# Patient Record
Sex: MALE | Race: WHITE | NOT HISPANIC OR LATINO | Employment: OTHER | URBAN - METROPOLITAN AREA
[De-identification: names, ages, dates, MRNs, and addresses within clinical notes are randomized per-mention and may not be internally consistent; named-entity substitution may affect disease eponyms.]

---

## 2017-01-04 ENCOUNTER — ALLSCRIPTS OFFICE VISIT (OUTPATIENT)
Dept: OTHER | Facility: OTHER | Age: 72
End: 2017-01-04

## 2017-01-10 ENCOUNTER — HOSPITAL ENCOUNTER (OUTPATIENT)
Dept: RADIOLOGY | Facility: MEDICAL CENTER | Age: 72
Discharge: HOME | End: 2017-01-10
Payer: MEDICARE

## 2017-01-10 DIAGNOSIS — I70.299 OTHER ATHEROSCLEROSIS OF NATIVE ARTERIES OF EXTREMITIES, UNSPECIFIED EXTREMITY (HCC): ICD-10-CM

## 2017-01-10 DIAGNOSIS — I70.0 ATHEROSCLEROSIS OF AORTA (HCC): ICD-10-CM

## 2017-01-10 PROCEDURE — 93925 LOWER EXTREMITY STUDY: CPT

## 2017-01-10 PROCEDURE — 93923 UPR/LXTR ART STDY 3+ LVLS: CPT

## 2017-01-11 ENCOUNTER — ALLSCRIPTS OFFICE VISIT (OUTPATIENT)
Dept: OTHER | Facility: OTHER | Age: 72
End: 2017-01-11

## 2017-04-11 DIAGNOSIS — I70.209 ATHEROSCLEROSIS OF NATIVE ARTERY OF EXTREMITY (HCC): ICD-10-CM

## 2017-04-13 ENCOUNTER — HOSPITAL ENCOUNTER (OUTPATIENT)
Dept: RADIOLOGY | Facility: MEDICAL CENTER | Age: 72
Discharge: HOME | End: 2017-04-13
Payer: MEDICARE

## 2017-04-13 DIAGNOSIS — I70.209 ATHEROSCLEROSIS OF NATIVE ARTERY OF EXTREMITY (HCC): ICD-10-CM

## 2017-04-13 PROCEDURE — 93923 UPR/LXTR ART STDY 3+ LVLS: CPT

## 2017-04-13 PROCEDURE — 93925 LOWER EXTREMITY STUDY: CPT

## 2017-06-08 ENCOUNTER — ALLSCRIPTS OFFICE VISIT (OUTPATIENT)
Dept: OTHER | Facility: OTHER | Age: 72
End: 2017-06-08

## 2017-07-11 DIAGNOSIS — I70.209 ATHEROSCLEROSIS OF NATIVE ARTERY OF EXTREMITY (HCC): ICD-10-CM

## 2017-07-11 DIAGNOSIS — I10 ESSENTIAL (PRIMARY) HYPERTENSION: ICD-10-CM

## 2017-07-14 ENCOUNTER — HOSPITAL ENCOUNTER (OUTPATIENT)
Dept: RADIOLOGY | Facility: MEDICAL CENTER | Age: 72
Discharge: HOME | End: 2017-07-14
Payer: MEDICARE

## 2017-07-14 ENCOUNTER — GENERIC CONVERSION - ENCOUNTER (OUTPATIENT)
Dept: OTHER | Facility: OTHER | Age: 72
End: 2017-07-14

## 2017-07-14 DIAGNOSIS — I70.209 ATHEROSCLEROSIS OF NATIVE ARTERY OF EXTREMITY (HCC): ICD-10-CM

## 2017-07-14 PROCEDURE — 93925 LOWER EXTREMITY STUDY: CPT

## 2017-07-14 PROCEDURE — 93923 UPR/LXTR ART STDY 3+ LVLS: CPT

## 2017-07-17 ENCOUNTER — ALLSCRIPTS OFFICE VISIT (OUTPATIENT)
Dept: OTHER | Facility: OTHER | Age: 72
End: 2017-07-17

## 2017-08-10 ENCOUNTER — HOSPITAL ENCOUNTER (OUTPATIENT)
Dept: RADIOLOGY | Facility: MEDICAL CENTER | Age: 72
Discharge: HOME | End: 2017-08-10
Payer: MEDICARE

## 2017-08-10 ENCOUNTER — HOSPITAL ENCOUNTER (OUTPATIENT)
Dept: NON INVASIVE DIAGNOSTICS | Facility: MEDICAL CENTER | Age: 72
Discharge: HOME | End: 2017-08-10
Payer: MEDICARE

## 2017-08-10 DIAGNOSIS — I10 ESSENTIAL (PRIMARY) HYPERTENSION: ICD-10-CM

## 2017-08-10 PROCEDURE — 93880 EXTRACRANIAL BILAT STUDY: CPT

## 2017-08-10 PROCEDURE — 93306 TTE W/DOPPLER COMPLETE: CPT

## 2017-08-17 DIAGNOSIS — I65.29 OCCLUSION AND STENOSIS OF UNSPECIFIED CAROTID ARTERY: ICD-10-CM

## 2017-08-21 ENCOUNTER — APPOINTMENT (OUTPATIENT)
Dept: LAB | Facility: MEDICAL CENTER | Age: 72
End: 2017-08-21
Payer: MEDICARE

## 2017-08-21 DIAGNOSIS — I65.29 OCCLUSION AND STENOSIS OF UNSPECIFIED CAROTID ARTERY: ICD-10-CM

## 2017-08-21 LAB
BUN SERPL-MCNC: 17 MG/DL (ref 5–25)
CREAT SERPL-MCNC: 1.15 MG/DL (ref 0.6–1.3)
GFR SERPL CREATININE-BSD FRML MDRD: 62 ML/MIN/1.73SQ M

## 2017-08-21 PROCEDURE — 36415 COLL VENOUS BLD VENIPUNCTURE: CPT

## 2017-08-21 PROCEDURE — 82565 ASSAY OF CREATININE: CPT

## 2017-08-21 PROCEDURE — 84520 ASSAY OF UREA NITROGEN: CPT

## 2017-08-26 ENCOUNTER — HOSPITAL ENCOUNTER (OUTPATIENT)
Dept: CT IMAGING | Facility: HOSPITAL | Age: 72
Discharge: HOME | End: 2017-08-26
Payer: MEDICARE

## 2017-08-26 DIAGNOSIS — I65.29 OCCLUSION AND STENOSIS OF UNSPECIFIED CAROTID ARTERY: ICD-10-CM

## 2017-08-26 PROCEDURE — 70498 CT ANGIOGRAPHY NECK: CPT

## 2017-08-26 RX ADMIN — IOHEXOL 85 ML: 350 INJECTION, SOLUTION INTRAVENOUS at 09:42

## 2017-09-12 ENCOUNTER — ALLSCRIPTS OFFICE VISIT (OUTPATIENT)
Dept: OTHER | Facility: OTHER | Age: 72
End: 2017-09-12

## 2017-10-26 NOTE — PROGRESS NOTES
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)  3  Carotid artery stenosis (433 10) (I65 29)  4  Current every day smoker (305 1) (F17 200)  5  Hyperlipidemia (272 4) (E78 5)1      1 Amended By: Huy Champion; Sep 12 2017 5:03 PM EST    Plan  Aortic stenosis, CAD (coronary artery disease), SocHx: Current every day smoker    · VAS CAROTID COMPLETE STUDY; SIDE:Bilateral; Status:Active; Requested  for:[id number]; Perform:[facility]; IGD:[id number]; Last Updated By:Rene Blanca;   9/12/2017 3:52:07 PM;Ordered; For:Aortic stenosis, CAD (coronary artery disease),   SocHx: Current every day smoker; Ordered By:Eagle Higuera;  Carotid artery stenosis    · Renew: Atorvastatin Calcium 80 MG Oral Tablet; TAKE 1 TABLET AT BEDTIME  Rx By: Huy Champion; Dispense: 90 Days ; #:90 Tablet; Refill: 2;For: Carotid artery   stenosis; TONY = N; Verified Transmission to [facility]; Last   Updated By: System, [employer]; 9/12/2017 1:42:54 PM  SocHx: Current every day smoker    · Call 911 if: You have any symptoms of a stroke ; Status:Complete;   Done: [id number]  Ordered; For:SocHx: Current every day smoker; Ordered By:Eagle Higuera;   · VAS LOWER LIMB ARTERIAL DUPLEX, COMPLETE BILATERAL/GRAFTS; SIDE:Bilateral;  Status:Active; Requested for:18Qdj0233; Perform:[facility]; OZM:[id number]; Last Updated By:Rene Blanca;   9/12/2017 3:52:07 PM;Ordered; For:SocHx: Current every day smoker; Ordered By:Eagle Higuera;    Discussion/Summary  Discussion Summary:   #1Asymptomatic 1  high-grade bilateral carotid artery stenosis? I reviewed the CT scan from 2014 and the one done recently, there is minimal change between the 2 CAT scans  However there is a progression of the velocities on the carotid Dopplers  I will discuss with this case with Dr Lyndsey Smith as well since he has known him for a long time  I went over with him the risks and benefits of treatment options which include best medical management, carotid endarterectomy versus carotid stent  Risks of the procedure the chest stroke were discussed with the patient  I also recommended that we should increase the dose of statin to 80 mg per day which she agrees to do  I also recommended Plavix but the patient does not want to take it at this point he will continue with his aspirin  The symptoms of stroke were explained to patient if that happens he should go to the nearest emergency room with 911  #2Atherosclerosis 1  of the bypass graft of the left lower extremity?there is stenosis of the distal anastomosis the bypass graft  It has not changed over the last 6 months with a preserved SAMEER and no symptoms of a continue to watch it with repeat duplex in 6 months  hyperlipidemia?increase statin dose to 80 mg a day  6 at smoking with the continuous disorder?smoking cessation strictly enforced, counseling done today  1        1 Amended By: Rafi Tesfaye; Sep 12 2017 5:02 PM EST    Chief Complaint  Chief Complaint Free Text Note Form:  I am here for my test results  History of Present Illness  HPI: Pt is here to review CTA of neck 8/26/2017  He denies slurred speech, facial weakness  Denies hx of stroke  He offers no complaints  Review of Systems  Complete Male - Vasc:   Constitutional: No fever or chills, feels well, no tiredness, no recent weight gain or weight loss  Eyes: No sudden vision loss, no blurred vision, no double vision  ENT: no loss of hearing, no nosebleeds, no hoarseness  Cardiovascular: intermittent leg claudication,-- no painful veins,-- no leg pain with walking-- and-- no bleeding veins, but-- regular heart rate,-- no chest pain-- and-- no palpitations  Respiratory: no wheezing,-- no cough,-- no shortness of breath during exertion,-- no orthopnea-- and-- no complaints of PND, but-- shortness of breath     Gastrointestinal: diarrhea, but-- no nausea,-- no vomiting,-- no constipation-- and-- no blood in stools  no hemorrhoids   Genitourinary: no dysuria, no hematuria, No urinary incontinence, no erectile dysfunction  Musculoskeletal: no limb pain, no limb swelling  Integumentary: dry skin-- and-- no ulcers, but-- no rashes,-- no itching,-- no skin lesions-- and-- no skin wound  Neurological: headache,-- numbness-- and-- no dementia, but-- no confusion,-- no dizziness,-- no limb weakness,-- no convulsions,-- no fainting-- and-- no difficulty walking  Psychiatric: no depression, no mood disorders, no anxiety  Hematologic/Lymphatic: no bleeding disorder, no easy bruising  ROS Reviewed:   ROS reviewed  Active Problems  1  Aortic stenosis (424 1) (I35 0)  2  Aorto-iliac atherosclerosis (440 0,440 20) (I70 0,I70 299)  3  Atherosclerosis of arteries of extremities (440 20) (I70 209)  4  History of Atherosclerosis of left lower extremity with rest pain (440 22) (I70 222)  5  Atherosclerosis of lower extremity with claudication (440 21) (I70 219)  6  Bilateral lower extremity edema (782 3) (R60 0)  7  Bulging lumbar disc (722 10) (M51 26)  8  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)  9  CAD (coronary artery disease) (414 00) (I25 10)  10  Carotid artery stenosis (433 10) (I65 29)  11  Chest pain (786 50) (R07 9)  12  Current every day smoker (305 1) (F17 200)  13  DMII (diabetes mellitus, type 2) (250 00) (E11 9)  14  Edema of left lower extremity (782 3) (R60 0)  15  Headache (784 0) (R51)  16  Hyperlipidemia (272 4) (E78 5)  17  Hypertension (401 9) (I10)  18  Left leg swelling (729 81) (M79 89)  19  Low back pain radiating to right leg (724 2) (M54 5)  20  Peripheral arterial disease (443 9) (I73 9)  21  Postoperative state (V45 89) (Z98 890)  22  Renal artery stenosis (440 1) (I70 1)  23  Right lumbar radiculopathy (724 4) (M54 16)  24  Shingles rash (053 9) (B02 9)    Past Medical History  1   History of Acute alcoholic pancreatitis (449 9) (K85 20)  2  History of Atherosclerosis of left lower extremity with rest pain (440 22) (I70 222)  3  History of Cervical spinal stenosis (723 0) (M48 02)  4  History of Dyslipidemia (272 4) (E78 5)  5  History of gastroesophageal reflux (GERD) (V12 79) (Z87 19)  Active Problems And Past Medical History Reviewed: The active problems and past medical history were reviewed and updated today  Surgical History  1  History of Appendectomy For Ruptured Appendix  2  History of Bypass Graft Using Vein: Femoral-tibial  3  CABG  4  History of Common Femoral Thromboendarterectomy  5  History of Right Hemicolectomy  6  History of Tonsillectomy With Adenoidectomy  Surgical History Reviewed: The surgical history was reviewed and updated today  Family History  Father   1  Family history of Heart Disease (V17 49)  2  Family history of Prior Myocardial Infarction  Sister   3  Family history of CABG  Uncle   4  Family history of cerebrovascular accident (V17 1) (Z82 3)  Family History   5  Family history of Heart Disease (V17 49)  6  Family history of Prior Myocardial Infarction  Family History Reviewed: The family history was reviewed and updated today  Social History   · Active smoker (305 1) (Z72 0)   · Alcohol Use (History)   · Denied: History of Being A Social Drinker   · Caffeine Use   · Current every day smoker (305 1) (F17 200)   · Denied: History of Drug Use   · Denied: History of Former Smoker   · Job Hazardous   · Retired From Work  Social History Reviewed: The social history was reviewed and updated today  Current Meds  1  AmLODIPine Besylate 10 MG Oral Tablet; TAKE 1 TABLET DAILY  Requested for:   27LNO0126; Last Rx:45Vzm9250 Ordered  2  Aspirin 81 MG TABS; TAKE 1 TABLET DAILY; Therapy: 49Uyx9096 to (Evaluate:35Nmo4541); Last Rx:56Pdg4402 Ordered  3  Atorvastatin Calcium 40 MG Oral Tablet Recorded  4  Benazepril HCl - 40 MG Oral Tablet; Therapy: (Katarzyna Frye) to Recorded  5  Coenzyme Q10 200 MG Oral Capsule; Take as directed Recorded  6  Daily Multiple Vitamins TABS; Take as directed Recorded  7  Magnesium CAPS; Take 1 cap every other day; Therapy: (Ector Oshea) to Recorded  8  MetFORMIN HCl - 500 MG Oral Tablet; TAKE 1 TABLET TWICE DAILY,  WITH MORNING   AND EVENING MEAL; Therapy: (Recorded:15Oct2015) to Recorded  9  Metoprolol Tartrate 25 MG Oral Tablet; Take 1 tablet twice daily; Therapy: 61Zqj1735 to (Evaluate:22Sep2014)  Requested for: 92FIZ3466; Last   Rx:27Sep2013 Ordered  10  Omeprazole 40 MG Oral Capsule Delayed Release; TAKE 1 CAPSULE DAILY; Therapy: (Elsy Sigala) to Recorded  11  OneTouch Delica Lancets MISC; Use once a day; Therapy: 41JGF4875 to ([phone])  Requested for: 29Oct2013; Last    ML:74YAN7248 Ordered  12  OneTouch Ultra Blue In Vitro Strip; TEST ONCE DAILY; Therapy: 73YTI4486 to ([phone])  Requested for: 29Oct2013; Last    Rx:29Oct2013 Ordered  13  OneTouch Ultra Mini w/Device Kit; USE AS DIRECTED; Therapy: 48ASP9589 to (Last JE:78WMC8589)  Requested for: 29Oct2013 Ordered  14  SM Vitamin B-12 TABS; Therapy: (Recorded:27Sep2013) to Recorded  Medication List Reviewed: The medication list was reviewed and updated today  Allergies  1  No Known Drug Allergies    Vitals  Vital Signs    Recorded: 12Sep2017 12:54PM   Heart Rate 56, R Radial   Pulse Quality Normal, R Radial   Respiration 14   Systolic 087, RUE, Sitting   Diastolic 60, RUE, Sitting   Height 5 ft 11 in   Weight 188 lb    BMI Calculated 26 22   BSA Calculated 2 05     Physical Exam    Posterior tibialis: right 0-- and-- left 0  Dorsalis pedis: right 0-- and-- left 0  Distal Pulse Exam: This patient had dopplerable pulses in these locations: right ,-- left Aphasic anterior tibial    There was a palpable graft pulse at Left femoral to anterior tibial bypass  Normal Capillary Refill  Extremities: bilateral ankle pitting edema     LE Varicose Veins: no right leg truncal veins  The heart rate was normal  The rhythm was regular  Heart sounds: normal S1-- and-- normal S2    Murmurs: systolic ejection murmur   Pulmonary   Respiratory effort: No increased work of breathing or signs of respiratory distress  Auscultation of lungs: Clear to auscultation  No wheezing, no rales, no rhonchi  Psychiatric   Orientation to person, place and time: Normal    Mood and affect: Normal    Eyes   Conjunctiva and lids: No swelling, erythema, or discharge  Pupils and irises: Equal, round and reactive to light  Ears, Nose, Mouth, and Throat   Hearing: Normal    Neck   Neck: No jugular venous distention, normal ROM and extension  No cervical adenopathy, trachea midline, neck supple  Musculoskeletal   Gait and station: Normal    Skin   Skin and subcutaneous tissue: Normal without rashes or lesions  Results/Data  Diagnostic Studies Reviewed Vasc: I personally reviewed the films/images/results in the office today  My interpretation follows  Vascular Study Review Dear duplex of the lower extremity reviewed, there is persistent stenosis of the distal anastomosis of the femoral to anterior tibial artery bypass  This is unchanged from last time  The ABIs preserved  CT Scan Review CT angiogram of the head and neck reviewed, there is bilateral high-grade carotid stenosis with calcified irregular plaque  Future Appointments    Date/Time Provider Specialty Site   11/01/2017 01:40 PM PATY Garcia  Cardiology [facility] CARDIOLOGY [place]     Signatures   Electronically signed by : Froylan Fitzgerald MD; Sep 12 2017  5:01PM EST                       (Author)    Electronically signed by :  Froylan Fitzgerald MD; Sep 12 2017  5:03PM EST                       (Author)

## 2017-11-27 ENCOUNTER — ALLSCRIPTS OFFICE VISIT (OUTPATIENT)
Dept: OTHER | Facility: OTHER | Age: 72
End: 2017-11-27

## 2018-01-09 NOTE — MISCELLANEOUS
Message  pt had called 3/10/16 w/ concerns re: doppler results from 2/23/16, he did not receive doppler letter  his LLE/foot cont to be swollen no signif improvement s/p L fem tib bypass 11/19/15  He notes his r foot also has some swelling  He notes his R great toe also has some pain in it the first thing in the am when he gets up but this resolves once he is active  No discoloration  He does have a hx of gout  he req apt w/ Dr Afshan Woods which was sched for 3/21/16  Dr Afshan Woods was emailed and notified of same on 3/16  per Dr Afshan Woods "His swelling is understandable following his surgery his multiple medical problem history and his medications (Amlodipine)  His Doppler fell thru the cracks in our Follow up workflow so a letter was not generated  Re-assure him that his study looks great and his bypass is working perfectly " pt was notified of same, he will keep f/u apt as sched for 3/21/16 to address his concerns re: cont swelling  Active Problems    1  Aortic stenosis (747 22) (Q25 3)   2  Aorto-iliac atherosclerosis (440 0,440 20) (I70 0,I70 299)   3  Atheroscler native arteries the extremities w/intermit claudication (440 21) (I70 219)   4  Atherosclerosis of arteries of extremities (440 20) (I70 209)   5  Atherosclerosis of left lower extremity with rest pain (440 22) (I70 222)   6  Atherosclerotic heart disease of native coronary artery without angina pectoris (414 01)   (I25 10)   7  Bilateral lower extremity edema (782 3) (R60 0)   8  Carotid artery stenosis (433 10) (I65 29)   9  Chest pain (786 50) (R07 9)   10  DMII (diabetes mellitus, type 2) (250 00) (E11 9)   11  Edema of left lower extremity (782 3) (R60 0)   12  Headache (784 0) (R51)   13  Hyperlipidemia (272 4) (E78 5)   14  Hypertension (401 9) (I10)   15  Left leg swelling (729 81) (M79 89)   16  Peripheral arterial disease (443 9) (I73 9)   17  Postoperative state (V45 89) (Z98 89)   18  Renal artery stenosis (440 1) (I70 1)   19   Shingles rash (053  9) (B02 9)    Current Meds   1  AmLODIPine Besylate 5 MG Oral Tablet; TAKE 1 TABLET DAILY; Last Rx:09Apr2015   Ordered   2  Aspirin 81 MG Oral Tablet; TAKE 1 TABLET DAILY; Therapy: 75Hki0307 to (Evaluate:18Jun2016); Last Rx:76Cdy5086 Ordered   3  Atorvastatin Calcium 40 MG Oral Tablet Recorded   4  Benazepril HCl - 40 MG Oral Tablet; Therapy: (Heydi Manrique) to Recorded   5  Coenzyme Q10 200 MG Oral Capsule; Take as directed Recorded   6  Daily Multiple Vitamins TABS; Take as directed Recorded   7  Magnesium CAPS; Take 1 cap every other day; Therapy: (Matthew Delgado) to Recorded   8  MetFORMIN HCl - 500 MG Oral Tablet; TAKE 1 TABLET TWICE DAILY,  WITH MORNING   AND EVENING MEAL; Therapy: (Recorded:15Oct2015) to Recorded   9  MetFORMIN HCl - 500 MG Oral Tablet; Take 1 tablet twice daily; Therapy: 83CJE0687 to Recorded   10  Metoprolol Tartrate 25 MG Oral Tablet; Take 1 tablet twice daily; Therapy: 68Ydg2360 to (Evaluate:22Sep2014)  Requested for: 21UPF7149; Last    Rx:25Wpg5766 Ordered   11  Omeprazole 40 MG Oral Capsule Delayed Release; TAKE 1 CAPSULE DAILY; Therapy: (Yaritza Bright) to Recorded   12  OneTouch Delica Lancets MISC; Use once a day; Therapy: 50MAL1366 to ([phone])  Requested for: 29Oct2013; Last    Rx:29Oct2013 Ordered   13  OneTouch Ultra Blue In Vitro Strip; TEST ONCE DAILY; Therapy: 35XTD9944 to ([phone])  Requested for: 29Oct2013; Last    OM:01YJX9231 Ordered   14  OneTouch Ultra Mini w/Device Kit; USE AS DIRECTED; Therapy: 51DOC8688 to (Last ZZ:72ASZ2416)  Requested for: 29Oct2013 Ordered   15  SM Vitamin B-12 TABS; Therapy: (Recorded:16Lsn6181) to Recorded    Allergies    1   No Known Drug Allergies    Signatures   Electronically signed by : Benjamín Simmons, ; Mar 15 2016 10:35AM EST                       (Author)

## 2018-01-11 NOTE — CONSULTS
I had the pleasure of evaluating your patient, Marie Rubio  My full evaluation follows:      Chief Complaint  Patient is here today for 3 mo f/u  Patient c/o occ Dizziness x 1 5 weeks ago  Patient is here today for follow up of chronic conditions described in HPI  History of Present Illness  Mr Tania Chen is a 74year old man with coronary artery disease s/p CABG and LLE revascularization who returns for follow up  No chest pain, shortness of breath, or palpitations  Occasionally feels dizzy when he stands up  Was prescribed plavix, but hasn't started  Review of Systems      Cardiac: No complaints of chest pain, no palpitations, no fainiting  Skin: No complaints of nonhealing sores or skin rash  Genitourinary: No complaints of recurrent urinary tract infections, frequent urination at night, difficult urination, blood in urine, kidney stones, loss of bladder control, no kidney or prostate problems, no erectile dysfunction  Psychological: No complaints of feeling depressed, anxiety, panic attacks, or difficulty concentrating  General: No complaints of trouble sleeping, lack of energy, fatigue, appetite changes, weight changes, fever, frequent infections, or night sweats  Respiratory: No complaints of shortness of breath, cough with sputum, or wheezing  HEENT: No complaints of serious problems, hearing problems, nose problems, throat problems, or snoring  Gastrointestinal: No complaints of liver problems, nausea, vomiting, heartburn, constipation, bloody stools, diarrhea, problems swallowing, adbominal pain, or rectal bleeding  Hematologic: No complaints of bleeding disorders, anemia, blood clots, or excessive brusing  Neurological: dizziness   Musculoskeletal: No complaints of arthritis, back pain, or painfull swelling  Active Problems    1  Aortic stenosis (424 1) (I35 0)   2  Aorto-iliac atherosclerosis (440 0,440 20) (I70 0,I70 299)   3   Atherosclerosis of arteries of extremities (440 20) (I70 209)   4  History of Atherosclerosis of left lower extremity with rest pain (440 22) (I70 222)   5  Atherosclerosis of lower extremity with claudication (440 21) (I70 219)   6  Bilateral lower extremity edema (782 3) (R60 0)   7  Bulging lumbar disc (722 10) (M51 26)   8  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)   9  CAD (coronary artery disease) (414 00) (I25 10)   10  Carotid artery stenosis (433 10) (I65 29)   11  Chest pain (786 50) (R07 9)   12  Current every day smoker (305 1) (F17 200)   13  DMII (diabetes mellitus, type 2) (250 00) (E11 9)   14  Edema of left lower extremity (782 3) (R60 0)   15  Headache (784 0) (R51)   16  Hyperlipidemia (272 4) (E78 5)   17  Hypertension (401 9) (I10)   18  Left leg swelling (729 81) (M79 89)   19  Low back pain radiating to right leg (724 2) (M54 5)   20  Peripheral arterial disease (443 9) (I73 9)   21  Postoperative state (V45 89) (Z98 890)   22  Renal artery stenosis (440 1) (I70 1)   23  Right lumbar radiculopathy (724 4) (M54 16)   24  Shingles rash (053 9) (B02 9)    Past Medical History    · History of Acute alcoholic pancreatitis (509 5) (K85 20)   · History of Atherosclerosis of left lower extremity with rest pain (440 22) (I70 222)   · History of Cervical spinal stenosis (723 0) (M48 02)   · History of Dyslipidemia (272 4) (E78 5)   · History of gastroesophageal reflux (GERD) (V12 79) (Z87 19)    The active problems and past medical history were reviewed and updated today  Surgical History    · History of Appendectomy For Ruptured Appendix   · History of Bypass Graft Using Vein: Femoral-tibial   · CABG   · History of Common Femoral Thromboendarterectomy   · History of Right Hemicolectomy   · History of Tonsillectomy With Adenoidectomy    The surgical history was reviewed and updated today         Family History    · Family history of Heart Disease (V17 49)   · Family history of Prior Myocardial Infarction    · Family history of CABG    · Family history of cerebrovascular accident (V17 1) (Z82 3)    · Family history of Heart Disease (V17 49)   · Family history of Prior Myocardial Infarction    The family history was reviewed and updated today  Social History    · Active smoker (305 1) (Z72 0)   · Alcohol Use (History)   · Denied: History of Being A Social Drinker   · Caffeine Use   · Current every day smoker (305 1) (F17 200)   · Denied: History of Drug Use   · Denied: History of Former Smoker   · Job Hazardous   · Retired From Work  The social history was reviewed and updated today  The social history was reviewed and is unchanged  Current Meds   1  AmLODIPine Besylate 10 MG Oral Tablet; TAKE 1 TABLET DAILY  Requested for:   64XQV1505; Last Rx:55Awb3048 Ordered   2  Aspirin 81 MG TABS; TAKE 1 TABLET DAILY; Therapy: 45Lyp7483 to (Evaluate:18Jun2016); Last Rx:89Wgq2046 Ordered   3  Atorvastatin Calcium 80 MG Oral Tablet; TAKE 1 TABLET AT BEDTIME  Requested for:   48Vqk2184; Last Rx:21Mrh6845 Ordered   4  Benazepril HCl - 40 MG Oral Tablet; Therapy: (Jaclyn Kim) to Recorded   5  Coenzyme Q10 200 MG Oral Capsule; Take as directed Recorded   6  Daily Multiple Vitamins TABS; Take as directed Recorded   7  Magnesium CAPS; Take 1 cap every other day; Therapy: [phone]) to Recorded   8  MetFORMIN HCl - 500 MG Oral Tablet; TAKE 1 TABLET TWICE DAILY,  WITH MORNING   AND EVENING MEAL; Therapy: (Recorded:15Oct2015) to Recorded   9  Metoprolol Tartrate 25 MG Oral Tablet; Take 1 tablet twice daily; Therapy: 84Jud0257 to (Evaluate:46Bsm3013)  Requested for: 82QNG7364; Last   Rx:94Mdj4724 Ordered   10  Omeprazole 40 MG Oral Capsule Delayed Release; TAKE 1 CAPSULE DAILY; Therapy: (Sussy Grant) to Recorded   11  OneTouch Delica Lancets MISC; Use once a day; Therapy: 59RMX3370 to (06 [phone])  Requested for: 29Oct2013; Last    PH:05DVG8235 Ordered   12   OneTouch Ultra Blue In [employer]; TEST ONCE DAILY; Therapy: 36VQT8187 to (21 )  Requested for: 29Oct2013; Last    Rx:29Oct2013 Ordered   13  OneTouch Ultra Mini w/Device Kit; USE AS DIRECTED; Therapy: 86UNH3019 to (Last IS:84RTR7677)  Requested for: 29Oct2013 Ordered   14  SM Vitamin B-12 TABS; Therapy: (Recorded:27Sep2013) to Recorded    The medication list was reviewed and updated today  Allergies    1  No Known Drug Allergies    Vitals   Recorded: 27Nov2017 11:29AM   Heart Rate 58, L Radial   Systolic 396, LUE, Sitting   Diastolic 60, LUE, Sitting   Height 5 ft 11 in   Weight 193 lb 4 oz   BMI Calculated 26 95   BSA Calculated 2 08     Physical Exam    Constitutional   General appearance: No acute distress, well appearing and well nourished  Eyes   Conjunctiva and Sclera examination: Conjunctiva pink, sclera anicteric  Ears, Nose, Mouth, and Throat - External inspection of ears and nose: Normal without deformities or discharge  Neck   Neck and thyroid: Normal, supple, trachea midline, no thyromegaly  Pulmonary   Respiratory effort: No increased work of breathing or signs of respiratory distress  Cardiovascular   Auscultation of heart: Abnormal   A grade 2 systolic murmur was heard at the RUSB  Carotid pulses: Abnormal   right carotid bruit heard  Examination of extremities for edema and/or varicosities: Abnormal   left pretibial 1+ pretibial pitting edema  LLE erythematous  Chest - Chest: Normal    Abdomen   Abdomen: Non-tender and no distention  Musculoskeletal Gait and station: Normal gait  Skin - Skin and subcutaneous tissue: Normal without rashes or lesions  Skin is warm and well perfused, normal turgor  Neurologic - Speech: Normal     Psychiatric - Orientation to person, place, and time: Normal  Mood and affect: Normal       Assessment    1  CAD (coronary artery disease) (414 00) (I25 10)   2  Carotid artery stenosis (433 10) (I65 29)   3   Atherosclerosis of arteries of extremities (440 20) (I70 209)   4  Aortic stenosis (424 1) (I35 0)   5  Aorto-iliac atherosclerosis (440 0,440 20) (I70 0,I70 299)   6  Hyperlipidemia (272 4) (E78 5)   7  Hypertension (401 9) (I10)    Plan  Hypertension    · Clopidogrel Bisulfate 75 MG Oral Tablet; Take 1 tablet daily   Rx By: Ten Garcia; Dispense: 90 Days ; #:90 Tablet; Refill: 3; For: Hypertension; TONY = N; Sent To: [facility]   · Follow-up visit in 6 months Evaluation and Treatment  Follow-up  Status: Hold For -  Scheduling  Requested for: 92FIF6204   Ordered; For: Hypertension; Ordered By: Ten Garcia Performed:  Due: 94WDI5686    Discussion/Summary    74year old man with coronary artery disease s/p CABG and LLE revascularization who returns for follow up  No chest pain, shortness of breath, or palpitations  Occasionally feels dizzy when he stands up  Was prescribed plavix, but hasn't started  Impression:  1  Coronary artery disease s/p CABG - doing well  2  Hypertension - good control  3  Severe bilateral carotid disease  4  Dyslipidemia - doing well  5  Peripheral arterial disease - s/p LLE revascularization  Doing well  6  Aortic stenosis - moderate by echo 10/17  Recommendations:  1  Start Clopidogrel 75mg daily  2  Continue remainder of medications  3  Patient to follow up with Vascular Surgery to evaluate for carotid artery disease  4  Fasting lipid panel checked by PCP  5  Follow up in 6 months  Thank you very much for allowing me to participate in the care of this patient  If you have any questions, please do not hesitate to contact me        Signatures   Electronically signed by : PATY Eason ; Nov 27 2017 11:46AM EST                       (Author)

## 2018-01-12 VITALS
RESPIRATION RATE: 14 BRPM | HEART RATE: 66 BPM | WEIGHT: 190 LBS | SYSTOLIC BLOOD PRESSURE: 150 MMHG | BODY MASS INDEX: 26.6 KG/M2 | HEIGHT: 71 IN | DIASTOLIC BLOOD PRESSURE: 80 MMHG

## 2018-01-12 NOTE — MISCELLANEOUS
Message  Called patient for review after reviewing results of his recent FREDO  FREDO with new finding of multiple areas of high grade graft stenosis  Patient continues to experience LLE edema  He states it is improving very slowly  Swelling is more confined to the ankle and foot, as opposed to throughout the calf as it had been for many months after surgery last Nov 2015  He says that since surgery his left foot has been numb  He had ulcers to his left foot and calf claudication prior to his bypass last year  Currently he denies any claudication symptoms  He reports he is able to work out in his yard with no difficulty  I informed him that his edema and continued numbness are not signs of new graft stenosis  Since he is asymptomatic and ABIs are near normal we can repeat FREDO in 6 months  He is anxious about the graft completely thrombosing  I will discuss this case with Dr Leah Gloria and contact patient to let him know if prophylactic arteriogram is indicated due to his new graft stenosis        Signatures   Electronically signed by : Whit Lyon; Jul 8 2016  3:38PM EST                       (Author)

## 2018-01-13 VITALS
WEIGHT: 12 LBS | BODY MASS INDEX: 1.68 KG/M2 | SYSTOLIC BLOOD PRESSURE: 170 MMHG | HEIGHT: 71 IN | HEART RATE: 56 BPM | DIASTOLIC BLOOD PRESSURE: 60 MMHG

## 2018-01-13 VITALS
HEART RATE: 57 BPM | HEIGHT: 71 IN | SYSTOLIC BLOOD PRESSURE: 148 MMHG | DIASTOLIC BLOOD PRESSURE: 64 MMHG | WEIGHT: 191 LBS | BODY MASS INDEX: 26.74 KG/M2

## 2018-01-13 VITALS
WEIGHT: 188 LBS | DIASTOLIC BLOOD PRESSURE: 60 MMHG | BODY MASS INDEX: 26.32 KG/M2 | SYSTOLIC BLOOD PRESSURE: 138 MMHG | HEIGHT: 71 IN | HEART RATE: 56 BPM | RESPIRATION RATE: 14 BRPM

## 2018-01-13 VITALS
BODY MASS INDEX: 27.06 KG/M2 | DIASTOLIC BLOOD PRESSURE: 60 MMHG | HEART RATE: 58 BPM | SYSTOLIC BLOOD PRESSURE: 120 MMHG | HEIGHT: 71 IN | WEIGHT: 193.25 LBS

## 2018-01-13 NOTE — MISCELLANEOUS
Message  Spoke with patient today about treatment of LLE bypass graft stenosis  We discussed the risk vs benefits of arteriogram  He would like to proceed with arteriogram with Dr Sylvia Holliday only  I explained to him that angioplasty of his graft stenosis would have no impact on his LLE edema  Plan  Atherosclerosis of arteries of extremities, Bypass graft stenosis    · (1) PT WITH INR; Status:Active; Requested for:63Xiv5968;   Bypass graft stenosis    · (1) BASIC METABOLIC PROFILE; Status:Active; Requested for:21Jul2016;    · (1) CBC/ PLT (NO DIFF); Status:Active;  Requested for:21Jul2016;    · Schedule Surgery Treatment  Procedure:  [id number] left lower extremity possible  angioplasty of bypass graft stenosis ([facility] only)  Status: Hold For - Scheduling   Requested for: 33Njb0934    Signatures   Electronically signed by : Christiano Grant; Jul 21 2016  4:52PM EST                       (Author)

## 2018-01-14 VITALS
HEIGHT: 71 IN | RESPIRATION RATE: 16 BRPM | SYSTOLIC BLOOD PRESSURE: 130 MMHG | HEART RATE: 60 BPM | DIASTOLIC BLOOD PRESSURE: 62 MMHG | BODY MASS INDEX: 27.61 KG/M2 | WEIGHT: 197.25 LBS

## 2018-01-15 DIAGNOSIS — I35.0 NONRHEUMATIC AORTIC VALVE STENOSIS: ICD-10-CM

## 2018-01-15 DIAGNOSIS — F17.200 NICOTINE DEPENDENCE, UNCOMPLICATED: ICD-10-CM

## 2018-01-15 DIAGNOSIS — I25.10 ATHEROSCLEROTIC HEART DISEASE OF NATIVE CORONARY ARTERY WITHOUT ANGINA PECTORIS: ICD-10-CM

## 2018-01-15 DIAGNOSIS — T82.858A STENOSIS OF OTHER VASCULAR PROSTHETIC DEVICES, IMPLANTS AND GRAFTS, INITIAL ENCOUNTER (HCC): ICD-10-CM

## 2018-01-15 DIAGNOSIS — I70.209 ATHEROSCLEROSIS OF NATIVE ARTERY OF EXTREMITY (HCC): ICD-10-CM

## 2018-01-15 NOTE — MISCELLANEOUS
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)   9  CAD (coronary artery disease) (414 00) (I25 10)   10  Carotid artery stenosis (433 10) (I65 29)   11  Chest pain (786 50) (R07 9)   12  DMII (diabetes mellitus, type 2) (250 00) (E11 9)   13  Edema of left lower extremity (782 3) (R60 0)   14  Headache (784 0) (R51)   15  Hyperlipidemia (272 4) (E78 5)   16  Hypertension (401 9) (I10)   17  Left leg swelling (729 81) (M79 89)   18  Low back pain radiating to right leg (724 2) (M54 5)   19  Peripheral arterial disease (443 9) (I73 9)   20  Postoperative state (V45 89) (Z98 89)   21  Renal artery stenosis (440 1) (I70 1)   22  Right lumbar radiculopathy (724 4) (M54 16)   23  Shingles rash (053 9) (B02 9)    Current Meds   1  AmLODIPine Besylate 5 MG Oral Tablet; TAKE 1 TABLET DAILY; Last Rx:09Apr2015   Ordered   2  Aspirin 81 MG TABS; TAKE 1 TABLET DAILY; Therapy: 40Acw0700 to (Evaluate:18Jun2016); Last Rx:21Eei8040 Ordered   3  Atorvastatin Calcium 40 MG Oral Tablet Recorded   4  Benazepril HCl - 40 MG Oral Tablet; Therapy: (Kaitlyn Fernandez to Recorded   5  Coenzyme Q10 200 MG Oral Capsule; Take as directed Recorded   6  Daily Multiple Vitamins TABS; Take as directed Recorded   7  Magnesium CAPS; Take 1 cap every other day; Therapy: [phone] 184-756) to Recorded   8   MetFORMIN HCl - 500 MG Oral Tablet; TAKE 1 TABLET TWICE DAILY,  WITH MORNING AND EVENING MEAL; Therapy: (Recorded:15Oct2015) to Recorded   9  Metoprolol Tartrate 25 MG Oral Tablet; Take 1 tablet twice daily; Therapy: 60Wsz9646 to (Evaluate:39Yas0859)  Requested for: 94ZVA2339; Last   Rx:02Tra4305 Ordered   10  Omeprazole 40 MG Oral Capsule Delayed Release; TAKE 1 CAPSULE DAILY; Therapy: (Tala Akutan) to Recorded   11  OneTouch Delica Lancets MISC; Use once a day; Therapy: 84EXH3093 to (77 [phone])  Requested for: 29Oct2013; Last    ZJ:37SDY9048 Ordered   12  OneTouch Ultra Blue In Vitro Strip; TEST ONCE DAILY; Therapy: 31XLP6540 to (77 873 135)  Requested for: 29Oct2013; Last    Rx:29Oct2013 Ordered   13  OneTouch Ultra Mini w/Device Kit; USE AS DIRECTED; Therapy: 31ARY8309 to (Last QC:38PSC2818)  Requested for: 29Oct2013 Ordered   14   Vitamin B-12 TABS; Therapy: (Recorded:02Ntk7704) to Recorded    Allergies    1  No Known Drug Allergies    Signatures   Electronically signed by :  Romeo Barrios, ; Aug 22 2016  2:17PM EST                       (Author)

## 2018-01-17 NOTE — MISCELLANEOUS
Message  Did not hear back from patient after leaving message on machine yesterday  Called again today  Spoke with his wife, who said patient was unavailable because he was gardening  Made her aware that the patient's case was discussed with Dr Louie Packer and that the patient would benefit from an arteriogram as I discussed with him in the office  I asked that patient call back the office and let us know whether or not he would like to proceed   Once we hear from him we will move forward with scheduling Agram       Signatures   Electronically signed by : Abi Rodriguez; Jul 20 2016  2:06PM EST                       (Author)

## 2018-01-17 NOTE — MISCELLANEOUS
Message  Reviewed results of recent FREDO- continues with stenosis to distal bypass graft and anastomosis  We discussed this finding at his last office visit in June 2017  He had no desire to proceed with Agram as he was asymptomatic  Short interval f/u with FREDO with no significant change  LMOM with results  Asked that he notify office if there has been any change in symptoms  Planning for f/u in 6 months        Plan  Bypass graft stenosis    · VAS LOWER LIMB ARTERIAL DUPLEX, COMPLETE BILATERAL/GRAFTS;  SIDE:Bilateral; Status:Hold For - Scheduling; Requested SVV:50MAW6390;     Signatures   Electronically signed by : Deandra Fair; Jul 19 2017  3:38PM EST                       (Author)

## 2018-01-17 NOTE — PROGRESS NOTES
Assessment    1  Postoperative state (V45 89) (Z98 89)   2  Edema of left lower extremity (782 3) (R60 0)   3  History of Bypass Graft Using Vein: Femoral-tibial    Plan     1  Keep your leg elevated whenever you can to decrease swelling and increase circulation ;   Status:Complete;   Done: 81PLU3227   2  Restrict the salt in your diet by avoiding highly salted foods ; Status:Complete;   Done:   31CKG6304    Follow-up visit in 2 weeks Evaluation and Treatment  Follow-up: wound recheck  Status: Hold For - Scheduling  Requested for: 21Jan2016  Ordered; For: PMH: Bypass Graft Using Vein: Femoral-tibial, Edema of left lower extremity, Postoperative state;  Ordered By: Shaka Kebede  Performed:   Due: 35LPT9029     Discussion/Summary    Mr Gumaro Sahni is status post left femoral to tibial bypass on 11/19/15, his postoperative edema continues to improve  He should continue to elevate 4-5 times per day for 15-20 minutes  I have given him a double layer of Tubigrip F, which he should wear daily during waking hours  He should limit his sodium intake (less than 2gm/day)  Small scab removed from right lower extremity with fibrinous exudate, slight erythema present, no malodor  If this area begins draining silver alginate should be applied every other day  I will see him in 2 weeks for recheck of this area and to check his edema  If area expands or becomes more reddened, painful or malodorous he should contact the office  Chief Complaint  " I am here for my post-op check-up "      Active Problems    1  Aortic stenosis (424 1) (I35 0)   2  Aorto-iliac atherosclerosis (440 0,440 20) (I70 0,I70 299)   3  Atheroscler native arteries the extremities w/intermit claudication (440 21) (I70 219)   4  Atherosclerosis of arteries of extremities (440 20) (I70 209)   5  Atherosclerosis of left lower extremity with rest pain (440 22) (I70 222)   6   Atherosclerotic heart disease of native coronary artery without angina pectoris (414 01)   (I25 10)   7  Bilateral lower extremity edema (782 3) (R60 0)   8  Carotid artery stenosis (433 10) (I65 29)   9  Chest pain (786 50) (R07 9)   10  DMII (diabetes mellitus, type 2) (250 00) (E11 9)   11  Headache (784 0) (R51)   12  Hyperlipidemia (272 4) (E78 5)   13  Hypertension (401 9) (I10)   14  Left leg swelling (729 81) (M79 89)   15  Peripheral arterial disease (443 9) (I73 9)   16  Postoperative state (V45 89) (Z98 89)   17  Renal artery stenosis (440 1) (I70 1)   18  Shingles rash (053 9) (B02 9)    Current Meds   1  AmLODIPine Besylate 5 MG Oral Tablet; TAKE 1 TABLET DAILY; Last Rx:09Apr2015   Ordered   2  Aspirin 81 MG Oral Tablet; TAKE 1 TABLET DAILY; Therapy: 29Bqs0439 to (Evaluate:18Jun2016); Last Rx:35Aat5147 Ordered   3  Atorvastatin Calcium 40 MG Oral Tablet Recorded   4  Benazepril HCl - 40 MG Oral Tablet; Therapy: (Leah Gordon) to Recorded   5  Coenzyme Q10 200 MG Oral Capsule; Take as directed Recorded   6  Daily Multiple Vitamins TABS; Take as directed Recorded   7  Gabapentin 300 MG Oral Capsule; TAKE 1 CAPSULE 3 TIMES DAILY; Therapy: 81GPG3835 to (Evaluate:75Dyc5399)  Requested for: 24AQL3554; Last   Rx:12Nov2015 Ordered   8  Magnesium CAPS; Take 1 cap every other day; Therapy: (75 196 772) to Recorded   9  MetFORMIN HCl - 500 MG Oral Tablet; TAKE 1 TABLET TWICE DAILY,  WITH MORNING   AND EVENING MEAL; Therapy: (Recorded:15Oct2015) to Recorded   10  MetFORMIN HCl - 500 MG Oral Tablet; Take 1 tablet twice daily; Therapy: 18TYE0293 to Recorded   11  Metoprolol Tartrate 25 MG Oral Tablet; Take 1 tablet twice daily; Therapy: 55Oua1028 to (Evaluate:11Mjh6612)  Requested for: 19XVX9948; Last    Rx:21Lbh2768 Ordered   12  Omeprazole 40 MG Oral Capsule Delayed Release; TAKE 1 CAPSULE DAILY; Therapy: (Recorded:82Wuh8438) to Recorded   13  OneTouch Delica Lancets MISC; Use once a day;     Therapy: 81YAD0334 to (65770 [phone] 32)  Requested for: 94553 61 83 15; Last    Rx:29Oct2013 Ordered   14  OneTouch Ultra Blue In Vitro Strip; TEST ONCE DAILY; Therapy: 32BKJ1065 to ()  Requested for: 29Oct2013; Last    YJ:41GSE3699 Ordered   15  OneTouch Ultra Mini w/Device Kit; USE AS DIRECTED; Therapy: 94KOG5348 to (Last FR:03YSH5185)  Requested for: 29Oct2013 Ordered   16  SM Vitamin B-12 TABS; Therapy: (Recorded:18Cbe4884) to Recorded    Allergies    1  No Known Drug Allergies    Vitals  Vital Signs [Data Includes: Current Encounter]    Recorded: 21Jan2016 01:52PM   Heart Rate 62, L Radial   Pulse Quality Normal, L Radial   Respiration 18   Respiration Quality Normal   Systolic 336, LUE, Sitting   Diastolic 64, LUE, Sitting     Post Op  Mr Roach is here today for his follow-up s/p Left Superficial Femoral Artery to Anterior Tibial Bypass with Non Reversed Contralateral Saphenous Vein Graft done on 11/19/2015 by Dr Spike Mathew  He denies pain in the legs or feet  He states he does have numbness and tingling in the left toes  He states the podiatrist said his right foot was colder than the left yesterday 1/20/2016  He denies pain while walking  He reports he is walking often  He is compliant with his compression stockings  He states the swelling in his left calf has decreased but the swelling in his feet more the left than the right  He does report some redness and irritation behind the left knee  He offers no further concerns at this time  The patient is status post left femoral-anterior tibial artery bypass  This surgery was performed by Dr Spike Mathew on 11/19/2015  Extra-Anatomic Reconstruction:     HPI: Pre-operatively the patient had experienced symptoms of leg claudication   the patient's pre-operative symptoms have resolved  Post-operatively the patient has experienced leg edema  The patient's post operative symptoms continue  PE:   Graft Assessment: There was a palpable graft pulse at      Distal Pulse Exam:     This patient had palpable pulses in these locations: left DP  Incision: Incisions are healing as expected     Wound:      Future Appointments    Date/Time Provider Specialty Site   02/04/2016 02:15 PM Bessy Jaeger, [facility] THE [address]      Signatures   Electronically signed by : Fuad Portillo; Jan 21 2016  5:30PM EST                       (Author)    Electronically signed by : Elenita Pham DO; Jan 25 2016 10:49AM EST                       (Author)

## 2018-02-12 ENCOUNTER — HOSPITAL ENCOUNTER (OUTPATIENT)
Dept: NON INVASIVE DIAGNOSTICS | Facility: CLINIC | Age: 73
Discharge: HOME | End: 2018-02-12
Payer: MEDICARE

## 2018-02-12 DIAGNOSIS — F17.200 NICOTINE DEPENDENCE, UNCOMPLICATED: ICD-10-CM

## 2018-02-12 DIAGNOSIS — I25.10 ATHEROSCLEROTIC HEART DISEASE OF NATIVE CORONARY ARTERY WITHOUT ANGINA PECTORIS: ICD-10-CM

## 2018-02-12 DIAGNOSIS — I35.0 NONRHEUMATIC AORTIC VALVE STENOSIS: ICD-10-CM

## 2018-02-12 PROCEDURE — 93925 LOWER EXTREMITY STUDY: CPT

## 2018-02-12 PROCEDURE — 93880 EXTRACRANIAL BILAT STUDY: CPT

## 2018-02-12 PROCEDURE — 93923 UPR/LXTR ART STDY 3+ LVLS: CPT

## 2018-02-13 PROCEDURE — 93880 EXTRACRANIAL BILAT STUDY: CPT | Performed by: SURGERY

## 2018-02-13 PROCEDURE — 93925 LOWER EXTREMITY STUDY: CPT | Performed by: SURGERY

## 2018-02-13 PROCEDURE — 93922 UPR/L XTREMITY ART 2 LEVELS: CPT | Performed by: SURGERY

## 2018-03-09 ENCOUNTER — DOCUMENTATION (OUTPATIENT)
Dept: CARDIOLOGY CLINIC | Facility: CLINIC | Age: 73
End: 2018-03-09

## 2018-05-17 DIAGNOSIS — E78.5 HYPERLIPIDEMIA, UNSPECIFIED HYPERLIPIDEMIA TYPE: Primary | ICD-10-CM

## 2018-05-21 RX ORDER — ATORVASTATIN CALCIUM 80 MG/1
TABLET, FILM COATED ORAL
Qty: 90 TABLET | Refills: 2 | Status: SHIPPED | OUTPATIENT
Start: 2018-05-21 | End: 2019-05-31 | Stop reason: SDUPTHER

## 2018-05-29 ENCOUNTER — TELEPHONE (OUTPATIENT)
Dept: CARDIOLOGY CLINIC | Facility: CLINIC | Age: 73
End: 2018-05-29

## 2018-05-29 NOTE — TELEPHONE ENCOUNTER
Jerrilyn Cabot from [facility] called for the cardiac clearance and Plavix hold      Faxed to [phone]

## 2018-06-13 ENCOUNTER — OFFICE VISIT (OUTPATIENT)
Dept: CARDIOLOGY CLINIC | Facility: CLINIC | Age: 73
End: 2018-06-13
Payer: MEDICARE

## 2018-06-13 VITALS
HEIGHT: 70 IN | DIASTOLIC BLOOD PRESSURE: 70 MMHG | BODY MASS INDEX: 26.86 KG/M2 | WEIGHT: 187.6 LBS | SYSTOLIC BLOOD PRESSURE: 140 MMHG | HEART RATE: 58 BPM

## 2018-06-13 DIAGNOSIS — I65.23 ASYMPTOMATIC BILATERAL CAROTID ARTERY STENOSIS: Chronic | ICD-10-CM

## 2018-06-13 DIAGNOSIS — I70.209 ARTERIOSCLEROSIS OF ARTERIES OF EXTREMITIES (HCC): ICD-10-CM

## 2018-06-13 DIAGNOSIS — I10 ESSENTIAL HYPERTENSION: Chronic | ICD-10-CM

## 2018-06-13 DIAGNOSIS — I25.10 CORONARY ARTERY DISEASE INVOLVING NATIVE CORONARY ARTERY OF NATIVE HEART WITHOUT ANGINA PECTORIS: Primary | ICD-10-CM

## 2018-06-13 DIAGNOSIS — Z95.1 S/P CABG (CORONARY ARTERY BYPASS GRAFT): Chronic | ICD-10-CM

## 2018-06-13 DIAGNOSIS — I77.9 AORTO-ILIAC DISEASE (HCC): Chronic | ICD-10-CM

## 2018-06-13 DIAGNOSIS — I25.10 MULTIPLE VESSEL CORONARY ARTERY DISEASE: ICD-10-CM

## 2018-06-13 DIAGNOSIS — E78.5 DYSLIPIDEMIA: Chronic | ICD-10-CM

## 2018-06-13 PROCEDURE — 93000 ELECTROCARDIOGRAM COMPLETE: CPT | Performed by: INTERNAL MEDICINE

## 2018-06-13 PROCEDURE — 99214 OFFICE O/P EST MOD 30 MIN: CPT | Performed by: INTERNAL MEDICINE

## 2018-06-13 RX ORDER — PANTOPRAZOLE SODIUM 20 MG/1
20 TABLET, DELAYED RELEASE ORAL DAILY
COMMUNITY
End: 2018-11-19 | Stop reason: ALTCHOICE

## 2018-06-13 RX ORDER — HYDROCHLOROTHIAZIDE 12.5 MG/1
12.5 CAPSULE, GELATIN COATED ORAL DAILY
COMMUNITY
End: 2018-06-13 | Stop reason: SDUPTHER

## 2018-06-13 RX ORDER — HYDROCHLOROTHIAZIDE 12.5 MG/1
12.5 CAPSULE, GELATIN COATED ORAL DAILY
Qty: 90 CAPSULE | Refills: 3 | Status: SHIPPED | OUTPATIENT
Start: 2018-06-13 | End: 2019-04-02 | Stop reason: SDUPTHER

## 2018-06-13 RX ORDER — CLOPIDOGREL BISULFATE 75 MG/1
75 TABLET ORAL DAILY
COMMUNITY
End: 2018-09-25 | Stop reason: SDUPTHER

## 2018-06-13 NOTE — PATIENT INSTRUCTIONS
Recommendations:  1  Increase HCTZ to 12 5mg daily  2  Continue remainder of medications  3  Patient to follow up with Vascular Surgery to evaluate for carotid artery disease  4  Check basic metabolic profile in 2 week  5  Follow up in 3 months

## 2018-06-13 NOTE — PROGRESS NOTES
Cardiology   Dolores Garcia  74 y o  male MRN: [id number]        Impression:  1  Coronary artery disease s/p CABG - doing well  2  Hypertension - good control  3  Severe bilateral carotid disease  4  Dyslipidemia - doing well  5  Peripheral arterial disease - s/p LLE revascularization  Doing well  6  Aortic stenosis - moderate by echo 10/17      Recommendations:  1  Increase HCTZ to 12 5mg daily  2  Continue remainder of medications  3  Patient to follow up with Vascular Surgery to evaluate for carotid artery disease  4  Check basic metabolic profile in 2 week  5  Follow up in 3 months  HPI: Dolores Garcia  is a 74y o  year old male with coronary artery disease s/p CABG and LLE revascularization who returns for follow up  No chest pain, shortness of breath, or palpitations  Blood pressure has been elevated - 160 in PCP office  Started on HCTZ 12 5 every other day  Also recently had possible food poisoning from ice cream         Review of Systems   Constitutional: Negative  HENT: Negative  Eyes: Negative  Respiratory: Negative for chest tightness and shortness of breath  Cardiovascular: Negative for chest pain, palpitations and leg swelling  Gastrointestinal: Positive for diarrhea  Endocrine: Negative  Genitourinary: Negative  Musculoskeletal: Negative  Skin: Negative  Allergic/Immunologic: Negative  Neurological: Positive for headaches  Hematological: Negative  Psychiatric/Behavioral: Negative  All other systems reviewed and are negative          Past Medical History:   Diagnosis Date    [facility]     CAD (coronary artery disease)     Diabetes [facility]     Dyslipidemia     GERD (gastroesophageal reflux disease)     Hypertension     [facility]      Past Surgical History:   Procedure Laterality Date    APPENDECTOMY      COLECTOMY      CORONARY ARTERY BYPASS GRAFT  2013    X 2    FEMORAL ARTERY - POPLITEAL ARTERY BYPASS GRAFT      [place] SLCTV CATHJ 3RD+ ORD SLCTV ABDL PEL/LXTR [facility] Left 8/12/2016    Procedure: LEFT FEMORAL ARTERIOGRAM; BALLOON ANGIOPLASTY; SFA  AND FEMORAL AT VEIN GRAFT;  Surgeon: Cayden Campos MD;  [facility];  Service: Vascular    TONSILLECTOMY AND ADENOIDECTOMY       History   Alcohol Use    1 2 - 1 8 oz/week    2 - 3 Glasses of wine per week     Comment: daily  occasional beer     History   Drug Use No     History   Smoking Status    Current Every Day Smoker    Packs/day: 0 25   Smokeless Tobacco    Never Used     Family History   Problem Relation Age of Onset    Heart attack Father        Allergies:  No Known Allergies    Medications:     Current Outpatient Prescriptions:     amLODIPine (NORVASC) 5 mg tablet, Take 10 mg by mouth daily  , Disp: , Rfl:     atorvastatin (LIPITOR) 80 mg tablet, TAKE 1 TABLET AT BEDTIME, Disp: 90 tablet, Rfl: 2    benazepril (LOTENSIN) 40 MG tablet, Take 40 mg by mouth daily  , Disp: , Rfl:     clopidogrel (PLAVIX) 75 mg tablet, Take 75 mg by mouth daily, Disp: , Rfl:     hydrochlorothiazide (MICROZIDE) 12 5 mg capsule, Take 12 5 mg by mouth daily, Disp: , Rfl:     metFORMIN (GLUCOPHAGE) 1000 MG tablet, Take 1,000 mg by mouth 2 (two) times a day with meals  , Disp: , Rfl:     metoprolol tartrate (LOPRESSOR) 25 mg tablet, Take 25 mg by mouth 2 (two) times a day , Disp: , Rfl:     Multiple Vitamin (MULTIVITAMIN) tablet, Take 1 tablet by mouth daily  , Disp: , Rfl:     pantoprazole (PROTONIX) 20 mg tablet, Take 20 mg by mouth daily, Disp: , Rfl:     omeprazole (PriLOSEC) 40 MG capsule, Take 40 mg by mouth daily  , Disp: , Rfl:       Wt Readings from Last 3 Encounters:   06/13/18 85 1 kg (187 lb 9 6 oz)   11/27/17 87 7 kg (193 lb 4 oz)   09/12/17 85 3 kg (188 lb)     Temp Readings from Last 3 Encounters:   08/12/16 99 1 °F (37 3 °C) (Tympanic Core)   08/03/16 98 9 °F (37 2 °C) (Tympanic Core)     BP Readings from Last 3 Encounters:   18 140/70   17 120/60   17 138/60     Pulse Readings from Last 3 Encounters:   18 58   17 58   17 56         Physical Exam   Constitutional: He is oriented to person, place, and time  He appears well-developed  HENT:   Head: Normocephalic  Eyes: EOM are normal    Neck: Normal range of motion  Cardiovascular: Normal rate, regular rhythm and normal heart sounds  Exam reveals no gallop and no friction rub  No murmur heard  Pulmonary/Chest: Effort normal and breath sounds normal  No respiratory distress  He has no wheezes  He has no rales  Abdominal: Soft  Bowel sounds are normal  He exhibits no distension  There is no tenderness  There is no guarding  Musculoskeletal: Normal range of motion  Neurological: He is alert and oriented to person, place, and time  Skin: Skin is warm and dry  Psychiatric: He has a normal mood and affect           Laboratory Studies:  CMP:  Lab Results   Component Value Date     2015    K 4 1 2015     2015    CO2 28 2015    ANIONGAP 7 2015    BUN 17 2017    CREATININE 1 15 2017    GLUCOSE 125 2015    EGFR 62 2017     Cardiac testing:   EKG reviewed personally: KIERA GERARDO  Results for orders placed during the hospital encounter of 08/10/17   Echo complete with contrast if indicated    Narrative [facility], [address] [facility]  [phone]    Transthoracic Echocardiogram  2D, M-mode, Doppler, and Color Doppler    Study date:  10-Aug-2017    Patient: Richa Potter  MR number: [de-identified]  Account number: [de-identified]  : 1945  Age: 74 years  Gender: Male  Status: Outpatient  Location: [place] [address]   Height: 68 in  Weight: 192 lb  BP: 170/ 60 mmHg    Indications: Essential (primary) Hypertension    Diagnoses: I10  - Essential (primary) hypertension    Sonographer:  BOLIVAR Villanueva,RDCS  Primary Physician: Joslyn Treadwell MD  Referring Physician:  Placido Milligan MD  Group:  [facility]  Interpreting Physician:  Musa Monet MD    SUMMARY    LEFT VENTRICLE:  Systolic function was normal  Ejection fraction was estimated to be 68 %  There were no regional wall motion abnormalities  Wall thickness was mildly increased  There was mild concentric hypertrophy  Doppler parameters were consistent with abnormal left ventricular relaxation (grade 1 diastolic dysfunction)  LEFT ATRIUM:  The atrium was mildly dilated  AORTIC VALVE:  The valve was trileaflet  Leaflets exhibited moderately increased thickness and moderate calcification  There was moderate stenosis  HISTORY: PRIOR HISTORY: Hyperlipidemia associated with Type 2 Diabetes Mellitus, Multiple vessel Coronary Artery Disease, Progressive Angina, Stenosis of Noncoronary Bypass Graft, Hypertension, Peripheral Artery Disease, Aortic Stenosis    PROCEDURE: The study was performed in the [address]  This was a routine study  The transthoracic approach was used  The study included complete 2D imaging, M-mode, complete spectral Doppler, and color Doppler  The heart rate was  47 bpm, at the start of the study  Images were obtained from the parasternal, apical, subcostal, and suprasternal notch acoustic windows  Echocardiographic views were limited due to poor acoustic window availability, decreased penetration,  and lung interference  This was a technically difficult study  LEFT VENTRICLE: Size was normal  Systolic function was normal  Ejection fraction was estimated to be 68 %  There were no regional wall motion abnormalities  Wall thickness was mildly increased  There was mild concentric hypertrophy  DOPPLER: Doppler parameters were consistent with abnormal left ventricular relaxation (grade 1 diastolic dysfunction)      RIGHT VENTRICLE: The size was normal  Systolic function was normal  Wall thickness was normal     LEFT ATRIUM: The atrium was mildly dilated  RIGHT ATRIUM: Size was normal     MITRAL VALVE: Valve structure was normal  There was normal leaflet separation  DOPPLER: The transmitral velocity was within the normal range  There was no evidence for stenosis  There was no regurgitation  AORTIC VALVE: The valve was trileaflet  Leaflets exhibited moderately increased thickness and moderate calcification  DOPPLER: Transaortic velocity was within the normal range  There was moderate stenosis  There was no regurgitation  TRICUSPID VALVE: The valve structure was normal  There was normal leaflet separation  DOPPLER: The transtricuspid velocity was within the normal range  There was no evidence for stenosis  There was no regurgitation  PULMONIC VALVE: Leaflets exhibited normal thickness, no calcification, and normal cuspal separation  DOPPLER: The transpulmonic velocity was within the normal range  There was no regurgitation  PERICARDIUM: There was no pericardial effusion  AORTA: The root exhibited normal size  SYSTEM MEASUREMENT TABLES    2D  %FS: 34 34 %  AV Diam: 3 23 cm  EDV(Teich): 75 98 ml  EF(Teich): 63 83 %  ESV(Teich): 27 48 ml  IVSd: 1 42 cm  LA Area: 23 27 cm2  LA Diam: 4 22 cm  LVEDV MOD A4C: 105 37 ml  LVEF MOD A4C: 49 99 %  LVESV MOD A4C: 52 7 ml  LVIDd: 4 14 cm  LVIDs: 2 72 cm  LVLd A4C: 9 61 cm  LVLs A4C: 8 25 cm  LVOT Diam: 2 06 cm  LVPWd: 1 32 cm  RA Area: 18 68 cm2  RV Diam : 2 82 cm  SV MOD A4C: 52 68 ml  SV(Cube): 50 89 ml  SV(Teich): 48 49 ml    CW  AV Env  Ti: 428 84 ms  AV MaxP 13 mmHg  AV VTI: 71 52 cm  AV Vmax: 2 46 m/s  AV Vmean: 1 67 m/s  AV meanP 16 mmHg  TR MaxP 27 mmHg  TR Vmax: 1 52 m/s    MM  TAPSE: 2 62 cm    PW  LUIS (VTI): 1 65 cm2  LUIS Vmax: 1 32 cm2  LVOT Env  Ti: 554 53 ms  LVOT VTI: 35 31 cm  LVOT Vmax: 0 98 m/s  LVOT Vmean: 0 64 m/s  LVOT maxPG: 3 8 mmHg  LVOT meanP 89 mmHg    [employer] Accredited Echocardiography Laboratory    Prepared and electronically signed by    Mara Irwin MD  Signed 10-Aug-2017 15:03:44

## 2018-07-09 DIAGNOSIS — I65.23 BILATERAL CAROTID ARTERY STENOSIS: Primary | ICD-10-CM

## 2018-08-14 ENCOUNTER — HOSPITAL ENCOUNTER (OUTPATIENT)
Dept: RADIOLOGY | Facility: MEDICAL CENTER | Age: 73
Discharge: HOME | End: 2018-08-14
Payer: MEDICARE

## 2018-08-14 DIAGNOSIS — I65.23 BILATERAL CAROTID ARTERY STENOSIS: ICD-10-CM

## 2018-08-14 PROCEDURE — 93880 EXTRACRANIAL BILAT STUDY: CPT

## 2018-08-16 PROCEDURE — 93880 EXTRACRANIAL BILAT STUDY: CPT | Performed by: SURGERY

## 2018-09-14 DIAGNOSIS — I65.23 BILATERAL CAROTID ARTERY STENOSIS: Primary | ICD-10-CM

## 2018-09-20 ENCOUNTER — TELEPHONE (OUTPATIENT)
Dept: VASCULAR SURGERY | Facility: CLINIC | Age: 73
End: 2018-09-20

## 2018-09-25 DIAGNOSIS — I25.10 CORONARY ARTERY DISEASE INVOLVING NATIVE HEART WITHOUT ANGINA PECTORIS, UNSPECIFIED VESSEL OR LESION TYPE: Primary | ICD-10-CM

## 2018-09-26 RX ORDER — CLOPIDOGREL BISULFATE 75 MG/1
TABLET ORAL
Qty: 90 TABLET | Refills: 3 | Status: SHIPPED | OUTPATIENT
Start: 2018-09-26 | End: 2019-10-30 | Stop reason: SDUPTHER

## 2018-10-03 ENCOUNTER — OFFICE VISIT (OUTPATIENT)
Dept: CARDIOLOGY CLINIC | Facility: MEDICAL CENTER | Age: 73
End: 2018-10-03
Payer: MEDICARE

## 2018-10-03 VITALS
BODY MASS INDEX: 24.68 KG/M2 | WEIGHT: 176.3 LBS | DIASTOLIC BLOOD PRESSURE: 60 MMHG | SYSTOLIC BLOOD PRESSURE: 136 MMHG | OXYGEN SATURATION: 97 % | HEIGHT: 71 IN | HEART RATE: 66 BPM

## 2018-10-03 DIAGNOSIS — E78.5 DYSLIPIDEMIA: Chronic | ICD-10-CM

## 2018-10-03 DIAGNOSIS — I73.9 PAD (PERIPHERAL ARTERY DISEASE) (HCC): ICD-10-CM

## 2018-10-03 DIAGNOSIS — Z95.1 S/P CABG (CORONARY ARTERY BYPASS GRAFT): Chronic | ICD-10-CM

## 2018-10-03 DIAGNOSIS — I10 ESSENTIAL HYPERTENSION: Chronic | ICD-10-CM

## 2018-10-03 DIAGNOSIS — I25.10 MULTIPLE VESSEL CORONARY ARTERY DISEASE: ICD-10-CM

## 2018-10-03 DIAGNOSIS — I65.23 ASYMPTOMATIC BILATERAL CAROTID ARTERY STENOSIS: Primary | Chronic | ICD-10-CM

## 2018-10-03 PROCEDURE — 99214 OFFICE O/P EST MOD 30 MIN: CPT | Performed by: INTERNAL MEDICINE

## 2018-10-03 NOTE — PROGRESS NOTES
Cardiology   Lovely Odonnell  74 y o  male MRN: [id number]        Impression:  1  Coronary artery disease s/p CABG - doing well  2  Hypertension - good control  3  Severe bilateral carotid disease  4  Dyslipidemia - doing well  5  Peripheral arterial disease - s/p LLE revascularization  Doing well  6  Aortic stenosis - moderate by echo 10/17      Recommendations:  1  Continue current medications  2  Follow up in 6 months  HPI: Lovely Odonnell  is a 74y o  year old male with coronary artery disease s/p CABG and LLE revascularization who returns for follow up  No chest pain, shortness of breath, or palpitations  Recently diagnosed with pancreatic insufficiency  Also had some colonic surgery  No chest pain, shortness of breath, or palpitations  Followed by Vascular Surgery for carotid and peripheral vascular disease  Review of Systems   Constitutional: Negative  HENT: Negative  Eyes: Negative  Respiratory: Negative for chest tightness and shortness of breath  Cardiovascular: Negative for chest pain, palpitations and leg swelling  Gastrointestinal: Positive for constipation and diarrhea  Endocrine: Negative  Genitourinary: Negative  Musculoskeletal: Negative  Skin: Negative  Allergic/Immunologic: Negative  Neurological: Negative  Hematological: Negative  Psychiatric/Behavioral: Negative  All other systems reviewed and are negative          Past Medical History:   Diagnosis Date    [facility]     CAD (coronary artery disease)     Diabetes (HCC)     Dyslipidemia     GERD (gastroesophageal reflux disease)     Hypertension     [facility]      Past Surgical History:   Procedure Laterality Date    APPENDECTOMY      COLECTOMY      COLONOSCOPY      CORONARY ARTERY BYPASS GRAFT  2013    X 2    FEMORAL ARTERY - POPLITEAL ARTERY BYPASS GRAFT      HEMORRHOID SURGERY      [place] SLCTV CATHJ 3RD+ ORD SLCTV ABDL PEL/LXTR [facility] Left 8/12/2016    Procedure: LEFT FEMORAL ARTERIOGRAM; BALLOON ANGIOPLASTY; SFA  AND FEMORAL AT VEIN GRAFT;  Surgeon: Jean-Paul Cota MD;  [facility];  Service: Vascular    TONSILLECTOMY AND ADENOIDECTOMY       History   Alcohol Use    1 2 - 1 8 oz/week    2 - 3 Glasses of wine per week     Comment: daily  occasional beer     History   Drug Use No     History   Smoking Status    Current Every Day Smoker    Packs/day: 0 25   Smokeless Tobacco    Never Used     Family History   Problem Relation Age of Onset    Heart attack Father     Other Sister         bypass and vlave replacement    Stroke Paternal Uncle     Arrhythmia Neg Hx     Asthma Neg Hx     Clotting disorder Neg Hx     Fainting Neg Hx     Anuerysm Neg Hx     Hypertension Neg Hx         unsure     Hyperlipidemia Neg Hx     Heart failure Neg Hx        Allergies:  No Known Allergies    Medications:     Current Outpatient Prescriptions:     amLODIPine (NORVASC) 5 mg tablet, Take 10 mg by mouth daily  , Disp: , Rfl:     atorvastatin (LIPITOR) 80 mg tablet, TAKE 1 TABLET AT BEDTIME, Disp: 90 tablet, Rfl: 2    benazepril (LOTENSIN) 40 MG tablet, Take 40 mg by mouth daily  , Disp: , Rfl:     clopidogrel (PLAVIX) 75 mg tablet, TAKE 1 TABLET EVERY DAY, Disp: 90 tablet, Rfl: 3    hydrochlorothiazide (MICROZIDE) 12 5 mg capsule, Take 1 capsule (12 5 mg total) by mouth daily, Disp: 90 capsule, Rfl: 3    metFORMIN (GLUCOPHAGE) 1000 MG tablet, Take 1,000 mg by mouth 2 (two) times a day with meals  , Disp: , Rfl:     metoprolol tartrate (LOPRESSOR) 25 mg tablet, Take 25 mg by mouth 2 (two) times a day , Disp: , Rfl:     Multiple Vitamin (MULTIVITAMIN) tablet, Take 1 tablet by mouth daily  , Disp: , Rfl:     Pancrelipase, Lip-Prot-Amyl, (ZENPEP PO), Take by mouth, Disp: , Rfl:     omeprazole (PriLOSEC) 40 MG capsule, Take 40 mg by mouth daily  , Disp: , Rfl:     pantoprazole (PROTONIX) 20 mg tablet, Take 20 mg by mouth daily, Disp: , Rfl:       Wt Readings from Last 3 Encounters:   10/03/18 80 kg (176 lb 4 8 oz)   18 85 1 kg (187 lb 9 6 oz)   17 87 7 kg (193 lb 4 oz)     Temp Readings from Last 3 Encounters:   16 99 1 °F (37 3 °C) (Tympanic Core)   16 98 9 °F (37 2 °C) (Tympanic Core)     BP Readings from Last 3 Encounters:   10/03/18 136/60   18 140/70   17 120/60     Pulse Readings from Last 3 Encounters:   10/03/18 66   18 58   17 58         Physical Exam   Constitutional: He is oriented to person, place, and time  He appears well-developed  HENT:   Head: Atraumatic  Eyes: EOM are normal    Neck: Normal range of motion  Cardiovascular: Normal rate, regular rhythm and normal heart sounds  Exam reveals no gallop and no friction rub  No murmur heard  Pulmonary/Chest: Effort normal and breath sounds normal  No respiratory distress  He has no wheezes  He has no rales  Abdominal: Soft  Musculoskeletal: Normal range of motion  Neurological: He is alert and oriented to person, place, and time  Skin: Skin is warm and dry  Psychiatric: He has a normal mood and affect           Laboratory Studies:  CMP:  Lab Results   Component Value Date     2015    K 4 1 2015     2015    CO2 28 2015    ANIONGAP 7 2015    BUN 17 2017    CREATININE 1 15 2017    GLUCOSE 125 2015    EGFR 62 2017     Cardiac testing:     Results for orders placed during the hospital encounter of 08/10/17   Echo complete with contrast if indicated    Narrative [facility], [address] [facility]  [phone]    Transthoracic Echocardiogram  2D, M-mode, Doppler, and Color Doppler    Study date:  10-Aug-2017    Patient: Jonathan Saldaña  MR number: [de-identified]  Account number: [de-identified]  : 1945  Age: 74 years  Gender: Male  Status: Outpatient  Location: [place] [address]   Height: 68 in  Weight: 192 lb  BP: 170/ 60 mmHg    Indications: Essential (primary) Hypertension    Diagnoses: I10  - Essential (primary) hypertension    Sonographer:  BOLIVAR Pollack,RDCS  Primary Physician:  Ko Fry MD  Referring Physician:  Arjun Manley MD  Group:  [facility]  Interpreting Physician:  María Tesfaye MD    SUMMARY    LEFT VENTRICLE:  Systolic function was normal  Ejection fraction was estimated to be 68 %  There were no regional wall motion abnormalities  Wall thickness was mildly increased  There was mild concentric hypertrophy  Doppler parameters were consistent with abnormal left ventricular relaxation (grade 1 diastolic dysfunction)  LEFT ATRIUM:  The atrium was mildly dilated  AORTIC VALVE:  The valve was trileaflet  Leaflets exhibited moderately increased thickness and moderate calcification  There was moderate stenosis  HISTORY: PRIOR HISTORY: Hyperlipidemia associated with Type 2 Diabetes Mellitus, Multiple vessel Coronary Artery Disease, Progressive Angina, Stenosis of Noncoronary Bypass Graft, Hypertension, Peripheral Artery Disease, Aortic Stenosis    PROCEDURE: The study was performed in the [address]  This was a routine study  The transthoracic approach was used  The study included complete 2D imaging, M-mode, complete spectral Doppler, and color Doppler  The heart rate was  47 bpm, at the start of the study  Images were obtained from the parasternal, apical, subcostal, and suprasternal notch acoustic windows  Echocardiographic views were limited due to poor acoustic window availability, decreased penetration,  and lung interference  This was a technically difficult study  LEFT VENTRICLE: Size was normal  Systolic function was normal  Ejection fraction was estimated to be 68 %  There were no regional wall motion abnormalities  Wall thickness was mildly increased  There was mild concentric hypertrophy    DOPPLER: Doppler parameters were consistent with abnormal left ventricular relaxation (grade 1 diastolic dysfunction)  RIGHT VENTRICLE: The size was normal  Systolic function was normal  Wall thickness was normal     LEFT ATRIUM: The atrium was mildly dilated  RIGHT ATRIUM: Size was normal     MITRAL VALVE: Valve structure was normal  There was normal leaflet separation  DOPPLER: The transmitral velocity was within the normal range  There was no evidence for stenosis  There was no regurgitation  AORTIC VALVE: The valve was trileaflet  Leaflets exhibited moderately increased thickness and moderate calcification  DOPPLER: Transaortic velocity was within the normal range  There was moderate stenosis  There was no regurgitation  TRICUSPID VALVE: The valve structure was normal  There was normal leaflet separation  DOPPLER: The transtricuspid velocity was within the normal range  There was no evidence for stenosis  There was no regurgitation  PULMONIC VALVE: Leaflets exhibited normal thickness, no calcification, and normal cuspal separation  DOPPLER: The transpulmonic velocity was within the normal range  There was no regurgitation  PERICARDIUM: There was no pericardial effusion  AORTA: The root exhibited normal size  SYSTEM MEASUREMENT TABLES    2D  %FS: 34 34 %  AV Diam: 3 23 cm  EDV(Teich): 75 98 ml  EF(Teich): 63 83 %  ESV(Teich): 27 48 ml  IVSd: 1 42 cm  LA Area: 23 27 cm2  LA Diam: 4 22 cm  LVEDV MOD A4C: 105 37 ml  LVEF MOD A4C: 49 99 %  LVESV MOD A4C: 52 7 ml  LVIDd: 4 14 cm  LVIDs: 2 72 cm  LVLd A4C: 9 61 cm  LVLs A4C: 8 25 cm  LVOT Diam: 2 06 cm  LVPWd: 1 32 cm  RA Area: 18 68 cm2  RV Diam : 2 82 cm  SV MOD A4C: 52 68 ml  SV(Cube): 50 89 ml  SV(Teich): 48 49 ml    CW  AV Env  Ti: 428 84 ms  AV MaxP 13 mmHg  AV VTI: 71 52 cm  AV Vmax: 2 46 m/s  AV Vmean: 1 67 m/s  AV meanP 16 mmHg  TR MaxP 27 mmHg  TR Vmax: 1 52 m/s    MM  TAPSE: 2 62 cm    PW  LUIS (VTI): 1 65 cm2  LUIS Vmax: 1 32 cm2  LVOT Env  Ti: 554 53 ms  LVOT VTI: 35 31 cm  LVOT Vmax: 0 98 m/s  LVOT Vmean: 0 64 m/s  LVOT maxPG: 3 8 mmHg  LVOT meanP 89 mmHg    [facility] Accredited Echocardiography Laboratory    Prepared and electronically signed by    Teodora Dubin, MD  Signed 10-Aug-2017 15:03:44

## 2018-10-11 ENCOUNTER — HOSPITAL ENCOUNTER (OUTPATIENT)
Dept: RADIOLOGY | Facility: MEDICAL CENTER | Age: 73
Discharge: HOME | End: 2018-10-11
Payer: MEDICARE

## 2018-10-11 DIAGNOSIS — T82.858A STENOSIS OF OTHER VASCULAR PROSTHETIC DEVICES, IMPLANTS AND GRAFTS, INITIAL ENCOUNTER (HCC): ICD-10-CM

## 2018-10-11 PROCEDURE — 93922 UPR/L XTREMITY ART 2 LEVELS: CPT | Performed by: SURGERY

## 2018-10-11 PROCEDURE — 93923 UPR/LXTR ART STDY 3+ LVLS: CPT

## 2018-10-11 PROCEDURE — 93925 LOWER EXTREMITY STUDY: CPT

## 2018-10-11 PROCEDURE — 93925 LOWER EXTREMITY STUDY: CPT | Performed by: SURGERY

## 2018-10-19 RX ORDER — DIPHENOXYLATE HYDROCHLORIDE AND ATROPINE SULFATE 2.5; .025 MG/1; MG/1
TABLET ORAL
Refills: 0 | COMMUNITY
Start: 2018-09-05 | End: 2018-11-19 | Stop reason: ALTCHOICE

## 2018-10-22 ENCOUNTER — OFFICE VISIT (OUTPATIENT)
Dept: VASCULAR SURGERY | Facility: CLINIC | Age: 73
End: 2018-10-22
Payer: MEDICARE

## 2018-10-22 VITALS
WEIGHT: 176 LBS | HEART RATE: 55 BPM | TEMPERATURE: 98.4 F | BODY MASS INDEX: 25.2 KG/M2 | SYSTOLIC BLOOD PRESSURE: 130 MMHG | HEIGHT: 70 IN | DIASTOLIC BLOOD PRESSURE: 60 MMHG

## 2018-10-22 DIAGNOSIS — I70.213 ATHEROSCLEROSIS OF NATIVE ARTERIES OF EXTREMITIES WITH INTERMITTENT CLAUDICATION, BILATERAL LEGS (HCC): Primary | ICD-10-CM

## 2018-10-22 DIAGNOSIS — E78.5 DYSLIPIDEMIA: Chronic | ICD-10-CM

## 2018-10-22 DIAGNOSIS — F17.219 CIGARETTE NICOTINE DEPENDENCE WITH NICOTINE-INDUCED DISORDER: ICD-10-CM

## 2018-10-22 PROCEDURE — 99214 OFFICE O/P EST MOD 30 MIN: CPT | Performed by: NURSE PRACTITIONER

## 2018-10-22 NOTE — ASSESSMENT & PLAN NOTE
-LEAD (10/11/18):  R popliteal occlusion, distal PT occlusion, R SAMEER 0 35/43/21; L fem-AT bypass patent with worsening distal graft stenosis >75% and 50-75% distal anastomosis stenosis, L SAMEER 0 80/72/43  -Experiencing intermittent calf claudication    Denies rest pain  -Remains with preserved SAMEER on the L and easily palpable graft pulse  -Due to significant decrease in L SAMEER 0 35 and new claudication symptoms will plan to proceed with angiogram  -We discussed the risks vs benefits of angiogram and he would like to proceed  -Will plan for bilateral lower extremity angiogram with possible treatment of the RLE and diagnostic imaging of bypass graft on left  -Continue aspirin and Plavix, do not hold for Agram  Continue atorvastatin

## 2018-10-22 NOTE — LETTER
October 22, 2018     Gianni Sharpe MD  [address] [facility] Male [address]    Patient: Joselito Ibarra  YOB: 1945   Date of Visit: 10/22/2018       Dear Dr Jd Ang:    Thank you for referring Tatum Perez to me for evaluation  Below are my notes for this consultation  If you have questions, please do not hesitate to call me  I look forward to following your patient along with you           Sincerely,        ZHANNA Champion        CC: No Recipients

## 2018-10-22 NOTE — ASSESSMENT & PLAN NOTE
-asymptomatic bilateral carotid stenosis  -CV duplex (8/14/18): Bilateral >70% ICA stenosis with unchanged velocities, as compared to prior duplex and CTA neck done in August 2017  -continue medical management with aspirin and statin therapy  -will continue routine monitoring with CV duplex every 6 months  -reviewed signs/symptoms of stroke    Instructed to go to nearest ER with any stroke symptoms

## 2018-10-22 NOTE — ASSESSMENT & PLAN NOTE
-recent increase in smoking to 1 ppd from 1/4 ppd  -we discussed the benefits of smoking cessation in relation to progression of arterial disease  -he has no desire to attempt cessation at this time

## 2018-10-22 NOTE — PATIENT INSTRUCTIONS
Peripheral arterial disease with intermittent claudication to bilateral calves  History of left femoral tibial bypass in 2015 status post angioplasty ofbypass graft stenosis in 2016  - we will proceed with scheduling an angiogram with runoff and treatment of your right lower extremity  We will also image your left lower extremity at that time  - no need to hold aspirin or Plavix    Please continue both  - get your lab work done wherever you normally get your lab work done  - our surgery schedulers will be reaching out to you via telephone to confirm a date when Dr Aliya Elaine will be able to perform your angiogram     Bilateral carotid artery stenosis  - this will continue to be monitored with a carotid duplex twice per year/every 6 months  - you will scheduled for your February appointment prior to leaving today's visit  - continue aspirin and atorvastatin

## 2018-10-22 NOTE — PROGRESS NOTES
Assessment/Plan:    73 y/o M with HTN, HLD, DM II, CAD s/p CABG, carotid stenosis, and PAD s/p L fem-AT bypass 11/2015 (Dr Nasim Gonzáles), s/p angioplasty to multiple areas bypass graft stenosis 8/2016, seen for review after recent arterial duplex  Atherosclerosis of native arteries of extremities with intermittent claudication, bilateral legs (HCC)  -LEAD (10/11/18):  R popliteal occlusion, distal PT occlusion, R SAMEER 0 35/43/21; L fem-AT bypass patent with worsening distal graft stenosis >75% and 50-75% distal anastomosis stenosis, L SAMEER 0 80/72/43  -Experiencing intermittent calf claudication  Denies rest pain  -Remains with preserved SAMEER on the L and easily palpable graft pulse  -Due to significant decrease in L SAMEER 0 35 and new claudication symptoms will plan to proceed with angiogram  -We discussed the risks vs benefits of angiogram and he would like to proceed  -Will plan for bilateral lower extremity angiogram with possible treatment of the RLE and diagnostic imaging of bypass graft on left  -Continue aspirin and Plavix, do not hold for Agram  Continue atorvastatin      Asymptomatic bilateral carotid artery stenosis  -asymptomatic bilateral carotid stenosis  -CV duplex (8/14/18): Bilateral >70% ICA stenosis with unchanged velocities, as compared to prior duplex and CTA neck done in August 2017  -continue medical management with aspirin and statin therapy  -will continue routine monitoring with CV duplex every 6 months  -reviewed signs/symptoms of stroke    Instructed to go to nearest ER with any stroke symptoms    Dyslipidemia  -continue Atorvastatin    Cigarette nicotine dependence with nicotine-induced disorder  -recent increase in smoking to 1 ppd from 1/4 ppd  -we discussed the benefits of smoking cessation in relation to progression of arterial disease  -he has no desire to attempt cessation at this time    Operative Scheduling Information:    Hospital:  [facility]    Physician:  Ese Wayne    Surgery: Abdominal aortogram with bilateral lower extremity runoff possible RLE intervention/PTA/stenting, diagnostic imaging of LLE bypass     Urgency:  Standard    Case Length:  Normal    Post-op Bed:  Outpatient    OR Table:  IR    Equipment Needs:  None    Medication Instructions:  Aspirin:   Continue (do not hold)  Plavix:  Continue (do not hold)    Hydration: To be determined based off results of BMP     Diagnoses and all orders for this visit:    Atherosclerosis of native arteries of extremities with intermittent claudication, bilateral legs ([address] )  -     Basic metabolic panel; Future  -     CBC and Platelet; Future  -     Protime-INR; Future    Dyslipidemia    Cigarette nicotine dependence with nicotine-induced disorder    Other orders  -     diphenoxylate-atropine (LOMOTIL) 2 5-0 025 mg per tablet; TAKE 2 TABLETS BY MOUTH 4 TIMES DAILY  -     Notify Physician in PT/INR greater than 1 8 and/or Creatine Clearance (gFR)  is less than 60  ml/clarita/1 73sq m; Standing  -     Height and Weight Upon Arrival; Standing  -     Nursing communcation Apply snapless gown prior to procedure; Standing  -     Have Patient Void On Call to Procedure Room; Standing  -     Insert and Maintain IV; Standing  -     IR lower extremity / intervention; Standing  -     Diet NPO; Sips with meds; Standing  -     Nursing communcation If patient on diabetic medications, nurse to notify physician to ensure the medications are adjusted preprocedure ; Standing  -     IR lower extremity / intervention          Subjective: " I am here to review by test results  Patient ID: Kaleb Maxwell  is a 74 y o  male  Patient FREDO done 10/11/2018 and he also had a CV done on 8/14/2018  Pt is having fatigue while walking in both legs  He states that he can walk at least 2 blocks before he starts with tightness in his both legs  He states that rest helps relieve discomfort  The tightness is in both of his calves  Pt is having no TIA or stroke symptoms   His face has no droopiness  He is currently taking Plavix and Atorvastatin 80 mg  He currently smokes 1 pack of cigarettes per day  Patient seen for review after recent arterial duplex  FREDO done 10/11/2018  He has history of left fem-AT bypass (2015), status post bypass graft stenosis angioplasty (2016)  Since left leg bypass he has experienced ongoing edema and numbness to the left foot  He states that this is unchanged  However, he is now experiencing bilateral calf claudication  He is unable to specify a specific distance, but states that the legs feel tight when he is walking distances or doing yard work  This tightness/pain is relieved within a few minutes with rest   Denies any rest pain symptoms  No tissue loss  He is asymptomatic from a carotid standpoint  No TIA/CVA symptoms, denies any amaurosis fugax, lateralizing weakness or speech disturbances  He has increased his smoking to 1 pack per day, prior had been down to 1/4 of a pack per day  He is taking daily aspirin, Plavix and statin therapy  The following portions of the patient's history were reviewed and updated as appropriate: allergies, current medications, past family history, past medical history, past social history, past surgical history and problem list     Review of Systems   Constitutional: Positive for activity change, appetite change and fatigue  HENT: Negative  Eyes: Negative for visual disturbance  Respiratory: Negative for cough and shortness of breath  Cardiovascular: Negative for chest pain and leg swelling  Gastrointestinal: Positive for abdominal pain, constipation and diarrhea  Endocrine:        EPI   Genitourinary: Negative  Musculoskeletal: Positive for back pain and neck pain  Skin: Negative for rash and wound  Allergic/Immunologic: Negative  Neurological: Negative for dizziness, facial asymmetry, weakness, light-headedness, numbness and headaches  Hematological: Does not bruise/bleed easily  Psychiatric/Behavioral: Negative  Objective:       Physical Exam   Constitutional: He is oriented to person, place, and time  He appears well-nourished  No distress  HENT:   Head: Normocephalic and atraumatic  Eyes: No scleral icterus  Neck: Neck supple  No JVD present  Bilateral carotid bruits   Cardiovascular: Normal rate  Pulses:       Femoral pulses are 2+ on the right side, and 2+ on the left side  Dorsalis pedis pulses are 0 on the right side, and 0 on the left side  Posterior tibial pulses are 0 on the right side, and 0 on the left side  Easily palpable bypass pulse across L thigh and lateral calf  Dopplerable R AT monophasic signal   Pulmonary/Chest: Effort normal and breath sounds normal    Abdominal: Soft  He exhibits no distension  There is no tenderness  Musculoskeletal:   Trace edema LLE   Neurological: He is alert and oriented to person, place, and time  Skin: Skin is warm and dry  Well healed bypass incisions   Psychiatric: He has a normal mood and affect           Vitals:    10/22/18 1301 10/22/18 1304   BP: 144/58 130/60   BP Location: Right arm Left arm   Patient Position: Sitting Sitting   Pulse: 55    Temp: 98 4 °F (36 9 °C)    TempSrc: Tympanic    Weight: 79 8 kg (176 lb)    Height: 5' 10" (1 778 m)        Patient Active Problem List   Diagnosis    Atherosclerosis of native arteries of extremities with intermittent claudication, bilateral legs ([facility])    PAD (peripheral artery disease) ([facility])    Stenosis of noncoronary bypass graft ([facility])    DM (diabetes mellitus) with peripheral vascular complication ([facility])    Asymptomatic bilateral carotid artery stenosis    S/P CABG (coronary artery bypass graft)    Essential hypertension    Aorto-iliac disease ([facility])    Renal artery stenosis, native, bilateral ([address] )    Dyslipidemia    Multiple vessel coronary artery disease    Progressive angina ([facility])    Tension headache    Cigarette nicotine dependence with nicotine-induced disorder       Past Surgical History:   Procedure Laterality Date    APPENDECTOMY      COLECTOMY      COLONOSCOPY      CORONARY ARTERY BYPASS GRAFT  2013    X 2    FEMORAL ARTERY - POPLITEAL ARTERY BYPASS GRAFT      HEMORRHOID SURGERY      MO Ever Ivone 3RD+ ORD SLCTV ABDL PEL/LXTR [facility] Left 8/12/2016    Procedure: LEFT FEMORAL ARTERIOGRAM; BALLOON ANGIOPLASTY; SFA  AND FEMORAL AT VEIN GRAFT;  Surgeon: Gavin Miller MD;  [facility];  Service: Vascular    TONSILLECTOMY AND ADENOIDECTOMY         Family History   Problem Relation Age of Onset    Heart attack Father     Other Sister         bypass and vlave replacement    Stroke Paternal Uncle     Arrhythmia Neg Hx     Asthma Neg Hx     Clotting disorder Neg Hx     Fainting Neg Hx     Anuerysm Neg Hx     Hypertension Neg Hx         unsure     Hyperlipidemia Neg Hx     Heart failure Neg Hx        Social History     Social History    Marital status: Single     Spouse name: N/A    Number of children: N/A    Years of education: N/A     Occupational History    Not on file  Social History Main Topics    Smoking status: Current Every Day Smoker     Packs/day: 1 00    Smokeless tobacco: Never Used    Alcohol use 1 2 - 1 8 oz/week     2 - 3 Glasses of wine per week      Comment: daily  occasional beer    Drug use: No    Sexual activity: Not on file     Other Topics Concern    Not on file     Social History Narrative    No narrative on file       No Known Allergies      Current Outpatient Prescriptions:     amLODIPine (NORVASC) 5 mg tablet, Take 10 mg by mouth daily  , Disp: , Rfl:     atorvastatin (LIPITOR) 80 mg tablet, TAKE 1 TABLET AT BEDTIME, Disp: 90 tablet, Rfl: 2    benazepril (LOTENSIN) 40 MG tablet, Take 40 mg by mouth daily  , Disp: , Rfl:     clopidogrel (PLAVIX) 75 mg tablet, TAKE 1 TABLET EVERY DAY, Disp: 90 tablet, Rfl: 3    hydrochlorothiazide (MICROZIDE) 12 5 mg capsule, Take 1 capsule (12 5 mg total) by mouth daily, Disp: 90 capsule, Rfl: 3    metFORMIN (GLUCOPHAGE) 1000 MG tablet, Take 1,000 mg by mouth 2 (two) times a day with meals  , Disp: , Rfl:     metoprolol tartrate (LOPRESSOR) 25 mg tablet, Take 25 mg by mouth 2 (two) times a day , Disp: , Rfl:     Multiple Vitamin (MULTIVITAMIN) tablet, Take 1 tablet by mouth daily  , Disp: , Rfl:     Pancrelipase, Lip-Prot-Amyl, (ZENPEP PO), Take by mouth, Disp: , Rfl:     diphenoxylate-atropine (LOMOTIL) 2 5-0 025 mg per tablet, TAKE 2 TABLETS BY MOUTH 4 TIMES DAILY, Disp: , Rfl: 0    omeprazole (PriLOSEC) 40 MG capsule, Take 40 mg by mouth daily  , Disp: , Rfl:     pantoprazole (PROTONIX) 20 mg tablet, Take 20 mg by mouth daily, Disp: , Rfl:

## 2018-10-31 ENCOUNTER — TELEPHONE (OUTPATIENT)
Dept: VASCULAR SURGERY | Facility: CLINIC | Age: 73
End: 2018-10-31

## 2018-11-01 DIAGNOSIS — I70.213 ATHEROSCLEROSIS OF NATIVE ARTERY OF BOTH LOWER EXTREMITIES WITH INTERMITTENT CLAUDICATION (HCC): Primary | ICD-10-CM

## 2018-11-01 NOTE — TELEPHONE ENCOUNTER
I spoke with pt  His Agram was originally scheduled incorrectly for 11/27/18  We had to cancel this appt and schedule correctly for Dr Shireen Melchor [facility] is now scheduled for 11/19/18 at [facility] with Dr Shireen Melchor  Pt is willing to go to this site, as it is the soonest available for Dr Shireen Melchor to do  He will go for blood work ordered  No hold on ASA or Plavix  Instructions reviewed and understood

## 2018-11-08 ENCOUNTER — APPOINTMENT (OUTPATIENT)
Dept: LAB | Facility: MEDICAL CENTER | Age: 73
End: 2018-11-08
Payer: MEDICARE

## 2018-11-08 DIAGNOSIS — I70.213 ATHEROSCLEROSIS OF NATIVE ARTERIES OF EXTREMITIES WITH INTERMITTENT CLAUDICATION, BILATERAL LEGS (HCC): ICD-10-CM

## 2018-11-08 LAB
ANION GAP SERPL CALCULATED.3IONS-SCNC: 8 MMOL/L (ref 4–13)
BUN SERPL-MCNC: 22 MG/DL (ref 5–25)
CALCIUM SERPL-MCNC: 9.3 MG/DL (ref 8.3–10.1)
CHLORIDE SERPL-SCNC: 104 MMOL/L (ref 100–108)
CO2 SERPL-SCNC: 23 MMOL/L (ref 21–32)
CREAT SERPL-MCNC: 1.3 MG/DL (ref 0.6–1.3)
ERYTHROCYTE [DISTWIDTH] IN BLOOD BY AUTOMATED COUNT: 12.5 % (ref 11.6–15.1)
GFR SERPL CREATININE-BSD FRML MDRD: 53 ML/MIN/1.73SQ M
GLUCOSE P FAST SERPL-MCNC: 101 MG/DL (ref 65–99)
HCT VFR BLD AUTO: 37.2 % (ref 36.5–49.3)
HGB BLD-MCNC: 12.3 G/DL (ref 12–17)
INR PPP: 1 (ref 0.86–1.17)
MCH RBC QN AUTO: 31.4 PG (ref 26.8–34.3)
MCHC RBC AUTO-ENTMCNC: 33.1 G/DL (ref 31.4–37.4)
MCV RBC AUTO: 95 FL (ref 82–98)
PLATELET # BLD AUTO: 224 THOUSANDS/UL (ref 149–390)
PMV BLD AUTO: 11.1 FL (ref 8.9–12.7)
POTASSIUM SERPL-SCNC: 4.7 MMOL/L (ref 3.5–5.3)
PROTHROMBIN TIME: 13.3 SECONDS (ref 11.8–14.2)
RBC # BLD AUTO: 3.92 MILLION/UL (ref 3.88–5.62)
SODIUM SERPL-SCNC: 135 MMOL/L (ref 136–145)
WBC # BLD AUTO: 8.02 THOUSAND/UL (ref 4.31–10.16)

## 2018-11-08 PROCEDURE — 36415 COLL VENOUS BLD VENIPUNCTURE: CPT | Performed by: INTERNAL MEDICINE

## 2018-11-08 PROCEDURE — 85027 COMPLETE CBC AUTOMATED: CPT

## 2018-11-08 PROCEDURE — 80048 BASIC METABOLIC PNL TOTAL CA: CPT | Performed by: INTERNAL MEDICINE

## 2018-11-08 PROCEDURE — 85610 PROTHROMBIN TIME: CPT

## 2018-11-16 ENCOUNTER — TELEPHONE (OUTPATIENT)
Dept: SURGERY | Facility: HOSPITAL | Age: 73
End: 2018-11-16

## 2018-11-16 ENCOUNTER — TELEPHONE (OUTPATIENT)
Dept: INTERVENTIONAL RADIOLOGY/VASCULAR | Facility: HOSPITAL | Age: 73
End: 2018-11-16

## 2018-11-19 ENCOUNTER — HOSPITAL ENCOUNTER (OUTPATIENT)
Dept: INTERVENTIONAL RADIOLOGY/VASCULAR | Facility: HOSPITAL | Age: 73
Discharge: HOME | End: 2018-11-19
Attending: SURGERY
Payer: MEDICARE

## 2018-11-19 VITALS
TEMPERATURE: 97.8 F | WEIGHT: 174.6 LBS | OXYGEN SATURATION: 96 % | RESPIRATION RATE: 18 BRPM | DIASTOLIC BLOOD PRESSURE: 75 MMHG | HEIGHT: 71 IN | HEART RATE: 52 BPM | SYSTOLIC BLOOD PRESSURE: 175 MMHG | BODY MASS INDEX: 24.44 KG/M2

## 2018-11-19 DIAGNOSIS — I70.213 ATHEROSCLEROSIS OF NATIVE ARTERY OF BOTH LOWER EXTREMITIES WITH INTERMITTENT CLAUDICATION (HCC): ICD-10-CM

## 2018-11-19 LAB — GLUCOSE SERPL-MCNC: 108 MG/DL (ref 65–140)

## 2018-11-19 PROCEDURE — C1725 CATH, TRANSLUMIN NON-LASER: HCPCS

## 2018-11-19 PROCEDURE — 37228 PR REVASCULARIZE TIBIAL/PERON ARTERY,ANGIOPLASTY INITIAL: CPT | Performed by: SURGERY

## 2018-11-19 PROCEDURE — C1769 GUIDE WIRE: HCPCS

## 2018-11-19 PROCEDURE — C1760 CLOSURE DEV, VASC: HCPCS

## 2018-11-19 PROCEDURE — 99152 MOD SED SAME PHYS/QHP 5/>YRS: CPT

## 2018-11-19 PROCEDURE — C2623 CATH, TRANSLUMIN, DRUG-COAT: HCPCS

## 2018-11-19 PROCEDURE — 37224 PR REVSC OPN/PRG FEM/POP W/ANGIOPLASTY UNI: CPT | Performed by: SURGERY

## 2018-11-19 PROCEDURE — 99153 MOD SED SAME PHYS/QHP EA: CPT

## 2018-11-19 PROCEDURE — 76937 US GUIDE VASCULAR ACCESS: CPT

## 2018-11-19 PROCEDURE — 75716 ARTERY X-RAYS ARMS/LEGS: CPT

## 2018-11-19 PROCEDURE — C1887 CATHETER, GUIDING: HCPCS

## 2018-11-19 PROCEDURE — 75716 ARTERY X-RAYS ARMS/LEGS: CPT | Performed by: SURGERY

## 2018-11-19 PROCEDURE — 37228 HB TIB/PER REVASC W/TLA: CPT

## 2018-11-19 PROCEDURE — 82948 REAGENT STRIP/BLOOD GLUCOSE: CPT

## 2018-11-19 PROCEDURE — 99152 MOD SED SAME PHYS/QHP 5/>YRS: CPT | Performed by: SURGERY

## 2018-11-19 PROCEDURE — 37224 HB FEM/POPL REVAS W/TLA: CPT

## 2018-11-19 PROCEDURE — C1894 INTRO/SHEATH, NON-LASER: HCPCS

## 2018-11-19 RX ORDER — ASPIRIN 81 MG/1
81 TABLET ORAL DAILY
COMMUNITY

## 2018-11-19 RX ORDER — SODIUM CHLORIDE 9 MG/ML
125 INJECTION, SOLUTION INTRAVENOUS CONTINUOUS
Status: DISPENSED | OUTPATIENT
Start: 2018-11-19 | End: 2018-11-19

## 2018-11-19 RX ORDER — MIDAZOLAM HYDROCHLORIDE 1 MG/ML
INJECTION INTRAMUSCULAR; INTRAVENOUS CODE/TRAUMA/SEDATION MEDICATION
Status: DISCONTINUED | OUTPATIENT
Start: 2018-11-19 | End: 2018-11-23 | Stop reason: HOSPADM

## 2018-11-19 RX ORDER — LIDOCAINE HYDROCHLORIDE 10 MG/ML
INJECTION, SOLUTION INFILTRATION; PERINEURAL CODE/TRAUMA/SEDATION MEDICATION
Status: DISCONTINUED | OUTPATIENT
Start: 2018-11-19 | End: 2018-11-23 | Stop reason: HOSPADM

## 2018-11-19 RX ORDER — FENTANYL CITRATE 50 UG/ML
INJECTION, SOLUTION INTRAMUSCULAR; INTRAVENOUS CODE/TRAUMA/SEDATION MEDICATION
Status: DISCONTINUED | OUTPATIENT
Start: 2018-11-19 | End: 2018-11-23 | Stop reason: HOSPADM

## 2018-11-19 RX ORDER — HEPARIN SODIUM 1000 [USP'U]/ML
INJECTION, SOLUTION INTRAVENOUS; SUBCUTANEOUS CODE/TRAUMA/SEDATION MEDICATION
Status: DISCONTINUED | OUTPATIENT
Start: 2018-11-19 | End: 2018-11-23 | Stop reason: HOSPADM

## 2018-11-19 RX ADMIN — MIDAZOLAM HYDROCHLORIDE 1 MG: 1 INJECTION, SOLUTION INTRAMUSCULAR; INTRAVENOUS at 14:23

## 2018-11-19 RX ADMIN — MIDAZOLAM HYDROCHLORIDE 1 MG: 1 INJECTION, SOLUTION INTRAMUSCULAR; INTRAVENOUS at 15:06

## 2018-11-19 RX ADMIN — MIDAZOLAM HYDROCHLORIDE 1 MG: 1 INJECTION, SOLUTION INTRAMUSCULAR; INTRAVENOUS at 13:45

## 2018-11-19 RX ADMIN — HEPARIN SODIUM 5000 UNITS: 1000 INJECTION INTRAVENOUS; SUBCUTANEOUS at 14:23

## 2018-11-19 RX ADMIN — FENTANYL CITRATE 50 MCG: 50 INJECTION, SOLUTION INTRAMUSCULAR; INTRAVENOUS at 15:06

## 2018-11-19 RX ADMIN — FENTANYL CITRATE 50 MCG: 50 INJECTION, SOLUTION INTRAMUSCULAR; INTRAVENOUS at 13:45

## 2018-11-19 RX ADMIN — LIDOCAINE HYDROCHLORIDE 5 ML: 10 INJECTION, SOLUTION INFILTRATION; PERINEURAL at 13:44

## 2018-11-19 RX ADMIN — FENTANYL CITRATE 50 MCG: 50 INJECTION, SOLUTION INTRAMUSCULAR; INTRAVENOUS at 13:31

## 2018-11-19 RX ADMIN — IODIXANOL 60 ML: 320 INJECTION, SOLUTION INTRAVASCULAR at 16:48

## 2018-11-19 RX ADMIN — FENTANYL CITRATE 50 MCG: 50 INJECTION, SOLUTION INTRAMUSCULAR; INTRAVENOUS at 14:23

## 2018-11-19 RX ADMIN — MIDAZOLAM HYDROCHLORIDE 1 MG: 1 INJECTION, SOLUTION INTRAMUSCULAR; INTRAVENOUS at 13:31

## 2018-11-19 RX ADMIN — HEPARIN SODIUM 2000 UNITS: 1000 INJECTION INTRAVENOUS; SUBCUTANEOUS at 14:44

## 2018-11-19 NOTE — DISCHARGE INSTRUCTIONS
DISCHARGE INSTRUCTIONS  ARTERIOGRAM/ANGIOPLASTY/STENT    Following discharge from the hospital, you may have some questions about your procedure, your activities or your general condition  These instructions may answer some of your questions and help you adjust during the first few weeks following your operation  ACTIVITY: The evening following the procedure you should be sure someone remains with you until the next morning  Rest as much as possible, sitting, lying or reclining  Use the stairs as little as possible  The following day, limit your activity to walking  Avoid stooping or heavy lifting (no more than 30 lbs) for the first three days  Walking up steps and normal activities may be resumed as you feel ready  You should not drive a car for at least two days following discharge from the hospital  You may ride in a car  If you have any questions regarding a particular activity, please discuss with your doctor or nurse before you are discharged  DIET:  Resume your normal diet  Try to eat low fat and low cholesterol foods  Drink more liquids than usual for the next 24 hours  INCISION: Your doctor may have chosen to use a type of adhesive glue, to close your incision  The glue is used to cover the incision, assist in closure, and prevent contamination  This adhesive will darken and peel away on its own within one to two weeks  You may shower after the procedure, but do not scrub the incision  Sitting in a tub is not recommended for the two days following the procedure or if you have any open wounds  It is normal to have some bruising, swelling or mild discoloration around the incision  IF increasing redness or pain develops, call our office immediately  If present, you may remove the band-aid or steri-strips over your wound after two days  If you notice any active bleeding at the site, apply pressure to the site and call our office ([phone])      FOLLOW UP STUDIES:  Your doctor will discuss whether further treatments or follow-up studies are necessary at your first post procedure visit  MEDICATIONS:  Do Not Take Metformin for 48 hrs after the angiogram procedure  PLEASE CALL THE OFFICE IF YOU HAVE ANY QUESTIONS  [phone] [facility] FREE [phone]  [address] [facility] , [address], [place], [address]  [address], [facility], [address]  [address], [facility], [address]  [address] [facility] [address], [address] [facility], [facility], [address]    [address], [address]  [address] [facility], [facility] [address]   [address] [facility], [address] [facility], [address] [facility]  [address] [facility], [address] [facility], Kimberly Levy

## 2018-11-19 NOTE — BRIEF OP NOTE (RAD/CATH)
BRIEF PROCEDURE REPORT  PATIENT NAME: Haley Bains      :  1945  MRN: [id number]  Pt Location:  [facility]    Procedure date - 18    Surgeon - Harriet Henley MD, [facility]    Preop Diagnosis:  Critical right lower extremity ischemia with short distance claudication and numbness    Stenosis of left femoral to anterior tibial artery bypass    Procedure -  US guided left common femoral artery access  Bilateral lower extremity angiogram  Right popliteal artery angioplasty using 3mm, 4mm and 5mm PTA balloons  Right Anterior tibial artery angioplasty using 2mm balloon    Estimated Blood Loss:   Minimal    Anesthesia Type:   conscious sedation    Complications:   None    Patient Disposition:  APU    SIGNATURE: Harriet Henley MD  DATE: 2018  TIME: 4:19 PM

## 2018-11-19 NOTE — H&P (VIEW-ONLY)
Assessment/Plan:    75 y/o M with HTN, HLD, DM II, CAD s/p CABG, carotid stenosis, and PAD s/p L fem-AT bypass 11/2015 (Dr Nasim Gonzáles), s/p angioplasty to multiple areas bypass graft stenosis 8/2016, seen for review after recent arterial duplex  Atherosclerosis of native arteries of extremities with intermittent claudication, bilateral legs (HCC)  -LEAD (10/11/18):  R popliteal occlusion, distal PT occlusion, R SAMEER 0 35/43/21; L fem-AT bypass patent with worsening distal graft stenosis >75% and 50-75% distal anastomosis stenosis, L SAMEER 0 80/72/43  -Experiencing intermittent calf claudication  Denies rest pain  -Remains with preserved SAMEER on the L and easily palpable graft pulse  -Due to significant decrease in L SAMEER 0 35 and new claudication symptoms will plan to proceed with angiogram  -We discussed the risks vs benefits of angiogram and he would like to proceed  -Will plan for bilateral lower extremity angiogram with possible treatment of the RLE and diagnostic imaging of bypass graft on left  -Continue aspirin and Plavix, do not hold for Agram  Continue atorvastatin      Asymptomatic bilateral carotid artery stenosis  -asymptomatic bilateral carotid stenosis  -CV duplex (8/14/18): Bilateral >70% ICA stenosis with unchanged velocities, as compared to prior duplex and CTA neck done in August 2017  -continue medical management with aspirin and statin therapy  -will continue routine monitoring with CV duplex every 6 months  -reviewed signs/symptoms of stroke    Instructed to go to nearest ER with any stroke symptoms    Dyslipidemia  -continue Atorvastatin    Cigarette nicotine dependence with nicotine-induced disorder  -recent increase in smoking to 1 ppd from 1/4 ppd  -we discussed the benefits of smoking cessation in relation to progression of arterial disease  -he has no desire to attempt cessation at this time    Operative Scheduling Information:    Hospital:  [facility]    Physician:  Ese Wayne    Surgery: Abdominal aortogram with bilateral lower extremity runoff possible RLE intervention/PTA/stenting, diagnostic imaging of LLE bypass     Urgency:  Standard    Case Length:  Normal    Post-op Bed:  Outpatient    OR Table:  IR    Equipment Needs:  None    Medication Instructions:  Aspirin:   Continue (do not hold)  Plavix:  Continue (do not hold)    Hydration: To be determined based off results of BMP     Diagnoses and all orders for this visit:    Atherosclerosis of native arteries of extremities with intermittent claudication, bilateral legs ([address] )  -     Basic metabolic panel; Future  -     CBC and Platelet; Future  -     Protime-INR; Future    Dyslipidemia    Cigarette nicotine dependence with nicotine-induced disorder    Other orders  -     diphenoxylate-atropine (LOMOTIL) 2 5-0 025 mg per tablet; TAKE 2 TABLETS BY MOUTH 4 TIMES DAILY  -     Notify Physician in PT/INR greater than 1 8 and/or Creatine Clearance (gFR)  is less than 60  ml/clarita/1 73sq m; Standing  -     Height and Weight Upon Arrival; Standing  -     Nursing communcation Apply snapless gown prior to procedure; Standing  -     Have Patient Void On Call to Procedure Room; Standing  -     Insert and Maintain IV; Standing  -     IR lower extremity / intervention; Standing  -     Diet NPO; Sips with meds; Standing  -     Nursing communcation If patient on diabetic medications, nurse to notify physician to ensure the medications are adjusted preprocedure ; Standing  -     IR lower extremity / intervention          Subjective: " I am here to review by test results  Patient ID: Emiliano Donaldson  is a 74 y o  male  Patient FREDO done 10/11/2018 and he also had a CV done on 8/14/2018  Pt is having fatigue while walking in both legs  He states that he can walk at least 2 blocks before he starts with tightness in his both legs  He states that rest helps relieve discomfort  The tightness is in both of his calves  Pt is having no TIA or stroke symptoms   His face has no droopiness  He is currently taking Plavix and Atorvastatin 80 mg  He currently smokes 1 pack of cigarettes per day  Patient seen for review after recent arterial duplex  FREDO done 10/11/2018  He has history of left fem-AT bypass (2015), status post bypass graft stenosis angioplasty (2016)  Since left leg bypass he has experienced ongoing edema and numbness to the left foot  He states that this is unchanged  However, he is now experiencing bilateral calf claudication  He is unable to specify a specific distance, but states that the legs feel tight when he is walking distances or doing yard work  This tightness/pain is relieved within a few minutes with rest   Denies any rest pain symptoms  No tissue loss  He is asymptomatic from a carotid standpoint  No TIA/CVA symptoms, denies any amaurosis fugax, lateralizing weakness or speech disturbances  He has increased his smoking to 1 pack per day, prior had been down to 1/4 of a pack per day  He is taking daily aspirin, Plavix and statin therapy  The following portions of the patient's history were reviewed and updated as appropriate: allergies, current medications, past family history, past medical history, past social history, past surgical history and problem list     Review of Systems   Constitutional: Positive for activity change, appetite change and fatigue  HENT: Negative  Eyes: Negative for visual disturbance  Respiratory: Negative for cough and shortness of breath  Cardiovascular: Negative for chest pain and leg swelling  Gastrointestinal: Positive for abdominal pain, constipation and diarrhea  Endocrine:        EPI   Genitourinary: Negative  Musculoskeletal: Positive for back pain and neck pain  Skin: Negative for rash and wound  Allergic/Immunologic: Negative  Neurological: Negative for dizziness, facial asymmetry, weakness, light-headedness, numbness and headaches  Hematological: Does not bruise/bleed easily  Psychiatric/Behavioral: Negative  Objective:       Physical Exam   Constitutional: He is oriented to person, place, and time  He appears well-nourished  No distress  HENT:   Head: Normocephalic and atraumatic  Eyes: No scleral icterus  Neck: Neck supple  No JVD present  Bilateral carotid bruits   Cardiovascular: Normal rate  Pulses:       Femoral pulses are 2+ on the right side, and 2+ on the left side  Dorsalis pedis pulses are 0 on the right side, and 0 on the left side  Posterior tibial pulses are 0 on the right side, and 0 on the left side  Easily palpable bypass pulse across L thigh and lateral calf  Dopplerable R AT monophasic signal   Pulmonary/Chest: Effort normal and breath sounds normal    Abdominal: Soft  He exhibits no distension  There is no tenderness  Musculoskeletal:   Trace edema LLE   Neurological: He is alert and oriented to person, place, and time  Skin: Skin is warm and dry  Well healed bypass incisions   Psychiatric: He has a normal mood and affect           Vitals:    10/22/18 1301 10/22/18 1304   BP: 144/58 130/60   BP Location: Right arm Left arm   Patient Position: Sitting Sitting   Pulse: 55    Temp: 98 4 °F (36 9 °C)    TempSrc: Tympanic    Weight: 79 8 kg (176 lb)    Height: 5' 10" (1 778 m)        Patient Active Problem List   Diagnosis    Atherosclerosis of native arteries of extremities with intermittent claudication, bilateral legs [facility]    PAD (peripheral artery disease) ([facility]    Stenosis of noncoronary bypass graft ([facility]    DM (diabetes mellitus) with peripheral vascular complication ([facility])    Asymptomatic bilateral carotid artery stenosis    S/P CABG (coronary artery bypass graft)    Essential hypertension    Aorto-iliac disease ([facility])    Renal artery stenosis, native, bilateral ([address] )    Dyslipidemia    Multiple vessel coronary artery disease    Progressive angina ([facility])    Tension headache    Cigarette nicotine dependence with nicotine-induced disorder       Past Surgical History:   Procedure Laterality Date    APPENDECTOMY      COLECTOMY      COLONOSCOPY      CORONARY ARTERY BYPASS GRAFT  2013    X 2    FEMORAL ARTERY - POPLITEAL ARTERY BYPASS GRAFT      HEMORRHOID SURGERY      [place] Dates 3RD+ ORD SLCTV ABDL PEL/LXTR [facility] Left 8/12/2016    Procedure: LEFT FEMORAL ARTERIOGRAM; BALLOON ANGIOPLASTY; SFA  AND FEMORAL AT VEIN GRAFT;  Surgeon: Ximena Aleman MD;  Location: [facility];  Service: Vascular    TONSILLECTOMY AND ADENOIDECTOMY         Family History   Problem Relation Age of Onset    Heart attack Father     Other Sister         bypass and vlave replacement    Stroke Paternal Uncle     Arrhythmia Neg Hx     Asthma Neg Hx     Clotting disorder Neg Hx     Fainting Neg Hx     Anuerysm Neg Hx     Hypertension Neg Hx         unsure     Hyperlipidemia Neg Hx     Heart failure Neg Hx        Social History     Social History    Marital status: Single     Spouse name: N/A    Number of children: N/A    Years of education: N/A     Occupational History    Not on file  Social History Main Topics    Smoking status: Current Every Day Smoker     Packs/day: 1 00    Smokeless tobacco: Never Used    Alcohol use 1 2 - 1 8 oz/week     2 - 3 Glasses of wine per week      Comment: daily  occasional beer    Drug use: No    Sexual activity: Not on file     Other Topics Concern    Not on file     Social History Narrative    No narrative on file       No Known Allergies      Current Outpatient Prescriptions:     amLODIPine (NORVASC) 5 mg tablet, Take 10 mg by mouth daily  , Disp: , Rfl:     atorvastatin (LIPITOR) 80 mg tablet, TAKE 1 TABLET AT BEDTIME, Disp: 90 tablet, Rfl: 2    benazepril (LOTENSIN) 40 MG tablet, Take 40 mg by mouth daily  , Disp: , Rfl:     clopidogrel (PLAVIX) 75 mg tablet, TAKE 1 TABLET EVERY DAY, Disp: 90 tablet, Rfl: 3    hydrochlorothiazide (MICROZIDE) 12 5 mg capsule, Take 1 capsule (12 5 mg total) by mouth daily, Disp: 90 capsule, Rfl: 3    metFORMIN (GLUCOPHAGE) 1000 MG tablet, Take 1,000 mg by mouth 2 (two) times a day with meals  , Disp: , Rfl:     metoprolol tartrate (LOPRESSOR) 25 mg tablet, Take 25 mg by mouth 2 (two) times a day , Disp: , Rfl:     Multiple Vitamin (MULTIVITAMIN) tablet, Take 1 tablet by mouth daily  , Disp: , Rfl:     Pancrelipase, Lip-Prot-Amyl, (ZENPEP PO), Take by mouth, Disp: , Rfl:     diphenoxylate-atropine (LOMOTIL) 2 5-0 025 mg per tablet, TAKE 2 TABLETS BY MOUTH 4 TIMES DAILY, Disp: , Rfl: 0    omeprazole (PriLOSEC) 40 MG capsule, Take 40 mg by mouth daily  , Disp: , Rfl:     pantoprazole (PROTONIX) 20 mg tablet, Take 20 mg by mouth daily, Disp: , Rfl:

## 2018-12-14 ENCOUNTER — TRANSCRIBE ORDERS (OUTPATIENT)
Dept: ADMINISTRATIVE | Facility: HOSPITAL | Age: 73
End: 2018-12-14

## 2018-12-14 ENCOUNTER — LAB (OUTPATIENT)
Dept: LAB | Facility: MEDICAL CENTER | Age: 73
End: 2018-12-14
Payer: MEDICARE

## 2018-12-14 DIAGNOSIS — Z12.5 ENCOUNTER FOR SCREENING FOR MALIGNANT NEOPLASM OF PROSTATE: ICD-10-CM

## 2018-12-14 DIAGNOSIS — E11.9 TYPE 2 DIABETES MELLITUS WITHOUT COMPLICATION, WITH LONG-TERM CURRENT USE OF INSULIN (HCC): Primary | ICD-10-CM

## 2018-12-14 DIAGNOSIS — Z79.4 TYPE 2 DIABETES MELLITUS WITHOUT COMPLICATION, WITH LONG-TERM CURRENT USE OF INSULIN (HCC): Primary | ICD-10-CM

## 2018-12-14 PROCEDURE — 36415 COLL VENOUS BLD VENIPUNCTURE: CPT | Performed by: FAMILY MEDICINE

## 2018-12-14 PROCEDURE — G0103 PSA SCREENING: HCPCS | Performed by: FAMILY MEDICINE

## 2018-12-14 PROCEDURE — 83036 HEMOGLOBIN GLYCOSYLATED A1C: CPT | Performed by: FAMILY MEDICINE

## 2018-12-15 LAB
EST. AVERAGE GLUCOSE BLD GHB EST-MCNC: 154 MG/DL
HBA1C MFR BLD: 7 % (ref 4.2–6.3)
PSA SERPL-MCNC: 1.7 NG/ML (ref 0–4)

## 2018-12-19 ENCOUNTER — OFFICE VISIT (OUTPATIENT)
Dept: VASCULAR SURGERY | Facility: CLINIC | Age: 73
End: 2018-12-19
Payer: MEDICARE

## 2018-12-19 VITALS
BODY MASS INDEX: 25.48 KG/M2 | WEIGHT: 182 LBS | SYSTOLIC BLOOD PRESSURE: 128 MMHG | HEIGHT: 71 IN | RESPIRATION RATE: 18 BRPM | DIASTOLIC BLOOD PRESSURE: 68 MMHG | TEMPERATURE: 97 F | HEART RATE: 58 BPM

## 2018-12-19 DIAGNOSIS — I65.23 ASYMPTOMATIC BILATERAL CAROTID ARTERY STENOSIS: Chronic | ICD-10-CM

## 2018-12-19 DIAGNOSIS — I70.213 ATHEROSCLEROSIS OF NATIVE ARTERIES OF EXTREMITIES WITH INTERMITTENT CLAUDICATION, BILATERAL LEGS (HCC): Primary | ICD-10-CM

## 2018-12-19 PROCEDURE — 99213 OFFICE O/P EST LOW 20 MIN: CPT | Performed by: SURGERY

## 2018-12-19 NOTE — ASSESSMENT & PLAN NOTE
After right leg intervention his claudication has resolved  Long segment popliteal occlusion, may recurr especially if he is smoking  If recurrence of stenosis may need repeat intervention with atherectomy  3 month duplex  Continue ASA and Plavix

## 2018-12-19 NOTE — ASSESSMENT & PLAN NOTE
Continue with 6 monthly duplex surveillance  Due to severe calcific atherosclerosis that is diffuse he is high risk candidate for any intervention at this time

## 2018-12-19 NOTE — PROGRESS NOTES
Assessment/Plan:    Asymptomatic bilateral carotid artery stenosis  Continue with 6 monthly duplex surveillance  Due to severe calcific atherosclerosis that is diffuse he is high risk candidate for any intervention at this time  Atherosclerosis of native arteries of extremities with intermittent claudication, bilateral legs (HCC)  After right leg intervention his claudication has resolved  Long segment popliteal occlusion, may recurr especially if he is smoking  If recurrence of stenosis may need repeat intervention with atherectomy  3 month duplex  Continue ASA and Plavix  Diagnoses and all orders for this visit:    Atherosclerosis of native arteries of extremities with intermittent claudication, bilateral legs (HCC)  -     VAS lower limb arterial duplex, complete bilateral; Future    Asymptomatic bilateral carotid artery stenosis          Subjective:      Patient ID: Moriah Cabello  is a 74 y o  male  Patient underwent angiogram with bilateral lower extremities on 1119 2018 as he was having right leg claudication  His right leg claudication has since resolved  His hematocrit he has also resolved  He complains of some numbness and tingling in the foot since his leg bypass surgery  Patient takes aspirin Plavix and Lipitor daily basis  He smokes one pack of cigarettes a day  Complains of a small lump in his left groin and the access site that is improved over time  Denies any  symptoms of stroke or TIA  The following portions of the patient's history were reviewed and updated as appropriate: allergies, current medications, past family history, past medical history, past social history, past surgical history and problem list     Review of Systems   Constitutional: Negative  HENT: Negative  Eyes: Negative  Respiratory: Negative  Cardiovascular: Positive for leg swelling (groin)  Gastrointestinal: Negative  Endocrine: Negative  Genitourinary: Negative  Musculoskeletal: Negative  Allergic/Immunologic: Negative  Neurological: Positive for numbness  Hematological: Negative  Psychiatric/Behavioral: Negative  I have reviewed the ROS as entered  Objective:      /68 (BP Location: Right arm)   Pulse 58   Temp (!) 97 °F (36 1 °C)   Resp 18   Ht 5' 11" (1 803 m)   Wt 82 6 kg (182 lb)   BMI 25 38 kg/m²          Physical Exam   Constitutional: He is oriented to person, place, and time  He appears well-developed and well-nourished  No distress  HENT:   Head: Normocephalic and atraumatic  Cardiovascular: Normal rate  Exam reveals no gallop and no friction rub  No murmur heard  Triphasic AT bilaterally  Pulmonary/Chest: Effort normal    Musculoskeletal: Normal range of motion  He exhibits no edema  Neurological: He is alert and oriented to person, place, and time  Skin: Skin is warm and dry  He is not diaphoretic  No erythema  Psychiatric: He has a normal mood and affect  His behavior is normal    Nursing note and vitals reviewed

## 2019-02-12 ENCOUNTER — TRANSCRIBE ORDERS (OUTPATIENT)
Dept: ADMINISTRATIVE | Facility: HOSPITAL | Age: 74
End: 2019-02-12

## 2019-02-12 DIAGNOSIS — R59.0 SUBMANDIBULAR LYMPHADENOPATHY: ICD-10-CM

## 2019-02-12 DIAGNOSIS — D37.032: Primary | ICD-10-CM

## 2019-02-14 ENCOUNTER — HOSPITAL ENCOUNTER (OUTPATIENT)
Dept: RADIOLOGY | Facility: MEDICAL CENTER | Age: 74
Discharge: HOME | End: 2019-02-14
Payer: MEDICARE

## 2019-02-14 DIAGNOSIS — D37.032: ICD-10-CM

## 2019-02-14 PROCEDURE — 76536 US EXAM OF HEAD AND NECK: CPT

## 2019-03-01 ENCOUNTER — TRANSCRIBE ORDERS (OUTPATIENT)
Dept: ADMINISTRATIVE | Facility: HOSPITAL | Age: 74
End: 2019-03-01

## 2019-03-01 ENCOUNTER — APPOINTMENT (OUTPATIENT)
Dept: LAB | Facility: MEDICAL CENTER | Age: 74
End: 2019-03-01
Payer: MEDICARE

## 2019-03-01 DIAGNOSIS — K22.70 BARRETT'S ESOPHAGUS WITHOUT DYSPLASIA: Primary | ICD-10-CM

## 2019-03-01 DIAGNOSIS — E16.9: ICD-10-CM

## 2019-03-01 DIAGNOSIS — K22.70 BARRETT'S ESOPHAGUS WITHOUT DYSPLASIA: ICD-10-CM

## 2019-03-01 DIAGNOSIS — R19.7 DIARRHEA, UNSPECIFIED TYPE: ICD-10-CM

## 2019-03-01 LAB
AMYLASE SERPL-CCNC: 33 IU/L (ref 25–115)
BUN SERPL-MCNC: 21 MG/DL (ref 5–25)
CREAT SERPL-MCNC: 1.44 MG/DL (ref 0.6–1.3)
GFR SERPL CREATININE-BSD FRML MDRD: 47 ML/MIN/1.73SQ M
LIPASE SERPL-CCNC: 52 U/L (ref 73–393)

## 2019-03-01 PROCEDURE — 82656 EL-1 FECAL QUAL/SEMIQ: CPT

## 2019-03-01 PROCEDURE — 84520 ASSAY OF UREA NITROGEN: CPT | Performed by: INTERNAL MEDICINE

## 2019-03-01 PROCEDURE — 36415 COLL VENOUS BLD VENIPUNCTURE: CPT

## 2019-03-01 PROCEDURE — 82150 ASSAY OF AMYLASE: CPT

## 2019-03-01 PROCEDURE — 82565 ASSAY OF CREATININE: CPT | Performed by: INTERNAL MEDICINE

## 2019-03-01 PROCEDURE — 83690 ASSAY OF LIPASE: CPT

## 2019-03-04 ENCOUNTER — HOSPITAL ENCOUNTER (OUTPATIENT)
Dept: RADIOLOGY | Facility: MEDICAL CENTER | Age: 74
Discharge: HOME | End: 2019-03-04
Payer: MEDICARE

## 2019-03-04 DIAGNOSIS — I70.213 ATHEROSCLEROSIS OF NATIVE ARTERIES OF EXTREMITIES WITH INTERMITTENT CLAUDICATION, BILATERAL LEGS (HCC): ICD-10-CM

## 2019-03-04 DIAGNOSIS — I65.23 BILATERAL CAROTID ARTERY STENOSIS: ICD-10-CM

## 2019-03-04 DIAGNOSIS — R59.0 SUBMANDIBULAR LYMPHADENOPATHY: ICD-10-CM

## 2019-03-04 PROCEDURE — 70491 CT SOFT TISSUE NECK W/DYE: CPT

## 2019-03-04 PROCEDURE — 93880 EXTRACRANIAL BILAT STUDY: CPT

## 2019-03-04 PROCEDURE — 93925 LOWER EXTREMITY STUDY: CPT

## 2019-03-04 PROCEDURE — 93923 UPR/LXTR ART STDY 3+ LVLS: CPT

## 2019-03-04 RX ADMIN — IODIXANOL 85 ML: 320 INJECTION, SOLUTION INTRAVASCULAR at 13:53

## 2019-03-05 LAB — ELASTASE PANC STL-MCNT: 141 UG ELAST./G

## 2019-03-05 PROCEDURE — 93880 EXTRACRANIAL BILAT STUDY: CPT | Performed by: SURGERY

## 2019-03-06 PROCEDURE — 93925 LOWER EXTREMITY STUDY: CPT | Performed by: SURGERY

## 2019-03-06 PROCEDURE — 93922 UPR/L XTREMITY ART 2 LEVELS: CPT | Performed by: SURGERY

## 2019-04-02 DIAGNOSIS — I25.10 CORONARY ARTERY DISEASE INVOLVING NATIVE CORONARY ARTERY OF NATIVE HEART WITHOUT ANGINA PECTORIS: ICD-10-CM

## 2019-04-03 RX ORDER — HYDROCHLOROTHIAZIDE 12.5 MG/1
12.5 CAPSULE, GELATIN COATED ORAL DAILY
Qty: 90 CAPSULE | Refills: 3 | Status: SHIPPED | OUTPATIENT
Start: 2019-04-03 | End: 2020-01-20 | Stop reason: SDUPTHER

## 2019-04-04 ENCOUNTER — OFFICE VISIT (OUTPATIENT)
Dept: CARDIOLOGY CLINIC | Facility: MEDICAL CENTER | Age: 74
End: 2019-04-04
Payer: MEDICARE

## 2019-04-04 VITALS
DIASTOLIC BLOOD PRESSURE: 50 MMHG | HEART RATE: 47 BPM | SYSTOLIC BLOOD PRESSURE: 140 MMHG | WEIGHT: 182.3 LBS | OXYGEN SATURATION: 98 % | BODY MASS INDEX: 25.43 KG/M2

## 2019-04-04 DIAGNOSIS — I25.10 MULTIPLE VESSEL CORONARY ARTERY DISEASE: ICD-10-CM

## 2019-04-04 DIAGNOSIS — Z95.1 S/P CABG (CORONARY ARTERY BYPASS GRAFT): Chronic | ICD-10-CM

## 2019-04-04 DIAGNOSIS — I73.9 PAD (PERIPHERAL ARTERY DISEASE) (HCC): ICD-10-CM

## 2019-04-04 DIAGNOSIS — I25.10 CORONARY ARTERY DISEASE INVOLVING NATIVE CORONARY ARTERY OF NATIVE HEART WITHOUT ANGINA PECTORIS: Primary | ICD-10-CM

## 2019-04-04 DIAGNOSIS — I65.23 ASYMPTOMATIC BILATERAL CAROTID ARTERY STENOSIS: Chronic | ICD-10-CM

## 2019-04-04 DIAGNOSIS — I10 ESSENTIAL HYPERTENSION: ICD-10-CM

## 2019-04-04 PROCEDURE — 93000 ELECTROCARDIOGRAM COMPLETE: CPT | Performed by: INTERNAL MEDICINE

## 2019-04-04 PROCEDURE — 99214 OFFICE O/P EST MOD 30 MIN: CPT | Performed by: INTERNAL MEDICINE

## 2019-05-09 ENCOUNTER — HOSPITAL ENCOUNTER (OUTPATIENT)
Dept: NON INVASIVE DIAGNOSTICS | Facility: MEDICAL CENTER | Age: 74
Discharge: HOME | End: 2019-05-09
Payer: MEDICARE

## 2019-05-09 DIAGNOSIS — I25.10 CORONARY ARTERY DISEASE INVOLVING NATIVE CORONARY ARTERY OF NATIVE HEART WITHOUT ANGINA PECTORIS: ICD-10-CM

## 2019-05-09 PROCEDURE — 93306 TTE W/DOPPLER COMPLETE: CPT | Performed by: INTERNAL MEDICINE

## 2019-05-09 PROCEDURE — 93306 TTE W/DOPPLER COMPLETE: CPT

## 2019-05-24 ENCOUNTER — TELEPHONE (OUTPATIENT)
Dept: CARDIOLOGY CLINIC | Facility: CLINIC | Age: 74
End: 2019-05-24

## 2019-05-28 ENCOUNTER — LAB (OUTPATIENT)
Dept: LAB | Facility: MEDICAL CENTER | Age: 74
End: 2019-05-28
Payer: MEDICARE

## 2019-05-28 ENCOUNTER — TELEPHONE (OUTPATIENT)
Dept: CARDIOLOGY CLINIC | Facility: CLINIC | Age: 74
End: 2019-05-28

## 2019-05-28 DIAGNOSIS — I25.10 CORONARY ARTERY DISEASE INVOLVING NATIVE CORONARY ARTERY OF NATIVE HEART WITHOUT ANGINA PECTORIS: ICD-10-CM

## 2019-05-28 LAB
ALBUMIN SERPL BCP-MCNC: 3.7 G/DL (ref 3.5–5)
ALP SERPL-CCNC: 44 U/L (ref 46–116)
ALT SERPL W P-5'-P-CCNC: 18 U/L (ref 12–78)
ANION GAP SERPL CALCULATED.3IONS-SCNC: 9 MMOL/L (ref 4–13)
AST SERPL W P-5'-P-CCNC: 14 U/L (ref 5–45)
BILIRUB SERPL-MCNC: 0.48 MG/DL (ref 0.2–1)
BUN SERPL-MCNC: 29 MG/DL (ref 5–25)
CALCIUM SERPL-MCNC: 8.4 MG/DL (ref 8.3–10.1)
CHLORIDE SERPL-SCNC: 109 MMOL/L (ref 100–108)
CHOLEST SERPL-MCNC: 81 MG/DL (ref 50–200)
CO2 SERPL-SCNC: 24 MMOL/L (ref 21–32)
CREAT SERPL-MCNC: 1.57 MG/DL (ref 0.6–1.3)
GFR SERPL CREATININE-BSD FRML MDRD: 42 ML/MIN/1.73SQ M
GLUCOSE P FAST SERPL-MCNC: 126 MG/DL (ref 65–99)
HDLC SERPL-MCNC: 34 MG/DL (ref 40–60)
LDLC SERPL CALC-MCNC: 29 MG/DL (ref 0–100)
POTASSIUM SERPL-SCNC: 4.2 MMOL/L (ref 3.5–5.3)
PROT SERPL-MCNC: 6.7 G/DL (ref 6.4–8.2)
SODIUM SERPL-SCNC: 142 MMOL/L (ref 136–145)
TRIGL SERPL-MCNC: 91 MG/DL

## 2019-05-28 PROCEDURE — 80061 LIPID PANEL: CPT

## 2019-05-28 PROCEDURE — 80053 COMPREHEN METABOLIC PANEL: CPT | Performed by: INTERNAL MEDICINE

## 2019-05-28 PROCEDURE — 36415 COLL VENOUS BLD VENIPUNCTURE: CPT | Performed by: INTERNAL MEDICINE

## 2019-05-31 DIAGNOSIS — E78.5 HYPERLIPIDEMIA, UNSPECIFIED HYPERLIPIDEMIA TYPE: ICD-10-CM

## 2019-06-03 RX ORDER — ATORVASTATIN CALCIUM 80 MG/1
TABLET, FILM COATED ORAL
Qty: 90 TABLET | Refills: 2 | Status: SHIPPED | OUTPATIENT
Start: 2019-06-03 | End: 2020-01-20 | Stop reason: SDUPTHER

## 2019-06-12 ENCOUNTER — APPOINTMENT (OUTPATIENT)
Dept: LAB | Facility: MEDICAL CENTER | Age: 74
End: 2019-06-12
Payer: MEDICARE

## 2019-06-12 ENCOUNTER — TRANSCRIBE ORDERS (OUTPATIENT)
Dept: ADMINISTRATIVE | Facility: HOSPITAL | Age: 74
End: 2019-06-12

## 2019-06-12 DIAGNOSIS — E11.9 DIABETES MELLITUS WITHOUT COMPLICATION (HCC): Primary | ICD-10-CM

## 2019-06-12 LAB
EST. AVERAGE GLUCOSE BLD GHB EST-MCNC: 148 MG/DL
HBA1C MFR BLD: 6.8 % (ref 4.2–6.3)

## 2019-06-12 PROCEDURE — 83036 HEMOGLOBIN GLYCOSYLATED A1C: CPT | Performed by: FAMILY MEDICINE

## 2019-06-12 PROCEDURE — 36415 COLL VENOUS BLD VENIPUNCTURE: CPT | Performed by: FAMILY MEDICINE

## 2019-06-17 ENCOUNTER — OFFICE VISIT (OUTPATIENT)
Dept: OBGYN CLINIC | Facility: MEDICAL CENTER | Age: 74
End: 2019-06-17
Payer: MEDICARE

## 2019-06-17 ENCOUNTER — APPOINTMENT (OUTPATIENT)
Dept: RADIOLOGY | Facility: MEDICAL CENTER | Age: 74
End: 2019-06-17
Payer: MEDICARE

## 2019-06-17 VITALS
SYSTOLIC BLOOD PRESSURE: 153 MMHG | WEIGHT: 181.4 LBS | BODY MASS INDEX: 25.4 KG/M2 | HEART RATE: 54 BPM | HEIGHT: 71 IN | DIASTOLIC BLOOD PRESSURE: 67 MMHG

## 2019-06-17 DIAGNOSIS — M25.511 RIGHT SHOULDER PAIN, UNSPECIFIED CHRONICITY: ICD-10-CM

## 2019-06-17 DIAGNOSIS — M25.511 RIGHT SHOULDER PAIN, UNSPECIFIED CHRONICITY: Primary | ICD-10-CM

## 2019-06-17 DIAGNOSIS — M75.31 CALCIFIC TENDINITIS OF RIGHT SHOULDER REGION: ICD-10-CM

## 2019-06-17 PROCEDURE — 99203 OFFICE O/P NEW LOW 30 MIN: CPT | Performed by: FAMILY MEDICINE

## 2019-06-17 PROCEDURE — 73030 X-RAY EXAM OF SHOULDER: CPT

## 2019-06-17 RX ORDER — PANTOPRAZOLE SODIUM 40 MG/1
40 TABLET, DELAYED RELEASE ORAL DAILY
COMMUNITY
Start: 2019-04-20 | End: 2021-04-01 | Stop reason: SDUPTHER

## 2019-06-17 RX ORDER — FAMOTIDINE 40 MG/1
40 TABLET, FILM COATED ORAL
Refills: 2 | COMMUNITY
Start: 2019-06-05 | End: 2020-06-24 | Stop reason: ALTCHOICE

## 2019-06-17 RX ORDER — AMLODIPINE BESYLATE 10 MG/1
TABLET ORAL
COMMUNITY
Start: 2019-04-16 | End: 2020-01-20 | Stop reason: SDUPTHER

## 2019-08-19 DIAGNOSIS — I70.213 ATHEROSCLEROSIS OF NATIVE ARTERY OF BOTH LOWER EXTREMITIES WITH INTERMITTENT CLAUDICATION (HCC): ICD-10-CM

## 2019-08-19 DIAGNOSIS — I65.23 CAROTID STENOSIS, BILATERAL: Primary | ICD-10-CM

## 2019-09-06 ENCOUNTER — APPOINTMENT (OUTPATIENT)
Dept: LAB | Facility: MEDICAL CENTER | Age: 74
End: 2019-09-06
Payer: MEDICARE

## 2019-09-06 ENCOUNTER — TRANSCRIBE ORDERS (OUTPATIENT)
Dept: ADMINISTRATIVE | Facility: HOSPITAL | Age: 74
End: 2019-09-06

## 2019-09-06 DIAGNOSIS — R19.7 DIARRHEA, UNSPECIFIED TYPE: ICD-10-CM

## 2019-09-06 DIAGNOSIS — K86.81 EXOCRINE PANCREATIC INSUFFICIENCY: ICD-10-CM

## 2019-09-06 DIAGNOSIS — R19.7 DIARRHEA, UNSPECIFIED TYPE: Primary | ICD-10-CM

## 2019-09-06 LAB
AMYLASE SERPL-CCNC: 35 IU/L (ref 25–115)
LIPASE SERPL-CCNC: 51 U/L (ref 73–393)

## 2019-09-06 PROCEDURE — 84311 SPECTROPHOTOMETRY: CPT

## 2019-09-06 PROCEDURE — 83690 ASSAY OF LIPASE: CPT

## 2019-09-06 PROCEDURE — 82150 ASSAY OF AMYLASE: CPT

## 2019-09-06 PROCEDURE — 36415 COLL VENOUS BLD VENIPUNCTURE: CPT

## 2019-09-06 PROCEDURE — 82656 EL-1 FECAL QUAL/SEMIQ: CPT

## 2019-09-12 ENCOUNTER — HOSPITAL ENCOUNTER (OUTPATIENT)
Dept: RADIOLOGY | Facility: MEDICAL CENTER | Age: 74
Discharge: HOME | End: 2019-09-12
Payer: MEDICARE

## 2019-09-12 DIAGNOSIS — I70.213 ATHEROSCLEROSIS OF NATIVE ARTERY OF BOTH LOWER EXTREMITIES WITH INTERMITTENT CLAUDICATION (HCC): ICD-10-CM

## 2019-09-12 DIAGNOSIS — I65.23 CAROTID STENOSIS, BILATERAL: ICD-10-CM

## 2019-09-12 LAB
ELASTASE PANC STL-MCNT: <50 UG ELAST./G
MISCELLANEOUS LAB TEST RESULT: NORMAL

## 2019-09-12 PROCEDURE — 93925 LOWER EXTREMITY STUDY: CPT

## 2019-09-12 PROCEDURE — 93880 EXTRACRANIAL BILAT STUDY: CPT

## 2019-09-12 PROCEDURE — 93923 UPR/LXTR ART STDY 3+ LVLS: CPT

## 2019-09-13 PROCEDURE — 93880 EXTRACRANIAL BILAT STUDY: CPT | Performed by: SURGERY

## 2019-09-13 PROCEDURE — 93922 UPR/L XTREMITY ART 2 LEVELS: CPT | Performed by: SURGERY

## 2019-09-13 PROCEDURE — 93925 LOWER EXTREMITY STUDY: CPT | Performed by: SURGERY

## 2019-10-30 DIAGNOSIS — I25.10 CORONARY ARTERY DISEASE INVOLVING NATIVE HEART WITHOUT ANGINA PECTORIS, UNSPECIFIED VESSEL OR LESION TYPE: ICD-10-CM

## 2019-10-30 RX ORDER — CLOPIDOGREL BISULFATE 75 MG/1
TABLET ORAL
Qty: 90 TABLET | Refills: 3 | Status: SHIPPED | OUTPATIENT
Start: 2019-10-30 | End: 2020-01-20 | Stop reason: SDUPTHER

## 2019-11-27 ENCOUNTER — OFFICE VISIT (OUTPATIENT)
Dept: CARDIOLOGY CLINIC | Facility: MEDICAL CENTER | Age: 74
End: 2019-11-27
Payer: MEDICARE

## 2019-11-27 ENCOUNTER — OFFICE VISIT (OUTPATIENT)
Dept: OBGYN CLINIC | Facility: CLINIC | Age: 74
End: 2019-11-27
Payer: MEDICARE

## 2019-11-27 ENCOUNTER — APPOINTMENT (OUTPATIENT)
Dept: RADIOLOGY | Facility: AMBULARY SURGERY CENTER | Age: 74
End: 2019-11-27
Payer: MEDICARE

## 2019-11-27 VITALS
SYSTOLIC BLOOD PRESSURE: 122 MMHG | WEIGHT: 183.8 LBS | HEART RATE: 54 BPM | OXYGEN SATURATION: 98 % | BODY MASS INDEX: 25.73 KG/M2 | DIASTOLIC BLOOD PRESSURE: 64 MMHG | HEIGHT: 71 IN

## 2019-11-27 VITALS
BODY MASS INDEX: 26.2 KG/M2 | WEIGHT: 183 LBS | HEART RATE: 54 BPM | SYSTOLIC BLOOD PRESSURE: 126 MMHG | HEIGHT: 70 IN | DIASTOLIC BLOOD PRESSURE: 60 MMHG

## 2019-11-27 DIAGNOSIS — M79.604 RIGHT LEG PAIN: Primary | ICD-10-CM

## 2019-11-27 DIAGNOSIS — E78.5 DYSLIPIDEMIA: Chronic | ICD-10-CM

## 2019-11-27 DIAGNOSIS — Z95.1 S/P CABG (CORONARY ARTERY BYPASS GRAFT): Chronic | ICD-10-CM

## 2019-11-27 DIAGNOSIS — I65.23 ASYMPTOMATIC BILATERAL CAROTID ARTERY STENOSIS: Chronic | ICD-10-CM

## 2019-11-27 DIAGNOSIS — I35.0 NONRHEUMATIC AORTIC VALVE STENOSIS: ICD-10-CM

## 2019-11-27 DIAGNOSIS — I10 ESSENTIAL HYPERTENSION: Chronic | ICD-10-CM

## 2019-11-27 DIAGNOSIS — I25.10 MULTIPLE VESSEL CORONARY ARTERY DISEASE: ICD-10-CM

## 2019-11-27 DIAGNOSIS — I73.9 PAD (PERIPHERAL ARTERY DISEASE) (HCC): Primary | ICD-10-CM

## 2019-11-27 DIAGNOSIS — M79.604 RIGHT LEG PAIN: ICD-10-CM

## 2019-11-27 PROCEDURE — 99214 OFFICE O/P EST MOD 30 MIN: CPT | Performed by: INTERNAL MEDICINE

## 2019-11-27 PROCEDURE — 99213 OFFICE O/P EST LOW 20 MIN: CPT | Performed by: PHYSICAL MEDICINE & REHABILITATION

## 2019-11-27 PROCEDURE — 73590 X-RAY EXAM OF LOWER LEG: CPT

## 2019-11-27 NOTE — PROGRESS NOTES
Cardiology   Edd Kayser  66 y o  male MRN: [id number]        Impression:  1  Coronary artery disease s/p CABG - doing well  2  Hypertension - borderline control  3  Severe bilateral carotid disease - followed by vascular surgery  4  Dyslipidemia - doing well  5  Peripheral arterial disease - s/p LLE revascularization  Doing well  6  Aortic stenosis - moderate to severe 5/19     Recommendations:  1  Continue current medications  2  Follow up in 6 months  HPI: Edd Kayser  is a 66y o  year old male with coronary artery disease s/p CABG and LLE revascularization who returns for follow up  No chest pain, shortness of breath, or palpitations  Recently diagnosed with pancreatic insufficiency  Also had some colonic surgery   No chest pain, shortness of breath, or palpitations   Followed by Vascular Surgery for carotid and peripheral vascular disease  No chest pain, shortness of breath, or palpitations  Review of Systems   Constitutional: Negative  HENT: Negative  Eyes: Negative  Respiratory: Negative for chest tightness and shortness of breath  Cardiovascular: Negative for chest pain, palpitations and leg swelling  Gastrointestinal: Negative  Endocrine: Negative  Genitourinary: Negative  Musculoskeletal: Negative  Skin: Negative  Allergic/Immunologic: Negative  Neurological: Negative  Hematological: Negative  Psychiatric/Behavioral: Negative  All other systems reviewed and are negative          Past Medical History:   Diagnosis Date    Arteriosclerosis of arteries of extremities ([address] )     CAD (coronary artery disease)     Diabetes (HCC)     Dyslipidemia     GERD (gastroesophageal reflux disease)     Hyperlipidemia     Hypertension     [facility]peripheral [facility])      Past Surgical History:   Procedure Laterality Date    APPENDECTOMY      COLECTOMY      COLONOSCOPY      CORONARY ARTERY BYPASS GRAFT  2013    X 2    FEMORAL ARTERY - POPLITEAL ARTERY BYPASS GRAFT      HEMORRHOID SURGERY      IR ABDOMINAL ANGIOGRAPHY / INTERVENTION  11/19/2018    ID SLCTV CATHJ 3RD+ ORD SLCTV ABDL PEL/LXTR [address] Left 8/12/2016    Procedure: LEFT FEMORAL ARTERIOGRAM; BALLOON ANGIOPLASTY; SFA  AND FEMORAL AT VEIN GRAFT;  Surgeon: Joceline Garcia MD;  [facility];  Service: Vascular    TONSILLECTOMY AND ADENOIDECTOMY       Social History     Substance and Sexual Activity   Alcohol Use Yes    Comment: occational     Social History     Substance and Sexual Activity   Drug Use No     Social History     Tobacco Use   Smoking Status Current Every Day Smoker    Packs/day: 1 00   Smokeless Tobacco Never Used     Family History   Problem Relation Age of Onset    Heart attack Father     Other Sister         bypass and vlave replacement    Stroke Paternal Uncle     Arrhythmia Neg Hx     Asthma Neg Hx     Clotting disorder Neg Hx     Fainting Neg Hx     Anuerysm Neg Hx     Hypertension Neg Hx         unsure     Hyperlipidemia Neg Hx     Heart failure Neg Hx        Allergies:  No Known Allergies    Medications:     Current Outpatient Medications:     amLODIPine (NORVASC) 10 mg tablet, , Disp: , Rfl:     AMYLASE-LIPASE-PROTEASE PO, Take by mouth, Disp: , Rfl:     aspirin (ECOTRIN LOW STRENGTH) 81 mg EC tablet, Take 81 mg by mouth daily, Disp: , Rfl:     atorvastatin (LIPITOR) 80 mg tablet, TAKE 1 TABLET AT BEDTIME, Disp: 90 tablet, Rfl: 2    benazepril (LOTENSIN) 40 MG tablet, Take 40 mg by mouth daily  , Disp: , Rfl:     clopidogrel (PLAVIX) 75 mg tablet, TAKE 1 TABLET EVERY DAY, Disp: 90 tablet, Rfl: 3    metFORMIN (GLUCOPHAGE) 500 mg tablet, , Disp: , Rfl:     metoprolol tartrate (LOPRESSOR) 25 mg tablet, Take 25 mg by mouth 2 (two) times a day , Disp: , Rfl:     Multiple Vitamin (MULTIVITAMIN) tablet, Take 1 tablet by mouth daily  , Disp: , Rfl:     pantoprazole (PROTONIX) 40 mg tablet, , Disp: , Rfl:     famotidine (PEPCID) 40 MG tablet, Take 40 mg by mouth daily at bedtime, Disp: , Rfl: 2    hydrochlorothiazide (MICROZIDE) 12 5 mg capsule, TAKE 1 CAPSULE (12 5 MG TOTAL) BY MOUTH DAILY, Disp: 90 capsule, Rfl: 3      Wt Readings from Last 3 Encounters:   11/27/19 83 4 kg (183 lb 12 8 oz)   06/17/19 82 3 kg (181 lb 6 4 oz)   04/04/19 82 7 kg (182 lb 4 8 oz)     Temp Readings from Last 3 Encounters:   12/19/18 (!) 97 °F (36 1 °C)   11/19/18 97 8 °F (36 6 °C) (Temporal)   10/22/18 98 4 °F (36 9 °C) (Tympanic)     BP Readings from Last 3 Encounters:   11/27/19 122/64   06/17/19 153/67   04/04/19 140/50     Pulse Readings from Last 3 Encounters:   11/27/19 (!) 54   06/17/19 (!) 54   04/04/19 (!) 47         Physical Exam   Constitutional: He is oriented to person, place, and time  He appears well-developed  HENT:   Head: Atraumatic  Eyes: EOM are normal    Neck: Normal range of motion  Cardiovascular: Normal rate, regular rhythm and normal heart sounds  Pulmonary/Chest: Effort normal and breath sounds normal    Abdominal: Soft  Musculoskeletal: Normal range of motion  Neurological: He is alert and oriented to person, place, and time  Skin: Skin is warm and dry  Psychiatric: He has a normal mood and affect           Laboratory Studies:  CMP:  Lab Results   Component Value Date     11/22/2015    K 4 2 05/28/2019     (H) 05/28/2019    CO2 24 05/28/2019    ANIONGAP 7 11/22/2015    BUN 29 (H) 05/28/2019    CREATININE 1 57 (H) 05/28/2019    GLUCOSE 125 11/22/2015    AST 14 05/28/2019    ALT 18 05/28/2019    EGFR 42 05/28/2019       Lipid Profile:   No results found for: CHOL  Lab Results   Component Value Date    HDL 34 (L) 05/28/2019     Lab Results   Component Value Date    LDLCALC 29 05/28/2019     Lab Results   Component Value Date    TRIG 91 05/28/2019       Cardiac testing:     Results for orders placed during the hospital encounter of 05/09/19   Echo complete with contrast if indicated    Christin Agrawal [address]  [phone]    Transthoracic Echocardiogram  2D, M-mode, Doppler, and Color Doppler    Study date:  09-May-2019    Patient: Bre Leon  MR number: [de-identified]  Account number: [de-identified]  : 1945  Age: 66 years  Gender: Male  Status: Outpatient  Location: [place] [address]   Height: 71 in  Weight: 181 7 lb  BP: 140/ 50 mmHg    Indications: Assess left ventricular function  Follow up aortic stenosis  Diagnoses: I25 10 - Atherosclerotic heart disease of native coronary artery without angina pectoris, I35 0 - Nonrheumatic aortic (valve) stenosis    Sonographer:  Sara Higuera RDCS  Primary Physician:  Arianne Mcdonald MD  Referring Physician:  Nela Lou MD  Group:  [facility]  Interpreting Physician:  Nela Lou MD    SUMMARY    LEFT VENTRICLE:  Size was normal   Systolic function was normal  Ejection fraction was estimated to be 70 %  There was mild concentric hypertrophy  Doppler parameters were consistent with abnormal left ventricular relaxation (grade 1 diastolic dysfunction)  RIGHT VENTRICLE:  The size was normal   Systolic function was normal     LEFT ATRIUM:  The atrium was mildly dilated  AORTIC VALVE:  There was moderate to severe stenosis  COMPARISONS:  Comparison was made with the previous study of 10-Aug-2017  Aortic stenosis has worsened  HISTORY: PRIOR HISTORY: hypertension, smoker, carotid disease, dyslipidemia, PA, moderate aortic stenosis, CABG    PROCEDURE: The study was performed in the [address]  This was a routine study  The transthoracic approach was used  The study included complete 2D imaging, M-mode, complete spectral Doppler, and color Doppler  The heart rate was  53 bpm, at the start of the study  Images were obtained from the parasternal, apical, subcostal, and suprasternal notch acoustic windows   Echocardiographic views were limited due to restricted patient mobility  This was a technically  difficult study  LEFT VENTRICLE: Size was normal  Systolic function was normal  Ejection fraction was estimated to be 70 %  There were no regional wall motion abnormalities  Wall thickness was mildly increased  There was mild concentric hypertrophy  DOPPLER: Doppler parameters were consistent with abnormal left ventricular relaxation (grade 1 diastolic dysfunction)  RIGHT VENTRICLE: The size was normal  Systolic function was normal  Wall thickness was normal     LEFT ATRIUM: The atrium was mildly dilated  RIGHT ATRIUM: Size was normal     MITRAL VALVE: Valve structure was normal  There was normal leaflet separation  DOPPLER: The transmitral velocity was within the normal range  There was no evidence for stenosis  There was no regurgitation  AORTIC VALVE: The valve was trileaflet  Leaflets exhibited moderately increased thickness, mild calcification, moderately reduced cuspal separation, and reduced mobility  DOPPLER: Transaortic velocity was increased due to valvular  stenosis  There was moderate to severe stenosis  There was no regurgitation  TRICUSPID VALVE: The valve structure was normal  There was normal leaflet separation  DOPPLER: The transtricuspid velocity was within the normal range  There was no evidence for stenosis  There was no regurgitation  PULMONIC VALVE: Not well visualized  PERICARDIUM: There was no pericardial effusion  The pericardium was normal in appearance  AORTA: The root exhibited normal size  SYSTEMIC VEINS: IVC: The inferior vena cava was normal in size and course   Respirophasic changes were normal     MEASUREMENT TABLES    DOPPLER MEASUREMENTS  Aortic valve   (Reference normals)  Peak gradient   46 mmHg   (--)  Mean gradient   24 mmHg   (--)  Valve area, cont   1 cmï¾²   (--)    SYSTEM MEASUREMENT TABLES    2D  %FS: 40 07 %  AV Diam: 3 17 cm  EDV(Teich): 117 88 ml  EF(Teich): 70 49 %  ESV(Teich): 34 78 ml  IVSd: 0 98 cm  LA Area: 21 82 cm2  LA Diam: 3 59 cm  LVEDV MOD A4C: 128 73 ml  LVEF MOD A4C: 70 71 %  LVESV MOD A4C: 37 71 ml  LVIDd: 4 99 cm  LVIDs: 2 99 cm  LVLd A4C: 10 16 cm  LVLs A4C: 8 2 cm  LVOT Diam: 2 05 cm  LVPWd: 1 17 cm  RA Area: 16 23 cm2  RV Diam : 3 66 cm  SI(Teich): 40 93 ml/m2  SV MOD A4C: 91 02 ml  SV(Cube): 97 71 ml  SV(Teich): 83 1 ml    CW  AV Env  Ti: 395 56 ms  AV MaxP 16 mmHg  AV VTI: 88 34 cm  AV Vmax: 3 36 m/s  AV Vmean: 2 23 m/s  AV meanP 56 mmHg    MM  TAPSE: 2 34 cm    PW  LUIS (VTI): 1 01 cm2  LUIS Vmax: 1 01 cm2  E': 0 05 m/s  E/E': 16 51  LVOT Env  Ti: 425 14 ms  LVOT VTI: 27 09 cm  LVOT Vmax: 1 04 m/s  LVOT Vmean: 0 64 m/s  LVOT maxP 3 mmHg  LVOT meanP 96 mmHg  MV A Pop: 1 19 m/s  MV Dec [place]: 2 19 m/s2  MV DecT: 347 79 ms  MV E Pop: 0 76 m/s  MV E/A Ratio: 0 64    [employer] Accredited Echocardiography Laboratory    Prepared and electronically signed by    Joe Gregory MD  Signed [id number] 14:16:59

## 2019-11-27 NOTE — PROGRESS NOTES
1  Right leg pain  XR tibia fibula 2 vw right     Orders Placed This Encounter   Procedures    XR tibia fibula 2 vw right        Imaging Studies (I personally reviewed images in PACS and report):  Right tibia/fibula x-rays dated 11/27/2019  These images show surgical clips in the posterior leg compartment  No bony abnormalities noted  Impression:  Right distal tibia soft tissue swelling likely secondary to soft tissue injury in the form of myofascial herniation as seen in medial tibial stress syndrome versus a lipoma  Regardless, if the patient's pain worsens or the mass increases any, we will obtain advanced imaging of it  As below, the patient denies fatigue, drastic in unintentional weight changes, nighttime pain her history of cancer  I will see him back if this happens  sReturn if symptoms worsen or fail to improve  HPI:  Jerod Leomnpool  is a 66 y o  male  who presents for evaluation of   Chief Complaint   Patient presents with    Right Leg - Pain       Onset/Mechanism:  He noticed a mass over the distal right tibia  Initially it was painful with walking but this resolved and he has had no pain there  He denies pain that is worse at night  He denies fatigue, unintentional/drastic weight changes for history of cancer  Location:  Right distal tibia  Radiation:  Denies  Quality:  Aching  Provocative:  As above  Severity:  Not severe  Associated Symptoms:  None  Treatment so far:  None  Review of Systems   Constitutional: Negative for activity change and fever  HENT: Negative for sore throat  Eyes: Negative for visual disturbance  Respiratory: Negative for shortness of breath  Cardiovascular: Negative for chest pain  Gastrointestinal: Negative for abdominal pain  Endocrine: Negative for polydipsia  Genitourinary: Negative for difficulty urinating  Musculoskeletal: Negative for arthralgias  Right distal tibia mass   Skin: Negative for rash  Allergic/Immunologic: Negative for immunocompromised state  Neurological: Negative for numbness  Hematological: Does not bruise/bleed easily  Psychiatric/Behavioral: Negative for confusion         Following history reviewed and updated:  Past Medical History:   Diagnosis Date    Arteriosclerosis of arteries of extremities (HCC)     CAD (coronary artery disease)     Diabetes ([address] )     Dyslipidemia     GERD (gastroesophageal reflux disease)     Hyperlipidemia     Hypertension     [facility]      Past Surgical History:   Procedure Laterality Date    APPENDECTOMY      COLECTOMY      COLONOSCOPY      CORONARY ARTERY BYPASS GRAFT  2013    X 2    FEMORAL ARTERY - POPLITEAL ARTERY BYPASS GRAFT      HEMORRHOID SURGERY      IR ABDOMINAL ANGIOGRAPHY / INTERVENTION  11/19/2018    IN SLCTV CATHJ 3RD+ ORD SLCTV ABDL PEL/LXTR [address] [facility] Left 8/12/2016    Procedure: LEFT FEMORAL ARTERIOGRAM; BALLOON ANGIOPLASTY; SFA  AND FEMORAL AT VEIN GRAFT;  Surgeon: Edgar Au MD;  [facility];  Service: Vascular    TONSILLECTOMY AND ADENOIDECTOMY       Social History   Social History     Substance and Sexual Activity   Alcohol Use Yes    Comment: occational     Social History     Substance and Sexual Activity   Drug Use No     Social History     Tobacco Use   Smoking Status Current Every Day Smoker    Packs/day: 1 00   Smokeless Tobacco Never Used     Family History   Problem Relation Age of Onset    Heart attack Father     Other Sister         bypass and vlave replacement    Stroke Paternal Uncle     Arrhythmia Neg Hx     Asthma Neg Hx     Clotting disorder Neg Hx     Fainting Neg Hx     Anuerysm Neg Hx     Hypertension Neg Hx         unsure     Hyperlipidemia Neg Hx     Heart failure Neg Hx      No Known Allergies     Constitutional:  /60 (BP Location: Right arm, Patient Position: Sitting, Cuff Size: Standard)   Pulse (!) 54   Ht 5' 10" (1 778 m)   Wt 83 kg (183 lb)   BMI 26 26 kg/m²    General: NAD  Eyes: Anicteric sclerae  Neck: Supple  Lungs: Unlabored breathing  Cardiovascular: No lower extremity edema  Skin: Intact without erythema  Neurologic: Sensation intact to light touch  Psychiatric: Mood and affect are appropriate  Right Ankle Exam   Right ankle exam is normal     Range of Motion   The patient has normal right ankle ROM  Muscle Strength   The patient has normal right ankle strength  Tests   Anterior drawer: negative  Varus tilt: negative    Other   Erythema: absent  Scars: absent  Sensation: normal  Pulse: present     Comments:  Distal tibia with a small soft tissue prominence that is mobile  Procedures - none for this visit  This document was recorded using voice recognition software and errors may be noted

## 2020-01-20 DIAGNOSIS — I10 ESSENTIAL HYPERTENSION: Primary | ICD-10-CM

## 2020-01-20 DIAGNOSIS — E78.5 HYPERLIPIDEMIA, UNSPECIFIED HYPERLIPIDEMIA TYPE: ICD-10-CM

## 2020-01-20 DIAGNOSIS — I25.10 CORONARY ARTERY DISEASE INVOLVING NATIVE CORONARY ARTERY OF NATIVE HEART WITHOUT ANGINA PECTORIS: ICD-10-CM

## 2020-01-20 DIAGNOSIS — I25.10 CORONARY ARTERY DISEASE INVOLVING NATIVE HEART WITHOUT ANGINA PECTORIS, UNSPECIFIED VESSEL OR LESION TYPE: ICD-10-CM

## 2020-01-20 RX ORDER — CLOPIDOGREL BISULFATE 75 MG/1
75 TABLET ORAL DAILY
Qty: 90 TABLET | Refills: 3 | Status: SHIPPED | OUTPATIENT
Start: 2020-01-20 | End: 2020-08-28 | Stop reason: SDUPTHER

## 2020-01-20 RX ORDER — HYDROCHLOROTHIAZIDE 12.5 MG/1
12.5 CAPSULE, GELATIN COATED ORAL DAILY
Qty: 90 CAPSULE | Refills: 3 | Status: SHIPPED | OUTPATIENT
Start: 2020-01-20 | End: 2020-05-04

## 2020-01-20 RX ORDER — ATORVASTATIN CALCIUM 80 MG/1
80 TABLET, FILM COATED ORAL
Qty: 90 TABLET | Refills: 3 | Status: SHIPPED | OUTPATIENT
Start: 2020-01-20 | End: 2020-09-25

## 2020-01-20 RX ORDER — BENAZEPRIL HYDROCHLORIDE 40 MG/1
40 TABLET, FILM COATED ORAL DAILY
Qty: 90 TABLET | Refills: 3 | Status: SHIPPED | OUTPATIENT
Start: 2020-01-20 | End: 2021-01-01

## 2020-01-20 RX ORDER — AMLODIPINE BESYLATE 10 MG/1
10 TABLET ORAL DAILY
Qty: 90 TABLET | Refills: 3 | Status: SHIPPED | OUTPATIENT
Start: 2020-01-20 | End: 2021-01-01

## 2020-01-23 ENCOUNTER — LAB (OUTPATIENT)
Dept: LAB | Facility: MEDICAL CENTER | Age: 75
End: 2020-01-23
Payer: MEDICARE

## 2020-01-23 ENCOUNTER — TRANSCRIBE ORDERS (OUTPATIENT)
Dept: ADMINISTRATIVE | Facility: HOSPITAL | Age: 75
End: 2020-01-23

## 2020-01-23 DIAGNOSIS — I10 PRIMARY HYPERTENSION: Primary | ICD-10-CM

## 2020-01-23 DIAGNOSIS — I10 PRIMARY HYPERTENSION: ICD-10-CM

## 2020-01-23 DIAGNOSIS — E11.9 DIABETES MELLITUS WITHOUT COMPLICATION (HCC): ICD-10-CM

## 2020-01-23 DIAGNOSIS — E55.9 VITAMIN D DEFICIENCY: ICD-10-CM

## 2020-01-23 DIAGNOSIS — K86.81 EXOCRINE PANCREATIC INSUFFICIENCY: ICD-10-CM

## 2020-01-23 DIAGNOSIS — K86.81 EXOCRINE PANCREATIC INSUFFICIENCY: Primary | ICD-10-CM

## 2020-01-23 LAB
25(OH)D3 SERPL-MCNC: 29.9 NG/ML (ref 30–100)
ALBUMIN SERPL BCP-MCNC: 3.6 G/DL (ref 3.5–5)
ALP SERPL-CCNC: 54 U/L (ref 46–116)
ALT SERPL W P-5'-P-CCNC: 18 U/L (ref 12–78)
AMYLASE SERPL-CCNC: 35 IU/L (ref 25–115)
ANION GAP SERPL CALCULATED.3IONS-SCNC: 3 MMOL/L (ref 4–13)
AST SERPL W P-5'-P-CCNC: 9 U/L (ref 5–45)
BASOPHILS # BLD AUTO: 0.03 THOUSANDS/ΜL (ref 0–0.1)
BASOPHILS NFR BLD AUTO: 0 % (ref 0–1)
BILIRUB SERPL-MCNC: 0.49 MG/DL (ref 0.2–1)
BUN SERPL-MCNC: 18 MG/DL (ref 5–25)
CALCIUM SERPL-MCNC: 9 MG/DL (ref 8.3–10.1)
CHLORIDE SERPL-SCNC: 107 MMOL/L (ref 100–108)
CHOLEST SERPL-MCNC: 104 MG/DL (ref 50–200)
CO2 SERPL-SCNC: 31 MMOL/L (ref 21–32)
CREAT SERPL-MCNC: 1.74 MG/DL (ref 0.6–1.3)
EOSINOPHIL # BLD AUTO: 0.38 THOUSAND/ΜL (ref 0–0.61)
EOSINOPHIL NFR BLD AUTO: 5 % (ref 0–6)
ERYTHROCYTE [DISTWIDTH] IN BLOOD BY AUTOMATED COUNT: 12.7 % (ref 11.6–15.1)
EST. AVERAGE GLUCOSE BLD GHB EST-MCNC: 151 MG/DL
GFR SERPL CREATININE-BSD FRML MDRD: 37 ML/MIN/1.73SQ M
GLUCOSE P FAST SERPL-MCNC: 128 MG/DL (ref 65–99)
HBA1C MFR BLD: 6.9 % (ref 4.2–6.3)
HCT VFR BLD AUTO: 38.7 % (ref 36.5–49.3)
HDLC SERPL-MCNC: 36 MG/DL
HGB BLD-MCNC: 12.6 G/DL (ref 12–17)
IMM GRANULOCYTES # BLD AUTO: 0.04 THOUSAND/UL (ref 0–0.2)
IMM GRANULOCYTES NFR BLD AUTO: 1 % (ref 0–2)
LDLC SERPL CALC-MCNC: 52 MG/DL (ref 0–100)
LIPASE SERPL-CCNC: 44 U/L (ref 73–393)
LYMPHOCYTES # BLD AUTO: 1.76 THOUSANDS/ΜL (ref 0.6–4.47)
LYMPHOCYTES NFR BLD AUTO: 24 % (ref 14–44)
MCH RBC QN AUTO: 30.1 PG (ref 26.8–34.3)
MCHC RBC AUTO-ENTMCNC: 32.6 G/DL (ref 31.4–37.4)
MCV RBC AUTO: 92 FL (ref 82–98)
MONOCYTES # BLD AUTO: 0.61 THOUSAND/ΜL (ref 0.17–1.22)
MONOCYTES NFR BLD AUTO: 8 % (ref 4–12)
NEUTROPHILS # BLD AUTO: 4.64 THOUSANDS/ΜL (ref 1.85–7.62)
NEUTS SEG NFR BLD AUTO: 62 % (ref 43–75)
NONHDLC SERPL-MCNC: 68 MG/DL
NRBC BLD AUTO-RTO: 0 /100 WBCS
PLATELET # BLD AUTO: 216 THOUSANDS/UL (ref 149–390)
PMV BLD AUTO: 10.4 FL (ref 8.9–12.7)
POTASSIUM SERPL-SCNC: 4.2 MMOL/L (ref 3.5–5.3)
PROT SERPL-MCNC: 7.2 G/DL (ref 6.4–8.2)
RBC # BLD AUTO: 4.19 MILLION/UL (ref 3.88–5.62)
SODIUM SERPL-SCNC: 141 MMOL/L (ref 136–145)
T4 FREE SERPL-MCNC: 1.09 NG/DL (ref 0.76–1.46)
TRIGL SERPL-MCNC: 81 MG/DL
TSH SERPL DL<=0.05 MIU/L-ACNC: 2.54 UIU/ML (ref 0.36–3.74)
URATE SERPL-MCNC: 8.5 MG/DL (ref 4.2–8)
WBC # BLD AUTO: 7.46 THOUSAND/UL (ref 4.31–10.16)

## 2020-01-23 PROCEDURE — 84443 ASSAY THYROID STIM HORMONE: CPT

## 2020-01-23 PROCEDURE — 36415 COLL VENOUS BLD VENIPUNCTURE: CPT

## 2020-01-23 PROCEDURE — 85025 COMPLETE CBC W/AUTO DIFF WBC: CPT

## 2020-01-23 PROCEDURE — 83690 ASSAY OF LIPASE: CPT

## 2020-01-23 PROCEDURE — 80061 LIPID PANEL: CPT

## 2020-01-23 PROCEDURE — 80053 COMPREHEN METABOLIC PANEL: CPT

## 2020-01-23 PROCEDURE — 84439 ASSAY OF FREE THYROXINE: CPT

## 2020-01-23 PROCEDURE — 82656 EL-1 FECAL QUAL/SEMIQ: CPT

## 2020-01-23 PROCEDURE — 84550 ASSAY OF BLOOD/URIC ACID: CPT

## 2020-01-23 PROCEDURE — 82306 VITAMIN D 25 HYDROXY: CPT

## 2020-01-23 PROCEDURE — 83036 HEMOGLOBIN GLYCOSYLATED A1C: CPT

## 2020-01-23 PROCEDURE — 82150 ASSAY OF AMYLASE: CPT

## 2020-01-29 LAB — ELASTASE PANC STL-MCNT: 64 UG ELAST./G

## 2020-02-18 DIAGNOSIS — I10 ESSENTIAL HYPERTENSION: ICD-10-CM

## 2020-03-02 DIAGNOSIS — I65.23 CAROTID STENOSIS, BILATERAL: Primary | ICD-10-CM

## 2020-03-02 DIAGNOSIS — I70.213 ATHEROSCLEROSIS OF NATIVE ARTERIES OF EXTREMITIES WITH INTERMITTENT CLAUDICATION, BILATERAL LEGS (HCC): ICD-10-CM

## 2020-03-03 ENCOUNTER — TELEPHONE (OUTPATIENT)
Dept: ADMINISTRATIVE | Facility: HOSPITAL | Age: 75
End: 2020-03-03

## 2020-04-20 ENCOUNTER — TELEPHONE (OUTPATIENT)
Dept: FAMILY MEDICINE CLINIC | Facility: CLINIC | Age: 75
End: 2020-04-20

## 2020-05-04 ENCOUNTER — OFFICE VISIT (OUTPATIENT)
Dept: CARDIOLOGY CLINIC | Facility: CLINIC | Age: 75
End: 2020-05-04
Payer: MEDICARE

## 2020-05-04 VITALS
OXYGEN SATURATION: 96 % | BODY MASS INDEX: 26.9 KG/M2 | SYSTOLIC BLOOD PRESSURE: 134 MMHG | DIASTOLIC BLOOD PRESSURE: 62 MMHG | WEIGHT: 187.5 LBS

## 2020-05-04 DIAGNOSIS — I10 ESSENTIAL HYPERTENSION: Chronic | ICD-10-CM

## 2020-05-04 DIAGNOSIS — E78.5 DYSLIPIDEMIA: Chronic | ICD-10-CM

## 2020-05-04 DIAGNOSIS — Z95.1 S/P CABG (CORONARY ARTERY BYPASS GRAFT): Chronic | ICD-10-CM

## 2020-05-04 DIAGNOSIS — I73.9 PAD (PERIPHERAL ARTERY DISEASE) (HCC): ICD-10-CM

## 2020-05-04 DIAGNOSIS — I70.213 ATHEROSCLEROSIS OF NATIVE ARTERIES OF EXTREMITIES WITH INTERMITTENT CLAUDICATION, BILATERAL LEGS (HCC): ICD-10-CM

## 2020-05-04 DIAGNOSIS — I77.9 AORTO-ILIAC DISEASE (HCC): Primary | Chronic | ICD-10-CM

## 2020-05-04 DIAGNOSIS — I35.0 NONRHEUMATIC AORTIC VALVE STENOSIS: ICD-10-CM

## 2020-05-04 PROCEDURE — 99214 OFFICE O/P EST MOD 30 MIN: CPT | Performed by: INTERNAL MEDICINE

## 2020-05-22 ENCOUNTER — HOSPITAL ENCOUNTER (OUTPATIENT)
Dept: RADIOLOGY | Facility: MEDICAL CENTER | Age: 75
Discharge: HOME | End: 2020-05-22
Payer: MEDICARE

## 2020-05-22 DIAGNOSIS — I70.213 ATHEROSCLEROSIS OF NATIVE ARTERIES OF EXTREMITIES WITH INTERMITTENT CLAUDICATION, BILATERAL LEGS (HCC): ICD-10-CM

## 2020-05-22 DIAGNOSIS — I65.23 CAROTID STENOSIS, BILATERAL: ICD-10-CM

## 2020-05-22 PROCEDURE — 93923 UPR/LXTR ART STDY 3+ LVLS: CPT

## 2020-05-22 PROCEDURE — 93880 EXTRACRANIAL BILAT STUDY: CPT

## 2020-05-22 PROCEDURE — 93925 LOWER EXTREMITY STUDY: CPT

## 2020-05-25 PROCEDURE — 93922 UPR/L XTREMITY ART 2 LEVELS: CPT | Performed by: SURGERY

## 2020-05-25 PROCEDURE — 93880 EXTRACRANIAL BILAT STUDY: CPT | Performed by: SURGERY

## 2020-05-25 PROCEDURE — 93925 LOWER EXTREMITY STUDY: CPT | Performed by: SURGERY

## 2020-05-26 ENCOUNTER — TELEPHONE (OUTPATIENT)
Dept: VASCULAR SURGERY | Facility: CLINIC | Age: 75
End: 2020-05-26

## 2020-05-28 ENCOUNTER — HOSPITAL ENCOUNTER (OUTPATIENT)
Dept: NON INVASIVE DIAGNOSTICS | Facility: MEDICAL CENTER | Age: 75
Discharge: HOME | End: 2020-05-28
Payer: MEDICARE

## 2020-05-28 DIAGNOSIS — I35.0 NONRHEUMATIC AORTIC VALVE STENOSIS: ICD-10-CM

## 2020-05-28 PROCEDURE — 93306 TTE W/DOPPLER COMPLETE: CPT | Performed by: INTERNAL MEDICINE

## 2020-05-28 PROCEDURE — 93306 TTE W/DOPPLER COMPLETE: CPT

## 2020-06-03 ENCOUNTER — TELEPHONE (OUTPATIENT)
Dept: CARDIOLOGY CLINIC | Facility: CLINIC | Age: 75
End: 2020-06-03

## 2020-06-23 ENCOUNTER — TELEPHONE (OUTPATIENT)
Dept: VASCULAR SURGERY | Facility: CLINIC | Age: 75
End: 2020-06-23

## 2020-06-24 ENCOUNTER — OFFICE VISIT (OUTPATIENT)
Dept: VASCULAR SURGERY | Facility: CLINIC | Age: 75
End: 2020-06-24
Payer: MEDICARE

## 2020-06-24 VITALS
BODY MASS INDEX: 26.48 KG/M2 | SYSTOLIC BLOOD PRESSURE: 170 MMHG | HEIGHT: 70 IN | WEIGHT: 185 LBS | TEMPERATURE: 97.2 F | HEART RATE: 54 BPM | DIASTOLIC BLOOD PRESSURE: 60 MMHG

## 2020-06-24 DIAGNOSIS — I65.23 ASYMPTOMATIC BILATERAL CAROTID ARTERY STENOSIS: Chronic | ICD-10-CM

## 2020-06-24 DIAGNOSIS — I65.23 CAROTID STENOSIS, BILATERAL: Primary | ICD-10-CM

## 2020-06-24 DIAGNOSIS — I70.213 ATHEROSCLEROSIS OF NATIVE ARTERIES OF EXTREMITIES WITH INTERMITTENT CLAUDICATION, BILATERAL LEGS (HCC): ICD-10-CM

## 2020-06-24 DIAGNOSIS — T82.858S STENOSIS OF NONCORONARY BYPASS GRAFT, SEQUELA: ICD-10-CM

## 2020-06-24 DIAGNOSIS — I77.9 AORTO-ILIAC DISEASE (HCC): Chronic | ICD-10-CM

## 2020-06-24 PROCEDURE — 99214 OFFICE O/P EST MOD 30 MIN: CPT | Performed by: SURGERY

## 2020-07-08 DIAGNOSIS — Z79.4 TYPE 2 DIABETES MELLITUS WITHOUT COMPLICATION, WITH LONG-TERM CURRENT USE OF INSULIN (HCC): Primary | ICD-10-CM

## 2020-07-08 DIAGNOSIS — E11.9 TYPE 2 DIABETES MELLITUS WITHOUT COMPLICATION, WITH LONG-TERM CURRENT USE OF INSULIN (HCC): Primary | ICD-10-CM

## 2020-08-20 LAB
LEFT EYE DIABETIC RETINOPATHY: NORMAL
RIGHT EYE DIABETIC RETINOPATHY: NORMAL

## 2020-08-25 ENCOUNTER — OFFICE VISIT (OUTPATIENT)
Dept: FAMILY MEDICINE CLINIC | Facility: CLINIC | Age: 75
End: 2020-08-25
Payer: MEDICARE

## 2020-08-25 VITALS
WEIGHT: 180 LBS | SYSTOLIC BLOOD PRESSURE: 140 MMHG | OXYGEN SATURATION: 97 % | DIASTOLIC BLOOD PRESSURE: 70 MMHG | HEIGHT: 70 IN | BODY MASS INDEX: 25.77 KG/M2 | TEMPERATURE: 97.3 F | RESPIRATION RATE: 18 BRPM | HEART RATE: 50 BPM

## 2020-08-25 DIAGNOSIS — R42 DIZZINESS: Primary | ICD-10-CM

## 2020-08-25 DIAGNOSIS — R00.1 BRADYCARDIA BY ELECTROCARDIOGRAM: ICD-10-CM

## 2020-08-25 PROCEDURE — 3078F DIAST BP <80 MM HG: CPT | Performed by: NURSE PRACTITIONER

## 2020-08-25 PROCEDURE — 93000 ELECTROCARDIOGRAM COMPLETE: CPT | Performed by: NURSE PRACTITIONER

## 2020-08-25 PROCEDURE — 1160F RVW MEDS BY RX/DR IN RCRD: CPT | Performed by: NURSE PRACTITIONER

## 2020-08-25 PROCEDURE — 3044F HG A1C LEVEL LT 7.0%: CPT | Performed by: NURSE PRACTITIONER

## 2020-08-25 PROCEDURE — 3077F SYST BP >= 140 MM HG: CPT | Performed by: NURSE PRACTITIONER

## 2020-08-25 PROCEDURE — 2022F DILAT RTA XM EVC RTNOPTHY: CPT | Performed by: NURSE PRACTITIONER

## 2020-08-25 PROCEDURE — 99213 OFFICE O/P EST LOW 20 MIN: CPT | Performed by: NURSE PRACTITIONER

## 2020-08-25 PROCEDURE — 3008F BODY MASS INDEX DOCD: CPT | Performed by: NURSE PRACTITIONER

## 2020-08-25 PROCEDURE — 4040F PNEUMOC VAC/ADMIN/RCVD: CPT | Performed by: NURSE PRACTITIONER

## 2020-08-25 NOTE — PROGRESS NOTES
Assessment/Plan:  Possible worsening AS with rate complication  Discussion with pt stable enough to avoid ED  Was able to get appt with his cardiology group for tomorrow  Advised pt if symptoms significantly worsen seek emergency care  Pt verbalized agreement and understanding of the plan of care as outlined during his visit  No problem-specific Assessment & Plan notes found for this encounter  Problem List Items Addressed This Visit     None      Visit Diagnoses     Dizziness    -  Primary    Relevant Orders    POCT ECG    Ambulatory referral to Cardiology    Bradycardia by electrocardiogram                Subjective:      Patient ID: Rizwana Johnson  is a 66 y o  male  Patient has been feeling dizzy for a couple of month  Lightlessness not related to head position  Has been also having HA off and on for couple weeks  No change LOC but having foggy thought speed  Denies any F,N,V,D or SOB  But significant reduction in stamina  The following portions of the patient's history were reviewed and updated as appropriate: allergies, current medications, past family history, past medical history, past social history, past surgical history and problem list     Review of Systems   Constitutional: Negative for appetite change and fever  HENT: Negative for congestion and trouble swallowing  Respiratory: Negative for shortness of breath  Cardiovascular: Negative for chest pain  Gastrointestinal: Negative for abdominal pain  Genitourinary: Negative for difficulty urinating  Musculoskeletal: Negative for myalgias  Neurological: Positive for dizziness, weakness, light-headedness and headaches  Psychiatric/Behavioral: Positive for decreased concentration  The patient is not nervous/anxious            Objective:      /70 (BP Location: Left arm, Patient Position: Sitting, Cuff Size: Standard)   Pulse (!) 50   Temp (!) 97 3 °F (36 3 °C) (Temporal)   Resp 18   Ht 5' 10" (1 778 m) Wt 81 6 kg (180 lb)   SpO2 97%   BMI 25 83 kg/m²          Physical Exam  HENT:      Head: Normocephalic and atraumatic  Eyes:      Extraocular Movements: Extraocular movements intact  Pupils: Pupils are equal, round, and reactive to light  Cardiovascular:      Rate and Rhythm: Bradycardia present  Rhythm irregular  Pulmonary:      Breath sounds: Normal breath sounds  Neurological:      Mental Status: Mental status is at baseline        Comments: Gait a little unsteady

## 2020-08-26 ENCOUNTER — OFFICE VISIT (OUTPATIENT)
Dept: CARDIOLOGY CLINIC | Facility: CLINIC | Age: 75
End: 2020-08-26
Payer: MEDICARE

## 2020-08-26 VITALS
OXYGEN SATURATION: 96 % | SYSTOLIC BLOOD PRESSURE: 170 MMHG | HEIGHT: 70 IN | HEART RATE: 56 BPM | TEMPERATURE: 96.9 F | DIASTOLIC BLOOD PRESSURE: 48 MMHG | WEIGHT: 181.3 LBS | BODY MASS INDEX: 25.96 KG/M2

## 2020-08-26 DIAGNOSIS — R00.1 BRADYCARDIA: Primary | ICD-10-CM

## 2020-08-26 DIAGNOSIS — I35.0 SEVERE AORTIC STENOSIS: ICD-10-CM

## 2020-08-26 DIAGNOSIS — I65.23 BILATERAL CAROTID ARTERY STENOSIS: ICD-10-CM

## 2020-08-26 DIAGNOSIS — I10 HYPERTENSION, UNSPECIFIED TYPE: ICD-10-CM

## 2020-08-26 DIAGNOSIS — E78.5 DYSLIPIDEMIA: ICD-10-CM

## 2020-08-26 PROCEDURE — 3044F HG A1C LEVEL LT 7.0%: CPT | Performed by: NURSE PRACTITIONER

## 2020-08-26 PROCEDURE — 3077F SYST BP >= 140 MM HG: CPT | Performed by: NURSE PRACTITIONER

## 2020-08-26 PROCEDURE — 99214 OFFICE O/P EST MOD 30 MIN: CPT | Performed by: NURSE PRACTITIONER

## 2020-08-26 PROCEDURE — 1160F RVW MEDS BY RX/DR IN RCRD: CPT | Performed by: NURSE PRACTITIONER

## 2020-08-26 PROCEDURE — 4040F PNEUMOC VAC/ADMIN/RCVD: CPT | Performed by: NURSE PRACTITIONER

## 2020-08-26 PROCEDURE — 3078F DIAST BP <80 MM HG: CPT | Performed by: NURSE PRACTITIONER

## 2020-08-26 PROCEDURE — 2022F DILAT RTA XM EVC RTNOPTHY: CPT | Performed by: NURSE PRACTITIONER

## 2020-08-26 PROCEDURE — 93000 ELECTROCARDIOGRAM COMPLETE: CPT | Performed by: NURSE PRACTITIONER

## 2020-08-26 PROCEDURE — 3008F BODY MASS INDEX DOCD: CPT | Performed by: NURSE PRACTITIONER

## 2020-08-26 RX ORDER — HYDRALAZINE HYDROCHLORIDE 50 MG/1
50 TABLET, FILM COATED ORAL 2 TIMES DAILY
Qty: 60 TABLET | Refills: 3 | Status: SHIPPED | OUTPATIENT
Start: 2020-08-26 | End: 2020-08-28 | Stop reason: SDUPTHER

## 2020-08-26 NOTE — PROGRESS NOTES
Cardiology Follow Up    Jesus Oliva   1945  [de-identified]  [facility]  [address] [facility] [address]  [address] [id number] [phone]    1  Dizziness  Ambulatory referral to Cardiology       Interval History:   Mr Tawny Baugh was seen by his PCP yesterday  EKG Showed SB rate 40 BPM   He was instructed to follow up with Cardiology  Mr Tawny Baugh presents to our office for an acute visit due to low HR  He is accompanied by his wife  Mr Tomas Edwards admits to lightheadedness, dizziness and feeling foggy for the past couple of months  He has been experiencing worsening headaches for the pst week  His BP has been elevated  He does not monitor his BP at home  He denies CP, palpitations, dyspnea with minimal or moderate exertion        HPI:  CAD  CABG  HTN  Severe pamela carotid disease  Dyslipidemia 1/23/20 , TG 81, HDL 36, LDL 52  PAD sp LLE revascularization  5/28/20 TTE VLEF 60%, Grade 2 DD, mild MR, mild Aortic regurgitation, severe AS LUIS 0 79cm2  Patient Active Problem List   Diagnosis    Atherosclerosis of native arteries of extremities with intermittent claudication, bilateral legs (HCC)    PAD (peripheral artery disease) (HCC)    Stenosis of noncoronary bypass graft ([facility] )    DM (diabetes mellitus) with peripheral vascular complication ([facility] )    Asymptomatic bilateral carotid artery stenosis    S/P CABG (coronary artery bypass graft)    Essential hypertension    Aorto-iliac disease ([address] )    Renal artery stenosis, native, bilateral ([address] )    Dyslipidemia    Multiple vessel coronary artery disease    Progressive angina (HCC)    Tension headache    Cigarette nicotine dependence with nicotine-induced disorder    Calcific tendinitis of right shoulder region    Right shoulder pain    Nonrheumatic aortic valve stenosis     Past Medical History:   Diagnosis Date    Arteriosclerosis of arteries of extremities ([facility] )     CAD (coronary artery disease)     Diabetes ([facility] )     Dyslipidemia     Endocrine pancreas disorder     GERD (gastroesophageal reflux disease)     Hyperlipidemia     Hypertension     Kidney stones     [facility]peripheral [facility] disease) [facility])      Social History     Socioeconomic History    Marital status:      Spouse name: Not on file    Number of children: Not on file    Years of education: Not on file    Highest education level: Not on file   Occupational History    Not on file   Social Needs    Financial resource strain: Not on file    Food insecurity     Worry: Not on file     Inability: Not on file    Transportation needs     Medical: Not on file     Non-medical: Not on file   Tobacco Use    Smoking status: Current Every Day Smoker     Packs/day: 1 00     Years: 50 00     Pack years: 50 00     Types: Cigarettes    Smokeless tobacco: Never Used   Substance and Sexual Activity    Alcohol use: Yes     Comment: occasional    Drug use: No    Sexual activity: Not on file   Lifestyle    Physical activity     Days per week: Not on file     Minutes per session: Not on file    Stress: Not on file   Relationships    Social connections     Talks on phone: Not on file     Gets together: Not on file     Attends Rastafari service: Not on file     Active member of club or organization: Not on file     Attends meetings of clubs or organizations: Not on file     Relationship status: Not on file    Intimate partner violence     Fear of current or ex partner: Not on file     Emotionally abused: Not on file     Physically abused: Not on file     Forced sexual activity: Not on file   Other Topics Concern    Not on file   Social History Narrative    · Most recent tobacco use screenin2018      · Do you currently or have you served in [employer] [facility]:    Yes      · If Yes, What branch of service:   [employer]      · Occupation:   Dentists      · Exercise level:   Occasional · Caffeine intake:   Heavy      · Marital status:         · Diet:   Regular  low fat     · Seat belts used routinely:   Yes      · Smoke alarm in home: Yes      · Advance directive:    Yes      · General stress level:   Low       Family History   Problem Relation Age of Onset    Heart attack Father     Other Sister         bypass and vlave replacement    Stroke Paternal Uncle     Arrhythmia Neg Hx     Asthma Neg Hx     Clotting disorder Neg Hx     Fainting Neg Hx     Anuerysm Neg Hx     Hypertension Neg Hx         unsure     Hyperlipidemia Neg Hx     Heart failure Neg Hx      Past Surgical History:   Procedure Laterality Date    APPENDECTOMY      COLECTOMY      COLONOSCOPY  2013    CORONARY ARTERY BYPASS GRAFT  2013    X 2    FEMORAL ARTERY - POPLITEAL ARTERY BYPASS GRAFT      HEMORRHOID SURGERY      IR AORTAGRAM WITH RUN-OFF  11/19/2018    OK SLCTV CATHJ 3RD+ ORD SLCTV ABDL PEL/LXTR [facility] Left 8/12/2016    Procedure: LEFT FEMORAL ARTERIOGRAM; BALLOON ANGIOPLASTY; SFA  AND FEMORAL AT VEIN GRAFT;  Surgeon: Shanthi Lopez MD;  [facility];  Service: Vascular    TONSILLECTOMY AND ADENOIDECTOMY         Current Outpatient Medications:     amLODIPine (NORVASC) 10 mg tablet, Take 1 tablet (10 mg total) by mouth daily, Disp: 90 tablet, Rfl: 3    AMYLASE-LIPASE-PROTEASE PO, Take by mouth , Disp: , Rfl:     aspirin (ECOTRIN LOW STRENGTH) 81 mg EC tablet, Take 81 mg by mouth daily, Disp: , Rfl:     atorvastatin (LIPITOR) 80 mg tablet, Take 1 tablet (80 mg total) by mouth daily at bedtime, Disp: 90 tablet, Rfl: 3    benazepril (LOTENSIN) 40 MG tablet, Take 1 tablet (40 mg total) by mouth daily, Disp: 90 tablet, Rfl: 3    clopidogrel (PLAVIX) 75 mg tablet, Take 1 tablet (75 mg total) by mouth daily, Disp: 90 tablet, Rfl: 3    Cyanocobalamin (VITAMIN B12 PO), Take by mouth, Disp: , Rfl:     Magnesium Oxide (MAG-200 PO), Take by mouth, Disp: , Rfl:     metFORMIN (GLUCOPHAGE) 500 mg tablet, TAKE 2 TABLETS TWICE A DAY, Disp: 360 tablet, Rfl: 1    metoprolol tartrate (LOPRESSOR) 25 mg tablet, Take 1 tablet (25 mg total) by mouth 2 (two) times a day, Disp: 180 tablet, Rfl: 3    Multiple Vitamin (MULTIVITAMIN) tablet, Take 1 tablet by mouth daily  , Disp: , Rfl:     pantoprazole (PROTONIX) 40 mg tablet, 40 mg daily , Disp: , Rfl:   No Known Allergies    Labs:  Orders Only on 08/20/2020   Component Date Value    Right Eye Diabetic Retin* 08/20/2020 None     Left Eye Diabetic Retino* 08/20/2020 None      Imaging: No results found  Review of Systems:  Review of Systems   Constitutional: Positive for fatigue  Musculoskeletal: Positive for gait problem  Neurological: Positive for dizziness, light-headedness and headaches  All other systems reviewed and are negative  Physical Exam:  Physical Exam  Constitutional:       Appearance: Normal appearance  HENT:      Head: Normocephalic  Neck:      Musculoskeletal: Normal range of motion  Cardiovascular:      Rate and Rhythm: Bradycardia present  Heart sounds: Murmur present  Comments: 5/6 AKOSUA  Pulmonary:      Effort: Pulmonary effort is normal       Breath sounds: Normal breath sounds  Abdominal:      General: Abdomen is flat  Bowel sounds are normal    Musculoskeletal: Normal range of motion  General: No swelling  Skin:     Capillary Refill: Capillary refill takes less than 2 seconds  Neurological:      General: No focal deficit present  Mental Status: He is alert and oriented to person, place, and time  Psychiatric:         Mood and Affect: Mood normal          Discussion/Summary:  1  Bradycardia- EKG shows HR 45 BPM, possible Wenckebach type I, stop Metoprolol tartrate, 48 Hour Holter monitor evaluate rate and rhythm  2  Severe AS LUIS 0 79cm2- ambulatory referral to Cardiothoracic surgery   3  Hypertension RUE sitting 160/90, stop BB due to bradycardia    Start Hydralazine 50mg BID, continue on Benazepril 40mg daily and Amlodipine 10mg daily  Instructed on home BP monitoring and to keep a log    4  Severe pamela carotid disease followed by vascular surgery continue on ASA 81mg daily and Lipitor 80mg daily   5   Dyslipidemia 1/23/20 , TG 81, HDL 36, LDL 52- continues on Lipitor 80mg daily

## 2020-08-26 NOTE — PATIENT INSTRUCTIONS
2gm sodium low fat low cholesterol diet, eating fresh is best  Stop Metoprolol tartrate tonight   Start Hydralazine 50mg BID, start Hydralazine tonight   Home BP monitoring on hour after taking Am medications, [employer] BP monitor, keep a log    48 hour Holter monitor  Follow up with Cardiothoracic surgery for severe AS

## 2020-08-28 DIAGNOSIS — I10 HYPERTENSION, UNSPECIFIED TYPE: ICD-10-CM

## 2020-08-28 DIAGNOSIS — R00.1 BRADYCARDIA: ICD-10-CM

## 2020-08-28 DIAGNOSIS — I25.10 CORONARY ARTERY DISEASE INVOLVING NATIVE HEART WITHOUT ANGINA PECTORIS, UNSPECIFIED VESSEL OR LESION TYPE: ICD-10-CM

## 2020-08-28 RX ORDER — CLOPIDOGREL BISULFATE 75 MG/1
75 TABLET ORAL DAILY
Qty: 90 TABLET | Refills: 3 | Status: SHIPPED | OUTPATIENT
Start: 2020-08-28 | End: 2021-11-16

## 2020-08-28 RX ORDER — HYDRALAZINE HYDROCHLORIDE 50 MG/1
50 TABLET, FILM COATED ORAL 2 TIMES DAILY
Qty: 180 TABLET | Refills: 3 | Status: SHIPPED | OUTPATIENT
Start: 2020-08-28 | End: 2020-09-04 | Stop reason: SDUPTHER

## 2020-08-31 ENCOUNTER — PROCEDURE VISIT (OUTPATIENT)
Dept: CARDIOLOGY CLINIC | Facility: MEDICAL CENTER | Age: 75
End: 2020-08-31
Payer: MEDICARE

## 2020-08-31 DIAGNOSIS — R00.1 BRADYCARDIA: Primary | ICD-10-CM

## 2020-08-31 PROCEDURE — 99211 OFF/OP EST MAY X REQ PHY/QHP: CPT

## 2020-09-03 ENCOUNTER — HOSPITAL ENCOUNTER (OUTPATIENT)
Dept: NON INVASIVE DIAGNOSTICS | Facility: CLINIC | Age: 75
Discharge: HOME | End: 2020-09-03
Payer: MEDICARE

## 2020-09-03 DIAGNOSIS — R00.1 BRADYCARDIA: ICD-10-CM

## 2020-09-03 PROCEDURE — 93225 XTRNL ECG REC<48 HRS REC: CPT

## 2020-09-03 PROCEDURE — 93226 XTRNL ECG REC<48 HR SCAN A/R: CPT

## 2020-09-04 ENCOUNTER — OFFICE VISIT (OUTPATIENT)
Dept: CARDIAC SURGERY | Facility: CLINIC | Age: 75
End: 2020-09-04
Payer: MEDICARE

## 2020-09-04 VITALS
TEMPERATURE: 97.4 F | HEIGHT: 70 IN | SYSTOLIC BLOOD PRESSURE: 162 MMHG | DIASTOLIC BLOOD PRESSURE: 54 MMHG | BODY MASS INDEX: 26.27 KG/M2 | RESPIRATION RATE: 18 BRPM | OXYGEN SATURATION: 98 % | HEART RATE: 56 BPM | WEIGHT: 183.5 LBS

## 2020-09-04 DIAGNOSIS — I65.23 ASYMPTOMATIC BILATERAL CAROTID ARTERY STENOSIS: Chronic | ICD-10-CM

## 2020-09-04 DIAGNOSIS — I35.0 NONRHEUMATIC AORTIC VALVE STENOSIS: Primary | ICD-10-CM

## 2020-09-04 DIAGNOSIS — R00.1 BRADYCARDIA: ICD-10-CM

## 2020-09-04 DIAGNOSIS — Z95.1 S/P CABG (CORONARY ARTERY BYPASS GRAFT): Chronic | ICD-10-CM

## 2020-09-04 DIAGNOSIS — I10 HYPERTENSION, UNSPECIFIED TYPE: ICD-10-CM

## 2020-09-04 PROCEDURE — 99204 OFFICE O/P NEW MOD 45 MIN: CPT | Performed by: PHYSICIAN ASSISTANT

## 2020-09-04 RX ORDER — HYDRALAZINE HYDROCHLORIDE 50 MG/1
50 TABLET, FILM COATED ORAL 2 TIMES DAILY
Qty: 180 TABLET | Refills: 3 | Status: SHIPPED | OUTPATIENT
Start: 2020-09-04 | End: 2021-11-16

## 2020-09-04 NOTE — PROGRESS NOTES
Consultation - Cardiothoracic Surgery   Jesus Oliva  66 y o  male MRN: [id number]    Physician Requesting Consult: Bobo Morrow    Reason for Consult / Principal Problem: Aortic stenosis, Non-Rheumatic    History of Present Illness: Jesus Oliva  is a 66y o  year old male with a past medical history notable for s/p CABG x 2 9/12/2013, HTN, HL, bilateral carotid artery disease, PAD, aorto-iliac disease with claudication, multiple LE revascularization procedures, CKD III and DM who presents to the office today after referral from cardiology for worsening aortic stenosis  The patient has had complaints of dizziness without syncope for the past few months  He denies any chest pain, SOB, DEMARCO or palpitations  He was recently placed on a holter monitor by cardiology for bradycardia  The patient unfortunately despite his cardiovascular disease continues to smoke about 1 ppd  The patient lives alone    He is a retired dentist      Past Medical History:  Past Medical History:   Diagnosis Date    Arteriosclerosis of arteries of extremities ([address] )     CAD (coronary artery disease)     Diabetes ([facility] )     Dyslipidemia     Endocrine pancreas disorder     GERD (gastroesophageal reflux disease)     Hyperlipidemia     Hypertension     Kidney stones     PAD (peripheral artery disease) ([facility] [facility] )      Past Surgical History:   Past Surgical History:   Procedure Laterality Date    APPENDECTOMY      COLECTOMY      COLONOSCOPY  2013    CORONARY ARTERY BYPASS GRAFT  2013    X 2    FEMORAL ARTERY - POPLITEAL ARTERY BYPASS GRAFT      HEMORRHOID SURGERY      IR AORTAGRAM WITH RUN-OFF  11/19/2018    [place] SLCTV CATHJ 3RD+ ORD SLCTV ABDL PEL/LXTR [facility] Left 8/12/2016    Procedure: LEFT FEMORAL ARTERIOGRAM; BALLOON ANGIOPLASTY; SFA  AND FEMORAL AT VEIN GRAFT;  Surgeon: Darryle Hammersmith, MD;  [facility];  Service: Vascular    TONSILLECTOMY AND ADENOIDECTOMY       Family History:  Family History   Problem Relation Age of Onset    Heart attack Father     Other Sister         bypass and vlave replacement    Stroke Paternal Uncle     Arrhythmia Neg Hx     Asthma Neg Hx     Clotting disorder Neg Hx     Fainting Neg Hx     Anuerysm Neg Hx     Hypertension Neg Hx         unsure     Hyperlipidemia Neg Hx     Heart failure Neg Hx      Social History:    Social History     Substance and Sexual Activity   Alcohol Use Yes    Comment: occasional     Social History     Substance and Sexual Activity   Drug Use No     Social History     Tobacco Use   Smoking Status Current Every Day Smoker    Packs/day: 1 00    Years: 50 00    Pack years: 50 00    Types: Cigarettes   Smokeless Tobacco Never Used       Home Medications:   Prior to Admission medications    Medication Sig Start Date End Date Taking? Authorizing Provider   amLODIPine (NORVASC) 10 mg tablet Take 1 tablet (10 mg total) by mouth daily 1/20/20  Yes Kylie Gunter MD   AMYLASE-LIPASE-PROTEASE PO Take by mouth    Yes Historical Provider, MD   aspirin (ECOTRIN LOW STRENGTH) 81 mg EC tablet Take 81 mg by mouth daily   Yes Historical Provider, MD   atorvastatin (LIPITOR) 80 mg tablet Take 1 tablet (80 mg total) by mouth daily at bedtime 1/20/20  Yes Kylie Gunter MD   benazepril (LOTENSIN) 40 MG tablet Take 1 tablet (40 mg total) by mouth daily 1/20/20  Yes Kylie Gunter MD   clopidogrel (PLAVIX) 75 mg tablet Take 1 tablet (75 mg total) by mouth daily 8/28/20  Yes Musa Cannon MD   Cyanocobalamin (VITAMIN B12 PO) Take by mouth   Yes Historical Provider, MD   hydrALAZINE (APRESOLINE) 50 mg tablet Take 1 tablet (50 mg total) by mouth 2 (two) times a day 8/28/20  Yes Musa Cannon MD   Magnesium Oxide (MAG-200 PO) Take by mouth   Yes Historical Provider, MD   metFORMIN (GLUCOPHAGE) 500 mg tablet TAKE 2 TABLETS TWICE A DAY 7/8/20  Yes Charito Campbell MD   Multiple Vitamin (MULTIVITAMIN) tablet Take 1 tablet by mouth daily     Yes Historical Provider, MD pantoprazole (PROTONIX) 40 mg tablet 40 mg daily  4/20/19  Yes Historical Provider, MD       Allergies:  No Known Allergies    Review of Systems:  Review of Systems   Constitutional: Positive for activity change  HENT: Negative  Eyes: Negative  Respiratory: Negative  Cardiovascular: Negative  Gastrointestinal: Negative  Endocrine: Negative  Genitourinary: Negative  Musculoskeletal: Negative  Skin: Negative  Allergic/Immunologic: Negative  Neurological: Positive for dizziness, weakness, light-headedness and numbness  Hematological: Negative  Psychiatric/Behavioral: Negative  Vital Signs:     Vitals:    09/04/20 1315 09/04/20 1320   BP: 158/56 162/54   BP Location: Left leg Right arm   Patient Position: Sitting Sitting   Cuff Size: Standard Standard   Pulse: 56    Resp: 18    Temp: (!) 97 4 °F (36 3 °C)    TempSrc: Tympanic    SpO2: 98%    Weight: 83 2 kg (183 lb 8 oz)    Height: 5' 10" (1 778 m)        Physical Exam:  Physical Exam  Constitutional:       General: He is not in acute distress  Appearance: Normal appearance  He is normal weight  HENT:      Head: Normocephalic and atraumatic  Right Ear: External ear normal       Left Ear: External ear normal       Nose: Nose normal       Mouth/Throat:      Mouth: Mucous membranes are moist       Pharynx: Oropharynx is clear  Eyes:      Conjunctiva/sclera: Conjunctivae normal       Pupils: Pupils are equal, round, and reactive to light  Neck:      Musculoskeletal: Normal range of motion and neck supple  Cardiovascular:      Rate and Rhythm: Normal rate  Heart sounds: Murmur present  Comments: III/VI systolic murmur, rate is regular, positive carotid bruits bilaterally  Pulmonary:      Effort: Pulmonary effort is normal       Breath sounds: Normal breath sounds  Abdominal:      General: Bowel sounds are normal       Palpations: Abdomen is soft  Musculoskeletal: Normal range of motion  General: No swelling, tenderness, deformity or signs of injury  Skin:     General: Skin is warm and dry  Neurological:      General: No focal deficit present  Mental Status: He is alert and oriented to person, place, and time  Psychiatric:         Mood and Affect: Mood normal          Behavior: Behavior normal          Thought Content: Thought content normal          Judgment: Judgment normal        Imaging Studies:     Echocardiogram:   LEFT VENTRICLE: Size was normal  Systolic function was normal by visual assessment  Ejection fraction was estimated to be 60 %  There were no regional wall motion abnormalities  Wall thickness was mildly to moderately increased  DOPPLER:  The ratio of early ventricular filling to atrial contraction velocities was within the normal range  Features were consistent with a pseudonormal left ventricular filling pattern, with concomitant abnormal relaxation and increased filling  pressure (grade 2 diastolic dysfunction)      RIGHT VENTRICLE: The size was normal  Systolic function was normal  DOPPLER: Systolic pressure was not estimated      LEFT ATRIUM: The atrium was mildly dilated      RIGHT ATRIUM: The atrium was mildly dilated      MITRAL VALVE: There was mild annular calcification  Valve structure was normal  There was mild calcification  There was normal leaflet separation  DOPPLER: The transmitral velocity was within the normal range  There was no evidence for  stenosis  There was mild regurgitation      AORTIC VALVE: The valve was trileaflet  Leaflets exhibited normal thickness, marked calcification, and markedly reduced cuspal separation  DOPPLER: Transaortic velocity was increased due to valvular stenosis  There was severe stenosis  There was mild regurgitation      TRICUSPID VALVE: The valve structure was normal  There was normal leaflet separation  DOPPLER: The transtricuspid velocity was within the normal range  There was no evidence for stenosis   There was mild regurgitation      PULMONIC VALVE: Leaflets exhibited normal thickness, no calcification, and normal cuspal separation  DOPPLER: The transpulmonic velocity was within the normal range  There was mild regurgitation      PERICARDIUM: There was no pericardial effusion      AORTA: The root exhibited normal size      SYSTEMIC VEINS: IVC: The inferior vena cava was dilated  I have personally reviewed pertinent reports  Assessment:  Patient Active Problem List    Diagnosis Date Noted    Nonrheumatic aortic valve stenosis 11/27/2019    Calcific tendinitis of right shoulder region 06/17/2019    Right shoulder pain 06/17/2019    Cigarette nicotine dependence with nicotine-induced disorder 10/22/2018    Stenosis of noncoronary bypass graft ([address] ) 08/12/2016    DM (diabetes mellitus) with peripheral vascular complication ([address] ) 40/63/0246    Asymptomatic bilateral carotid artery stenosis 08/12/2016    S/P CABG (coronary artery bypass graft) 08/12/2016    Essential hypertension 08/12/2016    Aorto-iliac disease ([address] ) 08/12/2016    Renal artery stenosis, native, bilateral ([address] ) 08/12/2016    Dyslipidemia 08/12/2016    Multiple vessel coronary artery disease 08/12/2016    Progressive angina ([address] ) 08/12/2016    Atherosclerosis of native arteries of extremities with intermittent claudication, bilateral legs ([address] )     PAD (peripheral artery disease) ([address] )     Tension headache 10/24/2013     Severe aortic stenosis; Ongoing TAVR workup    Plan:  TAVR testing to be determined per the patient decisions  Cecelia Alvarado  was comfortable with our recommendations, and their questions were answered to their satisfaction  Thank you for allowing us to participate in the care of this patient       SIGNATURE: Erica Frank  DATE: September 4, 2020  TIME: 1:51 PM

## 2020-09-04 NOTE — LETTER
September 4, 2020     Clementina Almanza, [address]  [address] [address]    Patient: Haley Bains  YOB: 1945   Date of Visit: 9/4/2020       Dear Dr Lynnwood Koyanagi:    Thank you for referring Kelli Ruby to me for evaluation  Below are my notes for this consultation  If you have questions, please do not hesitate to call me  I look forward to following your patient along with you  Sincerely,        Andrew Guillen MD        CC: MD Harriet Mckeon MD Ferdie Skinner, MD Wayland Rosenthal, PA-C  9/4/2020  2:26 PM  Attested  Consultation - Cardiothoracic Surgery   Haley Bains  66 y o  male MRN: [id number]    Physician Requesting Consult: Debo Montilla    Reason for Consult / Principal Problem: Aortic stenosis, Non-Rheumatic    History of Present Illness: Haley Bains  is a 66y o  year old male with a past medical history notable for s/p CABG x 2 9/12/2013, HTN, HL, bilateral carotid artery disease, PAD, aorto-iliac disease with claudication, multiple LE revascularization procedures, CKD III and DM who presents to the office today after referral from cardiology for worsening aortic stenosis  The patient has had complaints of dizziness without syncope for the past few months  He denies any chest pain, SOB, DEMARCO or palpitations  He was recently placed on a holter monitor by cardiology for bradycardia  The patient unfortunately despite his cardiovascular disease continues to smoke about 1 ppd  The patient lives alone    He is a retired dentist      Past Medical History:  Past Medical History:   Diagnosis Date    Arteriosclerosis of arteries of extremities ([address] )     CAD (coronary artery disease)     Diabetes ([address] )     Dyslipidemia     Endocrine pancreas disorder     GERD (gastroesophageal reflux disease)     Hyperlipidemia     Hypertension     Kidney stones     [facility]AD [facility]peripheral artery [facility]) [facility])      Past Surgical History:   Past Surgical History:   Procedure Laterality Date    APPENDECTOMY      COLECTOMY      COLONOSCOPY  2013    CORONARY ARTERY BYPASS GRAFT  2013    X 2    FEMORAL ARTERY - POPLITEAL ARTERY BYPASS GRAFT      HEMORRHOID SURGERY      IR AORTAGRAM WITH RUN-OFF  11/19/2018    NC SLCTV CATHJ 3RD+ ORD SLCTV ABDL PEL/LXTR [facility] Left 8/12/2016    Procedure: LEFT FEMORAL ARTERIOGRAM; BALLOON ANGIOPLASTY; SFA  AND FEMORAL AT VEIN GRAFT;  Surgeon: Jhonatan Bazan MD;  Location: [facility];  Service: Vascular    TONSILLECTOMY AND ADENOIDECTOMY       Family History:  Family History   Problem Relation Age of Onset    Heart attack Father     Other Sister         bypass and vlave replacement    Stroke Paternal Uncle     Arrhythmia Neg Hx     Asthma Neg Hx     Clotting disorder Neg Hx     Fainting Neg Hx     Anuerysm Neg Hx     Hypertension Neg Hx         unsure     Hyperlipidemia Neg Hx     Heart failure Neg Hx      Social History:    Social History     Substance and Sexual Activity   Alcohol Use Yes    Comment: occasional     Social History     Substance and Sexual Activity   Drug Use No     Social History     Tobacco Use   Smoking Status Current Every Day Smoker    Packs/day: 1 00    Years: 50 00    Pack years: 50 00    Types: Cigarettes   Smokeless Tobacco Never Used       Home Medications:   Prior to Admission medications    Medication Sig Start Date End Date Taking?  Authorizing Provider   amLODIPine (NORVASC) 10 mg tablet Take 1 tablet (10 mg total) by mouth daily 1/20/20  Yes Fidelina Chavarria MD   AMYLASE-LIPASE-PROTEASE PO Take by mouth    Yes Historical Provider, MD   aspirin (ECOTRIN LOW STRENGTH) 81 mg EC tablet Take 81 mg by mouth daily   Yes Historical Provider, MD   atorvastatin (LIPITOR) 80 mg tablet Take 1 tablet (80 mg total) by mouth daily at bedtime 1/20/20  Yes Fidelina Chavarria MD   benazepril (LOTENSIN) 40 MG tablet Take 1 tablet (40 mg total) by mouth daily 1/20/20  Yes [employer] MD Laura   clopidogrel (PLAVIX) 75 mg tablet Take 1 tablet (75 mg total) by mouth daily 8/28/20  Yes Lester Lafleur MD   Cyanocobalamin (VITAMIN B12 PO) Take by mouth   Yes Historical Provider, MD   hydrALAZINE (APRESOLINE) 50 mg tablet Take 1 tablet (50 mg total) by mouth 2 (two) times a day 8/28/20  Yes Lester Lafleur MD   Magnesium Oxide (MAG-200 PO) Take by mouth   Yes Historical Provider, MD   metFORMIN (GLUCOPHAGE) 500 mg tablet TAKE 2 TABLETS TWICE A DAY 7/8/20  Yes Amado Phipps MD   Multiple Vitamin (MULTIVITAMIN) tablet Take 1 tablet by mouth daily  Yes Historical Provider, MD   pantoprazole (PROTONIX) 40 mg tablet 40 mg daily  4/20/19  Yes Historical Provider, MD       Allergies:  No Known Allergies    Review of Systems:  Review of Systems   Constitutional: Positive for activity change  HENT: Negative  Eyes: Negative  Respiratory: Negative  Cardiovascular: Negative  Gastrointestinal: Negative  Endocrine: Negative  Genitourinary: Negative  Musculoskeletal: Negative  Skin: Negative  Allergic/Immunologic: Negative  Neurological: Positive for dizziness, weakness, light-headedness and numbness  Hematological: Negative  Psychiatric/Behavioral: Negative  Vital Signs:     Vitals:    09/04/20 1315 09/04/20 1320   BP: 158/56 162/54   BP Location: Left leg Right arm   Patient Position: Sitting Sitting   Cuff Size: Standard Standard   Pulse: 56    Resp: 18    Temp: (!) 97 4 °F (36 3 °C)    TempSrc: Tympanic    SpO2: 98%    Weight: 83 2 kg (183 lb 8 oz)    Height: 5' 10" (1 778 m)        Physical Exam:  Physical Exam  Constitutional:       General: He is not in acute distress  Appearance: Normal appearance  He is normal weight  HENT:      Head: Normocephalic and atraumatic        Right Ear: External ear normal       Left Ear: External ear normal       Nose: Nose normal       Mouth/Throat:      Mouth: Mucous membranes are moist       Pharynx: Oropharynx is clear  Eyes:      Conjunctiva/sclera: Conjunctivae normal       Pupils: Pupils are equal, round, and reactive to light  Neck:      Musculoskeletal: Normal range of motion and neck supple  Cardiovascular:      Rate and Rhythm: Normal rate  Heart sounds: Murmur present  Comments: III/VI systolic murmur, rate is regular, positive carotid bruits bilaterally  Pulmonary:      Effort: Pulmonary effort is normal       Breath sounds: Normal breath sounds  Abdominal:      General: Bowel sounds are normal       Palpations: Abdomen is soft  Musculoskeletal: Normal range of motion  General: No swelling, tenderness, deformity or signs of injury  Skin:     General: Skin is warm and dry  Neurological:      General: No focal deficit present  Mental Status: He is alert and oriented to person, place, and time  Psychiatric:         Mood and Affect: Mood normal          Behavior: Behavior normal          Thought Content: Thought content normal          Judgment: Judgment normal        Imaging Studies:     Echocardiogram:   LEFT VENTRICLE: Size was normal  Systolic function was normal by visual assessment  Ejection fraction was estimated to be 60 %  There were no regional wall motion abnormalities  Wall thickness was mildly to moderately increased  DOPPLER:  The ratio of early ventricular filling to atrial contraction velocities was within the normal range  Features were consistent with a pseudonormal left ventricular filling pattern, with concomitant abnormal relaxation and increased filling  pressure (grade 2 diastolic dysfunction)      RIGHT VENTRICLE: The size was normal  Systolic function was normal  DOPPLER: Systolic pressure was not estimated      LEFT ATRIUM: The atrium was mildly dilated      RIGHT ATRIUM: The atrium was mildly dilated      MITRAL VALVE: There was mild annular calcification  Valve structure was normal  There was mild calcification   There was normal leaflet separation  DOPPLER: The transmitral velocity was within the normal range  There was no evidence for  stenosis  There was mild regurgitation      AORTIC VALVE: The valve was trileaflet  Leaflets exhibited normal thickness, marked calcification, and markedly reduced cuspal separation  DOPPLER: Transaortic velocity was increased due to valvular stenosis  There was severe stenosis  There was mild regurgitation      TRICUSPID VALVE: The valve structure was normal  There was normal leaflet separation  DOPPLER: The transtricuspid velocity was within the normal range  There was no evidence for stenosis  There was mild regurgitation      PULMONIC VALVE: Leaflets exhibited normal thickness, no calcification, and normal cuspal separation  DOPPLER: The transpulmonic velocity was within the normal range  There was mild regurgitation      PERICARDIUM: There was no pericardial effusion      AORTA: The root exhibited normal size      SYSTEMIC VEINS: IVC: The inferior vena cava was dilated  I have personally reviewed pertinent reports        Assessment:  Patient Active Problem List    Diagnosis Date Noted    Nonrheumatic aortic valve stenosis 11/27/2019    Calcific tendinitis of right shoulder region 06/17/2019    Right shoulder pain 06/17/2019    Cigarette nicotine dependence with nicotine-induced disorder 10/22/2018    Stenosis of noncoronary bypass graft ([address] ) 08/12/2016    DM (diabetes mellitus) with peripheral vascular complication ([facility] [address] ) 69/89/7133    Asymptomatic bilateral carotid artery stenosis 08/12/2016    S/P CABG (coronary artery bypass graft) 08/12/2016    Essential hypertension 08/12/2016    Aorto-iliac disease ([address] ) 08/12/2016    Renal artery stenosis, native, bilateral ([address] ) 08/12/2016    Dyslipidemia 08/12/2016    Multiple vessel coronary artery disease 08/12/2016    Progressive angina ([address] ) 08/12/2016    Atherosclerosis of native arteries of extremities with intermittent claudication, bilateral legs ([address] )     PAD (peripheral artery disease) (HCC)     Tension headache 10/24/2013     Severe aortic stenosis; Ongoing TAVR workup    Plan:  TAVR testing to be determined per the patient decisions  Jim Mcdermott  was comfortable with our recommendations, and their questions were answered to their satisfaction  Thank you for allowing us to participate in the care of this patient  SIGNATURE: Erica Tavera  DATE: September 4, 2020  TIME: 1:51 PM  Attestation signed by Ricky Nelson MD at 9/4/2020  2:33 PM:  Pt seen and examined with PA  I agree with the above assessment and plan  It was my pleasure to evaluate Jim Mcdermott  today for Severe Aortic Stenosis  He is referred for consideration of transcatheter aortic valve replacement  I reviewed all of the available testing and I had a lengthy discussion with the patient  He is s/p CABG x2 in 9/2013 performed by me  Unfortunately he has continued to smoke all this time and has had marked progression of bilateral carotid stenosis and peripheral vascular disease requiring multiple interventions  I did explain that his progressive vascular issues are related to smoking, but he denied that and expressed no desire or plan to quit or decrease smoking  He has multiple neurologic complaints including neck pain, headaches, problems with balance/walking, and dizziness  It is unclear how much of these could be attributed to aortic stenosis, and they certainly could be due to cerebrovascular disease  He is heavily preoccupied with these symptoms and really just wants them fixed  Unfortunately, it is unclear if TAVR will create the immediate relief he is looking for, therefore he has chosen to pursue evaluation of those symptoms with his PCP and Vascular surgeon prior to considering TAVR  I did advise him that we could begin TAVR evaluation concurrently but he does not wish to do so at this time         SIGNATURE: REFUGIO Chepe Levy MD  DATE: September 4, 2020  TIME: 2:27 PM

## 2020-09-08 PROCEDURE — 93227 XTRNL ECG REC<48 HR R&I: CPT | Performed by: INTERNAL MEDICINE

## 2020-09-09 ENCOUNTER — OFFICE VISIT (OUTPATIENT)
Dept: FAMILY MEDICINE CLINIC | Facility: CLINIC | Age: 75
End: 2020-09-09
Payer: MEDICARE

## 2020-09-09 VITALS
DIASTOLIC BLOOD PRESSURE: 60 MMHG | HEIGHT: 70 IN | RESPIRATION RATE: 16 BRPM | WEIGHT: 181 LBS | HEART RATE: 56 BPM | OXYGEN SATURATION: 96 % | TEMPERATURE: 97.6 F | SYSTOLIC BLOOD PRESSURE: 134 MMHG | BODY MASS INDEX: 25.91 KG/M2

## 2020-09-09 DIAGNOSIS — I70.45: ICD-10-CM

## 2020-09-09 DIAGNOSIS — I70.213 ATHEROSCLEROSIS OF NATIVE ARTERIES OF EXTREMITIES WITH INTERMITTENT CLAUDICATION, BILATERAL LEGS (HCC): ICD-10-CM

## 2020-09-09 DIAGNOSIS — I73.9 PAD (PERIPHERAL ARTERY DISEASE) (HCC): ICD-10-CM

## 2020-09-09 DIAGNOSIS — E78.5 DYSLIPIDEMIA: Chronic | ICD-10-CM

## 2020-09-09 DIAGNOSIS — I25.10 MULTIPLE VESSEL CORONARY ARTERY DISEASE: ICD-10-CM

## 2020-09-09 DIAGNOSIS — I10 ESSENTIAL HYPERTENSION: Chronic | ICD-10-CM

## 2020-09-09 DIAGNOSIS — F17.219 CIGARETTE NICOTINE DEPENDENCE WITH NICOTINE-INDUCED DISORDER: ICD-10-CM

## 2020-09-09 DIAGNOSIS — E11.51 DM (DIABETES MELLITUS) WITH PERIPHERAL VASCULAR COMPLICATION (HCC): Primary | Chronic | ICD-10-CM

## 2020-09-09 PROCEDURE — 99214 OFFICE O/P EST MOD 30 MIN: CPT | Performed by: FAMILY MEDICINE

## 2020-09-09 NOTE — PROGRESS NOTES
BMI Counseling: Body mass index is 25 97 kg/m²  The BMI is above normal  Nutrition recommendations include decreasing portion sizes, encouraging healthy choices of fruits and vegetables, decreasing fast food intake, limiting drinks that contain sugar, moderation in carbohydrate intake, increasing intake of lean protein, reducing intake of saturated and trans fat and reducing intake of cholesterol  Exercise recommendations include exercising 3-5 times per week  No pharmacotherapy was ordered  Patient referred to PCP due to patient being overweight  Assessment/Plan:      Diagnoses and all orders for this visit:    DM (diabetes mellitus) with peripheral vascular complication ([facility] )    PAD (peripheral artery disease) ([facility] )    Multiple vessel coronary artery disease    Essential hypertension    Atherosclerosis of native arteries of extremities with intermittent claudication, bilateral legs (HCC)    Cigarette nicotine dependence with nicotine-induced disorder    Dyslipidemia          Subjective:     Patient ID: Desahwn Manning  is a 66 y o  male  Pt here for cekup on neck pain CAD, lipids, NIDDM, PVD  Pt also  With increased  PVD and  Aortic  Stenosis  States the cardiolology  Doctor  Would like to have his  TAVR done  First    Pt would also  Like to have  His  Neck  also  Evaluated for his having  Neck pain  Pt  Also  Has  PVD issues  And  Needs to have them addressed by vascular  Pt   Will be seeing his cardiologist and I have spoke with him about  Addressing his  Valve and  Heart issues  Before his  Neck problems  Pt  May benefit from pain management until his neck pain issues  Can be repaired with surgery? Pt also has urinary incontinence and he is  Not able to  Use a condom cath for it  Does  Not  Stay on and he  Leaks  Pt  Will need to  Use  protectvie undergarments        Review of Systems   Constitutional: Negative for activity change, appetite change, chills, fatigue, fever and unexpected weight change  HENT: Negative for congestion, ear pain, hearing loss, mouth sores, postnasal drip, sinus pressure, sinus pain, sneezing and sore throat  Respiratory: Negative for apnea, cough, shortness of breath and wheezing  Cardiovascular: Negative for chest pain, palpitations and leg swelling  Gastrointestinal: Negative for abdominal pain, constipation, diarrhea, nausea and vomiting  Endocrine: Negative for cold intolerance and heat intolerance  Genitourinary: Negative for dysuria, frequency and hematuria  Musculoskeletal: Positive for neck pain  Negative for arthralgias, back pain, gait problem and joint swelling  Skin: Negative for rash  Neurological: Negative for dizziness, weakness and numbness  Hematological: Does not bruise/bleed easily  Psychiatric/Behavioral: Negative for agitation, behavioral problems, confusion, hallucinations and sleep disturbance  The patient is not nervous/anxious  Objective:     Physical Exam  Vitals signs and nursing note reviewed  Constitutional:       Appearance: He is well-developed  HENT:      Head: Normocephalic and atraumatic  Nose: Nose normal    Eyes:      General: No scleral icterus  Conjunctiva/sclera: Conjunctivae normal       Pupils: Pupils are equal, round, and reactive to light  Neck:      Musculoskeletal: Normal range of motion and neck supple  Thyroid: No thyromegaly  Cardiovascular:      Rate and Rhythm: Normal rate and regular rhythm  Pulses: no weak pulses          Dorsalis pedis pulses are 2+ on the right side and 2+ on the left side  Posterior tibial pulses are 2+ on the right side and 2+ on the left side  Heart sounds: Murmur present  Pulmonary:      Effort: Pulmonary effort is normal  No respiratory distress  Breath sounds: Normal breath sounds  No wheezing  Abdominal:      General: Bowel sounds are normal       Palpations: Abdomen is soft  Tenderness:  There is no abdominal tenderness  There is no guarding or rebound  Musculoskeletal: Normal range of motion  Feet:      Right foot:      Skin integrity: Warmth and dry skin present  No ulcer, skin breakdown, erythema or callus  Left foot:      Skin integrity: Warmth and dry skin present  No skin breakdown or erythema  Skin:     General: Skin is warm and dry  Findings: No rash  Neurological:      Mental Status: He is alert and oriented to person, place, and time  Psychiatric:         Behavior: Behavior normal        Patient's shoes and socks removed  Right Foot/Ankle   Right Foot Inspection  Skin Exam: skin normal, skin intact, dry skin and warmth no callus, no erythema, no maceration, no abnormal color, no pre-ulcer, no ulcer and no callus                          Toe Exam: ROM and strength within normal limitsno tenderness  Sensory   Vibration: intact  Proprioception: intact   Monofilament testing: intact  Vascular  Capillary refills: < 3 seconds  The right DP pulse is 2+  The right PT pulse is 2+  Left Foot/Ankle  Left Foot Inspection  Skin Exam: skin intact, dry skin and warmthno erythema, no maceration and normal color                         Toe Exam: no swelling and no tenderness                   Sensory   Vibration: intact  Proprioception: intact  Monofilament: intact  Vascular  Capillary refills: < 3 seconds  The left DP pulse is 2+  The left PT pulse is 2+  Assign Risk Category:  No deformity present; Loss of protective sensation;  No weak pulses       Risk: 1

## 2020-09-15 ENCOUNTER — LAB (OUTPATIENT)
Dept: LAB | Facility: MEDICAL CENTER | Age: 75
End: 2020-09-15
Payer: MEDICARE

## 2020-09-15 DIAGNOSIS — F17.219 CIGARETTE NICOTINE DEPENDENCE WITH NICOTINE-INDUCED DISORDER: ICD-10-CM

## 2020-09-15 DIAGNOSIS — E11.51 DM (DIABETES MELLITUS) WITH PERIPHERAL VASCULAR COMPLICATION (HCC): Chronic | ICD-10-CM

## 2020-09-15 DIAGNOSIS — I65.23 ASYMPTOMATIC BILATERAL CAROTID ARTERY STENOSIS: Chronic | ICD-10-CM

## 2020-09-15 DIAGNOSIS — Z95.1 S/P CABG (CORONARY ARTERY BYPASS GRAFT): Chronic | ICD-10-CM

## 2020-09-15 DIAGNOSIS — I35.0 NONRHEUMATIC AORTIC VALVE STENOSIS: ICD-10-CM

## 2020-09-15 DIAGNOSIS — I70.45: ICD-10-CM

## 2020-09-15 DIAGNOSIS — I10 ESSENTIAL HYPERTENSION: Chronic | ICD-10-CM

## 2020-09-15 LAB
ALBUMIN SERPL BCP-MCNC: 3.9 G/DL (ref 3.5–5)
ALP SERPL-CCNC: 49 U/L (ref 46–116)
ALT SERPL W P-5'-P-CCNC: 15 U/L (ref 12–78)
ANION GAP SERPL CALCULATED.3IONS-SCNC: 6 MMOL/L (ref 4–13)
AST SERPL W P-5'-P-CCNC: 10 U/L (ref 5–45)
BACTERIA UR QL AUTO: ABNORMAL /HPF
BASOPHILS # BLD AUTO: 0.04 THOUSANDS/ΜL (ref 0–0.1)
BASOPHILS NFR BLD AUTO: 1 % (ref 0–1)
BILIRUB SERPL-MCNC: 0.56 MG/DL (ref 0.2–1)
BILIRUB UR QL STRIP: ABNORMAL
BUN SERPL-MCNC: 21 MG/DL (ref 5–25)
CALCIUM SERPL-MCNC: 9.1 MG/DL (ref 8.3–10.1)
CHLORIDE SERPL-SCNC: 106 MMOL/L (ref 100–108)
CHOLEST SERPL-MCNC: 92 MG/DL (ref 50–200)
CLARITY UR: CLEAR
CO2 SERPL-SCNC: 27 MMOL/L (ref 21–32)
COLOR UR: ABNORMAL
CREAT SERPL-MCNC: 1.67 MG/DL (ref 0.6–1.3)
EOSINOPHIL # BLD AUTO: 0.36 THOUSAND/ΜL (ref 0–0.61)
EOSINOPHIL NFR BLD AUTO: 6 % (ref 0–6)
ERYTHROCYTE [DISTWIDTH] IN BLOOD BY AUTOMATED COUNT: 13.3 % (ref 11.6–15.1)
EST. AVERAGE GLUCOSE BLD GHB EST-MCNC: 137 MG/DL
GFR SERPL CREATININE-BSD FRML MDRD: 39 ML/MIN/1.73SQ M
GLUCOSE P FAST SERPL-MCNC: 116 MG/DL (ref 65–99)
GLUCOSE UR STRIP-MCNC: NEGATIVE MG/DL
HBA1C MFR BLD: 6.4 %
HCT VFR BLD AUTO: 36.5 % (ref 36.5–49.3)
HDLC SERPL-MCNC: 39 MG/DL
HGB BLD-MCNC: 12 G/DL (ref 12–17)
HGB UR QL STRIP.AUTO: NEGATIVE
HYALINE CASTS #/AREA URNS LPF: ABNORMAL /LPF
IMM GRANULOCYTES # BLD AUTO: 0.02 THOUSAND/UL (ref 0–0.2)
IMM GRANULOCYTES NFR BLD AUTO: 0 % (ref 0–2)
KETONES UR STRIP-MCNC: ABNORMAL MG/DL
LDLC SERPL CALC-MCNC: 36 MG/DL (ref 0–100)
LEUKOCYTE ESTERASE UR QL STRIP: ABNORMAL
LYMPHOCYTES # BLD AUTO: 1.52 THOUSANDS/ΜL (ref 0.6–4.47)
LYMPHOCYTES NFR BLD AUTO: 25 % (ref 14–44)
MAGNESIUM SERPL-MCNC: 1.7 MG/DL (ref 1.6–2.6)
MCH RBC QN AUTO: 30.8 PG (ref 26.8–34.3)
MCHC RBC AUTO-ENTMCNC: 32.9 G/DL (ref 31.4–37.4)
MCV RBC AUTO: 94 FL (ref 82–98)
MONOCYTES # BLD AUTO: 0.58 THOUSAND/ΜL (ref 0.17–1.22)
MONOCYTES NFR BLD AUTO: 10 % (ref 4–12)
NEUTROPHILS # BLD AUTO: 3.54 THOUSANDS/ΜL (ref 1.85–7.62)
NEUTS SEG NFR BLD AUTO: 58 % (ref 43–75)
NITRITE UR QL STRIP: NEGATIVE
NON-SQ EPI CELLS URNS QL MICRO: ABNORMAL /HPF
NRBC BLD AUTO-RTO: 0 /100 WBCS
PH UR STRIP.AUTO: 6 [PH]
PLATELET # BLD AUTO: 219 THOUSANDS/UL (ref 149–390)
PMV BLD AUTO: 10.6 FL (ref 8.9–12.7)
POTASSIUM SERPL-SCNC: 4.2 MMOL/L (ref 3.5–5.3)
PROT SERPL-MCNC: 6.9 G/DL (ref 6.4–8.2)
PROT UR STRIP-MCNC: ABNORMAL MG/DL
RBC # BLD AUTO: 3.9 MILLION/UL (ref 3.88–5.62)
RBC #/AREA URNS AUTO: ABNORMAL /HPF
SODIUM SERPL-SCNC: 139 MMOL/L (ref 136–145)
SP GR UR STRIP.AUTO: 1.02 (ref 1–1.03)
T4 FREE SERPL-MCNC: 1.21 NG/DL (ref 0.76–1.46)
TRIGL SERPL-MCNC: 85 MG/DL
TSH SERPL DL<=0.05 MIU/L-ACNC: 1.7 UIU/ML (ref 0.36–3.74)
URATE SERPL-MCNC: 8.2 MG/DL (ref 4.2–8)
UROBILINOGEN UR QL STRIP.AUTO: 1 E.U./DL
WBC # BLD AUTO: 6.06 THOUSAND/UL (ref 4.31–10.16)
WBC #/AREA URNS AUTO: ABNORMAL /HPF

## 2020-09-15 PROCEDURE — 84439 ASSAY OF FREE THYROXINE: CPT

## 2020-09-15 PROCEDURE — 83036 HEMOGLOBIN GLYCOSYLATED A1C: CPT

## 2020-09-15 PROCEDURE — 80061 LIPID PANEL: CPT

## 2020-09-15 PROCEDURE — 85025 COMPLETE CBC W/AUTO DIFF WBC: CPT

## 2020-09-15 PROCEDURE — 80053 COMPREHEN METABOLIC PANEL: CPT

## 2020-09-15 PROCEDURE — 83735 ASSAY OF MAGNESIUM: CPT

## 2020-09-15 PROCEDURE — 84443 ASSAY THYROID STIM HORMONE: CPT

## 2020-09-15 PROCEDURE — 84550 ASSAY OF BLOOD/URIC ACID: CPT

## 2020-09-15 PROCEDURE — 36415 COLL VENOUS BLD VENIPUNCTURE: CPT

## 2020-09-15 PROCEDURE — 81001 URINALYSIS AUTO W/SCOPE: CPT | Performed by: FAMILY MEDICINE

## 2020-09-25 DIAGNOSIS — E78.5 HYPERLIPIDEMIA, UNSPECIFIED HYPERLIPIDEMIA TYPE: ICD-10-CM

## 2020-09-25 RX ORDER — ATORVASTATIN CALCIUM 80 MG/1
TABLET, FILM COATED ORAL
Qty: 90 TABLET | Refills: 1 | Status: SHIPPED | OUTPATIENT
Start: 2020-09-25 | End: 2021-04-01

## 2020-10-08 ENCOUNTER — OFFICE VISIT (OUTPATIENT)
Dept: CARDIOLOGY CLINIC | Facility: MEDICAL CENTER | Age: 75
End: 2020-10-08
Payer: MEDICARE

## 2020-10-08 VITALS
SYSTOLIC BLOOD PRESSURE: 146 MMHG | HEIGHT: 70 IN | HEART RATE: 77 BPM | OXYGEN SATURATION: 97 % | BODY MASS INDEX: 26.27 KG/M2 | WEIGHT: 183.5 LBS | DIASTOLIC BLOOD PRESSURE: 56 MMHG

## 2020-10-08 DIAGNOSIS — I35.0 NONRHEUMATIC AORTIC VALVE STENOSIS: ICD-10-CM

## 2020-10-08 DIAGNOSIS — I10 ESSENTIAL HYPERTENSION: Chronic | ICD-10-CM

## 2020-10-08 DIAGNOSIS — Z95.1 S/P CABG (CORONARY ARTERY BYPASS GRAFT): Chronic | ICD-10-CM

## 2020-10-08 DIAGNOSIS — R00.1 SINUS BRADYCARDIA: ICD-10-CM

## 2020-10-08 DIAGNOSIS — I25.10 MULTIPLE VESSEL CORONARY ARTERY DISEASE: ICD-10-CM

## 2020-10-08 DIAGNOSIS — I77.9 AORTO-ILIAC DISEASE (HCC): Primary | Chronic | ICD-10-CM

## 2020-10-08 DIAGNOSIS — E78.5 DYSLIPIDEMIA: Chronic | ICD-10-CM

## 2020-10-08 PROCEDURE — 99214 OFFICE O/P EST MOD 30 MIN: CPT | Performed by: INTERNAL MEDICINE

## 2020-12-09 ENCOUNTER — OFFICE VISIT (OUTPATIENT)
Dept: FAMILY MEDICINE CLINIC | Facility: CLINIC | Age: 75
End: 2020-12-09
Payer: MEDICARE

## 2020-12-09 VITALS
HEIGHT: 70 IN | SYSTOLIC BLOOD PRESSURE: 148 MMHG | BODY MASS INDEX: 25.48 KG/M2 | HEART RATE: 69 BPM | RESPIRATION RATE: 16 BRPM | WEIGHT: 178 LBS | TEMPERATURE: 98.2 F | DIASTOLIC BLOOD PRESSURE: 64 MMHG | OXYGEN SATURATION: 98 %

## 2020-12-09 DIAGNOSIS — I10 ESSENTIAL HYPERTENSION: Primary | ICD-10-CM

## 2020-12-09 DIAGNOSIS — I35.0 NONRHEUMATIC AORTIC VALVE STENOSIS: ICD-10-CM

## 2020-12-09 DIAGNOSIS — I70.1 RENAL ARTERY STENOSIS, NATIVE, BILATERAL (HCC): ICD-10-CM

## 2020-12-09 DIAGNOSIS — Z00.00 MEDICARE ANNUAL WELLNESS VISIT, SUBSEQUENT: ICD-10-CM

## 2020-12-09 DIAGNOSIS — E78.5 DYSLIPIDEMIA: ICD-10-CM

## 2020-12-09 DIAGNOSIS — E11.51 DM (DIABETES MELLITUS) WITH PERIPHERAL VASCULAR COMPLICATION (HCC): ICD-10-CM

## 2020-12-09 DIAGNOSIS — F17.219 CIGARETTE NICOTINE DEPENDENCE WITH NICOTINE-INDUCED DISORDER: ICD-10-CM

## 2020-12-09 PROCEDURE — G0439 PPPS, SUBSEQ VISIT: HCPCS | Performed by: NURSE PRACTITIONER

## 2020-12-09 RX ORDER — A/SINGAPORE/GP1908/2015 IVR-180 (AN A/MICHIGAN/45/2015 (H1N1)PDM09-LIKE VIRUS, A/HONG KONG/4801/2014, NYMC X-263B (H3N2) (AN A/HONG KONG/4801/2014-LIKE VIRUS), AND B/BRISBANE/60/2008, WILD TYPE (A B/BRISBANE/60/2008-LIKE VIRUS) 15; 15; 15 UG/.5ML; UG/.5ML; UG/.5ML
INJECTION, SUSPENSION INTRAMUSCULAR
COMMUNITY
Start: 2020-10-06 | End: 2022-04-01 | Stop reason: CLARIF

## 2021-01-01 DIAGNOSIS — I10 ESSENTIAL HYPERTENSION: ICD-10-CM

## 2021-01-01 DIAGNOSIS — E11.9 TYPE 2 DIABETES MELLITUS WITHOUT COMPLICATION, WITH LONG-TERM CURRENT USE OF INSULIN (HCC): ICD-10-CM

## 2021-01-01 DIAGNOSIS — Z79.4 TYPE 2 DIABETES MELLITUS WITHOUT COMPLICATION, WITH LONG-TERM CURRENT USE OF INSULIN (HCC): ICD-10-CM

## 2021-01-01 RX ORDER — AMLODIPINE BESYLATE 10 MG/1
TABLET ORAL
Qty: 90 TABLET | Refills: 3 | Status: SHIPPED | OUTPATIENT
Start: 2021-01-01 | End: 2021-12-13

## 2021-01-01 RX ORDER — BENAZEPRIL HYDROCHLORIDE 40 MG/1
TABLET, FILM COATED ORAL
Qty: 90 TABLET | Refills: 3 | Status: SHIPPED | OUTPATIENT
Start: 2021-01-01 | End: 2021-12-13

## 2021-01-05 ENCOUNTER — HOSPITAL ENCOUNTER (OUTPATIENT)
Dept: RADIOLOGY | Facility: MEDICAL CENTER | Age: 76
Discharge: HOME | End: 2021-01-05
Payer: MEDICARE

## 2021-01-05 DIAGNOSIS — I65.23 CAROTID STENOSIS, BILATERAL: ICD-10-CM

## 2021-01-05 DIAGNOSIS — I70.213 ATHEROSCLEROSIS OF NATIVE ARTERIES OF EXTREMITIES WITH INTERMITTENT CLAUDICATION, BILATERAL LEGS (HCC): ICD-10-CM

## 2021-01-05 PROCEDURE — 93880 EXTRACRANIAL BILAT STUDY: CPT

## 2021-01-05 PROCEDURE — 93925 LOWER EXTREMITY STUDY: CPT

## 2021-01-05 PROCEDURE — 93925 LOWER EXTREMITY STUDY: CPT | Performed by: SURGERY

## 2021-01-05 PROCEDURE — 93922 UPR/L XTREMITY ART 2 LEVELS: CPT | Performed by: SURGERY

## 2021-01-05 PROCEDURE — 93923 UPR/LXTR ART STDY 3+ LVLS: CPT

## 2021-01-05 PROCEDURE — 93880 EXTRACRANIAL BILAT STUDY: CPT | Performed by: SURGERY

## 2021-03-03 ENCOUNTER — IMMUNIZATIONS (OUTPATIENT)
Dept: FAMILY MEDICINE CLINIC | Facility: HOSPITAL | Age: 76
End: 2021-03-03

## 2021-03-03 DIAGNOSIS — Z23 ENCOUNTER FOR IMMUNIZATION: Primary | ICD-10-CM

## 2021-03-03 PROCEDURE — 91300 SARS-COV-2 / COVID-19 MRNA VACCINE (PFIZER-BIONTECH) 30 MCG: CPT

## 2021-03-03 PROCEDURE — 0001A SARS-COV-2 / COVID-19 MRNA VACCINE (PFIZER-BIONTECH) 30 MCG: CPT

## 2021-03-15 ENCOUNTER — OFFICE VISIT (OUTPATIENT)
Dept: CARDIOLOGY CLINIC | Facility: MEDICAL CENTER | Age: 76
End: 2021-03-15
Payer: MEDICARE

## 2021-03-15 VITALS
WEIGHT: 181.9 LBS | HEIGHT: 70 IN | TEMPERATURE: 97 F | SYSTOLIC BLOOD PRESSURE: 150 MMHG | BODY MASS INDEX: 26.04 KG/M2 | OXYGEN SATURATION: 97 % | DIASTOLIC BLOOD PRESSURE: 58 MMHG | HEART RATE: 65 BPM

## 2021-03-15 DIAGNOSIS — I35.0 NONRHEUMATIC AORTIC VALVE STENOSIS: ICD-10-CM

## 2021-03-15 DIAGNOSIS — I10 ESSENTIAL HYPERTENSION: Chronic | ICD-10-CM

## 2021-03-15 DIAGNOSIS — I77.9 AORTO-ILIAC DISEASE (HCC): Primary | Chronic | ICD-10-CM

## 2021-03-15 DIAGNOSIS — I73.9 PAD (PERIPHERAL ARTERY DISEASE) (HCC): ICD-10-CM

## 2021-03-15 DIAGNOSIS — Z95.1 S/P CABG (CORONARY ARTERY BYPASS GRAFT): Chronic | ICD-10-CM

## 2021-03-15 DIAGNOSIS — I25.10 MULTIPLE VESSEL CORONARY ARTERY DISEASE: ICD-10-CM

## 2021-03-15 DIAGNOSIS — I70.213 ATHEROSCLEROSIS OF NATIVE ARTERIES OF EXTREMITIES WITH INTERMITTENT CLAUDICATION, BILATERAL LEGS (HCC): ICD-10-CM

## 2021-03-15 DIAGNOSIS — E78.5 DYSLIPIDEMIA: Chronic | ICD-10-CM

## 2021-03-15 DIAGNOSIS — R00.1 SINUS BRADYCARDIA: ICD-10-CM

## 2021-03-15 PROCEDURE — 99214 OFFICE O/P EST MOD 30 MIN: CPT | Performed by: INTERNAL MEDICINE

## 2021-03-15 NOTE — PROGRESS NOTES
Cardiology   Blair Solano  66 y o  male MRN: [id number]        Impression:  1  Coronary artery disease s/p CABG - doing well  2  Hypertension - borderline control  On Hydralazine  Not optimal treatment regimen, but was getting headaches with metoprolol  3  Severe bilateral carotid disease - followed by vascular surgery  4  Dyslipidemia - doing well  5  Peripheral arterial disease - s/p LLE revascularization  Doing well  6  Aortic stenosis - severe as of echo 5/20   7  Bradycardia - improved off b-blockers          Recommendations:  1  Continue current medications  2  Follow up with Vascular Surgery  3  Follow up with CT surgery for TAVR  4  Echo to evaluate progression of aortic stenosis  5  Follow up in 4 months  HPI: Blair Solano  is a 66y o  year old male with coronary artery disease s/p CABG and LLE revascularization, severe aortic stenosis,  who returns for follow up  No chest pain, shortness of breath, or palpitations  No lightheaded/dizziness  Recent echo 5/20 demonstrated normal LV systolic function with severe aortic stenosis  Holter monitor demonstrated occasional bradycardia with maximum of 2 7 second pauses  Patient thinking about getting TAVR  Review of Systems   Constitutional: Negative  HENT: Negative  Eyes: Negative  Respiratory: Negative for chest tightness and shortness of breath  Cardiovascular: Negative for chest pain, palpitations and leg swelling  Gastrointestinal: Negative  Endocrine: Negative  Genitourinary: Negative  Musculoskeletal: Negative  Skin: Negative  Allergic/Immunologic: Negative  Neurological: Negative  Hematological: Negative  Psychiatric/Behavioral: Negative  All other systems reviewed and are negative          Past Medical History:   Diagnosis Date    Arteriosclerosis [facility] arteries [facility] extremities [facility] [address] [facility]     CAD (coronary artery disease)     [facility]     Dyslipidemia     Endocrine pancreas disorder     GERD (gastroesophageal reflux disease)     Hyperlipidemia     Hypertension     Kidney stones     PAD (peripheral artery disease) (HCC)      Past Surgical History:   Procedure Laterality Date    APPENDECTOMY      COLECTOMY      COLONOSCOPY  2013    CORONARY ARTERY BYPASS GRAFT  2013    X 2    FEMORAL ARTERY - POPLITEAL ARTERY BYPASS GRAFT      HEMORRHOID SURGERY      IR AORTAGRAM WITH RUN-OFF  11/19/2018    [place] SLCTV CATHJ 3RD+ ORD SLCTV ABDL PEL/LXTR [facility] Left 8/12/2016    Procedure: LEFT FEMORAL ARTERIOGRAM; BALLOON ANGIOPLASTY; SFA  AND FEMORAL AT VEIN GRAFT;  Surgeon: Bud Tom MD;  [facility];  Service: Vascular    TONSILLECTOMY AND ADENOIDECTOMY       Social History     Substance and Sexual Activity   Alcohol Use Yes    Frequency: Monthly or less    Drinks per session: 1 or 2    Binge frequency: Never    Comment: occasional     Social History     Substance and Sexual Activity   Drug Use No     Social History     Tobacco Use   Smoking Status Current Every Day Smoker    Packs/day: 1 00    Years: 50 00    Pack years: 50 00    Types: Cigarettes   Smokeless Tobacco Never Used     Family History   Problem Relation Age of Onset    Heart attack Father     Other Sister         bypass and vlave replacement    Stroke Paternal Uncle     Arrhythmia Neg Hx     Asthma Neg Hx     Clotting disorder Neg Hx     Fainting Neg Hx     Anuerysm Neg Hx     Hypertension Neg Hx         unsure     Hyperlipidemia Neg Hx     Heart failure Neg Hx        Allergies:  No Known Allergies    Medications:     Current Outpatient Medications:     amLODIPine (NORVASC) 10 mg tablet, TAKE 1 TABLET DAILY, Disp: 90 tablet, Rfl: 3    AMYLASE-LIPASE-PROTEASE PO, Take by mouth , Disp: , Rfl:     aspirin (ECOTRIN LOW STRENGTH) 81 mg EC tablet, Take 81 mg by mouth daily, Disp: , Rfl:     atorvastatin (LIPITOR) 80 mg tablet, TAKE 1 TABLET DAILY, Disp: 90 tablet, Rfl: 1    benazepril (LOTENSIN) 40 MG tablet, TAKE 1 TABLET DAILY, Disp: 90 tablet, Rfl: 3    clopidogrel (PLAVIX) 75 mg tablet, Take 1 tablet (75 mg total) by mouth daily, Disp: 90 tablet, Rfl: 3    Cyanocobalamin (VITAMIN B12 PO), Take by mouth once a week , Disp: , Rfl:     hydrALAZINE (APRESOLINE) 50 mg tablet, Take 1 tablet (50 mg total) by mouth 2 (two) times a day, Disp: 180 tablet, Rfl: 3    Magnesium Oxide (MAG-200 PO), Take by mouth once a week , Disp: , Rfl:     metFORMIN (GLUCOPHAGE) 500 mg tablet, TAKE 2 TABLETS TWICE A DAY, Disp: 360 tablet, Rfl: 1    Multiple Vitamin (MULTIVITAMIN) tablet, Take 1 tablet by mouth daily  , Disp: , Rfl:     pantoprazole (PROTONIX) 40 mg tablet, 40 mg daily , Disp: , Rfl:     Fluad Quadrivalent 0 5 ML PRSY, PHARMACY ADMINISTERED, Disp: , Rfl:       Wt Readings from Last 3 Encounters:   03/15/21 82 5 kg (181 lb 14 4 oz)   02/23/21 80 7 kg (178 lb)   12/09/20 80 7 kg (178 lb)     Temp Readings from Last 3 Encounters:   03/15/21 (!) 97 °F (36 1 °C) (Tympanic)   12/09/20 98 2 °F (36 8 °C) (Temporal)   09/09/20 97 6 °F (36 4 °C)     BP Readings from Last 3 Encounters:   03/15/21 150/58   12/09/20 148/64   10/08/20 146/56     Pulse Readings from Last 3 Encounters:   03/15/21 65   12/09/20 69   10/08/20 77         Physical Exam  HENT:      Head: Atraumatic  Mouth/Throat:      Mouth: Mucous membranes are moist    Eyes:      Extraocular Movements: Extraocular movements intact  Neck:      Musculoskeletal: Normal range of motion  Cardiovascular:      Rate and Rhythm: Normal rate and regular rhythm  Heart sounds: Murmur present  Systolic murmur present with a grade of 3/6  Pulmonary:      Effort: Pulmonary effort is normal       Breath sounds: Normal breath sounds  Abdominal:      General: Abdomen is flat  Musculoskeletal: Normal range of motion  Skin:     General: Skin is warm  Neurological:      General: No focal deficit present        Mental Status: He is alert and oriented to person, place, and time  Psychiatric:         Mood and Affect: Mood normal          Behavior: Behavior normal            Laboratory Studies:  CMP:  Lab Results   Component Value Date     2015    K 4 2 09/15/2020     09/15/2020    CO2 27 09/15/2020    ANIONGAP 7 2015    BUN 21 09/15/2020    CREATININE 1 67 (H) 09/15/2020    GLUCOSE 125 2015    AST 10 09/15/2020    ALT 15 09/15/2020    EGFR 39 09/15/2020       Lipid Profile:   No results found for: CHOL  Lab Results   Component Value Date    HDL 39 (L) 09/15/2020     Lab Results   Component Value Date    LDLCALC 36 09/15/2020     Lab Results   Component Value Date    TRIG 85 09/15/2020       Cardiac testing:     Results for orders placed during the hospital encounter of 20   Echo complete with contrast if indicated    Narrative [address] [facility]  [phone]    Transthoracic Echocardiogram  2D, M-mode, Doppler, and Color Doppler    Study date:  28-May-2020    Patient: Glynn Houston  MR number: [de-identified]  Account number: [de-identified]  : 1945  Age: 66 years  Gender: Male  Status: Outpatient  Location: [place] [address]   Height: 70 in  Weight: 186 6 lb  BP: 134/ 62 mmHg    Indications: Aortic Stenosis    Diagnoses: I35 0 - Nonrheumatic aortic (valve) stenosis    Sonographer:  Parviz Murray RDCS  Primary Physician:  Suman Cannon MD  Referring Physician:  Noelle Mcdowell MD  Group:  [facility]  Interpreting Physician:  Dao Rae MD    SUMMARY    LEFT VENTRICLE:  Systolic function was normal by visual assessment  Ejection fraction was estimated to be 60 %  There were no regional wall motion abnormalities  Wall thickness was mildly to moderately increased    Features were consistent with a pseudonormal left ventricular filling pattern, with concomitant abnormal relaxation and increased filling pressure (grade 2 diastolic dysfunction)  LEFT ATRIUM:  The atrium was mildly dilated  RIGHT ATRIUM:  The atrium was mildly dilated  MITRAL VALVE:  There was mild annular calcification  There was mild regurgitation  AORTIC VALVE:  The valve was trileaflet  Leaflets exhibited normal thickness, marked calcification, and markedly reduced cuspal separation  Transaortic velocity was increased due to valvular stenosis  There was severe stenosis  There was mild regurgitation  TRICUSPID VALVE:  There was mild regurgitation  PULMONIC VALVE:  There was mild regurgitation  IVC, HEPATIC VEINS:  The inferior vena cava was dilated  HISTORY: PRIOR HISTORY: CAD, DM, GERD, Dyslipidemia, Hypertension, CABG    PROCEDURE: The study was performed in the [address]  This was a routine study  The transthoracic approach was used  The study included complete 2D imaging, M-mode, complete spectral Doppler, and color Doppler  The heart rate was  52 bpm, at the start of the study  Images were obtained from the parasternal, apical, subcostal, and suprasternal notch acoustic windows  Image quality was adequate  LEFT VENTRICLE: Size was normal  Systolic function was normal by visual assessment  Ejection fraction was estimated to be 60 %  There were no regional wall motion abnormalities  Wall thickness was mildly to moderately increased  DOPPLER:  The ratio of early ventricular filling to atrial contraction velocities was within the normal range  Features were consistent with a pseudonormal left ventricular filling pattern, with concomitant abnormal relaxation and increased filling  pressure (grade 2 diastolic dysfunction)  RIGHT VENTRICLE: The size was normal  Systolic function was normal  DOPPLER: Systolic pressure was not estimated  LEFT ATRIUM: The atrium was mildly dilated  RIGHT ATRIUM: The atrium was mildly dilated  MITRAL VALVE: There was mild annular calcification   Valve structure was normal  There was mild calcification  There was normal leaflet separation  DOPPLER: The transmitral velocity was within the normal range  There was no evidence for  stenosis  There was mild regurgitation  AORTIC VALVE: The valve was trileaflet  Leaflets exhibited normal thickness, marked calcification, and markedly reduced cuspal separation  DOPPLER: Transaortic velocity was increased due to valvular stenosis  There was severe stenosis  There was mild regurgitation  TRICUSPID VALVE: The valve structure was normal  There was normal leaflet separation  DOPPLER: The transtricuspid velocity was within the normal range  There was no evidence for stenosis  There was mild regurgitation  PULMONIC VALVE: Leaflets exhibited normal thickness, no calcification, and normal cuspal separation  DOPPLER: The transpulmonic velocity was within the normal range  There was mild regurgitation  PERICARDIUM: There was no pericardial effusion  AORTA: The root exhibited normal size  SYSTEMIC VEINS: IVC: The inferior vena cava was dilated  SYSTEM MEASUREMENT TABLES    2D  %FS: 32 27 %  AV Diam: 2 57 cm  EDV(Teich): 111 7 ml  EF(Teich): 60 35 %  ESV(Teich): 44 29 ml  IVSd: 1 12 cm  LA Area: 22 27 cm2  LA Diam: 3 84 cm  LVEDV MOD A4C: 163 61 ml  LVEF MOD A4C: 56 44 %  LVESV MOD A4C: 71 27 ml  LVIDd: 4 88 cm  LVIDs: 3 3 cm  LVLd A4C: 10 3 cm  LVLs A4C: 9 45 cm  LVOT Diam: 2 03 cm  LVPWd: 1 13 cm  RA Area: 19 57 cm2  RV Diam : 3 82 cm  SI(Teich): 33 21 ml/m2  SV MOD A4C: 92 34 ml  SV(Cube): 80 07 ml  SV(Teich): 67 42 ml    CW  AV Env  Ti: 428 84 ms  AV MaxP 8 mmHg  AV VTI: 115 68 cm  AV Vmax: 3 77 m/s  AV Vmean: 2 69 m/s  AV meanP 22 mmHg  TR MaxP 39 mmHg  TR Vmax: 1 76 m/s    MM  TAPSE: 2 22 cm    PW  LUIS (VTI): 0 79 cm2  LUIS Vmax: 0 72 cm2  E': 0 04 m/s  E/E': 24 18  LVOT Env  Ti: 471 35 ms  LVOT VTI: 28 36 cm  LVOT Vmax: 0 84 m/s  LVOT Vmean: 0 6 m/s  LVOT maxP 79 mmHg  LVOT meanP 69 mmHg  MV A Pop: 0 83 m/s  MV Dec [place]: 3 48 m/s2  MV DecT: 303 91 ms  MV E Pop: 1 06 m/s  MV E/A Ratio: 1 28    [employer] Accredited Echocardiography [facility]    Prepared and electronically signed by    Kathy Raza MD  Signed [id number] 15:15:22       No results found for this or any previous visit  No results found for this or any previous visit  No results found for this or any previous visit

## 2021-03-15 NOTE — PATIENT INSTRUCTIONS
Recommendations:  1  Continue current medications  2  Follow up with Vascular Surgery  3  Follow up with CT surgery for TAVR  4  Echo to evaluate progression of aortic stenosis  5  Follow up in 4 months

## 2021-03-24 ENCOUNTER — IMMUNIZATIONS (OUTPATIENT)
Dept: FAMILY MEDICINE CLINIC | Facility: HOSPITAL | Age: 76
End: 2021-03-24

## 2021-03-24 DIAGNOSIS — Z23 ENCOUNTER FOR IMMUNIZATION: Primary | ICD-10-CM

## 2021-03-24 PROCEDURE — 0002A SARS-COV-2 / COVID-19 MRNA VACCINE (PFIZER-BIONTECH) 30 MCG: CPT

## 2021-03-24 PROCEDURE — 91300 SARS-COV-2 / COVID-19 MRNA VACCINE (PFIZER-BIONTECH) 30 MCG: CPT

## 2021-03-26 ENCOUNTER — TELEPHONE (OUTPATIENT)
Dept: FAMILY MEDICINE CLINIC | Facility: CLINIC | Age: 76
End: 2021-03-26

## 2021-03-26 NOTE — TELEPHONE ENCOUNTER
Patient called to let you know you will be getting an order from [facility] for Urinary incontinence supplies and he did request them  So if you could please sign and send back for him

## 2021-04-01 ENCOUNTER — TELEPHONE (OUTPATIENT)
Dept: GASTROENTEROLOGY | Facility: CLINIC | Age: 76
End: 2021-04-01

## 2021-04-01 DIAGNOSIS — E78.5 HYPERLIPIDEMIA, UNSPECIFIED HYPERLIPIDEMIA TYPE: ICD-10-CM

## 2021-04-01 DIAGNOSIS — K21.9 GASTROESOPHAGEAL REFLUX DISEASE, UNSPECIFIED WHETHER ESOPHAGITIS PRESENT: Primary | ICD-10-CM

## 2021-04-01 RX ORDER — ATORVASTATIN CALCIUM 80 MG/1
TABLET, FILM COATED ORAL
Qty: 90 TABLET | Refills: 3 | Status: SHIPPED | OUTPATIENT
Start: 2021-04-01 | End: 2022-02-23

## 2021-04-01 RX ORDER — PANTOPRAZOLE SODIUM 40 MG/1
40 TABLET, DELAYED RELEASE ORAL DAILY
Qty: 30 TABLET | Refills: 1 | Status: SHIPPED | OUTPATIENT
Start: 2021-04-01 | End: 2021-05-14 | Stop reason: SDUPTHER

## 2021-04-01 NOTE — TELEPHONE ENCOUNTER
Patient had left message asking for a call to schedule an appt and needing a refill of his medication  Patient is due for EGD 6/5/21 for villalba's and colon recall due 7/6/21 for hx of polyps with Dr Jes Kenny  I returned his call and had to leave message for him to call back

## 2021-04-01 NOTE — TELEPHONE ENCOUNTER
Patient left message asking for a refill of his pantoprazole and to make an appt  I returned his call however received his voicemail to call back to schedule  Can you please send his refill in  Thank you!

## 2021-04-02 ENCOUNTER — TELEPHONE (OUTPATIENT)
Dept: FAMILY MEDICINE CLINIC | Facility: CLINIC | Age: 76
End: 2021-04-02

## 2021-04-02 NOTE — TELEPHONE ENCOUNTER
Received a call from [facility] in reference to his incontinence supplies  She said that everything was good but missing chart notes explaining why he can not use a condom catheter  What she needs now is a letter from you stating why he can not use a condom catheter and I can fax back to them

## 2021-04-07 ENCOUNTER — TELEPHONE (OUTPATIENT)
Dept: GASTROENTEROLOGY | Facility: CLINIC | Age: 76
End: 2021-04-07

## 2021-04-07 DIAGNOSIS — K21.9 GASTROESOPHAGEAL REFLUX DISEASE, UNSPECIFIED WHETHER ESOPHAGITIS PRESENT: Primary | ICD-10-CM

## 2021-04-07 RX ORDER — PANTOPRAZOLE SODIUM 40 MG/1
40 TABLET, DELAYED RELEASE ORAL DAILY
Qty: 90 TABLET | Refills: 3 | OUTPATIENT
Start: 2021-04-07

## 2021-04-07 NOTE — TELEPHONE ENCOUNTER
LMOM for patient to call and schedule a FU for medication renewal   If I don't hear from him I will call in 1 week

## 2021-04-07 NOTE — TELEPHONE ENCOUNTER
Called pt and LMOM for him to call and schedule appt  For FU and med renewal  Will call him in 1 week if I don't hear from him

## 2021-04-07 NOTE — TELEPHONE ENCOUNTER
Patient had renew of medication sent to [facility] 6 days ago for 30 days  He needs to make follow up before any further medication renewal  Patient not seen since 2019  Thank you

## 2021-04-07 NOTE — TELEPHONE ENCOUNTER
Patient had renew of medication by Narciso Veloz 6 days ago for 30 days sent to local [facility]  He needs to make follow up before any further medication renewal, not seen since 2019   Thank you

## 2021-04-08 NOTE — TELEPHONE ENCOUNTER
Patient received your message and left message this morning stating he already has an appointment on 5/14  Thanks!

## 2021-05-06 ENCOUNTER — HOSPITAL ENCOUNTER (OUTPATIENT)
Dept: NON INVASIVE DIAGNOSTICS | Facility: MEDICAL CENTER | Age: 76
Discharge: HOME | End: 2021-05-06
Payer: MEDICARE

## 2021-05-06 DIAGNOSIS — I35.0 NONRHEUMATIC AORTIC VALVE STENOSIS: ICD-10-CM

## 2021-05-06 PROCEDURE — 93306 TTE W/DOPPLER COMPLETE: CPT

## 2021-05-06 PROCEDURE — 93306 TTE W/DOPPLER COMPLETE: CPT | Performed by: INTERNAL MEDICINE

## 2021-05-14 ENCOUNTER — OFFICE VISIT (OUTPATIENT)
Dept: GASTROENTEROLOGY | Facility: CLINIC | Age: 76
End: 2021-05-14
Payer: MEDICARE

## 2021-05-14 VITALS
HEART RATE: 69 BPM | DIASTOLIC BLOOD PRESSURE: 64 MMHG | WEIGHT: 180 LBS | BODY MASS INDEX: 25.77 KG/M2 | SYSTOLIC BLOOD PRESSURE: 195 MMHG | HEIGHT: 70 IN

## 2021-05-14 DIAGNOSIS — I10 UNCONTROLLED HYPERTENSION: ICD-10-CM

## 2021-05-14 DIAGNOSIS — K21.9 GASTROESOPHAGEAL REFLUX DISEASE, UNSPECIFIED WHETHER ESOPHAGITIS PRESENT: ICD-10-CM

## 2021-05-14 DIAGNOSIS — K86.81 EXOCRINE PANCREATIC INSUFFICIENCY: Primary | ICD-10-CM

## 2021-05-14 DIAGNOSIS — I35.0 NONRHEUMATIC AORTIC VALVE STENOSIS: ICD-10-CM

## 2021-05-14 PROCEDURE — 99214 OFFICE O/P EST MOD 30 MIN: CPT | Performed by: INTERNAL MEDICINE

## 2021-05-14 RX ORDER — PANTOPRAZOLE SODIUM 40 MG/1
40 TABLET, DELAYED RELEASE ORAL DAILY
Qty: 90 TABLET | Refills: 1 | Status: SHIPPED | OUTPATIENT
Start: 2021-05-14 | End: 2021-12-13

## 2021-05-14 NOTE — PROGRESS NOTES
[facility] - Outpatient Follow-up Note  Basil Mancera  64 y o  male MRN: [id number]  Encounter: [id number]          ASSESSMENT AND PLAN:      1  Gastroesophageal reflux disease, unspecified whether esophagitis present   patient is taking pantoprazole every day, he is asking refill her medication, was on omeprazole and then it was switched to pantoprazole, pantoprazole work better for, he currently denies any dysphagia, no odynophagia, no abdominal pain, no or reflux symptoms  He is due for EGD which will hold it until he go for TAVR  For aortic stenosis  - pantoprazole (PROTONIX) 40 mg tablet; Take 1 tablet (40 mg total) by mouth daily  Dispense: 90 tablet; Refill: 1    2  Exocrine pancreatic insufficiency   continue with Creon [address]25 International Units 1 tablet before each meal   Patient is getting pancreatic enzyme from [address] because it is cheaper for him  Will do stool lose for elastase level in 1 year    3  Uncontrolled hypertension   advised to see cardiologist, he is already on 3 antihypertensive medication and still today's blood pressure is 192 /64    4  Nonrheumatic aortic valve stenosis   patient was seen by cardiologist and recommended CT surgery consultation for TAVR    ______________________________________________________________________    SUBJECTIVE:   Patient seen and examined  He currently denying on heartburn, no reflux symptoms, no abdominal pain, no diarrhea, his stool is solid, he has a history of exocrine pancreatic insufficiency, his stool for elastase level was very low and also blood test also shows low amylase and lipase  Pancreatic enzyme was started initially [facility] and now he is on Creon, he is getting medication from [address] because it is cheaper   He   Is due for EGD and colonoscopy which we will hold it until TAVR is  done    REVIEW OF SYSTEMS IS OTHERWISE NEGATIVE        Historical Information   Past Medical History: Diagnosis Date    Arteriosclerosis of arteries of extremities ([facility] [address] [facility]     CAD (coronary artery disease)     Diabetes ([facility] [address] [facility]     Dyslipidemia     Endocrine pancreas disorder     GERD (gastroesophageal reflux disease)     Hyperlipidemia     Hypertension     Kidney stones     [facility]      Past Surgical History:   Procedure Laterality Date    APPENDECTOMY      COLECTOMY      COLONOSCOPY  2013    CORONARY ARTERY BYPASS GRAFT  2013    X 2    FEMORAL ARTERY - POPLITEAL ARTERY BYPASS GRAFT      HEMORRHOID SURGERY      IR AORTAGRAM WITH RUN-OFF  11/19/2018    [place] SLCTV CATHJ 3RD+ ORD SLCTV ABDL PEL/LXTR [facility] Left 8/12/2016    Procedure: LEFT FEMORAL ARTERIOGRAM; BALLOON ANGIOPLASTY; SFA  AND FEMORAL AT VEIN GRAFT;  Surgeon: Gavin Miller MD;  [facility];  Service: Vascular    TONSILLECTOMY AND ADENOIDECTOMY       Social History   Social History     Substance and Sexual Activity   Alcohol Use Yes    Frequency: Monthly or less    Drinks per session: 1 or 2    Binge frequency: Never    Comment: occasional     Social History     Substance and Sexual Activity   Drug Use No     Social History     Tobacco Use   Smoking Status Current Every Day Smoker    Packs/day: 1 00    Years: 50 00    Pack years: 50 00    Types: Cigarettes   Smokeless Tobacco Never Used     Family History   Problem Relation Age of Onset    Heart attack Father     Other Sister         bypass and vlave replacement    Stroke Paternal Uncle     Arrhythmia Neg Hx     Asthma Neg Hx     Clotting disorder Neg Hx     Fainting Neg Hx     Anuerysm Neg Hx     Hypertension Neg Hx         unsure     Hyperlipidemia Neg Hx     Heart failure Neg Hx        Meds/Allergies       Current Outpatient Medications:     amLODIPine (NORVASC) 10 mg tablet    AMYLASE-LIPASE-PROTEASE PO    aspirin (ECOTRIN LOW STRENGTH) 81 mg EC tablet    atorvastatin (LIPITOR) 80 mg tablet    benazepril (LOTENSIN) 40 MG tablet    clopidogrel (PLAVIX) 75 mg tablet    Cyanocobalamin (VITAMIN B12 PO)    Fluad Quadrivalent 0 5 ML PRSY    hydrALAZINE (APRESOLINE) 50 mg tablet    Magnesium Oxide (MAG-200 PO)    metFORMIN (GLUCOPHAGE) 500 mg tablet    Multiple Vitamin (MULTIVITAMIN) tablet    pantoprazole (PROTONIX) 40 mg tablet    No Known Allergies        Objective     Blood pressure (!) 195/64, pulse 69, height 5' 10" (1 778 m), weight 81 6 kg (180 lb)  Body mass index is 25 83 kg/m²  PHYSICAL EXAM:      General Appearance:   Alert, cooperative, no distress   HEENT:   Normocephalic, atraumatic, anicteric      Neck:  Supple, symmetrical, trachea midline   Lungs:   Clear to auscultation bilaterally; no rales, rhonchi or wheezing; respirations unlabored    Heart[de-identified]   Regular rate and rhythm; no murmur, rub, or gallop  Abdomen:   Soft, non-tender, non-distended; normal bowel sounds; no masses, no organomegaly    Genitalia:   Deferred    Rectal:   Deferred    Extremities:  No cyanosis, clubbing or edema    Pulses:  2+ and symmetric    Skin:  No jaundice, rashes, or lesions    Lymph nodes:  No palpable cervical lymphadenopathy        Lab Results:   No visits with results within 1 Day(s) from this visit     Latest known visit with results is:   Lab on 09/15/2020   Component Date Value    Sodium 09/15/2020 139     Potassium 09/15/2020 4 2     Chloride 09/15/2020 106     CO2 09/15/2020 27     ANION GAP 09/15/2020 6     BUN 09/15/2020 21     Creatinine 09/15/2020 1 67*    Glucose, Fasting 09/15/2020 116*    Calcium 09/15/2020 9 1     AST 09/15/2020 10     ALT 09/15/2020 15     Alkaline Phosphatase 09/15/2020 49     Total Protein 09/15/2020 6 9     Albumin 09/15/2020 3 9     Total Bilirubin 09/15/2020 0 56     eGFR 09/15/2020 39     WBC 09/15/2020 6 06     RBC 09/15/2020 3 90     Hemoglobin 09/15/2020 12 0     Hematocrit 09/15/2020 36 5     MCV 09/15/2020 94     MCH 09/15/2020 30 8     MCHC 09/15/2020 32 9     RDW 09/15/2020 13 3     MPV 09/15/2020 10 6     Platelets 82/65/4289 219     nRBC 09/15/2020 0     Neutrophils Relative 09/15/2020 58     Immat GRANS % 09/15/2020 0     Lymphocytes Relative 09/15/2020 25     Monocytes Relative 09/15/2020 10     Eosinophils Relative 09/15/2020 6     Basophils Relative 09/15/2020 1     Neutrophils Absolute 09/15/2020 3 54     Immature Grans Absolute 09/15/2020 0 02     Lymphocytes Absolute 09/15/2020 1 52     Monocytes Absolute 09/15/2020 0 58     Eosinophils Absolute 09/15/2020 0 36     Basophils Absolute 09/15/2020 0 04     Magnesium 09/15/2020 1 7     Uric Acid 09/15/2020 8 2*    Cholesterol 09/15/2020 92     Triglycerides 09/15/2020 85     HDL, Direct 09/15/2020 39*    LDL Calculated 09/15/2020 36     TSH 3RD GENERATON 09/15/2020 1 700     Free T4 09/15/2020 1 21     Hemoglobin A1C 09/15/2020 6 4*    EAG 09/15/2020 137          Radiology Results:   No results found

## 2021-05-19 ENCOUNTER — OFFICE VISIT (OUTPATIENT)
Dept: FAMILY MEDICINE CLINIC | Facility: CLINIC | Age: 76
End: 2021-05-19
Payer: MEDICARE

## 2021-05-19 VITALS
WEIGHT: 180 LBS | SYSTOLIC BLOOD PRESSURE: 140 MMHG | TEMPERATURE: 98.3 F | HEIGHT: 70 IN | HEART RATE: 56 BPM | RESPIRATION RATE: 18 BRPM | DIASTOLIC BLOOD PRESSURE: 62 MMHG | OXYGEN SATURATION: 98 % | BODY MASS INDEX: 25.77 KG/M2

## 2021-05-19 DIAGNOSIS — I10 ESSENTIAL HYPERTENSION: Primary | Chronic | ICD-10-CM

## 2021-05-19 DIAGNOSIS — Z12.2 ENCOUNTER FOR SCREENING FOR LUNG CANCER: ICD-10-CM

## 2021-05-19 DIAGNOSIS — Z87.891 PERSONAL HISTORY OF NICOTINE DEPENDENCE: ICD-10-CM

## 2021-05-19 PROCEDURE — 99213 OFFICE O/P EST LOW 20 MIN: CPT | Performed by: FAMILY MEDICINE

## 2021-05-19 NOTE — ASSESSMENT & PLAN NOTE
Patient here for checkup on his blood pressure at this point we will hold off and recheck his blood pressure in 2 weeks he will trying get blood pressures taking care of lice home with his own machine and will take it to his specialist tomorrow to calibrate

## 2021-05-19 NOTE — PROGRESS NOTES
BMI Counseling: Body mass index is 25 83 kg/m²  The BMI is above normal  Nutrition recommendations include decreasing portion sizes, encouraging healthy choices of fruits and vegetables, decreasing fast food intake, consuming healthier snacks, limiting drinks that contain sugar, moderation in carbohydrate intake, increasing intake of lean protein, reducing intake of saturated and trans fat and reducing intake of cholesterol  Exercise recommendations include exercising 3-5 times per week  No pharmacotherapy was ordered  Patient referred to PCP due to patient being overweight  Falls Plan of Care: balance, strength, and gait training instructions were provided  Home safety education provided  Assessment/Plan:      Diagnoses and all orders for this visit:    Encounter for screening for lung cancer  -     CT lung screening program; Future    Personal history of nicotine dependence  -     CT lung screening program; Future    Other orders  -     Hemoglobin A1C ([facility]); Future          Subjective:     Patient ID: Una Watson  is a 64 y o  male  This 58-year-old white male here today for a checkup on his blood pressure and coronary artery disease  Patient had some recent blood pressures which were very high at his consult is office and today he is better but he still on the high side with a systolic at 3:56 a m  Kana De La Vega Discussed with him about is giving it some more time recheck him in 2 weeks or to make a change right now to something like Diovan HCT in place of his benazepril  Patient decided to wait with his current regimen and will recheck him back in 2 weeks  Review of Systems   Constitutional: Negative for activity change, appetite change, chills, fatigue, fever and unexpected weight change  HENT: Negative for congestion, ear pain, hearing loss, mouth sores, postnasal drip, sinus pressure, sinus pain, sneezing and sore throat      Respiratory: Negative for apnea, cough, shortness of breath and wheezing  Cardiovascular: Negative for chest pain, palpitations and leg swelling  Gastrointestinal: Negative for abdominal pain, constipation, diarrhea, nausea and vomiting  Endocrine: Negative for cold intolerance and heat intolerance  Genitourinary: Negative for dysuria, frequency and hematuria  Musculoskeletal: Negative for arthralgias, back pain, gait problem, joint swelling and neck pain  Skin: Negative for rash  Neurological: Negative for dizziness, weakness and numbness  Hematological: Does not bruise/bleed easily  Psychiatric/Behavioral: Negative for agitation, behavioral problems, confusion, hallucinations and sleep disturbance  The patient is not nervous/anxious  Objective:     Physical Exam  Vitals signs and nursing note reviewed  Constitutional:       Appearance: He is well-developed  HENT:      Head: Normocephalic and atraumatic  Nose: Nose normal       Mouth/Throat:      Mouth: Mucous membranes are moist    Eyes:      General: No scleral icterus  Conjunctiva/sclera: Conjunctivae normal       Pupils: Pupils are equal, round, and reactive to light  Neck:      Musculoskeletal: Normal range of motion and neck supple  Thyroid: No thyromegaly  Cardiovascular:      Rate and Rhythm: Normal rate and regular rhythm  Heart sounds: Normal heart sounds  Pulmonary:      Effort: Pulmonary effort is normal  No respiratory distress  Breath sounds: Normal breath sounds  No wheezing  Abdominal:      General: Bowel sounds are normal       Palpations: Abdomen is soft  Tenderness: There is no abdominal tenderness  There is no guarding or rebound  Musculoskeletal: Normal range of motion  Skin:     General: Skin is warm and dry  Findings: No rash  Neurological:      Mental Status: He is alert and oriented to person, place, and time     Psychiatric:         Mood and Affect: Mood normal          Behavior: Behavior normal          Thought Content:  Thought content normal          Judgment: Judgment normal

## 2021-05-20 ENCOUNTER — OFFICE VISIT (OUTPATIENT)
Dept: VASCULAR SURGERY | Facility: CLINIC | Age: 76
End: 2021-05-20
Payer: MEDICARE

## 2021-05-20 VITALS
TEMPERATURE: 96.7 F | WEIGHT: 180 LBS | DIASTOLIC BLOOD PRESSURE: 64 MMHG | SYSTOLIC BLOOD PRESSURE: 160 MMHG | HEART RATE: 62 BPM | BODY MASS INDEX: 25.77 KG/M2 | HEIGHT: 70 IN

## 2021-05-20 DIAGNOSIS — I73.9 PAD (PERIPHERAL ARTERY DISEASE) (HCC): ICD-10-CM

## 2021-05-20 DIAGNOSIS — T82.858D STENOSIS OF NONCORONARY BYPASS GRAFT, SUBSEQUENT ENCOUNTER: ICD-10-CM

## 2021-05-20 DIAGNOSIS — I65.23 ASYMPTOMATIC BILATERAL CAROTID ARTERY STENOSIS: Primary | Chronic | ICD-10-CM

## 2021-05-20 PROCEDURE — 99213 OFFICE O/P EST LOW 20 MIN: CPT | Performed by: NURSE PRACTITIONER

## 2021-05-20 NOTE — PATIENT INSTRUCTIONS
Will evaluate your carotid and lower extremities with duplex in July and see you in the office in 1 year  Remain active  Continue ASA/Plavix and statin therapy  Follow up with your PCP for BP check  If you have worsening leg symptoms notify the office

## 2021-05-20 NOTE — PROGRESS NOTES
Assessment/Plan:    Stenosis of noncoronary bypass graft [facility])   68-year-old male with HTN, HLD, DM, CAD, CABG, bilateral asymptomatic carotid stenosis, PAD status post left fem to anterior tibial bypass by Dr Tere Munoz '15, s/p  Angioplasty to bypass graft stenoses '16, recurrent bypass graft stenosis who returns to the office for yearly visit to review LEAD/CV duplex 1/5/21  -LEAD showed moderate to severe diffuse disease, R prox PFA/mid SFA/prox popliteal 50-75% stenosis, >75% distal SFA stenosis vs near occlusion, PT occluded, R SAMEER 0 44/84/52 (prior 0 74) and   Left femoral ectasia 1 8 cm, <50% L CFA , L prox PFA  >75% stenosis,  SFA to 80 bypass graft is patent with >75% stenosis distal portion of the graft and 50-75% stenosis at the distal anastomosis, R SAMEER  0 71/166/78   -No change in lower extremity symptoms  Intermittent claudication  No ischemic rest pain, no ischemic tissue loss   -Palpable left lower extremity bypass pulse   -Continue aspirin/Plavix and Lipitor  -Repeat arterial duplex in July  -Follow up in the office in 1 year    Asymptomatic bilateral carotid artery stenosis  -Bilateral carotid stenosis   -CV duplex 1/5/21 showed  Bilateral ICA greater than 70% stenosis  Right ICA velocities 246/48, ratio 3 09 and left /62, ratio 2 3  -asymptomatic from a carotid standpoint   -Repeat CV duplex in July   -continue antiplatelet and statin therapy  -Reviewed signs symptoms of TIA / CVA and that he call 911 immediately should experience these symptoms       Diagnoses and all orders for this visit:    Asymptomatic bilateral carotid artery stenosis  -     VAS carotid complete study; Future    PAD (peripheral artery disease) ([facility])  -     VAS lower limb arterial duplex, complete bilateral; Future    Stenosis of noncoronary bypass graft, subsequent encounter  -     VAS lower limb arterial duplex, complete bilateral; Future          Subjective:      Patient ID: Nena Bradford  is a 64 y o  male     Patient is here to rev CV and FREDO done on 1/05/21  Patient c/o L foot swells and is worse at night  Patient reports cramping in the L ankle when he is walking  Patient reports he has been having headaches recently but denies TIA/ CVA symptoms  Patient is taking atorvastatin, Plavix, and ASA 81mg  HPI  66-year-old male with HTN, HLD, DM, CAD, CABG, bilateral asymptomatic carotid stenosis, PAD status post left fem to anterior tibial bypass by Dr Tere Munoz '15, s/p  Angioplasty to bypass graft stenoses '16, recurrent bypass graft stenosis who returns to the office for yearly visit to review LEAD/CV duplex 1/5/21   lower extremity arterial duplex showed decreased right SAMEER to 0 4  He is complaining of left leg swelling and intermittent claudication symptoms  No change in symptoms  No ischemic rest pain or ischemic tissue loss  No focal neurological events consistent with TIA / CVA  The following portions of the patient's history were reviewed and updated as appropriate: allergies, current medications, past family history, past medical history, past social history, past surgical history and problem list   ROS reviewed     Review of Systems   Constitutional: Negative  HENT: Negative  Eyes: Negative  Respiratory: Negative  Cardiovascular: Positive for leg swelling (L foot)  Gastrointestinal: Negative  Endocrine: Negative  Genitourinary: Negative  Musculoskeletal: Negative  Skin: Negative  Allergic/Immunologic: Negative  Neurological: Negative  Hematological: Negative  Psychiatric/Behavioral: Negative  Objective:    I have reviewed and made appropriate changes to the review of systems input by the medical assistant      Vitals:    05/20/21 0830   BP: 160/64   BP Location: Left arm   Patient Position: Sitting   Cuff Size: Standard   Pulse: 62   Temp: (!) 96 7 °F (35 9 °C)   TempSrc: Tympanic   Weight: 81 6 kg (180 lb)   Height: 5' 10" (1 778 m)       Patient Active Problem List   Diagnosis    Atherosclerosis of native arteries of extremities with intermittent claudication, bilateral legs (HCC)    [facility]) [facility])    Stenosis of noncoronary bypass graft ([facility] [address] )    DM (diabetes mellitus) with peripheral vascular complication ([facility])    Asymptomatic bilateral carotid artery stenosis    S/P CABG (coronary artery bypass graft)    Essential hypertension    Aorto-iliac disease ([facility] [address] )    Renal artery stenosis, native, bilateral ([facility] [address] )    Dyslipidemia    CAD (coronary artery disease)    Progressive angina ([facility])    Tension headache    Cigarette nicotine dependence with nicotine-induced disorder    Calcific tendinitis of right shoulder region    Right shoulder pain    Nonrheumatic aortic valve stenosis    Sinus bradycardia       Past Surgical History:   Procedure Laterality Date    APPENDECTOMY      COLECTOMY      COLONOSCOPY  2013    CORONARY ARTERY BYPASS GRAFT  2013    X 2    FEMORAL ARTERY - POPLITEAL ARTERY BYPASS GRAFT      HEMORRHOID SURGERY      IR AORTAGRAM WITH RUN-OFF  11/19/2018    RI SLCTV CATHJ 3RD+ ORD SLCTV ABDL PEL/LXTR [facility] Left 8/12/2016    Procedure: LEFT FEMORAL ARTERIOGRAM; BALLOON ANGIOPLASTY; SFA  AND FEMORAL AT VEIN GRAFT;  Surgeon: Richa Paredes MD;  [facility]: [facility];  Service: Vascular    TONSILLECTOMY AND ADENOIDECTOMY         Family History   Problem Relation Age of Onset    Heart attack Father     Other Sister         bypass and vlave replacement    Stroke Paternal Uncle     Arrhythmia Neg Hx     Asthma Neg Hx     Clotting disorder Neg Hx     Fainting Neg Hx     Anuerysm Neg Hx     Hypertension Neg Hx         unsure     Hyperlipidemia Neg Hx     Heart failure Neg Hx        Social History     Socioeconomic History    Marital status:      Spouse name: Not on file    Number of children: Not on file    Years of education: Not on file    Highest education level: Not on file Occupational History    Not on file   Social Needs    Financial resource strain: Not on file    Food insecurity     Worry: Not on file     Inability: Not on file    Transportation needs     Medical: Not on file     Non-medical: Not on file   Tobacco Use    Smoking status: Current Every Day Smoker     Packs/day: 1 00     Years: 50 00     Pack years: 50 00     Types: Cigarettes    Smokeless tobacco: Never Used   Substance and Sexual Activity    Alcohol use: Yes     Frequency: Monthly or less     Drinks per session: 1 or 2     Binge frequency: Never     Comment: occasional    Drug use: No    Sexual activity: Not on file   Lifestyle    Physical activity     Days per week: Not on file     Minutes per session: Not on file    Stress: Not on file   Relationships    Social connections     Talks on phone: Not on file     Gets together: Not on file     Attends Judaism service: Not on file     Active member of club or organization: Not on file     Attends meetings of clubs or organizations: Not on file     Relationship status: Not on file    Intimate partner violence     Fear of current or ex partner: Not on file     Emotionally abused: Not on file     Physically abused: Not on file     Forced sexual activity: Not on file   Other Topics Concern    Not on file   Social History Narrative    · Most recent tobacco use screenin2018      · Do you currently or have you served in [employer]: Yes      · If Yes, What branch of service:   [employer]      · Occupation:   Dentists      · Exercise level:   Occasional        · Caffeine intake:   Heavy      · Marital status:         · Diet:   Regular  low fat     · Seat belts used routinely:   Yes      · Smoke alarm in home: Yes      · Advance directive:    Yes      · General stress level:   Low        No Known Allergies      Current Outpatient Medications:     amLODIPine (NORVASC) 10 mg tablet, TAKE 1 TABLET DAILY, Disp: 90 tablet, Rfl: 3   AMYLASE-LIPASE-PROTEASE PO, Take by mouth , Disp: , Rfl:     aspirin (ECOTRIN LOW STRENGTH) 81 mg EC tablet, Take 81 mg by mouth daily, Disp: , Rfl:     atorvastatin (LIPITOR) 80 mg tablet, TAKE 1 TABLET DAILY AT     BEDTIME, Disp: 90 tablet, Rfl: 3    benazepril (LOTENSIN) 40 MG tablet, TAKE 1 TABLET DAILY, Disp: 90 tablet, Rfl: 3    clopidogrel (PLAVIX) 75 mg tablet, Take 1 tablet (75 mg total) by mouth daily, Disp: 90 tablet, Rfl: 3    Cyanocobalamin (VITAMIN B12 PO), Take by mouth once a week , Disp: , Rfl:     Fluad Quadrivalent 0 5 ML PRSY, PHARMACY ADMINISTERED, Disp: , Rfl:     hydrALAZINE (APRESOLINE) 50 mg tablet, Take 1 tablet (50 mg total) by mouth 2 (two) times a day, Disp: 180 tablet, Rfl: 3    Magnesium Oxide (MAG-200 PO), Take by mouth once a week , Disp: , Rfl:     metFORMIN (GLUCOPHAGE) 500 mg tablet, TAKE 2 TABLETS TWICE A DAY, Disp: 360 tablet, Rfl: 1    Multiple Vitamin (MULTIVITAMIN) tablet, Take 1 tablet by mouth daily  , Disp: , Rfl:     pantoprazole (PROTONIX) 40 mg tablet, Take 1 tablet (40 mg total) by mouth daily, Disp: 90 tablet, Rfl: 1    /64 (BP Location: Left arm, Patient Position: Sitting, Cuff Size: Standard)   Pulse 62   Temp (!) 96 7 °F (35 9 °C) (Tympanic)   Ht 5' 10" (1 778 m)   Wt 81 6 kg (180 lb)   BMI 25 83 kg/m²          Physical Exam  Vitals signs and nursing note reviewed  Constitutional:       Appearance: He is well-developed  HENT:      Head: Normocephalic and atraumatic  Eyes:      Extraocular Movements: Extraocular movements intact  Neck:      Musculoskeletal: Neck supple  Vascular: Carotid bruit present  Cardiovascular:      Pulses:           Femoral pulses are 2+ on the right side and 2+ on the left side  Dorsalis pedis pulses are 0 on the right side and 0 on the left side  Posterior tibial pulses are 0 on the right side and 0 on the left side  Heart sounds: Normal heart sounds        Comments:  left lateral knee/calf palpable bypass pulse  Pulmonary:      Effort: Pulmonary effort is normal    Abdominal:      Palpations: Abdomen is soft  Musculoskeletal: Normal range of motion  General: Swelling present  Skin:     General: Skin is warm  Neurological:      Mental Status: He is alert and oriented to person, place, and time     Psychiatric:         Behavior: Behavior normal

## 2021-05-27 NOTE — ASSESSMENT & PLAN NOTE
-Bilateral carotid stenosis   -CV duplex 1/5/21 showed  Bilateral ICA greater than 70% stenosis    Right ICA velocities 246/48, ratio 3 09 and left /62, ratio 2 3  -asymptomatic from a carotid standpoint   -Repeat CV duplex in July   -continue antiplatelet and statin therapy  -Reviewed signs symptoms of TIA / CVA and that he call 911 immediately should experience these symptoms

## 2021-05-27 NOTE — ASSESSMENT & PLAN NOTE
66-year-old male with HTN, HLD, DM, CAD, CABG, bilateral asymptomatic carotid stenosis, PAD status post left fem to anterior tibial bypass by Dr Radha Clemons '15, s/p  Angioplasty to bypass graft stenoses '16, recurrent bypass graft stenosis who returns to the office for yearly visit to review LEAD/CV duplex 1/5/21  -LEAD showed moderate to severe diffuse disease, R prox PFA/mid SFA/prox popliteal 50-75% stenosis, >75% distal SFA stenosis vs near occlusion, PT occluded, R SAMEER 0 44/84/52 (prior 0 74) and   Left femoral ectasia 1 8 cm, <50% L CFA , L prox PFA  >75% stenosis,  SFA to 80 bypass graft is patent with >75% stenosis distal portion of the graft and 50-75% stenosis at the distal anastomosis, R SAMEER  0 71/166/78   -No change in lower extremity symptoms  Intermittent claudication   No ischemic rest pain, no ischemic tissue loss   -Palpable left lower extremity bypass pulse   -Continue aspirin/Plavix and Lipitor  -Repeat arterial duplex in July  -Follow up in the office in 1 year

## 2021-06-01 PROBLEM — E11.22 TYPE 2 DIABETES MELLITUS WITH STAGE 3 CHRONIC KIDNEY DISEASE (HCC): Status: ACTIVE | Noted: 2021-06-01

## 2021-06-01 PROBLEM — N18.30 TYPE 2 DIABETES MELLITUS WITH STAGE 3 CHRONIC KIDNEY DISEASE (HCC): Status: ACTIVE | Noted: 2021-06-01

## 2021-06-01 PROBLEM — N18.30 CKD (CHRONIC KIDNEY DISEASE), STAGE III (HCC): Status: ACTIVE | Noted: 2021-06-01

## 2021-06-03 NOTE — ASSESSMENT & PLAN NOTE
Needs A1C to assess diabetes control  Lab Results   Component Value Date    HGBA1C 6 4 (H) 09/15/2020

## 2021-06-03 NOTE — ASSESSMENT & PLAN NOTE
Patient  is stable with current medication and we discussed a low fat low cholesterol diet  Weight loss also discussed for this will help lower cholesterol also  Recheck lipids in 6 months   Needs Lipid panel

## 2021-06-03 NOTE — PROGRESS NOTES
Falls Plan of Care: balance, strength, and gait training instructions were provided  Home safety education provided  Assessment/Plan:         Problem List Items Addressed This Visit        Endocrine    Type 2 diabetes mellitus with stage 3 chronic kidney disease (HCC)     Needs A1C to assess diabetes control  Lab Results   Component Value Date    HGBA1C 6 4 (H) 09/15/2020               Cardiovascular and Mediastinum    Essential hypertension - Primary (Chronic)    CAD (coronary artery disease)     Patient is stable  and will continue present plan of care and reassess at next routine visit  All questions about this problem from patient were answered today  Nervous and Auditory    Cigarette nicotine dependence with nicotine-induced disorder     Patient encouraged to quit using tobacco for that increases their risk for COPD, lung cancer,stroke, oral cancer and heart disease  If patient does not want to quit they should let me know  when they are interested in quitting  There are numerous options to use to quit and we can discuss them  Other Visit Diagnoses     Encounter for screening for lung cancer        Personal history of nicotine dependence                Subjective:      Patient ID: Babs Nieves  is a 64 y o  male  This is a 66-year-old white male here for checkup on his hypertension diabetes hyperlipidemia and aortic stenosis  Patient is on his normal blood pressure medicine his blood pressure is better today  Patient is overdue for his lab work all order his labs for him for his next visit we also discussed about getting the screening CT scan for his chest due to his history of smoking as well as asbestos exposure        The following portions of the patient's history were reviewed and updated as appropriate:   Past Medical History:  He has a past medical history of Arteriosclerosis of arteries of extremities ([address] ), CAD (coronary artery disease), Diabetes ([facility] [address] ), Dyslipidemia, Endocrine pancreas disorder, GERD (gastroesophageal reflux disease), Hyperlipidemia, Hypertension, Kidney stones, and PAD (peripheral artery disease) ([address] )  ,  _______________________________________________________________________  Medical Problems:  does not have any pertinent problems on file ,  _______________________________________________________________________  Past Surgical History:   has a past surgical history that includes Coronary artery bypass graft (2013); Femoral artery - popliteal artery bypass graft; Appendectomy; Colectomy; Tonsillectomy and adenoidectomy; pr slctv cathj 3rd+ ord slctv abdl pel/lxtr brnch (Left, 8/12/2016); Colonoscopy (2013); Hemorrhoid surgery; and IR aortagram with run-off (11/19/2018)  ,  _______________________________________________________________________  Family History:  family history includes Heart attack in his father; Other in his sister; Stroke in his paternal uncle ,  _______________________________________________________________________  Social History:   reports that he has been smoking cigarettes  He has a 50 00 pack-year smoking history  He has never used smokeless tobacco  He reports current alcohol use  He reports that he does not use drugs  ,  _______________________________________________________________________  Allergies:  has No Known Allergies     _______________________________________________________________________  Current Outpatient Medications   Medication Sig Dispense Refill    amLODIPine (NORVASC) 10 mg tablet TAKE 1 TABLET DAILY 90 tablet 3    AMYLASE-LIPASE-PROTEASE PO Take by mouth       aspirin (ECOTRIN LOW STRENGTH) 81 mg EC tablet Take 81 mg by mouth daily      atorvastatin (LIPITOR) 80 mg tablet TAKE 1 TABLET DAILY AT     BEDTIME 90 tablet 3    benazepril (LOTENSIN) 40 MG tablet TAKE 1 TABLET DAILY 90 tablet 3    clopidogrel (PLAVIX) 75 mg tablet Take 1 tablet (75 mg total) by mouth daily 90 tablet 3    Cyanocobalamin (VITAMIN B12 PO) Take by mouth once a week       Fluad Quadrivalent 0 5 ML PRSY PHARMACY ADMINISTERED      hydrALAZINE (APRESOLINE) 50 mg tablet Take 1 tablet (50 mg total) by mouth 2 (two) times a day 180 tablet 3    Magnesium Oxide (MAG-200 PO) Take by mouth once a week       metFORMIN (GLUCOPHAGE) 500 mg tablet TAKE 2 TABLETS TWICE A  tablet 1    Multiple Vitamin (MULTIVITAMIN) tablet Take 1 tablet by mouth daily   pantoprazole (PROTONIX) 40 mg tablet Take 1 tablet (40 mg total) by mouth daily 90 tablet 1     No current facility-administered medications for this visit       _______________________________________________________________________  Review of Systems   Constitutional: Negative for activity change, appetite change, chills, fatigue, fever and unexpected weight change  HENT: Negative for congestion, ear pain, hearing loss, mouth sores, postnasal drip, sinus pressure, sinus pain, sneezing and sore throat  Respiratory: Negative for apnea, cough, shortness of breath and wheezing  Cardiovascular: Negative for chest pain, palpitations and leg swelling  Gastrointestinal: Negative for abdominal pain, constipation, diarrhea, nausea and vomiting  Endocrine: Negative for cold intolerance and heat intolerance  Genitourinary: Negative for dysuria, frequency and hematuria  Musculoskeletal: Negative for arthralgias, back pain, gait problem, joint swelling and neck pain  Skin: Negative for rash  Neurological: Negative for dizziness, weakness and numbness  Hematological: Does not bruise/bleed easily  Psychiatric/Behavioral: Negative for agitation, behavioral problems, confusion, hallucinations and sleep disturbance  The patient is not nervous/anxious  Objective: There were no vitals filed for this visit  There is no height or weight on file to calculate BMI  Physical Exam  Vitals signs and nursing note reviewed     Constitutional:       Appearance: He is well-developed  He is obese  HENT:      Head: Normocephalic and atraumatic  Nose: Nose normal       Mouth/Throat:      Mouth: Mucous membranes are moist    Eyes:      General: No scleral icterus  Conjunctiva/sclera: Conjunctivae normal       Pupils: Pupils are equal, round, and reactive to light  Neck:      Musculoskeletal: Normal range of motion and neck supple  Thyroid: No thyromegaly  Cardiovascular:      Rate and Rhythm: Normal rate and regular rhythm  Heart sounds: Normal heart sounds  Pulmonary:      Effort: Pulmonary effort is normal  No respiratory distress  Breath sounds: Normal breath sounds  No wheezing  Abdominal:      General: Bowel sounds are normal       Palpations: Abdomen is soft  Tenderness: There is no abdominal tenderness  There is no guarding or rebound  Musculoskeletal: Normal range of motion  Skin:     General: Skin is warm and dry  Findings: No rash  Neurological:      Mental Status: He is alert and oriented to person, place, and time     Psychiatric:         Behavior: Behavior normal

## 2021-06-04 ENCOUNTER — OFFICE VISIT (OUTPATIENT)
Dept: FAMILY MEDICINE CLINIC | Facility: CLINIC | Age: 76
End: 2021-06-04
Payer: MEDICARE

## 2021-06-04 VITALS
SYSTOLIC BLOOD PRESSURE: 140 MMHG | HEIGHT: 70 IN | BODY MASS INDEX: 25.34 KG/M2 | HEART RATE: 58 BPM | TEMPERATURE: 98.2 F | RESPIRATION RATE: 18 BRPM | DIASTOLIC BLOOD PRESSURE: 58 MMHG | OXYGEN SATURATION: 97 % | WEIGHT: 177 LBS

## 2021-06-04 DIAGNOSIS — E11.22 TYPE 2 DIABETES MELLITUS WITH STAGE 3B CHRONIC KIDNEY DISEASE, WITHOUT LONG-TERM CURRENT USE OF INSULIN (HCC): ICD-10-CM

## 2021-06-04 DIAGNOSIS — N18.32 TYPE 2 DIABETES MELLITUS WITH STAGE 3B CHRONIC KIDNEY DISEASE, WITHOUT LONG-TERM CURRENT USE OF INSULIN (HCC): ICD-10-CM

## 2021-06-04 DIAGNOSIS — I10 ESSENTIAL HYPERTENSION: Primary | Chronic | ICD-10-CM

## 2021-06-04 DIAGNOSIS — E78.5 DYSLIPIDEMIA: ICD-10-CM

## 2021-06-04 DIAGNOSIS — Z12.2 ENCOUNTER FOR SCREENING FOR LUNG CANCER: ICD-10-CM

## 2021-06-04 DIAGNOSIS — I25.10 CORONARY ARTERY DISEASE INVOLVING NATIVE HEART WITHOUT ANGINA PECTORIS, UNSPECIFIED VESSEL OR LESION TYPE: ICD-10-CM

## 2021-06-04 DIAGNOSIS — Z87.891 PERSONAL HISTORY OF NICOTINE DEPENDENCE: ICD-10-CM

## 2021-06-04 DIAGNOSIS — F17.219 CIGARETTE NICOTINE DEPENDENCE WITH NICOTINE-INDUCED DISORDER: ICD-10-CM

## 2021-06-04 PROCEDURE — 99214 OFFICE O/P EST MOD 30 MIN: CPT | Performed by: FAMILY MEDICINE

## 2021-06-11 ENCOUNTER — TELEPHONE (OUTPATIENT)
Dept: CARDIOLOGY CLINIC | Facility: CLINIC | Age: 76
End: 2021-06-11

## 2021-06-11 NOTE — TELEPHONE ENCOUNTER
Called patient and gave results  ----- Message from Navi Mckeon MD sent at 6/2/2021 10:25 AM EDT -----  Please call patient and let him know that his echo looked essentially unchanged from previously  Normal cardiac function with severe aortic stenosis  Thanks

## 2021-06-23 DIAGNOSIS — E11.9 TYPE 2 DIABETES MELLITUS WITHOUT COMPLICATION, WITH LONG-TERM CURRENT USE OF INSULIN (HCC): ICD-10-CM

## 2021-06-23 DIAGNOSIS — Z79.4 TYPE 2 DIABETES MELLITUS WITHOUT COMPLICATION, WITH LONG-TERM CURRENT USE OF INSULIN (HCC): ICD-10-CM

## 2021-06-30 ENCOUNTER — HOSPITAL ENCOUNTER (OUTPATIENT)
Dept: RADIOLOGY | Facility: MEDICAL CENTER | Age: 76
Discharge: HOME | End: 2021-06-30
Payer: MEDICARE

## 2021-06-30 ENCOUNTER — TELEPHONE (OUTPATIENT)
Dept: GASTROENTEROLOGY | Facility: CLINIC | Age: 76
End: 2021-06-30

## 2021-06-30 ENCOUNTER — APPOINTMENT (OUTPATIENT)
Dept: LAB | Facility: MEDICAL CENTER | Age: 76
End: 2021-06-30
Payer: MEDICARE

## 2021-06-30 DIAGNOSIS — Z12.2 ENCOUNTER FOR SCREENING FOR LUNG CANCER: ICD-10-CM

## 2021-06-30 DIAGNOSIS — E11.22 TYPE 2 DIABETES MELLITUS WITH STAGE 3B CHRONIC KIDNEY DISEASE, WITHOUT LONG-TERM CURRENT USE OF INSULIN (HCC): ICD-10-CM

## 2021-06-30 DIAGNOSIS — Z87.891 PERSONAL HISTORY OF NICOTINE DEPENDENCE: ICD-10-CM

## 2021-06-30 DIAGNOSIS — N18.32 TYPE 2 DIABETES MELLITUS WITH STAGE 3B CHRONIC KIDNEY DISEASE, WITHOUT LONG-TERM CURRENT USE OF INSULIN (HCC): ICD-10-CM

## 2021-06-30 DIAGNOSIS — E78.5 DYSLIPIDEMIA: ICD-10-CM

## 2021-06-30 LAB
ALBUMIN SERPL BCP-MCNC: 3.2 G/DL (ref 3.5–5)
ALP SERPL-CCNC: 112 U/L (ref 46–116)
ALT SERPL W P-5'-P-CCNC: 26 U/L (ref 12–78)
ANION GAP SERPL CALCULATED.3IONS-SCNC: 8 MMOL/L (ref 4–13)
AST SERPL W P-5'-P-CCNC: 16 U/L (ref 5–45)
BILIRUB SERPL-MCNC: 0.61 MG/DL (ref 0.2–1)
BUN SERPL-MCNC: 31 MG/DL (ref 5–25)
CALCIUM ALBUM COR SERPL-MCNC: 9.5 MG/DL (ref 8.3–10.1)
CALCIUM SERPL-MCNC: 8.9 MG/DL (ref 8.3–10.1)
CHLORIDE SERPL-SCNC: 107 MMOL/L (ref 100–108)
CHOLEST SERPL-MCNC: 85 MG/DL (ref 50–200)
CO2 SERPL-SCNC: 22 MMOL/L (ref 21–32)
CREAT SERPL-MCNC: 1.84 MG/DL (ref 0.6–1.3)
EST. AVERAGE GLUCOSE BLD GHB EST-MCNC: 123 MG/DL
GFR SERPL CREATININE-BSD FRML MDRD: 34 ML/MIN/1.73SQ M
GLUCOSE P FAST SERPL-MCNC: 116 MG/DL (ref 65–99)
HBA1C MFR BLD: 5.9 %
HDLC SERPL-MCNC: 31 MG/DL
LDLC SERPL CALC-MCNC: 36 MG/DL (ref 0–100)
POTASSIUM SERPL-SCNC: 4.6 MMOL/L (ref 3.5–5.3)
PROT SERPL-MCNC: 7.4 G/DL (ref 6.4–8.2)
SODIUM SERPL-SCNC: 137 MMOL/L (ref 136–145)
TRIGL SERPL-MCNC: 90 MG/DL

## 2021-06-30 PROCEDURE — 83036 HEMOGLOBIN GLYCOSYLATED A1C: CPT

## 2021-06-30 PROCEDURE — 71271 CT THORAX LUNG CANCER SCR C-: CPT

## 2021-06-30 PROCEDURE — 80053 COMPREHEN METABOLIC PANEL: CPT

## 2021-06-30 PROCEDURE — 36415 COLL VENOUS BLD VENIPUNCTURE: CPT

## 2021-06-30 PROCEDURE — 80061 LIPID PANEL: CPT

## 2021-06-30 NOTE — TELEPHONE ENCOUNTER
Patient had left message that he got a call to schedule his EGD and thinks he is due for colonoscopy as well  Although he is going to be having a procedure done, something with the aortic valve and doesn't know if Dr Bettye Sims would do it until that is fixed  I returned his call and had to leave message that he is due for both, EGD for hx of villalba's and colon for hx of polyps and tubular adenoma  I told him that it would be up to the cardiologist if he wants his EGD and colon done prior to having his aortic valve fixed  I told him to call back

## 2021-07-07 ENCOUNTER — HOSPITAL ENCOUNTER (OUTPATIENT)
Dept: RADIOLOGY | Facility: MEDICAL CENTER | Age: 76
Discharge: HOME | End: 2021-07-07
Payer: MEDICARE

## 2021-07-07 DIAGNOSIS — I65.23 ASYMPTOMATIC BILATERAL CAROTID ARTERY STENOSIS: Chronic | ICD-10-CM

## 2021-07-07 DIAGNOSIS — I73.9 PAD (PERIPHERAL ARTERY DISEASE) (HCC): ICD-10-CM

## 2021-07-07 DIAGNOSIS — T82.858D STENOSIS OF NONCORONARY BYPASS GRAFT, SUBSEQUENT ENCOUNTER: ICD-10-CM

## 2021-07-07 PROCEDURE — 93923 UPR/LXTR ART STDY 3+ LVLS: CPT

## 2021-07-07 PROCEDURE — 93880 EXTRACRANIAL BILAT STUDY: CPT

## 2021-07-07 PROCEDURE — 93925 LOWER EXTREMITY STUDY: CPT

## 2021-07-08 PROCEDURE — 93922 UPR/L XTREMITY ART 2 LEVELS: CPT | Performed by: SURGERY

## 2021-07-08 PROCEDURE — 93925 LOWER EXTREMITY STUDY: CPT | Performed by: SURGERY

## 2021-07-08 PROCEDURE — 93880 EXTRACRANIAL BILAT STUDY: CPT | Performed by: SURGERY

## 2021-07-27 ENCOUNTER — OFFICE VISIT (OUTPATIENT)
Dept: VASCULAR SURGERY | Facility: CLINIC | Age: 76
End: 2021-07-27
Payer: MEDICARE

## 2021-07-27 VITALS
BODY MASS INDEX: 25 KG/M2 | HEART RATE: 57 BPM | WEIGHT: 174.6 LBS | DIASTOLIC BLOOD PRESSURE: 52 MMHG | TEMPERATURE: 97.8 F | HEIGHT: 70 IN | SYSTOLIC BLOOD PRESSURE: 126 MMHG

## 2021-07-27 DIAGNOSIS — I65.23 ASYMPTOMATIC BILATERAL CAROTID ARTERY STENOSIS: Primary | ICD-10-CM

## 2021-07-27 DIAGNOSIS — T82.858D STENOSIS OF NONCORONARY BYPASS GRAFT, SUBSEQUENT ENCOUNTER: ICD-10-CM

## 2021-07-27 PROCEDURE — 99213 OFFICE O/P EST LOW 20 MIN: CPT | Performed by: NURSE PRACTITIONER

## 2021-07-27 NOTE — PROGRESS NOTES
Assessment/Plan:    Stenosis of noncoronary bypass graft [facility])   78-year-old male with HTN, HLD, DM, CAD, CABG, AS, bilateral asymptomatic carotid stenosis, PAD status post left fem to anterior tibial bypass by Dr Narayan Medina '15, s/p  Angioplasty to bypass graft stenoses '16, recurrent bypass graft stenosis who returns to the office to review LEAD and CV duplex 7/7/21   -LEAD showed severe diffuse RLE disease, R proximal PFA/mid SFA/popliteal 50-75% stenosis, distal SFA >75% stenosis, PT occluded,  R SAMEER 0 32/84/52 and L CFA 1 8cm, L CFA <50% stenosis, L PFA >75% stenosis, SL SFA to AT bypass with a >75% distal graft stenosis and 50-75% distal anastomosis stenosis, L SAMEER 0 64/166/78   -Short distance claudication symptoms  No ischemic rest pain or ischemic tissue loss   -Repeat duplex in 6 months   -continue ASA, Plavix and statin therapy   -Counseled on the importance of smoking cessation  He states it is too late to quit now  Needs additional counseling   -Counseled on the importance of proper foot care and avoiding injury to foot   -Follow up after duplex with Dr Danial Chavarria to review studies       Asymptomatic bilateral carotid artery stenosis  -CV duplex 7/7 showed bilateral ICA stenosis 70+% stenosis, R JESIKA velocities 318/74, ratio 4 14 and left ICA velocities 294/47, ratio 2 69   -Reviewed with Dr Kelly Ochoa with medical management  -Repeat duplex in 6 months  -Reviewed s/s of stroke/TIA and that he call 911 immediately should he experience symptoms        Diagnoses and all orders for this visit:    Asymptomatic bilateral carotid artery stenosis  -     VAS carotid complete study; Future    Stenosis of noncoronary bypass graft, subsequent encounter  -     VAS lower limb arterial duplex, complete bilateral; Future          Subjective:      Patient ID: Hermelinda Marshall  is a 64 y o  male  Patient presents today to review the results of the CV and FREDO that were done on 7/7/21   Patient reports dizziness but denies all other TIA/CVA symptoms  Patient is c/o swelling in the left ankle that is worse by the end of day  Patient reports short distance claudication in the calves  Patient denies ulcers/wounds  Patient is taking ASA 81, Atorvastatin, and Plavix  Patient is smoking 1 PPD  HPI   75-year-old male with HTN, HLD, DM, CAD, CABG, AS, bilateral asymptomatic carotid stenosis, PAD status post left fem to anterior tibial bypass by Dr Felix Nevarez '15, s/p  Angioplasty to bypass graft stenoses '16, recurrent bypass graft stenosis who returns to the office to review LEAD and CV duplex 7/7/21   He is experiencing bilateral short distance calf claudication symptoms when tending to his garden  No ischemic rest pain or ischemic tissue loss  His main complaint is of left ankle swelling  This is very mild today  He also would like to discuss TAVR  He is reconsidering this  He denies any focal neurological events consistent with TIA/CVA  He does have intermittent dizziness with quick change of positions  LEAD showed severe diffuse RLE disease, R proximal PFA/mid SFA/popliteal 50-75% stenosis, distal SFA >75% stenosis, PT occluded,  R SAMEER 0 32/84/52 and L CFA 1 8cm, L CFA <50% stenosis, L PFA >75% stenosis, SL SFA to AT bypass with a >75% distal graft stenosis and 50-75% distal anastomosis stenosis, L SAMEER 0 64/166/78   CV duplex showed bilateral ICA 70% stenosis  The following portions of the patient's history were reviewed and updated as appropriate: allergies, current medications, past family history, past medical history, past social history, past surgical history and problem list   ROS reviewed    Review of Systems   Constitutional: Negative  HENT: Negative  Eyes: Negative  Respiratory: Negative  Cardiovascular: Positive for leg swelling  Gastrointestinal: Negative  Endocrine: Negative  Genitourinary: Negative  Musculoskeletal: Positive for back pain  Skin: Negative      Allergic/Immunologic: Negative  Neurological: Positive for dizziness  Hematological: Bruises/bleeds easily  Psychiatric/Behavioral: Negative  Objective:  I have reviewed and made appropriate changes to the review of systems input by the medical assistant      Vitals:    07/27/21 1345   BP: 126/52   BP Location: Left arm   Patient Position: Sitting   Cuff Size: Adult   Pulse: 57   Temp: 97 8 °F (36 6 °C)   TempSrc: Tympanic   Weight: 79 2 kg (174 lb 9 6 oz)   Height: 5' 10" (1 778 m)       Patient Active Problem List   Diagnosis    Atherosclerosis of native arteries of extremities with intermittent claudication, bilateral legs ([facility])    PAD (peripheral artery disease) ([facility])    Stenosis of noncoronary bypass graft ([facility])    DM (diabetes mellitus) with peripheral vascular complication ([facility])    Asymptomatic bilateral carotid artery stenosis    S/P CABG (coronary artery bypass graft)    Essential hypertension    Aorto-iliac disease ([facility])    Renal artery stenosis, native, bilateral ([address] )    Dyslipidemia    CAD (coronary artery disease)    Progressive angina ([facility])    Tension headache    Cigarette nicotine dependence with nicotine-induced disorder    Calcific tendinitis of right shoulder region    Right shoulder pain    Nonrheumatic aortic valve stenosis    Sinus bradycardia    CKD (chronic kidney disease), stage III ([facility])    Type 2 diabetes mellitus with stage 3 chronic kidney disease (HCC)    GERD (gastroesophageal reflux disease)    Hyperlipidemia       Past Surgical History:   Procedure Laterality Date    APPENDECTOMY      COLECTOMY      COLONOSCOPY  2013    CORONARY ARTERY BYPASS GRAFT  2013    X 2    FEMORAL ARTERY - POPLITEAL ARTERY BYPASS GRAFT      HEMORRHOID SURGERY      IR AORTAGRAM WITH RUN-OFF  11/19/2018    IA SLCTV CATHJ 3RD+ ORD SLCTV ABDL PEL/LXTR [facility] Left 8/12/2016    Procedure: LEFT FEMORAL ARTERIOGRAM; BALLOON ANGIOPLASTY; SFA  AND FEMORAL AT VEIN GRAFT;  Surgeon: Toro Perrin MD; Location: [facility];  Service: Vascular    TONSILLECTOMY AND ADENOIDECTOMY         Family History   Problem Relation Age of Onset    Heart attack Father     Other Sister         bypass and vlave replacement    Stroke Paternal Uncle     Arrhythmia Neg Hx     Asthma Neg Hx     Clotting disorder Neg Hx     Fainting Neg Hx     Anuerysm Neg Hx     Hypertension Neg Hx         unsure     Hyperlipidemia Neg Hx     Heart failure Neg Hx        Social History     Socioeconomic History    Marital status:      Spouse name: Not on file    Number of children: Not on file    Years of education: Not on file    Highest education level: Not on file   Occupational History    Not on file   Tobacco Use    Smoking status: Current Every Day Smoker     Packs/day: 1 00     Years: 50 00     Pack years: 50 00     Types: Cigarettes    Smokeless tobacco: Never Used   Vaping Use    Vaping Use: Never used   Substance and Sexual Activity    Alcohol use: Yes     Comment: occasional    Drug use: No    Sexual activity: Not on file   Other Topics Concern    Not on file   Social History Narrative    · Most recent tobacco use screenin2018      · Do you currently or have you served in [employer]: Yes      · If Yes, What branch of service:   [employer]      · Occupation:   Dentists      · Exercise level:   Occasional        · Caffeine intake:   Heavy      · Marital status:         · Diet:   Regular  low fat     · Seat belts used routinely:   Yes      · Smoke alarm in home: Yes      · Advance directive: Yes      · General stress level:   Low      Social Determinants of Health     Financial Resource Strain:     Difficulty of Paying Living Expenses:    Food Insecurity:     Worried About Running Out of Food in the Last Year:     [address] in the Last Year:    Transportation Needs:     Lack of Transportation (Medical):      Lack of Transportation (Non-Medical):    Physical Activity:     Days of Exercise per Week:     Minutes of Exercise per Session:    Stress:     Feeling of Stress :    Social Connections:     Frequency of Communication with Friends and Family:     Frequency of Social Gatherings with Friends and Family:     Attends [employer] Services:     Active Member of Clubs or Organizations:     Attends Club or Organization Meetings:     Marital Status:    Intimate Partner Violence:     Fear of Current or Ex-Partner:     Emotionally Abused:     Physically Abused:     Sexually Abused:        No Known Allergies      Current Outpatient Medications:     amLODIPine (NORVASC) 10 mg tablet, TAKE 1 TABLET DAILY, Disp: 90 tablet, Rfl: 3    AMYLASE-LIPASE-PROTEASE PO, Take by mouth , Disp: , Rfl:     aspirin (ECOTRIN LOW STRENGTH) 81 mg EC tablet, Take 81 mg by mouth daily, Disp: , Rfl:     atorvastatin (LIPITOR) 80 mg tablet, TAKE 1 TABLET DAILY AT     BEDTIME, Disp: 90 tablet, Rfl: 3    benazepril (LOTENSIN) 40 MG tablet, TAKE 1 TABLET DAILY, Disp: 90 tablet, Rfl: 3    clopidogrel (PLAVIX) 75 mg tablet, Take 1 tablet (75 mg total) by mouth daily, Disp: 90 tablet, Rfl: 3    Cyanocobalamin (VITAMIN B12 PO), Take by mouth once a week , Disp: , Rfl:     hydrALAZINE (APRESOLINE) 50 mg tablet, Take 1 tablet (50 mg total) by mouth 2 (two) times a day, Disp: 180 tablet, Rfl: 3    Magnesium Oxide (MAG-200 PO), Take by mouth once a week , Disp: , Rfl:     metFORMIN (GLUCOPHAGE) 500 mg tablet, TAKE 2 TABLETS TWICE A DAY, Disp: 360 tablet, Rfl: 1    Multiple Vitamin (MULTIVITAMIN) tablet, Take 1 tablet by mouth daily  , Disp: , Rfl:     pantoprazole (PROTONIX) 40 mg tablet, Take 1 tablet (40 mg total) by mouth daily, Disp: 90 tablet, Rfl: 1    Fluad Quadrivalent 0 5 ML PRSY, PHARMACY ADMINISTERED, Disp: , Rfl:       /52 (BP Location: Left arm, Patient Position: Sitting, Cuff Size: Adult)   Pulse 57   Temp 97 8 °F (36 6 °C) (Tympanic)   Ht 5' 10" (1 778 m)   Wt 79 2 kg (174 lb 9 6 oz)   BMI 25 05 kg/m²          Physical Exam  Vitals and nursing note reviewed  Constitutional:       Appearance: He is well-developed  HENT:      Head: Normocephalic and atraumatic  Cardiovascular:      Pulses:           Femoral pulses are 2+ on the right side and 2+ on the left side  Dorsalis pedis pulses are 0 on the right side and 0 on the left side  Posterior tibial pulses are 0 on the right side and 0 on the left side  Heart sounds: Normal heart sounds  Comments: Palpable L bypass pulse   Pulmonary:      Effort: Pulmonary effort is normal       Breath sounds: Normal breath sounds  Abdominal:      Palpations: Abdomen is soft  Musculoskeletal:         General: Normal range of motion  Cervical back: Neck supple  Comments: Trace left ankle swelling    Skin:     General: Skin is warm  Neurological:      Mental Status: He is alert and oriented to person, place, and time     Psychiatric:         Behavior: Behavior normal

## 2021-07-27 NOTE — ASSESSMENT & PLAN NOTE
-CV duplex 7/7 showed bilateral ICA stenosis 70+% stenosis, R JESIKA velocities 318/74, ratio 4 14 and left ICA velocities 294/47, ratio 2 69   -Reviewed with Dr Carole Borges with medical management  -Repeat duplex in 6 months  -Reviewed s/s of stroke/TIA and that he call 911 immediately should he experience symptoms

## 2021-07-27 NOTE — ASSESSMENT & PLAN NOTE
78-year-old male with HTN, HLD, DM, CAD, CABG, AS, bilateral asymptomatic carotid stenosis, PAD status post left fem to anterior tibial bypass by Dr Jenaro Simmons '15, s/p  Angioplasty to bypass graft stenoses '16, recurrent bypass graft stenosis who returns to the office to review LEAD and CV duplex 7/7/21   -LEAD showed severe diffuse RLE disease, R proximal PFA/mid SFA/popliteal 50-75% stenosis, distal SFA >75% stenosis, PT occluded,  R SAMEER 0 32/84/52 and L CFA 1 8cm, L CFA <50% stenosis, L PFA >75% stenosis, SL SFA to AT bypass with a >75% distal graft stenosis and 50-75% distal anastomosis stenosis, L SAMEER 0 64/166/78   -Short distance claudication symptoms  No ischemic rest pain or ischemic tissue loss   -Repeat duplex in 6 months   -continue ASA, Plavix and statin therapy   -Counseled on the importance of smoking cessation  He states it is too late to quit now    Needs additional counseling   -Counseled on the importance of proper foot care and avoiding injury to foot   -Follow up after duplex with Dr Ruth Villegas to review studies

## 2021-07-27 NOTE — PATIENT INSTRUCTIONS
Repeat duplex in 6 months and follow up in the office in 6 months with Dr Harriet Padilla to review study

## 2021-08-31 ENCOUNTER — OFFICE VISIT (OUTPATIENT)
Dept: CARDIOLOGY CLINIC | Facility: MEDICAL CENTER | Age: 76
End: 2021-08-31
Payer: MEDICARE

## 2021-08-31 VITALS
HEIGHT: 70 IN | BODY MASS INDEX: 24.9 KG/M2 | WEIGHT: 173.9 LBS | SYSTOLIC BLOOD PRESSURE: 142 MMHG | HEART RATE: 59 BPM | OXYGEN SATURATION: 97 % | DIASTOLIC BLOOD PRESSURE: 60 MMHG

## 2021-08-31 DIAGNOSIS — I10 ESSENTIAL HYPERTENSION: Primary | Chronic | ICD-10-CM

## 2021-08-31 DIAGNOSIS — Z95.1 S/P CABG (CORONARY ARTERY BYPASS GRAFT): Chronic | ICD-10-CM

## 2021-08-31 DIAGNOSIS — I35.0 NONRHEUMATIC AORTIC VALVE STENOSIS: ICD-10-CM

## 2021-08-31 DIAGNOSIS — I70.213 ATHEROSCLEROSIS OF NATIVE ARTERIES OF EXTREMITIES WITH INTERMITTENT CLAUDICATION, BILATERAL LEGS (HCC): ICD-10-CM

## 2021-08-31 DIAGNOSIS — E78.5 DYSLIPIDEMIA: Chronic | ICD-10-CM

## 2021-08-31 DIAGNOSIS — I73.9 PAD (PERIPHERAL ARTERY DISEASE) (HCC): ICD-10-CM

## 2021-08-31 DIAGNOSIS — I25.10 CORONARY ARTERY DISEASE INVOLVING NATIVE HEART WITHOUT ANGINA PECTORIS, UNSPECIFIED VESSEL OR LESION TYPE: ICD-10-CM

## 2021-08-31 PROCEDURE — 99214 OFFICE O/P EST MOD 30 MIN: CPT | Performed by: INTERNAL MEDICINE

## 2021-08-31 NOTE — PROGRESS NOTES
Cardiology   Romel Cardenas  64 y o  male MRN: [id number]        Impression:  1  Coronary artery disease s/p CABG - doing well  2  Hypertension - borderline control  On Hydralazine   Not optimal treatment regimen, but was getting headaches with metoprolol  3  Severe bilateral carotid disease - followed by vascular surgery  4  Dyslipidemia - doing well  5  Peripheral arterial disease - s/p LLE revascularization  Doing well  6  Aortic stenosis - severe as of echo 5/21  Thinking about TAVR  7  Bradycardia - improved off b-blockers          Recommendations:  1  Continue current medications  2  Follow up with Vascular Surgery  3  Follow up with CT surgery for TAVR  4   Follow up in 6 months        HPI: Romel Cardenas  is a 64y o  year old male with coronary artery disease s/p CABG and LLE revascularization, severe aortic stenosis,  who returns for follow up  No chest pain, shortness of breath, or palpitations  Rare dizziness    Recent echo 5/21 demonstrated normal LV systolic function with severe aortic stenosis   Holter monitor demonstrated occasional bradycardia with maximum of 2 7 second pauses   Major complaint is cramping in the leg  Patient thinking about getting TAVR          Review of Systems   Constitutional: Negative  HENT: Negative  Eyes: Negative  Respiratory: Negative for chest tightness and shortness of breath  Cardiovascular: Negative for chest pain, palpitations and leg swelling  Claudication   Gastrointestinal: Negative  Endocrine: Negative  Genitourinary: Negative  Musculoskeletal: Negative  Skin: Negative  Allergic/Immunologic: Negative  Neurological: Negative  Hematological: Negative  Psychiatric/Behavioral: Negative  All other systems reviewed and are negative          Past Medical History:   Diagnosis Date    Arteriosclerosis of arteries of extremities ([facility] [address]     CAD (coronary artery disease)     [facility]     Dyslipidemia     Endocrine pancreas disorder     GERD (gastroesophageal reflux disease)     Hyperlipidemia     Hypertension     Kidney stones     PAD (peripheral artery disease) (HCC)      Past Surgical History:   Procedure Laterality Date    APPENDECTOMY      COLECTOMY      COLONOSCOPY  2013    CORONARY ARTERY BYPASS GRAFT  2013    X 2    FEMORAL ARTERY - POPLITEAL ARTERY BYPASS GRAFT      HEMORRHOID SURGERY      IR AORTAGRAM WITH RUN-OFF  11/19/2018    GA SLCTV CATHJ 3RD+ ORD SLCTV ABDL PEL/LXTR [facility] Left 8/12/2016    Procedure: LEFT FEMORAL ARTERIOGRAM; BALLOON ANGIOPLASTY; SFA  AND FEMORAL AT VEIN GRAFT;  Surgeon: Sebas Riley MD;  Location: [facility];  Service: Vascular    TONSILLECTOMY AND ADENOIDECTOMY       Social History     Substance and Sexual Activity   Alcohol Use Yes    Comment: occasional     Social History     Substance and Sexual Activity   Drug Use No     Social History     Tobacco Use   Smoking Status Current Every Day Smoker    Packs/day: 1 00    Years: 50 00    Pack years: 50 00    Types: Cigarettes   Smokeless Tobacco Never Used     Family History   Problem Relation Age of Onset    Heart attack Father     Other Sister         bypass and vlave replacement    Stroke Paternal Uncle     Arrhythmia Neg Hx     Asthma Neg Hx     Clotting disorder Neg Hx     Fainting Neg Hx     Anuerysm Neg Hx     Hypertension Neg Hx         unsure     Hyperlipidemia Neg Hx     Heart failure Neg Hx        Allergies:  No Known Allergies    Medications:     Current Outpatient Medications:     amLODIPine (NORVASC) 10 mg tablet, TAKE 1 TABLET DAILY, Disp: 90 tablet, Rfl: 3    AMYLASE-LIPASE-PROTEASE PO, Take by mouth , Disp: , Rfl:     aspirin (ECOTRIN LOW STRENGTH) 81 mg EC tablet, Take 81 mg by mouth daily, Disp: , Rfl:     atorvastatin (LIPITOR) 80 mg tablet, TAKE 1 TABLET DAILY AT     BEDTIME, Disp: 90 tablet, Rfl: 3    benazepril (LOTENSIN) 40 MG tablet, TAKE 1 TABLET DAILY, Disp: 90 tablet, Rfl: 3    clopidogrel (PLAVIX) 75 mg tablet, Take 1 tablet (75 mg total) by mouth daily, Disp: 90 tablet, Rfl: 3    Cyanocobalamin (VITAMIN B12 PO), Take by mouth once a week , Disp: , Rfl:     hydrALAZINE (APRESOLINE) 50 mg tablet, Take 1 tablet (50 mg total) by mouth 2 (two) times a day, Disp: 180 tablet, Rfl: 3    Magnesium Oxide (MAG-200 PO), Take by mouth once a week , Disp: , Rfl:     metFORMIN (GLUCOPHAGE) 500 mg tablet, TAKE 2 TABLETS TWICE A DAY, Disp: 360 tablet, Rfl: 1    Multiple Vitamin (MULTIVITAMIN) tablet, Take 1 tablet by mouth daily  , Disp: , Rfl:     pantoprazole (PROTONIX) 40 mg tablet, Take 1 tablet (40 mg total) by mouth daily, Disp: 90 tablet, Rfl: 1    Fluad Quadrivalent 0 5 ML PRSY, PHARMACY ADMINISTERED, Disp: , Rfl:       Wt Readings from Last 3 Encounters:   08/31/21 78 9 kg (173 lb 14 4 oz)   08/31/21 78 9 kg (174 lb)   07/27/21 79 2 kg (174 lb 9 6 oz)     Temp Readings from Last 3 Encounters:   07/27/21 97 8 °F (36 6 °C) (Tympanic)   06/04/21 98 2 °F (36 8 °C) (Temporal)   05/20/21 (!) 96 7 °F (35 9 °C) (Tympanic)     BP Readings from Last 3 Encounters:   08/31/21 142/60   07/27/21 126/52   06/04/21 140/58     Pulse Readings from Last 3 Encounters:   08/31/21 59   07/27/21 57   06/04/21 58         Physical Exam  HENT:      Head: Atraumatic  Mouth/Throat:      Mouth: Mucous membranes are moist    Eyes:      Extraocular Movements: Extraocular movements intact  Cardiovascular:      Rate and Rhythm: Normal rate and regular rhythm  Pulses:           Carotid pulses are 2+ on the right side and 2+ on the left side  Heart sounds: Murmur heard  Systolic murmur is present with a grade of 2/6  Pulmonary:      Effort: Pulmonary effort is normal       Breath sounds: Normal breath sounds  Abdominal:      General: Abdomen is flat  Musculoskeletal:         General: Normal range of motion  Cervical back: Normal range of motion     Skin:     General: Skin is warm  Neurological:      General: No focal deficit present  Mental Status: He is alert and oriented to person, place, and time  Psychiatric:         Mood and Affect: Mood normal          Behavior: Behavior normal            Laboratory Studies:  CMP:  Lab Results   Component Value Date     2015    K 4 6 2021     2021    CO2 22 2021    ANIONGAP 7 2015    BUN 31 (H) 2021    CREATININE 1 84 (H) 2021    GLUCOSE 125 2015    AST 16 2021    ALT 26 2021    EGFR 34 2021       Lipid Profile:   No results found for: CHOL  Lab Results   Component Value Date    HDL 31 (L) 2021     Lab Results   Component Value Date    LDLCALC 36 2021     Lab Results   Component Value Date    TRIG 90 2021       Cardiac testing:     Results for orders placed during the hospital encounter of 21    Echo complete with contrast if indicated    Narrative  [place] [address], [address] [place] [address]  [phone]    Transthoracic Echocardiogram  2D, M-mode, Doppler, and Color Doppler    Study date:  06-May-2021    Patient: León Rodgers  MR number: [de-identified]  Account number: [de-identified]  : 1945  Age: 64 years  Gender: Male  Status: Outpatient  Location: [place] [address]   Height: 70 in  Weight: 181 lb  BP: 150/ 58 mmHg    Indications: Assess the aortic valve  Diagnoses: I35 0 - Nonrheumatic aortic (valve) stenosis    Sonographer:  Estrella Morrell RDCS  Primary Physician:  Juliana Siddiqi MD  Referring Physician:  Kailey Zavala MD  Group:  [facility]  Interpreting Physician:  Vero Guillory MD    SUMMARY    LEFT VENTRICLE:  Systolic function was normal  Ejection fraction was estimated to be 60 %  There were no regional wall motion abnormalities  Wall thickness was mildly increased    Doppler parameters were consistent with abnormal left ventricular relaxation (grade 1 diastolic dysfunction)  LEFT ATRIUM:  The atrium was mildly dilated  MITRAL VALVE:  There was mild regurgitation  AORTIC VALVE:  The valve was probably trileaflet  Leaflets exhibited moderately increased thickness and moderate calcification  There was severe stenosis  There was trace regurgitation  Valve peak gradient was 63 mmHg  Valve mean gradient was 32 mmHg  Aortic valve area was 1 cmï¾² by the continuity equation  Estimated aortic valve area (by VTI) was 0 91 cmï¾²  TRICUSPID VALVE:  There was trace regurgitation  HISTORY: PRIOR HISTORY: CAD, bradycardia, DM, dyslipidemia, PAD, CABG,    PROCEDURE: The study was performed in the [address]  This was a routine study  The transthoracic approach was used  The study included complete 2D imaging, M-mode, complete spectral Doppler, and color Doppler  The heart rate was  66 bpm, at the start of the study  Images were obtained from the parasternal, apical, subcostal, and suprasternal notch acoustic windows  Image quality was adequate  LEFT VENTRICLE: Size was normal  Systolic function was normal  Ejection fraction was estimated to be 60 %  There were no regional wall motion abnormalities  Wall thickness was mildly increased  DOPPLER: Doppler parameters were consistent  with abnormal left ventricular relaxation (grade 1 diastolic dysfunction)  RIGHT VENTRICLE: The size was normal  Systolic function was normal  Wall thickness was normal     LEFT ATRIUM: The atrium was mildly dilated  RIGHT ATRIUM: Size was normal     MITRAL VALVE: Valve structure was normal  There was normal leaflet separation  DOPPLER: The transmitral velocity was within the normal range  There was no evidence for stenosis  There was mild regurgitation  AORTIC VALVE: The valve was probably trileaflet  Leaflets exhibited moderately increased thickness and moderate calcification  DOPPLER: There was severe stenosis  There was trace regurgitation      TRICUSPID VALVE: The valve structure was normal  There was normal leaflet separation  DOPPLER: The transtricuspid velocity was within the normal range  There was no evidence for stenosis  There was trace regurgitation  PULMONIC VALVE: Leaflets exhibited normal thickness, no calcification, and normal cuspal separation  DOPPLER: The transpulmonic velocity was within the normal range  There was no significant regurgitation  PERICARDIUM: There was no pericardial effusion  The pericardium was normal in appearance  AORTA: The root exhibited normal size  SYSTEMIC VEINS: IVC: The inferior vena cava was normal in size  MEASUREMENT TABLES    2D MEASUREMENTS  LVOT   (Reference normals)  Diam   23 mm   (--)    DOPPLER MEASUREMENTS  LVOT   (Reference normals)  VTI   24 cm   (--)  R-R interval   1017 ms   (--)  HR   59 bpm   (--)  Stroke vol   99 71 ml   (--)  Cardiac output   5 88 L/min   (--)  Cardiac index   2 94 L/min/mï¾²   (--)  Aortic valve   (Reference normals)  VTI   108 cm   (--)  Peak gradient   63 mmHg   (--)  Mean gradient   32 mmHg   (--)  Valve area, cont   1 cmï¾²   (--)  Obstr index, VTI   0 22    (--)  Valve area, VTI   0 91 cmï¾²   (--)  Area index, VTI   0 46 cmï¾²/mï¾²   (--)    SYSTEM MEASUREMENT TABLES    2D  %FS: 23 52 %  Ao Diam: 3 06 cm  EDV(Teich): 137 67 ml  EF(Teich): 46 6 %  ESV(Teich): 73 51 ml  IVSd: 1 26 cm  LA Area: 24 18 cm2  LA Diam: 4 19 cm  LVEDV MOD A4C: 198 32 ml  LVEF MOD A4C: 54 08 %  LVESV MOD A4C: 91 07 ml  LVIDd: 5 34 cm  LVIDs: 4 08 cm  LVLd A4C: 10 15 cm  LVLs A4C: 8 98 cm  LVOT Diam: 2 43 cm  LVPWd: 1 18 cm  RA Area: 14 06 cm2  RVIDd: 3 75 cm  SV MOD A4C: 107 24 ml  SV(Teich): 64 16 ml    CW  AV Env  Ti: 414 84 ms  AV MaxP 54 mmHg  AV VTI: 103 91 cm  AV Vmax: 3 82 m/s  AV Vmean: 2 5 m/s  AV meanP 08 mmHg    MM  TAPSE: 2 13 cm    PW  LUIS (VTI): 1 06 cm2  LUIS Vmax: 1 04 cm2  E' Sept: 0 04 m/s  E/E' Sept: 22 83  LVOT Env  Ti: 482 08 ms  LVOT VTI: 23 7 cm  LVOT Vmax: 0 86 m/s  LVOT Vmean: 0 5 m/s  LVOT maxP 94 mmHg  LVOT meanP 25 mmHg  LVSV Dopp: 110 25 ml  MV A Pop: 1 23 m/s  MV Dec [place]: 5 05 m/s2  MV DecT: 180 67 ms  MV E Pop: 0 91 m/s  MV E/A Ratio: 0 74  MV PHT: 52 39 ms  MVA By PHT: 4 2 cm2    [address] [facility]    Prepared and electronically signed by    Ron Szymanski MD  Signed 06-May-2021 16:40:31    No results found for this or any previous visit  No results found for this or any previous visit  No results found for this or any previous visit

## 2021-08-31 NOTE — PATIENT INSTRUCTIONS
Recommendations:  1  Continue current medications  2  Follow up with Vascular Surgery  3  Follow up with CT surgery for TAVR    4  Follow up in 6 months

## 2021-09-09 NOTE — ASSESSMENT & PLAN NOTE
Patient is stable with current meds and discussed a low carb diet  Pt  recommended to see eye doctor and foot doctor for routine screening as per protocol  Recheck A1C  and Cr in 3 months  Patient questions answered today about this condtion    Lab Results   Component Value Date    HGBA1C 5 9 (H) 06/30/2021

## 2021-09-09 NOTE — ASSESSMENT & PLAN NOTE
Patient to continue  with current cardiac meds to decrease risk of re stenosis  Patient to follow up with cardiology for scheduled appointments to decrease risk for further cardiac problems with appropriate diagnostic testing to reassess cardiac status  Patient had alll questions about this problem answered today

## 2021-09-09 NOTE — PROGRESS NOTES
Falls Plan of Care: balance, strength, and gait training instructions were provided  Home safety education provided  Assessment/Plan:         Problem List Items Addressed This Visit        Digestive    GERD (gastroesophageal reflux disease)     Patient to continue with present therapy and decrease caffeine, avoid ETOH and smoking to decrease acid production  Pt should also cease eating prior to bedtime and avoid excessive fluid intake prior to sleep  May use antacids as needed for breakthrough GERD  All pateint questions answered today about this condition  Endocrine    Type 2 diabetes mellitus with stage 3 chronic kidney disease ([facility] [address] )     Patient is stable with current meds and discussed a low carb diet  Pt  recommended to see eye doctor and foot doctor for routine screening as per protocol  Recheck A1C  and Cr in 3 months  Patient questions answered today about this condtion  Lab Results   Component Value Date    HGBA1C 5 9 (H) 06/30/2021               Cardiovascular and Mediastinum    Essential hypertension (Chronic)     Patient is stable with current anti-hypertensive medicine and continue to follow a low sodium diet and take current medication  All questions about this condition were answered today  Atherosclerosis of native arteries of extremities with intermittent claudication, bilateral legs (HCC)     Patient is stable  and will continue present plan of care and reassess at next routine visit  All questions about this problem from patient were answered today  CAD (coronary artery disease)     Patient to continue  with current cardiac meds to decrease risk of re stenosis  Patient to follow up with cardiology for scheduled appointments to decrease risk for further cardiac problems with appropriate diagnostic testing to reassess cardiac status  Patient had alll questions about this problem answered today              Other    Hyperlipidemia     Patient  is stable with current medication and we discussed a low fat low cholesterol diet  Weight loss also discussed for this will help lower cholesterol also  Recheck lipids in 6 months  Other Visit Diagnoses     Need for hepatitis C screening test    -  Primary            Subjective:      Patient ID: Dolores Garcia  is a 64 y o  male  This 53-year-old white male here today for checkup on coronary artery disease hypertension hyperlipidemia aortic stenosis as well as a peripheral vascular disease with multiple corrective surgeries to fix circulation  Patient is doing well with his medicines not need refills today we discussed a leg his use of medications with his kidney function which is stage IIIB  Patient was to hold off any Nephrology input to decide if he is going to get his TAVR or an open procedure for his visit aortic valve replacement  The following portions of the patient's history were reviewed and updated as appropriate:   Past Medical History:  He has a past medical history of Arteriosclerosis of arteries of extremities ([facility] [address] CAD (coronary artery disease), Diabetes ([facility] [address] ), Dyslipidemia, Endocrine pancreas disorder, GERD (gastroesophageal reflux disease), Hyperlipidemia, Hypertension, Kidney stones, and PAD (peripheral artery disease) ([facility] [address] )  ,  _______________________________________________________________________  Medical Problems:  does not have any pertinent problems on file ,  _______________________________________________________________________  Past Surgical History:   has a past surgical history that includes Coronary artery bypass graft (2013); Femoral artery - popliteal artery bypass graft; Appendectomy; Colectomy; Tonsillectomy and adenoidectomy; pr slctv cathj 3rd+ ord slctv abdl pel/lxtr brnch (Left, 8/12/2016); Colonoscopy (2013); Hemorrhoid surgery; and IR aortagram with run-off (11/19/2018)  ,  _______________________________________________________________________  Family History:  family history includes Heart attack in his father; Other in his sister; Stroke in his paternal uncle ,  _______________________________________________________________________  Social History:   reports that he has been smoking cigarettes  He has a 50 00 pack-year smoking history  He has never used smokeless tobacco  He reports current alcohol use  He reports that he does not use drugs  ,  _______________________________________________________________________  Allergies:  has No Known Allergies     _______________________________________________________________________  Current Outpatient Medications   Medication Sig Dispense Refill    amLODIPine (NORVASC) 10 mg tablet TAKE 1 TABLET DAILY 90 tablet 3    AMYLASE-LIPASE-PROTEASE PO Take by mouth       aspirin (ECOTRIN LOW STRENGTH) 81 mg EC tablet Take 81 mg by mouth daily      atorvastatin (LIPITOR) 80 mg tablet TAKE 1 TABLET DAILY AT     BEDTIME 90 tablet 3    benazepril (LOTENSIN) 40 MG tablet TAKE 1 TABLET DAILY 90 tablet 3    clopidogrel (PLAVIX) 75 mg tablet Take 1 tablet (75 mg total) by mouth daily 90 tablet 3    Cyanocobalamin (VITAMIN B12 PO) Take by mouth once a week       Fluad Quadrivalent 0 5 ML PRSY PHARMACY ADMINISTERED      hydrALAZINE (APRESOLINE) 50 mg tablet Take 1 tablet (50 mg total) by mouth 2 (two) times a day 180 tablet 3    Magnesium Oxide (MAG-200 PO) Take by mouth once a week       metFORMIN (GLUCOPHAGE) 500 mg tablet TAKE 2 TABLETS TWICE A  tablet 1    Multiple Vitamin (MULTIVITAMIN) tablet Take 1 tablet by mouth daily   pantoprazole (PROTONIX) 40 mg tablet Take 1 tablet (40 mg total) by mouth daily 90 tablet 1     No current facility-administered medications for this visit      _______________________________________________________________________  Review of Systems   Constitutional: Negative for activity change, appetite change, chills, fatigue, fever and unexpected weight change     HENT: Negative for congestion, ear pain, hearing loss, mouth sores, postnasal drip, sinus pressure, sinus pain, sneezing and sore throat  Respiratory: Negative for apnea, cough, shortness of breath and wheezing  Cardiovascular: Negative for chest pain, palpitations and leg swelling  Gastrointestinal: Negative for abdominal pain, constipation, diarrhea, nausea and vomiting  Endocrine: Negative for cold intolerance and heat intolerance  Genitourinary: Negative for dysuria, frequency and hematuria  Musculoskeletal: Negative for arthralgias, back pain, gait problem, joint swelling and neck pain  Skin: Negative for rash  Neurological: Negative for dizziness, weakness and numbness  Hematological: Does not bruise/bleed easily  Psychiatric/Behavioral: Negative for agitation, behavioral problems, confusion, hallucinations and sleep disturbance  The patient is not nervous/anxious  Objective: There were no vitals filed for this visit  There is no height or weight on file to calculate BMI  Physical Exam  Vitals and nursing note reviewed  Constitutional:       Appearance: He is well-developed  HENT:      Head: Normocephalic and atraumatic  Nose: Nose normal       Mouth/Throat:      Mouth: Mucous membranes are moist    Eyes:      General: No scleral icterus  Conjunctiva/sclera: Conjunctivae normal       Pupils: Pupils are equal, round, and reactive to light  Neck:      Thyroid: No thyromegaly  Cardiovascular:      Rate and Rhythm: Normal rate and regular rhythm  Heart sounds: Normal heart sounds  Pulmonary:      Effort: Pulmonary effort is normal  No respiratory distress  Breath sounds: Normal breath sounds  No wheezing  Abdominal:      General: Bowel sounds are normal       Palpations: Abdomen is soft  Tenderness: There is no abdominal tenderness  There is no guarding or rebound  Musculoskeletal:         General: Normal range of motion        Cervical back: Normal range of motion and neck supple  Skin:     General: Skin is warm and dry  Findings: No rash  Neurological:      Mental Status: He is alert and oriented to person, place, and time  Psychiatric:         Mood and Affect: Mood normal          Behavior: Behavior normal          Thought Content:  Thought content normal          Judgment: Judgment normal

## 2021-09-10 ENCOUNTER — OFFICE VISIT (OUTPATIENT)
Dept: FAMILY MEDICINE CLINIC | Facility: CLINIC | Age: 76
End: 2021-09-10
Payer: MEDICARE

## 2021-09-10 VITALS
HEART RATE: 54 BPM | OXYGEN SATURATION: 97 % | TEMPERATURE: 97.9 F | HEIGHT: 70 IN | BODY MASS INDEX: 24.77 KG/M2 | SYSTOLIC BLOOD PRESSURE: 140 MMHG | WEIGHT: 173 LBS | RESPIRATION RATE: 16 BRPM | DIASTOLIC BLOOD PRESSURE: 48 MMHG

## 2021-09-10 DIAGNOSIS — N18.32 TYPE 2 DIABETES MELLITUS WITH STAGE 3B CHRONIC KIDNEY DISEASE, WITHOUT LONG-TERM CURRENT USE OF INSULIN (HCC): ICD-10-CM

## 2021-09-10 DIAGNOSIS — E11.22 TYPE 2 DIABETES MELLITUS WITH STAGE 3B CHRONIC KIDNEY DISEASE, WITHOUT LONG-TERM CURRENT USE OF INSULIN (HCC): ICD-10-CM

## 2021-09-10 DIAGNOSIS — E78.2 MIXED HYPERLIPIDEMIA: ICD-10-CM

## 2021-09-10 DIAGNOSIS — I70.45: ICD-10-CM

## 2021-09-10 DIAGNOSIS — Z11.59 NEED FOR HEPATITIS C SCREENING TEST: Primary | ICD-10-CM

## 2021-09-10 DIAGNOSIS — I10 ESSENTIAL HYPERTENSION: Chronic | ICD-10-CM

## 2021-09-10 DIAGNOSIS — I70.213 ATHEROSCLEROSIS OF NATIVE ARTERIES OF EXTREMITIES WITH INTERMITTENT CLAUDICATION, BILATERAL LEGS (HCC): ICD-10-CM

## 2021-09-10 DIAGNOSIS — I25.10 CORONARY ARTERY DISEASE INVOLVING NATIVE HEART WITHOUT ANGINA PECTORIS, UNSPECIFIED VESSEL OR LESION TYPE: ICD-10-CM

## 2021-09-10 DIAGNOSIS — K21.9 GASTROESOPHAGEAL REFLUX DISEASE WITHOUT ESOPHAGITIS: ICD-10-CM

## 2021-09-10 PROCEDURE — 99214 OFFICE O/P EST MOD 30 MIN: CPT | Performed by: FAMILY MEDICINE

## 2021-09-24 ENCOUNTER — APPOINTMENT (OUTPATIENT)
Dept: LAB | Facility: MEDICAL CENTER | Age: 76
End: 2021-09-24
Payer: MEDICARE

## 2021-09-24 DIAGNOSIS — Z11.59 NEED FOR HEPATITIS C SCREENING TEST: ICD-10-CM

## 2021-09-24 DIAGNOSIS — N18.32 TYPE 2 DIABETES MELLITUS WITH STAGE 3B CHRONIC KIDNEY DISEASE, WITHOUT LONG-TERM CURRENT USE OF INSULIN (HCC): ICD-10-CM

## 2021-09-24 DIAGNOSIS — E11.22 TYPE 2 DIABETES MELLITUS WITH STAGE 3B CHRONIC KIDNEY DISEASE, WITHOUT LONG-TERM CURRENT USE OF INSULIN (HCC): ICD-10-CM

## 2021-09-24 LAB
ALBUMIN SERPL BCP-MCNC: 3.3 G/DL (ref 3.5–5)
ALP SERPL-CCNC: 69 U/L (ref 46–116)
ALT SERPL W P-5'-P-CCNC: 17 U/L (ref 12–78)
ANION GAP SERPL CALCULATED.3IONS-SCNC: 6 MMOL/L (ref 4–13)
AST SERPL W P-5'-P-CCNC: 10 U/L (ref 5–45)
BILIRUB SERPL-MCNC: 0.4 MG/DL (ref 0.2–1)
BUN SERPL-MCNC: 20 MG/DL (ref 5–25)
CALCIUM ALBUM COR SERPL-MCNC: 9.4 MG/DL (ref 8.3–10.1)
CALCIUM SERPL-MCNC: 8.8 MG/DL (ref 8.3–10.1)
CHLORIDE SERPL-SCNC: 106 MMOL/L (ref 100–108)
CO2 SERPL-SCNC: 27 MMOL/L (ref 21–32)
CREAT SERPL-MCNC: 1.58 MG/DL (ref 0.6–1.3)
EST. AVERAGE GLUCOSE BLD GHB EST-MCNC: 126 MG/DL
GFR SERPL CREATININE-BSD FRML MDRD: 41 ML/MIN/1.73SQ M
GLUCOSE SERPL-MCNC: 158 MG/DL (ref 65–140)
HBA1C MFR BLD: 6 %
HCV AB SER QL: NORMAL
POTASSIUM SERPL-SCNC: 4.2 MMOL/L (ref 3.5–5.3)
PROT SERPL-MCNC: 7.4 G/DL (ref 6.4–8.2)
SODIUM SERPL-SCNC: 139 MMOL/L (ref 136–145)

## 2021-09-24 PROCEDURE — 86803 HEPATITIS C AB TEST: CPT

## 2021-09-24 PROCEDURE — 36415 COLL VENOUS BLD VENIPUNCTURE: CPT

## 2021-09-24 PROCEDURE — 80053 COMPREHEN METABOLIC PANEL: CPT

## 2021-09-24 PROCEDURE — 83036 HEMOGLOBIN GLYCOSYLATED A1C: CPT

## 2021-10-15 ENCOUNTER — OFFICE VISIT (OUTPATIENT)
Dept: URGENT CARE | Facility: MEDICAL CENTER | Age: 76
End: 2021-10-15
Payer: MEDICARE

## 2021-10-15 VITALS
HEIGHT: 70 IN | OXYGEN SATURATION: 97 % | WEIGHT: 173 LBS | HEART RATE: 63 BPM | DIASTOLIC BLOOD PRESSURE: 74 MMHG | RESPIRATION RATE: 18 BRPM | SYSTOLIC BLOOD PRESSURE: 181 MMHG | TEMPERATURE: 97.6 F | BODY MASS INDEX: 24.77 KG/M2
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
## 2021-10-15 DIAGNOSIS — S50.819A CAT SCRATCH OF FOREARM, UNSPECIFIED LATERALITY, INITIAL ENCOUNTER: Primary | ICD-10-CM

## 2021-10-15 PROCEDURE — 99213 OFFICE O/P EST LOW 20 MIN: CPT | Performed by: PHYSICIAN ASSISTANT

## 2021-10-15 PROCEDURE — G0463 HOSPITAL OUTPT CLINIC VISIT: HCPCS | Performed by: PHYSICIAN ASSISTANT

## 2021-10-15 RX ORDER — AZITHROMYCIN 250 MG/1
TABLET, FILM COATED ORAL
Qty: 6 TABLET | Refills: 0 | Status: SHIPPED | OUTPATIENT
Start: 2021-10-15 | End: 2021-10-19

## 2021-11-16 DIAGNOSIS — I10 HYPERTENSION, UNSPECIFIED TYPE: ICD-10-CM

## 2021-11-16 DIAGNOSIS — R00.1 BRADYCARDIA: ICD-10-CM

## 2021-11-16 DIAGNOSIS — I25.10 CORONARY ARTERY DISEASE INVOLVING NATIVE HEART WITHOUT ANGINA PECTORIS, UNSPECIFIED VESSEL OR LESION TYPE: ICD-10-CM

## 2021-11-16 RX ORDER — CLOPIDOGREL BISULFATE 75 MG/1
TABLET ORAL
Qty: 90 TABLET | Refills: 3 | Status: ON HOLD | OUTPATIENT
Start: 2021-11-16 | End: 2022-05-25 | Stop reason: SDUPTHER

## 2021-11-16 RX ORDER — HYDRALAZINE HYDROCHLORIDE 50 MG/1
TABLET, FILM COATED ORAL
Qty: 180 TABLET | Refills: 3 | Status: SHIPPED | OUTPATIENT
Start: 2021-11-16 | End: 2022-01-10 | Stop reason: SDUPTHER

## 2021-12-07 ENCOUNTER — APPOINTMENT (EMERGENCY)
Dept: CT IMAGING | Facility: HOSPITAL | Age: 76
End: 2021-12-07
Payer: MEDICARE

## 2021-12-07 ENCOUNTER — HOSPITAL ENCOUNTER (EMERGENCY)
Facility: HOSPITAL | Age: 76
Discharge: HOME | End: 2021-12-07
Attending: EMERGENCY MEDICINE
Payer: MEDICARE

## 2021-12-07 VITALS
SYSTOLIC BLOOD PRESSURE: 158 MMHG | OXYGEN SATURATION: 99 % | HEART RATE: 63 BPM | RESPIRATION RATE: 18 BRPM | DIASTOLIC BLOOD PRESSURE: 70 MMHG | TEMPERATURE: 98 F

## 2021-12-07 DIAGNOSIS — N17.9 AKI (ACUTE KIDNEY INJURY) (HCC): ICD-10-CM

## 2021-12-07 DIAGNOSIS — R19.7 ACUTE DIARRHEA: Primary | ICD-10-CM

## 2021-12-07 DIAGNOSIS — K52.9 ACUTE COLITIS: ICD-10-CM

## 2021-12-07 LAB
ALBUMIN SERPL BCP-MCNC: 3.4 G/DL (ref 3.5–5)
ALP SERPL-CCNC: 55 U/L (ref 46–116)
ALT SERPL W P-5'-P-CCNC: 9 U/L (ref 12–78)
ANION GAP SERPL CALCULATED.3IONS-SCNC: 12 MMOL/L (ref 4–13)
AST SERPL W P-5'-P-CCNC: 13 U/L (ref 5–45)
BASOPHILS # BLD AUTO: 0.03 THOUSANDS/ΜL (ref 0–0.1)
BASOPHILS NFR BLD AUTO: 0 % (ref 0–1)
BILIRUB SERPL-MCNC: 0.38 MG/DL (ref 0.2–1)
BUN SERPL-MCNC: 31 MG/DL (ref 5–25)
CALCIUM ALBUM COR SERPL-MCNC: 9.7 MG/DL (ref 8.3–10.1)
CALCIUM SERPL-MCNC: 9.2 MG/DL (ref 8.3–10.1)
CHLORIDE SERPL-SCNC: 105 MMOL/L (ref 100–108)
CO2 SERPL-SCNC: 21 MMOL/L (ref 21–32)
CREAT SERPL-MCNC: 2.1 MG/DL (ref 0.6–1.3)
EOSINOPHIL # BLD AUTO: 0.11 THOUSAND/ΜL (ref 0–0.61)
EOSINOPHIL NFR BLD AUTO: 2 % (ref 0–6)
ERYTHROCYTE [DISTWIDTH] IN BLOOD BY AUTOMATED COUNT: 14.1 % (ref 11.6–15.1)
GFR SERPL CREATININE-BSD FRML MDRD: 29 ML/MIN/1.73SQ M
GLUCOSE SERPL-MCNC: 113 MG/DL (ref 65–140)
HCT VFR BLD AUTO: 33.7 % (ref 36.5–49.3)
HGB BLD-MCNC: 10.6 G/DL (ref 12–17)
HOLD SPECIMEN: NORMAL
IMM GRANULOCYTES # BLD AUTO: 0.04 THOUSAND/UL (ref 0–0.2)
IMM GRANULOCYTES NFR BLD AUTO: 0 % (ref 0–2)
LIPASE SERPL-CCNC: 17 U/L (ref 73–393)
LYMPHOCYTES # BLD AUTO: 1.68 THOUSANDS/ΜL (ref 0.6–4.47)
LYMPHOCYTES NFR BLD AUTO: 16 % (ref 14–44)
MCH RBC QN AUTO: 27.9 PG (ref 26.8–34.3)
MCHC RBC AUTO-ENTMCNC: 31.5 G/DL (ref 31.4–37.4)
MCV RBC AUTO: 89 FL (ref 82–98)
MONOCYTES # BLD AUTO: 0.85 THOUSAND/ΜL (ref 0.17–1.22)
MONOCYTES NFR BLD AUTO: 8 % (ref 4–12)
NEUTROPHILS # BLD AUTO: 7.74 THOUSANDS/ΜL (ref 1.85–7.62)
NEUTS SEG NFR BLD AUTO: 74 % (ref 43–75)
NRBC BLD AUTO-RTO: 0 /100 WBCS
PLATELET # BLD AUTO: 235 THOUSANDS/UL (ref 149–390)
PMV BLD AUTO: 10.4 FL (ref 8.9–12.7)
POTASSIUM SERPL-SCNC: 4.4 MMOL/L (ref 3.5–5.3)
PROT SERPL-MCNC: 7 G/DL (ref 6.4–8.2)
RBC # BLD AUTO: 3.8 MILLION/UL (ref 3.88–5.62)
SODIUM SERPL-SCNC: 138 MMOL/L (ref 136–145)
WBC # BLD AUTO: 10.45 THOUSAND/UL (ref 4.31–10.16)

## 2021-12-07 PROCEDURE — 80053 COMPREHEN METABOLIC PANEL: CPT | Performed by: EMERGENCY MEDICINE

## 2021-12-07 PROCEDURE — 99285 EMERGENCY DEPT VISIT HI MDM: CPT | Performed by: EMERGENCY MEDICINE

## 2021-12-07 PROCEDURE — 85025 COMPLETE CBC W/AUTO DIFF WBC: CPT | Performed by: EMERGENCY MEDICINE

## 2021-12-07 PROCEDURE — 87505 NFCT AGENT DETECTION GI: CPT | Performed by: EMERGENCY MEDICINE

## 2021-12-07 PROCEDURE — 96366 THER/PROPH/DIAG IV INF ADDON: CPT

## 2021-12-07 PROCEDURE — 74176 CT ABD & PELVIS W/O CONTRAST: CPT

## 2021-12-07 PROCEDURE — 96365 THER/PROPH/DIAG IV INF INIT: CPT

## 2021-12-07 PROCEDURE — 36415 COLL VENOUS BLD VENIPUNCTURE: CPT

## 2021-12-07 PROCEDURE — 99284 EMERGENCY DEPT VISIT MOD MDM: CPT

## 2021-12-07 PROCEDURE — 83690 ASSAY OF LIPASE: CPT | Performed by: EMERGENCY MEDICINE

## 2021-12-07 PROCEDURE — 96375 TX/PRO/DX INJ NEW DRUG ADDON: CPT

## 2021-12-07 PROCEDURE — G1004 CDSM NDSC: HCPCS

## 2021-12-07 RX ORDER — DICYCLOMINE HCL 20 MG
20 TABLET ORAL 2 TIMES DAILY PRN
Qty: 10 TABLET | Refills: 0 | Status: SHIPPED | OUTPATIENT
Start: 2021-12-07 | End: 2022-02-21

## 2021-12-07 RX ORDER — AMOXICILLIN AND CLAVULANATE POTASSIUM 500; 125 MG/1; MG/1
1 TABLET, FILM COATED ORAL 2 TIMES DAILY
Qty: 10 TABLET | Refills: 0 | Status: SHIPPED | OUTPATIENT
Start: 2021-12-07 | End: 2021-12-13

## 2021-12-07 RX ORDER — SODIUM CHLORIDE, SODIUM GLUCONATE, SODIUM ACETATE, POTASSIUM CHLORIDE, MAGNESIUM CHLORIDE, SODIUM PHOSPHATE, DIBASIC, AND POTASSIUM PHOSPHATE .53; .5; .37; .037; .03; .012; .00082 G/100ML; G/100ML; G/100ML; G/100ML; G/100ML; G/100ML; G/100ML
1000 INJECTION, SOLUTION INTRAVENOUS ONCE
Status: COMPLETED | OUTPATIENT
Start: 2021-12-07 | End: 2021-12-07

## 2021-12-07 RX ORDER — DICYCLOMINE HCL 20 MG
20 TABLET ORAL ONCE
Status: COMPLETED | OUTPATIENT
Start: 2021-12-07 | End: 2021-12-07

## 2021-12-07 RX ORDER — AMOXICILLIN AND CLAVULANATE POTASSIUM 500; 125 MG/1; MG/1
1 TABLET, FILM COATED ORAL ONCE
Status: COMPLETED | OUTPATIENT
Start: 2021-12-07 | End: 2021-12-07

## 2021-12-07 RX ORDER — ONDANSETRON 2 MG/ML
4 INJECTION INTRAMUSCULAR; INTRAVENOUS ONCE
Status: COMPLETED | OUTPATIENT
Start: 2021-12-07 | End: 2021-12-07

## 2021-12-07 RX ADMIN — AMOXICILLIN AND CLAVULANATE POTASSIUM 1 TABLET: 500; 125 TABLET, FILM COATED ORAL at 19:44

## 2021-12-07 RX ADMIN — ONDANSETRON 4 MG: 2 INJECTION INTRAMUSCULAR; INTRAVENOUS at 18:29

## 2021-12-07 RX ADMIN — DICYCLOMINE HYDROCHLORIDE 20 MG: 20 TABLET ORAL at 18:28

## 2021-12-07 RX ADMIN — SODIUM CHLORIDE, SODIUM GLUCONATE, SODIUM ACETATE, POTASSIUM CHLORIDE, MAGNESIUM CHLORIDE, SODIUM PHOSPHATE, DIBASIC, AND POTASSIUM PHOSPHATE 1000 ML: .53; .5; .37; .037; .03; .012; .00082 INJECTION, SOLUTION INTRAVENOUS at 19:44

## 2021-12-08 LAB
C DIFF TOX GENS STL QL NAA+PROBE: NEGATIVE
CAMPYLOBACTER DNA SPEC NAA+PROBE: NORMAL
SALMONELLA DNA SPEC QL NAA+PROBE: NORMAL
SHIGA TOXIN STX GENE SPEC NAA+PROBE: NORMAL
SHIGELLA DNA SPEC QL NAA+PROBE: NORMAL

## 2021-12-09 ENCOUNTER — TELEPHONE (OUTPATIENT)
Dept: GASTROENTEROLOGY | Facility: CLINIC | Age: 76
End: 2021-12-09

## 2021-12-10 ENCOUNTER — TELEPHONE (OUTPATIENT)
Dept: GASTROENTEROLOGY | Facility: CLINIC | Age: 76
End: 2021-12-10

## 2021-12-11 DIAGNOSIS — I10 ESSENTIAL HYPERTENSION: ICD-10-CM

## 2021-12-11 DIAGNOSIS — K21.9 GASTROESOPHAGEAL REFLUX DISEASE, UNSPECIFIED WHETHER ESOPHAGITIS PRESENT: ICD-10-CM

## 2021-12-11 DIAGNOSIS — Z79.4 TYPE 2 DIABETES MELLITUS WITHOUT COMPLICATION, WITH LONG-TERM CURRENT USE OF INSULIN (HCC): ICD-10-CM

## 2021-12-11 DIAGNOSIS — E11.9 TYPE 2 DIABETES MELLITUS WITHOUT COMPLICATION, WITH LONG-TERM CURRENT USE OF INSULIN (HCC): ICD-10-CM

## 2021-12-13 ENCOUNTER — TELEPHONE (OUTPATIENT)
Dept: CARDIOLOGY CLINIC | Facility: CLINIC | Age: 76
End: 2021-12-13

## 2021-12-13 ENCOUNTER — TELEPHONE (OUTPATIENT)
Dept: GASTROENTEROLOGY | Facility: CLINIC | Age: 76
End: 2021-12-13

## 2021-12-13 ENCOUNTER — OFFICE VISIT (OUTPATIENT)
Dept: GASTROENTEROLOGY | Facility: CLINIC | Age: 76
End: 2021-12-13
Payer: MEDICARE

## 2021-12-13 VITALS
BODY MASS INDEX: 23.48 KG/M2 | HEART RATE: 75 BPM | DIASTOLIC BLOOD PRESSURE: 50 MMHG | SYSTOLIC BLOOD PRESSURE: 138 MMHG | HEIGHT: 70 IN | WEIGHT: 164 LBS

## 2021-12-13 DIAGNOSIS — K52.9 COLITIS: ICD-10-CM

## 2021-12-13 DIAGNOSIS — D63.1 ANEMIA DUE TO STAGE 4 CHRONIC KIDNEY DISEASE (HCC): Primary | ICD-10-CM

## 2021-12-13 DIAGNOSIS — K21.9 GASTROESOPHAGEAL REFLUX DISEASE, UNSPECIFIED WHETHER ESOPHAGITIS PRESENT: ICD-10-CM

## 2021-12-13 DIAGNOSIS — R11.0 NAUSEA: ICD-10-CM

## 2021-12-13 DIAGNOSIS — N18.4 ANEMIA DUE TO STAGE 4 CHRONIC KIDNEY DISEASE (HCC): Primary | ICD-10-CM

## 2021-12-13 PROCEDURE — 99214 OFFICE O/P EST MOD 30 MIN: CPT | Performed by: INTERNAL MEDICINE

## 2021-12-13 RX ORDER — PANTOPRAZOLE SODIUM 40 MG/1
TABLET, DELAYED RELEASE ORAL
Qty: 90 TABLET | Refills: 1 | Status: SHIPPED | OUTPATIENT
Start: 2021-12-13 | End: 2021-12-13 | Stop reason: SDUPTHER

## 2021-12-13 RX ORDER — AMLODIPINE BESYLATE 10 MG/1
TABLET ORAL
Qty: 90 TABLET | Refills: 3 | Status: SHIPPED | OUTPATIENT
Start: 2021-12-13

## 2021-12-13 RX ORDER — BENAZEPRIL HYDROCHLORIDE 40 MG/1
TABLET, FILM COATED ORAL
Qty: 90 TABLET | Refills: 3 | Status: SHIPPED | OUTPATIENT
Start: 2021-12-13 | End: 2022-03-25 | Stop reason: HOSPADM

## 2021-12-13 RX ORDER — ONDANSETRON 4 MG/1
4 TABLET, FILM COATED ORAL EVERY 8 HOURS PRN
Qty: 20 TABLET | Refills: 0 | Status: SHIPPED | OUTPATIENT
Start: 2021-12-13 | End: 2022-03-22 | Stop reason: CLARIF

## 2021-12-13 RX ORDER — PANTOPRAZOLE SODIUM 40 MG/1
40 TABLET, DELAYED RELEASE ORAL DAILY
Qty: 30 TABLET | Refills: 1 | Status: SHIPPED | OUTPATIENT
Start: 2021-12-13 | End: 2021-12-23 | Stop reason: SDUPTHER

## 2021-12-14 ENCOUNTER — TELEPHONE (OUTPATIENT)
Dept: GASTROENTEROLOGY | Facility: CLINIC | Age: 76
End: 2021-12-14

## 2021-12-14 ENCOUNTER — APPOINTMENT (OUTPATIENT)
Dept: LAB | Facility: MEDICAL CENTER | Age: 76
End: 2021-12-14
Payer: MEDICARE

## 2021-12-14 DIAGNOSIS — N18.4 ANEMIA DUE TO STAGE 4 CHRONIC KIDNEY DISEASE (HCC): ICD-10-CM

## 2021-12-14 DIAGNOSIS — D63.1 ANEMIA DUE TO STAGE 4 CHRONIC KIDNEY DISEASE (HCC): ICD-10-CM

## 2021-12-14 LAB
ALBUMIN SERPL BCP-MCNC: 3.5 G/DL (ref 3.5–5)
ALP SERPL-CCNC: 55 U/L (ref 46–116)
ALT SERPL W P-5'-P-CCNC: 17 U/L (ref 12–78)
ANION GAP SERPL CALCULATED.3IONS-SCNC: 7 MMOL/L (ref 4–13)
AST SERPL W P-5'-P-CCNC: 12 U/L (ref 5–45)
BACTERIA UR QL AUTO: ABNORMAL /HPF
BASOPHILS # BLD AUTO: 0.02 THOUSANDS/ΜL (ref 0–0.1)
BASOPHILS NFR BLD AUTO: 0 % (ref 0–1)
BILIRUB SERPL-MCNC: 0.47 MG/DL (ref 0.2–1)
BILIRUB UR QL STRIP: ABNORMAL
BUN SERPL-MCNC: 39 MG/DL (ref 5–25)
CALCIUM SERPL-MCNC: 8.9 MG/DL (ref 8.3–10.1)
CHLORIDE SERPL-SCNC: 105 MMOL/L (ref 100–108)
CLARITY UR: CLEAR
CO2 SERPL-SCNC: 23 MMOL/L (ref 21–32)
COLOR UR: YELLOW
CREAT SERPL-MCNC: 2.49 MG/DL (ref 0.6–1.3)
CREAT UR-MCNC: 262 MG/DL
EOSINOPHIL # BLD AUTO: 0.26 THOUSAND/ΜL (ref 0–0.61)
EOSINOPHIL NFR BLD AUTO: 3 % (ref 0–6)
ERYTHROCYTE [DISTWIDTH] IN BLOOD BY AUTOMATED COUNT: 14.4 % (ref 11.6–15.1)
GFR SERPL CREATININE-BSD FRML MDRD: 23 ML/MIN/1.73SQ M
GLUCOSE P FAST SERPL-MCNC: 102 MG/DL (ref 65–99)
GLUCOSE UR STRIP-MCNC: NEGATIVE MG/DL
HCT VFR BLD AUTO: 32 % (ref 36.5–49.3)
HGB BLD-MCNC: 10.1 G/DL (ref 12–17)
HGB UR QL STRIP.AUTO: NEGATIVE
HYALINE CASTS #/AREA URNS LPF: ABNORMAL /LPF
IMM GRANULOCYTES # BLD AUTO: 0.03 THOUSAND/UL (ref 0–0.2)
IMM GRANULOCYTES NFR BLD AUTO: 0 % (ref 0–2)
KETONES UR STRIP-MCNC: NEGATIVE MG/DL
LEUKOCYTE ESTERASE UR QL STRIP: NEGATIVE
LYMPHOCYTES # BLD AUTO: 1.71 THOUSANDS/ΜL (ref 0.6–4.47)
LYMPHOCYTES NFR BLD AUTO: 21 % (ref 14–44)
MCH RBC QN AUTO: 28.8 PG (ref 26.8–34.3)
MCHC RBC AUTO-ENTMCNC: 31.6 G/DL (ref 31.4–37.4)
MCV RBC AUTO: 91 FL (ref 82–98)
MICROALBUMIN UR-MCNC: 511 MG/L (ref 0–20)
MICROALBUMIN/CREAT 24H UR: 195 MG/G CREATININE (ref 0–30)
MONOCYTES # BLD AUTO: 0.66 THOUSAND/ΜL (ref 0.17–1.22)
MONOCYTES NFR BLD AUTO: 8 % (ref 4–12)
NEUTROPHILS # BLD AUTO: 5.51 THOUSANDS/ΜL (ref 1.85–7.62)
NEUTS SEG NFR BLD AUTO: 68 % (ref 43–75)
NITRITE UR QL STRIP: NEGATIVE
NON-SQ EPI CELLS URNS QL MICRO: ABNORMAL /HPF
NRBC BLD AUTO-RTO: 0 /100 WBCS
PH UR STRIP.AUTO: 5.5 [PH]
PLATELET # BLD AUTO: 284 THOUSANDS/UL (ref 149–390)
PMV BLD AUTO: 10.6 FL (ref 8.9–12.7)
POTASSIUM SERPL-SCNC: 4.3 MMOL/L (ref 3.5–5.3)
PROT SERPL-MCNC: 7.1 G/DL (ref 6.4–8.2)
PROT UR STRIP-MCNC: ABNORMAL MG/DL
RBC # BLD AUTO: 3.51 MILLION/UL (ref 3.88–5.62)
RBC #/AREA URNS AUTO: ABNORMAL /HPF
SODIUM SERPL-SCNC: 135 MMOL/L (ref 136–145)
SP GR UR STRIP.AUTO: 1.02 (ref 1–1.03)
UROBILINOGEN UR QL STRIP.AUTO: 0.2 E.U./DL
WBC # BLD AUTO: 8.19 THOUSAND/UL (ref 4.31–10.16)
WBC #/AREA URNS AUTO: ABNORMAL /HPF

## 2021-12-14 PROCEDURE — 82043 UR ALBUMIN QUANTITATIVE: CPT | Performed by: NURSE PRACTITIONER

## 2021-12-14 PROCEDURE — 82570 ASSAY OF URINE CREATININE: CPT | Performed by: NURSE PRACTITIONER

## 2021-12-14 PROCEDURE — 80053 COMPREHEN METABOLIC PANEL: CPT

## 2021-12-14 PROCEDURE — 85025 COMPLETE CBC W/AUTO DIFF WBC: CPT

## 2021-12-14 PROCEDURE — 81001 URINALYSIS AUTO W/SCOPE: CPT | Performed by: NURSE PRACTITIONER

## 2021-12-15 ENCOUNTER — TELEPHONE (OUTPATIENT)
Dept: GASTROENTEROLOGY | Facility: HOSPITAL | Age: 76
End: 2021-12-15

## 2021-12-15 DIAGNOSIS — D63.1 ANEMIA DUE TO STAGE 4 CHRONIC KIDNEY DISEASE (HCC): Primary | ICD-10-CM

## 2021-12-15 DIAGNOSIS — N18.4 ANEMIA DUE TO STAGE 4 CHRONIC KIDNEY DISEASE (HCC): Primary | ICD-10-CM

## 2021-12-17 ENCOUNTER — TELEPHONE (OUTPATIENT)
Dept: GASTROENTEROLOGY | Facility: HOSPITAL | Age: 76
End: 2021-12-17

## 2021-12-20 ENCOUNTER — TELEPHONE (OUTPATIENT)
Dept: GASTROENTEROLOGY | Facility: HOSPITAL | Age: 76
End: 2021-12-20

## 2021-12-21 ENCOUNTER — ANESTHESIA (OUTPATIENT)
Dept: GASTROENTEROLOGY | Facility: HOSPITAL | Age: 76
End: 2021-12-21

## 2021-12-21 ENCOUNTER — HOSPITAL ENCOUNTER (OUTPATIENT)
Dept: GASTROENTEROLOGY | Facility: HOSPITAL | Age: 76
Setting detail: OUTPATIENT SURGERY
Discharge: HOME | End: 2021-12-21
Attending: INTERNAL MEDICINE | Admitting: INTERNAL MEDICINE
Payer: MEDICARE

## 2021-12-21 ENCOUNTER — ANESTHESIA EVENT (OUTPATIENT)
Dept: GASTROENTEROLOGY | Facility: HOSPITAL | Age: 76
End: 2021-12-21

## 2021-12-21 VITALS
HEIGHT: 70 IN | RESPIRATION RATE: 16 BRPM | WEIGHT: 165.6 LBS | SYSTOLIC BLOOD PRESSURE: 130 MMHG | BODY MASS INDEX: 23.71 KG/M2 | TEMPERATURE: 97.8 F | HEART RATE: 56 BPM | DIASTOLIC BLOOD PRESSURE: 58 MMHG | OXYGEN SATURATION: 99 %

## 2021-12-21 DIAGNOSIS — K52.9 COLITIS: ICD-10-CM

## 2021-12-21 LAB — GLUCOSE SERPL-MCNC: 128 MG/DL (ref 65–140)

## 2021-12-21 PROCEDURE — 88342 IMHCHEM/IMCYTCHM 1ST ANTB: CPT | Performed by: PATHOLOGY

## 2021-12-21 PROCEDURE — 88305 TISSUE EXAM BY PATHOLOGIST: CPT | Performed by: PATHOLOGY

## 2021-12-21 PROCEDURE — 45380 COLONOSCOPY AND BIOPSY: CPT | Performed by: INTERNAL MEDICINE

## 2021-12-21 PROCEDURE — 82948 REAGENT STRIP/BLOOD GLUCOSE: CPT

## 2021-12-21 PROCEDURE — 88341 IMHCHEM/IMCYTCHM EA ADD ANTB: CPT | Performed by: PATHOLOGY

## 2021-12-21 RX ORDER — SODIUM CHLORIDE, SODIUM LACTATE, POTASSIUM CHLORIDE, CALCIUM CHLORIDE 600; 310; 30; 20 MG/100ML; MG/100ML; MG/100ML; MG/100ML
INJECTION, SOLUTION INTRAVENOUS CONTINUOUS PRN
Status: DISCONTINUED | OUTPATIENT
Start: 2021-12-21 | End: 2021-12-21

## 2021-12-21 RX ORDER — PROPOFOL 10 MG/ML
INJECTION, EMULSION INTRAVENOUS AS NEEDED
Status: DISCONTINUED | OUTPATIENT
Start: 2021-12-21 | End: 2021-12-21

## 2021-12-21 RX ORDER — LIDOCAINE HYDROCHLORIDE 10 MG/ML
INJECTION, SOLUTION EPIDURAL; INFILTRATION; INTRACAUDAL; PERINEURAL AS NEEDED
Status: DISCONTINUED | OUTPATIENT
Start: 2021-12-21 | End: 2021-12-21

## 2021-12-21 RX ORDER — ONDANSETRON 2 MG/ML
4 INJECTION INTRAMUSCULAR; INTRAVENOUS ONCE
Status: DISCONTINUED | OUTPATIENT
Start: 2021-12-21 | End: 2021-12-21

## 2021-12-21 RX ADMIN — SODIUM CHLORIDE, SODIUM LACTATE, POTASSIUM CHLORIDE, AND CALCIUM CHLORIDE: .6; .31; .03; .02 INJECTION, SOLUTION INTRAVENOUS at 15:06

## 2021-12-21 RX ADMIN — PROPOFOL 50 MG: 10 INJECTION, EMULSION INTRAVENOUS at 15:15

## 2021-12-21 RX ADMIN — LIDOCAINE HYDROCHLORIDE 50 MG: 10 INJECTION, SOLUTION EPIDURAL; INFILTRATION; INTRACAUDAL; PERINEURAL at 15:12

## 2021-12-21 RX ADMIN — PROPOFOL 40 MG: 10 INJECTION, EMULSION INTRAVENOUS at 15:18

## 2021-12-21 RX ADMIN — PROPOFOL 30 MG: 10 INJECTION, EMULSION INTRAVENOUS at 15:22

## 2021-12-21 RX ADMIN — PROPOFOL 80 MG: 10 INJECTION, EMULSION INTRAVENOUS at 15:12

## 2021-12-23 DIAGNOSIS — K21.9 GASTROESOPHAGEAL REFLUX DISEASE, UNSPECIFIED WHETHER ESOPHAGITIS PRESENT: ICD-10-CM

## 2021-12-27 RX ORDER — PANTOPRAZOLE SODIUM 40 MG/1
40 TABLET, DELAYED RELEASE ORAL DAILY
Qty: 90 TABLET | Refills: 1 | Status: SHIPPED | OUTPATIENT
Start: 2021-12-27 | End: 2022-06-11

## 2021-12-29 ENCOUNTER — TELEMEDICINE (OUTPATIENT)
Dept: FAMILY MEDICINE CLINIC | Facility: CLINIC | Age: 76
End: 2021-12-29
Payer: MEDICARE

## 2021-12-29 DIAGNOSIS — N18.4 ANEMIA DUE TO STAGE 4 CHRONIC KIDNEY DISEASE (HCC): ICD-10-CM

## 2021-12-29 DIAGNOSIS — N18.4 CHRONIC KIDNEY DISEASE (CKD) STAGE G4/A2, SEVERELY DECREASED GLOMERULAR FILTRATION RATE (GFR) BETWEEN 15-29 ML/MIN/1.73 SQUARE METER AND ALBUMINURIA CREATININE RATIO BETWEEN 30-299 MG/G (HCC): Primary | ICD-10-CM

## 2021-12-29 DIAGNOSIS — D63.1 ANEMIA DUE TO STAGE 4 CHRONIC KIDNEY DISEASE (HCC): ICD-10-CM

## 2021-12-29 PROCEDURE — 1124F ACP DISCUSS-NO DSCNMKR DOCD: CPT | Performed by: NURSE PRACTITIONER

## 2021-12-29 PROCEDURE — 99441 PR PHYS/QHP TELEPHONE EVALUATION 5-10 MIN: CPT | Performed by: NURSE PRACTITIONER

## 2021-12-30 ENCOUNTER — APPOINTMENT (OUTPATIENT)
Dept: LAB | Facility: MEDICAL CENTER | Age: 76
End: 2021-12-30
Payer: MEDICARE

## 2021-12-30 DIAGNOSIS — N18.4 ANEMIA DUE TO STAGE 4 CHRONIC KIDNEY DISEASE (HCC): ICD-10-CM

## 2021-12-30 DIAGNOSIS — N18.4 CHRONIC KIDNEY DISEASE (CKD) STAGE G4/A2, SEVERELY DECREASED GLOMERULAR FILTRATION RATE (GFR) BETWEEN 15-29 ML/MIN/1.73 SQUARE METER AND ALBUMINURIA CREATININE RATIO BETWEEN 30-299 MG/G (HCC): ICD-10-CM

## 2021-12-30 DIAGNOSIS — D63.1 ANEMIA DUE TO STAGE 4 CHRONIC KIDNEY DISEASE (HCC): ICD-10-CM

## 2021-12-30 LAB
ANION GAP SERPL CALCULATED.3IONS-SCNC: 5 MMOL/L (ref 4–13)
BASOPHILS # BLD AUTO: 0.02 THOUSANDS/ΜL (ref 0–0.1)
BASOPHILS NFR BLD AUTO: 1 % (ref 0–1)
BUN SERPL-MCNC: 31 MG/DL (ref 5–25)
CALCIUM SERPL-MCNC: 8.8 MG/DL (ref 8.3–10.1)
CHLORIDE SERPL-SCNC: 109 MMOL/L (ref 100–108)
CO2 SERPL-SCNC: 25 MMOL/L (ref 21–32)
CREAT SERPL-MCNC: 1.83 MG/DL (ref 0.6–1.3)
EOSINOPHIL # BLD AUTO: 0.18 THOUSAND/ΜL (ref 0–0.61)
EOSINOPHIL NFR BLD AUTO: 4 % (ref 0–6)
ERYTHROCYTE [DISTWIDTH] IN BLOOD BY AUTOMATED COUNT: 14.3 % (ref 11.6–15.1)
GFR SERPL CREATININE-BSD FRML MDRD: 34 ML/MIN/1.73SQ M
GLUCOSE SERPL-MCNC: 136 MG/DL (ref 65–140)
HCT VFR BLD AUTO: 28.3 % (ref 36.5–49.3)
HGB BLD-MCNC: 9.1 G/DL (ref 12–17)
IMM GRANULOCYTES # BLD AUTO: 0.01 THOUSAND/UL (ref 0–0.2)
IMM GRANULOCYTES NFR BLD AUTO: 0 % (ref 0–2)
LYMPHOCYTES # BLD AUTO: 1.16 THOUSANDS/ΜL (ref 0.6–4.47)
LYMPHOCYTES NFR BLD AUTO: 28 % (ref 14–44)
MCH RBC QN AUTO: 28.6 PG (ref 26.8–34.3)
MCHC RBC AUTO-ENTMCNC: 32.2 G/DL (ref 31.4–37.4)
MCV RBC AUTO: 89 FL (ref 82–98)
MONOCYTES # BLD AUTO: 0.32 THOUSAND/ΜL (ref 0.17–1.22)
MONOCYTES NFR BLD AUTO: 8 % (ref 4–12)
NEUTROPHILS # BLD AUTO: 2.44 THOUSANDS/ΜL (ref 1.85–7.62)
NEUTS SEG NFR BLD AUTO: 59 % (ref 43–75)
NRBC BLD AUTO-RTO: 0 /100 WBCS
PLATELET # BLD AUTO: 187 THOUSANDS/UL (ref 149–390)
PMV BLD AUTO: 10.9 FL (ref 8.9–12.7)
POTASSIUM SERPL-SCNC: 4.2 MMOL/L (ref 3.5–5.3)
RBC # BLD AUTO: 3.18 MILLION/UL (ref 3.88–5.62)
SODIUM SERPL-SCNC: 139 MMOL/L (ref 136–145)
WBC # BLD AUTO: 4.13 THOUSAND/UL (ref 4.31–10.16)

## 2021-12-30 PROCEDURE — 80048 BASIC METABOLIC PNL TOTAL CA: CPT

## 2021-12-30 PROCEDURE — 36415 COLL VENOUS BLD VENIPUNCTURE: CPT

## 2021-12-30 PROCEDURE — 85025 COMPLETE CBC W/AUTO DIFF WBC: CPT

## 2022-01-10 ENCOUNTER — CONSULT (OUTPATIENT)
Dept: NEPHROLOGY | Facility: CLINIC | Age: 77
End: 2022-01-10
Payer: MEDICARE

## 2022-01-10 VITALS
BODY MASS INDEX: 24.6 KG/M2 | HEART RATE: 61 BPM | WEIGHT: 171.8 LBS | DIASTOLIC BLOOD PRESSURE: 48 MMHG | HEIGHT: 70 IN | SYSTOLIC BLOOD PRESSURE: 152 MMHG

## 2022-01-10 DIAGNOSIS — N18.30 CONTROLLED TYPE 2 DIABETES MELLITUS WITH STAGE 3 CHRONIC KIDNEY DISEASE, WITHOUT LONG-TERM CURRENT USE OF INSULIN (HCC): ICD-10-CM

## 2022-01-10 DIAGNOSIS — I12.9 BENIGN HYPERTENSION WITH CHRONIC KIDNEY DISEASE, STAGE III (HCC): ICD-10-CM

## 2022-01-10 DIAGNOSIS — E83.42 HYPOMAGNESEMIA: ICD-10-CM

## 2022-01-10 DIAGNOSIS — D72.819 LEUKOPENIA, UNSPECIFIED TYPE: ICD-10-CM

## 2022-01-10 DIAGNOSIS — N18.4 ANEMIA DUE TO STAGE 4 CHRONIC KIDNEY DISEASE (HCC): ICD-10-CM

## 2022-01-10 DIAGNOSIS — R80.1 PERSISTENT PROTEINURIA, UNSPECIFIED: ICD-10-CM

## 2022-01-10 DIAGNOSIS — N28.1 RENAL CYST, LEFT: ICD-10-CM

## 2022-01-10 DIAGNOSIS — N18.30 BENIGN HYPERTENSION WITH CHRONIC KIDNEY DISEASE, STAGE III (HCC): ICD-10-CM

## 2022-01-10 DIAGNOSIS — E11.22 CONTROLLED TYPE 2 DIABETES MELLITUS WITH STAGE 3 CHRONIC KIDNEY DISEASE, WITHOUT LONG-TERM CURRENT USE OF INSULIN (HCC): ICD-10-CM

## 2022-01-10 DIAGNOSIS — N18.32 STAGE 3B CHRONIC KIDNEY DISEASE (HCC): Primary | ICD-10-CM

## 2022-01-10 DIAGNOSIS — D63.1 ANEMIA DUE TO STAGE 4 CHRONIC KIDNEY DISEASE (HCC): ICD-10-CM

## 2022-01-10 LAB
SL AMB  POCT GLUCOSE, UA: ABNORMAL
SL AMB LEUKOCYTE ESTERASE,UA: ABNORMAL
SL AMB POCT BILIRUBIN,UA: ABNORMAL
SL AMB POCT BLOOD,UA: ABNORMAL
SL AMB POCT CLARITY,UA: CLEAR
SL AMB POCT COLOR,UA: YELLOW
SL AMB POCT KETONES,UA: ABNORMAL
SL AMB POCT NITRITE,UA: ABNORMAL
SL AMB POCT PH,UA: 5
SL AMB POCT SPECIFIC GRAVITY,UA: 1.02
SL AMB POCT URINE PROTEIN: ABNORMAL
SL AMB POCT UROBILINOGEN: ABNORMAL

## 2022-01-10 PROCEDURE — 81002 URINALYSIS NONAUTO W/O SCOPE: CPT | Performed by: INTERNAL MEDICINE

## 2022-01-10 PROCEDURE — 99204 OFFICE O/P NEW MOD 45 MIN: CPT | Performed by: INTERNAL MEDICINE

## 2022-01-10 RX ORDER — HYDRALAZINE HYDROCHLORIDE 50 MG/1
50 TABLET, FILM COATED ORAL 3 TIMES DAILY
Qty: 270 TABLET | Refills: 3 | Status: SHIPPED | OUTPATIENT
Start: 2022-01-10 | End: 2022-03-25 | Stop reason: HOSPADM

## 2022-01-10 NOTE — PROGRESS NOTES
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 age-related nephron loss plus episode of acute kidney injury  -Plan:    Continue to monitor renal function  Check BMP in 2 weeks and again follow-up in 4 months with repeat blood work before the office visit   Avoid Nephrotoxins like NSAIDs and IV contrast     CKD Education: not interested  He mentions has been watching YouTube videos    Primary Hypertension with chronic kidney disease stage 3:   -Current medication: Benazepril 40 mg daily, Amlodipine 10 mg daily, Hydralazine 50 mg bid  -Current blood pressure: BP elevated  -Plan:    ·  Increased Hydralazine to 50 mg tid  New prescription provided  -Recommend 2 g sodium diet  -Recommend daily exercise and weight loss  -Discussed home monitoring of BP and maintaining a log of blood pressure   -Recommend goal BP less than 130/80  Proteinuria, persistent  -195 microalbumin/cr ratio in dec 2021  -office UA  trace protein, microscopic exam did not show any RBC or WBC trace protein  -likely due to hypertensive nephrosclerosis  -continue benazepril    Hypomagnesemia  On mag oxide 200 mg daily  Had very low magnesium level back in 2015  -last Mag level from sept 2020 was 1 7   -likely due to chronic diarrhea and use of PPI  -will recheck    Continue current dose     Anemia due to CKD stage 3b/ Leukopenia  -hb 9 1, iron stores was not checked previously, MCV normal  -takes once a weekly ferrous sulfate once a week  -will repeat CBC in 2 weeks with the next blood work and also check iron panel  -refer to hematology oncology specially in the setting of leukopenia    Left nephrolithiasis/left renal cyst  -currently asymptomatic and patient not aware  -reviewed CT abdomen report from December 2021:  Nonobstructing intrarenal calculi the largest in the left lower pole measures 9 mm  No hydronephrosis  Simple parapelvic cyst on the left  -stressed on oral hydration for nephrolithiasis, no monitoring needed for renal cyst as it was a simple parapelvic cyst   Discussed with patient and verbalized understanding      Hyperlipidemia  -on Lipitor, continue monitoring lipid panel per PCP, recommend goal LDL less than 100  -last lipid panel from June 2021 showed LDL at goal at 39    DM-2 controlled, with chronic kidney disease stage 3  - metformin , okay to continue for now as long as EGFR more than 30  May need to stop once EGFR drops to less than 30  -last A1c 6 0  -continue management per PCP    Smoking/tobacco use, stressed on quitting smoking but patient not interested at this time    Colitis  -recent colonoscopy Colonoscopy: Superficial ulceration in the right colon around ileo colonic anastomotic site  Noted plan for repeat in 3 yrs   Biopsy negative    -continue to follow-up with GI    Diagnoses and all orders for this visit:    Stage 3b chronic kidney disease ([address] )  -     Basic metabolic panel; Future  -     CBC; Future  -     Iron Panel (Includes Ferritin, Iron Sat%, Iron, and TIBC); Future  -     Magnesium; Future  -     Basic metabolic panel; Future  -     Protein / creatinine ratio, urine; Future  -     Phosphorus; Future  -     PTH, intact; Future  -     Urinalysis with microscopic; Future  -     Magnesium; Future  -     CBC; Future    Benign hypertension with chronic kidney disease, stage III (HCC)  -     Basic metabolic panel; Future  -     hydrALAZINE (APRESOLINE) 50 mg tablet; Take 1 tablet (50 mg total) by mouth 3 (three) times a day  -     Basic metabolic panel;  Future    Persistent proteinuria, unspecified  -     Protein / creatinine ratio, urine; Future    Anemia due to stage 4 chronic kidney disease ([facility] )  -     Ambulatory referral to Nephrology  -     POCT urine dip  -     CBC; Future  -     Iron Panel (Includes Ferritin, Iron Sat%, Iron, and TIBC); Future  -     Ambulatory referral to Hematology / Oncology; Future  -     CBC; Future    Renal cyst, left    Leukopenia, unspecified type  -     Ambulatory referral to Hematology / Oncology; Future  -     CBC; Future    Hypomagnesemia  -     Magnesium; Future    Controlled type 2 diabetes mellitus with stage 3 chronic kidney disease, without long-term current use of insulin (HCC)  -     Basic metabolic panel; Future         HISTORY OF PRESENT ILLNESS:  Jose Alfredo Hartman  is a 64y o  year old male with medical issues of chronic kidney disease, HTN x 15 yrs,  DM-2 anemia, colitis  , CAD s/p CABG who presents for initial consultation for chronic kidney disease  Review of records, patient has elevated creatinine dating back to 2018 November when the creatinine was 1 3  It was stable at 1 4 to 1 7 in 2020  Since June 2021 creatinine has been around 1 6-1 8  It had worsened to creatinine 2 49 on 12/14, likely prerenal in the setting of abdominal pain and diarrhea  Last blood work from 12/30, reviewed creatinine improving to creatinine 1 8 with stable electrolytes  Hemoglobin was low at 9 1  Currently diarrhea has improved, denies any intake of NSAIDs, denies any urinary symptoms or lower extremity edema  Patient concerned about anemia and WBC count trending down, requesting referral to Hematology Oncology  No shortness of breath    REVIEW OF SYSTEMS:    Review of Systems   Constitutional: Negative for activity change, appetite change, chills, diaphoresis, fatigue and fever  HENT: Negative for congestion, facial swelling and nosebleeds  Eyes: Negative for pain and visual disturbance  Respiratory: Negative for cough, chest tightness and shortness of breath      Cardiovascular: Negative for chest pain and palpitations  Gastrointestinal: Negative for abdominal distention, abdominal pain, diarrhea, nausea and vomiting  Genitourinary: Negative for difficulty urinating, dysuria, flank pain, frequency and hematuria  Musculoskeletal: Negative for arthralgias, back pain and joint swelling  Skin: Negative for rash  Neurological: Negative for dizziness, seizures, syncope, weakness and headaches  Psychiatric/Behavioral: Negative for agitation and confusion  The patient is not nervous/anxious  More than 10 point review of systems were obtained and discussed in length with the patient       PAST MEDICAL HISTORY:  Past Medical History:   Diagnosis Date    Arteriosclerosis of arteries of extremities (HCC)     CAD (coronary artery disease)     Chronic kidney disease     Diabetes ([facility] [address] )     Dyslipidemia     Endocrine pancreas disorder     GERD (gastroesophageal reflux disease)     Hyperlipidemia     Hypertension     Kidney stones     [facility]peripheral [facility]        PAST SURGICAL HISTORY:  Past Surgical History:   Procedure Laterality Date    APPENDECTOMY      COLECTOMY      COLONOSCOPY  2013    CORONARY ARTERY BYPASS GRAFT  2013    X 2    FEMORAL ARTERY - POPLITEAL ARTERY BYPASS GRAFT      HEMORRHOID SURGERY      IR AORTAGRAM WITH RUN-OFF  11/19/2018    RI SLCTV CATHJ 3RD+ ORD SLCTV ABDL PEL/LXTR [facility] Left 8/12/2016    Procedure: LEFT FEMORAL ARTERIOGRAM; BALLOON ANGIOPLASTY; SFA  AND FEMORAL AT VEIN GRAFT;  Surgeon: Elizabeth Glasgow MD;  Location: [facility];  Service: Vascular    TONSILLECTOMY AND ADENOIDECTOMY         ALLERGIES:  No Known Allergies    SOCIAL HISTORY:  Social History     Substance and Sexual Activity   Alcohol Use Yes    Comment: occasional     Social History     Substance and Sexual Activity   Drug Use No     Social History     Tobacco Use   Smoking Status Current Every Day Smoker    Packs/day: 1 00    Years: 50 00    Pack years: 50 00    Types: Cigarettes   Smokeless Tobacco Never Used       FAMILY HISTORY:  Family History   Problem Relation Age of Onset    Heart attack Father     Other Sister         bypass and vlave replacement    Stroke Paternal Uncle     Arrhythmia Neg Hx     Asthma Neg Hx     Clotting disorder Neg Hx     Fainting Neg Hx     Anuerysm Neg Hx     Hypertension Neg Hx         unsure     Hyperlipidemia Neg Hx     Heart failure Neg Hx        MEDICATIONS:    Current Outpatient Medications:     amLODIPine (NORVASC) 10 mg tablet, TAKE 1 TABLET DAILY, Disp: 90 tablet, Rfl: 3    AMYLASE-LIPASE-PROTEASE PO, Take by mouth , Disp: , Rfl:     aspirin (ECOTRIN LOW STRENGTH) 81 mg EC tablet, Take 81 mg by mouth daily, Disp: , Rfl:     atorvastatin (LIPITOR) 80 mg tablet, TAKE 1 TABLET DAILY AT     BEDTIME, Disp: 90 tablet, Rfl: 3    benazepril (LOTENSIN) 40 MG tablet, TAKE 1 TABLET DAILY, Disp: 90 tablet, Rfl: 3    clopidogrel (PLAVIX) 75 mg tablet, TAKE 1 TABLET DAILY, Disp: 90 tablet, Rfl: 3    Cyanocobalamin (VITAMIN B12 PO), Take by mouth once a week , Disp: , Rfl:     Fluad Quadrivalent 0 5 ML PRSY, PHARMACY ADMINISTERED, Disp: , Rfl:     hydrALAZINE (APRESOLINE) 50 mg tablet, Take 1 tablet (50 mg total) by mouth 3 (three) times a day, Disp: 270 tablet, Rfl: 3    Magnesium Oxide (MAG-200 PO), Take by mouth once a week , Disp: , Rfl:     metFORMIN (GLUCOPHAGE) 500 mg tablet, TAKE 2 TABLETS TWICE A DAY, Disp: 360 tablet, Rfl: 1    Multiple Vitamin (MULTIVITAMIN) tablet, Take 1 tablet by mouth daily  , Disp: , Rfl:     ondansetron (ZOFRAN) 4 mg tablet, Take 1 tablet (4 mg total) by mouth every 8 (eight) hours as needed for nausea or vomiting, Disp: 20 tablet, Rfl: 0    pantoprazole (PROTONIX) 40 mg tablet, Take 1 tablet (40 mg total) by mouth daily, Disp: 90 tablet, Rfl: 1    dicyclomine (BENTYL) 20 mg tablet, Take 1 tablet (20 mg total) by mouth 2 (two) times a day as needed (diarrhea, abdominal cramping) (Patient not taking: Reported on 12/13/2021 ), Disp: 10 tablet, Rfl: 0      PHYSICAL EXAM:  Vitals:    01/10/22 1055 01/10/22 1120   BP:  (!) 152/48   Pulse:  61   Weight: 77 9 kg (171 lb 12 8 oz)    Height: 5' 10" (1 778 m)      Body mass index is 24 65 kg/m²  Wt Readings from Last 3 Encounters:   01/10/22 77 9 kg (171 lb 12 8 oz)   12/21/21 75 1 kg (165 lb 9 6 oz)   12/13/21 74 4 kg (164 lb)     Physical Exam  Constitutional:       General: He is not in acute distress  Appearance: He is well-developed  He is not diaphoretic  HENT:      Head: Normocephalic and atraumatic  Mouth/Throat:      Mouth: Mucous membranes are moist    Eyes:      General: No scleral icterus  Conjunctiva/sclera: Conjunctivae normal       Pupils: Pupils are equal, round, and reactive to light  Neck:      Thyroid: No thyromegaly  Cardiovascular:      Rate and Rhythm: Normal rate and regular rhythm  Heart sounds: Normal heart sounds  No murmur heard  No friction rub  Pulmonary:      Effort: Pulmonary effort is normal  No respiratory distress  Breath sounds: Normal breath sounds  No wheezing or rales  Abdominal:      General: Bowel sounds are normal  There is no distension  Palpations: Abdomen is soft  Tenderness: There is no abdominal tenderness  Musculoskeletal:         General: No deformity  Cervical back: Neck supple  Right lower leg: No edema  Left lower leg: No edema  Lymphadenopathy:      Cervical: No cervical adenopathy  Skin:     Coloration: Skin is not pale  Nails: There is no clubbing  Neurological:      Mental Status: He is alert and oriented to person, place, and time  He is not disoriented  Psychiatric:         Mood and Affect: Mood normal  Mood is not anxious  Affect is not inappropriate  Behavior: Behavior normal          Thought Content:  Thought content normal            Lab Results:   Results for orders placed or performed in visit on 01/10/22   POCT urine dip   Result Value Ref Range    LEUKOCYTE ESTERASE,UA neg     NITRITE,UA neg     SL AMB POCT UROBILINOGEN neg     POCT URINE PROTEIN pos (30)      PH,UA 5     BLOOD,UA neg     SPECIFIC GRAVITY,UA 1 020     KETONES,UA neg     BILIRUBIN,UA neg     GLUCOSE, UA neg      COLOR,UA yellow     CLARITY,UA clear              Invalid input(s): ALBUMIN    Portions of the record may have been created with voice recognition software  Occasional wrong word or "sound a like" substitutions may have occurred due to the inherent limitations of voice recognition software  Read the chart carefully and recognize, using context, where substitutions have occurred

## 2022-01-10 NOTE — PATIENT INSTRUCTIONS
Blood pressure is elevated, increase hydralazine to 50 mg 3 times a day   Continue other antihypertensive medication  -Recommend 2 g sodium diet  -Recommend daily exercise and weight loss  -Discussed home monitoring of BP and maintaining a log of blood pressure   -Recommend goal BP less than 130/80  Please inform me if SBP below 110 or above 140's persistently  -Renal Function is stable  -You have Chronic Kidney Disease Stage 3  Check blood work in 2 weeks, follow-up in 4 months with repeat blood work and urine test  -Avoid NSAIDs like Ibuprofen/Motrin, Naproxen/Aleve, Celebrex, meloxicam/Mobic, Diclofenac and other NSAIDs   -Okay to take Acetaminophen/Tylenol if you do not have any liver problems  -Avoid IV contrast used for CT scan and cardiac catheterization     -If plan for any study with IV contrast, please let me know so we could hydrate with fluids before and after IV contrast  -Dosage  of certain medications may need to be adjusted depending on Kidney function      Refer to hematology oncology    Blood work in 2 weeks  Follow-up in 4 months with repeat blood work before the office visit

## 2022-01-11 ENCOUNTER — TELEPHONE (OUTPATIENT)
Dept: HEMATOLOGY ONCOLOGY | Facility: CLINIC | Age: 77
End: 2022-01-11

## 2022-01-11 NOTE — TELEPHONE ENCOUNTER
New Patient Intake Form   Patient Details:    Chuyita Clark  1945  [de-identified]    Appointment Information   Who is calling to schedule? Patient    If not self, what is the caller's name? Please put name of RBC nurse as well  Referring provider Dr Jennyfer Quintana    What is the diagnosis? Anemia   Is there a confirmed tissue diagnosis? no   Is patient aware of diagnosis? yes   Have you had any imaging or labs done? If yes, where? (If imaging done outside of [facility], please remind patient to bring a disk ) yes   Have you been seen by another Oncologist/Hematologist?  If so, who and where? no   Are the records in [facility] or Care Everywhere? yes   Are records needed from an outside facility? no   If yes, Name of facility, city and state where facility is located  N/a   Preferred [facility]   Is the patient willing to be seen by another provider?   (This is for breast patients only)    Miscellaneous Information:

## 2022-01-21 ENCOUNTER — APPOINTMENT (OUTPATIENT)
Dept: LAB | Facility: MEDICAL CENTER | Age: 77
End: 2022-01-21
Payer: MEDICARE

## 2022-01-21 ENCOUNTER — TELEPHONE (OUTPATIENT)
Dept: OTHER | Facility: HOSPITAL | Age: 77
End: 2022-01-21

## 2022-01-21 DIAGNOSIS — D63.1 ANEMIA DUE TO STAGE 4 CHRONIC KIDNEY DISEASE (HCC): ICD-10-CM

## 2022-01-21 DIAGNOSIS — N18.30 BENIGN HYPERTENSION WITH CHRONIC KIDNEY DISEASE, STAGE III (HCC): ICD-10-CM

## 2022-01-21 DIAGNOSIS — N18.4 ANEMIA DUE TO STAGE 4 CHRONIC KIDNEY DISEASE (HCC): ICD-10-CM

## 2022-01-21 DIAGNOSIS — I12.9 BENIGN HYPERTENSION WITH CHRONIC KIDNEY DISEASE, STAGE III (HCC): ICD-10-CM

## 2022-01-21 DIAGNOSIS — N18.32 STAGE 3B CHRONIC KIDNEY DISEASE (HCC): ICD-10-CM

## 2022-01-21 LAB
ANION GAP SERPL CALCULATED.3IONS-SCNC: 5 MMOL/L (ref 4–13)
BUN SERPL-MCNC: 26 MG/DL (ref 5–25)
CALCIUM SERPL-MCNC: 9.8 MG/DL (ref 8.3–10.1)
CHLORIDE SERPL-SCNC: 107 MMOL/L (ref 100–108)
CO2 SERPL-SCNC: 26 MMOL/L (ref 21–32)
CREAT SERPL-MCNC: 1.77 MG/DL (ref 0.6–1.3)
ERYTHROCYTE [DISTWIDTH] IN BLOOD BY AUTOMATED COUNT: 14.2 % (ref 11.6–15.1)
FERRITIN SERPL-MCNC: 9 NG/ML (ref 8–388)
GFR SERPL CREATININE-BSD FRML MDRD: 35 ML/MIN/1.73SQ M
GLUCOSE P FAST SERPL-MCNC: 95 MG/DL (ref 65–99)
HCT VFR BLD AUTO: 30.2 % (ref 36.5–49.3)
HGB BLD-MCNC: 9.7 G/DL (ref 12–17)
IRON SATN MFR SERPL: 7 % (ref 20–50)
IRON SERPL-MCNC: 25 UG/DL (ref 65–175)
MAGNESIUM SERPL-MCNC: 2 MG/DL (ref 1.6–2.6)
MCH RBC QN AUTO: 28 PG (ref 26.8–34.3)
MCHC RBC AUTO-ENTMCNC: 32.1 G/DL (ref 31.4–37.4)
MCV RBC AUTO: 87 FL (ref 82–98)
PLATELET # BLD AUTO: 233 THOUSANDS/UL (ref 149–390)
PMV BLD AUTO: 10.4 FL (ref 8.9–12.7)
POTASSIUM SERPL-SCNC: 4.1 MMOL/L (ref 3.5–5.3)
RBC # BLD AUTO: 3.46 MILLION/UL (ref 3.88–5.62)
SODIUM SERPL-SCNC: 138 MMOL/L (ref 136–145)
TIBC SERPL-MCNC: 358 UG/DL (ref 250–450)
WBC # BLD AUTO: 5.22 THOUSAND/UL (ref 4.31–10.16)

## 2022-01-21 PROCEDURE — 85027 COMPLETE CBC AUTOMATED: CPT

## 2022-01-21 PROCEDURE — 83540 ASSAY OF IRON: CPT

## 2022-01-21 PROCEDURE — 80048 BASIC METABOLIC PNL TOTAL CA: CPT

## 2022-01-21 PROCEDURE — 36415 COLL VENOUS BLD VENIPUNCTURE: CPT

## 2022-01-21 PROCEDURE — 83735 ASSAY OF MAGNESIUM: CPT

## 2022-01-21 PROCEDURE — 83550 IRON BINDING TEST: CPT

## 2022-01-21 PROCEDURE — 82728 ASSAY OF FERRITIN: CPT

## 2022-01-21 NOTE — TELEPHONE ENCOUNTER
Discussed with patient  Renal function stable at cr 1 7     Hb stable at 9 7 iron sat 7 %    Plan:  Stressed on oral iron tablets to take daily instead of weekly   Gets it OTC  Wbc better, will be seeing hem/onc in feb

## 2022-01-27 ENCOUNTER — HOSPITAL ENCOUNTER (OUTPATIENT)
Dept: RADIOLOGY | Facility: MEDICAL CENTER | Age: 77
Discharge: HOME | End: 2022-01-27
Payer: MEDICARE

## 2022-01-27 DIAGNOSIS — I65.23 ASYMPTOMATIC BILATERAL CAROTID ARTERY STENOSIS: ICD-10-CM

## 2022-01-27 DIAGNOSIS — T82.858D STENOSIS OF NONCORONARY BYPASS GRAFT, SUBSEQUENT ENCOUNTER: ICD-10-CM

## 2022-01-27 PROCEDURE — 93880 EXTRACRANIAL BILAT STUDY: CPT

## 2022-01-27 PROCEDURE — 93880 EXTRACRANIAL BILAT STUDY: CPT | Performed by: SURGERY

## 2022-01-27 PROCEDURE — 93925 LOWER EXTREMITY STUDY: CPT | Performed by: SURGERY

## 2022-01-27 PROCEDURE — 93923 UPR/LXTR ART STDY 3+ LVLS: CPT

## 2022-01-27 PROCEDURE — 93922 UPR/L XTREMITY ART 2 LEVELS: CPT | Performed by: SURGERY

## 2022-01-27 PROCEDURE — 93925 LOWER EXTREMITY STUDY: CPT

## 2022-02-01 ENCOUNTER — TELEPHONE (OUTPATIENT)
Dept: VASCULAR SURGERY | Facility: CLINIC | Age: 77
End: 2022-02-01

## 2022-02-01 NOTE — TELEPHONE ENCOUNTER
Attempted to contact patient to schedule appointment(s) listed below  Requested patient call [phone] option 3 to schedule appointment(s)  Patient's appointment(s) are due on or after 01/28/22      Dopplers  [] Abdominal Aorta Iliac (AOIL)  [x] Carotid (CV) done 01/27/22  [] Celiac and/or Mesenteric  [] Endovascular Aortic Repair (EVAR)   [] Hemodialysis Access (HD)   [x] Lower Limb Arterial (FREDO) done 01/27/22  [] Lower Limb Venous Duplex with Reflux (LEVDR)  [] Renal Artery  [] Upper Limb (UEA)    Advanced Imaging   [] CTA abdomen    [] CTA abdomen & pelvis    [] CT abdomen with/ without contrast  [] CT abdomen with contrast  [] CT abdomen without contrast    [] CT abdomen & pelvis with/ without contrast  [] CT abdomen & pelvis with contrast  [] CT abdomen & pelvis without contrast    Office Visit   [] New patient, patient last seen over 3 years ago  [] Risk factor modification (RFM)   [x] Follow up due now

## 2022-02-08 ENCOUNTER — CONSULT (OUTPATIENT)
Dept: HEMATOLOGY ONCOLOGY | Facility: CLINIC | Age: 77
End: 2022-02-08
Payer: MEDICARE

## 2022-02-08 VITALS
HEART RATE: 18 BPM | WEIGHT: 170.5 LBS | OXYGEN SATURATION: 97 % | SYSTOLIC BLOOD PRESSURE: 178 MMHG | BODY MASS INDEX: 24.41 KG/M2 | DIASTOLIC BLOOD PRESSURE: 48 MMHG | HEIGHT: 70 IN | TEMPERATURE: 97.3 F | RESPIRATION RATE: 62 BRPM

## 2022-02-08 DIAGNOSIS — D72.819 LEUKOPENIA, UNSPECIFIED TYPE: ICD-10-CM

## 2022-02-08 DIAGNOSIS — D63.1 ANEMIA DUE TO STAGE 4 CHRONIC KIDNEY DISEASE (HCC): ICD-10-CM

## 2022-02-08 DIAGNOSIS — N18.4 ANEMIA DUE TO STAGE 4 CHRONIC KIDNEY DISEASE (HCC): ICD-10-CM

## 2022-02-08 DIAGNOSIS — D50.8 OTHER IRON DEFICIENCY ANEMIA: Primary | ICD-10-CM

## 2022-02-08 PROCEDURE — 99203 OFFICE O/P NEW LOW 30 MIN: CPT

## 2022-02-08 NOTE — PATIENT INSTRUCTIONS
Iron Rich Diet   AMBULATORY CARE:   An iron-rich diet  includes foods that are good sources of iron  People need extra iron during childhood, adolescence (teenage years), and pregnancy  Iron is a mineral that your body needs to make hemoglobin  Hemoglobin is part of your blood and helps carry oxygen from your lungs to the rest of your body  Eat iron-rich foods and vitamin C every day to prevent iron deficiency anemia  Iron deficiency anemia can lead to other health problems in adults and growth or development problems in children  Daily iron needs:   · Males:      ? 3to 1years old: 7 mg    ? 3to 6years old: 10 mg    ? 5to 15years old: 8 mg    ? 15to 25years old: 11 mg    ? 19 years and older: 8 mg    · Females:      ? 3to 1years old: 7 mg    ? 3to 6years old: 10 mg    ? 5to 15years old: 8 mg    ? 15to 25years old: 15 mg    ? 19 to 50 years: 18 mg    ? Over 46years old: 8 mg    ? Pregnant women:  27 mg    Foods that contain iron:   · Meat, fish, and poultry are good sources of iron  They contain heme iron, a form of iron that your body absorbs very well  Fruit, vegetables, eggs, and grains such as pasta, rice, and cereal also contain iron  They contain nonheme iron, a form of iron that is not absorbed as well as heme iron  You can absorb more iron from these foods by eating a food that is high in vitamin C at the same time  You can also absorb more nonheme iron by eating a food from the meat, fish, and poultry group at the same time  · Fish and shellfish contain some mercury, a metal that can be harmful to the body  Children and unborn babies are at higher risk for harm caused by mercury  Children and pregnant women should avoid eating fish high in mercury, such as shark and swordfish  They should also eat only fish that are lower in mercury, such as salmon, canned light tuna, and catfish   Limit the amount of low-mercury fish and shellfish you eat to less than 12 ounces per week     Iron-rich foods:   · Foods that contain 2 mg or more per serving:      ? 3 ounces of cooked beef (ivy, eye of round) or cooked turkey (dark meat)    ? ½ cup of beans (black, kidney, or lentil, or soybeans)    ? ½ cup of tofu    ? 1 medium baked potato    ? 1 cup of cooked artichoke or cooked spinach    ? ¾ cup of instant oatmeal    ? 1 cup of corn flakes    · Foods that contain 1 to 2 mg per serving:      ? 3 ounces of chicken    ? 3 ounces of pork    ? 3 ounces of turkey (light meat)    ? 3 ounces of light tuna    ? ½ cup of seedless, packed raisins    ? 1 slice of whole-wheat or white bread       Good sources of vitamin C:  Eat a serving of vitamin C with any iron-rich food to help your body absorb more iron  The following fruits and vegetables are good sources of vitamin C:  · 1 cup of fresh orange juice (124 mg) or pink grapefruit juice (83 mg)    · 1 cup of strawberries (106 mg)    · 1 cup of diced cantaloupe (68 mg)    · 1 cup of sweet yellow pepper (283 mg)    · 1 cup of fresh, boiled broccoli (116 mg) or cooked brussels sprouts (97 mg)    · 1 cup of kale (53 mg)    · 1 cup of tomato juice (45 mg)       Other guidelines to follow:   · Tea and coffee can decrease the amount of iron that your body absorbs from iron-rich foods  Drink coffee and tea separately from meals that contain iron-rich foods  · Have foods and liquids high in calcium separately from iron-rich foods  Calcium prevents iron from being absorbed  Cow's milk and products made from it, such as cheese and yogurt, are high in calcium  Children older than 1 year only need about 24 ounces of cow's milk each day  Other foods high in calcium include leafy greens, green vegetables, almonds, and canned sardines  © Copyright [employer] 2021 Information is for End User's use only and may not be sold, redistributed or otherwise used for commercial purposes   All illustrations and images included in CareNotes® are the copyrighted property of A D A M , Inc  or [facility] Jame Villarreal   The above information is an  only  It is not intended as medical advice for individual conditions or treatments  Talk to your doctor, nurse or pharmacist before following any medical regimen to see if it is safe and effective for you

## 2022-02-08 NOTE — PROGRESS NOTES
[address] [facility]  [facility] [place] [id number]  Hematology Ambulatory Consult  Haley Bains , 1945, [de-identified]  2/8/2022    Assessment/Plan:  1  Anemia due to stage 4 chronic kidney disease (HCC)  2  Other iron deficiency anemia  Mr Roach is a 64year-old anemia related to stage IV CKD as well as a new diagnosis of iron deficiency likely secondary to a colitis flare and poor oral intake  He has started taken oral iron supplement for 2 weeks but is suffering from constipation  I suggested he try "slow FE" and take MiraLax or fiber supplement  I did recommend IV iron replacement but the patient wishes to stay on the oral iron and recheck his blood work in 2 months  We will discuss interventions needed at that time regarding his iron supplementation  I also provided him a list of iron rich foods and he will also take vitamin-C along with his iron supplement  Instructed him to call the office if he becomes symptomatic with significant fatigue, shortness of breath, leg swelling or any other new symptoms that he may exhibit and we can recheck his lab work sooner  - Ambulatory referral to Hematology / Oncology  - CBC and differential; Future  - Iron Panel (Includes Ferritin, Iron Sat%, Iron, and TIBC); Future    3  Leukopenia, unspecified type  This is resolved on his last blood work  He did have an episode of leukocytosis during his colitis flare and 1 incident of leukopenia with a white blood cell count of 4 13      - Ambulatory referral to Hematology / Oncology      Patient does not wish to schedule follow-up at this time and will call with blood work results in 2 months and decide on follow-up schedule at that time  Patient voiced agreement and understanding to the above  Patient knows to call the Hematology/Oncology office with any questions and concerns regarding the above  Barrier(s) to care: None     The patient is able to self care   -------------------------------------------------------------------------------------------------------    Chief Complaint   Patient presents with    Consult     Anemia       Referring provider:  Falguni Mtz MD  [address] [place] [facility],  [address]    History of present illness:  Sara Baker is a 73-year-old male seen in consultation at the request of his nephrologist for new diagnosis of iron deficiency anemia  The patient says that he did not have a problem with iron deficiency prior to a severe case of colitis flare  He was unable to tolerate much oral intake and his appetite was terrible  He at that time he also became dehydrated due to the excessive amounts of diarrhea  He denies ever being told that she or a family member is diagnosed with thalassemia  He denies history of gastric bypass  He last colonoscopy was 12/21/22 and found multiple small superficial linear benign-appearing ulcers in the cecum  Colitis and mild localized diverticula in the descending and sigmoid colon also to internal grade 2 hemorrhoids  He denies pagophagia, pica, restless leg syndrome, brittle nails, thinning hair, or pallor  He denies coffee ground stools, tarry stools, bright red blood per rectum, hematuria  He denies fatigue, lightheadedness, dizziness, shortness of breath, DEMARCO, palpitations, or chest pain  He denies fevers, chills, unintentional weight loss, abdominal pain/distension, nausea, vomiting, constipation, diarrhea, petechiae/purpura, unexplained ecchymosis, or LE swelling  Feosol oral iron was suggested to him and he has been taking for about 2 weeks  Since starting this he has had significant constipation  Review of Systems   Constitutional: Negative for activity change, appetite change, fatigue and unexpected weight change  HENT: Negative for trouble swallowing and voice change  Eyes: Negative for visual disturbance     Respiratory: Negative for cough, chest tightness and shortness of breath  Cardiovascular: Negative for chest pain, palpitations and leg swelling  Gastrointestinal: Negative for abdominal pain, blood in stool, constipation, diarrhea, nausea and vomiting  Endocrine: Negative for cold intolerance and heat intolerance  Genitourinary: Negative for difficulty urinating, dysuria, frequency, hematuria and urgency  Musculoskeletal: Negative for arthralgias, joint swelling and myalgias  Skin: Negative for rash  Neurological: Negative for dizziness, syncope, weakness, light-headedness, numbness and headaches  Hematological: Negative for adenopathy  Does not bruise/bleed easily  Psychiatric/Behavioral: Negative for confusion and sleep disturbance  All other systems reviewed and are negative        Patient Active Problem List   Diagnosis    Atherosclerosis of native arteries of extremities with intermittent claudication, bilateral legs ([facility])    PAD (peripheral artery disease) ([facility])    Stenosis of noncoronary bypass graft ([facility] )    DM (diabetes mellitus) with peripheral vascular complication ([facility])    Asymptomatic bilateral carotid artery stenosis    S/P CABG (coronary artery bypass graft)    Essential hypertension    [facility]o-iliac disease [facility]    Renal artery stenosis, native, bilateral ([facility] )    Dyslipidemia    CAD (coronary artery disease)    Progressive angina ([facility])    Tension headache    Cigarette nicotine dependence with nicotine-induced disorder    Calcific tendinitis of right shoulder region    Right shoulder pain    Nonrheumatic aortic valve stenosis    Sinus bradycardia    Benign hypertension with chronic kidney disease, stage III ([facility])    Controlled type 2 diabetes mellitus with stage 3 chronic kidney disease, without long-term current use of insulin ([facility])    GERD (gastroesophageal reflux disease)    Hyperlipidemia    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)    Persistent proteinuria, unspecified    Anemia due to stage 4 chronic kidney disease (HCC)    Leukopenia    Hypomagnesemia    Renal cyst, left       Past Medical History:   Diagnosis Date    Arteriosclerosis of arteries of extremities (HCC)     CAD (coronary artery disease)     Chronic kidney disease     Diabetes [facility]     Dyslipidemia     Endocrine pancreas disorder     GERD (gastroesophageal reflux disease)     Hyperlipidemia     Hypertension     Kidney stones     PAD (peripheral artery disease) ([facility] )        Past Surgical History:   Procedure Laterality Date    APPENDECTOMY      COLECTOMY      COLONOSCOPY  2013    CORONARY ARTERY BYPASS GRAFT  2013    X 2    FEMORAL ARTERY - POPLITEAL ARTERY BYPASS GRAFT      HEMORRHOID SURGERY      IR AORTAGRAM WITH RUN-OFF  11/19/2018    [place] SLCTV CATHJ 3RD+ ORD SLCTV ABDL PEL/LXTR [facility] Left 8/12/2016    Procedure: LEFT FEMORAL ARTERIOGRAM; BALLOON ANGIOPLASTY; SFA  AND FEMORAL AT VEIN GRAFT;  Surgeon: Gavin Miller MD;  [facility];  Service: Vascular    TONSILLECTOMY AND ADENOIDECTOMY         Family History   Problem Relation Age of Onset    Heart attack Father     Other Sister         bypass and vlave replacement    Stroke Paternal Uncle     Arrhythmia Neg Hx     Asthma Neg Hx     Clotting disorder Neg Hx     Fainting Neg Hx     Anuerysm Neg Hx     Hypertension Neg Hx         unsure     Hyperlipidemia Neg Hx     Heart failure Neg Hx        Social History     Socioeconomic History    Marital status:      Spouse name: Not on file    Number of children: Not on file    Years of education: Not on file    Highest education level: Not on file   Occupational History    Not on file   Tobacco Use    Smoking status: Current Every Day Smoker     Packs/day: 1 00     Years: 50 00     Pack years: 50 00     Types: Cigarettes    Smokeless tobacco: Never Used   Vaping Use    Vaping Use: Never used   Substance and Sexual Activity    Alcohol use: Yes     Comment: occasional    Drug use: No    Sexual activity: Not on file   Other Topics Concern    Not on file   Social History Narrative    · Most recent tobacco use screenin2018      · Do you currently or have you served in [employer]: Yes      · If Yes, What branch of service:   [employer]      · Occupation:   Dentists      · Exercise level:   Occasional        · Caffeine intake:   Heavy      · Marital status:         · Diet:   Regular  low fat     · Seat belts used routinely:   Yes      · Smoke alarm in home: Yes      · Advance directive:    Yes      · General stress level:   Low      Social Determinants of Health     Financial Resource Strain: Not on file   Food Insecurity: Not on file   Transportation Needs: Not on file   Physical Activity: Not on file   Stress: Not on file   Social Connections: Not on file   Intimate Partner Violence: Not on file   Housing Stability: Not on file         Current Outpatient Medications:     amLODIPine (NORVASC) 10 mg tablet, TAKE 1 TABLET DAILY, Disp: 90 tablet, Rfl: 3    AMYLASE-LIPASE-PROTEASE PO, Take by mouth , Disp: , Rfl:     aspirin (ECOTRIN LOW STRENGTH) 81 mg EC tablet, Take 81 mg by mouth daily, Disp: , Rfl:     atorvastatin (LIPITOR) 80 mg tablet, TAKE 1 TABLET DAILY AT     BEDTIME, Disp: 90 tablet, Rfl: 3    benazepril (LOTENSIN) 40 MG tablet, TAKE 1 TABLET DAILY, Disp: 90 tablet, Rfl: 3    clopidogrel (PLAVIX) 75 mg tablet, TAKE 1 TABLET DAILY, Disp: 90 tablet, Rfl: 3    Cyanocobalamin (VITAMIN B12 PO), Take by mouth once a week , Disp: , Rfl:     dicyclomine (BENTYL) 20 mg tablet, Take 1 tablet (20 mg total) by mouth 2 (two) times a day as needed (diarrhea, abdominal cramping), Disp: 10 tablet, Rfl: 0    Fluad Quadrivalent 0 5 ML PRSY, PHARMACY ADMINISTERED, Disp: , Rfl:     hydrALAZINE (APRESOLINE) 50 mg tablet, Take 1 tablet (50 mg total) by mouth 3 (three) times a day, Disp: 270 tablet, Rfl: 3    Magnesium Oxide (MAG-200 PO), Take by mouth once a week , Disp: , Rfl:     metFORMIN (GLUCOPHAGE) 500 mg tablet, TAKE 2 TABLETS TWICE A DAY, Disp: 360 tablet, Rfl: 1    Multiple Vitamin (MULTIVITAMIN) tablet, Take 1 tablet by mouth daily  , Disp: , Rfl:     ondansetron (ZOFRAN) 4 mg tablet, Take 1 tablet (4 mg total) by mouth every 8 (eight) hours as needed for nausea or vomiting, Disp: 20 tablet, Rfl: 0    pantoprazole (PROTONIX) 40 mg tablet, Take 1 tablet (40 mg total) by mouth daily, Disp: 90 tablet, Rfl: 1    No Known Allergies    Objective:  BP (!) 178/48 (BP Location: Left arm, Cuff Size: Standard)   Pulse (!) 18   Temp (!) 97 3 °F (36 3 °C)   Resp (!) 62   Ht 5' 10" (1 778 m)   Wt 77 3 kg (170 lb 8 oz)   SpO2 97%   BMI 24 46 kg/m²   Physical Exam  Constitutional:       General: He is not in acute distress  Appearance: Normal appearance  He is normal weight  He is not ill-appearing  HENT:      Head: Normocephalic  Mouth/Throat:      Mouth: Mucous membranes are moist       Pharynx: Oropharynx is clear  Eyes:      Extraocular Movements: Extraocular movements intact  Conjunctiva/sclera: Conjunctivae normal    Cardiovascular:      Rate and Rhythm: Normal rate and regular rhythm  Pulses: Normal pulses  Heart sounds: Murmur heard  Pulmonary:      Effort: Pulmonary effort is normal  No respiratory distress  Breath sounds: Normal breath sounds  No wheezing or rhonchi  Chest:   Breasts:      Right: No axillary adenopathy or supraclavicular adenopathy  Left: No axillary adenopathy or supraclavicular adenopathy  Abdominal:      General: Abdomen is flat  Bowel sounds are normal  There is no distension  Palpations: Abdomen is soft  Tenderness: There is no abdominal tenderness  Musculoskeletal:      Cervical back: No tenderness  Right lower leg: No edema  Left lower leg: No edema     Lymphadenopathy:      Head:      Right side of head: No submandibular adenopathy  Left side of head: No submandibular adenopathy  Cervical: No cervical adenopathy  Right cervical: No superficial cervical adenopathy  Left cervical: No superficial cervical adenopathy  Upper Body:      Right upper body: No supraclavicular or axillary adenopathy  Left upper body: No supraclavicular or axillary adenopathy  Skin:     General: Skin is warm and dry  Findings: No bruising, erythema or rash  Neurological:      General: No focal deficit present  Mental Status: He is alert and oriented to person, place, and time  Mental status is at baseline  Gait: Gait normal    Psychiatric:         Mood and Affect: Mood normal          Behavior: Behavior normal          Thought Content: Thought content normal          Judgment: Judgment normal          Result Review  Labs:   Appointment on 01/21/2022   Component Date Value Ref Range Status    Sodium 01/21/2022 138  136 - 145 mmol/L Final    Potassium 01/21/2022 4 1  3 5 - 5 3 mmol/L Final    Chloride 01/21/2022 107  100 - 108 mmol/L Final    CO2 01/21/2022 26  21 - 32 mmol/L Final    ANION GAP 01/21/2022 5  4 - 13 mmol/L Final    BUN 01/21/2022 26* 5 - 25 mg/dL Final    Creatinine 01/21/2022 1 77* 0 60 - 1 30 mg/dL Final    Standardized to IDMS reference method    Glucose, Fasting 01/21/2022 95  65 - 99 mg/dL Final    Specimen collection should occur prior to Sulfasalazine administration due to the potential for falsely depressed results  Specimen collection should occur prior to Sulfapyridine administration due to the potential for falsely elevated results      Calcium 01/21/2022 9 8  8 3 - 10 1 mg/dL Final    eGFR 01/21/2022 35  ml/min/1 73sq m Final    WBC 01/21/2022 5 22  4 31 - 10 16 Thousand/uL Final    RBC 01/21/2022 3 46* 3 88 - 5 62 Million/uL Final    Hemoglobin 01/21/2022 9 7* 12 0 - 17 0 g/dL Final    Hematocrit 01/21/2022 30 2* 36 5 - 49 3 % Final    MCV 01/21/2022 87  82 - 98 fL Final    MCH 2022 28 0  26 8 - 34 3 pg Final    MCHC 2022 32 1  31 4 - 37 4 g/dL Final    RDW 2022 14 2  11 6 - 15 1 % Final    Platelets  233  149 - 390 Thousands/uL Final    MPV 2022 10 4  8 9 - 12 7 fL Final    Magnesium 2022 2 0  1 6 - 2 6 mg/dL Final    Iron Saturation 2022 7* 20 - 50 % Final    TIBC 2022 358  250 - 450 ug/dL Final    Iron 2022 25* 65 - 175 ug/dL Final    Patients treated with metal-binding drugs (ie  Deferoxamine) may have depressed iron values   Ferritin 2022 9  8 - 388 ng/mL Final   Consult on 01/10/2022   Component Date Value Ref Range Status    LEUKOCYTE ESTERASE,UA 01/10/2022 neg   Final    NITRITE,UA 01/10/2022 neg   Final    SL AMB POCT UROBILINOGEN 01/10/2022 neg   Final    POCT URINE PROTEIN 01/10/2022 pos (30)   Final     PH,UA 01/10/2022 5   Final    BLOOD,UA 01/10/2022 neg   Final    SPECIFIC GRAVITY,UA 01/10/2022 1 020   Final    KETONES,UA 01/10/2022 neg   Final    BILIRUBIN,UA 01/10/2022 neg   Final    GLUCOSE, UA 01/10/2022 neg   Final     COLOR,UA 01/10/2022 yellow   Final    CLARITY,UA 01/10/2022 clear   Final         Imagin/21/22: Colonoscopy   · Multiple small, superficial, linear, benign-appearing ulcers in the cecum; performed 3 cold forceps biopsies  Status post partial right colectomy, ileo colonic anastomotic site multiple small ulcer were seen, biopsies were done  · Mild, localized erythematous mucosa in the hepatic flexure; performed cold forceps biopsy to rule out colitis  · Few small mild localized diverticula in the descending colon and sigmoid colon  · Two internal (grade 2) hemorrhoids with no bleeding, located internally at four o'clock and eight o'clock       Please note: This report has been generated by a voice recognition software system  Therefore there may be syntax, spelling, and/or grammatical errors  Please call if you have any questions

## 2022-02-21 ENCOUNTER — OFFICE VISIT (OUTPATIENT)
Dept: GASTROENTEROLOGY | Facility: CLINIC | Age: 77
End: 2022-02-21
Payer: MEDICARE

## 2022-02-21 VITALS
HEART RATE: 64 BPM | BODY MASS INDEX: 24.34 KG/M2 | SYSTOLIC BLOOD PRESSURE: 177 MMHG | DIASTOLIC BLOOD PRESSURE: 50 MMHG | HEIGHT: 70 IN | WEIGHT: 170 LBS

## 2022-02-21 DIAGNOSIS — D50.8 IRON DEFICIENCY ANEMIA SECONDARY TO INADEQUATE DIETARY IRON INTAKE: ICD-10-CM

## 2022-02-21 DIAGNOSIS — K21.9 GASTROESOPHAGEAL REFLUX DISEASE WITHOUT ESOPHAGITIS: ICD-10-CM

## 2022-02-21 DIAGNOSIS — K55.9 ISCHEMIC COLITIS (HCC): Primary | ICD-10-CM

## 2022-02-21 PROCEDURE — 99214 OFFICE O/P EST MOD 30 MIN: CPT | Performed by: INTERNAL MEDICINE

## 2022-02-21 RX ORDER — ACETAMINOPHEN AND CODEINE PHOSPHATE 300; 30 MG/1; MG/1
1 TABLET ORAL
COMMUNITY
Start: 2022-01-25 | End: 2022-02-21

## 2022-02-21 NOTE — PROGRESS NOTES
[facility] - Outpatient Follow-up Note  Daya Foote  64 y o  male MRN: [id number]  Encounter: [id number]          ASSESSMENT AND PLAN:      1  Ischemic colitis ([address] )  Patient came back for follow-up after colonoscopy, he was complaining abdominal pain and bloody diarrhea, colonoscopy was done which shows ulceration along with colitis in the right colon, biopsy confirmed evidence of acute colitis, no evidence of CMV infection  It could be ischemic colitis because of underlying history of severe aortic stenosis, chronic kidney disease and dehydration  Patient was managed conservatively and he is now feeling better  Stool study was negative for any infectious etiology  Currently he denies any diarrhea or abdominal pain, he is following with nephrologist and he is scheduled for TAVR with cardiothoracic surgeon    2  Gastroesophageal reflux disease without esophagitis  Continue with pantoprazole 40 mg p o  q day, he is concerned about long-term PPI use and iron deficiency anemia, will lower the dose of pantoprazole, he will try to wean off from PPI, he will take every alternate day    3  Iron deficiency anemia secondary to inadequate dietary iron intake  Continue with iron supplement    4  PPI, exocrine pancreatic insufficiency- continue with pancreatic enzyme supplement, stool for elastase was checked recently    ______________________________________________________________________    SUBJECTIVE:  Patient was seen and examined, he come for follow-up after colonoscopy, he was complaining abdominal pain and diarrhea, colonoscopy confirmed evidence of right-sided ischemic colitis which was treated conservatively, stool study was negative for any infectious etiology, patient was given antibiotic from ER Augmentin which he finish  I discussed colonoscopy and biopsy    He is also following with hepatology, his kidney function improved, he has aortic  stenosis and cardiothoracic surgeon recommend TAVR       REVIEW OF SYSTEMS IS OTHERWISE NEGATIVE        Historical Information   Past Medical History:   Diagnosis Date    Arteriosclerosis of arteries of extremities (HCC)     CAD (coronary artery disease)     Chronic kidney disease     Diabetes ([address] )     Dyslipidemia     Endocrine pancreas disorder     GERD (gastroesophageal reflux disease)     Hyperlipidemia     Hypertension     Kidney stones     PAD (peripheral artery disease) (HCC)      Past Surgical History:   Procedure Laterality Date    APPENDECTOMY      COLECTOMY      COLONOSCOPY  2013    CORONARY ARTERY BYPASS GRAFT  2013    X 2    FEMORAL ARTERY - POPLITEAL ARTERY BYPASS GRAFT      HEMORRHOID SURGERY      IR AORTAGRAM WITH RUN-OFF  11/19/2018    [place] SLCTV CATHJ 3RD+ ORD SLCTV ABDL PEL/LXTR [facility] Left 8/12/2016    Procedure: LEFT FEMORAL ARTERIOGRAM; BALLOON ANGIOPLASTY; SFA  AND FEMORAL AT VEIN GRAFT;  Surgeon: Elizabeth Glasgow MD;  Location: [facility];  Service: Vascular    TONSILLECTOMY AND ADENOIDECTOMY       Social History   Social History     Substance and Sexual Activity   Alcohol Use Yes    Comment: occasional     Social History     Substance and Sexual Activity   Drug Use No     Social History     Tobacco Use   Smoking Status Current Every Day Smoker    Packs/day: 1 00    Years: 50 00    Pack years: 50 00    Types: Cigarettes   Smokeless Tobacco Never Used     Family History   Problem Relation Age of Onset    Heart attack Father     Other Sister         bypass and vlave replacement    Stroke Paternal Uncle     Arrhythmia Neg Hx     Asthma Neg Hx     Clotting disorder Neg Hx     Fainting Neg Hx     Anuerysm Neg Hx     Hypertension Neg Hx         unsure     Hyperlipidemia Neg Hx     Heart failure Neg Hx        Meds/Allergies       Current Outpatient Medications:     amLODIPine (NORVASC) 10 mg tablet    AMYLASE-LIPASE-PROTEASE PO    aspirin (ECOTRIN LOW STRENGTH) 81 mg EC tablet    atorvastatin (LIPITOR) 80 mg tablet    benazepril (LOTENSIN) 40 MG tablet    clopidogrel (PLAVIX) 75 mg tablet    Cyanocobalamin (VITAMIN B12 PO)    Fluad Quadrivalent 0 5 ML PRSY    hydrALAZINE (APRESOLINE) 50 mg tablet    Magnesium Oxide (MAG-200 PO)    metFORMIN (GLUCOPHAGE) 500 mg tablet    Multiple Vitamin (MULTIVITAMIN) tablet    ondansetron (ZOFRAN) 4 mg tablet    pantoprazole (PROTONIX) 40 mg tablet    No Known Allergies        Objective     Blood pressure (!) 177/50, pulse 64, height 5' 10" (1 778 m), weight 77 1 kg (170 lb)  Body mass index is 24 39 kg/m²  PHYSICAL EXAM:      General Appearance:   Alert, cooperative, no distress   HEENT:   Normocephalic, atraumatic, anicteric      Neck:  Supple, symmetrical, trachea midline   Lungs:   Clear to auscultation bilaterally; no rales, rhonchi or wheezing; respirations unlabored    Heart[de-identified]   Regular rate and rhythm; no murmur, rub, or gallop  Abdomen:   Soft, non-tender, non-distended; normal bowel sounds; no masses, no organomegaly    Genitalia:   Deferred    Rectal:   Deferred    Extremities:  No cyanosis, clubbing or edema    Pulses:  2+ and symmetric    Skin:  No jaundice, rashes, or lesions    Lymph nodes:  No palpable cervical lymphadenopathy        Lab Results:   No visits with results within 1 Day(s) from this visit     Latest known visit with results is:   Appointment on 01/21/2022   Component Date Value    Sodium 01/21/2022 138     Potassium 01/21/2022 4 1     Chloride 01/21/2022 107     CO2 01/21/2022 26     ANION GAP 01/21/2022 5     BUN 01/21/2022 26*    Creatinine 01/21/2022 1 77*    Glucose, Fasting 01/21/2022 95     Calcium 01/21/2022 9 8     eGFR 01/21/2022 35     WBC 01/21/2022 5 22     RBC 01/21/2022 3 46*    Hemoglobin 01/21/2022 9 7*    Hematocrit 01/21/2022 30 2*    MCV 01/21/2022 87     MCH 01/21/2022 28 0     MCHC 01/21/2022 32 1     RDW 01/21/2022 14 2     Platelets 95/26/6972 233     MPV 01/21/2022 10 4     Magnesium 01/21/2022 2 0     Iron Saturation 01/21/2022 7*    TIBC 01/21/2022 358     Iron 01/21/2022 25*    Ferritin 01/21/2022 9          Radiology Results:   VAS carotid complete study    Result Date: 1/27/2022  Narrative:  [facility] REPORT CLINICAL: Indications: 6 month surveillance of carotid artery disease  Patient is asymptomatic at this time  Operative History: 2018-11-19 Right anterior tibial artery angioplasty 2018-11-19 Right popliteal artery angioplasty 2018-11-19 Right superficial femoral artery angioplasty 2016-08-12 LEFT FEMORAL ARTERIOGRAM; BALLOON ANGIOPLASTY; SFA  AND FEMORAL AT VEIN GRAFT 2015-11-19 Left Femoro_anterior tibial bypass Greater saphenous CABG x 2 with lt gsv harvest Right Hemicolectomy Left CFA endarterectomy Risk Factors The patient has history of HTN, Hyperlipidemia, PAD, CAD and smoking (current) 0 25 ppd  He has no history of Diabetes  Clinical Right Pressure:  178/ mm Hg, Left Pressure:  163/ mm Hg  FINDINGS:  Right        Impression  PSV  EDV (cm/s)  Direction of Flow  Ratio  Dist  ICA                116          23                      1 89  Mid  ICA                 180          23                      2 92  Prox  ICA    70%+        398          53                      6 45  Dist CCA                  77          26                            Mid CCA                   62          16                      1 00  Prox CCA                  62          11                            Ext Carotid              389          19                      6 32  Prox Vert                 29           9  Antegrade                 Subclavian               188          12                             Left         Impression  PSV  EDV (cm/s)  Direction of Flow  Ratio  Dist  ICA                166          14                      1 51  Mid  ICA                 201          30                      1 84  Prox   ICA    70%+        347          49                      3 17  Dist CCA 135          16                            Mid CCA                  109          16                      0 74  Prox CCA                 147          13                            Ext Carotid              357          18                      3 26  Prox Vert                105          28  Antegrade                 Subclavian               287          17                               CONCLUSION:  Impression RIGHT: There is >70% stenosis noted in the internal carotid artery  Plaque is heterogenous and irregular  There is a severe stenosis in the external carotid artery  Vertebral artery flow is antegrade  There is no significant subclavian artery disease  LEFT: There is >70% stenosis noted in the internal carotid artery  Plaque is heterogenous and irregular  There is a severe stenosis in the external carotid artery  Vertebral artery flow is antegrade  Monophasic flow in the subclavian artery is suggestive of more proximal disease  Compared to previous study on 7/7/2021, there is no significant change in the disease process  Recommend repeat testing in 6month as per protocol unless otherwise indicated  SIGNATURE: Electronically Signed by: Gricelda Patel MD on 2022-01-27 10:02:09 PM    VAS lower limb arterial duplex, complete bilateral    Result Date: 1/27/2022  Narrative:  [facility] REPORT CLINICAL: Indications: 6 month surveillance for progression of disease  The patient complains of LLE edema  Operative History: 2018-11-19 Right anterior tibial artery angioplasty 2018-11-19 Right popliteal artery angioplasty 2018-11-19 Right superficial femoral artery angioplasty 2016-08-12 LEFT FEMORAL ARTERIOGRAM; BALLOON ANGIOPLASTY; SFA  AND FEMORAL AT VEIN GRAFT 2015-11-19 Left Femoro_anterior tibial bypass Greater saphenous CABG x 2 with lt gsv harvest Right Hemicolectomy Left CFA endarterectomy Risk Factors The patient has history of HTN, Hyperlipidemia, PAD, CAD and smoking (current) 0 25 ppd    He has no history of Diabetes  Clinical Right Pressure:  178/ mm Hg, Left Pressure:  163/ mm Hg  FINDINGS:  Right                  Impression  Waveform        PSV (cm/s)  EDV  Post  Tibial                       Not Identified                   Ant  Tibial                        Monophasic                       Common Femoral Artery                                      86   10  Prox Profunda          50-75%                             377   28  Prox SFA                                                  179   13  Mid SFA                50-75%                             334   28  Dist SFA               >75%                               412   27  Proximal Pop           <50%                               242   16  Distal Pop                                                 82   15  Prox Post Tibial       Occluded                             0    0  Dist Post Tibial       Occluded                             0    0  Prox  Ant  Tibial                                          47    0  Dist  Ant  Tibial                                          39    0   Left                   Impression  Waveform        PSV (cm/s)  EDV  AP (cm)  Post  Tibial                       Not Identified                            Ant   Tibial                        Biphasic                                  Inflow Anastomosis                                         95    9           Mid Thigh (Graft)                                          86    8           Low Thigh (Graft)                                          93   11           Near Knee (Graft)                                         109   11           High Calf (Graft)      >75%                               567   61           Outflow Anastomosis    <50%                                                  Common Femoral Artery  <50%                               152    9      1 7  Prox Profunda          >75%                               399   27           Prox SFA 82   10           Prox Post Tibial       Occluded                             0    0           Dist Post Tibial       Occluded                            10    5           Prox  Ant  Tibial                                          85   15           Dist  Ant  Tibial                                          80    3              CONCLUSION:  Impression: RIGHT LOWER LIMB: This resting evaluation shows moderate to severe diffuse lower extremity arterial atherosclerotic disease  There is a 50-75% stenosis in the proximal profunda, mid superficial femoral and proximal popliteal arteries  There is a >75% stenosis in the distal superficial femoral artery, which appears to be near occlusion  The posterior tibial artery is occluded  Ankle/Brachial index: 0 69, moderate range  Prior 0 32  PVR/ PPG tracings are dampened  Metatarsal pressure of 74 mmHg Great toe pressure of 75 mmHg, within the healing range  LEFT LOWER LIMB: The common femoral artery is ectatic in caliber, measuring 1 8 cm previously 1 8 cm  There is a <50% stenosis in the common femoral artery  There is a >75% stenosis in the proximal profunda artery and a <50% stenosis in the mid superficial femoral artery  The superficial femoral- anterior tibial artery bypass graft is patent, with a >75% stenosis in the distal portion of the graft and a 50-75% stenosis at the distal anastomosis  Ankle/Brachial index: 0 78, severe claudication range  Prior 0 64  PVR/ PPG tracings are slightly dampened  Metatarsal pressure 206 of mmHg Great toe pressure of 85 mmHg, within the healing range  Compared to previous study on 7/7/2021, there no significant change  Recommend repeat testing in 6 months as per protocol unless otherwise indicated    SIGNATURE: Electronically Signed by: Agapito Butts MD on 2022-01-27 10:02:49 PM

## 2022-02-23 DIAGNOSIS — E78.5 HYPERLIPIDEMIA, UNSPECIFIED HYPERLIPIDEMIA TYPE: ICD-10-CM

## 2022-02-23 RX ORDER — ATORVASTATIN CALCIUM 80 MG/1
TABLET, FILM COATED ORAL
Qty: 90 TABLET | Refills: 3 | Status: SHIPPED | OUTPATIENT
Start: 2022-02-23

## 2022-03-02 ENCOUNTER — OFFICE VISIT (OUTPATIENT)
Dept: CARDIOLOGY CLINIC | Facility: MEDICAL CENTER | Age: 77
End: 2022-03-02
Payer: MEDICARE

## 2022-03-02 VITALS
SYSTOLIC BLOOD PRESSURE: 158 MMHG | BODY MASS INDEX: 24.48 KG/M2 | DIASTOLIC BLOOD PRESSURE: 52 MMHG | WEIGHT: 171 LBS | HEART RATE: 63 BPM | OXYGEN SATURATION: 98 % | HEIGHT: 70 IN

## 2022-03-02 DIAGNOSIS — I35.0 NONRHEUMATIC AORTIC VALVE STENOSIS: ICD-10-CM

## 2022-03-02 DIAGNOSIS — I73.9 PAD (PERIPHERAL ARTERY DISEASE) (HCC): ICD-10-CM

## 2022-03-02 DIAGNOSIS — Z95.1 S/P CABG (CORONARY ARTERY BYPASS GRAFT): Chronic | ICD-10-CM

## 2022-03-02 DIAGNOSIS — I25.10 CORONARY ARTERY DISEASE INVOLVING NATIVE HEART WITHOUT ANGINA PECTORIS, UNSPECIFIED VESSEL OR LESION TYPE: ICD-10-CM

## 2022-03-02 DIAGNOSIS — I10 ESSENTIAL HYPERTENSION: Chronic | ICD-10-CM

## 2022-03-02 DIAGNOSIS — R00.1 SINUS BRADYCARDIA: Primary | ICD-10-CM

## 2022-03-02 DIAGNOSIS — I70.1 RENAL ARTERY STENOSIS, NATIVE, BILATERAL (HCC): Chronic | ICD-10-CM

## 2022-03-02 DIAGNOSIS — I65.23 ASYMPTOMATIC BILATERAL CAROTID ARTERY STENOSIS: Chronic | ICD-10-CM

## 2022-03-02 PROCEDURE — 99214 OFFICE O/P EST MOD 30 MIN: CPT | Performed by: INTERNAL MEDICINE

## 2022-03-02 NOTE — PROGRESS NOTES
Cardiology   Joselito Ibarra  64 y o  male MRN: [id number]         Impression:  1  Coronary artery disease s/p CABG - doing well  2  Hypertension - not adequate control  3  Severe bilateral carotid disease - followed by vascular surgery  4  Dyslipidemia - doing well  5  Peripheral arterial disease - s/p LLE revascularization  Doing well  6  Aortic stenosis - severe as of echo 5/21  Thinking about TAVR  7  Bradycardia - improved off b-blockers    8  CKD - GFR 35      Recommendations:  1  Continue current medications  2  Follow up with Vascular Surgery  3  Follow up with CT surgery for TAVR  4  Recheck echo to evaluate for valvular heart disease  5  Follow up in 6 months        HPI: Joselito Ibarra  is a 64y o  year old male  with coronary artery disease s/p CABG and LLE revascularization, severe aortic stenosis,  who returns for follow up  No chest pain, shortness of breath, or palpitations  Rare dizziness    Recent echo 5/21 demonstrated normal LV systolic function with severe aortic stenosis   Holter monitor demonstrated occasional bradycardia with maximum of 2 7 second pauses   Patient has been having issues with ischemic colitis  Is feeling slightly better - starting iron therapy  Does have some dyspnea on exertion, but improving  Is having increased LE edema  Review of Systems   Constitutional: Negative  HENT: Negative  Eyes: Negative  Respiratory: Positive for shortness of breath  Negative for chest tightness  Cardiovascular: Positive for leg swelling  Negative for chest pain and palpitations  Gastrointestinal: Negative  Endocrine: Negative  Genitourinary: Negative  Musculoskeletal: Negative  Skin: Negative  Allergic/Immunologic: Negative  Neurological: Negative  Hematological: Negative  Psychiatric/Behavioral: Negative  All other systems reviewed and are negative          Past Medical History:   Diagnosis Date    Arteriosclerosis of arteries of extremities ([facility] [address] )     CAD (coronary artery disease)     Chronic kidney disease     Diabetes ([facility] [address] )     Dyslipidemia     Endocrine pancreas disorder     GERD (gastroesophageal reflux disease)     Hyperlipidemia     Hypertension     Kidney stones     [facility]AD [facility]peripheral [facility])      Past Surgical History:   Procedure Laterality Date    APPENDECTOMY      COLECTOMY      COLONOSCOPY  2013    CORONARY ARTERY BYPASS GRAFT  2013    X 2    FEMORAL ARTERY - POPLITEAL ARTERY BYPASS GRAFT      HEMORRHOID SURGERY      IR AORTAGRAM WITH RUN-OFF  11/19/2018    ND SLCTV CATHJ 3RD+ ORD SLCTV ABDL PEL/LXTR [facility] Left 8/12/2016    Procedure: LEFT FEMORAL ARTERIOGRAM; BALLOON ANGIOPLASTY; SFA  AND FEMORAL AT VEIN GRAFT;  Surgeon: Meryle Cruel, MD;  [facility]: [facility];  Service: Vascular    TONSILLECTOMY AND ADENOIDECTOMY       Social History     Substance and Sexual Activity   Alcohol Use Yes    Comment: occasional     Social History     Substance and Sexual Activity   Drug Use No     Social History     Tobacco Use   Smoking Status Current Every Day Smoker    Packs/day: 1 00    Years: 50 00    Pack years: 50 00    Types: Cigarettes   Smokeless Tobacco Never Used     Family History   Problem Relation Age of Onset    Heart attack Father     Other Sister         bypass and vlave replacement    Stroke Paternal Uncle     Arrhythmia Neg Hx     Asthma Neg Hx     Clotting disorder Neg Hx     Fainting Neg Hx     Anuerysm Neg Hx     Hypertension Neg Hx         unsure     Hyperlipidemia Neg Hx     Heart failure Neg Hx        Allergies:  No Known Allergies    Medications:     Current Outpatient Medications:     amLODIPine (NORVASC) 10 mg tablet, TAKE 1 TABLET DAILY, Disp: 90 tablet, Rfl: 3    AMYLASE-LIPASE-PROTEASE PO, Take by mouth , Disp: , Rfl:     aspirin (ECOTRIN LOW STRENGTH) 81 mg EC tablet, Take 81 mg by mouth daily, Disp: , Rfl:     atorvastatin (LIPITOR) 80 mg tablet, TAKE 1 TABLET DAILY AT     BEDTIME, Disp: 90 tablet, Rfl: 3    benazepril (LOTENSIN) 40 MG tablet, TAKE 1 TABLET DAILY, Disp: 90 tablet, Rfl: 3    clopidogrel (PLAVIX) 75 mg tablet, TAKE 1 TABLET DAILY, Disp: 90 tablet, Rfl: 3    Cyanocobalamin (VITAMIN B12 PO), Take by mouth once a week , Disp: , Rfl:     Fluad Quadrivalent 0 5 ML PRSY, PHARMACY ADMINISTERED, Disp: , Rfl:     hydrALAZINE (APRESOLINE) 50 mg tablet, Take 1 tablet (50 mg total) by mouth 3 (three) times a day, Disp: 270 tablet, Rfl: 3    Magnesium Oxide (MAG-200 PO), Take by mouth once a week , Disp: , Rfl:     metFORMIN (GLUCOPHAGE) 500 mg tablet, TAKE 2 TABLETS TWICE A DAY, Disp: 360 tablet, Rfl: 1    Multiple Vitamin (MULTIVITAMIN) tablet, Take 1 tablet by mouth daily  , Disp: , Rfl:     ondansetron (ZOFRAN) 4 mg tablet, Take 1 tablet (4 mg total) by mouth every 8 (eight) hours as needed for nausea or vomiting, Disp: 20 tablet, Rfl: 0    pantoprazole (PROTONIX) 40 mg tablet, Take 1 tablet (40 mg total) by mouth daily, Disp: 90 tablet, Rfl: 1      Wt Readings from Last 3 Encounters:   03/02/22 77 6 kg (171 lb)   02/21/22 77 1 kg (170 lb)   02/08/22 77 3 kg (170 lb 8 oz)     Temp Readings from Last 3 Encounters:   02/08/22 (!) 97 3 °F (36 3 °C)   12/21/21 97 8 °F (36 6 °C) (Temporal)   12/07/21 98 °F (36 7 °C) (Oral)     BP Readings from Last 3 Encounters:   03/02/22 158/52   02/21/22 (!) 177/50   02/08/22 (!) 178/48     Pulse Readings from Last 3 Encounters:   03/02/22 63   02/21/22 64   02/08/22 (!) 18         Physical Exam  HENT:      Head: Atraumatic  Mouth/Throat:      Mouth: Mucous membranes are moist    Eyes:      Extraocular Movements: Extraocular movements intact  Cardiovascular:      Rate and Rhythm: Normal rate and regular rhythm  Heart sounds: Murmur heard  Systolic murmur is present with a grade of 2/6  Pulmonary:      Effort: Pulmonary effort is normal       Breath sounds: Normal breath sounds     Abdominal: General: Abdomen is flat  Musculoskeletal:         General: Normal range of motion  Cervical back: Normal range of motion  Skin:     General: Skin is warm  Neurological:      General: No focal deficit present  Mental Status: He is alert and oriented to person, place, and time  Psychiatric:         Mood and Affect: Mood normal          Behavior: Behavior normal            Laboratory Studies:  CMP:  Lab Results   Component Value Date     2015    K 4 1 2022     2022    CO2 26 2022    ANIONGAP 7 2015    BUN 26 (H) 2022    CREATININE 1 77 (H) 2022    GLUCOSE 125 2015    AST 12 2021    ALT 17 2021    EGFR 35 2022       Lipid Profile:   No results found for: CHOL  Lab Results   Component Value Date    HDL 31 (L) 2021     Lab Results   Component Value Date    LDLCALC 36 2021     Lab Results   Component Value Date    TRIG 90 2021       Cardiac testing:     Results for orders placed during the hospital encounter of 21    Echo complete with contrast if indicated    Narrative  [place] [id number] [address] [place] [address]  [phone]    Transthoracic Echocardiogram  2D, M-mode, Doppler, and Color Doppler    Study date:  06-May-2021    Patient: Susan Bruce  MR number: [de-identified]  Account number: [de-identified]  : 1945  Age: 64 years  Gender: Male  Status: Outpatient  Location: [place] [address]   Height: 70 in  Weight: 181 lb  BP: 150/ 58 mmHg    Indications: Assess the aortic valve  Diagnoses: I35 0 - Nonrheumatic aortic (valve) stenosis    Sonographer:  Samanta Crespo RDCS  Primary Physician:  Sanchez Espinoza MD  Referring Physician:  Arjun Manley MD  Group:  [facility]  Interpreting Physician:  María Tesfaye MD    SUMMARY    LEFT VENTRICLE:  Systolic function was normal  Ejection fraction was estimated to be 60 %    There were no regional wall motion abnormalities  Wall thickness was mildly increased  Doppler parameters were consistent with abnormal left ventricular relaxation (grade 1 diastolic dysfunction)  LEFT ATRIUM:  The atrium was mildly dilated  MITRAL VALVE:  There was mild regurgitation  AORTIC VALVE:  The valve was probably trileaflet  Leaflets exhibited moderately increased thickness and moderate calcification  There was severe stenosis  There was trace regurgitation  Valve peak gradient was 63 mmHg  Valve mean gradient was 32 mmHg  Aortic valve area was 1 cmï¾² by the continuity equation  Estimated aortic valve area (by VTI) was 0 91 cmï¾²  TRICUSPID VALVE:  There was trace regurgitation  HISTORY: PRIOR HISTORY: CAD, bradycardia, DM, dyslipidemia, PAD, CABG,    PROCEDURE: The study was performed in the [address]  This was a routine study  The transthoracic approach was used  The study included complete 2D imaging, M-mode, complete spectral Doppler, and color Doppler  The heart rate was  66 bpm, at the start of the study  Images were obtained from the parasternal, apical, subcostal, and suprasternal notch acoustic windows  Image quality was adequate  LEFT VENTRICLE: Size was normal  Systolic function was normal  Ejection fraction was estimated to be 60 %  There were no regional wall motion abnormalities  Wall thickness was mildly increased  DOPPLER: Doppler parameters were consistent  with abnormal left ventricular relaxation (grade 1 diastolic dysfunction)  RIGHT VENTRICLE: The size was normal  Systolic function was normal  Wall thickness was normal     LEFT ATRIUM: The atrium was mildly dilated  RIGHT ATRIUM: Size was normal     MITRAL VALVE: Valve structure was normal  There was normal leaflet separation  DOPPLER: The transmitral velocity was within the normal range  There was no evidence for stenosis  There was mild regurgitation  AORTIC VALVE: The valve was probably trileaflet   Leaflets exhibited moderately increased thickness and moderate calcification  DOPPLER: There was severe stenosis  There was trace regurgitation  TRICUSPID VALVE: The valve structure was normal  There was normal leaflet separation  DOPPLER: The transtricuspid velocity was within the normal range  There was no evidence for stenosis  There was trace regurgitation  PULMONIC VALVE: Leaflets exhibited normal thickness, no calcification, and normal cuspal separation  DOPPLER: The transpulmonic velocity was within the normal range  There was no significant regurgitation  PERICARDIUM: There was no pericardial effusion  The pericardium was normal in appearance  AORTA: The root exhibited normal size  SYSTEMIC VEINS: IVC: The inferior vena cava was normal in size  MEASUREMENT TABLES    2D MEASUREMENTS  LVOT   (Reference normals)  Diam   23 mm   (--)    DOPPLER MEASUREMENTS  LVOT   (Reference normals)  VTI   24 cm   (--)  R-R interval   1017 ms   (--)  HR   59 bpm   (--)  Stroke vol   99 71 ml   (--)  Cardiac output   5 88 L/min   (--)  Cardiac index   2 94 L/min/mï¾²   (--)  Aortic valve   (Reference normals)  VTI   108 cm   (--)  Peak gradient   63 mmHg   (--)  Mean gradient   32 mmHg   (--)  Valve area, cont   1 cmï¾²   (--)  Obstr index, VTI   0 22    (--)  Valve area, VTI   0 91 cmï¾²   (--)  Area index, VTI   0 46 cmï¾²/mï¾²   (--)    SYSTEM MEASUREMENT TABLES    2D  %FS: 23 52 %  Ao Diam: 3 06 cm  EDV(Teich): 137 67 ml  EF(Teich): 46 6 %  ESV(Teich): 73 51 ml  IVSd: 1 26 cm  LA Area: 24 18 cm2  LA Diam: 4 19 cm  LVEDV MOD A4C: 198 32 ml  LVEF MOD A4C: 54 08 %  LVESV MOD A4C: 91 07 ml  LVIDd: 5 34 cm  LVIDs: 4 08 cm  LVLd A4C: 10 15 cm  LVLs A4C: 8 98 cm  LVOT Diam: 2 43 cm  LVPWd: 1 18 cm  RA Area: 14 06 cm2  RVIDd: 3 75 cm  SV MOD A4C: 107 24 ml  SV(Teich): 64 16 ml    CW  AV Env  Ti: 414 84 ms  AV MaxP 54 mmHg  AV VTI: 103 91 cm  AV Vmax: 3 82 m/s  AV Vmean: 2 5 m/s  AV meanP 08 mmHg    MM  TAPSE: 2 13 cm    PW  LUIS (VTI): 1 06 cm2  LUIS Vmax: 1 04 cm2  E' Sept: 0 04 m/s  E/E' Sept: 22 83  LVOT Env  Ti: 482 08 ms  LVOT VTI: 23 7 cm  LVOT Vmax: 0 86 m/s  LVOT Vmean: 0 5 m/s  LVOT maxP 94 mmHg  LVOT meanP 25 mmHg  LVSV Dopp: 110 25 ml  MV A Pop: 1 23 m/s  MV Dec Oliver: 5 05 m/s2  MV DecT: 180 67 ms  MV E Pop: 0 91 m/s  MV E/A Ratio: 0 74  MV PHT: 52 39 ms  MVA By PHT: 4 2 cm2    [employer] Accredited [facility]    Prepared and electronically signed by    Bernice Child MD  Signed 06-May-2021 16:40:31    No results found for this or any previous visit  No results found for this or any previous visit  No results found for this or any previous visit

## 2022-03-02 NOTE — PATIENT INSTRUCTIONS
Recommendations:  1  Continue current medications  2  Follow up with Vascular Surgery  3  Follow up with CT surgery for TAVR  4  Recheck echo to evaluate for valvular heart disease     5  Follow up in 6 months

## 2022-03-11 ENCOUNTER — OFFICE VISIT (OUTPATIENT)
Dept: VASCULAR SURGERY | Facility: CLINIC | Age: 77
End: 2022-03-11
Payer: MEDICARE

## 2022-03-11 VITALS
DIASTOLIC BLOOD PRESSURE: 80 MMHG | SYSTOLIC BLOOD PRESSURE: 142 MMHG | BODY MASS INDEX: 24.35 KG/M2 | HEART RATE: 83 BPM | HEIGHT: 70 IN | WEIGHT: 170.1 LBS

## 2022-03-11 DIAGNOSIS — I65.23 ASYMPTOMATIC BILATERAL CAROTID ARTERY STENOSIS: ICD-10-CM

## 2022-03-11 DIAGNOSIS — I70.213 ATHEROSCLEROSIS OF NATIVE ARTERIES OF EXTREMITIES WITH INTERMITTENT CLAUDICATION, BILATERAL LEGS (HCC): Primary | ICD-10-CM

## 2022-03-11 PROCEDURE — 99213 OFFICE O/P EST LOW 20 MIN: CPT | Performed by: NURSE PRACTITIONER

## 2022-03-11 NOTE — PROGRESS NOTES
Assessment/Plan:    PAD (peripheral artery disease) ([facility] [address] )  26-year-old male with HTN, HLD, DM, CAD, CABG, AS, bilateral asymptomatic carotid stenosis, PAD status post left fem to anterior tibial bypass by Dr Celeste Morris '15, s/p  Angioplasty to bypass graft stenoses '16, recurrent bypass graft stenosis who returns to the office to review LEAD and CV duplex 1/27/22  -LEAD showed severe diffuse RLE disease, R proximal PFA/mid SFA/popliteal 50-75% stenosis, distal SFA >75% stenosis, PT occluded,  R SAMEER 0 69/74/75  and L CFA 1 8cm, L CFA <50% stenosis, L PFA >75% stenosis, SL SFA to AT bypass with a >75% distal graft stenosis and 50-75% distal anastomosis stenosis, L SAMEER 0 78/206/85   -Short distance claudication symptoms  No ischemic rest pain or ischemic tissue loss   -Mainly complaining of leg swelling, moderate BLE edema  -Repeat duplex in 6 months   -continue ASA, Plavix and statin therapy   -smoking cessation     -Counseled on the importance of proper foot care and avoiding injury to foot   -Follow up in 1 year with Dr Livia Smith to review studies     Asymptomatic bilateral carotid artery stenosis  -CV duplex showed bilateral >70+% stenosis   -Stable in comparison to prior study   -Asymptomatic from a carotid standpoint   -Continue maximum medical management with ASA/Plavix and high intensity statin  -Repeat duplex q 6 months        Diagnoses and all orders for this visit:    Atherosclerosis of native arteries of extremities with intermittent claudication, bilateral legs (HCC)  -     VAS lower limb arterial duplex, complete bilateral; Future    Asymptomatic bilateral carotid artery stenosis  -     VAS carotid complete study; Future          Subjective:      Patient ID: Daya Foote  is a 64 y o  male  Patient present to review CV done on 1/27/22  Patient denies any TIA CVA symptoms  Patient reports swelling on both feet L>R  Patient is currently ASA, Atorvastatin and Plavix    Patient is smoking about 4-5 cigarettes a day  HPI  Patient returns to the office to review carotid and lower extremity studies  He denies any significant new complaints  He is concerned about lower extremity swelling  Claudication symptoms stable  No ischemic rest pain or tissue loss  He denies focal neurological symptoms consistent with TIA/CVA  He denies amaurosis fugax  He continues to smoke and is cutting back   The following portions of the patient's history were reviewed and updated as appropriate: allergies, current medications, past family history, past medical history, past social history, past surgical history and problem list   ROS reviewed     Review of Systems   Constitutional: Negative  HENT: Negative  Eyes: Negative  Respiratory: Negative  Cardiovascular: Positive for leg swelling (BLE)  Gastrointestinal: Negative  Endocrine: Negative  Genitourinary: Negative  Musculoskeletal: Negative  Skin: Negative  Allergic/Immunologic: Negative  Neurological: Negative  Hematological: Negative  Psychiatric/Behavioral: Negative  Objective:    I have reviewed and made appropriate changes to the review of systems input by the medical assistant      Vitals:    03/11/22 1114   BP: 142/80   BP Location: Right arm   Patient Position: Sitting   Cuff Size: Adult   Pulse: 83   Weight: 77 2 kg (170 lb 1 6 oz)   Height: 5' 10" (1 778 m)       Patient Active Problem List   Diagnosis    Atherosclerosis of native arteries of extremities with intermittent claudication, bilateral legs ([facility])    PAD (peripheral artery disease) ([facility])    Stenosis of noncoronary bypass graft ([facility])    DM (diabetes mellitus) with peripheral vascular complication ([facility])    Asymptomatic bilateral carotid artery stenosis    S/P CABG (coronary artery bypass graft)    Essential hypertension    Aorto-iliac disease ([facility])    Renal artery stenosis, native, bilateral ([address] )    Dyslipidemia    CAD (coronary artery disease)    Progressive angina (HCC)    Tension headache    Cigarette nicotine dependence with nicotine-induced disorder    Calcific tendinitis of right shoulder region    Right shoulder pain    Nonrheumatic aortic valve stenosis    Sinus bradycardia    Benign hypertension with chronic kidney disease, stage III (HCC)    Controlled type 2 diabetes mellitus with stage 3 chronic kidney disease, without long-term current use of insulin (HCC)    GERD (gastroesophageal reflux disease)    Hyperlipidemia    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)    Persistent proteinuria, unspecified    Anemia due to stage 4 chronic kidney disease (HCC)    Leukopenia    Hypomagnesemia    Renal cyst, left       Past Surgical History:   Procedure Laterality Date    APPENDECTOMY      COLECTOMY      COLONOSCOPY  2013    CORONARY ARTERY BYPASS GRAFT  2013    X 2    FEMORAL ARTERY - POPLITEAL ARTERY BYPASS GRAFT      HEMORRHOID SURGERY      IR AORTAGRAM WITH RUN-OFF  11/19/2018    [place] SLCTV CATHJ 3RD+ ORD SLCTV ABDL PEL/LXTR [facility] Left 8/12/2016    Procedure: LEFT FEMORAL ARTERIOGRAM; BALLOON ANGIOPLASTY; SFA  AND FEMORAL AT VEIN GRAFT;  Surgeon: Abiodun Alexis MD;  Location[facility];  Service: Vascular    TONSILLECTOMY AND ADENOIDECTOMY         Family History   Problem Relation Age of Onset    Heart attack Father     Other Sister         bypass and vlave replacement    Stroke Paternal Uncle     Arrhythmia Neg Hx     Asthma Neg Hx     Clotting disorder Neg Hx     Fainting Neg Hx     Anuerysm Neg Hx     Hypertension Neg Hx         unsure     Hyperlipidemia Neg Hx     Heart failure Neg Hx        Social History     Socioeconomic History    Marital status:      Spouse name: Not on file    Number of children: Not on file    Years of education: Not on file    Highest education level: Not on file   Occupational History    Not on file   Tobacco Use    Smoking status: Current Every Day Smoker     Packs/day: 1 00     Years: 50 00     Pack years: 50 00     Types: Cigarettes    Smokeless tobacco: Never Used   Vaping Use    Vaping Use: Never used   Substance and Sexual Activity    Alcohol use: Yes     Comment: occasional    Drug use: No    Sexual activity: Not on file   Other Topics Concern    Not on file   Social History Narrative    · Most recent tobacco use screenin2018      · Do you currently or have you served in [employer]: Yes      · If Yes, What branch of service:   [employer]      · Occupation:   Dentists      · Exercise level:   Occasional        · Caffeine intake:   Heavy      · Marital status:         · Diet:   Regular  low fat     · Seat belts used routinely:   Yes      · Smoke alarm in home: Yes      · Advance directive:    Yes      · General stress level:   Low      Social Determinants of Health     Financial Resource Strain: Not on file   Food Insecurity: Not on file   Transportation Needs: Not on file   Physical Activity: Not on file   Stress: Not on file   Social Connections: Not on file   Intimate Partner Violence: Not on file   Housing Stability: Not on file       No Known Allergies      Current Outpatient Medications:     amLODIPine (NORVASC) 10 mg tablet, TAKE 1 TABLET DAILY, Disp: 90 tablet, Rfl: 3    AMYLASE-LIPASE-PROTEASE PO, Take by mouth , Disp: , Rfl:     aspirin (ECOTRIN LOW STRENGTH) 81 mg EC tablet, Take 81 mg by mouth daily, Disp: , Rfl:     atorvastatin (LIPITOR) 80 mg tablet, TAKE 1 TABLET DAILY AT     BEDTIME, Disp: 90 tablet, Rfl: 3    benazepril (LOTENSIN) 40 MG tablet, TAKE 1 TABLET DAILY, Disp: 90 tablet, Rfl: 3    clopidogrel (PLAVIX) 75 mg tablet, TAKE 1 TABLET DAILY, Disp: 90 tablet, Rfl: 3    Cyanocobalamin (VITAMIN B12 PO), Take by mouth once a week , Disp: , Rfl:     Fluad Quadrivalent 0 5 ML PRSY, PHARMACY ADMINISTERED, Disp: , Rfl:     hydrALAZINE (APRESOLINE) 50 mg tablet, Take 1 tablet (50 mg total) by mouth 3 (three) times a day, Disp: 270 tablet, Rfl: 3    Magnesium Oxide (MAG-200 PO), Take by mouth once a week , Disp: , Rfl:     metFORMIN (GLUCOPHAGE) 500 mg tablet, TAKE 2 TABLETS TWICE A DAY, Disp: 360 tablet, Rfl: 1    Multiple Vitamin (MULTIVITAMIN) tablet, Take 1 tablet by mouth daily  , Disp: , Rfl:     ondansetron (ZOFRAN) 4 mg tablet, Take 1 tablet (4 mg total) by mouth every 8 (eight) hours as needed for nausea or vomiting, Disp: 20 tablet, Rfl: 0    pantoprazole (PROTONIX) 40 mg tablet, Take 1 tablet (40 mg total) by mouth daily, Disp: 90 tablet, Rfl: 1    /80 (BP Location: Right arm, Patient Position: Sitting, Cuff Size: Adult)   Pulse 83   Ht 5' 10" (1 778 m)   Wt 77 2 kg (170 lb 1 6 oz)   BMI 24 41 kg/m²          Physical Exam  Vitals and nursing note reviewed  Constitutional:       Appearance: He is well-developed  HENT:      Head: Normocephalic and atraumatic  Eyes:      Extraocular Movements: Extraocular movements intact  Neck:      Vascular: Carotid bruit present  Cardiovascular:      Pulses:           Dorsalis pedis pulses are 0 on the right side and 0 on the left side  Posterior tibial pulses are 0 on the right side and 0 on the left side  Heart sounds: Murmur heard  Musculoskeletal:         General: Swelling present  Cervical back: Neck supple  Skin:     General: Skin is warm  Neurological:      Mental Status: He is alert and oriented to person, place, and time     Psychiatric:         Behavior: Behavior normal

## 2022-03-11 NOTE — PATIENT INSTRUCTIONS
Repeat carotid and lower extremity arterial studies in 6 months  Keep cutting back on the cigarettes to quit smoking completely  Walk daily for exercise  See a podiatrist for nail trimmings and foot care  Follow-up in the office in 1 year or sooner if change in symptoms  For leg swelling - calf pump exercises, frequent elevation and a limited amount of sodium

## 2022-03-13 NOTE — ASSESSMENT & PLAN NOTE
-CV duplex showed bilateral >70+% stenosis   -Stable in comparison to prior study   -Asymptomatic from a carotid standpoint   -Continue maximum medical management with ASA/Plavix and high intensity statin  -Repeat duplex q 6 months

## 2022-03-13 NOTE — ASSESSMENT & PLAN NOTE
67-year-old male with HTN, HLD, DM, CAD, CABG, AS, bilateral asymptomatic carotid stenosis, PAD status post left fem to anterior tibial bypass by Dr Car Medina '15, s/p  Angioplasty to bypass graft stenoses '16, recurrent bypass graft stenosis who returns to the office to review LEAD and CV duplex 1/27/22  -LEAD showed severe diffuse RLE disease, R proximal PFA/mid SFA/popliteal 50-75% stenosis, distal SFA >75% stenosis, PT occluded,  R SAMEER 0 69/74/75  and L CFA 1 8cm, L CFA <50% stenosis, L PFA >75% stenosis, SL SFA to AT bypass with a >75% distal graft stenosis and 50-75% distal anastomosis stenosis, L SAMEER 0 78/206/85   -Short distance claudication symptoms   No ischemic rest pain or ischemic tissue loss   -Mainly complaining of leg swelling, moderate BLE edema  -Repeat duplex in 6 months   -continue ASA, Plavix and statin therapy   -smoking cessation     -Counseled on the importance of proper foot care and avoiding injury to foot   -Follow up in 1 year with Dr Darío Coley to review studies

## 2022-03-22 ENCOUNTER — APPOINTMENT (EMERGENCY)
Dept: RADIOLOGY | Facility: HOSPITAL | Age: 77
DRG: 291 | End: 2022-03-22
Payer: MEDICARE

## 2022-03-22 ENCOUNTER — HOSPITAL ENCOUNTER (INPATIENT)
Facility: HOSPITAL | Age: 77
LOS: 3 days | Discharge: HOME | DRG: 291 | End: 2022-03-25
Attending: EMERGENCY MEDICINE | Admitting: INTERNAL MEDICINE
Payer: MEDICARE

## 2022-03-22 DIAGNOSIS — I20.0 UNSTABLE ANGINA (HCC): Primary | ICD-10-CM

## 2022-03-22 DIAGNOSIS — I35.0 SEVERE AORTIC STENOSIS: ICD-10-CM

## 2022-03-22 DIAGNOSIS — N17.9 ACUTE KIDNEY INJURY SUPERIMPOSED ON CHRONIC KIDNEY DISEASE (HCC): ICD-10-CM

## 2022-03-22 DIAGNOSIS — I50.9 ACUTE CHF (CONGESTIVE HEART FAILURE) (HCC): ICD-10-CM

## 2022-03-22 DIAGNOSIS — N18.9 ACUTE KIDNEY INJURY SUPERIMPOSED ON CHRONIC KIDNEY DISEASE (HCC): ICD-10-CM

## 2022-03-22 DIAGNOSIS — J90 BILATERAL PLEURAL EFFUSION: ICD-10-CM

## 2022-03-22 PROBLEM — F17.200 SMOKER: Status: ACTIVE | Noted: 2022-03-22

## 2022-03-22 PROBLEM — D50.9 IRON DEFICIENCY ANEMIA: Status: ACTIVE | Noted: 2022-03-22

## 2022-03-22 PROBLEM — E11.9 TYPE 2 DIABETES MELLITUS, WITHOUT LONG-TERM CURRENT USE OF INSULIN (HCC): Status: ACTIVE | Noted: 2021-06-01

## 2022-03-22 LAB
2HR DELTA HS TROPONIN: -86 NG/L
4HR DELTA HS TROPONIN: 53 NG/L
ALBUMIN SERPL BCP-MCNC: 3.4 G/DL (ref 3.5–5)
ALP SERPL-CCNC: 68 U/L (ref 46–116)
ALT SERPL W P-5'-P-CCNC: 23 U/L (ref 12–78)
ANION GAP SERPL CALCULATED.3IONS-SCNC: 12 MMOL/L (ref 4–13)
APTT PPP: 30 SECONDS (ref 23–37)
AST SERPL W P-5'-P-CCNC: 18 U/L (ref 5–45)
ATRIAL RATE: 73 BPM
ATRIAL RATE: 85 BPM
BASOPHILS # BLD AUTO: 0.02 THOUSANDS/ΜL (ref 0–0.1)
BASOPHILS NFR BLD AUTO: 0 % (ref 0–1)
BILIRUB SERPL-MCNC: 0.36 MG/DL (ref 0.2–1)
BUN SERPL-MCNC: 46 MG/DL (ref 5–25)
CALCIUM ALBUM COR SERPL-MCNC: 9.4 MG/DL (ref 8.3–10.1)
CALCIUM SERPL-MCNC: 8.9 MG/DL (ref 8.3–10.1)
CARDIAC TROPONIN I PNL SERPL HS: 588 NG/L
CARDIAC TROPONIN I PNL SERPL HS: 674 NG/L
CARDIAC TROPONIN I PNL SERPL HS: 727 NG/L
CHLORIDE SERPL-SCNC: 106 MMOL/L (ref 100–108)
CO2 SERPL-SCNC: 23 MMOL/L (ref 21–32)
CREAT SERPL-MCNC: 2.67 MG/DL (ref 0.6–1.3)
EOSINOPHIL # BLD AUTO: 0.02 THOUSAND/ΜL (ref 0–0.61)
EOSINOPHIL NFR BLD AUTO: 0 % (ref 0–6)
ERYTHROCYTE [DISTWIDTH] IN BLOOD BY AUTOMATED COUNT: 15.5 % (ref 11.6–15.1)
GFR SERPL CREATININE-BSD FRML MDRD: 21 ML/MIN/1.73SQ M
GLUCOSE SERPL-MCNC: 101 MG/DL (ref 65–140)
GLUCOSE SERPL-MCNC: 111 MG/DL (ref 65–140)
GLUCOSE SERPL-MCNC: 161 MG/DL (ref 65–140)
HCT VFR BLD AUTO: 26.8 % (ref 36.5–49.3)
HGB BLD-MCNC: 8.5 G/DL (ref 12–17)
IMM GRANULOCYTES # BLD AUTO: 0.03 THOUSAND/UL (ref 0–0.2)
IMM GRANULOCYTES NFR BLD AUTO: 1 % (ref 0–2)
INR PPP: 1.11 (ref 0.84–1.19)
LYMPHOCYTES # BLD AUTO: 0.9 THOUSANDS/ΜL (ref 0.6–4.47)
LYMPHOCYTES NFR BLD AUTO: 14 % (ref 14–44)
MCH RBC QN AUTO: 28.3 PG (ref 26.8–34.3)
MCHC RBC AUTO-ENTMCNC: 31.7 G/DL (ref 31.4–37.4)
MCV RBC AUTO: 89 FL (ref 82–98)
MONOCYTES # BLD AUTO: 0.57 THOUSAND/ΜL (ref 0.17–1.22)
MONOCYTES NFR BLD AUTO: 9 % (ref 4–12)
NEUTROPHILS # BLD AUTO: 4.78 THOUSANDS/ΜL (ref 1.85–7.62)
NEUTS SEG NFR BLD AUTO: 76 % (ref 43–75)
NRBC BLD AUTO-RTO: 0 /100 WBCS
NT-PROBNP SERPL-MCNC: ABNORMAL PG/ML
P AXIS: 75 DEGREES
P AXIS: 84 DEGREES
PLATELET # BLD AUTO: 256 THOUSANDS/UL (ref 149–390)
PMV BLD AUTO: 10.7 FL (ref 8.9–12.7)
POTASSIUM SERPL-SCNC: 5.2 MMOL/L (ref 3.5–5.3)
PR INTERVAL: 162 MS
PR INTERVAL: 176 MS
PROT SERPL-MCNC: 7.2 G/DL (ref 6.4–8.2)
PROTHROMBIN TIME: 14.3 SECONDS (ref 11.6–14.5)
QRS AXIS: 73 DEGREES
QRS AXIS: 74 DEGREES
QRSD INTERVAL: 108 MS
QRSD INTERVAL: 110 MS
QT INTERVAL: 352 MS
QT INTERVAL: 366 MS
QTC INTERVAL: 403 MS
QTC INTERVAL: 404 MS
RBC # BLD AUTO: 3 MILLION/UL (ref 3.88–5.62)
SODIUM SERPL-SCNC: 141 MMOL/L (ref 136–145)
T WAVE AXIS: 243 DEGREES
T WAVE AXIS: 260 DEGREES
VENTRICULAR RATE: 73 BPM
VENTRICULAR RATE: 79 BPM
WBC # BLD AUTO: 6.32 THOUSAND/UL (ref 4.31–10.16)

## 2022-03-22 PROCEDURE — 99291 CRITICAL CARE FIRST HOUR: CPT | Performed by: EMERGENCY MEDICINE

## 2022-03-22 PROCEDURE — 93010 ELECTROCARDIOGRAM REPORT: CPT | Performed by: INTERNAL MEDICINE

## 2022-03-22 PROCEDURE — 85025 COMPLETE CBC W/AUTO DIFF WBC: CPT | Performed by: EMERGENCY MEDICINE

## 2022-03-22 PROCEDURE — 99222 1ST HOSP IP/OBS MODERATE 55: CPT | Performed by: INTERNAL MEDICINE

## 2022-03-22 PROCEDURE — 99285 EMERGENCY DEPT VISIT HI MDM: CPT

## 2022-03-22 PROCEDURE — 84484 ASSAY OF TROPONIN QUANT: CPT | Performed by: EMERGENCY MEDICINE

## 2022-03-22 PROCEDURE — 99223 1ST HOSP IP/OBS HIGH 75: CPT | Performed by: INTERNAL MEDICINE

## 2022-03-22 PROCEDURE — 36415 COLL VENOUS BLD VENIPUNCTURE: CPT | Performed by: EMERGENCY MEDICINE

## 2022-03-22 PROCEDURE — 85610 PROTHROMBIN TIME: CPT | Performed by: EMERGENCY MEDICINE

## 2022-03-22 PROCEDURE — 93005 ELECTROCARDIOGRAM TRACING: CPT

## 2022-03-22 PROCEDURE — 82948 REAGENT STRIP/BLOOD GLUCOSE: CPT

## 2022-03-22 PROCEDURE — 84484 ASSAY OF TROPONIN QUANT: CPT

## 2022-03-22 PROCEDURE — 85730 THROMBOPLASTIN TIME PARTIAL: CPT | Performed by: EMERGENCY MEDICINE

## 2022-03-22 PROCEDURE — 71046 X-RAY EXAM CHEST 2 VIEWS: CPT

## 2022-03-22 PROCEDURE — 80053 COMPREHEN METABOLIC PANEL: CPT | Performed by: EMERGENCY MEDICINE

## 2022-03-22 PROCEDURE — 83880 ASSAY OF NATRIURETIC PEPTIDE: CPT | Performed by: EMERGENCY MEDICINE

## 2022-03-22 RX ORDER — ASPIRIN 81 MG/1
324 TABLET, CHEWABLE ORAL ONCE
Status: COMPLETED | OUTPATIENT
Start: 2022-03-22 | End: 2022-03-22

## 2022-03-22 RX ORDER — POLYETHYLENE GLYCOL 3350 17 G/17G
17 POWDER, FOR SOLUTION ORAL DAILY
Status: DISCONTINUED | OUTPATIENT
Start: 2022-03-23 | End: 2022-03-25 | Stop reason: HOSPADM

## 2022-03-22 RX ORDER — PANTOPRAZOLE SODIUM 40 MG/1
40 TABLET, DELAYED RELEASE ORAL DAILY
Status: DISCONTINUED | OUTPATIENT
Start: 2022-03-23 | End: 2022-03-25 | Stop reason: HOSPADM

## 2022-03-22 RX ORDER — HEPARIN SODIUM 1000 [USP'U]/ML
4000 INJECTION, SOLUTION INTRAVENOUS; SUBCUTANEOUS
Status: DISCONTINUED | OUTPATIENT
Start: 2022-03-22 | End: 2022-03-22

## 2022-03-22 RX ORDER — ONDANSETRON 2 MG/ML
4 INJECTION INTRAMUSCULAR; INTRAVENOUS EVERY 6 HOURS PRN
Status: DISCONTINUED | OUTPATIENT
Start: 2022-03-22 | End: 2022-03-25 | Stop reason: HOSPADM

## 2022-03-22 RX ORDER — HEPARIN SODIUM 1000 [USP'U]/ML
4000 INJECTION, SOLUTION INTRAVENOUS; SUBCUTANEOUS ONCE
Status: COMPLETED | OUTPATIENT
Start: 2022-03-22 | End: 2022-03-22

## 2022-03-22 RX ORDER — AMLODIPINE BESYLATE 10 MG/1
10 TABLET ORAL DAILY
Status: DISCONTINUED | OUTPATIENT
Start: 2022-03-23 | End: 2022-03-25 | Stop reason: HOSPADM

## 2022-03-22 RX ORDER — HEPARIN SODIUM 5000 [USP'U]/ML
5000 INJECTION, SOLUTION INTRAVENOUS; SUBCUTANEOUS EVERY 8 HOURS SCHEDULED
Status: DISCONTINUED | OUTPATIENT
Start: 2022-03-22 | End: 2022-03-25 | Stop reason: HOSPADM

## 2022-03-22 RX ORDER — ACETAMINOPHEN 325 MG/1
650 TABLET ORAL EVERY 6 HOURS PRN
Status: DISCONTINUED | OUTPATIENT
Start: 2022-03-22 | End: 2022-03-25 | Stop reason: HOSPADM

## 2022-03-22 RX ORDER — HEPARIN SODIUM 1000 [USP'U]/ML
2000 INJECTION, SOLUTION INTRAVENOUS; SUBCUTANEOUS
Status: DISCONTINUED | OUTPATIENT
Start: 2022-03-22 | End: 2022-03-22

## 2022-03-22 RX ORDER — ASPIRIN 81 MG/1
81 TABLET ORAL DAILY
Status: DISCONTINUED | OUTPATIENT
Start: 2022-03-23 | End: 2022-03-25 | Stop reason: HOSPADM

## 2022-03-22 RX ORDER — DOCUSATE SODIUM 100 MG/1
100 CAPSULE, LIQUID FILLED ORAL 2 TIMES DAILY
Status: DISCONTINUED | OUTPATIENT
Start: 2022-03-22 | End: 2022-03-25 | Stop reason: HOSPADM

## 2022-03-22 RX ORDER — FERROUS SULFATE 325(65) MG
200 TABLET ORAL EVERY OTHER DAY
COMMUNITY
End: 2022-04-01 | Stop reason: CLARIF

## 2022-03-22 RX ORDER — ATORVASTATIN CALCIUM 40 MG/1
80 TABLET, FILM COATED ORAL
Status: DISCONTINUED | OUTPATIENT
Start: 2022-03-23 | End: 2022-03-25 | Stop reason: HOSPADM

## 2022-03-22 RX ORDER — FUROSEMIDE 10 MG/ML
80 INJECTION INTRAMUSCULAR; INTRAVENOUS ONCE
Status: COMPLETED | OUTPATIENT
Start: 2022-03-22 | End: 2022-03-22

## 2022-03-22 RX ADMIN — DOCUSATE SODIUM 100 MG: 100 CAPSULE ORAL at 21:19

## 2022-03-22 RX ADMIN — HEPARIN SODIUM 4000 UNITS: 1000 INJECTION INTRAVENOUS; SUBCUTANEOUS at 16:11

## 2022-03-22 RX ADMIN — ASPIRIN 324 MG: 81 TABLET, CHEWABLE ORAL at 15:58

## 2022-03-22 RX ADMIN — IRON SUCROSE 300 MG: 20 INJECTION, SOLUTION INTRAVENOUS at 21:12

## 2022-03-22 RX ADMIN — PANCRELIPASE 24000 UNITS: 24000; 76000; 120000 CAPSULE, DELAYED RELEASE PELLETS ORAL at 18:35

## 2022-03-22 RX ADMIN — HEPARIN SODIUM 5000 UNITS: 5000 INJECTION INTRAVENOUS; SUBCUTANEOUS at 21:19

## 2022-03-22 RX ADMIN — INSULIN LISPRO 1 UNITS: 100 INJECTION, SOLUTION INTRAVENOUS; SUBCUTANEOUS at 21:19

## 2022-03-22 RX ADMIN — FUROSEMIDE 80 MG: 10 INJECTION, SOLUTION INTRAMUSCULAR; INTRAVENOUS at 16:53

## 2022-03-22 RX ADMIN — HEPARIN SODIUM 12 UNITS/KG/HR: 10000 INJECTION, SOLUTION INTRAVENOUS; SUBCUTANEOUS at 16:09

## 2022-03-22 NOTE — H&P
[facility]  H&P- [facility]  1945, 76 y o  male MRN: [id number]  Unit/Bed#: S -01 Encounter: [id number]  Primary Care Provider: Semaj Zazueta MD   Date and time admitted to hospital: 3/22/2022  1:22 PM    * Acute CHF (congestive heart failure) ([address] )  Assessment & Plan  Wt Readings from Last 3 Encounters:   03/11/22 77 2 kg (170 lb 1 6 oz)   03/02/22 77 6 kg (171 lb)   02/21/22 77 1 kg (170 lb)     On presentation - Chief complaint shortness of breath, over a week and half, worse with exertion  With associated orthopnea, cough productive of clear sputum, palpitations, and lower extremity swelling  Patient denied chest pain  Patient with extensive cardiac history including severe AS, CAD s/p CABG, severe B/L carotid disease, PAD s/p bypass, beta-blocker induced bradycardia    EKG - normal sinus rhythm, with ST depression in inferolateral leads with T-wave inversion  CXR - vascular congestion with bilateral pleural effusions  Troponin at [facility] 674, at [facility] 588 with delta -86, at [facility] with delta 53  NT proBNP 34,910  Patient's last echocardiogram was on 05/2021 which showed an EF of 60%, and a severe aortic stenosis  Etiology of patient's symptoms likely from acute heart failure secondary to severe AS disease, VS ACS/NSTEMI    While in ED, assessed Cardiology team and initially started on heparin drip due to concerns of ACS  Heparin drip discontinued, as presentation is more suggestive of acute heart failure      Plan  Telemetry monitoring  Echocardiogram  Cardiology consulted, appreciate recommendations  Receiving x1 Lasix 80 mg IV  Reassess in morning, consider Lasix 40 mg b i d   Weights daily and I/O's  Diet cardiovascular 2 g sodium  Continue home ASA and statin  Holding home benazepril and hydralazine in the setting of SHOAIB    Nonrheumatic aortic valve stenosis  Assessment & Plan  Per chart review, documentation suggests 1st noted in 2015 on echocardiogram  Aortic valve stenosis noted as severe on 05/2021  Patient follows up with outpatient Cardiology Dr Adelina Arnold  At last follow-up with Cardiology, patient considering TAVR and was to follow-up with CT surgery  Plan  As above in "acute CHF"  Continue outpatient follow-up with Cardiology    Acute kidney injury superimposed on chronic kidney disease [facility])  Assessment & Plan  Lab Results   Component Value Date    EGFR 21 03/22/2022    EGFR 35 01/21/2022    EGFR 34 12/30/2021    CREATININE 2 67 (H) 03/22/2022    CREATININE 1 77 (H) 01/21/2022    CREATININE 1 83 (H) 12/30/2021   POA, 2 67  Baseline is 1 6-1 8  Likely in the setting of volume overload/CHF most likely, use of nephrotoxic meds, less likely poor oral intake of fluids  Patient follows up with outpatient Nephrology Dr Blane Romero      Plan  Diuresis - received Lasix 80 mg IV once  Consider further tomorrow diuresis with Lasix 40 mg b i d     Avoid nephrotoxins - holding home benazepril, hydralazine  Avoid hypotension  Monitor I/O's, daily weights    Smoker  Assessment & Plan  Patient reports smoking 45 cigarettes per day  Has been attempting to cut down on smoking  Plan  Counseled on smoking cessation    Iron deficiency anemia  Assessment & Plan  Iron panel from 01/2022 showed low iron and low iron saturation  POA, hemoglobin 8 5  Prior to admission most recent hemoglobin was 9 7  No signs of bleeding  Plan  IV Venofer x3 doses  A m  CBC    Type 2 diabetes mellitus, without long-term current use of insulin (HCC)  Assessment & Plan  Lab Results   Component Value Date    HGBA1C 6 0 (H) 09/24/2021     No results for input(s): POCGLU in the last 72 hours  Blood Sugar Average: Last 72 hrs:    Home meds include metformin    Plan  Hemoglobin A1c  Holding home oral hypoglycemic med  SSI and POCT a c   And HS  Hypoglycemia protocol  Diabetic diet    Hypertension  Assessment & Plan  Home meds include amlodipine 10 mg daily, benazepril 40 mg daily, and hydralazine 50 mg t i d   Patient not on diuretics at home  Plan  Hold home benazepril, hydralazine in the setting of SHOAIB  Continue amlodipine 10 mg daily  Monitor BP closely    VTE Prophylaxis: Heparin  / sequential compression device   Code Status:  Level 1  POLST: POLST form is not discussed and not completed at this time  Anticipated Length of Stay:  Patient will be admitted on an Inpatient basis with an anticipated length of stay of  > 2 midnights  Justification for Hospital Stay:  Acute CHF    Chief Complaint:   Shortness of breath    History of Present Illness:    Emiliano Donaldson  is a 76 y o  male with PMH of severe AS, CAD s/p CABG, severe B/L carotid disease, PAD s/p bypass, beta-blocker induced bradycardia, type 2 DM, HTN, and CKD 3B, who presents with shortness of breath  Patient reports that has been happening for over a week and half, and worse with exertion  Patient requires to stop and rest to catch breath  Patient also reports associated orthopnea, cough productive of clear sputum, palpitations, and lower extremity swelling  Patient denied chest pain, lightheadedness, and numbness  Patient follows up with outpatient Cardiology    While in ED, patient assessed by Cardiology team and initially started on heparin drip due to concerns of ACS  Patient's EKG showed normal sinus rhythm, with ST depression in inferolateral leads with T-wave inversion  Patient with elevated troponin which then trended down slightly and a NT proBNP 34,910  Patient's CXR showed vascular congestion with bilateral pleural effusions  Patient's last echocardiogram was on 05/2021 which showed an EF of 60%, and a severe aortic stenosis  On evaluation, patient found on 2 L NC O2 and his baseline is room air  On exam, + JVD, bibasilar crackles, systolic murmur, and 2+ pitting edema bilaterally at lower extremities    Due to etiology of patient's symptoms most likely from acute congestive heart failure secondary to severe AS disease, patient was discontinued from heparin drip  Patient will be admitted to [place] [id number] for management and further assessment of acute CHF  Review of Systems:    Review of Systems   Constitutional: Positive for activity change  Negative for chills, diaphoresis, fatigue and fever  HENT: Negative for ear pain and sore throat  Eyes: Negative for pain and visual disturbance  Respiratory: Positive for cough (Productive of clear sputum) and shortness of breath ( worse with exertion)  Negative for chest tightness, wheezing and stridor  Orthopnea   Cardiovascular: Positive for palpitations and leg swelling  Negative for chest pain  Gastrointestinal: Negative for abdominal pain and vomiting  Genitourinary: Negative for difficulty urinating, dysuria, frequency and hematuria  Musculoskeletal: Negative for arthralgias and back pain  Skin: Negative for color change and rash  Neurological: Negative for seizures, syncope, weakness, light-headedness, numbness and headaches  Psychiatric/Behavioral: Negative for confusion  All other systems reviewed and are negative        Past Medical and Surgical History:     Past Medical History:   Diagnosis Date    Arteriosclerosis of arteries of extremities [facility] )     CAD (coronary artery disease)     Chronic kidney disease     Diabetes ([facility] )     Dyslipidemia     Endocrine pancreas disorder     GERD (gastroesophageal reflux disease)     Hyperlipidemia     Hypertension     Kidney stones     PAD (peripheral artery disease) ([facility] )        Past Surgical History:   Procedure Laterality Date    APPENDECTOMY      COLECTOMY      COLONOSCOPY  2013    CORONARY ARTERY BYPASS GRAFT  2013    X 2    FEMORAL ARTERY - POPLITEAL ARTERY BYPASS GRAFT      HEMORRHOID SURGERY      IR AORTAGRAM WITH RUN-OFF  11/19/2018    ND SLCTV CATHJ 3RD+ ORD SLCTV ABDL PEL/LXTR [facility] Left 8/12/2016    Procedure: LEFT FEMORAL ARTERIOGRAM; BALLOON ANGIOPLASTY; SFA  AND FEMORAL AT VEIN GRAFT;  Surgeon: Yousif Morales MD;  Location: [facility];  Service: Vascular    TONSILLECTOMY AND ADENOIDECTOMY         Meds/Allergies:    Prior to Admission medications    Medication Sig Start Date End Date Taking? Authorizing Provider   amLODIPine (NORVASC) 10 mg tablet TAKE 1 TABLET DAILY 12/13/21  Yes Beltran Moseley MD   AMYLASE-LIPASE-PROTEASE PO Take by mouth    Yes Historical Provider, MD   aspirin (ECOTRIN LOW STRENGTH) 81 mg EC tablet Take 81 mg by mouth daily   Yes Historical Provider, MD   atorvastatin (LIPITOR) 80 mg tablet TAKE 1 TABLET DAILY AT     BEDTIME 2/23/22  Yes Beltran Moseley MD   benazepril (LOTENSIN) 40 MG tablet TAKE 1 TABLET DAILY 12/13/21  Yes Beltran Moseley MD   clopidogrel (PLAVIX) 75 mg tablet TAKE 1 TABLET DAILY 11/16/21  Yes Beltran Moseley MD   Cyanocobalamin (VITAMIN B12 PO) Take by mouth once a week    Yes Historical Provider, MD   Ferrous Sulfate Dried (Feosol) 200 (65 Fe) MG TABS Take 200 mg by mouth every other day   Yes Historical Provider, MD   hydrALAZINE (APRESOLINE) 50 mg tablet Take 1 tablet (50 mg total) by mouth 3 (three) times a day 1/10/22  Yes Dia Mckeon MD   Magnesium Oxide (MAG-200 PO) Take by mouth once a week    Yes Historical Provider, MD   metFORMIN (GLUCOPHAGE) 500 mg tablet TAKE 2 TABLETS TWICE A DAY 12/13/21  Yes Jessika Szymanski MD   Multiple Vitamin (MULTIVITAMIN) tablet Take 1 tablet by mouth daily     Yes Historical Provider, MD   pantoprazole (PROTONIX) 40 mg tablet Take 1 tablet (40 mg total) by mouth daily 12/27/21  Yes Jessica Matt PA-C   Fluad Quadrivalent 0 5 ML [facility] ADMINISTERED 10/6/20   Historical Provider, MD   ondansetron (ZOFRAN) 4 mg tablet Take 1 tablet (4 mg total) by mouth every 8 (eight) hours as needed for nausea or vomiting  Patient not taking: Reported on 3/22/2022  12/13/21 3/22/22  Jacqui Lunsford MD     I have reviewed home medications with patient personally  Allergies: No Known Allergies    Social History:     Marital Status:    Occupation:  Retired  Patient Pre-hospital Living Situation:  Home  Patient Pre-hospital Level of Mobility:  Complete  Patient Pre-hospital Diet Restrictions:  None  Substance Use History:   Social History     Substance and Sexual Activity   Alcohol Use Yes    Comment: occasional     Social History     Tobacco Use   Smoking Status Current Every Day Smoker    Packs/day: 1 00    Years: 50 00    Pack years: 50 00    Types: Cigarettes   Smokeless Tobacco Never Used     Social History     Substance and Sexual Activity   Drug Use No       Family History:    Family History   Problem Relation Age of Onset    Heart attack Father     Other Sister         bypass and vlave replacement    Stroke Paternal Uncle     Arrhythmia Neg Hx     Asthma Neg Hx     Clotting disorder Neg Hx     Fainting Neg Hx     Anuerysm Neg Hx     Hypertension Neg Hx         unsure     Hyperlipidemia Neg Hx     Heart failure Neg Hx        Physical Exam:     Vitals:   Blood Pressure: 141/70 (03/22/22 1940)  Pulse: 78 (03/22/22 1940)  Temperature: 97 8 °F (36 6 °C) (03/22/22 1940)  Temp Source: Oral (03/22/22 1940)  Respirations: 19 (03/22/22 1940)  Weight - Scale: 79 9 kg (176 lb 3 2 oz) (03/22/22 1940)  SpO2: 90 % (03/22/22 1940)    Physical Exam  Vitals and nursing note reviewed  Constitutional:       General: He is not in acute distress  Appearance: Normal appearance  He is not ill-appearing or diaphoretic  HENT:      Head: Normocephalic and atraumatic  Mouth/Throat:      Mouth: Mucous membranes are moist       Pharynx: Oropharynx is clear  Eyes:      General: No scleral icterus  Conjunctiva/sclera: Conjunctivae normal    Neck:      Vascular: JVD present  Cardiovascular:      Rate and Rhythm: Normal rate and regular rhythm  Pulses: Normal pulses  Heart sounds: Murmur heard  Systolic murmur is present    No gallop  Pulmonary:      Effort: Pulmonary effort is normal  No respiratory distress  Breath sounds: Rales (Bibasilar) present  No wheezing  Abdominal:      General: Bowel sounds are normal  There is no distension  Palpations: Abdomen is soft  Tenderness: There is no abdominal tenderness  Musculoskeletal:         General: No tenderness  Normal range of motion  Cervical back: Normal range of motion and neck supple  No tenderness  Right lower le+ Pitting Edema present  Left lower le+ Pitting Edema present  Lymphadenopathy:      Cervical: No cervical adenopathy  Skin:     General: Skin is warm and dry  Capillary Refill: Capillary refill takes less than 2 seconds  Coloration: Skin is not jaundiced or pale  Neurological:      General: No focal deficit present  Mental Status: He is alert and oriented to person, place, and time  Mental status is at baseline  Motor: No weakness  Psychiatric:         Mood and Affect: Mood normal          Behavior: Behavior normal          Thought Content: Thought content normal          Judgment: Judgment normal          Additional Data:     Lab Results: I have personally reviewed pertinent reports  Results from last 7 days   Lab Units 22  1347   WBC Thousand/uL 6 32   HEMOGLOBIN g/dL 8 5*   HEMATOCRIT % 26 8*   PLATELETS Thousands/uL 256   NEUTROS PCT % 76*   LYMPHS PCT % 14   MONOS PCT % 9   EOS PCT % 0     Results from last 7 days   Lab Units 22  1347   POTASSIUM mmol/L 5 2   CHLORIDE mmol/L 106   CO2 mmol/L 23   BUN mg/dL 46*   CREATININE mg/dL 2 67*   CALCIUM mg/dL 8 9   ALK PHOS U/L 68   ALT U/L 23   AST U/L 18     Results from last 7 days   Lab Units 22  1347   INR  1 11       Imaging: I have personally reviewed pertinent reports  No results found      EKG, Pathology, and Other Studies Reviewed on Admission:   · EKG:  NSR, ST depression at inferolateral leads with T-wave inversion    [facility] / Care Everywhere Records Reviewed: Yes     ** Please Note: This note has been constructed using a voice recognition system   **

## 2022-03-22 NOTE — ED PROVIDER NOTES
History  Chief Complaint   Patient presents with    Shortness of Breath     pt c/o SOB (especially DEMARCO), pt needs a taver for aortic stenosis but never got it done       History provided by:  Patient  Shortness of Breath  Severity:  Severe  Onset quality:  Gradual  Duration:  3 days  Timing:  Constant  Progression:  Worsening  Chronicity:  New  Context comment:  History of severe aortic stenosis, worsening shortness of breath over the past 3 days, now with even minimal exertion  Relieved by:  None tried  Worsened by: Activity  Ineffective treatments:  None tried  Associated symptoms: no abdominal pain, no chest pain, no cough, no diaphoresis, no fever, no headaches, no neck pain, no rash, no sore throat, no vomiting and no wheezing        Prior to Admission Medications   Prescriptions Last Dose Informant Patient Reported? Taking? AMYLASE-LIPASE-PROTEASE PO 3/22/2022 at Unknown time Self Yes Yes   Sig: Take by mouth    Cyanocobalamin (VITAMIN B12 PO) 3/21/2022 at Unknown time Self Yes Yes   Sig: Take by mouth once a week    Ferrous Sulfate Dried (Feosol) 200 (65 Fe) MG TABS   Yes Yes   Sig: Take 200 mg by mouth every other day   Fluad Quadrivalent 0 5 ML PRSY  Self Yes No   Sig: PHARMACY ADMINISTERED   Magnesium Oxide (MAG-200 PO) 3/21/2022 at Unknown time Self Yes Yes   Sig: Take by mouth once a week    Multiple Vitamin (MULTIVITAMIN) tablet 3/22/2022 at Unknown time Self Yes Yes   Sig: Take 1 tablet by mouth daily     amLODIPine (NORVASC) 10 mg tablet 3/22/2022 at Unknown time Self No Yes   Sig: TAKE 1 TABLET DAILY   aspirin (ECOTRIN LOW STRENGTH) 81 mg EC tablet 3/22/2022 at Unknown time Self Yes Yes   Sig: Take 81 mg by mouth daily   atorvastatin (LIPITOR) 80 mg tablet 3/22/2022 at Unknown time Self No Yes   Sig: TAKE 1 TABLET DAILY AT     BEDTIME   benazepril (LOTENSIN) 40 MG tablet 3/22/2022 at Unknown time Self No Yes   Sig: TAKE 1 TABLET DAILY   clopidogrel (PLAVIX) 75 mg tablet 3/22/2022 at Unknown time Self No Yes   Sig: TAKE 1 TABLET DAILY   hydrALAZINE (APRESOLINE) 50 mg tablet 3/22/2022 at Unknown time Self No Yes   Sig: Take 1 tablet (50 mg total) by mouth 3 (three) times a day   metFORMIN (GLUCOPHAGE) 500 mg tablet 3/22/2022 at Unknown time Self No Yes   Sig: TAKE 2 TABLETS TWICE A DAY   pantoprazole (PROTONIX) 40 mg tablet 3/22/2022 at Unknown time Self No Yes   Sig: Take 1 tablet (40 mg total) by mouth daily      Facility-Administered Medications: None       Past Medical History:   Diagnosis Date    Arteriosclerosis of arteries of extremities (HCC)     CAD (coronary artery disease)     Chronic kidney disease     Diabetes ([facility] [address] )     Dyslipidemia     Endocrine pancreas disorder     GERD (gastroesophageal reflux disease)     Hyperlipidemia     Hypertension     Kidney stones     [facility]        Past Surgical History:   Procedure Laterality Date    APPENDECTOMY      COLECTOMY      COLONOSCOPY  2013    CORONARY ARTERY BYPASS GRAFT  2013    X 2    FEMORAL ARTERY - POPLITEAL ARTERY BYPASS GRAFT      HEMORRHOID SURGERY      IR AORTAGRAM WITH RUN-OFF  11/19/2018    [place] SLCTV CATHJ 3RD+ ORD SLCTV ABDL PEL/LXTR [facility] Left 8/12/2016    Procedure: LEFT FEMORAL ARTERIOGRAM; BALLOON ANGIOPLASTY; SFA  AND FEMORAL AT VEIN GRAFT;  Surgeon: Dayna Pierce MD;  [facility]: [facility];  Service: Vascular    TONSILLECTOMY AND ADENOIDECTOMY         Family History   Problem Relation Age of Onset    Heart attack Father     Other Sister         bypass and vlave replacement    Stroke Paternal Uncle     Arrhythmia Neg Hx     Asthma Neg Hx     Clotting disorder Neg Hx     Fainting Neg Hx     Anuerysm Neg Hx     Hypertension Neg Hx         unsure     Hyperlipidemia Neg Hx     Heart failure Neg Hx      I have reviewed and agree with the history as documented      E-Cigarette/Vaping    E-Cigarette Use Never User      E-Cigarette/Vaping Substances    Nicotine No     THC No     CBD No  Flavoring No     Other No     Unknown No      Social History     Tobacco Use    Smoking status: Current Every Day Smoker     Packs/day: 1 00     Years: 50 00     Pack years: 50 00     Types: Cigarettes    Smokeless tobacco: Never Used   Vaping Use    Vaping Use: Never used   Substance Use Topics    Alcohol use: Yes     Comment: occasional    Drug use: No       Review of Systems   Constitutional: Negative for activity change, chills, diaphoresis and fever  HENT: Negative for congestion, sinus pressure and sore throat  Eyes: Negative for pain and visual disturbance  Respiratory: Positive for shortness of breath  Negative for cough, chest tightness, wheezing and stridor  Cardiovascular: Negative for chest pain and palpitations  Gastrointestinal: Negative for abdominal distention, abdominal pain, constipation, diarrhea, nausea and vomiting  Genitourinary: Negative for dysuria and frequency  Musculoskeletal: Negative for neck pain and neck stiffness  Skin: Negative for rash  Neurological: Negative for dizziness, speech difficulty, light-headedness, numbness and headaches  Physical Exam  Physical Exam  Vitals reviewed  Constitutional:       General: He is not in acute distress  Appearance: He is well-developed  He is not diaphoretic  HENT:      Head: Normocephalic and atraumatic  Right Ear: External ear normal       Left Ear: External ear normal       Nose: Nose normal    Eyes:      General:         Right eye: No discharge  Left eye: No discharge  Pupils: Pupils are equal, round, and reactive to light  Neck:      Trachea: No tracheal deviation  Cardiovascular:      Rate and Rhythm: Normal rate and regular rhythm  Heart sounds: Murmur heard  Pulmonary:      Effort: Pulmonary effort is normal  No respiratory distress  Breath sounds: Normal breath sounds  No stridor  Abdominal:      General: There is no distension        Palpations: Abdomen is soft  Tenderness: There is no abdominal tenderness  There is no guarding or rebound  Musculoskeletal:         General: Normal range of motion  Cervical back: Normal range of motion and neck supple  Skin:     General: Skin is warm and dry  Coloration: Skin is not pale  Findings: No erythema  Neurological:      General: No focal deficit present  Mental Status: He is alert and oriented to person, place, and time           Vital Signs  ED Triage Vitals [03/22/22 1207]   Temperature Pulse Respirations Blood Pressure SpO2   97 6 °F (36 4 °C) 86 20 115/51 93 %      Temp Source Heart Rate Source Patient Position - Orthostatic VS BP Location FiO2 (%)   Oral Monitor Sitting Left arm --      Pain Score       --           Vitals:    03/22/22 1207 03/22/22 1530 03/22/22 1600 03/22/22 1700   BP: 115/51  139/63 154/70   Pulse: 86 78 78 76   Patient Position - Orthostatic VS: Sitting            Visual Acuity      ED Medications  Medications   heparin (porcine) 25,000 units in 0 45% in sodium chloride 250 ml infusion (CMPD) (12 Units/kg/hr × 75 kg (Order-Specific) Intravenous New Bag 3/22/22 1609)   heparin (porcine) injection 4,000 Units (has no administration in time range)   heparin (porcine) injection 2,000 Units (has no administration in time range)   pancrelipase (Lip-Prot-Amyl) (CREON) delayed release capsule 24,000 Units (has no administration in time range)   aspirin chewable tablet 324 mg (324 mg Oral Given 3/22/22 1558)   heparin (porcine) injection 4,000 Units (4,000 Units Intravenous Given 3/22/22 1611)   furosemide (LASIX) injection 80 mg (80 mg Intravenous Given 3/22/22 1653)       Diagnostic Studies  Results Reviewed     Procedure Component Value Units Date/Time    HS Troponin I 2hr [[id number]]  (Abnormal) Collected: 03/22/22 1605    Lab Status: Final result Specimen: Blood from Arm, Left Updated: 03/22/22 1642     hs TnI 2hr 588 ng/L      Delta 2hr hsTnI -86 ng/L     HS Troponin I 4hr [[id number]]     Lab Status: No result Specimen: Blood     NT-BNP PRO [[id number]]  (Abnormal) Collected: 03/22/22 1347    Lab Status: Final result Specimen: Blood from Arm, Left Updated: 03/22/22 1527     NT-proBNP 34,910 pg/mL     HS Troponin 0hr (reflex protocol) [[id number]]  (Abnormal) Collected: 03/22/22 1347    Lab Status: Final result Specimen: Blood from Arm, Left Updated: 03/22/22 1425     hs TnI 0hr 674 ng/L     Comprehensive metabolic panel [[id number]]  (Abnormal) Collected: 03/22/22 1347    Lab Status: Final result Specimen: Blood from Arm, Left Updated: 03/22/22 1419     Sodium 141 mmol/L      Potassium 5 2 mmol/L      Chloride 106 mmol/L      CO2 23 mmol/L      ANION GAP 12 mmol/L      BUN 46 mg/dL      Creatinine 2 67 mg/dL      Glucose 101 mg/dL      Calcium 8 9 mg/dL      Corrected Calcium 9 4 mg/dL      AST 18 U/L      ALT 23 U/L      Alkaline Phosphatase 68 U/L      Total Protein 7 2 g/dL      Albumin 3 4 g/dL      Total Bilirubin 0 36 mg/dL      eGFR 21 ml/min/1 73sq m     Narrative:      [place] guidelines for Chronic Kidney Disease (CKD):     Stage 1 with normal or high GFR (GFR > 90 mL/min/1 73 square meters)    Stage 2 Mild CKD (GFR = 60-89 mL/min/1 73 square meters)    Stage 3A Moderate CKD (GFR = 45-59 mL/min/1 73 square meters)    Stage 3B Moderate CKD (GFR = 30-44 mL/min/1 73 square meters)    Stage 4 Severe CKD (GFR = 15-29 mL/min/1 73 square meters)    Stage 5 End Stage CKD (GFR <15 mL/min/1 73 square meters)  Note: GFR calculation is accurate only with a steady state creatinine    Protime-INR [[id number]]  (Normal) Collected: 03/22/22 1347    Lab Status: Final result Specimen: Blood from Arm, Left Updated: 03/22/22 1414     Protime 14 3 seconds      INR 1 11    APTT [[id number]]  (Normal) Collected: 03/22/22 1347    Lab Status: Final result Specimen: Blood from Arm, Left Updated: 03/22/22 1414     PTT 30 seconds     CBC and differential [[id number]] (Abnormal) Collected: 03/22/22 1347    Lab Status: Final result Specimen: Blood from Arm, Left Updated: 03/22/22 1410     WBC 6 32 Thousand/uL      RBC 3 00 Million/uL      Hemoglobin 8 5 g/dL      Hematocrit 26 8 %      MCV 89 fL      MCH 28 3 pg      MCHC 31 7 g/dL      RDW 15 5 %      MPV 10 7 fL      Platelets 072 Thousands/uL      nRBC 0 /100 WBCs      Neutrophils Relative 76 %      Immat GRANS % 1 %      Lymphocytes Relative 14 %      Monocytes Relative 9 %      Eosinophils Relative 0 %      Basophils Relative 0 %      Neutrophils Absolute 4 78 Thousands/µL      Immature Grans Absolute 0 03 Thousand/uL      Lymphocytes Absolute 0 90 Thousands/µL      Monocytes Absolute 0 57 Thousand/µL      Eosinophils Absolute 0 02 Thousand/µL      Basophils Absolute 0 02 Thousands/µL                  XR chest 2 views   ED Interpretation by Denny Rm DO (03/22 1511)   Bilateral pleural effusions with bibasilar atelectasis                 Procedures  ECG 12 Lead Documentation Only    Date/Time: 3/22/2022 1:51 PM  Performed by: Denny Rm DO  Authorized by: Denny Rm DO     ECG reviewed by me, the ED Provider: yes    Patient location:  ED  Interpretation:     Interpretation: non-specific    Rhythm:     Rhythm: sinus rhythm    Ectopy:     Ectopy: none    QRS:     QRS axis:  Normal    QRS intervals:  Normal  Conduction:     Conduction: normal    ST segments:     ST segments:  Non-specific  T waves:     T waves: non-specific, flattening and inverted    Comments:      Inferolateral ST depression and T-wave inversion, new from previous EKG from October 27th, 2015    CriticalCare Time  Performed by: Denny Rm DO  Authorized by: Denny Rm DO     Critical care provider statement:     Critical care time (minutes):  40    Critical care time was exclusive of:  Separately billable procedures and treating other patients    Critical care was necessary to treat or prevent imminent or life-threatening deterioration of the following conditions:  Cardiac failure    Critical care was time spent personally by me on the following activities:  Obtaining history from patient or surrogate, development of treatment plan with patient or surrogate, discussions with consultants, evaluation of patient's response to treatment, examination of patient, ordering and performing treatments and interventions, ordering and review of laboratory studies, ordering and review of radiographic studies, re-evaluation of patient's condition and review of old charts             ED Course                                             MDM  Number of Diagnoses or Management Options  Bilateral pleural effusion: new and requires workup  Severe aortic stenosis: new and requires workup  Unstable angina [facility]): new and requires workup  Diagnosis management comments:       Initial ED assessment:    75-year-old male presents with dyspnea on exertion, history of severe aortic stenosis    Initial DDx includes but is not limited to:   Now critical care aortic stenosis, ACS/unstable angina, Congestive heart failure, COPD, electrolyte abnormality, anemia    Initial ED plan:   Blood work, chest x-ray, disposition pending ED workup        Final ED summary/disposition:   After evaluation and workup in the emergency department, admitted to hospital further workup evaluation of unstable angina versus severe/critical aortic stenosis        Amount and/or Complexity of Data Reviewed  Clinical lab tests: ordered and reviewed  Tests in the radiology section of CPT®: ordered and reviewed  Decide to obtain previous medical records or to obtain history from someone other than the patient: yes  Obtain history from someone other than the patient: yes  Review and summarize past medical records: yes  Discuss the patient with other providers: yes  Independent visualization of images, tracings, or specimens: yes        Disposition  Final diagnoses:   Unstable angina ([address] ) Severe aortic stenosis   Bilateral pleural effusion     Time reflects when diagnosis was documented in both MDM as applicable and the Disposition within this note     Time User Action Codes Description Comment    3/22/2022  3:09 PM Ardia Reus Add [I20 0] Unstable angina ([address] )     3/22/2022  3:09 PM Ardia Reus Add [I35 0] Severe aortic stenosis     3/22/2022  3:11 PM Ardia Reus Add [J90] Bilateral pleural effusion       ED Disposition     ED Disposition Condition Date/Time Comment    Admit Stable Tue Mar 22, 2022  3:08 PM Case was discussed with Dr Ellen Perez and the patient's admission status was agreed to be Admission Status: inpatient status to the service of Dr Ellen Perez   Follow-up Information    None         Patient's Medications   Discharge Prescriptions    No medications on file       No discharge procedures on file      PDMP Review     None          ED Provider  Electronically Signed by           Noni Mosher DO  03/22/22 3063

## 2022-03-22 NOTE — ASSESSMENT & PLAN NOTE
Per chart review, documentation suggests 1st noted in 2015 on echocardiogram   Aortic valve stenosis noted as severe on 05/2021  Patient follows up with outpatient Cardiology Dr Pierce Mendoza  At last follow-up with Cardiology, patient considering TAVR and was to follow-up with CT surgery      Plan  As above in "acute CHF"  Continue outpatient follow-up with Cardiology

## 2022-03-22 NOTE — ASSESSMENT & PLAN NOTE
Iron panel from 01/2022 showed low iron and low iron saturation  POA, hemoglobin 8 5  Prior to admission most recent hemoglobin was 9 7  No signs of bleeding  Plan  IV Venofer x3 doses  A m   CBC

## 2022-03-22 NOTE — ASSESSMENT & PLAN NOTE
Lab Results   Component Value Date    EGFR 21 03/22/2022    EGFR 35 01/21/2022    EGFR 34 12/30/2021    CREATININE 2 67 (H) 03/22/2022    CREATININE 1 77 (H) 01/21/2022    CREATININE 1 83 (H) 12/30/2021   POA, 2 67  Baseline is 1 6-1 8  Likely in the setting of volume overload/CHF most likely, use of nephrotoxic meds, less likely poor oral intake of fluids  Patient follows up with outpatient Nephrology Dr Leta Oscar      Plan  Diuresis - received Lasix 80 mg IV once  Consider further tomorrow diuresis with Lasix 40 mg b i d     Avoid nephrotoxins - holding home benazepril, hydralazine  Avoid hypotension  Monitor I/O's, daily weights

## 2022-03-22 NOTE — LETTER
Thank you for allowing us to participate in the care of your patient, Bozena Michael , who was hospitalized from 03/22/2022 through 3/26/2022 with the admitting diagnosis of acute CHF  Echocardiogram showing LVEF 50% and severe aortic stenosis as noted in the past, which is most likely cause of acute CHF  Patient monitored by Cardiology  Patient managed with intravenous Lasix 80 mg twice a day  Initially requiring 2 L of oxygen and then wean down to room air  Patient discharged on torsemide 60 mg daily  Patient will follow-up with outpatient Cardiology  If you have any additional questions or would like to discuss further, please feel free to contact me      Tee Martinze MD  [facility], Hospitalist  [phone]

## 2022-03-22 NOTE — ASSESSMENT & PLAN NOTE
Lab Results   Component Value Date    HGBA1C 6 0 (H) 09/24/2021     No results for input(s): POCGLU in the last 72 hours  Blood Sugar Average: Last 72 hrs:    Home meds include metformin    Plan  Hemoglobin A1c  Holding home oral hypoglycemic med  SSI and POCT a c   And HS  Hypoglycemia protocol  Diabetic diet

## 2022-03-22 NOTE — ASSESSMENT & PLAN NOTE
Home meds include amlodipine 10 mg daily, benazepril 40 mg daily, and hydralazine 50 mg t i d   Patient not on diuretics at home      Plan  Hold home benazepril, hydralazine in the setting of SHOAIB  Continue amlodipine 10 mg daily  Monitor BP closely

## 2022-03-22 NOTE — CONSULTS
Consultation - Cardiology Team One  Aniya Terry  76 y o  male MRN: [id number]  Unit/Bed#: ED 24 Encounter: [id number]    Consult to cardiology  Consult performed by: ZHANNA Alonzo  Consult ordered by: Darnell Sanchez DO      Physician Requesting Consult: Darnell Sanchez DO  Reason for Consult / Principal Problem: aortic stenosis, CHF, elevated troponin    Assessment  1  Elevated troponin-suspect nonischemic myocardial injury in the setting of acute heart failure and aortic valve stenosis- 1st troponin 674  ECG shows NSR with PACs and nonspecific ST/T-wave abnormality  No complaints of chest pain but does note dyspnea on exertion likely in setting of AS  2  Acute diastolic CHF- CXR with vascular congestion and small right effusion, on 2 L nasal cannula, NT proBNP 70496  2+ lower extremity edema and rales on exam   3  Severe AS- TTE May 2021-LVEF 60% with severe AS with LUIS 0 91 cm2, peak gradient 63 mmHg and mean gradient 32 mmHg  4  Acute hypoxic respiratory failure- does not wear home O2, currently on 2 L nasal cannula  5  SHOAIB on CKD- creatinine 2 67 likely in setting of acute CHF/volume overload  Baseline 1 6-1 8; recently established with Dr Cuong Clark   6  CAD s/p CABG x2 September 2013  7  Hypertension- /51  8  Dyslipidemia- lipid panel from June 2021-cholesterol 85, triglycerides 90, HDL 31, LDL 36  On atorvastatin 80 mg daily  9  Bilateral carotid stenosis, PAD s/p LLE revascularization  10  Chronic anemia- hgb 8 5  11  Tobacco abuse-smoking 4-5 cigarettes per day, trying to cut back  Plan  2 hour troponin pending, if unchanged from 1st troponin then recommend stopping IV heparin as this is likely elevated in the setting of his heart failure and known severe AS  Appears significantly overloaded on exam-Give 80 mg IV Lasix x1 and monitor response    Check updated echocardiogram, suspect he may have drop in his LV function secondary to his aortic stenosis  Close monitoring of I's and O's, daily standing weights  A m  BMP with low threshold to consult Nephrology if creatinine does not improve with IV diuretics  BP well controlled, hold benazepril and hydralazine  Continue amlodipine  Has history of bradycardia on beta-blockers so would avoid those at this time  Continue aspirin and statin  Encouraged smoking cessation    History of Present Illness   HPI: Misha Queen  is a 76y o  year old male with the s/p CABG x2 September 2013, hypertension, with severe aortic stenosis, dyslipidemia, PAD s/p left lower extremity revascularization, severe bilateral carotid disease followed by vascular surgery, sinus bradycardia improved off beta blockers, tobacco abuse, chronic anemia and CKD  He follows with cardiologist Dr Jina Melchor and was last seen in the office on 03/02/2022  No medications were changed at this appointment but he was encouraged to follow-up with Cardiothoracic surgery for TAVR evaluation  He did schedule an appointment with Dr Bryson Kessler on 04/22/2022  He was initially evaluated for TAVR back in September 2020 by Dr Bryson Kessler  At that time, he had decided to pursue evaluation of other neurologic complaints prior to considering TAVR  He has not been seen by Cardiothoracic surgery since then  He presented to the ED today with complaints of shortness of breath, most especially on exertion for the past 1-2 weeks  He also notes lower extremity swelling for several weeks, orthopnea, cough, and dizziness especially when standing  He denies any chest pain and actually admits to recent weight loss  He is eating and drinking normally but does note that he did not make much urine yesterday despite what he feels was adequate hydration  ECG showed NSR with nonspecific ST/T-wave abnormality and a troponin of 674  He was given 325 mg aspirin and started on IV heparin  /51    Cardiology consulted for further evaluation of his chest pain and shortness of breath with exertion  Other labs reveal SHOAIB with creatinine 2 67 from baseline of 1 7-1 8, hemoglobin 8 5  EKG reviewed personally: NSR with PACs, nonspecific ST/T wave abnormality    Telemetry reviewed personally: NSR    Review of Systems   Constitutional: Positive for weight loss  Negative for chills, diaphoresis and fever  Cardiovascular: Positive for dyspnea on exertion, leg swelling and orthopnea  Negative for chest pain, palpitations and syncope  Respiratory: Positive for cough, shortness of breath and sputum production  Gastrointestinal: Negative for bloating, abdominal pain, nausea and vomiting  Neurological: Positive for dizziness  Negative for light-headedness and weakness  Psychiatric/Behavioral: Negative for altered mental status  All other systems reviewed and are negative      Historical Information   Past Medical History:   Diagnosis Date    [facility]     CAD (coronary artery disease)     Chronic kidney disease     Diabetes [facility]     Dyslipidemia     Endocrine pancreas disorder     GERD (gastroesophageal reflux disease)     Hyperlipidemia     Hypertension     Kidney stones     PAD (peripheral artery disease) ([facility] )      Past Surgical History:   Procedure Laterality Date    APPENDECTOMY      COLECTOMY      COLONOSCOPY  2013    CORONARY ARTERY BYPASS GRAFT  2013    X 2    FEMORAL ARTERY - POPLITEAL ARTERY BYPASS GRAFT      HEMORRHOID SURGERY      IR AORTAGRAM WITH RUN-OFF  11/19/2018    [place] SLCTV CATHJ 3RD+ ORD SLCTV ABDL PEL/LXTR [facility] Left 8/12/2016    Procedure: LEFT FEMORAL ARTERIOGRAM; BALLOON ANGIOPLASTY; SFA  AND FEMORAL AT VEIN GRAFT;  Surgeon: Cassy Pradhan MD;  [facility];  Service: Vascular    TONSILLECTOMY AND ADENOIDECTOMY       Social History     Substance and Sexual Activity   Alcohol Use Yes    Comment: occasional     Social History     Substance and Sexual Activity   Drug Use No     Social History     Tobacco Use Smoking Status Current Every Day Smoker    Packs/day: 1 00    Years: 50 00    Pack years: 50 00    Types: Cigarettes   Smokeless Tobacco Never Used     Family History:   Family History   Problem Relation Age of Onset    Heart attack Father     Other Sister         bypass and vlave replacement    Stroke Paternal Uncle     Arrhythmia Neg Hx     Asthma Neg Hx     Clotting disorder Neg Hx     Fainting Neg Hx     Anuerysm Neg Hx     Hypertension Neg Hx         unsure     Hyperlipidemia Neg Hx     Heart failure Neg Hx        Meds/Allergies   all current active meds have been reviewed and current meds:   Current Facility-Administered Medications   Medication Dose Route Frequency    aspirin chewable tablet 324 mg  324 mg Oral Once    heparin (porcine) 25,000 units in 0 45% in sodium chloride 250 ml infusion (CMPD)  3-20 Units/kg/hr (Order-Specific) Intravenous Titrated    heparin (porcine) injection 2,000 Units  2,000 Units Intravenous Q1H PRN    heparin (porcine) injection 4,000 Units  4,000 Units Intravenous Once    heparin (porcine) injection 4,000 Units  4,000 Units Intravenous Q1H PRN     heparin (porcine), 3-20 Units/kg/hr (Order-Specific)      No Known Allergies    Objective   Vitals: Blood pressure 115/51, pulse 86, temperature 97 6 °F (36 4 °C), temperature source Oral, resp  rate 20, SpO2 93 %  , There is no height or weight on file to calculate BMI ,     Systolic (00UHJ), QNP:363 , Min:115 , XD     Diastolic (92GDS), TC, Min:51, Max:51    No intake or output data in the 24 hours ending 22 1517  Wt Readings from Last 3 Encounters:   22 77 2 kg (170 lb 1 6 oz)   22 77 6 kg (171 lb)   22 77 1 kg (170 lb)     Invasive Devices  Report    Peripheral Intravenous Line            Peripheral IV 22 Left Antecubital <1 day              Physical Exam  Vitals reviewed  Constitutional:       General: He is not in acute distress  Appearance: He is ill-appearing  Neck:      Vascular: No hepatojugular reflux or JVD  Cardiovascular:      Rate and Rhythm: Normal rate and regular rhythm  Heart sounds: Murmur heard  Systolic murmur is present  No friction rub  No gallop  Pulmonary:      Effort: No respiratory distress  Breath sounds: Rales: bilateral       Comments: 2LNC  Abdominal:      General: Bowel sounds are normal  There is no distension  Palpations: Abdomen is soft  Tenderness: There is no abdominal tenderness  Musculoskeletal:         General: No tenderness  Normal range of motion  Cervical back: Neck supple  Right lower le+ Pitting Edema present  Left lower le+ Pitting Edema present  Skin:     General: Skin is warm and dry  Capillary Refill: Capillary refill takes 2 to 3 seconds  Findings: No erythema  Neurological:      Mental Status: He is alert and oriented to person, place, and time     Psychiatric:         Mood and Affect: Mood normal      LABORATORY RESULTS:      CBC with diff:   Results from last 7 days   Lab Units 22  1347   WBC Thousand/uL 6 32   HEMOGLOBIN g/dL 8 5*   HEMATOCRIT % 26 8*   MCV fL 89   PLATELETS Thousands/uL 256   MCH pg 28 3   MCHC g/dL 31 7   RDW % 15 5*   MPV fL 10 7   NRBC AUTO /100 WBCs 0     CMP:  Results from last 7 days   Lab Units 22  1347   POTASSIUM mmol/L 5 2   CHLORIDE mmol/L 106   CO2 mmol/L 23   BUN mg/dL 46*   CREATININE mg/dL 2 67*   CALCIUM mg/dL 8 9   AST U/L 18   ALT U/L 23   ALK PHOS U/L 68   EGFR ml/min/1 73sq m 21     BMP:  Results from last 7 days   Lab Units 22  1347   POTASSIUM mmol/L 5 2   CHLORIDE mmol/L 106   CO2 mmol/L 23   BUN mg/dL 46*   CREATININE mg/dL 2 67*   CALCIUM mg/dL 8 9     Lab Results   Component Value Date    CREATININE 2 67 (H) 2022    CREATININE 1 77 (H) 2022    CREATININE 1 83 (H) 2021     Results from last 7 days   Lab Units 22  1347   INR  1 11     Lipid Profile:   No results found for: CHOL  Lab Results   Component Value Date    HDL 31 (L) 2021    HDL 39 (L) 09/15/2020    HDL 36 (L) 2020     Lab Results   Component Value Date    LDLCALC 36 2021    LDLCALC 36 09/15/2020    LDLCALC 52 2020     Lab Results   Component Value Date    TRIG 90 2021    TRIG 85 09/15/2020    TRIG 81 2020     Cardiac testing:   Results for orders placed during the hospital encounter of 21    Echo complete with contrast if indicated    Narrative  [facility], [address] [place] [address]  [phone]    Transthoracic Echocardiogram  2D, M-mode, Doppler, and Color Doppler    Study date:  06-May-2021    Patient: Yolanda Zafar  MR number: [de-identified]  Account number: [de-identified]  : 1945  Age: 64 years  Gender: Male  Status: Outpatient  Location: [place] [address]   Height: 70 in  Weight: 181 lb  BP: 150/ 58 mmHg    Indications: Assess the aortic valve  Diagnoses: I35 0 - Nonrheumatic aortic (valve) stenosis    Sonographer:  Christin Massey RDCS  Primary Physician:  Nirmal Hartley MD  Referring Physician:  Raven Carbajal MD  Group:  [facility]  Interpreting Physician:  Mendoza Vazquez MD    SUMMARY    LEFT VENTRICLE:  Systolic function was normal  Ejection fraction was estimated to be 60 %  There were no regional wall motion abnormalities  Wall thickness was mildly increased  Doppler parameters were consistent with abnormal left ventricular relaxation (grade 1 diastolic dysfunction)  LEFT ATRIUM:  The atrium was mildly dilated  MITRAL VALVE:  There was mild regurgitation  AORTIC VALVE:  The valve was probably trileaflet  Leaflets exhibited moderately increased thickness and moderate calcification  There was severe stenosis  There was trace regurgitation  Valve peak gradient was 63 mmHg  Valve mean gradient was 32 mmHg  Aortic valve area was 1 cmï¾² by the continuity equation    Estimated aortic valve area (by VTI) was 0 91 cmï¾²  TRICUSPID VALVE:  There was trace regurgitation  HISTORY: PRIOR HISTORY: CAD, bradycardia, DM, dyslipidemia, PAD, CABG,    PROCEDURE: The study was performed in the [place]  This was a routine study  The transthoracic approach was used  The study included complete 2D imaging, M-mode, complete spectral Doppler, and color Doppler  The heart rate was  66 bpm, at the start of the study  Images were obtained from the parasternal, apical, subcostal, and suprasternal notch acoustic windows  Image quality was adequate  LEFT VENTRICLE: Size was normal  Systolic function was normal  Ejection fraction was estimated to be 60 %  There were no regional wall motion abnormalities  Wall thickness was mildly increased  DOPPLER: Doppler parameters were consistent  with abnormal left ventricular relaxation (grade 1 diastolic dysfunction)  RIGHT VENTRICLE: The size was normal  Systolic function was normal  Wall thickness was normal     LEFT ATRIUM: The atrium was mildly dilated  RIGHT ATRIUM: Size was normal     MITRAL VALVE: Valve structure was normal  There was normal leaflet separation  DOPPLER: The transmitral velocity was within the normal range  There was no evidence for stenosis  There was mild regurgitation  AORTIC VALVE: The valve was probably trileaflet  Leaflets exhibited moderately increased thickness and moderate calcification  DOPPLER: There was severe stenosis  There was trace regurgitation  TRICUSPID VALVE: The valve structure was normal  There was normal leaflet separation  DOPPLER: The transtricuspid velocity was within the normal range  There was no evidence for stenosis  There was trace regurgitation  PULMONIC VALVE: Leaflets exhibited normal thickness, no calcification, and normal cuspal separation  DOPPLER: The transpulmonic velocity was within the normal range  There was no significant regurgitation  PERICARDIUM: There was no pericardial effusion   The pericardium was normal in appearance  AORTA: The root exhibited normal size  SYSTEMIC VEINS: IVC: The inferior vena cava was normal in size  MEASUREMENT TABLES    2D MEASUREMENTS  LVOT   (Reference normals)  Diam   23 mm   (--)    DOPPLER MEASUREMENTS  LVOT   (Reference normals)  VTI   24 cm   (--)  R-R interval   1017 ms   (--)  HR   59 bpm   (--)  Stroke vol   99 71 ml   (--)  Cardiac output   5 88 L/min   (--)  Cardiac index   2 94 L/min/mï¾²   (--)  Aortic valve   (Reference normals)  VTI   108 cm   (--)  Peak gradient   63 mmHg   (--)  Mean gradient   32 mmHg   (--)  Valve area, cont   1 cmï¾²   (--)  Obstr index, VTI   0 22    (--)  Valve area, VTI   0 91 cmï¾²   (--)  Area index, VTI   0 46 cmï¾²/mï¾²   (--)    SYSTEM MEASUREMENT TABLES    2D  %FS: 23 52 %  Ao Diam: 3 06 cm  EDV(Teich): 137 67 ml  EF(Teich): 46 6 %  ESV(Teich): 73 51 ml  IVSd: 1 26 cm  LA Area: 24 18 cm2  LA Diam: 4 19 cm  LVEDV MOD A4C: 198 32 ml  LVEF MOD A4C: 54 08 %  LVESV MOD A4C: 91 07 ml  LVIDd: 5 34 cm  LVIDs: 4 08 cm  LVLd A4C: 10 15 cm  LVLs A4C: 8 98 cm  LVOT Diam: 2 43 cm  LVPWd: 1 18 cm  RA Area: 14 06 cm2  RVIDd: 3 75 cm  SV MOD A4C: 107 24 ml  SV(Teich): 64 16 ml    CW  AV Env  Ti: 414 84 ms  AV MaxP 54 mmHg  AV VTI: 103 91 cm  AV Vmax: 3 82 m/s  AV Vmean: 2 5 m/s  AV meanP 08 mmHg    MM  TAPSE: 2 13 cm    PW  LUIS (VTI): 1 06 cm2  LUIS Vmax: 1 04 cm2  E' Sept: 0 04 m/s  E/E' Sept: 22 83  LVOT Env  Ti: 482 08 ms  LVOT VTI: 23 7 cm  LVOT Vmax: 0 86 m/s  LVOT Vmean: 0 5 m/s  LVOT maxP 94 mmHg  LVOT meanP 25 mmHg  LVSV Dopp: 110 25 ml  MV A Pop: 1 23 m/s  MV Dec Nash: 5 05 m/s2  MV DecT: 180 67 ms  MV E Pop: 0 91 m/s  MV E/A Ratio: 0 74  MV PHT: 52 39 ms  MVA By PHT: 4 2 cm2    [facility]    Prepared and electronically signed by    Teodora Dubin, MD  Signed 06-May-2021 16:40:31    Thank you for allowing us to participate in this patient's care   This pt will follow up with Dr Maliha Jimenez once discharged  Counseling / Coordination of Care  Total floor / unit time spent today 45 minutes  Greater than 50% of total time was spent with the patient and / or family counseling and / or coordination of care  A description of the counseling / coordination of care: Review of history, current assessment, development of a plan  Code Status: No Order    ** Please Note: Dragon 360 Dictation voice to text software may have been used in the creation of this document   **

## 2022-03-22 NOTE — ASSESSMENT & PLAN NOTE
Wt Readings from Last 3 Encounters:   03/11/22 77 2 kg (170 lb 1 6 oz)   03/02/22 77 6 kg (171 lb)   02/21/22 77 1 kg (170 lb)     On presentation - Chief complaint shortness of breath, over a week and half, worse with exertion  With associated orthopnea, cough productive of clear sputum, palpitations, and lower extremity swelling  Patient denied chest pain  Patient with extensive cardiac history including severe AS, CAD s/p CABG, severe B/L carotid disease, PAD s/p bypass, beta-blocker induced bradycardia    EKG - normal sinus rhythm, with ST depression in inferolateral leads with T-wave inversion  CXR - vascular congestion with bilateral pleural effusions  Troponin at [facility] 674, at [facility] 588 with delta -86, at [facility] with delta 53  NT proBNP 34,910  Patient's last echocardiogram was on 05/2021 which showed an EF of 60%, and a severe aortic stenosis  Etiology of patient's symptoms likely from acute heart failure secondary to severe AS disease, VS ACS/NSTEMI    While in ED, assessed Cardiology team and initially started on heparin drip due to concerns of ACS  Heparin drip discontinued, as presentation is more suggestive of acute heart failure      Plan  Telemetry monitoring  Echocardiogram  Cardiology consulted, appreciate recommendations  Receiving x1 Lasix 80 mg IV  Reassess in morning, consider Lasix 40 mg b i d   Weights daily and I/O's  Diet cardiovascular 2 g sodium  Continue home ASA and statin  Holding home benazepril and hydralazine in the setting of SHOAIB

## 2022-03-22 NOTE — ASSESSMENT & PLAN NOTE
Patient reports smoking 45 cigarettes per day  Has been attempting to cut down on smoking      Plan  Counseled on smoking cessation

## 2022-03-23 ENCOUNTER — EPISODE CHANGES (OUTPATIENT)
Dept: CASE MANAGEMENT | Facility: OTHER | Age: 77
End: 2022-03-23

## 2022-03-23 PROBLEM — J96.01 ACUTE RESPIRATORY FAILURE WITH HYPOXIA (HCC): Status: ACTIVE | Noted: 2022-03-23

## 2022-03-23 LAB
ANION GAP SERPL CALCULATED.3IONS-SCNC: 10 MMOL/L (ref 4–13)
APTT PPP: 35 SECONDS (ref 23–37)
ATRIAL RATE: 65 BPM
BUN SERPL-MCNC: 51 MG/DL (ref 5–25)
CALCIUM SERPL-MCNC: 8.7 MG/DL (ref 8.3–10.1)
CHLORIDE SERPL-SCNC: 105 MMOL/L (ref 100–108)
CO2 SERPL-SCNC: 24 MMOL/L (ref 21–32)
CREAT SERPL-MCNC: 2.42 MG/DL (ref 0.6–1.3)
ERYTHROCYTE [DISTWIDTH] IN BLOOD BY AUTOMATED COUNT: 15.4 % (ref 11.6–15.1)
EST. AVERAGE GLUCOSE BLD GHB EST-MCNC: 114 MG/DL
GFR SERPL CREATININE-BSD FRML MDRD: 24 ML/MIN/1.73SQ M
GLUCOSE SERPL-MCNC: 118 MG/DL (ref 65–140)
GLUCOSE SERPL-MCNC: 142 MG/DL (ref 65–140)
GLUCOSE SERPL-MCNC: 146 MG/DL (ref 65–140)
GLUCOSE SERPL-MCNC: 149 MG/DL (ref 65–140)
GLUCOSE SERPL-MCNC: 166 MG/DL (ref 65–140)
HBA1C MFR BLD: 5.6 %
HCT VFR BLD AUTO: 25.3 % (ref 36.5–49.3)
HGB BLD-MCNC: 8.3 G/DL (ref 12–17)
MAGNESIUM SERPL-MCNC: 2.2 MG/DL (ref 1.6–2.6)
MCH RBC QN AUTO: 27.8 PG (ref 26.8–34.3)
MCHC RBC AUTO-ENTMCNC: 32.8 G/DL (ref 31.4–37.4)
MCV RBC AUTO: 85 FL (ref 82–98)
P AXIS: 88 DEGREES
PLATELET # BLD AUTO: 230 THOUSANDS/UL (ref 149–390)
PMV BLD AUTO: 10.5 FL (ref 8.9–12.7)
POTASSIUM SERPL-SCNC: 4.4 MMOL/L (ref 3.5–5.3)
PR INTERVAL: 162 MS
QRS AXIS: 103 DEGREES
QRSD INTERVAL: 104 MS
QT INTERVAL: 404 MS
QTC INTERVAL: 420 MS
RBC # BLD AUTO: 2.99 MILLION/UL (ref 3.88–5.62)
SODIUM SERPL-SCNC: 139 MMOL/L (ref 136–145)
T WAVE AXIS: 239 DEGREES
VENTRICULAR RATE: 65 BPM
WBC # BLD AUTO: 6 THOUSAND/UL (ref 4.31–10.16)

## 2022-03-23 PROCEDURE — 85730 THROMBOPLASTIN TIME PARTIAL: CPT

## 2022-03-23 PROCEDURE — 80048 BASIC METABOLIC PNL TOTAL CA: CPT

## 2022-03-23 PROCEDURE — 83036 HEMOGLOBIN GLYCOSYLATED A1C: CPT

## 2022-03-23 PROCEDURE — 93010 ELECTROCARDIOGRAM REPORT: CPT | Performed by: INTERNAL MEDICINE

## 2022-03-23 PROCEDURE — 85027 COMPLETE CBC AUTOMATED: CPT

## 2022-03-23 PROCEDURE — 99232 SBSQ HOSP IP/OBS MODERATE 35: CPT | Performed by: INTERNAL MEDICINE

## 2022-03-23 PROCEDURE — 82948 REAGENT STRIP/BLOOD GLUCOSE: CPT

## 2022-03-23 PROCEDURE — 83735 ASSAY OF MAGNESIUM: CPT

## 2022-03-23 RX ORDER — FUROSEMIDE 10 MG/ML
80 INJECTION INTRAMUSCULAR; INTRAVENOUS
Status: DISCONTINUED | OUTPATIENT
Start: 2022-03-23 | End: 2022-03-24

## 2022-03-23 RX ORDER — FUROSEMIDE 10 MG/ML
40 INJECTION INTRAMUSCULAR; INTRAVENOUS ONCE
Status: COMPLETED | OUTPATIENT
Start: 2022-03-23 | End: 2022-03-23

## 2022-03-23 RX ORDER — FUROSEMIDE 10 MG/ML
40 INJECTION INTRAMUSCULAR; INTRAVENOUS
Status: DISCONTINUED | OUTPATIENT
Start: 2022-03-23 | End: 2022-03-23

## 2022-03-23 RX ADMIN — PANTOPRAZOLE SODIUM 40 MG: 40 TABLET, DELAYED RELEASE ORAL at 08:56

## 2022-03-23 RX ADMIN — FUROSEMIDE 80 MG: 10 INJECTION, SOLUTION INTRAMUSCULAR; INTRAVENOUS at 17:48

## 2022-03-23 RX ADMIN — PANCRELIPASE 24000 UNITS: 24000; 76000; 120000 CAPSULE, DELAYED RELEASE PELLETS ORAL at 08:57

## 2022-03-23 RX ADMIN — FUROSEMIDE 40 MG: 10 INJECTION, SOLUTION INTRAMUSCULAR; INTRAVENOUS at 11:22

## 2022-03-23 RX ADMIN — FUROSEMIDE 40 MG: 10 INJECTION, SOLUTION INTRAMUSCULAR; INTRAVENOUS at 08:57

## 2022-03-23 RX ADMIN — AMLODIPINE BESYLATE 10 MG: 10 TABLET ORAL at 08:56

## 2022-03-23 RX ADMIN — HEPARIN SODIUM 5000 UNITS: 5000 INJECTION INTRAVENOUS; SUBCUTANEOUS at 17:48

## 2022-03-23 RX ADMIN — PANCRELIPASE 24000 UNITS: 24000; 76000; 120000 CAPSULE, DELAYED RELEASE PELLETS ORAL at 17:49

## 2022-03-23 RX ADMIN — INSULIN LISPRO 1 UNITS: 100 INJECTION, SOLUTION INTRAVENOUS; SUBCUTANEOUS at 11:29

## 2022-03-23 RX ADMIN — IRON SUCROSE 300 MG: 20 INJECTION, SOLUTION INTRAVENOUS at 08:57

## 2022-03-23 RX ADMIN — HEPARIN SODIUM 5000 UNITS: 5000 INJECTION INTRAVENOUS; SUBCUTANEOUS at 05:21

## 2022-03-23 RX ADMIN — DOCUSATE SODIUM 100 MG: 100 CAPSULE ORAL at 17:48

## 2022-03-23 RX ADMIN — DOCUSATE SODIUM 100 MG: 100 CAPSULE ORAL at 08:55

## 2022-03-23 RX ADMIN — ASPIRIN 81 MG: 81 TABLET, COATED ORAL at 08:56

## 2022-03-23 RX ADMIN — PANCRELIPASE 24000 UNITS: 24000; 76000; 120000 CAPSULE, DELAYED RELEASE PELLETS ORAL at 12:56

## 2022-03-23 RX ADMIN — ATORVASTATIN CALCIUM 80 MG: 40 TABLET, FILM COATED ORAL at 21:36

## 2022-03-23 RX ADMIN — NICOTINE 1 PATCH: 7 PATCH, EXTENDED RELEASE TRANSDERMAL at 08:56

## 2022-03-23 NOTE — ASSESSMENT & PLAN NOTE
Home meds include amlodipine 10 mg daily, benazepril 40 mg daily, and hydralazine 50 mg t i d   Patient not on diuretics at home    BP acceptable    Plan  Hold home benazepril, hydralazine in the setting of SHOAIB  Continue amlodipine 10 mg daily  Monitor BP closely

## 2022-03-23 NOTE — PROGRESS NOTES
[facility]  Progress Note - Basil Mancera  1945, 76 y o  male MRN: [id number]  Unit/Bed#: S -01 Encounter: [id number]  Primary Care Provider: Semaj Zazueta MD   Date and time admitted to hospital: 3/22/2022  1:22 PM    * Acute CHF (congestive heart failure) ([address] )  Assessment & Plan  Wt Readings from Last 3 Encounters:   03/23/22 78 4 kg (172 lb 12 8 oz)   03/11/22 77 2 kg (170 lb 1 6 oz)   03/02/22 77 6 kg (171 lb)     On presentation - Chief complaint shortness of breath, over a week and half, worse with exertion  With associated orthopnea, cough productive of clear sputum, palpitations, and lower extremity swelling  Patient denied chest pain  Patient with extensive cardiac history including severe AS, CAD s/p CABG, severe B/L carotid disease, PAD s/p bypass, beta-blocker induced bradycardia    EKG - normal sinus rhythm, with ST depression in inferolateral leads with T-wave inversion  CXR - vascular congestion with bilateral pleural effusions  Troponin at [facility] 674, at [facility] 588 with delta -86, at [facility] with delta 53  NT proBNP 34,910  Patient's last echocardiogram was on 05/2021 which showed an EF of 60%, and a severe aortic stenosis  Etiology of patient's symptoms likely from acute heart failure secondary to severe AS disease, VS ACS/NSTEMI    While in ED, assessed Cardiology team and initially started on heparin drip due to concerns of ACS  Heparin drip discontinued, as presentation is more suggestive of acute heart failure  On evaluation, diuresing appropriately  Bibasilar crackles improved, lower extremity pitting edema now 1+, and remaining on 2 L O2 NC  Negative JVD      Plan  Telemetry monitoring  Echocardiogram to be performed today  Cardiology consulted, appreciate recommendations  As per Cardiology, Lasix 80 mg b i d   Weights daily and I/O's  Diet cardiovascular 2 g sodium  Continue home ASA and statin  Holding home benazepril and hydralazine in the setting of SHOAIB    Nonrheumatic aortic valve stenosis  Assessment & Plan  Per chart review, documentation suggests 1st noted in 2015 on echocardiogram   Aortic valve stenosis noted as severe on 05/2021  Patient follows up with outpatient Cardiology Dr Barb Lambert  At last follow-up with Cardiology, patient considering TAVR and was to follow-up with CT surgery  Plan  As above in "acute CHF"  Continue outpatient follow-up with Cardiology    Acute respiratory failure with hypoxia [facility])  Assessment & Plan  POA, patient requiring 2 L O2 NC  Baseline is room air  Most likely etiology secondary to acute CHF  Patient in no respiratory distress  Patient found on 2 L O2 NC  Patient reports dyspnea worse with exertion    Plan  IV diuresis  Wean O2 as tolerated, keeping saturations > 88%  Monitor respiratory status    Acute kidney injury superimposed on chronic kidney disease [facility])  Assessment & Plan  Lab Results   Component Value Date    EGFR 24 03/23/2022    EGFR 21 03/22/2022    EGFR 35 01/21/2022    CREATININE 2 42 (H) 03/23/2022    CREATININE 2 67 (H) 03/22/2022    CREATININE 1 77 (H) 01/21/2022   POA, 2 67  Baseline is 1 6-1 8  Likely in the setting of volume overload/CHF most likely, use of nephrotoxic meds, less likely poor oral intake of fluids  Patient follows up with outpatient Nephrology Dr Marcos Campoverde    Improving on diuresis  Plan  Diuresis - Lasix 80 mg IV b i d  Avoid nephrotoxins - holding home benazepril, hydralazine  Avoid hypotension  Monitor I/O's, daily weights    Smoker  Assessment & Plan  Patient reports smoking 45 cigarettes per day  Has been attempting to cut down on smoking  Plan  Counseled on smoking cessation    Iron deficiency anemia  Assessment & Plan  Iron panel from 01/2022 showed low iron and low iron saturation  POA, hemoglobin 8 5  Prior to admission most recent hemoglobin was 9 7  No signs of bleeding      Plan  IV Venofer x3 doses  A m  CBC    Type 2 diabetes mellitus, without long-term current use of insulin [facility])  Assessment & Plan  Lab Results   Component Value Date    HGBA1C 5 6 2022     Recent Labs     22  1853 22  2118 22  0709 22  1056   POCGLU 111 161* 142* 166*       Blood Sugar Average: Last 72 hrs:    Home meds include metformin  Diabetes mellitus well controlled  Plan  Holding home oral hypoglycemic med  SSI and POCT a c  And HS  Hypoglycemia protocol  Diabetic diet    Hypertension  Assessment & Plan  Home meds include amlodipine 10 mg daily, benazepril 40 mg daily, and hydralazine 50 mg t i d   Patient not on diuretics at home  BP acceptable    Plan  Hold home benazepril, hydralazine in the setting of SHOAIB  Continue amlodipine 10 mg daily  Monitor BP closely      VTE Pharmacologic Prophylaxis:   VTE Score: 5 High Risk (Score >/= 5) - Pharmacological DVT Prophylaxis Ordered: Heparin  Sequential Compression Devices Ordered  Mechanical VTE Prophylaxis in Place: Yes    Patient Centered Rounds: I have performed bedside rounds with nursing staff today  Discussions with Specialists or Other Care Team Provider:  Cardiology    Education and Discussions with Family / Patient: Updated  (son) via phone  Current Length of Stay: 1 day(s)    Current Patient Status: Inpatient     Discharge Plan / Estimated Discharge Date: Anticipate discharge in 48 hrs to home  Code Status: Level 1 - Full Code      Subjective:   Nursing team reported no overnight events  Patient reported continued shortness of breath worse with exertion  Patient did deny chest pain, cough, palpitations, abdominal pain nausea, vomiting  Patient found sitting eating breakfast in so far, in no distress on 2 L O2 NC      Objective:     Vitals:   Temp (24hrs), Av 9 °F (36 6 °C), Min:97 8 °F (36 6 °C), Max:98 °F (36 7 °C)    Temp:  [97 8 °F (36 6 °C)-98 °F (36 7 °C)] 98 °F (36 7 °C)  HR:  [62-78] 72  Resp:  [18-19] 18  BP: (138-154)/(60-87) 138/60  SpO2: [90 %-96 %] 93 %  Body mass index is 24 79 kg/m²  Input and Output Summary (last 24 hours): Intake/Output Summary (Last 24 hours) at 3/23/2022 1216  Last data filed at 3/23/2022 1126  Gross per 24 hour   Intake 258 ml   Output 950 ml   Net -692 ml       Physical Exam:     Physical Exam  Vitals and nursing note reviewed  Constitutional:       General: He is not in acute distress  Appearance: Normal appearance  He is not ill-appearing  HENT:      Head: Normocephalic and atraumatic  Mouth/Throat:      Mouth: Mucous membranes are moist       Pharynx: Oropharynx is clear  Eyes:      General: No scleral icterus  Conjunctiva/sclera: Conjunctivae normal    Neck:      Vascular: No JVD  Cardiovascular:      Rate and Rhythm: Normal rate and regular rhythm  Pulses: Normal pulses  Heart sounds: No murmur heard  No gallop  Pulmonary:      Effort: Pulmonary effort is normal  No respiratory distress  Breath sounds: Rales (Bibasilar) present  No wheezing  Abdominal:      General: Bowel sounds are normal  There is no distension  Palpations: Abdomen is soft  There is no mass  Tenderness: There is no abdominal tenderness  Musculoskeletal:         General: No tenderness  Normal range of motion  Cervical back: Normal range of motion and neck supple  No tenderness  Right lower le+ Pitting Edema present  Left lower le+ Pitting Edema present  Skin:     General: Skin is warm and dry  Capillary Refill: Capillary refill takes less than 2 seconds  Neurological:      General: No focal deficit present  Mental Status: He is alert and oriented to person, place, and time  Mental status is at baseline  Sensory: No sensory deficit  Psychiatric:         Mood and Affect: Mood normal          Behavior: Behavior normal          Thought Content:  Thought content normal          Judgment: Judgment normal           Additional Data:     Labs:  Results from last 7 days   Lab Units 03/23/22  0510 03/22/22  1347 03/22/22  1347   WBC Thousand/uL 6 00   < > 6 32   HEMOGLOBIN g/dL 8 3*   < > 8 5*   HEMATOCRIT % 25 3*   < > 26 8*   PLATELETS Thousands/uL 230   < > 256   NEUTROS PCT %  --   --  76*   LYMPHS PCT %  --   --  14   MONOS PCT %  --   --  9   EOS PCT %  --   --  0    < > = values in this interval not displayed  Results from last 7 days   Lab Units 03/23/22  0510 03/22/22  1347 03/22/22  1347   SODIUM mmol/L 139   < > 141   POTASSIUM mmol/L 4 4   < > 5 2   CHLORIDE mmol/L 105   < > 106   CO2 mmol/L 24   < > 23   BUN mg/dL 51*   < > 46*   CREATININE mg/dL 2 42*   < > 2 67*   ANION GAP mmol/L 10   < > 12   CALCIUM mg/dL 8 7   < > 8 9   ALBUMIN g/dL  --   --  3 4*   TOTAL BILIRUBIN mg/dL  --   --  0 36   ALK PHOS U/L  --   --  68   ALT U/L  --   --  23   AST U/L  --   --  18   GLUCOSE RANDOM mg/dL 118   < > 101    < > = values in this interval not displayed       Results from last 7 days   Lab Units 03/22/22  1347   INR  1 11     Results from last 7 days   Lab Units 03/23/22  1056 03/23/22  0709 03/22/22  2118 03/22/22  1853   POC GLUCOSE mg/dl 166* 142* 161* 111     Results from last 7 days   Lab Units 03/23/22  0510   HEMOGLOBIN A1C % 5 6           Imaging: Reviewed radiology reports from this admission including: chest xray    Recent Cultures (last 7 days):           Lines/Drains:  Invasive Devices  Report    Peripheral Intravenous Line            Peripheral IV 03/22/22 Left Antecubital <1 day    Peripheral IV 03/22/22 Right Antecubital <1 day                Telemetry:   Telemetry Orders (From admission, onward)             48 Hour Telemetry Monitoring  (ED Bridging Orders Panel)  Continuous x 48 hours        References:    Telemetry Guidelines   Question:  Reason for 48 Hour Telemetry  Answer:  Acute MI, chest pain - R/O MI, or unstable angina                    Last 24 Hours Medication List:   Current Facility-Administered Medications   Medication Dose Route Frequency Provider Last Rate    acetaminophen  650 mg Oral Q6H PRN Baldo Simon MD      amLODIPine  10 mg Oral Daily Baldo Simon MD      aspirin  81 mg Oral Daily Baldo Simon MD      atorvastatin  80 mg Oral HS Baldo Simon MD      docusate sodium  100 mg Oral BID Baldo Simon MD      furosemide  80 mg Intravenous BID (diuretic) Ricki Mohs, MD      heparin (porcine)  5,000 Units Subcutaneous [facility] Baldo Simon MD      insulin lispro  1-6 Units Subcutaneous 4x Daily [facility] SCR[facility] & HS) Baldo Simon MD      iron sucrose  300 mg Intravenous Daily Baldo Simon  mg (03/22/22 2112)    nicotine  1 patch Transdermal Daily Baldo Simon MD      ondansetron  4 mg Intravenous Q6H PRN Baldo Simon MD      pancrelipase (Lip-Prot-Amyl)  24,000 Units Oral TID With Meals Baldo Simon MD      pantoprazole  40 mg Oral Daily Baldo Simon MD      polyethylene glycol  17 g Oral Daily Baldo Simon MD          Today, Patient Was Seen By: Baldo Simon MD    ** Please Note: This note has been constructed using a voice recognition system   **

## 2022-03-23 NOTE — ASSESSMENT & PLAN NOTE
Lab Results   Component Value Date    EGFR 24 03/23/2022    EGFR 21 03/22/2022    EGFR 35 01/21/2022    CREATININE 2 42 (H) 03/23/2022    CREATININE 2 67 (H) 03/22/2022    CREATININE 1 77 (H) 01/21/2022   POA, 2 67  Baseline is 1 6-1 8  Likely in the setting of volume overload/CHF most likely, use of nephrotoxic meds, less likely poor oral intake of fluids  Patient follows up with outpatient Nephrology Dr Shaw Loza    Improving on diuresis  Plan  Diuresis - Lasix 80 mg IV b i d    Avoid nephrotoxins - holding home benazepril, hydralazine  Avoid hypotension  Monitor I/O's, daily weights

## 2022-03-23 NOTE — ASSESSMENT & PLAN NOTE
Per chart review, documentation suggests 1st noted in 2015 on echocardiogram   Aortic valve stenosis noted as severe on 05/2021  Patient follows up with outpatient Cardiology Dr Mirta De Los Santos  At last follow-up with Cardiology, patient considering TAVR and was to follow-up with CT surgery      Plan  As above in "acute CHF"  Continue outpatient follow-up with Cardiology

## 2022-03-23 NOTE — PROGRESS NOTES
General Cardiology   Progress Note -  [facility]   Salbador Seip  76 y o  male MRN: [id number]  Unit/Bed#: S -01 Encounter: [id number]      Assessment  1  Non MI troponin elevation- 1st troponin 674, 2 hr troponin 588 (negative delta)  ECG shows NSR with LVH and repolarization abnormalities  No complaints of chest pain but does note dyspnea on exertion likely in setting of AS and CHF  2  Acute diastolic CHF- CXR with vascular congestion and small right effusion, on 2 L nasal cannula, NT proBNP 05019  Remains overloaded on exam today  3  Severe AS- TTE May 2021-LVEF 60% with severe AS with LUIS 0 91 cm2, peak gradient 63 mmHg and mean gradient 32 mmHg  4  Acute hypoxic respiratory failure- does not wear home O2, currently on 2 L nasal cannula  5  SHOAIB on CKD- creatinine 2 67-->2 42 with diuresis  SHOAIB in setting of acute CHF/volume overload  Baseline 1 6-1 8; recently established with Dr Raf Silva   6  CAD s/p CABG x2 September 2013  7  Hypertension- average /67  8  Dyslipidemia- lipid panel from June 2021-cholesterol 85, triglycerides 90, HDL 31, LDL 36  On atorvastatin 80 mg daily  9  Bilateral carotid stenosis, PAD s/p LLE revascularization  10  Chronic anemia- hgb 8 3  11  Tobacco abuse-smoking 4-5 cigarettes per day, trying to cut back      Plan  Start Lasix 80 mg IV BID  Weight is down 3 lb to 172 lb  Renal function improving with diuresis, check am BMP  Updated echo to be completed today  Continue close monitoring of I's and O's, daily standing weights  BP stable on amlodipine and IV Lasix  Continue aspirin and statin  Encouraged smoking cessation    Subjective  Review of Systems   Constitutional: Negative for chills and malaise/fatigue  Cardiovascular: Positive for dyspnea on exertion, leg swelling and orthopnea  Negative for chest pain, palpitations and syncope  Respiratory: Positive for cough and sputum production  Negative for sleep disturbances due to breathing  Gastrointestinal: Negative for bloating, nausea and vomiting  Neurological: Negative for dizziness, light-headedness and weakness  All other systems reviewed and are negative  Objective:   Vitals: Blood pressure 138/60, pulse 72, temperature 98 °F (36 7 °C), temperature source Oral, resp  rate 18, height 5' 10" (1 778 m), weight 78 4 kg (172 lb 12 8 oz), SpO2 93 %  , Body mass index is 24 79 kg/m²  ,     Systolic (90OBJ), SJD:547 , Min:115 , DHY:609     Diastolic (04BWW), QOV:91, Min:51, Max:87      Intake/Output Summary (Last 24 hours) at 3/23/2022 0924  Last data filed at 3/23/2022 0527  Gross per 24 hour   Intake 258 ml   Output 600 ml   Net -342 ml     Wt Readings from Last 3 Encounters:   22 78 4 kg (172 lb 12 8 oz)   22 77 2 kg (170 lb 1 6 oz)   22 77 6 kg (171 lb)     Telemetry Review: NSR, rare PVCs    Physical Exam  Vitals reviewed  Constitutional:       General: He is not in acute distress  Appearance: He is ill-appearing  Neck:      Vascular: No hepatojugular reflux or JVD  Cardiovascular:      Rate and Rhythm: Normal rate and regular rhythm  Heart sounds: Murmur (systolic) heard  No friction rub  No gallop  Pulmonary:      Effort: No respiratory distress  Breath sounds: Rales (R>L) present  Comments: 2LNC  Abdominal:      General: Bowel sounds are normal  There is no distension  Palpations: Abdomen is soft  Tenderness: There is no abdominal tenderness  Musculoskeletal:         General: No tenderness  Normal range of motion  Cervical back: Neck supple  Right lower le+ Pitting Edema present  Left lower le+ Pitting Edema present  Skin:     General: Skin is warm and dry  Findings: No erythema  Neurological:      Mental Status: He is alert and oriented to person, place, and time     Psychiatric:         Mood and Affect: Mood normal      LABORATORY RESULTS      CBC with diff:   Results from last 7 days   Lab Units 03/23/22  0510 03/22/22  1347   WBC Thousand/uL 6 00 6 32   HEMOGLOBIN g/dL 8 3* 8 5*   HEMATOCRIT % 25 3* 26 8*   MCV fL 85 89   PLATELETS Thousands/uL 230 256   MCH pg 27 8 28 3   MCHC g/dL 32 8 31 7   RDW % 15 4* 15 5*   MPV fL 10 5 10 7   NRBC AUTO /100 WBCs  --  0     CMP:  Results from last 7 days   Lab Units 03/23/22  0510 03/22/22  1347   POTASSIUM mmol/L 4 4 5 2   CHLORIDE mmol/L 105 106   CO2 mmol/L 24 23   BUN mg/dL 51* 46*   CREATININE mg/dL 2 42* 2 67*   CALCIUM mg/dL 8 7 8 9   AST U/L  --  18   ALT U/L  --  23   ALK PHOS U/L  --  68   EGFR ml/min/1 73sq m 24 21       BMP:  Results from last 7 days   Lab Units 03/23/22  0510 03/22/22  1347   POTASSIUM mmol/L 4 4 5 2   CHLORIDE mmol/L 105 106   CO2 mmol/L 24 23   BUN mg/dL 51* 46*   CREATININE mg/dL 2 42* 2 67*   CALCIUM mg/dL 8 7 8 9       Lab Results   Component Value Date    CREATININE 2 42 (H) 03/23/2022    CREATININE 2 67 (H) 03/22/2022    CREATININE 1 77 (H) 01/21/2022       Lab Results   Component Value Date    NTBNP 34,910 (H) 03/22/2022           Results from last 7 days   Lab Units 03/23/22  0510   MAGNESIUM mg/dL 2 2                     Results from last 7 days   Lab Units 03/22/22  1347   INR  1 11       Lipid Profile:   No results found for: CHOL  Lab Results   Component Value Date    HDL 31 (L) 06/30/2021    HDL 39 (L) 09/15/2020    HDL 36 (L) 01/23/2020     Lab Results   Component Value Date    LDLCALC 36 06/30/2021    LDLCALC 36 09/15/2020    LDLCALC 52 01/23/2020     Lab Results   Component Value Date    TRIG 90 06/30/2021    TRIG 85 09/15/2020    TRIG 81 01/23/2020       Cardiac testing:   Results for orders placed during the hospital encounter of 05/06/21    Echo complete with contrast if indicated    Narrative  [facility], [address] [facility]  [phone]    Transthoracic Echocardiogram  2D, M-mode, Doppler, and Color Doppler    Study date:  06-May-2021    Patient: Rosanna Couch  MR number: [id number]  Account number: [de-identified]  : 1945  Age: 64 years  Gender: Male  Status: Outpatient  Location: [facility]  Height: 70 in  Weight: 181 lb  BP: 150/ 58 mmHg    Indications: Assess the aortic valve  Diagnoses: I35 0 - Nonrheumatic aortic (valve) stenosis    Sonographer:  Jacob Velásquez RDCS  Primary Physician:  Toshia Brown MD  Referring Physician:  Adi Basilio MD  Group:  [facility]  Interpreting Physician:  Michelle Rodgers MD    SUMMARY    LEFT VENTRICLE:  Systolic function was normal  Ejection fraction was estimated to be 60 %  There were no regional wall motion abnormalities  Wall thickness was mildly increased  Doppler parameters were consistent with abnormal left ventricular relaxation (grade 1 diastolic dysfunction)  LEFT ATRIUM:  The atrium was mildly dilated  MITRAL VALVE:  There was mild regurgitation  AORTIC VALVE:  The valve was probably trileaflet  Leaflets exhibited moderately increased thickness and moderate calcification  There was severe stenosis  There was trace regurgitation  Valve peak gradient was 63 mmHg  Valve mean gradient was 32 mmHg  Aortic valve area was 1 cmï¾² by the continuity equation  Estimated aortic valve area (by VTI) was 0 91 cmï¾²  TRICUSPID VALVE:  There was trace regurgitation  HISTORY: PRIOR HISTORY: CAD, bradycardia, DM, dyslipidemia, PAD, CABG,    PROCEDURE: The study was performed in the [place] [address]  This was a routine study  The transthoracic approach was used  The study included complete 2D imaging, M-mode, complete spectral Doppler, and color Doppler  The heart rate was  66 bpm, at the start of the study  Images were obtained from the parasternal, apical, subcostal, and suprasternal notch acoustic windows  Image quality was adequate  LEFT VENTRICLE: Size was normal  Systolic function was normal  Ejection fraction was estimated to be 60 %   There were no regional wall motion abnormalities  Wall thickness was mildly increased  DOPPLER: Doppler parameters were consistent  with abnormal left ventricular relaxation (grade 1 diastolic dysfunction)  RIGHT VENTRICLE: The size was normal  Systolic function was normal  Wall thickness was normal     LEFT ATRIUM: The atrium was mildly dilated  RIGHT ATRIUM: Size was normal     MITRAL VALVE: Valve structure was normal  There was normal leaflet separation  DOPPLER: The transmitral velocity was within the normal range  There was no evidence for stenosis  There was mild regurgitation  AORTIC VALVE: The valve was probably trileaflet  Leaflets exhibited moderately increased thickness and moderate calcification  DOPPLER: There was severe stenosis  There was trace regurgitation  TRICUSPID VALVE: The valve structure was normal  There was normal leaflet separation  DOPPLER: The transtricuspid velocity was within the normal range  There was no evidence for stenosis  There was trace regurgitation  PULMONIC VALVE: Leaflets exhibited normal thickness, no calcification, and normal cuspal separation  DOPPLER: The transpulmonic velocity was within the normal range  There was no significant regurgitation  PERICARDIUM: There was no pericardial effusion  The pericardium was normal in appearance  AORTA: The root exhibited normal size  SYSTEMIC VEINS: IVC: The inferior vena cava was normal in size      MEASUREMENT TABLES    2D MEASUREMENTS  LVOT   (Reference normals)  Diam   23 mm   (--)    DOPPLER MEASUREMENTS  LVOT   (Reference normals)  VTI   24 cm   (--)  R-R interval   1017 ms   (--)  HR   59 bpm   (--)  Stroke vol   99 71 ml   (--)  Cardiac output   5 88 L/min   (--)  Cardiac index   2 94 L/min/mï¾²   (--)  Aortic valve   (Reference normals)  VTI   108 cm   (--)  Peak gradient   63 mmHg   (--)  Mean gradient   32 mmHg   (--)  Valve area, cont   1 cmï¾²   (--)  Obstr index, VTI   0 22    (--)  Valve area, VTI   0 91 cmï¾²   (--)  Area index, VTI   0 46 cmï¾²/mï¾²   (--)    SYSTEM MEASUREMENT TABLES    2D  %FS: 23 52 %  Ao Diam: 3 06 cm  EDV(Teich): 137 67 ml  EF(Teich): 46 6 %  ESV(Teich): 73 51 ml  IVSd: 1 26 cm  LA Area: 24 18 cm2  LA Diam: 4 19 cm  LVEDV MOD A4C: 198 32 ml  LVEF MOD A4C: 54 08 %  LVESV MOD A4C: 91 07 ml  LVIDd: 5 34 cm  LVIDs: 4 08 cm  LVLd A4C: 10 15 cm  LVLs A4C: 8 98 cm  LVOT Diam: 2 43 cm  LVPWd: 1 18 cm  RA Area: 14 06 cm2  RVIDd: 3 75 cm  SV MOD A4C: 107 24 ml  SV(Teich): 64 16 ml    CW  AV Env  Ti: 414 84 ms  AV MaxP 54 mmHg  AV VTI: 103 91 cm  AV Vmax: 3 82 m/s  AV Vmean: 2 5 m/s  AV meanP 08 mmHg    MM  TAPSE: 2 13 cm    PW  LUIS (VTI): 1 06 cm2  LUIS Vmax: 1 04 cm2  E' Sept: 0 04 m/s  E/E' Sept: 22 83  LVOT Env  Ti: 482 08 ms  LVOT VTI: 23 7 cm  LVOT Vmax: 0 86 m/s  LVOT Vmean: 0 5 m/s  LVOT maxP 94 mmHg  LVOT meanP 25 mmHg  LVSV Dopp: 110 25 ml  MV A Pop: 1 23 m/s  MV Dec Bland: 5 05 m/s2  MV DecT: 180 67 ms  MV E Pop: 0 91 m/s  MV E/A Ratio: 0 74  MV PHT: 52 39 ms  MVA By PHT: 4 2 cm2    [facility] Accredited Echocardiography Laboratory    Prepared and electronically signed by    Barrington Ziegler MD  Signed 06-May-2021 16:40:31    Meds/Allergies   all current active meds have been reviewed and current meds:   Current Facility-Administered Medications   Medication Dose Route Frequency    acetaminophen (TYLENOL) tablet 650 mg  650 mg Oral Q6H PRN    amLODIPine (NORVASC) tablet 10 mg  10 mg Oral Daily    aspirin (ECOTRIN LOW STRENGTH) EC tablet 81 mg  81 mg Oral Daily    atorvastatin (LIPITOR) tablet 80 mg  80 mg Oral HS    docusate sodium (COLACE) capsule 100 mg  100 mg Oral BID    furosemide (LASIX) injection 40 mg  40 mg Intravenous Once    furosemide (LASIX) injection 80 mg  80 mg Intravenous BID (diuretic)    heparin (porcine) subcutaneous injection 5,000 Units  5,000 Units Subcutaneous Q8H [facility]    insulin lispro (HumaLOG) 100 units/mL subcutaneous injection 1-6 Units  1-6 Units Subcutaneous 4x Daily (AC & HS)    iron sucrose (VENOFER) 300 mg in sodium chloride 0 9 % 250 mL IVPB  300 mg Intravenous Daily    nicotine (NICODERM CQ) 7 mg/24hr TD 24 hr patch 1 patch  1 patch Transdermal Daily    ondansetron (ZOFRAN) injection 4 mg  4 mg Intravenous Q6H PRN    pancrelipase (Lip-Prot-Amyl) (CREON) delayed release capsule 24,000 Units  24,000 Units Oral TID With Meals    pantoprazole (PROTONIX) EC tablet 40 mg  40 mg Oral Daily    polyethylene glycol (MIRALAX) packet 17 g  17 g Oral Daily     Medications Prior to Admission   Medication    amLODIPine (NORVASC) 10 mg tablet    AMYLASE-LIPASE-PROTEASE PO    aspirin (ECOTRIN LOW STRENGTH) 81 mg EC tablet    atorvastatin (LIPITOR) 80 mg tablet    benazepril (LOTENSIN) 40 MG tablet    clopidogrel (PLAVIX) 75 mg tablet    Cyanocobalamin (VITAMIN B12 PO)    hydrALAZINE (APRESOLINE) 50 mg tablet    Magnesium Oxide (MAG-200 PO)    metFORMIN (GLUCOPHAGE) 500 mg tablet    Multiple Vitamin (MULTIVITAMIN) tablet    pantoprazole (PROTONIX) 40 mg tablet    Ferrous Sulfate Dried (Feosol) 200 (65 Fe) MG TABS    Fluad Quadrivalent 0 5 ML PRSY     Counseling / Coordination of Care  Total floor / unit time spent today 20 minutes  Greater than 50% of total time was spent with the patient and / or family counseling and / or coordination of care  ** Please Note: Dragon 360 Dictation voice to text software may have been used in the creation of this document   **

## 2022-03-23 NOTE — ASSESSMENT & PLAN NOTE
POA, patient requiring 2 L O2 NC  Baseline is room air  Most likely etiology secondary to acute CHF  Patient in no respiratory distress  Patient found on 2 L O2 NC    Patient reports dyspnea worse with exertion    Plan  IV diuresis  Wean O2 as tolerated, keeping saturations > 88%  Monitor respiratory status

## 2022-03-23 NOTE — ASSESSMENT & PLAN NOTE
Wt Readings from Last 3 Encounters:   03/23/22 78 4 kg (172 lb 12 8 oz)   03/11/22 77 2 kg (170 lb 1 6 oz)   03/02/22 77 6 kg (171 lb)     On presentation - Chief complaint shortness of breath, over a week and half, worse with exertion  With associated orthopnea, cough productive of clear sputum, palpitations, and lower extremity swelling  Patient denied chest pain  Patient with extensive cardiac history including severe AS, CAD s/p CABG, severe B/L carotid disease, PAD s/p bypass, beta-blocker induced bradycardia    EKG - normal sinus rhythm, with ST depression in inferolateral leads with T-wave inversion  CXR - vascular congestion with bilateral pleural effusions  Troponin at [facility] 674, at [facility] 588 with delta -86, at [facility] with delta 53  NT proBNP 34,910  Patient's last echocardiogram was on 05/2021 which showed an EF of 60%, and a severe aortic stenosis  Etiology of patient's symptoms likely from acute heart failure secondary to severe AS disease, VS ACS/NSTEMI    While in ED, assessed Cardiology team and initially started on heparin drip due to concerns of ACS  Heparin drip discontinued, as presentation is more suggestive of acute heart failure  On evaluation, diuresing appropriately  Bibasilar crackles improved, lower extremity pitting edema now 1+, and remaining on 2 L O2 NC  Negative JVD      Plan  Telemetry monitoring  Echocardiogram to be performed today  Cardiology consulted, appreciate recommendations  As per Cardiology, Lasix 80 mg b i d   Weights daily and I/O's  Diet cardiovascular 2 g sodium  Continue home ASA and statin  Holding home benazepril and hydralazine in the setting of SHOAIB

## 2022-03-23 NOTE — ASSESSMENT & PLAN NOTE
Lab Results   Component Value Date    HGBA1C 5 6 03/23/2022     Recent Labs     03/22/22  1853 03/22/22  2118 03/23/22  0709 03/23/22  1056   POCGLU 111 161* 142* 166*       Blood Sugar Average: Last 72 hrs:    Home meds include metformin  Diabetes mellitus well controlled  Plan  Holding home oral hypoglycemic med  SSI and POCT a c   And HS  Hypoglycemia protocol  Diabetic diet

## 2022-03-23 NOTE — PLAN OF CARE
Problem: CARDIOVASCULAR - ADULT  Goal: Maintains optimal cardiac output and hemodynamic stability  Description: INTERVENTIONS:  - Monitor I/O, vital signs and rhythm  - Monitor for S/S and trends of decreased cardiac output  - Administer and titrate ordered vasoactive medications to optimize hemodynamic stability  - Assess quality of pulses, skin color and temperature  - Assess for signs of decreased coronary artery perfusion  - Instruct patient to report change in severity of symptoms  Outcome: Progressing  Goal: Absence of cardiac dysrhythmias or at baseline rhythm  Description: INTERVENTIONS:  - Continuous cardiac monitoring, vital signs, obtain 12 lead EKG if ordered  - Administer antiarrhythmic and heart rate control medications as ordered  - Monitor electrolytes and administer replacement therapy as ordered  Outcome: Progressing     Problem: RESPIRATORY - ADULT  Goal: Achieves optimal ventilation and oxygenation  Description: INTERVENTIONS:  - Assess for changes in respiratory status  - Assess for changes in mentation and behavior  - Position to facilitate oxygenation and minimize respiratory effort  - Oxygen administered by appropriate delivery if ordered  - Initiate smoking cessation education as indicated  - Encourage broncho-pulmonary hygiene including cough, deep breathe, Incentive Spirometry  - Assess the need for suctioning and aspirate as needed  - Assess and instruct to report SOB or any respiratory difficulty  - Respiratory Therapy support as indicated  Outcome: Progressing     Problem: METABOLIC, FLUID AND ELECTROLYTES - ADULT  Goal: Electrolytes maintained within normal limits  Description: INTERVENTIONS:  - Monitor labs and assess patient for signs and symptoms of electrolyte imbalances  - Administer electrolyte replacement as ordered  - Monitor response to electrolyte replacements, including repeat lab results as appropriate  - Instruct patient on fluid and nutrition as appropriate  Outcome: Progressing  Goal: Fluid balance maintained  Description: INTERVENTIONS:  - Monitor labs   - Monitor I/O and WT  - Instruct patient on fluid and nutrition as appropriate  - Assess for signs & symptoms of volume excess or deficit  Outcome: Progressing     Problem: SAFETY ADULT  Goal: Patient will remain free of falls  Description: INTERVENTIONS:  - Educate patient/family on patient safety including physical limitations  - Instruct patient to call for assistance with activity   - Consult OT/PT to assist with strengthening/mobility   - Keep Call bell within reach  - Keep bed low and locked with side rails adjusted as appropriate  - Keep care items and personal belongings within reach  - Initiate and maintain comfort rounds  - Make Fall Risk Sign visible to staff  - Offer Toileting every 2 Hours, in advance of need  - Initiate/Maintain bed alarm  - Obtain necessary fall risk management equipment  - Apply yellow socks and bracelet for high fall risk patients  - Consider moving patient to room near nurses station  Outcome: Progressing

## 2022-03-24 ENCOUNTER — APPOINTMENT (INPATIENT)
Dept: NON INVASIVE DIAGNOSTICS | Facility: HOSPITAL | Age: 77
DRG: 291 | End: 2022-03-24
Payer: MEDICARE

## 2022-03-24 LAB
ANION GAP SERPL CALCULATED.3IONS-SCNC: 10 MMOL/L (ref 4–13)
AORTIC ROOT: 3.5 CM
AORTIC VALVE MEAN VELOCITY: 28 M/S
APICAL FOUR CHAMBER EJECTION FRACTION: 44 %
ASCENDING AORTA: 2.9 CM (ref 2–3)
AV AREA BY CONTINUOUS VTI: 0.8 CM2
AV AREA PEAK VELOCITY: 0.9 CM2
AV LVOT MEAN GRADIENT: 1 MMHG
AV LVOT PEAK GRADIENT: 2 MMHG
AV MEAN GRADIENT: 34 MMHG
AV PEAK GRADIENT: 54 MMHG
AV REGURGITATION PRESSURE HALF TIME: 279 MS
AV VALVE AREA: 0.83 CM2
AV VELOCITY RATIO: 0.21
BUN SERPL-MCNC: 55 MG/DL (ref 5–25)
CALCIUM SERPL-MCNC: 8.3 MG/DL (ref 8.3–10.1)
CHLORIDE SERPL-SCNC: 104 MMOL/L (ref 100–108)
CO2 SERPL-SCNC: 25 MMOL/L (ref 21–32)
CREAT SERPL-MCNC: 2.55 MG/DL (ref 0.6–1.3)
DOP CALC AO PEAK VEL: 3.66 M/S
DOP CALC AO VTI: 97.64 CM
DOP CALC LVOT AREA: 4.15 CM2
DOP CALC LVOT DIAMETER: 2.3 CM
DOP CALC LVOT PEAK VEL VTI: 19.62 CM
DOP CALC LVOT PEAK VEL: 0.77 M/S
DOP CALC LVOT STROKE INDEX: 40.7 ML/M2
DOP CALC LVOT STROKE VOLUME: 81.47 CM3
E WAVE DECELERATION TIME: 136 MS
ERYTHROCYTE [DISTWIDTH] IN BLOOD BY AUTOMATED COUNT: 15.6 % (ref 11.6–15.1)
FRACTIONAL SHORTENING: 17 % (ref 28–44)
GFR SERPL CREATININE-BSD FRML MDRD: 22 ML/MIN/1.73SQ M
GLUCOSE SERPL-MCNC: 107 MG/DL (ref 65–140)
GLUCOSE SERPL-MCNC: 126 MG/DL (ref 65–140)
GLUCOSE SERPL-MCNC: 142 MG/DL (ref 65–140)
GLUCOSE SERPL-MCNC: 198 MG/DL (ref 65–140)
GLUCOSE SERPL-MCNC: 202 MG/DL (ref 65–140)
HCT VFR BLD AUTO: 24.3 % (ref 36.5–49.3)
HGB BLD-MCNC: 7.7 G/DL (ref 12–17)
INTERVENTRICULAR SEPTUM IN DIASTOLE (PARASTERNAL SHORT AXIS VIEW): 1.3 CM
INTERVENTRICULAR SEPTUM: 1.3 CM (ref 0.53–0.99)
LAAS-AP2: 28.1 CM2
LAAS-AP4: 25.1 CM2
LEFT ATRIUM SIZE: 4.9 CM
LEFT INTERNAL DIMENSION IN SYSTOLE: 4.5 CM (ref 2.78–4.21)
LEFT VENTRICLE DIASTOLIC VOLUME (MOD BIPLANE): 206 ML (ref 90.88–204.66)
LEFT VENTRICLE SYSTOLIC VOLUME (MOD BIPLANE): 120 ML
LEFT VENTRICULAR INTERNAL DIMENSION IN DIASTOLE: 5.4 CM (ref 4.56–6.79)
LEFT VENTRICULAR POSTERIOR WALL IN END DIASTOLE: 1.2 CM (ref 0.51–0.97)
LEFT VENTRICULAR STROKE VOLUME: 52 ML
LV EF: 42 %
LVSV (TEICH): 52 ML
MCH RBC QN AUTO: 27.9 PG (ref 26.8–34.3)
MCHC RBC AUTO-ENTMCNC: 31.7 G/DL (ref 31.4–37.4)
MCV RBC AUTO: 88 FL (ref 82–98)
MV E'TISSUE VEL-SEP: 5 CM/S
MV PEAK A VEL: 0.84 M/S
MV PEAK E VEL: 118 CM/S
MV STENOSIS PRESSURE HALF TIME: 40 MS
MV VALVE AREA P 1/2 METHOD: 5.5 CM2
PLATELET # BLD AUTO: 236 THOUSANDS/UL (ref 149–390)
PMV BLD AUTO: 10.7 FL (ref 8.9–12.7)
POTASSIUM SERPL-SCNC: 3.6 MMOL/L (ref 3.5–5.3)
RBC # BLD AUTO: 2.76 MILLION/UL (ref 3.88–5.62)
RIGHT ATRIUM AREA SYSTOLE A4C: 19.5 CM2
RIGHT VENTRICLE ID DIMENSION: 4.2 CM
SL CV AV DECELERATION TIME RETROGRADE: 961 MS
SL CV AV PEAK GRADIENT RETROGRADE: 46 MMHG
SL CV LEFT ATRIUM LENGTH A2C: 6.6 CM
SL CV LV DIAS VOL ENDO Z SCORE: 2.05
SL CV LV EF: 50
SL CV PED ECHO LEFT VENTRICLE DIASTOLIC VOLUME (MOD BIPLANE) 2D: 143 ML
SL CV PED ECHO LEFT VENTRICLE SYSTOLIC VOLUME (MOD BIPLANE) 2D: 91 ML
SODIUM SERPL-SCNC: 139 MMOL/L (ref 136–145)
WBC # BLD AUTO: 5.92 THOUSAND/UL (ref 4.31–10.16)
Z-SCORE OF ASCENDING AORTA: 1.59
Z-SCORE OF INTERVENTRICULAR SEPTUM IN END DIASTOLE: 4.61
Z-SCORE OF LEFT VENTRICULAR DIMENSION IN END DIASTOLE: -0.29
Z-SCORE OF LEFT VENTRICULAR DIMENSION IN END SYSTOLE: 2.17
Z-SCORE OF LEFT VENTRICULAR POSTERIOR WALL IN END DIASTOLE: 3.87

## 2022-03-24 PROCEDURE — 80048 BASIC METABOLIC PNL TOTAL CA: CPT | Performed by: NURSE PRACTITIONER

## 2022-03-24 PROCEDURE — 82948 REAGENT STRIP/BLOOD GLUCOSE: CPT

## 2022-03-24 PROCEDURE — 93306 TTE W/DOPPLER COMPLETE: CPT

## 2022-03-24 PROCEDURE — 99232 SBSQ HOSP IP/OBS MODERATE 35: CPT | Performed by: INTERNAL MEDICINE

## 2022-03-24 PROCEDURE — 93306 TTE W/DOPPLER COMPLETE: CPT | Performed by: INTERNAL MEDICINE

## 2022-03-24 PROCEDURE — 85027 COMPLETE CBC AUTOMATED: CPT

## 2022-03-24 RX ORDER — FUROSEMIDE 10 MG/ML
80 INJECTION INTRAMUSCULAR; INTRAVENOUS
Status: DISCONTINUED | OUTPATIENT
Start: 2022-03-24 | End: 2022-03-24

## 2022-03-24 RX ORDER — POTASSIUM CHLORIDE 20 MEQ/1
40 TABLET, EXTENDED RELEASE ORAL ONCE
Status: COMPLETED | OUTPATIENT
Start: 2022-03-24 | End: 2022-03-24

## 2022-03-24 RX ADMIN — ATORVASTATIN CALCIUM 80 MG: 40 TABLET, FILM COATED ORAL at 22:44

## 2022-03-24 RX ADMIN — PANCRELIPASE 24000 UNITS: 24000; 76000; 120000 CAPSULE, DELAYED RELEASE PELLETS ORAL at 09:30

## 2022-03-24 RX ADMIN — DOCUSATE SODIUM 100 MG: 100 CAPSULE ORAL at 17:46

## 2022-03-24 RX ADMIN — INSULIN LISPRO 2 UNITS: 100 INJECTION, SOLUTION INTRAVENOUS; SUBCUTANEOUS at 22:44

## 2022-03-24 RX ADMIN — POTASSIUM CHLORIDE 40 MEQ: 1500 TABLET, EXTENDED RELEASE ORAL at 11:37

## 2022-03-24 RX ADMIN — HEPARIN SODIUM 5000 UNITS: 5000 INJECTION INTRAVENOUS; SUBCUTANEOUS at 14:23

## 2022-03-24 RX ADMIN — PANCRELIPASE 24000 UNITS: 24000; 76000; 120000 CAPSULE, DELAYED RELEASE PELLETS ORAL at 11:37

## 2022-03-24 RX ADMIN — POLYETHYLENE GLYCOL 3350 17 G: 17 POWDER, FOR SOLUTION ORAL at 09:30

## 2022-03-24 RX ADMIN — NICOTINE 1 PATCH: 7 PATCH, EXTENDED RELEASE TRANSDERMAL at 09:28

## 2022-03-24 RX ADMIN — ASPIRIN 81 MG: 81 TABLET, COATED ORAL at 09:28

## 2022-03-24 RX ADMIN — PANTOPRAZOLE SODIUM 40 MG: 40 TABLET, DELAYED RELEASE ORAL at 09:28

## 2022-03-24 RX ADMIN — HEPARIN SODIUM 5000 UNITS: 5000 INJECTION INTRAVENOUS; SUBCUTANEOUS at 05:16

## 2022-03-24 RX ADMIN — DOCUSATE SODIUM 100 MG: 100 CAPSULE ORAL at 09:28

## 2022-03-24 RX ADMIN — IRON SUCROSE 300 MG: 20 INJECTION, SOLUTION INTRAVENOUS at 09:30

## 2022-03-24 RX ADMIN — PANCRELIPASE 24000 UNITS: 24000; 76000; 120000 CAPSULE, DELAYED RELEASE PELLETS ORAL at 17:46

## 2022-03-24 RX ADMIN — HEPARIN SODIUM 5000 UNITS: 5000 INJECTION INTRAVENOUS; SUBCUTANEOUS at 22:44

## 2022-03-24 RX ADMIN — FUROSEMIDE 80 MG: 10 INJECTION, SOLUTION INTRAMUSCULAR; INTRAVENOUS at 09:29

## 2022-03-24 RX ADMIN — INSULIN LISPRO 2 UNITS: 100 INJECTION, SOLUTION INTRAVENOUS; SUBCUTANEOUS at 11:38

## 2022-03-24 NOTE — ASSESSMENT & PLAN NOTE
Lab Results   Component Value Date    HGBA1C 5 6 03/23/2022     Recent Labs     03/24/22  1648 03/24/22 2028 03/25/22  0825 03/25/22  1105   POCGLU 107 198* 121 221*     Blood Sugar Average: Last 72 hrs:    Home meds include metformin  Diabetes mellitus well controlled      Plan  Continue home oral hypoglycemic med  Carb controlled diet at discharge

## 2022-03-24 NOTE — ASSESSMENT & PLAN NOTE
On presentation - Chief complaint shortness of breath, over a week and half, worse with exertion  With associated orthopnea, cough productive of clear sputum, palpitations, and lower extremity swelling  Patient denied chest pain  Patient with extensive cardiac history including severe AS, CAD s/p CABG, severe B/L carotid disease, PAD s/p bypass, beta-blocker induced bradycardia    EKG - normal sinus rhythm, with ST depression in inferolateral leads with T-wave inversion  CXR - vascular congestion with bilateral pleural effusions  Troponin at [facility] 674, at [facility] 588 with delta -86, at [facility] with delta 53  NT proBNP 34,910  Etiology of patient's symptoms likely from acute heart failure secondary to severe AS disease, VS ACS/NSTEMI  Echo - LVEF 47%, systolic function low normal   Grade 2 DD  Mild concentric hypertrophy  Moderate hypokinesis  Moderate MR  AR severe stenosis redemonstrated  Dilation of IVC  On evaluation, diuresing appropriately  Bibasilar crackles improved, lower extremity pitting edema now trace, and remaining on 2 L O2 NC  Negative JVD  Ambulatory pulse ox on room air satting 100%  Plan  Cardiology consulted, appreciate recommendations  At discharge torsemide 60 mg daily  Weights daily, Diet cardiovascular 2 g sodium at home  Continue home ASA and statin  Continuing home amlodipine  Discontinue home benazepril and hydralazine at discharge, as per Cardiology  To reassess continuation at outpatient follow-up  Outpatient follow-up with Cardiology

## 2022-03-24 NOTE — PROGRESS NOTES
General Cardiology   Progress Note -  [facility] One   Anay Yin  76 y o  male MRN: [id number]  Unit/Bed#: S -01 Encounter: [id number]      Assessment  1  Non MI troponin elevation- 1st troponin 674, 2 hr troponin 588 (negative delta)   ECG shows NSR with LVH and repolarization abnormalities   No complaints of chest pain but does note dyspnea on exertion likely in setting of AS and CHF  2  Acute diastolic CHF- CXR with vascular congestion and small right effusion, on 2 L nasal cannula, NT proBNP 48991  Remains overloaded on exam today  Updated echo shows LVEF 50% with grade II DD, moderate hypokinesis of basal-mid inferior wall; severe AS and moderate MR, dilated IVC  3  Severe AS- Echo today shows severe AS with LUIS 0 83 cm2, MG 34 mmHg  4  Acute hypoxic respiratory failure- does not wear home O2, but currently requiring 2 L nasal cannula due to acute CHF  Wean O2 as able with diuresis  5  SHOAIB on CKD- creatinine stable with diuresis at 2 55 but above previous baseline  SHOAIB in setting of acute CHF/volume overload   Previous baseline 1 6-1 8; recently established with Dr Tate Morris  May need to accept higher creatinine to maintain euvolemia  6  CAD s/p CABG x2 September 2013- no c/o chest pain  7  Hypertension- average /58  8  Dyslipidemia- lipid panel from June 2021-cholesterol 85, triglycerides 90, HDL 31, LDL 36  On atorvastatin 80 mg daily  9  Bilateral carotid stenosis, PAD s/p LLE revascularization  10  Chronic anemia- hgb 7 7 today, getting IV iron per primary team   11  Tobacco abuse-smoking 4-5 cigarettes per day, trying to cut back      Plan  Continue Lasix 80 mg IV BID  Wean O2 as able  AM BMP  Continue strict I/Os, daily standing weights  BP stable on amlodipine and IV Lasix  Continue aspirin and statin  Encouraged smoking cessation  Will arrange sooner follow up with CT surgery    Subjective  He is no longer orthopneic and his swelling is better    Does still note dyspnea on exertion but dizziness is improved  Review of Systems   Constitutional: Negative for chills and malaise/fatigue  Cardiovascular: Positive for dyspnea on exertion  Negative for chest pain, leg swelling, orthopnea, palpitations and syncope  Respiratory: Positive for cough  Negative for sleep disturbances due to breathing and sputum production  Gastrointestinal: Negative for bloating, nausea and vomiting  Neurological: Negative for dizziness, light-headedness and weakness  Psychiatric/Behavioral: Negative for altered mental status  All other systems reviewed and are negative  Objective:   Vitals: Blood pressure 106/60, pulse 75, temperature 97 6 °F (36 4 °C), temperature source Oral, resp  rate 16, height 5' 10" (1 778 m), weight 76 2 kg (168 lb), SpO2 92 %  , Body mass index is 24 11 kg/m²  ,     Systolic (08QOC), DCU:603 , Min:106 , FRX:195     Diastolic (17BQF), WRE:50, Min:53, Max:60    Intake/Output Summary (Last 24 hours) at 3/24/2022 1104  Last data filed at 3/24/2022 0942  Gross per 24 hour   Intake 540 ml   Output 2400 ml   Net -1860 ml     Wt Readings from Last 3 Encounters:   03/24/22 76 2 kg (168 lb)   03/11/22 77 2 kg (170 lb 1 6 oz)   03/02/22 77 6 kg (171 lb)     Telemetry Review: NSR with PACs, sinus bradycardia with PACs this morning  Rates in the 40s-50s  Physical Exam  Vitals reviewed  Constitutional:       General: He is not in acute distress  Comments: Chronically ill-appearing   Neck:      Vascular: No hepatojugular reflux or JVD  Cardiovascular:      Rate and Rhythm: Regular rhythm  Bradycardia present  Heart sounds: Murmur heard  Systolic murmur is present  No friction rub  No gallop  Pulmonary:      Effort: No respiratory distress  Breath sounds: Rales (RLL) present  Abdominal:      General: Bowel sounds are normal  There is no distension  Palpations: Abdomen is soft  Tenderness: There is no abdominal tenderness     Musculoskeletal:         General: No tenderness  Normal range of motion  Cervical back: Neck supple  Right lower le+ Pitting Edema present  Left lower le+ Pitting Edema present  Skin:     General: Skin is warm and dry  Findings: No erythema  Neurological:      Mental Status: He is alert and oriented to person, place, and time     Psychiatric:         Mood and Affect: Mood normal      LABORATORY RESULTS      CBC with diff:   Results from last 7 days   Lab Units 22  0550 22  0510 22  1347   WBC Thousand/uL 5 92 6 00 6 32   HEMOGLOBIN g/dL 7 7* 8 3* 8 5*   HEMATOCRIT % 24 3* 25 3* 26 8*   MCV fL 88 85 89   PLATELETS Thousands/uL 236 230 256   MCH pg 27 9 27 8 28 3   MCHC g/dL 31 7 32 8 31 7   RDW % 15 6* 15 4* 15 5*   MPV fL 10 7 10 5 10 7   NRBC AUTO /100 WBCs  --   --  0     CMP:  Results from last 7 days   Lab Units 22  0550 22  0510 22  1347   POTASSIUM mmol/L 3 6 4 4 5 2   CHLORIDE mmol/L 104 105 106   CO2 mmol/L 25 24 23   BUN mg/dL 55* 51* 46*   CREATININE mg/dL 2 55* 2 42* 2 67*   CALCIUM mg/dL 8 3 8 7 8 9   AST U/L  --   --  18   ALT U/L  --   --  23   ALK PHOS U/L  --   --  68   EGFR ml/min/1 73sq m      BMP:  Results from last 7 days   Lab Units 22  0550 22  0510 22  1347   POTASSIUM mmol/L 3 6 4 4 5 2   CHLORIDE mmol/L 104 105 106   CO2 mmol/L 25 24 23   BUN mg/dL 55* 51* 46*   CREATININE mg/dL 2 55* 2 42* 2 67*   CALCIUM mg/dL 8 3 8 7 8 9     Lab Results   Component Value Date    CREATININE 2 55 (H) 2022    CREATININE 2 42 (H) 2022    CREATININE 2 67 (H) 2022     Lab Results   Component Value Date    NTBNP 34,910 (H) 2022       Results from last 7 days   Lab Units 22  0510   MAGNESIUM mg/dL 2 2     Results from last 7 days   Lab Units 22  0510   HEMOGLOBIN A1C % 5 6     Results from last 7 days   Lab Units 22  1347   INR  1 11     Lipid Profile:   No results found for: CHOL  Lab Results   Component Value Date    HDL 31 (L) 2021    HDL 39 (L) 09/15/2020    HDL 36 (L) 2020     Lab Results   Component Value Date    LDLCALC 36 2021    LDLCALC 36 09/15/2020    LDLCALC 52 2020     Lab Results   Component Value Date    TRIG 90 2021    TRIG 85 09/15/2020    TRIG 81 2020     Cardiac testing:   Results for orders placed during the hospital encounter of 21    Echo complete with contrast if indicated    Narrative  [facility], [address] [facility]  [phone]    Transthoracic Echocardiogram  2D, M-mode, Doppler, and Color Doppler    Study date:  06-May-2021    Patient: Hillary Ramsey  MR number: [de-identified]  Account number: [de-identified]  : 1945  Age: 64 years  Gender: Male  Status: Outpatient  Location: [place] [address]   Height: 70 in  Weight: 181 lb  BP: 150/ 58 mmHg    Indications: Assess the aortic valve  Diagnoses: I35 0 - Nonrheumatic aortic (valve) stenosis    Sonographer:  Juanjo Moody RDCS  Primary Physician:  Klever Rao MD  Referring Physician:  Rona Driver MD  Group:  [facility]  Interpreting Physician:  Lyndsey Torres MD    SUMMARY    LEFT VENTRICLE:  Systolic function was normal  Ejection fraction was estimated to be 60 %  There were no regional wall motion abnormalities  Wall thickness was mildly increased  Doppler parameters were consistent with abnormal left ventricular relaxation (grade 1 diastolic dysfunction)  LEFT ATRIUM:  The atrium was mildly dilated  MITRAL VALVE:  There was mild regurgitation  AORTIC VALVE:  The valve was probably trileaflet  Leaflets exhibited moderately increased thickness and moderate calcification  There was severe stenosis  There was trace regurgitation  Valve peak gradient was 63 mmHg  Valve mean gradient was 32 mmHg  Aortic valve area was 1 cmï¾² by the continuity equation  Estimated aortic valve area (by VTI) was 0 91 cmï¾²      TRICUSPID VALVE:  There was trace regurgitation  HISTORY: PRIOR HISTORY: CAD, bradycardia, DM, dyslipidemia, PAD, CABG,    PROCEDURE: The study was performed in the [place] [address]  This was a routine study  The transthoracic approach was used  The study included complete 2D imaging, M-mode, complete spectral Doppler, and color Doppler  The heart rate was  66 bpm, at the start of the study  Images were obtained from the parasternal, apical, subcostal, and suprasternal notch acoustic windows  Image quality was adequate  LEFT VENTRICLE: Size was normal  Systolic function was normal  Ejection fraction was estimated to be 60 %  There were no regional wall motion abnormalities  Wall thickness was mildly increased  DOPPLER: Doppler parameters were consistent  with abnormal left ventricular relaxation (grade 1 diastolic dysfunction)  RIGHT VENTRICLE: The size was normal  Systolic function was normal  Wall thickness was normal     LEFT ATRIUM: The atrium was mildly dilated  RIGHT ATRIUM: Size was normal     MITRAL VALVE: Valve structure was normal  There was normal leaflet separation  DOPPLER: The transmitral velocity was within the normal range  There was no evidence for stenosis  There was mild regurgitation  AORTIC VALVE: The valve was probably trileaflet  Leaflets exhibited moderately increased thickness and moderate calcification  DOPPLER: There was severe stenosis  There was trace regurgitation  TRICUSPID VALVE: The valve structure was normal  There was normal leaflet separation  DOPPLER: The transtricuspid velocity was within the normal range  There was no evidence for stenosis  There was trace regurgitation  PULMONIC VALVE: Leaflets exhibited normal thickness, no calcification, and normal cuspal separation  DOPPLER: The transpulmonic velocity was within the normal range  There was no significant regurgitation  PERICARDIUM: There was no pericardial effusion   The pericardium was normal in appearance  AORTA: The root exhibited normal size  SYSTEMIC VEINS: IVC: The inferior vena cava was normal in size  MEASUREMENT TABLES    2D MEASUREMENTS  LVOT   (Reference normals)  Diam   23 mm   (--)    DOPPLER MEASUREMENTS  LVOT   (Reference normals)  VTI   24 cm   (--)  R-R interval   1017 ms   (--)  HR   59 bpm   (--)  Stroke vol   99 71 ml   (--)  Cardiac output   5 88 L/min   (--)  Cardiac index   2 94 L/min/mï¾²   (--)  Aortic valve   (Reference normals)  VTI   108 cm   (--)  Peak gradient   63 mmHg   (--)  Mean gradient   32 mmHg   (--)  Valve area, cont   1 cmï¾²   (--)  Obstr index, VTI   0 22    (--)  Valve area, VTI   0 91 cmï¾²   (--)  Area index, VTI   0 46 cmï¾²/mï¾²   (--)    SYSTEM MEASUREMENT TABLES    2D  %FS: 23 52 %  Ao Diam: 3 06 cm  EDV(Teich): 137 67 ml  EF(Teich): 46 6 %  ESV(Teich): 73 51 ml  IVSd: 1 26 cm  LA Area: 24 18 cm2  LA Diam: 4 19 cm  LVEDV MOD A4C: 198 32 ml  LVEF MOD A4C: 54 08 %  LVESV MOD A4C: 91 07 ml  LVIDd: 5 34 cm  LVIDs: 4 08 cm  LVLd A4C: 10 15 cm  LVLs A4C: 8 98 cm  LVOT Diam: 2 43 cm  LVPWd: 1 18 cm  RA Area: 14 06 cm2  RVIDd: 3 75 cm  SV MOD A4C: 107 24 ml  SV(Teich): 64 16 ml    CW  AV Env  Ti: 414 84 ms  AV MaxP 54 mmHg  AV VTI: 103 91 cm  AV Vmax: 3 82 m/s  AV Vmean: 2 5 m/s  AV meanP 08 mmHg    MM  TAPSE: 2 13 cm    PW  LUIS (VTI): 1 06 cm2  LUIS Vmax: 1 04 cm2  E' Sept: 0 04 m/s  E/E' Sept: 22 83  LVOT Env  Ti: 482 08 ms  LVOT VTI: 23 7 cm  LVOT Vmax: 0 86 m/s  LVOT Vmean: 0 5 m/s  LVOT maxP 94 mmHg  LVOT meanP 25 mmHg  LVSV Dopp: 110 25 ml  MV A Pop: 1 23 m/s  MV Dec [place]: 5 05 m/s2  MV DecT: 180 67 ms  MV E Pop: 0 91 m/s  MV E/A Ratio: 0 74  MV PHT: 52 39 ms  MVA By PHT: 4 2 cm2    [facility] Accredited Echocardiography Laboratory    Prepared and electronically signed by    Ovidio Carrillo MD  Signed 06-May-2021 16:40:31    Meds/Allergies   all current active meds have been reviewed and current meds:   Current Facility-Administered Medications   Medication Dose Route Frequency    acetaminophen (TYLENOL) tablet 650 mg  650 mg Oral Q6H PRN    amLODIPine (NORVASC) tablet 10 mg  10 mg Oral Daily    aspirin (ECOTRIN LOW STRENGTH) EC tablet 81 mg  81 mg Oral Daily    atorvastatin (LIPITOR) tablet 80 mg  80 mg Oral HS    docusate sodium (COLACE) capsule 100 mg  100 mg Oral BID    furosemide (LASIX) injection 80 mg  80 mg Intravenous BID (diuretic)    heparin (porcine) subcutaneous injection 5,000 Units  5,000 Units Subcutaneous Q8H [facility]    insulin lispro (HumaLOG) 100 units/mL subcutaneous injection 1-6 Units  1-6 Units Subcutaneous 4x Daily (AC & HS)    nicotine (NICODERM CQ) 7 mg/24hr TD 24 hr patch 1 patch  1 patch Transdermal Daily    ondansetron (ZOFRAN) injection 4 mg  4 mg Intravenous Q6H PRN    pancrelipase (Lip-Prot-Amyl) (CREON) delayed release capsule 24,000 Units  24,000 Units Oral TID With Meals    pantoprazole (PROTONIX) EC tablet 40 mg  40 mg Oral Daily    polyethylene glycol (MIRALAX) packet 17 g  17 g Oral Daily    potassium chloride (K-DUR,KLOR-CON) CR tablet 40 mEq  40 mEq Oral Once     Medications Prior to Admission   Medication    amLODIPine (NORVASC) 10 mg tablet    AMYLASE-LIPASE-PROTEASE PO    aspirin (ECOTRIN LOW STRENGTH) 81 mg EC tablet    atorvastatin (LIPITOR) 80 mg tablet    benazepril (LOTENSIN) 40 MG tablet    clopidogrel (PLAVIX) 75 mg tablet    Cyanocobalamin (VITAMIN B12 PO)    hydrALAZINE (APRESOLINE) 50 mg tablet    Magnesium Oxide (MAG-200 PO)    metFORMIN (GLUCOPHAGE) 500 mg tablet    Multiple Vitamin (MULTIVITAMIN) tablet    pantoprazole (PROTONIX) 40 mg tablet    Ferrous Sulfate Dried (Feosol) 200 (65 Fe) MG TABS    Fluad Quadrivalent 0 5 ML PRSY     Counseling / Coordination of Care  Total floor / unit time spent today 20 minutes  Greater than 50% of total time was spent with the patient and / or family counseling and / or coordination of care        ** Please Note: Dragon 360 Dictation voice to text software may have been used in the creation of this document   **

## 2022-03-24 NOTE — PLAN OF CARE
Problem: CARDIOVASCULAR - ADULT  Goal: Maintains optimal cardiac output and hemodynamic stability  Description: INTERVENTIONS:  - Monitor I/O, vital signs and rhythm  - Monitor for S/S and trends of decreased cardiac output  - Administer and titrate ordered vasoactive medications to optimize hemodynamic stability  - Assess quality of pulses, skin color and temperature  - Assess for signs of decreased coronary artery perfusion  - Instruct patient to report change in severity of symptoms  Outcome: Progressing  Goal: Absence of cardiac dysrhythmias or at baseline rhythm  Description: INTERVENTIONS:  - Continuous cardiac monitoring, vital signs, obtain 12 lead EKG if ordered  - Administer antiarrhythmic and heart rate control medications as ordered  - Monitor electrolytes and administer replacement therapy as ordered  Outcome: Progressing     Problem: RESPIRATORY - ADULT  Goal: Achieves optimal ventilation and oxygenation  Description: INTERVENTIONS:  - Assess for changes in respiratory status  - Assess for changes in mentation and behavior  - Position to facilitate oxygenation and minimize respiratory effort  - Oxygen administered by appropriate delivery if ordered  - Initiate smoking cessation education as indicated  - Encourage broncho-pulmonary hygiene including cough, deep breathe, Incentive Spirometry  - Assess the need for suctioning and aspirate as needed  - Assess and instruct to report SOB or any respiratory difficulty  - Respiratory Therapy support as indicated  Outcome: Progressing     Problem: METABOLIC, FLUID AND ELECTROLYTES - ADULT  Goal: Electrolytes maintained within normal limits  Description: INTERVENTIONS:  - Monitor labs and assess patient for signs and symptoms of electrolyte imbalances  - Administer electrolyte replacement as ordered  - Monitor response to electrolyte replacements, including repeat lab results as appropriate  - Instruct patient on fluid and nutrition as appropriate  Outcome: Progressing  Goal: Fluid balance maintained  Description: INTERVENTIONS:  - Monitor labs   - Monitor I/O and WT  - Instruct patient on fluid and nutrition as appropriate  - Assess for signs & symptoms of volume excess or deficit  Outcome: Progressing     Problem: Potential for Falls  Goal: Patient will remain free of falls  Description: INTERVENTIONS:  - Educate patient/family on patient safety including physical limitations  - Instruct patient to call for assistance with activity   - Consult OT/PT to assist with strengthening/mobility   - Keep Call bell within reach  - Keep bed low and locked with side rails adjusted as appropriate  - Keep care items and personal belongings within reach  - Initiate and maintain comfort rounds  - Make Fall Risk Sign visible to staff  - Offer Toileting every 2 Hours, in advance of need  - Initiate/Maintain bed alarm  - Obtain necessary fall risk management equipment  - Apply yellow socks and bracelet for high fall risk patients  - Consider moving patient to room near nurses station  Outcome: Progressing

## 2022-03-24 NOTE — ASSESSMENT & PLAN NOTE
Per chart review, documentation suggests 1st noted in 2015 on echocardiogram   Aortic valve stenosis noted as severe on 05/2021  Patient follows up with outpatient Cardiology Dr Russo Found  At last follow-up with Cardiology, patient considering TAVR and was to follow-up with CT surgery  Plan  As above in "acute CHF"  Cm arranging sooner follow-up with CT surgery for TAVR    Continue outpatient follow-up with Cardiology

## 2022-03-24 NOTE — ASSESSMENT & PLAN NOTE
POA, patient requiring 2 L O2 NC  Baseline is room air  Most likely etiology secondary to acute CHF/volume overload  Patient in no respiratory distress  Patient on 2 L O2 NC  Patient reports dyspnea worse with exertion  On exam, volume overloaded with crackles bibasilar  Ambulatory pulse ox satting 100% on room air    Plan  P o   Diuretics

## 2022-03-24 NOTE — PROGRESS NOTES
[facility]  Progress Note - Haley Bains  1945, 76 y o  male MRN: [id number]  Unit/Bed#: S -01 Encounter: [id number]  Primary Care Provider: Jabari Yap MD   Date and time admitted to hospital: 3/22/2022  1:22 PM    * Acute CHF (congestive heart failure) ([facility] [address] )  Assessment & Plan  Wt Readings from Last 3 Encounters:   03/24/22 76 2 kg (168 lb)   03/11/22 77 2 kg (170 lb 1 6 oz)   03/02/22 77 6 kg (171 lb)     On presentation - Chief complaint shortness of breath, over a week and half, worse with exertion  With associated orthopnea, cough productive of clear sputum, palpitations, and lower extremity swelling  Patient denied chest pain  Patient with extensive cardiac history including severe AS, CAD s/p CABG, severe B/L carotid disease, PAD s/p bypass, beta-blocker induced bradycardia    EKG - normal sinus rhythm, with ST depression in inferolateral leads with T-wave inversion  CXR - vascular congestion with bilateral pleural effusions  Troponin at [facility] 674, at [facility] 588 with delta -86, at [facility] with delta 53  NT proBNP 34,910  Patient's last echocardiogram was on 05/2021 which showed an EF of 60%, and a severe aortic stenosis  Etiology of patient's symptoms likely from acute heart failure secondary to severe AS disease, VS ACS/NSTEMI  Echo - LVEF 86%, systolic function low normal   Grade 2 DD  Mild concentric hypertrophy  Moderate hypokinesis  Moderate MR  AR severe stenosis redemonstrated  Dilation of IVC  On evaluation, diuresing appropriately  Bibasilar crackles improved, lower extremity pitting edema now trace, and remaining on 2 L O2 NC  Negative JVD      Plan  Telemetry monitoring  Cardiology consulted, appreciate recommendations  As per Cardiology, Lasix 80 mg b i d   X1 40 mEq potassium  Weights daily and I/O's  Diet cardiovascular 2 g sodium  Continue home ASA and statin  Holding home benazepril and hydralazine in the setting of SHOAIB    Nonrheumatic aortic valve stenosis  Assessment & Plan  Per chart review, documentation suggests 1st noted in 2015 on echocardiogram   Aortic valve stenosis noted as severe on 05/2021  Patient follows up with outpatient Cardiology Dr Shikha Gurrola  At last follow-up with Cardiology, patient considering TAVR and was to follow-up with CT surgery  Plan  As above in "acute CHF"  Cm arranging sooner follow-up with CT surgery for TAVR  Continue outpatient follow-up with Cardiology    Acute respiratory failure with hypoxia [facility])  Assessment & Plan  POA, patient requiring 2 L O2 NC  Baseline is room air  Most likely etiology secondary to acute CHF  Patient in no respiratory distress  Patient found on 2 L O2 NC  Patient reports dyspnea worse with exertion    Plan  IV diuresis  Wean O2 as tolerated, keeping saturations > 88%  Monitor respiratory status    Acute kidney injury superimposed on chronic kidney disease [facility])  Assessment & Plan  Lab Results   Component Value Date    EGFR 22 03/24/2022    EGFR 24 03/23/2022    EGFR 21 03/22/2022    CREATININE 2 55 (H) 03/24/2022    CREATININE 2 42 (H) 03/23/2022    CREATININE 2 67 (H) 03/22/2022   POA, 2 67  Baseline is 1 6-1 8  Likely in the setting of volume overload/CHF most likely, use of nephrotoxic meds, less likely poor oral intake of fluids  Patient follows up with outpatient Nephrology Dr Staci Monroy    Improved on diuresis from POA  Plan  Diuresis - Lasix 80 mg IV b i d  Avoid nephrotoxins - holding home benazepril, hydralazine  Avoid hypotension  Monitor I/O's, daily weights    Smoker  Assessment & Plan  Patient reports smoking 45 cigarettes per day  Has been attempting to cut down on smoking  Plan  Counseled on smoking cessation    Iron deficiency anemia  Assessment & Plan  Iron panel from 01/2022 showed low iron and low iron saturation  POA, hemoglobin 8 5  Prior to admission most recent hemoglobin was 9 7  No signs of bleeding      Plan  IV Venofer x3 doses  A m  CBC    Type 2 diabetes mellitus, without long-term current use of insulin [facility])  Assessment & Plan  Lab Results   Component Value Date    HGBA1C 5 6 2022     Recent Labs     22  1535 22  2131 22  0710 22  1056   POCGLU 149* 146* 142* 202*     Blood Sugar Average: Last 72 hrs:    Home meds include metformin  Diabetes mellitus well controlled  Plan  Holding home oral hypoglycemic med  SSI and POCT a c  And HS  Hypoglycemia protocol  Diabetic diet    Hypertension  Assessment & Plan  Home meds include amlodipine 10 mg daily, benazepril 40 mg daily, and hydralazine 50 mg t i d   Patient not on diuretics at home  BP acceptable    Plan  Hold home benazepril, hydralazine in the setting of SHOAIB  Continue amlodipine 10 mg daily  Monitor BP closely      VTE Pharmacologic Prophylaxis:   VTE Score: 5 High Risk (Score >/= 5) - Pharmacological DVT Prophylaxis Ordered: Heparin  Sequential Compression Devices Ordered  Mechanical VTE Prophylaxis in Place: Yes    Patient Centered Rounds: I have performed bedside rounds with nursing staff today  Discussions with Specialists or Other Care Team Provider:  Cardiology    Education and Discussions with Family / Patient: Attempted to update  (son) via phone  Unable to contact  Unable to leave a voice message  Voice message mailbox full    Current Length of Stay: 2 day(s)    Current Patient Status: Inpatient     Discharge Plan / Estimated Discharge Date: Anticipate discharge in 24-48 hours to home  Code Status: Level 1 - Full Code      Subjective:   Nursing team reported no overnight events  Patient improvement in dyspnea and still notices worsening with exertion  Patient did deny chest pain, cough, palpitations, abdominal pain nausea, vomiting  Patient found sitting watching TV on sofa, in no distress on 2 L O2 NC      Objective:     Vitals:   Temp (24hrs), Av 7 °F (36 5 °C), Min:97 6 °F (36 4 °C), Max:97 9 °F (36 6 °C)    Temp:  [97 6 °F (36 4 °C)-97 9 °F (36 6 °C)] 97 6 °F (36 4 °C)  HR:  [61-75] 75  Resp:  [16-20] 16  BP: (106-124)/(53-60) 106/60  SpO2:  [92 %-93 %] 92 %  Body mass index is 24 11 kg/m²  Input and Output Summary (last 24 hours): Intake/Output Summary (Last 24 hours) at 3/24/2022 1344  Last data filed at 3/24/2022 1330  Gross per 24 hour   Intake 540 ml   Output 1975 ml   Net -1435 ml       Physical Exam:     Physical Exam  Vitals and nursing note reviewed  Constitutional:       General: He is not in acute distress  Appearance: Normal appearance  He is not ill-appearing  HENT:      Head: Normocephalic and atraumatic  Mouth/Throat:      Mouth: Mucous membranes are moist       Pharynx: Oropharynx is clear  Eyes:      General: No scleral icterus  Conjunctiva/sclera: Conjunctivae normal    Neck:      Vascular: No JVD  Cardiovascular:      Rate and Rhythm: Normal rate and regular rhythm  Pulses: Normal pulses  Heart sounds: No murmur heard  No gallop  Pulmonary:      Effort: Pulmonary effort is normal  No respiratory distress  Breath sounds: Rales (Bibasilar) present  No wheezing  Abdominal:      General: Bowel sounds are normal  There is no distension  Palpations: Abdomen is soft  There is no mass  Tenderness: There is no abdominal tenderness  Musculoskeletal:         General: No tenderness  Normal range of motion  Cervical back: Normal range of motion and neck supple  No tenderness  Right lower leg: Pitting Edema (Trace B/L) present  Left lower leg: Pitting Edema present  Skin:     General: Skin is warm and dry  Capillary Refill: Capillary refill takes less than 2 seconds  Neurological:      General: No focal deficit present  Mental Status: He is alert and oriented to person, place, and time  Mental status is at baseline  Sensory: No sensory deficit     Psychiatric:         Mood and Affect: Mood normal  Behavior: Behavior normal          Thought Content: Thought content normal          Judgment: Judgment normal           Additional Data:     Labs:  Results from last 7 days   Lab Units 03/24/22  0550 03/23/22  0510 03/22/22  1347   WBC Thousand/uL 5 92   < > 6 32   HEMOGLOBIN g/dL 7 7*   < > 8 5*   HEMATOCRIT % 24 3*   < > 26 8*   PLATELETS Thousands/uL 236   < > 256   NEUTROS PCT %  --   --  76*   LYMPHS PCT %  --   --  14   MONOS PCT %  --   --  9   EOS PCT %  --   --  0    < > = values in this interval not displayed  Results from last 7 days   Lab Units 03/24/22  0550 03/23/22  0510 03/22/22  1347   SODIUM mmol/L 139   < > 141   POTASSIUM mmol/L 3 6   < > 5 2   CHLORIDE mmol/L 104   < > 106   CO2 mmol/L 25   < > 23   BUN mg/dL 55*   < > 46*   CREATININE mg/dL 2 55*   < > 2 67*   ANION GAP mmol/L 10   < > 12   CALCIUM mg/dL 8 3   < > 8 9   ALBUMIN g/dL  --   --  3 4*   TOTAL BILIRUBIN mg/dL  --   --  0 36   ALK PHOS U/L  --   --  68   ALT U/L  --   --  23   AST U/L  --   --  18   GLUCOSE RANDOM mg/dL 126   < > 101    < > = values in this interval not displayed       Results from last 7 days   Lab Units 03/22/22  1347   INR  1 11     Results from last 7 days   Lab Units 03/24/22  1056 03/24/22  0710 03/23/22  2131 03/23/22  1535 03/23/22  1056 03/23/22  0709 03/22/22  2118 03/22/22  1853   POC GLUCOSE mg/dl 202* 142* 146* 149* 166* 142* 161* 111     Results from last 7 days   Lab Units 03/23/22  0510   HEMOGLOBIN A1C % 5 6           Imaging: Reviewed radiology reports from this admission including: chest xray    Recent Cultures (last 7 days):           Lines/Drains:  Invasive Devices  Report    Peripheral Intravenous Line            Peripheral IV 03/22/22 Left Antecubital 1 day                Telemetry:   Telemetry Orders (From admission, onward)             48 Hour Telemetry Monitoring  (ED Bridging Orders Panel)  Continuous x 48 hours        References:    Telemetry Guidelines   Question:  Reason for 48 Hour Telemetry  Answer:  Arrhythmias Requiring Medical Therapy (eg  SVT, Vtach/fib, Bradycardia, Uncontrolled A-fib)                    Last 24 Hours Medication List:   Current Facility-Administered Medications   Medication Dose Route Frequency Provider Last Rate    acetaminophen  650 mg Oral Q6H PRN Geraldine Sanabria MD      amLODIPine  10 mg Oral Daily Geraldine Sanabria MD      aspirin  81 mg Oral Daily Geraldine Sanabria MD      atorvastatin  80 mg Oral HS Geraldine Sanabria MD      docusate sodium  100 mg Oral BID Geraldine Sanabria MD      furosemide  80 mg Intravenous BID (diuretic) ZHANNA Michael      heparin (porcine)  5,000 Units Subcutaneous [facility] Geraldine Sanabria MD      insulin lispro  1-6 Units Subcutaneous 4x Daily (AC & HS) Geraldine Sanabria MD      nicotine  1 patch Transdermal Daily Geraldine Sanabria MD      ondansetron  4 mg Intravenous Q6H PRN Geraldine Sanabria MD      pancrelipase (Lip-Prot-Amyl)  24,000 Units Oral TID With Meals Geraldine Sanabria MD      pantoprazole  40 mg Oral Daily Geraldine Sanabria MD      polyethylene glycol  17 g Oral Daily Geraldine Sanabria MD          Today, Patient Was Seen By: Geraldine Sanabria MD    ** Please Note: This note has been constructed using a voice recognition system   **

## 2022-03-24 NOTE — CASE MANAGEMENT
Case Management Assessment & Discharge Planning Note    Patient name Una Watson    Location S /S -01 MRN [id number]  : 1945 Date 3/24/2022       Current Admission Date: 3/22/2022  Current Admission Diagnosis:Acute CHF (congestive heart failure) [facility])   Patient Active Problem List    Diagnosis Date Noted    Acute respiratory failure with hypoxia ([facility] ) 2022    Acute CHF (congestive heart failure) ([facility] ) 2022    Acute kidney injury superimposed on chronic kidney disease ([facility] ) 2022    Iron deficiency anemia 2022    Smoker 2022    Persistent proteinuria, unspecified 01/10/2022    Anemia due to stage 4 chronic kidney disease ([facility] ) 01/10/2022    Leukopenia 01/10/2022    Hypomagnesemia 01/10/2022    Renal cyst, left 01/10/2022    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 ([facility] ) 09/10/2021    GERD (gastroesophageal reflux disease)     Hyperlipidemia     Benign hypertension with chronic kidney disease, stage III ([facility] ) 2021    Type 2 diabetes mellitus, without long-term current use of insulin ([facility] ) 2021    Sinus bradycardia 10/08/2020    Nonrheumatic aortic valve stenosis 2019    Calcific tendinitis of right shoulder region 2019    Right shoulder pain 2019    Cigarette nicotine dependence with nicotine-induced disorder 10/22/2018    Stenosis of noncoronary bypass graft ([facility] ) 2016    DM (diabetes mellitus) with peripheral vascular complication ([facility] )     Asymptomatic bilateral carotid artery stenosis 2016    S/P CABG (coronary artery bypass graft) 2016    Hypertension 2016    Aorto-iliac disease ([facility] ) 2016    Renal artery stenosis, native, bilateral ([facility] ) 2016    Dyslipidemia 2016    CAD (coronary artery disease) 2016    Progressive angina ([facility] ) 2016    Atherosclerosis of native arteries of extremities with intermittent claudication, bilateral legs (HCC)     PAD (peripheral artery disease) (HCC)     Tension headache 10/24/2013      LOS (days): 2  Geometric Mean LOS (GMLOS) (days): 3 80  Days to GMLOS:2     OBJECTIVE:    Risk of Unplanned Readmission Score: 17         Current admission status: Inpatient       Preferred Pharmacy:   [facility]  [address]  Phone: [phone] Fax: [phone]    Primary Care Provider: Jessika Szymanski MD    Primary Insurance: MEDICARE  Secondary Insurance: COMMERCIAL MISCELLANEOUS    ASSESSMENT:  [address] Proxies    There are no active Health Care Proxies on file         Advance Directives  Does patient have a Health Care POA?: Yes  Does patient have Advance Directives?: Yes  Advance Directives: Power of  for health care  Primary Contact: youngest son, Norah Culp              Patient Information  Admitted from[de-identified] Home  Mental Status: Alert  During Assessment patient was accompanied by: Not accompanied during assessment  Assessment information provided by[de-identified] Patient  Primary Caregiver: Self  Support Systems: Self  Type of Current Residence: [address] [place] home (lives on 2nd floor)  Upon entering residence, is there a bedroom on the main floor (no further steps)?: No  A bedroom is located on the following floor levels of residence (select all that apply):: 2nd Floor  Upon entering residence, is there a bathroom on the main floor (no further steps)?: No  Number of steps to 2nd floor from main floor: One Flight  In the last 12 months, how many places have you lived?: 1  In the last 12 months, was there a time when you did not have a steady place to sleep or slept in a shelter (including now)?: No  Homeless/housing insecurity resource given?: N/A  Living Arrangements: Lives Alone (niece lives on 1st floor; brother lives next door)    Activities of Daily Living Prior to Admission  Functional Status: Independent  Completes ADLs independently?: Yes  Ambulates independently?: Yes  Does patient use assisted devices?: No  Does patient currently own DME?: Yes  What DME does the patient currently own?: Other (Comment) (BP cuff, glucometer)  Does patient have a history of Outpatient Therapy (PT/OT)?: Yes (many years ago)  Does the patient have a history of Short-Term Rehab?: No  Does patient have a history of HHC?: No  Does patient currently have [place] [id number]?: No         Patient Information Continued  Income Source: Pension/group home  Does patient have prescription coverage?: Yes  Within the past 12 months, you worried that your food would run out before you got the money to buy more : Never true  Within the past 12 months, the food you bought just didnt last and you didnt have money to get more : Never true  Food insecurity resource given?: N/A  Does patient receive dialysis treatments?: No         Means of Transportation  Means of Transport to Appts[de-identified] Drives Self  In the past 12 months, has lack of transportation kept you from medical appointments or from getting medications?: No  In the past 12 months, has lack of transportation kept you from meetings, work, or from getting things needed for daily living?: No  Was application for public transport provided?: N/A        DISCHARGE DETAILS:    Discharge planning discussed with[de-identified] patient at bedside  Freedom of Choice: No (no d/c needs identified)     CM contacted family/caregiver?: No- see comments  Were Treatment Team discharge recommendations reviewed with patient/caregiver?: No  Did patient/caregiver verbalize understanding of patient care needs?: No  Were patient/caregiver advised of the risks associated with not following Treatment Team discharge recommendations?: No- see comments    Contacts  Patient Contacts: Leonarda Mccarty, son/POA  Relationship to Patient[de-identified] Family  Reason/Outcome: Emergency Contact    Requested [address] [facility]         Is the patient interested in [address] at discharge?: No    DME Referral Provided  Referral made for DME?: No    Other Referral/Resources/Interventions Provided:  Interventions: None Indicated  Referral Comments: Patient admitted due to acute CHF  ECHO done this morning  Patient currently on 2L o2 via NC; getting IV diuretics  Met with patient at bedside to complete assessment  Patient reports that he lives alone on 2nd floor of a 2-story home  Patient's niece lives on the 1st floor and brother lives next door  Patient reports that he's usually independent with ADLs  Denies needing any assistance device for ambulation  Has history of out-patient PT from many years ago for his neck, but denies any other services  Denies any history of home care services or past rehab need  Patient's PCP is Dr Freeman [place]  Reports that he was scheduled for a work-up for a TAVR in [place] in late April; inquired about possibly moving appointment up  Reports that he had a consult for same about a year ago, but didn't follow through  Relays interest in pursuing TAVR at this time if he's a candidate  Patient states his youngest son, Fabrice Mac, has POA  Patient states that he drove himself to the hospital and plans to drive himself home  No d/c needs identified at this time      Would you like to participate in our [address] service program?  : No - Declined    Treatment Team Recommendation: Home     Transport at Discharge : Automobile                             IMM Given (Date):: 03/22/22

## 2022-03-24 NOTE — ASSESSMENT & PLAN NOTE
Home meds include amlodipine 10 mg daily, benazepril 40 mg daily, and hydralazine 50 mg t i d   Patient not on diuretics at home    BP acceptable    Plan  Discontinue home benazepril, hydralazine -to reassess continuation at follow-up with Cardiology in outpatient setting  Continue amlodipine 10 mg daily

## 2022-03-24 NOTE — ASSESSMENT & PLAN NOTE
Lab Results   Component Value Date    EGFR 22 03/24/2022    EGFR 24 03/23/2022    EGFR 21 03/22/2022    CREATININE 2 55 (H) 03/24/2022    CREATININE 2 42 (H) 03/23/2022    CREATININE 2 67 (H) 03/22/2022   POA, 2 67  Previous baseline is 1 6-1 8  Likely in the setting of volume overload/CHF most likely, use of nephrotoxic meds, less likely poor oral intake of fluids  Patient follows up with outpatient Nephrology Dr Nicki Goodrich    Improved on diuresis from POA  Patient likely to have new baseline      Plan  Diuresis -torsemide 60 mg daily  Outpatient BMP to be performed on Monday 03/28/2022, results cc to PCP  Continue Outpatient follow-up with Nephrology

## 2022-03-24 NOTE — ASSESSMENT & PLAN NOTE
Patient reports smoking 45 cigarettes per day  Has been attempting to cut down on smoking      Plan  Counseled on smoking cessation  Smoking cessation information provided at discharge

## 2022-03-24 NOTE — ASSESSMENT & PLAN NOTE
Iron panel from 01/2022 showed low iron and low iron saturation  POA, hemoglobin 8 5  Prior to admission most recent hemoglobin was 9 7  No signs of bleeding    S/p IV Venofer x3 doses    Plan  Outpatient follow-up per PCP

## 2022-03-25 VITALS
HEIGHT: 70 IN | BODY MASS INDEX: 23.94 KG/M2 | SYSTOLIC BLOOD PRESSURE: 105 MMHG | WEIGHT: 167.2 LBS | HEART RATE: 69 BPM | DIASTOLIC BLOOD PRESSURE: 52 MMHG | RESPIRATION RATE: 20 BRPM | OXYGEN SATURATION: 91 % | TEMPERATURE: 97.7 F

## 2022-03-25 PROBLEM — J96.01 ACUTE RESPIRATORY FAILURE WITH HYPOXIA (HCC): Status: RESOLVED | Noted: 2022-03-23 | Resolved: 2022-03-25

## 2022-03-25 LAB
ANION GAP SERPL CALCULATED.3IONS-SCNC: 9 MMOL/L (ref 4–13)
BUN SERPL-MCNC: 56 MG/DL (ref 5–25)
CALCIUM SERPL-MCNC: 8.1 MG/DL (ref 8.3–10.1)
CHLORIDE SERPL-SCNC: 106 MMOL/L (ref 100–108)
CO2 SERPL-SCNC: 25 MMOL/L (ref 21–32)
CREAT SERPL-MCNC: 2.52 MG/DL (ref 0.6–1.3)
ERYTHROCYTE [DISTWIDTH] IN BLOOD BY AUTOMATED COUNT: 15.6 % (ref 11.6–15.1)
GFR SERPL CREATININE-BSD FRML MDRD: 23 ML/MIN/1.73SQ M
GLUCOSE SERPL-MCNC: 121 MG/DL (ref 65–140)
GLUCOSE SERPL-MCNC: 221 MG/DL (ref 65–140)
GLUCOSE SERPL-MCNC: 98 MG/DL (ref 65–140)
HCT VFR BLD AUTO: 22.9 % (ref 36.5–49.3)
HGB BLD-MCNC: 7.1 G/DL (ref 12–17)
MCH RBC QN AUTO: 27.7 PG (ref 26.8–34.3)
MCHC RBC AUTO-ENTMCNC: 31 G/DL (ref 31.4–37.4)
MCV RBC AUTO: 90 FL (ref 82–98)
PLATELET # BLD AUTO: 209 THOUSANDS/UL (ref 149–390)
PMV BLD AUTO: 10.3 FL (ref 8.9–12.7)
POTASSIUM SERPL-SCNC: 4 MMOL/L (ref 3.5–5.3)
RBC # BLD AUTO: 2.56 MILLION/UL (ref 3.88–5.62)
SODIUM SERPL-SCNC: 140 MMOL/L (ref 136–145)
WBC # BLD AUTO: 5.34 THOUSAND/UL (ref 4.31–10.16)

## 2022-03-25 PROCEDURE — 80048 BASIC METABOLIC PNL TOTAL CA: CPT | Performed by: NURSE PRACTITIONER

## 2022-03-25 PROCEDURE — 99232 SBSQ HOSP IP/OBS MODERATE 35: CPT | Performed by: INTERNAL MEDICINE

## 2022-03-25 PROCEDURE — 82948 REAGENT STRIP/BLOOD GLUCOSE: CPT

## 2022-03-25 PROCEDURE — 85027 COMPLETE CBC AUTOMATED: CPT

## 2022-03-25 PROCEDURE — 99239 HOSP IP/OBS DSCHRG MGMT >30: CPT | Performed by: INTERNAL MEDICINE

## 2022-03-25 RX ORDER — FUROSEMIDE 80 MG
80 TABLET ORAL DAILY
Status: DISCONTINUED | OUTPATIENT
Start: 2022-03-25 | End: 2022-03-25

## 2022-03-25 RX ORDER — TORSEMIDE 20 MG/1
60 TABLET ORAL DAILY
Status: DISCONTINUED | OUTPATIENT
Start: 2022-03-25 | End: 2022-03-25 | Stop reason: HOSPADM

## 2022-03-25 RX ADMIN — TORSEMIDE 60 MG: 20 TABLET ORAL at 11:21

## 2022-03-25 RX ADMIN — POLYETHYLENE GLYCOL 3350 17 G: 17 POWDER, FOR SOLUTION ORAL at 08:28

## 2022-03-25 RX ADMIN — PANTOPRAZOLE SODIUM 40 MG: 40 TABLET, DELAYED RELEASE ORAL at 08:28

## 2022-03-25 RX ADMIN — PANCRELIPASE 24000 UNITS: 24000; 76000; 120000 CAPSULE, DELAYED RELEASE PELLETS ORAL at 08:29

## 2022-03-25 RX ADMIN — NICOTINE 1 PATCH: 7 PATCH, EXTENDED RELEASE TRANSDERMAL at 08:28

## 2022-03-25 RX ADMIN — HEPARIN SODIUM 5000 UNITS: 5000 INJECTION INTRAVENOUS; SUBCUTANEOUS at 05:04

## 2022-03-25 RX ADMIN — DOCUSATE SODIUM 100 MG: 100 CAPSULE ORAL at 08:28

## 2022-03-25 RX ADMIN — AMLODIPINE BESYLATE 10 MG: 10 TABLET ORAL at 08:28

## 2022-03-25 RX ADMIN — ASPIRIN 81 MG: 81 TABLET, COATED ORAL at 08:28

## 2022-03-25 NOTE — DISCHARGE INSTR - AVS FIRST PAGE
Dear Tara Marcelino ,     It was our pleasure to care for you here at [facility]  It is our hope that we were always able to exceed the expected standards for your care during your stay  You were hospitalized due to ***  You were cared for on the *** floor by Raymon Dickey MD under the service of Karin Willis MD with the [address] [place] [address] [facility] who covers for your primary care physician (PCP), Severa Bos, MD, while you were hospitalized  If you have any questions or concerns related to this hospitalization, you may contact us at [phone]  For follow up as well as any medication refills, we recommend that you follow up with your primary care physician  A registered nurse will reach out to you by phone within a few days after your discharge to answer any additional questions that you may have after going home  However, at this time we provide for you here, the most important instructions / recommendations at discharge:     Notable Medication Adjustments -   New medication: Torsemide 60 mg daily - this is a diuretic which were you will be taking for management of your congestive heart failure  This will avoid you from getting volume overloaded and developing shortness of breath and lower extremity swelling  Continue taking amlodipine 10 mg daily - for blood pressure control  At this time, hold your home hydralazine and benazepril until you follow-up with your cardiologists in clinic  Testing Required after Discharge -   You have a basic metabolic panel lab order be performed on Monday 03/28/2022 - for monitoring of your renal function  These results will be followed up by your PCP    Important follow up information -   Call to schedule an appointment for follow-up with Cardiology in the outpatient setting  Call to schedule an appointment for follow-up with your primary care physician (PCP)  Other Instructions -   Monitor your weight at home - if you increase 3 lb or more in 1 day, contact your PCP or your cardiologist as soon as possible  Abstain from smoking tobacco, as this increases your risk of cardiovascular disease progression and decreases life expectancy  Please review this entire after visit summary as additional general instructions including medication list, appointments, activity, diet, any pertinent wound care, and other additional recommendations from your care team that may be provided for you        Sincerely,     Armida Linn MD

## 2022-03-25 NOTE — PLAN OF CARE
Problem: Neurological Deficit  Goal: Neurological status is stable or improving  Description: Interventions:  - Monitor and assess patient's level of consciousness, motor function, sensory function, and level of assistance needed for ADLs  - Monitor and report changes from baseline  Collaborate with interdisciplinary team to initiate plan and implement interventions as ordered  - Provide and maintain a safe environment  - Consider seizure precautions  - Consider fall precautions  - Consider aspiration precautions  - Consider bleeding precautions  Outcome: Progressing     Problem: Activity Intolerance/Impaired Mobility  Goal: Mobility/activity is maintained at optimum level for patient  Description: Interventions:  - Assess and monitor patient  barriers to mobility and need for assistive/adaptive devices  - Assess patient's emotional response to limitations  - Collaborate with interdisciplinary team and initiate plans and interventions as ordered  - Encourage independent activity per ability   - Maintain proper body alignment  - Perform active/passive rom as tolerated/ordered  - Plan activities to conserve energy   - Turn patient as appropriate  Outcome: Progressing     Problem: Communication Impairment  Goal: Ability to express needs and understand communication  Description: Assess patient's communication skills and ability to understand information  Patient will demonstrate use of effective communication techniques, alternative methods of communication and understanding even if not able to speak  - Encourage communication and provide alternate methods of communication as needed  - Collaborate with case management/ for discharge needs  - Include patient/family/caregiver in decisions related to communication    Outcome: Progressing     Problem: Potential for Aspiration  Goal: Non-ventilated patient's risk of aspiration is minimized  Description: Assess and monitor vital signs, respiratory status, and labs (WBC)  Monitor for signs of aspiration (tachypnea, cough, rales, wheezing, cyanosis, fever)  - Assess and monitor patient's ability to swallow  - Place patient up in chair to eat if possible  - HOB up at 90 degrees to eat if unable to get patient up into chair   - Supervise patient during oral intake  - Instruct patient/ family to take small bites  - Instruct patient/ family to take small single sips when taking liquids  - Follow patient-specific strategies generated by speech pathologist   Outcome: Progressing  Goal: Ventilated patient's risk of aspiration is minimized  Description: Assess and monitor vital signs, respiratory status, airway cuff pressure, and labs (WBC)  Monitor for signs of aspiration (tachypnea, cough, rales, wheezing, cyanosis, fever)  - Elevate head of bed 30 degrees if patient has tube feeding   - Monitor tube feeding  Outcome: Progressing     Problem: Nutrition  Goal: Nutrition/Hydration status is improving  Description: Monitor and assess patient's nutrition/hydration status for malnutrition (ex- brittle hair, bruises, dry skin, pale skin and conjunctiva, muscle wasting, smooth red tongue, and disorientation)  Collaborate with interdisciplinary team and initiate plan and interventions as ordered  Monitor patient's weight and dietary intake as ordered or per policy  Utilize nutrition screening tool and intervene per policy  Determine patient's food preferences and provide high-protein, high-caloric foods as appropriate  - Assist patient with eating   - Allow adequate time for meals   - Encourage patient to take dietary supplement as ordered  - Collaborate with clinical nutritionist   - Include patient/family/caregiver in decisions related to nutrition    Outcome: Progressing     Problem: CARDIOVASCULAR - ADULT  Goal: Maintains optimal cardiac output and hemodynamic stability  Description: INTERVENTIONS:  - Monitor I/O, vital signs and rhythm  - Monitor for S/S and trends of decreased cardiac output  - Administer and titrate ordered vasoactive medications to optimize hemodynamic stability  - Assess quality of pulses, skin color and temperature  - Assess for signs of decreased coronary artery perfusion  - Instruct patient to report change in severity of symptoms  Outcome: Progressing  Goal: Absence of cardiac dysrhythmias or at baseline rhythm  Description: INTERVENTIONS:  - Continuous cardiac monitoring, vital signs, obtain 12 lead EKG if ordered  - Administer antiarrhythmic and heart rate control medications as ordered  - Monitor electrolytes and administer replacement therapy as ordered  Outcome: Progressing     Problem: RESPIRATORY - ADULT  Goal: Achieves optimal ventilation and oxygenation  Description: INTERVENTIONS:  - Assess for changes in respiratory status  - Assess for changes in mentation and behavior  - Position to facilitate oxygenation and minimize respiratory effort  - Oxygen administered by appropriate delivery if ordered  - Initiate smoking cessation education as indicated  - Encourage broncho-pulmonary hygiene including cough, deep breathe, Incentive Spirometry  - Assess the need for suctioning and aspirate as needed  - Assess and instruct to report SOB or any respiratory difficulty  - Respiratory Therapy support as indicated  Outcome: Progressing     Problem: METABOLIC, FLUID AND ELECTROLYTES - ADULT  Goal: Electrolytes maintained within normal limits  Description: INTERVENTIONS:  - Monitor labs and assess patient for signs and symptoms of electrolyte imbalances  - Administer electrolyte replacement as ordered  - Monitor response to electrolyte replacements, including repeat lab results as appropriate  - Instruct patient on fluid and nutrition as appropriate  Outcome: Progressing  Goal: Fluid balance maintained  Description: INTERVENTIONS:  - Monitor labs   - Monitor I/O and WT  - Instruct patient on fluid and nutrition as appropriate  - Assess for signs & symptoms of volume excess or deficit  Outcome: Progressing     Problem: SAFETY ADULT  Goal: Patient will remain free of falls  Description: INTERVENTIONS:  - Educate patient/family on patient safety including physical limitations  - Instruct patient to call for assistance with activity   - Consult OT/PT to assist with strengthening/mobility   - Keep Call bell within reach  - Keep bed low and locked with side rails adjusted as appropriate  - Keep care items and personal belongings within reach  - Initiate and maintain comfort rounds  - Make Fall Risk Sign visible to staff  - Offer Toileting every 2 Hours, in advance of need  - Apply yellow socks and bracelet for high fall risk patients  - Consider moving patient to room near nurses station  Outcome: Progressing     Problem: Potential for Falls  Goal: Patient will remain free of falls  Description: INTERVENTIONS:  - Educate patient/family on patient safety including physical limitations  - Instruct patient to call for assistance with activity   - Consult OT/PT to assist with strengthening/mobility   - Keep Call bell within reach  - Keep bed low and locked with side rails adjusted as appropriate  - Keep care items and personal belongings within reach  - Initiate and maintain comfort rounds  - Make Fall Risk Sign visible to staff  - Offer Toileting every 2 Hours, in advance of need  - Apply yellow socks and bracelet for high fall risk patients  - Consider moving patient to room near nurses station  Outcome: Progressing

## 2022-03-25 NOTE — CASE MANAGEMENT
Case Management Discharge Planning Note    Patient name Rizwana Johnson    Location S /S -01 MRN [id number]  : 1945 Date 3/25/2022       Current Admission Date: 3/22/2022  Current Admission Diagnosis:Acute CHF (congestive heart failure) [facility])   Patient Active Problem List    Diagnosis Date Noted    [facility] with [facility] 2022    Acute CHF (congestive heart failure) ([facility] ) 2022    Acute kidney injury superimposed on chronic kidney disease ([facility] ) 2022    Iron deficiency anemia 2022    Smoker 2022    Persistent proteinuria, unspecified 01/10/2022    Anemia due to stage 4 chronic kidney disease ([facility] ) 01/10/2022    Leukopenia 01/10/2022    Hypomagnesemia 01/10/2022    Renal cyst, left 01/10/2022    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 ([facility] ) 09/10/2021    GERD (gastroesophageal reflux disease)     Hyperlipidemia     Benign hypertension with chronic kidney disease, stage III ([facility] ) 2021    Type 2 diabetes mellitus, without long-term current use of insulin ([facility] ) 2021    Sinus bradycardia 10/08/2020    Nonrheumatic aortic valve stenosis 2019    Calcific tendinitis of right shoulder region 2019    Right shoulder pain 2019    Cigarette nicotine dependence with nicotine-induced disorder 10/22/2018    Stenosis of noncoronary bypass graft ([facility] ) 2016    DM (diabetes mellitus) with peripheral vascular complication ([facility] )     Asymptomatic bilateral carotid artery stenosis 2016    S/P CABG (coronary artery bypass graft) 2016    Hypertension 2016    Aorto-iliac disease [facility] ) 2016    Renal artery stenosis, native, bilateral ([facility] ) 2016    Dyslipidemia 2016    CAD (coronary artery disease) 2016    Progressive angina ([facility] ) 2016    Atherosclerosis of native arteries of extremities with intermittent claudication, bilateral legs (HCC)     PAD (peripheral artery disease) (HCC)     Tension headache 10/24/2013      LOS (days): 3  Geometric Mean LOS (GMLOS) (days): 3 80  Days to GMLOS:0 9     OBJECTIVE:  Risk of Unplanned Readmission Score: 19         Current admission status: Inpatient   Preferred Pharmacy:   [facility] [phone]  [place] [address]  Phone: [phone] Fax: [phone]    Primary Care Provider: Jennifer Bethea MD    Primary Insurance: MEDICARE  Secondary Insurance: COMMERCIAL MISCELLANEOUS    DISCHARGE DETAILS:    Discharge planning discussed with[de-identified] patient at bedside  Freedom of Choice: No     CM contacted family/caregiver?: No- see comments (declined)                            Other Referral/Resources/Interventions Provided:  Interventions: None Indicated  Referral Comments: Patient to be d/c'd today - o2 stable on room air  Appointment with Dr Wilfrido Arango at [place] re-scheduled for April 1st; patient aware of same  Patient states he plans to drive himself home  No needs identified at this time  IMM reviewed with him which he signed; copy provided for his records  Patient has no concerns about discharge home today, and reports that he's ready to get out of the hospital  Plans to drive himself home after AVS reviewed  Would you like to participate in our [facility] service program?  : No - Declined    Treatment Team Recommendation: Home  Discharge Destination Plan[de-identified] Home  Transport at Discharge : Automobile                             IMM Given (Date):: 03/25/22  IMM Given to[de-identified] Patient (patient signed; copy provided   Patient denies having concerns about d/c today )

## 2022-03-25 NOTE — DISCHARGE INSTRUCTIONS
Take your medications as directed, and keep your follow up appointments  Adhere to a heart healthy lifestyle, maintaining a low sodium diet  Daily weight and record  If your weight increases 2-3 lbs in one day, or 5 lbs in 2 days, you are short of breath or have lower extremity swelling, please call the heart failure team at  [facility] at [phone]  Aortic Stenosis   WHAT YOU NEED TO KNOW:   Aortic stenosis is a condition that makes your aortic valve become narrow and stiff  The narrow, stiff valve causes your heart to work harder to pump blood into the aorta  DISCHARGE INSTRUCTIONS:   Call your local emergency number (911 in the [address] [facility]) or have someone call if:   · You have any of the following signs of a heart attack:      ? Squeezing, pressure, or pain in your chest    ? You may  also have any of the following:     § Discomfort or pain in your back, neck, jaw, stomach, or arm    § Shortness of breath    § Nausea or vomiting    § Lightheadedness or a sudden cold sweat    · You have any of the following signs of a stroke:      ? Numbness or drooping on one side of your face     ? Weakness in an arm or leg    ? Confusion or difficulty speaking    ? Dizziness, a severe headache, or vision loss    Seek care immediately if:   · You have chest pain when you move around  It goes away when you are still  · You have increasing shortness of breath  · You faint  Call your doctor or cardiologist if:   · The veins in your neck look swollen or are bulging  · You have increased swelling in your legs or ankles  · Your heart beats faster than usual     · You feel your heart flutter often  · You have questions or concerns about your condition or care  Medicines: You may need any of the following:  · Medicines  may help decrease your cholesterol levels and your blood pressure  You may also be given medicine to help lower swelling  · Take your medicine as directed    Contact your healthcare provider if you think your medicine is not helping or if you have side effects  Tell him or her if you are allergic to any medicine  Keep a list of the medicines, vitamins, and herbs you take  Include the amounts, and when and why you take them  Bring the list or the pill bottles to follow-up visits  Carry your medicine list with you in case of an emergency  Manage aortic stenosis:   · Limit activities  Your healthcare provider may have you limit strenuous activity  Strenuous activity will make your heart work too hard  Ask your healthcare provider what activities are safe for you to do  · Eat heart-healthy foods  Heart-healthy foods include salmon, tuna, walnuts, whole-grain breads, low-fat dairy products, beans, and oils such as olive or canola oil  A dietitian or your provider can give you more information on meal plans such as the DASH (Dietary Approaches to Stop Hypertension) eating plan  The DASH plan is low in sodium, processed sugar, unhealthy fats, and total fat  It is high in potassium, calcium, and fiber  These can be found in vegetables, fruit, and whole-grain foods  · Limit sodium (salt) as directed  Too much sodium can affect your fluid balance  Check labels to find low-sodium or no-salt-added foods  You can also make small changes to get less salt  For example, if you add salt while you cook, do not add more salt at the table  Ask your healthcare provider or dietitian for more ways to cut down on salt  · Limit or do not drink alcohol  Ask your healthcare provider if it is okay for you to drink alcohol  Alcohol can increase your risk for high blood pressure and coronary artery disease  Your provider can tell you how many drinks are okay to have within 24 hours or within 1 week  A drink of alcohol is 12 ounces of beer, 5 ounces of wine, or 1½ ounces of liquor  · Maintain a healthy weight    Being overweight can increase your risk for high blood pressure and coronary artery disease  These conditions can make your symptoms worse  Ask your healthcare provider what a healthy weight is for you  Ask him or her to help you create a weight loss plan if you are overweight  · Talk to your healthcare provider about pregnancy  If you are a woman and want to get pregnant, talk to your healthcare provider  You and your baby may need to be monitored by specialists during your pregnancy  · Ask about vaccines you may need  Certain diseases are dangerous for a person who has aortic stenosis  Vaccines help prevent infections that can cause some diseases  Get a flu vaccine as soon as recommended each year, usually in September or October  Your healthcare provider can tell you if you also need other vaccines, and when to get them  Prevent aortic stenosis:   · Manage other health conditions  High blood pressure and high cholesterol levels increase your risk for aortic stenosis  Ask your healthcare provider for more information on managing these conditions  · Get treatment for strep throat  If strep throat is not treated, it can cause rheumatic fever  · Take care of your teeth and gums  Gingivitis, a gum disease, increases your risk for aortic stenosis  See your dental provider regularly to treat problems early  Follow up with your doctor or cardiologist as directed: You may need to return for more tests to check your heart over time  Write down your questions so you remember to ask them during your visits  © Copyright [employer] 2022 Information is for End User's use only and may not be sold, redistributed or otherwise used for commercial purposes  All illustrations and images included in CareNotes® are the copyrighted property of [employer] A M , Inc  or [facility] Jame Villarreal   The above information is an  only  It is not intended as medical advice for individual conditions or treatments   Talk to your doctor, nurse or pharmacist before following any medical regimen to see if it is safe and effective for you  Heart Failure   WHAT YOU NEED TO KNOW:   Heart failure is a condition that does not allow your heart to fill or pump properly  Not enough oxygen in your blood gets to your organs and tissues  Fluid may not move through your body properly  Fluid builds up and causes swelling and trouble breathing  This is known as congestive heart failure  Heart failure may start in the left or right ventricle  Heart failure is often caused by damage or injury to your heart  The damage may be caused by other heart problems, diabetes, or high blood pressure  The damage may have also been caused by an infection  Heart failure is a long-term condition that tends to get worse over time  It is important to manage your health to improve your quality of life  DISCHARGE INSTRUCTIONS:   Call your local emergency number (911 in the [address] [facility]) if:   · You have any of the following signs of a heart attack:      ? Squeezing, pressure, or pain in your chest    ? You may  also have any of the following:     § Discomfort or pain in your back, neck, jaw, stomach, or arm    § Shortness of breath    § Nausea or vomiting    § Lightheadedness or a sudden cold sweat      Call your doctor if:   · Your heartbeat is fast, slow, or uneven all the time  · You have symptoms of worsening heart failure:      ? Shortness of breath at rest, at night, or that is getting worse in any way    ? Weight gain of 3 or more pounds (1 4 kg) in a day, or more than your healthcare provider says is okay    ? More swelling in your legs or ankles    ? Abdominal pain or swelling    ? More coughing    ? Loss of appetite    ? Feeling tired all the time    · You feel hopeless or depressed, or you have lost interest in things you used to enjoy  · You often feel worried or afraid  · You have questions or concerns about your condition or care      Medicines:   · Medicines  may be given to help regulate your heart rhythm and lower your blood pressure  You may also need medicines to help decrease extra fluids  Medicines, such as NSAIDs, may be stopped if they are causing your heart failure to become worse  Do not stop any of your medicines on your own  · Take your medicine as directed  Contact your healthcare provider if you think your medicine is not helping or if you have side effects  Tell him or her if you are allergic to any medicine  Keep a list of the medicines, vitamins, and herbs you take  Include the amounts, and when and why you take them  Bring the list or the pill bottles to follow-up visits  Carry your medicine list with you in case of an emergency  Go to cardiac rehab if directed:  Cardiac rehab is a program run by specialists who will help you safely strengthen your heart  In the program you will learn about exercise, relaxation, stress management, and heart-healthy nutrition  Manage swelling from extra fluid:   · Elevate (raise) your legs above the level of your heart  This will help with fluid that builds up in your legs or ankles  Elevate your legs as often as possible during the day  Prop your legs on pillows or blankets to keep them elevated comfortably  Try not to stand for long periods of time during the day  Move around to keep your blood circulating  · Limit sodium (salt)  Ask how much sodium you can have each day  Your healthcare provider may give you a limit, such as 2,300 milligrams (mg) a day  Your provider or a dietitian can teach you how to read food labels for the number of mg in a food  He or she can also help you find ways to have less salt  For example, if you add salt to food as you cook, do not add more at the table  · Drink liquids as directed  You may need to limit the amount of liquid you drink within 24 hours  Your healthcare provider will tell you how much liquid to have and which liquids are best for you   He or she may tell you to limit liquid to 1 5 to 2 liters in a day  He or she will also tell you how often to drink liquid throughout the day  · Weigh yourself every morning  Use the same scale, in the same spot  Do this after you use the bathroom, but before you eat or drink  Wear the same type of clothing each time  Write down your weight and call your healthcare provider if you have a sudden weight gain  Swelling and weight gain are signs of fluid buildup  Manage heart failure: Your quality of life may improve with treatment and the following:  · Do not smoke  Nicotine and other chemicals in cigarettes and cigars can cause lung and heart damage  Ask your healthcare provider for information if you currently smoke and need help to quit  E-cigarettes or smokeless tobacco still contain nicotine  Talk to your healthcare provider before you use these products  · Do not drink alcohol or use illegal drugs  Alcohol and drugs can increase your risk for high blood pressure, diabetes, and coronary artery disease  · Eat heart-healthy foods  Heart-healthy foods include fruits, vegetables, lean meat (such as beef, chicken, or pork), and low-fat dairy products  Fatty fish such as salmon and tuna are also heart healthy  Other heart-healthy foods include walnuts, whole-grain breads, beans, and cooked beans  Replace butter and margarine with heart-healthy oils such as olive oil or canola oil  Your provider or a dietitian can help you create heart-healthy meal plans  · Manage any chronic health conditions you have  These include high blood pressure, diabetes, obesity, high cholesterol, metabolic syndrome, and COPD  You will have fewer symptoms if you manage these health conditions  Follow your healthcare provider's recommendations and follow up with him or her regularly  · Maintain a healthy weight  Being overweight can increase your risk for high blood pressure, diabetes, and coronary artery disease  These conditions can make your symptoms worse  Ask your healthcare provider how much you should weigh  Ask him or her to help you create a weight loss plan if you are overweight  · Stay active  Activity can help keep your symptoms from getting worse  Walking is a type of physical activity that helps maintain your strength and improve your mood  Physical activity also helps you manage your weight  Work with your healthcare provider to create an exercise plan that is right for you  · Get vaccines as directed  The flu and pneumonia can be severe for a person who has heart failure  Vaccines protect you from these infections  Get a flu shot every year as soon as it is recommended, usually in September or October  You may also need the pneumonia vaccine  Your healthcare provider can tell you if you need other vaccines, and when to get them  Follow up with your doctor within 7 days and as directed: You may need to return for other tests  You may need home health care  A healthcare provider will monitor your vital signs, weight, and make sure your medicines are working  Write down your questions so you remember to ask them during your visits  Join a support group:  Heart failure can be difficult to manage  It may be helpful to talk with others who have heart failure  You may learn how to better manage your condition or get emotional support  For more information:  · [address]  [place]   Phone: 9- 863 - [phone]  Web Address: https://www [employer]/  [address] [facility] 2022 Information is for End User's use only and may not be sold, redistributed or otherwise used for commercial purposes  All illustrations and images included in CareNotes® are the copyrighted property of [employer] , Inc  or [facility] Jame Villarreal   The above information is an  only  It is not intended as medical advice for individual conditions or treatments   Talk to your doctor, nurse or pharmacist before following any medical regimen to see if it is safe and effective for you

## 2022-03-25 NOTE — DISCHARGE SUMMARY
[facility]  Discharge- Una Watson  1945, 76 y o  male MRN: [id number]  Unit/Bed#: S -01 Encounter: [id number]  Primary Care Provider: Sanchez Espinoza MD   Date and time admitted to hospital: 3/22/2022  1:22 PM    * Acute CHF (congestive heart failure) ([facility] [address] )  Assessment & Plan  On presentation - Chief complaint shortness of breath, over a week and half, worse with exertion  With associated orthopnea, cough productive of clear sputum, palpitations, and lower extremity swelling  Patient denied chest pain  Patient with extensive cardiac history including severe AS, CAD s/p CABG, severe B/L carotid disease, PAD s/p bypass, beta-blocker induced bradycardia    EKG - normal sinus rhythm, with ST depression in inferolateral leads with T-wave inversion  CXR - vascular congestion with bilateral pleural effusions  Troponin at [facility] 674, at [facility] 588 with delta -86, at [facility] with delta 53  NT proBNP [phone]  Etiology of patient's symptoms likely from acute heart failure secondary to severe AS disease, VS ACS/NSTEMI  Echo - LVEF 45%, systolic function low normal   Grade 2 DD  Mild concentric hypertrophy  Moderate hypokinesis  Moderate MR  AR severe stenosis redemonstrated  Dilation of IVC  On evaluation, diuresing appropriately  Bibasilar crackles improved, lower extremity pitting edema now trace, and remaining on 2 L O2 NC  Negative JVD  Ambulatory pulse ox on room air satting 100%  Plan  Cardiology consulted, appreciate recommendations  At discharge torsemide 60 mg daily  Weights daily, Diet cardiovascular 2 g sodium at home  Continue home ASA and statin  Continuing home amlodipine  Discontinue home benazepril and hydralazine at discharge, as per Cardiology  To reassess continuation at outpatient follow-up  Outpatient follow-up with Cardiology      Nonrheumatic aortic valve stenosis  Assessment & Plan  Per chart review, documentation suggests 1st noted in 2015 on echocardiogram   Aortic valve stenosis noted as severe on 05/2021  Patient follows up with outpatient Cardiology Dr Funmi Whalen  At last follow-up with Cardiology, patient considering TAVR and was to follow-up with CT surgery  Plan  As above in "acute CHF"  Cm arranging sooner follow-up with CT surgery for TAVR  Continue outpatient follow-up with Cardiology    Acute respiratory failure with hypoxia (HCC)-resolved as of 3/25/2022  Assessment & Plan  POA, patient requiring 2 L O2 NC  Baseline is room air  Most likely etiology secondary to acute CHF/volume overload  Patient in no respiratory distress  Patient on 2 L O2 NC  Patient reports dyspnea worse with exertion  On exam, volume overloaded with crackles bibasilar  Ambulatory pulse ox satting 100% on room air    Plan  P o  Diuretics    Acute kidney injury superimposed on chronic kidney disease [facility])  Assessment & Plan  Lab Results   Component Value Date    EGFR 22 03/24/2022    EGFR 24 03/23/2022    EGFR 21 03/22/2022    CREATININE 2 55 (H) 03/24/2022    CREATININE 2 42 (H) 03/23/2022    CREATININE 2 67 (H) 03/22/2022   POA, 2 67  Previous baseline is 1 6-1 8  Likely in the setting of volume overload/CHF most likely, use of nephrotoxic meds, less likely poor oral intake of fluids  Patient follows up with outpatient Nephrology Dr Shaw Loza    Improved on diuresis from POA  Patient likely to have new baseline  Plan  Diuresis -torsemide 60 mg daily  Outpatient BMP to be performed on Monday 03/28/2022, results cc to PCP  Continue Outpatient follow-up with Nephrology    Smoker  Assessment & Plan  Patient reports smoking 45 cigarettes per day  Has been attempting to cut down on smoking  Plan  Counseled on smoking cessation  Smoking cessation information provided at discharge    Iron deficiency anemia  Assessment & Plan  Iron panel from 01/2022 showed low iron and low iron saturation  POA, hemoglobin 8 5    Prior to admission most recent hemoglobin was 9 7   No signs of bleeding  S/p IV Venofer x3 doses    Plan  Outpatient follow-up per PCP    Type 2 diabetes mellitus, without long-term current use of insulin [facility])  Assessment & Plan  Lab Results   Component Value Date    HGBA1C 5 6 03/23/2022     Recent Labs     03/24/22  1648 03/24/22  2028 03/25/22  0825 03/25/22  1105   POCGLU 107 198* 121 221*     Blood Sugar Average: Last 72 hrs:    Home meds include metformin  Diabetes mellitus well controlled  Plan  Continue home oral hypoglycemic med  Carb controlled diet at discharge    Hypertension  Assessment & Plan  Home meds include amlodipine 10 mg daily, benazepril 40 mg daily, and hydralazine 50 mg t i d   Patient not on diuretics at home  BP acceptable    Plan  Discontinue home benazepril, hydralazine -to reassess continuation at follow-up with Cardiology in outpatient setting  Continue amlodipine 10 mg daily  Discharging Resident Physician: Tash Rivers MD  Attending: Dr Kamila Wu  PCP: Nirmal Hartley MD  Admission Date: 3/22/2022  Discharge Date: 03/25/22    Disposition:     Home    Reason for Admission:  Shortness of breath    Consultations During Hospital Stay:  · Cardiology    Procedures Performed:     · None    Significant Findings / Test Results:     XR chest 2 views    Result Date: 3/23/2022  Impression: Small bilateral pleural effusions, right greater than left with minimal adjacent multilobar consolidation likely subsegmental atelectasis  Workstation performed: [id number]       · No Chest XR results available for this patient  ·   · Echocardiogram showed LVEF 24%, grade 2 diastolic dysfunction, and severe aortic stenosis as noticed before  Incidental Findings:   · None     Test Results Pending at Discharge (will require follow up):    · None     Outpatient Tests Requested:  · Outpatient BMP to be performed on Monday 03/20/2022, results cc'd too PCP for follow-up    Complications:  None    Hospital Course:     Jane Horn [place] Almendarez  is a 76 y o  male patient who originally presented to the hospital on 3/22/2022 due to shortness of breath  Patient also reported cough productive of clear sputum  On exam with 2+ pitting edema and requiring oxygen supplementation up to 2 L  Patient was significantly volume overloaded and was given x1 80 mg Lasix in ED  Patient reassessed in ED and noted improvement in shortness of breath but still remaining on 2 L O2  Patient's presentation initially concerning for ACS and started on heparin drip, however, elevated cardiac markers like coli more related to acute heart failure and therefore was discontinued from drip  Patient arrived with elevated creatinine suggesting SHOAIB on on CKD  Chest x-ray showed vascular congestion with bilateral pleural effusions  Cardiology team assisted in management  Patient was started on Lasix 80 mg b i d  Echocardiogram showed LVEF 17%, grade 2 diastolic dysfunction and severe aortic stenosis as noted in the past   Most likely cause of patient's acute CHF is associated with his severe nonrheumatic aortic stenosis  Patient's volume status improved on diuretic and creatinine remained elevated  It is probable that patient has a new baseline of creatinine  Patient was weaned off of oxygen and on ambulation was saturating at 100% on room air  At discharge patient to continue torsemide 60 mg daily and follow-up with outpatient Cardiology  Patient to continue amlodipine at home for blood pressure control, and to discontinue for now hydralazine and benazepril until followed up with Cardiology  Evaluation, patient is stable and cleared for discharge  Condition at Discharge: stable     Discharge Day Visit / Exam:     Subjective:  Nursing team reported no overnight events  Patient denied shortness of breath including on exertion, and reported continued lower extremity swelling but improved from admission    Patient denied chest pain, cough, palpitations, lightheadedness, abdominal pain  Patient found sitting at chair and having breakfast in no distress  Vitals: Blood Pressure: 105/52 (03/25/22 1115)  Pulse: 69 (03/25/22 0818)  Temperature: 97 7 °F (36 5 °C) (03/25/22 0818)  Temp Source: Oral (03/25/22 0818)  Respirations: 20 (03/25/22 0818)  Height: 5' 10" (177 8 cm) (03/24/22 0830)  Weight - Scale: 75 8 kg (167 lb 3 2 oz) (03/25/22 0556)  SpO2: 91 % (03/25/22 0818)  Exam:   Physical Exam  Vitals and nursing note reviewed  Constitutional:       General: He is not in acute distress  Appearance: Normal appearance  He is not ill-appearing or diaphoretic  HENT:      Head: Normocephalic and atraumatic  Mouth/Throat:      Mouth: Mucous membranes are moist       Pharynx: Oropharynx is clear  Eyes:      General: No scleral icterus  Conjunctiva/sclera: Conjunctivae normal    Neck:      Vascular: No JVD  Cardiovascular:      Rate and Rhythm: Normal rate and regular rhythm  Pulses: Normal pulses  Heart sounds: Normal heart sounds  No murmur heard  No gallop  Pulmonary:      Effort: Pulmonary effort is normal  No respiratory distress  Breath sounds: Rales ( mild bibasilar) present  No wheezing  Abdominal:      General: Bowel sounds are normal  There is no distension  Palpations: Abdomen is soft  Tenderness: There is no abdominal tenderness  Musculoskeletal:         General: No tenderness  Normal range of motion  Cervical back: Normal range of motion and neck supple  No tenderness  Right lower leg: Edema (Trace at shin, pitting at foot) present  Left lower leg: Edema ( trace at shin, pitting at foot) present  Lymphadenopathy:      Cervical: No cervical adenopathy  Skin:     General: Skin is warm and dry  Capillary Refill: Capillary refill takes less than 2 seconds  Coloration: Skin is not jaundiced or pale  Neurological:      General: No focal deficit present        Mental Status: He is alert and oriented to person, place, and time  Mental status is at baseline  Motor: No weakness  Psychiatric:         Mood and Affect: Mood normal          Behavior: Behavior normal          Thought Content: Thought content normal          Judgment: Judgment normal        Discussion with Family:  Patient declined update to primary contact    Discharge instructions/Information to patient and family:   See after visit summary for information provided to patient and family  Provisions for Follow-Up Care:  See after visit summary for information related to follow-up care and any pertinent home health orders  Planned Readmission:  None     Discharge Medications:  See after visit summary for reconciled discharge medications provided to patient and family        ** Please Note: This note has been constructed using a voice recognition system **

## 2022-03-25 NOTE — PROGRESS NOTES
General Cardiology   Progress Note -  [facility]   Dudley Vivas  76 y o  male MRN: [id number]  Unit/Bed#: S -01 Encounter: [id number]    Assessment  1  Non MI troponin elevation- 1st troponin 674, 2 hr troponin 588 (negative delta)   ECG shows NSR with LVH and repolarization abnormalities   No complaints of chest pain but does note dyspnea on exertion likely in setting of AS and CHF  2  Acute diastolic CHF- CXR with vascular congestion and small right effusion, on 2 L nasal cannula, NT proBNP 45004    Updated echo shows LVEF 50% with grade II DD, moderate hypokinesis of basal-mid inferior wall; severe AS and moderate MR, dilated IVC  3  Severe AS- Echo shows severe AS with LUIS 0 83 cm2, MG 34 mmHg  4  Acute hypoxic respiratory failure, resolved- 95-97% on room air  5  SHOAIB on CKD- creatinine stable with diuresis at 2 5 but above previous baseline  May be new baseline, will need follow up with nephrology  6  CAD s/p CABG x2 September 2013- no c/o chest pain  7  Hypertension-125/58  8  Dyslipidemia- lipid panel from June 2021-cholesterol 85, triglycerides 90, HDL 31, LDL 36  On atorvastatin 80 mg daily  9  Bilateral carotid stenosis, PAD s/p LLE revascularization  10  Chronic anemia- hgb 7 1 today, s/p IV iron per primary team   11  Tobacco abuse-smoking 4-5 cigarettes per day, trying to cut back  12  Sinus bradycardia- yesterday noted to have episodes of sinus bradycardia into the 40s-50s with PACs and at times competing junctional beats  HR improved today, NSR with rare sinus bradycardia      Plan  Personally ambulated in haji with patient- O2 sats 95-97% on room air  Minimal dyspnea noted only after completion of walk (approx 150 ft)   Weight down to 167 lb (below previous dry weight)  Start oral diuretics- torsemide 60 mg daily  AM BMP, will need BMP early next week to assess renal function  BP stable on amlodipine and Lasix  Continue to hold lisinopril and hydralazine at d/c    Continue aspirin and statin  Encouraged smoking cessation  Follow up with Dr Destiny Sargent arranged for 4/1/22 to discuss TAVR    Subjective  Breathing feels better, can lay flat to sleep at night  Legs still swollen though  Review of Systems   Constitutional: Negative for chills and malaise/fatigue  Cardiovascular: Positive for dyspnea on exertion (significantly improved) and leg swelling  Negative for chest pain, orthopnea, palpitations and syncope  Respiratory: Negative for cough, shortness of breath, sleep disturbances due to breathing and sputum production  Gastrointestinal: Negative for bloating, nausea and vomiting  Neurological: Negative for dizziness, light-headedness and weakness  All other systems reviewed and are negative  Objective:   Vitals: Blood pressure 120/63, pulse 69, temperature 97 7 °F (36 5 °C), temperature source Oral, resp  rate 20, height 5' 10" (1 778 m), weight 75 8 kg (167 lb 3 2 oz), SpO2 91 % , Body mass index is 23 99 kg/m²  ,     Systolic (64RZJ), FHP:034 , Min:110 , PJL:012     Diastolic (76LSN), WPU:10, Min:54, Max:63    Intake/Output Summary (Last 24 hours) at 3/25/2022 0943  Last data filed at 3/25/2022 0207  Gross per 24 hour   Intake 0 ml   Output 775 ml   Net -775 ml     Wt Readings from Last 3 Encounters:   03/25/22 75 8 kg (167 lb 3 2 oz)   03/11/22 77 2 kg (170 lb 1 6 oz)   03/02/22 77 6 kg (171 lb)     Telemetry Review: NSR, rare sinus bradycardia    Physical Exam  Vitals reviewed  Constitutional:       General: He is not in acute distress  Neck:      Vascular: No hepatojugular reflux or JVD  Cardiovascular:      Rate and Rhythm: Normal rate and regular rhythm  Heart sounds: Murmur (systolic) heard  No friction rub  No gallop  Pulmonary:      Effort: Pulmonary effort is normal  No respiratory distress  Breath sounds: Rales (RLL) present  Comments: 95-97% on room air  Abdominal:      General: Bowel sounds are normal  There is no distension  Palpations: Abdomen is soft  Tenderness: There is no abdominal tenderness  Musculoskeletal:         General: No tenderness  Normal range of motion  Cervical back: Neck supple  Right lower le+ Pitting Edema present  Left lower le+ Pitting Edema present  Skin:     General: Skin is warm and dry  Findings: No erythema  Neurological:      Mental Status: He is alert and oriented to person, place, and time     Psychiatric:         Mood and Affect: Mood normal      LABORATORY RESULTS      CBC with diff:   Results from last 7 days   Lab Units 22  0554 22  0550 22  0510 22  1347   WBC Thousand/uL 5 34 5 92 6 00 6 32   HEMOGLOBIN g/dL 7 1* 7 7* 8 3* 8 5*   HEMATOCRIT % 22 9* 24 3* 25 3* 26 8*   MCV fL 90 88 85 89   PLATELETS Thousands/uL 209 236 230 256   MCH pg 27 7 27 9 27 8 28 3   MCHC g/dL 31 0* 31 7 32 8 31 7   RDW % 15 6* 15 6* 15 4* 15 5*   MPV fL 10 3 10 7 10 5 10 7   NRBC AUTO /100 WBCs  --   --   --  0     CMP:  Results from last 7 days   Lab Units 22  0554 22  0550 22  0510 22  1347   POTASSIUM mmol/L 4 0 3 6 4 4 5 2   CHLORIDE mmol/L 106 104 105 106   CO2 mmol/L  25 24 23   BUN mg/dL 56* 55* 51* 46*   CREATININE mg/dL 2 52* 2 55* 2 42* 2 67*   CALCIUM mg/dL 8 1* 8 3 8 7 8 9   AST U/L  --   --   --  18   ALT U/L  --   --   --  23   ALK PHOS U/L  --   --   --  68   EGFR ml/min/1 73sq m      BMP:  Results from last 7 days   Lab Units 22  0554 22  0550 22  0510 22  1347   POTASSIUM mmol/L 4 0 3 6 4 4 5 2   CHLORIDE mmol/L 106 104 105 106   CO2 mmol/L 25 25 24 23   BUN mg/dL 56* 55* 51* 46*   CREATININE mg/dL 2 52* 2 55* 2 42* 2 67*   CALCIUM mg/dL 8 1* 8 3 8 7 8 9     Lab Results   Component Value Date    CREATININE 2 52 (H) 2022    CREATININE 2 55 (H) 2022    CREATININE 2 42 (H) 2022     Lab Results   Component Value Date    NTBNP 34,910 (H) 2022      Results from last 7 days   Lab Units 22  0510   MAGNESIUM mg/dL 2 2      Results from last 7 days   Lab Units 22  0510   HEMOGLOBIN A1C % 5 6     Results from last 7 days   Lab Units 22  1347   INR  1 11     Lipid Profile:   No results found for: CHOL  Lab Results   Component Value Date    HDL 31 (L) 2021    HDL 39 (L) 09/15/2020    HDL 36 (L) 2020     Lab Results   Component Value Date    LDLCALC 36 2021    LDLCALC 36 09/15/2020    LDLCALC 52 2020     Lab Results   Component Value Date    TRIG 90 2021    TRIG 85 09/15/2020    TRIG 81 2020     Cardiac testing:   Results for orders placed during the hospital encounter of 21    Echo complete with contrast if indicated    Narrative  [place] [address], [address] [facility]  [phone]    Transthoracic Echocardiogram  2D, M-mode, Doppler, and Color Doppler    Study date:  06-May-2021    Patient: Anastasia Mario  MR number: [de-identified]  Account number: [de-identified]  : 1945  Age: 64 years  Gender: Male  Status: Outpatient  Location: [place] [address]   Height: 70 in  Weight: 181 lb  BP: 150/ 58 mmHg    Indications: Assess the aortic valve  Diagnoses: I35 0 - Nonrheumatic aortic (valve) stenosis    Sonographer:  Regis Littlejohn RDCS  Primary Physician:  Billy Baird MD  Referring Physician:  Cathleen Manrique MD  Group:  [facility]  Interpreting Physician:  Yamini Rosado MD    SUMMARY    LEFT VENTRICLE:  Systolic function was normal  Ejection fraction was estimated to be 60 %  There were no regional wall motion abnormalities  Wall thickness was mildly increased  Doppler parameters were consistent with abnormal left ventricular relaxation (grade 1 diastolic dysfunction)  LEFT ATRIUM:  The atrium was mildly dilated  MITRAL VALVE:  There was mild regurgitation  AORTIC VALVE:  The valve was probably trileaflet   Leaflets exhibited moderately increased thickness and moderate calcification  There was severe stenosis  There was trace regurgitation  Valve peak gradient was 63 mmHg  Valve mean gradient was 32 mmHg  Aortic valve area was 1 cmï¾² by the continuity equation  Estimated aortic valve area (by VTI) was 0 91 cmï¾²  TRICUSPID VALVE:  There was trace regurgitation  HISTORY: PRIOR HISTORY: CAD, bradycardia, DM, dyslipidemia, PAD, CABG,    PROCEDURE: The study was performed in the [address]  This was a routine study  The transthoracic approach was used  The study included complete 2D imaging, M-mode, complete spectral Doppler, and color Doppler  The heart rate was  66 bpm, at the start of the study  Images were obtained from the parasternal, apical, subcostal, and suprasternal notch acoustic windows  Image quality was adequate  LEFT VENTRICLE: Size was normal  Systolic function was normal  Ejection fraction was estimated to be 60 %  There were no regional wall motion abnormalities  Wall thickness was mildly increased  DOPPLER: Doppler parameters were consistent  with abnormal left ventricular relaxation (grade 1 diastolic dysfunction)  RIGHT VENTRICLE: The size was normal  Systolic function was normal  Wall thickness was normal     LEFT ATRIUM: The atrium was mildly dilated  RIGHT ATRIUM: Size was normal     MITRAL VALVE: Valve structure was normal  There was normal leaflet separation  DOPPLER: The transmitral velocity was within the normal range  There was no evidence for stenosis  There was mild regurgitation  AORTIC VALVE: The valve was probably trileaflet  Leaflets exhibited moderately increased thickness and moderate calcification  DOPPLER: There was severe stenosis  There was trace regurgitation  TRICUSPID VALVE: The valve structure was normal  There was normal leaflet separation  DOPPLER: The transtricuspid velocity was within the normal range  There was no evidence for stenosis  There was trace regurgitation      PULMONIC VALVE: Leaflets exhibited normal thickness, no calcification, and normal cuspal separation  DOPPLER: The transpulmonic velocity was within the normal range  There was no significant regurgitation  PERICARDIUM: There was no pericardial effusion  The pericardium was normal in appearance  AORTA: The root exhibited normal size  SYSTEMIC VEINS: IVC: The inferior vena cava was normal in size  MEASUREMENT TABLES    2D MEASUREMENTS  LVOT   (Reference normals)  Diam   23 mm   (--)    DOPPLER MEASUREMENTS  LVOT   (Reference normals)  VTI   24 cm   (--)  R-R interval   1017 ms   (--)  HR   59 bpm   (--)  Stroke vol   99 71 ml   (--)  Cardiac output   5 88 L/min   (--)  Cardiac index   2 94 L/min/mï¾²   (--)  Aortic valve   (Reference normals)  VTI   108 cm   (--)  Peak gradient   63 mmHg   (--)  Mean gradient   32 mmHg   (--)  Valve area, cont   1 cmï¾²   (--)  Obstr index, VTI   0 22    (--)  Valve area, VTI   0 91 cmï¾²   (--)  Area index, VTI   0 46 cmï¾²/mï¾²   (--)    SYSTEM MEASUREMENT TABLES    2D  %FS: 23 52 %  Ao Diam: 3 06 cm  EDV(Teich): 137 67 ml  EF(Teich): 46 6 %  ESV(Teich): 73 51 ml  IVSd: 1 26 cm  LA Area: 24 18 cm2  LA Diam: 4 19 cm  LVEDV MOD A4C: 198 32 ml  LVEF MOD A4C: 54 08 %  LVESV MOD A4C: 91 07 ml  LVIDd: 5 34 cm  LVIDs: 4 08 cm  LVLd A4C: 10 15 cm  LVLs A4C: 8 98 cm  LVOT Diam: 2 43 cm  LVPWd: 1 18 cm  RA Area: 14 06 cm2  RVIDd: 3 75 cm  SV MOD A4C: 107 24 ml  SV(Teich): 64 16 ml    CW  AV Env  Ti: 414 84 ms  AV MaxP 54 mmHg  AV VTI: 103 91 cm  AV Vmax: 3 82 m/s  AV Vmean: 2 5 m/s  AV meanP 08 mmHg    MM  TAPSE: 2 13 cm    PW  LUIS (VTI): 1 06 cm2  LUIS Vmax: 1 04 cm2  E' Sept: 0 04 m/s  E/E' Sept: 22 83  LVOT Env  Ti: 482 08 ms  LVOT VTI: 23 7 cm  LVOT Vmax: 0 86 m/s  LVOT Vmean: 0 5 m/s  LVOT maxP 94 mmHg  LVOT meanP 25 mmHg  LVSV Dopp: 110 25 ml  MV A Pop: 1 23 m/s  MV Dec [place]: 5 05 m/s2  MV DecT: 180 67 ms  MV E Pop: 0 91 m/s  MV E/A Ratio: 0 74  MV PHT: 52 39 ms  MVA By PHT: 4 2 cm2    [facility]    Prepared and electronically signed by    Teodora Dubin, MD  Signed 06-May-2021 16:40:31    Meds/Allergies   all current active meds have been reviewed and current meds:   Current Facility-Administered Medications   Medication Dose Route Frequency    acetaminophen (TYLENOL) tablet 650 mg  650 mg Oral Q6H PRN    amLODIPine (NORVASC) tablet 10 mg  10 mg Oral Daily    aspirin (ECOTRIN LOW STRENGTH) EC tablet 81 mg  81 mg Oral Daily    atorvastatin (LIPITOR) tablet 80 mg  80 mg Oral HS    docusate sodium (COLACE) capsule 100 mg  100 mg Oral BID    heparin (porcine) subcutaneous injection 5,000 Units  5,000 Units Subcutaneous Q8H [address]    insulin lispro (HumaLOG) 100 units/mL subcutaneous injection 1-6 Units  1-6 Units Subcutaneous 4x Daily (AC & HS)    nicotine (NICODERM CQ) 7 mg/24hr TD 24 hr patch 1 patch  1 patch Transdermal Daily    ondansetron (ZOFRAN) injection 4 mg  4 mg Intravenous Q6H PRN    pancrelipase (Lip-Prot-Amyl) (CREON) delayed release capsule 24,000 Units  24,000 Units Oral TID With Meals    pantoprazole (PROTONIX) EC tablet 40 mg  40 mg Oral Daily    polyethylene glycol (MIRALAX) packet 17 g  17 g Oral Daily     Medications Prior to Admission   Medication    amLODIPine (NORVASC) 10 mg tablet    AMYLASE-LIPASE-PROTEASE PO    aspirin (ECOTRIN LOW STRENGTH) 81 mg EC tablet    atorvastatin (LIPITOR) 80 mg tablet    benazepril (LOTENSIN) 40 MG tablet    clopidogrel (PLAVIX) 75 mg tablet    Cyanocobalamin (VITAMIN B12 PO)    hydrALAZINE (APRESOLINE) 50 mg tablet    Magnesium Oxide (MAG-200 PO)    metFORMIN (GLUCOPHAGE) 500 mg tablet    Multiple Vitamin (MULTIVITAMIN) tablet    pantoprazole (PROTONIX) 40 mg tablet    Ferrous Sulfate Dried (Feosol) 200 (65 Fe) MG TABS    Fluad Quadrivalent 0 5 ML PRSY     Counseling / Coordination of Care  Total floor / unit time spent today 20 minutes    Greater than 50% of total time was spent with the patient and / or family counseling and / or coordination of care  ** Please Note: Dragon 360 Dictation voice to text software may have been used in the creation of this document   **

## 2022-03-28 ENCOUNTER — TRANSITIONAL CARE MANAGEMENT (OUTPATIENT)
Dept: FAMILY MEDICINE CLINIC | Facility: CLINIC | Age: 77
End: 2022-03-28

## 2022-03-28 ENCOUNTER — APPOINTMENT (OUTPATIENT)
Dept: LAB | Facility: MEDICAL CENTER | Age: 77
End: 2022-03-28
Payer: MEDICARE

## 2022-03-28 DIAGNOSIS — I50.9 ACUTE CHF (CONGESTIVE HEART FAILURE) (HCC): ICD-10-CM

## 2022-03-28 DIAGNOSIS — D63.1 ANEMIA DUE TO STAGE 4 CHRONIC KIDNEY DISEASE (HCC): ICD-10-CM

## 2022-03-28 DIAGNOSIS — N18.4 ANEMIA DUE TO STAGE 4 CHRONIC KIDNEY DISEASE (HCC): ICD-10-CM

## 2022-03-28 DIAGNOSIS — N18.9 ACUTE KIDNEY INJURY SUPERIMPOSED ON CHRONIC KIDNEY DISEASE (HCC): ICD-10-CM

## 2022-03-28 DIAGNOSIS — N17.9 ACUTE KIDNEY INJURY SUPERIMPOSED ON CHRONIC KIDNEY DISEASE (HCC): ICD-10-CM

## 2022-03-28 DIAGNOSIS — D50.8 OTHER IRON DEFICIENCY ANEMIA: ICD-10-CM

## 2022-03-28 LAB
ANION GAP SERPL CALCULATED.3IONS-SCNC: 5 MMOL/L (ref 4–13)
BASOPHILS # BLD AUTO: 0.03 THOUSANDS/ΜL (ref 0–0.1)
BASOPHILS NFR BLD AUTO: 1 % (ref 0–1)
BUN SERPL-MCNC: 58 MG/DL (ref 5–25)
CALCIUM SERPL-MCNC: 9.6 MG/DL (ref 8.3–10.1)
CHLORIDE SERPL-SCNC: 106 MMOL/L (ref 100–108)
CO2 SERPL-SCNC: 29 MMOL/L (ref 21–32)
CREAT SERPL-MCNC: 2.85 MG/DL (ref 0.6–1.3)
EOSINOPHIL # BLD AUTO: 0.12 THOUSAND/ΜL (ref 0–0.61)
EOSINOPHIL NFR BLD AUTO: 2 % (ref 0–6)
ERYTHROCYTE [DISTWIDTH] IN BLOOD BY AUTOMATED COUNT: 17.2 % (ref 11.6–15.1)
FERRITIN SERPL-MCNC: 340 NG/ML (ref 8–388)
GFR SERPL CREATININE-BSD FRML MDRD: 20 ML/MIN/1.73SQ M
GLUCOSE P FAST SERPL-MCNC: 142 MG/DL (ref 65–99)
HCT VFR BLD AUTO: 27.4 % (ref 36.5–49.3)
HGB BLD-MCNC: 8.1 G/DL (ref 12–17)
IMM GRANULOCYTES # BLD AUTO: 0.03 THOUSAND/UL (ref 0–0.2)
IMM GRANULOCYTES NFR BLD AUTO: 1 % (ref 0–2)
IRON SATN MFR SERPL: 12 % (ref 20–50)
IRON SERPL-MCNC: 43 UG/DL (ref 65–175)
LYMPHOCYTES # BLD AUTO: 1.19 THOUSANDS/ΜL (ref 0.6–4.47)
LYMPHOCYTES NFR BLD AUTO: 23 % (ref 14–44)
MCH RBC QN AUTO: 27.4 PG (ref 26.8–34.3)
MCHC RBC AUTO-ENTMCNC: 29.6 G/DL (ref 31.4–37.4)
MCV RBC AUTO: 93 FL (ref 82–98)
MONOCYTES # BLD AUTO: 0.51 THOUSAND/ΜL (ref 0.17–1.22)
MONOCYTES NFR BLD AUTO: 10 % (ref 4–12)
NEUTROPHILS # BLD AUTO: 3.31 THOUSANDS/ΜL (ref 1.85–7.62)
NEUTS SEG NFR BLD AUTO: 63 % (ref 43–75)
NRBC BLD AUTO-RTO: 0 /100 WBCS
PLATELET # BLD AUTO: 285 THOUSANDS/UL (ref 149–390)
PMV BLD AUTO: 10.7 FL (ref 8.9–12.7)
POTASSIUM SERPL-SCNC: 4.3 MMOL/L (ref 3.5–5.3)
RBC # BLD AUTO: 2.96 MILLION/UL (ref 3.88–5.62)
SODIUM SERPL-SCNC: 140 MMOL/L (ref 136–145)
TIBC SERPL-MCNC: 364 UG/DL (ref 250–450)
WBC # BLD AUTO: 5.19 THOUSAND/UL (ref 4.31–10.16)

## 2022-03-28 PROCEDURE — 85025 COMPLETE CBC W/AUTO DIFF WBC: CPT

## 2022-03-28 PROCEDURE — 36415 COLL VENOUS BLD VENIPUNCTURE: CPT

## 2022-03-28 PROCEDURE — 80048 BASIC METABOLIC PNL TOTAL CA: CPT

## 2022-03-28 PROCEDURE — 83540 ASSAY OF IRON: CPT

## 2022-03-28 PROCEDURE — 83550 IRON BINDING TEST: CPT

## 2022-03-28 PROCEDURE — 82728 ASSAY OF FERRITIN: CPT

## 2022-03-29 ENCOUNTER — TELEPHONE (OUTPATIENT)
Dept: NEPHROLOGY | Facility: CLINIC | Age: 77
End: 2022-03-29

## 2022-03-29 ENCOUNTER — PATIENT OUTREACH (OUTPATIENT)
Dept: FAMILY MEDICINE CLINIC | Facility: CLINIC | Age: 77
End: 2022-03-29

## 2022-03-29 DIAGNOSIS — I50.9 ACUTE CHF (CONGESTIVE HEART FAILURE) (HCC): ICD-10-CM

## 2022-03-29 NOTE — PROGRESS NOTES
Introduced self to patient  Reviewed Bundled program  He agrees to outreach calls for complex care management  Provided my name and contact number  Reviewed hospital AVS  Verified he has all medications in the home  Able to teach back the medications on hold until he sees Cardiology, Benazepril and Hydralazine  He is taking Torsemide 20mg, three tablets to equal 60mg daily  Has a cardiology appointment 3/31/22 and CT surgery appointment 4/1/22  He is able to drive himself  Lives in 2nd floor of two story home  Niece lives on first level, his brother lives next door  He is following a low sodium diet  He reports he is restricted to 2liters of fluid a day  Has a scale in the home  Baseline weight now 167lbs  Using teach back reviewed with patient to weigh self daily and record  If weight gain 3lbs/day or 5 lbs/week to report to Cardiology  Had bloodwork BMP drawn 3/28/22  Reminded patient to call and schedule Nephrology appointment to occur within the next two weeks  He will call today to schedule  Reviewed avoidance of NSAIDs  Patient was asking questions about needing help with personal care "down the road"  We discussed in home personal care givers, assisted living facilities and [employer] for Aid and Attendance  At this time patient is independent with his personal care  All questions answered at this time, will follow up in one week

## 2022-03-29 NOTE — TELEPHONE ENCOUNTER
Called him back stating we would be calling him for his May f/u and that in his discharge papers it stated just to f/u with us not a HFU as he stated

## 2022-03-29 NOTE — TELEPHONE ENCOUNTER
Called for a HFU, only needs a f/u as we did not see him in the hospital  He is on recall for May  Waiting on Andrade Pace to see if we can fit him in anywhere as there are no openings

## 2022-03-30 RX ORDER — TORSEMIDE 60 MG/1
TABLET, FILM COATED ORAL
Qty: 90 TABLET | Refills: 1 | Status: SHIPPED | OUTPATIENT
Start: 2022-03-30 | End: 2022-03-31 | Stop reason: ALTCHOICE

## 2022-03-30 NOTE — PROGRESS NOTES
Cardiology  Heart Failure   Follow Up Office Visit Note -     Jesus Oliva    76 y o    male   MRN: [id number]  [address]  [address] [place] [address] [place]  [id number] [phone]    PCP: Richelle Larios MD  Cardiologist : Dr Salas Ring: Dr Eben Moe              Summary of Recommendations  -Low-sodium diet, Heart failure education as below  Last night he was eating some Mell [employer]  I discussed how to read food labels to facilitate adherence    -Of note, he has CKD 4 and he is on metformin  Will have him stop it and reach out to his PCP to determine if he needs alternative Rx  Last hgA1c 5 6  -decrease torsemide to 20 mg b i d , given his renal function  I suspect his diet will improved as well, with left sodium  -Follow-up CT surgery tomorrow- evaluation for TAVR  -24 hr Holter monitor  Of note at rest, he has sinus bradycardia on no AV dustin agents     -Follow up will be scheduled with Dr Broussard [place] 6 weeks [place]  -recommend he see Nephrology given his worsening kidney disease, last creatinine 2 8  -Colon Ca screenin2021 , up-to-date      Impression/plan  Chronic diastolic heart failure  Recent adm 3/22-3/25/22 for decompensation secondary to severe aortic stenosis  Wt Readings from Last 3 Encounters:   22 75 3 kg (166 lb)   22 75 8 kg (167 lb 3 2 oz)   22 77 2 kg (170 lb 1 6 oz)     --beta-blocker:   none given history of bradycardia  --Diuretic:   torsemide 60 mg daily, non previously  today, will decrease this to 20 mg b i d   No potassium supplement  He has been taking magnesium on his own once a week  Will check a BMP and Mag level  --ACE/ARB/ARNI:   benazepril was stopped given worsening CKD  --MRA:   --2 g sodium diet, 1800 cc fluid restriction  Daily weights, call weight gain 2-3 lb in 1 day or 5 lb in 5 days  Aortic stenosis, severe    He has appointment with CT surgery tomorrow evaluation TAVR  Moderate MR  EF 50%  CAD status post CABG x2  [id number] aspirin, Plavix, statin  Sinus bradycardia- while hospitalized on telemetry:  Episodes of  sinus bradycardia into the 40s-50s with PACs and at times competing junctional beats  Will check a 24 Holter monitor  Hypertension, essential  /58   Now off hydralazine and benazepril  Hyperlipidemia  On atorvastatin 80 mg/d  June 2021:  LDL 36  CKD  Baseline creatinine 1 76-1 8  Worsening in the setting of acute diastolic heart failure now requiring diuretics  Last cr 2 85  Follows with Nephrology, Swathi Issa- last seen 1/2022  PVD  · Bilateral carotid stenosis  · Status post left lower extremity revascularization  Iron deficiency anemia, chronic  hemoglobin  8 1, was 7 1 3/25/22/  On iron  History ischemic colitis  Tobacco abuse ongoing  cessation imperative  He has cut down to about 4-5 cigarettes a day  Encouraged to cut back further with the aim of quitting completely  Cardiac testing  · TTE 5/6/21 EF 60%  No are WMA  Grade 1 DD  Mild left atrial enlargement  Mild MR  Severe aortic stenosis  Peak gradient 63 mean gradient 32  LUIS 1 cm2 by the continuity equation   TTE 3/24/22 EF 50%  Grade 2 DD  Mild LVH  Moderate hypokinesis of the basal to mid inferior wall  RV size and function normal   Mild left atrial enlargement  Mild AI, Severe aortic stenosis  Moderate MR                     HPI  Hao Mora  is a 76y o  year old male with CAD, AS, PVD, HTN, HL, CKD, ongoing tobacco abuse and chronic anemia  He underwent CABG x2 September 2013  His ejection fraction at that time was preserved  He has had left lower extremity revascularization and carotid disease, followed by vascular surgery  He has also had sinus bradycardia which has improved, with the discontinuation of beta-blockers  Due to progressive severe symptomatic AS he was referred to CT surgery for evaluation for TAVR    He was seen in the office September 2020 by   Bryson Kessler  At that time the patient decided to pursue evaluation of neurological complaints prior to consideration for TAVR  He has followed with Dr Jina Melchor  He was last seen in the office 3/2/22  Encouraged follow-up with CT surgery  Home meds include amlodipine 10 mg daily, benazepril 40 mg daily, and hydralazine 50 mg t i d   Patient not on diuretics at home    Adm 3/22-3/25/22  CC:  Shortness of breath, DEMARCO times 1-2 weeks, lower extremity edema, orthopnea  HsTn : 674 --> 588 --> 727  ECG - NSR, LVh with repolarization abnormalities  CXR -vascular congestion and small right effusion, NT proBNP 34 000  Creatinine was 2 67, from baseline of 1 7-1 8  Diuresed with Lasix 80 IV b i d   Echo: EF 50, grade 2 DD  Severe AS  Discharge creatinine :  2 5 to  Discharge weight 167 lb   Discharge diuretic:  Torsemide 60 mg daily  Hydralazine and benazepril were discontinued      3/28/22   labs: Cr 2 85, BUN 58  K 4 3  Hemoglobin 8 1 hematocrit 27 4  H&H March 25th were 7 1/ 22 9      3/31/22  Hospital follow-up: acute diastolic heart failure secondary to severe aortic stenosis, CKD 4  EF 50  CTS eval 4/22  ROS:  Overall he tells me his breathing has improved  No chest pain  He is able to lie flat, no orthopnea  DEMARCO with steps  No CP  No lightheadedness or dizziness  He is quite aware of his labs, and concerned about his renal function   His weight is down  Wt Readings from Last 3 Encounters:   03/31/22 75 3 kg (166 lb)   03/25/22 75 8 kg (167 lb 3 2 oz)   03/11/22 77 2 kg (170 lb 1 6 oz)   Blood pressure 110/58  Heart rate today 59 and regular by exam  Social history:  I suspect some sodium dietary indiscretion in the past   We discussed the importance of a salt restricted diet today  We reviewed his most recent labs, concern is his renal function, and anemia, bradycardia while hospitalized  He is not taking the iron supplement because it is constipating     No melena or hematochezia  Today will decrease his torsemide to 20 mg b i d , reassess his labs in 1 week   In review his meds he is also still on metformin given his CKD, recommend he stop it and follow-up with his PCP as well as his nephrologist      I have spent 40minutes with Patient  today in which greater than 50% of this time was spent in counseling/coordination of care regarding Intructions for management, Patient and family education, Importance of tx compliance and Risk factor reductions  Assessment  Diagnoses and all orders for this visit:    Hospital discharge follow-up    Chronic heart failure with preserved ejection fraction (HCC)  -     torsemide (DEMADEX) 20 mg tablet; Take 1 tablet (20 mg total) by mouth 2 (two) times a day  -     Basic metabolic panel; Future  -     Magnesium; Future    Coronary artery disease involving native heart without angina pectoris, unspecified vessel or lesion type    Nonrheumatic aortic valve stenosis    Primary hypertension    Asymptomatic bilateral carotid artery stenosis    Cigarette nicotine dependence with nicotine-induced disorder    Mixed hyperlipidemia    Stage 4 chronic kidney disease ([facility])    Bradycardia  -     Holter monitor; Future        Past Medical History:   Diagnosis Date    Arteriosclerosis of arteries of extremities ([facility] )     CAD (coronary artery disease)     Chronic kidney disease     [facility]     Dyslipidemia     Endocrine pancreas disorder     GERD (gastroesophageal reflux disease)     Hyperlipidemia     Hypertension     Kidney stones     PAD (peripheral artery disease) ([facility])        Review of Systems   Constitutional: Negative for chills  Cardiovascular: Positive for dyspnea on exertion  Negative for chest pain, claudication, cyanosis, irregular heartbeat, leg swelling, near-syncope, orthopnea, palpitations, paroxysmal nocturnal dyspnea and syncope  Respiratory: Negative for cough and shortness of breath  Gastrointestinal: Negative for heartburn and nausea  Neurological: Negative for dizziness, focal weakness, headaches, light-headedness and weakness  All other systems reviewed and are negative  No Known Allergies        Current Outpatient Medications:     amLODIPine (NORVASC) 10 mg tablet, TAKE 1 TABLET DAILY, Disp: 90 tablet, Rfl: 3    AMYLASE-LIPASE-PROTEASE PO, Take by mouth , Disp: , Rfl:     aspirin (ECOTRIN LOW STRENGTH) 81 mg EC tablet, Take 81 mg by mouth daily, Disp: , Rfl:     atorvastatin (LIPITOR) 80 mg tablet, TAKE 1 TABLET DAILY AT     BEDTIME, Disp: 90 tablet, Rfl: 3    clopidogrel (PLAVIX) 75 mg tablet, TAKE 1 TABLET DAILY, Disp: 90 tablet, Rfl: 3    Cyanocobalamin (VITAMIN B12 PO), Take by mouth once a week , Disp: , Rfl:     Magnesium Oxide (MAG-200 PO), Take by mouth once a week , Disp: , Rfl:     Multiple Vitamin (MULTIVITAMIN) tablet, Take 1 tablet by mouth daily 1-2 times a week , Disp: , Rfl:     pantoprazole (PROTONIX) 40 mg tablet, Take 1 tablet (40 mg total) by mouth daily, Disp: 90 tablet, Rfl: 1    Ferrous Sulfate Dried (Feosol) 200 (65 Fe) MG TABS, Take 200 mg by mouth every other day (Patient not taking: Reported on 3/31/2022 ), Disp: , Rfl:     Fluad Quadrivalent 0 5 ML PRSY, PHARMACY ADMINISTERED (Patient not taking: Reported on 3/31/2022), Disp: , Rfl:     torsemide (DEMADEX) 20 mg tablet, Take 1 tablet (20 mg total) by mouth 2 (two) times a day, Disp: 180 tablet, Rfl: 3    Social History     Socioeconomic History    Marital status:      Spouse name: Not on file    Number of children: Not on file    Years of education: Not on file    Highest education level: Not on file   Occupational History    Not on file   Tobacco Use    Smoking status: Current Every Day Smoker     Packs/day: 1 00     Years: 50 00     Pack years: 50 00     Types: Cigarettes    Smokeless tobacco: Never Used    Tobacco comment: 4-5 cigarettes daily    Vaping Use    Vaping Use: Never used   Substance and Sexual Activity    Alcohol use: Not Currently     Comment: occasional    Drug use: No    Sexual activity: Not on file   Other Topics Concern    Not on file   Social History Narrative    · Most recent tobacco use screenin2018      · Do you currently or have you served in the [employer]: Yes      · If Yes, What branch of service:   [employer]      · Occupation:   Dentists      · Exercise level:   Occasional        · Caffeine intake:   Heavy      · Marital status:         · Diet:   Regular  low fat     · Seat belts used routinely:   Yes      · Smoke alarm in home: Yes      · Advance directive: Yes      · General stress level:   Low      Social Determinants of Health     Financial Resource Strain: Not on file   Food Insecurity: No Food Insecurity    Worried About Running Out of Food in the Last Year: Never true    Noris of Food in the Last Year: Never true   Transportation Needs: No Transportation Needs    Lack of Transportation (Medical): No    Lack of Transportation (Non-Medical): No   Physical Activity: Not on file   Stress: Not on file   Social Connections: Not on file   Intimate Partner Violence: Not on file   Housing Stability: Unknown    Unable to Pay for Housing in the Last Year: Not on file    Number of Places Lived in the Last Year: 1    Unstable Housing in the Last Year: No       Family History   Problem Relation Age of Onset    Heart attack Father     Other Sister         bypass and vlave replacement    Stroke Paternal Uncle     Arrhythmia Neg Hx     Asthma Neg Hx     Clotting disorder Neg Hx     Fainting Neg Hx     Anuerysm Neg Hx     Hypertension Neg Hx         unsure     Hyperlipidemia Neg Hx     Heart failure Neg Hx        Physical Exam  Vitals and nursing note reviewed  Constitutional:       General: He is not in acute distress  HENT:      Head: Normocephalic and atraumatic     Eyes:      Conjunctiva/sclera: Conjunctivae normal    Cardiovascular:      Rate and Rhythm: Regular rhythm  Bradycardia present  Pulses: Intact distal pulses  Heart sounds: Murmur heard  Harsh midsystolic murmur is present with a grade of 3/6 at the upper right sternal border radiating to the neck  Pulmonary:      Effort: Pulmonary effort is normal       Breath sounds: Normal breath sounds  Abdominal:      General: Bowel sounds are normal       Palpations: Abdomen is soft  Musculoskeletal:         General: Normal range of motion  Cervical back: Normal range of motion and neck supple  Skin:     General: Skin is warm and dry  Neurological:      Mental Status: He is alert and oriented to person, place, and time  Vitals: Blood pressure 110/58, pulse 59, height 5' 10" (1 778 m), weight 75 3 kg (166 lb), SpO2 97 %  Wt Readings from Last 3 Encounters:   22 75 3 kg (166 lb)   22 75 8 kg (167 lb 3 2 oz)   22 77 2 kg (170 lb 1 6 oz)         Labs & Results:  Lab Results   Component Value Date    WBC 5 19 2022    HGB 8 1 (L) 2022    HCT 27 4 (L) 2022    MCV 93 2022     2022     No results found for: BNP  No components found for: CHEM    Results for orders placed during the hospital encounter of 21    Echo complete with contrast if indicated    Narrative  [place] [address] [address] [place] [address]  [phone]    Transthoracic Echocardiogram  2D, M-mode, Doppler, and Color Doppler    Study date:  06-May-2021    Patient: Elizabeth Galicia  MR number: [de-identified]  Account number: [de-identified]  : 1945  Age: 64 years  Gender: Male  Status: Outpatient  Location: [place] [address]   Height: 70 in  Weight: 181 lb  BP: 150/ 58 mmHg    Indications: Assess the aortic valve      Diagnoses: I35 0 - Nonrheumatic aortic (valve) stenosis    Sonographer:  Luis Mason RDCS  Primary Physician:  Fawn Gonzalez MD  Referring Physician:  Fidelina Chavarria MD  Group:  [facility]  Interpreting Physician:  Amisha Bonilla MD    SUMMARY    LEFT VENTRICLE:  Systolic function was normal  Ejection fraction was estimated to be 60 %  There were no regional wall motion abnormalities  Wall thickness was mildly increased  Doppler parameters were consistent with abnormal left ventricular relaxation (grade 1 diastolic dysfunction)  LEFT ATRIUM:  The atrium was mildly dilated  MITRAL VALVE:  There was mild regurgitation  AORTIC VALVE:  The valve was probably trileaflet  Leaflets exhibited moderately increased thickness and moderate calcification  There was severe stenosis  There was trace regurgitation  Valve peak gradient was 63 mmHg  Valve mean gradient was 32 mmHg  Aortic valve area was 1 cmï¾² by the continuity equation  Estimated aortic valve area (by VTI) was 0 91 cmï¾²  TRICUSPID VALVE:  There was trace regurgitation  HISTORY: PRIOR HISTORY: CAD, bradycardia, DM, dyslipidemia, PAD, CABG,    PROCEDURE: The study was performed in the [place] [address]  This was a routine study  The transthoracic approach was used  The study included complete 2D imaging, M-mode, complete spectral Doppler, and color Doppler  The heart rate was  66 bpm, at the start of the study  Images were obtained from the parasternal, apical, subcostal, and suprasternal notch acoustic windows  Image quality was adequate  LEFT VENTRICLE: Size was normal  Systolic function was normal  Ejection fraction was estimated to be 60 %  There were no regional wall motion abnormalities  Wall thickness was mildly increased  DOPPLER: Doppler parameters were consistent  with abnormal left ventricular relaxation (grade 1 diastolic dysfunction)  RIGHT VENTRICLE: The size was normal  Systolic function was normal  Wall thickness was normal     LEFT ATRIUM: The atrium was mildly dilated  RIGHT ATRIUM: Size was normal     MITRAL VALVE: Valve structure was normal  There was normal leaflet separation   DOPPLER: The transmitral velocity was within the normal range  There was no evidence for stenosis  There was mild regurgitation  AORTIC VALVE: The valve was probably trileaflet  Leaflets exhibited moderately increased thickness and moderate calcification  DOPPLER: There was severe stenosis  There was trace regurgitation  TRICUSPID VALVE: The valve structure was normal  There was normal leaflet separation  DOPPLER: The transtricuspid velocity was within the normal range  There was no evidence for stenosis  There was trace regurgitation  PULMONIC VALVE: Leaflets exhibited normal thickness, no calcification, and normal cuspal separation  DOPPLER: The transpulmonic velocity was within the normal range  There was no significant regurgitation  PERICARDIUM: There was no pericardial effusion  The pericardium was normal in appearance  AORTA: The root exhibited normal size  SYSTEMIC VEINS: IVC: The inferior vena cava was normal in size      MEASUREMENT TABLES    2D MEASUREMENTS  LVOT   (Reference normals)  Diam   23 mm   (--)    DOPPLER MEASUREMENTS  LVOT   (Reference normals)  VTI   24 cm   (--)  R-R interval   1017 ms   (--)  HR   59 bpm   (--)  Stroke vol   99 71 ml   (--)  Cardiac output   5 88 L/min   (--)  Cardiac index   2 94 L/min/mï¾²   (--)  Aortic valve   (Reference normals)  VTI   108 cm   (--)  Peak gradient   63 mmHg   (--)  Mean gradient   32 mmHg   (--)  Valve area, cont   1 cmï¾²   (--)  Obstr index, VTI   0 22    (--)  Valve area, VTI   0 91 cmï¾²   (--)  Area index, VTI   0 46 cmï¾²/mï¾²   (--)    SYSTEM MEASUREMENT TABLES    2D  %FS: 23 52 %  Ao Diam: 3 06 cm  EDV(Teich): 137 67 ml  EF(Teich): 46 6 %  ESV(Teich): 73 51 ml  IVSd: 1 26 cm  LA Area: 24 18 cm2  LA Diam: 4 19 cm  LVEDV MOD A4C: 198 32 ml  LVEF MOD A4C: 54 08 %  LVESV MOD A4C: 91 07 ml  LVIDd: 5 34 cm  LVIDs: 4 08 cm  LVLd A4C: 10 15 cm  LVLs A4C: 8 98 cm  LVOT Diam: 2 43 cm  LVPWd: 1 18 cm  RA Area: 14 06 cm2  RVIDd: 3 75 cm  SV MOD A4C: 107 24 ml  SV(Teich): 64 16 ml    CW  AV Env  Ti: 414 84 ms  AV MaxP 54 mmHg  AV VTI: 103 91 cm  AV Vmax: 3 82 m/s  AV Vmean: 2 5 m/s  AV meanP 08 mmHg    MM  TAPSE: 2 13 cm    PW  LUIS (VTI): 1 06 cm2  LUIS Vmax: 1 04 cm2  E' Sept: 0 04 m/s  E/E' Sept: 22 83  LVOT Env  Ti: 482 08 ms  LVOT VTI: 23 7 cm  LVOT Vmax: 0 86 m/s  LVOT Vmean: 0 5 m/s  LVOT maxP 94 mmHg  LVOT meanP 25 mmHg  LVSV Dopp: 110 25 ml  MV A Pop: 1 23 m/s  MV Dec [place]: 5 05 m/s2  MV DecT: 180 67 ms  MV E Pop: 0 91 m/s  MV E/A Ratio: 0 74  MV PHT: 52 39 ms  MVA By PHT: 4 2 cm2    [facility]    Prepared and electronically signed by    Prashanth Simmons MD  Signed 06-May-2021 16:40:31    No results found for this or any previous visit  This note was completed in part utilizing m-modal fluency direct voice recognition software  Grammatical errors, random word insertion, spelling mistakes, and incomplete sentences may be an occasional consequence of the system secondary to software limitations, ambient noise and hardware issues  At the time of dictation, efforts were made to edit, clarify and /or correct errors  Please read the chart carefully and recognize, using context, where substitutions have occurred    If you have any questions or concerns about the context, text or information contained within the body of this dictation, please contact myself, the provider, for further clarification

## 2022-03-31 ENCOUNTER — TELEPHONE (OUTPATIENT)
Dept: NEPHROLOGY | Facility: CLINIC | Age: 77
End: 2022-03-31

## 2022-03-31 ENCOUNTER — OFFICE VISIT (OUTPATIENT)
Dept: CARDIOLOGY CLINIC | Facility: CLINIC | Age: 77
End: 2022-03-31
Payer: MEDICARE

## 2022-03-31 VITALS
BODY MASS INDEX: 23.77 KG/M2 | HEIGHT: 70 IN | DIASTOLIC BLOOD PRESSURE: 58 MMHG | WEIGHT: 166 LBS | OXYGEN SATURATION: 97 % | HEART RATE: 59 BPM | SYSTOLIC BLOOD PRESSURE: 110 MMHG

## 2022-03-31 DIAGNOSIS — I10 PRIMARY HYPERTENSION: ICD-10-CM

## 2022-03-31 DIAGNOSIS — Z09 HOSPITAL DISCHARGE FOLLOW-UP: Primary | ICD-10-CM

## 2022-03-31 DIAGNOSIS — N18.4 STAGE 4 CHRONIC KIDNEY DISEASE (HCC): ICD-10-CM

## 2022-03-31 DIAGNOSIS — I65.23 ASYMPTOMATIC BILATERAL CAROTID ARTERY STENOSIS: Chronic | ICD-10-CM

## 2022-03-31 DIAGNOSIS — F17.219 CIGARETTE NICOTINE DEPENDENCE WITH NICOTINE-INDUCED DISORDER: ICD-10-CM

## 2022-03-31 DIAGNOSIS — N18.32 STAGE 3B CHRONIC KIDNEY DISEASE (HCC): Primary | ICD-10-CM

## 2022-03-31 DIAGNOSIS — I25.10 CORONARY ARTERY DISEASE INVOLVING NATIVE HEART WITHOUT ANGINA PECTORIS, UNSPECIFIED VESSEL OR LESION TYPE: ICD-10-CM

## 2022-03-31 DIAGNOSIS — I50.32 CHRONIC HEART FAILURE WITH PRESERVED EJECTION FRACTION (HCC): ICD-10-CM

## 2022-03-31 DIAGNOSIS — I35.0 NONRHEUMATIC AORTIC VALVE STENOSIS: ICD-10-CM

## 2022-03-31 DIAGNOSIS — E78.2 MIXED HYPERLIPIDEMIA: ICD-10-CM

## 2022-03-31 DIAGNOSIS — R00.1 BRADYCARDIA: ICD-10-CM

## 2022-03-31 PROBLEM — I50.9 ACUTE CHF (CONGESTIVE HEART FAILURE) (HCC): Status: RESOLVED | Noted: 2022-03-22 | Resolved: 2022-03-31

## 2022-03-31 PROCEDURE — 99215 OFFICE O/P EST HI 40 MIN: CPT | Performed by: NURSE PRACTITIONER

## 2022-03-31 RX ORDER — TORSEMIDE 20 MG/1
20 TABLET ORAL 2 TIMES DAILY
Qty: 180 TABLET | Refills: 3 | Status: SHIPPED | OUTPATIENT
Start: 2022-03-31 | End: 2022-07-07 | Stop reason: SDUPTHER

## 2022-03-31 NOTE — LETTER
2022     Sanchez Espinoza MD  [id number] Formerly named [facility] [address] [address]    Patient: Una Watson  YOB: 1945   Date of Visit: 3/31/2022       Dear Dr Zach Munoz: Thank you for referring Carlos Alberto Ramirez to me for evaluation  Below are my notes for this consultation  If you have questions, please do not hesitate to call me  I look forward to following your patient along with you  Sincerely,        ZHANNA Johnson        CC: MD Adan Keating MD Allena Levo, [place]  3/31/2022 11:10 AM  Sign when Signing Visit  Cardiology  Heart Failure   Follow Up Office Visit Note -     Una Watson    76 y o    male   MRN: [id number]  [address] [facility] [address] [id number] [phone]    PCP: Sanchez Espinoza MD  Cardiologist : Dr Vane Hernandez: Dr Tomás Stubbs              Summary of Recommendations  -Low-sodium diet, Heart failure education as below  Last night he was eating some [employer]  I discussed how to read food labels to facilitate adherence    -Of note, he has CKD 4 and he is on metformin  Will have him stop it and reach out to his PCP to determine if he needs alternative Rx  Last hgA1c 5 6  -decrease torsemide to 20 mg b i d , given his renal function  I suspect his diet will improved as well, with left sodium  -Follow-up CT surgery tomorrow- evaluation for TAVR  -24 hr Holter monitor  Of note at rest, he has sinus bradycardia on no AV dustin agents     -Follow up will be scheduled with Dr Charleen Canchola 6 weeks [place]  -recommend he see Nephrology given his worsening kidney disease, last creatinine 2 8  -Colon Ca screenin2021 , up-to-date      Impression/plan  Chronic diastolic heart failure    Recent adm 3/22-3/25/22 for decompensation secondary to severe aortic stenosis  Wt Readings from Last 3 Encounters:   22 75 3 kg (166 lb)   22 75 8 kg (167 lb 3 2 oz)   22 77 2 kg (170 lb 1 6 oz)     --beta-blocker:   none given history of bradycardia  --Diuretic:   torsemide 60 mg daily, non previously  today, will decrease this to 20 mg b i d   No potassium supplement  He has been taking magnesium on his own once a week  Will check a BMP and Mag level  --ACE/ARB/ARNI:   benazepril was stopped given worsening CKD  --MRA:   --2 g sodium diet, 1800 cc fluid restriction  Daily weights, call weight gain 2-3 lb in 1 day or 5 lb in 5 days  Aortic stenosis, severe  He has appointment with CT surgery tomorrow evaluation TAVR  Moderate MR  EF 50%  CAD status post CABG x2  9/20131On aspirin, Plavix, statin  Sinus bradycardia- while hospitalized on telemetry:  Episodes of  sinus bradycardia into the 40s-50s with PACs and at times competing junctional beats  Will check a 24 Holter monitor  Hypertension, essential  /58   Now off hydralazine and benazepril  Hyperlipidemia  On atorvastatin 80 mg/d  June 2021:  LDL 36  CKD  Baseline creatinine 1 76-1 8  Worsening in the setting of acute diastolic heart failure now requiring diuretics  Last cr 2 85  Follows with Nephrology, Dedra Sommers- last seen 1/2022  PVD  · Bilateral carotid stenosis  · Status post left lower extremity revascularization  Iron deficiency anemia, chronic  hemoglobin  8 1, was 7 1 3/25/22/  On iron  History ischemic colitis  Tobacco abuse ongoing  cessation imperative  He has cut down to about 4-5 cigarettes a day  Encouraged to cut back further with the aim of quitting completely  Cardiac testing  · TTE 5/6/21 EF 60%  No are WMA  Grade 1 DD  Mild left atrial enlargement  Mild MR  Severe aortic stenosis  Peak gradient 63 mean gradient 32  LUIS 1 cm2 by the continuity equation   TTE 3/24/22 EF 50%  Grade 2 DD  Mild LVH  Moderate hypokinesis of the basal to mid inferior wall  RV size and function normal   Mild left atrial enlargement  Mild AI, Severe aortic stenosis    Moderate MR ANNA Hartman  is a 76y o  year old male with CAD, AS, PVD, HTN, HL, CKD, ongoing tobacco abuse and chronic anemia  He underwent CABG x2 September 2013  His ejection fraction at that time was preserved  He has had left lower extremity revascularization and carotid disease, followed by vascular surgery  He has also had sinus bradycardia which has improved, with the discontinuation of beta-blockers  Due to progressive severe symptomatic AS he was referred to CT surgery for evaluation for TAVR  He was seen in the office September 2020 by Dr Josee Schmid  At that time the patient decided to pursue evaluation of neurological complaints prior to consideration for TAVR  He has followed with Dr Maliha Jimenez  He was last seen in the office 3/2/22  Encouraged follow-up with CT surgery  Home meds include amlodipine 10 mg daily, benazepril 40 mg daily, and hydralazine 50 mg t i d   Patient not on diuretics at home    Adm 3/22-3/25/22  CC:  Shortness of breath, DEMARCO times 1-2 weeks, lower extremity edema, orthopnea  HsTn : 674 --> 588 --> 727  ECG - NSR, LVh with repolarization abnormalities  CXR -vascular congestion and small right effusion, NT proBNP 34 000  Creatinine was 2 67, from baseline of 1 7-1 8  Diuresed with Lasix 80 IV b i d   Echo: EF 50, grade 2 DD  Severe AS  Discharge creatinine :  2 5 to  Discharge weight 167 lb   Discharge diuretic:  Torsemide 60 mg daily  Hydralazine and benazepril were discontinued      3/28/22   labs: Cr 2 85, BUN 58  K 4 3  Hemoglobin 8 1 hematocrit 27 4  H&H March 25th were 7 1/ 22 9      3/31/22  Hospital follow-up: acute diastolic heart failure secondary to severe aortic stenosis, CKD 4  EF 50  CTS eval 4/22  ROS:  Overall he tells me his breathing has improved  No chest pain  He is able to lie flat, no orthopnea  DEMARCO with steps  No CP  No lightheadedness or dizziness    He is quite aware of his labs, and concerned about his renal function   His weight is down  Wt Readings from Last 3 Encounters:   03/31/22 75 3 kg (166 lb)   03/25/22 75 8 kg (167 lb 3 2 oz)   03/11/22 77 2 kg (170 lb 1 6 oz)   Blood pressure 110/58  Heart rate today 59 and regular by exam  Social history:  I suspect some sodium dietary indiscretion in the past   We discussed the importance of a salt restricted diet today  We reviewed his most recent labs, concern is his renal function, and anemia, bradycardia while hospitalized  He is not taking the iron supplement because it is constipating   No melena or hematochezia  Today will decrease his torsemide to 20 mg b i d , reassess his labs in 1 week   In review his meds he is also still on metformin given his CKD, recommend he stop it and follow-up with his PCP as well as his nephrologist      I have spent 40minutes with Patient  today in which greater than 50% of this time was spent in counseling/coordination of care regarding Intructions for management, Patient and family education, Importance of tx compliance and Risk factor reductions  Assessment  Diagnoses and all orders for this visit:    Hospital discharge follow-up    Chronic heart failure with preserved ejection fraction (HCC)  -     torsemide (DEMADEX) 20 mg tablet; Take 1 tablet (20 mg total) by mouth 2 (two) times a day  -     Basic metabolic panel; Future  -     Magnesium; Future    Coronary artery disease involving native heart without angina pectoris, unspecified vessel or lesion type    Nonrheumatic aortic valve stenosis    Primary hypertension    Asymptomatic bilateral carotid artery stenosis    Cigarette nicotine dependence with nicotine-induced disorder    Mixed hyperlipidemia    Stage 4 chronic kidney disease (HCC)    Bradycardia  -     Holter monitor;  Future        Past Medical History:   Diagnosis Date    Arteriosclerosis of arteries of extremities ([facility] )     CAD (coronary artery disease)     Chronic kidney disease     Diabetes ([facility] )     Dyslipidemia     Endocrine pancreas disorder     GERD (gastroesophageal reflux disease)     Hyperlipidemia     Hypertension     Kidney stones     PAD (peripheral artery disease) ([facility])        Review of Systems   Constitutional: Negative for chills  Cardiovascular: Positive for dyspnea on exertion  Negative for chest pain, claudication, cyanosis, irregular heartbeat, leg swelling, near-syncope, orthopnea, palpitations, paroxysmal nocturnal dyspnea and syncope  Respiratory: Negative for cough and shortness of breath  Gastrointestinal: Negative for heartburn and nausea  Neurological: Negative for dizziness, focal weakness, headaches, light-headedness and weakness  All other systems reviewed and are negative  No Known Allergies        Current Outpatient Medications:     amLODIPine (NORVASC) 10 mg tablet, TAKE 1 TABLET DAILY, Disp: 90 tablet, Rfl: 3    AMYLASE-LIPASE-PROTEASE PO, Take by mouth , Disp: , Rfl:     aspirin (ECOTRIN LOW STRENGTH) 81 mg EC tablet, Take 81 mg by mouth daily, Disp: , Rfl:     atorvastatin (LIPITOR) 80 mg tablet, TAKE 1 TABLET DAILY AT     BEDTIME, Disp: 90 tablet, Rfl: 3    clopidogrel (PLAVIX) 75 mg tablet, TAKE 1 TABLET DAILY, Disp: 90 tablet, Rfl: 3    Cyanocobalamin (VITAMIN B12 PO), Take by mouth once a week , Disp: , Rfl:     Magnesium Oxide (MAG-200 PO), Take by mouth once a week , Disp: , Rfl:     Multiple Vitamin (MULTIVITAMIN) tablet, Take 1 tablet by mouth daily 1-2 times a week , Disp: , Rfl:     pantoprazole (PROTONIX) 40 mg tablet, Take 1 tablet (40 mg total) by mouth daily, Disp: 90 tablet, Rfl: 1    Ferrous Sulfate Dried (Feosol) 200 (65 Fe) MG TABS, Take 200 mg by mouth every other day (Patient not taking: Reported on 3/31/2022 ), Disp: , Rfl:     Fluad Quadrivalent 0 5 ML PRSY, PHARMACY ADMINISTERED (Patient not taking: Reported on 3/31/2022), Disp: , Rfl:     torsemide (DEMADEX) 20 mg tablet, Take 1 tablet (20 mg total) by mouth 2 (two) times a day, Disp: 180 tablet, Rfl: 3    Social History     Socioeconomic History    Marital status:      Spouse name: Not on file    Number of children: Not on file    Years of education: Not on file    Highest education level: Not on file   Occupational History    Not on file   Tobacco Use    Smoking status: Current Every Day Smoker     Packs/day: 1 00     Years: 50 00     Pack years: 50 00     Types: Cigarettes    Smokeless tobacco: Never Used    Tobacco comment: 4-5 cigarettes daily    Vaping Use    Vaping Use: Never used   Substance and Sexual Activity    Alcohol use: Not Currently     Comment: occasional    Drug use: No    Sexual activity: Not on file   Other Topics Concern    Not on file   Social History Narrative    · Most recent tobacco use screenin2018      · Do you currently or have you served in [employer]: Yes      · If Yes, What branch of service:   [employer]      · Occupation:   Dentists      · Exercise level:   Occasional        · Caffeine intake:   Heavy      · Marital status:         · Diet:   Regular  low fat     · Seat belts used routinely:   Yes      · Smoke alarm in home: Yes      · Advance directive: Yes      · General stress level:   Low      Social Determinants of Health     Financial Resource Strain: Not on file   Food Insecurity: No Food Insecurity    Worried About Running Out of Food in the Last Year: Never true    Noris of Food in the Last Year: Never true   Transportation Needs: No Transportation Needs    Lack of Transportation (Medical): No    Lack of Transportation (Non-Medical):  No   Physical Activity: Not on file   Stress: Not on file   Social Connections: Not on file   Intimate Partner Violence: Not on file   Housing Stability: Unknown    Unable to Pay for Housing in the Last Year: Not on file    Number of Places Lived in the Last Year: 1    Unstable Housing in the Last Year: No       Family History   Problem Relation Age of Onset  Heart attack Father     Other Sister         bypass and vlave replacement    Stroke Paternal Uncle     Arrhythmia Neg Hx     Asthma Neg Hx     Clotting disorder Neg Hx     Fainting Neg Hx     Anuerysm Neg Hx     Hypertension Neg Hx         unsure     Hyperlipidemia Neg Hx     Heart failure Neg Hx        Physical Exam  Vitals and nursing note reviewed  Constitutional:       General: He is not in acute distress  HENT:      Head: Normocephalic and atraumatic  Eyes:      Conjunctiva/sclera: Conjunctivae normal    Cardiovascular:      Rate and Rhythm: Regular rhythm  Bradycardia present  Pulses: Intact distal pulses  Heart sounds: Murmur heard  Harsh midsystolic murmur is present with a grade of 3/6 at the upper right sternal border radiating to the neck  Pulmonary:      Effort: Pulmonary effort is normal       Breath sounds: Normal breath sounds  Abdominal:      General: Bowel sounds are normal       Palpations: Abdomen is soft  Musculoskeletal:         General: Normal range of motion  Cervical back: Normal range of motion and neck supple  Skin:     General: Skin is warm and dry  Neurological:      Mental Status: He is alert and oriented to person, place, and time  Vitals: Blood pressure 110/58, pulse 59, height 5' 10" (1 778 m), weight 75 3 kg (166 lb), SpO2 97 %     Wt Readings from Last 3 Encounters:   03/31/22 75 3 kg (166 lb)   03/25/22 75 8 kg (167 lb 3 2 oz)   03/11/22 77 2 kg (170 lb 1 6 oz)         Labs & Results:  Lab Results   Component Value Date    WBC 5 19 03/28/2022    HGB 8 1 (L) 03/28/2022    HCT 27 4 (L) 03/28/2022    MCV 93 03/28/2022     03/28/2022     No results found for: BNP  No components found for: CHEM    Results for orders placed during the hospital encounter of 05/06/21    Echo complete with contrast if indicated    Narrative  [address] [place], [address] [place] [address]  [phone]    Transthoracic Echocardiogram  2D, M-mode, Doppler, and Color Doppler    Study date:  06-May-2021    Patient: Angelique Graff  MR number: [de-identified]  Account number: [de-identified]  : 1945  Age: 64 years  Gender: Male  Status: Outpatient  Location: [place] [address]   Height: 70 in  Weight: 181 lb  BP: 150/ 58 mmHg    Indications: Assess the aortic valve  Diagnoses: I35 0 - Nonrheumatic aortic (valve) stenosis    Sonographer:  Jacinta Murguia RDCS  Primary Physician:  Doreen Robert MD  Referring Physician:  Christie Baker MD  Group:  [id number]  Interpreting Physician:  Bernice Child MD    SUMMARY    LEFT VENTRICLE:  Systolic function was normal  Ejection fraction was estimated to be 60 %  There were no regional wall motion abnormalities  Wall thickness was mildly increased  Doppler parameters were consistent with abnormal left ventricular relaxation (grade 1 diastolic dysfunction)  LEFT ATRIUM:  The atrium was mildly dilated  MITRAL VALVE:  There was mild regurgitation  AORTIC VALVE:  The valve was probably trileaflet  Leaflets exhibited moderately increased thickness and moderate calcification  There was severe stenosis  There was trace regurgitation  Valve peak gradient was 63 mmHg  Valve mean gradient was 32 mmHg  Aortic valve area was 1 cmï¾² by the continuity equation  Estimated aortic valve area (by VTI) was 0 91 cmï¾²  TRICUSPID VALVE:  There was trace regurgitation  HISTORY: PRIOR HISTORY: CAD, bradycardia, DM, dyslipidemia, PAD, CABG,    PROCEDURE: The study was performed in the [address]  This was a routine study  The transthoracic approach was used  The study included complete 2D imaging, M-mode, complete spectral Doppler, and color Doppler  The heart rate was  66 bpm, at the start of the study  Images were obtained from the parasternal, apical, subcostal, and suprasternal notch acoustic windows  Image quality was adequate      LEFT VENTRICLE: Size was normal  Systolic function was normal  Ejection fraction was estimated to be 60 %  There were no regional wall motion abnormalities  Wall thickness was mildly increased  DOPPLER: Doppler parameters were consistent  with abnormal left ventricular relaxation (grade 1 diastolic dysfunction)  RIGHT VENTRICLE: The size was normal  Systolic function was normal  Wall thickness was normal     LEFT ATRIUM: The atrium was mildly dilated  RIGHT ATRIUM: Size was normal     MITRAL VALVE: Valve structure was normal  There was normal leaflet separation  DOPPLER: The transmitral velocity was within the normal range  There was no evidence for stenosis  There was mild regurgitation  AORTIC VALVE: The valve was probably trileaflet  Leaflets exhibited moderately increased thickness and moderate calcification  DOPPLER: There was severe stenosis  There was trace regurgitation  TRICUSPID VALVE: The valve structure was normal  There was normal leaflet separation  DOPPLER: The transtricuspid velocity was within the normal range  There was no evidence for stenosis  There was trace regurgitation  PULMONIC VALVE: Leaflets exhibited normal thickness, no calcification, and normal cuspal separation  DOPPLER: The transpulmonic velocity was within the normal range  There was no significant regurgitation  PERICARDIUM: There was no pericardial effusion  The pericardium was normal in appearance  AORTA: The root exhibited normal size  SYSTEMIC VEINS: IVC: The inferior vena cava was normal in size      MEASUREMENT TABLES    2D MEASUREMENTS  LVOT   (Reference normals)  Diam   23 mm   (--)    DOPPLER MEASUREMENTS  LVOT   (Reference normals)  VTI   24 cm   (--)  R-R interval   1017 ms   (--)  HR   59 bpm   (--)  Stroke vol   99 71 ml   (--)  Cardiac output   5 88 L/min   (--)  Cardiac index   2 94 L/min/mï¾²   (--)  Aortic valve   (Reference normals)  VTI   108 cm   (--)  Peak gradient   63 mmHg   (--)  Mean gradient   32 mmHg (--)  Valve area, cont   1 cmï¾²   (--)  Obstr index, VTI   0 22    (--)  Valve area, VTI   0 91 cmï¾²   (--)  Area index, VTI   0 46 cmï¾²/mï¾²   (--)    SYSTEM MEASUREMENT TABLES    2D  %FS: 23 52 %  Ao Diam: 3 06 cm  EDV(Teich): 137 67 ml  EF(Teich): 46 6 %  ESV(Teich): 73 51 ml  IVSd: 1 26 cm  LA Area: 24 18 cm2  LA Diam: 4 19 cm  LVEDV MOD A4C: 198 32 ml  LVEF MOD A4C: 54 08 %  LVESV MOD A4C: 91 07 ml  LVIDd: 5 34 cm  LVIDs: 4 08 cm  LVLd A4C: 10 15 cm  LVLs A4C: 8 98 cm  LVOT Diam: 2 43 cm  LVPWd: 1 18 cm  RA Area: 14 06 cm2  RVIDd: 3 75 cm  SV MOD A4C: 107 24 ml  SV(Teich): 64 16 ml    CW  AV Env  Ti: 414 84 ms  AV MaxP 54 mmHg  AV VTI: 103 91 cm  AV Vmax: 3 82 m/s  AV Vmean: 2 5 m/s  AV meanP 08 mmHg    MM  TAPSE: 2 13 cm    PW  LUIS (VTI): 1 06 cm2  LUIS Vmax: 1 04 cm2  E' Sept: 0 04 m/s  E/E' Sept: 22 83  LVOT Env  Ti: 482 08 ms  LVOT VTI: 23 7 cm  LVOT Vmax: 0 86 m/s  LVOT Vmean: 0 5 m/s  LVOT maxP 94 mmHg  LVOT meanP 25 mmHg  LVSV Dopp: 110 25 ml  MV A Pop: 1 23 m/s  MV Dec [place]: 5 05 m/s2  MV DecT: 180 67 ms  MV E Pop: 0 91 m/s  MV E/A Ratio: 0 74  MV PHT: 52 39 ms  MVA By PHT: 4 2 cm2    [employer] Accredited Echocardiography Laboratory    Prepared and electronically signed by    Apollo Peres MD  Signed 06-May-2021 16:40:31    No results found for this or any previous visit  This note was completed in part utilizing [employer] direct voice recognition software  Grammatical errors, random word insertion, spelling mistakes, and incomplete sentences may be an occasional consequence of the system secondary to software limitations, ambient noise and hardware issues  At the time of dictation, efforts were made to edit, clarify and /or correct errors  Please read the chart carefully and recognize, using context, where substitutions have occurred    If you have any questions or concerns about the context, text or information contained within the body of this dictation, please contact myself, the provider, for further clarification

## 2022-03-31 NOTE — TELEPHONE ENCOUNTER
----- Message from Noni Bernstein MD sent at 3/31/2022 12:18 PM EDT -----  It appears patient was recently at hospital and had acute kidney injury, was seen by Cardiology today and dose of torsemide was decreased  Please order for repeat BMP to be done in 2 weeks for acute kidney injury  Please arrange for follow-up with me or an advanced practitioner

## 2022-03-31 NOTE — PATIENT INSTRUCTIONS
DASH Eating Plan   WHAT YOU NEED TO KNOW:   The DASH (Dietary Approaches to Stop Hypertension) Eating Plan is designed to help prevent or lower high blood pressure  It can also help to lower LDL (bad) cholesterol and decrease your risk for heart disease  The plan is low in sodium, sugar, unhealthy fats, and total fat  It is high in potassium, calcium, magnesium, and fiber  These nutrients are added when you eat more fruits, vegetables, and whole grains  With the DASH eating plan, you need to eat a certain number of servings from each food group  This will help you get enough of certain nutrients and limit others  The amount of servings you should eat depends on how many calories you need  Your dietitian can      DISCHARGE INSTRUCTIONS:   What you need to know about sodium:  Your dietitian will tell you how much sodium is safe for you to have each day  People with high blood pressure should have no more than 1,500 to 2,300 mg of sodium in a day  A teaspoon (tsp) of salt has 2,300 mg of sodium  This may seem like a difficult goal, but small changes to the foods you eat can make a big difference  Your healthcare provider or dietitian can help you create a meal plan that follows your sodium limit  · Read food labels  Food labels can help you choose foods that are low in sodium  The amount of sodium is listed in milligrams (mg)  The % Daily Value (DV) column tells you how much of your daily needs are met by 1 serving of the food for each nutrient listed  Choose foods that have less than 5% of the DV of sodium  These foods are considered low in sodium  Foods that have 20% or more of the DV of sodium are considered high in sodium  Avoid foods that have more than 300 mg of sodium in each serving  Choose foods that say low-sodium, reduced-sodium, or no salt added on the food label  · Limit added salt  Do not salt food at the table if you add salt when you cook   Use herbs and spices, such as onions, garlic, and salt-free seasonings to add flavor  Try lemon or lime juice or vinegar to add a tart flavor  Use hot peppers or a small amount of hot pepper sauce to add a spicy flavor  Limit foods high in added salt, such as the following:    ? Seasonings made with salt, such as garlic salt, celery salt, onion salt, seasoned salt, meat tenderizers, and monosodium glutamate (MSG)    ? Miso soup and canned or dried soup mixes    ? Regular soy sauce, barbecue sauce, teriyaki sauce, steak sauce, Worcestershire sauce, and most flavored vinegars    ? Snack foods, such as salted chips, popcorn, pretzels, pork rinds, salted crackers, and salted nuts    ? Frozen foods, such as dinners, entrees, vegetables with sauces, and breaded meats    · Ask about salt substitutes  Ask your healthcare provider if you may use salt substitutes  Some salt substitutes have ingredients that can be harmful if you have certain health conditions  · Choose foods carefully at restaurants  Meals from restaurants, especially fast food restaurants, are often high in sodium  Some restaurants have nutrition information that tells you the amount of sodium in their foods  Ask to have your food prepared with less, or no salt  What you need to know about fats:  Healthy fats include unsaturated fats and omega-3 fatty acids  Unhealthy fats include saturated fats and trans fats  · Include healthy fats, such as the following:      ? Cooking oils, such as soybean, canola, olive, or sunflower    ? Fatty fish, such as salmon, tuna, mackerel, or sardines    ? Flaxseed oil or ground flaxseed    ? ½ cup of cooked beans, such as black beans, kidney beans, or mondragon beans    ? 1½ ounces of low-sodium nuts, such as almonds or walnuts    ? Low-sugar, low-sodium peanut butter    ? Seeds such as nasrin seeds or sunflower seeds       · Limit or do not have unhealthy fats, such as the following:      ?  Foods that contain fat from animals, such as fatty meats, whole milk, butter, and cream    ? Shortening, stick margarine, palm oil, and coconut oil    ? Full-fat or creamy salad dressing    ? Creamy soup    ? Crackers, chips, and baked goods made with margarine or shortening    ? Foods that are fried in unhealthy fats    ? Gravy and sauces, such as Fernando or cheese sauces    What you need to know about carbohydrates (carbs): All carbs break down into sugar  Complex carbs contain more fiber than simple carbs  This means complex carbs go into the bloodstream more slowly and cause less of a blood sugar spike  Try to include more complex carbs and fewer simple carbs  · Include complex carbs, such as the following:      ? 1 slice of whole-grain bread    ? 1 ounce of dry cereal that does not contain added sugar    ? ½ cup of cooked oatmeal    ? 2 ounces of cooked whole-grain pasta    ? ½ cup of cooked brown rice    · Limit or do not have simple carbs, such as the following:      ? Baked goods, such as doughnuts, pastries, and cookies    ? Mixes for cornbread and biscuits    ? White rice and pasta mixes, such as boxed macaroni and cheese    ? Instant and cold cereals that contain sugar    ? Jelly, jam, and ice cream that contain sugar    ? Condiments such as ketchup    ? Drinks high in sugar, such as soft drinks, lemonade, and fruit juice    What you need to know about vegetables and fruits:  Vegetables and fruits can be fresh, frozen, or canned  If possible, try to choose low-sodium canned options  · Include a variety of vegetables and fruits, such as the following:      ? 1 medium apple, pear, or peach (about ½ cup chopped)    ? ½ small banana    ? ½ cup berries, such as blueberries, strawberries, or blackberries    ? 1 cup of raw leafy greens, such as lettuce, spinach, kale, or jeremi greens    ? ½ cup of frozen or canned (no added salt) vegetables, such as green beans    ? ½ cup of fresh, frozen, or canned fruit (canned in light syrup or fruit juice)    ?  ½ cup of vegetable or fruit juice    · Limit or do not have vegetables and fruits made in the following ways:      ? Frozen fruit such as cherries that have added sugar    ? Fruit in cream or butter sauce    ? Canned vegetables that are high in sodium    ? Sauerkraut, pickled vegetables, and other foods prepared in brine    ? Fried vegetables or vegetables in butter or high-fat sauces    What you need to know about protein foods:   · Include lean or low-fat protein foods, such as the following:      ? Poultry (chicken, turkey) with no skin    ? Fish (especially fatty fish, such as salmon, fresh tuna, or mackerel)    ? Lean beef and pork (loin, round, extra lean hamburger)    ? Egg whites and egg substitutes    ? 1 cup of nonfat (skim) or 1% milk    ? 1½ ounces of fat-free or low-fat cheese    ? 6 ounces of nonfat or low-fat yogurt    · Limit or do not have high-fat protein foods, such as the following:      ? Smoked or cured meat, such as corned beef, marie, ham, hot dogs, and sausage    ? Canned beans and canned meats or spreads, such as potted meats, sardines, anchovies, and imitation seafood    ? Deli or lunch meats, such as bologna, ham, turkey, and roast beef    ? High-fat meat (T-bone steak, regular hamburger, and ribs)    ? Whole eggs and egg yolks    ? Whole milk, 2% milk, and cream    ? Regular cheese and processed cheese    Other guidelines to follow:   · Maintain a healthy weight  Your risk for heart disease is higher if you are overweight  Your healthcare provider may suggest that you lose weight if you are overweight  You can lose weight by eating fewer calories and foods that have added sugars and fat  The DASH meal plan can help you do this  Decrease calories by eating smaller portions at each meal and fewer snacks  Ask your healthcare provider for more information about how to lose weight  · Exercise regularly  Regular exercise can help you reach or maintain a healthy weight   Regular exercise can also help decrease your blood pressure and improve your cholesterol levels  Get 30 minutes or more of moderate exercise each day of the week  To lose weight, get at least 60 minutes of exercise  Talk to your healthcare provider about the best exercise program for you  · Limit alcohol  Women should limit alcohol to 1 drink a day  Men should limit alcohol to 2 drinks a day  A drink of alcohol is 12 ounces of beer, 5 ounces of wine, or 1½ ounces of liquor  For more information:   · [facility] and [address]  [address] [address]  [place] [address]  Phone: 5- [phone]  Web Address: [facility] no    © [facility] 2022 Information is for End User's use only and may not be sold, redistributed or otherwise used for commercial purposes  All illustrations and images included in CareNotes® are the copyrighted property of [employer]  or [address] [place], [place] [address]je[address]   The above information is an  only  It is not intended as medical advice for individual conditions or treatments  Talk to your doctor, nurse or pharmacist before following any medical regimen to see if it is safe and effective for you

## 2022-04-01 ENCOUNTER — OFFICE VISIT (OUTPATIENT)
Dept: CARDIAC SURGERY | Facility: CLINIC | Age: 77
End: 2022-04-01
Payer: MEDICARE

## 2022-04-01 VITALS
HEIGHT: 70 IN | HEART RATE: 60 BPM | SYSTOLIC BLOOD PRESSURE: 111 MMHG | BODY MASS INDEX: 23.65 KG/M2 | DIASTOLIC BLOOD PRESSURE: 53 MMHG | RESPIRATION RATE: 18 BRPM | WEIGHT: 165.2 LBS | OXYGEN SATURATION: 99 %

## 2022-04-01 DIAGNOSIS — N18.4 STAGE 4 CHRONIC KIDNEY DISEASE (HCC): ICD-10-CM

## 2022-04-01 DIAGNOSIS — Z01.818 PRE-OP TESTING: ICD-10-CM

## 2022-04-01 DIAGNOSIS — I70.1 RENAL ARTERY STENOSIS, NATIVE, BILATERAL (HCC): Chronic | ICD-10-CM

## 2022-04-01 DIAGNOSIS — I35.0 NONRHEUMATIC AORTIC VALVE STENOSIS: Primary | ICD-10-CM

## 2022-04-01 DIAGNOSIS — F17.200 SMOKER: ICD-10-CM

## 2022-04-01 DIAGNOSIS — Z13.6 ENCOUNTER FOR SPECIAL SCREENING EXAMINATION FOR CARDIOVASCULAR DISORDER: ICD-10-CM

## 2022-04-01 PROCEDURE — 99215 OFFICE O/P EST HI 40 MIN: CPT | Performed by: NURSE PRACTITIONER

## 2022-04-01 NOTE — PROGRESS NOTES
Consultation - Cardiothoracic Surgery   Kingston Hemphill  76 y o  male MRN: [id number]    Physician Requesting Consult: Katina De La Cruz    Reason for Consult / Principal Problem: Aortic stenosis, Non-Rheumatic    History of Present Illness: Kingston Hemphill  is a 76y o  year old male who returns to the office to re-discuss candidacy for TAVR for treatment of his aortic stenosis  Patient was initially seen in September 2020 as a referral by his Cardiologist  Patient made the decision to hold off on pre op testing and pursue treatment for other issues  Patient's PMHx is notable for CAD s/p CABG x 2 (9/2013), HTN, HLD, B/L Carotid stenosis (70%+), PAD/aorto-iliac disease w/claudication s/p multiple LE revascularization procedures, CKD4 (GFR 20), DM2, anemia, and colitis (h/o colectomy)  Patient was admitted to the hospital earlier this month with worsening LE edema, progressive SOB and orthopnea  HS troponin 727; BNP 34,910 c/w aute CHF  Echo demonstrated EF 50%, severe AS, LUIS 0 82 cm2, MG 34 & PG 54 mm Hg  Upon interview patient reports weight loss since hospitalization with resolution of his edema  He states his breathing is improved as well  He denies chest pain, palpitations or lightheadedness  He reports leg fatigue with minimal ambulation  He admits to continued tobacco abuse, but has reduced from 1 5 PPD to a few cigarettes per day  He denies appetite issues or GI disturbance       Patient lives alone in a second floor apt and is independent with ADL's    Covid vaccinations 3/3/21 & 3/24/21      Past Medical History:  Past Medical History:   Diagnosis Date    CAD (coronary artery disease)     Carotid stenosis, asymptomatic, bilateral     Chronic kidney disease     Diabetes ([facility] [address] [address] )     type 2, non-insulin dependent    GERD (gastroesophageal reflux disease)     History of nephrolithiasis     Hyperlipidemia     Hypertension     [facility]     Severe aortic stenosis Past Surgical History:   Past Surgical History:   Procedure Laterality Date    APPENDECTOMY      COLECTOMY      COLONOSCOPY  2013    CORONARY ARTERY BYPASS GRAFT  2013    X 2    FEMORAL ARTERY - POPLITEAL ARTERY BYPASS GRAFT      HEMORRHOID SURGERY      IR AORTAGRAM WITH RUN-OFF  11/19/2018    AL SLCTV CATHJ 3RD+ ORD SLCTV ABDL PEL/LXTR [facility] Left 8/12/2016    Procedure: LEFT FEMORAL ARTERIOGRAM; BALLOON ANGIOPLASTY; SFA  AND FEMORAL AT VEIN GRAFT;  Surgeon: Ezekiel Oh MD;  Location: [facility];  Service: Vascular    TONSILLECTOMY AND ADENOIDECTOMY           Family History:  Family History   Problem Relation Age of Onset    Heart attack Father     Other Sister         bypass and vlave replacement    Stroke Paternal Uncle     Arrhythmia Neg Hx     Asthma Neg Hx     Clotting disorder Neg Hx     Fainting Neg Hx     Anuerysm Neg Hx     Hypertension Neg Hx         unsure     Hyperlipidemia Neg Hx     Heart failure Neg Hx          Social History:    Social History     Substance and Sexual Activity   Alcohol Use Not Currently    Comment: occasional     Social History     Substance and Sexual Activity   Drug Use No     Social History     Tobacco Use   Smoking Status Current Every Day Smoker    Packs/day: 0 25    Years: 50 00    Pack years: 12 50    Types: Cigarettes   Smokeless Tobacco Never Used   Tobacco Comment    4-5 cigarettes daily          Home Medications:   Prior to Admission medications    Medication Sig Start Date End Date Taking?  Authorizing Provider   amLODIPine (NORVASC) 10 mg tablet TAKE 1 TABLET DAILY 12/13/21  Yes Douglas Morin MD   AMYLASE-LIPASE-PROTEASE PO Take by mouth    Yes Historical Provider, MD   aspirin (ECOTRIN LOW STRENGTH) 81 mg EC tablet Take 81 mg by mouth daily   Yes Historical Provider, MD   atorvastatin (LIPITOR) 80 mg tablet TAKE 1 TABLET DAILY AT     BEDTIME 2/23/22  Yes Douglas Morin MD   clopidogrel (PLAVIX) 75 mg tablet TAKE 1 TABLET DAILY 11/16/21  Yes Idris Mcneill MD   Cyanocobalamin (VITAMIN B12 PO) Take by mouth once a week    Yes Historical Provider, MD   Magnesium Oxide (MAG-200 PO) Take by mouth once a week    Yes Historical Provider, MD   Multiple Vitamin (MULTIVITAMIN) tablet Take 1 tablet by mouth daily 1-2 times a week    Yes Historical Provider, MD   pantoprazole (PROTONIX) 40 mg tablet Take 1 tablet (40 mg total) by mouth daily 12/27/21  Yes Ihsan Guzman PA-C   torsemide (DEMADEX) 20 mg tablet Take 1 tablet (20 mg total) by mouth 2 (two) times a day 3/31/22  Yes ZHANNA Miles   Ferrous Sulfate Dried (Feosol) 200 (65 Fe) MG TABS Take 200 mg by mouth every other day  Patient not taking: Reported on 3/31/2022   4/1/22  Historical Provider, MD   Fluad Quadrivalent 0 5 ML R [address]  Patient not taking: Reported on 3/31/2022 10/6/20 4/1/22  Historical Provider, MD       Allergies:  No Known Allergies    Review of Systems:  Review of Systems - History obtained from chart review and the patient  General ROS: negative  Psychological ROS: negative  Ophthalmic ROS: positive for - uses glasses  ENT ROS: negative  Allergy and Immunology ROS: negative  Hematological and Lymphatic ROS: negative for - bleeding problems, blood clots, bruising or jaundice  Endocrine ROS: negative  Breast ROS: negative  Respiratory ROS: positive for - orthopnea and shortness of breath  negative for - cough, hemoptysis or pleuritic pain  Cardiovascular ROS: positive for - dyspnea on exertion, edema, orthopnea and shortness of breath  negative for - chest pain, irregular heartbeat, loss of consciousness, palpitations, paroxysmal nocturnal dyspnea or rapid heart rate  Gastrointestinal ROS: no abdominal pain, change in bowel habits, or black or bloody stools  Genito-Urinary ROS: no dysuria, trouble voiding, or hematuria  Musculoskeletal ROS: positive for - muscle pain  Neurological ROS: no TIA or stroke symptoms  Dermatological ROS: negative    Vital Signs:     Vitals:    04/01/22 1352 04/01/22 1353   BP: 118/57 111/53   BP Location: Left arm Right arm   Patient Position: Sitting Sitting   Cuff Size: Standard Standard   Pulse: 60    Resp: 18    SpO2: 99%    Weight: 74 9 kg (165 lb 3 2 oz)    Height: 5' 10" (1 778 m)        Physical Exam:    General: Alert,orienetd, underweight, muscle wasting, no acute distress  HEENT/NECK:  PERRLA  No jugular venous distention  Cardiac:Regular rate and rhythm, III/VIharsh systolic murmur RUSB   Carotid arteries: 1+ pulses with b/l bruits  Pulmonary:  Breath sounds clear bilaterally  Abdomen:  Non-tender, Non-distended  Positive bowel sounds  Upper extremities: 2+ radial pulses; brisk capillary refill  Lower extremities: Extremities warm/dry  PT/DP pulses 0 bilaterally  Trace edema B/L  Neuro: Alert and oriented X 3  Sensation is grossly intact  No focal deficits  Musculoskeletal: MAEE, stable gait  Skin: Warm/Dry, without rashes or lesions  Lab Results:     Results from last 7 days   Lab Units 03/28/22  1141   WBC Thousand/uL 5 19   HEMOGLOBIN g/dL 8 1*   HEMATOCRIT % 27 4*   PLATELETS Thousands/uL 285     Results from last 7 days   Lab Units 03/28/22  1141   POTASSIUM mmol/L 4 3   CHLORIDE mmol/L 106   CO2 mmol/L 29   BUN mg/dL 58*   CREATININE mg/dL 2 85*   CALCIUM mg/dL 9 6         Lab Results   Component Value Date    HGBA1C 5 6 03/23/2022       Imaging Studies:     Echocardiogram: 3/24/22      Left Ventricle: Left ventricular cavity size is normal  The left ventricular ejection fraction is 50%  Systolic function is low normal  Diastolic function is moderately abnormal, consistent with grade II (pseudonormal) relaxation  There is mild concentric hypertrophy  There is moderate hypokinesis of the basal to mid inferior wall    Right Ventricle: Right ventricular cavity size is normal  Systolic function is normal     Left Atrium: The atrium is mildly dilated    Aortic Valve:  There is mild regurgitation  There is severe stenosis    Mitral Valve: There is moderate regurgitation      ECG: 3/22/22  Sinus rhythm with Premature supraventricular complexes  Rightward axis  Cannot rule out Inferior infarct , age undetermined  ST & T wave abnormality, consider anterolateral ischemia v repolarization changes    Carotid duplex: 1/27/22  RIGHT:  There is >70% stenosis noted in the internal carotid artery  Plaque is  heterogenous and irregular  There is a severe stenosis in the external carotid artery  Vertebral artery flow is antegrade  There is no significant subclavian artery  disease  LEFT:  There is >70% stenosis noted in the internal carotid artery  Plaque is  heterogenous and irregular  There is a severe stenosis in the external carotid artery  Vertebral artery flow is antegrade  Monophasic flow in the subclavian artery is  suggestive of more proximal disease  Lower limb arterial duplex: 1/27/22  RIGHT LOWER LIMB:  This resting evaluation shows moderate to severe diffuse lower extremity  arterial atherosclerotic disease  There is a 50-75% stenosis in the proximal profunda, mid superficial femoral  and proximal popliteal arteries  There is a >75% stenosis in the distal superficial femoral artery, which  appears to be near occlusion  The posterior tibial artery is occluded  Ankle/Brachial index: 0 69, moderate range  Prior 0 32  PVR/ PPG tracings are dampened  Metatarsal pressure of 74 mmHg  Great toe pressure of 75 mmHg, within the healing range     LEFT LOWER LIMB:  The common femoral artery is ectatic in caliber, measuring 1 8 cm previously  1 8 cm  There is a <50% stenosis in the common femoral artery  There is a >75% stenosis in the proximal profunda artery and a <50% stenosis in  the mid superficial femoral artery    The superficial femoral- anterior tibial artery bypass graft is patent, with a  >75% stenosis in the distal portion of the graft and a 50-75% stenosis at the  distal anastomosis  Ankle/Brachial index: 0 78, severe claudication range  Prior 0 64  PVR/ PPG tracings are slightly dampened  Metatarsal pressure 206 of mmHg  Great toe pressure of 85 mmHg, within the healing range     CT Abd/pelvis 12/7/21  IMPRESSION:     Focal wall thickening involving a short segment of the transverse colon that appears somewhat masslike  This most likely representing focal colitis given the patient's symptoms, however, colonoscopy is recommended after symptoms resolve to ensure this   does not represent a mass      Emphysema      Chronic pancreatitis with atrophy of the gland      Bilateral nonobstructing intrarenal calculi the largest in the left lower lobe measures 9 mm      Mild diffuse bladder wall thickening  This could simply represent underdistention, however, cystitis is not excluded    Correlate clinically         I have personally reviewed pertinent films in PACS     PCP, Cardiology, Nephrology, Hem/Onc, Vascular and GI notes reviewed      TAVR evaluation Assessment:     Jose Dumont: III    5 Meter Walk: 6 sec, 7 sec, 7 sec    STS risk score (preliminary): 4 5%    KCCQ-12 completed    Assessment:  Patient Active Problem List    Diagnosis Date Noted    Chronic heart failure with preserved ejection fraction ([address] ) 03/31/2022    Acute kidney injury superimposed on chronic kidney disease ([address] ) 03/22/2022    Iron deficiency anemia 03/22/2022    Smoker 03/22/2022    Persistent proteinuria, unspecified 01/10/2022    Stage 4 chronic kidney disease ([address] ) 01/10/2022    Leukopenia 01/10/2022    Hypomagnesemia 01/10/2022    Renal cyst, left 01/10/2022    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 ([address] ) 09/10/2021    GERD (gastroesophageal reflux disease)     Hyperlipidemia     Benign hypertension with chronic kidney disease, stage III ([address] ) 06/01/2021    Type 2 diabetes mellitus, without long-term current use of insulin ([address] ) 06/01/2021    Sinus bradycardia 10/08/2020    Nonrheumatic aortic valve stenosis 11/27/2019    Calcific tendinitis of right shoulder region 06/17/2019    Right shoulder pain 06/17/2019    Cigarette nicotine dependence with nicotine-induced disorder 10/22/2018    [facility] 08/12/2016    Asymptomatic bilateral carotid artery stenosis 08/12/2016    S/P CABG (coronary artery bypass graft) 08/12/2016    Hypertension 08/12/2016    Aort[facility]iliac disease [facility] ) 08/12/2016    Renal artery stenosis, native, bilateral ([facility] ) 08/12/2016    CAD (coronary artery disease) 08/12/2016    Progressive angina ([facility] ) 08/12/2016    Atherosclerosis of native arteries of extremities with intermittent claudication, bilateral legs (HCC)     [facility]peripheral vascular disease) ([facility])     Tension headache 10/24/2013     Severe aortic stenosis; Ongoing TAVR workup    Plan:    True Joshua  has symptomatic severe aortic stenosis  They will undergo the following testing for transcatheter aortic valve replacement: Gated CTA of the chest/abdomen/pelvis, cardiac catheterization, dental clearance, pulmonary function tests with ABG, and carotid artery ultrasound  Patient will be admitted for an overnight hospital stay for IV hydration to facilitate his CTA and cath due to [address] [facility] [address]  Once these studies have been completed, Micky Lewis  will follow up in our office to review the results and to be evaluated to confirm the suitability of proceeding with transcatheter aortic valve replacment  Micky Lewis  was comfortable with our recommendations, and their questions were answered to their satisfaction  The patient recently had a screening colonoscopy in 12/21/21  Therefore GI referral is not indicated at this time       ZHANNA Sánchez  DATE: April 1, 2022  TIME: 2:17 PM

## 2022-04-04 ENCOUNTER — OFFICE VISIT (OUTPATIENT)
Dept: FAMILY MEDICINE CLINIC | Facility: CLINIC | Age: 77
End: 2022-04-04
Payer: MEDICARE

## 2022-04-04 VITALS
SYSTOLIC BLOOD PRESSURE: 120 MMHG | TEMPERATURE: 97.3 F | OXYGEN SATURATION: 98 % | HEIGHT: 70 IN | HEART RATE: 60 BPM | DIASTOLIC BLOOD PRESSURE: 50 MMHG | BODY MASS INDEX: 23.62 KG/M2 | WEIGHT: 165 LBS | RESPIRATION RATE: 18 BRPM

## 2022-04-04 DIAGNOSIS — D50.8 OTHER IRON DEFICIENCY ANEMIA: ICD-10-CM

## 2022-04-04 DIAGNOSIS — Z76.89 ENCOUNTER FOR SUPPORT AND COORDINATION OF TRANSITION OF CARE: Primary | ICD-10-CM

## 2022-04-04 PROCEDURE — 99495 TRANSJ CARE MGMT MOD F2F 14D: CPT | Performed by: NURSE PRACTITIONER

## 2022-04-04 NOTE — PROGRESS NOTES
Assessment/Plan:  Transition of care  Physical assessment was unremarkable  Most recent labs were reviewed patient is aware of those findings  Blood pressure well controlled today patient has no lower extremity edema heart sounds are within normal considering his current diagnosis in the upcoming procedure  Patient was given an iron infusion the anemia seems to be improving did advised to complete a CBC differential when he does his other follow-up labs in 1 week patient verbalized understanding of this patient did have a question about the metformin did advise that his A1c is 6 this metformin holding it could be temporary but diet management will be sufficient until after surgical procedure  All questions were answered patient verbalizes very happy with the outcome of today's visit he has a better degree of clarity considering the upcoming procedure and any home health care that might be necessary after the procedure  Patient denies any need for home health services at this time  Patient verbalized agreement and understanding of the plan of care as outlined during the office visit today return to office as indicated or sooner if a problem arises  Problem List Items Addressed This Visit        Other    Iron deficiency anemia    Relevant Orders    CBC and differential      Other Visit Diagnoses     Encounter for support and coordination of transition of care    -  Primary            Subjective:      Patient ID: Luisana Rodriguez  is a 76 y o  male  Patient is here for a transition of care management  Patient was recently hospitalized for congestive heart failure and unstable angina  Patient is also scheduled for upcoming TAVR placement to be done 04/14/2022  Patient continues to have decreased renal function anemia bilateral pleural effusions and activity intolerance    Patient denies any changes in bowel or bladder habits and does have routine labs ordered to be completed prior to procedure  The following portions of the patient's history were reviewed and updated as appropriate: allergies, current medications, past family history, past medical history, past social history, past surgical history and problem list     Review of Systems   Constitutional: Positive for fatigue  Negative for appetite change and fever  HENT: Negative for congestion and trouble swallowing  Respiratory: Positive for shortness of breath  Cardiovascular: Positive for leg swelling  Negative for chest pain  Gastrointestinal: Negative for abdominal pain  Genitourinary: Negative for difficulty urinating  Musculoskeletal: Negative for myalgias  Neurological: Positive for weakness  Negative for dizziness  Psychiatric/Behavioral: The patient is not nervous/anxious  Objective:    TCM Call (since 3/4/2022)     Date and time call was made  3/28/2022  2:31 PM    Hospital care reviewed  Records reviewed        Patient was hospitialized at  [address] [place] [address]        Date of Admission  03/22/22    Date of discharge  03/25/22    Diagnosis  CHF    Disposition  Home    Were the patients medications reviewed and updated  Yes    Current Symptoms  Weakness    Weakness severity  Mild      TCM Call (since 3/4/2022)     Post hospital issues  None    Scheduled for follow up?   No    Did you obtain your prescribed medications  Yes    Do you need help managing your prescriptions or medications  No    Is transportation to your appointment needed  No    I have advised the patient to call PCP with any new or worsening symptoms  Naye Espinoza  Family    The type of support provided  Emotional; Physical    Do you have social support  Yes, as much as I need    Are you recieving any outpatient services  No    Are you recieving home care services  No    Are you using any community resources  No    Current waiver services  No    Have you fallen in the last 12 months  No Interperter language line needed  No          /50 (BP Location: Left arm, Patient Position: Sitting, Cuff Size: Standard)   Pulse 60   Temp (!) 97 3 °F (36 3 °C) (Temporal)   Resp 18   Ht 5' 10" (1 778 m)   Wt 74 8 kg (165 lb)   SpO2 98%   BMI 23 68 kg/m²          Physical Exam  Vitals and nursing note reviewed  Constitutional:       General: He is not in acute distress  Appearance: He is well-developed  HENT:      Head: Normocephalic  Right Ear: External ear normal       Left Ear: External ear normal       Mouth/Throat:      Pharynx: Oropharynx is clear  Eyes:      Pupils: Pupils are equal, round, and reactive to light  Cardiovascular:      Rate and Rhythm: Bradycardia present  Rhythm irregular  Heart sounds: Murmur heard  Pulmonary:      Effort: Pulmonary effort is normal       Breath sounds: Rales present  Abdominal:      General: Bowel sounds are normal       Palpations: Abdomen is soft  Skin:     General: Skin is warm and dry  Coloration: Skin is pale  Neurological:      General: No focal deficit present  Mental Status: He is alert and oriented to person, place, and time  Psychiatric:         Behavior: Behavior normal          Thought Content:  Thought content normal          Judgment: Judgment normal

## 2022-04-05 ENCOUNTER — HOSPITAL ENCOUNTER (OUTPATIENT)
Dept: NON INVASIVE DIAGNOSTICS | Facility: HOSPITAL | Age: 77
Discharge: HOME | End: 2022-04-05
Payer: MEDICARE

## 2022-04-05 ENCOUNTER — PATIENT OUTREACH (OUTPATIENT)
Dept: FAMILY MEDICINE CLINIC | Facility: CLINIC | Age: 77
End: 2022-04-05

## 2022-04-05 DIAGNOSIS — R00.1 BRADYCARDIA: ICD-10-CM

## 2022-04-05 PROCEDURE — 93226 XTRNL ECG REC<48 HR SCAN A/R: CPT

## 2022-04-05 PROCEDURE — 93225 XTRNL ECG REC<48 HRS REC: CPT

## 2022-04-06 ENCOUNTER — TELEPHONE (OUTPATIENT)
Dept: CARDIOLOGY CLINIC | Facility: CLINIC | Age: 77
End: 2022-04-06

## 2022-04-06 NOTE — TELEPHONE ENCOUNTER
P/c left message on nurse line that has questions about torsemide      Returned pt called, left message to call office

## 2022-04-08 ENCOUNTER — APPOINTMENT (OUTPATIENT)
Dept: LAB | Facility: MEDICAL CENTER | Age: 77
End: 2022-04-08
Payer: MEDICARE

## 2022-04-08 DIAGNOSIS — N18.32 STAGE 3B CHRONIC KIDNEY DISEASE (HCC): ICD-10-CM

## 2022-04-08 DIAGNOSIS — D50.8 OTHER IRON DEFICIENCY ANEMIA: ICD-10-CM

## 2022-04-08 DIAGNOSIS — I50.32 CHRONIC HEART FAILURE WITH PRESERVED EJECTION FRACTION (HCC): ICD-10-CM

## 2022-04-08 LAB
ANION GAP SERPL CALCULATED.3IONS-SCNC: 6 MMOL/L (ref 4–13)
BASOPHILS # BLD AUTO: 0.02 THOUSANDS/ΜL (ref 0–0.1)
BASOPHILS NFR BLD AUTO: 1 % (ref 0–1)
BUN SERPL-MCNC: 42 MG/DL (ref 5–25)
CALCIUM SERPL-MCNC: 9.2 MG/DL (ref 8.3–10.1)
CHLORIDE SERPL-SCNC: 106 MMOL/L (ref 100–108)
CO2 SERPL-SCNC: 28 MMOL/L (ref 21–32)
CREAT SERPL-MCNC: 2.56 MG/DL (ref 0.6–1.3)
EOSINOPHIL # BLD AUTO: 0.17 THOUSAND/ΜL (ref 0–0.61)
EOSINOPHIL NFR BLD AUTO: 4 % (ref 0–6)
ERYTHROCYTE [DISTWIDTH] IN BLOOD BY AUTOMATED COUNT: 18 % (ref 11.6–15.1)
GFR SERPL CREATININE-BSD FRML MDRD: 22 ML/MIN/1.73SQ M
GLUCOSE P FAST SERPL-MCNC: 131 MG/DL (ref 65–99)
HCT VFR BLD AUTO: 32.4 % (ref 36.5–49.3)
HGB BLD-MCNC: 9.6 G/DL (ref 12–17)
IMM GRANULOCYTES # BLD AUTO: 0.03 THOUSAND/UL (ref 0–0.2)
IMM GRANULOCYTES NFR BLD AUTO: 1 % (ref 0–2)
LYMPHOCYTES # BLD AUTO: 1.08 THOUSANDS/ΜL (ref 0.6–4.47)
LYMPHOCYTES NFR BLD AUTO: 25 % (ref 14–44)
MAGNESIUM SERPL-MCNC: 2.5 MG/DL (ref 1.6–2.6)
MCH RBC QN AUTO: 28.1 PG (ref 26.8–34.3)
MCHC RBC AUTO-ENTMCNC: 29.6 G/DL (ref 31.4–37.4)
MCV RBC AUTO: 95 FL (ref 82–98)
MONOCYTES # BLD AUTO: 0.36 THOUSAND/ΜL (ref 0.17–1.22)
MONOCYTES NFR BLD AUTO: 8 % (ref 4–12)
NEUTROPHILS # BLD AUTO: 2.74 THOUSANDS/ΜL (ref 1.85–7.62)
NEUTS SEG NFR BLD AUTO: 61 % (ref 43–75)
NRBC BLD AUTO-RTO: 0 /100 WBCS
PLATELET # BLD AUTO: 201 THOUSANDS/UL (ref 149–390)
PMV BLD AUTO: 11.2 FL (ref 8.9–12.7)
POTASSIUM SERPL-SCNC: 4.2 MMOL/L (ref 3.5–5.3)
RBC # BLD AUTO: 3.42 MILLION/UL (ref 3.88–5.62)
SODIUM SERPL-SCNC: 140 MMOL/L (ref 136–145)
WBC # BLD AUTO: 4.4 THOUSAND/UL (ref 4.31–10.16)

## 2022-04-08 PROCEDURE — 85025 COMPLETE CBC W/AUTO DIFF WBC: CPT

## 2022-04-08 PROCEDURE — 80048 BASIC METABOLIC PNL TOTAL CA: CPT

## 2022-04-08 PROCEDURE — 36415 COLL VENOUS BLD VENIPUNCTURE: CPT

## 2022-04-08 PROCEDURE — 83735 ASSAY OF MAGNESIUM: CPT

## 2022-04-10 PROCEDURE — 93227 XTRNL ECG REC<48 HR R&I: CPT | Performed by: INTERNAL MEDICINE

## 2022-04-12 ENCOUNTER — OFFICE VISIT (OUTPATIENT)
Dept: NEPHROLOGY | Facility: CLINIC | Age: 77
End: 2022-04-12
Payer: MEDICARE

## 2022-04-12 VITALS
DIASTOLIC BLOOD PRESSURE: 58 MMHG | HEIGHT: 70 IN | WEIGHT: 171 LBS | SYSTOLIC BLOOD PRESSURE: 130 MMHG | HEART RATE: 72 BPM | BODY MASS INDEX: 24.48 KG/M2

## 2022-04-12 DIAGNOSIS — N18.32 STAGE 3B CHRONIC KIDNEY DISEASE (HCC): ICD-10-CM

## 2022-04-12 DIAGNOSIS — N18.4 ANEMIA DUE TO STAGE 4 CHRONIC KIDNEY DISEASE (HCC): ICD-10-CM

## 2022-04-12 DIAGNOSIS — N18.30 BENIGN HYPERTENSION WITH CHRONIC KIDNEY DISEASE, STAGE III (HCC): ICD-10-CM

## 2022-04-12 DIAGNOSIS — E83.42 HYPOMAGNESEMIA: ICD-10-CM

## 2022-04-12 DIAGNOSIS — R79.89 ELEVATED SERUM CREATININE: Primary | ICD-10-CM

## 2022-04-12 DIAGNOSIS — D63.1 ANEMIA DUE TO STAGE 4 CHRONIC KIDNEY DISEASE (HCC): ICD-10-CM

## 2022-04-12 DIAGNOSIS — I50.32 CHRONIC DIASTOLIC CHF (CONGESTIVE HEART FAILURE) (HCC): ICD-10-CM

## 2022-04-12 DIAGNOSIS — R80.1 PERSISTENT PROTEINURIA, UNSPECIFIED: ICD-10-CM

## 2022-04-12 DIAGNOSIS — I12.9 BENIGN HYPERTENSION WITH CHRONIC KIDNEY DISEASE, STAGE III (HCC): ICD-10-CM

## 2022-04-12 PROCEDURE — 99214 OFFICE O/P EST MOD 30 MIN: CPT | Performed by: INTERNAL MEDICINE

## 2022-04-12 NOTE — PROGRESS NOTES
NEPHROLOGY OUTPATIENT PROGRESS NOTE   Jerod Lemonpool  76 y o  male MRN: [id number]  DATE: 4/12/2022  Reason for visit:   Chief Complaint   Patient presents with    Follow-up     CKD 3B       ASSESSMENT and PLAN:  Elevated creatinine on  Chronic Kidney Disease Stage 3b  -Baseline Creatinine: 1 6-1 8 mg/dl, recent cr from 4/8 was 2 5 mg/dl   Currently on torsemide 20 mg bid  -recent hospital admission for fluid overload and was discharged on torsemide 60 mg daily  Cr was 2 6 during hospital stay on admission - likely cardiorenal plus low blood pressure during hospital stay causing elevated creatinine    -Etiology: 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 and age-related nephron loss plus episode of acute kidney injury  -Plan:   · Continue to monitor renal function  · Continue to holding benazepril and hydralazine, which was stopped in the hospital    · Avoid Nephrotoxins like NSAIDs and IV contrast    · Discussed about risk of contrast nephropathy  · CKD Education: not interested  He mentions has been watching [employer] videos    Severe aortic stenosis, plan for TAVR workup with CTA plus cardiac catheterization  I have personally discussed the risks and benefits of the surgery from a renal standpoint with the patient in depth, and advised the patient about the risk of SHOAIB and the course of SHOAIB if it occurs with the small probability of the need for renal replacement therapy in the worse case scenario  Patient voiced understanding       From a renal standpoint the patient is renally optimized for CT study with contrast with the following recommendations:   Fluids to administer: Other - Normal saline 75 mL/hour for 1 hours pre contrast followed by 75 mL/hour for 4 hours post contrast    Medication Recommendations:   Minimize any IV contrast use (If IV contrast is used, please check BMP POD # 1)   Hold NSAIDs for at least 10 days prior to surgery   Currently not on ACE inhibitor   Hold torsemide starting on the day of surgery/procedure/IV contrast   Hemodynamic Recommendations:   Ideally, target MAPs greater than 65 mmHg in the perioperative period, and minimize operative time with MAPs < 65 mmHg   Avoid intraop hemodynamic instability to decrease risk for SHOAIB occurrence   Other Recommendations:   Please consult the Nephrology team when the patient is admitted       Primary Hypertension with chronic kidney disease stage 3:   -Current medication:  Amlodipine 10 mg daily   -Current blood pressure: BP stable and at goal     -Plan:    ? Continue amlodipine daily  As blood pressure stable and creatinine still elevated continue to hold lisinopril and would also continue to hold hydralazine, continue home monitoring of blood pressure  -Recommend 2 g sodium diet  -Recommend daily exercise and weight loss  -Discussed home monitoring of BP and maintaining a log of blood pressure   -Recommend goal BP less than 130/80  Fluid overload/chronic diastolic CHF/severe aortic stenosis  -on torsemide 20 mg twice daily  Dose of torsemide was decreased to 20 mg twice daily on March 31st by Cardiology  -has some crackles both lungs, recommend taking extra 20 mg today and tomorrow and then to go back to 20 mg twice daily  Recommend fluid restriction 1 8 L/min  -says weight same -continue weight monitoring, recommend taking extra torsemide in future if any weight gain more than 3 lb in a day or 5 lb in a week and to follow low-sodium diet     Proteinuria, persistent  -195 microalbumin/cr ratio in dec 2021  -Previous office UA  trace protein, microscopic exam did not show any RBC or WBC trace protein  -likely due to hypertensive nephrosclerosis  -ACEI on hold due to recent elevated creatinine during hospital stay       Hypomagnesemia  On mag oxide 200 mg daily    Had very low magnesium level back in 2015  -last Mag level wnl  -likely due to use of PPI     Anemia due to CKD stage 3b and due to iron deficiency  -hb 9 6, iron stores during hospital admission showed iron saturation 12% and received IV Venofer  Hb improving   -not taking oral iron tablets   -was seen by Hematology Oncology on 02/08/2022     Left nephrolithiasis/left renal cyst  -currently asymptomatic and patient not aware  -reviewed CT abdomen report from December 2021:  Nonobstructing intrarenal calculi the largest in the left lower pole measures 9 mm   No hydronephrosis   Simple parapelvic cyst on the left  - no monitoring needed for renal cyst as it was a simple parapelvic cyst   Discussed with patient and verbalized understanding      Hyperlipidemia  -on Lipitor, continue monitoring lipid panel per PCP, recommend goal LDL less than 100  -last lipid panel from June 2021 showed LDL at goal at 39     DM-2 controlled, with chronic kidney disease stage 3  -now off metformin-continue to hold metformin as GFR less than 30  -last A1c was 5 6  -continue management per PCP     Smoking/tobacco use, stressed  on quitting smoking but patient not interested at this time     Colitis  -recent colonoscopy Colonoscopy: Superficial ulceration in the right colon around ileo colonic anastomotic site  Noted plan for repeat in 3 yrs   Biopsy negative    -continue to follow-up with GI    Patient Instructions   Renal function recently worsening due to cardiorenal syndrome  Discussed about risk of contrast nephropathy with IV contrast  Recommend taking extra torsemide today and tomorrow, then go back to torsemide 20 mg twice daily  Fluid restriction 1 8 L per day and low-sodium diet    Will repeat BMP as outpatient in 2 weeks  Follow-up in 2 months with repeat blood work and urine test         Diagnoses and all orders for this visit:    Elevated serum creatinine  -     Basic metabolic panel; Future  -     Basic metabolic panel; Future    Stage 3b chronic kidney disease ([facility] [address] )  -     Basic metabolic panel; Future  -     CBC; Future  -     Phosphorus;  Future  -     Protein / creatinine ratio, urine; Future  -     PTH, intact; Future  -     Urinalysis with microscopic; Future  -     Basic metabolic panel; Future    Benign hypertension with chronic kidney disease, stage III (HCC)  -     Basic metabolic panel; Future  -     CBC; Future  -     Phosphorus; Future  -     Protein / creatinine ratio, urine; Future  -     PTH, intact; Future  -     Urinalysis with microscopic; Future    Persistent proteinuria, unspecified  -     Protein / creatinine ratio, urine; Future    Chronic diastolic CHF (congestive heart failure) (HCC)  -     Basic metabolic panel; Future    Hypomagnesemia  -     Magnesium; Future    Anemia due to stage 4 chronic kidney disease (HCC)  -     CBC; Future  -     Iron Panel (Includes Ferritin, Iron Sat%, Iron, and TIBC); Future            SUBJECTIVE / HPI:  Saji Albright  is a 76 y o   male with medical issues of chronic kidney disease, HTN x 15 yrs,  DM-2 anemia, colitis  , CAD s/p CABG who presents for follow-up of chronic kidney disease stage 3  Review of records, patient has elevated creatinine dating back to 2018 November when the creatinine was 1 3  It was stable at 1 4 to 1 7 in 2020  Since June 2021 creatinine has been around 1 6-1 8  It had worsened to creatinine 2 49 on 12/14/21, likely prerenal in the setting of abdominal pain and diarrhea and renal function improved to creatinine 1 7-1 8 in January 2022  She was admitted to hospital in March 2022 with admission creatinine of 2 6  She was diagnosed as having acute CHF/fluid overload, was diuresed  A echo showed ejection fraction 50% with grade 2 diastolic dysfunction  Was discharged on torsemide 60 mg daily  Discharge creatinine was 2 5    Outpatient blood work from 03/28 showed creatinine 2 8    Repeat blood work on 04/08 showed creatinine improving to 2 56, magnesium 2 5, iron saturation 12%    C/o on SOB on laying flat  Occasional with feet edema         REVIEW OF SYSTEMS:    Review of Systems   Constitutional: Negative for chills and fever  HENT: Negative for ear pain and sore throat  Eyes: Negative for pain and visual disturbance  Respiratory: Positive for shortness of breath  Negative for cough  Cardiovascular: Negative for chest pain and palpitations  Gastrointestinal: Negative for abdominal pain and vomiting  Genitourinary: Negative for dysuria and hematuria  Musculoskeletal: Negative for arthralgias and back pain  Skin: Negative for color change and rash  Neurological: Negative for seizures and syncope  All other systems reviewed and are negative  More than 10 point review of systems were obtained and discussed in length with the patient  Complete review of systems were negative / unremarkable except mentioned above         PAST MEDICAL HISTORY:  Past Medical History:   Diagnosis Date    CAD (coronary artery disease)     Carotid stenosis, asymptomatic, bilateral     Chronic kidney disease     Diabetes ([facility] [address] )     type 2, non-insulin dependent    GERD (gastroesophageal reflux disease)     History of nephrolithiasis     Hyperlipidemia     Hypertension     PAD [facility]peripheral artery disease) [facility])     Severe aortic stenosis        PAST SURGICAL HISTORY:  Past Surgical History:   Procedure Laterality Date    APPENDECTOMY      COLECTOMY      COLONOSCOPY  2013    CORONARY ARTERY BYPASS GRAFT  2013    X 2    FEMORAL ARTERY - POPLITEAL ARTERY BYPASS GRAFT      HEMORRHOID SURGERY      IR AORTAGRAM WITH RUN-OFF  11/19/2018    VT SLCTV CATHJ 3RD+ ORD SLCTV ABDL PEL/LXTR [facility] Left 8/12/2016    Procedure: LEFT FEMORAL ARTERIOGRAM; BALLOON ANGIOPLASTY; SFA  AND FEMORAL AT VEIN GRAFT;  Surgeon: Juan Junior MD;  Location: [facility];  Service: Vascular    TONSILLECTOMY AND ADENOIDECTOMY         SOCIAL HISTORY:  Social History     Substance and Sexual Activity   Alcohol Use Not Currently    Comment: occasional     Social History     Substance and Sexual Activity Drug Use No     Social History     Tobacco Use   Smoking Status Current Every Day Smoker    Packs/day: 0 25    Years: 50 00    Pack years: 12 50    Types: Cigarettes   Smokeless Tobacco Never Used   Tobacco Comment    4-5 cigarettes daily        FAMILY HISTORY:  Family History   Problem Relation Age of Onset    Heart attack Father     Other Sister         bypass and vlave replacement    Stroke Paternal Uncle     Arrhythmia Neg Hx     Asthma Neg Hx     Clotting disorder Neg Hx     Fainting Neg Hx     Anuerysm Neg Hx     Hypertension Neg Hx         unsure     Hyperlipidemia Neg Hx     Heart failure Neg Hx        MEDICATIONS:    Current Outpatient Medications:     amLODIPine (NORVASC) 10 mg tablet, TAKE 1 TABLET DAILY, Disp: 90 tablet, Rfl: 3    AMYLASE-LIPASE-PROTEASE PO, Take by mouth , Disp: , Rfl:     aspirin (ECOTRIN LOW STRENGTH) 81 mg EC tablet, Take 81 mg by mouth daily, Disp: , Rfl:     atorvastatin (LIPITOR) 80 mg tablet, TAKE 1 TABLET DAILY AT     BEDTIME, Disp: 90 tablet, Rfl: 3    clopidogrel (PLAVIX) 75 mg tablet, TAKE 1 TABLET DAILY, Disp: 90 tablet, Rfl: 3    Cyanocobalamin (VITAMIN B12 PO), Take by mouth once a week , Disp: , Rfl:     Magnesium Oxide (MAG-200 PO), Take by mouth once a week , Disp: , Rfl:     Multiple Vitamin (MULTIVITAMIN) tablet, Take 1 tablet by mouth daily 1-2 times a week , Disp: , Rfl:     pantoprazole (PROTONIX) 40 mg tablet, Take 1 tablet (40 mg total) by mouth daily, Disp: 90 tablet, Rfl: 1    torsemide (DEMADEX) 20 mg tablet, Take 1 tablet (20 mg total) by mouth 2 (two) times a day, Disp: 180 tablet, Rfl: 3      PHYSICAL EXAM:  Vitals:    04/12/22 0951   BP: 130/58   BP Location: Left arm   Patient Position: Sitting   Cuff Size: Adult   Pulse: 72   Weight: 77 6 kg (171 lb)   Height: 5' 10" (1 778 m)     Body mass index is 24 54 kg/m²  Physical Exam  Constitutional:       General: He is not in acute distress  Appearance: He is well-developed  He is not diaphoretic  HENT:      Head: Normocephalic and atraumatic  Mouth/Throat:      Mouth: Mucous membranes are moist    Eyes:      General: No scleral icterus  Conjunctiva/sclera: Conjunctivae normal       Pupils: Pupils are equal, round, and reactive to light  Neck:      Thyroid: No thyromegaly  Cardiovascular:      Rate and Rhythm: Normal rate and regular rhythm  Heart sounds: Murmur (ESM+ ) heard  No friction rub  Pulmonary:      Effort: Pulmonary effort is normal  No respiratory distress  Breath sounds: Rales present  No wheezing  Abdominal:      General: Bowel sounds are normal  There is no distension  Palpations: Abdomen is soft  Tenderness: There is no abdominal tenderness  Musculoskeletal:         General: No deformity  Cervical back: Neck supple  Right lower leg: No edema  Left lower leg: No edema  Lymphadenopathy:      Cervical: No cervical adenopathy  Skin:     Coloration: Skin is not pale  Nails: There is no clubbing  Neurological:      Mental Status: He is alert and oriented to person, place, and time  He is not disoriented  Psychiatric:         Mood and Affect: Mood normal  Mood is not anxious  Affect is not inappropriate  Behavior: Behavior normal          Thought Content:  Thought content normal          Lab Results:   Results for orders placed or performed in visit on 69/00/81   Basic metabolic panel   Result Value Ref Range    Sodium 140 136 - 145 mmol/L    Potassium 4 2 3 5 - 5 3 mmol/L    Chloride 106 100 - 108 mmol/L    CO2 28 21 - 32 mmol/L    ANION GAP 6 4 - 13 mmol/L    BUN 42 (H) 5 - 25 mg/dL    Creatinine 2 56 (H) 0 60 - 1 30 mg/dL    Glucose, Fasting 131 (H) 65 - 99 mg/dL    Calcium 9 2 8 3 - 10 1 mg/dL    eGFR 22 ml/min/1 73sq m   Magnesium   Result Value Ref Range    Magnesium 2 5 1 6 - 2 6 mg/dL   CBC and differential   Result Value Ref Range    WBC 4 40 4 31 - 10 16 Thousand/uL    RBC 3 42 (L) 3 88 - 5 62 Million/uL    Hemoglobin 9 6 (L) 12 0 - 17 0 g/dL    Hematocrit 32 4 (L) 36 5 - 49 3 %    MCV 95 82 - 98 fL    MCH 28 1 26 8 - 34 3 pg    MCHC 29 6 (L) 31 4 - 37 4 g/dL    RDW 18 0 (H) 11 6 - 15 1 %    MPV 11 2 8 9 - 12 7 fL    Platelets 720 452 - 544 Thousands/uL    nRBC 0 /100 WBCs    Neutrophils Relative 61 43 - 75 %    Immat GRANS % 1 0 - 2 %    Lymphocytes Relative 25 14 - 44 %    Monocytes Relative 8 4 - 12 %    Eosinophils Relative 4 0 - 6 %    Basophils Relative 1 0 - 1 %    Neutrophils Absolute 2 74 1 85 - 7 62 Thousands/µL    Immature Grans Absolute 0 03 0 00 - 0 20 Thousand/uL    Lymphocytes Absolute 1 08 0 60 - 4 47 Thousands/µL    Monocytes Absolute 0 36 0 17 - 1 22 Thousand/µL    Eosinophils Absolute 0 17 0 00 - 0 61 Thousand/µL    Basophils Absolute 0 02 0 00 - 0 10 Thousands/µL

## 2022-04-12 NOTE — PATIENT INSTRUCTIONS
Renal function recently worsening due to cardiorenal syndrome  Discussed about risk of contrast nephropathy with IV contrast  Recommend taking extra torsemide today and tomorrow, then go back to torsemide 20 mg twice daily    Fluid restriction 1 8 L per day and low-sodium diet    Will repeat BMP as outpatient in 2 weeks  Follow-up in 2 months with repeat blood work and urine test

## 2022-04-14 ENCOUNTER — HOSPITAL ENCOUNTER (OUTPATIENT)
Facility: HOSPITAL | Age: 77
Discharge: HOME | End: 2022-04-15
Attending: INTERNAL MEDICINE | Admitting: INTERNAL MEDICINE
Payer: MEDICARE

## 2022-04-14 ENCOUNTER — HOSPITAL ENCOUNTER (OUTPATIENT)
Dept: PULMONOLOGY | Facility: HOSPITAL | Age: 77
Discharge: HOME | End: 2022-04-14
Payer: MEDICARE

## 2022-04-14 DIAGNOSIS — Z01.818 PRE-OP TESTING: ICD-10-CM

## 2022-04-14 DIAGNOSIS — N18.4 STAGE 4 CHRONIC KIDNEY DISEASE (HCC): ICD-10-CM

## 2022-04-14 DIAGNOSIS — N18.32 STAGE 3B CHRONIC KIDNEY DISEASE (HCC): ICD-10-CM

## 2022-04-14 DIAGNOSIS — I35.0 NONRHEUMATIC AORTIC VALVE STENOSIS: ICD-10-CM

## 2022-04-14 DIAGNOSIS — K86.9 PANCREATIC DISEASE: Primary | ICD-10-CM

## 2022-04-14 DIAGNOSIS — F17.200 SMOKER: ICD-10-CM

## 2022-04-14 LAB
ANION GAP SERPL CALCULATED.3IONS-SCNC: 5 MMOL/L (ref 4–13)
BUN SERPL-MCNC: 38 MG/DL (ref 5–25)
CALCIUM SERPL-MCNC: 9 MG/DL (ref 8.3–10.1)
CHLORIDE SERPL-SCNC: 104 MMOL/L (ref 100–108)
CO2 SERPL-SCNC: 29 MMOL/L (ref 21–32)
CREAT SERPL-MCNC: 2.66 MG/DL (ref 0.6–1.3)
GFR SERPL CREATININE-BSD FRML MDRD: 21 ML/MIN/1.73SQ M
GLUCOSE SERPL-MCNC: 269 MG/DL (ref 65–140)
POTASSIUM SERPL-SCNC: 3.8 MMOL/L (ref 3.5–5.3)
SODIUM SERPL-SCNC: 138 MMOL/L (ref 136–145)

## 2022-04-14 PROCEDURE — 94729 DIFFUSING CAPACITY: CPT | Performed by: INTERNAL MEDICINE

## 2022-04-14 PROCEDURE — 94760 N-INVAS EAR/PLS OXIMETRY 1: CPT

## 2022-04-14 PROCEDURE — 99215 OFFICE O/P EST HI 40 MIN: CPT | Performed by: INTERNAL MEDICINE

## 2022-04-14 PROCEDURE — 94729 DIFFUSING CAPACITY: CPT

## 2022-04-14 PROCEDURE — 94726 PLETHYSMOGRAPHY LUNG VOLUMES: CPT | Performed by: INTERNAL MEDICINE

## 2022-04-14 PROCEDURE — 80048 BASIC METABOLIC PNL TOTAL CA: CPT | Performed by: INTERNAL MEDICINE

## 2022-04-14 PROCEDURE — 94726 PLETHYSMOGRAPHY LUNG VOLUMES: CPT

## 2022-04-14 PROCEDURE — 94060 EVALUATION OF WHEEZING: CPT

## 2022-04-14 PROCEDURE — NC001 PR NO CHARGE: Performed by: INTERNAL MEDICINE

## 2022-04-14 PROCEDURE — 94060 EVALUATION OF WHEEZING: CPT | Performed by: INTERNAL MEDICINE

## 2022-04-14 RX ORDER — ACETYLCYSTEINE 200 MG/ML
1200 SOLUTION ORAL; RESPIRATORY (INHALATION) 2 TIMES DAILY
Status: DISCONTINUED | OUTPATIENT
Start: 2022-04-14 | End: 2022-04-15 | Stop reason: HOSPADM

## 2022-04-14 RX ORDER — CLOPIDOGREL BISULFATE 75 MG/1
75 TABLET ORAL DAILY
Status: DISCONTINUED | OUTPATIENT
Start: 2022-04-15 | End: 2022-04-15 | Stop reason: HOSPADM

## 2022-04-14 RX ORDER — AMLODIPINE BESYLATE 10 MG/1
10 TABLET ORAL DAILY
Status: DISCONTINUED | OUTPATIENT
Start: 2022-04-15 | End: 2022-04-15 | Stop reason: HOSPADM

## 2022-04-14 RX ORDER — ATORVASTATIN CALCIUM 80 MG/1
80 TABLET, FILM COATED ORAL
Status: DISCONTINUED | OUTPATIENT
Start: 2022-04-14 | End: 2022-04-15 | Stop reason: HOSPADM

## 2022-04-14 RX ORDER — ASPIRIN 81 MG/1
81 TABLET ORAL DAILY
Status: DISCONTINUED | OUTPATIENT
Start: 2022-04-15 | End: 2022-04-15 | Stop reason: HOSPADM

## 2022-04-14 RX ORDER — PANTOPRAZOLE SODIUM 40 MG/1
40 TABLET, DELAYED RELEASE ORAL
Status: DISCONTINUED | OUTPATIENT
Start: 2022-04-15 | End: 2022-04-15 | Stop reason: HOSPADM

## 2022-04-14 RX ORDER — SODIUM CHLORIDE 9 MG/ML
50 INJECTION, SOLUTION INTRAVENOUS CONTINUOUS
Status: DISPENSED | OUTPATIENT
Start: 2022-04-15 | End: 2022-04-15

## 2022-04-14 RX ORDER — ALBUTEROL SULFATE 2.5 MG/3ML
2.5 SOLUTION RESPIRATORY (INHALATION) ONCE
Status: COMPLETED | OUTPATIENT
Start: 2022-04-14 | End: 2022-04-14

## 2022-04-14 RX ADMIN — ACETYLCYSTEINE 1200 MG: 200 SOLUTION ORAL; RESPIRATORY (INHALATION) at 11:13

## 2022-04-14 RX ADMIN — ALBUTEROL SULFATE 2.5 MG: 2.5 SOLUTION RESPIRATORY (INHALATION) at 09:16

## 2022-04-14 RX ADMIN — PANCRELIPASE 24000 UNITS: 24000; 76000; 120000 CAPSULE, DELAYED RELEASE PELLETS ORAL at 16:51

## 2022-04-14 RX ADMIN — ACETYLCYSTEINE 1200 MG: 200 SOLUTION ORAL; RESPIRATORY (INHALATION) at 20:34

## 2022-04-14 RX ADMIN — ATORVASTATIN CALCIUM 80 MG: 80 TABLET, FILM COATED ORAL at 21:07

## 2022-04-14 RX ADMIN — PANCRELIPASE 24000 UNITS: 24000; 76000; 120000 CAPSULE, DELAYED RELEASE PELLETS ORAL at 11:13

## 2022-04-14 NOTE — H&P
H&P Exam - Cardiology   Jose Lopes  76 y o  male MRN: [id number]  Unit/Bed#: -01 Encounter: [id number]     Office cardiologist: Dr Thuan Corona MD        Assessment/Plan     Severe symptomatic Aortic stenosis  CKD 4      BP (!) 111/33   Wt 77 1 kg (170 lb)   BMI 24 39 kg/m²       History of Present Illness   HPI:  Jose Lopes  is a 76 y o  male with symptomatic severe Aortic stenosis, CAD s/p CABG, HTN ,HDL and CKD stage 4   Patient is electively admitted for CTA of chest , Abdomen and pelvis TAVR protocol  Patient's GRF baseline is 20-22 and CR 2 56 -2 85  Nephrology was consulted for perioperative optimization to reduce incidence for acute kidney injury  CTA is scheduled for 0830 tomorrow  Torsemide has been held today       Historical Information   Past Medical History:   Diagnosis Date    CAD (coronary artery disease)     Carotid stenosis, asymptomatic, bilateral     Chronic kidney disease     Diabetes ([facility] [address] )     type 2, non-insulin dependent    GERD (gastroesophageal reflux disease)     History of nephrolithiasis     Hyperlipidemia     Hypertension     PAD (peripheral artery disease) ([facility])     Severe aortic stenosis      Past Surgical History:   Procedure Laterality Date    APPENDECTOMY      COLECTOMY      COLONOSCOPY  2013    CORONARY ARTERY BYPASS GRAFT  2013    X 2    FEMORAL ARTERY - POPLITEAL ARTERY BYPASS GRAFT      HEMORRHOID SURGERY      IR AORTAGRAM WITH RUN-OFF  11/19/2018    AL SLCTV CATHJ 3RD+ ORD SLCTV ABDL PEL/LXTR [facility] Left 8/12/2016    Procedure: LEFT FEMORAL ARTERIOGRAM; BALLOON ANGIOPLASTY; SFA  AND FEMORAL AT VEIN GRAFT;  Surgeon: Franchesca Adams MD;  [facility];  Service: Vascular    TONSILLECTOMY AND ADENOIDECTOMY       Social History   Social History     Substance and Sexual Activity   Alcohol Use Not Currently    Comment: occasional     Social History     Substance and Sexual Activity   Drug Use No     Social History     Tobacco Use   Smoking Status Current Every Day Smoker    Packs/day: 0 25    Years: 50 00    Pack years: 12 50    Types: Cigarettes   Smokeless Tobacco Never Used   Tobacco Comment    4-5 cigarettes daily      Family History:   Family History   Problem Relation Age of Onset    Heart attack Father     Other Sister         bypass and vlave replacement    Stroke Paternal Uncle     Arrhythmia Neg Hx     Asthma Neg Hx     Clotting disorder Neg Hx     Fainting Neg Hx     Anuerysm Neg Hx     Hypertension Neg Hx         unsure     Hyperlipidemia Neg Hx     Heart failure Neg Hx        Meds/Allergies   PTA meds:   Prior to Admission Medications   Prescriptions Last Dose Informant Patient Reported? Taking? AMYLASE-LIPASE-PROTEASE PO  Self Yes No   Sig: Take by mouth    Cyanocobalamin (VITAMIN B12 PO)  Self Yes No   Sig: Take by mouth once a week    Magnesium Oxide (MAG-200 PO)  Self Yes No   Sig: Take by mouth once a week    Multiple Vitamin (MULTIVITAMIN) tablet  Self Yes No   Sig: Take 1 tablet by mouth daily 1-2 times a week    amLODIPine (NORVASC) 10 mg tablet  Self No No   Sig: TAKE 1 TABLET DAILY   aspirin (ECOTRIN LOW STRENGTH) 81 mg EC tablet  Self Yes No   Sig: Take 81 mg by mouth daily   atorvastatin (LIPITOR) 80 mg tablet  Self No No   Sig: TAKE 1 TABLET DAILY AT     BEDTIME   clopidogrel (PLAVIX) 75 mg tablet  Self No No   Sig: TAKE 1 TABLET DAILY   pantoprazole (PROTONIX) 40 mg tablet  Self No No   Sig: Take 1 tablet (40 mg total) by mouth daily   torsemide (DEMADEX) 20 mg tablet  Self No No   Sig: Take 1 tablet (20 mg total) by mouth 2 (two) times a day      Facility-Administered Medications: None     No Known Allergies    Review of Systems   All other systems reviewed and are negative  Physical Exam  Constitutional:       Appearance: Normal appearance  HENT:      Head: Normocephalic and atraumatic        Mouth/Throat:      Mouth: Mucous membranes are moist    Cardiovascular: Rate and Rhythm: Normal rate and regular rhythm  Pulses: Normal pulses  Heart sounds: Murmur heard  Pulmonary:      Effort: Pulmonary effort is normal       Breath sounds: Normal breath sounds  Abdominal:      General: Abdomen is flat  Bowel sounds are normal       Palpations: Abdomen is soft  Musculoskeletal:         General: Swelling present  Cervical back: Neck supple  Comments: B/l foot edema   Skin:     General: Skin is warm and dry  Capillary Refill: Capillary refill takes 2 to 3 seconds  Neurological:      Mental Status: He is alert and oriented to person, place, and time  Psychiatric:         Behavior: Behavior normal          EKG: NSR with PVCs  ECHO:EF 50%  Moderate moderate hypokensis basal to mid inferior wall

## 2022-04-14 NOTE — PLAN OF CARE
Problem: Nutrition/Hydration-ADULT  Goal: Nutrient/Hydration intake appropriate for improving, restoring or maintaining nutritional needs  Description: Monitor and assess patient's nutrition/hydration status for malnutrition  Collaborate with interdisciplinary team and initiate plan and interventions as ordered  Monitor patient's weight and dietary intake as ordered or per policy  Utilize nutrition screening tool and intervene as necessary  Determine patient's food preferences and provide high-protein, high-caloric foods as appropriate       INTERVENTIONS:  - Monitor oral intake, urinary output, labs, and treatment plans  - Assess nutrition and hydration status and recommend course of action  - Evaluate amount of meals eaten  - Assist patient with eating if necessary   - Allow adequate time for meals  - Recommend/ encourage appropriate diets, oral nutritional supplements, and vitamin/mineral supplements  - Order, calculate, and assess calorie counts as needed  - Recommend, monitor, and adjust tube feedings and TPN/PPN based on assessed needs  - Assess need for intravenous fluids  - Provide specific nutrition/hydration education as appropriate  - Include patient/family/caregiver in decisions related to nutrition  Outcome: Progressing     Problem: Potential for Falls  Goal: Patient will remain free of falls  Description: INTERVENTIONS:  - Educate patient/family on patient safety including physical limitations  - Instruct patient to call for assistance with activity   - Consult OT/PT to assist with strengthening/mobility   - Keep Call bell within reach  - Keep bed low and locked with side rails adjusted as appropriate  - Keep care items and personal belongings within reach  - Initiate and maintain comfort rounds  - Consider moving patient to room near nurses station  Outcome: Progressing

## 2022-04-14 NOTE — CONSULTS
[address]  76 y o  male MRN: [id number]  Unit/Bed#: -47 Encounter: [id number]    ASSESSMENT and PLAN:    75-year-old male with a history of chronic kidney disease stage IV, congestive heart failure, hypertension with severe aortic stenosis was planned for TAVR, requiring a CTA tomorrow  Nephrology consult for reduction of contrast associated nephropathy  Chronic Kidney Disease Stage IV  -- outpatient nephrologist: Dr Ángel German  -- baseline creatinine:  Prior baseline creatinine had been 1 6-1 8 mg/dL, but after recent hospitalization for volume overload and congestive heart failure his creatinine has more recently stabilized in mid 2's, likely representing a new baseline creatinine due to poor renal reserve and to keep euvolemic  -- Etiology:  Cardiorenal syndrome, hypertensive nephrosclerosis, arteriolar sclerosis, episodes of acute kidney injury  -- blood work from April 8th showed creatinine to be stable at 2 56 mg/dL  -- diuretic currently on hold  -- risks of contrast associated nephropathy were discussed at length with the patient and he understands them, which include dialysis dependent renal failure  -- given his underlying congestive heart failure and severe aortic stenosis and recent issues with volume overload cannot give a high degree of intravenous fluids as this could compromise his volume status  The goal should be euvolemia to help reduce the risk of contrast associated nephropathy    -- his volume status appears to have improved will plan for normal saline at 50 mL/hour 1 hour pre CTA and to continue 3 hours post CTA for total of 4 hours (ordered)  -- check a BMP in the morning  -- Mucomyst 1200 mg twice a day x4 doses  --patient's approximate risk of contrast associated nephropathy is 57% with a risk of dialysis of 12 6%    Blood pressure/volume status  --history of hypertension  --recently had volume overload secondary to cardiorenal syndrome with a component of advanced chronic kidney disease  --volume status has improved  --takes torsemide at home which is currently on hold  --plan for gentle normal saline as detailed above  --plan to restart diuretic upon discharge    Anemia of chronic disease  --most recent hemoglobin stable at 9 6 and has improved    Severe aortic stenosis  --currently under process for TAVR  --planning for CTA tomorrow morning  --still requires a cardiac catheterization      SUMMARY OF RECOMMENDATIONS:    Please see above    HISTORY OF PRESENT ILLNESS:  Requesting Physician: Brody Sanders MD  Reason for Consult:  Chronic kidney disease with need of a contrast study    Renny Henson  is a 76 y o  male who was admitted to [facility] after presenting with TAVR workup  A renal consultation is requested today for assistance in the management of chronic kidney disease with need of a contrast study  Patient was recently seen by my colleague Dr Sonya Dunne 2 days ago for evaluation of TAVR and acute kidney injury risk reduction  His baseline more recently has been in the mid 2s to maintain his volume status and due to recent exacerbation of congestive heart failure  His creatinine more recently has been 2 5 mg/dL  His volume status has improved  Risks and benefits were discussed at length including dialysis dependent renal failure  No chest pain at this time  Swelling has improved to some degree  Reports no urinary symptoms      PAST MEDICAL HISTORY:  Past Medical History:   Diagnosis Date    CAD (coronary artery disease)     Carotid stenosis, asymptomatic, bilateral     Chronic kidney disease     Diabetes ([facility] [address] )     type 2, non-insulin dependent    GERD (gastroesophageal reflux disease)     History of nephrolithiasis     Hyperlipidemia     Hypertension     PAD (peripheral artery disease) (HCC)     Severe aortic stenosis        PAST SURGICAL HISTORY:  Past Surgical History:   Procedure Laterality Date    APPENDECTOMY      COLECTOMY      COLONOSCOPY  2013    CORONARY ARTERY BYPASS GRAFT  2013    X 2    FEMORAL ARTERY - POPLITEAL ARTERY BYPASS GRAFT      HEMORRHOID SURGERY      IR AORTAGRAM WITH RUN-OFF  11/19/2018    NH SLCTV CATHJ 3RD+ ORD SLCTV ABDL PEL/LXTR [facility] Left 8/12/2016    Procedure: LEFT FEMORAL ARTERIOGRAM; BALLOON ANGIOPLASTY; SFA  AND FEMORAL AT VEIN GRAFT;  Surgeon: Sonya Pleitez MD;  Location: [facility];  Service: Vascular    TONSILLECTOMY AND ADENOIDECTOMY         ALLERGIES:  No Known Allergies    SOCIAL HISTORY:  Social History     Substance and Sexual Activity   Alcohol Use Not Currently    Comment: occasional     Social History     Substance and Sexual Activity   Drug Use No     Social History     Tobacco Use   Smoking Status Current Every Day Smoker    Packs/day: 0 25    Years: 50 00    Pack years: 12 50    Types: Cigarettes   Smokeless Tobacco Never Used   Tobacco Comment    4-5 cigarettes daily        FAMILY HISTORY:  Family History   Problem Relation Age of Onset    Heart attack Father     Other Sister         bypass and vlave replacement    Stroke Paternal Uncle     Arrhythmia Neg Hx     Asthma Neg Hx     Clotting disorder Neg Hx     Fainting Neg Hx     Anuerysm Neg Hx     Hypertension Neg Hx         unsure     Hyperlipidemia Neg Hx     Heart failure Neg Hx        MEDICATIONS:    Current Facility-Administered Medications:     acetylcysteine (MUCOMYST) 200 mg/mL oral solution 1,200 mg, 1,200 mg, Oral, BID, Zeferino Saldana MD    [START ON 4/15/2022] amLODIPine (NORVASC) tablet 10 mg, 10 mg, Oral, Daily, ZHANNA Paul    [START ON 4/15/2022] aspirin (ECOTRIN LOW STRENGTH) EC tablet 81 mg, 81 mg, Oral, Daily, ZHANNA Paul    atorvastatin (LIPITOR) tablet 80 mg, 80 mg, Oral, HS, ZHANNA Paul    [START ON 4/15/2022] clopidogrel (PLAVIX) tablet 75 mg, 75 mg, Oral, Daily, ZHANNA Paul    pancrelipase (Lip-Prot-Amyl) (CREON) delayed release capsule 24,000 Units, 24,000 Units, Oral, TID With Meals, ZHANNA Troncoso    [START ON 4/15/2022] pantoprazole (PROTONIX) EC tablet 40 mg, 40 mg, Oral, Early Morning, ZHANNA Troncoso    [START ON 4/15/2022] sodium chloride 0 9 % infusion, 50 mL/hr, Intravenous, Continuous, Didier Shane MD    REVIEW OF SYSTEMS:  Constitutional: Negative for fatigue, anorexia, fever, chills, diaphoresis  HENT: Negative for postnasal drip  Eyes: Negative for visual disturbance  Respiratory: Negative for cough, shortness of breath and wheezing  Cardiovascular: Negative for chest pain, palpitations and leg swelling  Gastrointestinal: Negative for abdominal pain, constipation, diarrhea, nausea and vomiting  Genitourinary: No dysuria, hematuria  Endocrine: Negative for polyuria  Musculoskeletal: Negative for arthralgias, back pain and joint swelling  Skin: Negative for rash  Neurological: Negative for focal weakness, headaches, dizziness  Hematological: Negative for easy bruising or bleeding  Psychiatric/Behavioral: Negative for confusion and sleep disturbance  All the systems were reviewed and were negative except as documented on the HPI  PHYSICAL EXAM:  Current Weight:    First Weight:    There were no vitals filed for this visit  No intake or output data in the 24 hours ending 04/14/22 1107  Physical Exam  Vitals and nursing note reviewed  Exam conducted with a chaperone present  Constitutional:       General: He is not in acute distress  Appearance: He is well-developed  HENT:      Head: Normocephalic and atraumatic  Eyes:      General: No scleral icterus  Conjunctiva/sclera: Conjunctivae normal       Pupils: Pupils are equal, round, and reactive to light  Cardiovascular:      Rate and Rhythm: Normal rate and regular rhythm  Heart sounds: S1 normal and S2 normal  Murmur heard  No friction rub  No gallop      Pulmonary:      Effort: Pulmonary effort is normal  No respiratory distress  Breath sounds: Normal breath sounds  No wheezing or rales  Abdominal:      General: Bowel sounds are normal       Palpations: Abdomen is soft  Tenderness: There is no abdominal tenderness  There is no rebound  Musculoskeletal:         General: Normal range of motion  Cervical back: Normal range of motion and neck supple  Right lower leg: Edema present  Left lower leg: Edema present  Skin:     Findings: No rash  Neurological:      Mental Status: He is alert and oriented to person, place, and time     Psychiatric:         Behavior: Behavior normal            Invasive Devices:      Lab Results:   Results from last 7 days   Lab Units 04/08/22  1142   WBC Thousand/uL 4 40   HEMOGLOBIN g/dL 9 6*   HEMATOCRIT % 32 4*   PLATELETS Thousands/uL 201   POTASSIUM mmol/L 4 2   CHLORIDE mmol/L 106   CO2 mmol/L 28   BUN mg/dL 42*   CREATININE mg/dL 2 56*   CALCIUM mg/dL 9 2   MAGNESIUM mg/dL 2 5

## 2022-04-14 NOTE — DISCHARGE SUMMARY
Discharge Summary - Clover Flower  76 y o  male MRN: [id number]    Unit/Bed#: -55 Encounter: [id number]    Admission Date: 4/14/2022   Discharge Date:  04/15/2022    Disposition: Home    Condition at Discharge: thanh Montez MD      OP Cardiologist:  Dr Elle Oneal        Admitting Diagnosis:  Severe symptomatic aortic stenosis  Chronic kidney disease stage 4    Secondary Diagnoses: *  CAD status post CABG x2  Hypertension  Hyperlipidemia      Discharge Diagnosis:  Severe symptomatic aortic stenosis      Consultants:  Nephrology, Dr Colleen Lanes    Procedures:  CTA of chest abdomen and pelvis TAVR protocol          BP (!) 111/33   Wt 77 1 kg (170 lb)   BMI 24 39 kg/m²       Review of Systems   All other systems reviewed and are negative  Physical Exam  Constitutional:       Appearance: Normal appearance  HENT:      Head: Normocephalic  Mouth/Throat:      Mouth: Mucous membranes are moist    Cardiovascular:      Rate and Rhythm: Normal rate and regular rhythm  Pulses: Normal pulses  Heart sounds: Murmur heard  Pulmonary:      Breath sounds: Normal breath sounds  Abdominal:      General: Abdomen is flat  Bowel sounds are normal       Palpations: Abdomen is soft  Musculoskeletal:      Cervical back: Neck supple  Right lower leg: Edema present  Left lower leg: Edema present  Skin:     General: Skin is warm and dry  Capillary Refill: Capillary refill takes 2 to 3 seconds  Neurological:      Mental Status: He is alert and oriented to person, place, and time  Psychiatric:         Behavior: Behavior normal              HPI and Hospital Course:   58-year-old male with severe symptomatic aortic stenosis, CAD status post CABG x2 chronic kidney disease stage 4, hypertension, hyperlipidemia  Patient was electively admitted for CTA of the chest abdomen and pelvis TAVR protocol  His admission GFR was 21 and his creatinine was 2 66    Yes his discharge GFR is 27and his discharge creatinine is 2 19  He will follow-up with a BMP on Tuesday  Dr Riana Morrell office will call him for follow-up to complete his pre TAVR evaluation        Current Facility-Administered Medications   Medication Dose Route Frequency    acetylcysteine (MUCOMYST) 200 mg/mL oral solution 1,200 mg  1,200 mg Oral BID    [START ON 4/15/2022] amLODIPine (NORVASC) tablet 10 mg  10 mg Oral Daily    [START ON 4/15/2022] aspirin (ECOTRIN LOW STRENGTH) EC tablet 81 mg  81 mg Oral Daily    atorvastatin (LIPITOR) tablet 80 mg  80 mg Oral HS    [START ON 4/15/2022] clopidogrel (PLAVIX) tablet 75 mg  75 mg Oral Daily    pancrelipase (Lip-Prot-Amyl) (CREON) delayed release capsule 24,000 Units  24,000 Units Oral TID With Meals    [START ON 4/15/2022] pantoprazole (PROTONIX) EC tablet 40 mg  40 mg Oral Early Morning    [START ON 4/15/2022] sodium chloride 0 9 % infusion  50 mL/hr Intravenous Continuous       Pertinent Labs/diagnostics:        Lab Ressults:  Recent Results (from the past 24 hour(s))   Basic metabolic panel    Collection Time: 04/14/22 11:09 AM   Result Value Ref Range    Sodium 138 136 - 145 mmol/L    Potassium 3 8 3 5 - 5 3 mmol/L    Chloride 104 100 - 108 mmol/L    CO2 29 21 - 32 mmol/L    ANION GAP 5 4 - 13 mmol/L    BUN 38 (H) 5 - 25 mg/dL    Creatinine 2 66 (H) 0 60 - 1 30 mg/dL    Glucose 269 (H) 65 - 140 mg/dL    Calcium 9 0 8 3 - 10 1 mg/dL    eGFR 21 ml/min/1 73sq m           Lipid Profile:   No results found for: CHOL  Lab Results   Component Value Date    HDL 31 (L) 06/30/2021    HDL 39 (L) 09/15/2020    HDL 36 (L) 01/23/2020     Lab Results   Component Value Date    LDLCALC 36 06/30/2021    LDLCALC 36 09/15/2020    LDLCALC 52 01/23/2020     Lab Results   Component Value Date    TRIG 90 06/30/2021    TRIG 85 09/15/2020    TRIG 81 01/23/2020       Discharge instructions/Information to patient and family:   See after visit summary for information provided to patient and family  Provisions for Follow-Up Care:  See after visit summary for information related to follow-up care and any pertinent home health orders  Planned Readmission: Yes cardiac catheterization and then TAVR procedure    Discharge Statement:  I spent 30 minutes minutes discharging the patient  This time was spent on the day of discharge  I had direct contact with the patient on the day of discharge  Additional documentation is required if more than 30 minutes were spent on discharge  ** Please Note: Fluency Direct Dictation voice to text software may have been used in the creation of this document   **

## 2022-04-14 NOTE — DISCHARGE INSTRUCTIONS
BMP on Monday     Aortic Stenosis   WHAT YOU NEED TO KNOW:   Aortic stenosis is a condition that makes your aortic valve become narrow and stiff  The narrow, stiff valve causes your heart to work harder to pump blood into the aorta  DISCHARGE INSTRUCTIONS:   Call your local emergency number (911 in the [address] [facility]) or have someone call if:   · You have any of the following signs of a heart attack:      ? Squeezing, pressure, or pain in your chest    ? You may  also have any of the following:     § Discomfort or pain in your back, neck, jaw, stomach, or arm    § Shortness of breath    § Nausea or vomiting    § Lightheadedness or a sudden cold sweat    · You have any of the following signs of a stroke:      ? Numbness or drooping on one side of your face     ? Weakness in an arm or leg    ? Confusion or difficulty speaking    ? Dizziness, a severe headache, or vision loss    Seek care immediately if:   · You have chest pain when you move around  It goes away when you are still  · You have increasing shortness of breath  · You faint  Call your doctor or cardiologist if:   · The veins in your neck look swollen or are bulging  · You have increased swelling in your legs or ankles  · Your heart beats faster than usual     · You feel your heart flutter often  · You have questions or concerns about your condition or care  Medicines: You may need any of the following:  · Medicines  may help decrease your cholesterol levels and your blood pressure  You may also be given medicine to help lower swelling  · Take your medicine as directed  Contact your healthcare provider if you think your medicine is not helping or if you have side effects  Tell him or her if you are allergic to any medicine  Keep a list of the medicines, vitamins, and herbs you take  Include the amounts, and when and why you take them  Bring the list or the pill bottles to follow-up visits   Carry your medicine list with you in case of an emergency  Manage aortic stenosis:   · Limit activities  Your healthcare provider may have you limit strenuous activity  Strenuous activity will make your heart work too hard  Ask your healthcare provider what activities are safe for you to do  · Eat heart-healthy foods  Heart-healthy foods include salmon, tuna, walnuts, whole-grain breads, low-fat dairy products, beans, and oils such as olive or canola oil  A dietitian or your provider can give you more information on meal plans such as the DASH (Dietary Approaches to Stop Hypertension) eating plan  The DASH plan is low in sodium, processed sugar, unhealthy fats, and total fat  It is high in potassium, calcium, and fiber  These can be found in vegetables, fruit, and whole-grain foods  · Limit sodium (salt) as directed  Too much sodium can affect your fluid balance  Check labels to find low-sodium or no-salt-added foods  You can also make small changes to get less salt  For example, if you add salt while you cook, do not add more salt at the table  Ask your healthcare provider or dietitian for more ways to cut down on salt  · Limit or do not drink alcohol  Ask your healthcare provider if it is okay for you to drink alcohol  Alcohol can increase your risk for high blood pressure and coronary artery disease  Your provider can tell you how many drinks are okay to have within 24 hours or within 1 week  A drink of alcohol is 12 ounces of beer, 5 ounces of wine, or 1½ ounces of liquor  · Maintain a healthy weight  Being overweight can increase your risk for high blood pressure and coronary artery disease  These conditions can make your symptoms worse  Ask your healthcare provider what a healthy weight is for you  Ask him or her to help you create a weight loss plan if you are overweight  · Talk to your healthcare provider about pregnancy  If you are a woman and want to get pregnant, talk to your healthcare provider   You and your baby may need to be monitored by specialists during your pregnancy  · Ask about vaccines you may need  Certain diseases are dangerous for a person who has aortic stenosis  Vaccines help prevent infections that can cause some diseases  Get a flu vaccine as soon as recommended each year, usually in September or October  Your healthcare provider can tell you if you also need other vaccines, and when to get them  Prevent aortic stenosis:   · Manage other health conditions  High blood pressure and high cholesterol levels increase your risk for aortic stenosis  Ask your healthcare provider for more information on managing these conditions  · Get treatment for strep throat  If strep throat is not treated, it can cause rheumatic fever  · Take care of your teeth and gums  Gingivitis, a gum disease, increases your risk for aortic stenosis  See your dental provider regularly to treat problems early  Follow up with your doctor or cardiologist as directed: You may need to return for more tests to check your heart over time  Write down your questions so you remember to ask them during your visits  © [employer] 2022 Information is for End User's use only and may not be sold, redistributed or otherwise used for commercial purposes  All illustrations and images included in CareNotes® are the copyrighted property of A D A M , Inc  or [address] [place] [address]  The above information is an  only  It is not intended as medical advice for individual conditions or treatments  Talk to your doctor, nurse or pharmacist before following any medical regimen to see if it is safe and effective for you  Chronic Kidney Disease   WHAT YOU NEED TO KNOW:   Chronic kidney disease (CKD) is the gradual and permanent loss of kidney function  It is also called chronic kidney failure, or chronic renal insufficiency  Normally, the kidneys remove fluid, chemicals, and waste from your blood   These wastes are turned into urine by your kidneys  CKD may worsen over time and lead to kidney failure  Your CKD team will help you and your family plan for your care at home  The team will help you create goals and find ways to meet your goals  Your care plan may change over time as your needs change  DISCHARGE INSTRUCTIONS:   Call your local emergency number (911 in the [address]) if:   · You have a seizure  · You have shortness of breath  Seek care immediately if:   · You are confused and very drowsy  Call your doctor or nephrologist if:   · You suddenly gain or lose more weight than your healthcare provider has told you is okay  · You have itchy skin or a rash  · You urinate more or less than you normally do  · You have blood in your urine  · You have nausea and are vomiting  · You have fatigue or muscle weakness  · You have hiccups that will not stop  · You have questions or concerns about your condition or care  Medicines:   · Medicines  may be given to decrease blood pressure and get rid of extra fluid  You may also receive medicine to manage health conditions that may occur with CKD, such as anemia, diabetes, and heart disease  · Take your medicine as directed  Contact your healthcare provider if you think your medicine is not helping or if you have side effects  Tell him or her if you are allergic to any medicine  Keep a list of the medicines, vitamins, and herbs you take  Include the amounts, and when and why you take them  Bring the list or the pill bottles to follow-up visits  Carry your medicine list with you in case of an emergency  What you can do to manage CKD: Management may include making some lifestyle changes  Tell your healthcare provider if you have any concerns about being able to make changes  He or she can help you find solutions, including working with specialists  Ask for help creating a plan to break large goals into smaller steps   Your plan may include any of the following:  · Manage other health conditions  Your healthcare provider will work with you to make a care plan that meets your needs  You will be checked regularly for heart disease or other conditions that can make CKD worse, such as diabetes  Your blood pressure will be closely monitored  You will also get a target blood pressure and help making a plan to reach your target  This may include taking your blood pressure at home  · Maintain a healthy weight  Your weight and body mass index (BMI) will be checked regularly  BMI helps find if your weight is healthy for your height  Your healthcare provider will use other tests to check your muscle and protein levels  Extra weight can strain your kidneys  A low weight or low muscle mass can make you feel more tired  You may have trouble doing your daily activities  Ask your provider what a healthy weight is for you  He or she can help you create a plan to lose or gain weight safely, if needed  The plan may include keeping a food diary  This is a list of foods and liquids you have each day  Your provider will use the diary to help you make changes, if needed  Changes are based on your health and any other conditions you have, such as diabetes  · Create an exercise plan  Regular exercise can help you manage CKD, high blood pressure, and diabetes  Exercise also helps control weight  Your provider can help you create exercise goals and a plan to reach those goals  For example, your goal may be to exercise for 30 minutes in a day  Your plan can include breaking exercise into 10 minute sessions, 3 times during the day  · Create a healthy eating plan  Your provider may tell you to eat food low in potassium, phosphorus, or protein  Your provider may also recommend vitamin or mineral supplements  Do not take any supplements without talking to your provider  A dietitian can help you plan meals if needed   Ask how much liquid to drink each day and which liquids are best for you  · Limit sodium (salt) as directed  You may need to limit sodium to less than 2,300 milligrams (mg) each day  Ask your dietitian or healthcare provider how much sodium you can have each day  The amount depends on your stage of kidney disease  Table salt, canned foods, soups, salted snacks, and processed meats, like deli meats and sausage, are high in sodium  Your provider or a dietitian can show you how to read food labels for sodium  · Limit alcohol as directed  Alcohol can cause fluid retention and can affect your kidneys  Ask how much alcohol is safe for you  A drink of alcohol is 12 ounces of beer, 5 ounces of wine, or 1½ ounces of liquor  · Do not smoke  Nicotine and other chemicals in cigarettes and cigars can cause kidney damage  Ask your provider for information if you currently smoke and need help to quit  E-cigarettes or smokeless tobacco still contain nicotine  Talk to your provider before you use these products  · Ask about over-the-counter medicines  Medicines such as NSAIDs and laxatives may harm your kidneys  Some cough and cold medicines can raise your blood pressure  Always ask if a medicine is safe before you take it  · Ask about vaccines you may need  CKD can increase your risk for infections such as pneumonia, influenza, and hepatitis  Vaccines lower your risk for infection  Your healthcare provider will tell you which vaccines you need and when to get them  Follow up with your doctor or nephrologist as directed: You will need to return for tests to monitor your kidney and nerve function, and your parathyroid hormone level  Your medicines may be changed, based on certain test results  Write down your questions so you remember to ask them during your visits  © Copyright [employer] 2022 Information is for End User's use only and may not be sold, redistributed or otherwise used for commercial purposes   All illustrations and images included in CareNotes® are the copyrighted property of [employer]  or Kerry Villarreal   The above information is an  only  It is not intended as medical advice for individual conditions or treatments  Talk to your doctor, nurse or pharmacist before following any medical regimen to see if it is safe and effective for you

## 2022-04-15 ENCOUNTER — TELEPHONE (OUTPATIENT)
Dept: NEPHROLOGY | Facility: CLINIC | Age: 77
End: 2022-04-15

## 2022-04-15 ENCOUNTER — APPOINTMENT (OUTPATIENT)
Dept: RADIOLOGY | Facility: HOSPITAL | Age: 77
End: 2022-04-15
Payer: MEDICARE

## 2022-04-15 VITALS
TEMPERATURE: 97.8 F | DIASTOLIC BLOOD PRESSURE: 52 MMHG | SYSTOLIC BLOOD PRESSURE: 135 MMHG | HEART RATE: 63 BPM | BODY MASS INDEX: 23.7 KG/M2 | RESPIRATION RATE: 18 BRPM | HEIGHT: 70 IN | OXYGEN SATURATION: 93 % | WEIGHT: 165.57 LBS

## 2022-04-15 DIAGNOSIS — N18.4 STAGE 4 CHRONIC KIDNEY DISEASE (HCC): Primary | ICD-10-CM

## 2022-04-15 LAB
ANION GAP SERPL CALCULATED.3IONS-SCNC: 4 MMOL/L (ref 4–13)
BUN SERPL-MCNC: 37 MG/DL (ref 5–25)
CALCIUM SERPL-MCNC: 8.8 MG/DL (ref 8.3–10.1)
CHLORIDE SERPL-SCNC: 108 MMOL/L (ref 100–108)
CO2 SERPL-SCNC: 30 MMOL/L (ref 21–32)
CREAT SERPL-MCNC: 2.19 MG/DL (ref 0.6–1.3)
GFR SERPL CREATININE-BSD FRML MDRD: 27 ML/MIN/1.73SQ M
GLUCOSE P FAST SERPL-MCNC: 108 MG/DL (ref 65–99)
GLUCOSE SERPL-MCNC: 108 MG/DL (ref 65–140)
POTASSIUM SERPL-SCNC: 3.6 MMOL/L (ref 3.5–5.3)
SODIUM SERPL-SCNC: 142 MMOL/L (ref 136–145)

## 2022-04-15 PROCEDURE — 80048 BASIC METABOLIC PNL TOTAL CA: CPT | Performed by: INTERNAL MEDICINE

## 2022-04-15 PROCEDURE — 99214 OFFICE O/P EST MOD 30 MIN: CPT | Performed by: INTERNAL MEDICINE

## 2022-04-15 PROCEDURE — NC001 PR NO CHARGE: Performed by: INTERNAL MEDICINE

## 2022-04-15 PROCEDURE — G1004 CDSM NDSC: HCPCS

## 2022-04-15 PROCEDURE — 75572 CT HRT W/3D IMAGE: CPT

## 2022-04-15 PROCEDURE — 74174 CTA ABD&PLVS W/CONTRAST: CPT

## 2022-04-15 RX ADMIN — ACETYLCYSTEINE 1200 MG: 200 SOLUTION ORAL; RESPIRATORY (INHALATION) at 09:22

## 2022-04-15 RX ADMIN — PANCRELIPASE 24000 UNITS: 24000; 76000; 120000 CAPSULE, DELAYED RELEASE PELLETS ORAL at 09:21

## 2022-04-15 RX ADMIN — CLOPIDOGREL BISULFATE 75 MG: 75 TABLET ORAL at 09:21

## 2022-04-15 RX ADMIN — ASPIRIN 81 MG: 81 TABLET, COATED ORAL at 09:21

## 2022-04-15 RX ADMIN — IODIXANOL 120 ML: 320 INJECTION, SOLUTION INTRAVASCULAR at 09:21

## 2022-04-15 RX ADMIN — PANTOPRAZOLE SODIUM 40 MG: 40 TABLET, DELAYED RELEASE ORAL at 06:18

## 2022-04-15 RX ADMIN — PANCRELIPASE 24000 UNITS: 24000; 76000; 120000 CAPSULE, DELAYED RELEASE PELLETS ORAL at 11:08

## 2022-04-15 RX ADMIN — SODIUM CHLORIDE 50 ML/HR: 0.9 INJECTION, SOLUTION INTRAVENOUS at 06:18

## 2022-04-15 RX ADMIN — AMLODIPINE BESYLATE 10 MG: 10 TABLET ORAL at 09:22

## 2022-04-15 NOTE — TELEPHONE ENCOUNTER
----- Message from Venus Mckeon MD sent at 4/15/2022 11:36 AM EDT -----  Patient to be dicahrged from B for CTA for TAVR, creat was 2 19, stable  Please repeat BMP next week      Thanks

## 2022-04-15 NOTE — PROGRESS NOTES
NEPHROLOGY PROGRESS NOTE   Clover Flower  76 y o  male MRN: [id number]  Unit/Bed#: -01 Encounter: [id number]  Reason for Consult: CKD, s/p contrast      SUMMARY:    75-year-old male with a history of chronic kidney disease stage IV, congestive heart failure, hypertension with severe aortic stenosis was planned for TAVR, requiring a CTA  Nephrology consult for reduction of contrast associated nephropathy  ASSESSMENT and PLAN:    Chronic Kidney Disease Stage IV  -- outpatient nephrologist: Dr Caio Otoole  -- baseline creatinine:  Prior baseline creatinine had been 1 6-1 8 mg/dL, but after recent hospitalization for volume overload and congestive heart failure his creatinine has more recently stabilized in mid 2's, likely representing a new baseline creatinine due to poor renal reserve and to keep euvolemic  -- Etiology:  Cardiorenal syndrome, hypertensive nephrosclerosis, arteriolar sclerosis, episodes of acute kidney injury  -- diuretic currently on hold  -- risks of contrast associated nephropathy were discussed at length with the patient and he understands them, which include dialysis dependent renal failure  -- given his underlying congestive heart failure and severe aortic stenosis and recent issues with volume overload cannot give a high degree of intravenous fluids as this could compromise his volume status  The goal should be euvolemia to help reduce the risk of contrast associated nephropathy    -- normal saline at 50 mL/hour 1 hour pre CTA and to continue 3 hours post CTA for total of 4 hours  --renal function stable with creatinine 2 1 mg/dL  -- Mucomyst 1200 mg twice a day x4 doses  --patient's approximate risk of contrast associated nephropathy is 57% with a risk of dialysis of 12 6%  --stable for in standpoint for discharge will message his primary nephrologist and plan for repeat BMP next week     Blood pressure/volume status  --history of hypertension  --recently had volume overload secondary to cardiorenal syndrome with a component of advanced chronic kidney disease  --volume status has improved  --takes torsemide at home which is currently on hold  --plan for gentle normal saline as detailed above  --can restart diuretic upon discharge       Anemia of chronic disease  --most recent hemoglobin stable at 9 6 and has improved     Severe aortic stenosis  --currently under process for TAVR  --status post CTA today April 15  --still requires a cardiac catheterization      SUBJECTIVE / INTERVAL HISTORY:    No chest pain or shortness of breath    OBJECTIVE:  Current Weight: Weight - Scale: 75 1 kg (165 lb 9 1 oz)  Vitals:    04/14/22 1540 04/15/22 0600 04/15/22 0747 04/15/22 0922   BP: (!) 116/36   135/52   Pulse: 55  63    Resp: 18      Temp: 97 5 °F (36 4 °C)  97 8 °F (36 6 °C)    TempSrc: Oral      SpO2: 97%  93%    Weight: 77 1 kg (170 lb) 75 1 kg (165 lb 9 1 oz)     Height: 5' 10" (1 778 m)          Intake/Output Summary (Last 24 hours) at 4/15/2022 1146  Last data filed at 4/14/2022 1246  Gross per 24 hour   Intake 234 ml   Output --   Net 234 ml       Review of Systems:    12 point ROS has been reviewed  Physical Exam  Vitals and nursing note reviewed  Constitutional:       General: He is not in acute distress  Appearance: He is well-developed  HENT:      Head: Normocephalic and atraumatic  Eyes:      General: No scleral icterus  Conjunctiva/sclera: Conjunctivae normal       Pupils: Pupils are equal, round, and reactive to light  Cardiovascular:      Rate and Rhythm: Normal rate and regular rhythm  Heart sounds: S1 normal and S2 normal  Murmur heard  No friction rub  No gallop  Pulmonary:      Effort: Pulmonary effort is normal  No respiratory distress  Breath sounds: Normal breath sounds  No wheezing or rales  Abdominal:      General: Bowel sounds are normal       Palpations: Abdomen is soft  Tenderness: There is no abdominal tenderness   There is no rebound  Musculoskeletal:         General: Normal range of motion  Cervical back: Normal range of motion and neck supple  Right lower leg: Edema present  Left lower leg: Edema present  Skin:     Findings: No rash  Neurological:      Mental Status: He is alert and oriented to person, place, and time  Psychiatric:         Behavior: Behavior normal          Medications:    Current Facility-Administered Medications:     acetylcysteine (MUCOMYST) 200 mg/mL oral solution 1,200 mg, 1,200 mg, Oral, BID, Drew Castro MD, 1,200 mg at 04/15/22 0922    amLODIPine (NORVASC) tablet 10 mg, 10 mg, Oral, Daily, KAVYA DukesNP, 10 mg at 04/15/22 8483    aspirin (ECOTRIN LOW STRENGTH) EC tablet 81 mg, 81 mg, Oral, Daily, KAVYA DukesNP, 81 mg at 04/15/22 4389    atorvastatin (LIPITOR) tablet 80 mg, 80 mg, Oral, HS, KAVYA DukesNP, 80 mg at 04/14/22 2107    clopidogrel (PLAVIX) tablet 75 mg, 75 mg, Oral, Daily, KAVYA DukesNP, 75 mg at 04/15/22 3436    pancrelipase (Lip-Prot-Amyl) (CREON) delayed release capsule 24,000 Units, 24,000 Units, Oral, TID With Meals, ZHANNA Dukes, 24,000 Units at 04/15/22 1108    pantoprazole (PROTONIX) EC tablet 40 mg, 40 mg, Oral, Early Morning, KAVYA DukesNP, 40 mg at 04/15/22 7875    Laboratory Results:  Results from last 7 days   Lab Units 04/15/22  0516 04/14/22  1109   POTASSIUM mmol/L 3 6 3 8   CHLORIDE mmol/L 108 104   CO2 mmol/L 30 29   BUN mg/dL 37* 38*   CREATININE mg/dL 2 19* 2 66*   CALCIUM mg/dL 8 8 9 0       PLEASE NOTE:  This encounter was completed utilizing the [employer] Direct Speech Voice Recognition Software  Grammatical errors, random word insertions, pronoun errors and incomplete sentences are occasional consequences of the system due to software limitations, ambient noise and hardware issues  These may be missed by proof reading prior to affixing electronic signature   Any questions or concerns about the content, text or information contained within the body of this dictation should be directly addressed to the physician for clarification  Please do not hesitate to call me directly if you have any any questions or concerns

## 2022-04-18 ENCOUNTER — LAB (OUTPATIENT)
Dept: LAB | Facility: MEDICAL CENTER | Age: 77
End: 2022-04-18
Payer: MEDICARE

## 2022-04-18 DIAGNOSIS — N18.32 STAGE 3B CHRONIC KIDNEY DISEASE (HCC): ICD-10-CM

## 2022-04-18 LAB
ANION GAP SERPL CALCULATED.3IONS-SCNC: 3 MMOL/L (ref 4–13)
BUN SERPL-MCNC: 36 MG/DL (ref 5–25)
CALCIUM SERPL-MCNC: 9 MG/DL (ref 8.3–10.1)
CHLORIDE SERPL-SCNC: 108 MMOL/L (ref 100–108)
CO2 SERPL-SCNC: 29 MMOL/L (ref 21–32)
CREAT SERPL-MCNC: 2.38 MG/DL (ref 0.6–1.3)
GFR SERPL CREATININE-BSD FRML MDRD: 24 ML/MIN/1.73SQ M
GLUCOSE P FAST SERPL-MCNC: 123 MG/DL (ref 65–99)
POTASSIUM SERPL-SCNC: 4 MMOL/L (ref 3.5–5.3)
SODIUM SERPL-SCNC: 140 MMOL/L (ref 136–145)

## 2022-04-18 PROCEDURE — 36415 COLL VENOUS BLD VENIPUNCTURE: CPT

## 2022-04-18 PROCEDURE — 80048 BASIC METABOLIC PNL TOTAL CA: CPT

## 2022-04-18 NOTE — RESULT ENCOUNTER NOTE
Please inform patient that renal function remains stable at creatinine 2 38 mg/dL with stable electrolytes  No evidence of contrast induced acute kidney injury, he received CTA chest abdomen pelvis during hospital stay on 04/15  Continue current treatment

## 2022-04-19 ENCOUNTER — TELEPHONE (OUTPATIENT)
Dept: NEPHROLOGY | Facility: CLINIC | Age: 77
End: 2022-04-19

## 2022-04-19 NOTE — TELEPHONE ENCOUNTER
----- Message from Anali Francisco MD sent at 4/18/2022  3:57 PM EDT -----  Please inform patient that renal function remains stable at creatinine 2 38 mg/dL with stable electrolytes  No evidence of contrast induced acute kidney injury, he received CTA chest abdomen pelvis during hospital stay on 04/15  Continue current treatment

## 2022-04-20 ENCOUNTER — PREP FOR PROCEDURE (OUTPATIENT)
Dept: CARDIOLOGY CLINIC | Facility: CLINIC | Age: 77
End: 2022-04-20

## 2022-04-20 ENCOUNTER — PATIENT OUTREACH (OUTPATIENT)
Dept: FAMILY MEDICINE CLINIC | Facility: CLINIC | Age: 77
End: 2022-04-20

## 2022-04-20 DIAGNOSIS — I35.0 NONRHEUMATIC AORTIC VALVE STENOSIS: Primary | ICD-10-CM

## 2022-04-20 NOTE — PROGRESS NOTES
Patient scheduled for TAVR 4/22/22  Left message on patients voicemail with my name, number and request for return call  Will follow via chart review until patient returns home post operatively

## 2022-04-21 ENCOUNTER — PREP FOR PROCEDURE (OUTPATIENT)
Dept: CARDIOLOGY CLINIC | Facility: CLINIC | Age: 77
End: 2022-04-21

## 2022-04-21 ENCOUNTER — TELEPHONE (OUTPATIENT)
Dept: CARDIOLOGY CLINIC | Facility: CLINIC | Age: 77
End: 2022-04-21

## 2022-04-21 DIAGNOSIS — I35.0 NONRHEUMATIC AORTIC VALVE STENOSIS: Primary | ICD-10-CM

## 2022-04-21 NOTE — TELEPHONE ENCOUNTER
Patient scheduled for [facility] on 5/5/22 in \[facility] [place] [facility]\"" with Dr Mook Molina  Mailing patient instructions  Medication hold Torsemide  Per patient, Medicare as primary insurance

## 2022-04-28 ENCOUNTER — TELEPHONE (OUTPATIENT)
Dept: NEPHROLOGY | Facility: CLINIC | Age: 77
End: 2022-04-28

## 2022-04-28 ENCOUNTER — PATIENT OUTREACH (OUTPATIENT)
Dept: FAMILY MEDICINE CLINIC | Facility: CLINIC | Age: 77
End: 2022-04-28

## 2022-04-28 ENCOUNTER — OFFICE VISIT (OUTPATIENT)
Dept: URGENT CARE | Facility: MEDICAL CENTER | Age: 77
End: 2022-04-28
Payer: MEDICARE

## 2022-04-28 ENCOUNTER — TELEPHONE (OUTPATIENT)
Dept: FAMILY MEDICINE CLINIC | Facility: CLINIC | Age: 77
End: 2022-04-28

## 2022-04-28 VITALS
OXYGEN SATURATION: 97 % | RESPIRATION RATE: 18 BRPM | HEART RATE: 70 BPM | BODY MASS INDEX: 23.94 KG/M2 | WEIGHT: 171 LBS | HEIGHT: 71 IN | SYSTOLIC BLOOD PRESSURE: 130 MMHG | DIASTOLIC BLOOD PRESSURE: 56 MMHG | TEMPERATURE: 97.2 F

## 2022-04-28 DIAGNOSIS — M10.9 ACUTE GOUT INVOLVING TOE OF RIGHT FOOT, UNSPECIFIED CAUSE: Primary | ICD-10-CM

## 2022-04-28 DIAGNOSIS — N18.4 STAGE 4 CHRONIC KIDNEY DISEASE (HCC): ICD-10-CM

## 2022-04-28 DIAGNOSIS — M10.071 ACUTE IDIOPATHIC GOUT INVOLVING TOE OF RIGHT FOOT: Primary | ICD-10-CM

## 2022-04-28 PROCEDURE — G0463 HOSPITAL OUTPT CLINIC VISIT: HCPCS | Performed by: PHYSICIAN ASSISTANT

## 2022-04-28 PROCEDURE — 99212 OFFICE O/P EST SF 10 MIN: CPT | Performed by: PHYSICIAN ASSISTANT

## 2022-04-28 RX ORDER — PREDNISONE 20 MG/1
20 TABLET ORAL 2 TIMES DAILY WITH MEALS
Qty: 10 TABLET | Refills: 0 | Status: SHIPPED | OUTPATIENT
Start: 2022-04-28 | End: 2022-05-03

## 2022-04-28 NOTE — TELEPHONE ENCOUNTER
Discussed with patient, patient went to urgent care and was started on prednisone  He mentions he is going for blood work next week, will also check uric acid level and if elevated consider allopurinol  Also discussed about risk of contrast nephropathy with patient being plan for cardiac catheterization on 05/05  Discussed about holding diuretics on the day of cardiac catheterization and also patient will need IV hydration as per my office note

## 2022-04-28 NOTE — PATIENT INSTRUCTIONS
1  Tylenol as needed for pain  2  Take prednisone 20mg  Twice daily x 5 days  3  Recommend follow-up with Podiatry as scheduled   4   ER if symptoms worsen

## 2022-04-28 NOTE — TELEPHONE ENCOUNTER
Patient called he believes he is having a gout flair  He was looking for an apt and we have nothing available he is seeing his podiatrist tomorrow, I did direct him to seek urgent care from [facility] now for today, and he agreed

## 2022-04-28 NOTE — PROGRESS NOTES
[facility]        NAME: Gopi Angela  is a 76 y o  male  : 1945    MRN: [id number]  DATE: 2022  TIME: 11:37 AM    Assessment and Plan   Acute gout involving toe of right foot, unspecified cause [M10 9]  1  Acute gout involving toe of right foot, unspecified cause  predniSONE 20 mg tablet         Patient Instructions     1  Tylenol as needed for pain  2  Take prednisone 20mg  Twice daily x 5 days  3  Recommend follow-up with Podiatry as scheduled   4  ER if symptoms worsen    Chief Complaint     Chief Complaint   Patient presents with    Gout     Right big toe         History of Present Illness       Jayda Lopez is a 17-year-old male presents with pain and swelling of his right great toe that started last evening  Patient reports no fall or trauma  He does have a prior history of gout generally in the same toe  Patient reports he does have a consult scheduled with podiatry on 2022 but his pain is worsened over the past 24 hours  Patient currently rates his pain is 8/10  Review of Systems   Review of Systems   Constitutional: Negative  Respiratory: Negative  Cardiovascular: Negative  Gastrointestinal: Negative  Musculoskeletal: Positive for joint swelling           Current Medications       Current Outpatient Medications:     amLODIPine (NORVASC) 10 mg tablet, TAKE 1 TABLET DAILY, Disp: 90 tablet, Rfl: 3    AMYLASE-LIPASE-PROTEASE PO, Take by mouth , Disp: , Rfl:     aspirin (ECOTRIN LOW STRENGTH) 81 mg EC tablet, Take 81 mg by mouth daily, Disp: , Rfl:     atorvastatin (LIPITOR) 80 mg tablet, TAKE 1 TABLET DAILY AT     BEDTIME, Disp: 90 tablet, Rfl: 3    clopidogrel (PLAVIX) 75 mg tablet, TAKE 1 TABLET DAILY, Disp: 90 tablet, Rfl: 3    Magnesium Oxide (MAG-200 PO), Take by mouth once a week , Disp: , Rfl:     Multiple Vitamin (MULTIVITAMIN) tablet, Take 1 tablet by mouth daily 1-2 times a week , Disp: , Rfl:     pantoprazole (PROTONIX) 40 mg tablet, Take 1 tablet (40 mg total) by mouth daily, Disp: 90 tablet, Rfl: 1    torsemide (DEMADEX) 20 mg tablet, Take 1 tablet (20 mg total) by mouth 2 (two) times a day, Disp: 180 tablet, Rfl: 3    Cyanocobalamin (VITAMIN B12 PO), Take by mouth once a week  (Patient not taking: Reported on 4/28/2022 ), Disp: , Rfl:     predniSONE 20 mg tablet, Take 1 tablet (20 mg total) by mouth 2 (two) times a day with meals for 5 days, Disp: 10 tablet, Rfl: 0    Current Allergies     Allergies as of 04/28/2022    (No Known Allergies)            The following portions of the patient's history were reviewed and updated as appropriate: allergies, current medications, past family history, past medical history, past social history, past surgical history and problem list      Past Medical History:   Diagnosis Date    CAD (coronary artery disease)     Carotid stenosis, asymptomatic, bilateral     Chronic kidney disease     Diabetes ([facility] [address] )     type 2, non-insulin dependent    GERD (gastroesophageal reflux disease)     History of nephrolithiasis     Hyperlipidemia     Hypertension     PAD (peripheral artery disease) ([facility] [address] )     Severe aortic stenosis        Past Surgical History:   Procedure Laterality Date    APPENDECTOMY      COLECTOMY      COLONOSCOPY  2013    CORONARY ARTERY BYPASS GRAFT  2013    X 2    FEMORAL ARTERY - POPLITEAL ARTERY BYPASS GRAFT      HEMORRHOID SURGERY      IR AORTAGRAM WITH RUN-OFF  11/19/2018    OR SLCTV CATHJ 3RD+ ORD SLCTV ABDL PEL/LXTR [facility] Left 8/12/2016    Procedure: LEFT FEMORAL ARTERIOGRAM; BALLOON ANGIOPLASTY; SFA  AND FEMORAL AT VEIN GRAFT;  Surgeon: Jose Mc MD;  Location: [facility] MAIN OR;  Service: Vascular    TONSILLECTOMY AND ADENOIDECTOMY         Family History   Problem Relation Age of Onset    Heart attack Father     Other Sister         bypass and vlave replacement    Stroke Paternal Uncle     Arrhythmia Neg Hx     Asthma Neg Hx     Clotting disorder Neg Hx     Fainting Neg Hx     Anuerysm Neg Hx     Hypertension Neg Hx         unsure     Hyperlipidemia Neg Hx     Heart failure Neg Hx          Medications have been verified  Objective   /56   Pulse 70   Temp (!) 97 2 °F (36 2 °C)   Resp 18   Ht 5' 10 5" (1 791 m)   Wt 77 6 kg (171 lb)   SpO2 97%   BMI 24 19 kg/m²   No LMP for male patient  Physical Exam     Physical Exam  Constitutional:       General: He is not in acute distress  Appearance: Normal appearance  He is not ill-appearing  Cardiovascular:      Rate and Rhythm: Normal rate and regular rhythm  Heart sounds: Normal heart sounds  No murmur heard  Pulmonary:      Effort: Pulmonary effort is normal       Breath sounds: Normal breath sounds  Feet:      Comments: Diffuse tenderness to palpation could, marked erythema and swelling over the MP joint of the right great toe  No warmth to touch  Neurological:      Mental Status: He is alert

## 2022-04-28 NOTE — TELEPHONE ENCOUNTER
Patient c/o gout flare, big toe  Very painful  Podiatrist can't see him until tomorrow for injection  Asking if anything he can do in the interim?

## 2022-04-28 NOTE — PROGRESS NOTES
Pt was seen in Urgent Care today at [facility] for complaints of pain in right toe  As per patient he was advised to take Tylenol as needed and start Prednisone  He has picked up prescriptions and started both today  Pt is scheduled to see Podiatry tomorrow  He is scheduled for a cardiac cath 5/5/22  Weight is 171lbs  Following low sodium diet  Denies shortness of breath at this time  Will continue to follow  Denies further questions or concerns at this time

## 2022-05-02 ENCOUNTER — LAB (OUTPATIENT)
Dept: LAB | Facility: MEDICAL CENTER | Age: 77
End: 2022-05-02
Payer: MEDICARE

## 2022-05-02 DIAGNOSIS — E79.0 HYPERURICEMIA: ICD-10-CM

## 2022-05-02 DIAGNOSIS — R79.89 ELEVATED SERUM CREATININE: ICD-10-CM

## 2022-05-02 DIAGNOSIS — N18.4 ANEMIA DUE TO STAGE 4 CHRONIC KIDNEY DISEASE (HCC): ICD-10-CM

## 2022-05-02 DIAGNOSIS — E83.42 HYPOMAGNESEMIA: ICD-10-CM

## 2022-05-02 DIAGNOSIS — N18.4 STAGE 4 CHRONIC KIDNEY DISEASE (HCC): Primary | ICD-10-CM

## 2022-05-02 DIAGNOSIS — N18.32 STAGE 3B CHRONIC KIDNEY DISEASE (HCC): ICD-10-CM

## 2022-05-02 DIAGNOSIS — D72.819 LEUKOPENIA, UNSPECIFIED TYPE: ICD-10-CM

## 2022-05-02 DIAGNOSIS — R80.1 PERSISTENT PROTEINURIA, UNSPECIFIED: ICD-10-CM

## 2022-05-02 DIAGNOSIS — I35.0 NONRHEUMATIC AORTIC VALVE STENOSIS: ICD-10-CM

## 2022-05-02 DIAGNOSIS — N18.4 STAGE 4 CHRONIC KIDNEY DISEASE (HCC): ICD-10-CM

## 2022-05-02 DIAGNOSIS — E11.22 CONTROLLED TYPE 2 DIABETES MELLITUS WITH STAGE 3 CHRONIC KIDNEY DISEASE, WITHOUT LONG-TERM CURRENT USE OF INSULIN (HCC): ICD-10-CM

## 2022-05-02 DIAGNOSIS — I12.9 BENIGN HYPERTENSION WITH CHRONIC KIDNEY DISEASE, STAGE III (HCC): ICD-10-CM

## 2022-05-02 DIAGNOSIS — M10.071 ACUTE IDIOPATHIC GOUT INVOLVING TOE OF RIGHT FOOT: ICD-10-CM

## 2022-05-02 DIAGNOSIS — N18.30 BENIGN HYPERTENSION WITH CHRONIC KIDNEY DISEASE, STAGE III (HCC): ICD-10-CM

## 2022-05-02 DIAGNOSIS — N18.30 CONTROLLED TYPE 2 DIABETES MELLITUS WITH STAGE 3 CHRONIC KIDNEY DISEASE, WITHOUT LONG-TERM CURRENT USE OF INSULIN (HCC): ICD-10-CM

## 2022-05-02 DIAGNOSIS — D63.1 ANEMIA DUE TO STAGE 4 CHRONIC KIDNEY DISEASE (HCC): ICD-10-CM

## 2022-05-02 LAB
ALBUMIN SERPL BCP-MCNC: 3.7 G/DL (ref 3.5–5)
ALP SERPL-CCNC: 58 U/L (ref 46–116)
ALT SERPL W P-5'-P-CCNC: 30 U/L (ref 12–78)
ANION GAP SERPL CALCULATED.3IONS-SCNC: 3 MMOL/L (ref 4–13)
AST SERPL W P-5'-P-CCNC: 16 U/L (ref 5–45)
BACTERIA UR QL AUTO: ABNORMAL /HPF
BASOPHILS # BLD AUTO: 0 THOUSANDS/ΜL (ref 0–0.1)
BASOPHILS NFR BLD AUTO: 0 % (ref 0–1)
BILIRUB SERPL-MCNC: 0.43 MG/DL (ref 0.2–1)
BILIRUB UR QL STRIP: NEGATIVE
BUN SERPL-MCNC: 53 MG/DL (ref 5–25)
CALCIUM SERPL-MCNC: 9.6 MG/DL (ref 8.3–10.1)
CHLORIDE SERPL-SCNC: 105 MMOL/L (ref 100–108)
CLARITY UR: CLEAR
CO2 SERPL-SCNC: 31 MMOL/L (ref 21–32)
COLOR UR: ABNORMAL
CREAT SERPL-MCNC: 2.3 MG/DL (ref 0.6–1.3)
CREAT UR-MCNC: 74.4 MG/DL
EOSINOPHIL # BLD AUTO: 0 THOUSAND/ΜL (ref 0–0.61)
EOSINOPHIL NFR BLD AUTO: 0 % (ref 0–6)
ERYTHROCYTE [DISTWIDTH] IN BLOOD BY AUTOMATED COUNT: 15.8 % (ref 11.6–15.1)
GFR SERPL CREATININE-BSD FRML MDRD: 26 ML/MIN/1.73SQ M
GLUCOSE P FAST SERPL-MCNC: 122 MG/DL (ref 65–99)
GLUCOSE UR STRIP-MCNC: NEGATIVE MG/DL
HCT VFR BLD AUTO: 35.1 % (ref 36.5–49.3)
HGB BLD-MCNC: 11.2 G/DL (ref 12–17)
HGB UR QL STRIP.AUTO: NEGATIVE
HYALINE CASTS #/AREA URNS LPF: ABNORMAL /LPF
IMM GRANULOCYTES # BLD AUTO: 0.02 THOUSAND/UL (ref 0–0.2)
IMM GRANULOCYTES NFR BLD AUTO: 0 % (ref 0–2)
KETONES UR STRIP-MCNC: NEGATIVE MG/DL
LEUKOCYTE ESTERASE UR QL STRIP: NEGATIVE
LYMPHOCYTES # BLD AUTO: 1.06 THOUSANDS/ΜL (ref 0.6–4.47)
LYMPHOCYTES NFR BLD AUTO: 20 % (ref 14–44)
MAGNESIUM SERPL-MCNC: 2.2 MG/DL (ref 1.6–2.6)
MCH RBC QN AUTO: 28.3 PG (ref 26.8–34.3)
MCHC RBC AUTO-ENTMCNC: 31.9 G/DL (ref 31.4–37.4)
MCV RBC AUTO: 89 FL (ref 82–98)
MONOCYTES # BLD AUTO: 0.52 THOUSAND/ΜL (ref 0.17–1.22)
MONOCYTES NFR BLD AUTO: 10 % (ref 4–12)
NEUTROPHILS # BLD AUTO: 3.74 THOUSANDS/ΜL (ref 1.85–7.62)
NEUTS SEG NFR BLD AUTO: 70 % (ref 43–75)
NITRITE UR QL STRIP: NEGATIVE
NON-SQ EPI CELLS URNS QL MICRO: ABNORMAL /HPF
NRBC BLD AUTO-RTO: 0 /100 WBCS
PH UR STRIP.AUTO: 6 [PH]
PHOSPHATE SERPL-MCNC: 3.6 MG/DL (ref 2.3–4.1)
PLATELET # BLD AUTO: 246 THOUSANDS/UL (ref 149–390)
PMV BLD AUTO: 10.8 FL (ref 8.9–12.7)
POTASSIUM SERPL-SCNC: 3.8 MMOL/L (ref 3.5–5.3)
PROT SERPL-MCNC: 7 G/DL (ref 6.4–8.2)
PROT UR STRIP-MCNC: ABNORMAL MG/DL
PROT UR-MCNC: 34 MG/DL
PROT/CREAT UR: 0.46 MG/G{CREAT} (ref 0–0.1)
PTH-INTACT SERPL-MCNC: 112.1 PG/ML (ref 18.4–80.1)
RBC # BLD AUTO: 3.96 MILLION/UL (ref 3.88–5.62)
RBC #/AREA URNS AUTO: ABNORMAL /HPF
SODIUM SERPL-SCNC: 139 MMOL/L (ref 136–145)
SP GR UR STRIP.AUTO: 1.01 (ref 1–1.03)
URATE SERPL-MCNC: 10.4 MG/DL (ref 4.2–8)
UROBILINOGEN UR STRIP-ACNC: <2 MG/DL
WBC # BLD AUTO: 5.34 THOUSAND/UL (ref 4.31–10.16)
WBC #/AREA URNS AUTO: ABNORMAL /HPF

## 2022-05-02 PROCEDURE — 84100 ASSAY OF PHOSPHORUS: CPT

## 2022-05-02 PROCEDURE — 84550 ASSAY OF BLOOD/URIC ACID: CPT

## 2022-05-02 PROCEDURE — 80053 COMPREHEN METABOLIC PANEL: CPT

## 2022-05-02 PROCEDURE — 83970 ASSAY OF PARATHORMONE: CPT

## 2022-05-02 PROCEDURE — 85025 COMPLETE CBC W/AUTO DIFF WBC: CPT

## 2022-05-02 PROCEDURE — 82570 ASSAY OF URINE CREATININE: CPT

## 2022-05-02 PROCEDURE — 83735 ASSAY OF MAGNESIUM: CPT

## 2022-05-02 PROCEDURE — 36415 COLL VENOUS BLD VENIPUNCTURE: CPT

## 2022-05-02 PROCEDURE — 81001 URINALYSIS AUTO W/SCOPE: CPT

## 2022-05-02 PROCEDURE — 84156 ASSAY OF PROTEIN URINE: CPT

## 2022-05-02 RX ORDER — ALLOPURINOL 100 MG/1
100 TABLET ORAL DAILY
Qty: 90 TABLET | Refills: 3 | Status: SHIPPED | OUTPATIENT
Start: 2022-05-02 | End: 2022-05-27 | Stop reason: SDUPTHER

## 2022-05-02 NOTE — RESULT ENCOUNTER NOTE
Please inform patient that renal function is stable at creatinine 2 3, most likely his new baseline creatinine is around 2 3    Uric acid level is elevated at 10 4  Starting on allopurinol 100 mg daily as he had recent gout attack  Will check BMP in 10 days as patient plan for cardiac catheterization and need to monitor renal function post cardiac catheterization  Will check CMP and CBC before the office visit in June to monitor liver function along with the renal function   All orders placed

## 2022-05-03 ENCOUNTER — TELEPHONE (OUTPATIENT)
Dept: NEPHROLOGY | Facility: CLINIC | Age: 77
End: 2022-05-03

## 2022-05-03 NOTE — TELEPHONE ENCOUNTER
----- Message from Rosa Peña MD sent at 5/2/2022  4:54 PM EDT -----  Please inform patient that renal function is stable at creatinine 2 3, most likely his new baseline creatinine is around 2 3    Uric acid level is elevated at 10 4  Starting on allopurinol 100 mg daily as he had recent gout attack  Will check BMP in 10 days as patient plan for cardiac catheterization and need to monitor renal function post cardiac catheterization  Will check CMP and CBC before the office visit in June to monitor liver function along with the renal function   All orders placed

## 2022-05-03 NOTE — TELEPHONE ENCOUNTER
Spoke with patient and advised:  Please inform patient that renal function is stable at creatinine 2 3, most likely his new baseline creatinine is around 2 3     Uric acid level is elevated at 10  4   Starting on allopurinol 100 mg daily as he had recent gout attack   Will check BMP in 10 days as patient plan for cardiac catheterization and need to monitor renal function post cardiac catheterization   Will check CMP and CBC before the office visit in June to monitor liver function along with the renal function  All orders placed   Patient stated he will start the allopurinol after his cardiac catheter placement  Patient will discuss with Dr Camron Stone about his medication at his next visit  Patient would like to know if he will be on allopurinol for life

## 2022-05-04 NOTE — PROGRESS NOTES
Pt called Short stay center r/t questions about torsemide and tomorrow's procedure  Pt instructed to take this afternoon's dose of torsemide and hold tomorrow a m 's dose  Location, npo status, and Covid symptom free status verified  Arrival time of 0700 verified

## 2022-05-05 ENCOUNTER — HOSPITAL ENCOUNTER (OUTPATIENT)
Facility: HOSPITAL | Age: 77
Setting detail: OUTPATIENT SURGERY
Discharge: HOME | End: 2022-05-05
Attending: INTERNAL MEDICINE | Admitting: INTERNAL MEDICINE
Payer: MEDICARE

## 2022-05-05 VITALS
OXYGEN SATURATION: 97 % | DIASTOLIC BLOOD PRESSURE: 52 MMHG | HEART RATE: 59 BPM | TEMPERATURE: 97.4 F | HEIGHT: 70 IN | RESPIRATION RATE: 17 BRPM | BODY MASS INDEX: 23.13 KG/M2 | SYSTOLIC BLOOD PRESSURE: 150 MMHG | WEIGHT: 161.6 LBS

## 2022-05-05 DIAGNOSIS — I35.0 NONRHEUMATIC AORTIC VALVE STENOSIS: ICD-10-CM

## 2022-05-05 DIAGNOSIS — N18.4 STAGE 4 CHRONIC KIDNEY DISEASE (HCC): Primary | ICD-10-CM

## 2022-05-05 DIAGNOSIS — N18.4 CKD (CHRONIC KIDNEY DISEASE) STAGE 4, GFR 15-29 ML/MIN (HCC): ICD-10-CM

## 2022-05-05 LAB
ANION GAP SERPL CALCULATED.3IONS-SCNC: 5 MMOL/L (ref 4–13)
ATRIAL RATE: 59 BPM
BUN SERPL-MCNC: 50 MG/DL (ref 5–25)
CALCIUM SERPL-MCNC: 8.7 MG/DL (ref 8.3–10.1)
CHLORIDE SERPL-SCNC: 104 MMOL/L (ref 100–108)
CHOLEST SERPL-MCNC: 95 MG/DL
CO2 SERPL-SCNC: 32 MMOL/L (ref 21–32)
CREAT SERPL-MCNC: 2.42 MG/DL (ref 0.6–1.3)
GFR SERPL CREATININE-BSD FRML MDRD: 24 ML/MIN/1.73SQ M
GLUCOSE P FAST SERPL-MCNC: 132 MG/DL (ref 65–99)
GLUCOSE SERPL-MCNC: 132 MG/DL (ref 65–140)
HDLC SERPL-MCNC: 52 MG/DL
LDLC SERPL CALC-MCNC: 33 MG/DL (ref 0–100)
LDLC SERPL DIRECT ASSAY-MCNC: 40 MG/DL (ref 0–100)
NONHDLC SERPL-MCNC: 43 MG/DL
P AXIS: 75 DEGREES
POTASSIUM SERPL-SCNC: 3.8 MMOL/L (ref 3.5–5.3)
PR INTERVAL: 218 MS
QRS AXIS: 63 DEGREES
QRSD INTERVAL: 118 MS
QT INTERVAL: 450 MS
QTC INTERVAL: 445 MS
SODIUM SERPL-SCNC: 141 MMOL/L (ref 136–145)
T WAVE AXIS: 215 DEGREES
TRIGL SERPL-MCNC: 50 MG/DL
VENTRICULAR RATE: 59 BPM

## 2022-05-05 PROCEDURE — 80048 BASIC METABOLIC PNL TOTAL CA: CPT | Performed by: INTERNAL MEDICINE

## 2022-05-05 PROCEDURE — 99152 MOD SED SAME PHYS/QHP 5/>YRS: CPT | Performed by: INTERNAL MEDICINE

## 2022-05-05 PROCEDURE — C1894 INTRO/SHEATH, NON-LASER: HCPCS | Performed by: INTERNAL MEDICINE

## 2022-05-05 PROCEDURE — 93005 ELECTROCARDIOGRAM TRACING: CPT

## 2022-05-05 PROCEDURE — C1769 GUIDE WIRE: HCPCS | Performed by: INTERNAL MEDICINE

## 2022-05-05 PROCEDURE — C1760 CLOSURE DEV, VASC: HCPCS | Performed by: INTERNAL MEDICINE

## 2022-05-05 PROCEDURE — 93010 ELECTROCARDIOGRAM REPORT: CPT | Performed by: INTERNAL MEDICINE

## 2022-05-05 PROCEDURE — NC001 PR NO CHARGE: Performed by: INTERNAL MEDICINE

## 2022-05-05 PROCEDURE — 93455 CORONARY ART/GRFT ANGIO S&I: CPT | Performed by: INTERNAL MEDICINE

## 2022-05-05 PROCEDURE — 80061 LIPID PANEL: CPT | Performed by: STUDENT IN AN ORGANIZED HEALTH CARE EDUCATION/TRAINING PROGRAM

## 2022-05-05 PROCEDURE — 83721 ASSAY OF BLOOD LIPOPROTEIN: CPT | Performed by: STUDENT IN AN ORGANIZED HEALTH CARE EDUCATION/TRAINING PROGRAM

## 2022-05-05 PROCEDURE — 99153 MOD SED SAME PHYS/QHP EA: CPT | Performed by: INTERNAL MEDICINE

## 2022-05-05 PROCEDURE — 99214 OFFICE O/P EST MOD 30 MIN: CPT | Performed by: INTERNAL MEDICINE

## 2022-05-05 DEVICE — PERCLOSE™ PROSTYLE™ SUTURE-MEDIATED CLOSURE AND REPAIR SYSTEM
Type: IMPLANTABLE DEVICE | Site: GROIN | Status: FUNCTIONAL
Brand: PERCLOSE™ PROSTYLE™

## 2022-05-05 RX ORDER — SODIUM CHLORIDE 9 MG/ML
50 INJECTION, SOLUTION INTRAVENOUS CONTINUOUS
Status: DISPENSED | OUTPATIENT
Start: 2022-05-05 | End: 2022-05-05

## 2022-05-05 RX ORDER — METOPROLOL TARTRATE 5 MG/5ML
INJECTION INTRAVENOUS AS NEEDED
Status: DISCONTINUED | OUTPATIENT
Start: 2022-05-05 | End: 2022-05-05 | Stop reason: HOSPADM

## 2022-05-05 RX ORDER — ONDANSETRON 2 MG/ML
4 INJECTION INTRAMUSCULAR; INTRAVENOUS EVERY 6 HOURS PRN
Status: DISCONTINUED | OUTPATIENT
Start: 2022-05-05 | End: 2022-05-05 | Stop reason: HOSPADM

## 2022-05-05 RX ORDER — ACETAMINOPHEN 325 MG/1
650 TABLET ORAL EVERY 4 HOURS PRN
Status: DISCONTINUED | OUTPATIENT
Start: 2022-05-05 | End: 2022-05-05 | Stop reason: HOSPADM

## 2022-05-05 RX ORDER — LIDOCAINE HYDROCHLORIDE 10 MG/ML
INJECTION, SOLUTION EPIDURAL; INFILTRATION; INTRACAUDAL; PERINEURAL AS NEEDED
Status: DISCONTINUED | OUTPATIENT
Start: 2022-05-05 | End: 2022-05-05 | Stop reason: HOSPADM

## 2022-05-05 RX ORDER — ASPIRIN 81 MG/1
324 TABLET, CHEWABLE ORAL ONCE
Status: COMPLETED | OUTPATIENT
Start: 2022-05-05 | End: 2022-05-05

## 2022-05-05 RX ORDER — NITROGLYCERIN 0.4 MG/1
0.4 TABLET SUBLINGUAL
Status: DISCONTINUED | OUTPATIENT
Start: 2022-05-05 | End: 2022-05-05 | Stop reason: HOSPADM

## 2022-05-05 RX ORDER — FENTANYL CITRATE 50 UG/ML
INJECTION, SOLUTION INTRAMUSCULAR; INTRAVENOUS AS NEEDED
Status: DISCONTINUED | OUTPATIENT
Start: 2022-05-05 | End: 2022-05-05 | Stop reason: HOSPADM

## 2022-05-05 RX ORDER — SODIUM CHLORIDE 9 MG/ML
75 INJECTION, SOLUTION INTRAVENOUS CONTINUOUS
Status: DISCONTINUED | OUTPATIENT
Start: 2022-05-05 | End: 2022-05-05 | Stop reason: HOSPADM

## 2022-05-05 RX ORDER — MIDAZOLAM HYDROCHLORIDE 2 MG/2ML
INJECTION, SOLUTION INTRAMUSCULAR; INTRAVENOUS AS NEEDED
Status: DISCONTINUED | OUTPATIENT
Start: 2022-05-05 | End: 2022-05-05 | Stop reason: HOSPADM

## 2022-05-05 RX ORDER — LABETALOL HYDROCHLORIDE 5 MG/ML
10 INJECTION, SOLUTION INTRAVENOUS
Status: DISCONTINUED | OUTPATIENT
Start: 2022-05-05 | End: 2022-05-05 | Stop reason: HOSPADM

## 2022-05-05 RX ORDER — AMLODIPINE BESYLATE 5 MG/1
10 TABLET ORAL DAILY
Status: DISCONTINUED | OUTPATIENT
Start: 2022-05-05 | End: 2022-05-05

## 2022-05-05 RX ADMIN — PANCRELIPASE 24000 UNITS: 24000; 76000; 120000 CAPSULE, DELAYED RELEASE PELLETS ORAL at 14:22

## 2022-05-05 RX ADMIN — ASPIRIN 81 MG CHEWABLE TABLET 243 MG: 81 TABLET CHEWABLE at 07:34

## 2022-05-05 RX ADMIN — SODIUM CHLORIDE 232.8 ML: 0.9 INJECTION, SOLUTION INTRAVENOUS at 07:30

## 2022-05-05 NOTE — CONSULTS
Consultation - Nephrology   Terrie Seaman  76 y o  male MRN: [id number]  Unit/Bed#: [facility] CATH LAB ROOM Encounter: [id number]    ASSESSMENT/PLAN:   1  CKD stage 4:  Baseline creatinine mid 2's since March and follows with Dr Allison Mao  Previously baseline creatinine 1 6-1 8 but was hospitalized in March with CHF/volume overload and since then creatinine has been higher to keep euvloemic   · Creatinine today 2 4 which seems to be his new baseline  · Discussed with patient the risk of contrast associated nephropathy  · Hold torsemide  · Currently on normal saline at 50 cc/hour and would continue this for about 4 hours post cath  · If discharged would repeat BMP in about 48 hours, if admitted repeat BMP tomorrow morning  2  Severe aortic stenosis:  For cardiac catheterization today as part of TAVR workup  3  Hypertension: on amlodipine and torsemide at home   4  Chronic diastolic CHF: torsemide on hold  Monitor on gentle IVF   5  Gout:  Recently on steroids but when they stopped his gout flared again  Will be starting allopurinol at discharge    HISTORY OF PRESENT ILLNESS:  Requesting Physician: Delisa Hewitt DO  Reason for Consult: CKD pre cardiac cath     Terrie Seaman  is a 76y o  year old male who was admitted to [facility] for elective cardiac catheterization  Patient has history of severe aortic stenosis and is being worked up for TAVR so he presents today for cardiac catheterization as part of the workup  He has a history of CKD so Nephrology was consulted        PAST MEDICAL HISTORY:  Past Medical History:   Diagnosis Date    CAD (coronary artery disease)     Carotid stenosis, asymptomatic, bilateral     Chronic kidney disease     Diabetes ([facility] [address] )     type 2, non-insulin dependent    GERD (gastroesophageal reflux disease)     History of nephrolithiasis     Hyperlipidemia     Hypertension     [facility]) [facility])     Severe aortic stenosis        PAST SURGICAL HISTORY:  Past Surgical History:   Procedure Laterality Date    APPENDECTOMY      COLECTOMY      COLONOSCOPY  2013    CORONARY ARTERY BYPASS GRAFT  2013    X 2    FEMORAL ARTERY - POPLITEAL ARTERY BYPASS GRAFT      HEMORRHOID SURGERY      IR AORTAGRAM WITH RUN-OFF  11/19/2018    [place] SLCTV CATHJ 3RD+ ORD SLCTV ABDL PEL/LXTR [facility] Left 8/12/2016    Procedure: LEFT FEMORAL ARTERIOGRAM; BALLOON ANGIOPLASTY; SFA  AND FEMORAL AT VEIN GRAFT;  Surgeon: Harrison Frausto MD;  Location: [facility] MAIN OR;  Service: Vascular    TONSILLECTOMY AND ADENOIDECTOMY         ALLERGIES:  No Known Allergies    SOCIAL HISTORY:  Social History     Substance and Sexual Activity   Alcohol Use Not Currently    Comment: occasional     Social History     Substance and Sexual Activity   Drug Use No     Social History     Tobacco Use   Smoking Status Current Every Day Smoker    Packs/day: 0 25    Years: 50 00    Pack years: 12 50    Types: Cigarettes   Smokeless Tobacco Never Used   Tobacco Comment    4-5 cigarettes daily        FAMILY HISTORY:  Family History   Problem Relation Age of Onset    Heart attack Father     Other Sister         bypass and vlave replacement    Stroke Paternal Uncle     Arrhythmia Neg Hx     Asthma Neg Hx     Clotting disorder Neg Hx     Fainting Neg Hx     Anuerysm Neg Hx     Hypertension Neg Hx         unsure     Hyperlipidemia Neg Hx     Heart failure Neg Hx        MEDICATIONS:  Scheduled Meds:  Current Facility-Administered Medications   Medication Dose Route Frequency Provider Last Rate    sodium chloride  50 mL/hr Intravenous Continuous ZHANNA Alonso         REVIEW OF SYSTEMS:  A complete review of systems was done  Pertinent positives and negatives noted in the HPI but otherwise the review of systems is negative      PHYSICAL EXAM:  Current Weight: Weight - Scale: 73 3 kg (161 lb 9 6 oz)  First Weight: Weight - Scale: 73 3 kg (161 lb 9 6 oz)  Vitals:    05/05/22 0725   BP: 160/62 Pulse: (!) 52   Resp: 17   Temp: 97 8 °F (36 6 °C)   SpO2: 100%     General:  appears comfortable and in no acute distress   Skin:  No rash, warm, good skin turgor   Eyes:  Sclerae anicteric, no periorbital edema   ENT:  Moist mucous membranes  Neck:  Trachea midline, symmetric   Chest:  Clear to auscultation bilaterally with no wheezes, rales or rhonchi  CVS:  Regular rate and rhythm, + murmur  Abdomen:  Soft, nontender, nondistended  Neuro:  Awake and alert  Psych:  Appropriate affect  Extremities: no lower extremity edema     Lab Results:   Results from last 7 days   Lab Units 05/05/22  0731 05/02/22  0910   WBC Thousand/uL  --  5 34   HEMOGLOBIN g/dL  --  11 2*   HEMATOCRIT %  --  35 1*   PLATELETS Thousands/uL  --  246   SODIUM mmol/L 141 139   POTASSIUM mmol/L 3 8 3 8   CHLORIDE mmol/L 104 105   CO2 mmol/L 32 31   BUN mg/dL 50* 53*   CREATININE mg/dL 2 42* 2 30*   CALCIUM mg/dL 8 7 9 6   MAGNESIUM mg/dL  --  2 2   PHOSPHORUS mg/dL  --  3 6       Radiology Results:   No orders to display

## 2022-05-05 NOTE — H&P
Left Heart Catheterization -  Outpatient H&P  Clover Flower  76 y o  male MRN: [id number]  Unit/Bed#: [facility] CATH LAB ROOM Encounter: [id number]       PCP: Nikolas James MD  Outpatient Cardiologist: María Elena Andrew MD    Risk Factors:  -known CAD, HTN, HLD, DM2, previous tobaccom    History of Present Illness   HPI:  Clover Flower  is a 76 y o  male who presents with severe aortic stenosis, here for pre-operative cath  He has a history of CAD with CABG x2 2013 (LIMA-LAD, SVG-OM), AS with HFpEF, CAD, metabolic syndrome (HTN, HLD, DM2), active tobacco use, bilateral carotid disease, PVD + aorta-iliac disease with multiple revascularizations, CKD stage IV, colitis s/p colectomy        Historical Information   Past Medical History:   Diagnosis Date    CAD (coronary artery disease)     Carotid stenosis, asymptomatic, bilateral     Chronic kidney disease     Diabetes ([facility] [address] )     type 2, non-insulin dependent    GERD (gastroesophageal reflux disease)     History of nephrolithiasis     Hyperlipidemia     Hypertension     [facility])     Severe aortic stenosis      Past Surgical History:   Procedure Laterality Date    APPENDECTOMY      COLECTOMY      COLONOSCOPY  2013    CORONARY ARTERY BYPASS GRAFT  2013    X 2    FEMORAL ARTERY - POPLITEAL ARTERY BYPASS GRAFT      HEMORRHOID SURGERY      IR AORTAGRAM WITH RUN-OFF  11/19/2018    [place] SLCTV CATHJ 3RD+ ORD SLCTV ABDL PEL/LXTR [facility] Left 8/12/2016    Procedure: LEFT FEMORAL ARTERIOGRAM; BALLOON ANGIOPLASTY; SFA  AND FEMORAL AT VEIN GRAFT;  Surgeon: Silvia Rodriguez MD;  Location:  MAIN OR;  Service: Vascular    TONSILLECTOMY AND ADENOIDECTOMY       Social History   Social History     Substance and Sexual Activity   Alcohol Use Not Currently    Comment: occasional     Social History     Substance and Sexual Activity   Drug Use No     Social History     Tobacco Use   Smoking Status Current Every Day Smoker    Packs/day: 0 25    Years: 50 00    Pack years: 12 50    Types: Cigarettes   Smokeless Tobacco Never Used   Tobacco Comment    4-5 cigarettes daily      Family History:   Family History   Problem Relation Age of Onset    Heart attack Father     Other Sister         bypass and vlave replacement    Stroke Paternal Uncle     Arrhythmia Neg Hx     Asthma Neg Hx     Clotting disorder Neg Hx     Fainting Neg Hx     Anuerysm Neg Hx     Hypertension Neg Hx         unsure     Hyperlipidemia Neg Hx     Heart failure Neg Hx        Meds/Allergies   all medications and allergies reviewed  No Known Allergies    Physical Exam  /62   Pulse (!) 52   Temp 97 8 °F (36 6 °C) (Oral)   Resp 17   Ht 5' 10" (1 778 m)   Wt 73 3 kg (161 lb 9 6 oz)   SpO2 100%   BMI 23 19 kg/m²       GEN: Brigitte Peer  appears well, alert and oriented x 3, pleasant and cooperative   HEENT:  Normocephalic, atraumatic, anicteric, moist mucous membranes  NECK: No JVD; +carotid bruits   HEART: Regular rhythm, normal rate, normal S1 and S2, + murmur in RUSB  LUNGS: Clear to auscultation bilaterally; no wheezes, rales, or rhonchi; respiration nonlabored   ABDOMEN:  Normoactive bowel sounds, soft, no tenderness, no distention  EXTREMITIES: + edema  NEURO: no gross focal findings; cranial nerves grossly intact   SKIN:  Dry, intact, warm to touch  ACCESS: Palpable right femoral pulse      Previous Cath/PCI:  9/2013 [facility]      Previous STRESS TEST:  No results found for this or any previous visit  ECHO:  Results for orders placed during the hospital encounter of 03/22/22    Echo complete w/ contrast if indicated    Interpretation Summary    Left Ventricle: Left ventricular cavity size is normal  The left ventricular ejection fraction is 50%  Systolic function is low normal  Diastolic function is moderately abnormal, consistent with grade II (pseudonormal) relaxation  There is mild concentric hypertrophy   There is moderate hypokinesis of the basal to mid inferior wall    Right Ventricle: Right ventricular cavity size is normal  Systolic function is normal     Left Atrium: The atrium is mildly dilated    Aortic Valve: There is mild regurgitation  There is severe stenosis    Mitral Valve: There is moderate regurgitation  ELANA:  No results found for this or any previous visit  CMR:  No results found for this or any previous visit  Lab Results: I have personally reviewed pertinent lab results  Results from last 7 days   Lab Units 05/05/22  0731 05/02/22  0910 05/02/22  0910   POTASSIUM mmol/L 3 8   < > 3 8   CO2 mmol/L 32   < > 31   CHLORIDE mmol/L 104   < > 105   BUN mg/dL 50*   < > 53*   CREATININE mg/dL 2 42*   < > 2 30*   ALT U/L  --   --  30   AST U/L  --   --  16    < > = values in this interval not displayed  Results from last 7 days   Lab Units 05/02/22  0910   WBC Thousand/uL 5 34   HEMOGLOBIN g/dL 11 2*   HEMATOCRIT % 35 1*   MCV fL 89   PLATELETS Thousands/uL 246             Assessment/Plan     Assessment:    1  Pre-TAVR work-up  -9/2013 LHC (Taylori, RRA): 90% distal LMCA, 80% mid LAD + D1, 95% prox LCX, 60% prox RCA  -CAD with CABG x2 2013 (LIMA-LAD, SVG-OM)  -3/24 TTE: LVEF 50%, mild LVH, basal-mid inferior WMA, normal RV, severe AS (LUIS 0 8 cm2, mean 34 mmHg, 3 7 m/s, DI 0 2)  -LDL 36 (2021), A1c 5 6%, Cr 2 3, trop 727 in March 2022  -clopidogrel, atorvastatin, torsemide, amlodipine      Plan:  1  Proceed with left heart cath        Case discussed and reviewed with Dr Danuta Aviles who agrees with my assessment and plan  Thank you for involving us in the care of your patient  Symone Rivas MD  Cardiology Fellow PGY-6      ==========================================================================================    Epic/ Allscripts/Care Everywhere records reviewed: yes    ** Please Note: Fluency DirectDictation voice to text software may have been used in the creation of this document   **

## 2022-05-05 NOTE — DISCHARGE INSTRUCTIONS
1  Please see the post cardiac catheterization dishcarge instructions  No heavy lifting, greater than 10 lbs  or strenuous  activity for 48 hrs  2 Remove band aid tomorrow  Shower and wash area- groin gently with soap and water- beginning tomorrow  Rinse and pat dry  Apply new water seal band aid  Repeat this process for 5 days  No powders, creams lotions or antibiotic ointments  for 5 days  No tub baths, hot tubs or swimming for 5 days  3  Please call our office ([phone]) if you have any fever, redness, swelling, discharge from your groin access site  4 No driving for 1 day    5  Perclose Booklet     Left Heart Catheterization   WHAT YOU NEED TO KNOW:   A left heart catheterization is a procedure to look at your heart and its arteries  You may need this procedure if you have chest pain, heart disease, or your heart is not working as it should  DISCHARGE INSTRUCTIONS:   Call 911 for any of the following:   · You have any of the following signs of a heart attack:      ? Squeezing, pressure, or pain in your chest     ? and  any of the following:     ? Discomfort or pain in your back, neck, jaw, stomach, or arm     ? Shortness of breath    ? Nausea or vomiting    ? Lightheadedness or a sudden cold sweat    · You have any of the following signs of a stroke:      ? Numbness or drooping on one side of your face     ? Weakness in an arm or leg    ? Confusion or difficulty speaking    ? Dizziness, a severe headache, or vision loss    Seek care immediately if:   · Your arm or leg feels warm, tender, and painful  It may look swollen and red  · The leg or arm used for your angiogram is numb, painful, or changes color  · The bruise at your catheter site gets bigger or becomes swollen  · Your wound does not stop bleeding even after you apply firm pressure for 15 minutes  · You have weakness in an arm or leg  · You become confused or have difficulty speaking      · You have dizziness, a severe headache, or vision loss  Contact your healthcare provider if:   · You have a fever  · Your catheter site is red, leaks pus, or smells bad  · You have increasing pain at your catheter site  · You have questions or concerns about your condition or care  Limit activity as directed:   · Avoid unnecessary stair climbing for 48 hours, if a catheter was put in your groin  · Do not place pressure on your arm, hand, or wrist, if the catheter was placed in your wrist  Avoid pushing, pulling, or heavy lifting with that arm  · If you need to cough, support the area where the catheter was inserted with your hand  · Ask your healthcare provider how long you need to limit movement and avoid certain activities  · You may feel like resting more after your procedure  Slowly start to do more each day  Rest when you feel it is needed  Keep your bandage clean and dry:  Ask your healthcare provider when you can bathe and shower  Do not take baths or go in pools or hot tubs  Check your site every day for signs of infection such as swelling, redness, or pus  Drink liquids as directed:  Liquids help flush the dye used for your procedure out of your body  Ask your healthcare provider how much liquid to drink each day, and which liquids to drink  Some foods, such as soup and fruit, also provide liquid  Limit alcohol:  Do not drink alcohol for 24 hours after your procedure  Then limit alcohol  Women should limit alcohol to 1 drink a day  Men should limit alcohol to 2 drinks a day  A drink of alcohol is 12 ounces of beer, 5 ounces of wine, or 1½ ounces of liquor  Do not smoke:  Nicotine and other chemicals in cigarettes and cigars can damage your blood vessels  Ask your healthcare provider for information if you currently smoke and need help to quit  E-cigarettes or smokeless tobacco still contain nicotine  Talk to your healthcare provider before you use these products     Follow up with your healthcare provider as directed:  Write down your questions so you remember to ask them during your visits  © Copyright [employer] 2018 Information is for End User's use only and may not be sold, redistributed or otherwise used for commercial purposes  All illustrations and images included in CareNotes® are the copyrighted property of A D A M [employer] Inc  or Kerry Granado  The above information is an  only  It is not intended as medical advice for individual conditions or treatments  Talk to your doctor, nurse or pharmacist before following any medical regimen to see if it is safe and effective for you

## 2022-05-08 ENCOUNTER — OFFICE VISIT (OUTPATIENT)
Dept: URGENT CARE | Facility: MEDICAL CENTER | Age: 77
End: 2022-05-08
Payer: MEDICARE

## 2022-05-08 VITALS
HEART RATE: 62 BPM | OXYGEN SATURATION: 96 % | TEMPERATURE: 97.3 F | SYSTOLIC BLOOD PRESSURE: 129 MMHG | HEIGHT: 70 IN | BODY MASS INDEX: 23.05 KG/M2 | WEIGHT: 161 LBS | RESPIRATION RATE: 18 BRPM | DIASTOLIC BLOOD PRESSURE: 51 MMHG

## 2022-05-08 DIAGNOSIS — M10.9 ACUTE GOUT INVOLVING TOE OF RIGHT FOOT, UNSPECIFIED CAUSE: Primary | ICD-10-CM

## 2022-05-08 PROCEDURE — G0463 HOSPITAL OUTPT CLINIC VISIT: HCPCS | Performed by: PHYSICIAN ASSISTANT

## 2022-05-08 PROCEDURE — 99213 OFFICE O/P EST LOW 20 MIN: CPT | Performed by: PHYSICIAN ASSISTANT

## 2022-05-08 RX ORDER — PREDNISONE 20 MG/1
20 TABLET ORAL DAILY
Qty: 5 TABLET | Refills: 0 | Status: SHIPPED | OUTPATIENT
Start: 2022-05-08 | End: 2022-05-13

## 2022-05-08 NOTE — PROGRESS NOTES
[facility]        NAME: True Joshua  is a 76 y o  male  : 1945    MRN: [id number]  DATE: May 8, 2022  TIME: 11:59 AM    Assessment and Plan   Acute gout involving toe of right foot, unspecified cause [M10 9]  1  Acute gout involving toe of right foot, unspecified cause  predniSONE 20 mg tablet         Patient Instructions     Gout   Prednisone once daily x5 days  Follow up with PCP in 3-5 days  Proceed to  ER if symptoms worsen  Chief Complaint     Chief Complaint   Patient presents with    Gout     right great toe for the past 4 days         History of Present Illness       69-year-old male who presents complaining with a history of gout complains of pain and swelling to right great toe  Patient states he has had similar episodes in the past which have been successfully treated with prednisone  Denies trauma, fevers      Review of Systems   Review of Systems   Constitutional: Negative  HENT: Negative  Eyes: Negative  Respiratory: Negative  Negative for apnea, cough, choking, chest tightness, shortness of breath, wheezing and stridor  Cardiovascular: Negative  Negative for chest pain  Musculoskeletal: Positive for arthralgias           Current Medications       Current Outpatient Medications:     allopurinol (ZYLOPRIM) 100 mg tablet, Take 1 tablet (100 mg total) by mouth daily, Disp: 90 tablet, Rfl: 3    amLODIPine (NORVASC) 10 mg tablet, TAKE 1 TABLET DAILY, Disp: 90 tablet, Rfl: 3    AMYLASE-LIPASE-PROTEASE PO, Take by mouth , Disp: , Rfl:     aspirin (ECOTRIN LOW STRENGTH) 81 mg EC tablet, Take 81 mg by mouth daily, Disp: , Rfl:     atorvastatin (LIPITOR) 80 mg tablet, TAKE 1 TABLET DAILY AT     BEDTIME, Disp: 90 tablet, Rfl: 3    clopidogrel (PLAVIX) 75 mg tablet, TAKE 1 TABLET DAILY, Disp: 90 tablet, Rfl: 3    Cyanocobalamin (VITAMIN B12 PO), Take by mouth once a week  , Disp: , Rfl:     Magnesium Oxide (MAG-200 PO), Take by mouth once a week , Disp: , Rfl:     Multiple Vitamin (MULTIVITAMIN) tablet, Take 1 tablet by mouth daily 1-2 times a week , Disp: , Rfl:     pantoprazole (PROTONIX) 40 mg tablet, Take 1 tablet (40 mg total) by mouth daily, Disp: 90 tablet, Rfl: 1    torsemide (DEMADEX) 20 mg tablet, Take 1 tablet (20 mg total) by mouth 2 (two) times a day, Disp: 180 tablet, Rfl: 3    predniSONE 20 mg tablet, Take 1 tablet (20 mg total) by mouth daily for 5 days, Disp: 5 tablet, Rfl: 0    Current Allergies     Allergies as of 05/08/2022    (No Known Allergies)            The following portions of the patient's history were reviewed and updated as appropriate: allergies, current medications, past family history, past medical history, past social history, past surgical history and problem list      Past Medical History:   Diagnosis Date    CAD (coronary artery disease)     Carotid stenosis, asymptomatic, bilateral     Chronic kidney disease     Diabetes ([facility] [address] )     type 2, non-insulin dependent    GERD (gastroesophageal reflux disease)     History of nephrolithiasis     Hyperlipidemia     Hypertension     [facility]     Severe aortic stenosis        Past Surgical History:   Procedure Laterality Date    APPENDECTOMY      CARDIAC CATHETERIZATION N/A 5/5/2022    Procedure: CARDIAC RHC/LHC; Surgeon: Timmy Rincon DO;  [facility]; Service: Cardiology    CARDIAC CATHETERIZATION N/A 5/5/2022    Procedure: Cardiac Coronary Angiogram;  Surgeon: Timmy Rincon DO;  [facility];   Service: Cardiology    COLECTOMY      COLONOSCOPY  2013    CORONARY ARTERY BYPASS GRAFT  2013    X 2    FEMORAL ARTERY - POPLITEAL ARTERY BYPASS GRAFT      HEMORRHOID SURGERY      IR AORTAGRAM WITH RUN-OFF  11/19/2018    [place] SLCTV CATHJ 3RD+ ORD SLCTV ABDL PEL/LXTR [facility] Left 8/12/2016    Procedure: LEFT FEMORAL ARTERIOGRAM; BALLOON ANGIOPLASTY; SFA  AND FEMORAL AT VEIN GRAFT;  Surgeon: Lennie Abad Beatrice Amezquita MD;  Location: [facility] MAIN [facility];  Service: Vascular    TONSILLECTOMY AND ADENOIDECTOMY         Family History   Problem Relation Age of Onset    Heart attack Father     Other Sister         bypass and vlave replacement    Stroke Paternal Uncle     Arrhythmia Neg Hx     Asthma Neg Hx     Clotting disorder Neg Hx     Fainting Neg Hx     Anuerysm Neg Hx     Hypertension Neg Hx         unsure     Hyperlipidemia Neg Hx     Heart failure Neg Hx          Medications have been verified  Objective   /51   Pulse 62   Temp (!) 97 3 °F (36 3 °C)   Resp 18   Ht 5' 10" (1 778 m)   Wt 73 kg (161 lb)   SpO2 96%   BMI 23 10 kg/m²        Physical Exam     Physical Exam  Constitutional:       General: He is not in acute distress  Appearance: He is well-developed  He is not diaphoretic  Cardiovascular:      Rate and Rhythm: Normal rate and regular rhythm  Heart sounds: Normal heart sounds  Pulmonary:      Effort: Pulmonary effort is normal  No respiratory distress  Breath sounds: Normal breath sounds  No wheezing or rales  Chest:      Chest wall: No tenderness  Musculoskeletal:      Cervical back: Normal range of motion and neck supple  Legs:    Lymphadenopathy:      Cervical: No cervical adenopathy

## 2022-05-08 NOTE — PATIENT INSTRUCTIONS
Gout   Prednisone once daily x5 days  Follow up with PCP in 3-5 days  Proceed to  ER if symptoms worsen  Gout   WHAT YOU NEED TO KNOW:   Gout is a form of arthritis that causes severe joint pain, redness, swelling, and stiffness  Acute gout pain starts suddenly, gets worse quickly, and stops on its own  Acute gout can become chronic and cause permanent damage to the joints  DISCHARGE INSTRUCTIONS:   Return to the emergency department if:   · You have severe pain in one or more of your joints that you cannot tolerate  · You have a fever or redness that spreads beyond the joint area  Call your doctor if:   · You have new symptoms, such as a rash, after you start gout treatment  · Your joint pain and swelling do not go away, even after treatment  · You are not urinating as much or as often as you usually do  · You have trouble taking your gout medicines  · You have questions or concerns about your condition or care  Medicines: You may need any of the following:  · Prescription pain medicine  may be given  Ask your healthcare provider how to take this medicine safely  Some prescription pain medicines contain acetaminophen  Do not take other medicines that contain acetaminophen without talking to your healthcare provider  Too much acetaminophen may cause liver damage  Prescription pain medicine may cause constipation  Ask your healthcare provider how to prevent or treat constipation  · NSAIDs , such as ibuprofen, help decrease swelling, pain, and fever  This medicine is available with or without a doctor's order  NSAIDs can cause stomach bleeding or kidney problems in certain people  If you take blood thinner medicine, always ask your healthcare provider if NSAIDs are safe for you  Always read the medicine label and follow directions  · Gout medicine  decreases joint pain and swelling  It may also be given to prevent new gout attacks      · Steroids  reduce inflammation and can help your joint stiffness and pain during gout attacks  · Uric acid medicine  may be given to reduce the amount of uric acid your body makes  Some medicines may help you pass more uric acid when you urinate  · Take your medicine as directed  Contact your healthcare provider if you think your medicine is not helping or if you have side effects  Tell him or her if you are allergic to any medicine  Keep a list of the medicines, vitamins, and herbs you take  Include the amounts, and when and why you take them  Bring the list or the pill bottles to follow-up visits  Carry your medicine list with you in case of an emergency  Manage your symptoms:       · Rest your painful joint so it can heal   Your healthcare provider may recommend crutches or a walker if the affected joint is in a leg  · Apply ice to your joint  Ice decreases pain and swelling  Use an ice pack, or put crushed ice in a plastic bag  Cover the ice pack or bag with a towel before you apply it to your painful joint  Apply ice for 15 to 20 minutes every hour, or as directed  · Elevate your joint  Elevation helps reduce swelling and pain  Raise your joint above the level of your heart as often as you can  Prop your painful joint on pillows to keep it above your heart comfortably  · Go to physical therapy if directed  A physical therapist can teach you exercises to improve flexibility and range of motion  Help prevent gout attacks:   · Do not eat high-purine foods  These foods include meats, seafood, asparagus, spinach, cauliflower, and some types of beans  Healthcare providers may tell you to eat more low-fat milk products, such as yogurt  Milk products may decrease your risk for gout attacks  Vitamin C and coffee may also help  Your healthcare provider or dietitian can help you create a meal plan  · Drink liquids as directed  Liquids such as water help remove uric acid from your body   Ask how much liquid to drink each day and which liquids are best for you  · Maintain a healthy weight  Weight loss may decrease the amount of uric acid in your body  Ask your healthcare provider what a healthy weight is for you  Ask him or her to help you create a weight loss plan if you are overweight  · Control your blood sugar level if you have diabetes  Keep your blood sugar level in a normal range  This can help prevent gout attacks  · Limit or do not drink alcohol as directed  Alcohol can trigger a gout attack  Alcohol also increases your risk for dehydration  Ask your healthcare provider if alcohol is safe for you  Follow up with your doctor as directed: You may be referred to a rheumatologist or podiatrist  Write down your questions so you remember to ask them during your visits  © [employer] 2022 Information is for End User's use only and may not be sold, redistributed or otherwise used for commercial purposes  All illustrations and images included in CareNotes® are the copyrighted property of A D A M , Inc  or [facility] Jame Villarreal   The above information is an  only  It is not intended as medical advice for individual conditions or treatments  Talk to your doctor, nurse or pharmacist before following any medical regimen to see if it is safe and effective for you

## 2022-05-10 ENCOUNTER — PATIENT OUTREACH (OUTPATIENT)
Dept: FAMILY MEDICINE CLINIC | Facility: CLINIC | Age: 77
End: 2022-05-10

## 2022-05-11 ENCOUNTER — OFFICE VISIT (OUTPATIENT)
Dept: CARDIOLOGY CLINIC | Facility: CLINIC | Age: 77
End: 2022-05-11
Payer: MEDICARE

## 2022-05-11 ENCOUNTER — TELEPHONE (OUTPATIENT)
Dept: CARDIOLOGY CLINIC | Facility: CLINIC | Age: 77
End: 2022-05-11

## 2022-05-11 VITALS
BODY MASS INDEX: 23.46 KG/M2 | DIASTOLIC BLOOD PRESSURE: 40 MMHG | SYSTOLIC BLOOD PRESSURE: 142 MMHG | HEART RATE: 73 BPM | WEIGHT: 163.9 LBS | HEIGHT: 70 IN

## 2022-05-11 DIAGNOSIS — I73.9 PVD (PERIPHERAL VASCULAR DISEASE) (HCC): ICD-10-CM

## 2022-05-11 DIAGNOSIS — I25.10 CORONARY ARTERY DISEASE INVOLVING NATIVE HEART WITHOUT ANGINA PECTORIS, UNSPECIFIED VESSEL OR LESION TYPE: Primary | ICD-10-CM

## 2022-05-11 DIAGNOSIS — E78.2 MIXED HYPERLIPIDEMIA: ICD-10-CM

## 2022-05-11 DIAGNOSIS — Z95.1 S/P CABG (CORONARY ARTERY BYPASS GRAFT): Chronic | ICD-10-CM

## 2022-05-11 DIAGNOSIS — I70.213 ATHEROSCLEROSIS OF NATIVE ARTERIES OF EXTREMITIES WITH INTERMITTENT CLAUDICATION, BILATERAL LEGS (HCC): ICD-10-CM

## 2022-05-11 DIAGNOSIS — R00.1 SINUS BRADYCARDIA: ICD-10-CM

## 2022-05-11 DIAGNOSIS — I77.9 AORTO-ILIAC DISEASE (HCC): Chronic | ICD-10-CM

## 2022-05-11 DIAGNOSIS — I35.0 AORTIC STENOSIS, SEVERE: ICD-10-CM

## 2022-05-11 DIAGNOSIS — I10 PRIMARY HYPERTENSION: ICD-10-CM

## 2022-05-11 PROCEDURE — 99214 OFFICE O/P EST MOD 30 MIN: CPT | Performed by: INTERNAL MEDICINE

## 2022-05-11 NOTE — PROGRESS NOTES
Cardiology   Rasheeda Euceda  76 y o  male MRN: [id number]        Impression:  1  Coronary artery disease s/p CABG - has RCA 80% lesion may need to be stented  2  Hypertension - borderline control  3  Severe bilateral carotid disease - followed by vascular surgery  4  Dyslipidemia - doing well  5  Peripheral arterial disease - s/p LLE revascularization  Doing well  6  Aortic stenosis - severe as of echo 5/21   Thinking about TAVR  7  Bradycardia - improved off b-blockers    8  CKD - GFR 35      Recommendations:  1  Continue current medications  2  Await decision re: TAVR  Will message CTS  3  Follow up in 3 months  HPI: Rasheeda Euceda  is a 76y o  year old male with coronary artery disease s/p CABG and LLE revascularization, severe aortic stenosis,  who returns for follow up  No chest pain, shortness of breath, or palpitations  Rare dizziness    Recent echo 4/29 [id number] normal LV systolic function with severe aortic stenosis   Holter monitor demonstrated occasional bradycardia with maximum of 2 7 second pauses   Patient has been having issues with ischemic colitis  Is feeling slightly better - starting iron therapy  Does have some dyspnea on exertion, but improving  Is having increased LE edema  Had coronary angiography as part of TAVR workup - awaiting cardiac surgical plan  May need PCI of RCA              Review of Systems   Constitutional: Negative  HENT: Negative  Eyes: Negative  Respiratory: Negative for chest tightness and shortness of breath  Cardiovascular: Positive for leg swelling  Negative for chest pain and palpitations  Gastrointestinal: Negative  Endocrine: Negative  Genitourinary: Negative  Musculoskeletal: Negative  Skin: Negative  Allergic/Immunologic: Negative  Neurological: Negative  Hematological: Negative  Psychiatric/Behavioral: Negative  All other systems reviewed and are negative          Past Medical History: Diagnosis Date    CAD (coronary artery disease)     Carotid stenosis, asymptomatic, bilateral     Chronic kidney disease     Diabetes ([facility] [address] )     type 2, non-insulin dependent    GERD (gastroesophageal reflux disease)     History of nephrolithiasis     Hyperlipidemia     Hypertension     [facility]peripheral artery disease) ([facility])     Severe aortic stenosis      Past Surgical History:   Procedure Laterality Date    APPENDECTOMY      CARDIAC CATHETERIZATION N/A 5/5/2022    Procedure: CARDIAC RHC/LHC; Surgeon: Carolyn Sousa DO;  [facility]; Service: Cardiology    CARDIAC CATHETERIZATION N/A 5/5/2022    Procedure: Cardiac Coronary Angiogram;  Surgeon: Carolyn Sousa DO;  [facility];   Service: Cardiology    COLECTOMY      COLONOSCOPY  2013    CORONARY ARTERY BYPASS GRAFT  2013    X 2    FEMORAL ARTERY - POPLITEAL ARTERY BYPASS GRAFT      HEMORRHOID SURGERY      IR AORTAGRAM WITH RUN-OFF  11/19/2018    [place] SLCTV CATHJ 3RD+ ORD SLCTV ABDL PEL/LXTR [facility] Left 8/12/2016    Procedure: LEFT FEMORAL ARTERIOGRAM; BALLOON ANGIOPLASTY; SFA  AND FEMORAL AT VEIN GRAFT;  Surgeon: Normand Gosselin, MD;  Location: [facility] MAIN [facility];  Service: Vascular    TONSILLECTOMY AND ADENOIDECTOMY       Social History     Substance and Sexual Activity   Alcohol Use Not Currently    Comment: occasional     Social History     Substance and Sexual Activity   Drug Use No     Social History     Tobacco Use   Smoking Status Current Every Day Smoker    Packs/day: 0 25    Years: 50 00    Pack years: 12 50    Types: Cigarettes   Smokeless Tobacco Never Used   Tobacco Comment    4-5 cigarettes daily      Family History   Problem Relation Age of Onset    Heart attack Father     Other Sister         bypass and vlave replacement    Stroke Paternal Uncle     Arrhythmia Neg Hx     Asthma Neg Hx     Clotting disorder Neg Hx     Fainting Neg Hx     Anuerysm Neg Hx     Hypertension Neg Hx unsure     Hyperlipidemia Neg Hx     Heart failure Neg Hx        Allergies:  No Known Allergies    Medications:     Current Outpatient Medications:     allopurinol (ZYLOPRIM) 100 mg tablet, Take 1 tablet (100 mg total) by mouth daily, Disp: 90 tablet, Rfl: 3    amLODIPine (NORVASC) 10 mg tablet, TAKE 1 TABLET DAILY, Disp: 90 tablet, Rfl: 3    AMYLASE-LIPASE-PROTEASE PO, Take by mouth , Disp: , Rfl:     aspirin (ECOTRIN LOW STRENGTH) 81 mg EC tablet, Take 81 mg by mouth daily, Disp: , Rfl:     atorvastatin (LIPITOR) 80 mg tablet, TAKE 1 TABLET DAILY AT     BEDTIME, Disp: 90 tablet, Rfl: 3    clopidogrel (PLAVIX) 75 mg tablet, TAKE 1 TABLET DAILY, Disp: 90 tablet, Rfl: 3    Cyanocobalamin (VITAMIN B12 PO), Take by mouth once a week  , Disp: , Rfl:     Magnesium Oxide (MAG-200 PO), Take by mouth once a week , Disp: , Rfl:     Multiple Vitamin (MULTIVITAMIN) tablet, Take 1 tablet by mouth daily 1-2 times a week , Disp: , Rfl:     pantoprazole (PROTONIX) 40 mg tablet, Take 1 tablet (40 mg total) by mouth daily, Disp: 90 tablet, Rfl: 1    predniSONE 20 mg tablet, Take 1 tablet (20 mg total) by mouth daily for 5 days, Disp: 5 tablet, Rfl: 0    torsemide (DEMADEX) 20 mg tablet, Take 1 tablet (20 mg total) by mouth 2 (two) times a day, Disp: 180 tablet, Rfl: 3      Wt Readings from Last 3 Encounters:   05/11/22 74 3 kg (163 lb 14 4 oz)   05/08/22 73 kg (161 lb)   05/05/22 73 3 kg (161 lb 9 6 oz)     Temp Readings from Last 3 Encounters:   05/08/22 (!) 97 3 °F (36 3 °C)   05/05/22 (!) 97 4 °F (36 3 °C) (Oral)   04/28/22 (!) 97 2 °F (36 2 °C)     BP Readings from Last 3 Encounters:   05/11/22 (!) 142/40   05/08/22 129/51   05/05/22 150/52     Pulse Readings from Last 3 Encounters:   05/11/22 73   05/08/22 62   05/05/22 59         Physical Exam  HENT:      Head: Atraumatic  Mouth/Throat:      Mouth: Mucous membranes are moist    Eyes:      Extraocular Movements: Extraocular movements intact  Cardiovascular:      Rate and Rhythm: Normal rate and regular rhythm  Heart sounds: Murmur heard  Systolic murmur is present with a grade of 2/6  Abdominal:      General: Abdomen is flat  Musculoskeletal:         General: Normal range of motion  Cervical back: Normal range of motion  Skin:     General: Skin is warm  Neurological:      General: No focal deficit present  Mental Status: He is alert and oriented to person, place, and time  Psychiatric:         Mood and Affect: Mood normal          Behavior: Behavior normal            Laboratory Studies:  CMP:  Lab Results   Component Value Date     2015    K 3 8 2022     2022    CO2 32 2022    ANIONGAP 7 2015    BUN 50 (H) 2022    CREATININE 2 42 (H) 2022    GLUCOSE 125 2015    AST 16 2022    ALT 30 2022    EGFR 24 2022       Lipid Profile:   No results found for: CHOL  Lab Results   Component Value Date    HDL 52 2022     Lab Results   Component Value Date    LDLCALC 33 2022     Lab Results   Component Value Date    TRIG 50 2022        Cardiac testing:   EKG reviewed personally:   Results for orders placed during the hospital encounter of 21    Echo complete with contrast if indicated    Narrative  [place] [address], [address] [facility]  [phone]    Transthoracic Echocardiogram  2D, M-mode, Doppler, and Color Doppler    Study date:  06-May-2021    Patient: Dior INTERIANO number: [de-identified]  Account number: [de-identified]  : 1945  Age: 64 years  Gender: Male  Status: Outpatient  Location: [place] [address]   Height: 70 in  Weight: 181 lb  BP: 150/ 58 mmHg    Indications: Assess the aortic valve      Diagnoses: I35 0 - Nonrheumatic aortic (valve) stenosis    Sonographer:  Laila Reynolds RDCS  Primary Physician:  Chris Wilson MD  Referring Physician:  Iesha Bustamante MD  Group:  [facility]  Interpreting Physician:  Ramón Koenig MD    SUMMARY    LEFT VENTRICLE:  Systolic function was normal  Ejection fraction was estimated to be 60 %  There were no regional wall motion abnormalities  Wall thickness was mildly increased  Doppler parameters were consistent with abnormal left ventricular relaxation (grade 1 diastolic dysfunction)  LEFT ATRIUM:  The atrium was mildly dilated  MITRAL VALVE:  There was mild regurgitation  AORTIC VALVE:  The valve was probably trileaflet  Leaflets exhibited moderately increased thickness and moderate calcification  There was severe stenosis  There was trace regurgitation  Valve peak gradient was 63 mmHg  Valve mean gradient was 32 mmHg  Aortic valve area was 1 cmï¾² by the continuity equation  Estimated aortic valve area (by VTI) was 0 91 cmï¾²  TRICUSPID VALVE:  There was trace regurgitation  HISTORY: PRIOR HISTORY: CAD, bradycardia, DM, dyslipidemia, PAD, CABG,    PROCEDURE: The study was performed in the [address]  This was a routine study  The transthoracic approach was used  The study included complete 2D imaging, M-mode, complete spectral Doppler, and color Doppler  The heart rate was  66 bpm, at the start of the study  Images were obtained from the parasternal, apical, subcostal, and suprasternal notch acoustic windows  Image quality was adequate  LEFT VENTRICLE: Size was normal  Systolic function was normal  Ejection fraction was estimated to be 60 %  There were no regional wall motion abnormalities  Wall thickness was mildly increased  DOPPLER: Doppler parameters were consistent  with abnormal left ventricular relaxation (grade 1 diastolic dysfunction)  RIGHT VENTRICLE: The size was normal  Systolic function was normal  Wall thickness was normal     LEFT ATRIUM: The atrium was mildly dilated  RIGHT ATRIUM: Size was normal     MITRAL VALVE: Valve structure was normal  There was normal leaflet separation  DOPPLER: The transmitral velocity was within the normal range  There was no evidence for stenosis  There was mild regurgitation  AORTIC VALVE: The valve was probably trileaflet  Leaflets exhibited moderately increased thickness and moderate calcification  DOPPLER: There was severe stenosis  There was trace regurgitation  TRICUSPID VALVE: The valve structure was normal  There was normal leaflet separation  DOPPLER: The transtricuspid velocity was within the normal range  There was no evidence for stenosis  There was trace regurgitation  PULMONIC VALVE: Leaflets exhibited normal thickness, no calcification, and normal cuspal separation  DOPPLER: The transpulmonic velocity was within the normal range  There was no significant regurgitation  PERICARDIUM: There was no pericardial effusion  The pericardium was normal in appearance  AORTA: The root exhibited normal size  SYSTEMIC VEINS: IVC: The inferior vena cava was normal in size      MEASUREMENT TABLES    2D MEASUREMENTS  LVOT   (Reference normals)  Diam   23 mm   (--)    DOPPLER MEASUREMENTS  LVOT   (Reference normals)  VTI   24 cm   (--)  R-R interval   1017 ms   (--)  HR   59 bpm   (--)  Stroke vol   99 71 ml   (--)  Cardiac output   5 88 L/min   (--)  Cardiac index   2 94 L/min/mï¾²   (--)  Aortic valve   (Reference normals)  VTI   108 cm   (--)  Peak gradient   63 mmHg   (--)  Mean gradient   32 mmHg   (--)  Valve area, cont   1 cmï¾²   (--)  Obstr index, VTI   0 22    (--)  Valve area, VTI   0 91 cmï¾²   (--)  Area index, VTI   0 46 cmï¾²/mï¾²   (--)    SYSTEM MEASUREMENT TABLES    2D  %FS: 23 52 %  Ao Diam: 3 06 cm  EDV(Teich): 137 67 ml  EF(Teich): 46 6 %  ESV(Teich): 73 51 ml  IVSd: 1 26 cm  LA Area: 24 18 cm2  LA Diam: 4 19 cm  LVEDV MOD A4C: 198 32 ml  LVEF MOD A4C: 54 08 %  LVESV MOD A4C: 91 07 ml  LVIDd: 5 34 cm  LVIDs: 4 08 cm  LVLd A4C: 10 15 cm  LVLs A4C: 8 98 cm  LVOT Diam: 2 43 cm  LVPWd: 1 18 cm  RA Area: 14 06 cm2  RVIDd: 3 75 cm  SV MOD A4C: 107 24 ml  SV(Teich): 64 16 ml    CW  AV Env  Ti: 414 84 ms  AV MaxP 54 mmHg  AV VTI: 103 91 cm  AV Vmax: 3 82 m/s  AV Vmean: 2 5 m/s  AV meanP 08 mmHg    MM  TAPSE: 2 13 cm    PW  LUIS (VTI): 1 06 cm2  LUIS Vmax: 1 04 cm2  E' Sept: 0 04 m/s  E/E' Sept: 22 83  LVOT Env  Ti: 482 08 ms  LVOT VTI: 23 7 cm  LVOT Vmax: 0 86 m/s  LVOT Vmean: 0 5 m/s  LVOT maxP 94 mmHg  LVOT meanP 25 mmHg  LVSV Dopp: 110 25 ml  MV A Pop: 1 23 m/s  MV Dec Stanton: 5 05 m/s2  MV DecT: 180 67 ms  MV E Pop: 0 91 m/s  MV E/A Ratio: 0 74  MV PHT: 52 39 ms  MVA By PHT: 4 2 cm2    [facility] Accredited Echocardiography Laboratory    Prepared and electronically signed by    Anali De Leon MD  Signed 06-May-2021 16:40:31    No results found for this or any previous visit  No results found for this or any previous visit  No results found for this or any previous visit

## 2022-05-11 NOTE — PATIENT INSTRUCTIONS
Recommendations:  1  Continue current medications  2  Await decision re: TAVR  Will message CTS  3  Follow up in 3 months

## 2022-05-11 NOTE — H&P (VIEW-ONLY)
Cardiology   Nora Tanner  76 y o  male MRN: [id number]        Impression:  1  Coronary artery disease s/p CABG - has RCA 80% lesion may need to be stented  2  Hypertension - borderline control  3  Severe bilateral carotid disease - followed by vascular surgery  4  Dyslipidemia - doing well  5  Peripheral arterial disease - s/p LLE revascularization  Doing well  6  Aortic stenosis - severe as of echo 5/21   Thinking about TAVR  7  Bradycardia - improved off b-blockers    8  CKD - GFR 35      Recommendations:  1  Continue current medications  2  Await decision re: TAVR  Will message CTS  3  Follow up in 3 months  HPI: Nora Tanner  is a 76y o  year old male with coronary artery disease s/p CABG and LLE revascularization, severe aortic stenosis,  who returns for follow up  No chest pain, shortness of breath, or palpitations  Rare dizziness    Recent echo 7/25 [id number] normal LV systolic function with severe aortic stenosis   Holter monitor demonstrated occasional bradycardia with maximum of 2 7 second pauses   Patient has been having issues with ischemic colitis  Is feeling slightly better - starting iron therapy  Does have some dyspnea on exertion, but improving  Is having increased LE edema  Had coronary angiography as part of TAVR workup - awaiting cardiac surgical plan  May need PCI of RCA              Review of Systems   Constitutional: Negative  HENT: Negative  Eyes: Negative  Respiratory: Negative for chest tightness and shortness of breath  Cardiovascular: Positive for leg swelling  Negative for chest pain and palpitations  Gastrointestinal: Negative  Endocrine: Negative  Genitourinary: Negative  Musculoskeletal: Negative  Skin: Negative  Allergic/Immunologic: Negative  Neurological: Negative  Hematological: Negative  Psychiatric/Behavioral: Negative  All other systems reviewed and are negative          Past Medical History: Diagnosis Date    CAD (coronary artery disease)     Carotid stenosis, asymptomatic, bilateral     Chronic kidney disease     Diabetes ([facility] [address] )     type 2, non-insulin dependent    GERD (gastroesophageal reflux disease)     History of nephrolithiasis     Hyperlipidemia     Hypertension     [facility]     Severe aortic stenosis      Past Surgical History:   Procedure Laterality Date    APPENDECTOMY      CARDIAC CATHETERIZATION N/A 5/5/2022    Procedure: CARDIAC RHC/LHC; Surgeon: Carolyn Sousa DO;  [facility]; Service: Cardiology    CARDIAC CATHETERIZATION N/A 5/5/2022    Procedure: Cardiac Coronary Angiogram;  Surgeon: Carolyn Sousa DO;  [facility];   Service: Cardiology    COLECTOMY      COLONOSCOPY  2013    CORONARY ARTERY BYPASS GRAFT  2013    X 2    FEMORAL ARTERY - POPLITEAL ARTERY BYPASS GRAFT      HEMORRHOID SURGERY      IR AORTAGRAM WITH RUN-OFF  11/19/2018    AK SLCTV CATHJ 3RD+ ORD SLCTV ABDL PEL/LXTR [facility] Left 8/12/2016    Procedure: LEFT FEMORAL ARTERIOGRAM; BALLOON ANGIOPLASTY; SFA  AND FEMORAL AT VEIN GRAFT;  Surgeon: Normand Gosselin, MD;  Location: [facility] MAIN OR;  Service: Vascular    TONSILLECTOMY AND ADENOIDECTOMY       Social History     Substance and Sexual Activity   Alcohol Use Not Currently    Comment: occasional     Social History     Substance and Sexual Activity   Drug Use No     Social History     Tobacco Use   Smoking Status Current Every Day Smoker    Packs/day: 0 25    Years: 50 00    Pack years: 12 50    Types: Cigarettes   Smokeless Tobacco Never Used   Tobacco Comment    4-5 cigarettes daily      Family History   Problem Relation Age of Onset    Heart attack Father     Other Sister         bypass and vlave replacement    Stroke Paternal Uncle     Arrhythmia Neg Hx     Asthma Neg Hx     Clotting disorder Neg Hx     Fainting Neg Hx     Anuerysm Neg Hx     Hypertension Neg Hx unsure     Hyperlipidemia Neg Hx     Heart failure Neg Hx        Allergies:  No Known Allergies    Medications:     Current Outpatient Medications:     allopurinol (ZYLOPRIM) 100 mg tablet, Take 1 tablet (100 mg total) by mouth daily, Disp: 90 tablet, Rfl: 3    amLODIPine (NORVASC) 10 mg tablet, TAKE 1 TABLET DAILY, Disp: 90 tablet, Rfl: 3    AMYLASE-LIPASE-PROTEASE PO, Take by mouth , Disp: , Rfl:     aspirin (ECOTRIN LOW STRENGTH) 81 mg EC tablet, Take 81 mg by mouth daily, Disp: , Rfl:     atorvastatin (LIPITOR) 80 mg tablet, TAKE 1 TABLET DAILY AT     BEDTIME, Disp: 90 tablet, Rfl: 3    clopidogrel (PLAVIX) 75 mg tablet, TAKE 1 TABLET DAILY, Disp: 90 tablet, Rfl: 3    Cyanocobalamin (VITAMIN B12 PO), Take by mouth once a week  , Disp: , Rfl:     Magnesium Oxide (MAG-200 PO), Take by mouth once a week , Disp: , Rfl:     Multiple Vitamin (MULTIVITAMIN) tablet, Take 1 tablet by mouth daily 1-2 times a week , Disp: , Rfl:     pantoprazole (PROTONIX) 40 mg tablet, Take 1 tablet (40 mg total) by mouth daily, Disp: 90 tablet, Rfl: 1    predniSONE 20 mg tablet, Take 1 tablet (20 mg total) by mouth daily for 5 days, Disp: 5 tablet, Rfl: 0    torsemide (DEMADEX) 20 mg tablet, Take 1 tablet (20 mg total) by mouth 2 (two) times a day, Disp: 180 tablet, Rfl: 3      Wt Readings from Last 3 Encounters:   05/11/22 74 3 kg (163 lb 14 4 oz)   05/08/22 73 kg (161 lb)   05/05/22 73 3 kg (161 lb 9 6 oz)     Temp Readings from Last 3 Encounters:   05/08/22 (!) 97 3 °F (36 3 °C)   05/05/22 (!) 97 4 °F (36 3 °C) (Oral)   04/28/22 (!) 97 2 °F (36 2 °C)     BP Readings from Last 3 Encounters:   05/11/22 (!) 142/40   05/08/22 129/51   05/05/22 150/52     Pulse Readings from Last 3 Encounters:   05/11/22 73   05/08/22 62   05/05/22 59         Physical Exam  HENT:      Head: Atraumatic  Mouth/Throat:      Mouth: Mucous membranes are moist    Eyes:      Extraocular Movements: Extraocular movements intact  Cardiovascular:      Rate and Rhythm: Normal rate and regular rhythm  Heart sounds: Murmur heard  Systolic murmur is present with a grade of 2/6  Abdominal:      General: Abdomen is flat  Musculoskeletal:         General: Normal range of motion  Cervical back: Normal range of motion  Skin:     General: Skin is warm  Neurological:      General: No focal deficit present  Mental Status: He is alert and oriented to person, place, and time  Psychiatric:         Mood and Affect: Mood normal          Behavior: Behavior normal            Laboratory Studies:  CMP:  Lab Results   Component Value Date     2015    K 3 8 2022     2022    CO2 32 2022    ANIONGAP 7 2015    BUN 50 (H) 2022    CREATININE 2 42 (H) 2022    GLUCOSE 125 2015    AST 16 2022    ALT 30 2022    EGFR 24 2022       Lipid Profile:   No results found for: CHOL  Lab Results   Component Value Date    HDL 52 2022     Lab Results   Component Value Date    LDLCALC 33 2022     Lab Results   Component Value Date    TRIG 50 2022        Cardiac testing:   EKG reviewed personally:   Results for orders placed during the hospital encounter of 21    Echo complete with contrast if indicated    Narrative  [place] [address], [address] [facility]  [phone]    Transthoracic Echocardiogram  2D, M-mode, Doppler, and Color Doppler    Study date:  06-May-2021    Patient: Eliana Mcqueen  MR number: [de-identified]  Account number: [de-identified]  : 1945  Age: 64 years  Gender: Male  Status: Outpatient  Location: [place] [address]   Height: 70 in  Weight: 181 lb  BP: 150/ 58 mmHg    Indications: Assess the aortic valve      Diagnoses: I35 0 - Nonrheumatic aortic (valve) stenosis    Sonographer:  Forest Marshall RDCS  Primary Physician:  Shilrey Pierce MD  Referring Physician:  Gordon Napier MD  Group:  [facility]  Interpreting Physician:  Catrachita Tristan MD    SUMMARY    LEFT VENTRICLE:  Systolic function was normal  Ejection fraction was estimated to be 60 %  There were no regional wall motion abnormalities  Wall thickness was mildly increased  Doppler parameters were consistent with abnormal left ventricular relaxation (grade 1 diastolic dysfunction)  LEFT ATRIUM:  The atrium was mildly dilated  MITRAL VALVE:  There was mild regurgitation  AORTIC VALVE:  The valve was probably trileaflet  Leaflets exhibited moderately increased thickness and moderate calcification  There was severe stenosis  There was trace regurgitation  Valve peak gradient was 63 mmHg  Valve mean gradient was 32 mmHg  Aortic valve area was 1 cmï¾² by the continuity equation  Estimated aortic valve area (by VTI) was 0 91 cmï¾²  TRICUSPID VALVE:  There was trace regurgitation  HISTORY: PRIOR HISTORY: CAD, bradycardia, DM, dyslipidemia, PAD, CABG,    PROCEDURE: The study was performed in the [address]  This was a routine study  The transthoracic approach was used  The study included complete 2D imaging, M-mode, complete spectral Doppler, and color Doppler  The heart rate was  66 bpm, at the start of the study  Images were obtained from the parasternal, apical, subcostal, and suprasternal notch acoustic windows  Image quality was adequate  LEFT VENTRICLE: Size was normal  Systolic function was normal  Ejection fraction was estimated to be 60 %  There were no regional wall motion abnormalities  Wall thickness was mildly increased  DOPPLER: Doppler parameters were consistent  with abnormal left ventricular relaxation (grade 1 diastolic dysfunction)  RIGHT VENTRICLE: The size was normal  Systolic function was normal  Wall thickness was normal     LEFT ATRIUM: The atrium was mildly dilated  RIGHT ATRIUM: Size was normal     MITRAL VALVE: Valve structure was normal  There was normal leaflet separation  DOPPLER: The transmitral velocity was within the normal range  There was no evidence for stenosis  There was mild regurgitation  AORTIC VALVE: The valve was probably trileaflet  Leaflets exhibited moderately increased thickness and moderate calcification  DOPPLER: There was severe stenosis  There was trace regurgitation  TRICUSPID VALVE: The valve structure was normal  There was normal leaflet separation  DOPPLER: The transtricuspid velocity was within the normal range  There was no evidence for stenosis  There was trace regurgitation  PULMONIC VALVE: Leaflets exhibited normal thickness, no calcification, and normal cuspal separation  DOPPLER: The transpulmonic velocity was within the normal range  There was no significant regurgitation  PERICARDIUM: There was no pericardial effusion  The pericardium was normal in appearance  AORTA: The root exhibited normal size  SYSTEMIC VEINS: IVC: The inferior vena cava was normal in size      MEASUREMENT TABLES    2D MEASUREMENTS  LVOT   (Reference normals)  Diam   23 mm   (--)    DOPPLER MEASUREMENTS  LVOT   (Reference normals)  VTI   24 cm   (--)  R-R interval   1017 ms   (--)  HR   59 bpm   (--)  Stroke vol   99 71 ml   (--)  Cardiac output   5 88 L/min   (--)  Cardiac index   2 94 L/min/mï¾²   (--)  Aortic valve   (Reference normals)  VTI   108 cm   (--)  Peak gradient   63 mmHg   (--)  Mean gradient   32 mmHg   (--)  Valve area, cont   1 cmï¾²   (--)  Obstr index, VTI   0 22    (--)  Valve area, VTI   0 91 cmï¾²   (--)  Area index, VTI   0 46 cmï¾²/mï¾²   (--)    SYSTEM MEASUREMENT TABLES    2D  %FS: 23 52 %  Ao Diam: 3 06 cm  EDV(Teich): 137 67 ml  EF(Teich): 46 6 %  ESV(Teich): 73 51 ml  IVSd: 1 26 cm  LA Area: 24 18 cm2  LA Diam: 4 19 cm  LVEDV MOD A4C: 198 32 ml  LVEF MOD A4C: 54 08 %  LVESV MOD A4C: 91 07 ml  LVIDd: 5 34 cm  LVIDs: 4 08 cm  LVLd A4C: 10 15 cm  LVLs A4C: 8 98 cm  LVOT Diam: 2 43 cm  LVPWd: 1 18 cm  RA Area: 14 06 cm2  RVIDd: 3 75 cm  SV MOD A4C: 107 24 ml  SV(Teich): 64 16 ml    CW  AV Env  Ti: 414 84 ms  AV MaxP 54 mmHg  AV VTI: 103 91 cm  AV Vmax: 3 82 m/s  AV Vmean: 2 5 m/s  AV meanP 08 mmHg    MM  TAPSE: 2 13 cm    PW  LUIS (VTI): 1 06 cm2  LUIS Vmax: 1 04 cm2  E' Sept: 0 04 m/s  E/E' Sept: 22 83  LVOT Env  Ti: 482 08 ms  LVOT VTI: 23 7 cm  LVOT Vmax: 0 86 m/s  LVOT Vmean: 0 5 m/s  LVOT maxP 94 mmHg  LVOT meanP 25 mmHg  LVSV Dopp: 110 25 ml  MV A Pop: 1 23 m/s  MV Dec [place]: 5 05 m/s2  MV DecT: 180 67 ms  MV E Pop: 0 91 m/s  MV E/A Ratio: 0 74  MV PHT: 52 39 ms  MVA By PHT: 4 2 cm2    [employer] Accredited Echocardiography Laboratory    Prepared and electronically signed by    Marisela Roblero MD  Signed 06-May-2021 16:40:31    No results found for this or any previous visit  No results found for this or any previous visit  No results found for this or any previous visit

## 2022-05-13 ENCOUNTER — TELEPHONE (OUTPATIENT)
Dept: CARDIOLOGY CLINIC | Facility: CLINIC | Age: 77
End: 2022-05-13

## 2022-05-13 ENCOUNTER — PREP FOR PROCEDURE (OUTPATIENT)
Dept: CARDIOLOGY CLINIC | Facility: CLINIC | Age: 77
End: 2022-05-13

## 2022-05-13 DIAGNOSIS — I35.0 AORTIC STENOSIS, SEVERE: Primary | ICD-10-CM

## 2022-05-13 DIAGNOSIS — I35.0 NONRHEUMATIC AORTIC VALVE STENOSIS: Primary | ICD-10-CM

## 2022-05-13 NOTE — TELEPHONE ENCOUNTER
Patient scheduled for PCI on 5/24/22 in [facility] with Dr Adam Marcus  Mailing patient instructions  Medication hold Torsemide  Patient aware he will be admitted the night prior for pre-hydration  Labs will be drawn 5/18/22  Per patient, Medicare as primary insurance

## 2022-05-18 ENCOUNTER — LAB (OUTPATIENT)
Dept: LAB | Facility: MEDICAL CENTER | Age: 77
End: 2022-05-18
Payer: MEDICARE

## 2022-05-18 DIAGNOSIS — I35.0 AORTIC STENOSIS, SEVERE: ICD-10-CM

## 2022-05-18 DIAGNOSIS — N18.4 STAGE 4 CHRONIC KIDNEY DISEASE (HCC): ICD-10-CM

## 2022-05-18 LAB
ANION GAP SERPL CALCULATED.3IONS-SCNC: 5 MMOL/L (ref 4–13)
BASOPHILS # BLD AUTO: 0.04 THOUSANDS/ΜL (ref 0–0.1)
BASOPHILS NFR BLD AUTO: 1 % (ref 0–1)
BUN SERPL-MCNC: 40 MG/DL (ref 5–25)
CALCIUM SERPL-MCNC: 9.2 MG/DL (ref 8.3–10.1)
CHLORIDE SERPL-SCNC: 107 MMOL/L (ref 100–108)
CO2 SERPL-SCNC: 28 MMOL/L (ref 21–32)
CREAT SERPL-MCNC: 2.4 MG/DL (ref 0.6–1.3)
EOSINOPHIL # BLD AUTO: 0.28 THOUSAND/ΜL (ref 0–0.61)
EOSINOPHIL NFR BLD AUTO: 6 % (ref 0–6)
ERYTHROCYTE [DISTWIDTH] IN BLOOD BY AUTOMATED COUNT: 15.8 % (ref 11.6–15.1)
GFR SERPL CREATININE-BSD FRML MDRD: 24 ML/MIN/1.73SQ M
GLUCOSE P FAST SERPL-MCNC: 135 MG/DL (ref 65–99)
HCT VFR BLD AUTO: 34.4 % (ref 36.5–49.3)
HGB BLD-MCNC: 10.9 G/DL (ref 12–17)
IMM GRANULOCYTES # BLD AUTO: 0.01 THOUSAND/UL (ref 0–0.2)
IMM GRANULOCYTES NFR BLD AUTO: 0 % (ref 0–2)
LYMPHOCYTES # BLD AUTO: 1.07 THOUSANDS/ΜL (ref 0.6–4.47)
LYMPHOCYTES NFR BLD AUTO: 24 % (ref 14–44)
MCH RBC QN AUTO: 27.9 PG (ref 26.8–34.3)
MCHC RBC AUTO-ENTMCNC: 31.7 G/DL (ref 31.4–37.4)
MCV RBC AUTO: 88 FL (ref 82–98)
MONOCYTES # BLD AUTO: 0.43 THOUSAND/ΜL (ref 0.17–1.22)
MONOCYTES NFR BLD AUTO: 9 % (ref 4–12)
NEUTROPHILS # BLD AUTO: 2.73 THOUSANDS/ΜL (ref 1.85–7.62)
NEUTS SEG NFR BLD AUTO: 60 % (ref 43–75)
NRBC BLD AUTO-RTO: 0 /100 WBCS
PLATELET # BLD AUTO: 171 THOUSANDS/UL (ref 149–390)
PMV BLD AUTO: 10.8 FL (ref 8.9–12.7)
POTASSIUM SERPL-SCNC: 4.2 MMOL/L (ref 3.5–5.3)
RBC # BLD AUTO: 3.91 MILLION/UL (ref 3.88–5.62)
SODIUM SERPL-SCNC: 140 MMOL/L (ref 136–145)
WBC # BLD AUTO: 4.56 THOUSAND/UL (ref 4.31–10.16)

## 2022-05-18 PROCEDURE — 80048 BASIC METABOLIC PNL TOTAL CA: CPT

## 2022-05-18 PROCEDURE — 85025 COMPLETE CBC W/AUTO DIFF WBC: CPT

## 2022-05-18 PROCEDURE — 36415 COLL VENOUS BLD VENIPUNCTURE: CPT

## 2022-05-18 NOTE — RESULT ENCOUNTER NOTE
Please inform patient that renal function is stable at creatinine 2 4 mg/dl  This is likely his baseline

## 2022-05-19 ENCOUNTER — TELEPHONE (OUTPATIENT)
Dept: NEPHROLOGY | Facility: CLINIC | Age: 77
End: 2022-05-19

## 2022-05-19 NOTE — TELEPHONE ENCOUNTER
----- Message from cAosta Haddad MD sent at 5/18/2022  3:55 PM EDT -----  Please inform patient that renal function is stable at creatinine 2 4 mg/dl  This is likely his baseline

## 2022-05-19 NOTE — TELEPHONE ENCOUNTER
Left message with patient advising: Please inform patient that renal function is stable at creatinine 2 4 mg/dl   This is likely his baseline

## 2022-05-23 ENCOUNTER — HOSPITAL ENCOUNTER (INPATIENT)
Facility: HOSPITAL | Age: 77
LOS: 2 days | Discharge: HOME | DRG: 247 | End: 2022-05-25
Attending: INTERNAL MEDICINE | Admitting: INTERNAL MEDICINE
Payer: MEDICARE

## 2022-05-23 DIAGNOSIS — N18.4 STAGE 4 CHRONIC KIDNEY DISEASE (HCC): Primary | ICD-10-CM

## 2022-05-23 DIAGNOSIS — Z95.5 S/P CORONARY ARTERY STENT PLACEMENT: ICD-10-CM

## 2022-05-23 DIAGNOSIS — I35.0 NONRHEUMATIC AORTIC VALVE STENOSIS: ICD-10-CM

## 2022-05-23 DIAGNOSIS — N18.4 CKD (CHRONIC KIDNEY DISEASE) STAGE 4, GFR 15-29 ML/MIN (HCC): ICD-10-CM

## 2022-05-23 DIAGNOSIS — I25.10 CORONARY ARTERY DISEASE INVOLVING NATIVE HEART WITHOUT ANGINA PECTORIS, UNSPECIFIED VESSEL OR LESION TYPE: ICD-10-CM

## 2022-05-23 LAB
ANION GAP SERPL CALCULATED.3IONS-SCNC: 8 MMOL/L (ref 4–13)
BUN SERPL-MCNC: 31 MG/DL (ref 5–25)
CALCIUM SERPL-MCNC: 9 MG/DL (ref 8.3–10.1)
CHLORIDE SERPL-SCNC: 105 MMOL/L (ref 100–108)
CO2 SERPL-SCNC: 26 MMOL/L (ref 21–32)
CREAT SERPL-MCNC: 2.43 MG/DL (ref 0.6–1.3)
GFR SERPL CREATININE-BSD FRML MDRD: 24 ML/MIN/1.73SQ M
GLUCOSE SERPL-MCNC: 136 MG/DL (ref 65–140)
POTASSIUM SERPL-SCNC: 3.7 MMOL/L (ref 3.5–5.3)
SODIUM SERPL-SCNC: 139 MMOL/L (ref 136–145)

## 2022-05-23 PROCEDURE — 80048 BASIC METABOLIC PNL TOTAL CA: CPT | Performed by: INTERNAL MEDICINE

## 2022-05-23 RX ORDER — ASPIRIN 81 MG/1
324 TABLET, CHEWABLE ORAL ONCE
Status: DISCONTINUED | OUTPATIENT
Start: 2022-05-23 | End: 2022-05-24

## 2022-05-23 RX ORDER — ALLOPURINOL 100 MG/1
100 TABLET ORAL DAILY
Status: DISCONTINUED | OUTPATIENT
Start: 2022-05-24 | End: 2022-05-25 | Stop reason: HOSPADM

## 2022-05-23 RX ORDER — AMLODIPINE BESYLATE 10 MG/1
10 TABLET ORAL DAILY
Status: DISCONTINUED | OUTPATIENT
Start: 2022-05-24 | End: 2022-05-24

## 2022-05-23 RX ORDER — ASPIRIN 81 MG/1
81 TABLET ORAL DAILY
Status: DISCONTINUED | OUTPATIENT
Start: 2022-05-24 | End: 2022-05-25 | Stop reason: HOSPADM

## 2022-05-23 RX ORDER — SODIUM CHLORIDE 9 MG/ML
50 INJECTION, SOLUTION INTRAVENOUS CONTINUOUS
Status: DISCONTINUED | OUTPATIENT
Start: 2022-05-24 | End: 2022-05-24

## 2022-05-23 RX ORDER — PANTOPRAZOLE SODIUM 40 MG/1
40 TABLET, DELAYED RELEASE ORAL DAILY
Status: DISCONTINUED | OUTPATIENT
Start: 2022-05-24 | End: 2022-05-25 | Stop reason: HOSPADM

## 2022-05-23 RX ORDER — SODIUM CHLORIDE 9 MG/ML
50 INJECTION, SOLUTION INTRAVENOUS CONTINUOUS
Status: DISCONTINUED | OUTPATIENT
Start: 2022-05-23 | End: 2022-05-23

## 2022-05-23 RX ORDER — ATORVASTATIN CALCIUM 80 MG/1
80 TABLET, FILM COATED ORAL
Status: DISCONTINUED | OUTPATIENT
Start: 2022-05-23 | End: 2022-05-25 | Stop reason: HOSPADM

## 2022-05-23 RX ORDER — SODIUM CHLORIDE 9 MG/ML
50 INJECTION, SOLUTION INTRAVENOUS CONTINUOUS
Status: DISCONTINUED | OUTPATIENT
Start: 2022-05-24 | End: 2022-05-23 | Stop reason: SDUPTHER

## 2022-05-23 RX ORDER — CLOPIDOGREL BISULFATE 75 MG/1
75 TABLET ORAL DAILY
Status: DISCONTINUED | OUTPATIENT
Start: 2022-05-24 | End: 2022-05-25 | Stop reason: HOSPADM

## 2022-05-23 NOTE — INTERVAL H&P NOTE
Prior H&P reviewed  Patient seen and examined  BP (!) 141/49   Pulse 65   SpO2 100%      After examining the patient, I find no changes to the H&P since it has been written       Accept for today's history and physical

## 2022-05-23 NOTE — Clinical Note
Prepped: right groin and right radial  Prepped with: chlorhexidine  The site was clipped  The patient was draped

## 2022-05-24 LAB
ANION GAP SERPL CALCULATED.3IONS-SCNC: 4 MMOL/L (ref 4–13)
ATRIAL RATE: 49 BPM
BASOPHILS # BLD AUTO: 0.03 THOUSANDS/ΜL (ref 0–0.1)
BASOPHILS NFR BLD AUTO: 1 % (ref 0–1)
BUN SERPL-MCNC: 39 MG/DL (ref 5–25)
CALCIUM SERPL-MCNC: 9.3 MG/DL (ref 8.3–10.1)
CHLORIDE SERPL-SCNC: 109 MMOL/L (ref 100–108)
CO2 SERPL-SCNC: 29 MMOL/L (ref 21–32)
CREAT SERPL-MCNC: 2.33 MG/DL (ref 0.6–1.3)
EOSINOPHIL # BLD AUTO: 0.32 THOUSAND/ΜL (ref 0–0.61)
EOSINOPHIL NFR BLD AUTO: 9 % (ref 0–6)
ERYTHROCYTE [DISTWIDTH] IN BLOOD BY AUTOMATED COUNT: 15.5 % (ref 11.6–15.1)
GFR SERPL CREATININE-BSD FRML MDRD: 25 ML/MIN/1.73SQ M
GLUCOSE SERPL-MCNC: 108 MG/DL (ref 65–140)
HCT VFR BLD AUTO: 33.4 % (ref 36.5–49.3)
HGB BLD-MCNC: 10.5 G/DL (ref 12–17)
IMM GRANULOCYTES # BLD AUTO: 0.01 THOUSAND/UL (ref 0–0.2)
IMM GRANULOCYTES NFR BLD AUTO: 0 % (ref 0–2)
KCT BLD-ACNC: 348 SEC (ref 89–137)
LYMPHOCYTES # BLD AUTO: 1.12 THOUSANDS/ΜL (ref 0.6–4.47)
LYMPHOCYTES NFR BLD AUTO: 30 % (ref 14–44)
MCH RBC QN AUTO: 28 PG (ref 26.8–34.3)
MCHC RBC AUTO-ENTMCNC: 31.4 G/DL (ref 31.4–37.4)
MCV RBC AUTO: 89 FL (ref 82–98)
MONOCYTES # BLD AUTO: 0.41 THOUSAND/ΜL (ref 0.17–1.22)
MONOCYTES NFR BLD AUTO: 11 % (ref 4–12)
NEUTROPHILS # BLD AUTO: 1.86 THOUSANDS/ΜL (ref 1.85–7.62)
NEUTS SEG NFR BLD AUTO: 49 % (ref 43–75)
NRBC BLD AUTO-RTO: 0 /100 WBCS
P AXIS: 73 DEGREES
PLATELET # BLD AUTO: 179 THOUSANDS/UL (ref 149–390)
PMV BLD AUTO: 10.1 FL (ref 8.9–12.7)
POTASSIUM SERPL-SCNC: 3.8 MMOL/L (ref 3.5–5.3)
PR INTERVAL: 226 MS
QRS AXIS: 63 DEGREES
QRSD INTERVAL: 122 MS
QT INTERVAL: 506 MS
QTC INTERVAL: 457 MS
RBC # BLD AUTO: 3.75 MILLION/UL (ref 3.88–5.62)
SODIUM SERPL-SCNC: 142 MMOL/L (ref 136–145)
SPECIMEN SOURCE: ABNORMAL
T WAVE AXIS: 172 DEGREES
VENTRICULAR RATE: 49 BPM
WBC # BLD AUTO: 3.75 THOUSAND/UL (ref 4.31–10.16)

## 2022-05-24 PROCEDURE — 80048 BASIC METABOLIC PNL TOTAL CA: CPT | Performed by: INTERNAL MEDICINE

## 2022-05-24 PROCEDURE — C1725 CATH, TRANSLUMIN NON-LASER: HCPCS | Performed by: INTERNAL MEDICINE

## 2022-05-24 PROCEDURE — 93010 ELECTROCARDIOGRAM REPORT: CPT | Performed by: INTERNAL MEDICINE

## 2022-05-24 PROCEDURE — 99222 1ST HOSP IP/OBS MODERATE 55: CPT | Performed by: INTERNAL MEDICINE

## 2022-05-24 PROCEDURE — 93458 L HRT ARTERY/VENTRICLE ANGIO: CPT | Performed by: INTERNAL MEDICINE

## 2022-05-24 PROCEDURE — C1887 CATHETER, GUIDING: HCPCS | Performed by: INTERNAL MEDICINE

## 2022-05-24 PROCEDURE — 99153 MOD SED SAME PHYS/QHP EA: CPT | Performed by: INTERNAL MEDICINE

## 2022-05-24 PROCEDURE — C1894 INTRO/SHEATH, NON-LASER: HCPCS | Performed by: INTERNAL MEDICINE

## 2022-05-24 PROCEDURE — 99152 MOD SED SAME PHYS/QHP 5/>YRS: CPT | Performed by: INTERNAL MEDICINE

## 2022-05-24 PROCEDURE — C1769 GUIDE WIRE: HCPCS | Performed by: INTERNAL MEDICINE

## 2022-05-24 PROCEDURE — 85025 COMPLETE CBC W/AUTO DIFF WBC: CPT | Performed by: INTERNAL MEDICINE

## 2022-05-24 PROCEDURE — B210YZZ FLUOROSCOPY OF SINGLE CORONARY ARTERY USING OTHER CONTRAST: ICD-10-PCS | Performed by: INTERNAL MEDICINE

## 2022-05-24 PROCEDURE — C1874 STENT, COATED/COV W/DEL SYS: HCPCS | Performed by: INTERNAL MEDICINE

## 2022-05-24 PROCEDURE — 92928 PRQ TCAT PLMT NTRAC ST 1 LES: CPT | Performed by: INTERNAL MEDICINE

## 2022-05-24 PROCEDURE — C9600 PERC DRUG-EL COR STENT SING: HCPCS | Performed by: INTERNAL MEDICINE

## 2022-05-24 PROCEDURE — 027034Z DILATION OF CORONARY ARTERY, ONE ARTERY WITH DRUG-ELUTING INTRALUMINAL DEVICE, PERCUTANEOUS APPROACH: ICD-10-PCS | Performed by: INTERNAL MEDICINE

## 2022-05-24 PROCEDURE — 93005 ELECTROCARDIOGRAM TRACING: CPT

## 2022-05-24 PROCEDURE — 85347 COAGULATION TIME ACTIVATED: CPT

## 2022-05-24 DEVICE — XIENCE SKYPOINT™ EVEROLIMUS ELUTING CORONARY STENT SYSTEM 2.50 MM X 08 MM / RAPID-EXCHANGE
Type: IMPLANTABLE DEVICE | Status: FUNCTIONAL
Brand: XIENCE SKYPOINT™

## 2022-05-24 RX ORDER — AMLODIPINE BESYLATE 10 MG/1
10 TABLET ORAL DAILY
Status: DISCONTINUED | OUTPATIENT
Start: 2022-05-25 | End: 2022-05-25 | Stop reason: HOSPADM

## 2022-05-24 RX ORDER — ONDANSETRON 2 MG/ML
4 INJECTION INTRAMUSCULAR; INTRAVENOUS EVERY 6 HOURS PRN
Status: DISCONTINUED | OUTPATIENT
Start: 2022-05-24 | End: 2022-05-25 | Stop reason: HOSPADM

## 2022-05-24 RX ORDER — HEPARIN SODIUM 1000 [USP'U]/ML
INJECTION, SOLUTION INTRAVENOUS; SUBCUTANEOUS AS NEEDED
Status: DISCONTINUED | OUTPATIENT
Start: 2022-05-24 | End: 2022-05-24 | Stop reason: HOSPADM

## 2022-05-24 RX ORDER — FENTANYL CITRATE 50 UG/ML
INJECTION, SOLUTION INTRAMUSCULAR; INTRAVENOUS AS NEEDED
Status: DISCONTINUED | OUTPATIENT
Start: 2022-05-24 | End: 2022-05-24 | Stop reason: HOSPADM

## 2022-05-24 RX ORDER — MIDAZOLAM HYDROCHLORIDE 2 MG/2ML
INJECTION, SOLUTION INTRAMUSCULAR; INTRAVENOUS AS NEEDED
Status: DISCONTINUED | OUTPATIENT
Start: 2022-05-24 | End: 2022-05-24 | Stop reason: HOSPADM

## 2022-05-24 RX ORDER — ACETAMINOPHEN 325 MG/1
650 TABLET ORAL EVERY 4 HOURS PRN
Status: DISCONTINUED | OUTPATIENT
Start: 2022-05-24 | End: 2022-05-25 | Stop reason: HOSPADM

## 2022-05-24 RX ORDER — SODIUM CHLORIDE 9 MG/ML
50 INJECTION, SOLUTION INTRAVENOUS CONTINUOUS
Status: DISPENSED | OUTPATIENT
Start: 2022-05-24 | End: 2022-05-24

## 2022-05-24 RX ORDER — VERAPAMIL HCL 2.5 MG/ML
AMPUL (ML) INTRAVENOUS AS NEEDED
Status: DISCONTINUED | OUTPATIENT
Start: 2022-05-24 | End: 2022-05-24 | Stop reason: HOSPADM

## 2022-05-24 RX ADMIN — AMLODIPINE BESYLATE 10 MG: 10 TABLET ORAL at 08:10

## 2022-05-24 RX ADMIN — PANCRELIPASE 24000 UNITS: 30000; 6000; 19000 CAPSULE, DELAYED RELEASE PELLETS ORAL at 17:09

## 2022-05-24 RX ADMIN — ASPIRIN 81 MG: 81 TABLET, COATED ORAL at 08:09

## 2022-05-24 RX ADMIN — ATORVASTATIN CALCIUM 80 MG: 80 TABLET, FILM COATED ORAL at 16:16

## 2022-05-24 RX ADMIN — SODIUM CHLORIDE 222.9 ML: 0.9 INJECTION, SOLUTION INTRAVENOUS at 05:28

## 2022-05-24 RX ADMIN — PANCRELIPASE 24000 UNITS: 30000; 6000; 19000 CAPSULE, DELAYED RELEASE PELLETS ORAL at 13:01

## 2022-05-24 RX ADMIN — PANTOPRAZOLE SODIUM 40 MG: 40 TABLET, DELAYED RELEASE ORAL at 08:10

## 2022-05-24 RX ADMIN — CLOPIDOGREL BISULFATE 75 MG: 75 TABLET ORAL at 08:09

## 2022-05-24 RX ADMIN — ALLOPURINOL 100 MG: 100 TABLET ORAL at 08:10

## 2022-05-24 RX ADMIN — SODIUM CHLORIDE 50 ML/HR: 0.9 INJECTION, SOLUTION INTRAVENOUS at 07:36

## 2022-05-24 RX ADMIN — SODIUM CHLORIDE 50 ML/HR: 0.9 INJECTION, SOLUTION INTRAVENOUS at 11:47

## 2022-05-24 NOTE — DISCHARGE INSTRUCTIONS
1  Please see the post angioplasty discharge instructions  No heavy lifting, greater than 10 lbs  or strenuous activity for 1 week  Follow angioplasty discharge instructions  2 Remove band aid tomorrow  Shower and wash area- wrist gently with soap and water- beginning tomorrow  Rinse and pat dry  Apply new water seal band aid  Repeat this process for 5 days  No powders, creams lotions or antibiotic ointments  for 5 days  No tub baths, hot tubs or swimming for 5 days  3  Call [place] [address] [facility] Office ([phone]) if you develop a fever, redness or drainage at your wrist access site  4  No driving for 2 days    5  Do not stop aspirin or Plavix (clopiogrel) any reason without a cardiologists consent, or the stent could block up and cause a heart attack  6  Stent card and book

## 2022-05-24 NOTE — PROGRESS NOTES
This RN contacted Tammie Warren with Cardiology due to status of patient  Per prior cardiology notes, patient has known history of bradycardia  Upon assessment of telemetry, patient consistently becomes bradycardic in the 30-40s range  Upon review, patient HR as low as 35 bpm  Upon awakening from sleep, patient asymptomatic, comfortable in room -- offering no complaints  Patient continues to be monitored on telemetry monitoring  No new orders at this time

## 2022-05-24 NOTE — DISCHARGE SUMMARY
Discharge Summary - Yeison Sigala  76 y o  male MRN: [id number]    [facility] Encounter: [id number]    Admission Date: 5/23/2022   Discharge Date: 5/25/2022    Disposition: Home      Discharge Diagnosis:   1  CAD s/p PCI / BURAK x 1 to pRCA, hx of prior CABG   ----Patent LIMA to LAD, and patent SVG to OM    Secondary Diagnoses:   1  Symptomatic severe aortic stenosis  2  Chronic diastolic CHF  3  Essential hypertension  4  Dyslipidemia  5  DM type 2  6  PAD s/p LLE revascularization  7  CKD stage IV     Condition at Discharge: good   Consultants:  Nephrology    HPI and Hospital Course:   Mr Rafael Roberts is a 77-year-old with a medical history of CAD s/p prior CABG, symptomatic severe aortic stenosis, chronic diastolic CHF, sinus bradycardia, essential hypertension, dyslipidemia, DM type 2, PAD LLE revascularization, and CKD stage 4  He follows with Dr Rolando Pitts w/[facility] last seen as an outpatient back in mid May 2022  He is currently undergoing TAVR workup as an outpatient  He had previously undergone a LHC procedure in early May which demonstrated patent LIMA to LAD graft and patent SVG to OM graft but did have evidence severe stenosis in the proximal RCA with nonobstructive disease noted in the mid RCA  He presented to the [facility] for an elective PCI procedure performed by Dr Rafy Abraham  He underwent PCI/BURAK placement x1 to the proximal RCA with residual 10% stenosis  He was continued on dual anti-platelet therapy with aspirin and clopidogrel in addition to high-intensity statin  He was not previously on nor started on a BB due to baseline sinus bradycardia  He was [facility]/telemetry unit postprocedure and was monitored overnight  Given his history of CKD he was formally evaluated by the [facility]) Nephrology Service home assisted in providing IV hydration recommendations pre and post LHC procedure  He had no significant issues overnight     On day of discharge pertinent laboratory data, hemodynamics, and telemetry were reviewed and deemed to be stable for discharge  His right radial catheterization site was assessed, surrounding area soft without hematoma and neurovascular exam of the R UE was within normal limits  His discharge creatinine was 2 00  He will follow-up with both the CTSurgery service and the General Cardiology NP on discharge  A referral for cardiac rehab has been placed and a BMP will be requested to be obtained in 3 days following discharge  Discharge instructions/medication recommendations were provided to the patient  All questions/concerns were answered and addressed to his satisfaction prior to discharge  Review of Systems   Constitutional: Negative for chills, fever and malaise/fatigue  Eyes: Negative for visual disturbance  Cardiovascular: Negative for chest pain, dyspnea on exertion, leg swelling, orthopnea and palpitations  Respiratory: Negative for cough and shortness of breath  Gastrointestinal: Negative for abdominal pain  Neurological: Negative for dizziness, headaches and light-headedness  All other systems reviewed and are negative  Physical Exam  Vitals and nursing note reviewed  Constitutional:       General: He is not in acute distress  Appearance: He is not diaphoretic  HENT:      Head: Normocephalic and atraumatic  Mouth/Throat:      Mouth: Mucous membranes are moist    Eyes:      General: No scleral icterus  Cardiovascular:      Rate and Rhythm: Normal rate and regular rhythm  Pulses: Normal pulses  Heart sounds: Normal heart sounds  No murmur heard  Pulmonary:      Effort: Pulmonary effort is normal       Breath sounds: Normal breath sounds  No wheezing or rales  Abdominal:      Palpations: Abdomen is soft  Musculoskeletal:      Cervical back: Neck supple  Right lower leg: No edema  Left lower leg: No edema  Skin:     General: Skin is warm and dry        Capillary Refill: Capillary refill takes less than 2 seconds  Comments: Right radial cath site assessed surrounding area is soft without hematoma  NVE of the RUE is WNL   Neurological:      General: No focal deficit present  Mental Status: He is alert and oriented to person, place, and time     Psychiatric:         Mood and Affect: Mood normal          Pertinent Labs/diagnostics:  Admission on 05/23/2022   Component Date Value    Sodium 05/23/2022 139     Potassium 05/23/2022 3 7     Chloride 05/23/2022 105     CO2 05/23/2022 26     ANION GAP 05/23/2022 8     BUN 05/23/2022 31 (A)    Creatinine 05/23/2022 2 43 (A)    Glucose 05/23/2022 136     Calcium 05/23/2022 9 0     eGFR 05/23/2022 24     Sodium 05/24/2022 142     Potassium 05/24/2022 3 8     Chloride 05/24/2022 109 (A)    CO2 05/24/2022 29     ANION GAP 05/24/2022 4     BUN 05/24/2022 39 (A)    Creatinine 05/24/2022 2 33 (A)    Glucose 05/24/2022 108     Calcium 05/24/2022 9 3     eGFR 05/24/2022 25     WBC 05/24/2022 3 75 (A)    RBC 05/24/2022 3 75 (A)    Hemoglobin 05/24/2022 10 5 (A)    Hematocrit 05/24/2022 33 4 (A)    MCV 05/24/2022 89     MCH 05/24/2022 28 0     MCHC 05/24/2022 31 4     RDW 05/24/2022 15 5 (A)    MPV 05/24/2022 10 1     Platelets 24/60/8151 179     nRBC 05/24/2022 0     Neutrophils Relative 05/24/2022 49     Immat GRANS % 05/24/2022 0     Lymphocytes Relative 05/24/2022 30     Monocytes Relative 05/24/2022 11     Eosinophils Relative 05/24/2022 9 (A)    Basophils Relative 05/24/2022 1     Neutrophils Absolute 05/24/2022 1 86     Immature Grans Absolute 05/24/2022 0 01     Lymphocytes Absolute 05/24/2022 1 12     Monocytes Absolute 05/24/2022 0 41     Eosinophils Absolute 05/24/2022 0 32     Basophils Absolute 05/24/2022 0 03     Activated Clotting Time,* 05/24/2022 348 (A)    Specimen Type 05/24/2022 ARTERIAL     Ventricular Rate 05/24/2022 49     Atrial Rate 05/24/2022 49     NV Interval 05/24/2022 226     QRSD Interval 05/24/2022 122     QT Interval 05/24/2022 506     QTC Interval 05/24/2022 457     P Axis 05/24/2022 73     QRS Axis 05/24/2022 63     T Wave Axis 05/24/2022 172     Sodium 05/25/2022 143     Potassium 05/25/2022 3 8     Chloride 05/25/2022 110 (A)    CO2 05/25/2022 29     ANION GAP 05/25/2022 4     BUN 05/25/2022 34 (A)    Creatinine 05/25/2022 2 00 (A)    Glucose 05/25/2022 112     Calcium 05/25/2022 9 1     eGFR 05/25/2022 30     WBC 05/25/2022 3 74 (A)    RBC 05/25/2022 3 62 (A)    Hemoglobin 05/25/2022 9 9 (A)    Hematocrit 05/25/2022 31 9 (A)    MCV 05/25/2022 88     MCH 05/25/2022 27 3     MCHC 05/25/2022 31 0 (A)    RDW 05/25/2022 15 1     Platelets 10/26/3648 178     MPV 05/25/2022 10 5     Magnesium 05/25/2022 2 3        Vitals:    05/24/22 2214 05/25/22 0522 05/25/22 0729 05/25/22 0834   BP: 146/86 143/51 142/55 (!) 130/46   BP Location:       Pulse: 64 59 65 65   Resp:  16     Temp: (!) 97 4 °F (36 3 °C) 97 6 °F (36 4 °C)     TempSrc:  Oral     SpO2: 96% 97% 96% 98%   Weight:       Height:             Vitals:    05/24/22 0528 05/24/22 0839   Weight: 71 3 kg (157 lb 3 oz) 72 6 kg (160 lb)        Condition at Discharge: good     Discharge Medications:  See after visit summary for reconciled discharge medications provided to patient and family  Discharge instructions/Information to patient and family:   See after visit summary for information provided to patient and family  Provisions for Follow-Up Care:  See after visit summary for information related to follow-up care and any pertinent home health orders  Planned Readmission: No    Discharge Statement   I spent 30 minutes minutes discharging the patient  This time was spent on the day of discharge  I had direct contact with the patient on the day of discharge  Additional documentation is required if more than 30 minutes were spent on discharge

## 2022-05-24 NOTE — CONSULTS
Consultation - Nephrology   Lori Alaniz  76 y o  male MRN: [id number]  Unit/Bed#: [facility] CATH LAB ROOM Encounter: [id number]    ASSESSMENT and PLAN:   perioperative optimization to reduce the incidence of acute kidney injury in the setting ofChronic kidney disease IV:    Etiology: Suspect secondary to hypertension nephrosclerosis,  Diabetic kidney disease,  Age-related nephron loss and prior AK I episodes  Assessment:   After review medical records through [facility] everywhere  Most recent baseline creatinine  Mid two range ( prior previous baseline 1 6-1 8)   Patient follows with Dr Maira Munguia  current creatinine 2 33 today with stable electrolytes and acid-base balance    last dose torsemide 5/23/2022 am    currently on IV fluids per Cardiology protocol    torsemide remains on hold  Plan:  · Using QX calculate- patient has a 57% risk of contrast induced nephropathy on 12 6% risk of dialysis using approximately 100 cc of contrast-discussed risks and benefits with patient and aware  · SHOAIB risk reductions:   ·  continue to hold torsemide while admitted  ·  continue IV fluids for 6 hours postprocedure  ·  avoid nephrotoxins, NSAIDs and limit IV contrast if possible  ·  avoid hypotension, perturbations of blood pressure to prevent decreased renal perfusion    check BMP in a m     recommend checking BMP 48 hours postprocedure    Has follow up scheduled on 6/28/2022 with Dr Marcos Alex in [place] office    Blood pressure/hypertension:  Assessment and Plan:   Current BP acceptable   Home medications include: amlodipine 10 mg daily, torsemide 20 mg 2 times daily   Current medications include: amlodipine 10 mg daily   Will place hold parameters for SBP less than 130 on amlodipine   Torsemide currently on hold for SHOAIB risk reduction   Maximize hemodynamics to maintain MAP >65   Avoid hypotension or fluctuations in blood pressure   Will continue to trend    Severe aortic stenosis:  Assessment and plan:  ·  undergoing TAVR evaluation  ·  status post prior cardiac catheterization 5/5/2022  ·  CT surgery following as outpatient    CAD:  Assessment and plan:  ·  recently found to have ungrafted proximal RCA calcific lesion and now presents for staged complex PCI  Today  ·  cardiology following  ·  SHOAIB risk reduction as above    Chronic diastolic congestive heart failure:  Assessment and plan:  ·  on torsemide 20 mg 2 times daily at home  ·  examining euvolemic on exam  ·  last dose torsemide 5/23/2022  A m   ·  will continue to monitor with IV fluids    H&H/anemia: Likely of CKD  Assessment and Plan:   Current hemoglobin  10 5- appears stable   Per primary team   Transfuse if hemoglobin less than 7 0    Gout:  Assessment and Plan:    now on allopurinol    symptoms have relieved    no further episodes          HISTORY OF PRESENT ILLNESS:  Requesting Physician: Elena Black MD  Reason for Consult: Perioperative optimization to reduce the incidence of acute kidney injury in the setting of Chronic Kidney Disease IV    Yeison Sigala  is a 76 y o  male who has past medical history of CKD IV,  Hypertension, hyperlipidemia, diabetes, carotid stenosis, PAD s/p fem pop bypass, CAD s/p CABG 2013, and AS currently undergoing TAVR work up who was recently found to have ungrafted proximal RCA calcific lesion and now presents for staged complex PCI  A renal consultation is requested today  For perioperative optimization to reduce the incidence of acute kidney injury in the setting of Chronic Kidney Disease IV  Patient follows with Dr Shakira Crump as outpatient and baseline creatinine most recently mid 2 range since prior admission March for CHF  Patient reports last dose torsemide yesterday morning and did not take evening dose  Currently seen in the cath lab staging area  He reports having an episode with gout and treated with two rounds of steroids and now on allopurinol  No further issues at this time  Medication list reviewed  PAST MEDICAL HISTORY:  Past Medical History:   Diagnosis Date    CAD (coronary artery disease)     Carotid stenosis, asymptomatic, bilateral     Chronic kidney disease     Diabetes ([facility] [address] )     type 2, non-insulin dependent    GERD (gastroesophageal reflux disease)     History of nephrolithiasis     Hyperlipidemia     Hypertension     [facility]ipheral artery [facility]) [facility]     Severe aortic stenosis        PAST SURGICAL HISTORY:  Past Surgical History:   Procedure Laterality Date    APPENDECTOMY      CARDIAC CATHETERIZATION N/A 5/5/2022    Procedure: CARDIAC RHC/LHC; Surgeon: Madan Bray DO;  Location: [facility]; Service: Cardiology    CARDIAC CATHETERIZATION N/A 5/5/2022    Procedure: Cardiac Coronary Angiogram;  Surgeon: Madan Bray DO;  Location: [facility];   Service: Cardiology    COLECTOMY      COLONOSCOPY  2013    CORONARY ARTERY BYPASS GRAFT  2013    X 2    FEMORAL ARTERY - POPLITEAL ARTERY BYPASS GRAFT      HEMORRHOID SURGERY      IR AORTAGRAM WITH RUN-OFF  11/19/2018    [place] SLCTV CATHJ 3RD+ ORD SLCTV ABDL PEL/LXTR [facility] Left 8/12/2016    Procedure: LEFT FEMORAL ARTERIOGRAM; BALLOON ANGIOPLASTY; SFA  AND FEMORAL AT VEIN GRAFT;  Surgeon: Evelyn Pickett MD;  Location: [facility] MAIN OR;  Service: Vascular    TONSILLECTOMY AND ADENOIDECTOMY         ALLERGIES:  No Known Allergies    SOCIAL HISTORY:  Social History     Substance and Sexual Activity   Alcohol Use Not Currently    Comment: occasional     Social History     Substance and Sexual Activity   Drug Use No     Social History     Tobacco Use   Smoking Status Current Every Day Smoker    Packs/day: 0 25    Years: 50 00    Pack years: 12 50    Types: Cigarettes   Smokeless Tobacco Never Used   Tobacco Comment    4-5 cigarettes daily        FAMILY HISTORY:  Family History   Problem Relation Age of Onset    Heart attack Father     Other Sister         bypass and vlave replacement    Stroke Paternal Uncle     Arrhythmia Neg Hx     Asthma Neg Hx     Clotting disorder Neg Hx     Fainting Neg Hx     Anuerysm Neg Hx     Hypertension Neg Hx         unsure     Hyperlipidemia Neg Hx     Heart failure Neg Hx        MEDICATIONS:    Current Facility-Administered Medications:     allopurinol (ZYLOPRIM) tablet 100 mg, 100 mg, Oral, Daily, Ame Duke, DO, 100 mg at 05/24/22 0810    amLODIPine (NORVASC) tablet 10 mg, 10 mg, Oral, Daily, Ame Duke, DO, 10 mg at 05/24/22 0810    aspirin (ECOTRIN LOW STRENGTH) EC tablet 81 mg, 81 mg, Oral, Daily, Ame Duke, DO, 81 mg at 05/24/22 0809    atorvastatin (LIPITOR) tablet 80 mg, 80 mg, Oral, Daily With Dinner, Ame TomDO cristi    clopidogrel (PLAVIX) tablet 75 mg, 75 mg, Oral, Daily, Ame Duke, DO, 75 mg at 05/24/22 0809    pancrelipase (Lip-Prot-Amyl) (CREON) delayed release capsule 24,000 Units, 24,000 Units, Oral, TID With Meals, Ame TomDO cristi    pantoprazole (PROTONIX) EC tablet 40 mg, 40 mg, Oral, Daily, Ame Duke, DO, 40 mg at 05/24/22 0810    sodium chloride 0 9 % infusion, 50 mL/hr, Intravenous, Continuous, ZHANNA Roa, Last Rate: 50 mL/hr at 05/24/22 0736, 50 mL/hr at 05/24/22 0736      REVIEW OF SYSTEMS:  Patient seen and examined at bedside  Review of Systems   Constitutional: Negative  HENT: Negative  Cardiovascular: Negative  Gastrointestinal: Negative  Endocrine: Negative  Genitourinary: Negative  Musculoskeletal:        Had an episode of gout right great toe status post treatment with steroids now on allopurinol now resolved   Skin: Negative  Neurological: Negative  Psychiatric/Behavioral: Negative            PHYSICAL EXAM:  Current weight: Weight - Scale: 72 6 kg (160 lb)  Vitals:    05/24/22 0528 05/24/22 0723 05/24/22 0811 05/24/22 0839   BP:  (!) 156/46 (!) 143/40    BP Location:  Left arm     Pulse:  (!) 54 (!) 48 62   Resp:    14   Temp:  (!) 97 2 °F (36 2 °C)  (!) 97 4 °F (36 3 °C)   TempSrc:  Oral  Temporal   SpO2:  99% 99% 96%   Weight: 71 3 kg (157 lb 3 oz)   72 6 kg (160 lb)   Height:    5' 10" (1 778 m)       Physical Exam  Constitutional:       Appearance: Normal appearance  HENT:      Head: Normocephalic and atraumatic  Nose: Nose normal       Mouth/Throat:      Mouth: Mucous membranes are moist    Eyes:      Extraocular Movements: Extraocular movements intact  Conjunctiva/sclera: Conjunctivae normal    Cardiovascular:      Rate and Rhythm: Regular rhythm  Bradycardia present  Pulses: Normal pulses  Heart sounds: Murmur heard  Pulmonary:      Effort: Pulmonary effort is normal       Breath sounds: Normal breath sounds  Abdominal:      General: Bowel sounds are normal       Palpations: Abdomen is soft  Musculoskeletal:         General: Normal range of motion  Cervical back: Normal range of motion and neck supple  Skin:     General: Skin is warm and dry  Neurological:      General: No focal deficit present  Mental Status: He is alert and oriented to person, place, and time  Psychiatric:         Mood and Affect: Mood normal          Behavior: Behavior normal          Thought Content:  Thought content normal          Judgment: Judgment normal            Invasive Devices:        Lab Results:   Results from last 7 days   Lab Units 05/24/22  0522 05/23/22  1647 05/18/22  0936   WBC Thousand/uL 3 75*  --  4 56   HEMOGLOBIN g/dL 10 5*  --  10 9*   HEMATOCRIT % 33 4*  --  34 4*   PLATELETS Thousands/uL 179  --  171   SODIUM mmol/L 142 139 140   POTASSIUM mmol/L 3 8 3 7 4 2   CHLORIDE mmol/L 109* 105 107   CO2 mmol/L 29 26 28   BUN mg/dL 39* 31* 40*   CREATININE mg/dL 2 33* 2 43* 2 40*   CALCIUM mg/dL 9 3 9 0 9 2       Other Studies:

## 2022-05-24 NOTE — QUICK NOTE
Paged by nursing staff regarding episodes of bradycardia noted on telemetry during sleep  Telemetry strips sent by nursing staff reviewed appears patient has sinus bradycardia with possible intermittent wandering atrial pacemaker  Patient is completely asymptomatic and resolves upon wakening

## 2022-05-25 VITALS
BODY MASS INDEX: 22.9 KG/M2 | HEART RATE: 65 BPM | WEIGHT: 160 LBS | OXYGEN SATURATION: 98 % | TEMPERATURE: 97.6 F | HEIGHT: 70 IN | DIASTOLIC BLOOD PRESSURE: 46 MMHG | SYSTOLIC BLOOD PRESSURE: 130 MMHG | RESPIRATION RATE: 16 BRPM

## 2022-05-25 LAB
ANION GAP SERPL CALCULATED.3IONS-SCNC: 4 MMOL/L (ref 4–13)
BUN SERPL-MCNC: 34 MG/DL (ref 5–25)
CALCIUM SERPL-MCNC: 9.1 MG/DL (ref 8.3–10.1)
CHLORIDE SERPL-SCNC: 110 MMOL/L (ref 100–108)
CO2 SERPL-SCNC: 29 MMOL/L (ref 21–32)
CREAT SERPL-MCNC: 2 MG/DL (ref 0.6–1.3)
ERYTHROCYTE [DISTWIDTH] IN BLOOD BY AUTOMATED COUNT: 15.1 % (ref 11.6–15.1)
GFR SERPL CREATININE-BSD FRML MDRD: 30 ML/MIN/1.73SQ M
GLUCOSE SERPL-MCNC: 112 MG/DL (ref 65–140)
HCT VFR BLD AUTO: 31.9 % (ref 36.5–49.3)
HGB BLD-MCNC: 9.9 G/DL (ref 12–17)
MAGNESIUM SERPL-MCNC: 2.3 MG/DL (ref 1.6–2.6)
MCH RBC QN AUTO: 27.3 PG (ref 26.8–34.3)
MCHC RBC AUTO-ENTMCNC: 31 G/DL (ref 31.4–37.4)
MCV RBC AUTO: 88 FL (ref 82–98)
PLATELET # BLD AUTO: 178 THOUSANDS/UL (ref 149–390)
PMV BLD AUTO: 10.5 FL (ref 8.9–12.7)
POTASSIUM SERPL-SCNC: 3.8 MMOL/L (ref 3.5–5.3)
RBC # BLD AUTO: 3.62 MILLION/UL (ref 3.88–5.62)
SODIUM SERPL-SCNC: 143 MMOL/L (ref 136–145)
WBC # BLD AUTO: 3.74 THOUSAND/UL (ref 4.31–10.16)

## 2022-05-25 PROCEDURE — 85027 COMPLETE CBC AUTOMATED: CPT | Performed by: STUDENT IN AN ORGANIZED HEALTH CARE EDUCATION/TRAINING PROGRAM

## 2022-05-25 PROCEDURE — 80048 BASIC METABOLIC PNL TOTAL CA: CPT | Performed by: NURSE PRACTITIONER

## 2022-05-25 PROCEDURE — 83735 ASSAY OF MAGNESIUM: CPT | Performed by: STUDENT IN AN ORGANIZED HEALTH CARE EDUCATION/TRAINING PROGRAM

## 2022-05-25 PROCEDURE — 99238 HOSP IP/OBS DSCHRG MGMT 30/<: CPT | Performed by: NURSE PRACTITIONER

## 2022-05-25 PROCEDURE — 99232 SBSQ HOSP IP/OBS MODERATE 35: CPT | Performed by: NURSE PRACTITIONER

## 2022-05-25 RX ORDER — CLOPIDOGREL BISULFATE 75 MG/1
75 TABLET ORAL DAILY
Qty: 90 TABLET | Refills: 3 | Status: SHIPPED | OUTPATIENT
Start: 2022-05-25

## 2022-05-25 RX ADMIN — PANCRELIPASE 24000 UNITS: 30000; 6000; 19000 CAPSULE, DELAYED RELEASE PELLETS ORAL at 08:33

## 2022-05-25 RX ADMIN — AMLODIPINE BESYLATE 10 MG: 10 TABLET ORAL at 08:33

## 2022-05-25 RX ADMIN — PANTOPRAZOLE SODIUM 40 MG: 40 TABLET, DELAYED RELEASE ORAL at 08:33

## 2022-05-25 RX ADMIN — ALLOPURINOL 100 MG: 100 TABLET ORAL at 08:33

## 2022-05-25 RX ADMIN — ASPIRIN 81 MG: 81 TABLET, COATED ORAL at 08:33

## 2022-05-25 RX ADMIN — CLOPIDOGREL BISULFATE 75 MG: 75 TABLET ORAL at 08:34

## 2022-05-25 NOTE — PROGRESS NOTES
Progress note- Nephrology   Yeison Sigala  76 y o  male MRN: [id number]  Unit/Bed#: [facility] 512-01 Encounter: [id number]    ASSESSMENT and PLAN:  Perioperative optimization to reduce the incidence of acute kidney injury in the setting of Chronic kidney disease IV:    Etiology: Suspect secondary to hypertension nephrosclerosis, diabetic kidney disease,  Age-related nephron loss and prior SHOAIB episodes  Assessment:  · After review medical records through [facility] and Care everywhere  Most recent baseline creatinine  Mid two range ( prior previous baseline 1 6-1 8)  · Patient follows with Dr Baylee Malin  ·  current creatinine 2 0 off diuretics and s/p IVF-now off  ·  last dose torsemide 5/23/2022 am  ·  torsemide remains on hold currently  Plan:  · Renal function stable post PCI/stent with 70% contrast on 5/4/2022  · For discharge to day per cardiology  · Okay to resume torsemide 20 mg BID tomorrow  · Examining euvolemic  · Likely higher creatinine with diuretic use   · Continue to avoid nephrotoxins, NSAIDs and limit IV contrast if possible  · Continue to avoid hypotension, perturbations of blood pressure to prevent decreased renal perfusion  · Check BMP Friday to ensure renal stability  · Has follow up scheduled on 6/28/2022 with Dr Shakira Crump in [place] office  · Instructed patient to hold evening and AM dose of Torsemide the day prior to TAVR date-pt agreeed     Blood pressure/hypertension:  Assessment and Plan:  · BP remains acceptable  · Home medications include: amlodipine 10 mg daily, torsemide 20 mg 2 times daily  · Current medications include: amlodipine 10 mg daily  · Okay to resume Torsemide in am   · Torsemide currently on hold for SHOAIB risk reduction  · Maximize hemodynamics to maintain MAP >65  · Avoid hypotension or fluctuations in blood pressure  · Will continue to trend     Severe aortic stenosis:  Assessment and plan:  ·  undergoing TAVR evaluation-has follow up appt on 6/1/2022 with CT surgery  · status post prior cardiac catheterization 5/5/2022     CAD:  Assessment and plan:  ·  S/P PCI/stent placement proximal RCA 5/24/2022  ·  cardiology following  · As above will check BMP on friday     Chronic diastolic congestive heart failure:  Assessment and plan:  ·  on torsemide 20 mg 2 times daily at home-can resume in am  ·  examining euvolemic on exam  ·  last dose torsemide 5/23/2022  A m      H&H/anemia: Likely of CKD  Assessment and Plan:  · Current hemoglobin 9 9 appears stable  · Per primary team  · Transfuse if hemoglobin less than 7 0     Gout:  Assessment and Plan:  ·  now on allopurinol  · No further issues  · No change in treatment        Disposition:  Renal function stable post PCI/Stent  Okay for discharge from renal standpoint when medical stable per primary team   Has follow up with Dr Carroll Lowery   Check BMP Friday  Discussed with cardiology this am    Review of Systems  Patient seen and examined at bedside  Review of Systems   Constitutional: Negative  HENT: Negative  Eyes: Negative  Respiratory: Negative  Cardiovascular: Negative  Gastrointestinal: Negative  Endocrine: Negative  Genitourinary: Negative  Musculoskeletal: Negative  Skin: Negative  Neurological: Negative  Psychiatric/Behavioral: Negative  Physical Exam:  Current Weight: Weight - Scale: 72 6 kg (160 lb)  Vitals:    05/24/22 2214 05/25/22 0522 05/25/22 0729 05/25/22 0834   BP: 146/86 143/51 142/55 (!) 130/46   BP Location:       Pulse: 64 59 65 65   Resp:  16     Temp: (!) 97 4 °F (36 3 °C) 97 6 °F (36 4 °C)     TempSrc:  Oral     SpO2: 96% 97% 96% 98%   Weight:       Height:           Physical Exam  Constitutional:       Appearance: Normal appearance  Comments: OOB NAD   HENT:      Head: Normocephalic and atraumatic  Nose: Nose normal       Mouth/Throat:      Mouth: Mucous membranes are moist    Eyes:      Extraocular Movements: Extraocular movements intact        Conjunctiva/sclera: Conjunctivae normal    Cardiovascular:      Rate and Rhythm: Normal rate and regular rhythm  Pulses: Normal pulses  Heart sounds: Murmur heard  Pulmonary:      Effort: Pulmonary effort is normal       Breath sounds: Normal breath sounds  Abdominal:      General: Bowel sounds are normal       Palpations: Abdomen is soft  Musculoskeletal:         General: Normal range of motion  Cervical back: Normal range of motion and neck supple  Skin:     General: Skin is warm and dry  Neurological:      General: No focal deficit present  Mental Status: He is alert and oriented to person, place, and time  Psychiatric:         Mood and Affect: Mood normal          Behavior: Behavior normal          Thought Content:  Thought content normal          Judgment: Judgment normal             Medications:    Current Facility-Administered Medications:     acetaminophen (TYLENOL) tablet 650 mg, 650 mg, Oral, Q4H PRN, Esteban Malcolm MD    [facility]) tablet 100 mg, 100 mg, Oral, Daily, Malia Locus, DO, 100 mg at 05/25/22 1785    amLODIPine (NORVASC) tablet 10 mg, 10 mg, Oral, Daily, Grant Lemon, CRNP, 10 mg at 05/25/22 8068    aspirin (ECOTRIN LOW STRENGTH) EC tablet 81 mg, 81 mg, Oral, Daily, Malia Locus, DO, 81 mg at 05/25/22 3493    atorvastatin (LIPITOR) tablet 80 mg, 80 mg, Oral, Daily With Harrah Mazon, DO, 80 mg at 05/24/22 1616    clopidogrel (PLAVIX) tablet 75 mg, 75 mg, Oral, Daily, Malia Locus, DO, 75 mg at 05/25/22 0834    ondansetron (ZOFRAN) injection 4 mg, 4 mg, Intravenous, Q6H PRN, Esteban Malcolm MD    pancrelipase (Lip-Prot-Amyl) (CREON) delayed release capsule 24,000 Units, 24,000 Units, Oral, TID With Meals, Malia Locus, DO, 24,000 Units at 05/25/22 6765    pantoprazole (PROTONIX) EC tablet 40 mg, 40 mg, Oral, Daily, Malia Locus, DO, 40 mg at 05/25/22 6081    Laboratory Results:  Results from last 7 days   Lab Units 05/25/22  0520 05/24/22  0522 05/23/22  1647 05/18/22  0936   WBC Thousand/uL 3 74* 3 75*  --  4 56   HEMOGLOBIN g/dL 9 9* 10 5*  --  10 9*   HEMATOCRIT % 31 9* 33 4*  --  34 4*   PLATELETS Thousands/uL 178 179  --  171   SODIUM mmol/L 143 142 139 140   POTASSIUM mmol/L 3 8 3 8 3 7 4 2   CHLORIDE mmol/L 110* 109* 105 107   CO2 mmol/L 29 29 26 28   BUN mg/dL 34* 39* 31* 40*   CREATININE mg/dL 2 00* 2 33* 2 43* 2 40*   CALCIUM mg/dL 9 1 9 3 9 0 9 2   MAGNESIUM mg/dL 2 3  --   --   --

## 2022-05-25 NOTE — PLAN OF CARE
Problem: CARDIOVASCULAR - ADULT  Goal: Maintains optimal cardiac output and hemodynamic stability  Description: INTERVENTIONS:  - Monitor I/O, vital signs and rhythm  - Monitor for S/S and trends of decreased cardiac output  - Administer and titrate ordered vasoactive medications to optimize hemodynamic stability  - Assess quality of pulses, skin color and temperature  - Assess for signs of decreased coronary artery perfusion  - Instruct patient to report change in severity of symptoms  Outcome: Progressing  Goal: Absence of cardiac dysrhythmias or at baseline rhythm  Description: INTERVENTIONS:  - Continuous cardiac monitoring, vital signs, obtain 12 lead EKG if ordered  - Administer antiarrhythmic and heart rate control medications as ordered  - Monitor electrolytes and administer replacement therapy as ordered  Outcome: Progressing     Problem: SKIN/TISSUE INTEGRITY - ADULT  Goal: Skin Integrity remains intact(Skin Breakdown Prevention)  Description: Assess:  -Perform [place] assessment every   -Clean and moisturize skin every   -Inspect skin when repositioning, toileting, and assisting with ADLS  -Assess under medical devices such as  every   -Assess extremities for adequate circulation and sensation     Bed Management:  -Have minimal linens on bed & keep smooth, unwrinkled  -Change linens as needed when moist or perspiring  -Avoid sitting or lying in one position for more than  hours while in bed  -Keep HOB at degrees     Toileting:  -Offer bedside commode  -Assess for incontinence every   -Use incontinent care products after each incontinent episode such as     Activity:  -Mobilize patient  times a day  -Encourage activity and walks on unit  -Encourage or provide ROM exercises   -Turn and reposition patient every  Hours  -Use appropriate equipment to lift or move patient in bed  -Instruct/ Assist with weight shifting every  when out of bed in chair  -Consider limitation of chair time  hour intervals    Skin Care:  -Avoid use of baby powder, tape, friction and shearing, hot water or constrictive clothing  -Relieve pressure over bony prominences using   -Do not massage red bony areas    Next Steps:  -Teach patient strategies to minimize risks such as    -Consider consults to  interdisciplinary teams such as   Outcome: Progressing  Goal: Incision(s), wounds(s) or drain site(s) healing without S/S of infection  Description: INTERVENTIONS  - Assess and document dressing, incision, wound bed, drain sites and surrounding tissue  - Provide patient and family education  - Perform skin care/dressing changes every  Outcome: Progressing     Problem: INFECTION - ADULT  Goal: Absence or prevention of progression during hospitalization  Description: INTERVENTIONS:  - Assess and monitor for signs and symptoms of infection  - Monitor lab/diagnostic results  - Monitor all insertion sites, i e  indwelling lines, tubes, and drains  - Monitor endotracheal if appropriate and nasal secretions for changes in amount and color  - [place] appropriate cooling/warming therapies per order  - Administer medications as ordered  - Instruct and encourage patient and family to use good hand hygiene technique  - Identify and instruct in appropriate isolation precautions for identified infection/condition  Outcome: Progressing  Goal: Absence of fever/infection during neutropenic period  Description: INTERVENTIONS:  - Monitor WBC    Outcome: Progressing

## 2022-05-25 NOTE — NURSING NOTE
Patient given all discharge instructions  Patient states that he has no questions at this time  Patient left with all belongings

## 2022-05-26 ENCOUNTER — TRANSITIONAL CARE MANAGEMENT (OUTPATIENT)
Dept: FAMILY MEDICINE CLINIC | Facility: CLINIC | Age: 77
End: 2022-05-26

## 2022-05-26 ENCOUNTER — PATIENT OUTREACH (OUTPATIENT)
Dept: FAMILY MEDICINE CLINIC | Facility: CLINIC | Age: 77
End: 2022-05-26

## 2022-05-27 DIAGNOSIS — E79.0 HYPERURICEMIA: ICD-10-CM

## 2022-05-27 RX ORDER — ALLOPURINOL 100 MG/1
100 TABLET ORAL DAILY
Qty: 90 TABLET | Refills: 3 | Status: SHIPPED | OUTPATIENT
Start: 2022-05-27

## 2022-05-28 ENCOUNTER — APPOINTMENT (OUTPATIENT)
Dept: LAB | Facility: MEDICAL CENTER | Age: 77
End: 2022-05-28
Payer: MEDICARE

## 2022-05-28 DIAGNOSIS — N18.4 STAGE 4 CHRONIC KIDNEY DISEASE (HCC): ICD-10-CM

## 2022-05-28 LAB
ANION GAP SERPL CALCULATED.3IONS-SCNC: 2 MMOL/L (ref 4–13)
BUN SERPL-MCNC: 30 MG/DL (ref 5–25)
CALCIUM SERPL-MCNC: 9.6 MG/DL (ref 8.3–10.1)
CHLORIDE SERPL-SCNC: 106 MMOL/L (ref 100–108)
CO2 SERPL-SCNC: 33 MMOL/L (ref 21–32)
CREAT SERPL-MCNC: 2.19 MG/DL (ref 0.6–1.3)
GFR SERPL CREATININE-BSD FRML MDRD: 27 ML/MIN/1.73SQ M
GLUCOSE P FAST SERPL-MCNC: 137 MG/DL (ref 65–99)
POTASSIUM SERPL-SCNC: 4.7 MMOL/L (ref 3.5–5.3)
SODIUM SERPL-SCNC: 141 MMOL/L (ref 136–145)

## 2022-05-28 PROCEDURE — 36415 COLL VENOUS BLD VENIPUNCTURE: CPT

## 2022-05-28 PROCEDURE — 80048 BASIC METABOLIC PNL TOTAL CA: CPT

## 2022-06-01 ENCOUNTER — OFFICE VISIT (OUTPATIENT)
Dept: CARDIAC SURGERY | Facility: CLINIC | Age: 77
End: 2022-06-01
Payer: MEDICARE

## 2022-06-01 VITALS
DIASTOLIC BLOOD PRESSURE: 56 MMHG | SYSTOLIC BLOOD PRESSURE: 126 MMHG | RESPIRATION RATE: 18 BRPM | BODY MASS INDEX: 23.34 KG/M2 | HEIGHT: 70 IN | WEIGHT: 163 LBS | HEART RATE: 60 BPM | OXYGEN SATURATION: 99 %

## 2022-06-01 DIAGNOSIS — I65.23 ASYMPTOMATIC BILATERAL CAROTID ARTERY STENOSIS: ICD-10-CM

## 2022-06-01 DIAGNOSIS — I35.0 AORTIC STENOSIS, SEVERE: Primary | ICD-10-CM

## 2022-06-01 DIAGNOSIS — I77.9 AORTO-ILIAC DISEASE (HCC): ICD-10-CM

## 2022-06-01 PROCEDURE — 99215 OFFICE O/P EST HI 40 MIN: CPT | Performed by: PHYSICIAN ASSISTANT

## 2022-06-01 RX ORDER — CHLORHEXIDINE GLUCONATE 0.12 MG/ML
15 RINSE ORAL ONCE
Status: CANCELLED | OUTPATIENT
Start: 2022-06-01 | End: 2022-06-01

## 2022-06-01 RX ORDER — CEFAZOLIN SODIUM 2 G/50ML
2000 SOLUTION INTRAVENOUS ONCE
Status: CANCELLED | OUTPATIENT
Start: 2022-06-01 | End: 2022-06-01

## 2022-06-01 NOTE — H&P
Consultation - Cardiothoracic Surgery   Kevon Meckel  76 y o  male MRN: [id number]      Reason for Consult / Principal Problem: Aortic stenosis, Non-Rheumatic    History of Present Illness: Kevon Meckel  is a 76y o  year old male who was previously evaluated in our office by PATY Kaufman  for transcatheter aortic valve replacement  During this initial consultation, arrangements were made for the following studies to be completed: gated CTA of the chest, abdomen, and pelvis and cardiac catheterization  Kevon Meckel  now presents to review the results of these tests and confirm the suitability of proceeding with transcatheter aortic valve replacment  The patient of note has worsening aortic stenosis  He did have recommendations to have an TAVR evaluation previously but did decline  He has an admission around April 1 when he was seen in the hospital but our service  His PMH is notable for previous CABG x 2 9/2013 by Dr Chiquis Higuera, >70% carotid stenosis bilaterally, severe aorto-iliac disease, previous left lower extremity vascular procedures, CKD 4 and DM2  He returns to the office today after testing for his TAVR preoperative evaluation including catheterization necessitating a BURAK/PCI and CTA TAVR  He is here on examination today alone  He does live alone and tolerate his ADLs independently  His symptoms are noted to be lower extremity edema, SOB and dizziness with exertion       Past Medical History:  Past Medical History:   Diagnosis Date    CAD (coronary artery disease)     Carotid stenosis, asymptomatic, bilateral     Chronic kidney disease     Diabetes ([facility] [address] )     type 2, non-insulin dependent    GERD (gastroesophageal reflux disease)     History of nephrolithiasis     Hyperlipidemia     Hypertension     [facility])     Severe aortic stenosis      Past Surgical History:   Past Surgical History:   Procedure Laterality Date  APPENDECTOMY      CARDIAC CATHETERIZATION N/A 5/5/2022    Procedure: CARDIAC RHC/LHC; Surgeon: Moon Benton DO;  [facility]; Service: Cardiology    CARDIAC CATHETERIZATION N/A 5/5/2022    Procedure: Cardiac Coronary Angiogram;  Surgeon: Moon Benton DO;  [facility]; Service: Cardiology    CARDIAC CATHETERIZATION N/A 5/24/2022    Procedure: Cardiac pci;  Surgeon: Ermias Roberts MD;  [facility]; Service: Cardiology    COLECTOMY      COLONOSCOPY  2013    CORONARY ARTERY BYPASS GRAFT  2013    X 2    FEMORAL ARTERY - POPLITEAL ARTERY BYPASS GRAFT      HEMORRHOID SURGERY      IR AORTAGRAM WITH RUN-OFF  11/19/2018    [facility] SL[facility] CATHJ 3RD+ ORD SLCTV ABDL PEL/LXTR [facility] Left 8/12/2016    Procedure: LEFT FEMORAL ARTERIOGRAM; BALLOON ANGIOPLASTY; SFA  AND FEMORAL AT VEIN GRAFT;  Surgeon: Yana August MD;  Location: [facility]IN [facility];  Service: Vascular    TONSILLECTOMY AND ADENOIDECTOMY       Family History:  Family History   Problem Relation Age of Onset    Heart attack Father     Other Sister         bypass and vlave replacement    Stroke Paternal Uncle     Arrhythmia Neg Hx     Asthma Neg Hx     Clotting disorder Neg Hx     Fainting Neg Hx     Anuerysm Neg Hx     Hypertension Neg Hx         unsure     Hyperlipidemia Neg Hx     Heart failure Neg Hx      Social History:    Social History     Substance and Sexual Activity   Alcohol Use Not Currently    Comment: occasional     Social History     Substance and Sexual Activity   Drug Use No     Social History     Tobacco Use   Smoking Status Current Every Day Smoker    Packs/day: 0 25    Years: 50 00    Pack years: 12 50    Types: Cigarettes   Smokeless Tobacco Never Used   Tobacco Comment    4-5 cigarettes daily        Home Medications:   Prior to Admission medications    Medication Sig Start Date End Date Taking?  Authorizing Provider   allopurinol (ZYLOPRIM) 100 mg tablet Take 1 tablet (100 mg total) by mouth daily 5/27/22   Derik Pruitt MD   amLODIPine (NORVASC) 10 mg tablet TAKE 1 TABLET DAILY 12/13/21   Veronica Lockett MD   AMYLASE-LIPASE-PROTEASE PO Take by mouth     Historical Provider, MD   aspirin (ECOTRIN LOW STRENGTH) 81 mg EC tablet Take 81 mg by mouth daily    Historical Provider, MD   atorvastatin (LIPITOR) 80 mg tablet TAKE 1 TABLET DAILY AT     BEDTIME 2/23/22   Veronica Lockett MD   clopidogrel (PLAVIX) 75 mg tablet Take 1 tablet (75 mg total) by mouth in the morning  5/25/22   Miguel Boas, CRNP   Cyanocobalamin (VITAMIN B12 PO) Take by mouth once a week      Historical Provider, MD   Magnesium Oxide (MAG-200 PO) Take by mouth once a week     Historical Provider, MD   Multiple Vitamin (MULTIVITAMIN) tablet Take 1 tablet by mouth daily 1-2 times a week     Historical Provider, MD   pantoprazole (PROTONIX) 40 mg tablet Take 1 tablet (40 mg total) by mouth daily 12/27/21   Radha Olguin PA-C   torsemide (DEMADEX) 20 mg tablet Take 1 tablet (20 mg total) by mouth 2 (two) times a day 3/31/22   ZHANNA Whitmore       Allergies:  No Known Allergies    Review of Systems:     Review of Systems   Constitutional: Positive for activity change and fatigue  HENT: Negative  Eyes: Negative  Respiratory: Positive for shortness of breath  Cardiovascular: Positive for leg swelling  Gastrointestinal: Negative  Endocrine: Negative  Genitourinary: Negative  Musculoskeletal: Negative  Skin: Negative  Allergic/Immunologic: Negative  Neurological: Positive for dizziness and light-headedness  Hematological: Negative  Psychiatric/Behavioral: Negative          Vital Signs:   6/1/22 10:26 AM 6/1/22 10:30 AM BP   135/62   126/56 BP Location   Left arm   Right arm Patient Position   Sitting   Sitting Cuff Size   Standard Pulse   60 Resp   18 SpO2   99% Weight   73 9 kg (163 lb) Height   5' 10" (1 778 m) Pain Score   0-No pain     Physical Exam:     Physical Exam  Constitutional:       General: He is not in acute distress  Appearance: He is normal weight  He is not toxic-appearing  HENT:      Head: Normocephalic and atraumatic  Right Ear: External ear normal       Left Ear: External ear normal       Nose: Nose normal       Mouth/Throat:      Mouth: Mucous membranes are moist       Pharynx: Oropharynx is clear  No oropharyngeal exudate  Eyes:      General: No scleral icterus  Right eye: No discharge  Left eye: No discharge  Extraocular Movements: Extraocular movements intact  Conjunctiva/sclera: Conjunctivae normal       Pupils: Pupils are equal, round, and reactive to light  Neck:      Vascular: Carotid bruit present  Cardiovascular:      Rate and Rhythm: Normal rate  Pulses: Normal pulses  Heart sounds: Murmur heard  Comments: III/VI systolic murmur radiating to carotids   Pulmonary:      Effort: Pulmonary effort is normal  No respiratory distress  Breath sounds: Normal breath sounds  No wheezing or rales  Abdominal:      General: Abdomen is flat  Bowel sounds are normal  There is no distension  Palpations: Abdomen is soft  Tenderness: There is no abdominal tenderness  There is no guarding  Musculoskeletal:      Cervical back: Normal range of motion  Right lower leg: Edema present  Left lower leg: Edema present  Skin:     General: Skin is warm and dry  Coloration: Skin is not pale  Findings: No erythema or rash  Neurological:      General: No focal deficit present  Mental Status: He is alert and oriented to person, place, and time  Cranial Nerves: No cranial nerve deficit  Sensory: No sensory deficit  Motor: No weakness  Psychiatric:         Mood and Affect: Mood normal          Behavior: Behavior normal          Thought Content:  Thought content normal          Judgment: Judgment normal          Lab Results:         Results from last 7 days   Lab Units 05/28/22  0846   POTASSIUM mmol/L 4 7   CHLORIDE mmol/L 106   CO2 mmol/L 33*   BUN mg/dL 30*   CREATININE mg/dL 2 19*   CALCIUM mg/dL 9 6         Lab Results   Component Value Date    HGBA1C 5 6 03/23/2022     No results found for: CKTOTAL, CKMB, CKMBINDEX, TROPONINI    Imaging Studies:     Echocardiogram:    Findings    Left Ventricle Left ventricular cavity size is normal  Wall thickness is mildly increased  There is mild concentric hypertrophy  The left ventricular ejection fraction is 50%  Systolic function is low normal   There is moderate hypokinesis of the basal to mid inferior wall  Diastolic function is moderately abnormal, consistent with grade II (pseudonormal) relaxation  Right Ventricle Right ventricular cavity size is normal  Systolic function is normal  Wall thickness is normal    Left Atrium The atrium is mildly dilated  Right Atrium The atrium is normal in size  Aortic Valve The aortic valve is trileaflet  The leaflets are severely thickened  The leaflets are severely calcified  There is severely reduced mobility  There is mild regurgitation  There is severe stenosis  Mitral Valve The mitral valve has normal structure and function  There is moderate regurgitation  There is no evidence of stenosis  Tricuspid Valve Tricuspid valve structure is normal  There is trace regurgitation  There is no evidence of stenosis  There is no indirect evidence of pulmonary hypertension  Pulmonic Valve Pulmonic valve structure is normal  There is no evidence of regurgitation  There is no evidence of stenosis  Ascending Aorta The aortic root is normal in size  IVC/SVC The inferior vena cava is dilated  Respirophasic changes were blunted (less than 50% variation)  Pericardium There is no pericardial effusion   The pericardium is normal in appearance         Gated CTA of the chest/abdomen/pelvis:   FINDINGS:     VASCULAR STRUCTURES:       Annulus: diameter 31 0 x 24 6 mm      area: 628 4 sq mm Annulus to LCA: 20 9 mm    Annulus to RCA: 23 2 mm    Minimal diameter right iliofemoral segment: 3 9 mm    Minimal diameter left iliofemoral segment: 2 7 mm     The ascending aorta is nonaneurysmal measuring 36 mm with mild atherosclerosis  There is a type II aortic arch with classic branching anatomy  Mild stenosis at the origin of left subclavian artery  The aortic arch is nonaneurysmal with mild   atherosclerosis  The descending thoracic aorta is nonaneurysmal with mild atherosclerosis      The abdominal aorta is nonaneurysmal with moderate atherosclerotic calcifications  High-grade stenosis at the origin of celiac artery  Mild stenoses at the origins of superior mesenteric and inferior mesenteric arteries  Moderate stenoses at the   origins of bilateral renal arteries  Bilateral iliac arteries are nonaneurysmal with moderate to severe luminal stenoses due to atherosclerosis  Right common femoral artery is nonaneurysmal with diffuse atherosclerotic disease  Aneurysmal dilatation   of distal left common femoral artery measuring 1 7 cm      Cardiac findings: There is moderate calcification of the aortic valve  The left ventricle is normal   The ventricular septum is normal   No prior valvular surgery  Prior bypass surgery  No pericardial effusion  No cardiac mass or thrombus  Coronary artery   calcifications      OTHER FINDINGS:      CHEST     LUNGS:  Emphysematous changes  Bibasilar atelectasis  7 mm left lower lobe lung nodule (series 6 image 59)  There is no tracheal or endobronchial lesion      PLEURA:  Bilateral mild pleural effusions      MEDIASTINUM AND MICHAEL:  Mildly prominent mediastinal lymph nodes, likely reactive      CHEST WALL AND LOWER NECK:   Sternal wires      ABDOMEN     LIVER/BILIARY TREE:  Unremarkable      GALLBLADDER:  Small calcified gallstones   No pericholecystic inflammatory change      SPLEEN:  Unremarkable      PANCREAS:  Atrophic with calcifications      ADRENAL GLANDS: Unremarkable      KIDNEYS/URETERS:  Nonobstructing bilateral renal stones, largest measuring 8 mm in the inferior pole of left kidney  No hydronephrosis      STOMACH AND BOWEL:  Postsurgical changes of prior ascending colon resection      APPENDIX:  Surgically absent      ABDOMINOPELVIC CAVITY:  No ascites or free intraperitoneal air  No lymphadenopathy      PELVIS     REPRODUCTIVE ORGANS:  Unremarkable for patient's age      URINARY BLADDER:  Unremarkable      ABDOMINAL WALL/INGUINAL REGIONS:  Subcentimeter nodules in the lower abdominal subcutaneous tissues may be related to medication injections  Postsurgical changes in the left inguinal region      OSSEOUS STRUCTURES:  No acute fracture or destructive osseous lesion      IMPRESSION:  1  Diffuse atherosclerosis of the vasculature with moderate to severe narrowing of bilateral iliac arteries  2   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  3   7 mm left lower lobe lung nodule  Based on current Fleischner Society 2017 Guidelines on incidental pulmonary nodule, followup non-contrast CT is recommended at 6-12 months from the initial examination and, if stable at that time, an additional   followup is recommended for 18-24 months from the initial examination  4   Mild bilateral pleural effusions with bibasilar atelectasis   5  Status post CABG  6   Nonobstructing bilateral subcentimeter renal stones  7   TAVR measurements as above  cardiac catheterization:   Impression    · Prox RCA lesion is 95% stenosed  · Mid RCA lesion is 50% stenosed  PCI to RCA     Carotid artery ultrasound:   CONCLUSION:     Impression  RIGHT:  There is >70% stenosis noted in the internal carotid artery  Plaque is  heterogenous and irregular  There is a severe stenosis in the external carotid artery  Vertebral artery flow is antegrade  There is no significant subclavian artery  disease       LEFT:  There is >70% stenosis noted in the internal carotid artery  Plaque is  heterogenous and irregular  There is a severe stenosis in the external carotid artery  Vertebral artery flow is antegrade  Monophasic flow in the subclavian artery is  suggestive of more proximal disease  I have personally reviewed pertinent reports  TAVR evaluation Assessment:     5 Meter Walk Test:      Attempt 1: 6   Attempt 2: 7   Attempt 3: 7    STS Risk Score: 4 71 %, mortality risk    NYHC: III    KCCQ-12 was completed    Assessment:    Severe aortic stenosis; Ongoing TAVR workup    Plan:    Brendon Ramos  has severe symptomatic aortic stenosis  Based on their STS risk assessment, they have undergone testing for transcatheter aortic valve replacement  The results of these studies have been interpreted by PATY Finch  who has determined the patient to be an appropriate candidate for transcatheter aortic valve replacement  The risks, benefits, and alternatives to TAVR were discussed in detail with the patient today  They understand and wish to proceed with transfemoral transcatheter aortic valve replacement  Informed consent was obtained and a date for the intervention has been set  Brendon Ramos  was comfortable with our recommendations, and their questions were answered to their satisfaction  Shared decision-making encounter occurred during this visit  Additionally, heart team evaluation of suitability for surgical replacement has been completed  The following preoperative instructions were provided at the conclusion of their consultation:     1  You will receive a phone call from the hospital between 2:00 PM and 8:00 PM the day prior to surgery to confirm arrival time and location  For surgery on Mondays, you will receive a call on Friday  2  Do not drink or eat anything after midnight the night before surgery   That includes no water, candy, gum, lozenges, Lifesavers, etc  We recommend you not eat any "junk" food, consume alcohol or smoke the night before surgery  3  Continue taking aspirin but only 81 mg daily  4  If you take Coumadin and/or Plavix, discontinue it 5 days before surgery  5  If you are diabetic, do not take any of your diabetic pills the morning of surgery  If you take Lantus insulin, you may take it at your regularly scheduled time the day before surgery  Do not take any other insulins the morning of surgery  6  The 2 nights before surgery, take a shower each night using the special antiseptic soap or soap sponges you received from the office or [facility] Snowball your hair with regular shampoo and rinse completely before using the antiseptic sponges  Use the sponge to wash from your neck down, with special attention to the armpits and groin area  Do not use any other soap or cleanser on your skin  Do not use lotions, powder, deodorant or perfume of any kind on your skin after you shower  Use clean bed linens and wear clean pajamas after your shower  7  You will be prescribed Mupirocin nasal ointment  Apply to both nostrils twice a day for 5 days prior to surgery  8  Do not take a shower the morning of her surgery; you'll be given a special" bath" at the hospital   9  Notify the CT surgery office if you develop a cold, sore throat, cough, fever or other health issues before your surgery  10  Other medication changes included the following: continue Plavix  Stop MVI now     SIGNATURE: Josiah Schmid PA-C  DATE: June 1, 2022  TIME: 10:03 AM    * This note was completed in part utilizing m-[id number] direct voice recognition software  Grammatical errors, random word insertion, spelling mistakes, and incomplete sentences may be an occasional consequence of the system secondary to software limitations, ambient noise and hardware issues  At the time of dictation, efforts were made to edit, clarify and /or correct errors  Please read the chart carefully and recognize, using context, where substitutions have occurred  If you have any questions or concerns about the context, text or information contained within the body of this dictation, please contact myself, the provider, for further clarification

## 2022-06-01 NOTE — LETTER
June 1, 2022     Terry Apodaca, [address]  [address] [address]    Patient: Danika Ko  YOB: 1945   Date of Visit: 6/1/2022       Dear Dr Erline Kussmaul:    Thank you for referring Kami Cardenas to me for evaluation  Below are my notes for this consultation  If you have questions, please do not hesitate to call me  I look forward to following your patient along with you  Sincerely,        Nav Giordano,         CC: Evelyn Navarro MD  [address] [facility] LYN Castillo  6/1/2022 11:12 AM  Attested  Consultation - Cardiothoracic Surgery   Danika Ko  76 y o  male MRN: [id number]      Reason for Consult / Principal Problem: Aortic stenosis, Non-Rheumatic    History of Present Illness: Danika Ko  is a 76y o  year old male who was previously evaluated in our office by Bartlett Seip, M D  for transcatheter aortic valve replacement  During this initial consultation, arrangements were made for the following studies to be completed: gated CTA of the chest, abdomen, and pelvis and cardiac catheterization  Danika Ko  now presents to review the results of these tests and confirm the suitability of proceeding with transcatheter aortic valve replacment  The patient of note has worsening aortic stenosis  He did have recommendations to have an TAVR evaluation previously but did decline  He has an admission around April 1 when he was seen in the hospital but our service  His PMH is notable for previous CABG x 2 9/2013 by Dr Kimmie Mckeon, >70% carotid stenosis bilaterally, severe aorto-iliac disease, previous left lower extremity vascular procedures, CKD 4 and DM2  He returns to the office today after testing for his TAVR preoperative evaluation including catheterization necessitating a BURAK/PCI and CTA TAVR  He is here on examination today alone  He does live alone and tolerate his ADLs independently    His symptoms are noted to be lower extremity edema, SOB and dizziness with exertion  Past Medical History:  Past Medical History:   Diagnosis Date    CAD (coronary artery disease)     Carotid stenosis, asymptomatic, bilateral     Chronic kidney disease     Diabetes ([facility] [address] )     type 2, non-insulin dependent    GERD (gastroesophageal reflux disease)     History of nephrolithiasis     Hyperlipidemia     Hypertension     [facility]AD [facility]peripheral artery disease) ([facility])     Severe aortic stenosis      Past Surgical History:   Past Surgical History:   Procedure Laterality Date    APPENDECTOMY      CARDIAC CATHETERIZATION N/A 5/5/2022    Procedure: CARDIAC RHC/LHC; Surgeon: Carolyn Sousa DO;  [facility]; Service: Cardiology    CARDIAC CATHETERIZATION N/A 5/5/2022    Procedure: Cardiac Coronary Angiogram;  Surgeon: Carolyn Sousa DO;  [facility]; Service: Cardiology    CARDIAC CATHETERIZATION N/A 5/24/2022    Procedure: Cardiac pci;  Surgeon: Saqib Flowers MD;  [facility];   Service: Cardiology    COLECTOMY      COLONOSCOPY  2013    CORONARY ARTERY BYPASS GRAFT  2013    X 2    FEMORAL ARTERY - POPLITEAL ARTERY BYPASS GRAFT      HEMORRHOID SURGERY      IR AORTAGRAM WITH RUN-OFF  11/19/2018    ID SLCTV CATHJ 3RD+ ORD SLCTV ABDL PEL/LXTR [facility] Left 8/12/2016    Procedure: LEFT FEMORAL ARTERIOGRAM; BALLOON ANGIOPLASTY; SFA  AND FEMORAL AT VEIN GRAFT;  Surgeon: Normand Gosselin, MD;  Location: [facility] MAIN OR;  Service: Vascular    TONSILLECTOMY AND ADENOIDECTOMY       Family History:  Family History   Problem Relation Age of Onset    Heart attack Father     Other Sister         bypass and vlave replacement    Stroke Paternal Uncle     Arrhythmia Neg Hx     Asthma Neg Hx     Clotting disorder Neg Hx     Fainting Neg Hx     Anuerysm Neg Hx     Hypertension Neg Hx         unsure     Hyperlipidemia Neg Hx     Heart failure Neg Hx      Social History:    Social History     Substance and Sexual Activity   Alcohol Use Not Currently    Comment: occasional     Social History     Substance and Sexual Activity   Drug Use No     Social History     Tobacco Use   Smoking Status Current Every Day Smoker    Packs/day: 0 25    Years: 50 00    Pack years: 12 50    Types: Cigarettes   Smokeless Tobacco Never Used   Tobacco Comment    4-5 cigarettes daily        Home Medications:   Prior to Admission medications    Medication Sig Start Date End Date Taking? Authorizing Provider   allopurinol (ZYLOPRIM) 100 mg tablet Take 1 tablet (100 mg total) by mouth daily 5/27/22   Danelle Mtz MD   amLODIPine (NORVASC) 10 mg tablet TAKE 1 TABLET DAILY 12/13/21   Iesha Bustamante MD   AMYLASE-LIPASE-PROTEASE PO Take by mouth     Historical Provider, MD   aspirin (ECOTRIN LOW STRENGTH) 81 mg EC tablet Take 81 mg by mouth daily    Historical Provider, MD   atorvastatin (LIPITOR) 80 mg tablet TAKE 1 TABLET DAILY AT     BEDTIME 2/23/22   Iesha Bustamante MD   clopidogrel (PLAVIX) 75 mg tablet Take 1 tablet (75 mg total) by mouth in the morning  5/25/22   ZHANNA Szymanski   Cyanocobalamin (VITAMIN B12 PO) Take by mouth once a week      Historical Provider, MD   Magnesium Oxide (MAG-200 PO) Take by mouth once a week     Historical Provider, MD   Multiple Vitamin (MULTIVITAMIN) tablet Take 1 tablet by mouth daily 1-2 times a week     Historical Provider, MD   pantoprazole (PROTONIX) 40 mg tablet Take 1 tablet (40 mg total) by mouth daily 12/27/21   Agnieszka Antonio PA-C   torsemide (DEMADEX) 20 mg tablet Take 1 tablet (20 mg total) by mouth 2 (two) times a day 3/31/22   ZHANNA Mccann       Allergies:  No Known Allergies    Review of Systems:     Review of Systems   Constitutional: Positive for activity change and fatigue  HENT: Negative  Eyes: Negative  Respiratory: Positive for shortness of breath  Cardiovascular: Positive for leg swelling  Gastrointestinal: Negative  Endocrine: Negative  Genitourinary: Negative  Musculoskeletal: Negative  Skin: Negative  Allergic/Immunologic: Negative  Neurological: Positive for dizziness and light-headedness  Hematological: Negative  Psychiatric/Behavioral: Negative  Vital Signs:   6/1/22 10:26 AM 6/1/22 10:30 AM BP   135/62   126/56 BP Location   Left arm   Right arm Patient Position   Sitting   Sitting Cuff Size   Standard Pulse   60 Resp   18 SpO2   99% Weight   73 9 kg (163 lb) Height   5' 10" (1 778 m) Pain Score   0-No pain     Physical Exam:     Physical Exam  Constitutional:       General: He is not in acute distress  Appearance: He is normal weight  He is not toxic-appearing  HENT:      Head: Normocephalic and atraumatic  Right Ear: External ear normal       Left Ear: External ear normal       Nose: Nose normal       Mouth/Throat:      Mouth: Mucous membranes are moist       Pharynx: Oropharynx is clear  No oropharyngeal exudate  Eyes:      General: No scleral icterus  Right eye: No discharge  Left eye: No discharge  Extraocular Movements: Extraocular movements intact  Conjunctiva/sclera: Conjunctivae normal       Pupils: Pupils are equal, round, and reactive to light  Neck:      Vascular: Carotid bruit present  Cardiovascular:      Rate and Rhythm: Normal rate  Pulses: Normal pulses  Heart sounds: Murmur heard  Comments: III/VI systolic murmur radiating to carotids   Pulmonary:      Effort: Pulmonary effort is normal  No respiratory distress  Breath sounds: Normal breath sounds  No wheezing or rales  Abdominal:      General: Abdomen is flat  Bowel sounds are normal  There is no distension  Palpations: Abdomen is soft  Tenderness: There is no abdominal tenderness  There is no guarding  Musculoskeletal:      Cervical back: Normal range of motion  Right lower leg: Edema present        Left lower leg: Edema present  Skin:     General: Skin is warm and dry  Coloration: Skin is not pale  Findings: No erythema or rash  Neurological:      General: No focal deficit present  Mental Status: He is alert and oriented to person, place, and time  Cranial Nerves: No cranial nerve deficit  Sensory: No sensory deficit  Motor: No weakness  Psychiatric:         Mood and Affect: Mood normal          Behavior: Behavior normal          Thought Content: Thought content normal          Judgment: Judgment normal          Lab Results:         Results from last 7 days   Lab Units 05/28/22  0846   POTASSIUM mmol/L 4 7   CHLORIDE mmol/L 106   CO2 mmol/L 33*   BUN mg/dL 30*   CREATININE mg/dL 2 19*   CALCIUM mg/dL 9 6         Lab Results   Component Value Date    HGBA1C 5 6 03/23/2022     No results found for: CKTOTAL, CKMB, CKMBINDEX, TROPONINI    Imaging Studies:     Echocardiogram:    Findings    Left Ventricle Left ventricular cavity size is normal  Wall thickness is mildly increased  There is mild concentric hypertrophy  The left ventricular ejection fraction is 50%  Systolic function is low normal   There is moderate hypokinesis of the basal to mid inferior wall  Diastolic function is moderately abnormal, consistent with grade II (pseudonormal) relaxation  Right Ventricle Right ventricular cavity size is normal  Systolic function is normal  Wall thickness is normal    Left Atrium The atrium is mildly dilated  Right Atrium The atrium is normal in size  Aortic Valve The aortic valve is trileaflet  The leaflets are severely thickened  The leaflets are severely calcified  There is severely reduced mobility  There is mild regurgitation  There is severe stenosis  Mitral Valve The mitral valve has normal structure and function  There is moderate regurgitation  There is no evidence of stenosis  Tricuspid Valve Tricuspid valve structure is normal  There is trace regurgitation   There is no evidence of stenosis  There is no indirect evidence of pulmonary hypertension  Pulmonic Valve Pulmonic valve structure is normal  There is no evidence of regurgitation  There is no evidence of stenosis  Ascending Aorta The aortic root is normal in size  IVC/SVC The inferior vena cava is dilated  Respirophasic changes were blunted (less than 50% variation)  Pericardium There is no pericardial effusion  The pericardium is normal in appearance         Gated CTA of the chest/abdomen/pelvis:   FINDINGS:     VASCULAR STRUCTURES:       Annulus: diameter 31 0 x 24 6 mm      area: 628 4 sq mm    Annulus to LCA: 20 9 mm    Annulus to RCA: 23 2 mm    Minimal diameter right iliofemoral segment: 3 9 mm    Minimal diameter left iliofemoral segment: 2 7 mm     The ascending aorta is nonaneurysmal measuring 36 mm with mild atherosclerosis  There is a type II aortic arch with classic branching anatomy  Mild stenosis at the origin of left subclavian artery  The aortic arch is nonaneurysmal with mild   atherosclerosis  The descending thoracic aorta is nonaneurysmal with mild atherosclerosis      The abdominal aorta is nonaneurysmal with moderate atherosclerotic calcifications  High-grade stenosis at the origin of celiac artery  Mild stenoses at the origins of superior mesenteric and inferior mesenteric arteries  Moderate stenoses at the   origins of bilateral renal arteries  Bilateral iliac arteries are nonaneurysmal with moderate to severe luminal stenoses due to atherosclerosis  Right common femoral artery is nonaneurysmal with diffuse atherosclerotic disease  Aneurysmal dilatation   of distal left common femoral artery measuring 1 7 cm      Cardiac findings: There is moderate calcification of the aortic valve  The left ventricle is normal   The ventricular septum is normal   No prior valvular surgery  Prior bypass surgery  No pericardial effusion  No cardiac mass or thrombus   Coronary artery   calcifications      OTHER FINDINGS:      CHEST     LUNGS:  Emphysematous changes  Bibasilar atelectasis  7 mm left lower lobe lung nodule (series 6 image 59)  There is no tracheal or endobronchial lesion      PLEURA:  Bilateral mild pleural effusions      MEDIASTINUM AND MICHAEL:  Mildly prominent mediastinal lymph nodes, likely reactive      CHEST WALL AND LOWER NECK:   Sternal wires      ABDOMEN     LIVER/BILIARY TREE:  Unremarkable      GALLBLADDER:  Small calcified gallstones  No pericholecystic inflammatory change      SPLEEN:  Unremarkable      PANCREAS:  Atrophic with calcifications      ADRENAL GLANDS:  Unremarkable      KIDNEYS/URETERS:  Nonobstructing bilateral renal stones, largest measuring 8 mm in the inferior pole of left kidney  No hydronephrosis      STOMACH AND BOWEL:  Postsurgical changes of prior ascending colon resection      APPENDIX:  Surgically absent      ABDOMINOPELVIC CAVITY:  No ascites or free intraperitoneal air  No lymphadenopathy      PELVIS     REPRODUCTIVE ORGANS:  Unremarkable for patient's age      URINARY BLADDER:  Unremarkable      ABDOMINAL WALL/INGUINAL REGIONS:  Subcentimeter nodules in the lower abdominal subcutaneous tissues may be related to medication injections  Postsurgical changes in the left inguinal region      OSSEOUS STRUCTURES:  No acute fracture or destructive osseous lesion      IMPRESSION:  1  Diffuse atherosclerosis of the vasculature with moderate to severe narrowing of bilateral iliac arteries  2   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  3   7 mm left lower lobe lung nodule  Based on current Fleischner Society 2017 Guidelines on incidental pulmonary nodule, followup non-contrast CT is recommended at 6-12 months from the initial examination and, if stable at that time, an additional   followup is recommended for 18-24 months from the initial examination  4   Mild bilateral pleural effusions with bibasilar atelectasis   5  Status post CABG    6  Nonobstructing bilateral subcentimeter renal stones  7   TAVR measurements as above  cardiac catheterization:   Impression    · Prox RCA lesion is 95% stenosed  · Mid RCA lesion is 50% stenosed  PCI to RCA     Carotid artery ultrasound:   CONCLUSION:     Impression  RIGHT:  There is >70% stenosis noted in the internal carotid artery  Plaque is  heterogenous and irregular  There is a severe stenosis in the external carotid artery  Vertebral artery flow is antegrade  There is no significant subclavian artery  disease  LEFT:  There is >70% stenosis noted in the internal carotid artery  Plaque is  heterogenous and irregular  There is a severe stenosis in the external carotid artery  Vertebral artery flow is antegrade  Monophasic flow in the subclavian artery is  suggestive of more proximal disease  I have personally reviewed pertinent reports  TAVR evaluation Assessment:     5 Meter Walk Test:      Attempt 1: 6   Attempt 2: 7   Attempt 3: 7    STS Risk Score: 4 71 %, mortality risk    NYHC: III    KCCQ-12 was completed    Assessment:    Severe aortic stenosis; Ongoing TAVR workup    Plan:    Jean Carlos Campoverde  has severe symptomatic aortic stenosis  Based on their STS risk assessment, they have undergone testing for transcatheter aortic valve replacement  The results of these studies have been interpreted by PATY Andersen  who has determined the patient to be an appropriate candidate for transcatheter aortic valve replacement  The risks, benefits, and alternatives to TAVR were discussed in detail with the patient today  They understand and wish to proceed with transfemoral transcatheter aortic valve replacement  Informed consent was obtained and a date for the intervention has been set  Jean Carlos Campoverde  was comfortable with our recommendations, and their questions were answered to their satisfaction  Shared decision-making encounter occurred during this visit  Additionally, heart team evaluation of suitability for surgical replacement has been completed  The following preoperative instructions were provided at the conclusion of their consultation:     1  You will receive a phone call from the hospital between 2:00 PM and 8:00 PM the day prior to surgery to confirm arrival time and location  For surgery on Mondays, you will receive a call on Friday  2  Do not drink or eat anything after midnight the night before surgery  That includes no water, candy, gum, lozenges, Lifesavers, etc  We recommend you not eat any "junk" food, consume alcohol or smoke the night before surgery  3  Continue taking aspirin but only 81 mg daily  4  If you take Coumadin and/or Plavix, discontinue it 5 days before surgery  5  If you are diabetic, do not take any of your diabetic pills the morning of surgery  If you take Lantus insulin, you may take it at your regularly scheduled time the day before surgery  Do not take any other insulins the morning of surgery  6  The 2 nights before surgery, take a shower each night using the special antiseptic soap or soap sponges you received from the office or hospital  Jessica Grant your hair with regular shampoo and rinse completely before using the antiseptic sponges  Use the sponge to wash from your neck down, with special attention to the armpits and groin area  Do not use any other soap or cleanser on your skin  Do not use lotions, powder, deodorant or perfume of any kind on your skin after you shower  Use clean bed linens and wear clean pajamas after your shower  7  You will be prescribed Mupirocin nasal ointment  Apply to both nostrils twice a day for 5 days prior to surgery  8  Do not take a shower the morning of her surgery; you'll be given a special" bath" at the hospital   9  Notify the CT surgery office if you develop a cold, sore throat, cough, fever or other health issues before your surgery    10  Other medication changes included the following: continue Plavix  Stop MVI now     SIGNATURE: Loy Wood PA-C  DATE: June 1, 2022  TIME: 10:03 AM    * This note was completed in part utilizing [employer] fluency direct voice recognition software  Grammatical errors, random word insertion, spelling mistakes, and incomplete sentences may be an occasional consequence of the system secondary to software limitations, ambient noise and hardware issues  At the time of dictation, efforts were made to edit, clarify and /or correct errors  Please read the chart carefully and recognize, using context, where substitutions have occurred  If you have any questions or concerns about the context, text or information contained within the body of this dictation, please contact myself, the provider, for further clarification  Attestation signed by Jesu Villareal DO at 6/1/2022 11:22 AM:  The patient was seen and examined, and I agree with the midlevel's history, physical exam, assessment and plan with the following additions:    Mr Sylvia Carrel seen in the office today after completing his preoperative TAVR testing  His CT scan, cardiac catheterization and echocardiogram reviewed by myself personally and findings shared with him  He has significant peripheral arterial disease and has previously undergone a femoral distal bypass in the left leg  He has significant if iliofemoral bilateral calcification  We discussed treatment options for TAVR  Have recommended transapical approach for TAVR  The risks, benefits, and alternatives to TAVR been discussed  He consents to TA-TAVR

## 2022-06-01 NOTE — PROGRESS NOTES
Consultation - Cardiothoracic Surgery   Michell Jacobo  76 y o  male MRN: [id number]      Reason for Consult / Principal Problem: Aortic stenosis, Non-Rheumatic    History of Present Illness: Michell Jacobo  is a 76y o  year old male who was previously evaluated in our office by PATY Cooney  for transcatheter aortic valve replacement  During this initial consultation, arrangements were made for the following studies to be completed: gated CTA of the chest, abdomen, and pelvis and cardiac catheterization  Michell Jacobo  now presents to review the results of these tests and confirm the suitability of proceeding with transcatheter aortic valve replacment  The patient of note has worsening aortic stenosis  He did have recommendations to have an TAVR evaluation previously but did decline  He has an admission around April 1 when he was seen in the hospital but our service  His PMH is notable for previous CABG x 2 9/2013 by Dr Katina Vora, >70% carotid stenosis bilaterally, severe aorto-iliac disease, previous left lower extremity vascular procedures, CKD 4 and DM2  He returns to the office today after testing for his TAVR preoperative evaluation including catheterization necessitating a BURAK/PCI and CTA TAVR  He is here on examination today alone  He does live alone and tolerate his ADLs independently  His symptoms are noted to be lower extremity edema, SOB and dizziness with exertion       Past Medical History:  Past Medical History:   Diagnosis Date    CAD (coronary artery disease)     Carotid stenosis, asymptomatic, bilateral     Chronic kidney disease     Diabetes ([facility] [address] )     type 2, non-insulin dependent    GERD (gastroesophageal reflux disease)     History of nephrolithiasis     Hyperlipidemia     Hypertension     PAD [facility]peripheral artery disease) ([facility])     Severe aortic stenosis      Past Surgical History:   Past Surgical History:   Procedure Laterality Date  APPENDECTOMY      CARDIAC CATHETERIZATION N/A 5/5/2022    Procedure: CARDIAC RHC/LHC; Surgeon: Juan Martinez DO;  [facility]; Service: Cardiology    CARDIAC CATHETERIZATION N/A 5/5/2022    Procedure: Cardiac Coronary Angiogram;  Surgeon: Juan Martinez DO;  [facility]; Service: Cardiology    CARDIAC CATHETERIZATION N/A 5/24/2022    Procedure: Cardiac pci;  Surgeon: Jigna Lopez MD;  [facility]; Service: Cardiology    COLECTOMY      COLONOSCOPY  2013    CORONARY ARTERY BYPASS GRAFT  2013    X 2    FEMORAL ARTERY - POPLITEAL ARTERY BYPASS GRAFT      HEMORRHOID SURGERY      IR AORTAGRAM WITH RUN-OFF  11/19/2018    ID SLCTV CATHJ 3RD+ ORD SLCTV ABDL PEL/LXTR [facility] Left 8/12/2016    Procedure: LEFT FEMORAL ARTERIOGRAM; BALLOON ANGIOPLASTY; SFA  AND FEMORAL AT VEIN GRAFT;  Surgeon: Silvia Rodriguez MD;  Location: [facility]IN [facility];  Service: Vascular    TONSILLECTOMY AND ADENOIDECTOMY       Family History:  Family History   Problem Relation Age of Onset    Heart attack Father     Other Sister         bypass and vlave replacement    Stroke Paternal Uncle     Arrhythmia Neg Hx     Asthma Neg Hx     Clotting disorder Neg Hx     Fainting Neg Hx     Anuerysm Neg Hx     Hypertension Neg Hx         unsure     Hyperlipidemia Neg Hx     Heart failure Neg Hx      Social History:    Social History     Substance and Sexual Activity   Alcohol Use Not Currently    Comment: occasional     Social History     Substance and Sexual Activity   Drug Use No     Social History     Tobacco Use   Smoking Status Current Every Day Smoker    Packs/day: 0 25    Years: 50 00    Pack years: 12 50    Types: Cigarettes   Smokeless Tobacco Never Used   Tobacco Comment    4-5 cigarettes daily        Home Medications:   Prior to Admission medications    Medication Sig Start Date End Date Taking?  Authorizing Provider   allopurinol (ZYLOPRIM) 100 mg tablet Take 1 tablet (100 mg total) by mouth daily 5/27/22   Reynold Collado MD   amLODIPine (NORVASC) 10 mg tablet TAKE 1 TABLET DAILY 12/13/21   Lex Gill MD   AMYLASE-LIPASE-PROTEASE PO Take by mouth     Historical Provider, MD   aspirin (ECOTRIN LOW STRENGTH) 81 mg EC tablet Take 81 mg by mouth daily    Historical Provider, MD   atorvastatin (LIPITOR) 80 mg tablet TAKE 1 TABLET DAILY AT     BEDTIME 2/23/22   Lex Gill MD   clopidogrel (PLAVIX) 75 mg tablet Take 1 tablet (75 mg total) by mouth in the morning  5/25/22   Deborah AmiraZHANNA singh   Cyanocobalamin (VITAMIN B12 PO) Take by mouth once a week      Historical Provider, MD   Magnesium Oxide (MAG-200 PO) Take by mouth once a week     Historical Provider, MD   Multiple Vitamin (MULTIVITAMIN) tablet Take 1 tablet by mouth daily 1-2 times a week     Historical Provider, MD   pantoprazole (PROTONIX) 40 mg tablet Take 1 tablet (40 mg total) by mouth daily 12/27/21   Selene Verde PA-C   torsemide (DEMADEX) 20 mg tablet Take 1 tablet (20 mg total) by mouth 2 (two) times a day 3/31/22   ZHANNA Esposito       Allergies:  No Known Allergies    Review of Systems:     Review of Systems   Constitutional: Positive for activity change and fatigue  HENT: Negative  Eyes: Negative  Respiratory: Positive for shortness of breath  Cardiovascular: Positive for leg swelling  Gastrointestinal: Negative  Endocrine: Negative  Genitourinary: Negative  Musculoskeletal: Negative  Skin: Negative  Allergic/Immunologic: Negative  Neurological: Positive for dizziness and light-headedness  Hematological: Negative  Psychiatric/Behavioral: Negative          Vital Signs:   6/1/22 10:26 AM 6/1/22 10:30 AM BP   135/62   126/56 BP Location   Left arm   Right arm Patient Position   Sitting   Sitting Cuff Size   Standard Pulse   60 Resp   18 SpO2   99% Weight   73 9 kg (163 lb) Height   5' 10" (1 778 m) Pain Score   0-No pain     Physical Exam:     Physical Exam  Constitutional:       General: He is not in acute distress  Appearance: He is normal weight  He is not toxic-appearing  HENT:      Head: Normocephalic and atraumatic  Right Ear: External ear normal       Left Ear: External ear normal       Nose: Nose normal       Mouth/Throat:      Mouth: Mucous membranes are moist       Pharynx: Oropharynx is clear  No oropharyngeal exudate  Eyes:      General: No scleral icterus  Right eye: No discharge  Left eye: No discharge  Extraocular Movements: Extraocular movements intact  Conjunctiva/sclera: Conjunctivae normal       Pupils: Pupils are equal, round, and reactive to light  Neck:      Vascular: Carotid bruit present  Cardiovascular:      Rate and Rhythm: Normal rate  Pulses: Normal pulses  Heart sounds: Murmur heard  Comments: III/VI systolic murmur radiating to carotids   Pulmonary:      Effort: Pulmonary effort is normal  No respiratory distress  Breath sounds: Normal breath sounds  No wheezing or rales  Abdominal:      General: Abdomen is flat  Bowel sounds are normal  There is no distension  Palpations: Abdomen is soft  Tenderness: There is no abdominal tenderness  There is no guarding  Musculoskeletal:      Cervical back: Normal range of motion  Right lower leg: Edema present  Left lower leg: Edema present  Skin:     General: Skin is warm and dry  Coloration: Skin is not pale  Findings: No erythema or rash  Neurological:      General: No focal deficit present  Mental Status: He is alert and oriented to person, place, and time  Cranial Nerves: No cranial nerve deficit  Sensory: No sensory deficit  Motor: No weakness  Psychiatric:         Mood and Affect: Mood normal          Behavior: Behavior normal          Thought Content:  Thought content normal          Judgment: Judgment normal          Lab Results:         Results from last 7 days   Lab Units 05/28/22  0846   POTASSIUM mmol/L 4 7   CHLORIDE mmol/L 106   CO2 mmol/L 33*   BUN mg/dL 30*   CREATININE mg/dL 2 19*   CALCIUM mg/dL 9 6         Lab Results   Component Value Date    HGBA1C 5 6 03/23/2022     No results found for: CKTOTAL, CKMB, CKMBINDEX, TROPONINI    Imaging Studies:     Echocardiogram:    Findings    Left Ventricle Left ventricular cavity size is normal  Wall thickness is mildly increased  There is mild concentric hypertrophy  The left ventricular ejection fraction is 50%  Systolic function is low normal   There is moderate hypokinesis of the basal to mid inferior wall  Diastolic function is moderately abnormal, consistent with grade II (pseudonormal) relaxation  Right Ventricle Right ventricular cavity size is normal  Systolic function is normal  Wall thickness is normal    Left Atrium The atrium is mildly dilated  Right Atrium The atrium is normal in size  Aortic Valve The aortic valve is trileaflet  The leaflets are severely thickened  The leaflets are severely calcified  There is severely reduced mobility  There is mild regurgitation  There is severe stenosis  Mitral Valve The mitral valve has normal structure and function  There is moderate regurgitation  There is no evidence of stenosis  Tricuspid Valve Tricuspid valve structure is normal  There is trace regurgitation  There is no evidence of stenosis  There is no indirect evidence of pulmonary hypertension  Pulmonic Valve Pulmonic valve structure is normal  There is no evidence of regurgitation  There is no evidence of stenosis  Ascending Aorta The aortic root is normal in size  IVC/SVC The inferior vena cava is dilated  Respirophasic changes were blunted (less than 50% variation)  Pericardium There is no pericardial effusion   The pericardium is normal in appearance         Gated CTA of the chest/abdomen/pelvis:   FINDINGS:     VASCULAR STRUCTURES:       Annulus: diameter 31 0 x 24 6 mm      area: 628 4 sq mm Annulus to LCA: 20 9 mm    Annulus to RCA: 23 2 mm    Minimal diameter right iliofemoral segment: 3 9 mm    Minimal diameter left iliofemoral segment: 2 7 mm     The ascending aorta is nonaneurysmal measuring 36 mm with mild atherosclerosis  There is a type II aortic arch with classic branching anatomy  Mild stenosis at the origin of left subclavian artery  The aortic arch is nonaneurysmal with mild   atherosclerosis  The descending thoracic aorta is nonaneurysmal with mild atherosclerosis      The abdominal aorta is nonaneurysmal with moderate atherosclerotic calcifications  High-grade stenosis at the origin of celiac artery  Mild stenoses at the origins of superior mesenteric and inferior mesenteric arteries  Moderate stenoses at the   origins of bilateral renal arteries  Bilateral iliac arteries are nonaneurysmal with moderate to severe luminal stenoses due to atherosclerosis  Right common femoral artery is nonaneurysmal with diffuse atherosclerotic disease  Aneurysmal dilatation   of distal left common femoral artery measuring 1 7 cm      Cardiac findings: There is moderate calcification of the aortic valve  The left ventricle is normal   The ventricular septum is normal   No prior valvular surgery  Prior bypass surgery  No pericardial effusion  No cardiac mass or thrombus  Coronary artery   calcifications      OTHER FINDINGS:      CHEST     LUNGS:  Emphysematous changes  Bibasilar atelectasis  7 mm left lower lobe lung nodule (series 6 image 59)  There is no tracheal or endobronchial lesion      PLEURA:  Bilateral mild pleural effusions      MEDIASTINUM AND MICHAEL:  Mildly prominent mediastinal lymph nodes, likely reactive      CHEST WALL AND LOWER NECK:   Sternal wires      ABDOMEN     LIVER/BILIARY TREE:  Unremarkable      GALLBLADDER:  Small calcified gallstones   No pericholecystic inflammatory change      SPLEEN:  Unremarkable      PANCREAS:  Atrophic with calcifications      ADRENAL GLANDS: Unremarkable      KIDNEYS/URETERS:  Nonobstructing bilateral renal stones, largest measuring 8 mm in the inferior pole of left kidney  No hydronephrosis      STOMACH AND BOWEL:  Postsurgical changes of prior ascending colon resection      APPENDIX:  Surgically absent      ABDOMINOPELVIC CAVITY:  No ascites or free intraperitoneal air  No lymphadenopathy      PELVIS     REPRODUCTIVE ORGANS:  Unremarkable for patient's age      URINARY BLADDER:  Unremarkable      ABDOMINAL WALL/INGUINAL REGIONS:  Subcentimeter nodules in the lower abdominal subcutaneous tissues may be related to medication injections  Postsurgical changes in the left inguinal region      OSSEOUS STRUCTURES:  No acute fracture or destructive osseous lesion      IMPRESSION:  1  Diffuse atherosclerosis of the vasculature with moderate to severe narrowing of bilateral iliac arteries  2   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  3   7 mm left lower lobe lung nodule  Based on current Fleischner Society 2017 Guidelines on incidental pulmonary nodule, followup non-contrast CT is recommended at 6-12 months from the initial examination and, if stable at that time, an additional   followup is recommended for 18-24 months from the initial examination  4   Mild bilateral pleural effusions with bibasilar atelectasis   5  Status post CABG  6   Nonobstructing bilateral subcentimeter renal stones  7   TAVR measurements as above  cardiac catheterization:   Impression    · Prox RCA lesion is 95% stenosed  · Mid RCA lesion is 50% stenosed  PCI to RCA     Carotid artery ultrasound:   CONCLUSION:     Impression  RIGHT:  There is >70% stenosis noted in the internal carotid artery  Plaque is  heterogenous and irregular  There is a severe stenosis in the external carotid artery  Vertebral artery flow is antegrade  There is no significant subclavian artery  disease       LEFT:  There is >70% stenosis noted in the internal carotid artery  Plaque is  heterogenous and irregular  There is a severe stenosis in the external carotid artery  Vertebral artery flow is antegrade  Monophasic flow in the subclavian artery is  suggestive of more proximal disease  I have personally reviewed pertinent reports  TAVR evaluation Assessment:     5 Meter Walk Test:      Attempt 1: 6   Attempt 2: 7   Attempt 3: 7    STS Risk Score: 4 71 %, mortality risk    NYHC: III    KCCQ-12 was completed    Assessment:    Severe aortic stenosis; Ongoing TAVR workup    Plan:    Liss Ryder  has severe symptomatic aortic stenosis  Based on their STS risk assessment, they have undergone testing for transcatheter aortic valve replacement  The results of these studies have been interpreted by PATY Roberto  who has determined the patient to be an appropriate candidate for transcatheter aortic valve replacement  The risks, benefits, and alternatives to TAVR were discussed in detail with the patient today  They understand and wish to proceed with transfemoral transcatheter aortic valve replacement  Informed consent was obtained and a date for the intervention has been set  Liss Ryder  was comfortable with our recommendations, and their questions were answered to their satisfaction  Shared decision-making encounter occurred during this visit  Additionally, heart team evaluation of suitability for surgical replacement has been completed  The following preoperative instructions were provided at the conclusion of their consultation:     1  You will receive a phone call from the hospital between 2:00 PM and 8:00 PM the day prior to surgery to confirm arrival time and location  For surgery on Mondays, you will receive a call on Friday  2  Do not drink or eat anything after midnight the night before surgery   That includes no water, candy, gum, lozenges, Lifesavers, etc  We recommend you not eat any "junk" food, consume alcohol or smoke the night before surgery  3  Continue taking aspirin but only 81 mg daily  4  If you take Coumadin and/or Plavix, discontinue it 5 days before surgery  5  If you are diabetic, do not take any of your diabetic pills the morning of surgery  If you take Lantus insulin, you may take it at your regularly scheduled time the day before surgery  Do not take any other insulins the morning of surgery  6  The 2 nights before surgery, take a shower each night using the special antiseptic soap or soap sponges you received from the office or hospital  Simón Recioorman your hair with regular shampoo and rinse completely before using the antiseptic sponges  Use the sponge to wash from your neck down, with special attention to the armpits and groin area  Do not use any other soap or cleanser on your skin  Do not use lotions, powder, deodorant or perfume of any kind on your skin after you shower  Use clean bed linens and wear clean pajamas after your shower  7  You will be prescribed Mupirocin nasal ointment  Apply to both nostrils twice a day for 5 days prior to surgery  8  Do not take a shower the morning of her surgery; you'll be given a special" bath" at the hospital   9  Notify the CT surgery office if you develop a cold, sore throat, cough, fever or other health issues before your surgery  10  Other medication changes included the following: continue Plavix  Stop MVI now     SIGNATURE: Lewis Clark PA-C  DATE: June 1, 2022  TIME: 10:03 AM    * This note was completed in part utilizing m-[email or web address]! fluency direct voice recognition software  Grammatical errors, random word insertion, spelling mistakes, and incomplete sentences may be an occasional consequence of the system secondary to software limitations, ambient noise and hardware issues  At the time of dictation, efforts were made to edit, clarify and /or correct errors  Please read the chart carefully and recognize, using context, where substitutions have occurred  If you have any questions or concerns about the context, text or information contained within the body of this dictation, please contact myself, the provider, for further clarification

## 2022-06-01 NOTE — H&P (VIEW-ONLY)
Consultation - Cardiothoracic Surgery   Glenn Maloney  76 y o  male MRN: [id number]      Reason for Consult / Principal Problem: Aortic stenosis, Non-Rheumatic    History of Present Illness: Glenn Maloney  is a 76y o  year old male who was previously evaluated in our office by PATY Peraza  for transcatheter aortic valve replacement  During this initial consultation, arrangements were made for the following studies to be completed: gated CTA of the chest, abdomen, and pelvis and cardiac catheterization  Glenn Maloney  now presents to review the results of these tests and confirm the suitability of proceeding with transcatheter aortic valve replacment  The patient of note has worsening aortic stenosis  He did have recommendations to have an TAVR evaluation previously but did decline  He has an admission around April 1 when he was seen in the hospital but our service  His PMH is notable for previous CABG x 2 9/2013 by Dr Chelsy Burr, >70% carotid stenosis bilaterally, severe aorto-iliac disease, previous left lower extremity vascular procedures, CKD 4 and DM2  He returns to the office today after testing for his TAVR preoperative evaluation including catheterization necessitating a BURAK/PCI and CTA TAVR  He is here on examination today alone  He does live alone and tolerate his ADLs independently  His symptoms are noted to be lower extremity edema, SOB and dizziness with exertion       Past Medical History:  Past Medical History:   Diagnosis Date    CAD (coronary artery disease)     Carotid stenosis, asymptomatic, bilateral     Chronic kidney disease     Diabetes ([facility] [address] )     type 2, non-insulin dependent    GERD (gastroesophageal reflux disease)     History of nephrolithiasis     Hyperlipidemia     Hypertension     PAD (peripheral artery disease) ([facility])     Severe aortic stenosis      Past Surgical History:   Past Surgical History:   Procedure Laterality Date  APPENDECTOMY      CARDIAC CATHETERIZATION N/A 5/5/2022    Procedure: CARDIAC RHC/LHC; Surgeon: Magalie Whaley DO;  [facility]; Service: Cardiology    CARDIAC CATHETERIZATION N/A 5/5/2022    Procedure: Cardiac Coronary Angiogram;  Surgeon: Magalie Whaley DO;  [facility]; Service: Cardiology    CARDIAC CATHETERIZATION N/A 5/24/2022    Procedure: Cardiac pci;  Surgeon: Ang May MD;  [facility]; Service: Cardiology    COLECTOMY      COLONOSCOPY  2013    CORONARY ARTERY BYPASS GRAFT  2013    X 2    FEMORAL ARTERY - POPLITEAL ARTERY BYPASS GRAFT      HEMORRHOID SURGERY      IR AORTAGRAM WITH RUN-OFF  11/19/2018    ID SLCTV CATHJ 3RD+ ORD SLCTV ABDL PEL/LXTR [facility] Left 8/12/2016    Procedure: LEFT FEMORAL ARTERIOGRAM; BALLOON ANGIOPLASTY; SFA  AND FEMORAL AT VEIN GRAFT;  Surgeon: Joceline Garcia MD;  Location: [facility] MAIN [facility];  Service: Vascular    TONSILLECTOMY AND ADENOIDECTOMY       Family History:  Family History   Problem Relation Age of Onset    Heart attack Father     Other Sister         bypass and vlave replacement    Stroke Paternal Uncle     Arrhythmia Neg Hx     Asthma Neg Hx     Clotting disorder Neg Hx     Fainting Neg Hx     Anuerysm Neg Hx     Hypertension Neg Hx         unsure     Hyperlipidemia Neg Hx     Heart failure Neg Hx      Social History:    Social History     Substance and Sexual Activity   Alcohol Use Not Currently    Comment: occasional     Social History     Substance and Sexual Activity   Drug Use No     Social History     Tobacco Use   Smoking Status Current Every Day Smoker    Packs/day: 0 25    Years: 50 00    Pack years: 12 50    Types: Cigarettes   Smokeless Tobacco Never Used   Tobacco Comment    4-5 cigarettes daily        Home Medications:   Prior to Admission medications    Medication Sig Start Date End Date Taking?  Authorizing Provider   allopurinol (ZYLOPRIM) 100 mg tablet Take 1 tablet (100 mg total) by mouth daily 5/27/22   Moise Sorensen MD   amLODIPine (NORVASC) 10 mg tablet TAKE 1 TABLET DAILY 12/13/21   Amadeo Flores MD   AMYLASE-LIPASE-PROTEASE PO Take by mouth     Historical Provider, MD   aspirin (ECOTRIN LOW STRENGTH) 81 mg EC tablet Take 81 mg by mouth daily    Historical Provider, MD   atorvastatin (LIPITOR) 80 mg tablet TAKE 1 TABLET DAILY AT     BEDTIME 2/23/22   Amadeo Flores MD   clopidogrel (PLAVIX) 75 mg tablet Take 1 tablet (75 mg total) by mouth in the morning  5/25/22   ZHANNA Quiroz   Cyanocobalamin (VITAMIN B12 PO) Take by mouth once a week      Historical Provider, MD   Magnesium Oxide (MAG-200 PO) Take by mouth once a week     Historical Provider, MD   Multiple Vitamin (MULTIVITAMIN) tablet Take 1 tablet by mouth daily 1-2 times a week     Historical Provider, MD   pantoprazole (PROTONIX) 40 mg tablet Take 1 tablet (40 mg total) by mouth daily 12/27/21   Sukhi Rodgers PA-C   torsemide (DEMADEX) 20 mg tablet Take 1 tablet (20 mg total) by mouth 2 (two) times a day 3/31/22   ZHANNA Olivas       Allergies:  No Known Allergies    Review of Systems:     Review of Systems   Constitutional: Positive for activity change and fatigue  HENT: Negative  Eyes: Negative  Respiratory: Positive for shortness of breath  Cardiovascular: Positive for leg swelling  Gastrointestinal: Negative  Endocrine: Negative  Genitourinary: Negative  Musculoskeletal: Negative  Skin: Negative  Allergic/Immunologic: Negative  Neurological: Positive for dizziness and light-headedness  Hematological: Negative  Psychiatric/Behavioral: Negative          Vital Signs:   6/1/22 10:26 AM 6/1/22 10:30 AM BP   135/62   126/56 BP Location   Left arm   Right arm Patient Position   Sitting   Sitting Cuff Size   Standard Pulse   60 Resp   18 SpO2   99% Weight   73 9 kg (163 lb) Height   5' 10" (1 778 m) Pain Score   0-No pain     Physical Exam:     Physical Exam  Constitutional:       General: He is not in acute distress  Appearance: He is normal weight  He is not toxic-appearing  HENT:      Head: Normocephalic and atraumatic  Right Ear: External ear normal       Left Ear: External ear normal       Nose: Nose normal       Mouth/Throat:      Mouth: Mucous membranes are moist       Pharynx: Oropharynx is clear  No oropharyngeal exudate  Eyes:      General: No scleral icterus  Right eye: No discharge  Left eye: No discharge  Extraocular Movements: Extraocular movements intact  Conjunctiva/sclera: Conjunctivae normal       Pupils: Pupils are equal, round, and reactive to light  Neck:      Vascular: Carotid bruit present  Cardiovascular:      Rate and Rhythm: Normal rate  Pulses: Normal pulses  Heart sounds: Murmur heard  Comments: III/VI systolic murmur radiating to carotids   Pulmonary:      Effort: Pulmonary effort is normal  No respiratory distress  Breath sounds: Normal breath sounds  No wheezing or rales  Abdominal:      General: Abdomen is flat  Bowel sounds are normal  There is no distension  Palpations: Abdomen is soft  Tenderness: There is no abdominal tenderness  There is no guarding  Musculoskeletal:      Cervical back: Normal range of motion  Right lower leg: Edema present  Left lower leg: Edema present  Skin:     General: Skin is warm and dry  Coloration: Skin is not pale  Findings: No erythema or rash  Neurological:      General: No focal deficit present  Mental Status: He is alert and oriented to person, place, and time  Cranial Nerves: No cranial nerve deficit  Sensory: No sensory deficit  Motor: No weakness  Psychiatric:         Mood and Affect: Mood normal          Behavior: Behavior normal          Thought Content:  Thought content normal          Judgment: Judgment normal          Lab Results:         Results from last 7 days   Lab Units 05/28/22  0846   POTASSIUM mmol/L 4 7   CHLORIDE mmol/L 106   CO2 mmol/L 33*   BUN mg/dL 30*   CREATININE mg/dL 2 19*   CALCIUM mg/dL 9 6         Lab Results   Component Value Date    HGBA1C 5 6 03/23/2022     No results found for: CKTOTAL, CKMB, CKMBINDEX, TROPONINI    Imaging Studies:     Echocardiogram:    Findings    Left Ventricle Left ventricular cavity size is normal  Wall thickness is mildly increased  There is mild concentric hypertrophy  The left ventricular ejection fraction is 50%  Systolic function is low normal   There is moderate hypokinesis of the basal to mid inferior wall  Diastolic function is moderately abnormal, consistent with grade II (pseudonormal) relaxation  Right Ventricle Right ventricular cavity size is normal  Systolic function is normal  Wall thickness is normal    Left Atrium The atrium is mildly dilated  Right Atrium The atrium is normal in size  Aortic Valve The aortic valve is trileaflet  The leaflets are severely thickened  The leaflets are severely calcified  There is severely reduced mobility  There is mild regurgitation  There is severe stenosis  Mitral Valve The mitral valve has normal structure and function  There is moderate regurgitation  There is no evidence of stenosis  Tricuspid Valve Tricuspid valve structure is normal  There is trace regurgitation  There is no evidence of stenosis  There is no indirect evidence of pulmonary hypertension  Pulmonic Valve Pulmonic valve structure is normal  There is no evidence of regurgitation  There is no evidence of stenosis  Ascending Aorta The aortic root is normal in size  IVC/SVC The inferior vena cava is dilated  Respirophasic changes were blunted (less than 50% variation)  Pericardium There is no pericardial effusion   The pericardium is normal in appearance         Gated CTA of the chest/abdomen/pelvis:   FINDINGS:     VASCULAR STRUCTURES:       Annulus: diameter 31 0 x 24 6 mm      area: 628 4 sq mm Annulus to LCA: 20 9 mm    Annulus to RCA: 23 2 mm    Minimal diameter right iliofemoral segment: 3 9 mm    Minimal diameter left iliofemoral segment: 2 7 mm     The ascending aorta is nonaneurysmal measuring 36 mm with mild atherosclerosis  There is a type II aortic arch with classic branching anatomy  Mild stenosis at the origin of left subclavian artery  The aortic arch is nonaneurysmal with mild   atherosclerosis  The descending thoracic aorta is nonaneurysmal with mild atherosclerosis      The abdominal aorta is nonaneurysmal with moderate atherosclerotic calcifications  High-grade stenosis at the origin of celiac artery  Mild stenoses at the origins of superior mesenteric and inferior mesenteric arteries  Moderate stenoses at the   origins of bilateral renal arteries  Bilateral iliac arteries are nonaneurysmal with moderate to severe luminal stenoses due to atherosclerosis  Right common femoral artery is nonaneurysmal with diffuse atherosclerotic disease  Aneurysmal dilatation   of distal left common femoral artery measuring 1 7 cm      Cardiac findings: There is moderate calcification of the aortic valve  The left ventricle is normal   The ventricular septum is normal   No prior valvular surgery  Prior bypass surgery  No pericardial effusion  No cardiac mass or thrombus  Coronary artery   calcifications      OTHER FINDINGS:      CHEST     LUNGS:  Emphysematous changes  Bibasilar atelectasis  7 mm left lower lobe lung nodule (series 6 image 59)  There is no tracheal or endobronchial lesion      PLEURA:  Bilateral mild pleural effusions      MEDIASTINUM AND MICHAEL:  Mildly prominent mediastinal lymph nodes, likely reactive      CHEST WALL AND LOWER NECK:   Sternal wires      ABDOMEN     LIVER/BILIARY TREE:  Unremarkable      GALLBLADDER:  Small calcified gallstones   No pericholecystic inflammatory change      SPLEEN:  Unremarkable      PANCREAS:  Atrophic with calcifications      ADRENAL GLANDS: Unremarkable      KIDNEYS/URETERS:  Nonobstructing bilateral renal stones, largest measuring 8 mm in the inferior pole of left kidney  No hydronephrosis      STOMACH AND BOWEL:  Postsurgical changes of prior ascending colon resection      APPENDIX:  Surgically absent      ABDOMINOPELVIC CAVITY:  No ascites or free intraperitoneal air  No lymphadenopathy      PELVIS     REPRODUCTIVE ORGANS:  Unremarkable for patient's age      URINARY BLADDER:  Unremarkable      ABDOMINAL WALL/INGUINAL REGIONS:  Subcentimeter nodules in the lower abdominal subcutaneous tissues may be related to medication injections  Postsurgical changes in the left inguinal region      OSSEOUS STRUCTURES:  No acute fracture or destructive osseous lesion      IMPRESSION:  1  Diffuse atherosclerosis of the vasculature with moderate to severe narrowing of bilateral iliac arteries  2   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  3   7 mm left lower lobe lung nodule  Based on current Fleischner Society 2017 Guidelines on incidental pulmonary nodule, followup non-contrast CT is recommended at 6-12 months from the initial examination and, if stable at that time, an additional   followup is recommended for 18-24 months from the initial examination  4   Mild bilateral pleural effusions with bibasilar atelectasis   5  Status post CABG  6   Nonobstructing bilateral subcentimeter renal stones  7   TAVR measurements as above  cardiac catheterization:   Impression    · Prox RCA lesion is 95% stenosed  · Mid RCA lesion is 50% stenosed  PCI to RCA     Carotid artery ultrasound:   CONCLUSION:     Impression  RIGHT:  There is >70% stenosis noted in the internal carotid artery  Plaque is  heterogenous and irregular  There is a severe stenosis in the external carotid artery  Vertebral artery flow is antegrade  There is no significant subclavian artery  disease       LEFT:  There is >70% stenosis noted in the internal carotid artery  Plaque is  heterogenous and irregular  There is a severe stenosis in the external carotid artery  Vertebral artery flow is antegrade  Monophasic flow in the subclavian artery is  suggestive of more proximal disease  I have personally reviewed pertinent reports  TAVR evaluation Assessment:     5 Meter Walk Test:      Attempt 1: 6   Attempt 2: 7   Attempt 3: 7    STS Risk Score: 4 71 %, mortality risk    NYHC: III    KCCQ-12 was completed    Assessment:    Severe aortic stenosis; Ongoing TAVR workup    Plan:    Guy Guy  has severe symptomatic aortic stenosis  Based on their STS risk assessment, they have undergone testing for transcatheter aortic valve replacement  The results of these studies have been interpreted by PATY Rivas  who has determined the patient to be an appropriate candidate for transcatheter aortic valve replacement  The risks, benefits, and alternatives to TAVR were discussed in detail with the patient today  They understand and wish to proceed with transfemoral transcatheter aortic valve replacement  Informed consent was obtained and a date for the intervention has been set  Guy Guy  was comfortable with our recommendations, and their questions were answered to their satisfaction  Shared decision-making encounter occurred during this visit  Additionally, heart team evaluation of suitability for surgical replacement has been completed  The following preoperative instructions were provided at the conclusion of their consultation:     1  You will receive a phone call from the hospital between 2:00 PM and 8:00 PM the day prior to surgery to confirm arrival time and location  For surgery on Mondays, you will receive a call on Friday  2  Do not drink or eat anything after midnight the night before surgery   That includes no water, candy, gum, lozenges, Lifesavers, etc  We recommend you not eat any "junk" food, consume alcohol or smoke the night before surgery  3  Continue taking aspirin but only 81 mg daily  4  If you take Coumadin and/or Plavix, discontinue it 5 days before surgery  5  If you are diabetic, do not take any of your diabetic pills the morning of surgery  If you take Lantus insulin, you may take it at your regularly scheduled time the day before surgery  Do not take any other insulins the morning of surgery  6  The 2 nights before surgery, take a shower each night using the special antiseptic soap or soap sponges you received from the office or hospital  [place] your hair with regular shampoo and rinse completely before using the antiseptic sponges  Use the sponge to wash from your neck down, with special attention to the armpits and groin area  Do not use any other soap or cleanser on your skin  Do not use lotions, powder, deodorant or perfume of any kind on your skin after you shower  Use clean bed linens and wear clean pajamas after your shower  7  You will be prescribed Mupirocin nasal ointment  Apply to both nostrils twice a day for 5 days prior to surgery  8  Do not take a shower the morning of her surgery; you'll be given a special" bath" at the hospital   9  Notify the CT surgery office if you develop a cold, sore throat, cough, fever or other health issues before your surgery  10  Other medication changes included the following: continue Plavix  Stop MVI now     SIGNATURE: Kenna Sierra PA-C  DATE: June 1, 2022  TIME: 10:03 AM    * This note was completed in part utilizing [employer] direct voice recognition software  Grammatical errors, random word insertion, spelling mistakes, and incomplete sentences may be an occasional consequence of the system secondary to software limitations, ambient noise and hardware issues  At the time of dictation, efforts were made to edit, clarify and /or correct errors  Please read the chart carefully and recognize, using context, where substitutions have occurred  If you have any questions or concerns about the context, text or information contained within the body of this dictation, please contact myself, the provider, for further clarification

## 2022-06-05 NOTE — ASSESSMENT & PLAN NOTE
Patient to continue  with current cardiac meds to decrease risk of re stenosis  Patient to follow up with cardiology for scheduled appointments to decrease risk for further cardiac problems with appropriate diagnostic testing to reassess cardiac status  Patient had alll questions about this problem answered today   Pt is S/P PCI

## 2022-06-05 NOTE — ASSESSMENT & PLAN NOTE
Patient is stable with current meds and discussed a low carb diet  Pt  recommended to see eye doctor and foot doctor for routine screening as per protocol  Recheck A1C  and Cr in 3 months  Patient questions answered today about this condtion    Lab Results   Component Value Date    HGBA1C 5 6 03/23/2022

## 2022-06-06 ENCOUNTER — TELEMEDICINE (OUTPATIENT)
Dept: FAMILY MEDICINE CLINIC | Facility: CLINIC | Age: 77
End: 2022-06-06
Payer: MEDICARE

## 2022-06-06 DIAGNOSIS — I73.9 PVD (PERIPHERAL VASCULAR DISEASE) (HCC): ICD-10-CM

## 2022-06-06 DIAGNOSIS — I25.10 CORONARY ARTERY DISEASE INVOLVING NATIVE HEART WITHOUT ANGINA PECTORIS, UNSPECIFIED VESSEL OR LESION TYPE: ICD-10-CM

## 2022-06-06 DIAGNOSIS — I10 PRIMARY HYPERTENSION: ICD-10-CM

## 2022-06-06 DIAGNOSIS — E11.22 TYPE 2 DIABETES MELLITUS WITH STAGE 3B CHRONIC KIDNEY DISEASE, WITHOUT LONG-TERM CURRENT USE OF INSULIN (HCC): ICD-10-CM

## 2022-06-06 DIAGNOSIS — F17.219 CIGARETTE NICOTINE DEPENDENCE WITH NICOTINE-INDUCED DISORDER: ICD-10-CM

## 2022-06-06 DIAGNOSIS — N18.32 TYPE 2 DIABETES MELLITUS WITH STAGE 3B CHRONIC KIDNEY DISEASE, WITHOUT LONG-TERM CURRENT USE OF INSULIN (HCC): ICD-10-CM

## 2022-06-06 DIAGNOSIS — I35.0 AORTIC STENOSIS, SEVERE: ICD-10-CM

## 2022-06-06 DIAGNOSIS — K21.9 GASTROESOPHAGEAL REFLUX DISEASE WITHOUT ESOPHAGITIS: Primary | ICD-10-CM

## 2022-06-06 DIAGNOSIS — E78.2 MIXED HYPERLIPIDEMIA: ICD-10-CM

## 2022-06-06 PROCEDURE — 99495 TRANSJ CARE MGMT MOD F2F 14D: CPT | Performed by: FAMILY MEDICINE

## 2022-06-08 ENCOUNTER — TELEPHONE (OUTPATIENT)
Dept: NEPHROLOGY | Facility: CLINIC | Age: 77
End: 2022-06-08

## 2022-06-08 ENCOUNTER — LAB REQUISITION (OUTPATIENT)
Dept: LAB | Facility: HOSPITAL | Age: 77
End: 2022-06-08
Payer: MEDICARE

## 2022-06-08 ENCOUNTER — APPOINTMENT (OUTPATIENT)
Dept: LAB | Facility: MEDICAL CENTER | Age: 77
End: 2022-06-08
Payer: MEDICARE

## 2022-06-08 DIAGNOSIS — I77.0: ICD-10-CM

## 2022-06-08 DIAGNOSIS — I65.23 ASYMPTOMATIC BILATERAL CAROTID ARTERY STENOSIS: ICD-10-CM

## 2022-06-08 DIAGNOSIS — I77.9 AORTO-ILIAC DISEASE (HCC): ICD-10-CM

## 2022-06-08 DIAGNOSIS — I35.0 AORTIC STENOSIS, SEVERE: ICD-10-CM

## 2022-06-08 DIAGNOSIS — I35.0 NODULAR CALCIFIC AORTIC VALVE STENOSIS: ICD-10-CM

## 2022-06-08 DIAGNOSIS — I65.23 BILATERAL CAROTID ARTERY OCCLUSION: ICD-10-CM

## 2022-06-08 DIAGNOSIS — I35.0 NONRHEUMATIC AORTIC (VALVE) STENOSIS: ICD-10-CM

## 2022-06-08 LAB
ABO GROUP BLD: NORMAL
ALBUMIN SERPL BCP-MCNC: 3.4 G/DL (ref 3.5–5)
ALP SERPL-CCNC: 75 U/L (ref 46–116)
ALT SERPL W P-5'-P-CCNC: 18 U/L (ref 12–78)
ANION GAP SERPL CALCULATED.3IONS-SCNC: 6 MMOL/L (ref 4–13)
AST SERPL W P-5'-P-CCNC: 13 U/L (ref 5–45)
BILIRUB SERPL-MCNC: 0.27 MG/DL (ref 0.2–1)
BLD GP AB SCN SERPL QL: NEGATIVE
BUN SERPL-MCNC: 39 MG/DL (ref 5–25)
CALCIUM ALBUM COR SERPL-MCNC: 9.5 MG/DL (ref 8.3–10.1)
CALCIUM SERPL-MCNC: 9 MG/DL (ref 8.3–10.1)
CHLORIDE SERPL-SCNC: 105 MMOL/L (ref 100–108)
CO2 SERPL-SCNC: 29 MMOL/L (ref 21–32)
CREAT SERPL-MCNC: 2.39 MG/DL (ref 0.6–1.3)
GFR SERPL CREATININE-BSD FRML MDRD: 24 ML/MIN/1.73SQ M
GLUCOSE P FAST SERPL-MCNC: 92 MG/DL (ref 65–99)
INR PPP: 1.04 (ref 0.84–1.19)
POTASSIUM SERPL-SCNC: 4.1 MMOL/L (ref 3.5–5.3)
PROT SERPL-MCNC: 6.9 G/DL (ref 6.4–8.2)
PROTHROMBIN TIME: 13.2 SECONDS (ref 11.6–14.5)
RH BLD: POSITIVE
SODIUM SERPL-SCNC: 140 MMOL/L (ref 136–145)
SPECIMEN EXPIRATION DATE: NORMAL

## 2022-06-08 PROCEDURE — 80053 COMPREHEN METABOLIC PANEL: CPT

## 2022-06-08 PROCEDURE — 87081 CULTURE SCREEN ONLY: CPT

## 2022-06-08 PROCEDURE — 86900 BLOOD TYPING SEROLOGIC ABO: CPT | Performed by: PHYSICIAN ASSISTANT

## 2022-06-08 PROCEDURE — 85610 PROTHROMBIN TIME: CPT

## 2022-06-08 PROCEDURE — 36415 COLL VENOUS BLD VENIPUNCTURE: CPT

## 2022-06-08 PROCEDURE — 86901 BLOOD TYPING SEROLOGIC RH(D): CPT | Performed by: PHYSICIAN ASSISTANT

## 2022-06-08 PROCEDURE — 86850 RBC ANTIBODY SCREEN: CPT | Performed by: PHYSICIAN ASSISTANT

## 2022-06-08 NOTE — TELEPHONE ENCOUNTER
Patient with baseline creatinine 1 6-1 8 he was at [address] [place] [address] and underwent cardiac PCI on 05/24  Creatinine since May has been around 2 3-2 4, improved to 2 0 on 05/25 likely due to hydration during PCI  Last creatinine on 06/08 was 2 39  Overall renal function is stable, okay for TAVR from Nephrology side, risk of worsening renal function was discussed with the patient    Plan TAVR on 6/14  Discussed about risk of worsening renal function with any major procedure specially involving contrast   Recommend IV hydration 1 hour prior to TAVR followed by 4-6 hours post procedure with normal saline  Discussed with patient about holding afternoon dose of torsemide the day before the procedure and to hold torsemide on the day of the procedure

## 2022-06-09 LAB — MRSA NOSE QL CULT: NORMAL

## 2022-06-10 NOTE — PRE-PROCEDURE INSTRUCTIONS
Pre-Surgery Instructions:   Medication Instructions    allopurinol (ZYLOPRIM) 100 mg tablet Instructions provided by MD    amLODIPine (NORVASC) 10 mg tablet Instructions provided by MD Casper Smith AMYLASE-LIPASE-PROTEASE PO Instructions provided by MD Casper Smith aspirin (ECOTRIN LOW STRENGTH) 81 mg EC tablet Instructions provided by MD Casper Smith atorvastatin (LIPITOR) 80 mg tablet Instructions provided by MD    clopidogrel (PLAVIX) 75 mg tablet Instructions provided by MD Casper Smith Cyanocobalamin (VITAMIN B12 PO) Instructions provided by MD    Magnesium Oxide (MAG-200 PO) Instructions provided by MD Casper Smith Multiple Vitamin (MULTIVITAMIN) tablet Instructions provided by MD Casper Smith mupirocin (BACTROBAN) 2 % nasal ointment Instructions provided by MD Casper Smith pantoprazole (PROTONIX) 40 mg tablet Instructions provided by MD Casper Smith torsemide (DEMADEX) 20 mg tablet Instructions provided by MD    Pre op,medications and showering instructions reviewed-Patient has hibiclens and instructions from office  Instructed pt to follow Med instructions given from Surgeons office and to call them with any questions or concerns  Colonoscopy and Dental clearance completed  Pt  Verbalized an understanding of all instructions reviewed and offers no concerns at this time

## 2022-06-11 DIAGNOSIS — K21.9 GASTROESOPHAGEAL REFLUX DISEASE, UNSPECIFIED WHETHER ESOPHAGITIS PRESENT: ICD-10-CM

## 2022-06-11 RX ORDER — PANTOPRAZOLE SODIUM 40 MG/1
TABLET, DELAYED RELEASE ORAL
Qty: 90 TABLET | Refills: 1 | Status: SHIPPED | OUTPATIENT
Start: 2022-06-11

## 2022-06-13 ENCOUNTER — ANESTHESIA EVENT (OUTPATIENT)
Dept: PERIOP | Facility: HOSPITAL | Age: 77
DRG: 266 | End: 2022-06-13
Payer: MEDICARE

## 2022-06-13 RX ORDER — HEPARIN SODIUM 1000 [USP'U]/ML
400 INJECTION, SOLUTION INTRAVENOUS; SUBCUTANEOUS ONCE
Status: CANCELLED | OUTPATIENT
Start: 2022-06-14

## 2022-06-14 ENCOUNTER — APPOINTMENT (INPATIENT)
Dept: NON INVASIVE DIAGNOSTICS | Facility: HOSPITAL | Age: 77
DRG: 266 | End: 2022-06-14
Attending: THORACIC SURGERY (CARDIOTHORACIC VASCULAR SURGERY)
Payer: MEDICARE

## 2022-06-14 ENCOUNTER — HOSPITAL ENCOUNTER (INPATIENT)
Facility: HOSPITAL | Age: 77
LOS: 3 days | Discharge: HOME HEALTH | DRG: 266 | End: 2022-06-17
Attending: THORACIC SURGERY (CARDIOTHORACIC VASCULAR SURGERY) | Admitting: THORACIC SURGERY (CARDIOTHORACIC VASCULAR SURGERY)
Payer: MEDICARE

## 2022-06-14 ENCOUNTER — ANESTHESIA (OUTPATIENT)
Dept: PERIOP | Facility: HOSPITAL | Age: 77
DRG: 266 | End: 2022-06-14
Payer: MEDICARE

## 2022-06-14 ENCOUNTER — TRANSITIONAL CARE MANAGEMENT (OUTPATIENT)
Dept: FAMILY MEDICINE CLINIC | Facility: CLINIC | Age: 77
End: 2022-06-14

## 2022-06-14 ENCOUNTER — APPOINTMENT (INPATIENT)
Dept: RADIOLOGY | Facility: HOSPITAL | Age: 77
DRG: 266 | End: 2022-06-14
Payer: MEDICARE

## 2022-06-14 ENCOUNTER — TELEPHONE (OUTPATIENT)
Dept: FAMILY MEDICINE CLINIC | Facility: CLINIC | Age: 77
End: 2022-06-14

## 2022-06-14 DIAGNOSIS — I35.9 NONRHEUMATIC AORTIC VALVE DISORDER: ICD-10-CM

## 2022-06-14 DIAGNOSIS — Z95.2 S/P TAVR (TRANSCATHETER AORTIC VALVE REPLACEMENT): Primary | ICD-10-CM

## 2022-06-14 DIAGNOSIS — Z95.2 S/P TAVR (TRANSCATHETER AORTIC VALVE REPLACEMENT): ICD-10-CM

## 2022-06-14 DIAGNOSIS — I35.0 AORTIC STENOSIS, SEVERE: ICD-10-CM

## 2022-06-14 DIAGNOSIS — I35.0 AORTIC STENOSIS, SEVERE: Primary | ICD-10-CM

## 2022-06-14 DIAGNOSIS — I77.9 AORTO-ILIAC DISEASE (HCC): ICD-10-CM

## 2022-06-14 PROBLEM — N18.32 STAGE 3B CHRONIC KIDNEY DISEASE (HCC): Status: RESOLVED | Noted: 2021-06-01 | Resolved: 2022-06-14

## 2022-06-14 LAB
ANION GAP SERPL CALCULATED.3IONS-SCNC: 3 MMOL/L (ref 4–13)
APTT PPP: 33 SECONDS (ref 23–37)
BASE EXCESS BLDA CALC-SCNC: 1 MMOL/L (ref -2–3)
BASE EXCESS BLDA CALC-SCNC: 3 MMOL/L (ref -2–3)
BUN SERPL-MCNC: 42 MG/DL (ref 5–25)
CA-I BLD-SCNC: 1.13 MMOL/L (ref 1.12–1.32)
CA-I BLD-SCNC: 1.16 MMOL/L (ref 1.12–1.32)
CA-I BLD-SCNC: 1.19 MMOL/L (ref 1.12–1.32)
CA-I BLD-SCNC: 1.24 MMOL/L (ref 1.12–1.32)
CALCIUM SERPL-MCNC: 8.6 MG/DL (ref 8.3–10.1)
CHLORIDE SERPL-SCNC: 108 MMOL/L (ref 100–108)
CO2 SERPL-SCNC: 30 MMOL/L (ref 21–32)
CREAT SERPL-MCNC: 2.49 MG/DL (ref 0.6–1.3)
FIBRINOGEN PPP-MCNC: 431 MG/DL (ref 227–495)
FIO2 GAS DIL.REBREATH: 44 L
GFR SERPL CREATININE-BSD FRML MDRD: 23 ML/MIN/1.73SQ M
GLUCOSE SERPL-MCNC: 126 MG/DL (ref 65–140)
GLUCOSE SERPL-MCNC: 127 MG/DL (ref 65–140)
GLUCOSE SERPL-MCNC: 128 MG/DL (ref 65–140)
GLUCOSE SERPL-MCNC: 138 MG/DL (ref 65–140)
GLUCOSE SERPL-MCNC: 143 MG/DL (ref 65–140)
GLUCOSE SERPL-MCNC: 144 MG/DL (ref 65–140)
GLUCOSE SERPL-MCNC: 145 MG/DL (ref 65–140)
GLUCOSE SERPL-MCNC: 159 MG/DL (ref 65–140)
GLUCOSE SERPL-MCNC: 164 MG/DL (ref 65–140)
GLUCOSE SERPL-MCNC: 188 MG/DL (ref 65–140)
HCO3 BLDA-SCNC: 27.9 MMOL/L (ref 24–30)
HCO3 BLDA-SCNC: 29.3 MMOL/L (ref 24–30)
HCO3 BLDA-SCNC: 29.9 MMOL/L (ref 24–30)
HCO3 BLDA-SCNC: 30.1 MMOL/L (ref 22–28)
HCT VFR BLD AUTO: 33.5 % (ref 36.5–49.3)
HCT VFR BLD CALC: 27 % (ref 36.5–49.3)
HCT VFR BLD CALC: 28 % (ref 36.5–49.3)
HCT VFR BLD CALC: 30 % (ref 36.5–49.3)
HCT VFR BLD CALC: 32 % (ref 36.5–49.3)
HGB BLD-MCNC: 10.6 G/DL (ref 12–17)
HGB BLDA-MCNC: 10.2 G/DL (ref 12–17)
HGB BLDA-MCNC: 10.9 G/DL (ref 12–17)
HGB BLDA-MCNC: 9.2 G/DL (ref 12–17)
HGB BLDA-MCNC: 9.5 G/DL (ref 12–17)
INR PPP: 1.1 (ref 0.84–1.19)
KCT BLD-ACNC: 128 SEC (ref 89–137)
KCT BLD-ACNC: 140 SEC (ref 89–137)
KCT BLD-ACNC: 296 SEC (ref 89–137)
PCO2 BLD: 30 MMOL/L (ref 21–32)
PCO2 BLD: 31 MMOL/L (ref 21–32)
PCO2 BLD: 31 MMOL/L (ref 21–32)
PCO2 BLD: 32 MMOL/L (ref 21–32)
PCO2 BLD: 54.5 MM HG (ref 42–50)
PCO2 BLD: 56.3 MM HG (ref 42–50)
PCO2 BLD: 56.4 MM HG (ref 42–50)
PCO2 BLD: 58.5 MM HG (ref 36–44)
PH BLD: 7.3 [PH] (ref 7.3–7.4)
PH BLD: 7.32 [PH] (ref 7.35–7.45)
PH BLD: 7.32 [PH] (ref 7.3–7.4)
PH BLD: 7.35 [PH] (ref 7.3–7.4)
PLATELET # BLD AUTO: 189 THOUSANDS/UL (ref 149–390)
PMV BLD AUTO: 9.9 FL (ref 8.9–12.7)
PO2 BLD: 206 MM HG (ref 75–129)
PO2 BLD: 58 MM HG (ref 35–45)
PO2 BLD: 63 MM HG (ref 35–45)
PO2 BLD: 63 MM HG (ref 35–45)
POTASSIUM BLD-SCNC: 3.6 MMOL/L (ref 3.5–5.3)
POTASSIUM BLD-SCNC: 3.7 MMOL/L (ref 3.5–5.3)
POTASSIUM BLD-SCNC: 3.7 MMOL/L (ref 3.5–5.3)
POTASSIUM BLD-SCNC: 3.9 MMOL/L (ref 3.5–5.3)
POTASSIUM SERPL-SCNC: 3.8 MMOL/L (ref 3.5–5.3)
POTASSIUM SERPL-SCNC: 3.9 MMOL/L (ref 3.5–5.3)
POTASSIUM SERPL-SCNC: 4.7 MMOL/L (ref 3.5–5.3)
PROTHROMBIN TIME: 13.8 SECONDS (ref 11.6–14.5)
SAO2 % BLD FROM PO2: 100 % (ref 60–85)
SAO2 % BLD FROM PO2: 87 % (ref 60–85)
SAO2 % BLD FROM PO2: 89 % (ref 60–85)
SAO2 % BLD FROM PO2: 90 % (ref 60–85)
SODIUM BLD-SCNC: 140 MMOL/L (ref 136–145)
SODIUM BLD-SCNC: 142 MMOL/L (ref 136–145)
SODIUM BLD-SCNC: 142 MMOL/L (ref 136–145)
SODIUM BLD-SCNC: 143 MMOL/L (ref 136–145)
SODIUM SERPL-SCNC: 141 MMOL/L (ref 136–145)
SPECIMEN SOURCE: ABNORMAL
SPECIMEN SOURCE: NORMAL

## 2022-06-14 PROCEDURE — 33366 TRCATH REPLACE AORTIC VALVE: CPT | Performed by: INTERNAL MEDICINE

## 2022-06-14 PROCEDURE — C1894 INTRO/SHEATH, NON-LASER: HCPCS | Performed by: THORACIC SURGERY (CARDIOTHORACIC VASCULAR SURGERY)

## 2022-06-14 PROCEDURE — 02RF38H REPLACEMENT OF AORTIC VALVE WITH ZOOPLASTIC TISSUE, TRANSAPICAL, PERCUTANEOUS APPROACH: ICD-10-PCS | Performed by: THORACIC SURGERY (CARDIOTHORACIC VASCULAR SURGERY)

## 2022-06-14 PROCEDURE — 85347 COAGULATION TIME ACTIVATED: CPT

## 2022-06-14 PROCEDURE — 99223 1ST HOSP IP/OBS HIGH 75: CPT | Performed by: INTERNAL MEDICINE

## 2022-06-14 PROCEDURE — 80048 BASIC METABOLIC PNL TOTAL CA: CPT | Performed by: PHYSICIAN ASSISTANT

## 2022-06-14 PROCEDURE — 85049 AUTOMATED PLATELET COUNT: CPT | Performed by: PHYSICIAN ASSISTANT

## 2022-06-14 PROCEDURE — 33366 TRCATH REPLACE AORTIC VALVE: CPT | Performed by: THORACIC SURGERY (CARDIOTHORACIC VASCULAR SURGERY)

## 2022-06-14 PROCEDURE — 76377 3D RENDER W/INTRP POSTPROCES: CPT

## 2022-06-14 PROCEDURE — 85018 HEMOGLOBIN: CPT | Performed by: PHYSICIAN ASSISTANT

## 2022-06-14 PROCEDURE — NC001 PR NO CHARGE: Performed by: PHYSICIAN ASSISTANT

## 2022-06-14 PROCEDURE — 93355 ECHO TRANSESOPHAGEAL (TEE): CPT

## 2022-06-14 PROCEDURE — 85730 THROMBOPLASTIN TIME PARTIAL: CPT | Performed by: PHYSICIAN ASSISTANT

## 2022-06-14 PROCEDURE — 84132 ASSAY OF SERUM POTASSIUM: CPT

## 2022-06-14 PROCEDURE — 82330 ASSAY OF CALCIUM: CPT

## 2022-06-14 PROCEDURE — 71045 X-RAY EXAM CHEST 1 VIEW: CPT

## 2022-06-14 PROCEDURE — 86923 COMPATIBILITY TEST ELECTRIC: CPT

## 2022-06-14 PROCEDURE — 84295 ASSAY OF SERUM SODIUM: CPT

## 2022-06-14 PROCEDURE — C1751 CATH, INF, PER/CENT/MIDLINE: HCPCS | Performed by: THORACIC SURGERY (CARDIOTHORACIC VASCULAR SURGERY)

## 2022-06-14 PROCEDURE — 85014 HEMATOCRIT: CPT | Performed by: PHYSICIAN ASSISTANT

## 2022-06-14 PROCEDURE — C1769 GUIDE WIRE: HCPCS | Performed by: THORACIC SURGERY (CARDIOTHORACIC VASCULAR SURGERY)

## 2022-06-14 PROCEDURE — 85384 FIBRINOGEN ACTIVITY: CPT | Performed by: PHYSICIAN ASSISTANT

## 2022-06-14 PROCEDURE — 82803 BLOOD GASES ANY COMBINATION: CPT

## 2022-06-14 PROCEDURE — 82947 ASSAY GLUCOSE BLOOD QUANT: CPT

## 2022-06-14 PROCEDURE — 85014 HEMATOCRIT: CPT

## 2022-06-14 PROCEDURE — 94762 N-INVAS EAR/PLS OXIMTRY CONT: CPT

## 2022-06-14 PROCEDURE — 84132 ASSAY OF SERUM POTASSIUM: CPT | Performed by: PHYSICIAN ASSISTANT

## 2022-06-14 PROCEDURE — 93005 ELECTROCARDIOGRAM TRACING: CPT

## 2022-06-14 PROCEDURE — 82948 REAGENT STRIP/BLOOD GLUCOSE: CPT

## 2022-06-14 PROCEDURE — 85610 PROTHROMBIN TIME: CPT | Performed by: PHYSICIAN ASSISTANT

## 2022-06-14 PROCEDURE — C1781 MESH (IMPLANTABLE): HCPCS | Performed by: THORACIC SURGERY (CARDIOTHORACIC VASCULAR SURGERY)

## 2022-06-14 DEVICE — BARD® PTFE FELT, 2.5 CM X 15.2 CM
Type: IMPLANTABLE DEVICE | Site: HEART | Status: FUNCTIONAL
Brand: BARD® PTFE FELT

## 2022-06-14 DEVICE — VALVE TAVR  SAPIEN 3 CERTITUDE DLV SYS 29MM: Type: IMPLANTABLE DEVICE | Site: HEART | Status: FUNCTIONAL

## 2022-06-14 RX ORDER — LIDOCAINE HYDROCHLORIDE 10 MG/ML
INJECTION, SOLUTION EPIDURAL; INFILTRATION; INTRACAUDAL; PERINEURAL AS NEEDED
Status: DISCONTINUED | OUTPATIENT
Start: 2022-06-14 | End: 2022-06-14

## 2022-06-14 RX ORDER — SODIUM CHLORIDE 9 MG/ML
INJECTION, SOLUTION INTRAVENOUS CONTINUOUS PRN
Status: DISCONTINUED | OUTPATIENT
Start: 2022-06-14 | End: 2022-06-14

## 2022-06-14 RX ORDER — OXYCODONE HYDROCHLORIDE 5 MG/1
2.5 TABLET ORAL EVERY 4 HOURS PRN
Status: DISCONTINUED | OUTPATIENT
Start: 2022-06-14 | End: 2022-06-17 | Stop reason: HOSPADM

## 2022-06-14 RX ORDER — NICARDIPINE HYDROCHLORIDE 2.5 MG/ML
INJECTION INTRAVENOUS
Status: DISPENSED
Start: 2022-06-14 | End: 2022-06-15

## 2022-06-14 RX ORDER — METOPROLOL TARTRATE 5 MG/5ML
2.5 INJECTION INTRAVENOUS EVERY 6 HOURS SCHEDULED
Status: DISCONTINUED | OUTPATIENT
Start: 2022-06-14 | End: 2022-06-15

## 2022-06-14 RX ORDER — FENTANYL CITRATE 50 UG/ML
50 INJECTION, SOLUTION INTRAMUSCULAR; INTRAVENOUS ONCE
Status: DISCONTINUED | OUTPATIENT
Start: 2022-06-14 | End: 2022-06-14

## 2022-06-14 RX ORDER — MAGNESIUM HYDROXIDE 1200 MG/15ML
LIQUID ORAL AS NEEDED
Status: DISCONTINUED | OUTPATIENT
Start: 2022-06-14 | End: 2022-06-14 | Stop reason: HOSPADM

## 2022-06-14 RX ORDER — POTASSIUM CHLORIDE 14.9 MG/ML
20 INJECTION INTRAVENOUS ONCE AS NEEDED
Status: DISCONTINUED | OUTPATIENT
Start: 2022-06-14 | End: 2022-06-16

## 2022-06-14 RX ORDER — POTASSIUM CHLORIDE 14.9 MG/ML
20 INJECTION INTRAVENOUS
Status: DISCONTINUED | OUTPATIENT
Start: 2022-06-14 | End: 2022-06-16

## 2022-06-14 RX ORDER — ATORVASTATIN CALCIUM 80 MG/1
80 TABLET, FILM COATED ORAL
Status: DISCONTINUED | OUTPATIENT
Start: 2022-06-14 | End: 2022-06-17 | Stop reason: HOSPADM

## 2022-06-14 RX ORDER — ONDANSETRON 2 MG/ML
INJECTION INTRAMUSCULAR; INTRAVENOUS AS NEEDED
Status: DISCONTINUED | OUTPATIENT
Start: 2022-06-14 | End: 2022-06-14

## 2022-06-14 RX ORDER — POLYETHYLENE GLYCOL 3350 17 G/17G
17 POWDER, FOR SOLUTION ORAL DAILY
Status: DISCONTINUED | OUTPATIENT
Start: 2022-06-14 | End: 2022-06-17 | Stop reason: HOSPADM

## 2022-06-14 RX ORDER — SODIUM CHLORIDE, SODIUM LACTATE, POTASSIUM CHLORIDE, CALCIUM CHLORIDE 600; 310; 30; 20 MG/100ML; MG/100ML; MG/100ML; MG/100ML
INJECTION, SOLUTION INTRAVENOUS CONTINUOUS PRN
Status: DISCONTINUED | OUTPATIENT
Start: 2022-06-14 | End: 2022-06-14

## 2022-06-14 RX ORDER — HEPARIN SODIUM 1000 [USP'U]/ML
INJECTION, SOLUTION INTRAVENOUS; SUBCUTANEOUS AS NEEDED
Status: DISCONTINUED | OUTPATIENT
Start: 2022-06-14 | End: 2022-06-14

## 2022-06-14 RX ORDER — OXYCODONE HYDROCHLORIDE 5 MG/1
5 TABLET ORAL EVERY 6 HOURS PRN
Status: DISCONTINUED | OUTPATIENT
Start: 2022-06-14 | End: 2022-06-17 | Stop reason: HOSPADM

## 2022-06-14 RX ORDER — AMLODIPINE BESYLATE 5 MG/1
5 TABLET ORAL DAILY
Status: DISCONTINUED | OUTPATIENT
Start: 2022-06-14 | End: 2022-06-14

## 2022-06-14 RX ORDER — INSULIN LISPRO 100 [IU]/ML
1-5 INJECTION, SOLUTION INTRAVENOUS; SUBCUTANEOUS
Status: DISCONTINUED | OUTPATIENT
Start: 2022-06-14 | End: 2022-06-17 | Stop reason: HOSPADM

## 2022-06-14 RX ORDER — AMLODIPINE BESYLATE 10 MG/1
10 TABLET ORAL DAILY
Status: DISCONTINUED | OUTPATIENT
Start: 2022-06-15 | End: 2022-06-17 | Stop reason: HOSPADM

## 2022-06-14 RX ORDER — CLOPIDOGREL BISULFATE 75 MG/1
75 TABLET ORAL DAILY
Status: DISCONTINUED | OUTPATIENT
Start: 2022-06-14 | End: 2022-06-14

## 2022-06-14 RX ORDER — ALLOPURINOL 100 MG/1
100 TABLET ORAL DAILY
Status: DISCONTINUED | OUTPATIENT
Start: 2022-06-14 | End: 2022-06-17 | Stop reason: HOSPADM

## 2022-06-14 RX ORDER — PROPOFOL 10 MG/ML
INJECTION, EMULSION INTRAVENOUS AS NEEDED
Status: DISCONTINUED | OUTPATIENT
Start: 2022-06-14 | End: 2022-06-14

## 2022-06-14 RX ORDER — CHLORHEXIDINE GLUCONATE 0.12 MG/ML
15 RINSE ORAL ONCE
Status: COMPLETED | OUTPATIENT
Start: 2022-06-14 | End: 2022-06-14

## 2022-06-14 RX ORDER — ACETAMINOPHEN 325 MG/1
975 TABLET ORAL EVERY 8 HOURS
Status: DISCONTINUED | OUTPATIENT
Start: 2022-06-14 | End: 2022-06-17 | Stop reason: HOSPADM

## 2022-06-14 RX ORDER — ONDANSETRON 2 MG/ML
4 INJECTION INTRAMUSCULAR; INTRAVENOUS EVERY 6 HOURS PRN
Status: DISCONTINUED | OUTPATIENT
Start: 2022-06-14 | End: 2022-06-17 | Stop reason: HOSPADM

## 2022-06-14 RX ORDER — SODIUM CHLORIDE, SODIUM GLUCONATE, SODIUM ACETATE, POTASSIUM CHLORIDE, MAGNESIUM CHLORIDE, SODIUM PHOSPHATE, DIBASIC, AND POTASSIUM PHOSPHATE .53; .5; .37; .037; .03; .012; .00082 G/100ML; G/100ML; G/100ML; G/100ML; G/100ML; G/100ML; G/100ML
50 INJECTION, SOLUTION INTRAVENOUS CONTINUOUS
Status: DISCONTINUED | OUTPATIENT
Start: 2022-06-14 | End: 2022-06-14

## 2022-06-14 RX ORDER — HEPARIN SODIUM 5000 [USP'U]/ML
5000 INJECTION, SOLUTION INTRAVENOUS; SUBCUTANEOUS EVERY 8 HOURS SCHEDULED
Status: DISCONTINUED | OUTPATIENT
Start: 2022-06-15 | End: 2022-06-17 | Stop reason: HOSPADM

## 2022-06-14 RX ORDER — FENTANYL CITRATE 50 UG/ML
50 INJECTION, SOLUTION INTRAMUSCULAR; INTRAVENOUS
Status: DISCONTINUED | OUTPATIENT
Start: 2022-06-14 | End: 2022-06-14

## 2022-06-14 RX ORDER — FENTANYL CITRATE 50 UG/ML
INJECTION, SOLUTION INTRAMUSCULAR; INTRAVENOUS AS NEEDED
Status: DISCONTINUED | OUTPATIENT
Start: 2022-06-14 | End: 2022-06-14

## 2022-06-14 RX ORDER — CHLORHEXIDINE GLUCONATE 0.12 MG/ML
15 RINSE ORAL 2 TIMES DAILY
Status: DISCONTINUED | OUTPATIENT
Start: 2022-06-14 | End: 2022-06-14

## 2022-06-14 RX ORDER — CLOPIDOGREL BISULFATE 75 MG/1
75 TABLET ORAL DAILY
Status: DISCONTINUED | OUTPATIENT
Start: 2022-06-14 | End: 2022-06-17 | Stop reason: HOSPADM

## 2022-06-14 RX ORDER — AMLODIPINE BESYLATE 10 MG/1
10 TABLET ORAL DAILY
Status: DISCONTINUED | OUTPATIENT
Start: 2022-06-15 | End: 2022-06-14

## 2022-06-14 RX ORDER — ASPIRIN 81 MG/1
81 TABLET, CHEWABLE ORAL DAILY
Status: DISCONTINUED | OUTPATIENT
Start: 2022-06-14 | End: 2022-06-17 | Stop reason: HOSPADM

## 2022-06-14 RX ORDER — HYDROMORPHONE HCL/PF 1 MG/ML
SYRINGE (ML) INJECTION AS NEEDED
Status: DISCONTINUED | OUTPATIENT
Start: 2022-06-14 | End: 2022-06-14

## 2022-06-14 RX ORDER — HYDROMORPHONE HCL/PF 1 MG/ML
0.5 SYRINGE (ML) INJECTION EVERY 2 HOUR PRN
Status: DISCONTINUED | OUTPATIENT
Start: 2022-06-14 | End: 2022-06-17

## 2022-06-14 RX ORDER — CEFAZOLIN SODIUM 2 G/50ML
2000 SOLUTION INTRAVENOUS ONCE
Status: COMPLETED | OUTPATIENT
Start: 2022-06-14 | End: 2022-06-14

## 2022-06-14 RX ORDER — PANTOPRAZOLE SODIUM 40 MG/1
40 TABLET, DELAYED RELEASE ORAL DAILY
Status: DISCONTINUED | OUTPATIENT
Start: 2022-06-14 | End: 2022-06-17 | Stop reason: HOSPADM

## 2022-06-14 RX ORDER — CEFAZOLIN SODIUM 2 G/50ML
2000 SOLUTION INTRAVENOUS EVERY 8 HOURS
Status: COMPLETED | OUTPATIENT
Start: 2022-06-14 | End: 2022-06-15

## 2022-06-14 RX ORDER — PROTAMINE SULFATE 10 MG/ML
INJECTION, SOLUTION INTRAVENOUS AS NEEDED
Status: DISCONTINUED | OUTPATIENT
Start: 2022-06-14 | End: 2022-06-14

## 2022-06-14 RX ORDER — ROCURONIUM BROMIDE 10 MG/ML
INJECTION, SOLUTION INTRAVENOUS AS NEEDED
Status: DISCONTINUED | OUTPATIENT
Start: 2022-06-14 | End: 2022-06-14

## 2022-06-14 RX ORDER — ACETAMINOPHEN 650 MG/1
650 SUPPOSITORY RECTAL EVERY 4 HOURS PRN
Status: DISCONTINUED | OUTPATIENT
Start: 2022-06-14 | End: 2022-06-16

## 2022-06-14 RX ORDER — CALCIUM CHLORIDE 100 MG/ML
1 INJECTION INTRAVENOUS; INTRAVENTRICULAR ONCE
Status: DISCONTINUED | OUTPATIENT
Start: 2022-06-14 | End: 2022-06-14

## 2022-06-14 RX ORDER — BISACODYL 10 MG
10 SUPPOSITORY, RECTAL RECTAL DAILY PRN
Status: DISCONTINUED | OUTPATIENT
Start: 2022-06-14 | End: 2022-06-17 | Stop reason: HOSPADM

## 2022-06-14 RX ADMIN — HYDROMORPHONE HYDROCHLORIDE 0.5 MG: 1 INJECTION, SOLUTION INTRAMUSCULAR; INTRAVENOUS; SUBCUTANEOUS at 17:17

## 2022-06-14 RX ADMIN — ATORVASTATIN CALCIUM 80 MG: 80 TABLET, FILM COATED ORAL at 21:47

## 2022-06-14 RX ADMIN — MUPIROCIN 1 APPLICATION: 20 OINTMENT TOPICAL at 12:13

## 2022-06-14 RX ADMIN — ACETAMINOPHEN 975 MG: 325 TABLET, FILM COATED ORAL at 21:47

## 2022-06-14 RX ADMIN — PROPOFOL 80 MG: 10 INJECTION, EMULSION INTRAVENOUS at 12:49

## 2022-06-14 RX ADMIN — ASPIRIN 81 MG CHEWABLE TABLET 81 MG: 81 TABLET CHEWABLE at 16:07

## 2022-06-14 RX ADMIN — HYDROMORPHONE HYDROCHLORIDE 0.5 MG: 1 INJECTION, SOLUTION INTRAMUSCULAR; INTRAVENOUS; SUBCUTANEOUS at 19:49

## 2022-06-14 RX ADMIN — AMLODIPINE BESYLATE 5 MG: 5 TABLET ORAL at 19:07

## 2022-06-14 RX ADMIN — CEFAZOLIN SODIUM 2000 MG: 2 SOLUTION INTRAVENOUS at 12:50

## 2022-06-14 RX ADMIN — FENTANYL CITRATE 50 MCG: 50 INJECTION INTRAMUSCULAR; INTRAVENOUS at 13:29

## 2022-06-14 RX ADMIN — CEFAZOLIN SODIUM 2000 MG: 2 SOLUTION INTRAVENOUS at 20:53

## 2022-06-14 RX ADMIN — METOROPROLOL TARTRATE 2.5 MG: 5 INJECTION, SOLUTION INTRAVENOUS at 23:28

## 2022-06-14 RX ADMIN — SODIUM CHLORIDE, SODIUM GLUCONATE, SODIUM ACETATE, POTASSIUM CHLORIDE, MAGNESIUM CHLORIDE, SODIUM PHOSPHATE, DIBASIC, AND POTASSIUM PHOSPHATE 50 ML/HR: .53; .5; .37; .037; .03; .012; .00082 INJECTION, SOLUTION INTRAVENOUS at 14:45

## 2022-06-14 RX ADMIN — HYDROMORPHONE HYDROCHLORIDE 0.5 MG: 1 INJECTION, SOLUTION INTRAMUSCULAR; INTRAVENOUS; SUBCUTANEOUS at 14:28

## 2022-06-14 RX ADMIN — FENTANYL CITRATE 50 MCG: 50 INJECTION INTRAMUSCULAR; INTRAVENOUS at 13:32

## 2022-06-14 RX ADMIN — CHLORHEXIDINE GLUCONATE 15 ML: 1.2 SOLUTION ORAL at 12:13

## 2022-06-14 RX ADMIN — INSULIN LISPRO 1 UNITS: 100 INJECTION, SOLUTION INTRAVENOUS; SUBCUTANEOUS at 16:24

## 2022-06-14 RX ADMIN — LIDOCAINE HYDROCHLORIDE 50 MG: 10 INJECTION, SOLUTION EPIDURAL; INFILTRATION; INTRACAUDAL; PERINEURAL at 12:49

## 2022-06-14 RX ADMIN — ROCURONIUM BROMIDE 50 MG: 50 INJECTION INTRAVENOUS at 12:49

## 2022-06-14 RX ADMIN — NICARDIPINE HYDROCHLORIDE 5 MG/HR: 2.5 INJECTION, SOLUTION INTRAVENOUS at 13:37

## 2022-06-14 RX ADMIN — MUPIROCIN 1 APPLICATION: 20 OINTMENT TOPICAL at 20:52

## 2022-06-14 RX ADMIN — CHLORHEXIDINE GLUCONATE 15 ML: 1.2 SOLUTION ORAL at 17:17

## 2022-06-14 RX ADMIN — OXYCODONE HYDROCHLORIDE 5 MG: 5 TABLET ORAL at 16:09

## 2022-06-14 RX ADMIN — HYDROMORPHONE HYDROCHLORIDE 0.5 MG: 1 INJECTION, SOLUTION INTRAMUSCULAR; INTRAVENOUS; SUBCUTANEOUS at 14:58

## 2022-06-14 RX ADMIN — ACETAMINOPHEN 975 MG: 325 TABLET, FILM COATED ORAL at 16:07

## 2022-06-14 RX ADMIN — PROTAMINE SULFATE 20 MG: 10 INJECTION, SOLUTION INTRAVENOUS at 13:58

## 2022-06-14 RX ADMIN — POTASSIUM CHLORIDE 20 MEQ: 14.9 INJECTION, SOLUTION INTRAVENOUS at 20:36

## 2022-06-14 RX ADMIN — SODIUM CHLORIDE, SODIUM LACTATE, POTASSIUM CHLORIDE, AND CALCIUM CHLORIDE: .6; .31; .03; .02 INJECTION, SOLUTION INTRAVENOUS at 12:27

## 2022-06-14 RX ADMIN — METOROPROLOL TARTRATE 2.5 MG: 5 INJECTION, SOLUTION INTRAVENOUS at 17:16

## 2022-06-14 RX ADMIN — NICARDIPINE HYDROCHLORIDE 7 MG/HR: 2.5 INJECTION, SOLUTION INTRAVENOUS at 22:50

## 2022-06-14 RX ADMIN — FENTANYL CITRATE 100 MCG: 50 INJECTION INTRAMUSCULAR; INTRAVENOUS at 12:49

## 2022-06-14 RX ADMIN — HEPARIN SODIUM 10000 UNITS: 1000 INJECTION INTRAVENOUS; SUBCUTANEOUS at 13:45

## 2022-06-14 RX ADMIN — PROTAMINE SULFATE 10 MG: 10 INJECTION, SOLUTION INTRAVENOUS at 13:55

## 2022-06-14 RX ADMIN — SUGAMMADEX 200 MG: 100 INJECTION, SOLUTION INTRAVENOUS at 14:16

## 2022-06-14 RX ADMIN — ONDANSETRON 4 MG: 2 INJECTION INTRAMUSCULAR; INTRAVENOUS at 14:00

## 2022-06-14 RX ADMIN — CLOPIDOGREL BISULFATE 75 MG: 75 TABLET ORAL at 16:07

## 2022-06-14 RX ADMIN — MUPIROCIN 1 APPLICATION: 20 OINTMENT TOPICAL at 16:06

## 2022-06-14 RX ADMIN — SODIUM CHLORIDE: 0.9 INJECTION, SOLUTION INTRAVENOUS at 12:43

## 2022-06-14 RX ADMIN — NICARDIPINE HYDROCHLORIDE 5 MG/HR: 2.5 INJECTION, SOLUTION INTRAVENOUS at 17:49

## 2022-06-14 RX ADMIN — INSULIN LISPRO 1 UNITS: 100 INJECTION, SOLUTION INTRAVENOUS; SUBCUTANEOUS at 21:00

## 2022-06-14 RX ADMIN — SODIUM CHLORIDE: 9 INJECTION, SOLUTION INTRAVENOUS at 13:00

## 2022-06-14 NOTE — OP NOTE
OPERATIVE REPORT  PATIENT NAME: Kingston Hemphill  :  1945  MRN: [id number]  Pt Location: [facility] OR ROOM 02    SURGERY DATE: 2022    SURGEON: Chandra Ornelas DO    CO-SURGEON: Brice Brand MD    ASSISTANT: Luis Enrique Mccormick PA-C    ADDITIONAL ASSISTANT: N/A    PREOPERATIVE DIAGNOSIS: Symptomatic severe aortic stenosis  POSTOPERATIVE DIAGNOSIS: Symptomatic severe aortic stenosis  NYHA Class: 3  CCS Class: 3    PROCEDURE:   Transcatheter aortic valve replacement with a 29 mm Smith KYLER 3 bioprosthetic valve via transapical approach  CARDIOPULMONARY BYPASS TIME: 0 minutes  ANESTHESIA Dr Radha Cooper, general endotracheal anesthesia with transesophageal echocardiogram guidance  INDICATIONS:  The patient is a 76y o  year-old male with clinical and echocardiographic findings consistent with severe aortic stenosis  The patient was evaluated in our heart valve center and was deemed high risk for a conventional aortic valve replacement therefore we recommended the procedure described previously  FINDINGS:  1  Intraoperative transesophageal echocardiogram revealed severe aortic stenosis  2  Final transesophageal echocardiogram demonstrated prosthetic valve with normal function no perivalvular leak  OPERATIVE TECHNIQUE:    The patient was taken to the operating room and placed supine on the operating table  Following the satisfactory induction of general anesthesia and placement of monitoring lines, the patient was prepped and draped in the usual sterile fashion  A time-out procedure was performed  The right common femoral artery and right common femoral vein were accessed percutaneously using Seldinger technique and fluoroscopy  A pigtail catheter was inserted and advanced to the right coronary cusp   Through the right common femoral vein sheath, a balloon-tip temporary pacing catheter was inserted and advanced into the right ventricle and its capture was confirmed  A small left anterior thoracotomy was perform, pericardium was opened, the cardiac apex was located, a pursestring was placed in the apex with 2 crossing sutures of pledgeted 4-0 Prolene  The patient was systemically heparinized  A needle was placed through the apex, a Amplatz extra stiff wire was advanced through the needle into the descending thoracic aorta  The sheath for the delivery system was inserted through the apex and advanced into the left ventricle  A 29 mm KYLER 3 valve delivery system was advanced through the delivery sheath into the left ventricle and configured for deployment  The valve on the delivery system was then advanced and the aortic valve was crossed  The valve was positioned appropriately using a combination of fluoroscopy and transesophageal echocardiogram guidance  During an episode of rapid pacing, balloon deployment of the valve was performed  A second balloon dilation was performed to assure the full expansion of the valve in the LVOT  Following deployment, the position of the valve was confirmed by fluoroscopy and echocardiography and its position appeared appropriate with no perivalvular leak  The valve delivery system was subsequently removed followed by removal of the sheath while the Prolene pursestrings sutures were secured and direct pressure was held  Protamine was administered with normalization of the ACT  Pressure was released, without evidence of active bleeding  The right common femoral artery sheath was removed followed by deployment of a closure device  The right common femoral vein sheath was removed and pressure was held  [facility] Directly assisted in apical exposure, stabilizing the delivery system during deployment, and wound closure  COMPLICATIONS: None    PACKS/TUBES/DRAINS: None  EBL: Minimal     TRANSFUSION: None       SPECIMENS: None      As the attending surgeon, I was present and scrubbed for all critical portions of this procedure  There was no qualified surgical resident available  Sponge, needle and instrument counts were reported as correct by the nursing staff   A cardiology co-surgeon was required as is a CMS requirement    SIGNATURE: Komal Tamez DO  DATE: June 14, 2022  TIME: 2:32 PM

## 2022-06-14 NOTE — ANESTHESIA PROCEDURE NOTES
Procedure Performed: ELANA Anesthesia  Start Time:         Preanesthesia Checklist    Patient identified, IV assessed, risks and benefits discussed, monitors and equipment assessed, procedure being performed at surgeon's request and anesthesia consent obtained  Procedure     Type of ELANA: complete ELANA with interpretation  Images Saved: ultrasound permanent image saved  Physician Requesting Echo: Rao Patterson DO  Location performed: OR  Intubated  Bite block not placed  Heart visualized  Insertion of ELANA Probe:  Atraumatic  Probe Type:  Multiplane  Modalities:  2D only, 3D, color flow mapping, continuous wave Doppler and pulse wave Doppler  Echocardiographic and Doppler Measurements    PREPROCEDURE    LEFT VENTRICLE:  Systolic Function: normal  Ejection Fraction: 55%  RIGHT VENTRICLE:  Systolic Function: normal   Cavity size normal  Hypertrophy present  AORTIC VALVE:  Leaflets: trileaflet  Leaflet motions restricted  Stenosis: severe  Peak Gradient: 30 mmHg  Regurgitation: mild  MITRAL VALVE:  Leaflets: normal  Leaflet Motions: normal  Regurgitation: moderate  Stenosis: none  TRICUSPID VALVE:  Leaflets: normal  Leaflet Motions: normal   Regurgitation: mild  PULMONIC VALVE:  Leaflets: normal           ASCENDING AORTA:  Size:  normal   Dissection not present  AORTIC ARCH:  Size:  normal   dissection not present  Grade 3: atheroma protruding < 0 5 cm into lumen  DESCENDING AORTA:  Size: normal   Dissection not present  Grade 3: atheroma protruding < 0 5 cm into lumen          RIGHT ATRIUM:  Size:  normal  No spontaneous echo contrast     LEFT ATRIUM:  Size: normal  No spontaneous echo contrast     LEFT ATRIAL APPENDAGE:  Size: normal  No spontaneous echo contrast         ATRIAL SEPTUM:  Intra-atrial septal morphology: normal           VENTRICULAR SEPTUM:  Intra-ventricular septum morphology: normal              OTHER FINDINGS:  Pericardium:  normal  Pleural Effusion:  none  POSTPROCEDURE    LEFT VENTRICLE: Unchanged   Ejection Fraction: 55 %  RIGHT VENTRICLE: Unchanged   AORTIC VALVE:   Leaflets: bioprosthetic  Mean Gradient: 3 mmHg  Regurgitation: none  Valve Size: 29 mm  MITRAL VALVE:     Stenosis: mild  TRICUSPID VALVE: Unchanged   PULMONIC VALVE: Unchanged             ATRIA: Unchanged   AORTA: Unchanged   Dissection: Dissection not present  REMOVAL:  Probe Removal: atraumatic  ECHOCARDIOGRAM COMMENTS:  Post-TAVR: LUIS 2 71 cm^2 (VTI), mean gradient 3 mmHg, no PVL  Palmer Jose

## 2022-06-14 NOTE — ANESTHESIA POSTPROCEDURE EVALUATION
Post-Op Assessment Note    CV Status:  Stable  Pain Score: 2    Pain management: adequate     Mental Status:  Alert   Hydration Status:  Stable   PONV Controlled:  None   Airway Patency:  Patent   Two or more mitigation strategies used for obstructive sleep apnea   Post Op Vitals Reviewed: Yes      Staff: Anesthesiologist         No complications documented      BP   145/44   Temp     Pulse 79   Resp 12   SpO2 100

## 2022-06-14 NOTE — ANESTHESIA PROCEDURE NOTES
Introducer/Rhame-Mario  Performed by: Neeta Wheeler MD  Authorized by: Neeta Wheeler MD     Date/Time: 6/14/2022 2:08 PM  Consent: Verbal consent obtained  Written consent obtained  Risks and benefits: risks, benefits and alternatives were discussed  Consent given by: patient  Patient understanding: patient states understanding of the procedure being performed  Patient consent: the patient's understanding of the procedure matches consent given  Procedure consent: procedure consent matches procedure scheduled  Required items: required blood products, implants, devices, and special equipment available  Patient identity confirmed: verbally with patient, arm band and provided demographic data  Indications: vascular access and central pressure monitoring  Location details: right internal jugular  Catheter size: 9 Fr  Patient position: Trendelenburg  Assessment: blood return through all ports and free fluid flow  Preparation: skin prepped with 2% chlorhexidine  Skin prep agent dried: skin prep agent completely dried prior to procedure  Sterile barriers: all five maximum sterile barriers used - cap, mask, sterile gown, sterile gloves, and large sterile sheet  Hand hygiene: hand hygiene performed prior to central venous catheter insertion  Ultrasound guidance: yes  ultrasound permanent image saved  Pre-procedure: landmarks identified  Number of attempts: 1  Successful placement: yes  Post-procedure: line sutured and dressing applied  Patient tolerance: Patient tolerated the procedure well with no immediate complications  Comments: Central line placed under sterile conditions with anatomic and ultrasound guidance  Venous access confirmed both with ultrasound and transduced column of blood  Column of blood was non-pulsatile and continuously drop to level of CVP  MAC line placed with SLIC through introducer

## 2022-06-14 NOTE — ANESTHESIA PROCEDURE NOTES
Arterial Line Insertion  Performed by: Domingo Bean CRNA  Authorized by: Mariana Herron MD   Consent: Verbal consent obtained  Written consent obtained  Risks and benefits: risks, benefits and alternatives were discussed  Consent given by: patient  Patient understanding: patient states understanding of the procedure being performed  Patient consent: the patient's understanding of the procedure matches consent given  Procedure consent: procedure consent matches procedure scheduled  Relevant documents: relevant documents present and verified  Test results: test results available and properly labeled  Site marked: the operative site was marked  Patient identity confirmed: verbally with patient and arm band  Time out: Immediately prior to procedure a "time out" was called to verify the correct patient, procedure, equipment, support staff and site/side marked as required  Preparation: Patient was prepped and draped in the usual sterile fashion    Indications: hemodynamic monitoring  Orientation:  Left  Location: radial artery  Sedation:  Patient sedated: no    Procedure Details:  Jose Alejandro's test normal: yes  Needle gauge: 20  Number of attempts: 1    Post-procedure:  Post-procedure: dressing applied  Waveform: good waveform  Post-procedure CNS: normal and unchanged  Patient tolerance: Patient tolerated the procedure well with no immediate complications and patient tolerated the procedure well with no immediate complications

## 2022-06-14 NOTE — RESPIRATORY THERAPY NOTE
RT Protocol Note  Nic Kingsley  76 y o  male MRN: [id number]  Unit/Bed#: [facility] 413-01 Encounter: [id number]    Assessment    Principal Problem: Aortic stenosis, severe  Active Problems:    PVD (peripheral vascular disease) ([facility])    Hypertension    Type 2 diabetes mellitus, without long-term current use of insulin ([facility])    GERD (gastroesophageal reflux disease)    Hyperlipidemia    Stage 4 chronic kidney disease ([facility])    S/P TAVR (transcatheter aortic valve replacement)      Home Pulmonary Medications:  na       Past Medical History:   Diagnosis Date    CAD (coronary artery disease)     Carotid stenosis, asymptomatic, bilateral     Chronic kidney disease     Diabetes ([facility])     type 2, non-insulin dependent    Diabetes mellitus ([facility])     GERD (gastroesophageal reflux disease)     History of nephrolithiasis     Hyperlipidemia     Hypertension     PAD (peripheral artery disease) ([facility])     Severe aortic stenosis      Social History     Socioeconomic History    Marital status:      Spouse name: None    Number of children: None    Years of education: None    Highest education level: None   Occupational History    None   Tobacco Use    Smoking status: Current Every Day Smoker     Packs/day: 0 25     Years: 50 00     Pack years: 12 50     Types: Cigarettes    Smokeless tobacco: Never Used    Tobacco comment: 3-4 cigarettes daily   Vaping Use    Vaping Use: Never used   Substance and Sexual Activity    Alcohol use: Yes     Comment: rare    Drug use: No    Sexual activity: None   Other Topics Concern    None   Social History Narrative    · Most recent tobacco use screenin2018      · Do you currently or have you served in [employer]:    Yes      · If Yes, What branch of service:   [employer]      · Occupation:   Dentists      · Exercise level:   Occasional        · Caffeine intake:   Heavy      · Marital status:         · Diet:   Regular  low fat     · Seat belts used routinely:   Yes · Smoke alarm in home: Yes      · Advance directive: Yes      · General stress level:   Low      Social Determinants of Health     Financial Resource Strain: Not on file   Food Insecurity: No Food Insecurity    Worried About Running Out of Food in the Last Year: Never true    Ran Out of Food in the Last Year: Never true   Transportation Needs: No Transportation Needs    Lack of Transportation (Medical): No    Lack of Transportation (Non-Medical): No   Physical Activity: Not on file   Stress: Not on file   Social Connections: Not on file   Intimate Partner Violence: Not on file   Housing Stability: Unknown    Unable to Pay for Housing in the Last Year: Not on file    Number of Places Lived in the Last Year: 1    Unstable Housing in the Last Year: No       Subjective         Objective    Physical Exam:   Assessment Type: (P) Assess only  General Appearance: (P) Drowsy  Respiratory Pattern: (P) Normal  Chest Assessment: (P) Chest expansion symmetrical  Bilateral Breath Sounds: (P) Diminished, Clear    Vitals:  Blood pressure 144/65, pulse 73, temperature (!) 96 °F (35 6 °C), temperature source Tympanic, resp  rate 18, height 5' 10" (1 778 m), weight 72 6 kg (160 lb), SpO2 100 %  Imaging and other studies: I have personally reviewed pertinent reports  Plan    Respiratory Plan: (P) Discontinue Protocol  Airway Clearance Plan: Incentive Spirometer     Resp Comments: (P) pt arrived post TAVR  will instruct on IS  no pulmonary hx  no home bronchodilator use  BS are clear  no distress noted  will dc respiratory protocol and continue to follow airway clearance

## 2022-06-14 NOTE — TELEPHONE ENCOUNTER
Called and l/m for Aminah Morin that he will need to see Dr Beverly Blanco for his follow up care  I told him appointment with Cristian Barron for Friday is cancelled  (spoke with Cristian Barron regarding this)

## 2022-06-14 NOTE — INTERVAL H&P NOTE
H&P reviewed  After examining the patient I find no changes in the patients condition since the H&P had been written  Vitals:    06/14/22 1159   BP: 144/65   Pulse: 73   Resp: 18   Temp: (!) 96 °F (35 6 °C)   SpO2: 99%     Anticipated Length of Stay:  Patient will be admitted on an Inpatient basis with an anticipated length of stay of  greater than 2 midnights  Justification for Hospital Stay:  Post surgical recovery following open heart surgery

## 2022-06-14 NOTE — ANESTHESIA PREPROCEDURE EVALUATION
Procedure:  REPLACEMENT AORTIC VALVE TRANSCATHETER (TAVR) TRANSAPICAL 29MM IRVIN KYLER S3 ULTRA VALVE(ACCESS ON LEFT) ELANA (N/A Chest)  CARDIAC TAVR (N/A Chest)    Relevant Problems   CARDIO   (+) Aortic stenosis, severe   (+) CAD (coronary artery disease)   (+) Hyperlipidemia   (+) Hypertension   (+) Progressive angina (HCC)   (+) S/P CABG (coronary artery bypass graft)   (+) S/P TAVR (transcatheter aortic valve replacement)   (+) Sinus bradycardia      ENDO   (+) Type 2 diabetes mellitus, without long-term current use of insulin (HCC)      GI/HEPATIC   (+) GERD (gastroesophageal reflux disease)      /RENAL   (+) Acute kidney injury superimposed on chronic kidney disease (HCC)   (+) Renal cyst, left   (+) Stage 4 chronic kidney disease (HCC)      HEMATOLOGY   (+) Iron deficiency anemia      NEURO/PSYCH   (+) Atherosclerosis of native arteries of extremities with intermittent claudication, bilateral legs (HCC)   (+) Progressive angina (HCC)   (+) Tension headache      PULMONARY   (+) Smoker        Physical Exam    Airway    Mallampati score: III  TM Distance: >3 FB  Neck ROM: full     Dental       Cardiovascular      Pulmonary      Other Findings        Anesthesia Plan  ASA Score- 4     Anesthesia Type- general with ASA Monitors  Additional Monitors: arterial line and central venous line  Airway Plan: ETT  Comment: ELANA  Transapical approach - plan for MAC line with SLIC - t/c floating [place] at end if needed  Post-op ICU  Nicole Paulino Plan Factors-Exercise tolerance (METS): >4 METS  Chart reviewed  EKG reviewed  Imaging results reviewed  Existing labs reviewed  Patient summary reviewed  Patient is not a current smoker  Patient did not smoke on day of surgery  Induction- intravenous  Postoperative Plan- Plan for postoperative opioid use  Planned trial extubation    Informed Consent- Anesthetic plan and risks discussed with patient  I personally reviewed this patient with the CRNA   Discussed and agreed on the Anesthesia Plan with the CRNA  Sukumar Chou Risks/benefits and alternatives discussed with Xiomara Godoy including possible PONV, sore throat, damage to teeth/lips/gums, and possibility of rare anesthetic and surgical emergencies including but not limited to heart attack, stroke, and/or death  Patient has no history of dysphagia, diverticula, esophageal dysmotility, strictures, stents, or varices  Additional risks associated with ELANA probe placement discussed including but not limited to soft tissue injury to oropharynx, dental injury, thermal injury to esophagus, and/or perforation of pharynx or esophagus  Additional risks of central line placement discussed including but not limited to infection, bleeding, inadvertent arterial cannulation, and/or pneumothorax  Preinduction ABP; post-induction TLC and PAC; ELANA and ICU post-operatively

## 2022-06-14 NOTE — CONSULTS
Consult - Malick 59  76 y o  male MRN: [id number]  Unit/Bed#: [facility] 413-01 Encounter: [id number]    Inpatient consult to Galilea Garcia 51 performed by: Golden Collier PA-C  Consult ordered by: Km Lockhart PA-C          Physician Requesting Consult: Lauren Georges DO    Reason for Consult / Principal Problem: Aortic stenosis, severe    HPI:Jay Gonzalez  is a 76 y o  male with PMH significant for CABG x 2 (2013), >70% bilateral carotid stenosis, severe aorto-iliac disease, s/p L: gem to anterior tib bypass (2015), s/p angioplasty bypass graft (2016), with recurrent bypass graft stenosis, CKD 4, DM  who initially presented to outpatient  CTS for evaluation of severe aortic stenosis and was recommended for transapical TAVR  Now presents to ICU s/p transapical TAVR  Post-procedure ELANA revealed LVEF 55%  Received no blood products intra-operatively  Arrives on Cardene, 5 mg/hour  PMH: CABG x 2 (2013), >70% bilateral carotid stenosis, severe aorto-iliac disease, s/p L femto anterior tib bypass (2015), s/p angioplasty bypass graft (2016), with recurrent bypass graft stenosis CKD 4, DM    History obtained from chart review due to patient being intubated and sedated  ---------------------------------------------------------------------------------------------------------------------------------------------------------------------  Impressions:  1  Severe AS s/p Transapical TAVR  2  CAD s/p CABG x 2 (2013)  3  Bilateral carotid stenosis  4  Severe aorto-iliac dx  5  S/p L fem anterior tub bypas (2016)  6  S/P angioplasty bypass graft (2016)  7  Recurrent bypass graft stenosis  8  CKD  9  CM II    Plan:    Neuro:   Tylenol ATC, prn Oxycodone for pain  Trend neuro exam   Delirium precautions  CV: MAP goal >65  SBP goal <130  CI>2 2  Post-op medications: Cardene, 5 mg/hour  Wean  as able  Volume resuscitation as needed  Monitor rhythm on telemetr   Intra-op ELANA LVEF 55%  Lung: Check STAT post-op ABG and CXR  Wean vent with spontaneous breathing trial with goal to extubate   GI: GI prophylaxis with PPI  Bowel regimen  Zofran PRN for nausea  FEN: NPO  Replenish K >4 0, mag >2 0 and calcium >7 0  : Check STAT post-op BMP  Archuleta in place  Monitor UOP with goal >0 5cc/kg/hour  Lasix versus volume resuscitate as needed depending on hemodynamics and volume status  ID: Prophylactic post-op abx  Maintain normothermia  Trend temps  Heme: Check STAT post-op H/H and platelets  Monitor incision site, invasive lines, and chest tube outputs for bleeding  Send coag panel if needed  Endo: Insulin gtt for blood sugar control  Results from last 6 Months   Lab Units 03/23/22  0510   HEMOGLOBIN A1C % 5 6     Disposition: ICU Care   ---------------------------------------------------------------------------------------------------------------------------------------------------------------------  Historical Information   Past Medical History:   Diagnosis Date    CAD (coronary artery disease)     Carotid stenosis, asymptomatic, bilateral     Chronic kidney disease     Diabetes ([facility] )     type 2, non-insulin dependent    Diabetes mellitus ([facility] )     GERD (gastroesophageal reflux disease)     History of nephrolithiasis     Hyperlipidemia     Hypertension     PAD (peripheral artery disease) (HCC)     Severe aortic stenosis      Past Surgical History:   Procedure Laterality Date    APPENDECTOMY      CARDIAC CATHETERIZATION N/A 5/5/2022    Procedure: CARDIAC RHC/LHC; Surgeon: Delisa Hewitt DO;  [facility]: [facility] CARD[facility]; Service: Cardiology    CARDIAC CATHETERIZATION N/A 5/5/2022    Procedure: Cardiac Coronary Angiogram;  Surgeon: Delisa Hewitt DO;  Location: [facility];   Service: Cardiology    CARDIAC CATHETERIZATION N/A 5/24/2022    Procedure: Cardiac pci;  Surgeon: Emmy Pathak MD;  Location: [facility] CARD[facility]; Service: Cardiology    COLECTOMY      COLONOSCOPY  2013    CORONARY ARTERY BYPASS GRAFT  2013    X 2    FEMORAL ARTERY - POPLITEAL ARTERY BYPASS GRAFT      HEMORRHOID SURGERY      IR AORTAGRAM WITH RUN-OFF  11/19/2018    DC SLCTV CATHJ 3RD+ ORD SLCTV ABDL PEL/LXTR [facility] Left 8/12/2016    Procedure: LEFT FEMORAL ARTERIOGRAM; BALLOON ANGIOPLASTY; SFA  AND FEMORAL AT VEIN GRAFT;  Surgeon: Juan Junior MD;  Location: [facility] MAIN OR;  Service: Vascular    TONSILLECTOMY AND ADENOIDECTOMY       Social History   Social History     Substance and Sexual Activity   Alcohol Use Yes    Comment: rare     Social History     Substance and Sexual Activity   Drug Use No     Social History     Tobacco Use   Smoking Status Current Every Day Smoker    Packs/day: 0 25    Years: 50 00    Pack years: 12 50    Types: Cigarettes   Smokeless Tobacco Never Used   Tobacco Comment    3-4 cigarettes daily     Family History   Problem Relation Age of Onset    Heart attack Father     Other Sister         bypass and vlave replacement    Stroke Paternal Uncle     Arrhythmia Neg Hx     Asthma Neg Hx     Clotting disorder Neg Hx     Fainting Neg Hx     Anuerysm Neg Hx     Hypertension Neg Hx         unsure     Hyperlipidemia Neg Hx     Heart failure Neg Hx      I have reviewed this patient's family history and commented on sigificant items within the HPI    ROS: ROS unable to be obtained due to patient being intubated and sedated  Allergies: No Known Allergies    Home Medications:   Prior to Admission medications    Medication Sig Start Date End Date Taking?  Authorizing Provider   allopurinol (ZYLOPRIM) 100 mg tablet Take 1 tablet (100 mg total) by mouth daily 5/27/22  Yes Alisia Favre, MD   amLODIPine (NORVASC) 10 mg tablet TAKE 1 TABLET DAILY 12/13/21  Yes Gordon Napier MD   AMYLASE-LIPASE-PROTEASE PO Take by mouth    Yes Historical Provider, MD   aspirin (ECOTRIN LOW STRENGTH) 81 mg EC tablet Take 81 mg by mouth daily Yes Historical Provider, MD   atorvastatin (LIPITOR) 80 mg tablet TAKE 1 TABLET DAILY AT     BEDTIME 2/23/22  Yes Oral Montaño MD   clopidogrel (PLAVIX) 75 mg tablet Take 1 tablet (75 mg total) by mouth in the morning  5/25/22  Yes ZHANNA Boyd   Cyanocobalamin (VITAMIN B12 PO) Take by mouth once a week     Yes Historical Provider, MD   Magnesium Oxide (MAG-200 PO) Take by mouth once a week    Yes Historical Provider, MD   Multiple Vitamin (MULTIVITAMIN) tablet Take 1 tablet by mouth daily 1-2 times a week    Yes Historical Provider, MD   mupirocin (BACTROBAN) 2 % nasal ointment into each nostril 2 (two) times a day 6/1/22  Yes Shanelle Zamora PA-C   pantoprazole (PROTONIX) 40 mg tablet TAKE 1 TABLET DAILY 6/11/22  Yes Elpidio Tamez PA-C   torsemide (DEMADEX) 20 mg tablet Take 1 tablet (20 mg total) by mouth 2 (two) times a day 3/31/22  Yes ZHANNA Kerr     Inpatient Medications:  Scheduled Meds:  Current Facility-Administered Medications   Medication Dose Route Frequency Provider Last Rate    acetaminophen  650 mg Rectal Q4H PRN Leo Mcfadden PA-C      acetaminophen  975 mg [facility]      allopurinol  100 mg Oral Daily Peter DelilahperLYN      aspirin  81 mg Oral Daily [place], [place]      atorvastatin  80 mg Oral HS [facility] LYN [place]      bisacodyl  10 mg Rectal Daily PRN Leo Mcfadden PA-C      calcium chloride  1 g Intravenous Once Leo Mcfadden PA-C      cefazolin  2,000 mg Intravenous [address] [place], [place]      chlorhexidine  15 mL Mouth/Throat BID Leo Mcfadden PA-C      clopidogrel  75 mg Oral Daily [place], [place]      fentanyl citrate (PF)  50 mcg Intravenous Once Leo Mcfadden PA-C      fentanyl citrate (PF)  50 mcg Intravenous Q1H PRN Leo Mcfadden PA-C      [START ON 6/15/2022] heparin (porcine)  5,000 Units Subcutaneous [facility] LYN [place]les      HYDROmorphone  0 5 mg Intravenous Q2H PRN [place], [place]      insulin lispro  1-5 Units Subcutaneous TID AC [facility], LYN      insulin lispro  1-5 Units Subcutaneous HS [facility] [place], LYN      metoprolol  2 5 mg Intravenous Q6H [address] [place], LYN      multi-electrolyte  50 mL/hr Intravenous Continuous [place], LYN      mupirocin  1 application Nasal P82X [address] [facility], LYN      niCARdipine  2 5-15 mg/hr Intravenous Titrated [place], LYN      ondansetron  4 mg Intravenous Q6H PRN [place], PA-LIN      oxyCODONE  2 5 mg Oral Q4H PRN [place], PA-LIN      oxyCODONE  5 mg Oral Q6H PRN [place], PA-LIN      pantoprazole  40 mg Oral Daily [place], PA-LIN      polyethylene glycol  17 g Oral Daily [place], LYN      potassium chloride  20 mEq Intravenous Once PRN [place], PA-LIN      potassium chloride  20 mEq Intravenous Q1H PRN [place], LYN      potassium chloride  20 mEq Intravenous Q30 Min PRN [place], LYN       Continuous Infusions:multi-electrolyte, 50 mL/hr  niCARdipine, 2 5-15 mg/hr      PRN Meds:  acetaminophen, 650 mg, Q4H PRN  bisacodyl, 10 mg, Daily PRN  fentanyl citrate (PF), 50 mcg, Q1H PRN  HYDROmorphone, 0 5 mg, Q2H PRN  ondansetron, 4 mg, Q6H PRN  oxyCODONE, 2 5 mg, Q4H PRN  oxyCODONE, 5 mg, Q6H PRN  potassium chloride, 20 mEq, Once PRN  potassium chloride, 20 mEq, Q1H PRN  potassium chloride, 20 mEq, Q30 Min PRN      ---------------------------------------------------------------------------------------------------------------------------------------------------------------------  Vitals:   Vitals:    06/10/22 1438 22 1159   BP:  144/65   Pulse:  73   Resp:  18   Temp:  (!) 96 °F (35 6 °C)   TempSrc:  Tympanic   SpO2:  99%   Weight: 73 9 kg (163 lb) 72 6 kg (160 lb)   Height: 5' 10" (1 778 m) 5' 10" (1 778 m)     Arterial Line:          Temperature: Temp (24hrs), Av °F (35 6 °C), Min:96 °F (35 6 °C), Max:96 °F (35 6 °C)  Current: Temperature: (!) 96 °F (35 6 °C)    Weights: IBW (Ideal Body Weight): 73 kg  Body mass index is 22 96 kg/m²  Hemodynamic Monitoring:  PAP:  , CVP:  , CO:  , CI:  , SVR:      Ventilator Settings:  Respiratory  Report   Lab Data (Last 4 hours)    None         O2/Vent Data (Last 4 hours)    None              Labs:   Results from last 7 days   Lab Units 06/14/22  1446 06/14/22  1412 06/14/22  1351   HEMOGLOBIN g/dL 10 6*  --   --    I STAT HEMOGLOBIN g/dl  --  9 5* 9 2*   HEMATOCRIT % 33 5*  --   --    HEMATOCRIT, ISTAT %  --  28* 27*   PLATELETS Thousands/uL 189  --   --      Results from last 7 days   Lab Units 06/14/22  1412 06/14/22  1351 06/14/22  1301 06/08/22  0805   SODIUM mmol/L  --   --   --  140   POTASSIUM mmol/L  --   --   --  4 1   CHLORIDE mmol/L  --   --   --  105   CO2 mmol/L  --   --   --  29   CO2, I-STAT mmol/L 30 31 31  --    BUN mg/dL  --   --   --  39*   CREATININE mg/dL  --   --   --  2 39*   CALCIUM mg/dL  --   --   --  9 0   ALK PHOS U/L  --   --   --  75   ALT U/L  --   --   --  18   AST U/L  --   --   --  13   GLUCOSE, ISTAT mg/dl 126 128 127  --        Post-op CXR: Tubes lines and drains in place no PTX I have personally reviewed pertinent films in PACS  Post-op EKG: Sinys rhythm with LBBB 1st degree AV block This was personally reviewed by myself  Physical Exam  Constitutional:       General: He is not in acute distress  Appearance: He is not ill-appearing  HENT:      Head: Normocephalic  Mouth/Throat:      Mouth: Mucous membranes are moist    Eyes:      Pupils: Pupils are equal, round, and reactive to light  Cardiovascular:      Rate and Rhythm: Regular rhythm  Tachycardia present  Pulses: Normal pulses  Heart sounds: Murmur heard  No friction rub  Pulmonary:      Effort: Pulmonary effort is normal  No respiratory distress  Breath sounds: No wheezing or rhonchi     Abdominal:      General: Bowel sounds are normal       Palpations: Abdomen is soft  Musculoskeletal:         General: No swelling  Normal range of motion  Skin:     General: Skin is warm  Neurological:      General: No focal deficit present  Mental Status: He is alert  Invasive lines and devices: Invasive Devices  Report    Central Venous Catheter Line  Duration           Introducer 06/14/22 <1 day          Peripheral Intravenous Line  Duration           Peripheral IV 06/14/22 Right;Dorsal (posterior) Hand <1 day    Peripheral IV 06/14/22 Right;Lateral Arm <1 day          Arterial Line  Duration           Arterial Line 06/14/22 Left Radial <1 day          Drain  Duration           Chest Tube 1 Left Posterior;Mediastinal 28 Fr  <1 day    Urethral Catheter Non-latex; Temperature probe 16 Fr  <1 day              ---------------------------------------------------------------------------------------------------------------------------------------------------------------------  Care Time Delivered:   No Critical Care time spent     SIGNATURE: Kandis Jamil PA-C  DATE: June 14, 2022  TIME: 2:57 PM

## 2022-06-15 ENCOUNTER — APPOINTMENT (INPATIENT)
Dept: NON INVASIVE DIAGNOSTICS | Facility: HOSPITAL | Age: 77
DRG: 266 | End: 2022-06-15
Payer: MEDICARE

## 2022-06-15 ENCOUNTER — APPOINTMENT (INPATIENT)
Dept: RADIOLOGY | Facility: HOSPITAL | Age: 77
DRG: 266 | End: 2022-06-15
Payer: MEDICARE

## 2022-06-15 LAB
ABO GROUP BLD BPU: NORMAL
ANION GAP SERPL CALCULATED.3IONS-SCNC: 5 MMOL/L (ref 4–13)
AORTIC ROOT: 2.7 CM
AORTIC VALVE MEAN VELOCITY: 13.2 M/S
APICAL FOUR CHAMBER EJECTION FRACTION: 60 %
ATRIAL RATE: 62 BPM
ATRIAL RATE: 79 BPM
ATRIAL RATE: 80 BPM
AV AREA BY CONTINUOUS VTI: 2 CM2
AV AREA PEAK VELOCITY: 2 CM2
AV LVOT MEAN GRADIENT: 2 MMHG
AV LVOT PEAK GRADIENT: 3 MMHG
AV MEAN GRADIENT: 8 MMHG
AV PEAK GRADIENT: 14 MMHG
AV VALVE AREA: 2.04 CM2
AV VELOCITY RATIO: 0.49
BPU ID: NORMAL
BUN SERPL-MCNC: 48 MG/DL (ref 5–25)
CALCIUM SERPL-MCNC: 8.6 MG/DL (ref 8.3–10.1)
CHLORIDE SERPL-SCNC: 106 MMOL/L (ref 100–108)
CO2 SERPL-SCNC: 29 MMOL/L (ref 21–32)
CREAT SERPL-MCNC: 2.11 MG/DL (ref 0.6–1.3)
CROSSMATCH: NORMAL
DOP CALC AO PEAK VEL: 1.84 M/S
DOP CALC AO VTI: 51 CM
DOP CALC LVOT AREA: 4.15 CM2
DOP CALC LVOT DIAMETER: 2.3 CM
DOP CALC LVOT PEAK VEL VTI: 25 CM
DOP CALC LVOT PEAK VEL: 0.9 M/S
DOP CALC LVOT STROKE INDEX: 53.6 ML/M2
DOP CALC LVOT STROKE VOLUME: 103.82 CM3
E WAVE DECELERATION TIME: 391 MS
ERYTHROCYTE [DISTWIDTH] IN BLOOD BY AUTOMATED COUNT: 15.3 % (ref 11.6–15.1)
FRACTIONAL SHORTENING: 26 (ref 28–44)
GFR SERPL CREATININE-BSD FRML MDRD: 28 ML/MIN/1.73SQ M
GLUCOSE SERPL-MCNC: 156 MG/DL (ref 65–140)
GLUCOSE SERPL-MCNC: 166 MG/DL (ref 65–140)
GLUCOSE SERPL-MCNC: 170 MG/DL (ref 65–140)
GLUCOSE SERPL-MCNC: 194 MG/DL (ref 65–140)
GLUCOSE SERPL-MCNC: 197 MG/DL (ref 65–140)
HCT VFR BLD AUTO: 28.8 % (ref 36.5–49.3)
HGB BLD-MCNC: 9.4 G/DL (ref 12–17)
INTERVENTRICULAR SEPTUM IN DIASTOLE (PARASTERNAL SHORT AXIS VIEW): 1.4 CM
INTERVENTRICULAR SEPTUM: 1.4 CM (ref 0.6–1.1)
LAAS-AP4: 18.8 CM2
LEFT ATRIUM SIZE: 4.8 CM
LEFT INTERNAL DIMENSION IN SYSTOLE: 4.2 CM (ref 2.1–4)
LEFT VENTRICULAR INTERNAL DIMENSION IN DIASTOLE: 5.7 CM (ref 3.5–6)
LEFT VENTRICULAR POSTERIOR WALL IN END DIASTOLE: 1 CM
LEFT VENTRICULAR STROKE VOLUME: 81 ML
LVSV (TEICH): 81 ML
MAGNESIUM SERPL-MCNC: 1.9 MG/DL (ref 1.6–2.6)
MCH RBC QN AUTO: 27.7 PG (ref 26.8–34.3)
MCHC RBC AUTO-ENTMCNC: 32.6 G/DL (ref 31.4–37.4)
MCV RBC AUTO: 85 FL (ref 82–98)
MV E'TISSUE VEL-SEP: 6 CM/S
MV PEAK A VEL: 1.07 M/S
MV PEAK E VEL: 101 CM/S
MV STENOSIS PRESSURE HALF TIME: 113 MS
MV VALVE AREA P 1/2 METHOD: 1.95
P AXIS: 68 DEGREES
P AXIS: 80 DEGREES
P AXIS: 80 DEGREES
PLATELET # BLD AUTO: 203 THOUSANDS/UL (ref 149–390)
PMV BLD AUTO: 10.5 FL (ref 8.9–12.7)
POTASSIUM SERPL-SCNC: 4.4 MMOL/L (ref 3.5–5.3)
PR INTERVAL: 224 MS
PR INTERVAL: 230 MS
PR INTERVAL: 236 MS
QRS AXIS: 66 DEGREES
QRS AXIS: 67 DEGREES
QRS AXIS: 73 DEGREES
QRSD INTERVAL: 112 MS
QRSD INTERVAL: 168 MS
QRSD INTERVAL: 170 MS
QT INTERVAL: 428 MS
QT INTERVAL: 482 MS
QT INTERVAL: 486 MS
QTC INTERVAL: 434 MS
QTC INTERVAL: 552 MS
QTC INTERVAL: 560 MS
RBC # BLD AUTO: 3.39 MILLION/UL (ref 3.88–5.62)
SL CV LV EF: 60
SL CV PED ECHO LEFT VENTRICLE DIASTOLIC VOLUME (MOD BIPLANE) 2D: 159 ML
SL CV PED ECHO LEFT VENTRICLE SYSTOLIC VOLUME (MOD BIPLANE) 2D: 79 ML
SODIUM SERPL-SCNC: 140 MMOL/L (ref 136–145)
T WAVE AXIS: 211 DEGREES
T WAVE AXIS: 222 DEGREES
T WAVE AXIS: 240 DEGREES
UNIT DISPENSE STATUS: NORMAL
UNIT PRODUCT CODE: NORMAL
UNIT PRODUCT VOLUME: 300 ML
UNIT PRODUCT VOLUME: 350 ML
UNIT RH: NORMAL
VENTRICULAR RATE: 62 BPM
VENTRICULAR RATE: 79 BPM
VENTRICULAR RATE: 80 BPM
WBC # BLD AUTO: 10.24 THOUSAND/UL (ref 4.31–10.16)

## 2022-06-15 PROCEDURE — 99222 1ST HOSP IP/OBS MODERATE 55: CPT | Performed by: INTERNAL MEDICINE

## 2022-06-15 PROCEDURE — 97167 OT EVAL HIGH COMPLEX 60 MIN: CPT

## 2022-06-15 PROCEDURE — 82948 REAGENT STRIP/BLOOD GLUCOSE: CPT

## 2022-06-15 PROCEDURE — 71045 X-RAY EXAM CHEST 1 VIEW: CPT

## 2022-06-15 PROCEDURE — 93010 ELECTROCARDIOGRAM REPORT: CPT | Performed by: INTERNAL MEDICINE

## 2022-06-15 PROCEDURE — 99222 1ST HOSP IP/OBS MODERATE 55: CPT | Performed by: NURSE PRACTITIONER

## 2022-06-15 PROCEDURE — 93306 TTE W/DOPPLER COMPLETE: CPT

## 2022-06-15 PROCEDURE — 93005 ELECTROCARDIOGRAM TRACING: CPT

## 2022-06-15 PROCEDURE — 99231 SBSQ HOSP IP/OBS SF/LOW 25: CPT | Performed by: THORACIC SURGERY (CARDIOTHORACIC VASCULAR SURGERY)

## 2022-06-15 PROCEDURE — 83735 ASSAY OF MAGNESIUM: CPT | Performed by: THORACIC SURGERY (CARDIOTHORACIC VASCULAR SURGERY)

## 2022-06-15 PROCEDURE — 80048 BASIC METABOLIC PNL TOTAL CA: CPT | Performed by: THORACIC SURGERY (CARDIOTHORACIC VASCULAR SURGERY)

## 2022-06-15 PROCEDURE — 85027 COMPLETE CBC AUTOMATED: CPT | Performed by: THORACIC SURGERY (CARDIOTHORACIC VASCULAR SURGERY)

## 2022-06-15 PROCEDURE — 99233 SBSQ HOSP IP/OBS HIGH 50: CPT | Performed by: INTERNAL MEDICINE

## 2022-06-15 PROCEDURE — 94762 N-INVAS EAR/PLS OXIMTRY CONT: CPT

## 2022-06-15 PROCEDURE — 97163 PT EVAL HIGH COMPLEX 45 MIN: CPT

## 2022-06-15 RX ORDER — DOCUSATE SODIUM 100 MG/1
100 CAPSULE, LIQUID FILLED ORAL 2 TIMES DAILY
Status: DISCONTINUED | OUTPATIENT
Start: 2022-06-15 | End: 2022-06-17 | Stop reason: HOSPADM

## 2022-06-15 RX ORDER — TORSEMIDE 20 MG/1
20 TABLET ORAL 2 TIMES DAILY
Status: DISCONTINUED | OUTPATIENT
Start: 2022-06-15 | End: 2022-06-17

## 2022-06-15 RX ADMIN — HEPARIN SODIUM 5000 UNITS: 5000 INJECTION INTRAVENOUS; SUBCUTANEOUS at 21:27

## 2022-06-15 RX ADMIN — METOPROLOL TARTRATE 25 MG: 25 TABLET, FILM COATED ORAL at 21:27

## 2022-06-15 RX ADMIN — NICARDIPINE HYDROCHLORIDE 8 MG/HR: 2.5 INJECTION, SOLUTION INTRAVENOUS at 07:33

## 2022-06-15 RX ADMIN — ATORVASTATIN CALCIUM 80 MG: 80 TABLET, FILM COATED ORAL at 21:27

## 2022-06-15 RX ADMIN — TORSEMIDE 20 MG: 20 TABLET ORAL at 08:47

## 2022-06-15 RX ADMIN — TORSEMIDE 20 MG: 20 TABLET ORAL at 17:31

## 2022-06-15 RX ADMIN — NICARDIPINE HYDROCHLORIDE 7 MG/HR: 2.5 INJECTION, SOLUTION INTRAVENOUS at 17:19

## 2022-06-15 RX ADMIN — PANTOPRAZOLE SODIUM 40 MG: 40 TABLET, DELAYED RELEASE ORAL at 08:47

## 2022-06-15 RX ADMIN — ALLOPURINOL 100 MG: 100 TABLET ORAL at 08:47

## 2022-06-15 RX ADMIN — ACETAMINOPHEN 975 MG: 325 TABLET, FILM COATED ORAL at 21:45

## 2022-06-15 RX ADMIN — MUPIROCIN 1 APPLICATION: 20 OINTMENT TOPICAL at 21:27

## 2022-06-15 RX ADMIN — INSULIN LISPRO 1 UNITS: 100 INJECTION, SOLUTION INTRAVENOUS; SUBCUTANEOUS at 16:29

## 2022-06-15 RX ADMIN — AMLODIPINE BESYLATE 10 MG: 10 TABLET ORAL at 08:47

## 2022-06-15 RX ADMIN — INSULIN LISPRO 1 UNITS: 100 INJECTION, SOLUTION INTRAVENOUS; SUBCUTANEOUS at 11:46

## 2022-06-15 RX ADMIN — NICARDIPINE HYDROCHLORIDE 9 MG/HR: 2.5 INJECTION, SOLUTION INTRAVENOUS at 04:47

## 2022-06-15 RX ADMIN — HEPARIN SODIUM 5000 UNITS: 5000 INJECTION INTRAVENOUS; SUBCUTANEOUS at 06:35

## 2022-06-15 RX ADMIN — CEFAZOLIN SODIUM 2000 MG: 2 SOLUTION INTRAVENOUS at 05:37

## 2022-06-15 RX ADMIN — POLYETHYLENE GLYCOL 3350 17 G: 17 POWDER, FOR SOLUTION ORAL at 08:47

## 2022-06-15 RX ADMIN — Medication 12.5 MG: at 08:47

## 2022-06-15 RX ADMIN — NICARDIPINE HYDROCHLORIDE 5 MG/HR: 2.5 INJECTION, SOLUTION INTRAVENOUS at 20:25

## 2022-06-15 RX ADMIN — CLOPIDOGREL BISULFATE 75 MG: 75 TABLET ORAL at 08:47

## 2022-06-15 RX ADMIN — NICARDIPINE HYDROCHLORIDE 5 MG/HR: 2.5 INJECTION, SOLUTION INTRAVENOUS at 12:15

## 2022-06-15 RX ADMIN — INSULIN LISPRO 1 UNITS: 100 INJECTION, SOLUTION INTRAVENOUS; SUBCUTANEOUS at 21:34

## 2022-06-15 RX ADMIN — DOCUSATE SODIUM 100 MG: 100 CAPSULE, LIQUID FILLED ORAL at 17:31

## 2022-06-15 RX ADMIN — DOCUSATE SODIUM 100 MG: 100 CAPSULE, LIQUID FILLED ORAL at 08:47

## 2022-06-15 RX ADMIN — INSULIN LISPRO 1 UNITS: 100 INJECTION, SOLUTION INTRAVENOUS; SUBCUTANEOUS at 06:35

## 2022-06-15 RX ADMIN — ACETAMINOPHEN 975 MG: 325 TABLET, FILM COATED ORAL at 14:24

## 2022-06-15 RX ADMIN — CEFAZOLIN SODIUM 2000 MG: 2 SOLUTION INTRAVENOUS at 13:12

## 2022-06-15 RX ADMIN — HEPARIN SODIUM 5000 UNITS: 5000 INJECTION INTRAVENOUS; SUBCUTANEOUS at 14:24

## 2022-06-15 RX ADMIN — ASPIRIN 81 MG CHEWABLE TABLET 81 MG: 81 TABLET CHEWABLE at 08:47

## 2022-06-15 RX ADMIN — MUPIROCIN 1 APPLICATION: 20 OINTMENT TOPICAL at 08:47

## 2022-06-15 NOTE — CASE MANAGEMENT
Case Management Assessment & Discharge Planning Note    Patient name Gopi Angela    Location [facility] 413/[facility] 413-01 MRN [id number]  : 1945 Date 6/15/2022       Current Admission Date: 2022  Current Admission Diagnosis:Aortic stenosis, severe   Patient Active Problem List    Diagnosis Date Noted    S/P TAVR (transcatheter aortic valve replacement) 2022    Elevated serum creatinine 2022    Chronic diastolic CHF (congestive heart failure) ([facility] ) 2022    Acute kidney injury superimposed on chronic kidney disease ([facility] ) 2022    Iron deficiency anemia 2022    Smoker 2022    Persistent proteinuria, unspecified 01/10/2022    Stage 4 chronic kidney disease ([place] [address] ) 01/10/2022    Leukopenia 01/10/2022    Hypomagnesemia 01/10/2022    Renal cyst, left 01/10/2022    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 ([facility] ) 09/10/2021    GERD (gastroesophageal reflux disease)     Hyperlipidemia     Type 2 diabetes mellitus, without long-term current use of insulin ([place] [address] ) 2021    Sinus bradycardia 10/08/2020    Aortic stenosis, severe 2019    Calcific tendinitis of right shoulder region 2019    Right shoulder pain 2019    Cigarette nicotine dependence with nicotine-induced disorder 10/22/2018    Sten[facility] of [facility]coronary [facility] graft [facility] 2016    Asymptomatic bilateral carotid artery stenosis 2016    S/P CABG (coronary artery bypass graft) 2016    Hypertension 2016    Aorto-iliac disease ([facility] ) 2016    Renal artery stenosis, native, bilateral ([place] [address] ) 2016    CAD (coronary artery disease) 2016    Progressive angina ([facility] ) 2016    Atherosclerosis of native arteries of extremities with intermittent claudication, bilateral legs (HCC)     PVD (peripheral vascular disease) (HCC)     Tension headache 10/24/2013      LOS (days): 1  Geometric Mean LOS (GMLOS) (days): 3 20  Days to GMLOS:2 1     OBJECTIVE:  PATIENT READMITTED TO HOSPITAL  Risk of Unplanned Readmission Score: 19 95         Current admission status: Inpatient       Preferred Pharmacy:   [address] [facility] [address]  [facility]   [address] [address]  Phone: [phone] Fax: [phone]     [address] [place], [address] [place] [address] [facility]  Phone: [phone] [address]ax: [address]  [place] [address] [place] [address]  Phone: [facility] [place] [address] [facility]  Phone: [phone] Fax: [phone]    Primary Care Provider: Jackie Villarreal MD    Primary Insurance: MEDICARE  Secondary Insurance: COMMERCIAL MISCELLANEOUS    ASSESSMENT:  Active Health Care Proxies    There are no active Health Care Proxies on file         Advance Directives  Does patient have a Health Care POA?: Yes  Does patient have Advance Directives?: Yes  Advance Directives: Living will, Power of  for health care  Primary Contact: catrachita Styles         Readmission Root Cause  30 Day Readmission: Yes  Who directed you to return to the hospital?: Specialist  Did you understand whom to contact if you had questions or problems?: Yes  Did you get your prescriptions before you left the hospital?: No  Reason[de-identified] Not preferred pharmacy  Were you able to get your prescriptions filled when you left the hospital?: Yes  Did you take your medications as prescribed?: Yes  Were you able to get to your follow-up appointments?: Yes  During previous admission, was a post-acute recommendation made?: No  Patient was readmitted due to: scheduled TAVR  Action Plan[facility]:  PT, RW    Patient Information  Admitted from[de-identified] Home  Mental Status: Alert  During Assessment patient was accompanied by: Not accompanied during assessment  Assessment information provided by[de-identified] Patient  Primary Caregiver: Self  Support Systems: Family members, [facility] of Residence: [address] [facility] do you live in?: [address] entry access options   Select all that apply : Stairs  Number of steps to enter home : 3  Do the steps have railings?: Yes  Type of Current Residence: [address] [place] [address], Apartment  Floor Level: 2  Upon entering residence, is there a bedroom on the main floor (no further steps)?: No  A bedroom is located on the following floor levels of residence (select all that apply):: 2nd Floor  Upon entering residence, is there a bathroom on the main floor (no further steps)?: No  Indicate which floors of current residence have a bathroom (select all the apply):: 2nd Floor  Number of steps to 2nd floor from main floor: One Flight  In the last 12 months, was there a time when you were not able to pay the mortgage or rent on time?: No  In the last 12 months, how many places have you lived?: 1  In the last 12 months, was there a time when you did not have a steady place to sleep or slept in a shelter (including now)?: No  Homeless/housing insecurity resource given?: N/A  Living Arrangements: Lives Alone, Other (Comment) (lives alone in second floor apartment, niece lives on first floor and pt's brother lives next door)  Is patient a ?: Yes  Is patient active with [address] [facility]: No    Activities of Daily Living Prior to Admission  Functional Status: Independent  Completes ADLs independently?: Yes  Ambulates independently?: Yes  Does patient use assisted devices?: No  Does patient currently own DME?: No  Does patient have a history of Outpatient Therapy (PT/OT)?: No  Does the patient have a history of Short-Term Rehab?: No  Does patient have a history of HHC?: Yes (JONATHAN many years ago)  Does patient currently have [facility]?: No         Patient Information Continued  Income Source: Pension/intermediate  Does patient have prescription coverage?: Yes  Within the past 12 months, you worried that your food would run out before you got the money to buy more : Never true  Within the past 12 months, the food you bought just didn't last and you didn't have money to get more : Never true  Food insecurity resource given?: N/A  Does patient receive dialysis treatments?: No  Does patient have a history of substance abuse?: No  Does patient have a history of Mental Health Diagnosis?: No         Means of Transportation  Means of Transport to Appts[de-identified] Drives Self        DISCHARGE DETAILS:    Discharge planning discussed with[de-identified] patient, remote hx of [facility], would use again, may need home PT or RW at discharge  Freedom of Choice: Yes     CM contacted family/caregiver?: No- see comments (pt is independent)  Were Treatment Team discharge recommendations reviewed with patient/caregiver?: Yes  Did patient/caregiver verbalize understanding of patient care needs?: Yes  Were patient/caregiver advised of the risks associated with not following Treatment Team discharge recommendations?: Yes    Contacts  Patient Contacts: son/POA Zhanna Ribera  Relationship to Patient[de-identified] Family  Contact Method: Phone  Phone Number: [phone]  Reason/Outcome: Continuity of Care, Emergency Contact, Discharge Planning              Other Referral/Resources/Interventions Provided:  Referral Comments: Pt does not want to be "homebound"    Would you like to participate in our [address] service program?  : No - Declined                                                 Additional Comments: 69yo male is POD#1 TA TAVR  On Cardene gtt  [email or web address], has one chest tube  PT recommending home PT and RW but pt does not want to be home bound  CM will follow pt's progress and readdress if needed  Lives alone in second floor apt but niece lives on first floor and his brother lives next door  Son to transport at discharge

## 2022-06-15 NOTE — PLAN OF CARE
Problem: MOBILITY - ADULT  Goal: Maintain or return to baseline ADL function  Description: INTERVENTIONS:  -  Assess patient's ability to carry out ADLs; assess patient's baseline for ADL function and identify physical deficits which impact ability to perform ADLs (bathing, care of mouth/teeth, toileting, grooming, dressing, etc )  - Assess/evaluate cause of self-care deficits   - Assess range of motion  - Assess patient's mobility; develop plan if impaired  - Assess patient's need for assistive devices and provide as appropriate  - Encourage maximum independence but intervene and supervise when necessary  - Involve family in performance of ADLs  - Assess for home care needs following discharge   - Consider OT consult to assist with ADL evaluation and planning for discharge  - Provide patient education as appropriate  Outcome: Progressing    Problem: Potential for Falls  Goal: Patient will remain free of falls  Description: INTERVENTIONS:  - Educate patient/family on patient safety including physical limitations  - Instruct patient to call for assistance with activity   - Consult OT/PT to assist with strengthening/mobility   - Keep Call bell within reach  - Keep bed low and locked with side rails adjusted as appropriate  - Keep care items and personal belongings within reach  - Initiate and maintain comfort rounds  - Make Fall Risk Sign visible to staff  - Apply yellow socks and bracelet for high fall risk patients  - Consider moving patient to room near nurses station  Outcome: Progressing     Problem: Prexisting or High Potential for Compromised Skin Integrity  Goal: Skin integrity is maintained or improved  Description: INTERVENTIONS:  - Identify patients at risk for skin breakdown  - Assess and monitor skin integrity  - Assess and monitor nutrition and hydration status  - Monitor labs   - Assess for incontinence   - Turn and reposition patient  - Assist with mobility/ambulation  - Relieve pressure over bony prominences  - Avoid friction and shearing  - Provide appropriate hygiene as needed including keeping skin clean and dry  - Evaluate need for skin moisturizer/barrier cream  - Collaborate with interdisciplinary team   - Patient/family teaching  - Consider wound care consult   Outcome: Progressing

## 2022-06-15 NOTE — CONSULTS
Consultation - Geriatrics   Liss Ryder  76 y o  male MRN: [id number]  Unit/Bed#: [facility] 413-01 Encounter: [id number]      Assessment/Plan  1  Ambulatory dysfunction   Baseline ambulation:  Uses cane   o Fall type none recently   PT OT following  Rec pending   Fall precautions  2  Deconditioning/frailty  · Baseline function independent with adl/iadls, lives alone, supportive family to assist  · Now increased risk for deconditioning/frailty d/t hospitalization, recent surgery  · Optimize diet, hydration, mobility for healing  · GFR 28 (chronic ckd)- monitor hydration, avoid nephrotoxins, renally dose medications  · Alb 3 4 - cont to monitor po intake, consider nutrition eval as needed  · Monitor ss infection, dehydration, dvt, skin breakdown  · Encourage cough and deep breathing exercises, IS   3   Forgetfulness  · Baseline mentation:  A&Ox4, no concerns for confusions or hallucinations  · MRI 2015:  Minimal chronic microangiopathic changes  · Gerilabs:  Consider b12/folate/tsh check  · Recommend OT check MOCa when medically clear and f/u with outpt geriatrics if concerns  · Engage in regular social, physical, cognitive activity  Optimize acute and chronic conditions  · Frequent reminders and close monitoring for safety  4  Delirium risk  · At risk for delirium d/t hospitalization, pain, sleep disturbance, recent surgery, ICCU stay  · [place] delirium precautions  First line treatment is reorient, redirect, reassure  Include family as able  Avoid restraints and sedating medications  · Identify and treat reversible causes of confusion including infection, dehydration, electrolyte imbalance, anemia, hypoxia, urinary retention, constipation, pain, sleep disturbance  · Avoid deliriogenic medications such as tramadol, high dose narcotics, benzodiazepines, anticholinergics  · Engage in regular activity  Monitor sleep wake cycle  Monitor depression  · Ensure adequate hydration and nutrition  Mobilize early and often according to poc  · Qtc 457ms  5  Pain  · Gout pain right great toe  Has acute left sided truncal pain (from chest tube)  · Cont allopurinol  · Cont scheduled tylenol, prn oxyIR - monitor for constipation  · Cont nonpharm methods of pain control  6   Anxiety/depression   Baseline mood:  Mild anxiety/depression    Encourage relaxation techniques, emotional support  o Pt finds comfort in his cat and garden  7  Vision/hearing impairment   Baseline vision/hearing:  Glasses, no hearing impairment   Cont supportive environment:  adequate lighting, quiet/calm space  Reminders for glasses  Speak clearly and toward patient when communicating  Avoid outside noise distractions  Assist as needed for adls  8   Elimination issues  · Bladder: none  Monitor for urinary retention  Avoid constipation  · Bowel:  None  Encourage dietary fiber, fluids, mobility as able  Cont colace/miralax  9   's safety  · Pt is current , Has no driving related concerns  · Made aware that should pt/family have concerns with his driving, he should f/u with pcp for OT 's safety eval   10   Severe AS  · Severe, aortic stenosis  Sx include leg swelling, fatigue, sob  · Now s/p TAVR 6/14  Critical care, cardiology, Cardiac surgery following  · Reviewed postop complications with patient to monitor for  11  Home medication review   PDMP review: error message from , unable to review  Home medications review:  See separate quick note by Dr Juany Raman       History of Present Illness   Physician Requesting Consult: Kenneth Velez DO  Reason for Consult / Principal Problem: s/p TAVR  Hx and PE limited by: n/a  HPI: Glenn Maloney  is a 76y o  year old male who presents with sob, increased fatigue, LE swelling d/t severe AS  He is s/p TAVR 6/14  Seen in ICU d/t still on cardene drip  Comorbidities include CAD, ckd, htn, dm2      Patient seen for geriatrics eval   He is resting in bed   Cold  He is A&Ox4  He is sore when coughing  He denies cp/sob/cough  Denies gi/gu distress  He is fatigued from poor sleep last night    Patient lives at home alone  He is independent with adl/iadls  He has supportive family, niece lives down the street and "cat shares" with him  He enjoys gardening  He is a current   Pt does use cane to ambulate, no recent falls  He has good appetite and no elimination issues  He denies memory issues, denies depression  He has mild anxiety, cats help  He does puzzles to help keep memory sharp - denies forgetfulness, confusions, hallucinations  He reports gout pain in toe  Inpatient consult to Gerontology  Consult performed by: HZANNA Castellanos  Consult ordered by: Sebastian Treviño PA-C          Review of Systems   Constitutional: Positive for fatigue  Negative for activity change, appetite change and chills  HENT: Negative for congestion, hearing loss and trouble swallowing  Eyes: Positive for visual disturbance (chronic, glasses, ok)  Respiratory: Negative for cough and shortness of breath  Cardiovascular: Negative for chest pain  Gastrointestinal: Negative for abdominal pain, constipation, diarrhea, nausea and vomiting  Genitourinary: Negative for difficulty urinating  Musculoskeletal: Positive for gait problem  Negative for arthralgias and back pain  Neurological: Negative for dizziness and light-headedness  Psychiatric/Behavioral: Positive for sleep disturbance (acute, environmental)  Negative for confusion, decreased concentration and hallucinations  The patient is nervous/anxious (mild)          Historical Information   Past Medical History:   Diagnosis Date    CAD (coronary artery disease)     Carotid stenosis, asymptomatic, bilateral     Chronic kidney disease     Diabetes ([facility] )     type 2, non-insulin dependent    [facility]     GERD (gastroesophageal reflux disease)     History of nephrolithiasis  Hyperlipidemia     Hypertension     [facility]ipheral artery [facility]) ([facility])     Severe aortic stenosis      Past Surgical History:   Procedure Laterality Date    APPENDECTOMY      CARDIAC CATHETERIZATION N/A 5/5/2022    Procedure: CARDIAC RHC/LHC; Surgeon: Hilda Barajas DO;  [facility]; Service: Cardiology    CARDIAC CATHETERIZATION N/A 5/5/2022    Procedure: Cardiac Coronary Angiogram;  Surgeon: Hilda Barajas DO;  [facility]; Service: Cardiology    CARDIAC CATHETERIZATION N/A 5/24/2022    Procedure: Cardiac pci;  Surgeon: Brendan Cruz MD;  [facility];   Service: Cardiology    CARDIAC CATHETERIZATION N/A 6/14/2022    Procedure: CARDIAC TAVR;  Surgeon: Sivakumar Wheeler MD;  Location: [facility];  Service: Cardiology    COLECTOMY      COLONOSCOPY  2013    CORONARY ARTERY BYPASS GRAFT  2013    X 2    FEMORAL ARTERY - POPLITEAL ARTERY BYPASS GRAFT      HEMORRHOID SURGERY      IR AORTAGRAM WITH RUN-OFF  11/19/2018    HI SLCTV CATHJ 3RD+ ORD SLCTV ABDL PEL/LXTR [facility] Left 8/12/2016    Procedure: LEFT FEMORAL ARTERIOGRAM; BALLOON ANGIOPLASTY; SFA  AND FEMORAL AT VEIN GRAFT;  Surgeon: Martin Lemus MD;  Location[facility];  Service: Vascular    HI TRANSCATHETER TRANSAPICAL REPLACEMT AORTIC VALVE N/A 6/14/2022    Procedure: REPLACEMENT AORTIC VALVE TRANSCATHETER (TAVR) TRANSAPICAL 29MM IRVIN KYLER S3 ULTRA VALVE(ACCESS ON LEFT) ELANA;  Surgeon: Kalina Ko DO;  [facility];  Service: Cardiac Surgery    TONSILLECTOMY AND ADENOIDECTOMY       Social History   Social History     Substance and Sexual Activity   Alcohol Use Yes    Comment: rare     Social History     Substance and Sexual Activity   Drug Use No     Social History     Tobacco Use   Smoking Status Current Every Day Smoker    Packs/day: 0 25    Years: 50 00    Pack years: 12 50    Types: Cigarettes   Smokeless Tobacco Never Used   Tobacco Comment 3-4 cigarettes daily         Family History:   Family History   Problem Relation Age of Onset    Heart attack Father     Other Sister         bypass and vlave replacement    Stroke Paternal Uncle     Arrhythmia Neg Hx     Asthma Neg Hx     Clotting disorder Neg Hx     Fainting Neg Hx     Anuerysm Neg Hx     Hypertension Neg Hx         unsure     Hyperlipidemia Neg Hx     Heart failure Neg Hx        Meds/Allergies   Current meds:   Current Facility-Administered Medications   Medication Dose Route Frequency    acetaminophen (TYLENOL) rectal suppository 650 mg  650 mg Rectal Q4H PRN    acetaminophen (TYLENOL) tablet 975 mg  975 mg Oral Q8H    allopurinol (ZYLOPRIM) tablet 100 mg  100 mg Oral Daily    amLODIPine (NORVASC) tablet 10 mg  10 mg Oral Daily    aspirin chewable tablet 81 mg  81 mg Oral Daily    atorvastatin (LIPITOR) tablet 80 mg  80 mg Oral HS    bisacodyl (DULCOLAX) rectal suppository 10 mg  10 mg Rectal Daily PRN    clopidogrel (PLAVIX) tablet 75 mg  75 mg Oral Daily    docusate sodium (COLACE) capsule 100 mg  100 mg Oral BID    heparin (porcine) subcutaneous injection 5,000 Units  5,000 Units Subcutaneous Q8H [address]    HYDROmorphone (DILAUDID) injection 0 5 mg  0 5 mg Intravenous Q2H PRN    insulin lispro (HumaLOG) 100 units/mL subcutaneous injection 1-5 Units  1-5 Units Subcutaneous TID AC    insulin lispro (HumaLOG) 100 units/mL subcutaneous injection 1-5 Units  1-5 Units Subcutaneous HS    metoprolol tartrate (LOPRESSOR) partial tablet 12 5 mg  12 5 mg Oral Q12H TRENT    mupirocin (BACTROBAN) 2 % nasal ointment 1 application  1 application Nasal J36F [address]    niCARdipine (CARDENE) 25 mg (STANDARD CONCENTRATION) in sodium chloride 0 9% 250 mL  2 5-15 mg/hr Intravenous Titrated    ondansetron (ZOFRAN) injection 4 mg  4 mg Intravenous Q6H PRN    oxyCODONE (ROXICODONE) IR tablet 2 5 mg  2 5 mg Oral Q4H PRN    oxyCODONE (ROXICODONE) IR tablet 5 mg  5 mg Oral Q6H PRN    pantoprazole (PROTONIX) EC tablet 40 mg  40 mg Oral Daily    polyethylene glycol (MIRALAX) packet 17 g  17 g Oral Daily    potassium chloride 20 mEq IVPB (premix)  20 mEq Intravenous Once PRN    potassium chloride 20 mEq IVPB (premix)  20 mEq Intravenous Q1H PRN    potassium chloride 20 mEq IVPB (premix)  20 mEq Intravenous Q30 Min PRN    torsemide (DEMADEX) tablet 20 mg  20 mg Oral BID      Current PTA meds:  Medications Prior to Admission   Medication    allopurinol (ZYLOPRIM) 100 mg tablet    amLODIPine (NORVASC) 10 mg tablet    AMYLASE-LIPASE-PROTEASE PO    aspirin (ECOTRIN LOW STRENGTH) 81 mg EC tablet    atorvastatin (LIPITOR) 80 mg tablet    clopidogrel (PLAVIX) 75 mg tablet    Cyanocobalamin (VITAMIN B12 PO)    Magnesium Oxide (MAG-200 PO)    Multiple Vitamin (MULTIVITAMIN) tablet    mupirocin (BACTROBAN) 2 % nasal ointment    pantoprazole (PROTONIX) 40 mg tablet    torsemide (DEMADEX) 20 mg tablet        No Known Allergies    Objective   Vitals: Blood pressure 150/60, pulse 61, temperature 97 5 °F (36 4 °C), temperature source Oral, resp  rate 21, height 5' 10" (1 778 m), weight 74 4 kg (164 lb), SpO2 96 %  ,Body mass index is 23 53 kg/m²  Physical Exam  Vitals and nursing note reviewed  Constitutional:       General: He is not in acute distress  Appearance: Normal appearance  He is well-developed  He is not diaphoretic  HENT:      Head: Normocephalic  Cardiovascular:      Rate and Rhythm: Normal rate  Heart sounds: No friction rub  No gallop  Pulmonary:      Effort: Pulmonary effort is normal  No respiratory distress  Breath sounds: Normal breath sounds  No wheezing or rales  Abdominal:      General: Bowel sounds are normal  There is no distension  Palpations: Abdomen is soft  Tenderness: There is no abdominal tenderness  Musculoskeletal:         General: Normal range of motion  Cervical back: Normal range of motion     Skin:     General: Skin is warm and dry    Neurological:      General: No focal deficit present  Mental Status: He is alert and oriented to person, place, and time  Mental status is at baseline  Psychiatric:         Mood and Affect: Mood normal          Behavior: Behavior normal          Lab Results:   Results from last 7 days   Lab Units 06/15/22  0352   WBC Thousand/uL 10 24*   HEMOGLOBIN g/dL 9 4*   HEMATOCRIT % 28 8*   PLATELETS Thousands/uL 203        Results from last 7 days   Lab Units 06/15/22  0352 06/14/22  1840 06/14/22  1454   POTASSIUM mmol/L 4 4   < >  --    CHLORIDE mmol/L 106  --   --    CO2 mmol/L 29  --   --    CO2, I-STAT mmol/L  --   --  32   BUN mg/dL 48*  --   --    CREATININE mg/dL 2 11*  --   --    CALCIUM mg/dL 8 6  --   --    GLUCOSE, ISTAT mg/dl  --   --  143*    < > = values in this interval not displayed  Imaging Studies: I have personally reviewed pertinent reports  EKG, Pathology, and Other Studies: I have personally reviewed pertinent reports      VTE Prophylaxis: Heparin    Code Status: Level 1 - Full Code

## 2022-06-15 NOTE — CONSULTS
Consultation - Cardiology Team One  Abiel Jean  76 y o  male MRN: [id number]  Unit/Bed#: [facility] 413-01 Encounter: [id number]    Inpatient consult to Cardiology  Consult performed by: ZHANNA Jordan  Consult ordered by: Willian Hager PA-C      Physician Requesting Consult: Annette Graff DO  Reason for Consult / Principal Problem: s/p trans-apical TAVR    Assessment/ Plan    1  Severe AS s/p trans-apical TAVR 6/14/22  POD #1  Being weaned off cardene gtt, on 3LNC  CT remains in place  Intra op ELANA showed normally functioning bio AVR without PVL, TTE pending  On ASA, Plavix, and statin  Started on beta blocker this morning, tele shows sinus bradycardia with rates around 50 bpm  Was previously taken off of beta blocker due to bradycardia  Need to monitor closely on telemetry  2  CAD s/p CABG x 2 2013, PCI of pRCA 5/24/22  No anginal symptoms  DAPT in setting of recent PCI- ASA and Plavix  Continue statin    3  Chronic diastolic CHF  Continued on home diuretic regimen: torsemide 20 mg BID (decreased at hospital follow up appt 3/31)  Wean O2 as able  Strict I/Os, daily weights, am BMP    4  HTN- weaning off cardene gtt  Continued on home amlodipine 10 mg daily and torsemide 20 mg BID    5  HLD- on statin therapy    6  Sinus bradycardia-previously taken off beta blockers with improvement in heart rates  Restarted on low dose metoprolol here and is bradycardic  Monitor closely on telemetry    7  CKD- Cr 2 11 which is improved from recent labs  Follows with nephrology as outpatient    8  PAD s/p LLE revascularization, severe bilateral carotid artery disease    9   Chronic anemia- hgb stable, 9 4    History of Present Illness   HPI: Abiel Jean  is a 76y o  year old male with CAD s/p CABG x2 September 2013, hypertension, severe aortic stenosis, dyslipidemia, PAD s/p left lower extremity revascularization, severe bilateral carotid disease followed by vascular surgery, sinus bradycardia improved off beta blockers, tobacco abuse, chronic anemia, and CKD  He follows with cardiologist Dr Maria C Perez  He was previously evaluated for TAVR back in September 2020 but declined  [facility] with volume overload in March 2022  He was diuresed and agreeable to see cardiothoracic surgery so an appointment was arranged for 4/1/22  Echocardiogram completed during his hospitalization in March showed LVEF 50% with grade 2 diastolic dysfunction, moderate hypokinesis of the basal to mid inferior wall, severe AS with mild AI, and moderate MR  Preoperative cardiac catheterization demonstrated significant proximal RCA stenosis and he underwent PCI of this lesion on 05/24/2022 with 10% residual stenosis after intervention  He presented to [facility] for elective trans-apical TAVR with a 29 mm Smith Howie bioprosthetic valve  Final intraoperative ELANA demonstrated normally functioning prosthetic valve without perivalvular leak  Postop TTE being completed at time of exam today  Hemodynamically stable and being weaned off cardene gtt  Currently requiring 3 L nasal cannula maintain adequate O2 saturations  Creatinine is stable post operatively  CT and rubi catheter remain in place  Cardiology consulted as part of routine post operative management  EKG reviewed personally: 6/15 NSR with 1st degree AV block    Telemetry reviewed personally: sinus bradycardia with 1st degree AV block     Review of Systems   Constitutional: Positive for malaise/fatigue  Negative for chills and weight gain  Cardiovascular: Positive for chest pain (incision/chest tube)  Negative for dyspnea on exertion, leg swelling, orthopnea, palpitations and syncope  Respiratory: Negative for cough, shortness of breath, sleep disturbances due to breathing, sputum production and wheezing  Gastrointestinal: Negative for bloating, nausea and vomiting  Neurological: Negative for dizziness, light-headedness and weakness     All other systems reviewed and are negative  Historical Information   Past Medical History:   Diagnosis Date    CAD (coronary artery disease)     Carotid stenosis, asymptomatic, bilateral     Chronic kidney disease     Diabetes ([facility] [address] )     type 2, non-insulin dependent    Diabetes mellitus ([facility] [address] )     GERD (gastroesophageal reflux disease)     History of nephrolithiasis     Hyperlipidemia     Hypertension     [facility]peripheral [facility] disease) ([facility])     Severe aortic stenosis      Past Surgical History:   Procedure Laterality Date    APPENDECTOMY      CARDIAC CATHETERIZATION N/A 5/5/2022    Procedure: CARDIAC RHC/LHC; Surgeon: Humza Arauz DO;  [facility]: [facility]; Service: Cardiology    CARDIAC CATHETERIZATION N/A 5/5/2022    Procedure: Cardiac Coronary Angiogram;  Surgeon: Humza Arauz DO;  [facility]; Service: Cardiology    CARDIAC CATHETERIZATION N/A 5/24/2022    Procedure: Cardiac pci;  Surgeon: Stephen Pearson MD;  [facility];   Service: Cardiology    CARDIAC CATHETERIZATION N/A 6/14/2022    Procedure: CARDIAC TAVR;  Surgeon: Fabienne Chandler MD;  Location: [facility] MA[facility];  Service: Cardiology    COLECTOMY      COLONOSCOPY  2013    CORONARY ARTERY BYPASS GRAFT  2013    X 2    FEMORAL ARTERY - POPLITEAL ARTERY BYPASS GRAFT      HEMORRHOID SURGERY      IR AORTAGRAM WITH RUN-OFF  11/19/2018    ME SLCTV CATHJ 3RD+ ORD SLCTV ABDL PEL/LXTR [facility] Left 8/12/2016    Procedure: LEFT FEMORAL ARTERIOGRAM; BALLOON ANGIOPLASTY; SFA  AND FEMORAL AT VEIN GRAFT;  Surgeon: Bonny Dance, MD;  Location: [facility] MAIN [facility];  Service: Vascular    ME TRANSCATHETER TRANSAPICAL REPLACEMT AORTIC VALVE N/A 6/14/2022    Procedure: REPLACEMENT AORTIC VALVE TRANSCATHETER (TAVR) TRANSAPICAL 29MM IRVIN KYLER S3 ULTRA VALVE(ACCESS ON LEFT) ELANA;  Surgeon: Sho Castaneda DO;  Location: [facility];  Service: Cardiac Surgery    TONSILLECTOMY AND ADENOIDECTOMY       Social History Substance and Sexual Activity   Alcohol Use Yes    Comment: rare     Social History     Substance and Sexual Activity   Drug Use No     Social History     Tobacco Use   Smoking Status Current Every Day Smoker    Packs/day: 0 25    Years: 50 00    Pack years: 12 50    Types: Cigarettes   Smokeless Tobacco Never Used   Tobacco Comment    3-4 cigarettes daily     Family History:   Family History   Problem Relation Age of Onset    Heart attack Father     Other Sister         bypass and vlave replacement    Stroke Paternal Uncle     Arrhythmia Neg Hx     Asthma Neg Hx     Clotting disorder Neg Hx     Fainting Neg Hx     Anuerysm Neg Hx     Hypertension Neg Hx         unsure     Hyperlipidemia Neg Hx     Heart failure Neg Hx      Meds/Allergies   all current active meds have been reviewed and current meds:   Current Facility-Administered Medications   Medication Dose Route Frequency    acetaminophen (TYLENOL) rectal suppository 650 mg  650 mg Rectal Q4H PRN    acetaminophen (TYLENOL) tablet 975 mg  975 mg Oral Q8H    allopurinol (ZYLOPRIM) tablet 100 mg  100 mg Oral Daily    amLODIPine (NORVASC) tablet 10 mg  10 mg Oral Daily    aspirin chewable tablet 81 mg  81 mg Oral Daily    atorvastatin (LIPITOR) tablet 80 mg  80 mg Oral HS    bisacodyl (DULCOLAX) rectal suppository 10 mg  10 mg Rectal Daily PRN    ceFAZolin (ANCEF) IVPB (premix in dextrose) 2,000 mg 50 mL  2,000 mg Intravenous Q8H    clopidogrel (PLAVIX) tablet 75 mg  75 mg Oral Daily    docusate sodium (COLACE) capsule 100 mg  100 mg Oral BID    heparin (porcine) subcutaneous injection 5,000 Units  5,000 Units Subcutaneous Q8H [facility]    HYDROmorphone (DILAUDID) injection 0 5 mg  0 5 mg Intravenous Q2H PRN    insulin lispro (HumaLOG) 100 units/mL subcutaneous injection 1-5 Units  1-5 Units Subcutaneous TID AC    insulin lispro (HumaLOG) 100 units/mL subcutaneous injection 1-5 Units  1-5 Units Subcutaneous HS    metoprolol tartrate (LOPRESSOR) partial tablet 12 5 mg  12 5 mg Oral Q12H [address]    mupirocin (BACTROBAN) 2 % nasal ointment 1 application  1 application Nasal A15Q [address]    niCARdipine (CARDENE) 25 mg (STANDARD CONCENTRATION) in sodium chloride 0 9% 250 mL  2 5-15 mg/hr Intravenous Titrated    ondansetron (ZOFRAN) injection 4 mg  4 mg Intravenous Q6H PRN    oxyCODONE (ROXICODONE) IR tablet 2 5 mg  2 5 mg Oral Q4H PRN    oxyCODONE (ROXICODONE) IR tablet 5 mg  5 mg Oral Q6H PRN    pantoprazole (PROTONIX) EC tablet 40 mg  40 mg Oral Daily    polyethylene glycol (MIRALAX) packet 17 g  17 g Oral Daily    potassium chloride 20 mEq IVPB (premix)  20 mEq Intravenous Once PRN    potassium chloride 20 mEq IVPB (premix)  20 mEq Intravenous Q1H PRN    potassium chloride 20 mEq IVPB (premix)  20 mEq Intravenous Q30 Min PRN    torsemide (DEMADEX) tablet 20 mg  20 mg Oral BID     niCARdipine, 2 5-15 mg/hr, Last Rate: 7 mg/hr (06/15/22 0938)        No Known Allergies    Objective   Vitals: Blood pressure 116/56, pulse 57, temperature 98 24 °F (36 8 °C), resp  rate (!) 26, height 5' 10" (1 778 m), weight 74 7 kg (164 lb 10 9 oz), SpO2 95 %  , Body mass index is 23 63 kg/m²  ,     Systolic (35TPT), IOH:770 , Min:93 , AOF:605     Diastolic (62JDN), JDP:46, Min:46, Max:65        Intake/Output Summary (Last 24 hours) at 6/15/2022 0954  Last data filed at 6/15/2022 0801  Gross per 24 hour   Intake 3045 09 ml   Output 1560 ml   Net 1485 09 ml     Wt Readings from Last 3 Encounters:   06/15/22 74 7 kg (164 lb 10 9 oz)   06/01/22 73 9 kg (163 lb)   05/24/22 72 6 kg (160 lb)     Invasive Devices  Report    Central Venous Catheter Line  Duration           Introducer 06/14/22 <1 day          Peripheral Intravenous Line  Duration           Peripheral IV 06/14/22 Right;Dorsal (posterior) Hand <1 day    Peripheral IV 06/14/22 Right;Lateral Arm <1 day          Arterial Line  Duration           Arterial Line 06/14/22 Left Radial <1 day          Drain  Duration Chest Tube 1 Left Posterior;Mediastinal 28 Fr  <1 day    Urethral Catheter Non-latex; Temperature probe 16 Fr  <1 day                Physical Exam  Vitals reviewed  Constitutional:       General: He is not in acute distress  Neck:      Vascular: No hepatojugular reflux or JVD  Comments: R CVC  Cardiovascular:      Rate and Rhythm: Normal rate and regular rhythm  Heart sounds: Normal heart sounds  No murmur heard  No friction rub  No gallop  Pulmonary:      Effort: Pulmonary effort is normal  No respiratory distress  Breath sounds: No rales  Comments: Diminished throughout, poor inspiratory effort 2/2 pain  96% on 3LNC  Left CT in place  Abdominal:      General: Bowel sounds are normal  There is no distension  Palpations: Abdomen is soft  Tenderness: There is no abdominal tenderness  Genitourinary:     Comments: Archuleta catheter draining clear, light yellow urine  Musculoskeletal:         General: No tenderness  Normal range of motion  Cervical back: Neck supple  Right lower leg: No edema  Left lower leg: No edema  Skin:     General: Skin is warm and dry  Capillary Refill: Capillary refill takes 2 to 3 seconds  Findings: No erythema  Comments: Left chest incision dressing C/D/I   Neurological:      Mental Status: He is alert and oriented to person, place, and time     Psychiatric:         Mood and Affect: Mood normal      LABORATORY RESULTS:      CBC with diff:   Results from last 7 days   Lab Units 06/15/22  0352 06/14/22  1454 06/14/22  1446 06/14/22  1412 06/14/22  1351 06/14/22  1301   WBC Thousand/uL 10 24*  --   --   --   --   --    HEMOGLOBIN g/dL 9 4*  --  10 6*  --   --   --    I STAT HEMOGLOBIN g/dl  --  10 9*  --  9 5* 9 2* 10 2*   HEMATOCRIT % 28 8*  --  33 5*  --   --   --    HEMATOCRIT, ISTAT %  --  32*  --  28* 27* 30*   MCV fL 85  --   --   --   --   --    PLATELETS Thousands/uL 203  --  189  --   --   --    MCH pg 27 7  -- --   --   --   --    MCHC g/dL 32 6  --   --   --   --   --    RDW % 15 3*  --   --   --   --   --    MPV fL 10 5  --  9 9  --   --   --        CMP:  Results from last 7 days   Lab Units 06/15/22  0352 06/14/22  2205 06/14/22  1840 06/14/22  1454 06/14/22  1446 06/14/22  1412 06/14/22  1351 06/14/22  1301   POTASSIUM mmol/L 4 4 4 7 3 8  --  3 9  --   --   --    CHLORIDE mmol/L 106  --   --   --  108  --   --   --    CO2 mmol/L 29  --   --   --  30  --   --   --    CO2, I-STAT mmol/L  --   --   --  32  --  30 31 31   BUN mg/dL 48*  --   --   --  42*  --   --   --    CREATININE mg/dL 2 11*  --   --   --  2 49*  --   --   --    GLUCOSE, ISTAT mg/dl  --   --   --  143*  --  126 128 127   CALCIUM mg/dL 8 6  --   --   --  8 6  --   --   --    EGFR ml/min/1 73sq m 28  --   --   --  23  --   --   --        BMP:  Results from last 7 days   Lab Units 06/15/22  0352 06/14/22  2205 06/14/22  1840 06/14/22  1454 06/14/22  1446 06/14/22  1412 06/14/22  1351 06/14/22  1301   POTASSIUM mmol/L 4 4 4 7 3 8  --  3 9  --   --   --    CHLORIDE mmol/L 106  --   --   --  108  --   --   --    CO2 mmol/L 29  --   --   --  30  --   --   --    CO2, I-STAT mmol/L  --   --   --  32  --  30 31 31   BUN mg/dL 48*  --   --   --  42*  --   --   --    CREATININE mg/dL 2 11*  --   --   --  2 49*  --   --   --    GLUCOSE, ISTAT mg/dl  --   --   --  143*  --  126 128 127   CALCIUM mg/dL 8 6  --   --   --  8 6  --   --   --        Lab Results   Component Value Date    CREATININE 2 11 (H) 06/15/2022    CREATININE 2 49 (H) 06/14/2022    CREATININE 2 39 (H) 06/08/2022       Lab Results   Component Value Date    NTBNP 34,910 (H) 03/22/2022          Results from last 7 days   Lab Units 06/15/22  0352   MAGNESIUM mg/dL 1 9                     Results from last 7 days   Lab Units 06/14/22  1446   INR  1 10     Lipid Profile:   No results found for: CHOL  Lab Results   Component Value Date    HDL 52 05/05/2022    HDL 31 (L) 06/30/2021    HDL 39 (L) 09/15/2020 Lab Results   Component Value Date    LDLCALC 33 2022    LDLCALC 36 2021    LDLCALC 36 09/15/2020     Lab Results   Component Value Date    TRIG 50 2022    TRIG 90 2021    TRIG 85 09/15/2020       Cardiac testing:   Results for orders placed during the hospital encounter of 21    Echo complete with contrast if indicated    Narrative  [address] [facility]  [phone]    Transthoracic Echocardiogram  2D, M-mode, Doppler, and Color Doppler    Study date:  06-May-2021    Patient: Ivone Yeung  MR number: [de-identified]  Account number: [de-identified]  : 1945  Age: 64 years  Gender: Male  Status: Outpatient  Location: [place] [address]   Height: 70 in  Weight: 181 lb  BP: 150/ 58 mmHg    Indications: Assess the aortic valve  Diagnoses: I35 0 - Nonrheumatic aortic (valve) stenosis    Sonographer:  Chris Cheng RDCS  Primary Physician:  Sofia Thurston MD  Referring Physician:  Juan Carlson MD  Group:  [facility]  Interpreting Physician:  Catherine Cardozo MD    SUMMARY    LEFT VENTRICLE:  Systolic function was normal  Ejection fraction was estimated to be 60 %  There were no regional wall motion abnormalities  Wall thickness was mildly increased  Doppler parameters were consistent with abnormal left ventricular relaxation (grade 1 diastolic dysfunction)  LEFT ATRIUM:  The atrium was mildly dilated  MITRAL VALVE:  There was mild regurgitation  AORTIC VALVE:  The valve was probably trileaflet  Leaflets exhibited moderately increased thickness and moderate calcification  There was severe stenosis  There was trace regurgitation  Valve peak gradient was 63 mmHg  Valve mean gradient was 32 mmHg  Aortic valve area was 1 cmï¾² by the continuity equation  Estimated aortic valve area (by VTI) was 0 91 cmï¾²  TRICUSPID VALVE:  There was trace regurgitation      HISTORY: PRIOR HISTORY: CAD, bradycardia, DM, dyslipidemia, PAD, CABG,    PROCEDURE: The study was performed in the [facility]  This was a routine study  The transthoracic approach was used  The study included complete 2D imaging, M-mode, complete spectral Doppler, and color Doppler  The heart rate was  66 bpm, at the start of the study  Images were obtained from the parasternal, apical, subcostal, and suprasternal notch acoustic windows  Image quality was adequate  LEFT VENTRICLE: Size was normal  Systolic function was normal  Ejection fraction was estimated to be 60 %  There were no regional wall motion abnormalities  Wall thickness was mildly increased  DOPPLER: Doppler parameters were consistent  with abnormal left ventricular relaxation (grade 1 diastolic dysfunction)  RIGHT VENTRICLE: The size was normal  Systolic function was normal  Wall thickness was normal     LEFT ATRIUM: The atrium was mildly dilated  RIGHT ATRIUM: Size was normal     MITRAL VALVE: Valve structure was normal  There was normal leaflet separation  DOPPLER: The transmitral velocity was within the normal range  There was no evidence for stenosis  There was mild regurgitation  AORTIC VALVE: The valve was probably trileaflet  Leaflets exhibited moderately increased thickness and moderate calcification  DOPPLER: There was severe stenosis  There was trace regurgitation  TRICUSPID VALVE: The valve structure was normal  There was normal leaflet separation  DOPPLER: The transtricuspid velocity was within the normal range  There was no evidence for stenosis  There was trace regurgitation  PULMONIC VALVE: Leaflets exhibited normal thickness, no calcification, and normal cuspal separation  DOPPLER: The transpulmonic velocity was within the normal range  There was no significant regurgitation  PERICARDIUM: There was no pericardial effusion  The pericardium was normal in appearance  AORTA: The root exhibited normal size      SYSTEMIC VEINS: IVC: The inferior vena cava was normal in size  MEASUREMENT TABLES    2D MEASUREMENTS  LVOT   (Reference normals)  Diam   23 mm   (--)    DOPPLER MEASUREMENTS  LVOT   (Reference normals)  VTI   24 cm   (--)  R-R interval   1017 ms   (--)  HR   59 bpm   (--)  Stroke vol   99 71 ml   (--)  Cardiac output   5 88 L/min   (--)  Cardiac index   2 94 L/min/mï¾²   (--)  Aortic valve   (Reference normals)  VTI   108 cm   (--)  Peak gradient   63 mmHg   (--)  Mean gradient   32 mmHg   (--)  Valve area, cont   1 cmï¾²   (--)  Obstr index, VTI   0 22    (--)  Valve area, VTI   0 91 cmï¾²   (--)  Area index, VTI   0 46 cmï¾²/mï¾²   (--)    SYSTEM MEASUREMENT TABLES    2D  %FS: 23 52 %  Ao Diam: 3 06 cm  EDV(Teich): 137 67 ml  EF(Teich): 46 6 %  ESV(Teich): 73 51 ml  IVSd: 1 26 cm  LA Area: 24 18 cm2  LA Diam: 4 19 cm  LVEDV MOD A4C: 198 32 ml  LVEF MOD A4C: 54 08 %  LVESV MOD A4C: 91 07 ml  LVIDd: 5 34 cm  LVIDs: 4 08 cm  LVLd A4C: 10 15 cm  LVLs A4C: 8 98 cm  LVOT Diam: 2 43 cm  LVPWd: 1 18 cm  RA Area: 14 06 cm2  RVIDd: 3 75 cm  SV MOD A4C: 107 24 ml  SV(Teich): 64 16 ml    CW  AV Env  Ti: 414 84 ms  AV MaxP 54 mmHg  AV VTI: 103 91 cm  AV Vmax: 3 82 m/s  AV Vmean: 2 5 m/s  AV meanP 08 mmHg    MM  TAPSE: 2 13 cm    PW  LUIS (VTI): 1 06 cm2  LUIS Vmax: 1 04 cm2  E' Sept: 0 04 m/s  E/E' Sept: 22 83  LVOT Env  Ti: 482 08 ms  LVOT VTI: 23 7 cm  LVOT Vmax: 0 86 m/s  LVOT Vmean: 0 5 m/s  LVOT maxP 94 mmHg  LVOT meanP 25 mmHg  LVSV Dopp: 110 25 ml  MV A Pop: 1 23 m/s  MV Dec [place]: 5 05 m/s2  MV DecT: 180 67 ms  MV E Pop: 0 91 m/s  MV E/A Ratio: 0 74  MV PHT: 52 39 ms  MVA By PHT: 4 2 cm2    [employer] Accredited Echocardiography Laboratory    Prepared and electronically signed by    Melodie Donaldson MD  Signed 06-May-2021 16:40:31    No results found for this or any previous visit  No valid procedures specified  No results found for this or any previous visit        Imaging: I have personally reviewed pertinent reports  and I have personally reviewed pertinent films in PACS  Cardiac catheterization    Addendum Date: 5/24/2022 Addendum:   · Prox RCA lesion is 95% stenosed  · Mid RCA lesion is 50% stenosed  Result Date: 5/24/2022  Narrative: · Prox RCA lesion is 95% stenosed  · Mid RCA lesion is 50% stenosed  ELANA Anesthesia    Result Date: 6/14/2022  Narrative: Fawn Humphrey MD     6/14/2022  2:11 PM Procedure Performed: ELANA Anesthesia Start Time: Preanesthesia Checklist Patient identified, IV assessed, risks and benefits discussed, monitors and equipment assessed, procedure being performed at surgeon's request and anesthesia consent obtained  Procedure  Type of ELANA: complete ELANA with interpretation  Images Saved: ultrasound permanent image saved  Physician Requesting Echo: Sho Castaneda DO  Location performed: OR  Intubated  Bite block not placed  Heart visualized  Insertion of ELANA Probe:  Atraumatic  Probe Type:  Multiplane  Modalities:  2D only, 3D, color flow mapping, continuous wave Doppler and pulse wave Doppler  Echocardiographic and Doppler Measurements PREPROCEDURE LEFT VENTRICLE: Systolic Function: normal  Ejection Fraction: 55%  RIGHT VENTRICLE: Systolic Function: normal   Cavity size normal  Hypertrophy present  AORTIC VALVE: Leaflets: trileaflet  Leaflet motions restricted  Stenosis: severe  Peak Gradient: 30 mmHg  Regurgitation: mild  MITRAL VALVE: Leaflets: normal  Leaflet Motions: normal  Regurgitation: moderate  Stenosis: none  TRICUSPID VALVE: Leaflets: normal  Leaflet Motions: normal   Regurgitation: mild  PULMONIC VALVE: Leaflets: normal   ASCENDING AORTA: Size:  normal   Dissection not present  AORTIC ARCH: Size:  normal   dissection not present  Grade 3: atheroma protruding < 0 5 cm into lumen  DESCENDING AORTA: Size: normal   Dissection not present  Grade 3: atheroma protruding < 0 5 cm into lumen   RIGHT ATRIUM: Size:  normal  No spontaneous echo contrast  LEFT ATRIUM: Size: normal  No spontaneous echo contrast  LEFT ATRIAL APPENDAGE: Size: normal  No spontaneous echo contrast ATRIAL SEPTUM: Intra-atrial septal morphology: normal   VENTRICULAR SEPTUM: Intra-ventricular septum morphology: normal  OTHER FINDINGS: Pericardium:  normal  Pleural Effusion:  none  POSTPROCEDURE LEFT VENTRICLE: Unchanged   Ejection Fraction: 55 %  RIGHT VENTRICLE: Unchanged   AORTIC VALVE: Leaflets: bioprosthetic  Mean Gradient: 3 mmHg  Regurgitation: none  Valve Size: 29 mm  MITRAL VALVE:   Stenosis: mild  TRICUSPID VALVE: Unchanged   PULMONIC VALVE: Unchanged   ATRIA: Unchanged   AORTA: Unchanged   Dissection: Dissection not present  REMOVAL: Probe Removal: atraumatic  ECHOCARDIOGRAM COMMENTS: Post-TAVR: LUIS 2 71 cm^2 (VTI), mean gradient 3 mmHg, no PVL  Nikole Sequin XR chest portable ICU    Result Date: 6/14/2022  Narrative: CHEST INDICATION:   s/p tavr  COMPARISON:  3/22/2022 EXAM PERFORMED/VIEWS:  XR CHEST PORTABLE ICU FINDINGS: Sternotomy wires are visualized associated with new aortic valvular prosthesis with new right-sided internal jugular double-lumen central line extending into the superior vena cava  New left-sided chest tube is visualized  Otherwise there is no acute infiltrate with no pneumothorax and no pleural effusion  Underlying emphysematous lung disease is visualized  Peribronchial cuffing is seen bilaterally  Atherosclerotic aorta with unchanged cardiac silhouette is visualized  Degenerative changes is seen within the spine  Impression: Status post aortic valvular prosthesis  Central line in correct location  No pneumothorax in a patient with left-sided chest tube  No acute infiltrate  Workstation performed: [id number]       Assessment  Principal Problem:     Aortic stenosis, severe  Active Problems:    PVD (peripheral vascular disease) ([facility])    Hypertension    Type 2 diabetes mellitus, without long-term current use of insulin ([facility])    GERD (gastroesophageal reflux disease)    Hyperlipidemia    Stage 4 chronic kidney disease (HCC)    S/P TAVR (transcatheter aortic valve replacement)      Thank you for allowing us to participate in this patient's care  This pt will follow up with          once discharged  Counseling / Coordination of Care  Total floor / unit time spent today 45 minutes  Greater than 50% of total time was spent with the patient and / or family counseling and / or coordination of care  A description of the counseling / coordination of care: Review of history, current assessment, development of a plan  Code Status: Level 1 - Full Code    ** Please Note: Dragon 360 Dictation voice to text software may have been used in the creation of this document   **

## 2022-06-15 NOTE — PLAN OF CARE
Problem: PHYSICAL THERAPY ADULT  Goal: Performs mobility at highest level of function for planned discharge setting  See evaluation for individualized goals  Description: Treatment/Interventions: LE strengthening/ROM, Functional transfer training, Elevations, Therapeutic exercise, Endurance training, Equipment eval/education, Bed mobility, Gait training, Spoke to nursing, Spoke to case management, OT  Equipment Recommended: Jack Tucker       See flowsheet documentation for full assessment, interventions and recommendations  Note: Prognosis: Good  Problem List: Decreased endurance, Decreased strength, Decreased mobility, Impaired balance, Decreased safety awareness, Pain  Assessment: Pt is a 76 y o  male seen for PT evaluation s/p admit to [facility] on 6/14/2022  Pt was admitted with a primary dx of: severe aortic stenosis  Pt is POD#1 s/p transapical TAVR  PT now consulted for assessment of mobility and d/c needs  Pt with Up with assistance, PT evaluation orders  Pts current comorbidities effecting treatment include: CAD, CKD, DM, HTN, PAD, CABG (2013), bilateral carotid stenosis  Pts current clinical presentation is Unstable/ Unpredictable (high complexity) due to Ongoing medical management for primary dx, Increased reliance on more restrictive AD compared to baseline, Decreased activity tolerance compared to baseline, Fall risk, Increased assistance needed from caregiver at current time, Ongoing telemetry monitoring, Increased O2 via NC from pts baseline, Trending lab values, s/p surgical intervention  Prior to admission, pt was independent with ADLs and ambulating without any AD  Pt lives alone in a 2nd floor apt with full flight to enter; niece lives below him  Upon evaluation, pt currently is requiring Edwardo for bed mobility; Edwardo for transfers; and Edwardo for ambulation 180 ft w/ RW   Pt presents at PT eval functioning below baseline and currently w/ overall mobility deficits 2* to: BLE weakness, impaired balance, decreased endurance, gait deviations, pain, decreased activity tolerance compared to baseline, decreased functional mobility tolerance compared to baseline, fall risk, SOB upon exertion  Pt currently at a fall risk 2* to impairments listed above  Pt will continue to benefit from skilled acute PT interventions to address stated impairments; to maximize functional mobility; for ongoing pt/ family training; and DME needs  At conclusion of PT session pt returned BTB with phone and call bell within reach  Pt denies any further questions at this time  The patient's AM-PAC Basic Mobility Inpatient Short Form Raw Score is 18  A Raw score of greater than 16 suggests the patient may benefit from discharge to home  Please also refer to the recommendation of the Physical Therapist for safe discharge planning  Recommend home with HHPT upon hospital D/C  Barriers to Discharge: Inaccessible home environment, Decreased caregiver support        PT Discharge Recommendation: Home with home health rehabilitation          See flowsheet documentation for full assessment

## 2022-06-15 NOTE — PROGRESS NOTES
Progress Note - Cardiothoracic Surgery   Jignesh Kayser  76 y o  male MRN: [id number]  Unit/Bed#: [facility] 413-01 Encounter: [id number]      POD # 1 s/p TA-TAVR    Pt seen/examined  Interval history and data reviewed with critical care team   Pt doing well  No specific complaints          Medications:   Scheduled Meds:  Current Facility-Administered Medications   Medication Dose Route Frequency Provider Last Rate    acetaminophen  650 mg Rectal Q4H PRN Darrick Harry PA-C      acetaminophen  975 mg Oral [address] [place], [place] [address]LYN      allopurinol  100 mg Oral Daily [place], [place]      amLODIPine  10 mg Oral Daily ZHANNA Ferrell      aspirin  81 mg Oral Daily [place], [place]      ator[facility]  80 [facility] HS [facility]      bisacodyl  10 mg Rectal Daily PRN Darrick Harry PA-C      cefazolin  2,000 mg Intravenous [address] [place], [place] [address]LYN 2,000 mg (06/15/22 0537)    clopidogrel  75 mg Oral Daily Darrickumm Harry PA-C      docusate sodium  100 mg Oral BID Bella Peng PA-C      heparin (porcine)  5,000 Units Subcutaneous [place], [place]      HYDROmorphone  0 5 mg Intravenous Q2H PRN Darrick Harry PA-C      insulin lispro  1-5 Units Subcutaneous TID  Martha Robles PA-C      insulin lispro  1-5 Units Subcutaneous HS Darrick Harry PA-C      metoprolol tartrate  12 5 mg Oral Q12H [facility] Bella Peng PA-C      mupirocin  1 application Nasal L86R [facility]      niCARdipine  2 5-15 mg/hr Intravenous Titrated CHRIS HuntC 8 mg/hr (06/15/22 0733)    ondansetron  4 mg Intravenous Q6H PRN Darrick Harry PA-C      oxyCODONE  2 5 mg Oral Q4H PRN Darrickumm Harry, PA-LIN      oxyCODONE  5 mg Oral Q6H PRN Darrickumm Harry, PA-LIN      pantoprazole  40 mg Oral Daily Darrick InnJEAN-PIERRE turner-C      polyethylene glycol  17 g Oral Daily Darrick Harry PA-C      potassium chloride  20 mEq Intravenous Once PRN [facility] Rafy Solitario PA-C      potassium chloride  20 mEq Intravenous Q1H PRN Darrick Harry PA-C 20 mEq (06/14/22 2036)    potassium chloride  20 mEq Intravenous Q30 Min PRN Darrick Harry PA-C      torsemide  20 mg Oral BID Yaneth Wilson PA-C       Continuous Infusions:niCARdipine, 2 5-15 mg/hr, Last Rate: 8 mg/hr (06/15/22 0733)      PRN Meds:   acetaminophen    bisacodyl    HYDROmorphone    ondansetron    oxyCODONE    oxyCODONE    potassium chloride    potassium chloride    potassium chloride    Vitals: Blood pressure 119/55, pulse 55, temperature 98 06 °F (36 7 °C), resp  rate 18, height 5' 10" (1 778 m), weight 74 7 kg (164 lb 10 9 oz), SpO2 93 %  ,Body mass index is 23 63 kg/m²  I/O last 24 hours: In: 3045 1 [P O :120; I V :2725 1; IV Piggyback:200]  Out: 1560 [Urine:1260; Chest Tube:300]  Invasive Devices  Report    Central Venous Catheter Line  Duration           Introducer 06/14/22 <1 day          Peripheral Intravenous Line  Duration           Peripheral IV 06/14/22 Right;Dorsal (posterior) Hand <1 day    Peripheral IV 06/14/22 Right;Lateral Arm <1 day          Arterial Line  Duration           Arterial Line 06/14/22 Left Radial <1 day          Drain  Duration           Chest Tube 1 Left Posterior;Mediastinal 28 Fr  <1 day    Urethral Catheter Non-latex; Temperature probe 16 Fr  <1 day                  Lab, Imaging and other studies:   Results from last 7 days   Lab Units 06/15/22  0352 06/14/22  1454 06/14/22  1446   WBC Thousand/uL 10 24*  --   --    HEMOGLOBIN g/dL 9 4*  --  10 6*   I STAT HEMOGLOBIN g/dl  --  10 9*  --    HEMATOCRIT % 28 8*  --  33 5*   HEMATOCRIT, ISTAT %  --  32*  --    PLATELETS Thousands/uL 203  --  189     Results from last 7 days   Lab Units 06/15/22  0352 06/14/22  2205 06/14/22  1840 06/14/22  1454 06/14/22  1446   POTASSIUM mmol/L 4 4 4 7 3 8  --  3 9   CHLORIDE mmol/L 106  --   --   --  108   CO2 mmol/L 29  --   --   --  30   CO2, I-STAT mmol/L  --   --   --  32  -- BUN mg/dL 48*  --   --   --  42*   CREATININE mg/dL 2 11*  --   --   --  2 49*   GLUCOSE, ISTAT mg/dl  --   --   --  143*  --    CALCIUM mg/dL 8 6  --   --   --  8 6     Results from last 7 days   Lab Units 06/14/22  1446   INR  1 10   PTT seconds 33     No results for input(s): PHART, RCA9OBO, PO2ART, SOJ8LNJ, K3SXERYF, BEART in the last 72 hours  Plan:    LUCINDA Mcmillan/Geremias  Continue chest tubes  Ambulate  Incentive spirometry  Diuresis    Restart home antihypertensives  Wean cardene as able        SIGNATURE: Dimple Joseph,   DATE: Yolanda 15, 2022  TIME: 8:39 AM

## 2022-06-15 NOTE — PHYSICAL THERAPY NOTE
Physical Therapy Evaluation    Patient's Name: Jesika Mota      Admitting Diagnosis  Aortic stenosis, severe [I35 0]  Aorto-iliac disease (HCC) [I77 9]    Problem List  Patient Active Problem List   Diagnosis    Atherosclerosis of native arteries of extremities with intermittent claudication, bilateral legs ([facility])    PVD (peripheral vascular disease) ([facility] )    Stenosis of noncoronary bypass graft ([facility])    Asymptomatic bilateral carotid artery stenosis    S/P CABG (coronary artery bypass graft)    Hypertension    Aorto-iliac disease ([facility])    Renal artery stenosis, native, bilateral ([facility])    CAD (coronary artery disease)    Progressive angina ([facility])    Tension headache    Cigarette nicotine dependence with nicotine-induced disorder    Calcific tendinitis of right shoulder region    Right shoulder pain    Aortic stenosis, severe    Sinus bradycardia    Type 2 diabetes mellitus, without long-term current use of insulin ([facility])    GERD (gastroesophageal reflux disease)    Hyperlipidemia    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 ([facility])    Persistent proteinuria, unspecified    Stage 4 chronic kidney disease ([facility])    Leukopenia    Hypomagnesemia    Renal cyst, left    Acute kidney injury superimposed on chronic kidney disease ([facility])    Iron deficiency anemia    Smoker    Chronic diastolic CHF (congestive heart failure) ([facility])    Elevated serum creatinine    S/P TAVR (transcatheter aortic valve replacement)       Past Medical History  Past Medical History:   Diagnosis Date    CAD (coronary artery disease)     Carotid stenosis, asymptomatic, bilateral     Chronic kidney disease     Diabetes ([place] [address] )     type 2, non-insulin dependent    Diabetes mellitus ([facility])     GERD (gastroesophageal reflux disease)     History of nephrolithiasis     Hyperlipidemia     Hypertension     PAD (peripheral artery disease) ([facility])     Severe aortic stenosis        Past Surgical History  Past Surgical History:   Procedure Laterality Date    APPENDECTOMY      CARDIAC CATHETERIZATION N/A 5/5/2022    Procedure: CARDIAC RHC/LHC; Surgeon: Humza Arauz DO;  [facility]; Service: Cardiology    CARDIAC CATHETERIZATION N/A 5/5/2022    Procedure: Cardiac Coronary Angiogram;  Surgeon: Humza Arauz DO;  [facility]; Service: Cardiology    CARDIAC CATHETERIZATION N/A 5/24/2022    Procedure: Cardiac pci;  Surgeon: Stephen Pearson MD;  [facility]; Service: Cardiology    CARDIAC CATHETERIZATION N/A 6/14/2022    Procedure: CARDIAC TAVR;  Surgeon: Fabienne Chandler MD;  Location: [facility];  Service: Cardiology    COLECTOMY      COLONOSCOPY  2013    CORONARY ARTERY BYPASS GRAFT  2013    X 2    FEMORAL ARTERY - POPLITEAL ARTERY BYPASS GRAFT      HEMORRHOID SURGERY      IR AORTAGRAM WITH RUN-OFF  11/19/2018    MI SLCTV CATHJ 3RD+ ORD SLCTV ABDL PEL/LXTR [facility] Left 8/12/2016    Procedure: LEFT FEMORAL ARTERIOGRAM; BALLOON ANGIOPLASTY; SFA  AND FEMORAL AT VEIN GRAFT;  Surgeon: Bonny Dance, MD;  Location: [facility];  Service: Vascular    MI TRANSCATHETER TRANSAPICAL REPLACEMT AORTIC VALVE N/A 6/14/2022    Procedure: REPLACEMENT AORTIC VALVE TRANSCATHETER (TAVR) TRANSAPICAL 29MM IRVIN KYLER S3 ULTRA VALVE(ACCESS ON LEFT) ELANA;  Surgeon: Sho Castaneda DO;  Location[facility];  Service: Cardiac Surgery    TONSILLECTOMY AND ADENOIDECTOMY          06/15/22 1019   PT Last Visit   PT Visit Date 06/15/22   Note Type   Note type Evaluation   Pain Assessment   Pain Assessment Tool 0-10   Pain Score No Pain   Restrictions/Precautions   Weight Bearing Precautions Per Order No   Other Precautions Multiple lines;Telemetry;O2;Fall Risk  (CT, a-line, 3L O2)   Home Living   Type of [address] level;Stairs to enter with rails   Home Equipment   (denies)   Additional Comments Pt lives in a 2nd floor apartment with full flight to enter   Normally ambulates without any AD   Prior Function   Level of [place] Independent with ADLs and functional mobility   Lives With Alone   Receives Help From Family   ADL Assistance Independent   IADLs Independent   Falls in the last 6 months 0   Comments Pt lives alone, but his niece lives in the apartment below him   Cognition   Arousal/Participation Alert   Orientation Level Oriented X4   Memory Decreased recall of precautions   Following Commands Follows one step commands without difficulty   RLE Assessment   RLE Assessment WFL   LLE Assessment   LLE Assessment WFL   Bed Mobility   Sit to Supine 4  Minimal assistance   Additional items Assist x 1; Increased time required;Verbal cues;LE management   Transfers   Sit to Stand 4  Minimal assistance   Additional items Assist x 1; Increased time required;Verbal cues   Stand to Sit 4  Minimal assistance   Additional items Assist x 1; Increased time required;Verbal cues   Additional Comments Transfers with RW  VCs for proper hand placement and safety   Ambulation/Elevation   Gait pattern Excessively slow; Short stride   Gait Assistance 4  Minimal assist   Additional items Assist x 1  (SBA of 2 more for lines/chair follow)   Assistive Device Rolling walker   Distance 180ft   Balance   Static Sitting Fair +   Dynamic Sitting Fair   Static Standing Fair -   Dynamic Standing Fair -   Ambulatory Poor +   Activity Tolerance   Activity Tolerance Patient limited by fatigue   Medical Staff Made Aware Seen with OT 2* pt's medical complexity and multiple comorbidities  PT focus on functional transfers, gait, and endurance   Nurse Made Aware ok to see per RN   Assessment   Prognosis Good   Problem List Decreased endurance;Decreased strength;Decreased mobility; Impaired balance;Decreased safety awareness;Pain   Assessment Pt is a 76 y o  male seen for PT evaluation s/p admit to [facility] on 6/14/2022  Pt was admitted with a primary dx of: severe aortic stenosis   Pt is POD#1 s/p transapical TAVR  PT now consulted for assessment of mobility and d/c needs  Pt with Up with assistance, PT evaluation orders  Pts current comorbidities effecting treatment include: CAD, CKD, DM, HTN, PAD, CABG (2013), bilateral carotid stenosis  Pts current clinical presentation is Unstable/ Unpredictable (high complexity) due to Ongoing medical management for primary dx, Increased reliance on more restrictive AD compared to baseline, Decreased activity tolerance compared to baseline, Fall risk, Increased assistance needed from caregiver at current time, Ongoing telemetry monitoring, Increased O2 via NC from pts baseline, Trending lab values, s/p surgical intervention  Prior to admission, pt was independent with ADLs and ambulating without any AD  Pt lives alone in a 2nd floor apt with full flight to enter; niece lives below him  Upon evaluation, pt currently is requiring Edwardo for bed mobility; Edwardo for transfers; and Edwardo for ambulation 180 ft w/ RW  Pt presents at PT eval functioning below baseline and currently w/ overall mobility deficits 2* to: BLE weakness, impaired balance, decreased endurance, gait deviations, pain, decreased activity tolerance compared to baseline, decreased functional mobility tolerance compared to baseline, fall risk, SOB upon exertion  Pt currently at a fall risk 2* to impairments listed above  Pt will continue to benefit from skilled acute PT interventions to address stated impairments; to maximize functional mobility; for ongoing pt/ family training; and DME needs  At conclusion of PT session pt returned BTB with phone and call bell within reach  Pt denies any further questions at this time  The patient's AM-PAC Basic Mobility Inpatient Short Form Raw Score is 18  A Raw score of greater than 16 suggests the patient may benefit from discharge to home  Please also refer to the recommendation of the Physical Therapist for safe discharge planning   Recommend home with HHPT upon hospital D/C    Barriers to Discharge Inaccessible home environment;Decreased caregiver support   Goals   Patient Goals to feel better   STG Expiration Date 06/29/22   Short Term Goal #1 In 10-14 days pt will be able to: 1  Demonstrate ability to perform all aspects of bed mobility with independently to increase functional independence  2  Perform functional transfers at mod (I) level to facilitate safe return to previous living environment  3   Ambulate 300 ft with LRAD at mod (I) level with stable vitals to improve safety with household distances and reduce fall risk  4  Climb 12 steps with HR at mod (I) level to simulate entrance to home  5  Improve LE strength grades by 1 to increase ease of functional mobility with transfers and gait  6  Pt will demonstrate improved balance by one grade order to decrease risk of falls  PT Treatment Day 0   Plan   Treatment/Interventions LE strengthening/ROM; Functional transfer training;Elevations; Therapeutic exercise; Endurance training;Equipment eval/education; Bed mobility;Gait training;Spoke to nursing;Spoke to case management;OT   PT Frequency 4-6x/wk   Recommendation   PT Discharge Recommendation Home with home health rehabilitation   Equipment Recommended [facility] Recommended Wheeled walker   Change/add to [employer]? No   AM-PAC Basic Mobility Inpatient   Turning in Bed Without Bedrails 3   Lying on Back to Sitting on Edge of Flat Bed 3   Moving Bed to Chair 3   Standing Up From Chair 3   Walk in Room 3   Climb 3-5 Stairs 3   Basic Mobility Inpatient Raw Score 18   Basic Mobility Standardized Score 41 05   Highest Level Of Mobility   JH-HLM Goal 6: Walk 10 steps or more   JH-HLM Achieved 7: Walk 25 feet or more   Modified Madrid Scale   Modified Madrid Scale 4   End of Consult   Patient Position at End of Consult Supine; All needs within reach  (back to bed for echo)       Ludy Mendes, PT, DPT

## 2022-06-15 NOTE — PLAN OF CARE
Problem: OCCUPATIONAL THERAPY ADULT  Goal: Performs self-care activities at highest level of function for planned discharge setting  See evaluation for individualized goals  Description: Treatment Interventions: ADL retraining, Functional transfer training, Endurance training, Patient/family training, Equipment evaluation/education, Compensatory technique education, Continued evaluation, Energy conservation, Activityengagement          See flowsheet documentation for full assessment, interventions and recommendations  Note: Limitation: Decreased ADL status, Decreased endurance, Decreased self-care trans, Decreased high-level ADLs  Prognosis: Good  Assessment: Pt is a 76 y o  male admitted to [facility] on 6/14/2022 w/ severe aortic stenosis s/p TAVR "bioprosthetic valve via transapical approach" on 6/14/2022  Pt  has a past medical history of CAD (coronary artery disease), Carotid stenosis, asymptomatic, bilateral, Chronic kidney disease, Diabetes ([facility] ), [facility] GERD (gastroesophageal reflux disease), History of nephrolithiasis, Hyperlipidemia, Hypertension, PAD (peripheral artery disease) ([facility] ), and Severe aortic stenosis  Pt with active OT orders and up with assistance  orders  Pt resides in a 2nd floor apartment with no elevator access  Pt lives alone, but reports that his niece lives in the same building and is able to assist upon d/c  Pt was I w/  ADLS and IADLS, (+) drove, & required no use of DME PTA  Currently pt is min A for functional transfers, functional mobility, and ADLS overall  Pt is limited at this time 2*: endurance, activity tolerance, functional mobility, forward functional reach, functional standing tolerance and decreased I w/ ADLS/IADLS  The following Occupational Performance Areas to address include: grooming, bathing/shower, toilet hygiene, functional mobility and clothing management   Based on the aforementioned OT evaluation, functional performance deficits, and assessments, pt has been identified as a moderate complexity evaluation  From OT standpoint, anticipate d/c home with family support and home OT  Pt to continue to benefit from acute immediate OT services to address the following goals 3-5x/week to  w/in 7-10 days:        OT Discharge Recommendation: Home with home health rehabilitation  OT - OK to Discharge: Yes (When medically appropriate)

## 2022-06-15 NOTE — DISCHARGE INSTRUCTIONS
Weight yourself daily  If you gain more than 3lbs in 24 hours or 5lbs in 5 days then call cardiologist to make appointment

## 2022-06-15 NOTE — PROGRESS NOTES
Progress Note - Critical Care   [facility]  76 y o  male MRN: [id number]  Unit/Bed#: [facility] 413-01 Encounter: [id number]      Attending Physician: Lisa Silver DO    24 Hour Events/HPI: POD # 1 s/p transapical TAVR  Remains on cardene at 8 this morning  ROS: Review of Systems  ---------------------------------------------------------------------------------------------------------------------------------------------------------------------  Impressions:  1  Severe AS s/p Transapical TAVR  2  CAD s/p CABG x 2 (2013)  3  Bilateral carotid stenosis  4  Severe aorto-iliac dx  5  S/p L fem anterior tub bypas (2016)  6  S/P angioplasty bypass graft (2016)  7  Recurrent bypass graft stenosis  8  CKD  9  DM II    Plan:    Neuro:   · Pain controlled with: Tylenol ATC, prn Oxycodone   · Regulate sleep/wake cycle  · Delirium precautions  · CAM-ICU daily  · Trend neuro exam      CV:   · Cardiac infusions: Cardene, 8 mg/hour  · Start home Norvasc 10mg QD  · Wean  as able  · MAP goal > 65 and CI >2 2  · Discontinue arterial line when off cardene  · Rhythm: Sinus Bradycardia  · Follow rhythm on telemetry  · Hold lopressor for bradyccardia    · ASA 81 mg QD  · Statin: Lipitor 80mg qHS  · Plavix 75mg     Lung:   · Acute post-op pulmonary insufficiency; Requiring 3 liters via nasal cannula, secondary to poor inspiratory effort secondary to pain  Continue incentive spirometry/coughing/deep breathing exercises    Wean supplemental oxygen as tolerated for saturation > 90%    · Wean  as tolerated  · Chlorhexidine ordered: yes  · Chest tube output remains persistently high; Continue chest tubes to suction today      GI:   · Continue PPI for stress ulcer prophylaxis  · Continue bowel regimen  · Trend abdominal exam and bowel function    FEN:   · Diuretic plan: Start home demedex 20mg BID   · Nutrition/diet plan: Cardiac  · Replenish electrolytes with goals: K >4 0, Mag >2 0, and Phos >3 0    :   · Indwelling Archuleta present: yes   · Discontinue Archuleta  · Trend UOP and BUN/creat  · Strict I and O    ID:   · Trend temps and WBC count  · Maintain normothermia        Heme:   · Trend hgb and plts    Endo:   · Glycemic control plan: Continue SSI     Results from last 6 Months   Lab Units 03/23/22  0510   HEMOGLOBIN A1C % 5 6     MSK/Skin:  · Mobility goal: OOB to chair  · PT consult: yes  · OT consult: yes  · Frequent turning and pressure off-loading  · Local wound care as needed    Disposition:  Transfer to SD level 1  ---------------------------------------------------------------------------------------------------------------------------------------------------------------------  Allergies: No Known Allergies    Medications:     VTE Pharmacologic Prophylaxis: Heparin  VTE Mechanical Prophylaxis: sequential compression device    Scheduled Meds:   acetaminophen, 975 mg, Q8H  allopurinol, 100 mg, Daily  amLODIPine, 10 mg, Daily  aspirin, 81 mg, Daily  atorvastatin, 80 mg, HS  cefazolin, 2,000 mg, Q8H  clopidogrel, 75 mg, Daily  heparin (porcine), 5,000 Units, Q8H TRENT  insulin lispro, 1-5 Units, TID AC  insulin lispro, 1-5 Units, HS  metoprolol, 2 5 mg, Q6H TRENT  mupirocin, 1 application, G15K TRENT  niCARdipine, ,   pantoprazole, 40 mg, Daily  polyethylene glycol, 17 g, Daily       Continuous Infusions:  niCARdipine, 2 5-15 mg/hr, Last Rate: 8 mg/hr (06/15/22 0635)      PRN Meds:  acetaminophen, 650 mg, Q4H PRN  bisacodyl, 10 mg, Daily PRN  HYDROmorphone, 0 5 mg, Q2H PRN  ondansetron, 4 mg, Q6H PRN  oxyCODONE, 2 5 mg, Q4H PRN  oxyCODONE, 5 mg, Q6H PRN  potassium chloride, 20 mEq, Once PRN  potassium chloride, 20 mEq, Q1H PRN  potassium chloride, 20 mEq, Q30 Min PRN      ---------------------------------------------------------------------------------------------------------------------------------------------------------------------  Vitals: A febrile, sinus claudia 55, /55, 16  94% 3L  Vitals:    06/15/22 0400 06/15/22 0500 06/15/22 0600 06/15/22 0700   BP: 130/62 118/56 119/58 119/55   BP Location: Right arm      Pulse: 63 62 62 55   Resp: 15 21 16 18   Temp: 98 06 °F (36 7 °C) 97 7 °F (36 5 °C) 98 06 °F (36 7 °C) 98 06 °F (36 7 °C)   TempSrc: Bladder      SpO2: 95% 95% 94% 97%   Weight:   74 7 kg (164 lb 10 9 oz)    Height:         Arterial Line:  Arterial Line BP: 138/26  Arterial Line MAP (mmHg): (!) 53 mmHg    Tele Rhythm: Sinus Bradycardia (This was personally reviewed by myself )    Respiratory:  SpO2: SpO2: 97 %  SpO2 Device: O2 Device: Nasal cannula  Nasal Cannula O2 Flow Rate (L/min): 3 L/min    Ventilator:  Respiratory  Report   Lab Data (Last 4 hours)    None         O2/Vent Data (Last 4 hours)    None              Temperature: Temp (24hrs), Av 1 °F (36 2 °C), Min:95 5 °F (35 3 °C), Max:98 06 °F (36 7 °C)  Current: Temperature: 98 06 °F (36 7 °C)    Weights:   Weight (last 2 days)     Date/Time Weight    06/15/22 0600 74 7 (164 68)    22 1159 72 6 (160)        Body mass index is 23 63 kg/m²  Hemodynamic Monitoring:  PAP:    CVP:    CO:    CI:    SVR:      Intake and Outputs:    Intake/Output Summary (Last 24 hours) at 6/15/2022 0710  Last data filed at 6/15/2022 0600  Gross per 24 hour   Intake 2877 92 ml   Output 1415 ml   Net 1462 92 ml     I/O last 24 hours: In: 2877 9 [P O :120; I V :2557 9;  IV Piggyback:200]  Out: 3836 [Urine:1125; Chest Tube:290]    UOP: 90/hour   BM: None in the last 24 hours    Chest tube Output:  Mediastinal tubes: 160 mL/8 hours  290 mL/24 hours          Labs:  Results from last 7 days   Lab Units 06/15/22  0352 22  1454 22  1446   WBC Thousand/uL 10 24*  --   --    HEMOGLOBIN g/dL 9 4*  --  10 6*   I STAT HEMOGLOBIN g/dl  --  10 9*  --    HEMATOCRIT % 28 8*  --  33 5*   HEMATOCRIT, ISTAT %  --  32*  --    PLATELETS Thousands/uL 203  --  189     Results from last 7 days   Lab Units 06/15/22  0352 22  2205 22  1840 22  1454 22  1446 22  1412 06/14/22  1351 06/14/22  1301 06/08/22  0805   SODIUM mmol/L 140  --   --   --  141  --   --   --  140   POTASSIUM mmol/L 4 4 4 7 3 8  --  3 9  --   --   --  4 1   CHLORIDE mmol/L 106  --   --   --  108  --   --   --  105   CO2 mmol/L 29  --   --   --  30  --   --   --  29   CO2, I-STAT mmol/L  --   --   --  32  --  30 31   < >  --    BUN mg/dL 48*  --   --   --  42*  --   --   --  39*   CREATININE mg/dL 2 11*  --   --   --  2 49*  --   --   --  2 39*   CALCIUM mg/dL 8 6  --   --   --  8 6  --   --   --  9 0   ALK PHOS U/L  --   --   --   --   --   --   --   --  75   ALT U/L  --   --   --   --   --   --   --   --  18   AST U/L  --   --   --   --   --   --   --   --  13   GLUCOSE, ISTAT mg/dl  --   --   --  143*  --  126 128   < >  --     < > = values in this interval not displayed  Baseline Creat: 2 3-2 4    Results from last 7 days   Lab Units 06/15/22  0352   MAGNESIUM mg/dL 1 9          Results from last 7 days   Lab Units 06/14/22  1446 06/08/22  0805   INR  1 10 1 04   PTT seconds 33  --                           S%    Micro:   Blood Culture: No results found for: BLOODCX  Urine Culture: No results found for: URINECX  Sputum Culture: No components found for: SPUTUMCX  Wound Culure: No results found for: WOUNDCULT    Diagnositic Studies:  06/15/22 CXR: Tubes lines and drains in place no PTX  This was personally reviewed by myself in PACS   06/15/22 EKG: NSR HR 62 1st degree AV block   This was personally reviewed by myself  Nutrition:        Diet Orders   (From admission, onward)             Start     Ordered    06/14/22 1444  Diet Cardiovascular; Cardiac; Fluid Restriction 1800 ML  Diet effective 1000        References:    Nutrtion Support Algorithm Enteral vs  Parenteral   Question Answer Comment   Diet Type Cardiovascular    Cardiac Cardiac    Other Restriction(s): Fluid Restriction 1800 ML    RD to adjust diet per protocol?  Yes        06/14/22 1443              Physical Exam  Constitutional: General: He is not in acute distress  Appearance: He is not ill-appearing  HENT:      Head: Normocephalic  Mouth/Throat:      Mouth: Mucous membranes are moist    Eyes:      Extraocular Movements: Extraocular movements intact  Pupils: Pupils are equal, round, and reactive to light  Cardiovascular:      Rate and Rhythm: Regular rhythm  Bradycardia present  Pulses: Normal pulses  Comments: Systolic murmur  Med CT in place to suction  Pulmonary:      Effort: Pulmonary effort is normal    Abdominal:      General: Bowel sounds are normal       Palpations: Abdomen is soft  Musculoskeletal:         General: No swelling  Normal range of motion  Cervical back: Neck supple  Skin:     General: Skin is warm  Neurological:      General: No focal deficit present  Mental Status: He is alert and oriented to person, place, and time  Psychiatric:         Mood and Affect: Mood normal        Invasive lines and devices: Invasive Devices  Report    Central Venous Catheter Line  Duration           Introducer 06/14/22 <1 day          Peripheral Intravenous Line  Duration           Peripheral IV 06/14/22 Right;Dorsal (posterior) Hand <1 day    Peripheral IV 06/14/22 Right;Lateral Arm <1 day          Arterial Line  Duration           Arterial Line 06/14/22 Left Radial <1 day          Drain  Duration           Chest Tube 1 Left Posterior;Mediastinal 28 Fr  <1 day    Urethral Catheter Non-latex; Temperature probe 16 Fr  <1 day              ---------------------------------------------------------------------------------------------------------------------------------------------------------------------  Code Status: Level 1 - Full Code    Care Time Delivered:   No Critical Care time spent     Collaborative bedside rounds performed with cardiac surgery attending, critical care attending and bedside RN      August Siu PA-C  DATE: Yolanda 15, 2022  TIME: 7:10 AM

## 2022-06-15 NOTE — OCCUPATIONAL THERAPY NOTE
Occupational Therapy Evaluation     Patient Name: Lori Alaniz  Today's Date: 6/15/2022  Problem List  Principal Problem: Aortic stenosis, severe  Active Problems:    PVD (peripheral vascular disease) ([facility])    Hypertension    Type 2 diabetes mellitus, without long-term current use of insulin ([facility])    GERD (gastroesophageal reflux disease)    Hyperlipidemia    Stage 4 chronic kidney disease ([facility])    S/P TAVR (transcatheter aortic valve replacement)    Past Medical History  Past Medical History:   Diagnosis Date    CAD (coronary artery disease)     Carotid stenosis, asymptomatic, bilateral     Chronic kidney disease     Diabetes ([facility] [address] )     type 2, non-insulin dependent    Diabetes mellitus ([facility])     GERD (gastroesophageal reflux disease)     History of nephrolithiasis     Hyperlipidemia     Hypertension     PAD (peripheral artery disease) ([facility])     Severe aortic stenosis      Past Surgical History  Past Surgical History:   Procedure Laterality Date    APPENDECTOMY      CARDIAC CATHETERIZATION N/A 5/5/2022    Procedure: CARDIAC RHC/LHC; Surgeon: Ce Rahman DO;  Location: [facility]; Service: Cardiology    CARDIAC CATHETERIZATION N/A 5/5/2022    Procedure: Cardiac Coronary Angiogram;  Surgeon: Ce Rahman DO;  Location: [facility]; Service: Cardiology    CARDIAC CATHETERIZATION N/A 5/24/2022    Procedure: Cardiac pci;  Surgeon: Cullen Gregory MD;  Location: [facility] CARDI[facility] C[facility];   Service: Cardiology    CARDIAC CATHETERIZATION N/A 6/14/2022    Procedure: CARDIAC TAVR;  Surgeon: Ritchie Hernandez MD;  Location: [facility] MAIN OR;  Service: Cardiology    COLECTOMY      COLONOSCOPY  2013    CORONARY ARTERY BYPASS GRAFT  2013    X 2    FEMORAL ARTERY - POPLITEAL ARTERY BYPASS GRAFT      HEMORRHOID SURGERY      IR AORTAGRAM WITH RUN-OFF  11/19/2018    [place] SLCTV CATHJ 3RD+ ORD SLCTV ABDL PEL/LXTR [facility] Left 8/12/2016    Procedure: LEFT FEMORAL ARTERIOGRAM; BALLOON ANGIOPLASTY; SFA AND FEMORAL AT VEIN GRAFT;  Surgeon: Jeferson Patel MD;  Location: [facility] MAIN [facility];  Service: Vascular    RI TRANSCATHETER TRANSAPICAL REPLACEMT AORTIC VALVE N/A 6/14/2022    Procedure: REPLACEMENT AORTIC VALVE TRANSCATHETER (TAVR) TRANSAPICAL 29MM IRVIN KYLER S3 ULTRA VALVE(ACCESS ON LEFT) ELANA;  Surgeon: Michelle Joseph DO;  Location: [facility];  Service: Cardiac Surgery    TONSILLECTOMY AND ADENOIDECTOMY               06/15/22 1020   OT Last Visit   OT Visit Date 06/15/22   Note Type   Note type Evaluation   Restrictions/Precautions   Weight Bearing Precautions Per Order No   Other Precautions Multiple lines;Telemetry; Fall Risk;Cardiac/sternal  (CT)   Pain Assessment   Pain Assessment Tool 0-10   Pain Score No Pain   Home Living   Type of [facility] One level;Stairs to enter with rails   Bathroom Shower/Tub Tub/shower unit   Bathroom Toilet Standard   Bathroom Equipment   (denies)   Bathroom Accessibility Accessible   Additional Comments Pt reports living in a 2nd floor apartment with no elevator access     Prior Function   Level of [place] Independent with ADLs and functional mobility   Lives With (S)  Alone   Receives Help From Family   ADL Assistance Independent   IADLs Independent   Falls in the last 6 months 0   Vocational Retired   Lifestyle   Autonomy Pt reports being I with ADLS, IADLS and mobility without device PTA  (+)    Reciprocal Relationships Pt lives alone but reports that his niece lives in jose apartment below him and is able to assist   Service to Others Retired   [address] Enjoys gardening and being with his cats   ADL   Where Assessed Chair   Eating Assistance 7  Independent   Grooming Assistance 5  [address] Mobility   Sit to Supine 4  Minimal assistance   Additional items Assist x 1; Increased time required;Verbal cues;LE management   Transfers   Sit to Stand 4  Minimal assistance   Additional items Assist x 1; Increased time required;Verbal cues   Stand to Sit 4  Minimal assistance   Additional items Assist x 1; Increased time required;Verbal cues   Functional Mobility   Functional Mobility 4  Minimal assistance   Additional Comments Pt demonstrated household mobility with no rest breaks  Additional items Rolling walker   Balance   Static Sitting Fair +   Dynamic Sitting Fair   Static Standing Fair -   Dynamic Standing Fair -   Ambulatory Poor +   Activity Tolerance   Activity Tolerance Patient limited by fatigue   Medical Staff Made Aware Seen with PT 2* medical complexity/ instability   Nurse Made Aware RN confirmed okay to see pt   Cognition   Overall Cognitive Status [facility]   Arousal/Participation Alert; Cooperative   Attention Attends with cues to redirect   Orientation Level Oriented X4   Memory Decreased recall of precautions   Following Commands Follows one step commands without difficulty   Comments Pt requires some VC for safety and correct technique throughout, but is overall cooperative  Assessment   Limitation Decreased ADL status; Decreased endurance;Decreased self-care trans;Decreased high-level ADLs   Prognosis Good   Assessment Pt is a 76 y o  male admitted to [facility] on 6/14/2022 w/ severe aortic stenosis s/p TAVR "bioprosthetic valve via transapical approach" on 6/14/2022  Pt  has a past medical history of CAD (coronary artery disease), Carotid stenosis, asymptomatic, bilateral, Chronic kidney disease, Diabetes ([address] ), Diabetes mellitus ([address] ), GERD (gastroesophageal reflux disease), History of nephrolithiasis, Hyperlipidemia, Hypertension, PAD (peripheral artery disease) ([address] ), and Severe aortic stenosis  Pt with active OT orders and up with assistance  orders   Pt resides in a 2nd floor apartment with no elevator access  Pt lives alone, but reports that his niece lives in the same building and is able to assist upon d/c  Pt was I w/  ADLS and IADLS, (+) drove, & required no use of DME PTA  Currently pt is min A for functional transfers, functional mobility, and ADLS overall  Pt is limited at this time 2*: endurance, activity tolerance, functional mobility, forward functional reach, functional standing tolerance and decreased I w/ ADLS/IADLS  The following Occupational Performance Areas to address include: grooming, bathing/shower, toilet hygiene, functional mobility and clothing management  Based on the aforementioned OT evaluation, functional performance deficits, and assessments, pt has been identified as a moderate complexity evaluation  From OT standpoint, anticipate d/c home with family support and home OT  Pt to continue to benefit from acute immediate OT services to address the following goals 3-5x/week to  w/in 7-10 days: Samaria Ramirez Goals   Patient Goals To feel better   LTG Time Frame 10-14   Long Term Goal #1 See goals below   Plan   Treatment Interventions ADL retraining;Functional transfer training; Endurance training;Patient/family training;Equipment evaluation/education; Compensatory technique education;Continued evaluation; Energy conservation; Activityengagement   Goal Expiration Date 22   OT Frequency 3-5x/wk   Recommendation   OT Discharge Recommendation Home with home health rehabilitation   OT - OK to Discharge Yes  (When medically appropriate)   AM-PAC Daily Activity Inpatient   Lower Body Dressing 3   Bathing 3   Toileting 3   Upper Body Dressing 3   Grooming 4   Eating 4   Daily Activity Raw Score 20   Daily Activity Standardized Score (Calc for Raw Score >=11) 42 03   AM-PAC Applied Cognition Inpatient   Following a Speech/Presentation 3   Understanding Ordinary Conversation 4   Taking Medications 3   Remembering Where Things Are Placed or Put Away 4   Remembering List of 4-5 Errands 4 Taking Care of Complicated Tasks 3   Applied Cognition Raw Score 21   Applied Cognition Standardized Score 44 3   Modified [place] Scale   Modified Rosa Scale 4         GOALS    1) Pt will increase activity tolerance to G for 30 min txment sessions    2) Pt will complete UB/LB dressing/self care w/ mod I using adaptive device and DME as needed    3) Pt will complete bathing w/ Mod I w/ use of AE and DME as needed    4) Pt will complete toileting w/ mod I w/ G hygiene/thoroughness using DME as needed    5) Pt will improve functional transfers to Mod I on/off all surfaces using DME as needed w/ G balance/safety     6) Pt will improve functional mobility during ADL/IADL/leisure tasks to Mod I using DME as needed w/ G balance/safety     7) Pt will demonstrate G carryover of pt/caregiver education and training as appropriate w/ mod I     8) Pt will engage in cardiac education using the Recovering After Cardiac Surgery packet w/ G participation and G carryover  9) Pt will demonstrate 100% carryover of precautions s/p review w/ mod I w/ G tolerance/participation t/o functional ADL/IADL/leisure tasks      10) Pt will independently identify and utilize 2-3 coping strategies to increase positive affect and promote overall well-being      11) Pt will engage in ongoing cognitive assessment w/ G participation to assist w/ safe d/c planning/recommendations (as needed)    DESTINEE Cope, OTR/L

## 2022-06-15 NOTE — QUICK NOTE
Home medication list personally confirmed with [facility] [phone]:    Allopurinol 100mg daily  Torsemide 20 mg TID (last filled in March)  Plavix 75 mg daily    Mail order last filled for 90 days in March:  Pantoprazole 40 mg daily  Amlodipine 10 mg daily

## 2022-06-16 LAB
ANION GAP SERPL CALCULATED.3IONS-SCNC: 3 MMOL/L (ref 4–13)
BUN SERPL-MCNC: 46 MG/DL (ref 5–25)
CALCIUM SERPL-MCNC: 8.5 MG/DL (ref 8.3–10.1)
CHLORIDE SERPL-SCNC: 108 MMOL/L (ref 100–108)
CO2 SERPL-SCNC: 30 MMOL/L (ref 21–32)
CREAT SERPL-MCNC: 2.16 MG/DL (ref 0.6–1.3)
ERYTHROCYTE [DISTWIDTH] IN BLOOD BY AUTOMATED COUNT: 15.2 % (ref 11.6–15.1)
GFR SERPL CREATININE-BSD FRML MDRD: 28 ML/MIN/1.73SQ M
GLUCOSE SERPL-MCNC: 143 MG/DL (ref 65–140)
GLUCOSE SERPL-MCNC: 158 MG/DL (ref 65–140)
GLUCOSE SERPL-MCNC: 176 MG/DL (ref 65–140)
GLUCOSE SERPL-MCNC: 203 MG/DL (ref 65–140)
GLUCOSE SERPL-MCNC: 237 MG/DL (ref 65–140)
HCT VFR BLD AUTO: 27.6 % (ref 36.5–49.3)
HGB BLD-MCNC: 8.8 G/DL (ref 12–17)
MCH RBC QN AUTO: 27.3 PG (ref 26.8–34.3)
MCHC RBC AUTO-ENTMCNC: 31.9 G/DL (ref 31.4–37.4)
MCV RBC AUTO: 86 FL (ref 82–98)
PLATELET # BLD AUTO: 143 THOUSANDS/UL (ref 149–390)
PMV BLD AUTO: 10.2 FL (ref 8.9–12.7)
POTASSIUM SERPL-SCNC: 3.7 MMOL/L (ref 3.5–5.3)
RBC # BLD AUTO: 3.22 MILLION/UL (ref 3.88–5.62)
SODIUM SERPL-SCNC: 141 MMOL/L (ref 136–145)
WBC # BLD AUTO: 8.51 THOUSAND/UL (ref 4.31–10.16)

## 2022-06-16 PROCEDURE — 94762 N-INVAS EAR/PLS OXIMTRY CONT: CPT

## 2022-06-16 PROCEDURE — NC001 PR NO CHARGE

## 2022-06-16 PROCEDURE — 82948 REAGENT STRIP/BLOOD GLUCOSE: CPT

## 2022-06-16 PROCEDURE — 85027 COMPLETE CBC AUTOMATED: CPT | Performed by: THORACIC SURGERY (CARDIOTHORACIC VASCULAR SURGERY)

## 2022-06-16 PROCEDURE — 99233 SBSQ HOSP IP/OBS HIGH 50: CPT | Performed by: INTERNAL MEDICINE

## 2022-06-16 PROCEDURE — 99232 SBSQ HOSP IP/OBS MODERATE 35: CPT | Performed by: NURSE PRACTITIONER

## 2022-06-16 PROCEDURE — 97530 THERAPEUTIC ACTIVITIES: CPT

## 2022-06-16 PROCEDURE — 80048 BASIC METABOLIC PNL TOTAL CA: CPT | Performed by: THORACIC SURGERY (CARDIOTHORACIC VASCULAR SURGERY)

## 2022-06-16 PROCEDURE — 99231 SBSQ HOSP IP/OBS SF/LOW 25: CPT | Performed by: THORACIC SURGERY (CARDIOTHORACIC VASCULAR SURGERY)

## 2022-06-16 RX ORDER — POTASSIUM CHLORIDE 20 MEQ/1
20 TABLET, EXTENDED RELEASE ORAL 2 TIMES DAILY
Status: DISCONTINUED | OUTPATIENT
Start: 2022-06-16 | End: 2022-06-17 | Stop reason: HOSPADM

## 2022-06-16 RX ORDER — LISINOPRIL 10 MG/1
10 TABLET ORAL DAILY
Status: DISCONTINUED | OUTPATIENT
Start: 2022-06-16 | End: 2022-06-17

## 2022-06-16 RX ADMIN — HEPARIN SODIUM 5000 UNITS: 5000 INJECTION INTRAVENOUS; SUBCUTANEOUS at 21:46

## 2022-06-16 RX ADMIN — TORSEMIDE 20 MG: 20 TABLET ORAL at 17:00

## 2022-06-16 RX ADMIN — POTASSIUM CHLORIDE 20 MEQ: 1500 TABLET, EXTENDED RELEASE ORAL at 08:30

## 2022-06-16 RX ADMIN — ACETAMINOPHEN 975 MG: 325 TABLET, FILM COATED ORAL at 15:07

## 2022-06-16 RX ADMIN — HEPARIN SODIUM 5000 UNITS: 5000 INJECTION INTRAVENOUS; SUBCUTANEOUS at 06:03

## 2022-06-16 RX ADMIN — INSULIN LISPRO 1 UNITS: 100 INJECTION, SOLUTION INTRAVENOUS; SUBCUTANEOUS at 21:47

## 2022-06-16 RX ADMIN — MUPIROCIN 1 APPLICATION: 20 OINTMENT TOPICAL at 08:30

## 2022-06-16 RX ADMIN — INSULIN LISPRO 2 UNITS: 100 INJECTION, SOLUTION INTRAVENOUS; SUBCUTANEOUS at 17:00

## 2022-06-16 RX ADMIN — AMLODIPINE BESYLATE 10 MG: 10 TABLET ORAL at 08:29

## 2022-06-16 RX ADMIN — ALLOPURINOL 100 MG: 100 TABLET ORAL at 08:30

## 2022-06-16 RX ADMIN — ATORVASTATIN CALCIUM 80 MG: 80 TABLET, FILM COATED ORAL at 21:46

## 2022-06-16 RX ADMIN — ACETAMINOPHEN 975 MG: 325 TABLET, FILM COATED ORAL at 21:45

## 2022-06-16 RX ADMIN — POLYETHYLENE GLYCOL 3350 17 G: 17 POWDER, FOR SOLUTION ORAL at 08:29

## 2022-06-16 RX ADMIN — INSULIN LISPRO 1 UNITS: 100 INJECTION, SOLUTION INTRAVENOUS; SUBCUTANEOUS at 06:08

## 2022-06-16 RX ADMIN — NICARDIPINE HYDROCHLORIDE 7 MG/HR: 2.5 INJECTION, SOLUTION INTRAVENOUS at 04:14

## 2022-06-16 RX ADMIN — INSULIN LISPRO 1 UNITS: 100 INJECTION, SOLUTION INTRAVENOUS; SUBCUTANEOUS at 10:43

## 2022-06-16 RX ADMIN — CLOPIDOGREL BISULFATE 75 MG: 75 TABLET ORAL at 08:29

## 2022-06-16 RX ADMIN — ACETAMINOPHEN 975 MG: 325 TABLET, FILM COATED ORAL at 06:03

## 2022-06-16 RX ADMIN — LISINOPRIL 10 MG: 10 TABLET ORAL at 08:30

## 2022-06-16 RX ADMIN — HEPARIN SODIUM 5000 UNITS: 5000 INJECTION INTRAVENOUS; SUBCUTANEOUS at 15:06

## 2022-06-16 RX ADMIN — POTASSIUM CHLORIDE 20 MEQ: 1500 TABLET, EXTENDED RELEASE ORAL at 17:00

## 2022-06-16 RX ADMIN — MUPIROCIN 1 APPLICATION: 20 OINTMENT TOPICAL at 21:46

## 2022-06-16 RX ADMIN — DOCUSATE SODIUM 100 MG: 100 CAPSULE, LIQUID FILLED ORAL at 08:30

## 2022-06-16 RX ADMIN — PANTOPRAZOLE SODIUM 40 MG: 40 TABLET, DELAYED RELEASE ORAL at 08:29

## 2022-06-16 RX ADMIN — ASPIRIN 81 MG CHEWABLE TABLET 81 MG: 81 TABLET CHEWABLE at 08:29

## 2022-06-16 RX ADMIN — DOCUSATE SODIUM 100 MG: 100 CAPSULE, LIQUID FILLED ORAL at 17:00

## 2022-06-16 RX ADMIN — TORSEMIDE 20 MG: 20 TABLET ORAL at 08:30

## 2022-06-16 NOTE — PLAN OF CARE
Problem: MOBILITY - ADULT  Goal: Maintain or return to baseline ADL function  Description: INTERVENTIONS:  -  Assess patient's ability to carry out ADLs; assess patient's baseline for ADL function and identify physical deficits which impact ability to perform ADLs (bathing, care of mouth/teeth, toileting, grooming, dressing, etc )  - Assess/evaluate cause of self-care deficits   - Assess range of motion  - Assess patient's mobility; develop plan if impaired  - Assess patient's need for assistive devices and provide as appropriate  - Encourage maximum independence but intervene and supervise when necessary  - Involve family in performance of ADLs  - Assess for home care needs following discharge   - Consider OT consult to assist with ADL evaluation and planning for discharge  - Provide patient education as appropriate  Outcome: Progressing  Goal: Maintains/Returns to pre admission functional level  Description: INTERVENTIONS:  - Perform BMAT or MOVE assessment daily    - Set and communicate daily mobility goal to care team and patient/family/caregiver  - Collaborate with rehabilitation services on mobility goals if consulted  - Perform Range of Motion 4 times a day  - Reposition patient every 2 hours    - Dangle patient 4 times a day  - Stand patient 4 times a day  - Ambulate patient 4 times a day  - Out of bed to chair 3 times a day   - Out of bed for meals 3 times a day  - Out of bed for toileting  - Record patient progress and toleration of activity level   Outcome: Progressing     Problem: Potential for Falls  Goal: Patient will remain free of falls  Description: INTERVENTIONS:  - Educate patient/family on patient safety including physical limitations  - Instruct patient to call for assistance with activity   - Consult OT/PT to assist with strengthening/mobility   - Keep Call bell within reach  - Keep bed low and locked with side rails adjusted as appropriate  - Keep care items and personal belongings within reach  - Initiate and maintain comfort rounds  - Make Fall Risk Sign visible to staff  - Offer Toileting every 2 Hours, in advance of need  - Initiate/Maintain bed alarm  - Obtain necessary fall risk management equipment:   - Apply yellow socks and bracelet for high fall risk patients  - Consider moving patient to room near nurses station  Outcome: Progressing     Problem: Prexisting or High Potential for Compromised Skin Integrity  Goal: Skin integrity is maintained or improved  Description: INTERVENTIONS:  - Identify patients at risk for skin breakdown  - Assess and monitor skin integrity  - Assess and monitor nutrition and hydration status  - Monitor labs   - Assess for incontinence   - Turn and reposition patient  - Assist with mobility/ambulation  - Relieve pressure over bony prominences  - Avoid friction and shearing  - Provide appropriate hygiene as needed including keeping skin clean and dry  - Evaluate need for skin moisturizer/barrier cream  - Collaborate with interdisciplinary team   - Patient/family teaching  - Consider wound care consult   Outcome: Progressing     Problem: CARDIOVASCULAR - ADULT  Goal: Absence of cardiac dysrhythmias or at baseline rhythm  Description: INTERVENTIONS:  - Continuous cardiac monitoring, vital signs, obtain 12 lead EKG if ordered  - Administer antiarrhythmic and heart rate control medications as ordered  - Monitor electrolytes and administer replacement therapy as ordered  Outcome: Progressing

## 2022-06-16 NOTE — PROGRESS NOTES
Progress Note - Cardiothoracic Surgery   Lori Alaniz  76 y o  male MRN: [id number]  Unit/Bed#: [facility] 413-01 Encounter: [id number]      POD # 2 s/p TA-TAVR    Pt seen/examined  Interval history and data reviewed with critical care team   Pt doing well  No specific complaints          Medications:   Scheduled Meds:  Current Facility-Administered Medications   Medication Dose Route Frequency Provider Last Rate    acetaminophen  650 mg Rectal Q4H PRN Darenjoseline Cano PA-C      acetaminophen  975 mg Oral [address] [place], [place]      allopurinol  100 mg Oral Daily [place], [place]      amLODIPine  10 mg Oral Daily ZHANNA George      aspirin  81 mg Oral Daily [place], [place]      [facility]  [facility]      bis[facility]odyl  10 mg Rectal [facility]      clopidogrel  75 mg Oral Daily [place], [place]      docusate sodium  100 mg Oral BID [facility]      heparin (porcine)  5,000 Units Subcutaneous [place], [place]      HYDROmorphone  0 5 mg Intravenous Q2H PRN [facility]      insulin lispro  1-5 Units Subcutaneous TID AC Martha [place] Robles      insulin lispro  1-5 Units Subcutaneous HS [facility]      metoprolol tartrate  25 mg Oral Q12H [address] [place] hernandez      mupirocin  1 application Nasal D78R [address] [place]      niCARdipine  2 5-15 mg/hr Intravenous Titrated Kobe Jailyn Henderson PA-C 7 mg/hr (06/16/22 0414)    ondansetron  4 mg Intravenous Q6H [facility]N [facility]      oxyCODONE  2 5 mg Oral Q4H PRN [facility], [place]      oxyCODONE  5 mg Oral Q6H PRN [facility]      pantoprazole  40 mg Oral Daily [facility]      polyethylene glycol  17 g Oral Daily [place], [place]      potassium chloride  20 mEq Oral BID Kevyn Atwood PA-C      potassium chloride  20 mEq Intravenous Once PRN Daren Cano PA-C      potassium chloride  20 mEq Intravenous Q1H PRN [facility], [place] 20 mEq (06/14/22 2036)    potassium chloride  20 mEq Intravenous Q30 Min PRN [facility], [place]      torsemide  20 mg Oral BID Yanteh Wilson PA-C       Continuous Infusions:niCARdipine, 2 5-15 mg/hr, Last Rate: 7 mg/hr (06/16/22 0414)      PRN Meds:   acetaminophen    bisacodyl    HYDROmorphone    ondansetron    oxyCODONE    oxyCODONE    potassium chloride    potassium chloride    potassium chloride    Vitals: Blood pressure 116/56, pulse 62, temperature 98 2 °F (36 8 °C), temperature source Oral, resp  rate (!) 26, height 5' 10" (1 778 m), weight 63 5 kg (139 lb 15 9 oz), SpO2 95 %  ,Body mass index is 20 09 kg/m²  I/O last 24 hours: In: 2216 3 [P O :840; I V :1326 3; IV Piggyback:50]  Out: 1925 [Urine:1805;  Chest Tube:120]  Invasive Devices  Report    Central Venous Catheter Line  Duration           Introducer 06/14/22 1 day          Peripheral Intravenous Line  Duration           Peripheral IV 06/14/22 Right;Dorsal (posterior) Hand 1 day    Peripheral IV 06/14/22 Right;Lateral Arm 1 day          Arterial Line  Duration           Arterial Line 06/14/22 Left Radial 1 day          Drain  Duration           Chest Tube 1 Left Posterior;Mediastinal 28 Fr  1 day                  Lab, Imaging and other studies:   Results from last 7 days   Lab Units 06/16/22  0328 06/15/22  0352 06/14/22  1454 06/14/22  1446   WBC Thousand/uL 8 51 10 24*  --   --    HEMOGLOBIN g/dL 8 8* 9 4*  --  10 6*   I STAT HEMOGLOBIN g/dl  --   --  10 9*  --    HEMATOCRIT % 27 6* 28 8*  --  33 5*   HEMATOCRIT, ISTAT %  --   --  32*  --    PLATELETS Thousands/uL 143* 203  --  189     Results from last 7 days   Lab Units 06/16/22  0328 06/15/22  0352 06/14/22  2205 06/14/22  1840 06/14/22  1454 06/14/22  1446   POTASSIUM mmol/L 3 7 4 4 4 7   < >  --  3 9   CHLORIDE mmol/L 108 106  --   --   --  108   CO2 mmol/L 30 29  --   --   --  30   CO2, I-STAT mmol/L  --   -- --   --  32  --    BUN mg/dL 46* 48*  --   --   --  42*   CREATININE mg/dL 2 16* 2 11*  --   --   --  2 49*   GLUCOSE, ISTAT mg/dl  --   --   --   --  143*  --    CALCIUM mg/dL 8 5 8 6  --   --   --  8 6    < > = values in this interval not displayed  Results from last 7 days   Lab Units 06/14/22  1446   INR  1 10   PTT seconds 33     No results for input(s): PHART, DLM0LMV, PO2ART, DBE9MDG, E2UNXTZO, BEART in the last 72 hours  Plan:    D/c chest tubes  Ambulate  Incentive spirometry  Diuresis  Add antihypertensives  Wean cardene off      Anticipate d/c in AM        SIGNATURE: Holly Lester DO  DATE: June 16, 2022  TIME: 7:16 AM

## 2022-06-16 NOTE — PLAN OF CARE
Problem: MOBILITY - ADULT  Goal: Maintain or return to baseline ADL function  Description: INTERVENTIONS:  -  Assess patient's ability to carry out ADLs; assess patient's baseline for ADL function and identify physical deficits which impact ability to perform ADLs (bathing, care of mouth/teeth, toileting, grooming, dressing, etc )  - Assess/evaluate cause of self-care deficits   - Assess range of motion  - Assess patient's mobility; develop plan if impaired  - Assess patient's need for assistive devices and provide as appropriate  - Encourage maximum independence but intervene and supervise when necessary  - Involve family in performance of ADLs  - Assess for home care needs following discharge   - Consider OT consult to assist with ADL evaluation and planning for discharge  - Provide patient education as appropriate  Outcome: Progressing  Goal: Maintains/Returns to pre admission functional level  Description: INTERVENTIONS:  - Perform BMAT or MOVE assessment daily    - Set and communicate daily mobility goal to care team and patient/family/caregiver  - Collaborate with rehabilitation services on mobility goals if consulted  - Perform Range of Motion 2 times a day  - Reposition patient every 2 hours    - Dangle patient 2 times a day  - Stand patient 2 times a day  - Ambulate patient 2 times a day  - Out of bed to chair 2 times a day   - Out of bed for meals 2 times a day  - Out of bed for toileting  - Record patient progress and toleration of activity level   Outcome: Progressing     Problem: CARDIOVASCULAR - ADULT  Goal: Absence of cardiac dysrhythmias or at baseline rhythm  Description: INTERVENTIONS:  - Continuous cardiac monitoring, vital signs, obtain 12 lead EKG if ordered  - Administer antiarrhythmic and heart rate control medications as ordered  - Monitor electrolytes and administer replacement therapy as ordered  Outcome: Progressing

## 2022-06-16 NOTE — PROGRESS NOTES
Progress Note - Critical Care   Donna Glover  76 y o  male MRN: [id number]  Unit/Bed#: [facility] 413-01 Encounter: [id number]      Attending Physician: Komal Tamez DO    24 Hour Events/HPI: POD # 2 s/p TA TAVR  Echocardiogram performed: EF 60% with grade 1 diastolic dysfunction  Valve well-seated, no PVL, no transvalvular regurgitation  Remains on cardene, currently at 7 mg/hr  ROS: Review of Systems   Respiratory: Negative for cough and shortness of breath  Cardiovascular: Positive for chest pain (Incision site)  Negative for leg swelling  Gastrointestinal: Negative for abdominal pain  Genitourinary: Negative for difficulty urinating      ---------------------------------------------------------------------------------------------------------------------------------------------------------------------  Impressions:  1  Severe AS s/p TA TAVR  2  CAD s/p CABG x 2 (2013), BURAK to RCA (5/2022)  3  CKD  4  HDN  5  Severe PAD    Plan:    Neuro:   · Pain controlled with: Tylenol ATC and PRN oxycodone/dilaudid  · Regulate sleep/wake cycle  · Delirium precautions  · CAM-ICU daily  · Trend neuro exam    CV:   · Cardiac infusions: Cardene, 7 mg/hour  · Wean cardene as able  · Amlodipine 10 mg daily   · MAP goal > 65 and SBP < 130  · Keep arterial line  · Rhythm: Sinus Bradycardia  · Follow rhythm on telemetry  · Lopressor 25 mg PO BID - consider transitioning to coreg  · ASA 81 mg QD, Plavix 75 mg QD  · Statin: Lipitor 80mg qHS    Lung:   · Acute post-op pulmonary insufficiency; Requiring 4 liters via nasal cannla, secondary to poor inspiratory effort secondary to pain  Continue incentive spirometry/coughing/deep breathing exercises  Wean supplemental oxygen as tolerated for saturation > 90%  · Chest tube drainage diminished; D/C today    GI:   · Continue PPI for stress ulcer prophylaxis  · Continue bowel regimen  · Trend abdominal exam and bowel function    FEN:   · Diuretic plan:  Torsemide 20 mg BID  · Add K-dur 20 mEQ PO q BID  · Nutrition/diet plan: Cardiac diet  · Replenish electrolytes with goals: K >4 0, Mag >2 0, and Phos >3 0    :   · Indwelling Archuleta present: no   · Trend UOP and BUN/creat  · Strict I and O    ID:   · Trend temps and WBC count  · Maintain normothermia        Heme:   · Trend hgb and plts    Endo:   · Glycemic control plan: Glucose well-controlled   Insulin sliding scale coverage    Results from last 6 Months   Lab Units 03/23/22  0510   HEMOGLOBIN A1C % 5 6     MSK/Skin:  · Mobility goal: OOB to chair, ambulate with assistance  · PT consult: yes  · OT consult: yes  · Frequent turning and pressure off-loading  · Local wound care as needed    Disposition:  SD level 1  ---------------------------------------------------------------------------------------------------------------------------------------------------------------------  Allergies: No Known Allergies    Medications:     VTE Pharmacologic Prophylaxis: Heparin  VTE Mechanical Prophylaxis: sequential compression device    Scheduled Meds:   acetaminophen, 975 mg, Q8H  allopurinol, 100 mg, Daily  amLODIPine, 10 mg, Daily  aspirin, 81 mg, Daily  atorvastatin, 80 mg, HS  clopidogrel, 75 mg, Daily  docusate sodium, 100 mg, BID  heparin (porcine), 5,000 Units, Q8H TRENT  insulin lispro, 1-5 Units, TID AC  insulin lispro, 1-5 Units, HS  metoprolol tartrate, 25 mg, Q12H TRENT  mupirocin, 1 application, Z45X TRENT  pantoprazole, 40 mg, Daily  polyethylene glycol, 17 g, Daily  torsemide, 20 mg, BID       Continuous Infusions:  niCARdipine, 2 5-15 mg/hr, Last Rate: 7 mg/hr (06/16/22 5049)      PRN Meds:  acetaminophen, 650 mg, Q4H PRN  bisacodyl, 10 mg, Daily PRN  HYDROmorphone, 0 5 mg, Q2H PRN  ondansetron, 4 mg, Q6H PRN  oxyCODONE, 2 5 mg, Q4H PRN  oxyCODONE, 5 mg, Q6H PRN  potassium chloride, 20 mEq, Once PRN  potassium chloride, 20 mEq, Q1H PRN  potassium chloride, 20 mEq, Q30 Min PRN      ---------------------------------------------------------------------------------------------------------------------------------------------------------------------  Vitals:   Vitals:    22 0400 22 0500 22 0546 22 0600   BP: 125/59   116/56   BP Location: Right arm      Pulse: 62 63  62   Resp: (!) 33 (!) 24  (!) 26   Temp: 98 2 °F (36 8 °C)      TempSrc: Oral      SpO2: 95% 90%  95%   Weight:   63 5 kg (139 lb 15 9 oz)    Height:         Arterial Line:  Arterial Line BP: 132/27  Arterial Line MAP (mmHg): (!) 58 mmHg    Tele Rhythm: Sinus Bradycardia (This was personally reviewed by myself )    Respiratory:  SpO2: SpO2: 95 %  SpO2 Device: O2 Device: Nasal cannula  Nasal Cannula O2 Flow Rate (L/min): 4 L/min    Temperature: Temp (24hrs), Av °F (36 7 °C), Min:97 5 °F (36 4 °C), Max:98 24 °F (36 8 °C)  Current: Temperature: 98 2 °F (36 8 °C)    Weights:   Weight (last 2 days)     Date/Time Weight    22 0546 63 5 (139 99)    06/15/22 1143 74 4 (164)    06/15/22 0600 74 7 (164 68)    22 1159 72 6 (160)        Body mass index is 20 09 kg/m²  Intake and Outputs:    Intake/Output Summary (Last 24 hours) at 2022 0643  Last data filed at 2022 0600  Gross per 24 hour   Intake 2216 25 ml   Output 1925 ml   Net 291 25 ml     I/O last 24 hours: In: 3293 [P O :840;  I V :2265; IV Piggyback:250]  Out: 3000 [Urine:2635; Chest Tube:365]    BM: 0 in the last 24 hours    Chest tube Output:  Apical chest tube: 90 mL/8 hours  120 mL/24 hours          Labs:  Results from last 7 days   Lab Units 06/16/22  0328 06/15/22  0352 22  1454 22  1446   WBC Thousand/uL 8 51 10 24*  --   --    HEMOGLOBIN g/dL 8 8* 9 4*  --  10 6*   I STAT HEMOGLOBIN g/dl  --   --  10 9*  --    HEMATOCRIT % 27 6* 28 8*  --  33 5*   HEMATOCRIT, ISTAT %  --   --  32*  --    PLATELETS Thousands/uL 143* 203  --  189     Results from last 7 days   Lab Units 06/16/22  0328 06/15/22  035 06/14/22  2205 06/14/22  1840 06/14/22  1454 06/14/22  1446 06/14/22  1412 06/14/22  1351   SODIUM mmol/L 141 140  --   --   --  141  --   --    POTASSIUM mmol/L 3 7 4 4 4 7   < >  --  3 9  --   --    CHLORIDE mmol/L 108 106  --   --   --  108  --   --    CO2 mmol/L 30 29  --   --   --  30  --   --    CO2, I-STAT mmol/L  --   --   --   --  32  --  30 31   BUN mg/dL 46* 48*  --   --   --  42*  --   --    CREATININE mg/dL 2 16* 2 11*  --   --   --  2 49*  --   --    CALCIUM mg/dL 8 5 8 6  --   --   --  8 6  --   --    GLUCOSE, ISTAT mg/dl  --   --   --   --  143*  --  126 128    < > = values in this interval not displayed  Baseline Creat: 2 3-2 4    Results from last 7 days   Lab Units 06/15/22  0352   MAGNESIUM mg/dL 1 9     Diagnositic Studies:  Echo as above    Nutrition:        Diet Orders   (From admission, onward)             Start     Ordered    06/15/22 0804  Diet Cardiac  Diet effective now        References:    Nutrtion Support Algorithm Enteral vs  Parenteral   Question Answer Comment   Diet Type Cardiac    RD to adjust diet per protocol? Yes        06/15/22 0804              Physical Exam  Constitutional:       General: He is not in acute distress  HENT:      Head: Atraumatic  Mouth/Throat:      Mouth: Mucous membranes are moist    Cardiovascular:      Rate and Rhythm: Regular rhythm  Bradycardia present  Pulses: Decreased pulses  Pulmonary:      Effort: No respiratory distress  Breath sounds: No wheezing, rhonchi or rales  Chest:      Comments: Left sided chest tube to suction without air leak  Abdominal:      General: Bowel sounds are normal  There is no distension  Palpations: Abdomen is soft  Tenderness: There is no abdominal tenderness  Musculoskeletal:      Cervical back: Neck supple  Right lower leg: No edema  Left lower leg: No edema  Skin:     General: Skin is warm and dry  Capillary Refill: Capillary refill takes 2 to 3 seconds     Neurological: General: No focal deficit present  Mental Status: He is alert and oriented to person, place, and time  Invasive lines and devices: Invasive Devices  Report    Central Venous Catheter Line  Duration           Introducer 06/14/22 1 day          Peripheral Intravenous Line  Duration           Peripheral IV 06/14/22 Right;Dorsal (posterior) Hand 1 day    Peripheral IV 06/14/22 Right;Lateral Arm 1 day          Arterial Line  Duration           Arterial Line 06/14/22 Left Radial 1 day          Drain  Duration           Chest Tube 1 Left Posterior;Mediastinal 28 Fr  1 day              ---------------------------------------------------------------------------------------------------------------------------------------------------------------------  Code Status: Level 1 - Full Code    Care Time Delivered:   No Critical Care time spent     Collaborative bedside rounds performed with cardiac surgery attending, critical care attending and bedside RN      SIGNATURE: Massie Ganser, PA-C  DATE: June 16, 2022  TIME: 6:43 AM

## 2022-06-16 NOTE — PLAN OF CARE
Problem: PHYSICAL THERAPY ADULT  Goal: Performs mobility at highest level of function for planned discharge setting  See evaluation for individualized goals  Description: Treatment/Interventions: LE strengthening/ROM, Functional transfer training, Elevations, Therapeutic exercise, Endurance training, Equipment eval/education, Bed mobility, Gait training, Spoke to nursing, Spoke to case management, OT  Equipment Recommended: Kia Munoz       See flowsheet documentation for full assessment, interventions and recommendations  Outcome: Progressing  Note: Prognosis: Good  Problem List: Decreased strength, Decreased endurance, Impaired balance, Decreased mobility  Assessment: Pt cont to demonstrate general weakness, deconditioning, min balance impairment and gait dysfunction and inconsistent safety awareness w/ min (A) required for transfers and amb w/ rw; will cont to follow pt in PT to max functional (I), endurance and safety; otherwise, cont to anticipate pt will return home upon D/C pending additional progress and when medically cleared; home PT follow up is recommended; will follow  Barriers to Discharge: Inaccessible home environment, Decreased caregiver support        PT Discharge Recommendation: Home with home health rehabilitation (home PT)          See flowsheet documentation for full assessment

## 2022-06-16 NOTE — PROGRESS NOTES
Procedure: Chest Tube Removal    06/16/22    Mediastinal CT x 1  d/c'd in typical fashion  Patient tolerated procedure well  No immediate complications  Site dressed with Acticoat dressing   Patient's nurse was made aware of removal      ZHANNA Taylor

## 2022-06-16 NOTE — PHYSICAL THERAPY NOTE
PHYSICAL THERAPY NOTE          Patient Name: El Thomas  Today's Date: 6/16/2022 06/16/22 1445   PT Last Visit   PT Visit Date 06/16/22   Note Type   Note Type Treatment   Pain Assessment   Pain Assessment Tool 0-10   Pain Score No Pain   Restrictions/Precautions   Other Precautions Cardiac/sternal;Multiple lines;Telemetry;O2  (a-line)   General   Chart Reviewed Yes   Additional Pertinent History cleared for Tx session (spoke to jaimie)   Response to Previous Treatment Patient with no complaints from previous session  Cognition   Overall Cognitive Status WFL   Arousal/Participation Alert; Cooperative   Attention Within functional limits   Orientation Level Oriented to person;Oriented to place;Oriented to situation   Memory Decreased recall of precautions   Following Commands Follows one step commands without difficulty   Subjective   Subjective Alert; in the chair; agreeable to mobilize   Transfers   Sit to Stand 4  Minimal assistance   Additional items Assist x 1;Verbal cues   Stand to Sit 4  Minimal assistance   Additional items Assist x 1;Verbal cues   Ambulation/Elevation   Gait pattern Excessively slow; Short stride; Inconsistent gayatri   Gait Assistance 4  Minimal assist   Additional items Assist x 1;Verbal cues; Tactile cues   Assistive Device Rolling walker   Distance 150 ft   Balance   Static Sitting Fair   Dynamic Sitting Fair -   Static Standing Fair -   Dynamic Standing Poor +   Ambulatory Poor +   Activity Tolerance   Activity Tolerance Patient limited by fatigue   Nurse Made Aware spoke to TOMASZ Conn and Larry Mercado RN   Exercises   Knee AROM Long Arc Quad Sitting;20 reps;AROM; Bilateral   Ankle Pumps Sitting;20 reps;AROM; Bilateral   Marching Sitting;10 reps;AROM; Bilateral   Assessment   Prognosis Good   Problem List Decreased strength;Decreased endurance; Impaired balance;Decreased mobility   Assessment Pt cont to demonstrate general weakness, deconditioning, min balance impairment and gait dysfunction and inconsistent safety awareness w/ min (A) required for transfers and amb w/ rw; will cont to follow pt in PT to max functional (I), endurance and safety; otherwise, cont to anticipate pt will return home upon D/C pending additional progress and when medically cleared; home PT follow up is recommended; will follow  Barriers to Discharge Inaccessible home environment;Decreased caregiver support   Goals   Patient Goals to go home   STG Expiration Date 06/29/22   PT Treatment Day 1   Plan   Treatment/Interventions Functional transfer training;LE strengthening/ROM; Elevations; Therapeutic exercise; Endurance training;Bed mobility;Gait training;Spoke to nursing;Spoke to case management;Equipment eval/education   Progress Progressing toward goals   PT Frequency 4-6x/wk   Recommendation   PT Discharge Recommendation Home with home health rehabilitation  (home PT)   Equipment Recommended [phone] Recommended Wheeled walker   Jason Bishop 435   Turning in Bed Without Bedrails 3   Lying on Back to Sitting on Edge of Flat Bed 3   Moving Bed to Chair 3   Standing Up From Chair 3   Walk in Room 3   Climb 3-5 Stairs 3   Basic Mobility Inpatient Raw Score 18   Basic Mobility Standardized Score 41 05   Highest Level Of Mobility   JH-HLM Goal 6: Walk 10 steps or more   JH-HLM Achieved 7: Walk 25 feet or more   End of Consult   Patient Position at End of Consult Bedside chair; All needs within reach     [facility], PT

## 2022-06-16 NOTE — RESPIRATORY THERAPY NOTE
Resp Care   06/16/22 0810   Respiratory Assessment   Assessment Type Assess only   General Appearance Alert; Awake   Respiratory Pattern Normal   Chest Assessment Chest expansion symmetrical   Bilateral Breath Sounds Clear;Diminished   Resp Comments performed IS with pt  needs some encouragement, pt not reaching goal  will continue to perform with pt     Additional Assessments   SpO2 94 %

## 2022-06-16 NOTE — PROGRESS NOTES
Progress Note - Chelly Stairs  76 y o  male MRN: [id number]    Unit/Bed#: [facility] [place] 413-01 Encounter: [id number]      Assessment/Plan:  1  Deconditioning/frailty  · Cont to optimize diet, hydration, mobility for healing  · GFR 28 (stable) - keep hydrated, avoid nephrotoxins  · Hgb 8 8- monitor  Consider checking iron panel as needed  · Monitor bradycardia- assist as needed for adls  · Pt with poor appetite, not interested in eating  Recommend nutrition eval for possible protein supplements until appetite returns  · Monitor ss infection, dehydration, dvt, skin breakdown  · Encourage cough and deep breathing exercises, IS  Assist pt in splinting side w/cough/movement to decrease pain level  2   Ambulatory dysfunction  · PT OT following  For [facility] when stable  Fall precautions  3  Forgetfulness/delirium risk  · Stable  Cont frequent reminders, close monitoring for safety  Cont delirium precautions as previously written  · Regulate sleep wake cycle, encourage good nutrition, provide adequate pain control  Maintain acute and chronic conditions  4   Pain   · Stable at rest, worse with movement and cough (left truncal area)  · Cont tylenol/prn oxyIR - no recent doses oxyIR given  · Cont nonpharm methods of pain control  5  Anxiety/depression/insomnia  · Day 2 of c/o poor sleep  Mood stable  · Recommend melatonin 3mg qhs as sleep deprivation can increase delirium risk  Avoid sleep agents containing benzos and anticholinergics  · Cont emotional support, relaxation techniques, good sleep hygiene techniques  6  Constipation  · No BM, not nauseated or bloated  Cont to encourage dietary fiber, fluids, mobility as able  Cont colace/miralax  7   Severe AS  · S/p TAVR 6/14  Card sx, crit care, cardiology managing  Cont supportive cares and monitoring  Subjective:   Patient seen for geriatrics follow up  He remains in [address] [facility]  He is oob in chair  He is A&Ox4  Very tired, not sleeping well    He denies pain at rest, continues with left sided chest pain w/cough or movement  He denies cp/sob/cough  He denies gi/gu distress  Poor appetite, no BM in a few days  Objective:     Vitals: Blood pressure 132/60, pulse (!) 46, temperature (!) 97 4 °F (36 3 °C), temperature source Oral, resp  rate (!) 37, height 5' 10" (1 778 m), weight 63 5 kg (139 lb 15 9 oz), SpO2 93 %  ,Body mass index is 20 09 kg/m²  Intake/Output Summary (Last 24 hours) at 6/16/2022 0932  Last data filed at 6/16/2022 0600  Gross per 24 hour   Intake 1949 08 ml   Output 1780 ml   Net 169 08 ml       Physical Exam:   General:  Alert, oriented x 3, no distress  Cards:  RRR, no murmur/gallop/rub noted  Pulm:  Norm effort/no distress, cta, no w/r/r  Abd:  Soft, nt, nd, +bs  MS:  Norm ROM, no focal weakness  Ext:  W/d, trace p/a bilat edema  Left CT draining  Invasive Devices  Report    Central Venous Catheter Line  Duration           Introducer 06/14/22 1 day          Peripheral Intravenous Line  Duration           Peripheral IV 06/14/22 Right;Dorsal (posterior) Hand 1 day    Peripheral IV 06/14/22 Right;Lateral Arm 1 day          Arterial Line  Duration           Arterial Line 06/14/22 Left Radial 1 day          Drain  Duration           Chest Tube 1 Left Posterior;Mediastinal 28 Fr  1 day                Lab, Imaging and other studies: I have personally reviewed pertinent reports      VTE Pharmacologic Prophylaxis: Heparin  VTE Mechanical Prophylaxis: sequential compression device

## 2022-06-17 ENCOUNTER — HOME HEALTH ADMISSION (OUTPATIENT)
Dept: HOME HEALTH SERVICES | Facility: HOME HEALTHCARE | Age: 77
End: 2022-06-17
Payer: MEDICARE

## 2022-06-17 VITALS
DIASTOLIC BLOOD PRESSURE: 54 MMHG | HEIGHT: 70 IN | TEMPERATURE: 98.3 F | OXYGEN SATURATION: 96 % | RESPIRATION RATE: 16 BRPM | WEIGHT: 167.33 LBS | HEART RATE: 42 BPM | BODY MASS INDEX: 23.96 KG/M2 | SYSTOLIC BLOOD PRESSURE: 137 MMHG

## 2022-06-17 LAB
ANION GAP SERPL CALCULATED.3IONS-SCNC: 4 MMOL/L (ref 4–13)
BUN SERPL-MCNC: 52 MG/DL (ref 5–25)
CALCIUM SERPL-MCNC: 8.9 MG/DL (ref 8.3–10.1)
CHLORIDE SERPL-SCNC: 108 MMOL/L (ref 100–108)
CO2 SERPL-SCNC: 29 MMOL/L (ref 21–32)
CREAT SERPL-MCNC: 2.45 MG/DL (ref 0.6–1.3)
ERYTHROCYTE [DISTWIDTH] IN BLOOD BY AUTOMATED COUNT: 15.2 % (ref 11.6–15.1)
GFR SERPL CREATININE-BSD FRML MDRD: 24 ML/MIN/1.73SQ M
GLUCOSE SERPL-MCNC: 136 MG/DL (ref 65–140)
GLUCOSE SERPL-MCNC: 155 MG/DL (ref 65–140)
GLUCOSE SERPL-MCNC: 208 MG/DL (ref 65–140)
HCT VFR BLD AUTO: 29.5 % (ref 36.5–49.3)
HGB BLD-MCNC: 9.2 G/DL (ref 12–17)
MCH RBC QN AUTO: 27.3 PG (ref 26.8–34.3)
MCHC RBC AUTO-ENTMCNC: 31.2 G/DL (ref 31.4–37.4)
MCV RBC AUTO: 88 FL (ref 82–98)
PLATELET # BLD AUTO: 149 THOUSANDS/UL (ref 149–390)
PMV BLD AUTO: 10.8 FL (ref 8.9–12.7)
POTASSIUM SERPL-SCNC: 4.3 MMOL/L (ref 3.5–5.3)
RBC # BLD AUTO: 3.37 MILLION/UL (ref 3.88–5.62)
SODIUM SERPL-SCNC: 141 MMOL/L (ref 136–145)
WBC # BLD AUTO: 7.45 THOUSAND/UL (ref 4.31–10.16)

## 2022-06-17 PROCEDURE — 99232 SBSQ HOSP IP/OBS MODERATE 35: CPT | Performed by: INTERNAL MEDICINE

## 2022-06-17 PROCEDURE — 80048 BASIC METABOLIC PNL TOTAL CA: CPT | Performed by: PHYSICIAN ASSISTANT

## 2022-06-17 PROCEDURE — 97116 GAIT TRAINING THERAPY: CPT

## 2022-06-17 PROCEDURE — 85027 COMPLETE CBC AUTOMATED: CPT | Performed by: PHYSICIAN ASSISTANT

## 2022-06-17 PROCEDURE — 99238 HOSP IP/OBS DSCHRG MGMT 30/<: CPT | Performed by: THORACIC SURGERY (CARDIOTHORACIC VASCULAR SURGERY)

## 2022-06-17 PROCEDURE — NC001 PR NO CHARGE: Performed by: PHYSICIAN ASSISTANT

## 2022-06-17 PROCEDURE — 82948 REAGENT STRIP/BLOOD GLUCOSE: CPT

## 2022-06-17 PROCEDURE — 97530 THERAPEUTIC ACTIVITIES: CPT

## 2022-06-17 RX ORDER — ACETAMINOPHEN 325 MG/1
975 TABLET ORAL EVERY 8 HOURS
Refills: 0 | COMMUNITY
Start: 2022-06-17

## 2022-06-17 RX ORDER — DOCUSATE SODIUM 100 MG/1
100 CAPSULE, LIQUID FILLED ORAL 2 TIMES DAILY
Refills: 0 | COMMUNITY
Start: 2022-06-17

## 2022-06-17 RX ORDER — METOPROLOL SUCCINATE 25 MG/1
25 TABLET, EXTENDED RELEASE ORAL DAILY
Qty: 30 TABLET | Refills: 1 | Status: SHIPPED | OUTPATIENT
Start: 2022-06-18 | End: 2022-07-07 | Stop reason: SDUPTHER

## 2022-06-17 RX ORDER — POTASSIUM CHLORIDE 20 MEQ/1
20 TABLET, EXTENDED RELEASE ORAL DAILY
Qty: 30 TABLET | Refills: 1 | Status: SHIPPED | OUTPATIENT
Start: 2022-06-17 | End: 2022-07-07 | Stop reason: SDUPTHER

## 2022-06-17 RX ORDER — POLYETHYLENE GLYCOL 3350 17 G/17G
17 POWDER, FOR SOLUTION ORAL DAILY
Refills: 0 | COMMUNITY
Start: 2022-06-18 | End: 2022-07-20 | Stop reason: ALTCHOICE

## 2022-06-17 RX ORDER — OXYCODONE HYDROCHLORIDE 5 MG/1
TABLET ORAL
Qty: 30 TABLET | Refills: 0 | Status: SHIPPED | OUTPATIENT
Start: 2022-06-17 | End: 2022-07-20

## 2022-06-17 RX ORDER — METOPROLOL SUCCINATE 25 MG/1
25 TABLET, EXTENDED RELEASE ORAL DAILY
Status: DISCONTINUED | OUTPATIENT
Start: 2022-06-18 | End: 2022-06-17 | Stop reason: HOSPADM

## 2022-06-17 RX ORDER — LISINOPRIL 10 MG/1
10 TABLET ORAL DAILY
Qty: 30 TABLET | Refills: 1 | Status: SHIPPED | OUTPATIENT
Start: 2022-06-17 | End: 2022-07-18 | Stop reason: SDUPTHER

## 2022-06-17 RX ORDER — TORSEMIDE 20 MG/1
40 TABLET ORAL DAILY
Status: DISCONTINUED | OUTPATIENT
Start: 2022-06-18 | End: 2022-06-17 | Stop reason: HOSPADM

## 2022-06-17 RX ADMIN — ASPIRIN 81 MG CHEWABLE TABLET 81 MG: 81 TABLET CHEWABLE at 08:47

## 2022-06-17 RX ADMIN — POTASSIUM CHLORIDE 20 MEQ: 1500 TABLET, EXTENDED RELEASE ORAL at 08:47

## 2022-06-17 RX ADMIN — MUPIROCIN 1 APPLICATION: 20 OINTMENT TOPICAL at 08:47

## 2022-06-17 RX ADMIN — INSULIN LISPRO 1 UNITS: 100 INJECTION, SOLUTION INTRAVENOUS; SUBCUTANEOUS at 06:35

## 2022-06-17 RX ADMIN — DOCUSATE SODIUM 100 MG: 100 CAPSULE, LIQUID FILLED ORAL at 08:48

## 2022-06-17 RX ADMIN — PANTOPRAZOLE SODIUM 40 MG: 40 TABLET, DELAYED RELEASE ORAL at 08:47

## 2022-06-17 RX ADMIN — POLYETHYLENE GLYCOL 3350 17 G: 17 POWDER, FOR SOLUTION ORAL at 08:48

## 2022-06-17 RX ADMIN — ALLOPURINOL 100 MG: 100 TABLET ORAL at 08:47

## 2022-06-17 RX ADMIN — AMLODIPINE BESYLATE 10 MG: 10 TABLET ORAL at 08:47

## 2022-06-17 RX ADMIN — ACETAMINOPHEN 975 MG: 325 TABLET, FILM COATED ORAL at 06:34

## 2022-06-17 RX ADMIN — HEPARIN SODIUM 5000 UNITS: 5000 INJECTION INTRAVENOUS; SUBCUTANEOUS at 06:34

## 2022-06-17 RX ADMIN — CLOPIDOGREL BISULFATE 75 MG: 75 TABLET ORAL at 08:47

## 2022-06-17 RX ADMIN — INSULIN LISPRO 1 UNITS: 100 INJECTION, SOLUTION INTRAVENOUS; SUBCUTANEOUS at 11:28

## 2022-06-17 RX ADMIN — METOPROLOL TARTRATE 25 MG: 25 TABLET, FILM COATED ORAL at 10:30

## 2022-06-17 RX ADMIN — TORSEMIDE 20 MG: 20 TABLET ORAL at 08:47

## 2022-06-17 NOTE — RESPIRATORY THERAPY NOTE
Resp care   06/17/22 7694   Respiratory Assessment   Resp Comments performed IS with pt and performing well, exceeding goal  instructed on independent use  will dc ACP   Additional Assessments   Pulse 67   Respirations 17   SpO2 94 %

## 2022-06-17 NOTE — PHYSICAL THERAPY NOTE
PHYSICAL THERAPY NOTE          Patient Name: Joes Lopes  Today's Date: 6/17/2022 06/17/22 0838   PT Last Visit   PT Visit Date 06/17/22   Note Type   Note Type Treatment   Pain Assessment   Pain Assessment Tool 0-10   Pain Score No Pain   Restrictions/Precautions   Other Precautions Cardiac/sternal;Telemetry   General   Chart Reviewed Yes   Additional Pertinent History cleared for Tx session (spoke to nsg)   Response to Previous Treatment Patient with no complaints from previous session  Cognition   Overall Cognitive Status WFL   Arousal/Participation Alert; Cooperative   Attention Within functional limits   Orientation Level Oriented to person;Oriented to place; Disoriented X4   Memory Decreased recall of recent events   Following Commands Follows one step commands without difficulty   Subjective   Subjective Alert; in the chair; agreeable to amb and try steps; denies the need for an AD   Transfers   Sit to Stand 6  Modified independent   Stand to Sit 6  Modified independent   Ambulation/Elevation   Gait pattern Short stride; Inconsistent gayatri  (no overt LOB or knee buckling)   Gait Assistance 6  Modified independent   Assistive Device None   Distance 70 ft + 170 ft + 100 ft w/ steps negotiation and seated rest period in between   Stair Management Assistance 5  Supervision   Additional items Verbal cues  (reviewed sequencing)   Stair Management Technique One rail R;Step to pattern;Nonreciprocal   Number of Stairs 7   Balance   Static Sitting Good   Static Standing Fair +   Ambulatory Fair   Activity Tolerance   Activity Tolerance Patient tolerated treatment well   Nurse Made Aware spoke to Fadia Bennett, [address] [place] [address]   Assessment   Assessment Pt demonstrated overall significant improvement in balance, endurance and all aspects of observed mobility progressing to mod (I) level on level surfaces (incl amb w/o AD) and (S) on the steps; no overt uncorrected LOB, gross knee buckling, or swaying observed during the session; no increased discomfort or excessive fatigue expressed; pt appeared to be comfortable at the end of session; overall, cont to anticipate pt will return home w/ available family support upon D/C from PT/mobility stand point and when medically cleared; home PT follow up is recommended; no other immediate PT needs otherwise identified while in the hospital; pt informed and concurred; pt expressed no concerns about going home from mobility stand point, when medically cleared; will therefore sign off   Barriers to Discharge None   Goals   Patient Goals to return home   PT Treatment Day 2   Plan   Treatment/Interventions Spoke to nursing;Spoke to case management;Spoke to advanced practitioner   Progress Discontinue PT   PT Frequency Other (Comment)  (D/C PT)   Recommendation   PT Discharge Recommendation Home with home health rehabilitation  (home PT)   Equipment Recommended Other (Comment)  (none)   Walker Package Recommended   (none)   AM-PAC Basic Mobility Inpatient   Turning in Bed Without Bedrails 4   Lying on Back to Sitting on Edge of Flat Bed 4   Moving Bed to Chair 4   Standing Up From Chair 4   Walk in Room 4   Climb 3-5 Stairs 3   Basic Mobility Inpatient Raw Score 23   Basic Mobility Standardized Score 50 88   Highest Level Of Mobility   JH-HLM Goal 7: Walk 25 feet or more   JH-HLM Achieved 8: Walk 250 feet ot more   End of Consult   Patient Position at End of Consult Bedside chair; All needs within reach     [facility], PT

## 2022-06-17 NOTE — CASE MANAGEMENT
Case Management Discharge Note    Patient name Terrie Seaman  Location [facility] 407-01 MRN [id number]  : 1945 Date 2022       Current Admission Date: 2022  Current Admission Diagnosis:S/P TAVR (transcatheter aortic valve replacement)      LOS (days): 3  Geometric Mean LOS (GMLOS) (days): 3 20  Days to GMLOS:0 2     OBJECTIVE:  Risk of Unplanned Readmission Score: 19 86     Current admission status: Inpatient     Primary Insurance: MEDICARE  Secondary Insurance: COMMERCIAL MISCELLANEOUS    DISCHARGE DETAILS:    Discharge planning discussed with[de-identified] Patient  Freedom of Choice: Yes  Were Treatment Team discharge recommendations reviewed with patient/caregiver?: Yes  Did patient/caregiver verbalize understanding of patient care needs?: Yes  Were patient/caregiver advised of the risks associated with not following Treatment Team discharge recommendations?: Yes    [address] [place] [address]         Is the patient interested in Alineernesto Celi at discharge?: Yes  Via Roderick Ratliff requested[de-identified] Nursing, Physical [address] Name[de-identified] [facility] Provider[de-identified] PCP  Home Health Services Needed[de-identified] Evaluate Functional Status and Safety, Gait/ADL Training, Post-Op Care and Assessment, Strengthening/Theraputic Exercises to Improve Function, Diabetes Management, Other (comment) (Lab Draw, Care-at-Home PI program)  Homebound Criteria Met[de-identified] Requires the Assistance of Another Person for Safe Ambulation or to Leave the Home  Supporting Clincal Findings[de-identified] Limited Endurance    Other Referral/Resources/Interventions Provided:  Interventions: Pedro Alatorre    Treatment Team Recommendation: Home with  [address]  Discharge Destination Plan[de-identified] Home with [place] at Discharge : Family    Additional Comments: Pt is cleared for d/c by Cardiac Surgery LYN Rodriguez was notified of pt's d/c order   Pt is accepted for services by [facility] for his aftercare plan  The pt and his family were informed of d/c  Family will transport pt home later this day, pickup time TBD  IMM signed  No chart copy required  CM to follow

## 2022-06-17 NOTE — PLAN OF CARE
Problem: MOBILITY - ADULT  Goal: Maintain or return to baseline ADL function  Description: INTERVENTIONS:  -  Assess patient's ability to carry out ADLs; assess patient's baseline for ADL function and identify physical deficits which impact ability to perform ADLs (bathing, care of mouth/teeth, toileting, grooming, dressing, etc )  - Assess/evaluate cause of self-care deficits   - Assess range of motion  - Assess patient's mobility; develop plan if impaired  - Assess patient's need for assistive devices and provide as appropriate  - Encourage maximum independence but intervene and supervise when necessary  - Involve family in performance of ADLs  - Assess for home care needs following discharge   - Consider OT consult to assist with ADL evaluation and planning for discharge  - Provide patient education as appropriate  Outcome: Progressing     Problem: CARDIOVASCULAR - ADULT  Goal: Absence of cardiac dysrhythmias or at baseline rhythm  Description: INTERVENTIONS:  - Continuous cardiac monitoring, vital signs, obtain 12 lead EKG if ordered  - Administer antiarrhythmic and heart rate control medications as ordered  - Monitor electrolytes and administer replacement therapy as ordered  Outcome: Progressing

## 2022-06-17 NOTE — PLAN OF CARE
Problem: PHYSICAL THERAPY ADULT  Goal: Performs mobility at highest level of function for planned discharge setting  See evaluation for individualized goals  Description: Treatment/Interventions: LE strengthening/ROM, Functional transfer training, Elevations, Therapeutic exercise, Endurance training, Equipment eval/education, Bed mobility, Gait training, Spoke to nursing, Spoke to case management, OT  Equipment Recommended: [place]       See flowsheet documentation for full assessment, interventions and recommendations  Outcome: Adequate for Discharge  Note: Prognosis: Good  Problem List: Decreased strength, Decreased endurance, Impaired balance, Decreased mobility  Assessment: Pt demonstrated overall significant improvement in balance, endurance and all aspects of observed mobility progressing to mod (I) level on level surfaces (incl amb w/o AD) and (S) on the steps; no overt uncorrected LOB, gross knee buckling, or swaying observed during the session; no increased discomfort or excessive fatigue expressed; pt appeared to be comfortable at the end of session; overall, cont to anticipate pt will return home w/ available family support upon D/C from PT/mobility stand point and when medically cleared; home PT follow up is recommended; no other immediate PT needs otherwise identified while in the hospital; pt informed and concurred; pt expressed no concerns about going home from mobility stand point, when medically cleared; will therefore sign off  Barriers to Discharge: None        PT Discharge Recommendation: Home with home health rehabilitation (home PT)          See flowsheet documentation for full assessment

## 2022-06-17 NOTE — DISCHARGE SUMMARY
Discharge Summary - Cardiothoracic Surgery   Renny Henson  76 y o  male MRN: [id number]  Unit/Bed#: [facility] 407-01 Encounter: [id number]    Admission Date: 6/14/2022     Discharge Date: 06/17/22    Admitting Diagnosis: Aortic stenosis, severe [I35 0]  Aorto-iliac disease ([address] ) [I77 9]    Primary Discharge Diagnosis:   Aortic stenosis, Non-Rheumatic  S/P transacpical transcatheter aortic valve replacement;    Secondary Discharge Diagnosis:   CAD (PCI/BURAK/CABG), CHF, CKD IV (Cr 2 39), HTN, Hyperlipidemia, SB 1° AVB, Anemia, GERD, colitis, PAD, Renal artery stenosis, kidney stones, shingles, spinal stenosis, Gout, pancreatitis, s/p LCF thromboendarterectomy, Appe, T & A      Attending: OSWALD Lyn  Consulting Physician(s):   Cardiology  Medical/Critical Care    Procedures Performed:   Procedure(s):  REPLACEMENT AORTIC VALVE TRANSCATHETER (TAVR) TRANSAPICAL 29MM IRVIN HOWIE S3 ULTRA VALVE(ACCESS ON LEFT) ELANA  CARDIAC TAVR     Hospital Course:   6/14: Elective admission for TA TAVR # 29mm Howie S3 Ultra  Extubated and transferred to ICU supported with Cardene 8mg  ECG shows NSR with 1st degree block and new LBBB  [facility], Gerontology and cardiology consulted  PM: Weaned off cardene, continues NSR w/ ST depressions on telem & wide complex    6/15: On 3LNC, wean as tolerated  On cardene 8, resume home Norvasc 10mg, start Lopressor 12 5mg, wean cardene as able then discontinue a line  SB imn 50s w/ 1st AVB  Cr 2 11 from 2 48, resume home Torsemide 20mg BID, discontinue rubi catheter  F/u cardiology & geriatrics consult  F/u TTE  Transfer to stepdown  Remains on Cardene 7mg  Increase Metoprolol 25mg q12    6/16: Remains on Cardene 7mg  Echo yesterday satisfactory  NSR/SB  Labs stable  On 4L NC  +290ml/24 hrs  Start Lisinopril 10mg daily, wean Cardene off  D/C CT  [facility] to [facility] when bed [facility]    6/17: Bigeminy with PVCs controlled with BB  Cr 2 45 (baseline of 2 1 - 2 6) recheck BMP on Monday  Continue lisinopril with norvasc and metoprolol   Continue torsemide daily   Stable for d/c home with [facility]        Condition at Discharge:   good     Discharge Physical Exam:    Please see the documented physical exam from this morning's progress note for details  Discharge Data:  Results from last 7 days   Lab Units 06/17/22 0427 06/16/22  0328 06/15/22  0352   WBC Thousand/uL 7 45 8 51 10 24*   HEMOGLOBIN g/dL 9 2* 8 8* 9 4*   HEMATOCRIT % 29 5* 27 6* 28 8*   PLATELETS Thousands/uL 149 143* 203     Results from last 7 days   Lab Units 06/17/22  0427 06/16/22  0328 06/15/22  0352 06/14/22  1840 06/14/22  1454   POTASSIUM mmol/L 4 3 3 7 4 4   < >  --    CHLORIDE mmol/L 108 108 106  --   --    CO2 mmol/L 29 30 29  --   --    CO2, I-STAT mmol/L  --   --   --   --  32   BUN mg/dL 52* 46* 48*  --   --    CREATININE mg/dL 2 45* 2 16* 2 11*  --   --    GLUCOSE, ISTAT mg/dl  --   --   --   --  143*   CALCIUM mg/dL 8 9 8 5 8 6  --   --     < > = values in this interval not displayed  Results from last 7 days   Lab Units 06/14/22  1446   INR  1 10   PTT seconds 33       Discharge instructions/Information to patient and family:   See after visit summary for information provided to patient and family  Guy Guy  was educated on restrictions regarding driving and lifting, and techniques of proper incisional care  They were specifically counselled on signs and symptoms of an incisional infection, and advised to contact our service immediately should they develop fevers, sweats, chill, redness or drainage at the site of any incisions  Provisions for Follow-Up Care:  See after visit summary for information related to follow-up care and any pertinent home health orders  Disposition:  Home    Planned Readmission:   No    Discharge Medications:  See after visit summary for reconciled discharge medications provided to patient and family        Guy Guy  was provided contact information and scheduled a follow up appointment with OSWALD García  Additionally, follow up appointments have been scheduled for their primary care physician and primary cardiologist   Contact information was provided  Sedona Peer  was counseled on the importance of avoiding tobacco products  As with all patients whom have undergone open heart surgery, tobacco cessation medication was contraindicated at the time of discharge  ACE/ARB was Prescribed at discharge    Beta Blocker was Prescribed at discharge    Aspirin was Prescribed at discharge    Statin was Prescribed at discharge     BMP on Monday, [facility] order placed     The patient was discharged on ongoing diuretic therapy with Torsemide 20 mg, PO QD and Potassium Chloride 10 mEq, PO QD  They were advised to continue these medications unless otherwise directed  Narcotic pain medication was prescribed in the form of oxycodone  Prior to prescribing, their prescription profile was reviewed on the [facility] prescription drug monitoring program     The patient was informed that following their postoperative surgical evaluation, they will be referred to outpatient cardiac rehabilitation  They were counseled that this program is run by specialists who will help them safely strengthen their heart and prevent more heart disease  Cardiac rehabilitation will include exercise, relaxation, stress management, and heart-healthy nutrition  Caregivers will also check to make sure their medication regimen is working  During this admission, the patient was questioned on their use of tobacco, alcohol, and illicit/non-prescription drug use in the  previous 24 months  During this time frame they admit to using tobacco  As such they have been counseled on the importance of cessation and abstinence  I spent 30 minutes discharging the patient  This time was spent on the day of discharge   I had direct contact with the patient on the day of discharge  Additional documentation is required if more than 30 minutes were spent on discharge       SIGNATURE: Erica Ramirez  DATE: June 17, 2022  TIME: 12:15 PM

## 2022-06-17 NOTE — PROGRESS NOTES
[facility]    Cardiology Progress Note  Grady Mccurdy  76 y o  male   YOB: 1945 MRN: [id number]  Unit/Bed#: [facility] 407-01 Encounter: [id number]      Subjective:   No significant events overnight  He continues to do well and has not had any major symptoms  No complains of chest pain, shortness of breath, palpitations or dizziness  Assessments  43-year-old gentleman with a history of coronary artery disease status post remote CABG 2 in 2013, hypertension, hyperlipidemia, had been having issues with shortness of breath and was found to have severe aortic stenosis outpatient  He was referred for TAVR, and during preop left heart catheterization, he was found to have severe stenosis of RCA, which was successfully stented  He underwent elective in TAVR yesterday, and is currently admitted for postoperative management  We are consulted for postoperative cardiac management  Principal Problem:    S/P TAVR (transcatheter aortic valve replacement)  Active Problems:    PVD (peripheral vascular disease) (Former[facility])    Hypertension    Aortic stenosis, severe    Type 2 diabetes mellitus, without long-term current use of insulin ([facility])    GERD (gastroesophageal reflux disease)    Hyperlipidemia    Stage 4 chronic kidney disease ([facility] [address] )        Plan  S/p #29 TAVR   · Doing well postoperatively no acute complaints   · Postop 0 day echo - LVEF normal, normally functioning bioprosthetic AVR  No significant regurgitation  Trace to mild MR  Dilated IVC     · Continue aspirin, Plavix, statin   · Home diuretic: Torsemide 20 mg b i d    · He reports excessive diuresis during the night making things difficult for him   · Will transition to torsemide 40 mg daily from tomorrow  · Can take extra 20 mg in evening PRN if weight gain > 3lbs      Hypertension   · Bp well controlled, off nicardipine  · Continue amlodipine 10, metoprolol XL 25, torsemide 40    PVCs, Sinus bradycardia  · Had PVCs yesterday and overnight, currently NSR/sinus bradycardia with HR 50-70  · On metoprolol 25 mg bid --> will change to metoprolol succinate 25 mg daily    Stable for discharge from the cardiac standpoint with outpatient Cardiology follow-up  Discussed with primary surgical service    Review of Systems   All other systems reviewed and are negative  Telemetry Review: No significant arrhythmias seen on telemetry review  PVCs, sinus bradycardia with HR in 50s  Currently NSR in 60s    Objective:   Vitals: Blood pressure 133/56, pulse 67, temperature 98 5 °F (36 9 °C), temperature source Oral, resp  rate 17, height 5' 10" (1 778 m), weight 75 9 kg (167 lb 5 3 oz), SpO2 94 %  , Body mass index is 24 01 kg/m² ,   Orthostatic Blood Pressures    Flowsheet Row Most Recent Value   Blood Pressure 133/56 filed at 06/17/2022 0739   Patient Position - Orthostatic VS Sitting filed at 06/17/2022 5657         Systolic (41PMC), BOQ:000 , Min:109 , RFI:825     Diastolic (28RNH), DHJ:04, Min:40, Max:84    Wt Readings from Last 5 Encounters:   06/17/22 75 9 kg (167 lb 5 3 oz)   06/01/22 73 9 kg (163 lb)   05/24/22 72 6 kg (160 lb)   05/11/22 74 3 kg (163 lb 14 4 oz)   05/08/22 73 kg (161 lb)     I/O       06/15 0701  06/16 0700 06/16 0701  06/17 0700 06/17 0701  06/18 0700    P  O  840      I V  (mL/kg) 1326 3 (20 9) 108 8 (1 4)     IV Piggyback 50      Total Intake(mL/kg) 2216 3 (34 9) 108 8 (1 4)     Urine (mL/kg/hr) 1805 (1 2) 1225 (0 7)     Chest Tube 120 0     Total Output 1925 1225     Net +291 3 -1116 2            Unmeasured Urine Occurrence  2 x               Physical Exam  Vitals and nursing note reviewed  Constitutional:       General: He is not in acute distress  Appearance: He is well-developed  He is not ill-appearing or diaphoretic  HENT:      Head: Normocephalic and atraumatic  Nose: No congestion  Eyes:      General: No scleral icterus       Conjunctiva/sclera: Conjunctivae normal    Neck:      Vascular: No carotid bruit or JVD  Cardiovascular:      Rate and Rhythm: Normal rate and regular rhythm  Heart sounds: Normal heart sounds  No murmur heard  No friction rub  No gallop  Pulmonary:      Effort: Pulmonary effort is normal  No respiratory distress  Breath sounds: Normal breath sounds  No wheezing or rales  Chest:      Chest wall: No tenderness  Abdominal:      General: There is no distension  Palpations: Abdomen is soft  Tenderness: There is no abdominal tenderness  Musculoskeletal:         General: No swelling, tenderness or deformity  Cervical back: Neck supple  No muscular tenderness  Right lower leg: No edema  Left lower leg: No edema  Skin:     General: Skin is warm  Neurological:      General: No focal deficit present  Mental Status: He is alert and oriented to person, place, and time  Mental status is at baseline  Psychiatric:         Mood and Affect: Mood normal          Behavior: Behavior normal          Thought Content:  Thought content normal        Laboratory Results: personally reviewed        CBC with diff:   Results from last 7 days   Lab Units 06/17/22  0427 06/16/22  0328 06/15/22  0352 06/14/22  1454 06/14/22  1446 06/14/22  1412 06/14/22  1351   WBC Thousand/uL 7 45 8 51 10 24*  --   --   --   --    HEMOGLOBIN g/dL 9 2* 8 8* 9 4*  --  10 6*  --   --    I STAT HEMOGLOBIN g/dl  --   --   --  10 9*  --  9 5* 9 2*   HEMATOCRIT % 29 5* 27 6* 28 8*  --  33 5*  --   --    HEMATOCRIT, ISTAT %  --   --   --  32*  --  28* 27*   MCV fL 88 86 85  --   --   --   --    PLATELETS Thousands/uL 149 143* 203  --  189  --   --    MCH pg 27 3 27 3 27 7  --   --   --   --    MCHC g/dL 31 2* 31 9 32 6  --   --   --   --    RDW % 15 2* 15 2* 15 3*  --   --   --   --    MPV fL 10 8 10 2 10 5  --  9 9  --   --          CMP:  Results from last 7 days   Lab Units 06/17/22  0427 06/16/22  0328 06/15/22  0352 06/14/22  2205 06/14/22  1840 06/14/22  1454 06/14/22  1446 06/14/22  1412 06/14/22  1351 06/14/22  1301   POTASSIUM mmol/L 4 3 3 7 4 4 4 7 3 8  --  3 9  --   --   --    CHLORIDE mmol/L 108 108 106  --   --   --  108  --   --   --    CO2 mmol/L 29 30 29  --   --   --  30  --   --   --    CO2, I-STAT mmol/L  --   --   --   --   --  32  --  30 31 31   BUN mg/dL 52* 46* 48*  --   --   --  42*  --   --   --    CREATININE mg/dL 2 45* 2 16* 2 11*  --   --   --  2 49*  --   --   --    GLUCOSE, ISTAT mg/dl  --   --   --   --   --  143*  --  126 128 127   CALCIUM mg/dL 8 9 8 5 8 6  --   --   --  8 6  --   --   --    EGFR ml/min/1 73sq m 24 28 28  --   --   --  23  --   --   --          BMP:  Results from last 7 days   Lab Units 06/17/22  0427 06/16/22  0328 06/15/22  0352 06/14/22  2205 06/14/22  1840 06/14/22  1454 06/14/22  1446 06/14/22  1412 06/14/22  1351   POTASSIUM mmol/L 4 3 3 7 4 4 4 7 3 8  --  3 9  --   --    CHLORIDE mmol/L 108 108 106  --   --   --  108  --   --    CO2 mmol/L 29 30 29  --   --   --  30  --   --    CO2, I-STAT mmol/L  --   --   --   --   --  32  --  30 31   BUN mg/dL 52* 46* 48*  --   --   --  42*  --   --    CREATININE mg/dL 2 45* 2 16* 2 11*  --   --   --  2 49*  --   --    GLUCOSE, ISTAT mg/dl  --   --   --   --   --  143*  --  126 128   CALCIUM mg/dL 8 9 8 5 8 6  --   --   --  8 6  --   --        BNP: No results for input(s): BNP in the last 72 hours      Magnesium:   Results from last 7 days   Lab Units 06/15/22  0352   MAGNESIUM mg/dL 1 9       Coags:   Results from last 7 days   Lab Units 06/14/22  1446   PTT seconds 33   INR  1 10       TSH:        Hemoglobin A1C       Lipid Profile:       Meds/Allergies   all current active meds have been reviewed and current meds:   Current Facility-Administered Medications   Medication Dose Route Frequency    acetaminophen (TYLENOL) tablet 975 mg  975 mg Oral Q8H    allopurinol (ZYLOPRIM) tablet 100 mg  100 mg Oral Daily    amLODIPine (NORVASC) tablet 10 mg  10 mg Oral Daily    aspirin chewable tablet 81 mg 81 mg Oral Daily    atorvastatin (LIPITOR) tablet 80 mg  80 mg Oral HS    bisacodyl (DULCOLAX) rectal suppository 10 mg  10 mg Rectal Daily PRN    clopidogrel (PLAVIX) tablet 75 mg  75 mg Oral Daily    docusate sodium (COLACE) capsule 100 mg  100 mg Oral BID    heparin (porcine) subcutaneous injection 5,000 Units  5,000 Units Subcutaneous Q8H [facility]    insulin lispro (HumaLOG) 100 units/mL subcutaneous injection 1-5 Units  1-5 Units Subcutaneous TID AC    insulin lispro (HumaLOG) 100 units/mL subcutaneous injection 1-5 Units  1-5 Units Subcutaneous HS    [START ON 6/18/2022] metoprolol succinate (TOPROL-XL) 24 hr tablet 25 mg  25 mg Oral Daily    mupirocin (BACTROBAN) 2 % nasal ointment 1 application  1 application Nasal R43H [facility]    ondansetron (ZOFRAN) injection 4 mg  4 mg Intravenous Q6H PRN    oxyCODONE (ROXICODONE) IR tablet 2 5 mg  2 5 mg Oral Q4H PRN    oxyCODONE (ROXICODONE) IR tablet 5 mg  5 mg Oral Q6H PRN    pantoprazole (PROTONIX) EC tablet 40 mg  40 mg Oral Daily    polyethylene glycol (MIRALAX) packet 17 g  17 g Oral Daily    potassium chloride (K-DUR,KLOR-CON) CR tablet 20 mEq  20 mEq Oral BID    torsemide (DEMADEX) tablet 20 mg  20 mg Oral BID     Medications Prior to Admission   Medication    allopurinol (ZYLOPRIM) 100 mg tablet    amLODIPine (NORVASC) 10 mg tablet    AMYLASE-LIPASE-PROTEASE PO    aspirin (ECOTRIN LOW STRENGTH) 81 mg EC tablet    atorvastatin (LIPITOR) 80 mg tablet    clopidogrel (PLAVIX) 75 mg tablet    Cyanocobalamin (VITAMIN B12 PO)    Magnesium Oxide (MAG-200 PO)    Multiple Vitamin (MULTIVITAMIN) tablet    mupirocin (BACTROBAN) 2 % nasal ointment    pantoprazole (PROTONIX) 40 mg tablet    torsemide (DEMADEX) 20 mg tablet            Cardiac testing: reviewed  Results for orders placed during the hospital encounter of 05/06/21    Echo complete with contrast if indicated    Narrative  [facility] [address] [place] [id number]  [phone]    Transthoracic Echocardiogram  2D, M-mode, Doppler, and Color Doppler    Study date:  06-May-2021    Patient: Bre Leon  MR number: [de-identified]  Account number: [de-identified]  : 1945  Age: 64 years  Gender: Male  Status: Outpatient  Location: [place] [address]   Height: 70 in  Weight: 181 lb  BP: 150/ 58 mmHg    Indications: Assess the aortic valve  Diagnoses: I35 0 - Nonrheumatic aortic (valve) stenosis    Sonographer:  Sara Higuera RDCS  Primary Physician:  Krzysztof Keys MD  Referring Physician:  Nela Lou MD  Group:  [facility]  Interpreting Physician:  Kelly Rivera MD    SUMMARY    LEFT VENTRICLE:  Systolic function was normal  Ejection fraction was estimated to be 60 %  There were no regional wall motion abnormalities  Wall thickness was mildly increased  Doppler parameters were consistent with abnormal left ventricular relaxation (grade 1 diastolic dysfunction)  LEFT ATRIUM:  The atrium was mildly dilated  MITRAL VALVE:  There was mild regurgitation  AORTIC VALVE:  The valve was probably trileaflet  Leaflets exhibited moderately increased thickness and moderate calcification  There was severe stenosis  There was trace regurgitation  Valve peak gradient was 63 mmHg  Valve mean gradient was 32 mmHg  Aortic valve area was 1 cmï¾² by the continuity equation  Estimated aortic valve area (by VTI) was 0 91 cmï¾²  TRICUSPID VALVE:  There was trace regurgitation  HISTORY: PRIOR HISTORY: CAD, bradycardia, DM, dyslipidemia, PAD, CABG,    PROCEDURE: The study was performed in the [address]  This was a routine study  The transthoracic approach was used  The study included complete 2D imaging, M-mode, complete spectral Doppler, and color Doppler  The heart rate was  66 bpm, at the start of the study  Images were obtained from the parasternal, apical, subcostal, and suprasternal notch acoustic windows   Image quality was adequate  LEFT VENTRICLE: Size was normal  Systolic function was normal  Ejection fraction was estimated to be 60 %  There were no regional wall motion abnormalities  Wall thickness was mildly increased  DOPPLER: Doppler parameters were consistent  with abnormal left ventricular relaxation (grade 1 diastolic dysfunction)  RIGHT VENTRICLE: The size was normal  Systolic function was normal  Wall thickness was normal     LEFT ATRIUM: The atrium was mildly dilated  RIGHT ATRIUM: Size was normal     MITRAL VALVE: Valve structure was normal  There was normal leaflet separation  DOPPLER: The transmitral velocity was within the normal range  There was no evidence for stenosis  There was mild regurgitation  AORTIC VALVE: The valve was probably trileaflet  Leaflets exhibited moderately increased thickness and moderate calcification  DOPPLER: There was severe stenosis  There was trace regurgitation  TRICUSPID VALVE: The valve structure was normal  There was normal leaflet separation  DOPPLER: The transtricuspid velocity was within the normal range  There was no evidence for stenosis  There was trace regurgitation  PULMONIC VALVE: Leaflets exhibited normal thickness, no calcification, and normal cuspal separation  DOPPLER: The transpulmonic velocity was within the normal range  There was no significant regurgitation  PERICARDIUM: There was no pericardial effusion  The pericardium was normal in appearance  AORTA: The root exhibited normal size  SYSTEMIC VEINS: IVC: The inferior vena cava was normal in size      MEASUREMENT TABLES    2D MEASUREMENTS  LVOT   (Reference normals)  Diam   23 mm   (--)    DOPPLER MEASUREMENTS  LVOT   (Reference normals)  VTI   24 cm   (--)  R-R interval   1017 ms   (--)  HR   59 bpm   (--)  Stroke vol   99 71 ml   (--)  Cardiac output   5 88 L/min   (--)  Cardiac index   2 94 L/min/mï¾²   (--)  Aortic valve   (Reference normals)  VTI   108 cm   (--)  Peak gradient   63 mmHg   (--)  Mean gradient   32 mmHg   (--)  Valve area, cont   1 cmï¾²   (--)  Obstr index, VTI   0 22    (--)  Valve area, VTI   0 91 cmï¾²   (--)  Area index, VTI   0 46 cmï¾²/mï¾²   (--)    SYSTEM MEASUREMENT TABLES    2D  %FS: 23 52 %  Ao Diam: 3 06 cm  EDV(Teich): 137 67 ml  EF(Teich): 46 6 %  ESV(Teich): 73 51 ml  IVSd: 1 26 cm  LA Area: 24 18 cm2  LA Diam: 4 19 cm  LVEDV MOD A4C: 198 32 ml  LVEF MOD A4C: 54 08 %  LVESV MOD A4C: 91 07 ml  LVIDd: 5 34 cm  LVIDs: 4 08 cm  LVLd A4C: 10 15 cm  LVLs A4C: 8 98 cm  LVOT Diam: 2 43 cm  LVPWd: 1 18 cm  RA Area: 14 06 cm2  RVIDd: 3 75 cm  SV MOD A4C: 107 24 ml  SV(Teich): 64 16 ml    CW  AV Env  Ti: 414 84 ms  AV MaxP 54 mmHg  AV VTI: 103 91 cm  AV Vmax: 3 82 m/s  AV Vmean: 2 5 m/s  AV meanP 08 mmHg    MM  TAPSE: 2 13 cm    PW  LUIS (VTI): 1 06 cm2  LUIS Vmax: 1 04 cm2  E' Sept: 0 04 m/s  E/E' Sept: 22 83  LVOT Env  Ti: 482 08 ms  LVOT VTI: 23 7 cm  LVOT Vmax: 0 86 m/s  LVOT Vmean: 0 5 m/s  LVOT maxP 94 mmHg  LVOT meanP 25 mmHg  LVSV Dopp: 110 25 ml  MV A Pop: 1 23 m/s  MV Dec [place]: 5 05 m/s2  MV DecT: 180 67 ms  MV E Pop: 0 91 m/s  MV E/A Ratio: 0 74  MV PHT: 52 39 ms  MVA By PHT: 4 2 cm2    [employer] Accredited Echocardiography Laboratory    Prepared and electronically signed by    Janna Aguilar MD  Signed 06-May-2021 16:40:31    No results found for this or any previous visit  No results found for this or any previous visit  No results found for this or any previous visit

## 2022-06-17 NOTE — PROGRESS NOTES
Progress Note - Cardiothoracic Surgery   Renny Henson  76 y o  male MRN: [id number]  Unit/Bed#: [facility] 407-01 Encounter: [id number]    Aortic stenosis, Non-Rheumatic   S/P transacpical transcatheter aortic valve replacement; POD # 3    24 Hour Events: No events overnight, No complaints this morning  Ambulating well  Denies any pain  Tolerating diet    Medications:   Scheduled Meds:  Current Facility-Administered Medications   Medication Dose Route Frequency Provider Last Rate    acetaminophen  975 mg Oral Q8H Yaneth E Sterner, PA-C      allopurinol  100 mg Oral Daily Yaneth E Sterner, PA-C      amLODIPine  10 mg Oral Daily Yaneth E Sterner, PA-C      aspirin  81 mg Oral Daily Yaneth E Sterner, PA-C      atorvastatin  80 mg Oral HS Yaneth E Sterner, PA-C      bisacodyl  10 mg Rectal Daily PRN Julia Reyes, PA-C      clopidogrel  75 mg Oral Daily Yaneth E Sterner, PA-C      docusate sodium  100 mg Oral BID Julia Reyes, PA-C      heparin (porcine)  5,000 Units Subcutaneous Q8H [facility] Yaneth E Sterner, PA-C      HYDROmorphone  0 5 mg Intravenous Q2H PRN Julia Reyes, PA-C      insulin lispro  1-5 Units Subcutaneous TID AC Yaneth E Sterner, PA-C      insulin lispro  1-5 Units Subcutaneous HS Yaneth E Sterner, PA-C      lisinopril  10 mg Oral Daily Eduardiridion Geneva, PA-C      metoprolol tartrate  25 mg Oral Q12H [facility] Yaneth E Sterner, PA-C      mupirocin  1 application Nasal P77L [facility] Yaneth E Sterner, PA-C      niCARdipine  2 5-15 mg/hr Intravenous Titrated Espiridion Ramary, LYN Stopped (06/16/22 1200)    ondansetron  4 mg Intravenous Q6H PRN Julia Reyes, PA-C      oxyCODONE  2 5 mg Oral Q4H PRN Julia Amy, PA-C      oxyCODONE  5 mg Oral Q6H PRN Julia Reyes, PA-C      pantoprazole  40 mg Oral Daily Yaneth E Sterner, PA-C      polyethylene glycol  17 g Oral Daily Yaneth Wilson PA-C      potassium chloride  20 mEq Oral BID Espiridion LYN Bean      torsemide  20 mg Oral BID Yaneth Wilson PA-C       Continuous Infusions:niCARdipine, 2 5-15 mg/hr, Last Rate: Stopped (06/16/22 1200)      PRN Meds:   bisacodyl    HYDROmorphone    ondansetron    oxyCODONE    oxyCODONE    Vitals:   Vitals:    06/16/22 2300 06/17/22 0300 06/17/22 0540 06/17/22 0739   BP: 146/59 (!) 109/40  133/56   BP Location:  Right arm  Right arm   Pulse: 60 65  67   Resp: 20 18  17   Temp: 98 °F (36 7 °C) 98 3 °F (36 8 °C)  98 5 °F (36 9 °C)   TempSrc: Oral Oral  Oral   SpO2: 95% 92%  94%   Weight:   75 9 kg (167 lb 5 3 oz)    Height:           Telemetry: NSR; Heart Rate: 56    Respiratory:   SpO2: SpO2: 94 %; Room Air    Intake/Output:     Intake/Output Summary (Last 24 hours) at 6/17/2022 0810  Last data filed at 6/17/2022 0014  Gross per 24 hour   Intake 38 79 ml   Output 1225 ml   Net -1186 21 ml      Weights:   Weight (last 2 days)     Date/Time Weight    06/17/22 0540 75 9 (167 33)    06/16/22 0546 63 5 (139 99)    06/15/22 1143 74 4 (164)    06/15/22 0600 74 7 (164 68)            Results:   Results from last 7 days   Lab Units 06/17/22  0427 06/16/22  0328 06/15/22  0352   WBC Thousand/uL 7 45 8 51 10 24*   HEMOGLOBIN g/dL 9 2* 8 8* 9 4*   HEMATOCRIT % 29 5* 27 6* 28 8*   PLATELETS Thousands/uL 149 143* 203     Results from last 7 days   Lab Units 06/17/22  0427 06/16/22  0328 06/15/22  0352 06/14/22  1840 06/14/22  1454   SODIUM mmol/L 141 141 140  --   --    POTASSIUM mmol/L 4 3 3 7 4 4   < >  --    CHLORIDE mmol/L 108 108 106  --   --    CO2 mmol/L 29 30 29  --   --    CO2, I-STAT mmol/L  --   --   --   --  32   BUN mg/dL 52* 46* 48*  --   --    CREATININE mg/dL 2 45* 2 16* 2 11*  --   --    GLUCOSE, ISTAT mg/dl  --   --   --   --  143*   CALCIUM mg/dL 8 9 8 5 8 6  --   --     < > = values in this interval not displayed       Results from last 7 days   Lab Units 06/14/22  1446   INR  1 10   PTT seconds 33       Invasive Lines/Tubes:  Invasive Devices  Report    Central Venous Catheter Line Duration           Introducer 06/14/22 2 days          Peripheral Intravenous Line  Duration           Peripheral IV 06/14/22 Right;Dorsal (posterior) Hand 2 days    Peripheral IV 06/14/22 Right;Lateral Arm 2 days                Physical Exam:    HEENT/NECK:  Normocephalic  Atraumatic  No jugular venous distention  Cardiac: Regular rate and rhythm  Pulmonary:  Breath sounds clear bilaterally  Abdomen:  Non-tender, Non-distended and Normal bowel sounds  Incisions: Thoracotomy incision  is clean, dry, and intact  Extremities: Extremities warm/dry and No edema B/L  Neuro: Alert and oriented X 3  Skin: Warm/Dry, without rashes or lesions  Assessment:  Principal Problem:    S/P TAVR (transcatheter aortic valve replacement)  Active Problems:    PVD (peripheral vascular disease) ([facility])    Hypertension    Aortic stenosis, severe    Type 2 diabetes mellitus, without long-term current use of insulin ([facility])    GERD (gastroesophageal reflux disease)    Hyperlipidemia    Stage 4 chronic kidney disease ([facility])       Aortic stenosis, Non-Rheumatic  S/P transacpical transcatheter aortic valve replacement; POD # 3    Plan:    1  Cardiac:   NSR; HR/BP well-controlled  On metoprolol 25 mg bid, amlodipine 10 mg daily  Stopped lisinopril with Cr   ASA and Plavix for TAVR   Continue  Statin therapy  Central IV access no longer required; Remove central venous catheter today  Continue DVT prophylaxis    2  Pulmonary:   Good Room air oxygen saturation; Continue incentive spirometry/Coughing/Deep breathing exercises  Chest tubes have been discontinued    3  Renal: baseline Cr around 2  0 to 2 3  Intake/Output net: -1186 mL/24 hours  Diuretic Regimen:low dose diuretic at discharge   Post op Creatinine stable; Follow up labs prn    4  Neuro:  Neurologically intact; No active issues  Incisional pain well-controlled  Continue Tylenol, 975 mg PO q 8, standing dose  Continue Oxycodone, 2 5 to 5 mg PO q 4 hours prn pain    5   GI:  Tolerating TLC 2 3 gm sodium diet  Maintain 1800 mL daily fluid restriction   Continue stool softeners and prn suppository  Continue GI prophylaxis    6  Endo:   Glucose well-controlled with sliding scale coverage    7    Hematology:    Post-operative blood count acceptable; Trend prn    8     Disposition:      Anticipate discharge to home today (lives with niece and brother is close by)     VTE Pharmacologic Prophylaxis: Heparin  VTE Mechanical Prophylaxis: sequential compression device    Collaborative rounds completed with supervising physician  Plan of care discussed with bedside nurse    SIGNATURE: Michelle Zapata PA-C  DATE: June 17, 2022  TIME: 8:10 AM

## 2022-06-19 ENCOUNTER — HOME CARE VISIT (OUTPATIENT)
Dept: HOME HEALTH SERVICES | Facility: HOME HEALTHCARE | Age: 77
End: 2022-06-19
Payer: MEDICARE

## 2022-06-19 VITALS
RESPIRATION RATE: 16 BRPM | HEART RATE: 55 BPM | SYSTOLIC BLOOD PRESSURE: 124 MMHG | TEMPERATURE: 97.5 F | DIASTOLIC BLOOD PRESSURE: 50 MMHG | OXYGEN SATURATION: 97 %

## 2022-06-19 PROCEDURE — 10330081 VN NO-PAY CLAIM PROCEDURE

## 2022-06-19 PROCEDURE — G0299 HHS/HOSPICE OF RN EA 15 MIN: HCPCS

## 2022-06-19 PROCEDURE — 400013 VN SOC

## 2022-06-19 NOTE — CASE COMMUNICATION
[facility] has admitted your patient to [address] [place] [address] service with the following disciplines:      SN and PT  This report is informational only, no responses is needed  Primary focus of home health care cardiac assessment,   Patient stated goals of care to get back outside to his garden  Anticipated visit pattern and next visit date 6/22/2022  See medication list - meds in home differ from AVS, patient does not have Docusate, Oxycodon e, Tylenol  Potential barriers to goal achievement patient may be non-compliant due to the restrictions on him going outside to his garden and picking up his cat      Thank you for allowing us to participate in the care of your patient

## 2022-06-20 ENCOUNTER — APPOINTMENT (OUTPATIENT)
Dept: LAB | Facility: MEDICAL CENTER | Age: 77
End: 2022-06-20
Payer: MEDICARE

## 2022-06-20 ENCOUNTER — PATIENT OUTREACH (OUTPATIENT)
Dept: FAMILY MEDICINE CLINIC | Facility: CLINIC | Age: 77
End: 2022-06-20

## 2022-06-20 DIAGNOSIS — N18.30 BENIGN HYPERTENSION WITH CHRONIC KIDNEY DISEASE, STAGE III (HCC): ICD-10-CM

## 2022-06-20 DIAGNOSIS — R80.1 PERSISTENT PROTEINURIA, UNSPECIFIED: ICD-10-CM

## 2022-06-20 DIAGNOSIS — N18.32 STAGE 3B CHRONIC KIDNEY DISEASE (HCC): ICD-10-CM

## 2022-06-20 DIAGNOSIS — E83.42 HYPOMAGNESEMIA: ICD-10-CM

## 2022-06-20 DIAGNOSIS — N18.4 ANEMIA DUE TO STAGE 4 CHRONIC KIDNEY DISEASE (HCC): ICD-10-CM

## 2022-06-20 DIAGNOSIS — Z95.2 S/P TAVR (TRANSCATHETER AORTIC VALVE REPLACEMENT): ICD-10-CM

## 2022-06-20 DIAGNOSIS — I12.9 BENIGN HYPERTENSION WITH CHRONIC KIDNEY DISEASE, STAGE III (HCC): ICD-10-CM

## 2022-06-20 DIAGNOSIS — D63.1 ANEMIA DUE TO STAGE 4 CHRONIC KIDNEY DISEASE (HCC): ICD-10-CM

## 2022-06-20 DIAGNOSIS — I35.0 AORTIC STENOSIS, SEVERE: ICD-10-CM

## 2022-06-20 DIAGNOSIS — N18.4 STAGE 4 CHRONIC KIDNEY DISEASE (HCC): ICD-10-CM

## 2022-06-20 DIAGNOSIS — E79.0 HYPERURICEMIA: ICD-10-CM

## 2022-06-20 LAB
ANION GAP SERPL CALCULATED.3IONS-SCNC: 7 MMOL/L (ref 4–13)
BACTERIA UR QL AUTO: ABNORMAL /HPF
BASOPHILS # BLD AUTO: 0.02 THOUSANDS/ΜL (ref 0–0.1)
BASOPHILS NFR BLD AUTO: 0 % (ref 0–1)
BILIRUB UR QL STRIP: NEGATIVE
BUN SERPL-MCNC: 53 MG/DL (ref 5–25)
CALCIUM SERPL-MCNC: 9.3 MG/DL (ref 8.3–10.1)
CHLORIDE SERPL-SCNC: 106 MMOL/L (ref 100–108)
CLARITY UR: CLEAR
CO2 SERPL-SCNC: 27 MMOL/L (ref 21–32)
COLOR UR: ABNORMAL
CREAT SERPL-MCNC: 2.26 MG/DL (ref 0.6–1.3)
CREAT UR-MCNC: 80.4 MG/DL
EOSINOPHIL # BLD AUTO: 0.46 THOUSAND/ΜL (ref 0–0.61)
EOSINOPHIL NFR BLD AUTO: 9 % (ref 0–6)
ERYTHROCYTE [DISTWIDTH] IN BLOOD BY AUTOMATED COUNT: 15.3 % (ref 11.6–15.1)
FERRITIN SERPL-MCNC: 58 NG/ML (ref 8–388)
GFR SERPL CREATININE-BSD FRML MDRD: 26 ML/MIN/1.73SQ M
GLUCOSE P FAST SERPL-MCNC: 141 MG/DL (ref 65–99)
GLUCOSE UR STRIP-MCNC: NEGATIVE MG/DL
HCT VFR BLD AUTO: 30 % (ref 36.5–49.3)
HGB BLD-MCNC: 9.3 G/DL (ref 12–17)
HGB UR QL STRIP.AUTO: NEGATIVE
HYALINE CASTS #/AREA URNS LPF: ABNORMAL /LPF
IMM GRANULOCYTES # BLD AUTO: 0.02 THOUSAND/UL (ref 0–0.2)
IMM GRANULOCYTES NFR BLD AUTO: 0 % (ref 0–2)
IRON SATN MFR SERPL: 12 % (ref 20–50)
IRON SERPL-MCNC: 35 UG/DL (ref 65–175)
KETONES UR STRIP-MCNC: NEGATIVE MG/DL
LEUKOCYTE ESTERASE UR QL STRIP: NEGATIVE
LYMPHOCYTES # BLD AUTO: 0.89 THOUSANDS/ΜL (ref 0.6–4.47)
LYMPHOCYTES NFR BLD AUTO: 18 % (ref 14–44)
MAGNESIUM SERPL-MCNC: 2.2 MG/DL (ref 1.6–2.6)
MCH RBC QN AUTO: 27.3 PG (ref 26.8–34.3)
MCHC RBC AUTO-ENTMCNC: 31 G/DL (ref 31.4–37.4)
MCV RBC AUTO: 88 FL (ref 82–98)
MONOCYTES # BLD AUTO: 0.46 THOUSAND/ΜL (ref 0.17–1.22)
MONOCYTES NFR BLD AUTO: 9 % (ref 4–12)
NEUTROPHILS # BLD AUTO: 3.25 THOUSANDS/ΜL (ref 1.85–7.62)
NEUTS SEG NFR BLD AUTO: 64 % (ref 43–75)
NITRITE UR QL STRIP: NEGATIVE
NON-SQ EPI CELLS URNS QL MICRO: ABNORMAL /HPF
NRBC BLD AUTO-RTO: 0 /100 WBCS
PH UR STRIP.AUTO: 5.5 [PH]
PHOSPHATE SERPL-MCNC: 4.3 MG/DL (ref 2.3–4.1)
PLATELET # BLD AUTO: 171 THOUSANDS/UL (ref 149–390)
PMV BLD AUTO: 11.1 FL (ref 8.9–12.7)
POTASSIUM SERPL-SCNC: 4.3 MMOL/L (ref 3.5–5.3)
PROT UR STRIP-MCNC: ABNORMAL MG/DL
PROT UR-MCNC: 18 MG/DL
PROT/CREAT UR: 0.22 MG/G{CREAT} (ref 0–0.1)
RBC # BLD AUTO: 3.41 MILLION/UL (ref 3.88–5.62)
RBC #/AREA URNS AUTO: ABNORMAL /HPF
SODIUM SERPL-SCNC: 140 MMOL/L (ref 136–145)
SP GR UR STRIP.AUTO: 1.01 (ref 1–1.03)
TIBC SERPL-MCNC: 302 UG/DL (ref 250–450)
URATE SERPL-MCNC: 7.6 MG/DL (ref 4.2–8)
UROBILINOGEN UR STRIP-ACNC: <2 MG/DL
WBC # BLD AUTO: 5.1 THOUSAND/UL (ref 4.31–10.16)
WBC #/AREA URNS AUTO: ABNORMAL /HPF

## 2022-06-20 PROCEDURE — 82570 ASSAY OF URINE CREATININE: CPT

## 2022-06-20 PROCEDURE — 80048 BASIC METABOLIC PNL TOTAL CA: CPT

## 2022-06-20 PROCEDURE — 81001 URINALYSIS AUTO W/SCOPE: CPT

## 2022-06-20 PROCEDURE — 84100 ASSAY OF PHOSPHORUS: CPT

## 2022-06-20 PROCEDURE — 83970 ASSAY OF PARATHORMONE: CPT

## 2022-06-20 PROCEDURE — 84156 ASSAY OF PROTEIN URINE: CPT

## 2022-06-20 PROCEDURE — 84550 ASSAY OF BLOOD/URIC ACID: CPT

## 2022-06-20 PROCEDURE — 85025 COMPLETE CBC W/AUTO DIFF WBC: CPT

## 2022-06-20 PROCEDURE — 83540 ASSAY OF IRON: CPT

## 2022-06-20 PROCEDURE — 83735 ASSAY OF MAGNESIUM: CPT

## 2022-06-20 PROCEDURE — 36415 COLL VENOUS BLD VENIPUNCTURE: CPT

## 2022-06-20 PROCEDURE — 83550 IRON BINDING TEST: CPT

## 2022-06-20 PROCEDURE — 82728 ASSAY OF FERRITIN: CPT

## 2022-06-20 NOTE — PROGRESS NOTES
Pt discharged to home 6/17/22  [facility]  Message left for patient on his voicemail with my name, number and request for return call

## 2022-06-21 ENCOUNTER — TELEPHONE (OUTPATIENT)
Dept: CARDIAC SURGERY | Facility: CLINIC | Age: 77
End: 2022-06-21

## 2022-06-21 LAB — PTH-INTACT SERPL-MCNC: 121.2 PG/ML (ref 18.4–80.1)

## 2022-06-21 NOTE — TELEPHONE ENCOUNTER
Spoke with [facility] this morning regarding his progress since being discharged on 6/17  At that time, his blood pressure meds were adjusted due to elevated BP's, and his creatinine was elevated  The plan was to recheck a BMP on 6/20  Repeat creat on 6/20 was 2 26 (2 45)  He was seen by [facility] on 6/19, and his blood pressure at that time was noted to be 124/50  [facility] states that he is feeling well, that his incision is not draining, denies fevers and chills  He has been slowly increasing his activity, and is doing his IS  Reviewed his upcoming appointments  He will see Dr Zac Gupta on 7/20 for his 30 day TAVR postop visit       Shannon Kumar PA-C

## 2022-06-22 ENCOUNTER — HOME CARE VISIT (OUTPATIENT)
Dept: HOME HEALTH SERVICES | Facility: HOME HEALTHCARE | Age: 77
End: 2022-06-22
Payer: MEDICARE

## 2022-06-22 VITALS
DIASTOLIC BLOOD PRESSURE: 58 MMHG | RESPIRATION RATE: 18 BRPM | HEART RATE: 60 BPM | TEMPERATURE: 98.8 F | OXYGEN SATURATION: 98 % | SYSTOLIC BLOOD PRESSURE: 126 MMHG

## 2022-06-22 PROCEDURE — G0300 HHS/HOSPICE OF LPN EA 15 MIN: HCPCS

## 2022-06-23 ENCOUNTER — PATIENT OUTREACH (OUTPATIENT)
Dept: FAMILY MEDICINE CLINIC | Facility: CLINIC | Age: 77
End: 2022-06-23

## 2022-06-23 ENCOUNTER — HOME CARE VISIT (OUTPATIENT)
Dept: HOME HEALTH SERVICES | Facility: HOME HEALTHCARE | Age: 77
End: 2022-06-23
Payer: MEDICARE

## 2022-06-23 VITALS — DIASTOLIC BLOOD PRESSURE: 60 MMHG | HEART RATE: 70 BPM | SYSTOLIC BLOOD PRESSURE: 160 MMHG | OXYGEN SATURATION: 97 %

## 2022-06-23 PROBLEM — I35.0 AORTIC STENOSIS, SEVERE: Status: RESOLVED | Noted: 2019-11-27 | Resolved: 2022-06-23

## 2022-06-23 PROCEDURE — G0151 HHCP-SERV OF PT,EA 15 MIN: HCPCS

## 2022-06-23 NOTE — PROGRESS NOTES
RN [facility] called patient and discussed Bundle closure and the option for Complex Case Management to continue to follow  Patient states understanding and would like to continue with f/u calls  Patient is s/p TAVR done on 6/14  He states he is doing very well and feels good  He says that the visiting nurse came to see him today and said he will not need any services and his case was closed  Patient has a f/u appointment with her PCP tomorrow and an appointment with her Cardiologist on 7/20  Assessment will need to be done on next follow up call  OPCM to follow

## 2022-06-23 NOTE — PROGRESS NOTES
Falls Plan of Care: balance, strength, and gait training instructions were provided  Home safety education provided  Assessment/Plan:         Problem List Items Addressed This Visit        Digestive    GERD (gastroesophageal reflux disease) - Primary     Patient to continue with present therapy and decrease caffeine, avoid ETOH and smoking to decrease acid production  Pt should also cease eating prior to bedtime and avoid excessive fluid intake prior to sleep  May use antacids as needed for breakthrough GERD  All pateint questions answered today about this condition  Endocrine    Type 2 diabetes mellitus, without long-term current use of insulin ([facility] )     Patient is stable with current meds and discussed a low carb diet  Pt  recommended to see eye doctor and foot doctor for routine screening as per protocol  Recheck A1C  and Cr in 3 months  Patient questions answered today about this condtion  Lab Results   Component Value Date    HGBA1C 5 6 03/23/2022                 Cardiovascular and Mediastinum    PVD (peripheral vascular disease) ([facility] )     Patient is stable  and will continue present plan of care and reassess at next routine visit  All questions about this problem from patient were answered today  Hypertension     Patient is stable with current anti-hypertensive medicine and continue to follow a low sodium diet and take current medication  All questions about this condition were answered today  CAD (coronary artery disease)     Patient to continue  with current cardiac meds to decrease risk of re stenosis  Patient to follow up with cardiology for scheduled appointments to decrease risk for further cardiac problems with appropriate diagnostic testing to reassess cardiac status  Patient had alll questions about this problem answered today             Chronic diastolic CHF (congestive heart failure) ([facility])     Wt Readings from Last 3 Encounters:   06/17/22 75 9 kg (167 lb 5 3 oz)   06/01/22 73 9 kg (163 lb)   05/24/22 72 6 kg (160 lb)   Patient is stable  and will continue present plan of care and reassess at next routine visit  All questions about this problem from patient were answered today  Nervous and Auditory    Cigarette nicotine dependence with nicotine-induced disorder     Patient encouraged to quit using tobacco for that increases their risk for COPD, lung cancer,stroke, oral cancer and heart disease  If patient does not want to quit they should let me know  when they are interested in quitting  There are numerous options to use to quit and we can discuss them  Other    Hyperlipidemia     Patient  is stable with current medication and we discussed a low fat low cholesterol diet  Weight loss also discussed for this will help lower cholesterol also  Recheck lipids in 6 months  S/P TAVR (transcatheter aortic valve replacement)     Pt with bradycardia and some shortness of breath  Put a call out to Cardiology regards to will we can do for him with his beta-blocker  TCM Call (since 5/24/2022)     Date and time call was made  6/14/2022  1:48 PM    Hospital care reviewed  Records reviewed    Patient was hospitialized at  [facility]    Date of Admission  06/14/22    Date of discharge  05/25/22    Diagnosis  aortic stenosois    Disposition  Home    Were the patients medications reviewed and updated  Yes    Current Symptoms  None    Dizziness severity  Mild    Quality Character  Loss of balance    Episode pattern  Rare    Cause  No known event      TCM Call (since 5/24/2022)     Post hospital issues  None    Should patient be enrolled in anticoag monitoring? No    Scheduled for follow up?   Yes    Patients specialists  Cardiologist    Cardiologist name  Dr Telma Carcamo or Radha Chao    Nephrologist name  Margurite Cabot, MD    Did you obtain your prescribed medications  Yes    Do you need help managing your prescriptions or medications  No    Is transportation to your appointment needed  No    I have advised the patient to call PCP with any new or worsening symptoms  [address]  Family    The type of support provided  Emotional    Do you have social support  Yes, as much as I need    Comment  Pt lives alone, Niece lives in apartment below, brother lives in house behind his  Are you recieving any outpatient services  No    Are you recieving home care services  Yes    Types of home care services  Home health aid    Are you using any community resources  No    Current waiver services  No    Have you fallen in the last 12 months  No    Interperter language line needed  No          Subjective:      Patient ID: Terrie Seaman  is a 76 y o  male  This 77-year-old male here today for TCM for status post TAVR surgical procedure  The patient with heart rate at at 44 in symptomatic of hypotension  Patient states that he feels a bit lightheaded and feels like he is going to fall over with his heart rate in the 40s  Put a call to Cardiology to see what advice stay we give regards to his beta-blocker usage and maybe there diet decreasing it or DC it altogether  At time this dictation there recall back to me is pending  Patient is doing otherwise well with his medication and that was reviewed for with him today  I have spoken with Dr Pancho Crespo and he informed me to notify the patient to take 1/2 of his Toprol XL and if he still has lightheadedness and dizziness to get to the ER        The following portions of the patient's history were reviewed and updated as appropriate:   Past Medical History:  He has a past medical history of CAD (coronary artery disease), Carotid stenosis, asymptomatic, bilateral, Chronic kidney disease, Diabetes ([address] ), Diabetes mellitus ([address] ), GERD (gastroesophageal reflux disease), History of nephrolithiasis, Hyperlipidemia, Hypertension, PAD (peripheral artery disease) ([address] ), and Severe aortic stenosis  ,  _______________________________________________________________________  Medical Problems:  does not have any pertinent problems on file ,  _______________________________________________________________________  Past Surgical History:   has a past surgical history that includes Coronary artery bypass graft (2013); Femoral artery - popliteal artery bypass graft; Appendectomy; Colectomy; Tonsillectomy and adenoidectomy; pr slctv cathj 3rd+ ord slctv abdl pel/lxtr brnch (Left, 8/12/2016); Colonoscopy (2013); Hemorrhoid surgery; IR aortagram with run-off (11/19/2018); Cardiac catheterization (N/A, 5/5/2022); Cardiac catheterization (N/A, 5/5/2022); Cardiac catheterization (N/A, 5/24/2022); pr transcatheter transapical replacemt aortic valve (N/A, 6/14/2022); and Cardiac catheterization (N/A, 6/14/2022)  ,  _______________________________________________________________________  Family History:  family history includes Heart attack in his father; Other in his sister; Stroke in his paternal uncle ,  _______________________________________________________________________  Social History:   reports that he has been smoking cigarettes  He has a 12 50 pack-year smoking history  He has never used smokeless tobacco  He reports current alcohol use  He reports that he does not use drugs  ,  _______________________________________________________________________  Allergies:  has No Known Allergies     _______________________________________________________________________  Current Outpatient Medications   Medication Sig Dispense Refill    acetaminophen (TYLENOL) 325 mg tablet Take 3 tablets (975 mg total) by mouth every 8 (eight) hours  0    allopurinol (ZYLOPRIM) 100 mg tablet Take 1 tablet (100 mg total) by mouth daily 90 tablet 3    amLODIPine (NORVASC) 10 mg tablet TAKE 1 TABLET DAILY 90 tablet 3    AMYLASE-LIPASE-PROTEASE PO Take by mouth       aspirin (ECOTRIN LOW STRENGTH) 81 mg EC tablet Take 81 mg by mouth daily      atorvastatin (LIPITOR) 80 mg tablet TAKE 1 TABLET DAILY AT     BEDTIME 90 tablet 3    clopidogrel (PLAVIX) 75 mg tablet Take 1 tablet (75 mg total) by mouth in the morning  90 tablet 3    Cyanocobalamin (VITAMIN B12 PO) Take by mouth once a week        docusate sodium (COLACE) 100 mg capsule Take 1 capsule (100 mg total) by mouth 2 (two) times a day  0    lisinopril (ZESTRIL) 10 mg tablet Take 1 tablet (10 mg total) by mouth daily 30 tablet 1    Magnesium Oxide (MAG-200 PO) Take by mouth once a week       metoprolol succinate (TOPROL-XL) 25 mg 24 hr tablet Take 1 tablet (25 mg total) by mouth daily 30 tablet 1    mupirocin (BACTROBAN) 2 % ointment APPLY INTO EACH NOSTRIL TWICE DAILY      oxyCODONE (ROXICODONE) 5 immediate release tablet Take 2 5 mg every 6 hours as needed for moderate pain or take 5 mg every 8 hours as needed for severe pain 30 tablet 0    pantoprazole (PROTONIX) 40 mg tablet TAKE 1 TABLET DAILY 90 tablet 1    polyethylene glycol (MIRALAX) 17 g packet Take 17 g by mouth daily  0    potassium chloride (K-DUR,KLOR-CON) 20 mEq tablet Take 1 tablet (20 mEq total) by mouth daily 30 tablet 1    torsemide (DEMADEX) 20 mg tablet Take 1 tablet (20 mg total) by mouth 2 (two) times a day 180 tablet 3     No current facility-administered medications for this visit      _______________________________________________________________________  Review of Systems   Constitutional: Negative for activity change, appetite change, chills, fatigue, fever and unexpected weight change  HENT: Negative for congestion, ear pain, hearing loss, mouth sores, postnasal drip, sinus pressure, sinus pain, sneezing and sore throat  Respiratory: Positive for shortness of breath  Negative for apnea, cough and wheezing  Cardiovascular: Negative for chest pain, palpitations and leg swelling     Gastrointestinal: Negative for abdominal pain, constipation, diarrhea, nausea and vomiting  Endocrine: Negative for cold intolerance and heat intolerance  Genitourinary: Negative for dysuria, frequency and hematuria  Musculoskeletal: Negative for arthralgias, back pain, gait problem, joint swelling and neck pain  Skin: Negative for rash  Neurological: Positive for weakness and light-headedness  Negative for dizziness and numbness  Hematological: Does not bruise/bleed easily  Psychiatric/Behavioral: Negative for agitation, behavioral problems, confusion, hallucinations and sleep disturbance  The patient is not nervous/anxious  Objective:  Vitals:    06/24/22 1240   BP: 118/52   BP Location: Left arm   Patient Position: Sitting   Cuff Size: Standard   Pulse: (!) 44   Resp: 16   Temp: 97 7 °F (36 5 °C)   TempSrc: Temporal   SpO2: 99%   Weight: 74 8 kg (165 lb)   Height: 5' 10" (1 778 m)     Body mass index is 23 68 kg/m²  Physical Exam  Vitals and nursing note reviewed  Constitutional:       Appearance: He is well-developed  HENT:      Head: Normocephalic and atraumatic  Nose: Nose normal    Eyes:      General: No scleral icterus  Conjunctiva/sclera: Conjunctivae normal       Pupils: Pupils are equal, round, and reactive to light  Neck:      Thyroid: No thyromegaly  Cardiovascular:      Rate and Rhythm: Regular rhythm  Bradycardia present  Heart sounds: Normal heart sounds  Pulmonary:      Effort: Pulmonary effort is normal  No respiratory distress  Breath sounds: Normal breath sounds  No wheezing  Abdominal:      General: Bowel sounds are normal       Palpations: Abdomen is soft  Tenderness: There is no abdominal tenderness  There is no guarding or rebound  Musculoskeletal:         General: Normal range of motion  Cervical back: Normal range of motion and neck supple  Skin:     General: Skin is warm and dry  Findings: No rash     Neurological:      Mental Status: He is alert and oriented to person, place, and time     Psychiatric:         Behavior: Behavior normal

## 2022-06-23 NOTE — ASSESSMENT & PLAN NOTE
Wt Readings from Last 3 Encounters:   06/17/22 75 9 kg (167 lb 5 3 oz)   06/01/22 73 9 kg (163 lb)   05/24/22 72 6 kg (160 lb)   Patient is stable  and will continue present plan of care and reassess at next routine visit  All questions about this problem from patient were answered today

## 2022-06-23 NOTE — CASE COMMUNICATION
PT Evaluation today 6/23/22  Patient is safe in all transfers and independently ambulating  Aware of post op restrictions  No further skilled PT need  He has returned to gardening within lifting precautions

## 2022-06-23 NOTE — ASSESSMENT & PLAN NOTE
Pt with bradycardia and some shortness of breath  Put a call out to Cardiology regards to will we can do for him with his beta-blocker

## 2022-06-24 ENCOUNTER — TELEPHONE (OUTPATIENT)
Dept: CARDIOLOGY CLINIC | Facility: CLINIC | Age: 77
End: 2022-06-24

## 2022-06-24 ENCOUNTER — OFFICE VISIT (OUTPATIENT)
Dept: FAMILY MEDICINE CLINIC | Facility: CLINIC | Age: 77
End: 2022-06-24
Payer: MEDICARE

## 2022-06-24 VITALS
HEART RATE: 44 BPM | DIASTOLIC BLOOD PRESSURE: 52 MMHG | BODY MASS INDEX: 23.62 KG/M2 | SYSTOLIC BLOOD PRESSURE: 118 MMHG | TEMPERATURE: 97.7 F | RESPIRATION RATE: 16 BRPM | OXYGEN SATURATION: 99 % | WEIGHT: 165 LBS | HEIGHT: 70 IN

## 2022-06-24 DIAGNOSIS — N18.32 TYPE 2 DIABETES MELLITUS WITH STAGE 3B CHRONIC KIDNEY DISEASE, WITHOUT LONG-TERM CURRENT USE OF INSULIN (HCC): ICD-10-CM

## 2022-06-24 DIAGNOSIS — I50.32 CHRONIC DIASTOLIC CHF (CONGESTIVE HEART FAILURE) (HCC): ICD-10-CM

## 2022-06-24 DIAGNOSIS — I10 PRIMARY HYPERTENSION: ICD-10-CM

## 2022-06-24 DIAGNOSIS — F17.219 CIGARETTE NICOTINE DEPENDENCE WITH NICOTINE-INDUCED DISORDER: ICD-10-CM

## 2022-06-24 DIAGNOSIS — E11.22 TYPE 2 DIABETES MELLITUS WITH STAGE 3B CHRONIC KIDNEY DISEASE, WITHOUT LONG-TERM CURRENT USE OF INSULIN (HCC): ICD-10-CM

## 2022-06-24 DIAGNOSIS — I25.10 CORONARY ARTERY DISEASE INVOLVING NATIVE HEART WITHOUT ANGINA PECTORIS, UNSPECIFIED VESSEL OR LESION TYPE: ICD-10-CM

## 2022-06-24 DIAGNOSIS — E78.2 MIXED HYPERLIPIDEMIA: ICD-10-CM

## 2022-06-24 DIAGNOSIS — K21.9 GASTROESOPHAGEAL REFLUX DISEASE WITHOUT ESOPHAGITIS: Primary | ICD-10-CM

## 2022-06-24 DIAGNOSIS — I73.9 PVD (PERIPHERAL VASCULAR DISEASE) (HCC): ICD-10-CM

## 2022-06-24 DIAGNOSIS — Z95.2 S/P TAVR (TRANSCATHETER AORTIC VALVE REPLACEMENT): ICD-10-CM

## 2022-06-24 PROCEDURE — 99215 OFFICE O/P EST HI 40 MIN: CPT | Performed by: FAMILY MEDICINE

## 2022-06-24 NOTE — TELEPHONE ENCOUNTER
Marleen Pack is currently off  Pt is at office, Dr Eddy Ha would like to speak to a md on condition of pt  Pt is in office currently   Pt is having HR 44 and lightheadness  Had a ELANA on 6/15/2022    Metoprolol succinate 25 mg qd      C/b [phone] Dr Eddy Ha

## 2022-06-27 ENCOUNTER — TELEPHONE (OUTPATIENT)
Dept: NEPHROLOGY | Facility: CLINIC | Age: 77
End: 2022-06-27

## 2022-06-27 NOTE — TELEPHONE ENCOUNTER
Appointment Confirmation   Person confirmed appointment with  If not patient, name of the person Answering Machine    Date and time of appointment 6/28 2    Patient acknowledged and will be at appointment? no    Did you advise the patient that they will need a urine sample if they are a new patient?  N/A    Did you advise the patient to bring their current medications for verification? (including any OTC) Yes    Additional Information

## 2022-06-28 ENCOUNTER — OFFICE VISIT (OUTPATIENT)
Dept: NEPHROLOGY | Facility: CLINIC | Age: 77
End: 2022-06-28
Payer: MEDICARE

## 2022-06-28 VITALS
HEART RATE: 54 BPM | DIASTOLIC BLOOD PRESSURE: 52 MMHG | SYSTOLIC BLOOD PRESSURE: 142 MMHG | BODY MASS INDEX: 23.77 KG/M2 | WEIGHT: 166 LBS | HEIGHT: 70 IN

## 2022-06-28 DIAGNOSIS — I12.9 BENIGN HYPERTENSION WITH CKD (CHRONIC KIDNEY DISEASE) STAGE IV (HCC): ICD-10-CM

## 2022-06-28 DIAGNOSIS — N18.4 CONTROLLED TYPE 2 DIABETES MELLITUS WITH STAGE 4 CHRONIC KIDNEY DISEASE, WITHOUT LONG-TERM CURRENT USE OF INSULIN (HCC): ICD-10-CM

## 2022-06-28 DIAGNOSIS — N25.81 SECONDARY HYPERPARATHYROIDISM OF RENAL ORIGIN (HCC): ICD-10-CM

## 2022-06-28 DIAGNOSIS — N18.4 STAGE 4 CHRONIC KIDNEY DISEASE (HCC): Primary | ICD-10-CM

## 2022-06-28 DIAGNOSIS — R80.1 PERSISTENT PROTEINURIA, UNSPECIFIED: ICD-10-CM

## 2022-06-28 DIAGNOSIS — I50.32 CHRONIC DIASTOLIC CHF (CONGESTIVE HEART FAILURE) (HCC): ICD-10-CM

## 2022-06-28 DIAGNOSIS — N18.4 BENIGN HYPERTENSION WITH CKD (CHRONIC KIDNEY DISEASE) STAGE IV (HCC): ICD-10-CM

## 2022-06-28 DIAGNOSIS — E11.22 CONTROLLED TYPE 2 DIABETES MELLITUS WITH STAGE 4 CHRONIC KIDNEY DISEASE, WITHOUT LONG-TERM CURRENT USE OF INSULIN (HCC): ICD-10-CM

## 2022-06-28 PROBLEM — N18.30 BENIGN HYPERTENSION WITH CHRONIC KIDNEY DISEASE, STAGE III (HCC): Status: ACTIVE | Noted: 2022-06-28

## 2022-06-28 PROCEDURE — 99214 OFFICE O/P EST MOD 30 MIN: CPT | Performed by: INTERNAL MEDICINE

## 2022-06-28 NOTE — PATIENT INSTRUCTIONS
-Renal Function is stable  -You have Chronic Kidney Disease Stage 4  -Avoid NSAIDs like Ibuprofen/Motrin, Naproxen/Aleve, Celebrex, meloxicam/Mobic, Diclofenac and other NSAIDs   -Okay to take Acetaminophen/Tylenol if you do not have any liver problems  -Avoid IV contrast used for CT scan and cardiac catheterization     -If plan for any study with IV contrast, please let me know so we could hydrate with fluids before and after IV contrast  -Dosage  of certain medications may need to be adjusted depending on Kidney function  Blood pressure is stable recommend discussion with Cardiology regarding metoprolol causing bradycardia/slow heart rate  -Recommend 2 g sodium diet  -Recommend daily exercise and weight loss  -Discussed home monitoring of BP and maintaining a log of blood pressure   -Recommend goal BP less than 130/80  Please inform me if SBP below 110 or above 140's persistently      Blood work in 2 months, follow-up in 4 months/October with repeat blood work and urine test

## 2022-06-28 NOTE — PROGRESS NOTES
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 BP acceptable  Continue same medications   ? Recommend discussion with cardiology regarding metoprolol as HR less than 55   -Recommend 2 g sodium diet     -Recommend daily exercise and weight loss  -Discussed home monitoring of BP and maintaining a log of blood pressure   -Recommend goal BP less than 130/80  Chronic diastolic CHF/severe aortic stenosis status post TAVR  -Echo from 6/5- was EF 60 %  Diastolic dysfunction  -on torsemide 20 mg twice daily  Dose of torsemide was decreased to 20 mg twice daily on March 31st by Cardiology    -Recommend fluid restriction 1 8 L/min  -says weight same -continue weight monitoring, recommend taking extra torsemide in future if any weight gain more than 3 lb in a day or 5 lb in a week and to follow low-sodium diet     Proteinuria, persistent  -195 microalbumin/cr ratio in dec 2021  -UPC ratio improving to UPC ratio of 0 22 from 0 46  improving due to recent use of ACEI  -likely due to hypertensive nephrosclerosis  -continue lisinopril       Hypomagnesemia  On mag oxide 200 mg weekly  Had very low magnesium level back in 2015  -last Mag level wnl  -likely due to use of PPI    Hyperphosphatemia- level 4 3   -recommend low phosphorus diet   -list provided     Secondary hyperparathyroidism of renal origin  - 2 from 112  -monitor PTH and will check vitamin D level       Anemia due to CKD stage 4 and due to iron deficiency  -hb 9 3 g/dl , iron stores- iron sat 12 %  -recommend oral iron tablets 325 mg ferrous sulfate daily  C/o constipation with iron    -says wants to discuss with hematologist  Yodit Shields to give iv iron if he agrees- did offer today but wants to wait     B/L nephrolithiasis/left renal cyst  -currently asymptomatic and patient not aware  -reviewed CT abdomen report from April 2022, finding of nonobstructing bilateral nephrolithiasis  Largest 8 mm inferior pole of left kidney    -Pt asymptomatic   - no monitoring needed for renal cyst as it was a simple parapelvic cyst  Not mentioned about  Renal cyst       Hyperlipidemia  -on Lipitor  - recommend goal LDL less than 100  -last lipid panel was at goal      DM-2 controlled, with chronic kidney disease stage 4  -now off metformin-continue to hold metformin as GFR less than 30  -last A1c was 5 6 in March , currently on diet control  -continue management per PCP     Smoking/tobacco use, stressed on quitting smoking but patient not interested at this time    Hyperuricemia  -on allopurinol   -uric acid improved to 7  6       Colitis  -recent colonoscopy Colonoscopy: Superficial ulceration in the right colon around ileo colonic anastomotic site  Noted plan for repeat in 3 yrs    Biopsy negative    -continue to follow-up with GI    Patient Instructions   -Renal Function is stable  -You have Chronic Kidney Disease Stage 4  -Avoid NSAIDs like Ibuprofen/Motrin, Naproxen/Aleve, Celebrex, meloxicam/Mobic, Diclofenac and other NSAIDs   -Okay to take Acetaminophen/Tylenol if you do not have any liver problems  -Avoid IV contrast used for CT scan and cardiac catheterization     -If plan for any study with IV contrast, please let me know so we could hydrate with fluids before and after IV contrast  -Dosage  of certain medications may need to be adjusted depending on Kidney function  Blood pressure is stable recommend discussion with Cardiology regarding metoprolol causing bradycardia/slow heart rate  -Recommend 2 g sodium diet  -Recommend daily exercise and weight loss  -Discussed home monitoring of BP and maintaining a log of blood pressure   -Recommend goal BP less than 130/80  Please inform me if SBP below 110 or above 140's persistently  Blood work in 2 months, follow-up in 4 months/October with repeat blood work and urine test       Diagnoses and all orders for this visit:    Stage 4 chronic kidney disease ([facility] )  -     Basic metabolic panel; Future  -     Basic metabolic panel; Future  -     Protein / creatinine ratio, urine; Future  -     CBC; Future  -     Magnesium; Future  -     Phosphorus; Future  -     PTH, intact; Future  -     Uric acid; Future  -     Urinalysis with microscopic; Future    Benign hypertension with CKD (chronic kidney disease) stage IV ([facility])  -     Basic metabolic panel; Future    Chronic diastolic CHF (congestive heart failure) ([facility])  -     Basic metabolic panel; Future    Persistent proteinuria, unspecified  -     Protein / creatinine ratio, urine; Future  -     Urinalysis with microscopic; Future    Secondary hyperparathyroidism of renal origin ([facility] )  -     PTH, intact; Future  -     Vitamin D 25 hydroxy;  Future            SUBJECTIVE / HPI:  Lori Alaniz  is a 76 y o   male  with medical issues of chronic kidney disease, HTN x 15 yrs,  DM-2 anemia, colitis  , CAD s/p CABG who presents for follow-up of chronic kidney disease stage 3      Review of records, patient has elevated creatinine dating back to 2018 November when the creatinine was 1 3   It was stable at 1 4 to 1 7 in 2020   Since June 2021 creatinine has been around 1 6-1 8  It had worsened to creatinine 2 49 on 12/14/21, likely prerenal in the setting of abdominal pain and diarrhea and renal function improved to creatinine 1 7-1 8 in January 2022  Patient with baseline creatinine 1 6-1 8 he was at [facility] and underwent cardiac PCI on 05/24/22  Creatinine since May has been around 2 3-2 4, improved to 2 0 on 05/25 likely due to hydration during PCI  He was again admitted from June 14 to June 17, underwent TAVR on June 14th  Renal function has been improving since then, creatinine was 2 45 on 06/17 but improved to 2 26 on 06/20 with stable electrolytes  Hemoglobin stable at 9 3 with iron saturation 12%  PTH level elevated at 121  Upc ratio 0 22    C/o dizziness and dose of metoprolol was decreased by cardiology  BP at home not done recently  No SOB or leg edema  Retired Dentist       REVIEW OF SYSTEMS:    Review of Systems   Constitutional: Negative for activity change, appetite change, chills, diaphoresis, fatigue and fever  HENT: Negative for congestion, facial swelling and nosebleeds  Eyes: Negative for pain and visual disturbance  Respiratory: Negative for cough, chest tightness and shortness of breath  Cardiovascular: Negative for chest pain and palpitations  Gastrointestinal: Negative for abdominal distention, abdominal pain, diarrhea, nausea and vomiting  Genitourinary: Negative for difficulty urinating, dysuria, flank pain, frequency and hematuria  Musculoskeletal: Negative for arthralgias, back pain and joint swelling  Skin: Negative for rash  Neurological: Negative for dizziness, seizures, syncope, weakness and headaches  Psychiatric/Behavioral: Negative for agitation and confusion  The patient is not nervous/anxious          More than 10 point review of systems were obtained and discussed in length with the patient  Complete review of systems were negative / unremarkable except mentioned above  PAST MEDICAL HISTORY:  Past Medical History:   Diagnosis Date    CAD (coronary artery disease)     Carotid stenosis, asymptomatic, bilateral     Chronic kidney disease     Diabetes ([facility] )     type 2, non-insulin dependent    Diabetes mellitus ([facility] )     GERD (gastroesophageal reflux disease)     History of nephrolithiasis     Hyperlipidemia     Hypertension     PAD (peripheral artery disease) ([facility])     Severe aortic stenosis        PAST SURGICAL HISTORY:  Past Surgical History:   Procedure Laterality Date    APPENDECTOMY      CARDIAC CATHETERIZATION N/A 5/5/2022    Procedure: CARDIAC RHC/LHC; Surgeon: Silvester Hodgkins, DO;  [facility]; Service: Cardiology    CARDIAC CATHETERIZATION N/A 5/5/2022    Procedure: Cardiac Coronary Angiogram;  Surgeon: Silvester Hodgkins, DO;  [facility]; Service: Cardiology    CARDIAC CATHETERIZATION N/A 5/24/2022    Procedure: Cardiac pci;  Surgeon: Parker Duane, MD;  [facility];   Service: Cardiology    CARDIAC CATHETERIZATION N/A 6/14/2022    Procedure: CARDIAC TAVR;  Surgeon: Israel Bartlett MD;  Location: [facility];  Service: Cardiology    COLECTOMY      COLONOSCOPY  2013    CORONARY ARTERY BYPASS GRAFT  2013    X 2    FEMORAL ARTERY - POPLITEAL ARTERY BYPASS GRAFT      HEMORRHOID SURGERY      IR AORTAGRAM WITH RUN-OFF  11/19/2018    MA SLCTV CATHJ 3RD+ ORD SLCTV ABDL PEL/LXTR [facility] Left 8/12/2016    Procedure: LEFT FEMORAL ARTERIOGRAM; BALLOON ANGIOPLASTY; SFA  AND FEMORAL AT VEIN GRAFT;  Surgeon: Jeferson Patel MD;  Location: [facility];  Service: Vascular    MA TRANSCATHETER TRANSAPICAL REPLACEMT AORTIC VALVE N/A 6/14/2022    Procedure: REPLACEMENT AORTIC VALVE TRANSCATHETER (TAVR) TRANSAPICAL 29MM IRVIN KYLER S3 ULTRA VALVE(ACCESS ON LEFT) ELANA;  Surgeon: Michelle Joseph DO;  Location[facility];  Service: Cardiac Surgery    TONSILLECTOMY AND ADENOIDECTOMY         SOCIAL HISTORY:  Social History     Substance and Sexual Activity   Alcohol Use Yes    Comment: rare     Social History     Substance and Sexual Activity   Drug Use No     Social History     Tobacco Use   Smoking Status Current Every Day Smoker    Packs/day: 0 25    Years: 50 00    Pack years: 12 50    Types: Cigarettes   Smokeless Tobacco Never Used   Tobacco Comment    3-4 cigarettes daily       FAMILY HISTORY:  Family History   Problem Relation Age of Onset    Heart attack Father     Other Sister         bypass and vlave replacement    Stroke Paternal Uncle     Arrhythmia Neg Hx     Asthma Neg Hx     Clotting disorder Neg Hx     Fainting Neg Hx     Anuerysm Neg Hx     Hypertension Neg Hx         unsure     Hyperlipidemia Neg Hx     Heart failure Neg Hx        MEDICATIONS:    Current Outpatient Medications:     acetaminophen (TYLENOL) 325 mg tablet, Take 3 tablets (975 mg total) by mouth every 8 (eight) hours, Disp: , Rfl: 0    allopurinol (ZYLOPRIM) 100 mg tablet, Take 1 tablet (100 mg total) by mouth daily, Disp: 90 tablet, Rfl: 3    amLODIPine (NORVASC) 10 mg tablet, TAKE 1 TABLET DAILY, Disp: 90 tablet, Rfl: 3    AMYLASE-LIPASE-PROTEASE PO, Take by mouth , Disp: , Rfl:     aspirin (ECOTRIN LOW STRENGTH) 81 mg EC tablet, Take 81 mg by mouth daily, Disp: , Rfl:     atorvastatin (LIPITOR) 80 mg tablet, TAKE 1 TABLET DAILY AT     BEDTIME, Disp: 90 tablet, Rfl: 3    clopidogrel (PLAVIX) 75 mg tablet, Take 1 tablet (75 mg total) by mouth in the morning , Disp: 90 tablet, Rfl: 3    Cyanocobalamin (VITAMIN B12 PO), Take by mouth once a week  , Disp: , Rfl:     docusate sodium (COLACE) 100 mg capsule, Take 1 capsule (100 mg total) by mouth 2 (two) times a day, Disp: , Rfl: 0    lisinopril (ZESTRIL) 10 mg tablet, Take 1 tablet (10 mg total) by mouth daily, Disp: 30 tablet, Rfl: 1    Magnesium Oxide (MAG-200 PO), Take by mouth once a week , Disp: , Rfl:     metoprolol succinate (TOPROL-XL) 25 mg 24 hr tablet, Take 1 tablet (25 mg total) by mouth daily (Patient taking differently: Take 12 5 mg by mouth 2 (two) times a day), Disp: 30 tablet, Rfl: 1    mupirocin (BACTROBAN) 2 % ointment, APPLY INTO EACH NOSTRIL TWICE DAILY, Disp: , Rfl:     oxyCODONE (ROXICODONE) 5 immediate release tablet, Take 2 5 mg every 6 hours as needed for moderate pain or take 5 mg every 8 hours as needed for severe pain, Disp: 30 tablet, Rfl: 0    pantoprazole (PROTONIX) 40 mg tablet, TAKE 1 TABLET DAILY, Disp: 90 tablet, Rfl: 1    polyethylene glycol (MIRALAX) 17 g packet, Take 17 g by mouth daily, Disp: , Rfl: 0    potassium chloride (K-DUR,KLOR-CON) 20 mEq tablet, Take 1 tablet (20 mEq total) by mouth daily, Disp: 30 tablet, Rfl: 1    torsemide (DEMADEX) 20 mg tablet, Take 1 tablet (20 mg total) by mouth 2 (two) times a day, Disp: 180 tablet, Rfl: 3      PHYSICAL EXAM:  Vitals:    06/28/22 1425   BP: 142/52   BP Location: Right arm   Patient Position: Sitting   Cuff Size: Standard   Pulse: (!) 54   Weight: 75 3 kg (166 lb)   Height: 5' 10" (1 778 m)     Body mass index is 23 82 kg/m²  Physical Exam  Constitutional:       General: He is not in acute distress  Appearance: He is well-developed  He is not diaphoretic  HENT:      Head: Normocephalic and atraumatic  Mouth/Throat:      Mouth: Mucous membranes are moist    Eyes:      General: No scleral icterus  Conjunctiva/sclera: Conjunctivae normal       Pupils: Pupils are equal, round, and reactive to light  Neck:      Thyroid: No thyromegaly  Cardiovascular:      Rate and Rhythm: Normal rate and regular rhythm  Heart sounds: Murmur (ESM+) heard  No friction rub  Pulmonary:      Effort: Pulmonary effort is normal  No respiratory distress  Breath sounds: Normal breath sounds  No wheezing or rales     Abdominal:      General: Bowel sounds are normal  There is no distension  Palpations: Abdomen is soft  Tenderness: There is no abdominal tenderness  Musculoskeletal:         General: No deformity  Cervical back: Neck supple  Right lower leg: No edema  Left lower leg: No edema  Lymphadenopathy:      Cervical: No cervical adenopathy  Skin:     Coloration: Skin is not pale  Nails: There is no clubbing  Neurological:      Mental Status: He is alert and oriented to person, place, and time  He is not disoriented  Psychiatric:         Mood and Affect: Mood normal  Mood is not anxious  Affect is not inappropriate  Behavior: Behavior normal          Thought Content:  Thought content normal          Lab Results:   Results for orders placed or performed in visit on 06/20/22   Magnesium   Result Value Ref Range    Magnesium 2 2 1 6 - 2 6 mg/dL   Phosphorus   Result Value Ref Range    Phosphorus 4 3 (H) 2 3 - 4 1 mg/dL   Protein / creatinine ratio, urine   Result Value Ref Range    Creatinine, Ur 80 4 mg/dL    Protein Urine Random 18 mg/dL    Prot/Creat Ratio, Ur 0 22 (H) 0 00 - 0 10   PTH, intact   Result Value Ref Range     2 (H) 18 4 - 80 1 pg/mL   Urinalysis with microscopic   Result Value Ref Range    Color, UA Light Yellow     Clarity, UA Clear     Specific [place], UA 1 012 1 003 - 1 030    pH, UA 5 5 4 5, 5 0, 5 5, 6 0, 6 5, 7 0, 7 5, 8 0    Leukocytes, UA Negative Negative    Nitrite, UA Negative Negative    Protein, UA Trace (A) Negative mg/dl    Glucose, UA Negative Negative mg/dl    Ketones, UA Negative Negative mg/dl    Urobilinogen, UA <2 0 <2 0 mg/dl mg/dl    Bilirubin, UA Negative Negative    Occult Blood, UA Negative Negative    RBC, UA None Seen None Seen, 1-2 /hpf    WBC, UA None Seen None Seen, 1-2 /hpf    Epithelial Cells None Seen None Seen, Occasional /hpf    Bacteria, UA Occasional None Seen, Occasional /hpf    Hyaline Casts, UA 0-3 (A) None Seen /lpf   Uric acid   Result Value Ref Range    Uric Acid 7 6 4 2 - 8 0 mg/dL   Basic metabolic panel   Result Value Ref Range    Sodium 140 136 - 145 mmol/L    Potassium 4 3 3 5 - 5 3 mmol/L    Chloride 106 100 - 108 mmol/L    CO2 27 21 - 32 mmol/L    ANION GAP 7 4 - 13 mmol/L    BUN 53 (H) 5 - 25 mg/dL    Creatinine 2 26 (H) 0 60 - 1 30 mg/dL    Glucose, Fasting 141 (H) 65 - 99 mg/dL    Calcium 9 3 8 3 - 10 1 mg/dL    eGFR 26 ml/min/1 73sq m   CBC and differential   Result Value Ref Range    WBC 5 10 4 31 - 10 16 Thousand/uL    RBC 3 41 (L) 3 88 - 5 62 Million/uL    Hemoglobin 9 3 (L) 12 0 - 17 0 g/dL    Hematocrit 30 0 (L) 36 5 - 49 3 %    MCV 88 82 - 98 fL    MCH 27 3 26 8 - 34 3 pg    MCHC 31 0 (L) 31 4 - 37 4 g/dL    RDW 15 3 (H) 11 6 - 15 1 %    MPV 11 1 8 9 - 12 7 fL    Platelets 762 131 - 972 Thousands/uL    nRBC 0 /100 WBCs    Neutrophils Relative 64 43 - 75 %    Immat GRANS % 0 0 - 2 %    Lymphocytes Relative 18 14 - 44 %    Monocytes Relative 9 4 - 12 %    Eosinophils Relative 9 (H) 0 - 6 %    Basophils Relative 0 0 - 1 %    Neutrophils Absolute 3 25 1 85 - 7 62 Thousands/µL    Immature Grans Absolute 0 02 0 00 - 0 20 Thousand/uL    Lymphocytes Absolute 0 89 0 60 - 4 47 Thousands/µL    Monocytes Absolute 0 46 0 17 - 1 22 Thousand/µL    Eosinophils Absolute 0 46 0 00 - 0 61 Thousand/µL    Basophils Absolute 0 02 0 00 - 0 10 Thousands/µL   Iron Saturation %   Result Value Ref Range    Iron Saturation 12 (L) 20 - 50 %    TIBC 302 250 - 450 ug/dL    Iron 35 (L) 65 - 175 ug/dL   Ferritin   Result Value Ref Range    Ferritin 58 8 - 388 ng/mL

## 2022-06-29 ENCOUNTER — HOME CARE VISIT (OUTPATIENT)
Dept: HOME HEALTH SERVICES | Facility: HOME HEALTHCARE | Age: 77
End: 2022-06-29
Payer: MEDICARE

## 2022-06-29 VITALS
RESPIRATION RATE: 17 BRPM | WEIGHT: 166 LBS | OXYGEN SATURATION: 99 % | HEART RATE: 55 BPM | TEMPERATURE: 97.4 F | SYSTOLIC BLOOD PRESSURE: 140 MMHG | BODY MASS INDEX: 23.82 KG/M2 | DIASTOLIC BLOOD PRESSURE: 50 MMHG

## 2022-06-29 PROCEDURE — G0299 HHS/HOSPICE OF RN EA 15 MIN: HCPCS

## 2022-06-30 ENCOUNTER — PATIENT OUTREACH (OUTPATIENT)
Dept: FAMILY MEDICINE CLINIC | Facility: CLINIC | Age: 77
End: 2022-06-30

## 2022-06-30 NOTE — PROGRESS NOTES
Call to patient, he is receiving home health visits  Has follow up appointments scheduled with cardiology  Denies further complex needs at this time  Provided my name and number if future assistance needed  Will close

## 2022-07-05 PROBLEM — F17.200 SMOKER: Status: RESOLVED | Noted: 2022-03-22 | Resolved: 2022-07-05

## 2022-07-05 NOTE — PROGRESS NOTES
Cardiology  Heart Failure   Follow Up Office Visit Note -     Jim Mcdermott    76 y o    male   MRN: [id number]  [address]  [address] [place] [address] Sheila Cid  [id number] [phone]    PCP: Edelmira Canales MD  Cardiologist : Dr Junior Eli: Dr Trino Tracy              Summary of Recommendations  Low-sodium diet, Heart failure education as below  Smoking cessation imperative  Patient not interested in quitting  Lifelong SBE prophylaxis  Continue Toprol 12 5 mg daily as the patient has been doing in the last few days  I recommend torsemide 20 mg b i d  He has been taking it daily  This could help his blood pressure  Pharmacological nuclear stress test given EKG changes  He has follow-up echocardiogram scheduled at the end of the month  Follow-up with Dr Cam Wilson in 6 weeks  Colon Ca screenin2021 , up-to-date      Impression/plan  Aortic stenosis, severe, S/P # 29 mm Smith Howie 3 TAVR adm -22  · EKG today:  Sinus bradycardia 55 beats per minute  T-wave inversions most pronounced anteriorly laterally  He does have some nonspecific changes inferiorly  There is no LVH noted on his last echocardiogram   Will pursue a nuclear stress test, an echocardiogram has been requested for the end the month  ·  Lifelong SBE prophylaxis  CAD   On aspirin, Plavix, statin, metoprolol succinate 12 5 mg b i d  beginning 22  The patient has been taking metoprolol 12 5 mg daily  · S/P  CABG x2 2013  · LHC 22: BURAK x 1 to pRCA, 10% residual stenosis, 50% mid RCA lesion medically managed; Patent LIMA to LAD, and patent SVG to OM(pre TAVR workup)  Chronic diastolic heart failure  Wt Readings from Last 3 Encounters:   22 75 3 kg (166 lb)   22 75 3 kg (166 lb)   22 75 3 kg (166 lb)     --beta-blocker:  historically none, given bradycardia and nocturnal pauses    He was started back on metoprolol succinate 12 5 mg b i d  after his TAVR   Reduce to daily  --Diuretic:   torsemide 20 mg b i d  potassium 20 mEq daily, magnesium oxide 200 mg daily once a week  His torsemide was reduced by me May 31st  --ACE/ARB/ARNI:   lisinopril 10 mg daily  In the past he was on benazepril which was stopped due to worsening CKD  Will check a BMP  --MRA:   --2 g sodium diet, 1800 cc fluid restriction  Daily weights, call weight gain 2-3 lb in 1 day or 5 lb in 5 days  Sinus bradycardia- while hospitalized on telemetry, in the past:  Episodes of  sinus bradycardia into the 40s-50s with PACs and at times competing junctional beats     Started back on low-dose beta-blocker after his TAVR  Is now reduced to daily   Hypertension, essential  /50  On amlodipine 10 mg daily, lisinopril 10 mg daily, loop diuretic, low-dose beta-blocker  Hyperlipidemia  On atorvastatin 80 mg/d   5/5/22 LDL 33 non HDL 43  CKD  Baseline creatinine 1 6-1 8  Creatinine since May has been around 2 3-2 4, Last creatinine 2 26  Follows with Nephrology Dr Ce Peter  PVD  Followed by vascular surgery  · Bilateral carotid stenosis  · Status post left lower extremity revascularization  Iron deficiency anemia  6/10/22: hemoglobin 9 3 hematocrit 30  He is considering going back on oral iron  History ischemic colitis  Declines oral iron given constipation  Plan for repeat colonoscopy 3 years  Tobacco abuse ongoing  cessation imperative  He has cut down to about 4-5 cigarettes a day  Encouraged to cut back further with the aim of quitting completely  Cardiac testing  · TTE 5/6/21 EF 60%  No are WMA  Grade 1 DD  Mild left atrial enlargement  Mild MR  Severe aortic stenosis  Peak gradient 63 mean gradient 32  LUIS 1 cm2 by the continuity equation   TTE 3/24/22 EF 50%  Grade 2 DD  Mild LVH  Moderate hypokinesis of the basal to mid inferior wall  RV size and function normal   Mild left atrial enlargement  Mild AI, Severe aortic stenosis    Moderate MR   Holter monitor 4/5/22 occasional bradycardia with maximum of 2 7 second pauses   [facility] 5/5/22  Patent LIMA-LAD; Patent SVG-OM; Ungrafted proximal RCA with calcific lesion requiring PCI pre TAVR consideration        Due to CKD with creatinine 2 3-2 8 range (GFR <25) will need staged complex PCI with pre admission hydration night before   [facility] 5/24/22 PCI/BURAK proximal RCA lesion  Residual 10% stenosis  50% mid RCA lesion medically managed   TTE 6/15/22  EF 60%  Grade 1 DD  Mild LAE  There is an Smith KYLER 3 29 mm TAVR bioprosthetic valve  The prosthetic valve appears well-seated and appears to be functioning normally  Prosthetic valve leaflet motion is normal  There is no evidence of paravalvular regurgitation  There is no evidence of transvalvular regurgitation  The gradient recorded across the prosthetic aortic valve is within the expected range  The aortic valve peak velocity is 1 84 m/s  The aortic valve mean gradient is 8 mmHg  The DVI is 0 49  The aortic valve area is 2 04 cm2  Mitral Valve: There is mild annular calcification  No evidence of MR                ANNA  Deshawn Manning  is a 76y o  year old male with CAD, AS, PVD, HTN, HL, CKD, ongoing tobacco abuse and chronic anemia  He underwent CABG x2 September 2013  His ejection fraction at that time was preserved  He has had left lower extremity revascularization and carotid disease, followed by vascular surgery  He has also had sinus bradycardia which has improved, with the discontinuation of beta-blockers  Due to progressive severe symptomatic AS he was referred to CT surgery for evaluation for TAVR  He was seen in the office September 2020 by Dr Candelario Mail  At that time the patient decided to pursue evaluation of neurological complaints prior to consideration for TAVR  He has followed with Dr Falcon Case  He was last seen in the office 3/2/22    Encouraged follow-up with CT surgery  Home meds include amlodipine 10 mg daily, benazepril 40 mg daily, and hydralazine 50 mg t i d   Patient not on diuretics at home    Adm 3/22-3/25/22  CC:  Shortness of breath, DEMARCO times 1-2 weeks, lower extremity edema, orthopnea  HsTn : 674 --> 588 --> 727  ECG - NSR, LVh with repolarization abnormalities  CXR -vascular congestion and small right effusion, NT proBNP 34 000  Creatinine was 2 67, from baseline of 1 7-1 8  Diuresed with Lasix 80 IV b i d   Echo: EF 50, grade 2 DD  Severe AS  Discharge creatinine :  2 5   Discharge weight 167 lb   Discharge diuretic:  Torsemide 60 mg daily  Hydralazine and benazepril were discontinued      3/28/22   labs: Cr 2 85, BUN 58  K 4 3  Hemoglobin 8 1 hematocrit 27 4  H&H March 25th were 7 1/ 22 9      3/31/22  Hospital follow-up: acute diastolic heart failure secondary to severe aortic stenosis, CKD 4  EF 50  CTS eval 4/22  ROS:  Overall he tells me his breathing has improved  No chest pain  He is able to lie flat, no orthopnea  DEMARCO with steps  No CP  No lightheadedness or dizziness  He is quite aware of his labs, and concerned about his renal function   His weight is down  Wt Readings from Last 3 Encounters:   07/07/22 75 3 kg (166 lb)   06/29/22 75 3 kg (166 lb)   06/28/22 75 3 kg (166 lb)   Blood pressure 110/58  Heart rate today 59 and regular by exam  Social history:  I suspect some sodium dietary indiscretion in the past   We discussed the importance of a salt restricted diet today  We reviewed his most recent labs, concern is his renal function, and anemia, bradycardia while hospitalized  He is not taking the iron supplement because it is constipating   No melena or hematochezia  Today will decrease his torsemide to 20 mg b i d , reassess his labs in 1 week   In review his meds he is also still on metformin given his CKD, recommend he stop it and follow-up with his PCP as well as his nephrologist      Interval history  LHC 80% RCA lesion        5/11/22 OV Dr Desiree Jay  + DEMARCO  Holter monitor demonstrated occasional bradycardia with maximum of 2 7 second pauses  Bradycardia, improved off beta-blockers  Thinking about TAVR  Issues with ischemic colitis, starting iron  Follow-up 3 months    Adm 5/23-5/25/22  [facility]: PCI / BURAK x 1 to pRCA, 10% residual stenosis; 50% mid RCA-medically managed; Patent LIMA to LAD, and patent SVG to OM  DAPT with aspirin and Plavix  Followed by Nephrology  Discharge creatinine 2 0    Adm 6/14-6/17/22  Elective TAVR  Postprocedure echo shows an EF of 60%, a well-seated aortic valve, no MR  Discharged on metoprolol succinate 12 5 mg b i d  for bigeminy with PVCs  Started on lisinopril 10 mg daily  Discharge creatinine 2 45  Discharge weight: 167 lb      6/28  Office visit Nephrology  Current meds: Amlodipine 10 mg daily, lisinopril 10 mg, Metoprolol 12 5 mg bid, Torsemide 20 mg bid   BP high in the office but acceptable at home no med changes  Recommend continuing lisinopril  Declined iron, given constipation    7/7/22  He is a 76y o  year old male with CAD, AS, PVD, HTN, HL, CKD, ongoing tobacco abuse and chronic anemia  He underwent CABG x2 September 2013  Found  To have worsening AS, referred to CT surgery  [facility] 5/24/22: BURAK x 1 to pRCA, 10% residual stenosis, 50% mid RCA lesion medically managed; Patent LIMA to LAD, and patent SVG to OM (pre TAVR workup)  TAVR 6/14/22  EF preserved  ROS: occ dizzy  He backed off on some of his meds- still symptomatic  C/O leg heaviness with walking  Denies chest pain, SOB  Mostly concerned about his dizziness  Currently on On Toprol 12 5 daily, torsemide 20/d-  he's been taking it daily the last few days given nocturia  K 20 meq/d  Mag ox 200 weekly  Last labs 6/20; cr 2 26  BUN 53  K 4 3  H/H  9 3/30  Wt stable 166 lb  BP up 162/45 by the automated machine by me  Left arm  EKG sinus bradycardia first-degree AV block 55 beats per minute deep T-wave inversions most pronounced anteriorly laterally    Nonspecific changes inferiorly  I reviewed his case in EKG briefly with Dr Apple Aden was in the office today  Recommended stress test and echocardiogram   The echo has previously been scheduled  Will pursue a nuclear stress test   The patient is in agreement  For now, will continue Toprol 12 5 mg daily, encouraged torsemide 20 mg b i d  This may help his blood pressure is well        I have spent 40minutes with Patient  today in which greater than 50% of this time was spent in counseling/coordination of care regarding Intructions for management, Patient and family education, Importance of tx compliance and Risk factor reductions  Assessment  Diagnoses and all orders for this visit:    Hospital discharge follow-up    S/P TAVR (transcatheter aortic valve replacement)  -     POCT ECG  -     Discontinue: metoprolol succinate (TOPROL-XL) 25 mg 24 hr tablet; Take 0 5 tablets (12 5 mg total) by mouth daily  -     NM myocardial perfusion spect (rx stress and/or rest); Future  -     metoprolol succinate (TOPROL-XL) 25 mg 24 hr tablet; Take 0 5 tablets (12 5 mg total) by mouth daily  -     potassium chloride (K-DUR,KLOR-CON) 20 mEq tablet; Take 1 tablet (20 mEq total) by mouth daily    Chronic diastolic CHF (congestive heart failure) ([facility] )    Coronary artery disease involving native coronary artery of native heart without angina pectoris    S/P CABG (coronary artery bypass graft)    PVD (peripheral vascular disease) ([facility])    Renal artery stenosis, native, bilateral ([facility] )    Aorto-iliac disease ([facility] )    Asymptomatic bilateral carotid artery stenosis    Benign hypertension with chronic kidney disease, stage III ([facility])    Mixed hyperlipidemia    Cigarette nicotine dependence with nicotine-induced disorder    Chronic heart failure with preserved ejection fraction ([facility])  -     torsemide (DEMADEX) 20 mg tablet; Take 1 tablet (20 mg total) by mouth 2 (two) times a day    Abnormal EKG  -     NM myocardial perfusion spect (rx stress and/or rest);  Future    Other orders  -     Multiple Vitamin (MULTIVITAMIN ADULT PO); Take by mouth  -     Cholecalciferol (VITAMIN D3 PO); Take by mouth        Past Medical History:   Diagnosis Date    CAD (coronary artery disease)     Carotid stenosis, asymptomatic, bilateral     Chronic kidney disease     Diabetes ([facility] )     type 2, non-insulin dependent    Diabetes mellitus ([facility] )     GERD (gastroesophageal reflux disease)     History of nephrolithiasis     Hyperlipidemia     Hypertension     PAD (peripheral artery disease) ([facility])     Severe aortic stenosis        Review of Systems   Constitutional: Negative for chills  Cardiovascular: Negative for chest pain, claudication, cyanosis, dyspnea on exertion, irregular heartbeat, leg swelling, near-syncope, orthopnea, palpitations, paroxysmal nocturnal dyspnea and syncope  Respiratory: Negative for cough and shortness of breath  Gastrointestinal: Negative for heartburn and nausea  Neurological: Positive for dizziness  Negative for focal weakness, headaches, light-headedness and weakness  All other systems reviewed and are negative  No Known Allergies        Current Outpatient Medications:     allopurinol (ZYLOPRIM) 100 mg tablet, Take 1 tablet (100 mg total) by mouth daily, Disp: 90 tablet, Rfl: 3    amLODIPine (NORVASC) 10 mg tablet, TAKE 1 TABLET DAILY, Disp: 90 tablet, Rfl: 3    aspirin (ECOTRIN LOW STRENGTH) 81 mg EC tablet, Take 81 mg by mouth daily, Disp: , Rfl:     atorvastatin (LIPITOR) 80 mg tablet, TAKE 1 TABLET DAILY AT     BEDTIME, Disp: 90 tablet, Rfl: 3    Cholecalciferol (VITAMIN D3 PO), Take by mouth, Disp: , Rfl:     clopidogrel (PLAVIX) 75 mg tablet, Take 1 tablet (75 mg total) by mouth in the morning , Disp: 90 tablet, Rfl: 3    Cyanocobalamin (VITAMIN B12 PO), Take by mouth once a week  , Disp: , Rfl:     lisinopril (ZESTRIL) 10 mg tablet, Take 1 tablet (10 mg total) by mouth daily, Disp: 30 tablet, Rfl: 1    Magnesium Oxide (MAG-200 PO), Take by mouth once a week , Disp: , Rfl:     metoprolol succinate (TOPROL-XL) 25 mg 24 hr tablet, Take 0 5 tablets (12 5 mg total) by mouth daily, Disp: 45 tablet, Rfl: 1    Multiple Vitamin (MULTIVITAMIN ADULT PO), Take by mouth, Disp: , Rfl:     pantoprazole (PROTONIX) 40 mg tablet, TAKE 1 TABLET DAILY, Disp: 90 tablet, Rfl: 1    potassium chloride (K-DUR,KLOR-CON) 20 mEq tablet, Take 1 tablet (20 mEq total) by mouth daily, Disp: 90 tablet, Rfl: 3    torsemide (DEMADEX) 20 mg tablet, Take 1 tablet (20 mg total) by mouth 2 (two) times a day, Disp: 180 tablet, Rfl: 3    acetaminophen (TYLENOL) 325 mg tablet, Take 3 tablets (975 mg total) by mouth every 8 (eight) hours (Patient not taking: Reported on 7/7/2022), Disp: , Rfl: 0    AMYLASE-LIPASE-PROTEASE PO, Take by mouth , Disp: , Rfl:     docusate sodium (COLACE) 100 mg capsule, Take 1 capsule (100 mg total) by mouth 2 (two) times a day (Patient not taking: Reported on 7/7/2022), Disp: , Rfl: 0    mupirocin (BACTROBAN) 2 % ointment, APPLY INTO EACH NOSTRIL TWICE DAILY (Patient not taking: Reported on 7/7/2022), Disp: , Rfl:     oxyCODONE (ROXICODONE) 5 immediate release tablet, Take 2 5 mg every 6 hours as needed for moderate pain or take 5 mg every 8 hours as needed for severe pain (Patient not taking: Reported on 7/7/2022), Disp: 30 tablet, Rfl: 0    polyethylene glycol (MIRALAX) 17 g packet, Take 17 g by mouth daily (Patient not taking: Reported on 7/7/2022), Disp: , Rfl: 0    Social History     Socioeconomic History    Marital status:      Spouse name: Not on file    Number of children: Not on file    Years of education: Not on file    Highest education level: Not on file   Occupational History    Not on file   Tobacco Use    Smoking status: Current Every Day Smoker     Packs/day: 0 25     Years: 50 00     Pack years: 12 50     Types: Cigarettes    Smokeless tobacco: Never Used    Tobacco comment: 3-4 cigarettes daily   Vaping Use    Vaping Use: Never used   Substance and Sexual Activity    Alcohol use: Yes     Comment: rare    Drug use: No    Sexual activity: Not on file   Other Topics Concern    Not on file   Social History Narrative    · Most recent tobacco use screenin2018      · Do you currently or have you served in the [employer]: Yes      · If Yes, What branch of service:   [employer]      · Occupation:   Dentists      · Exercise level:   Occasional        · Caffeine intake:   Heavy      · Marital status:         · Diet:   Regular  low fat     · Seat belts used routinely:   Yes      · Smoke alarm in home: Yes      · Advance directive: Yes      · General stress level:   Low      Social Determinants of Health     Financial Resource Strain: Not on file   Food Insecurity: No Food Insecurity    Worried About Running Out of Food in the Last Year: Never true    Noris of Food in the Last Year: Never true   Transportation Needs: No Transportation Needs    Lack of Transportation (Medical): No    Lack of Transportation (Non-Medical): No   Physical Activity: Not on file   Stress: Not on file   Social Connections: Not on file   Intimate Partner Violence: Not on file   Housing Stability: Low Risk     Unable to Pay for Housing in the Last Year: No    Number of Places Lived in the Last Year: 1    Unstable Housing in the Last Year: No       Family History   Problem Relation Age of Onset    Heart attack Father     Other Sister         bypass and vlave replacement    Stroke Paternal Uncle     Arrhythmia Neg Hx     Asthma Neg Hx     Clotting disorder Neg Hx     Fainting Neg Hx     Anuerysm Neg Hx     Hypertension Neg Hx         unsure     Hyperlipidemia Neg Hx     Heart failure Neg Hx        Physical Exam  Vitals and nursing note reviewed  Constitutional:       General: He is not in acute distress  HENT:      Head: Normocephalic and atraumatic     Eyes:      Conjunctiva/sclera: Conjunctivae normal    Cardiovascular:      Rate and Rhythm: Regular rhythm  Bradycardia present  Pulses: Intact distal pulses  Heart sounds: Murmur heard  Harsh midsystolic murmur is present with a grade of 3/6 at the upper right sternal border radiating to the neck  Pulmonary:      Effort: Pulmonary effort is normal       Breath sounds: Normal breath sounds  Abdominal:      General: Bowel sounds are normal       Palpations: Abdomen is soft  Musculoskeletal:         General: Normal range of motion  Cervical back: Normal range of motion and neck supple  Skin:     General: Skin is warm and dry  Neurological:      Mental Status: He is alert and oriented to person, place, and time  Vitals: Blood pressure (!) 162/45, pulse 55, height 5' 10" (1 778 m), weight 75 3 kg (166 lb), SpO2 98 %  Wt Readings from Last 3 Encounters:   22 75 3 kg (166 lb)   22 75 3 kg (166 lb)   22 75 3 kg (166 lb)         Labs & Results:  Lab Results   Component Value Date    WBC 5 10 2022    HGB 9 3 (L) 2022    HCT 30 0 (L) 2022    MCV 88 2022     2022     No results found for: BNP  No components found for: CHEM    Results for orders placed during the hospital encounter of 21    Echo complete with contrast if indicated    Narrative  [address]  [phone]    Transthoracic Echocardiogram  2D, M-mode, Doppler, and Color Doppler    Study date:  06-May-2021    Patient: Susan Bruce  MR number: [de-identified]  Account number: [de-identified]  : 1945  Age: 64 years  Gender: Male  Status: Outpatient  Location: [place] [address]   Height: 70 in  Weight: 181 lb  BP: 150/ 58 mmHg    Indications: Assess the aortic valve      Diagnoses: I35 0 - Nonrheumatic aortic (valve) stenosis    Sonographer:  Samanta Crespo RDCS  Primary Physician:  Sanchez Espinoza MD  Referring Physician:  Arjun Manley MD  Group:  [facility]  Interpreting Physician:  Karen Merida MD    SUMMARY    LEFT VENTRICLE:  Systolic function was normal  Ejection fraction was estimated to be 60 %  There were no regional wall motion abnormalities  Wall thickness was mildly increased  Doppler parameters were consistent with abnormal left ventricular relaxation (grade 1 diastolic dysfunction)  LEFT ATRIUM:  The atrium was mildly dilated  MITRAL VALVE:  There was mild regurgitation  AORTIC VALVE:  The valve was probably trileaflet  Leaflets exhibited moderately increased thickness and moderate calcification  There was severe stenosis  There was trace regurgitation  Valve peak gradient was 63 mmHg  Valve mean gradient was 32 mmHg  Aortic valve area was 1 cmï¾² by the continuity equation  Estimated aortic valve area (by VTI) was 0 91 cmï¾²  TRICUSPID VALVE:  There was trace regurgitation  HISTORY: PRIOR HISTORY: CAD, bradycardia, DM, dyslipidemia, PAD, CABG,    PROCEDURE: The study was performed in the [address]  This was a routine study  The transthoracic approach was used  The study included complete 2D imaging, M-mode, complete spectral Doppler, and color Doppler  The heart rate was  66 bpm, at the start of the study  Images were obtained from the parasternal, apical, subcostal, and suprasternal notch acoustic windows  Image quality was adequate  LEFT VENTRICLE: Size was normal  Systolic function was normal  Ejection fraction was estimated to be 60 %  There were no regional wall motion abnormalities  Wall thickness was mildly increased  DOPPLER: Doppler parameters were consistent  with abnormal left ventricular relaxation (grade 1 diastolic dysfunction)  RIGHT VENTRICLE: The size was normal  Systolic function was normal  Wall thickness was normal     LEFT ATRIUM: The atrium was mildly dilated  RIGHT ATRIUM: Size was normal     MITRAL VALVE: Valve structure was normal  There was normal leaflet separation   DOPPLER: The transmitral velocity was within the normal range  There was no evidence for stenosis  There was mild regurgitation  AORTIC VALVE: The valve was probably trileaflet  Leaflets exhibited moderately increased thickness and moderate calcification  DOPPLER: There was severe stenosis  There was trace regurgitation  TRICUSPID VALVE: The valve structure was normal  There was normal leaflet separation  DOPPLER: The transtricuspid velocity was within the normal range  There was no evidence for stenosis  There was trace regurgitation  PULMONIC VALVE: Leaflets exhibited normal thickness, no calcification, and normal cuspal separation  DOPPLER: The transpulmonic velocity was within the normal range  There was no significant regurgitation  PERICARDIUM: There was no pericardial effusion  The pericardium was normal in appearance  AORTA: The root exhibited normal size  SYSTEMIC VEINS: IVC: The inferior vena cava was normal in size      MEASUREMENT TABLES    2D MEASUREMENTS  LVOT   (Reference normals)  Diam   23 mm   (--)    DOPPLER MEASUREMENTS  LVOT   (Reference normals)  VTI   24 cm   (--)  R-R interval   1017 ms   (--)  HR   59 bpm   (--)  Stroke vol   99 71 ml   (--)  Cardiac output   5 88 L/min   (--)  Cardiac index   2 94 L/min/mï¾²   (--)  Aortic valve   (Reference normals)  VTI   108 cm   (--)  Peak gradient   63 mmHg   (--)  Mean gradient   32 mmHg   (--)  Valve area, cont   1 cmï¾²   (--)  Obstr index, VTI   0 22    (--)  Valve area, VTI   0 91 cmï¾²   (--)  Area index, VTI   0 46 cmï¾²/mï¾²   (--)    SYSTEM MEASUREMENT TABLES    2D  %FS: 23 52 %  Ao Diam: 3 06 cm  EDV(Teich): 137 67 ml  EF(Teich): 46 6 %  ESV(Teich): 73 51 ml  IVSd: 1 26 cm  LA Area: 24 18 cm2  LA Diam: 4 19 cm  LVEDV MOD A4C: 198 32 ml  LVEF MOD A4C: 54 08 %  LVESV MOD A4C: 91 07 ml  LVIDd: 5 34 cm  LVIDs: 4 08 cm  LVLd A4C: 10 15 cm  LVLs A4C: 8 98 cm  LVOT Diam: 2 43 cm  LVPWd: 1 18 cm  RA Area: 14 06 cm2  RVIDd: 3 75 cm  SV MOD A4C: 107 24 ml  SV(Teich): 64 16 ml    CW  AV Env  Ti: 414 84 ms  AV MaxP 54 mmHg  AV VTI: 103 91 cm  AV Vmax: 3 82 m/s  AV Vmean: 2 5 m/s  AV meanP 08 mmHg    MM  TAPSE: 2 13 cm    PW  LUIS (VTI): 1 06 cm2  LUIS Vmax: 1 04 cm2  E' Sept: 0 04 m/s  E/E' Sept: 22 83  LVOT Env  Ti: 482 08 ms  LVOT VTI: 23 7 cm  LVOT Vmax: 0 86 m/s  LVOT Vmean: 0 5 m/s  LVOT maxP 94 mmHg  LVOT meanP 25 mmHg  LVSV Dopp: 110 25 ml  MV A Pop: 1 23 m/s  MV Dec [place]: 5 05 m/s2  MV DecT: 180 67 ms  MV E Pop: 0 91 m/s  MV E/A Ratio: 0 74  MV PHT: 52 39 ms  MVA By PHT: 4 2 cm2    [facility] [facility]    Prepared and electronically signed by    Jose Rivera MD  Signed 06-May-2021 16:40:31    No results found for this or any previous visit  This note was completed in part utilizing m-modal fluency direct voice recognition software  Grammatical errors, random word insertion, spelling mistakes, and incomplete sentences may be an occasional consequence of the system secondary to software limitations, ambient noise and hardware issues  At the time of dictation, efforts were made to edit, clarify and /or correct errors  Please read the chart carefully and recognize, using context, where substitutions have occurred    If you have any questions or concerns about the context, text or information contained within the body of this dictation, please contact myself, the provider, for further clarification

## 2022-07-06 ENCOUNTER — HOME CARE VISIT (OUTPATIENT)
Dept: HOME HEALTH SERVICES | Facility: HOME HEALTHCARE | Age: 77
End: 2022-07-06
Payer: MEDICARE

## 2022-07-06 VITALS
DIASTOLIC BLOOD PRESSURE: 60 MMHG | OXYGEN SATURATION: 97 % | RESPIRATION RATE: 18 BRPM | HEART RATE: 58 BPM | SYSTOLIC BLOOD PRESSURE: 100 MMHG | TEMPERATURE: 97.2 F

## 2022-07-06 PROCEDURE — G0299 HHS/HOSPICE OF RN EA 15 MIN: HCPCS

## 2022-07-07 ENCOUNTER — OFFICE VISIT (OUTPATIENT)
Dept: CARDIOLOGY CLINIC | Facility: CLINIC | Age: 77
End: 2022-07-07
Payer: MEDICARE

## 2022-07-07 VITALS
WEIGHT: 166 LBS | HEART RATE: 55 BPM | DIASTOLIC BLOOD PRESSURE: 45 MMHG | SYSTOLIC BLOOD PRESSURE: 162 MMHG | BODY MASS INDEX: 23.77 KG/M2 | HEIGHT: 70 IN | OXYGEN SATURATION: 98 %

## 2022-07-07 DIAGNOSIS — I70.1 RENAL ARTERY STENOSIS, NATIVE, BILATERAL (HCC): Chronic | ICD-10-CM

## 2022-07-07 DIAGNOSIS — R94.31 ABNORMAL EKG: ICD-10-CM

## 2022-07-07 DIAGNOSIS — I25.10 CORONARY ARTERY DISEASE INVOLVING NATIVE CORONARY ARTERY OF NATIVE HEART WITHOUT ANGINA PECTORIS: ICD-10-CM

## 2022-07-07 DIAGNOSIS — I50.32 CHRONIC DIASTOLIC CHF (CONGESTIVE HEART FAILURE) (HCC): ICD-10-CM

## 2022-07-07 DIAGNOSIS — Z95.2 S/P TAVR (TRANSCATHETER AORTIC VALVE REPLACEMENT): ICD-10-CM

## 2022-07-07 DIAGNOSIS — Z09 HOSPITAL DISCHARGE FOLLOW-UP: Primary | ICD-10-CM

## 2022-07-07 DIAGNOSIS — Z95.1 S/P CABG (CORONARY ARTERY BYPASS GRAFT): Chronic | ICD-10-CM

## 2022-07-07 DIAGNOSIS — I65.23 ASYMPTOMATIC BILATERAL CAROTID ARTERY STENOSIS: Chronic | ICD-10-CM

## 2022-07-07 DIAGNOSIS — N18.30 BENIGN HYPERTENSION WITH CHRONIC KIDNEY DISEASE, STAGE III (HCC): ICD-10-CM

## 2022-07-07 DIAGNOSIS — I73.9 PVD (PERIPHERAL VASCULAR DISEASE) (HCC): ICD-10-CM

## 2022-07-07 DIAGNOSIS — I12.9 BENIGN HYPERTENSION WITH CHRONIC KIDNEY DISEASE, STAGE III (HCC): ICD-10-CM

## 2022-07-07 DIAGNOSIS — F17.219 CIGARETTE NICOTINE DEPENDENCE WITH NICOTINE-INDUCED DISORDER: ICD-10-CM

## 2022-07-07 DIAGNOSIS — E78.2 MIXED HYPERLIPIDEMIA: ICD-10-CM

## 2022-07-07 DIAGNOSIS — I50.32 CHRONIC HEART FAILURE WITH PRESERVED EJECTION FRACTION (HCC): ICD-10-CM

## 2022-07-07 DIAGNOSIS — I77.9 AORTO-ILIAC DISEASE (HCC): Chronic | ICD-10-CM

## 2022-07-07 PROCEDURE — 99215 OFFICE O/P EST HI 40 MIN: CPT | Performed by: NURSE PRACTITIONER

## 2022-07-07 PROCEDURE — 93000 ELECTROCARDIOGRAM COMPLETE: CPT | Performed by: NURSE PRACTITIONER

## 2022-07-07 RX ORDER — METOPROLOL SUCCINATE 25 MG/1
12.5 TABLET, EXTENDED RELEASE ORAL DAILY
Start: 2022-07-07 | End: 2022-07-07 | Stop reason: SDUPTHER

## 2022-07-07 RX ORDER — POTASSIUM CHLORIDE 20 MEQ/1
20 TABLET, EXTENDED RELEASE ORAL DAILY
Qty: 90 TABLET | Refills: 3 | Status: SHIPPED | OUTPATIENT
Start: 2022-07-07 | End: 2022-09-26 | Stop reason: SDUPTHER

## 2022-07-07 RX ORDER — TORSEMIDE 20 MG/1
20 TABLET ORAL 2 TIMES DAILY
Qty: 180 TABLET | Refills: 3
Start: 2022-07-07 | End: 2022-09-26 | Stop reason: SDUPTHER

## 2022-07-07 RX ORDER — METOPROLOL SUCCINATE 25 MG/1
12.5 TABLET, EXTENDED RELEASE ORAL DAILY
Qty: 45 TABLET | Refills: 1 | Status: SHIPPED | OUTPATIENT
Start: 2022-07-07 | End: 2022-09-26 | Stop reason: SDUPTHER

## 2022-07-07 NOTE — PATIENT INSTRUCTIONS
Cigarette Smoking and Your Health   AMBULATORY CARE:   Risks to your health if you smoke:  Nicotine and other chemicals found in tobacco and e-cigarettes can damage every cell in your body  Even if you are a light smoker, you have an increased risk for cancer, heart disease, and lung disease  If you are pregnant or have diabetes, smoking increases your risk for complications  Nicotine can affect an adolescent's developing brain  This can lead to trouble thinking, learning, or paying attention  Benefits to your health if you stop smoking: You decrease respiratory symptoms such as coughing, wheezing, and shortness of breath  You reduce your risk for cancers of the lung, mouth, throat, kidney, bladder, pancreas, stomach, and cervix  If you already have cancer, you increase the benefits of chemotherapy  You also reduce your risk for cancer returning or a second cancer from developing  You reduce your risk for heart disease, blood clots, heart attack, and stroke  You reduce your risk for lung infections, and diseases such as pneumonia, asthma, chronic bronchitis, and emphysema  Your circulation improves  More oxygen can be delivered to your body  If you have diabetes, you lower your risk for complications, such as kidney, artery, and eye diseases  You also lower your risk for nerve damage  Nerve damage can lead to amputations, poor vision, and blindness  You improve your body's ability to heal and to fight infections  An adolescent can help his or her brain and body develop in a healthy way  Talk to your adolescent about all the health risks of nicotine  If you can, start talking about nicotine when your child is younger than 12 years  This may make it easier for him or her not to start using nicotine as a teenager or adult  Explain to him or her that it is best never to start  It can be hard to try to quit later      Benefits to the health of others if you stop smoking:  Tobacco is harmful to nonsmokers who breathe in your secondhand smoke  The following are ways the health of others around you may improve when you stop smoking: You lower the risks for lung cancer and heart disease in nonsmoking adults  If you are pregnant, you lower the risk for miscarriage, early delivery, low birth weight, and stillbirth  You also lower your baby's risk for SIDS, obesity, developmental delay, and neurobehavioral problems, such as ADHD  If you have children, you lower their risk for ear infections, colds, pneumonia, bronchitis, and asthma  Follow up with your doctor as directed:  Write down your questions so you remember to ask them during your visits  For support and more information:   [employer]  [address] [facility],[address]  [address]  Phone: [facility] [address]  Phone: 3- 516 - 991-2740  Web Address: Spike El  [place]    Smokefree  gov  Phone: 3- 345 - 148-3701  Web Address: [employer] smoke[employer]  [address] [place] [address] 2022 Information is for End User's use only and may not be sold, redistributed or otherwise used for commercial purposes  All illustrations and images included in CareNotes® are the copyrighted property of A D A M , Inc  or [address] [place] [address] Phil   The above information is an  only  It is not intended as medical advice for individual conditions or treatments  Talk to your doctor, nurse or pharmacist before following any medical regimen to see if it is safe and effective for you

## 2022-07-07 NOTE — LETTER
2022     Montserrat Ramirez MD  [address] [facility] Male [address] [facility] [address]    Patient: Lovely Odonnell  YOB: 1945   Date of Visit: 2022       Dear Dr Tank Crystal: Thank you for referring Rito Khanna to me for evaluation  Below are my notes for this consultation  If you have questions, please do not hesitate to call me  I look forward to following your patient along with you  Sincerely,        ZHANNA Jordan        CC: MD Christos Robledo, [address] [place]  2022  2:22 PM  Sign when Signing Visit  Cardiology  Heart Failure   Follow Up Office Visit Note -     Lovely Odonnell    76 y o    male   MRN: [id number]  [address]  [address] [address] [phone]    PCP: Montserrat Ramirez MD  Cardiologist : Dr Artis Craft: Dr Leta Oscar              Summary of Recommendations  Low-sodium diet, Heart failure education as below  Smoking cessation imperative  Patient not interested in quitting  Lifelong SBE prophylaxis  Continue Toprol 12 5 mg daily as the patient has been doing in the last few days  I recommend torsemide 20 mg b i d  He has been taking it daily  This could help his blood pressure  Pharmacological nuclear stress test given EKG changes  He has follow-up echocardiogram scheduled at the end of the month  Follow-up with Dr Elisha Wnog in 6 weeks  Colon Ca screenin2021 , up-to-date      Impression/plan  Aortic stenosis, severe, S/P # 29 mm Smith Howie 3 TAVR adm -22  · EKG today:  Sinus bradycardia 55 beats per minute  T-wave inversions most pronounced anteriorly laterally  He does have some nonspecific changes inferiorly  There is no LVH noted on his last echocardiogram   Will pursue a nuclear stress test, an echocardiogram has been requested for the end the month  ·  Lifelong SBE prophylaxis  CAD   On aspirin, Plavix, statin, metoprolol succinate 12 5 mg b i d  beginning 6/18/22  The patient has been taking metoprolol 12 5 mg daily  · S/P  CABG x2 9/2013  · LHC 5/24/22: BURAK x 1 to pRCA, 10% residual stenosis, 50% mid RCA lesion medically managed; Patent LIMA to LAD, and patent SVG to OM(pre TAVR workup)  Chronic diastolic heart failure  Wt Readings from Last 3 Encounters:   07/07/22 75 3 kg (166 lb)   06/29/22 75 3 kg (166 lb)   06/28/22 75 3 kg (166 lb)     --beta-blocker:  historically none, given bradycardia and nocturnal pauses  He was started back on metoprolol succinate 12 5 mg b i d  after his TAVR  Reduce to daily  --Diuretic:   torsemide 20 mg b i d  potassium 20 mEq daily, magnesium oxide 200 mg daily once a week  His torsemide was reduced by me May 31st  --ACE/ARB/ARNI:   lisinopril 10 mg daily  In the past he was on benazepril which was stopped due to worsening CKD  Will check a BMP  --MRA:   --2 g sodium diet, 1800 cc fluid restriction  Daily weights, call weight gain 2-3 lb in 1 day or 5 lb in 5 days  Sinus bradycardia- while hospitalized on telemetry, in the past:  Episodes of  sinus bradycardia into the 40s-50s with PACs and at times competing junctional beats     Started back on low-dose beta-blocker after his TAVR  Is now reduced to daily   Hypertension, essential  /50  On amlodipine 10 mg daily, lisinopril 10 mg daily, loop diuretic, low-dose beta-blocker  Hyperlipidemia  On atorvastatin 80 mg/d   5/5/22 LDL 33 non HDL 43  CKD  Baseline creatinine 1 6-1 8  Creatinine since May has been around 2 3-2 4, Last creatinine 2 26  Follows with Nephrology Dr Priscila Souza  PVD  Followed by vascular surgery  · Bilateral carotid stenosis  · Status post left lower extremity revascularization  Iron deficiency anemia  6/10/22: hemoglobin 9 3 hematocrit 30  He is considering going back on oral iron  History ischemic colitis  Declines oral iron given constipation  Plan for repeat colonoscopy 3 years  Tobacco abuse ongoing  cessation imperative    He has cut down to about 4-5 cigarettes a day  Encouraged to cut back further with the aim of quitting completely  Cardiac testing  · TTE 5/6/21 EF 60%  No are WMA  Grade 1 DD  Mild left atrial enlargement  Mild MR  Severe aortic stenosis  Peak gradient 63 mean gradient 32  LUIS 1 cm2 by the continuity equation   TTE 3/24/22 EF 50%  Grade 2 DD  Mild LVH  Moderate hypokinesis of the basal to mid inferior wall  RV size and function normal   Mild left atrial enlargement  Mild AI, Severe aortic stenosis  Moderate MR   Holter monitor 4/5/22 occasional bradycardia with maximum of 2 7 second pauses   [facility] 5/5/22  Patent LIMA-LAD; Patent SVG-OM; Ungrafted proximal RCA with calcific lesion requiring PCI pre TAVR consideration        Due to CKD with creatinine 2 3-2 8 range (GFR <25) will need staged complex PCI with pre admission hydration night before   [facility] 5/24/22 PCI/BURAK proximal RCA lesion  Residual 10% stenosis  50% mid RCA lesion medically managed   TTE 6/15/22  EF 60%  Grade 1 DD  Mild LAE  There is an Smith KYLER 3 29 mm TAVR bioprosthetic valve  The prosthetic valve appears well-seated and appears to be functioning normally  Prosthetic valve leaflet motion is normal  There is no evidence of paravalvular regurgitation  There is no evidence of transvalvular regurgitation  The gradient recorded across the prosthetic aortic valve is within the expected range  The aortic valve peak velocity is 1 84 m/s  The aortic valve mean gradient is 8 mmHg  The DVI is 0 49  The aortic valve area is 2 04 cm2  Mitral Valve: There is mild annular calcification  No evidence of MR                HPI Salbador Seip  is a 76y o  year old male with CAD, AS, PVD, HTN, HL, CKD, ongoing tobacco abuse and chronic anemia  He underwent CABG x2 September 2013  His ejection fraction at that time was preserved  He has had left lower extremity revascularization and carotid disease, followed by vascular surgery    He has also had sinus bradycardia which has improved, with the discontinuation of beta-blockers  Due to progressive severe symptomatic AS he was referred to CT surgery for evaluation for TAVR  He was seen in the office September 2020 by Dr Dieter Maya  At that time the patient decided to pursue evaluation of neurological complaints prior to consideration for TAVR  He has followed with Dr Desiree Jay  He was last seen in the office 3/2/22  Encouraged follow-up with CT surgery  Home meds include amlodipine 10 mg daily, benazepril 40 mg daily, and hydralazine 50 mg t i d   Patient not on diuretics at home    Adm 3/22-3/25/22  CC:  Shortness of breath, DEMARCO times 1-2 weeks, lower extremity edema, orthopnea  HsTn : 674 --> 588 --> 727  ECG - NSR, LVh with repolarization abnormalities  CXR -vascular congestion and small right effusion, NT proBNP 34 000  Creatinine was 2 67, from baseline of 1 7-1 8  Diuresed with Lasix 80 IV b i d   Echo: EF 50, grade 2 DD  Severe AS  Discharge creatinine :  2 5   Discharge weight 167 lb   Discharge diuretic:  Torsemide 60 mg daily  Hydralazine and benazepril were discontinued      3/28/22   labs: Cr 2 85, BUN 58  K 4 3  Hemoglobin 8 1 hematocrit 27 4  H&H March 25th were 7 1/ 22 9      3/31/22  Hospital follow-up: acute diastolic heart failure secondary to severe aortic stenosis, CKD 4  EF 50  CTS eval 4/22  ROS:  Overall he tells me his breathing has improved  No chest pain  He is able to lie flat, no orthopnea  DEMARCO with steps  No CP  No lightheadedness or dizziness  He is quite aware of his labs, and concerned about his renal function   His weight is down  Wt Readings from Last 3 Encounters:   07/07/22 75 3 kg (166 lb)   06/29/22 75 3 kg (166 lb)   06/28/22 75 3 kg (166 lb)   Blood pressure 110/58    Heart rate today 59 and regular by exam  Social history:  I suspect some sodium dietary indiscretion in the past   We discussed the importance of a salt restricted diet today  We reviewed his most recent labs, concern is his renal function, and anemia, bradycardia while hospitalized  He is not taking the iron supplement because it is constipating   No melena or hematochezia  Today will decrease his torsemide to 20 mg b i d , reassess his labs in 1 week   In review his meds he is also still on metformin given his CKD, recommend he stop it and follow-up with his PCP as well as his nephrologist      Interval history  [facility] 80% RCA lesion  5/11/22 OV Dr Elisha Wong  + DEMARCO  Holter monitor demonstrated occasional bradycardia with maximum of 2 7 second pauses  Bradycardia, improved off beta-blockers  Thinking about TAVR  Issues with ischemic colitis, starting iron  Follow-up 3 months    Adm 5/23-5/25/22  [facility]: PCI / BURAK x 1 to pRCA, 10% residual stenosis; 50% mid RCA-medically managed; Patent LIMA to LAD, and patent SVG to OM  DAPT with aspirin and Plavix  Followed by Nephrology  Discharge creatinine 2 0    Adm 6/14-6/17/22  Elective TAVR  Postprocedure echo shows an EF of 60%, a well-seated aortic valve, no MR  Discharged on metoprolol succinate 12 5 mg b i d  for bigeminy with PVCs  Started on lisinopril 10 mg daily  Discharge creatinine 2 45  Discharge weight: 167 lb      6/28  Office visit Nephrology  Current meds: Amlodipine 10 mg daily, lisinopril 10 mg, Metoprolol 12 5 mg bid, Torsemide 20 mg bid   BP high in the office but acceptable at home no med changes  Recommend continuing lisinopril  Declined iron, given constipation    7/7/22  He is a 76y o  year old male with CAD, AS, PVD, HTN, HL, CKD, ongoing tobacco abuse and chronic anemia  He underwent CABG x2 September 2013  Found  To have worsening AS, referred to CT surgery  [facility] 5/24/22: BURAK x 1 to pRCA, 10% residual stenosis, 50% mid RCA lesion medically managed; Patent LIMA to LAD, and patent SVG to OM (pre TAVR workup)  TAVR 6/14/22  EF preserved  ROS: occ dizzy  He backed off on some of his meds- still symptomatic    C/O leg heaviness with walking  Denies chest pain, SOB  Mostly concerned about his dizziness  Currently on On Toprol 12 5 daily, torsemide 20/d-  he's been taking it daily the last few days given nocturia  K 20 meq/d  Mag ox 200 weekly  Last labs 6/20; cr 2 26  BUN 53  K 4 3  H/H  9 3/30  Wt stable 166 lb  BP up 162/45 by the automated machine by me  Left arm  EKG sinus bradycardia first-degree AV block 55 beats per minute deep T-wave inversions most pronounced anteriorly laterally  Nonspecific changes inferiorly  I reviewed his case in EKG briefly with Dr Garcia Orn was in the office today  Recommended stress test and echocardiogram   The echo has previously been scheduled  Will pursue a nuclear stress test   The patient is in agreement  For now, will continue Toprol 12 5 mg daily, encouraged torsemide 20 mg b i d  This may help his blood pressure is well        I have spent 40minutes with Patient  today in which greater than 50% of this time was spent in counseling/coordination of care regarding Intructions for management, Patient and family education, Importance of tx compliance and Risk factor reductions  Assessment  Diagnoses and all orders for this visit:    Hospital discharge follow-up    S/P TAVR (transcatheter aortic valve replacement)  -     POCT ECG  -     Discontinue: metoprolol succinate (TOPROL-XL) 25 mg 24 hr tablet; Take 0 5 tablets (12 5 mg total) by mouth daily  -     NM myocardial perfusion spect (rx stress and/or rest); Future  -     metoprolol succinate (TOPROL-XL) 25 mg 24 hr tablet; Take 0 5 tablets (12 5 mg total) by mouth daily  -     potassium chloride (K-DUR,KLOR-CON) 20 mEq tablet;  Take 1 tablet (20 mEq total) by mouth daily    Chronic diastolic CHF (congestive heart failure) ([facility] [address] )    Coronary artery disease involving native coronary artery of native heart without angina pectoris    S/P CABG (coronary artery bypass graft)    PVD (peripheral vascular disease) ([facility])    Renal artery stenosis, native, bilateral ([place] [address] )    Aorto-iliac disease ([place] [address] )    Asymptomatic bilateral carotid artery stenosis    Benign hypertension with chronic kidney disease, stage III ([facility])    Mixed hyperlipidemia    Cigarette nicotine dependence with nicotine-induced disorder    Chronic heart failure with preserved ejection fraction ([facility])  -     torsemide (DEMADEX) 20 mg tablet; Take 1 tablet (20 mg total) by mouth 2 (two) times a day    Abnormal EKG  -     NM myocardial perfusion spect (rx stress and/or rest); Future    Other orders  -     Multiple Vitamin (MULTIVITAMIN ADULT PO); Take by mouth  -     Cholecalciferol (VITAMIN D3 PO); Take by mouth        Past Medical History:   Diagnosis Date    CAD (coronary artery disease)     Carotid stenosis, asymptomatic, bilateral     Chronic kidney disease     Diabetes ([place] [address] )     type 2, non-insulin dependent    Diabetes mellitus ([place] [address] )     GERD (gastroesophageal reflux disease)     History of nephrolithiasis     Hyperlipidemia     Hypertension     PAD (peripheral artery disease) ([facility])     Severe aortic stenosis        Review of Systems   Constitutional: Negative for chills  Cardiovascular: Negative for chest pain, claudication, cyanosis, dyspnea on exertion, irregular heartbeat, leg swelling, near-syncope, orthopnea, palpitations, paroxysmal nocturnal dyspnea and syncope  Respiratory: Negative for cough and shortness of breath  Gastrointestinal: Negative for heartburn and nausea  Neurological: Positive for dizziness  Negative for focal weakness, headaches, light-headedness and weakness  All other systems reviewed and are negative  No Known Allergies        Current Outpatient Medications:     allopurinol (ZYLOPRIM) 100 mg tablet, Take 1 tablet (100 mg total) by mouth daily, Disp: 90 tablet, Rfl: 3    amLODIPine (NORVASC) 10 mg tablet, TAKE 1 TABLET DAILY, Disp: 90 tablet, Rfl: 3    aspirin (ECOTRIN LOW STRENGTH) 81 mg EC tablet, Take 81 mg by mouth daily, Disp: , Rfl:     atorvastatin (LIPITOR) 80 mg tablet, TAKE 1 TABLET DAILY AT     BEDTIME, Disp: 90 tablet, Rfl: 3    Cholecalciferol (VITAMIN D3 PO), Take by mouth, Disp: , Rfl:     clopidogrel (PLAVIX) 75 mg tablet, Take 1 tablet (75 mg total) by mouth in the morning , Disp: 90 tablet, Rfl: 3    Cyanocobalamin (VITAMIN B12 PO), Take by mouth once a week  , Disp: , Rfl:     lisinopril (ZESTRIL) 10 mg tablet, Take 1 tablet (10 mg total) by mouth daily, Disp: 30 tablet, Rfl: 1    Magnesium Oxide (MAG-200 PO), Take by mouth once a week , Disp: , Rfl:     metoprolol succinate (TOPROL-XL) 25 mg 24 hr tablet, Take 0 5 tablets (12 5 mg total) by mouth daily, Disp: 45 tablet, Rfl: 1    Multiple Vitamin (MULTIVITAMIN ADULT PO), Take by mouth, Disp: , Rfl:     pantoprazole (PROTONIX) 40 mg tablet, TAKE 1 TABLET DAILY, Disp: 90 tablet, Rfl: 1    potassium chloride (K-DUR,KLOR-CON) 20 mEq tablet, Take 1 tablet (20 mEq total) by mouth daily, Disp: 90 tablet, Rfl: 3    torsemide (DEMADEX) 20 mg tablet, Take 1 tablet (20 mg total) by mouth 2 (two) times a day, Disp: 180 tablet, Rfl: 3    acetaminophen (TYLENOL) 325 mg tablet, Take 3 tablets (975 mg total) by mouth every 8 (eight) hours (Patient not taking: Reported on 7/7/2022), Disp: , Rfl: 0    AMYLASE-LIPASE-PROTEASE PO, Take by mouth , Disp: , Rfl:     docusate sodium (COLACE) 100 mg capsule, Take 1 capsule (100 mg total) by mouth 2 (two) times a day (Patient not taking: Reported on 7/7/2022), Disp: , Rfl: 0    mupirocin (BACTROBAN) 2 % ointment, APPLY INTO EACH NOSTRIL TWICE DAILY (Patient not taking: Reported on 7/7/2022), Disp: , Rfl:     oxyCODONE (ROXICODONE) 5 immediate release tablet, Take 2 5 mg every 6 hours as needed for moderate pain or take 5 mg every 8 hours as needed for severe pain (Patient not taking: Reported on 7/7/2022), Disp: 30 tablet, Rfl: 0    polyethylene glycol (MIRALAX) 17 g packet, Take 17 g by mouth daily (Patient not taking: Reported on 2022), Disp: , Rfl: 0    Social History     Socioeconomic History    Marital status:      Spouse name: Not on file    Number of children: Not on file    Years of education: Not on file    Highest education level: Not on file   Occupational History    Not on file   Tobacco Use    Smoking status: Current Every Day Smoker     Packs/day: 0 25     Years: 50 00     Pack years: 12 50     Types: Cigarettes    Smokeless tobacco: Never Used    Tobacco comment: 3-4 cigarettes daily   Vaping Use    Vaping Use: Never used   Substance and Sexual Activity    Alcohol use: Yes     Comment: rare    Drug use: No    Sexual activity: Not on file   Other Topics Concern    Not on file   Social History Narrative    · Most recent tobacco use screenin2018      · Do you currently or have you served in [employer]: Yes      · If Yes, What branch of service:   [employer]      · Occupation:   Dentists      · Exercise level:   Occasional        · Caffeine intake:   Heavy      · Marital status:         · Diet:   Regular  low fat     · Seat belts used routinely:   Yes      · Smoke alarm in home: Yes      · Advance directive: Yes      · General stress level:   Low      Social Determinants of Health     Financial Resource Strain: Not on file   Food Insecurity: No Food Insecurity    Worried About Running Out of Food in the Last Year: Never true    Noris of Food in the Last Year: Never true   Transportation Needs: No Transportation Needs    Lack of Transportation (Medical): No    Lack of Transportation (Non-Medical):  No   Physical Activity: Not on file   Stress: Not on file   Social Connections: Not on file   Intimate Partner Violence: Not on file   Housing Stability: Low Risk     Unable to Pay for Housing in the Last Year: No    Number of Places Lived in the Last Year: 1    Unstable Housing in the Last Year: No       Family History   Problem Relation Age of Onset    Heart attack Father    [facility] Other Sister         bypass and vlave replacement    Stroke Paternal Uncle     Arrhythmia Neg Hx     Asthma Neg Hx     Clotting disorder Neg Hx     Fainting Neg Hx     Anuerysm Neg Hx     Hypertension Neg Hx         unsure     Hyperlipidemia Neg Hx     Heart failure Neg Hx        Physical Exam  Vitals and nursing note reviewed  Constitutional:       General: He is not in acute distress  HENT:      Head: Normocephalic and atraumatic  Eyes:      Conjunctiva/sclera: Conjunctivae normal    Cardiovascular:      Rate and Rhythm: Regular rhythm  Bradycardia present  Pulses: Intact distal pulses  Heart sounds: Murmur heard  Harsh midsystolic murmur is present with a grade of 3/6 at the upper right sternal border radiating to the neck  Pulmonary:      Effort: Pulmonary effort is normal       Breath sounds: Normal breath sounds  Abdominal:      General: Bowel sounds are normal       Palpations: Abdomen is soft  Musculoskeletal:         General: Normal range of motion  Cervical back: Normal range of motion and neck supple  Skin:     General: Skin is warm and dry  Neurological:      Mental Status: He is alert and oriented to person, place, and time  Vitals: Blood pressure (!) 162/45, pulse 55, height 5' 10" (1 778 m), weight 75 3 kg (166 lb), SpO2 98 %     Wt Readings from Last 3 Encounters:   07/07/22 75 3 kg (166 lb)   06/29/22 75 3 kg (166 lb)   06/28/22 75 3 kg (166 lb)         Labs & Results:  Lab Results   Component Value Date    WBC 5 10 06/20/2022    HGB 9 3 (L) 06/20/2022    HCT 30 0 (L) 06/20/2022    MCV 88 06/20/2022     06/20/2022     No results found for: BNP  No components found for: CHEM    Results for orders placed during the hospital encounter of 05/06/21    Echo complete with contrast if indicated    Narrative  [facility], [address] [facility]  [phone]    Transthoracic Echocardiogram  2D, M-mode, Doppler, and Color Doppler    Study date:  06-May-2021    Patient: Stacey Saini  MR number: [de-identified]  Account number: [de-identified]  : 1945  Age: 64 years  Gender: Male  Status: Outpatient  Location: [place] [address]   Height: 70 in  Weight: 181 lb  BP: 150/ 58 mmHg    Indications: Assess the aortic valve  Diagnoses: I35 0 - Nonrheumatic aortic (valve) stenosis    Sonographer:  Caty Polo RDCS  Primary Physician:  Brenda Bearden MD  Referring Physician:  Angie Perez MD  Group:  [facility]  Interpreting Physician:  Lucille French MD    SUMMARY    LEFT VENTRICLE:  Systolic function was normal  Ejection fraction was estimated to be 60 %  There were no regional wall motion abnormalities  Wall thickness was mildly increased  Doppler parameters were consistent with abnormal left ventricular relaxation (grade 1 diastolic dysfunction)  LEFT ATRIUM:  The atrium was mildly dilated  MITRAL VALVE:  There was mild regurgitation  AORTIC VALVE:  The valve was probably trileaflet  Leaflets exhibited moderately increased thickness and moderate calcification  There was severe stenosis  There was trace regurgitation  Valve peak gradient was 63 mmHg  Valve mean gradient was 32 mmHg  Aortic valve area was 1 cmï¾² by the continuity equation  Estimated aortic valve area (by VTI) was 0 91 cmï¾²  TRICUSPID VALVE:  There was trace regurgitation  HISTORY: PRIOR HISTORY: CAD, bradycardia, DM, dyslipidemia, PAD, CABG,    PROCEDURE: The study was performed in the [address]  This was a routine study  The transthoracic approach was used  The study included complete 2D imaging, M-mode, complete spectral Doppler, and color Doppler  The heart rate was  66 bpm, at the start of the study  Images were obtained from the parasternal, apical, subcostal, and suprasternal notch acoustic windows  Image quality was adequate      LEFT VENTRICLE: Size was normal  Systolic function was normal  Ejection fraction was estimated to be 60 %  There were no regional wall motion abnormalities  Wall thickness was mildly increased  DOPPLER: Doppler parameters were consistent  with abnormal left ventricular relaxation (grade 1 diastolic dysfunction)  RIGHT VENTRICLE: The size was normal  Systolic function was normal  Wall thickness was normal     LEFT ATRIUM: The atrium was mildly dilated  RIGHT ATRIUM: Size was normal     MITRAL VALVE: Valve structure was normal  There was normal leaflet separation  DOPPLER: The transmitral velocity was within the normal range  There was no evidence for stenosis  There was mild regurgitation  AORTIC VALVE: The valve was probably trileaflet  Leaflets exhibited moderately increased thickness and moderate calcification  DOPPLER: There was severe stenosis  There was trace regurgitation  TRICUSPID VALVE: The valve structure was normal  There was normal leaflet separation  DOPPLER: The transtricuspid velocity was within the normal range  There was no evidence for stenosis  There was trace regurgitation  PULMONIC VALVE: Leaflets exhibited normal thickness, no calcification, and normal cuspal separation  DOPPLER: The transpulmonic velocity was within the normal range  There was no significant regurgitation  PERICARDIUM: There was no pericardial effusion  The pericardium was normal in appearance  AORTA: The root exhibited normal size  SYSTEMIC VEINS: IVC: The inferior vena cava was normal in size      MEASUREMENT TABLES    2D MEASUREMENTS  LVOT   (Reference normals)  Diam   23 mm   (--)    DOPPLER MEASUREMENTS  LVOT   (Reference normals)  VTI   24 cm   (--)  R-R interval   1017 ms   (--)  HR   59 bpm   (--)  Stroke vol   99 71 ml   (--)  Cardiac output   5 88 L/min   (--)  Cardiac index   2 94 L/min/mï¾²   (--)  Aortic valve   (Reference normals)  VTI   108 cm   (--)  Peak gradient   63 mmHg   (--)  Mean gradient   32 mmHg   (--)  Valve area, cont   1 cmï¾² (--)  Obstr index, VTI   0 22    (--)  Valve area, VTI   0 91 cmï¾²   (--)  Area index, VTI   0 46 cmï¾²/mï¾²   (--)    SYSTEM MEASUREMENT TABLES    2D  %FS: 23 52 %  Ao Diam: 3 06 cm  EDV(Teich): 137 67 ml  EF(Teich): 46 6 %  ESV(Teich): 73 51 ml  IVSd: 1 26 cm  LA Area: 24 18 cm2  LA Diam: 4 19 cm  LVEDV MOD A4C: 198 32 ml  LVEF MOD A4C: 54 08 %  LVESV MOD A4C: 91 07 ml  LVIDd: 5 34 cm  LVIDs: 4 08 cm  LVLd A4C: 10 15 cm  LVLs A4C: 8 98 cm  LVOT Diam: 2 43 cm  LVPWd: 1 18 cm  RA Area: 14 06 cm2  RVIDd: 3 75 cm  SV MOD A4C: 107 24 ml  SV(Teich): 64 16 ml    CW  AV Env  Ti: 414 84 ms  AV MaxP 54 mmHg  AV VTI: 103 91 cm  AV Vmax: 3 82 m/s  AV Vmean: 2 5 m/s  AV meanP 08 mmHg    MM  TAPSE: 2 13 cm    PW  LUIS (VTI): 1 06 cm2  LUIS Vmax: 1 04 cm2  E' Sept: 0 04 m/s  E/E' Sept: 22 83  LVOT Env  Ti: 482 08 ms  LVOT VTI: 23 7 cm  LVOT Vmax: 0 86 m/s  LVOT Vmean: 0 5 m/s  LVOT maxP 94 mmHg  LVOT meanP 25 mmHg  LVSV Dopp: 110 25 ml  MV A Pop: 1 23 m/s  MV Dec [place]: 5 05 m/s2  MV DecT: 180 67 ms  MV E Pop: 0 91 m/s  MV E/A Ratio: 0 74  MV PHT: 52 39 ms  MVA By PHT: 4 2 cm2    [employer] Accredited Echocardiography [facility]    Prepared and electronically signed by    Dom Souza MD  Signed 06-May-2021 16:40:31    No results found for this or any previous visit  This note was completed in part utilizing Tirendo direct voice recognition software  Grammatical errors, random word insertion, spelling mistakes, and incomplete sentences may be an occasional consequence of the system secondary to software limitations, ambient noise and hardware issues  At the time of dictation, efforts were made to edit, clarify and /or correct errors  Please read the chart carefully and recognize, using context, where substitutions have occurred    If you have any questions or concerns about the context, text or information contained within the body of this dictation, please contact myself, the provider, for further clarification

## 2022-07-07 NOTE — CASE COMMUNICATION
Patient discharged from home health nursing services on 7 6  Denied any concerns and further visits refused by patient

## 2022-07-18 DIAGNOSIS — Z95.2 S/P TAVR (TRANSCATHETER AORTIC VALVE REPLACEMENT): ICD-10-CM

## 2022-07-18 RX ORDER — LISINOPRIL 10 MG/1
10 TABLET ORAL DAILY
Qty: 90 TABLET | Refills: 3 | Status: SHIPPED | OUTPATIENT
Start: 2022-07-18

## 2022-07-20 ENCOUNTER — OFFICE VISIT (OUTPATIENT)
Dept: CARDIAC SURGERY | Facility: CLINIC | Age: 77
End: 2022-07-20
Payer: MEDICARE

## 2022-07-20 ENCOUNTER — HOSPITAL ENCOUNTER (OUTPATIENT)
Dept: NON INVASIVE DIAGNOSTICS | Facility: HOSPITAL | Age: 77
Discharge: HOME | End: 2022-07-20
Payer: MEDICARE

## 2022-07-20 VITALS
RESPIRATION RATE: 18 BRPM | DIASTOLIC BLOOD PRESSURE: 59 MMHG | OXYGEN SATURATION: 100 % | HEIGHT: 70 IN | TEMPERATURE: 96.7 F | SYSTOLIC BLOOD PRESSURE: 141 MMHG | BODY MASS INDEX: 23.48 KG/M2 | WEIGHT: 164 LBS | HEART RATE: 55 BPM

## 2022-07-20 VITALS
HEIGHT: 70 IN | WEIGHT: 166 LBS | HEART RATE: 45 BPM | BODY MASS INDEX: 23.77 KG/M2 | SYSTOLIC BLOOD PRESSURE: 162 MMHG | DIASTOLIC BLOOD PRESSURE: 45 MMHG

## 2022-07-20 DIAGNOSIS — Z48.89 ENCOUNTER FOR POSTOPERATIVE CARE: ICD-10-CM

## 2022-07-20 DIAGNOSIS — Z95.2 S/P TAVR (TRANSCATHETER AORTIC VALVE REPLACEMENT): Primary | ICD-10-CM

## 2022-07-20 DIAGNOSIS — I35.0 AORTIC STENOSIS, SEVERE: ICD-10-CM

## 2022-07-20 DIAGNOSIS — Z95.2 S/P TAVR (TRANSCATHETER AORTIC VALVE REPLACEMENT): ICD-10-CM

## 2022-07-20 LAB
AORTIC VALVE MEAN VELOCITY: 13.5 M/S
APICAL FOUR CHAMBER EJECTION FRACTION: 67 %
ATRIAL RATE: 53 BPM
AV AREA BY CONTINUOUS VTI: 1.8 CM2
AV AREA PEAK VELOCITY: 1.5 CM2
AV LVOT MEAN GRADIENT: 1 MMHG
AV LVOT PEAK GRADIENT: 2 MMHG
AV MEAN GRADIENT: 8 MMHG
AV PEAK GRADIENT: 12 MMHG
AV VALVE AREA: 1.75 CM2
AV VELOCITY RATIO: 0.36
DOP CALC AO PEAK VEL: 1.76 M/S
DOP CALC AO VTI: 47.4 CM
DOP CALC LVOT AREA: 4.15 CM2
DOP CALC LVOT DIAMETER: 2.3 CM
DOP CALC LVOT PEAK VEL VTI: 19.98 CM
DOP CALC LVOT PEAK VEL: 0.63 M/S
DOP CALC LVOT STROKE INDEX: 42 ML/M2
DOP CALC LVOT STROKE VOLUME: 82.97 CM3
DOP CALC MV VTI: 42.34 CM
E WAVE DECELERATION TIME: 296 MS
FRACTIONAL SHORTENING: 32 % (ref 28–44)
INTERVENTRICULAR SEPTUM IN DIASTOLE (PARASTERNAL SHORT AXIS VIEW): 1.4 CM
INTERVENTRICULAR SEPTUM: 1.4 CM (ref 0.6–1.1)
LAAS-AP2: 22.2 CM2
LAAS-AP4: 21.6 CM2
LEFT ATRIUM AREA SYSTOLE SINGLE PLANE A4C: 22.5 CM2
LEFT INTERNAL DIMENSION IN SYSTOLE: 3.6 CM (ref 2.1–4)
LEFT VENTRICULAR INTERNAL DIMENSION IN DIASTOLE: 5.3 CM (ref 3.5–6)
LEFT VENTRICULAR POSTERIOR WALL IN END DIASTOLE: 1.3 CM
LEFT VENTRICULAR STROKE VOLUME: 80 ML
LVSV (TEICH): 80 ML
MV E'TISSUE VEL-LAT: 7 CM/S
MV E'TISSUE VEL-SEP: 5 CM/S
MV MEAN GRADIENT: 2 MMHG
MV PEAK A VEL: 0.99 M/S
MV PEAK E VEL: 69 CM/S
MV PEAK GRADIENT: 4 MMHG
MV STENOSIS PRESSURE HALF TIME: 86 MS
MV VALVE AREA BY CONTINUITY EQUATION: 1.96 CM2
MV VALVE AREA P 1/2 METHOD: 2.56 CM2
P AXIS: 67 DEGREES
PR INTERVAL: 238 MS
QRS AXIS: 52 DEGREES
QRSD INTERVAL: 110 MS
QT INTERVAL: 536 MS
QTC INTERVAL: 469 MS
RIGHT ATRIUM AREA SYSTOLE A4C: 18.9 CM2
RIGHT VENTRICLE ID DIMENSION: 5 CM
SL CV LEFT ATRIUM LENGTH A2C: 5.9 CM
SL CV LV EF: 65
SL CV PED ECHO LEFT VENTRICLE DIASTOLIC VOLUME (MOD BIPLANE) 2D: 133 ML
SL CV PED ECHO LEFT VENTRICLE SYSTOLIC VOLUME (MOD BIPLANE) 2D: 53 ML
T WAVE AXIS: 159 DEGREES
TR MAX PG: 17 MMHG
TR PEAK VELOCITY: 2.1 M/S
TRICUSPID VALVE PEAK REGURGITATION VELOCITY: 2.08 M/S
VENTRICULAR RATE: 46 BPM

## 2022-07-20 PROCEDURE — 93010 ELECTROCARDIOGRAM REPORT: CPT | Performed by: INTERNAL MEDICINE

## 2022-07-20 PROCEDURE — 93306 TTE W/DOPPLER COMPLETE: CPT | Performed by: INTERNAL MEDICINE

## 2022-07-20 PROCEDURE — 93005 ELECTROCARDIOGRAM TRACING: CPT | Performed by: THORACIC SURGERY (CARDIOTHORACIC VASCULAR SURGERY)

## 2022-07-20 PROCEDURE — 99214 OFFICE O/P EST MOD 30 MIN: CPT | Performed by: NURSE PRACTITIONER

## 2022-07-20 PROCEDURE — 93306 TTE W/DOPPLER COMPLETE: CPT

## 2022-07-20 NOTE — PROGRESS NOTES
POST OP FOLLOW UP VISIT S/P TAVR    Procedure: S/P Transcatheter aortic valve replacement with a 29 mm Smith KYLER 3 bioprosthetic valve via transapical approach, performed on 6/14/22  History: Nena Bradford  is a 76y o  year old male who presents to our office today for routine follow up care from transapical transcatheter aortic valve replacement  Patient's PMHx is notable for CAD s/p CABG x 2 (9/2013) s/p PCI/BURAK pRCA (5/24/22), HTN, HLD, B/L Carotid stenosis (70%+), PAD/aorto-iliac disease w/claudication s/p multiple LE revascularization procedures, CKD4 (GFR 20), DM2, anemia, and colitis (h/o colectomy)  Post op rhythm: NSR w/ 1st degree AVB and new RBBB, ST depression and wide complex  Patient evaluated by Cardiology, cleared to resume low dose BB  Creatinine remained stable at baseline (CKD IV)  Patient discharged to home on POD # 3  Patient has had out patient f/u with PCP, Nephrology and Cardiology  ECG at Cardiology appt w/ T-Wave inversions  NM myocardial Perf study ordered  Metoprolol adjusted due to c/o dizziness  Today patient reports some improvement the dizziness but still present  He denies chest pain, SOB, fluid retention, palpitations  He states he has been able to perform activities such as mow the lawn and work in his garden  He reports he is unable to walk long distances due chronic claudication  He admits to continued tobacco abuse, 3 cigarettes per day  He denies issues with his surgical sites  Review of System:     History obtained from chart review and the patient  General ROS: negative  Psychological ROS: negative  Ophthalmic ROS: negative  ENT ROS: negative  Allergy and Immunology ROS: negative  Hematological and Lymphatic ROS: negative  Endocrine ROS: negative  Breast ROS: negative  Respiratory ROS: no cough, shortness of breath, or wheezing  Cardiovascular ROS: chronic LE claudication    no chest pain or dyspnea on exertion  Gastrointestinal ROS: no abdominal pain, change in bowel habits, or black or bloody stools  Genito-Urinary ROS: no dysuria, trouble voiding, or hematuria  Musculoskeletal ROS: negative  Neurological ROS: positive for - dizziness  negative for - gait disturbance, impaired coordination/balance, memory loss, numbness/tingling, seizures, speech problems, tremors or visual changes  Dermatological ROS: negative    Vital Signs:     Vitals:    07/20/22 1256   BP: 141/59   BP Location: Left arm   Patient Position: Sitting   Cuff Size: Standard   Pulse: 55   Resp: 18   Temp: (!) 96 7 °F (35 9 °C)   TempSrc: Tympanic   SpO2: 100%   Weight: 74 4 kg (164 lb)   Height: 5' 10" (1 778 m)       Home Medications:     Prior to Admission medications    Medication Sig Start Date End Date Taking?  Authorizing Provider   acetaminophen (TYLENOL) 325 mg tablet Take 3 tablets (975 mg total) by mouth every 8 (eight) hours  Patient not taking: Reported on 7/7/2022 6/17/22   Francisco Javier Darden PA-C   allopurinol (ZYLOPRIM) 100 mg tablet Take 1 tablet (100 mg total) by mouth daily 5/27/22   Mariely Collins MD   amLODIPine (NORVASC) 10 mg tablet TAKE 1 TABLET DAILY 12/13/21   Makenna Miller MD   AMYLASE-LIPASE-PROTEASE PO Take by mouth     Historical Provider, MD   aspirin (ECOTRIN LOW STRENGTH) 81 mg EC tablet Take 81 mg by mouth daily    Historical Provider, MD   atorvastatin (LIPITOR) 80 mg tablet TAKE 1 TABLET DAILY AT     BEDTIME 2/23/22   Makenna Miller MD   Cholecalciferol (VITAMIN D3 PO) Take by mouth    Historical Provider, MD   clopidogrel (PLAVIX) 75 mg tablet Take 1 tablet (75 mg total) by mouth in the morning  5/25/22   ZHANNA Blanton   Cyanocobalamin (VITAMIN B12 PO) Take by mouth once a week      Historical Provider, MD   docusate sodium (COLACE) 100 mg capsule Take 1 capsule (100 mg total) by mouth 2 (two) times a day  Patient not taking: Reported on 7/7/2022 6/17/22   Francisco Javier Darden PA-C   lisinopril (ZESTRIL) 10 mg tablet Take 1 tablet (10 mg total) by mouth daily 7/18/22   Mariluz Mckeon MD   Magnesium Oxide (MAG-200 PO) Take by mouth once a week     Historical Provider, MD   metoprolol succinate (TOPROL-XL) 25 mg 24 hr tablet Take 0 5 tablets (12 5 mg total) by mouth daily 7/7/22   ZHANNA Anderson   Multiple Vitamin (MULTIVITAMIN ADULT PO) Take by mouth    Historical Provider, MD   mupirocin (BACTROBAN) 2 % ointment APPLY INTO EACH NOSTRIL TWICE DAILY  Patient not taking: Reported on 7/7/2022 6/1/22   Historical Provider, MD   oxyCODONE (ROXICODONE) 5 immediate release tablet Take 2 5 mg every 6 hours as needed for moderate pain or take 5 mg every 8 hours as needed for severe pain  Patient not taking: Reported on 7/7/2022 6/17/22   Blaze Espinosa PA-C   pantoprazole (PROTONIX) 40 mg tablet TAKE 1 TABLET DAILY 6/11/22   Temo Rodgers PA-C   polyethylene glycol (MIRALAX) 17 g packet Take 17 g by mouth daily  Patient not taking: Reported on 7/7/2022 6/18/22   Blaze Espinosa PA-C   potassium chloride (K-DUR,KLOR-CON) 20 mEq tablet Take 1 tablet (20 mEq total) by mouth daily 7/7/22   ZHANNA Anderson   torsemide (DEMADEX) 20 mg tablet Take 1 tablet (20 mg total) by mouth 2 (two) times a day 7/7/22   ZHANNA Anderson       Physical Exam:    General: Alert, oriented, no acute distress  HEENT/NECK:  PERRLA  No jugular venous distention  Cardiac:Regular rate and rhythm, no murmurs rubs or gallops  Pulmonary:Breath sounds clear bilaterally  Abdomen:  Non-tender, Non-distended  Positive bowel sounds  Upper extremities: 2+ radial pulses; brisk capillary refill  Lower extremities: Extremities warm/dry  R femoral pulse 1+, no hematoma  PT/DP pulses 0 bilaterally  Trace edema B/L  Incisions: Left anterior chest wall apical approach incision healed  CT site with scab  Musculoskeletal: MAEE, stable gait  Neuro: Alert and oriented X 3  Sensation is grossly intact  No focal deficits  Skin: Warm/Dry, without rashes or lesions      Lab Results:   Lab Results   Component Value Date    SODIUM 140 06/20/2022    K 4 3 06/20/2022     06/20/2022    CO2 27 06/20/2022    BUN 53 (H) 06/20/2022    CREATININE 2 26 (H) 06/20/2022    GLUC 136 06/17/2022    CALCIUM 9 3 06/20/2022       Imaging Studies:     Transthoracic Echocardiogram: today    Left Ventricle Left ventricular cavity size is normal  Wall thickness is moderately increased  There is severe concentric hypertrophy  The left ventricular ejection fraction is 65%  Systolic function is normal   Wall motion is normal  Diastolic function is mildly abnormal, consistent with grade I (abnormal) relaxation  Right Ventricle Right ventricular cavity size is mildly dilated  Systolic function is normal    Left Atrium The atrium is mildly dilated  Right Atrium The atrium is mildly dilated  Aortic Valve There is an Smith KYLER 3 Ultra 29 mm TAVR bioprosthetic valve  The prosthetic valve appears well-seated  There is trace paravalvular regurgitation  The gradient recorded across the prosthetic aortic valve is within the expected range  The aortic valve peak velocity is 1 76 m/s  The aortic valve mean gradient is 8 mmHg  The DVI is 0 36  The aortic valve area is 1 75 cm2  Mitral Valve There is mild diffuse calcification limited to the leaflet base  The leaflets exhibit normal mobility  There is moderate annular calcification  There is mild regurgitation  There is no evidence of stenosis  Tricuspid Valve Tricuspid valve structure is normal  There is no evidence of regurgitation  There is no evidence of stenosis  The right ventricular systolic pressure is normal    Pulmonic Valve The pulmonic valve was not well visualized  There is no evidence of regurgitation  There is no evidence of stenosis  Ascending Aorta The aorta was not well visualized  IVC/SVC The inferior vena cava is dilated  Respirophasic changes were blunted (less than 50% variation)  Pericardium There is no pericardial effusion  The pericardium is normal in appearance       Left Ventricle Measurements    Function/Volumes   A4C EF 67 %         LVOT stroke volume 82 97 cm3         LVOT SI 42 ml/m2         Dimensions   LVIDd 5 3 cm         LVIDS 3 6 cm         IVSd 1 4 cm         LVPWd 1 3 cm         LVOT area 4 15 cm2         FS 32 %         Diastolic Filling   MV E' Tissue Velocity Septal 5 cm/s         MV E' Tissue Velocity Lateral 7 cm/s         E wave deceleration time 296 ms         MV Peak E Pop 69 cm/s         MV Peak A Pop 0 99 m/s          Report Measurements   AV LVOT peak gradient 2 mmHg              Interventricular Septum Measurements    Shunt Ratio   LVOT peak VTI 19 98 cm         LVOT peak pop 0 63 m/s              Right Ventricle Measurements    Dimensions   RVID d 5 cm               Left Atrium Measurements    Dimensions   LA length (A2C) 5 9 cm         EDD A4C 22 5 cm2               Right Atrium Measurements    Dimensions   RAA A4C 18 9 cm2               Atrial Septum Measurements    Shunt Ratio   LVOT peak VTI 19 98 cm         LVOT peak pop 0 63 m/s               Aortic Valve Measurements    Stenosis   Ao peak pop retrograde 1 76 m/s         LVOT peak pop 0 63 m/s         Ao VTI 47 4 cm         LVOT peak VTI 19 98 cm         AV mean gradient 8 mmHg         LVOT mn grad 1 mmHg         AV peak gradient 12 mmHg         AV LVOT peak gradient 2 mmHg         AV Velocity Ratio 0 36          Area/Dimensions   AV valve area 1 75 cm2         AV area by cont VTI 1 8 cm2         AV area peak pop 1 5 cm2         LVOT diameter 2 3 cm         LVOT area 4 15 cm2               Mitral Valve Measurements    Stenosis   MV mean gradient antegrade 2 mmHg         MV peak gradient antegrade 4 mmHg         MV VTI 42 34 cm         MV stenosis pressure 1/2 time 86 ms         MV valve area p 1/2 method 2 56 cm2         MV valve area by continuity eq 1 96 cm2               Tricuspid Valve Measurements    RVSP Parameters   TR Peak Pop 2 1 m/s Triscuspid Valve Regurgitation Peak Gradient 17 mmHg                  EKG: today    pending    I have personally reviewed pertinent films in PACS    TAVR evaluation Assessment:     Bran [address]: I    Assessment:   Aortic stenosis, Non-Rheumatic  S/P transacpical transcatheter aortic valve replacement    Gage Dela Cruz  is making good progress in their post-op recovery  They are at NYHA functional class I  Left chest wall incision is well healed  Weight and VS are stable  Recent echocardiogram demonstrates normally functioning TAVR   ECG, BMP & CBC pending  Plan:   Medications reviewed with patient  Plavix therapy per cardiology for PCI/BURAK pRCA (5/2022); Aspirin 81 mg daily is lifelong   Remaining medication management per Cardiology/PCP/Nephrology  Benefits of participating in cardiac rehabilitation discussed with patient and they are cleared to proceed with the program     May resume driving and all normal activities  Gage Dela Cruz  will return for one year follow-up in our office with repeat echocardiogram, ECG, CBC & BMP  Our office will contact patient to schedule appointment  They have been advised to maintain routine follow up with their cardiologist and PCP for ongoing medical care  Patient was comfortable with our recommendations and their questions were answered to their satisfaction      Routine referral to gastroenterology for colonoscopy screening was not indicated, as the patient is over 74years old    Maxine Mccollum  7/20/22  12:59 PM

## 2022-07-20 NOTE — LETTER
July 20, 2022     Catarina Mustafa, [address]  [address] [address]    Patient: Tara Marcelino  YOB: 1945   Date of Visit: 7/20/2022       Dear Dr Mino Ng:    Thank you for referring Marilyn Connell to me for evaluation  Below are my notes for this consultation  If you have questions, please do not hesitate to call me  I look forward to following your patient along with you  Sincerely,        Chidi Bullock,         CC: Severa Bos, MD Lamonte Paling, CRNP  7/20/2022  1:44 PM  Attested   POST OP FOLLOW UP VISIT S/P TAVR    Procedure: S/P Transcatheter aortic valve replacement with a 29 mm Smith KYLER 3 bioprosthetic valve via transapical approach, performed on 6/14/22  History: Tara Marcelino  is a 76y o  year old male who presents to our office today for routine follow up care from transapical transcatheter aortic valve replacement  Patient's PMHx is notable for CAD s/p CABG x 2 (9/2013) s/p PCI/BURAK pRCA (5/24/22), HTN, HLD, B/L Carotid stenosis (70%+), PAD/aorto-iliac disease w/claudication s/p multiple LE revascularization procedures, CKD4 (GFR 20), DM2, anemia, and colitis (h/o colectomy)  Post op rhythm: NSR w/ 1st degree AVB and new RBBB, ST depression and wide complex  Patient evaluated by Cardiology, cleared to resume low dose BB  Creatinine remained stable at baseline (CKD IV)  Patient discharged to home on POD # 3  Patient has had out patient f/u with PCP, Nephrology and Cardiology  ECG at Cardiology appt w/ T-Wave inversions  NM myocardial Perf study ordered  Metoprolol adjusted due to c/o dizziness  Today patient reports some improvement the dizziness but still present  He denies chest pain, SOB, fluid retention, palpitations  He states he has been able to perform activities such as mow the lawn and work in his garden  He reports he is unable to walk long distances due chronic claudication   He admits to continued tobacco abuse, 3 cigarettes per day  He denies issues with his surgical sites  Review of System:     History obtained from chart review and the patient  General ROS: negative  Psychological ROS: negative  Ophthalmic ROS: negative  ENT ROS: negative  Allergy and Immunology ROS: negative  Hematological and Lymphatic ROS: negative  Endocrine ROS: negative  Breast ROS: negative  Respiratory ROS: no cough, shortness of breath, or wheezing  Cardiovascular ROS: chronic LE claudication  no chest pain or dyspnea on exertion  Gastrointestinal ROS: no abdominal pain, change in bowel habits, or black or bloody stools  Genito-Urinary ROS: no dysuria, trouble voiding, or hematuria  Musculoskeletal ROS: negative  Neurological ROS: positive for - dizziness  negative for - gait disturbance, impaired coordination/balance, memory loss, numbness/tingling, seizures, speech problems, tremors or visual changes  Dermatological ROS: negative    Vital Signs:     Vitals:    07/20/22 1256   BP: 141/59   BP Location: Left arm   Patient Position: Sitting   Cuff Size: Standard   Pulse: 55   Resp: 18   Temp: (!) 96 7 °F (35 9 °C)   TempSrc: Tympanic   SpO2: 100%   Weight: 74 4 kg (164 lb)   Height: 5' 10" (1 778 m)       Home Medications:     Prior to Admission medications    Medication Sig Start Date End Date Taking?  Authorizing Provider   acetaminophen (TYLENOL) 325 mg tablet Take 3 tablets (975 mg total) by mouth every 8 (eight) hours  Patient not taking: Reported on 7/7/2022 6/17/22   Kana Blackwood PA-C   allopurinol (ZYLOPRIM) 100 mg tablet Take 1 tablet (100 mg total) by mouth daily 5/27/22   Suresh Jewell MD   amLODIPine (NORVASC) 10 mg tablet TAKE 1 TABLET DAILY 12/13/21   Judi Chapa MD   AMYLASE-LIPASE-PROTEASE PO Take by mouth     Historical Provider, MD   aspirin (ECOTRIN LOW STRENGTH) 81 mg EC tablet Take 81 mg by mouth daily    Historical Provider, MD   atorvastatin (LIPITOR) 80 mg tablet TAKE 1 TABLET DAILY AT     BEDTIME 2/23/22   Fidelina Chavarria MD   Cholecalciferol (VITAMIN D3 PO) Take by mouth    Historical Provider, MD   clopidogrel (PLAVIX) 75 mg tablet Take 1 tablet (75 mg total) by mouth in the morning  5/25/22   ZHANNA Hudson   Cyanocobalamin (VITAMIN B12 PO) Take by mouth once a week      Historical Provider, MD   docusate sodium (COLACE) 100 mg capsule Take 1 capsule (100 mg total) by mouth 2 (two) times a day  Patient not taking: Reported on 7/7/2022 6/17/22   Cassi Long PA-C   lisinopril (ZESTRIL) 10 mg tablet Take 1 tablet (10 mg total) by mouth daily 7/18/22   Trinity Delaney MD   Magnesium Oxide (MAG-200 PO) Take by mouth once a week     Historical Provider, MD   metoprolol succinate (TOPROL-XL) 25 mg 24 hr tablet Take 0 5 tablets (12 5 mg total) by mouth daily 7/7/22   ZHANNA Sanchez   Multiple Vitamin (MULTIVITAMIN ADULT PO) Take by mouth    Historical Provider, MD   mupirocin (BACTROBAN) 2 % ointment APPLY INTO EACH NOSTRIL TWICE DAILY  Patient not taking: Reported on 7/7/2022 6/1/22   Historical Provider, MD   oxyCODONE (ROXICODONE) 5 immediate release tablet Take 2 5 mg every 6 hours as needed for moderate pain or take 5 mg every 8 hours as needed for severe pain  Patient not taking: Reported on 7/7/2022 6/17/22   Cassi Long PA-C   pantoprazole (PROTONIX) 40 mg tablet TAKE 1 TABLET DAILY 6/11/22   Chris Monroe PA-C   polyethylene glycol (MIRALAX) 17 g packet Take 17 g by mouth daily  Patient not taking: Reported on 7/7/2022 6/18/22   Cassi Long PA-C   potassium chloride (K-DUR,KLOR-CON) 20 mEq tablet Take 1 tablet (20 mEq total) by mouth daily 7/7/22   ZHANNA Sanchez   torsemide (DEMADEX) 20 mg tablet Take 1 tablet (20 mg total) by mouth 2 (two) times a day 7/7/22   ZHANNA Sanchez       Physical Exam:    General: Alert, oriented, no acute distress  HEENT/NECK:  PERRLA  No jugular venous distention      Cardiac:Regular rate and rhythm, no murmurs rubs or gallops  Pulmonary:Breath sounds clear bilaterally  Abdomen:  Non-tender, Non-distended  Positive bowel sounds  Upper extremities: 2+ radial pulses; brisk capillary refill  Lower extremities: Extremities warm/dry  R femoral pulse 1+, no hematoma  PT/DP pulses 0 bilaterally  Trace edema B/L  Incisions: Left anterior chest wall apical approach incision healed  CT site with scab  Musculoskeletal: MAEE, stable gait  Neuro: Alert and oriented X 3  Sensation is grossly intact  No focal deficits  Skin: Warm/Dry, without rashes or lesions  Lab Results:   Lab Results   Component Value Date    SODIUM 140 06/20/2022    K 4 3 06/20/2022     06/20/2022    CO2 27 06/20/2022    BUN 53 (H) 06/20/2022    CREATININE 2 26 (H) 06/20/2022    GLUC 136 06/17/2022    CALCIUM 9 3 06/20/2022       Imaging Studies:     Transthoracic Echocardiogram: today    Left Ventricle Left ventricular cavity size is normal  Wall thickness is moderately increased  There is severe concentric hypertrophy  The left ventricular ejection fraction is 65%  Systolic function is normal   Wall motion is normal  Diastolic function is mildly abnormal, consistent with grade I (abnormal) relaxation  Right Ventricle Right ventricular cavity size is mildly dilated  Systolic function is normal    Left Atrium The atrium is mildly dilated  Right Atrium The atrium is mildly dilated  Aortic Valve There is an Smith KYLER 3 Ultra 29 mm TAVR bioprosthetic valve  The prosthetic valve appears well-seated  There is trace paravalvular regurgitation  The gradient recorded across the prosthetic aortic valve is within the expected range  The aortic valve peak velocity is 1 76 m/s  The aortic valve mean gradient is 8 mmHg  The DVI is 0 36  The aortic valve area is 1 75 cm2  Mitral Valve There is mild diffuse calcification limited to the leaflet base  The leaflets exhibit normal mobility  There is moderate annular calcification   There is mild regurgitation  There is no evidence of stenosis  Tricuspid Valve Tricuspid valve structure is normal  There is no evidence of regurgitation  There is no evidence of stenosis  The right ventricular systolic pressure is normal    Pulmonic Valve The pulmonic valve was not well visualized  There is no evidence of regurgitation  There is no evidence of stenosis  Ascending Aorta The aorta was not well visualized  IVC/SVC The inferior vena cava is dilated  Respirophasic changes were blunted (less than 50% variation)  Pericardium There is no pericardial effusion  The pericardium is normal in appearance       Left Ventricle Measurements    Function/Volumes   A4C EF 67 %         LVOT stroke volume 82 97 cm3         LVOT SI 42 ml/m2         Dimensions   LVIDd 5 3 cm         LVIDS 3 6 cm         IVSd 1 4 cm         LVPWd 1 3 cm         LVOT area 4 15 cm2         FS 32 %         Diastolic Filling   MV E' Tissue Velocity Septal 5 cm/s         MV E' Tissue Velocity Lateral 7 cm/s         E wave deceleration time 296 ms         MV Peak E Pop 69 cm/s         MV Peak A Pop 0 99 m/s          Report Measurements   AV LVOT peak gradient 2 mmHg              Interventricular Septum Measurements    Shunt Ratio   LVOT peak VTI 19 98 cm         LVOT peak pop 0 63 m/s              Right Ventricle Measurements    Dimensions   RVID d 5 cm               Left Atrium Measurements    Dimensions   LA length (A2C) 5 9 cm         EDD A4C 22 5 cm2               Right Atrium Measurements    Dimensions   RAA A4C 18 9 cm2               Atrial Septum Measurements    Shunt Ratio   LVOT peak VTI 19 98 cm         LVOT peak pop 0 63 m/s               Aortic Valve Measurements    Stenosis   Ao peak pop retrograde 1 76 m/s         LVOT peak pop 0 63 m/s         Ao VTI 47 4 cm         LVOT peak VTI 19 98 cm         AV mean gradient 8 mmHg         LVOT mn grad 1 mmHg         AV peak gradient 12 mmHg         AV LVOT peak gradient 2 mmHg         AV Velocity Ratio 0 36          Area/Dimensions   AV valve area 1 75 cm2         AV area by cont VTI 1 8 cm2         AV area peak pop 1 5 cm2         LVOT diameter 2 3 cm         LVOT area 4 15 cm2               Mitral Valve Measurements    Stenosis   MV mean gradient antegrade 2 mmHg         MV peak gradient antegrade 4 mmHg         MV VTI 42 34 cm         MV stenosis pressure 1/2 time 86 ms         MV valve area p 1/2 method 2 56 cm2         MV valve area by continuity eq 1 96 cm2               Tricuspid Valve Measurements    RVSP Parameters   TR Peak Pop 2 1 m/s         Triscuspid Valve Regurgitation Peak Gradient 17 mmHg                  EKG: today    pending    I have personally reviewed pertinent films in PACS    TAVR evaluation Assessment:     Jeannie Rbuio: I    Assessment:   Aortic stenosis, Non-Rheumatic  S/P transacpical transcatheter aortic valve replacement    Babs Nieves  is making good progress in their post-op recovery  They are at NYHA functional class I  Left chest wall incision is well healed  Weight and VS are stable  Recent echocardiogram demonstrates normally functioning TAVR   ECG, BMP & CBC pending  Plan:   Medications reviewed with patient  Plavix therapy per cardiology for PCI/BURAK pRCA (5/2022); Aspirin 81 mg daily is lifelong   Remaining medication management per Cardiology/PCP/Nephrology  Benefits of participating in cardiac rehabilitation discussed with patient and they are cleared to proceed with the program     May resume driving and all normal activities  Babs Nieves  will return for one year follow-up in our office with repeat echocardiogram, ECG, CBC & BMP  Our office will contact patient to schedule appointment  They have been advised to maintain routine follow up with their cardiologist and PCP for ongoing medical care  Patient was comfortable with our recommendations and their questions were answered to their satisfaction      Routine referral to gastroenterology for colonoscopy screening was not indicated, as the patient is over 76years old    John Geiger, [place]  7/20/22  12:59 PM  Attestation signed by Roro Zheng DO at 7/20/2022  2:05 PM:  I supervised the Advanced Practitioner  ? I performed, in its entirety, the assessment/plan component of the visit  II agree with the Advanced Practitioner's note with the following additions/exceptions:      Mr Garcia Officer was seen back in the office after his transapical TAVR  He has recovered very well  His echocardiogram performed today was reviewed by myself personally and findings shared with him demonstrating a well-functioning TAVR in good ventricular function  His apex is well healed  I recommended a 1 year follow-up echocardiogram in the valve Clinic  He has also been a given a prescription for cardiac rehab, however at this point he feels he is back to his baseline and will not pursue cardiac rehab      Roro Zheng DO 07/20/22

## 2022-07-27 ENCOUNTER — OFFICE VISIT (OUTPATIENT)
Dept: FAMILY MEDICINE CLINIC | Facility: CLINIC | Age: 77
End: 2022-07-27
Payer: MEDICARE

## 2022-07-27 VITALS
SYSTOLIC BLOOD PRESSURE: 140 MMHG | RESPIRATION RATE: 18 BRPM | DIASTOLIC BLOOD PRESSURE: 62 MMHG | TEMPERATURE: 97.2 F | HEART RATE: 57 BPM | WEIGHT: 166 LBS | HEIGHT: 70 IN | BODY MASS INDEX: 23.77 KG/M2 | OXYGEN SATURATION: 97 %

## 2022-07-27 DIAGNOSIS — E78.2 MIXED HYPERLIPIDEMIA: ICD-10-CM

## 2022-07-27 DIAGNOSIS — E11.22 CONTROLLED TYPE 2 DIABETES MELLITUS WITH STAGE 4 CHRONIC KIDNEY DISEASE, WITHOUT LONG-TERM CURRENT USE OF INSULIN (HCC): ICD-10-CM

## 2022-07-27 DIAGNOSIS — T82.858S STENOSIS OF NONCORONARY BYPASS GRAFT, SEQUELA: ICD-10-CM

## 2022-07-27 DIAGNOSIS — I25.10 CORONARY ARTERY DISEASE INVOLVING NATIVE CORONARY ARTERY OF NATIVE HEART WITHOUT ANGINA PECTORIS: ICD-10-CM

## 2022-07-27 DIAGNOSIS — R91.1 LUNG NODULE: ICD-10-CM

## 2022-07-27 DIAGNOSIS — N18.4 CONTROLLED TYPE 2 DIABETES MELLITUS WITH STAGE 4 CHRONIC KIDNEY DISEASE, WITHOUT LONG-TERM CURRENT USE OF INSULIN (HCC): ICD-10-CM

## 2022-07-27 DIAGNOSIS — Z00.00 WELL ADULT EXAM: Primary | ICD-10-CM

## 2022-07-27 DIAGNOSIS — I10 PRIMARY HYPERTENSION: ICD-10-CM

## 2022-07-27 DIAGNOSIS — I70.45: ICD-10-CM

## 2022-07-27 DIAGNOSIS — K21.9 GASTROESOPHAGEAL REFLUX DISEASE WITHOUT ESOPHAGITIS: ICD-10-CM

## 2022-07-27 PROCEDURE — G0439 PPPS, SUBSEQ VISIT: HCPCS | Performed by: FAMILY MEDICINE

## 2022-07-27 PROCEDURE — 99214 OFFICE O/P EST MOD 30 MIN: CPT | Performed by: FAMILY MEDICINE

## 2022-07-27 NOTE — PATIENT INSTRUCTIONS
Medicare Preventive Visit Patient Instructions  Thank you for completing your Welcome to Medicare Visit or Medicare Annual Wellness Visit today  Your next wellness visit will be due in one year (7/28/2023)  The screening/preventive services that you may require over the next 5-10 years are detailed below  Some tests may not apply to you based off risk factors and/or age  Screening tests ordered at today's visit but not completed yet may show as past due  Also, please note that scanned in results may not display below  Preventive Screenings:  Service Recommendations Previous Testing/Comments   Colorectal Cancer Screening  · Colonoscopy    · Fecal Occult Blood Test (FOBT)/Fecal Immunochemical Test (FIT)  · Fecal DNA/Cologuard Test  · Flexible Sigmoidoscopy Age: 54-65 years old   Colonoscopy: every 10 years (May be performed more frequently if at higher risk)  OR  FOBT/FIT: every 1 year  OR  Cologuard: every 3 years  OR  Sigmoidoscopy: every 5 years  Screening may be recommended earlier than age 48 if at higher risk for colorectal cancer  Also, an individualized decision between you and your healthcare provider will decide whether screening between the ages of 74-80 would be appropriate   Colonoscopy: 12/21/2021  FOBT/FIT: Not on file  Cologuard: Not on file  Sigmoidoscopy: Not on file          Prostate Cancer Screening Individualized decision between patient and health care provider in men between ages of 53-78   Medicare will cover every 12 months beginning on the day after your 50th birthday PSA: 1 7 ng/mL     Screening Not Indicated     Hepatitis C Screening Once for adults born between 1945 and 1965  More frequently in patients at high risk for Hepatitis C Hep C Antibody: 09/24/2021    Screening Current   Diabetes Screening 1-2 times per year if you're at risk for diabetes or have pre-diabetes Fasting glucose: 141 mg/dL   A1C: 5 6 %    Screening Not Indicated  History Diabetes   Cholesterol Screening Once every 5 years if you don't have a lipid disorder  May order more often based on risk factors  Lipid panel: 05/05/2022    Screening Not Indicated  History Lipid Disorder      Other Preventive Screenings Covered by Medicare:  1  Abdominal Aortic Aneurysm (AAA) Screening: covered once if your at risk  You're considered to be at risk if you have a family history of AAA or a male between the age of 73-68 who smoking at least 100 cigarettes in your lifetime  2  Lung Cancer Screening: covers low dose CT scan once per year if you meet all of the following conditions: (1) Age 50-69; (2) No signs or symptoms of lung cancer; (3) Current smoker or have quit smoking within the last 15 years; (4) You have a tobacco smoking history of at least 30 pack years (packs per day x number of years you smoked); (5) You get a written order from a healthcare provider  3  Glaucoma Screening: covered annually if you're considered high risk: (1) You have diabetes OR (2) Family history of glaucoma OR (3)  aged 48 and older OR (3)  American aged 72 and older  3  Osteoporosis Screening: covered every 2 years if you meet one of the following conditions: (1) Have a vertebral abnormality; (2) On glucocorticoid therapy for more than 3 months; (3) Have primary hyperparathyroidism; (4) On osteoporosis medications and need to assess response to drug therapy  5  HIV Screening: covered annually if you're between the age of 12-76  Also covered annually if you are younger than 13 and older than 72 with risk factors for HIV infection  For pregnant patients, it is covered up to 3 times per pregnancy      Immunizations:  Immunization Recommendations   Influenza Vaccine Annual influenza vaccination during flu season is recommended for all persons aged >= 6 months who do not have contraindications   Pneumococcal Vaccine (Prevnar and Pneumovax)  * Prevnar = PCV13  * Pneumovax = PPSV23 Adults 25-60 years old: 1-3 doses may be recommended based on certain risk factors  Adults 72 years old: Prevnar (PCV13) vaccine recommended followed by Pneumovax (PPSV23) vaccine  If already received PPSV23 since turning 65, then PCV13 recommended at least one year after PPSV23 dose  Hepatitis B Vaccine 3 dose series if at intermediate or high risk (ex: diabetes, end stage renal disease, liver disease)   Tetanus (Td) Vaccine - COST NOT COVERED BY MEDICARE PART B Following completion of primary series, a booster dose should be given every 10 years to maintain immunity against tetanus  Td may also be given as tetanus wound prophylaxis  Tdap Vaccine - COST NOT COVERED BY MEDICARE PART B Recommended at least once for all adults  For pregnant patients, recommended with each pregnancy  Shingles Vaccine (Shingrix) - COST NOT COVERED BY MEDICARE PART B  2 shot series recommended in those aged 48 and above     Health Maintenance Due:      Topic Date Due    Hepatitis C Screening  Completed     Immunizations Due:      Topic Date Due    COVID-19 Vaccine (4 - Booster for Pfizer series) 01/30/2022    Influenza Vaccine (1) 09/01/2022     Advance Directives   What are advance directives? Advance directives are legal documents that state your wishes and plans for medical care  These plans are made ahead of time in case you lose your ability to make decisions for yourself  Advance directives can apply to any medical decision, such as the treatments you want, and if you want to donate organs  What are the types of advance directives? There are many types of advance directives, and each state has rules about how to use them  You may choose a combination of any of the following:  · Living will: This is a written record of the treatment you want  You can also choose which treatments you do not want, which to limit, and which to stop at a certain time  This includes surgery, medicine, IV fluid, and tube feedings  · Durable power of  for healthcare [facility]):   This is a written record that states who you want to make healthcare choices for you when you are unable to make them for yourself  This person, called a proxy, is usually a family member or a friend  You may choose more than 1 proxy  · Do not resuscitate (DNR) order:  A DNR order is used in case your heart stops beating or you stop breathing  It is a request not to have certain forms of treatment, such as CPR  A DNR order may be included in other types of advance directives  · Medical directive: This covers the care that you want if you are in a coma, near death, or unable to make decisions for yourself  You can list the treatments you want for each condition  Treatment may include pain medicine, surgery, blood transfusions, dialysis, IV or tube feedings, and a ventilator (breathing machine)  · Values history: This document has questions about your views, beliefs, and how you feel and think about life  This information can help others choose the care that you would choose  Why are advance directives important? An advance directive helps you control your care  Although spoken wishes may be used, it is better to have your wishes written down  Spoken wishes can be misunderstood, or not followed  Treatments may be given even if you do not want them  An advance directive may make it easier for your family to make difficult choices about your care  Cigarette Smoking and Your Health   Risks to your health if you smoke:  Nicotine and other chemicals found in tobacco damage every cell in your body  Even if you are a light smoker, you have an increased risk for cancer, heart disease, and lung disease  If you are pregnant or have diabetes, smoking increases your risk for complications  Benefits to your health if you stop smoking:   · You decrease respiratory symptoms such as coughing, wheezing, and shortness of breath     · You reduce your risk for cancers of the lung, mouth, throat, kidney, bladder, pancreas, stomach, and cervix  If you already have cancer, you increase the benefits of chemotherapy  You also reduce your risk for cancer returning or a second cancer from developing  · You reduce your risk for heart disease, blood clots, heart attack, and stroke  · You reduce your risk for lung infections, and diseases such as pneumonia, asthma, chronic bronchitis, and emphysema  · Your circulation improves  More oxygen can be delivered to your body  If you have diabetes, you lower your risk for complications, such as kidney, artery, and eye diseases  You also lower your risk for nerve damage  Nerve damage can lead to amputations, poor vision, and blindness  · You improve your body's ability to heal and to fight infections  For more information and support to stop smoking:   · Smokefree  gov  Phone: 9- 456 - 281-0171  Web Address: www [employer]  [facility] 2018 Information is for End User's use only and may not be sold, redistributed or otherwise used for commercial purposes   All illustrations and images included in CareNotes® are the copyrighted property of A D A M , Inc  or [address] [place] [address]

## 2022-07-27 NOTE — PROGRESS NOTES
Assessment and Plan:     Problem List Items Addressed This Visit        Digestive    GERD (gastroesophageal reflux disease)     Patient to continue with present therapy and decrease caffeine, avoid ETOH and smoking to decrease acid production  Pt should also cease eating prior to bedtime and avoid excessive fluid intake prior to sleep  May use antacids as needed for breakthrough GERD  All pateint questions answered today about this condition  Endocrine    Controlled type 2 diabetes mellitus with stage 4 chronic kidney disease, without long-term current use of insulin ([facility] )     Patient is stable with current meds and discussed a low carb diet  Pt  recommended to see eye doctor and foot doctor for routine screening as per protocol  Recheck A1C  and Cr in 3 months  Patient questions answered today about this condtion  Lab Results   Component Value Date    HGBA1C 5 6 03/23/2022               Cardiovascular and Mediastinum    Stenosis of noncoronary bypass graft ([facility] [address] )    Hypertension     Patient is stable with current anti-hypertensive medicine and continue to follow a low sodium diet and take current medication  All questions about this condition were answered today  CAD (coronary artery disease)     Patient to continue  with current cardiac meds to decrease risk of re stenosis  Patient to follow up with cardiology for scheduled appointments to decrease risk for further cardiac problems with appropriate diagnostic testing to reassess cardiac status  Patient had alll questions about this problem answered today  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 ([facility] )       Other    Hyperlipidemia     Patient  is stable with current medication and we discussed a low fat low cholesterol diet  Weight loss also discussed for this will help lower cholesterol also  Recheck lipids in 6 months             Other Visit Diagnoses     Well adult exam    -  Primary Lung nodule        Relevant Orders    CT lung nodule follow-up           Preventive health issues were discussed with patient, and age appropriate screening tests were ordered as noted in patient's After Visit Summary  Personalized health advice and appropriate referrals for health education or preventive services given if needed, as noted in patient's After Visit Summary       History of Present Illness:     Patient presents for a Medicare Wellness Visit    HPI   Patient Care Team:  Lady Glover MD as PCP - General (Family Medicine)  MD Asim Tapia MD Christobal Manual, MD Dudley Champagne, MD Forde Leber, Prentis Kappa, MD Rosslyn Razor, MD (Gastroenterology)     Review of Systems:     Review of Systems     Problem List:     Patient Active Problem List   Diagnosis    Atherosclerosis of native arteries of extremities with intermittent claudication, bilateral legs ([address] )    PVD (peripheral vascular disease) ([address] )    Stenosis of noncoronary bypass graft ([address] )    Asymptomatic bilateral carotid artery stenosis    S/P CABG (coronary artery bypass graft)    Hypertension    Aorto-iliac disease ([address] )    Renal artery stenosis, native, bilateral ([address] )    CAD (coronary artery disease)    Progressive angina (HCC)    Tension headache    Cigarette nicotine dependence with nicotine-induced disorder    Calcific tendinitis of right shoulder region    Right shoulder pain    Sinus bradycardia    Controlled type 2 diabetes mellitus with stage 4 chronic kidney disease, without long-term current use of insulin ([address] )    GERD (gastroesophageal reflux disease)    Hyperlipidemia    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 ([address] )    Persistent proteinuria, unspecified    Benign hypertension with CKD (chronic kidney disease) stage IV (HCC)    Leukopenia    Hypomagnesemia    Renal cyst, left    Acute kidney injury superimposed on chronic kidney disease (HCC)    Iron deficiency anemia    Chronic diastolic CHF (congestive heart failure) (HCC)    Elevated serum creatinine    S/P TAVR (transcatheter aortic valve replacement)    Benign hypertension with chronic kidney disease, stage III (HCC)    Secondary hyperparathyroidism of renal origin [facility])      Past Medical and Surgical History:     Past Medical History:   Diagnosis Date    CAD (coronary artery disease)     Carotid stenosis, asymptomatic, bilateral     Chronic kidney disease     Diabetes [facility] )     type 2, non-insulin dependent    Diabetes mellitus ([facility] )     GERD (gastroesophageal reflux disease)     History of nephrolithiasis     Hyperlipidemia     Hypertension     [facility]AD [facility]peripheral artery disease) ([facility])     Severe aortic stenosis      Past Surgical History:   Procedure Laterality Date    APPENDECTOMY      CARDIAC CATHETERIZATION N/A 5/5/2022    Procedure: CARDIAC RHC/LHC; Surgeon: Ede Bright DO;  [facility]; Service: Cardiology    CARDIAC CATHETERIZATION N/A 5/5/2022    Procedure: Cardiac Coronary Angiogram;  Surgeon: Ede Bright DO;  [facility]; Service: Cardiology    CARDIAC CATHETERIZATION N/A 5/24/2022    Procedure: Cardiac pci;  Surgeon: Savi Linn MD;  [facility];   Service: Cardiology    CARDIAC CATHETERIZATION N/A 6/14/2022    Procedure: CARDIAC TAVR;  Surgeon: Juana Carpio MD;  Location: [facility] MAIN OR;  Service: Cardiology    COLECTOMY      COLONOSCOPY  2013    CORONARY ARTERY BYPASS GRAFT  2013    X 2    FEMORAL ARTERY - POPLITEAL ARTERY BYPASS GRAFT      HEMORRHOID SURGERY      IR AORTAGRAM WITH RUN-OFF  11/19/2018    MS SLCTV CATHJ 3RD+ ORD SLCTV ABDL PEL/LXTR [facility] Left 8/12/2016    Procedure: LEFT FEMORAL ARTERIOGRAM; BALLOON ANGIOPLASTY; SFA  AND FEMORAL AT VEIN GRAFT;  Surgeon: Arley Meraz MD;  Location: [facility] MAIN [facility];  Service: Vascular    MD TRANSCATHETER TRANSAPICAL REPLACEMT AORTIC VALVE N/A 2022    Procedure: REPLACEMENT AORTIC VALVE TRANSCATHETER (TAVR) TRANSAPICAL 29MM IRVIN KYLER S3 ULTRA VALVE(ACCESS ON LEFT) ELANA;  Surgeon: Tad Allen DO;  Location[facility];  Service: Cardiac Surgery    TONSILLECTOMY AND ADENOIDECTOMY        Family History:     Family History   Problem Relation Age of Onset    Heart attack Father     Other Sister         bypass and vlave replacement    Stroke Paternal Uncle     Arrhythmia Neg Hx     Asthma Neg Hx     Clotting disorder Neg Hx     Fainting Neg Hx     Anuerysm Neg Hx     Hypertension Neg Hx         unsure     Hyperlipidemia Neg Hx     Heart failure Neg Hx       Social History:     Social History     Socioeconomic History    Marital status:      Spouse name: None    Number of children: None    Years of education: None    Highest education level: None   Occupational History    None   Tobacco Use    Smoking status: Current Every Day Smoker     Packs/day: 0 25     Years: 50 00     Pack years: 12 50     Types: Cigarettes    Smokeless tobacco: Never Used    Tobacco comment: 3-4 cigarettes daily   Vaping Use    Vaping Use: Never used   Substance and Sexual Activity    Alcohol use: Yes     Comment: rare    Drug use: No    Sexual activity: None   Other Topics Concern    None   Social History Narrative    · Most recent tobacco use screenin2018      · Do you currently or have you served in [employer]: Yes      · If Yes, What branch of service:   [employer]      · Occupation:   Dentists      · Exercise level:   Occasional        · Caffeine intake:   Heavy      · Marital status:         · Diet:   Regular  low fat     · Seat belts used routinely:   Yes      · Smoke alarm in home: Yes      · Advance directive:    Yes      · General stress level:   Low      Social Determinants of Health     Financial Resource Strain: Not on file   Food Insecurity: No Food Insecurity    Worried About Running Out of Food in the Last Year: Never true    Noris of Food in the Last Year: Never true   Transportation Needs: No Transportation Needs    Lack of Transportation (Medical): No    Lack of Transportation (Non-Medical): No   Physical Activity: Not on file   Stress: Not on file   Social Connections: Not on file   Intimate Partner Violence: Not on file   Housing Stability: Low Risk     Unable to Pay for Housing in the Last Year: No    Number of Places Lived in the Last Year: 1    Unstable Housing in the Last Year: No      Medications and Allergies:     Current Outpatient Medications   Medication Sig Dispense Refill    allopurinol (ZYLOPRIM) 100 mg tablet Take 1 tablet (100 mg total) by mouth daily 90 tablet 3    amLODIPine (NORVASC) 10 mg tablet TAKE 1 TABLET DAILY 90 tablet 3    AMYLASE-LIPASE-PROTEASE PO Take by mouth       aspirin (ECOTRIN LOW STRENGTH) 81 mg EC tablet Take 81 mg by mouth daily      atorvastatin (LIPITOR) 80 mg tablet TAKE 1 TABLET DAILY AT     BEDTIME 90 tablet 3    Cholecalciferol (VITAMIN D3 PO) Take by mouth      clopidogrel (PLAVIX) 75 mg tablet Take 1 tablet (75 mg total) by mouth in the morning   90 tablet 3    Cyanocobalamin (VITAMIN B12 PO) Take by mouth once a week        docusate sodium (COLACE) 100 mg capsule Take 1 capsule (100 mg total) by mouth 2 (two) times a day  0    lisinopril (ZESTRIL) 10 mg tablet Take 1 tablet (10 mg total) by mouth daily 90 tablet 3    Magnesium Oxide (MAG-200 PO) Take by mouth once a week       metoprolol succinate (TOPROL-XL) 25 mg 24 hr tablet Take 0 5 tablets (12 5 mg total) by mouth daily 45 tablet 1    Multiple Vitamin (MULTIVITAMIN ADULT PO) Take by mouth      pantoprazole (PROTONIX) 40 mg tablet TAKE 1 TABLET DAILY 90 tablet 1    potassium chloride (K-DUR,KLOR-CON) 20 mEq tablet Take 1 tablet (20 mEq total) by mouth daily 90 tablet 3    torsemide (DEMADEX) 20 mg tablet Take 1 tablet (20 mg total) by mouth 2 (two) times a day 180 tablet 3    acetaminophen (TYLENOL) 325 mg tablet Take 3 tablets (975 mg total) by mouth every 8 (eight) hours (Patient not taking: No sig reported)  0     No current facility-administered medications for this visit  No Known Allergies   Immunizations:     Immunization History   Administered Date(s) Administered    COVID-19 PFIZER VACCINE 0 3 ML IM 03/03/2021, 03/24/2021, 09/30/2021    H1N1 Inj 11/15/2020    H1N1, All Formulations 11/15/2020    INFLUENZA 10/04/2011, 10/19/2012, 10/16/2014, 10/13/2016, 10/06/2018, 11/11/2021    Influenza Split High Dose Preservative Free IM 10/06/2018, 11/01/2019    Pneumococcal Conjugate 13-Valent 12/04/2015    Pneumococcal Polysaccharide PPV23 06/11/2019    Tdap 10/15/2021      Health Maintenance:         Topic Date Due    Hepatitis C Screening  Completed         Topic Date Due    COVID-19 Vaccine (4 - Booster for Donato Bailey series) 01/30/2022    Influenza Vaccine (1) 09/01/2022      Medicare Screening Tests and Risk Assessments:     Marie Connelly is here for his Subsequent Wellness visit  Health Risk Assessment:   Patient rates overall health as fair  Patient feels that their physical health rating is slightly worse  Patient is very satisfied with their life  Eyesight was rated as same  Hearing was rated as same  Patient feels that their emotional and mental health rating is same  Patients states they are never, rarely angry  Patient states they are never, rarely unusually tired/fatigued  Pain experienced in the last 7 days has been some  Patient's pain rating has been 4/10  Patient states that he has experienced no weight loss or gain in last 6 months  Depression Screening:   PHQ-2 Score: 0      Fall Risk Screening: In the past year, patient has experienced: no history of falling in past year      Home Safety:  Patient does not have trouble with stairs inside or outside of their home   Patient has working smoke alarms and has working carbon monoxide detector  Home safety hazards include: none  Nutrition:   Current diet is Regular  Medications:   Patient is not currently taking any over-the-counter supplements  Patient is able to manage medications  Activities of Daily Living (ADLs)/Instrumental Activities of Daily Living (IADLs):   Walk and transfer into and out of bed and chair?: Yes  Dress and groom yourself?: Yes    Bathe or shower yourself?: Yes    Feed yourself? Yes  Do your laundry/housekeeping?: Yes  Manage your money, pay your bills and track your expenses?: Yes  Make your own meals?: Yes    Do your own shopping?: Yes    Previous Hospitalizations:   Any hospitalizations or ED visits within the last 12 months?: Yes    How many hospitalizations have you had in the last year?: 1-2    Advance Care Planning:   Living will: Yes    Durable POA for healthcare: Yes    Advanced directive: Yes    Advanced directive counseling given: No    Five wishes given: No    Patient declined ACP directive: No    End of Life Decisions reviewed with patient: Yes    Provider agrees with end of life decisions: Yes      Cognitive Screening:   Provider or family/friend/caregiver concerned regarding cognition?: No    PREVENTIVE SCREENINGS      Cardiovascular Screening:    General: Screening Not Indicated and History Lipid Disorder      Diabetes Screening:     General: Screening Not Indicated and History Diabetes      Prostate Cancer Screening:    General: Screening Not Indicated      Abdominal Aortic Aneurysm (AAA) Screening:    Risk factors include: tobacco use        Lung Cancer Screening:     General: Screening Not Indicated      Hepatitis C Screening:    General: Screening Current    Screening, Brief Intervention, and Referral to Treatment (SBIRT)    Screening  Typical number of drinks in a day: 0  Typical number of drinks in a week: 0  Interpretation: Low risk drinking behavior      Single Item Drug Screening:  How often have you used an illegal drug (including marijuana) or a prescription medication for non-medical reasons in the past year? never    Single Item Drug Screen Score: 0  Interpretation: Negative screen for possible drug use disorder    No exam data present     Physical Exam:     /62 (BP Location: Left arm, Patient Position: Sitting, Cuff Size: Standard)   Pulse 57   Temp (!) 97 2 °F (36 2 °C) (Temporal)   Resp 18   Ht 5' 10" (1 778 m)   Wt 75 3 kg (166 lb)   SpO2 97%   BMI 23 82 kg/m²     Physical Exam     Lawanda Tripp MD

## 2022-07-27 NOTE — PROGRESS NOTES
Falls Plan of Care: balance, strength, and gait training instructions were provided  Home safety education provided  Assessment/Plan:         Problem List Items Addressed This Visit        Digestive    GERD (gastroesophageal reflux disease)     Patient to continue with present therapy and decrease caffeine, avoid ETOH and smoking to decrease acid production  Pt should also cease eating prior to bedtime and avoid excessive fluid intake prior to sleep  May use antacids as needed for breakthrough GERD  All pateint questions answered today about this condition  Endocrine    Controlled type 2 diabetes mellitus with stage 4 chronic kidney disease, without long-term current use of insulin ([facility] [address] )     Patient is stable with current meds and discussed a low carb diet  Pt  recommended to see eye doctor and foot doctor for routine screening as per protocol  Recheck A1C  and Cr in 3 months  Patient questions answered today about this condtion  Lab Results   Component Value Date    HGBA1C 5 6 03/23/2022               Cardiovascular and Mediastinum    Stenosis of noncoronary bypass graft ([facility] )    Hypertension     Patient is stable with current anti-hypertensive medicine and continue to follow a low sodium diet and take current medication  All questions about this condition were answered today  CAD (coronary artery disease)     Patient to continue  with current cardiac meds to decrease risk of re stenosis  Patient to follow up with cardiology for scheduled appointments to decrease risk for further cardiac problems with appropriate diagnostic testing to reassess cardiac status  Patient had alll questions about this problem answered today  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 ([facility] )       Other    Hyperlipidemia     Patient  is stable with current medication and we discussed a low fat low cholesterol diet   Weight loss also discussed for this will help lower cholesterol also  Recheck lipids in 6 months  Other Visit Diagnoses     Well adult exam    -  Primary    Lung nodule        Relevant Orders    CT lung nodule follow-up            Subjective:      Patient ID: Misha Queen  is a 76 y o  male  75-year-old male here today for his Medicare wellness  Patient doing well with the his medical problems and he has coronary artery disease hypertension hyperlipidemia GERD peripheral artery disease recently had TAVR done and is doing very well with that  Patient was found prior to his TAVR to have a 7 mm lung nodule that he needs follow-up on which will be in October  Will order that for him today and I will see him after that is done to review the results of that  The following portions of the patient's history were reviewed and updated as appropriate:   Past Medical History:  He has a past medical history of CAD (coronary artery disease), Carotid stenosis, asymptomatic, bilateral, Chronic kidney disease, Diabetes ([facility] [facility] ), Diabetes mellitus ([facility] ), GERD (gastroesophageal reflux disease), History of nephrolithiasis, Hyperlipidemia, Hypertension, PAD (peripheral artery disease) ([facility] ), and Severe aortic stenosis  ,  _______________________________________________________________________  Medical Problems:  does not have any pertinent problems on file ,  _______________________________________________________________________  Past Surgical History:   has a past surgical history that includes Coronary artery bypass graft (2013); Femoral artery - popliteal artery bypass graft; Appendectomy; Colectomy; Tonsillectomy and adenoidectomy; pr slctv cathj 3rd+ ord slctv abdl pel/lxtr brnch (Left, 8/12/2016); Colonoscopy (2013); Hemorrhoid surgery; IR aortagram with run-off (11/19/2018); Cardiac catheterization (N/A, 5/5/2022); Cardiac catheterization (N/A, 5/5/2022);  Cardiac catheterization (N/A, 5/24/2022); pr transcatheter transapical replacemt aortic valve (N/A, 6/14/2022); and Cardiac catheterization (N/A, 6/14/2022)  ,  _______________________________________________________________________  Family History:  family history includes Heart attack in his father; Other in his sister; Stroke in his paternal uncle ,  _______________________________________________________________________  Social History:   reports that he has been smoking cigarettes  He has a 12 50 pack-year smoking history  He has never used smokeless tobacco  He reports current alcohol use  He reports that he does not use drugs  ,  _______________________________________________________________________  Allergies:  has No Known Allergies     _______________________________________________________________________  Current Outpatient Medications   Medication Sig Dispense Refill    allopurinol (ZYLOPRIM) 100 mg tablet Take 1 tablet (100 mg total) by mouth daily 90 tablet 3    amLODIPine (NORVASC) 10 mg tablet TAKE 1 TABLET DAILY 90 tablet 3    AMYLASE-LIPASE-PROTEASE PO Take by mouth       aspirin (ECOTRIN LOW STRENGTH) 81 mg EC tablet Take 81 mg by mouth daily      atorvastatin (LIPITOR) 80 mg tablet TAKE 1 TABLET DAILY AT     BEDTIME 90 tablet 3    Cholecalciferol (VITAMIN D3 PO) Take by mouth      clopidogrel (PLAVIX) 75 mg tablet Take 1 tablet (75 mg total) by mouth in the morning   90 tablet 3    Cyanocobalamin (VITAMIN B12 PO) Take by mouth once a week        docusate sodium (COLACE) 100 mg capsule Take 1 capsule (100 mg total) by mouth 2 (two) times a day  0    lisinopril (ZESTRIL) 10 mg tablet Take 1 tablet (10 mg total) by mouth daily 90 tablet 3    Magnesium Oxide (MAG-200 PO) Take by mouth once a week       metoprolol succinate (TOPROL-XL) 25 mg 24 hr tablet Take 0 5 tablets (12 5 mg total) by mouth daily 45 tablet 1    Multiple Vitamin (MULTIVITAMIN ADULT PO) Take by mouth      pantoprazole (PROTONIX) 40 mg tablet TAKE 1 TABLET DAILY 90 tablet 1    potassium chloride (K-DUR,KLOR-CON) 20 mEq tablet Take 1 tablet (20 mEq total) by mouth daily 90 tablet 3    torsemide (DEMADEX) 20 mg tablet Take 1 tablet (20 mg total) by mouth 2 (two) times a day 180 tablet 3    acetaminophen (TYLENOL) 325 mg tablet Take 3 tablets (975 mg total) by mouth every 8 (eight) hours (Patient not taking: No sig reported)  0     No current facility-administered medications for this visit      _______________________________________________________________________  Review of Systems      Objective:  Vitals:    07/27/22 1640   BP: 140/62   BP Location: Left arm   Patient Position: Sitting   Cuff Size: Standard   Pulse: 57   Resp: 18   Temp: (!) 97 2 °F (36 2 °C)   TempSrc: Temporal   SpO2: 97%   Weight: 75 3 kg (166 lb)   Height: 5' 10" (1 778 m)     Body mass index is 23 82 kg/m²       Physical Exam

## 2022-08-15 ENCOUNTER — HOSPITAL ENCOUNTER (OUTPATIENT)
Dept: NON INVASIVE DIAGNOSTICS | Facility: CLINIC | Age: 77
Discharge: HOME | End: 2022-08-15
Payer: MEDICARE

## 2022-08-15 DIAGNOSIS — R94.31 ABNORMAL EKG: ICD-10-CM

## 2022-08-15 DIAGNOSIS — Z95.2 S/P TAVR (TRANSCATHETER AORTIC VALVE REPLACEMENT): ICD-10-CM

## 2022-08-15 LAB
NUC STRESS EJECTION FRACTION: 49 %
RATE PRESSURE PRODUCT: NORMAL
SL CV REST NUCLEAR ISOTOPE DOSE: 10.9 MCI
SL CV STRESS NUCLEAR ISOTOPE DOSE: 32.5 MCI
SL CV STRESS RECOVERY BP: NORMAL MMHG
SL CV STRESS RECOVERY HR: 68 BPM
SL CV STRESS RECOVERY O2 SAT: 98 %
STRESS ANGINA INDEX: 0
STRESS BASELINE BP: NORMAL MMHG
STRESS BASELINE HR: 50 BPM
STRESS O2 SAT REST: 98 %
STRESS PEAK HR: 78 BPM
STRESS POST O2 SAT PEAK: 99 %
STRESS POST PEAK BP: 138 MMHG
STRESS/REST PERFUSION RATIO: 0.97

## 2022-08-15 PROCEDURE — 93017 CV STRESS TEST TRACING ONLY: CPT

## 2022-08-15 PROCEDURE — 78452 HT MUSCLE IMAGE SPECT MULT: CPT | Performed by: INTERNAL MEDICINE

## 2022-08-15 PROCEDURE — 93016 CV STRESS TEST SUPVJ ONLY: CPT | Performed by: INTERNAL MEDICINE

## 2022-08-15 PROCEDURE — A9502 TC99M TETROFOSMIN: HCPCS

## 2022-08-15 PROCEDURE — 93018 CV STRESS TEST I&R ONLY: CPT | Performed by: INTERNAL MEDICINE

## 2022-08-15 PROCEDURE — G1004 CDSM NDSC: HCPCS

## 2022-08-15 PROCEDURE — 78452 HT MUSCLE IMAGE SPECT MULT: CPT

## 2022-08-15 RX ADMIN — REGADENOSON 0.4 MG: 0.08 INJECTION, SOLUTION INTRAVENOUS at 13:35

## 2022-08-16 LAB
CHEST PAIN STATEMENT: NORMAL
MAX DIASTOLIC BP: 56 MMHG
MAX HEART RATE: 78 BPM
MAX PREDICTED HEART RATE: 141 BPM
MAX. SYSTOLIC BP: 166 MMHG
PROTOCOL NAME: NORMAL
REASON FOR TERMINATION: NORMAL
TARGET HR FORMULA: NORMAL
TEST INDICATION: NORMAL
TIME IN EXERCISE PHASE: NORMAL

## 2022-08-25 ENCOUNTER — LAB (OUTPATIENT)
Dept: LAB | Facility: MEDICAL CENTER | Age: 77
End: 2022-08-25
Payer: MEDICARE

## 2022-08-25 DIAGNOSIS — N18.4 STAGE 4 CHRONIC KIDNEY DISEASE (HCC): ICD-10-CM

## 2022-08-25 LAB
ANION GAP SERPL CALCULATED.3IONS-SCNC: 5 MMOL/L (ref 4–13)
BUN SERPL-MCNC: 40 MG/DL (ref 5–25)
CALCIUM SERPL-MCNC: 9.3 MG/DL (ref 8.3–10.1)
CHLORIDE SERPL-SCNC: 104 MMOL/L (ref 96–108)
CO2 SERPL-SCNC: 29 MMOL/L (ref 21–32)
CREAT SERPL-MCNC: 2.4 MG/DL (ref 0.6–1.3)
GFR SERPL CREATININE-BSD FRML MDRD: 24 ML/MIN/1.73SQ M
GLUCOSE P FAST SERPL-MCNC: 139 MG/DL (ref 65–99)
POTASSIUM SERPL-SCNC: 4.5 MMOL/L (ref 3.5–5.3)
SODIUM SERPL-SCNC: 138 MMOL/L (ref 135–147)

## 2022-08-25 PROCEDURE — 80048 BASIC METABOLIC PNL TOTAL CA: CPT

## 2022-08-25 PROCEDURE — 36415 COLL VENOUS BLD VENIPUNCTURE: CPT

## 2022-08-25 NOTE — RESULT ENCOUNTER NOTE
Please inform patient that renal function is stable at creatinine 2 4 mg/dl  This is still within baseline  Continue same treatment and repeat labs as previously ordered before office visit in October

## 2022-08-26 ENCOUNTER — TELEPHONE (OUTPATIENT)
Dept: NEPHROLOGY | Facility: CLINIC | Age: 77
End: 2022-08-26

## 2022-08-26 NOTE — TELEPHONE ENCOUNTER
Lm for the patient advising that blood work is stable, no changes are to be made at this time  - please repeat labs prior to next appointment in October

## 2022-08-26 NOTE — TELEPHONE ENCOUNTER
----- Message from Meek Rubio MD sent at 8/25/2022  7:06 PM EDT -----  Please inform patient that renal function is stable at creatinine 2 4 mg/dl  This is still within baseline  Continue same treatment and repeat labs as previously ordered before office visit in October

## 2022-09-13 ENCOUNTER — HOSPITAL ENCOUNTER (OUTPATIENT)
Dept: RADIOLOGY | Facility: MEDICAL CENTER | Age: 77
Discharge: HOME | End: 2022-09-13
Payer: MEDICARE

## 2022-09-13 DIAGNOSIS — I70.213 ATHEROSCLEROSIS OF NATIVE ARTERIES OF EXTREMITIES WITH INTERMITTENT CLAUDICATION, BILATERAL LEGS (HCC): ICD-10-CM

## 2022-09-13 DIAGNOSIS — I65.23 ASYMPTOMATIC BILATERAL CAROTID ARTERY STENOSIS: ICD-10-CM

## 2022-09-13 PROCEDURE — 93925 LOWER EXTREMITY STUDY: CPT | Performed by: SURGERY

## 2022-09-13 PROCEDURE — 93880 EXTRACRANIAL BILAT STUDY: CPT

## 2022-09-13 PROCEDURE — 93923 UPR/LXTR ART STDY 3+ LVLS: CPT

## 2022-09-13 PROCEDURE — 93880 EXTRACRANIAL BILAT STUDY: CPT | Performed by: SURGERY

## 2022-09-13 PROCEDURE — 93925 LOWER EXTREMITY STUDY: CPT

## 2022-09-13 PROCEDURE — 93922 UPR/L XTREMITY ART 2 LEVELS: CPT | Performed by: SURGERY

## 2022-09-14 ENCOUNTER — TELEPHONE (OUTPATIENT)
Dept: VASCULAR SURGERY | Facility: CLINIC | Age: 77
End: 2022-09-14

## 2022-09-14 NOTE — TELEPHONE ENCOUNTER
----- Message from Zeina Yap sent at 9/14/2022  9:38 AM EDT -----  Call patient for OV with Jael Clayton

## 2022-09-20 ENCOUNTER — OFFICE VISIT (OUTPATIENT)
Dept: CARDIOLOGY CLINIC | Facility: MEDICAL CENTER | Age: 77
End: 2022-09-20
Payer: MEDICARE

## 2022-09-20 VITALS
HEIGHT: 70 IN | OXYGEN SATURATION: 99 % | BODY MASS INDEX: 24.34 KG/M2 | HEART RATE: 54 BPM | DIASTOLIC BLOOD PRESSURE: 60 MMHG | SYSTOLIC BLOOD PRESSURE: 116 MMHG | WEIGHT: 170 LBS

## 2022-09-20 DIAGNOSIS — N18.30 BENIGN HYPERTENSION WITH CHRONIC KIDNEY DISEASE, STAGE III (HCC): ICD-10-CM

## 2022-09-20 DIAGNOSIS — Z95.2 S/P TAVR (TRANSCATHETER AORTIC VALVE REPLACEMENT): ICD-10-CM

## 2022-09-20 DIAGNOSIS — I25.10 CORONARY ARTERY DISEASE INVOLVING NATIVE CORONARY ARTERY OF NATIVE HEART WITHOUT ANGINA PECTORIS: ICD-10-CM

## 2022-09-20 DIAGNOSIS — I70.213 ATHEROSCLEROSIS OF NATIVE ARTERIES OF EXTREMITIES WITH INTERMITTENT CLAUDICATION, BILATERAL LEGS (HCC): ICD-10-CM

## 2022-09-20 DIAGNOSIS — E78.2 MIXED HYPERLIPIDEMIA: ICD-10-CM

## 2022-09-20 DIAGNOSIS — I77.9 AORTO-ILIAC DISEASE (HCC): Chronic | ICD-10-CM

## 2022-09-20 DIAGNOSIS — I65.23 ASYMPTOMATIC BILATERAL CAROTID ARTERY STENOSIS: Primary | Chronic | ICD-10-CM

## 2022-09-20 DIAGNOSIS — I73.9 PVD (PERIPHERAL VASCULAR DISEASE) (HCC): ICD-10-CM

## 2022-09-20 DIAGNOSIS — I12.9 BENIGN HYPERTENSION WITH CHRONIC KIDNEY DISEASE, STAGE III (HCC): ICD-10-CM

## 2022-09-20 DIAGNOSIS — I10 PRIMARY HYPERTENSION: ICD-10-CM

## 2022-09-20 DIAGNOSIS — Z95.1 S/P CABG (CORONARY ARTERY BYPASS GRAFT): Chronic | ICD-10-CM

## 2022-09-20 PROCEDURE — 99214 OFFICE O/P EST MOD 30 MIN: CPT | Performed by: INTERNAL MEDICINE

## 2022-09-20 NOTE — PATIENT INSTRUCTIONS
Recommendations:  1  Continue current medications  2  Await Vascular Surgery appt  3  Await Nephrology appt  4  Follow up in 6 months

## 2022-09-20 NOTE — PROGRESS NOTES
Cardiology   Bozena Michael  76 y o  male MRN: [id number]      Impression:  1  Coronary artery disease s/p CABG - s/p recent PCI of RCA  2  Hypertension - borderline control  3  Severe bilateral carotid disease - followed by vascular surgery  4  Dyslipidemia - doing well  5  Peripheral arterial disease - s/p LLE revascularization  Now with RLE 6  Aortic stenosis - s/p TAVR 6/22  7  Bradycardia - on low-dose b-blockers    8  CKD - GFR 24      Recommendations:  1  Continue current medications  2  Await Vascular Surgery appt  3  Await Nephrology appt  4  Follow up in 6 months  HPI: Bozena Michael  is a 76y o  year old male with coronary artery disease s/p CABG with PCI of RCA 5/22, AS s o TAVR 6/22, HTN, Dyslipidemia and LLE revascularization who returns for follow up  No chest pain, shortness of breath, or palpitations  Stress test 8/22 - no ischemia  Echo 7/20 - normal cardiac function with normal TAVR  Does get dizzy when standing  Does have RLE claudication - has RLE disease  Review of Systems   Constitutional: Negative  HENT: Negative  Eyes: Negative  Respiratory: Negative for chest tightness and shortness of breath  Cardiovascular: Negative for chest pain, palpitations and leg swelling  RLE claudication   Gastrointestinal: Negative  Endocrine: Negative  Genitourinary: Negative  Musculoskeletal: Negative  Skin: Negative  Allergic/Immunologic: Negative  Neurological: Positive for dizziness  Hematological: Negative  Psychiatric/Behavioral: Negative  All other systems reviewed and are negative          Past Medical History:   Diagnosis Date    CAD (coronary artery disease)     Carotid stenosis, asymptomatic, bilateral     Chronic kidney disease     Diabetes ([facility] [address] )     type 2, non-insulin dependent    Diabetes mellitus ([facility] [address] )     GERD (gastroesophageal reflux disease)     History of nephrolithiasis     Hyperlipidemia     Hypertension     [facility]     Severe aortic stenosis      Past Surgical History:   Procedure Laterality Date    APPENDECTOMY      CARDIAC CATHETERIZATION N/A 5/5/2022    Procedure: CARDIAC RHC/LHC; Surgeon: Tracy Jimenez DO;  [facility]; Service: Cardiology    CARDIAC CATHETERIZATION N/A 5/5/2022    Procedure: Cardiac Coronary Angiogram;  Surgeon: Tracy Jimenez DO;  [facility]; Service: Cardiology    CARDIAC CATHETERIZATION N/A 5/24/2022    Procedure: Cardiac pci;  Surgeon: Tate Moran MD;  [facility];   Service: Cardiology    CARDIAC CATHETERIZATION N/A 6/14/2022    Procedure: CARDIAC TAVR;  Surgeon: Ehsan Winslow MD;  [facility];  Service: Cardiology    COLECTOMY      COLONOSCOPY  2013    CORONARY ARTERY BYPASS GRAFT  2013    X 2    FEMORAL ARTERY - POPLITEAL ARTERY BYPASS GRAFT      HEMORRHOID SURGERY      IR AORTAGRAM WITH RUN-OFF  11/19/2018    DE SLCTV CATHJ 3RD+ ORD SLCTV ABDL PEL[facility] 8/12/2016    Procedure: LEFT FEMORAL ARTERIOGRAM; BALLOON ANGIOPLASTY; SFA  AND FEMORAL AT VEIN GRAFT;  Surgeon: Shelby Lombardo MD;  [facility];  Service: Vascular    DE TRANSCATHETER TRANSAPICAL REPLACEMT AORTIC VALVE N/A 6/14/2022    Procedure: REPLACEMENT AORTIC VALVE TRANSCATHETER (TAVR) TRANSAPICAL 29MM IRVIN KYLER S3 ULTRA VALVE(ACCESS ON LEFT) ELANA;  Surgeon: Cathleen Juarez DO;  [facility];  Service: Cardiac Surgery    TONSILLECTOMY AND ADENOIDECTOMY       Social History     Substance and Sexual Activity   Alcohol Use Yes    Comment: rare     Social History     Substance and Sexual Activity   Drug Use No     Social History     Tobacco Use   Smoking Status Current Every Day Smoker    Packs/day: 0 25    Years: 50 00    Pack years: 12 50    Types: Cigarettes   Smokeless Tobacco Never Used   Tobacco Comment    3-4 cigarettes daily     Family History Problem Relation Age of Onset    Heart attack Father     Other Sister         bypass and vlave replacement    Stroke Paternal Uncle     Arrhythmia Neg Hx     Asthma Neg Hx     Clotting disorder Neg Hx     Fainting Neg Hx     Anuerysm Neg Hx     Hypertension Neg Hx         unsure     Hyperlipidemia Neg Hx     Heart failure Neg Hx        Allergies:  No Known Allergies    Medications:     Current Outpatient Medications:     acetaminophen (TYLENOL) 325 mg tablet, Take 3 tablets (975 mg total) by mouth every 8 (eight) hours, Disp: , Rfl: 0    allopurinol (ZYLOPRIM) 100 mg tablet, Take 1 tablet (100 mg total) by mouth daily, Disp: 90 tablet, Rfl: 3    amLODIPine (NORVASC) 10 mg tablet, TAKE 1 TABLET DAILY, Disp: 90 tablet, Rfl: 3    AMYLASE-LIPASE-PROTEASE PO, Take by mouth , Disp: , Rfl:     aspirin (ECOTRIN LOW STRENGTH) 81 mg EC tablet, Take 81 mg by mouth daily, Disp: , Rfl:     atorvastatin (LIPITOR) 80 mg tablet, TAKE 1 TABLET DAILY AT     BEDTIME, Disp: 90 tablet, Rfl: 3    Cholecalciferol (VITAMIN D3 PO), Take by mouth, Disp: , Rfl:     clopidogrel (PLAVIX) 75 mg tablet, Take 1 tablet (75 mg total) by mouth in the morning , Disp: 90 tablet, Rfl: 3    Cyanocobalamin (VITAMIN B12 PO), Take by mouth once a week  , Disp: , Rfl:     docusate sodium (COLACE) 100 mg capsule, Take 1 capsule (100 mg total) by mouth 2 (two) times a day, Disp: , Rfl: 0    lisinopril (ZESTRIL) 10 mg tablet, Take 1 tablet (10 mg total) by mouth daily, Disp: 90 tablet, Rfl: 3    Magnesium Oxide (MAG-200 PO), Take by mouth once a week , Disp: , Rfl:     metoprolol succinate (TOPROL-XL) 25 mg 24 hr tablet, Take 0 5 tablets (12 5 mg total) by mouth daily, Disp: 45 tablet, Rfl: 1    Multiple Vitamin (MULTIVITAMIN ADULT PO), Take by mouth, Disp: , Rfl:     pantoprazole (PROTONIX) 40 mg tablet, TAKE 1 TABLET DAILY, Disp: 90 tablet, Rfl: 1    potassium chloride (K-DUR,KLOR-CON) 20 mEq tablet, Take 1 tablet (20 mEq total) by mouth daily, Disp: 90 tablet, Rfl: 3    torsemide (DEMADEX) 20 mg tablet, Take 1 tablet (20 mg total) by mouth 2 (two) times a day, Disp: 180 tablet, Rfl: 3      Wt Readings from Last 3 Encounters:   09/20/22 77 1 kg (170 lb)   08/23/22 75 3 kg (166 lb)   07/27/22 75 3 kg (166 lb)     Temp Readings from Last 3 Encounters:   07/27/22 (!) 97 2 °F (36 2 °C) (Temporal)   07/20/22 (!) 96 7 °F (35 9 °C) (Tympanic)   07/06/22 (!) 97 2 °F (36 2 °C) (Temporal)     BP Readings from Last 3 Encounters:   09/20/22 116/60   07/27/22 140/62   07/20/22 (!) 162/45     Pulse Readings from Last 3 Encounters:   09/20/22 (!) 54   07/27/22 57   07/20/22 (!) 45         Physical Exam  HENT:      Head: Atraumatic  Mouth/Throat:      Mouth: Mucous membranes are moist    Eyes:      Extraocular Movements: Extraocular movements intact  Cardiovascular:      Rate and Rhythm: Normal rate and regular rhythm  Pulses:           Carotid pulses are 1+ on the right side and 2+ on the left side  Heart sounds: Normal heart sounds  Pulmonary:      Effort: Pulmonary effort is normal       Breath sounds: Normal breath sounds  Abdominal:      General: Abdomen is flat  Musculoskeletal:         General: Normal range of motion  Cervical back: Normal range of motion  Skin:     General: Skin is warm  Neurological:      General: No focal deficit present  Mental Status: He is alert and oriented to person, place, and time     Psychiatric:         Mood and Affect: Mood normal          Behavior: Behavior normal            Laboratory Studies:  CMP:  Lab Results   Component Value Date     11/22/2015    K 4 5 08/25/2022     08/25/2022    CO2 29 08/25/2022    ANIONGAP 7 11/22/2015    BUN 40 (H) 08/25/2022    CREATININE 2 40 (H) 08/25/2022    GLUCOSE 143 (H) 06/14/2022    AST 13 06/08/2022    ALT 18 06/08/2022    EGFR 24 08/25/2022       Lipid Profile:   No results found for: CHOL  Lab Results   Component Value Date    HDL 52 2022     Lab Results   Component Value Date    LDLCALC 33 2022     Lab Results   Component Value Date    TRIG 50 2022       Cardiac testing:   EKG reviewed personally:   Results for orders placed during the hospital encounter of 21    Echo complete with contrast if indicated    Narrative  [address] [facility]  [phone]    Transthoracic Echocardiogram  2D, M-mode, Doppler, and Color Doppler    Study date:  06-May-2021    Patient: Stacey Saini  MR number: [de-identified]  Account number: [de-identified]  : 1945  Age: 64 years  Gender: Male  Status: Outpatient  Location: [place] [address]   Height: 70 in  Weight: 181 lb  BP: 150/ 58 mmHg    Indications: Assess the aortic valve  Diagnoses: I35 0 - Nonrheumatic aortic (valve) stenosis    Sonographer:  Caty Polo RDCS  Primary Physician:  Brenda Bearden MD  Referring Physician:  Angie Perez MD  Group:  [facility]  Interpreting Physician:  Lucille French MD    SUMMARY    LEFT VENTRICLE:  Systolic function was normal  Ejection fraction was estimated to be 60 %  There were no regional wall motion abnormalities  Wall thickness was mildly increased  Doppler parameters were consistent with abnormal left ventricular relaxation (grade 1 diastolic dysfunction)  LEFT ATRIUM:  The atrium was mildly dilated  MITRAL VALVE:  There was mild regurgitation  AORTIC VALVE:  The valve was probably trileaflet  Leaflets exhibited moderately increased thickness and moderate calcification  There was severe stenosis  There was trace regurgitation  Valve peak gradient was 63 mmHg  Valve mean gradient was 32 mmHg  Aortic valve area was 1 cmï¾² by the continuity equation  Estimated aortic valve area (by VTI) was 0 91 cmï¾²  TRICUSPID VALVE:  There was trace regurgitation      HISTORY: PRIOR HISTORY: CAD, bradycardia, DM, dyslipidemia, PAD, CABG,    PROCEDURE: The study was performed in the [address]  This was a routine study  The transthoracic approach was used  The study included complete 2D imaging, M-mode, complete spectral Doppler, and color Doppler  The heart rate was  66 bpm, at the start of the study  Images were obtained from the parasternal, apical, subcostal, and suprasternal notch acoustic windows  Image quality was adequate  LEFT VENTRICLE: Size was normal  Systolic function was normal  Ejection fraction was estimated to be 60 %  There were no regional wall motion abnormalities  Wall thickness was mildly increased  DOPPLER: Doppler parameters were consistent  with abnormal left ventricular relaxation (grade 1 diastolic dysfunction)  RIGHT VENTRICLE: The size was normal  Systolic function was normal  Wall thickness was normal     LEFT ATRIUM: The atrium was mildly dilated  RIGHT ATRIUM: Size was normal     MITRAL VALVE: Valve structure was normal  There was normal leaflet separation  DOPPLER: The transmitral velocity was within the normal range  There was no evidence for stenosis  There was mild regurgitation  AORTIC VALVE: The valve was probably trileaflet  Leaflets exhibited moderately increased thickness and moderate calcification  DOPPLER: There was severe stenosis  There was trace regurgitation  TRICUSPID VALVE: The valve structure was normal  There was normal leaflet separation  DOPPLER: The transtricuspid velocity was within the normal range  There was no evidence for stenosis  There was trace regurgitation  PULMONIC VALVE: Leaflets exhibited normal thickness, no calcification, and normal cuspal separation  DOPPLER: The transpulmonic velocity was within the normal range  There was no significant regurgitation  PERICARDIUM: There was no pericardial effusion  The pericardium was normal in appearance  AORTA: The root exhibited normal size  SYSTEMIC VEINS: IVC: The inferior vena cava was normal in size      MEASUREMENT TABLES    2D MEASUREMENTS  LVOT   (Reference normals)  Diam   23 mm   (--)    DOPPLER MEASUREMENTS  LVOT   (Reference normals)  VTI   24 cm   (--)  R-R interval   1017 ms   (--)  HR   59 bpm   (--)  Stroke vol   99 71 ml   (--)  Cardiac output   5 88 L/min   (--)  Cardiac index   2 94 L/min/mï¾²   (--)  Aortic valve   (Reference normals)  VTI   108 cm   (--)  Peak gradient   63 mmHg   (--)  Mean gradient   32 mmHg   (--)  Valve area, cont   1 cmï¾²   (--)  Obstr index, VTI   0 22    (--)  Valve area, VTI   0 91 cmï¾²   (--)  Area index, VTI   0 46 cmï¾²/mï¾²   (--)    SYSTEM MEASUREMENT TABLES    2D  %FS: 23 52 %  Ao Diam: 3 06 cm  EDV(Teich): 137 67 ml  EF(Teich): 46 6 %  ESV(Teich): 73 51 ml  IVSd: 1 26 cm  LA Area: 24 18 cm2  LA Diam: 4 19 cm  LVEDV MOD A4C: 198 32 ml  LVEF MOD A4C: 54 08 %  LVESV MOD A4C: 91 07 ml  LVIDd: 5 34 cm  LVIDs: 4 08 cm  LVLd A4C: 10 15 cm  LVLs A4C: 8 98 cm  LVOT Diam: 2 43 cm  LVPWd: 1 18 cm  RA Area: 14 06 cm2  RVIDd: 3 75 cm  SV MOD A4C: 107 24 ml  SV(Teich): 64 16 ml    CW  AV Env  Ti: 414 84 ms  AV MaxP 54 mmHg  AV VTI: 103 91 cm  AV Vmax: 3 82 m/s  AV Vmean: 2 5 m/s  AV meanP 08 mmHg    MM  TAPSE: 2 13 cm    PW  LUIS (VTI): 1 06 cm2  LUIS Vmax: 1 04 cm2  E' Sept: 0 04 m/s  E/E' Sept: 22 83  LVOT Env  Ti: 482 08 ms  LVOT VTI: 23 7 cm  LVOT Vmax: 0 86 m/s  LVOT Vmean: 0 5 m/s  LVOT maxP 94 mmHg  LVOT meanP 25 mmHg  LVSV Dopp: 110 25 ml  MV A Pop: 1 23 m/s  MV Dec [place]: 5 05 m/s2  MV DecT: 180 67 ms  MV E Pop: 0 91 m/s  MV E/A Ratio: 0 74  MV PHT: 52 39 ms  MVA By PHT: 4 2 cm2    [employer] Accredited [facility]    Prepared and electronically signed by    Lyndsey Torres MD  Signed 06-May-2021 16:40:31    No results found for this or any previous visit  No results found for this or any previous visit  No results found for this or any previous visit

## 2022-09-21 ENCOUNTER — OFFICE VISIT (OUTPATIENT)
Dept: GASTROENTEROLOGY | Facility: CLINIC | Age: 77
End: 2022-09-21
Payer: MEDICARE

## 2022-09-21 ENCOUNTER — TELEPHONE (OUTPATIENT)
Dept: GASTROENTEROLOGY | Facility: CLINIC | Age: 77
End: 2022-09-21

## 2022-09-21 VITALS — BODY MASS INDEX: 24.62 KG/M2 | HEIGHT: 70 IN | WEIGHT: 172 LBS

## 2022-09-21 DIAGNOSIS — K22.70 BARRETT'S ESOPHAGUS WITHOUT DYSPLASIA: ICD-10-CM

## 2022-09-21 DIAGNOSIS — D50.9 IRON DEFICIENCY ANEMIA, UNSPECIFIED IRON DEFICIENCY ANEMIA TYPE: Primary | ICD-10-CM

## 2022-09-21 DIAGNOSIS — K21.9 GASTROESOPHAGEAL REFLUX DISEASE WITHOUT ESOPHAGITIS: ICD-10-CM

## 2022-09-21 DIAGNOSIS — K86.81 EXOCRINE PANCREATIC INSUFFICIENCY: ICD-10-CM

## 2022-09-21 DIAGNOSIS — Z87.19 HISTORY OF ISCHEMIC COLITIS: ICD-10-CM

## 2022-09-21 PROCEDURE — 99214 OFFICE O/P EST MOD 30 MIN: CPT | Performed by: INTERNAL MEDICINE

## 2022-09-21 NOTE — TELEPHONE ENCOUNTER
Pt had an office visit with Dr Ngozi Jewell and needs to be scheduled for an EGD, however, having other problems and said would call us to schedule  If do not hear from pt in one month, will call him to try to schedule

## 2022-09-21 NOTE — PROGRESS NOTES
[facility] - Outpatient Follow-up Note  Dudley Vivas  76 y o  male MRN: [id number]  Encounter: [id number]          ASSESSMENT AND PLAN:      1  Iron deficiency anemia, unspecified iron deficiency anemia type  No sign of GI bleeding, patient is taking iron supplement, he had a episode of abdominal pain and rectal bleeding last year, colonoscopy was done which confirmed evidence of right-sided ischemic colitis which was managed conservatively, currently denies any bloody bowel movement or diarrhea  He is due for EGD which will schedule it, he is also on Plavix  - EGD; Future    2  Gastroesophageal reflux disease without esophagitis  Continue with pantoprazole 40 mg p o  q day  - EGD; Future    3  Batista's esophagus without dysplasia  Will continue with endoscopic surveillance  - EGD; Future    4  Exocrine pancreatic insufficiency  Continue with Creon supplement 2 tablets before each meal, will check stool for elastase level    5  History of ischemic colitis  Last year colonoscopy was done for bloody diarrhea and found to have multiple ulceration in the right colon, biopsy confirmed evidence of right-sided ischemic colitis, he was managed conservatively with mesalamine and antibiotic, his symptoms improved, currently denies any abdominal pain, no diarrhea or bright red blood per rectum    ______________________________________________________________________    SUBJECTIVE:  Patient seen and examined, he come for follow-up  He currently denies any abdominal pain, no rectal bleeding, he is taking Protonix in the morning, Creon 2 tablets before each meal, denies any diarrhea, his weight remains stable  Last colonoscopy shows multiple ulceration in the right colon near anastomotic site, biopsy confirmed evidence of ischemic colitis, he was managed conservatively  He still has anemia, he is taking iron supplement, his kidney function is slowly declining with CKD stage 4, GFR 24%  He has a history of exocrine pancreatic insufficiency, stool for elastase was very low, and thus recently started on Creon supplement which is helping with diarrhea, bloating and weight loss      REVIEW OF SYSTEMS IS OTHERWISE NEGATIVE  Historical Information   Past Medical History:   Diagnosis Date    CAD (coronary artery disease)     Carotid stenosis, asymptomatic, bilateral     Chronic kidney disease     Colon polyp     Diabetes ([facility] [address] )     type 2, non-insulin dependent    Diabetes mellitus ([facility] [address] )     GERD (gastroesophageal reflux disease)     History of nephrolithiasis     Hyperlipidemia     Hypertension     PAD (peripheral artery disease) ([facility])     Severe aortic stenosis      Past Surgical History:   Procedure Laterality Date    APPENDECTOMY      CARDIAC CATHETERIZATION N/A 5/5/2022    Procedure: CARDIAC RHC/LHC; Surgeon: Madison Dao DO;  [facility]; Service: Cardiology    CARDIAC CATHETERIZATION N/A 5/5/2022    Procedure: Cardiac Coronary Angiogram;  Surgeon: Madison Dao DO;  [facility]; Service: Cardiology    CARDIAC CATHETERIZATION N/A 5/24/2022    Procedure: Cardiac pci;  Surgeon: Yolande Foote MD;  [facility];   Service: Cardiology    CARDIAC CATHETERIZATION N/A 6/14/2022    Procedure: CARDIAC TAVR;  Surgeon: Emily Sena MD;  Location: [facility] MA[facility];  Service: Cardiology    COLECTOMY      COLONOSCOPY  2013    CORONARY ARTERY BYPASS GRAFT  2013    X 2    FEMORAL ARTERY - POPLITEAL ARTERY BYPASS GRAFT      HEMORRHOID SURGERY      IR AORTAGRAM WITH RUN-OFF  11/19/2018    [place] SLCTV CATHJ 3RD+ ORD SLCTV ABDL PEL/LXTR Located [facility] Left 8/12/2016    Procedure: LEFT FEMORAL ARTERIOGRAM; BALLOON ANGIOPLASTY; SFA  AND FEMORAL AT VEIN GRAFT;  Surgeon: Ximena Aleman MD;  Location: [facility] MAIN OR;  Service: Vascular    [place] TRANSCATHETER TRANSAPICAL REPLACEMT AORTIC VALVE N/A 6/14/2022    Procedure: REPLACEMENT AORTIC VALVE TRANSCATHETER (TAVR) TRANSAPICAL 29MM IRVIN KYLER S3 ULTRA VALVE(ACCESS ON LEFT) ELANA;  Surgeon: Doreen Gupta DO;  Location: [facility];  Service: Cardiac Surgery    TONSILLECTOMY AND ADENOIDECTOMY       Social History   Social History     Substance and Sexual Activity   Alcohol Use Yes    Comment: rare     Social History     Substance and Sexual Activity   Drug Use No     Social History     Tobacco Use   Smoking Status Current Every Day Smoker    Packs/day: 0 25    Years: 50 00    Pack years: 12 50    Types: Cigarettes   Smokeless Tobacco Never Used   Tobacco Comment    3-4 cigarettes daily     Family History   Problem Relation Age of Onset    Heart attack Father     Other Sister         bypass and vlave replacement    Stroke Paternal Uncle     Arrhythmia Neg Hx     Asthma Neg Hx     Clotting disorder Neg Hx     Fainting Neg Hx     Anuerysm Neg Hx     Hypertension Neg Hx         unsure     Hyperlipidemia Neg Hx     Heart failure Neg Hx        Meds/Allergies       Current Outpatient Medications:     acetaminophen (TYLENOL) 325 mg tablet    allopurinol (ZYLOPRIM) 100 mg tablet    amLODIPine (NORVASC) 10 mg tablet    AMYLASE-LIPASE-PROTEASE PO    aspirin (ECOTRIN LOW STRENGTH) 81 mg EC tablet    atorvastatin (LIPITOR) 80 mg tablet    Cholecalciferol (VITAMIN D3 PO)    clopidogrel (PLAVIX) 75 mg tablet    Cyanocobalamin (VITAMIN B12 PO)    lisinopril (ZESTRIL) 10 mg tablet    Magnesium Oxide (MAG-200 PO)    metoprolol succinate (TOPROL-XL) 25 mg 24 hr tablet    Multiple Vitamin (MULTIVITAMIN ADULT PO)    pantoprazole (PROTONIX) 40 mg tablet    potassium chloride (K-DUR,KLOR-CON) 20 mEq tablet    torsemide (DEMADEX) 20 mg tablet    docusate sodium (COLACE) 100 mg capsule    No Known Allergies        Objective     Height 5' 10" (1 778 m), weight 78 kg (172 lb)  Body mass index is 24 68 kg/m²        PHYSICAL EXAM:      General Appearance:   Alert, cooperative, no distress   HEENT:   Normocephalic, atraumatic, anicteric      Neck:  Supple, symmetrical, trachea midline   Lungs:   Clear to auscultation bilaterally; no rales, rhonchi or wheezing; respirations unlabored    Heart[de-identified]   Regular rate and rhythm; no murmur, rub, or gallop  Abdomen:   Soft, non-tender, non-distended; normal bowel sounds; no masses, no organomegaly    Genitalia:   Deferred    Rectal:   Deferred    Extremities:  No cyanosis, clubbing or edema    Pulses:  2+ and symmetric    Skin:  No jaundice, rashes, or lesions    Lymph nodes:  No palpable cervical lymphadenopathy        Lab Results:   No visits with results within 1 Day(s) from this visit  Latest known visit with results is:   Lab on 08/25/2022   Component Date Value    Sodium 08/25/2022 138     Potassium 08/25/2022 4 5     Chloride 08/25/2022 104     CO2 08/25/2022 29     ANION GAP 08/25/2022 5     BUN 08/25/2022 40 (A)    Creatinine 08/25/2022 2 40 (A)    Glucose, Fasting 08/25/2022 139 (A)    Calcium 08/25/2022 9 3     eGFR 08/25/2022 24          Radiology Results:   VAS carotid complete study    Result Date: 9/13/2022  Narrative:  [facility] REPORT CLINICAL: Indications:  6 month surveillance of carotid artery disease  Patient is asymptomatic at this time  Operative History: 2018-11-19 Right anterior tibial artery angioplasty 2018-11-19 Right popliteal artery angioplasty 2018-11-19 Right superficial femoral artery angioplasty 2016-08-12 LEFT FEMORAL ARTERIOGRAM; BALLOON ANGIOPLASTY; SFA  AND FEMORAL AT VEIN GRAFT 2015-11-19 Left Femoro_anterior tibial bypass Greater saphenous CABG x 2 with lt gsv harvest Right Hemicolectomy Left CFA endarterectomy Risk Factors: HTN, Hyperlipidemia, PAD, CAD and smoking (current) 0 25 ppd  Clinical Right Pressure:  136/ mm Hg, Left Pressure:  152/ mm Hg  FINDINGS:  Right        Impression  PSV  EDV (cm/s)  Direction of Flow  Ratio  Dist  ICA                132          21                      1 57  Mid   ICA 215          38                      2 57  Prox  ICA    70%+        397          60                      4 74  Dist CCA                  93           0                            Mid CCA                   84           0                      1 35  Prox CCA                  62           0                            Ext Carotid              376           0                      4 49  Prox Vert                 40          11  Antegrade                 Subclavian               158           0                             Left         Impression  PSV  EDV (cm/s)  Direction of Flow  Ratio  Dist  ICA                102          19                      0 89  Mid  ICA                 141          15                      1 24  Prox  ICA    70%+        287          37                      2 52  Dist CCA                 142          15                            Mid CCA                  114          13                      1 26  Prox CCA                  90           0                            Ext Carotid              475          22                      4 18  Prox Vert                119          22  Antegrade                 Subclavian   BE  Severe   298          12                               CONCLUSION:  Impression RIGHT: There is >70% stenosis in the internal carotid artery  Plaque is heterogenous and irregular  There is a severe stenosis in the external carotid artery  Vertebral artery flow is antegrade  There is no significant subclavian artery disease  LEFT: There is >70% stenosis in the internal carotid artery  Plaque is heterogenous and irregular  There is a severe stenosis in the external carotid artery  Vertebral artery flow is antegrade  Monophasic flow in the subclavian artery is suggestive of more proximal disease  Compared to previous study on 1/27/2022, there is no significant change  Recommend repeat testing in 6month as per protocol unless otherwise indicated    SIGNATURE: Electronically Signed by: TAHMINA DANIELS MD,RVT on 2022-09-13 03:27:42 PM    VAS lower limb arterial duplex, complete bilateral    Result Date: 9/13/2022  Narrative:  [facility] REPORT CLINICAL: Indications: Atherosclerosis of native arteries of extremities with intermittent claudication, bilateral legs [I70 213]  6 month surveillance for progression of disease  The patient complains of new onset of right calf cramping after walking short distances, states it is relieved with rest  Operative History: 2018-11-19 Right anterior tibial artery angioplasty 2018-11-19 Right popliteal artery angioplasty 2018-11-19 Right superficial femoral artery angioplasty 2016-08-12 LEFT FEMORAL ARTERIOGRAM; BALLOON ANGIOPLASTY; SFA  AND FEMORAL AT VEIN GRAFT 2015-11-19 Left Femoro_anterior tibial bypass Greater saphenous CABG x 2 with lt gsv harvest Right Hemicolectomy Left CFA endarterectomy Risk Factors: HTN, Hyperlipidemia, PAD, CAD and smoking (current) 0 25 ppd  Clinical Right Pressure:  136/ mm Hg, Left Pressure:  152/ mm Hg  FINDINGS:  Right                  Impression  Waveform    PSV (cm/s)  EDV  Ant  Tibial                        Monophasic                   Common Femoral Artery              Monophasic          78    9  Prox Profunda          50-75%                         294   12  Prox SFA                                              148   13  Mid SFA                >75%                           473   81  Dist SFA               >75%                           370   54  Proximal Pop                                           74   15  Distal Pop                                             57   12  Prox Post Tibial       Occluded                         0    0  Dist Post Tibial       Occluded                         0    0  Prox  Ant  Tibial                                      39   10  Dist  Ant  Tibial                                      28    6   Left                   Impression  Waveform  PSV (cm/s)  EDV  AP (cm)  Ant   Tibial Biphasic                            Inflow Anastomosis                                   67    9           Mid Thigh (Graft)                                    71    9           Low Thigh (Graft)                                    80   10           Near Knee (Graft)      <50%                         142    7           High Calf (Graft)      >75%                         558   24           Outflow Anastomosis                                 124    0           Common Femoral Artery                               156    0      1 8  Prox Profunda          >75%                         230   13           Prox SFA                                             80   10           Prox Post Tibial       Occluded                       0    2           Dist Post Tibial       Occluded                                        Prox  Ant  Tibial                                    87    0           Dist  Ant  Tibial                                    72    5              CONCLUSION:  Impression: RIGHT LOWER LIMB: Monophasic waveforms in the common femoral artery consistent with proximal disease  This resting evaluation shows diffuse atherosclerotic disease in the femoral-popliteal segment with a 50-75% stenosis in the proximal profunda and >75% stenosis in the mid and distal superficial femoral arteries  The posterior tibial artery is occluded  Ankle/Brachial index: 0 42, ischemic range  Prior 0 69  PVR/ PPG tracings are dampened  Metatarsal pressure of 37 mmHg Great toe pressure of 22 mmHg, below the healing range  LEFT LOWER LIMB: The common femoral artery is ectatic in caliber, 1 8 cm  Previously 1 8 cm  There is a <50% stenosis in the common femoral artery, >75% stenosis in the proximal profunda femoral artery and a <50% stenosis in the mid superficial femoral artery  The superficial femoral- anterior tibial artery bypass graft is patent with a >75% stenosis in the distal portion of the graft   Ankle/Brachial index: 0 75, severe claudication range  Prior 0 78  PVR/ PPG tracings are slightly dampened  Metatarsal pressure 206 of mmHg Great toe pressure of 85 mmHg, within the healing range  Compared to previous study on 1/27/2022, there is an increase in the disease process on the right  Recommend repeat testing in 6 months as per protocol unless otherwise indicated    SIGNATURE: Electronically Signed by: Dariel March on 2022-09-13 03:32:37 PM

## 2022-09-22 ENCOUNTER — OFFICE VISIT (OUTPATIENT)
Dept: URGENT CARE | Facility: MEDICAL CENTER | Age: 77
End: 2022-09-22
Payer: MEDICARE

## 2022-09-22 VITALS
BODY MASS INDEX: 24.62 KG/M2 | OXYGEN SATURATION: 99 % | WEIGHT: 172 LBS | HEART RATE: 80 BPM | TEMPERATURE: 98 F | HEIGHT: 70 IN | RESPIRATION RATE: 18 BRPM

## 2022-09-22 DIAGNOSIS — M10.9 ACUTE GOUT INVOLVING TOE OF RIGHT FOOT, UNSPECIFIED CAUSE: Primary | ICD-10-CM

## 2022-09-22 PROCEDURE — G0463 HOSPITAL OUTPT CLINIC VISIT: HCPCS | Performed by: PHYSICIAN ASSISTANT

## 2022-09-22 PROCEDURE — 99213 OFFICE O/P EST LOW 20 MIN: CPT | Performed by: PHYSICIAN ASSISTANT

## 2022-09-22 RX ORDER — PREDNISONE 20 MG/1
20 TABLET ORAL 2 TIMES DAILY WITH MEALS
Qty: 10 TABLET | Refills: 0 | Status: SHIPPED | OUTPATIENT
Start: 2022-09-22 | End: 2022-09-27

## 2022-09-22 NOTE — PROGRESS NOTES
[facility]        NAME: Dudley Vivas  is a 76 y o  male  : 1945    MRN: [id number]  DATE: 2022  TIME: 1:23 PM    Assessment and Plan   Acute gout involving toe of right foot, unspecified cause [M10 9]  1  Acute gout involving toe of right foot, unspecified cause  predniSONE 20 mg tablet         Patient Instructions     1  Take prednisone 20mg  Twice daily x 5 days  2  Tylenol as needed for pain  3  Follow-up with Podiatry ASAP  Chief Complaint     Chief Complaint   Patient presents with    Gout     Patient states he has right big toe pain, redness, swelling; patient states he has recurring gout and podiatry will not see patient; pain with ambulation          History of Present Illness       Juan Luis Monreal is a 40-year-old male presents with pain and swelling of his right great toe that started 3 days prior  Patient reports he has had several gout attacks recently but has been unable to follow-up with Podiatry  He reports he has been on allopurinol for several months with no improvement of symptoms  His recent symptoms started 3 days prior with pain and swelling of his right great toe  Patient reports the pain is increasing in approaching 1010  He is having difficulty with ambulation and movement  Review of Systems   Review of Systems   Constitutional: Negative  HENT: Negative  Respiratory: Negative  Musculoskeletal: Positive for joint swelling  Skin: Positive for color change           Current Medications       Current Outpatient Medications:     predniSONE 20 mg tablet, Take 1 tablet (20 mg total) by mouth 2 (two) times a day with meals for 5 days, Disp: 10 tablet, Rfl: 0    acetaminophen (TYLENOL) 325 mg tablet, Take 3 tablets (975 mg total) by mouth every 8 (eight) hours, Disp: , Rfl: 0    allopurinol (ZYLOPRIM) 100 mg tablet, Take 1 tablet (100 mg total) by mouth daily, Disp: 90 tablet, Rfl: 3    amLODIPine (NORVASC) 10 mg tablet, TAKE 1 TABLET DAILY, Disp: 90 tablet, Rfl: 3    AMYLASE-LIPASE-PROTEASE PO, Take by mouth 2 (two) times a day before meals, Disp: , Rfl:     aspirin (ECOTRIN LOW STRENGTH) 81 mg EC tablet, Take 81 mg by mouth daily, Disp: , Rfl:     atorvastatin (LIPITOR) 80 mg tablet, TAKE 1 TABLET DAILY AT     BEDTIME, Disp: 90 tablet, Rfl: 3    Cholecalciferol (VITAMIN D3 PO), Take by mouth, Disp: , Rfl:     clopidogrel (PLAVIX) 75 mg tablet, Take 1 tablet (75 mg total) by mouth in the morning , Disp: 90 tablet, Rfl: 3    Cyanocobalamin (VITAMIN B12 PO), Take by mouth once a week  , Disp: , Rfl:     docusate sodium (COLACE) 100 mg capsule, Take 1 capsule (100 mg total) by mouth 2 (two) times a day (Patient not taking: Reported on 9/21/2022), Disp: , Rfl: 0    lisinopril (ZESTRIL) 10 mg tablet, Take 1 tablet (10 mg total) by mouth daily, Disp: 90 tablet, Rfl: 3    Magnesium Oxide (MAG-200 PO), Take by mouth once a week , Disp: , Rfl:     metoprolol succinate (TOPROL-XL) 25 mg 24 hr tablet, Take 0 5 tablets (12 5 mg total) by mouth daily, Disp: 45 tablet, Rfl: 1    Multiple Vitamin (MULTIVITAMIN ADULT PO), Take by mouth, Disp: , Rfl:     pantoprazole (PROTONIX) 40 mg tablet, TAKE 1 TABLET DAILY (Patient taking differently: Take 40 mg by mouth daily before breakfast), Disp: 90 tablet, Rfl: 1    potassium chloride (K-DUR,KLOR-CON) 20 mEq tablet, Take 1 tablet (20 mEq total) by mouth daily, Disp: 90 tablet, Rfl: 3    torsemide (DEMADEX) 20 mg tablet, Take 1 tablet (20 mg total) by mouth 2 (two) times a day, Disp: 180 tablet, Rfl: 3    Current Allergies     Allergies as of 09/22/2022    (No Known Allergies)            The following portions of the patient's history were reviewed and updated as appropriate: allergies, current medications, past family history, past medical history, past social history, past surgical history and problem list      Past Medical History:   Diagnosis Date    CAD (coronary artery disease)     Carotid stenosis, asymptomatic, bilateral     Chronic kidney disease     Colon polyp     Diabetes ([facility] [address] )     type 2, non-insulin dependent    Diabetes mellitus ([facility] [address] )     GERD (gastroesophageal reflux disease)     History of nephrolithiasis     Hyperlipidemia     Hypertension     [facility]     Severe aortic stenosis        Past Surgical History:   Procedure Laterality Date    APPENDECTOMY      CARDIAC CATHETERIZATION N/A 5/5/2022    Procedure: CARDIAC RHC/LHC; Surgeon: Trudi Murcia DO;  [facility]; Service: Cardiology    CARDIAC CATHETERIZATION N/A 5/5/2022    Procedure: Cardiac Coronary Angiogram;  Surgeon: Trudi Murcia DO;  [facility]; Service: Cardiology    CARDIAC CATHETERIZATION N/A 5/24/2022    Procedure: Cardiac pci;  Surgeon: Olvin Peterson MD;  Location: [facility];   Service: Cardiology    CARDIAC CATHETERIZATION N/A 6/14/2022    Procedure: CARDIAC TAVR;  Surgeon: Rosario Lund MD;  Location: [facility];  Service: Cardiology    COLECTOMY      COLONOSCOPY  2013    CORONARY ARTERY BYPASS GRAFT  2013    X 2    FEMORAL ARTERY - POPLITEAL ARTERY BYPASS GRAFT      HEMORRHOID SURGERY      IR AORTAGRAM WITH RUN-OFF  11/19/2018    [place] SLCTV CATHJ 3RD+ ORD SLCTV ABDL PEL/LXTR [facility] Left 8/12/2016    Procedure: LEFT FEMORAL ARTERIOGRAM; BALLOON ANGIOPLASTY; SFA  AND FEMORAL AT VEIN GRAFT;  Surgeon: Gavin Miller MD;  Location: [facility];  Service: Vascular    [place] TRANSCATHETER TRANSAPICAL REPLACEMT AORTIC VALVE N/A 6/14/2022    Procedure: REPLACEMENT AORTIC VALVE TRANSCATHETER (TAVR) TRANSAPICAL 29MM IRVIN KYLER S3 ULTRA VALVE(ACCESS ON LEFT) ELANA;  Surgeon: Zoe Medina DO;  Location: [facility];  Service: Cardiac Surgery    TONSILLECTOMY AND ADENOIDECTOMY         Family History   Problem Relation Age of Onset    Heart attack Father     Other Sister         bypass and vlave replacement    Stroke Paternal Uncle     Arrhythmia Neg Hx     Asthma Neg Hx     Clotting disorder Neg Hx     Fainting Neg Hx     Anuerysm Neg Hx     Hypertension Neg Hx         unsure     Hyperlipidemia Neg Hx     Heart failure Neg Hx          Medications have been verified  Objective   Pulse 80   Temp 98 °F (36 7 °C) (Temporal)   Resp 18   Ht 5' 10" (1 778 m)   Wt 78 kg (172 lb)   SpO2 99%   BMI 24 68 kg/m²   No LMP for male patient  Physical Exam     Physical Exam  Constitutional:       General: He is not in acute distress  Appearance: Normal appearance  He is not ill-appearing  Cardiovascular:      Rate and Rhythm: Normal rate and regular rhythm  Heart sounds: Normal heart sounds  No murmur heard  Pulmonary:      Effort: Pulmonary effort is normal       Breath sounds: Normal breath sounds  Feet:      Comments: Diffuse swelling, erythema and tenderness to touch over the right great toe MP joint  No palpable crepitus or deformity  Neurological:      Mental Status: He is alert

## 2022-09-22 NOTE — PATIENT INSTRUCTIONS
1  Take prednisone 20mg  Twice daily x 5 days  2  Tylenol as needed for pain  3  Follow-up with Podiatry ASAP

## 2022-09-26 DIAGNOSIS — Z95.2 S/P TAVR (TRANSCATHETER AORTIC VALVE REPLACEMENT): ICD-10-CM

## 2022-09-26 DIAGNOSIS — I50.32 CHRONIC HEART FAILURE WITH PRESERVED EJECTION FRACTION (HCC): ICD-10-CM

## 2022-09-26 RX ORDER — POTASSIUM CHLORIDE 20 MEQ/1
20 TABLET, EXTENDED RELEASE ORAL DAILY
Qty: 90 TABLET | Refills: 3 | Status: SHIPPED | OUTPATIENT
Start: 2022-09-26 | End: 2022-10-11

## 2022-09-26 RX ORDER — METOPROLOL SUCCINATE 25 MG/1
12.5 TABLET, EXTENDED RELEASE ORAL DAILY
Qty: 45 TABLET | Refills: 1 | Status: SHIPPED | OUTPATIENT
Start: 2022-09-26

## 2022-09-26 RX ORDER — TORSEMIDE 20 MG/1
20 TABLET ORAL 2 TIMES DAILY
Qty: 180 TABLET | Refills: 3 | Status: SHIPPED | OUTPATIENT
Start: 2022-09-26

## 2022-09-29 ENCOUNTER — TELEPHONE (OUTPATIENT)
Dept: OTHER | Facility: HOSPITAL | Age: 77
End: 2022-09-29

## 2022-09-29 ENCOUNTER — APPOINTMENT (OUTPATIENT)
Dept: LAB | Facility: MEDICAL CENTER | Age: 77
End: 2022-09-29
Payer: MEDICARE

## 2022-09-29 DIAGNOSIS — R80.1 PERSISTENT PROTEINURIA, UNSPECIFIED: ICD-10-CM

## 2022-09-29 DIAGNOSIS — N17.9 ACUTE KIDNEY INJURY (HCC): ICD-10-CM

## 2022-09-29 DIAGNOSIS — N18.4 BENIGN HYPERTENSION WITH CKD (CHRONIC KIDNEY DISEASE) STAGE IV (HCC): ICD-10-CM

## 2022-09-29 DIAGNOSIS — I12.9 BENIGN HYPERTENSION WITH CKD (CHRONIC KIDNEY DISEASE) STAGE IV (HCC): ICD-10-CM

## 2022-09-29 DIAGNOSIS — N25.81 SECONDARY HYPERPARATHYROIDISM OF RENAL ORIGIN (HCC): ICD-10-CM

## 2022-09-29 DIAGNOSIS — I50.32 CHRONIC DIASTOLIC CHF (CONGESTIVE HEART FAILURE) (HCC): ICD-10-CM

## 2022-09-29 DIAGNOSIS — N18.4 STAGE 4 CHRONIC KIDNEY DISEASE (HCC): ICD-10-CM

## 2022-09-29 DIAGNOSIS — N18.4 STAGE 4 CHRONIC KIDNEY DISEASE (HCC): Primary | ICD-10-CM

## 2022-09-29 LAB
25(OH)D3 SERPL-MCNC: 34.1 NG/ML (ref 30–100)
ANION GAP SERPL CALCULATED.3IONS-SCNC: 6 MMOL/L (ref 4–13)
BACTERIA UR QL AUTO: ABNORMAL /HPF
BILIRUB UR QL STRIP: NEGATIVE
BUN SERPL-MCNC: 73 MG/DL (ref 5–25)
CALCIUM SERPL-MCNC: 8.5 MG/DL (ref 8.3–10.1)
CHLORIDE SERPL-SCNC: 108 MMOL/L (ref 96–108)
CLARITY UR: CLEAR
CO2 SERPL-SCNC: 26 MMOL/L (ref 21–32)
COLOR UR: YELLOW
CREAT SERPL-MCNC: 3.04 MG/DL (ref 0.6–1.3)
CREAT UR-MCNC: 151 MG/DL
ERYTHROCYTE [DISTWIDTH] IN BLOOD BY AUTOMATED COUNT: 16.8 % (ref 11.6–15.1)
GFR SERPL CREATININE-BSD FRML MDRD: 18 ML/MIN/1.73SQ M
GLUCOSE SERPL-MCNC: 123 MG/DL (ref 65–140)
GLUCOSE UR STRIP-MCNC: NEGATIVE MG/DL
HCT VFR BLD AUTO: 39.3 % (ref 36.5–49.3)
HGB BLD-MCNC: 12.4 G/DL (ref 12–17)
HGB UR QL STRIP.AUTO: NEGATIVE
HYALINE CASTS #/AREA URNS LPF: ABNORMAL /LPF
KETONES UR STRIP-MCNC: NEGATIVE MG/DL
LEUKOCYTE ESTERASE UR QL STRIP: NEGATIVE
MAGNESIUM SERPL-MCNC: 2.5 MG/DL (ref 1.6–2.6)
MCH RBC QN AUTO: 28.4 PG (ref 26.8–34.3)
MCHC RBC AUTO-ENTMCNC: 31.6 G/DL (ref 31.4–37.4)
MCV RBC AUTO: 90 FL (ref 82–98)
MUCOUS THREADS UR QL AUTO: ABNORMAL
NITRITE UR QL STRIP: NEGATIVE
NON-SQ EPI CELLS URNS QL MICRO: ABNORMAL /HPF
PH UR STRIP.AUTO: 5.5 [PH]
PHOSPHATE SERPL-MCNC: 3.9 MG/DL (ref 2.3–4.1)
PLATELET # BLD AUTO: 217 THOUSANDS/UL (ref 149–390)
PMV BLD AUTO: 11.3 FL (ref 8.9–12.7)
POTASSIUM SERPL-SCNC: 4.1 MMOL/L (ref 3.5–5.3)
PROT UR STRIP-MCNC: ABNORMAL MG/DL
PROT UR-MCNC: 28 MG/DL
PROT/CREAT UR: 0.19 MG/G{CREAT} (ref 0–0.1)
PTH-INTACT SERPL-MCNC: 225.3 PG/ML (ref 18.4–80.1)
RBC # BLD AUTO: 4.37 MILLION/UL (ref 3.88–5.62)
RBC #/AREA URNS AUTO: ABNORMAL /HPF
SODIUM SERPL-SCNC: 140 MMOL/L (ref 135–147)
SP GR UR STRIP.AUTO: 1.02 (ref 1–1.03)
URATE SERPL-MCNC: 7.7 MG/DL (ref 3.5–8.5)
UROBILINOGEN UR STRIP-ACNC: <2 MG/DL
WBC # BLD AUTO: 9.21 THOUSAND/UL (ref 4.31–10.16)
WBC #/AREA URNS AUTO: ABNORMAL /HPF

## 2022-09-29 PROCEDURE — 83970 ASSAY OF PARATHORMONE: CPT

## 2022-09-29 PROCEDURE — 83735 ASSAY OF MAGNESIUM: CPT

## 2022-09-29 PROCEDURE — 82306 VITAMIN D 25 HYDROXY: CPT

## 2022-09-29 PROCEDURE — 84156 ASSAY OF PROTEIN URINE: CPT

## 2022-09-29 PROCEDURE — 85027 COMPLETE CBC AUTOMATED: CPT

## 2022-09-29 PROCEDURE — 84550 ASSAY OF BLOOD/URIC ACID: CPT

## 2022-09-29 PROCEDURE — 81001 URINALYSIS AUTO W/SCOPE: CPT

## 2022-09-29 PROCEDURE — 82570 ASSAY OF URINE CREATININE: CPT

## 2022-09-29 PROCEDURE — 80048 BASIC METABOLIC PNL TOTAL CA: CPT

## 2022-09-29 PROCEDURE — 84100 ASSAY OF PHOSPHORUS: CPT

## 2022-09-29 PROCEDURE — 36415 COLL VENOUS BLD VENIPUNCTURE: CPT

## 2022-09-29 NOTE — TELEPHONE ENCOUNTER
Left message with patient advising:Please inform patient that creatinine trended up to 3 0   If he does not have any lower extremity edema recommend decreasing torsemide to 20 mg daily instead of 20 mg twice daily   Please ask him to go for BMP in 1 week, already ordered  Will Abreu discuss further during office visit on 10/07 with Margaret

## 2022-09-29 NOTE — TELEPHONE ENCOUNTER
Known ckd 4 recently cr 2 1-2 4   NOT ABLE TO REACH PATIENT ON THE PHONE ON MULTIPLE  ATTEMPTS    -renal function was acceptable at creatinine 2 4 on 08/25 but worsened to creatinine 3 0 on 09/29  Called patient to discuss if he is doing okay  Possibility of volume depletion versus postrenal cause  UA without any RBC or WBC so less likely GN  -reviewed cardiology visit note from 09/20, it appears patient did not have any edema    Due to worsening renal function will decrease torsemide to 20 mg daily and check BMP in 1 week    -ALSO NOTICED PATIENT HAD A RECENT GOUT FLARE WHICH IS BEING TREATED WITH PREDNISONE    -also PTH trending up but will discuss during office visit on 10/07

## 2022-09-30 ENCOUNTER — TELEPHONE (OUTPATIENT)
Dept: NEPHROLOGY | Facility: CLINIC | Age: 77
End: 2022-09-30

## 2022-10-04 ENCOUNTER — HOSPITAL ENCOUNTER (EMERGENCY)
Facility: HOSPITAL | Age: 77
Discharge: HOME | End: 2022-10-04
Attending: EMERGENCY MEDICINE
Payer: MEDICARE

## 2022-10-04 ENCOUNTER — HOSPITAL ENCOUNTER (OUTPATIENT)
Dept: RADIOLOGY | Facility: MEDICAL CENTER | Age: 77
Discharge: HOME | End: 2022-10-04
Payer: MEDICARE

## 2022-10-04 VITALS
RESPIRATION RATE: 16 BRPM | SYSTOLIC BLOOD PRESSURE: 164 MMHG | DIASTOLIC BLOOD PRESSURE: 48 MMHG | BODY MASS INDEX: 24.68 KG/M2 | WEIGHT: 172 LBS | OXYGEN SATURATION: 100 % | HEART RATE: 56 BPM | TEMPERATURE: 98.2 F

## 2022-10-04 DIAGNOSIS — R91.1 LUNG NODULE: ICD-10-CM

## 2022-10-04 DIAGNOSIS — I10 ASYMPTOMATIC HYPERTENSION: ICD-10-CM

## 2022-10-04 DIAGNOSIS — S00.412A ABRASION OF LEFT EAR, INITIAL ENCOUNTER: Primary | ICD-10-CM

## 2022-10-04 PROCEDURE — 99284 EMERGENCY DEPT VISIT MOD MDM: CPT | Performed by: PHYSICIAN ASSISTANT

## 2022-10-04 PROCEDURE — 99282 EMERGENCY DEPT VISIT SF MDM: CPT

## 2022-10-04 PROCEDURE — 71250 CT THORAX DX C-: CPT

## 2022-10-04 RX ADMIN — SILVER NITRATE APPLICATORS 1 APPLICATOR: 25; 75 STICK TOPICAL at 14:30

## 2022-10-04 NOTE — ED PROVIDER NOTES
History  Chief Complaint   Patient presents with    Ear Problem     Picked at "a little white thing" on left ear and it started to bleed  Had difficulty stopping bleeding at home     This is a 66-year-old male with past medical history significant for hypertension presenting to the emergency department today for bleeding from his ear  The patient notes that he picked a white scab to the helix of his left ear  The patient takes antiplatelets at home; he notes the wound has been slowly oozing for approximately 45 minutes  He denies any bleeding from the external auditory canal itself  He denies any dizziness, lightheadedness, or visual disturbances  No nausea or vomiting  No difficulty hearing  The patient denies other complaints at this time  History provided by:  Patient   used: No    Medical Problem  Location:  [facility]  Severity:  Mild  Onset quality:  Sudden  Duration:  45 minutes  Timing:  Constant  Progression:  Unchanged  Chronicity:  New  Relieved by:  Pressure  Worsened by:  Nothing  Associated symptoms: no abdominal pain, no chest pain, no congestion, no cough, no diarrhea, no ear pain, no fatigue, no fever, no headaches, no loss of consciousness, no myalgias, no nausea, no rash, no rhinorrhea, no shortness of breath, no sore throat, no vomiting and no wheezing        Prior to Admission Medications   Prescriptions Last Dose Informant Patient Reported? Taking?    AMYLASE-LIPASE-PROTEASE PO  Self Yes No   Sig: Take by mouth 2 (two) times a day before meals   Cholecalciferol (VITAMIN D3 PO)  Self Yes No   Sig: Take by mouth   Cyanocobalamin (VITAMIN B12 PO)  Self Yes No   Sig: Take by mouth once a week     Magnesium Oxide (MAG-200 PO)  Self Yes No   Sig: Take by mouth once a week    Multiple Vitamin (MULTIVITAMIN ADULT PO)  Self Yes No   Sig: Take by mouth   acetaminophen (TYLENOL) 325 mg tablet  Self No No   Sig: Take 3 tablets (975 mg total) by mouth every 8 (eight) hours   allopurinol (ZYLOPRIM) 100 mg tablet  Self No No   Sig: Take 1 tablet (100 mg total) by mouth daily   amLODIPine (NORVASC) 10 mg tablet  Self No No   Sig: TAKE 1 TABLET DAILY   aspirin (ECOTRIN LOW STRENGTH) 81 mg EC tablet  Self Yes No   Sig: Take 81 mg by mouth daily   atorvastatin (LIPITOR) 80 mg tablet  Self No No   Sig: TAKE 1 TABLET DAILY AT     BEDTIME   clopidogrel (PLAVIX) 75 mg tablet  Self No No   Sig: Take 1 tablet (75 mg total) by mouth in the morning  docusate sodium (COLACE) 100 mg capsule  Self No No   Sig: Take 1 capsule (100 mg total) by mouth 2 (two) times a day   Patient not taking: Reported on 9/21/2022   lisinopril (ZESTRIL) 10 mg tablet  Self No No   Sig: Take 1 tablet (10 mg total) by mouth daily   metoprolol succinate (TOPROL-XL) 25 mg 24 hr tablet   No No   Sig: Take 0 5 tablets (12 5 mg total) by mouth daily   pantoprazole (PROTONIX) 40 mg tablet  Self No No   Sig: TAKE 1 TABLET DAILY   Patient taking differently: Take 40 mg by mouth daily before breakfast   potassium chloride (K-DUR,KLOR-CON) 20 mEq tablet   No No   Sig: Take 1 tablet (20 mEq total) by mouth daily   torsemide (DEMADEX) 20 mg tablet   No No   Sig: Take 1 tablet (20 mg total) by mouth 2 (two) times a day      Facility-Administered Medications: None       Past Medical History:   Diagnosis Date    CAD (coronary artery disease)     Carotid stenosis, asymptomatic, bilateral     Chronic kidney disease     Colon polyp     Diabetes ([facility] [address] )     type 2, non-insulin dependent    Diabetes mellitus ([facility] [address] )     GERD (gastroesophageal reflux disease)     History of nephrolithiasis     Hyperlipidemia     Hypertension     [facility]peripheral artery [facility]     Severe aortic stenosis        Past Surgical History:   Procedure Laterality Date    APPENDECTOMY      CARDIAC CATHETERIZATION N/A 5/5/2022    Procedure: CARDIAC RHC/LHC; Surgeon: Haile Duval DO;  [facility];   Service: Cardiology    CARDIAC CATHETERIZATION N/A 5/5/2022    Procedure: Cardiac Coronary Angiogram;  Surgeon: Tracy Jimenez DO;  [facility]; Service: Cardiology    CARDIAC CATHETERIZATION N/A 5/24/2022    Procedure: Cardiac pci;  Surgeon: Tate Moran MD;  [facility]; Service: Cardiology    CARDIAC CATHETERIZATION N/A 6/14/2022    Procedure: CARDIAC TAVR;  Surgeon: Ehsan Winslow MD;  Location[facility];  Service: Cardiology    COLECTOMY      COLONOSCOPY  2013    CORONARY ARTERY BYPASS GRAFT  2013    X 2    FEMORAL ARTERY - POPLITEAL ARTERY BYPASS GRAFT      HEMORRHOID SURGERY      IR AORTAGRAM WITH RUN-OFF  11/19/2018    VT SLCTV CATHJ 3RD+ ORD SLCTV ABDL PEL/LXTR [facility] Left 8/12/2016    Procedure: LEFT FEMORAL ARTERIOGRAM; BALLOON ANGIOPLASTY; SFA  AND FEMORAL AT VEIN GRAFT;  Surgeon: Shelby Lombardo MD;  [facility];  Service: Vascular    VT TRANSCATHETER TRANSAPICAL REPLACEMT AORTIC VALVE N/A 6/14/2022    Procedure: REPLACEMENT AORTIC VALVE TRANSCATHETER (TAVR) TRANSAPICAL 29MM IRVIN KYLER S3 ULTRA VALVE(ACCESS ON LEFT) ELANA;  Surgeon: Cathleen Juarez DO;  [facility];  Service: Cardiac Surgery    TONSILLECTOMY AND ADENOIDECTOMY         Family History   Problem Relation Age of Onset    Heart attack Father     Other Sister         bypass and vlave replacement    Stroke Paternal Uncle     Arrhythmia Neg Hx     Asthma Neg Hx     Clotting disorder Neg Hx     Fainting Neg Hx     Anuerysm Neg Hx     Hypertension Neg Hx         unsure     Hyperlipidemia Neg Hx     Heart failure Neg Hx      I have reviewed and agree with the history as documented      E-Cigarette/Vaping    E-Cigarette Use Never User      E-Cigarette/Vaping Substances    Nicotine No     THC No     CBD No     Flavoring No      Social History     Tobacco Use    Smoking status: Current Every Day Smoker     Packs/day: 0 25     Years: 50 00     Pack years: 12 50 Types: Cigarettes    Smokeless tobacco: Never Used    Tobacco comment: 3-4 cigarettes daily   Vaping Use    Vaping Use: Never used   Substance Use Topics    Alcohol use: Yes     Comment: rare    Drug use: No       Review of Systems   Constitutional: Negative for appetite change, chills, diaphoresis, fatigue and fever  HENT: Negative for congestion, ear discharge, ear pain, rhinorrhea and sore throat  Eyes: Negative for visual disturbance  Respiratory: Negative for cough, chest tightness, shortness of breath and wheezing  Cardiovascular: Negative for chest pain and palpitations  Gastrointestinal: Negative for abdominal pain, constipation, diarrhea, nausea and vomiting  Musculoskeletal: Negative for back pain and myalgias  Skin: Positive for wound  Negative for rash  Neurological: Negative for dizziness, seizures, loss of consciousness, syncope, weakness, light-headedness, numbness and headaches  Psychiatric/Behavioral: Negative for confusion  All other systems reviewed and are negative  Physical Exam  Physical Exam  Vitals and nursing note reviewed  Constitutional:       General: He is not in acute distress  Appearance: Normal appearance  He is normal weight  He is not ill-appearing, toxic-appearing or diaphoretic  HENT:      Head: Normocephalic and atraumatic  Right Ear: Tympanic membrane, ear canal and external ear normal  There is no impacted cerumen  Left Ear: Tympanic membrane and ear canal normal  There is no impacted cerumen  Ears:        Comments: Punctate area of bleeding to the helix of the left ear; slowly oozing blood     Nose: Nose normal  No congestion or rhinorrhea  Mouth/Throat:      Mouth: Mucous membranes are moist       Pharynx: No oropharyngeal exudate or posterior oropharyngeal erythema  Eyes:      General: No scleral icterus  Right eye: No discharge  Left eye: No discharge        Conjunctiva/sclera: Conjunctivae normal  Cardiovascular:      Rate and Rhythm: Normal rate and regular rhythm  Pulses: Normal pulses  Heart sounds: Normal heart sounds  No murmur heard  No friction rub  No gallop  Pulmonary:      Effort: Pulmonary effort is normal  No respiratory distress  Breath sounds: Normal breath sounds  No stridor  No wheezing, rhonchi or rales  Chest:      Chest wall: No tenderness  Musculoskeletal:         General: Normal range of motion  Cervical back: Normal range of motion  No rigidity  Right lower leg: No edema  Left lower leg: No edema  Skin:     General: Skin is warm and dry  Capillary Refill: Capillary refill takes less than 2 seconds  Coloration: Skin is not jaundiced or pale  Comments: Small area of bleeding on helix of ear as noted above   Neurological:      General: No focal deficit present  Mental Status: He is alert and oriented to person, place, and time  Mental status is at baseline     Psychiatric:         Mood and Affect: Mood normal          Behavior: Behavior normal          Vital Signs  ED Triage Vitals [10/04/22 1327]   Temperature Pulse Respirations Blood Pressure SpO2   98 2 °F (36 8 °C) 58 16 (!) 170/39 100 %      Temp src Heart Rate Source Patient Position - Orthostatic VS BP Location FiO2 (%)   -- Monitor Lying Right arm --      Pain Score       No Pain           Vitals:    10/04/22 1327 10/04/22 1427   BP: (!) 170/39 (!) 164/48   Pulse: 58 56   Patient Position - Orthostatic VS: Lying Lying         Visual Acuity      ED Medications  Medications   silver nitrate-potassium nitrate (ARZOL SILVER NITRATE) 77-62 % applicator 1 applicator (1 applicator Topical Given 10/4/22 1430)       Diagnostic Studies  Results Reviewed     None                 No orders to display              Procedures  Procedures         ED Course                                             MDM  Number of Diagnoses or Management Options  Abrasion of left ear, initial encounter: new and requires workup  Asymptomatic hypertension: new and requires workup  Diagnosis management comments: 56-year-old male presenting to the emergency department today for a small area of bleeding to the helix of his left ear  He picked a white scabbed now it is oozing blood for approximately 45 minutes  Takes antiplatelets at home  Vital signs are stable  Hemostasis achieved with silver nitrate cautery  The patient was then observed here in the emergency department for half an hour with no episodes of rebleeding  The patient is stable for discharge at this time  Wound care instructions were given  Strict return precautions were given  Recommend PCP follow-up as soon as possible  The patient and/or patient's proxy verify their understanding and agree to the plan at this time  All questions answered to the patient and/or their proxy's satisfaction  All labs reviewed and utilized in the medical decision making process  All radiology studies independently viewed by me and interpreted by the radiologist  Portions of the record may have been created with voice recognition software   Occasional wrong word or "sound a like" substitutions may have occurred due to the inherent limitations of voice recognition software   Read the chart carefully and recognize, using context, where substitutions have occurred         Amount and/or Complexity of Data Reviewed  Review and summarize past medical records: yes    Patient Progress  Patient progress: stable      Disposition  Final diagnoses:   Abrasion of left ear, initial encounter   Asymptomatic hypertension     Time reflects when diagnosis was documented in both MDM as applicable and the Disposition within this note     Time User Action Codes Description Comment    10/4/2022  2:16 PM Lenin Peña Add [S00 412A] Abrasion of left ear, initial encounter     10/4/2022  2:22 PM Lenin Peña Add [I10] Asymptomatic hypertension       ED Disposition     ED Disposition   Discharge    Condition   Stable    Date/Time   Tue Oct 4, 2022  2:16 PM    Comment   Misha Queen  discharge to home/self care                 Follow-up Information     Follow up With Specialties Details Why Contact Info Additional Information    Chidi Pinto MD Family Medicine Schedule an appointment as soon as possible for a visit   [address] [facility] [address] [facility] [address] Emergency Department Emergency Medicine Go to  If symptoms worsen [address] [facility],[address] [id number]  [address] [facility] Emergency Department, [address], [address] [facility] [address]          Discharge Medication List as of 10/4/2022  2:22 PM      CONTINUE these medications which have NOT CHANGED    Details   acetaminophen (TYLENOL) 325 mg tablet Take 3 tablets (975 mg total) by mouth every 8 (eight) hours, Starting Fri 6/17/2022, OTC      allopurinol (ZYLOPRIM) 100 mg tablet Take 1 tablet (100 mg total) by mouth daily, Starting Fri 5/27/2022, Normal      amLODIPine (NORVASC) 10 mg tablet TAKE 1 TABLET DAILY, Normal      AMYLASE-LIPASE-PROTEASE PO Take by mouth 2 (two) times a day before meals, Historical Med      aspirin (ECOTRIN LOW STRENGTH) 81 mg EC tablet Take 81 mg by mouth daily, Historical Med      atorvastatin (LIPITOR) 80 mg tablet TAKE 1 TABLET DAILY AT     BEDTIME, Normal      Cholecalciferol (VITAMIN D3 PO) Take by mouth, Historical Med      clopidogrel (PLAVIX) 75 mg tablet Take 1 tablet (75 mg total) by mouth in the morning , Starting Wed 5/25/2022, Normal      Cyanocobalamin (VITAMIN B12 PO) Take by mouth once a week  , Historical Med      docusate sodium (COLACE) 100 mg capsule Take 1 capsule (100 mg total) by mouth 2 (two) times a day, Starting Fri 6/17/2022, OTC      lisinopril (ZESTRIL) 10 mg tablet Take 1 tablet (10 mg total) by mouth daily, Starting Mon 7/18/2022, Normal      Magnesium Oxide (MAG-200 PO) Take by mouth once a week , Historical Med      metoprolol succinate (TOPROL-XL) 25 mg 24 hr tablet Take 0 5 tablets (12 5 mg total) by mouth daily, Starting Mon 9/26/2022, Normal      Multiple Vitamin (MULTIVITAMIN ADULT PO) Take by mouth, Historical Med      pantoprazole (PROTONIX) 40 mg tablet TAKE 1 TABLET DAILY, Normal      potassium chloride (K-DUR,KLOR-CON) 20 mEq tablet Take 1 tablet (20 mEq total) by mouth daily, Starting Mon 9/26/2022, Normal      torsemide (DEMADEX) 20 mg tablet Take 1 tablet (20 mg total) by mouth 2 (two) times a day, Starting Mon 9/26/2022, Normal             No discharge procedures on file      PDMP Review       Value Time User    PDMP Reviewed  Yes 6/17/2022 12:05 PM Kavya Laura PA-C          ED Provider  Electronically Signed by           Saundra Jones PA-C  10/04/22 8973 Patient/Collateral...

## 2022-10-04 NOTE — DISCHARGE INSTRUCTIONS
Please return to the emergency department for worsening symptoms including chest pain, shortness of breath, dizziness, lightheadedness, fever greater than 103, severe pain, inability to walk, fainting episodes, etc  Please follow-up with your family practice provider as soon as possible  Please keep the wound clean and dry  Please do not pick at the wound  If the wound continues to bleed, please hold pressure on the wound for at least 10 minutes  This should help stop bleeding

## 2022-10-07 ENCOUNTER — OFFICE VISIT (OUTPATIENT)
Dept: NEPHROLOGY | Facility: CLINIC | Age: 77
End: 2022-10-07
Payer: MEDICARE

## 2022-10-07 VITALS
SYSTOLIC BLOOD PRESSURE: 126 MMHG | DIASTOLIC BLOOD PRESSURE: 50 MMHG | HEIGHT: 70 IN | BODY MASS INDEX: 24.62 KG/M2 | WEIGHT: 172 LBS

## 2022-10-07 DIAGNOSIS — E83.42 HYPOMAGNESEMIA: ICD-10-CM

## 2022-10-07 DIAGNOSIS — F17.219 CIGARETTE NICOTINE DEPENDENCE WITH NICOTINE-INDUCED DISORDER: ICD-10-CM

## 2022-10-07 DIAGNOSIS — R80.1 PERSISTENT PROTEINURIA, UNSPECIFIED: ICD-10-CM

## 2022-10-07 DIAGNOSIS — E11.22 CONTROLLED TYPE 2 DIABETES MELLITUS WITH STAGE 4 CHRONIC KIDNEY DISEASE, WITHOUT LONG-TERM CURRENT USE OF INSULIN (HCC): ICD-10-CM

## 2022-10-07 DIAGNOSIS — N18.30 BENIGN HYPERTENSION WITH CHRONIC KIDNEY DISEASE, STAGE III (HCC): ICD-10-CM

## 2022-10-07 DIAGNOSIS — I12.9 BENIGN HYPERTENSION WITH CHRONIC KIDNEY DISEASE, STAGE III (HCC): ICD-10-CM

## 2022-10-07 DIAGNOSIS — E78.2 MIXED HYPERLIPIDEMIA: ICD-10-CM

## 2022-10-07 DIAGNOSIS — N28.1 RENAL CYST, LEFT: ICD-10-CM

## 2022-10-07 DIAGNOSIS — N18.9 ACUTE KIDNEY INJURY SUPERIMPOSED ON CHRONIC KIDNEY DISEASE (HCC): Primary | ICD-10-CM

## 2022-10-07 DIAGNOSIS — D50.9 IRON DEFICIENCY ANEMIA, UNSPECIFIED IRON DEFICIENCY ANEMIA TYPE: ICD-10-CM

## 2022-10-07 DIAGNOSIS — N18.4 CKD (CHRONIC KIDNEY DISEASE) STAGE 4, GFR 15-29 ML/MIN (HCC): ICD-10-CM

## 2022-10-07 DIAGNOSIS — I50.32 CHRONIC DIASTOLIC CHF (CONGESTIVE HEART FAILURE) (HCC): ICD-10-CM

## 2022-10-07 DIAGNOSIS — N25.81 SECONDARY HYPERPARATHYROIDISM OF RENAL ORIGIN (HCC): ICD-10-CM

## 2022-10-07 DIAGNOSIS — N17.9 ACUTE KIDNEY INJURY SUPERIMPOSED ON CHRONIC KIDNEY DISEASE (HCC): Primary | ICD-10-CM

## 2022-10-07 DIAGNOSIS — N18.4 CONTROLLED TYPE 2 DIABETES MELLITUS WITH STAGE 4 CHRONIC KIDNEY DISEASE, WITHOUT LONG-TERM CURRENT USE OF INSULIN (HCC): ICD-10-CM

## 2022-10-07 PROCEDURE — 99214 OFFICE O/P EST MOD 30 MIN: CPT | Performed by: PHYSICIAN ASSISTANT

## 2022-10-07 RX ORDER — CALCITRIOL 0.25 UG/1
0.25 CAPSULE, LIQUID FILLED ORAL 3 TIMES WEEKLY
Qty: 15 CAPSULE | Refills: 4 | Status: SHIPPED | OUTPATIENT
Start: 2022-10-07

## 2022-10-07 NOTE — PATIENT INSTRUCTIONS
Your kidney function remains stable  Most recent creatinine 3 0  Repeat blood work on Monday to reassess kidney function  We will continue routine surveillance as outlined below  Avoid all NSAIDs to include ibuprofen, Motrin, Aleve, Advil, Naproxen, Celebrex, Indomethacin, Toradol  Stay well hydrated  Continue all prescribed medications  Decrease torsemide to 10 mg daily  Call if blood pressure consistently more than 140/90 or less than 110/50s  High and low blood pressures may affect your kidney function  Recommend low salt diet (2 gm sodium diet)  Please let us know if you are scheduled for any studies with IV contrast (ex: CT scan, arteriogram or cardiac catheterization)   Follow up in 2-3 months with Dr Any Coughlin with repeat labs prior to appointment  Kidney Smart education program recommended for general education regarding your known chronic kidney disease  Please contact the office with new symptoms or concerns  Increase vitamin-D to 2000 units daily  Start calcitriol 0 25 mcg 3 times a week

## 2022-10-07 NOTE — PROGRESS NOTES
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, age-related nephron loss plus episodes of SHOAIB  -Baseline creatinine previously 1 6-1 8 until Dec  '21 when increased to 2 1-2 4  -Follows with Dr Aniya Zamora  -recent creatinine 3 04, above baseline  Creat 2 4 on 8/25/22  -UPCr 0 19  UA with 1+ protein, 5-10 hyaline casts  No hematuria  -electrolytes and acid-base stable  Hgb and uric acid stable  -torsemide decreased to 20 mg daily last week  -avoid nephrotoxins, NSAIDs, hypotension and IV contrast if possible  -stay well hydrated  -repeat BMP now  -Kidney Smart/CKD education discussed  Previously not interested and continues to express opinion  -repeat BMP on Monday, push hydration and decrease torsemide 10 mg  -follow-up with Dr Aniya Zamora in 2-3 months with repeat blood and urine studies  Hypertension/Volume status:  -BP well controlled, at goal  -volume status euvolemic  -continue current medications: amlodipine 10 mg daily, lisinopril 10 mg daily, toprol XL 12 5 mg daily, torsemide 10 mg daily  -Avoid hypotension or fluctuations in blood pressure    -low sodium (2 gm) diet  Encourage regular exercise  -continue to monitor BP at home    Persistent Proteinuria:  -in the setting of hypertensive nephrosclerosis  -stable and continues to improve   -UA with 1+ protein  -UPCR 0 19  -will continue lisinopril  May need to hold if repeat BMP demonstrates worsening renal function in the setting of SHOAIB  -continue to monitor    Hypomagnesemia:  -possibly due to use of PPI   -resolved with Mag oxide 200 mg weekly  -Mag 2 5  -continue to monitor    Anemia of CKD:  -Hgb 12 4, at goal   Goal >10  -current meds: On oral iron  -continue to monitor    Bone Mineral Disease of CKD:  -phosphorus 3 9, at goal  -Calcium stable at 8 5  -secondary hyperparathyroidism: PTH increased to 225    Will start calcitriol 0 2 mcg 3 times a week  -Vitamin D 25 34, at goal   On cholecalciferol 1000 unit daily  Will increase to 2000 units  -renal diet  -continue to monitor  Recheck phosphorus, calcium, PTH, vitamin-D levels prior to next appointment    Chronic diastolic congestive heart failure:  -compensated  -echo:  EF 65% with grade 1 diastolic dysfunction  -volume status euvolemic  -on beta blockers, ACEi and torsemide  -fluid restriction, 2 gm low sodium diet  -daily weights  -management per Cardiology    Dyslipidemia:  -stable  -recommend goal LDL <100  -continue statin  -low-cholesterol and low-fat diet, aerobic exercise  -management per PCP    DM II:  -stable  -HgbA1C 5 6  -continue to hold metformin now that GFR less than 30  -continue to optimize glycemic control to slow progression of chronic kidney disease  -management per primary team    Nephrolithiasis/left renal cyst:  -asymptomatic, demonstrated on CT imaging   -CT abdomen April 2022 with nonobstructing bilateral nephrolithiasis, largest 8 mm in inferior pole left kidney   -no monitoring choir for renal cyst as it was a simple parapelvic cysts  -continue to monitor    Hyperuricemia:  -recent acute right foot gout flare, s/p steroids  - uric acid stable at 7 7  -continue allopurinol    Nicotine dependence:  -discussed the pathophysiology and relationship of smoking and renal disease, including CKD/nephritis and renal cell carcinoma  -encourage smoking cessation but patient not interested in quitting    Age related screening: Your primary caregiver may do yearly screening for colorectal cancer  It is recommended in all men and women over 48years old  You may have screening earlier if you have colon disease or a family history of colorectal cancer      Hilda Huerta was seen today for follow-up  Diagnoses and all orders for this visit:    Acute kidney injury superimposed on chronic kidney disease ([facility] )  -     PTH, intact;  Future  -     Protein / creatinine ratio, urine; Future  -     Phosphorus; Future  -     Magnesium; Future  -     Comprehensive metabolic panel; Future  -     CBC; Future  -     Basic metabolic panel; Future    CKD (chronic kidney disease) stage 4, GFR 15-29 ml/min ([facility])  -     PTH, intact; Future  -     Protein / creatinine ratio, urine; Future  -     Phosphorus; Future  -     Magnesium; Future  -     Comprehensive metabolic panel; Future  -     CBC; Future    Renal cyst, left    Cigarette nicotine dependence with nicotine-induced disorder    Iron deficiency anemia, unspecified iron deficiency anemia type    Hypomagnesemia  -     Magnesium; Future    Mixed hyperlipidemia    Persistent proteinuria, unspecified    Benign hypertension with chronic kidney disease, stage III ([facility])    Chronic diastolic CHF (congestive heart failure) ([facility] )    Controlled type 2 diabetes mellitus with stage 4 chronic kidney disease, without long-term current use of insulin ([facility])    Secondary hyperparathyroidism of renal origin ([facility] )  -     PTH, intact; Future  -     calcitriol (ROCALTROL) 0 25 mcg capsule; Take 1 capsule (0 25 mcg total) by mouth 3 (three) times a week        Follow up with Dr Jessi Monroe in 2-3 months with repeat blood and urine studies  Please call the office with any questions or concerns  Reason for Visit: Follow-up (CKD4)    HPI: Steven Andrew  is a 76 y o  male smoker with CKD 4, HTN, HLD, dm 2, carotid artery stenosis, PAD, s/p fem-pop bypass, CAD, s/p CABG '13, severe AS, s/p TAVR 6/14/22, s/p colectomy, GERD, history of nephrolithiasis who presents for follow up of CKD  Patient followed by Dr Jessi Monroe, last seen 6/28/22  Most recent creatinine 3 04  History of recent acute gout of the right foot treated with prednisone on 9/22/2022  Patient denies recent hospitalizations or ER visits with exception of ER visit this week due to bleeding ear     Pt denies low blood pressure , dizziness, vomiting, diarrhea, recent illness, poor appetite AAA or decreased oral intake  Patient denies NSAID use  Patient denies nausea, vomiting, diarrhea, dyspnea, orthopnea, edema, hematuria or foamy urine       ROS: A complete review of systems was performed and was negative unless otherwise noted in the history of present illness  Allergies:   Patient has no known allergies      Medications:     Current Outpatient Medications:     allopurinol (ZYLOPRIM) 100 mg tablet, Take 1 tablet (100 mg total) by mouth daily, Disp: 90 tablet, Rfl: 3    amLODIPine (NORVASC) 10 mg tablet, TAKE 1 TABLET DAILY, Disp: 90 tablet, Rfl: 3    AMYLASE-LIPASE-PROTEASE PO, Take by mouth 2 (two) times a day before meals, Disp: , Rfl:     aspirin (ECOTRIN LOW STRENGTH) 81 mg EC tablet, Take 81 mg by mouth daily, Disp: , Rfl:     atorvastatin (LIPITOR) 80 mg tablet, TAKE 1 TABLET DAILY AT     BEDTIME, Disp: 90 tablet, Rfl: 3    calcitriol (ROCALTROL) 0 25 mcg capsule, Take 1 capsule (0 25 mcg total) by mouth 3 (three) times a week, Disp: 15 capsule, Rfl: 4    Cholecalciferol (VITAMIN D3 PO), Take 2,000 Units by mouth, Disp: , Rfl:     clopidogrel (PLAVIX) 75 mg tablet, Take 1 tablet (75 mg total) by mouth in the morning , Disp: 90 tablet, Rfl: 3    Cyanocobalamin (VITAMIN B12 PO), Take by mouth once a week  , Disp: , Rfl:     lisinopril (ZESTRIL) 10 mg tablet, Take 1 tablet (10 mg total) by mouth daily, Disp: 90 tablet, Rfl: 3    Magnesium Oxide (MAG-200 PO), Take by mouth once a week , Disp: , Rfl:     metoprolol succinate (TOPROL-XL) 25 mg 24 hr tablet, Take 0 5 tablets (12 5 mg total) by mouth daily, Disp: 45 tablet, Rfl: 1    Multiple Vitamin (MULTIVITAMIN ADULT PO), Take by mouth, Disp: , Rfl:     pantoprazole (PROTONIX) 40 mg tablet, TAKE 1 TABLET DAILY (Patient taking differently: Take 40 mg by mouth daily before breakfast), Disp: 90 tablet, Rfl: 1    potassium chloride (K-DUR,KLOR-CON) 20 mEq tablet, Take 1 tablet (20 mEq total) by mouth daily, Disp: 90 tablet, Rfl: 3   torsemide (DEMADEX) 20 mg tablet, Take 1 tablet (20 mg total) by mouth 2 (two) times a day, Disp: 180 tablet, Rfl: 3    acetaminophen (TYLENOL) 325 mg tablet, Take 3 tablets (975 mg total) by mouth every 8 (eight) hours (Patient not taking: Reported on 10/7/2022), Disp: , Rfl: 0    docusate sodium (COLACE) 100 mg capsule, Take 1 capsule (100 mg total) by mouth 2 (two) times a day (Patient not taking: No sig reported), Disp: , Rfl: 0    Past Medical History:   Diagnosis Date    CAD (coronary artery disease)     Carotid stenosis, asymptomatic, bilateral     Chronic kidney disease     Colon polyp     Diabetes ([facility] [address] )     type 2, non-insulin dependent    Diabetes mellitus [facility]     GERD (gastroesophageal reflux disease)     History of nephrolithiasis     Hyperlipidemia     Hypertension     PAD (peripheral artery disease) [facility]     Severe aortic stenosis      Past Surgical History:   Procedure Laterality Date    APPENDECTOMY      CARDIAC CATHETERIZATION N/A 5/5/2022    Procedure: CARDIAC RHC/LHC; Surgeon: Faustino Quinones DO;  [facility]; Service: Cardiology    CARDIAC CATHETERIZATION N/A 5/5/2022    Procedure: Cardiac Coronary Angiogram;  Surgeon: Faustino Quinones DO;  [facility]; Service: Cardiology    CARDIAC CATHETERIZATION N/A 5/24/2022    Procedure: Cardiac pci;  Surgeon: Lottie Su MD;  [facility];   Service: Cardiology    CARDIAC CATHETERIZATION N/A 6/14/2022    Procedure: CARDIAC TAVR;  Surgeon: Toyin Lipscomb MD;  Location: [facility] MAIN [facility];  Service: Cardiology    COLECTOMY      COLONOSCOPY  2013    CORONARY ARTERY BYPASS GRAFT  2013    X 2    FEMORAL ARTERY - POPLITEAL ARTERY BYPASS GRAFT      HEMORRHOID SURGERY      IR AORTAGRAM WITH RUN-OFF  11/19/2018    OH SLCTV CATHJ 3RD+ ORD SLCTV ABDL PEL/LXTR [facility] Left 8/12/2016    Procedure: LEFT FEMORAL ARTERIOGRAM; BALLOON ANGIOPLASTY; SFA  AND FEMORAL AT VEIN GRAFT; Surgeon: Suman Mo MD;  Location: [facility] MAIN OR;  Service: Vascular    SC TRANSCATHETER TRANSAPICAL REPLACEMT AORTIC VALVE N/A 6/14/2022    Procedure: REPLACEMENT AORTIC VALVE TRANSCATHETER (TAVR) TRANSAPICAL 29MM IRVIN KYLER S3 ULTRA VALVE(ACCESS ON LEFT) ELANA;  Surgeon: Nishant Lilly DO;  Location: [facility];  Service: Cardiac Surgery    TONSILLECTOMY AND ADENOIDECTOMY       Family History   Problem Relation Age of Onset    Heart attack Father     Other Sister         bypass and vlave replacement    Stroke Paternal Uncle     Arrhythmia Neg Hx     Asthma Neg Hx     Clotting disorder Neg Hx     Fainting Neg Hx     Anuerysm Neg Hx     Hypertension Neg Hx         unsure     Hyperlipidemia Neg Hx     Heart failure Neg Hx       reports that he has been smoking cigarettes  He has a 12 50 pack-year smoking history  He has never used smokeless tobacco  He reports current alcohol use  He reports that he does not use drugs  Physical Exam:   Vitals:    10/07/22 1259   BP: 126/50   BP Location: Right arm   Patient Position: Sitting   Cuff Size: Adult   Weight: 78 kg (172 lb)   Height: 5' 10" (1 778 m)     Body mass index is 24 68 kg/m²  General:  Awake, alert, appears comfortable and in no acute distress  Nontoxic  Skin:  No rash, warm, good skin turgor   Eyes:  PERRL, EOMI, sclerae nonicteric   no periorbital edema   ENT:  Moist mucous membranes  Neck:  Trachea midline, symmetric  No JVD  No carotid bruits  Chest:  Clear to auscultation bilaterally without wheezes, crackles or rhonchi  CVS:  Regular rate and rhythm without murmur, gallop or rub  S1 and S2 identified and normal   No S3, S4    Abdomen:  Soft, nontender, nondistended without masses  Normal bowel sounds x 4 quadrants  No bruit  Extremities:  Warm, pink, motor and sensory intact and well perfused  No cyanosis, pallor  No BLE edema  Neuro:  Awake, alert, oriented x3  Grossly intact  Psych:  Appropriate affect    Mentating appropriately  Normal mental status exam      Procedure:  No results found for this or any previous visit  Lab Results   Component Value Date    GLUCOSE 143 (H) 06/14/2022    CALCIUM 8 5 09/29/2022     11/22/2015    K 4 1 09/29/2022    CO2 26 09/29/2022     09/29/2022    BUN 73 (H) 09/29/2022    CREATININE 3 04 (H) 09/29/2022             Invalid input(s): ALBUMIN    EMR, including Epic, Care Everywhere and outside scanned documents reviewed  I have personally reviewed the blood work as stated above and in my note  I have personally reviewed PCP, consultants and prior nephrology notes

## 2022-10-11 ENCOUNTER — APPOINTMENT (OUTPATIENT)
Dept: LAB | Facility: MEDICAL CENTER | Age: 77
End: 2022-10-11
Payer: MEDICARE

## 2022-10-11 ENCOUNTER — TELEPHONE (OUTPATIENT)
Dept: NEPHROLOGY | Facility: CLINIC | Age: 77
End: 2022-10-11

## 2022-10-11 DIAGNOSIS — N18.4 STAGE 4 CHRONIC KIDNEY DISEASE (HCC): ICD-10-CM

## 2022-10-11 DIAGNOSIS — Z95.2 S/P TAVR (TRANSCATHETER AORTIC VALVE REPLACEMENT): ICD-10-CM

## 2022-10-11 DIAGNOSIS — N18.4 CKD (CHRONIC KIDNEY DISEASE) STAGE 4, GFR 15-29 ML/MIN (HCC): Primary | ICD-10-CM

## 2022-10-11 DIAGNOSIS — N17.9 ACUTE KIDNEY INJURY (HCC): ICD-10-CM

## 2022-10-11 LAB
ANION GAP SERPL CALCULATED.3IONS-SCNC: 5 MMOL/L (ref 4–13)
BUN SERPL-MCNC: 43 MG/DL (ref 5–25)
CALCIUM SERPL-MCNC: 9.3 MG/DL (ref 8.3–10.1)
CHLORIDE SERPL-SCNC: 111 MMOL/L (ref 96–108)
CO2 SERPL-SCNC: 26 MMOL/L (ref 21–32)
CREAT SERPL-MCNC: 2.25 MG/DL (ref 0.6–1.3)
GFR SERPL CREATININE-BSD FRML MDRD: 26 ML/MIN/1.73SQ M
GLUCOSE P FAST SERPL-MCNC: 155 MG/DL (ref 65–99)
POTASSIUM SERPL-SCNC: 5.1 MMOL/L (ref 3.5–5.3)
SODIUM SERPL-SCNC: 142 MMOL/L (ref 135–147)

## 2022-10-11 PROCEDURE — 36415 COLL VENOUS BLD VENIPUNCTURE: CPT

## 2022-10-11 PROCEDURE — 80048 BASIC METABOLIC PNL TOTAL CA: CPT

## 2022-10-11 RX ORDER — POTASSIUM CHLORIDE 750 MG/1
10 TABLET, EXTENDED RELEASE ORAL DAILY
Qty: 90 TABLET | Refills: 3 | Status: SHIPPED | OUTPATIENT
Start: 2022-10-11

## 2022-10-11 NOTE — TELEPHONE ENCOUNTER
Not able reach patient on the phone, left message that renal function improved to creatinine 2 2 mg/dL  It appears dose of torsemide was decreased to 10 mg daily during the recent office visit, will need to confirm the dose of torsemide he has been taking as there is a different dose in the medication list   Also potassium trended up to 5 1 so decreasing the potassium chloride to 10 mEq daily, new prescription sent  Ordered for BMP to be done in 1 month to monitor renal function potassium level

## 2022-10-11 NOTE — TELEPHONE ENCOUNTER
----- Message from Mathew Land MD sent at 10/11/2022  7:52 AM EDT -----  Please remind patient to go for BMP   Thank you     ----- Message -----  From: SYSTEM  Sent: 10/11/2022   1:14 AM EDT  To: Mathew Land MD

## 2022-10-12 ENCOUNTER — OFFICE VISIT (OUTPATIENT)
Dept: VASCULAR SURGERY | Facility: CLINIC | Age: 77
End: 2022-10-12
Payer: MEDICARE

## 2022-10-12 VITALS
HEART RATE: 65 BPM | DIASTOLIC BLOOD PRESSURE: 58 MMHG | BODY MASS INDEX: 24.62 KG/M2 | HEIGHT: 70 IN | WEIGHT: 172 LBS | SYSTOLIC BLOOD PRESSURE: 162 MMHG

## 2022-10-12 DIAGNOSIS — N18.4 CKD (CHRONIC KIDNEY DISEASE) STAGE 4, GFR 15-29 ML/MIN (HCC): ICD-10-CM

## 2022-10-12 DIAGNOSIS — I70.213 ATHEROSCLEROSIS OF NATIVE ARTERIES OF EXTREMITIES WITH INTERMITTENT CLAUDICATION, BILATERAL LEGS (HCC): Primary | ICD-10-CM

## 2022-10-12 DIAGNOSIS — I65.23 ASYMPTOMATIC BILATERAL CAROTID ARTERY STENOSIS: Chronic | ICD-10-CM

## 2022-10-12 PROBLEM — I70.45: Status: RESOLVED | Noted: 2021-09-10 | Resolved: 2022-10-12

## 2022-10-12 PROCEDURE — 99213 OFFICE O/P EST LOW 20 MIN: CPT | Performed by: SURGERY

## 2022-10-12 NOTE — PROGRESS NOTES
Assessment/Plan:    Atherosclerosis of native arteries of extremities with intermittent claudication, bilateral legs [facility])  77-year-old male with HTN, HLD, DM, CAD, CABG, AS, bilateral asymptomatic carotid stenosis, PAD status post left endarterectomy 2015, left fem to anterior tibial bypass by Dr Kristopher Li '15, s/p  Angioplasty to bypass graft stenoses '16, h/o Right SFA, popliteal and AT angioplasty in 2018  Right leg worsening short distance  Left side is stable  No rest pain  Prior angiogram from 2018 reviewed,   There left FEM to AT bypass has a venous valve in bypass that is in the area of stenosis  The velocities have been stable  I dont recommend any intervention at this time  On right side the popliteal angioplasty, it remains patent with some restenosis of the SFA which is causing short distance claudication  Due to significant kidney issues including CKD 4, I recommend to continue with medical management  Continue daily aspirin and plavix and statin  Follow up doppler in 6 months  If symptoms get worse or he gets foot wounds we can consider angioplasty with CO2 and limited contrast to protect kidney  Asymptomatic bilateral carotid artery stenosis  Asymptomatic bilateral carotid stenosis, high grade >70%  Remains stable compared to last time  Daily aspirin , plavix and statin  Repeat duplex in 6 months  Diagnoses and all orders for this visit:    Atherosclerosis of native arteries of extremities with intermittent claudication, bilateral legs ([facility])  -     VAS lower limb arterial duplex, complete bilateral; Future  -     VAS carotid complete study; Future    CKD (chronic kidney disease) stage 4, GFR 15-29 ml/min ([facility])  -     VAS lower limb arterial duplex, complete bilateral; Future  -     VAS carotid complete study;  Future    Asymptomatic bilateral carotid artery stenosis  -     VAS lower limb arterial duplex, complete bilateral; Future  -     VAS carotid complete study; Future        Subjective:      Patient ID: Anay Yin  is a 76 y o  male  Pt presents today to rev FREDO/ CV done on 9/13/22  Pt denies TIA or stroke like symptoms  Pt reports cramping w/ walking Rt>Lt and can only walk 1 block  HPI  Right leg pain when he walks, it is a cramp  Goes away after rest   No open wounds in foot  Left leg is unchanged  He has chronic tingling in both feet  No neuro symptoms such as sudden upper or lower ext weakness, numbness, aphasia, dysarthria, imbalance  Recently had elevated creatinine and is now improving  The following portions of the patient's history were reviewed and updated as appropriate: allergies, current medications, past family history, past medical history, past social history, past surgical history and problem list     Review of Systems   Constitutional: Negative  HENT: Negative  Eyes: Negative  Respiratory: Negative  Cardiovascular: Negative  Gastrointestinal: Negative  Endocrine: Negative  Genitourinary: Negative  Musculoskeletal: Negative  Skin: Negative  Allergic/Immunologic: Negative  Neurological: Negative  Hematological: Negative  Psychiatric/Behavioral: Negative  I have reviewed the ROS as entered and made changes as necessary  Objective:      /58 (BP Location: Left arm, Patient Position: Sitting, Cuff Size: Standard)   Pulse 65   Ht 5' 10" (1 778 m)   Wt 78 kg (172 lb)   BMI 24 68 kg/m²          Physical Exam  Vitals and nursing note reviewed  Constitutional:       Appearance: Normal appearance  HENT:      Head: Normocephalic and atraumatic  Cardiovascular:      Rate and Rhythm: Normal rate and regular rhythm  Comments: Strong biphasic DP signal in right     trihasic DP signal on left, Pulse present in the bypass  Musculoskeletal:         General: No deformity  Right lower leg: No edema  Left lower leg: No edema     Skin:     General: Skin is warm and dry       Capillary Refill: Capillary refill takes less than 2 seconds  Neurological:      General: No focal deficit present  Mental Status: He is alert and oriented to person, place, and time     Psychiatric:         Mood and Affect: Mood normal          Behavior: Behavior normal

## 2022-10-12 NOTE — PATIENT INSTRUCTIONS
Atherosclerosis of native arteries of extremities with intermittent claudication, bilateral legs [facility])  33-year-old male with HTN, HLD, DM, CAD, CABG, AS, bilateral asymptomatic carotid stenosis, PAD status post left endarterectomy 2015, left fem to anterior tibial bypass by Dr Tere Munoz '15, s/p  Angioplasty to bypass graft stenoses '16, h/o Right SFA, popliteal and AT angioplasty in 2018  Right leg worsening short distance  Left side is stable  No rest pain  Prior angiogram from 2018 reviewed,   There left FEM to AT bypass has a venous valve in bypass that is in the area of stenosis  The velocities have been stable  I dont recommend any intervention at this time  On right side the popliteal angioplasty, it remains patent with some restenosis of the SFA which is causing short distance claudication  Due to significant kidney issues including CKD 4, I recommend to continue with medical management  Continue daily aspirin and plavix and statin  Follow up doppler in 6 months  If symptoms get worse or he gets foot wounds we can consider angioplasty with CO2 and limited contrast to protect kidney  Asymptomatic bilateral carotid artery stenosis  Asymptomatic bilateral carotid stenosis, high grade >70%  Remains stable compared to last time  Daily aspirin , plavix and statin  Repeat duplex in 6 months

## 2022-10-12 NOTE — ASSESSMENT & PLAN NOTE
Asymptomatic bilateral carotid stenosis, high grade >70%  Remains stable compared to last time  Daily aspirin , plavix and statin  Repeat duplex in 6 months

## 2022-10-12 NOTE — ASSESSMENT & PLAN NOTE
75-year-old male with HTN, HLD, DM, CAD, CABG, AS, bilateral asymptomatic carotid stenosis, PAD status post left endarterectomy 2015, left fem to anterior tibial bypass by Dr Claris Pallas '15, s/p  Angioplasty to bypass graft stenoses '16, h/o Right SFA, popliteal and AT angioplasty in 2018  Right leg worsening short distance  Left side is stable  No rest pain  Prior angiogram from 2018 reviewed,   There left FEM to AT bypass has a venous valve in bypass that is in the area of stenosis  The velocities have been stable  I dont recommend any intervention at this time  On right side the popliteal angioplasty, it remains patent with some restenosis of the SFA which is causing short distance claudication  Due to significant kidney issues including CKD 4, I recommend to continue with medical management  Continue daily aspirin and plavix and statin  Follow up doppler in 6 months  If symptoms get worse or he gets foot wounds we can consider angioplasty with CO2 and limited contrast to protect kidney

## 2022-10-12 NOTE — TELEPHONE ENCOUNTER
Left a second message with patient advising:Not able reach patient on the phone, left message that renal function improved to creatinine 2 2 mg/dL  It appears dose of torsemide was decreased to 10 mg daily during the recent office visit, will need to confirm the dose of torsemide he has been taking as there is a different dose in the medication list   Also potassium trended up to 5 1 so decreasing the potassium chloride to 10 mEq daily, new prescription sent  Ordered for BMP to be done in 1 month to monitor renal function potassium level  Asked patient to call back to confirm these changes and to confirm current dose of torsemide

## 2022-10-12 NOTE — TELEPHONE ENCOUNTER
Patient return  call stated that he is taking torsemide 10 mg  He will  potassium chloride 10 mEq daily  Patient will do BMP in 1 month  No further questions at this time

## 2022-10-21 NOTE — TELEPHONE ENCOUNTER
I lmom for pt to please call back to schedule the EGD with Dr Shanice Hwang  If do not hear back from pt in two weeks, will mail contact letter

## 2022-10-25 ENCOUNTER — APPOINTMENT (OUTPATIENT)
Dept: RADIOLOGY | Facility: MEDICAL CENTER | Age: 77
End: 2022-10-25
Payer: MEDICARE

## 2022-10-25 ENCOUNTER — OFFICE VISIT (OUTPATIENT)
Dept: URGENT CARE | Facility: MEDICAL CENTER | Age: 77
End: 2022-10-25
Payer: MEDICARE

## 2022-10-25 VITALS
SYSTOLIC BLOOD PRESSURE: 146 MMHG | OXYGEN SATURATION: 97 % | WEIGHT: 173 LBS | BODY MASS INDEX: 24.82 KG/M2 | DIASTOLIC BLOOD PRESSURE: 66 MMHG | RESPIRATION RATE: 20 BRPM | HEART RATE: 55 BPM | TEMPERATURE: 97.2 F

## 2022-10-25 DIAGNOSIS — M79.671 RIGHT FOOT PAIN: ICD-10-CM

## 2022-10-25 DIAGNOSIS — M79.672 LEFT FOOT PAIN: ICD-10-CM

## 2022-10-25 DIAGNOSIS — M10.9 ACUTE GOUT OF LEFT FOOT, UNSPECIFIED CAUSE: Primary | ICD-10-CM

## 2022-10-25 DIAGNOSIS — M10.9 ACUTE GOUT OF RIGHT FOOT, UNSPECIFIED CAUSE: ICD-10-CM

## 2022-10-25 PROCEDURE — 99213 OFFICE O/P EST LOW 20 MIN: CPT | Performed by: PHYSICIAN ASSISTANT

## 2022-10-25 PROCEDURE — 73630 X-RAY EXAM OF FOOT: CPT

## 2022-10-25 PROCEDURE — G0463 HOSPITAL OUTPT CLINIC VISIT: HCPCS | Performed by: PHYSICIAN ASSISTANT

## 2022-10-25 RX ORDER — PREDNISONE 20 MG/1
20 TABLET ORAL DAILY
Qty: 5 TABLET | Refills: 0 | Status: SHIPPED | OUTPATIENT
Start: 2022-10-25 | End: 2022-10-25 | Stop reason: SDUPTHER

## 2022-10-25 RX ORDER — PREDNISONE 20 MG/1
20 TABLET ORAL DAILY
Qty: 5 TABLET | Refills: 0 | Status: SHIPPED | OUTPATIENT
Start: 2022-10-25 | End: 2022-10-30

## 2022-10-25 NOTE — PATIENT INSTRUCTIONS
Gout left foot  Prednisone once daily x5 days  Follow up with PCP in 3-5 days  Proceed to  ER if symptoms worsen

## 2022-10-25 NOTE — PROGRESS NOTES
[facility]        NAME: Margie Falcon  is a 76 y o  male  : 1945    MRN: [id number]  DATE: 2022  TIME: 2:16 PM    Assessment and Plan   Acute gout of left foot, unspecified cause [M10 9]  1  Acute gout of left foot, unspecified cause     2  Left foot pain  XR foot 3+ vw left         Patient Instructions     Gout left foot  Prednisone once daily x5 days  Follow up with PCP in 3-5 days  Proceed to  ER if symptoms worsen  Chief Complaint     Chief Complaint   Patient presents with   • Foot Pain     Left foot pain x 2 days  Swelling          History of Present Illness       12-year-old male presents complaining of left foot pain x2 days  Patient does not recall history of trauma  States he has a history of gout but usually gets the outbreaks on the right foot  Usually uses steroids for the gout outbreaks      Review of Systems   Review of Systems   Constitutional: Negative  HENT: Negative  Eyes: Negative  Respiratory: Negative  Negative for apnea, cough, choking, chest tightness, shortness of breath, wheezing and stridor  Cardiovascular: Negative  Negative for chest pain  Musculoskeletal: Positive for arthralgias           Current Medications       Current Outpatient Medications:   •  allopurinol (ZYLOPRIM) 100 mg tablet, Take 1 tablet (100 mg total) by mouth daily, Disp: 90 tablet, Rfl: 3  •  amLODIPine (NORVASC) 10 mg tablet, TAKE 1 TABLET DAILY, Disp: 90 tablet, Rfl: 3  •  AMYLASE-LIPASE-PROTEASE PO, Take by mouth 2 (two) times a day before meals, Disp: , Rfl:   •  aspirin (ECOTRIN LOW STRENGTH) 81 mg EC tablet, Take 81 mg by mouth daily, Disp: , Rfl:   •  atorvastatin (LIPITOR) 80 mg tablet, TAKE 1 TABLET DAILY AT     BEDTIME, Disp: 90 tablet, Rfl: 3  •  calcitriol (ROCALTROL) 0 25 mcg capsule, Take 1 capsule (0 25 mcg total) by mouth 3 (three) times a week, Disp: 15 capsule, Rfl: 4  •  Cholecalciferol (VITAMIN D3 PO), Take 2,000 Units by mouth, Disp: , Rfl:   •  clopidogrel (PLAVIX) 75 mg tablet, Take 1 tablet (75 mg total) by mouth in the morning , Disp: 90 tablet, Rfl: 3  •  Cyanocobalamin (VITAMIN B12 PO), Take by mouth once a week  , Disp: , Rfl:   •  lisinopril (ZESTRIL) 10 mg tablet, Take 1 tablet (10 mg total) by mouth daily, Disp: 90 tablet, Rfl: 3  •  Magnesium Oxide (MAG-200 PO), Take by mouth once a week , Disp: , Rfl:   •  metoprolol succinate (TOPROL-XL) 25 mg 24 hr tablet, Take 0 5 tablets (12 5 mg total) by mouth daily, Disp: 45 tablet, Rfl: 1  •  Multiple Vitamin (MULTIVITAMIN ADULT PO), Take by mouth, Disp: , Rfl:   •  pantoprazole (PROTONIX) 40 mg tablet, TAKE 1 TABLET DAILY (Patient taking differently: Take 40 mg by mouth daily before breakfast), Disp: 90 tablet, Rfl: 1  •  potassium chloride (K-DUR,KLOR-CON) 10 mEq tablet, Take 1 tablet (10 mEq total) by mouth daily, Disp: 90 tablet, Rfl: 3  •  torsemide (DEMADEX) 20 mg tablet, Take 1 tablet (20 mg total) by mouth 2 (two) times a day, Disp: 180 tablet, Rfl: 3  •  acetaminophen (TYLENOL) 325 mg tablet, Take 3 tablets (975 mg total) by mouth every 8 (eight) hours (Patient not taking: No sig reported), Disp: , Rfl: 0  •  docusate sodium (COLACE) 100 mg capsule, Take 1 capsule (100 mg total) by mouth 2 (two) times a day (Patient not taking: No sig reported), Disp: , Rfl: 0    Current Allergies     Allergies as of 10/25/2022   • (No Known Allergies)            The following portions of the patient's history were reviewed and updated as appropriate: allergies, current medications, past family history, past medical history, past social history, past surgical history and problem list      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 ([facility] [address] ) 9/10/2021   • CAD (coronary artery disease)    • Carotid stenosis, asymptomatic, bilateral    • Chronic kidney disease    • Colon polyp    • Diabetes (HCC)     type 2, non-insulin dependent   • Diabetes mellitus ([facility] )    • GERD (gastroesophageal reflux disease)    • History of nephrolithiasis    • Hyperlipidemia    • Hypertension    • PAD (peripheral artery disease) ([facility])    • Severe aortic stenosis        Past Surgical History:   Procedure Laterality Date   • APPENDECTOMY     • CARDIAC CATHETERIZATION N/A 5/5/2022    Procedure: CARDIAC RHC/LHC; Surgeon: Mich Herndon DO;  Location: [facility] CARDIAC CATH LAB; Service: Cardiology   • CARDIAC CATHETERIZATION N/A 5/5/2022    Procedure: Cardiac Coronary Angiogram;  Surgeon: Mich Herndon DO;  Location: [facility] CARDIAC CATH LAB; Service: Cardiology   • CARDIAC CATHETERIZATION N/A 5/24/2022    Procedure: Cardiac pci;  Surgeon: Osmani Schroeder MD;  Location: [facility] CARDIAC CATH LAB;   Service: Cardiology   • CARDIAC CATHETERIZATION N/A 6/14/2022    Procedure: CARDIAC TAVR;  Surgeon: Lopez Sethi MD;  Location: [facility] MAIN OR;  Service: Cardiology   • COLECTOMY     • COLONOSCOPY  2013   • CORONARY ARTERY BYPASS GRAFT  2013    X 2   • FEMORAL ARTERY - POPLITEAL ARTERY BYPASS GRAFT     • HEMORRHOID SURGERY     • IR AORTAGRAM WITH RUN-OFF  11/19/2018   • LA SLCTV CATHJ 3RD+ ORD SLCTV ABDL PEL/LXTR [facility] Left 8/12/2016    Procedure: LEFT FEMORAL ARTERIOGRAM; BALLOON ANGIOPLASTY; SFA  AND FEMORAL AT VEIN GRAFT;  Surgeon: Estevan Villafana MD;  Location: [facility] MAIN OR;  Service: Vascular   • LA TRANSCATHETER TRANSAPICAL REPLACEMT AORTIC VALVE N/A 6/14/2022    Procedure: REPLACEMENT AORTIC VALVE TRANSCATHETER (TAVR) TRANSAPICAL 29MM IRVIN KYLER S3 ULTRA VALVE(ACCESS ON LEFT) ELANA;  Surgeon: Dillon Boyle DO;  Location: [facility] MAIN OR;  Service: Cardiac Surgery   • TONSILLECTOMY AND ADENOIDECTOMY         Family History   Problem Relation Age of Onset   • Heart attack Father    • Other Sister         bypass and vlave replacement   • Stroke Paternal Uncle    • Arrhythmia Neg Hx    • Asthma Neg Hx    • Clotting disorder Neg Hx    • Fainting Neg Hx    • Anuerysm Neg Hx    • Hypertension Neg Hx         unsure    • Hyperlipidemia Neg Hx    • Heart failure Neg Hx          Medications have been verified  Objective   /66   Pulse 55   Temp (!) 97 2 °F (36 2 °C)   Resp 20   Wt 78 5 kg (173 lb)   SpO2 97%   BMI 24 82 kg/m²        Physical Exam     Physical Exam  Constitutional:       General: He is not in acute distress  Appearance: Normal appearance  He is well-developed  He is not diaphoretic  HENT:      Head: Normocephalic and atraumatic  Cardiovascular:      Rate and Rhythm: Normal rate and regular rhythm  Heart sounds: Normal heart sounds  Pulmonary:      Effort: Pulmonary effort is normal  No respiratory distress  Breath sounds: Normal breath sounds  No wheezing or rales  Chest:      Chest wall: No tenderness  Musculoskeletal:      Cervical back: Normal range of motion and neck supple  Legs:    Lymphadenopathy:      Cervical: No cervical adenopathy  Neurological:      Mental Status: He is alert

## 2022-10-26 NOTE — PROGRESS NOTES
Name: Majo Murray  : 1945      MRN: [id number]  Encounter Provider: Sumit Castañeda MD  Encounter Date: 10/27/2022   Encounter department: [address] [place] [address] Place     1  Gastroesophageal reflux disease without esophagitis  Assessment & Plan:  Patient to continue with present therapy and decrease caffeine, avoid ETOH and smoking to decrease acid production  Pt should also cease eating prior to bedtime and avoid excessive fluid intake prior to sleep  May use antacids as needed for breakthrough GERD  All pateint questions answered today about this condition  2  Controlled type 2 diabetes mellitus with stage 4 chronic kidney disease, without long-term current use of insulin ([facility] )  Assessment & Plan:  Patient is stable with current meds and discussed a low carb diet  Pt  recommended to see eye doctor and foot doctor for routine screening as per protocol  Recheck A1C  and Cr in 3 months  Patient questions answered today about this condtion  Lab Results   Component Value Date    HGBA1C 5 6 2022       Orders:  -     Hemoglobin A1C; Future  -     Comprehensive metabolic panel; Future    3  Atherosclerosis of native arteries of extremities with intermittent claudication, bilateral legs ([facility] )  Assessment & Plan:  Patient is stable  and will continue present plan of care and reassess at next routine visit  All questions about this problem from patient were answered today  4  Coronary artery disease involving native coronary artery of native heart without angina pectoris  Assessment & Plan:  Patient to continue  with current cardiac meds to decrease risk of re stenosis  Patient to follow up with cardiology for scheduled appointments to decrease risk for further cardiac problems with appropriate diagnostic testing to reassess cardiac status  Patient had alll questions about this problem answered today        5  Primary hypertension  Assessment & Plan:  Patient is stable with current anti-hypertensive medicine and continue to follow a low sodium diet and take current medication  All questions about this condition were answered today  6  Cigarette nicotine dependence with nicotine-induced disorder  Assessment & Plan:  Patient encouraged to quit using tobacco for that increases their risk for COPD, lung cancer,stroke, oral cancer and heart disease  If patient does not want to quit they should let me know  when they are interested in quitting  There are numerous options to use to quit and we can discuss them  7  Gout of left foot, unspecified cause, unspecified chronicity  -     allopurinol (ZYLOPRIM) 300 mg tablet; Take 1 tablet (300 mg total) by mouth daily  -     Uric acid; Future    8  Gout of left hand, unspecified cause, unspecified chronicity        Falls Plan of Care: balance, strength, and gait training instructions were provided  Home safety education provided  Tobacco Cessation Counseling: Tobacco cessation counseling was provided  The patient is sincerely urged to quit consumption of tobacco  He is not ready to quit tobacco  Medication options and side effects of medication discussed  Patient agreed to medication  Subjective     This 68-year-old male here today for checkup on multiple medical problems patient with coronary artery disease hypertension chronic kidney disease stage 3B to 4 atherosclerosis as well as follow-up on his recent episode of gout  Patient states that he has gout in his left foot was seen to cover recent walk-in patient has some arthritis in his right big toe but the area of of contention is in his mid foot on the left side on the dorsum and the x-ray there is fine  Patient has some redness and was given some prednisone 2 minutes that was chronic kidney disease he really can not needs to avoid anti-inflammatories    Patient tells me he has been taking allopurinol 100 mg once a day and he has had about 3 year for episodes of gout in the last year  At this point to the liberty of increasing his allopurinol at 300 mg this evening his uric acid out a bit lower and decrease his frequency of gout attacks  Discussed with patient need to watch his diet need to avoid alcohol or any kind of seafood shellfish or any kind of organ meats that may aggravate his gout  Review of Systems   Constitutional: Negative for activity change, appetite change, chills, fatigue, fever and unexpected weight change  HENT: Negative for congestion, ear pain, hearing loss, mouth sores, postnasal drip, sinus pressure, sinus pain, sneezing and sore throat  Respiratory: Negative for apnea, cough, shortness of breath and wheezing  Cardiovascular: Negative for chest pain, palpitations and leg swelling  Gastrointestinal: Negative for abdominal pain, constipation, diarrhea, nausea and vomiting  Endocrine: Negative for cold intolerance and heat intolerance  Genitourinary: Negative for dysuria, frequency and hematuria  Musculoskeletal: Negative for arthralgias, back pain, gait problem, joint swelling and neck pain  Skin: Positive for color change  Negative for rash  Neurological: Negative for dizziness, weakness and numbness  Hematological: Does not bruise/bleed easily  Psychiatric/Behavioral: Negative for agitation, behavioral problems, confusion, hallucinations and sleep disturbance  The patient is not nervous/anxious          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 ([place] [address] ) 9/10/2021   • CAD (coronary artery disease)    • Carotid stenosis, asymptomatic, bilateral    • Chronic kidney disease    • Colon polyp    • Diabetes ([facility] )     type 2, non-insulin dependent   • Diabetes mellitus (HCC)    • GERD (gastroesophageal reflux disease)    • History of nephrolithiasis    • Hyperlipidemia    • Hypertension    • PAD (peripheral artery disease) ([place] [address] ) • Severe aortic stenosis      Past Surgical History:   Procedure Laterality Date   • APPENDECTOMY     • CARDIAC CATHETERIZATION N/A 5/5/2022    Procedure: CARDIAC RHC/LHC; Surgeon: Cynthia Babin DO;  Location: [facility] CARDI[facility]; Service: Cardiology   • CARDIAC CATHETERIZATION N/A 5/5/2022    Procedure: Cardiac Coronary Angiogram;  Surgeon: Cynthia Babin DO;  Location: [facility] CARDIAC CATH LAB; Service: Cardiology   • CARDIAC CATHETERIZATION N/A 5/24/2022    Procedure: Cardiac pci;  Surgeon: Dia Mckeon MD;  Location: [facility] CARDIAC CATH LAB;   Service: Cardiology   • CARDIAC CATHETERIZATION N/A 6/14/2022    Procedure: CARDIAC TAVR;  Surgeon: Davion Pathak MD;  Location: [facility] MAIN OR;  Service: Cardiology   • COLECTOMY     • COLONOSCOPY  2013   • CORONARY ARTERY BYPASS GRAFT  2013    X 2   • FEMORAL ARTERY - POPLITEAL ARTERY BYPASS GRAFT     • HEMORRHOID SURGERY     • IR AORTAGRAM WITH RUN-OFF  11/19/2018   • SC SLCTV CATHJ 3RD+ ORD SLCTV ABDL PEL/LXTR [facility] Left 8/12/2016    Procedure: LEFT FEMORAL ARTERIOGRAM; BALLOON ANGIOPLASTY; SFA  AND FEMORAL AT VEIN GRAFT;  Surgeon: Cam Milner MD;  Location: [facility] MAIN OR;  Service: Vascular   • SC TRANSCATHETER TRANSAPICAL REPLACEMT AORTIC VALVE N/A 6/14/2022    Procedure: REPLACEMENT AORTIC VALVE TRANSCATHETER (TAVR) TRANSAPICAL 29MM IRVIN KYLER S3 ULTRA VALVE(ACCESS ON LEFT) ELANA;  Surgeon: Callie Rahman DO;  Location: [facility] MAIN OR;  Service: Cardiac Surgery   • TONSILLECTOMY AND ADENOIDECTOMY       Family History   Problem Relation Age of Onset   • Heart attack Father    • Other Sister         bypass and vlave replacement   • Stroke Paternal Uncle    • Arrhythmia Neg Hx    • Asthma Neg Hx    • Clotting disorder Neg Hx    • Fainting Neg Hx    • Anuerysm Neg Hx    • Hypertension Neg Hx         unsure    • Hyperlipidemia Neg Hx    • Heart failure Neg Hx      Social History     Socioeconomic History   • Marital status:      Spouse name: None   • Number of children: None   • Years of education: None   • Highest education level: None   Occupational History   • None   Tobacco Use   • Smoking status: Current Every Day Smoker     Packs/day: 0 25     Years: 50 00     Pack years: 12 50     Types: Cigarettes   • Smokeless tobacco: Never Used   • Tobacco comment: 3-4 cigarettes daily   Vaping Use   • Vaping Use: Never used   Substance and Sexual Activity   • Alcohol use: Yes     Comment: rare   • Drug use: No   • Sexual activity: None   Other Topics Concern   • None   Social History Narrative    · Most recent tobacco use screenin2018      · Do you currently or have you served in [email or web address]: Yes      · If Yes, What branch of service:   [employer]      · Occupation:   Dentists      · Exercise level:   Occasional        · Caffeine intake:   Heavy      · Marital status:         · Diet:   Regular  low fat     · Seat belts used routinely:   Yes      · Smoke alarm in home: Yes      · Advance directive: Yes      · General stress level:   Low      Social Determinants of Health     Financial Resource Strain: Not on file   Food Insecurity: No Food Insecurity   • Worried About Running Out of Food in the Last Year: Never true   • Ran Out of Food in the Last Year: Never true   Transportation Needs: No Transportation Needs   • Lack of Transportation (Medical): No   • Lack of Transportation (Non-Medical):  No   Physical Activity: Not on file   Stress: Not on file   Social Connections: Not on file   Intimate Partner Violence: Not on file   Housing Stability: Low Risk    • Unable to Pay for Housing in the Last Year: No   • Number of Places Lived in the Last Year: 1   • Unstable Housing in the Last Year: No     Current Outpatient Medications on File Prior to Visit   Medication Sig   • amLODIPine (NORVASC) 10 mg tablet TAKE 1 TABLET DAILY   • AMYLASE-LIPASE-PROTEASE PO Take by mouth 2 (two) times a day before meals   • aspirin (ECOTRIN LOW STRENGTH) 81 mg EC tablet Take 81 mg by mouth daily   • atorvastatin (LIPITOR) 80 mg tablet TAKE 1 TABLET DAILY AT     BEDTIME   • calcitriol (ROCALTROL) 0 25 mcg capsule Take 1 capsule (0 25 mcg total) by mouth 3 (three) times a week   • Cholecalciferol (VITAMIN D3 PO) Take 2,000 Units by mouth   • clopidogrel (PLAVIX) 75 mg tablet Take 1 tablet (75 mg total) by mouth in the morning     • Cyanocobalamin (VITAMIN B12 PO) Take by mouth once a week     • lisinopril (ZESTRIL) 10 mg tablet Take 1 tablet (10 mg total) by mouth daily   • Magnesium Oxide (MAG-200 PO) Take by mouth once a week    • metoprolol succinate (TOPROL-XL) 25 mg 24 hr tablet Take 0 5 tablets (12 5 mg total) by mouth daily   • Multiple Vitamin (MULTIVITAMIN ADULT PO) Take by mouth   • pantoprazole (PROTONIX) 40 mg tablet TAKE 1 TABLET DAILY (Patient taking differently: Take 40 mg by mouth daily before breakfast)   • potassium chloride (K-DUR,KLOR-CON) 10 mEq tablet Take 1 tablet (10 mEq total) by mouth daily   • predniSONE 20 mg tablet Take 1 tablet (20 mg total) by mouth daily for 5 days   • torsemide (DEMADEX) 20 mg tablet Take 1 tablet (20 mg total) by mouth 2 (two) times a day   • [DISCONTINUED] allopurinol (ZYLOPRIM) 100 mg tablet Take 1 tablet (100 mg total) by mouth daily   • acetaminophen (TYLENOL) 325 mg tablet Take 3 tablets (975 mg total) by mouth every 8 (eight) hours (Patient not taking: No sig reported)   • docusate sodium (COLACE) 100 mg capsule Take 1 capsule (100 mg total) by mouth 2 (two) times a day (Patient not taking: No sig reported)     No Known Allergies  Immunization History   Administered Date(s) Administered   • COVID-19 PFIZER VACCINE 0 3 ML IM 03/03/2021, 03/24/2021, 09/30/2021   • COVID-19 Pfizer Vac BIVALENT Stuart-sucrose 12 Yr+ IM (BOOSTER ONLY) 09/29/2022   • H1N1 Inj 11/15/2020   • H1N1, All Formulations 11/15/2020   • INFLUENZA 10/04/2011, 10/19/2012, 10/16/2014, 10/13/2016, 10/06/2018, 11/11/2021, 10/19/2022   • Influenza Split High Dose Preservative Free IM 10/06/2018, 11/01/2019   • Pneumococcal Conjugate 13-Valent 12/04/2015   • Pneumococcal Polysaccharide PPV23 06/11/2019   • Tdap 10/15/2021   Patient's shoes and socks removed  Right Foot/Ankle   Right Foot Inspection  Skin Exam: skin normal, dry skin, callus and callus  Skin not intact, no warmth, no erythema, no maceration, no abnormal color, no pre-ulcer and no ulcer  Toe Exam: ROM and strength within normal limits  No tenderness    Sensory   Vibration: diminished  Proprioception: diminished  Monofilament testing: diminished    Vascular  Capillary refills: < 3 seconds  The right DP pulse is 2+  The right PT pulse is 2+  Left Foot/Ankle  Left Foot Inspection  Skin Exam: dry skin and erythema  Skin not intact, no warmth, no maceration, normal color, no pre-ulcer, no ulcer and no callus  Toe Exam: No swelling and no tenderness  Sensory   Vibration: diminished  Proprioception: diminished  Monofilament testing: diminished    Vascular  Capillary refills: < 3 seconds  The left DP pulse is 2+  The left PT pulse is 2+  Assign Risk Category  Deformity present  Loss of protective sensation  Weak pulses  Risk: 2      Objective     /60 (BP Location: Left arm, Patient Position: Sitting, Cuff Size: Standard)   Pulse 62   Temp 97 6 °F (36 4 °C) (Temporal)   Resp 18   Ht 5' 10" (1 778 m)   Wt 78 9 kg (174 lb)   SpO2 97%   BMI 24 97 kg/m²     Physical Exam  Vitals and nursing note reviewed  Constitutional:       Appearance: He is well-developed  HENT:      Head: Normocephalic and atraumatic  Nose: Nose normal    Eyes:      General: No scleral icterus  Conjunctiva/sclera: Conjunctivae normal       Pupils: Pupils are equal, round, and reactive to light  Neck:      Thyroid: No thyromegaly  Cardiovascular:      Rate and Rhythm: Normal rate and regular rhythm  Pulses: Pulses are weak             Dorsalis pedis pulses are 2+ on the right side and 2+ on the left side  Posterior tibial pulses are 2+ on the right side and 2+ on the left side  Heart sounds: Normal heart sounds  Pulmonary:      Effort: Pulmonary effort is normal  No respiratory distress  Breath sounds: Normal breath sounds  No wheezing  Abdominal:      General: Bowel sounds are normal       Palpations: Abdomen is soft  Tenderness: There is no abdominal tenderness  There is no guarding or rebound  Musculoskeletal:         General: Normal range of motion  Cervical back: Normal range of motion and neck supple  Feet:      Right foot:      Skin integrity: Callus and dry skin present  No ulcer, skin breakdown, erythema or warmth  Left foot:      Skin integrity: Erythema and dry skin present  No ulcer, skin breakdown, warmth or callus  Skin:     General: Skin is warm and dry  Findings: Erythema present  No rash  Neurological:      Mental Status: He is alert and oriented to person, place, and time     Psychiatric:         Behavior: Behavior normal        Malena Lopez MD

## 2022-10-26 NOTE — ASSESSMENT & PLAN NOTE
Patient to continue  with current cardiac meds to decrease risk of re stenosis  Patient to follow up with cardiology for scheduled appointments to decrease risk for further cardiac problems with appropriate diagnostic testing to reassess cardiac status  Patient had alll questions about this problem answered today  negative...

## 2022-10-27 ENCOUNTER — OFFICE VISIT (OUTPATIENT)
Dept: FAMILY MEDICINE CLINIC | Facility: CLINIC | Age: 77
End: 2022-10-27
Payer: MEDICARE

## 2022-10-27 VITALS
WEIGHT: 174 LBS | DIASTOLIC BLOOD PRESSURE: 60 MMHG | OXYGEN SATURATION: 97 % | HEIGHT: 70 IN | TEMPERATURE: 97.6 F | HEART RATE: 62 BPM | BODY MASS INDEX: 24.91 KG/M2 | SYSTOLIC BLOOD PRESSURE: 146 MMHG | RESPIRATION RATE: 18 BRPM

## 2022-10-27 DIAGNOSIS — E11.22 CONTROLLED TYPE 2 DIABETES MELLITUS WITH STAGE 4 CHRONIC KIDNEY DISEASE, WITHOUT LONG-TERM CURRENT USE OF INSULIN (HCC): ICD-10-CM

## 2022-10-27 DIAGNOSIS — M10.9 GOUT OF LEFT FOOT, UNSPECIFIED CAUSE, UNSPECIFIED CHRONICITY: ICD-10-CM

## 2022-10-27 DIAGNOSIS — I25.10 CORONARY ARTERY DISEASE INVOLVING NATIVE CORONARY ARTERY OF NATIVE HEART WITHOUT ANGINA PECTORIS: ICD-10-CM

## 2022-10-27 DIAGNOSIS — I10 PRIMARY HYPERTENSION: ICD-10-CM

## 2022-10-27 DIAGNOSIS — F17.219 CIGARETTE NICOTINE DEPENDENCE WITH NICOTINE-INDUCED DISORDER: ICD-10-CM

## 2022-10-27 DIAGNOSIS — N18.4 CONTROLLED TYPE 2 DIABETES MELLITUS WITH STAGE 4 CHRONIC KIDNEY DISEASE, WITHOUT LONG-TERM CURRENT USE OF INSULIN (HCC): ICD-10-CM

## 2022-10-27 DIAGNOSIS — I70.213 ATHEROSCLEROSIS OF NATIVE ARTERIES OF EXTREMITIES WITH INTERMITTENT CLAUDICATION, BILATERAL LEGS (HCC): ICD-10-CM

## 2022-10-27 DIAGNOSIS — K21.9 GASTROESOPHAGEAL REFLUX DISEASE WITHOUT ESOPHAGITIS: Primary | ICD-10-CM

## 2022-10-27 DIAGNOSIS — M10.9 GOUT OF LEFT HAND, UNSPECIFIED CAUSE, UNSPECIFIED CHRONICITY: ICD-10-CM

## 2022-10-27 PROCEDURE — 99214 OFFICE O/P EST MOD 30 MIN: CPT | Performed by: FAMILY MEDICINE

## 2022-10-27 RX ORDER — ALLOPURINOL 300 MG/1
300 TABLET ORAL DAILY
Qty: 90 TABLET | Refills: 3 | Status: SHIPPED | OUTPATIENT
Start: 2022-10-27

## 2022-10-31 ENCOUNTER — TELEPHONE (OUTPATIENT)
Dept: FAMILY MEDICINE CLINIC | Facility: CLINIC | Age: 77
End: 2022-10-31

## 2022-10-31 DIAGNOSIS — Z79.2 PROPHYLACTIC ANTIBIOTIC: Primary | ICD-10-CM

## 2022-10-31 RX ORDER — AMOXICILLIN 500 MG/1
TABLET, FILM COATED ORAL
Qty: 4 TABLET | Refills: 3 | Status: SHIPPED | OUTPATIENT
Start: 2022-10-31 | End: 2022-10-31

## 2022-10-31 NOTE — TELEPHONE ENCOUNTER
Pt is having a dental procedure done beginning of December and needs an antibiotic sent to the pharmacy for this  Patient would like to have it sent to the [facility] in [place]

## 2022-11-03 ENCOUNTER — TELEPHONE (OUTPATIENT)
Dept: GASTROENTEROLOGY | Facility: AMBULARY SURGERY CENTER | Age: 77
End: 2022-11-03

## 2022-11-03 NOTE — TELEPHONE ENCOUNTER
Attn: Dr Dagmar Vela patient is scheduled for procedure: EGD procedure     On:12-29-22      With: Dr Jenny Cedeno is taking the following blood thinner: PLAVIX     Can this be stopped 5 days prior to the procedure?       Physician Approving clearance:

## 2022-11-08 ENCOUNTER — LAB (OUTPATIENT)
Dept: LAB | Facility: MEDICAL CENTER | Age: 77
End: 2022-11-08

## 2022-11-08 ENCOUNTER — TELEPHONE (OUTPATIENT)
Dept: NEPHROLOGY | Facility: CLINIC | Age: 77
End: 2022-11-08

## 2022-11-08 DIAGNOSIS — N18.4 CKD (CHRONIC KIDNEY DISEASE) STAGE 4, GFR 15-29 ML/MIN (HCC): ICD-10-CM

## 2022-11-08 DIAGNOSIS — E11.22 CONTROLLED TYPE 2 DIABETES MELLITUS WITH STAGE 4 CHRONIC KIDNEY DISEASE, WITHOUT LONG-TERM CURRENT USE OF INSULIN (HCC): ICD-10-CM

## 2022-11-08 DIAGNOSIS — M10.9 GOUT OF LEFT FOOT, UNSPECIFIED CAUSE, UNSPECIFIED CHRONICITY: ICD-10-CM

## 2022-11-08 DIAGNOSIS — N18.4 CONTROLLED TYPE 2 DIABETES MELLITUS WITH STAGE 4 CHRONIC KIDNEY DISEASE, WITHOUT LONG-TERM CURRENT USE OF INSULIN (HCC): ICD-10-CM

## 2022-11-08 LAB
ALBUMIN SERPL BCP-MCNC: 3.3 G/DL (ref 3.5–5)
ALP SERPL-CCNC: 93 U/L (ref 46–116)
ALT SERPL W P-5'-P-CCNC: 17 U/L (ref 12–78)
ANION GAP SERPL CALCULATED.3IONS-SCNC: 3 MMOL/L (ref 4–13)
AST SERPL W P-5'-P-CCNC: 13 U/L (ref 5–45)
BILIRUB SERPL-MCNC: 0.56 MG/DL (ref 0.2–1)
BUN SERPL-MCNC: 36 MG/DL (ref 5–25)
CALCIUM ALBUM COR SERPL-MCNC: 10.2 MG/DL (ref 8.3–10.1)
CALCIUM SERPL-MCNC: 9.6 MG/DL (ref 8.3–10.1)
CHLORIDE SERPL-SCNC: 108 MMOL/L (ref 96–108)
CO2 SERPL-SCNC: 29 MMOL/L (ref 21–32)
CREAT SERPL-MCNC: 2.08 MG/DL (ref 0.6–1.3)
EST. AVERAGE GLUCOSE BLD GHB EST-MCNC: 174 MG/DL
GFR SERPL CREATININE-BSD FRML MDRD: 29 ML/MIN/1.73SQ M
GLUCOSE P FAST SERPL-MCNC: 154 MG/DL (ref 65–99)
HBA1C MFR BLD: 7.7 %
POTASSIUM SERPL-SCNC: 4.4 MMOL/L (ref 3.5–5.3)
PROT SERPL-MCNC: 7.4 G/DL (ref 6.4–8.4)
SODIUM SERPL-SCNC: 140 MMOL/L (ref 135–147)
URATE SERPL-MCNC: 5.2 MG/DL (ref 3.5–8.5)

## 2022-11-08 NOTE — TELEPHONE ENCOUNTER
Lm for the patient, creatinine is stable potassium as well  Calcium is elevated please avoid any calcium or diary products at this time  repeat labs prior to next appt

## 2022-11-08 NOTE — TELEPHONE ENCOUNTER
----- Message from Marc Gonzalez MD sent at 11/8/2022  3:31 PM EST -----  Please inform patient that renal function improved to creatinine 2 0 mg/dL and potassium at normal range  Calcium slightly on higher side at 10 2, avoid any calcium supplementation    Repeat labs before the office visit in January

## 2022-11-08 NOTE — RESULT ENCOUNTER NOTE
Please inform patient that renal function improved to creatinine 2 0 mg/dL and potassium at normal range  Calcium slightly on higher side at 10 2, avoid any calcium supplementation    Repeat labs before the office visit in January

## 2022-11-08 NOTE — TELEPHONE ENCOUNTER
----- Message from Danelle Mtz MD sent at 11/8/2022  3:31 PM EST -----  Please inform patient that renal function improved to creatinine 2 0 mg/dL and potassium at normal range  Calcium slightly on higher side at 10 2, avoid any calcium supplementation    Repeat labs before the office visit in January

## 2022-11-15 ENCOUNTER — NURSE TRIAGE (OUTPATIENT)
Dept: OTHER | Facility: OTHER | Age: 77
End: 2022-11-15

## 2022-11-15 NOTE — TELEPHONE ENCOUNTER
Patient would like a call back with specific instructions on when to stop taking all of his medications and which ones he can take the day of his procedure  Reason for Disposition  • Caller has NON-URGENT medicine question about med that PCP or specialist prescribed and triager unable to answer question    Answer Assessment - Initial Assessment Questions  1  NAME of MEDICATION: "What medicine are you calling about?"      All   2  QUESTION: "What is your question?" (e g , medication refill, side effect)      When should I stop taking my medication?     Protocols used: MEDICATION QUESTION CALL-ADULT-[place]

## 2022-11-15 NOTE — TELEPHONE ENCOUNTER
Regarding: medication question   ----- Message from [facility] sent at 11/15/2022 10:11 AM EST -----  "I would like to know when I should stop taking my medication prior to EGD "

## 2022-11-22 ENCOUNTER — OFFICE VISIT (OUTPATIENT)
Dept: DERMATOLOGY | Facility: CLINIC | Age: 77
End: 2022-11-22

## 2022-11-22 VITALS — BODY MASS INDEX: 24.34 KG/M2 | HEIGHT: 70 IN | TEMPERATURE: 97.4 F | WEIGHT: 170 LBS

## 2022-11-22 DIAGNOSIS — D48.9 NEOPLASM OF UNCERTAIN BEHAVIOR: Primary | ICD-10-CM

## 2022-11-22 DIAGNOSIS — L57.0 KERATOSIS, ACTINIC: ICD-10-CM

## 2022-11-22 NOTE — PATIENT INSTRUCTIONS
I  RATIONALE FOR PROCEDURE  I understand that a skin biopsy allows the Dermatologist to test a lesion or rash under the microscope to obtain a diagnosis  It usually involves numbing the area with numbing medication and removing a small piece of skin; sometimes the area will be closed with sutures  In this specific procedure, sutures are not usually needed  If any sutures are placed, then they are usually need to be removed in 2 weeks or less  I understand that my Dermatologist recommends that a skin "shave" biopsy be performed today  A local anesthetic, similar to the kind that a dentist uses when filling a cavity, will be injected with a very small needle into the skin area to be sampled  The injected skin and tissue underneath "will go to sleep” and become numb so no pain should be felt afterwards  An instrument shaped like a tiny "razor blade" (shave biopsy instrument) will be used to cut a small piece of tissue and skin from the area so that a sample of tissue can be taken and examined more closely under the microscope  A slight amount of bleeding will occur, but it will be stopped with direct pressure and a pressure bandage and any other appropriate methods  I understands that a scar will form where the wound was created  Surgical ointment will be applied to help protect the wound  Sutures are not usually needed      II   RISKS AND POTENTIAL COMPLICATIONS   I understand the risks and potential complications of a skin biopsy include but are not limited to the following:  Bleeding  Infection  Pain  Scar/keloid  Skin discoloration  Incomplete Removal  Recurrence  Nerve Damage/Numbness/Loss of Function  Allergic Reaction to Anesthesia  Biopsies are diagnostic procedures and based on findings additional treatment or evaluation may be required  Loss or destruction of specimen resulting in no additional findings    My Dermatologist has explained to me the nature of the condition, the nature of the procedure, and the benefits to be reasonably expected compared with alternative approaches  My Dermatologist has discussed the likelihood of major risks or complications of this procedure including the specific risks listed above, such as bleeding, infection, and scarring/keloid  I understand that a scar is expected after this procedure  I understand that my physician cannot predict if the scar will form a "keloid," which extends beyond the borders of the wound that is created  A keloid is a thick, painful, and bumpy scar  A keloid can be difficult to treat, as it does not always respond well to therapy, which includes injecting cortisone directly into the keloid every few weeks  While this usually reduces the pain and size of the scar, it does not eliminate it  I understand that photographs may be taken before and after the procedure  These will be maintained as part of the medical providers confidential records and may not be made available to me  I further authorize the medical provider to use the photographs for teaching purposes or to illustrate scientific papers, books, or lectures if in his/her judgment, medical research, education, or science may benefit from its use  I have had an opportunity to fully inquire about the risks and benefits of this procedure and its alternatives  I have been given ample time and opportunity to ask questions and to seek a second opinion if I wished to do so  I acknowledge that there have specifically been no guarantees as to the cosmetic results from the procedure  I am aware that with any procedure there is always the possibility of an unexpected complication  III  POST-PROCEDURAL CARE (WHAT YOU WILL NEED TO DO "AFTER THE BIOPSY" TO OPTIMIZE HEALING)    Keep the area clean and dry  Try NOT to remove the bandage or get it wet for the first 24 hours      Gently clean the area and apply surgical ointment (such as Vaseline petrolatum ointment, which is available "over the counter" and not a prescription) to the biopsy site for up to 2 weeks straight  This acts to protect the wound from the outside world  Sutures are not usually placed in this procedure  If any sutures were placed, return for suture removal as instructed (generally 1 week for the face, 2 weeks for the body)  Take Acetaminophen (Tylenol) for discomfort, if no contraindications  Ibuprofen or aspirin could make bleeding worse  Call our office immediately for signs of infection: fever, chills, increased redness, warmth, tenderness, discomfort/pain, or pus or foul smell coming from the wound  WHAT TO DO IF THERE IS ANY BLEEDING? If a small amount of bleeding is noticed, place a clean cloth over the area and apply firm pressure for ten minutes  Check the wound after 10 minutes of direct pressure  If bleeding persists, try one more time for an additional 10 minutes of direct pressure on the area  If the bleeding becomes heavier or does not stop after the second attempt, or if you have any other questions about this procedure, then please call your [address] [place] [address]'s Dermatologist by calling [phone] ([phone])  I hereby acknowledge that I have reviewed and verified the site with my Dermatologist and have requested and authorized my Dermatologist to proceed with the procedure  Assessment and Plan:  Based on a thorough discussion of this condition and the management approach to it (including a comprehensive discussion of the known risks, side effects and potential benefits of treatment), the patient (family) agrees to implement the following specific plan:    Cryotherapy done in office today     Actinic keratoses are very common on sites repeatedly exposed to the sun, especially the backs of the hands and the face, most often affecting the ears, nose, cheeks, upper lip, vermilion of the lower lip, temples, forehead and balding scalp   In severely chronically sun-damaged individuals, they may also be found on the upper trunk, upper and lower limbs, and dorsum of feet  We discussed the theoretical premalignant (“pre-cancerous”) nature and etiology of these growths  We discussed the prevailing notion that actinic keratoses are a reflection of abnormal skin cell development due to DNA damage by short wavelength UVB  They are more likely to appear if the immune function is poor, due to aging, recent sun exposure, predisposing disease or certain drugs  We discussed that the main concern is that actinic keratoses may predispose to squamous cell carcinoma  It is rare for a solitary actinic keratosis to evolve to squamous cell carcinoma (SCC), but the risk of SCC occurring at some stage in a patient with more than 10 actinic keratoses is thought to be about 10 to 15%  A tender, thickened, ulcerated or enlarging actinic keratosis is suspicious of SCC  Actinic keratoses may be prevented by strict sun protection  If already present, keratoses may improve with a very high sun protection factor (50+) broad-spectrum sunscreen applied at least daily to affected areas, year-round  We recommend that UPF-rated clothing and hats and sunglasses be worn whenever possible and that a sunscreen-moisturizer combination product such as Neutrogena Daily Defense be applied at least three times a day  We performed a thorough discussion of treatment options and specific risk/benefits/alternatives including but not limited to medical “field” treatment with medications such as the following:    Topical “field area” medications such as 5-fluorouracil or Aldara (specifically, the trouble with long-term compliance, blistering and local skin reaction versus the convenience of at-home therapy and that field therapy “gets what is not yet seen”)      Cryotherapy (specifically, local pain, scarring, dyspigmentation, blistering, possible superinfection, and treats “only what we see” versus directed treatment today)  Photodynamic therapy (specifically, local pain, scarring, dyspigmentation, blistering, possible superinfection, need to schedule for a later date, and time spent in the office versus field therapy that “gets what is not yet seen”)

## 2022-11-22 NOTE — PROGRESS NOTES
[facility] Note     Patient Name: Amber Bee  Encounter Date: 11/22/2022    Have you been cared for by a [place] [address] Dermatologist in the last 3 years and, if so, which description applies to you? NO  I am considered a "new" patient and must complete all patient intake questions  I am MALE/not capable of bearing children  REVIEW OF SYSTEMS:  Have you recently had or currently have any of the following? · Recent fever or chills? No  · Any non-healing wound? YES, ear    PAST MEDICAL HISTORY:  Have you personally ever had or currently have any of the following? If "YES," then please provide more detail  · Skin cancer (such as Melanoma, Basal Cell Carcinoma, Squamous Cell Carcinoma? YES, basal cell   · Tuberculosis, HIV/AIDS, Hepatitis B or C: No  · Systemic Immunosuppression such as Diabetes, Biologic or Immunotherapy, Chemotherapy, Organ Transplantation, Bone Marrow Transplantation YES, diabetes   · Radiation Treatment No   FAMILY HISTORY:  Any "first degree relatives" (parent, brother, sister, or child) with the following? • Skin Cancer, Pancreatic or Other Cancer? No   PATIENT EXPERIENCE:    • Do you want the Dermatologist to perform a COMPLETE skin exam today including a clinical examination under the "bra and underwear" areas? NO  • If necessary, do we have your permission to call and leave a detailed message on your Preferred Phone number that includes your specific medical information?   Yes      No Known Allergies   Current Outpatient Medications:   •  allopurinol (ZYLOPRIM) 300 mg tablet, Take 1 tablet (300 mg total) by mouth daily, Disp: 90 tablet, Rfl: 3  •  amLODIPine (NORVASC) 10 mg tablet, TAKE 1 TABLET DAILY, Disp: 90 tablet, Rfl: 3  •  AMYLASE-LIPASE-PROTEASE PO, Take by mouth 2 (two) times a day before meals, Disp: , Rfl:   •  aspirin (ECOTRIN LOW STRENGTH) 81 mg EC tablet, Take 81 mg by mouth daily, Disp: , Rfl:   •  atorvastatin (LIPITOR) 80 mg tablet, TAKE 1 TABLET DAILY AT     BEDTIME, Disp: 90 tablet, Rfl: 3  •  calcitriol (ROCALTROL) 0 25 mcg capsule, Take 1 capsule (0 25 mcg total) by mouth 3 (three) times a week, Disp: 15 capsule, Rfl: 4  •  Cholecalciferol (VITAMIN D3 PO), Take 2,000 Units by mouth, Disp: , Rfl:   •  clopidogrel (PLAVIX) 75 mg tablet, Take 1 tablet (75 mg total) by mouth in the morning , Disp: 90 tablet, Rfl: 3  •  Cyanocobalamin (VITAMIN B12 PO), Take by mouth once a week  , Disp: , Rfl:   •  lisinopril (ZESTRIL) 10 mg tablet, Take 1 tablet (10 mg total) by mouth daily, Disp: 90 tablet, Rfl: 3  •  Magnesium Oxide (MAG-200 PO), Take by mouth once a week , Disp: , Rfl:   •  metoprolol succinate (TOPROL-XL) 25 mg 24 hr tablet, Take 0 5 tablets (12 5 mg total) by mouth daily, Disp: 45 tablet, Rfl: 1  •  Multiple Vitamin (MULTIVITAMIN ADULT PO), Take by mouth, Disp: , Rfl:   •  pantoprazole (PROTONIX) 40 mg tablet, TAKE 1 TABLET DAILY, Disp: 90 tablet, Rfl: 1  •  potassium chloride (K-DUR,KLOR-CON) 10 mEq tablet, Take 1 tablet (10 mEq total) by mouth daily, Disp: 90 tablet, Rfl: 3  •  torsemide (DEMADEX) 20 mg tablet, Take 1 tablet (20 mg total) by mouth 2 (two) times a day, Disp: 180 tablet, Rfl: 3  •  acetaminophen (TYLENOL) 325 mg tablet, Take 3 tablets (975 mg total) by mouth every 8 (eight) hours (Patient not taking: Reported on 10/7/2022), Disp: , Rfl: 0  •  docusate sodium (COLACE) 100 mg capsule, Take 1 capsule (100 mg total) by mouth 2 (two) times a day (Patient not taking: Reported on 9/21/2022), Disp: , Rfl: 0          • Whom besides the patient is providing clinical information about today's encounter?   o NO ADDITIONAL HISTORIAN (patient alone provided history)    Physical Exam and Assessment/Plan by Diagnosis:    NEOPLASM OF UNCERTAIN BEHAVIOR OF SKIN    Physical Exam:  • (Anatomic Location); (Size and Morphological Description); (Differential Diagnosis):  o Left superior helix; 0 5 cm crusted pink papule;diffdx SCC versus AK  • Pertinent Positives:  • Pertinent Negatives: Additional History of Present Condition:  Patient here for spot of concern states 3- 4 weeks ago he picked at area and lesion was bleeding and he could not gert it to stop so had to go to to ER  Assessment and Plan:  • I have discussed with the patient that a sample of skin via a "skin biopsy” would be potentially helpful to further make a specific diagnosis under the microscope  • Based on a thorough discussion of this condition and the management approach to it (including a comprehensive discussion of the known risks, side effects and potential benefits of treatment), the patient (family) agrees to implement the following specific plan:    o Procedure:  Skin Biopsy  After a thorough discussion of treatment options and risk/benefits/alternatives (including but not limited to local pain, scarring, dyspigmentation, blistering, possible superinfection, and inability to confirm a diagnosis via histopathology), verbal and written consent were obtained and portion of the rash was biopsied for tissue sample  See below for consent that was obtained from patient and subsequent Procedure Note  PROCEDURE TANGENTIAL (SHAVE) BIOPSY NOTE:    • Performing Physician: Gurpreet Gurrola   • Anatomic Location; Clinical Description with size (cm); Pre-Op Diagnosis:   o Left superior helix; 0 5 cm crusted pink papule;diffdx SCC versus AK  o   • Post-op diagnosis: Same     • Local anesthesia: 1% Lidocaine HCL     • Topical anesthesia: None    • Hemostasis: Electrocautery       After obtaining informed consent  at which time there was a discussion about the purpose of biopsy  and low risks of infection and bleeding  The area was prepped and draped in the usual fashion  Anesthesia was obtained with 1% lidocaine with epinephrine   A shave biopsy to an appropriate sampling depth was obtained by Shave (Dermablade or 15 blade) The resulting wound was covered with surgical ointment and bandaged appropriately  The patient tolerated the procedure well without complications and was without signs of functional compromise  Specimen has been sent for review by Dermatopathology  Standard post-procedure care has been explained and has been included in written form within the patient's copy of Informed Consent  INFORMED CONSENT DISCUSSION AND POST-OPERATIVE INSTRUCTIONS FOR PATIENT    I   RATIONALE FOR PROCEDURE  I understand that a skin biopsy allows the Dermatologist to test a lesion or rash under the microscope to obtain a diagnosis  It usually involves numbing the area with numbing medication and removing a small piece of skin; sometimes the area will be closed with sutures  In this specific procedure, sutures are not usually needed  If any sutures are placed, then they are usually need to be removed in 2 weeks or less  I understand that my Dermatologist recommends that a skin "shave" biopsy be performed today  A local anesthetic, similar to the kind that a dentist uses when filling a cavity, will be injected with a very small needle into the skin area to be sampled  The injected skin and tissue underneath "will go to sleep” and become numb so no pain should be felt afterwards  An instrument shaped like a tiny "razor blade" (shave biopsy instrument) will be used to cut a small piece of tissue and skin from the area so that a sample of tissue can be taken and examined more closely under the microscope  A slight amount of bleeding will occur, but it will be stopped with direct pressure and a pressure bandage and any other appropriate methods  I understands that a scar will form where the wound was created  Surgical ointment will be applied to help protect the wound  Sutures are not usually needed      II   RISKS AND POTENTIAL COMPLICATIONS   I understand the risks and potential complications of a skin biopsy include but are not limited to the following:  • Bleeding  • Infection  • Pain  • Scar/keloid  • Skin discoloration  • Incomplete Removal  • Recurrence  • Nerve Damage/Numbness/Loss of Function  • Allergic Reaction to Anesthesia  • Biopsies are diagnostic procedures and based on findings additional treatment or evaluation may be required  • Loss or destruction of specimen resulting in no additional findings    My Dermatologist has explained to me the nature of the condition, the nature of the procedure, and the benefits to be reasonably expected compared with alternative approaches  My Dermatologist has discussed the likelihood of major risks or complications of this procedure including the specific risks listed above, such as bleeding, infection, and scarring/keloid  I understand that a scar is expected after this procedure  I understand that my physician cannot predict if the scar will form a "keloid," which extends beyond the borders of the wound that is created  A keloid is a thick, painful, and bumpy scar  A keloid can be difficult to treat, as it does not always respond well to therapy, which includes injecting cortisone directly into the keloid every few weeks  While this usually reduces the pain and size of the scar, it does not eliminate it  I understand that photographs may be taken before and after the procedure  These will be maintained as part of the medical providers confidential records and may not be made available to me  I further authorize the medical provider to use the photographs for teaching purposes or to illustrate scientific papers, books, or lectures if in his/her judgment, medical research, education, or science may benefit from its use  I have had an opportunity to fully inquire about the risks and benefits of this procedure and its alternatives  I have been given ample time and opportunity to ask questions and to seek a second opinion if I wished to do so    I acknowledge that there have specifically been no guarantees as to the cosmetic results from the procedure  I am aware that with any procedure there is always the possibility of an unexpected complication  III  POST-PROCEDURAL CARE (WHAT YOU WILL NEED TO DO "AFTER THE BIOPSY" TO OPTIMIZE HEALING)    • Keep the area clean and dry  Try NOT to remove the bandage or get it wet for the first 24 hours  • Gently clean the area and apply surgical ointment (such as Vaseline petrolatum ointment, which is available "over the counter" and not a prescription) to the biopsy site for up to 2 weeks straight  This acts to protect the wound from the outside world  • Sutures are not usually placed in this procedure  If any sutures were placed, return for suture removal as instructed (generally 1 week for the face, 2 weeks for the body)  • Take Acetaminophen (Tylenol) for discomfort, if no contraindications  Ibuprofen or aspirin could make bleeding worse  • Call our office immediately for signs of infection: fever, chills, increased redness, warmth, tenderness, discomfort/pain, or pus or foul smell coming from the wound  WHAT TO DO IF THERE IS ANY BLEEDING? If a small amount of bleeding is noticed, place a clean cloth over the area and apply firm pressure for ten minutes  Check the wound after 10 minutes of direct pressure  If bleeding persists, try one more time for an additional 10 minutes of direct pressure on the area  If the bleeding becomes heavier or does not stop after the second attempt, or if you have any other questions about this procedure, then please call your [facility] Dermatologist by calling [phone] (SKIN)  I hereby acknowledge that I have reviewed and verified the site with my Dermatologist and have requested and authorized my Dermatologist to proceed with the procedure          ACTINIC KERATOSIS    Physical Exam:  • Anatomic Location Affected:  Right ear   • Morphological Description:  Scaly pink papules    Additional History of Present Condition:      Assessment and Plan:  Based on a thorough discussion of this condition and the management approach to it (including a comprehensive discussion of the known risks, side effects and potential benefits of treatment), the patient (family) agrees to implement the following specific plan:    • Cryotherapy done in office today   PROCEDURE:  DESTRUCTION OF PRE-MALIGNANT LESIONS  After a thorough discussion of treatment options and risk/benefits/alternatives (including but not limited to local pain, scarring, dyspigmentation, blistering, and possible superinfection), verbal and written consent were obtained and the aforementioned lesions were treated on with cryotherapy using liquid nitrogen x 1 cycle for 5-10 seconds  • TOTAL NUMBER of 1 pre-malignant lesions were treated today on the ANATOMIC LOCATION: right ear  The patient tolerated the procedure well, and after-care instructions were provided  Actinic keratoses are very common on sites repeatedly exposed to the sun, especially the backs of the hands and the face, most often affecting the ears, nose, cheeks, upper lip, vermilion of the lower lip, temples, forehead and balding scalp  In severely chronically sun-damaged individuals, they may also be found on the upper trunk, upper and lower limbs, and dorsum of feet  We discussed the theoretical premalignant (“pre-cancerous”) nature and etiology of these growths  We discussed the prevailing notion that actinic keratoses are a reflection of abnormal skin cell development due to DNA damage by short wavelength UVB  They are more likely to appear if the immune function is poor, due to aging, recent sun exposure, predisposing disease or certain drugs  We discussed that the main concern is that actinic keratoses may predispose to squamous cell carcinoma   It is rare for a solitary actinic keratosis to evolve to squamous cell carcinoma (SCC), but the risk of SCC occurring at some stage in a patient with more than 10 actinic keratoses is thought to be about 10 to 15%  A tender, thickened, ulcerated or enlarging actinic keratosis is suspicious of SCC  Actinic keratoses may be prevented by strict sun protection  If already present, keratoses may improve with a very high sun protection factor (50+) broad-spectrum sunscreen applied at least daily to affected areas, year-round  We recommend that UPF-rated clothing and hats and sunglasses be worn whenever possible and that a sunscreen-moisturizer combination product such as Neutrogena Daily Defense be applied at least three times a day  We performed a thorough discussion of treatment options and specific risk/benefits/alternatives including but not limited to medical “field” treatment with medications such as the following:    • Topical “field area” medications such as 5-fluorouracil or Aldara (specifically, the trouble with long-term compliance, blistering and local skin reaction versus the convenience of at-home therapy and that field therapy “gets what is not yet seen”)  • Cryotherapy (specifically, local pain, scarring, dyspigmentation, blistering, possible superinfection, and treats “only what we see” versus directed treatment today)  • Photodynamic therapy (specifically, local pain, scarring, dyspigmentation, blistering, possible superinfection, need to schedule for a later date, and time spent in the office versus field therapy that “gets what is not yet seen”)    Scribe Attestation    I,:  Maricel Méndez MA am acting as a scribe while in the presence of the attending physician :       I,:  Tommy Giles MD personally performed the services described in this documentation    as scribed in my presence :

## 2022-11-30 ENCOUNTER — OFFICE VISIT (OUTPATIENT)
Dept: OBGYN CLINIC | Facility: MEDICAL CENTER | Age: 77
End: 2022-11-30

## 2022-11-30 VITALS
HEART RATE: 61 BPM | WEIGHT: 177 LBS | SYSTOLIC BLOOD PRESSURE: 155 MMHG | HEIGHT: 70 IN | BODY MASS INDEX: 25.34 KG/M2 | DIASTOLIC BLOOD PRESSURE: 56 MMHG

## 2022-11-30 DIAGNOSIS — M79.672 LEFT FOOT PAIN: Primary | ICD-10-CM

## 2022-11-30 NOTE — PROGRESS NOTES
1  Left foot pain          No orders of the defined types were placed in this encounter  Impression:  Left foot pain likely multifactorial and secondary to bunion and vascular insufficiency  The patient is currently following with Podiatry and vascular surgery  He is scheduled to receive new footwear which would likely help  We discussed that he should always be wearing footwear and not walk barefoot outside  Patient has some fissuring on the palmar aspect of his foot  He should keep this clean  He should continue to follow-up with Podiatry and vascular surgery  He is scheduled to have vascular studies done  Imaging Studies (I personally reviewed images in PACS and report):  Left foot x-rays most recent to this encounter reviewed  These images show enthesopathic changes at Achilles insertion  Osteopenic appearing bones  Prominence of 5th MTP  No follow-ups on file  Patient is in agreement with the above plan  HPI:  Renny Henson  is a 76 y o  male  who presents for evaluation of   Chief Complaint   Patient presents with   • Left Foot - Pain     Onset/Mechanism: Chronic lateral foot pain  Location: Base of 5th metatarsal   Radiation: As above  Provocative: Weight bearing  Severity: Painful  Associated Symptoms: As above  Treatment so far: Podiatry consultations      Following history reviewed and updated:  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 ([facility] ) 09/10/2021   • Basal cell carcinoma     right cheek   • CAD (coronary artery disease)    • Carotid stenosis, asymptomatic, bilateral    • Chronic kidney disease    • Colon polyp    • Diabetes ([facility] )     type 2, non-insulin dependent   • Diabetes mellitus ([facility])    • GERD (gastroesophageal reflux disease)    • History of nephrolithiasis    • Hyperlipidemia    • Hypertension    • PAD (peripheral artery disease) ([facility])    • Severe aortic stenosis Past Surgical History:   Procedure Laterality Date   • APPENDECTOMY     • CARDIAC CATHETERIZATION N/A 05/05/2022    Procedure: CARDIAC RHC/LHC; Surgeon: Tano Young DO;  [facility]; Service: Cardiology   • CARDIAC CATHETERIZATION N/A 05/05/2022    Procedure: Cardiac Coronary Angiogram;  Surgeon: Tano Young DO;  [facility]; Service: Cardiology   • CARDIAC CATHETERIZATION N/A 05/24/2022    Procedure: Cardiac pci;  Surgeon: Afshan Dey MD;  [facility];   Service: Cardiology   • CARDIAC CATHETERIZATION N/A 06/14/2022    Procedure: CARDIAC TAVR;  Surgeon: Ruth Parish MD;  Location: [facility];  Service: Cardiology   • COLECTOMY     • COLONOSCOPY  2013   • CORONARY ARTERY BYPASS GRAFT  2013    X 2   • FEMORAL ARTERY - POPLITEAL ARTERY BYPASS GRAFT     • HEMORRHOID SURGERY     • IR AORTAGRAM WITH RUN-OFF  11/19/2018   • NH SLCTV CATHJ 3RD+ ORD SLCTV ABDL PEL/LXTR [facility] Left 08/12/2016    Procedure: LEFT FEMORAL ARTERIOGRAM; BALLOON ANGIOPLASTY; SFA  AND FEMORAL AT VEIN GRAFT;  Surgeon: Bia Aden MD;  Location[facility];  Service: Vascular   • NH TRANSCATHETER TRANSAPICAL REPLACEMT AORTIC VALVE N/A 06/14/2022    Procedure: REPLACEMENT AORTIC VALVE TRANSCATHETER (TAVR) TRANSAPICAL 29MM IRVIN KYLER S3 ULTRA VALVE(ACCESS ON LEFT) ELANA;  Surgeon: Lety Martinez DO;  Location[facility];  Service: Cardiac Surgery   • SKIN CANCER EXCISION     • TONSILLECTOMY AND ADENOIDECTOMY       Social History   Social History     Substance and Sexual Activity   Alcohol Use Yes    Comment: rare     Social History     Substance and Sexual Activity   Drug Use No     Social History     Tobacco Use   Smoking Status Every Day   • Packs/day: 0 25   • Years: 50 00   • Pack years: 12 50   • Types: Cigarettes   Smokeless Tobacco Never   Tobacco Comments    3-4 cigarettes daily     Family History   Problem Relation Age of Onset   • Heart attack Father • Other Sister         bypass and vlave replacement   • Stroke Paternal Uncle    • Arrhythmia Neg Hx    • Asthma Neg Hx    • Clotting disorder Neg Hx    • Fainting Neg Hx    • Anuerysm Neg Hx    • Hypertension Neg Hx         unsure    • Hyperlipidemia Neg Hx    • Heart failure Neg Hx      No Known Allergies     Constitutional:  /56   Pulse 61   Ht 5' 10" (1 778 m)   Wt 80 3 kg (177 lb)   BMI 25 40 kg/m²    General: NAD  Eyes: Anicteric sclerae  Neck: Supple  Lungs: Unlabored breathing  Cardiovascular: No lower extremity edema  Skin: Intact without erythema  Neurologic: Sensation intact to light touch  Psychiatric: Mood and affect are appropriate  Left Ankle Exam     Tenderness   Left ankle tenderness location: Fifth MTP joint bunion  Swelling: none    Other   Erythema: absent  Scars: absent  Sensation: decreased  Pulse: present    Comments:  Tailor's bunion               Procedures

## 2022-12-01 DIAGNOSIS — I10 ESSENTIAL HYPERTENSION: ICD-10-CM

## 2022-12-02 RX ORDER — AMLODIPINE BESYLATE 10 MG/1
TABLET ORAL
Qty: 90 TABLET | Refills: 3 | Status: SHIPPED | OUTPATIENT
Start: 2022-12-02

## 2022-12-02 NOTE — RESULT ENCOUNTER NOTE
DERMATOPATHOLOGY RESULT NOTE    Results reviewed by ordering physician  RESIDENTS: Please call patient and review results   Offer MOHS      Result & Plan by Specimen:    Specimen A: malignant  Plan: formerly [facility]

## 2022-12-06 ENCOUNTER — TELEPHONE (OUTPATIENT)
Dept: DERMATOLOGY | Facility: CLINIC | Age: 77
End: 2022-12-06

## 2022-12-07 ENCOUNTER — APPOINTMENT (OUTPATIENT)
Dept: LAB | Facility: MEDICAL CENTER | Age: 77
End: 2022-12-07

## 2022-12-07 DIAGNOSIS — N18.9 ACUTE KIDNEY INJURY SUPERIMPOSED ON CHRONIC KIDNEY DISEASE (HCC): ICD-10-CM

## 2022-12-07 DIAGNOSIS — N17.9 ACUTE KIDNEY INJURY SUPERIMPOSED ON CHRONIC KIDNEY DISEASE (HCC): ICD-10-CM

## 2022-12-07 DIAGNOSIS — N18.4 CKD (CHRONIC KIDNEY DISEASE) STAGE 4, GFR 15-29 ML/MIN (HCC): ICD-10-CM

## 2022-12-07 DIAGNOSIS — E83.42 HYPOMAGNESEMIA: ICD-10-CM

## 2022-12-07 DIAGNOSIS — N25.81 SECONDARY HYPERPARATHYROIDISM OF RENAL ORIGIN (HCC): ICD-10-CM

## 2022-12-07 DIAGNOSIS — C44.229 SQUAMOUS CELL CARCINOMA OF LEFT EAR: Primary | ICD-10-CM

## 2022-12-07 LAB
ALBUMIN SERPL BCP-MCNC: 3.6 G/DL (ref 3.5–5)
ALP SERPL-CCNC: 96 U/L (ref 46–116)
ALT SERPL W P-5'-P-CCNC: 18 U/L (ref 12–78)
ANION GAP SERPL CALCULATED.3IONS-SCNC: 6 MMOL/L (ref 4–13)
AST SERPL W P-5'-P-CCNC: 14 U/L (ref 5–45)
BILIRUB SERPL-MCNC: 0.58 MG/DL (ref 0.2–1)
BUN SERPL-MCNC: 45 MG/DL (ref 5–25)
CALCIUM SERPL-MCNC: 9.5 MG/DL (ref 8.3–10.1)
CHLORIDE SERPL-SCNC: 108 MMOL/L (ref 96–108)
CO2 SERPL-SCNC: 27 MMOL/L (ref 21–32)
CREAT SERPL-MCNC: 2.13 MG/DL (ref 0.6–1.3)
CREAT UR-MCNC: 150 MG/DL
ERYTHROCYTE [DISTWIDTH] IN BLOOD BY AUTOMATED COUNT: 15.7 % (ref 11.6–15.1)
GFR SERPL CREATININE-BSD FRML MDRD: 28 ML/MIN/1.73SQ M
GLUCOSE P FAST SERPL-MCNC: 154 MG/DL (ref 65–99)
HCT VFR BLD AUTO: 37.3 % (ref 36.5–49.3)
HGB BLD-MCNC: 11.5 G/DL (ref 12–17)
MAGNESIUM SERPL-MCNC: 2.4 MG/DL (ref 1.6–2.6)
MCH RBC QN AUTO: 29.3 PG (ref 26.8–34.3)
MCHC RBC AUTO-ENTMCNC: 30.8 G/DL (ref 31.4–37.4)
MCV RBC AUTO: 95 FL (ref 82–98)
PHOSPHATE SERPL-MCNC: 3.7 MG/DL (ref 2.3–4.1)
PLATELET # BLD AUTO: 187 THOUSANDS/UL (ref 149–390)
PMV BLD AUTO: 11 FL (ref 8.9–12.7)
POTASSIUM SERPL-SCNC: 4.2 MMOL/L (ref 3.5–5.3)
PROT SERPL-MCNC: 7.2 G/DL (ref 6.4–8.4)
PROT UR-MCNC: 27 MG/DL
PROT/CREAT UR: 0.18 MG/G{CREAT} (ref 0–0.1)
PTH-INTACT SERPL-MCNC: 141.8 PG/ML (ref 18.4–80.1)
RBC # BLD AUTO: 3.92 MILLION/UL (ref 3.88–5.62)
SODIUM SERPL-SCNC: 141 MMOL/L (ref 135–147)
WBC # BLD AUTO: 7.43 THOUSAND/UL (ref 4.31–10.16)

## 2022-12-08 ENCOUNTER — TELEPHONE (OUTPATIENT)
Dept: NEPHROLOGY | Facility: CLINIC | Age: 77
End: 2022-12-08

## 2022-12-08 NOTE — TELEPHONE ENCOUNTER
LM for patient about the following, asked him to call back if he has any questions:    ----- Message from Salome Warren PA-C sent at 12/7/2022  5:01 PM EST -----  Labs reviewed and stable  Please call patient and let him know his labs are stable    Results to be reviewed in detail at schedulef follow-up appointment with Dr Lynn Vasquez

## 2022-12-19 ENCOUNTER — TELEPHONE (OUTPATIENT)
Dept: GASTROENTEROLOGY | Facility: CLINIC | Age: 77
End: 2022-12-19

## 2022-12-19 DIAGNOSIS — K21.9 GASTROESOPHAGEAL REFLUX DISEASE, UNSPECIFIED WHETHER ESOPHAGITIS PRESENT: ICD-10-CM

## 2022-12-19 RX ORDER — PANTOPRAZOLE SODIUM 40 MG/1
TABLET, DELAYED RELEASE ORAL
Qty: 90 TABLET | Refills: 1 | Status: SHIPPED | OUTPATIENT
Start: 2022-12-19

## 2022-12-19 NOTE — TELEPHONE ENCOUNTER
Pt called back to confirm his appointment   He has some questions about what medications he can take the day of the procedure

## 2022-12-19 NOTE — TELEPHONE ENCOUNTER
I lmom returning pt's call again advising to hold Plavix 5 days prior to the procedure  I also informed he may take his other medications the morning of procedure a few hours before the arrival time  I asked pt to please call back if he has further questions

## 2022-12-19 NOTE — TELEPHONE ENCOUNTER
lmom confirming pt's egd scheduled on 12/29/22 at [facility] with Dr Pavan Coles  Informed [facility] would be calling 1-2 days prior with the arrival time  Informed would need to be npo after midnight and would need a  the day of the procedure due to being under sedation  I asked pt to please call back to let us know that he received message to hold his Plavix 5 days prior to the procedure  Will call pt again in a few days if do not hear back from him

## 2022-12-22 ENCOUNTER — TELEPHONE (OUTPATIENT)
Dept: DERMATOLOGY | Age: 77
End: 2022-12-22

## 2022-12-22 ENCOUNTER — TELEPHONE (OUTPATIENT)
Dept: DERMATOLOGY | Facility: CLINIC | Age: 77
End: 2022-12-22

## 2022-12-22 ENCOUNTER — OFFICE VISIT (OUTPATIENT)
Dept: DERMATOLOGY | Facility: CLINIC | Age: 77
End: 2022-12-22

## 2022-12-22 VITALS — WEIGHT: 177 LBS | BODY MASS INDEX: 25.34 KG/M2 | TEMPERATURE: 97.4 F | HEIGHT: 70 IN

## 2022-12-22 DIAGNOSIS — C44.229 SCC (SQUAMOUS CELL CARCINOMA), EAR, LEFT: Primary | ICD-10-CM

## 2022-12-22 DIAGNOSIS — L57.0 KERATOSIS, ACTINIC: ICD-10-CM

## 2022-12-22 DIAGNOSIS — Z79.2 PROPHYLACTIC ANTIBIOTIC: Primary | ICD-10-CM

## 2022-12-22 NOTE — PROGRESS NOTES
[facility] Note     Patient Name: Manuela Lesches  Encounter Date: 12/22/2022     Have you been cared for by a [place] [id number] Dermatologist in the last 3 years and, if so, which description applies to you? Yes  I have been here within the last 3 years, and my medical history has NOT changed since that time  I am MALE/not capable of bearing children  REVIEW OF SYSTEMS:  Have you recently had or currently have any of the following? · No changes in my recent health  PAST MEDICAL HISTORY:  Have you personally ever had or currently have any of the following? If "YES," then please provide more detail  · No changes in my medical history  FAMILY HISTORY:  Any "first degree relatives" (parent, brother, sister, or child) with the following? • No changes in my family's known health  PATIENT EXPERIENCE:    • Do you want the Dermatologist to perform a COMPLETE skin exam today including a clinical examination under the "bra and underwear" areas? NO  • If necessary, do we have your permission to call and leave a detailed message on your Preferred Phone number that includes your specific medical information?   Yes      No Known Allergies   Current Outpatient Medications:   •  acetaminophen (TYLENOL) 325 mg tablet, Take 3 tablets (975 mg total) by mouth every 8 (eight) hours, Disp: , Rfl: 0  •  allopurinol (ZYLOPRIM) 300 mg tablet, Take 1 tablet (300 mg total) by mouth daily, Disp: 90 tablet, Rfl: 3  •  amLODIPine (NORVASC) 10 mg tablet, TAKE 1 TABLET DAILY, Disp: 90 tablet, Rfl: 3  •  AMYLASE-LIPASE-PROTEASE PO, Take by mouth 2 (two) times a day before meals, Disp: , Rfl:   •  aspirin (ECOTRIN LOW STRENGTH) 81 mg EC tablet, Take 81 mg by mouth daily, Disp: , Rfl:   •  atorvastatin (LIPITOR) 80 mg tablet, TAKE 1 TABLET DAILY AT     BEDTIME, Disp: 90 tablet, Rfl: 3  •  calcitriol (ROCALTROL) 0 25 mcg capsule, Take 1 capsule (0 25 mcg total) by mouth 3 (three) times a week, Disp: 15 capsule, Rfl: 4  •  Cholecalciferol (VITAMIN D3 PO), Take 2,000 Units by mouth, Disp: , Rfl:   •  clopidogrel (PLAVIX) 75 mg tablet, Take 1 tablet (75 mg total) by mouth in the morning , Disp: 90 tablet, Rfl: 3  •  Cyanocobalamin (VITAMIN B12 PO), Take by mouth once a week  , Disp: , Rfl:   •  docusate sodium (COLACE) 100 mg capsule, Take 1 capsule (100 mg total) by mouth 2 (two) times a day, Disp: , Rfl: 0  •  lisinopril (ZESTRIL) 10 mg tablet, Take 1 tablet (10 mg total) by mouth daily, Disp: 90 tablet, Rfl: 3  •  Magnesium Oxide (MAG-200 PO), Take by mouth once a week , Disp: , Rfl:   •  metoprolol succinate (TOPROL-XL) 25 mg 24 hr tablet, Take 0 5 tablets (12 5 mg total) by mouth daily, Disp: 45 tablet, Rfl: 1  •  Multiple Vitamin (MULTIVITAMIN ADULT PO), Take by mouth, Disp: , Rfl:   •  pantoprazole (PROTONIX) 40 mg tablet, TAKE 1 TABLET DAILY, Disp: 90 tablet, Rfl: 1  •  potassium chloride (K-DUR,KLOR-CON) 10 mEq tablet, Take 1 tablet (10 mEq total) by mouth daily, Disp: 90 tablet, Rfl: 3  •  torsemide (DEMADEX) 20 mg tablet, Take 1 tablet (20 mg total) by mouth 2 (two) times a day, Disp: 180 tablet, Rfl: 3          • Whom besides the patient is providing clinical information about today's encounter?   o NO ADDITIONAL HISTORIAN (patient alone provided history)    Physical Exam and Assessment/Plan by Diagnosis:      1  SQUAMOUS CELL CARCINOMA     Physical Exam:  • Anatomic Location Affected:  Left superior helix  • Morphological Description:  Confirmed squamous cell carcinoma ([id number])  invasive, well differentiated  • Pertinent Positives:  • Pertinent Negatives: Additional History of Present Condition:  Biopsied 11/22/22  Patient requested follow up stating area that was biopsied is now healed and wanted to know if he still requires surgery       Assessment and Plan:  Based on a thorough discussion of this condition and the management approach to it (including a comprehensive discussion of the known risks, side effects and potential benefits of treatment), the patient (family) agrees to implement the following specific plan:  • Patient advised that Mohs surgery is still necessary  Patient aware referral is placed for Mohs surgery and someone from [facility] will be calling him to schedule  What is cutaneous squamous cell carcinoma? Cutaneous squamous cell carcinoma (SCC) is a common type of keratinocyte or non-melanoma skin cancer  It is derived from cells within the epidermis that make keratin -- the horny protein that makes up skin, hair and nails  Cutaneous SCC is an invasive disease, referring to cancer cells that have grown beyond the epidermis  SCC can sometimes metastasise and may prove fatal   Intraepidermal carcinoma (cutaneous SCC in situ) and mucosal SCC are considered elsewhere  Who gets cutaneous squamous cell carcinoma? Risk factors for cutaneous SCC include:  • Age and sex: SCCs are particularly prevalent in elderly males  However, they also affect females and younger adults  • Previous SCC or another form of skin cancer (basal cell carcinoma, melanoma) are a strong predictor for further skin cancers  • Actinic keratoses   • Outdoor occupation or recreation   • Smoking   • Fair skin, blue eyes and blond or red hair   • Previous cutaneous injury, thermal burn, disease (eg cutaneous lupus, epidermolysis bullosa, leg ulcer)   • Inherited syndromes: SCC is a particular problem for families with xeroderma pigmentosum and albinism   • Other risk factors include ionising radiation, exposure to arsenic, and immune suppression due to disease (eg chronic lymphocytic leukaemia) or medicines  Organ transplant recipients have a massively increased risk of developing SCC  •   What causes cutaneous squamous cell carcinoma? More than 90% of cases of SCC are associated with numerous DNA mutations in multiple somatic genes   Mutations in the p53 tumour suppressor gene are caused by exposure to ultraviolet radiation (UV), especially UVB (known as signature 7)  Other signature mutations relate to cigarette smoking, ageing and immune suppression (eg, to drugs such as azathioprine)  Mutations in signalling pathways affect the epidermal growth factor receptor, JEAN CLAUDE, Fyn, and [id number] signalling  Beta-genus human papillomaviruses (wart virus) are thought to play a role in SCC arising in immune-suppressed populations  ?-HPV and HPV subtypes 5, 8, 17, 20, 24, and 38 have also been associated with an increased risk of cutaneous SCC in immunocompetent individuals  What are the clinical features of cutaneous squamous cell carcinoma? Cutaneous SCCs present as enlarging scaly or crusted lumps  They usually arise within pre-existing actinic keratosis or intraepidermal carcinoma  • They grow over weeks to months   • They may ulcerate   • They are often tender or painful   • Located on sun-exposed sites, particularly the face, lips, ears, hands, forearms and lower legs   • Size varies from a few millimetres to several centimetres in diameter  Types of cutaneous squamous cell carcinoma  Distinct clinical types of invasive cutaneous SCC include:  • Cutaneous horn -- the horn is due to excessive production of keratin   • Keratoacanthoma (KA) -- a rapidly growing keratinising nodule that may resolve without treatment   • Carcinoma cuniculatum (‘verrucous carcinoma’), a slow-growing, warty tumour on the sole of the foot  • Multiple eruptive SCC/KA-like lesions arising in syndromes, such as multiple self-healing squamous epitheliomas of Robert-Smith and Grzybowski syndrome  The pathologist may classify a tumour as well differentiated, moderately well differentiated, poorly differentiated or anaplastic cutaneous SCC  There are other variants  Classification of squamous cell carcinoma by risk  Cutaneous SCC is classified as low-risk or high-risk, depending on the chance of tumour recurrence and metastasis   Characteristics of high-risk SCC include:  High-risk cutaneous squamous cell carcinoma has the following characteristics:  • Diameter greater than or equal to 2 cm   • Location on the ear, vermilion of the lip, central face, hands, feet, genitalia   • Arising in elderly or immune suppressed patient   • Histological thickness greater than 2 mm, poorly differentiated histology, or with the invasion of the subcutaneous tissue, nerves and blood vessels  Metastatic SCC is found in regional lymph nodes (80%), lungs, liver, brain, bones and skin  Staging cutaneous squamous cell carcinoma  In 2011, the [address] [place] [address] Ave on Cancer (AJCC) published a new staging systemic for cutaneous SCC for the 7th Edition of the AJCC manual  This evaluates the dimensions of the original primary tumour (T) and its metastases to lymph nodes (N)  Tumour staging for cutaneous SCC  TX: Th Primary tumour cannot be assessed  T0: No evidence of a primary tumour  Tis: Carcinoma in situ  T1: Tumour ? 2cm without high-risk features  T2: Tumour ? 2cm; or; Tumour ? 2 cm with high-risk features  T3: Tumour with the invasion of maxilla, mandible, orbit or temporal bone  T4: Tumour with the invasion of axial or appendicular skeleton or perineural invasion of skull base    Paul staging for cutaneous SCC  NX: Regional lymph nodes cannot be assessed  N0: No regional lymph node metastasis  N1: Metastasis in one local lymph node ? 3cm  N2: Metastasis in one local lymph node ? 3cm; or; Metastasis in >1 local lymph node ? 6cm  N3: Metastasis in lymph node ? 6cm    How is squamous cell carcinoma diagnosed? Diagnosis of cutaneous SCC is based on clinical features  The diagnosis and histological subtype are confirmed pathologically by diagnostic biopsy or following excision  See squamous cell carcinoma - pathology  Patients with high-risk SCC may also undergo staging investigations to determine whether it has spread to lymph nodes or elsewhere   These may include:  • Imaging using ultrasound scan, X-rays, CT scans, MRI scans   • Lymph node or other tissue biopsies    What is the treatment for cutaneous squamous cell carcinoma? Cutaneous SCC is nearly always treated surgically  Most cases are excised with a 3-10 mm margin of normal tissue around a visible tumour  A flap or skin graft may be needed to repair the defect  Other methods of removal include:  • Shave, curettage, and electrocautery for low-risk tumours on trunk and limbs   • Aggressive cryotherapy for very small, thin, low-risk tumours   • Mohs micrographic surgery for large facial lesions with indistinct margins or recurrent tumours   • Radiotherapy for an inoperable tumour, patients unsuitable for surgery, or as adjuvant    What is the treatment for advanced or metastatic squamous cell carcinoma? Locally advanced primary, recurrent or metastatic SCC requires multidisciplinary consultation  Often a combination of treatments is used  • Surgery   • Radiotherapy   • Cemiplimab   • Experimental targeted therapy using epidermal growth factor receptor inhibitors    How can cutaneous squamous cell carcinoma be prevented? There is a great deal of evidence to show that very careful sun protection at any time of life reduces the number of SCCs  This is particularly important in ageing, sun-damaged, fair skin; in patients that are immune suppressed; and in those who already have actinic keratoses or previous SCC  • Stay indoors or under the shade in the middle of the day   • Wear covering clothing   • Apply high protection factor SPF50+ broad-spectrum sunscreens generously to exposed skin if outdoors   • Avoid indoor tanning (sun beds, solaria)    Oral nicotinamide (vitamin B3) in a dose of 500 mg twice daily may reduce the number and severity of SCCs in people at high risk  Patients with multiple squamous cell carcinomas may be prescribed an oral retinoid (acitretin or isotretinoin)   These reduce the number of tumours but have some nuisance side effects  What is the outlook for cutaneous squamous cell carcinoma? Most SCCs are cured by treatment  A cure is most likely if treatment is undertaken when the lesion is small  The risk of recurrence or disease-associated death is greater for tumours that are > 20 mm in diameter and/or > 2 mm in thickness at the time of surgical excision  About 50% of people at high risk of SCC develop a second one within 5 years of the first  They are also at increased risk of other skin cancers, especially melanoma  Regular self-skin examinations and long-term annual skin checks by an experienced health professional are recommended  FOLLOW UP ACTINIC KERATOSIS    Physical Exam:  • Anatomic Location Affected:  Right ear  • Morphological Description:  Crusted papule    Additional History of Present Condition:  Treated with liquid nitrogen on 11/22/22    Assessment and Plan:  Based on a thorough discussion of this condition and the management approach to it (including a comprehensive discussion of the known risks, side effects and potential benefits of treatment), the patient (family) agrees to implement the following specific plan:    • We recommend that you apply plain Vaseline twice a day to right ear to help with crusting and healing  Actinic keratoses are very common on sites repeatedly exposed to the sun, especially the backs of the hands and the face, most often affecting the ears, nose, cheeks, upper lip, vermilion of the lower lip, temples, forehead and balding scalp  In severely chronically sun-damaged individuals, they may also be found on the upper trunk, upper and lower limbs, and dorsum of feet  We discussed the theoretical premalignant (“pre-cancerous”) nature and etiology of these growths  We discussed the prevailing notion that actinic keratoses are a reflection of abnormal skin cell development due to DNA damage by short wavelength UVB    They are more likely to appear if the immune function is poor, due to aging, recent sun exposure, predisposing disease or certain drugs  We discussed that the main concern is that actinic keratoses may predispose to squamous cell carcinoma  It is rare for a solitary actinic keratosis to evolve to squamous cell carcinoma (SCC), but the risk of SCC occurring at some stage in a patient with more than 10 actinic keratoses is thought to be about 10 to 15%  A tender, thickened, ulcerated or enlarging actinic keratosis is suspicious of SCC  Actinic keratoses may be prevented by strict sun protection  If already present, keratoses may improve with a very high sun protection factor (50+) broad-spectrum sunscreen applied at least daily to affected areas, year-round  We recommend that UPF-rated clothing and hats and sunglasses be worn whenever possible and that a sunscreen-moisturizer combination product such as Neutrogena Daily Defense be applied at least three times a day  We performed a thorough discussion of treatment options and specific risk/benefits/alternatives including but not limited to medical “field” treatment with medications such as the following:    • Topical “field area” medications such as 5-fluorouracil or Aldara (specifically, the trouble with long-term compliance, blistering and local skin reaction versus the convenience of at-home therapy and that field therapy “gets what is not yet seen”)  • Cryotherapy (specifically, local pain, scarring, dyspigmentation, blistering, possible superinfection, and treats “only what we see” versus directed treatment today)  • Photodynamic therapy (specifically, local pain, scarring, dyspigmentation, blistering, possible superinfection, need to schedule for a later date, and time spent in the office versus field therapy that “gets what is not yet seen”)                  Scribe Attestation    I,:  Michaelle Pulido am acting as a scribe while in the presence of the attending physician :       I,:  Chidi Hobson MD personally performed the services described in this documentation    as scribed in my presence :

## 2022-12-22 NOTE — TELEPHONE ENCOUNTER
Patient in for follow up to biopsy that was done 11/22/22  Patient confirmed SCC and a referral was placed for Mohs  Can you please reach out to patient to schedule? Thank you!

## 2022-12-22 NOTE — LETTER
Carmela Sanchez      1945    [address] [id number]    Dear Carmela Sanchez ,    You are scheduled to have the MOHS procedure on 1/11/23 at 8 am for left ear with Trevon Collierek  Our office is located in [facility] our address is [address] [place] [address]  Once you arrive please check in with our front staff in [address] and they will escort you to the [place] [address] waiting room  If you have someone bringing you to your appointment they may wait in the waiting room or accompany you in your visit  Below you will find some pre-op instructions along with some information regarding the MOHS procedure  If you have any questions please call our office at [phone]  Thank you,    [facility] MOHS Department         PRE-OPERATIVE INSTRUCTIONS - MOHS    Before your scheduled surgery, there are a number of important precautions and positive steps you should take to help prepare yourself for a successful treatment and speedy recovery  Some of the steps, which are listed below, may seem unnecessary and inconvenient, but they are important  For example, when you stop smoking, you increase your ability to heal  Occasionally, there may be valid reasons, personal or medical, why you can't comply  In such cases, please call the office so we can discuss possible ways to overcome any obstacles you may be encountering  If you have any questions about the surgery, or remember additional medical information that you forgot to mention to our staff, please contact the office prior to your surgery  GENERAL INFORMATION REGARDING MOHS MICROGRAPHIC SURGERY    Mohs surgery is a specialized technique for the removal of skin cancer developed by Dr Aurelia Sayres Mohs over 50 years ago to improve the cure rates of skin cancer   Traditionally, skin cancers are treated by destructive methods (radiation, freezing, scraping, and burning) or excision (cutting out the tissue with standards margins and sending it to an outside laboratory for testing)  These methods all yield cure rates between 65%-94%  However, for cancers located in cosmetically sensitive areas, large tumors, or tumors unsuccessfully treated by other means, Mohs surgery offers a higher cure rate  In most cases, Mohs surgery provides you with a 99% cure rate for primary (previously untreated) basal cell cancer and a 95% cure rate for primary squamous cell cancer  In Mohs surgery, tissue is removed and processed in a way that we are able to check 100% of the margins, giving the highest cure rate for any method of treating skin cancers while providing maximal preservation of normal skin  This allows the surgeon to produce an optimal cosmetic result for the patient by maximizing the amount of tissue removed yielding as small a scar as possible    On the day of surgery, you will be given local anesthesia only (similar to what was given to you during your initial biopsy)  You will remain awake  You will verify the location of the skin cancer prior to the onset of the surgery  Once the area is numb, the tissue containing the skin cancer will be removed, taking a small safety margin  This margin is usually smaller than what would be taken with a standard excision  Once the tissue is removed, it is marked and oriented  The first layer (“Stage I”) will be processed in our laboratory  The wound will be treated for bleeding and a bandage will be placed to keep you comfortable while you wait an approximate 45 minutes-1 hour (for the processing of the tissue) in your room  Your Mohs surgeon will examine the pathology in the lab, checking all the margins  If any tumor remains, you will need to take a second layer of skin (“Stage 2”)  The area will be re-anesthetized and your Mohs surgeon will remove more skin only in the area where the tumor exists   This process will continue until all the skin cancer is removed  Unfortunately, there is no method to predict how many layers or stages will be taken  Once the tumor has been removed completely, we will discuss the best ways to close the defect  Most wounds may be closed with stitches  A larger wound may require a skin graft or a flap  In rare instances, especially for cancers around the eye or for larger cancers, we may work with another surgeon (oculoplastic, ENT, plastics) with special skills to assist with reconstruction  Medications: Please take all your normal medications the morning of your surgery  If you are a diabetic, please bring your insulin or medications with you, as well as a snack to avoid having low blood sugar during your day with us  1)  Blood Thinners  - Most people should stop all aspirins, aspirin-containing medications ([place], Anacin, Ecotrin, etc), and non-steroidal anti-inflammatory medications (Motrin, Naproxen, Advil, Midol, Aleve, etc ) for 7 days prior to your scheduled surgery and 2 days after (unless instructed otherwise after surgery)  You may take Tylenol for pain  - VERY IMPORTANT: If you take aspirin because you have had a stroke, heart attack, heart disease, other condition, or your physician has prescribed you to take it, please continue your aspirin     - Ask the doctor (that prescribed the medication) if prior to surgery you should stop your prescribed blood thinners, such as Coumadin/Warfarin, Plavix, or Aggrenox  NEVER stop them without your doctor's permission or knowledge  If you have had a stroke, heart attack, or have an irregular heartbeat, your doctor may want you to continue your medication  We can still do your surgery  You may have more bruising  2) Antibiotics  - If you usually require antibiotics prior to dental work, please let the office know at least 24 hours prior to your surgery   Medical conditions that sometimes require preoperative antibiotics include artificial heart valves, heart murmurs, artificial joints, and related problems  We will give you a different medication than the dentist, so please contact us for the correct antibiotic   - If you were prescribed pre-operative antibiotics by our office, please take the medication 2 hours prior to your procedure  3) Vitamins and Supplements  - Avoid taking any supplements with Vitamin E, Fish Oil, Gingko, Ginseng, and Garlic for 2 weeks before and 2 days after your surgery  These thin your blood    Alcohol: Avoid drinking alcohol for 2 days prior to your surgery, and for 2 days afterwards (it thins the blood and causes more bruising and swelling)  Smoking: Try to STOP or reduce smoking significantly the week before your surgery, and especially the week afterwards (it greatly improves how well you heal)  Tobacco smoke deprives the blood of oxygen, which is urgently needed by the wound during the healing process  Contact Lenses: Do not wear them on the day of the surgery  Instead, wear glasses and bring your case, in case we need to remove them  Clothing: Do not wear your nicest clothing on your surgery day  We recommend wearing a button down shirt that will not disrupt your post-operative dressing when changing later that night  Bathing: On the morning of your surgery, you may bathe or shower normally  If you get your hair done on a weekly basis, remember to get your hair washed the day before surgery  You will need to keep your surgical site dry for a minimum of 48 hours  Makeup: If your surgery is on the face, please do not wear any makeup on the day of the surgery  Jewelry: Please try to avoid wearing jewelry on the day of surgery  Food: On the morning of surgery, have breakfast but limit your intake of caffeinated beverages  They are diuretic and may inconvenience you during surgery   If you are following up with another surgeon the same day as your Mohs surgery, you must receive permission to eat breakfast from that surgeon  What to bring with you on the day of your surgery:  ? Bring snacks - Since you could be at the office long, you may bring snacks and/or lunch with you  Some snacks and drinks are available at the office as well    ? Bring a sweater - Bring a sweater or jacket that buttons or zips down the front and will not disturb your wound dressing during removal   ? Bring something to do - You will be spending much of the day in our office  There will be 45-60 minute waiting periods  between layers/stages, and while there is a television with cable in every room, it is nice to have something to keep you occupied such as books, magazines, knitting, music, or work  Planning Ahead:  ? Other Appointments - It is important to realize that no matter how small the skin cancer appears to be, looks can be deceiving  Since your surgery may last the entire day, you should not schedule any other appointments that day  ? Special Occasions - Surgery often creates swelling and bruising  Also, the post-op dressing may be rather large and obvious  Keep this in mind as you arrange your social/work schedule  If an important event is already planned, please check with your referring physician or your Mohs surgeon to see if the surgery can be postponed  ? Activity Limits after Surgery - If surgery was performed on your face, we recommend that you keep your activity level to a minimum for 2-3 days (the blood pressure elevation related to exercise can lead to bleeding)  If you have stitches in an area that will be under tension or significant movement (neck, back, arms, legs), you will need to avoid heavy lifting (anything over 5 lbs) or exercise for at least 2 weeks and possibly longer  We also advise that you limit out of town travel for the first 7 days after surgery  You should also wait at least 7 days before going into a pool or the ocean  ?  Housework - Since you will need to minimize activity after surgery, plan to do your groceries, laundry, gardening, and other heavy household chores prior to your surgery  Please make arrangements for assistance during the post-op period  If surgery is around your mouth area, you may need to eat soft foods, such as soup, milkshakes, or yogurt for 48 hours  Purchasing bandage supplies: Prior to surgery, please purchase the following items to care for your surgical wound properly   - Cotton swabs (Q-tips)  - Vaseline or Aquaphor  - Telfa pads (or any non-stick dressing)  - Paper tape or Hypafix tape  - Gauze pads (3x3)      We will provide you with some bandage supplies after surgery to get you started  Transportation: It is often reassuring and comforting to have a  drive you to and from the surgery  He or she is welcome to wait in the office during the surgery  Please note that for safety reasons, only the patient is allowed in the procedure room during surgery  Thank you for your cooperation  Rescheduling: If you need to reschedule your surgery, please notify the office as soon as possible

## 2022-12-22 NOTE — TELEPHONE ENCOUNTER
CHARLOTTE recd from patient stating he has an appt today with dr Arik Moses at 5 and with the weather would like to know if the office will still be open   Also if there are any cancellations for a sooner time       Returned call and informed him as of now the office is still open and I will send send a message to [facility] team advising them to reach out if anyone cancels sooner

## 2022-12-22 NOTE — PATIENT INSTRUCTIONS
SQUAMOUS CELL CARCINOMA     Assessment and Plan:  Based on a thorough discussion of this condition and the management approach to it (including a comprehensive discussion of the known risks, side effects and potential benefits of treatment), the patient (family) agrees to implement the following specific plan:  Patient advised that Mohs surgery is still necessary  Patient aware referral is placed for Mohs surgery and someone from Mohs will be calling him to schedule  What is cutaneous squamous cell carcinoma? Cutaneous squamous cell carcinoma (SCC) is a common type of keratinocyte or non-melanoma skin cancer  It is derived from cells within the epidermis that make keratin -- the horny protein that makes up skin, hair and nails  Cutaneous SCC is an invasive disease, referring to cancer cells that have grown beyond the epidermis  SCC can sometimes metastasise and may prove fatal   Intraepidermal carcinoma (cutaneous SCC in situ) and mucosal SCC are considered elsewhere  Who gets cutaneous squamous cell carcinoma? Risk factors for cutaneous SCC include:  Age and sex: SCCs are particularly prevalent in elderly males  However, they also affect females and younger adults  Previous SCC or another form of skin cancer (basal cell carcinoma, melanoma) are a strong predictor for further skin cancers  Actinic keratoses   Outdoor occupation or recreation   Smoking   Fair skin, blue eyes and blond or red hair   Previous cutaneous injury, thermal burn, disease (eg cutaneous lupus, epidermolysis bullosa, leg ulcer)   Inherited syndromes: SCC is a particular problem for families with xeroderma pigmentosum and albinism   Other risk factors include ionising radiation, exposure to arsenic, and immune suppression due to disease (eg chronic lymphocytic leukaemia) or medicines  Organ transplant recipients have a massively increased risk of developing SCC  What causes cutaneous squamous cell carcinoma?   More than 90% of cases of SCC are associated with numerous DNA mutations in multiple somatic genes  Mutations in the p53 tumour suppressor gene are caused by exposure to ultraviolet radiation (UV), especially UVB (known as signature 7)  Other signature mutations relate to cigarette smoking, ageing and immune suppression (eg, to drugs such as azathioprine)  Mutations in signalling pathways affect the epidermal growth factor receptor, JEAN CLAUDE, Fyn, and [id number] signalling  Beta-genus human papillomaviruses (wart virus) are thought to play a role in SCC arising in immune-suppressed populations  ?-HPV and HPV subtypes 5, 8, 17, 20, 24, and 38 have also been associated with an increased risk of cutaneous SCC in immunocompetent individuals  What are the clinical features of cutaneous squamous cell carcinoma? Cutaneous SCCs present as enlarging scaly or crusted lumps  They usually arise within pre-existing actinic keratosis or intraepidermal carcinoma  They grow over weeks to months   They may ulcerate   They are often tender or painful   Located on sun-exposed sites, particularly the face, lips, ears, hands, forearms and lower legs   Size varies from a few millimetres to several centimetres in diameter  Types of cutaneous squamous cell carcinoma  Distinct clinical types of invasive cutaneous SCC include:  Cutaneous horn -- the horn is due to excessive production of keratin   Keratoacanthoma (KA) -- a rapidly growing keratinising nodule that may resolve without treatment   Carcinoma cuniculatum (‘verrucous carcinoma’), a slow-growing, warty tumour on the sole of the foot  Multiple eruptive SCC/KA-like lesions arising in syndromes, such as multiple self-healing squamous epitheliomas of Robert-Smith and Grzybowski syndrome  The pathologist may classify a tumour as well differentiated, moderately well differentiated, poorly differentiated or anaplastic cutaneous SCC  There are other variants      Classification of squamous cell carcinoma by risk  Cutaneous SCC is classified as low-risk or high-risk, depending on the chance of tumour recurrence and metastasis  Characteristics of high-risk SCC include:  High-risk cutaneous squamous cell carcinoma has the following characteristics:  Diameter greater than or equal to 2 cm   Location on the ear, vermilion of the lip, central face, hands, feet, genitalia   Arising in elderly or immune suppressed patient   Histological thickness greater than 2 mm, poorly differentiated histology, or with the invasion of the subcutaneous tissue, nerves and blood vessels  Metastatic SCC is found in regional lymph nodes (80%), lungs, liver, brain, bones and skin  Staging cutaneous squamous cell carcinoma  In 2011, the [address] [place] [address] Ave on Cancer (CC) published a new staging systemic for cutaneous SCC for the 7th Edition of the AJCC manual  This evaluates the dimensions of the original primary tumour (T) and its metastases to lymph nodes (N)  Tumour staging for cutaneous SCC  TX: Th Primary tumour cannot be assessed  T0: No evidence of a primary tumour  Tis: Carcinoma in situ  T1: Tumour ? 2cm without high-risk features  T2: Tumour ? 2cm; or; Tumour ? 2 cm with high-risk features  T3: Tumour with the invasion of maxilla, mandible, orbit or temporal bone  T4: Tumour with the invasion of axial or appendicular skeleton or perineural invasion of skull base    Paul staging for cutaneous SCC  NX: Regional lymph nodes cannot be assessed  N0: No regional lymph node metastasis  N1: Metastasis in one local lymph node ? 3cm  N2: Metastasis in one local lymph node ? 3cm; or; Metastasis in >1 local lymph node ? 6cm  N3: Metastasis in lymph node ? 6cm    How is squamous cell carcinoma diagnosed? Diagnosis of cutaneous SCC is based on clinical features  The diagnosis and histological subtype are confirmed pathologically by diagnostic biopsy or following excision  See squamous cell carcinoma - pathology    Patients with high-risk SCC may also undergo staging investigations to determine whether it has spread to lymph nodes or elsewhere  These may include:  Imaging using ultrasound scan, X-rays, CT scans, MRI scans   Lymph node or other tissue biopsies    What is the treatment for cutaneous squamous cell carcinoma? Cutaneous SCC is nearly always treated surgically  Most cases are excised with a 3-10 mm margin of normal tissue around a visible tumour  A flap or skin graft may be needed to repair the defect  Other methods of removal include:  Shave, curettage, and electrocautery for low-risk tumours on trunk and limbs   Aggressive cryotherapy for very small, thin, low-risk tumours   Mohs micrographic surgery for large facial lesions with indistinct margins or recurrent tumours   Radiotherapy for an inoperable tumour, patients unsuitable for surgery, or as adjuvant    What is the treatment for advanced or metastatic squamous cell carcinoma? Locally advanced primary, recurrent or metastatic SCC requires multidisciplinary consultation  Often a combination of treatments is used  Surgery   Radiotherapy   Cemiplimab   Experimental targeted therapy using epidermal growth factor receptor inhibitors    How can cutaneous squamous cell carcinoma be prevented? There is a great deal of evidence to show that very careful sun protection at any time of life reduces the number of SCCs  This is particularly important in ageing, sun-damaged, fair skin; in patients that are immune suppressed; and in those who already have actinic keratoses or previous SCC  Stay indoors or under the shade in the middle of the day   Wear covering clothing   Apply high protection factor SPF50+ broad-spectrum sunscreens generously to exposed skin if outdoors   Avoid indoor tanning (sun beds, solaria)    Oral nicotinamide (vitamin B3) in a dose of 500 mg twice daily may reduce the number and severity of SCCs in people at high risk    Patients with multiple squamous cell carcinomas may be prescribed an oral retinoid (acitretin or isotretinoin)  These reduce the number of tumours but have some nuisance side effects  What is the outlook for cutaneous squamous cell carcinoma? Most SCCs are cured by treatment  A cure is most likely if treatment is undertaken when the lesion is small  The risk of recurrence or disease-associated death is greater for tumours that are > 20 mm in diameter and/or > 2 mm in thickness at the time of surgical excision  About 50% of people at high risk of SCC develop a second one within 5 years of the first  They are also at increased risk of other skin cancers, especially melanoma  Regular self-skin examinations and long-term annual skin checks by an experienced health professional are recommended  FOLLOW UP ACTINIC KERATOSIS    Assessment and Plan:  Based on a thorough discussion of this condition and the management approach to it (including a comprehensive discussion of the known risks, side effects and potential benefits of treatment), the patient (family) agrees to implement the following specific plan: We recommend that you apply plain Vaseline twice a day to right ear to help with crusting and healing  Actinic keratoses are very common on sites repeatedly exposed to the sun, especially the backs of the hands and the face, most often affecting the ears, nose, cheeks, upper lip, vermilion of the lower lip, temples, forehead and balding scalp  In severely chronically sun-damaged individuals, they may also be found on the upper trunk, upper and lower limbs, and dorsum of feet  We discussed the theoretical premalignant (“pre-cancerous”) nature and etiology of these growths  We discussed the prevailing notion that actinic keratoses are a reflection of abnormal skin cell development due to DNA damage by short wavelength UVB    They are more likely to appear if the immune function is poor, due to aging, recent sun exposure, predisposing disease or certain drugs  We discussed that the main concern is that actinic keratoses may predispose to squamous cell carcinoma  It is rare for a solitary actinic keratosis to evolve to squamous cell carcinoma (SCC), but the risk of SCC occurring at some stage in a patient with more than 10 actinic keratoses is thought to be about 10 to 15%  A tender, thickened, ulcerated or enlarging actinic keratosis is suspicious of SCC  Actinic keratoses may be prevented by strict sun protection  If already present, keratoses may improve with a very high sun protection factor (50+) broad-spectrum sunscreen applied at least daily to affected areas, year-round  We recommend that UPF-rated clothing and hats and sunglasses be worn whenever possible and that a sunscreen-moisturizer combination product such as Neutrogena Daily Defense be applied at least three times a day  We performed a thorough discussion of treatment options and specific risk/benefits/alternatives including but not limited to medical “field” treatment with medications such as the following:    Topical “field area” medications such as 5-fluorouracil or Aldara (specifically, the trouble with long-term compliance, blistering and local skin reaction versus the convenience of at-home therapy and that field therapy “gets what is not yet seen”)  Cryotherapy (specifically, local pain, scarring, dyspigmentation, blistering, possible superinfection, and treats “only what we see” versus directed treatment today)  Photodynamic therapy (specifically, local pain, scarring, dyspigmentation, blistering, possible superinfection, need to schedule for a later date, and time spent in the office versus field therapy that “gets what is not yet seen”)

## 2022-12-27 ENCOUNTER — TELEPHONE (OUTPATIENT)
Dept: GASTROENTEROLOGY | Facility: CLINIC | Age: 77
End: 2022-12-27

## 2022-12-27 NOTE — TELEPHONE ENCOUNTER
Patients GI provider:  Dr Rodriguez [place]    Number to return call: [phone]     Reason for call: Pt called and stated he is getting sore throat and he is wondering if he should still have his egd?  Please reach out to patient thank you     Scheduled procedure/appointment date if applicable: procedure 38/99/8641

## 2022-12-27 NOTE — TELEPHONE ENCOUNTER
Scheduled date of EGD(as of today): 2/8/23    Physician performing EGD: Dr Moses Dodge    Location of EGD: [facility]

## 2022-12-29 RX ORDER — AMOXICILLIN 500 MG/1
CAPSULE ORAL
Qty: 4 CAPSULE | Refills: 0 | OUTPATIENT
Start: 2022-12-29 | End: 2022-12-29

## 2022-12-29 NOTE — TELEPHONE ENCOUNTER
Pre- operative Mohs Telephone Scheduling Note    Do you have a pacemaker or defibrillator? no    Do you take antibiotics before skin or dental procedures? yes: amoxicillin valve replacement, patient needs  If yes, will likely require pre-operative antibiotics  Ask  the patient why they take the antibiotics (usually because of joint replacement)  Do you have a history of a joint replacements within the past 2 years? no   If yes, will likely require pre-operative antibiotics  Ask if orthopaedic surgeon has prescribed pre-operative antibiotics to take before procedures/dental work? Do you take any OTC medications that thin your blood (Aspirin, Aleve, Ibuprofen) or supplements that thin your blood (fish oil, garlic, vitamin E, Ginko Biloba)? yes: 81 mg ASA    Do you take any prescribed medications that thin your blood (Coumadin, Plavix, Xarelto, Eliquis or another prescribed blood thinner)? yes: Plavix    Do you have an allergy to lidocaine or epinephrine? no    Do you have an allergy to shellfish? no    Do you have allergies to Iodine? no    Do you wear a lidocaine patch? no    Have you ever been diagnosed with HIV, AIDS, Hep B and Hep C? no    Do you use a cane, walker or wheelchair? no    Is the patient from a nursing home? no     Do you smoke? yes: 1 pack per day      If yes,  patient to try and stop 2 days before surgery and 7 days after the surgery  Minimizing smoking as much as possible during this time will improve healing and the cosmetic result after surgery  Date scheduled: 1/11/2023 @ 8 am with Dr Pal Rosario of Care with other provider (Janis Castañeda, ENT) required? no   IF YES, PLEASE FORWARD TO APPROPRIATE PERSONNEL TO HELP COORDINATE  Are there remaining tumors to be scheduled? no    Was Prior Authorization obtained?  No Medicare (please use  [email or web address] to document prior auth)

## 2022-12-30 ENCOUNTER — TELEPHONE (OUTPATIENT)
Dept: OTHER | Facility: OTHER | Age: 77
End: 2022-12-30

## 2022-12-30 NOTE — TELEPHONE ENCOUNTER
[facility] and fax number  Information was provided to them

## 2023-01-04 ENCOUNTER — TELEPHONE (OUTPATIENT)
Dept: NEPHROLOGY | Facility: CLINIC | Age: 78
End: 2023-01-04

## 2023-01-05 ENCOUNTER — TELEPHONE (OUTPATIENT)
Dept: DERMATOLOGY | Facility: CLINIC | Age: 78
End: 2023-01-05

## 2023-01-05 ENCOUNTER — OFFICE VISIT (OUTPATIENT)
Dept: NEPHROLOGY | Facility: CLINIC | Age: 78
End: 2023-01-05

## 2023-01-05 VITALS
HEART RATE: 57 BPM | SYSTOLIC BLOOD PRESSURE: 130 MMHG | WEIGHT: 176 LBS | BODY MASS INDEX: 25.2 KG/M2 | DIASTOLIC BLOOD PRESSURE: 50 MMHG | HEIGHT: 70 IN

## 2023-01-05 DIAGNOSIS — N18.4 CKD (CHRONIC KIDNEY DISEASE) STAGE 4, GFR 15-29 ML/MIN (HCC): Primary | ICD-10-CM

## 2023-01-05 DIAGNOSIS — N25.81 SECONDARY HYPERPARATHYROIDISM OF RENAL ORIGIN (HCC): ICD-10-CM

## 2023-01-05 DIAGNOSIS — E79.0 HYPERURICEMIA: ICD-10-CM

## 2023-01-05 DIAGNOSIS — R80.1 PERSISTENT PROTEINURIA, UNSPECIFIED: ICD-10-CM

## 2023-01-05 DIAGNOSIS — I50.32 CHRONIC DIASTOLIC CHF (CONGESTIVE HEART FAILURE) (HCC): ICD-10-CM

## 2023-01-05 DIAGNOSIS — I12.9 BENIGN HYPERTENSION WITH CHRONIC KIDNEY DISEASE, STAGE IV (HCC): ICD-10-CM

## 2023-01-05 DIAGNOSIS — N18.4 BENIGN HYPERTENSION WITH CHRONIC KIDNEY DISEASE, STAGE IV (HCC): ICD-10-CM

## 2023-01-05 DIAGNOSIS — N18.4 CONTROLLED TYPE 2 DIABETES MELLITUS WITH STAGE 4 CHRONIC KIDNEY DISEASE, WITHOUT LONG-TERM CURRENT USE OF INSULIN (HCC): ICD-10-CM

## 2023-01-05 DIAGNOSIS — D50.9 IRON DEFICIENCY ANEMIA, UNSPECIFIED IRON DEFICIENCY ANEMIA TYPE: ICD-10-CM

## 2023-01-05 DIAGNOSIS — E11.22 CONTROLLED TYPE 2 DIABETES MELLITUS WITH STAGE 4 CHRONIC KIDNEY DISEASE, WITHOUT LONG-TERM CURRENT USE OF INSULIN (HCC): ICD-10-CM

## 2023-01-05 PROBLEM — D63.1 ANEMIA DUE TO STAGE 4 CHRONIC KIDNEY DISEASE (HCC): Status: ACTIVE | Noted: 2023-01-05

## 2023-01-05 RX ORDER — CALCITRIOL 0.25 UG/1
0.25 CAPSULE, LIQUID FILLED ORAL 3 TIMES WEEKLY
Qty: 36 CAPSULE | Refills: 4 | Status: SHIPPED | OUTPATIENT
Start: 2023-01-06

## 2023-01-05 NOTE — TELEPHONE ENCOUNTER
Patient left a message on VM with questions regarding his upcoming Mohs surgery  Stated he needs to know about taking his medications and confirm the appointment as well as the abx that were to be sent to his pharmacy  Returned patient's call and left message with CB number to review information requested  Abx order was signed to the patient's pharmacy today as well

## 2023-01-05 NOTE — PROGRESS NOTES
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 and age-related nephron loss plus episodes of acute kidney injury  -Plan:   • Continue to monitor renal function   Renal function was stable, last creatinine was 2 13 in December 2022 with stable electrolytes  •  Avoid Nephrotoxins like NSAIDs and IV contrast    • CKD Education: not interested   He mentions has been watching [employer] videos  Still not interested   • Will repeat BMP in 4 months and follow-up in 4 months       Primary Hypertension with chronic kidney disease stage 4:   -Current medication:  Amlodipine 10 mg daily, lisinopril 10 mg, Metoprolol 12 5 mg bid, Torsemide 10 mg   -Current blood pressure: BP stable and is at goal  -Plan:    ? BP stable and is at goal  ? Continue same medication    -Recommend 2 g sodium diet     -Recommend daily exercise and weight loss  -Discussed home monitoring of BP and maintaining a log of blood pressure   -Recommend goal BP less than 130/80       Chronic diastolic CHF/severe aortic stenosis status post TAVR  -Echo from 6/2022- was EF 60 %  Diastolic dysfunction  -dose of torsemide not clear  As per patient he was taking torsemide 10 mg daily    euvolemic  -Recommend fluid restriction 1 8 L/min  -says weight same -continue weight monitoring, recommend taking extra torsemide in future if any weight gain more than 3 lb in a day or 5 lb in a week and to follow low-sodium diet     Proteinuria, persistent  -UPC ratio improving to UPC ratio of 0 18 from 0 22 from 0 46  improving due to recent use of ACEI  -likely due to hypertensive nephrosclerosis  -continue lisinopril       Hypomagnesemia  -On mag oxide 200 mg weekly but says not taking  Had very low magnesium level back in 2015  -last Mag level wnl- taking multivitamin  -likely due to use of PPI     Hyperphosphatemia- level improved to 3 7    -recommend low phosphorus diet   -list provided previously      Secondary hyperparathyroidism of renal origin  -PTH improving to 141 8 from 225 3    Was started on calcitriol 0 25 mcg 3 times week and will monitor    -says he stopped vitamin D 1000 units daily supplement since he started calcitriol   -monitor PTH and will check vitamin D level       Anemia due to CKD stage 4 and due to iron deficiency  -hb 11 5 g/dl    -recommend oral iron tablets 325 mg ferrous sulfate daily  C/o constipation with iron    -will recheck iron stores   -stressed on hematology follow up      B/L nephrolithiasis/left renal cyst  -currently asymptomatic and patient not aware  -reviewed CT abdomen report from April 2022, finding of nonobstructing bilateral nephrolithiasis    Largest 8 mm inferior pole of left kidney    -Pt asymptomatic   - no monitoring needed for renal cyst as it was a simple parapelvic cyst  Not mentioned about  Renal cyst       Hyperlipidemia  -on Lipitor  - recommend goal LDL less than 100  -last lipid panel was at goal      DM-2 controlled, with chronic kidney disease stage 4  -now off metformin-continue to hold metformin as GFR less than 30  -last A1c was 7 7, says was eating ice-cream, stressed  on diet control  -continue management per PCP     Smoking/tobacco use, stressed on quitting smoking but patient not interested at this time     Hyperuricemia  -on allopurinol - 300 mg daily   -uric acid improved to 5 2   -low purine diet            Patient Instructions   -Renal Function is stable  -You have Chronic Kidney Disease Stage 4  -Avoid NSAIDs like Ibuprofen/Motrin, Naproxen/Aleve, Celebrex, meloxicam/Mobic, Diclofenac and other NSAIDs   -Okay to take Acetaminophen/Tylenol if you do not have any liver problems  -Avoid IV contrast used for CT scan and cardiac catheterization     -If plan for any study with IV contrast, please let me know so we could hydrate with fluids before and after IV contrast  -Dosage  of certain medications may need to be adjusted depending on Kidney function  Blood pressure is stable  -Recommend 2 g sodium diet  -Recommend daily exercise and weight loss  -Discussed home monitoring of BP and maintaining a log of blood pressure   -Recommend goal BP less than 130/80  Please inform me if SBP below 110 or above 140's persistently  Follow up in 4 months with labs        Diagnoses and all orders for this visit:    CKD (chronic kidney disease) stage 4, GFR 15-29 ml/min ([facility])  -     Basic metabolic panel; Future  -     Protein / creatinine ratio, urine; Future  -     PTH, intact; Future  -     Uric acid; Future  -     Urinalysis with microscopic; Future  -     Phosphorus; Future  -     Magnesium; Future  -     CBC; Future  -     Iron Panel (Includes Ferritin, Iron Sat%, Iron, and TIBC); Future    Benign hypertension with chronic kidney disease, stage IV ([facility])  -     Basic metabolic panel; Future    Persistent proteinuria, unspecified  -     Protein / creatinine ratio, urine; Future    Chronic diastolic CHF (congestive heart failure) ([facility])  -     Basic metabolic panel; Future    Hyperuricemia  -     Uric acid; Future    Secondary hyperparathyroidism of renal origin ([facility] [address] )  -     PTH, intact; Future  -     Vitamin D 25 hydroxy; Future  -     calcitriol (ROCALTROL) 0 25 mcg capsule; Take 1 capsule (0 25 mcg total) by mouth 3 (three) times a week    Iron deficiency anemia, unspecified iron deficiency anemia type  -     CBC; Future  -     Iron Panel (Includes Ferritin, Iron Sat%, Iron, and TIBC); Future    Controlled type 2 diabetes mellitus with stage 4 chronic kidney disease, without long-term current use of insulin ([facility])  -     Basic metabolic panel;  Future            SUBJECTIVE / HPI:  Fernando Hwang  is a 76 y o   male with medical issues of chronic kidney disease, HTN x 15 yrs,  DM-2 anemia, colitis  , CAD s/p CABG who presents for follow-up of chronic kidney disease stage 3      Review of records, patient has elevated creatinine dating back to 2018 November when the creatinine was 1 3   It was stable at 1 4 to 1 7 in 2020   Since June 2021 creatinine has been around 1 6-1 8  It had worsened to creatinine 2 49 on 12/14/21, likely prerenal in the setting of abdominal pain and diarrhea and renal function improved to creatinine 1 7-1 8 in January 2022      Patient with baseline creatinine 1 6-1 8 he was at [facility] and underwent cardiac PCI on 05/24/22  Creatinine since May has been around 2 3-2 4, improved to 2 0 on 05/25 likely due to hydration during PCI      He was again admitted from June 14 to June 17, underwent TAVR on June 14th 2022  Recently creatinine has been around 2 0-2 25 since October 2022 this is most likely his new baseline    Labs from 12/7/2022 showed creatinine 2 13 mg/dL with stable electrolytes  Hemoglobin 11 5 g/dl  Hemoglobin A1c 7 7, PTH level trending down to 141 8 from level up to 25 3    Had gout in October and so dose of allopurinol was increased to 300 mg     Denies any new complaints    REVIEW OF SYSTEMS:    Review of Systems   Constitutional: Negative for chills and fever  HENT: Negative for ear pain and sore throat  Eyes: Negative for pain and visual disturbance  Respiratory: Negative for cough and shortness of breath  Cardiovascular: Negative for chest pain and palpitations  Gastrointestinal: Negative for abdominal pain and vomiting  Genitourinary: Negative for dysuria and hematuria  Musculoskeletal: Negative for arthralgias and back pain  Skin: Negative for color change and rash  Neurological: Negative for seizures and syncope  All other systems reviewed and are negative        More than 10 point review of systems were obtained and discussed in length with the patient  Complete review of systems were negative / unremarkable except mentioned above  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 ([place] [address] ) 09/10/2021   • Basal cell carcinoma     right cheek   • CAD (coronary artery disease)    • Carotid stenosis, asymptomatic, bilateral    • Chronic kidney disease    • Colon polyp    • Diabetes ([place] [address] )     type 2, non-insulin dependent   • Diabetes mellitus (HCC)    • GERD (gastroesophageal reflux disease)    • History of nephrolithiasis    • Hyperlipidemia    • Hypertension    • PAD (peripheral artery disease) ([place] [address] )    • Severe aortic stenosis        PAST SURGICAL HISTORY:  Past Surgical History:   Procedure Laterality Date   • APPENDECTOMY     • CARDIAC CATHETERIZATION N/A 05/05/2022    Procedure: CARDIAC RHC/LHC; Surgeon: Negra Hanna DO;  [facility]; Service: Cardiology   • CARDIAC CATHETERIZATION N/A 05/05/2022    Procedure: Cardiac Coronary Angiogram;  Surgeon: Negra Hanna DO;  [facility]; Service: Cardiology   • CARDIAC CATHETERIZATION N/A 05/24/2022    Procedure: Cardiac pci;  Surgeon: Andrea Valdes MD;  [facility];   Service: Cardiology   • CARDIAC CATHETERIZATION N/A 06/14/2022    Procedure: CARDIAC TAVR;  Surgeon: Deneen León MD;  Location: [facility];  Service: Cardiology   • COLECTOMY     • COLONOSCOPY  2013   • CORONARY ARTERY BYPASS GRAFT  2013    X 2   • FEMORAL ARTERY - POPLITEAL ARTERY BYPASS GRAFT     • HEMORRHOID SURGERY     • IR AORTAGRAM WITH RUN-OFF  11/19/2018   • [place] SLCTV CATHJ 3RD+ ORD SLCTV ABDL PEL/LXTR [facility] Left 08/12/2016    Procedure: LEFT FEMORAL ARTERIOGRAM; BALLOON ANGIOPLASTY; SFA  AND FEMORAL AT VEIN GRAFT;  Surgeon: Shanthi Lopez MD;  Location: [facility];  Service: Vascular   • [place] TRANSCATHETER TRANSAPICAL REPLACEMT AORTIC VALVE N/A 06/14/2022 Procedure: REPLACEMENT AORTIC VALVE TRANSCATHETER (TAVR) TRANSAPICAL 29MM IRVIN KYLER S3 ULTRA VALVE(ACCESS ON LEFT) ELANA;  Surgeon: Raymon Evans DO;  Location: [facility];  Service: Cardiac Surgery   • SKIN CANCER EXCISION     • TONSILLECTOMY AND ADENOIDECTOMY         SOCIAL HISTORY:  Social History     Substance and Sexual Activity   Alcohol Use Yes    Comment: rare     Social History     Substance and Sexual Activity   Drug Use No     Social History     Tobacco Use   Smoking Status Every Day   • Packs/day: 0 25   • Years: 50 00   • Pack years: 12 50   • Types: Cigarettes   Smokeless Tobacco Never   Tobacco Comments    3-4 cigarettes daily       FAMILY HISTORY:  Family History   Problem Relation Age of Onset   • Heart attack Father    • Other Sister         bypass and vlave replacement   • Stroke Paternal Uncle    • Arrhythmia Neg Hx    • Asthma Neg Hx    • Clotting disorder Neg Hx    • Fainting Neg Hx    • Anuerysm Neg Hx    • Hypertension Neg Hx         unsure    • Hyperlipidemia Neg Hx    • Heart failure Neg Hx        MEDICATIONS:    Current Outpatient Medications:   •  acetaminophen (TYLENOL) 325 mg tablet, Take 3 tablets (975 mg total) by mouth every 8 (eight) hours, Disp: , Rfl: 0  •  allopurinol (ZYLOPRIM) 300 mg tablet, Take 1 tablet (300 mg total) by mouth daily, Disp: 90 tablet, Rfl: 3  •  amLODIPine (NORVASC) 10 mg tablet, TAKE 1 TABLET DAILY, Disp: 90 tablet, Rfl: 3  •  AMYLASE-LIPASE-PROTEASE PO, Take by mouth 2 (two) times a day before meals, Disp: , Rfl:   •  aspirin (ECOTRIN LOW STRENGTH) 81 mg EC tablet, Take 81 mg by mouth daily, Disp: , Rfl:   •  atorvastatin (LIPITOR) 80 mg tablet, TAKE 1 TABLET DAILY AT     BEDTIME, Disp: 90 tablet, Rfl: 3  •  [START ON 1/6/2023] calcitriol (ROCALTROL) 0 25 mcg capsule, Take 1 capsule (0 25 mcg total) by mouth 3 (three) times a week, Disp: 36 capsule, Rfl: 4  •  Cholecalciferol (VITAMIN D3 PO), Take 2,000 Units by mouth, Disp: , Rfl:   • clopidogrel (PLAVIX) 75 mg tablet, Take 1 tablet (75 mg total) by mouth in the morning , Disp: 90 tablet, Rfl: 3  •  Cyanocobalamin (VITAMIN B12 PO), Take by mouth once a week  , Disp: , Rfl:   •  docusate sodium (COLACE) 100 mg capsule, Take 1 capsule (100 mg total) by mouth 2 (two) times a day, Disp: , Rfl: 0  •  lisinopril (ZESTRIL) 10 mg tablet, Take 1 tablet (10 mg total) by mouth daily, Disp: 90 tablet, Rfl: 3  •  metoprolol succinate (TOPROL-XL) 25 mg 24 hr tablet, Take 0 5 tablets (12 5 mg total) by mouth daily, Disp: 45 tablet, Rfl: 1  •  Multiple Vitamin (MULTIVITAMIN ADULT PO), Take by mouth, Disp: , Rfl:   •  pantoprazole (PROTONIX) 40 mg tablet, TAKE 1 TABLET DAILY, Disp: 90 tablet, Rfl: 1  •  potassium chloride (K-DUR,KLOR-CON) 10 mEq tablet, Take 1 tablet (10 mEq total) by mouth daily, Disp: 90 tablet, Rfl: 3  •  torsemide (DEMADEX) 20 mg tablet, Take 1 tablet (20 mg total) by mouth 2 (two) times a day (Patient taking differently: Take 10 mg by mouth daily), Disp: 180 tablet, Rfl: 3  •  amoxicillin (AMOXIL) 500 mg capsule, Please take all 4 tablets (2000 mg total) 1 hour prior to procedure (Patient not taking: Reported on 1/5/2023), Disp: 4 capsule, Rfl: 0  •  Magnesium Oxide (MAG-200 PO), Take by mouth once a week  (Patient not taking: Reported on 1/5/2023), Disp: , Rfl:       PHYSICAL EXAM:  Vitals:    01/05/23 1514 01/05/23 1539   BP: 158/52 130/50   BP Location: Left arm    Patient Position: Sitting    Cuff Size: Standard    Pulse: 57    Weight: 79 8 kg (176 lb)    Height: 5' 10" (1 778 m)      Body mass index is 25 25 kg/m²  Physical Exam  Constitutional:       General: He is not in acute distress  Appearance: He is well-developed  He is not diaphoretic  HENT:      Head: Normocephalic and atraumatic  Mouth/Throat:      Mouth: Mucous membranes are moist    Eyes:      General: No scleral icterus       Conjunctiva/sclera: Conjunctivae normal       Pupils: Pupils are equal, round, and reactive to light  Neck:      Thyroid: No thyromegaly  Cardiovascular:      Rate and Rhythm: Normal rate and regular rhythm  Heart sounds: Normal heart sounds  No murmur heard  No friction rub  Pulmonary:      Effort: Pulmonary effort is normal  No respiratory distress  Breath sounds: Normal breath sounds  No wheezing or rales  Abdominal:      General: Bowel sounds are normal  There is no distension  Palpations: Abdomen is soft  Tenderness: There is no abdominal tenderness  Musculoskeletal:         General: No deformity  Cervical back: Neck supple  Right lower leg: No edema  Left lower leg: No edema  Lymphadenopathy:      Cervical: No cervical adenopathy  Skin:     Coloration: Skin is not pale  Nails: There is no clubbing  Neurological:      Mental Status: He is alert and oriented to person, place, and time  He is not disoriented  Psychiatric:         Mood and Affect: Mood normal  Mood is not anxious  Affect is not inappropriate  Behavior: Behavior normal          Thought Content:  Thought content normal          Lab Results:   Results for orders placed or performed in visit on 12/07/22   PTH, intact   Result Value Ref Range     8 (H) 18 4 - 80 1 pg/mL   Protein / creatinine ratio, urine   Result Value Ref Range    Creatinine, Ur 150 0 mg/dL    Protein Urine Random 27 mg/dL    Prot/Creat Ratio, Ur 0 18 (H) 0 00 - 0 10   Phosphorus   Result Value Ref Range    Phosphorus 3 7 2 3 - 4 1 mg/dL   Magnesium   Result Value Ref Range    Magnesium 2 4 1 6 - 2 6 mg/dL   Comprehensive metabolic panel   Result Value Ref Range    Sodium 141 135 - 147 mmol/L    Potassium 4 2 3 5 - 5 3 mmol/L    Chloride 108 96 - 108 mmol/L    CO2 27 21 - 32 mmol/L    ANION GAP 6 4 - 13 mmol/L    BUN 45 (H) 5 - 25 mg/dL    Creatinine 2 13 (H) 0 60 - 1 30 mg/dL    Glucose, Fasting 154 (H) 65 - 99 mg/dL    Calcium 9 5 8 3 - 10 1 mg/dL    AST 14 5 - 45 U/L    ALT 18 12 - 78 U/L Alkaline Phosphatase 96 46 - 116 U/L    Total Protein 7 2 6 4 - 8 4 g/dL    Albumin 3 6 3 5 - 5 0 g/dL    Total Bilirubin 0 58 0 20 - 1 00 mg/dL    eGFR 28 ml/min/1 73sq m   CBC   Result Value Ref Range    WBC 7 43 4 31 - 10 16 Thousand/uL    RBC 3 92 3 88 - 5 62 Million/uL    Hemoglobin 11 5 (L) 12 0 - 17 0 g/dL    Hematocrit 37 3 36 5 - 49 3 %    MCV 95 82 - 98 fL    MCH 29 3 26 8 - 34 3 pg    MCHC 30 8 (L) 31 4 - 37 4 g/dL    RDW 15 7 (H) 11 6 - 15 1 %    Platelets 743 102 - 546 Thousands/uL    MPV 11 0 8 9 - 12 7 fL

## 2023-01-06 ENCOUNTER — TELEPHONE (OUTPATIENT)
Dept: CARDIOLOGY CLINIC | Facility: CLINIC | Age: 78
End: 2023-01-06

## 2023-01-06 NOTE — TELEPHONE ENCOUNTER
Spoke to patient  Advised that he should continue his medication as prescribed and the abx that were requested were sent to the pharmacy on 1/4 so he should contact his pharmacy regarding the abx  Also confirmed that the letter was placed in the mail on 12/29 for him  Advised that we can remail if needed  He stated that he has been looking up the information and has no further questions and does not need the letter remailed at this time  Understanding verbalized and patient has no further questions at this time

## 2023-01-06 NOTE — TELEPHONE ENCOUNTER
Attempted to reach patient again in regards to pre-op questions he had  Was unable to leave a message

## 2023-01-11 ENCOUNTER — PROCEDURE VISIT (OUTPATIENT)
Dept: DERMATOLOGY | Facility: CLINIC | Age: 78
End: 2023-01-11

## 2023-01-11 VITALS
DIASTOLIC BLOOD PRESSURE: 64 MMHG | HEART RATE: 50 BPM | BODY MASS INDEX: 25.2 KG/M2 | SYSTOLIC BLOOD PRESSURE: 140 MMHG | HEIGHT: 70 IN | WEIGHT: 176 LBS | TEMPERATURE: 96.9 F | OXYGEN SATURATION: 95 %

## 2023-01-11 DIAGNOSIS — C44.229 SQUAMOUS CELL CARCINOMA OF LEFT EAR: ICD-10-CM

## 2023-01-11 NOTE — PATIENT INSTRUCTIONS
Mohs Microscopic Surgery After Care    WOUND CARE AFTER SURGERY:    Do NOT to remove the pressure bandage for 48 hours  Keep the area clean and dry while this bandage is on  After removing the bandage for the first time, gently clean the area with soap and water  If the bandage is difficult to remove, getting the bandage wet in the shower will sometimes help soften the adhesive and allow it to be removed more easily  You will now need to cleanse this area daily in the shower with gentle soap  There is no need to scrub the area  You will need to apply plain Vaseline ointment (this is over the counter and not a prescription) to the site for up to 2 weeks followed by a clean appropriately sized bandage to area  Non stick dressing and paper tape (or Hypafix) are recommended for sensitive skin but a bandaid is fine if it covers the area well  All your stitches will dissolve over the next two weeks  You will need to keep these moist with Vaseline and covered with a bandage over the next 2 weeks for them to dissolve appropriately  RESTRICTIONS:     For two DAYS:   - You will need to take it very easy as this time is highest risk for bleeding  Being a "couch potato" during these two days is generally recommended  - For surgeries on the face/neck/scalp: Avoid leaning down to pick things up off the floor as this brings blood up to your head  Instead, squat down to pick things up  For two WEEKS:   - No heavy lifting (anything greater than 10 pounds)   - You can start to do slow, gentle activities such as slow walking but nothing to increase your heart rate and blood pressure too much (such as cardiovascular exercise)  It is important to take it easy as there is still a risk for bleeding and a high risk popping of stitches open during this time  MANAGING YOUR PAIN AFTER SURGERY     You can expect to have some pain after surgery   This is normal  The pain is typically worse the first two days after surgery, and quickly begins to get better  The best strategy for controlling your pain after surgery is around the clock pain control  You can take over the counter Acetaminophen (Tylenol) for discomfort, if no contraindications  If you are taking this at the maximum dose, you can alternate this with Motrin (ibuprofen or Advil) as well  Alternating these medications with each other allows you to maximize your pain control  In addition to Tylenol and Motrin, you can use heating pads or ice packs on your incisions to help reduce your pain  How will I alternate your regular strength over-the-counter pain medication? You will take a dose of pain medication every three hours  Start by taking 650 mg of Tylenol (2 pills of 325 mg)   3 hours later take 600 mg of Motrin (3 pills of 200 mg)   3 hours after taking the Motrin take 650 mg of Tylenol   3 hours after that take 600 mg of Motrin  See example - if your first dose of Tylenol is at 12:00 PM     12:00 PM  Tylenol 650 mg (2 pills of 325 mg)    3:00 PM  Motrin 600 mg (3 pills of 200 mg)    6:00 PM  Tylenol 650 mg (2 pills of 325 mg)    9:00 PM  Motrin 600 mg (3 pills of 200 mg)    Continue alternating every 3 hours      Important:   Do not take more than 4000mg of Tylenol or 3200mg of Motrin in a 24-hour period  What if I still have pain? If you have pain that is not controlled with the over-the-counter pain medications (Tylenol and Motrin or Advil), don't hesitate to call our staff using the number provided  We will help make sure you are managing your pain in the best way possible, and if necessary, we can provide a prescription for additional pain medication  CALL OUR OFFICE IMMEDIATELY FOR ANY SIGNS OF INFECTION:    This includes fever, chills, increased redness, warmth, tenderness, severe discomfort/pain, or pus or foul smell coming from the wound   If you are experiencing any of the above, please call   [facility] directly at [phone] (SKIN)    IF BLEEDING IS NOTICED:    Place a clean cloth over the area and apply firm pressure for thirty minutes  Check the wound ONLY after 30 minutes of direct pressure; do not cheat and sneak a peak, as that does not count  If bleeding persists after 30 minutes of legitimate direct pressure, then try one more round of direct pressure to the area  Should the bleeding become heavier or not stop after the second attempt, call [facility] directly at [phone] (SKIN)  Your call will get routed to the dermatology surgeon on call even after hours

## 2023-01-11 NOTE — PROGRESS NOTES
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 ([facility] ) 09/10/2021   • Basal cell carcinoma     right cheek   • CAD (coronary artery disease)    • Carotid stenosis, asymptomatic, bilateral    • Chronic kidney disease    • Colon polyp    • Diabetes ([facility] )     type 2, non-insulin dependent   • Diabetes mellitus (HCC)    • GERD (gastroesophageal reflux disease)    • History of nephrolithiasis    • Hyperlipidemia    • Hypertension    • PAD (peripheral artery disease) (HCC)    • Severe aortic stenosis    • Squamous cell skin cancer 2022    left superior helix       Past Surgical History:   Procedure Laterality Date   • APPENDECTOMY     • CARDIAC CATHETERIZATION N/A 2022    Procedure: CARDIAC RHC/LHC; Surgeon: Padmini Sepulveda DO;  [facility]: [facility] CARD[facility]; Service: Cardiology   • CARDIAC CATHETERIZATION N/A 2022    Procedure: Cardiac Coronary Angiogram;  Surgeon: Padmini Sepulveda DO;  Location: [facility] CARD[facility]AC C[facility]; Service: Cardiology   • CARDIAC CATHETERIZATION N/A 2022    Procedure: Cardiac pci;  Surgeon: Luke Rothman MD;  Location: [facility] CARD[facility]AC CATH LAB;   Service: Cardiology   • CARDIAC CATHETERIZATION N/A 2022    Procedure: CARDIAC TAVR;  Surgeon: Jhonatan Davis MD;  Location: [facility] MAIN OR;  Service: Cardiology   • COLECTOMY     • COLONOSCOPY     • CORONARY ARTERY BYPASS GRAFT  2013    X 2   • FEMORAL ARTERY - POPLITEAL ARTERY BYPASS GRAFT     • HEMORRHOID SURGERY     • IR AORTAGRAM WITH RUN-OFF  2018   • MOHS SURGERY Left 2023    SCC left superior helix-Dr Tovar   • CA SLCTV CATHJ 3RD+ ORD SLCTV ABDL PEL/LXTR [facility] Left 08/12/2016    Procedure: LEFT FEMORAL ARTERIOGRAM; BALLOON ANGIOPLASTY; SFA  AND FEMORAL AT VEIN GRAFT;  Surgeon: Tracy Saeed MD;  Location: [facility] MAIN OR;  Service: Vascular   • [place] TRANSCATHETER TRANSAPICAL REPLACEMT AORTIC VALVE N/A 06/14/2022    Procedure: REPLACEMENT AORTIC VALVE TRANSCATHETER (TAVR) TRANSAPICAL 29MM IRVIN KYLER S3 ULTRA VALVE(ACCESS ON LEFT) ELANA;  Surgeon: Janell Pelletier DO;  Location: [facility] MAIN OR;  Service: Cardiac Surgery   • SKIN CANCER EXCISION     • TONSILLECTOMY AND ADENOIDECTOMY           Current Outpatient Medications:   •  acetaminophen (TYLENOL) 325 mg tablet, Take 3 tablets (975 mg total) by mouth every 8 (eight) hours, Disp: , Rfl: 0  •  allopurinol (ZYLOPRIM) 300 mg tablet, Take 1 tablet (300 mg total) by mouth daily, Disp: 90 tablet, Rfl: 3  •  amLODIPine (NORVASC) 10 mg tablet, TAKE 1 TABLET DAILY, Disp: 90 tablet, Rfl: 3  •  AMYLASE-LIPASE-PROTEASE PO, Take by mouth 2 (two) times a day before meals, Disp: , Rfl:   •  aspirin (ECOTRIN LOW STRENGTH) 81 mg EC tablet, Take 81 mg by mouth daily, Disp: , Rfl:   •  atorvastatin (LIPITOR) 80 mg tablet, TAKE 1 TABLET DAILY AT     BEDTIME, Disp: 90 tablet, Rfl: 3  •  calcitriol (ROCALTROL) 0 25 mcg capsule, Take 1 capsule (0 25 mcg total) by mouth 3 (three) times a week, Disp: 36 capsule, Rfl: 4  •  Cholecalciferol (VITAMIN D3 PO), Take 2,000 Units by mouth, Disp: , Rfl:   •  clopidogrel (PLAVIX) 75 mg tablet, Take 1 tablet (75 mg total) by mouth in the morning , Disp: 90 tablet, Rfl: 3  •  Cyanocobalamin (VITAMIN B12 PO), Take by mouth once a week  , Disp: , Rfl:   •  docusate sodium (COLACE) 100 mg capsule, Take 1 capsule (100 mg total) by mouth 2 (two) times a day, Disp: , Rfl: 0  •  lisinopril (ZESTRIL) 10 mg tablet, Take 1 tablet (10 mg total) by mouth daily, Disp: 90 tablet, Rfl: 3  •  metoprolol succinate (TOPROL-XL) 25 mg 24 hr tablet, Take 0 5 tablets (12 5 mg total) by mouth daily, Disp: 45 tablet, Rfl: 1  •  Multiple Vitamin (MULTIVITAMIN ADULT PO), Take by mouth, Disp: , Rfl:   •  pantoprazole (PROTONIX) 40 mg tablet, TAKE 1 TABLET DAILY, Disp: 90 tablet, Rfl: 1  •  potassium chloride (K-DUR,KLOR-CON) 10 mEq tablet, Take 1 tablet (10 mEq total) by mouth daily, Disp: 90 tablet, Rfl: 3  •  torsemide (DEMADEX) 20 mg tablet, Take 1 tablet (20 mg total) by mouth 2 (two) times a day (Patient taking differently: Take 10 mg by mouth daily), Disp: 180 tablet, Rfl: 3  •  Magnesium Oxide (MAG-200 PO), Take by mouth once a week  (Patient not taking: Reported on 1/5/2023), Disp: , Rfl:     No Known Allergies    Physical Exam:   Vitals:    01/11/23 0745   BP: 140/64   Pulse: (!) 50   Temp: (!) 96 9 °F (36 1 °C)   SpO2: 95%     General: Awake, Alert, Oriented x 3, Mood and affect appropriate  Respiratory: Respirations even and unlabored  Cardiovascular: Peripheral pulses intact; no edema  Musculoskeletal Exam: n/a    Skin: 1 cm x 0 7 cm pink scar on the left superior helix    Assessment: Biopsy confirmed squamous cell carcinoma of the left superior helix  Plan:  Mohs    MOHS Procedure Timeout    Flowsheet Row Most Recent Value   Timeout: 5936   Patient Identity Verified: Yes   Correct Site Verified: Yes   Correct Procedure Verified: Yes          MOHS Diagnosis/Indication/Location/ID    Flowsheet Row Most Recent Value   Pathology Type Squamous cell carcinoma   Anatomic Site left superior helix   Indications for MOHS tumor location   MOHS ID [id number]          MOHS Site/Accession/Pre-Post    Flowsheet Row Most Recent Value   Original Site Identified (as submitted by referring clinician) Photo, Referral   Biopsy Accession/Specimen # (as submitted by referring clincian) [id number]   Pre-MOHS Size Length (cm) 1   Pre-MOHS Size Width (cm) 0 7   Post-MOHS Size-Length (cm) 1   Post MOHS Size-Width (cm) 0 7   Repair Type Complex layered closure   Suture Type Fast absorbing gut, Monocryl plus   Fast Absorbing Suture Size 5 Monocryl Plus Suture Size 5   Final repair length (cm): 3 5   Anesthetic Used 1% Lidocaine with epinephrine  [2cc with bicarb]          MOHS Tumor Stage 1 Information    Flowsheet Row Most Recent Value   Tissue Sections (blocks) 2   Microscopic Exam Section 1: No tumor identified in section  Microscopic Exam Section 2: No tumor identified in section  Tumor Clear After Stage I? Yes                  Patient identified, procedure verified, site identified and verified  Time out completed  Surgical removal of the lesion discussed with the patient (risks and benefits, including possibility of scarring, infection, recurrence or potential for further treatment)  I have specifically identified the site with the patient  I have discussed the fact that the patient will have a scar after the procedure regardless of granulation or repair with sutures  I have discussed that the repair options can range from granulation in some cases to linear or curvilinear closures to larger flaps or grafts  There are sometimes flaps or grafts used that require multiples stages of surgery and will not be completed today, rather be completed over a series of appointments  I have discussed that occasionally due to location, size or depth of the lesion I may recommend consultation with and transfer of care for further removal or the reconstruction to another provider such as ophthalmology surgery, plastic surgery, ENT surgery, or surgical oncology  There are cases in which other testing such as imaging with MRI or CT scan or testing of lymph nodes is recommended because of the nature/depth/location of tumor seen during the removal  There is a risk of injury to nerves causing temporary or permanent numbness or the inability to move muscles full such as the inability to lift eyebrows  Questions answered and verbal and written consent was obtained  The tumor qualifies for Mohs based on AUC criteria   Dr Evert Simpson served as the surgeon and pathologist during the procedure  With the patient in the supine position and under adequate local anesthesia with 1% lidocaine with epinephrine 1:100,000, the defect was scrubbed with iodine  Sterile drapes were placed from the sterile tray  Because of the location of the surgical defect, a complex closure was judged to give the best possible cosmetic and functional result  The edges of the defect were carefully debrided removing any dead or coagulated tissue  This was a complex closure because of the following: There was involvement of the free margin of the helical rim    Hemostasis was obtained by pinpoint electrocoagulation  Careful planning of removal of redundant tissue at either end of the defect was drawn out so that the suture lines would fall in the optimal orientation with regard to the relaxed skin tension lines  These were then removed with a #15 blade scalpel  The wound was then approximated by a deep layer of buried vertical mattress sutures and the cutaneous margins were approximated and closed by superficial sutures in a running horizontal mattress as noted above  Estimated blood loss was less than 5 mL  The patient tolerated the procedure well  The wound was dressed with petrolatum, a non-stick pad, and a compression dressing  Gem Massey MD served as the surgeon and pathologist during the procedure  Postoperative care: Wound care discussed at length  I urged the patient to call us if any problems or question should arise  Complications: none  Post-op medications: none  Patient condition after procedure: stable  Discharge plans: Plan for suture removal 10-14 days at next scheduled appointment         Scribe Attestation    I,:  Keri Lacey am acting as a scribe while in the presence of the attending physician :       I,:  Gem Massey MD personally performed the services described in this documentation    as scribed in my presence :

## 2023-01-16 ENCOUNTER — PATIENT OUTREACH (OUTPATIENT)
Dept: FAMILY MEDICINE CLINIC | Facility: CLINIC | Age: 78
End: 2023-01-16

## 2023-01-16 ENCOUNTER — EPISODE CHANGES (OUTPATIENT)
Dept: CASE MANAGEMENT | Facility: OTHER | Age: 78
End: 2023-01-16

## 2023-01-16 NOTE — PROGRESS NOTES
Received referral via [employer] message  Chart reviewed  Pt does not meet NCQA Complex criteria as no BH diagnosis  Mr. Granda 49 Year-Old Gentleman presenting with abdominal pain, fevers, nausea/emesis, found to have likely colon malignancy with metastasis and questioned perforation.     - Agree with NPO, IV antibiotics: Zosyn.   - Patient will likely benefit from colonic resection prior to administration of chemo for metastases, however needs to complete full malignancy workup:   - Please obtain tumor markers in AM: CEA, CA-19-9, .   - Please obtain dedicated CT Chest for staging (ordered).   - Appreciate GI evaluation, recommend performing colonoscopy this admission for tissue biopsy and site marking, Colorectal Surgery can be on standby in setting perforation worsens during/after procedure.  - Discussed with Attending Surgeon.     A Team Surgery #[id number]

## 2023-01-16 NOTE — PROGRESS NOTES
Chart review completed for the following sections:  • Recent Vital Signs  • Allergies/Contradictions  • Medication Review   • History   • SDOH   • Problem List  • Immunizations  • Past hospitalizations and major procedures, including surgery  • Significant past illnesses and treatment history including: History and Physical, Other provider notes, PT, OT, ST, Medications/Infusions/Transfusions, Surgery, Radiation/Chemo, Wound care and Diet/Fluid restrictions  • Relevant past medications related to the patient's condition

## 2023-01-17 ENCOUNTER — TELEPHONE (OUTPATIENT)
Dept: GASTROENTEROLOGY | Facility: CLINIC | Age: 78
End: 2023-01-17

## 2023-01-17 NOTE — TELEPHONE ENCOUNTER
Scheduled date of EGD(as of today): 3/2/23  Physician performing EGD: Dr Kimo Dejesus  Location of EGD: [facility]  Instructions reviewed with patient by: ls  Clearances: 5 day Plavix hold clearance already received and pt is aware

## 2023-01-26 NOTE — PROGRESS NOTES
Name: Andie Ayala  : 1945      MRN: [id number]  Encounter Provider: Estrella Thomson MD  Encounter Date: 2023   Encounter department: [address] [place] [address] Place     1  Gastroesophageal reflux disease without esophagitis  Assessment & Plan:  Patient to continue with present therapy and decrease caffeine, avoid ETOH and smoking to decrease acid production  Pt should also cease eating prior to bedtime and avoid excessive fluid intake prior to sleep  May use antacids as needed for breakthrough GERD  All pateint questions answered today about this condition  2  Controlled type 2 diabetes mellitus with stage 4 chronic kidney disease, without long-term current use of insulin ([facility] )  Assessment & Plan:  Patient is stable with current meds and discussed a low carb diet  Pt  recommended to see eye doctor and foot doctor for routine screening as per protocol  Recheck A1C  and Cr in 3 months  Patient questions answered today about this condtion  Lab Results   Component Value Date    HGBA1C 7 7 (H) 2022       Orders:  -     Hemoglobin A1C; Future; Expected date: 2023  -     Comprehensive metabolic panel; Future; Expected date: 2023  -     Microalbumin / creatinine urine ratio; Future; Expected date: 2023    3  Primary hypertension  Assessment & Plan:  Patient is stable with current anti-hypertensive medicine and continue to follow a low sodium diet and take current medication  All questions about this condition were answered today  4  Coronary artery disease involving native coronary artery of native heart without angina pectoris  Assessment & Plan:  Patient to continue  with current cardiac meds to decrease risk of re stenosis  Patient to follow up with cardiology for scheduled appointments to decrease risk for further cardiac problems with appropriate diagnostic testing to reassess cardiac status   Patient had alll questions about this problem answered today  5  Atherosclerosis of native arteries of extremities with intermittent claudication, bilateral legs (HCC)  Assessment & Plan:  Patient is stable  and will continue present plan of care and reassess at next routine visit  All questions about this problem from patient were answered today  6  S/P TAVR (transcatheter aortic valve replacement)  Assessment & Plan:  Patient is stable  and will continue present plan of care and reassess at next routine visit  All questions about this problem from patient were answered today  7  Mixed hyperlipidemia  Assessment & Plan:  Patient  is stable with current medication and we discussed a low fat low cholesterol diet  Weight loss also discussed for this will help lower cholesterol also  Recheck lipids in 6 months  BMI Counseling: There is no height or weight on file to calculate BMI  The BMI is above normal  Nutrition recommendations include decreasing portion sizes, encouraging healthy choices of fruits and vegetables, decreasing fast food intake, consuming healthier snacks, limiting drinks that contain sugar, moderation in carbohydrate intake, increasing intake of lean protein, reducing intake of saturated and trans fat and reducing intake of cholesterol  Exercise recommendations include exercising 3-5 times per week  No pharmacotherapy was ordered  Patient referred to PCP  Rationale for BMI follow-up plan is due to patient being overweight or obese  Subjective     HPI  Review of Systems   Constitutional: Negative for activity change, appetite change, chills, fatigue, fever and unexpected weight change  HENT: Negative for congestion, ear pain, hearing loss, mouth sores, postnasal drip, sinus pressure, sinus pain, sneezing and sore throat  Respiratory: Negative for apnea, cough, shortness of breath and wheezing  Cardiovascular: Negative for chest pain, palpitations and leg swelling     Gastrointestinal: Negative for abdominal pain, constipation, diarrhea, nausea and vomiting  Endocrine: Negative for cold intolerance and heat intolerance  Genitourinary: Negative for dysuria, frequency and hematuria  Musculoskeletal: Negative for arthralgias, back pain, gait problem, joint swelling and neck pain  Skin: Negative for rash  Neurological: Negative for dizziness, weakness and numbness  Hematological: Does not bruise/bleed easily  Psychiatric/Behavioral: Negative for agitation, behavioral problems, confusion, hallucinations and sleep disturbance  The patient is not nervous/anxious  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 ([facility] ) 09/10/2021   • Basal cell carcinoma     right cheek   • CAD (coronary artery disease)    • Carotid stenosis, asymptomatic, bilateral    • Chronic kidney disease    • Colon polyp    • Diabetes ([place] [address] )     type 2, non-insulin dependent   • Diabetes mellitus (HCC)    • GERD (gastroesophageal reflux disease)    • History of nephrolithiasis    • Hyperlipidemia    • Hypertension    • PAD (peripheral artery disease) (HCC)    • Severe aortic stenosis    • Squamous cell skin cancer 11/22/2022    left superior helix     Past Surgical History:   Procedure Laterality Date   • APPENDECTOMY     • CARDIAC CATHETERIZATION N/A 05/05/2022    Procedure: CARDIAC RHC/LHC; Surgeon: Yuki Ardon DO;  [facility]: [facility] CARD[facility]; Service: Cardiology   • CARDIAC CATHETERIZATION N/A 05/05/2022    Procedure: Cardiac Coronary Angiogram;  Surgeon: Yuki Ardon DO;  Location: [facility] CARDIAC CATH [facility]; Service: Cardiology   • CARDIAC CATHETERIZATION N/A 05/24/2022    Procedure: Cardiac pci;  Surgeon: Oswaldo Levy MD;  Location: [facility] CARDIAC CATH LAB;   Service: Cardiology   • CARDIAC CATHETERIZATION N/A 06/14/2022    Procedure: CARDIAC TAVR;  Surgeon: Tommy House MD;  Location: [facility] MAIN OR;  Service: Cardiology   • COLECTOMY     • COLONOSCOPY     • CORONARY ARTERY BYPASS GRAFT  2013    X 2   • FEMORAL ARTERY - POPLITEAL ARTERY BYPASS GRAFT     • HEMORRHOID SURGERY     • IR AORTAGRAM WITH RUN-OFF  2018   • MOHS SURGERY Left 2023    SCC left superior helix-Dr Tovar   • [place] SLCTV CATHJ 3RD+ ORD SLCTV ABDL PEL/LXTR [facility] Left 2016    Procedure: LEFT FEMORAL ARTERIOGRAM; BALLOON ANGIOPLASTY; SFA  AND FEMORAL AT VEIN GRAFT;  Surgeon: Damir Farrell MD;  Location: [facility] MAIN OR;  Service: Vascular   • [place] TRANSCATHETER TRANSAPICAL REPLACEMT AORTIC VALVE N/A 2022    Procedure: REPLACEMENT AORTIC VALVE TRANSCATHETER (TAVR) TRANSAPICAL 29MM IRVIN KYLER S3 ULTRA VALVE(ACCESS ON LEFT) ELANA;  Surgeon: Nazanin Marshall DO;  Location: [facility] MAIN OR;  Service: Cardiac Surgery   • SKIN CANCER EXCISION     • TONSILLECTOMY AND ADENOIDECTOMY       Family History   Problem Relation Age of Onset   • Heart attack Father    • Other Sister         bypass and vlave replacement   • Stroke Paternal Uncle    • Arrhythmia Neg Hx    • Asthma Neg Hx    • Clotting disorder Neg Hx    • Fainting Neg Hx    • Anuerysm Neg Hx    • Hypertension Neg Hx         unsure    • Hyperlipidemia Neg Hx    • Heart failure Neg Hx      Social History     Socioeconomic History   • Marital status:      Spouse name: None   • Number of children: None   • Years of education: None   • Highest education level: None   Occupational History   • None   Tobacco Use   • Smoking status: Every Day     Packs/day: 0 25     Years: 50 00     Pack years: 12 50     Types: Cigarettes   • Smokeless tobacco: Never   • Tobacco comments:     3-4 cigarettes daily   Vaping Use   • Vaping Use: Never used   Substance and Sexual Activity   • Alcohol use: Yes     Comment: rare   • Drug use: No   • Sexual activity: None   Other Topics Concern   • None   Social History Narrative    · Most recent tobacco use screenin2018      · Do you currently or have you served in the [employer]: Yes      · If Yes, What branch of service:   [employer]      · Occupation:   Dentists      · Exercise level:   Occasional        · Caffeine intake:   Heavy      · Marital status:         · Diet:   Regular  low fat     · Seat belts used routinely:   Yes      · Smoke alarm in home: Yes      · Advance directive: Yes      · General stress level:   Low      Social Determinants of Health     Financial Resource Strain: Not on file   Food Insecurity: No Food Insecurity   • Worried About Running Out of Food in the Last Year: Never true   • Ran Out of Food in the Last Year: Never true   Transportation Needs: No Transportation Needs   • Lack of Transportation (Medical): No   • Lack of Transportation (Non-Medical): No   Physical Activity: Not on file   Stress: Not on file   Social Connections: Not on file   Intimate Partner Violence: Not on file   Housing Stability: Low Risk    • Unable to Pay for Housing in the Last Year: No   • Number of Places Lived in the Last Year: 1   • Unstable Housing in the Last Year: No     Current Outpatient Medications on File Prior to Visit   Medication Sig   • acetaminophen (TYLENOL) 325 mg tablet Take 3 tablets (975 mg total) by mouth every 8 (eight) hours   • allopurinol (ZYLOPRIM) 300 mg tablet Take 1 tablet (300 mg total) by mouth daily   • amLODIPine (NORVASC) 10 mg tablet TAKE 1 TABLET DAILY   • AMYLASE-LIPASE-PROTEASE PO Take by mouth 2 (two) times a day before meals   • aspirin (ECOTRIN LOW STRENGTH) 81 mg EC tablet Take 81 mg by mouth daily   • atorvastatin (LIPITOR) 80 mg tablet TAKE 1 TABLET DAILY AT     BEDTIME   • calcitriol (ROCALTROL) 0 25 mcg capsule Take 1 capsule (0 25 mcg total) by mouth 3 (three) times a week   • Cholecalciferol (VITAMIN D3 PO) Take 2,000 Units by mouth   • clopidogrel (PLAVIX) 75 mg tablet Take 1 tablet (75 mg total) by mouth in the morning     • Cyanocobalamin (VITAMIN B12 PO) Take by mouth once a week     • docusate sodium (COLACE) 100 mg capsule Take 1 capsule (100 mg total) by mouth 2 (two) times a day   • lisinopril (ZESTRIL) 10 mg tablet Take 1 tablet (10 mg total) by mouth daily   • metoprolol succinate (TOPROL-XL) 25 mg 24 hr tablet Take 0 5 tablets (12 5 mg total) by mouth daily   • Multiple Vitamin (MULTIVITAMIN ADULT PO) Take by mouth   • pantoprazole (PROTONIX) 40 mg tablet TAKE 1 TABLET DAILY   • potassium chloride (K-DUR,KLOR-CON) 10 mEq tablet Take 1 tablet (10 mEq total) by mouth daily   • Magnesium Oxide (MAG-200 PO) Take by mouth once a week  (Patient not taking: Reported on 1/5/2023)   • torsemide (DEMADEX) 20 mg tablet Take 1 tablet (20 mg total) by mouth 2 (two) times a day (Patient taking differently: Take 10 mg by mouth daily)     No Known Allergies  Immunization History   Administered Date(s) Administered   • COVID-19 PFIZER VACCINE 0 3 ML IM 03/03/2021, 03/24/2021, 09/30/2021   • COVID-19 Pfizer Vac BIVALENT Stuart-sucrose 12 Yr+ IM (BOOSTER ONLY) 09/29/2022   • H1N1 Inj 11/15/2020   • H1N1, All Formulations 11/15/2020   • INFLUENZA 10/04/2011, 10/19/2012, 10/16/2014, 10/13/2016, 10/06/2018, 11/11/2021, 10/19/2022   • Influenza Split High Dose Preservative Free IM 10/06/2018, 11/01/2019   • Pneumococcal Conjugate 13-Valent 12/04/2015   • Pneumococcal Polysaccharide PPV23 06/11/2019   • Tdap 10/15/2021       Objective     /58 (BP Location: Left arm, Patient Position: Sitting, Cuff Size: Standard)   Pulse 73   Temp 97 7 °F (36 5 °C)   Resp 18   Ht 5' 10" (1 778 m)   Wt 80 3 kg (177 lb)   SpO2 97%   BMI 25 40 kg/m²     Physical Exam  Franklin Graff MD

## 2023-01-26 NOTE — ASSESSMENT & PLAN NOTE
Patient is stable with current meds and discussed a low carb diet  Pt  recommended to see eye doctor and foot doctor for routine screening as per protocol  Recheck A1C  and Cr in 3 months  Patient questions answered today about this condtion    Lab Results   Component Value Date    HGBA1C 7 7 (H) 11/08/2022

## 2023-01-27 ENCOUNTER — OFFICE VISIT (OUTPATIENT)
Dept: FAMILY MEDICINE CLINIC | Facility: CLINIC | Age: 78
End: 2023-01-27

## 2023-01-27 VITALS
RESPIRATION RATE: 18 BRPM | TEMPERATURE: 97.7 F | BODY MASS INDEX: 25.34 KG/M2 | HEART RATE: 73 BPM | WEIGHT: 177 LBS | DIASTOLIC BLOOD PRESSURE: 58 MMHG | HEIGHT: 70 IN | OXYGEN SATURATION: 97 % | SYSTOLIC BLOOD PRESSURE: 126 MMHG

## 2023-01-27 DIAGNOSIS — K21.9 GASTROESOPHAGEAL REFLUX DISEASE WITHOUT ESOPHAGITIS: Primary | ICD-10-CM

## 2023-01-27 DIAGNOSIS — E78.2 MIXED HYPERLIPIDEMIA: ICD-10-CM

## 2023-01-27 DIAGNOSIS — N18.4 CONTROLLED TYPE 2 DIABETES MELLITUS WITH STAGE 4 CHRONIC KIDNEY DISEASE, WITHOUT LONG-TERM CURRENT USE OF INSULIN (HCC): ICD-10-CM

## 2023-01-27 DIAGNOSIS — E11.22 CONTROLLED TYPE 2 DIABETES MELLITUS WITH STAGE 4 CHRONIC KIDNEY DISEASE, WITHOUT LONG-TERM CURRENT USE OF INSULIN (HCC): ICD-10-CM

## 2023-01-27 DIAGNOSIS — I25.10 CORONARY ARTERY DISEASE INVOLVING NATIVE CORONARY ARTERY OF NATIVE HEART WITHOUT ANGINA PECTORIS: ICD-10-CM

## 2023-01-27 DIAGNOSIS — I10 PRIMARY HYPERTENSION: ICD-10-CM

## 2023-01-27 DIAGNOSIS — I70.213 ATHEROSCLEROSIS OF NATIVE ARTERIES OF EXTREMITIES WITH INTERMITTENT CLAUDICATION, BILATERAL LEGS (HCC): ICD-10-CM

## 2023-01-27 DIAGNOSIS — Z95.2 S/P TAVR (TRANSCATHETER AORTIC VALVE REPLACEMENT): ICD-10-CM

## 2023-01-28 ENCOUNTER — NURSE TRIAGE (OUTPATIENT)
Dept: OTHER | Facility: OTHER | Age: 78
End: 2023-01-28

## 2023-01-28 DIAGNOSIS — I25.10 CORONARY ARTERY DISEASE INVOLVING NATIVE CORONARY ARTERY OF NATIVE HEART WITHOUT ANGINA PECTORIS: Primary | ICD-10-CM

## 2023-01-28 RX ORDER — CLOPIDOGREL BISULFATE 75 MG/1
75 TABLET ORAL DAILY
Qty: 7 TABLET | Refills: 0 | Status: SHIPPED | OUTPATIENT
Start: 2023-01-28 | End: 2023-02-04

## 2023-01-28 NOTE — TELEPHONE ENCOUNTER
Regarding: urgent med refill (plavix)  ----- Message from Saray Beck sent at 1/28/2023  3:28 PM EST -----  "Im all out of my Plavix"

## 2023-01-28 NOTE — TELEPHONE ENCOUNTER
Reason for Disposition  • [1] Caller requesting a prescription renewal (no refills left), no triage required, AND [2] triager able to renew prescription per department policy    Answer Assessment - Initial Assessment Questions  1  NAME of MEDICATION: "What medicine are you calling about?"      Plavix 75 mg PO daily    2  QUESTION: "What is your question?" (e g , medication refill, side effect)     "I am all out of and need a refill"    3  PRESCRIBING HCP: "Who prescribed it?" Reason: if prescribed by specialist, call should be referred to that group        Dr Abdi Dalton    Protocols used: UJJPUANAAJ VROQREAJ RBBP-POIRD-TF

## 2023-02-01 ENCOUNTER — TELEPHONE (OUTPATIENT)
Dept: GASTROENTEROLOGY | Facility: CLINIC | Age: 78
End: 2023-02-01

## 2023-02-01 NOTE — TELEPHONE ENCOUNTER
Patients GI provider:  Dr Maurice Stephens    Number to return call: [phone]    Reason for call: Pt calling in reference to getting a new RX for his Creon  He would like this paper RX mailed to his home as he mails to [place] ([place]      Scheduled procedure/appointment date if applicable: Apt/procedure 3/2/23

## 2023-02-02 NOTE — TELEPHONE ENCOUNTER
SPOKE WITH PT, CREON 25,000 IU, TAKES 2 PILLS WITH EACH MEAL, #300 WITH 3 REFILLS  PLEASE PRINT RX, THANK YOU

## 2023-02-02 NOTE — TELEPHONE ENCOUNTER
SPOKE WITH PT, TAKES CREON 6 PILLS DAILY, (2 PILLS WITH EACH MEAL) REQUESTING PRINTED RX FOR 90 DAY SUPPLY WITH REFILLS TO MAIL TO HIS PHARMACY, THANK YOU

## 2023-02-03 DIAGNOSIS — K86.81 EXOCRINE PANCREATIC INSUFFICIENCY: Primary | ICD-10-CM

## 2023-02-06 NOTE — TELEPHONE ENCOUNTER
Patient called and stated he requested his RX be faxed to [phone] and to have a hard copy mailed to him      Please call him to confirm  [phone]

## 2023-02-06 NOTE — TELEPHONE ENCOUNTER
Pt calling again in regards to his medication  Please reach out to pt regarding this matter  Pt can be reached at [phone]

## 2023-02-08 ENCOUNTER — TELEPHONE (OUTPATIENT)
Dept: VASCULAR SURGERY | Facility: CLINIC | Age: 78
End: 2023-02-08

## 2023-02-08 NOTE — TELEPHONE ENCOUNTER
----- Message from Kina Wilder MD sent at 2/8/2023 11:31 AM EST -----  Please setup for OV with me to discuss foot wound

## 2023-02-10 ENCOUNTER — APPOINTMENT (OUTPATIENT)
Dept: LAB | Facility: MEDICAL CENTER | Age: 78
End: 2023-02-10

## 2023-02-10 DIAGNOSIS — N18.4 CONTROLLED TYPE 2 DIABETES MELLITUS WITH STAGE 4 CHRONIC KIDNEY DISEASE, WITHOUT LONG-TERM CURRENT USE OF INSULIN (HCC): ICD-10-CM

## 2023-02-10 DIAGNOSIS — E11.22 CONTROLLED TYPE 2 DIABETES MELLITUS WITH STAGE 4 CHRONIC KIDNEY DISEASE, WITHOUT LONG-TERM CURRENT USE OF INSULIN (HCC): ICD-10-CM

## 2023-02-10 LAB
ALBUMIN SERPL BCP-MCNC: 3.6 G/DL (ref 3.5–5)
ALP SERPL-CCNC: 87 U/L (ref 46–116)
ALT SERPL W P-5'-P-CCNC: 14 U/L (ref 12–78)
ANION GAP SERPL CALCULATED.3IONS-SCNC: 5 MMOL/L (ref 4–13)
AST SERPL W P-5'-P-CCNC: 11 U/L (ref 5–45)
BILIRUB SERPL-MCNC: 0.38 MG/DL (ref 0.2–1)
BUN SERPL-MCNC: 60 MG/DL (ref 5–25)
CALCIUM SERPL-MCNC: 9.4 MG/DL (ref 8.3–10.1)
CHLORIDE SERPL-SCNC: 108 MMOL/L (ref 96–108)
CO2 SERPL-SCNC: 25 MMOL/L (ref 21–32)
CREAT SERPL-MCNC: 2.71 MG/DL (ref 0.6–1.3)
CREAT UR-MCNC: 121 MG/DL
GFR SERPL CREATININE-BSD FRML MDRD: 21 ML/MIN/1.73SQ M
GLUCOSE P FAST SERPL-MCNC: 153 MG/DL (ref 65–99)
MICROALBUMIN UR-MCNC: 94.7 MG/L (ref 0–20)
MICROALBUMIN/CREAT 24H UR: 78 MG/G CREATININE (ref 0–30)
POTASSIUM SERPL-SCNC: 4.2 MMOL/L (ref 3.5–5.3)
PROT SERPL-MCNC: 7.1 G/DL (ref 6.4–8.4)
SODIUM SERPL-SCNC: 138 MMOL/L (ref 135–147)

## 2023-02-11 LAB
EST. AVERAGE GLUCOSE BLD GHB EST-MCNC: 171 MG/DL
HBA1C MFR BLD: 7.6 %

## 2023-02-22 DIAGNOSIS — E78.5 HYPERLIPIDEMIA, UNSPECIFIED HYPERLIPIDEMIA TYPE: ICD-10-CM

## 2023-02-22 RX ORDER — ATORVASTATIN CALCIUM 80 MG/1
TABLET, FILM COATED ORAL
Qty: 90 TABLET | Refills: 3 | Status: SHIPPED | OUTPATIENT
Start: 2023-02-22

## 2023-02-23 ENCOUNTER — TELEPHONE (OUTPATIENT)
Dept: GASTROENTEROLOGY | Facility: CLINIC | Age: 78
End: 2023-02-23

## 2023-02-23 NOTE — TELEPHONE ENCOUNTER
lmom confirming pt's egd scheduled on 3/2/23 at [facility] with Dr Gregg Spencer  Informed [facility] would be calling the day prior with the arrival time  Informed pt to hold his Plavix 5 days prior to the procedure  Informed would need to be npo after midnight and would need a  the day of the procedure due to being under sedation  I asked pt to please call back if has not received instructions or if has any questions

## 2023-02-24 ENCOUNTER — TELEPHONE (OUTPATIENT)
Dept: GASTROENTEROLOGY | Facility: CLINIC | Age: 78
End: 2023-02-24

## 2023-02-24 ENCOUNTER — TELEPHONE (OUTPATIENT)
Dept: VASCULAR SURGERY | Facility: CLINIC | Age: 78
End: 2023-02-24

## 2023-02-24 ENCOUNTER — OFFICE VISIT (OUTPATIENT)
Dept: VASCULAR SURGERY | Facility: CLINIC | Age: 78
End: 2023-02-24

## 2023-02-24 VITALS
RESPIRATION RATE: 18 BRPM | HEIGHT: 70 IN | SYSTOLIC BLOOD PRESSURE: 152 MMHG | WEIGHT: 173 LBS | BODY MASS INDEX: 24.77 KG/M2 | DIASTOLIC BLOOD PRESSURE: 80 MMHG | HEART RATE: 62 BPM

## 2023-02-24 DIAGNOSIS — N18.4 BENIGN HYPERTENSION WITH CHRONIC KIDNEY DISEASE, STAGE IV (HCC): ICD-10-CM

## 2023-02-24 DIAGNOSIS — I70.234 ATHEROSCLEROSIS OF NATIVE ARTERY OF RIGHT LOWER EXTREMITY WITH ULCERATION OF MIDFOOT (HCC): Primary | ICD-10-CM

## 2023-02-24 DIAGNOSIS — I12.9 BENIGN HYPERTENSION WITH CHRONIC KIDNEY DISEASE, STAGE IV (HCC): ICD-10-CM

## 2023-02-24 PROBLEM — M79.672 LEFT FOOT PAIN: Status: RESOLVED | Noted: 2022-11-30 | Resolved: 2023-02-24

## 2023-02-24 NOTE — H&P (VIEW-ONLY)
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 (HCC)  Multiple issues CKD4, CHF EF 22,   78-year-old male with HTN, HLD, DM, CAD, CABG, AS, bilateral asymptomatic carotid stenosis, PAD status post left endarterectomy 2015, left fem to anterior tibial bypass by Dr Jose M Martin '15, s/p  Angioplasty to bypass graft stenoses '16, h/o Right SFA, popliteal and AT angioplasty in 2018  Prior a gram reviewed, extensive calcified plaque in right SFA, popliteal and tibials  Now has new right foot ischemic wounds in lateral foot and heel  He has restenosis of distal SFA popliteal   Will need to be treated to prevent limb loss  Will get clearance for hydration protocol per nephrologist     Risks of kidney failure, groin hematoma, limb loss were discussed  We will minimize contrast and use iv hydration and CO2  He is supposed to have Egd for Batista's surveillance next week and plavix is held  I will ask to cancel the EGD and restart plavix             Operative Scheduling Information:    Hospital:  [facility]    Physician:  Marlo Grier    Surgery: Right leg angiogram with intervention with angioplasty, stent, atherectomy  CO2    Urgency:  Urgent: 2 weeks    Level:  Level 2: Outpatients to be scheduled for surgery with time dependent medical necessity within 2 weeks    Case Length:  2 hr    Post-op Bed:  Outpatient    OR Table:  IR    Equipment Needs:  Rep: shockwave    Medication Instructions:  Plavix:  Continue (do not hold)  Aspirin: Continue (do not hold)    Hydration:  Per protocol    Contrast Allergy:  no      Benign hypertension with chronic kidney disease, stage IV ([facility] [address] )  Lab Results   Component Value Date    EGFR 21 02/10/2023    EGFR 28 12/07/2022    EGFR 29 11/08/2022    CREATININE 2 71 (H) 02/10/2023    CREATININE 2 13 (H) 12/07/2022    CREATININE 2 08 (H) 11/08/2022     Will need iv hydration pre and post hydration    Due to CHF will need to discuss with   Brenda Lennox and Keny Ro  Diagnoses and all orders for this visit:    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 ([address] )  -     IR aortagram with run-off; Future    Benign hypertension with chronic kidney disease, stage IV ([address] )  -     IR aortagram with run-off; Future    Other orders  -     Nursing communication Apply gown prior to procedure; Standing  -     Have Patient Void On Call to Procedure Room; Standing  -     Insert and Maintain IV; Standing        Subjective:      Patient ID: Marco Severino  is a 76 y o  male  Patient is here to have an evaluation of a non-healing Rt foot wound  Pt states that the wound started in November HPI  Right foot lateral foot wound is very painful  Heel is also painful  He is seeing Dr Mery Lucero  There are small wounds that are now appearing  He is getting electric shock pan  He takes aspirin and plavix  Smokes 1/4 PPD  The following portions of the patient's history were reviewed and updated as appropriate: allergies, current medications, past family history, past medical history, past social history, past surgical history and problem list     Review of Systems   Musculoskeletal: Positive for gait problem  Skin: Positive for color change and wound (Rt foot)  I have reviewed the ROS as entered and made changes as necessary  Objective:      /80 (BP Location: Left arm, Patient Position: Sitting)   Pulse 62   Resp 18   Ht 5' 10" (1 778 m)   Wt 78 5 kg (173 lb)   BMI 24 82 kg/m²          Physical Exam  Vitals and nursing note reviewed  Constitutional:       Appearance: Normal appearance  HENT:      Head: Normocephalic and atraumatic  Cardiovascular:      Rate and Rhythm: Normal rate and regular rhythm  Heart sounds: Murmur heard  Comments: trphasic Left Dp signal  Monophasic right DP signal    2+ left Fem AT bypass pulse  Pulmonary:      Breath sounds: Wheezing present  Abdominal:      Palpations: Abdomen is soft  Musculoskeletal:      Right lower leg: No edema  Left lower leg: No edema  Skin:     General: Skin is warm and dry  Capillary Refill: Capillary refill takes less than 2 seconds  Comments: Small ischemic ulcers in right lateral foot and heel  Dependant rubor of right foot  Neurological:      General: No focal deficit present  Mental Status: He is alert and oriented to person, place, and time     Psychiatric:         Mood and Affect: Mood normal          Behavior: Behavior normal

## 2023-02-24 NOTE — PATIENT INSTRUCTIONS
Now has new right foot ischemic wounds in lateral foot and heel  He has restenosis of distal SFA popliteal   Will need to be treated to prevent limb loss  Will get clearance for hydration protocol per nephrologist     Risks of kidney failure, groin hematoma, limb loss were discussed  We will minimize contrast and use iv hydration and CO2  He is supposed to have Egd for Batista's surveillance next week and plavix is held  I will ask to cancel the EGD and restart plavix

## 2023-02-24 NOTE — ASSESSMENT & PLAN NOTE
Multiple issues CKD4, CHF EF 22,   33-year-old male with HTN, HLD, DM, CAD, CABG, AS, bilateral asymptomatic carotid stenosis, PAD status post left endarterectomy 2015, left fem to anterior tibial bypass by Dr Nancy Liriano '15, s/p  Angioplasty to bypass graft stenoses '16, h/o Right SFA, popliteal and AT angioplasty in 2018  Prior a gram reviewed, extensive calcified plaque in right SFA, popliteal and tibials  Now has new right foot ischemic wounds in lateral foot and heel  He has restenosis of distal SFA popliteal   Will need to be treated to prevent limb loss  Will get clearance for hydration protocol per nephrologist     Risks of kidney failure, groin hematoma, limb loss were discussed  We will minimize contrast and use iv hydration and CO2  He is supposed to have Egd for Batista's surveillance next week and plavix is held    I will ask to cancel the EGD and restart plavix             Operative Scheduling Information:    Hospital:  [facility]    Physician:  Cheryl Wright    Surgery: Right leg angiogram with intervention with angioplasty, stent, atherectomy  CO2    Urgency:  Urgent: 2 weeks    Level:  Level 2: Outpatients to be scheduled for surgery with time dependent medical necessity within 2 weeks    Case Length:  2 hr    Post-op Bed:  Outpatient    OR Table:  IR    Equipment Needs:  Rep: shockwave    Medication Instructions:  Plavix:  Continue (do not hold)  Aspirin: Continue (do not hold)    Hydration:  Per protocol    Contrast Allergy:  no

## 2023-02-24 NOTE — TELEPHONE ENCOUNTER
Forwarded:  Alexandru Armenta Like saw Benita Abad, [birth year] , he is scheduled for EGD 3/2/23, his PAD right leg is getting worse  If it is not urgent I would recommend to postpone the routine egd for Batista’s surveillance  I don’t think he should stop plavix and I’ll book him for angiogram of leg    Also cancel per Dr Ricki Suarez  I called and lmom informing pt that we are canceling his procedure and once has clearance to hold his Plavix to please call back to reschedule  Will call pt again in a few days to make sure he received my message

## 2023-02-24 NOTE — PROGRESS NOTES
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 (HCC)  Multiple issues CKD4, CHF EF 22,   75-year-old male with HTN, HLD, DM, CAD, CABG, AS, bilateral asymptomatic carotid stenosis, PAD status post left endarterectomy 2015, left fem to anterior tibial bypass by Dr Nasim Gonzáles '15, s/p  Angioplasty to bypass graft stenoses '16, h/o Right SFA, popliteal and AT angioplasty in 2018  Prior a gram reviewed, extensive calcified plaque in right SFA, popliteal and tibials  Now has new right foot ischemic wounds in lateral foot and heel  He has restenosis of distal SFA popliteal   Will need to be treated to prevent limb loss  Will get clearance for hydration protocol per nephrologist     Risks of kidney failure, groin hematoma, limb loss were discussed  We will minimize contrast and use iv hydration and CO2  He is supposed to have Egd for Batista's surveillance next week and plavix is held  I will ask to cancel the EGD and restart plavix             Operative Scheduling Information:    Hospital:  [facility]    Physician:  Ese Wayne    Surgery: Right leg angiogram with intervention with angioplasty, stent, atherectomy  CO2    Urgency:  Urgent: 2 weeks    Level:  Level 2: Outpatients to be scheduled for surgery with time dependent medical necessity within 2 weeks    Case Length:  2 hr    Post-op Bed:  Outpatient    OR Table:  IR    Equipment Needs:  Rep: shockwave    Medication Instructions:  Plavix:  Continue (do not hold)  Aspirin: Continue (do not hold)    Hydration:  Per protocol    Contrast Allergy:  no      Benign hypertension with chronic kidney disease, stage IV ([facility] [address] )  Lab Results   Component Value Date    EGFR 21 02/10/2023    EGFR 28 12/07/2022    EGFR 29 11/08/2022    CREATININE 2 71 (H) 02/10/2023    CREATININE 2 13 (H) 12/07/2022    CREATININE 2 08 (H) 11/08/2022     Will need iv hydration pre and post hydration    Due to CHF will need to discuss with   Celina Ro  Diagnoses and all orders for this visit:    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 ([address] )  -     IR aortagram with run-off; Future    Benign hypertension with chronic kidney disease, stage IV ([address] )  -     IR aortagram with run-off; Future    Other orders  -     Nursing communication Apply gown prior to procedure; Standing  -     Have Patient Void On Call to Procedure Room; Standing  -     Insert and Maintain IV; Standing        Subjective:      Patient ID: Janes Massey  is a 76 y o  male  Patient is here to have an evaluation of a non-healing Rt foot wound  Pt states that the wound started in November HPI  Right foot lateral foot wound is very painful  Heel is also painful  He is seeing Dr Mary Espinosa  There are small wounds that are now appearing  He is getting electric shock pan  He takes aspirin and plavix  Smokes 1/4 PPD  The following portions of the patient's history were reviewed and updated as appropriate: allergies, current medications, past family history, past medical history, past social history, past surgical history and problem list     Review of Systems   Musculoskeletal: Positive for gait problem  Skin: Positive for color change and wound (Rt foot)  I have reviewed the ROS as entered and made changes as necessary  Objective:      /80 (BP Location: Left arm, Patient Position: Sitting)   Pulse 62   Resp 18   Ht 5' 10" (1 778 m)   Wt 78 5 kg (173 lb)   BMI 24 82 kg/m²          Physical Exam  Vitals and nursing note reviewed  Constitutional:       Appearance: Normal appearance  HENT:      Head: Normocephalic and atraumatic  Cardiovascular:      Rate and Rhythm: Normal rate and regular rhythm  Heart sounds: Murmur heard  Comments: trphasic Left Dp signal  Monophasic right DP signal    2+ left Fem AT bypass pulse  Pulmonary:      Breath sounds: Wheezing present  Abdominal:      Palpations: Abdomen is soft  Musculoskeletal:      Right lower leg: No edema  Left lower leg: No edema  Skin:     General: Skin is warm and dry  Capillary Refill: Capillary refill takes less than 2 seconds  Comments: Small ischemic ulcers in right lateral foot and heel  Dependant rubor of right foot  Neurological:      General: No focal deficit present  Mental Status: He is alert and oriented to person, place, and time     Psychiatric:         Mood and Affect: Mood normal          Behavior: Behavior normal

## 2023-02-24 NOTE — TELEPHONE ENCOUNTER
Verified patient's insurance   CONFIRMED - Patient's insurance is Medicare  Is patient requesting a call when authorization has been obtained? Patient did not request a call  Surgery Date: 3/2/23  Primary Surgeon: Eric Malik // Zeferino Swift (NPI: [id number])  Assisting Surgeon: Not Applicable (N/A)  Facility: [facility] (Tax: [id number] / NPI: [id number])  Inpatient / Outpatient: Outpatient  Level: 2    Clearance Received: No clearance ordered  Consent Received: Yes, scanned into Epic on 2/24/23  Medication Hold / Last Dose: Not Applicable (N/A)  IR Notified: 2/24/23  Rep   Notified: Kyle Hence notified 2/24/23  Equipment Needs: Not Applicable (N/A)  Vas Lab Requested: Not Applicable (N/A)  Patient Contacted: 2/24/23 Pollyann Duty s/w patient in patient hours with Dr Madhu Marin     Diagnosis: I70 [id number]  Procedure/ CPT Code(s): Angiogram // CPT: 80291, 77285 and 40070 // Procedure to take place in IR [Referral Based]    For varicose vein related procedures:   Last LEVDR: Not Applicable (N/A)  CEAP Classification: Not Applicable (N/A)  VCSS: Not Applicable (N/A)    Post Operative Date/ Time: To Be Determined (TBD)

## 2023-02-24 NOTE — ASSESSMENT & PLAN NOTE
Lab Results   Component Value Date    EGFR 21 02/10/2023    EGFR 28 12/07/2022    EGFR 29 11/08/2022    CREATININE 2 71 (H) 02/10/2023    CREATININE 2 13 (H) 12/07/2022    CREATININE 2 08 (H) 11/08/2022     Will need iv hydration pre and post hydration  Due to CHF will need to discuss with Dr Izabela Mcneil and Keo York

## 2023-02-25 DIAGNOSIS — N18.32 STAGE 3B CHRONIC KIDNEY DISEASE (HCC): Primary | ICD-10-CM

## 2023-02-25 DIAGNOSIS — N18.4 STAGE 4 CHRONIC KIDNEY DISEASE (HCC): ICD-10-CM

## 2023-02-25 NOTE — PROGRESS NOTES
Last creatinine was 2 7 on 2/10 which is higher than previous baseline  Will order repeat BMP to monitor renal function  IF renal function stable at cr 2 5 or lower, than okay to do angiogram    Has been taking torsemide 20 mg daily for a long time  Increase hydration     Discussed with patient  Hold lisinopril on the day of procedure and on the previous day  Hold torsemide on the day procedure    Discussed about the risk of contrast nephropathy and possibility of dialysis and he verbalized understanding, mentions he has persistent lower extremity pain and would like to get the procedure done    Recommend iv hydration with NS at 150 ml/hr one hour pre contrast and iv ns 100 ml/hr  Post contrast for 4 hrs    -he does have a history of CHF so would avoid excessive hydration

## 2023-02-27 ENCOUNTER — APPOINTMENT (OUTPATIENT)
Dept: LAB | Facility: MEDICAL CENTER | Age: 78
End: 2023-02-27

## 2023-02-27 ENCOUNTER — TELEPHONE (OUTPATIENT)
Dept: DERMATOLOGY | Facility: CLINIC | Age: 78
End: 2023-02-27

## 2023-02-27 DIAGNOSIS — N18.4 STAGE 4 CHRONIC KIDNEY DISEASE (HCC): ICD-10-CM

## 2023-02-27 LAB
ANION GAP SERPL CALCULATED.3IONS-SCNC: 2 MMOL/L (ref 4–13)
BUN SERPL-MCNC: 40 MG/DL (ref 5–25)
CALCIUM SERPL-MCNC: 9.2 MG/DL (ref 8.3–10.1)
CHLORIDE SERPL-SCNC: 108 MMOL/L (ref 96–108)
CO2 SERPL-SCNC: 30 MMOL/L (ref 21–32)
CREAT SERPL-MCNC: 1.93 MG/DL (ref 0.6–1.3)
GFR SERPL CREATININE-BSD FRML MDRD: 32 ML/MIN/1.73SQ M
GLUCOSE P FAST SERPL-MCNC: 159 MG/DL (ref 65–99)
POTASSIUM SERPL-SCNC: 4.5 MMOL/L (ref 3.5–5.3)
SODIUM SERPL-SCNC: 140 MMOL/L (ref 135–147)

## 2023-02-27 NOTE — TELEPHONE ENCOUNTER
Pt walked into office stating that he has a concerning spot on his ear that was previously worked on and would like to be seen sooner then his appt date  Pts main concern is whether it is safe for him to wait as long as Apri, please advise

## 2023-02-28 ENCOUNTER — TELEPHONE (OUTPATIENT)
Dept: OTHER | Facility: OTHER | Age: 78
End: 2023-02-28

## 2023-02-28 ENCOUNTER — TELEPHONE (OUTPATIENT)
Dept: NEPHROLOGY | Facility: CLINIC | Age: 78
End: 2023-02-28

## 2023-02-28 DIAGNOSIS — N18.4 CKD (CHRONIC KIDNEY DISEASE) STAGE 4, GFR 15-29 ML/MIN (HCC): Primary | ICD-10-CM

## 2023-02-28 NOTE — TELEPHONE ENCOUNTER
Called and spoke to pt whom informed that he received my message regarding cancelling his procedure and to call back once can hold his plavix

## 2023-02-28 NOTE — TELEPHONE ENCOUNTER
Renal function improved from creatinine 2 7 to creatinine 1 93 mg/dL, okay to undergo angiogram from nephrology side as renal function has improved  -Left message to hold torsemide on the day of IV contrast as well as to hold lisinopril on the day of procedure and the previous day  Recommend hydration with IV normal saline at 150 mL/h 1 hour precontrast followed by IV normal saline 100 mm/h for 4 hours postprocedure    Will order for repeat BMP to be done on March 5 which will be 3 days after the procedure / angiogram

## 2023-02-28 NOTE — TELEPHONE ENCOUNTER
P/t calling to get results be call back to discuss   Stated was waiting all day for a call back from the office

## 2023-03-01 ENCOUNTER — TELEPHONE (OUTPATIENT)
Dept: INTERVENTIONAL RADIOLOGY/VASCULAR | Facility: HOSPITAL | Age: 78
End: 2023-03-01

## 2023-03-01 NOTE — TELEPHONE ENCOUNTER
I spoke to Beto Cross this morning he was aware orf the message already said he saw his message from last night from Sejal   I did go over medications that need to be held again for completion over all patient had no questions for us and will be having his procedure tomorrow  Note is being sent to Evelyn Saul MD for review or recommendations

## 2023-03-02 ENCOUNTER — HOSPITAL ENCOUNTER (OUTPATIENT)
Dept: INTERVENTIONAL RADIOLOGY/VASCULAR | Facility: HOSPITAL | Age: 78
End: 2023-03-02
Attending: SURGERY

## 2023-03-02 VITALS
DIASTOLIC BLOOD PRESSURE: 62 MMHG | RESPIRATION RATE: 16 BRPM | HEIGHT: 70 IN | TEMPERATURE: 97.8 F | WEIGHT: 171 LBS | BODY MASS INDEX: 24.48 KG/M2 | SYSTOLIC BLOOD PRESSURE: 144 MMHG | OXYGEN SATURATION: 98 % | HEART RATE: 58 BPM

## 2023-03-02 DIAGNOSIS — N18.4 BENIGN HYPERTENSION WITH CHRONIC KIDNEY DISEASE, STAGE IV (HCC): ICD-10-CM

## 2023-03-02 DIAGNOSIS — I12.9 BENIGN HYPERTENSION WITH CHRONIC KIDNEY DISEASE, STAGE IV (HCC): ICD-10-CM

## 2023-03-02 DIAGNOSIS — I50.32 CHRONIC HEART FAILURE WITH PRESERVED EJECTION FRACTION (HCC): ICD-10-CM

## 2023-03-02 DIAGNOSIS — D63.1 ANEMIA DUE TO STAGE 4 CHRONIC KIDNEY DISEASE (HCC): Primary | ICD-10-CM

## 2023-03-02 DIAGNOSIS — N18.4 ANEMIA DUE TO STAGE 4 CHRONIC KIDNEY DISEASE (HCC): Primary | ICD-10-CM

## 2023-03-02 DIAGNOSIS — I70.234 ATHEROSCLEROSIS OF NATIVE ARTERY OF RIGHT LOWER EXTREMITY WITH ULCERATION OF MIDFOOT (HCC): ICD-10-CM

## 2023-03-02 LAB — GLUCOSE SERPL-MCNC: 122 MG/DL (ref 65–140)

## 2023-03-02 RX ORDER — CLONIDINE HYDROCHLORIDE 0.1 MG/1
0.1 TABLET ORAL ONCE
Status: COMPLETED | OUTPATIENT
Start: 2023-03-02 | End: 2023-03-02

## 2023-03-02 RX ORDER — FENTANYL CITRATE 50 UG/ML
INJECTION, SOLUTION INTRAMUSCULAR; INTRAVENOUS AS NEEDED
Status: COMPLETED | OUTPATIENT
Start: 2023-03-02 | End: 2023-03-02

## 2023-03-02 RX ORDER — HEPARIN SODIUM 1000 [USP'U]/ML
INJECTION, SOLUTION INTRAVENOUS; SUBCUTANEOUS AS NEEDED
Status: COMPLETED | OUTPATIENT
Start: 2023-03-02 | End: 2023-03-02

## 2023-03-02 RX ORDER — SODIUM CHLORIDE 9 MG/ML
1.25 INJECTION, SOLUTION INTRAVENOUS ONCE
Status: COMPLETED | OUTPATIENT
Start: 2023-03-02 | End: 2023-03-02

## 2023-03-02 RX ORDER — AMLODIPINE BESYLATE 10 MG/1
10 TABLET ORAL DAILY
Status: DISCONTINUED | OUTPATIENT
Start: 2023-03-02 | End: 2023-03-02

## 2023-03-02 RX ORDER — MIDAZOLAM HYDROCHLORIDE 2 MG/2ML
INJECTION, SOLUTION INTRAMUSCULAR; INTRAVENOUS AS NEEDED
Status: COMPLETED | OUTPATIENT
Start: 2023-03-02 | End: 2023-03-02

## 2023-03-02 RX ORDER — SODIUM CHLORIDE 9 MG/ML
125 INJECTION, SOLUTION INTRAVENOUS CONTINUOUS
Status: DISPENSED | OUTPATIENT
Start: 2023-03-02 | End: 2023-03-02

## 2023-03-02 RX ORDER — AMLODIPINE BESYLATE 10 MG/1
10 TABLET ORAL DAILY
Status: DISCONTINUED | OUTPATIENT
Start: 2023-03-03 | End: 2023-03-02

## 2023-03-02 RX ORDER — IODIXANOL 320 MG/ML
35 INJECTION, SOLUTION INTRAVASCULAR
Status: COMPLETED | OUTPATIENT
Start: 2023-03-02 | End: 2023-03-02

## 2023-03-02 RX ORDER — LIDOCAINE HYDROCHLORIDE 10 MG/ML
INJECTION, SOLUTION EPIDURAL; INFILTRATION; INTRACAUDAL; PERINEURAL AS NEEDED
Status: COMPLETED | OUTPATIENT
Start: 2023-03-02 | End: 2023-03-02

## 2023-03-02 RX ORDER — RIBOFLAVIN (VITAMIN B2) 100 MG
250 TABLET ORAL DAILY
COMMUNITY

## 2023-03-02 RX ORDER — SODIUM CHLORIDE 9 MG/ML
3 INJECTION, SOLUTION INTRAVENOUS CONTINUOUS
Status: DISPENSED | OUTPATIENT
Start: 2023-03-02 | End: 2023-03-02

## 2023-03-02 RX ADMIN — FENTANYL CITRATE 50 MCG: 50 INJECTION, SOLUTION INTRAMUSCULAR; INTRAVENOUS at 14:26

## 2023-03-02 RX ADMIN — MIDAZOLAM HYDROCHLORIDE 1 MG: 1 INJECTION, SOLUTION INTRAMUSCULAR; INTRAVENOUS at 14:08

## 2023-03-02 RX ADMIN — CLONIDINE HYDROCHLORIDE 0.1 MG: 0.1 TABLET ORAL at 18:11

## 2023-03-02 RX ADMIN — SODIUM CHLORIDE 125 ML/HR: 0.9 INJECTION, SOLUTION INTRAVENOUS at 16:19

## 2023-03-02 RX ADMIN — LIDOCAINE HYDROCHLORIDE 10 ML: 10 INJECTION, SOLUTION EPIDURAL; INFILTRATION; INTRACAUDAL at 14:09

## 2023-03-02 RX ADMIN — IODIXANOL 35 ML: 320 INJECTION, SOLUTION INTRAVASCULAR at 16:01

## 2023-03-02 RX ADMIN — HEPARIN SODIUM 5000 UNITS: 1000 INJECTION, SOLUTION INTRAVENOUS; SUBCUTANEOUS at 14:20

## 2023-03-02 RX ADMIN — MIDAZOLAM HYDROCHLORIDE 1 MG: 1 INJECTION, SOLUTION INTRAMUSCULAR; INTRAVENOUS at 14:27

## 2023-03-02 RX ADMIN — FENTANYL CITRATE 50 MCG: 50 INJECTION, SOLUTION INTRAMUSCULAR; INTRAVENOUS at 14:08

## 2023-03-02 RX ADMIN — SODIUM CHLORIDE 3 ML/KG/HR: 0.9 INJECTION, SOLUTION INTRAVENOUS at 13:30

## 2023-03-02 RX ADMIN — SODIUM CHLORIDE 1.25 ML/KG/HR: 0.9 INJECTION, SOLUTION INTRAVENOUS at 14:35

## 2023-03-02 RX ADMIN — AMLODIPINE BESYLATE 10 MG: 10 TABLET ORAL at 17:15

## 2023-03-02 NOTE — DISCHARGE INSTRUCTIONS
ARTERIOGRAM    WHAT YOU SHOULD KNOW:   An angiogram is a procedure to look at arteries in your body  Arteries are the blood vessels that carry blood from your heart to your body  AFTER YOU LEAVE:     Self-care:   Limit activity: Rest for the remainder of the day of your procedure  Have some one with you until the next morning  Keep your arm or leg straight as much as possible  Rest as much as possible, sitting lying or reclining  Walk only to go to the bathroom, to bed or to eat  If the angiogram catheter was put in your leg, use the stairs as little as possible  No driving  Keep your wound clean and dry  You may shower 24 hours after your procedure  The bandage you have on should fall off in 2-3 days  If there is any drainage from the puncture site, you should put on a clean bandage  Watch for bleeding and bruising: It is normal to have a bruise and soreness where the angiogram catheter went in  Medication: If your angiogram was performed to treat blockages in your leg arteries, it is strongly recommended that you take both an antiplatelet medication (like aspirin or Plavix) to prevent clotting AND a statin drug (like Lipitor or Crestor), even if you have normal cholesterol  If these drugs are not ordered for you please contact either your Vascular Surgery office or the Interventional Radiology Dept during normal daytime working hours  See Interventional Radiology telephone numbers below  You Should Have Follow up with the vascular surgeon   call [phone] with questions  Diet:   You may resume your regular diet, Sips of flat soda will help with mild nausea  Drink more liquids than usual for the next 24 hours        IMMEDIATELY Contact Interventional Radiology at [phone] Jimmy PATIENTS: Contact Interventional Radiology at [phone]) Joan Pierce PATIENTS: Contact Interventional Radiology at [phone]) if any of the following occur:   If your bruise gets larger or if you notice any active bleeding  APPLY DIRECT PRESSURE TO THE BLEEDING SITE  If you notice increased swelling or have increased pain at the puncture site   If you have any numbness or pain in the extremity of the puncture site   If that extremity seems cold or pale  You have fever greater than 101  Persistent nausea or vomitting    Follow up with your primary healthcare provider  as directed: Write down your questions so you remember to ask them during your visits

## 2023-03-02 NOTE — BRIEF OP NOTE (RAD/CATH)
Peripheral Vascular Intervention with VQI details -     Urgency: Urgent    Functional Status:  Ambulatory and capable of all self care but unable to carry out any work activities  Up and about more than 50% of waking hours  Ambulation: Amb = independently ambulatory    Leg Symptoms    Right: Ulcer/necrosis (gangrene): de rufina tissue loss due to peripheral arterial disease, not due to non-healing prior amputation       Tissue Loss Severity: Grade 1, Shallow = small shallow ulcer(s) on distal leg or foot, any exposed bone is only limited to distal phalanx (ie, minor tissue loss: limb salvage possible with simple digital amputation [1 or 2 digits], or skin coverage)  Infection: Grade 0, None = No symptoms or signs of infection  Left: Asymptomatic:  documented peripheral arterial disease without symptoms of claudication or ischemic pain      Treatment of Native Artery to Maintain Bypass Patency?:  No    COVID Information  no    Access   Number of Sites: 1     Access Site 1:     Side 1: Left    Site 1: Femoral Retrograde    Access Guidance 1:U/S    Largest Sheath Size 1: 5 Fr      Closure Device 1: MynxGrip      Number of Closure Devices: 1     Closure Device Outcome: Closure device successful         Procedure  Fluoro Time: 26 8 minutes  Contrast Volume: Visipaque 35 ml  DAP:  Gy cm2  CO2: yes  Anticoagulant: Heparin  Protamine: No  If Creatinine is > 1 2 or missing, FELIPE Prophylaxis IV saline     Treatment Details  Indication: Occlusive Disease,    Completion Assessment  Artery 1 treated: SFA        Left               Outflow: AT,PT,Peroneal: 2                  Was this Site previously treated?: Yes, PTA          TASC Grade: C          Total Treated Length: 0 cm          Total Occluded Length: 0 cm          Calcification: Severe (calcification on both sides of artery > half length of lesion)          Number of Treatment types (Devices):          Concomitant: None          Technical result: Technical failure, unable to cross lesion  Surgical    None     Post Procedure  Discharge status: Home  Procedure Complications: No    INDICATIONS:  Right foot ischemic wounds  Prior h/o PTA done in 2018    PROCEDURE PERFORMED:  1  Ultrasound-guided access of the left common femoral artery  2  Aortogram with pelvic runoff  3  Left lower extremity angiogram  4  Right lower extremity angiogram      SURGEON:  Maricruz Woods MD, [facility]    ANESTHESIA:  Local with sedation, total sedation time was 80 minutes, I supervised the RN in administration of medications (IV versed and fentanyl) and performed cardiopulmonary monitoring during the duration of the procedure  OPERATIVE PROCEDURE:  After patient identification and informed consent, the patient was taken to the procedure room and placed in a supine position  Adequate sedation was administered via IV route  The patient’s bilateral groins were cleaned and draped in sterile surgical fashion using Chlorhexidine  1% local Lidocaine was injected into the skin and subcutaneous tissue overlying the left  femoral pulsation  Under ultrasound guidance a 19G needle was used to access the left common femoral artery  The common femoral artery was patent with prior endarterectomy   There were posterior calcifications  After obtaining pulsatile flow, a micro guidewire was inserted through the needle and the access site was enlarged with a #11 scalpel  The needle was removed and a 4 Western Florina micro access sheath was inserted over the guidewire using Seldinger technique  The micro wire was removed and a Venuemobson wire was advanced under fluoroscopic guidance through the micro sheath and parked in the proximal abdominal aorta under fluoroscopic guidance  The micro sheath was removed and a then a  5 Nepalese sheath was inserted again using Seldinger technique over the wire        An Omni Flush catheter was next advanced over the guidewire and fluoroscopic guidance was used to park the catheter in the proximal abdominal aorta  Patient was then given 5000 units of IV heparin  Aortogram was performed with CO2  Using a Bentson and Omni flush catheter we went up and over and due to severe angulation of the aortic bifurcation we could only track 4Fr Terumo glidecath and selected the right external iliac artery  right lower extremity runoff was performed with CO2 and low dose contrast       Multiple attempts were made to track catheter in to the right SFA past the calcified lesion in the proximal SFA using different wire combinations of V14,018 glide advantage, 018  wire and 018 Navicross and 4x40 015 Yehuda balloon but were unable to do so due to stiff non compliant aortic bifurcation, heavily calcified distal common femoral and proximal SFA    Thus it appears that best approach is right common femoral endarterectomy and antegrade intervention of SFA, popliteal    Following which we did a left leg runoff via the sheath  We then successfully deployed the Mynx as per 's instructions to achieve hemostasis at the puncture site  At the end of the case patient's bilateral feet were well perfused and had good cap refill  There is 2+ Left AT bypass pulse and biphasic right AT signal     RADIOGRAPHIC SUPERVISION AND INTERPRETATION OF TEST:    1  Abdominal aortogram findings -   Abdominal aorta - patent with dense calcifications      2  Pelvic runoff findings -   Left common iliac artery-heavily calcified patent  Left internal iliac artery-heavily calcified patent  Left external iliac artery-heavily calcified patent    Right common iliac artery-heavily calcified patent  Right internal iliac artery-heavily calcified patent  Right external iliac artery-heavily calcified patent    2    Left lower extremity angiogram findings-    common femoral artery- prior endarterectomy patent  Profunda femorals artery- patent  Superficial femoral artery-heavily calcified patent  Popliteal artery-occluded  Anterior tibial artery-patent  Prior SFA to AT bypass is patent    3  Right lower extremity angiogram findings-    common femoral artery- heavily calcified patent  Profunda femorals artery- occuded  Superficial femoral artery-nearly occluded proximal calcified stenosis  Popliteal artery-heavily calcified patent  Anterior tibial artery-heavily calcified patent  Tibioperoneal trunk-heavily calcified patent  Posterior tibial artery-occluded  Peroneal artery-heavily calcified patent  Plantar arch-not visualized      Contrast Type/Amount -  35 CC VISIPAQUE 320    Fluoro Time (Mandatory) -  26 8 MIN    Devices - MYNX     SEDATION TIME: 80 MIN    PLAN:     best approach is right common femoral endarterectomy and antegrade intervention of SFA, popliteal artery with shockwave in near future  Iv hydration postop

## 2023-03-03 ENCOUNTER — TELEPHONE (OUTPATIENT)
Dept: OTHER | Facility: HOSPITAL | Age: 78
End: 2023-03-03

## 2023-03-03 ENCOUNTER — TELEPHONE (OUTPATIENT)
Dept: VASCULAR SURGERY | Facility: CLINIC | Age: 78
End: 2023-03-03

## 2023-03-03 DIAGNOSIS — I70.234 ATHEROSCLEROSIS OF NATIVE ARTERY OF RIGHT LOWER EXTREMITY WITH ULCERATION OF MIDFOOT (HCC): Primary | ICD-10-CM

## 2023-03-03 NOTE — TELEPHONE ENCOUNTER
Good morning,    Dr Kushal Bean is requesting cardiology and nephrology clearances for Brittany Poon to move forward with scheduling him for a right femoral endarterectomy, right sfa/pop shock wave angioplasty  Brittany Poon was seen by Dr Reinaldo Lazar on 9/20/22  Brittany Poon was seen by Dr Ross Gavin on 1/5/23  Please advise if patient can be cleared from these visits or if he will need to be seen in the office  If he will need to be seen in the office please contact him to schedule

## 2023-03-03 NOTE — TELEPHONE ENCOUNTER
ZHANNA Moreno MD Just now (3:20 PM)     Hi! He should also have IVF pre and post post procedure: Recommend  ml/hour prior to surgery and 100 ml 4 hour post procedure      Thanks   Bernard Cherry routed conversation to [facility] 6 minutes ago (3:13 PM)     ZHANNA Moreno MD 17 minutes ago (3:03 PM)     Patient is cleared from renal standpoint with these recommendations:  hold lisinopril and lasix the day before and day of procedure  Thank you  Bryon Conti

## 2023-03-03 NOTE — TELEPHONE ENCOUNTER
MD Jacob Mueller; [facility] Surgery Coordinator; Tammi Colby MD; Nephrology Hodan Mendoza; Cardiology [place] [facility]; Cardiology Cardiac Clearance Chidi; [facility] Clearance Pool Just now (2:22 PM)     Patient at acceptable cardiovascular risk from a cardiac standpoint   Thanks

## 2023-03-03 NOTE — TELEPHONE ENCOUNTER
Called and spoke with patient who reports had IR procedure today and is for BMP Sunday, but unable to get to an open lab on Sunday  Will check BMP on Monday  Per vascular surgery for  right femoral endarterectomy, right sfa/pop shock wave angioplasty in the future and is requesting nephrology clearence  Patient was seen with Dr Betsy Walters on 1/5/2023  From renal standpoint patient is cleared with these recommendations:  Hold torsemide and Lisinopril the day before and day of procedure  Recommend  ml/hour prior to surgery and 100 ml 4 hour post procedure

## 2023-03-03 NOTE — TELEPHONE ENCOUNTER
Called vascular center to make sure they received Angel Olsen message for cleared  Nurse said yes they received the message

## 2023-03-03 NOTE — TELEPHONE ENCOUNTER
Pt will be seeing Dr Carolyn Garcia on 3/16 for post angio appt  Please start process of cardiac clearance with Dr Douglas Winter and nephro clearance from Dr Beverly Rothman for femoral endarterectomy       best approach is right common femoral endarterectomy and antegrade intervention of SFA, popliteal artery with shockwave in near future       Operative Scheduling Information:     Hospital:   [facility]     Physician:   Davida     Surgery: Right femoral endarterectomy, right sfa/pop shock wave angioplasty     Urgency:   Standard     Level:   Level 3: Outpatients to be scheduled for elective surgery than can be delayed up to 4 weeks without reasonable expectation of detriment to patient     Case Length:   Normal     Post-op Bed:   ICU     OR Table:   Discovery     Equipment Needs:   Product/Implant: shockwave     Medication Instructions:   Aspirin:   Continue (do not hold)   Plavix:  Continue (do not hold)     Hydration:   standard     Contrast Allergy:   no

## 2023-03-06 ENCOUNTER — APPOINTMENT (OUTPATIENT)
Dept: LAB | Facility: MEDICAL CENTER | Age: 78
End: 2023-03-06

## 2023-03-06 DIAGNOSIS — D63.1 ANEMIA DUE TO STAGE 4 CHRONIC KIDNEY DISEASE (HCC): ICD-10-CM

## 2023-03-06 DIAGNOSIS — N18.4 ANEMIA DUE TO STAGE 4 CHRONIC KIDNEY DISEASE (HCC): ICD-10-CM

## 2023-03-06 DIAGNOSIS — N18.4 CKD (CHRONIC KIDNEY DISEASE) STAGE 4, GFR 15-29 ML/MIN (HCC): ICD-10-CM

## 2023-03-06 LAB
ANION GAP SERPL CALCULATED.3IONS-SCNC: 2 MMOL/L (ref 4–13)
BUN SERPL-MCNC: 40 MG/DL (ref 5–25)
CALCIUM SERPL-MCNC: 6.4 MG/DL (ref 8.3–10.1)
CHLORIDE SERPL-SCNC: 107 MMOL/L (ref 96–108)
CO2 SERPL-SCNC: 28 MMOL/L (ref 21–32)
CREAT SERPL-MCNC: 1.95 MG/DL (ref 0.6–1.3)
GFR SERPL CREATININE-BSD FRML MDRD: 31 ML/MIN/1.73SQ M
GLUCOSE P FAST SERPL-MCNC: 155 MG/DL (ref 65–99)
POTASSIUM SERPL-SCNC: 4.1 MMOL/L (ref 3.5–5.3)
SODIUM SERPL-SCNC: 137 MMOL/L (ref 135–147)

## 2023-03-09 ENCOUNTER — PREP FOR PROCEDURE (OUTPATIENT)
Dept: VASCULAR SURGERY | Facility: CLINIC | Age: 78
End: 2023-03-09

## 2023-03-09 NOTE — TELEPHONE ENCOUNTER
Katja Sharif,    Over how many hours prior should the 150 mL be delivered? I read it as over 1 hour prior but then see that there is no length of time listed for prior

## 2023-03-09 NOTE — TELEPHONE ENCOUNTER
Authorization requirements reviewed  Please refer to [address] / Pily Ours number [id number] for case updates

## 2023-03-09 NOTE — TELEPHONE ENCOUNTER
Verified patient's insurance   CONFIRMED - Patient's insurance is Medicare  Is patient requesting a call when authorization has been obtained? Patient did not request a call  Surgery Date: 3/23/23  Primary Surgeon: Minnie Ordonez // Tressa Gallardo (NPI: [id number])  Assisting Surgeon: Not Applicable (N/A)  Facility: [facility] (Tax: [id number] / [facility]: [id number])  Inpatient / Outpatient: Inpatient  Level: 3    Clearance Received: Yes, Cardio and Neph  Consent Received: Consent will be signed day of procedure  Medication Hold / Last Dose: Not Applicable (N/A)  IR Notified: 3/9/23  Rep   Notified: Shockwave Allayne Minium notified 3/9/23   Equipment Needs: Not Applicable (N/A)  Vas Lab Requested: Not Applicable (N/A)  Patient Contacted: 3/9/23    Diagnosis: [id number]  Procedure/ CPT Code(s): Common Femoral Endartectomy // CPT: 44761 and 88562   -  Common femoral endarterectomy and antegrade intervention of SFA, popliteal artery with shockwave     For varicose vein related procedures:   Last LEVDR: Not Applicable (N/A)  CEAP Classification: Not Applicable (N/A)  VCSS: Not Applicable (N/A)    Post Operative Date/ Time: 4/5/23 , 3:30pm [place] with Tressa Gallardo (NPI: [id number])     H&P to be updated on arrival   Consent will be signed on admit   Pt will have blood work and ekg done at [facility]   No medication holds requested by Dr Loreta Ferris - continue ASA and Plavix   Pt was cleared by Dr Jac Quintana 3/3/23 and Maxine Dhillon 3/3/23 (in telephone note dated 3/3/23)    Hydration: 150 mL/hour for 1 hour prior and 100 mL/hour for 4 hours post per ZHANNA Dhillon

## 2023-03-10 ENCOUNTER — PREP FOR PROCEDURE (OUTPATIENT)
Dept: VASCULAR SURGERY | Facility: CLINIC | Age: 78
End: 2023-03-10

## 2023-03-10 ENCOUNTER — ANESTHESIA EVENT (OUTPATIENT)
Dept: PERIOP | Facility: HOSPITAL | Age: 78
End: 2023-03-10

## 2023-03-10 ENCOUNTER — TELEPHONE (OUTPATIENT)
Dept: DERMATOLOGY | Facility: CLINIC | Age: 78
End: 2023-03-10

## 2023-03-10 NOTE — TELEPHONE ENCOUNTER
Cam Carrillo called and would like to know if he needs to keep his appointment with Dr Norma Michaud to review his angio results on 3/16/23 since he is already scheduled for right common femoral endarterectomy and antegrade intervention of SFA, popliteal artery with shockwave with Dr Gemma Potter on 3/23/23 per Dr Su Vivas message to us on 3/3/23  He was seeing Dr Norma Michaud for his post op angio because Dr Gemma Potter will be on vacation when he needed to be seen  Please advise

## 2023-03-10 NOTE — TELEPHONE ENCOUNTER
ZHANNA Layton  to Me      3:42 PM   Hi!     ml/hour for 2 hours before and 100 ml 4 hours post   Thanks   Nirmal Owens

## 2023-03-10 NOTE — TELEPHONE ENCOUNTER
Lvm to pt stating that 4/4 is the soonest appt date that we can get him in before (soonest 7 day slot)  his orig appt date is 4/25   Lvm to pt with our cb number and the option to schedule for 4/4, if that date is taken then any 7 day slot is fine to place in!

## 2023-03-12 DIAGNOSIS — N18.4 CKD (CHRONIC KIDNEY DISEASE) STAGE 4, GFR 15-29 ML/MIN (HCC): Primary | ICD-10-CM

## 2023-03-13 ENCOUNTER — TELEPHONE (OUTPATIENT)
Dept: NEPHROLOGY | Facility: CLINIC | Age: 78
End: 2023-03-13

## 2023-03-13 NOTE — TELEPHONE ENCOUNTER
----- Message from Andria Amado MD sent at 3/12/2023  2:22 PM EDT -----  Please inform patient to go for BMP check on 3/28 to monitor renal function post angiogram

## 2023-03-13 NOTE — TELEPHONE ENCOUNTER
I lm for Beto Cross reminding him to get him BMP done on 3/28 after his angiogram to follow up on his renal function

## 2023-03-14 ENCOUNTER — APPOINTMENT (OUTPATIENT)
Dept: LAB | Facility: MEDICAL CENTER | Age: 78
End: 2023-03-14

## 2023-03-14 ENCOUNTER — CLINICAL SUPPORT (OUTPATIENT)
Dept: URGENT CARE | Facility: MEDICAL CENTER | Age: 78
End: 2023-03-14

## 2023-03-14 ENCOUNTER — LAB REQUISITION (OUTPATIENT)
Dept: LAB | Facility: HOSPITAL | Age: 78
End: 2023-03-14

## 2023-03-14 DIAGNOSIS — I70.234 ATHEROSCLEROSIS OF NATIVE ARTERIES OF RIGHT LEG WITH ULCERATION OF HEEL AND MIDFOOT (HCC): ICD-10-CM

## 2023-03-14 DIAGNOSIS — I70.234 ATHEROSCLEROSIS OF NATIVE ARTERY OF RIGHT LOWER EXTREMITY WITH ULCERATION OF MIDFOOT (HCC): ICD-10-CM

## 2023-03-14 LAB
ABO GROUP BLD: NORMAL
BLD GP AB SCN SERPL QL: NEGATIVE
ERYTHROCYTE [DISTWIDTH] IN BLOOD BY AUTOMATED COUNT: 15.1 % (ref 11.6–15.1)
HCT VFR BLD AUTO: 35.7 % (ref 36.5–49.3)
HGB BLD-MCNC: 11.5 G/DL (ref 12–17)
INR PPP: 0.94 (ref 0.84–1.19)
MCH RBC QN AUTO: 30.2 PG (ref 26.8–34.3)
MCHC RBC AUTO-ENTMCNC: 32.2 G/DL (ref 31.4–37.4)
MCV RBC AUTO: 94 FL (ref 82–98)
PLATELET # BLD AUTO: 187 THOUSANDS/UL (ref 149–390)
PMV BLD AUTO: 11.6 FL (ref 8.9–12.7)
PROTHROMBIN TIME: 12.8 SECONDS (ref 11.6–14.5)
RBC # BLD AUTO: 3.81 MILLION/UL (ref 3.88–5.62)
RH BLD: POSITIVE
SPECIMEN EXPIRATION DATE: NORMAL
WBC # BLD AUTO: 7.19 THOUSAND/UL (ref 4.31–10.16)

## 2023-03-17 DIAGNOSIS — N17.9 AKI (ACUTE KIDNEY INJURY) (HCC): Primary | ICD-10-CM

## 2023-03-17 NOTE — PRE-PROCEDURE INSTRUCTIONS
Pre-Surgery Instructions:   Medication Instructions   • acetaminophen (TYLENOL) 325 mg tablet Uses PRN- OK to take day of surgery   • allopurinol (ZYLOPRIM) 300 mg tablet Take day of surgery  • amLODIPine (NORVASC) 10 mg tablet Take day of surgery  • Ascorbic Acid (vitamin C) 100 MG tablet Stop 3/17  Do not take morning of surgery   • aspirin (ECOTRIN LOW STRENGTH) 81 mg EC tablet Take day of surgery  • atorvastatin (LIPITOR) 80 mg tablet Take night before surgery   • calcitriol (ROCALTROL) 0 25 mcg capsule Continue regular schedule  • clopidogrel (PLAVIX) 75 mg tablet Take day of surgery  • lisinopril (ZESTRIL) 10 mg tablet Stop taking 1 day prior to surgery  • metoprolol succinate (TOPROL-XL) 25 mg 24 hr tablet Take day of surgery  • pancrelipase, Lip-Prot-Amyl, (CREON) 24,000 units Hold day of surgery  • pantoprazole (PROTONIX) 40 mg tablet Take day of surgery  • potassium chloride (K-DUR,KLOR-CON) 10 mEq tablet Hold day of surgery  • torsemide (DEMADEX) 20 mg tablet Stop taking 1 day prior to surgery  Medication instructions for day surgery reviewed  Please use only a sip of water to take your instructed medications  Avoid all over the counter vitamins, supplements and NSAIDS for one week prior to surgery per anesthesia guidelines  Tylenol is ok to take as needed  You will receive a call one business day prior to surgery with an arrival time and hospital directions  If your surgery is scheduled on a Monday, the hospital will be calling you on the Friday prior to your surgery  If you have not heard from anyone by 8pm, please call the hospital supervisor through the hospital  at [phone]  Lyndon Arriaga [phone])  Do not eat or drink anything after midnight the night before your surgery, including candy, mints, lifesavers, or chewing gum  Do not drink alcohol 24hrs before your surgery  Try not to smoke at least 24hrs before your surgery         Follow the pre surgery "showering instructions as listed in the [facility] Surgical Experience Booklet” or otherwise provided by your surgeon's office  Do not shave the surgical area 24 hours before surgery  Do not apply any lotions, creams, including makeup, cologne, deodorant, or perfumes after showering on the day of your surgery  No contact lenses, eye make-up, or artificial eyelashes  Remove nail polish, including gel polish, and any artificial, gel, or acrylic nails if possible  Remove all jewelry including rings and body piercing jewelry  Wear causal clothing that is easy to take on and off  Consider your type of surgery  Keep any valuables, jewelry, piercings at home  Please bring any specially ordered equipment (sling, braces) if indicated  Arrange for a responsible person to drive you to and from the hospital on the day of your surgery  Visitor Guidelines discussed  Call the surgeon's office with any new illnesses, exposures, or additional questions prior to surgery  Please reference your [facility] Surgical Experience Booklet” for additional information to prepare for your upcoming surgery  See Geriatric Assessment below       • Cognitive Assessment: 3  • CAM:   • TUG <15 sec:  • Falls (last 6 months): 0  • Hand  score:  -Attempt 1:  -Attempt 2:  -Attempt 3:  • Ector Total Score: 18  • PHQ- 9 Depression Scale: 3  • Nutrition Assessment Score: 12  • METS:4 74  Activity limited by foot pain  • Health goals:  -What are your overall health goals? (quit smoking, wt  loss, rest, decrease stress) \"To live pain free and as long as possible  \"    -What brings you strength? (family, friends, Muslim, health) 'Family and my pets  \"    -What activities are important to you? 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

## 2023-03-19 LAB
ATRIAL RATE: 57 BPM
ATRIAL RATE: 57 BPM
P AXIS: 76 DEGREES
P AXIS: 76 DEGREES
PR INTERVAL: 208 MS
PR INTERVAL: 208 MS
QRS AXIS: 68 DEGREES
QRS AXIS: 68 DEGREES
QRSD INTERVAL: 92 MS
QRSD INTERVAL: 92 MS
QT INTERVAL: 466 MS
QT INTERVAL: 466 MS
QTC INTERVAL: 453 MS
QTC INTERVAL: 453 MS
T WAVE AXIS: 233 DEGREES
T WAVE AXIS: 233 DEGREES
VENTRICULAR RATE: 57 BPM
VENTRICULAR RATE: 57 BPM

## 2023-03-23 ENCOUNTER — APPOINTMENT (OUTPATIENT)
Dept: RADIOLOGY | Facility: HOSPITAL | Age: 78
End: 2023-03-23

## 2023-03-23 ENCOUNTER — HOSPITAL ENCOUNTER (INPATIENT)
Facility: HOSPITAL | Age: 78
LOS: 4 days | Discharge: HOME HEALTH | End: 2023-03-27
Attending: SURGERY | Admitting: SURGERY

## 2023-03-23 ENCOUNTER — ANESTHESIA (OUTPATIENT)
Dept: PERIOP | Facility: HOSPITAL | Age: 78
End: 2023-03-23

## 2023-03-23 DIAGNOSIS — I70.209 ATHEROSCLEROSIS OF ARTERIES OF EXTREMITIES (HCC): ICD-10-CM

## 2023-03-23 DIAGNOSIS — Z01.89 ENCOUNTER FOR GERIATRIC ASSESSMENT: Primary | ICD-10-CM

## 2023-03-23 DIAGNOSIS — I70.234 ATHEROSCLEROSIS OF NATIVE ARTERY OF RIGHT LOWER EXTREMITY WITH ULCERATION OF MIDFOOT (HCC): ICD-10-CM

## 2023-03-23 LAB
ANION GAP SERPL CALCULATED.3IONS-SCNC: 6 MMOL/L (ref 4–13)
APTT PPP: 29 SECONDS (ref 23–37)
BASE EXCESS BLDA CALC-SCNC: -2 MMOL/L (ref -2–3)
BASE EXCESS BLDA CALC-SCNC: -2 MMOL/L (ref -2–3)
BASOPHILS # BLD AUTO: 0.04 THOUSANDS/ÂΜL (ref 0–0.1)
BASOPHILS NFR BLD AUTO: 0 % (ref 0–1)
BUN SERPL-MCNC: 36 MG/DL (ref 5–25)
CA-I BLD-SCNC: 1.17 MMOL/L (ref 1.12–1.32)
CA-I BLD-SCNC: 1.25 MMOL/L (ref 1.12–1.32)
CALCIUM SERPL-MCNC: 8 MG/DL (ref 8.4–10.2)
CHLORIDE SERPL-SCNC: 112 MMOL/L (ref 96–108)
CO2 SERPL-SCNC: 23 MMOL/L (ref 21–32)
CREAT SERPL-MCNC: 2.07 MG/DL (ref 0.6–1.3)
EOSINOPHIL # BLD AUTO: 0.27 THOUSAND/ÂΜL (ref 0–0.61)
EOSINOPHIL NFR BLD AUTO: 3 % (ref 0–6)
ERYTHROCYTE [DISTWIDTH] IN BLOOD BY AUTOMATED COUNT: 15.6 % (ref 11.6–15.1)
FLUAV RNA RESP QL NAA+PROBE: NEGATIVE
FLUBV RNA RESP QL NAA+PROBE: NEGATIVE
GFR SERPL CREATININE-BSD FRML MDRD: 29 ML/MIN/1.73SQ M
GLUCOSE SERPL-MCNC: 159 MG/DL (ref 65–140)
GLUCOSE SERPL-MCNC: 163 MG/DL (ref 65–140)
GLUCOSE SERPL-MCNC: 165 MG/DL (ref 65–140)
GLUCOSE SERPL-MCNC: 174 MG/DL (ref 65–140)
GLUCOSE SERPL-MCNC: 186 MG/DL (ref 65–140)
GLUCOSE SERPL-MCNC: 187 MG/DL (ref 65–140)
GLUCOSE SERPL-MCNC: 204 MG/DL (ref 65–140)
HCO3 BLDA-SCNC: 23.7 MMOL/L (ref 22–28)
HCO3 BLDA-SCNC: 23.8 MMOL/L (ref 22–28)
HCT VFR BLD AUTO: 29.1 % (ref 36.5–49.3)
HCT VFR BLD CALC: 25 % (ref 36.5–49.3)
HCT VFR BLD CALC: 27 % (ref 36.5–49.3)
HGB BLD-MCNC: 9.3 G/DL (ref 12–17)
HGB BLDA-MCNC: 8.5 G/DL (ref 12–17)
HGB BLDA-MCNC: 9.2 G/DL (ref 12–17)
IMM GRANULOCYTES # BLD AUTO: 0.06 THOUSAND/UL (ref 0–0.2)
IMM GRANULOCYTES NFR BLD AUTO: 1 % (ref 0–2)
INR PPP: 1.18 (ref 0.84–1.19)
LYMPHOCYTES # BLD AUTO: 1.11 THOUSANDS/ÂΜL (ref 0.6–4.47)
LYMPHOCYTES NFR BLD AUTO: 12 % (ref 14–44)
MCH RBC QN AUTO: 29.6 PG (ref 26.8–34.3)
MCHC RBC AUTO-ENTMCNC: 32 G/DL (ref 31.4–37.4)
MCV RBC AUTO: 93 FL (ref 82–98)
MONOCYTES # BLD AUTO: 0.66 THOUSAND/ÂΜL (ref 0.17–1.22)
MONOCYTES NFR BLD AUTO: 7 % (ref 4–12)
NEUTROPHILS # BLD AUTO: 7.41 THOUSANDS/ÂΜL (ref 1.85–7.62)
NEUTS SEG NFR BLD AUTO: 77 % (ref 43–75)
NRBC BLD AUTO-RTO: 0 /100 WBCS
PCO2 BLD: 25 MMOL/L (ref 21–32)
PCO2 BLD: 25 MMOL/L (ref 21–32)
PCO2 BLD: 42.8 MM HG (ref 36–44)
PCO2 BLD: 44.9 MM HG (ref 36–44)
PH BLD: 7.33 [PH] (ref 7.35–7.45)
PH BLD: 7.35 [PH] (ref 7.35–7.45)
PLATELET # BLD AUTO: 123 THOUSANDS/UL (ref 149–390)
PMV BLD AUTO: 10.7 FL (ref 8.9–12.7)
PO2 BLD: 264 MM HG (ref 75–129)
PO2 BLD: 270 MM HG (ref 75–129)
POTASSIUM BLD-SCNC: 4.5 MMOL/L (ref 3.5–5.3)
POTASSIUM BLD-SCNC: 4.7 MMOL/L (ref 3.5–5.3)
POTASSIUM SERPL-SCNC: 4.7 MMOL/L (ref 3.5–5.3)
PROTHROMBIN TIME: 15.1 SECONDS (ref 11.6–14.5)
RBC # BLD AUTO: 3.14 MILLION/UL (ref 3.88–5.62)
RSV RNA RESP QL NAA+PROBE: NEGATIVE
SAO2 % BLD FROM PO2: 100 % (ref 60–85)
SAO2 % BLD FROM PO2: 100 % (ref 60–85)
SARS-COV-2 RNA RESP QL NAA+PROBE: NEGATIVE
SODIUM BLD-SCNC: 143 MMOL/L (ref 136–145)
SODIUM BLD-SCNC: 143 MMOL/L (ref 136–145)
SODIUM SERPL-SCNC: 141 MMOL/L (ref 135–147)
SPECIMEN SOURCE: ABNORMAL
SPECIMEN SOURCE: ABNORMAL
WBC # BLD AUTO: 9.55 THOUSAND/UL (ref 4.31–10.16)

## 2023-03-23 PROCEDURE — 04FK3ZZ FRAGMENTATION OF RIGHT FEMORAL ARTERY, PERCUTANEOUS APPROACH: ICD-10-PCS | Performed by: SURGERY

## 2023-03-23 PROCEDURE — 047M3ZZ DILATION OF RIGHT POPLITEAL ARTERY, PERCUTANEOUS APPROACH: ICD-10-PCS | Performed by: SURGERY

## 2023-03-23 PROCEDURE — 04FM3ZZ FRAGMENTATION OF RIGHT POPLITEAL ARTERY, PERCUTANEOUS APPROACH: ICD-10-PCS | Performed by: SURGERY

## 2023-03-23 PROCEDURE — 3E05317 INTRODUCTION OF OTHER THROMBOLYTIC INTO PERIPHERAL ARTERY, PERCUTANEOUS APPROACH: ICD-10-PCS | Performed by: SURGERY

## 2023-03-23 PROCEDURE — 04CK0ZZ EXTIRPATION OF MATTER FROM RIGHT FEMORAL ARTERY, OPEN APPROACH: ICD-10-PCS | Performed by: SURGERY

## 2023-03-23 PROCEDURE — 047K3ZZ DILATION OF RIGHT FEMORAL ARTERY, PERCUTANEOUS APPROACH: ICD-10-PCS | Performed by: SURGERY

## 2023-03-23 PROCEDURE — 04UK0KZ SUPPLEMENT RIGHT FEMORAL ARTERY WITH NONAUTOLOGOUS TISSUE SUBSTITUTE, OPEN APPROACH: ICD-10-PCS | Performed by: SURGERY

## 2023-03-23 PROCEDURE — 047P3ZZ DILATION OF RIGHT ANTERIOR TIBIAL ARTERY, PERCUTANEOUS APPROACH: ICD-10-PCS | Performed by: SURGERY

## 2023-03-23 PROCEDURE — 30233N1 TRANSFUSION OF NONAUTOLOGOUS RED BLOOD CELLS INTO PERIPHERAL VEIN, PERCUTANEOUS APPROACH: ICD-10-PCS | Performed by: ANESTHESIOLOGY

## 2023-03-23 DEVICE — XENOSURE BIOLOGIC PATCH, 0.8CM X 8CM, EIFU
Type: IMPLANTABLE DEVICE | Site: GROIN | Status: FUNCTIONAL
Brand: XENOSURE BIOLOGIC PATCH

## 2023-03-23 RX ORDER — ATORVASTATIN CALCIUM 80 MG/1
80 TABLET, FILM COATED ORAL
Status: DISCONTINUED | OUTPATIENT
Start: 2023-03-23 | End: 2023-03-27 | Stop reason: HOSPADM

## 2023-03-23 RX ORDER — HEPARIN SODIUM 200 [USP'U]/100ML
INJECTION, SOLUTION INTRAVENOUS
Status: COMPLETED | OUTPATIENT
Start: 2023-03-23 | End: 2023-03-23

## 2023-03-23 RX ORDER — SODIUM CHLORIDE, SODIUM LACTATE, POTASSIUM CHLORIDE, CALCIUM CHLORIDE 600; 310; 30; 20 MG/100ML; MG/100ML; MG/100ML; MG/100ML
INJECTION, SOLUTION INTRAVENOUS CONTINUOUS PRN
Status: DISCONTINUED | OUTPATIENT
Start: 2023-03-23 | End: 2023-03-23

## 2023-03-23 RX ORDER — OXYCODONE HYDROCHLORIDE 5 MG/1
5 TABLET ORAL EVERY 4 HOURS PRN
Status: DISCONTINUED | OUTPATIENT
Start: 2023-03-23 | End: 2023-03-23 | Stop reason: SDUPTHER

## 2023-03-23 RX ORDER — SODIUM CHLORIDE 9 MG/ML
125 INJECTION, SOLUTION INTRAVENOUS CONTINUOUS
Status: DISCONTINUED | OUTPATIENT
Start: 2023-03-23 | End: 2023-03-23

## 2023-03-23 RX ORDER — PROTAMINE SULFATE 10 MG/ML
INJECTION, SOLUTION INTRAVENOUS AS NEEDED
Status: DISCONTINUED | OUTPATIENT
Start: 2023-03-23 | End: 2023-03-23

## 2023-03-23 RX ORDER — ALLOPURINOL 100 MG/1
300 TABLET ORAL DAILY
Status: DISCONTINUED | OUTPATIENT
Start: 2023-03-24 | End: 2023-03-27 | Stop reason: HOSPADM

## 2023-03-23 RX ORDER — HYDROMORPHONE HCL/PF 1 MG/ML
0.5 SYRINGE (ML) INJECTION
Status: DISCONTINUED | OUTPATIENT
Start: 2023-03-23 | End: 2023-03-23 | Stop reason: HOSPADM

## 2023-03-23 RX ORDER — SODIUM CHLORIDE 9 MG/ML
125 INJECTION, SOLUTION INTRAVENOUS CONTINUOUS
Status: DISCONTINUED | OUTPATIENT
Start: 2023-03-23 | End: 2023-03-24

## 2023-03-23 RX ORDER — INSULIN LISPRO 100 [IU]/ML
1-6 INJECTION, SOLUTION INTRAVENOUS; SUBCUTANEOUS
Status: DISCONTINUED | OUTPATIENT
Start: 2023-03-23 | End: 2023-03-27 | Stop reason: HOSPADM

## 2023-03-23 RX ORDER — CEFAZOLIN SODIUM 1 G/3ML
INJECTION, POWDER, FOR SOLUTION INTRAMUSCULAR; INTRAVENOUS AS NEEDED
Status: DISCONTINUED | OUTPATIENT
Start: 2023-03-23 | End: 2023-03-23

## 2023-03-23 RX ORDER — FENTANYL CITRATE/PF 50 MCG/ML
50 SYRINGE (ML) INJECTION
Status: DISCONTINUED | OUTPATIENT
Start: 2023-03-23 | End: 2023-03-23 | Stop reason: HOSPADM

## 2023-03-23 RX ORDER — FENTANYL CITRATE/PF 50 MCG/ML
25 SYRINGE (ML) INJECTION
Status: DISCONTINUED | OUTPATIENT
Start: 2023-03-23 | End: 2023-03-23 | Stop reason: HOSPADM

## 2023-03-23 RX ORDER — ACETAMINOPHEN 325 MG/1
650 TABLET ORAL EVERY 6 HOURS PRN
Status: DISCONTINUED | OUTPATIENT
Start: 2023-03-23 | End: 2023-03-27 | Stop reason: HOSPADM

## 2023-03-23 RX ORDER — ASPIRIN 81 MG/1
81 TABLET ORAL DAILY
Status: DISCONTINUED | OUTPATIENT
Start: 2023-03-24 | End: 2023-03-27 | Stop reason: HOSPADM

## 2023-03-23 RX ORDER — ONDANSETRON 2 MG/ML
4 INJECTION INTRAMUSCULAR; INTRAVENOUS ONCE AS NEEDED
Status: DISCONTINUED | OUTPATIENT
Start: 2023-03-23 | End: 2023-03-23 | Stop reason: HOSPADM

## 2023-03-23 RX ORDER — HEPARIN SODIUM 1000 [USP'U]/ML
INJECTION, SOLUTION INTRAVENOUS; SUBCUTANEOUS AS NEEDED
Status: DISCONTINUED | OUTPATIENT
Start: 2023-03-23 | End: 2023-03-23

## 2023-03-23 RX ORDER — ACETAMINOPHEN 325 MG/1
650 TABLET ORAL EVERY 6 HOURS PRN
Status: DISCONTINUED | OUTPATIENT
Start: 2023-03-23 | End: 2023-03-23 | Stop reason: SDUPTHER

## 2023-03-23 RX ORDER — EPHEDRINE SULFATE 50 MG/ML
INJECTION INTRAVENOUS AS NEEDED
Status: DISCONTINUED | OUTPATIENT
Start: 2023-03-23 | End: 2023-03-23

## 2023-03-23 RX ORDER — METOPROLOL SUCCINATE 25 MG/1
12.5 TABLET, EXTENDED RELEASE ORAL DAILY
Status: DISCONTINUED | OUTPATIENT
Start: 2023-03-24 | End: 2023-03-27 | Stop reason: HOSPADM

## 2023-03-23 RX ORDER — HEPARIN SODIUM 5000 [USP'U]/ML
5000 INJECTION, SOLUTION INTRAVENOUS; SUBCUTANEOUS EVERY 8 HOURS SCHEDULED
Status: DISCONTINUED | OUTPATIENT
Start: 2023-03-24 | End: 2023-03-27 | Stop reason: HOSPADM

## 2023-03-23 RX ORDER — OXYCODONE HYDROCHLORIDE 10 MG/1
10 TABLET ORAL EVERY 4 HOURS PRN
Status: DISCONTINUED | OUTPATIENT
Start: 2023-03-23 | End: 2023-03-27 | Stop reason: HOSPADM

## 2023-03-23 RX ORDER — PANTOPRAZOLE SODIUM 40 MG/1
40 TABLET, DELAYED RELEASE ORAL DAILY
Status: DISCONTINUED | OUTPATIENT
Start: 2023-03-23 | End: 2023-03-27 | Stop reason: HOSPADM

## 2023-03-23 RX ORDER — CLOPIDOGREL BISULFATE 75 MG/1
75 TABLET ORAL DAILY
Status: DISCONTINUED | OUTPATIENT
Start: 2023-03-24 | End: 2023-03-27 | Stop reason: HOSPADM

## 2023-03-23 RX ORDER — HYDROMORPHONE HCL/PF 1 MG/ML
0.2 SYRINGE (ML) INJECTION
Status: DISCONTINUED | OUTPATIENT
Start: 2023-03-23 | End: 2023-03-23

## 2023-03-23 RX ORDER — ROCURONIUM BROMIDE 10 MG/ML
INJECTION, SOLUTION INTRAVENOUS AS NEEDED
Status: DISCONTINUED | OUTPATIENT
Start: 2023-03-23 | End: 2023-03-23

## 2023-03-23 RX ORDER — HEPARIN SODIUM 5000 [USP'U]/ML
5000 INJECTION, SOLUTION INTRAVENOUS; SUBCUTANEOUS EVERY 8 HOURS SCHEDULED
Status: DISCONTINUED | OUTPATIENT
Start: 2023-03-23 | End: 2023-03-23 | Stop reason: SDUPTHER

## 2023-03-23 RX ORDER — SODIUM CHLORIDE 9 MG/ML
100 INJECTION, SOLUTION INTRAVENOUS CONTINUOUS
Status: DISCONTINUED | OUTPATIENT
Start: 2023-03-23 | End: 2023-03-24

## 2023-03-23 RX ORDER — ONDANSETRON 2 MG/ML
4 INJECTION INTRAMUSCULAR; INTRAVENOUS EVERY 6 HOURS PRN
Status: DISCONTINUED | OUTPATIENT
Start: 2023-03-23 | End: 2023-03-27 | Stop reason: HOSPADM

## 2023-03-23 RX ORDER — MEPERIDINE HYDROCHLORIDE 25 MG/ML
12.5 INJECTION INTRAMUSCULAR; INTRAVENOUS; SUBCUTANEOUS ONCE AS NEEDED
Status: DISCONTINUED | OUTPATIENT
Start: 2023-03-23 | End: 2023-03-23 | Stop reason: HOSPADM

## 2023-03-23 RX ORDER — ALBUMIN, HUMAN INJ 5% 5 %
SOLUTION INTRAVENOUS CONTINUOUS PRN
Status: DISCONTINUED | OUTPATIENT
Start: 2023-03-23 | End: 2023-03-23

## 2023-03-23 RX ORDER — OXYCODONE HYDROCHLORIDE 5 MG/1
5 TABLET ORAL EVERY 4 HOURS PRN
Status: DISCONTINUED | OUTPATIENT
Start: 2023-03-23 | End: 2023-03-27 | Stop reason: HOSPADM

## 2023-03-23 RX ORDER — ONDANSETRON 2 MG/ML
4 INJECTION INTRAMUSCULAR; INTRAVENOUS EVERY 6 HOURS PRN
Status: DISCONTINUED | OUTPATIENT
Start: 2023-03-23 | End: 2023-03-23 | Stop reason: SDUPTHER

## 2023-03-23 RX ORDER — AMLODIPINE BESYLATE 10 MG/1
10 TABLET ORAL DAILY
Status: DISCONTINUED | OUTPATIENT
Start: 2023-03-24 | End: 2023-03-27 | Stop reason: HOSPADM

## 2023-03-23 RX ORDER — ONDANSETRON 2 MG/ML
INJECTION INTRAMUSCULAR; INTRAVENOUS AS NEEDED
Status: DISCONTINUED | OUTPATIENT
Start: 2023-03-23 | End: 2023-03-23

## 2023-03-23 RX ORDER — PROMETHAZINE HYDROCHLORIDE 25 MG/ML
6.25 INJECTION, SOLUTION INTRAMUSCULAR; INTRAVENOUS ONCE AS NEEDED
Status: DISCONTINUED | OUTPATIENT
Start: 2023-03-23 | End: 2023-03-23 | Stop reason: HOSPADM

## 2023-03-23 RX ORDER — IODIXANOL 320 MG/ML
INJECTION, SOLUTION INTRAVASCULAR AS NEEDED
Status: DISCONTINUED | OUTPATIENT
Start: 2023-03-23 | End: 2023-03-23 | Stop reason: HOSPADM

## 2023-03-23 RX ORDER — FENTANYL CITRATE 50 UG/ML
INJECTION, SOLUTION INTRAMUSCULAR; INTRAVENOUS AS NEEDED
Status: DISCONTINUED | OUTPATIENT
Start: 2023-03-23 | End: 2023-03-23

## 2023-03-23 RX ORDER — PROPOFOL 10 MG/ML
INJECTION, EMULSION INTRAVENOUS AS NEEDED
Status: DISCONTINUED | OUTPATIENT
Start: 2023-03-23 | End: 2023-03-23

## 2023-03-23 RX ADMIN — ATORVASTATIN CALCIUM 80 MG: 80 TABLET, FILM COATED ORAL at 15:53

## 2023-03-23 RX ADMIN — SODIUM CHLORIDE 100 ML/HR: 0.9 INJECTION, SOLUTION INTRAVENOUS at 14:10

## 2023-03-23 RX ADMIN — EPHEDRINE SULFATE 5 MG: 50 INJECTION, SOLUTION INTRAVENOUS at 09:36

## 2023-03-23 RX ADMIN — EPHEDRINE SULFATE 10 MG: 50 INJECTION, SOLUTION INTRAVENOUS at 10:30

## 2023-03-23 RX ADMIN — EPHEDRINE SULFATE 10 MG: 50 INJECTION, SOLUTION INTRAVENOUS at 12:27

## 2023-03-23 RX ADMIN — PANCRELIPASE 24000 UNITS: 24000; 76000; 120000 CAPSULE, DELAYED RELEASE PELLETS ORAL at 15:54

## 2023-03-23 RX ADMIN — HEPARIN SODIUM 1000 UNITS: 1000 INJECTION INTRAVENOUS; SUBCUTANEOUS at 11:23

## 2023-03-23 RX ADMIN — SUGAMMADEX 200 MG: 100 INJECTION, SOLUTION INTRAVENOUS at 12:43

## 2023-03-23 RX ADMIN — SODIUM CHLORIDE 100 ML/HR: 0.9 INJECTION, SOLUTION INTRAVENOUS at 15:17

## 2023-03-23 RX ADMIN — ALBUMIN HUMAN: 0.05 INJECTION, SOLUTION INTRAVENOUS at 10:40

## 2023-03-23 RX ADMIN — HEPARIN SODIUM 5000 UNITS: 1000 INJECTION INTRAVENOUS; SUBCUTANEOUS at 09:36

## 2023-03-23 RX ADMIN — FENTANYL CITRATE 50 MCG: 50 INJECTION, SOLUTION INTRAMUSCULAR; INTRAVENOUS at 12:04

## 2023-03-23 RX ADMIN — FENTANYL CITRATE 50 MCG: 50 INJECTION INTRAMUSCULAR; INTRAVENOUS at 14:16

## 2023-03-23 RX ADMIN — FENTANYL CITRATE 50 MCG: 50 INJECTION, SOLUTION INTRAMUSCULAR; INTRAVENOUS at 09:01

## 2023-03-23 RX ADMIN — ONDANSETRON 4 MG: 2 INJECTION INTRAMUSCULAR; INTRAVENOUS at 12:14

## 2023-03-23 RX ADMIN — CEFAZOLIN 1000 MG: 1 INJECTION, POWDER, FOR SOLUTION INTRAMUSCULAR; INTRAVENOUS at 12:18

## 2023-03-23 RX ADMIN — PANTOPRAZOLE SODIUM 40 MG: 40 TABLET, DELAYED RELEASE ORAL at 15:17

## 2023-03-23 RX ADMIN — SODIUM CHLORIDE 125 ML/HR: 0.9 INJECTION, SOLUTION INTRAVENOUS at 06:25

## 2023-03-23 RX ADMIN — PROTAMINE SULFATE 40 MG: 10 INJECTION, SOLUTION INTRAVENOUS at 12:14

## 2023-03-23 RX ADMIN — SODIUM CHLORIDE: 0.9 INJECTION, SOLUTION INTRAVENOUS at 10:23

## 2023-03-23 RX ADMIN — FENTANYL CITRATE 50 MCG: 50 INJECTION, SOLUTION INTRAMUSCULAR; INTRAVENOUS at 11:16

## 2023-03-23 RX ADMIN — INSULIN LISPRO 2 UNITS: 100 INJECTION, SOLUTION INTRAVENOUS; SUBCUTANEOUS at 17:41

## 2023-03-23 RX ADMIN — HEPARIN SODIUM 2000 UNITS: 1000 INJECTION INTRAVENOUS; SUBCUTANEOUS at 09:48

## 2023-03-23 RX ADMIN — ROCURONIUM BROMIDE 50 MG: 10 INJECTION, SOLUTION INTRAVENOUS at 08:17

## 2023-03-23 RX ADMIN — PHENYLEPHRINE HYDROCHLORIDE 25 MCG/MIN: 10 INJECTION INTRAVENOUS at 08:27

## 2023-03-23 RX ADMIN — SODIUM CHLORIDE, SODIUM LACTATE, POTASSIUM CHLORIDE, AND CALCIUM CHLORIDE: .6; .31; .03; .02 INJECTION, SOLUTION INTRAVENOUS at 10:23

## 2023-03-23 RX ADMIN — FENTANYL CITRATE 50 MCG: 50 INJECTION, SOLUTION INTRAMUSCULAR; INTRAVENOUS at 08:14

## 2023-03-23 RX ADMIN — PROPOFOL 120 MG: 10 INJECTION, EMULSION INTRAVENOUS at 08:17

## 2023-03-23 RX ADMIN — CEFAZOLIN 2000 MG: 1 INJECTION, POWDER, FOR SOLUTION INTRAMUSCULAR; INTRAVENOUS at 08:29

## 2023-03-23 NOTE — PLAN OF CARE
Problem: MOBILITY - ADULT  Goal: Maintain or return to baseline ADL function  Description: INTERVENTIONS:  -  Assess patient's ability to carry out ADLs; assess patient's baseline for ADL function and identify physical deficits which impact ability to perform ADLs (bathing, care of mouth/teeth, toileting, grooming, dressing, etc )  - Assess/evaluate cause of self-care deficits   - Assess range of motion  - Assess patient's mobility; develop plan if impaired  - Assess patient's need for assistive devices and provide as appropriate  - Encourage maximum independence but intervene and supervise when necessary  - Involve family in performance of ADLs  - Assess for home care needs following discharge   - Consider OT consult to assist with ADL evaluation and planning for discharge  - Provide patient education as appropriate  Outcome: Progressing  Goal: Maintains/Returns to pre admission functional level  Description: INTERVENTIONS:  - Perform BMAT or MOVE assessment daily    - Set and communicate daily mobility goal to care team and patient/family/caregiver     - Collaborate with rehabilitation services on mobility goals if consulted  - Out of bed for toileting  - Record patient progress and toleration of activity level   Outcome: Progressing     Problem: PAIN - ADULT  Goal: Verbalizes/displays adequate comfort level or baseline comfort level  Description: Interventions:  - Encourage patient to monitor pain and request assistance  - Assess pain using appropriate pain scale  - Administer analgesics based on type and severity of pain and evaluate response  - Implement non-pharmacological measures as appropriate and evaluate response  - Consider cultural and social influences on pain and pain management  - Notify physician/advanced practitioner if interventions unsuccessful or patient reports new pain  Outcome: Progressing     Problem: INFECTION - ADULT  Goal: Absence or prevention of progression during hospitalization  Description: INTERVENTIONS:  - Assess and monitor for signs and symptoms of infection  - Monitor lab/diagnostic results  - Monitor all insertion sites, i e  indwelling lines, tubes, and drains  - Monitor endotracheal if appropriate and nasal secretions for changes in amount and color  - [place] appropriate cooling/warming therapies per order  - Administer medications as ordered  - Instruct and encourage patient and family to use good hand hygiene technique  - Identify and instruct in appropriate isolation precautions for identified infection/condition  Outcome: Progressing  Goal: Absence of fever/infection during neutropenic period  Description: INTERVENTIONS:  - Monitor WBC    Outcome: Progressing     Problem: SAFETY ADULT  Goal: Maintain or return to baseline ADL function  Description: INTERVENTIONS:  -  Assess patient's ability to carry out ADLs; assess patient's baseline for ADL function and identify physical deficits which impact ability to perform ADLs (bathing, care of mouth/teeth, toileting, grooming, dressing, etc )  - Assess/evaluate cause of self-care deficits   - Assess range of motion  - Assess patient's mobility; develop plan if impaired  - Assess patient's need for assistive devices and provide as appropriate  - Encourage maximum independence but intervene and supervise when necessary  - Involve family in performance of ADLs  - Assess for home care needs following discharge   - Consider OT consult to assist with ADL evaluation and planning for discharge  - Provide patient education as appropriate  Outcome: Progressing  Goal: Maintains/Returns to pre admission functional level  Description: INTERVENTIONS:  - Perform BMAT or MOVE assessment daily    - Set and communicate daily mobility goal to care team and patient/family/caregiver     - Collaborate with rehabilitation services on mobility goals if consulted  - Out of bed for toileting  - Record patient progress and toleration of activity level   Outcome: Progressing  Goal: Patient will remain free of falls  Description: INTERVENTIONS:  - Educate patient/family on patient safety including physical limitations  - Instruct patient to call for assistance with activity   - Consult OT/PT to assist with strengthening/mobility   - Keep Call bell within reach  - Keep bed low and locked with side rails adjusted as appropriate  - Keep care items and personal belongings within reach  - Initiate and maintain comfort rounds  - Make Fall Risk Sign visible to staff  - Offer Toileting in advance of need  - Initiate/Maintain bed alarm  - Obtain necessary fall risk management equipment  - Apply yellow socks and bracelet for high fall risk patients  - Consider moving patient to room near nurses station  Outcome: Progressing     Problem: Knowledge Deficit  Goal: Patient/family/caregiver demonstrates understanding of disease process, treatment plan, medications, and discharge instructions  Description: Complete learning assessment and assess knowledge base    Interventions:  - Provide teaching at level of understanding  - Provide teaching via preferred learning methods  Outcome: Progressing     Goal: Incision(s), wounds(s) or drain site(s) healing without S/S of infection  Description: INTERVENTIONS  - Assess and document dressing, incision, wound bed, drain sites and surrounding tissue  - Provide patient and family education  - Perform skin care/dressing changes Outcome: Progressing  Goal: Pressure injury heals and does not worsen  Description: Interventions:  - Implement low air loss mattress or specialty surface (Criteria met)  - Apply silicone foam dressing  - Consider nutrition services referral as needed  Outcome: Progressing     Problem: HEMATOLOGIC - ADULT  Goal: Maintains hematologic stability  Description: INTERVENTIONS  - Assess for signs and symptoms of bleeding or hemorrhage  - Monitor labs  - Administer supportive blood products/factors as ordered and appropriate  Outcome: Progressing

## 2023-03-23 NOTE — ASSESSMENT & PLAN NOTE
· Hx with new right foot ischemic wounds lateral foot and heel    · Today s/p  Right femoral artery endarterectomy and distal SFA popliteal  · Plan:  · Neuro exam per protocol  · Blood pressure goal -170  · Asa 81mg + Plavix 75 mg as per vascular surgery  · Continue Lipitor 80 mg once a day

## 2023-03-23 NOTE — OP NOTE
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 ([address] ) [I70 234]    Procedure(s):  Right - Common femoral endarterectomy&antegrade intervention of SFA  popliteal artery w/ shockwave intravascular lithotripsy with 5 x 60 and 6 x 60 mm balloon  Balloon angioplasty of the anterior tibial artery with 3 mm balloon  Specimen(s):  * No specimens in log *    Estimated Blood Loss:   1000 mL    Drains:  Urethral Catheter Coude 16 Fr  (Active)   Reasons to continue Urinary Catheter  Accurate I&O assessment in critically ill patients (48 hr  max); Post-operative urological requirements 23 1310   Goal for Removal Remove POD#1 23 1310   Site Assessment Clean;Skin intact 23 1310   [place] Care Done 23 1310   Collection Container Standard drainage bag 23 1310   Securement Method Securing device (Describe) 23 1310   Output (mL) 50 mL 23 1414   Number of days: 0       Anesthesia Type:   General    Operative Indications: 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 (HCC) I70 234      Operative Findings:  Heavily calcified occluded profunda femoris artery  There was some mixed density thrombus in the proximal superficial femoral artery that was removed with embolectomy      Angiographic findings and radiographic interpretation of the test:  After endarterectomy the right common femoral artery is widely patent  The profunda femoris artery is occluded chronically    There is a circumflex branch of the profunda femoris artery that has significant plaque at its origin but is patent  The superficial femoral artery has near occlusive scattered stenosis secondary to heavily calcified diffuse plaque  The popliteal artery also has near occlusive scattered stenosis due to heavily calcified plaque  There is a large geniculate artery that is arising from the P1 segment of the popliteal artery that reconstitutes the peroneal artery  The P2 and the P3 segment of the popliteal artery are small caliber due to underfilling  There is a high-grade stenosis of the proximal portion of the anterior tibial artery due to calcified focal plaque  There is a high-grade stenosis of the origin of the TP trunk due to heavily calcified plaque  The TP trunk is stenotic  The peroneal artery is patent throughout its course  The anterior tibial artery is the dominant runoff into the foot  There is significant disease in the plantar arch due to underlying risk factors of diabetes  The dorsalis pedis artery is chronically occluded with collaterals filling the remnants of the plantar arch  Following balloon angioplasty and intravascular lithotripsy there is excellent resolution of the near occlusive calcified stenosis with excellent runoff via the anterior tibial artery  Complications:   None    Procedure and Technique:  The patient was brought to the operating room and placed on the operating table in the supine position  The patient was identified by verbal confirmation and armband identification  Ancef was given of iv antibiotic  The patient was prepped and draped in usual sterile fashion with ioban and a formal timeout was called  A longitudinal incision was made in a skin fold overlying the previously marked right femoral artery in the groin  Dissection was carried through the subcutaneous tissue and fascia to reach the femoral sheath  The inguinal ligament was retracted cephalad    The femoral sheath was incised to expose the femoral artery  There was significant scar tissue due to prior access for aortic valve replacement and ProGlide use  We dissected proximal and distal and all individual side branches were controlled with vessel loops  Dissection was carried out cephalad towards the external iliac artery under the inguinal ligament  There was posterior plaque extending all the way up  However the anterior wall was soft to be clamped  The profunda femoris was dissected and encircled with loops  It was circumferentially calcified with no soft spot for clamping hence further dissection was carried out to the branches of the profunda femoris artery which were also chronically occluded due to heavily calcified plaque  The SFA was densely calcified  We extended the incision distally trying to find relatively soft SFA but it was all calcified distally as well  Patient was given 6000 units of iv heparin  An additional 3000 units were administered later on in the case  Vascular clamps were applied for control  A longitudinal arteriotomy was made over the CFA using 11 blade and extended with Pott's scissors  Distally we extended for about 5 cm on the SFA  The plaque was endarterectomized using Crowley elevators  Proximally the plaque was transected     Distally at the SFA, we performed an eversion endarterectomy to achieve a relatively clean endpoint  The profunda femoris has minimal intimal plaque that was gently teased out to smooth endpoint  There was some thrombotic plaque in the proximal superficial femoral artery that was teased out with forceps and #3 [place] balloon was also passed up to the mid SFA and pulled back  Now there was return of slow backbleeding from the SFA  4 mg of tPA was then instilled into the superficial femoral artery to lyse any possible clot  There was minimal backbleeding from the profunda  There was excellent inflow  Endarterectomy was performed    The endartectomized surface was throughly flushed with heparinized saline and all debris were meticulously removed  A bovine pericardial patch was then obtained  It was sutured using standard 4-quadrant technique using 5-0 prolene sutures  When the suture line was nearly completed the vessels were flushed vigorously in both directions  The suture line was tied down and flow was restored first in the profunda and then the SFA  Suture line bleeding was controlled with additional sutures with 6-0 prolene as necessary  Continue be considered but suture line bleeding due to lack of outflow  The patch was punctured antegrade, 7 [place] sheath was inserted into the superficial femoral artery  Right lower extremity angiogram was performed, findings of which are dictated above  Using a combination of Surfside catheter and 014 glide advantage and V14 wire we were able to cross all the lesions and enter into the peroneal artery  Balloon angioplasty was then first performed with 3 mm x 220 mm coyote balloon followed by 4 mm x 100 mm coyote balloon for predilatation  Following which shockwave intravascular lithotripsy was performed using 6 mm x 60 mm balloon for the superficial femoral artery and 5 mm x 60 mm balloon for the P1 and P2 segments of the popliteal artery with excellent results  Using Glidewire advantage we then selected the anterior tibial artery and balloon angioplasty of the proximal anterior tibial artery was performed using 3 mm balloon with excellent results  Sheath and wire was then removed  Additional sutures were performed to secure hemostasis  50 mg protamine was administered  Floseal, fibrillar and Gelfoam thrombin were used to achieve secure hemostasis  Using 2-0 Monocryl the deeper layers were closed followed by 3-0 and 4-0 Monocryl for the skin and subcutaneous tissues  Surgical glue was applied  A Mepilex bandage was applied  At end of case instrument, sponge and needle counts were found to be correct      The patient awoke and had excellent triphasic anterior tibial signals on right foot  The patient was transferred to recovery in stable condition       I was present for the entire procedure, A qualified resident physician was not available and A physician assistant was required during the procedure for retraction tissue handling,dissection and suturing of femoral patch    Patient Disposition:  PACU , Critical Care Unit and guarded condition        SIGNATURE: Chase Rider MD  DATE: March 23, 2023  TIME: 3:22 PM      Vascular Quality Initiative - Peripheral Vascular Intervention     Urgency: Urgent    Functional Status:  Ambulatory and capable of all self care but unable to carry out any work activities  Up and about more than 50% of waking hours  Ambulation: Amb = independently ambulatory    Leg Symptoms    Right: Ulcer/necrosis (gangrene): de rufina tissue loss due to peripheral arterial disease, not due to non-healing prior amputation       Tissue Loss Severity: Grade 1, Shallow = small shallow ulcer(s) on distal leg or foot, any exposed bone is only limited to distal phalanx (ie, minor tissue loss: limb salvage possible with simple digital amputation [1 or 2 digits], or skin coverage)  Infection: Grade 0, None = No symptoms or signs of infection  Left: Mild Claudication:  ischemic limb muscle pain that does not limit walking or limits walking only after > 2 blocks (>600 feet or 2 football fields)    COVID Information  COVID Symptoms Pre-Procedure: Asymptomatic    Treatment Delayed by Pandemic: None    Access   Number of Sites: 1     Access Site 1:     Side 1: Right    Site 1: Femoral Antegrade    Access Guidance 1:Concomitant Endarterectomy  Yes, Patch Closure    Largest Sheath Size 1: 7 Fr      Closure Device 1: None   None    Procedure  Fluoro Time: 18 15 minutes  Contrast Volume: Visipaque 70 ml  DAP: 10 79 Gy cm2  CO2: no  Anticoagulant: Heparin  Protamine: Yes  If Creatinine is > 1 2 or missing, FELIPE Prophylaxis IV saline     Treatment Details  Indication: Occlusive Disease,    Completion Assessment  Artery 1 treated: SFA+Pop   Right               Outflow: AT,PT,Peroneal: 2                       Segments treated: P1+P2                      Was this Site previously treated?: Yes, PTA          TASC Grade: C          Total Treated Length: 30 cm          Total Occluded Length: 0 cm          Calcification: Severe (calcification on both sides of artery > half length of lesion)          Number of Treatment types (Devices):    2           Device 1          Treatment Type: Plain Balloon         Device 2          Treatment Type: Special Balloon,  Lithoplasty Balloon                Diameter: 5,6 mm          Length: 60 mm            Concomitant: None          Technical result: Successful (stenosis <=30%)      Artery 2 treated: Ant Tibial   Right                    Was this Site previously treated?: Yes, PTA          TASC Grade: A          Total Treated Length: 3 cm           Total Occluded Length: 0 cm          Calcification: Focal (calcification on one side of artery < half length of lesion)          Number of Treatment types (Devices):   1           Device 1          Treatment Type: Plain Balloon            Concomitant: None          Technical result: Successful (stenosis <=30%)          None       Post Procedure  Patient currently taking: Statin, Yes      Antiplatelet Medication, Yes    Procedure Complications: No

## 2023-03-23 NOTE — ASSESSMENT & PLAN NOTE
· History of CAD, status post CABG (2013), bilateral carotid stenosis (>70)  · Status post right femoral artery endarterectomy and distal SFA popliteal  · Plan  · Continue aspirin plus Plavix plus statin

## 2023-03-23 NOTE — ASSESSMENT & PLAN NOTE
Lab Results   Component Value Date    EGFR 29 03/23/2023    EGFR 31 03/06/2023    EGFR 32 02/27/2023    CREATININE 2 07 (H) 03/23/2023    CREATININE 1 95 (H) 03/06/2023    CREATININE 1 93 (H) 02/27/2023   History of hypertension CKD stage IV  Home meds [id number] mg once a day, lisinopril 10 mg once a day, patient has been prescribed torsemide 20 mg twice daily however endorse takes torsemide 10 mg once a day    Continue holding lisinopril and torsemide as per protocol  Continue Norvasc 10 mg once a day and Toprol XL 12 5  Most likely resume lisinopril and torsemide tomorrow

## 2023-03-23 NOTE — H&P
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 (HCC)  Multiple issues CKD4, CHF EF 22,   20-year-old male with HTN, HLD, DM, CAD, CABG, AS, bilateral asymptomatic carotid stenosis, PAD status post left endarterectomy 2015, left fem to anterior tibial bypass by Dr Chris Hale '15, s/p  Angioplasty to bypass graft stenoses '16, h/o Right SFA, popliteal and AT angioplasty in 2018      Prior a gram reviewed, extensive calcified plaque in right SFA, popliteal and tibials      Now has new right foot ischemic wounds in lateral foot and heel  He has restenosis of distal SFA popliteal   Will need to be treated to prevent limb loss  Will get clearance for hydration protocol per nephrologist           He is supposed to have Egd for Batista's surveillance next week and plavix is held  I will ask to cancel the EGD and restart plavix           A gram did not work in crossing lesions due to dense calcium in proximal SFA  Will require RIGHT femoral endarterectomy, angioplasty with Right SFA/popliteal artery, possible shockwave, possible atherectomy, stent  Risks of kidney failure, groin hematoma, limb loss were discussed  We will minimize contrast and use iv hydration and CO2  Risks of groin infection and limb loss were discussed      Benign hypertension with chronic kidney disease, stage IV [facility])        Lab Results   Component Value Date     EGFR 21 02/10/2023     EGFR 28 12/07/2022     EGFR 29 11/08/2022     CREATININE 2 71 (H) 02/10/2023     CREATININE 2 13 (H) 12/07/2022     CREATININE 2 08 (H) 11/08/2022      Will need iv hydration pre and post hydration    Due to CHF will need to discuss with Dr Evert Wylie and Robert Escobar          Diagnoses and all orders for this visit:     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 ([address] )  -      Benign hypertension with chronic kidney disease, stage IV (HCC)          Subjective:     "Patient ID: Juan Antonio Renee  is a 76 y o  male      Patient is here to have an evaluation of a non-healing Rt foot wound  Pt states that the wound started in November HPI  Right foot lateral foot wound is very painful  Heel is also painful  He is seeing Dr Huertas Coil  There are small wounds that are now appearing  He is getting electric shock pan  He takes aspirin and plavix  Smokes 1/4 PPD  A-gram done 2 weeks ago failed to treat the right leg blockages with stent        The following portions of the patient's history were reviewed and updated as appropriate: allergies, current medications, past family history, past medical history, past social history, past surgical history and problem list      Review of Systems   Musculoskeletal: Positive for gait problem  Skin: Positive for color change and wound (Rt foot)  I have reviewed the ROS as entered and made changes as necessary         Objective:      BP (!) 184/77   Pulse 59   Temp 98 °F (36 7 °C) (Temporal)   Resp 20   Ht 5' 10\" (1 778 m)   Wt 78 1 kg (172 lb 2 9 oz)   SpO2 96%   BMI 24 71 kg/m²               Physical Exam  Vitals and nursing note reviewed  Constitutional:       Appearance: Normal appearance  HENT:      Head: Normocephalic and atraumatic  Cardiovascular:      Rate and Rhythm: Normal rate and regular rhythm  Heart sounds: Murmur heard  Comments: trphasic Left Dp signal  Monophasic right DP signal    2+ left Fem AT bypass pulse  Pulmonary:      Breath sounds: Wheezing present  Abdominal:      Palpations: Abdomen is soft  Musculoskeletal:      Right lower leg: No edema  Left lower leg: No edema  Skin:     General: Skin is warm and dry  Capillary Refill: Capillary refill takes less than 2 seconds  Comments: Small ischemic ulcers in right lateral foot and heel  Dependant rubor of right foot  Neurological:      General: No focal deficit present        Mental Status: He is alert and " oriented to person, place, and time     Psychiatric:         Mood and Affect: Mood normal          Behavior: Behavior normal

## 2023-03-23 NOTE — ANESTHESIA PREPROCEDURE EVALUATION
Procedure:  Common femoral endarterectomy&antegrade intervention of SFA, popliteal artery w/ shockwave (Right: Leg Lower)    Relevant Problems   CARDIO   (+) CAD (coronary artery disease)   (+) Hyperlipidemia   (+) Hypertension   (+) Progressive angina (HCC)   (+) S/P CABG (coronary artery bypass graft)   (+) S/P TAVR (transcatheter aortic valve replacement)   (+) Sinus bradycardia      ENDO   (+) Controlled type 2 diabetes mellitus with stage 4 chronic kidney disease, without long-term current use of insulin (HCC)   (+) Secondary hyperparathyroidism of renal origin (HCC)      GI/HEPATIC   (+) GERD (gastroesophageal reflux disease)      /RENAL   (+) Acute kidney injury superimposed on chronic kidney disease (HCC)   (+) Benign hypertension with chronic kidney disease, stage III (HCC)   (+) Benign hypertension with chronic kidney disease, stage IV (HCC)   (+) Renal cyst, left      HEMATOLOGY   (+) Anemia due to stage 4 chronic kidney disease (HCC)   (+) Iron deficiency anemia      NEURO/PSYCH   (+) Atherosclerosis of native arteries of extremities with intermittent claudication, bilateral legs (HCC)   (+) Progressive angina (HCC)   (+) Tension headache        Physical Exam    Airway    Mallampati score: I         Dental       Cardiovascular  Rhythm: regular, Rate: normal,     Pulmonary  Pulmonary exam normal     Other Findings        Anesthesia Plan  ASA Score- 4     Anesthesia Type- general with ASA Monitors  Additional Monitors: arterial line  Airway Plan: ETT  Plan Factors-Exercise tolerance (METS): >4 METS  Chart reviewed  Existing labs reviewed  Patient is not a current smoker  Induction- intravenous  Postoperative Plan-     Informed Consent- Anesthetic plan and risks discussed with patient

## 2023-03-23 NOTE — CONSULTS
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 ([address] )  Assessment & Plan  · Hx with new right foot ischemic wounds lateral foot and heel  · Today s/p  Right femoral artery endarterectomy and distal SFA popliteal  · Plan:  · Neuro exam per protocol  · Blood pressure goal -170  · Asa 81mg + Plavix 75 mg as per vascular surgery  · Continue Lipitor 80 mg once a day    CAD (coronary artery disease)  Assessment & Plan  · History of CAD, status post CABG (2013), bilateral carotid stenosis (>70)  · Status post right femoral artery endarterectomy and distal SFA popliteal  · Plan  · Continue aspirin plus Plavix plus statin      Hyperlipidemia  Assessment & Plan  · History hyperlipidemia  · Continue Lipitor 80 mg once a day    Sinus bradycardia  Assessment & Plan  · History of sinus bradycardia  · Home meds Toprol XL 12 5 mg once a day  · Asymptomatic  · Home meds restarted by Vascular  · Continue monitoring heart rate, symptoms if bradycardia and symptomatic will consider and to hold home meds  Anemia due to stage 4 chronic kidney disease (HCC)  Assessment & Plan  · History anemia due to CKD stage IV  · Baseline hemoglobin over the past year seems to be close to 10 0-11 5  · Follows Dr Ismael Granados on the outpatient setting  Last visit January 2023 recommended to follow-up with hematology in the outpatient setting as well as continue with iron supplementations  · Inguinal site no signs of hematoma, patient denies any black tarry stools or any other changes in his stools  · Plan  · CBC a m    · Patient follow-up with hematology on the outpatient setting    Benign hypertension with chronic kidney disease, stage IV [facility])  Assessment & Plan  Lab Results   Component Value Date    EGFR 29 03/23/2023    EGFR 31 03/06/2023    EGFR 32 02/27/2023    CREATININE 2 07 (H) 03/23/2023    CREATININE 1 95 (H) 03/06/2023    CREATININE 1 93 (H) 02/27/2023   History of hypertension CKD stage IV  Home meds [id number] mg once a day, lisinopril 10 mg once a day, patient has been prescribed torsemide 20 mg twice daily however endorse takes torsemide 10 mg once a day    Continue holding lisinopril and torsemide as per protocol  Continue Norvasc 10 mg once a day and Toprol XL 12 5  Most likely resume lisinopril and torsemide tomorrow    GERD (gastroesophageal reflux disease)  Assessment & Plan  · History of GERD  · Continue home meds Protonix 40 mg twice daily    Hyperuricemia  Assessment & Plan  · History hyperuricemia follows Dr Doreen Peña  · Currently on allopurinol 300 mg once a day  · Last uric acid 01/2023 5 2 stable  · No acute flair  · Continue home meds    Controlled type 2 diabetes mellitus with stage 4 chronic kidney disease, without long-term current use of insulin [facility])  Assessment & Plan  Lab Results   Component Value Date    HGBA1C 7 6 (H) 02/10/2023       Recent Labs     03/23/23  0637 03/23/23  1323 03/23/23  1528   POCGLU 165* 174* 186*       Blood Sugar Average: Last 72 hrs:  (P) 175     · Previously on metformin however has been held by PCP due to decreased creatinine clearance  · Sliding scale  · Cardiac carbohydrate controlled diet           History of Present Illness      HPI: Silvia Gupta is a [de-identified] y o  male with HFpEF (EF 65%), HTN, CKD Stage 4, Type 2 DM, Hyperuricemia, HLD, sinus bradycardia  GERD   S/p TAVR (2022), S/P CABG (2408), PAD with complication who presents after was found with right lower extremity ulcerations of midfoot currently status post right femoral endarterectomy and distal SFA popliteal  Denies any fever, chills, dyspnea, chest pain, abdominal pain as well as no urinary symptoms  Denies any pain or discomfort around incisional site  History obtained from chart review and the patient  Review of Systems   Constitutional: Negative for chills and fever  HENT: Positive for sore throat  Negative for ear pain  Eyes: Negative for pain and visual disturbance  Respiratory: Negative for cough and shortness of breath  Cardiovascular: Negative for chest pain, palpitations and leg swelling  Gastrointestinal: Negative for abdominal pain and vomiting  Genitourinary: Negative for dysuria and hematuria  Musculoskeletal: Negative for arthralgias and back pain  Skin: Negative for color change and rash  Neurological: Negative for seizures and syncope  All other systems reviewed and are negative  Historical Information   Past Medical History:  09/10/2021: Atherosclerosis of autologous vein bypass graft(s) of   other extremity with ulceration ([facility] )  2/24/2023: Atherosclerosis of native artery of right lower extremity   with ulceration of midfoot ([facility])  No date: Basal cell carcinoma      Comment:  right cheek  No date: CAD (coronary artery disease)  No date: Carotid stenosis, asymptomatic, bilateral  No date: Chronic kidney disease  No date: Colon polyp  No date: Diabetes ([facility] )      Comment:  type 2, non-insulin dependent  No date: Diabetes mellitus ([facility])  No date: GERD (gastroesophageal reflux disease)  No date: History of nephrolithiasis  No date: Hyperlipidemia  No date: Hypertension  11/30/2022: Left foot pain  No date: PAD (peripheral artery disease) ([facility])  No date: Severe aortic stenosis  11/22/2022: Squamous cell skin cancer      Comment:  left superior helix Past Surgical History:  No date: APPENDECTOMY  05/05/2022: CARDIAC CATHETERIZATION; N/A      Comment:  Procedure: CARDIAC RHC/LHC; Surgeon: Mihir Bradshaw DO;  [facility] CARDIAC C[facility];   Service:                Cardiology  05/05/2022: CARDIAC CATHETERIZATION; N/A      Comment:  Procedure: Cardiac Coronary Angiogram;  Surgeon:                Fartun Guaman DO;  Location: [facility] CARDIAC CATH LAB; Service: Cardiology  05/24/2022: CARDIAC CATHETERIZATION; N/A      Comment:  Procedure: Cardiac pci;  Surgeon: Mario Goins MD;                 Location: [facility] CARDIAC CATH LAB; Service: Cardiology  06/14/2022: CARDIAC CATHETERIZATION; N/A      Comment:  Procedure: CARDIAC TAVR;  Surgeon: Christin Hernandez MD;                 Location: [facility] MAIN OR;  Service: Cardiology  No date: COLECTOMY  2013: COLONOSCOPY  2013: CORONARY ARTERY BYPASS GRAFT      Comment:  X 2  No date: FEMORAL ARTERY - POPLITEAL ARTERY BYPASS GRAFT  No date: HEMORRHOID SURGERY  11/19/2018: IR AORTAGRAM WITH RUN-OFF  3/2/2023: IR AORTAGRAM WITH RUN-OFF  01/11/2023: MOHS SURGERY;  Left      Comment:  SCC left superior helix-Dr Tovar  08/12/2016: [place] SLCTV CATHJ 3RD+ ORD SLCTV ABDL PEL/LXTR [facility]; Left      Comment:  Procedure: LEFT FEMORAL ARTERIOGRAM; BALLOON                ANGIOPLASTY; SFA  AND FEMORAL AT VEIN GRAFT;  Surgeon:                Gertrude Mesa MD;  Location: [facility] MAIN OR;  Service:                Vascular  06/14/2022: [place] TRANSCATHETER TRANSAPICAL REPLACEMT AORTIC VALVE; N/A      Comment:  Procedure: REPLACEMENT AORTIC VALVE TRANSCATHETER (TAVR)               TRANSAPICAL 29MM IRVIN KYLER S3 ULTRA VALVE(ACCESS ON                LEFT) ELANA;  Surgeon: Miri Mack DO;  Location: [facility]               MAIN OR;  Service: Cardiac Surgery  No date: SKIN CANCER EXCISION  No date: TONSILLECTOMY AND ADENOIDECTOMY   Current Outpatient Medications   Medication Instructions   • acetaminophen (TYLENOL) 975 mg, Oral, Every 8 hours   • allopurinol (ZYLOPRIM) 300 mg, Oral, Daily   • amLODIPine (NORVASC) 10 mg tablet TAKE 1 TABLET DAILY   • AMYLASE-LIPASE-PROTEASE PO Oral, 2 times daily before meals   • aspirin (ECOTRIN LOW STRENGTH) 81 mg, Oral, Daily   • atorvastatin (LIPITOR) 80 mg tablet TAKE 1 TABLET DAILY AT     BEDTIME   • calcitriol (ROCALTROL) 0 25 mcg, Oral, 3 times weekly   • clopidogrel (PLAVIX) 75 mg, Oral, Daily   • Cyanocobalamin (VITAMIN B12 PO) Oral, Weekly   • docusate sodium (COLACE) 100 mg, Oral, 2 times daily   • lisinopril (ZESTRIL) 10 mg, Oral, Daily   • Magnesium Oxide (MAG-200 PO) Oral, Weekly   • metoprolol succinate (TOPROL-XL) 12 5 mg, Oral, Daily   • pancrelipase, Lip-Prot-Amyl, (CREON) 24,000 units 24,000 units of lipase, Oral, 3 times daily with meals   • pantoprazole (PROTONIX) 40 mg tablet TAKE 1 TABLET DAILY   • potassium chloride (K-DUR,KLOR-CON) 10 mEq tablet 10 mEq, Oral, Daily   • torsemide (DEMADEX) 20 mg, Oral, 2 times daily   • vitamin C 250 mg, Oral, Daily    No Known Allergies   Social History     Tobacco Use   • Smoking status: Former     Packs/day: 1 00     Years: 50 00     Pack years: 50 00     Types: Cigarettes     Quit date: 3/3/2023     Years since quittin 0   • Smokeless tobacco: Never   Vaping Use   • Vaping Use: Never used   Substance Use Topics   • Alcohol use: Not Currently     Comment: rare   • Drug use: No    Family History   Problem Relation Age of Onset   • Heart attack Father    • Other Sister         bypass and vlave replacement   • Stroke Paternal Uncle    • Arrhythmia Neg Hx    • Asthma Neg Hx    • Clotting disorder Neg Hx    • Fainting Neg Hx    • Anuerysm Neg Hx    • Hypertension Neg Hx         unsure    • Hyperlipidemia Neg Hx    • Heart failure Neg Hx         Objective                            Vitals I/O      Most Recent Min/Max in 24hrs   Temp 98 °F (36 7 °C) (Simultaneous filing   User may not have seen previous data ) Temp  Min: 96 7 °F (35 9 °C)  Max: 98 °F (36 7 °C)   Pulse 62 Pulse  Min: 56  Max: 64   Resp 15 Resp  Min: 13  Max: 22   BP (!) 129/46 BP  Min: 110/60  Max: 184/77   O2 Sat 99 % SpO2  Min: 96 %  Max: 100 %      Intake/Output Summary (Last 24 hours) at 3/23/2023 6382  Last data filed at 3/23/2023 1600  Gross per 24 hour   Intake 4455 01 ml   Output 1390 ml   Net 3065 01 ml         Diet Cardiovascular; Sodium 2 GM; Consistent Carbohydrate Diet Level 2 (5 carb servings/75 grams CHO/meal)     Invasive Monitoring Physical exam   Arterial Line  Deyanira /38  Arterial Line BP  Min: 134/32  Max: 170/38   MAP 74 mmHg  Arterial Line MAP (mmHg)  Min: 60 mmHg  Max: 76 mmHg    Physical Exam  Vitals and nursing note reviewed  Constitutional:       General: He is not in acute distress  Appearance: He is well-developed  He is not ill-appearing  HENT:      Head: Normocephalic and atraumatic  Right Ear: External ear normal       Left Ear: External ear normal       Nose: Nose normal       Mouth/Throat:      Mouth: Mucous membranes are moist    Eyes:      General: No scleral icterus  Extraocular Movements: Extraocular movements intact  Conjunctiva/sclera: Conjunctivae normal       Pupils: Pupils are equal, round, and reactive to light  Cardiovascular:      Rate and Rhythm: Normal rate and regular rhythm  Pulses: Normal pulses  Heart sounds: Murmur heard  Comments: Holosystolic murmur  Pulmonary:      Effort: Pulmonary effort is normal  No respiratory distress  Breath sounds: Normal breath sounds  No wheezing  Chest:      Chest wall: No tenderness  Abdominal:      General: Bowel sounds are normal  There is no distension  Palpations: Abdomen is soft  Tenderness: There is no abdominal tenderness  There is no right CVA tenderness or left CVA tenderness  Musculoskeletal:         General: No swelling  Cervical back: Normal range of motion and neck supple  Right lower leg: No edema  Left lower leg: No edema  Feet:    Skin:     General: Skin is warm and dry  Capillary Refill: Capillary refill takes less than 2 seconds  Neurological:      Mental Status: He is alert     Psychiatric:         Mood and Affect: Mood normal           Diagnostic Studies EKG: No recent EKG  Imaging:  I have personally reviewed pertinent reports         Medications:  Scheduled PRN   [START ON 3/24/2023] allopurinol, 300 mg, Daily  [START ON 3/24/2023] amLODIPine, 10 mg, Daily  [START ON 3/24/2023] aspirin, 81 mg, Daily  atorvastatin, 80 mg, Daily With Dinner  [START ON 3/24/2023] clopidogrel, 75 mg, Daily  heparin 2000 units and papaverine 60 mg in isolyte equivalent 500 mL irrigation bag, , Once  [START ON 3/24/2023] heparin (porcine), 5,000 Units, Q8H [address]  insulin lispro, 1-6 Units, TID AC  [START ON 3/24/2023] metoprolol succinate, 12 5 mg, Daily  pancrelipase (Lip-Prot-Amyl), 24,000 Units, TID With Meals  pantoprazole, 40 mg, Daily      acetaminophen, 650 mg, Q6H PRN  lactated ringers, 500 mL, Once PRN   And  lactated ringers, 500 mL, Once PRN  morphine injection, 2 mg, Q3H PRN  ondansetron, 4 mg, Q6H PRN  oxyCODONE, 10 mg, Q4H PRN  oxyCODONE, 5 mg, Q4H PRN  sodium chloride, 500 mL, Once PRN   And  sodium chloride, 500 mL, Once PRN       Continuous    sodium chloride, 125 mL/hr  sodium chloride, 100 mL/hr, Last Rate: 100 mL/hr (03/23/23 1517)         Labs:    CBC    Recent Labs     03/23/23  1052 03/23/23  1333   WBC  --  9 55   HGB 8 5* 9 3*   HCT 25* 29 1*   PLT  --  123*     BMP    Recent Labs     03/23/23  1052 03/23/23  1333   SODIUM  --  141   K  --  4 7   CL  --  112*   CO2 25 23   AGAP  --  6   BUN  --  36*   CREATININE  --  2 07*   CALCIUM  --  8 0*       Coags    Recent Labs     03/23/23  1333   INR 1 18   PTT 29        Additional Electrolytes  Recent Labs     03/23/23  0932 03/23/23  1052   CAIONIZED 1 25 1 17          Blood Gas    No recent results  No recent results LFTs  No recent results    Infectious  No recent results  Glucose  Recent Labs     03/23/23  1333   GLUC 187*             Yo Vora MD

## 2023-03-23 NOTE — ASSESSMENT & PLAN NOTE
· History anemia due to CKD stage IV  · Baseline hemoglobin over the past year seems to be close to 10 0-11 5  · Follows Dr Hermann Mora on the outpatient setting  Last visit January 2023 recommended to follow-up with hematology in the outpatient setting as well as continue with iron supplementations  · Inguinal site no signs of hematoma, patient denies any black tarry stools or any other changes in his stools  · Plan  · CBC a m    · Patient follow-up with hematology on the outpatient setting

## 2023-03-23 NOTE — QUICK NOTE
Post Op Check:    Patients pain is well controlled  Much improved R foot pain  They denied any nausea, chest pain, or shortness of breath  General: NAD  HENT: NCAT MMM  Neck: supple, no JVD  CV: nl rate  Lungs: nl wob  No resp distress  ABD: Soft, nontender, nondistended  Extrem: No CCE  R groin dressing is c/d/i  No hematoma or bruising noted  Right foot motor sensory intact  Dop DP  Neuro: AAOx3     Plan:  Diet Regular; Regular House   Cont neurovasc checks    Continue asa/plavix  Continue to monitor  Pain and nausea control PRN    Mearl Mention, DO  Surgery, PGY-3

## 2023-03-23 NOTE — ASSESSMENT & PLAN NOTE
· History hyperuricemia follows Dr Doreen Peña  · Currently on allopurinol 300 mg once a day  · Last uric acid 01/2023 5 2 stable  · No acute flair  · Continue home meds

## 2023-03-23 NOTE — ASSESSMENT & PLAN NOTE
· History of sinus bradycardia  · Home meds Toprol XL 12 5 mg once a day  · Asymptomatic  · Home meds restarted by Vascular  · Continue monitoring heart rate, symptoms if bradycardia and symptomatic will consider and to hold home meds

## 2023-03-23 NOTE — ASSESSMENT & PLAN NOTE
Lab Results   Component Value Date    HGBA1C 7 6 (H) 02/10/2023       Recent Labs     03/23/23  0637 03/23/23  1323 03/23/23  1528   POCGLU 165* 174* 186*       Blood Sugar Average: Last 72 hrs:  (P) 175     · Previously on metformin however has been held by PCP due to decreased creatinine clearance  · Sliding scale  · Cardiac carbohydrate controlled diet

## 2023-03-23 NOTE — ANESTHESIA POSTPROCEDURE EVALUATION
Post-Op Assessment Note    CV Status:  Stable  Pain Score: 0    Pain management: adequate     Mental Status:  Alert and awake   Hydration Status:  Euvolemic   PONV Controlled:  Controlled   Airway Patency:  Patent      Post Op Vitals Reviewed: Yes      Staff: Anesthesiologist         No notable events documented  BP      Temp      Pulse     Resp      SpO2      /60 (BP Location: Right arm)   Pulse 59   Temp 98 °F (36 7 °C) (Oral) Comment: Simultaneous filing  User may not have seen previous data    Resp 13   Ht 5' 10" (1 778 m)   Wt 82 1 kg (181 lb)   SpO2 98%   BMI 25 97 kg/m²

## 2023-03-23 NOTE — ANESTHESIA PROCEDURE NOTES
Arterial Line Insertion  Performed by: Chucky Wu DO  Authorized by: Chucky Wu DO   Consent: Verbal consent obtained  Written consent obtained  Risks and benefits: risks, benefits and alternatives were discussed  Consent given by: patient  Patient understanding: patient states understanding of the procedure being performed  Patient consent: the patient's understanding of the procedure matches consent given  Procedure consent: procedure consent matches procedure scheduled  Relevant documents: relevant documents present and verified  Test results: test results available and properly labeled  Site marked: the operative site was marked  Radiology Images: Radiology Images displayed and confirmed  If images not available, report reviewed  Patient identity confirmed: arm band and verbally with patient  Preparation: Patient was prepped and draped in the usual sterile fashion    Indications: multiple ABGs and hemodynamic monitoring  Orientation:  Left  Location: radial artery  Procedure Details:  Needle gauge: 20  Seldinger technique: Seldinger technique used  Number of attempts: 2    Post-procedure:  Waveform: good waveform

## 2023-03-24 ENCOUNTER — DOCUMENTATION (OUTPATIENT)
Dept: VASCULAR SURGERY | Facility: CLINIC | Age: 78
End: 2023-03-24

## 2023-03-24 LAB
ABO GROUP BLD BPU: NORMAL
ABO GROUP BLD BPU: NORMAL
ANION GAP SERPL CALCULATED.3IONS-SCNC: 6 MMOL/L (ref 4–13)
BPU ID: NORMAL
BPU ID: NORMAL
BUN SERPL-MCNC: 39 MG/DL (ref 5–25)
CA-I BLD-SCNC: 1.09 MMOL/L (ref 1.12–1.32)
CALCIUM SERPL-MCNC: 8.2 MG/DL (ref 8.4–10.2)
CHLORIDE SERPL-SCNC: 110 MMOL/L (ref 96–108)
CO2 SERPL-SCNC: 22 MMOL/L (ref 21–32)
CREAT SERPL-MCNC: 1.96 MG/DL (ref 0.6–1.3)
CROSSMATCH: NORMAL
CROSSMATCH: NORMAL
ERYTHROCYTE [DISTWIDTH] IN BLOOD BY AUTOMATED COUNT: 16 % (ref 11.6–15.1)
GFR SERPL CREATININE-BSD FRML MDRD: 31 ML/MIN/1.73SQ M
GLUCOSE SERPL-MCNC: 157 MG/DL (ref 65–140)
GLUCOSE SERPL-MCNC: 183 MG/DL (ref 65–140)
GLUCOSE SERPL-MCNC: 184 MG/DL (ref 65–140)
GLUCOSE SERPL-MCNC: 238 MG/DL (ref 65–140)
HCT VFR BLD AUTO: 25.1 % (ref 36.5–49.3)
HGB BLD-MCNC: 8.2 G/DL (ref 12–17)
MAGNESIUM SERPL-MCNC: 1.8 MG/DL (ref 1.9–2.7)
MCH RBC QN AUTO: 29.9 PG (ref 26.8–34.3)
MCHC RBC AUTO-ENTMCNC: 32.7 G/DL (ref 31.4–37.4)
MCV RBC AUTO: 92 FL (ref 82–98)
PHOSPHATE SERPL-MCNC: 3.4 MG/DL (ref 2.3–4.1)
PLATELET # BLD AUTO: 110 THOUSANDS/UL (ref 149–390)
PMV BLD AUTO: 11 FL (ref 8.9–12.7)
POTASSIUM SERPL-SCNC: 4.4 MMOL/L (ref 3.5–5.3)
RBC # BLD AUTO: 2.74 MILLION/UL (ref 3.88–5.62)
SODIUM SERPL-SCNC: 138 MMOL/L (ref 135–147)
UNIT DISPENSE STATUS: NORMAL
UNIT DISPENSE STATUS: NORMAL
UNIT PRODUCT CODE: NORMAL
UNIT PRODUCT CODE: NORMAL
UNIT PRODUCT VOLUME: 300 ML
UNIT PRODUCT VOLUME: 350 ML
UNIT RH: NORMAL
UNIT RH: NORMAL
WBC # BLD AUTO: 9.78 THOUSAND/UL (ref 4.31–10.16)

## 2023-03-24 RX ORDER — DOCUSATE SODIUM 100 MG/1
100 CAPSULE, LIQUID FILLED ORAL 2 TIMES DAILY
Status: DISCONTINUED | OUTPATIENT
Start: 2023-03-24 | End: 2023-03-27 | Stop reason: HOSPADM

## 2023-03-24 RX ORDER — POLYETHYLENE GLYCOL 3350 17 G/17G
17 POWDER, FOR SOLUTION ORAL DAILY PRN
Status: DISCONTINUED | OUTPATIENT
Start: 2023-03-24 | End: 2023-03-27 | Stop reason: HOSPADM

## 2023-03-24 RX ADMIN — INSULIN LISPRO 3 UNITS: 100 INJECTION, SOLUTION INTRAVENOUS; SUBCUTANEOUS at 10:40

## 2023-03-24 RX ADMIN — ALLOPURINOL 300 MG: 300 TABLET ORAL at 09:57

## 2023-03-24 RX ADMIN — HEPARIN SODIUM 5000 UNITS: 5000 INJECTION INTRAVENOUS; SUBCUTANEOUS at 21:26

## 2023-03-24 RX ADMIN — CLOPIDOGREL BISULFATE 75 MG: 75 TABLET, FILM COATED ORAL at 09:55

## 2023-03-24 RX ADMIN — SODIUM CHLORIDE 100 ML/HR: 0.9 INJECTION, SOLUTION INTRAVENOUS at 00:55

## 2023-03-24 RX ADMIN — DOCUSATE SODIUM 100 MG: 100 CAPSULE, LIQUID FILLED ORAL at 10:39

## 2023-03-24 RX ADMIN — ATORVASTATIN CALCIUM 80 MG: 80 TABLET, FILM COATED ORAL at 16:16

## 2023-03-24 RX ADMIN — AMLODIPINE BESYLATE 10 MG: 10 TABLET ORAL at 09:55

## 2023-03-24 RX ADMIN — METOPROLOL SUCCINATE 12.5 MG: 25 TABLET, EXTENDED RELEASE ORAL at 09:55

## 2023-03-24 RX ADMIN — PANCRELIPASE 24000 UNITS: 24000; 76000; 120000 CAPSULE, DELAYED RELEASE PELLETS ORAL at 16:18

## 2023-03-24 RX ADMIN — PANCRELIPASE 24000 UNITS: 24000; 76000; 120000 CAPSULE, DELAYED RELEASE PELLETS ORAL at 07:32

## 2023-03-24 RX ADMIN — PANCRELIPASE 24000 UNITS: 24000; 76000; 120000 CAPSULE, DELAYED RELEASE PELLETS ORAL at 12:17

## 2023-03-24 RX ADMIN — PANTOPRAZOLE SODIUM 40 MG: 40 TABLET, DELAYED RELEASE ORAL at 09:55

## 2023-03-24 RX ADMIN — INSULIN LISPRO 1 UNITS: 100 INJECTION, SOLUTION INTRAVENOUS; SUBCUTANEOUS at 16:19

## 2023-03-24 RX ADMIN — HEPARIN SODIUM 5000 UNITS: 5000 INJECTION INTRAVENOUS; SUBCUTANEOUS at 14:29

## 2023-03-24 RX ADMIN — HEPARIN SODIUM 5000 UNITS: 5000 INJECTION INTRAVENOUS; SUBCUTANEOUS at 05:01

## 2023-03-24 RX ADMIN — ASPIRIN 81 MG: 81 TABLET, COATED ORAL at 09:55

## 2023-03-24 RX ADMIN — INSULIN LISPRO 1 UNITS: 100 INJECTION, SOLUTION INTRAVENOUS; SUBCUTANEOUS at 07:32

## 2023-03-24 RX ADMIN — DOCUSATE SODIUM 100 MG: 100 CAPSULE, LIQUID FILLED ORAL at 21:26

## 2023-03-24 NOTE — ASSESSMENT & PLAN NOTE
· History anemia due to CKD stage IV  · Baseline hemoglobin over the past year seems to be close to 10 0-11 5  · Follows Dr Kylie Peace on the outpatient setting  Last visit January 2023 recommended to follow-up with hematology in the outpatient setting as well as continue with iron supplementations  · Inguinal site no signs of hematoma, patient denies any black tarry stools or any other changes in his stools  · Plan  · CBC a m    · Patient follow-up with hematology on the outpatient setting

## 2023-03-24 NOTE — PROGRESS NOTES
Vascular Nurse Navigator Post Op Education    Met with patient at bedside to introduce myself as Vascular Nurse Navigator and explained my role  Patient is appropriate and accepting to education  Patient was educated with Review of written materials provided, Teachback, Explanation, Demonstration and Question & Answer on expectations of post op care and recovery on Right - Common femoral endarterectomy&antegrade intervention of SFA, popliteal artery w/ shockwave intravascular lithotripsy, and Balloon angioplasty of the anterior tibial artery  Patient is a former smoker, quit 3 weeks ago, as such Smoking effects on the lungs, tobacco triggers and Smoking cessation was reviewed  Education provided to patient on infection prevention, activity limitations, when to call the office, importance of follow up, and incisional care  Discharge instruction handout provided to patient to review  Provided patient with a pack of disposable washcloths, a pack of guaze, and a bottle of chlorhexidine for incision care upon discharge

## 2023-03-24 NOTE — ASSESSMENT & PLAN NOTE
· History hyperuricemia follows Dr Kyrie Rhodes  · Currently on allopurinol 300 mg once a day  · Last uric acid 01/2023 5 2 stable  · No acute flair  · Continue home meds

## 2023-03-24 NOTE — PLAN OF CARE
Problem: OCCUPATIONAL THERAPY ADULT  Goal: Performs self-care activities at highest level of function for planned discharge setting  See evaluation for individualized goals  Description: Treatment Interventions: ADL retraining, Functional transfer training, UE strengthening/ROM, Cognitive reorientation, Patient/family training, Endurance training, Equipment evaluation/education, Neuromuscular reeducation, Compensatory technique education, Energy conservation, Activityengagement          See flowsheet documentation for full assessment, interventions and recommendations  Note: Limitation: Decreased ADL status, Decreased UE strength, Decreased Safe judgement during ADL, Decreased endurance, Decreased cognition, Decreased self-care trans  Prognosis: Good  Assessment: Gio Wilkerson  is a [de-identified]y o  year old male who presents with history of hypertension, CAD, aortic stenosis status post TAVR, CKD, type 2 diabetes, CHF, hyperlipidemia, GERD, PAD, and ambulatory dysfunction  He was found to have right lower extremity ulcerations of midfoot and came in for elective femoral endarterectomy and distal SFA popliteal  Pt with active orders for OT and up with assist  Prior to admission, pt lives alone in a 2nd floor apt with 1 FOS to enter  Apt has a tub shower with grab bars, standard toilets  Pt reports he was I with ADLs, IADLs and mobility with no device  Owns a cane and RW  Niece lives in the apt on the first floor - they share cats  Niece works during the day  Drives, retired  [employer]  Reports 0 falls in the past 6 months  Upon evaluation, pt presenting below functional baseline with deficits noted in strength, activity tolerance, balance, safety awareness, cognition which is limiting pts functional ability to complete ADLs/ IADLs, functional transfers and functional mobility  Vitals: 121/57  Pt current level of function: bed mobility - unable to assess; transfers- supervision; functional mobility-supervision; UB dressing / bathing - supervision; LB dressing/ bathing- supervision; toileting - supervision  Pt would benefit from skilled OT 2-3x/wk to maximize functional independence  OT DC recommendation: home with home health services  MOCA completed with pt at recommendation of Deloris Nurse  Pt scored a 27/30-  Indicating no cog deficits       OT Discharge Recommendation: Home with home health rehabilitation

## 2023-03-24 NOTE — CASE MANAGEMENT
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 ([facility] ) 2023   • Anemia due to stage 4 chronic kidney disease ([place] [address] ) 2023   • Hyperuricemia 2023   • Benign hypertension with chronic kidney disease, stage III ([facility] ) 2022   • Secondary hyperparathyroidism of renal origin ([place] [address] ) 2022   • S/P TAVR (transcatheter aortic valve replacement) 2022   • Elevated serum creatinine 2022   • Chronic diastolic CHF (congestive heart failure) ([place] [address] ) 2022   • Acute kidney injury superimposed on chronic kidney disease ([place] [address] ) 2022   • Iron deficiency anemia 2022   • Persistent proteinuria, unspecified 01/10/2022   • Benign hypertension with chronic kidney disease, stage IV ([facility]  ) 01/10/2022   • Leukopenia 01/10/2022   • Hypomagnesemia 01/10/2022   • Renal cyst, left 01/10/2022   • GERD (gastroesophageal reflux disease)    • Hyperlipidemia    • Controlled type 2 diabetes mellitus with stage 4 chronic kidney disease, without long-term current use of insulin ([place] [address] ) 2021   • Sinus bradycardia 10/08/2020   • Calcific tendinitis of right shoulder region 2019   • Right shoulder pain 2019   • Cigarette nicotine dependence with nicotine-induced disorder 10/22/2018   • Stenosis of noncoronary bypass graft ([facility] ) 2016   • Asymptomatic bilateral carotid artery stenosis 2016   • S/P CABG (coronary artery bypass graft) 2016   • Hypertension 2016   • Aorto-iliac disease ([facility] ) 2016   • Renal artery stenosis, native, bilateral ([facility] ) 2016 • CAD (coronary artery disease) 08/12/2016   • Progressive angina ([facility] ) 08/12/2016   • Atherosclerosis of native arteries of extremities with intermittent claudication, bilateral legs (HCC)    • PVD (peripheral vascular disease) ([facility] )    • Tension headache 10/24/2013      LOS (days): 1  Geometric Mean LOS (GMLOS) (days): 3 90  Days to GMLOS:2 6     OBJECTIVE:    Risk of Unplanned Readmission Score: 26 55         Current admission status: Inpatient       Preferred Pharmacy:   [facility] [address]  Phone: [phone] Fax: JE[facility]ERRE [facility] - [address]   [address] [facility] [address]  Phone: [phone] Fax: [phone]    Primary Care Provider: Bhargav Brice MD    Primary Insurance: MEDICARE  Secondary Insurance: COMMERCIAL MISCELLANEOUS    ASSESSMENT:  Active Health Care Proxies     Oswaldo Roach [address] - Son   Primary Phone: [phone] (Mobile)               Advance Directives  Does patient have a [address]?: Yes (catrachita Giles)  Does patient have Advance Directives?: Yes  Advance Directives: Living will, Power of  for health care  Primary Contact: catrachita Giles [phone]         Readmission Root Cause  30 Day Readmission: No    Patient Information  Admitted from[de-identified] Home  Mental Status: Alert  During Assessment patient was accompanied by: Not accompanied during assessment  Assessment information provided by[de-identified] Patient  Primary Caregiver: Self  Support Systems: Family members, [address] of Residence: [address] do you live in?: [address] entry access options   Select all that apply : Stairs  Number of steps to enter home : 3  Do the steps have railings?: Yes  Type of Current Residence: Apartment  Floor Level: 2  Upon entering residence, is there a bedroom on the main floor (no further steps)?: Yes  Upon entering residence, is there a bathroom on the main floor (no further steps)?: Yes  In the last 12 months, was there a time when you were not able to pay the mortgage or rent on time?: No  In the last 12 months, how many places have you lived?: 1  In the last 12 months, was there a time when you did not have a steady place to sleep or slept in a shelter (including now)?: No  Homeless/housing insecurity resource given?: N/A  Living Arrangements: Lives Alone (niece lives in apartment below pt)  Is patient a ?: Yes  Is patient active with [facility])?: No    Activities of Daily Living Prior to Admission  Functional Status: Independent  Completes ADLs independently?: Yes  Ambulates independently?: Yes  Does patient use assisted devices?: No  Does patient currently own DME?: Yes  What DME does the patient currently own?: [place] [employer] (pt believes he has a walker at home in the attic)  Does the patient have a history of Short-Term Rehab?: No  Does patient have a history of HHC?: Yes  Does patient currently have [facility]?: No         Patient Information Continued  Income Source: SSI/SSD  Does patient have prescription coverage?: Yes  Within the past 12 months, you worried that your food would run out before you got the money to buy more : Never true  Food insecurity resource given?: N/A  Does patient receive dialysis treatments?: No  Does patient have a history of substance abuse?: No  Does patient have a history of Mental Health Diagnosis?: No         Means of Transportation  Means of Transport to Appts[de-identified] Drives Self  In the past 12 months, has lack of transportation kept you from medical appointments or from getting medications?: No  In the past 12 months, has lack of transportation kept you from meetings, work, or from getting things needed for daily living?: No  Was application for public transport provided?: N/A        DISCHARGE DETAILS:    Discharge planning discussed with[de-identified] Patient  Freedom of Choice: Yes  Comments - Freedom of Choice: CM reviewed recommendation for HHC - pt would like time to think about it and requesting CM follow up tomorrow  CM contacted family/caregiver?: No- see comments (declined need at this time)                       DME Referral Provided  Referral made for DME?: No              Treatment Team Recommendation: Home with Fco Hamilton Arguelles at Discharge : Family

## 2023-03-24 NOTE — ASSESSMENT & PLAN NOTE
Lab Results   Component Value Date    EGFR 29 03/23/2023    EGFR 31 03/06/2023    EGFR 32 02/27/2023    CREATININE 2 07 (H) 03/23/2023    CREATININE 1 95 (H) 03/06/2023    CREATININE 1 93 (H) 02/27/2023   History of hypertension CKD stage IV  Home meds Evphtgg17 mg once a day, lisinopril 10 mg once a day, patient has been prescribed torsemide 20 mg twice daily however endorse takes torsemide 10 mg once a day    Continue holding lisinopril and torsemide as per protocol  Continue Norvasc 10 mg once a day and Toprol XL 12 5  Consider restarting isinopril and torsemide today

## 2023-03-24 NOTE — PLAN OF CARE
Problem: PHYSICAL THERAPY ADULT  Goal: Performs mobility at highest level of function for planned discharge setting  See evaluation for individualized goals  Description: Treatment/Interventions: Functional transfer training, LE strengthening/ROM, Elevations, Therapeutic exercise, Endurance training, Patient/family training, Equipment eval/education, Bed mobility, Gait training, Compensatory technique education, Continued evaluation, Spoke to nursing  Equipment Recommended: Vivek Broderick (pt has)       See flowsheet documentation for full assessment, interventions and recommendations  Outcome: Progressing  Note: Prognosis: Good  Problem List: Decreased strength, Decreased endurance, Impaired balance, Decreased mobility, Impaired judgement, Decreased safety awareness, Pain  Assessment: Juan C Bejarano  is a [de-identified]y o  year old male who presents with history of hypertension, CAD, aortic stenosis status post TAVR, CKD, type 2 diabetes, CHF, hyperlipidemia, GERD, PAD, and ambulatory dysfunction  He was found to have right lower extremity ulcerations of midfoot and came in for elective femoral endarterectomy and distal SFA popliteal  PT consulted post operatively  Up with assist orders  Prior to admission resides alone in apt  Independent with ADLS and ambulates without AD prior to admission  Denies falls and was driving  Currently presents with functional limitations related to post operative pain, decreased activity tolerance compared to baseline, decreased balance, functional mobility and locomotion requiring RW support  Transfers with S   Amb with RW with S   Gait mildly antalgic  Tolerated well with use of RW support  Given impairments with decline from PLOF will benefit from skilled PT in order to optimize functional outcomes  The patient's AM-PAC Basic Mobility Inpatient Short Form Raw Score is 22  A Raw score of greater than 16 suggests the patient may benefit from discharge to home   Please also refer to the recommendation of the Physical Therapist for safe discharge planning  At this time, anticipate abiltiy to return home with family support  HHPT eval recommended  Has RW  Barriers to Discharge: Inaccessible home environment, Decreased caregiver support  Barriers to Discharge Comments: lives alone, flight to apt  PT Discharge Recommendation: Home with home health rehabilitation    See flowsheet documentation for full assessment

## 2023-03-24 NOTE — ASSESSMENT & PLAN NOTE
Lab Results   Component Value Date    HGBA1C 7 6 (H) 02/10/2023       Recent Labs     03/23/23  0637 03/23/23  1323 03/23/23  1528 03/23/23  1741   POCGLU 165* 174* 186* 204*       Blood Sugar Average: Last 72 hrs:  (P) 182 25     · Previously on metformin however has been held by PCP due to decreased creatinine clearance  · Sliding scale  · Cardiac carbohydrate controlled diet

## 2023-03-24 NOTE — PROGRESS NOTES
"Progress Note - Vascular Surgery   Humberto Pal  [de-identified] y o  male MRN: [id number]  Unit/Bed#: ICU 10 Encounter: [id number]    Assessment:  78M smoker w/HTN, HLD, type II DM, CKD, CAD s/p CABG '13, aortic stenosis s/p TAVR '22, bilateral carotid stenosis and PAD, presenting with rest pain/tissue loss of the RLE  S/p R CFA endarterectomy, bovine patch angioplasty w/antegrade SFA/pop PTA/shockwave angioplasty 3/23/23 ([facility])    Hgb  8 2  WBC 9 78  SCr/eGFR 1 96/31    Plan:  - POD#1 s/p revascularization of the RLE for tissue loss/rest pain  Patient states that RLE pain has significantly improved  - Incision is clean, dry and intact  Dressing in place  - Excellent R AT doppler   - Patient with 1L blood loss intraop, s/p 2U PRBC in the OR  Hgb remains stable  - OOB, ambulate  - D/c marcus rubi  - Continue ASA, plavix, statin  - PT/OT  - Anticipate d/c tomorrow, stable to transfer out of ICU   ,   Subjective:  Patient seen and examined  Offers no complaints  States that right foot feels significantly better  Denies chest pain or SOB    Vitals:  /56   Pulse 66   Temp 98 °F (36 7 °C) (Oral)   Resp 20   Ht 5' 10\" (1 778 m)   Wt 82 1 kg (181 lb)   SpO2 98%   BMI 25 97 kg/m²     I/Os:  I/O last 3 completed shifts: In: 6095 [P O :240;  I V :4905; Blood:700; IV Piggyback:250]  Out: 2322 [Urine:1440; Blood:1000]  I/O this shift:  In: -   Out: 100 [Urine:100]    Lab Results and Cultures:   CBC with diff:   Lab Results   Component Value Date    WBC 9 78 03/24/2023    HGB 8 2 (L) 03/24/2023    HCT 25 1 (L) 03/24/2023    MCV 92 03/24/2023     (L) 03/24/2023    MCH 29 9 03/24/2023    MCHC 32 7 03/24/2023    RDW 16 0 (H) 03/24/2023    MPV 11 0 03/24/2023    NRBC 0 03/23/2023   ,   BMP/CMP:  Lab Results   Component Value Date    SODIUM 138 03/24/2023    K 4 4 03/24/2023    K 4 1 11/22/2015     (H) 03/24/2023     11/22/2015    CO2 22 03/24/2023    CO2 25 03/23/2023    ANIONGAP 7 11/22/2015    " BUN 39 (H) 03/24/2023    BUN 15 11/22/2015    CREATININE 1 96 (H) 03/24/2023    CREATININE 0 90 11/22/2015    GLUCOSE 163 (H) 03/23/2023    GLUCOSE 125 11/22/2015    CALCIUM 8 2 (L) 03/24/2023    CALCIUM 8 4 11/22/2015    AST 11 02/10/2023    ALT 14 02/10/2023    ALKPHOS 87 02/10/2023    EGFR 31 03/24/2023   ,   Lipid Panel: No results found for: CHOL,   Coags:   Lab Results   Component Value Date    PTT 29 03/23/2023    PTT 27 10/28/2015    INR 1 18 03/23/2023    INR 1 14 10/30/2015   ,     Blood Culture: No results found for: BLOODCX,   Urinalysis:   Lab Results   Component Value Date    COLORU Yellow 09/29/2022    CLARITYU Clear 09/29/2022    SPECGRAV 1 018 09/29/2022    PHUR 5 5 09/29/2022    LEUKOCYTESUR Negative 09/29/2022    NITRITE Negative 09/29/2022    GLUCOSEU Negative 09/29/2022    KETONESU Negative 09/29/2022    BILIRUBINUR Negative 09/29/2022    BLOODU Negative 09/29/2022   ,   Urine Culture: No results found for: URINECX,   Wound Culure: No results found for: WOUNDCULT    Medications:  Current Facility-Administered Medications   Medication Dose Route Frequency   • acetaminophen (TYLENOL) tablet 650 mg  650 mg Oral Q6H PRN   • allopurinol (ZYLOPRIM) tablet 300 mg  300 mg Oral Daily   • amLODIPine (NORVASC) tablet 10 mg  10 mg Oral Daily   • aspirin (ECOTRIN LOW STRENGTH) EC tablet 81 mg  81 mg Oral Daily   • atorvastatin (LIPITOR) tablet 80 mg  80 mg Oral Daily With Dinner   • clopidogrel (PLAVIX) tablet 75 mg  75 mg Oral Daily   • docusate sodium (COLACE) capsule 100 mg  100 mg Oral BID   • heparin (porcine) 2,000 Units, papaverine 60 mg in multi-electrolyte (PLASMALYTE-A/ISOLYTE-S PH 7 4) 500 mL irrigation   Irrigation Once   • heparin (porcine) subcutaneous injection 5,000 Units  5,000 Units Subcutaneous Q8H [address]   • insulin lispro (HumaLOG) 100 units/mL subcutaneous injection 1-6 Units  1-6 Units Subcutaneous TID AC   • lactated ringers bolus 500 mL  500 mL Intravenous Once PRN    And   • lactated ringers bolus 500 mL  500 mL Intravenous Once PRN   • metoprolol succinate (TOPROL-XL) 24 hr tablet 12 5 mg  12 5 mg Oral Daily   • morphine injection 2 mg  2 mg Intravenous Q3H PRN   • ondansetron (ZOFRAN) injection 4 mg  4 mg Intravenous Q6H PRN   • oxyCODONE (ROXICODONE) immediate release tablet 10 mg  10 mg Oral Q4H PRN   • oxyCODONE (ROXICODONE) IR tablet 5 mg  5 mg Oral Q4H PRN   • pancrelipase (Lip-Prot-Amyl) (CREON) delayed release capsule 24,000 Units  24,000 Units Oral TID With Meals   • pantoprazole (PROTONIX) EC tablet 40 mg  40 mg Oral Daily   • polyethylene glycol (MIRALAX) packet 17 g  17 g Oral Daily PRN   • sodium chloride 0 9 % bolus 500 mL  500 mL Intravenous Once PRN    And   • sodium chloride 0 9 % bolus 500 mL  500 mL Intravenous Once PRN       Imaging:  Reviewed     Physical Exam:    General: Alert and oriented  In no acute distress   CV: Regular rate   Respiratory: Normal effort  Abdominal: Soft, non-distended   Extremities: RLE warm, well perfused  +AT signal  Incision with dressing in place   Soft, no hematoma   Neurologic: Grossly normal      Kashmir Burt PA-C  3/24/2023

## 2023-03-24 NOTE — PROGRESS NOTES
"Daily Progress Note - Critical Care   Cruz Saba  [de-identified] y o  male MRN: [id number]  Unit/Bed#: ICU 10 Encounter: [id number]  ----------------------------------------------------------------------------------------  HPI/24hr events: \"Jay is a [de-identified] y o  male with HFpEF (EF 65%), HTN, CKD Stage 4, Type 2 DM, Hyperuricemia, HLD, sinus bradycardia  GERD  S/p TAVR (2022), S/P CABG (1923), PAD with complication who presents after was found with right lower extremity ulcerations of midfoot currently status post right femoral endarterectomy and distal SFA popliteal  Denies any fever, chills, dyspnea, chest pain, abdominal pain as well as no urinary symptoms  Denies any pain or discomfort around incisional site  \"    No acute overnight events     ---------------------------------------------------------------------------------------  SUBJECTIVE  No complaints, feels well     Review of Systems  Review of systems was reviewed and negative unless stated above in HPI/24-hour events   ---------------------------------------------------------------------------------------  Assessment and Plan:    * 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 (HCC)  Assessment & Plan  · Hx with new right foot ischemic wounds lateral foot and heel    · Today s/p  Right femoral artery endarterectomy and distal SFA popliteal  · Plan:  · Neuro exam per protocol  · Blood pressure goal -170  · Asa 81mg + Plavix 75 mg as per vascular surgery  · Continue Lipitor 80 mg once a day    CAD (coronary artery disease)  Assessment & Plan  · History of CAD, status post CABG (2013), bilateral carotid stenosis (>70)  · Status post right femoral artery endarterectomy and distal SFA popliteal  · Plan  · Continue aspirin plus Plavix plus statin      Hyperlipidemia  Assessment & Plan  · History hyperlipidemia  · Continue Lipitor 80 mg once a day    Sinus bradycardia  Assessment & Plan  · History of sinus " bradycardia  · Home meds Toprol XL 12 5 mg once a day  · Asymptomatic  · Home meds restarted by Vascular  · Continue monitoring heart rate, symptoms if bradycardia and symptomatic will consider and to hold home meds  Anemia due to stage 4 chronic kidney disease (HCC)  Assessment & Plan  · History anemia due to CKD stage IV  · Baseline hemoglobin over the past year seems to be close to 10 0-11 5  · Follows Dr Yoav Ramírez on the outpatient setting  Last visit January 2023 recommended to follow-up with hematology in the outpatient setting as well as continue with iron supplementations  · Inguinal site no signs of hematoma, patient denies any black tarry stools or any other changes in his stools  · Plan  · CBC a m    · Patient follow-up with hematology on the outpatient setting    Benign hypertension with chronic kidney disease, stage IV [facility])  Assessment & Plan  Lab Results   Component Value Date    EGFR 29 03/23/2023    EGFR 31 03/06/2023    EGFR 32 02/27/2023    CREATININE 2 07 (H) 03/23/2023    CREATININE 1 95 (H) 03/06/2023    CREATININE 1 93 (H) 02/27/2023   History of hypertension CKD stage IV  Home meds Elftnuc22 mg once a day, lisinopril 10 mg once a day, patient has been prescribed torsemide 20 mg twice daily however endorse takes torsemide 10 mg once a day    Continue holding lisinopril and torsemide as per protocol  Continue Norvasc 10 mg once a day and Toprol XL 12 5  Consider restarting isinopril and torsemide today     GERD (gastroesophageal reflux disease)  Assessment & Plan  · History of GERD  · Continue home meds Protonix 40 mg twice daily    Hyperuricemia  Assessment & Plan  · History hyperuricemia follows Dr Yoav Ramírez  · Currently on allopurinol 300 mg once a day  · Last uric acid 01/2023 5 2 stable  · No acute flair  · Continue home meds    Controlled type 2 diabetes mellitus with stage 4 chronic kidney disease, without long-term current use of insulin [facility])  Assessment & Plan  Lab Results Component Value Date    HGBA1C 7 6 (H) 02/10/2023       Recent Labs     23  0637 23  1323 23  1528 23  1741   POCGLU 165* 174* 186* 204*       Blood Sugar Average: Last 72 hrs:  (P) 182 25     · Previously on metformin however has been held by PCP due to decreased creatinine clearance  · Sliding scale  · Cardiac carbohydrate controlled diet        Patient appropriate for transfer out of the ICU today?: Patient does not meet criteria for ICU Follow-up Clinic; referral has not been made  Disposition: Home v Med Surg as per [facility]   Code Status: Level 1 - Full Code  ---------------------------------------------------------------------------------------  ICU CORE MEASURES    Prophylaxis   VTE Pharmacologic Prophylaxis: Heparin  VTE Mechanical Prophylaxis: sequential compression device  Stress Ulcer Prophylaxis: Prophylaxis Not Indicated      Invasive Devices Review  Invasive Devices     Peripheral Intravenous Line  Duration           Peripheral IV 23 Dorsal (posterior); Right Hand <1 day    Peripheral IV 23 Left Hand <1 day          Arterial Line  Duration           Arterial Line 23 Left Radial <1 day          Drain  Duration           Urethral Catheter Coude 16 Fr  <1 day              Can any invasive devices be discontinued today?  Yes  ---------------------------------------------------------------------------------------  OBJECTIVE    Vitals   Vitals:    23 2200 23 2259 23 2300 23 0000   BP: (!) 148/49  150/56 138/53   BP Location:       Pulse: 60  62 62   Resp: 19  13 20   Temp:  98 2 °F (36 8 °C)     TempSrc:  Oral     SpO2: 96%  98% 97%   Weight:       Height:         Temp (24hrs), Av 6 °F (36 4 °C), Min:96 7 °F (35 9 °C), Max:98 2 °F (36 8 °C)  Current: Temperature: 98 2 °F (36 8 °C)    Invasive/non-invasive ventilation settings   Respiratory    Lab Data (Last 4 hours)    None         O2/Vent Data (Last 4 hours)    None                Physical Exam  Vitals reviewed  Constitutional:       General: He is not in acute distress  Appearance: He is not diaphoretic  HENT:      Head: Normocephalic and atraumatic  Eyes:      General: Gaze aligned appropriately  Cardiovascular:      Rate and Rhythm: Normal rate and regular rhythm  Pulmonary:      Effort: Pulmonary effort is normal  No tachypnea or respiratory distress  Breath sounds: Normal breath sounds  Abdominal:      Palpations: Abdomen is soft  Musculoskeletal:      Cervical back: Normal range of motion  Feet:      Comments: R groin dressing   Clean dry   Neurological:      General: No focal deficit present  Mental Status: He is alert and oriented to person, place, and time  GCS: GCS eye subscore is 4  GCS verbal subscore is 5  GCS motor subscore is 6  Laboratory and Diagnostics:  Results from last 7 days   Lab Units 03/23/23  1333 03/23/23  1052 03/23/23  0932   WBC Thousand/uL 9 55  --   --    HEMOGLOBIN g/dL 9 3*  --   --    I STAT HEMOGLOBIN g/dl  --  8 5* 9 2*   HEMATOCRIT % 29 1*  --   --    HEMATOCRIT, ISTAT %  --  25* 27*   PLATELETS Thousands/uL 123*  --   --    NEUTROS PCT % 77*  --   --    MONOS PCT % 7  --   --      Results from last 7 days   Lab Units 03/23/23  1333 03/23/23  1052 03/23/23  0932   SODIUM mmol/L 141  --   --    POTASSIUM mmol/L 4 7  --   --    CHLORIDE mmol/L 112*  --   --    CO2 mmol/L 23  --   --    CO2, I-STAT mmol/L  --  25 25   ANION GAP mmol/L 6  --   --    BUN mg/dL 36*  --   --    CREATININE mg/dL 2 07*  --   --    CALCIUM mg/dL 8 0*  --   --    GLUCOSE RANDOM mg/dL 187*  --   --           Results from last 7 days   Lab Units 03/23/23  1333   INR  1 18   PTT seconds 29              ABG:    VBG:        Intake and Output  I/O       03/22 0701 03/23 0700 03/23 0701 03/24 0700    P  O   240    I V  (mL/kg)  4305 (52 4)    Blood  700    IV Piggyback  250    Total Intake(mL/kg)  5495 (66 9)    Urine (mL/kg/hr)  840 (0 4)    Blood "1000    Total Output  1840    Net  +3655                   Height and Weights   Height: 5' 10\" (177 8 cm)  IBW (Ideal Body Weight): 73 kg  Body mass index is 25 97 kg/m²  Weight (last 2 days)     Date/Time Weight    03/23/23 1501 82 1 (181)    03/23/23 0615 78 1 (172 18)            Nutrition       Diet Orders   (From admission, onward)             Start     Ordered    03/23/23 1711  Diet Cardiovascular; Sodium 2 GM; Consistent Carbohydrate Diet Level 2 (5 carb servings/75 grams CHO/meal)  Diet effective now        References:    Nutrtion Support Algorithm Enteral vs  Parenteral   Question Answer Comment   Diet Type Cardiovascular    Cardiac Sodium 2 GM    Other Restriction(s): Consistent Carbohydrate Diet Level 2 (5 carb servings/75 grams CHO/meal)    RD to adjust diet per protocol?  No        03/23/23 1710                  Active Medications  Scheduled Meds:  Current Facility-Administered Medications   Medication Dose Route Frequency Provider Last Rate   • acetaminophen  650 mg Oral Q6H PRN Emmie Calderon MD     • allopurinol  300 mg Oral Daily Emmie Calderon MD     • amLODIPine  10 mg Oral Daily Emmie Calderon MD     • aspirin  81 mg Oral Daily Emmie Calderon MD     • atorvastatin  80 mg Oral Daily With Makayla Paulino MD     • clopidogrel  75 mg Oral Daily Emmie Calderon MD     • heparin 2000 units and papaverine 60 mg in isolyte equivalent 500 mL irrigation bag   Irrigation Once Dylon Lopez MD     • heparin (porcine)  5,000 Units Subcutaneous [facility] [address], PA-C     • insulin lispro  1-6 Units Subcutaneous [address], [place]     • lactated ringers  500 mL Intravenous Once PRN Emmie Calderon MD      And   • lactated ringers  500 mL Intravenous Once PRN Emmie Calderon MD     • metoprolol succinate  12 5 mg Oral Daily Emmie Calderon MD     • morphine injection  2 mg Intravenous Q3H PRN Marques Marcus PA-C     • ondansetron  4 mg Intravenous Q6H PRN Marques Marcus" "LYN     • oxyCODONE  10 mg Oral Q4H PRN Jacqui Bo PA-C     • oxyCODONE  5 mg Oral Q4H PRN Jacqui Bo PA-C     • pancrelipase (Lip-Prot-Amyl)  24,000 Units Oral TID With Meals Edd Shen MD     • pantoprazole  40 mg Oral Daily Edd Shen MD     • sodium chloride  500 mL Intravenous Once PRN Edd Shen MD      And   • sodium chloride  500 mL Intravenous Once PRN Edd Shen MD     • sodium chloride  125 mL/hr Intravenous Continuous Lillian Cooper DO     • sodium chloride  100 mL/hr Intravenous Continuous Edd Shen  mL/hr (03/24/23 0055)     Continuous Infusions:  sodium chloride, 125 mL/hr  sodium chloride, 100 mL/hr, Last Rate: 100 mL/hr (03/24/23 0055)      PRN Meds:   acetaminophen, 650 mg, Q6H PRN  lactated ringers, 500 mL, Once PRN   And  lactated ringers, 500 mL, Once PRN  morphine injection, 2 mg, Q3H PRN  ondansetron, 4 mg, Q6H PRN  oxyCODONE, 10 mg, Q4H PRN  oxyCODONE, 5 mg, Q4H PRN  sodium chloride, 500 mL, Once PRN   And  sodium chloride, 500 mL, Once PRN        Allergies   No Known Allergies  ---------------------------------------------------------------------------------------  Advance Directive and Living Will: Yes    Power of :    POLST:    ---------------------------------------------------------------------------------------  Care Time Delivered:   No Critical Care time spent     ZHANNA Buckner      Portions of the record may have been created with voice recognition software  Occasional wrong word or \"sound a like\" substitutions may have occurred due to the inherent limitations of voice recognition software    Read the chart carefully and recognize, using context, where substitutions have occurred    "

## 2023-03-24 NOTE — CONSULTS
Consultation - Geriatric Medicine   Gio Wilkerson  [de-identified] y o  male MRN: [id number]  Unit/Bed#: ICU 10 Encounter: [id number]      Assessment/Plan   Atherosclerosis of native artery of right lower extremity with ulceration midfoot  · With new right foot ischemic wounds on lateral foot and heel  · Underwent right femoral artery enterectomy and distal SFA popliteal with vascular surgery  · Continue neuro assessments  · Blood pressure goals 1 10-1 70  · Aspirin and Plavix per vascular surgery  · Currently on oxycodone 5 mg for moderate pain, oxycodone 10 mg for severe pain, and morphine 2 mg for breakthrough pain  · Recommend geriatric pain protocol including scheduled Tylenol and decreased doses of oxycodone 2 5 mg for moderate pain and 5 mg for severe pain  · Management per vascular surgery    CAD  · Known history, status post CABG in 2013 and bilateral carotid stenosis  · Status post remote artery endarterectomy and distal SFA popliteal  · Continue aspirin, Plavix, and statin    Hyperlipidemia  · Most recent lipid panel from May 2022 showed cholesterol 95, triglycerides 50, HDL 52, LDL 33  · Continue Lipitor 80 mg daily  · Follow-up with PCP on discharge    Anemia   · History of anemia due to CKD stage IV  · Hemoglobin today 8 2  · Baseline appears to be more around 10-11 5  · Continue iron supplements  · Follows outpatient with hematology, recommend following up on discharge  · Management per primary    CKD IV  Creatinine today 1 96, BUN 39, GFR 31  Avoid nephrotoxic medication-lisinopril and torsemide on hold at this time-restarted as per primary  Renal dose medications as needed  Encourage adequate p o   Hydration  Avoid hypotension  Periodic lab work to monitor renal function  · Continue to monitor    Hypertension  · Blood pressure this morning 124/58  · Blood pressures have trended between 122//77  · Currently on amlodipine 10 mg daily and metoprolol succinate 12 5 mg daily  · Home lisinopril and torsemide on hold at this time  · Avoid hypotension  · Encourage cardiac low-sodium diet  · Management per primary and critical care    Type 2 diabetes  · Last A1c 7 6 on 2/10/2023  · Patient previously on metformin which was discontinued due to renal function  · Currently on sliding scale insulin while hospitalized  · Avoid hypoglycemia  · Management per primary    Constipation  With chronic constipation  Last BM was prior to hospitalization  Is currently not on bowel regimen  Is currently on as needed oxycodone for pain management  We will start patient on Colace 100 mg twice daily and MiraLAX prn  Encourage adequate p o   Hydration  Encourage prune juice as tolerated    Delirium precautions  Patient is alert oriented x4  At risk for delirium due to cognitive impairment  Recommend delirium precautions  Maintain sleep-wake cycle, avoid nighttime interruptions  Provide adequate pain control  Avoid urinary retention and constipation  Provide frequent and early mobilization  Provide frequent redirection and reorientation as needed  Avoid medications that may worsen or precipitate delirium such as tramadol, benzodiazepines, anticholinergics, and Benadryl  Redirect unwanted behaviors as first-line therapy, avoid physical restraints as able to  · Continue to monitor    Cognitive screening  · On exam patient is alert and oriented x4  · Patient does report some short-term memory issues  · Mini cog 3 out of 5, 2 points for clock draw and 1 out of 3 for recall  · No further cognitive testing completed in past  · May benefit from MoCA testing in future  · Support system includes his son  · No recent TSH or vitamin B12 levels checked, recommend checking with daily labs  · Delirium precautions as above    Vision impairment  Patient does have vision impairment  Wears glasses at baseline   Recommend use of corrective lenses at all appropriate times  Encourage adequate lighting and encourage use of assistance with ambulation  Keep personal belongings close to avoid reaching  Encourage appropriate footwear at all times  Recommend large font for printed materials provided to patient      Frailty  Clinical Frail Scale: 5 living with mild frailty  Total protein 7 1 and albumin 3 6  Encourage adequate hydration and nutrition, including protein in meals  OOB as tolerated  PT/OT consulted  · Encourage early and frequent mobilization      Ambulatory dysfunction  At a baseline ambulates with out assistive device  PT/OT following  Fall precautions  Out of bed as tolerated  Encourage early and frequent mobilization  Encourage adequate hydration and nutrition  Provide adequate pain management   Goal is to return home  · Continue with PT/OT for continued strength and balance training       Home medication review  calcitrol   25mg tid   pantoprazole 40mg dailly  toprolo xl 12 5mg daily  Amlodipine 10mg daily  liptor 80mg daily  Lisinopril 10mg daily   Allopurinol 300mg daily  toresmide 20mg bid  Aspirin 81 mg daily  Potassium chloride 10 mEq daily    Personally confirmed with [facility] [employer] service pharmacy [place] [phone]    History of Present Illness   Physician Requesting Consult: Karen Bryant MD  Reason for Consult / Principal Problem: Encounter for geriatric assessment  Hx and PE limited by: None  Additional history obtained from: Chart review and patient evaluation      HPI: Omid Lay  is a [de-identified]y o  year old male who presents with history of hypertension, CAD, aortic stenosis status post TAVR, CKD, type 2 diabetes, CHF, hyperlipidemia, GERD, PAD, and amatory dysfunction  He was found to have right lower extremity ulcerations of midfoot and came in for elective femoral endarterectomy and distal SFA popliteal     Patient lives at home alone in a apartment  He ambulates without any assistive devices  He denies any falls in the last year  He is independent for ADLs    He has grab bars in the bathroom, no shower chairs  He manages all the cooking, cleaning, medications, and finances  He denies any issues with insomnia, anxiety, or depression  He states his appetite has been stable, denies any appetite loss or weight loss  He still drives, denies any recent accidents or tickets  Upon examination patient was up in the chair, resting  He appeared comfortable and was in no acute distress  His pain was well controlled  He had some trouble sleeping last night due to being frequently woken up for checks  His appetite is slightly decreased  Per nursing no acute concerns or issues at this time  Inpatient consult to Gerontology  Consult performed by: ZHANNA Ortiz  Consult ordered by: ZHANNA Valencia          Review of Systems    Historical Information   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 ([facility] ) 09/10/2021   • 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 ([facility] ) 2/24/2023   • Basal cell carcinoma     right cheek   • CAD (coronary artery disease)    • Carotid stenosis, asymptomatic, bilateral    • Chronic kidney disease    • Colon polyp    • Diabetes ([facility] )     type 2, non-insulin dependent   • Diabetes mellitus (HCC)    • GERD (gastroesophageal reflux disease)    • History of nephrolithiasis    • Hyperlipidemia    • Hypertension    • Left foot pain 11/30/2022   • PAD (peripheral artery disease) ([facility])    • Severe aortic stenosis    • Squamous cell skin cancer 11/22/2022    left superior helix     Past Surgical History:   Procedure Laterality Date   • APPENDECTOMY     • CARDIAC CATHETERIZATION N/A 05/05/2022    Procedure: CARDIAC RHC/LHC; Surgeon: Re Patel DO;  Location: [facility] CARDIAC [facility];   Service: Cardiology   • CARDIAC CATHETERIZATION N/A 05/05/2022    Procedure: Cardiac Coronary Angiogram;  Surgeon: Re Patel DO;  Location: [facility] CARDIAC [facility];  Service: Cardiology   • CARDIAC CATHETERIZATION N/A 2022    Procedure: Cardiac pci;  Surgeon: Love Perez MD;  Location: [facility] CARDIAC [facility];   Service: Cardiology   • CARDIAC CATHETERIZATION N/A 2022    Procedure: CARDIAC TAVR;  Surgeon: Jaz Kc MD;  Location: [facility] MAIN OR;  Service: Cardiology   • COLECTOMY     • COLONOSCOPY     • CORONARY ARTERY BYPASS GRAFT  2013    X 2   • FEMORAL ARTERY - POPLITEAL ARTERY BYPASS GRAFT     • HEMORRHOID SURGERY     • IR AORTAGRAM WITH RUN-OFF  2018   • IR AORTAGRAM WITH RUN-OFF  3/2/2023   • MOHS SURGERY Left 2023    SCC left superior helix-Dr Tovar   • ME SLCTV CATHJ 3RD+ ORD SLCTV ABDL PEL/LXTR [facility] Left 2016    Procedure: LEFT FEMORAL ARTERIOGRAM; BALLOON ANGIOPLASTY; SFA  AND FEMORAL AT VEIN GRAFT;  Surgeon: Gui Abel MD;  Location: [facility] MAIN [facility];  Service: Vascular   • ME TRANSCATHETER TRANSAPICAL REPLACEMT AORTIC VALVE N/A 2022    Procedure: REPLACEMENT AORTIC VALVE TRANSCATHETER (TAVR) TRANSAPICAL 29MM IRVIN KYLER S3 ULTRA VALVE(ACCESS ON LEFT) ELANA;  Surgeon: Kathy Moon DO;  Location: [facility] MAIN [facility];  Service: Cardiac Surgery   • SKIN CANCER EXCISION     • TONSILLECTOMY AND ADENOIDECTOMY       Social History   Social History     Substance and Sexual Activity   Alcohol Use Not Currently    Comment: rare     Social History     Substance and Sexual Activity   Drug Use No     Social History     Tobacco Use   Smoking Status Former   • Packs/day: 1 00   • Years: 50 00   • Pack years: 50 00   • Types: Cigarettes   • Quit date: 3/3/2023   • Years since quittin 0   Smokeless Tobacco Never     Family History:   Family History   Problem Relation Age of Onset   • Heart attack Father    • Other Sister         bypass and vlave replacement   • Stroke Paternal Uncle    • Arrhythmia Neg Hx    • Asthma Neg Hx    • Clotting disorder Neg Hx    • Fainting Neg Hx    • Anuerysm Neg Hx    • Hypertension Neg Hx         unsure    • "Hyperlipidemia Neg Hx    • Heart failure Neg Hx        Meds/Allergies   all current active meds have been reviewed    No Known Allergies    Objective     Intake/Output Summary (Last 24 hours) at 3/24/2023 0923  Last data filed at 3/24/2023 0800  Gross per 24 hour   Intake 5695 01 ml   Output 2440 ml   Net 3255 01 ml     Invasive Devices     Peripheral Intravenous Line  Duration           Peripheral IV 03/23/23 Dorsal (posterior); Right Hand 1 day    Peripheral IV 03/23/23 Left Hand 1 day                Physical Exam    Lab Results:   I have personally reviewed pertinent lab results including the following:  -CBC, BMP, magnesium, phosphorus,    I have personally reviewed the following imaging study reports in PACS:  -No relevant imaging this hospitalization      Therapies:   PT: consulted  OT: consulted    VTE Prophylaxis: Sequential compression device (Venodyne)  and Heparin    Code Status: Level 1 - Full Code  Advance Directive and Living Will: Yes    Power of :  yes  POLST:  no    Family and Social Support:   Living Arrangements: Lives Alone; Other (Comment) (family lives below patient)  Support Systems: Family members; Son  Assistance Needed: none  Type of Current Residence: Private residence (apartment)  Current Home Care Services: No      Goals of Care: Return home    Please note:  Voice-recognition software may have been used in the preparation of this document  Occasional wrong word or \"sound-alike\" substitutions may have occurred due to the inherent limitations of voice recognition software  Interpretation should be guided by context      "

## 2023-03-24 NOTE — OCCUPATIONAL THERAPY NOTE
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 ([facility])  Active Problems:    CAD (coronary artery disease)    Sinus bradycardia    Controlled type 2 diabetes mellitus with stage 4 chronic kidney disease, without long-term current use of insulin ([facility])    GERD (gastroesophageal reflux disease)    Hyperlipidemia    Benign hypertension with chronic kidney disease, stage IV ([facility])    Anemia due to stage 4 chronic kidney disease ([facility] )    Hyperuricemia    Past Medical History  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 ([facility] [address] ) 09/10/2021    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 ([facility] ) 2/24/2023    Basal cell carcinoma     right cheek    CAD (coronary artery disease)     Carotid stenosis, asymptomatic, bilateral     Chronic kidney disease     Colon polyp     Diabetes ([facility] [address] )     type 2, non-insulin dependent    Diabetes mellitus ([facility] [address] )     GERD (gastroesophageal reflux disease)     History of nephrolithiasis     Hyperlipidemia     Hypertension     Left foot pain 11/30/2022    PAD (peripheral artery disease) ([facility])     Severe aortic stenosis     Squamous cell skin cancer 11/22/2022    left superior helix     Past Surgical History  Past Surgical History:   Procedure Laterality Date    APPENDECTOMY      CARDIAC CATHETERIZATION N/A 05/05/2022    Procedure: CARDIAC RHC/LHC; Surgeon: Michelle Corona DO;  [facility]; Service: Cardiology    CARDIAC CATHETERIZATION N/A 05/05/2022    Procedure: Cardiac Coronary Angiogram;  Surgeon: Michelle Corona DO;  Location: [facility];   Service: Cardiology    CARDIAC CATHETERIZATION N/A 05/24/2022    Procedure: Cardiac pci;  Surgeon: Elizabeth De La Paz MD; [facility]; Service: Cardiology    CARDIAC CATHETERIZATION N/A 06/14/2022    Procedure: CARDIAC TAVR;  Surgeon: Bo Berg MD;  Location: [facility] MAIN OR;  Service: Cardiology    COLECTOMY      COLONOSCOPY  2013    CORONARY ARTERY BYPASS GRAFT  2013    X 2    FEMORAL ARTERY - POPLITEAL ARTERY BYPASS GRAFT      HEMORRHOID SURGERY      IR AORTAGRAM WITH RUN-OFF  11/19/2018    IR AORTAGRAM WITH RUN-OFF  3/2/2023    MOHS SURGERY Left 01/11/2023    SCC left superior helix-Dr Guy    NE SLCTV CATHJ 3RD+ ORD SLCTV ABDL PEL/LXTR [facility] Left 08/12/2016    Procedure: LEFT FEMORAL ARTERIOGRAM; BALLOON ANGIOPLASTY; SFA  AND FEMORAL AT VEIN GRAFT;  Surgeon: Rosana Valenzuela MD;  Location: [facility] MAIN OR;  Service: Vascular    NE TRANSCATHETER TRANSAPICAL REPLACEMT AORTIC VALVE N/A 06/14/2022    Procedure: REPLACEMENT AORTIC VALVE TRANSCATHETER (TAVR) TRANSAPICAL 29MM IRVIN KYLER S3 ULTRA VALVE(ACCESS ON LEFT) ELANA;  Surgeon: Nevaeh Chew DO;  Location: [facility] MA[facility];  Service: Cardiac Surgery    SKIN CANCER EXCISION      TONSILLECTOMY AND ADENOIDECTOMY           03/24/23 1237   OT Last Visit   OT Visit Date 03/24/23   Note Type   Note type Evaluation and Treatment   Additional Comments Greeted seated up in chair and agreeable to skilled OT evaluation  Pain Assessment   Pain Assessment Tool 0-10   Pain Score No Pain   Pain Location/Orientation Orientation: Right;Location: Groin   Restrictions/Precautions   Weight Bearing Precautions Per Order No   Other Precautions Fall Risk;Telemetry;Pain;Multiple lines   Home Living   Type of Home Apartment  (2nd fl apt )   Home Layout One level;Stairs to enter with rails; Performs ADLs on one level   Bathroom Shower/Tub Tub/shower unit   Bathroom Toilet Standard   Bathroom Equipment Grab bars in shower   [address] Walker;Cane   Prior Function   Level of [place] Independent with ADLs; Independent with functional mobility; Independent with IADLS   Lives With Alone   Receives Help From Family   IADLs Independent with driving; Independent with meal prep; Independent with medication management   Falls in the last 6 months 0   Vocational Retired   Comments Prior to admission, pt lives alone in a 2nd floor apt with 1 FOS to enter  Apt has a tub shower with grab bars, standard toilets  Pt reports he was I with ADLs, IADLs and mobility with no device  Owns a cane and RW  Niece lives in the apt on the first floor - they share cats  Niece works during the day  Drives, retired  Gardens  Reports 0 falls in the past 6 months  Lifestyle   Autonomy I with ADL, IADLs and mobility  Reciprocal Relationships Neice  ADL   Where Assessed Chair   Eating Assistance 7  Independent   Grooming Assistance 7  Independent   UB Bathing Assistance 6  Modified Independent   LB Bathing Assistance 5  Supervision/Setup   UB Dressing Assistance 6  Modified independent   LB Dressing Assistance 5  Supervision/Setup   Toileting Assistance  5  Supervision/Setup   Bed Mobility   Supine to Sit Unable to assess   Transfers   Sit to Stand 5  Supervision   Additional items Increased time required   Stand to Sit 5  Supervision   Additional items Increased time required   Functional Mobility   Functional Mobility 5  Supervision   Additional Comments household distances, RW     Additional items Rolling walker   Balance   Static Sitting Good   Dynamic Sitting Fair +   Static Standing Fair   Dynamic Standing Fair -   Ambulatory Fair -   Activity Tolerance   Activity Tolerance Patient tolerated treatment well   Medical Staff Made Aware PT Margaret   Nurse Made Aware TOMASZ Wheeler   RULUIS Assessment   RUE Assessment WFL   LUE Assessment   LUE Assessment WFL   Hand Function   Gross Motor Coordination Functional   Vision-Basic Assessment   Current Vision Wears glasses all the time   Psychosocial   Psychosocial (WDL) WDL   Cognition   Overall Cognitive Status [facility] Arousal/Participation Cooperative   Attention Within functional limits   Orientation Level Oriented X4   Memory Decreased short term memory   Following Commands Follows one step commands without difficulty   Cognition Assessment Tools MOCA   Score 27   Assessment   Limitation Decreased ADL status; Decreased UE strength;Decreased Safe judgement during ADL;Decreased endurance;Decreased cognition;Decreased self-care trans   Prognosis Good   Assessment Juarez Reza  is a [de-identified]y o  year old male who presents with history of hypertension, CAD, aortic stenosis status post TAVR, CKD, type 2 diabetes, CHF, hyperlipidemia, GERD, PAD, and ambulatory dysfunction  He was found to have right lower extremity ulcerations of midfoot and came in for elective femoral endarterectomy and distal SFA popliteal  Pt with active orders for OT and up with assist  Prior to admission, pt lives alone in a 2nd floor apt with 1 FOS to enter  Apt has a tub shower with grab bars, standard toilets  Pt reports he was I with ADLs, IADLs and mobility with no device  Owns a cane and RW  Niece lives in the apt on the first floor - they share cats  Niece works during the day  Drives, retired  Gardens  Reports 0 falls in the past 6 months  Upon evaluation, pt presenting below functional baseline with deficits noted in strength, activity tolerance, balance, safety awareness, cognition which is limiting pts functional ability to complete ADLs/ IADLs, functional transfers and functional mobility  Vitals: 121/57  Pt current level of function: bed mobility - unable to assess; transfers- supervision; functional mobility-supervision; UB dressing / bathing - supervision; LB dressing/ bathing- supervision; toileting - supervision  Pt would benefit from skilled OT 2-3x/wk to maximize functional independence  OT DC recommendation: home with home health services  MOCA completed with pt at recommendation of Deloris Nurse   Pt scored a 27/30-  Indicating no cog deficits  Goals   Patient Goals to go home asap  STG Time Frame 3-5   Short Term Goal #1 Pt will improve activity tolerance to G for min 30 min txment sessions for increase engagement in functional tasks   Short Term Goal #2 Pt will complete LB dressing/self care w/ mod I using adaptive device and DME as needed   Short Term Goal  Pt will complete toileting w/ mod I w/ G hygiene/thoroughness using DME as needed   LTG Time Frame 10-14   Long Term Goal #1 Pt will improve functional transfers to Mod I on/off all surfaces using DME as needed w/ G balance/safety   Long Term Goal #2 Pt will improve functional mobility during ADL/IADL/leisure tasks to Mod I using DME as needed w/ G balance/safety   Long Term Goal Pt will participate in simulated IADL management task to increase independence to Mod I w/ G safety and endurance   Plan   Treatment Interventions ADL retraining;Functional transfer training;UE strengthening/ROM; Cognitive reorientation;Patient/family training; Endurance training;Equipment evaluation/education; Neuromuscular reeducation; Compensatory technique education; Energy conservation; Activityengagement   Goal Expiration Date 04/07/23   OT Treatment Day 0   OT Frequency 2-3x/wk   Recommendation   OT Discharge Recommendation Home with home health rehabilitation   Additional Comments  The patient's raw score on the AM-PAC Daily Activity Inpatient Short Form is 20  A raw score of greater than or equal to 19 suggests the patient may benefit from discharge to home  Please refer to the recommendation of the Occupational Therapist for safe discharge planning     AM-PAC Daily Activity Inpatient   Lower Body Dressing 3   Bathing 3   Toileting 3   Upper Body Dressing 3   Grooming 4   Eating 4   Daily Activity Raw Score 20   Daily Activity Standardized Score (Calc for Raw Score >=11) 42 03   AM-[facility] Applied Cognition Inpatient   Following a Speech/Presentation 4   Understanding Ordinary Conversation 4   Taking Medications 4   Remembering Where Things Are Placed or Put Away 3   Remembering List of 4-5 Errands 3   Taking Care of Complicated Tasks 3   Applied Cognition Raw Score 21   Applied Cognition Standardized Score 44 3   MOCA   Version 8 1   Visuopatial/Executive 4   Naming 3   Memory 0   Attention: Digits 2   Attention: Letters 1   Attention: Serial 3   Language: Repeat 2   Language: Fluency 1   Abstraction 2   Delayed Recall 3   Orientation 6   Does patient have less than or equal to 12 years of education? 0   MOCA Total Score 27   MOCA Comments MOCA completed with pt at recommendation of [facility]  Pt scored a 27/30-  Indicating no cog deficits  Additional Treatment Session   Start Time 8282   End Time 3348   Treatment Assessment MOCA completed with pt at recommendation of [facility]  Pt scored a 27/30-  Indicating no cog deficits     Additional Treatment Day Gricelda 1, OT

## 2023-03-24 NOTE — PHYSICAL THERAPY NOTE
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)     Severe aortic stenosis     Squamous cell skin cancer 11/22/2022    left superior helix         Past Surgical History:   Procedure Laterality Date    APPENDECTOMY      CARDIAC CATHETERIZATION N/A 05/05/2022    Procedure: CARDIAC RHC/LHC; Surgeon: Chiquita Nguyen DO;  [facility]; Service: Cardiology    CARDIAC CATHETERIZATION N/A 05/05/2022    Procedure: Cardiac Coronary Angiogram;  Surgeon: Chiquita Nguyen DO;  [facility]; Service: Cardiology    CARDIAC CATHETERIZATION N/A 05/24/2022    Procedure: Cardiac pci;  Surgeon: Mayank Lu MD;  [facility];   Service: Cardiology    CARDIAC CATHETERIZATION N/A 06/14/2022    Procedure: CARDIAC TAVR;  Surgeon: Ezequiel Marroquin MD;  [facility] MA[facility];  Service: Cardiology    COLECTOMY      COLONOSCOPY  2013    CORONARY ARTERY BYPASS GRAFT  2013    X 2    FEMORAL ARTERY - POPLITEAL ARTERY BYPASS GRAFT      HEMORRHOID SURGERY      IR AORTAGRAM WITH RUN-OFF  11/19/2018    IR AORTAGRAM WITH RUN-OFF  3/2/2023    MOHS SURGERY Left 01/11/2023    SCC left superior helix-Dr Tovar [place] SLCTV CATHJ 3RD+ ORD SLCTV ABDL PEL/LXTR [facility] Left 08/12/2016    Procedure: LEFT FEMORAL ARTERIOGRAM; BALLOON ANGIOPLASTY; SFA  AND FEMORAL AT VEIN GRAFT;  Surgeon: Jason Villasenor MD;  Location: [facility] MAIN [facility];  Service: Vascular    [place] TRANSCATHETER TRANSAPICAL REPLACEMT AORTIC VALVE N/A 06/14/2022    Procedure: REPLACEMENT AORTIC VALVE TRANSCATHETER (TAVR) TRANSAPICAL 29MM IRVIN KYLER S3 ULTRA VALVE(ACCESS ON LEFT) ELANA;  Surgeon: Anel Anderson DO;  Location: [facility];  Service: Cardiac Surgery    SKIN CANCER EXCISION      TONSILLECTOMY AND ADENOIDECTOMY          03/24/23 1120   PT Last Visit   PT Visit Date 03/24/23   Note Type   Note type Evaluation  (and treatment )   Pain Assessment   Pain Rating: FLACC (Rest) - Face 0   Pain Rating: FLACC (Rest) - Legs 0   Pain Rating: FLACC (Rest) - Activity 0   Pain Rating: FLACC (Rest) - Cry 1   Pain Rating: FLACC (Rest) - Consolability 0   Score: FLACC (Rest) 1   Restrictions/Precautions   Weight Bearing Precautions Per Order No   Other Precautions Fall Risk;Telemetry;Multiple lines;Pain   Home Living   Type of Home Apartment  (2nd floor)   Home Layout One level;Stairs to enter with rails   Bathroom Shower/Tub Tub/shower unit   Bathroom Toilet Standard   Bathroom Equipment Grab bars in shower   Bathroom Accessibility Accessible   Home Equipment Walker;Cane  (not using)   Additional Comments resides alone in house with 2nd floor apt  Niece resides on 1st floor in her own apt of same home  Enjoys gardening  Still drives  Local brother and 2 sisters  Niece does work but home Tuesdays and Wednesdays  Prior Function   Level of [place] Independent with ADLs; Independent with functional mobility; Independent with IADLS   Lives With Alone   Receives Help From Family   IADLs Independent with driving; Independent with meal prep; Independent with medication management   Falls in the last 6 months 0  (denies)   Vocational Retired   Comments Reports independence without AD use prior to admission  General   Additional Pertinent History Pt is [de-identified] y/o male admitted for RLE femoral endarterectomy and distal SFA popliteal  PT consutled post operatively  Up with assist orders  Family/Caregiver Present No   Cognition   Overall Cognitive Status WFL   Arousal/Participation Alert   Orientation Level Oriented X4   Following Commands Follows one step commands without difficulty   Comments Some cues needed for safety related to line management with mobiltiy  Subjective   Subjective Wants to go home today! RUE Assessment   RUE Assessment WFL   LUE Assessment   LUE Assessment WFL   RLE Assessment   RLE Assessment WFL   Strength RLE   RLE Overall Strength 4-/5   LLE Assessment   LLE Assessment WFL   Strength LLE   LLE Overall Strength 4-/5   Vision-Basic Assessment   Current Vision Wears glasses all the time   Bed Mobility   Additional Comments received OOB standing at bedside attempting to use urinal    Transfers   Sit to Stand 5  Supervision   Additional items Increased time required;Verbal cues   Stand to Sit 5  Supervision   Additional items Increased time required;Verbal cues   Additional Comments Cues for hand placement   Ambulation/Elevation   Gait pattern Improper Weight shift;Decreased foot clearance; Inconsistent gayatri; Short stride   Gait Assistance 5  Supervision   Additional items Verbal cues   Assistive Device Rolling walker   Distance Amb with 'x1  Balance   Static Sitting Good   Dynamic Sitting Fair +   Static Standing Fair   Dynamic Standing Fair -   Ambulatory Fair -   Endurance Deficit   Endurance Deficit Yes   Endurance Deficit Description fatigue   Activity Tolerance   Activity Tolerance Patient tolerated treatment well;Patient limited by fatigue   Medical Staff Made Aware NurseRamiro   Nurse Made Aware yes   Assessment   Prognosis Good   Problem List Decreased strength;Decreased endurance; Impaired balance;Decreased mobility; Impaired judgement;Decreased safety awareness;Pain   Assessment Kayla Callahan  is a [de-identified]y o  year old male who presents with history of hypertension, CAD, aortic stenosis status post TAVR, CKD, type 2 diabetes, CHF, hyperlipidemia, GERD, PAD, and ambulatory dysfunction  He was found to have right lower extremity ulcerations of midfoot and came in for elective femoral endarterectomy and distal SFA popliteal  PT consulted post operatively  Up with assist orders  Prior to admission resides alone in apt  Independent with ADLS and ambulates without AD prior to admission  Denies falls and was driving  Currently presents with functional limitations related to post operative pain, decreased activity tolerance compared to baseline, decreased balance, functional mobility and locomotion requiring RW support  Transfers with S   Amb with RW with S   Gait mildly antalgic  Tolerated well with use of RW support  Given impairments with decline from PLOF will benefit from skilled PT in order to optimize functional outcomes  The patient's AM-PAC Basic Mobility Inpatient Short Form Raw Score is 22  A Raw score of greater than 16 suggests the patient may benefit from discharge to home  Please also refer to the recommendation of the Physical Therapist for safe discharge planning  At this time, anticipate abiltiy to return home with family support  HHPT mauricio recommended  Has RW  Barriers to Discharge Inaccessible home environment;Decreased caregiver support   Barriers to Discharge Comments lives alone, flight to apt   Goals   Patient Goals go home today  STG Expiration Date 03/31/23   Short Term Goal #1 7 days: 1)  Pt will perform bed mobility with Nicholas demonstrating appropriate technique 100% of the time in order to improve function  2)  Perform all transfers with Nicholas demonstrating safe and appropriate technique 100% of the time in order to improve ability to negotiate safely in home environment  3) Amb with least restrictive AD > 200'x1 with mod I in order to demonstrate ability to negotiate in home environment  4)  Improve overall strength and balance 1/2 grade in order to optimize ability to perform functional tasks and reduce fall risk  5) Increase activity tolerance to 45 minutes in order to improve endurance to functional tasks  6)  Negotiate stairs using most appropriate technique and S in order to be able to negotiate safely in home environment  7) PT for ongoing patient and family/caregiver education, DME needs and d/c planning in order to promote highest level of function in least restrictive environment  PT Treatment Day 1   Plan   Treatment/Interventions Functional transfer training;LE strengthening/ROM; Elevations; Therapeutic exercise; Endurance training;Patient/family training;Equipment eval/education; Bed mobility;Gait training; Compensatory technique education;Continued evaluation;Spoke to nursing   PT Frequency 3-5x/wk   Recommendation   PT Discharge Recommendation Home with home health rehabilitation   Equipment Recommended Walker  (pt has)   Oscar Erazo walker   AM-PAC Basic Mobility Inpatient   Turning in Flat Bed Without Bedrails 4   Lying on Back to Sitting on Edge of Flat Bed Without Bedrails 3   Moving Bed to Chair 4   Standing Up From Chair Using Arms 4   Walk in Room 4   Climb 3-5 Stairs With Railing 3   Basic Mobility Inpatient Raw Score 22   Basic Mobility Standardized Score 47 4   Highest Level Of Mobility   JH-HLM Goal 7: Walk 25 feet or more   JH-HLM Achieved 7: Walk 25 feet or more   Additional Treatment Session   Start Time 1140   End Time 1154   Treatment Assessment Transfers with S   Amb with RW 60'x2 with stair navigation in between  3 steps with single rail,step to pattern and S   Stand to sit with S   Remained OOB in chair  Equipment Use RW   Additional Treatment Day 1   End of Consult   Patient Position at End of Consult Bedside chair; All needs within reach     Hx/personal factors: co-morbidities, inaccessible home, dec caregiver support, home alone, advanced age, mutliple lines, telemetry, use of AD, pain, and fall risk  Examination: dec mobility, dec balance, dec endurance, dec amb, risk for falls, pain, assessed body system, balance, endurance, amb, D/C disposition & fall risk  Clinical: unpredictable (ongoing medical status, abnormal lab values, risk for falls, imaging test/result pending, and pain mgt)  Complexity: high           Shukri Paez, PT

## 2023-03-25 LAB
ANION GAP SERPL CALCULATED.3IONS-SCNC: 5 MMOL/L (ref 4–13)
BUN SERPL-MCNC: 41 MG/DL (ref 5–25)
CALCIUM SERPL-MCNC: 8.4 MG/DL (ref 8.4–10.2)
CHLORIDE SERPL-SCNC: 110 MMOL/L (ref 96–108)
CO2 SERPL-SCNC: 24 MMOL/L (ref 21–32)
CREAT SERPL-MCNC: 2 MG/DL (ref 0.6–1.3)
DME PARACHUTE DELIVERY DATE REQUESTED: NORMAL
DME PARACHUTE ITEM DESCRIPTION: NORMAL
DME PARACHUTE ORDER STATUS: NORMAL
DME PARACHUTE SUPPLIER NAME: NORMAL
DME PARACHUTE SUPPLIER PHONE: NORMAL
ERYTHROCYTE [DISTWIDTH] IN BLOOD BY AUTOMATED COUNT: 15.6 % (ref 11.6–15.1)
GFR SERPL CREATININE-BSD FRML MDRD: 30 ML/MIN/1.73SQ M
GLUCOSE SERPL-MCNC: 136 MG/DL (ref 65–140)
GLUCOSE SERPL-MCNC: 142 MG/DL (ref 65–140)
GLUCOSE SERPL-MCNC: 157 MG/DL (ref 65–140)
GLUCOSE SERPL-MCNC: 164 MG/DL (ref 65–140)
GLUCOSE SERPL-MCNC: 178 MG/DL (ref 65–140)
HCT VFR BLD AUTO: 22.1 % (ref 36.5–49.3)
HGB BLD-MCNC: 7 G/DL (ref 12–17)
INR PPP: 1.1 (ref 0.84–1.19)
MCH RBC QN AUTO: 29.7 PG (ref 26.8–34.3)
MCHC RBC AUTO-ENTMCNC: 31.7 G/DL (ref 31.4–37.4)
MCV RBC AUTO: 94 FL (ref 82–98)
PLATELET # BLD AUTO: 98 THOUSANDS/UL (ref 149–390)
PMV BLD AUTO: 11.1 FL (ref 8.9–12.7)
POTASSIUM SERPL-SCNC: 4.2 MMOL/L (ref 3.5–5.3)
PROTHROMBIN TIME: 14.2 SECONDS (ref 11.6–14.5)
RBC # BLD AUTO: 2.36 MILLION/UL (ref 3.88–5.62)
SODIUM SERPL-SCNC: 139 MMOL/L (ref 135–147)
WBC # BLD AUTO: 7.62 THOUSAND/UL (ref 4.31–10.16)

## 2023-03-25 RX ADMIN — HEPARIN SODIUM 5000 UNITS: 5000 INJECTION INTRAVENOUS; SUBCUTANEOUS at 22:17

## 2023-03-25 RX ADMIN — HEPARIN SODIUM 5000 UNITS: 5000 INJECTION INTRAVENOUS; SUBCUTANEOUS at 15:12

## 2023-03-25 RX ADMIN — PANCRELIPASE 24000 UNITS: 24000; 76000; 120000 CAPSULE, DELAYED RELEASE PELLETS ORAL at 12:07

## 2023-03-25 RX ADMIN — DOCUSATE SODIUM 100 MG: 100 CAPSULE, LIQUID FILLED ORAL at 08:37

## 2023-03-25 RX ADMIN — CLOPIDOGREL BISULFATE 75 MG: 75 TABLET, FILM COATED ORAL at 08:37

## 2023-03-25 RX ADMIN — INSULIN LISPRO 1 UNITS: 100 INJECTION, SOLUTION INTRAVENOUS; SUBCUTANEOUS at 08:15

## 2023-03-25 RX ADMIN — DOCUSATE SODIUM 100 MG: 100 CAPSULE, LIQUID FILLED ORAL at 17:24

## 2023-03-25 RX ADMIN — ATORVASTATIN CALCIUM 80 MG: 80 TABLET, FILM COATED ORAL at 17:24

## 2023-03-25 RX ADMIN — INSULIN LISPRO 1 UNITS: 100 INJECTION, SOLUTION INTRAVENOUS; SUBCUTANEOUS at 17:24

## 2023-03-25 RX ADMIN — PANCRELIPASE 24000 UNITS: 24000; 76000; 120000 CAPSULE, DELAYED RELEASE PELLETS ORAL at 08:14

## 2023-03-25 RX ADMIN — PANTOPRAZOLE SODIUM 40 MG: 40 TABLET, DELAYED RELEASE ORAL at 08:37

## 2023-03-25 RX ADMIN — INSULIN LISPRO 1 UNITS: 100 INJECTION, SOLUTION INTRAVENOUS; SUBCUTANEOUS at 12:07

## 2023-03-25 RX ADMIN — HEPARIN SODIUM 5000 UNITS: 5000 INJECTION INTRAVENOUS; SUBCUTANEOUS at 05:19

## 2023-03-25 RX ADMIN — ALLOPURINOL 300 MG: 300 TABLET ORAL at 08:37

## 2023-03-25 RX ADMIN — AMLODIPINE BESYLATE 10 MG: 10 TABLET ORAL at 08:37

## 2023-03-25 RX ADMIN — METOPROLOL SUCCINATE 12.5 MG: 25 TABLET, EXTENDED RELEASE ORAL at 08:37

## 2023-03-25 RX ADMIN — PANCRELIPASE 24000 UNITS: 24000; 76000; 120000 CAPSULE, DELAYED RELEASE PELLETS ORAL at 17:24

## 2023-03-25 RX ADMIN — ASPIRIN 81 MG: 81 TABLET, COATED ORAL at 08:37

## 2023-03-25 NOTE — PROGRESS NOTES
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 ([facility])  Assessment & Plan  · Right lower extremity PAD status post right CFA endarterectomy with angioplasty  · Currently doing well and pain controlled  · Remains on DAPT for underlying CAD  · Medically stable for discharge if okay with primary service  Anemia due to stage 4 chronic kidney disease ([facility] [address] )  Assessment & Plan  · Did have blood loss anemia requiring 2 units PRBC  · Hemodynamically stable  · If remains in the hospital with check hemoglobin in a m  Results from last 7 days   Lab Units 03/24/23  0506 03/23/23  1333 03/23/23  1052 03/23/23  0932   HEMOGLOBIN g/dL 8 2* 9 3*  --   --    I STAT HEMOGLOBIN g/dl  --   --  8 5* 9 2*       Benign hypertension with chronic kidney disease, stage IV ([facility])  Assessment & Plan  · Primary hypertension  Vitals stable  Continue amlodipine  · CKD 4  Follows with Dr Kenneth Hennessy as an outpatient  Renal function at baseline  Results from last 7 days   Lab Units 03/24/23  0506 03/23/23  1333   BUN mg/dL 39* 36*   CREATININE mg/dL 1 96* 2 07*   EGFR ml/min/1 73sq m 31 29       Controlled type 2 diabetes mellitus with stage 4 chronic kidney disease, without long-term current use of insulin ([facility])  Assessment & Plan  Lab Results   Component Value Date    HGBA1C 7 6 (H) 02/10/2023     Recent Labs     03/24/23  1038 03/24/23  1616 03/25/23  0749 03/25/23  1124   POCGLU 238* 157* 164* 157*     · Continue sliding-scale insulin    CAD (coronary artery disease)  Assessment & Plan  · CAD with history of CABG and subsequent PCI in 2022  No chest pain    · Continue aspirin and clopidogrel           VTE Pharmacologic Prophylaxis: VTE Score: 5 High Risk (Score >/= 5) - "Pharmacological DVT Prophylaxis Ordered: heparin  Sequential Compression Devices Ordered  Patient Centered Rounds: I have performed bedside rounds with nursing staff today  Discussions with Specialists or Other Care Team Provider: Case management and surgical resident    Education and Discussions with Family / Patient:     Time Spent for Care: This time was spent on one or more of the following: performing physical exam; counseling and coordination of care; obtaining or reviewing history; documenting in the medical record; reviewing/ordering tests, medications or procedures; communicating with other healthcare professionals and discussing with patient's family/caregivers  Current Length of Stay: 2 day(s)  Current Patient Status: Inpatient   Certification Statement:   Discharge Plan: Cyn Lizama is following this patient on consult  They are medically stable for discharge when deemed appropriate by primary service  Code Status: Level 1 - Full Code      Subjective:   Patient seen and examined  No new complaints  Pain controlled  Objective:   Vitals: Blood pressure (!) 141/41, pulse 67, temperature 97 8 °F (36 6 °C), temperature source Oral, resp  rate 16, height 5' 10\" (1 778 m), weight 82 1 kg (181 lb), SpO2 98 %  Intake/Output Summary (Last 24 hours) at 3/25/2023 1121  Last data filed at 3/25/2023 0740  Gross per 24 hour   Intake 300 ml   Output 100 ml   Net 200 ml       Physical Exam  Vitals reviewed  Constitutional:       General: He is not in acute distress  HENT:      Head: Atraumatic  Eyes:      Extraocular Movements: Extraocular movements intact  Pupils: Pupils are equal, round, and reactive to light  Cardiovascular:      Rate and Rhythm: Regular rhythm  Heart sounds: Normal heart sounds  Pulmonary:      Effort: Pulmonary effort is normal       Breath sounds: Decreased breath sounds present  No wheezing     Abdominal:      General: Bowel sounds are normal       Palpations: " Abdomen is soft  Tenderness: There is no abdominal tenderness  There is no rebound  Musculoskeletal:         General: No swelling or tenderness  Skin:     General: Skin is warm and dry  Neurological:      General: No focal deficit present  Mental Status: He is alert and oriented to person, place, and time  Cranial Nerves: No cranial nerve deficit  Psychiatric:         Mood and Affect: Mood normal        Additional Data:   Labs:  Results from last 7 days   Lab Units 03/24/23  0506 03/23/23  1333 03/23/23  1052   WBC Thousand/uL 9 78 9 55  --    HEMOGLOBIN g/dL 8 2* 9 3*  --    I STAT HEMOGLOBIN g/dl  --   --  8 5*   PLATELETS Thousands/uL 110* 123*  --    MCV fL 92 93  --    INR   --  1 18  --      Results from last 7 days   Lab Units 03/24/23  0506 03/23/23  1333 03/23/23  1052   SODIUM mmol/L 138 141  --    POTASSIUM mmol/L 4 4 4 7  --    CHLORIDE mmol/L 110* 112*  --    CO2 mmol/L 22 23  --    CO2, I-STAT mmol/L  --   --  25   ANION GAP mmol/L 6 6  --    BUN mg/dL 39* 36*  --    CREATININE mg/dL 1 96* 2 07*  --    CALCIUM mg/dL 8 2* 8 0*  --    EGFR ml/min/1 73sq m 31 29  --    GLUCOSE RANDOM mg/dL 183* 187*  --      Results from last 7 days   Lab Units 03/24/23  0506   MAGNESIUM mg/dL 1 8*   PHOSPHORUS mg/dL 3 4                      Results from last 7 days   Lab Units 03/25/23  0749 03/24/23  1616 03/24/23  1038 03/24/23  0722 03/23/23  1741 03/23/23  1528 03/23/23  1323 03/23/23  0637   POC GLUCOSE mg/dl 164* 157* 238* 184* 204* 186* 174* 165*             * I Have Reviewed All Lab Data Listed Above  Cultures:   Results from last 7 days   Lab Units 03/23/23  0616   INFLUENZA A PCR  Negative       Results from last 7 days   Lab Units 03/23/23  0616   SARS-COV-2  Negative   INFLUENZA A PCR  Negative   INFLUENZA B PCR  Negative   RSV PCR  Negative           Lines/Drains:  Invasive Devices     Peripheral Intravenous Line  Duration           Peripheral IV 03/23/23 Dorsal (posterior); Right Hand 2 days              Telemetry:      Imaging:  Imaging Reports Reviewed Today Include:   No results found  Scheduled Meds:  Current Facility-Administered Medications   Medication Dose Route Frequency Provider Last Rate   • acetaminophen  650 mg Oral Q6H PRN Sissy Pare, CRNP     • allopurinol  300 mg Oral Daily Sissy Pare, CRNP     • amLODIPine  10 mg Oral Daily Sissy Pare, CRNP     • aspirin  81 mg Oral Daily Sissy Pare, CRNP     • atorvastatin  80 mg Oral Daily With Dinner Sissy Pare, CRNP     • clopidogrel  75 mg Oral Daily Sissy Pare, CRNP     • docusate sodium  100 mg Oral BID Sissy Pare, CRNP     • heparin (porcine)  5,000 Units Subcutaneous Q8H [address] Sissy Pare, CRNP     • insulin lispro  1-6 Units Subcutaneous TID AC Sissy Pare, CRNP     • metoprolol succinate  12 5 mg Oral Daily Sissy Pare, CRNP     • morphine injection  2 mg Intravenous Q3H PRN Sissy Pare, CRNP     • ondansetron  4 mg Intravenous Q6H PRN Sissy Pare, CRNP     • oxyCODONE  10 mg Oral Q4H PRN Sissy Pare, CRNP     • oxyCODONE  5 mg Oral Q4H PRN Sissy Pare, CRNP     • pancrelipase (Lip-Prot-Amyl)  24,000 Units Oral TID With Meals Sissy Pare, CRNP     • pantoprazole  40 mg Oral Daily Sissy Pare, CRNP     • polyethylene glycol  17 g Oral Daily PRN Sissy Pare, CRNP         Today, Patient Was Seen By: Clara Downey DO    ** Please Note: Dictation voice to text software may have been used in the creation of this document   **

## 2023-03-25 NOTE — ASSESSMENT & PLAN NOTE
· CAD with history of CABG and subsequent PCI in 2022  No chest pain    · Continue aspirin and clopidogrel

## 2023-03-25 NOTE — ASSESSMENT & PLAN NOTE
· Did have blood loss anemia requiring 2 units PRBC  · Hemodynamically stable  · If remains in the hospital with check hemoglobin in a m      Results from last 7 days   Lab Units 03/24/23  0506 03/23/23  1333 03/23/23  1052 03/23/23  0932   HEMOGLOBIN g/dL 8 2* 9 3*  --   --    I STAT HEMOGLOBIN g/dl  --   --  8 5* 9 2*

## 2023-03-25 NOTE — ASSESSMENT & PLAN NOTE
· Right lower extremity PAD status post right CFA endarterectomy with angioplasty  · Currently doing well and pain controlled  · Remains on DAPT for underlying CAD  · Medically stable for discharge if okay with primary service

## 2023-03-25 NOTE — ASSESSMENT & PLAN NOTE
Lab Results   Component Value Date    HGBA1C 7 6 (H) 02/10/2023     Recent Labs     03/24/23  1038 03/24/23  1616 03/25/23  0749 03/25/23  1124   POCGLU 238* 157* 164* 157*     · Continue sliding-scale insulin

## 2023-03-25 NOTE — CASE MANAGEMENT
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 ([place] [address] ) 2023   • Anemia due to stage 4 chronic kidney disease ([place] [address] ) 2023   • Hyperuricemia 2023   • Benign hypertension with chronic kidney disease, stage III ([place] [address] ) 2022   • Secondary hyperparathyroidism of renal origin ([place] [address] ) 2022   • S/P TAVR (transcatheter aortic valve replacement) 2022   • Elevated serum creatinine 2022   • Chronic diastolic CHF (congestive heart failure) ([place] [address] ) 2022   • Acute kidney injury superimposed on chronic kidney disease ([place] [address] ) 2022   • Iron deficiency anemia 2022   • Persistent proteinuria, unspecified 01/10/2022   • Benign hypertension with chronic kidney disease, stage IV ([place] [address] ) 01/10/2022   • Leukopenia 01/10/2022   • Hypomagnesemia 01/10/2022   • Renal cyst, left 01/10/2022   • GERD (gastroesophageal reflux disease)    • Hyperlipidemia    • Controlled type 2 diabetes mellitus with stage 4 chronic kidney disease, without long-term current use of insulin ([place] [address] ) 2021   • Sinus bradycardia 10/08/2020   • Calcific tendinitis of right shoulder region 2019   • Right shoulder pain 2019   • Cigarette nicotine dependence with nicotine-induced disorder 10/22/2018   • Stenosis of noncoronary bypass graft ([facility] ) 2016   • Asymptomatic bilateral carotid artery stenosis 2016   • S/P CABG (coronary artery bypass graft) 2016   • Hypertension 2016   • Aorto-iliac disease ([place] [address] ) 2016   • Renal artery stenosis, native, bilateral ([address] [facility] ) 2016 • CAD (coronary artery disease) 08/12/2016   • Progressive angina ([facility] [address] ) 08/12/2016   • Atherosclerosis of native arteries of extremities with intermittent claudication, bilateral legs ([facility])    • PVD (peripheral vascular disease) ([facility] [address] )    • Tension headache 10/24/2013      LOS (days): 2  Geometric Mean LOS (GMLOS) (days): 3 90  Days to GMLOS:1 7     OBJECTIVE:  Risk of Unplanned Readmission Score: 25 67         Current admission status: Inpatient   Preferred Pharmacy:   [facility] [address]  Phone: [phone] Fax: [facility] - [address]   [phone]   Phone: [phone] Fax: [phone]    Primary Care Provider: Reinaldo Manning MD    Primary Insurance: MEDICARE  Secondary Insurance: COMMERCIAL MISCELLANEOUS    DISCHARGE DETAILS:    Discharge planning discussed with[de-identified] Patient        CM contacted family/caregiver?: No- see comments (pt declined call to update family)                  [address] [place] [address]         Is the patient interested in [address] at discharge?: Yes  [address] requested[de-identified] Nursing, Occupational Therapy, Physical [address] Name[de-identified] [facility] Provider[de-identified] PCP  [address] Needed[de-identified] Wound/Ostomy Care, Evaluate Functional Status and Safety, Strengthening/Theraputic Exercises to Improve Function  Homebound Criteria Met[de-identified] Uses an Assist Device (i e  cane, walker, etc)  Supporting Clincal Findings[de-identified] Limited Endurance    DME Referral Provided  Referral made for DME?: Yes  DME referral completed for the following items[de-identified] Edel Arreaga  DME Supplier Name[de-identified] [employer]    Other Referral/Resources/Interventions Provided:  Interventions: DME, HHC  Referral Comments: Chucky Trotter reserved   Order for walker placed via [employer]         Treatment Team Recommendation: Home with [facility]  Discharge Destination Plan[de-identified] Home with [place] at Discharge : Family                                      Additional Comments: CM met with pt at bedside  CM reviewed choice list for [place]  Pt requesting walker  Order placed and pending  Pt confirmed he will have transport home upon d/c

## 2023-03-25 NOTE — DISCHARGE INSTR - AVS FIRST PAGE
DISCHARGE INSTRUCTIONS  LEG/BYPASS SURGERY    ACTIVITY:   Limit your physical activity to walking for the first week and then increase your activity as tolerated  If you become short of breath or tired, stop and rest   You may require help with walking or feel more secure with something to lean on  Walking up steps and normal activities may be resumed as you feel ready  Most people tire easily for the first few weeks following leg surgery  This improves as conditioning returns  Avoid strenuous activity such as vigorous exercise  Avoid heavy lifting (do not lift more than 15 pounds) for the first four weeks after surgery  You should not drive a car for at least two weeks following discharge from the hospital and you are off all narcotic pain medication  You may ride in a car  DIET:  Resume your normal diet  Good nutrition is important for healing of your incision  If you are discharged on narcotics for pain control, continue taking your stool softeners until you are having regular bowel movements  INCISION:  You should shower daily  Wash incision daily with soap and water, but do not rub or scrub the incision; rinse thoroughly and pat dry  You may have stitches or staples to close your incision and it is okay for these to get wet  Do not bathe in a tub or swim for the first 4 week following surgery or if you have any open wounds  It is normal to have swelling or discoloration around the incision  If increasing redness or pain develops, call our office immediately  Numbness in the region of the incision may occur following the surgery  This normally improves over six to twelve months  You may have surgical glue over your incisions  There are stitches present under the skin which will absorb on their own  The glue is used to cover the access, assist in closure, and prevent contamination  This adhesive will darken and peel away on its own within one to two weeks  Do not pick at it      If you have a groin wound/incision, place a clean dry piece of gauze to cover your groin incision to keep incision clean and dry and prevent your skin from sticking together  Change gauze daily  You may have staples or stitches at your incisions  These will be removed at your follow-up appointment or when they are ready to come out  If you have a dressing over your surgical site, remove this on the second day after surgery  If you have foot or leg wounds, please follow your podiatrist/wound care doctor's instructions for care  If any of your incisions are open and require dressing changes, you will be given instructions for your daily incision care  If you are not able to change the dressings, a visiting nurse will be arranged  DO NOT put any powders, creams, ointments, or lotions on your incision  LEG SWELLING: Most patients have noticeable leg swelling after leg surgery  This usually improves within a few weeks  If swelling is present, elevate the leg whenever possible  Avoid sitting with the leg hanging down for prolonged periods of time  Walking is beneficial   An ACE bandage or support stocking may be helpful, but this should be discussed with your physician prior to use if you have a bypass  FOLLOW UP STUDIES:  Doppler ultrasound studies are very important for long-term management  Your surgeon will arrange this at your first postoperative visit  Repeat studies are then scheduled every three months for the first year and periodically after this  FOLLOW UP APPOINTMENTS:  Making and keeping follow up appointments and ultrasound tests are important to your recovery  If you have difficulty making it to or keeping your follow up appointments, call the office  If you have increased pain, fever >101 5, increased drainage, redness or a bad smell at your surgery site, new coldness/numbness of your arm or leg, please call us immediately and GO directly to the ER      PLEASE CALL THE OFFICE IF YOU HAVE ANY QUESTIONS  [phone]  [phone]  [address] [facility] , [address], [place], [address]  [address], [address], [place], [address]  [address], [facility], [facility]  [address] [facility], [facility], [facility], [address]    [address], [address]st [address], [address]  [facility], [address] [facility], [address], [facility], [address] [facility]  [address] [facility], [address] [facility], [place], [address] [facility]  [facility], [address] [facility], [address] [facility], Kimberly Levy 6  [address] [facility], [place], [facility], Babita Foy, [address]

## 2023-03-25 NOTE — PROGRESS NOTES
"Progress Note - Vascular Surgery   Leena Setting  [de-identified] y o  male MRN: [id number]  Unit/Bed#: [place] 2 -02 Encounter: [id number]    Assessment:  [de-identified] w RLE PAD s/p R CFA endarterectomy, bovine patch angioplasty w/antegrade SFA/pop PTA/shockwave angioplasty on 3/23    Vitals stable, afebrile  Labs pending        Plan:  -Cardiac diet, CCD2, sodium restriction  -ASA81/Plavix/statin  -SSI  -SQH  -Pain control  -Encourage ambulation, per PT/OT home health rehab  -Dispo planning    Subjective/Objective   Subjective:   Doing well, ambulating without issues, tolerating diet, voiding without issues  Objective:     Blood pressure (!) 141/41, pulse 67, temperature 97 8 °F (36 6 °C), temperature source Oral, resp  rate 16, height 5' 10\" (1 778 m), weight 82 1 kg (181 lb), SpO2 98 %  ,Body mass index is 25 97 kg/m²  Intake/Output Summary (Last 24 hours) at 3/25/2023 1001  Last data filed at 3/25/2023 0740  Gross per 24 hour   Intake 300 ml   Output 100 ml   Net 200 ml       Invasive Devices     Peripheral Intravenous Line  Duration           Peripheral IV 03/23/23 Dorsal (posterior); Right Hand 2 days                Physical Exam:  General: NAD   Skin: Warm, dry, anicteric  HEENT: Normocephalic, atraumatic  CV: RRR, no m/r/g  Pulm: CTA b/l, no inc WOB  Abd: Soft, ND/NT  MSK: Symmetric, no edema, no tenderness, no deformity  Neuro: AOx3, GCS 15    Lab, Imaging and other studies:I have personally reviewed pertinent lab results    , CBC: No results found for: WBC, HGB, HCT, MCV, PLT, ADJUSTEDWBC, MCH, MCHC, RDW, MPV, NRBC, CMP: No results found for: SODIUM, K, CL, CO2, ANIONGAP, BUN, CREATININE, GLUCOSE, CALCIUM, AST, ALT, ALKPHOS, PROT, BILITOT, EGFR  VTE Pharmacologic Prophylaxis: Sequential compression device (Venodyne)  and Heparin  VTE Mechanical Prophylaxis: sequential compression device    "

## 2023-03-25 NOTE — PLAN OF CARE
Problem: MOBILITY - ADULT  Goal: Maintain or return to baseline ADL function  Description: INTERVENTIONS:  -  Assess patient's ability to carry out ADLs; assess patient's baseline for ADL function and identify physical deficits which impact ability to perform ADLs (bathing, care of mouth/teeth, toileting, grooming, dressing, etc )  - Assess/evaluate cause of self-care deficits   - Assess range of motion  - Assess patient's mobility; develop plan if impaired  - Assess patient's need for assistive devices and provide as appropriate  - Encourage maximum independence but intervene and supervise when necessary  - Involve family in performance of ADLs  - Assess for home care needs following discharge   - Consider OT consult to assist with ADL evaluation and planning for discharge  - Provide patient education as appropriate  Outcome: Progressing  Goal: Maintains/Returns to pre admission functional level  Description: INTERVENTIONS:  - Perform BMAT or MOVE assessment daily    - Set and communicate daily mobility goal to care team and patient/family/caregiver  - Collaborate with rehabilitation services on mobility goals if consulted  - Perform Range of Motion 3 times a day  - Reposition patient every 2 hours    - Dangle patient 3 times a day  - Stand patient 3 times a day  - Ambulate patient 3 times a day  - Out of bed to chair 3 times a day   - Out of bed for meals 3 times a day  - Out of bed for toileting  - Record patient progress and toleration of activity level   Outcome: Progressing     Problem: PAIN - ADULT  Goal: Verbalizes/displays adequate comfort level or baseline comfort level  Description: Interventions:  - Encourage patient to monitor pain and request assistance  - Assess pain using appropriate pain scale  - Administer analgesics based on type and severity of pain and evaluate response  - Implement non-pharmacological measures as appropriate and evaluate response  - Consider cultural and social influences on pain and pain management  - Notify physician/advanced practitioner if interventions unsuccessful or patient reports new pain  Outcome: Progressing     Problem: SAFETY ADULT  Goal: Maintain or return to baseline ADL function  Description: INTERVENTIONS:  -  Assess patient's ability to carry out ADLs; assess patient's baseline for ADL function and identify physical deficits which impact ability to perform ADLs (bathing, care of mouth/teeth, toileting, grooming, dressing, etc )  - Assess/evaluate cause of self-care deficits   - Assess range of motion  - Assess patient's mobility; develop plan if impaired  - Assess patient's need for assistive devices and provide as appropriate  - Encourage maximum independence but intervene and supervise when necessary  - Involve family in performance of ADLs  - Assess for home care needs following discharge   - Consider OT consult to assist with ADL evaluation and planning for discharge  - Provide patient education as appropriate  Outcome: Progressing  Goal: Maintains/Returns to pre admission functional level  Description: INTERVENTIONS:  - Perform BMAT or MOVE assessment daily    - Set and communicate daily mobility goal to care team and patient/family/caregiver  - Collaborate with rehabilitation services on mobility goals if consulted  - Perform Range of Motion 3 times a day  - Reposition patient every 2 hours    - Dangle patient 3 times a day  - Stand patient 3 times a day  - Ambulate patient 3 times a day  - Out of bed to chair 3 times a day   - Out of bed for meals 3 times a day  - Out of bed for toileting  - Record patient progress and toleration of activity level   Outcome: Progressing  Goal: Patient will remain free of falls  Description: INTERVENTIONS:  - Educate patient/family on patient safety including physical limitations  - Instruct patient to call for assistance with activity   - Consult OT/PT to assist with strengthening/mobility   - Keep Call bell within reach  - Keep bed low and locked with side rails adjusted as appropriate  - Keep care items and personal belongings within reach  - Initiate and maintain comfort rounds  - Make Fall Risk Sign visible to staff  - Offer Toileting every 2 Hours, in advance of need  - Initiate/Maintain alarm  - Obtain necessary fall risk management equipment  - Apply yellow socks and bracelet for high fall risk patients  - Consider moving patient to room near nurses station  Outcome: Progressing     Problem: DISCHARGE PLANNING  Goal: Discharge to home or other facility with appropriate resources  Description: INTERVENTIONS:  - Identify barriers to discharge w/patient and caregiver  - Arrange for needed discharge resources and transportation as appropriate  - Identify discharge learning needs (meds, wound care, etc )  - Arrange for interpretive services to assist at discharge as needed  - Refer to Case Management Department for coordinating discharge planning if the patient needs post-hospital services based on physician/advanced practitioner order or complex needs related to functional status, cognitive ability, or social support system  Outcome: Progressing     Problem: Knowledge Deficit  Goal: Patient/family/caregiver demonstrates understanding of disease process, treatment plan, medications, and discharge instructions  Description: Complete learning assessment and assess knowledge base    Interventions:  - Provide teaching at level of understanding  - Provide teaching via preferred learning methods  Outcome: Progressing     Problem: SKIN/TISSUE INTEGRITY - ADULT  Goal: Skin Integrity remains intact(Skin Breakdown Prevention)  Description: Assess:  -Perform [place] assessment  -Clean and moisturize skin  -Inspect skin when repositioning, toileting, and assisting with ADLS  -Assess under medical devices   -Assess extremities for adequate circulation and sensation     Bed Management:  -Have minimal linens on bed & keep smooth, unwrinkled  -Change linens as needed when moist or perspiring  -Avoid sitting or lying in one position for more than 2 hours while in bed  -Keep HOB at 45 degrees     Toileting:  -Offer bedside commode  -Assess for incontinence   -Use incontinent care products after each incontinent episode     Activity:  -Mobilize patient 3 times a day  -Encourage activity and walks on unit  -Encourage or provide ROM exercises   -Turn and reposition patient every 2 Hours  -Use appropriate equipment to lift or move patient in bed  -Instruct/ Assist with weight shifting every 2 when out of bed in chair  -Consider limitation of chair time 2 hour intervals    Skin Care:  -Avoid use of baby powder, tape, friction and shearing, hot water or constrictive clothing  -Relieve pressure over bony prominences  -Do not massage red bony areas    Next Steps:  -Teach patient strategies to minimize risks   -Consider consults to  interdisciplinary teams   Outcome: Progressing  Goal: Incision(s), wounds(s) or drain site(s) healing without S/S of infection  Description: INTERVENTIONS  - Assess and document dressing, incision, wound bed, drain sites and surrounding tissue  - Provide patient and family education  - Perform skin care/dressing changes   Outcome: Progressing  Goal: Pressure injury heals and does not worsen  Description: Interventions:  - Implement low air loss mattress or specialty surface (Criteria met)  - Apply silicone foam dressing  - Instruct/assist with weight shifting every 30 minutes when in chair   - Limit chair time to 2 hour intervals  - Use special pressure reducing interventions when in chair   - Apply fecal or urinary incontinence containment device   - Perform passive or active ROM   - Turn and reposition patient & offload bony prominences every 2 hours   - Utilize friction reducing device or surface for transfers   - Consider consults to  interdisciplinary teams   - Use incontinent care products after each incontinent episode  - Consider nutrition services referral as needed  Outcome: Progressing     Problem: HEMATOLOGIC - ADULT  Goal: Maintains hematologic stability  Description: INTERVENTIONS  - Assess for signs and symptoms of bleeding or hemorrhage  - Monitor labs  - Administer supportive blood products/factors as ordered and appropriate  Outcome: Progressing     Problem: NEUROSENSORY - ADULT  Goal: Achieves maximal functionality and self care  Description: INTERVENTIONS  - Monitor swallowing and airway patency with patient fatigue and changes in neurological status  - Encourage and assist patient to increase activity and self care     - Encourage visually impaired, hearing impaired and aphasic patients to use assistive/communication devices  Outcome: Progressing     Problem: METABOLIC, FLUID AND ELECTROLYTES - ADULT  Goal: Fluid balance maintained  Description: INTERVENTIONS:  - Monitor labs   - Monitor I/O and WT  - Instruct patient on fluid and nutrition as appropriate  - Assess for signs & symptoms of volume excess or deficit  Outcome: Progressing  Goal: Glucose maintained within target range  Description: INTERVENTIONS:  - Monitor Blood Glucose as ordered  - Assess for signs and symptoms of hyperglycemia and hypoglycemia  - Administer ordered medications to maintain glucose within target range  - Assess nutritional intake and initiate nutrition service referral as needed  Outcome: Progressing     Problem: MUSCULOSKELETAL - ADULT  Goal: Maintain or return mobility to safest level of function  Description: INTERVENTIONS:  - Assess patient's ability to carry out ADLs; assess patient's baseline for ADL function and identify physical deficits which impact ability to perform ADLs (bathing, care of mouth/teeth, toileting, grooming, dressing, etc )  - Assess/evaluate cause of self-care deficits   - Assess range of motion  - Assess patient's mobility  - Assess patient's need for assistive devices and provide as appropriate  - Encourage maximum independence but intervene and supervise when necessary  - Involve family in performance of ADLs  - Assess for home care needs following discharge   - Consider OT consult to assist with ADL evaluation and planning for discharge  - Provide patient education as appropriate  Outcome: Progressing     Problem: INFECTION - ADULT  Goal: Absence or prevention of progression during hospitalization  Description: INTERVENTIONS:  - Assess and monitor for signs and symptoms of infection  - Monitor lab/diagnostic results  - Monitor all insertion sites, i e  indwelling lines, tubes, and drains  - Monitor endotracheal if appropriate and nasal secretions for changes in amount and color  - [place] appropriate cooling/warming therapies per order  - Administer medications as ordered  - Instruct and encourage patient and family to use good hand hygiene technique  - Identify and instruct in appropriate isolation precautions for identified infection/condition  Outcome: Completed  Goal: Absence of fever/infection during neutropenic period  Description: INTERVENTIONS:  - Monitor WBC    Outcome: Completed

## 2023-03-25 NOTE — RESTORATIVE TECHNICIAN NOTE
Restorative Technician Note      Patient Name: Yamileth Garcia  Restorative Tech Visit Date: 03/25/23  Note Type: Mobility  Patient Position Upon Consult: Supine  Activity Performed: Ambulated  Assistive Device: Roller walker  Patient Position at End of Consult: Supine;  All needs within reach; Bed/Chair alarm activated    ns

## 2023-03-25 NOTE — ASSESSMENT & PLAN NOTE
· Primary hypertension  Vitals stable  Continue amlodipine  · CKD 4  Follows with Dr Kenneth Hennessy as an outpatient  Renal function at baseline      Results from last 7 days   Lab Units 03/24/23  0506 03/23/23  1333   BUN mg/dL 39* 36*   CREATININE mg/dL 1 96* 2 07*   EGFR ml/min/1 73sq m 31 29

## 2023-03-26 LAB
ANION GAP SERPL CALCULATED.3IONS-SCNC: 5 MMOL/L (ref 4–13)
BASOPHILS # BLD AUTO: 0.01 THOUSANDS/ÂΜL (ref 0–0.1)
BASOPHILS NFR BLD AUTO: 0 % (ref 0–1)
BUN SERPL-MCNC: 39 MG/DL (ref 5–25)
CALCIUM SERPL-MCNC: 8.2 MG/DL (ref 8.4–10.2)
CHLORIDE SERPL-SCNC: 112 MMOL/L (ref 96–108)
CO2 SERPL-SCNC: 23 MMOL/L (ref 21–32)
CREAT SERPL-MCNC: 1.84 MG/DL (ref 0.6–1.3)
EOSINOPHIL # BLD AUTO: 0.11 THOUSAND/ÂΜL (ref 0–0.61)
EOSINOPHIL NFR BLD AUTO: 2 % (ref 0–6)
ERYTHROCYTE [DISTWIDTH] IN BLOOD BY AUTOMATED COUNT: 15.2 % (ref 11.6–15.1)
GFR SERPL CREATININE-BSD FRML MDRD: 33 ML/MIN/1.73SQ M
GLUCOSE SERPL-MCNC: 119 MG/DL (ref 65–140)
GLUCOSE SERPL-MCNC: 141 MG/DL (ref 65–140)
GLUCOSE SERPL-MCNC: 143 MG/DL (ref 65–140)
GLUCOSE SERPL-MCNC: 198 MG/DL (ref 65–140)
GLUCOSE SERPL-MCNC: 201 MG/DL (ref 65–140)
HCT VFR BLD AUTO: 20.5 % (ref 36.5–49.3)
HCT VFR BLD AUTO: 26.9 % (ref 36.5–49.3)
HGB BLD-MCNC: 6.5 G/DL (ref 12–17)
HGB BLD-MCNC: 8.6 G/DL (ref 12–17)
IMM GRANULOCYTES # BLD AUTO: 0.04 THOUSAND/UL (ref 0–0.2)
IMM GRANULOCYTES NFR BLD AUTO: 1 % (ref 0–2)
LYMPHOCYTES # BLD AUTO: 1.12 THOUSANDS/ÂΜL (ref 0.6–4.47)
LYMPHOCYTES NFR BLD AUTO: 18 % (ref 14–44)
MCH RBC QN AUTO: 29.4 PG (ref 26.8–34.3)
MCHC RBC AUTO-ENTMCNC: 31.2 G/DL (ref 31.4–37.4)
MCV RBC AUTO: 94 FL (ref 82–98)
MONOCYTES # BLD AUTO: 0.57 THOUSAND/ÂΜL (ref 0.17–1.22)
MONOCYTES NFR BLD AUTO: 9 % (ref 4–12)
NEUTROPHILS # BLD AUTO: 4.31 THOUSANDS/ÂΜL (ref 1.85–7.62)
NEUTS SEG NFR BLD AUTO: 70 % (ref 43–75)
NRBC BLD AUTO-RTO: 0 /100 WBCS
PLATELET # BLD AUTO: 100 THOUSANDS/UL (ref 149–390)
PMV BLD AUTO: 11.4 FL (ref 8.9–12.7)
POTASSIUM SERPL-SCNC: 3.7 MMOL/L (ref 3.5–5.3)
RBC # BLD AUTO: 2.18 MILLION/UL (ref 3.88–5.62)
SODIUM SERPL-SCNC: 140 MMOL/L (ref 135–147)
WBC # BLD AUTO: 6.16 THOUSAND/UL (ref 4.31–10.16)

## 2023-03-26 RX ORDER — POTASSIUM CHLORIDE 20 MEQ/1
40 TABLET, EXTENDED RELEASE ORAL ONCE
Status: COMPLETED | OUTPATIENT
Start: 2023-03-26 | End: 2023-03-26

## 2023-03-26 RX ADMIN — PANTOPRAZOLE SODIUM 40 MG: 40 TABLET, DELAYED RELEASE ORAL at 08:06

## 2023-03-26 RX ADMIN — INSULIN LISPRO 2 UNITS: 100 INJECTION, SOLUTION INTRAVENOUS; SUBCUTANEOUS at 12:32

## 2023-03-26 RX ADMIN — AMLODIPINE BESYLATE 10 MG: 10 TABLET ORAL at 08:06

## 2023-03-26 RX ADMIN — PANCRELIPASE 24000 UNITS: 24000; 76000; 120000 CAPSULE, DELAYED RELEASE PELLETS ORAL at 08:06

## 2023-03-26 RX ADMIN — DOCUSATE SODIUM 100 MG: 100 CAPSULE, LIQUID FILLED ORAL at 08:06

## 2023-03-26 RX ADMIN — ALLOPURINOL 300 MG: 300 TABLET ORAL at 08:06

## 2023-03-26 RX ADMIN — PANCRELIPASE 24000 UNITS: 24000; 76000; 120000 CAPSULE, DELAYED RELEASE PELLETS ORAL at 16:57

## 2023-03-26 RX ADMIN — POTASSIUM CHLORIDE 40 MEQ: 1500 TABLET, EXTENDED RELEASE ORAL at 09:06

## 2023-03-26 RX ADMIN — METOPROLOL SUCCINATE 12.5 MG: 25 TABLET, EXTENDED RELEASE ORAL at 08:06

## 2023-03-26 RX ADMIN — CLOPIDOGREL BISULFATE 75 MG: 75 TABLET, FILM COATED ORAL at 08:06

## 2023-03-26 RX ADMIN — ATORVASTATIN CALCIUM 80 MG: 80 TABLET, FILM COATED ORAL at 16:57

## 2023-03-26 RX ADMIN — ASPIRIN 81 MG: 81 TABLET, COATED ORAL at 08:06

## 2023-03-26 RX ADMIN — HEPARIN SODIUM 5000 UNITS: 5000 INJECTION INTRAVENOUS; SUBCUTANEOUS at 05:32

## 2023-03-26 RX ADMIN — INSULIN LISPRO 1 UNITS: 100 INJECTION, SOLUTION INTRAVENOUS; SUBCUTANEOUS at 16:57

## 2023-03-26 RX ADMIN — DOCUSATE SODIUM 100 MG: 100 CAPSULE, LIQUID FILLED ORAL at 16:57

## 2023-03-26 NOTE — ASSESSMENT & PLAN NOTE
· Did have blood loss anemia requiring 2 units PRBC    · Given downtrending hemoglobin, being transfused another unit today  · No evidence of overt bleeding    Results from last 7 days   Lab Units 03/26/23  0524 03/25/23  1326 03/24/23  0506 03/23/23  1333   HEMOGLOBIN g/dL 6 5* 7 0* 8 2* 9 3*

## 2023-03-26 NOTE — ASSESSMENT & PLAN NOTE
Lab Results   Component Value Date    HGBA1C 7 6 (H) 02/10/2023     Recent Labs     03/25/23  1555 03/25/23  2133 03/26/23  0739 03/26/23  1106   POCGLU 178* 136 141* 201*     · Continue sliding-scale insulin

## 2023-03-26 NOTE — PROGRESS NOTES
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 [address] )  Assessment & Plan  · Did have blood loss anemia requiring 2 units PRBC  · Given downtrending hemoglobin, being transfused another unit today  · No evidence of overt bleeding    Results from last 7 days   Lab Units 03/26/23  0524 03/25/23  1326 03/24/23  0506 03/23/23  1333   HEMOGLOBIN g/dL 6 5* 7 0* 8 2* 9 3*       Benign hypertension with chronic kidney disease, stage IV ([facility])  Assessment & Plan  · Primary hypertension  Vitals stable  Continue amlodipine  · CKD 4  Follows with Dr Maik Santana as an outpatient  Renal function at baseline  Results from last 7 days   Lab Units 03/26/23  0524 03/25/23  1326 03/24/23  0506 03/23/23  1333   BUN mg/dL 39* 41* 39* 36*   CREATININE mg/dL 1 84* 2 00* 1 96* 2 07*   EGFR ml/min/1 73sq m 33 30 31 29       Hyperlipidemia  Assessment & Plan  · Continue atorvastatin    Controlled type 2 diabetes mellitus with stage 4 chronic kidney disease, without long-term current use of insulin [facility])  Assessment & Plan  Lab Results   Component Value Date    HGBA1C 7 6 (H) 02/10/2023     Recent Labs     03/25/23  1555 03/25/23  2133 03/26/23  0739 03/26/23  1106   POCGLU 178* 136 141* 201*     · Continue sliding-scale insulin    CAD (coronary artery disease)  Assessment & Plan  · CAD with history of CABG and subsequent PCI in 2022    No chest "pain   · Continue aspirin and clopidogrel       VTE Pharmacologic Prophylaxis: VTE Score: 5 High Risk (Score >/= 5) - Pharmacological DVT Prophylaxis Ordered: heparin  Sequential Compression Devices Ordered  Patient Centered Rounds: I have performed bedside rounds with nursing staff today  Discussions with Specialists or Other Care Team Provider:     Education and Discussions with Family / Patient:     Time Spent for Care: This time was spent on one or more of the following: performing physical exam; counseling and coordination of care; obtaining or reviewing history; documenting in the medical record; reviewing/ordering tests, medications or procedures; communicating with other healthcare professionals and discussing with patient's family/caregivers  Current Length of Stay: 3 day(s)  Current Patient Status: Inpatient   Certification Statement:   Discharge Plan: Sidneycharanjit Marino is following this patient on consult  They are medically stable for discharge when deemed appropriate by primary service and if hemoglobin on repeat is stable after transfusion    Code Status: Level 1 - Full Code      Subjective:   Patient seen and examined  Reports that there is some bleeding from heparin injection sites  No other complaints  Objective:   Vitals: Blood pressure (!) 154/46, pulse 55, temperature 97 8 °F (36 6 °C), resp  rate 18, height 5' 10\" (1 778 m), weight 82 1 kg (181 lb), SpO2 97 %  Intake/Output Summary (Last 24 hours) at 3/26/2023 1104  Last data filed at 3/26/2023 0958  Gross per 24 hour   Intake 1440 ml   Output 850 ml   Net 590 ml       Physical Exam  Vitals reviewed  Constitutional:       General: He is not in acute distress  Appearance: Normal appearance  HENT:      Head: Atraumatic  Cardiovascular:      Rate and Rhythm: Regular rhythm  Pulmonary:      Effort: Pulmonary effort is normal       Breath sounds: No wheezing     Abdominal:      General: Bowel sounds are normal       Palpations: Abdomen " is soft  Tenderness: There is no abdominal tenderness  There is no rebound  Musculoskeletal:         General: Tenderness present  No swelling  Cervical back: Normal range of motion  Skin:     General: Skin is warm and dry  Neurological:      Mental Status: He is alert  Cranial Nerves: No cranial nerve deficit  Psychiatric:         Mood and Affect: Mood normal        Additional Data:   Labs:  Results from last 7 days   Lab Units 03/26/23  0524 03/25/23  1326 03/24/23  0506 03/23/23  1333   WBC Thousand/uL 6 16 7 62 9 78 9 55   HEMOGLOBIN g/dL 6 5* 7 0* 8 2* 9 3*   PLATELETS Thousands/uL 100* 98* 110* 123*   MCV fL 94 94 92 93   INR   --  1 10  --  1 18     Results from last 7 days   Lab Units 03/26/23  0524 03/25/23  1326 03/24/23  0506   SODIUM mmol/L 140 139 138   POTASSIUM mmol/L 3 7 4 2 4 4   CHLORIDE mmol/L 112* 110* 110*   CO2 mmol/L 23 24 22   ANION GAP mmol/L 5 5 6   BUN mg/dL 39* 41* 39*   CREATININE mg/dL 1 84* 2 00* 1 96*   CALCIUM mg/dL 8 2* 8 4 8 2*   EGFR ml/min/1 73sq m 33 30 31   GLUCOSE RANDOM mg/dL 143* 142* 183*     Results from last 7 days   Lab Units 03/24/23  0506   MAGNESIUM mg/dL 1 8*   PHOSPHORUS mg/dL 3 4                      Results from last 7 days   Lab Units 03/26/23  0739 03/25/23  2133 03/25/23  1555 03/25/23  1124 03/25/23  0749 03/24/23  1616 03/24/23  1038 03/24/23  0722 03/23/23  1741 03/23/23  1528 03/23/23  1323 03/23/23  0637   POC GLUCOSE mg/dl 141* 136 178* 157* 164* 157* 238* 184* 204* 186* 174* 165*             * I Have Reviewed All Lab Data Listed Above      Cultures:   Results from last 7 days   Lab Units 03/23/23  0616   INFLUENZA A PCR  Negative       Results from last 7 days   Lab Units 03/23/23  0616   SARS-COV-2  Negative   INFLUENZA A PCR  Negative   INFLUENZA B PCR  Negative   RSV PCR  Negative           Lines/Drains:  Invasive Devices     Peripheral Intravenous Line  Duration           Peripheral IV 03/26/23 Left;Ventral (anterior) Forearm <1 day              Telemetry:      Imaging:  Imaging Reports Reviewed Today Include:   No results found  Scheduled Meds:  Current Facility-Administered Medications   Medication Dose Route Frequency Provider Last Rate   • acetaminophen  650 mg Oral Q6H PRN Pattricia Plump, CRNP     • allopurinol  300 mg Oral Daily Pattricia Plump, CRNP     • amLODIPine  10 mg Oral Daily Pattricia Plump, CRNP     • aspirin  81 mg Oral Daily Pattricia Plump, CRNP     • atorvastatin  80 mg Oral Daily With Dinner Pattricia Plump, CRNP     • clopidogrel  75 mg Oral Daily Pattricia Plump, CRNP     • docusate sodium  100 mg Oral BID Pattricia Plump, CRNP     • heparin (porcine)  5,000 Units Subcutaneous Q8H [address] Pattricia Plump, CRNP     • insulin lispro  1-6 Units Subcutaneous TID AC Pattricia Plump, CRNP     • metoprolol succinate  12 5 mg Oral Daily Pattricia Plump, CRNP     • morphine injection  2 mg Intravenous Q3H PRN Pattricia Plump, CRNP     • ondansetron  4 mg Intravenous Q6H PRN Pattricia Plump, CRNP     • oxyCODONE  10 mg Oral Q4H PRN Pattricia Plump, CRNP     • oxyCODONE  5 mg Oral Q4H PRN Pattricia Plump, CRNP     • pancrelipase (Lip-Prot-Amyl)  24,000 Units Oral TID With Meals Pattricia Plump, CRNP     • pantoprazole  40 mg Oral Daily Pattricia Plump, CRNP     • polyethylene glycol  17 g Oral Daily PRN Pattricia Plump, CRNP         Today, Patient Was Seen By: Serjio Nice DO    ** Please Note: Dictation voice to text software may have been used in the creation of this document   **

## 2023-03-26 NOTE — PLAN OF CARE
Problem: MOBILITY - ADULT  Goal: Maintain or return to baseline ADL function  Description: INTERVENTIONS:  -  Assess patient's ability to carry out ADLs; assess patient's baseline for ADL function and identify physical deficits which impact ability to perform ADLs (bathing, care of mouth/teeth, toileting, grooming, dressing, etc )  - Assess/evaluate cause of self-care deficits   - Assess range of motion  - Assess patient's mobility; develop plan if impaired  - Assess patient's need for assistive devices and provide as appropriate  - Encourage maximum independence but intervene and supervise when necessary  - Involve family in performance of ADLs  - Assess for home care needs following discharge   - Consider OT consult to assist with ADL evaluation and planning for discharge  - Provide patient education as appropriate  Outcome: Progressing  Goal: Maintains/Returns to pre admission functional level  Description: INTERVENTIONS:  - Perform BMAT or MOVE assessment daily    - Set and communicate daily mobility goal to care team and patient/family/caregiver  - Collaborate with rehabilitation services on mobility goals if consulted  - Perform Range of Motion 3 times a day  - Reposition patient every 2 hours    - Dangle patient 3 times a day  - Stand patient 3 times a day  - Ambulate patient 3 times a day  - Out of bed to chair 3 times a day   - Out of bed for meals 3 times a day  - Out of bed for toileting  - Record patient progress and toleration of activity level   Outcome: Progressing     Problem: PAIN - ADULT  Goal: Verbalizes/displays adequate comfort level or baseline comfort level  Description: Interventions:  - Encourage patient to monitor pain and request assistance  - Assess pain using appropriate pain scale  - Administer analgesics based on type and severity of pain and evaluate response  - Implement non-pharmacological measures as appropriate and evaluate response  - Consider cultural and social influences on pain and pain management  - Notify physician/advanced practitioner if interventions unsuccessful or patient reports new pain  Outcome: Progressing     Problem: SAFETY ADULT  Goal: Maintain or return to baseline ADL function  Description: INTERVENTIONS:  -  Assess patient's ability to carry out ADLs; assess patient's baseline for ADL function and identify physical deficits which impact ability to perform ADLs (bathing, care of mouth/teeth, toileting, grooming, dressing, etc )  - Assess/evaluate cause of self-care deficits   - Assess range of motion  - Assess patient's mobility; develop plan if impaired  - Assess patient's need for assistive devices and provide as appropriate  - Encourage maximum independence but intervene and supervise when necessary  - Involve family in performance of ADLs  - Assess for home care needs following discharge   - Consider OT consult to assist with ADL evaluation and planning for discharge  - Provide patient education as appropriate  Outcome: Progressing  Goal: Maintains/Returns to pre admission functional level  Description: INTERVENTIONS:  - Perform BMAT or MOVE assessment daily    - Set and communicate daily mobility goal to care team and patient/family/caregiver  - Collaborate with rehabilitation services on mobility goals if consulted  - Perform Range of Motion 3 times a day  - Reposition patient every 2 hours    - Dangle patient 3 times a day  - Stand patient 3 times a day  - Ambulate patient 3 times a day  - Out of bed to chair 3 times a day   - Out of bed for meals 3 times a day  - Out of bed for toileting  - Record patient progress and toleration of activity level   Outcome: Progressing  Goal: Patient will remain free of falls  Description: INTERVENTIONS:  - Educate patient/family on patient safety including physical limitations  - Instruct patient to call for assistance with activity   - Consult OT/PT to assist with strengthening/mobility   - Keep Call bell within reach  - Keep bed low and locked with side rails adjusted as appropriate  - Keep care items and personal belongings within reach  - Initiate and maintain comfort rounds  - Make Fall Risk Sign visible to staff  - Apply yellow socks and bracelet for high fall risk patients  - Consider moving patient to room near nurses station  Outcome: Progressing     Problem: DISCHARGE PLANNING  Goal: Discharge to home or other facility with appropriate resources  Description: INTERVENTIONS:  - Identify barriers to discharge w/patient and caregiver  - Arrange for needed discharge resources and transportation as appropriate  - Identify discharge learning needs (meds, wound care, etc )  - Arrange for interpretive services to assist at discharge as needed  - Refer to Case Management Department for coordinating discharge planning if the patient needs post-hospital services based on physician/advanced practitioner order or complex needs related to functional status, cognitive ability, or social support system  Outcome: Progressing     Problem: Knowledge Deficit  Goal: Patient/family/caregiver demonstrates understanding of disease process, treatment plan, medications, and discharge instructions  Description: Complete learning assessment and assess knowledge base  Interventions:  - Provide teaching at level of understanding  - Provide teaching via preferred learning methods  Outcome: Progressing     Problem: HEMATOLOGIC - ADULT  Goal: Maintains hematologic stability  Description: INTERVENTIONS  - Assess for signs and symptoms of bleeding or hemorrhage  - Monitor labs  - Administer supportive blood products/factors as ordered and appropriate  Outcome: Progressing     Problem: NEUROSENSORY - ADULT  Goal: Achieves maximal functionality and self care  Description: INTERVENTIONS  - Monitor swallowing and airway patency with patient fatigue and changes in neurological status  - Encourage and assist patient to increase activity and self care     - Encourage visually impaired, hearing impaired and aphasic patients to use assistive/communication devices  Outcome: Progressing     Problem: METABOLIC, FLUID AND ELECTROLYTES - ADULT  Goal: Fluid balance maintained  Description: INTERVENTIONS:  - Monitor labs   - Monitor I/O and WT  - Instruct patient on fluid and nutrition as appropriate  - Assess for signs & symptoms of volume excess or deficit  Outcome: Progressing  Goal: Glucose maintained within target range  Description: INTERVENTIONS:  - Monitor Blood Glucose as ordered  - Assess for signs and symptoms of hyperglycemia and hypoglycemia  - Administer ordered medications to maintain glucose within target range  - Assess nutritional intake and initiate nutrition service referral as needed  Outcome: Progressing     Problem: MUSCULOSKELETAL - ADULT  Goal: Maintain or return mobility to safest level of function  Description: INTERVENTIONS:  - Assess patient's ability to carry out ADLs; assess patient's baseline for ADL function and identify physical deficits which impact ability to perform ADLs (bathing, care of mouth/teeth, toileting, grooming, dressing, etc )  - Assess/evaluate cause of self-care deficits   - Assess range of motion  - Assess patient's mobility  - Assess patient's need for assistive devices and provide as appropriate  - Encourage maximum independence but intervene and supervise when necessary  - Involve family in performance of ADLs  - Assess for home care needs following discharge   - Consider OT consult to assist with ADL evaluation and planning for discharge  - Provide patient education as appropriate  Outcome: Progressing     Problem: Prexisting or High Potential for Compromised Skin Integrity  Goal: Skin integrity is maintained or improved  Description: INTERVENTIONS:  - Identify patients at risk for skin breakdown  - Assess and monitor skin integrity  - Assess and monitor nutrition and hydration status  - Monitor labs   - Assess for incontinence   - Turn and reposition patient  - Assist with mobility/ambulation  - Relieve pressure over bony prominences  - Avoid friction and shearing  - Provide appropriate hygiene as needed including keeping skin clean and dry  - Evaluate need for skin moisturizer/barrier cream  - Collaborate with interdisciplinary team   - Patient/family teaching  - Consider wound care consult   Outcome: Progressing

## 2023-03-26 NOTE — ASSESSMENT & PLAN NOTE
· Right lower extremity PAD status post right CFA endarterectomy with angioplasty  · Currently doing well and pain controlled  · Remains on DAPT for underlying CAD  · Medically stable for discharge if okay with primary service and repeat hemoglobin

## 2023-03-26 NOTE — PROGRESS NOTES
"Progress Note - Vascular Surgery   Mio Holloway  [de-identified] y o  male MRN: [id number]  Unit/Bed#: [facility] 2 -02 Encounter: [id number]    Assessment:  [de-identified] w RLE PAD s/p R CFA endarterectomy, bovine patch angioplasty w/antegrade SFA/pop PTA/shockwave angioplasty on 3/23    Vitals stable, afebrile  Hemoglobin 6 5 from 7 0  Creatinine 1 84 from 2      Plan:  -Cardiac diet, CCD2, sodium restriction  -We will transfuse 1 unit PRBC, repeat hemoglobin this afternoon  -ASA81/Plavix/statin  -SSI  -SQH  -Pain control  -Encourage ambulation, per PT/OT home health rehab  -If hemoglobin remained stable after transfusion possible discharge home today versus tomorrow  Subjective/Objective   Subjective:   No acute events overnight  Doing well  Pain is well controlled  No chest pain or shortness of breath  No fevers or chills  Tolerating diet    Objective:     Blood pressure 143/76, pulse 75, temperature 97 9 °F (36 6 °C), temperature source Oral, resp  rate 16, height 5' 10\" (1 778 m), weight 82 1 kg (181 lb), SpO2 97 %  ,Body mass index is 25 97 kg/m²  Intake/Output Summary (Last 24 hours) at 3/26/2023 0916  Last data filed at 3/26/2023 0528  Gross per 24 hour   Intake 1140 ml   Output 850 ml   Net 290 ml       Invasive Devices     Peripheral Intravenous Line  Duration           Peripheral IV 03/23/23 Dorsal (posterior); Right Hand 3 days                Physical Exam:  General: NAD  HENT: NCAT MMM  Neck: supple, no JVD  CV: nl rate  Lungs: nl wob  No resp distress  ABD: Soft, nontender, nondistended  Extrem: No CCE  Right groin incision is clean dry and intact, some ecchymosis noted around the incision but no hematoma  Motor/sensory intact  Neuro: AAOx3      Lab, Imaging and other studies:I have personally reviewed pertinent lab results    , CBC:   Lab Results   Component Value Date    WBC 6 16 03/26/2023    HGB 6 5 (LL) 03/26/2023    HCT 20 5 (L) 03/26/2023    MCV 94 03/26/2023     (L) 03/26/2023    MCH 29 4 " 03/26/2023    MCHC 31 2 (L) 03/26/2023    RDW 15 2 (H) 03/26/2023    MPV 11 4 03/26/2023    NRBC 0 03/26/2023   , CMP:   Lab Results   Component Value Date    SODIUM 140 03/26/2023    K 3 7 03/26/2023     (H) 03/26/2023    CO2 23 03/26/2023    BUN 39 (H) 03/26/2023    CREATININE 1 84 (H) 03/26/2023    CALCIUM 8 2 (L) 03/26/2023    EGFR 33 03/26/2023     VTE Pharmacologic Prophylaxis: Sequential compression device (Venodyne)  and Heparin  VTE Mechanical Prophylaxis: sequential compression device

## 2023-03-26 NOTE — ASSESSMENT & PLAN NOTE
· Primary hypertension  Vitals stable  Continue amlodipine  · CKD 4  Follows with Dr Henrietta Crespo as an outpatient  Renal function at baseline      Results from last 7 days   Lab Units 03/26/23  0524 03/25/23  1326 03/24/23  0506 03/23/23  1333   BUN mg/dL 39* 41* 39* 36*   CREATININE mg/dL 1 84* 2 00* 1 96* 2 07*   EGFR ml/min/1 73sq m 33 30 31 29

## 2023-03-27 VITALS
RESPIRATION RATE: 17 BRPM | WEIGHT: 181 LBS | BODY MASS INDEX: 25.91 KG/M2 | HEART RATE: 65 BPM | TEMPERATURE: 98 F | HEIGHT: 70 IN | OXYGEN SATURATION: 98 % | SYSTOLIC BLOOD PRESSURE: 163 MMHG | DIASTOLIC BLOOD PRESSURE: 56 MMHG

## 2023-03-27 LAB
ABO GROUP BLD BPU: NORMAL
BPU ID: NORMAL
CROSSMATCH: NORMAL
ERYTHROCYTE [DISTWIDTH] IN BLOOD BY AUTOMATED COUNT: 14.8 % (ref 11.6–15.1)
GLUCOSE SERPL-MCNC: 137 MG/DL (ref 65–140)
HCT VFR BLD AUTO: 25.6 % (ref 36.5–49.3)
HGB BLD-MCNC: 8.2 G/DL (ref 12–17)
MCH RBC QN AUTO: 29.6 PG (ref 26.8–34.3)
MCHC RBC AUTO-ENTMCNC: 32 G/DL (ref 31.4–37.4)
MCV RBC AUTO: 92 FL (ref 82–98)
PLATELET # BLD AUTO: 132 THOUSANDS/UL (ref 149–390)
PMV BLD AUTO: 10.6 FL (ref 8.9–12.7)
RBC # BLD AUTO: 2.77 MILLION/UL (ref 3.88–5.62)
UNIT DISPENSE STATUS: NORMAL
UNIT PRODUCT CODE: NORMAL
UNIT PRODUCT VOLUME: 350 ML
UNIT RH: NORMAL
WBC # BLD AUTO: 5.72 THOUSAND/UL (ref 4.31–10.16)

## 2023-03-27 RX ORDER — OXYCODONE HYDROCHLORIDE 5 MG/1
5 TABLET ORAL EVERY 6 HOURS PRN
Qty: 10 TABLET | Refills: 0 | Status: SHIPPED | OUTPATIENT
Start: 2023-03-27 | End: 2023-04-06

## 2023-03-27 RX ADMIN — METOPROLOL SUCCINATE 12.5 MG: 25 TABLET, EXTENDED RELEASE ORAL at 08:26

## 2023-03-27 RX ADMIN — ASPIRIN 81 MG: 81 TABLET, COATED ORAL at 08:26

## 2023-03-27 RX ADMIN — AMLODIPINE BESYLATE 10 MG: 10 TABLET ORAL at 08:26

## 2023-03-27 RX ADMIN — DOCUSATE SODIUM 100 MG: 100 CAPSULE, LIQUID FILLED ORAL at 08:26

## 2023-03-27 RX ADMIN — ALLOPURINOL 300 MG: 300 TABLET ORAL at 08:26

## 2023-03-27 RX ADMIN — CLOPIDOGREL BISULFATE 75 MG: 75 TABLET, FILM COATED ORAL at 08:26

## 2023-03-27 RX ADMIN — PANCRELIPASE 24000 UNITS: 24000; 76000; 120000 CAPSULE, DELAYED RELEASE PELLETS ORAL at 08:26

## 2023-03-27 RX ADMIN — PANTOPRAZOLE SODIUM 40 MG: 40 TABLET, DELAYED RELEASE ORAL at 08:26

## 2023-03-27 NOTE — RESTORATIVE TECHNICIAN NOTE
Restorative Technician Note      Patient Name: Mary Jo Clark  Restorative Tech Visit Date: 03/27/23  Note Type: Mobility  Patient Position Upon Consult: Supine  Activity Performed: Ambulated  Assistive Device: Roller walker  Patient Position at End of Consult: Bedside chair;  All needs within reach

## 2023-03-27 NOTE — NURSING NOTE
IV removed  AVS reviewed with pt who verbalized understanding  Wheelchair provided via CM delivery  Rx to community pharmacy

## 2023-03-27 NOTE — ASSESSMENT & PLAN NOTE
Lab Results   Component Value Date    HGBA1C 7 6 (H) 02/10/2023     Recent Labs     03/26/23  1106 03/26/23  1628 03/26/23 2122 03/27/23  0750   POCGLU 201* 198* 119 137     · Continue sliding-scale insulin

## 2023-03-27 NOTE — ASSESSMENT & PLAN NOTE
Primary hypertension  Vitals stable  Continue amlodipine  · CKD 4  Follows with Dr Sandra Hollingsworth as an outpatient  Renal function at baseline      Results from last 7 days   Lab Units 03/26/23  0524 03/25/23  1326 03/24/23  0506 03/23/23  1333   BUN mg/dL 39* 41* 39* 36*   CREATININE mg/dL 1 84* 2 00* 1 96* 2 07*   EGFR ml/min/1 73sq m 33 30 31 29

## 2023-03-27 NOTE — PROGRESS NOTES
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 [facility])  Assessment & Plan  Right lower extremity PAD status post right CFA endarterectomy with angioplasty  · Currently doing well and pain controlled  · Remains on DAPT for underlying CAD  · Medically stable for discharge if okay with primary service     Anemia due to stage 4 chronic kidney disease ([facility] )  Assessment & Plan  Did have blood loss anemia requiring 2 units PRBC  · No evidence of overt bleeding  · Hemoglobin now stable at 8 2    Results from last 7 days   Lab Units 03/27/23  0511 03/26/23  1408 03/26/23  0524 03/25/23  1326   HEMOGLOBIN g/dL 8 2* 8 6* 6 5* 7 0*       Benign hypertension with chronic kidney disease, stage IV ([facility] )  Assessment & Plan  Primary hypertension  Vitals stable  Continue amlodipine  · CKD 4  Follows with Dr Nina Lefort as an outpatient  Renal function at baseline  Results from last 7 days   Lab Units 03/26/23  0524 03/25/23  1326 03/24/23  0506 03/23/23  1333   BUN mg/dL 39* 41* 39* 36*   CREATININE mg/dL 1 84* 2 00* 1 96* 2 07*   EGFR ml/min/1 73sq m 33 30 31 29       Hyperlipidemia  Assessment & Plan  · Continue atorvastatin    Controlled type 2 diabetes mellitus with stage 4 chronic kidney disease, without long-term current use of insulin [facility])  Assessment & Plan  Lab Results   Component Value Date    HGBA1C 7 6 (H) 02/10/2023     Recent Labs     03/26/23  1106 03/26/23  1628 03/26/23  2122 03/27/23  0750   POCGLU 201* 198* 119 137     · Continue sliding-scale insulin    CAD (coronary artery disease)  Assessment & Plan  CAD with history of CABG and subsequent PCI in 2022  No chest pain    · Continue aspirin and clopidogrel         VTE Pharmacologic Prophylaxis: VTE Score: 5 High Risk (Score >/= 5) - Pharmacological DVT Prophylaxis Ordered: heparin  Sequential Compression Devices Ordered  Patient Centered Rounds: I performed bedside rounds with nursing staff today  Discussions with Specialists or Other Care Team Provider: None    Education and Discussions with Family / Patient: Patient declined call to   Total Time Spent on Date of Encounter in care of patient: 25 minutes This time was spent on one or more of the following: performing physical exam; counseling and coordination of care; obtaining or reviewing history; documenting in the medical record; reviewing/ordering tests, medications or procedures; communicating with other healthcare professionals and discussing with patient's family/caregivers  Current Length of Stay: 4 day(s)  Current Patient Status: Inpatient   Certification Statement: Patient to be discharged  Discharge Plan: [facility] is following this patient on consult  They are medically stable for discharge when deemed appropriate by primary service  Code Status: Level 1 - Full Code    Subjective:   Patient seen resting comfortably in bed  He is eager to go home today  He offers no complaints at this time, denies any lightheadedness or dizziness, denies any nausea or vomiting, fevers or chills, chest pain or shortness of breath  Objective:     Vitals:   Temp (24hrs), Av 2 °F (36 8 °C), Min:98 °F (36 7 °C), Max:98 4 °F (36 9 °C)    Temp:  [98 °F (36 7 °C)-98 4 °F (36 9 °C)] 98 °F (36 7 °C)  HR:  [65-71] 65  Resp:  [17-19] 17  BP: (149-163)/(50-56) 163/56  SpO2:  [94 %-98 %] 98 %  Body mass index is 25 97 kg/m²  Input and Output Summary (last 24 hours): Intake/Output Summary (Last 24 hours) at 3/27/2023 1337  Last data filed at 3/26/2023 1851  Gross per 24 hour   Intake 720 ml   Output --   Net 720 ml       Physical Exam:   Physical Exam  Vitals and nursing note reviewed     Constitutional:       General: He is not in acute distress  Appearance: Normal appearance  He is not ill-appearing, toxic-appearing or diaphoretic  HENT:      Head: Normocephalic and atraumatic  Cardiovascular:      Rate and Rhythm: Normal rate and regular rhythm  Heart sounds: No murmur heard  No friction rub  No gallop  Pulmonary:      Effort: Pulmonary effort is normal  No respiratory distress  Breath sounds: Normal breath sounds  No wheezing, rhonchi or rales  Abdominal:      General: Abdomen is flat  Bowel sounds are normal  There is no distension  Palpations: Abdomen is soft  Tenderness: There is no abdominal tenderness  Musculoskeletal:      Right lower leg: No edema  Left lower leg: No edema  Skin:     General: Skin is warm and dry  Coloration: Skin is not jaundiced or pale  Neurological:      General: No focal deficit present  Mental Status: He is alert  Mental status is at baseline  Additional Data:     Labs:  Results from last 7 days   Lab Units 03/27/23  0511 03/26/23  1408 03/26/23  0524   WBC Thousand/uL 5 72  --  6 16   HEMOGLOBIN g/dL 8 2*   < > 6 5*   HEMATOCRIT % 25 6*   < > 20 5*   PLATELETS Thousands/uL 132*  --  100*   NEUTROS PCT %  --   --  70   LYMPHS PCT %  --   --  18   MONOS PCT %  --   --  9   EOS PCT %  --   --  2    < > = values in this interval not displayed       Results from last 7 days   Lab Units 03/26/23  0524   SODIUM mmol/L 140   POTASSIUM mmol/L 3 7   CHLORIDE mmol/L 112*   CO2 mmol/L 23   BUN mg/dL 39*   CREATININE mg/dL 1 84*   ANION GAP mmol/L 5   CALCIUM mg/dL 8 2*   GLUCOSE RANDOM mg/dL 143*     Results from last 7 days   Lab Units 03/25/23  1326   INR  1 10     Results from last 7 days   Lab Units 03/27/23  0750 03/26/23  2122 03/26/23  1628 03/26/23  1106 03/26/23  0739 03/25/23  2133 03/25/23  1555 03/25/23  1124 03/25/23  0749 03/24/23  1616 03/24/23  1038 03/24/23  0722   POC GLUCOSE mg/dl 137 119 198* 201* 141* 136 178* 157* 164* 157* 238* 184* Lines/Drains:  Invasive Devices     Peripheral Intravenous Line  Duration           Peripheral IV 03/26/23 Left;Ventral (anterior) Forearm 1 day                      Imaging: No pertinent imaging reviewed  Recent Cultures (last 7 days):         Last 24 Hours Medication List:   Current Facility-Administered Medications   Medication Dose Route Frequency Provider Last Rate   • acetaminophen  650 mg Oral Q6H PRN ZHANNA Madison     • allopurinol  300 mg Oral Daily ZHANNA Madison     • amLODIPine  10 mg Oral Daily ZHANNA Madison     • aspirin  81 mg Oral Daily ZHANNA Madison     • atorvastatin  80 mg Oral Daily With Dinner ZHANNA Madison     • clopidogrel  75 mg Oral Daily ZHANNA Madison     • docusate sodium  100 mg Oral BID ZHANNA Madison     • heparin (porcine)  5,000 Units Subcutaneous [facility] ZHANNA Madison     • insulin lispro  1-6 Units Subcutaneous TID AC ZHANNA Madison     • metoprolol succinate  12 5 mg Oral Daily ZAHNNA Madison     • morphine injection  2 mg Intravenous Q3H PRN ZHANNA Madison     • ondansetron  4 mg Intravenous Q6H PRN ZHANNA Madison     • oxyCODONE  10 mg Oral Q4H PRN ZHANNA Madison     • oxyCODONE  5 mg Oral Q4H PRN ZHANNA Madison     • pancrelipase (Lip-Prot-Amyl)  24,000 Units Oral TID With Meals ZHANNA Madison     • pantoprazole  40 mg Oral Daily ZHANNA Madison     • polyethylene glycol  17 g Oral Daily PRN ZHANNA Madison          Today, Patient Was Seen By: Ady Cope PA-C    **Please Note: This note may have been constructed using a voice recognition system  **

## 2023-03-27 NOTE — PROGRESS NOTES
"Progress Note - Vascular Surgery   Miranda Juan  [de-identified] y o  male MRN: [id number]  Unit/Bed#: [place] [facility] 2 -02 Encounter: [id number]    Assessment:  78M smoker w/HTN, HLD, type II DM, CKD, CAD s/p CABG '13, aortic stenosis s/p TAVR '22, bilateral carotid stenosis and PAD, presenting with rest pain/tissue loss of the RLE       S/p R CFA endarterectomy, bovine patch angioplasty w/antegrade SFA/pop PTA/shockwave angioplasty 3/23/23 ([facility])     Hgb  8 2 -> 7 0 --> 6 5 -> 8 6 -> 8 2  WBC 5 72     Plan:  - POD#4 s/p revascularization of the RLE for tissue loss/rest pain  Patient states that RLE pain has significantly improved  - Incision is clean, dry and intact  Dressing removed  Some ecchymosis and edema    - Excellent R AT doppler signal  - Patient with 1L blood loss intraop, s/p 2U PRBC in the OR  Hgb 6 5 over the weekend, now s/p 1U PRBC  - Continue ASA, Plavix and statin  - Betadine paint to hallux fissures   - Plan for d/c today  - All questions and concerns addressed     Subjective:  Patient seen and examined  Offers no complaints  Notes complete resolution of rest pain  Denies chest pain, SOB  Vitals:  /56   Pulse 65   Temp 98 °F (36 7 °C)   Resp 17   Ht 5' 10\" (1 778 m)   Wt 82 1 kg (181 lb)   SpO2 98%   BMI 25 97 kg/m²     I/Os:  I/O last 3 completed shifts: In: 0949 [P O :1260; Blood:350]  Out: 200 [Urine:200]  No intake/output data recorded      Lab Results and Cultures:   CBC with diff:   Lab Results   Component Value Date    WBC 5 72 03/27/2023    HGB 8 2 (L) 03/27/2023    HCT 25 6 (L) 03/27/2023    MCV 92 03/27/2023     (L) 03/27/2023    MCH 29 6 03/27/2023    MCHC 32 0 03/27/2023    RDW 14 8 03/27/2023    MPV 10 6 03/27/2023    NRBC 0 03/26/2023   ,   BMP/CMP:  Lab Results   Component Value Date    SODIUM 140 03/26/2023    K 3 7 03/26/2023    K 4 1 11/22/2015     (H) 03/26/2023     11/22/2015    CO2 23 03/26/2023    CO2 25 03/23/2023    ANIONGAP 7 11/22/2015    " BUN 39 (H) 03/26/2023    BUN 15 11/22/2015    CREATININE 1 84 (H) 03/26/2023    CREATININE 0 90 11/22/2015    GLUCOSE 163 (H) 03/23/2023    GLUCOSE 125 11/22/2015    CALCIUM 8 2 (L) 03/26/2023    CALCIUM 8 4 11/22/2015    AST 11 02/10/2023    ALT 14 02/10/2023    ALKPHOS 87 02/10/2023    EGFR 33 03/26/2023   ,   Lipid Panel: No results found for: CHOL,   Coags:   Lab Results   Component Value Date    PTT 29 03/23/2023    PTT 27 10/28/2015    INR 1 10 03/25/2023    INR 1 14 10/30/2015   ,     Blood Culture: No results found for: BLOODCX,   Urinalysis:   Lab Results   Component Value Date    COLORU Yellow 09/29/2022    CLARITYU Clear 09/29/2022    SPECGRAV 1 018 09/29/2022    PHUR 5 5 09/29/2022    LEUKOCYTESUR Negative 09/29/2022    NITRITE Negative 09/29/2022    GLUCOSEU Negative 09/29/2022    KETONESU Negative 09/29/2022    BILIRUBINUR Negative 09/29/2022    BLOODU Negative 09/29/2022   ,   Urine Culture: No results found for: URINECX,   Wound Culure: No results found for: WOUNDCULT    Medications:  Current Facility-Administered Medications   Medication Dose Route Frequency   • acetaminophen (TYLENOL) tablet 650 mg  650 mg Oral Q6H PRN   • allopurinol (ZYLOPRIM) tablet 300 mg  300 mg Oral Daily   • amLODIPine (NORVASC) tablet 10 mg  10 mg Oral Daily   • aspirin (ECOTRIN LOW STRENGTH) EC tablet 81 mg  81 mg Oral Daily   • atorvastatin (LIPITOR) tablet 80 mg  80 mg Oral Daily With Dinner   • clopidogrel (PLAVIX) tablet 75 mg  75 mg Oral Daily   • docusate sodium (COLACE) capsule 100 mg  100 mg Oral BID   • heparin (porcine) subcutaneous injection 5,000 Units  5,000 Units Subcutaneous Q8H [address]   • insulin lispro (HumaLOG) 100 units/mL subcutaneous injection 1-6 Units  1-6 Units Subcutaneous TID AC   • metoprolol succinate (TOPROL-XL) 24 hr tablet 12 5 mg  12 5 mg Oral Daily   • morphine injection 2 mg  2 mg Intravenous Q3H PRN   • ondansetron (ZOFRAN) injection 4 mg  4 mg Intravenous Q6H PRN   • oxyCODONE (Nick Rodriguez) immediate release tablet 10 mg  10 mg Oral Q4H PRN   • oxyCODONE (ROXICODONE) IR tablet 5 mg  5 mg Oral Q4H PRN   • pancrelipase (Lip-Prot-Amyl) (CREON) delayed release capsule 24,000 Units  24,000 Units Oral TID With Meals   • pantoprazole (PROTONIX) EC tablet 40 mg  40 mg Oral Daily   • polyethylene glycol (MIRALAX) packet 17 g  17 g Oral Daily PRN       Imaging:  Reviewed  Physical Exam:    General: Alert and oriented  In no acute distress   CV: Regular rate   Respiratory: Normal effort   Abdominal: Soft, non-distended   Extremities: Warm, mild edema of the RLE   +AT signal  Hallux fissures   Neurologic: Grossly normal     Delvin De Los Santos PA-C  3/27/2023

## 2023-03-27 NOTE — ASSESSMENT & PLAN NOTE
Did have blood loss anemia requiring 2 units PRBC    · No evidence of overt bleeding  · Hemoglobin now stable at 8 2    Results from last 7 days   Lab Units 03/27/23  0511 03/26/23  1408 03/26/23  0524 03/25/23  1326   HEMOGLOBIN g/dL 8 2* 8 6* 6 5* 7 0*

## 2023-03-27 NOTE — PLAN OF CARE
Problem: PAIN - ADULT  Goal: Verbalizes/displays adequate comfort level or baseline comfort level  Description: Interventions:  - Encourage patient to monitor pain and request assistance  - Assess pain using appropriate pain scale  - Administer analgesics based on type and severity of pain and evaluate response  - Implement non-pharmacological measures as appropriate and evaluate response  - Consider cultural and social influences on pain and pain management  - Notify physician/advanced practitioner if interventions unsuccessful or patient reports new pain  3/27/2023 0321 by Patricia Maharaj RN  Outcome: Progressing  3/27/2023 0321 by Patricia Maharaj RN  Outcome: Progressing

## 2023-03-27 NOTE — ASSESSMENT & PLAN NOTE
Right lower extremity PAD status post right CFA endarterectomy with angioplasty  · Currently doing well and pain controlled  · Remains on DAPT for underlying CAD  · Medically stable for discharge if okay with primary service

## 2023-03-28 ENCOUNTER — TELEPHONE (OUTPATIENT)
Dept: NEPHROLOGY | Facility: CLINIC | Age: 78
End: 2023-03-28

## 2023-03-28 ENCOUNTER — TRANSITIONAL CARE MANAGEMENT (OUTPATIENT)
Dept: FAMILY MEDICINE CLINIC | Facility: CLINIC | Age: 78
End: 2023-03-28

## 2023-03-28 NOTE — TELEPHONE ENCOUNTER
Patient called about lab results and repeat labs  Spoke with him about the following, he expressed understanding and thanked us for the help:    Please inform patient that I reviewed the records from recent hospital stay, his last creatinine on 3/26 was 1 84 mg/dL and for most of the time has been around 1 9-2 0 which is within his baseline  We can just monitor with repeat labs in a month  He has blood work and urine test to be done before the office visit in May which he can do at the beginning of May

## 2023-03-28 NOTE — TELEPHONE ENCOUNTER
Patient called stating he was at  [facility] and was discharged yesterday  Patient believes his creatinine was at 1 9 and went up to 2  Patient wants to know if he needs anymore labs done  Please advise

## 2023-03-28 NOTE — TELEPHONE ENCOUNTER
Please inform patient that I reviewed the records from recent hospital stay, his last creatinine on 3/26 was 1 84 mg/dL and for most of the time has been around 1 9-2 0 which is within his baseline  We can just monitor with repeat labs in a month  He has blood work and urine test to be done before the office visit in May which he can do at the beginning of May

## 2023-03-29 ENCOUNTER — TELEPHONE (OUTPATIENT)
Dept: VASCULAR SURGERY | Facility: CLINIC | Age: 78
End: 2023-03-29

## 2023-03-29 NOTE — TELEPHONE ENCOUNTER
Vascular Nurse Navigator Post Op Call    Procedure: Right - Common femoral endarterectomy&antegrade intervention of SFA  popliteal artery w/ shockwave intravascular lithotripsy with 5 x 60 and 6 x 60 mm balloon   Balloon angioplasty of the anterior tibial artery with 3 mm balloon      Date of Procedure:  3/23/23     Surgeon: John Garner MD - Primary     * Celeste Hill PA-C - Assisting     Discharge Date:  3/27/23     Discharge Disposition: Home with [facility]    Leg Weakness?: No    Leg Swelling?: No    Leg Numbness?: No    Chest Pain?: No    Shortness of Breath?: No    Orthopnea?: No    Anticoagulation pt was discharged on post op?: Aspirin and Clopidogrel (Plavix)    Statin pt was discharged on post op?:  Lipitor (atorvastatin)    Bleeding?: No    Uncontrolled Pain?: No    Incision Concerns?: No    Fever or Chills?: No      Reviewed discharge instructions and incision care with patient  NEXT OFFICE VISIT SCHEDULED:  4/5/23 at 3:30 pm with Dr Chloe Maya at [facility] Confirmed?: Yes      Any further questions/concerns? Patient stated that he is doing good since discharge  He stated that his leg is swollen and still has some numbness in his foot, but he this is better than prior to procedure  Reviewed incision care with him - wash daily with soap and water  Reviewed discharge medications - Aspirin and Plavix  All questions answered  No concerns expressed at this time

## 2023-03-29 NOTE — TELEPHONE ENCOUNTER
Vascular Nurse Navigator Post Op Phone Call    Post-Discharge phone call attempted to assess patient recovery after vascular surgery I left a message on answering machine  Will attempt to contact at later date  Pt's chart was reviewed prior to call and leaving message  Procedure: Right - Common femoral endarterectomy&antegrade intervention of SFA  popliteal artery w/ shockwave intravascular lithotripsy with 5 x 60 and 6 x 60 mm balloon  Balloon angioplasty of the anterior tibial artery with 3 mm balloon  Date of Procedure:  3/23/23    Surgeon:    Keli Butler MD - Primary     * Delvin De Los Santos PA-C - Assisting    Discharge Date:  3/27/23    Discharge Disposition: Home with [facility]        Anticoagulation pt was discharged on post op?: Aspirin and Clopidogrel (Plavix)    Statin pt was discharged on post op?:  Lipitor (atorvastatin)    Reminded pt of the following in message:    NEXT OFFICE VISIT SCHEDULED:   4/5/23 at 3:30 pm with Dr Edward Kessler at [facility]    To contact [facility] with any concerns

## 2023-03-30 LAB
DME PARACHUTE DELIVERY DATE ACTUAL: NORMAL
DME PARACHUTE DELIVERY DATE REQUESTED: NORMAL
DME PARACHUTE ITEM DESCRIPTION: NORMAL
DME PARACHUTE ORDER STATUS: NORMAL
DME PARACHUTE SUPPLIER NAME: NORMAL
DME PARACHUTE SUPPLIER PHONE: NORMAL

## 2023-04-04 ENCOUNTER — TELEPHONE (OUTPATIENT)
Dept: FAMILY MEDICINE CLINIC | Facility: CLINIC | Age: 78
End: 2023-04-04

## 2023-04-04 NOTE — TELEPHONE ENCOUNTER
Received a call from OT with [facility] and she wanted to let you know that he declined evaluation for OT that he is doing it on his own

## 2023-04-04 NOTE — DISCHARGE SUMMARY
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 ([address] ) [[id number]]    HPI: 78M smoker w/HTN, HLD, type II DM, CKD, CAD s/p CABG '13, aortic stenosis s/p TAVR '22, bilateral carotid stenosis and PAD, presenting with rest pain/tissue loss of the RLE  Procedures Performed: No orders of the defined types were placed in this encounter  Summary of Hospital Course: Patient presented on 3/23/2023 to undergo elective right common femoral endarterectomy, bovine patch angioplasty with antegrade SFA/pop PTA/shockwave angioplasty  Patient was taken to the operating room  Underwent procedure without any complication  Patient was transfused 2 units in the operating room for acute blood loss anemia  He was taken to the ICU per protocol for postoperative management where he continued to progress and do well  He received 1 more unit of PRBC over the weekend after surgery  Physical therapy had recommended home therapy  Patient was deemed stable for discharge on 3/27/2023  Outpatient follow-up as already arranged  All questions and concerns were addressed  Significant Findings, Care, Treatment and Services Provided: As noted above    Complications: None    Discharge Diagnosis: PAD    Medical Problems     Resolved Problems  Date Reviewed: 3/27/2023   None         Condition at Discharge: stable         Discharge instructions/Information to patient and family:   See after visit summary for information provided to patient and family  Provisions for Follow-Up Care:  See after visit summary for information related to follow-up care and any pertinent home health orders  PCP: Lizbeth Lee MD    Disposition: See After Visit Summary for discharge disposition information  Planned Readmission: No      Discharge Statement   I spent 25 minutes discharging the patient  This time was spent on the day of discharge  I had direct contact with the patient on the day of discharge  Additional documentation is required if more than 30 minutes were spent on discharge  Discharge Medications:  See after visit summary for reconciled discharge medications provided to patient and family

## 2023-04-05 ENCOUNTER — OFFICE VISIT (OUTPATIENT)
Dept: VASCULAR SURGERY | Facility: CLINIC | Age: 78
End: 2023-04-05

## 2023-04-05 ENCOUNTER — OFFICE VISIT (OUTPATIENT)
Dept: DERMATOLOGY | Facility: CLINIC | Age: 78
End: 2023-04-05

## 2023-04-05 VITALS
SYSTOLIC BLOOD PRESSURE: 122 MMHG | RESPIRATION RATE: 18 BRPM | DIASTOLIC BLOOD PRESSURE: 62 MMHG | BODY MASS INDEX: 25.05 KG/M2 | WEIGHT: 175 LBS | HEIGHT: 70 IN | HEART RATE: 74 BPM

## 2023-04-05 VITALS — WEIGHT: 175 LBS | BODY MASS INDEX: 25.05 KG/M2 | HEIGHT: 70 IN | TEMPERATURE: 97.3 F

## 2023-04-05 DIAGNOSIS — I70.234 ATHEROSCLEROSIS OF NATIVE ARTERY OF RIGHT LOWER EXTREMITY WITH ULCERATION OF MIDFOOT (HCC): Primary | ICD-10-CM

## 2023-04-05 DIAGNOSIS — L57.0 KERATOSIS, ACTINIC: ICD-10-CM

## 2023-04-05 DIAGNOSIS — D48.9 NEOPLASM OF UNCERTAIN BEHAVIOR: Primary | ICD-10-CM

## 2023-04-05 NOTE — ASSESSMENT & PLAN NOTE
S/p RIGHT femoral endarterectomy, right SFA, Pop and AT angioplasty with excellent results  Triphasic AT / Dp signals  Foot wounds are healing well  Rest pain has resolved  Walking well without pain  Groin incision has healed well  Will schedule duplex in early June and cancel the one in April

## 2023-04-05 NOTE — PATIENT INSTRUCTIONS
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

## 2023-04-05 NOTE — PROGRESS NOTES
"[facility] Note     Patient Name: Srinivas Walls  Encounter Date: 04/05/2023    Have you been cared for by a [place] [id number] Dermatologist in the last 3 years and, if so, which description applies to you? Yes  I have been here within the last 3 years, and my medical history has NOT changed since that time  I am MALE/not capable of bearing children  REVIEW OF SYSTEMS:  Have you recently had or currently have any of the following? · No changes in my recent health  PAST MEDICAL HISTORY:  Have you personally ever had or currently have any of the following? If \"YES,\" then please provide more detail  · No changes in my medical history  FAMILY HISTORY:  Any \"first degree relatives\" (parent, brother, sister, or child) with the following? • No changes in my family's known health  PATIENT EXPERIENCE:    • Do you want the Dermatologist to perform a COMPLETE skin exam today including a clinical examination under the \"bra and underwear\" areas? NO  • If necessary, do we have your permission to call and leave a detailed message on your Preferred Phone number that includes your specific medical information?   Yes      No Known Allergies   Current Outpatient Medications:   •  acetaminophen (TYLENOL) 325 mg tablet, Take 3 tablets (975 mg total) by mouth every 8 (eight) hours, Disp: , Rfl: 0  •  allopurinol (ZYLOPRIM) 300 mg tablet, Take 1 tablet (300 mg total) by mouth daily, Disp: 90 tablet, Rfl: 3  •  amLODIPine (NORVASC) 10 mg tablet, TAKE 1 TABLET DAILY, Disp: 90 tablet, Rfl: 3  •  AMYLASE-LIPASE-PROTEASE PO, Take by mouth 2 (two) times a day before meals, Disp: , Rfl:   •  Ascorbic Acid (vitamin C) 100 MG tablet, Take 250 mg by mouth daily, Disp: , Rfl:   •  aspirin (ECOTRIN LOW STRENGTH) 81 mg EC tablet, Take 81 mg by mouth daily, Disp: , Rfl:   •  atorvastatin (LIPITOR) 80 mg tablet, TAKE 1 TABLET DAILY AT     BEDTIME, Disp: 90 tablet, Rfl: 3  •  calcitriol (ROCALTROL) 0 25 mcg capsule, " Take 1 capsule (0 25 mcg total) by mouth 3 (three) times a week, Disp: 36 capsule, Rfl: 4  •  clopidogrel (PLAVIX) 75 mg tablet, Take 1 tablet (75 mg total) by mouth in the morning , Disp: 90 tablet, Rfl: 3  •  Cyanocobalamin (VITAMIN B12 PO), Take by mouth once a week  , Disp: , Rfl:   •  docusate sodium (COLACE) 100 mg capsule, Take 1 capsule (100 mg total) by mouth 2 (two) times a day, Disp: , Rfl: 0  •  lisinopril (ZESTRIL) 10 mg tablet, Take 1 tablet (10 mg total) by mouth daily, Disp: 90 tablet, Rfl: 3  •  Magnesium Oxide (MAG-200 PO), Take by mouth once a week, Disp: , Rfl:   •  metoprolol succinate (TOPROL-XL) 25 mg 24 hr tablet, Take 0 5 tablets (12 5 mg total) by mouth daily, Disp: 45 tablet, Rfl: 1  •  oxyCODONE (ROXICODONE) 5 immediate release tablet, Take 1 tablet (5 mg total) by mouth every 6 (six) hours as needed for moderate pain for up to 10 days Max Daily Amount: 20 mg, Disp: 10 tablet, Rfl: 0  •  pancrelipase, Lip-Prot-Amyl, (CREON) 24,000 units, Take 24,000 units of lipase by mouth 3 (three) times a day with meals, Disp: 300 capsule, Rfl: 2  •  pantoprazole (PROTONIX) 40 mg tablet, TAKE 1 TABLET DAILY, Disp: 90 tablet, Rfl: 1  •  potassium chloride (K-DUR,KLOR-CON) 10 mEq tablet, Take 1 tablet (10 mEq total) by mouth daily, Disp: 90 tablet, Rfl: 3  •  torsemide (DEMADEX) 20 mg tablet, Take 1 tablet (20 mg total) by mouth 2 (two) times a day (Patient taking differently: Take 10 mg by mouth daily), Disp: 180 tablet, Rfl: 3          • Whom besides the patient is providing clinical information about today's encounter?   o NO ADDITIONAL HISTORIAN (patient alone provided history)    Physical Exam and Assessment/Plan by Diagnosis:    NEOPLASM OF UNCERTAIN BEHAVIOR OF SKIN    Physical Exam:  • (Anatomic Location); (Size and Morphological Description); (Differential Diagnosis):  o Right superior helix; 0 3 cm firm skin colored nodule; diffdx; chondrodermatitis nodularis helices vs SCC  • Pertinent "Positives:  • Pertinent Negatives: Additional History of Present Condition:  Patient here for spot of concern on ear that previously had cryotherapy  Patient states that spot does cause him some pain  Assessment and Plan:  • I have discussed with the patient that a sample of skin via a \"skin biopsy” would be potentially helpful to further make a specific diagnosis under the microscope  • Based on a thorough discussion of this condition and the management approach to it (including a comprehensive discussion of the known risks, side effects and potential benefits of treatment), the patient (family) agrees to implement the following specific plan:    o Procedure:  Skin Biopsy  After a thorough discussion of treatment options and risk/benefits/alternatives (including but not limited to local pain, scarring, dyspigmentation, blistering, possible superinfection, and inability to confirm a diagnosis via histopathology), verbal and written consent were obtained and portion of the rash was biopsied for tissue sample  See below for consent that was obtained from patient and subsequent Procedure Note  PROCEDURE TANGENTIAL (SHAVE) BIOPSY NOTE:    • Performing Physician: Horace Ramey   • Anatomic Location; Clinical Description with size (cm); Pre-Op Diagnosis:   o Right superior helix; 0 3 cm firm skin colored nodule; diffdx; chondrodermatitis nodularis helices vs SCC  o   • Post-op diagnosis: Same     • Local anesthesia: 1% Lidocaine HCL     • Topical anesthesia: None    • Hemostasis: Electrocautery       After obtaining informed consent  at which time there was a discussion about the purpose of biopsy  and low risks of infection and bleeding  The area was prepped and draped in the usual fashion  Anesthesia was obtained with 1% lidocaine with epinephrine   A shave biopsy to an appropriate sampling depth was obtained by Shave (Dermablade or 15 blade) The resulting wound was covered with surgical ointment and bandaged " "appropriately  The patient tolerated the procedure well without complications and was without signs of functional compromise  Specimen has been sent for review by Dermatopathology  Standard post-procedure care has been explained and has been included in written form within the patient's copy of Informed Consent  INFORMED CONSENT DISCUSSION AND POST-OPERATIVE INSTRUCTIONS FOR PATIENT    I   RATIONALE FOR PROCEDURE  I understand that a skin biopsy allows the Dermatologist to test a lesion or rash under the microscope to obtain a diagnosis  It usually involves numbing the area with numbing medication and removing a small piece of skin; sometimes the area will be closed with sutures  In this specific procedure, sutures are not usually needed  If any sutures are placed, then they are usually need to be removed in 2 weeks or less  I understand that my Dermatologist recommends that a skin \"shave\" biopsy be performed today  A local anesthetic, similar to the kind that a dentist uses when filling a cavity, will be injected with a very small needle into the skin area to be sampled  The injected skin and tissue underneath \"will go to sleep” and become numb so no pain should be felt afterwards  An instrument shaped like a tiny \"razor blade\" (shave biopsy instrument) will be used to cut a small piece of tissue and skin from the area so that a sample of tissue can be taken and examined more closely under the microscope  A slight amount of bleeding will occur, but it will be stopped with direct pressure and a pressure bandage and any other appropriate methods  I understands that a scar will form where the wound was created  Surgical ointment will be applied to help protect the wound  Sutures are not usually needed      II   RISKS AND POTENTIAL COMPLICATIONS   I understand the risks and potential complications of a skin biopsy include but are not limited to the " "following:  • Bleeding  • Infection  • Pain  • Scar/keloid  • Skin discoloration  • Incomplete Removal  • Recurrence  • Nerve Damage/Numbness/Loss of Function  • Allergic Reaction to Anesthesia  • Biopsies are diagnostic procedures and based on findings additional treatment or evaluation may be required  • Loss or destruction of specimen resulting in no additional findings    My Dermatologist has explained to me the nature of the condition, the nature of the procedure, and the benefits to be reasonably expected compared with alternative approaches  My Dermatologist has discussed the likelihood of major risks or complications of this procedure including the specific risks listed above, such as bleeding, infection, and scarring/keloid  I understand that a scar is expected after this procedure  I understand that my physician cannot predict if the scar will form a \"keloid,\" which extends beyond the borders of the wound that is created  A keloid is a thick, painful, and bumpy scar  A keloid can be difficult to treat, as it does not always respond well to therapy, which includes injecting cortisone directly into the keloid every few weeks  While this usually reduces the pain and size of the scar, it does not eliminate it  I understand that photographs may be taken before and after the procedure  These will be maintained as part of the medical providers confidential records and may not be made available to me  I further authorize the medical provider to use the photographs for teaching purposes or to illustrate scientific papers, books, or lectures if in his/her judgment, medical research, education, or science may benefit from its use  I have had an opportunity to fully inquire about the risks and benefits of this procedure and its alternatives  I have been given ample time and opportunity to ask questions and to seek a second opinion if I wished to do so    I acknowledge that there have specifically been " "no guarantees as to the cosmetic results from the procedure  I am aware that with any procedure there is always the possibility of an unexpected complication  III  POST-PROCEDURAL CARE (WHAT YOU WILL NEED TO DO \"AFTER THE BIOPSY\" TO OPTIMIZE HEALING)    • Keep the area clean and dry  Try NOT to remove the bandage or get it wet for the first 24 hours  • Gently clean the area and apply surgical ointment (such as Vaseline petrolatum ointment, which is available \"over the counter\" and not a prescription) to the biopsy site for up to 2 weeks straight  This acts to protect the wound from the outside world  • Sutures are not usually placed in this procedure  If any sutures were placed, return for suture removal as instructed (generally 1 week for the face, 2 weeks for the body)  • Take Acetaminophen (Tylenol) for discomfort, if no contraindications  Ibuprofen or aspirin could make bleeding worse  • Call our office immediately for signs of infection: fever, chills, increased redness, warmth, tenderness, discomfort/pain, or pus or foul smell coming from the wound  WHAT TO DO IF THERE IS ANY BLEEDING? If a small amount of bleeding is noticed, place a clean cloth over the area and apply firm pressure for ten minutes  Check the wound after 10 minutes of direct pressure  If bleeding persists, try one more time for an additional 10 minutes of direct pressure on the area  If the bleeding becomes heavier or does not stop after the second attempt, or if you have any other questions about this procedure, then please call your [facility] Dermatologist by calling [phone] (SKIN)  I hereby acknowledge that I have reviewed and verified the site with my Dermatologist and have requested and authorized my Dermatologist to proceed with the procedure  ACTINIC KERATOSIS    Physical Exam:  • Anatomic Location Affected:   Top of scalp   • Morphological Description:  Scaly pink papules  • Pertinent " Positives:  • Pertinent Negatives:      Assessment and Plan:  Based on a thorough discussion of this condition and the management approach to it (including a comprehensive discussion of the known risks, side effects and potential benefits of treatment), the patient (family) agrees to implement the following specific plan:  • When outside we recommend using a wide brim hat, sunglasses, long sleeve and pants, sunscreen with SPF 15+ with reapplication every 2 hours, or SPF specific clothing   liquid nitrogen to treat areas  Consent obtained  Expect area to blister, crust, and then fall off within 2 weeks  Please use vaseline  PROCEDURE:  DESTRUCTION OF PRE-MALIGNANT LESIONS  After a thorough discussion of treatment options and risk/benefits/alternatives (including but not limited to local pain, scarring, dyspigmentation, blistering, and possible superinfection), verbal and written consent were obtained and the aforementioned lesions were treated on with cryotherapy using liquid nitrogen x 1 cycle for 5-10 seconds  TOTAL NUMBER of 2 pre-malignant lesions were treated today on the ANATOMIC LOCATION: top of scalp   The patient tolerated the procedure well, and after-care instructions were provided       Scribe Attestation    I,:  Vale Garg MA am acting as a scribe while in the presence of the attending physician :       I,:  Nii Wright MD personally performed the services described in this documentation    as scribed in my presence :

## 2023-04-05 NOTE — PROGRESS NOTES
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)        Subjective:      Patient ID: Antoinette Aguiar  is a [de-identified] y o  male  Patient is s/p RT CFE on 3/23/23  Pt c/o swelling in the RLE  Pt denies RLE pain, numbness, or tingling  Pt denies fevers or chills  Pt is on Plavix, Atorvastatin, and ASA 81 mg  HPI    The following portions of the patient's history were reviewed and updated as appropriate: allergies, current medications, past family history, past medical history, past social history, past surgical history and problem list     Review of Systems   Cardiovascular: Positive for leg swelling  Musculoskeletal: Negative  Skin: Negative  I have reviewed the ROS as entered and made changes as necessary  Objective:      /62 (BP Location: Left arm, Patient Position: Sitting)   Pulse 74   Resp 18   Ht 5' 10\" (1 778 m)   Wt 79 4 kg (175 lb)   BMI 25 11 kg/m²          Physical Exam    Right groin incision incision is well healed    Right foot ulcers are healing  Triphasic AT and DP signals  Right foot reperfusion edema  "

## 2023-04-05 NOTE — PATIENT INSTRUCTIONS
"Assessment and Plan:  I have discussed with the patient that a sample of skin via a \"skin biopsy” would be potentially helpful to further make a specific diagnosis under the microscope  Based on a thorough discussion of this condition and the management approach to it (including a comprehensive discussion of the known risks, side effects and potential benefits of treatment), the patient (family) agrees to implement the following specific plan:    Procedure:  Skin Biopsy  After a thorough discussion of treatment options and risk/benefits/alternatives (including but not limited to local pain, scarring, dyspigmentation, blistering, possible superinfection, and inability to confirm a diagnosis via histopathology), verbal and written consent were obtained and portion of the rash was biopsied for tissue sample  See below for consent that was obtained from patient and subsequent Procedure Note  I   RATIONALE FOR PROCEDURE  I understand that a skin biopsy allows the Dermatologist to test a lesion or rash under the microscope to obtain a diagnosis  It usually involves numbing the area with numbing medication and removing a small piece of skin; sometimes the area will be closed with sutures  In this specific procedure, sutures are not usually needed  If any sutures are placed, then they are usually need to be removed in 2 weeks or less  I understand that my Dermatologist recommends that a skin \"shave\" biopsy be performed today  A local anesthetic, similar to the kind that a dentist uses when filling a cavity, will be injected with a very small needle into the skin area to be sampled  The injected skin and tissue underneath \"will go to sleep” and become numb so no pain should be felt afterwards    An instrument shaped like a tiny \"razor blade\" (shave biopsy instrument) will be used to cut a small piece of tissue and skin from the area so that a sample of tissue can be taken and examined more closely under the " "microscope  A slight amount of bleeding will occur, but it will be stopped with direct pressure and a pressure bandage and any other appropriate methods  I understands that a scar will form where the wound was created  Surgical ointment will be applied to help protect the wound  Sutures are not usually needed  II   RISKS AND POTENTIAL COMPLICATIONS   I understand the risks and potential complications of a skin biopsy include but are not limited to the following:  Bleeding  Infection  Pain  Scar/keloid  Skin discoloration  Incomplete Removal  Recurrence  Nerve Damage/Numbness/Loss of Function  Allergic Reaction to Anesthesia  Biopsies are diagnostic procedures and based on findings additional treatment or evaluation may be required  Loss or destruction of specimen resulting in no additional findings    My Dermatologist has explained to me the nature of the condition, the nature of the procedure, and the benefits to be reasonably expected compared with alternative approaches  My Dermatologist has discussed the likelihood of major risks or complications of this procedure including the specific risks listed above, such as bleeding, infection, and scarring/keloid  I understand that a scar is expected after this procedure  I understand that my physician cannot predict if the scar will form a \"keloid,\" which extends beyond the borders of the wound that is created  A keloid is a thick, painful, and bumpy scar  A keloid can be difficult to treat, as it does not always respond well to therapy, which includes injecting cortisone directly into the keloid every few weeks  While this usually reduces the pain and size of the scar, it does not eliminate it  I understand that photographs may be taken before and after the procedure  These will be maintained as part of the medical providers confidential records and may not be made available to me    I further authorize the medical provider to use the photographs for " "teaching purposes or to illustrate scientific papers, books, or lectures if in his/her judgment, medical research, education, or science may benefit from its use  I have had an opportunity to fully inquire about the risks and benefits of this procedure and its alternatives  I have been given ample time and opportunity to ask questions and to seek a second opinion if I wished to do so  I acknowledge that there have specifically been no guarantees as to the cosmetic results from the procedure  I am aware that with any procedure there is always the possibility of an unexpected complication  III  POST-PROCEDURAL CARE (WHAT YOU WILL NEED TO DO \"AFTER THE BIOPSY\" TO OPTIMIZE HEALING)    Keep the area clean and dry  Try NOT to remove the bandage or get it wet for the first 24 hours  Gently clean the area and apply surgical ointment (such as Vaseline petrolatum ointment, which is available \"over the counter\" and not a prescription) to the biopsy site for up to 2 weeks straight  This acts to protect the wound from the outside world  Sutures are not usually placed in this procedure  If any sutures were placed, return for suture removal as instructed (generally 1 week for the face, 2 weeks for the body)  Take Acetaminophen (Tylenol) for discomfort, if no contraindications  Ibuprofen or aspirin could make bleeding worse  Call our office immediately for signs of infection: fever, chills, increased redness, warmth, tenderness, discomfort/pain, or pus or foul smell coming from the wound  WHAT TO DO IF THERE IS ANY BLEEDING? If a small amount of bleeding is noticed, place a clean cloth over the area and apply firm pressure for ten minutes  Check the wound after 10 minutes of direct pressure  If bleeding persists, try one more time for an additional 10 minutes of direct pressure on the area    If the bleeding becomes heavier or does not stop after the second attempt, or if you have any other " questions about this procedure, then please call your [facility] [address]'s Dermatologist by calling [phone] (SKIN)  I hereby acknowledge that I have reviewed and verified the site with my Dermatologist and have requested and authorized my Dermatologist to proceed with the procedure  Assessment and Plan:  Based on a thorough discussion of this condition and the management approach to it (including a comprehensive discussion of the known risks, side effects and potential benefits of treatment), the patient (family) agrees to implement the following specific plan:  When outside we recommend using a wide brim hat, sunglasses, long sleeve and pants, sunscreen with SPF 45+ with reapplication every 2 hours, or SPF specific clothing   liquid nitrogen to treat areas  Consent obtained  Expect area to blister, crust, and then fall off within 2 weeks  Please use vaseline

## 2023-04-07 ENCOUNTER — OFFICE VISIT (OUTPATIENT)
Dept: FAMILY MEDICINE CLINIC | Facility: CLINIC | Age: 78
End: 2023-04-07

## 2023-04-07 VITALS
SYSTOLIC BLOOD PRESSURE: 120 MMHG | TEMPERATURE: 97.7 F | DIASTOLIC BLOOD PRESSURE: 52 MMHG | HEART RATE: 58 BPM | HEIGHT: 70 IN | RESPIRATION RATE: 18 BRPM | WEIGHT: 178 LBS | OXYGEN SATURATION: 98 % | BODY MASS INDEX: 25.48 KG/M2

## 2023-04-07 DIAGNOSIS — E11.22 CONTROLLED TYPE 2 DIABETES MELLITUS WITH STAGE 4 CHRONIC KIDNEY DISEASE, WITHOUT LONG-TERM CURRENT USE OF INSULIN (HCC): ICD-10-CM

## 2023-04-07 DIAGNOSIS — N18.4 CONTROLLED TYPE 2 DIABETES MELLITUS WITH STAGE 4 CHRONIC KIDNEY DISEASE, WITHOUT LONG-TERM CURRENT USE OF INSULIN (HCC): ICD-10-CM

## 2023-04-07 DIAGNOSIS — D69.6 PLATELETS DECREASED (HCC): ICD-10-CM

## 2023-04-07 DIAGNOSIS — I10 PRIMARY HYPERTENSION: ICD-10-CM

## 2023-04-07 DIAGNOSIS — I50.32 CHRONIC DIASTOLIC CHF (CONGESTIVE HEART FAILURE) (HCC): ICD-10-CM

## 2023-04-07 DIAGNOSIS — I77.9 AORTO-ILIAC DISEASE (HCC): Primary | Chronic | ICD-10-CM

## 2023-04-07 DIAGNOSIS — I25.10 CORONARY ARTERY DISEASE INVOLVING NATIVE CORONARY ARTERY OF NATIVE HEART WITHOUT ANGINA PECTORIS: ICD-10-CM

## 2023-04-07 DIAGNOSIS — K21.9 GASTROESOPHAGEAL REFLUX DISEASE WITHOUT ESOPHAGITIS: ICD-10-CM

## 2023-04-07 DIAGNOSIS — E78.2 MIXED HYPERLIPIDEMIA: ICD-10-CM

## 2023-04-07 DIAGNOSIS — Z99.11 DEPENDENCE ON RESPIRATOR (VENTILATOR) STATUS (HCC): ICD-10-CM

## 2023-04-07 DIAGNOSIS — G44.209 TENSION HEADACHE: ICD-10-CM

## 2023-04-07 NOTE — PROGRESS NOTES
Name: Christianne Galeazzi  : 1945      MRN: [id number]  Encounter Provider: Zoltan Jay MD  Encounter Date: 2023   Encounter department: [address] [place] [address] Place     1  Aorto-iliac disease [facility])  Assessment & Plan:  Patient is stable  and will continue present plan of care and reassess at next routine visit  All questions about this problem from patient were answered today  2  Primary hypertension  Assessment & Plan:  Patient is stable with current anti-hypertensive medicine and continue to follow a low sodium diet and take current medication  All questions about this condition were answered today  3  Coronary artery disease involving native coronary artery of native heart without angina pectoris  Assessment & Plan:  Patient to continue  with current cardiac meds to decrease risk of re stenosis  Patient to follow up with cardiology for scheduled appointments to decrease risk for further cardiac problems with appropriate diagnostic testing to reassess cardiac status  Patient had alll questions about this problem answered today  4  Tension headache  Assessment & Plan:  Patient is stable  and will continue present plan of care and reassess at next routine visit  All questions about this problem from patient were answered today  5  Controlled type 2 diabetes mellitus with stage 4 chronic kidney disease, without long-term current use of insulin ([facility] )  Assessment & Plan:  Patient is stable with current meds and discussed a low carb diet  Pt  recommended to see eye doctor and foot doctor for routine screening as per protocol  Recheck A1C  and Cr in 3 months  Patient questions answered today about this condtion  Lab Results   Component Value Date    HGBA1C 7 6 (H) 02/10/2023       Orders:  -     Hemoglobin A1C; Future; Expected date: 2023  -     Comprehensive metabolic panel;  Future; Expected date: 2023  -     Hemoglobin A1C; Future; Expected date: 07/07/2023  -     Comprehensive metabolic panel; Future; Expected date: 07/07/2023  -     metFORMIN (GLUCOPHAGE) 500 mg tablet; Take 1 tablet (500 mg total) by mouth 2 (two) times a day with meals    6  Gastroesophageal reflux disease without esophagitis  Assessment & Plan:  Patient to continue with present therapy and decrease caffeine, avoid ETOH and smoking to decrease acid production  Pt should also cease eating prior to bedtime and avoid excessive fluid intake prior to sleep  May use antacids as needed for breakthrough GERD  All pateint questions answered today about this condition  7  Mixed hyperlipidemia  Assessment & Plan:  Patient  is stable with current medication and we discussed a low fat low cholesterol diet  Weight loss also discussed for this will help lower cholesterol also  Recheck lipids in 6 months  8  Chronic diastolic CHF (congestive heart failure) (HCC)  Assessment & Plan:  Wt Readings from Last 3 Encounters:   04/05/23 79 4 kg (175 lb)   04/05/23 79 4 kg (175 lb)   03/23/23 82 1 kg (181 lb)   Patient is stable  and will continue present plan of care and reassess at next routine visit  All questions about this problem from patient were answered today  9  Dependence on respirator (ventilator) status ([facility] )    10  Platelets decreased ([facility] [address] )        Falls Plan of Care: balance, strength, and gait training instructions were provided  Home safety education provided         TCM Call     Date and time call was made  3/28/2023 11:14 AM    Hospital care reviewed  Records reviewed    Patient was hospitialized at  [facility] - CLOSED    Date of Admission  03/23/23    Date of discharge  03/27/23    Diagnosis  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 No    Scheduled for follow up? Yes    Patients specialists  Cardiologist    Cardiologist name  Dr Edie Gaffney or Lexii Ozuna    Nephrologist name  Abhishek Ponce MD    Did you obtain your prescribed medications  Yes    Do you need help managing your prescriptions or medications  No    Is transportation to your appointment needed  No    I have advised the patient to call PCP with any new or worsening symptoms  [address]  Family members    Support System  Family    The type of support provided  Emotional; Physical    Do you have social support  Yes, as much as I need    Comment  Pt lives alone, Niece lives in apartment below, brother lives in house behind his  Are you recieving any outpatient services  No    Are you recieving home care services  Yes    Types of home care services  Nurse visit    Are you using any community resources  No    Current waiver services  No    Have you fallen in the last 12 months  No    Interperter language line needed  No          Subjective     Is an 49-year-old male here today for TCM visit and checkup on multiple medical problems patient with coronary disease peripheral vascular disease recently had Mohs surgery for squamous cell type tissue on left ear as well as recently had surgery on his right ear  Patient is doing well since he was hospitalized although his sugars have been on the high side A1c has been slowly coming back up and he is inquiring about getting back on metformin which will put him back on 500 mg twice a day and recheck him back in approximately 3 months  Review of Systems   Constitutional: Negative for activity change, appetite change, chills, fatigue, fever and unexpected weight change     HENT: Negative for congestion, ear pain, hearing loss, mouth sores, postnasal drip, sinus pressure, sinus pain, sneezing and sore throat  Respiratory: Negative for apnea, cough, shortness of breath and wheezing  Cardiovascular: Negative for chest pain, palpitations and leg swelling  Gastrointestinal: Negative for abdominal pain, constipation, diarrhea, nausea and vomiting  Endocrine: Negative for cold intolerance and heat intolerance  Genitourinary: Negative for dysuria, frequency and hematuria  Musculoskeletal: Negative for arthralgias, back pain, gait problem, joint swelling and neck pain  Skin: Negative for rash  Neurological: Negative for dizziness, weakness and numbness  Hematological: Does not bruise/bleed easily  Psychiatric/Behavioral: Negative for agitation, behavioral problems, confusion, hallucinations and sleep disturbance  The patient is not nervous/anxious  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 ([facility] ) 09/10/2021   • 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 ([facility] ) 2/24/2023   • Basal cell carcinoma     right cheek   • CAD (coronary artery disease)    • Carotid stenosis, asymptomatic, bilateral    • Chronic kidney disease    • Colon polyp    • Diabetes ([facility] )     type 2, non-insulin dependent   • Diabetes mellitus (HCC)    • GERD (gastroesophageal reflux disease)    • History of nephrolithiasis    • Hyperlipidemia    • Hypertension    • Left foot pain 11/30/2022   • PAD (peripheral artery disease) ([facility])    • Severe aortic stenosis    • Squamous cell skin cancer 11/22/2022    left superior helix     Past Surgical History:   Procedure Laterality Date   • APPENDECTOMY     • CARDIAC CATHETERIZATION N/A 05/05/2022    Procedure: CARDIAC RHC/LHC; Surgeon: Tank Whalen DO;  Location: [facility];   Service: Cardiology   • CARDIAC CATHETERIZATION N/A 05/05/2022 Procedure: Cardiac Coronary Angiogram;  Surgeon: Rosanne Guillory DO;  Location: [facility]; Service: Cardiology   • CARDIAC CATHETERIZATION N/A 05/24/2022    Procedure: Cardiac pci;  Surgeon: Alyse Vaca MD;  Location[facility];   Service: Cardiology   • CARDIAC CATHETERIZATION N/A 06/14/2022    Procedure: CARDIAC TAVR;  Surgeon: Dorothea Chandler MD;  Location: [facility];  Service: Cardiology   • COLECTOMY     • COLONOSCOPY  2013   • CORONARY ARTERY BYPASS GRAFT  2013    X 2   • FEMORAL ARTERY - POPLITEAL ARTERY BYPASS GRAFT     • HEMORRHOID SURGERY     • IR AORTAGRAM WITH RUN-OFF  11/19/2018   • IR AORTAGRAM WITH RUN-OFF  3/2/2023   • IR LOWER EXTREMITY ANGIOGRAM  3/23/2023   • MOHS SURGERY Left 01/11/2023    SCC left superior helix-Dr Tovar   • [place] SLCTV CATHJ 3RD+ ORD SLCTV ABDL PEL/LXTR [facility] Left 08/12/2016    Procedure: LEFT FEMORAL ARTERIOGRAM; BALLOON ANGIOPLASTY; SFA  AND FEMORAL AT VEIN GRAFT;  Surgeon: Oni Warren MD;  Location: [facility];  Service: Vascular   • [place] TEAEC W/WO PATCH GRAFT COMMON FEMORAL Right 3/23/2023    Procedure: Common femoral endarterectomy&antegrade intervention of SFA, popliteal artery w/ shockwave;  Surgeon: Angeli Aguilar MD;  Location: [facility];  Service: Vascular   • [place] TRANSCATHETER TRANSAPICAL REPLACEMT AORTIC VALVE N/A 06/14/2022    Procedure: REPLACEMENT AORTIC VALVE TRANSCATHETER (TAVR) TRANSAPICAL 29MM IRVIN KYLER S3 ULTRA VALVE(ACCESS ON LEFT) ELANA;  Surgeon: Mikey Yao DO;  Location: [facility];  Service: Cardiac Surgery   • SKIN CANCER EXCISION     • TONSILLECTOMY AND ADENOIDECTOMY       Family History   Problem Relation Age of Onset   • Heart attack Father    • Other Sister         bypass and vlave replacement   • Stroke Paternal Uncle    • Arrhythmia Neg Hx    • Asthma Neg Hx    • Clotting disorder Neg Hx    • Fainting Neg Hx    • Anuerysm Neg Hx    • Hypertension Neg Hx         unsure    • Hyperlipidemia Neg Hx    • Heart failure Neg Hx      Social History     Socioeconomic History   • Marital status:      Spouse name: None   • Number of children: None   • Years of education: None   • Highest education level: None   Occupational History   • None   Tobacco Use   • Smoking status: Former     Packs/day: 1 00     Years: 50 00     Pack years: 50 00     Types: Cigarettes     Quit date: 3/3/2023     Years since quittin 0   • Smokeless tobacco: Never   Vaping Use   • Vaping Use: Never used   Substance and Sexual Activity   • Alcohol use: Not Currently     Comment: rare   • Drug use: No   • Sexual activity: None   Other Topics Concern   • None   Social History Narrative    · Most recent tobacco use screenin2018      · Do you currently or have you served in the [employer]: Yes      · If Yes, What branch of service:   [employer]      · Occupation:   Dentists      · Exercise level:   Occasional        · Caffeine intake:   Heavy      · Marital status:         · Diet:   Regular  low fat     · Seat belts used routinely:   Yes      · Smoke alarm in home: Yes      · Advance directive: Yes      · General stress level:   Low      Social Determinants of Health     Financial Resource Strain: Not on file   Food Insecurity: No Food Insecurity   • Worried About Running Out of Food in the Last Year: Never true   • Ran Out of Food in the Last Year: Never true   Transportation Needs: No Transportation Needs   • Lack of Transportation (Medical): No   • Lack of Transportation (Non-Medical):  No   Physical Activity: Not on file   Stress: Not on file   Social Connections: Not on file   Intimate Partner Violence: Not on file   Housing Stability: Low Risk    • Unable to Pay for Housing in the Last Year: No   • Number of Places Lived in the Last Year: 1   • Unstable Housing in the Last Year: No     Current Outpatient Medications on File Prior to Visit   Medication Sig   • acetaminophen (TYLENOL) 325 mg tablet Take 3 tablets (975 mg total) by mouth every 8 (eight) hours   • allopurinol (ZYLOPRIM) 300 mg tablet Take 1 tablet (300 mg total) by mouth daily   • amLODIPine (NORVASC) 10 mg tablet TAKE 1 TABLET DAILY   • AMYLASE-LIPASE-PROTEASE PO Take by mouth 2 (two) times a day before meals   • Ascorbic Acid (vitamin C) 100 MG tablet Take 250 mg by mouth daily   • aspirin (ECOTRIN LOW STRENGTH) 81 mg EC tablet Take 81 mg by mouth daily   • atorvastatin (LIPITOR) 80 mg tablet TAKE 1 TABLET DAILY AT     BEDTIME   • calcitriol (ROCALTROL) 0 25 mcg capsule Take 1 capsule (0 25 mcg total) by mouth 3 (three) times a week   • clopidogrel (PLAVIX) 75 mg tablet Take 1 tablet (75 mg total) by mouth in the morning     • Cyanocobalamin (VITAMIN B12 PO) Take by mouth once a week     • docusate sodium (COLACE) 100 mg capsule Take 1 capsule (100 mg total) by mouth 2 (two) times a day   • lisinopril (ZESTRIL) 10 mg tablet Take 1 tablet (10 mg total) by mouth daily   • Magnesium Oxide (MAG-200 PO) Take by mouth once a week   • metoprolol succinate (TOPROL-XL) 25 mg 24 hr tablet Take 0 5 tablets (12 5 mg total) by mouth daily   • pancrelipase, Lip-Prot-Amyl, (CREON) 24,000 units Take 24,000 units of lipase by mouth 3 (three) times a day with meals   • pantoprazole (PROTONIX) 40 mg tablet TAKE 1 TABLET DAILY   • potassium chloride (K-DUR,KLOR-CON) 10 mEq tablet Take 1 tablet (10 mEq total) by mouth daily   • torsemide (DEMADEX) 20 mg tablet Take 1 tablet (20 mg total) by mouth 2 (two) times a day (Patient taking differently: Take 10 mg by mouth daily)   • [] oxyCODONE (ROXICODONE) 5 immediate release tablet Take 1 tablet (5 mg total) by mouth every 6 (six) hours as needed for moderate pain for up to 10 days Max Daily Amount: 20 mg     No Known Allergies  Immunization History   Administered Date(s) Administered   • COVID-19 PFIZER VACCINE 0 3 ML IM 2021, 2021, 2021   • COVID-19 Pfizer Vac BIVALENT Stuart-sucrose 12 Yr+ IM (BOOSTER ONLY) "09/29/2022   • H1N1 Inj 11/15/2020   • H1N1, All Formulations 11/15/2020   • INFLUENZA 10/04/2011, 10/19/2012, 10/16/2014, 10/13/2016, 10/06/2018, 11/11/2021, 10/19/2022   • Influenza Split High Dose Preservative Free IM 10/06/2018, 11/01/2019   • Pneumococcal Conjugate 13-Valent 12/04/2015   • Pneumococcal Polysaccharide PPV23 06/11/2019   • Tdap 10/15/2021       Objective     /52 (BP Location: Left arm, Patient Position: Sitting, Cuff Size: Standard)   Pulse 58   Temp 97 7 °F (36 5 °C) (Temporal)   Resp 18   Ht 5' 10\" (1 778 m)   Wt 80 7 kg (178 lb)   SpO2 98%   BMI 25 54 kg/m²     Physical Exam  Vitals and nursing note reviewed  Constitutional:       Appearance: He is well-developed  HENT:      Head: Normocephalic and atraumatic  Nose: Nose normal       Mouth/Throat:      Mouth: Mucous membranes are moist    Eyes:      General: No scleral icterus  Conjunctiva/sclera: Conjunctivae normal       Pupils: Pupils are equal, round, and reactive to light  Neck:      Thyroid: No thyromegaly  Cardiovascular:      Rate and Rhythm: Normal rate and regular rhythm  Heart sounds: Normal heart sounds  Pulmonary:      Effort: Pulmonary effort is normal  No respiratory distress  Breath sounds: Normal breath sounds  No wheezing  Abdominal:      General: Bowel sounds are normal       Palpations: Abdomen is soft  Tenderness: There is no abdominal tenderness  There is no guarding or rebound  Musculoskeletal:         General: Normal range of motion  Cervical back: Normal range of motion and neck supple  Skin:     General: Skin is warm and dry  Findings: No rash  Neurological:      Mental Status: He is alert and oriented to person, place, and time  Psychiatric:         Mood and Affect: Mood normal          Behavior: Behavior normal          Thought Content:  Thought content normal          Judgment: Judgment normal        Bhargav Brice MD  "

## 2023-04-07 NOTE — ASSESSMENT & PLAN NOTE
Wt Readings from Last 3 Encounters:   04/05/23 79 4 kg (175 lb)   04/05/23 79 4 kg (175 lb)   03/23/23 82 1 kg (181 lb)   Patient is stable  and will continue present plan of care and reassess at next routine visit  All questions about this problem from patient were answered today

## 2023-04-07 NOTE — ASSESSMENT & PLAN NOTE
Patient is stable with current meds and discussed a low carb diet  Pt  recommended to see eye doctor and foot doctor for routine screening as per protocol  Recheck A1C  and Cr in 3 months  Patient questions answered today about this condtion    Lab Results   Component Value Date    HGBA1C 7 6 (H) 02/10/2023

## 2023-04-11 NOTE — RESULT ENCOUNTER NOTE
DERMATOPATHOLOGY RESULT NOTE    Results reviewed by ordering physician  Instructions for Clinical Derm Team:   (remember to route Result Note to appropriate staff):    Call patient and explain result  No sign of skin cancer  It is a benign finding from sleeping on that side, putting pressure on that ear  Should be better after biopsy      Result & Plan by Specimen:    Specimen A: benign  Plan: monitor and reassured, benign

## 2023-04-25 ENCOUNTER — OFFICE VISIT (OUTPATIENT)
Dept: DERMATOLOGY | Facility: CLINIC | Age: 78
End: 2023-04-25

## 2023-04-25 VITALS — HEIGHT: 70 IN | WEIGHT: 175.04 LBS | BODY MASS INDEX: 25.06 KG/M2

## 2023-04-25 DIAGNOSIS — D18.01 CHERRY ANGIOMA: ICD-10-CM

## 2023-04-25 DIAGNOSIS — D22.70 MULTIPLE BENIGN MELANOCYTIC NEVI OF UPPER AND LOWER EXTREMITIES AND TRUNK: Primary | ICD-10-CM

## 2023-04-25 DIAGNOSIS — L81.4 SOLAR LENTIGO: ICD-10-CM

## 2023-04-25 DIAGNOSIS — L57.0 ACTINIC KERATOSIS: ICD-10-CM

## 2023-04-25 DIAGNOSIS — L85.3 XEROSIS OF SKIN: ICD-10-CM

## 2023-04-25 DIAGNOSIS — D22.60 MULTIPLE BENIGN MELANOCYTIC NEVI OF UPPER AND LOWER EXTREMITIES AND TRUNK: Primary | ICD-10-CM

## 2023-04-25 DIAGNOSIS — D22.5 MULTIPLE BENIGN MELANOCYTIC NEVI OF UPPER AND LOWER EXTREMITIES AND TRUNK: Primary | ICD-10-CM

## 2023-04-25 DIAGNOSIS — L82.1 SEBORRHEIC KERATOSIS: ICD-10-CM

## 2023-04-25 NOTE — PROGRESS NOTES
"[facility] Note     Patient Name: Rhonda Perez  Encounter Date: 04/25/2023     Have you been cared for by a [place] [id number] Dermatologist in the last 3 years and, if so, which description applies to you? Yes  I have been here within the last 3 years, and my medical history has NOT changed since that time  I am MALE/not capable of bearing children  REVIEW OF SYSTEMS:  Have you recently had or currently have any of the following? · No changes in my recent health  PAST MEDICAL HISTORY:  Have you personally ever had or currently have any of the following? If \"YES,\" then please provide more detail  · No changes in my medical history  FAMILY HISTORY:  Any \"first degree relatives\" (parent, brother, sister, or child) with the following? • No changes in my family's known health  PATIENT EXPERIENCE:    • Do you want the Dermatologist to perform a COMPLETE skin exam today including a clinical examination under the \"bra and underwear\" areas? Yes  • If necessary, do we have your permission to call and leave a detailed message on your Preferred Phone number that includes your specific medical information?   NO      No Known Allergies   Current Outpatient Medications:   •  acetaminophen (TYLENOL) 325 mg tablet, Take 3 tablets (975 mg total) by mouth every 8 (eight) hours, Disp: , Rfl: 0  •  allopurinol (ZYLOPRIM) 300 mg tablet, Take 1 tablet (300 mg total) by mouth daily, Disp: 90 tablet, Rfl: 3  •  amLODIPine (NORVASC) 10 mg tablet, TAKE 1 TABLET DAILY, Disp: 90 tablet, Rfl: 3  •  AMYLASE-LIPASE-PROTEASE PO, Take by mouth 2 (two) times a day before meals, Disp: , Rfl:   •  Ascorbic Acid (vitamin C) 100 MG tablet, Take 250 mg by mouth daily, Disp: , Rfl:   •  aspirin (ECOTRIN LOW STRENGTH) 81 mg EC tablet, Take 81 mg by mouth daily, Disp: , Rfl:   •  atorvastatin (LIPITOR) 80 mg tablet, TAKE 1 TABLET DAILY AT     BEDTIME, Disp: 90 tablet, Rfl: 3  •  calcitriol (ROCALTROL) 0 25 mcg capsule, " "Take 1 capsule (0 25 mcg total) by mouth 3 (three) times a week, Disp: 36 capsule, Rfl: 4  •  clopidogrel (PLAVIX) 75 mg tablet, Take 1 tablet (75 mg total) by mouth in the morning , Disp: 90 tablet, Rfl: 3  •  Cyanocobalamin (VITAMIN B12 PO), Take by mouth once a week  , Disp: , Rfl:   •  docusate sodium (COLACE) 100 mg capsule, Take 1 capsule (100 mg total) by mouth 2 (two) times a day, Disp: , Rfl: 0  •  lisinopril (ZESTRIL) 10 mg tablet, Take 1 tablet (10 mg total) by mouth daily, Disp: 90 tablet, Rfl: 3  •  Magnesium Oxide (MAG-200 PO), Take by mouth once a week, Disp: , Rfl:   •  metFORMIN (GLUCOPHAGE) 500 mg tablet, Take 1 tablet (500 mg total) by mouth 2 (two) times a day with meals, Disp: 180 tablet, Rfl: 1  •  metoprolol succinate (TOPROL-XL) 25 mg 24 hr tablet, Take 0 5 tablets (12 5 mg total) by mouth daily, Disp: 45 tablet, Rfl: 1  •  pancrelipase, Lip-Prot-Amyl, (CREON) 24,000 units, Take 24,000 units of lipase by mouth 3 (three) times a day with meals, Disp: 300 capsule, Rfl: 2  •  pantoprazole (PROTONIX) 40 mg tablet, TAKE 1 TABLET DAILY, Disp: 90 tablet, Rfl: 1  •  potassium chloride (K-DUR,KLOR-CON) 10 mEq tablet, Take 1 tablet (10 mEq total) by mouth daily, Disp: 90 tablet, Rfl: 3  •  torsemide (DEMADEX) 20 mg tablet, Take 1 tablet (20 mg total) by mouth 2 (two) times a day (Patient taking differently: Take 10 mg by mouth daily), Disp: 180 tablet, Rfl: 3          • Whom besides the patient is providing clinical information about today's encounter?   o NO ADDITIONAL HISTORIAN (patient alone provided history)    Physical Exam and Assessment/Plan by Diagnosis:    1   MELANOCYTIC NEVI (\"Moles\")    Physical Exam:  • Anatomic Location Affected:   Mostly on sun-exposed areas of the trunk and extremities  • Morphological Description:  Scattered, 1-4mm round to ovoid, symmetrical-appearing, even bordered, skin colored to dark brown macules/papules, mostly in sun-exposed areas  • Pertinent " "Positives:  • Pertinent Negatives: Additional History of Present Condition: Patient present for skin exam       Assessment and Plan:  Based on a thorough discussion of this condition and the management approach to it (including a comprehensive discussion of the known risks, side effects and potential benefits of treatment), the patient (family) agrees to implement the following specific plan:  • When outside we recommend using a wide brim hat, sunglasses, long sleeve and pants, sunscreen with SPF 12+ with reapplication every 2 hours, or SPF specific clothing   • Benign, reassured  • Annual skin check     Melanocytic Nevi  Melanocytic nevi (\"moles\") are tan or brown, raised or flat areas of the skin which have an increased number of melanocytes  Melanocytes are the cells in our body which make pigment and account for skin color  Some moles are present at birth (I e , \"congenital nevi\"), while others come up later in life (i e , \"acquired nevi\")  The sun can stimulate the body to make more moles  Sunburns are not the only thing that triggers more moles  Chronic sun exposure can do it too  Clinically distinguishing a healthy mole from melanoma may be difficult, even for experienced dermatologists  The \"ABCDE's\" of moles have been suggested as a means of helping to alert a person to a suspicious mole and the possible increased risk of melanoma  The suggestions for raising alert are as follows:    Asymmetry: Healthy moles tend to be symmetric, while melanomas are often asymmetric  Asymmetry means if you draw a line through the mole, the two halves do not match in color, size, shape, or surface texture  Asymmetry can be a result of rapid enlargement of a mole, the development of a raised area on a previously flat lesion, scaling, ulceration, bleeding or scabbing within the mole    Any mole that starts to demonstrate \"asymmetry\" should be examined promptly by a board certified dermatologist      Babs Cool: " "Healthy moles tend to have discrete, even borders  The border of a melanoma often blends into the normal skin and does not sharply delineate the mole from normal skin  Any mole that starts to demonstrate \"uneven borders\" should be examined promptly by a board certified dermatologist      Color: Healthy moles tend to be one color throughout  Melanomas tend to be made up of different colors ranging from dark black, blue, white, or red  Any mole that demonstrates a color change should be examined promptly by a board certified dermatologist      Diameter: Healthy moles tend to be smaller than 0 6 cm in size; an exception are \"congenital nevi\" that can be larger  Melanomas tend to grow and can often be greater than 0 6 cm (1/4 of an inch, or the size of a pencil eraser)  This is only a guideline, and many normal moles may be larger than 0 6 cm without being unhealthy  Any mole that starts to change in size (small to bigger or bigger to smaller) should be examined promptly by a board certified dermatologist      Evolving: Healthy moles tend to \"stay the same  \"  Melanomas may often show signs of change or evolution such as a change in size, shape, color, or elevation  Any mole that starts to itch, bleed, crust, burn, hurt, or ulcerate or demonstrate a change or evolution should be examined promptly by a board certified dermatologist         2  LENTIGO    Physical Exam:  • Anatomic Location Affected:  Upper/lower extremities and back   • Morphological Description:  Light brown macules  • Pertinent Positives:  • Pertinent Negatives:     Additional History of Present Condition: Patient present for skin exam       Assessment and Plan:  Based on a thorough discussion of this condition and the management approach to it (including a comprehensive discussion of the known risks, side effects and potential benefits of treatment), the patient (family) agrees to implement the following specific plan:  • When outside we recommend " using a wide brim hat, sunglasses, long sleeve and pants, sunscreen with SPF 06+ with reapplication every 2 hours, or SPF specific clothing       What is a lentigo? A lentigo is a pigmented flat or slightly raised lesion with a clearly defined edge  Unlike an ephelis (freckle), it does not fade in the winter months  There are several kinds of lentigo  The name lentigo originally referred to its appearance resembling a small lentil  The plural of lentigo is lentigines, although “lentigos” is also in common use  Who gets lentigines? Lentigines can affect males and females of all ages and races  Solar lentigines are especially prevalent in fair skinned adults  Lentigines associated with syndromes are present at birth or arise during childhood  What causes lentigines? Common forms of lentigo are due to exposure to ultraviolet radiation:  • Sun damage including sunburn   • Indoor tanning   • Phototherapy, especially photochemotherapy (PUVA)    Ionizing radiation, eg radiation therapy, can also cause lentigines  Several familial syndromes associated with widespread lentigines originate from mutations in Aung-MAP kinase, mTOR signaling and PTEN pathways  What is the treatment for lentigines? Most lentigines are left alone  Attempts to lighten them may not be successful  The following approaches are used:  • SPF 50+ broad-spectrum sunscreen   • Hydroquinone bleaching cream   • Alpha hydroxy acids   • Vitamin C   • Retinoids   • Azelaic acid   • Chemical peels  Individual lesions can be permanently removed using:  • Cryotherapy   • Intense pulsed light   • Pigment lasers    How can lentigines be prevented? Lentigines associated with exposure ultraviolet radiation can be prevented by very careful sun protection  Clothing is more successful at preventing new lentigines than are sunscreens  What is the outlook for lentigines? Lentigines usually persist  They may increase in number with age and sun exposure  "Some in sun-protected sites may fade and disappear  3  RINALDI ANGIOMAS    Physical Exam:  • Anatomic Location Affected:  trunk  • Morphological Description:  Scattered cherry red, 1-4 mm papules  • Pertinent Positives:  • Pertinent Negatives: Additional History of Present Condition:  Patient present for skin exam      Assessment and Plan:  Based on a thorough discussion of this condition and the management approach to it (including a comprehensive discussion of the known risks, side effects and potential benefits of treatment), the patient (family) agrees to implement the following specific plan:  • Monitor for changes  • Benign, reassured  •     Assessment and Plan:    Cherry angioma, also known as Sarmad Milch spots, are benign vascular skin lesions  A \"cherry angioma\" is a firm red, blue or purple papule, 0 1-1 cm in diameter  When thrombosed, they can appear black in colour until evaluated with a dermatoscope when the red or purple colour is more easily seen  Cherry angioma may develop on any part of the body but most often appear on the scalp, face, lips and trunk  An angioma is due to proliferating endothelial cells; these are the cells that line the inside of a blood vessel  Angiomas can arise in early life or later in life; the most common type of angioma is a cherry angioma  Cherry angiomas are very common in males and females of any age or race  They are more noticeable in white skin than in skin of colour  They markedly increase in number from about the age of 36  There may be a family history of similar lesions  Eruptive cherry angiomas have been rarely reported to be associated with internal malignancy  The cause of angiomas is unknown  Genetic analysis of cherry angiomas has shown that they frequently carry specific somatic missense mutations in the GNAQ and GNA11 (Q209H) genes, which are involved in other vascular and melanocytic proliferations        4  SEBORRHEIC KERATOSIS; " "NON-INFLAMED    Physical Exam:  • Anatomic Location Affected:  trunk  • Morphological Description:  Flat and raised, waxy, smooth to warty textured, yellow to brownish-grey to dark brown to blackish, discrete, \"stuck-on\" appearing papules  • Pertinent Positives:  • Pertinent Negatives: Additional History of Present Condition:      Assessment and Plan:  Based on a thorough discussion of this condition and the management approach to it (including a comprehensive discussion of the known risks, side effects and potential benefits of treatment), the patient (family) agrees to implement the following specific plan:  • Monitor for changes  • Benign, reassured  •     Seborrheic Keratosis  A seborrheic keratosis is a harmless warty spot that appears during adult life as a common sign of skin aging  Seborrheic keratoses can arise on any area of skin, covered or uncovered, with the usual exception of the palms and soles  They do not arise from mucous membranes  Seborrheic keratoses can have highly variable appearance  Seborrheic keratoses are extremely common  It has been estimated that over 90% of adults over the age of 61 years have one or more of them  They occur in males and females of all races, typically beginning to erupt in the 35s or 45s  They are uncommon under the age of 21 years  The precise cause of seborrhoeic keratoses is not known  Seborrhoeic keratoses are considered degenerative in nature  As time goes by, seborrheic keratoses tend to become more numerous  Some people inherit a tendency to develop a very large number of them; some people may have hundreds of them  There is no easy way to remove multiple lesions on a single occasion  Unless a specific lesion is \"inflamed\" and is causing pain or stinging/burning or is bleeding, most insurance companies do not authorize treatment      5  XEROSIS (\"DRY SKIN\")    Physical Exam:  • Anatomic Location Affected:  diffuse  • Morphological Description:  " xerosis  • Pertinent Positives:  • Pertinent Negatives: Additional History of Present Condition:      Assessment and Plan:  Based on a thorough discussion of this condition and the management approach to it (including a comprehensive discussion of the known risks, side effects and potential benefits of treatment), the patient (family) agrees to implement the following specific plan:  • Use moisturizer like Eucerin,Cerave or Aveeno Cream 3 times a day for the dry skin   •   •    •     Dry skin refers to skin that feels dry to touch  Dry skin has a dull surface with a rough, scaly quality  The skin is less pliable and cracked  When dryness is severe, the skin may become inflamed and fissured  Although any body site can be dry, dry skin tends to affect the shins more than any other site  Dry skin is lacking moisture in the outer horny cell layer (stratum corneum) and this results in cracks in the skin surface  Dry skin is also called xerosis, xeroderma or asteatosis (lack of fat)  It can affect males and females of all ages  There is some racial variability in water and lipid content of the skin  • Dry skin that starts in early childhood may be one of about 20 types of ichthyosis (fish-scale skin)  There is often a family history of dry skin  • Dry skin is commonly seen in people with atopic dermatitis  • Nearly everyone > 60 years has dry skin  Dry skin that begins later may be seen in people with certain diseases and conditions  • Postmenopausal women  • Hypothyroidism  • Chronic renal disease   • Malnutrition and weight loss   • Subclinical dermatitis   • Treatment with certain drugs such as oral retinoids, diuretics and epidermal growth factor receptor inhibitors      What is the treatment for dry skin? The mainstay of treatment of dry skin and ichthyosis is moisturisers/emollients   They should be applied liberally and often enough to:  • Reduce itch   • Improve the barrier function   • Prevent entry of irritants, bacteria   • Reduce transepidermal water loss  How can dry skin be prevented? Eliminate aggravating factors:  • Reduce the frequency of bathing  • A humidifier in winter and air conditioner in summer   • Compare having a short shower with a prolonged soak in a bath  • Use lukewarm, not hot, water  • Replace standard soap with a substitute such as a synthetic detergent cleanser, water-miscible emollient, bath oil, anti-pruritic tar oil, colloidal oatmeal etc    • Apply an emollient liberally and often, particularly shortly after bathing, and when itchy  The drier the skin, the thicker this should be, especially on the hands  What is the outlook for dry skin? A tendency to dry skin may persist life-long, or it may improve once contributing factors are controlled      6  ACTINIC KERATOSIS    Physical Exam:  • Anatomic Location Affected:  Right temple; Left eyebrow and nose  • Morphological Description:  Scaly pink papules  • Pertinent Positives:  • Pertinent Negatives:      Assessment and Plan:  Based on a thorough discussion of this condition and the management approach to it (including a comprehensive discussion of the known risks, side effects and potential benefits of treatment), the patient (family) agrees to implement the following specific plan:  • When outside we recommend using a wide brim hat, sunglasses, long sleeve and pants, sunscreen with SPF 76+ with reapplication every 2 hours, or SPF specific clothing   • monitor   • Follow up in 6 months for skin exam      Scribe Attestation    I,:  Azul Jeffries MA am acting as a scribe while in the presence of the attending physician :       I,:  Ly Arroyo MD personally performed the services described in this documentation    as scribed in my presence :

## 2023-04-25 NOTE — PATIENT INSTRUCTIONS
"1  MELANOCYTIC NEVI (\"Moles\")    Assessment and Plan:  Based on a thorough discussion of this condition and the management approach to it (including a comprehensive discussion of the known risks, side effects and potential benefits of treatment), the patient (family) agrees to implement the following specific plan:  When outside we recommend using a wide brim hat, sunglasses, long sleeve and pants, sunscreen with SPF 83+ with reapplication every 2 hours, or SPF specific clothing   Benign, reassured  Annual skin check     Melanocytic Nevi  Melanocytic nevi (\"moles\") are tan or brown, raised or flat areas of the skin which have an increased number of melanocytes  Melanocytes are the cells in our body which make pigment and account for skin color  Some moles are present at birth (I e , \"congenital nevi\"), while others come up later in life (i e , \"acquired nevi\")  The sun can stimulate the body to make more moles  Sunburns are not the only thing that triggers more moles  Chronic sun exposure can do it too  Clinically distinguishing a healthy mole from melanoma may be difficult, even for experienced dermatologists  The \"ABCDE's\" of moles have been suggested as a means of helping to alert a person to a suspicious mole and the possible increased risk of melanoma  The suggestions for raising alert are as follows:    Asymmetry: Healthy moles tend to be symmetric, while melanomas are often asymmetric  Asymmetry means if you draw a line through the mole, the two halves do not match in color, size, shape, or surface texture  Asymmetry can be a result of rapid enlargement of a mole, the development of a raised area on a previously flat lesion, scaling, ulceration, bleeding or scabbing within the mole  Any mole that starts to demonstrate \"asymmetry\" should be examined promptly by a board certified dermatologist      Border: Healthy moles tend to have discrete, even borders    The border of a melanoma often blends " "into the normal skin and does not sharply delineate the mole from normal skin  Any mole that starts to demonstrate \"uneven borders\" should be examined promptly by a board certified dermatologist      Color: Healthy moles tend to be one color throughout  Melanomas tend to be made up of different colors ranging from dark black, blue, white, or red  Any mole that demonstrates a color change should be examined promptly by a board certified dermatologist      Diameter: Healthy moles tend to be smaller than 0 6 cm in size; an exception are \"congenital nevi\" that can be larger  Melanomas tend to grow and can often be greater than 0 6 cm (1/4 of an inch, or the size of a pencil eraser)  This is only a guideline, and many normal moles may be larger than 0 6 cm without being unhealthy  Any mole that starts to change in size (small to bigger or bigger to smaller) should be examined promptly by a board certified dermatologist      Evolving: Healthy moles tend to \"stay the same  \"  Melanomas may often show signs of change or evolution such as a change in size, shape, color, or elevation  Any mole that starts to itch, bleed, crust, burn, hurt, or ulcerate or demonstrate a change or evolution should be examined promptly by a board certified dermatologist       2  LENTIGO    Assessment and Plan:  Based on a thorough discussion of this condition and the management approach to it (including a comprehensive discussion of the known risks, side effects and potential benefits of treatment), the patient (family) agrees to implement the following specific plan:  When outside we recommend using a wide brim hat, sunglasses, long sleeve and pants, sunscreen with SPF 00+ with reapplication every 2 hours, or SPF specific clothing       What is a lentigo? A lentigo is a pigmented flat or slightly raised lesion with a clearly defined edge  Unlike an ephelis (freckle), it does not fade in the winter months   There are several kinds of " lentigo  The name lentigo originally referred to its appearance resembling a small lentil  The plural of lentigo is lentigines, although “lentigos” is also in common use  Who gets lentigines? Lentigines can affect males and females of all ages and races  Solar lentigines are especially prevalent in fair skinned adults  Lentigines associated with syndromes are present at birth or arise during childhood  What causes lentigines? Common forms of lentigo are due to exposure to ultraviolet radiation:  Sun damage including sunburn   Indoor tanning   Phototherapy, especially photochemotherapy (PUVA)    Ionizing radiation, eg radiation therapy, can also cause lentigines  Several familial syndromes associated with widespread lentigines originate from mutations in Aung-MAP kinase, mTOR signaling and PTEN pathways  What is the treatment for lentigines? Most lentigines are left alone  Attempts to lighten them may not be successful  The following approaches are used:  SPF 50+ broad-spectrum sunscreen   Hydroquinone bleaching cream   Alpha hydroxy acids   Vitamin C   Retinoids   Azelaic acid   Chemical peels  Individual lesions can be permanently removed using:  Cryotherapy   Intense pulsed light   Pigment lasers    How can lentigines be prevented? Lentigines associated with exposure ultraviolet radiation can be prevented by very careful sun protection  Clothing is more successful at preventing new lentigines than are sunscreens  What is the outlook for lentigines? Lentigines usually persist  They may increase in number with age and sun exposure  Some in sun-protected sites may fade and disappear        3  RINALDI ANGIOMAS  Assessment and Plan:  Based on a thorough discussion of this condition and the management approach to it (including a comprehensive discussion of the known risks, side effects and potential benefits of treatment), the patient (family) agrees to implement the following specific plan:  Monitor for "changes  Benign, reassured      Assessment and Plan:    Cherry angioma, also known as [employer] spots, are benign vascular skin lesions  A \"cherry angioma\" is a firm red, blue or purple papule, 0 1-1 cm in diameter  When thrombosed, they can appear black in colour until evaluated with a dermatoscope when the red or purple colour is more easily seen  Cherry angioma may develop on any part of the body but most often appear on the scalp, face, lips and trunk  An angioma is due to proliferating endothelial cells; these are the cells that line the inside of a blood vessel  Angiomas can arise in early life or later in life; the most common type of angioma is a cherry angioma  Cherry angiomas are very common in males and females of any age or race  They are more noticeable in white skin than in skin of colour  They markedly increase in number from about the age of 36  There may be a family history of similar lesions  Eruptive cherry angiomas have been rarely reported to be associated with internal malignancy  The cause of angiomas is unknown  Genetic analysis of cherry angiomas has shown that they frequently carry specific somatic missense mutations in the GNAQ and GNA11 (Q209H) genes, which are involved in other vascular and melanocytic proliferations  4  SEBORRHEIC KERATOSIS; NON-INFLAMED    Assessment and Plan:  Based on a thorough discussion of this condition and the management approach to it (including a comprehensive discussion of the known risks, side effects and potential benefits of treatment), the patient (family) agrees to implement the following specific plan:  Monitor for changes  Benign, reassured      Seborrheic Keratosis  A seborrheic keratosis is a harmless warty spot that appears during adult life as a common sign of skin aging  Seborrheic keratoses can arise on any area of skin, covered or uncovered, with the usual exception of the palms and soles   They do not arise from mucous " "membranes  Seborrheic keratoses can have highly variable appearance  Seborrheic keratoses are extremely common  It has been estimated that over 90% of adults over the age of 61 years have one or more of them  They occur in males and females of all races, typically beginning to erupt in the 35s or 45s  They are uncommon under the age of 21 years  The precise cause of seborrhoeic keratoses is not known  Seborrhoeic keratoses are considered degenerative in nature  As time goes by, seborrheic keratoses tend to become more numerous  Some people inherit a tendency to develop a very large number of them; some people may have hundreds of them  There is no easy way to remove multiple lesions on a single occasion  Unless a specific lesion is \"inflamed\" and is causing pain or stinging/burning or is bleeding, most insurance companies do not authorize treatment  5  XEROSIS (\"DRY SKIN\")  Assessment and Plan:  Based on a thorough discussion of this condition and the management approach to it (including a comprehensive discussion of the known risks, side effects and potential benefits of treatment), the patient (family) agrees to implement the following specific plan:  Use moisturizer like Eucerin,Cerave or Aveeno Cream 3 times a day for the dry skin            Dry skin refers to skin that feels dry to touch  Dry skin has a dull surface with a rough, scaly quality  The skin is less pliable and cracked  When dryness is severe, the skin may become inflamed and fissured  Although any body site can be dry, dry skin tends to affect the shins more than any other site  Dry skin is lacking moisture in the outer horny cell layer (stratum corneum) and this results in cracks in the skin surface  Dry skin is also called xerosis, xeroderma or asteatosis (lack of fat)  It can affect males and females of all ages  There is some racial variability in water and lipid content of the skin    Dry skin that starts in early " childhood may be one of about 20 types of ichthyosis (fish-scale skin)  There is often a family history of dry skin  Dry skin is commonly seen in people with atopic dermatitis  Nearly everyone > 60 years has dry skin  Dry skin that begins later may be seen in people with certain diseases and conditions  Postmenopausal women  Hypothyroidism  Chronic renal disease   Malnutrition and weight loss   Subclinical dermatitis   Treatment with certain drugs such as oral retinoids, diuretics and epidermal growth factor receptor inhibitors      What is the treatment for dry skin? The mainstay of treatment of dry skin and ichthyosis is moisturisers/emollients  They should be applied liberally and often enough to:  Reduce itch   Improve the barrier function   Prevent entry of irritants, bacteria   Reduce transepidermal water loss  How can dry skin be prevented? Eliminate aggravating factors:  Reduce the frequency of bathing  A humidifier in winter and air conditioner in summer   Compare having a short shower with a prolonged soak in a bath  Use lukewarm, not hot, water  Replace standard soap with a substitute such as a synthetic detergent cleanser, water-miscible emollient, bath oil, anti-pruritic tar oil, colloidal oatmeal etc    Apply an emollient liberally and often, particularly shortly after bathing, and when itchy  The drier the skin, the thicker this should be, especially on the hands  What is the outlook for dry skin? A tendency to dry skin may persist life-long, or it may improve once contributing factors are controlled      6  ACTINIC KERATOSIS  Assessment and Plan:  Based on a thorough discussion of this condition and the management approach to it (including a comprehensive discussion of the known risks, side effects and potential benefits of treatment), the patient (family) agrees to implement the following specific plan:  When outside we recommend using a wide brim hat, sunglasses, long sleeve and pants, sunscreen with SPF 00+ with reapplication every 2 hours, or SPF specific clothing   monitor   Follow up in 6 months for skin exam

## 2023-04-26 DIAGNOSIS — Z95.2 S/P TAVR (TRANSCATHETER AORTIC VALVE REPLACEMENT): Primary | ICD-10-CM

## 2023-04-26 DIAGNOSIS — I35.0 AORTIC STENOSIS, SEVERE: ICD-10-CM

## 2023-05-01 ENCOUNTER — APPOINTMENT (OUTPATIENT)
Dept: LAB | Facility: MEDICAL CENTER | Age: 78
End: 2023-05-01

## 2023-05-01 DIAGNOSIS — N18.4 CONTROLLED TYPE 2 DIABETES MELLITUS WITH STAGE 4 CHRONIC KIDNEY DISEASE, WITHOUT LONG-TERM CURRENT USE OF INSULIN (HCC): ICD-10-CM

## 2023-05-01 DIAGNOSIS — N25.81 SECONDARY HYPERPARATHYROIDISM OF RENAL ORIGIN (HCC): ICD-10-CM

## 2023-05-01 DIAGNOSIS — N18.4 CKD (CHRONIC KIDNEY DISEASE) STAGE 4, GFR 15-29 ML/MIN (HCC): ICD-10-CM

## 2023-05-01 DIAGNOSIS — R80.1 PERSISTENT PROTEINURIA, UNSPECIFIED: ICD-10-CM

## 2023-05-01 DIAGNOSIS — E11.22 CONTROLLED TYPE 2 DIABETES MELLITUS WITH STAGE 4 CHRONIC KIDNEY DISEASE, WITHOUT LONG-TERM CURRENT USE OF INSULIN (HCC): ICD-10-CM

## 2023-05-01 DIAGNOSIS — E79.0 HYPERURICEMIA: ICD-10-CM

## 2023-05-01 DIAGNOSIS — D50.9 IRON DEFICIENCY ANEMIA, UNSPECIFIED IRON DEFICIENCY ANEMIA TYPE: ICD-10-CM

## 2023-05-01 DIAGNOSIS — I12.9 BENIGN HYPERTENSION WITH CHRONIC KIDNEY DISEASE, STAGE IV (HCC): ICD-10-CM

## 2023-05-01 DIAGNOSIS — I50.32 CHRONIC DIASTOLIC CHF (CONGESTIVE HEART FAILURE) (HCC): ICD-10-CM

## 2023-05-01 DIAGNOSIS — N18.4 BENIGN HYPERTENSION WITH CHRONIC KIDNEY DISEASE, STAGE IV (HCC): ICD-10-CM

## 2023-05-01 LAB
25(OH)D3 SERPL-MCNC: 17 NG/ML (ref 30–100)
ANION GAP SERPL CALCULATED.3IONS-SCNC: 2 MMOL/L (ref 4–13)
BACTERIA UR QL AUTO: ABNORMAL /HPF
BILIRUB UR QL STRIP: NEGATIVE
BUN SERPL-MCNC: 39 MG/DL (ref 5–25)
CALCIUM SERPL-MCNC: 9.5 MG/DL (ref 8.3–10.1)
CHLORIDE SERPL-SCNC: 111 MMOL/L (ref 96–108)
CLARITY UR: CLEAR
CO2 SERPL-SCNC: 26 MMOL/L (ref 21–32)
COLOR UR: ABNORMAL
CREAT SERPL-MCNC: 2.35 MG/DL (ref 0.6–1.3)
CREAT UR-MCNC: 147 MG/DL
ERYTHROCYTE [DISTWIDTH] IN BLOOD BY AUTOMATED COUNT: 15.6 % (ref 11.6–15.1)
FERRITIN SERPL-MCNC: 33 NG/ML (ref 8–388)
GFR SERPL CREATININE-BSD FRML MDRD: 25 ML/MIN/1.73SQ M
GLUCOSE P FAST SERPL-MCNC: 142 MG/DL (ref 65–99)
GLUCOSE UR STRIP-MCNC: NEGATIVE MG/DL
HCT VFR BLD AUTO: 34.8 % (ref 36.5–49.3)
HGB BLD-MCNC: 10.9 G/DL (ref 12–17)
HGB UR QL STRIP.AUTO: NEGATIVE
HYALINE CASTS #/AREA URNS LPF: ABNORMAL /LPF
IRON SATN MFR SERPL: 17 % (ref 20–50)
IRON SERPL-MCNC: 49 UG/DL (ref 65–175)
KETONES UR STRIP-MCNC: NEGATIVE MG/DL
LEUKOCYTE ESTERASE UR QL STRIP: NEGATIVE
MAGNESIUM SERPL-MCNC: 2 MG/DL (ref 1.6–2.6)
MCH RBC QN AUTO: 30.4 PG (ref 26.8–34.3)
MCHC RBC AUTO-ENTMCNC: 31.3 G/DL (ref 31.4–37.4)
MCV RBC AUTO: 97 FL (ref 82–98)
MUCOUS THREADS UR QL AUTO: ABNORMAL
NITRITE UR QL STRIP: NEGATIVE
NON-SQ EPI CELLS URNS QL MICRO: ABNORMAL /HPF
PH UR STRIP.AUTO: 5.5 [PH]
PHOSPHATE SERPL-MCNC: 3.3 MG/DL (ref 2.3–4.1)
PLATELET # BLD AUTO: 182 THOUSANDS/UL (ref 149–390)
PMV BLD AUTO: 11 FL (ref 8.9–12.7)
POTASSIUM SERPL-SCNC: 4.4 MMOL/L (ref 3.5–5.3)
PROT UR STRIP-MCNC: ABNORMAL MG/DL
PROT UR-MCNC: 34 MG/DL
PROT/CREAT UR: 0.23 MG/G{CREAT} (ref 0–0.1)
PTH-INTACT SERPL-MCNC: 150.2 PG/ML (ref 18.4–80.1)
RBC # BLD AUTO: 3.59 MILLION/UL (ref 3.88–5.62)
RBC #/AREA URNS AUTO: ABNORMAL /HPF
SODIUM SERPL-SCNC: 139 MMOL/L (ref 135–147)
SP GR UR STRIP.AUTO: 1.01 (ref 1–1.03)
TIBC SERPL-MCNC: 288 UG/DL (ref 250–450)
URATE SERPL-MCNC: 4.5 MG/DL (ref 3.5–8.5)
UROBILINOGEN UR STRIP-ACNC: <2 MG/DL
WBC # BLD AUTO: 6.24 THOUSAND/UL (ref 4.31–10.16)
WBC #/AREA URNS AUTO: ABNORMAL /HPF

## 2023-05-02 ENCOUNTER — TELEPHONE (OUTPATIENT)
Dept: NEPHROLOGY | Facility: CLINIC | Age: 78
End: 2023-05-02

## 2023-05-02 DIAGNOSIS — N18.4 CKD (CHRONIC KIDNEY DISEASE) STAGE 4, GFR 15-29 ML/MIN (HCC): Primary | ICD-10-CM

## 2023-05-05 NOTE — TELEPHONE ENCOUNTER
Call pt  Needs OV or virtual visit to address nephrologist recommendation to change his diabetic medication

## 2023-05-09 ENCOUNTER — APPOINTMENT (OUTPATIENT)
Dept: LAB | Facility: MEDICAL CENTER | Age: 78
End: 2023-05-09

## 2023-05-09 DIAGNOSIS — N18.4 CKD (CHRONIC KIDNEY DISEASE) STAGE 4, GFR 15-29 ML/MIN (HCC): ICD-10-CM

## 2023-05-09 LAB
ANION GAP SERPL CALCULATED.3IONS-SCNC: 4 MMOL/L (ref 4–13)
BUN SERPL-MCNC: 44 MG/DL (ref 5–25)
CALCIUM SERPL-MCNC: 9.3 MG/DL (ref 8.3–10.1)
CHLORIDE SERPL-SCNC: 108 MMOL/L (ref 96–108)
CO2 SERPL-SCNC: 24 MMOL/L (ref 21–32)
CREAT SERPL-MCNC: 2.45 MG/DL (ref 0.6–1.3)
GFR SERPL CREATININE-BSD FRML MDRD: 23 ML/MIN/1.73SQ M
GLUCOSE P FAST SERPL-MCNC: 143 MG/DL (ref 65–99)
POTASSIUM SERPL-SCNC: 4.2 MMOL/L (ref 3.5–5.3)
SODIUM SERPL-SCNC: 136 MMOL/L (ref 135–147)

## 2023-05-10 ENCOUNTER — OFFICE VISIT (OUTPATIENT)
Dept: NEPHROLOGY | Facility: CLINIC | Age: 78
End: 2023-05-10

## 2023-05-10 ENCOUNTER — OFFICE VISIT (OUTPATIENT)
Dept: CARDIOLOGY CLINIC | Facility: MEDICAL CENTER | Age: 78
End: 2023-05-10

## 2023-05-10 VITALS
SYSTOLIC BLOOD PRESSURE: 152 MMHG | WEIGHT: 172 LBS | BODY MASS INDEX: 24.62 KG/M2 | DIASTOLIC BLOOD PRESSURE: 68 MMHG | OXYGEN SATURATION: 99 % | HEIGHT: 70 IN

## 2023-05-10 VITALS
WEIGHT: 170 LBS | SYSTOLIC BLOOD PRESSURE: 150 MMHG | HEART RATE: 60 BPM | BODY MASS INDEX: 24.34 KG/M2 | HEIGHT: 70 IN | DIASTOLIC BLOOD PRESSURE: 70 MMHG

## 2023-05-10 DIAGNOSIS — E55.9 VITAMIN D DEFICIENCY: ICD-10-CM

## 2023-05-10 DIAGNOSIS — I70.1 RENAL ARTERY STENOSIS, NATIVE, BILATERAL (HCC): Primary | Chronic | ICD-10-CM

## 2023-05-10 DIAGNOSIS — I70.213 ATHEROSCLEROSIS OF NATIVE ARTERIES OF EXTREMITIES WITH INTERMITTENT CLAUDICATION, BILATERAL LEGS (HCC): ICD-10-CM

## 2023-05-10 DIAGNOSIS — N18.4 CONTROLLED TYPE 2 DIABETES MELLITUS WITH STAGE 4 CHRONIC KIDNEY DISEASE, WITHOUT LONG-TERM CURRENT USE OF INSULIN (HCC): ICD-10-CM

## 2023-05-10 DIAGNOSIS — Z95.2 S/P TAVR (TRANSCATHETER AORTIC VALVE REPLACEMENT): ICD-10-CM

## 2023-05-10 DIAGNOSIS — I12.9 BENIGN HYPERTENSION WITH CHRONIC KIDNEY DISEASE, STAGE IV (HCC): ICD-10-CM

## 2023-05-10 DIAGNOSIS — Z95.1 S/P CABG (CORONARY ARTERY BYPASS GRAFT): Chronic | ICD-10-CM

## 2023-05-10 DIAGNOSIS — E11.22 CONTROLLED TYPE 2 DIABETES MELLITUS WITH STAGE 4 CHRONIC KIDNEY DISEASE, WITHOUT LONG-TERM CURRENT USE OF INSULIN (HCC): ICD-10-CM

## 2023-05-10 DIAGNOSIS — I50.32 CHRONIC DIASTOLIC CHF (CONGESTIVE HEART FAILURE) (HCC): ICD-10-CM

## 2023-05-10 DIAGNOSIS — I10 PRIMARY HYPERTENSION: ICD-10-CM

## 2023-05-10 DIAGNOSIS — R80.1 PERSISTENT PROTEINURIA, UNSPECIFIED: ICD-10-CM

## 2023-05-10 DIAGNOSIS — E78.2 MIXED HYPERLIPIDEMIA: ICD-10-CM

## 2023-05-10 DIAGNOSIS — I73.9 PVD (PERIPHERAL VASCULAR DISEASE) (HCC): ICD-10-CM

## 2023-05-10 DIAGNOSIS — N18.4 BENIGN HYPERTENSION WITH CHRONIC KIDNEY DISEASE, STAGE IV (HCC): ICD-10-CM

## 2023-05-10 DIAGNOSIS — I25.10 CORONARY ARTERY DISEASE INVOLVING NATIVE CORONARY ARTERY OF NATIVE HEART WITHOUT ANGINA PECTORIS: ICD-10-CM

## 2023-05-10 DIAGNOSIS — D50.9 IRON DEFICIENCY ANEMIA, UNSPECIFIED IRON DEFICIENCY ANEMIA TYPE: ICD-10-CM

## 2023-05-10 DIAGNOSIS — N25.81 SECONDARY HYPERPARATHYROIDISM OF RENAL ORIGIN (HCC): ICD-10-CM

## 2023-05-10 DIAGNOSIS — N18.4 CKD (CHRONIC KIDNEY DISEASE) STAGE 4, GFR 15-29 ML/MIN (HCC): Primary | ICD-10-CM

## 2023-05-10 RX ORDER — LISINOPRIL 20 MG/1
20 TABLET ORAL DAILY
Qty: 90 TABLET | Refills: 3 | Status: SHIPPED | OUTPATIENT
Start: 2023-05-10

## 2023-05-10 RX ORDER — CALCITRIOL 0.25 UG/1
0.25 CAPSULE, LIQUID FILLED ORAL 3 TIMES WEEKLY
Qty: 36 CAPSULE | Refills: 4 | Status: SHIPPED | OUTPATIENT
Start: 2023-05-10

## 2023-05-10 NOTE — PROGRESS NOTES
Name: Ivis Perez  : 1945      MRN: [id number]  Encounter Provider: Kelly Gimenez MD  Encounter Date: 2023   Encounter department: [address] [place] [address] Place     1  Secondary hyperparathyroidism of renal origin [facility])  Assessment & Plan:  Patient is stable  and will continue present plan of care and reassess at next routine visit  All questions about this problem from patient were answered today  2  S/P TAVR (transcatheter aortic valve replacement)  Assessment & Plan:  Patient is stable  and will continue present plan of care and reassess at next routine visit  All questions about this problem from patient were answered today  3  PVD (peripheral vascular disease) ([facility] [address] )  Assessment & Plan:  Patient is stable  and will continue present plan of care and reassess at next routine visit  All questions about this problem from patient were answered today  4  Primary hypertension  Assessment & Plan:  Patient is stable with current anti-hypertensive medicine and continue to follow a low sodium diet and take current medication  All questions about this condition were answered today  5  Mixed hyperlipidemia  Assessment & Plan:  Patient  is stable with current medication and we discussed a low fat low cholesterol diet  Weight loss also discussed for this will help lower cholesterol also  Recheck lipids in 6 months  6  Controlled type 2 diabetes mellitus with stage 4 chronic kidney disease, without long-term current use of insulin ([facility])  Assessment & Plan:  To d/c metfromin  Lab Results   Component Value Date    HGBA1C 7 6 (H) 02/10/2023       Orders:  -     glipiZIDE (GLUCOTROL) 5 mg tablet; Take 1 tablet (5 mg total) by mouth daily with breakfast        Falls Plan of Care: balance, strength, and gait training instructions were provided  Home safety education provided           Subjective     HPI  Review of Systems   Constitutional: Negative for activity change, appetite change, chills, fatigue, fever and unexpected weight change  HENT: Negative for congestion, ear pain, hearing loss, mouth sores, postnasal drip, sinus pressure, sinus pain, sneezing and sore throat  Respiratory: Negative for apnea, cough, shortness of breath and wheezing  Cardiovascular: Negative for chest pain, palpitations and leg swelling  Gastrointestinal: Negative for abdominal pain, constipation, diarrhea, nausea and vomiting  Endocrine: Negative for cold intolerance and heat intolerance  Genitourinary: Negative for dysuria, frequency and hematuria  Musculoskeletal: Negative for arthralgias, back pain, gait problem, joint swelling and neck pain  Skin: Negative for rash  Neurological: Negative for dizziness, weakness and numbness  Hematological: Does not bruise/bleed easily  Psychiatric/Behavioral: Negative for agitation, behavioral problems, confusion, hallucinations and sleep disturbance  The patient is not nervous/anxious  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 ([place] [id number] ) 09/10/2021   • 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 ([place] [address] ) 2/24/2023   • Basal cell carcinoma     right cheek   • CAD (coronary artery disease)    • Carotid stenosis, asymptomatic, bilateral    • Chronic kidney disease    • Colon polyp    • Diabetes ([facility] )     type 2, non-insulin dependent   • Diabetes mellitus (HCC)    • GERD (gastroesophageal reflux disease)    • History of nephrolithiasis    • Hyperlipidemia    • Hypertension    • Left foot pain 11/30/2022   • PAD (peripheral artery disease) ([facility])    • Severe aortic stenosis    • Squamous cell skin cancer 11/22/2022    left superior helix     Past Surgical History:   Procedure Laterality Date   • APPENDECTOMY     • CARDIAC CATHETERIZATION N/A 05/05/2022    Procedure: CARDIAC RHC/LHC;   Surgeon: Sharla Goldmann, DO;  [facility]; Service: Cardiology   • CARDIAC CATHETERIZATION N/A 05/05/2022    Procedure: Cardiac Coronary Angiogram;  Surgeon: Sharla Goldmann, DO;  [facility]; Service: Cardiology   • CARDIAC CATHETERIZATION N/A 05/24/2022    Procedure: Cardiac pci;  Surgeon: Germain Sapp MD;  Location: [facility];   Service: Cardiology   • CARDIAC CATHETERIZATION N/A 06/14/2022    Procedure: CARDIAC TAVR;  Surgeon: Isis Urena MD;  Location: [facility];  Service: Cardiology   • COLECTOMY     • COLONOSCOPY  2013   • CORONARY ARTERY BYPASS GRAFT  2013    X 2   • FEMORAL ARTERY - POPLITEAL ARTERY BYPASS GRAFT     • HEMORRHOID SURGERY     • IR AORTAGRAM WITH RUN-OFF  11/19/2018   • IR AORTAGRAM WITH RUN-OFF  3/2/2023   • IR LOWER EXTREMITY ANGIOGRAM  3/23/2023   • MOHS SURGERY Left 01/11/2023    SCC left superior helix-Dr Guy   • AK SLCTV CATHJ 3RD+ ORD SLCTV ABDL PEL/LXTR [facility] Left 08/12/2016    Procedure: LEFT FEMORAL ARTERIOGRAM; BALLOON ANGIOPLASTY; SFA  AND FEMORAL AT VEIN GRAFT;  Surgeon: Marleen Oliver MD;  Location: [facility] MA[facility];  Service: Vascular   • AK TEAEC W/WO PATCH GRAFT COMMON FEMORAL Right 3/23/2023    Procedure: Common femoral endarterectomy&antegrade intervention of SFA, popliteal artery w/ shockwave;  Surgeon: Edwige Willis MD;  Location: [facility];  Service: Vascular   • AK TRANSCATHETER TRANSAPICAL REPLACEMT AORTIC VALVE N/A 06/14/2022    Procedure: REPLACEMENT AORTIC VALVE TRANSCATHETER (TAVR) TRANSAPICAL 29MM IRVIN KYLER S3 ULTRA VALVE(ACCESS ON LEFT) ELANA;  Surgeon: Mike Joseph DO;  Location: [facility];  Service: Cardiac Surgery   • SKIN CANCER EXCISION     • TONSILLECTOMY AND ADENOIDECTOMY       Family History   Problem Relation Age of Onset   • Heart attack Father    • Other Sister         bypass and vlave replacement   • Stroke Paternal Uncle    • Arrhythmia Neg Hx    • Asthma Neg Hx    • Clotting disorder Neg Hx    • Fainting Neg Hx    • Anuerysm Neg Hx    • Hypertension Neg Hx         unsure    • Hyperlipidemia Neg Hx    • Heart failure Neg Hx      Social History     Socioeconomic History   • Marital status:      Spouse name: None   • Number of children: None   • Years of education: None   • Highest education level: None   Occupational History   • None   Tobacco Use   • Smoking status: Former     Packs/day: 1 00     Years: 50 00     Pack years: 50 00     Types: Cigarettes     Quit date: 3/3/2023     Years since quittin 1   • Smokeless tobacco: Never   Vaping Use   • Vaping Use: Never used   Substance and Sexual Activity   • Alcohol use: Not Currently     Comment: rare   • Drug use: No   • Sexual activity: None   Other Topics Concern   • None   Social History Narrative    · Most recent tobacco use screenin2018      · Do you currently or have you served in the [employer]: Yes      · If Yes, What branch of service:   [employer] jobs      · Occupation:   Dentists      · Exercise level:   Occasional        · Caffeine intake:   Heavy      · Marital status:         · Diet:   Regular  low fat     · Seat belts used routinely:   Yes      · Smoke alarm in home: Yes      · Advance directive: Yes      · General stress level:   Low      Social Determinants of Health     Financial Resource Strain: Not on file   Food Insecurity: No Food Insecurity   • Worried About Running Out of Food in the Last Year: Never true   • Ran Out of Food in the Last Year: Never true   Transportation Needs: No Transportation Needs   • Lack of Transportation (Medical): No   • Lack of Transportation (Non-Medical):  No   Physical Activity: Not on file   Stress: Not on file   Social Connections: Not on file   Intimate Partner Violence: Not on file   Housing Stability: Low Risk    • Unable to Pay for Housing in the Last Year: No   • Number of Places Lived in the Last Year: 1   • Unstable Housing in the Last Year: No Current Outpatient Medications on File Prior to Visit   Medication Sig   • acetaminophen (TYLENOL) 325 mg tablet Take 3 tablets (975 mg total) by mouth every 8 (eight) hours   • allopurinol (ZYLOPRIM) 300 mg tablet Take 1 tablet (300 mg total) by mouth daily   • amLODIPine (NORVASC) 10 mg tablet TAKE 1 TABLET DAILY   • AMYLASE-LIPASE-PROTEASE PO Take by mouth 2 (two) times a day before meals   • aspirin (ECOTRIN LOW STRENGTH) 81 mg EC tablet Take 81 mg by mouth daily   • atorvastatin (LIPITOR) 80 mg tablet TAKE 1 TABLET DAILY AT     BEDTIME   • calcitriol (ROCALTROL) 0 25 mcg capsule Take 1 capsule (0 25 mcg total) by mouth 3 (three) times a week   • Cholecalciferol (Vitamin D3 Ultra Potency) 1 25 MG (16563 UT) TABS Take 1 tablet (50,000 Units total) by mouth once a week   • clopidogrel (PLAVIX) 75 mg tablet Take 1 tablet (75 mg total) by mouth in the morning     • lisinopril (ZESTRIL) 20 mg tablet Take 1 tablet (20 mg total) by mouth daily   • Magnesium Oxide (MAG-200 PO) Take by mouth once a week   • metoprolol succinate (TOPROL-XL) 25 mg 24 hr tablet Take 0 5 tablets (12 5 mg total) by mouth daily   • pancrelipase, Lip-Prot-Amyl, (CREON) 24,000 units Take 24,000 units of lipase by mouth 3 (three) times a day with meals   • pantoprazole (PROTONIX) 40 mg tablet TAKE 1 TABLET DAILY   • potassium chloride (K-DUR,KLOR-CON) 10 mEq tablet Take 1 tablet (10 mEq total) by mouth daily   • torsemide (DEMADEX) 20 mg tablet Take 1 tablet (20 mg total) by mouth 2 (two) times a day (Patient taking differently: Take 10 mg by mouth daily)   • Ascorbic Acid (vitamin C) 100 MG tablet Take 250 mg by mouth daily (Patient not taking: Reported on 5/11/2023)   • Cyanocobalamin (VITAMIN B12 PO) Take by mouth once a week   (Patient not taking: Reported on 5/10/2023)   • docusate sodium (COLACE) 100 mg capsule Take 1 capsule (100 mg total) by mouth 2 (two) times a day (Patient not taking: Reported on 5/10/2023)     No Known "Allergies  Immunization History   Administered Date(s) Administered   • COVID-19 PFIZER VACCINE 0 3 ML IM 03/03/2021, 03/24/2021, 09/30/2021   • COVID-19 Pfizer Vac BIVALENT Stuart-sucrose 12 Yr+ IM (BOOSTER ONLY) 09/29/2022   • H1N1 Inj 11/15/2020   • H1N1, All Formulations 11/15/2020   • INFLUENZA 10/04/2011, 10/19/2012, 10/16/2014, 10/13/2016, 10/06/2018, 11/11/2021, 10/19/2022   • Influenza Split High Dose Preservative Free IM 10/06/2018, 11/01/2019   • Pneumococcal Conjugate 13-Valent 12/04/2015   • Pneumococcal Polysaccharide PPV23 06/11/2019   • Tdap 10/15/2021       Objective     /60 (BP Location: Left arm, Patient Position: Sitting, Cuff Size: Standard)   Pulse 57   Temp 98 °F (36 7 °C) (Temporal)   Resp 18   Ht 5' 10\" (1 778 m)   Wt 77 1 kg (170 lb)   SpO2 98%   BMI 24 39 kg/m²     Physical Exam  Vitals and nursing note reviewed  Constitutional:       Appearance: He is well-developed  HENT:      Head: Normocephalic and atraumatic  Nose: Nose normal    Eyes:      General: No scleral icterus  Conjunctiva/sclera: Conjunctivae normal       Pupils: Pupils are equal, round, and reactive to light  Neck:      Thyroid: No thyromegaly  Cardiovascular:      Rate and Rhythm: Normal rate and regular rhythm  Heart sounds: Normal heart sounds  Pulmonary:      Effort: Pulmonary effort is normal  No respiratory distress  Breath sounds: Normal breath sounds  No wheezing  Abdominal:      General: Bowel sounds are normal       Palpations: Abdomen is soft  Tenderness: There is no abdominal tenderness  There is no guarding or rebound  Musculoskeletal:         General: Normal range of motion  Cervical back: Normal range of motion and neck supple  Skin:     General: Skin is warm and dry  Findings: No rash  Neurological:      Mental Status: He is alert and oriented to person, place, and time     Psychiatric:         Mood and Affect: Mood normal          Behavior: " Behavior normal          Thought Content:  Thought content normal        Rasheeda Jefferson MD

## 2023-05-10 NOTE — PATIENT INSTRUCTIONS
-Renal Function is stable   -You have Chronic Kidney Disease Stage 4  -Avoid NSAIDs like Ibuprofen/Motrin, Naproxen/Aleve, Celebrex, meloxicam/Mobic, Diclofenac and other NSAIDs   -Okay to take Acetaminophen/Tylenol if you do not have any liver problems  -Avoid IV contrast used for CT scan and cardiac catheterization     -If plan for any study with IV contrast, please let me know so we could hydrate with fluids before and after IV contrast  -Dosage  of certain medications may need to be adjusted depending on Kidney function  Blood pressure is elevated - to take medication at home    -Recommend 2 g sodium diet  -Recommend daily exercise and weight loss  -Discussed home monitoring of BP and maintaining a log of blood pressure   -Recommend goal BP less than 130/80  Please inform me if SBP below 110 or above 140's persistently  BMP in two weeks and two months     Follow up: 4 months with repeat Lab work within a week of the scheduled office visit  Will discuss the results of the previsit Labs during the office visit

## 2023-05-10 NOTE — PROGRESS NOTES
Cardiology   Ivis Perez  [de-identified] y o  male MRN: [id number]        Impression:  1  Coronary artery disease s/p CABG - s/p recent PCI of RCA  2  Hypertension - not adequate control    3  Severe bilateral carotid disease - followed by vascular surgery  4  Dyslipidemia - doing well  5  Peripheral arterial disease - s/p LLE revascularization and RLE revascularization 3/23  6  Aortic stenosis - s/p TAVR 6/22  7  Bradycardia - on low-dose b-blockers    8  CKD - GFR 24      Recommendations:  1  Increase lisinopril to 20mg daily  2  Continue remainder of medication  3  Follow up in 6 months      HPI: Ivis Perez  is a [de-identified]y o  year old male with coronary artery disease s/p CABG with PCI of RCA 5/22, AS s o TAVR 6/22, HTN, Dyslipidemia and LLE revascularization who returns for follow up  No chest pain, shortness of breath, or palpitations  Stress test 8/22 - no ischemia  Echo 7/20 - normal cardiac function with normal TAVR  Does have headache  Underwent RLE revascularization 3/23  Review of Systems   Constitutional: Negative  HENT: Negative  Eyes: Negative  Respiratory: Negative for chest tightness and shortness of breath  Cardiovascular: Negative for chest pain, palpitations and leg swelling  Gastrointestinal: Negative  Endocrine: Negative  Genitourinary: Negative  Musculoskeletal: Negative  Skin: Negative  Allergic/Immunologic: Negative  Neurological: Positive for headaches  Hematological: Negative  Psychiatric/Behavioral: Negative  All other systems reviewed and are negative          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 ([facility] ) 09/10/2021   • 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 ([facility] [address] ) 2/24/2023   • Basal cell carcinoma     right cheek   • CAD (coronary artery disease)    • Carotid stenosis, asymptomatic, bilateral    • Chronic kidney disease    • Colon polyp    • Diabetes ([facility] [address] )     type 2, non-insulin dependent   • Diabetes mellitus ([facility] [address] )    • GERD (gastroesophageal reflux disease)    • History of nephrolithiasis    • Hyperlipidemia    • Hypertension    • Left foot pain 11/30/2022   • PAD (peripheral artery disease) ([facility])    • Severe aortic stenosis    • Squamous cell skin cancer 11/22/2022    left superior helix     Past Surgical History:   Procedure Laterality Date   • APPENDECTOMY     • CARDIAC CATHETERIZATION N/A 05/05/2022    Procedure: CARDIAC RHC/LHC; Surgeon: Michele Pérez DO;  [facility]: [facility]; Service: Cardiology   • CARDIAC CATHETERIZATION N/A 05/05/2022    Procedure: Cardiac Coronary Angiogram;  Surgeon: Michele Pérez DO;  [facility]; Service: Cardiology   • CARDIAC CATHETERIZATION N/A 05/24/2022    Procedure: Cardiac pci;  Surgeon: Rosendo Kyle MD;  Location: [facility];   Service: Cardiology   • CARDIAC CATHETERIZATION N/A 06/14/2022    Procedure: CARDIAC TAVR;  Surgeon: Anayeli Ospina MD;  Location: [facility];  Service: Cardiology   • COLECTOMY     • COLONOSCOPY  2013   • CORONARY ARTERY BYPASS GRAFT  2013    X 2   • FEMORAL ARTERY - POPLITEAL ARTERY BYPASS GRAFT     • HEMORRHOID SURGERY     • IR AORTAGRAM WITH RUN-OFF  11/19/2018   • IR AORTAGRAM WITH RUN-OFF  3/2/2023   • IR LOWER EXTREMITY ANGIOGRAM  3/23/2023   • MOHS SURGERY Left 01/11/2023    SCC left superior helix-Dr Tovar   • MI SLCTV CATHJ 3RD+ ORD SLCTV ABDL PEL/LXTR [facility] Left 08/12/2016    Procedure: LEFT FEMORAL ARTERIOGRAM; BALLOON ANGIOPLASTY; SFA  AND FEMORAL AT VEIN GRAFT;  Surgeon: Delia Peguero MD;  Location: [facility] MA[facility];  Service: Vascular   • MI TEAEC W/WO PATCH GRAFT COMMON FEMORAL Right 3/23/2023    Procedure: Common femoral endarterectomy&antegrade intervention of SFA, popliteal artery w/ shockwave;  Surgeon: Mani Pichardo MD;  Location: [facility];  Service: Vascular   • NV TRANSCATHETER TRANSAPICAL REPLACEMT AORTIC VALVE N/A 2022    Procedure: REPLACEMENT AORTIC VALVE TRANSCATHETER (TAVR) TRANSAPICAL 29MM IRVIN KYLER S3 ULTRA VALVE(ACCESS ON LEFT) ELANA;  Surgeon: Dario Vasquez DO;  Location: [facility];  Service: Cardiac Surgery   • SKIN CANCER EXCISION     • TONSILLECTOMY AND ADENOIDECTOMY       Social History     Substance and Sexual Activity   Alcohol Use Not Currently    Comment: rare     Social History     Substance and Sexual Activity   Drug Use No     Social History     Tobacco Use   Smoking Status Former   • Packs/day: 1 00   • Years: 50 00   • Pack years: 50 00   • Types: Cigarettes   • Quit date: 3/3/2023   • Years since quittin 1   Smokeless Tobacco Never     Family History   Problem Relation Age of Onset   • Heart attack Father    • Other Sister         bypass and vlave replacement   • Stroke Paternal Uncle    • Arrhythmia Neg Hx    • Asthma Neg Hx    • Clotting disorder Neg Hx    • Fainting Neg Hx    • Anuerysm Neg Hx    • Hypertension Neg Hx         unsure    • Hyperlipidemia Neg Hx    • Heart failure Neg Hx        Allergies:  No Known Allergies    Medications:     Current Outpatient Medications:   •  acetaminophen (TYLENOL) 325 mg tablet, Take 3 tablets (975 mg total) by mouth every 8 (eight) hours, Disp: , Rfl: 0  •  allopurinol (ZYLOPRIM) 300 mg tablet, Take 1 tablet (300 mg total) by mouth daily, Disp: 90 tablet, Rfl: 3  •  amLODIPine (NORVASC) 10 mg tablet, TAKE 1 TABLET DAILY, Disp: 90 tablet, Rfl: 3  •  AMYLASE-LIPASE-PROTEASE PO, Take by mouth 2 (two) times a day before meals, Disp: , Rfl:   •  aspirin (ECOTRIN LOW STRENGTH) 81 mg EC tablet, Take 81 mg by mouth daily, Disp: , Rfl:   •  atorvastatin (LIPITOR) 80 mg tablet, TAKE 1 TABLET DAILY AT     BEDTIME, Disp: 90 tablet, Rfl: 3  •  calcitriol (ROCALTROL) 0 25 mcg capsule, Take 1 capsule (0 25 mcg total) by mouth 3 (three) times a week, Disp: 36 capsule, Rfl: 4  •  Cholecalciferol (Vitamin D3 Ultra Potency) 1 25 MG (55350 UT) TABS, Take 1 tablet (50,000 Units total) by mouth once a week, Disp: 16 tablet, Rfl: 1  •  clopidogrel (PLAVIX) 75 mg tablet, Take 1 tablet (75 mg total) by mouth in the morning , Disp: 90 tablet, Rfl: 3  •  lisinopril (ZESTRIL) 10 mg tablet, Take 1 tablet (10 mg total) by mouth daily, Disp: 90 tablet, Rfl: 3  •  metoprolol succinate (TOPROL-XL) 25 mg 24 hr tablet, Take 0 5 tablets (12 5 mg total) by mouth daily, Disp: 45 tablet, Rfl: 1  •  pancrelipase, Lip-Prot-Amyl, (CREON) 24,000 units, Take 24,000 units of lipase by mouth 3 (three) times a day with meals, Disp: 300 capsule, Rfl: 2  •  pantoprazole (PROTONIX) 40 mg tablet, TAKE 1 TABLET DAILY, Disp: 90 tablet, Rfl: 1  •  potassium chloride (K-DUR,KLOR-CON) 10 mEq tablet, Take 1 tablet (10 mEq total) by mouth daily, Disp: 90 tablet, Rfl: 3  •  torsemide (DEMADEX) 20 mg tablet, Take 1 tablet (20 mg total) by mouth 2 (two) times a day (Patient taking differently: Take 10 mg by mouth daily), Disp: 180 tablet, Rfl: 3  •  Ascorbic Acid (vitamin C) 100 MG tablet, Take 250 mg by mouth daily (Patient not taking: Reported on 5/10/2023), Disp: , Rfl:   •  Cyanocobalamin (VITAMIN B12 PO), Take by mouth once a week   (Patient not taking: Reported on 5/10/2023), Disp: , Rfl:   •  docusate sodium (COLACE) 100 mg capsule, Take 1 capsule (100 mg total) by mouth 2 (two) times a day (Patient not taking: Reported on 5/10/2023), Disp: , Rfl: 0  •  Magnesium Oxide (MAG-200 PO), Take by mouth once a week, Disp: , Rfl:       Wt Readings from Last 3 Encounters:   05/10/23 78 kg (172 lb)   05/10/23 77 1 kg (170 lb)   04/25/23 79 4 kg (175 lb 0 7 oz)     Temp Readings from Last 3 Encounters:   04/15/23 98 2 °F (36 8 °C) (Temporal)   04/07/23 97 7 °F (36 5 °C) (Temporal)   04/05/23 (!) 97 3 °F (36 3 °C)     BP Readings from Last 3 Encounters:   05/10/23 152/68   05/10/23 150/70   04/15/23 147/52     Pulse Readings from Last 3 Encounters:   05/10/23 60 04/15/23 69   23 58         Physical Exam  HENT:      Head: Atraumatic  Mouth/Throat:      Mouth: Mucous membranes are moist    Eyes:      Extraocular Movements: Extraocular movements intact  Cardiovascular:      Rate and Rhythm: Normal rate and regular rhythm  Heart sounds: Normal heart sounds  Pulmonary:      Effort: Pulmonary effort is normal       Breath sounds: Normal breath sounds  Abdominal:      General: Abdomen is flat  Musculoskeletal:         General: Normal range of motion  Cervical back: Normal range of motion  Skin:     General: Skin is warm  Neurological:      General: No focal deficit present  Mental Status: He is alert and oriented to person, place, and time     Psychiatric:         Mood and Affect: Mood normal          Behavior: Behavior normal            Laboratory Studies:  CMP:  Lab Results   Component Value Date     2015    K 4 2 2023     2023    CO2 24 2023    ANIONGAP 7 2015    BUN 44 (H) 2023    CREATININE 2 45 (H) 2023    GLUCOSE 163 (H) 2023    AST 11 02/10/2023    ALT 14 02/10/2023    EGFR 23 2023       Lipid Profile:   No results found for: CHOL  Lab Results   Component Value Date    HDL 52 2022     Lab Results   Component Value Date    LDLCALC 33 2022     Lab Results   Component Value Date    TRIG 50 2022       Cardiac testing:   EKG reviewed personally:   Results for orders placed during the hospital encounter of 21    Echo complete with contrast if indicated    Narrative  [facility], [address] [facility]  [phone]    Transthoracic Echocardiogram  2D, M-mode, Doppler, and Color Doppler    Study date:  06-May-2021    Patient: Toma Clark  MR number: [de-identified]  Account number: [de-identified]  : 1945  Age: 64 years  Gender: Male  Status: Outpatient  Location: [place] [address]   Height: 70 in  Weight: 181 lb  BP: 150/ 58 mmHg    Indications: Assess the aortic valve  Diagnoses: I35 0 - Nonrheumatic aortic (valve) stenosis    Sonographer:  Richard Frank RDCS  Primary Physician:  Osman Atwood MD  Referring Physician:  Andrade Wilson MD  Group:  [facility]  Interpreting Physician:  Chaz Bray MD    SUMMARY    LEFT VENTRICLE:  Systolic function was normal  Ejection fraction was estimated to be 60 %  There were no regional wall motion abnormalities  Wall thickness was mildly increased  Doppler parameters were consistent with abnormal left ventricular relaxation (grade 1 diastolic dysfunction)  LEFT ATRIUM:  The atrium was mildly dilated  MITRAL VALVE:  There was mild regurgitation  AORTIC VALVE:  The valve was probably trileaflet  Leaflets exhibited moderately increased thickness and moderate calcification  There was severe stenosis  There was trace regurgitation  Valve peak gradient was 63 mmHg  Valve mean gradient was 32 mmHg  Aortic valve area was 1 cmï¾² by the continuity equation  Estimated aortic valve area (by VTI) was 0 91 cmï¾²  TRICUSPID VALVE:  There was trace regurgitation  HISTORY: PRIOR HISTORY: CAD, bradycardia, DM, dyslipidemia, PAD, CABG,    PROCEDURE: The study was performed in the [address]  This was a routine study  The transthoracic approach was used  The study included complete 2D imaging, M-mode, complete spectral Doppler, and color Doppler  The heart rate was  66 bpm, at the start of the study  Images were obtained from the parasternal, apical, subcostal, and suprasternal notch acoustic windows  Image quality was adequate  LEFT VENTRICLE: Size was normal  Systolic function was normal  Ejection fraction was estimated to be 60 %  There were no regional wall motion abnormalities  Wall thickness was mildly increased   DOPPLER: Doppler parameters were consistent  with abnormal left ventricular relaxation (grade 1 diastolic dysfunction)  RIGHT VENTRICLE: The size was normal  Systolic function was normal  Wall thickness was normal     LEFT ATRIUM: The atrium was mildly dilated  RIGHT ATRIUM: Size was normal     MITRAL VALVE: Valve structure was normal  There was normal leaflet separation  DOPPLER: The transmitral velocity was within the normal range  There was no evidence for stenosis  There was mild regurgitation  AORTIC VALVE: The valve was probably trileaflet  Leaflets exhibited moderately increased thickness and moderate calcification  DOPPLER: There was severe stenosis  There was trace regurgitation  TRICUSPID VALVE: The valve structure was normal  There was normal leaflet separation  DOPPLER: The transtricuspid velocity was within the normal range  There was no evidence for stenosis  There was trace regurgitation  PULMONIC VALVE: Leaflets exhibited normal thickness, no calcification, and normal cuspal separation  DOPPLER: The transpulmonic velocity was within the normal range  There was no significant regurgitation  PERICARDIUM: There was no pericardial effusion  The pericardium was normal in appearance  AORTA: The root exhibited normal size  SYSTEMIC VEINS: IVC: The inferior vena cava was normal in size      MEASUREMENT TABLES    2D MEASUREMENTS  LVOT   (Reference normals)  Diam   23 mm   (--)    DOPPLER MEASUREMENTS  LVOT   (Reference normals)  VTI   24 cm   (--)  R-R interval   1017 ms   (--)  HR   59 bpm   (--)  Stroke vol   99 71 ml   (--)  Cardiac output   5 88 L/min   (--)  Cardiac index   2 94 L/min/mï¾²   (--)  Aortic valve   (Reference normals)  VTI   108 cm   (--)  Peak gradient   63 mmHg   (--)  Mean gradient   32 mmHg   (--)  Valve area, cont   1 cmï¾²   (--)  Obstr index, VTI   0 22    (--)  Valve area, VTI   0 91 cmï¾²   (--)  Area index, VTI   0 46 cmï¾²/mï¾²   (--)    SYSTEM MEASUREMENT TABLES    2D  %FS: 23 52 %  Ao Diam: 3 06 cm  EDV(Teich): 137 67 ml  EF(Teich): 46 6 %  ESV(Teich): 73 51 ml  IVSd: 1 26 cm  LA Area: 24 18 cm2  LA Diam: 4 19 cm  LVEDV MOD A4C: 198 32 ml  LVEF MOD A4C: 54 08 %  LVESV MOD A4C: 91 07 ml  LVIDd: 5 34 cm  LVIDs: 4 08 cm  LVLd A4C: 10 15 cm  LVLs A4C: 8 98 cm  LVOT Diam: 2 43 cm  LVPWd: 1 18 cm  RA Area: 14 06 cm2  RVIDd: 3 75 cm  SV MOD A4C: 107 24 ml  SV(Teich): 64 16 ml    CW  AV Env  Ti: 414 84 ms  AV MaxP 54 mmHg  AV VTI: 103 91 cm  AV Vmax: 3 82 m/s  AV Vmean: 2 5 m/s  AV meanP 08 mmHg    MM  TAPSE: 2 13 cm    PW  LUIS (VTI): 1 06 cm2  LUIS Vmax: 1 04 cm2  E' Sept: 0 04 m/s  E/E' Sept: 22 83  LVOT Env  Ti: 482 08 ms  LVOT VTI: 23 7 cm  LVOT Vmax: 0 86 m/s  LVOT Vmean: 0 5 m/s  LVOT maxP 94 mmHg  LVOT meanP 25 mmHg  LVSV Dopp: 110 25 ml  MV A Pop: 1 23 m/s  MV Dec [place]: 5 05 m/s2  MV DecT: 180 67 ms  MV E Pop: 0 91 m/s  MV E/A Ratio: 0 74  MV PHT: 52 39 ms  MVA By PHT: 4 2 cm2    [facility] Accredited Echocardiography Laboratory    Prepared and electronically signed by    Guero Sorensen MD  Signed 06-May-2021 16:40:31    No results found for this or any previous visit  No results found for this or any previous visit  No results found for this or any previous visit

## 2023-05-10 NOTE — PROGRESS NOTES
NEPHROLOGY OUTPATIENT PROGRESS NOTE   Margaux Beckett  [de-identified] y o  male MRN: [id number]  DATE: 5/10/2023  Reason for visit:   Chief Complaint   Patient presents with   • Follow-up     CKD (chronic kidney disease) stage 4, GFR 15-29 ml/min        ASSESSMENT and PLAN:  Chronic Kidney Disease Stage 4  -Baseline Creatinine: Recently since 10/2022 has been around 2 0-2 2 and renal function was at baseline in March 2023 but worsened on last blood work from May to creatinine 2 3-2 4  Most likely has CKD progression versus possibility of cholesterol embolization as patient underwent angiogram in March 2023  -Etiology: 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 and age-related nephron loss plus episodes of acute kidney injury  -Plan:   • Renal function worsened to creatinine 2 45 on last blood work, possibility of volume depletion versus possibility of cholesterol emboli from angiogram done in March 2023  Repeat BMP in 2 week, stressed on oral hydration  Check C3-C4  Follow up in 4 months with labs   Bmp in two months   •  Avoid Nephrotoxins like NSAIDs and IV contrast    • If renal function does not improve may consider decreasing torsemide to 10 every other day  • CKD Education: not interested   He mentions has been watching [employer] videos  Still not interested   • Recommend avoiding metformin as GFR less than 30  • Discussed about Gabino Bray   • Has record of living will in the system  • Follow up in 4 months        Primary Hypertension with chronic kidney disease stage 4:   -Current medication:  Amlodipine 10 mg daily, lisinopril 10 mg, Metoprolol 12 5 mg bid, Torsemide 10 mg   -Current blood pressure: BP elevated ,could be due to not taking medication today  -Plan:    ? Monitor BP at home , continue same medications     ? IF BP elevated, would increase lisinopril   -Recommend 2 g sodium diet     -Recommend daily exercise and weight loss  -Discussed home monitoring of BP and maintaining a log of blood pressure   -Recommend goal BP less than 130/80       Chronic diastolic CHF/severe aortic stenosis status post TAVR  -Echo from 6/2022- was EF 60 %  Diastolic dysfunction  - taking torsemide 10 mg daily   Continue same medication as clinically euvolemic  -Recommend fluid restriction 1 8 L/min  -says weight same -continue weight monitoring, recommend taking extra torsemide in future if any weight gain more than 3 lb in a day or 5 lb in a week and to follow low-sodium diet     Proteinuria, persistent  -UPC ratio stable at [facility] ratio 0 23   -likely due to hypertensive nephrosclerosis  -continue lisinopril       Hypomagnesemia  -last Mag level wnl- taking multivitamin  -likely due to use of PPI     Hyperphosphatemia- level improved to 3 3  -recommend low phosphorus diet   -list provided previously      Secondary hyperparathyroidism of renal origin/ Vitamin D deficiency  -    Continue calcitriol to 0 25 mcg 3 times/week - monitor PTH   -started on vitamin D 49556 units weekly      Anemia due to CKD stage 4 and due to iron deficiency  -hb 10 9, iron sat 17 %  -recommend oral iron tablets 325 mg ferrous sulfate daily   -not sure of the dose but can increase to twice daily    -stressed on hematology follow up      B/L nephrolithiasis/left renal cyst  -currently asymptomatic and patient not aware  -reviewed CT abdomen report from April 2022, finding of nonobstructing bilateral nephrolithiasis   Largest 8 mm inferior pole of left kidney    -Pt asymptomatic   - no monitoring needed for renal cyst as it was a simple parapelvic cyst  Not mentioned about  Renal cyst       Hyperlipidemia  -on Lipitor  - recommend goal LDL less than 100  -last lipid panel was at goal , LDL 40      DM-2 controlled, with chronic kidney disease stage 4  -now off metformin- to hold metformin as GFR less than 30-to discuss with PCP regarding other has not  -last A1c was 7 7, says was eating ice-cream, stressed on diet control   -continue management per PCP     Smoking/tobacco use, quit smoking already      Hyperuricemia  -on allopurinol - 300 mg daily   -uric acid improved to 4 5   -low purine diet             Patient Instructions   -Renal Function is stable   -You have Chronic Kidney Disease Stage 4  -Avoid NSAIDs like Ibuprofen/Motrin, Naproxen/Aleve, Celebrex, meloxicam/Mobic, Diclofenac and other NSAIDs   -Okay to take Acetaminophen/Tylenol if you do not have any liver problems  -Avoid IV contrast used for CT scan and cardiac catheterization     -If plan for any study with IV contrast, please let me know so we could hydrate with fluids before and after IV contrast  -Dosage  of certain medications may need to be adjusted depending on Kidney function  Blood pressure is elevated - to take medication at home    -Recommend 2 g sodium diet  -Recommend daily exercise and weight loss  -Discussed home monitoring of BP and maintaining a log of blood pressure   -Recommend goal BP less than 130/80  Please inform me if SBP below 110 or above 140's persistently  BMP in two weeks and two months     Follow up: 4 months with repeat Lab work within a week of the scheduled office visit  Will discuss the results of the previsit Labs during the office visit  Diagnoses and all orders for this visit:    CKD (chronic kidney disease) stage 4, GFR 15-29 ml/min ([facility])  -     Basic metabolic panel; Future  -     Basic metabolic panel; Future  -     Protein / creatinine ratio, urine; Future  -     PTH, intact; Future  -     Urinalysis with microscopic; Future  -     Phosphorus; Future  -     Magnesium; Future  -     CBC; Future  -     Basic metabolic panel; Future    Benign hypertension with chronic kidney disease, stage IV (HCC)  -     Basic metabolic panel; Future    Secondary hyperparathyroidism of renal origin ([facility] [address] )  -     calcitriol (ROCALTROL) 0 25 mcg capsule; Take 1 capsule (0 25 mcg total) by mouth 3 (three) times a week  -     PTH, intact;  Future    Vitamin D deficiency  -     Cholecalciferol (Vitamin D3 Ultra Potency) 1 25 MG (05199 UT) TABS; Take 1 tablet (50,000 Units total) by mouth once a week    Chronic diastolic CHF (congestive heart failure) (HCC)  -     Basic metabolic panel; Future    Persistent proteinuria, unspecified  -     Protein / creatinine ratio, urine; Future    Iron deficiency anemia, unspecified iron deficiency anemia type  -     CBC; Future    Controlled type 2 diabetes mellitus with stage 4 chronic kidney disease, without long-term current use of insulin (HCC)  -     Basic metabolic panel; Future  -     Protein / creatinine ratio, urine; Future    Other orders  -     metFORMIN (GLUCOPHAGE) 500 mg tablet; Take 500 mg by mouth 2 (two) times a day with meals            SUBJECTIVE / HPI:  Eunice Solorzano  is a [de-identified] y o   male with medical issues of chronic kidney disease, HTN x 15 yrs,  DM-2 anemia, colitis  , CAD s/p CABG who presents for follow-up of chronic kidney disease stage 3      Review of records, patient has elevated creatinine dating back to 2018 November when the creatinine was 1 3   It was stable at 1 4 to 1 7 in 2020   Since June 2021 creatinine has been around 1 6-1 8  It had worsened to creatinine 2 49 on 12/14/21, likely prerenal in the setting of abdominal pain and diarrhea and renal function improved to creatinine 1 7-1 8 in January 2022      Patient with baseline creatinine 1 6-1 8 he was at [facility] and underwent cardiac PCI on 05/24/22   Creatinine since May has been around 2 3-2 4, improved to 2 0 on 05/25 likely due to hydration during PCI      He was again admitted from June 14 to June 17, underwent TAVR on June 14th 2022    Recently creatinine has been around 2 0-2 25 since October 2022 this is most likely his new baseline     Labs from 12/7/2022 showed creatinine 2 13 mg/dL      Patient was recently at [facility] in March 2023 underwent common femoral endarterectomy and antegrade intervention of SFA popliteal artery    Creatinine was around 1 84 on March 26, 2023  Did not have any blood work in April but on blood work from May 1 creatinine was elevated 2 35  Patient was told to increase oral hydration and repeat BMP from May 9th shows creatinine 2 45 mg/dL    Hemoglobin was 10 9 g/dL with iron saturation 17%   2, UA without any RBC, UPC ratio 0 23  Uric acid level was 4 5    No new complaints       REVIEW OF SYSTEMS:    Review of Systems   Constitutional: Negative for activity change, appetite change, chills, diaphoresis, fatigue and fever  HENT: Negative for congestion, facial swelling and nosebleeds  Eyes: Negative for pain and visual disturbance  Respiratory: Negative for cough, chest tightness and shortness of breath  Cardiovascular: Negative for chest pain and palpitations  Gastrointestinal: Negative for abdominal distention, abdominal pain, diarrhea, nausea and vomiting  Genitourinary: Negative for difficulty urinating, dysuria, flank pain, frequency and hematuria  Musculoskeletal: Negative for arthralgias, back pain and joint swelling  Skin: Negative for rash  Neurological: Negative for dizziness, seizures, syncope, weakness and headaches  Psychiatric/Behavioral: Negative for agitation and confusion  The patient is not nervous/anxious  More than 10 point review of systems were obtained and discussed in length with the patient  Complete review of systems were negative / unremarkable except mentioned above         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 ([facility] ) 09/10/2021   • 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 ([facility] [address] ) 2/24/2023   • Basal cell carcinoma     right cheek   • CAD (coronary artery disease)    • Carotid stenosis, asymptomatic, bilateral    • Chronic kidney disease    • Colon polyp    • Diabetes ([facility] [address] )     type 2, non-insulin dependent   • Diabetes mellitus ([facility] [address] )    • GERD (gastroesophageal reflux disease)    • History of nephrolithiasis    • Hyperlipidemia    • Hypertension    • Left foot pain 11/30/2022   • PAD (peripheral artery disease) ([facility])    • Severe aortic stenosis    • Squamous cell skin cancer 11/22/2022    left superior helix       PAST SURGICAL HISTORY:  Past Surgical History:   Procedure Laterality Date   • APPENDECTOMY     • CARDIAC CATHETERIZATION N/A 05/05/2022    Procedure: CARDIAC RHC/LHC; Surgeon: Ev Gonzalez DO;  Location: [facility] C[facility]; Service: Cardiology   • CARDIAC CATHETERIZATION N/A 05/05/2022    Procedure: Cardiac Coronary Angiogram;  Surgeon: Ev Gonzalze DO;  Location: [facility] CARD[facility]; Service: Cardiology   • CARDIAC CATHETERIZATION N/A 05/24/2022    Procedure: Cardiac pci;  Surgeon: Leticia Tran MD;  Location: [facility] CARDIAC C[facility];   Service: Cardiology   • CARDIAC CATHETERIZATION N/A 06/14/2022    Procedure: CARDIAC TAVR;  Surgeon: Robert Morrison MD;  Location: [facility] MAIN OR;  Service: Cardiology   • COLECTOMY     • COLONOSCOPY  2013   • CORONARY ARTERY BYPASS GRAFT  2013    X 2   • FEMORAL ARTERY - POPLITEAL ARTERY BYPASS GRAFT     • HEMORRHOID SURGERY     • IR AORTAGRAM WITH RUN-OFF  11/19/2018   • IR AORTAGRAM WITH RUN-OFF  3/2/2023   • IR LOWER EXTREMITY ANGIOGRAM  3/23/2023   • MOHS SURGERY Left 01/11/2023    SCC left superior helix-Dr Tovar   • ID SLCTV CATHJ 3RD+ ORD SLCTV ABDL PEL/LXTR [facility] Left 08/12/2016    Procedure: LEFT FEMORAL ARTERIOGRAM; BALLOON ANGIOPLASTY; SFA  AND FEMORAL AT VEIN GRAFT;  Surgeon: Faby Olivia MD;  Location: [facility] MAIN OR;  Service: Vascular   • ID TEAEC W/WO PATCH GRAFT COMMON FEMORAL Right 3/23/2023    Procedure: Common femoral endarterectomy&antegrade intervention of SFA, popliteal artery w/ shockwave;  Surgeon: Peggy Giron MD;  Location: [facility];  Service: Vascular   • ID TRANSCATHETER TRANSAPICAL REPLACEMT AORTIC VALVE N/A 2022    Procedure: REPLACEMENT AORTIC VALVE TRANSCATHETER (TAVR) TRANSAPICAL 29MM IRVIN KYLER S3 ULTRA VALVE(ACCESS ON LEFT) ELANA;  Surgeon: Katja Hopper DO;  Location: [facility] MAIN OR;  Service: Cardiac Surgery   • SKIN CANCER EXCISION     • TONSILLECTOMY AND ADENOIDECTOMY         SOCIAL HISTORY:  Social History     Substance and Sexual Activity   Alcohol Use Not Currently    Comment: rare     Social History     Substance and Sexual Activity   Drug Use No     Social History     Tobacco Use   Smoking Status Former   • Packs/day: 1 00   • Years: 50 00   • Pack years: 50 00   • Types: Cigarettes   • Quit date: 3/3/2023   • Years since quittin 1   Smokeless Tobacco Never       FAMILY HISTORY:  Family History   Problem Relation Age of Onset   • Heart attack Father    • Other Sister         bypass and vlave replacement   • Stroke Paternal Uncle    • Arrhythmia Neg Hx    • Asthma Neg Hx    • Clotting disorder Neg Hx    • Fainting Neg Hx    • Anuerysm Neg Hx    • Hypertension Neg Hx         unsure    • Hyperlipidemia Neg Hx    • Heart failure Neg Hx        MEDICATIONS:    Current Outpatient Medications:   •  acetaminophen (TYLENOL) 325 mg tablet, Take 3 tablets (975 mg total) by mouth every 8 (eight) hours, Disp: , Rfl: 0  •  allopurinol (ZYLOPRIM) 300 mg tablet, Take 1 tablet (300 mg total) by mouth daily, Disp: 90 tablet, Rfl: 3  •  amLODIPine (NORVASC) 10 mg tablet, TAKE 1 TABLET DAILY, Disp: 90 tablet, Rfl: 3  •  AMYLASE-LIPASE-PROTEASE PO, Take by mouth 2 (two) times a day before meals, Disp: , Rfl:   •  aspirin (ECOTRIN LOW STRENGTH) 81 mg EC tablet, Take 81 mg by mouth daily, Disp: , Rfl:   •  atorvastatin (LIPITOR) 80 mg tablet, TAKE 1 TABLET DAILY AT     BEDTIME, Disp: 90 tablet, Rfl: 3  •  calcitriol (ROCALTROL) 0 25 mcg capsule, Take 1 capsule (0 25 mcg total) by mouth 3 (three) times a week, Disp: 36 capsule, Rfl: 4  •  Cholecalciferol (Vitamin D3 Ultra Potency) 1 25 MG (54440 UT) TABS, "Take 1 tablet (50,000 Units total) by mouth once a week, Disp: 16 tablet, Rfl: 1  •  clopidogrel (PLAVIX) 75 mg tablet, Take 1 tablet (75 mg total) by mouth in the morning , Disp: 90 tablet, Rfl: 3  •  lisinopril (ZESTRIL) 10 mg tablet, Take 1 tablet (10 mg total) by mouth daily, Disp: 90 tablet, Rfl: 3  •  Magnesium Oxide (MAG-200 PO), Take by mouth once a week, Disp: , Rfl:   •  metFORMIN (GLUCOPHAGE) 500 mg tablet, Take 500 mg by mouth 2 (two) times a day with meals, Disp: , Rfl:   •  metoprolol succinate (TOPROL-XL) 25 mg 24 hr tablet, Take 0 5 tablets (12 5 mg total) by mouth daily, Disp: 45 tablet, Rfl: 1  •  pancrelipase, Lip-Prot-Amyl, (CREON) 24,000 units, Take 24,000 units of lipase by mouth 3 (three) times a day with meals, Disp: 300 capsule, Rfl: 2  •  pantoprazole (PROTONIX) 40 mg tablet, TAKE 1 TABLET DAILY, Disp: 90 tablet, Rfl: 1  •  potassium chloride (K-DUR,KLOR-CON) 10 mEq tablet, Take 1 tablet (10 mEq total) by mouth daily, Disp: 90 tablet, Rfl: 3  •  torsemide (DEMADEX) 20 mg tablet, Take 1 tablet (20 mg total) by mouth 2 (two) times a day (Patient taking differently: Take 10 mg by mouth daily), Disp: 180 tablet, Rfl: 3  •  Ascorbic Acid (vitamin C) 100 MG tablet, Take 250 mg by mouth daily (Patient not taking: Reported on 5/10/2023), Disp: , Rfl:   •  Cyanocobalamin (VITAMIN B12 PO), Take by mouth once a week   (Patient not taking: Reported on 5/10/2023), Disp: , Rfl:   •  docusate sodium (COLACE) 100 mg capsule, Take 1 capsule (100 mg total) by mouth 2 (two) times a day (Patient not taking: Reported on 5/10/2023), Disp: , Rfl: 0      PHYSICAL EXAM:  Vitals:    05/10/23 1040 05/10/23 1048   BP: 142/50 150/70   BP Location: Left arm    Patient Position: Sitting    Cuff Size: Large    Pulse:  60   Weight: 77 1 kg (170 lb)    Height: 5' 10\" (1 778 m)      Body mass index is 24 39 kg/m²  Physical Exam  Constitutional:       General: He is not in acute distress       Appearance: He is " well-developed  He is not diaphoretic  HENT:      Head: Normocephalic and atraumatic  Mouth/Throat:      Mouth: Mucous membranes are moist    Eyes:      General: No scleral icterus  Conjunctiva/sclera: Conjunctivae normal       Pupils: Pupils are equal, round, and reactive to light  Neck:      Thyroid: No thyromegaly  Cardiovascular:      Rate and Rhythm: Normal rate and regular rhythm  Heart sounds: Normal heart sounds  No murmur heard  No friction rub  Pulmonary:      Effort: Pulmonary effort is normal  No respiratory distress  Breath sounds: Normal breath sounds  No wheezing or rales  Abdominal:      General: Bowel sounds are normal  There is no distension  Palpations: Abdomen is soft  Tenderness: There is no abdominal tenderness  Musculoskeletal:         General: No deformity  Cervical back: Neck supple  Right lower leg: No edema  Left lower leg: No edema  Lymphadenopathy:      Cervical: No cervical adenopathy  Skin:     Coloration: Skin is not pale  Nails: There is no clubbing  Neurological:      Mental Status: He is alert and oriented to person, place, and time  He is not disoriented  Psychiatric:         Mood and Affect: Mood normal  Mood is not anxious  Affect is not inappropriate  Behavior: Behavior normal          Thought Content:  Thought content normal          Lab Results:   Results for orders placed or performed in visit on 57/48/22   Basic metabolic panel   Result Value Ref Range    Sodium 136 135 - 147 mmol/L    Potassium 4 2 3 5 - 5 3 mmol/L    Chloride 108 96 - 108 mmol/L    CO2 24 21 - 32 mmol/L    ANION GAP 4 4 - 13 mmol/L    BUN 44 (H) 5 - 25 mg/dL    Creatinine 2 45 (H) 0 60 - 1 30 mg/dL    Glucose, Fasting 143 (H) 65 - 99 mg/dL    Calcium 9 3 8 3 - 10 1 mg/dL    eGFR 23 ml/min/1 73sq m

## 2023-05-10 NOTE — PATIENT INSTRUCTIONS
Recommendations:  1  Increase lisinopril to 20mg daily  2  Continue remainder of medication  3  Follow up in 6 months

## 2023-05-11 ENCOUNTER — OFFICE VISIT (OUTPATIENT)
Dept: FAMILY MEDICINE CLINIC | Facility: CLINIC | Age: 78
End: 2023-05-11

## 2023-05-11 VITALS
HEART RATE: 57 BPM | HEIGHT: 70 IN | TEMPERATURE: 98 F | WEIGHT: 170 LBS | RESPIRATION RATE: 18 BRPM | SYSTOLIC BLOOD PRESSURE: 152 MMHG | DIASTOLIC BLOOD PRESSURE: 60 MMHG | OXYGEN SATURATION: 98 % | BODY MASS INDEX: 24.34 KG/M2

## 2023-05-11 DIAGNOSIS — E11.22 CONTROLLED TYPE 2 DIABETES MELLITUS WITH STAGE 4 CHRONIC KIDNEY DISEASE, WITHOUT LONG-TERM CURRENT USE OF INSULIN (HCC): ICD-10-CM

## 2023-05-11 DIAGNOSIS — Z95.2 S/P TAVR (TRANSCATHETER AORTIC VALVE REPLACEMENT): ICD-10-CM

## 2023-05-11 DIAGNOSIS — E78.2 MIXED HYPERLIPIDEMIA: ICD-10-CM

## 2023-05-11 DIAGNOSIS — I10 PRIMARY HYPERTENSION: ICD-10-CM

## 2023-05-11 DIAGNOSIS — I73.9 PVD (PERIPHERAL VASCULAR DISEASE) (HCC): ICD-10-CM

## 2023-05-11 DIAGNOSIS — N18.4 CONTROLLED TYPE 2 DIABETES MELLITUS WITH STAGE 4 CHRONIC KIDNEY DISEASE, WITHOUT LONG-TERM CURRENT USE OF INSULIN (HCC): ICD-10-CM

## 2023-05-11 DIAGNOSIS — N25.81 SECONDARY HYPERPARATHYROIDISM OF RENAL ORIGIN (HCC): Primary | ICD-10-CM

## 2023-05-11 RX ORDER — GLIPIZIDE 5 MG/1
5 TABLET ORAL
Qty: 90 TABLET | Refills: 1 | Status: SHIPPED | OUTPATIENT
Start: 2023-05-11

## 2023-05-17 ENCOUNTER — OFFICE VISIT (OUTPATIENT)
Dept: VASCULAR SURGERY | Facility: CLINIC | Age: 78
End: 2023-05-17

## 2023-05-17 VITALS
WEIGHT: 169 LBS | DIASTOLIC BLOOD PRESSURE: 60 MMHG | HEIGHT: 70 IN | BODY MASS INDEX: 24.2 KG/M2 | SYSTOLIC BLOOD PRESSURE: 138 MMHG | HEART RATE: 58 BPM

## 2023-05-17 DIAGNOSIS — I70.234 ATHEROSCLEROSIS OF NATIVE ARTERY OF RIGHT LOWER EXTREMITY WITH ULCERATION OF MIDFOOT (HCC): Primary | ICD-10-CM

## 2023-05-17 NOTE — ASSESSMENT & PLAN NOTE
S/p RIGHT femoral endarterectomy, right SFA, Pop and AT angioplasty with excellent results  Triphasic AT / Dp signals  Foot wounds have healed  Rest pain has resolved  Walking well without pain  Groin incision has healed well  Will schedule duplex in early June and cancel the one in April  Continue aspirin and plavix daily

## 2023-05-17 NOTE — PROGRESS NOTES
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 (HCC)        Subjective:      Patient ID: Nadine Zamora  is a [de-identified] y o  male  HPI  Patient presents today for 1 month f/u visit and wound check s/p R fem-endarterectomy, R SFA, popliteal and AT angioplasty on 3/23/23  He is walking without pain  Foot swelling has improved  Wounds have healed  The following portions of the patient's history were reviewed and updated as appropriate: allergies, current medications, past family history, past medical history, past social history, past surgical history and problem list     Review of Systems   Constitutional: Negative  HENT: Negative  Eyes: Negative  Respiratory: Negative  Cardiovascular: Negative  Gastrointestinal: Negative  Endocrine: Negative  Genitourinary: Negative  Musculoskeletal: Negative  Skin: Negative  Allergic/Immunologic: Negative  Neurological: Negative  Hematological: Negative  Psychiatric/Behavioral: Negative  I have reviewed the ROS as entered and made changes as necessary  Objective:      /60 (BP Location: Left arm, Patient Position: Sitting)   Pulse 58   Ht 5' 10\" (1 778 m)   Wt 76 7 kg (169 lb)   BMI 24 25 kg/m²          Physical Exam  Vitals and nursing note reviewed  Constitutional:       Appearance: Normal appearance  HENT:      Head: Normocephalic and atraumatic     Cardiovascular:      Pulses:    " Dorsalis pedis pulses are detected w/ Doppler on the right side and detected w/ Doppler on the left side  Comments: Triphasic AT signals bilateral  Abdominal:      Comments: Right groin incision has healed well  Musculoskeletal:      Right lower leg: No edema  Left lower leg: No edema  Skin:     General: Skin is warm and dry  Capillary Refill: Capillary refill takes less than 2 seconds  Neurological:      General: No focal deficit present  Mental Status: He is oriented to person, place, and time     Psychiatric:         Mood and Affect: Mood normal          Behavior: Behavior normal

## 2023-05-24 ENCOUNTER — APPOINTMENT (OUTPATIENT)
Dept: LAB | Facility: MEDICAL CENTER | Age: 78
End: 2023-05-24

## 2023-05-24 ENCOUNTER — TELEPHONE (OUTPATIENT)
Dept: NEPHROLOGY | Facility: CLINIC | Age: 78
End: 2023-05-24

## 2023-05-24 DIAGNOSIS — N18.4 CKD (CHRONIC KIDNEY DISEASE) STAGE 4, GFR 15-29 ML/MIN (HCC): Primary | ICD-10-CM

## 2023-05-24 DIAGNOSIS — N18.4 CKD (CHRONIC KIDNEY DISEASE) STAGE 4, GFR 15-29 ML/MIN (HCC): ICD-10-CM

## 2023-05-24 LAB
ANION GAP SERPL CALCULATED.3IONS-SCNC: 1 MMOL/L (ref 4–13)
BUN SERPL-MCNC: 54 MG/DL (ref 5–25)
C3 SERPL-MCNC: 97.3 MG/DL (ref 90–180)
C4 SERPL-MCNC: 38 MG/DL (ref 10–40)
CALCIUM SERPL-MCNC: 8.9 MG/DL (ref 8.3–10.1)
CHLORIDE SERPL-SCNC: 112 MMOL/L (ref 96–108)
CO2 SERPL-SCNC: 25 MMOL/L (ref 21–32)
CREAT SERPL-MCNC: 2.6 MG/DL (ref 0.6–1.3)
GFR SERPL CREATININE-BSD FRML MDRD: 22 ML/MIN/1.73SQ M
GLUCOSE P FAST SERPL-MCNC: 122 MG/DL (ref 65–99)
POTASSIUM SERPL-SCNC: 5.1 MMOL/L (ref 3.5–5.3)
SODIUM SERPL-SCNC: 138 MMOL/L (ref 135–147)

## 2023-05-24 NOTE — TELEPHONE ENCOUNTER
Discussed about labs with patient   Renal function worsened to cr 2 6 due to higher dose dose of lisinopril since early this month   K5 1- stopped potassium tablets   BMP in two weeks       Vitamin D 17 0 - on vitamin D 49836 weekly
A&Ox3

## 2023-05-30 DIAGNOSIS — K21.9 GASTROESOPHAGEAL REFLUX DISEASE, UNSPECIFIED WHETHER ESOPHAGITIS PRESENT: ICD-10-CM

## 2023-05-30 RX ORDER — PANTOPRAZOLE SODIUM 40 MG/1
TABLET, DELAYED RELEASE ORAL
Qty: 90 TABLET | Refills: 1 | Status: SHIPPED | OUTPATIENT
Start: 2023-05-30

## 2023-06-06 ENCOUNTER — HOSPITAL ENCOUNTER (OUTPATIENT)
Dept: RADIOLOGY | Facility: MEDICAL CENTER | Age: 78
Discharge: HOME | End: 2023-06-06
Payer: MEDICARE

## 2023-06-06 DIAGNOSIS — I65.23 ASYMPTOMATIC BILATERAL CAROTID ARTERY STENOSIS: Chronic | ICD-10-CM

## 2023-06-06 DIAGNOSIS — I70.213 ATHEROSCLEROSIS OF NATIVE ARTERIES OF EXTREMITIES WITH INTERMITTENT CLAUDICATION, BILATERAL LEGS (HCC): ICD-10-CM

## 2023-06-06 DIAGNOSIS — N18.4 CKD (CHRONIC KIDNEY DISEASE) STAGE 4, GFR 15-29 ML/MIN (HCC): ICD-10-CM

## 2023-06-06 PROCEDURE — 93880 EXTRACRANIAL BILAT STUDY: CPT

## 2023-06-06 PROCEDURE — 93923 UPR/LXTR ART STDY 3+ LVLS: CPT

## 2023-06-06 PROCEDURE — 93925 LOWER EXTREMITY STUDY: CPT

## 2023-06-07 PROCEDURE — 93922 UPR/L XTREMITY ART 2 LEVELS: CPT | Performed by: SURGERY

## 2023-06-07 PROCEDURE — 93880 EXTRACRANIAL BILAT STUDY: CPT | Performed by: SURGERY

## 2023-06-07 PROCEDURE — 93925 LOWER EXTREMITY STUDY: CPT | Performed by: SURGERY

## 2023-06-08 ENCOUNTER — TRANSCRIBE ORDERS (OUTPATIENT)
Dept: VASCULAR SURGERY | Facility: CLINIC | Age: 78
End: 2023-06-08

## 2023-06-08 ENCOUNTER — HOSPITAL ENCOUNTER (OUTPATIENT)
Dept: NON INVASIVE DIAGNOSTICS | Facility: MEDICAL CENTER | Age: 78
Discharge: HOME | End: 2023-06-08
Payer: MEDICARE

## 2023-06-08 VITALS
HEART RATE: 61 BPM | SYSTOLIC BLOOD PRESSURE: 138 MMHG | HEIGHT: 70 IN | BODY MASS INDEX: 24.2 KG/M2 | WEIGHT: 169 LBS | DIASTOLIC BLOOD PRESSURE: 60 MMHG

## 2023-06-08 DIAGNOSIS — I35.0 AORTIC STENOSIS, SEVERE: ICD-10-CM

## 2023-06-08 DIAGNOSIS — I65.23 ASYMPTOMATIC BILATERAL CAROTID ARTERY STENOSIS: Primary | ICD-10-CM

## 2023-06-08 DIAGNOSIS — Z95.2 S/P TAVR (TRANSCATHETER AORTIC VALVE REPLACEMENT): ICD-10-CM

## 2023-06-08 DIAGNOSIS — I70.213 ATHEROSCLEROSIS OF NATIVE ARTERIES OF EXTREMITIES WITH INTERMITTENT CLAUDICATION, BILATERAL LEGS (HCC): ICD-10-CM

## 2023-06-08 DIAGNOSIS — I70.234 ATHEROSCLEROSIS OF NATIVE ARTERY OF RIGHT LOWER EXTREMITY WITH ULCERATION OF MIDFOOT (HCC): Primary | ICD-10-CM

## 2023-06-08 LAB
AORTIC ROOT: 2.7 CM
AORTIC VALVE MEAN VELOCITY: 13.5 M/S
APICAL FOUR CHAMBER EJECTION FRACTION: 69 %
ASCENDING AORTA: 2.9 CM
AV MEAN GRADIENT: 8 MMHG
AV PEAK GRADIENT: 13 MMHG
AV REGURGITATION PRESSURE HALF TIME: 459 MS
DOP CALC AO PEAK VEL: 1.81 M/S
DOP CALC AO VTI: 54.13 CM
DOP CALC LVOT AREA: 4.15 CM2
DOP CALC LVOT DIAMETER: 2.3 CM
E WAVE DECELERATION TIME: 434 MS
FRACTIONAL SHORTENING: 27 (ref 28–44)
INTERVENTRICULAR SEPTUM IN DIASTOLE (PARASTERNAL SHORT AXIS VIEW): 1.4 CM
INTERVENTRICULAR SEPTUM: 1.4 CM (ref 0.6–1.1)
LAAS-AP2: 18.4 CM2
LAAS-AP4: 16.6 CM2
LEFT ATRIUM SIZE: 4 CM
LEFT INTERNAL DIMENSION IN SYSTOLE: 3.5 CM (ref 2.1–4)
LEFT VENTRICLE DIASTOLIC VOLUME (MOD BIPLANE): 84 ML
LEFT VENTRICLE SYSTOLIC VOLUME (MOD BIPLANE): 30 ML
LEFT VENTRICULAR INTERNAL DIMENSION IN DIASTOLE: 4.8 CM (ref 3.5–6)
LEFT VENTRICULAR POSTERIOR WALL IN END DIASTOLE: 1.4 CM
LEFT VENTRICULAR STROKE VOLUME: 58 ML
LV EF: 64 %
LVSV (TEICH): 58 ML
MV E'TISSUE VEL-SEP: 5 CM/S
MV PEAK A VEL: 1.25 M/S
MV PEAK E VEL: 75 CM/S
MV STENOSIS PRESSURE HALF TIME: 126 MS
MV VALVE AREA P 1/2 METHOD: 1.75
RIGHT ATRIUM AREA SYSTOLE A4C: 13 CM2
RIGHT VENTRICLE ID DIMENSION: 3.5 CM
SL CV AV DECELERATION TIME RETROGRADE: 1584 MS
SL CV AV PEAK GRADIENT RETROGRADE: 16 MMHG
SL CV LEFT ATRIUM LENGTH A2C: 5.3 CM
SL CV LV EF: 65
SL CV PED ECHO LEFT VENTRICLE DIASTOLIC VOLUME (MOD BIPLANE) 2D: 107 ML
SL CV PED ECHO LEFT VENTRICLE SYSTOLIC VOLUME (MOD BIPLANE) 2D: 50 ML
TRICUSPID ANNULAR PLANE SYSTOLIC EXCURSION: 1.8 CM

## 2023-06-08 PROCEDURE — 93306 TTE W/DOPPLER COMPLETE: CPT | Performed by: INTERNAL MEDICINE

## 2023-06-08 PROCEDURE — 93306 TTE W/DOPPLER COMPLETE: CPT

## 2023-06-13 ENCOUNTER — OFFICE VISIT (OUTPATIENT)
Dept: CARDIAC SURGERY | Facility: CLINIC | Age: 78
End: 2023-06-13
Payer: MEDICARE

## 2023-06-13 VITALS
HEIGHT: 70 IN | SYSTOLIC BLOOD PRESSURE: 162 MMHG | WEIGHT: 170 LBS | OXYGEN SATURATION: 100 % | HEART RATE: 60 BPM | DIASTOLIC BLOOD PRESSURE: 60 MMHG | BODY MASS INDEX: 24.34 KG/M2

## 2023-06-13 DIAGNOSIS — Z95.3 STATUS POST TRANSCATHETER AORTIC VALVE REPLACEMENT (TAVR) USING BIOPROSTHESIS: Primary | ICD-10-CM

## 2023-06-13 PROCEDURE — 93000 ELECTROCARDIOGRAM COMPLETE: CPT | Performed by: PHYSICIAN ASSISTANT

## 2023-06-13 PROCEDURE — 99214 OFFICE O/P EST MOD 30 MIN: CPT | Performed by: PHYSICIAN ASSISTANT

## 2023-06-13 RX ORDER — MULTIVITAMIN
1 TABLET ORAL DAILY
COMMUNITY

## 2023-06-13 NOTE — PROGRESS NOTES
1 YEAR FOLLOW UP VISIT S/P TAVR    Procedure: S/P transcatheter aortic valve replacement, performed on 6/14/23  History of Present Illness: Lore Royal  is a [de-identified]y o  year old male who presents to our office today for one year follow up care from transcatheter aortic valve replacement  He was last evaluated in our office in July of last year, and was doing well at that time  He maintains follow up with cardiology, nephrology and PCP  Most recently, in March of this year, patient underwent right common femoral endarterectomy for chronic claudication  He reports that he is able to walk without pain now  He remains active, doing gardening and other outdoor work, and denies chest pain, DEMARCO, LE edema, orthopnea, PND  He had quit smoking after his vascular surgery, however recently started smoking again, up to about 1/2 ppd  He has been compliant with his medications, and feels satisfied with his functional capabilities after TAVR       Review of Systems:  Review of Systems - History obtained from chart review and the patient  General ROS: negative  Psychological ROS: negative  Ophthalmic ROS: negative  ENT ROS: negative  Allergy and Immunology ROS: negative  Hematological and Lymphatic ROS: negative  Endocrine ROS: negative  Respiratory ROS: no cough, shortness of breath, or wheezing  Cardiovascular ROS: no chest pain or dyspnea on exertion  Gastrointestinal ROS: no abdominal pain, change in bowel habits, or black or bloody stools  Genito-Urinary ROS: no dysuria, trouble voiding, or hematuria  Musculoskeletal ROS: negative  Neurological ROS: no TIA or stroke symptoms  Dermatological ROS: negative     Past Medical History:    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 ([facility] ) 09/10/2021   • 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 ([facility] ) 2/24/2023   • Basal cell carcinoma     right cheek   • CAD (coronary artery disease)    • Carotid stenosis, asymptomatic, bilateral    • Chronic kidney disease    • Colon polyp    • Diabetes ([facility] )     type 2, non-insulin dependent   • Diabetes mellitus (HCC)    • GERD (gastroesophageal reflux disease)    • History of nephrolithiasis    • Hyperlipidemia    • Hypertension    • Left foot pain 11/30/2022   • PAD (peripheral artery disease) ([facility]    • Severe aortic stenosis    • Squamous cell skin cancer 11/22/2022    left superior helix       Past Surgical History:    Past Surgical History:   Procedure Laterality Date   • APPENDECTOMY     • CARDIAC CATHETERIZATION N/A 05/05/2022    Procedure: CARDIAC RHC/LHC; Surgeon: Vamshi Arellano DO;  [facility]: [facility]; Service: Cardiology   • CARDIAC CATHETERIZATION N/A 05/05/2022    Procedure: Cardiac Coronary Angiogram;  Surgeon: Vamshi Arellano DO;  [facility]; Service: Cardiology   • CARDIAC CATHETERIZATION N/A 05/24/2022    Procedure: Cardiac pci;  Surgeon: Shilpi Moreno MD;  Location: [facility] CARD[facility];   Service: Cardiology   • CARDIAC CATHETERIZATION N/A 06/14/2022    Procedure: CARDIAC TAVR;  Surgeon: Marleen Alas MD;  Location: [facility] MAIN OR;  Service: Cardiology   • COLECTOMY     • COLONOSCOPY  2013   • CORONARY ARTERY BYPASS GRAFT  2013    X 2   • FEMORAL ARTERY - POPLITEAL ARTERY BYPASS GRAFT     • HEMORRHOID SURGERY     • IR AORTAGRAM WITH RUN-OFF  11/19/2018   • IR AORTAGRAM WITH RUN-OFF  3/2/2023   • IR LOWER EXTREMITY ANGIOGRAM  3/23/2023   • MOHS SURGERY Left 01/11/2023    SCC left superior helix-Dr Tovar   • ND SLCTV CATHJ 3RD+ ORD SLCTV ABDL PEL/LXTR [facility] Left 08/12/2016    Procedure: LEFT FEMORAL ARTERIOGRAM; BALLOON ANGIOPLASTY; SFA  AND FEMORAL AT VEIN GRAFT;  Surgeon: Ronan Rogers MD;  Location: [facility] MAIN OR;  Service: Vascular   • ND TEAEC W/WO PATCH GRAFT COMMON FEMORAL Right 3/23/2023    Procedure: Common femoral endarterectomy&antegrade intervention "of SFA, popliteal artery w/ shockwave;  Surgeon: Buddy Oreilly MD;  Location: [facility] Main [facility];  Service: Vascular   • UT TRANSCATHETER TRANSAPICAL REPLACEMT AORTIC VALVE N/A 06/14/2022    Procedure: REPLACEMENT AORTIC VALVE TRANSCATHETER (TAVR) TRANSAPICAL 29MM IRVIN KYLER S3 ULTRA VALVE(ACCESS ON LEFT) ELANA;  Surgeon: Josseline Jefferson DO;  Location: [facility] MAIN OR;  Service: Cardiac Surgery   • SKIN CANCER EXCISION     • TONSILLECTOMY AND ADENOIDECTOMY         Vital Signs:     Vitals:    06/13/23 1310 06/13/23 1319   BP: 140/57 162/60   BP Location: Left arm Right arm   Patient Position: Sitting Sitting   Cuff Size: Standard Standard   Pulse: 60    SpO2: 100%    Weight: 77 1 kg (170 lb)    Height: 5' 10\" (1 778 m)          Home Medications:     Prior to Admission medications    Medication Sig Start Date End Date Taking?  Authorizing Provider   allopurinol (ZYLOPRIM) 300 mg tablet Take 1 tablet (300 mg total) by mouth daily 10/27/22  Yes Hector Pérez MD   amLODIPine (NORVASC) 10 mg tablet TAKE 1 TABLET DAILY 12/2/22  Yes Dorinda Lee MD   AMYLASE-LIPASE-PROTEASE PO Take by mouth 2 (two) times a day before meals   Yes Historical Provider, MD   Ascorbic Acid (vitamin C) 100 MG tablet Take 250 mg by mouth daily   Yes Historical Provider, MD   aspirin (ECOTRIN LOW STRENGTH) 81 mg EC tablet Take 81 mg by mouth daily   Yes Historical Provider, MD   atorvastatin (LIPITOR) 80 mg tablet TAKE 1 TABLET DAILY AT     BEDTIME 2/22/23  Yes Dorinda Lee MD   calcitriol (ROCALTROL) 0 25 mcg capsule Take 1 capsule (0 25 mcg total) by mouth 3 (three) times a week 5/10/23  Yes Shena Diana MD   Cholecalciferol (Vitamin D3 Ultra Potency) 1 25 MG (79439 UT) TABS Take 1 tablet (50,000 Units total) by mouth once a week 5/10/23  Yes Shena Diana MD   clopidogrel (PLAVIX) 75 mg tablet Take 1 tablet (75 mg total) by mouth in the morning  5/25/22  Yes ZHANNA Dowling   glipiZIDE (GLUCOTROL) 5 mg tablet Take 1 tablet (5 " mg total) by mouth daily with breakfast  Patient taking differently: Take 5 mg by mouth daily with breakfast Taking 2 5 mg he cuts 1 tablet in half due to shakes  5/11/23  Yes John Ramsey MD   lisinopril (ZESTRIL) 20 mg tablet Take 1 tablet (20 mg total) by mouth daily 5/10/23  Yes Jeferson Clark MD   metoprolol succinate (TOPROL-XL) 25 mg 24 hr tablet Take 0 5 tablets (12 5 mg total) by mouth daily 9/26/22  Yes Jeferson Clark MD   Multiple Vitamin (multivitamin) tablet Take 1 tablet by mouth daily Takes when he can remember  Yes Historical Provider, MD   pancrelipase, Lip-Prot-Amyl, (CREON) 24,000 units Take 24,000 units of lipase by mouth 3 (three) times a day with meals 2/3/23  Yes Kathi Prakash MD   pantoprazole (PROTONIX) 40 mg tablet TAKE 1 TABLET DAILY 5/30/23  Yes Nathan Mckeon PA-C   torsemide (DEMADEX) 20 mg tablet Take 1 tablet (20 mg total) by mouth 2 (two) times a day  Patient taking differently: Take 10 mg by mouth daily Takes 10 mg once a day  9/26/22  Yes Jeferson Clark MD   acetaminophen (TYLENOL) 325 mg tablet Take 3 tablets (975 mg total) by mouth every 8 (eight) hours  Patient not taking: Reported on 6/13/2023 6/17/22   Ryley Vasquez PA-C   Cyanocobalamin (VITAMIN B12 PO) Take by mouth once a week  Patient not taking: Reported on 6/13/2023    Historical Provider, MD   docusate sodium (COLACE) 100 mg capsule Take 1 capsule (100 mg total) by mouth 2 (two) times a day  Patient not taking: Reported on 6/13/2023 6/17/22   Ryley Vasquez PA-C   Magnesium Oxide (MAG-200 PO) Take by mouth once a week  Patient not taking: Reported on 6/13/2023    Historical Provider, MD       Allergies:    No Known Allergies    Physical Exam:    General: alert, oriented, NAD  HEENT/NECK:  PERRL  No jugular venous distention  Cardiac:Regular rate and rhythm  Pulmonary:Breath sounds clear bilaterally  Abdomen:  Non-tender, Non-distended  Positive bowel sounds    Upper extremities: 2+ radial pulses; brisk capillary refill  Lower extremities: Extremities warm/dry  PT/DP pulses 2+ bilaterally  No edema B/L  Musculoskeletal: LÓPEZ  Neuro: Alert and oriented X 3  Sensation is grossly intact  No focal deficits  Skin: Warm/Dry, without rashes or lesions  Lab Results:   Lab Results   Component Value Date    HCT 34 8 (L) 05/01/2023    HGB 10 9 (L) 05/01/2023    MCV 97 05/01/2023     05/01/2023    WBC 6 24 05/01/2023     Lab Results   Component Value Date    BUN 54 (H) 05/24/2023    CALCIUM 8 9 05/24/2023     (H) 05/24/2023    CO2 25 05/24/2023    CREATININE 2 60 (H) 05/24/2023    GLUCOSE 163 (H) 03/23/2023    K 5 1 05/24/2023     11/22/2015       Imaging Studies:     Echocardiogram: 6/8/23  Findings    Left Ventricle Left ventricular cavity size is normal  Wall thickness is moderately increased  The left ventricular ejection fraction is 65%  Systolic function is normal   Wall motion is normal  Diastolic function is mildly abnormal, consistent with grade I (abnormal) relaxation  Right Ventricle Right ventricular cavity size is normal  Systolic function is normal  Wall thickness is normal       Left Atrium The atrium is normal in size  Right Atrium The atrium is normal in size  Aortic Valve There is an Smith KYLER 3 Ultra 29 mm TAVR bioprosthetic valve  The prosthetic valve appears well-seated and appears to be functioning normally  There is trace paravalvular regurgitation  There is no evidence of transvalvular regurgitation  The gradient recorded across the prosthetic aortic valve is within the expected range  The aortic valve peak velocity is 1 81 m/s  The aortic valve peak gradient is 13 mmHg  The aortic valve mean gradient is 8 mmHg  Aortic valve area is 1 76 cm²  Mitral Valve There is no evidence of regurgitation  There is no evidence of stenosis  The mitral valve has normal structure and normal function        Tricuspid Valve Tricuspid valve structure is normal  There is no evidence of regurgitation  There is no evidence of stenosis  Pulmonic Valve Pulmonic valve structure is normal  There is no evidence of regurgitation  There is no evidence of stenosis  Ascending Aorta The aortic root is normal in size  IVC/SVC The inferior vena cava is normal in size  Pericardium There is no pericardial effusion  The pericardium is normal in appearance  [id number]  Pending     I have personally reviewed pertinent reports  TAVR evaluation Assessment:     Bran San 122: I    KCCQ-12 completed     Assessment: Aortic stenosis, Non-Rheumatic  S/P transcatheter aortic valve replacement;    Plan:     Tammi Cooney  returns to our office today for 1 year routine follow up s/p  transcatheter aortic valve replacement   Their NYHA functional status is class I  They are asymptomatic with activities  Weight and VS are stable  Recent echocardiogram demonstrates well seated valve, normal function, trace PVL  ECG, CBC and BMP are reviewed and WNL  I reviewed medications and made no changes  We recommend continued aspirin and low dose statin therapy as tolerated  Tammi Cooney  does not need to return to our office for follow up in the future but will continue to be managed by their primary care physician and cardiologist for ongoing medical care  We recommend yearly echocardiograms which can be ordered and monitored by their cardiologist   Tammi Cooney  was comfortable with our recommendations and their questions were answered to their satisfaction      Routine referral to gastroenterology for colonoscopy screening was not indicated, as the patient is over 74years old    Johnnie Evans PA-C  [unfilled]  1:29 PM

## 2023-07-07 ENCOUNTER — APPOINTMENT (OUTPATIENT)
Dept: LAB | Facility: MEDICAL CENTER | Age: 78
End: 2023-07-07
Payer: MEDICARE

## 2023-07-07 DIAGNOSIS — N18.4 CONTROLLED TYPE 2 DIABETES MELLITUS WITH STAGE 4 CHRONIC KIDNEY DISEASE, WITHOUT LONG-TERM CURRENT USE OF INSULIN (HCC): ICD-10-CM

## 2023-07-07 DIAGNOSIS — E11.22 CONTROLLED TYPE 2 DIABETES MELLITUS WITH STAGE 4 CHRONIC KIDNEY DISEASE, WITHOUT LONG-TERM CURRENT USE OF INSULIN (HCC): ICD-10-CM

## 2023-07-07 DIAGNOSIS — N18.4 CKD (CHRONIC KIDNEY DISEASE) STAGE 4, GFR 15-29 ML/MIN (HCC): ICD-10-CM

## 2023-07-07 LAB
ALBUMIN SERPL BCP-MCNC: 3.5 G/DL (ref 3.5–5)
ALP SERPL-CCNC: 98 U/L (ref 46–116)
ALT SERPL W P-5'-P-CCNC: 11 U/L (ref 12–78)
ANION GAP SERPL CALCULATED.3IONS-SCNC: 7 MMOL/L
AST SERPL W P-5'-P-CCNC: 13 U/L (ref 5–45)
BILIRUB SERPL-MCNC: 0.35 MG/DL (ref 0.2–1)
BUN SERPL-MCNC: 36 MG/DL (ref 5–25)
CALCIUM SERPL-MCNC: 8.9 MG/DL (ref 8.3–10.1)
CHLORIDE SERPL-SCNC: 113 MMOL/L (ref 96–108)
CO2 SERPL-SCNC: 26 MMOL/L (ref 21–32)
CREAT SERPL-MCNC: 2.17 MG/DL (ref 0.6–1.3)
GFR SERPL CREATININE-BSD FRML MDRD: 27 ML/MIN/1.73SQ M
GLUCOSE P FAST SERPL-MCNC: 121 MG/DL (ref 65–99)
POTASSIUM SERPL-SCNC: 4.7 MMOL/L (ref 3.5–5.3)
PROT SERPL-MCNC: 6.6 G/DL (ref 6.4–8.4)
SODIUM SERPL-SCNC: 146 MMOL/L (ref 135–147)

## 2023-07-07 PROCEDURE — 80053 COMPREHEN METABOLIC PANEL: CPT

## 2023-07-07 PROCEDURE — 36415 COLL VENOUS BLD VENIPUNCTURE: CPT

## 2023-07-07 PROCEDURE — 83036 HEMOGLOBIN GLYCOSYLATED A1C: CPT

## 2023-07-10 LAB
EST. AVERAGE GLUCOSE BLD GHB EST-MCNC: 134 MG/DL
HBA1C MFR BLD: 6.3 %

## 2023-07-13 ENCOUNTER — TELEPHONE (OUTPATIENT)
Dept: NEPHROLOGY | Facility: CLINIC | Age: 78
End: 2023-07-13

## 2023-07-13 DIAGNOSIS — K86.81 EXOCRINE PANCREATIC INSUFFICIENCY: ICD-10-CM

## 2023-07-13 NOTE — TELEPHONE ENCOUNTER
Creon    300 capsule    [facility] in [place], Dr Arnett Bence    Patient started his last refill, requesting the Creon to be faxed to [address] [facility] in [facility] [place]

## 2023-07-14 NOTE — TELEPHONE ENCOUNTER
Patient call the RX refill line again, he is very upset because the Creon was suppose to go to [facility] in [facility] and it was sent to [facility] mail order by Dr Analia Chew. Patient states [facility] mail [facility] called him an stated the order has been shipped. I advised patient to call [facility] mail [facility] to see if he can return the order to them unopened.

## 2023-07-17 ENCOUNTER — TELEPHONE (OUTPATIENT)
Age: 78
End: 2023-07-17

## 2023-07-17 ENCOUNTER — NURSE TRIAGE (OUTPATIENT)
Age: 78
End: 2023-07-17

## 2023-07-17 DIAGNOSIS — K86.81 EXOCRINE PANCREATIC INSUFFICIENCY: ICD-10-CM

## 2023-07-17 NOTE — ADDENDUM NOTE
Spoke with pt will keep scheduled appt       ----- Message from Elvia Escoto MA sent at 9/22/2020 10:06 AM CDT -----  Regarding: FW: Patient Call Back  Contact: Patient  Pt would like to know if she can come in for today for her procedure?   ----- Message -----  From: Britt Shrestha  Sent: 9/21/2020  10:42 AM CDT  To: Claire MCCLURE Staff  Subject: Patient Call Back                                Type: Patient Call Back    Who called: Patient     What is the request in detail: Pt is requesting a call back in regards to rescheduling her procedure for today.    Can the clinic reply by [facility]?    Would the patient rather a call back or a response via [facility]? Call back     Best call back number: [phone]                 Addended by: Abhi Rodriguez on: 7/17/2023 04:08 PM     Modules accepted: Orders

## 2023-07-17 NOTE — TELEPHONE ENCOUNTER
Forwarding to your attention. Refill would need to be marked to print and office would need to fax to requested pharmacy.

## 2023-07-17 NOTE — TELEPHONE ENCOUNTER
----- Message from Rosaura Menezes sent at 7/17/2023  3:25 PM EDT -----  Patient called the refill line, upset because because Dr Nilson Santana sent his refill for Creon to his local pharmacy. Patient is asking for this medication to be FAXED TO: [facility] [phone] . He states a script for 300 capules with refills is what he usually gets. Can someone please make sure this script is Faxed to [facility]. This is the 3rd time this patient has called me upset.  Thank you

## 2023-07-19 DIAGNOSIS — K86.81 EXOCRINE PANCREATIC INSUFFICIENCY: ICD-10-CM

## 2023-07-19 NOTE — TELEPHONE ENCOUNTER
Pt calling in regards to his CREON. Pt stating this medication was sent to the wrong pharmacy. Pt states this medication should be faxed to: [facility] (urs [phone] ). Please confirm with pt.

## 2023-07-19 NOTE — TELEPHONE ENCOUNTER
I printed out and faxed patient's Creon to fax number provided. I then called patient and informed him that I did so. I advised him that if he has any problems with this to please give the office a call back.

## 2023-07-23 ENCOUNTER — NURSE TRIAGE (OUTPATIENT)
Dept: OTHER | Facility: OTHER | Age: 78
End: 2023-07-23

## 2023-07-23 NOTE — TELEPHONE ENCOUNTER
Reason for Disposition  • Third attempt to contact caller AND no contact made. Phone number verified.     Protocols used: NO CONTACT OR DUPLICATE CONTACT CALL-ADULT-

## 2023-07-23 NOTE — TELEPHONE ENCOUNTER
Regarding: Medication Question and Poss Refill  ----- Message from Mare Cheng sent at 7/23/2023  9:32 AM EDT -----  " I am out of my Metoprolol succinate, I am not sure if my Doctor wants me to continue taking it. I use a mail order Pharmacy so if I need to keep taking it It needs to be called into my Local Pharmacy for some of the tablets. Medication Refill Request     Name metoprolol succinate (TOPROL-XL) 25 mg 24 hr tablet   Dose/Frequency Take 0.5 tablets (12.5 mg total) by mouth daily  Quantity 45 tablet  Verified pharmacy   [ x]  Verified ordering Provider   [ x]  Does patient have enough for the next 3 days?  Yes [ ] No [x ]

## 2023-07-24 ENCOUNTER — TELEPHONE (OUTPATIENT)
Dept: CARDIOLOGY CLINIC | Facility: CLINIC | Age: 78
End: 2023-07-24

## 2023-07-24 DIAGNOSIS — Z95.2 S/P TAVR (TRANSCATHETER AORTIC VALVE REPLACEMENT): ICD-10-CM

## 2023-07-24 RX ORDER — METOPROLOL SUCCINATE 25 MG/1
12.5 TABLET, EXTENDED RELEASE ORAL DAILY
Qty: 7 TABLET | Refills: 0 | Status: SHIPPED | OUTPATIENT
Start: 2023-07-24 | End: 2023-07-24 | Stop reason: SDUPTHER

## 2023-07-24 RX ORDER — METOPROLOL SUCCINATE 25 MG/1
12.5 TABLET, EXTENDED RELEASE ORAL DAILY
Qty: 90 TABLET | Refills: 1 | Status: SHIPPED | OUTPATIENT
Start: 2023-07-24 | End: 2023-08-02 | Stop reason: SDUPTHER

## 2023-07-24 NOTE — TELEPHONE ENCOUNTER
This is Ric Beavers March 19th, 1945. I spoke to somebody down there and they told me that the doctor El Fraser or somebody had ordered a one week supply of metoprolol succinate, 25 milligram tablets, 45 tablets in the mail order. Then that they confirmed that it's coming in about a week. But they also said they ordered a one week supply at the [facility], the local pharmacy. I went there and there was nothing there. So I'm kind of confused here. So what's going on? The girl checked both computers for the metoprolol succinate 25 milligram tablets for Ric Beavers. Birth date March 19th, 1945. So I'm kind of confused because somebody, I don't know if it was a nurse or called me and said they had ordered that. So now I went all the way down there for nothing. Please call me back or reorder metoprolol suction a 25 milligram tablets enough for one week. My number is [phone]. Thank you.

## 2023-07-24 NOTE — TELEPHONE ENCOUNTER
I took the liberty of calling the patient back and explained two prescriptions cannot be ordered back to back because it will cancel out the orders. I called a seven day supply of Metoprolol into [facility] and he will  tomorrow.

## 2023-07-24 NOTE — TELEPHONE ENCOUNTER
Once we put a refill through, it doesn't allow us to state " spoke with pt" . It was documented that it was sent to the pharmacy.

## 2023-07-24 NOTE — TELEPHONE ENCOUNTER
That's Jose Fitzpatrick, date of birth March 19th, 1945. I spoke yesterday to the [employer] people and they I gave them information. I ran out of metoprolol succinate 25 milligram tablets prescribed by Doctor Robinson Bob and I needed to know if he wanted me to continue on that and send a prescription to [facility] [place] mail order and no one. And they called me back but they only let it ring three times on my cell phone and they and they hung up. So I don't know if I should continue on this. That's 25 milligram tablets that says take 1/2 tablet daily or whether he wants me to stop that. If he orders through mail order, it'll take about 7 or 8 days to get here. Otherwise, if he wants me to stay on it, I would probably need [facility]. Otherwise it would be [facility] mail order metoprolol. Succinate 25 milligram tablets, 45 day supply because I cut them in half. Thank you. Jeanie, my cell number is [phone] and my home number is I'm calling from that [phone]. Thank you.

## 2023-07-26 ENCOUNTER — RA CDI HCC (OUTPATIENT)
Dept: OTHER | Facility: HOSPITAL | Age: 78
End: 2023-07-26

## 2023-07-31 ENCOUNTER — TELEPHONE (OUTPATIENT)
Dept: CARDIOLOGY CLINIC | Facility: CLINIC | Age: 78
End: 2023-07-31

## 2023-07-31 DIAGNOSIS — I25.10 CORONARY ARTERY DISEASE INVOLVING NATIVE HEART WITHOUT ANGINA PECTORIS, UNSPECIFIED VESSEL OR LESION TYPE: ICD-10-CM

## 2023-07-31 RX ORDER — CLOPIDOGREL BISULFATE 75 MG/1
75 TABLET ORAL DAILY
Qty: 30 TABLET | Refills: 1 | Status: SHIPPED | OUTPATIENT
Start: 2023-07-31 | End: 2023-08-01 | Stop reason: SDUPTHER

## 2023-07-31 RX ORDER — CLOPIDOGREL BISULFATE 75 MG/1
75 TABLET ORAL DAILY
Qty: 30 TABLET | Refills: 1 | Status: SHIPPED | OUTPATIENT
Start: 2023-07-31 | End: 2023-07-31 | Stop reason: SDUPTHER

## 2023-07-31 RX ORDER — CLOPIDOGREL BISULFATE 75 MG/1
75 TABLET ORAL DAILY
Qty: 90 TABLET | Refills: 3 | OUTPATIENT
Start: 2023-07-31

## 2023-07-31 NOTE — TELEPHONE ENCOUNTER
Patient called, he needs immediate refill on plavix, he is out of medication and needs it filled today to the [facility]

## 2023-07-31 NOTE — TELEPHONE ENCOUNTER
This is the third time I call Felix Gonzalez date of birth, March 19th, 1945. Regarding the medication Clomid April 75 milligram tablets, which I ran out of today and didn't realize the prescriptions were no longer good. I've called three times or twice to the RX people. And to call in today at a point an RX today for Clopidogrel because I'm completely out of it, was prescribed by Doctor Lenny Dickerson and I have and I haven't heard a thing from anybody whether the prescription was called in to [facility] because I'm completely out. I didn't take it today and I don't understand why no one has called me back to see if the prescription was sent in by somebody. So my phone number is [phone]. I don't know who else to call. Thank you.

## 2023-07-31 NOTE — TELEPHONE ENCOUNTER
Called pt and left a voice mail stating we are in the process of working on this. Dr. Karthik Harris is on vacation- reaching out to the on call provider.

## 2023-08-01 ENCOUNTER — TELEPHONE (OUTPATIENT)
Dept: CARDIOLOGY CLINIC | Facility: CLINIC | Age: 78
End: 2023-08-01

## 2023-08-01 DIAGNOSIS — I25.10 CORONARY ARTERY DISEASE INVOLVING NATIVE HEART WITHOUT ANGINA PECTORIS, UNSPECIFIED VESSEL OR LESION TYPE: ICD-10-CM

## 2023-08-01 RX ORDER — CLOPIDOGREL BISULFATE 75 MG/1
75 TABLET ORAL DAILY
Qty: 90 TABLET | Refills: 3 | Status: SHIPPED | OUTPATIENT
Start: 2023-08-01 | End: 2023-08-07 | Stop reason: SDUPTHER

## 2023-08-02 ENCOUNTER — OFFICE VISIT (OUTPATIENT)
Dept: FAMILY MEDICINE CLINIC | Facility: CLINIC | Age: 78
End: 2023-08-02
Payer: MEDICARE

## 2023-08-02 VITALS
DIASTOLIC BLOOD PRESSURE: 54 MMHG | WEIGHT: 175 LBS | BODY MASS INDEX: 25.05 KG/M2 | HEART RATE: 64 BPM | RESPIRATION RATE: 18 BRPM | SYSTOLIC BLOOD PRESSURE: 128 MMHG | HEIGHT: 70 IN | OXYGEN SATURATION: 98 % | TEMPERATURE: 97.4 F

## 2023-08-02 DIAGNOSIS — I10 PRIMARY HYPERTENSION: ICD-10-CM

## 2023-08-02 DIAGNOSIS — I77.9 AORTO-ILIAC DISEASE (HCC): Chronic | ICD-10-CM

## 2023-08-02 DIAGNOSIS — Z95.2 S/P TAVR (TRANSCATHETER AORTIC VALVE REPLACEMENT): ICD-10-CM

## 2023-08-02 DIAGNOSIS — N18.4 CONTROLLED TYPE 2 DIABETES MELLITUS WITH STAGE 4 CHRONIC KIDNEY DISEASE, WITHOUT LONG-TERM CURRENT USE OF INSULIN (HCC): ICD-10-CM

## 2023-08-02 DIAGNOSIS — I50.32 CHRONIC DIASTOLIC CHF (CONGESTIVE HEART FAILURE) (HCC): ICD-10-CM

## 2023-08-02 DIAGNOSIS — E78.2 MIXED HYPERLIPIDEMIA: ICD-10-CM

## 2023-08-02 DIAGNOSIS — E11.22 CONTROLLED TYPE 2 DIABETES MELLITUS WITH STAGE 4 CHRONIC KIDNEY DISEASE, WITHOUT LONG-TERM CURRENT USE OF INSULIN (HCC): ICD-10-CM

## 2023-08-02 DIAGNOSIS — K21.9 GASTROESOPHAGEAL REFLUX DISEASE WITHOUT ESOPHAGITIS: ICD-10-CM

## 2023-08-02 DIAGNOSIS — F17.219 CIGARETTE NICOTINE DEPENDENCE WITH NICOTINE-INDUCED DISORDER: ICD-10-CM

## 2023-08-02 DIAGNOSIS — Z00.00 WELL ADULT EXAM: Primary | ICD-10-CM

## 2023-08-02 DIAGNOSIS — I25.10 CORONARY ARTERY DISEASE INVOLVING NATIVE CORONARY ARTERY OF NATIVE HEART WITHOUT ANGINA PECTORIS: ICD-10-CM

## 2023-08-02 PROCEDURE — 99214 OFFICE O/P EST MOD 30 MIN: CPT | Performed by: FAMILY MEDICINE

## 2023-08-02 PROCEDURE — G0439 PPPS, SUBSEQ VISIT: HCPCS | Performed by: FAMILY MEDICINE

## 2023-08-02 RX ORDER — GLIMEPIRIDE 1 MG/1
1 TABLET ORAL
Qty: 90 TABLET | Refills: 1 | Status: SHIPPED | OUTPATIENT
Start: 2023-08-02 | End: 2024-01-29

## 2023-08-02 RX ORDER — METOPROLOL SUCCINATE 25 MG/1
12.5 TABLET, EXTENDED RELEASE ORAL DAILY
Qty: 30 TABLET | Refills: 1 | Status: SHIPPED | OUTPATIENT
Start: 2023-08-02 | End: 2023-08-07 | Stop reason: SDUPTHER

## 2023-08-02 NOTE — ASSESSMENT & PLAN NOTE
Wt Readings from Last 3 Encounters:   06/13/23 77.1 kg (170 lb)   06/08/23 76.7 kg (169 lb)   05/17/23 76.7 kg (169 lb)   Patient is stable  and will continue present plan of care and reassess at next routine visit. All questions about this problem from patient were answered today.

## 2023-08-02 NOTE — PROGRESS NOTES
Assessment and Plan:     Problem List Items Addressed This Visit        Digestive    GERD (gastroesophageal reflux disease)     Patient to continue with present therapy and decrease caffeine, avoid ETOH and smoking to decrease acid production. Pt should also cease eating prior to bedtime and avoid excessive fluid intake prior to sleep. May use antacids as needed for breakthrough GERD. All pateint questions answered today about this condition. Endocrine    Controlled type 2 diabetes mellitus with stage 4 chronic kidney disease, without long-term current use of insulin ([address])     Patient is stable with current meds and discussed a low carb diet. Pt  recommended to see eye doctor and foot doctor for routine screening as per protocol. Recheck A1C  and Cr in 3 months. Patient questions answered today about this condtion. Lab Results   Component Value Date    HGBA1C 6.3 (H) 07/07/2023            Relevant Medications    glimepiride (AMARYL) 1 mg tablet    Other Relevant Orders    Hemoglobin A1C    Comprehensive metabolic panel       Cardiovascular and Mediastinum    Aorto-iliac disease ([address]) (Chronic)     Patient is stable  and will continue present plan of care and reassess at next routine visit. All questions about this problem from patient were answered today. Hypertension     Patient is stable with current anti-hypertensive medicine and continue to follow a low sodium diet and take current medication. All questions about this condition were answered today. Relevant Medications    metoprolol succinate (TOPROL-XL) 25 mg 24 hr tablet    CAD (coronary artery disease)     Patient to continue  with current cardiac meds to decrease risk of re stenosis. Patient to follow up with cardiology for scheduled appointments to decrease risk for further cardiac problems with appropriate diagnostic testing to reassess cardiac status. Patient had alll questions about this problem answered today. Relevant Medications    metoprolol succinate (TOPROL-XL) 25 mg 24 hr tablet    Chronic diastolic CHF (congestive heart failure) (HCC)     Wt Readings from Last 3 Encounters:   06/13/23 77.1 kg (170 lb)   06/08/23 76.7 kg (169 lb)   05/17/23 76.7 kg (169 lb)   Patient is stable  and will continue present plan of care and reassess at next routine visit. All questions about this problem from patient were answered today. Relevant Medications    metoprolol succinate (TOPROL-XL) 25 mg 24 hr tablet       Nervous and Auditory    Cigarette nicotine dependence with nicotine-induced disorder     Patient encouraged to quit using tobacco for that increases their risk for COPD, lung cancer,stroke, oral cancer and heart disease. If patient does not want to quit they should let me know  when they are interested in quitting. There are numerous options to use to quit and we can discuss them. Other    Hyperlipidemia     Patient  is stable with current medication and we discussed a low fat low cholesterol diet. Weight loss also discussed for this will help lower cholesterol also. Recheck lipids in 6 months. S/P TAVR (transcatheter aortic valve replacement)    Relevant Medications    metoprolol succinate (TOPROL-XL) 25 mg 24 hr tablet   Other Visit Diagnoses     Well adult exam    -  Primary           Preventive health issues were discussed with patient, and age appropriate screening tests were ordered as noted in patient's After Visit Summary. Personalized health advice and appropriate referrals for health education or preventive services given if needed, as noted in patient's After Visit Summary.      History of Present Illness:     Patient presents for a Medicare Wellness Visit    HPI   Patient Care Team:  Vale Willard MD as PCP - General (Family Medicine)  MD Shana Royal MD Ardeen Maffucci, MD Jacqui Sawyer, Clayburn Nixon, MD Mevelyn Shoemaker, MD (Gastroenterology)     Review of Systems:     Review of Systems     Problem List:     Patient Active Problem List   Diagnosis   • Atherosclerosis of native arteries of extremities with intermittent claudication, bilateral legs ([facility])   • PVD (peripheral vascular disease) ([address])   • Stenosis of noncoronary bypass graft ([facility])   • Asymptomatic bilateral carotid artery stenosis   • S/P CABG (coronary artery bypass graft)   • Hypertension   • Aorto-iliac disease ([facility])   • Renal artery stenosis, native, bilateral ([facility])   • CAD (coronary artery disease)   • Progressive angina ([facility])   • Tension headache   • Cigarette nicotine dependence with nicotine-induced disorder   • Calcific tendinitis of right shoulder region   • Right shoulder pain   • Sinus bradycardia   • Controlled type 2 diabetes mellitus with stage 4 chronic kidney disease, without long-term current use of insulin ([facility])   • GERD (gastroesophageal reflux disease)   • Hyperlipidemia   • Persistent proteinuria, unspecified   • CKD (chronic kidney disease) stage 4, GFR 15-29 ml/min ([facility])   • Leukopenia   • Hypomagnesemia   • Renal cyst, left   • Acute kidney injury superimposed on chronic kidney disease ([facility])   • Iron deficiency anemia   • Chronic diastolic CHF (congestive heart failure) ([facility])   • Elevated serum creatinine   • S/P TAVR (transcatheter aortic valve replacement)   • Benign hypertension with chronic kidney disease, stage III ([facility])   • Secondary hyperparathyroidism of renal origin ([address])   • Anemia due to stage 4 chronic kidney disease ([facility])   • Hyperuricemia   • 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 ([facility])   • Dependence on respirator (ventilator) status ([address])   • Platelets decreased ([address])      Past Medical and Surgical History:     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 ([address]) 2/24/2023   • Basal cell carcinoma     right cheek   • CAD (coronary artery disease)    • Carotid stenosis, asymptomatic, bilateral    • Chronic kidney disease    • Colon polyp    • Diabetes ([address])     type 2, non-insulin dependent   • Diabetes mellitus (HCC)    • GERD (gastroesophageal reflux disease)    • History of nephrolithiasis    • Hyperlipidemia    • Hypertension    • Left foot pain 11/30/2022   • PAD (peripheral artery disease) (HCC)    • Severe aortic stenosis    • Squamous cell skin cancer 11/22/2022    left superior helix     Past Surgical History:   Procedure Laterality Date   • APPENDECTOMY     • CARDIAC CATHETERIZATION N/A 05/05/2022    Procedure: CARDIAC RHC/LHC; Surgeon: Jaz Fitzpatrick DO;  [facility]; Service: Cardiology   • CARDIAC CATHETERIZATION N/A 05/05/2022    Procedure: Cardiac Coronary Angiogram;  Surgeon: Jaz Fitzpatrick DO;  [facility]; Service: Cardiology   • CARDIAC CATHETERIZATION N/A 05/24/2022    Procedure: Cardiac pci;  Surgeon: Keely Ross MD;  [facility];   Service: Cardiology   • CARDIAC CATHETERIZATION N/A 06/14/2022    Procedure: CARDIAC TAVR;  Surgeon: Hector Ibrahim MD;  Location[facility];  Service: Cardiology   • COLECTOMY     • COLONOSCOPY  2013   • CORONARY ARTERY BYPASS GRAFT  2013    X 2   • FEMORAL ARTERY - POPLITEAL ARTERY BYPASS GRAFT     • HEMORRHOID SURGERY     • IR AORTAGRAM WITH RUN-OFF  11/19/2018   • IR AORTAGRAM WITH RUN-OFF  3/2/2023   • IR LOWER EXTREMITY ANGIOGRAM  3/23/2023   • MOHS SURGERY Left 01/11/2023    [facility] left superior helix-Dr Tovar   • MT SLCTV CATHJ 3RD+ ORD SLCTV ABDL PEL/LXTR [facility] Left 08/12/2016    Procedure: LEFT FEMORAL ARTERIOGRAM; BALLOON ANGIOPLASTY; SFA  AND FEMORAL AT VEIN GRAFT;  Surgeon: Zeny Ramirez MD;  Location: [facility];  Service: Vascular   • MT TEAEC W/WO PATCH GRAFT COMMON FEMORAL Right 3/23/2023    Procedure: Common femoral endarterectomy&antegrade intervention of SFA, popliteal artery w/ shockwave;  Surgeon: Yanelis Oconnor MD;  Location: [facility];  Service: Vascular   • RI TRANSCATHETER TRANSAPICAL REPLACEMT AORTIC VALVE N/A 2022    Procedure: REPLACEMENT AORTIC VALVE TRANSCATHETER (TAVR) TRANSAPICAL 29MM IRVIN KYLER S3 ULTRA VALVE(ACCESS ON LEFT) ELANA;  Surgeon: Charleen Brice DO;  Location: [facility];  Service: Cardiac Surgery   • SKIN CANCER EXCISION     • TONSILLECTOMY AND ADENOIDECTOMY        Family History:     Family History   Problem Relation Age of Onset   • Heart attack Father    • Other Sister         bypass and vlave replacement   • Stroke Paternal Uncle    • Arrhythmia Neg Hx    • Asthma Neg Hx    • Clotting disorder Neg Hx    • Fainting Neg Hx    • Anuerysm Neg Hx    • Hypertension Neg Hx         unsure    • Hyperlipidemia Neg Hx    • Heart failure Neg Hx       Social History:     Social History     Socioeconomic History   • Marital status:      Spouse name: None   • Number of children: None   • Years of education: None   • Highest education level: None   Occupational History   • None   Tobacco Use   • Smoking status: Some Days     Packs/day: 0.25     Years: 50.00     Total pack years: 12.50     Types: Cigarettes   • Smokeless tobacco: Never   Vaping Use   • Vaping Use: Never used   Substance and Sexual Activity   • Alcohol use: Not Currently     Comment: rare   • Drug use: No   • Sexual activity: None   Other Topics Concern   • None   Social History Narrative    · Most recent tobacco use screenin2018      · Do you currently or have you served in the [employer]:    Yes      · If Yes, What branch of service:   [employer]      · Occupation:   Dentists      · Exercise level:   Occasional        · Caffeine intake:   Heavy      · Marital status:         · Diet:   Regular  low fat     · Seat belts used routinely:   Yes      · Smoke alarm in home: Yes      · Advance directive: Yes      · General stress level:   Low      Social Determinants of Health     Financial Resource Strain: Low Risk  (8/2/2023)    Overall Financial Resource Strain (CARDIA)    • Difficulty of Paying Living Expenses: Not very hard   Food Insecurity: Unknown (3/24/2023)    Hunger Vital Sign    • Worried About Running Out of Food in the Last Year: Never true    • Ran Out of Food in the Last Year: Not on file   Transportation Needs: No Transportation Needs (8/2/2023)    PRAPARE - Transportation    • Lack of Transportation (Medical): No    • Lack of Transportation (Non-Medical):  No   Physical Activity: Not on file   Stress: Not on file   Social Connections: Not on file   Intimate Partner Violence: Not on file   Housing Stability: Low Risk  (3/24/2023)    Housing Stability Vital Sign    • Unable to Pay for Housing in the Last Year: No    • Number of Places Lived in the Last Year: 1    • Unstable Housing in the Last Year: No      Medications and Allergies:     Current Outpatient Medications   Medication Sig Dispense Refill   • allopurinol (ZYLOPRIM) 300 mg tablet Take 1 tablet (300 mg total) by mouth daily 90 tablet 3   • amLODIPine (NORVASC) 10 mg tablet TAKE 1 TABLET DAILY 90 tablet 3   • Ascorbic Acid (vitamin C) 100 MG tablet Take 250 mg by mouth daily     • aspirin (ECOTRIN LOW STRENGTH) 81 mg EC tablet Take 81 mg by mouth daily     • atorvastatin (LIPITOR) 80 mg tablet TAKE 1 TABLET DAILY AT     BEDTIME 90 tablet 3   • calcitriol (ROCALTROL) 0.25 mcg capsule Take 1 capsule (0.25 mcg total) by mouth 3 (three) times a week 36 capsule 4   • Cholecalciferol (Vitamin D3 Ultra Potency) 1.25 MG (80897 UT) TABS Take 1 tablet (50,000 Units total) by mouth once a week 16 tablet 1   • clopidogrel (PLAVIX) 75 mg tablet Take 1 tablet (75 mg total) by mouth daily 90 tablet 3   • glimepiride (AMARYL) 1 mg tablet Take 1 tablet (1 mg total) by mouth daily with breakfast 90 tablet 1   • metoprolol succinate (TOPROL-XL) 25 mg 24 hr tablet Take 0.5 tablets (12.5 mg total) by mouth daily 30 tablet 1   • Multiple Vitamin (multivitamin) tablet Take 1 tablet by mouth daily Takes when he can remember. • pancrelipase, Lip-Prot-Amyl, (CREON) 24,000 units Take 24,000 units of lipase by mouth 3 (three) times a day with meals 300 capsule 2   • pantoprazole (PROTONIX) 40 mg tablet TAKE 1 TABLET DAILY 90 tablet 1   • acetaminophen (TYLENOL) 325 mg tablet Take 3 tablets (975 mg total) by mouth every 8 (eight) hours (Patient not taking: Reported on 6/13/2023)  0   • Cyanocobalamin (VITAMIN B12 PO) Take by mouth once a week (Patient not taking: Reported on 6/13/2023)     • docusate sodium (COLACE) 100 mg capsule Take 1 capsule (100 mg total) by mouth 2 (two) times a day (Patient not taking: Reported on 6/13/2023)  0   • glipiZIDE (GLUCOTROL) 5 mg tablet Take 1 tablet (5 mg total) by mouth daily with breakfast (Patient taking differently: Take 5 mg by mouth daily with breakfast Taking 2.5 mg he cuts 1 tablet in half due to shakes.) 90 tablet 1   • lisinopril (ZESTRIL) 20 mg tablet Take 1 tablet (20 mg total) by mouth daily 90 tablet 3   • Magnesium Oxide (MAG-200 PO) Take by mouth once a week (Patient not taking: Reported on 6/13/2023)     • torsemide (DEMADEX) 20 mg tablet Take 1 tablet (20 mg total) by mouth 2 (two) times a day (Patient taking differently: Take 10 mg by mouth daily Takes 10 mg once a day.) 180 tablet 3     No current facility-administered medications for this visit.      No Known Allergies   Immunizations:     Immunization History   Administered Date(s) Administered   • COVID-19 PFIZER VACCINE 0.3 ML IM 03/03/2021, 03/24/2021, 09/30/2021   • COVID-19 Pfizer Vac BIVALENT Stuart-sucrose 12 Yr+ IM (BOOSTER ONLY) 09/29/2022   • H1N1 Inj 11/15/2020   • H1N1, All Formulations 11/15/2020   • INFLUENZA 10/04/2011, 10/19/2012, 10/16/2014, 10/13/2016, 10/06/2018, 11/11/2021, 10/19/2022   • Influenza Split High Dose Preservative Free IM 10/06/2018, 11/01/2019   • Pneumococcal Conjugate 13-Valent 12/04/2015   • Pneumococcal Polysaccharide PPV23 06/11/2019   • Tdap 10/15/2021      Health Maintenance:         Topic Date Due   • Hepatitis C Screening  Completed   • Lung Cancer Screening  Discontinued         Topic Date Due   • Hepatitis A Vaccine (1 of 2 - Risk 2-dose series) Never done   • Hepatitis B Vaccine (1 of 3 - Risk 3-dose series) Never done   • COVID-19 Vaccine (5 - Pfizer series) 01/29/2023   • Influenza Vaccine (1) 09/01/2023      Medicare Screening Tests and Risk Assessments:     Merary Garcia is here for his Subsequent Wellness visit. Last Medicare Wellness visit information reviewed, patient interviewed and updates made to the record today. Health Risk Assessment:   Patient rates overall health as fair. Patient feels that their physical health rating is slightly better. Patient is satisfied with their life. Eyesight was rated as same. Hearing was rated as same. Patient feels that their emotional and mental health rating is same. Patients states they are never, rarely angry. Patient states they are sometimes unusually tired/fatigued. Pain experienced in the last 7 days has been some. Patient's pain rating has been 3/10. Patient states that he has experienced no weight loss or gain in last 6 months. Depression Screening:   PHQ-2 Score: 0      Fall Risk Screening: In the past year, patient has experienced: no history of falling in past year      Home Safety:  Patient has trouble with stairs inside or outside of their home. Patient has working smoke alarms and has no working carbon monoxide detector. Home safety hazards include: none. Nutrition:   Current diet is Regular. Medications:   Patient is not currently taking any over-the-counter supplements. Patient is not able to manage medications.      Activities of Daily Living (ADLs)/Instrumental Activities of Daily Living (IADLs):   Walk and transfer into and out of bed and chair?: Yes  Dress and groom yourself?: Yes    Bathe or shower yourself?: Yes    Feed yourself? Yes  Do your laundry/housekeeping?: Yes  Manage your money, pay your bills and track your expenses?: Yes  Make your own meals?: Yes    Do your own shopping?: Yes    Previous Hospitalizations:   Any hospitalizations or ED visits within the last 12 months?: Yes    How many hospitalizations have you had in the last year?: 1-2    Advance Care Planning:   Living will: Yes    Advanced directive: Yes      Cognitive Screening:   Provider or family/friend/caregiver concerned regarding cognition?: No    PREVENTIVE SCREENINGS      Cardiovascular Screening:    General: Screening Not Indicated and History Lipid Disorder      Diabetes Screening:     General: Screening Not Indicated and History Diabetes      Prostate Cancer Screening:    General: Screening Not Indicated      Abdominal Aortic Aneurysm (AAA) Screening:    Risk factors include: tobacco use        Lung Cancer Screening:     General: Screening Not Indicated      Hepatitis C Screening:    General: Screening Current    Screening, Brief Intervention, and Referral to Treatment (SBIRT)    Screening  Typical number of drinks in a day: 0  Typical number of drinks in a week: 0  Interpretation: Low risk drinking behavior. No results found.      Physical Exam:     /54 (BP Location: Left arm, Patient Position: Standing, Cuff Size: Standard)   Pulse 64   Temp (!) 97.4 °F (36.3 °C)   Resp 18   Ht 5' 10" (1.778 m)   Wt 79.4 kg (175 lb)   SpO2 98%   BMI 25.11 kg/m²     Physical Exam     Gustavo Hartley MD

## 2023-08-02 NOTE — PROGRESS NOTES
Name: Shonda Montilla. : 1945      MRN: [id number]  Encounter Provider: Brandie Metzger MD  Encounter Date: 2023   Encounter department: [facility]     1. Well adult exam    2. Cigarette nicotine dependence with nicotine-induced disorder  Assessment & Plan:  Patient encouraged to quit using tobacco for that increases their risk for COPD, lung cancer,stroke, oral cancer and heart disease. If patient does not want to quit they should let me know  when they are interested in quitting. There are numerous options to use to quit and we can discuss them. 3. Controlled type 2 diabetes mellitus with stage 4 chronic kidney disease, without long-term current use of insulin ([address])  Assessment & Plan:  Patient is stable with current meds and discussed a low carb diet. Pt  recommended to see eye doctor and foot doctor for routine screening as per protocol. Recheck A1C  and Cr in 3 months. Patient questions answered today about this condtion. Lab Results   Component Value Date    HGBA1C 6.3 (H) 2023       Orders:  -     Hemoglobin A1C; Future; Expected date: 2023  -     Comprehensive metabolic panel; Future; Expected date: 2023  -     glimepiride (AMARYL) 1 mg tablet; Take 1 tablet (1 mg total) by mouth daily with breakfast    4. Gastroesophageal reflux disease without esophagitis  Assessment & Plan:  Patient to continue with present therapy and decrease caffeine, avoid ETOH and smoking to decrease acid production. Pt should also cease eating prior to bedtime and avoid excessive fluid intake prior to sleep. May use antacids as needed for breakthrough GERD. All pateint questions answered today about this condition. 5. Mixed hyperlipidemia  Assessment & Plan:  Patient  is stable with current medication and we discussed a low fat low cholesterol diet. Weight loss also discussed for this will help lower cholesterol also.  Recheck lipids in 6 months. 6. Chronic diastolic CHF (congestive heart failure) (HCC)  Assessment & Plan:  Wt Readings from Last 3 Encounters:   06/13/23 77.1 kg (170 lb)   06/08/23 76.7 kg (169 lb)   05/17/23 76.7 kg (169 lb)   Patient is stable  and will continue present plan of care and reassess at next routine visit. All questions about this problem from patient were answered today. 7. Coronary artery disease involving native coronary artery of native heart without angina pectoris  Assessment & Plan:  Patient to continue  with current cardiac meds to decrease risk of re stenosis. Patient to follow up with cardiology for scheduled appointments to decrease risk for further cardiac problems with appropriate diagnostic testing to reassess cardiac status. Patient had alll questions about this problem answered today. 8. Primary hypertension  Assessment & Plan:  Patient is stable with current anti-hypertensive medicine and continue to follow a low sodium diet and take current medication. All questions about this condition were answered today. 9. Aorto-iliac disease ([address])  Assessment & Plan:  Patient is stable  and will continue present plan of care and reassess at next routine visit. All questions about this problem from patient were answered today. 10. S/P TAVR (transcatheter aortic valve replacement)  -     metoprolol succinate (TOPROL-XL) 25 mg 24 hr tablet; Take 0.5 tablets (12.5 mg total) by mouth daily        Falls Plan of Care: balance, strength, and gait training instructions were provided. Home safety education provided. Subjective     Is a 38-year-old male here today for Medicare wellness and checkup on multiple medical problems. Patient with coronary disease history of TAVR peripheral vascular disease chronic kidney disease stage IIIb-IV.   Patient states that he has been having symptoms those of some hypoglycemia with his glipizide he has been taking the 5 mg dose that he has been breaking in half still having some episodes where he is having low sugar. At this point working to see about deseeding his glipizide altogether and were going to try the glim Epimide at just 1 mg to see if we can have a little less medicine to see if that is going to satisfy his glucose needs. Review of Systems   Constitutional: Negative for activity change, appetite change, chills, fatigue, fever and unexpected weight change. HENT: Negative for congestion, ear pain, hearing loss, mouth sores, postnasal drip, sinus pressure, sinus pain, sneezing and sore throat. Respiratory: Negative for apnea, cough, shortness of breath and wheezing. Cardiovascular: Negative for chest pain, palpitations and leg swelling. Gastrointestinal: Negative for abdominal pain, constipation, diarrhea, nausea and vomiting. Endocrine: Negative for cold intolerance and heat intolerance. Genitourinary: Negative for dysuria, frequency and hematuria. Musculoskeletal: Negative for arthralgias, back pain, gait problem, joint swelling and neck pain. Skin: Negative for rash. Neurological: Negative for dizziness, weakness and numbness. Hematological: Does not bruise/bleed easily. Psychiatric/Behavioral: Negative for agitation, behavioral problems, confusion, hallucinations and sleep disturbance. The patient is not nervous/anxious. Patient's shoes and socks removed. Right Foot/Ankle   Right Foot Inspection  Skin Exam: skin normal. Skin not intact, no dry skin, no callus, no erythema, no maceration, no abnormal color, no pre-ulcer, no ulcer and no callus. Toe Exam: ROM and strength within normal limits. No tenderness    Sensory   Vibration: intact  Proprioception: intact  Monofilament testing: intact    Vascular  Capillary refills: < 3 seconds  The right DP pulse is 2+. The right PT pulse is 2+. Left Foot/Ankle  Left Foot Inspection  Skin Exam: callus.  Skin not intact, no dry skin, no warmth, no erythema, no maceration, normal color, no pre-ulcer and no ulcer. Toe Exam: No swelling and no tenderness. Sensory   Vibration: intact  Proprioception: intact  Monofilament testing: intact    Vascular  Capillary refills: < 3 seconds  The left DP pulse is 2+. The left PT pulse is 2+. 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 ([address]) 2/24/2023   • Basal cell carcinoma     right cheek   • CAD (coronary artery disease)    • Carotid stenosis, asymptomatic, bilateral    • Chronic kidney disease    • Colon polyp    • Diabetes ([address])     type 2, non-insulin dependent   • Diabetes mellitus (HCC)    • GERD (gastroesophageal reflux disease)    • History of nephrolithiasis    • Hyperlipidemia    • Hypertension    • Left foot pain 11/30/2022   • PAD (peripheral artery disease) (HCC)    • Severe aortic stenosis    • Squamous cell skin cancer 11/22/2022    left superior helix     Past Surgical History:   Procedure Laterality Date   • APPENDECTOMY     • CARDIAC CATHETERIZATION N/A 05/05/2022    Procedure: CARDIAC RHC/LHC; Surgeon: Galen Schofield DO;  [facility]; Service: Cardiology   • CARDIAC CATHETERIZATION N/A 05/05/2022    Procedure: Cardiac Coronary Angiogram;  Surgeon: Galen Schofield DO;  [facility]; Service: Cardiology   • CARDIAC CATHETERIZATION N/A 05/24/2022    Procedure: Cardiac pci;  Surgeon: Epifanio Valdes MD;  [facility]: [facility];   Service: Cardiology   • CARDIAC CATHETERIZATION N/A 06/14/2022    Procedure: CARDIAC TAVR;  Surgeon: Miroslava Tamez MD;  Location: [facility];  Service: Cardiology   • COLECTOMY     • COLONOSCOPY  2013   • CORONARY ARTERY BYPASS GRAFT  2013    X 2   • FEMORAL ARTERY - POPLITEAL ARTERY BYPASS GRAFT     • HEMORRHOID SURGERY     • IR AORTAGRAM WITH RUN-OFF  11/19/2018   • IR AORTAGRAM WITH RUN-OFF  3/2/2023   • IR LOWER EXTREMITY ANGIOGRAM  3/23/2023   • MOHS SURGERY Left 01/11/2023    SCC left superior helix-Dr Tovar   • IA SLCTV CATHJ 3RD+ ORD SLCTV ABDL PEL/LXTR [facility] Left 08/12/2016    Procedure: LEFT FEMORAL ARTERIOGRAM; BALLOON ANGIOPLASTY; SFA  AND FEMORAL AT VEIN GRAFT;  Surgeon: Mary Galaviz MD;  Location: [facility] MAIN [facility];  Service: Vascular   • IA TEAEC W/WO PATCH GRAFT COMMON FEMORAL Right 3/23/2023    Procedure: Common femoral endarterectomy&antegrade intervention of SFA, popliteal artery w/ shockwave;  Surgeon: Marline Shook MD;  Location: [facility];  Service: Vascular   • IA TRANSCATHETER TRANSAPICAL REPLACEMT AORTIC VALVE N/A 06/14/2022    Procedure: REPLACEMENT AORTIC VALVE TRANSCATHETER (TAVR) TRANSAPICAL 29MM IRVIN KYLER S3 ULTRA VALVE(ACCESS ON LEFT) ELANA;  Surgeon: Caroline Shane DO;  Location: [facility];  Service: Cardiac Surgery   • SKIN CANCER EXCISION     • TONSILLECTOMY AND ADENOIDECTOMY       Family History   Problem Relation Age of Onset   • Heart attack Father    • Other Sister         bypass and vlave replacement   • Stroke Paternal Uncle    • Arrhythmia Neg Hx    • Asthma Neg Hx    • Clotting disorder Neg Hx    • Fainting Neg Hx    • Anuerysm Neg Hx    • Hypertension Neg Hx         unsure    • Hyperlipidemia Neg Hx    • Heart failure Neg Hx      Social History     Socioeconomic History   • Marital status:      Spouse name: None   • Number of children: None   • Years of education: None   • Highest education level: None   Occupational History   • None   Tobacco Use   • Smoking status: Some Days     Packs/day: 0.25     Years: 50.00     Total pack years: 12.50     Types: Cigarettes   • Smokeless tobacco: Never   Vaping Use   • Vaping Use: Never used   Substance and Sexual Activity   • Alcohol use: Not Currently Comment: rare   • Drug use: No   • Sexual activity: None   Other Topics Concern   • None   Social History Narrative    · Most recent tobacco use screenin2018      · Do you currently or have you served in the [address] [place] [address] [employer]: Yes      · If Yes, What branch of service:   [employer]      · Occupation:   Dentists      · Exercise level:   Occasional        · Caffeine intake:   Heavy      · Marital status:         · Diet:   Regular  low fat     · Seat belts used routinely:   Yes      · Smoke alarm in home: Yes      · Advance directive: Yes      · General stress level:   Low      Social Determinants of Health     Financial Resource Strain: Low Risk  (2023)    Overall Financial Resource Strain (CARDIA)    • Difficulty of Paying Living Expenses: Not very hard   Food Insecurity: Unknown (3/24/2023)    Hunger Vital Sign    • Worried About Running Out of Food in the Last Year: Never true    • Ran Out of Food in the Last Year: Not on file   Transportation Needs: No Transportation Needs (2023)    PRAPARE - Transportation    • Lack of Transportation (Medical): No    • Lack of Transportation (Non-Medical):  No   Physical Activity: Not on file   Stress: Not on file   Social Connections: Not on file   Intimate Partner Violence: Not on file   Housing Stability: Low Risk  (3/24/2023)    Housing Stability Vital Sign    • Unable to Pay for Housing in the Last Year: No    • Number of Places Lived in the Last Year: 1    • Unstable Housing in the Last Year: No     Current Outpatient Medications on File Prior to Visit   Medication Sig   • allopurinol (ZYLOPRIM) 300 mg tablet Take 1 tablet (300 mg total) by mouth daily   • amLODIPine (NORVASC) 10 mg tablet TAKE 1 TABLET DAILY   • Ascorbic Acid (vitamin C) 100 MG tablet Take 250 mg by mouth daily   • aspirin (ECOTRIN LOW STRENGTH) 81 mg EC tablet Take 81 mg by mouth daily   • atorvastatin (LIPITOR) 80 mg tablet TAKE 1 TABLET DAILY AT     BEDTIME   • calcitriol (ROCALTROL) 0.25 mcg capsule Take 1 capsule (0.25 mcg total) by mouth 3 (three) times a week   • Cholecalciferol (Vitamin D3 Ultra Potency) 1.25 MG (44380 UT) TABS Take 1 tablet (50,000 Units total) by mouth once a week   • clopidogrel (PLAVIX) 75 mg tablet Take 1 tablet (75 mg total) by mouth daily   • Multiple Vitamin (multivitamin) tablet Take 1 tablet by mouth daily Takes when he can remember.    • pancrelipase, Lip-Prot-Amyl, (CREON) 24,000 units Take 24,000 units of lipase by mouth 3 (three) times a day with meals   • pantoprazole (PROTONIX) 40 mg tablet TAKE 1 TABLET DAILY   • [DISCONTINUED] metoprolol succinate (TOPROL-XL) 25 mg 24 hr tablet Take 0.5 tablets (12.5 mg total) by mouth daily   • acetaminophen (TYLENOL) 325 mg tablet Take 3 tablets (975 mg total) by mouth every 8 (eight) hours (Patient not taking: Reported on 6/13/2023)   • Cyanocobalamin (VITAMIN B12 PO) Take by mouth once a week (Patient not taking: Reported on 6/13/2023)   • docusate sodium (COLACE) 100 mg capsule Take 1 capsule (100 mg total) by mouth 2 (two) times a day (Patient not taking: Reported on 6/13/2023)   • glipiZIDE (GLUCOTROL) 5 mg tablet Take 1 tablet (5 mg total) by mouth daily with breakfast (Patient taking differently: Take 5 mg by mouth daily with breakfast Taking 2.5 mg he cuts 1 tablet in half due to shakes.)   • lisinopril (ZESTRIL) 20 mg tablet Take 1 tablet (20 mg total) by mouth daily   • Magnesium Oxide (MAG-200 PO) Take by mouth once a week (Patient not taking: Reported on 6/13/2023)   • torsemide (DEMADEX) 20 mg tablet Take 1 tablet (20 mg total) by mouth 2 (two) times a day (Patient taking differently: Take 10 mg by mouth daily Takes 10 mg once a day.)     No Known Allergies  Immunization History   Administered Date(s) Administered   • COVID-19 PFIZER VACCINE 0.3 ML IM 03/03/2021, 03/24/2021, 09/30/2021   • COVID-19 Pfizer Vac BIVALENT Stuart-sucrose 12 Yr+ IM (BOOSTER ONLY) 09/29/2022   • H1N1 Inj 11/15/2020   • H1N1, All Formulations 11/15/2020   • INFLUENZA 10/04/2011, 10/19/2012, 10/16/2014, 10/13/2016, 10/06/2018, 11/11/2021, 10/19/2022   • Influenza Split High Dose Preservative Free IM 10/06/2018, 11/01/2019   • Pneumococcal Conjugate 13-Valent 12/04/2015   • Pneumococcal Polysaccharide PPV23 06/11/2019   • Tdap 10/15/2021       Objective     /54 (BP Location: Left arm, Patient Position: Standing, Cuff Size: Standard)   Pulse 64   Temp (!) 97.4 °F (36.3 °C)   Resp 18   Ht 5' 10" (1.778 m)   Wt 79.4 kg (175 lb)   SpO2 98%   BMI 25.11 kg/m²     Physical Exam  Vitals and nursing note reviewed. Constitutional:       Appearance: He is well-developed. HENT:      Head: Normocephalic and atraumatic. Nose: Nose normal.      Mouth/Throat:      Mouth: Mucous membranes are moist.   Eyes:      General: No scleral icterus. Conjunctiva/sclera: Conjunctivae normal.      Pupils: Pupils are equal, round, and reactive to light. Neck:      Thyroid: No thyromegaly. Cardiovascular:      Rate and Rhythm: Normal rate and regular rhythm. Pulses: Pulses are weak. Dorsalis pedis pulses are 2+ on the right side and 2+ on the left side. Posterior tibial pulses are 2+ on the right side and 2+ on the left side. Heart sounds: Normal heart sounds. Pulmonary:      Effort: Pulmonary effort is normal. No respiratory distress. Breath sounds: Normal breath sounds. No wheezing. Abdominal:      General: Bowel sounds are normal.      Palpations: Abdomen is soft. Tenderness: There is no abdominal tenderness. There is no guarding or rebound. Musculoskeletal:         General: Normal range of motion. Cervical back: Normal range of motion and neck supple. Feet:      Right foot:      Skin integrity: No ulcer, skin breakdown, erythema, callus or dry skin. Left foot:      Skin integrity: Callus present. No ulcer, skin breakdown, erythema, warmth or dry skin.    Skin:     General: Skin is warm and dry. Findings: No rash. Neurological:      Mental Status: He is alert and oriented to person, place, and time. Psychiatric:         Mood and Affect: Mood normal.         Behavior: Behavior normal.         Thought Content:  Thought content normal.         Judgment: Judgment normal.       Brandie Metzger MD

## 2023-08-02 NOTE — ASSESSMENT & PLAN NOTE
Patient is stable with current meds and discussed a low carb diet. Pt  recommended to see eye doctor and foot doctor for routine screening as per protocol. Recheck A1C  and Cr in 3 months. Patient questions answered today about this condtion.   Lab Results   Component Value Date    HGBA1C 6.3 (H) 07/07/2023

## 2023-08-02 NOTE — ASSESSMENT & PLAN NOTE
Patient to continue  with current cardiac meds to decrease risk of re stenosis. Patient to follow up with cardiology for scheduled appointments to decrease risk for further cardiac problems with appropriate diagnostic testing to reassess cardiac status. Patient had alll questions about this problem answered today.

## 2023-08-02 NOTE — ASSESSMENT & PLAN NOTE
Patient encouraged to quit using tobacco for that increases their risk for COPD, lung cancer,stroke, oral cancer and heart disease. If patient does not want to quit they should let me know  when they are interested in quitting. There are numerous options to use to quit and we can discuss them.

## 2023-08-07 ENCOUNTER — TELEPHONE (OUTPATIENT)
Dept: CARDIOLOGY CLINIC | Facility: CLINIC | Age: 78
End: 2023-08-07

## 2023-08-07 DIAGNOSIS — I25.10 CORONARY ARTERY DISEASE INVOLVING NATIVE HEART WITHOUT ANGINA PECTORIS, UNSPECIFIED VESSEL OR LESION TYPE: ICD-10-CM

## 2023-08-07 DIAGNOSIS — Z95.2 S/P TAVR (TRANSCATHETER AORTIC VALVE REPLACEMENT): ICD-10-CM

## 2023-08-07 RX ORDER — CLOPIDOGREL BISULFATE 75 MG/1
75 TABLET ORAL DAILY
Qty: 90 TABLET | Refills: 3 | Status: SHIPPED | OUTPATIENT
Start: 2023-08-07 | End: 2023-08-15 | Stop reason: SDUPTHER

## 2023-08-07 RX ORDER — METOPROLOL SUCCINATE 25 MG/1
12.5 TABLET, EXTENDED RELEASE ORAL DAILY
Qty: 180 TABLET | Refills: 0 | Status: SHIPPED | OUTPATIENT
Start: 2023-08-07 | End: 2023-08-08

## 2023-08-07 NOTE — TELEPHONE ENCOUNTER
Doctor Casper Orozco March 19th, 1945. I'm looking for [employer]. She left me a message to call [phone]. I had stepped out for a while. I'm back. I don't know who where [facility] is, but could you please have her call me back, please? Thank you.

## 2023-08-07 NOTE — TELEPHONE ENCOUNTER
Patient is requesting a call back regarding his prescriptions, states he is having problems getting them

## 2023-08-08 DIAGNOSIS — Z95.2 S/P TAVR (TRANSCATHETER AORTIC VALVE REPLACEMENT): ICD-10-CM

## 2023-08-08 RX ORDER — METOPROLOL SUCCINATE 25 MG/1
12.5 TABLET, EXTENDED RELEASE ORAL DAILY
Qty: 180 TABLET | Refills: 2 | Status: SHIPPED | OUTPATIENT
Start: 2023-08-08 | End: 2023-08-15 | Stop reason: SDUPTHER

## 2023-08-08 NOTE — TELEPHONE ENCOUNTER
We have sent in refills multiple times to [facility]- Pt will call mailorder to follow up

## 2023-08-15 ENCOUNTER — TELEPHONE (OUTPATIENT)
Dept: CARDIOLOGY CLINIC | Facility: CLINIC | Age: 78
End: 2023-08-15

## 2023-08-15 DIAGNOSIS — I25.10 CORONARY ARTERY DISEASE INVOLVING NATIVE HEART WITHOUT ANGINA PECTORIS, UNSPECIFIED VESSEL OR LESION TYPE: ICD-10-CM

## 2023-08-15 DIAGNOSIS — Z95.2 S/P TAVR (TRANSCATHETER AORTIC VALVE REPLACEMENT): ICD-10-CM

## 2023-08-15 RX ORDER — METOPROLOL SUCCINATE 25 MG/1
12.5 TABLET, EXTENDED RELEASE ORAL DAILY
Qty: 90 TABLET | Refills: 1 | Status: SHIPPED | OUTPATIENT
Start: 2023-08-15

## 2023-08-15 RX ORDER — CLOPIDOGREL BISULFATE 75 MG/1
75 TABLET ORAL DAILY
Qty: 90 TABLET | Refills: 3 | Status: SHIPPED | OUTPATIENT
Start: 2023-08-15

## 2023-08-15 NOTE — TELEPHONE ENCOUNTER
Spoke with pharmacist at [facility].  They will refill both Metoprolol and Plavix for #90 day supply

## 2023-08-15 NOTE — TELEPHONE ENCOUNTER
Spoke with  [facility] to straighten out  metoprolol and plavix.

## 2023-08-17 ENCOUNTER — TELEPHONE (OUTPATIENT)
Dept: CARDIOLOGY CLINIC | Facility: CLINIC | Age: 78
End: 2023-08-17

## 2023-08-17 NOTE — TELEPHONE ENCOUNTER
Doctor Mitchel Amin calling March 19th, 1945 calling for [facility]. I just called [facility] mail order. They received the prescription for metoprolol. That's not going to be processed till September the 18th. They have nothing on record there of Plavix 75 mg tablets because I had gotten an emergency prescription of 30 tablets  on July 31st. So I don't know what's going on if why they don't have a prescription for the clopidogrel. I know you said you called in there a day or two ago and they didn't receive it. All I have is a phone number. I don't have a fax number [phone]. I don't know how you do that, This electronically fax. I don't have any fax number. Could you call me back and see if you talk and talk to them again and see why you this? If you sent it in, they don't have it yet. They don't have it. You said you called it in a couple of days ago. I waited. They said 24 hours. I waited over 24 hours and there's no prescription there yet. Thank you, [phone]. You should have my number memorized by now.

## 2023-08-17 NOTE — TELEPHONE ENCOUNTER
Spoke with pt- Rx's were sent on 08/15 confirmed by [facility] at 11:34am.  He will reach out to them again and see what the hold up is. Pt states he has two wks left of his medication.

## 2023-08-31 ENCOUNTER — LAB (OUTPATIENT)
Dept: LAB | Facility: MEDICAL CENTER | Age: 78
End: 2023-08-31
Payer: MEDICARE

## 2023-08-31 DIAGNOSIS — Z95.2 S/P TAVR (TRANSCATHETER AORTIC VALVE REPLACEMENT): ICD-10-CM

## 2023-08-31 DIAGNOSIS — N18.4 CONTROLLED TYPE 2 DIABETES MELLITUS WITH STAGE 4 CHRONIC KIDNEY DISEASE, WITHOUT LONG-TERM CURRENT USE OF INSULIN (HCC): ICD-10-CM

## 2023-08-31 DIAGNOSIS — D50.9 IRON DEFICIENCY ANEMIA, UNSPECIFIED IRON DEFICIENCY ANEMIA TYPE: ICD-10-CM

## 2023-08-31 DIAGNOSIS — I35.0 AORTIC STENOSIS, SEVERE: ICD-10-CM

## 2023-08-31 DIAGNOSIS — R80.1 PERSISTENT PROTEINURIA, UNSPECIFIED: ICD-10-CM

## 2023-08-31 DIAGNOSIS — N18.4 BENIGN HYPERTENSION WITH CHRONIC KIDNEY DISEASE, STAGE IV (HCC): ICD-10-CM

## 2023-08-31 DIAGNOSIS — E11.22 CONTROLLED TYPE 2 DIABETES MELLITUS WITH STAGE 4 CHRONIC KIDNEY DISEASE, WITHOUT LONG-TERM CURRENT USE OF INSULIN (HCC): ICD-10-CM

## 2023-08-31 DIAGNOSIS — N25.81 SECONDARY HYPERPARATHYROIDISM OF RENAL ORIGIN (HCC): ICD-10-CM

## 2023-08-31 DIAGNOSIS — I50.32 CHRONIC DIASTOLIC CHF (CONGESTIVE HEART FAILURE) (HCC): ICD-10-CM

## 2023-08-31 DIAGNOSIS — I12.9 BENIGN HYPERTENSION WITH CHRONIC KIDNEY DISEASE, STAGE IV (HCC): ICD-10-CM

## 2023-08-31 DIAGNOSIS — N18.4 CKD (CHRONIC KIDNEY DISEASE) STAGE 4, GFR 15-29 ML/MIN (HCC): ICD-10-CM

## 2023-08-31 LAB
ALBUMIN SERPL BCP-MCNC: 4.1 G/DL (ref 3.5–5)
ALP SERPL-CCNC: 84 U/L (ref 34–104)
ALT SERPL W P-5'-P-CCNC: 9 U/L (ref 7–52)
ANION GAP SERPL CALCULATED.3IONS-SCNC: 11 MMOL/L
AST SERPL W P-5'-P-CCNC: 19 U/L (ref 13–39)
BACTERIA UR QL AUTO: NORMAL /HPF
BASOPHILS # BLD AUTO: 0.03 THOUSANDS/ÂΜL (ref 0–0.1)
BASOPHILS NFR BLD AUTO: 1 % (ref 0–1)
BILIRUB SERPL-MCNC: 0.38 MG/DL (ref 0.2–1)
BILIRUB UR QL STRIP: NEGATIVE
BUN SERPL-MCNC: 42 MG/DL (ref 5–25)
CALCIUM SERPL-MCNC: 9.3 MG/DL (ref 8.4–10.2)
CHLORIDE SERPL-SCNC: 103 MMOL/L (ref 96–108)
CLARITY UR: CLEAR
CO2 SERPL-SCNC: 25 MMOL/L (ref 21–32)
COLOR UR: COLORLESS
CREAT SERPL-MCNC: 2.26 MG/DL (ref 0.6–1.3)
CREAT UR-MCNC: 26.3 MG/DL
EOSINOPHIL # BLD AUTO: 0.3 THOUSAND/ÂΜL (ref 0–0.61)
EOSINOPHIL NFR BLD AUTO: 5 % (ref 0–6)
ERYTHROCYTE [DISTWIDTH] IN BLOOD BY AUTOMATED COUNT: 15.2 % (ref 11.6–15.1)
EST. AVERAGE GLUCOSE BLD GHB EST-MCNC: 146 MG/DL
GFR SERPL CREATININE-BSD FRML MDRD: 26 ML/MIN/1.73SQ M
GLUCOSE SERPL-MCNC: 52 MG/DL (ref 65–140)
GLUCOSE UR STRIP-MCNC: NEGATIVE MG/DL
HBA1C MFR BLD: 6.7 %
HCT VFR BLD AUTO: 36.9 % (ref 36.5–49.3)
HGB BLD-MCNC: 11.8 G/DL (ref 12–17)
HGB UR QL STRIP.AUTO: NEGATIVE
IMM GRANULOCYTES # BLD AUTO: 0.02 THOUSAND/UL (ref 0–0.2)
IMM GRANULOCYTES NFR BLD AUTO: 0 % (ref 0–2)
KETONES UR STRIP-MCNC: NEGATIVE MG/DL
LEUKOCYTE ESTERASE UR QL STRIP: NEGATIVE
LYMPHOCYTES # BLD AUTO: 1.29 THOUSANDS/ÂΜL (ref 0.6–4.47)
LYMPHOCYTES NFR BLD AUTO: 21 % (ref 14–44)
MAGNESIUM SERPL-MCNC: 2.2 MG/DL (ref 1.9–2.7)
MCH RBC QN AUTO: 30.3 PG (ref 26.8–34.3)
MCHC RBC AUTO-ENTMCNC: 32 G/DL (ref 31.4–37.4)
MCV RBC AUTO: 95 FL (ref 82–98)
MONOCYTES # BLD AUTO: 0.61 THOUSAND/ÂΜL (ref 0.17–1.22)
MONOCYTES NFR BLD AUTO: 10 % (ref 4–12)
NEUTROPHILS # BLD AUTO: 3.77 THOUSANDS/ÂΜL (ref 1.85–7.62)
NEUTS SEG NFR BLD AUTO: 63 % (ref 43–75)
NITRITE UR QL STRIP: NEGATIVE
NON-SQ EPI CELLS URNS QL MICRO: NORMAL /HPF
NRBC BLD AUTO-RTO: 0 /100 WBCS
PH UR STRIP.AUTO: 6 [PH]
PHOSPHATE SERPL-MCNC: 3.7 MG/DL (ref 2.3–4.1)
PLATELET # BLD AUTO: 183 THOUSANDS/UL (ref 149–390)
PMV BLD AUTO: 11.5 FL (ref 8.9–12.7)
POTASSIUM SERPL-SCNC: 4.2 MMOL/L (ref 3.5–5.3)
PROT SERPL-MCNC: 7.3 G/DL (ref 6.4–8.4)
PROT UR STRIP-MCNC: NEGATIVE MG/DL
PROT UR-MCNC: 11 MG/DL
PROT/CREAT UR: 0.42 MG/G{CREAT} (ref 0–0.1)
PTH-INTACT SERPL-MCNC: 169.9 PG/ML (ref 12–88)
RBC # BLD AUTO: 3.89 MILLION/UL (ref 3.88–5.62)
RBC #/AREA URNS AUTO: NORMAL /HPF
SODIUM SERPL-SCNC: 139 MMOL/L (ref 135–147)
SP GR UR STRIP.AUTO: 1.01 (ref 1–1.03)
UROBILINOGEN UR STRIP-ACNC: <2 MG/DL
WBC # BLD AUTO: 6.02 THOUSAND/UL (ref 4.31–10.16)
WBC #/AREA URNS AUTO: NORMAL /HPF

## 2023-08-31 PROCEDURE — 85025 COMPLETE CBC W/AUTO DIFF WBC: CPT

## 2023-08-31 PROCEDURE — 36415 COLL VENOUS BLD VENIPUNCTURE: CPT

## 2023-08-31 PROCEDURE — 82570 ASSAY OF URINE CREATININE: CPT

## 2023-08-31 PROCEDURE — 83036 HEMOGLOBIN GLYCOSYLATED A1C: CPT

## 2023-08-31 PROCEDURE — 83970 ASSAY OF PARATHORMONE: CPT

## 2023-08-31 PROCEDURE — 84100 ASSAY OF PHOSPHORUS: CPT

## 2023-08-31 PROCEDURE — 81001 URINALYSIS AUTO W/SCOPE: CPT

## 2023-08-31 PROCEDURE — 80053 COMPREHEN METABOLIC PANEL: CPT

## 2023-08-31 PROCEDURE — 84156 ASSAY OF PROTEIN URINE: CPT

## 2023-08-31 PROCEDURE — 83735 ASSAY OF MAGNESIUM: CPT

## 2023-09-01 NOTE — RESULT ENCOUNTER NOTE
Renal function is stable at creatinine 2.2, PTH slightly trending up and urine proteinuria slightly worsening, will discuss during office visit on 9/5

## 2023-09-05 ENCOUNTER — OFFICE VISIT (OUTPATIENT)
Dept: NEPHROLOGY | Facility: CLINIC | Age: 78
End: 2023-09-05
Payer: MEDICARE

## 2023-09-05 VITALS
HEIGHT: 70 IN | HEART RATE: 55 BPM | DIASTOLIC BLOOD PRESSURE: 50 MMHG | SYSTOLIC BLOOD PRESSURE: 130 MMHG | WEIGHT: 173 LBS | BODY MASS INDEX: 24.77 KG/M2

## 2023-09-05 DIAGNOSIS — N18.4 BENIGN HYPERTENSION WITH CHRONIC KIDNEY DISEASE, STAGE IV (HCC): ICD-10-CM

## 2023-09-05 DIAGNOSIS — I50.32 CHRONIC DIASTOLIC CHF (CONGESTIVE HEART FAILURE) (HCC): ICD-10-CM

## 2023-09-05 DIAGNOSIS — E55.9 VITAMIN D DEFICIENCY: ICD-10-CM

## 2023-09-05 DIAGNOSIS — E11.22 CONTROLLED TYPE 2 DIABETES MELLITUS WITH STAGE 4 CHRONIC KIDNEY DISEASE, WITHOUT LONG-TERM CURRENT USE OF INSULIN (HCC): ICD-10-CM

## 2023-09-05 DIAGNOSIS — E79.0 HYPERURICEMIA: ICD-10-CM

## 2023-09-05 DIAGNOSIS — D50.9 IRON DEFICIENCY ANEMIA, UNSPECIFIED IRON DEFICIENCY ANEMIA TYPE: ICD-10-CM

## 2023-09-05 DIAGNOSIS — N18.4 CONTROLLED TYPE 2 DIABETES MELLITUS WITH STAGE 4 CHRONIC KIDNEY DISEASE, WITHOUT LONG-TERM CURRENT USE OF INSULIN (HCC): ICD-10-CM

## 2023-09-05 DIAGNOSIS — N18.4 CKD (CHRONIC KIDNEY DISEASE) STAGE 4, GFR 15-29 ML/MIN (HCC): Primary | ICD-10-CM

## 2023-09-05 DIAGNOSIS — N25.81 SECONDARY HYPERPARATHYROIDISM OF RENAL ORIGIN (HCC): ICD-10-CM

## 2023-09-05 DIAGNOSIS — R80.1 PERSISTENT PROTEINURIA, UNSPECIFIED: ICD-10-CM

## 2023-09-05 DIAGNOSIS — I12.9 BENIGN HYPERTENSION WITH CHRONIC KIDNEY DISEASE, STAGE IV (HCC): ICD-10-CM

## 2023-09-05 PROCEDURE — 99214 OFFICE O/P EST MOD 30 MIN: CPT | Performed by: INTERNAL MEDICINE

## 2023-09-05 RX ORDER — CALCITRIOL 0.25 UG/1
0.25 CAPSULE, LIQUID FILLED ORAL DAILY
Qty: 90 CAPSULE | Refills: 4 | Status: SHIPPED | OUTPATIENT
Start: 2023-09-05 | End: 2023-09-05

## 2023-09-05 RX ORDER — CALCITRIOL 0.25 UG/1
0.25 CAPSULE, LIQUID FILLED ORAL DAILY
Qty: 90 CAPSULE | Refills: 4 | Status: SHIPPED | OUTPATIENT
Start: 2023-09-05

## 2023-09-05 NOTE — PROGRESS NOTES
NEPHROLOGY OUTPATIENT PROGRESS NOTE   Gaston Estrada. 78 y.o. male MRN: [id number]  DATE: 9/5/2023  Reason for visit:   Chief Complaint   Patient presents with   • Follow-up   • Chronic Kidney Disease       ASSESSMENT and PLAN:  Chronic Kidney Disease Stage 4  -Baseline Creatinine: Recently since 10/2022 has been around 2.0-2.2 and renal function was at baseline in March 2023 but worsened on last blood work from May 2023 to creatinine 2.3-2.4. Most likely new baseline creatinine has been around 2.3-2.4, last blood work from August 31 with creatinine 2.26 mg/dL indicating stable renal function. 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 Was checked for concern for cholesterol embolization as patient underwent angiogram in March 2023  -Plan:   • Renal function stable last creatinine 2.26 mg/dL, this is likely the new baseline, continue to monitor  • Continue to avoid NSAIDs and IV contrast, continue oral hydration. Continue current dose of torsemide  • CKD Education: not interested.  He mentions has been watching [id number] videos. Still not interested   • Recommend avoiding metformin as GFR less than 30  • Discussed about FArxiga , Started on 10 mg daily to slow ckd progression. Patient will check about the cost and decide if he is going to start the medicine, BMP in a month, stressed on oral hydration and if too expensive may not be able to afford it  • Has record of living will in the system  • Follow up in 4 months        Primary Hypertension with chronic kidney disease stage 4:   -Current medication:  Amlodipine 10 mg daily, lisinopril 20 mg, Metoprolol 12.5 mg daily, Torsemide 10 mg   -Current blood pressure:  stable and at goal   -Plan:    ?  Continue same medications   -Recommend 2 g sodium diet.    -Recommend daily exercise and weight loss  -Discussed home monitoring of BP and maintaining a log of blood pressure.  -Recommend goal BP less than 130/80.      Chronic diastolic CHF/severe aortic stenosis status post TAVR  -Echo from 6/2022- was EF 60 %. Diastolic dysfunction  -taking torsemide 10 mg daily .  Continue same medication as clinically euvolemic.  -Recommend fluid restriction 1.8 L/min  - recommend taking extra torsemide in future if any weight gain more than 3 lb in a day or 5 lb in a week and to follow low-sodium diet     Proteinuria, persistent  -UPC ratio stable at [facility] ratio  0.42, ua bland   -likely due to hypertensive nephrosclerosis  -continue lisinopril 20 mg daily  -started on farxiga  -last A1c 6.7 at goal      Hypomagnesemia  -last Mag level wnl- taking multivitamin  -likely due to use of PPI     Hyperphosphatemia- level improved to 3.7 at normal   -recommend low phosphorus diet.  -list provided previously      Secondary hyperparathyroidism of renal origin/ Vitamin D deficiency  -PTH trending up to 169.9.  -on calcitriol to 0.25 mcg 3 times/week but due to elevated pth , increase dose to daily.   -vitamin D 17.0 -> Continue on vitamin D 24554 units weekly      Anemia due to CKD stage 4 and due to iron deficiency  -hb 11.8 g/day , iron sat 17 % in may   -on oral iron tablets 325 mg ferrous sulfate daily , OTC      B/L nephrolithiasis/left renal cyst  -currently asymptomatic and patient not aware  -reviewed CT abdomen report from April 2022, finding of nonobstructing bilateral nephrolithiasis.  Largest 8 mm inferior pole of left kidney.   -Pt asymptomatic.  - no monitoring needed for renal cyst as it was a simple parapelvic cyst. Not mentioned about  Renal cyst.      Hyperlipidemia  -on Lipitor  - recommend goal LDL less than 100  -last lipid panel was at goal , LDL 40      DM-2 controlled, with chronic kidney disease stage 4  -on amaryl  -last A1c was 6.7   -continue management per PCP  -diet and exercise stressed      Smoking/tobacco use, still smoking. Went back to it after quitting for 3 months      Hyperuricemia  -on allopurinol - 300 mg daily   -uric acid improved to 4.5   -low purine diet .     Patient Instructions   -Renal Function is stable   -You have Chronic Kidney Disease Stage 4  -Avoid NSAIDs like Ibuprofen/Motrin, Naproxen/Aleve, Celebrex, meloxicam/Mobic, Diclofenac and other NSAIDs.  -Okay to take Acetaminophen/Tylenol if you do not have any liver problems  -Avoid IV contrast used for CT scan and cardiac catheterization.    -If plan for any study with IV contrast, please let me know so we could hydrate with fluids before and after IV contrast  -Dosage  of certain medications may need to be adjusted depending on Kidney function. Blood pressure is stable    -Recommend 2 g sodium diet. -Recommend daily exercise and weight loss  -Discussed home monitoring of BP and maintaining a log of blood pressure.  -Recommend goal BP less than 130/80. Please inform me if SBP below 110 or above 140's persistently. Started on [place]. Increase calcitriol to once daily. Check blood work in a month. Increase oral hydration    Follow up: 4 months with repeat Lab work within a week of the scheduled office visit. Will discuss the results of the previsit Labs during the office visit. Diagnoses and all orders for this visit:    CKD (chronic kidney disease) stage 4, GFR 15-29 ml/min ([facility])  -     Basic metabolic panel; Future  -     dapagliflozin (Farxiga) 10 MG tablet; Take 1 tablet (10 mg total) by mouth daily  -     Basic metabolic panel; Future  -     Protein / creatinine ratio, urine; Future  -     PTH, intact; Future  -     Urinalysis with microscopic; Future  -     Phosphorus; Future  -     Magnesium; Future  -     CBC; Future  -     Albumin / creatinine urine ratio; Future    Benign hypertension with chronic kidney disease, stage IV (HCC)  -     Basic metabolic panel;  Future    Secondary hyperparathyroidism of renal origin ([address])  -     PTH, intact; Future  -     Discontinue: calcitriol (ROCALTROL) 0.25 mcg capsule; Take 1 capsule (0.25 mcg total) by mouth daily  -     calcitriol (ROCALTROL) 0.25 mcg capsule; Take 1 capsule (0.25 mcg total) by mouth daily    Vitamin D deficiency  -     Vitamin D 25 hydroxy; Future    Chronic diastolic CHF (congestive heart failure) ([facility])  -     Basic metabolic panel; Future    Persistent proteinuria, unspecified  -     Protein / creatinine ratio, urine; Future    Iron deficiency anemia, unspecified iron deficiency anemia type  -     CBC; Future  -     Iron Panel (Includes Ferritin, Iron Sat%, Iron, and TIBC); Future    Controlled type 2 diabetes mellitus with stage 4 chronic kidney disease, without long-term current use of insulin ([facility])  -     dapagliflozin (Farxiga) 10 MG tablet; Take 1 tablet (10 mg total) by mouth daily  -     Basic metabolic panel; Future    Hyperuricemia  -     Uric acid;  Future            SUBJECTIVE / HPI:  Taniya Morejon is a 78 y.o.  male  with medical issues of chronic kidney disease, HTN x 15 yrs,  DM-2 anemia, colitis  , CAD s/p CABG who presents for follow-up of chronic kidney disease stage 3.     Review of records, patient has elevated creatinine dating back to 2018 November when the creatinine was 1.3.  It was stable at 1.4 to 1.7 in 2020.  Since June 2021 creatinine has been around 1.6-1.8  It had worsened to creatinine 2.49 on 12/14/21, likely prerenal in the setting of abdominal pain and diarrhea and renal function improved to creatinine 1.7-1.8 in January 2022.     Patient with baseline creatinine 1.6-1.8 he was at [facility] and underwent cardiac PCI on 05/24/22.  Creatinine since May has been around 2.3-2.4, improved to 2.0 on 05/25 likely due to hydration during PCI.     He was again admitted from June 14 to June 17,2022 underwent TAVR on June 14th 2022.  Recently creatinine has been around 2.0-2.25 since October 2022 this is most likely his new baseline    Reviewed labs from [address] [place] [address]. Renal function since May 2023 has been around creatinine 2.1-2.3. This is most likely the new baseline    Blood work from August 31, 2023 was reviewed, creatinine 2.26 mg/dL with stable electrolytes. Hemoglobin improving to 11.8 g/dL, PTH level slightly trending up to 169.9, UPC ratio slightly worsened to 0.42  -No new complaints, no urinary symptoms         REVIEW OF SYSTEMS:    Review of Systems   Constitutional: Negative for chills and fever. HENT: Negative for ear pain and sore throat. Eyes: Negative for pain and visual disturbance. Respiratory: Negative for cough and shortness of breath. Cardiovascular: Negative for chest pain and palpitations. Gastrointestinal: Negative for abdominal pain and vomiting. Genitourinary: Negative for dysuria and hematuria. Musculoskeletal: Negative for arthralgias and back pain. Skin: Negative for color change and rash. Neurological: Negative for seizures and syncope. All other systems reviewed and are negative. More than 10 point review of systems were obtained and discussed in length with the patient. Complete review of systems were negative / unremarkable except mentioned above.        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 ([address]) 2/24/2023   • Basal cell carcinoma     right cheek   • CAD (coronary artery disease)    • Carotid stenosis, asymptomatic, bilateral    • Chronic kidney disease    • Colon polyp    • Diabetes ([address])     type 2, non-insulin dependent   • Diabetes mellitus (HCC)    • GERD (gastroesophageal reflux disease)    • History of nephrolithiasis    • Hyperlipidemia    • Hypertension    • Left foot pain 11/30/2022   • PAD (peripheral artery disease) ([address] )    • Severe aortic stenosis    • Squamous cell skin cancer 11/22/2022    left superior helix       PAST SURGICAL HISTORY:  Past Surgical History:   Procedure Laterality Date   • APPENDECTOMY     • CARDIAC CATHETERIZATION N/A 05/05/2022    Procedure: CARDIAC RHC/LHC; Surgeon: Lionel Emmanuel DO;  [facility]; Service: Cardiology   • CARDIAC CATHETERIZATION N/A 05/05/2022    Procedure: Cardiac Coronary Angiogram;  Surgeon: Lionel Emmanuel DO;  [facility]; Service: Cardiology   • CARDIAC CATHETERIZATION N/A 05/24/2022    Procedure: Cardiac pci;  Surgeon: Amber Batista MD;  [facility];   Service: Cardiology   • CARDIAC CATHETERIZATION N/A 06/14/2022    Procedure: CARDIAC TAVR;  Surgeon: Debbie Parish MD;  Location[facility];  Service: Cardiology   • COLECTOMY     • COLONOSCOPY  2013   • CORONARY ARTERY BYPASS GRAFT  2013    X 2   • FEMORAL ARTERY - POPLITEAL ARTERY BYPASS GRAFT     • HEMORRHOID SURGERY     • IR AORTAGRAM WITH RUN-OFF  11/19/2018   • IR AORTAGRAM WITH RUN-OFF  3/2/2023   • IR LOWER EXTREMITY ANGIOGRAM  3/23/2023   • MOHS SURGERY Left 01/11/2023    SCC left superior helix-Dr Tovar   • CA SLCTV CATHJ 3RD+ ORD SLCTV ABDL PEL[facility] 08/12/2016    Procedure: LEFT FEMORAL ARTERIOGRAM; BALLOON ANGIOPLASTY; SFA  AND FEMORAL AT VEIN GRAFT;  Surgeon: Donna Perez MD;  Location: [facility];  Service: Vascular   • CA TEAEC W/WO PATCH GRAFT COMMON FEMORAL Right 3/23/2023    Procedure: Common femoral endarterectomy&antegrade intervention of SFA, popliteal artery w/ shockwave;  Surgeon: Yenny Saunders MD;  Location: [facility];  Service: Vascular   • CA TRANSCATHETER TRANSAPICAL REPLACEMT AORTIC VALVE N/A 06/14/2022    Procedure: REPLACEMENT AORTIC VALVE TRANSCATHETER (TAVR) TRANSAPICAL 29MM IRVIN KYLER S3 ULTRA VALVE(ACCESS ON LEFT) ELANA;  Surgeon: Nnamdi Mera DO;  Location: [facility];  Service: Cardiac Surgery   • SKIN CANCER EXCISION     • TONSILLECTOMY AND ADENOIDECTOMY         SOCIAL HISTORY:  Social History     Substance and Sexual Activity   Alcohol Use Not Currently    Comment: rare     Social History     Substance and Sexual Activity   Drug Use No     Social History     Tobacco Use   Smoking Status Some Days   • Packs/day: 0.25   • Years: 50.00   • Total pack years: 12.50   • Types: Cigarettes   Smokeless Tobacco Never       FAMILY HISTORY:  Family History   Problem Relation Age of Onset   • Heart attack Father    • Other Sister         bypass and vlave replacement   • Stroke Paternal Uncle    • Arrhythmia Neg Hx    • Asthma Neg Hx    • Clotting disorder Neg Hx    • Fainting Neg Hx    • Anuerysm Neg Hx    • Hypertension Neg Hx         unsure    • Hyperlipidemia Neg Hx    • Heart failure Neg Hx        MEDICATIONS:    Current Outpatient Medications:   •  allopurinol (ZYLOPRIM) 300 mg tablet, Take 1 tablet (300 mg total) by mouth daily, Disp: 90 tablet, Rfl: 3  •  amLODIPine (NORVASC) 10 mg tablet, TAKE 1 TABLET DAILY, Disp: 90 tablet, Rfl: 3  •  Ascorbic Acid (vitamin C) 100 MG tablet, Take 250 mg by mouth daily, Disp: , Rfl:   •  aspirin (ECOTRIN LOW STRENGTH) 81 mg EC tablet, Take 81 mg by mouth daily, Disp: , Rfl:   •  atorvastatin (LIPITOR) 80 mg tablet, TAKE 1 TABLET DAILY AT     BEDTIME, Disp: 90 tablet, Rfl: 3  •  calcitriol (ROCALTROL) 0.25 mcg capsule, Take 1 capsule (0.25 mcg total) by mouth daily, Disp: 90 capsule, Rfl: 4  •  Cholecalciferol (Vitamin D3 Ultra Potency) 1.25 MG (63698 UT) TABS, Take 1 tablet (50,000 Units total) by mouth once a week, Disp: 16 tablet, Rfl: 1  •  clopidogrel (PLAVIX) 75 mg tablet, Take 1 tablet (75 mg total) by mouth daily, Disp: 90 tablet, Rfl: 3  •  dapagliflozin (Farxiga) 10 MG tablet, Take 1 tablet (10 mg total) by mouth daily, Disp: 90 tablet, Rfl: 3  •  glimepiride (AMARYL) 1 mg tablet, Take 1 tablet (1 mg total) by mouth daily with breakfast, Disp: 90 tablet, Rfl: 1  •  lisinopril (ZESTRIL) 20 mg tablet, Take 1 tablet (20 mg total) by mouth daily, Disp: 90 tablet, Rfl: 3  •  metoprolol succinate (TOPROL-XL) 25 mg 24 hr tablet, Take 0.5 tablets (12.5 mg total) by mouth daily, Disp: 90 tablet, Rfl: 1  •  Multiple Vitamin (multivitamin) tablet, Take 1 tablet by mouth daily Takes when he can remember. , Disp: , Rfl:   •  pancrelipase, Lip-Prot-Amyl, (CREON) 24,000 units, Take 24,000 units of lipase by mouth 3 (three) times a day with meals, Disp: 300 capsule, Rfl: 2  •  pantoprazole (PROTONIX) 40 mg tablet, TAKE 1 TABLET DAILY, Disp: 90 tablet, Rfl: 1  •  torsemide (DEMADEX) 20 mg tablet, Take 1 tablet (20 mg total) by mouth 2 (two) times a day (Patient taking differently: Take 10 mg by mouth daily Takes 10 mg once a day.), Disp: 180 tablet, Rfl: 3  •  acetaminophen (TYLENOL) 325 mg tablet, Take 3 tablets (975 mg total) by mouth every 8 (eight) hours (Patient not taking: Reported on 6/13/2023), Disp: , Rfl: 0  •  Cyanocobalamin (VITAMIN B12 PO), Take by mouth once a week (Patient not taking: Reported on 6/13/2023), Disp: , Rfl:   •  docusate sodium (COLACE) 100 mg capsule, Take 1 capsule (100 mg total) by mouth 2 (two) times a day (Patient not taking: Reported on 6/13/2023), Disp: , Rfl: 0  •  glipiZIDE (GLUCOTROL) 5 mg tablet, Take 1 tablet (5 mg total) by mouth daily with breakfast (Patient not taking: Reported on 9/5/2023), Disp: 90 tablet, Rfl: 1  •  Magnesium Oxide (MAG-200 PO), Take by mouth once a week (Patient not taking: Reported on 6/13/2023), Disp: , Rfl:       PHYSICAL EXAM:  Vitals:    09/05/23 1121 09/05/23 1140   BP: 146/80 130/50   BP Location: Left arm    Patient Position: Sitting    Cuff Size: Adult    Pulse:  55   Weight: 78.5 kg (173 lb)    Height: 5' 10" (1.778 m)      Body mass index is 24.82 kg/m². Physical Exam  Constitutional:       General: He is not in acute distress. Appearance: He is well-developed. He is not diaphoretic.    HENT:      Head: Normocephalic and atraumatic. Mouth/Throat:      Mouth: Mucous membranes are moist.   Eyes:      General: No scleral icterus. Conjunctiva/sclera: Conjunctivae normal.      Pupils: Pupils are equal, round, and reactive to light. Neck:      Thyroid: No thyromegaly. Cardiovascular:      Rate and Rhythm: Normal rate and regular rhythm. Heart sounds: Murmur heard. No friction rub. Pulmonary:      Effort: Pulmonary effort is normal. No respiratory distress. Breath sounds: Normal breath sounds. No wheezing or rales. Abdominal:      General: Bowel sounds are normal. There is no distension. Palpations: Abdomen is soft. Tenderness: There is no abdominal tenderness. Musculoskeletal:         General: No deformity. Cervical back: Neck supple. Right lower leg: No edema. Left lower leg: No edema. Lymphadenopathy:      Cervical: No cervical adenopathy. Skin:     Coloration: Skin is not pale. Nails: There is no clubbing. Neurological:      Mental Status: He is alert and oriented to person, place, and time. He is not disoriented. Psychiatric:         Mood and Affect: Mood normal. Mood is not anxious. Affect is not inappropriate. Behavior: Behavior normal.         Thought Content:  Thought content normal.         Lab Results:   Results for orders placed or performed in visit on 08/31/23   Protein / creatinine ratio, urine   Result Value Ref Range    Creatinine, Ur 26.3 Reference range not established. mg/dL    Protein Urine Random 11 Reference range not established. mg/dL    Prot/Creat Ratio, Ur 0.42 (H) 0.00 - 0.10   PTH, intact   Result Value Ref Range    .9 (H) 12.0 - 88.0 pg/mL   Urinalysis with microscopic   Result Value Ref Range    Color, UA Colorless     Clarity, UA Clear     Specific Gravity, UA 1.008 1.003 - 1.030    pH, UA 6.0 4.5, 5.0, 5.5, 6.0, 6.5, 7.0, 7.5, 8.0    Leukocytes, UA Negative Negative    Nitrite, UA Negative Negative Protein, UA Negative Negative mg/dl    Glucose, UA Negative Negative mg/dl    Ketones, UA Negative Negative mg/dl    Urobilinogen, UA <2.0 <2.0 mg/dl mg/dl    Bilirubin, UA Negative Negative    Occult Blood, UA Negative Negative    RBC, UA None Seen None Seen, 1-2 /hpf    WBC, UA None Seen None Seen, 1-2 /hpf    Epithelial Cells None Seen None Seen, Occasional /hpf    Bacteria, UA None Seen None Seen, Occasional /hpf   Phosphorus   Result Value Ref Range    Phosphorus 3.7 2.3 - 4.1 mg/dL   Magnesium   Result Value Ref Range    Magnesium 2.2 1.9 - 2.7 mg/dL   Hemoglobin A1C   Result Value Ref Range    Hemoglobin A1C 6.7 (H) Normal 4.0-5.6%; PreDiabetic 5.7-6.4%;  Diabetic >=6.5%; Glycemic control for adults with diabetes <7.0% %     mg/dl   Comprehensive metabolic panel   Result Value Ref Range    Sodium 139 135 - 147 mmol/L    Potassium 4.2 3.5 - 5.3 mmol/L    Chloride 103 96 - 108 mmol/L    CO2 25 21 - 32 mmol/L    ANION GAP 11 mmol/L    BUN 42 (H) 5 - 25 mg/dL    Creatinine 2.26 (H) 0.60 - 1.30 mg/dL    Glucose 52 (L) 65 - 140 mg/dL    Calcium 9.3 8.4 - 10.2 mg/dL    AST 19 13 - 39 U/L    ALT 9 7 - 52 U/L    Alkaline Phosphatase 84 34 - 104 U/L    Total Protein 7.3 6.4 - 8.4 g/dL    Albumin 4.1 3.5 - 5.0 g/dL    Total Bilirubin 0.38 0.20 - 1.00 mg/dL    eGFR 26 ml/min/1.73sq m   CBC and differential   Result Value Ref Range    WBC 6.02 4.31 - 10.16 Thousand/uL    RBC 3.89 3.88 - 5.62 Million/uL    Hemoglobin 11.8 (L) 12.0 - 17.0 g/dL    Hematocrit 36.9 36.5 - 49.3 %    MCV 95 82 - 98 fL    MCH 30.3 26.8 - 34.3 pg    MCHC 32.0 31.4 - 37.4 g/dL    RDW 15.2 (H) 11.6 - 15.1 %    MPV 11.5 8.9 - 12.7 fL    Platelets 160 238 - 663 Thousands/uL    nRBC 0 /100 WBCs    Neutrophils Relative 63 43 - 75 %    Immat GRANS % 0 0 - 2 %    Lymphocytes Relative 21 14 - 44 %    Monocytes Relative 10 4 - 12 %    Eosinophils Relative 5 0 - 6 %    Basophils Relative 1 0 - 1 %    Neutrophils Absolute 3.77 1.85 - 7.62 Thousands/µL Immature Grans Absolute 0.02 0.00 - 0.20 Thousand/uL    Lymphocytes Absolute 1.29 0.60 - 4.47 Thousands/µL    Monocytes Absolute 0.61 0.17 - 1.22 Thousand/µL    Eosinophils Absolute 0.30 0.00 - 0.61 Thousand/µL    Basophils Absolute 0.03 0.00 - 0.10 Thousands/µL

## 2023-09-05 NOTE — PATIENT INSTRUCTIONS
-Renal Function is stable   -You have Chronic Kidney Disease Stage 4  -Avoid NSAIDs like Ibuprofen/Motrin, Naproxen/Aleve, Celebrex, meloxicam/Mobic, Diclofenac and other NSAIDs.  -Okay to take Acetaminophen/Tylenol if you do not have any liver problems  -Avoid IV contrast used for CT scan and cardiac catheterization.    -If plan for any study with IV contrast, please let me know so we could hydrate with fluids before and after IV contrast  -Dosage  of certain medications may need to be adjusted depending on Kidney function. Blood pressure is stable    -Recommend 2 g sodium diet. -Recommend daily exercise and weight loss  -Discussed home monitoring of BP and maintaining a log of blood pressure.  -Recommend goal BP less than 130/80. Please inform me if SBP below 110 or above 140's persistently. [place] [id number]. Increase calcitriol to once daily. Check blood work in a month. Increase oral hydration    Follow up: 4 months with repeat Lab work within a week of the scheduled office visit. Will discuss the results of the previsit Labs during the office visit.

## 2023-09-26 ENCOUNTER — TELEPHONE (OUTPATIENT)
Dept: NEPHROLOGY | Facility: CLINIC | Age: 78
End: 2023-09-26

## 2023-09-26 DIAGNOSIS — N18.4 CONTROLLED TYPE 2 DIABETES MELLITUS WITH STAGE 4 CHRONIC KIDNEY DISEASE, WITHOUT LONG-TERM CURRENT USE OF INSULIN (HCC): ICD-10-CM

## 2023-09-26 DIAGNOSIS — N18.4 CKD (CHRONIC KIDNEY DISEASE) STAGE 4, GFR 15-29 ML/MIN (HCC): ICD-10-CM

## 2023-09-26 DIAGNOSIS — E11.22 CONTROLLED TYPE 2 DIABETES MELLITUS WITH STAGE 4 CHRONIC KIDNEY DISEASE, WITHOUT LONG-TERM CURRENT USE OF INSULIN (HCC): ICD-10-CM

## 2023-09-26 NOTE — TELEPHONE ENCOUNTER
Patient will be calling back to verify if Janis Tee is cheaper then Maria Teresa Merida after he calls the [address] [place] [address] pharmacy in [facility] and let us know if he wants the [employer] mailed, faxed or picked up.

## 2023-10-04 NOTE — TELEPHONE ENCOUNTER
Patient called to state he does not wish to take the Jardiance anymore. It has become to expensive. Even with his insurance he states he will have a 500$ deductible starting in January. Patient states he will revisit the idea in January.

## 2023-10-12 ENCOUNTER — OFFICE VISIT (OUTPATIENT)
Dept: DERMATOLOGY | Facility: CLINIC | Age: 78
End: 2023-10-12
Payer: MEDICARE

## 2023-10-12 ENCOUNTER — TELEPHONE (OUTPATIENT)
Dept: VASCULAR SURGERY | Facility: CLINIC | Age: 78
End: 2023-10-12

## 2023-10-12 VITALS — BODY MASS INDEX: 24.62 KG/M2 | HEIGHT: 70 IN | TEMPERATURE: 97.8 F | WEIGHT: 172 LBS

## 2023-10-12 DIAGNOSIS — L82.1 SEBORRHEIC KERATOSIS: ICD-10-CM

## 2023-10-12 DIAGNOSIS — Z85.828 HISTORY OF SCC (SQUAMOUS CELL CARCINOMA) OF SKIN: Primary | ICD-10-CM

## 2023-10-12 DIAGNOSIS — D22.71 MULTIPLE BENIGN MELANOCYTIC NEVI OF UPPER AND LOWER EXTREMITIES AND TRUNK: ICD-10-CM

## 2023-10-12 DIAGNOSIS — D22.5 MULTIPLE BENIGN MELANOCYTIC NEVI OF UPPER AND LOWER EXTREMITIES AND TRUNK: ICD-10-CM

## 2023-10-12 DIAGNOSIS — D22.72 MULTIPLE BENIGN MELANOCYTIC NEVI OF UPPER AND LOWER EXTREMITIES AND TRUNK: ICD-10-CM

## 2023-10-12 DIAGNOSIS — D22.61 MULTIPLE BENIGN MELANOCYTIC NEVI OF UPPER AND LOWER EXTREMITIES AND TRUNK: ICD-10-CM

## 2023-10-12 DIAGNOSIS — D22.62 MULTIPLE BENIGN MELANOCYTIC NEVI OF UPPER AND LOWER EXTREMITIES AND TRUNK: ICD-10-CM

## 2023-10-12 DIAGNOSIS — L85.3 XEROSIS OF SKIN: ICD-10-CM

## 2023-10-12 DIAGNOSIS — D18.01 CHERRY ANGIOMA: ICD-10-CM

## 2023-10-12 DIAGNOSIS — L57.0 ACTINIC KERATOSIS: ICD-10-CM

## 2023-10-12 DIAGNOSIS — L81.4 LENTIGO: ICD-10-CM

## 2023-10-12 PROCEDURE — 17003 DESTRUCT PREMALG LES 2-14: CPT | Performed by: DERMATOLOGY

## 2023-10-12 PROCEDURE — 17000 DESTRUCT PREMALG LESION: CPT | Performed by: DERMATOLOGY

## 2023-10-12 PROCEDURE — 99214 OFFICE O/P EST MOD 30 MIN: CPT | Performed by: DERMATOLOGY

## 2023-10-12 NOTE — TELEPHONE ENCOUNTER
Attempted to contact patient to schedule appointment(s) listed below. Requested patient call [phone] option 3 to schedule appointment(s). Patient's appointment(s) are due on or after 12/12/2023 . Dopplers  [] Abdominal Aorta Iliac (AOIL)  [x] Carotid (CV) 12/12/2023  [] Celiac and/or Mesenteric  [] Endovascular Aortic Repair (EVAR)   [] Hemodialysis Access (HD)   [x] Lower Limb Arterial (FREDO) 12/12/2023  [] Lower Limb Venous (LEV)  [] Lower Limb Venous Duplex with Reflux (LEVDR)  [] Renal Artery  [] Upper Limb Arterial (UEA)    [] Upper Limb Venous (UEV)              [] SAMEER and Waveform analysis     Advanced Imaging   [] CTA head/neck    [] CTA abdomen    [] CTA abdomen & pelvis    [] CT abdomen with/ without contrast  [] CT abdomen with contrast  [] CT abdomen without contrast    [] CT abdomen & pelvis with/ without contrast  [] CT abdomen & pelvis with contrast  [] CT abdomen & pelvis without contrast    Office Visit   [] New patient, patient last seen over 3 years ago  [] Risk factor modification (RFM)   [x] Follow up 6 mos.  F/u.    [] Lost to follow up (LTFU)

## 2023-10-12 NOTE — PROGRESS NOTES
[facility] Note     Patient Name: Tarah Mejía. Encounter Date: 10/12/2023     Have you been cared for by a [facility] in the last 3 years and, if so, which description applies to you? Yes. I have been here within the last 3 years, and my medical history has NOT changed since that time. I am MALE/not capable of bearing children. REVIEW OF SYSTEMS:  Have you recently had or currently have any of the following? No changes in my recent health. PAST MEDICAL HISTORY:  Have you personally ever had or currently have any of the following? If "YES," then please provide more detail. No changes in my medical history. FAMILY HISTORY:  Any "first degree relatives" (parent, brother, sister, or child) with the following? No changes in my family's known health. PATIENT EXPERIENCE:    Do you want the Dermatologist to perform a COMPLETE skin exam today including a clinical examination under the "bra and underwear" areas? Yes  If necessary, do we have your permission to call and leave a detailed message on your Preferred Phone number that includes your specific medical information?   Yes      No Known Allergies   Current Outpatient Medications:     acetaminophen (TYLENOL) 325 mg tablet, Take 3 tablets (975 mg total) by mouth every 8 (eight) hours (Patient not taking: Reported on 6/13/2023), Disp: , Rfl: 0    allopurinol (ZYLOPRIM) 300 mg tablet, Take 1 tablet (300 mg total) by mouth daily, Disp: 90 tablet, Rfl: 3    amLODIPine (NORVASC) 10 mg tablet, TAKE 1 TABLET DAILY, Disp: 90 tablet, Rfl: 3    Ascorbic Acid (vitamin C) 100 MG tablet, Take 250 mg by mouth daily, Disp: , Rfl:     aspirin (ECOTRIN LOW STRENGTH) 81 mg EC tablet, Take 81 mg by mouth daily, Disp: , Rfl:     atorvastatin (LIPITOR) 80 mg tablet, TAKE 1 TABLET DAILY AT     BEDTIME, Disp: 90 tablet, Rfl: 3    calcitriol (ROCALTROL) 0.25 mcg capsule, Take 1 capsule (0.25 mcg total) by mouth daily, Disp: 90 capsule, Rfl: 4    Cholecalciferol (Vitamin D3 Ultra Potency) 1.25 MG (96596 UT) TABS, Take 1 tablet (50,000 Units total) by mouth once a week, Disp: 16 tablet, Rfl: 1    clopidogrel (PLAVIX) 75 mg tablet, Take 1 tablet (75 mg total) by mouth daily, Disp: 90 tablet, Rfl: 3    Cyanocobalamin (VITAMIN B12 PO), Take by mouth once a week (Patient not taking: Reported on 6/13/2023), Disp: , Rfl:     docusate sodium (COLACE) 100 mg capsule, Take 1 capsule (100 mg total) by mouth 2 (two) times a day (Patient not taking: Reported on 6/13/2023), Disp: , Rfl: 0    Empagliflozin (Jardiance) 10 MG TABS tablet, Take 1 tablet (10 mg total) by mouth in the morning, Disp: 90 tablet, Rfl: 3    glimepiride (AMARYL) 1 mg tablet, Take 1 tablet (1 mg total) by mouth daily with breakfast, Disp: 90 tablet, Rfl: 1    glipiZIDE (GLUCOTROL) 5 mg tablet, Take 1 tablet (5 mg total) by mouth daily with breakfast (Patient not taking: Reported on 9/5/2023), Disp: 90 tablet, Rfl: 1    lisinopril (ZESTRIL) 20 mg tablet, Take 1 tablet (20 mg total) by mouth daily, Disp: 90 tablet, Rfl: 3    Magnesium Oxide (MAG-200 PO), Take by mouth once a week (Patient not taking: Reported on 6/13/2023), Disp: , Rfl:     metoprolol succinate (TOPROL-XL) 25 mg 24 hr tablet, Take 0.5 tablets (12.5 mg total) by mouth daily, Disp: 90 tablet, Rfl: 1    Multiple Vitamin (multivitamin) tablet, Take 1 tablet by mouth daily Takes when he can remember. , Disp: , Rfl:     pancrelipase, Lip-Prot-Amyl, (CREON) 24,000 units, Take 24,000 units of lipase by mouth 3 (three) times a day with meals, Disp: 300 capsule, Rfl: 2    pantoprazole (PROTONIX) 40 mg tablet, TAKE 1 TABLET DAILY, Disp: 90 tablet, Rfl: 1    torsemide (DEMADEX) 20 mg tablet, Take 1 tablet (20 mg total) by mouth 2 (two) times a day (Patient taking differently: Take 10 mg by mouth daily Takes 10 mg once a day.), Disp: 180 tablet, Rfl: 3          Whom besides the patient is providing clinical information about today's encounter? NO ADDITIONAL HISTORIAN (patient alone provided history)    Physical Exam and Assessment/Plan by Diagnosis:    HISTORY OF SQUAMOUS CELL CARCINOMA     Physical Exam:  Anatomic Location Affected:  left superior helix  Morphological Description of Scar:  Well healed scar  Suspected Recurrence: no  Regional adenopathy: no    Additional History of Present Condition:  Patient had Mohs on 01/11/2023 by Dr. Damien Willis and Plan:  Based on a thorough discussion of this condition and the management approach to it (including a comprehensive discussion of the known risks, side effects and potential benefits of treatment), the patient (family) agrees to implement the following specific plan:  When outside we recommend using a wide brim hat, sunglasses, long sleeve and pants, sunscreen with SPF 49+ with reapplication every 2 hours, or SPF specific clothing    Continue to monitor site for any changes     How can SCC be prevented? The most important way to prevent SCC is to avoid sunburn. This is especially important in childhood and early life. Fair skinned individuals and those with a personal or family history of BCC should protect their skin from sun exposure daily, year-round and lifelong. Stay indoors or under the shade in the middle of the day   Wear covering clothing   Apply high protection factor SPF50+ broad-spectrum sunscreens generously to exposed skin if outdoors   Avoid indoor tanning (sun beds, solaria)      What is the outlook for SCC? Most SCC are cured by treatment. Cure is most likely if treatment is undertaken when the lesion is small. A small percent of SCC's can spread to lymph  nodes and long term monitoring is indicated. They are also at increased risk of other skin cancers, especially melanoma. Regular self-skin examinations and long-term annual skin checks by an experienced health professional are recommended.      MELANOCYTIC NEVI ("Moles")    Physical Exam:  Anatomic Location Affected:   Mostly on sun-exposed areas of the trunk and extremities  Morphological Description:  Scattered, 1-4mm round to ovoid, symmetrical-appearing, even bordered, skin colored to dark brown macules/papules, mostly in sun-exposed areas  Pertinent Positives:  Pertinent Negatives: Additional History of Present Condition:      Assessment and Plan:  Based on a thorough discussion of this condition and the management approach to it (including a comprehensive discussion of the known risks, side effects and potential benefits of treatment), the patient (family) agrees to implement the following specific plan:  When outside we recommend using a wide brim hat, sunglasses, long sleeve and pants, sunscreen with SPF 67+ with reapplication every 2 hours, or SPF specific clothing   Benign, reassured  Annual skin check     Melanocytic Nevi  Melanocytic nevi ("moles") are tan or brown, raised or flat areas of the skin which have an increased number of melanocytes. Melanocytes are the cells in our body which make pigment and account for skin color. Some moles are present at birth (I.e., "congenital nevi"), while others come up later in life (i.e., "acquired nevi"). The sun can stimulate the body to make more moles. Sunburns are not the only thing that triggers more moles. Chronic sun exposure can do it too. Clinically distinguishing a healthy mole from melanoma may be difficult, even for experienced dermatologists. The "ABCDE's" of moles have been suggested as a means of helping to alert a person to a suspicious mole and the possible increased risk of melanoma. The suggestions for raising alert are as follows:    Asymmetry: Healthy moles tend to be symmetric, while melanomas are often asymmetric. Asymmetry means if you draw a line through the mole, the two halves do not match in color, size, shape, or surface texture.  Asymmetry can be a result of rapid enlargement of a mole, the development of a raised area on a previously flat lesion, scaling, ulceration, bleeding or scabbing within the mole. Any mole that starts to demonstrate "asymmetry" should be examined promptly by a board certified dermatologist.     Border: Healthy moles tend to have discrete, even borders. The border of a melanoma often blends into the normal skin and does not sharply delineate the mole from normal skin. Any mole that starts to demonstrate "uneven borders" should be examined promptly by a board certified dermatologist.     Color: Healthy moles tend to be one color throughout. Melanomas tend to be made up of different colors ranging from dark black, blue, white, or red. Any mole that demonstrates a color change should be examined promptly by a board certified dermatologist.     Diameter: Healthy moles tend to be smaller than 0.6 cm in size; an exception are "congenital nevi" that can be larger. Melanomas tend to grow and can often be greater than 0.6 cm (1/4 of an inch, or the size of a pencil eraser). This is only a guideline, and many normal moles may be larger than 0.6 cm without being unhealthy. Any mole that starts to change in size (small to bigger or bigger to smaller) should be examined promptly by a board certified dermatologist.     Evolving: Healthy moles tend to "stay the same."  Melanomas may often show signs of change or evolution such as a change in size, shape, color, or elevation. Any mole that starts to itch, bleed, crust, burn, hurt, or ulcerate or demonstrate a change or evolution should be examined promptly by a board certified dermatologist.        LENTIGO    Physical Exam:  Anatomic Location Affected:  trunk, arms  Morphological Description:  Light brown macules  Pertinent Positives:  Pertinent Negatives:     Additional History of Present Condition:      Assessment and Plan:  Based on a thorough discussion of this condition and the management approach to it (including a comprehensive discussion of the known risks, side effects and potential benefits of treatment), the patient (family) agrees to implement the following specific plan:  When outside we recommend using a wide brim hat, sunglasses, long sleeve and pants, sunscreen with SPF 13+ with reapplication every 2 hours, or SPF specific clothing       What is a lentigo? A lentigo is a pigmented flat or slightly raised lesion with a clearly defined edge. Unlike an ephelis (freckle), it does not fade in the winter months. There are several kinds of lentigo. The name lentigo originally referred to its appearance resembling a small lentil. The plural of lentigo is lentigines, although “lentigos” is also in common use. Who gets lentigines? Lentigines can affect males and females of all ages and races. Solar lentigines are especially prevalent in fair skinned adults. Lentigines associated with syndromes are present at birth or arise during childhood. What causes lentigines? Common forms of lentigo are due to exposure to ultraviolet radiation:  Sun damage including sunburn   Indoor tanning   Phototherapy, especially photochemotherapy (PUVA)    Ionizing radiation, eg radiation therapy, can also cause lentigines. Several familial syndromes associated with widespread lentigines originate from mutations in Aung-MAP kinase, mTOR signaling and PTEN pathways. What is the treatment for lentigines? Most lentigines are left alone. Attempts to lighten them may not be successful. The following approaches are used:  SPF 50+ broad-spectrum sunscreen   Hydroquinone bleaching cream   Alpha hydroxy acids   Vitamin C   Retinoids   Azelaic acid   Chemical peels  Individual lesions can be permanently removed using:  Cryotherapy   Intense pulsed light   Pigment lasers    How can lentigines be prevented? Lentigines associated with exposure ultraviolet radiation can be prevented by very careful sun protection. Clothing is more successful at preventing new lentigines than are sunscreens.     What is the outlook for lentigines? Lentigines usually persist. They may increase in number with age and sun exposure. Some in sun-protected sites may fade and disappear. CHERRY ANGIOMAS    Physical Exam:  Anatomic Location Affected:  trunk  Morphological Description:  Scattered cherry red, 1-4 mm papules. Pertinent Positives:  Pertinent Negatives: Additional History of Present Condition:      Assessment and Plan:  Based on a thorough discussion of this condition and the management approach to it (including a comprehensive discussion of the known risks, side effects and potential benefits of treatment), the patient (family) agrees to implement the following specific plan:  Monitor for changes  Benign, reassured      Assessment and Plan:    Cherry angioma, also known as Bindu Frank spots, are benign vascular skin lesions. A "cherry angioma" is a firm red, blue or purple papule, 0.1-1 cm in diameter. When thrombosed, they can appear black in colour until evaluated with a dermatoscope when the red or purple colour is more easily seen. Cherry angioma may develop on any part of the body but most often appear on the scalp, face, lips and trunk. An angioma is due to proliferating endothelial cells; these are the cells that line the inside of a blood vessel. Angiomas can arise in early life or later in life; the most common type of angioma is a cherry angioma. Cherry angiomas are very common in males and females of any age or race. They are more noticeable in white skin than in skin of colour. They markedly increase in number from about the age of 36. There may be a family history of similar lesions. Eruptive cherry angiomas have been rarely reported to be associated with internal malignancy. The cause of angiomas is unknown.  Genetic analysis of cherry angiomas has shown that they frequently carry specific somatic missense mutations in the GNAQ and GNA11 (Q209H) genes, which are involved in other vascular and melanocytic proliferations. SEBORRHEIC KERATOSIS; NON-INFLAMED    Physical Exam:  Anatomic Location Affected:  trunk  Morphological Description:  Flat and raised, waxy, smooth to warty textured, yellow to brownish-grey to dark brown to blackish, discrete, "stuck-on" appearing papules. Pertinent Positives:  Pertinent Negatives: Additional History of Present Condition:      Assessment and Plan:  Based on a thorough discussion of this condition and the management approach to it (including a comprehensive discussion of the known risks, side effects and potential benefits of treatment), the patient (family) agrees to implement the following specific plan:  Monitor for changes  Benign, reassured      Seborrheic Keratosis  A seborrheic keratosis is a harmless warty spot that appears during adult life as a common sign of skin aging. Seborrheic keratoses can arise on any area of skin, covered or uncovered, with the usual exception of the palms and soles. They do not arise from mucous membranes. Seborrheic keratoses can have highly variable appearance. Seborrheic keratoses are extremely common. It has been estimated that over 90% of adults over the age of 61 years have one or more of them. They occur in males and females of all races, typically beginning to erupt in the 35s or 45s. They are uncommon under the age of 21 years. The precise cause of seborrhoeic keratoses is not known. Seborrhoeic keratoses are considered degenerative in nature. As time goes by, seborrheic keratoses tend to become more numerous. Some people inherit a tendency to develop a very large number of them; some people may have hundreds of them. There is no easy way to remove multiple lesions on a single occasion. Unless a specific lesion is "inflamed" and is causing pain or stinging/burning or is bleeding, most insurance companies do not authorize treatment.     XEROSIS ("DRY SKIN")    Physical Exam:  Anatomic Location Affected: diffuse  Morphological Description:  xerosis  Pertinent Positives:  Pertinent Negatives: Additional History of Present Condition:      Assessment and Plan:  Based on a thorough discussion of this condition and the management approach to it (including a comprehensive discussion of the known risks, side effects and potential benefits of treatment), the patient (family) agrees to implement the following specific plan:  Use moisturizer like Eucerin,Cerave or Aveeno Cream 3 times a day for the dry skin            Dry skin refers to skin that feels dry to touch. Dry skin has a dull surface with a rough, scaly quality. The skin is less pliable and cracked. When dryness is severe, the skin may become inflamed and fissured. Although any body site can be dry, dry skin tends to affect the shins more than any other site. Dry skin is lacking moisture in the outer horny cell layer (stratum corneum) and this results in cracks in the skin surface. Dry skin is also called xerosis, xeroderma or asteatosis (lack of fat). It can affect males and females of all ages. There is some racial variability in water and lipid content of the skin. Dry skin that starts in early childhood may be one of about 20 types of ichthyosis (fish-scale skin). There is often a family history of dry skin. Dry skin is commonly seen in people with atopic dermatitis. Nearly everyone > 60 years has dry skin. Dry skin that begins later may be seen in people with certain diseases and conditions. Postmenopausal women  Hypothyroidism  Chronic renal disease   Malnutrition and weight loss   Subclinical dermatitis   Treatment with certain drugs such as oral retinoids, diuretics and epidermal growth factor receptor inhibitors      What is the treatment for dry skin? The mainstay of treatment of dry skin and ichthyosis is moisturisers/emollients.  They should be applied liberally and often enough to:  Reduce itch   Improve the barrier function   Prevent entry of irritants, bacteria   Reduce transepidermal water loss. How can dry skin be prevented? Eliminate aggravating factors:  Reduce the frequency of bathing. A humidifier in winter and air conditioner in summer   Compare having a short shower with a prolonged soak in a bath. Use lukewarm, not hot, water. Replace standard soap with a substitute such as a synthetic detergent cleanser, water-miscible emollient, bath oil, anti-pruritic tar oil, colloidal oatmeal etc.   Apply an emollient liberally and often, particularly shortly after bathing, and when itchy. The drier the skin, the thicker this should be, especially on the hands. What is the outlook for dry skin? A tendency to dry skin may persist life-long, or it may improve once contributing factors are controlled. ACTINIC KERATOSIS    Physical Exam:  Anatomic Location Affected:  scalp, right shoulder, and left eyebrow  Morphological Description:  Scaly pink papules  Pertinent Positives:  Pertinent Negatives:      Assessment and Plan:  Based on a thorough discussion of this condition and the management approach to it (including a comprehensive discussion of the known risks, side effects and potential benefits of treatment), the patient (family) agrees to implement the following specific plan:  When outside we recommend using a wide brim hat, sunglasses, long sleeve and pants, sunscreen with SPF 22+ with reapplication every 2 hours, or SPF specific clothing   liquid nitrogen to treat areas. Consent obtained. Expect area to blister, crust, and then fall off within 2 weeks. Please use vaseline.                                      PROCEDURE:  DESTRUCTION OF PRE-MALIGNANT LESIONS  After a thorough discussion of treatment options and risk/benefits/alternatives (including but not limited to local pain, scarring, dyspigmentation, blistering, and possible superinfection), verbal and written consent were obtained and the aforementioned lesions were treated on with cryotherapy using liquid nitrogen x 1 cycle for 5-10 seconds. TOTAL NUMBER of 5 pre-malignant lesions were treated today on the ANATOMIC LOCATION: scalp, right shoulder, and left eyebrow     The patient tolerated the procedure well, and after-care instructions were provided.       Scribe Attestation      I,:  Raegan Mora am acting as a scribe while in the presence of the attending physician.:       I,:  Michelle Espitia MD personally performed the services described in this documentation    as scribed in my presence.:

## 2023-10-12 NOTE — PATIENT INSTRUCTIONS
HISTORY OF SQUAMOUS CELL CARCINOMA     Assessment and Plan:  Based on a thorough discussion of this condition and the management approach to it (including a comprehensive discussion of the known risks, side effects and potential benefits of treatment), the patient (family) agrees to implement the following specific plan:  When outside we recommend using a wide brim hat, sunglasses, long sleeve and pants, sunscreen with SPF 51+ with reapplication every 2 hours, or SPF specific clothing    Continue to monitor site for any changes     How can SCC be prevented? The most important way to prevent SCC is to avoid sunburn. This is especially important in childhood and early life. Fair skinned individuals and those with a personal or family history of BCC should protect their skin from sun exposure daily, year-round and lifelong. Stay indoors or under the shade in the middle of the day   Wear covering clothing   Apply high protection factor SPF50+ broad-spectrum sunscreens generously to exposed skin if outdoors   Avoid indoor tanning (sun beds, solaria)      What is the outlook for SCC? Most SCC are cured by treatment. Cure is most likely if treatment is undertaken when the lesion is small. A small percent of SCC's can spread to lymph  nodes and long term monitoring is indicated. They are also at increased risk of other skin cancers, especially melanoma. Regular self-skin examinations and long-term annual skin checks by an experienced health professional are recommended.      MELANOCYTIC NEVI ("Moles")    Assessment and Plan:  Based on a thorough discussion of this condition and the management approach to it (including a comprehensive discussion of the known risks, side effects and potential benefits of treatment), the patient (family) agrees to implement the following specific plan:  When outside we recommend using a wide brim hat, sunglasses, long sleeve and pants, sunscreen with SPF 40+ with reapplication every 2 hours, or SPF specific clothing   Benign, reassured  Annual skin check     Melanocytic Nevi  Melanocytic nevi ("moles") are tan or brown, raised or flat areas of the skin which have an increased number of melanocytes. Melanocytes are the cells in our body which make pigment and account for skin color. Some moles are present at birth (I.e., "congenital nevi"), while others come up later in life (i.e., "acquired nevi"). The sun can stimulate the body to make more moles. Sunburns are not the only thing that triggers more moles. Chronic sun exposure can do it too. Clinically distinguishing a healthy mole from melanoma may be difficult, even for experienced dermatologists. The "ABCDE's" of moles have been suggested as a means of helping to alert a person to a suspicious mole and the possible increased risk of melanoma. The suggestions for raising alert are as follows:    Asymmetry: Healthy moles tend to be symmetric, while melanomas are often asymmetric. Asymmetry means if you draw a line through the mole, the two halves do not match in color, size, shape, or surface texture. Asymmetry can be a result of rapid enlargement of a mole, the development of a raised area on a previously flat lesion, scaling, ulceration, bleeding or scabbing within the mole. Any mole that starts to demonstrate "asymmetry" should be examined promptly by a board certified dermatologist.     Border: Healthy moles tend to have discrete, even borders. The border of a melanoma often blends into the normal skin and does not sharply delineate the mole from normal skin. Any mole that starts to demonstrate "uneven borders" should be examined promptly by a board certified dermatologist.     Color: Healthy moles tend to be one color throughout. Melanomas tend to be made up of different colors ranging from dark black, blue, white, or red.   Any mole that demonstrates a color change should be examined promptly by a board certified dermatologist.     Diameter: Healthy moles tend to be smaller than 0.6 cm in size; an exception are "congenital nevi" that can be larger. Melanomas tend to grow and can often be greater than 0.6 cm (1/4 of an inch, or the size of a pencil eraser). This is only a guideline, and many normal moles may be larger than 0.6 cm without being unhealthy. Any mole that starts to change in size (small to bigger or bigger to smaller) should be examined promptly by a board certified dermatologist.     Evolving: Healthy moles tend to "stay the same."  Melanomas may often show signs of change or evolution such as a change in size, shape, color, or elevation. Any mole that starts to itch, bleed, crust, burn, hurt, or ulcerate or demonstrate a change or evolution should be examined promptly by a board certified dermatologist.        Lizeht Spaulding and Plan:  Based on a thorough discussion of this condition and the management approach to it (including a comprehensive discussion of the known risks, side effects and potential benefits of treatment), the patient (family) agrees to implement the following specific plan:  When outside we recommend using a wide brim hat, sunglasses, long sleeve and pants, sunscreen with SPF 04+ with reapplication every 2 hours, or SPF specific clothing       What is a lentigo? A lentigo is a pigmented flat or slightly raised lesion with a clearly defined edge. Unlike an ephelis (freckle), it does not fade in the winter months. There are several kinds of lentigo. The name lentigo originally referred to its appearance resembling a small lentil. The plural of lentigo is lentigines, although “lentigos” is also in common use. Who gets lentigines? Lentigines can affect males and females of all ages and races. Solar lentigines are especially prevalent in fair skinned adults. Lentigines associated with syndromes are present at birth or arise during childhood. What causes lentigines?   Common forms of lentigo are due to exposure to ultraviolet radiation:  Sun damage including sunburn   Indoor tanning   Phototherapy, especially photochemotherapy (PUVA)    Ionizing radiation, eg radiation therapy, can also cause lentigines. Several familial syndromes associated with widespread lentigines originate from mutations in Aung-MAP kinase, mTOR signaling and PTEN pathways. What is the treatment for lentigines? Most lentigines are left alone. Attempts to lighten them may not be successful. The following approaches are used:  SPF 50+ broad-spectrum sunscreen   Hydroquinone bleaching cream   Alpha hydroxy acids   Vitamin C   Retinoids   Azelaic acid   Chemical peels  Individual lesions can be permanently removed using:  Cryotherapy   Intense pulsed light   Pigment lasers    How can lentigines be prevented? Lentigines associated with exposure ultraviolet radiation can be prevented by very careful sun protection. Clothing is more successful at preventing new lentigines than are sunscreens. What is the outlook for lentigines? Lentigines usually persist. They may increase in number with age and sun exposure. Some in sun-protected sites may fade and disappear. RINALDI ANGIOMAS    Assessment and Plan:  Based on a thorough discussion of this condition and the management approach to it (including a comprehensive discussion of the known risks, side effects and potential benefits of treatment), the patient (family) agrees to implement the following specific plan:  Monitor for changes  Benign, reassured      Assessment and Plan:    Cherry angioma, also known as Bindu Frank spots, are benign vascular skin lesions. A "cherry angioma" is a firm red, blue or purple papule, 0.1-1 cm in diameter. When thrombosed, they can appear black in colour until evaluated with a dermatoscope when the red or purple colour is more easily seen.  Cherry angioma may develop on any part of the body but most often appear on the scalp, face, lips and trunk. An angioma is due to proliferating endothelial cells; these are the cells that line the inside of a blood vessel. Angiomas can arise in early life or later in life; the most common type of angioma is a cherry angioma. Cherry angiomas are very common in males and females of any age or race. They are more noticeable in white skin than in skin of colour. They markedly increase in number from about the age of 36. There may be a family history of similar lesions. Eruptive cherry angiomas have been rarely reported to be associated with internal malignancy. The cause of angiomas is unknown. Genetic analysis of cherry angiomas has shown that they frequently carry specific somatic missense mutations in the GNAQ and GNA11 (Q209H) genes, which are involved in other vascular and melanocytic proliferations. SEBORRHEIC KERATOSIS; NON-INFLAMED    Assessment and Plan:  Based on a thorough discussion of this condition and the management approach to it (including a comprehensive discussion of the known risks, side effects and potential benefits of treatment), the patient (family) agrees to implement the following specific plan:  Monitor for changes  Benign, reassured      Seborrheic Keratosis  A seborrheic keratosis is a harmless warty spot that appears during adult life as a common sign of skin aging. Seborrheic keratoses can arise on any area of skin, covered or uncovered, with the usual exception of the palms and soles. They do not arise from mucous membranes. Seborrheic keratoses can have highly variable appearance. Seborrheic keratoses are extremely common. It has been estimated that over 90% of adults over the age of 61 years have one or more of them. They occur in males and females of all races, typically beginning to erupt in the 35s or 45s. They are uncommon under the age of 21 years. The precise cause of seborrhoeic keratoses is not known.   Seborrhoeic keratoses are considered degenerative in nature. As time goes by, seborrheic keratoses tend to become more numerous. Some people inherit a tendency to develop a very large number of them; some people may have hundreds of them. There is no easy way to remove multiple lesions on a single occasion. Unless a specific lesion is "inflamed" and is causing pain or stinging/burning or is bleeding, most insurance companies do not authorize treatment. XEROSIS ("DRY SKIN")    Assessment and Plan:  Based on a thorough discussion of this condition and the management approach to it (including a comprehensive discussion of the known risks, side effects and potential benefits of treatment), the patient (family) agrees to implement the following specific plan:  Use moisturizer like Eucerin,Cerave or Aveeno Cream 3 times a day for the dry skin            Dry skin refers to skin that feels dry to touch. Dry skin has a dull surface with a rough, scaly quality. The skin is less pliable and cracked. When dryness is severe, the skin may become inflamed and fissured. Although any body site can be dry, dry skin tends to affect the shins more than any other site. Dry skin is lacking moisture in the outer horny cell layer (stratum corneum) and this results in cracks in the skin surface. Dry skin is also called xerosis, xeroderma or asteatosis (lack of fat). It can affect males and females of all ages. There is some racial variability in water and lipid content of the skin. Dry skin that starts in early childhood may be one of about 20 types of ichthyosis (fish-scale skin). There is often a family history of dry skin. Dry skin is commonly seen in people with atopic dermatitis. Nearly everyone > 60 years has dry skin. Dry skin that begins later may be seen in people with certain diseases and conditions.   Postmenopausal women  Hypothyroidism  Chronic renal disease   Malnutrition and weight loss   Subclinical dermatitis   Treatment with certain drugs such as oral retinoids, diuretics and epidermal growth factor receptor inhibitors      What is the treatment for dry skin? The mainstay of treatment of dry skin and ichthyosis is moisturisers/emollients. They should be applied liberally and often enough to:  Reduce itch   Improve the barrier function   Prevent entry of irritants, bacteria   Reduce transepidermal water loss. How can dry skin be prevented? Eliminate aggravating factors:  Reduce the frequency of bathing. A humidifier in winter and air conditioner in summer   Compare having a short shower with a prolonged soak in a bath. Use lukewarm, not hot, water. Replace standard soap with a substitute such as a synthetic detergent cleanser, water-miscible emollient, bath oil, anti-pruritic tar oil, colloidal oatmeal etc.   Apply an emollient liberally and often, particularly shortly after bathing, and when itchy. The drier the skin, the thicker this should be, especially on the hands. What is the outlook for dry skin? A tendency to dry skin may persist life-long, or it may improve once contributing factors are controlled. ACTINIC KERATOSIS    Assessment and Plan:  Based on a thorough discussion of this condition and the management approach to it (including a comprehensive discussion of the known risks, side effects and potential benefits of treatment), the patient (family) agrees to implement the following specific plan:  When outside we recommend using a wide brim hat, sunglasses, long sleeve and pants, sunscreen with SPF 72+ with reapplication every 2 hours, or SPF specific clothing   liquid nitrogen to treat areas. Consent obtained. Expect area to blister, crust, and then fall off within 2 weeks. Please use vaseline.                                      PROCEDURE:  DESTRUCTION OF PRE-MALIGNANT LESIONS  After a thorough discussion of treatment options and risk/benefits/alternatives (including but not limited to local pain, scarring, dyspigmentation, blistering, and possible superinfection), verbal and written consent were obtained and the aforementioned lesions were treated on with cryotherapy using liquid nitrogen x 1 cycle for 5-10 seconds.

## 2023-10-16 ENCOUNTER — TELEPHONE (OUTPATIENT)
Age: 78
End: 2023-10-16

## 2023-10-16 DIAGNOSIS — K86.81 EXOCRINE PANCREATIC INSUFFICIENCY: ICD-10-CM

## 2023-10-16 DIAGNOSIS — K86.81 EXOCRINE PANCREATIC INSUFFICIENCY: Primary | ICD-10-CM

## 2023-10-16 NOTE — TELEPHONE ENCOUNTER
Patients GI provider:  Dr. Kyung Crespo    Number to return call: [phone]    Reason for call: Pt calling Stating he's running low on his creon.  Pt states the pharmacy in [facility] and the pharmacy [facility] doesn't have Creon     Scheduled procedure/appointment date if applicable: N/A

## 2023-10-16 NOTE — TELEPHONE ENCOUNTER
Spoke with patient, regarding creon prescription. He stated he is in [facility] and all of the pharmacies do not have creon and would like an alternate medication. Please advise. Thank you.

## 2023-10-17 NOTE — TELEPHONE ENCOUNTER
I called and spoke to patient. He is requesting to have zenpep sent to his mail order pharmacy [facility] pharmacy. Please send a 90 day supply with refills.  Thank you

## 2023-10-18 DIAGNOSIS — K86.81 EXOCRINE PANCREATIC INSUFFICIENCY: ICD-10-CM

## 2023-10-18 NOTE — TELEPHONE ENCOUNTER
Patients GI provider:  Dr. Diana Jimenez     Number to return call: ( [phone]     Reason for call: Pt calling He is requesting to have zenpep sent to his mail order pharmacy [facility] pharmacy. Please send a 90 day supply with refills. The mail order has not received this medication he is asking if this can be sent over to Mail Order  please contact the pt once this is sent

## 2023-10-19 ENCOUNTER — NURSE TRIAGE (OUTPATIENT)
Age: 78
End: 2023-10-19

## 2023-10-19 DIAGNOSIS — K86.81 EXOCRINE PANCREATIC INSUFFICIENCY: ICD-10-CM

## 2023-10-19 DIAGNOSIS — K21.9 GASTROESOPHAGEAL REFLUX DISEASE, UNSPECIFIED WHETHER ESOPHAGITIS PRESENT: ICD-10-CM

## 2023-10-19 RX ORDER — PANTOPRAZOLE SODIUM 40 MG/1
40 TABLET, DELAYED RELEASE ORAL DAILY
Qty: 90 TABLET | Refills: 1 | Status: SHIPPED | OUTPATIENT
Start: 2023-10-19

## 2023-10-19 NOTE — TELEPHONE ENCOUNTER
Pt calling stating medication Creon needs to be prescribed for 90 days not 100 days per pt's pharmacy.

## 2023-10-19 NOTE — TELEPHONE ENCOUNTER
Left a voicemail for patient to callback. Was unable to leave message with detail due to no communication consent within chart. Provided phone number to callback.

## 2023-10-19 NOTE — TELEPHONE ENCOUNTER
Patient states that he always gets 3 boxes of 100 pills of creon. States that he wants the script updated to 300 pills not 270 pills. Patient has already spoke to someone regarding this because he states " they told me I can't do 100 day supply that it has to be a 90 day supply"  then he states " I want 300 pills so I am going to call back tomorrow to see if they updated it"  I explained to patient several times that the already has a 90 day supply ordered. Patient states " well this isn't the way it was when I was ordering from [employer]"   Reason for Disposition   Caller has NON-URGENT medicine question about med that PCP or specialist prescribed and triager unable to answer question    Answer Assessment - Initial Assessment Questions  1. NAME of MEDICATION: "What medicine are you calling about?"     Patient states that he always gets 3 boxes of 100 pills of creon. States that he wants the script updated to 300 pills not 270 pills. Patient has already spoke to someone regarding this because he states " they told me I can't do 100 day supply that it has to be a 90 day supply"  then he states " I want 300 pills so I am going to call back tomorrow to see if they updated it"  I explained to patient several times that the already has a 90 day supply ordered.   Patient states " well this isn't the way it was when I was ordering from [employer]"    Protocols used: Medication Question Call-ADULT-[place]

## 2023-10-24 DIAGNOSIS — K86.81 EXOCRINE PANCREATIC INSUFFICIENCY: ICD-10-CM

## 2023-10-31 DIAGNOSIS — I50.32 CHRONIC HEART FAILURE WITH PRESERVED EJECTION FRACTION (HCC): ICD-10-CM

## 2023-10-31 RX ORDER — TORSEMIDE 20 MG/1
10 TABLET ORAL DAILY
Qty: 90 TABLET | Refills: 3 | Status: SHIPPED | OUTPATIENT
Start: 2023-10-31

## 2023-11-01 DIAGNOSIS — N18.4 CONTROLLED TYPE 2 DIABETES MELLITUS WITH STAGE 4 CHRONIC KIDNEY DISEASE, WITHOUT LONG-TERM CURRENT USE OF INSULIN (HCC): ICD-10-CM

## 2023-11-01 DIAGNOSIS — E11.22 CONTROLLED TYPE 2 DIABETES MELLITUS WITH STAGE 4 CHRONIC KIDNEY DISEASE, WITHOUT LONG-TERM CURRENT USE OF INSULIN (HCC): ICD-10-CM

## 2023-11-01 RX ORDER — GLIMEPIRIDE 1 MG/1
1 TABLET ORAL
Qty: 90 TABLET | Refills: 1 | Status: SHIPPED | OUTPATIENT
Start: 2023-11-01 | End: 2024-04-29

## 2023-11-06 DIAGNOSIS — I50.32 CHRONIC HEART FAILURE WITH PRESERVED EJECTION FRACTION (HCC): ICD-10-CM

## 2023-11-07 RX ORDER — TORSEMIDE 20 MG/1
10 TABLET ORAL DAILY
Qty: 90 TABLET | Refills: 3 | Status: SHIPPED | OUTPATIENT
Start: 2023-11-07

## 2023-11-09 ENCOUNTER — OFFICE VISIT (OUTPATIENT)
Dept: URGENT CARE | Facility: MEDICAL CENTER | Age: 78
End: 2023-11-09
Payer: MEDICARE

## 2023-11-09 ENCOUNTER — APPOINTMENT (OUTPATIENT)
Dept: RADIOLOGY | Facility: MEDICAL CENTER | Age: 78
End: 2023-11-09
Attending: PHYSICIAN ASSISTANT
Payer: MEDICARE

## 2023-11-09 VITALS
HEIGHT: 70 IN | TEMPERATURE: 98.9 F | DIASTOLIC BLOOD PRESSURE: 74 MMHG | WEIGHT: 172 LBS | RESPIRATION RATE: 18 BRPM | OXYGEN SATURATION: 99 % | SYSTOLIC BLOOD PRESSURE: 122 MMHG | HEART RATE: 80 BPM | BODY MASS INDEX: 24.62 KG/M2

## 2023-11-09 DIAGNOSIS — R22.31 LOCALIZED SWELLING ON RIGHT HAND: Primary | ICD-10-CM

## 2023-11-09 DIAGNOSIS — R22.31 LOCALIZED SWELLING ON RIGHT HAND: ICD-10-CM

## 2023-11-09 PROCEDURE — 99213 OFFICE O/P EST LOW 20 MIN: CPT | Performed by: PHYSICIAN ASSISTANT

## 2023-11-09 PROCEDURE — 73130 X-RAY EXAM OF HAND: CPT

## 2023-11-09 PROCEDURE — G0463 HOSPITAL OUTPT CLINIC VISIT: HCPCS | Performed by: PHYSICIAN ASSISTANT

## 2023-11-09 RX ORDER — PREDNISONE 20 MG/1
20 TABLET ORAL DAILY
Qty: 5 TABLET | Refills: 0 | Status: SHIPPED | OUTPATIENT
Start: 2023-11-09 | End: 2023-11-14

## 2023-11-09 RX ORDER — SULFAMETHOXAZOLE AND TRIMETHOPRIM 800; 160 MG/1; MG/1
1 TABLET ORAL EVERY 12 HOURS SCHEDULED
Qty: 14 TABLET | Refills: 0 | Status: SHIPPED | OUTPATIENT
Start: 2023-11-09 | End: 2023-11-16

## 2023-11-09 RX ORDER — CEPHALEXIN 500 MG/1
500 CAPSULE ORAL EVERY 8 HOURS SCHEDULED
Qty: 21 CAPSULE | Refills: 0 | Status: SHIPPED | OUTPATIENT
Start: 2023-11-09 | End: 2023-11-09 | Stop reason: ALTCHOICE

## 2023-11-09 NOTE — PROGRESS NOTES
[facility]        NAME: Sergey Daniels is a 78 y.o. male  : 1945    MRN: [id number]  DATE: 2023  TIME: 1:51 PM    Assessment and Plan   Localized swelling on right hand [R22.31]  1. 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 Patient denies any fall, trauma or inciting events. He reports he was working in his flower beds 1 day prior to the onset of his hand pain. Patient denies any numbness, tingling or weakness into his hand but reports he has difficulty bending his fingers. Denies any systemic symptoms including fever, chills or body aches. Hand Pain   Pertinent negatives include no numbness. Review of Systems   Review of Systems   Constitutional: Negative. Musculoskeletal:  Positive for joint swelling and myalgias. Neurological:  Negative for weakness and numbness.          Current Medications       Current Outpatient Medications:     cephalexin (KEFLEX) 500 mg capsule, Take 1 capsule (500 mg total) by mouth every 8 (eight) hours for 7 days, Disp: 21 capsule, Rfl: 0    predniSONE 20 mg tablet, Take 1 tablet (20 mg total) by mouth daily for 5 days, Disp: 5 tablet, Rfl: 0    acetaminophen (TYLENOL) 325 mg tablet, Take 3 tablets (975 mg total) by mouth every 8 (eight) hours (Patient not taking: Reported on 6/13/2023), Disp: , Rfl: 0    allopurinol (ZYLOPRIM) 300 mg tablet, Take 1 tablet (300 mg total) by mouth daily, Disp: 90 tablet, Rfl: 3    amLODIPine (NORVASC) 10 mg tablet, TAKE 1 TABLET DAILY, Disp: 90 tablet, Rfl: 3    Ascorbic Acid (vitamin C) 100 MG tablet, Take 250 mg by mouth daily (Patient not taking: Reported on 10/12/2023), Disp: , Rfl:     aspirin (ECOTRIN LOW STRENGTH) 81 mg EC tablet, Take 81 mg by mouth daily, Disp: , Rfl:     atorvastatin (LIPITOR) 80 mg tablet, TAKE 1 TABLET DAILY AT     BEDTIME, Disp: 90 tablet, Rfl: 3    calcitriol (ROCALTROL) 0.25 mcg capsule, Take 1 capsule (0.25 mcg total) by mouth daily, Disp: 90 capsule, Rfl: 4    Cholecalciferol (Vitamin D3 Ultra Potency) 1.25 MG (23908 UT) TABS, Take 1 tablet (50,000 Units total) by mouth once a week (Patient not taking: Reported on 10/12/2023), Disp: 16 tablet, Rfl: 1    clopidogrel (PLAVIX) 75 mg tablet, Take 1 tablet (75 mg total) by mouth daily, Disp: 90 tablet, Rfl: 3    Cyanocobalamin (VITAMIN B12 PO), Take by mouth once a week (Patient not taking: Reported on 6/13/2023), Disp: , Rfl:     docusate sodium (COLACE) 100 mg capsule, Take 1 capsule (100 mg total) by mouth 2 (two) times a day (Patient not taking: Reported on 6/13/2023), Disp: , Rfl: 0    Empagliflozin (Jardiance) 10 MG TABS tablet, Take 1 tablet (10 mg total) by mouth in the morning (Patient not taking: Reported on 10/12/2023), Disp: 90 tablet, Rfl: 3    glimepiride (AMARYL) 1 mg tablet, Take 1 tablet (1 mg total) by mouth daily with breakfast, Disp: 90 tablet, Rfl: 1    glipiZIDE (GLUCOTROL) 5 mg tablet, Take 1 tablet (5 mg total) by mouth daily with breakfast (Patient not taking: Reported on 9/5/2023), Disp: 90 tablet, Rfl: 1    lisinopril (ZESTRIL) 20 mg tablet, Take 1 tablet (20 mg total) by mouth daily, Disp: 90 tablet, Rfl: 3    Magnesium Oxide (MAG-200 PO), Take by mouth once a week (Patient not taking: Reported on 6/13/2023), Disp: , Rfl:     metoprolol succinate (TOPROL-XL) 25 mg 24 hr tablet, Take 0.5 tablets (12.5 mg total) by mouth daily, Disp: 90 tablet, Rfl: 1    Multiple Vitamin (multivitamin) tablet, Take 1 tablet by mouth daily Takes when he can remember. (Patient not taking: Reported on 10/12/2023), Disp: , Rfl:     pancrelipase, Lip-Prot-Amyl, (CREON) 24,000 units, Take 24,000 units of lipase by mouth 3 (three) times a day with meals, Disp: 300 capsule, Rfl: 3    pantoprazole (PROTONIX) 40 mg tablet, Take 1 tablet (40 mg total) by mouth daily, Disp: 90 tablet, Rfl: 1    torsemide (DEMADEX) 20 mg tablet, TAKE 0.5 TABLETS (10 MG TOTAL) BY MOUTH DAILY TAKES 10 MG ONCE A DAY., Disp: 90 tablet, Rfl: 3    Current Allergies     Allergies as of 11/09/2023    (No Known Allergies)            The following portions of the patient's history were reviewed and updated as appropriate: allergies, current medications, past family history, past medical history, past social history, past surgical history and problem list.     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 ([address]) 2/24/2023    Basal cell carcinoma     right cheek    CAD (coronary artery disease)     Carotid stenosis, asymptomatic, bilateral     Chronic kidney disease     Colon polyp     Diabetes ([address])     type 2, non-insulin dependent    Diabetes mellitus ([address])     GERD (gastroesophageal reflux disease)     History of nephrolithiasis     Hyperlipidemia     Hypertension     Left foot pain 11/30/2022    PAD (peripheral artery disease) ([address])     Severe aortic stenosis     Squamous cell skin cancer 11/22/2022    left superior helix       Past Surgical History:   Procedure Laterality Date    APPENDECTOMY      CARDIAC CATHETERIZATION N/A 05/05/2022    Procedure: CARDIAC RHC/LHC;   Surgeon: Lionel Emmanuel DO;  Location: [facility]; Service: Cardiology    CARDIAC CATHETERIZATION N/A 05/05/2022    Procedure: Cardiac Coronary Angiogram;  Surgeon: Lionel Emmanuel DO;  Location: [facility]; Service: Cardiology    CARDIAC CATHETERIZATION N/A 05/24/2022    Procedure: Cardiac pci;  Surgeon: Amber Batista MD;  Location: [facility];   Service: Cardiology    CARDIAC CATHETERIZATION N/A 06/14/2022    Procedure: CARDIAC TAVR;  Surgeon: Debbie Parish MD;  Location: [facility];  Service: Cardiology    COLECTOMY      COLONOSCOPY  2013    CORONARY ARTERY BYPASS GRAFT  2013    X 2    FEMORAL ARTERY - POPLITEAL ARTERY BYPASS GRAFT      HEMORRHOID SURGERY      IR AORTAGRAM WITH RUN-OFF  11/19/2018    IR AORTAGRAM WITH RUN-OFF  3/2/2023    IR LOWER EXTREMITY ANGIOGRAM  3/23/2023    MOHS SURGERY Left 01/11/2023    SCC left superior helix-Dr Tovar    SC SLCTV CATHJ 3RD+ ORD SLCTV ABDL PEL[facility]/12/2016    Procedure: LEFT FEMORAL ARTERIOGRAM; BALLOON ANGIOPLASTY; SFA  AND FEMORAL AT VEIN GRAFT;  Surgeon: Donna Perez MD;  Location: [facility];  Service: Vascular    SC TEAEC W/WO PATCH GRAFT COMMON FEMORAL Right 3/23/2023    Procedure: Common femoral endarterectomy&antegrade intervention of SFA, popliteal artery w/ shockwave;  Surgeon: Yenny Saunders MD;  Location: [facility];  Service: Vascular    SC TRANSCATHETER TRANSAPICAL REPLACEMT AORTIC VALVE N/A 06/14/2022    Procedure: REPLACEMENT AORTIC VALVE TRANSCATHETER (TAVR) TRANSAPICAL 29MM IRVIN KYLER S3 ULTRA VALVE(ACCESS ON LEFT) ELANA;  Surgeon: Nnamdi Mera DO;  Location: [facility];  Service: Cardiac Surgery    SKIN CANCER EXCISION      TONSILLECTOMY AND ADENOIDECTOMY         Family History   Problem Relation Age of Onset    Heart attack Father     Other Sister         bypass and vlave replacement    Stroke Paternal Uncle     Arrhythmia Neg Hx     Asthma Neg Hx     Clotting disorder Neg Hx     Fainting Neg Hx     Anuerysm Neg Hx     Hypertension Neg Hx         unsure     Hyperlipidemia Neg Hx     Heart failure Neg Hx          Medications have been verified. Objective   /74   Pulse 80   Temp 98.9 °F (37.2 °C)   Resp 18   Ht 5' 10" (1.778 m)   Wt 78 kg (172 lb)   SpO2 99%   BMI 24.68 kg/m²   No LMP for male patient. Physical Exam     Physical Exam  Constitutional:       General: He is not in acute distress. Appearance: Normal appearance. He is not ill-appearing. Cardiovascular:      Rate and Rhythm: Normal rate and regular rhythm. Heart sounds: Normal heart sounds, S1 normal and S2 normal. No murmur heard. Pulmonary:      Effort: Pulmonary effort is normal.      Breath sounds: Normal breath sounds and air entry. Musculoskeletal:      Right hand: Swelling and tenderness present. No deformity. Decreased range of motion. Decreased strength of finger abduction, thumb/finger opposition and wrist extension. Normal sensation. There is no disruption of two-point discrimination. Normal capillary refill. Neurological:      Mental Status: He is alert. Review of the hand x-ray revealed no acute fractures or dislocation but significant arthritic changes throughout the fingers and carpal bones. Final results as per radiology review. Received phone call from [facility] due to concern about Keflex prescription and stage IV kidney disease. D/C Keflex and start Bactrim DS 1 tablet twice daily for 7 days. New prescription was sent to [facility] pharmacy in [facility].

## 2023-11-15 ENCOUNTER — OFFICE VISIT (OUTPATIENT)
Dept: CARDIOLOGY CLINIC | Facility: MEDICAL CENTER | Age: 78
End: 2023-11-15
Payer: MEDICARE

## 2023-11-15 VITALS
DIASTOLIC BLOOD PRESSURE: 60 MMHG | BODY MASS INDEX: 24.62 KG/M2 | HEART RATE: 71 BPM | HEIGHT: 70 IN | OXYGEN SATURATION: 100 % | WEIGHT: 172 LBS | SYSTOLIC BLOOD PRESSURE: 160 MMHG

## 2023-11-15 DIAGNOSIS — I12.9 BENIGN HYPERTENSION WITH CHRONIC KIDNEY DISEASE, STAGE IV (HCC): ICD-10-CM

## 2023-11-15 DIAGNOSIS — Z95.1 S/P CABG (CORONARY ARTERY BYPASS GRAFT): Chronic | ICD-10-CM

## 2023-11-15 DIAGNOSIS — I70.213 ATHEROSCLEROSIS OF NATIVE ARTERIES OF EXTREMITIES WITH INTERMITTENT CLAUDICATION, BILATERAL LEGS (HCC): Primary | ICD-10-CM

## 2023-11-15 DIAGNOSIS — R00.1 SINUS BRADYCARDIA: ICD-10-CM

## 2023-11-15 DIAGNOSIS — I25.10 CORONARY ARTERY DISEASE INVOLVING NATIVE CORONARY ARTERY OF NATIVE HEART WITHOUT ANGINA PECTORIS: ICD-10-CM

## 2023-11-15 DIAGNOSIS — E78.2 MIXED HYPERLIPIDEMIA: ICD-10-CM

## 2023-11-15 DIAGNOSIS — Z95.2 S/P TAVR (TRANSCATHETER AORTIC VALVE REPLACEMENT): ICD-10-CM

## 2023-11-15 DIAGNOSIS — I10 PRIMARY HYPERTENSION: ICD-10-CM

## 2023-11-15 DIAGNOSIS — I50.32 CHRONIC HEART FAILURE WITH PRESERVED EJECTION FRACTION (HCC): ICD-10-CM

## 2023-11-15 DIAGNOSIS — N18.4 BENIGN HYPERTENSION WITH CHRONIC KIDNEY DISEASE, STAGE IV (HCC): ICD-10-CM

## 2023-11-15 PROCEDURE — 99214 OFFICE O/P EST MOD 30 MIN: CPT | Performed by: INTERNAL MEDICINE

## 2023-11-15 NOTE — PROGRESS NOTES
Cardiology   Tarah Mejía. 78 y.o. male MRN: [id number]      Impression:  1. Coronary artery disease s/p CABG - s/p recent PCI of RCA  2. Hypertension - not adequate control. Usually better controlled. Was on prednisone for tendonitis - stopped yesterday. 3. Severe bilateral carotid disease - followed by vascular surgery. 4. Dyslipidemia - doing well. 5. Peripheral arterial disease - s/p LLE revascularization and RLE revascularization 3/23. 6. Aortic stenosis - s/p TAVR 6/22  7. Bradycardia - on low-dose b-blockers. 8. CKD - GFR 24. Recommendations:  1. Continue current medications. 2. Check BP at home 2x/week. If BP > 140, call office. 2. Continue remainder of medication. 4. Follow up in 4 months. HPI: Tarah Mejía. is a 78y.o. year old male with coronary artery disease s/p CABG with PCI of RCA 5/22, AS s.o TAVR 6/22, HTN, Dyslipidemia and LLE revascularization who returns for follow up. No chest pain, shortness of breath, or palpitations. Stress test 8/22 - no ischemia. Echo 7/20 - normal cardiac function with normal TAVR. Does have headache. Underwent RLE revascularization 3/23. Echo 6/23 - EF 65%, normal TAVR        Review of Systems   Constitutional: Negative. HENT: Negative. Eyes: Negative. Respiratory:  Negative for chest tightness and shortness of breath. Cardiovascular:  Negative for chest pain, palpitations and leg swelling. Gastrointestinal: Negative. Endocrine: Negative. Genitourinary: Negative. Musculoskeletal:  Positive for arthralgias. Skin: Negative. Allergic/Immunologic: Negative. Neurological: Negative. Hematological: Negative. Psychiatric/Behavioral: Negative. All other systems reviewed and are negative.         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 ([address]) 2/24/2023    Basal cell carcinoma     right cheek    CAD (coronary artery disease)     Carotid stenosis, asymptomatic, bilateral     Chronic kidney disease     Colon polyp     Diabetes ([address])     type 2, non-insulin dependent    Diabetes mellitus (HCC)     GERD (gastroesophageal reflux disease)     History of nephrolithiasis     Hyperlipidemia     Hypertension     Left foot pain 11/30/2022    PAD (peripheral artery disease) (HCC)     Severe aortic stenosis     Squamous cell skin cancer 11/22/2022    left superior helix     Past Surgical History:   Procedure Laterality Date    APPENDECTOMY      CARDIAC CATHETERIZATION N/A 05/05/2022    Procedure: CARDIAC RHC/LHC; Surgeon: Ryanne Padilla DO;  [facility]; Service: Cardiology    CARDIAC CATHETERIZATION N/A 05/05/2022    Procedure: Cardiac Coronary Angiogram;  Surgeon: Ryanne Padilla DO;  [facility]; Service: Cardiology    CARDIAC CATHETERIZATION N/A 05/24/2022    Procedure: Cardiac pci;  Surgeon: Gabriel Miller MD;  [facility];   Service: Cardiology    CARDIAC CATHETERIZATION N/A 06/14/2022    Procedure: CARDIAC TAVR;  Surgeon: Vaughn Pat MD;  Location: [facility];  Service: Cardiology    COLECTOMY      COLONOSCOPY  2013    CORONARY ARTERY BYPASS GRAFT  2013    X 2    FEMORAL ARTERY - POPLITEAL ARTERY BYPASS GRAFT      HEMORRHOID SURGERY      IR AORTAGRAM WITH RUN-OFF  11/19/2018    IR AORTAGRAM WITH RUN-OFF  3/2/2023    IR LOWER EXTREMITY ANGIOGRAM  3/23/2023    MOHS SURGERY Left 01/11/2023    SCC left superior helix-Dr Tovar    IN SLCTV CATHJ 3RD+ ORD SLCTV ABDL PEL/LXTR [facility] Left 08/12/2016    Procedure: LEFT FEMORAL ARTERIOGRAM; BALLOON ANGIOPLASTY; SFA  AND FEMORAL AT VEIN GRAFT;  Surgeon: Blima Lanes, MD;  Location: [facility];  Service: Vascular    IN TEAEC W/WO PATCH GRAFT COMMON FEMORAL Right 3/23/2023    Procedure: Common femoral endarterectomy&antegrade intervention of SFA, popliteal artery w/ shockwave;  Surgeon: Nain Houser MD;  Location: [facility];  Service: Vascular    NH TRANSCATHETER TRANSAPICAL REPLACEMT AORTIC VALVE N/A 06/14/2022    Procedure: REPLACEMENT AORTIC VALVE TRANSCATHETER (TAVR) TRANSAPICAL 29MM IRVIN KYLER S3 ULTRA VALVE(ACCESS ON LEFT) LEANA;  Surgeon: Raquel Williamson DO;  Location: [facility];  Service: Cardiac Surgery    SKIN CANCER EXCISION      TONSILLECTOMY AND ADENOIDECTOMY       Social History     Substance and Sexual Activity   Alcohol Use Not Currently    Comment: rare     Social History     Substance and Sexual Activity   Drug Use No     Social History     Tobacco Use   Smoking Status Some Days    Packs/day: 0.25    Years: 50.00    Total pack years: 12.50    Types: Cigarettes   Smokeless Tobacco Never     Family History   Problem Relation Age of Onset    Heart attack Father     Other Sister         bypass and vlave replacement    Stroke Paternal Uncle     Arrhythmia Neg Hx     Asthma Neg Hx     Clotting disorder Neg Hx     Fainting Neg Hx     Anuerysm Neg Hx     Hypertension Neg Hx         unsure     Hyperlipidemia Neg Hx     Heart failure Neg Hx        Allergies:  No Known Allergies    Medications:     Current Outpatient Medications:     allopurinol (ZYLOPRIM) 300 mg tablet, Take 1 tablet (300 mg total) by mouth daily, Disp: 90 tablet, Rfl: 3    amLODIPine (NORVASC) 10 mg tablet, TAKE 1 TABLET DAILY, Disp: 90 tablet, Rfl: 3    aspirin (ECOTRIN LOW STRENGTH) 81 mg EC tablet, Take 81 mg by mouth daily, Disp: , Rfl:     atorvastatin (LIPITOR) 80 mg tablet, TAKE 1 TABLET DAILY AT     BEDTIME, Disp: 90 tablet, Rfl: 3    clopidogrel (PLAVIX) 75 mg tablet, Take 1 tablet (75 mg total) by mouth daily, Disp: 90 tablet, Rfl: 3    glimepiride (AMARYL) 1 mg tablet, Take 1 tablet (1 mg total) by mouth daily with breakfast, Disp: 90 tablet, Rfl: 1    lisinopril (ZESTRIL) 20 mg tablet, Take 1 tablet (20 mg total) by mouth daily, Disp: 90 tablet, Rfl: 3    metoprolol succinate (TOPROL-XL) 25 mg 24 hr tablet, Take 0.5 tablets (12.5 mg total) by mouth daily, Disp: 90 tablet, Rfl: 1    pancrelipase, Lip-Prot-Amyl, (CREON) 24,000 units, Take 24,000 units of lipase by mouth 3 (three) times a day with meals, Disp: 300 capsule, Rfl: 3    pantoprazole (PROTONIX) 40 mg tablet, Take 1 tablet (40 mg total) by mouth daily, Disp: 90 tablet, Rfl: 1    sulfamethoxazole-trimethoprim (BACTRIM DS) 800-160 mg per tablet, Take 1 tablet by mouth every 12 (twelve) hours for 7 days, Disp: 14 tablet, Rfl: 0    torsemide (DEMADEX) 20 mg tablet, TAKE 0.5 TABLETS (10 MG TOTAL) BY MOUTH DAILY TAKES 10 MG ONCE A DAY., Disp: 90 tablet, Rfl: 3    acetaminophen (TYLENOL) 325 mg tablet, Take 3 tablets (975 mg total) by mouth every 8 (eight) hours (Patient not taking: Reported on 6/13/2023), Disp: , Rfl: 0    Ascorbic Acid (vitamin C) 100 MG tablet, Take 250 mg by mouth daily (Patient not taking: Reported on 10/12/2023), Disp: , Rfl:     calcitriol (ROCALTROL) 0.25 mcg capsule, Take 1 capsule (0.25 mcg total) by mouth daily, Disp: 90 capsule, Rfl: 4    Cholecalciferol (Vitamin D3 Ultra Potency) 1.25 MG (33990 UT) TABS, Take 1 tablet (50,000 Units total) by mouth once a week (Patient not taking: Reported on 10/12/2023), Disp: 16 tablet, Rfl: 1    Cyanocobalamin (VITAMIN B12 PO), Take by mouth once a week (Patient not taking: Reported on 6/13/2023), Disp: , Rfl:     docusate sodium (COLACE) 100 mg capsule, Take 1 capsule (100 mg total) by mouth 2 (two) times a day (Patient not taking: Reported on 6/13/2023), Disp: , Rfl: 0    Empagliflozin (Jardiance) 10 MG TABS tablet, Take 1 tablet (10 mg total) by mouth in the morning (Patient not taking: Reported on 10/12/2023), Disp: 90 tablet, Rfl: 3    glipiZIDE (GLUCOTROL) 5 mg tablet, Take 1 tablet (5 mg total) by mouth daily with breakfast (Patient not taking: Reported on 9/5/2023), Disp: 90 tablet, Rfl: 1 Magnesium Oxide (MAG-200 PO), Take by mouth once a week (Patient not taking: Reported on 6/13/2023), Disp: , Rfl:     Multiple Vitamin (multivitamin) tablet, Take 1 tablet by mouth daily Takes when he can remember. (Patient not taking: Reported on 10/12/2023), Disp: , Rfl:       Wt Readings from Last 3 Encounters:   11/15/23 78 kg (172 lb)   11/09/23 78 kg (172 lb)   10/12/23 78 kg (172 lb)     Temp Readings from Last 3 Encounters:   11/09/23 98.9 °F (37.2 °C)   10/12/23 97.8 °F (36.6 °C) (Temporal)   08/02/23 (!) 97.4 °F (36.3 °C)     BP Readings from Last 3 Encounters:   11/15/23 160/60   11/09/23 122/74   09/05/23 130/50     Pulse Readings from Last 3 Encounters:   11/15/23 71   11/09/23 80   09/05/23 55         Physical Exam  HENT:      Head: Atraumatic. Mouth/Throat:      Mouth: Mucous membranes are moist.   Eyes:      Extraocular Movements: Extraocular movements intact. Cardiovascular:      Rate and Rhythm: Normal rate and regular rhythm. Heart sounds: Normal heart sounds. Pulmonary:      Effort: Pulmonary effort is normal.      Breath sounds: Normal breath sounds. Abdominal:      General: Abdomen is flat. Musculoskeletal:         General: Normal range of motion. Cervical back: Normal range of motion. Skin:     General: Skin is warm. Neurological:      General: No focal deficit present. Mental Status: He is alert and oriented to person, place, and time.    Psychiatric:         Mood and Affect: Mood normal.         Behavior: Behavior normal.           Laboratory Studies:  CMP:  Lab Results   Component Value Date     11/22/2015    K 4.2 08/31/2023     08/31/2023    CO2 25 08/31/2023    ANIONGAP 7 11/22/2015    BUN 42 (H) 08/31/2023    CREATININE 2.26 (H) 08/31/2023    GLUCOSE 163 (H) 03/23/2023    AST 19 08/31/2023    ALT 9 08/31/2023    EGFR 26 08/31/2023       Lipid Profile:   No results found for: "CHOL"  Lab Results   Component Value Date    HDL 52 05/05/2022 Lab Results   Component Value Date    LDLCALC 33 2022     Lab Results   Component Value Date    TRIG 50 2022       Cardiac testing:   EKG reviewed personally:   Results for orders placed during the hospital encounter of 21    Echo complete with contrast if indicated    Narrative  [address], [address] [facility]  [phone]    Transthoracic Echocardiogram  2D, M-mode, Doppler, and Color Doppler    Study date:  06-May-2021    Patient: Marvin Whitehead  MR number: [de-identified]  Account number: [de-identified]  : [id number]  Age: 64 years  Gender: Male  Status: Outpatient  Location: [address] [place] [address]  Height: 70 in  Weight: 181 lb  BP: 150/ 58 mmHg    Indications: Assess the aortic valve. Diagnoses: I35.0 - Nonrheumatic aortic (valve) stenosis    Sonographer:  Miguel Castelan RDCS  Primary Physician:  Shaw Graff MD  Referring Physician:  Irina Valadez MD  Group:  [employer] [facility]  Interpreting Physician:  Maria Del Carmen Mejia MD    SUMMARY    LEFT VENTRICLE:  Systolic function was normal. Ejection fraction was estimated to be 60 %. There were no regional wall motion abnormalities. Wall thickness was mildly increased. Doppler parameters were consistent with abnormal left ventricular relaxation (grade 1 diastolic dysfunction). LEFT ATRIUM:  The atrium was mildly dilated. MITRAL VALVE:  There was mild regurgitation. AORTIC VALVE:  The valve was probably trileaflet. Leaflets exhibited moderately increased thickness and moderate calcification. There was severe stenosis. There was trace regurgitation. Valve peak gradient was 63 mmHg. Valve mean gradient was 32 mmHg. Aortic valve area was 1 cmï¾² by the continuity equation. Estimated aortic valve area (by VTI) was 0.91 cmï¾². TRICUSPID VALVE:  There was trace regurgitation.     HISTORY: PRIOR HISTORY: CAD, bradycardia, DM, dyslipidemia, PAD, CABG,    PROCEDURE: The study was performed in the [address] [place] [id number]. This was a routine study. The transthoracic approach was used. The study included complete 2D imaging, M-mode, complete spectral Doppler, and color Doppler. The heart rate was  66 bpm, at the start of the study. Images were obtained from the parasternal, apical, subcostal, and suprasternal notch acoustic windows. Image quality was adequate. LEFT VENTRICLE: Size was normal. Systolic function was normal. Ejection fraction was estimated to be 60 %. There were no regional wall motion abnormalities. Wall thickness was mildly increased. DOPPLER: Doppler parameters were consistent  with abnormal left ventricular relaxation (grade 1 diastolic dysfunction). RIGHT VENTRICLE: The size was normal. Systolic function was normal. Wall thickness was normal.    LEFT ATRIUM: The atrium was mildly dilated. RIGHT ATRIUM: Size was normal.    MITRAL VALVE: Valve structure was normal. There was normal leaflet separation. DOPPLER: The transmitral velocity was within the normal range. There was no evidence for stenosis. There was mild regurgitation. AORTIC VALVE: The valve was probably trileaflet. Leaflets exhibited moderately increased thickness and moderate calcification. DOPPLER: There was severe stenosis. There was trace regurgitation. TRICUSPID VALVE: The valve structure was normal. There was normal leaflet separation. DOPPLER: The transtricuspid velocity was within the normal range. There was no evidence for stenosis. There was trace regurgitation. PULMONIC VALVE: Leaflets exhibited normal thickness, no calcification, and normal cuspal separation. DOPPLER: The transpulmonic velocity was within the normal range. There was no significant regurgitation. PERICARDIUM: There was no pericardial effusion. The pericardium was normal in appearance. AORTA: The root exhibited normal size. SYSTEMIC VEINS: IVC: The inferior vena cava was normal in size.     MEASUREMENT TABLES    2D MEASUREMENTS  LVOT   (Reference normals)  Diam   23 mm   (--)    DOPPLER MEASUREMENTS  LVOT   (Reference normals)  VTI   24 cm   (--)  R-R interval   1017 ms   (--)  HR   59 bpm   (--)  Stroke vol   99.71 ml   (--)  Cardiac output   5.88 L/min   (--)  Cardiac index   2.94 L/min/mï¾²   (--)  Aortic valve   (Reference normals)  VTI   108 cm   (--)  Peak gradient   63 mmHg   (--)  Mean gradient   32 mmHg   (--)  Valve area, cont   1 cmï¾²   (--)  Obstr index, VTI   0.22    (--)  Valve area, VTI   0.91 cmï¾²   (--)  Area index, VTI   0.46 cmï¾²/mï¾²   (--)    SYSTEM MEASUREMENT TABLES    2D  %FS: 23.52 %  Ao Diam: 3.06 cm  EDV(Teich): 137.67 ml  EF(Teich): 46.6 %  ESV(Teich): 73.51 ml  IVSd: 1.26 cm  LA Area: 24.18 cm2  LA Diam: 4.19 cm  LVEDV MOD A4C: 198.32 ml  LVEF MOD A4C: 54.08 %  LVESV MOD A4C: 91.07 ml  LVIDd: 5.34 cm  LVIDs: 4.08 cm  LVLd A4C: 10.15 cm  LVLs A4C: 8.98 cm  LVOT Diam: 2.43 cm  LVPWd: 1.18 cm  RA Area: 14.06 cm2  RVIDd: 3.75 cm  SV MOD A4C: 107.24 ml  SV(Teich): 64.16 ml    CW  AV Env. Ti: 414.84 ms  AV MaxP.54 mmHg  AV VTI: 103.91 cm  AV Vmax: 3.82 m/s  AV Vmean: 2.5 m/s  AV meanP.08 mmHg    MM  TAPSE: 2.13 cm    PW  LUIS (VTI): 1.06 cm2  LUIS Vmax: 1.04 cm2  E' Sept: 0.04 m/s  E/E' Sept: 22.83  LVOT Env. Ti: 482.08 ms  LVOT VTI: 23.7 cm  LVOT Vmax: 0.86 m/s  LVOT Vmean: 0.5 m/s  LVOT maxP.94 mmHg  LVOT meanP.25 mmHg  LVSV Dopp: 110.25 ml  MV A Pop: 1.23 m/s  MV Dec Barber: 5.05 m/s2  MV DecT: 180.67 ms  MV E Pop: 0.91 m/s  MV E/A Ratio: 0.74  MV PHT: 52.39 ms  MVA By PHT: 4.2 cm2    [facility] Accredited Echocardi[facility]    Prepared and electronically signed by    Delgado Lu MD  Signed 06-May-2021 16:40:31    No results found for this or any previous visit. No results found for this or any previous visit. No results found for this or any previous visit.

## 2023-11-15 NOTE — PATIENT INSTRUCTIONS
Recommendations:  1. Continue current medications. 2. Check BP at home 2x/week. If BP > 140, call office. 2. Continue remainder of medication. 4. Follow up in 4 months.

## 2023-11-16 ENCOUNTER — OFFICE VISIT (OUTPATIENT)
Dept: OBGYN CLINIC | Facility: MEDICAL CENTER | Age: 78
End: 2023-11-16
Payer: MEDICARE

## 2023-11-16 VITALS
SYSTOLIC BLOOD PRESSURE: 149 MMHG | WEIGHT: 172 LBS | BODY MASS INDEX: 24.62 KG/M2 | HEIGHT: 70 IN | DIASTOLIC BLOOD PRESSURE: 50 MMHG | HEART RATE: 66 BPM

## 2023-11-16 DIAGNOSIS — M25.572 PAIN, JOINT, ANKLE AND FOOT, LEFT: ICD-10-CM

## 2023-11-16 DIAGNOSIS — M10.9 GOUT OF LEFT FOOT, UNSPECIFIED CAUSE, UNSPECIFIED CHRONICITY: ICD-10-CM

## 2023-11-16 DIAGNOSIS — R22.31 LOCALIZED SWELLING ON RIGHT HAND: ICD-10-CM

## 2023-11-16 DIAGNOSIS — M77.8 TENDINITIS OF THUMB: Primary | ICD-10-CM

## 2023-11-16 DIAGNOSIS — E55.9 VITAMIN D DEFICIENCY: ICD-10-CM

## 2023-11-16 DIAGNOSIS — E11.22 CONTROLLED TYPE 2 DIABETES MELLITUS WITH STAGE 4 CHRONIC KIDNEY DISEASE, WITHOUT LONG-TERM CURRENT USE OF INSULIN (HCC): ICD-10-CM

## 2023-11-16 DIAGNOSIS — N18.4 CONTROLLED TYPE 2 DIABETES MELLITUS WITH STAGE 4 CHRONIC KIDNEY DISEASE, WITHOUT LONG-TERM CURRENT USE OF INSULIN (HCC): ICD-10-CM

## 2023-11-16 DIAGNOSIS — M18.11 ARTHRITIS OF CARPOMETACARPAL (CMC) JOINT OF RIGHT THUMB: ICD-10-CM

## 2023-11-16 PROCEDURE — 99213 OFFICE O/P EST LOW 20 MIN: CPT | Performed by: ORTHOPAEDIC SURGERY

## 2023-11-16 RX ORDER — METHOCARBAMOL 750 MG/1
50000 TABLET ORAL WEEKLY
Qty: 16 CAPSULE | Refills: 1 | Status: SHIPPED | OUTPATIENT
Start: 2023-11-16

## 2023-11-16 RX ORDER — GLIPIZIDE 5 MG/1
5 TABLET ORAL
Qty: 90 TABLET | Refills: 1 | Status: SHIPPED | OUTPATIENT
Start: 2023-11-16

## 2023-11-16 RX ORDER — ALLOPURINOL 300 MG/1
300 TABLET ORAL DAILY
Qty: 90 TABLET | Refills: 1 | Status: SHIPPED | OUTPATIENT
Start: 2023-11-16

## 2023-11-16 NOTE — PROGRESS NOTES
The HAND & UPPER EXTREMITY OFFICE VISIT   Referred By:  Tonya Gonzalez Pa-c  [address],  [address] [facility]      Chief Complaint:     Right hand swelling     History of Present Illness:   78 y.o., right hand dominant male presents with right hand pain and swelling. He states last Tuesday he had some welling to his right hand and he was unable to move his fingers much. He did present to Urgent Care, at which time x-rays were performed and he was provided with oral ABX and Prednisone. He did not take the oral ABX. He did finish the Prednisone and states his symptoms have significantly improve. He notes mild pain to his right thumb. He states he is now able to perform all activities. He states his symptoms have improved at least 90 percent. ADLs: Community ambulator with cane   Smoke: Yes   ETOH: Not currently    Drugs:  No  Job: retired dentist        Past Medical History:  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 ([address]) 2/24/2023    Basal cell carcinoma     right cheek    CAD (coronary artery disease)     Carotid stenosis, asymptomatic, bilateral     Chronic kidney disease     Colon polyp     Diabetes ([address])     type 2, non-insulin dependent    Diabetes mellitus (HCC)     GERD (gastroesophageal reflux disease)     History of nephrolithiasis     Hyperlipidemia     Hypertension     Left foot pain 11/30/2022    PAD (peripheral artery disease) (HCC)     Severe aortic stenosis     Squamous cell skin cancer 11/22/2022    left superior helix     Past Surgical History:   Procedure Laterality Date    APPENDECTOMY      CARDIAC CATHETERIZATION N/A 05/05/2022    Procedure: CARDIAC RHC/LHC; Surgeon: Petey Guerrero DO;  [facility];   Service: Cardiology    CARDIAC CATHETERIZATION N/A 05/05/2022 Procedure: Cardiac Coronary Angiogram;  Surgeon: Dru Smith DO;  [facility]; Service: Cardiology    CARDIAC CATHETERIZATION N/A 05/24/2022    Procedure: Cardiac pci;  Surgeon: Conner Diana MD;  [facility]: [facility];   Service: Cardiology    CARDIAC CATHETERIZATION N/A 06/14/2022    Procedure: CARDIAC TAVR;  Surgeon: Kat Sanchez MD;  Location: [facility] MAIN [facility];  Service: Cardiology    COLECTOMY      COLONOSCOPY  2013    CORONARY ARTERY BYPASS GRAFT  2013    X 2    FEMORAL ARTERY - POPLITEAL ARTERY BYPASS GRAFT      HEMORRHOID SURGERY      IR AORTAGRAM WITH RUN-OFF  11/19/2018    IR AORTAGRAM WITH RUN-OFF  3/2/2023    IR LOWER EXTREMITY ANGIOGRAM  3/23/2023    MOHS SURGERY Left 01/11/2023    SCC left superior helix-Dr Guy    [place] SLCTV CATHJ 3RD+ ORD SLCTV ABDL PEL/LXTR [facility] Left 08/12/2016    Procedure: LEFT FEMORAL ARTERIOGRAM; BALLOON ANGIOPLASTY; SFA  AND FEMORAL AT VEIN GRAFT;  Surgeon: Germain Coley MD;  Location: [facility] MAIN OR;  Service: Vascular    [place] TEAEC W/WO PATCH GRAFT COMMON FEMORAL Right 3/23/2023    Procedure: Common femoral endarterectomy&antegrade intervention of SFA, popliteal artery w/ shockwave;  Surgeon: Dilip Larose MD;  Location[facility];  Service: Vascular    [place] TRANSCATHETER TRANSAPICAL REPLACEMT AORTIC VALVE N/A 06/14/2022    Procedure: REPLACEMENT AORTIC VALVE TRANSCATHETER (TAVR) TRANSAPICAL 29MM IRVIN KYLER S3 ULTRA VALVE(ACCESS ON LEFT) ELANA;  Surgeon: Do Juarez DO;  Location: [facility] MA[facility] OR;  Service: Cardiac Surgery    SKIN CANCER EXCISION      TONSILLECTOMY AND ADENOIDECTOMY       Family History   Problem Relation Age of Onset    Heart attack Father     Other Sister         bypass and vlave replacement    Stroke Paternal Uncle     Arrhythmia Neg Hx     Asthma Neg Hx     Clotting disorder Neg Hx     Fainting Neg Hx     Anuerysm Neg Hx     Hypertension Neg Hx         unsure     Hyperlipidemia Neg Hx     Heart failure Neg Hx Social History     Socioeconomic History    Marital status:      Spouse name: Not on file    Number of children: Not on file    Years of education: Not on file    Highest education level: Not on file   Occupational History    Not on file   Tobacco Use    Smoking status: Some Days     Packs/day: 0.25     Years: 50.00     Total pack years: 12.50     Types: Cigarettes    Smokeless tobacco: Never   Vaping Use    Vaping Use: Never used   Substance and Sexual Activity    Alcohol use: Not Currently     Comment: rare    Drug use: No    Sexual activity: Not on file   Other Topics Concern    Not on file   Social History Narrative    · Most recent tobacco use screenin2018      · Do you currently or have you served in the [address] [place] [address] [employer]: Yes      · If Yes, What branch of service:   [employer]      · Occupation:   Dentists      · Exercise level:   Occasional        · Caffeine intake:   Heavy      · Marital status:         · Diet:   Regular  low fat     · Seat belts used routinely:   Yes      · Smoke alarm in home: Yes      · Advance directive: Yes      · General stress level:   Low      Social Determinants of Health     Financial Resource Strain: Low Risk  (2023)    Overall Financial Resource Strain (CARDIA)     Difficulty of Paying Living Expenses: Not very hard   Food Insecurity: Unknown (3/24/2023)    Hunger Vital Sign     Worried About Running Out of Food in the Last Year: Never true     Ran Out of Food in the Last Year: Not on file   Transportation Needs: No Transportation Needs (2023)    PRAPARE - Transportation     Lack of Transportation (Medical): No     Lack of Transportation (Non-Medical):  No   Physical Activity: Not on file   Stress: Not on file   Social Connections: Not on file   Intimate Partner Violence: Not on file   Housing Stability: Low Risk  (3/24/2023)    Housing Stability Vital Sign     Unable to Pay for Housing in the Last Year: No     Number of Places Lived in the Last Year: 1     Unstable Housing in the Last Year: No     Scheduled Meds:  Continuous Infusions:No current facility-administered medications for this visit. PRN Meds:. No Known Allergies        Physical Examination:    /50   Pulse 66   Ht 5' 10" (1.778 m)   Wt 78 kg (172 lb)   BMI 24.68 kg/m²     Gen: A&Ox3, NAD  Cardiac: regular rate  Chest: non labored breathing  Abdomen: Non-distended    Right Upper Extremity:  Skin CDI  No obvious deformity of the shoulder, arm, elbow, forearm, wrist, hand  Sensation intact to light touch in the axillary median, ulnar, and radial nerve distributions  2+RP  Mild thenar swelling   Mild CMC joint tenderness  - CMC grind  Mild tenderness over the first dorsal compartment of the wrist and the FCR tendon. Studies:  Radiographs: I personally reviewed and independently interpreted the available radiographs. 11/9/2023: Radiographs of the right hand, multiple views, demonstrate significant arthritic changes at the thumb [facility] joint. No other fractures or dislocations. Mild arthritic changes throughout the remainder of the hand and wrist..       Assessment and Plan:  1. Tendinitis of thumb  Ambulatory Referral to PT/OT Hand Therapy      2. Arthritis of carpometacarpal [place]) joint of right thumb  Ambulatory Referral to PT/OT Hand Therapy      3. Localized swelling on right hand  Ambulatory Referral to Hand Surgery    Ambulatory Referral to PT/OT Hand Therapy      4. Pain, joint, ankle and foot, left  Ambulatory Referral to Podiatry          78 y.o. male presents with signs and symptoms consistent with the above diagnosis. We discussed the natural history of this condition and its pathogenesis. We discussed operative and nonoperative treatment options. Overall Braydon Slaughter is doing well. His symptoms improved approx. 90 percent with oral steroids. He will start OT for edema control and tendon glides, script was provided.  He was also provided with a referral to podiatry for left foot/ankle pain. He may gradually resume activities to his tolerance, no restrictions. I will see him back in the office as needed if symptoms worsen or fail to improve. he expressed understanding of the plan and agreed. We encouraged them to contact our office with any questions or concerns. Scribe Attestation      I,:  Quentin Davis am acting as a scribe while in the presence of the attending physician.:       I,:  Vaughn Caballero MD personally performed the services described in this documentation    as scribed in my presence.:               Neeta Lyon MD  Hand and Upper Extremity Surgery        *This note was dictated using Dragon voice recognition software. Please excuse any word substitutions or errors. *

## 2023-11-17 ENCOUNTER — OFFICE VISIT (OUTPATIENT)
Dept: FAMILY MEDICINE CLINIC | Facility: CLINIC | Age: 78
End: 2023-11-17
Payer: MEDICARE

## 2023-11-17 VITALS
RESPIRATION RATE: 18 BRPM | BODY MASS INDEX: 24.77 KG/M2 | SYSTOLIC BLOOD PRESSURE: 120 MMHG | HEART RATE: 66 BPM | TEMPERATURE: 98 F | DIASTOLIC BLOOD PRESSURE: 50 MMHG | OXYGEN SATURATION: 97 % | WEIGHT: 173 LBS | HEIGHT: 70 IN

## 2023-11-17 DIAGNOSIS — I10 PRIMARY HYPERTENSION: ICD-10-CM

## 2023-11-17 DIAGNOSIS — I25.10 CORONARY ARTERY DISEASE INVOLVING NATIVE CORONARY ARTERY OF NATIVE HEART WITHOUT ANGINA PECTORIS: ICD-10-CM

## 2023-11-17 DIAGNOSIS — N18.4 CONTROLLED TYPE 2 DIABETES MELLITUS WITH STAGE 4 CHRONIC KIDNEY DISEASE, WITHOUT LONG-TERM CURRENT USE OF INSULIN (HCC): Primary | ICD-10-CM

## 2023-11-17 DIAGNOSIS — E11.22 CONTROLLED TYPE 2 DIABETES MELLITUS WITH STAGE 4 CHRONIC KIDNEY DISEASE, WITHOUT LONG-TERM CURRENT USE OF INSULIN (HCC): Primary | ICD-10-CM

## 2023-11-17 DIAGNOSIS — I70.213 ATHEROSCLEROSIS OF NATIVE ARTERIES OF EXTREMITIES WITH INTERMITTENT CLAUDICATION, BILATERAL LEGS (HCC): ICD-10-CM

## 2023-11-17 DIAGNOSIS — N18.4 CKD (CHRONIC KIDNEY DISEASE) STAGE 4, GFR 15-29 ML/MIN (HCC): ICD-10-CM

## 2023-11-17 DIAGNOSIS — E78.2 MIXED HYPERLIPIDEMIA: ICD-10-CM

## 2023-11-17 PROCEDURE — 99214 OFFICE O/P EST MOD 30 MIN: CPT | Performed by: FAMILY MEDICINE

## 2023-11-17 NOTE — ASSESSMENT & PLAN NOTE
Patient is stable with current meds and discussed a low carb diet. Pt  recommended to see eye doctor and foot doctor for routine screening as per protocol. Recheck A1C  and Cr in 3 months. Patient questions answered today about this condtion.    Lab Results   Component Value Date    HGBA1C 6.7 (H) 08/31/2023

## 2023-11-17 NOTE — PROGRESS NOTES
Name: Jodi Pallas. : 1945      MRN: [id number]  Encounter Provider: Guy Lazo MD  Encounter Date: 2023   Encounter department: [address] [place] [address]     1. Controlled type 2 diabetes mellitus with stage 4 chronic kidney disease, without long-term current use of insulin ([address])  Assessment & Plan:  Patient is stable with current meds and discussed a low carb diet. Pt  recommended to see eye doctor and foot doctor for routine screening as per protocol. Recheck A1C  and Cr in 3 months. Patient questions answered today about this condtion. Lab Results   Component Value Date    HGBA1C 6.7 (H) 2023       Orders:  -     IRIS Diabetic eye exam    2. Atherosclerosis of native arteries of extremities with intermittent claudication, bilateral legs ([address])  Assessment & Plan:  Patient is stable  and will continue present plan of care and reassess at next routine visit. All questions about this problem from patient were answered today. 3. Coronary artery disease involving native coronary artery of native heart without angina pectoris  Assessment & Plan:  Patient to continue  with current cardiac meds to decrease risk of re stenosis. Patient to follow up with cardiology for scheduled appointments to decrease risk for further cardiac problems with appropriate diagnostic testing to reassess cardiac status. Patient had alll questions about this problem answered today. 4. CKD (chronic kidney disease) stage 4, GFR 15-29 ml/min [facility])  Assessment & Plan:  Lab Results   Component Value Date    EGFR 26 2023    EGFR 27 2023    EGFR 22 2023    CREATININE 2.26 (H) 2023    CREATININE 2.17 (H) 2023    CREATININE 2.60 (H) 2023   Pt to avoid NSAIDs and any IV dyes. Patient to follow up eoither with nephrology or  with us for  further  monitoring of  renal function.        5. Mixed hyperlipidemia  Assessment & Plan:  Patient  is stable with current medication and we discussed a low fat low cholesterol diet. Weight loss also discussed for this will help lower cholesterol also. Recheck lipids in 6 months. 6. Primary hypertension  Assessment & Plan:  Patient is stable with current anti-hypertensive medicine and continue to follow a low sodium diet and take current medication. All questions about this condition were answered today. Falls Plan of Care: balance, strength, and gait training instructions were provided. Home safety education provided. Subjective     An 78-year-old male here today for checkup on multimedical lawrence patient with peripheral vascular disease with multiple surgeries to correct that also coronary disease hypertension hyperlipidemia and chronic kidney disease stage IV. Patient also with developed some tendinitis in his right thumb and hand he recently been to a walk-in center and had been referred to orthopedics who referred him to a hand specialist.  Patient had appears to have tendinitis of his right thumb as well as it in his right hand and wrist and also he has a Achilles tendinitis in the left lower extremity. Patient had been on prednisone because anti-inflammatories are not good with his kidney disease. Discussed with patient keeping his appointment with the hand surgeon and most probable needed a steroid injection in the tendon sheath to alleviate his symptoms. Patient states his prednisone pulse did help him with that but he is not finished up with that. Patient is doing well but his blood pressure. Review of Systems   Constitutional:  Negative for activity change, appetite change, chills, fatigue, fever and unexpected weight change. HENT:  Negative for congestion, ear pain, hearing loss, mouth sores, postnasal drip, sinus pressure, sinus pain, sneezing and sore throat. Respiratory:  Negative for apnea, cough, shortness of breath and wheezing. Cardiovascular:  Negative for chest pain, palpitations and leg swelling. Gastrointestinal:  Negative for abdominal pain, constipation, diarrhea, nausea and vomiting. Endocrine: Negative for cold intolerance and heat intolerance. Genitourinary:  Negative for dysuria, frequency and hematuria. Musculoskeletal:  Negative for arthralgias, back pain, gait problem, joint swelling and neck pain. Skin:  Negative for rash. Neurological:  Negative for dizziness, weakness and numbness. Hematological:  Does not bruise/bleed easily. Psychiatric/Behavioral:  Negative for agitation, behavioral problems, confusion, hallucinations and sleep disturbance. The patient is not nervous/anxious. 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 ([address]) 2/24/2023   • Basal cell carcinoma     right cheek   • CAD (coronary artery disease)    • Carotid stenosis, asymptomatic, bilateral    • Chronic kidney disease    • Colon polyp    • Diabetes ([address])     type 2, non-insulin dependent   • Diabetes mellitus (HCC)    • GERD (gastroesophageal reflux disease)    • History of nephrolithiasis    • Hyperlipidemia    • Hypertension    • Left foot pain 11/30/2022   • PAD (peripheral artery disease) ([facility])    • Severe aortic stenosis    • Squamous cell skin cancer 11/22/2022    left superior helix     Past Surgical History:   Procedure Laterality Date   • APPENDECTOMY     • CARDIAC CATHETERIZATION N/A 05/05/2022    Procedure: CARDIAC RHC/LHC; Surgeon: Maikel Singh DO;  [facility]; Service: Cardiology   • CARDIAC CATHETERIZATION N/A 05/05/2022    Procedure: Cardiac Coronary Angiogram;  Surgeon: Maikel Singh DO;  [facility];   Service: Cardiology   • CARDIAC CATHETERIZATION N/A 05/24/2022 Procedure: Cardiac pci;  Surgeon: Harrison Mcdowell MD;  [facility]: [facility];   Service: Cardiology   • CARDIAC CATHETERIZATION N/A 06/14/2022    Procedure: CARDIAC TAVR;  Surgeon: Francisco Huggins MD;  Location: [facility];  Service: Cardiology   • COLECTOMY     • COLONOSCOPY  2013   • CORONARY ARTERY BYPASS GRAFT  2013    X 2   • FEMORAL ARTERY - POPLITEAL ARTERY BYPASS GRAFT     • HEMORRHOID SURGERY     • IR AORTAGRAM WITH RUN-OFF  11/19/2018   • IR AORTAGRAM WITH RUN-OFF  3/2/2023   • IR LOWER EXTREMITY ANGIOGRAM  3/23/2023   • MOHS SURGERY Left 01/11/2023    SCC left superior helix-Dr Tovar   • [place] SLCTV CATHJ 3RD+ ORD SLCTV ABDL PEL/LXTR [facility] Left 08/12/2016    Procedure: LEFT FEMORAL ARTERIOGRAM; BALLOON ANGIOPLASTY; SFA  AND FEMORAL AT VEIN GRAFT;  Surgeon: Yesika Stockton MD;  Location: [facility] MA[facility];  Service: Vascular   • [place] TEAEC W/WO PATCH GRAFT COMMON FEMORAL Right 3/23/2023    Procedure: Common femoral endarterectomy&antegrade intervention of SFA, popliteal artery w/ shockwave;  Surgeon: Liz Sandoval MD;  Location[facility];  Service: Vascular   • [place] TRANSCATHETER TRANSAPICAL REPLACEMT AORTIC VALVE N/A 06/14/2022    Procedure: REPLACEMENT AORTIC VALVE TRANSCATHETER (TAVR) TRANSAPICAL 29MM IRVIN KYLER S3 ULTRA VALVE(ACCESS ON LEFT) ELANA;  Surgeon: Felix Nugent DO;  Location: [facility] MA[facility] OR;  Service: Cardiac Surgery   • SKIN CANCER EXCISION     • TONSILLECTOMY AND ADENOIDECTOMY       Family History   Problem Relation Age of Onset   • Heart attack Father    • Other Sister         bypass and vlave replacement   • Stroke Paternal Uncle    • Arrhythmia Neg Hx    • Asthma Neg Hx    • Clotting disorder Neg Hx    • Fainting Neg Hx    • Anuerysm Neg Hx    • Hypertension Neg Hx         unsure    • Hyperlipidemia Neg Hx    • Heart failure Neg Hx      Social History     Socioeconomic History   • Marital status:      Spouse name: None   • Number of children: None   • Years of education: None • Highest education level: None   Occupational History   • None   Tobacco Use   • Smoking status: Some Days     Packs/day: 0.25     Years: 50.00     Total pack years: 12.50     Types: Cigarettes   • Smokeless tobacco: Never   Vaping Use   • Vaping Use: Never used   Substance and Sexual Activity   • Alcohol use: Not Currently     Comment: rare   • Drug use: No   • Sexual activity: None   Other Topics Concern   • None   Social History Narrative    · Most recent tobacco use screenin2018      · Do you currently or have you served in the [address] [place] [address] [employer]: Yes      · If Yes, What branch of service:   [employer]      · Occupation:   Dentists      · Exercise level:   Occasional        · Caffeine intake:   Heavy      · Marital status:         · Diet:   Regular  low fat     · Seat belts used routinely:   Yes      · Smoke alarm in home: Yes      · Advance directive: Yes      · General stress level:   Low      Social Determinants of Health     Financial Resource Strain: Low Risk  (2023)    Overall Financial Resource Strain (CARDIA)    • Difficulty of Paying Living Expenses: Not very hard   Food Insecurity: Unknown (3/24/2023)    Hunger Vital Sign    • Worried About Running Out of Food in the Last Year: Never true    • Ran Out of Food in the Last Year: Not on file   Transportation Needs: No Transportation Needs (2023)    PRAPARE - Transportation    • Lack of Transportation (Medical): No    • Lack of Transportation (Non-Medical):  No   Physical Activity: Not on file   Stress: Not on file   Social Connections: Not on file   Intimate Partner Violence: Not on file   Housing Stability: Low Risk  (3/24/2023)    Housing Stability Vital Sign    • Unable to Pay for Housing in the Last Year: No    • Number of Places Lived in the Last Year: 1    • Unstable Housing in the Last Year: No     Current Outpatient Medications on File Prior to Visit   Medication Sig   • acetaminophen (TYLENOL) 325 mg tablet Take 3 tablets (975 mg total) by mouth every 8 (eight) hours (Patient not taking: Reported on 2023)   • allopurinol (ZYLOPRIM) 300 mg tablet TAKE 1 TABLET DAILY   • amLODIPine (NORVASC) 10 mg tablet TAKE 1 TABLET DAILY   • Ascorbic Acid (vitamin C) 100 MG tablet Take 250 mg by mouth daily (Patient not taking: Reported on 10/12/2023)   • aspirin (ECOTRIN LOW STRENGTH) 81 mg EC tablet Take 81 mg by mouth daily   • atorvastatin (LIPITOR) 80 mg tablet TAKE 1 TABLET DAILY AT     BEDTIME   • calcitriol (ROCALTROL) 0.25 mcg capsule Take 1 capsule (0.25 mcg total) by mouth daily   • clopidogrel (PLAVIX) 75 mg tablet Take 1 tablet (75 mg total) by mouth daily   • Cyanocobalamin (VITAMIN B12 PO) Take by mouth once a week (Patient not taking: Reported on 2023)   • D3-50 1.25 MG (77067 UT) capsule TAKE 1 CAPSULE ONCE WEEKLY   • docusate sodium (COLACE) 100 mg capsule Take 1 capsule (100 mg total) by mouth 2 (two) times a day (Patient not taking: Reported on 2023)   • Empagliflozin (Jardiance) 10 MG TABS tablet Take 1 tablet (10 mg total) by mouth in the morning   • glimepiride (AMARYL) 1 mg tablet Take 1 tablet (1 mg total) by mouth daily with breakfast   • glipiZIDE (GLUCOTROL) 5 mg tablet TAKE 1 TABLET DAILY WITH   BREAKFAST   • lisinopril (ZESTRIL) 20 mg tablet Take 1 tablet (20 mg total) by mouth daily   • Magnesium Oxide (MAG-200 PO) Take by mouth once a week (Patient not taking: Reported on 2023)   • metoprolol succinate (TOPROL-XL) 25 mg 24 hr tablet Take 0.5 tablets (12.5 mg total) by mouth daily   • Multiple Vitamin (multivitamin) tablet Take 1 tablet by mouth daily Takes when he can remember.  (Patient not taking: Reported on 10/12/2023)   • pancrelipase, Lip-Prot-Amyl, (CREON) 24,000 units Take 24,000 units of lipase by mouth 3 (three) times a day with meals   • pantoprazole (PROTONIX) 40 mg tablet Take 1 tablet (40 mg total) by mouth daily   • [] sulfamethoxazole-trimethoprim (BACTRIM DS) 800-160 mg per tablet Take 1 tablet by mouth every 12 (twelve) hours for 7 days (Patient not taking: Reported on 11/16/2023)   • torsemide (DEMADEX) 20 mg tablet TAKE 0.5 TABLETS (10 MG TOTAL) BY MOUTH DAILY TAKES 10 MG ONCE A DAY. No Known Allergies  Immunization History   Administered Date(s) Administered   • COVID-19 Moderna mRNA Vaccine 12 Yr+ 50 mcg/0.5 mL (Spikevax) 10/04/2023   • COVID-19 PFIZER VACCINE 0.3 ML IM 03/03/2021, 03/24/2021, 09/30/2021   • COVID-19 Pfizer Vac BIVALENT Stuart-sucrose 12 Yr+ IM 09/29/2022   • H1N1 Inj 11/15/2020   • H1N1, All Formulations 11/15/2020   • INFLUENZA 10/04/2011, 10/19/2012, 10/16/2014, 10/13/2016, 10/06/2018, 11/11/2021, 10/19/2022, 10/19/2023   • Influenza Split High Dose Preservative Free IM 10/06/2018, 11/01/2019   • Pneumococcal Conjugate 13-Valent 12/04/2015   • Pneumococcal Polysaccharide PPV23 06/11/2019   • Respiratory Syncytial Virus Vaccine (Recombinant, Adjuvanted) 10/04/2023   • Tdap 10/15/2021       Objective     /50 (BP Location: Left arm, Patient Position: Sitting, Cuff Size: Standard)   Pulse 66   Temp 98 °F (36.7 °C) (Temporal)   Resp 18   Ht 5' 10" (1.778 m)   Wt 78.5 kg (173 lb)   SpO2 97%   BMI 24.82 kg/m²     Physical Exam  Vitals and nursing note reviewed. Constitutional:       Appearance: He is well-developed. HENT:      Head: Normocephalic and atraumatic. Nose: Nose normal.      Mouth/Throat:      Mouth: Mucous membranes are moist.   Eyes:      General: No scleral icterus. Conjunctiva/sclera: Conjunctivae normal.      Pupils: Pupils are equal, round, and reactive to light. Neck:      Thyroid: No thyromegaly. Cardiovascular:      Rate and Rhythm: Normal rate and regular rhythm. Heart sounds: Normal heart sounds. Pulmonary:      Effort: Pulmonary effort is normal. No respiratory distress. Breath sounds: Normal breath sounds. No wheezing.    Abdominal:      General: Bowel sounds are normal.      Palpations: Abdomen is soft. Tenderness: There is no abdominal tenderness. There is no guarding or rebound. Musculoskeletal:         General: Swelling and tenderness present. Normal range of motion. Cervical back: Normal range of motion and neck supple. Skin:     General: Skin is warm and dry. Findings: No rash. Neurological:      Mental Status: He is alert and oriented to person, place, and time. Psychiatric:         Mood and Affect: Mood normal.         Behavior: Behavior normal.         Thought Content:  Thought content normal.         Judgment: Judgment normal.       Luisa Brothers MD

## 2023-11-26 DIAGNOSIS — I10 ESSENTIAL HYPERTENSION: ICD-10-CM

## 2023-11-27 ENCOUNTER — EVALUATION (OUTPATIENT)
Dept: OCCUPATIONAL THERAPY | Facility: MEDICAL CENTER | Age: 78
End: 2023-11-27
Payer: MEDICARE

## 2023-11-27 DIAGNOSIS — M77.8 TENDINITIS OF THUMB: ICD-10-CM

## 2023-11-27 DIAGNOSIS — M18.12 ARTHRITIS OF CARPOMETACARPAL (CMC) JOINT OF LEFT THUMB: ICD-10-CM

## 2023-11-27 DIAGNOSIS — M18.11 ARTHRITIS OF CARPOMETACARPAL (CMC) JOINT OF RIGHT THUMB: ICD-10-CM

## 2023-11-27 DIAGNOSIS — R22.31 LOCALIZED SWELLING ON RIGHT HAND: Primary | ICD-10-CM

## 2023-11-27 PROCEDURE — 97530 THERAPEUTIC ACTIVITIES: CPT

## 2023-11-27 PROCEDURE — 97165 OT EVAL LOW COMPLEX 30 MIN: CPT

## 2023-11-27 RX ORDER — AMLODIPINE BESYLATE 10 MG/1
TABLET ORAL
Qty: 90 TABLET | Refills: 3 | Status: SHIPPED | OUTPATIENT
Start: 2023-11-27

## 2023-11-27 NOTE — PROGRESS NOTES
OT Evaluation     Today's date: 2023  Patient name: Johanny Rudd. : 1945  MRN: [id number]  Referring provider: Florencio Singleton  Dx:   Encounter Diagnosis     ICD-10-CM    1. Localized swelling on right hand  R22.31 Ambulatory Referral to PT/OT Hand Therapy      2. Tendinitis of thumb  M77.8 Ambulatory Referral to PT/OT Hand Therapy      3. Arthritis of carpometacarpal (CMC) joint of right thumb  M18.11 Ambulatory Referral to PT/OT Hand Therapy        Eval/Re-Eval POC Expires Auth #/ Referral # Total Visits Start Date Expiration Date Extension Info Visits Limitation                                                                 1 2 3 4 5 6           7 8 9 10 11 12           13 14 15 16 17 18           19 20 21 22 23 24           25 26 27 28 29 30                            Assessment  Assessment details: Pt presents to OT evaluation on 2023 secondary to [facility] arthritis and tendonitis of the R thumb. 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 Went to Urgent care and was put on Prednisone due to R thumb swelling. Hand Dominant: R. Reports that pain is beginning in the L thumb a few days ago. Does not wear any bracing. Pt was educated on comfort cool thumb spica. Tenderness with palpation of CMC joint on the L side. Pt has slightly impaired thumb AROM radial/abduction of the L versus the R. Normal  strength L/R. Pt was educated on thumb AROM exercises and tendon glides. Eduated pt on heat for use as of modality. Pt will be seen for OT services 1-2x/wk for 10 weeks. POC was reviewed with the pt, pt understands and agrees with POC.    Impairments: abnormal or restricted ROM, activity intolerance, impaired physical strength, lacks appropriate home exercise program and pain with function    Goals  STG:  - Pt will exhibit understanding and demonstrate carry over of HEP   - Pt will begin to verbally report a decrease in pain from time of initial evaluation with use of modalities    LTG:  - Pt will exhibit improvements of ROM to [facility] for increased independence during IADL tasks. - Pt will increase by  strength pain free when compared to initial evaluation for improved participation in IADL  - Pt will consistently report decreased pain when compared to initial evaluation.  - Pt will report feeling >75% back to normal for increased independence in daily routine and tasks. - Pt will express readiness for discharge with improved independence. Plan  Patient would benefit from: skilled occupational therapy  Planned modality interventions: thermotherapy: hydrocollator packs  Planned therapy interventions: IASTM, manual therapy, [place] taping, patient education, strengthening, stretching, therapeutic activities, therapeutic exercise, functional ROM exercises, graded exercise, graded activity and home exercise program  Frequency: 2x week  Duration in weeks: 10  Plan of Care beginning date: 2023  Plan of Care expiration date: 2024  Treatment plan discussed with: patient        Subjective Evaluation    History of Present Illness  Mechanism of injury: Pt presents to OT evaluation on 2023 secondary to [facility] arthritis and tendonitis of the R thumb. 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 Went to Urgent care and was put on Prednisone due to R thumb swelling. Hand Dominant: R. Reports that pain is beginning in the L thumb a few days ago. Does not wear any bracing. Retired: Dentist for 48 days.    IADL: Gardening "I think the Gardening really aggravated it."    Patient Goals  Patient goals for therapy: increased motion, decreased pain, decreased edema, increased strength, independence with ADLs/IADLs and return to work    Pain  Current pain ratin  At best pain ratin  At worst pain ratin  Relieving factors: ice and medications    Hand dominance: right          Objective     Active Range of Motion     Left Wrist   Wrist flexion: 58 degrees   Wrist extension: 68 degrees   Radial deviation: 25 degrees   Ulnar deviation: 30 degrees     Right Wrist   Wrist flexion: 60 degrees   Wrist extension: 65 degrees   Radial deviation: 25 degrees   Ulnar deviation: 30 degrees     Left Thumb   Palmar Abduction     CMC: 45 degrees  Radial abduction    CMC: 50 degrees    Opposition: Full thumb opposition    Right Thumb   Palmar Abduction    CMC: 50  Radial Abduction    CMC: 60  Opposition: Full thumb opposition    Additional Active Range of Motion Details  Full composite fist bilaterally    Strength/Myotome Testing     Left Wrist/Hand      (2nd hand position)     Trial 1: 55    Thumb Strength  Key/Lateral Pinch     Trial 1: 18    Right Wrist/Hand      (2nd hand position)     Trial 1: 68    Thumb Strength   Key/Lateral Pinch     Trial 1: 20    Swelling     Left Wrist/Hand   Circumference wrist: 17.5 cm    Right Wrist/Hand   Circumference wrist: 17.8 cm           Precautions: Universal    Manuals HEP                        Ther Ex     Education on HEP and dx x5 min     AROM tendon glides x10    AROM table top extension x10    AROM digit add/abduction x10    AROM wrist flex/ext/RD/UD x10    AROM forearm rotation x10    PROM wrist flex/ext 3 x 20 second hold                                  Modalities     Heat 10-15 min 10 min in conjunction with HEP education          Therapist supervised patient with use of modalities during today's treatment session. Skin integrity was assessed and appeared normal following use of modalities. Assessment:   Pt presents to OT evaluation on 11/27/2023 secondary to [facility] arthritis and tendonitis of the R thumb. 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 Went to Urgent care and was put on Prednisone due to R thumb swelling. Hand Dominant: R.  Reports that pain is beginning in the L thumb a few days ago. Does not wear any bracing. Pt was educated on comfort cool thumb spica. Tenderness with palpation of CMC joint on the L side. Pt has slightly impaired thumb AROM radial/abduction of the L versus the R. Normal  strength L/R. Pt was educated on thumb AROM exercises and tendon glides. Eduated pt on heat for use as of modality. Pt will be seen for OT services 1-2x/wk for 10 weeks. POC was reviewed with the pt, pt understands and agrees with POC. Plan:   Focus on pain with function to improve ability to complete daily activites with ease.   POC: 11/27-2/5/2023

## 2023-11-30 ENCOUNTER — OFFICE VISIT (OUTPATIENT)
Dept: OCCUPATIONAL THERAPY | Facility: MEDICAL CENTER | Age: 78
End: 2023-11-30
Payer: MEDICARE

## 2023-11-30 DIAGNOSIS — M18.11 ARTHRITIS OF CARPOMETACARPAL (CMC) JOINT OF RIGHT THUMB: ICD-10-CM

## 2023-11-30 DIAGNOSIS — R22.31 LOCALIZED SWELLING ON RIGHT HAND: Primary | ICD-10-CM

## 2023-11-30 DIAGNOSIS — M77.8 TENDINITIS OF THUMB: ICD-10-CM

## 2023-11-30 PROCEDURE — 97110 THERAPEUTIC EXERCISES: CPT

## 2023-11-30 PROCEDURE — 97140 MANUAL THERAPY 1/> REGIONS: CPT

## 2023-11-30 NOTE — PROGRESS NOTES
Daily Note     Today's date: 2023  Patient name: Claire Cortez. : 1945  MRN: [id number]  Referring provider: Hugh Miranda  Dx:   Encounter Diagnosis     ICD-10-CM    1. Localized swelling on right hand  R22.31       2. Tendinitis of thumb  M77.8       3. Arthritis of carpometacarpal (CMC) joint of right thumb  M18.11                      Subjective: "The exercises are fine."    Objective: See treatment diary below       Precautions: Universal    Manuals HEP    Thenar Knox on L  Time: 10 minutes                  Ther Ex     Education on HEP and dx x5 min     AROM tendon glides x10 X 10   AROM table top extension x10 X 10   AROM digit add/abduction x10 X 10   AROM wrist flex/ext/RD/UD x10 X 10   AROM forearm rotation x10 X 10   PROM wrist flex/ext 3 x 20 second hold 3 x 20 second hold   Thumb Radial/Palmar Abduction  X 10   Palm to Fingertip Translation  Time: 5 minutes   Towel Scrunches  X 20   Thumb Presses Red Band  X 20             Modalities     Heat 10-15 min 5 minutes          Therapist supervised patient with use of modalities during today's treatment session. Skin integrity was assessed and appeared normal following use of modalities. Assessment:   Pt is continuing to benefit from OT treatment services. Pt demonstrated tenderness in thenar eminence on the L. Educated pt on towel scrunches and palm to fingertip translation with marbles. Will continue to progress pt as tolerated. Plan:   Focus on pain with function to improve ability to complete daily activites with ease.   POC: -2023

## 2023-12-04 ENCOUNTER — HOSPITAL ENCOUNTER (INPATIENT)
Facility: HOSPITAL | Age: 78
LOS: 6 days | Discharge: HOME | DRG: 378 | End: 2023-12-10
Attending: EMERGENCY MEDICINE | Admitting: INTERNAL MEDICINE
Payer: MEDICARE

## 2023-12-04 DIAGNOSIS — D62 ACUTE BLOOD LOSS ANEMIA: ICD-10-CM

## 2023-12-04 DIAGNOSIS — N18.9 ACUTE-ON-CHRONIC KIDNEY INJURY: ICD-10-CM

## 2023-12-04 DIAGNOSIS — K21.9 GASTROESOPHAGEAL REFLUX DISEASE, UNSPECIFIED WHETHER ESOPHAGITIS PRESENT: ICD-10-CM

## 2023-12-04 DIAGNOSIS — K92.2 GI BLEED: Primary | ICD-10-CM

## 2023-12-04 DIAGNOSIS — N17.9 ACUTE-ON-CHRONIC KIDNEY INJURY: ICD-10-CM

## 2023-12-04 DIAGNOSIS — Z95.2 S/P TAVR (TRANSCATHETER AORTIC VALVE REPLACEMENT): ICD-10-CM

## 2023-12-04 PROBLEM — N18.4 ACUTE RENAL FAILURE SUPERIMPOSED ON STAGE 4 CHRONIC KIDNEY DISEASE (HCC): Status: ACTIVE | Noted: 2023-12-04

## 2023-12-04 LAB
2HR DELTA HS TROPONIN: 7 NG/L
4HR DELTA HS TROPONIN: 39 NG/L
ABO GROUP BLD: NORMAL
ALBUMIN SERPL BCP-MCNC: 3.4 G/DL (ref 3.5–5)
ALP SERPL-CCNC: 58 U/L (ref 34–104)
ALT SERPL W P-5'-P-CCNC: 5 U/L (ref 7–52)
ANION GAP SERPL CALCULATED.3IONS-SCNC: 9 MMOL/L
APTT PPP: 26 SECONDS (ref 23–37)
AST SERPL W P-5'-P-CCNC: 8 U/L (ref 13–39)
ATRIAL RATE: 64 BPM
BASOPHILS # BLD AUTO: 0.02 THOUSANDS/ÂΜL (ref 0–0.1)
BASOPHILS NFR BLD AUTO: 0 % (ref 0–1)
BILIRUB SERPL-MCNC: 0.35 MG/DL (ref 0.2–1)
BLD GP AB SCN SERPL QL: NEGATIVE
BUN SERPL-MCNC: 64 MG/DL (ref 5–25)
CALCIUM ALBUM COR SERPL-MCNC: 9.1 MG/DL (ref 8.3–10.1)
CALCIUM SERPL-MCNC: 8.6 MG/DL (ref 8.4–10.2)
CARDIAC TROPONIN I PNL SERPL HS: 18 NG/L
CARDIAC TROPONIN I PNL SERPL HS: 25 NG/L
CARDIAC TROPONIN I PNL SERPL HS: 57 NG/L
CHLORIDE SERPL-SCNC: 107 MMOL/L (ref 96–108)
CO2 SERPL-SCNC: 23 MMOL/L (ref 21–32)
CREAT SERPL-MCNC: 2.93 MG/DL (ref 0.6–1.3)
EOSINOPHIL # BLD AUTO: 0.1 THOUSAND/ÂΜL (ref 0–0.61)
EOSINOPHIL NFR BLD AUTO: 1 % (ref 0–6)
ERYTHROCYTE [DISTWIDTH] IN BLOOD BY AUTOMATED COUNT: 15.5 % (ref 11.6–15.1)
GFR SERPL CREATININE-BSD FRML MDRD: 19 ML/MIN/1.73SQ M
GLUCOSE SERPL-MCNC: 101 MG/DL (ref 65–140)
GLUCOSE SERPL-MCNC: 240 MG/DL (ref 65–140)
GLUCOSE SERPL-MCNC: 98 MG/DL (ref 65–140)
HCT VFR BLD AUTO: 22.6 % (ref 36.5–49.3)
HCT VFR BLD AUTO: 23.8 % (ref 36.5–49.3)
HGB BLD-MCNC: 7.4 G/DL (ref 12–17)
HGB BLD-MCNC: 7.5 G/DL (ref 12–17)
IMM GRANULOCYTES # BLD AUTO: 0.03 THOUSAND/UL (ref 0–0.2)
IMM GRANULOCYTES NFR BLD AUTO: 0 % (ref 0–2)
INR PPP: 1.03 (ref 0.84–1.19)
LIPASE SERPL-CCNC: <6 U/L (ref 11–82)
LYMPHOCYTES # BLD AUTO: 0.72 THOUSANDS/ÂΜL (ref 0.6–4.47)
LYMPHOCYTES NFR BLD AUTO: 10 % (ref 14–44)
MCH RBC QN AUTO: 30.8 PG (ref 26.8–34.3)
MCHC RBC AUTO-ENTMCNC: 31.1 G/DL (ref 31.4–37.4)
MCV RBC AUTO: 99 FL (ref 82–98)
MONOCYTES # BLD AUTO: 0.28 THOUSAND/ÂΜL (ref 0.17–1.22)
MONOCYTES NFR BLD AUTO: 4 % (ref 4–12)
NEUTROPHILS # BLD AUTO: 6 THOUSANDS/ÂΜL (ref 1.85–7.62)
NEUTS SEG NFR BLD AUTO: 85 % (ref 43–75)
NRBC BLD AUTO-RTO: 0 /100 WBCS
P AXIS: 78 DEGREES
PLATELET # BLD AUTO: 139 THOUSANDS/UL (ref 149–390)
PMV BLD AUTO: 11 FL (ref 8.9–12.7)
POTASSIUM SERPL-SCNC: 4 MMOL/L (ref 3.5–5.3)
PR INTERVAL: 224 MS
PROT SERPL-MCNC: 5.8 G/DL (ref 6.4–8.4)
PROTHROMBIN TIME: 14.1 SECONDS (ref 11.6–14.5)
QRS AXIS: 68 DEGREES
QRSD INTERVAL: 106 MS
QT INTERVAL: 450 MS
QTC INTERVAL: 464 MS
RBC # BLD AUTO: 2.4 MILLION/UL (ref 3.88–5.62)
RH BLD: POSITIVE
SODIUM SERPL-SCNC: 139 MMOL/L (ref 135–147)
SPECIMEN EXPIRATION DATE: NORMAL
T WAVE AXIS: 61 DEGREES
VENTRICULAR RATE: 64 BPM
WBC # BLD AUTO: 7.15 THOUSAND/UL (ref 4.31–10.16)

## 2023-12-04 PROCEDURE — C9113 INJ PANTOPRAZOLE SODIUM, VIA: HCPCS | Performed by: PHYSICIAN ASSISTANT

## 2023-12-04 PROCEDURE — 86901 BLOOD TYPING SEROLOGIC RH(D): CPT | Performed by: PHYSICIAN ASSISTANT

## 2023-12-04 PROCEDURE — 99223 1ST HOSP IP/OBS HIGH 75: CPT | Performed by: INTERNAL MEDICINE

## 2023-12-04 PROCEDURE — C9113 INJ PANTOPRAZOLE SODIUM, VIA: HCPCS | Performed by: INTERNAL MEDICINE

## 2023-12-04 PROCEDURE — 36430 TRANSFUSION BLD/BLD COMPNT: CPT

## 2023-12-04 PROCEDURE — P9016 RBC LEUKOCYTES REDUCED: HCPCS

## 2023-12-04 PROCEDURE — 83690 ASSAY OF LIPASE: CPT | Performed by: EMERGENCY MEDICINE

## 2023-12-04 PROCEDURE — 84484 ASSAY OF TROPONIN QUANT: CPT | Performed by: INTERNAL MEDICINE

## 2023-12-04 PROCEDURE — 99222 1ST HOSP IP/OBS MODERATE 55: CPT | Performed by: INTERNAL MEDICINE

## 2023-12-04 PROCEDURE — 99285 EMERGENCY DEPT VISIT HI MDM: CPT

## 2023-12-04 PROCEDURE — 86850 RBC ANTIBODY SCREEN: CPT | Performed by: PHYSICIAN ASSISTANT

## 2023-12-04 PROCEDURE — 85018 HEMOGLOBIN: CPT | Performed by: INTERNAL MEDICINE

## 2023-12-04 PROCEDURE — 86920 COMPATIBILITY TEST SPIN: CPT

## 2023-12-04 PROCEDURE — 84484 ASSAY OF TROPONIN QUANT: CPT | Performed by: PHYSICIAN ASSISTANT

## 2023-12-04 PROCEDURE — 85730 THROMBOPLASTIN TIME PARTIAL: CPT

## 2023-12-04 PROCEDURE — 85610 PROTHROMBIN TIME: CPT | Performed by: PHYSICIAN ASSISTANT

## 2023-12-04 PROCEDURE — 86900 BLOOD TYPING SEROLOGIC ABO: CPT | Performed by: PHYSICIAN ASSISTANT

## 2023-12-04 PROCEDURE — 93005 ELECTROCARDIOGRAM TRACING: CPT

## 2023-12-04 PROCEDURE — 99285 EMERGENCY DEPT VISIT HI MDM: CPT | Performed by: PHYSICIAN ASSISTANT

## 2023-12-04 PROCEDURE — 30233N1 TRANSFUSION OF NONAUTOLOGOUS RED BLOOD CELLS INTO PERIPHERAL VEIN, PERCUTANEOUS APPROACH: ICD-10-PCS | Performed by: EMERGENCY MEDICINE

## 2023-12-04 PROCEDURE — 36415 COLL VENOUS BLD VENIPUNCTURE: CPT | Performed by: EMERGENCY MEDICINE

## 2023-12-04 PROCEDURE — 80053 COMPREHEN METABOLIC PANEL: CPT | Performed by: EMERGENCY MEDICINE

## 2023-12-04 PROCEDURE — 99291 CRITICAL CARE FIRST HOUR: CPT | Performed by: EMERGENCY MEDICINE

## 2023-12-04 PROCEDURE — 85014 HEMATOCRIT: CPT | Performed by: INTERNAL MEDICINE

## 2023-12-04 PROCEDURE — 85025 COMPLETE CBC W/AUTO DIFF WBC: CPT | Performed by: EMERGENCY MEDICINE

## 2023-12-04 PROCEDURE — 82948 REAGENT STRIP/BLOOD GLUCOSE: CPT

## 2023-12-04 RX ORDER — ATORVASTATIN CALCIUM 40 MG/1
80 TABLET, FILM COATED ORAL
Status: DISCONTINUED | OUTPATIENT
Start: 2023-12-04 | End: 2023-12-10 | Stop reason: HOSPADM

## 2023-12-04 RX ORDER — NICOTINE 21 MG/24HR
1 PATCH, TRANSDERMAL 24 HOURS TRANSDERMAL DAILY
Status: DISCONTINUED | OUTPATIENT
Start: 2023-12-04 | End: 2023-12-10 | Stop reason: HOSPADM

## 2023-12-04 RX ORDER — CALCITRIOL 0.25 UG/1
0.25 CAPSULE, LIQUID FILLED ORAL DAILY
Status: DISCONTINUED | OUTPATIENT
Start: 2023-12-04 | End: 2023-12-10 | Stop reason: HOSPADM

## 2023-12-04 RX ORDER — PANTOPRAZOLE SODIUM 40 MG/10ML
40 INJECTION, POWDER, LYOPHILIZED, FOR SOLUTION INTRAVENOUS ONCE
Status: DISCONTINUED | OUTPATIENT
Start: 2023-12-04 | End: 2023-12-04

## 2023-12-04 RX ORDER — ALLOPURINOL 300 MG/1
300 TABLET ORAL DAILY
Status: DISCONTINUED | OUTPATIENT
Start: 2023-12-04 | End: 2023-12-10 | Stop reason: HOSPADM

## 2023-12-04 RX ORDER — INSULIN LISPRO 100 [IU]/ML
1-5 INJECTION, SOLUTION INTRAVENOUS; SUBCUTANEOUS
Status: DISCONTINUED | OUTPATIENT
Start: 2023-12-04 | End: 2023-12-10 | Stop reason: HOSPADM

## 2023-12-04 RX ORDER — AMLODIPINE BESYLATE 10 MG/1
10 TABLET ORAL DAILY
Status: DISCONTINUED | OUTPATIENT
Start: 2023-12-04 | End: 2023-12-07

## 2023-12-04 RX ADMIN — ALLOPURINOL 300 MG: 300 TABLET ORAL at 16:33

## 2023-12-04 RX ADMIN — AMLODIPINE BESYLATE 10 MG: 10 TABLET ORAL at 16:33

## 2023-12-04 RX ADMIN — CALCITRIOL 0.25 MCG: 0.25 CAPSULE, LIQUID FILLED ORAL at 16:33

## 2023-12-04 RX ADMIN — PANTOPRAZOLE SODIUM 8 MG/HR: 40 INJECTION, POWDER, FOR SOLUTION INTRAVENOUS at 22:00

## 2023-12-04 RX ADMIN — PANTOPRAZOLE SODIUM 8 MG/HR: 40 INJECTION, POWDER, FOR SOLUTION INTRAVENOUS at 13:15

## 2023-12-04 RX ADMIN — PANTOPRAZOLE SODIUM 80 MG: 40 INJECTION, POWDER, FOR SOLUTION INTRAVENOUS at 12:34

## 2023-12-04 RX ADMIN — NICOTINE 1 PATCH: 21 PATCH, EXTENDED RELEASE TRANSDERMAL at 16:33

## 2023-12-04 NOTE — H&P
[address] [facility]  H&P  Name: Tarah Al 78 y.o. male I MRN: [id number]  Unit/Bed#: ED-05 I Date of Admission: 12/4/2023   Date of Service: 12/4/2023 I Hospital Day: 0      Assessment/Plan   * GI bleed  Assessment & Plan  Patient presenting with melena and bright red bleeding per rectum intermittently for the last 2-1/2 days. No abdominal pain  On aspirin and Plavix for cardiac history/TAVR. Hold for now. No additional NSAID use. Patient hemodynamically stable. Started on Protonix drip. Continue. GI consulted. Keep n.p.o. for now. Acute blood loss anemia  Assessment & Plan  Hemoglobin 7.4 on admission. 3 months ago was 11.8. Secondary to GI bleed. We will transfuse 1 unit PRBC for now. Monitor hemoglobin. Transfuse for hemoglobin less than 7. Acute renal failure superimposed on stage 4 chronic kidney disease [facility])  Assessment & Plan  Lab Results   Component Value Date    EGFR 19 12/04/2023    EGFR 26 08/31/2023    EGFR 27 07/07/2023    CREATININE 2.93 (H) 12/04/2023    CREATININE 2.26 (H) 08/31/2023    CREATININE 2.17 (H) 07/07/2023     Creatinine 2.9 on admission. Baseline around 2.2  Suspect SHOAIB secondary to GI bleed. We will hold lisinopril and diuretic for now. Monitor hemoglobin with transfusion. S/P TAVR (transcatheter aortic valve replacement)  Assessment & Plan  Patient on aspirin and Plavix which will be held for now because of GI bleed. Chronic diastolic CHF (congestive heart failure) (HCC)  Assessment & Plan  Wt Readings from Last 3 Encounters:   11/17/23 78.5 kg (173 lb)   11/16/23 78 kg (172 lb)   11/15/23 78 kg (172 lb)       Appears euvolemic.   Hold torsemide for now because of SHOAIB        Controlled type 2 diabetes mellitus with stage 4 chronic kidney disease, without long-term current use of insulin [facility])  Assessment & Plan  Lab Results   Component Value Date    HGBA1C 6.7 (H) 08/31/2023       No results for input(s): "POCGLU" in the last 72 hours. Blood Sugar Average: Last 72 hrs:    Hold p.o. meds. Sliding scale insulin for now  Patient reports that his blood glucose was low yesterday because took his glimepiride and did not eat anything so had to take some glucose tablets. Blood glucose now elevated on admission. CAD (coronary artery disease)  Assessment & Plan  Hold aspirin Plavix for now because of GI bleed    Primary hypertension  Assessment & Plan  Blood pressure stable. Continue amlodipine, Toprol-XL. Hold lisinopril and torsemide because of SHOAIB             VTE Prophylaxis: Pharmacologic VTE Prophylaxis contraindicated due to GI bleed   / sequential compression device   Code Status: full  Discussion with family: pt    Anticipated Length of Stay:  Patient will be admitted on an Inpatient basis with an anticipated length of stay of  > 2 midnights. Justification for Hospital Stay: GI bleed    Chief Complaint:   Melena and bright red bleeding per rectum    History of Present Illness:    Johanny Velasquez is a 78 y.o. male who presents with multiple episodes of melena and bright red bleeding per rectum intermittently for the last 2 to 3 days. No abdominal pain. History of coronary artery disease and TAVR, currently on aspirin and Plavix. No other anticoagulation. No other NSAID use. Complaining of dizziness for last 2 days. He said he stopped taking his medications for last 2 days because of the GI bleed. Review of Systems:    Review of Systems   Constitutional:  Negative for chills, fatigue and fever. HENT:  Negative for congestion and sore throat. Respiratory:  Negative for cough and shortness of breath. Cardiovascular:  Negative for chest pain and palpitations. Gastrointestinal:  Positive for blood in stool. Negative for abdominal pain, diarrhea, nausea and vomiting. Genitourinary:  Negative for difficulty urinating, dysuria, flank pain, frequency and urgency.    Musculoskeletal:  Negative for arthralgias and myalgias. Skin:  Negative for color change and rash. Neurological:  Positive for dizziness and light-headedness. Psychiatric/Behavioral:  Negative for agitation, behavioral problems and confusion. 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 ([address]) 2/24/2023    Basal cell carcinoma     right cheek    CAD (coronary artery disease)     Carotid stenosis, asymptomatic, bilateral     Chronic kidney disease     Colon polyp     Diabetes ([address])     type 2, non-insulin dependent    Diabetes mellitus (HCC)     GERD (gastroesophageal reflux disease)     History of nephrolithiasis     Hyperlipidemia     Hypertension     Left foot pain 11/30/2022    PAD (peripheral artery disease) (HCC)     Severe aortic stenosis     Squamous cell skin cancer 11/22/2022    left superior helix       Past Surgical History:   Procedure Laterality Date    APPENDECTOMY      CARDIAC CATHETERIZATION N/A 05/05/2022    Procedure: CARDIAC RHC/LHC; Surgeon: Sharon Mukherjee DO;  Location: [facility]; Service: Cardiology    CARDIAC CATHETERIZATION N/A 05/05/2022    Procedure: Cardiac Coronary Angiogram;  Surgeon: Sharon Mukherjee DO;  [facility]; Service: Cardiology    CARDIAC CATHETERIZATION N/A 05/24/2022    Procedure: Cardiac pci;  Surgeon: Harrison Mcdowell MD;  Location: [facility];   Service: Cardiology    CARDIAC CATHETERIZATION N/A 06/14/2022    Procedure: CARDIAC TAVR;  Surgeon: Francisco Huggins MD;  Location: [facility];  Service: Cardiology    COLECTOMY      COLONOSCOPY  2013    CORONARY ARTERY BYPASS GRAFT  2013    X 2    FEMORAL ARTERY - POPLITEAL ARTERY BYPASS GRAFT      HEMORRHOID SURGERY      IR AORTAGRAM WITH RUN-OFF  11/19/2018    IR AORTAGRAM WITH RUN-OFF  3/2/2023    IR LOWER EXTREMITY ANGIOGRAM  3/23/2023    MOHS SURGERY Left 01/11/2023    SCC left superior helix-Dr Tovar    CT SLCTV CATHJ 3RD+ ORD SLCTV ABDL PEL/LXTR [facility] Left 08/12/2016    Procedure: LEFT FEMORAL ARTERIOGRAM; BALLOON ANGIOPLASTY; SFA  AND FEMORAL AT VEIN GRAFT;  Surgeon: Gillian Kussmaul, MD;  Location: [facility];  Service: Vascular    CT TEAEC W/WO PATCH GRAFT COMMON FEMORAL Right 3/23/2023    Procedure: Common femoral endarterectomy&antegrade intervention of SFA, popliteal artery w/ shockwave;  Surgeon: Meche Zuniga MD;  Location: [facility];  Service: Vascular    CT TRANSCATHETER TRANSAPICAL REPLACEMT AORTIC VALVE N/A 06/14/2022    Procedure: REPLACEMENT AORTIC VALVE TRANSCATHETER (TAVR) TRANSAPICAL 29MM IRVIN KYLER S3 ULTRA VALVE(ACCESS ON LEFT) ELANA;  Surgeon: Kyra Fowler DO;  Location: [facility];  Service: Cardiac Surgery    SKIN CANCER EXCISION      TONSILLECTOMY AND ADENOIDECTOMY         Meds/Allergies:    Prior to Admission medications    Medication Sig Start Date End Date Taking?  Authorizing Provider   acetaminophen (TYLENOL) 325 mg tablet Take 3 tablets (975 mg total) by mouth every 8 (eight) hours  Patient not taking: Reported on 6/13/2023 6/17/22   Carissa Hensley PA-C   allopurinol (ZYLOPRIM) 300 mg tablet TAKE 1 TABLET DAILY 11/16/23   Althea Renee MD   amLODIPine (NORVASC) 10 mg tablet TAKE 1 TABLET DAILY 11/27/23   Aaliyah Barrera MD   Ascorbic Acid (vitamin C) 100 MG tablet Take 250 mg by mouth daily  Patient not taking: Reported on 10/12/2023    Historical Provider, MD   aspirin (ECOTRIN LOW STRENGTH) 81 mg EC tablet Take 81 mg by mouth daily    Historical Provider, MD   atorvastatin (LIPITOR) 80 mg tablet TAKE 1 TABLET DAILY AT     BEDTIME 2/22/23   Aaliyah Barrera MD   calcitriol (ROCALTROL) 0.25 mcg capsule Take 1 capsule (0.25 mcg total) by mouth daily 9/5/23   Rosanna Kneyon MD   clopidogrel (PLAVIX) 75 mg tablet Take 1 tablet (75 mg total) by mouth daily 8/15/23   Bryson Arango MD   Cyanocobalamin (VITAMIN B12 PO) Take by mouth once a week  Patient not taking: Reported on 6/13/2023    Historical Provider, MD   D3-50 1.25 MG (70947 UT) capsule TAKE 1 CAPSULE ONCE WEEKLY 11/16/23   Candie Walter MD   docusate sodium (COLACE) 100 mg capsule Take 1 capsule (100 mg total) by mouth 2 (two) times a day  Patient not taking: Reported on 11/16/2023 6/17/22   Abdiel Medina PA-C   Empagliflozin (Jardiance) 10 MG TABS tablet Take 1 tablet (10 mg total) by mouth in the morning 9/26/23   Candie Walter MD   glimepiride (AMARYL) 1 mg tablet Take 1 tablet (1 mg total) by mouth daily with breakfast 11/1/23 4/29/24  Mary Sanchez MD   glipiZIDE (GLUCOTROL) 5 mg tablet TAKE 1 TABLET DAILY WITH   BREAKFAST 11/16/23   Mary Sanchez MD   lisinopril (ZESTRIL) 20 mg tablet Take 1 tablet (20 mg total) by mouth daily 5/10/23   Bryson Arango MD   Magnesium Oxide (MAG-200 PO) Take by mouth once a week  Patient not taking: Reported on 6/13/2023    Historical Provider, MD   metoprolol succinate (TOPROL-XL) 25 mg 24 hr tablet Take 0.5 tablets (12.5 mg total) by mouth daily 8/15/23   Bryson Arango MD   Multiple Vitamin (multivitamin) tablet Take 1 tablet by mouth daily Takes when he can remember. Patient not taking: Reported on 10/12/2023    Historical Provider, MD   pancrelipase, Lip-Prot-Amyl, (CREON) 24,000 units Take 24,000 units of lipase by mouth 3 (three) times a day with meals 10/24/23   Flores Gonzalez PA-C   pantoprazole (PROTONIX) 40 mg tablet Take 1 tablet (40 mg total) by mouth daily 10/19/23   Cindy Cadet MD   torsemide (DEMADEX) 20 mg tablet TAKE 0.5 TABLETS (10 MG TOTAL) BY MOUTH DAILY TAKES 10 MG ONCE A DAY.  11/7/23   Bryson Arango MD         Allergies: No Known Allergies    Social History:     Marital Status:    Occupation:   Patient Pre-hospital Living Situation:   Patient Pre-hospital Level of Mobility:   Patient Pre-hospital Diet Restrictions:   Substance Use History:   Social History     Substance and Sexual Activity   Alcohol Use Not Currently    Comment: rare     Social History     Tobacco Use   Smoking Status Some Days    Packs/day: 0.25    Years: 50.00    Total pack years: 12.50    Types: Cigarettes   Smokeless Tobacco Never     Social History     Substance and Sexual Activity   Drug Use No       Family History:    Family History   Problem Relation Age of Onset    Heart attack Father     Other Sister         bypass and vlave replacement    Stroke Paternal Uncle     Arrhythmia Neg Hx     Asthma Neg Hx     Clotting disorder Neg Hx     Fainting Neg Hx     Anuerysm Neg Hx     Hypertension Neg Hx         unsure     Hyperlipidemia Neg Hx     Heart failure Neg Hx        Physical Exam:     Vitals:   Blood Pressure: 130/60 (12/04/23 1230)  Pulse: 63 (12/04/23 1230)  Temperature: 97.8 °F (36.6 °C) (12/04/23 1047)  Temp Source: Oral (12/04/23 1047)  Respirations: 20 (12/04/23 1230)  SpO2: 100 % (12/04/23 1230)    Physical Exam    Constitutional: Pt appears comfortable. Not in any acute distress. HENT:   Head: Normocephalic and atraumatic. Eyes: EOM are normal.   Neck: Neck supple. Cardiovascular: Normal rate, regular rhythm, normal heart sounds. Systolic murmur heard. Pulmonary/Chest: Effort normal, air entry b/l equal. No respiratory distress. Pt has no wheezes or crackles. Abdominal: Soft. Non-distended, Non-tender. Bowel sounds are normal.   Musculoskeletal: Normal range of motion. Neurological: awake, alert. Moving all extremities spontaneously. Psychiatric: normal mood and affect. Additional Data:     Lab Results: I have personally reviewed pertinent reports.       Results from last 7 days   Lab Units 12/04/23  1049   WBC Thousand/uL 7.15   HEMOGLOBIN g/dL 7.4*   HEMATOCRIT % 23.8*   PLATELETS Thousands/uL 139*   NEUTROS PCT % 85*   LYMPHS PCT % 10*   MONOS PCT % 4   EOS PCT % 1     Results from last 7 days Lab Units 12/04/23  1049   SODIUM mmol/L 139   POTASSIUM mmol/L 4.0   CHLORIDE mmol/L 107   CO2 mmol/L 23   BUN mg/dL 64*   CREATININE mg/dL 2.93*   ANION GAP mmol/L 9   CALCIUM mg/dL 8.6   ALBUMIN g/dL 3.4*   TOTAL BILIRUBIN mg/dL 0.35   ALK PHOS U/L 58   ALT U/L 5*   AST U/L 8*   GLUCOSE RANDOM mg/dL 240*     Results from last 7 days   Lab Units 12/04/23  1049   INR  1.03                   Imaging: I have personally reviewed pertinent reports. No orders to display       EKG, Pathology, and Other Studies Reviewed on Admission:   EKG:     [facility] Yes     ** Please Note: This note has been constructed using a voice recognition system.  **

## 2023-12-04 NOTE — ASSESSMENT & PLAN NOTE
Lab Results   Component Value Date    EGFR 19 12/04/2023    EGFR 26 08/31/2023    EGFR 27 07/07/2023    CREATININE 2.93 (H) 12/04/2023    CREATININE 2.26 (H) 08/31/2023    CREATININE 2.17 (H) 07/07/2023     Creatinine 2.9 on admission. Baseline around 2.2  Suspect SHOAIB secondary to GI bleed. We will hold lisinopril and diuretic for now. Monitor hemoglobin with transfusion.

## 2023-12-04 NOTE — ASSESSMENT & PLAN NOTE
Patient presenting with melena and bright red bleeding per rectum intermittently for the last 2-1/2 days. No abdominal pain  On aspirin and Plavix for cardiac history/TAVR. Hold for now. No additional NSAID use. Patient hemodynamically stable. Started on Protonix drip. Continue. GI consulted. Keep n.p.o. for now.

## 2023-12-04 NOTE — PLAN OF CARE
Problem: PAIN - ADULT  Goal: Verbalizes/displays adequate comfort level or baseline comfort level  Description: Interventions:  - Encourage patient to monitor pain and request assistance  - Assess pain using appropriate pain scale  - Administer analgesics based on type and severity of pain and evaluate response  - Implement non-pharmacological measures as appropriate and evaluate response  - Consider cultural and social influences on pain and pain management  - Notify physician/advanced practitioner if interventions unsuccessful or patient reports new pain  Outcome: Progressing     Problem: INFECTION - ADULT  Goal: Absence or prevention of progression during hospitalization  Description: INTERVENTIONS:  - Assess and monitor for signs and symptoms of infection  - Monitor lab/diagnostic results  - Monitor all insertion sites, i.e. indwelling lines, tubes, and drains  - Monitor endotracheal if appropriate and nasal secretions for changes in amount and color  - [place] appropriate cooling/warming therapies per order  - Administer medications as ordered  - Instruct and encourage patient and family to use good hand hygiene technique  - Identify and instruct in appropriate isolation precautions for identified infection/condition  Outcome: Progressing  Goal: Absence of fever/infection during neutropenic period  Description: INTERVENTIONS:  - Monitor WBC    Outcome: Progressing

## 2023-12-04 NOTE — ED PROVIDER NOTES
History  Chief Complaint   Patient presents with    Black or Bloody Stool     Pt reports bloody stool for the past two days, also c/o weakness     This is an 17-year-old male patient who takes Plavix has noticed black tarry stools followed by bright red blood for the last 2 days today when he woke up he had a sense of weakness and intermittent dizziness none room spinning. When he lays flat he has no symptoms when he gets up from a laying down position he does get intermittently lightheaded. He feels rundown. He denies any fever chills headache blurred vision of incontinence or sore throat no chest pain or shortness of breath no nausea vomiting diarrhea or abdominal pain. No urinary symptoms. Parental diagnosis includes not limited to GI bleed, electrode abnormality, ACS, CVA not likely        Prior to Admission Medications   Prescriptions Last Dose Informant Patient Reported? Taking? Ascorbic Acid (vitamin C) 100 MG tablet  Self Yes No   Sig: Take 250 mg by mouth daily   Patient not taking: Reported on 10/12/2023   Cyanocobalamin (VITAMIN B12 PO)  Self Yes No   Sig: Take by mouth once a week   Patient not taking: Reported on 6/13/2023   D3-50 1.25 MG (90327 UT) capsule   No No   Sig: TAKE 1 CAPSULE ONCE WEEKLY   Empagliflozin (Jardiance) 10 MG TABS tablet  Self No No   Sig: Take 1 tablet (10 mg total) by mouth in the morning   Magnesium Oxide (MAG-200 PO)  Self Yes No   Sig: Take by mouth once a week   Patient not taking: Reported on 6/13/2023   Multiple Vitamin (multivitamin) tablet  Self Yes No   Sig: Take 1 tablet by mouth daily Takes when he can remember.    Patient not taking: Reported on 10/12/2023   acetaminophen (TYLENOL) 325 mg tablet  Self No No   Sig: Take 3 tablets (975 mg total) by mouth every 8 (eight) hours   Patient not taking: Reported on 6/13/2023   allopurinol (ZYLOPRIM) 300 mg tablet   No No   Sig: TAKE 1 TABLET DAILY   amLODIPine (NORVASC) 10 mg tablet   No No   Sig: TAKE 1 TABLET DAILY aspirin (ECOTRIN LOW STRENGTH) 81 mg EC tablet  Self Yes No   Sig: Take 81 mg by mouth daily   atorvastatin (LIPITOR) 80 mg tablet  Self No No   Sig: TAKE 1 TABLET DAILY AT     BEDTIME   calcitriol (ROCALTROL) 0.25 mcg capsule  Self No No   Sig: Take 1 capsule (0.25 mcg total) by mouth daily   clopidogrel (PLAVIX) 75 mg tablet  Self No No   Sig: Take 1 tablet (75 mg total) by mouth daily   docusate sodium (COLACE) 100 mg capsule  Self No No   Sig: Take 1 capsule (100 mg total) by mouth 2 (two) times a day   Patient not taking: Reported on 11/16/2023   glimepiride (AMARYL) 1 mg tablet  Self No No   Sig: Take 1 tablet (1 mg total) by mouth daily with breakfast   glipiZIDE (GLUCOTROL) 5 mg tablet   No No   Sig: TAKE 1 TABLET DAILY WITH   BREAKFAST   lisinopril (ZESTRIL) 20 mg tablet  Self No No   Sig: Take 1 tablet (20 mg total) by mouth daily   metoprolol succinate (TOPROL-XL) 25 mg 24 hr tablet  Self No No   Sig: Take 0.5 tablets (12.5 mg total) by mouth daily   pancrelipase, Lip-Prot-Amyl, (CREON) 24,000 units  Self No No   Sig: Take 24,000 units of lipase by mouth 3 (three) times a day with meals   pantoprazole (PROTONIX) 40 mg tablet  Self No No   Sig: Take 1 tablet (40 mg total) by mouth daily   torsemide (DEMADEX) 20 mg tablet  Self No No   Sig: TAKE 0.5 TABLETS (10 MG TOTAL) BY MOUTH DAILY TAKES 10 MG ONCE A DAY.       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 ([address]) 2/24/2023    Basal cell carcinoma     right cheek    CAD (coronary artery disease)     Carotid stenosis, asymptomatic, bilateral     Chronic kidney disease     Colon polyp     Diabetes ([address])     type 2, non-insulin dependent    Diabetes mellitus (HCC)     GERD (gastroesophageal reflux disease)     History of nephrolithiasis     Hyperlipidemia     Hypertension     Left foot pain 11/30/2022    PAD (peripheral artery disease) (HCC)     Severe aortic stenosis     Squamous cell skin cancer 11/22/2022    left superior helix       Past Surgical History:   Procedure Laterality Date    APPENDECTOMY      CARDIAC CATHETERIZATION N/A 05/05/2022    Procedure: CARDIAC RHC/LHC; Surgeon: Dilshad Hinojosa DO;  Location: [facility]; Service: Cardiology    CARDIAC CATHETERIZATION N/A 05/05/2022    Procedure: Cardiac Coronary Angiogram;  Surgeon: Dilshad Hinojosa DO;  [facility]; Service: Cardiology    CARDIAC CATHETERIZATION N/A 05/24/2022    Procedure: Cardiac pci;  Surgeon: Elenita Mendenhall MD;  Location: [facility];   Service: Cardiology    CARDIAC CATHETERIZATION N/A 06/14/2022    Procedure: CARDIAC TAVR;  Surgeon: Chery Foster MD;  Location: [facility];  Service: Cardiology    COLECTOMY      COLONOSCOPY  2013    CORONARY ARTERY BYPASS GRAFT  2013    X 2    FEMORAL ARTERY - POPLITEAL ARTERY BYPASS GRAFT      HEMORRHOID SURGERY      IR AORTAGRAM WITH RUN-OFF  11/19/2018    IR AORTAGRAM WITH RUN-OFF  3/2/2023    IR LOWER EXTREMITY ANGIOGRAM  3/23/2023    MOHS SURGERY Left 01/11/2023    SCC left superior helix-Dr Tovar    CT SLCTV CATHJ 3RD+ ORD SLCTV ABDL PEL/LXTR [facility] Left 08/12/2016    Procedure: LEFT FEMORAL ARTERIOGRAM; BALLOON ANGIOPLASTY; SFA  AND FEMORAL AT VEIN GRAFT;  Surgeon: Bettyjane Sacks, MD;  Location: [facility];  Service: Vascular    CT TEAEC W/WO PATCH GRAFT COMMON FEMORAL Right 3/23/2023    Procedure: Common femoral endarterectomy&antegrade intervention of SFA, popliteal artery w/ shockwave;  Surgeon: Pee Fu MD;  Location: [facility];  Service: Vascular    CT TRANSCATHETER TRANSAPICAL REPLACEMT AORTIC VALVE N/A 06/14/2022    Procedure: REPLACEMENT AORTIC VALVE TRANSCATHETER (TAVR) TRANSAPICAL 29MM IRVIN KYLER S3 ULTRA VALVE(ACCESS ON LEFT) ELANA;  Surgeon: Hodan Grajeda DO;  Location: [facility];  Service: Cardiac Surgery    SKIN CANCER EXCISION      TONSILLECTOMY AND ADENOIDECTOMY         Family History   Problem Relation Age of Onset    Heart attack Father     Other Sister         bypass and vlave replacement    Stroke Paternal Uncle     Arrhythmia Neg Hx     Asthma Neg Hx     Clotting disorder Neg Hx     Fainting Neg Hx     Anuerysm Neg Hx     Hypertension Neg Hx         unsure     Hyperlipidemia Neg Hx     Heart failure Neg Hx      I have reviewed and agree with the history as documented. E-Cigarette/Vaping    E-Cigarette Use Never User      E-Cigarette/Vaping Substances    Nicotine No     THC No     CBD No     Flavoring No     Other No     Unknown No      Social History     Tobacco Use    Smoking status: Some Days     Packs/day: 0.25     Years: 50.00     Total pack years: 12.50     Types: Cigarettes    Smokeless tobacco: Never   Vaping Use    Vaping Use: Never used   Substance Use Topics    Alcohol use: Not Currently     Comment: rare    Drug use: No       Review of Systems   Constitutional:  Negative for diaphoresis, fatigue and fever. HENT:  Negative for congestion, ear pain, nosebleeds and sore throat. Eyes:  Negative for photophobia, pain, discharge and visual disturbance. Respiratory:  Negative for cough, choking, chest tightness, shortness of breath and wheezing. Cardiovascular:  Negative for chest pain and palpitations. Gastrointestinal:  Negative for abdominal distention, abdominal pain, diarrhea and vomiting. Genitourinary:  Negative for dysuria, flank pain and frequency. Musculoskeletal:  Negative for back pain, gait problem and joint swelling. Skin:  Negative for color change and rash. Neurological:  Positive for dizziness, weakness and light-headedness. Negative for tremors, seizures, syncope, facial asymmetry, speech difficulty, numbness and headaches.    Psychiatric/Behavioral:  Negative for behavioral problems and confusion. The patient is not nervous/anxious. All other systems reviewed and are negative. Physical Exam  Physical Exam  Exam conducted with a chaperone present. Genitourinary:     Rectum: Guaiac result positive. No tenderness, anal fissure or external hemorrhoid. Normal anal tone.       Comments: Black stool and bright red blood        Vital Signs  ED Triage Vitals [12/04/23 1047]   Temperature Pulse Respirations Blood Pressure SpO2   97.8 °F (36.6 °C) 82 20 120/56 100 %      Temp Source Heart Rate Source Patient Position - Orthostatic VS BP Location FiO2 (%)   Oral Monitor Sitting Right arm --      Pain Score       --           Vitals:    12/04/23 1047   BP: 120/56   Pulse: 82   Patient Position - Orthostatic VS: Sitting         Visual Acuity      ED Medications  Medications   pantoprazole (PROTONIX) 80 mg in sodium chloride 0.9 % 100 mL infusion (has no administration in time range)   pantoprazole (PROTONIX) 80 mg in sodium chloride 0.9 % 100 mL IVPB (has no administration in time range)       Diagnostic Studies  Results Reviewed       Procedure Component Value Units Date/Time    Comprehensive metabolic panel [[id number]]  (Abnormal) Collected: 12/04/23 1049    Lab Status: Final result Specimen: Blood from Arm, Right Updated: 12/04/23 1211     Sodium 139 mmol/L      Potassium 4.0 mmol/L      Chloride 107 mmol/L      CO2 23 mmol/L      ANION GAP 9 mmol/L      BUN 64 mg/dL      Creatinine 2.93 mg/dL      Glucose 240 mg/dL      Calcium 8.6 mg/dL      Corrected Calcium 9.1 mg/dL      AST 8 U/L      ALT 5 U/L      Alkaline Phosphatase 58 U/L      Total Protein 5.8 g/dL      Albumin 3.4 g/dL      Total Bilirubin 0.35 mg/dL      eGFR 19 ml/min/1.73sq m     Narrative:      [place] guidelines for Chronic Kidney Disease (CKD):     Stage 1 with normal or high GFR (GFR > 90 mL/min/1.73 square meters)    Stage 2 Mild CKD (GFR = 60-89 mL/min/1.73 square meters)    Stage 3A Moderate CKD (GFR = 45-59 mL/min/1.73 square meters)    Stage 3B Moderate CKD (GFR = 30-44 mL/min/1.73 square meters)    Stage 4 Severe CKD (GFR = 15-29 mL/min/1.73 square meters)    Stage 5 End Stage CKD (GFR <15 mL/min/1.73 square meters)  Note: GFR calculation is accurate only with a steady state creatinine    Lipase [[id number]]  (Abnormal) Collected: 12/04/23 1049    Lab Status: Final result Specimen: Blood from Arm, Right Updated: 12/04/23 1211     Lipase <6 u/L     HS Troponin I 4hr [[id number]]     Lab Status: No result Specimen: Blood     HS Troponin I 2hr [[id number]]     Lab Status: No result Specimen: Blood     HS Troponin 0hr (reflex protocol) [[id number]]  (Normal) Collected: 12/04/23 1112    Lab Status: Final result Specimen: Blood from Arm, Right Updated: 12/04/23 1144     hs TnI 0hr 18 ng/L     APTT [[id number]]     Lab Status: No result Specimen: Blood     [place] draw [[id number]] Collected: 12/04/23 1049    Lab Status: In process Specimen: Blood from Arm, Right Updated: 12/04/23 1141    Narrative: The following orders were created for panel order [place] draw. Procedure                               Abnormality         Status                     ---------                               -----------         ------                     Leslie Ronde Top on AWNE[[id number]]                           In process                 Green / Black tube on KGUQ[[id number]]                                                  Lavender Top 7ml on CJLL[[id number]]                                                      Please view results for these tests on the individual orders.     Protime-INR [[id number]]     Lab Status: No result Specimen: Blood     CBC and differential [[id number]]  (Abnormal) Collected: 12/04/23 1049    Lab Status: Final result Specimen: Blood from Arm, Right Updated: 12/04/23 1123     WBC 7.15 Thousand/uL      RBC 2.40 Million/uL      Hemoglobin 7.4 g/dL      Hematocrit 23.8 %      MCV 99 fL      MCH 30.8 pg      MCHC 31.1 g/dL      RDW 15.5 %      MPV 11.0 fL      Platelets 251 Thousands/uL      nRBC 0 /100 WBCs      Neutrophils Relative 85 %      Immat GRANS % 0 %      Lymphocytes Relative 10 %      Monocytes Relative 4 %      Eosinophils Relative 1 %      Basophils Relative 0 %      Neutrophils Absolute 6.00 Thousands/µL      Immature Grans Absolute 0.03 Thousand/uL      Lymphocytes Absolute 0.72 Thousands/µL      Monocytes Absolute 0.28 Thousand/µL      Eosinophils Absolute 0.10 Thousand/µL      Basophils Absolute 0.02 Thousands/µL                    No orders to display              Procedures  Procedures         ED Course  ED Course as of 12/04/23 1224   Mon Dec 04, 2023   1211 Creatinine(!): 2.93   1211 Hemoglobin(!): 7.4                                             Medical Decision Making  This is an 64-year-old male patient started 2 days ago with black tarry stools and bright red blood and today woke up with some weakness and some lightheadedness. No chest pain or shortness of breath no blurred vision double vision or headache. Nothing makes it better or worse. States he feels that his hemoglobin is low. He is a bit pale. Differential diagnose includes not limited to GI bleed, electrolyte, ACS, CVA unlikely    Problems Addressed:  Acute-on-chronic kidney injury : acute illness or injury     Details: Patient has chronic kidney disease and his creatinine went up 5.5 today. He is being admitted  GI bleed: acute illness or injury     Details: Patient has low hemoglobin 7.4 I did have a positive urine test which was both bloody and black. I did consent him for blood work and was going to hang 2 units  but the medicine doctor upon admission Dr. Elba Donato requested 1 unit to start. I also consulted GI    Amount and/or Complexity of Data Reviewed  External Data Reviewed: labs and notes. Details: I did review previous labs and noticed to gain trending past medical history  Labs: ordered.  Decision-making details documented in ED Course. Details: I personally reviewed and interpreted blood work he had an acute kidney injury and was symptomatically anemic  ECG/medicine tests: ordered. Decision-making details documented in ED Course. Details: Initial EKG interpreted by me 6 4 bpm first-degree AV block infrequent unifocal PVCs normal axis QTc 464  Discussion of management or test interpretation with external provider(s): Using joint decision-making with the patient, medicine and GI patient will be admitted to the hospital for further treatment evaluation of his GI bleed. Risk  Decision regarding hospitalization. Disposition  Final diagnoses:   GI bleed   Acute-on-chronic kidney injury      Time reflects when diagnosis was documented in both MDM as applicable and the Disposition within this note       Time User Action Codes Description Comment    12/4/2023 11:42 AM Zackery Alonso [K92.2] GI bleed     12/4/2023 12:14 PM Zackery Alonso [N17.9,  N18.9] Acute-on-chronic kidney injury            ED Disposition       ED Disposition   Admit    Condition   Stable    Date/Time   Mon Dec 4, 2023 12:15 PM    Comment   Case was discussed with  and the patient's admission status was agreed to be Admission Status: inpatient status to the service of Dr. Jakub Mckeon . Follow-up Information    None         Patient's Medications   Discharge Prescriptions    No medications on file       No discharge procedures on file.     PDMP Review         Value Time User    PDMP Reviewed  Yes 6/17/2022 12:05 PM Li Winslow PA-C            ED Provider  Electronically Signed by             Erick Chakraborty PA-C  12/04/23 2881

## 2023-12-04 NOTE — ASSESSMENT & PLAN NOTE
Blood pressure stable. Continue amlodipine, Toprol-XL.   Hold lisinopril and torsemide because of SHOAIB

## 2023-12-04 NOTE — ED ATTENDING ATTESTATION
12/4/2023  IBrian MD, saw and evaluated the patient. I have discussed the patient with the resident/non-physician practitioner and agree with the resident's/non-physician practitioner's findings, Plan of Care, and MDM as documented in the resident's/non-physician practitioner's note, except where noted. All available labs and Radiology studies were reviewed. I was present for key portions of any procedure(s) performed by the resident/non-physician practitioner and I was immediately available to provide assistance. At this point I agree with the current assessment done in the Emergency Department. I have conducted an independent evaluation of this patient a history and physical is as follows: This is an 78-year-old male patient with a relevant past medical history of CAD, on Plavix, presenting to the ED today for complaint of bright red blood per rectum as well as tarry stools over the past 2 days. Patient states that he has had intermittent generalized weakness as well as some dizziness, and felt like he was going to lose consciousness. He states that when he lays flat he does not have any symptoms. He otherwise denies any significantly associated issues. He does have a history of diabetes, and has not taken his glimepiride this morning, his blood sugar on initial presentation was close to 300. He has not had any nausea vomiting diarrhea, and is not a drinker. He is a smoker however. His exam is for the most part unremarkable. He is nontachycardic, nontachypneic, with a normal blood pressure and without any tenderness in his abdomen. His stool guaiac is positive. His differential diagnosis includes: Upper GI bleed versus lower GI bleed versus mixed picture versus other. Patient has a CBC showing a hemoglobin of 7.4, with his previous baseline being in the 11 range. He has a slight elevation in his BUN and creatinine, not reaching an SHOAIB.   He was typed and screened, ordered 2 units of blood, and we spoke with gastroenterology who excepted to consult, will be taking him to endoscopy today. Hospitalist then accepted the patient onto their service primarily without any further orders requested. ED Course         Critical Care Time  CriticalCare Time    Date/Time: 12/4/2023 12:43 PM    Performed by: Marnie Nava MD  Authorized by:  Marnie Nava MD    Critical care provider statement:     Critical care time (minutes):  45    Critical care time was exclusive of:  Separately billable procedures and treating other patients and teaching time    Critical care was necessary to treat or prevent imminent or life-threatening deterioration of the following conditions:  Circulatory failure    Critical care was time spent personally by me on the following activities:  Ordering and performing treatments and interventions, ordering and review of laboratory studies, ordering and review of radiographic studies, re-evaluation of patient's condition, review of old charts and evaluation of patient's response to treatment

## 2023-12-04 NOTE — ASSESSMENT & PLAN NOTE
Wt Readings from Last 3 Encounters:   11/17/23 78.5 kg (173 lb)   11/16/23 78 kg (172 lb)   11/15/23 78 kg (172 lb)       Appears euvolemic.   Hold torsemide for now because of SHOAIB

## 2023-12-04 NOTE — CONSULTS
Consultation - [address]  Lana Galloway. 78 y.o. male MRN: [id number]  Unit/Bed#: ED-05 Encounter: [id number]        Inpatient consult to gastroenterology  Consult performed by: Ang Kelly PA-C  Consult ordered by: Therese Lemon PA-C        Reason for Consult / Principal Problem: GI Bleed    ASSESSMENT and PLAN:    Active Problems: There are no active Hospital Problems. Anemia  Melena  Hematochezia  GERD  Barretts Esophagus  Exocrine Pancreatic Insufficiency. Follow hemoglobin transfuse as needed  -Hold Plavix (last dose Roland 12/3/23)  -From presentation of bleeding one would think it might represent outlet bleeding or limited diverticular bleed however there is a significant pending hemoglobin.  -Patient would likely benefit from both EGD and colonoscopy at this time with regard to his mixed presentation of melena as well as hematochezia. The timing of these procedures will be further discussed with my attending due to last Plavix dose being yesterday. I did also speak to Dr. Sabrina Cronin.    -------------------------------------------------------------------------------------------------------------------    HPI: This is a 78-year-old white male new to me with past medical history of chronic kidney disease stage III-IV with an acute kidney injury, coronary artery disease, severe aortic stenosis status post TAVR on Plavix with last dose on Roland, December 3, 2023, carotid stenosis, chronic kidney disease, history of colon polyps, history of colonic ischemia, diabetes type 2, GERD, Batista's, exocrine pancreatic insufficiency, history of kidney stones, hyperlipidemia, hypertension, peripheral artery disease and right hemicolectomy secondary to perforated appendicitis who presents to the emergency room with symptomatic anemia. He had reported some dizziness as well as some dark stools as well as red blood in his stool that had started yesterday.   He is on Plavix for history of TAVR. His hemoglobin was found to be 7.4 in the emergency room and was 11.8 several months ago. Digital rectal exam in the emergency room done by the attending who I spoke with did reveal red blood. He does have acute on chronic kidney disease at this time his baseline creatinine tends to range from 2.1-2.3 and currently has a BUN of 64 and creatinine 2.93. Years of known anemia - many  Overt bleeding (eg  vomiting blood, coughing blood, urinating blood, black or bloody stools) - both dark (on oral iron) and red blood noted  History of bariatric surgery - No  History of bowel resection - Right dena-colectomy for perforated appendicitis  History of blood transfusions -none recently  History of Iron supplementation -none previously  Vegetarian - No  Menstrual Cycle - N/A    He denies abdominal pain or nausea or vomiting. Vital signs are stable. No leukocytosis. Endoscopic History:  EGD - likely around 2018  Colonoscopy -December 21, 2021 done for colitis showed some linear ulcerations around the cecum which showed active inflammation but no history of chronicity or inflammatory bowel. Likely more consistent with ischemic colitis. Right hemicolectomy (s/p perforated appendicitis) noted with left-sided diverticulosis and grade 2 internal hemorrhoids    REVIEW OF SYSTEMS:    CONSTITUTIONAL: Denies any fever, chills, or rigors. Good appetite, and no recent weight loss. HEENT: No earache or tinnitus. Denies hearing loss or visual disturbances. CARDIOVASCULAR: No chest pain or palpitations. RESPIRATORY: Denies any cough, hemoptysis, shortness of breath or dyspnea on exertion. GASTROINTESTINAL: As noted in the History of Present Illness. GENITOURINARY: No problems with urination. Denies any hematuria or dysuria. NEUROLOGIC: No dizziness or vertigo, denies headaches. MUSCULOSKELETAL: Denies any muscle or joint pain. SKIN: Denies skin rashes or itching.    ENDOCRINE: Denies excessive thirst. Denies intolerance to heat or cold. PSYCHOSOCIAL: Denies depression or anxiety. Denies any recent memory loss. 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 ([address]) 2/24/2023    Basal cell carcinoma     right cheek    CAD (coronary artery disease)     Carotid stenosis, asymptomatic, bilateral     Chronic kidney disease     Colon polyp     Diabetes ([address])     type 2, non-insulin dependent    Diabetes mellitus (HCC)     GERD (gastroesophageal reflux disease)     History of nephrolithiasis     Hyperlipidemia     Hypertension     Left foot pain 11/30/2022    PAD (peripheral artery disease) (HCC)     Severe aortic stenosis     Squamous cell skin cancer 11/22/2022    left superior helix     Past Surgical History:   Procedure Laterality Date    APPENDECTOMY      CARDIAC CATHETERIZATION N/A 05/05/2022    Procedure: CARDIAC RHC/LHC; Surgeon: Petey Guerrero DO;  Location: [facility]; Service: Cardiology    CARDIAC CATHETERIZATION N/A 05/05/2022    Procedure: Cardiac Coronary Angiogram;  Surgeon: Petey Guerrero DO;  Location: [facility]; Service: Cardiology    CARDIAC CATHETERIZATION N/A 05/24/2022    Procedure: Cardiac pci;  Surgeon: Flor Alonzo MD;  Location: [facility];   Service: Cardiology    CARDIAC CATHETERIZATION N/A 06/14/2022    Procedure: CARDIAC TAVR;  Surgeon: Live Ramirez MD;  Location: [facility];  Service: Cardiology    COLECTOMY      COLONOSCOPY  2013    CORONARY ARTERY BYPASS GRAFT  2013    X 2    FEMORAL ARTERY - POPLITEAL ARTERY BYPASS GRAFT      HEMORRHOID SURGERY      IR AORTAGRAM WITH RUN-OFF  11/19/2018    IR AORTAGRAM WITH RUN-OFF  3/2/2023    IR LOWER EXTREMITY ANGIOGRAM  3/23/2023    MOHS SURGERY Left 01/11/2023    SCC left superior Maryam Tovar    HI SLCTV CATHJ 3RD+ ORD SLCTV ABDL PEL/LXTR [facility] Left 08/12/2016    Procedure: LEFT FEMORAL ARTERIOGRAM; BALLOON ANGIOPLASTY; SFA  AND FEMORAL AT VEIN GRAFT;  Surgeon: Dipika Noriega MD;  Location: [facility];  Service: Vascular    HI TEAEC W/WO PATCH GRAFT COMMON FEMORAL Right 3/23/2023    Procedure: Common femoral endarterectomy&antegrade intervention of SFA, popliteal artery w/ shockwave;  Surgeon: Latisha Cheng MD;  Location: [facility];  Service: Vascular    HI TRANSCATHETER TRANSAPICAL REPLACEMT AORTIC VALVE N/A 06/14/2022    Procedure: REPLACEMENT AORTIC VALVE TRANSCATHETER (TAVR) TRANSAPICAL 29MM IRVIN KYLER S3 ULTRA VALVE(ACCESS ON LEFT) ELANA;  Surgeon: Lilian Carver DO;  Location: [facility];  Service: Cardiac Surgery    SKIN CANCER EXCISION      TONSILLECTOMY AND ADENOIDECTOMY       Social History   Social History     Substance and Sexual Activity   Alcohol Use Not Currently    Comment: rare     Social History     Substance and Sexual Activity   Drug Use No     Social History     Tobacco Use   Smoking Status Some Days    Packs/day: 0.25    Years: 50.00    Total pack years: 12.50    Types: Cigarettes   Smokeless Tobacco Never     Family History   Problem Relation Age of Onset    Heart attack Father     Other Sister         bypass and vlave replacement    Stroke Paternal Uncle     Arrhythmia Neg Hx     Asthma Neg Hx     Clotting disorder Neg Hx     Fainting Neg Hx     Anuerysm Neg Hx     Hypertension Neg Hx         unsure     Hyperlipidemia Neg Hx     Heart failure Neg Hx        Meds/Allergies     (Not in a hospital admission)    No current facility-administered medications for this encounter. No Known Allergies        Objective     Blood pressure 120/56, pulse 82, temperature 97.8 °F (36.6 °C), temperature source Oral, resp. rate 20, SpO2 100 %.     No intake or output data in the 24 hours ending 12/04/23 1159      PHYSICAL EXAM:      General Appearance:   Alert, cooperative, no distress, appears stated age    HEENT:   Normocephalic, atraumatic, sclera anicteric   Neck:  Supple, symmetrical   Lungs:   Clear to auscultation bilaterally; no rales, rhonchi or wheezing; respirations unlabored    Heart[de-identified]   S1 and S2 normal; regular rate and rhythm; no murmur, rub, or gallop. Abdomen:   Soft, non-tender, non-distended; normal bowel sounds; no masses, no organomegaly    Genitalia:   Deferred    Rectal:   Deferred    Extremities:  No cyanosis, clubbing or edema    Pulses:  2+ and symmetric all extremities    Skin:  Skin color, texture normal, no rashes or lesions    Lymph nodes:  Not assessed  Neuro: alert and oriented x 3  Psych: normal behavior       Lab Results:   Results from last 7 days   Lab Units 12/04/23  1049   WBC Thousand/uL 7.15   HEMOGLOBIN g/dL 7.4*   HEMATOCRIT % 23.8*   PLATELETS Thousands/uL 139*   NEUTROS PCT % 85*   LYMPHS PCT % 10*   MONOS PCT % 4   EOS PCT % 1           Invalid input(s): "LABALBU"            Imaging Studies: I have personally reviewed pertinent imaging studies. No results found. Patient was seen and examined by Dr. Jaime Matamoros. All mosley medical decisions were made by Dr. Jaime Matamoros. Thank you for allowing us to participate in the care of this present patient. We will follow-up with you closely. Harjeet Burton PA-C

## 2023-12-04 NOTE — ASSESSMENT & PLAN NOTE
Hemoglobin 7.4 on admission. 3 months ago was 11.8. Secondary to GI bleed. We will transfuse 1 unit PRBC for now. Monitor hemoglobin. Transfuse for hemoglobin less than 7.

## 2023-12-05 ENCOUNTER — APPOINTMENT (INPATIENT)
Dept: GASTROENTEROLOGY | Facility: HOSPITAL | Age: 78
DRG: 378 | End: 2023-12-05
Payer: MEDICARE

## 2023-12-05 ENCOUNTER — ANESTHESIA (INPATIENT)
Dept: GASTROENTEROLOGY | Facility: HOSPITAL | Age: 78
DRG: 378 | End: 2023-12-05
Payer: MEDICARE

## 2023-12-05 ENCOUNTER — ANESTHESIA EVENT (INPATIENT)
Dept: GASTROENTEROLOGY | Facility: HOSPITAL | Age: 78
DRG: 378 | End: 2023-12-05
Payer: MEDICARE

## 2023-12-05 LAB
ABO GROUP BLD BPU: NORMAL
ANION GAP SERPL CALCULATED.3IONS-SCNC: 5 MMOL/L
BPU ID: NORMAL
BUN SERPL-MCNC: 61 MG/DL (ref 5–25)
CALCIUM SERPL-MCNC: 8.3 MG/DL (ref 8.4–10.2)
CHLORIDE SERPL-SCNC: 112 MMOL/L (ref 96–108)
CO2 SERPL-SCNC: 24 MMOL/L (ref 21–32)
CREAT SERPL-MCNC: 2.64 MG/DL (ref 0.6–1.3)
CROSSMATCH: NORMAL
ERYTHROCYTE [DISTWIDTH] IN BLOOD BY AUTOMATED COUNT: 15.9 % (ref 11.6–15.1)
GFR SERPL CREATININE-BSD FRML MDRD: 21 ML/MIN/1.73SQ M
GLUCOSE SERPL-MCNC: 103 MG/DL (ref 65–140)
GLUCOSE SERPL-MCNC: 122 MG/DL (ref 65–140)
GLUCOSE SERPL-MCNC: 218 MG/DL (ref 65–140)
GLUCOSE SERPL-MCNC: 88 MG/DL (ref 65–140)
GLUCOSE SERPL-MCNC: 90 MG/DL (ref 65–140)
HCT VFR BLD AUTO: 22.3 % (ref 36.5–49.3)
HCT VFR BLD AUTO: 24.9 % (ref 36.5–49.3)
HGB BLD-MCNC: 7 G/DL (ref 12–17)
HGB BLD-MCNC: 8.1 G/DL (ref 12–17)
MCH RBC QN AUTO: 30.7 PG (ref 26.8–34.3)
MCHC RBC AUTO-ENTMCNC: 31.4 G/DL (ref 31.4–37.4)
MCV RBC AUTO: 98 FL (ref 82–98)
PLATELET # BLD AUTO: 116 THOUSANDS/UL (ref 149–390)
PMV BLD AUTO: 10.6 FL (ref 8.9–12.7)
POTASSIUM SERPL-SCNC: 4 MMOL/L (ref 3.5–5.3)
RBC # BLD AUTO: 2.28 MILLION/UL (ref 3.88–5.62)
SODIUM SERPL-SCNC: 141 MMOL/L (ref 135–147)
UNIT DISPENSE STATUS: NORMAL
UNIT PRODUCT CODE: NORMAL
UNIT PRODUCT VOLUME: 300 ML
UNIT RH: NORMAL
WBC # BLD AUTO: 4.15 THOUSAND/UL (ref 4.31–10.16)

## 2023-12-05 PROCEDURE — 85014 HEMATOCRIT: CPT | Performed by: PHYSICIAN ASSISTANT

## 2023-12-05 PROCEDURE — 85018 HEMOGLOBIN: CPT | Performed by: PHYSICIAN ASSISTANT

## 2023-12-05 PROCEDURE — 85027 COMPLETE CBC AUTOMATED: CPT | Performed by: INTERNAL MEDICINE

## 2023-12-05 PROCEDURE — 99233 SBSQ HOSP IP/OBS HIGH 50: CPT | Performed by: PHYSICIAN ASSISTANT

## 2023-12-05 PROCEDURE — 80048 BASIC METABOLIC PNL TOTAL CA: CPT | Performed by: INTERNAL MEDICINE

## 2023-12-05 PROCEDURE — 0W3P8ZZ CONTROL BLEEDING IN GASTROINTESTINAL TRACT, VIA NATURAL OR ARTIFICIAL OPENING ENDOSCOPIC: ICD-10-PCS | Performed by: INTERNAL MEDICINE

## 2023-12-05 PROCEDURE — 43255 EGD CONTROL BLEEDING ANY: CPT | Performed by: INTERNAL MEDICINE

## 2023-12-05 PROCEDURE — 82948 REAGENT STRIP/BLOOD GLUCOSE: CPT

## 2023-12-05 PROCEDURE — C9113 INJ PANTOPRAZOLE SODIUM, VIA: HCPCS | Performed by: INTERNAL MEDICINE

## 2023-12-05 RX ORDER — LIDOCAINE HYDROCHLORIDE 10 MG/ML
INJECTION, SOLUTION EPIDURAL; INFILTRATION; INTRACAUDAL; PERINEURAL AS NEEDED
Status: DISCONTINUED | OUTPATIENT
Start: 2023-12-05 | End: 2023-12-05

## 2023-12-05 RX ORDER — ASCORBIC ACID 500 MG
250 TABLET ORAL DAILY
Status: DISCONTINUED | OUTPATIENT
Start: 2023-12-06 | End: 2023-12-10 | Stop reason: HOSPADM

## 2023-12-05 RX ORDER — SODIUM CHLORIDE, SODIUM LACTATE, POTASSIUM CHLORIDE, CALCIUM CHLORIDE 600; 310; 30; 20 MG/100ML; MG/100ML; MG/100ML; MG/100ML
125 INJECTION, SOLUTION INTRAVENOUS CONTINUOUS
Status: DISCONTINUED | OUTPATIENT
Start: 2023-12-05 | End: 2023-12-06

## 2023-12-05 RX ORDER — METOPROLOL SUCCINATE 25 MG/1
12.5 TABLET, EXTENDED RELEASE ORAL DAILY
Status: DISCONTINUED | OUTPATIENT
Start: 2023-12-06 | End: 2023-12-10 | Stop reason: HOSPADM

## 2023-12-05 RX ORDER — ACETAMINOPHEN 325 MG/1
650 TABLET ORAL EVERY 6 HOURS PRN
Status: DISCONTINUED | OUTPATIENT
Start: 2023-12-05 | End: 2023-12-10 | Stop reason: HOSPADM

## 2023-12-05 RX ORDER — PROPOFOL 10 MG/ML
INJECTION, EMULSION INTRAVENOUS AS NEEDED
Status: DISCONTINUED | OUTPATIENT
Start: 2023-12-05 | End: 2023-12-05

## 2023-12-05 RX ORDER — LANOLIN ALCOHOL/MO/W.PET/CERES
6 CREAM (GRAM) TOPICAL
Status: DISCONTINUED | OUTPATIENT
Start: 2023-12-05 | End: 2023-12-10 | Stop reason: HOSPADM

## 2023-12-05 RX ADMIN — CALCITRIOL 0.25 MCG: 0.25 CAPSULE, LIQUID FILLED ORAL at 10:00

## 2023-12-05 RX ADMIN — SODIUM CHLORIDE, SODIUM LACTATE, POTASSIUM CHLORIDE, AND CALCIUM CHLORIDE 125 ML/HR: .6; .31; .03; .02 INJECTION, SOLUTION INTRAVENOUS at 10:00

## 2023-12-05 RX ADMIN — Medication 6 MG: at 22:24

## 2023-12-05 RX ADMIN — PROPOFOL 20 MG: 10 INJECTION, EMULSION INTRAVENOUS at 16:02

## 2023-12-05 RX ADMIN — ATORVASTATIN CALCIUM 80 MG: 40 TABLET, FILM COATED ORAL at 22:24

## 2023-12-05 RX ADMIN — PROPOFOL 20 MG: 10 INJECTION, EMULSION INTRAVENOUS at 16:05

## 2023-12-05 RX ADMIN — AMLODIPINE BESYLATE 10 MG: 10 TABLET ORAL at 10:00

## 2023-12-05 RX ADMIN — PANTOPRAZOLE SODIUM 8 MG/HR: 40 INJECTION, POWDER, FOR SOLUTION INTRAVENOUS at 18:52

## 2023-12-05 RX ADMIN — PROPOFOL 50 MG: 10 INJECTION, EMULSION INTRAVENOUS at 15:57

## 2023-12-05 RX ADMIN — NICOTINE 1 PATCH: 21 PATCH, EXTENDED RELEASE TRANSDERMAL at 10:00

## 2023-12-05 RX ADMIN — SODIUM CHLORIDE, SODIUM LACTATE, POTASSIUM CHLORIDE, AND CALCIUM CHLORIDE 125 ML/HR: .6; .31; .03; .02 INJECTION, SOLUTION INTRAVENOUS at 17:35

## 2023-12-05 RX ADMIN — PROPOFOL 20 MG: 10 INJECTION, EMULSION INTRAVENOUS at 15:59

## 2023-12-05 RX ADMIN — PANTOPRAZOLE SODIUM 8 MG/HR: 40 INJECTION, POWDER, FOR SOLUTION INTRAVENOUS at 09:45

## 2023-12-05 RX ADMIN — LIDOCAINE HYDROCHLORIDE 50 MG: 10 INJECTION, SOLUTION EPIDURAL; INFILTRATION; INTRACAUDAL; PERINEURAL at 15:52

## 2023-12-05 RX ADMIN — ALLOPURINOL 300 MG: 300 TABLET ORAL at 10:00

## 2023-12-05 RX ADMIN — PANCRELIPASE 24000 UNITS: 24000; 76000; 120000 CAPSULE, DELAYED RELEASE PELLETS ORAL at 16:56

## 2023-12-05 NOTE — ASSESSMENT & PLAN NOTE
Wt Readings from Last 3 Encounters:   12/04/23 78 kg (172 lb)   11/17/23 78.5 kg (173 lb)   11/16/23 78 kg (172 lb)       Appears euvolemic.   Hold torsemide for now because of SHOAIB

## 2023-12-05 NOTE — ASSESSMENT & PLAN NOTE
Lab Results   Component Value Date    HGBA1C 6.7 (H) 08/31/2023       Recent Labs     12/04/23  1347 12/04/23  1637 12/05/23  0748   POCGLU 98 101 90       Blood Sugar Average: Last 72 hrs:  (P) 09.6348562515116689    Hold p.o. meds.   Sliding scale insulin for now  NPO for EGD today

## 2023-12-05 NOTE — ASSESSMENT & PLAN NOTE
Hemoglobin   Date Value Ref Range Status   12/05/2023 7.0 (L) 12.0 - 17.0 g/dL Final   12/04/2023 7.5 (L) 12.0 - 17.0 g/dL Final   12/04/2023 7.4 (L) 12.0 - 17.0 g/dL Final     Hematocrit   Date Value Ref Range Status   12/05/2023 22.3 (L) 36.5 - 49.3 % Final   12/04/2023 22.6 (L) 36.5 - 49.3 % Final   12/04/2023 23.8 (L) 36.5 - 49.3 % Final     Hemoglobin 7.4 on admission. 3 months ago was 11.8. Secondary to GI bleed. Got 1 u pRBC so far  H+H q12h  Transfuse for hemoglobin less than 7.

## 2023-12-05 NOTE — ASSESSMENT & PLAN NOTE
Patient presenting with melena and bright red bleeding per rectum intermittently for the last 2-1/2 days. No abdominal pain  On aspirin and Plavix for cardiac history/TAVR. Hold for now.   Protonix gtt  GI consulted; NPO for EGD today 2:00 pm

## 2023-12-05 NOTE — ASSESSMENT & PLAN NOTE
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 We will hold lisinopril and diuretic for now.   Got aggressive IVF so far

## 2023-12-05 NOTE — ANESTHESIA PREPROCEDURE EVALUATION
Procedure:  EGD    Relevant Problems   CARDIO   (+) CAD (coronary artery disease)   (+) Hyperlipidemia   (+) Primary hypertension   (+) Progressive angina (HCC)   (+) Sinus bradycardia      ENDO   (+) Controlled type 2 diabetes mellitus with stage 4 chronic kidney disease, without long-term current use of insulin (HCC)   (+) Secondary hyperparathyroidism of renal origin (HCC)      GI/HEPATIC   (+) GERD (gastroesophageal reflux disease)   (+) GI bleed      /RENAL   (+) Acute kidney injury superimposed on chronic kidney disease    (+) Acute renal failure superimposed on stage 4 chronic kidney disease (HCC)   (+) Benign hypertension with chronic kidney disease, stage III (HCC)   (+) Benign hypertension with chronic kidney disease, stage IV (HCC)   (+) CKD (chronic kidney disease) stage 4, GFR 15-29 ml/min (HCC)   (+) Renal cyst, left      HEMATOLOGY   (+) Acute blood loss anemia   (+) Anemia due to stage 4 chronic kidney disease    (+) Iron deficiency anemia      NEURO/PSYCH   (+) Atherosclerosis of native arteries of extremities with intermittent claudication, bilateral legs (HCC)   (+) Progressive angina (HCC)   (+) Tension headache      Cardiovascular and Mediastinum   (+) Chronic diastolic CHF (congestive heart failure) (HCC)      Other   (+) S/P TAVR (transcatheter aortic valve replacement)      CABG with PCI of RCA 5/22, AS s/p TAVR 6/22   TTE 6/8/23: LVEF 65%, G1V, bioprosthetic aortic valve    Lab Results   Component Value Date    WBC 4.15 (L) 12/05/2023    HGB 7.0 (L) 12/05/2023    HCT 22.3 (L) 12/05/2023    MCV 98 12/05/2023     (L) 12/05/2023     Lab Results   Component Value Date     11/22/2015    K 4.0 12/05/2023    CO2 24 12/05/2023     (H) 12/05/2023    BUN 61 (H) 12/05/2023    CREATININE 2.64 (H) 12/05/2023     Lab Results   Component Value Date    INR 1.03 12/04/2023    INR 1.10 03/25/2023    INR 1.18 03/23/2023    PROTIME 14.1 12/04/2023    PROTIME 14.2 03/25/2023    PROTIME 15.1 (H) 03/23/2023     Lab Results   Component Value Date    PTT 26 12/04/2023       Lab Results   Component Value Date    GLUCOSE 163 (H) 03/23/2023    GLUCOSE 159 (H) 03/23/2023    GLUCOSE 143 (H) 06/14/2022       Lab Results   Component Value Date    HGBA1C 6.7 (H) 08/31/2023       Type and Screen:  A    Physical Exam    Airway    Mallampati score: II  TM Distance: >3 FB  Neck ROM: full     Dental   Comment: Numerous chipped and broken teeth, none loose per patient     Cardiovascular      Pulmonary      Other Findings        Anesthesia Plan  ASA Score- 3     Anesthesia Type- IV sedation with anesthesia with ASA Monitors. Additional Monitors:     Airway Plan:            Plan Factors-Exercise tolerance (METS): >4 METS. Chart reviewed. Existing labs reviewed. Patient summary reviewed. Induction- intravenous. Postoperative Plan-     Informed Consent- Anesthetic plan and risks discussed with patient. I personally reviewed this patient with the CRNA. Discussed and agreed on the Anesthesia Plan with the CRNA. Aaron Speaker

## 2023-12-05 NOTE — PROGRESS NOTES
[facility]  Progress Note  Name: Jodi Pallas. I  MRN: [id number]  Unit/Bed#: S -01 I Date of Admission: 12/4/2023   Date of Service: 12/5/2023 I Hospital Day: 1    Assessment/Plan   * GI bleed  Assessment & Plan  Patient presenting with melena and bright red bleeding per rectum intermittently for the last 2-1/2 days. No abdominal pain  On aspirin and Plavix for cardiac history/TAVR. Hold for now. Protonix gtt  GI consulted; NPO for EGD today 2:00 pm      Acute blood loss anemia  Assessment & Plan  Hemoglobin   Date Value Ref Range Status   12/05/2023 7.0 (L) 12.0 - 17.0 g/dL Final   12/04/2023 7.5 (L) 12.0 - 17.0 g/dL Final   12/04/2023 7.4 (L) 12.0 - 17.0 g/dL Final     Hematocrit   Date Value Ref Range Status   12/05/2023 22.3 (L) 36.5 - 49.3 % Final   12/04/2023 22.6 (L) 36.5 - 49.3 % Final   12/04/2023 23.8 (L) 36.5 - 49.3 % Final     Hemoglobin 7.4 on admission. 3 months ago was 11.8. Secondary to GI bleed. Got 1 u pRBC so far  H+H q12h  Transfuse for hemoglobin less than 7. 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 We will hold lisinopril and diuretic for now. Got aggressive IVF so far    S/P TAVR (transcatheter aortic valve replacement)  Assessment & Plan  Patient on aspirin and Plavix which will be held for now because of GI bleed. Chronic diastolic CHF (congestive heart failure) (HCC)  Assessment & Plan  Wt Readings from Last 3 Encounters:   12/04/23 78 kg (172 lb)   11/17/23 78.5 kg (173 lb)   11/16/23 78 kg (172 lb)       Appears euvolemic.   Hold torsemide for now because of SHOAIB        Controlled type 2 diabetes mellitus with stage 4 chronic kidney disease, without long-term current use of insulin [facility])  Assessment & Plan  Lab Results   Component Value Date    HGBA1C 6.7 (H) 08/31/2023       Recent Labs     12/04/23  1347 12/04/23  1637 12/05/23  0748   POCGLU 98 101 90       Blood Sugar Average: Last 72 hrs:  (P) [id number]    Hold p.o. meds. Sliding scale insulin for now  NPO for EGD today      CAD (coronary artery disease)  Assessment & Plan  Hold aspirin & Plavix for now because of GI bleed    Primary hypertension  Assessment & Plan  Blood pressure stable. Continue amlodipine, Toprol-XL with hold parameters  Hold lisinopril and torsemide because of SHOAIB             VTE Pharmacologic Prophylaxis: VTE Score: 3 Moderate Risk (Score 3-4) - Pharmacological DVT Prophylaxis Contraindicated. Sequential Compression Devices Ordered. gib    Mobility:   JH-HLM Achieved: 7: Walk 25 feet or more  HLM Goal achieved. Continue to encourage appropriate mobility. Patient Centered Rounds: I performed bedside rounds with nursing staff today. Discussions with Specialists or Other Care Team Provider: none    Education and Discussions with Family / Patient:  patient alone. Total Time Spent on Date of Encounter in care of patient: 55 mins. This time was spent on one or more of the following: performing physical exam; counseling and coordination of care; obtaining or reviewing history; documenting in the medical record; reviewing/ordering tests, medications or procedures; communicating with other healthcare professionals and discussing with patient's family/caregivers.     Current Length of Stay: 1 day(s)  Current Patient Status: Inpatient   Certification Statement: The patient will continue to require additional inpatient hospital stay due to egd today, trend h+H  Discharge Plan: Dc plan pending EGD results today versus the need for colonoscopy as inpatient later in the week    Code Status: Level 1 - Full Code    Subjective:   Minimal BRBPR on his underwear and with wiping after passing gas over the toilet    Feels urgency and pressure like he needs to make a BM but no BM yet since admission    No abd pain    thirsty    Objective:     Vitals:   Temp (24hrs), Av °F (36.7 °C), Min:97.6 °F (36.4 °C), Max:98.4 °F (36.9 °C)    Temp:  [97.6 °F (36.4 °C)-98.4 °F (36.9 °C)] 98.4 °F (36.9 °C)  HR:  [58-65] 58  Resp:  [17-18] 17  BP: (125-165)/(44-81) 127/46  SpO2:  [94 %-100 %] 97 %  Body mass index is 23.76 kg/m². Input and Output Summary (last 24 hours): Intake/Output Summary (Last 24 hours) at 2023 1303  Last data filed at 2023 0501  Gross per 24 hour   Intake 580 ml   Output 300 ml   Net 280 ml       Physical Exam:   Physical Exam  Vitals reviewed. HENT:      Head: Normocephalic and atraumatic. Nose: Nose normal.      Mouth/Throat:      Mouth: Mucous membranes are dry. Eyes:      General: No scleral icterus. Cardiovascular:      Rate and Rhythm: Normal rate and regular rhythm. Pulmonary:      Effort: Pulmonary effort is normal.   Abdominal:      General: Abdomen is flat. Musculoskeletal:         General: No swelling. Cervical back: Normal range of motion. Skin:     Findings: Bruising present. Neurological:      General: No focal deficit present. Mental Status: He is alert. Psychiatric:         Behavior: Behavior normal.          Additional Data:     Labs:  Results from last 7 days   Lab Units 23  0507 23  1750 23  1049   WBC Thousand/uL 4.15*  --  7.15   HEMOGLOBIN g/dL 7.0*   < > 7.4*   HEMATOCRIT % 22.3*   < > 23.8*   PLATELETS Thousands/uL 116*  --  139*   NEUTROS PCT %  --   --  85*   LYMPHS PCT %  --   --  10*   MONOS PCT %  --   --  4   EOS PCT %  --   --  1    < > = values in this interval not displayed.      Results from last 7 days   Lab Units 23  0507 23  1049   SODIUM mmol/L 141 139   POTASSIUM mmol/L 4.0 4.0   CHLORIDE mmol/L 112* 107   CO2 mmol/L 24 23   BUN mg/dL 61* 64*   CREATININE mg/dL 2.64* 2.93*   ANION GAP mmol/L 5 9   CALCIUM mg/dL 8.3* 8.6   ALBUMIN g/dL  --  3.4*   TOTAL BILIRUBIN mg/dL  --  0.35   ALK PHOS U/L  --  58   ALT U/L  --  5*   AST U/L  --  8*   GLUCOSE RANDOM mg/dL 88 240*     Results from last 7 days   Lab Units 12/04/23  1049   INR  1.03     Results from last 7 days   Lab Units 12/05/23  1109 12/05/23  0748 12/04/23  1637 12/04/23  1347   POC GLUCOSE mg/dl 103 90 101 98               Lines/Drains:  Invasive Devices       Peripheral Intravenous Line  Duration             Peripheral IV 12/04/23 Left Forearm 1 day    Peripheral IV 12/04/23 Right Antecubital 1 day                          Imaging: No pertinent imaging reviewed.     Recent Cultures (last 7 days): none        Last 24 Hours Medication List:   Current Facility-Administered Medications   Medication Dose Route Frequency Provider Last Rate    acetaminophen  650 mg Oral Q6H PRN Antonia Abdi PA-C      allopurinol  300 mg Oral Daily Naima Nick MD      amLODIPine  10 mg Oral Daily Naima Nick MD      [START ON 12/6/2023] ascorbic acid  250 mg Oral Daily Antonia Abdi PA-C      atorvastatin  80 mg Oral HS Naima Nick MD      calcitriol  0.25 mcg Oral Daily Naima Nick MD      insulin lispro  1-5 Units Subcutaneous TID AC Naima Nick MD      lactated ringers  125 mL/hr Intravenous Continuous Antonia Abdi PA-C 125 mL/hr (12/05/23 1000)    melatonin  6 mg Oral HS PRN Antonia Abdi PA-C      [START ON 12/6/2023] metoprolol succinate  12.5 mg Oral Daily Antonia Abdi PA-C      [START ON 12/6/2023] multivitamin-minerals  1 tablet Oral Daily Antonia Abdi PA-C      nicotine  1 patch Transdermal Daily Naima Nick MD      pancrelipase (Lip-Prot-Amyl)  24,000 Units Oral TID With Meals Antonia Abdi PA-C      pantoprazole (PROTONIX) 80 mg in sodium chloride 0.9 % 100 mL infusion  8 mg/hr Intravenous Continuous Naima Nick MD 8 mg/hr (12/05/23 0945)        Today, Patient Was Seen By: Dannielle Merino LYN Abdi    **Please Note: This note may have been constructed using a voice recognition system. **

## 2023-12-05 NOTE — ANESTHESIA POSTPROCEDURE EVALUATION
Post-Op Assessment Note    CV Status:  Stable  Pain Score: 0    Pain management: adequate       Mental Status:  Alert and awake   Hydration Status:  Euvolemic   PONV Controlled:  Controlled   Airway Patency:  Patent     Post Op Vitals Reviewed: Yes      Staff: CRNA           /53 (12/05/23 1615)    Temp 97.5 °F (36.4 °C) (12/05/23 1615)    Pulse 64 (12/05/23 1615)   Resp 18 (12/05/23 1615)    SpO2 100 % (12/05/23 1615)

## 2023-12-05 NOTE — ASSESSMENT & PLAN NOTE
Blood pressure stable.   Continue amlodipine, Toprol-XL with hold parameters  Hold lisinopril and torsemide because of SHOAIB

## 2023-12-06 LAB
ALBUMIN SERPL BCP-MCNC: 3.1 G/DL (ref 3.5–5)
ALP SERPL-CCNC: 51 U/L (ref 34–104)
ALT SERPL W P-5'-P-CCNC: 5 U/L (ref 7–52)
ANION GAP SERPL CALCULATED.3IONS-SCNC: 3 MMOL/L
AST SERPL W P-5'-P-CCNC: 11 U/L (ref 13–39)
BILIRUB SERPL-MCNC: 0.42 MG/DL (ref 0.2–1)
BUN SERPL-MCNC: 47 MG/DL (ref 5–25)
CALCIUM ALBUM COR SERPL-MCNC: 9.3 MG/DL (ref 8.3–10.1)
CALCIUM SERPL-MCNC: 8.6 MG/DL (ref 8.4–10.2)
CHLORIDE SERPL-SCNC: 112 MMOL/L (ref 96–108)
CO2 SERPL-SCNC: 26 MMOL/L (ref 21–32)
CREAT SERPL-MCNC: 2.15 MG/DL (ref 0.6–1.3)
ERYTHROCYTE [DISTWIDTH] IN BLOOD BY AUTOMATED COUNT: 15.3 % (ref 11.6–15.1)
GFR SERPL CREATININE-BSD FRML MDRD: 28 ML/MIN/1.73SQ M
GLUCOSE SERPL-MCNC: 105 MG/DL (ref 65–140)
GLUCOSE SERPL-MCNC: 106 MG/DL (ref 65–140)
GLUCOSE SERPL-MCNC: 130 MG/DL (ref 65–140)
GLUCOSE SERPL-MCNC: 198 MG/DL (ref 65–140)
GLUCOSE SERPL-MCNC: 238 MG/DL (ref 65–140)
HCT VFR BLD AUTO: 21.5 % (ref 36.5–49.3)
HGB BLD-MCNC: 7 G/DL (ref 12–17)
MCH RBC QN AUTO: 30.8 PG (ref 26.8–34.3)
MCHC RBC AUTO-ENTMCNC: 31.6 G/DL (ref 31.4–37.4)
MCV RBC AUTO: 97 FL (ref 82–98)
PLATELET # BLD AUTO: 121 THOUSANDS/UL (ref 149–390)
PMV BLD AUTO: 10.7 FL (ref 8.9–12.7)
POTASSIUM SERPL-SCNC: 4 MMOL/L (ref 3.5–5.3)
PROT SERPL-MCNC: 5.1 G/DL (ref 6.4–8.4)
RBC # BLD AUTO: 2.21 MILLION/UL (ref 3.88–5.62)
SODIUM SERPL-SCNC: 141 MMOL/L (ref 135–147)
WBC # BLD AUTO: 3.55 THOUSAND/UL (ref 4.31–10.16)

## 2023-12-06 PROCEDURE — 82948 REAGENT STRIP/BLOOD GLUCOSE: CPT

## 2023-12-06 PROCEDURE — 99233 SBSQ HOSP IP/OBS HIGH 50: CPT | Performed by: PHYSICIAN ASSISTANT

## 2023-12-06 PROCEDURE — C9113 INJ PANTOPRAZOLE SODIUM, VIA: HCPCS | Performed by: PHYSICIAN ASSISTANT

## 2023-12-06 PROCEDURE — 85027 COMPLETE CBC AUTOMATED: CPT | Performed by: PHYSICIAN ASSISTANT

## 2023-12-06 PROCEDURE — C9113 INJ PANTOPRAZOLE SODIUM, VIA: HCPCS | Performed by: INTERNAL MEDICINE

## 2023-12-06 PROCEDURE — 80053 COMPREHEN METABOLIC PANEL: CPT | Performed by: PHYSICIAN ASSISTANT

## 2023-12-06 RX ORDER — SODIUM CHLORIDE, SODIUM GLUCONATE, SODIUM ACETATE, POTASSIUM CHLORIDE, MAGNESIUM CHLORIDE, SODIUM PHOSPHATE, DIBASIC, AND POTASSIUM PHOSPHATE .53; .5; .37; .037; .03; .012; .00082 G/100ML; G/100ML; G/100ML; G/100ML; G/100ML; G/100ML; G/100ML
75 INJECTION, SOLUTION INTRAVENOUS CONTINUOUS
Status: DISCONTINUED | OUTPATIENT
Start: 2023-12-07 | End: 2023-12-07

## 2023-12-06 RX ORDER — PANTOPRAZOLE SODIUM 40 MG/10ML
40 INJECTION, POWDER, LYOPHILIZED, FOR SOLUTION INTRAVENOUS EVERY 12 HOURS SCHEDULED
Status: DISCONTINUED | OUTPATIENT
Start: 2023-12-06 | End: 2023-12-10 | Stop reason: HOSPADM

## 2023-12-06 RX ADMIN — AMLODIPINE BESYLATE 10 MG: 10 TABLET ORAL at 08:35

## 2023-12-06 RX ADMIN — PANCRELIPASE 24000 UNITS: 24000; 76000; 120000 CAPSULE, DELAYED RELEASE PELLETS ORAL at 13:02

## 2023-12-06 RX ADMIN — CALCITRIOL 0.25 MCG: 0.25 CAPSULE, LIQUID FILLED ORAL at 08:42

## 2023-12-06 RX ADMIN — PANCRELIPASE 24000 UNITS: 24000; 76000; 120000 CAPSULE, DELAYED RELEASE PELLETS ORAL at 18:05

## 2023-12-06 RX ADMIN — NICOTINE 1 PATCH: 21 PATCH, EXTENDED RELEASE TRANSDERMAL at 08:38

## 2023-12-06 RX ADMIN — PANTOPRAZOLE SODIUM 40 MG: 40 INJECTION, POWDER, FOR SOLUTION INTRAVENOUS at 21:21

## 2023-12-06 RX ADMIN — ATORVASTATIN CALCIUM 80 MG: 40 TABLET, FILM COATED ORAL at 21:21

## 2023-12-06 RX ADMIN — INSULIN LISPRO 2 UNITS: 100 INJECTION, SOLUTION INTRAVENOUS; SUBCUTANEOUS at 13:02

## 2023-12-06 RX ADMIN — OXYCODONE HYDROCHLORIDE AND ACETAMINOPHEN 250 MG: 500 TABLET ORAL at 08:35

## 2023-12-06 RX ADMIN — PANCRELIPASE 24000 UNITS: 24000; 76000; 120000 CAPSULE, DELAYED RELEASE PELLETS ORAL at 08:00

## 2023-12-06 RX ADMIN — MULTIPLE VITAMINS W/ MINERALS TAB 1 TABLET: TAB ORAL at 08:38

## 2023-12-06 RX ADMIN — SODIUM CHLORIDE, SODIUM LACTATE, POTASSIUM CHLORIDE, AND CALCIUM CHLORIDE 125 ML/HR: .6; .31; .03; .02 INJECTION, SOLUTION INTRAVENOUS at 03:04

## 2023-12-06 RX ADMIN — ALLOPURINOL 300 MG: 300 TABLET ORAL at 08:35

## 2023-12-06 RX ADMIN — METOPROLOL SUCCINATE 12.5 MG: 25 TABLET, EXTENDED RELEASE ORAL at 08:35

## 2023-12-06 RX ADMIN — PANTOPRAZOLE SODIUM 8 MG/HR: 40 INJECTION, POWDER, FOR SOLUTION INTRAVENOUS at 03:04

## 2023-12-06 NOTE — PROGRESS NOTES
[address] [facility]  Progress Note  Name: Jorge Areavlo I  MRN: [id number]  Unit/Bed#: S -01 I Date of Admission: 12/4/2023   Date of Service: 12/6/2023 I Hospital Day: 2    Assessment/Plan   * GI bleed  Assessment & Plan  Patient presenting with melena and bright red bleeding per rectum intermittently for the last 2-1/2 days. EGD 12/5/2023 shows ulcer which was clipped  On aspirin and Plavix for cardiac history/TAVR. Hold for now. IV Protonix BID  GI consulted; Plan for colonoscopy on Friday, December 8, 2023. Acute blood loss anemia  Assessment & Plan  Hemoglobin   Date Value Ref Range Status   12/06/2023 7.0 (L) 12.0 - 17.0 g/dL Final     Comment:     Results verified by repeat  This is an appended report. These results have been appended to a previously preliminary verified report. 12/05/2023 8.1 (L) 12.0 - 17.0 g/dL Final   12/05/2023 7.0 (L) 12.0 - 17.0 g/dL Final     Hematocrit   Date Value Ref Range Status   12/06/2023 21.5 (L) 36.5 - 49.3 % Final     Comment: This is an appended report. These results have been appended to a previously preliminary verified report. 12/05/2023 24.9 (L) 36.5 - 49.3 % Final   12/05/2023 22.3 (L) 36.5 - 49.3 % Final     Hemoglobin 7.4 on admission. 3 months ago was 11.8. Secondary to GI bleed. Got 1 u pRBC so far  Transfuse for hemoglobin less than 7. Acute renal failure superimposed on stage 4 chronic kidney disease [facility])  Assessment & Plan  Lab Results   Component Value Date    EGFR 28 12/06/2023    EGFR 21 12/05/2023    EGFR 19 12/04/2023    CREATININE 2.15 (H) 12/06/2023    CREATININE 2.64 (H) 12/05/2023    CREATININE 2.93 (H) 12/04/2023     Creatinine baseline around 2.2  Suspect HSOAIB secondary to GI bleed.   We will hold lisinopril and diuretic for now - his BP still soft  S/p IVF    S/P TAVR (transcatheter aortic valve replacement)  Assessment & Plan  Patient on aspirin and Plavix which will be held for now because of GI bleed. Chronic diastolic CHF (congestive heart failure) ([facility])  Assessment & Plan  Wt Readings from Last 3 Encounters:   12/06/23 75 kg (165 lb 5.5 oz)   11/17/23 78.5 kg (173 lb)   11/16/23 78 kg (172 lb)       Appears euvolemic. Hold torsemide for now        Controlled type 2 diabetes mellitus with stage 4 chronic kidney disease, without long-term current use of insulin [facility])  Assessment & Plan  Lab Results   Component Value Date    HGBA1C 6.7 (H) 08/31/2023       Recent Labs     12/05/23  0748 12/05/23  1109 12/05/23  1706 12/05/23  2044   POCGLU 90 103 122 218*         Blood Sugar Average: Last 72 hrs:  (P) 122    SSI  Diabetic diet      CAD (coronary artery disease)  Assessment & Plan  Hold aspirin & Plavix for now and monitor BM & BP & H+H today    Primary hypertension  Assessment & Plan  Blood pressure stable. Continue amlodipine, Toprol-XL with hold parameters  Hold lisinopril and torsemide because of SHOAIB           VTE Pharmacologic Prophylaxis: VTE Score: 3 Moderate Risk (Score 3-4) - Pharmacological DVT Prophylaxis Contraindicated. Sequential Compression Devices Ordered. gib ,anemia    Mobility:   Basic Mobility Inpatient Raw Score: 23  JH-HLM Goal: 7: Walk 25 feet or more  JH-HLM Achieved: 7: Walk 25 feet or more  HLM Goal achieved. Continue to encourage appropriate mobility. Patient Centered Rounds: I performed bedside rounds with nursing staff today. Discussions with Specialists or Other Care Team Provider: none    Education and Discussions with Family / Patient:  patient alone. Total Time Spent on Date of Encounter in care of patient: 55 mins. This time was spent on one or more of the following: performing physical exam; counseling and coordination of care; obtaining or reviewing history; documenting in the medical record; reviewing/ordering tests, medications or procedures; communicating with other healthcare professionals and discussing with patient's family/caregivers.     Current Length of Stay: 2 day(s)  Current Patient Status: Inpatient   Certification Statement: The patient will continue to require additional inpatient hospital stay due to trend hgb, trend creat, colonoscopy as IP on 2023 with GI . Discharge Plan: Anticipate discharge in 48-72 hrs to home. Code Status: Level 1 - Full Code    Subjective:   No acute complaints    No abd pain    No BM yet - I encouraged him to ambulate today    No BRBPR on the underwears this morning    Objective:     Vitals:   Temp (24hrs), Av.8 °F (36.6 °C), Min:97.2 °F (36.2 °C), Max:98.7 °F (37.1 °C)    Temp:  [97.2 °F (36.2 °C)-98.7 °F (37.1 °C)] 97.6 °F (36.4 °C)  HR:  [57-66] 58  Resp:  [18-20] 18  BP: (121-171)/(41-67) 130/42  SpO2:  [95 %-100 %] 98 %  Body mass index is 23.72 kg/m². Input and Output Summary (last 24 hours): Intake/Output Summary (Last 24 hours) at 2023 1400  Last data filed at 2023 0805  Gross per 24 hour   Intake 240 ml   Output 1150 ml   Net -910 ml       Physical Exam:   Physical Exam  Vitals and nursing note reviewed. Constitutional:       Comments: Chronically ill appearing    NAD   HENT:      Head: Normocephalic and atraumatic. Nose: Nose normal.      Mouth/Throat:      Mouth: Mucous membranes are moist.   Eyes:      General: No scleral icterus. Cardiovascular:      Rate and Rhythm: Normal rate and regular rhythm. Heart sounds: Murmur heard. Pulmonary:      Effort: Pulmonary effort is normal.   Abdominal:      General: Bowel sounds are normal.      Tenderness: There is no abdominal tenderness. Musculoskeletal:         General: No swelling. Cervical back: Normal range of motion. Skin:     Findings: Bruising present. Neurological:      General: No focal deficit present. Mental Status: He is alert.    Psychiatric:         Behavior: Behavior normal.          Additional Data:     Labs:  Results from last 7 days   Lab Units 23  0548 23  1750 23  1049 WBC Thousand/uL 3.55*   < > 7.15   HEMOGLOBIN g/dL 7.0*   < > 7.4*   HEMATOCRIT % 21.5*   < > 23.8*   PLATELETS Thousands/uL 121*   < > 139*   NEUTROS PCT %  --   --  85*   LYMPHS PCT %  --   --  10*   MONOS PCT %  --   --  4   EOS PCT %  --   --  1    < > = values in this interval not displayed. Results from last 7 days   Lab Units 12/06/23  0548   SODIUM mmol/L 141   POTASSIUM mmol/L 4.0   CHLORIDE mmol/L 112*   CO2 mmol/L 26   BUN mg/dL 47*   CREATININE mg/dL 2.15*   ANION GAP mmol/L 3   CALCIUM mg/dL 8.6   ALBUMIN g/dL 3.1*   TOTAL BILIRUBIN mg/dL 0.42   ALK PHOS U/L 51   ALT U/L 5*   AST U/L 11*   GLUCOSE RANDOM mg/dL 106     Results from last 7 days   Lab Units 12/04/23  1049   INR  1.03     Results from last 7 days   Lab Units 12/06/23  1121 12/06/23  0809 12/05/23  2044 12/05/23  1706 12/05/23  1109 12/05/23  0748 12/04/23  1637 12/04/23  1347   POC GLUCOSE mg/dl 238* 105 218* 122 103 90 101 98               Lines/Drains:  Invasive Devices       Peripheral Intravenous Line  Duration             Peripheral IV 12/04/23 Left Forearm 2 days                          Imaging: No pertinent imaging reviewed.     Recent Cultures (last 7 days): none        Last 24 Hours Medication List:   Current Facility-Administered Medications   Medication Dose Route Frequency Provider Last Rate    acetaminophen  650 mg Oral Q6H PRN Antonia Abdi PA-C      allopurinol  300 mg Oral Daily Anthony Soto MD      amLODIPine  10 mg Oral Daily Anthony Soto MD      ascorbic acid  250 mg Oral Daily Antonia Abdi PA-C      atorvastatin  80 mg Oral HS Anthony Soto MD      calcitriol  0.25 mcg Oral Daily Anthony Soto MD      insulin lispro  1-5 Units Subcutaneous TID AC Anthony Soto MD      melatonin  6 mg Oral HS PRN Antonia Abdi PA-C      metoprolol succinate  12.5 mg Oral Daily Antonia Abdi PA-C      [START ON 12/7/2023] multi-electrolyte  75 mL/hr Intravenous Continuous Huy Canchola PA-C      multivitamin-minerals  1 tablet Oral Daily Antonia Abdi PA-C      nicotine  1 patch Transdermal Daily Dennis Gibbons MD      pancrelipase (Lip-Prot-Amyl)  24,000 Units Oral TID With Meals Antonia Abdi PA-C      pantoprazole  40 mg Intravenous Q12H [address] [address], [place]          Today, Patient Was Seen By: Eugenie Osler, PA-C    **Please Note: This note may have been constructed using a voice recognition system. **

## 2023-12-06 NOTE — ASSESSMENT & PLAN NOTE
Lab Results   Component Value Date    EGFR 28 12/06/2023    EGFR 21 12/05/2023    EGFR 19 12/04/2023    CREATININE 2.15 (H) 12/06/2023    CREATININE 2.64 (H) 12/05/2023    CREATININE 2.93 (H) 12/04/2023     Creatinine baseline around 2.2  Suspect SHOAIB secondary to GI bleed.   We will hold lisinopril and diuretic for now - his BP still soft  S/p IVF

## 2023-12-06 NOTE — ASSESSMENT & PLAN NOTE
Lab Results   Component Value Date    HGBA1C 6.7 (H) 08/31/2023       Recent Labs     12/05/23  0748 12/05/23  1109 12/05/23  1706 12/05/23 2044   POCGLU 90 103 122 218*         Blood Sugar Average: Last 72 hrs:  (P) 122    SSI  Diabetic diet

## 2023-12-06 NOTE — PLAN OF CARE
Problem: Prexisting or High Potential for Compromised Skin Integrity  Goal: Skin integrity is maintained or improved  Description: INTERVENTIONS:  - Identify patients at risk for skin breakdown  - Assess and monitor skin integrity  - Assess and monitor nutrition and hydration status  - Monitor labs   - Assess for incontinence   - Turn and reposition patient  - Assist with mobility/ambulation  - Relieve pressure over bony prominences  - Avoid friction and shearing  - Provide appropriate hygiene as needed including keeping skin clean and dry  - Evaluate need for skin moisturizer/barrier cream  - Collaborate with interdisciplinary team   - Patient/family teaching  - Consider wound care consult   Outcome: Progressing     Problem: PAIN - ADULT  Goal: Verbalizes/displays adequate comfort level or baseline comfort level  Description: Interventions:  - Encourage patient to monitor pain and request assistance  - Assess pain using appropriate pain scale  - Administer analgesics based on type and severity of pain and evaluate response  - Implement non-pharmacological measures as appropriate and evaluate response  - Consider cultural and social influences on pain and pain management  - Notify physician/advanced practitioner if interventions unsuccessful or patient reports new pain  Outcome: Progressing     Problem: INFECTION - ADULT  Goal: Absence or prevention of progression during hospitalization  Description: INTERVENTIONS:  - Assess and monitor for signs and symptoms of infection  - Monitor lab/diagnostic results  - Monitor all insertion sites, i.e. indwelling lines, tubes, and drains  - Monitor endotracheal if appropriate and nasal secretions for changes in amount and color  - [place] appropriate cooling/warming therapies per order  - Administer medications as ordered  - Instruct and encourage patient and family to use good hand hygiene technique  - Identify and instruct in appropriate isolation precautions for identified infection/condition  Outcome: Progressing  Goal: Absence of fever/infection during neutropenic period  Description: INTERVENTIONS:  - Monitor WBC    Outcome: Progressing     Problem: SAFETY ADULT  Goal: Patient will remain free of falls  Description: INTERVENTIONS:  - Educate patient/family on patient safety including physical limitations  - Instruct patient to call for assistance with activity   - Consult OT/PT to assist with strengthening/mobility   - Keep Call bell within reach  - Keep bed low and locked with side rails adjusted as appropriate  - Keep care items and personal belongings within reach  - Initiate and maintain comfort rounds  - Make Fall Risk Sign visible to staff  - Offer Toileting every  Hours, in advance of need  - Initiate/Maintain alarm  - Obtain necessary fall risk management equipment:   - Apply yellow socks and bracelet for high fall risk patients  - Consider moving patient to room near nurses station  Outcome: Progressing  Goal: Maintain or return to baseline ADL function  Description: INTERVENTIONS:  -  Assess patient's ability to carry out ADLs; assess patient's baseline for ADL function and identify physical deficits which impact ability to perform ADLs (bathing, care of mouth/teeth, toileting, grooming, dressing, etc.)  - Assess/evaluate cause of self-care deficits   - Assess range of motion  - Assess patient's mobility; develop plan if impaired  - Assess patient's need for assistive devices and provide as appropriate  - Encourage maximum independence but intervene and supervise when necessary  - Involve family in performance of ADLs  - Assess for home care needs following discharge   - Consider OT consult to assist with ADL evaluation and planning for discharge  - Provide patient education as appropriate  Outcome: Progressing  Goal: Maintains/Returns to pre admission functional level  Description: INTERVENTIONS:  - Perform AM-PAC 6 Click Basic Mobility/ Daily Activity assessment daily.  - Set and communicate daily mobility goal to care team and patient/family/caregiver. - Collaborate with rehabilitation services on mobility goals if consulted  - Perform Range of Motion  times a day. - Reposition patient every  hours. - Dangle patient  times a day  - Stand patient  times a day  - Ambulate patient  times a day  - Out of bed to chair  times a day   - Out of bed for meals times a day  - Out of bed for toileting  - Record patient progress and toleration of activity level   Outcome: Progressing     Problem: DISCHARGE PLANNING  Goal: Discharge to home or other facility with appropriate resources  Description: INTERVENTIONS:  - Identify barriers to discharge w/patient and caregiver  - Arrange for needed discharge resources and transportation as appropriate  - Identify discharge learning needs (meds, wound care, etc.)  - Arrange for interpretive services to assist at discharge as needed  - Refer to Case Management Department for coordinating discharge planning if the patient needs post-hospital services based on physician/advanced practitioner order or complex needs related to functional status, cognitive ability, or social support system  Outcome: Progressing     Problem: Knowledge Deficit  Goal: Patient/family/caregiver demonstrates understanding of disease process, treatment plan, medications, and discharge instructions  Description: Complete learning assessment and assess knowledge base.   Interventions:  - Provide teaching at level of understanding  - Provide teaching via preferred learning methods  Outcome: Progressing

## 2023-12-06 NOTE — ASSESSMENT & PLAN NOTE
Patient presenting with melena and bright red bleeding per rectum intermittently for the last 2-1/2 days. EGD 12/5/2023 shows ulcer which was clipped  On aspirin and Plavix for cardiac history/TAVR. Hold for now. IV Protonix BID  GI consulted; Plan for colonoscopy on Friday, December 8, 2023.

## 2023-12-06 NOTE — PROGRESS NOTES
Progress Note - Claire Stage. 78 y.o. male MRN: [id number]    Unit/Bed#: S -01 Encounter: [id number]      Reason for Consult / Principal Problem: GI Bleed     ASSESSMENT and PLAN:    Active Problems: There are no active Hospital Problems. Anemia  Melena  Hematochezia  GERD  Barretts Esophagus  Exocrine Pancreatic Insufficiency. - Follow hemoglobin transfuse as needed  - EGD on Tuesday, December 5, 2023 with notation of a linear ulcer in the fundus of the stomach with a flat pigmented spot (Mikey class IIc). Tissue was ablated with 2 applications of heater probe and 4 clips successfully placed. Some erythema was noted with erosion in the prepyloric region. First and second part of the duodenum normal.  -Unfortunately hemoglobin has dropped to 8.1-7.0.  - noted some mild hematochezia this AM  -Would continue to hold Plavix.  -Continue IV pantoprazole 40 mg twice daily.  -Continue nonulcerogenic diet for today  - Would switch to clear liquid diet on Thursday, December 7 and plan colonoscopy on Friday, December 8, 2023.    ----------------------------------------------------------------------------------------------------------------    Subjective:   70-year-old white male new to me with past medical history of chronic kidney disease stage III-IV with an acute kidney injury, coronary artery disease, severe aortic stenosis status post TAVR on Plavix with last dose on [place], December 3, 2023, carotid stenosis, chronic kidney disease, history of colon polyps, history of colonic ischemia, diabetes type 2, GERD, Batista's, exocrine pancreatic insufficiency, history of kidney stones, hyperlipidemia, hypertension, peripheral artery disease and right hemicolectomy secondary to perforated appendicitis who presents to the emergency room with symptomatic anemia. He had reported some dizziness as well as some dark stools as well as red blood in his stool that had started yesterday.   He is on Plavix for history of TAVR. His hemoglobin was found to be 7.4 in the emergency room and was 11.8 several months ago. Digital rectal exam in the emergency room done by the attending who I spoke with did reveal red blood. Interval History: Patient underwent EGD on Tuesday, December 5, 2023 with notation of a linear ulcer in the fundus of the stomach with a flat pigmented spot (Mikey class IIc). Tissue was ablated with 2 applications of heater probe and 4 clips successfully placed. Some erythema was noted with erosion in the prepyloric region. First and second part of the duodenum normal.  Hemoglobin has dropped 8.1-7.0. He is tolerating a nonulcerogenic diet. He did report some red blood with stool this morning. Objective:     Vitals: Blood pressure (!) 130/42, pulse 58, temperature 97.6 °F (36.4 °C), temperature source Oral, resp. rate 18, height 5' 10" (1.778 m), weight 75 kg (165 lb 5.5 oz), SpO2 98 %. ,Body mass index is 23.72 kg/m².       Intake/Output Summary (Last 24 hours) at 12/6/2023 0825  Last data filed at 12/6/2023 0805  Gross per 24 hour   Intake 240 ml   Output 1150 ml   Net -910 ml       Physical Exam:     General Appearance: Alert, appears stated age and cooperative  HEENT: NCAT, sclera anicteric  Lungs: Clear to auscultation bilaterally, no rales or rhonchi, no labored breathing/accessory muscle use  Heart: Regular rate and rhythm, S1, S2 normal, no murmur, click, rub or gallop  Abdomen: Soft, non-tender, non-distended; bowel sounds normal; no masses or no organomegaly  Extremities: No cyanosis, clubbing, or edema  Neuro: Alert and oriented x 3  Psych: Normal behavior    Invasive Devices       Peripheral Intravenous Line  Duration             Peripheral IV 12/04/23 Left Forearm 1 day                    Lab Results:  Results from last 7 days   Lab Units 12/06/23  0548 12/04/23  1750 12/04/23  1049   WBC Thousand/uL 3.55*   < > 7.15   HEMOGLOBIN g/dL 7.0*   < > 7.4*   HEMATOCRIT % 21.5*   < > 23.8*   PLATELETS Thousands/uL 121*   < > 139*   NEUTROS PCT %  --   --  85*   LYMPHS PCT %  --   --  10*   MONOS PCT %  --   --  4   EOS PCT %  --   --  1    < > = values in this interval not displayed. Results from last 7 days   Lab Units 12/06/23  0548   POTASSIUM mmol/L 4.0   CHLORIDE mmol/L 112*   CO2 mmol/L 26   BUN mg/dL 47*   CREATININE mg/dL 2.15*   CALCIUM mg/dL 8.6   ALK PHOS U/L 51   ALT U/L 5*   AST U/L 11*     Invalid input(s): "BILI"  Results from last 7 days   Lab Units 12/04/23  1049   INR  1.03     Results from last 7 days   Lab Units 12/04/23  1049   LIPASE u/L <6*       Imaging Studies: I have personally reviewed pertinent imaging studies. EGD    Result Date: 12/5/2023  Impression: Single ulcer in the fundus of the stomach with flat pigmented spot (Mikey IIC); tissue ablated with heater probe; placed 4 clips successfully Mild erythematous mucosa with erosion in the prepyloric region The duodenal bulb, 1st part of the duodenum and 2nd part of the duodenum appeared normal. RECOMMENDATION:  There is no recommended follow-up for this procedure. Monitor H&H and transfuse as needed. Can start on full liquid diet today. If hemoglobin remains stable, can advance to nonulcerogenic tomorrow. Hold Plavix for now. If patient continues to have drop in hemoglobin/ongoing rectal bleeding, would recommend colonoscopy to rule out any colonic source that may account for degree of anemia/rectal bleeding. Continue pantoprazole 40 mg IV twice daily. Avoid all NSAIDs. MD Donn Jiang PA-C

## 2023-12-06 NOTE — ASSESSMENT & PLAN NOTE
Wt Readings from Last 3 Encounters:   12/06/23 75 kg (165 lb 5.5 oz)   11/17/23 78.5 kg (173 lb)   11/16/23 78 kg (172 lb)       Appears euvolemic.   Hold torsemide for now

## 2023-12-06 NOTE — ASSESSMENT & PLAN NOTE
Hemoglobin   Date Value Ref Range Status   12/06/2023 7.0 (L) 12.0 - 17.0 g/dL Final     Comment:     Results verified by repeat  This is an appended report. These results have been appended to a previously preliminary verified report. 12/05/2023 8.1 (L) 12.0 - 17.0 g/dL Final   12/05/2023 7.0 (L) 12.0 - 17.0 g/dL Final     Hematocrit   Date Value Ref Range Status   12/06/2023 21.5 (L) 36.5 - 49.3 % Final     Comment: This is an appended report. These results have been appended to a previously preliminary verified report. 12/05/2023 24.9 (L) 36.5 - 49.3 % Final   12/05/2023 22.3 (L) 36.5 - 49.3 % Final     Hemoglobin 7.4 on admission. 3 months ago was 11.8. Secondary to GI bleed. Got 1 u pRBC so far  Transfuse for hemoglobin less than 7.

## 2023-12-07 LAB
ANION GAP SERPL CALCULATED.3IONS-SCNC: 5 MMOL/L
BUN SERPL-MCNC: 44 MG/DL (ref 5–25)
CALCIUM SERPL-MCNC: 8.8 MG/DL (ref 8.4–10.2)
CHLORIDE SERPL-SCNC: 111 MMOL/L (ref 96–108)
CO2 SERPL-SCNC: 25 MMOL/L (ref 21–32)
CREAT SERPL-MCNC: 2.01 MG/DL (ref 0.6–1.3)
ERYTHROCYTE [DISTWIDTH] IN BLOOD BY AUTOMATED COUNT: 15.2 % (ref 11.6–15.1)
GFR SERPL CREATININE-BSD FRML MDRD: 30 ML/MIN/1.73SQ M
GLUCOSE SERPL-MCNC: 117 MG/DL (ref 65–140)
GLUCOSE SERPL-MCNC: 120 MG/DL (ref 65–140)
GLUCOSE SERPL-MCNC: 122 MG/DL (ref 65–140)
GLUCOSE SERPL-MCNC: 155 MG/DL (ref 65–140)
HCT VFR BLD AUTO: 21.1 % (ref 36.5–49.3)
HCT VFR BLD AUTO: 23.8 % (ref 36.5–49.3)
HGB BLD-MCNC: 6.7 G/DL (ref 12–17)
HGB BLD-MCNC: 7.8 G/DL (ref 12–17)
MCH RBC QN AUTO: 30.6 PG (ref 26.8–34.3)
MCHC RBC AUTO-ENTMCNC: 31.3 G/DL (ref 31.4–37.4)
MCV RBC AUTO: 98 FL (ref 82–98)
PLATELET # BLD AUTO: 123 THOUSANDS/UL (ref 149–390)
PMV BLD AUTO: 10.3 FL (ref 8.9–12.7)
POTASSIUM SERPL-SCNC: 4.1 MMOL/L (ref 3.5–5.3)
RBC # BLD AUTO: 2.16 MILLION/UL (ref 3.88–5.62)
SODIUM SERPL-SCNC: 141 MMOL/L (ref 135–147)
WBC # BLD AUTO: 4.31 THOUSAND/UL (ref 4.31–10.16)

## 2023-12-07 PROCEDURE — 85027 COMPLETE CBC AUTOMATED: CPT | Performed by: PHYSICIAN ASSISTANT

## 2023-12-07 PROCEDURE — 85018 HEMOGLOBIN: CPT | Performed by: INTERNAL MEDICINE

## 2023-12-07 PROCEDURE — 80048 BASIC METABOLIC PNL TOTAL CA: CPT | Performed by: PHYSICIAN ASSISTANT

## 2023-12-07 PROCEDURE — C9113 INJ PANTOPRAZOLE SODIUM, VIA: HCPCS | Performed by: PHYSICIAN ASSISTANT

## 2023-12-07 PROCEDURE — P9016 RBC LEUKOCYTES REDUCED: HCPCS

## 2023-12-07 PROCEDURE — 82948 REAGENT STRIP/BLOOD GLUCOSE: CPT

## 2023-12-07 PROCEDURE — 85014 HEMATOCRIT: CPT | Performed by: INTERNAL MEDICINE

## 2023-12-07 PROCEDURE — 30233N1 TRANSFUSION OF NONAUTOLOGOUS RED BLOOD CELLS INTO PERIPHERAL VEIN, PERCUTANEOUS APPROACH: ICD-10-PCS | Performed by: INTERNAL MEDICINE

## 2023-12-07 PROCEDURE — 99232 SBSQ HOSP IP/OBS MODERATE 35: CPT | Performed by: INTERNAL MEDICINE

## 2023-12-07 RX ORDER — AMLODIPINE BESYLATE 10 MG/1
10 TABLET ORAL DAILY
Status: DISCONTINUED | OUTPATIENT
Start: 2023-12-07 | End: 2023-12-10 | Stop reason: HOSPADM

## 2023-12-07 RX ORDER — HYDRALAZINE HYDROCHLORIDE 20 MG/ML
5 INJECTION INTRAMUSCULAR; INTRAVENOUS EVERY 6 HOURS PRN
Status: DISCONTINUED | OUTPATIENT
Start: 2023-12-07 | End: 2023-12-10 | Stop reason: HOSPADM

## 2023-12-07 RX ADMIN — PANTOPRAZOLE SODIUM 40 MG: 40 INJECTION, POWDER, FOR SOLUTION INTRAVENOUS at 09:40

## 2023-12-07 RX ADMIN — ALLOPURINOL 300 MG: 300 TABLET ORAL at 09:39

## 2023-12-07 RX ADMIN — AMLODIPINE BESYLATE 10 MG: 10 TABLET ORAL at 16:23

## 2023-12-07 RX ADMIN — INSULIN LISPRO 1 UNITS: 100 INJECTION, SOLUTION INTRAVENOUS; SUBCUTANEOUS at 09:32

## 2023-12-07 RX ADMIN — NICOTINE 1 PATCH: 21 PATCH, EXTENDED RELEASE TRANSDERMAL at 09:37

## 2023-12-07 RX ADMIN — PANTOPRAZOLE SODIUM 40 MG: 40 INJECTION, POWDER, FOR SOLUTION INTRAVENOUS at 21:12

## 2023-12-07 RX ADMIN — POLYETHYLENE GLYCOL 3350, SODIUM SULFATE ANHYDROUS, SODIUM BICARBONATE, SODIUM CHLORIDE, POTASSIUM CHLORIDE 4000 ML: 236; 22.74; 6.74; 5.86; 2.97 POWDER, FOR SOLUTION ORAL at 16:18

## 2023-12-07 RX ADMIN — ATORVASTATIN CALCIUM 80 MG: 40 TABLET, FILM COATED ORAL at 21:12

## 2023-12-07 RX ADMIN — OXYCODONE HYDROCHLORIDE AND ACETAMINOPHEN 250 MG: 500 TABLET ORAL at 09:39

## 2023-12-07 RX ADMIN — METOPROLOL SUCCINATE 12.5 MG: 25 TABLET, EXTENDED RELEASE ORAL at 09:37

## 2023-12-07 RX ADMIN — MULTIPLE VITAMINS W/ MINERALS TAB 1 TABLET: TAB ORAL at 09:50

## 2023-12-07 RX ADMIN — CALCITRIOL 0.25 MCG: 0.25 CAPSULE, LIQUID FILLED ORAL at 09:33

## 2023-12-07 NOTE — PROGRESS NOTES
Progress Note - Alessandra Fisher. 78 y.o. male MRN: [id number]    Unit/Bed#: S -01 Encounter: [id number]      Reason for Consult / Principal Problem: GI Bleed     ASSESSMENT and PLAN:    Active Problems: There are no active Hospital Problems. Anemia  Melena  Hematochezia  GERD  Barretts Esophagus  Exocrine Pancreatic Insufficiency. - EGD on Tuesday, December 5, 2023 with notation of a linear ulcer in the fundus of the stomach with a flat pigmented spot (Mikey class IIc). Tissue was ablated with 2 applications of heater probe and 4 clips successfully placed. Some erythema was noted with erosion in the prepyloric region. First and second part of the duodenum normal.  - Follow hemoglobin transfuse as needed  -Unfortunately hemoglobin has dropped to 8.1-7.0 and now 6.7 today. He has been ordered one unit of PRBC  - noted some mild hematochezia Weds 12/6/23 AM, stool brown today  -Would continue to hold Plavix.  -Continue IV pantoprazole 40 mg twice daily.  -Continue nonulcerogenic diet for today  - On clear liquid diet today, plan colonoscopy on Friday, December 8, 2023.    ----------------------------------------------------------------------------------------------------------------    Subjective:   27-year-old white male new to me with past medical history of chronic kidney disease stage III-IV with an acute kidney injury, coronary artery disease, severe aortic stenosis status post TAVR on Plavix with last dose on Roland, December 3, 2023, carotid stenosis, chronic kidney disease, history of colon polyps, history of colonic ischemia, diabetes type 2, GERD, Batista's, exocrine pancreatic insufficiency, history of kidney stones, hyperlipidemia, hypertension, peripheral artery disease and right hemicolectomy secondary to perforated appendicitis who presents to the emergency room with symptomatic anemia.   He had reported some dizziness as well as some dark stools as well as red blood in his stool that had started yesterday. He is on Plavix for history of TAVR. His hemoglobin was found to be 7.4 in the emergency room and was 11.8 several months ago. Digital rectal exam in the emergency room done by the attending who I spoke with did reveal red blood. Interval History: Patient underwent EGD on Tuesday, December 5, 2023 with notation of a linear ulcer in the fundus of the stomach with a flat pigmented spot (Mikey class IIc). Tissue was ablated with 2 applications of heater probe and 4 clips successfully placed. Some erythema was noted with erosion in the prepyloric region. First and second part of the duodenum normal.  Hemoglobin has dropped 8.1-7.0 to 6.7 today. He is tolerating a nonulcerogenic diet. He did report some red blood with stool on Weds morning 12/6/23. Notes stool was brown and no blood this AM.    Denies abdominal pain or N/V. Objective:     Vitals: Blood pressure (!) 177/50, pulse 57, temperature 97.8 °F (36.6 °C), temperature source Oral, resp. rate 18, height 5' 10" (1.778 m), weight 76.9 kg (169 lb 8.5 oz), SpO2 96 %. ,Body mass index is 24.33 kg/m².       Intake/Output Summary (Last 24 hours) at 12/7/2023 0903  Last data filed at 12/7/2023 5188  Gross per 24 hour   Intake 590 ml   Output --   Net 590 ml       Physical Exam:     General Appearance: Alert, appears stated age and cooperative  HEENT: NCAT, sclera anicteric  Lungs: Clear to auscultation bilaterally, no rales or rhonchi, no labored breathing/accessory muscle use  Heart: Regular rate and rhythm, S1, S2 normal, no murmur, click, rub or gallop  Abdomen: Soft, non-tender, non-distended; bowel sounds normal; no masses or no organomegaly  Extremities: No cyanosis, clubbing, or edema  Neuro: Alert and oriented x 3  Psych: Normal behavior    Invasive Devices       Peripheral Intravenous Line  Duration             Peripheral IV 12/04/23 Left Forearm 2 days                    Lab Results:  Results from last 7 days   Lab Units 12/07/23  0444 12/04/23  1750 12/04/23  1049   WBC Thousand/uL 4.31   < > 7.15   HEMOGLOBIN g/dL 6.7*   < > 7.4*   HEMATOCRIT % 21.1*   < > 23.8*   PLATELETS Thousands/uL 123*   < > 139*   NEUTROS PCT %  --   --  85*   LYMPHS PCT %  --   --  10*   MONOS PCT %  --   --  4   EOS PCT %  --   --  1    < > = values in this interval not displayed. Results from last 7 days   Lab Units 12/07/23  0444 12/06/23  0548   POTASSIUM mmol/L 4.1 4.0   CHLORIDE mmol/L 111* 112*   CO2 mmol/L 25 26   BUN mg/dL 44* 47*   CREATININE mg/dL 2.01* 2.15*   CALCIUM mg/dL 8.8 8.6   ALK PHOS U/L  --  51   ALT U/L  --  5*   AST U/L  --  11*     Invalid input(s): "BILI"  Results from last 7 days   Lab Units 12/04/23  1049   INR  1.03     Results from last 7 days   Lab Units 12/04/23  1049   LIPASE u/L <6*       Imaging Studies: I have personally reviewed pertinent imaging studies. EGD    Result Date: 12/5/2023  Impression: Single ulcer in the fundus of the stomach with flat pigmented spot (Mikey IIC); tissue ablated with heater probe; placed 4 clips successfully Mild erythematous mucosa with erosion in the prepyloric region The duodenal bulb, 1st part of the duodenum and 2nd part of the duodenum appeared normal. RECOMMENDATION:  There is no recommended follow-up for this procedure. Monitor H&H and transfuse as needed. Can start on full liquid diet today. If hemoglobin remains stable, can advance to nonulcerogenic tomorrow. Hold Plavix for now. If patient continues to have drop in hemoglobin/ongoing rectal bleeding, would recommend colonoscopy to rule out any colonic source that may account for degree of anemia/rectal bleeding. Continue pantoprazole 40 mg IV twice daily. Avoid all NSAIDs. MD Johnny Woodard PA-C  [facility]

## 2023-12-07 NOTE — ASSESSMENT & PLAN NOTE
Patient with evidence of chronic anemia who presented with rectal bleeding and drop in hgb to 6.7, prompting PRBC transfusion.  Currently improved to 8.8  Continue to trend hemoglobin every 12 hours  See plan above

## 2023-12-07 NOTE — ASSESSMENT & PLAN NOTE
Lab Results   Component Value Date    EGFR 30 12/07/2023    EGFR 28 12/06/2023    EGFR 21 12/05/2023    CREATININE 2.01 (H) 12/07/2023    CREATININE 2.15 (H) 12/06/2023    CREATININE 2.64 (H) 12/05/2023     Follows with Dr. Strong Stake creatinine of 2.2-2.3 presented with creatinine of 2.6 suspect secondary to GI bleed and volume depletion. Resolved with IV fluid. Currently lisinopril and diuretics on hold.   Creat 2.01

## 2023-12-07 NOTE — PROGRESS NOTES
[id number]  Progress Note  Name: Jodi Pallas. I  MRN: [id number]  Unit/Bed#: S -01 I Date of Admission: 12/4/2023   Date of Service: 12/7/2023 I Hospital Day: 3    Assessment/Plan   * Acute blood loss anemia  Assessment & Plan  Presented with hemoglobin of 7.4 required 1 unit on admission with repeat hemoglobin today noted to be 6.7. Hemoglobin at baseline appears to be around 12 noted 3 months ago prior to presentation. S/p EGD by GI showing Mikey 2C ulcer with pigmented spot s/p clipping. Reports having 1 episode of reddish stool today. GI following  Pending colonoscopy on 1223 after Plavix washout  Hold DAPT  IV PPI twice daily  Transfuse for hemoglobin less than 7  Monitor hemoglobin will recheck H&H at 3 PM today  Bowel prep and clear liquid diet today followed by n.p.o. midnight  Will discontinue IV fluid given patient tolerating oral diet well    Acute renal failure superimposed on stage 4 chronic kidney disease [facility])  Assessment & Plan  Lab Results   Component Value Date    EGFR 30 12/07/2023    EGFR 28 12/06/2023    EGFR 21 12/05/2023    CREATININE 2.01 (H) 12/07/2023    CREATININE 2.15 (H) 12/06/2023    CREATININE 2.64 (H) 12/05/2023     Baseline creatinine of 2.2 presented with creatinine of 2.6 suspect secondary to GI bleed and volume depletion. Resolved with IV fluid. Currently lisinopril and diuretics on hold. Will hold further IV fluid  Continue to hold lisinopril and diuretics as patient appears euvolemic    GI bleed  Assessment & Plan  Patient presenting with melena and bright red bleeding per rectum intermittently for the last 2-1/2 days. EGD 12/5/2023 shows ulcer which was clipped  On aspirin and Plavix for cardiac history/TAVR. Hold for now. IV Protonix BID  GI consulted; Plan for colonoscopy on Friday, December 8, 2023.        S/P TAVR (transcatheter aortic valve replacement)  Assessment & Plan  Patient on aspirin and Plavix which will be held for now because of GI bleed. Chronic diastolic CHF (congestive heart failure) (HCC)  Assessment & Plan  Wt Readings from Last 3 Encounters:   12/07/23 76.9 kg (169 lb 8.5 oz)   11/17/23 78.5 kg (173 lb)   11/16/23 78 kg (172 lb)       Appears euvolemic. Hold torsemide for now        Controlled type 2 diabetes mellitus with stage 4 chronic kidney disease, without long-term current use of insulin [facility])  Assessment & Plan  Lab Results   Component Value Date    HGBA1C 6.7 (H) 08/31/2023       Recent Labs     12/06/23  1635 12/06/23  2106 12/07/23  0840 12/07/23  1108   POCGLU 130 198* 155* 122         Blood Sugar Average: Last 72 hrs:  (P) 140    SSI  Diabetic diet      CAD (coronary artery disease)  Assessment & Plan  Hold aspirin & Plavix for now and monitor BM & BP & H+H today    Primary hypertension  Assessment & Plan  Blood pressure stable. Continue amlodipine, Toprol-XL with hold parameters  Hold lisinopril and torsemide because of SHOAIB           VTE Pharmacologic Prophylaxis: VTE Score: 3 Moderate Risk (Score 3-4) - Pharmacological DVT Prophylaxis Contraindicated. Sequential Compression Devices Ordered. gib ,anemia    Mobility:   Basic Mobility Inpatient Raw Score: 22  JH-HLM Goal: 7: Walk 25 feet or more  JH-HLM Achieved: 7: Walk 25 feet or more  HLM Goal achieved. Continue to encourage appropriate mobility. Patient Centered Rounds: I performed bedside rounds with nursing staff today. Discussions with Specialists or Other Care Team Provider: none    Education and Discussions with Family / Patient:  patient alone. Total Time Spent on Date of Encounter in care of patient: 55 mins.  This time was spent on one or more of the following: performing physical exam; counseling and coordination of care; obtaining or reviewing history; documenting in the medical record; reviewing/ordering tests, medications or procedures; communicating with other healthcare professionals and discussing with patient's family/caregivers. Current Length of Stay: 3 day(s)  Current Patient Status: Inpatient   Certification Statement: The patient will continue to require additional inpatient hospital stay due to trend hgb, trend creat, colonoscopy as IP on 2023 with GI . Discharge Plan: Anticipate discharge in 48-72 hrs to home. Code Status: Level 1 - Full Code    Subjective:   Patient states having 1 bowel movement which was brownish-red denies derian hematochezia. Denies any other complaints. Patient was seen and examined by me. Communicated clearly. No particular overnight event reported. Hemodynamically stable and afebrile. Patient feels better overall. Objective:     Vitals:   Temp (24hrs), Av.9 °F (36.6 °C), Min:97.7 °F (36.5 °C), Max:98 °F (36.7 °C)    Temp:  [97.7 °F (36.5 °C)-98 °F (36.7 °C)] 97.8 °F (36.6 °C)  HR:  [49-61] 57  Resp:  [16-18] 18  BP: (137-177)/(38-52) 177/50  SpO2:  [92 %-99 %] 96 %  Body mass index is 24.33 kg/m². Input and Output Summary (last 24 hours): Intake/Output Summary (Last 24 hours) at 2023 1459  Last data filed at 2023 5961  Gross per 24 hour   Intake 590 ml   Output 180 ml   Net 410 ml       Physical Exam:   Physical Exam  Vitals and nursing note reviewed. Constitutional:       Comments: Chronically ill appearing    NAD   HENT:      Head: Normocephalic and atraumatic. Nose: Nose normal.      Mouth/Throat:      Mouth: Mucous membranes are moist.   Eyes:      General: No scleral icterus. Cardiovascular:      Rate and Rhythm: Normal rate and regular rhythm. Heart sounds: Murmur heard. Pulmonary:      Effort: Pulmonary effort is normal.   Abdominal:      General: Bowel sounds are normal.      Tenderness: There is no abdominal tenderness. Musculoskeletal:         General: No swelling. Cervical back: Normal range of motion. Skin:     Findings: Bruising present. Neurological:      General: No focal deficit present.       Mental Status: He is alert. Psychiatric:         Behavior: Behavior normal.          Additional Data:     Labs:  Results from last 7 days   Lab Units 12/07/23  0444 12/04/23  1750 12/04/23  1049   WBC Thousand/uL 4.31   < > 7.15   HEMOGLOBIN g/dL 6.7*   < > 7.4*   HEMATOCRIT % 21.1*   < > 23.8*   PLATELETS Thousands/uL 123*   < > 139*   NEUTROS PCT %  --   --  85*   LYMPHS PCT %  --   --  10*   MONOS PCT %  --   --  4   EOS PCT %  --   --  1    < > = values in this interval not displayed. Results from last 7 days   Lab Units 12/07/23  0444 12/06/23  0548   SODIUM mmol/L 141 141   POTASSIUM mmol/L 4.1 4.0   CHLORIDE mmol/L 111* 112*   CO2 mmol/L 25 26   BUN mg/dL 44* 47*   CREATININE mg/dL 2.01* 2.15*   ANION GAP mmol/L 5 3   CALCIUM mg/dL 8.8 8.6   ALBUMIN g/dL  --  3.1*   TOTAL BILIRUBIN mg/dL  --  0.42   ALK PHOS U/L  --  51   ALT U/L  --  5*   AST U/L  --  11*   GLUCOSE RANDOM mg/dL 120 106     Results from last 7 days   Lab Units 12/04/23  1049   INR  1.03     Results from last 7 days   Lab Units 12/07/23  1108 12/07/23  0840 12/06/23  2106 12/06/23  1635 12/06/23  1121 12/06/23  0809 12/05/23  2044 12/05/23  1706 12/05/23  1109 12/05/23  0748 12/04/23  1637 12/04/23  1347   POC GLUCOSE mg/dl 122 155* 198* 130 238* 105 218* 122 103 90 101 98               Lines/Drains:  Invasive Devices       Peripheral Intravenous Line  Duration             Peripheral IV 12/04/23 Left Forearm 3 days                          Imaging: No pertinent imaging reviewed.     Recent Cultures (last 7 days): none        Last 24 Hours Medication List:   Current Facility-Administered Medications   Medication Dose Route Frequency Provider Last Rate    acetaminophen  650 mg Oral Q6H PRN Antonia Abdi PA-C      allopurinol  300 mg Oral Daily Linda Dodge MD      amLODIPine  10 mg Oral Daily Nicho Love MD      ascorbic acid  250 mg Oral Daily Antonia Abdi PA-C      atorvastatin  80 mg Oral HS Linda Dodge MD      calcitriol  0.25 mcg Oral Daily Sheeba Arnold MD      hydrALAZINE  5 mg Intravenous Q6H PRN Julia Porter MD      insulin lispro  1-5 Units Subcutaneous TID AC Sheeba Arnold MD      melatonin  6 mg Oral HS PRN Antonia Abdi PA-C      metoprolol succinate  12.5 mg Oral Daily Antonia Abdi PA-C      multivitamin-minerals  1 tablet Oral Daily Antonia Abdi PA-C      nicotine  1 patch Transdermal Daily Sheeba Arnold MD      pantoprazole  40 mg Intravenous Q12H [address] Antonia Abdi PA-C      polyethylene glycol  4,000 mL Oral See Admin Instructions Leni Gold PA-C          Today, Patient Was Seen By: Julia Porter MD    **Please Note: This note may have been constructed using a voice recognition system. **

## 2023-12-07 NOTE — ASSESSMENT & PLAN NOTE
Patient presented with new onset rectal bleeding, reportedly both melena and hematochezia --now resolving  EGD 12/5/2023 showed ulcer which was clipped  On aspirin and Plavix for hx PAD, resume when clear by GI  IV Protonix BID  GI consulted; Plan for colonoscopy today

## 2023-12-07 NOTE — PLAN OF CARE
Problem: Prexisting or High Potential for Compromised Skin Integrity  Goal: Skin integrity is maintained or improved  Description: INTERVENTIONS:  - Identify patients at risk for skin breakdown  - Assess and monitor skin integrity  - Assess and monitor nutrition and hydration status  - Monitor labs   - Assess for incontinence   - Turn and reposition patient  - Assist with mobility/ambulation  - Relieve pressure over bony prominences  - Avoid friction and shearing  - Provide appropriate hygiene as needed including keeping skin clean and dry  - Evaluate need for skin moisturizer/barrier cream  - Collaborate with interdisciplinary team   - Patient/family teaching  - Consider wound care consult   Outcome: Progressing     Problem: PAIN - ADULT  Goal: Verbalizes/displays adequate comfort level or baseline comfort level  Description: Interventions:  - Encourage patient to monitor pain and request assistance  - Assess pain using appropriate pain scale  - Administer analgesics based on type and severity of pain and evaluate response  - Implement non-pharmacological measures as appropriate and evaluate response  - Consider cultural and social influences on pain and pain management  - Notify physician/advanced practitioner if interventions unsuccessful or patient reports new pain  Outcome: Progressing     Problem: INFECTION - ADULT  Goal: Absence or prevention of progression during hospitalization  Description: INTERVENTIONS:  - Assess and monitor for signs and symptoms of infection  - Monitor lab/diagnostic results  - Monitor all insertion sites, i.e. indwelling lines, tubes, and drains  - Monitor endotracheal if appropriate and nasal secretions for changes in amount and color  - [place] appropriate cooling/warming therapies per order  - Administer medications as ordered  - Instruct and encourage patient and family to use good hand hygiene technique  - Identify and instruct in appropriate isolation precautions for identified infection/condition  Outcome: Progressing  Goal: Absence of fever/infection during neutropenic period  Description: INTERVENTIONS:  - Monitor WBC    Outcome: Progressing     Problem: SAFETY ADULT  Goal: Patient will remain free of falls  Description: INTERVENTIONS:  - Educate patient/family on patient safety including physical limitations  - Instruct patient to call for assistance with activity   - Consult OT/PT to assist with strengthening/mobility   - Keep Call bell within reach  - Keep bed low and locked with side rails adjusted as appropriate  - Keep care items and personal belongings within reach  - Initiate and maintain comfort rounds  - Make Fall Risk Sign visible to staff  - Offer Toileting every  Hours, in advance of need  - Initiate/Maintain alarm  - Obtain necessary fall risk management equipment:   - Apply yellow socks and bracelet for high fall risk patients  - Consider moving patient to room near nurses station  Outcome: Progressing  Goal: Maintain or return to baseline ADL function  Description: INTERVENTIONS:  -  Assess patient's ability to carry out ADLs; assess patient's baseline for ADL function and identify physical deficits which impact ability to perform ADLs (bathing, care of mouth/teeth, toileting, grooming, dressing, etc.)  - Assess/evaluate cause of self-care deficits   - Assess range of motion  - Assess patient's mobility; develop plan if impaired  - Assess patient's need for assistive devices and provide as appropriate  - Encourage maximum independence but intervene and supervise when necessary  - Involve family in performance of ADLs  - Assess for home care needs following discharge   - Consider OT consult to assist with ADL evaluation and planning for discharge  - Provide patient education as appropriate  Outcome: Progressing  Goal: Maintains/Returns to pre admission functional level  Description: INTERVENTIONS:  - Perform AM-PAC 6 Click Basic Mobility/ Daily Activity assessment daily.  - Set and communicate daily mobility goal to care team and patient/family/caregiver. - Collaborate with rehabilitation services on mobility goals if consulted  - Perform Range of Motion  times a day. - Reposition patient every  hours. - Dangle patient  times a day  - Stand patient  times a day  - Ambulate patient  times a day  - Out of bed to chair  times a day   - Out of bed for meals times a day  - Out of bed for toileting  - Record patient progress and toleration of activity level   Outcome: Progressing     Problem: DISCHARGE PLANNING  Goal: Discharge to home or other facility with appropriate resources  Description: INTERVENTIONS:  - Identify barriers to discharge w/patient and caregiver  - Arrange for needed discharge resources and transportation as appropriate  - Identify discharge learning needs (meds, wound care, etc.)  - Arrange for interpretive services to assist at discharge as needed  - Refer to Case Management Department for coordinating discharge planning if the patient needs post-hospital services based on physician/advanced practitioner order or complex needs related to functional status, cognitive ability, or social support system  Outcome: Progressing     Problem: Knowledge Deficit  Goal: Patient/family/caregiver demonstrates understanding of disease process, treatment plan, medications, and discharge instructions  Description: Complete learning assessment and assess knowledge base.   Interventions:  - Provide teaching at level of understanding  - Provide teaching via preferred learning methods  Outcome: Progressing

## 2023-12-07 NOTE — ASSESSMENT & PLAN NOTE
Blood pressure mildly elevated   Continue amlodipine, Toprol-XL with hold parameters  Holding lisinopril and torsemide because of SHOAIB--anticipate could resume tomorrow

## 2023-12-07 NOTE — QUICK NOTE
GI Chart Check    Mr. Roach is being followed for anemia with both melena and hematochezia. EGD on Wednesday, December 6 did show a gastric ulcer Mikey class IIC with a pigmented spot but no active bleeding. Unfortunately patient's hemoglobin continued to drop 8.1 to 7.0 to 6.7 today. He also describes some red blood in his stool yesterday. Patient will be on a liquid diet today and perform a bowel prep later on. The plan is for a colonoscopy with proper Plavix washout for Friday, December 8, 2023. Carina Singh PA-C  [facility]

## 2023-12-07 NOTE — ASSESSMENT & PLAN NOTE
Lab Results   Component Value Date    HGBA1C 6.7 (H) 08/31/2023     Recent Labs     12/06/23  1635 12/06/23  2106 12/07/23  0840 12/07/23  1108   POCGLU 130 198* 155* 122     Blood Sugar Average: Last 72 hrs:  (P) 140  A1c due to be rechecked, can add to am labs  SSI  BS at goal 140-180

## 2023-12-07 NOTE — PLAN OF CARE
Problem: Prexisting or High Potential for Compromised Skin Integrity  Goal: Skin integrity is maintained or improved  Description: INTERVENTIONS:  - Identify patients at risk for skin breakdown  - Assess and monitor skin integrity  - Assess and monitor nutrition and hydration status  - Monitor labs   - Assess for incontinence   - Turn and reposition patient  - Assist with mobility/ambulation  - Relieve pressure over bony prominences  - Avoid friction and shearing  - Provide appropriate hygiene as needed including keeping skin clean and dry  - Evaluate need for skin moisturizer/barrier cream  - Collaborate with interdisciplinary team   - Patient/family teaching  - Consider wound care consult   Outcome: Progressing     Problem: PAIN - ADULT  Goal: Verbalizes/displays adequate comfort level or baseline comfort level  Description: Interventions:  - Encourage patient to monitor pain and request assistance  - Assess pain using appropriate pain scale  - Administer analgesics based on type and severity of pain and evaluate response  - Implement non-pharmacological measures as appropriate and evaluate response  - Consider cultural and social influences on pain and pain management  - Notify physician/advanced practitioner if interventions unsuccessful or patient reports new pain  Outcome: Progressing     Problem: INFECTION - ADULT  Goal: Absence or prevention of progression during hospitalization  Description: INTERVENTIONS:  - Assess and monitor for signs and symptoms of infection  - Monitor lab/diagnostic results  - Monitor all insertion sites, i.e. indwelling lines, tubes, and drains  - Monitor endotracheal if appropriate and nasal secretions for changes in amount and color  - [place] appropriate cooling/warming therapies per order  - Administer medications as ordered  - Instruct and encourage patient and family to use good hand hygiene technique  - Identify and instruct in appropriate isolation precautions for identified infection/condition  Outcome: Progressing  Goal: Absence of fever/infection during neutropenic period  Description: INTERVENTIONS:  - Monitor WBC    Outcome: Progressing     Problem: SAFETY ADULT  Goal: Patient will remain free of falls  Description: INTERVENTIONS:  - Educate patient/family on patient safety including physical limitations  - Instruct patient to call for assistance with activity   - Consult OT/PT to assist with strengthening/mobility   - Keep Call bell within reach  - Keep bed low and locked with side rails adjusted as appropriate  - Keep care items and personal belongings within reach  - Initiate and maintain comfort rounds  - Make Fall Risk Sign visible to staff  - Offer Toileting every 2 Hours, in advance of need  - Initiate/Maintain bed alarm  - Obtain necessary fall risk management equipment: bed alarm  - Apply yellow socks and bracelet for high fall risk patients  - Consider moving patient to room near nurses station  Outcome: Progressing  Goal: Maintain or return to baseline ADL function  Description: INTERVENTIONS:  -  Assess patient's ability to carry out ADLs; assess patient's baseline for ADL function and identify physical deficits which impact ability to perform ADLs (bathing, care of mouth/teeth, toileting, grooming, dressing, etc.)  - Assess/evaluate cause of self-care deficits   - Assess range of motion  - Assess patient's mobility; develop plan if impaired  - Assess patient's need for assistive devices and provide as appropriate  - Encourage maximum independence but intervene and supervise when necessary  - Involve family in performance of ADLs  - Assess for home care needs following discharge   - Consider OT consult to assist with ADL evaluation and planning for discharge  - Provide patient education as appropriate  Outcome: Progressing  Goal: Maintains/Returns to pre admission functional level  Description: INTERVENTIONS:  - Perform AM-PAC 6 Click Basic Mobility/ Daily Activity assessment daily.  - Set and communicate daily mobility goal to care team and patient/family/caregiver. - Collaborate with rehabilitation services on mobility goals if consulted  - Perform Range of Motion 2 times a day. - Reposition patient every 2 hours. - Dangle patient 2 times a day  - Stand patient 2 times a day  - Ambulate patient 3 times a day  - Out of bed to chair 3 times a day   - Out of bed for meals 3 times a day  - Out of bed for toileting  - Record patient progress and toleration of activity level   Outcome: Progressing     Problem: DISCHARGE PLANNING  Goal: Discharge to home or other facility with appropriate resources  Description: INTERVENTIONS:  - Identify barriers to discharge w/patient and caregiver  - Arrange for needed discharge resources and transportation as appropriate  - Identify discharge learning needs (meds, wound care, etc.)  - Arrange for interpretive services to assist at discharge as needed  - Refer to Case Management Department for coordinating discharge planning if the patient needs post-hospital services based on physician/advanced practitioner order or complex needs related to functional status, cognitive ability, or social support system  Outcome: Progressing     Problem: Knowledge Deficit  Goal: Patient/family/caregiver demonstrates understanding of disease process, treatment plan, medications, and discharge instructions  Description: Complete learning assessment and assess knowledge base.   Interventions:  - Provide teaching at level of understanding  - Provide teaching via preferred learning methods  Outcome: Progressing

## 2023-12-08 ENCOUNTER — ANESTHESIA (INPATIENT)
Dept: GASTROENTEROLOGY | Facility: HOSPITAL | Age: 78
DRG: 378 | End: 2023-12-08
Payer: MEDICARE

## 2023-12-08 ENCOUNTER — ANESTHESIA EVENT (INPATIENT)
Dept: GASTROENTEROLOGY | Facility: HOSPITAL | Age: 78
DRG: 378 | End: 2023-12-08
Payer: MEDICARE

## 2023-12-08 ENCOUNTER — APPOINTMENT (INPATIENT)
Dept: GASTROENTEROLOGY | Facility: HOSPITAL | Age: 78
DRG: 378 | End: 2023-12-08
Payer: MEDICARE

## 2023-12-08 LAB
ABO GROUP BLD BPU: NORMAL
ABO GROUP BLD BPU: NORMAL
BPU ID: NORMAL
BPU ID: NORMAL
CROSSMATCH: NORMAL
CROSSMATCH: NORMAL
GLUCOSE SERPL-MCNC: 112 MG/DL (ref 65–140)
GLUCOSE SERPL-MCNC: 129 MG/DL (ref 65–140)
GLUCOSE SERPL-MCNC: 237 MG/DL (ref 65–140)
GLUCOSE SERPL-MCNC: 94 MG/DL (ref 65–140)
HCT VFR BLD AUTO: 25 % (ref 36.5–49.3)
HCT VFR BLD AUTO: 27 % (ref 36.5–49.3)
HGB BLD-MCNC: 8.1 G/DL (ref 12–17)
HGB BLD-MCNC: 8.8 G/DL (ref 12–17)
UNIT DISPENSE STATUS: NORMAL
UNIT DISPENSE STATUS: NORMAL
UNIT PRODUCT CODE: NORMAL
UNIT PRODUCT CODE: NORMAL
UNIT PRODUCT VOLUME: 350 ML
UNIT PRODUCT VOLUME: 350 ML
UNIT RH: NORMAL
UNIT RH: NORMAL

## 2023-12-08 PROCEDURE — 99232 SBSQ HOSP IP/OBS MODERATE 35: CPT | Performed by: PHYSICIAN ASSISTANT

## 2023-12-08 PROCEDURE — 85018 HEMOGLOBIN: CPT | Performed by: PHYSICIAN ASSISTANT

## 2023-12-08 PROCEDURE — 0DJD8ZZ INSPECTION OF LOWER INTESTINAL TRACT, VIA NATURAL OR ARTIFICIAL OPENING ENDOSCOPIC: ICD-10-PCS | Performed by: INTERNAL MEDICINE

## 2023-12-08 PROCEDURE — 82948 REAGENT STRIP/BLOOD GLUCOSE: CPT

## 2023-12-08 PROCEDURE — C9113 INJ PANTOPRAZOLE SODIUM, VIA: HCPCS | Performed by: PHYSICIAN ASSISTANT

## 2023-12-08 PROCEDURE — 85014 HEMATOCRIT: CPT | Performed by: PHYSICIAN ASSISTANT

## 2023-12-08 RX ORDER — CLOPIDOGREL BISULFATE 75 MG/1
75 TABLET ORAL DAILY
Status: DISCONTINUED | OUTPATIENT
Start: 2023-12-09 | End: 2023-12-10 | Stop reason: HOSPADM

## 2023-12-08 RX ORDER — PROPOFOL 10 MG/ML
INJECTION, EMULSION INTRAVENOUS AS NEEDED
Status: DISCONTINUED | OUTPATIENT
Start: 2023-12-08 | End: 2023-12-08

## 2023-12-08 RX ORDER — PROPOFOL 10 MG/ML
INJECTION, EMULSION INTRAVENOUS CONTINUOUS PRN
Status: DISCONTINUED | OUTPATIENT
Start: 2023-12-08 | End: 2023-12-08

## 2023-12-08 RX ORDER — SODIUM CHLORIDE, SODIUM LACTATE, POTASSIUM CHLORIDE, CALCIUM CHLORIDE 600; 310; 30; 20 MG/100ML; MG/100ML; MG/100ML; MG/100ML
INJECTION, SOLUTION INTRAVENOUS CONTINUOUS PRN
Status: DISCONTINUED | OUTPATIENT
Start: 2023-12-08 | End: 2023-12-08

## 2023-12-08 RX ADMIN — ATORVASTATIN CALCIUM 80 MG: 40 TABLET, FILM COATED ORAL at 21:06

## 2023-12-08 RX ADMIN — Medication 6 MG: at 21:35

## 2023-12-08 RX ADMIN — AMLODIPINE BESYLATE 10 MG: 10 TABLET ORAL at 08:56

## 2023-12-08 RX ADMIN — NICOTINE 1 PATCH: 21 PATCH, EXTENDED RELEASE TRANSDERMAL at 08:56

## 2023-12-08 RX ADMIN — OXYCODONE HYDROCHLORIDE AND ACETAMINOPHEN 250 MG: 500 TABLET ORAL at 08:54

## 2023-12-08 RX ADMIN — PROPOFOL 80 MCG/KG/MIN: 10 INJECTION, EMULSION INTRAVENOUS at 15:20

## 2023-12-08 RX ADMIN — PROPOFOL 30 MG: 10 INJECTION, EMULSION INTRAVENOUS at 15:24

## 2023-12-08 RX ADMIN — SODIUM CHLORIDE, SODIUM LACTATE, POTASSIUM CHLORIDE, AND CALCIUM CHLORIDE: .6; .31; .03; .02 INJECTION, SOLUTION INTRAVENOUS at 15:14

## 2023-12-08 RX ADMIN — PANCRELIPASE 24000 UNITS: 24000; 76000; 120000 CAPSULE, DELAYED RELEASE PELLETS ORAL at 21:35

## 2023-12-08 RX ADMIN — PANTOPRAZOLE SODIUM 40 MG: 40 INJECTION, POWDER, FOR SOLUTION INTRAVENOUS at 08:58

## 2023-12-08 RX ADMIN — METOPROLOL SUCCINATE 12.5 MG: 25 TABLET, EXTENDED RELEASE ORAL at 08:56

## 2023-12-08 RX ADMIN — ALLOPURINOL 300 MG: 300 TABLET ORAL at 08:56

## 2023-12-08 RX ADMIN — MULTIPLE VITAMINS W/ MINERALS TAB 1 TABLET: TAB ORAL at 08:56

## 2023-12-08 RX ADMIN — PROPOFOL 70 MG: 10 INJECTION, EMULSION INTRAVENOUS at 15:20

## 2023-12-08 RX ADMIN — CALCITRIOL 0.25 MCG: 0.25 CAPSULE, LIQUID FILLED ORAL at 08:56

## 2023-12-08 RX ADMIN — PANTOPRAZOLE SODIUM 40 MG: 40 INJECTION, POWDER, FOR SOLUTION INTRAVENOUS at 21:06

## 2023-12-08 NOTE — APP STUDENT NOTE
[facility] STUDENT  Inpatient Progress Note for TRAINING ONLY  Not Part of Legal Medical Record     Progress Note - Jorge Mae. 78 y.o. male MRN: [id number]  Unit/Bed#: S -01 Encounter: [id number]          GI bleed  -Patient presented to the ER with melena and hematochezia. Bleeding started two days prior to admission. -GI was consulted and performed EGD, which found an ulcer at the fundus of the stomach with pigmented spot (Mikey class IIC). They ablated the ulcer and placed 4 clips.   -Colonoscopy is scheduled for today, 12/8- currently NPO.  -Monitor I & Os, CBC    Acute Blood Loss anemia   -Hemoglobin was 7.4 upon admission. The patient was transfused with 1 unit leukoreduced RBC at this time.   -An addition 1 unit of leukoreduced RBCs was given on 12/7 with hemoglobin of 6.7.   -Transfuse at <7  -Hold ASA and clopidogrel for now. -Repeat H & H stat, then q 8 hrs. SHOAIB on chronic stage 4 kidney disease-resolved  -Upon admission creatinine was 2.93, with baseline around 2.0.   -Currently creatinine is back to baseline at 2.01  -Continue holding lisinopril and torsemide 20 mg.  -Continue with Calcitrol supplementation. S/P TAVR  --Hold ASA and clopidogrel for now. Primary HTN  -Continue amlodipine and Toprol-XL   -Hydralazine as needed. Diabetes Mellitus Type 2  -Continue with sliding insulin scale  -Continue with pancrelipase. 7. Arterial insufficiency  -Patient reports history of coronary artery bypass grafts and lower extremity grafts ? ?  -hold ASA and clopidogrel. Code Status: Level 1 - Full Code    Subjective: On HD # 4. Patient was started on a bowel prep last night for coloscopy today. The patient explains his stool was brownish-red in color immediately after starting prep. After a few hours the red discoloration disappeared. Denies black colored stool overnight. Has been NPO since midnight. He denies abdominal pain, nausea, and vomiting.  Complains of fatigue but denies dizziness, numbness, or weakness. Denies chest palpitations, chest pain, or shortness of breath. Objective:     Vitals:   Temp (24hrs), Av °F (36.7 °C), Min:98 °F (36.7 °C), Max:98 °F (36.7 °C)    Temp:  [98 °F (36.7 °C)] 98 °F (36.7 °C)  HR:  [50-65] 61  Resp:  [17] 17  BP: (141-157)/(40-51) 149/51  SpO2:  [95 %-98 %] 96 %  Body mass index is 24.36 kg/m². Physical Exam:   General: Patient appears to be tired. Under no acute distress. HEENT: PERRLA. CV: Systolic murmur best hear at the 2nd left intercostal space. Regular rate and rhythm. PV: No lower extremity edema. Pulm: Equal lung expansion bilaterally, no wheezing, rales, or rhonchi. GI: Abdomen is non-distended with normal bowel sounds. Non-tender to light and deep palpations in all four quadrants. Neuro: Alert and oriented to person, place, and time. No focal neurological deficits were noted. 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 ([address]) 2023    Basal cell carcinoma     right cheek    CAD (coronary artery disease)     Carotid stenosis, asymptomatic, bilateral     Chronic kidney disease     Colon polyp     Diabetes ([address])     type 2, non-insulin dependent    Diabetes mellitus (HCC)     GERD (gastroesophageal reflux disease)     History of nephrolithiasis     Hyperlipidemia     Hypertension     Left foot pain 2022    PAD (peripheral artery disease) (HCC)     Severe aortic stenosis     Squamous cell skin cancer 2022    left superior helix     Past Surgical History:   Procedure Laterality Date    APPENDECTOMY      CARDIAC CATHETERIZATION N/A 2022    Procedure: CARDIAC RHC/LHC; Surgeon: Chanel Damon DO;  [facility];   Service: Cardiology    CARDIAC CATHETERIZATION N/A 05/05/2022    Procedure: Cardiac Coronary Angiogram;  Surgeon: Dru Smith DO;  [facility]; Service: Cardiology    CARDIAC CATHETERIZATION N/A 05/24/2022    Procedure: Cardiac pci;  Surgeon: Conner Diana MD;  [facility];   Service: Cardiology    CARDIAC CATHETERIZATION N/A 06/14/2022    Procedure: CARDIAC TAVR;  Surgeon: Kat Sanchez MD;  Location: [facility];  Service: Cardiology    COLECTOMY      COLONOSCOPY  2013    CORONARY ARTERY BYPASS GRAFT  2013    X 2    FEMORAL ARTERY - POPLITEAL ARTERY BYPASS GRAFT      HEMORRHOID SURGERY      IR AORTAGRAM WITH RUN-OFF  11/19/2018    IR AORTAGRAM WITH RUN-OFF  3/2/2023    IR LOWER EXTREMITY ANGIOGRAM  3/23/2023    MOHS SURGERY Left 01/11/2023    SCC left superior helix-Dr Guy    PA SLCTV CATHJ 3RD+ ORD SLCTV ABDL PEL/LXTR [facility] Left 08/12/2016    Procedure: LEFT FEMORAL ARTERIOGRAM; BALLOON ANGIOPLASTY; SFA  AND FEMORAL AT VEIN GRAFT;  Surgeon: Germain Coley MD;  Location: [facility];  Service: Vascular    PA TEAEC W/WO PATCH GRAFT COMMON FEMORAL Right 3/23/2023    Procedure: Common femoral endarterectomy&antegrade intervention of SFA, popliteal artery w/ shockwave;  Surgeon: Dilip Larose MD;  Location: [facility];  Service: Vascular    PA TRANSCATHETER TRANSAPICAL REPLACEMT AORTIC VALVE N/A 06/14/2022    Procedure: REPLACEMENT AORTIC VALVE TRANSCATHETER (TAVR) TRANSAPICAL 29MM IRVIN KYLER S3 ULTRA VALVE(ACCESS ON LEFT) ELANA;  Surgeon: Do Juarez DO;  Location: [facility];  Service: Cardiac Surgery    SKIN CANCER EXCISION      TONSILLECTOMY AND ADENOIDECTOMY       Social History   Social History     Substance and Sexual Activity   Alcohol Use Not Currently    Comment: rare     Social History     Substance and Sexual Activity   Drug Use No     Social History     Tobacco Use   Smoking Status Every Day    Packs/day: 0.25    Years: 50.00    Total pack years: 12.50    Types: Cigarettes    Passive exposure: Current   Smokeless Tobacco Never     Family History: {[facility] FAM HISTORY SMARTLIST:[id number]}    Meds/Allergies   {[facility] H&P LHAY:[id number]}  No Known Allergies    Additional Data:     Labs:    Results from last 7 days   Lab Units 12/07/23  1624 12/07/23  0444 12/04/23  1750 12/04/23  1049   WBC Thousand/uL  --  4.31   < > 7.15   HEMOGLOBIN g/dL 7.8* 6.7*   < > 7.4*   HEMATOCRIT % 23.8* 21.1*   < > 23.8*   PLATELETS Thousands/uL  --  123*   < > 139*   NEUTROS PCT %  --   --   --  85*   LYMPHS PCT %  --   --   --  10*   MONOS PCT %  --   --   --  4   EOS PCT %  --   --   --  1    < > = values in this interval not displayed. Results from last 7 days   Lab Units 12/07/23  0444 12/06/23  0548   SODIUM mmol/L 141 141   POTASSIUM mmol/L 4.1 4.0   CHLORIDE mmol/L 111* 112*   CO2 mmol/L 25 26   BUN mg/dL 44* 47*   CREATININE mg/dL 2.01* 2.15*   ANION GAP mmol/L 5 3   CALCIUM mg/dL 8.8 8.6   ALBUMIN g/dL  --  3.1*   TOTAL BILIRUBIN mg/dL  --  0.42   ALK PHOS U/L  --  51   ALT U/L  --  5*   AST U/L  --  11*   GLUCOSE RANDOM mg/dL 120 106     Results from last 7 days   Lab Units 12/04/23  1049   INR  1.03     Results from last 7 days   Lab Units 12/08/23  0801 12/07/23  1639 12/07/23  1108 12/07/23  0840 12/06/23  2106 12/06/23  1635 12/06/23  1121 12/06/23  0809 12/05/23  2044 12/05/23  1706 12/05/23  1109 12/05/23  0748   POC GLUCOSE mg/dl 94 117 122 155* 198* 130 238* 105 218* 122 103 90                 * I Have Reviewed All Lab Data Listed Above.   * Additional Pertinent Lab Tests Reviewed: {Labreview:93460}    Imaging:    Imaging Reports Reviewed Today Include: ***  Imaging Personally Reviewed by Myself Includes:  ***    Recent Cultures (last 7 days):           Last 24 Hours Medication List:   Current Facility-Administered Medications   Medication Dose Route Frequency Provider Last Rate    acetaminophen  650 mg Oral Q6H PRN Antonia Abdi PA-C      allopurinol  300 mg Oral Daily Sheeba Arnold MD amLODIPine  10 mg Oral Daily Isidoro Tucker MD      ascorbic acid  250 mg Oral Daily Antonia Abdi PA-C      atorvastatin  80 mg Oral HS Andres Andre MD      calcitriol  0.25 mcg Oral Daily Andres Andre MD      hydrALAZINE  5 mg Intravenous Q6H PRN Isidoro Tucker MD      insulin lispro  1-5 Units Subcutaneous TID AC Andres Andre MD      melatonin  6 mg Oral HS PRN Antonia Abdi PA-C      metoprolol succinate  12.5 mg Oral Daily Antonia Abdi PA-C      multivitamin-minerals  1 tablet Oral Daily Antonia Abdi PA-C      nicotine  1 patch Transdermal Daily Andres Andre MD      pantoprazole  40 mg Intravenous Q12H [address] Antonia Abdi PA-C      polyethylene glycol  4,000 mL Oral See Admin Instructions Alyse Horner PA-C          Today, Patient Was Seen By: Sammy Mccullough    ** Please Note: Dictation voice to text software may have been used in the creation of this document.  **

## 2023-12-08 NOTE — PLAN OF CARE
Problem: Prexisting or High Potential for Compromised Skin Integrity  Goal: Skin integrity is maintained or improved  Description: INTERVENTIONS:  - Identify patients at risk for skin breakdown  - Assess and monitor skin integrity  - Assess and monitor nutrition and hydration status  - Monitor labs   - Assess for incontinence   - Turn and reposition patient  - Assist with mobility/ambulation  - Relieve pressure over bony prominences  - Avoid friction and shearing  - Provide appropriate hygiene as needed including keeping skin clean and dry  - Evaluate need for skin moisturizer/barrier cream  - Collaborate with interdisciplinary team   - Patient/family teaching  - Consider wound care consult   Outcome: Progressing     Problem: PAIN - ADULT  Goal: Verbalizes/displays adequate comfort level or baseline comfort level  Description: Interventions:  - Encourage patient to monitor pain and request assistance  - Assess pain using appropriate pain scale  - Administer analgesics based on type and severity of pain and evaluate response  - Implement non-pharmacological measures as appropriate and evaluate response  - Consider cultural and social influences on pain and pain management  - Notify physician/advanced practitioner if interventions unsuccessful or patient reports new pain  Outcome: Progressing     Problem: INFECTION - ADULT  Goal: Absence or prevention of progression during hospitalization  Description: INTERVENTIONS:  - Assess and monitor for signs and symptoms of infection  - Monitor lab/diagnostic results  - Monitor all insertion sites, i.e. indwelling lines, tubes, and drains  - Monitor endotracheal if appropriate and nasal secretions for changes in amount and color  - [place] appropriate cooling/warming therapies per order  - Administer medications as ordered  - Instruct and encourage patient and family to use good hand hygiene technique  - Identify and instruct in appropriate isolation precautions for identified infection/condition  Outcome: Progressing  Goal: Absence of fever/infection during neutropenic period  Description: INTERVENTIONS:  - Monitor WBC    Outcome: Progressing     Problem: SAFETY ADULT  Goal: Patient will remain free of falls  Description: INTERVENTIONS:  - Educate patient/family on patient safety including physical limitations  - Instruct patient to call for assistance with activity   - Consult OT/PT to assist with strengthening/mobility   - Keep Call bell within reach  - Keep bed low and locked with side rails adjusted as appropriate  - Keep care items and personal belongings within reach  - Initiate and maintain comfort rounds  - Make Fall Risk Sign visible to staff  - Offer Toileting every  Hours, in advance of need  - Initiate/Maintain alarm  - Obtain necessary fall risk management equipment:   - Apply yellow socks and bracelet for high fall risk patients  - Consider moving patient to room near nurses station  Outcome: Progressing  Goal: Maintain or return to baseline ADL function  Description: INTERVENTIONS:  -  Assess patient's ability to carry out ADLs; assess patient's baseline for ADL function and identify physical deficits which impact ability to perform ADLs (bathing, care of mouth/teeth, toileting, grooming, dressing, etc.)  - Assess/evaluate cause of self-care deficits   - Assess range of motion  - Assess patient's mobility; develop plan if impaired  - Assess patient's need for assistive devices and provide as appropriate  - Encourage maximum independence but intervene and supervise when necessary  - Involve family in performance of ADLs  - Assess for home care needs following discharge   - Consider OT consult to assist with ADL evaluation and planning for discharge  - Provide patient education as appropriate  Outcome: Progressing  Goal: Maintains/Returns to pre admission functional level  Description: INTERVENTIONS:  - Perform AM-PAC 6 Click Basic Mobility/ Daily Activity assessment daily.  - Set and communicate daily mobility goal to care team and patient/family/caregiver. - Collaborate with rehabilitation services on mobility goals if consulted  - Perform Range of Motion  times a day. - Reposition patient every  hours. - Dangle patient  times a day  - Stand patient times a day  - Ambulate patient times a day  - Out of bed to chair  times a day   - Out of bed for meals  times a day  - Out of bed for toileting  - Record patient progress and toleration of activity level   Outcome: Progressing     Problem: DISCHARGE PLANNING  Goal: Discharge to home or other facility with appropriate resources  Description: INTERVENTIONS:  - Identify barriers to discharge w/patient and caregiver  - Arrange for needed discharge resources and transportation as appropriate  - Identify discharge learning needs (meds, wound care, etc.)  - Arrange for interpretive services to assist at discharge as needed  - Refer to Case Management Department for coordinating discharge planning if the patient needs post-hospital services based on physician/advanced practitioner order or complex needs related to functional status, cognitive ability, or social support system  Outcome: Progressing     Problem: Knowledge Deficit  Goal: Patient/family/caregiver demonstrates understanding of disease process, treatment plan, medications, and discharge instructions  Description: Complete learning assessment and assess knowledge base.   Interventions:  - Provide teaching at level of understanding  - Provide teaching via preferred learning methods  Outcome: Progressing

## 2023-12-08 NOTE — ANESTHESIA PROCEDURE NOTES
Anesthesia Notable Event  No anethesia notable event occurred.     Date/Time: 12/8/2023 4:36 PM    Performed by: Mio Kelly MD  Authorized by: Mio Kelly MD

## 2023-12-08 NOTE — ANESTHESIA POSTPROCEDURE EVALUATION
Post-Op Assessment Note    CV Status:  Stable  Pain Score: 0    Pain management: adequate       Mental Status:  Sleepy   Hydration Status:  Euvolemic   PONV Controlled:  Controlled   Airway Patency:  Patent     Post Op Vitals Reviewed: Yes      Staff: CRNA   Comments: Pt able to maintain own airway, VSS, report to recovery RN              BP (!) 109/48 (12/08/23 1545)    Temp (!) 96.8 °F (36 °C) (12/08/23 1545)    Pulse (!) 48 (12/08/23 1545)   Resp 16 (12/08/23 1545)    SpO2 100 % (12/08/23 1545)

## 2023-12-08 NOTE — PROGRESS NOTES
[facility]  Progress Note  Name: Lutricia Lefort. I  MRN: [id number]  Unit/Bed#: S -01 I Date of Admission: 12/4/2023   Date of Service: 12/8/2023 I Hospital Day: 4    Assessment/Plan   * GI bleed  Assessment & Plan  Patient presented with new onset rectal bleeding, reportedly both melena and hematochezia --now resolving  EGD 12/5/2023 showed ulcer which was clipped  On aspirin and Plavix for hx PAD, resume when clear by GI  IV Protonix BID  GI consulted; Plan for colonoscopy today      Acute blood loss anemia  Assessment & Plan  Patient with evidence of chronic anemia who presented with rectal bleeding and drop in hgb to 6.7, prompting PRBC transfusion. Currently improved to 8.8  Continue to trend hemoglobin every 12 hours  See plan above    PVD (peripheral vascular disease) ([address])  Assessment & Plan  Follows with Dr. Amos Toussaint   History of R fem-endarterectomy, R SFA, popliteal and AT angioplasty on 3/23/23. DAPT on hold right now, resume when ok per GI    Acute renal failure superimposed on stage 4 chronic kidney disease [facility])  Assessment & Plan  Lab Results   Component Value Date    EGFR 30 12/07/2023    EGFR 28 12/06/2023    EGFR 21 12/05/2023    CREATININE 2.01 (H) 12/07/2023    CREATININE 2.15 (H) 12/06/2023    CREATININE 2.64 (H) 12/05/2023     Follows with Dr. Coffman Mode creatinine of 2.2-2.3 presented with creatinine of 2.9 suspect secondary to GI bleed and volume depletion. Resolved with IV fluid. Currently lisinopril and diuretics on hold.   Creat 2.01    Controlled type 2 diabetes mellitus with stage 4 chronic kidney disease, without long-term current use of insulin [facility])  Assessment & Plan  Lab Results   Component Value Date    HGBA1C 6.7 (H) 08/31/2023     Recent Labs     12/06/23  1635 12/06/23  2106 12/07/23  0840 12/07/23  1108   POCGLU 130 198* 155* 122     Blood Sugar Average: Last 72 hrs:  (P) 140  A1c due to be rechecked, can add to am labs  SSI  BS at goal 140-180      Chronic diastolic CHF (congestive heart failure) (HCC)  Assessment & Plan    Appears euvolemic. Hold torsemide for now        Primary hypertension  Assessment & Plan  Blood pressure mildly elevated   Continue amlodipine, Toprol-XL with hold parameters  Holding lisinopril and torsemide because of SHOAIB--anticipate could resume tomorrow    CAD (coronary artery disease)  Assessment & Plan  No recent history of drug-eluting stent  Holding ASA  Continue BB/statin  Follow up Dr. Ele Chaudhry           VTE Pharmacologic Prophylaxis: VTE Score: 3 held d/t GIB    Mobility:   Basic Mobility Inpatient Raw Score: 22  JH-HLM Goal: 7: Walk 25 feet or more  JH-HLM Achieved: 7: Walk 25 feet or more  HLM Goal achieved. Continue to encourage appropriate mobility. Patient Centered Rounds: I performed bedside rounds with nursing staff today. Discussions with Specialists or Other Care Team Provider: case mgmt, GI    Education and Discussions with Family / Patient: Patient declined call to . Total Time Spent on Date of Encounter in care of patient: 30 mins. This time was spent on one or more of the following: performing physical exam; counseling and coordination of care; obtaining or reviewing history; documenting in the medical record; reviewing/ordering tests, medications or procedures; communicating with other healthcare professionals and discussing with patient's family/caregivers. Current Length of Stay: 4 day(s)  Current Patient Status: Inpatient   Certification Statement: The patient will continue to require additional inpatient hospital stay due to anemia and c-scope  Discharge Plan: Anticipate discharge tomorrow to home. Code Status: Level 1 - Full Code    Subjective:   Patient reports he was having some brownish-red blood initially when he started his colon prep but now it has resolved. No reports of abdominal pain.     Objective:     Vitals:   Temp (24hrs), Av °F (36.7 °C), Min:98 °F (36.7 °C), Max:98 °F (36.7 °C)    Temp:  [98 °F (36.7 °C)] 98 °F (36.7 °C)  HR:  [50-65] 61  Resp:  [17] 17  BP: (141-157)/(40-51) 149/51  SpO2:  [95 %-98 %] 96 %  Body mass index is 24.36 kg/m². Input and Output Summary (last 24 hours):   No intake or output data in the 24 hours ending 12/08/23 1326    Physical Exam:   Physical Exam  Vitals reviewed. Constitutional:       General: He is not in acute distress. Appearance: Normal appearance. He is not ill-appearing, toxic-appearing or diaphoretic. Comments: Well-appearing gentleman seen sitting up in bedside chair   Eyes:      General: No scleral icterus. Right eye: No discharge. Left eye: No discharge. Conjunctiva/sclera: Conjunctivae normal.   Cardiovascular:      Rate and Rhythm: Normal rate and regular rhythm. Heart sounds: Murmur (soft systolic murmur) heard. Pulmonary:      Effort: No respiratory distress. Breath sounds: No stridor. No wheezing, rhonchi or rales. Abdominal:      General: There is no distension. Tenderness: There is no abdominal tenderness. There is no guarding. Musculoskeletal:         General: No swelling, tenderness, deformity or signs of injury. Right lower leg: No edema. Left lower leg: No edema. Skin:     Coloration: Skin is not jaundiced or pale. Findings: No bruising, erythema, lesion or rash. Neurological:      General: No focal deficit present. Mental Status: He is alert. Mental status is at baseline. Comments: Awake alert interactive no confusion   Psychiatric:         Mood and Affect: Mood normal.         Thought Content:  Thought content normal.          Additional Data:     Labs:  Results from last 7 days   Lab Units 12/08/23  1228 12/07/23  1624 12/07/23  0444 12/04/23  1750 12/04/23  1049   WBC Thousand/uL  --   --  4.31   < > 7.15   HEMOGLOBIN g/dL 8.8*   < > 6.7*   < > 7.4*   HEMATOCRIT % 27.0*   < > 21.1*   < > 23.8*   PLATELETS Thousands/uL  --   --  123*   < > 139*   NEUTROS PCT %  --   --   --   --  85*   LYMPHS PCT %  --   --   --   --  10*   MONOS PCT %  --   --   --   --  4   EOS PCT %  --   --   --   --  1    < > = values in this interval not displayed.      Results from last 7 days   Lab Units 12/07/23  0444 12/06/23  0548   SODIUM mmol/L 141 141   POTASSIUM mmol/L 4.1 4.0   CHLORIDE mmol/L 111* 112*   CO2 mmol/L 25 26   BUN mg/dL 44* 47*   CREATININE mg/dL 2.01* 2.15*   ANION GAP mmol/L 5 3   CALCIUM mg/dL 8.8 8.6   ALBUMIN g/dL  --  3.1*   TOTAL BILIRUBIN mg/dL  --  0.42   ALK PHOS U/L  --  51   ALT U/L  --  5*   AST U/L  --  11*   GLUCOSE RANDOM mg/dL 120 106     Results from last 7 days   Lab Units 12/04/23  1049   INR  1.03     Results from last 7 days   Lab Units 12/08/23  1128 12/08/23  0801 12/07/23  1639 12/07/23  1108 12/07/23  0840 12/06/23  2106 12/06/23  1635 12/06/23  1121 12/06/23  0809 12/05/23  2044 12/05/23  1706 12/05/23  1109   POC GLUCOSE mg/dl 112 94 117 122 155* 198* 130 238* 105 218* 122 103               Lines/Drains:  Invasive Devices       Peripheral Intravenous Line  Duration             Peripheral IV 12/04/23 Left Forearm 4 days                          Imaging:     Recent Cultures (last 7 days):         Last 24 Hours Medication List:   Current Facility-Administered Medications   Medication Dose Route Frequency Provider Last Rate    acetaminophen  650 mg Oral Q6H PRN Antonia Abdi PA-C      allopurinol  300 mg Oral Daily Kathryn Rodriguez MD      amLODIPine  10 mg Oral Daily Elise Randall MD      ascorbic acid  250 mg Oral Daily Antonia Abdi PA-C      atorvastatin  80 mg Oral HS Kathryn Rodriguez MD      calcitriol  0.25 mcg Oral Daily Kathryn Rodriguez MD      hydrALAZINE  5 mg Intravenous Q6H PRN Elise Randall MD      insulin lispro  1-5 Units Subcutaneous TID AC Kathryn Rodriguez MD      melatonin  6 mg Oral HS PRN Antonia Abdi PA-C      metoprolol succinate  12.5 mg Oral Daily Carine Ty PA-C multivitamin-minerals  1 tablet Oral Daily Antonia Abdi PA-C      nicotine  1 patch Transdermal Daily Jesu Abbott MD      pantoprazole  40 mg Intravenous Q12H [facility] Antonia Abdi PA-C      polyethylene glycol  4,000 mL Oral See Admin Instructions Sugar Portillo PA-C          Today, Patient Was Seen By: Brad Reyna PA-C    **Please Note: This note may have been constructed using a voice recognition system. **

## 2023-12-08 NOTE — PLAN OF CARE
Problem: Prexisting or High Potential for Compromised Skin Integrity  Goal: Skin integrity is maintained or improved  Description: INTERVENTIONS:  - Identify patients at risk for skin breakdown  - Assess and monitor skin integrity  - Assess and monitor nutrition and hydration status  - Monitor labs   - Assess for incontinence   - Turn and reposition patient  - Assist with mobility/ambulation  - Relieve pressure over bony prominences  - Avoid friction and shearing  - Provide appropriate hygiene as needed including keeping skin clean and dry  - Evaluate need for skin moisturizer/barrier cream  - Collaborate with interdisciplinary team   - Patient/family teaching  - Consider wound care consult   Outcome: Progressing     Problem: PAIN - ADULT  Goal: Verbalizes/displays adequate comfort level or baseline comfort level  Description: Interventions:  - Encourage patient to monitor pain and request assistance  - Assess pain using appropriate pain scale  - Administer analgesics based on type and severity of pain and evaluate response  - Implement non-pharmacological measures as appropriate and evaluate response  - Consider cultural and social influences on pain and pain management  - Notify physician/advanced practitioner if interventions unsuccessful or patient reports new pain  Outcome: Progressing     Problem: INFECTION - ADULT  Goal: Absence or prevention of progression during hospitalization  Description: INTERVENTIONS:  - Assess and monitor for signs and symptoms of infection  - Monitor lab/diagnostic results  - Monitor all insertion sites, i.e. indwelling lines, tubes, and drains  - Monitor endotracheal if appropriate and nasal secretions for changes in amount and color  - [place] appropriate cooling/warming therapies per order  - Administer medications as ordered  - Instruct and encourage patient and family to use good hand hygiene technique  - Identify and instruct in appropriate isolation precautions for identified infection/condition  Outcome: Progressing  Goal: Absence of fever/infection during neutropenic period  Description: INTERVENTIONS:  - Monitor WBC    Outcome: Progressing     Problem: SAFETY ADULT  Goal: Patient will remain free of falls  Description: INTERVENTIONS:  - Educate patient/family on patient safety including physical limitations  - Instruct patient to call for assistance with activity   - Consult OT/PT to assist with strengthening/mobility   - Keep Call bell within reach  - Keep bed low and locked with side rails adjusted as appropriate  - Keep care items and personal belongings within reach  - Initiate and maintain comfort rounds  - Make Fall Risk Sign visible to staff  - Offer Toileting every 2 Hours, in advance of need  - Apply yellow socks and bracelet for high fall risk patients  - Consider moving patient to room near nurses station  Outcome: Progressing  Goal: Maintain or return to baseline ADL function  Description: INTERVENTIONS:  -  Assess patient's ability to carry out ADLs; assess patient's baseline for ADL function and identify physical deficits which impact ability to perform ADLs (bathing, care of mouth/teeth, toileting, grooming, dressing, etc.)  - Assess/evaluate cause of self-care deficits   - Assess range of motion  - Assess patient's mobility; develop plan if impaired  - Assess patient's need for assistive devices and provide as appropriate  - Encourage maximum independence but intervene and supervise when necessary  - Involve family in performance of ADLs  - Assess for home care needs following discharge   - Consider OT consult to assist with ADL evaluation and planning for discharge  - Provide patient education as appropriate  Outcome: Progressing  Goal: Maintains/Returns to pre admission functional level  Description: INTERVENTIONS:  - Perform AM-PAC 6 Click Basic Mobility/ Daily Activity assessment daily.  - Set and communicate daily mobility goal to care team and patient/family/caregiver. - Collaborate with rehabilitation services on mobility goals if consulted  - Perform Range of Motion 3 times a day. - Reposition patient every 2 hours.   - Dangle patient 3 times a day  - Stand patient 3 times a day  - Ambulate patient 3 times a day  - Out of bed to chair 3 times a day   - Out of bed for meals 3 times a day  - Out of bed for toileting  - Record patient progress and toleration of activity level   Outcome: Progressing

## 2023-12-08 NOTE — ASSESSMENT & PLAN NOTE
Follows with Dr. Bertis Phalen   History of R fem-endarterectomy, R SFA, popliteal and AT angioplasty on 3/23/23.    DAPT on hold right now, resume when ok per GI

## 2023-12-08 NOTE — ANESTHESIA PREPROCEDURE EVALUATION
HPI   "23-year-old white male new to me with past medical history of chronic kidney disease stage III-IV with an acute kidney injury, coronary artery disease, severe aortic stenosis status post TAVR on Plavix with last dose on Roland, December 3, 2023, carotid stenosis, chronic kidney disease, history of colon polyps, history of colonic ischemia, diabetes type 2, GERD, Batista's, exocrine pancreatic insufficiency, history of kidney stones, hyperlipidemia, hypertension, peripheral artery disease and right hemicolectomy secondary to perforated appendicitis who presents to the emergency room with symptomatic anemia. He had reported some dizziness as well as some dark stools as well as red blood in his stool that had started yesterday. He is on Plavix for history of TAVR. His hemoglobin was found to be 7.4 in the emergency room and was 11.8 several months ago.   Digital rectal exam in the emergency room done by the attending who I spoke with did reveal red blood. "    Procedure:  COLONOSCOPY    Relevant Problems   CARDIO   (+) CAD (coronary artery disease)   (+) Hyperlipidemia   (+) Primary hypertension   (+) Progressive angina (HCC)   (+) Sinus bradycardia      ENDO   (+) Controlled type 2 diabetes mellitus with stage 4 chronic kidney disease, without long-term current use of insulin (HCC)   (+) Secondary hyperparathyroidism of renal origin ([address])      GI/HEPATIC   (+) GERD (gastroesophageal reflux disease)   (+) GI bleed      /RENAL   (+) Acute kidney injury superimposed on chronic kidney disease    (+) Acute renal failure superimposed on stage 4 chronic kidney disease (HCC)   (+) Benign hypertension with chronic kidney disease, stage III (HCC)   (+) Benign hypertension with chronic kidney disease, stage IV (HCC)   (+) CKD (chronic kidney disease) stage 4, GFR 15-29 ml/min (HCC)   (+) Renal cyst, left      HEMATOLOGY   (+) Acute blood loss anemia   (+) Anemia due to stage 4 chronic kidney disease    (+) Iron deficiency anemia      NEURO/PSYCH   (+) Atherosclerosis of native arteries of extremities with intermittent claudication, bilateral legs (HCC)   (+) Progressive angina (HCC)   (+) Tension headache       Physical Exam    Airway    Mallampati score: II  TM Distance: >3 FB  Neck ROM: full     Dental       Cardiovascular  No weak pulses    Pulmonary   No stridor    Other Findings        Anesthesia Plan  ASA Score- 3     Anesthesia Type- IV sedation with anesthesia with ASA Monitors. Additional Monitors:     Airway Plan:            Plan Factors-    Chart reviewed. EKG reviewed. Existing labs reviewed. Patient summary reviewed. Induction- intravenous. Postoperative Plan-     Informed Consent- Anesthetic plan and risks discussed with patient. I personally reviewed this patient with the CRNA. Discussed and agreed on the Anesthesia Plan with the CRNA. Dylon Anderson

## 2023-12-08 NOTE — ASSESSMENT & PLAN NOTE
Lab Results   Component Value Date    EGFR 30 12/07/2023    EGFR 28 12/06/2023    EGFR 21 12/05/2023    CREATININE 2.01 (H) 12/07/2023    CREATININE 2.15 (H) 12/06/2023    CREATININE 2.64 (H) 12/05/2023     Follows with Dr. Danyelle Graves creatinine of 2.2-2.3 presented with creatinine of 2.9 suspect secondary to GI bleed and volume depletion. Resolved with IV fluid. Currently lisinopril and diuretics on hold.   Creat 2.11

## 2023-12-09 LAB
ANION GAP SERPL CALCULATED.3IONS-SCNC: 5 MMOL/L
BUN SERPL-MCNC: 26 MG/DL (ref 5–25)
CALCIUM SERPL-MCNC: 8.5 MG/DL (ref 8.4–10.2)
CHLORIDE SERPL-SCNC: 108 MMOL/L (ref 96–108)
CO2 SERPL-SCNC: 26 MMOL/L (ref 21–32)
CREAT SERPL-MCNC: 2.11 MG/DL (ref 0.6–1.3)
EST. AVERAGE GLUCOSE BLD GHB EST-MCNC: 128 MG/DL
GFR SERPL CREATININE-BSD FRML MDRD: 28 ML/MIN/1.73SQ M
GLUCOSE SERPL-MCNC: 100 MG/DL (ref 65–140)
GLUCOSE SERPL-MCNC: 130 MG/DL (ref 65–140)
GLUCOSE SERPL-MCNC: 197 MG/DL (ref 65–140)
GLUCOSE SERPL-MCNC: 99 MG/DL (ref 65–140)
HBA1C MFR BLD: 6.1 %
HCT VFR BLD AUTO: 24.1 % (ref 36.5–49.3)
HCT VFR BLD AUTO: 24.9 % (ref 36.5–49.3)
HGB BLD-MCNC: 7.9 G/DL (ref 12–17)
HGB BLD-MCNC: 7.9 G/DL (ref 12–17)
POTASSIUM SERPL-SCNC: 3.9 MMOL/L (ref 3.5–5.3)
SODIUM SERPL-SCNC: 139 MMOL/L (ref 135–147)

## 2023-12-09 PROCEDURE — 82948 REAGENT STRIP/BLOOD GLUCOSE: CPT

## 2023-12-09 PROCEDURE — C9113 INJ PANTOPRAZOLE SODIUM, VIA: HCPCS | Performed by: PHYSICIAN ASSISTANT

## 2023-12-09 PROCEDURE — 99232 SBSQ HOSP IP/OBS MODERATE 35: CPT | Performed by: INTERNAL MEDICINE

## 2023-12-09 PROCEDURE — 83036 HEMOGLOBIN GLYCOSYLATED A1C: CPT | Performed by: PHYSICIAN ASSISTANT

## 2023-12-09 PROCEDURE — 80048 BASIC METABOLIC PNL TOTAL CA: CPT | Performed by: PHYSICIAN ASSISTANT

## 2023-12-09 PROCEDURE — 85018 HEMOGLOBIN: CPT | Performed by: PHYSICIAN ASSISTANT

## 2023-12-09 PROCEDURE — 85014 HEMATOCRIT: CPT | Performed by: PHYSICIAN ASSISTANT

## 2023-12-09 RX ADMIN — MULTIPLE VITAMINS W/ MINERALS TAB 1 TABLET: TAB ORAL at 08:55

## 2023-12-09 RX ADMIN — OXYCODONE HYDROCHLORIDE AND ACETAMINOPHEN 250 MG: 500 TABLET ORAL at 08:56

## 2023-12-09 RX ADMIN — ASPIRIN 81 MG: 81 TABLET ORAL at 08:56

## 2023-12-09 RX ADMIN — NICOTINE 1 PATCH: 21 PATCH, EXTENDED RELEASE TRANSDERMAL at 08:58

## 2023-12-09 RX ADMIN — AMLODIPINE BESYLATE 10 MG: 10 TABLET ORAL at 08:55

## 2023-12-09 RX ADMIN — PANCRELIPASE 24000 UNITS: 24000; 76000; 120000 CAPSULE, DELAYED RELEASE PELLETS ORAL at 17:45

## 2023-12-09 RX ADMIN — METOPROLOL SUCCINATE 12.5 MG: 25 TABLET, EXTENDED RELEASE ORAL at 08:56

## 2023-12-09 RX ADMIN — CLOPIDOGREL BISULFATE 75 MG: 75 TABLET ORAL at 08:55

## 2023-12-09 RX ADMIN — CALCITRIOL 0.25 MCG: 0.25 CAPSULE, LIQUID FILLED ORAL at 08:55

## 2023-12-09 RX ADMIN — PANCRELIPASE 24000 UNITS: 24000; 76000; 120000 CAPSULE, DELAYED RELEASE PELLETS ORAL at 08:55

## 2023-12-09 RX ADMIN — PANCRELIPASE 24000 UNITS: 24000; 76000; 120000 CAPSULE, DELAYED RELEASE PELLETS ORAL at 12:35

## 2023-12-09 RX ADMIN — ATORVASTATIN CALCIUM 80 MG: 40 TABLET, FILM COATED ORAL at 21:08

## 2023-12-09 RX ADMIN — INSULIN LISPRO 1 UNITS: 100 INJECTION, SOLUTION INTRAVENOUS; SUBCUTANEOUS at 12:35

## 2023-12-09 RX ADMIN — ALLOPURINOL 300 MG: 300 TABLET ORAL at 08:56

## 2023-12-09 RX ADMIN — PANTOPRAZOLE SODIUM 40 MG: 40 INJECTION, POWDER, FOR SOLUTION INTRAVENOUS at 08:58

## 2023-12-09 RX ADMIN — PANTOPRAZOLE SODIUM 40 MG: 40 INJECTION, POWDER, FOR SOLUTION INTRAVENOUS at 21:10

## 2023-12-09 NOTE — ASSESSMENT & PLAN NOTE
No recent history of drug-eluting stent  Holding ASA  Continue BB/statin  Follow up Dr. Princess Padilla

## 2023-12-09 NOTE — PLAN OF CARE
Problem: Prexisting or High Potential for Compromised Skin Integrity  Goal: Skin integrity is maintained or improved  Description: INTERVENTIONS:  - Identify patients at risk for skin breakdown  - Assess and monitor skin integrity  - Assess and monitor nutrition and hydration status  - Monitor labs   - Assess for incontinence   - Turn and reposition patient  - Assist with mobility/ambulation  - Relieve pressure over bony prominences  - Avoid friction and shearing  - Provide appropriate hygiene as needed including keeping skin clean and dry  - Evaluate need for skin moisturizer/barrier cream  - Collaborate with interdisciplinary team   - Patient/family teaching  - Consider wound care consult   Outcome: Progressing     Problem: PAIN - ADULT  Goal: Verbalizes/displays adequate comfort level or baseline comfort level  Description: Interventions:  - Encourage patient to monitor pain and request assistance  - Assess pain using appropriate pain scale  - Administer analgesics based on type and severity of pain and evaluate response  - Implement non-pharmacological measures as appropriate and evaluate response  - Consider cultural and social influences on pain and pain management  - Notify physician/advanced practitioner if interventions unsuccessful or patient reports new pain  Outcome: Progressing     Problem: INFECTION - ADULT  Goal: Absence or prevention of progression during hospitalization  Description: INTERVENTIONS:  - Assess and monitor for signs and symptoms of infection  - Monitor lab/diagnostic results  - Monitor all insertion sites, i.e. indwelling lines, tubes, and drains  - Monitor endotracheal if appropriate and nasal secretions for changes in amount and color  - [place] appropriate cooling/warming therapies per order  - Administer medications as ordered  - Instruct and encourage patient and family to use good hand hygiene technique  - Identify and instruct in appropriate isolation precautions for identified infection/condition  Outcome: Progressing  Goal: Absence of fever/infection during neutropenic period  Description: INTERVENTIONS:  - Monitor WBC    Outcome: Progressing     Problem: SAFETY ADULT  Goal: Patient will remain free of falls  Description: INTERVENTIONS:  - Educate patient/family on patient safety including physical limitations  - Instruct patient to call for assistance with activity   - Consult OT/PT to assist with strengthening/mobility   - Keep Call bell within reach  - Keep bed low and locked with side rails adjusted as appropriate  - Keep care items and personal belongings within reach  - Initiate and maintain comfort rounds  - Make Fall Risk Sign visible to staff  - Offer Toileting every  Hours, in advance of need  - Initiate/Maintain alarm  - Obtain necessary fall risk management equipment:   - Apply yellow socks and bracelet for high fall risk patients  - Consider moving patient to room near nurses station  Outcome: Progressing  Goal: Maintain or return to baseline ADL function  Description: INTERVENTIONS:  -  Assess patient's ability to carry out ADLs; assess patient's baseline for ADL function and identify physical deficits which impact ability to perform ADLs (bathing, care of mouth/teeth, toileting, grooming, dressing, etc.)  - Assess/evaluate cause of self-care deficits   - Assess range of motion  - Assess patient's mobility; develop plan if impaired  - Assess patient's need for assistive devices and provide as appropriate  - Encourage maximum independence but intervene and supervise when necessary  - Involve family in performance of ADLs  - Assess for home care needs following discharge   - Consider OT consult to assist with ADL evaluation and planning for discharge  - Provide patient education as appropriate  Outcome: Progressing  Goal: Maintains/Returns to pre admission functional level  Description: INTERVENTIONS:  - Perform AM-PAC 6 Click Basic Mobility/ Daily Activity assessment daily.  - Set and communicate daily mobility goal to care team and patient/family/caregiver. - Collaborate with rehabilitation services on mobility goals if consulted  - Perform Range of Motion  times a day. - Reposition patient every  hours. - Dangle patient  times a day  - Stand patient  times a day  - Ambulate patient  times a day  - Out of bed to chair  times a day   - Out of bed for meals  times a day  - Out of bed for toileting  - Record patient progress and toleration of activity level   Outcome: Progressing     Problem: DISCHARGE PLANNING  Goal: Discharge to home or other facility with appropriate resources  Description: INTERVENTIONS:  - Identify barriers to discharge w/patient and caregiver  - Arrange for needed discharge resources and transportation as appropriate  - Identify discharge learning needs (meds, wound care, etc.)  - Arrange for interpretive services to assist at discharge as needed  - Refer to Case Management Department for coordinating discharge planning if the patient needs post-hospital services based on physician/advanced practitioner order or complex needs related to functional status, cognitive ability, or social support system  Outcome: Progressing     Problem: Knowledge Deficit  Goal: Patient/family/caregiver demonstrates understanding of disease process, treatment plan, medications, and discharge instructions  Description: Complete learning assessment and assess knowledge base.   Interventions:  - Provide teaching at level of understanding  - Provide teaching via preferred learning methods  Outcome: Progressing

## 2023-12-09 NOTE — ASSESSMENT & PLAN NOTE
Blood pressure mildly elevated   Continue amlodipine, Toprol-XL with hold parameters  BP is 124/41, would not restart any other meds

## 2023-12-09 NOTE — ASSESSMENT & PLAN NOTE
Patient presented with new onset rectal bleeding, reportedly both melena and hematochezia --now resolving  EGD 12/5/2023 showed ulcer which was clipped  C scope showed diverticulosis only   Discussed with patient and likely DC tomorrow provided no bleeding after DAPT restarted.

## 2023-12-09 NOTE — ASSESSMENT & PLAN NOTE
Lab Results   Component Value Date    HGBA1C 6.1 (H) 12/09/2023     Recent Labs     12/08/23  1708 12/08/23  2035 12/09/23  0804 12/09/23  1203   POCGLU 129 237* 100 197*     Blood Sugar Average: Last 72 hrs:  (P) [id number]  A1c due to be rechecked, can add to am labs  SSI  BS at goal 140-180

## 2023-12-09 NOTE — PROGRESS NOTES
[id number]  Progress Note  Name: Gema Gibbons I  MRN: [id number]  Unit/Bed#: S -01 I Date of Admission: 12/4/2023   Date of Service: 12/9/2023 I Hospital Day: 5    Assessment/Plan   Acute renal failure superimposed on stage 4 chronic kidney disease [facility])  Assessment & Plan  Lab Results   Component Value Date    EGFR 30 12/07/2023    EGFR 28 12/06/2023    EGFR 21 12/05/2023    CREATININE 2.01 (H) 12/07/2023    CREATININE 2.15 (H) 12/06/2023    CREATININE 2.64 (H) 12/05/2023     Follows with Dr. Adeline Brantley creatinine of 2.2-2.3 presented with creatinine of 2.9 suspect secondary to GI bleed and volume depletion. Resolved with IV fluid. Currently lisinopril and diuretics on hold. Creat 2.11    Acute blood loss anemia  Assessment & Plan  Patient with evidence of chronic anemia who presented with rectal bleeding and drop in hgb to 6.7, prompting PRBC transfusion. Currently improved to 8.8  Recheck in am - if stable okay to DC     Chronic diastolic CHF (congestive heart failure) (HCC)  Assessment & Plan    Appears euvolemic.   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)  Assessment & Plan  Lab Results   Component Value Date    HGBA1C 6.1 (H) 12/09/2023     Recent Labs     12/08/23  1708 12/08/23  2035 12/09/23  0804 12/09/23  1203   POCGLU 129 237* 100 197*     Blood Sugar Average: Last 72 hrs:  (P) [id number]  A1c due to be rechecked, can add to am labs  SSI  BS at goal 140-180      CAD (coronary artery disease)  Assessment & Plan  No recent history of drug-eluting stent  Holding ASA  Continue BB/statin  Follow up Dr. Roberta Ross    Primary hypertension  Assessment & Plan  Blood pressure mildly elevated   Continue amlodipine, Toprol-XL with hold parameters  BP is 124/41, would not restart any other meds    PVD (peripheral vascular disease) ([address])  Assessment & Plan  Follows with Dr. Vidhya Rosas   History of R fem-endarterectomy, R SFA, popliteal and AT angioplasty on 3/23/23. Restarted plavix and asa today     * GI bleed  Assessment & Plan  Patient presented with new onset rectal bleeding, reportedly both melena and hematochezia --now resolving  EGD 2023 showed ulcer which was clipped  C scope showed diverticulosis only   Discussed with patient and likely DC tomorrow provided no bleeding after DAPT restarted. VTE Pharmacologic Prophylaxis: VTE Score: 3 Moderate Risk (Score 3-4) - Pharmacological DVT Prophylaxis Ordered: heparin. Patient Centered Rounds: I performed bedside rounds with nursing staff today. Discussions with Specialists or Other Care Team Provider: Discussed with GI - cleared from their standpoint. Education and Discussions with Family / Patient:  called David Huston - . Updated him     Total Time Spent on Date of Encounter in care of patient: 30 mins. This time was spent on one or more of the following: performing physical exam; counseling and coordination of care; obtaining or reviewing history; documenting in the medical record; reviewing/ordering tests, medications or procedures; communicating with other healthcare professionals and discussing with patient's family/caregivers. Current Length of Stay: 5 day(s)  Current Patient Status: Inpatient   Certification Statement: The patient will continue to require additional inpatient hospital stay due to monitor   Discharge Plan: Anticipate discharge tomorrow to home. Code Status: Level 1 - Full Code    Subjective:   Patient seen and examined  No new complaints. Objective:     Vitals:   Temp (24hrs), Av.5 °F (36.4 °C), Min:96.8 °F (36 °C), Max:97.9 °F (36.6 °C)    Temp:  [96.8 °F (36 °C)-97.9 °F (36.6 °C)] 97.9 °F (36.6 °C)  HR:  [48-57] 50  Resp:  [16-18] 18  BP: (109-186)/(40-89) 124/41  SpO2:  [97 %-100 %] 98 %  Body mass index is 24.17 kg/m².      Input and Output Summary (last 24 hours): Intake/Output Summary (Last 24 hours) at 12/9/2023 1521  Last data filed at 12/9/2023 1300  Gross per 24 hour   Intake 660 ml   Output --   Net 660 ml       Physical Exam:   Physical Exam  Constitutional:       General: He is not in acute distress. Appearance: He is not ill-appearing, toxic-appearing or diaphoretic. HENT:      Head: Normocephalic. Mouth/Throat:      Mouth: Mucous membranes are moist.   Eyes:      Pupils: Pupils are equal, round, and reactive to light. Cardiovascular:      Rate and Rhythm: Normal rate. Pulmonary:      Effort: No respiratory distress. Breath sounds: No stridor. No wheezing, rhonchi or rales. Chest:      Chest wall: No tenderness. Abdominal:      General: There is no distension. Palpations: There is no mass. Tenderness: There is no abdominal tenderness. There is no right CVA tenderness, left CVA tenderness, guarding or rebound. Hernia: No hernia is present. Musculoskeletal:         General: No swelling, tenderness, deformity or signs of injury. Right lower leg: No edema. Left lower leg: No edema. Skin:     Capillary Refill: Capillary refill takes less than 2 seconds. Coloration: Skin is pale. Skin is not jaundiced. Findings: No bruising, erythema, lesion or rash. Neurological:      General: No focal deficit present. Mental Status: He is alert. Cranial Nerves: No cranial nerve deficit. Sensory: No sensory deficit. Motor: No weakness.       Coordination: Coordination normal.      Gait: Gait normal.      Deep Tendon Reflexes: Reflexes normal.   Psychiatric:         Mood and Affect: Mood normal.          Additional Data:     Labs:  Results from last 7 days   Lab Units 12/09/23  0553 12/07/23  1624 12/07/23  0444 12/04/23  1750 12/04/23  1049   WBC Thousand/uL  --   --  4.31   < > 7.15   HEMOGLOBIN g/dL 7.9*   < > 6.7*   < > 7.4*   HEMATOCRIT % 24.9*   < > 21.1*   < > 23.8*   PLATELETS Thousands/uL  --   --  123*   < > 139*   NEUTROS PCT %  --   --   --   --  85*   LYMPHS PCT %  --   --   --   --  10*   MONOS PCT %  --   --   --   --  4   EOS PCT %  --   --   --   --  1    < > = values in this interval not displayed. Results from last 7 days   Lab Units 12/09/23  0528 12/07/23  0444 12/06/23  0548   SODIUM mmol/L 139   < > 141   POTASSIUM mmol/L 3.9   < > 4.0   CHLORIDE mmol/L 108   < > 112*   CO2 mmol/L 26   < > 26   BUN mg/dL 26*   < > 47*   CREATININE mg/dL 2.11*   < > 2.15*   ANION GAP mmol/L 5   < > 3   CALCIUM mg/dL 8.5   < > 8.6   ALBUMIN g/dL  --   --  3.1*   TOTAL BILIRUBIN mg/dL  --   --  0.42   ALK PHOS U/L  --   --  51   ALT U/L  --   --  5*   AST U/L  --   --  11*   GLUCOSE RANDOM mg/dL 99   < > 106    < > = values in this interval not displayed. Results from last 7 days   Lab Units 12/04/23  1049   INR  1.03     Results from last 7 days   Lab Units 12/09/23  1203 12/09/23  0804 12/08/23  2035 12/08/23  1708 12/08/23  1128 12/08/23  0801 12/07/23  1639 12/07/23  1108 12/07/23  0840 12/06/23  2106 12/06/23  1635 12/06/23  1121   POC GLUCOSE mg/dl 197* 100 237* 129 112 94 117 122 155* 198* 130 238*     Results from last 7 days   Lab Units 12/09/23  0528   HEMOGLOBIN A1C % 6.1*           Lines/Drains:  Invasive Devices       Peripheral Intravenous Line  Duration             Peripheral IV 12/09/23 Right;Ventral (anterior) Forearm <1 day                          Imaging: No pertinent imaging reviewed.     Recent Cultures (last 7 days):         Last 24 Hours Medication List:   Current Facility-Administered Medications   Medication Dose Route Frequency Provider Last Rate    acetaminophen  650 mg Oral Q6H PRN Antonia Abdi PA-C      allopurinol  300 mg Oral Daily Zane Cunha MD      amLODIPine  10 mg Oral Daily Noemí Rowels, MD      ascorbic acid  250 mg Oral Daily Antonia Abdi PA-C      aspirin  81 mg Oral Daily Yin Cardoso PA-C      atorvastatin  80 mg Oral  Juliocesar Perico Sena MD      calcitriol  0.25 mcg Oral Daily Ashlie Mendoza MD      clopidogrel  75 mg Oral Daily Carlota Lott PA-C      hydrALAZINE  5 mg Intravenous Q6H PRN Chele Kearney MD      insulin lispro  1-5 Units Subcutaneous TID AC Ashlie Mendoza MD      melatonin  6 mg Oral HS PRN Antonia Abdi PA-C      metoprolol succinate  12.5 mg Oral Daily Antonia Abdi PA-C      multivitamin-minerals  1 tablet Oral Daily Antonia Abdi PA-C      nicotine  1 patch Transdermal Daily Ashlie Mendoza MD      pancrelipase (Lip-Prot-Amyl)  24,000 Units Oral TID With Meals ZHANNA Dunn      pantoprazole  40 mg Intravenous Q12H [address] Antonia Abdi PA-C      polyethylene glycol  4,000 mL Oral See Admin Instructions Carlota Lott PA-C          Today, Patient Was Seen By: Ana Arce MD    **Please Note: This note may have been constructed using a voice recognition system. **

## 2023-12-09 NOTE — PLAN OF CARE
Problem: Prexisting or High Potential for Compromised Skin Integrity  Goal: Skin integrity is maintained or improved  Description: INTERVENTIONS:  - Identify patients at risk for skin breakdown  - Assess and monitor skin integrity  - Assess and monitor nutrition and hydration status  - Monitor labs   - Assess for incontinence   - Turn and reposition patient  - Assist with mobility/ambulation  - Relieve pressure over bony prominences  - Avoid friction and shearing  - Provide appropriate hygiene as needed including keeping skin clean and dry  - Evaluate need for skin moisturizer/barrier cream  - Collaborate with interdisciplinary team   - Patient/family teaching  - Consider wound care consult   Outcome: Progressing     Problem: PAIN - ADULT  Goal: Verbalizes/displays adequate comfort level or baseline comfort level  Description: Interventions:  - Encourage patient to monitor pain and request assistance  - Assess pain using appropriate pain scale  - Administer analgesics based on type and severity of pain and evaluate response  - Implement non-pharmacological measures as appropriate and evaluate response  - Consider cultural and social influences on pain and pain management  - Notify physician/advanced practitioner if interventions unsuccessful or patient reports new pain  Outcome: Progressing     Problem: INFECTION - ADULT  Goal: Absence or prevention of progression during hospitalization  Description: INTERVENTIONS:  - Assess and monitor for signs and symptoms of infection  - Monitor lab/diagnostic results  - Monitor all insertion sites, i.e. indwelling lines, tubes, and drains  - Monitor endotracheal if appropriate and nasal secretions for changes in amount and color  - [place] appropriate cooling/warming therapies per order  - Administer medications as ordered  - Instruct and encourage patient and family to use good hand hygiene technique  - Identify and instruct in appropriate isolation precautions for identified infection/condition  Outcome: Progressing  Goal: Absence of fever/infection during neutropenic period  Description: INTERVENTIONS:  - Monitor WBC    Outcome: Progressing     Problem: SAFETY ADULT  Goal: Patient will remain free of falls  Description: INTERVENTIONS:  - Educate patient/family on patient safety including physical limitations  - Instruct patient to call for assistance with activity   - Consult OT/PT to assist with strengthening/mobility   - Keep Call bell within reach  - Keep bed low and locked with side rails adjusted as appropriate  - Keep care items and personal belongings within reach  - Initiate and maintain comfort rounds  - Make Fall Risk Sign visible to staff  - Offer Toileting every 2 Hours, in advance of need  - Apply yellow socks and bracelet for high fall risk patients  - Consider moving patient to room near nurses station  Outcome: Progressing  Goal: Maintain or return to baseline ADL function  Description: INTERVENTIONS:  -  Assess patient's ability to carry out ADLs; assess patient's baseline for ADL function and identify physical deficits which impact ability to perform ADLs (bathing, care of mouth/teeth, toileting, grooming, dressing, etc.)  - Assess/evaluate cause of self-care deficits   - Assess range of motion  - Assess patient's mobility; develop plan if impaired  - Assess patient's need for assistive devices and provide as appropriate  - Encourage maximum independence but intervene and supervise when necessary  - Involve family in performance of ADLs  - Assess for home care needs following discharge   - Consider OT consult to assist with ADL evaluation and planning for discharge  - Provide patient education as appropriate  Outcome: Progressing  Goal: Maintains/Returns to pre admission functional level  Description: INTERVENTIONS:  - Perform AM-PAC 6 Click Basic Mobility/ Daily Activity assessment daily.  - Set and communicate daily mobility goal to care team and patient/family/caregiver. - Collaborate with rehabilitation services on mobility goals if consulted  - Perform Range of Motion 3 times a day. - Reposition patient every 2 hours. - Dangle patient 3 times a day  - Stand patient 3 times a day  - Ambulate patient 3 times a day  - Out of bed to chair 3 times a day   - Out of bed for meals 3 times a day  - Out of bed for toileting  - Record patient progress and toleration of activity level   Outcome: Progressing     Problem: Knowledge Deficit  Goal: Patient/family/caregiver demonstrates understanding of disease process, treatment plan, medications, and discharge instructions  Description: Complete learning assessment and assess knowledge base.   Interventions:  - Provide teaching at level of understanding  - Provide teaching via preferred learning methods  Outcome: Progressing

## 2023-12-09 NOTE — ASSESSMENT & PLAN NOTE
Patient with evidence of chronic anemia who presented with rectal bleeding and drop in hgb to 6.7, prompting PRBC transfusion.  Currently improved to 8.8  Recheck in am - if stable okay to DC

## 2023-12-09 NOTE — ASSESSMENT & PLAN NOTE
Follows with Dr. Criss Jason   History of R fem-endarterectomy, R SFA, popliteal and AT angioplasty on 3/23/23.    Restarted plavix and asa today

## 2023-12-10 VITALS
HEART RATE: 54 BPM | OXYGEN SATURATION: 95 % | RESPIRATION RATE: 18 BRPM | BODY MASS INDEX: 24.45 KG/M2 | DIASTOLIC BLOOD PRESSURE: 46 MMHG | TEMPERATURE: 98.1 F | SYSTOLIC BLOOD PRESSURE: 129 MMHG | WEIGHT: 170.8 LBS | HEIGHT: 70 IN

## 2023-12-10 LAB
ALBUMIN SERPL BCP-MCNC: 3.1 G/DL (ref 3.5–5)
ALP SERPL-CCNC: 55 U/L (ref 34–104)
ALT SERPL W P-5'-P-CCNC: 7 U/L (ref 7–52)
ANION GAP SERPL CALCULATED.3IONS-SCNC: 6 MMOL/L
AST SERPL W P-5'-P-CCNC: 13 U/L (ref 13–39)
BASOPHILS # BLD AUTO: 0.01 THOUSANDS/ÂΜL (ref 0–0.1)
BASOPHILS NFR BLD AUTO: 0 % (ref 0–1)
BILIRUB SERPL-MCNC: 0.34 MG/DL (ref 0.2–1)
BUN SERPL-MCNC: 29 MG/DL (ref 5–25)
CALCIUM ALBUM COR SERPL-MCNC: 9.3 MG/DL (ref 8.3–10.1)
CALCIUM SERPL-MCNC: 8.6 MG/DL (ref 8.4–10.2)
CHLORIDE SERPL-SCNC: 110 MMOL/L (ref 96–108)
CO2 SERPL-SCNC: 25 MMOL/L (ref 21–32)
CREAT SERPL-MCNC: 2.05 MG/DL (ref 0.6–1.3)
EOSINOPHIL # BLD AUTO: 0.41 THOUSAND/ÂΜL (ref 0–0.61)
EOSINOPHIL NFR BLD AUTO: 7 % (ref 0–6)
ERYTHROCYTE [DISTWIDTH] IN BLOOD BY AUTOMATED COUNT: 15.9 % (ref 11.6–15.1)
GFR SERPL CREATININE-BSD FRML MDRD: 29 ML/MIN/1.73SQ M
GLUCOSE SERPL-MCNC: 125 MG/DL (ref 65–140)
GLUCOSE SERPL-MCNC: 127 MG/DL (ref 65–140)
GLUCOSE SERPL-MCNC: 130 MG/DL (ref 65–140)
GLUCOSE SERPL-MCNC: 146 MG/DL (ref 65–140)
HCT VFR BLD AUTO: 25.8 % (ref 36.5–49.3)
HGB BLD-MCNC: 8.3 G/DL (ref 12–17)
IMM GRANULOCYTES # BLD AUTO: 0.02 THOUSAND/UL (ref 0–0.2)
IMM GRANULOCYTES NFR BLD AUTO: 0 % (ref 0–2)
LYMPHOCYTES # BLD AUTO: 0.54 THOUSANDS/ÂΜL (ref 0.6–4.47)
LYMPHOCYTES NFR BLD AUTO: 10 % (ref 14–44)
MCH RBC QN AUTO: 30.9 PG (ref 26.8–34.3)
MCHC RBC AUTO-ENTMCNC: 32.2 G/DL (ref 31.4–37.4)
MCV RBC AUTO: 96 FL (ref 82–98)
MONOCYTES # BLD AUTO: 0.32 THOUSAND/ÂΜL (ref 0.17–1.22)
MONOCYTES NFR BLD AUTO: 6 % (ref 4–12)
NEUTROPHILS # BLD AUTO: 4.24 THOUSANDS/ÂΜL (ref 1.85–7.62)
NEUTS SEG NFR BLD AUTO: 77 % (ref 43–75)
NRBC BLD AUTO-RTO: 0 /100 WBCS
PLATELET # BLD AUTO: 134 THOUSANDS/UL (ref 149–390)
PMV BLD AUTO: 9.6 FL (ref 8.9–12.7)
POTASSIUM SERPL-SCNC: 4.1 MMOL/L (ref 3.5–5.3)
PROT SERPL-MCNC: 5.4 G/DL (ref 6.4–8.4)
RBC # BLD AUTO: 2.69 MILLION/UL (ref 3.88–5.62)
SODIUM SERPL-SCNC: 141 MMOL/L (ref 135–147)
WBC # BLD AUTO: 5.54 THOUSAND/UL (ref 4.31–10.16)

## 2023-12-10 PROCEDURE — 85025 COMPLETE CBC W/AUTO DIFF WBC: CPT | Performed by: INTERNAL MEDICINE

## 2023-12-10 PROCEDURE — C9113 INJ PANTOPRAZOLE SODIUM, VIA: HCPCS | Performed by: PHYSICIAN ASSISTANT

## 2023-12-10 PROCEDURE — 82948 REAGENT STRIP/BLOOD GLUCOSE: CPT

## 2023-12-10 PROCEDURE — 80053 COMPREHEN METABOLIC PANEL: CPT | Performed by: INTERNAL MEDICINE

## 2023-12-10 PROCEDURE — 99239 HOSP IP/OBS DSCHRG MGMT >30: CPT | Performed by: INTERNAL MEDICINE

## 2023-12-10 RX ORDER — PANTOPRAZOLE SODIUM 40 MG/1
40 TABLET, DELAYED RELEASE ORAL 2 TIMES DAILY
Qty: 90 TABLET | Refills: 0 | Status: ON HOLD | OUTPATIENT
Start: 2023-12-10

## 2023-12-10 RX ADMIN — CALCITRIOL 0.25 MCG: 0.25 CAPSULE, LIQUID FILLED ORAL at 08:28

## 2023-12-10 RX ADMIN — ALLOPURINOL 300 MG: 300 TABLET ORAL at 08:26

## 2023-12-10 RX ADMIN — PANCRELIPASE 24000 UNITS: 24000; 76000; 120000 CAPSULE, DELAYED RELEASE PELLETS ORAL at 08:26

## 2023-12-10 RX ADMIN — PANTOPRAZOLE SODIUM 40 MG: 40 INJECTION, POWDER, FOR SOLUTION INTRAVENOUS at 08:29

## 2023-12-10 RX ADMIN — OXYCODONE HYDROCHLORIDE AND ACETAMINOPHEN 250 MG: 500 TABLET ORAL at 08:26

## 2023-12-10 RX ADMIN — METOPROLOL SUCCINATE 12.5 MG: 25 TABLET, EXTENDED RELEASE ORAL at 08:26

## 2023-12-10 RX ADMIN — MULTIPLE VITAMINS W/ MINERALS TAB 1 TABLET: TAB ORAL at 08:26

## 2023-12-10 RX ADMIN — PANCRELIPASE 24000 UNITS: 24000; 76000; 120000 CAPSULE, DELAYED RELEASE PELLETS ORAL at 13:38

## 2023-12-10 RX ADMIN — AMLODIPINE BESYLATE 10 MG: 10 TABLET ORAL at 08:26

## 2023-12-10 RX ADMIN — ASPIRIN 81 MG: 81 TABLET ORAL at 08:26

## 2023-12-10 RX ADMIN — NICOTINE 1 PATCH: 21 PATCH, EXTENDED RELEASE TRANSDERMAL at 08:29

## 2023-12-10 RX ADMIN — CLOPIDOGREL BISULFATE 75 MG: 75 TABLET ORAL at 08:26

## 2023-12-10 NOTE — PLAN OF CARE
Problem: Prexisting or High Potential for Compromised Skin Integrity  Goal: Skin integrity is maintained or improved  Description: INTERVENTIONS:  - Identify patients at risk for skin breakdown  - Assess and monitor skin integrity  - Assess and monitor nutrition and hydration status  - Monitor labs   - Assess for incontinence   - Turn and reposition patient  - Assist with mobility/ambulation  - Relieve pressure over bony prominences  - Avoid friction and shearing  - Provide appropriate hygiene as needed including keeping skin clean and dry  - Evaluate need for skin moisturizer/barrier cream  - Collaborate with interdisciplinary team   - Patient/family teaching  - Consider wound care consult   Outcome: Progressing     Problem: PAIN - ADULT  Goal: Verbalizes/displays adequate comfort level or baseline comfort level  Description: Interventions:  - Encourage patient to monitor pain and request assistance  - Assess pain using appropriate pain scale  - Administer analgesics based on type and severity of pain and evaluate response  - Implement non-pharmacological measures as appropriate and evaluate response  - Consider cultural and social influences on pain and pain management  - Notify physician/advanced practitioner if interventions unsuccessful or patient reports new pain  Outcome: Progressing     Problem: INFECTION - ADULT  Goal: Absence or prevention of progression during hospitalization  Description: INTERVENTIONS:  - Assess and monitor for signs and symptoms of infection  - Monitor lab/diagnostic results  - Monitor all insertion sites, i.e. indwelling lines, tubes, and drains  - Monitor endotracheal if appropriate and nasal secretions for changes in amount and color  - [place] appropriate cooling/warming therapies per order  - Administer medications as ordered  - Instruct and encourage patient and family to use good hand hygiene technique  - Identify and instruct in appropriate isolation precautions for identified infection/condition  Outcome: Progressing  Goal: Absence of fever/infection during neutropenic period  Description: INTERVENTIONS:  - Monitor WBC    Outcome: Progressing     Problem: SAFETY ADULT  Goal: Patient will remain free of falls  Description: INTERVENTIONS:  - Educate patient/family on patient safety including physical limitations  - Instruct patient to call for assistance with activity   - Consult OT/PT to assist with strengthening/mobility   - Keep Call bell within reach  - Keep bed low and locked with side rails adjusted as appropriate  - Keep care items and personal belongings within reach  - Initiate and maintain comfort rounds  - Make Fall Risk Sign visible to staff  - Offer Toileting every 2 Hours, in advance of need  - Apply yellow socks and bracelet for high fall risk patients  - Consider moving patient to room near nurses station  Outcome: Progressing  Goal: Maintain or return to baseline ADL function  Description: INTERVENTIONS:  -  Assess patient's ability to carry out ADLs; assess patient's baseline for ADL function and identify physical deficits which impact ability to perform ADLs (bathing, care of mouth/teeth, toileting, grooming, dressing, etc.)  - Assess/evaluate cause of self-care deficits   - Assess range of motion  - Assess patient's mobility; develop plan if impaired  - Assess patient's need for assistive devices and provide as appropriate  - Encourage maximum independence but intervene and supervise when necessary  - Involve family in performance of ADLs  - Assess for home care needs following discharge   - Consider OT consult to assist with ADL evaluation and planning for discharge  - Provide patient education as appropriate  Outcome: Progressing  Goal: Maintains/Returns to pre admission functional level  Description: INTERVENTIONS:  - Perform AM-PAC 6 Click Basic Mobility/ Daily Activity assessment daily.  - Set and communicate daily mobility goal to care team and patient/family/caregiver. - Collaborate with rehabilitation services on mobility goals if consulted  - Perform Range of Motion 3 times a day. - Reposition patient every 2 hours. - Dangle patient 3 times a day  - Stand patient 3 times a day  - Ambulate patient 3 times a day  - Out of bed to chair 3 times a day   - Out of bed for meals 3 times a day  - Out of bed for toileting  - Record patient progress and toleration of activity level   Outcome: Progressing     Problem: Knowledge Deficit  Goal: Patient/family/caregiver demonstrates understanding of disease process, treatment plan, medications, and discharge instructions  Description: Complete learning assessment and assess knowledge base.   Interventions:  - Provide teaching at level of understanding  - Provide teaching via preferred learning methods  Outcome: Progressing

## 2023-12-10 NOTE — ASSESSMENT & PLAN NOTE
Patient with evidence of chronic anemia who presented with rectal bleeding and drop in hgb to 6.7, prompting PRBC transfusion.   Hemoglobin is 8.3

## 2023-12-10 NOTE — ASSESSMENT & PLAN NOTE
Patient presented with new onset rectal bleeding, reportedly both melena and hematochezia --now resolving  EGD 12/5/2023 showed ulcer which was clipped  C scope showed diverticulosis only   Discussed with patient today and okay for DC  Discussed with GI and they cleared him yesterday for DC

## 2023-12-10 NOTE — DISCHARGE SUMMARY
[address] [facility]  Discharge- Jeanette Sherron. 1945, 78 y.o. male MRN: [id number]  Unit/Bed#: S -01 Encounter: [id number]  Primary Care Provider: Gopi Sapp MD   Date and time admitted to hospital: 12/4/2023 10:43 AM    Acute renal failure superimposed on stage 4 chronic kidney disease [facility])  Assessment & Plan  Lab Results   Component Value Date    EGFR 29 12/10/2023    EGFR 28 12/09/2023    EGFR 30 12/07/2023    CREATININE 2.05 (H) 12/10/2023    CREATININE 2.11 (H) 12/09/2023    CREATININE 2.01 (H) 12/07/2023     Follows with Dr. Tom Velez creatinine of 2.2-2.3 presented with creatinine of 2.9 suspect secondary to GI bleed and volume depletion. Resolved with IV fluid. Currently lisinopril and diuretics on hold. Creat 2.05 - at baseline    Acute blood loss anemia  Assessment & Plan  Patient with evidence of chronic anemia who presented with rectal bleeding and drop in hgb to 6.7, prompting PRBC transfusion. Hemoglobin is 8.3    Chronic diastolic CHF (congestive heart failure) (HCC)  Assessment & Plan    Appears euvolemic.   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 Jocelyn Matt    Primary hypertension  Assessment & Plan  Blood pressure mildly elevated   Continue amlodipine, Toprol-XL with hold parameters  BP is 129/46    PVD (peripheral vascular disease) ([address])  Assessment & Plan  Follows with Dr. Hillman Holding   History of R fem-endarterectomy, R SFA, popliteal and AT angioplasty on 3/23/23. Restarted plavix and asa yesterday   No bloody BMs since then    * GI bleed  Assessment & Plan  Patient presented with new onset rectal bleeding, reportedly both melena and hematochezia --now resolving  EGD 12/5/2023 showed ulcer which was clipped  C scope showed diverticulosis only   Discussed with patient today and okay for DC  Discussed with GI and they cleared him yesterday for DC           Medical Problems       Resolved Problems  Date Reviewed: 12/10/2023   None       Discharging Physician / Practitioner: Jessica Vigil MD  PCP: Gopi Llanos MD  Admission Date:   Admission Orders (From admission, onward)       Ordered        12/04/23 1215  [address]  Once                          Discharge Date: 12/10/23    Consultations During Hospital Stay:  GI      Procedures Performed:   EGD - Single ulcer in the fundus of the stomach with flat pigmented spot (Mikey IIC); tissue ablated with heater probe; placed 4 clips successfully  Mild erythematous mucosa with erosion in the prepyloric region  The duodenal bulb, 1st part of the duodenum and 2nd part of the duodenum appeared normal.  C-scope - FINDINGS:  Healthy end-to-side ileocolonic anastomosis in the cecum  Multiple small, scattered diverticula of moderate severity in the descending colon and sigmoid colon  Internal small hemorrhoids    Significant Findings / Test Results:   As above. Incidental Findings:   As above. Discussed above findings with patient. Test Results Pending at Discharge (will require follow up):   None. Outpatient Tests Requested:  Should monitor hemoglobin OP with PCP and GI    Complications:  none. Reason for Admission: GIB    Hospital Course:   Daisy Mitchell is a 78 y.o. male patient who originally presented to the hospital on 12/4/2023 due to melena and bright red blood per rectum.      He was on plavix for PVD and asa for CAD and this was held. EGD and C scope as above - cleared by GI for DC on 12/10/23 after restarting asa and plavix the day before. He had a normal BM this am light brown and formed. Please see above list of diagnoses and related plan for additional information. Condition at Discharge: stable    Discharge Day Visit / Exam:   Subjective:    Patient seen and examined   Feeling "okay"  "Would love to go home"  Vitals: Blood Pressure: (!) 129/46 (12/10/23 0724)  Pulse: (!) 54 (12/10/23 0724)  Temperature: 98.1 °F (36.7 °C) (12/09/23 2101)  Temp Source: Oral (12/09/23 2101)  Respirations: 18 (12/09/23 2101)  Height: 5' 10" (177.8 cm) (12/04/23 1320)  Weight - Scale: 77.5 kg (170 lb 12.8 oz) (12/10/23 0544)  SpO2: 95 % (12/10/23 0724)  Exam:   Physical Exam  Constitutional:       General: He is not in acute distress. Appearance: He is not ill-appearing, toxic-appearing or diaphoretic. HENT:      Head: Normocephalic. Eyes:      Pupils: Pupils are equal, round, and reactive to light. Cardiovascular:      Rate and Rhythm: Normal rate. Heart sounds: No murmur heard. No friction rub. No gallop. Pulmonary:      Effort: No respiratory distress. Breath sounds: No stridor. No wheezing, rhonchi or rales. Chest:      Chest wall: No tenderness. Abdominal:      General: There is no distension. Palpations: There is no mass. Tenderness: There is no abdominal tenderness. There is no right CVA tenderness, left CVA tenderness, guarding or rebound. Hernia: No hernia is present. Musculoskeletal:      Right lower leg: No edema. Left lower leg: No edema. Skin:     Coloration: Skin is not jaundiced or pale. Findings: No bruising, erythema, lesion or rash. Neurological:      General: No focal deficit present. Mental Status: He is alert. Cranial Nerves: No cranial nerve deficit. Sensory: No sensory deficit. Motor: No weakness. Coordination: Coordination normal.      Gait: Gait normal.      Deep Tendon Reflexes: Reflexes normal.   Psychiatric:         Mood and Affect: Mood normal.          Discussion with Family:  called son Kiki Camarillo - . updated    Discharge instructions/Information to patient and family:   See after visit summary for information provided to patient and family. Provisions for Follow-Up Care:  See after visit summary for information related to follow-up care and any pertinent home health orders. Disposition:   Home    Planned Readmission:none. Discharge Statement:  I spent 45 minutes discharging the patient. This time was spent on the day of discharge. I had direct contact with the patient on the day of discharge. Greater than 50% of the total time was spent examining patient, answering all patient questions, arranging and discussing plan of care with patient as well as directly providing post-discharge instructions. Additional time then spent on discharge activities. Discharge Medications:  See after visit summary for reconciled discharge medications provided to patient and/or family.       **Please Note: This note may have been constructed using a voice recognition system**

## 2023-12-10 NOTE — ASSESSMENT & PLAN NOTE
Lab Results   Component Value Date    HGBA1C 6.1 (H) 12/09/2023     Recent Labs     12/09/23  1609 12/10/23  0122 12/10/23  0723 12/10/23  1200   POCGLU 130 130 127 146*     Blood Sugar Average: Last 72 hrs:  (P) 513.3012558073320473  A1c due to be rechecked, can add to am labs  SSI  BS at goal 140-180

## 2023-12-10 NOTE — ASSESSMENT & PLAN NOTE
No recent history of drug-eluting stent  Continue plavix and ASA  Continue BB/statin  Follow up Dr. Farrah Chisholm

## 2023-12-10 NOTE — ASSESSMENT & PLAN NOTE
Follows with Dr. Bertis Phalen   History of R fem-endarterectomy, R SFA, popliteal and AT angioplasty on 3/23/23.    Restarted plavix and asa yesterday   No bloody BMs since then

## 2023-12-10 NOTE — DISCHARGE INSTR - AVS FIRST PAGE
Dear Reinier Mcfadden.,     It was our pleasure to care for you here at [facility]. It is our hope that we were always able to exceed the expected standards for your care during your stay. You were hospitalized due to gi bleed. You were cared for on the 3rd floor under the service of Tracy Davila MD with the [facility] who covers for your primary care physician (PCP), Rubens Gambino MD, while you were hospitalized. If you have any questions or concerns related to this hospitalization, you may contact us at [phone]. For follow up as well as medication refills, we recommend that you follow up with your primary care physician. A registered nurse will reach out to you by phone within a few days after your discharge to answer any additional questions that you may have after going home. However, at this time we provide for you here, the most important instructions / recommendations at discharge:     Notable Medication Adjustments -   We discharge you on protonix 40 mg twice a day,  but you could also consider stopping the metoprolol as you heart rate is low. Testing Required after Discharge -   You need to follow up with your primary care doctor for you low HR and also anemia. ** Please contact your PCP to request testing orders for any of the testing recommended here **  Important follow up information -   If you see dark or tarry stools or bright red blood per rectum call a doctor immediately. Other Instructions -     Please review this entire after visit summary as additional general instructions including medication list, appointments, activity, diet, any pertinent wound care, and other additional recommendations from your care team that may be provided for you.       Sincerely,     Tracy Davila MD

## 2023-12-10 NOTE — ASSESSMENT & PLAN NOTE
Lab Results   Component Value Date    EGFR 29 12/10/2023    EGFR 28 12/09/2023    EGFR 30 12/07/2023    CREATININE 2.05 (H) 12/10/2023    CREATININE 2.11 (H) 12/09/2023    CREATININE 2.01 (H) 12/07/2023     Follows with Dr. Riri Sofia creatinine of 2.2-2.3 presented with creatinine of 2.9 suspect secondary to GI bleed and volume depletion. Resolved with IV fluid. Currently lisinopril and diuretics on hold.   Creat 2.05 - at baseline

## 2023-12-11 ENCOUNTER — TRANSITIONAL CARE MANAGEMENT (OUTPATIENT)
Dept: FAMILY MEDICINE CLINIC | Facility: CLINIC | Age: 78
End: 2023-12-11

## 2023-12-11 DIAGNOSIS — Z71.89 COORDINATION OF COMPLEX CARE: Primary | ICD-10-CM

## 2023-12-12 ENCOUNTER — PATIENT OUTREACH (OUTPATIENT)
Dept: FAMILY MEDICINE CLINIC | Facility: CLINIC | Age: 78
End: 2023-12-12

## 2023-12-12 ENCOUNTER — RA CDI HCC (OUTPATIENT)
Dept: OTHER | Facility: HOSPITAL | Age: 78
End: 2023-12-12

## 2023-12-12 NOTE — PROGRESS NOTES
Received referral for complex care management via in basket message. Chart reviewed. Outreach to be scheduled.

## 2023-12-12 NOTE — PROGRESS NOTES
Telephone call to patient, introduced self and role of complex care management. Pt reports he is independent in making his appointments and managing his medications. Scheduled to see PCP on Friday. Declines further outreach at this time. Episode closed.

## 2023-12-14 NOTE — PROGRESS NOTES
Name: Praveen Manjarrez. : 1945      MRN: [id number]  Encounter Provider: Noelle Cee MD  Encounter Date: 12/15/2023   Encounter department: [address] [place] [address]     1. S/P TAVR (transcatheter aortic valve replacement)  Assessment & Plan:  Patient is stable  and will continue present plan of care and reassess at next routine visit. All questions about this problem from patient were answered today. Orders:  -     lisinopril (ZESTRIL) 20 mg tablet; Take 1 tablet (20 mg total) by mouth daily    2. PVD (peripheral vascular disease) ([address])  Assessment & Plan:  Patient is stable  and will continue present plan of care and reassess at next routine visit. All questions about this problem from patient were answered today. 3. Primary hypertension  Assessment & Plan:  Patient is stable with current anti-hypertensive medicine and continue to follow a low sodium diet and take current medication. All questions about this condition were answered today. 4. Mixed hyperlipidemia  Assessment & Plan:  Patient  is stable with current medication and we discussed a low fat low cholesterol diet. Weight loss also discussed for this will help lower cholesterol also. Recheck lipids in 6 months. 5. Gastroesophageal reflux disease without esophagitis  Assessment & Plan:  Patient to continue with present therapy and decrease caffeine, avoid ETOH and smoking to decrease acid production. Pt should also cease eating prior to bedtime and avoid excessive fluid intake prior to sleep. May use antacids as needed for breakthrough GERD. All pateint questions answered today about this condition. 6. Coronary artery disease involving native coronary artery of native heart without angina pectoris  Assessment & Plan:  Patient to continue  with current cardiac meds to decrease risk of re stenosis.  Patient to follow up with cardiology for scheduled appointments to decrease risk for further cardiac problems with appropriate diagnostic testing to reassess cardiac status. Patient had alll questions about this problem answered today. TCM Call     Date and time call was made  12/11/2023 11:34 AM    Hospital care reviewed  Records reviewed    Patient was hospitialized at  [address] [place] [address]    Date of Admission  12/04/23    Date of discharge  12/10/23    Diagnosis  GI Bleed    Disposition  Home    Were the patients medications reviewed and updated  No    Current Symptoms  --  right foot leg tingling    Weakness severity  Mild    Dizziness severity  Mild    Quality Character  Loss of balance    Episode pattern  Rare    Cause  No known event      TCM Call     Post hospital issues  Reduced activity    Should patient be enrolled in anticoag monitoring? No    Scheduled for follow up? Yes    Patients specialists  Cardiologist    Cardiologist name  Dr. Luis Enrique Cote or Matt Quarles    Nephrologist name  Ligia Dela Cruz MD    Did you obtain your prescribed medications  Yes    Do you need help managing your prescriptions or medications  No    Is transportation to your appointment needed  No    I have advised the patient to call PCP with any new or worsening symptoms  [address]  Family members    Support System  Family    The type of support provided  Emotional; Physical    Do you have social support  Yes, as much as I need    Comment  Pt lives alone, Niece lives in apartment below, brother lives in house behind his. Are you recieving any outpatient services  No    Are you recieving home care services  Yes    Types of home care services  Nurse visit    Are you using any community resources  No    Current waiver services  No    Have you fallen in the last 12 months  No    Interperter language line needed  No           Falls Plan of Care: balance, strength, and gait training instructions were provided. Home safety education provided.        Subjective     Is a 78-year-old male here for TCM visit from recent hospital stay for a lower GI bleed. Patient with multiple medical problems which include type 2 diabetes hypertension hyperlipidemia coronary artery disease peripheral vascular disease as well as history of gastritis with ulcer. Patient did well in the hospital and is here for his follow-up. Patient is heart rate is still low he is on a beta-blocker 12.5 mg daily hospital was it was advising him to stop his beta-blocker with his level of heart disease I would definitely wait to talk to his cardiologist before we discontinue any beta-blocker activity. Patient is back on his Plavix and his aspirin and no signs of derian bleeding from the rectum. Discussed with patient about rechecking his hemoglobin because he is now back on iron to make sure his hemoglobin is stable he must await till his arms healed up from all the bruising from the hospital we will see him back in about 6 weeks and discuss about getting a CBC at that point. Review of Systems   Constitutional:  Negative for activity change, appetite change, chills, fatigue, fever and unexpected weight change. HENT:  Negative for congestion, ear pain, hearing loss, mouth sores, postnasal drip, sinus pressure, sinus pain, sneezing and sore throat. Respiratory:  Negative for apnea, cough, shortness of breath and wheezing. Cardiovascular:  Negative for chest pain, palpitations and leg swelling. Gastrointestinal:  Negative for abdominal pain, constipation, diarrhea, nausea and vomiting. Endocrine: Negative for cold intolerance and heat intolerance. Genitourinary:  Negative for dysuria, frequency and hematuria. Musculoskeletal:  Negative for arthralgias, back pain, gait problem, joint swelling and neck pain. Skin:  Negative for rash. Neurological:  Negative for dizziness, weakness and numbness. Hematological:  Does not bruise/bleed easily.    Psychiatric/Behavioral:  Negative for agitation, behavioral problems, confusion, hallucinations and sleep disturbance. The patient is not nervous/anxious. 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 ([address]) 2/24/2023   • Basal cell carcinoma     right cheek   • CAD (coronary artery disease)    • Carotid stenosis, asymptomatic, bilateral    • Chronic kidney disease    • Colon polyp    • Diabetes ([address])     type 2, non-insulin dependent   • Diabetes mellitus (HCC)    • GERD (gastroesophageal reflux disease)    • History of nephrolithiasis    • Hyperlipidemia    • Hypertension    • Left foot pain 11/30/2022   • PAD (peripheral artery disease) (HCC)    • Severe aortic stenosis    • Squamous cell skin cancer 11/22/2022    left superior helix     Past Surgical History:   Procedure Laterality Date   • APPENDECTOMY     • CARDIAC CATHETERIZATION N/A 05/05/2022    Procedure: CARDIAC RHC/LHC; Surgeon: Florentino Rivera DO;  [facility]; Service: Cardiology   • CARDIAC CATHETERIZATION N/A 05/05/2022    Procedure: Cardiac Coronary Angiogram;  Surgeon: Florentino Rivera DO;  [facility]; Service: Cardiology   • CARDIAC CATHETERIZATION N/A 05/24/2022    Procedure: Cardiac pci;  Surgeon: Clyde Antonio MD;  [facility];   Service: Cardiology   • CARDIAC CATHETERIZATION N/A 06/14/2022    Procedure: CARDIAC TAVR;  Surgeon: Simeon Esparza MD;  Location[facility];  Service: Cardiology   • COLECTOMY     • COLONOSCOPY  2013   • CORONARY ARTERY BYPASS GRAFT  2013    X 2   • FEMORAL ARTERY - POPLITEAL ARTERY BYPASS GRAFT     • HEMORRHOID SURGERY     • IR AORTAGRAM WITH RUN-OFF  11/19/2018   • IR AORTAGRAM WITH RUN-OFF  3/2/2023   • IR LOWER EXTREMITY ANGIOGRAM  3/23/2023   • MOHS SURGERY Left 01/11/2023    [facility]   • FL SLCTV CATHJ 3RD+ ORD SLCTV ABDL PEL/LXTR [facility] Left 2016    Procedure: LEFT FEMORAL ARTERIOGRAM; BALLOON ANGIOPLASTY; SFA  AND FEMORAL AT VEIN GRAFT;  Surgeon: Jas Johnson MD;  Location[facility];  Service: Vascular   • FL TEAEC W/WO PATCH GRAFT COMMON FEMORAL Right 3/23/2023    Procedure: Common femoral endarterectomy&antegrade intervention of SFA, popliteal artery w/ shockwave;  Surgeon: Neha Graham MD;  Location: [facility];  Service: Vascular   • FL TRANSCATHETER TRANSAPICAL REPLACEMT AORTIC VALVE N/A 2022    Procedure: REPLACEMENT AORTIC VALVE TRANSCATHETER (TAVR) TRANSAPICAL 29MM IRVIN KYLER S3 ULTRA VALVE(ACCESS ON LEFT) ELANA;  Surgeon: Toma Perez DO;  Location[facility];  Service: Cardiac Surgery   • SKIN CANCER EXCISION     • TONSILLECTOMY AND ADENOIDECTOMY       Family History   Problem Relation Age of Onset   • Heart attack Father    • Other Sister         bypass and vlave replacement   • Stroke Paternal Uncle    • Arrhythmia Neg Hx    • Asthma Neg Hx    • Clotting disorder Neg Hx    • Fainting Neg Hx    • Anuerysm Neg Hx    • Hypertension Neg Hx         unsure    • Hyperlipidemia Neg Hx    • Heart failure Neg Hx      Social History     Socioeconomic History   • Marital status:      Spouse name: None   • Number of children: None   • Years of education: None   • Highest education level: None   Occupational History   • None   Tobacco Use   • Smoking status: Every Day     Current packs/day: 0.25     Average packs/day: 0.3 packs/day for 50.0 years (12.5 ttl pk-yrs)     Types: Cigarettes     Passive exposure: Current   • Smokeless tobacco: Never   Vaping Use   • Vaping status: Never Used   Substance and Sexual Activity   • Alcohol use: Not Currently     Comment: rare   • Drug use: No   • Sexual activity: Not Currently   Other Topics Concern   • None   Social History Narrative    · Most recent tobacco use screenin2018      · Do you currently or have you served in the [address] [place] [address]: Yes      · If Yes, What branch of service:   [employer]      · Occupation:   Dentists      · Exercise level:   Occasional        · Caffeine intake:   Heavy      · Marital status:         · Diet:   Regular  low fat     · Seat belts used routinely:   Yes      · Smoke alarm in home: Yes      · Advance directive: Yes      · General stress level:   Low      Social Determinants of Health     Financial Resource Strain: Low Risk  (8/2/2023)    Overall Financial Resource Strain (CARDIA)    • Difficulty of Paying Living Expenses: Not very hard   Food Insecurity: Unknown (3/24/2023)    Hunger Vital Sign    • Worried About Running Out of Food in the Last Year: Never true    • Ran Out of Food in the Last Year: Not on file   Transportation Needs: No Transportation Needs (8/2/2023)    PRAPARE - Transportation    • Lack of Transportation (Medical): No    • Lack of Transportation (Non-Medical):  No   Physical Activity: Not on file   Stress: Not on file   Social Connections: Not on file   Intimate Partner Violence: Not on file   Housing Stability: Low Risk  (3/24/2023)    Housing Stability Vital Sign    • Unable to Pay for Housing in the Last Year: No    • Number of Places Lived in the Last Year: 1    • Unstable Housing in the Last Year: No     Current Outpatient Medications on File Prior to Visit   Medication Sig   • allopurinol (ZYLOPRIM) 300 mg tablet TAKE 1 TABLET DAILY   • amLODIPine (NORVASC) 10 mg tablet TAKE 1 TABLET DAILY   • aspirin (ECOTRIN LOW STRENGTH) 81 mg EC tablet Take 81 mg by mouth daily   • atorvastatin (LIPITOR) 80 mg tablet TAKE 1 TABLET DAILY AT     BEDTIME   • calcitriol (ROCALTROL) 0.25 mcg capsule Take 1 capsule (0.25 mcg total) by mouth daily   • clopidogrel (PLAVIX) 75 mg tablet Take 1 tablet (75 mg total) by mouth daily   • D3-50 1.25 MG (48391 UT) capsule TAKE 1 CAPSULE ONCE WEEKLY   • Empagliflozin (Jardiance) 10 MG TABS tablet Take 1 tablet (10 mg total) by mouth in the morning   • glimepiride (AMARYL) 1 mg tablet Take 1 tablet (1 mg total) by mouth daily with breakfast   • glipiZIDE (GLUCOTROL) 5 mg tablet TAKE 1 TABLET DAILY WITH   BREAKFAST   • metoprolol succinate (TOPROL-XL) 25 mg 24 hr tablet Take 0.5 tablets (12.5 mg total) by mouth daily   • pancrelipase, Lip-Prot-Amyl, (CREON) 24,000 units Take 24,000 units of lipase by mouth 3 (three) times a day with meals   • pantoprazole (PROTONIX) 40 mg tablet Take 1 tablet (40 mg total) by mouth 2 (two) times a day   • torsemide (DEMADEX) 20 mg tablet TAKE 0.5 TABLETS (10 MG TOTAL) BY MOUTH DAILY TAKES 10 MG ONCE A DAY.    • [DISCONTINUED] lisinopril (ZESTRIL) 20 mg tablet Take 1 tablet (20 mg total) by mouth daily   • Cyanocobalamin (VITAMIN B12 PO) Take by mouth once a week (Patient not taking: Reported on 6/13/2023)   • Magnesium Oxide (MAG-200 PO) Take by mouth once a week (Patient not taking: Reported on 6/13/2023)     No Known Allergies  Immunization History   Administered Date(s) Administered   • COVID-19 Moderna mRNA Vaccine 12 Yr+ 50 mcg/0.5 mL (Spikevax) 10/04/2023   • COVID-19 PFIZER VACCINE 0.3 ML IM 03/03/2021, 03/24/2021, 09/30/2021   • COVID-19 Pfizer Vac BIVALENT Stuart-sucrose 12 Yr+ IM 09/29/2022   • H1N1 Inj 11/15/2020   • H1N1, All Formulations 11/15/2020   • INFLUENZA 10/04/2011, 10/19/2012, 10/16/2014, 10/13/2016, 10/06/2018, 11/11/2021, 10/19/2022, 10/19/2023   • Influenza Split High Dose Preservative Free IM 10/06/2018, 11/01/2019   • Pneumococcal Conjugate 13-Valent 12/04/2015   • Pneumococcal Polysaccharide PPV23 06/11/2019   • Respiratory Syncytial Virus Vaccine (Recombinant, Adjuvanted) 10/04/2023   • Tdap 10/15/2021       Objective     BP (!) 138/42 (BP Location: Left arm, Patient Position: Sitting, Cuff Size: Standard)   Pulse 58   Temp 97.5 °F (36.4 °C) (Temporal)   Resp 16   Ht 5' 10" (1.778 m)   Wt 77.6 kg (171 lb)   SpO2 98%   BMI 24.54 kg/m²     Physical Exam  Vitals and nursing note reviewed. Constitutional:       Appearance: He is well-developed. HENT:      Head: Normocephalic and atraumatic. Nose: Nose normal.   Eyes:      General: No scleral icterus. Conjunctiva/sclera: Conjunctivae normal.      Pupils: Pupils are equal, round, and reactive to light. Neck:      Thyroid: No thyromegaly. Cardiovascular:      Rate and Rhythm: Normal rate and regular rhythm. Heart sounds: Normal heart sounds. Pulmonary:      Effort: Pulmonary effort is normal. No respiratory distress. Breath sounds: Normal breath sounds. No wheezing. Abdominal:      General: Bowel sounds are normal.      Palpations: Abdomen is soft. Tenderness: There is no abdominal tenderness. There is no guarding or rebound. Musculoskeletal:         General: Normal range of motion. Cervical back: Normal range of motion and neck supple. Skin:     General: Skin is warm and dry. Findings: No rash. Neurological:      Mental Status: He is alert and oriented to person, place, and time. Psychiatric:         Mood and Affect: Mood normal.         Behavior: Behavior normal.         Thought Content:  Thought content normal.         Judgment: Judgment normal.       Althea Renee MD

## 2023-12-15 ENCOUNTER — OFFICE VISIT (OUTPATIENT)
Dept: FAMILY MEDICINE CLINIC | Facility: CLINIC | Age: 78
End: 2023-12-15
Payer: MEDICARE

## 2023-12-15 VITALS
HEART RATE: 58 BPM | TEMPERATURE: 97.5 F | BODY MASS INDEX: 24.48 KG/M2 | DIASTOLIC BLOOD PRESSURE: 42 MMHG | WEIGHT: 171 LBS | RESPIRATION RATE: 16 BRPM | SYSTOLIC BLOOD PRESSURE: 138 MMHG | HEIGHT: 70 IN | OXYGEN SATURATION: 98 %

## 2023-12-15 DIAGNOSIS — Z95.2 S/P TAVR (TRANSCATHETER AORTIC VALVE REPLACEMENT): Primary | ICD-10-CM

## 2023-12-15 DIAGNOSIS — I73.9 PVD (PERIPHERAL VASCULAR DISEASE) (HCC): ICD-10-CM

## 2023-12-15 DIAGNOSIS — K21.9 GASTROESOPHAGEAL REFLUX DISEASE WITHOUT ESOPHAGITIS: ICD-10-CM

## 2023-12-15 DIAGNOSIS — I10 PRIMARY HYPERTENSION: ICD-10-CM

## 2023-12-15 DIAGNOSIS — I25.10 CORONARY ARTERY DISEASE INVOLVING NATIVE CORONARY ARTERY OF NATIVE HEART WITHOUT ANGINA PECTORIS: ICD-10-CM

## 2023-12-15 DIAGNOSIS — E78.2 MIXED HYPERLIPIDEMIA: ICD-10-CM

## 2023-12-15 PROCEDURE — 99496 TRANSJ CARE MGMT HIGH F2F 7D: CPT | Performed by: FAMILY MEDICINE

## 2023-12-15 RX ORDER — LISINOPRIL 20 MG/1
20 TABLET ORAL DAILY
Status: ON HOLD
Start: 2023-12-15

## 2023-12-21 ENCOUNTER — HOSPITAL ENCOUNTER (OUTPATIENT)
Dept: RADIOLOGY | Facility: MEDICAL CENTER | Age: 78
Discharge: HOME | DRG: 438 | End: 2023-12-21
Payer: MEDICARE

## 2023-12-21 DIAGNOSIS — I70.234 ATHEROSCLEROSIS OF NATIVE ARTERY OF RIGHT LOWER EXTREMITY WITH ULCERATION OF MIDFOOT (HCC): ICD-10-CM

## 2023-12-21 DIAGNOSIS — I65.23 ASYMPTOMATIC BILATERAL CAROTID ARTERY STENOSIS: ICD-10-CM

## 2023-12-21 PROCEDURE — 93925 LOWER EXTREMITY STUDY: CPT | Performed by: SURGERY

## 2023-12-21 PROCEDURE — 93922 UPR/L XTREMITY ART 2 LEVELS: CPT | Performed by: SURGERY

## 2023-12-21 PROCEDURE — 93880 EXTRACRANIAL BILAT STUDY: CPT | Performed by: SURGERY

## 2023-12-21 PROCEDURE — 93923 UPR/LXTR ART STDY 3+ LVLS: CPT

## 2023-12-21 PROCEDURE — 93880 EXTRACRANIAL BILAT STUDY: CPT

## 2023-12-21 PROCEDURE — 93925 LOWER EXTREMITY STUDY: CPT

## 2023-12-22 ENCOUNTER — APPOINTMENT (EMERGENCY)
Dept: CT IMAGING | Facility: HOSPITAL | Age: 78
DRG: 438 | End: 2023-12-22
Payer: MEDICARE

## 2023-12-22 ENCOUNTER — HOSPITAL ENCOUNTER (INPATIENT)
Facility: HOSPITAL | Age: 78
LOS: 3 days | Discharge: HOME | DRG: 438 | End: 2023-12-25
Attending: EMERGENCY MEDICINE | Admitting: INTERNAL MEDICINE
Payer: MEDICARE

## 2023-12-22 DIAGNOSIS — K92.2 GASTROINTESTINAL HEMORRHAGE, UNSPECIFIED GASTROINTESTINAL HEMORRHAGE TYPE: ICD-10-CM

## 2023-12-22 DIAGNOSIS — N17.9 AKI (ACUTE KIDNEY INJURY) (HCC): ICD-10-CM

## 2023-12-22 DIAGNOSIS — K52.9 COLITIS: ICD-10-CM

## 2023-12-22 DIAGNOSIS — K92.1 MELENA: Primary | ICD-10-CM

## 2023-12-22 DIAGNOSIS — N18.9 ACUTE KIDNEY INJURY SUPERIMPOSED ON CHRONIC KIDNEY DISEASE: ICD-10-CM

## 2023-12-22 DIAGNOSIS — N17.9 ACUTE KIDNEY INJURY SUPERIMPOSED ON CHRONIC KIDNEY DISEASE: ICD-10-CM

## 2023-12-22 PROBLEM — K86.1 CHRONIC PANCREATITIS (HCC): Status: ACTIVE | Noted: 2023-12-22

## 2023-12-22 LAB
ABO GROUP BLD: NORMAL
ALBUMIN SERPL BCP-MCNC: 3.9 G/DL (ref 3.5–5)
ALP SERPL-CCNC: 62 U/L (ref 34–104)
ALT SERPL W P-5'-P-CCNC: 6 U/L (ref 7–52)
ANION GAP SERPL CALCULATED.3IONS-SCNC: 8 MMOL/L
APTT PPP: 26 SECONDS (ref 23–37)
AST SERPL W P-5'-P-CCNC: 10 U/L (ref 13–39)
BASOPHILS # BLD AUTO: 0.02 THOUSANDS/ÂΜL (ref 0–0.1)
BASOPHILS NFR BLD AUTO: 0 % (ref 0–1)
BILIRUB SERPL-MCNC: 0.35 MG/DL (ref 0.2–1)
BLD GP AB SCN SERPL QL: NEGATIVE
BUN SERPL-MCNC: 67 MG/DL (ref 5–25)
CALCIUM SERPL-MCNC: 9 MG/DL (ref 8.4–10.2)
CHLORIDE SERPL-SCNC: 109 MMOL/L (ref 96–108)
CO2 SERPL-SCNC: 24 MMOL/L (ref 21–32)
CREAT SERPL-MCNC: 3.22 MG/DL (ref 0.6–1.3)
EOSINOPHIL # BLD AUTO: 0.14 THOUSAND/ÂΜL (ref 0–0.61)
EOSINOPHIL NFR BLD AUTO: 2 % (ref 0–6)
ERYTHROCYTE [DISTWIDTH] IN BLOOD BY AUTOMATED COUNT: 15.9 % (ref 11.6–15.1)
GFR SERPL CREATININE-BSD FRML MDRD: 17 ML/MIN/1.73SQ M
GLUCOSE SERPL-MCNC: 139 MG/DL (ref 65–140)
GLUCOSE SERPL-MCNC: 93 MG/DL (ref 65–140)
HCT VFR BLD AUTO: 26.1 % (ref 36.5–49.3)
HGB BLD-MCNC: 8.2 G/DL (ref 12–17)
IMM GRANULOCYTES # BLD AUTO: 0.03 THOUSAND/UL (ref 0–0.2)
IMM GRANULOCYTES NFR BLD AUTO: 0 % (ref 0–2)
INR PPP: 0.96 (ref 0.84–1.19)
LACTATE SERPL-SCNC: 1.3 MMOL/L (ref 0.5–2)
LIPASE SERPL-CCNC: 8 U/L (ref 11–82)
LYMPHOCYTES # BLD AUTO: 0.93 THOUSANDS/ÂΜL (ref 0.6–4.47)
LYMPHOCYTES NFR BLD AUTO: 14 % (ref 14–44)
MAGNESIUM SERPL-MCNC: 1.9 MG/DL (ref 1.9–2.7)
MCH RBC QN AUTO: 31.3 PG (ref 26.8–34.3)
MCHC RBC AUTO-ENTMCNC: 31.4 G/DL (ref 31.4–37.4)
MCV RBC AUTO: 100 FL (ref 82–98)
MONOCYTES # BLD AUTO: 0.37 THOUSAND/ÂΜL (ref 0.17–1.22)
MONOCYTES NFR BLD AUTO: 6 % (ref 4–12)
NEUTROPHILS # BLD AUTO: 5.21 THOUSANDS/ÂΜL (ref 1.85–7.62)
NEUTS SEG NFR BLD AUTO: 78 % (ref 43–75)
NRBC BLD AUTO-RTO: 0 /100 WBCS
PLATELET # BLD AUTO: 180 THOUSANDS/UL (ref 149–390)
PMV BLD AUTO: 10.3 FL (ref 8.9–12.7)
POTASSIUM SERPL-SCNC: 4.2 MMOL/L (ref 3.5–5.3)
PROT SERPL-MCNC: 6.7 G/DL (ref 6.4–8.4)
PROTHROMBIN TIME: 13.4 SECONDS (ref 11.6–14.5)
RBC # BLD AUTO: 2.62 MILLION/UL (ref 3.88–5.62)
RH BLD: POSITIVE
SODIUM SERPL-SCNC: 141 MMOL/L (ref 135–147)
SPECIMEN EXPIRATION DATE: NORMAL
WBC # BLD AUTO: 6.7 THOUSAND/UL (ref 4.31–10.16)

## 2023-12-22 PROCEDURE — 86901 BLOOD TYPING SEROLOGIC RH(D): CPT

## 2023-12-22 PROCEDURE — 86923 COMPATIBILITY TEST ELECTRIC: CPT

## 2023-12-22 PROCEDURE — 99223 1ST HOSP IP/OBS HIGH 75: CPT | Performed by: INTERNAL MEDICINE

## 2023-12-22 PROCEDURE — 85025 COMPLETE CBC W/AUTO DIFF WBC: CPT

## 2023-12-22 PROCEDURE — 82948 REAGENT STRIP/BLOOD GLUCOSE: CPT

## 2023-12-22 PROCEDURE — 86850 RBC ANTIBODY SCREEN: CPT

## 2023-12-22 PROCEDURE — 85610 PROTHROMBIN TIME: CPT

## 2023-12-22 PROCEDURE — 85018 HEMOGLOBIN: CPT | Performed by: INTERNAL MEDICINE

## 2023-12-22 PROCEDURE — 0DJ08ZZ INSPECTION OF UPPER INTESTINAL TRACT, VIA NATURAL OR ARTIFICIAL OPENING ENDOSCOPIC: ICD-10-PCS | Performed by: EMERGENCY MEDICINE

## 2023-12-22 PROCEDURE — 74176 CT ABD & PELVIS W/O CONTRAST: CPT

## 2023-12-22 PROCEDURE — 83735 ASSAY OF MAGNESIUM: CPT

## 2023-12-22 PROCEDURE — 86900 BLOOD TYPING SEROLOGIC ABO: CPT

## 2023-12-22 PROCEDURE — 83605 ASSAY OF LACTIC ACID: CPT

## 2023-12-22 PROCEDURE — 99285 EMERGENCY DEPT VISIT HI MDM: CPT | Performed by: EMERGENCY MEDICINE

## 2023-12-22 PROCEDURE — 36415 COLL VENOUS BLD VENIPUNCTURE: CPT

## 2023-12-22 PROCEDURE — 99284 EMERGENCY DEPT VISIT MOD MDM: CPT | Performed by: INTERNAL MEDICINE

## 2023-12-22 PROCEDURE — 96366 THER/PROPH/DIAG IV INF ADDON: CPT

## 2023-12-22 PROCEDURE — 30243N1 TRANSFUSION OF NONAUTOLOGOUS RED BLOOD CELLS INTO CENTRAL VEIN, PERCUTANEOUS APPROACH: ICD-10-PCS | Performed by: EMERGENCY MEDICINE

## 2023-12-22 PROCEDURE — 83690 ASSAY OF LIPASE: CPT

## 2023-12-22 PROCEDURE — 96365 THER/PROPH/DIAG IV INF INIT: CPT

## 2023-12-22 PROCEDURE — 96376 TX/PRO/DX INJ SAME DRUG ADON: CPT

## 2023-12-22 PROCEDURE — 80053 COMPREHEN METABOLIC PANEL: CPT

## 2023-12-22 PROCEDURE — 99285 EMERGENCY DEPT VISIT HI MDM: CPT

## 2023-12-22 PROCEDURE — 99222 1ST HOSP IP/OBS MODERATE 55: CPT | Performed by: STUDENT IN AN ORGANIZED HEALTH CARE EDUCATION/TRAINING PROGRAM

## 2023-12-22 PROCEDURE — G1004 CDSM NDSC: HCPCS

## 2023-12-22 PROCEDURE — C9113 INJ PANTOPRAZOLE SODIUM, VIA: HCPCS

## 2023-12-22 PROCEDURE — 85014 HEMATOCRIT: CPT | Performed by: INTERNAL MEDICINE

## 2023-12-22 PROCEDURE — 85730 THROMBOPLASTIN TIME PARTIAL: CPT

## 2023-12-22 RX ORDER — ACETAMINOPHEN 325 MG/1
650 TABLET ORAL EVERY 4 HOURS PRN
Status: DISCONTINUED | OUTPATIENT
Start: 2023-12-22 | End: 2023-12-25 | Stop reason: HOSPADM

## 2023-12-22 RX ORDER — METOPROLOL SUCCINATE 25 MG/1
12.5 TABLET, EXTENDED RELEASE ORAL DAILY
Status: DISCONTINUED | OUTPATIENT
Start: 2023-12-22 | End: 2023-12-25 | Stop reason: HOSPADM

## 2023-12-22 RX ORDER — ATORVASTATIN CALCIUM 40 MG/1
80 TABLET, FILM COATED ORAL
Status: DISCONTINUED | OUTPATIENT
Start: 2023-12-22 | End: 2023-12-25 | Stop reason: HOSPADM

## 2023-12-22 RX ORDER — ALLOPURINOL 300 MG/1
300 TABLET ORAL DAILY
Status: DISCONTINUED | OUTPATIENT
Start: 2023-12-22 | End: 2023-12-25 | Stop reason: HOSPADM

## 2023-12-22 RX ORDER — ONDANSETRON 2 MG/ML
4 INJECTION INTRAMUSCULAR; INTRAVENOUS EVERY 6 HOURS PRN
Status: DISCONTINUED | OUTPATIENT
Start: 2023-12-22 | End: 2023-12-25 | Stop reason: HOSPADM

## 2023-12-22 RX ORDER — AMLODIPINE BESYLATE 10 MG/1
10 TABLET ORAL DAILY
Status: DISCONTINUED | OUTPATIENT
Start: 2023-12-22 | End: 2023-12-25 | Stop reason: HOSPADM

## 2023-12-22 RX ORDER — LABETALOL HYDROCHLORIDE 5 MG/ML
10 INJECTION, SOLUTION INTRAVENOUS EVERY 4 HOURS PRN
Status: DISCONTINUED | OUTPATIENT
Start: 2023-12-22 | End: 2023-12-25 | Stop reason: HOSPADM

## 2023-12-22 RX ORDER — SODIUM CHLORIDE, SODIUM GLUCONATE, SODIUM ACETATE, POTASSIUM CHLORIDE, MAGNESIUM CHLORIDE, SODIUM PHOSPHATE, DIBASIC, AND POTASSIUM PHOSPHATE .53; .5; .37; .037; .03; .012; .00082 G/100ML; G/100ML; G/100ML; G/100ML; G/100ML; G/100ML; G/100ML
75 INJECTION, SOLUTION INTRAVENOUS CONTINUOUS
Status: DISCONTINUED | OUTPATIENT
Start: 2023-12-22 | End: 2023-12-24

## 2023-12-22 RX ORDER — NICOTINE 21 MG/24HR
1 PATCH, TRANSDERMAL 24 HOURS TRANSDERMAL DAILY
Status: DISCONTINUED | OUTPATIENT
Start: 2023-12-22 | End: 2023-12-25 | Stop reason: HOSPADM

## 2023-12-22 RX ORDER — HEPARIN SODIUM 5000 [USP'U]/ML
5000 INJECTION, SOLUTION INTRAVENOUS; SUBCUTANEOUS EVERY 8 HOURS SCHEDULED
Status: DISCONTINUED | OUTPATIENT
Start: 2023-12-22 | End: 2023-12-22

## 2023-12-22 RX ORDER — CALCITRIOL 0.25 UG/1
0.25 CAPSULE, LIQUID FILLED ORAL DAILY
Status: DISCONTINUED | OUTPATIENT
Start: 2023-12-22 | End: 2023-12-25 | Stop reason: HOSPADM

## 2023-12-22 RX ORDER — INSULIN LISPRO 100 [IU]/ML
1-6 INJECTION, SOLUTION INTRAVENOUS; SUBCUTANEOUS
Status: DISCONTINUED | OUTPATIENT
Start: 2023-12-22 | End: 2023-12-25 | Stop reason: HOSPADM

## 2023-12-22 RX ADMIN — METOPROLOL SUCCINATE 12.5 MG: 25 TABLET, EXTENDED RELEASE ORAL at 16:46

## 2023-12-22 RX ADMIN — PANTOPRAZOLE SODIUM 8 MG/HR: 40 INJECTION, POWDER, FOR SOLUTION INTRAVENOUS at 12:23

## 2023-12-22 RX ADMIN — SODIUM CHLORIDE 1000 ML: 0.9 INJECTION, SOLUTION INTRAVENOUS at 13:06

## 2023-12-22 RX ADMIN — SODIUM CHLORIDE, SODIUM GLUCONATE, SODIUM ACETATE, POTASSIUM CHLORIDE, MAGNESIUM CHLORIDE, SODIUM PHOSPHATE, DIBASIC, AND POTASSIUM PHOSPHATE 75 ML/HR: .53; .5; .37; .037; .03; .012; .00082 INJECTION, SOLUTION INTRAVENOUS at 17:32

## 2023-12-22 RX ADMIN — PANTOPRAZOLE SODIUM 80 MG: 40 INJECTION, POWDER, FOR SOLUTION INTRAVENOUS at 12:18

## 2023-12-22 RX ADMIN — ALLOPURINOL 300 MG: 300 TABLET ORAL at 16:45

## 2023-12-22 RX ADMIN — CALCITRIOL 0.25 MCG: 0.25 CAPSULE, LIQUID FILLED ORAL at 16:48

## 2023-12-22 RX ADMIN — PANTOPRAZOLE SODIUM 8 MG/HR: 40 INJECTION, POWDER, FOR SOLUTION INTRAVENOUS at 23:33

## 2023-12-22 RX ADMIN — AMLODIPINE BESYLATE 10 MG: 10 TABLET ORAL at 16:46

## 2023-12-22 RX ADMIN — ATORVASTATIN CALCIUM 80 MG: 40 TABLET, FILM COATED ORAL at 16:44

## 2023-12-22 NOTE — ASSESSMENT & PLAN NOTE
Has been taking Creon 3 times daily with foods.  At baseline patient has 3-4 bowel movements per day, denies lactose intolerance.  Usual diet-salads, spaghetti, fiber, with consumption of 20 ounces of water/day  Denies abdominal pain, abdominal distention, bloating, weight loss    12/22/2023 CT abdomen finding: Sequela of chronic pancreatitis. A 2 cm hyperdense structure in the head of the pancreas is indeterminate but in retrospect is stable since at least 2021. This may reflect a hematoma or thrombosed pseudoaneurysm as the sequela of previous pancreatitis. Hyperdense cystic lesion is alternative possibility. Greater than 2-year stability favors benignity. A 6 to 12-month follow-up, preferably with MRI if performed, may be considered depending on comorbidities.\     Plan:  Diabetic clear liquid diet for now.  Continue Creon

## 2023-12-22 NOTE — ED PROVIDER NOTES
History  Chief Complaint   Patient presents with    Black or Bloody Stool     Patient arrived via EMS c/o bloody stool. Recently discharged with diverticulitis and had to receive 2 units of RBC total. Patient also c/o dizziness.     78-year-old male with past medical history of CAD on Plavix and aspirin, CKD, diabetes, hypertension presents for evaluation of melena that was first noticed yesterday and then had a larger episode this morning around 830.  Patient reports he started becoming lightheaded yesterday however has been ambulating without difficulty.  This morning around 830, was on his way to the bathroom when he suddenly had a large amount of melena stool in his underwear.  Denies associated symptoms of abdominal pain, N/V/D, fever/chills, cough or any other symptoms.  No recent medication changes.  No other concerns.        Prior to Admission Medications   Prescriptions Last Dose Informant Patient Reported? Taking?   D3-50 1.25 MG (48134 UT) capsule   No No   Sig: TAKE 1 CAPSULE ONCE WEEKLY   Empagliflozin (Jardiance) 10 MG TABS tablet  Self No No   Sig: Take 1 tablet (10 mg total) by mouth in the morning   allopurinol (ZYLOPRIM) 300 mg tablet   No No   Sig: TAKE 1 TABLET DAILY   amLODIPine (NORVASC) 10 mg tablet   No No   Sig: TAKE 1 TABLET DAILY   aspirin (ECOTRIN LOW STRENGTH) 81 mg EC tablet  Self Yes No   Sig: Take 81 mg by mouth daily   atorvastatin (LIPITOR) 80 mg tablet  Self No No   Sig: TAKE 1 TABLET DAILY AT     BEDTIME   calcitriol (ROCALTROL) 0.25 mcg capsule  Self No No   Sig: Take 1 capsule (0.25 mcg total) by mouth daily   clopidogrel (PLAVIX) 75 mg tablet  Self No No   Sig: Take 1 tablet (75 mg total) by mouth daily   glimepiride (AMARYL) 1 mg tablet  Self No No   Sig: Take 1 tablet (1 mg total) by mouth daily with breakfast   glipiZIDE (GLUCOTROL) 5 mg tablet   No No   Sig: TAKE 1 TABLET DAILY WITH   BREAKFAST   lisinopril (ZESTRIL) 20 mg tablet   No No   Sig: Take 1 tablet (20 mg total)  by mouth daily   metoprolol succinate (TOPROL-XL) 25 mg 24 hr tablet  Self No No   Sig: Take 0.5 tablets (12.5 mg total) by mouth daily   pancrelipase, Lip-Prot-Amyl, (CREON) 24,000 units  Self No No   Sig: Take 24,000 units of lipase by mouth 3 (three) times a day with meals   pantoprazole (PROTONIX) 40 mg tablet   No No   Sig: Take 1 tablet (40 mg total) by mouth 2 (two) times a day   torsemide (DEMADEX) 20 mg tablet  Self No No   Sig: TAKE 0.5 TABLETS (10 MG TOTAL) BY MOUTH DAILY TAKES 10 MG ONCE A DAY.      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) 2/24/2023    Basal cell carcinoma     right cheek    CAD (coronary artery disease)     Carotid stenosis, asymptomatic, bilateral     Chronic kidney disease     Colon polyp     Diabetes ([facility])     type 2, non-insulin dependent    Diabetes mellitus ([facility])     GERD (gastroesophageal reflux disease)     History of nephrolithiasis     Hyperlipidemia     Hypertension     Left foot pain 11/30/2022    PAD (peripheral artery disease) ([facility])     Severe aortic stenosis     Squamous cell skin cancer 11/22/2022    left superior helix       Past Surgical History:   Procedure Laterality Date    APPENDECTOMY      CARDIAC CATHETERIZATION N/A 05/05/2022    Procedure: CARDIAC RHC/LHC;  Surgeon: Arvin Sanchez DO;  Location: [facility];  Service: Cardiology    CARDIAC CATHETERIZATION N/A 05/05/2022    Procedure: Cardiac Coronary Angiogram;  Surgeon: Arvin Sanchez DO;  Location: [facility];  Service: Cardiology    CARDIAC CATHETERIZATION N/A 05/24/2022    Procedure: Cardiac pci;  Surgeon: Damien Jeter MD;  Location: [facility];  Service: Cardiology    CARDIAC CATHETERIZATION N/A 06/14/2022    Procedure:  CARDIAC TAVR;  Surgeon: Nahum Vaz MD;  Location: [facility];  Service: Cardiology    COLECTOMY      COLONOSCOPY  2013    CORONARY ARTERY BYPASS GRAFT  2013    X 2    FEMORAL ARTERY - POPLITEAL ARTERY BYPASS GRAFT      HEMORRHOID SURGERY      IR AORTAGRAM WITH RUN-OFF  11/19/2018    IR AORTAGRAM WITH RUN-OFF  3/2/2023    IR LOWER EXTREMITY ANGIOGRAM  3/23/2023    MOHS SURGERY Left 01/11/2023    SCC left superior helix-Dr Guy    NE SLCTV CATHJ 3RD+ ORD SLCTV ABDL PEL/LXTR BRNCH Left 08/12/2016    Procedure: LEFT FEMORAL ARTERIOGRAM; BALLOON ANGIOPLASTY; SFA  AND FEMORAL AT VEIN GRAFT;  Surgeon: Nicholas Urena MD;  Location: [facility];  Service: Vascular    NE TEAEC W/WO PATCH GRAFT COMMON FEMORAL Right 3/23/2023    Procedure: Common femoral endarterectomy&antegrade intervention of SFA, popliteal artery w/ shockwave;  Surgeon: Eagle Higuera MD;  Location: [facility];  Service: Vascular    NE TRANSCATHETER TRANSAPICAL REPLACEMT AORTIC VALVE N/A 06/14/2022    Procedure: REPLACEMENT AORTIC VALVE TRANSCATHETER (TAVR) TRANSAPICAL 29MM IRVIN KYLER S3 ULTRA VALVE(ACCESS ON LEFT) ELANA;  Surgeon: Amarjit Gordon DO;  Location: [facility];  Service: Cardiac Surgery    SKIN CANCER EXCISION      TONSILLECTOMY AND ADENOIDECTOMY         Family History   Problem Relation Age of Onset    Heart attack Father     Other Sister         bypass and vlave replacement    Stroke Paternal Uncle     Arrhythmia Neg Hx     Asthma Neg Hx     Clotting disorder Neg Hx     Fainting Neg Hx     Anuerysm Neg Hx     Hypertension Neg Hx         unsure     Hyperlipidemia Neg Hx     Heart failure Neg Hx      I have reviewed and agree with the history as documented.    E-Cigarette/Vaping    E-Cigarette Use Never User      E-Cigarette/Vaping Substances    Nicotine No     THC No     CBD No     Flavoring No     Other No     Unknown No      Social History     Tobacco Use    Smoking status: Every Day     Current packs/day: 0.25     Average  packs/day: 0.3 packs/day for 50.0 years (12.5 ttl pk-yrs)     Types: Cigarettes     Passive exposure: Current    Smokeless tobacco: Never   Vaping Use    Vaping status: Never Used   Substance Use Topics    Alcohol use: Not Currently     Comment: rare    Drug use: No        Review of Systems   Constitutional:  Negative for chills and fever.   HENT:  Negative for ear pain and sore throat.    Eyes:  Negative for pain and visual disturbance.   Respiratory:  Negative for cough and shortness of breath.    Cardiovascular:  Negative for chest pain and palpitations.   Gastrointestinal:  Positive for blood in stool. Negative for abdominal pain, diarrhea, nausea and vomiting.   Genitourinary:  Negative for dysuria and hematuria.   Musculoskeletal:  Negative for arthralgias and back pain.   Skin:  Negative for color change and rash.   Neurological:  Negative for seizures and syncope.   All other systems reviewed and are negative.      Physical Exam  ED Triage Vitals [12/22/23 1130]   Temperature Pulse Respirations Blood Pressure SpO2   97.8 °F (36.6 °C) 74 18 154/63 99 %      Temp Source Heart Rate Source Patient Position - Orthostatic VS BP Location FiO2 (%)   Oral Monitor -- -- --      Pain Score       --             Orthostatic Vital Signs  Vitals:    12/22/23 1200 12/22/23 1230 12/22/23 1415 12/22/23 1646   BP: 156/69 147/67 123/51 127/56   Pulse: 62 56 62 64       Physical Exam  Vitals and nursing note reviewed.   Constitutional:       General: He is not in acute distress.     Appearance: Normal appearance. He is well-developed.   HENT:      Head: Normocephalic and atraumatic.      Right Ear: External ear normal.      Left Ear: External ear normal.      Nose: Nose normal.      Mouth/Throat:      Mouth: Mucous membranes are moist.   Eyes:      Extraocular Movements: Extraocular movements intact.      Comments: Pallorous conjunctiva bilaterally   Cardiovascular:      Rate and Rhythm: Normal rate and regular rhythm.       Pulses: Normal pulses.      Heart sounds: Normal heart sounds. No murmur heard.  Pulmonary:      Effort: Pulmonary effort is normal. No respiratory distress.      Breath sounds: Normal breath sounds.   Abdominal:      General: Abdomen is flat.      Palpations: Abdomen is soft.      Tenderness: There is abdominal tenderness. There is no guarding or rebound.      Comments: Very mild generalized, diffuse discomfort of the abdomen to palpation.  Otherwise soft, no rebound/guarding tenderness.  No distention.   Genitourinary:     Comments: Grossly Melanous stool noted on underwear  Musculoskeletal:         General: No swelling. Normal range of motion.      Cervical back: Neck supple.      Right lower leg: No edema.      Left lower leg: No edema.   Skin:     General: Skin is warm and dry.      Capillary Refill: Capillary refill takes less than 2 seconds.   Neurological:      Mental Status: He is alert and oriented to person, place, and time. Mental status is at baseline.   Psychiatric:         Mood and Affect: Mood normal.         Behavior: Behavior normal.         ED Medications  Medications   pantoprazole (PROTONIX) 80 mg in sodium chloride 0.9 % 100 mL infusion (8 mg/hr Intravenous New Bag 12/22/23 1223)   allopurinol (ZYLOPRIM) tablet 300 mg (300 mg Oral Given 12/22/23 1645)   amLODIPine (NORVASC) tablet 10 mg (10 mg Oral Given 12/22/23 1646)   atorvastatin (LIPITOR) tablet 80 mg (80 mg Oral Given 12/22/23 1644)   calcitriol (ROCALTROL) capsule 0.25 mcg (0.25 mcg Oral Given 12/22/23 1648)   metoprolol succinate (TOPROL-XL) 24 hr tablet 12.5 mg (12.5 mg Oral Given 12/22/23 1646)   pancrelipase (Lip-Prot-Amyl) (CREON) delayed release capsule 24,000 Units (24,000 Units Oral Not Given 12/22/23 1648)   nicotine (NICODERM CQ) 14 mg/24hr TD 24 hr patch 1 patch (1 patch Transdermal Not Given 12/22/23 1650)   multi-electrolyte (PLASMALYTE-A/ISOLYTE-S PH 7.4) IV solution (has no administration in time range)   insulin lispro  (HumaLOG) 100 units/mL subcutaneous injection 1-6 Units ( Subcutaneous Not Given 12/22/23 1643)   labetalol (NORMODYNE) injection 10 mg (has no administration in time range)   pantoprazole (PROTONIX) 80 mg in sodium chloride 0.9 % 100 mL IVPB (0 mg Intravenous Stopped 12/22/23 1225)   sodium chloride 0.9 % bolus 1,000 mL (0 mL Intravenous Stopped 12/22/23 1533)       Diagnostic Studies  Results Reviewed       Procedure Component Value Units Date/Time    Fingerstick Glucose (POCT) [[id number]]  (Normal) Collected: 12/22/23 1643    Lab Status: Final result Updated: 12/22/23 1651     POC Glucose 93 mg/dl     Lactic acid, plasma (w/reflex if result > 2.0) [[id number]]  (Normal) Collected: 12/22/23 1157    Lab Status: Final result Specimen: Blood from Arm, Left Updated: 12/22/23 1226     LACTIC ACID 1.3 mmol/L     Narrative:      Result may be elevated if tourniquet was used during collection.    Comprehensive metabolic panel [[id number]]  (Abnormal) Collected: 12/22/23 1157    Lab Status: Final result Specimen: Blood from Arm, Left Updated: 12/22/23 1225     Sodium 141 mmol/L      Potassium 4.2 mmol/L      Chloride 109 mmol/L      CO2 24 mmol/L      ANION GAP 8 mmol/L      BUN 67 mg/dL      Creatinine 3.22 mg/dL      Glucose 139 mg/dL      Calcium 9.0 mg/dL      AST 10 U/L      ALT 6 U/L      Alkaline Phosphatase 62 U/L      Total Protein 6.7 g/dL      Albumin 3.9 g/dL      Total Bilirubin 0.35 mg/dL      eGFR 17 ml/min/1.73sq m     Narrative:      National Kid[employer] Foundation guidelines for Chronic Kidney Disease (CKD):     Stage 1 with normal or high GFR (GFR > 90 mL/min/1.73 square meters)    Stage 2 Mild CKD (GFR = 60-89 mL/min/1.73 square meters)    Stage 3A Moderate CKD (GFR = 45-59 mL/min/1.73 square meters)    Stage 3B Moderate CKD (GFR = 30-44 mL/min/1.73 square meters)    Stage 4 Severe CKD (GFR = 15-29 mL/min/1.73 square meters)    Stage 5 End Stage CKD (GFR <15 mL/min/1.73 square meters)  Note: GFR  calculation is accurate only with a steady state creatinine    Lipase [[id number]]  (Abnormal) Collected: 12/22/23 1157    Lab Status: Final result Specimen: Blood from Arm, Left Updated: 12/22/23 1225     Lipase 8 u/L     Magnesium [[id number]]  (Normal) Collected: 12/22/23 1157    Lab Status: Final result Specimen: Blood from Arm, Left Updated: 12/22/23 1225     Magnesium 1.9 mg/dL     Protime-INR [[id number]]  (Normal) Collected: 12/22/23 1157    Lab Status: Final result Specimen: Blood from Arm, Left Updated: 12/22/23 1221     Protime 13.4 seconds      INR 0.96    APTT [[id number]]  (Normal) Collected: 12/22/23 1157    Lab Status: Final result Specimen: Blood from Arm, Left Updated: 12/22/23 1221     PTT 26 seconds     CBC and differential [[id number]]  (Abnormal) Collected: 12/22/23 1157    Lab Status: Final result Specimen: Blood from Arm, Left Updated: 12/22/23 1210     WBC 6.70 Thousand/uL      RBC 2.62 Million/uL      Hemoglobin 8.2 g/dL      Hematocrit 26.1 %       fL      MCH 31.3 pg      MCHC 31.4 g/dL      RDW 15.9 %      MPV 10.3 fL      Platelets 180 Thousands/uL      nRBC 0 /100 WBCs      Neutrophils Relative 78 %      Immat GRANS % 0 %      Lymphocytes Relative 14 %      Monocytes Relative 6 %      Eosinophils Relative 2 %      Basophils Relative 0 %      Neutrophils Absolute 5.21 Thousands/µL      Immature Grans Absolute 0.03 Thousand/uL      Lymphocytes Absolute 0.93 Thousands/µL      Monocytes Absolute 0.37 Thousand/µL      Eosinophils Absolute 0.14 Thousand/µL      Basophils Absolute 0.02 Thousands/µL                    CT abdomen pelvis wo contrast   Final Result by Anastacio Rojas MD (12/22 1421)      1.  Mild wall thickening of the left and sigmoid colon may be due to colitis.   2.  Circumferential bladder wall thickening. Correlate for evidence of cystitis.   3.  Cholelithiasis without evidence of acute cholecystitis.   4.  Multiple bilateral nonobstructing renal calculi.   5.  Sequela of  chronic pancreatitis. A 2 cm hyperdense structure in the head of the pancreas is indeterminate but in retrospect is stable since at least 2021. This may reflect a hematoma or thrombosed pseudoaneurysm as the sequela of previous    pancreatitis. Hyperdense cystic lesion is alternative possibility. Greater than 2-year stability favors benignity. A 6 to 12-month follow-up, preferably with MRI if performed, may be considered depending on comorbidities.      The study was marked in EPIC for immediate notification.         Workstation performed: [id number]               Procedures  Procedures      ED Course  ED Course as of 12/22/23 1711   Fri Dec 22, 2023   1236 Hemoglobin(!): 8.2   1236 Creatinine(!): 3.22  Above baseline approx 2, IVF ordered                             SBIRT 20yo+      Flowsheet Row Most Recent Value   Initial Alcohol Screen: US AUDIT-C     1. How often do you have a drink containing alcohol? 0 Filed at: 12/22/2023 1129   2. How many drinks containing alcohol do you have on a typical day you are drinking?  0 Filed at: 12/22/2023 1129   3a. Male UNDER 65: How often do you have five or more drinks on one occasion? 0 Filed at: 12/22/2023 1129   3b. FEMALE Any Age, or MALE 65+: How often do you have 4 or more drinks on one occassion? 0 Filed at: 12/22/2023 1129   Audit-C Score 0 Filed at: 12/22/2023 1129   MERRILL: How many times in the past year have you...    Used an illegal drug or used a prescription medication for non-medical reasons? Never Filed at: 12/22/2023 1129                  Medical Decision Making  Remained stable throughout ED course.  Given recent admission for acute blood loss anemia secondary to melena stools with discovery of acute upper gastrointestinal ulcer which was clipped by GI during that admission, there was concern for recurrence of this today.  CT abdomen/pelvis with contrast revealed a small cystic lesion of the pancreas likely secondary to chronic pancreatitis versus  pseudoaneurysm as well as inflammatory changes of the sigmoid colon and descending colon consistent with colitis.  No other acute abnormality.  Hemoglobin stable today, 8.2 from 8.3 12 days ago.  Did not require PRBC transfusion in the ED.  However secondary to recent admission for upper GI bleed and lightheadedness with x 2 melena stools for the past 24 hours, patient admitted for further management of likely acute on chronic upper GI bleed-Protonix bolus and gtt. initiated.  Also admitted for further management of acute kidney injury in the setting of chronic kidney disease.  Patient understands and agrees with plan.  All questions answered.  No other concerns.    Amount and/or Complexity of Data Reviewed  Labs: ordered. Decision-making details documented in ED Course.  Radiology: ordered.    Risk  Decision regarding hospitalization.          Disposition  Final diagnoses:   Melena   SHOAIB (acute kidney injury) (HCC)   Colitis     Time reflects when diagnosis was documented in both MDM as applicable and the Disposition within this note       Time User Action Codes Description Comment    12/22/2023 12:36 PM Son-Dionne, Shannon Add [K92.1] Melena     12/22/2023 12:36 PM Son-Dionne, Shannon Add [N17.9] SHOAIB (acute kidney injury) (HCC)     12/22/2023  2:30 PM Son-Dionne, Shannon Add [K52.9] Colitis           ED Disposition       ED Disposition   Admit    Condition   Stable    Date/Time   Fri Dec 22, 2023 1430    Comment   Case was discussed with Dr. Sharp and the patient's admission status was agreed to be Admission Status: inpatient status to the service of Dr. hSarp .               Follow-up Information    None         Patient's Medications   Discharge Prescriptions    No medications on file     No discharge procedures on file.    PDMP Review         Value Time User    PDMP Reviewed  Yes 6/17/2022 12:05 PM Brandy Faust PA-C             ED Provider  Attending physically available and evaluated Jay Roach Jr.. I managed  the patient along with the ED Attending.    Electronically Signed by           Shannon Lewis MD  12/22/23 [id number]       Shannon Lewis MD  12/22/23 [id number]

## 2023-12-22 NOTE — H&P (VIEW-ONLY)
Consultation -  [facility]  Jay Roach Jr. 78 y.o. male MRN: [id number]  Unit/Bed#: ED-20 Encounter: [id number]    ASSESSMENT/PLAN:     #1.  Reported melena, peptic ulcer disease with small fundal ulcer with pigmented spot treated on 12/5/2023.  Appears hemodynamically stable currently, no evidence of active bleeding    -Continue n.p.o. status for now    -Continue Protonix drip    -May advance to clear liquid diet later if patient continues to show no signs of active GI bleeding; in such case would still recommend making patient n.p.o. after midnight, our team will reevaluate him in the morning and we can decide about inpatient endoscopy        Inpatient consult to gastroenterology  Consult performed by: Colleen Barajas PA-C  Consult ordered by: Debby Merritt MD          Reason for Consult / Principal Problem: GI bleed    HPI: Jay Roach Jr. is a 78 y.o. year old male with history of diabetes, peripheral artery disease, coronary artery disease, severe aortic stenosis status post TAVR, chronic kidney disease, on antiplatelet therapy with Plavix and aspirin who presented to the emergency room this morning with concerns for melena; he reported having noticed an episode yesterday and then a larger episode this morning around 830.  He reported some lightheadedness yesterday.  Denied any abdominal pain, nausea vomiting, fevers or chills.  He tells me that he has not had any bowel movements since that time, still not having any abdominal pain or nausea though feels a stomach churning but attributes that to hunger.  His last oral intake was some coffee around 9 AM.  He denies use of NSAIDs or alcohol, he says he does smoke.  He tells me his last doses of Plavix and aspirin were taken yesterday.    Notably, our service had seen him during recent hospitalization earlier this month with symptomatic anemia and maroon/melanotic stool on exam; EGD and colonoscopy were both done  during that admission with colonoscopy on 12/7 having showed no obvious GI bleeding sources, his EGD on 12/5 however did show a small ulcer in the gastric fundus with pigmented spot, Mikey 2C which was treated with cauterization and clip placement.    He is currently ordered for n.p.o. status and a Protonix drip.  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  Blood pressure 123/51, heart rate 69.        REVIEW OF SYSTEMS:    CONSTITUTIONAL: Denies any fever, chills, or rigors. Good appetite, and no recent weight loss.  HEENT: No earache or tinnitus. Denies hearing loss or visual disturbances.  CARDIOVASCULAR: No chest pain or palpitations.   RESPIRATORY: Denies any cough, hemoptysis, shortness of breath or dyspnea on exertion.  GASTROINTESTINAL: As noted in the History of Present Illness.   GENITOURINARY: No problems with urination. Denies any hematuria or dysuria.  NEUROLOGIC: No dizziness or vertigo, denies headaches.   MUSCULOSKELETAL: Denies any muscle or joint pain.   SKIN: Denies skin rashes or itching.   ENDOCRINE: Denies excessive thirst. Denies intolerance to heat or cold.  PSYCHOSOCIAL: Denies depression or anxiety. Denies any recent memory loss.       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) 2/24/2023    Basal cell carcinoma     right cheek    CAD (coronary artery disease)     Carotid stenosis, asymptomatic, bilateral     Chronic kidney disease     Colon polyp     Diabetes (HCC)     type 2, non-insulin dependent    Diabetes mellitus (HCC)     GERD (gastroesophageal reflux disease)     History of nephrolithiasis     Hyperlipidemia     Hypertension     Left foot pain 11/30/2022    PAD  (peripheral artery disease) (HCC)     Severe aortic stenosis     Squamous cell skin cancer 11/22/2022    left superior helix     Past Surgical History:   Procedure Laterality Date    APPENDECTOMY      CARDIAC CATHETERIZATION N/A 05/05/2022    Procedure: CARDIAC RHC/LHC;  Surgeon: Arvin Sanchez DO;  Location: [facility];  Service: Cardiology    CARDIAC CATHETERIZATION N/A 05/05/2022    Procedure: Cardiac Coronary Angiogram;  Surgeon: Arvin Sanchez DO;  Location: [facility];  Service: Cardiology    CARDIAC CATHETERIZATION N/A 05/24/2022    Procedure: Cardiac pci;  Surgeon: Damien Jeter MD;  Location: [facility];  Service: Cardiology    CARDIAC CATHETERIZATION N/A 06/14/2022    Procedure: CARDIAC TAVR;  Surgeon: Nahum Vaz MD;  Location: [facility];  Service: Cardiology    COLECTOMY      COLONOSCOPY  2013    CORONARY ARTERY BYPASS GRAFT  2013    X 2    FEMORAL ARTERY - POPLITEAL ARTERY BYPASS GRAFT      HEMORRHOID SURGERY      IR AORTAGRAM WITH RUN-OFF  11/19/2018    IR AORTAGRAM WITH RUN-OFF  3/2/2023    IR LOWER EXTREMITY ANGIOGRAM  3/23/2023    MOHS SURGERY Left 01/11/2023    SCC left superior helix-Dr Tovar    AR SLCTV CATHJ 3RD+ ORD SLCTV ABDL PEL/LXTR BRNCH Left 08/12/2016    Procedure: LEFT FEMORAL ARTERIOGRAM; BALLOON ANGIOPLASTY; SFA  AND FEMORAL AT VEIN GRAFT;  Surgeon: Nicholas Urena MD;  Location: [facility];  Service: Vascular    AR TEAEC W/WO PATCH GRAFT COMMON FEMORAL Right 3/23/2023    Procedure: Common femoral endarterectomy&antegrade intervention of SFA, popliteal artery w/ shockwave;  Surgeon: Eagle Higuera MD;  Location: [facility];  Service: Vascular    AR TRANSCATHETER TRANSAPICAL REPLACEMT AORTIC VALVE N/A 06/14/2022    Procedure: REPLACEMENT AORTIC VALVE TRANSCATHETER (TAVR) TRANSAPICAL 29MM IRVIN KYLER S3 ULTRA VALVE(ACCESS ON LEFT) ELANA;  Surgeon: Amarjit Gordon DO;  Location: [facility];  Service: Cardiac Surgery    SKIN CANCER  EXCISION      TONSILLECTOMY AND ADENOIDECTOMY       Social History   Social History     Substance and Sexual Activity   Alcohol Use Not Currently    Comment: rare     Social History     Substance and Sexual Activity   Drug Use No     Social History     Tobacco Use   Smoking Status Every Day    Current packs/day: 0.25    Average packs/day: 0.3 packs/day for 50.0 years (12.5 ttl pk-yrs)    Types: Cigarettes    Passive exposure: Current   Smokeless Tobacco Never     Family History   Problem Relation Age of Onset    Heart attack Father     Other Sister         bypass and vlave replacement    Stroke Paternal Uncle     Arrhythmia Neg Hx     Asthma Neg Hx     Clotting disorder Neg Hx     Fainting Neg Hx     Anuerysm Neg Hx     Hypertension Neg Hx         unsure     Hyperlipidemia Neg Hx     Heart failure Neg Hx        Meds/Allergies     (Not in a hospital admission)    Current Facility-Administered Medications   Medication Dose Route Frequency    allopurinol (ZYLOPRIM) tablet 300 mg  300 mg Oral Daily    amLODIPine (NORVASC) tablet 10 mg  10 mg Oral Daily    atorvastatin (LIPITOR) tablet 80 mg  80 mg Oral Daily With Dinner    calcitriol (ROCALTROL) capsule 0.25 mcg  0.25 mcg Oral Daily    metoprolol succinate (TOPROL-XL) 24 hr tablet 12.5 mg  12.5 mg Oral Daily    nicotine (NICODERM CQ) 14 mg/24hr TD 24 hr patch 1 patch  1 patch Transdermal Daily    pancrelipase (Lip-Prot-Amyl) (CREON) delayed release capsule 24,000 Units  24,000 Units Oral TID With Meals    pantoprazole (PROTONIX) 80 mg in sodium chloride 0.9 % 100 mL infusion  8 mg/hr Intravenous Continuous       No Known Allergies        Objective     Blood pressure 123/51, pulse 62, temperature 97.8 °F (36.6 °C), temperature source Oral, resp. rate 18, SpO2 99%.      Intake/Output Summary (Last 24 hours) at 12/22/2023 1547  Last data filed at 12/22/2023 1533  Gross per 24 hour   Intake 1000 ml   Output --   Net 1000 ml         PHYSICAL EXAM     General Appearance:    Alert, cooperative, no distress, appears stated age    HEENT:   Normocephalic, atraumatic, anicteric.     Neck:  Supple, symmetrical, trachea midline, no adenopathy;    thyroid: no enlargement/tenderness/nodules; no carotid  bruit or JVD    Lungs:   Clear to auscultation bilaterally; no rales, rhonchi or wheezing; respirations unlabored    Heart::   S1 and S2 normal; regular rate and rhythm; no murmur, rub, or gallop.   Abdomen:   Soft, non-tender, non-distended; normal bowel sounds; no masses, no organomegaly    Genitalia:   Deferred    Rectal:   Deferred    Extremities:  No cyanosis, clubbing or edema    Pulses:  2+ and symmetric all extremities    Skin:  Skin color, texture, turgor normal, no rashes or lesions    Lymph nodes:  No palpable cervical, axillary or inguinal lymphadenopathy        Lab Results:   Admission on 12/22/2023   Component Date Value    WBC 12/22/2023 6.70     RBC 12/22/2023 2.62 (L)     Hemoglobin 12/22/2023 8.2 (L)     Hematocrit 12/22/2023 26.1 (L)     MCV 12/22/2023 100 (H)     MCH 12/22/2023 31.3     MCHC 12/22/2023 31.4     RDW 12/22/2023 15.9 (H)     MPV 12/22/2023 10.3     Platelets 12/22/2023 180     nRBC 12/22/2023 0     Neutrophils Relative 12/22/2023 78 (H)     Immat GRANS % 12/22/2023 0     Lymphocytes Relative 12/22/2023 14     Monocytes Relative 12/22/2023 6     Eosinophils Relative 12/22/2023 2     Basophils Relative 12/22/2023 0     Neutrophils Absolute 12/22/2023 5.21     Immature Grans Absolute 12/22/2023 0.03     Lymphocytes Absolute 12/22/2023 0.93     Monocytes Absolute 12/22/2023 0.37     Eosinophils Absolute 12/22/2023 0.14     Basophils Absolute 12/22/2023 0.02     Sodium 12/22/2023 141     Potassium 12/22/2023 4.2     Chloride 12/22/2023 109 (H)     CO2 12/22/2023 24     ANION GAP 12/22/2023 8     BUN 12/22/2023 67 (H)     Creatinine 12/22/2023 3.22 (H)     Glucose 12/22/2023 139     Calcium 12/22/2023 9.0     AST 12/22/2023 10 (L)     ALT 12/22/2023 6 (L)     Alkaline  Phosphatase 12/22/2023 62     Total Protein 12/22/2023 6.7     Albumin 12/22/2023 3.9     Total Bilirubin 12/22/2023 0.35     eGFR 12/22/2023 17     Lipase 12/22/2023 8 (L)     LACTIC ACID 12/22/2023 1.3     Protime 12/22/2023 13.4     INR 12/22/2023 0.96     PTT 12/22/2023 26     ABO Grouping 12/22/2023 A     Rh Factor 12/22/2023 Positive     Antibody Screen 12/22/2023 Negative     Specimen Expiration Date 12/22/2023 20231225     Magnesium 12/22/2023 1.9        Imaging Studies: I have personally reviewed pertinent reports.                  The patient was seen and examined by Dr. Ratliff, all mosley medical decisions were made with Dr. Ratliff.  Thank you for allowing us to participate in the care of this pleasant patient.  We will follow up with you closely.

## 2023-12-22 NOTE — ASSESSMENT & PLAN NOTE
Blood Pressure: 123/51    Plan:  Continue amlodipine and metoprolol  Medications held: Lisinopril and torsemide  Monitor blood pressure  PRN IV Labetalol  for SBP > 180

## 2023-12-22 NOTE — PLAN OF CARE
Problem: PAIN - ADULT  Goal: Verbalizes/displays adequate comfort level or baseline comfort level  Description: Interventions:  - Encourage patient to monitor pain and request assistance  - Assess pain using appropriate pain scale  - Administer analgesics based on type and severity of pain and evaluate response  - Implement non-pharmacological measures as appropriate and evaluate response  - Consider cultural and social influences on pain and pain management  - Notify physician/advanced practitioner if interventions unsuccessful or patient reports new pain  Outcome: Progressing     Problem: INFECTION - ADULT  Goal: Absence or prevention of progression during hospitalization  Description: INTERVENTIONS:  - Assess and monitor for signs and symptoms of infection  - Monitor lab/diagnostic results  - Monitor all insertion sites, i.e. indwelling lines, tubes, and drains  - Monitor endotracheal if appropriate and nasal secretions for changes in amount and color  - [place] appropriate cooling/warming therapies per order  - Administer medications as ordered  - Instruct and encourage patient and family to use good hand hygiene technique  - Identify and instruct in appropriate isolation precautions for identified infection/condition  Outcome: Progressing  Goal: Absence of fever/infection during neutropenic period  Description: INTERVENTIONS:  - Monitor WBC    Outcome: Progressing     Problem: SAFETY ADULT  Goal: Patient will remain free of falls  Description: INTERVENTIONS:  - Educate patient/family on patient safety including physical limitations  - Instruct patient to call for assistance with activity   - Consult OT/PT to assist with strengthening/mobility   - Keep Call bell within reach  - Keep bed low and locked with side rails adjusted as appropriate  - Keep care items and personal belongings within reach  - Initiate and maintain comfort rounds  - Make Fall Risk Sign visible to staff  - Offer Toileting every  Hours,  in advance of need  - Initiate/Maintain alarm  - Obtain necessary fall risk management equipment:   - Apply yellow socks and bracelet for high fall risk patients  - Consider moving patient to room near nurses station  Outcome: Progressing  Goal: Maintain or return to baseline ADL function  Description: INTERVENTIONS:  -  Assess patient's ability to carry out ADLs; assess patient's baseline for ADL function and identify physical deficits which impact ability to perform ADLs (bathing, care of mouth/teeth, toileting, grooming, dressing, etc.)  - Assess/evaluate cause of self-care deficits   - Assess range of motion  - Assess patient's mobility; develop plan if impaired  - Assess patient's need for assistive devices and provide as appropriate  - Encourage maximum independence but intervene and supervise when necessary  - Involve family in performance of ADLs  - Assess for home care needs following discharge   - Consider OT consult to assist with ADL evaluation and planning for discharge  - Provide patient education as appropriate  Outcome: Progressing  Goal: Maintains/Returns to pre admission functional level  Description: INTERVENTIONS:  - Perform AM-PAC 6 Click Basic Mobility/ Daily Activity assessment daily.  - Set and communicate daily mobility goal to care team and patient/family/caregiver.   - Collaborate with rehabilitation services on mobility goals if consulted  - Perform Range of Motion  times a day.  - Reposition patient every  hours.  - Dangle patient  times a day  - Stand patient  times a day  - Ambulate patient  times a day  - Out of bed to chair  times a day   - Out of bed for meal times a day  - Out of bed for toileting  - Record patient progress and toleration of activity level   Outcome: Progressing     Problem: DISCHARGE PLANNING  Goal: Discharge to home or other facility with appropriate resources  Description: INTERVENTIONS:  - Identify barriers to discharge w/patient and caregiver  - Arrange for  needed discharge resources and transportation as appropriate  - Identify discharge learning needs (meds, wound care, etc.)  - Arrange for interpretive services to assist at discharge as needed  - Refer to Case Management Department for coordinating discharge planning if the patient needs post-hospital services based on physician/advanced practitioner order or complex needs related to functional status, cognitive ability, or social support system  Outcome: Progressing     Problem: Knowledge Deficit  Goal: Patient/family/caregiver demonstrates understanding of disease process, treatment plan, medications, and discharge instructions  Description: Complete learning assessment and assess knowledge base.  Interventions:  - Provide teaching at level of understanding  - Provide teaching via preferred learning methods  Outcome: Progressing

## 2023-12-22 NOTE — ASSESSMENT & PLAN NOTE
Lab Results   Component Value Date    EGFR 17 12/22/2023    EGFR 29 12/10/2023    EGFR 28 12/09/2023    CREATININE 3.22 (H) 12/22/2023    CREATININE 2.05 (H) 12/10/2023    CREATININE 2.11 (H) 12/09/2023     Patient has a history of hyper parathyroidism and has been taking calcitriol 0.25 mcg daily followed by vitamin D3 50,000 units once a week.  Etiology of elevated creatinine: History of CKD superimposed with Pre renal SHOAIB due to poor oral intake vs lisinopril vs Jardiance Vs Torsemide.    Plan:  Hold lisinopril and  Jardiance  and Torsemide.  IV Plasmalyte 75 ml/hr

## 2023-12-22 NOTE — H&P
"[facility]  H&P  Name: Jay Roach Jr. 78 y.o. male I MRN: [id number]  Unit/Bed#: ED-20 I Date of Admission: 12/22/2023   Date of Service: 12/22/2023 I Hospital Day: 0      Assessment/Plan   Type 2 diabetes mellitus (HCC)  Assessment & Plan  Lab Results   Component Value Date    HGBA1C 6.1 (H) 12/09/2023       No results for input(s): \"POCGLU\" in the last 72 hours.    Blood Sugar Average: Last 72 hrs:  Hold Jardiance, glimepiride, glipizide.    Plan:  Siding scale for now      * Melena  Assessment & Plan  Patient presented with new onset rectal bleeding, started after constipation.  He noticed splash of bright red blood, followed by dark black stools.  Previous admission on 12/5 with similar symptoms and underwent EGD  EGD 12/5/2023 showed ulcer which was clipped  C scope 12/5 showed diverticulosis only   CT abdomen 12/22/2023:    Mild wall thickening of the left and sigmoid colon may be due to colitis.  Sequela of chronic pancreatitis. A 2 cm hyperdense structure in the head of the pancreas is indeterminate but in retrospect is stable since at least 2021. This may reflect a hematoma or thrombosed pseudoaneurysm as the sequela of previous pancreatitis. Hyperdense cystic lesion is alternative possibility. Greater than 2-year stability favors benignity. A 6 to 12-month follow-up, preferably with MRI if performed, may be considered depending on comorbidities.\    Plan:  Received 1 L of NaCl ED, continue with the maintenance fluids 0.9% NaCl @75 mL/h  Received 80 mg of IV pantoprazole in the ED, consulted gastroenterology on-call.  Will start him on IV pantoprazole infusion.  Hold aspirin and Plavix and DVT prophylaxis.    Chronic pancreatitis (HCC)  Assessment & Plan  Has been taking Creon 3 times daily with foods.  At baseline patient has 3-4 bowel movements per day, denies lactose intolerance.  Usual diet-salads, spaghetti, fiber, with consumption of 20 ounces of water/day  Denies " abdominal pain, abdominal distention, bloating, weight loss    12/22/2023 CT abdomen finding: Sequela of chronic pancreatitis. A 2 cm hyperdense structure in the head of the pancreas is indeterminate but in retrospect is stable since at least 2021. This may reflect a hematoma or thrombosed pseudoaneurysm as the sequela of previous pancreatitis. Hyperdense cystic lesion is alternative possibility. Greater than 2-year stability favors benignity. A 6 to 12-month follow-up, preferably with MRI if performed, may be considered depending on comorbidities.\     Plan:  Diabetic clear liquid diet for now.  Continue Creon      Acute kidney injury superimposed on CKD   Assessment & Plan  Lab Results   Component Value Date    EGFR 17 12/22/2023    EGFR 29 12/10/2023    EGFR 28 12/09/2023    CREATININE 3.22 (H) 12/22/2023    CREATININE 2.05 (H) 12/10/2023    CREATININE 2.11 (H) 12/09/2023     Patient has a history of hyper parathyroidism and has been taking calcitriol 0.25 mcg daily followed by vitamin D3 50,000 units once a week.  Etiology of elevated creatinine: History of CKD superimposed with Pre renal SHOAIB due to poor oral intake vs lisinopril vs Jardiance Vs Torsemide.    Plan:  Hold lisinopril and  Jardiance  and Torsemide.  IV Plasmalyte 75 ml/hr    Iron deficiency anemia  Assessment & Plan  Recent Labs     12/22/23  1157   HGB 8.2*     Plan:  Consent Obtained.  Blood transfusion if HB<7    Hypertension  Assessment & Plan  Blood Pressure: 123/51    Plan:  Continue amlodipine and metoprolol  Medications held: Lisinopril and torsemide  Monitor blood pressure  PRN IV Labetalol  for SBP > 180        VTE Pharmacologic Prophylaxis:   High Risk (Score >/= 5) - Pharmacological DVT Prophylaxis Contraindicated. Sequential Compression Devices Ordered.  Code Status: Level 3 - DNAR and DNI   Discussion with family: Attempted to update  (son) via phone. Unable to contact.    Anticipated Length of Stay: Patient will be  admitted on an observation basis with an anticipated length of stay of less than 2 midnights secondary to Melena.    Chief Complaint: Melena    History of Present Illness:  Jay Roach Jr. is a 78 y.o. male with past medical history of CAD on Plavix and aspirin, CKD, diabetes, hypertension presents for evaluation of melena that was first noticed yesterday and then had a larger episode this morning around 830.  Patient reports he started becoming lightheaded yesterday however has been ambulating without difficulty.  This morning around 830, was on his way to the bathroom when he suddenly had a large amount of melena stool in his underwear.     He was admitted on 12/4/2023 and discharged on 12/10/2023 with similar symptoms, at the time he became severely anemic due to melena stools and received PRBC transfusion, on EGD he has an ulcer at fundus which was clipped.  In the ED his hemoglobin, 8.2 and creatinine 3.22 with BUN 67.  CT abdomen showed sigmoid colon diverticulitis(history of diverticulosis), as well as chronic pancreatitis reported as hypertensive STIC lesion possibly a hematoma or thrombosed pseudoaneurysm.  He received IV Protonix milligrams and 1 L of normal saline.  Consulted nephrology on-call, okay to start him on maintenance IV fluids and to hold diuretics for now.  Consulted GI will follow-up with recommendations.  Patient will be admitted for melena evaluation.    Review of Systems:  Review of Systems   Constitutional:  Positive for chills.   HENT: Negative.     Eyes: Negative.    Respiratory: Negative.     Cardiovascular: Negative.    Gastrointestinal:  Positive for blood in stool, constipation and nausea. Negative for rectal pain and vomiting.   Endocrine: Negative.    Genitourinary: Negative.    Musculoskeletal: Negative.    Skin: Negative.    Allergic/Immunologic: Negative.    Neurological: Negative.    Hematological: Negative.    Psychiatric/Behavioral: Negative.         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 (HCC) 2/24/2023    Basal cell carcinoma     right cheek    CAD (coronary artery disease)     Carotid stenosis, asymptomatic, bilateral     Chronic kidney disease     Colon polyp     Diabetes (HCC)     type 2, non-insulin dependent    Diabetes mellitus (HCC)     GERD (gastroesophageal reflux disease)     History of nephrolithiasis     Hyperlipidemia     Hypertension     Left foot pain 11/30/2022    PAD (peripheral artery disease) ([facility])     Severe aortic stenosis     Squamous cell skin cancer 11/22/2022    left superior helix       Past Surgical History:   Procedure Laterality Date    APPENDECTOMY      CARDIAC CATHETERIZATION N/A 05/05/2022    Procedure: CARDIAC RHC/LHC;  Surgeon: Arvin Sanchez DO;  Location: [facility];  Service: Cardiology    CARDIAC CATHETERIZATION N/A 05/05/2022    Procedure: Cardiac Coronary Angiogram;  Surgeon: Arvin Sanchez DO;  Location: [facility];  Service: Cardiology    CARDIAC CATHETERIZATION N/A 05/24/2022    Procedure: Cardiac pci;  Surgeon: Damien Jeter MD;  Location: [facility];  Service: Cardiology    CARDIAC CATHETERIZATION N/A 06/14/2022    Procedure: CARDIAC TAVR;  Surgeon: Nahum Vaz MD;  Location: [facility];  Service: Cardiology    COLECTOMY      COLONOSCOPY  2013    CORONARY ARTERY BYPASS GRAFT  2013    X 2    FEMORAL ARTERY - POPLITEAL ARTERY BYPASS GRAFT      HEMORRHOID SURGERY      IR AORTAGRAM WITH RUN-OFF  11/19/2018    IR AORTAGRAM WITH RUN-OFF  3/2/2023    IR LOWER EXTREMITY ANGIOGRAM  3/23/2023    MOHS SURGERY Left 01/11/2023    SCC left superior helix-Dr Tovar    [facility] SLCTV CATHJ 3RD+ ORD SLCTV ABDL PEL/LXTR BRNCH Left 08/12/2016    Procedure: LEFT FEMORAL ARTERIOGRAM;  BALLOON ANGIOPLASTY; SFA  AND FEMORAL AT VEIN GRAFT;  Surgeon: Nicholas Urena MD;  Location: [facility];  Service: Vascular    VA TEAEC W/WO PATCH GRAFT COMMON FEMORAL Right 3/23/2023    Procedure: Common femoral endarterectomy&antegrade intervention of SFA, popliteal artery w/ shockwave;  Surgeon: Eagle Higuera MD;  Location: [facility];  Service: Vascular    VA TRANSCATHETER TRANSAPICAL REPLACEMT AORTIC VALVE N/A 06/14/2022    Procedure: REPLACEMENT AORTIC VALVE TRANSCATHETER (TAVR) TRANSAPICAL 29MM IRVIN KYLER S3 ULTRA VALVE(ACCESS ON LEFT) ELANA;  Surgeon: Amarjit Gordon DO;  Location: [facility];  Service: Cardiac Surgery    SKIN CANCER EXCISION      TONSILLECTOMY AND ADENOIDECTOMY         Meds/Allergies:  Prior to Admission medications    Medication Sig Start Date End Date Taking? Authorizing Provider   allopurinol (ZYLOPRIM) 300 mg tablet TAKE 1 TABLET DAILY 11/16/23   Simón Hadley MD   amLODIPine (NORVASC) 10 mg tablet TAKE 1 TABLET DAILY 11/27/23   Israel Sanchez MD   aspirin (ECOTRIN LOW STRENGTH) 81 mg EC tablet Take 81 mg by mouth daily    Historical Provider, MD   atorvastatin (LIPITOR) 80 mg tablet TAKE 1 TABLET DAILY AT     BEDTIME 2/22/23   Israel Sanchez MD   calcitriol (ROCALTROL) 0.25 mcg capsule Take 1 capsule (0.25 mcg total) by mouth daily 9/5/23   Marcel Muñoz MD   clopidogrel (PLAVIX) 75 mg tablet Take 1 tablet (75 mg total) by mouth daily 8/15/23   Israel Sanchez MD   D3-50 1.25 MG (19792 UT) capsule TAKE 1 CAPSULE ONCE WEEKLY 11/16/23   Marcel Muñoz MD   Empagliflozin (Jardiance) 10 MG TABS tablet Take 1 tablet (10 mg total) by mouth in the morning 9/26/23   Marcel Muñoz MD   glimepiride (AMARYL) 1 mg tablet Take 1 tablet (1 mg total) by mouth daily with breakfast 11/1/23 4/29/24  Simón Hadley MD   glipiZIDE (GLUCOTROL) 5 mg tablet TAKE 1 TABLET DAILY WITH   BREAKFAST 11/16/23   Simón Hadley MD   lisinopril (ZESTRIL) 20 mg tablet Take 1 tablet (20 mg total) by  mouth daily 12/15/23   Simón Hadley MD   metoprolol succinate (TOPROL-XL) 25 mg 24 hr tablet Take 0.5 tablets (12.5 mg total) by mouth daily 8/15/23   Israel Sanchez MD   pancrelipase, Lip-Prot-Amyl, (CREON) 24,000 units Take 24,000 units of lipase by mouth 3 (three) times a day with meals 10/24/23   Flores Gonzalez PA-C   pantoprazole (PROTONIX) 40 mg tablet Take 1 tablet (40 mg total) by mouth 2 (two) times a day 12/10/23   Amarilis Tom MD   torsemide (DEMADEX) 20 mg tablet TAKE 0.5 TABLETS (10 MG TOTAL) BY MOUTH DAILY TAKES 10 MG ONCE A DAY. 11/7/23   Israel Sanchez MD   Cyanocobalamin (VITAMIN B12 PO) Take by mouth once a week  Patient not taking: Reported on 6/13/2023 12/22/23  Historical Provider, MD   Magnesium Oxide (MAG-200 PO) Take by mouth once a week  Patient not taking: Reported on 6/13/2023 12/22/23  Historical Provider, MD     I have reviewed home medications with patient personally.    Allergies: No Known Allergies    Social History:  Marital Status:    Occupation:   Patient Pre-hospital Living Situation: Home  Patient Pre-hospital Level of Mobility: walks  Patient Pre-hospital Diet Restrictions:   Substance Use History:   Social History     Substance and Sexual Activity   Alcohol Use Not Currently    Comment: rare     Social History     Tobacco Use   Smoking Status Every Day    Current packs/day: 0.25    Average packs/day: 0.3 packs/day for 50.0 years (12.5 ttl pk-yrs)    Types: Cigarettes    Passive exposure: Current   Smokeless Tobacco Never     Social History     Substance and Sexual Activity   Drug Use No       Family History:  Family History   Problem Relation Age of Onset    Heart attack Father     Other Sister         bypass and vlave replacement    Stroke Paternal Uncle     Arrhythmia Neg Hx     Asthma Neg Hx     Clotting disorder Neg Hx     Fainting Neg Hx     Anuerysm Neg Hx     Hypertension Neg Hx         unsure     Hyperlipidemia Neg Hx     Heart failure Neg Hx         Physical Exam:     Vitals:   Blood Pressure: 123/51 (12/22/23 1415)  Pulse: 62 (12/22/23 1415)  Temperature: 97.8 °F (36.6 °C) (12/22/23 1130)  Temp Source: Oral (12/22/23 1130)  Respirations: 18 (12/22/23 1415)  SpO2: 99 % (12/22/23 1415)    Physical Exam  Vitals reviewed.   HENT:      Head: Normocephalic.      Nose: Nose normal.   Eyes:      Extraocular Movements: Extraocular movements intact.      Conjunctiva/sclera: Conjunctivae normal.   Cardiovascular:      Rate and Rhythm: Normal rate.      Pulses: Normal pulses.      Heart sounds: Murmur heard.   Pulmonary:      Effort: Pulmonary effort is normal.   Abdominal:      General: Abdomen is flat. Bowel sounds are normal.      Palpations: Abdomen is soft.      Tenderness: There is no abdominal tenderness. There is no right CVA tenderness or left CVA tenderness.   Musculoskeletal:         General: No swelling. Normal range of motion.      Cervical back: Normal range of motion.   Skin:     General: Skin is warm.   Neurological:      General: No focal deficit present.          Additional Data:     Lab Results:  Results from last 7 days   Lab Units 12/22/23  1157   WBC Thousand/uL 6.70   HEMOGLOBIN g/dL 8.2*   HEMATOCRIT % 26.1*   PLATELETS Thousands/uL 180   NEUTROS PCT % 78*   LYMPHS PCT % 14   MONOS PCT % 6   EOS PCT % 2     Results from last 7 days   Lab Units 12/22/23  1157   SODIUM mmol/L 141   POTASSIUM mmol/L 4.2   CHLORIDE mmol/L 109*   CO2 mmol/L 24   BUN mg/dL 67*   CREATININE mg/dL 3.22*   ANION GAP mmol/L 8   CALCIUM mg/dL 9.0   ALBUMIN g/dL 3.9   TOTAL BILIRUBIN mg/dL 0.35   ALK PHOS U/L 62   ALT U/L 6*   AST U/L 10*   GLUCOSE RANDOM mg/dL 139     Results from last 7 days   Lab Units 12/22/23  1157   INR  0.96             Results from last 7 days   Lab Units 12/22/23  1157   LACTIC ACID mmol/L 1.3       Lines/Drains:  Invasive Devices       Peripheral Intravenous Line  Duration             Peripheral IV 12/22/23 Left Antecubital <1 day    Peripheral  IV 12/22/23 Right;Ventral (anterior) Forearm <1 day                        Imaging: Reviewed radiology reports from this admission including: abdominal/pelvic CT  CT abdomen pelvis wo contrast   Final Result by Anastacio Rojas MD (12/22 1421)      1.  Mild wall thickening of the left and sigmoid colon may be due to colitis.   2.  Circumferential bladder wall thickening. Correlate for evidence of cystitis.   3.  Cholelithiasis without evidence of acute cholecystitis.   4.  Multiple bilateral nonobstructing renal calculi.   5.  Sequela of chronic pancreatitis. A 2 cm hyperdense structure in the head of the pancreas is indeterminate but in retrospect is stable since at least 2021. This may reflect a hematoma or thrombosed pseudoaneurysm as the sequela of previous    pancreatitis. Hyperdense cystic lesion is alternative possibility. Greater than 2-year stability favors benignity. A 6 to 12-month follow-up, preferably with MRI if performed, may be considered depending on comorbidities.      The study was marked in EPIC for immediate notification.         Workstation performed: [id number]             EKG and Other Studies Reviewed on Admission:   EKG: No EKG obtained.    ** Please Note: This note has been constructed using a voice recognition system. **

## 2023-12-22 NOTE — ASSESSMENT & PLAN NOTE
"Lab Results   Component Value Date    HGBA1C 6.1 (H) 12/09/2023       No results for input(s): \"POCGLU\" in the last 72 hours.    Blood Sugar Average: Last 72 hrs:  Hold Jardiance, glimepiride, glipizide.    Plan:  Siding scale for now    "

## 2023-12-22 NOTE — CONSULTS
Consultation -  [facility]  Jay Roach Jr. 78 y.o. male MRN: [id number]  Unit/Bed#: ED-20 Encounter: [id number]    ASSESSMENT/PLAN:     #1.  Reported melena, peptic ulcer disease with small fundal ulcer with pigmented spot treated on 12/5/2023.  Appears hemodynamically stable currently, no evidence of active bleeding    -Continue n.p.o. status for now    -Continue Protonix drip    -May advance to clear liquid diet later if patient continues to show no signs of active GI bleeding; in such case would still recommend making patient n.p.o. after midnight, our team will reevaluate him in the morning and we can decide about inpatient endoscopy        Inpatient consult to gastroenterology  Consult performed by: Colleen Barajas PA-C  Consult ordered by: Debby Merritt MD          Reason for Consult / Principal Problem: GI bleed    HPI: Jay Roach Jr. is a 78 y.o. year old male with history of diabetes, peripheral artery disease, coronary artery disease, severe aortic stenosis status post TAVR, chronic kidney disease, on antiplatelet therapy with Plavix and aspirin who presented to the emergency room this morning with concerns for melena; he reported having noticed an episode yesterday and then a larger episode this morning around 830.  He reported some lightheadedness yesterday.  Denied any abdominal pain, nausea vomiting, fevers or chills.  He tells me that he has not had any bowel movements since that time, still not having any abdominal pain or nausea though feels a stomach churning but attributes that to hunger.  His last oral intake was some coffee around 9 AM.  He denies use of NSAIDs or alcohol, he says he does smoke.  He tells me his last doses of Plavix and aspirin were taken yesterday.    Notably, our service had seen him during recent hospitalization earlier this month with symptomatic anemia and maroon/melanotic stool on exam; EGD and colonoscopy were both done  during that admission with colonoscopy on 12/7 having showed no obvious GI bleeding sources, his EGD on 12/5 however did show a small ulcer in the gastric fundus with pigmented spot, Mikey 2C which was treated with cauterization and clip placement.    He is currently ordered for n.p.o. status and a Protonix drip.  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  Blood pressure 123/51, heart rate 69.        REVIEW OF SYSTEMS:    CONSTITUTIONAL: Denies any fever, chills, or rigors. Good appetite, and no recent weight loss.  HEENT: No earache or tinnitus. Denies hearing loss or visual disturbances.  CARDIOVASCULAR: No chest pain or palpitations.   RESPIRATORY: Denies any cough, hemoptysis, shortness of breath or dyspnea on exertion.  GASTROINTESTINAL: As noted in the History of Present Illness.   GENITOURINARY: No problems with urination. Denies any hematuria or dysuria.  NEUROLOGIC: No dizziness or vertigo, denies headaches.   MUSCULOSKELETAL: Denies any muscle or joint pain.   SKIN: Denies skin rashes or itching.   ENDOCRINE: Denies excessive thirst. Denies intolerance to heat or cold.  PSYCHOSOCIAL: Denies depression or anxiety. Denies any recent memory loss.       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) 2/24/2023    Basal cell carcinoma     right cheek    CAD (coronary artery disease)     Carotid stenosis, asymptomatic, bilateral     Chronic kidney disease     Colon polyp     Diabetes (HCC)     type 2, non-insulin dependent    Diabetes mellitus (HCC)     GERD (gastroesophageal reflux disease)     History of nephrolithiasis     Hyperlipidemia     Hypertension     Left foot pain 11/30/2022    PAD  (peripheral artery disease) (HCC)     Severe aortic stenosis     Squamous cell skin cancer 11/22/2022    left superior helix     Past Surgical History:   Procedure Laterality Date    APPENDECTOMY      CARDIAC CATHETERIZATION N/A 05/05/2022    Procedure: CARDIAC RHC/LHC;  Surgeon: Arvin Sanchez DO;  [facility];  Service: Cardiology    CARDIAC CATHETERIZATION N/A 05/05/2022    Procedure: Cardiac Coronary Angiogram;  Surgeon: Arvin Sanchez DO;  [facility];  Service: Cardiology    CARDIAC CATHETERIZATION N/A 05/24/2022    Procedure: Cardiac pci;  Surgeon: Damien Jeter MD;  [facility];  Service: Cardiology    CARDIAC CATHETERIZATION N/A 06/14/2022    Procedure: CARDIAC TAVR;  Surgeon: Nahum Vaz MD;  [facility];  Service: Cardiology    COLECTOMY      COLONOSCOPY  2013    CORONARY ARTERY BYPASS GRAFT  2013    X 2    FEMORAL ARTERY - POPLITEAL ARTERY BYPASS GRAFT      HEMORRHOID SURGERY      IR AORTAGRAM WITH RUN-OFF  11/19/2018    IR AORTAGRAM WITH RUN-OFF  3/2/2023    IR LOWER EXTREMITY ANGIOGRAM  3/23/2023    MOHS SURGERY Left 01/11/2023    SCC left superior helix-Dr Tovar    SD SLCTV CATHJ 3RD+ ORD SLCTV ABDL PEL/LXTR BRNCH Left 08/12/2016    Procedure: LEFT FEMORAL ARTERIOGRAM; BALLOON ANGIOPLASTY; SFA  AND FEMORAL AT VEIN GRAFT;  Surgeon: Nicholas Urena MD;  [facility];  Service: Vascular    SD TEAEC W/WO PATCH GRAFT COMMON FEMORAL Right 3/23/2023    Procedure: Common femoral endarterectomy&antegrade intervention of SFA, popliteal artery w/ shockwave;  Surgeon: Eagle Higuera MD;  [facility];  Service: Vascular    SD TRANSCATHETER TRANSAPICAL REPLACEMT AORTIC VALVE N/A 06/14/2022    Procedure: REPLACEMENT AORTIC VALVE TRANSCATHETER (TAVR) TRANSAPICAL 29MM IRVIN KYLER S3 ULTRA VALVE(ACCESS ON LEFT) ELANA;  Surgeon: Amarjit Gordon DO;  [facility];  Service: Cardiac Surgery    SKIN CANCER  EXCISION      TONSILLECTOMY AND ADENOIDECTOMY       Social History   Social History     Substance and Sexual Activity   Alcohol Use Not Currently    Comment: rare     Social History     Substance and Sexual Activity   Drug Use No     Social History     Tobacco Use   Smoking Status Every Day    Current packs/day: 0.25    Average packs/day: 0.3 packs/day for 50.0 years (12.5 ttl pk-yrs)    Types: Cigarettes    Passive exposure: Current   Smokeless Tobacco Never     Family History   Problem Relation Age of Onset    Heart attack Father     Other Sister         bypass and vlave replacement    Stroke Paternal Uncle     Arrhythmia Neg Hx     Asthma Neg Hx     Clotting disorder Neg Hx     Fainting Neg Hx     Anuerysm Neg Hx     Hypertension Neg Hx         unsure     Hyperlipidemia Neg Hx     Heart failure Neg Hx        Meds/Allergies     (Not in a hospital admission)    Current Facility-Administered Medications   Medication Dose Route Frequency    allopurinol (ZYLOPRIM) tablet 300 mg  300 mg Oral Daily    amLODIPine (NORVASC) tablet 10 mg  10 mg Oral Daily    atorvastatin (LIPITOR) tablet 80 mg  80 mg Oral Daily With Dinner    calcitriol (ROCALTROL) capsule 0.25 mcg  0.25 mcg Oral Daily    metoprolol succinate (TOPROL-XL) 24 hr tablet 12.5 mg  12.5 mg Oral Daily    nicotine (NICODERM CQ) 14 mg/24hr TD 24 hr patch 1 patch  1 patch Transdermal Daily    pancrelipase (Lip-Prot-Amyl) (CREON) delayed release capsule 24,000 Units  24,000 Units Oral TID With Meals    pantoprazole (PROTONIX) 80 mg in sodium chloride 0.9 % 100 mL infusion  8 mg/hr Intravenous Continuous       No Known Allergies        Objective     Blood pressure 123/51, pulse 62, temperature 97.8 °F (36.6 °C), temperature source Oral, resp. rate 18, SpO2 99%.      Intake/Output Summary (Last 24 hours) at 12/22/2023 1547  Last data filed at 12/22/2023 1533  Gross per 24 hour   Intake 1000 ml   Output --   Net 1000 ml         PHYSICAL EXAM     General Appearance:    Alert, cooperative, no distress, appears stated age    HEENT:   Normocephalic, atraumatic, anicteric.     Neck:  Supple, symmetrical, trachea midline, no adenopathy;    thyroid: no enlargement/tenderness/nodules; no carotid  bruit or JVD    Lungs:   Clear to auscultation bilaterally; no rales, rhonchi or wheezing; respirations unlabored    Heart::   S1 and S2 normal; regular rate and rhythm; no murmur, rub, or gallop.   Abdomen:   Soft, non-tender, non-distended; normal bowel sounds; no masses, no organomegaly    Genitalia:   Deferred    Rectal:   Deferred    Extremities:  No cyanosis, clubbing or edema    Pulses:  2+ and symmetric all extremities    Skin:  Skin color, texture, turgor normal, no rashes or lesions    Lymph nodes:  No palpable cervical, axillary or inguinal lymphadenopathy        Lab Results:   Admission on 12/22/2023   Component Date Value    WBC 12/22/2023 6.70     RBC 12/22/2023 2.62 (L)     Hemoglobin 12/22/2023 8.2 (L)     Hematocrit 12/22/2023 26.1 (L)     MCV 12/22/2023 100 (H)     MCH 12/22/2023 31.3     MCHC 12/22/2023 31.4     RDW 12/22/2023 15.9 (H)     MPV 12/22/2023 10.3     Platelets 12/22/2023 180     nRBC 12/22/2023 0     Neutrophils Relative 12/22/2023 78 (H)     Immat GRANS % 12/22/2023 0     Lymphocytes Relative 12/22/2023 14     Monocytes Relative 12/22/2023 6     Eosinophils Relative 12/22/2023 2     Basophils Relative 12/22/2023 0     Neutrophils Absolute 12/22/2023 5.21     Immature Grans Absolute 12/22/2023 0.03     Lymphocytes Absolute 12/22/2023 0.93     Monocytes Absolute 12/22/2023 0.37     Eosinophils Absolute 12/22/2023 0.14     Basophils Absolute 12/22/2023 0.02     Sodium 12/22/2023 141     Potassium 12/22/2023 4.2     Chloride 12/22/2023 109 (H)     CO2 12/22/2023 24     ANION GAP 12/22/2023 8     BUN 12/22/2023 67 (H)     Creatinine 12/22/2023 3.22 (H)     Glucose 12/22/2023 139     Calcium 12/22/2023 9.0     AST 12/22/2023 10 (L)     ALT 12/22/2023 6 (L)     Alkaline  Phosphatase 12/22/2023 62     Total Protein 12/22/2023 6.7     Albumin 12/22/2023 3.9     Total Bilirubin 12/22/2023 0.35     eGFR 12/22/2023 17     Lipase 12/22/2023 8 (L)     LACTIC ACID 12/22/2023 1.3     Protime 12/22/2023 13.4     INR 12/22/2023 0.96     PTT 12/22/2023 26     ABO Grouping 12/22/2023 A     Rh Factor 12/22/2023 Positive     Antibody Screen 12/22/2023 Negative     Specimen Expiration Date 12/22/2023 20231225     Magnesium 12/22/2023 1.9        Imaging Studies: I have personally reviewed pertinent reports.                  The patient was seen and examined by Dr. Ratliff, all mosley medical decisions were made with Dr. Ratliff.  Thank you for allowing us to participate in the care of this pleasant patient.  We will follow up with you closely.

## 2023-12-22 NOTE — ASSESSMENT & PLAN NOTE
Patient presented with new onset rectal bleeding, started after constipation.  He noticed splash of bright red blood, followed by dark black stools.  Previous admission on 12/5 with similar symptoms and underwent EGD  EGD 12/5/2023 showed ulcer which was clipped  C scope 12/5 showed diverticulosis only   CT abdomen 12/22/2023:    Mild wall thickening of the left and sigmoid colon may be due to colitis.  Sequela of chronic pancreatitis. A 2 cm hyperdense structure in the head of the pancreas is indeterminate but in retrospect is stable since at least 2021. This may reflect a hematoma or thrombosed pseudoaneurysm as the sequela of previous pancreatitis. Hyperdense cystic lesion is alternative possibility. Greater than 2-year stability favors benignity. A 6 to 12-month follow-up, preferably with MRI if performed, may be considered depending on comorbidities.\    Plan:  Received 1 L of NaCl ED, continue with the maintenance fluids 0.9% NaCl @75 mL/h  Received 80 mg of IV pantoprazole in the ED, consulted gastroenterology on-call.  Will start him on IV pantoprazole infusion.  Hold aspirin and Plavix and DVT prophylaxis.

## 2023-12-22 NOTE — CONSULTS
NEPHROLOGY HOSPITAL CONSULTATION   Jay Roach Jr. 78 y.o. male MRN: [id number]  Unit/Bed#: ED-20 Encounter: [id number]    ASSESSMENT and PLAN:  Jay Roach Jr. is a 78 y.o. male who was admitted to [facility] after presenting with melena. A renal consultation is requested today for assistance in the management of SHOAIB on CKD.    1.  Acute kidney injury.  Most likely prerenal SHOAIB in setting of GI bleed +/- hemodynamic changes in setting of ACE inhibitor and diuretic use..  Baseline creatinine 2.3-2.4.  Admission creatinine 3.22.  Kidney imaging with multiple bilateral nonobstructing calculi, no hydronephrosis.  Check urinalysis microscopy.  Hold ACE inhibitor and diuretic.  Agree with Isolyte at 75 cc/h.  No indication for renal replacement therapy.  Trend BMP.    2.  CKD stage IV.  Etiology of CKD most likely in setting of hypertensive nephrosclerosis and age-related nephron loss.  Follows with Dr. Girard from nephrology.    3.  Diastolic CHF/severe aortic stenosis status post TAVR.  No evidence of volume overload on examination today.  Hold diuretic.  Give gentle IV fluids as above.    4. History of hypertension.  Home medications include amlodipine 10 mg daily, lisinopril 20 mg daily, Toprol-XL 12.5 mg daily, torsemide 10 mg daily.  Continue amlodipine and Toprol-XL with hold parameters.  Hold lisinopril and torsemide.    5.  Anemia in CKD.  Currently being evaluated for melena.  Transfuse to keep hemoglobin more than 7.  Follow recommendations from gastroenterology.    Discussed with internal medicine team.  After discussion, we agreed that patient has developed acute kidney injury on CKD and to hold lisinopril and diuretic and give gentle hydration today.    HISTORY OF PRESENT ILLNESS:  Requesting Physician: Shalom Sharp DO  Reason for Consult: SHOAIB on CKD    Jay Roach Jr. is a 78 y.o. male who was admitted to [facility] after presenting with melena. A  renal consultation is requested today for assistance in the management of SHOAIB on CKD.  He has history of symptomatic anemia and melanotic stools.  EGD and colonoscopy were done during previous admission that did not show obvious GI bleeding sources.  EGD did show small ulcer in gastric fundus with pigmented spot.  He currently denies dyspnea.  He denies leg swelling.  He was not eating and drinking well at home.    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 ([facility]) 09/10/2021    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 ([facility]) 2/24/2023    Basal cell carcinoma     right cheek    CAD (coronary artery disease)     Carotid stenosis, asymptomatic, bilateral     Chronic kidney disease     Colon polyp     Diabetes ([facility])     type 2, non-insulin dependent    Diabetes mellitus ([facility])     GERD (gastroesophageal reflux disease)     History of nephrolithiasis     Hyperlipidemia     Hypertension     Left foot pain 11/30/2022    PAD (peripheral artery disease) ([facility])     Severe aortic stenosis     Squamous cell skin cancer 11/22/2022    left superior helix       PAST SURGICAL HISTORY:  Past Surgical History:   Procedure Laterality Date    APPENDECTOMY      CARDIAC CATHETERIZATION N/A 05/05/2022    Procedure: CARDIAC RHC/LHC;  Surgeon: Arvin Sanchez DO;  [facility];  Service: Cardiology    CARDIAC CATHETERIZATION N/A 05/05/2022    Procedure: Cardiac Coronary Angiogram;  Surgeon: Arvin Sanchez DO;  [facility];  Service: Cardiology    CARDIAC CATHETERIZATION N/A 05/24/2022    Procedure: Cardiac pci;  Surgeon: Damien Jeter MD;  [facility];  Service: Cardiology    CARDIAC CATHETERIZATION N/A 06/14/2022    Procedure: CARDIAC TAVR;  Surgeon: Nahum Vaz MD;  [facility];  Service: Cardiology    COLECTOMY       COLONOSCOPY  2013    CORONARY ARTERY BYPASS GRAFT  2013    X 2    FEMORAL ARTERY - POPLITEAL ARTERY BYPASS GRAFT      HEMORRHOID SURGERY      IR AORTAGRAM WITH RUN-OFF  11/19/2018    IR AORTAGRAM WITH RUN-OFF  3/2/2023    IR LOWER EXTREMITY ANGIOGRAM  3/23/2023    MOHS SURGERY Left 01/11/2023    SCC left superior helix-Dr Tovar    HI SLCTV CATHJ 3RD+ ORD SLCTV ABDL PEL/LXTR BRNCH Left 08/12/2016    Procedure: LEFT FEMORAL ARTERIOGRAM; BALLOON ANGIOPLASTY; SFA  AND FEMORAL AT VEIN GRAFT;  Surgeon: Nicholas Urena MD;  [facility];  Service: Vascular    HI TEAEC W/WO PATCH GRAFT COMMON FEMORAL Right 3/23/2023    Procedure: Common femoral endarterectomy&antegrade intervention of SFA, popliteal artery w/ shockwave;  Surgeon: Eagle Higuera MD;  [facility];  Service: Vascular    HI TRANSCATHETER TRANSAPICAL REPLACEMT AORTIC VALVE N/A 06/14/2022    Procedure: REPLACEMENT AORTIC VALVE TRANSCATHETER (TAVR) TRANSAPICAL 29MM IRVIN KYLER S3 ULTRA VALVE(ACCESS ON LEFT) ELANA;  Surgeon: Amarjit Gordon DO;  [facility];  Service: Cardiac Surgery    SKIN CANCER EXCISION      TONSILLECTOMY AND ADENOIDECTOMY         ALLERGIES:  No Known Allergies    SOCIAL HISTORY:  Social History     Substance and Sexual Activity   Alcohol Use Not Currently    Comment: rare     Social History     Substance and Sexual Activity   Drug Use No     Social History     Tobacco Use   Smoking Status Every Day    Current packs/day: 0.25    Average packs/day: 0.3 packs/day for 50.0 years (12.5 ttl pk-yrs)    Types: Cigarettes    Passive exposure: Current   Smokeless Tobacco Never       FAMILY HISTORY:  Family History   Problem Relation Age of Onset    Heart attack Father     Other Sister         bypass and vlave replacement    Stroke Paternal Uncle     Arrhythmia Neg Hx     Asthma Neg Hx     Clotting disorder Neg Hx     Fainting Neg Hx     Anuerysm Neg Hx     Hypertension Neg Hx         unsure     Hyperlipidemia Neg Hx      Heart failure Neg Hx        MEDICATIONS:    Current Facility-Administered Medications:     allopurinol (ZYLOPRIM) tablet 300 mg, 300 mg, Oral, Daily, Debby Merritt MD    amLODIPine (NORVASC) tablet 10 mg, 10 mg, Oral, Daily, Debby Merritt MD    atorvastatin (LIPITOR) tablet 80 mg, 80 mg, Oral, Daily With Dinner, Debby Merritt MD    calcitriol (ROCALTROL) capsule 0.25 mcg, 0.25 mcg, Oral, Daily, Debby Merritt MD    metoprolol succinate (TOPROL-XL) 24 hr tablet 12.5 mg, 12.5 mg, Oral, Daily, Debby Merritt MD    nicotine (NICODERM CQ) 14 mg/24hr TD 24 hr patch 1 patch, 1 patch, Transdermal, Daily, Debby Merritt MD    pancrelipase (Lip-Prot-Amyl) (CREON) delayed release capsule 24,000 Units, 24,000 Units, Oral, TID With Meals, Debby Merritt MD    pantoprazole (PROTONIX) 80 mg in sodium chloride 0.9 % 100 mL infusion, 8 mg/hr, Intravenous, Continuous, Shannon Lewis MD, Last Rate: 10 mL/hr at 12/22/23 1223, 8 mg/hr at 12/22/23 1223    Current Outpatient Medications:     allopurinol (ZYLOPRIM) 300 mg tablet, TAKE 1 TABLET DAILY, Disp: 90 tablet, Rfl: 1    amLODIPine (NORVASC) 10 mg tablet, TAKE 1 TABLET DAILY, Disp: 90 tablet, Rfl: 3    aspirin (ECOTRIN LOW STRENGTH) 81 mg EC tablet, Take 81 mg by mouth daily, Disp: , Rfl:     atorvastatin (LIPITOR) 80 mg tablet, TAKE 1 TABLET DAILY AT     BEDTIME, Disp: 90 tablet, Rfl: 3    calcitriol (ROCALTROL) 0.25 mcg capsule, Take 1 capsule (0.25 mcg total) by mouth daily, Disp: 90 capsule, Rfl: 4    clopidogrel (PLAVIX) 75 mg tablet, Take 1 tablet (75 mg total) by mouth daily, Disp: 90 tablet, Rfl: 3    D3-50 1.25 MG (46483 UT) capsule, TAKE 1 CAPSULE ONCE WEEKLY, Disp: 16 capsule, Rfl: 1    Empagliflozin (Jardiance) 10 MG TABS tablet, Take 1 tablet (10 mg total) by mouth in the morning, Disp: 90 tablet, Rfl: 3    glimepiride (AMARYL) 1 mg tablet, Take 1 tablet (1 mg total) by mouth  daily with breakfast, Disp: 90 tablet, Rfl: 1    glipiZIDE (GLUCOTROL) 5 mg tablet, TAKE 1 TABLET DAILY WITH   BREAKFAST, Disp: 90 tablet, Rfl: 1    lisinopril (ZESTRIL) 20 mg tablet, Take 1 tablet (20 mg total) by mouth daily, Disp: , Rfl:     metoprolol succinate (TOPROL-XL) 25 mg 24 hr tablet, Take 0.5 tablets (12.5 mg total) by mouth daily, Disp: 90 tablet, Rfl: 1    pancrelipase, Lip-Prot-Amyl, (CREON) 24,000 units, Take 24,000 units of lipase by mouth 3 (three) times a day with meals, Disp: 300 capsule, Rfl: 3    pantoprazole (PROTONIX) 40 mg tablet, Take 1 tablet (40 mg total) by mouth 2 (two) times a day, Disp: 90 tablet, Rfl: 0    torsemide (DEMADEX) 20 mg tablet, TAKE 0.5 TABLETS (10 MG TOTAL) BY MOUTH DAILY TAKES 10 MG ONCE A DAY., Disp: 90 tablet, Rfl: 3    REVIEW OF SYSTEMS:  Review of Systems   Constitutional:  Negative for chills and fever.   HENT:  Negative for ear pain and sore throat.    Eyes:  Negative for pain and visual disturbance.   Respiratory:  Negative for cough and shortness of breath.    Cardiovascular:  Negative for chest pain and palpitations.   Gastrointestinal:  Positive for blood in stool. Negative for vomiting.   Genitourinary:  Negative for dysuria and hematuria.   Musculoskeletal:  Negative for arthralgias and back pain.   Skin:  Negative for color change and rash.   Neurological:  Negative for seizures and syncope.   All other systems reviewed and are negative.      PHYSICAL EXAM:  Current Weight:    First Weight:    Vitals:    12/22/23 1130 12/22/23 1200 12/22/23 1230 12/22/23 1415   BP: 154/63 156/69 147/67 123/51   Pulse: 74 62 56 62   Resp: 18  18 18   Temp: 97.8 °F (36.6 °C)      TempSrc: Oral      SpO2: 99% 100% 100% 99%       Intake/Output Summary (Last 24 hours) at 12/22/2023 1552  Last data filed at 12/22/2023 1533  Gross per 24 hour   Intake 1000 ml   Output --   Net 1000 ml     Physical Exam  Constitutional:       Appearance: Normal appearance.   HENT:      Head:  "Normocephalic and atraumatic.      Mouth/Throat:      Mouth: Mucous membranes are moist.      Pharynx: Oropharynx is clear.   Cardiovascular:      Rate and Rhythm: Normal rate and regular rhythm.      Pulses: Normal pulses.      Heart sounds: Normal heart sounds.   Pulmonary:      Effort: Pulmonary effort is normal.      Breath sounds: Normal breath sounds.   Abdominal:      General: Bowel sounds are normal.      Palpations: Abdomen is soft.   Musculoskeletal:         General: Normal range of motion.      Right lower leg: No edema.      Left lower leg: No edema.   Skin:     General: Skin is warm and dry.   Neurological:      General: No focal deficit present.      Mental Status: He is alert and oriented to person, place, and time. Mental status is at baseline.   Psychiatric:         Mood and Affect: Mood normal.         Behavior: Behavior normal.         Thought Content: Thought content normal.         Judgment: Judgment normal.       Lab Results:   Results from last 7 days   Lab Units 12/22/23  1157   WBC Thousand/uL 6.70   HEMOGLOBIN g/dL 8.2*   HEMATOCRIT % 26.1*   PLATELETS Thousands/uL 180   POTASSIUM mmol/L 4.2   CHLORIDE mmol/L 109*   CO2 mmol/L 24   BUN mg/dL 67*   CREATININE mg/dL 3.22*   CALCIUM mg/dL 9.0   MAGNESIUM mg/dL 1.9   ALK PHOS U/L 62   ALT U/L 6*   AST U/L 10*     Portions of the record may have been created with voice recognition software. Occasional wrong word or \"sound a like\" substitutions may have occurred due to the inherent limitations of voice recognition software. Read the chart carefully and recognize, using context, where substitutions have occurred. If you have any questions, please contact the dictating provider.    "

## 2023-12-23 ENCOUNTER — ANESTHESIA (INPATIENT)
Dept: PERIOP | Facility: HOSPITAL | Age: 78
DRG: 438 | End: 2023-12-23
Payer: MEDICARE

## 2023-12-23 ENCOUNTER — APPOINTMENT (OUTPATIENT)
Dept: PERIOP | Facility: HOSPITAL | Age: 78
DRG: 438 | End: 2023-12-23
Payer: MEDICARE

## 2023-12-23 ENCOUNTER — ANESTHESIA EVENT (INPATIENT)
Dept: PERIOP | Facility: HOSPITAL | Age: 78
DRG: 438 | End: 2023-12-23
Payer: MEDICARE

## 2023-12-23 LAB
25(OH)D3 SERPL-MCNC: 58.5 NG/ML (ref 30–100)
ABO GROUP BLD BPU: NORMAL
ANION GAP SERPL CALCULATED.3IONS-SCNC: 4 MMOL/L
APTT PPP: 29 SECONDS (ref 23–37)
BASOPHILS # BLD AUTO: 0.02 THOUSANDS/ÂΜL (ref 0–0.1)
BASOPHILS NFR BLD AUTO: 0 % (ref 0–1)
BPU ID: NORMAL
BUN SERPL-MCNC: 53 MG/DL (ref 5–25)
CALCIUM SERPL-MCNC: 7.9 MG/DL (ref 8.4–10.2)
CHLORIDE SERPL-SCNC: 113 MMOL/L (ref 96–108)
CO2 SERPL-SCNC: 24 MMOL/L (ref 21–32)
CREAT SERPL-MCNC: 2.31 MG/DL (ref 0.6–1.3)
CROSSMATCH: NORMAL
EOSINOPHIL # BLD AUTO: 0.21 THOUSAND/ÂΜL (ref 0–0.61)
EOSINOPHIL NFR BLD AUTO: 5 % (ref 0–6)
ERYTHROCYTE [DISTWIDTH] IN BLOOD BY AUTOMATED COUNT: 15.2 % (ref 11.6–15.1)
GFR SERPL CREATININE-BSD FRML MDRD: 25 ML/MIN/1.73SQ M
GLUCOSE SERPL-MCNC: 90 MG/DL (ref 65–140)
GLUCOSE SERPL-MCNC: 93 MG/DL (ref 65–140)
GLUCOSE SERPL-MCNC: 96 MG/DL (ref 65–140)
GLUCOSE SERPL-MCNC: 99 MG/DL (ref 65–140)
HCT VFR BLD AUTO: 20.2 % (ref 36.5–49.3)
HCT VFR BLD AUTO: 21.6 % (ref 36.5–49.3)
HCT VFR BLD AUTO: 25.4 % (ref 36.5–49.3)
HCT VFR BLD AUTO: 25.6 % (ref 36.5–49.3)
HGB BLD-MCNC: 6.4 G/DL (ref 12–17)
HGB BLD-MCNC: 6.8 G/DL (ref 12–17)
HGB BLD-MCNC: 8 G/DL (ref 12–17)
HGB BLD-MCNC: 8.3 G/DL (ref 12–17)
IMM GRANULOCYTES # BLD AUTO: 0.02 THOUSAND/UL (ref 0–0.2)
IMM GRANULOCYTES NFR BLD AUTO: 0 % (ref 0–2)
INR PPP: 1.08 (ref 0.84–1.19)
LYMPHOCYTES # BLD AUTO: 1.12 THOUSANDS/ÂΜL (ref 0.6–4.47)
LYMPHOCYTES NFR BLD AUTO: 25 % (ref 14–44)
MAGNESIUM SERPL-MCNC: 1.7 MG/DL (ref 1.9–2.7)
MCH RBC QN AUTO: 30.6 PG (ref 26.8–34.3)
MCHC RBC AUTO-ENTMCNC: 31 G/DL (ref 31.4–37.4)
MCV RBC AUTO: 99 FL (ref 82–98)
MONOCYTES # BLD AUTO: 0.3 THOUSAND/ÂΜL (ref 0.17–1.22)
MONOCYTES NFR BLD AUTO: 7 % (ref 4–12)
NEUTROPHILS # BLD AUTO: 2.79 THOUSANDS/ÂΜL (ref 1.85–7.62)
NEUTS SEG NFR BLD AUTO: 63 % (ref 43–75)
NRBC BLD AUTO-RTO: 0 /100 WBCS
PLATELET # BLD AUTO: 131 THOUSANDS/UL (ref 149–390)
PMV BLD AUTO: 9.8 FL (ref 8.9–12.7)
POTASSIUM SERPL-SCNC: 4 MMOL/L (ref 3.5–5.3)
PROTHROMBIN TIME: 14.6 SECONDS (ref 11.6–14.5)
PTH-INTACT SERPL-MCNC: 76.4 PG/ML (ref 12–88)
RBC # BLD AUTO: 2.19 MILLION/UL (ref 3.88–5.62)
SODIUM SERPL-SCNC: 141 MMOL/L (ref 135–147)
UNIT DISPENSE STATUS: NORMAL
UNIT PRODUCT CODE: NORMAL
UNIT PRODUCT VOLUME: 350 ML
UNIT RH: NORMAL
WBC # BLD AUTO: 4.46 THOUSAND/UL (ref 4.31–10.16)

## 2023-12-23 PROCEDURE — 43255 EGD CONTROL BLEEDING ANY: CPT | Performed by: INTERNAL MEDICINE

## 2023-12-23 PROCEDURE — 83970 ASSAY OF PARATHORMONE: CPT | Performed by: INTERNAL MEDICINE

## 2023-12-23 PROCEDURE — 85025 COMPLETE CBC W/AUTO DIFF WBC: CPT

## 2023-12-23 PROCEDURE — P9016 RBC LEUKOCYTES REDUCED: HCPCS

## 2023-12-23 PROCEDURE — 85610 PROTHROMBIN TIME: CPT

## 2023-12-23 PROCEDURE — 85018 HEMOGLOBIN: CPT

## 2023-12-23 PROCEDURE — 83735 ASSAY OF MAGNESIUM: CPT

## 2023-12-23 PROCEDURE — 85730 THROMBOPLASTIN TIME PARTIAL: CPT

## 2023-12-23 PROCEDURE — C9113 INJ PANTOPRAZOLE SODIUM, VIA: HCPCS

## 2023-12-23 PROCEDURE — 80048 BASIC METABOLIC PNL TOTAL CA: CPT

## 2023-12-23 PROCEDURE — 82948 REAGENT STRIP/BLOOD GLUCOSE: CPT

## 2023-12-23 PROCEDURE — 99232 SBSQ HOSP IP/OBS MODERATE 35: CPT | Performed by: INTERNAL MEDICINE

## 2023-12-23 PROCEDURE — 82306 VITAMIN D 25 HYDROXY: CPT | Performed by: INTERNAL MEDICINE

## 2023-12-23 PROCEDURE — 85014 HEMATOCRIT: CPT

## 2023-12-23 PROCEDURE — 99232 SBSQ HOSP IP/OBS MODERATE 35: CPT | Performed by: HOSPITALIST

## 2023-12-23 RX ORDER — FENTANYL CITRATE 50 UG/ML
INJECTION, SOLUTION INTRAMUSCULAR; INTRAVENOUS AS NEEDED
Status: DISCONTINUED | OUTPATIENT
Start: 2023-12-23 | End: 2023-12-23

## 2023-12-23 RX ORDER — PROPOFOL 10 MG/ML
INJECTION, EMULSION INTRAVENOUS AS NEEDED
Status: DISCONTINUED | OUTPATIENT
Start: 2023-12-23 | End: 2023-12-23

## 2023-12-23 RX ORDER — METOCLOPRAMIDE HYDROCHLORIDE 5 MG/ML
INJECTION INTRAMUSCULAR; INTRAVENOUS AS NEEDED
Status: DISCONTINUED | OUTPATIENT
Start: 2023-12-23 | End: 2023-12-23

## 2023-12-23 RX ORDER — LANOLIN ALCOHOL/MO/W.PET/CERES
800 CREAM (GRAM) TOPICAL ONCE
Status: COMPLETED | OUTPATIENT
Start: 2023-12-23 | End: 2023-12-23

## 2023-12-23 RX ORDER — SODIUM CHLORIDE, SODIUM GLUCONATE, SODIUM ACETATE, POTASSIUM CHLORIDE, MAGNESIUM CHLORIDE, SODIUM PHOSPHATE, DIBASIC, AND POTASSIUM PHOSPHATE .53; .5; .37; .037; .03; .012; .00082 G/100ML; G/100ML; G/100ML; G/100ML; G/100ML; G/100ML; G/100ML
75 INJECTION, SOLUTION INTRAVENOUS CONTINUOUS
Status: DISCONTINUED | OUTPATIENT
Start: 2023-12-23 | End: 2023-12-24

## 2023-12-23 RX ORDER — EPINEPHRINE 1 MG/ML
INJECTION, SOLUTION, CONCENTRATE INTRAVENOUS AS NEEDED
Status: COMPLETED | OUTPATIENT
Start: 2023-12-23 | End: 2023-12-23

## 2023-12-23 RX ORDER — SODIUM CHLORIDE, SODIUM LACTATE, POTASSIUM CHLORIDE, CALCIUM CHLORIDE 600; 310; 30; 20 MG/100ML; MG/100ML; MG/100ML; MG/100ML
INJECTION, SOLUTION INTRAVENOUS CONTINUOUS PRN
Status: DISCONTINUED | OUTPATIENT
Start: 2023-12-23 | End: 2023-12-23

## 2023-12-23 RX ORDER — LIDOCAINE HYDROCHLORIDE 10 MG/ML
INJECTION, SOLUTION EPIDURAL; INFILTRATION; INTRACAUDAL; PERINEURAL AS NEEDED
Status: DISCONTINUED | OUTPATIENT
Start: 2023-12-23 | End: 2023-12-23

## 2023-12-23 RX ADMIN — FENTANYL CITRATE 25 MCG: 50 INJECTION INTRAMUSCULAR; INTRAVENOUS at 13:59

## 2023-12-23 RX ADMIN — EPINEPHRINE 1 MG: 1 INJECTION, SOLUTION INTRAMUSCULAR; SUBCUTANEOUS at 14:06

## 2023-12-23 RX ADMIN — PROPOFOL 25 MG: 10 INJECTION, EMULSION INTRAVENOUS at 14:03

## 2023-12-23 RX ADMIN — AMLODIPINE BESYLATE 10 MG: 10 TABLET ORAL at 08:52

## 2023-12-23 RX ADMIN — SODIUM CHLORIDE, SODIUM LACTATE, POTASSIUM CHLORIDE, AND CALCIUM CHLORIDE: .6; .31; .03; .02 INJECTION, SOLUTION INTRAVENOUS at 13:39

## 2023-12-23 RX ADMIN — ALLOPURINOL 300 MG: 300 TABLET ORAL at 08:51

## 2023-12-23 RX ADMIN — CALCITRIOL 0.25 MCG: 0.25 CAPSULE, LIQUID FILLED ORAL at 08:55

## 2023-12-23 RX ADMIN — PROPOFOL 25 MG: 10 INJECTION, EMULSION INTRAVENOUS at 14:13

## 2023-12-23 RX ADMIN — FENTANYL CITRATE 25 MCG: 50 INJECTION INTRAMUSCULAR; INTRAVENOUS at 14:16

## 2023-12-23 RX ADMIN — PROPOFOL 25 MG: 10 INJECTION, EMULSION INTRAVENOUS at 14:11

## 2023-12-23 RX ADMIN — LIDOCAINE HYDROCHLORIDE 40 MG: 10 INJECTION, SOLUTION EPIDURAL; INFILTRATION; INTRACAUDAL; PERINEURAL at 14:01

## 2023-12-23 RX ADMIN — PANTOPRAZOLE SODIUM 8 MG/HR: 40 INJECTION, POWDER, FOR SOLUTION INTRAVENOUS at 10:59

## 2023-12-23 RX ADMIN — PANCRELIPASE 24000 UNITS: 24000; 76000; 120000 CAPSULE, DELAYED RELEASE PELLETS ORAL at 18:43

## 2023-12-23 RX ADMIN — SODIUM CHLORIDE, SODIUM GLUCONATE, SODIUM ACETATE, POTASSIUM CHLORIDE, MAGNESIUM CHLORIDE, SODIUM PHOSPHATE, DIBASIC, AND POTASSIUM PHOSPHATE 75 ML/HR: .53; .5; .37; .037; .03; .012; .00082 INJECTION, SOLUTION INTRAVENOUS at 16:58

## 2023-12-23 RX ADMIN — PROPOFOL 25 MG: 10 INJECTION, EMULSION INTRAVENOUS at 14:06

## 2023-12-23 RX ADMIN — ATORVASTATIN CALCIUM 80 MG: 40 TABLET, FILM COATED ORAL at 17:26

## 2023-12-23 RX ADMIN — PROPOFOL 25 MG: 10 INJECTION, EMULSION INTRAVENOUS at 14:21

## 2023-12-23 RX ADMIN — METOPROLOL SUCCINATE 12.5 MG: 25 TABLET, EXTENDED RELEASE ORAL at 08:51

## 2023-12-23 RX ADMIN — Medication 800 MG: at 08:51

## 2023-12-23 RX ADMIN — METOCLOPRAMIDE 5 MG: 5 INJECTION, SOLUTION INTRAMUSCULAR; INTRAVENOUS at 13:59

## 2023-12-23 RX ADMIN — PROPOFOL 25 MG: 10 INJECTION, EMULSION INTRAVENOUS at 14:09

## 2023-12-23 RX ADMIN — PROPOFOL 70 MG: 10 INJECTION, EMULSION INTRAVENOUS at 14:01

## 2023-12-23 NOTE — ASSESSMENT & PLAN NOTE
Lab Results   Component Value Date    HGBA1C 6.1 (H) 12/09/2023       Recent Labs     12/22/23  1643 12/23/23  0729 12/23/23  1059   POCGLU 93 90 96       Blood Sugar Average: Last 72 hrs:  (P) 93Hold Jardiance, glimepiride, glipizide.    Plan:  Siding scale for now

## 2023-12-23 NOTE — ASSESSMENT & PLAN NOTE
Blood Pressure: 134/76    Plan:  Continue amlodipine and metoprolol  Medications held: Lisinopril and torsemide  Monitor blood pressure  PRN IV Labetalol  for SBP > 180

## 2023-12-23 NOTE — PLAN OF CARE
Problem: PAIN - ADULT  Goal: Verbalizes/displays adequate comfort level or baseline comfort level  Description: Interventions:  - Encourage patient to monitor pain and request assistance  - Assess pain using appropriate pain scale  - Administer analgesics based on type and severity of pain and evaluate response  - Implement non-pharmacological measures as appropriate and evaluate response  - Consider cultural and social influences on pain and pain management  - Notify physician/advanced practitioner if interventions unsuccessful or patient reports new pain  Outcome: Progressing     Problem: INFECTION - ADULT  Goal: Absence or prevention of progression during hospitalization  Description: INTERVENTIONS:  - Assess and monitor for signs and symptoms of infection  - Monitor lab/diagnostic results  - Monitor all insertion sites, i.e. indwelling lines, tubes, and drains  - Monitor endotracheal if appropriate and nasal secretions for changes in amount and color  - [place] appropriate cooling/warming therapies per order  - Administer medications as ordered  - Instruct and encourage patient and family to use good hand hygiene technique  - Identify and instruct in appropriate isolation precautions for identified infection/condition  Outcome: Progressing  Goal: Absence of fever/infection during neutropenic period  Description: INTERVENTIONS:  - Monitor WBC    Outcome: Progressing     Problem: SAFETY ADULT  Goal: Patient will remain free of falls  Description: INTERVENTIONS:  - Educate patient/family on patient safety including physical limitations  - Instruct patient to call for assistance with activity   - Consult OT/PT to assist with strengthening/mobility   - Keep Call bell within reach  - Keep bed low and locked with side rails adjusted as appropriate  - Keep care items and personal belongings within reach  - Initiate and maintain comfort rounds  - Make Fall Risk Sign visible to staff  - Offer Toileting every  Hours,  in advance of need  - Initiate/Maintain alarm  - Obtain necessary fall risk management equipment:   - Apply yellow socks and bracelet for high fall risk patients  - Consider moving patient to room near nurses station  Outcome: Progressing  Goal: Maintain or return to baseline ADL function  Description: INTERVENTIONS:  -  Assess patient's ability to carry out ADLs; assess patient's baseline for ADL function and identify physical deficits which impact ability to perform ADLs (bathing, care of mouth/teeth, toileting, grooming, dressing, etc.)  - Assess/evaluate cause of self-care deficits   - Assess range of motion  - Assess patient's mobility; develop plan if impaired  - Assess patient's need for assistive devices and provide as appropriate  - Encourage maximum independence but intervene and supervise when necessary  - Involve family in performance of ADLs  - Assess for home care needs following discharge   - Consider OT consult to assist with ADL evaluation and planning for discharge  - Provide patient education as appropriate  Outcome: Progressing  Goal: Maintains/Returns to pre admission functional level  Description: INTERVENTIONS:  - Perform AM-PAC 6 Click Basic Mobility/ Daily Activity assessment daily.  - Set and communicate daily mobility goal to care team and patient/family/caregiver.   - Collaborate with rehabilitation services on mobility goals if consulted  - Perform Range of Motion  times a day.  - Reposition patient every  hours.  - Dangle patient  times a day  - Stand patient  times a day  - Ambulate patient  times a day  - Out of bed to chair  times a day   - Out of bed for meals  times a day  - Out of bed for toileting  - Record patient progress and toleration of activity level   Outcome: Progressing     Problem: DISCHARGE PLANNING  Goal: Discharge to home or other facility with appropriate resources  Description: INTERVENTIONS:  - Identify barriers to discharge w/patient and caregiver  - Arrange for  needed discharge resources and transportation as appropriate  - Identify discharge learning needs (meds, wound care, etc.)  - Arrange for interpretive services to assist at discharge as needed  - Refer to Case Management Department for coordinating discharge planning if the patient needs post-hospital services based on physician/advanced practitioner order or complex needs related to functional status, cognitive ability, or social support system  Outcome: Progressing     Problem: Knowledge Deficit  Goal: Patient/family/caregiver demonstrates understanding of disease process, treatment plan, medications, and discharge instructions  Description: Complete learning assessment and assess knowledge base.  Interventions:  - Provide teaching at level of understanding  - Provide teaching via preferred learning methods  Outcome: Progressing

## 2023-12-23 NOTE — ASSESSMENT & PLAN NOTE
Lab Results   Component Value Date    EGFR 25 12/23/2023    EGFR 17 12/22/2023    EGFR 29 12/10/2023    CREATININE 2.31 (H) 12/23/2023    CREATININE 3.22 (H) 12/22/2023    CREATININE 2.05 (H) 12/10/2023     Patient has a history of hyper parathyroidism and has been taking calcitriol 0.25 mcg daily followed by vitamin D3 50,000 units once a week.  Etiology of elevated creatinine: History of CKD superimposed with Pre renal SHOAIB due to poor oral intake vs lisinopril vs Jardiance Vs Torsemide.    Plan:  Hold lisinopril and  Jardiance  and Torsemide.  Nephrology consulted, recs appreciated  Continue IV Plasmalyte 75 ml/hr

## 2023-12-23 NOTE — ANESTHESIA PREPROCEDURE EVALUATION
Procedure:  EGD    Relevant Problems   CARDIO   (+) CAD (coronary artery disease)   (+) Hyperlipidemia   (+) Hypertension   (+) Progressive angina (HCC)   (+) Sinus bradycardia      ENDO   (+) Secondary hyperparathyroidism of renal origin (HCC)   (+) Type 2 diabetes mellitus (HCC)      GI/HEPATIC   (+) Chronic pancreatitis (HCC)   (+) GERD (gastroesophageal reflux disease)   (+) GI bleed      /RENAL   (+) Acute kidney injury superimposed on CKD    (+) Acute kidney injury superimposed on chronic kidney disease    (+) Acute renal failure superimposed on stage 4 chronic kidney disease (HCC)   (+) Benign hypertension with chronic kidney disease, stage III (HCC)   (+) CKD (chronic kidney disease) stage 4, GFR 15-29 ml/min (HCC)   (+) Renal cyst, left      HEMATOLOGY   (+) Acute blood loss anemia   (+) Anemia due to stage 4 chronic kidney disease    (+) Iron deficiency anemia      NEURO/PSYCH   (+) Atherosclerosis of native arteries of extremities with intermittent claudication, bilateral legs (HCC)   (+) Progressive angina (HCC)   (+) Tension headache        Medical notes reviewed  S/p TAVR  Per EPIC, 2 U PRBCs transfused 12/23 early morning    TTE June 2023:    Left Ventricle: Left ventricular cavity size is normal. Wall thickness is moderately increased. The left ventricular ejection fraction is 65%. Systolic function is normal. Wall motion is normal. Diastolic function is mildly abnormal, consistent with grade I (abnormal) relaxation.    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 The prosthetic valve appears well-seated and appears to be functioning normally. There is trace paravalvular regurgitation. There is no evidence of transvalvular regurgitation. The gradient recorded across the prosthetic aortic valve is within the expected range. The aortic valve peak velocity is 1.81 m/s. The aortic valve peak gradient is 13 mmHg. The aortic valve mean gradient is 8 mmHg.  Aortic valve  area is 1.76 cm².        Physical Exam    Airway    Mallampati score: II  TM Distance: >3 FB  Neck ROM: full     Dental       Cardiovascular  Cardiovascular exam normal    Pulmonary  Pulmonary exam normal     Other Findings        Anesthesia Plan  ASA Score- 4 Emergent    Anesthesia Type- IV sedation with anesthesia with ASA Monitors.         Additional Monitors:     Airway Plan:            Plan Factors-    Chart reviewed. EKG reviewed.  Existing labs reviewed. Patient summary reviewed.                  Induction- intravenous.    Postoperative Plan-     Informed Consent- Anesthetic plan and risks discussed with patient.  I personally reviewed this patient with the CRNA. Discussed and agreed on the Anesthesia Plan with the CRNA..

## 2023-12-23 NOTE — PLAN OF CARE
Problem: PAIN - ADULT  Goal: Verbalizes/displays adequate comfort level or baseline comfort level  Description: Interventions:  - Encourage patient to monitor pain and request assistance  - Assess pain using appropriate pain scale  - Administer analgesics based on type and severity of pain and evaluate response  - Implement non-pharmacological measures as appropriate and evaluate response  - Consider cultural and social influences on pain and pain management  - Notify physician/advanced practitioner if interventions unsuccessful or patient reports new pain  Outcome: Progressing     Problem: INFECTION - ADULT  Goal: Absence or prevention of progression during hospitalization  Description: INTERVENTIONS:  - Assess and monitor for signs and symptoms of infection  - Monitor lab/diagnostic results  - Monitor all insertion sites, i.e. indwelling lines, tubes, and drains  - Monitor endotracheal if appropriate and nasal secretions for changes in amount and color  - [place] appropriate cooling/warming therapies per order  - Administer medications as ordered  - Instruct and encourage patient and family to use good hand hygiene technique  - Identify and instruct in appropriate isolation precautions for identified infection/condition  Outcome: Progressing  Goal: Absence of fever/infection during neutropenic period  Description: INTERVENTIONS:  - Monitor WBC    Outcome: Progressing     Problem: SAFETY ADULT  Goal: Patient will remain free of falls  Description: INTERVENTIONS:  - Educate patient/family on patient safety including physical limitations  - Instruct patient to call for assistance with activity   - Consult OT/PT to assist with strengthening/mobility   - Keep Call bell within reach  - Keep bed low and locked with side rails adjusted as appropriate  - Keep care items and personal belongings within reach  - Initiate and maintain comfort rounds  - Make Fall Risk Sign visible to staff  - Offer Toileting every  Hours,  in advance of need  - Initiate/Maintain alarm  - Obtain necessary fall risk management equipment:   - Apply yellow socks and bracelet for high fall risk patients  - Consider moving patient to room near nurses station  Outcome: Progressing  Goal: Maintain or return to baseline ADL function  Description: INTERVENTIONS:  -  Assess patient's ability to carry out ADLs; assess patient's baseline for ADL function and identify physical deficits which impact ability to perform ADLs (bathing, care of mouth/teeth, toileting, grooming, dressing, etc.)  - Assess/evaluate cause of self-care deficits   - Assess range of motion  - Assess patient's mobility; develop plan if impaired  - Assess patient's need for assistive devices and provide as appropriate  - Encourage maximum independence but intervene and supervise when necessary  - Involve family in performance of ADLs  - Assess for home care needs following discharge   - Consider OT consult to assist with ADL evaluation and planning for discharge  - Provide patient education as appropriate  Outcome: Progressing  Goal: Maintains/Returns to pre admission functional level  Description: INTERVENTIONS:  - Perform AM-PAC 6 Click Basic Mobility/ Daily Activity assessment daily.  - Set and communicate daily mobility goal to care team and patient/family/caregiver.   - Collaborate with rehabilitation services on mobility goals if consulted  - Perform Range of Motion  times a day.  - Reposition patient every  hours.  - Dangle patient  times a day  - Stand patient  times a day  - Ambulate patient  times a day  - Out of bed to chair  times a day   - Out of bed for meals times a day  - Out of bed for toileting  - Record patient progress and toleration of activity level   Outcome: Progressing     Problem: DISCHARGE PLANNING  Goal: Discharge to home or other facility with appropriate resources  Description: INTERVENTIONS:  - Identify barriers to discharge w/patient and caregiver  - Arrange for  needed discharge resources and transportation as appropriate  - Identify discharge learning needs (meds, wound care, etc.)  - Arrange for interpretive services to assist at discharge as needed  - Refer to Case Management Department for coordinating discharge planning if the patient needs post-hospital services based on physician/advanced practitioner order or complex needs related to functional status, cognitive ability, or social support system  Outcome: Progressing     Problem: Knowledge Deficit  Goal: Patient/family/caregiver demonstrates understanding of disease process, treatment plan, medications, and discharge instructions  Description: Complete learning assessment and assess knowledge base.  Interventions:  - Provide teaching at level of understanding  - Provide teaching via preferred learning methods  Outcome: Progressing

## 2023-12-23 NOTE — ANESTHESIA POSTPROCEDURE EVALUATION
Post-Op Assessment Note    CV Status:  Stable  Pain Score: 0    Pain management: adequate       Mental Status:  Sleepy   Hydration Status:  Euvolemic   PONV Controlled:  Controlled   Airway Patency:  Patent     Post Op Vitals Reviewed: Yes      Staff: CRNA   Comments: vss sv nonobstructed uneventful              /50 (12/23/23 1430)    Temp (!) 97.4 °F (36.3 °C) (12/23/23 1430)    Pulse 63 (12/23/23 1430)   Resp 20 (12/23/23 1430)    SpO2 95 % (12/23/23 1430)

## 2023-12-23 NOTE — ED ATTENDING ATTESTATION
12/22/2023  IAna MD, saw and evaluated the patient. I have discussed the patient with the resident/non-physician practitioner and agree with the resident's/non-physician practitioner's findings, Plan of Care, and MDM as documented in the resident's/non-physician practitioner's note, except where noted. All available labs and Radiology studies were reviewed.  I was present for key portions of any procedure(s) performed by the resident/non-physician practitioner and I was immediately available to provide assistance.       At this point I agree with the current assessment done in the Emergency Department.  I have conducted an independent evaluation of this patient a history and physical is as follows:    78-year-old presented to the ER due to rectal bleeding.  Scribes as melena.  No lightness or dizziness.  No chest pain or short of breath.  No nausea vomiting.  Generalized abdominal discomfort worse in the lower abdomen.  No fevers or chills.    Agree with workup, baseline labs, Protonix, CT, likely admission to hospital      ED Course         Critical Care Time  Procedures

## 2023-12-23 NOTE — INTERVAL H&P NOTE
H&P reviewed. After examining the patient I find no changes in the patients condition since the H&P had been written.    Vitals:    12/23/23 1340   BP: (!) 175/72   Pulse: 61   Resp: 20   Temp: 97.5 °F (36.4 °C)   SpO2: 96%

## 2023-12-23 NOTE — ASSESSMENT & PLAN NOTE
Patient presented with new onset rectal bleeding, started after constipation.  He noticed splash of bright red blood, followed by dark black stools.  Previous admission on 12/5 with similar symptoms and underwent EGD  EGD 12/5/2023 showed ulcer which was clipped  C scope 12/5 showed diverticulosis only   CT abdomen 12/22/2023:    Mild wall thickening of the left and sigmoid colon may be due to colitis.  Sequela of chronic pancreatitis. A 2 cm hyperdense structure in the head of the pancreas is indeterminate but in retrospect is stable since at least 2021. This may reflect a hematoma or thrombosed pseudoaneurysm as the sequela of previous pancreatitis. Hyperdense cystic lesion is alternative possibility. Greater than 2-year stability favors benignity. A 6 to 12-month follow-up, preferably with MRI if performed, may be considered depending on comorbidities.\  In ED, S/p 1 L NSS IVF and IV pantoprazole 80mg  S/p 2U PRBC  Plan:  GI consulted, EGD planned for 1:30 today  Continue with the maintenance fluids 0.9% NaCl @75 mL/h  Continue  IV pantoprazole infusion.  Hold aspirin and Plavix and DVT prophylaxis  F/u H&H q8h  Blood transfusion if HB<7

## 2023-12-23 NOTE — PROGRESS NOTES
NEPHROLOGY HOSPITAL PROGRESS NOTE   Jay Roach Jr. 78 y.o. male MRN: [id number]  Unit/Bed#: W -01 Encounter: [id number]  Reason for Consult: SHOAIB on CKD    ASSESSMENT and PLAN:  78-year-old male with history of CKD admitted with melena.  We are consulted for management of SHOAIB on CKD.    1.  Acute kidney injury.  Most likely prerenal SHOAIB in setting of GI bleed +/- hemodynamic changes in setting of ACE inhibitor and diuretic use..  Baseline creatinine 2.3-2.4.  Admission creatinine 3.22.  Creatinine today 2.3.  Kidney imaging with multiple nonobstructing calculi, no hydronephrosis.  Kidney function is currently back to baseline.  Continue to hold ACE inhibitor and diuretic.  Continue Isolyte at 75 cc/h especially in setting of n.p.o. status.  No indication for renal replacement therapy.  Trend BMP.      2.  CKD stage IV.  Etiology of CKD most likely in setting of hypertensive nephrosclerosis and age-related nephron loss.  Follows with Dr. Muñoz from nephrology.     3.  Diastolic CHF/severe aortic stenosis status post TAVR.  No evidence of volume overload on examination today.  Hold home diuretic.  Continue gentle IV fluids as above.     4. History of hypertension.  Home medications include amlodipine 10 mg daily, lisinopril 20 mg daily, Toprol-XL 12.5 mg daily and torsemide 10 mg daily.  Continue amlodipine and Toprol-XL with hold parameters.  Continue to hold lisinopril and torsemide as above.     5.  Anemia in CKD.  Hemoglobin today 6.8.  Currently being evaluated for melena.  He was recently admitted earlier this month for similar presentation and had an EGD/colonoscopy which showed ulcer in gastric fundus with pigmented spot status post cautery and hemostatic clip x 4.  Timing of EGD per gastroenterology.    Discussed with internal medicine team.  After discussion, we agreed that kidney function is currently improving with gentle hydration and to continue to hydrate 75 cc/h today.     SUBJECTIVE /  24H INTERVAL HISTORY:  Urine output recorded as 625 cc.  Hemoglobin has dropped this morning.  He has not had a melanotic stool since admission.  Denies dyspnea.  Denies leg swelling.    OBJECTIVE:  Current Weight: Weight - Scale: 77.2 kg (170 lb 3.1 oz)  Vitals:    12/23/23 0202 12/23/23 0345 12/23/23 0345 12/23/23 0600   BP: (!) 139/46 154/53 154/53    Pulse: 57 (!) 51 (!) 51    Resp: 18 18     Temp: 97.7 °F (36.5 °C) 97.5 °F (36.4 °C) 97.5 °F (36.4 °C)    TempSrc: Oral      SpO2: 97%  97%    Weight:    77.2 kg (170 lb 3.1 oz)       Intake/Output Summary (Last 24 hours) at 12/23/2023 0630  Last data filed at 12/23/2023 0345  Gross per 24 hour   Intake 1350 ml   Output 625 ml   Net 725 ml     Review of Systems   Constitutional:  Negative for chills and fever.   HENT:  Negative for ear pain and sore throat.    Eyes:  Negative for pain and visual disturbance.   Respiratory:  Negative for cough and shortness of breath.    Cardiovascular:  Negative for chest pain and palpitations.   Gastrointestinal:  Negative for abdominal pain and vomiting.   Genitourinary:  Negative for dysuria and hematuria.   Musculoskeletal:  Negative for arthralgias and back pain.   Skin:  Negative for color change and rash.   Neurological:  Negative for seizures and syncope.   All other systems reviewed and are negative.    Physical Exam  Vitals and nursing note reviewed.   Constitutional:       General: He is not in acute distress.     Appearance: He is well-developed.   HENT:      Head: Normocephalic and atraumatic.   Eyes:      Conjunctiva/sclera: Conjunctivae normal.   Cardiovascular:      Rate and Rhythm: Normal rate and regular rhythm.      Pulses: Normal pulses.      Heart sounds: Normal heart sounds. No murmur heard.  Pulmonary:      Effort: Pulmonary effort is normal. No respiratory distress.      Breath sounds: Normal breath sounds.   Abdominal:      Palpations: Abdomen is soft.      Tenderness: There is no abdominal tenderness.    Musculoskeletal:         General: No swelling.      Cervical back: Neck supple.      Right lower leg: No edema.      Left lower leg: No edema.   Skin:     General: Skin is warm and dry.      Capillary Refill: Capillary refill takes less than 2 seconds.   Neurological:      Mental Status: He is alert.   Psychiatric:         Mood and Affect: Mood normal.       Medications:    Current Facility-Administered Medications:     acetaminophen (TYLENOL) tablet 650 mg, 650 mg, Oral, Q4H PRN, Shalom Sharp DO    allopurinol (ZYLOPRIM) tablet 300 mg, 300 mg, Oral, Daily, Debby Merritt MD, 300 mg at 12/22/23 1645    amLODIPine (NORVASC) tablet 10 mg, 10 mg, Oral, Daily, Debby Merritt MD, 10 mg at 12/22/23 1646    atorvastatin (LIPITOR) tablet 80 mg, 80 mg, Oral, Daily With Dinner, Debby Merritt MD, 80 mg at 12/22/23 1644    calcitriol (ROCALTROL) capsule 0.25 mcg, 0.25 mcg, Oral, Daily, Debby Merritt MD, 0.25 mcg at 12/22/23 1648    insulin lispro (HumaLOG) 100 units/mL subcutaneous injection 1-6 Units, 1-6 Units, Subcutaneous, TID AC **AND** Fingerstick Glucose (POCT), , , TID AC, Debby Merritt MD    labetalol (NORMODYNE) injection 10 mg, 10 mg, Intravenous, Q4H PRN, Debby Merritt MD    metoprolol succinate (TOPROL-XL) 24 hr tablet 12.5 mg, 12.5 mg, Oral, Daily, Debby Merritt MD, 12.5 mg at 12/22/23 1646    multi-electrolyte (PLASMALYTE-A/ISOLYTE-S PH 7.4) IV solution, 75 mL/hr, Intravenous, Continuous, Debby Merritt MD, Last Rate: 75 mL/hr at 12/22/23 1732, 75 mL/hr at 12/22/23 1732    nicotine (NICODERM CQ) 14 mg/24hr TD 24 hr patch 1 patch, 1 patch, Transdermal, Daily, Debby Merritt MD    ondansetron (ZOFRAN) injection 4 mg, 4 mg, Intravenous, Q6H PRN, Shalom Sharp DO    pancrelipase (Lip-Prot-Amyl) (CREON) delayed release capsule 24,000 Units, 24,000 Units, Oral, TID With Meals, Debby  "Antonieta Merritt MD    pantoprazole (PROTONIX) 80 mg in sodium chloride 0.9 % 100 mL infusion, 8 mg/hr, Intravenous, Continuous, Shannon Lewis MD, Last Rate: 10 mL/hr at 12/22/23 2333, 8 mg/hr at 12/22/23 2333    Laboratory Results:  Results from last 7 days   Lab Units 12/23/23  0451 12/22/23  2330 12/22/23  1157   WBC Thousand/uL 4.46  --  6.70   HEMOGLOBIN g/dL 6.8* 6.4* 8.2*   HEMATOCRIT % 21.6* 20.2* 26.1*   PLATELETS Thousands/uL 131*  --  180   POTASSIUM mmol/L 4.0  --  4.2   CHLORIDE mmol/L 113*  --  109*   CO2 mmol/L 24  --  24   BUN mg/dL 53*  --  67*   CREATININE mg/dL 2.31*  --  3.22*   CALCIUM mg/dL 7.9*  --  9.0   MAGNESIUM mg/dL 1.7*  --  1.9       Portions of the record may have been created with voice recognition software. Occasional wrong word or \"sound a like\" substitutions may have occurred due to the inherent limitations of voice recognition software. Read the chart carefully and recognize, using context, where substitutions have occurred. If you have any questions, please contact the dictating provider.    "

## 2023-12-23 NOTE — ANESTHESIA POSTPROCEDURE EVALUATION
Post-Op Assessment Note        BP (!) 158/49 (12/23/23 1502)    Temp 97.5 °F (36.4 °C) (12/23/23 1502)    Pulse (!) 52 (12/23/23 1502)   Resp      SpO2 95 % (12/23/23 1502)

## 2023-12-23 NOTE — ASSESSMENT & PLAN NOTE
Recent Labs     12/22/23  2330 12/23/23  0451 12/23/23  1025   HGB 6.4* 6.8* 8.0*     Plan:  S/p 2U PRBC  F/u H&H q8h  Blood transfusion if HB<7

## 2023-12-23 NOTE — PROGRESS NOTES
[facility]  Progress Note  Name: Jay Harrison I  MRN: [id number]  Unit/Bed#: W -01 I Date of Admission: 12/22/2023   Date of Service: 12/23/2023 I Hospital Day: 1    Assessment/Plan   * Melena  Assessment & Plan  Patient presented with new onset rectal bleeding, started after constipation.  He noticed splash of bright red blood, followed by dark black stools.  Previous admission on 12/5 with similar symptoms and underwent EGD  EGD 12/5/2023 showed ulcer which was clipped  C scope 12/5 showed diverticulosis only   CT abdomen 12/22/2023:    Mild wall thickening of the left and sigmoid colon may be due to colitis.  Sequela of chronic pancreatitis. A 2 cm hyperdense structure in the head of the pancreas is indeterminate but in retrospect is stable since at least 2021. This may reflect a hematoma or thrombosed pseudoaneurysm as the sequela of previous pancreatitis. Hyperdense cystic lesion is alternative possibility. Greater than 2-year stability favors benignity. A 6 to 12-month follow-up, preferably with MRI if performed, may be considered depending on comorbidities.\  In ED, S/p 1 L NSS IVF and IV pantoprazole 80mg  S/p 2U PRBC  Plan:  GI consulted, EGD planned for 1:30 today  Continue with the maintenance fluids 0.9% NaCl @75 mL/h  Continue  IV pantoprazole infusion.  Hold aspirin and Plavix and DVT prophylaxis  F/u H&H q8h  Blood transfusion if HB<7    Iron deficiency anemia  Assessment & Plan  Recent Labs     12/22/23  2330 12/23/23  0451 12/23/23  1025   HGB 6.4* 6.8* 8.0*     Plan:  S/p 2U PRBC  F/u H&H q8h  Blood transfusion if HB<7    Acute kidney injury superimposed on CKD   Assessment & Plan  Lab Results   Component Value Date    EGFR 25 12/23/2023    EGFR 17 12/22/2023    EGFR 29 12/10/2023    CREATININE 2.31 (H) 12/23/2023    CREATININE 3.22 (H) 12/22/2023    CREATININE 2.05 (H) 12/10/2023     Patient has a history of hyper parathyroidism and has been taking  calcitriol 0.25 mcg daily followed by vitamin D3 50,000 units once a week.  Etiology of elevated creatinine: History of CKD superimposed with Pre renal SHOAIB due to poor oral intake vs lisinopril vs Jardiance Vs Torsemide.    Plan:  Hold lisinopril and  Jardiance  and Torsemide.  Nephrology consulted, recs appreciated  Continue IV Plasmalyte 75 ml/hr    Hypertension  Assessment & Plan  Blood Pressure: 134/76    Plan:  Continue amlodipine and metoprolol  Medications held: Lisinopril and torsemide  Monitor blood pressure  PRN IV Labetalol  for SBP > 180      Chronic pancreatitis (HCC)  Assessment & Plan  Has been taking Creon 3 times daily with foods.  At baseline patient has 3-4 bowel movements per day, denies lactose intolerance.  Usual diet-salads, spaghetti, fiber, with consumption of 20 ounces of water/day  Denies abdominal pain, abdominal distention, bloating, weight loss    12/22/2023 CT abdomen finding: Sequela of chronic pancreatitis. A 2 cm hyperdense structure in the head of the pancreas is indeterminate but in retrospect is stable since at least 2021. This may reflect a hematoma or thrombosed pseudoaneurysm as the sequela of previous pancreatitis. Hyperdense cystic lesion is alternative possibility. Greater than 2-year stability favors benignity. A 6 to 12-month follow-up, preferably with MRI if performed, may be considered depending on comorbidities.     Plan:  NPO for EGD today  Continue Creon  Follow up incidental finding outpatient: MRI 6-12 months follow-up      Type 2 diabetes mellitus (HCC)  Assessment & Plan  Lab Results   Component Value Date    HGBA1C 6.1 (H) 12/09/2023       Recent Labs     12/22/23  1643 12/23/23  0729 12/23/23  1059   POCGLU 93 90 96       Blood Sugar Average: Last 72 hrs:  (P) 93Hold Jardiance, glimepiride, glipizide.    Plan:  Siding scale for now             VTE Pharmacologic Prophylaxis: VTE Score: 3 Moderate Risk (Score 3-4) - Pharmacological DVT Prophylaxis  Contraindicated. Sequential Compression Devices Ordered. Due to Melena    Mobility:   Basic Mobility Inpatient Raw Score: 24  JH-HLM Goal: 8: Walk 250 feet or more  JH-HLM Achieved: 7: Walk 25 feet or more  HLM Goal NOT achieved. Continue with multidisciplinary rounding and encourage appropriate mobility to improve upon HLM goals.    Patient Centered Rounds: I performed bedside rounds with nursing staff today.  Discussions with Specialists or Other Care Team Provider: GI, Attending    Education and Discussions with Family / Patient: Updated  (son) via phone.    Current Length of Stay: 1 day(s)  Current Patient Status: Inpatient   Discharge Plan:  Discharge pending EGD, possibly 24 hours    Code Status: Level 3 - DNAR and DNI    Subjective:   Overnight, hemoglobin dropped from 8.2 to 6.4, 1 unit packed red blood cells was given.  Repeat hemoglobin in a.m. was 6.8, another 1 unit of packed red blood cells were given.    Patient evaluated at bedside.  Last episode of melena was the BM at 8:30 AM yesterday that brought him into the emergency department.  Reports diffuse abdominal pain.  Denies nausea or vomiting.  Denies any chest pain or shortness of breath.    Objective:     Vitals:   Temp (24hrs), Av.6 °F (36.4 °C), Min:97.4 °F (36.3 °C), Max:98.3 °F (36.8 °C)    Temp:  [97.4 °F (36.3 °C)-98.3 °F (36.8 °C)] 97.4 °F (36.3 °C)  HR:  [51-64] 55  Resp:  [12-18] 16  BP: (116-164)/(38-76) 134/76  SpO2:  [92 %-100 %] 98 %  Body mass index is 24.42 kg/m².     Input and Output Summary (last 24 hours):     Intake/Output Summary (Last 24 hours) at 2023 1303  Last data filed at 2023 1034  Gross per 24 hour   Intake 1350 ml   Output 625 ml   Net 725 ml       Physical Exam:   Physical Exam  Vitals and nursing note reviewed.   Constitutional:       General: He is not in acute distress.     Appearance: He is well-developed. He is ill-appearing (Frail). He is not toxic-appearing or diaphoretic.   HENT:       Head: Normocephalic and atraumatic.   Eyes:      Conjunctiva/sclera: Conjunctivae normal.   Cardiovascular:      Rate and Rhythm: Normal rate and regular rhythm.      Heart sounds: No murmur heard.  Pulmonary:      Effort: Pulmonary effort is normal. No respiratory distress.      Breath sounds: Normal breath sounds.   Abdominal:      General: There is no distension.      Palpations: Abdomen is soft.      Tenderness: There is abdominal tenderness.   Musculoskeletal:         General: No swelling.      Cervical back: Neck supple.      Right lower leg: No edema.      Left lower leg: No edema.   Skin:     General: Skin is warm and dry.      Capillary Refill: Capillary refill takes less than 2 seconds.      Coloration: Skin is pale.   Neurological:      Mental Status: He is alert.   Psychiatric:         Mood and Affect: Mood normal.       Additional Data:     Labs:  Results from last 7 days   Lab Units 12/23/23  1025 12/23/23  0451   WBC Thousand/uL  --  4.46   HEMOGLOBIN g/dL 8.0* 6.8*   HEMATOCRIT % 25.4* 21.6*   PLATELETS Thousands/uL  --  131*   NEUTROS PCT %  --  63   LYMPHS PCT %  --  25   MONOS PCT %  --  7   EOS PCT %  --  5     Results from last 7 days   Lab Units 12/23/23  0451 12/22/23  1157   SODIUM mmol/L 141 141   POTASSIUM mmol/L 4.0 4.2   CHLORIDE mmol/L 113* 109*   CO2 mmol/L 24 24   BUN mg/dL 53* 67*   CREATININE mg/dL 2.31* 3.22*   ANION GAP mmol/L 4 8   CALCIUM mg/dL 7.9* 9.0   ALBUMIN g/dL  --  3.9   TOTAL BILIRUBIN mg/dL  --  0.35   ALK PHOS U/L  --  62   ALT U/L  --  6*   AST U/L  --  10*   GLUCOSE RANDOM mg/dL 93 139     Results from last 7 days   Lab Units 12/23/23  0451   INR  1.08     Results from last 7 days   Lab Units 12/23/23  1059 12/23/23  0729 12/22/23  1643   POC GLUCOSE mg/dl 96 90 93         Results from last 7 days   Lab Units 12/22/23  1157   LACTIC ACID mmol/L 1.3       Lines/Drains:  Invasive Devices       Peripheral Intravenous Line  Duration             Peripheral IV  12/22/23 Left Antecubital 1 day    Peripheral IV 12/22/23 Right;Ventral (anterior) Forearm 1 day                    Imaging: Reviewed radiology reports from this admission including: abdominal/pelvic CT  1.  Mild wall thickening of the left and sigmoid colon may be due to colitis.  2.  Circumferential bladder wall thickening. Correlate for evidence of cystitis.  3.  Cholelithiasis without evidence of acute cholecystitis.  4.  Multiple bilateral nonobstructing renal calculi.  5.  Sequela of chronic pancreatitis. A 2 cm hyperdense structure in the head of the pancreas is indeterminate but in retrospect is stable since at least 2021. This may reflect a hematoma or thrombosed pseudoaneurysm as the sequela of previous   pancreatitis. Hyperdense cystic lesion is alternative possibility. Greater than 2-year stability favors benignity. A 6 to 12-month follow-up, preferably with MRI if performed, may be considered depending on comorbidities.    Recent Cultures (last 7 days):         Last 24 Hours Medication List:   Current Facility-Administered Medications   Medication Dose Route Frequency Provider Last Rate    acetaminophen  650 mg Oral Q4H PRN Shalom Sharp DO      allopurinol  300 mg Oral Daily Debby Merritt MD      amLODIPine  10 mg Oral Daily Debby Merritt MD      atorvastatin  80 mg Oral Daily With Dinner Debby Merritt MD      calcitriol  0.25 mcg Oral Daily Debby Merritt MD      insulin lispro  1-6 Units Subcutaneous TID AC Debby Merritt MD      labetalol  10 mg Intravenous Q4H PRN Debby Merritt MD      metoprolol succinate  12.5 mg Oral Daily Debby Merritt MD      multi-electrolyte  75 mL/hr Intravenous Continuous Debby Merritt MD 75 mL/hr (12/22/23 1732)    multi-electrolyte  75 mL/hr Intravenous Continuous Lillian Sharp DO 75 mL/hr (12/23/23 1155)    nicotine  1 patch Transdermal Daily  Debby Merritt MD      ondansetron  4 mg Intravenous Q6H PRN Shalom Sharp DO      pancrelipase (Lip-Prot-Amyl)  24,000 Units Oral TID With Meals Debyb Merritt MD      pantoprazole (PROTONIX) 80 mg in sodium chloride 0.9 % 100 mL infusion  8 mg/hr Intravenous Continuous Shannon Lewis MD 8 mg/hr (12/23/23 9829)        Today, Patient Was Seen By: Lillian Sharp DO    **Please Note: This note may have been constructed using a voice recognition system.**

## 2023-12-23 NOTE — ASSESSMENT & PLAN NOTE
Has been taking Creon 3 times daily with foods.  At baseline patient has 3-4 bowel movements per day, denies lactose intolerance.  Usual diet-salads, spaghetti, fiber, with consumption of 20 ounces of water/day  Denies abdominal pain, abdominal distention, bloating, weight loss    12/22/2023 CT abdomen finding: Sequela of chronic pancreatitis. A 2 cm hyperdense structure in the head of the pancreas is indeterminate but in retrospect is stable since at least 2021. This may reflect a hematoma or thrombosed pseudoaneurysm as the sequela of previous pancreatitis. Hyperdense cystic lesion is alternative possibility. Greater than 2-year stability favors benignity. A 6 to 12-month follow-up, preferably with MRI if performed, may be considered depending on comorbidities.     Plan:  NPO for EGD today  Continue Creon  Follow up incidental finding outpatient: MRI 6-12 months follow-up

## 2023-12-24 LAB
ABO GROUP BLD BPU: NORMAL
ANION GAP SERPL CALCULATED.3IONS-SCNC: 8 MMOL/L
BPU ID: NORMAL
BUN SERPL-MCNC: 46 MG/DL (ref 5–25)
CALCIUM SERPL-MCNC: 8.6 MG/DL (ref 8.4–10.2)
CHLORIDE SERPL-SCNC: 110 MMOL/L (ref 96–108)
CO2 SERPL-SCNC: 24 MMOL/L (ref 21–32)
CREAT SERPL-MCNC: 2.12 MG/DL (ref 0.6–1.3)
CROSSMATCH: NORMAL
ERYTHROCYTE [DISTWIDTH] IN BLOOD BY AUTOMATED COUNT: 15.9 % (ref 11.6–15.1)
GFR SERPL CREATININE-BSD FRML MDRD: 28 ML/MIN/1.73SQ M
GLUCOSE SERPL-MCNC: 106 MG/DL (ref 65–140)
GLUCOSE SERPL-MCNC: 110 MG/DL (ref 65–140)
GLUCOSE SERPL-MCNC: 110 MG/DL (ref 65–140)
GLUCOSE SERPL-MCNC: 203 MG/DL (ref 65–140)
HCT VFR BLD AUTO: 24.2 % (ref 36.5–49.3)
HCT VFR BLD AUTO: 24.3 % (ref 36.5–49.3)
HCT VFR BLD AUTO: 27.1 % (ref 36.5–49.3)
HGB BLD-MCNC: 7.9 G/DL (ref 12–17)
HGB BLD-MCNC: 7.9 G/DL (ref 12–17)
HGB BLD-MCNC: 8.9 G/DL (ref 12–17)
INR PPP: 1 (ref 0.84–1.19)
MAGNESIUM SERPL-MCNC: 2 MG/DL (ref 1.9–2.7)
MCH RBC QN AUTO: 31.8 PG (ref 26.8–34.3)
MCHC RBC AUTO-ENTMCNC: 32.8 G/DL (ref 31.4–37.4)
MCV RBC AUTO: 97 FL (ref 82–98)
PLATELET # BLD AUTO: 144 THOUSANDS/UL (ref 149–390)
PMV BLD AUTO: 10.4 FL (ref 8.9–12.7)
POTASSIUM SERPL-SCNC: 4.1 MMOL/L (ref 3.5–5.3)
PROTHROMBIN TIME: 13.8 SECONDS (ref 11.6–14.5)
RBC # BLD AUTO: 2.8 MILLION/UL (ref 3.88–5.62)
SODIUM SERPL-SCNC: 142 MMOL/L (ref 135–147)
UNIT DISPENSE STATUS: NORMAL
UNIT PRODUCT CODE: NORMAL
UNIT PRODUCT VOLUME: 350 ML
UNIT RH: NORMAL
WBC # BLD AUTO: 6.95 THOUSAND/UL (ref 4.31–10.16)

## 2023-12-24 PROCEDURE — 85610 PROTHROMBIN TIME: CPT

## 2023-12-24 PROCEDURE — 85018 HEMOGLOBIN: CPT

## 2023-12-24 PROCEDURE — 99232 SBSQ HOSP IP/OBS MODERATE 35: CPT | Performed by: INTERNAL MEDICINE

## 2023-12-24 PROCEDURE — 85014 HEMATOCRIT: CPT

## 2023-12-24 PROCEDURE — 99233 SBSQ HOSP IP/OBS HIGH 50: CPT | Performed by: INTERNAL MEDICINE

## 2023-12-24 PROCEDURE — 82948 REAGENT STRIP/BLOOD GLUCOSE: CPT

## 2023-12-24 PROCEDURE — 80048 BASIC METABOLIC PNL TOTAL CA: CPT

## 2023-12-24 PROCEDURE — 85027 COMPLETE CBC AUTOMATED: CPT

## 2023-12-24 PROCEDURE — C9113 INJ PANTOPRAZOLE SODIUM, VIA: HCPCS

## 2023-12-24 PROCEDURE — 83735 ASSAY OF MAGNESIUM: CPT

## 2023-12-24 RX ORDER — CLOPIDOGREL BISULFATE 75 MG/1
75 TABLET ORAL DAILY
Status: DISCONTINUED | OUTPATIENT
Start: 2023-12-24 | End: 2023-12-25 | Stop reason: HOSPADM

## 2023-12-24 RX ORDER — SODIUM CHLORIDE, SODIUM GLUCONATE, SODIUM ACETATE, POTASSIUM CHLORIDE, MAGNESIUM CHLORIDE, SODIUM PHOSPHATE, DIBASIC, AND POTASSIUM PHOSPHATE .53; .5; .37; .037; .03; .012; .00082 G/100ML; G/100ML; G/100ML; G/100ML; G/100ML; G/100ML; G/100ML
50 INJECTION, SOLUTION INTRAVENOUS CONTINUOUS
Status: DISPENSED | OUTPATIENT
Start: 2023-12-24 | End: 2023-12-25

## 2023-12-24 RX ADMIN — PANCRELIPASE 24000 UNITS: 24000; 76000; 120000 CAPSULE, DELAYED RELEASE PELLETS ORAL at 16:54

## 2023-12-24 RX ADMIN — CALCITRIOL 0.25 MCG: 0.25 CAPSULE, LIQUID FILLED ORAL at 10:29

## 2023-12-24 RX ADMIN — PANTOPRAZOLE SODIUM 8 MG/HR: 40 INJECTION, POWDER, FOR SOLUTION INTRAVENOUS at 04:09

## 2023-12-24 RX ADMIN — ATORVASTATIN CALCIUM 80 MG: 40 TABLET, FILM COATED ORAL at 16:18

## 2023-12-24 RX ADMIN — ASPIRIN 81 MG: 81 TABLET, COATED ORAL at 13:04

## 2023-12-24 RX ADMIN — PANCRELIPASE 24000 UNITS: 24000; 76000; 120000 CAPSULE, DELAYED RELEASE PELLETS ORAL at 07:44

## 2023-12-24 RX ADMIN — METOPROLOL SUCCINATE 12.5 MG: 25 TABLET, EXTENDED RELEASE ORAL at 10:28

## 2023-12-24 RX ADMIN — AMLODIPINE BESYLATE 10 MG: 10 TABLET ORAL at 10:27

## 2023-12-24 RX ADMIN — PANTOPRAZOLE SODIUM 8 MG/HR: 40 INJECTION, POWDER, FOR SOLUTION INTRAVENOUS at 13:05

## 2023-12-24 RX ADMIN — ALLOPURINOL 300 MG: 300 TABLET ORAL at 10:26

## 2023-12-24 RX ADMIN — INSULIN LISPRO 2 UNITS: 100 INJECTION, SOLUTION INTRAVENOUS; SUBCUTANEOUS at 16:54

## 2023-12-24 RX ADMIN — PANCRELIPASE 24000 UNITS: 24000; 76000; 120000 CAPSULE, DELAYED RELEASE PELLETS ORAL at 12:33

## 2023-12-24 RX ADMIN — SODIUM CHLORIDE, SODIUM GLUCONATE, SODIUM ACETATE, POTASSIUM CHLORIDE, MAGNESIUM CHLORIDE, SODIUM PHOSPHATE, DIBASIC, AND POTASSIUM PHOSPHATE 50 ML/HR: .53; .5; .37; .037; .03; .012; .00082 INJECTION, SOLUTION INTRAVENOUS at 12:36

## 2023-12-24 RX ADMIN — CLOPIDOGREL BISULFATE 75 MG: 75 TABLET ORAL at 13:04

## 2023-12-24 NOTE — ASSESSMENT & PLAN NOTE
Recent Labs     12/23/23  1631 12/24/23  0059 12/24/23  0456   HGB 8.3* 7.9* 8.9*       Plan:  S/p 2U PRBC 12/23  F/u H&H q8h  Blood transfusion if HB<7

## 2023-12-24 NOTE — ASSESSMENT & PLAN NOTE
Recent Labs     12/22/23  1157 12/23/23  0451 12/24/23  0456    131* 144*     Continue to monitor with CBC AM  Monitor for signs of active bleeding

## 2023-12-24 NOTE — ASSESSMENT & PLAN NOTE
Has been taking Creon 3 times daily with foods.  At baseline patient has 3-4 bowel movements per day, denies lactose intolerance.  Usual diet-salads, spaghetti, fiber, with consumption of 20 ounces of water/day  Denies abdominal pain, abdominal distention, bloating, weight loss    12/22/2023 CT abdomen finding: Sequela of chronic pancreatitis. A 2 cm hyperdense structure in the head of the pancreas is indeterminate but in retrospect is stable since at least 2021. This may reflect a hematoma or thrombosed pseudoaneurysm as the sequela of previous pancreatitis. Hyperdense cystic lesion is alternative possibility. Greater than 2-year stability favors benignity. A 6 to 12-month follow-up, preferably with MRI if performed, may be considered depending on comorbidities.     Plan:  Continue nonulcerogenic diet  Continue Creon  Follow up incidental finding outpatient: MRI 6-12 months follow-up

## 2023-12-24 NOTE — INCIDENTAL FINDINGS
The following findings require follow up:  Radiographic finding   Finding:  Sequela of chronic pancreatitis. A 2 cm hyperdense structure in the head of the pancreas is indeterminate but in retrospect is stable since at least 2021. This may reflect a hematoma or thrombosed pseudoaneurysm as the sequela of previous   pancreatitis. Hyperdense cystic lesion is alternative possibility. Greater than 2-year stability favors benignity.   Follow up required: A 6 to 12-month follow-up, preferably with MRI if performed   Follow up should be done within 6-12 month(s)    Please notify the following clinician to assist with the follow up:   Dr. Simón Hadley MD (PCP)

## 2023-12-24 NOTE — ASSESSMENT & PLAN NOTE
Patient presented with new onset rectal bleeding, started after constipation.  He noticed splash of bright red blood, followed by dark black stools.  Previous admission on 12/5 with similar symptoms and underwent EGD  EGD 12/5/2023 showed ulcer which was clipped  C scope 12/5 showed diverticulosis only   CT abdomen 12/22/2023:    Mild wall thickening of the left and sigmoid colon may be due to colitis.  Sequela of chronic pancreatitis. A 2 cm hyperdense structure in the head of the pancreas is indeterminate but in retrospect is stable since at least 2021. This may reflect a hematoma or thrombosed pseudoaneurysm as the sequela of previous pancreatitis. Hyperdense cystic lesion is alternative possibility. Greater than 2-year stability favors benignity. A 6 to 12-month follow-up, preferably with MRI if performed, may be considered depending on comorbidities.\  In ED, S/p 1 L NSS IVF and IV pantoprazole 80mg  S/p 2U PRBC  EGD (12/23): Fresh blood and blood clotting in the stomach; single ulcer found with 4 clips placed and epinephrine injected  Plan:  GI following  Continue with the maintenance fluids 0.9% NaCl @75 mL/h  Continue  IV pantoprazole infusion.  Restart DAPT w/ aspirin and Plavix  F/u H&H q8h  Blood transfusion if HB<7

## 2023-12-24 NOTE — ASSESSMENT & PLAN NOTE
Lab Results   Component Value Date    HGBA1C 6.1 (H) 12/09/2023       Recent Labs     12/23/23  1059 12/23/23  1639 12/24/23  0731 12/24/23  1120   POCGLU 96 99 110 106         Blood Sugar Average: Last 72 hrs:  (P) 99Hold Jardiance, glimepiride, glipizide.    Plan:  Siding scale for now

## 2023-12-24 NOTE — PROGRESS NOTES
[facility]  Progress Note  Name: Jay Harrison I  MRN: [id number]  Unit/Bed#: W -01 I Date of Admission: 12/22/2023   Date of Service: 12/24/2023 I Hospital Day: 2    Assessment/Plan   * Melena  Assessment & Plan  Patient presented with new onset rectal bleeding, started after constipation.  He noticed splash of bright red blood, followed by dark black stools.  Previous admission on 12/5 with similar symptoms and underwent EGD  EGD 12/5/2023 showed ulcer which was clipped  C scope 12/5 showed diverticulosis only   CT abdomen 12/22/2023:    Mild wall thickening of the left and sigmoid colon may be due to colitis.  Sequela of chronic pancreatitis. A 2 cm hyperdense structure in the head of the pancreas is indeterminate but in retrospect is stable since at least 2021. This may reflect a hematoma or thrombosed pseudoaneurysm as the sequela of previous pancreatitis. Hyperdense cystic lesion is alternative possibility. Greater than 2-year stability favors benignity. A 6 to 12-month follow-up, preferably with MRI if performed, may be considered depending on comorbidities.\  In ED, S/p 1 L NSS IVF and IV pantoprazole 80mg  S/p 2U PRBC  EGD (12/23): Fresh blood and blood clotting in the stomach; single ulcer found with 4 clips placed and epinephrine injected  Plan:  GI following  Continue with the maintenance fluids 0.9% NaCl @75 mL/h  Continue  IV pantoprazole infusion.  Restart DAPT w/ aspirin and Plavix  F/u H&H q8h  Blood transfusion if HB<7    Iron deficiency anemia  Assessment & Plan  Recent Labs     12/23/23  1631 12/24/23  0059 12/24/23  0456   HGB 8.3* 7.9* 8.9*       Plan:  S/p 2U PRBC 12/23  F/u H&H q8h  Blood transfusion if HB<7    Hypertension  Assessment & Plan  Blood Pressure: (!) 147/46    Plan:  Continue amlodipine and metoprolol  Medications held: Lisinopril and torsemide for SHOAIB  Monitor blood pressure  PRN IV Labetalol  for SBP > 180      Acute kidney injury  superimposed on CKD   Assessment & Plan  Lab Results   Component Value Date    EGFR 28 12/24/2023    EGFR 25 12/23/2023    EGFR 17 12/22/2023    CREATININE 2.12 (H) 12/24/2023    CREATININE 2.31 (H) 12/23/2023    CREATININE 3.22 (H) 12/22/2023     Patient has a history of hyper parathyroidism and has been taking calcitriol 0.25 mcg daily followed by vitamin D3 50,000 units once a week.  Etiology of elevated creatinine: History of CKD superimposed with Pre renal SHOAIB due to poor oral intake vs lisinopril vs Jardiance Vs Torsemide.    Plan:  Hold lisinopril, Jardiance and Torsemide.  Trend BMP AM  Nephrology consulted, recs appreciated  Decrease IV Plasmalyte 50 ml/hr    Platelets decreased (HCC)  Assessment & Plan  Recent Labs     12/22/23  1157 12/23/23  0451 12/24/23  0456    131* 144*     Continue to monitor with CBC AM  Monitor for signs of active bleeding     Chronic pancreatitis (HCC)  Assessment & Plan  Has been taking Creon 3 times daily with foods.  At baseline patient has 3-4 bowel movements per day, denies lactose intolerance.  Usual diet-salads, spaghetti, fiber, with consumption of 20 ounces of water/day  Denies abdominal pain, abdominal distention, bloating, weight loss    12/22/2023 CT abdomen finding: Sequela of chronic pancreatitis. A 2 cm hyperdense structure in the head of the pancreas is indeterminate but in retrospect is stable since at least 2021. This may reflect a hematoma or thrombosed pseudoaneurysm as the sequela of previous pancreatitis. Hyperdense cystic lesion is alternative possibility. Greater than 2-year stability favors benignity. A 6 to 12-month follow-up, preferably with MRI if performed, may be considered depending on comorbidities.     Plan:  Continue nonulcerogenic diet  Continue Creon  Follow up incidental finding outpatient: MRI 6-12 months follow-up      Type 2 diabetes mellitus (HCC)  Assessment & Plan  Lab Results   Component Value Date    HGBA1C 6.1 (H) 12/09/2023        Recent Labs     23  1059 23  1639 23  0731 23  1120   POCGLU 96 99 110 106         Blood Sugar Average: Last 72 hrs:  (P) 99Hold Jardiance, glimepiride, glipizide.    Plan:  Siding scale for now             VTE Pharmacologic Prophylaxis: VTE Score: 3 Moderate Risk (Score 3-4) - Pharmacological DVT Prophylaxis Contraindicated. Sequential Compression Devices Ordered.    Mobility:   Basic Mobility Inpatient Raw Score: 24  JH-HLM Goal: 8: Walk 250 feet or more  JH-HLM Achieved: 7: Walk 25 feet or more  HLM Goal achieved. Continue to encourage appropriate mobility.    Patient Centered Rounds: I performed bedside rounds with nursing staff today.  Discussions with Specialists or Other Care Team Provider: GI    Education and Discussions with Family / Patient: Patient declined call to .     Current Length of Stay: 2 day(s)  Current Patient Status: Inpatient   Discharge Plan: Anticipate discharge in 24-48 hrs to home.    Code Status: Level 3 - DNAR and DNI    Subjective:   Patient examined while sitting up and eating breakfast in bed.  He reports an episode of melenic stool overnight but not further episodes.  He is tolerating food without difficulty and denies any pain, nausea/vomiting, abdominal pain or other concerning symptoms at this time.     Objective:     Vitals:   Temp (24hrs), Av.7 °F (36.5 °C), Min:97.4 °F (36.3 °C), Max:98.5 °F (36.9 °C)    Temp:  [97.4 °F (36.3 °C)-98.5 °F (36.9 °C)] 97.6 °F (36.4 °C)  HR:  [49-68] 55  Resp:  [17-20] 17  BP: (107-175)/(46-72) 147/46  SpO2:  [95 %-98 %] 98 %  Body mass index is 24.45 kg/m².     Input and Output Summary (last 24 hours):     Intake/Output Summary (Last 24 hours) at 2023 1307  Last data filed at 2023 1220  Gross per 24 hour   Intake 3787 ml   Output 905 ml   Net 2882 ml       Physical Exam:   Physical Exam  Vitals and nursing note reviewed.   Constitutional:       General: He is not in acute distress.      Appearance: He is well-developed. He is not toxic-appearing or diaphoretic. Ill appearance: Frail.  HENT:      Head: Normocephalic and atraumatic.   Eyes:      Conjunctiva/sclera: Conjunctivae normal.   Cardiovascular:      Rate and Rhythm: Normal rate and regular rhythm.      Heart sounds: No murmur heard.  Pulmonary:      Effort: Pulmonary effort is normal. No respiratory distress.      Breath sounds: Normal breath sounds.   Abdominal:      General: There is no distension.      Palpations: Abdomen is soft.      Tenderness: There is no abdominal tenderness.   Musculoskeletal:         General: No swelling.      Cervical back: Neck supple.      Right lower leg: No edema.      Left lower leg: No edema.   Skin:     General: Skin is warm and dry.      Capillary Refill: Capillary refill takes less than 2 seconds.   Neurological:      Mental Status: He is alert.   Psychiatric:         Mood and Affect: Mood normal.         Additional Data:     Labs:  Results from last 7 days   Lab Units 12/24/23 0456 12/23/23  1025 12/23/23 0451   WBC Thousand/uL 6.95  --  4.46   HEMOGLOBIN g/dL 8.9*   < > 6.8*   HEMATOCRIT % 27.1*   < > 21.6*   PLATELETS Thousands/uL 144*  --  131*   NEUTROS PCT %  --   --  63   LYMPHS PCT %  --   --  25   MONOS PCT %  --   --  7   EOS PCT %  --   --  5    < > = values in this interval not displayed.     Results from last 7 days   Lab Units 12/24/23 0456 12/23/23  0451 12/22/23  1157   SODIUM mmol/L 142   < > 141   POTASSIUM mmol/L 4.1   < > 4.2   CHLORIDE mmol/L 110*   < > 109*   CO2 mmol/L 24   < > 24   BUN mg/dL 46*   < > 67*   CREATININE mg/dL 2.12*   < > 3.22*   ANION GAP mmol/L 8   < > 8   CALCIUM mg/dL 8.6   < > 9.0   ALBUMIN g/dL  --   --  3.9   TOTAL BILIRUBIN mg/dL  --   --  0.35   ALK PHOS U/L  --   --  62   ALT U/L  --   --  6*   AST U/L  --   --  10*   GLUCOSE RANDOM mg/dL 110   < > 139    < > = values in this interval not displayed.     Results from last 7 days   Lab Units 12/24/23 0456    INR  1.00     Results from last 7 days   Lab Units 12/24/23  1120 12/24/23  0731 12/23/23  1639 12/23/23  1059 12/23/23  0729 12/22/23  1643   POC GLUCOSE mg/dl 106 110 99 96 90 93         Results from last 7 days   Lab Units 12/22/23  1157   LACTIC ACID mmol/L 1.3       Lines/Drains:  Invasive Devices       Peripheral Intravenous Line  Duration             Peripheral IV 12/22/23 Left Antecubital 2 days    Peripheral IV 12/23/23 Distal;Dorsal (posterior);Right Forearm <1 day                    Recent Cultures (last 7 days):         Last 24 Hours Medication List:   Current Facility-Administered Medications   Medication Dose Route Frequency Provider Last Rate    acetaminophen  650 mg Oral Q4H PRN Shalom Sharp DO      allopurinol  300 mg Oral Daily Debby Merritt MD      amLODIPine  10 mg Oral Daily Debby Merritt MD      aspirin  81 mg Oral Daily Jessica Aguiar MD      atorvastatin  80 mg Oral Daily With Dinner Debby Merritt MD      calcitriol  0.25 mcg Oral Daily Debby Merritt MD      clopidogrel  75 mg Oral Daily Jessica Aguiar MD      insulin lispro  1-6 Units Subcutaneous TID AC Debby Merritt MD      labetalol  10 mg Intravenous Q4H PRN Debby Merritt MD      metoprolol succinate  12.5 mg Oral Daily Debby Merritt MD      multi-electrolyte  50 mL/hr Intravenous Continuous Sabino Brown MD 50 mL/hr (12/24/23 1236)    nicotine  1 patch Transdermal Daily Debby Merritt MD      ondansetron  4 mg Intravenous Q6H PRN Shalom Sharp DO      pancrelipase (Lip-Prot-Amyl)  24,000 Units Oral TID With Meals Debby Merritt MD      pantoprazole (PROTONIX) 80 mg in sodium chloride 0.9 % 100 mL infusion  8 mg/hr Intravenous Continuous Shannon Lewis MD 8 mg/hr (12/24/23 1305)        Today, Patient Was Seen By: Jessica Aguiar MD    **Please Note: This note may have been constructed using a voice  recognition system.**

## 2023-12-24 NOTE — ASSESSMENT & PLAN NOTE
Lab Results   Component Value Date    EGFR 28 12/24/2023    EGFR 25 12/23/2023    EGFR 17 12/22/2023    CREATININE 2.12 (H) 12/24/2023    CREATININE 2.31 (H) 12/23/2023    CREATININE 3.22 (H) 12/22/2023     Patient has a history of hyper parathyroidism and has been taking calcitriol 0.25 mcg daily followed by vitamin D3 50,000 units once a week.  Etiology of elevated creatinine: History of CKD superimposed with Pre renal SHOAIB due to poor oral intake vs lisinopril vs Jardiance Vs Torsemide.    Plan:  Hold lisinopril, Jardiance and Torsemide.  Trend BMP AM  Nephrology consulted, recs appreciated  Decrease IV Plasmalyte 50 ml/hr

## 2023-12-24 NOTE — PROGRESS NOTES
Progress Note- Jay Roach Jr. 78 y.o. male MRN: [id number]    Unit/Bed#: W -01 Encounter: [id number]      Assessment and Plan:    78 year-old male with history of DM, PAD, CAD on DAPT, AS s/p TAVR, CKD, dena colectomy for perforated appendix, and recent admission for GI bleed s/p recent EGD/Colonoscopy 12/7  & treatment of an ulcer in the fundus w/ pigmented spot with cautery &4 clip placement who presented again with melena & noted to have a drop in Hgb.    #1 UGI Bleed: EGD yesterday showed fresh blood/clotting in the body of the stomach and greater curve of the stomach. Single ulcer int he greater curve of the stomach with fresh oozing treated with 4 clips and epi injection with no further bleeding. Pt reports 1 episode of melena following procedure yesterday which was likely old blood. Hgb remains stable after 2 units RBC given on admission, 8.9 this morning.    -Maintain large bore IV access  -Monitor H&H, transfuse as needed  -Continue PPI gtt (day #2)  -Ok for non ulcerogenic diet as tolerated  -Ok to resume antiplatelet therapy & monitor closely for rebleed  -If evidence of rebleed, pt will likely need angiography    ______________________________________________________________________    Subjective:     Pt seen laying in bed. Reports melena x1 after EGD last night but no further BM today. Denies any abdominal pain, nausea/vomiting. Tolerating diet.     Medication Administration - last 24 hours from 12/23/2023 1205 to 12/24/2023 1205         Date/Time Order Dose Route Action Action by     12/24/2023 0409 EST pantoprazole (PROTONIX) 80 mg in sodium chloride 0.9 % 100 mL infusion 8 mg/hr Intravenous New Bag Sarah David RN     12/24/2023 0408 EST pantoprazole (PROTONIX) 80 mg in sodium chloride 0.9 % 100 mL infusion 0 mg/hr Intravenous Stopped Sarah David RN     12/24/2023 1026 EST allopurinol (ZYLOPRIM) tablet 300 mg 300 mg Oral Given Zoë Eastman     12/24/2023 1027 EST amLODIPine  (NORVASC) tablet 10 mg 10 [facility]     12/23/2023 1726 EST atorvastatin (LIPITOR) tablet 80 mg 80 mg Oral Given [facility]     12/24/2023 1029 EST calcitriol (ROCALTROL) capsule 0.25 mcg 0.25 mcg Oral Given [facility]     12/24/2023 1028 EST metoprolol succinate (TOPROL-XL) 24 hr tablet 12.5 mg 12.5 mg Oral Given [facility]     12/24/2023 0744 EST pancrelipase (Lip-Prot-Amyl) (CREON) delayed release capsule 24,000 Units 24,000 Units Oral Given [facility]     12/23/2023 1843 EST pancrelipase (Lip-Prot-Amyl) (CREON) delayed release capsule 24,000 Units 24,000 Units Oral Given [facility]     12/24/2023 1026 EST nicotine (NICODERM CQ) 14 mg/24hr TD 24 hr patch 1 patch 1 patch Transdermal Not Given [facility]     12/24/2023 0733 EST insulin lispro (HumaLOG) 100 units/mL subcutaneous injection 1-6 Units -- Subcutaneous Not Given [facility]     12/23/2023 1644 EST insulin lispro (HumaLOG) 100 units/mL subcutaneous injection 1-6 Units -- Subcutaneous Not Given [facility]     12/23/2023 1658 EST multi-electrolyte (PLASMALYTE-A/ISOLYTE-S PH 7.4) IV solution 75 mL/hr Intravenous New Bag [facility]     12/23/2023 1406 EST EPINEPHrine PF (ADRENALIN) 1 mg/mL injection 1 mg Intramuscular Given Amarjit Summers MD     12/24/2023 0640 EST multi-electrolyte (PLASMALYTE-A/ISOLYTE-S PH 7.4) IV solution 50 mL/hr Intravenous Rate/Dose Change Sarah David RN            Objective:     Vitals: Blood pressure (!) 147/46, pulse 55, temperature 97.6 °F (36.4 °C), resp. rate 17, weight 77.3 kg (170 lb 6.7 oz), SpO2 98%.,Body mass index is 24.45 kg/m².      Intake/Output Summary (Last 24 hours) at 12/24/2023 1205  Last data filed at 12/24/2023 1001  Gross per 24 hour   Intake 3607 ml   Output 905 ml   Net 2702 ml       Physical Exam:   General Appearance: Awake and alert, in no acute distress  Abdomen: Soft, non-tender, non-distended; bowel sounds normal; no masses or no organomegaly    Invasive  Devices       Peripheral Intravenous Line  Duration             Peripheral IV 12/22/23 Left Antecubital 2 days    Peripheral IV 12/23/23 Distal;Dorsal (posterior);Right Forearm <1 day                    Lab Results:  Admission on 12/22/2023   Component Date Value    WBC 12/22/2023 6.70     RBC 12/22/2023 2.62 (L)     Hemoglobin 12/22/2023 8.2 (L)     Hematocrit 12/22/2023 26.1 (L)     MCV 12/22/2023 100 (H)     MCH 12/22/2023 31.3     MCHC 12/22/2023 31.4     RDW 12/22/2023 15.9 (H)     MPV 12/22/2023 10.3     Platelets 12/22/2023 180     nRBC 12/22/2023 0     Neutrophils Relative 12/22/2023 78 (H)     Immat GRANS % 12/22/2023 0     Lymphocytes Relative 12/22/2023 14     Monocytes Relative 12/22/2023 6     Eosinophils Relative 12/22/2023 2     Basophils Relative 12/22/2023 0     Neutrophils Absolute 12/22/2023 5.21     Immature Grans Absolute 12/22/2023 0.03     Lymphocytes Absolute 12/22/2023 0.93     Monocytes Absolute 12/22/2023 0.37     Eosinophils Absolute 12/22/2023 0.14     Basophils Absolute 12/22/2023 0.02     Sodium 12/22/2023 141     Potassium 12/22/2023 4.2     Chloride 12/22/2023 109 (H)     CO2 12/22/2023 24     ANION GAP 12/22/2023 8     BUN 12/22/2023 67 (H)     Creatinine 12/22/2023 3.22 (H)     Glucose 12/22/2023 139     Calcium 12/22/2023 9.0     AST 12/22/2023 10 (L)     ALT 12/22/2023 6 (L)     Alkaline Phosphatase 12/22/2023 62     Total Protein 12/22/2023 6.7     Albumin 12/22/2023 3.9     Total Bilirubin 12/22/2023 0.35     eGFR 12/22/2023 17     Lipase 12/22/2023 8 (L)     LACTIC ACID 12/22/2023 1.3     Protime 12/22/2023 13.4     INR 12/22/2023 0.96     PTT 12/22/2023 26     ABO Grouping 12/22/2023 A     Rh Factor 12/22/2023 Positive     Antibody Screen 12/22/2023 Negative     Specimen Expiration Date 12/22/2023 [id number]     Magnesium 12/22/2023 1.9     POC Glucose 12/22/2023 93     Hemoglobin 12/22/2023 6.4 (LL)     Hematocrit 12/22/2023 20.2 (L)     Unit Product Code 12/23/2023 [id number]      Unit Number 12/23/2023 [id number]     Unit ABO 12/23/2023 A     Unit RH 12/23/2023 POS     Crossmatch 12/23/2023 Compatible     Unit Dispense Status 12/23/2023 Presumed Trans     Unit Product Volume 12/23/2023 350     Sodium 12/23/2023 141     Potassium 12/23/2023 4.0     Chloride 12/23/2023 113 (H)     CO2 12/23/2023 24     ANION GAP 12/23/2023 4     BUN 12/23/2023 53 (H)     Creatinine 12/23/2023 2.31 (H)     Glucose 12/23/2023 93     Calcium 12/23/2023 7.9 (L)     eGFR 12/23/2023 25     Magnesium 12/23/2023 1.7 (L)     Protime 12/23/2023 14.6 (H)     INR 12/23/2023 1.08     PTT 12/23/2023 29     WBC 12/23/2023 4.46     RBC 12/23/2023 2.19 (L)     Hemoglobin 12/23/2023 6.8 (LL)     Hematocrit 12/23/2023 21.6 (L)     MCV 12/23/2023 99 (H)     MCH 12/23/2023 30.6     MCHC 12/23/2023 31.0 (L)     RDW 12/23/2023 15.2 (H)     MPV 12/23/2023 9.8     Platelets 12/23/2023 131 (L)     nRBC 12/23/2023 0     Neutrophils Relative 12/23/2023 63     Immat GRANS % 12/23/2023 0     Lymphocytes Relative 12/23/2023 25     Monocytes Relative 12/23/2023 7     Eosinophils Relative 12/23/2023 5     Basophils Relative 12/23/2023 0     Neutrophils Absolute 12/23/2023 2.79     Immature Grans Absolute 12/23/2023 0.02     Lymphocytes Absolute 12/23/2023 1.12     Monocytes Absolute 12/23/2023 0.30     Eosinophils Absolute 12/23/2023 0.21     Basophils Absolute 12/23/2023 0.02     PTH 12/23/2023 76.4     Vit D, 25-Hydroxy 12/23/2023 58.5     Unit Product Code 12/24/2023 [id number]     Unit Number 12/24/2023 [id number]     Unit ABO 12/24/2023 A     Unit RH 12/24/2023 POS     Crossmatch 12/24/2023 Compatible     Unit Dispense Status 12/24/2023 Presumed Trans     Unit Product Volume 12/24/2023 350     POC Glucose 12/23/2023 90     Hemoglobin 12/23/2023 8.0 (L)     Hematocrit 12/23/2023 25.4 (L)     POC Glucose 12/23/2023 96     Hemoglobin 12/23/2023 8.3 (L)     Hematocrit 12/23/2023 25.6 (L)     POC Glucose 12/23/2023 99      Hemoglobin 12/24/2023 7.9 (L)     Hematocrit 12/24/2023 24.2 (L)     WBC 12/24/2023 6.95     RBC 12/24/2023 2.80 (L)     Hemoglobin 12/24/2023 8.9 (L)     Hematocrit 12/24/2023 27.1 (L)     MCV 12/24/2023 97     MCH 12/24/2023 31.8     MCHC 12/24/2023 32.8     RDW 12/24/2023 15.9 (H)     Platelets 12/24/2023 144 (L)     MPV 12/24/2023 10.4     Sodium 12/24/2023 142     Potassium 12/24/2023 4.1     Chloride 12/24/2023 110 (H)     CO2 12/24/2023 24     ANION GAP 12/24/2023 8     BUN 12/24/2023 46 (H)     Creatinine 12/24/2023 2.12 (H)     Glucose 12/24/2023 110     Calcium 12/24/2023 8.6     eGFR 12/24/2023 28     Magnesium 12/24/2023 2.0     Protime 12/24/2023 13.8     INR 12/24/2023 1.00     POC Glucose 12/24/2023 110     POC Glucose 12/24/2023 106        Imaging Studies: I have personally reviewed pertinent imaging studies.

## 2023-12-24 NOTE — PLAN OF CARE
Problem: PAIN - ADULT  Goal: Verbalizes/displays adequate comfort level or baseline comfort level  Description: Interventions:  - Encourage patient to monitor pain and request assistance  - Assess pain using appropriate pain scale  - Administer analgesics based on type and severity of pain and evaluate response  - Implement non-pharmacological measures as appropriate and evaluate response  - Consider cultural and social influences on pain and pain management  - Notify physician/advanced practitioner if interventions unsuccessful or patient reports new pain  Outcome: Progressing     Problem: INFECTION - ADULT  Goal: Absence or prevention of progression during hospitalization  Description: INTERVENTIONS:  - Assess and monitor for signs and symptoms of infection  - Monitor lab/diagnostic results  - Monitor all insertion sites, i.e. indwelling lines, tubes, and drains  - Monitor endotracheal if appropriate and nasal secretions for changes in amount and color  - Royal appropriate cooling/warming therapies per order  - Administer medications as ordered  - Instruct and encourage patient and family to use good hand hygiene technique  - Identify and instruct in appropriate isolation precautions for identified infection/condition  Outcome: Progressing  Goal: Absence of fever/infection during neutropenic period  Description: INTERVENTIONS:  - Monitor WBC    Outcome: Progressing     Problem: SAFETY ADULT  Goal: Patient will remain free of falls  Description: INTERVENTIONS:  - Educate patient/family on patient safety including physical limitations  - Instruct patient to call for assistance with activity   - Consult OT/PT to assist with strengthening/mobility   - Keep Call bell within reach  - Keep bed low and locked with side rails adjusted as appropriate  - Keep care items and personal belongings within reach  - Initiate and maintain comfort rounds  - Make Fall Risk Sign visible to staff  - Offer Toileting every  Hours,  in advance of need  - Initiate/Maintain alarm  - Obtain necessary fall risk management equipment:   - Apply yellow socks and bracelet for high fall risk patients  - Consider moving patient to room near nurses station  Outcome: Progressing  Goal: Maintain or return to baseline ADL function  Description: INTERVENTIONS:  -  Assess patient's ability to carry out ADLs; assess patient's baseline for ADL function and identify physical deficits which impact ability to perform ADLs (bathing, care of mouth/teeth, toileting, grooming, dressing, etc.)  - Assess/evaluate cause of self-care deficits   - Assess range of motion  - Assess patient's mobility; develop plan if impaired  - Assess patient's need for assistive devices and provide as appropriate  - Encourage maximum independence but intervene and supervise when necessary  - Involve family in performance of ADLs  - Assess for home care needs following discharge   - Consider OT consult to assist with ADL evaluation and planning for discharge  - Provide patient education as appropriate  Outcome: Progressing  Goal: Maintains/Returns to pre admission functional level  Description: INTERVENTIONS:  - Perform AM-PAC 6 Click Basic Mobility/ Daily Activity assessment daily.  - Set and communicate daily mobility goal to care team and patient/family/caregiver.   - Collaborate with rehabilitation services on mobility goals if consulted  - Perform Range of Motion  times a day.  - Reposition patient every  hours.  - Dangle patient  times a day  - Stand patient  times a day  - Ambulate patient  times a day  - Out of bed to chair  times a day   - Out of bed for meals  times a day  - Out of bed for toileting  - Record patient progress and toleration of activity level   Outcome: Progressing     Problem: DISCHARGE PLANNING  Goal: Discharge to home or other facility with appropriate resources  Description: INTERVENTIONS:  - Identify barriers to discharge w/patient and caregiver  - Arrange for  needed discharge resources and transportation as appropriate  - Identify discharge learning needs (meds, wound care, etc.)  - Arrange for interpretive services to assist at discharge as needed  - Refer to Case Management Department for coordinating discharge planning if the patient needs post-hospital services based on physician/advanced practitioner order or complex needs related to functional status, cognitive ability, or social support system  Outcome: Progressing     Problem: Knowledge Deficit  Goal: Patient/family/caregiver demonstrates understanding of disease process, treatment plan, medications, and discharge instructions  Description: Complete learning assessment and assess knowledge base.  Interventions:  - Provide teaching at level of understanding  - Provide teaching via preferred learning methods  Outcome: Progressing

## 2023-12-24 NOTE — ASSESSMENT & PLAN NOTE
Blood Pressure: (!) 147/46    Plan:  Continue amlodipine and metoprolol  Medications held: Lisinopril and torsemide for SHOAIB  Monitor blood pressure  PRN IV Labetalol  for SBP > 180

## 2023-12-25 VITALS
HEART RATE: 62 BPM | BODY MASS INDEX: 24.25 KG/M2 | DIASTOLIC BLOOD PRESSURE: 51 MMHG | RESPIRATION RATE: 18 BRPM | OXYGEN SATURATION: 97 % | SYSTOLIC BLOOD PRESSURE: 151 MMHG | TEMPERATURE: 97.9 F | WEIGHT: 169 LBS

## 2023-12-25 PROBLEM — N18.9 ACUTE KIDNEY INJURY SUPERIMPOSED ON CKD: Status: RESOLVED | Noted: 2023-09-05 | Resolved: 2023-12-25

## 2023-12-25 PROBLEM — N17.9 ACUTE KIDNEY INJURY SUPERIMPOSED ON CKD: Status: RESOLVED | Noted: 2023-09-05 | Resolved: 2023-12-25

## 2023-12-25 LAB
ANION GAP SERPL CALCULATED.3IONS-SCNC: 5 MMOL/L
BUN SERPL-MCNC: 37 MG/DL (ref 5–25)
CALCIUM SERPL-MCNC: 8.5 MG/DL (ref 8.4–10.2)
CHLORIDE SERPL-SCNC: 113 MMOL/L (ref 96–108)
CO2 SERPL-SCNC: 25 MMOL/L (ref 21–32)
CREAT SERPL-MCNC: 2.03 MG/DL (ref 0.6–1.3)
ERYTHROCYTE [DISTWIDTH] IN BLOOD BY AUTOMATED COUNT: 15.6 % (ref 11.6–15.1)
GFR SERPL CREATININE-BSD FRML MDRD: 30 ML/MIN/1.73SQ M
GLUCOSE SERPL-MCNC: 106 MG/DL (ref 65–140)
GLUCOSE SERPL-MCNC: 118 MG/DL (ref 65–140)
GLUCOSE SERPL-MCNC: 201 MG/DL (ref 65–140)
HCT VFR BLD AUTO: 24.2 % (ref 36.5–49.3)
HCT VFR BLD AUTO: 24.3 % (ref 36.5–49.3)
HGB BLD-MCNC: 7.8 G/DL (ref 12–17)
HGB BLD-MCNC: 7.9 G/DL (ref 12–17)
INR PPP: 1.04 (ref 0.84–1.19)
MAGNESIUM SERPL-MCNC: 1.9 MG/DL (ref 1.9–2.7)
MCH RBC QN AUTO: 31.1 PG (ref 26.8–34.3)
MCHC RBC AUTO-ENTMCNC: 32.2 G/DL (ref 31.4–37.4)
MCV RBC AUTO: 96 FL (ref 82–98)
PLATELET # BLD AUTO: 126 THOUSANDS/UL (ref 149–390)
PMV BLD AUTO: 10.1 FL (ref 8.9–12.7)
POTASSIUM SERPL-SCNC: 4.1 MMOL/L (ref 3.5–5.3)
PROTHROMBIN TIME: 14.2 SECONDS (ref 11.6–14.5)
RBC # BLD AUTO: 2.51 MILLION/UL (ref 3.88–5.62)
SODIUM SERPL-SCNC: 143 MMOL/L (ref 135–147)
WBC # BLD AUTO: 5.4 THOUSAND/UL (ref 4.31–10.16)

## 2023-12-25 PROCEDURE — 85610 PROTHROMBIN TIME: CPT

## 2023-12-25 PROCEDURE — 80048 BASIC METABOLIC PNL TOTAL CA: CPT

## 2023-12-25 PROCEDURE — 99232 SBSQ HOSP IP/OBS MODERATE 35: CPT | Performed by: FAMILY MEDICINE

## 2023-12-25 PROCEDURE — C9113 INJ PANTOPRAZOLE SODIUM, VIA: HCPCS | Performed by: NURSE PRACTITIONER

## 2023-12-25 PROCEDURE — 99239 HOSP IP/OBS DSCHRG MGMT >30: CPT | Performed by: FAMILY MEDICINE

## 2023-12-25 PROCEDURE — 85018 HEMOGLOBIN: CPT

## 2023-12-25 PROCEDURE — 85014 HEMATOCRIT: CPT

## 2023-12-25 PROCEDURE — 82948 REAGENT STRIP/BLOOD GLUCOSE: CPT

## 2023-12-25 PROCEDURE — 85027 COMPLETE CBC AUTOMATED: CPT

## 2023-12-25 PROCEDURE — C9113 INJ PANTOPRAZOLE SODIUM, VIA: HCPCS

## 2023-12-25 PROCEDURE — 83735 ASSAY OF MAGNESIUM: CPT

## 2023-12-25 PROCEDURE — 99232 SBSQ HOSP IP/OBS MODERATE 35: CPT | Performed by: STUDENT IN AN ORGANIZED HEALTH CARE EDUCATION/TRAINING PROGRAM

## 2023-12-25 RX ORDER — PANTOPRAZOLE SODIUM 40 MG/10ML
40 INJECTION, POWDER, LYOPHILIZED, FOR SOLUTION INTRAVENOUS EVERY 12 HOURS SCHEDULED
Status: DISCONTINUED | OUTPATIENT
Start: 2023-12-25 | End: 2023-12-25 | Stop reason: HOSPADM

## 2023-12-25 RX ADMIN — METOPROLOL SUCCINATE 12.5 MG: 25 TABLET, EXTENDED RELEASE ORAL at 07:51

## 2023-12-25 RX ADMIN — AMLODIPINE BESYLATE 10 MG: 10 TABLET ORAL at 07:51

## 2023-12-25 RX ADMIN — ASPIRIN 81 MG: 81 TABLET, COATED ORAL at 07:51

## 2023-12-25 RX ADMIN — PANTOPRAZOLE SODIUM 40 MG: 40 INJECTION, POWDER, FOR SOLUTION INTRAVENOUS at 14:25

## 2023-12-25 RX ADMIN — PANCRELIPASE 24000 UNITS: 24000; 76000; 120000 CAPSULE, DELAYED RELEASE PELLETS ORAL at 12:02

## 2023-12-25 RX ADMIN — INSULIN LISPRO 2 UNITS: 100 INJECTION, SOLUTION INTRAVENOUS; SUBCUTANEOUS at 12:02

## 2023-12-25 RX ADMIN — PANTOPRAZOLE SODIUM 8 MG/HR: 40 INJECTION, POWDER, FOR SOLUTION INTRAVENOUS at 00:27

## 2023-12-25 RX ADMIN — CLOPIDOGREL BISULFATE 75 MG: 75 TABLET ORAL at 07:52

## 2023-12-25 RX ADMIN — PANCRELIPASE 24000 UNITS: 24000; 76000; 120000 CAPSULE, DELAYED RELEASE PELLETS ORAL at 07:50

## 2023-12-25 RX ADMIN — CALCITRIOL 0.25 MCG: 0.25 CAPSULE, LIQUID FILLED ORAL at 07:52

## 2023-12-25 RX ADMIN — ALLOPURINOL 300 MG: 300 TABLET ORAL at 07:51

## 2023-12-25 RX ADMIN — PANTOPRAZOLE SODIUM 8 MG/HR: 40 INJECTION, POWDER, FOR SOLUTION INTRAVENOUS at 12:02

## 2023-12-25 NOTE — ASSESSMENT & PLAN NOTE
Recent Labs     12/23/23  0451 12/24/23  0456 12/25/23  0441   * 144* 126*     Continue to monitor with CBC AM  Monitor for signs of active bleeding

## 2023-12-25 NOTE — PROGRESS NOTES
NEPHROLOGY PROGRESS NOTE   Jay Roach Jr. 78 y.o. male MRN: [id number]  Unit/Bed#: W -01 Encounter: [id number]  Reason for Consult: SHOAIB    ASSESSMENT AND PLAN:  79 yo with PMH of CKD G4 baseline creatinine 2.3 to 2.4 mg/dL  p/w melena.  Nephrology is consulted for management of SHOAIB    PLAN:    #Non-Oliguric KDIGO SHOAIB stage 1 on CKD G4  Etiology: Likely secondary to hemodynamic changes in the settings of melena  Baseline creatinine 2.2 to 2.4 mg/dL  Current creatinine: 2.03 mg/dL, back to baseline  Peak creatinine: 3.2 mg/dL  UA: No hematuria  Renal imaging : Nephrolithiasis, no hydronephrosis  Treatment:  No indication of dialysis at this time.  Kidney function is stable at baseline  Maintain MAP:  Over 65 mmHg if possible/avoid hypoperfusion:  Hold parameters on blood pressure medications  Avoid nephrotoxic agents such as NSAIDs, and IV contrast if possible. Avoid opioids   Adjust medications to GFR    #CKD G4  Baseline creatinine: 2.3 to 2.4 mg/dL  Etiology: Likely secondary to nephrosclerosis     # Nephrolithiasis  No hydronephrosis  Patient will require follow-up with urology and discharged    #Acid-base Disorder  serum HCO3 25 mmol/L  At goal    #Volume status/hypertension:  Volume: Euvolemic  Blood pressure: Borderline hypotension, /51  Recommend:  Low-sodium diet  Amlodipine 10 mg daily  Metoprolol 12.5 mg  Monitor urinary output      #Anemia:  Current hemoglobin:7.8  Secondary to melena possibly CKD  Treatment:  Transfuse for hemoglobin less than 7.0 per primary service      # Melena  Hemoglobin dropped  GI following  On PPI infusion    SUBJECTIVE:  Patient seen and examined at bedside. No chest pain, shortness of breath, nausea, vomiting, abdominal pain or diarrhea.    OBJECTIVE:  Current Weight: Weight - Scale: 76.7 kg (169 lb)  Vitals:    12/25/23 0728   BP: 151/51   Pulse: 62   Resp: 18   Temp: 97.9 °F (36.6 °C)   SpO2: 97%       Intake/Output Summary (Last 24 hours) at 12/25/2023  1209  Last data filed at 12/25/2023 0900  Gross per 24 hour   Intake 1140 ml   Output 400 ml   Net 740 ml     Wt Readings from Last 3 Encounters:   12/25/23 76.7 kg (169 lb)   12/15/23 77.6 kg (171 lb)   12/10/23 77.5 kg (170 lb 12.8 oz)     Temp Readings from Last 3 Encounters:   12/25/23 97.9 °F (36.6 °C)   12/15/23 97.5 °F (36.4 °C) (Temporal)   12/09/23 98.1 °F (36.7 °C) (Oral)     BP Readings from Last 3 Encounters:   12/25/23 151/51   12/15/23 (!) 138/42   12/10/23 (!) 129/46     Pulse Readings from Last 3 Encounters:   12/25/23 62   12/15/23 58   12/10/23 (!) 54        General:  no acute distress at this time  Skin:  No acute rash  Eyes:  No scleral icterus and noninjected  ENT:  mucous membranes moist  Neck:  no carotid bruits  Chest:  Clear to auscultation percussion, good respiratory effort, no use of accessory respiratory muscles  CVS:  Regular rate and rhythm without rub   Abdomen:  soft and nontender   Extremities: no significant lower extremity edema  Neuro:  No gross focality  Psych:  Alert , cooperative       Medications:    Current Facility-Administered Medications:     acetaminophen (TYLENOL) tablet 650 mg, 650 mg, Oral, Q4H PRN, Shalom Sharp DO    allopurinol (ZYLOPRIM) tablet 300 mg, 300 mg, Oral, Daily, Debby Merritt MD, 300 mg at 12/25/23 0751    amLODIPine (NORVASC) tablet 10 mg, 10 mg, Oral, Daily, Debby Merritt MD, 10 mg at 12/25/23 0751    aspirin (ECOTRIN LOW STRENGTH) EC tablet 81 mg, 81 mg, Oral, Daily, Jessica Aguiar MD, 81 mg at 12/25/23 0751    atorvastatin (LIPITOR) tablet 80 mg, 80 mg, Oral, Daily With Dinner, Debby Merritt MD, 80 mg at 12/24/23 1618    calcitriol (ROCALTROL) capsule 0.25 mcg, 0.25 mcg, Oral, Daily, Debby Merritt MD, 0.25 mcg at 12/25/23 0752    clopidogrel (PLAVIX) tablet 75 mg, 75 mg, Oral, Daily, Jessica Aguiar MD, 75 mg at 12/25/23 0752    insulin lispro (HumaLOG) 100 units/mL subcutaneous injection  1-6 Units, 1-6 Units, Subcutaneous, TID AC, 2 Units at 12/25/23 1202 **AND** Fingerstick Glucose (POCT), , , TID AC, Debby Merritt MD    labetalol (NORMODYNE) injection 10 mg, 10 mg, Intravenous, Q4H PRN, Debby Merritt MD    metoprolol succinate (TOPROL-XL) 24 hr tablet 12.5 mg, 12.5 mg, Oral, Daily, Debby Merritt MD, 12.5 mg at 12/25/23 0751    nicotine (NICODERM CQ) 14 mg/24hr TD 24 hr patch 1 patch, 1 patch, Transdermal, Daily, Debby Merritt MD    ondansetron (ZOFRAN) injection 4 mg, 4 mg, Intravenous, Q6H PRN, Shalom Sharp DO    pancrelipase (Lip-Prot-Amyl) (CREON) delayed release capsule 24,000 Units, 24,000 Units, Oral, TID With Meals, Debby Merritt MD, 24,000 Units at 12/25/23 1202    pantoprazole (PROTONIX) 80 mg in sodium chloride 0.9 % 100 mL infusion, 8 mg/hr, Intravenous, Continuous, Shannon Lewis MD, Last Rate: 10 mL/hr at 12/25/23 1202, 8 mg/hr at 12/25/23 1202    Laboratory Results:  Results from last 7 days   Lab Units 12/25/23  0441 12/25/23  0032 12/24/23  1734 12/24/23  0456 12/24/23  0059 12/23/23  1631 12/23/23  1025 12/23/23  0451 12/22/23  2330 12/22/23  1157   WBC Thousand/uL 5.40  --   --  6.95  --   --   --  4.46  --  6.70   HEMOGLOBIN g/dL 7.8* 7.9* 7.9* 8.9* 7.9* 8.3* 8.0* 6.8*   < > 8.2*   HEMATOCRIT % 24.2* 24.3* 24.3* 27.1* 24.2* 25.6* 25.4* 21.6*   < > 26.1*   PLATELETS Thousands/uL 126*  --   --  144*  --   --   --  131*  --  180   SODIUM mmol/L 143  --   --  142  --   --   --  141  --  141   POTASSIUM mmol/L 4.1  --   --  4.1  --   --   --  4.0  --  4.2   CHLORIDE mmol/L 113*  --   --  110*  --   --   --  113*  --  109*   CO2 mmol/L 25  --   --  24  --   --   --  24  --  24   BUN mg/dL 37*  --   --  46*  --   --   --  53*  --  67*   CREATININE mg/dL 2.03*  --   --  2.12*  --   --   --  2.31*  --  3.22*   CALCIUM mg/dL 8.5  --   --  8.6  --   --   --  7.9*  --  9.0   MAGNESIUM mg/dL 1.9  --   --  2.0  --   --    "--  1.7*  --  1.9    < > = values in this interval not displayed.       CT abdomen pelvis wo contrast   Final Result by Anastacio Rojas MD (12/22 1421)      1.  Mild wall thickening of the left and sigmoid colon may be due to colitis.   2.  Circumferential bladder wall thickening. Correlate for evidence of cystitis.   3.  Cholelithiasis without evidence of acute cholecystitis.   4.  Multiple bilateral nonobstructing renal calculi.   5.  Sequela of chronic pancreatitis. A 2 cm hyperdense structure in the head of the pancreas is indeterminate but in retrospect is stable since at least 2021. This may reflect a hematoma or thrombosed pseudoaneurysm as the sequela of previous    pancreatitis. Hyperdense cystic lesion is alternative possibility. Greater than 2-year stability favors benignity. A 6 to 12-month follow-up, preferably with MRI if performed, may be considered depending on comorbidities.      The study was marked in EPIC for immediate notification.         Workstation performed: [id number]             Portions of the record may have been created with voice recognition software. Occasional wrong word or \"sound a like\" substitutions may have occurred due to the inherent limitations of voice recognition software. Read the chart carefully and recognize, using context, where substitutions have occurred.    "

## 2023-12-25 NOTE — PLAN OF CARE
Problem: PAIN - ADULT  Goal: Verbalizes/displays adequate comfort level or baseline comfort level  Description: Interventions:  - Encourage patient to monitor pain and request assistance  - Assess pain using appropriate pain scale  - Administer analgesics based on type and severity of pain and evaluate response  - Implement non-pharmacological measures as appropriate and evaluate response  - Consider cultural and social influences on pain and pain management  - Notify physician/advanced practitioner if interventions unsuccessful or patient reports new pain  Outcome: Progressing     Problem: INFECTION - ADULT  Goal: Absence or prevention of progression during hospitalization  Description: INTERVENTIONS:  - Assess and monitor for signs and symptoms of infection  - Monitor lab/diagnostic results  - Monitor all insertion sites, i.e. indwelling lines, tubes, and drains  - Monitor endotracheal if appropriate and nasal secretions for changes in amount and color  - [place] appropriate cooling/warming therapies per order  - Administer medications as ordered  - Instruct and encourage patient and family to use good hand hygiene technique  - Identify and instruct in appropriate isolation precautions for identified infection/condition  Outcome: Progressing  Goal: Absence of fever/infection during neutropenic period  Description: INTERVENTIONS:  - Monitor WBC    Outcome: Progressing     Problem: SAFETY ADULT  Goal: Patient will remain free of falls  Description: INTERVENTIONS:  - Educate patient/family on patient safety including physical limitations  - Instruct patient to call for assistance with activity   - Consult OT/PT to assist with strengthening/mobility   - Keep Call bell within reach  - Keep bed low and locked with side rails adjusted as appropriate  - Keep care items and personal belongings within reach  - Initiate and maintain comfort rounds  - Make Fall Risk Sign visible to staff  - Offer Toileting every  Hours,  in advance of need  - Initiate/Maintain alarm  - Obtain necessary fall risk management equipment:   - Apply yellow socks and bracelet for high fall risk patients  - Consider moving patient to room near nurses station  Outcome: Progressing  Goal: Maintain or return to baseline ADL function  Description: INTERVENTIONS:  -  Assess patient's ability to carry out ADLs; assess patient's baseline for ADL function and identify physical deficits which impact ability to perform ADLs (bathing, care of mouth/teeth, toileting, grooming, dressing, etc.)  - Assess/evaluate cause of self-care deficits   - Assess range of motion  - Assess patient's mobility; develop plan if impaired  - Assess patient's need for assistive devices and provide as appropriate  - Encourage maximum independence but intervene and supervise when necessary  - Involve family in performance of ADLs  - Assess for home care needs following discharge   - Consider OT consult to assist with ADL evaluation and planning for discharge  - Provide patient education as appropriate  Outcome: Progressing  Goal: Maintains/Returns to pre admission functional level  Description: INTERVENTIONS:  - Perform AM-PAC 6 Click Basic Mobility/ Daily Activity assessment daily.  - Set and communicate daily mobility goal to care team and patient/family/caregiver.   - Collaborate with rehabilitation services on mobility goals if consulted  - Perform Range of Motion  times a day.  - Reposition patient every  hours.  - Dangle patient  times a day  - Stand patient  times a day  - Ambulate patient  times a day  - Out of bed to chair  times a day   - Out of bed for meals  times a day  - Out of bed for toileting  - Record patient progress and toleration of activity level   Outcome: Progressing     Problem: DISCHARGE PLANNING  Goal: Discharge to home or other facility with appropriate resources  Description: INTERVENTIONS:  - Identify barriers to discharge w/patient and caregiver  - Arrange for  needed discharge resources and transportation as appropriate  - Identify discharge learning needs (meds, wound care, etc.)  - Arrange for interpretive services to assist at discharge as needed  - Refer to Case Management Department for coordinating discharge planning if the patient needs post-hospital services based on physician/advanced practitioner order or complex needs related to functional status, cognitive ability, or social support system  Outcome: Progressing     Problem: Knowledge Deficit  Goal: Patient/family/caregiver demonstrates understanding of disease process, treatment plan, medications, and discharge instructions  Description: Complete learning assessment and assess knowledge base.  Interventions:  - Provide teaching at level of understanding  - Provide teaching via preferred learning methods  Outcome: Progressing

## 2023-12-25 NOTE — CASE MANAGEMENT
Case Management Assessment & Discharge Planning Note    Patient name Jay Roach Jr.  Location W /W -01 MRN [id number]  : 1945 Date 2023       Current Admission Date: 2023  Current Admission Diagnosis:Melena   Patient Active Problem List    Diagnosis Date Noted    Melena 2023    Chronic pancreatitis ([facility]) 2023    GI bleed 2023    Acute blood loss anemia 2023    Acute renal failure superimposed on stage 4 chronic kidney disease ([facility]) 2023    Acute kidney injury superimposed on CKD  2023    Vitamin D deficiency 2023    Dependence on respirator (ventilator) status ([facility]) 2023    Platelets decreased ([facility]) 2023    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 ([facility]) 2023    Anemia due to stage 4 chronic kidney disease  2023    Hyperuricemia 2023    Benign hypertension with chronic kidney disease, stage III ([facility]) 2022    Secondary hyperparathyroidism of renal origin ([facility]) 2022    S/P TAVR (transcatheter aortic valve replacement) 2022    Elevated serum creatinine 2022    Chronic diastolic CHF (congestive heart failure) ([facility]) 2022    Acute kidney injury superimposed on chronic kidney disease  2022    Iron deficiency anemia 2022    Persistent proteinuria, unspecified 01/10/2022    CKD (chronic kidney disease) stage 4, GFR 15-29 ml/min ([facility]) 01/10/2022    Leukopenia 01/10/2022    Hypomagnesemia 01/10/2022    Renal cyst, left 01/10/2022    GERD (gastroesophageal reflux disease)     Hyperlipidemia     Type 2 diabetes mellitus ([facility]) 2021    Sinus bradycardia 10/08/2020    Calcific tendinitis of right shoulder region 2019    Right shoulder pain 2019    Cigarette nicotine dependence with nicotine-induced disorder 10/22/2018    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    Asymptomatic bilateral carotid artery  stenosis 08/12/2016    S/P CABG (coronary artery bypass graft) 08/12/2016    Hypertension 08/12/2016    Aorto-iliac disease ([facility]) 08/12/2016    Renal artery stenosis, native, bilateral ([facility]) 08/12/2016    CAD (coronary artery disease) 08/12/2016    Progressive angina ([facility]) 08/12/2016    Atherosclerosis of native arteries of extremities with intermittent claudication, bilateral legs ([facility])     PVD (peripheral vascular disease) ([facility])     Tension headache 10/24/2013      LOS (days): 3  Geometric Mean LOS (GMLOS) (days):   Days to GMLOS:     OBJECTIVE:  PATIENT READMITTED TO HOSPITAL  Risk of Unplanned Readmission Score: 19.21         Current admission status: Inpatient       Preferred Pharmacy:   [facility] - [place] - [address]  [place] [address]  Phone: [phone] Fax: [phone]    [id number] [place] [facility] [address]  [place] [address]  Phone: [phone] Fax: [phone]    Primary Care Provider: Simón Hadley MD    Primary Insurance: MEDICARE  Secondary Insurance: COMMERCIAL MISCELLANEOUS    ASSESSMENT:  Active Health Care Proxies       Dom Roach [facility] Care Agent - Son   Primary Phone: [phone] (Mobile)          Readmission Root Cause  30 Day Readmission: Yes    Patient Information  Admitted from:: Home  Mental Status: Alert  During Assessment patient was accompanied by: Not accompanied during assessment  Assessment information provided by:: Patient  Primary Caregiver: Self  Support Systems: Self, Son, Family members  County of Residence: [place]  What city do you live in?: [place]  Home entry access options. Select all that apply.: Stairs  Number of steps to enter home.: 3 (3 CAIT the building, full flight steps up to second floor apartment)  Do the steps have railings?: Yes  Type of Current Residence: Apartment  Floor Level: 2  Upon entering residence, is there a bedroom on the main floor (no further  steps)?: Yes  Upon entering residence, is there a bathroom on the main floor (no further steps)?: Yes  Living Arrangements: Lives Alone, Other (Comment) (lives alone, however family lives in apartment on first floor)  Is patient a ?: Yes  Is patient active with [facility]: Yes  Is patient service connected?: No    Activities of Daily Living Prior to Admission  Functional Status: Independent  Completes ADLs independently?: Yes  Ambulates independently?: Yes  Does patient use assisted devices?: No  Does patient currently own DME?: Yes  What DME does the patient currently own?: [employer]  Does patient have a history of Outpatient Therapy (PT/OT)?: No  Does the patient have a history of Short-Term Rehab?: No  Does patient have a [facility]?: [facility]  Does patient currently have HHC?: No    Patient Information Continued  Income Source: SSI/SSD  Does patient have prescription coverage?: Yes  Does patient receive dialysis treatments?: No  Does patient have a history of substance abuse?: No  Does patient have a history of Mental Health Diagnosis?: No    PHQ 2/9 Screening   Reviewed PHQ 2/9 Depression Screening Score?: No    Means of Transportation  Means of Transport to Appts:: Drives Self    Housing Stability: Low Risk  (12/25/2023)    Housing Stability Vital Sign     Unable to Pay for Housing in the Last Year: No     Number of Places Lived in the Last Year: 1     Unstable Housing in the Last Year: No   Food Insecurity: No Food Insecurity (12/25/2023)    Hunger Vital Sign     Worried About Running Out of Food in the Last Year: Never true     Ran Out of Food in the Last Year: Never true   Transportation Needs: No Transportation Needs (12/25/2023)    PRAPARE - Transportation     Lack of Transportation (Medical): No     Lack of Transportation (Non-Medical): No   Utilities: Not At Risk (12/25/2023)    [facility] Utilities     Threatened with loss of utilities: No       DISCHARGE DETAILS:    Discharge  planning discussed with:: Patient  Freedom of Choice: Yes     CM contacted family/caregiver?: No- see comments (declined call to family at this time)  Were Treatment Team discharge recommendations reviewed with patient/caregiver?: Yes  Did patient/caregiver verbalize understanding of patient care needs?: Yes  Were patient/caregiver advised of the risks associated with not following Treatment Team discharge recommendations?: Yes    Requested Home Health Care         Is the patient interested in HHC at discharge?: No    DME Referral Provided  Referral made for DME?: No    Other Referral/Resources/Interventions Provided:  Interventions: None Indicated    Would you like to participate in our [facility] service program?  : No - Declined    Treatment Team Recommendation: Home  Discharge Destination Plan:: Home  Transport at Discharge : Family

## 2023-12-25 NOTE — PROGRESS NOTES
Formerly [facility]  Progress Note  Name: Jay Harrison I  MRN: [id number]  Unit/Bed#: W -01 I Date of Admission: 12/22/2023   Date of Service: 12/25/2023 I Hospital Day: 3    Assessment/Plan   * Melena  Assessment & Plan  Patient presented with new onset rectal bleeding, started after constipation.  He noticed splash of bright red blood, followed by dark black stools.  Previous admission on 12/5 with similar symptoms and underwent EGD  EGD 12/5/2023 showed ulcer which was clipped  C scope 12/5 showed diverticulosis only   CT abdomen 12/22/2023:    Mild wall thickening of the left and sigmoid colon may be due to colitis.  Sequela of chronic pancreatitis. A 2 cm hyperdense structure in the head of the pancreas is indeterminate but in retrospect is stable since at least 2021. This may reflect a hematoma or thrombosed pseudoaneurysm as the sequela of previous pancreatitis. Hyperdense cystic lesion is alternative possibility. Greater than 2-year stability favors benignity. A 6 to 12-month follow-up, preferably with MRI if performed, may be considered depending on comorbidities.\  In ED, S/p 1 L NSS IVF and IV pantoprazole 80mg  S/p 2U PRBC  EGD (12/23): Fresh blood and blood clotting in the stomach; single ulcer found with 4 clips placed and epinephrine injected  Plan:  GI following  Transition to IV pantoprazole BID, Continue PO pantoprazole BID on discharge  Restart DAPT w/ aspirin and Plavix  F/u H&H q8h  Blood transfusion if HB<7    Iron deficiency anemia  Assessment & Plan  Recent Labs     12/24/23  1734 12/25/23  0032 12/25/23  0441   HGB 7.9* 7.9* 7.8*     Plan:  S/p 2U PRBC 12/23  F/u H&H q8h  Blood transfusion if HB<7    Acute kidney injury superimposed on CKD   Assessment & Plan  Lab Results   Component Value Date    EGFR 30 12/25/2023    EGFR 28 12/24/2023    EGFR 25 12/23/2023    CREATININE 2.03 (H) 12/25/2023    CREATININE 2.12 (H) 12/24/2023    CREATININE 2.31 (H)  12/23/2023     Patient has a history of hyper parathyroidism and has been taking calcitriol 0.25 mcg daily followed by vitamin D3 50,000 units once a week.  Etiology of elevated creatinine: History of CKD superimposed with Pre renal SHOAIB due to poor oral intake vs lisinopril vs Jardiance Vs Torsemide.    Plan:  Hold lisinopril, Jardiance and Torsemide, Consider resuming 12/26  Trend BMP AM  Nephrology consulted, recs appreciated    Hypertension  Assessment & Plan  Blood Pressure: 151/51    Plan:  Continue amlodipine and metoprolol  Hold Lisinopril and torsemide, consider resuming 12/26  Monitor blood pressure  PRN IV Labetalol  for SBP > 180      Chronic pancreatitis (HCC)  Assessment & Plan  Has been taking Creon 3 times daily with foods.  At baseline patient has 3-4 bowel movements per day, denies lactose intolerance.  Usual diet-salads, spaghetti, fiber, with consumption of 20 ounces of water/day  Denies abdominal pain, abdominal distention, bloating, weight loss    12/22/2023 CT abdomen finding: Sequela of chronic pancreatitis. A 2 cm hyperdense structure in the head of the pancreas is indeterminate but in retrospect is stable since at least 2021. This may reflect a hematoma or thrombosed pseudoaneurysm as the sequela of previous pancreatitis. Hyperdense cystic lesion is alternative possibility. Greater than 2-year stability favors benignity. A 6 to 12-month follow-up, preferably with MRI if performed, may be considered depending on comorbidities.     Plan:  Continue nonulcerogenic diet  Continue Creon  Follow up incidental finding outpatient: MRI 6-12 months follow-up    Platelets decreased (HCC)  Assessment & Plan  Recent Labs     12/23/23  0451 12/24/23  0456 12/25/23  0441   * 144* 126*     Continue to monitor with CBC AM  Monitor for signs of active bleeding     Type 2 diabetes mellitus (HCC)  Assessment & Plan  Lab Results   Component Value Date    HGBA1C 6.1 (H) 12/09/2023       Recent Labs      23  1120 23  1525 23  0729 23  1105   POCGLU 106 203* 106 201*       Blood Sugar Average: Last 72 hrs:  (P) [id number] Jardiance, glimepiride, glipizide.    Plan:  Siding scale for now             VTE Pharmacologic Prophylaxis: VTE Score: 3 Moderate Risk (Score 3-4) - Pharmacological DVT Prophylaxis Contraindicated. Sequential Compression Devices Ordered. Due to Melena    Mobility:   Basic Mobility Inpatient Raw Score: 24  JH-HLM Goal: 8: Walk 250 feet or more  JH-HLM Achieved: 6: Walk 10 steps or more  HLM Goal NOT achieved. Continue with multidisciplinary rounding and encourage appropriate mobility to improve upon HLM goals.    Patient Centered Rounds: I performed bedside rounds with nursing staff today.  Discussions with Specialists or Other Care Team Provider: GI, Attending    Education and Discussions with Family / Patient: Updated  (son) via phone.    Current Length of Stay: 3 day(s)  Current Patient Status: Inpatient   Discharge Plan:  Discharge pending EGD, possibly 24 hours    Code Status: Level 3 - DNAR and DNI    Subjective:   No overnight events.     Patient evaluated at bedside.  Reports dark tarry stools, but denies any nausea or vomiting or abdominal pain.  Denies any chest pain or shortness of breath.    Objective:     Vitals:   Temp (24hrs), Av.9 °F (36.6 °C), Min:97.8 °F (36.6 °C), Max:97.9 °F (36.6 °C)    Temp:  [97.8 °F (36.6 °C)-97.9 °F (36.6 °C)] 97.9 °F (36.6 °C)  HR:  [52-62] 62  Resp:  [16-18] 18  BP: (135-151)/(51-71) 151/51  SpO2:  [94 %-98 %] 97 %  Body mass index is 24.25 kg/m².     Input and Output Summary (last 24 hours):     Intake/Output Summary (Last 24 hours) at 2023 1311  Last data filed at 2023 0900  Gross per 24 hour   Intake 960 ml   Output 400 ml   Net 560 ml       Physical Exam:   Physical Exam  Vitals and nursing note reviewed.   Constitutional:       General: He is not in acute distress.     Appearance: He  is well-developed. He is ill-appearing (Frail). He is not toxic-appearing or diaphoretic.   HENT:      Head: Normocephalic and atraumatic.   Eyes:      Conjunctiva/sclera: Conjunctivae normal.   Cardiovascular:      Rate and Rhythm: Normal rate and regular rhythm.      Heart sounds: Murmur heard.   Pulmonary:      Effort: Pulmonary effort is normal. No respiratory distress.      Breath sounds: Normal breath sounds.   Abdominal:      General: There is no distension.      Palpations: Abdomen is soft.      Tenderness: There is no abdominal tenderness.   Musculoskeletal:         General: No swelling.      Cervical back: Neck supple.      Right lower leg: Edema present.      Left lower leg: Edema present.      Comments: Trace edema   Skin:     General: Skin is warm and dry.      Capillary Refill: Capillary refill takes less than 2 seconds.      Coloration: Skin is pale.   Neurological:      Mental Status: He is alert.   Psychiatric:         Mood and Affect: Mood normal.       Additional Data:     Labs:  Results from last 7 days   Lab Units 12/25/23  0441 12/23/23  1025 12/23/23  0451   WBC Thousand/uL 5.40   < > 4.46   HEMOGLOBIN g/dL 7.8*   < > 6.8*   HEMATOCRIT % 24.2*   < > 21.6*   PLATELETS Thousands/uL 126*   < > 131*   NEUTROS PCT %  --   --  63   LYMPHS PCT %  --   --  25   MONOS PCT %  --   --  7   EOS PCT %  --   --  5    < > = values in this interval not displayed.     Results from last 7 days   Lab Units 12/25/23  0441 12/23/23  0451 12/22/23  1157   SODIUM mmol/L 143   < > 141   POTASSIUM mmol/L 4.1   < > 4.2   CHLORIDE mmol/L 113*   < > 109*   CO2 mmol/L 25   < > 24   BUN mg/dL 37*   < > 67*   CREATININE mg/dL 2.03*   < > 3.22*   ANION GAP mmol/L 5   < > 8   CALCIUM mg/dL 8.5   < > 9.0   ALBUMIN g/dL  --   --  3.9   TOTAL BILIRUBIN mg/dL  --   --  0.35   ALK PHOS U/L  --   --  62   ALT U/L  --   --  6*   AST U/L  --   --  10*   GLUCOSE RANDOM mg/dL 118   < > 139    < > = values in this interval not  displayed.     Results from last 7 days   Lab Units 12/25/23  0441   INR  1.04     Results from last 7 days   Lab Units 12/25/23  1105 12/25/23  0729 12/24/23  1525 12/24/23  1120 12/24/23  0731 12/23/23  1639 12/23/23  1059 12/23/23  0729 12/22/23  1643   POC GLUCOSE mg/dl 201* 106 203* 106 110 99 96 90 93         Results from last 7 days   Lab Units 12/22/23  1157   LACTIC ACID mmol/L 1.3       Lines/Drains:  Invasive Devices       Peripheral Intravenous Line  Duration             Peripheral IV 12/23/23 Distal;Dorsal (posterior);Right Forearm 1 day    Peripheral IV 12/25/23 Distal;Dorsal (posterior);Left Forearm <1 day                    Imaging: Reviewed radiology reports from this admission including: abdominal/pelvic CT  1.  Mild wall thickening of the left and sigmoid colon may be due to colitis.  2.  Circumferential bladder wall thickening. Correlate for evidence of cystitis.  3.  Cholelithiasis without evidence of acute cholecystitis.  4.  Multiple bilateral nonobstructing renal calculi.  5.  Sequela of chronic pancreatitis. A 2 cm hyperdense structure in the head of the pancreas is indeterminate but in retrospect is stable since at least 2021. This may reflect a hematoma or thrombosed pseudoaneurysm as the sequela of previous   pancreatitis. Hyperdense cystic lesion is alternative possibility. Greater than 2-year stability favors benignity. A 6 to 12-month follow-up, preferably with MRI if performed, may be considered depending on comorbidities.    Recent Cultures (last 7 days):         Last 24 Hours Medication List:   Current Facility-Administered Medications   Medication Dose Route Frequency Provider Last Rate    acetaminophen  650 mg Oral Q4H PRN Shalom Sharp DO      allopurinol  300 mg Oral Daily Debby Merritt MD      amLODIPine  10 mg Oral Daily Debby Merritt MD      aspirin  81 mg Oral Daily Jessica Aguiar MD      atorvastatin  80 mg Oral Daily With Dinner Debby  Antonieta Merritt MD      calcitriol  0.25 mcg Oral Daily Debby Merritt MD      clopidogrel  75 mg Oral Daily Jessica Aguiar MD      insulin lispro  1-6 Units Subcutaneous TID AC Debby Merritt MD      labetalol  10 mg Intravenous Q4H PRN Debby Merritt MD      metoprolol succinate  12.5 mg Oral Daily Debby Merritt MD      nicotine  1 patch Transdermal Daily Debby Merritt MD      ondansetron  4 mg Intravenous Q6H PRN Shalom Sharp DO      pancrelipase (Lip-Prot-Amyl)  24,000 Units Oral TID With Meals Debby Merritt MD      pantoprazole  40 mg Intravenous Q12H [facility]          Today, Patient Was Seen By: Lillian Sharp DO    **Please Note: This note may have been constructed using a voice recognition system.**

## 2023-12-25 NOTE — ASSESSMENT & PLAN NOTE
Recent Labs     12/24/23  1734 12/25/23  0032 12/25/23  0441   HGB 7.9* 7.9* 7.8*     Plan:  S/p 2U PRBC 12/23  F/u H&H q8h  Blood transfusion if HB<7

## 2023-12-25 NOTE — ASSESSMENT & PLAN NOTE
Lab Results   Component Value Date    HGBA1C 6.1 (H) 12/09/2023       Recent Labs     12/24/23  1120 12/24/23  1525 12/25/23  0729 12/25/23  1105   POCGLU 106 203* 106 201*       Blood Sugar Average: Last 72 hrs:  (P) [id number] Jardiance, glimepiride, glipizide.    Plan:  Siding scale for now

## 2023-12-25 NOTE — ASSESSMENT & PLAN NOTE
Lab Results   Component Value Date    EGFR 30 12/25/2023    EGFR 28 12/24/2023    EGFR 25 12/23/2023    CREATININE 2.03 (H) 12/25/2023    CREATININE 2.12 (H) 12/24/2023    CREATININE 2.31 (H) 12/23/2023     Patient has a history of hyper parathyroidism and has been taking calcitriol 0.25 mcg daily followed by vitamin D3 50,000 units once a week.  Etiology of elevated creatinine: History of CKD superimposed with Pre renal SHOAIB due to poor oral intake vs lisinopril vs Jardiance Vs Torsemide.    Plan:  Hold lisinopril, Jardiance and Torsemide, Consider resuming 12/26  Trend BMP AM  Nephrology consulted, recs appreciated

## 2023-12-25 NOTE — ASSESSMENT & PLAN NOTE
Blood Pressure: 151/51    Plan:  Continue amlodipine and metoprolol  Hold Lisinopril and torsemide, consider resuming 12/26  Monitor blood pressure  PRN IV Labetalol  for SBP > 180

## 2023-12-25 NOTE — DISCHARGE INSTR - AVS FIRST PAGE
Dear Jay Roach Jr.,     It was our pleasure to care for you here at [facility].  It is our hope that we were always able to exceed the expected standards for your care during your stay.  You were hospitalized due to ***.  You were cared for on the *** floor by Lillian Sharp DO under the service of Chante Duarte MD with the [facility] who covers for your primary care physician (PCP), Simón Hadley MD, while you were hospitalized.  If you have any questions or concerns related to this hospitalization, you may contact us at .  For follow up as well as any medication refills, we recommend that you follow up with your primary care physician.  A registered nurse will reach out to you by phone within a few days after your discharge to answer any additional questions that you may have after going home.  However, at this time we provide for you here, the most important instructions / recommendations at discharge:     Notable Medication Adjustments -   Start taking pantoprazole 40 mg by mouth twice daily   Hold taking Lisinopril until you see your PCP within 1 week. PCP will restart Lisinopril base on blood work done on 12/27.   Testing Required after Discharge -   Get blood work done including BMP and CBC on Wednesday, 12/27 to check hemoglobin and kidney function level  ** Please contact your PCP to request testing orders for any of the testing recommended here **  Important follow up information -   Follow up with PCP within 1 week  Follow up with with GI within 1-2 weeks  Follow up MRI of abdomen to assess pancreatic lesion in 6-12 months   Other Instructions -   Return to the ED if you develop abdominal pain or bloody vomiting, or chest pain or difficulty breathing.   Please review this entire after visit summary as additional general instructions including medication list, appointments, activity, diet, any pertinent wound  care, and other additional recommendations from your care team that may be provided for you.      Sincerely,     Lillian Jones Brouse, DO

## 2023-12-25 NOTE — ASSESSMENT & PLAN NOTE
Patient presented with new onset rectal bleeding, started after constipation.  He noticed splash of bright red blood, followed by dark black stools.  Previous admission on 12/5 with similar symptoms and underwent EGD  EGD 12/5/2023 showed ulcer which was clipped  C scope 12/5 showed diverticulosis only   CT abdomen 12/22/2023:    Mild wall thickening of the left and sigmoid colon may be due to colitis.  Sequela of chronic pancreatitis. A 2 cm hyperdense structure in the head of the pancreas is indeterminate but in retrospect is stable since at least 2021. This may reflect a hematoma or thrombosed pseudoaneurysm as the sequela of previous pancreatitis. Hyperdense cystic lesion is alternative possibility. Greater than 2-year stability favors benignity. A 6 to 12-month follow-up, preferably with MRI if performed, may be considered depending on comorbidities.\  In ED, S/p 1 L NSS IVF and IV pantoprazole 80mg  S/p 2U PRBC  EGD (12/23): Fresh blood and blood clotting in the stomach; single ulcer found with 4 clips placed and epinephrine injected  Plan:  GI following  Transition to IV pantoprazole BID, Continue PO pantoprazole BID on discharge  Restart DAPT w/ aspirin and Plavix  F/u H&H q8h  Blood transfusion if HB<7

## 2023-12-26 ENCOUNTER — TRANSITIONAL CARE MANAGEMENT (OUTPATIENT)
Dept: FAMILY MEDICINE CLINIC | Facility: CLINIC | Age: 78
End: 2023-12-26

## 2023-12-26 NOTE — ASSESSMENT & PLAN NOTE
Plan:  Resume Glimepiride   Stop Jardiance and Glipizide as patient is not currently taking prior to admission

## 2023-12-26 NOTE — ASSESSMENT & PLAN NOTE
Patient presented with new onset rectal bleeding, started after constipation.  He noticed splash of bright red blood, followed by dark black stools.  Previous admission on 12/5 with similar symptoms and underwent EGD  EGD 12/5/2023 showed ulcer which was clipped  C scope 12/5 showed diverticulosis only   CT abdomen 12/22/2023:    Mild wall thickening of the left and sigmoid colon may be due to colitis.  Sequela of chronic pancreatitis. A 2 cm hyperdense structure in the head of the pancreas is indeterminate but in retrospect is stable since at least 2021. This may reflect a hematoma or thrombosed pseudoaneurysm as the sequela of previous pancreatitis. Hyperdense cystic lesion is alternative possibility. Greater than 2-year stability favors benignity. A 6 to 12-month follow-up, preferably with MRI if performed, may be considered depending on comorbidities.\  In ED, S/p 1 L NSS IVF and IV pantoprazole 80mg  S/p 2U PRBC  EGD (12/23): Fresh blood and blood clotting in the stomach; single ulcer found with 4 clips placed and epinephrine injected  Plan:  Continue PO pantoprazole BID on discharge  Restarted DAPT w/ aspirin and Plavix while inpatient, Hemoglobin has remain stable 24 hours since starting DAPT  F/u CBC and BMP on 12/27 outpatient  Recommend follow up with GI in 1-2 weeks  Follow up with PCP within 1 week

## 2023-12-26 NOTE — ASSESSMENT & PLAN NOTE
Has been taking Creon 3 times daily with foods.  At baseline patient has 3-4 bowel movements per day, denies lactose intolerance.  Usual diet-salads, spaghetti, fiber, with consumption of 20 ounces of water/day  Denies abdominal pain, abdominal distention, bloating, weight loss    12/22/2023 CT abdomen finding: Sequela of chronic pancreatitis. A 2 cm hyperdense structure in the head of the pancreas is indeterminate but in retrospect is stable since at least 2021. This may reflect a hematoma or thrombosed pseudoaneurysm as the sequela of previous pancreatitis. Hyperdense cystic lesion is alternative possibility. Greater than 2-year stability favors benignity. A 6 to 12-month follow-up, preferably with MRI if performed, may be considered depending on comorbidities.     Plan:  Follow up incidental finding outpatient: A 6 to 12-month follow-up, preferably with MRI if performed, may be considered depending on comorbidities.

## 2023-12-26 NOTE — ASSESSMENT & PLAN NOTE
Recent Labs     12/23/23  0451 12/24/23  0456 12/25/23  0441   * 144* 126*     Continue to monitor with outpatient CBC on 12/27

## 2023-12-26 NOTE — DISCHARGE SUMMARY
[facility]  Discharge- Jay Roach Jr. 1945, 78 y.o. male MRN: [id number]  Unit/Bed#: [facility] GAL[facility] 402-01 Encounter: [id number]  Primary Care Provider: Simón Hadley MD   Date and time admitted to hospital: 12/22/2023 11:17 AM    * Melena  Assessment & Plan  Patient presented with new onset rectal bleeding, started after constipation.  He noticed splash of bright red blood, followed by dark black stools.  Previous admission on 12/5 with similar symptoms and underwent EGD  EGD 12/5/2023 showed ulcer which was clipped  C scope 12/5 showed diverticulosis only   CT abdomen 12/22/2023:    Mild wall thickening of the left and sigmoid colon may be due to colitis.  Sequela of chronic pancreatitis. A 2 cm hyperdense structure in the head of the pancreas is indeterminate but in retrospect is stable since at least 2021. This may reflect a hematoma or thrombosed pseudoaneurysm as the sequela of previous pancreatitis. Hyperdense cystic lesion is alternative possibility. Greater than 2-year stability favors benignity. A 6 to 12-month follow-up, preferably with MRI if performed, may be considered depending on comorbidities.\  In ED, S/p 1 L NSS IVF and IV pantoprazole 80mg  S/p 2U PRBC  EGD (12/23): Fresh blood and blood clotting in the stomach; single ulcer found with 4 clips placed and epinephrine injected  Plan:  Continue PO pantoprazole BID on discharge  Restarted DAPT w/ aspirin and Plavix while inpatient, Hemoglobin has remain stable 24 hours since starting DAPT  F/u CBC and BMP on 12/27 outpatient  Recommend follow up with GI in 1-2 weeks  Follow up with PCP within 1 week    Iron deficiency anemia  Assessment & Plan  Recent Labs     12/24/23  1734 12/25/23  0032 12/25/23  0441   HGB 7.9* 7.9* 7.8*     Plan:  S/p 2U PRBC 12/23  Hemoglobin remains stable    Hypertension  Assessment & Plan  Blood Pressure: 151/51    Plan:  Continue amlodipine, metoprolol, and torsemide  Hold Lisinopril, f/u BMP  on 12/27 to assess kidney function. Resume Lisinopril per PCP.      Chronic pancreatitis (HCC)  Assessment & Plan  Has been taking Creon 3 times daily with foods.  At baseline patient has 3-4 bowel movements per day, denies lactose intolerance.  Usual diet-salads, spaghetti, fiber, with consumption of 20 ounces of water/day  Denies abdominal pain, abdominal distention, bloating, weight loss    12/22/2023 CT abdomen finding: Sequela of chronic pancreatitis. A 2 cm hyperdense structure in the head of the pancreas is indeterminate but in retrospect is stable since at least 2021. This may reflect a hematoma or thrombosed pseudoaneurysm as the sequela of previous pancreatitis. Hyperdense cystic lesion is alternative possibility. Greater than 2-year stability favors benignity. A 6 to 12-month follow-up, preferably with MRI if performed, may be considered depending on comorbidities.     Plan:  Follow up incidental finding outpatient: A 6 to 12-month follow-up, preferably with MRI if performed, may be considered depending on comorbidities.    Platelets decreased (HCC)  Assessment & Plan  Recent Labs     12/23/23  0451 12/24/23  0456 12/25/23  0441   * 144* 126*     Continue to monitor with outpatient CBC on 12/27    Type 2 diabetes mellitus (HCC)  Assessment & Plan  Plan:  Resume Glimepiride   Stop Jardiance and Glipizide as patient is not currently taking prior to admission         Medical Problems       Resolved Problems  Date Reviewed: 12/24/2023            Resolved    Acute kidney injury superimposed on CKD  12/25/2023     Resolved by  Lillian Sharp DO        Discharging Resident: Lillian Sharp DO  Discharging Attending: No att. providers found  PCP: Simón Hadley MD  Admission Date:   Admission Orders (From admission, onward)       Ordered        12/22/23 1431  INPATIENT ADMISSION  Once                          Discharge Date: 12/25/23    Consultations During Hospital  Stay:  GI    Procedures Performed:   EGD on 12/23    Significant Findings / Test Results:   EGD: Fresh blood and blood clotting in the body of the stomach and greater curve of the stomach, Single ulcer in the greater curve of the stomach; placed 4 clips successfully (clips are MRI conditional)    Incidental Findings:   CT C/A/P: Sequela of chronic pancreatitis. A 2 cm hyperdense structure in the head of the pancreas is indeterminate but in retrospect is stable since at least 2021. This may reflect a hematoma or thrombosed pseudoaneurysm as the sequela of previous pancreatitis. Hyperdense cystic lesion is alternative possibility. Greater than 2-year stability favors benignity. A 6 to 12-month follow-up, preferably with MRI if performed, may be considered depending on comorbidities.  I reviewed the above mentioned incidental findings with the patient and/or family and they expressed understanding.    Test Results Pending at Discharge (will require follow up):   None     Outpatient Tests Requested:  CBC to check Hemoglobin on 12/27  BMP to check Kidney function on 12/27    Complications:  None    Reason for Admission: Melena 2/2 Upper GI bleed.     Hospital Course:   Jay Roach Jr. is a 78 y.o. male with pmhx recent GI on 12/04 with gastric ulcer s/p EGD, CAD on Plavix and aspirin, severe AS s/p TAVR, CKD, diabetes, hypertension who originally presented to the hospital on 12/22/2023 due to melena after restarting his DAPT. He was admitted with Hbg 8.2 and SHOAIB. He was started on IV Protonix gtt, GI consulted. He received 2U PRBC while inpatient. Nephrology consulted for SHOAIB management, IVF given, held Toresemide and Lisinopril on admission.     EGD shows single ulcer in greater cureve of stomach, placed 4 clips successfully. He was transitioned off the Protonix gtt to Protonix IV BID and DAPT was restarted while inpatient. He remains hemodynamically stable with resolution of his SHOAIB.  Patient is discharged with  continuing p.o. Protonix twice daily.  Torsemide was restarted, however lisinopril was held due to SHOAIB that has since resolved.  Repeat CBC and CMP outpatient on 12/7 that should be followed up by his PCP.  He should follow-up with his PCP for recommendations of when to start his lisinopril.    Of note, CT abdomen showed incidental findings of chronic pancreatitis with a 2 cm hyperdense structure in the head of the pancreas is indeterminate but in retrospect is stable since at least 2021, recommend a 6-12 month MRI follow-up outpatient.       Please see above list of diagnoses and related plan for additional information.     Condition at Discharge: good    Discharge Day Visit / Exam:   * Please refer to separate progress note for these details *    Discussion with Family: Updated  (son) via phone.    Discharge instructions/Information to patient and family:   See after visit summary for information provided to patient and family.      Provisions for Follow-Up Care:  See after visit summary for information related to follow-up care and any pertinent home health orders.      Mobility at time of Discharge:   Basic Mobility Inpatient Raw Score: 24  JH-HLM Goal: 8: Walk 250 feet or more  JH-HLM Achieved: 6: Walk 10 steps or more  HLM Goal achieved. Continue to encourage appropriate mobility.     Disposition:   Home    Planned Readmission: No    Discharge Medications:  See after visit summary for reconciled discharge medications provided to patient and/or family.      **Please Note: This note may have been constructed using a voice recognition system**

## 2023-12-26 NOTE — ASSESSMENT & PLAN NOTE
Blood Pressure: 151/51    Plan:  Continue amlodipine, metoprolol, and torsemide  Hold Lisinopril, f/u BMP on 12/27 to assess kidney function. Resume Lisinopril per PCP.

## 2023-12-26 NOTE — ASSESSMENT & PLAN NOTE
Recent Labs     12/24/23  1734 12/25/23  0032 12/25/23  0441   HGB 7.9* 7.9* 7.8*     Plan:  S/p 2U PRBC 12/23  Hemoglobin remains stable

## 2023-12-28 ENCOUNTER — APPOINTMENT (OUTPATIENT)
Dept: LAB | Facility: MEDICAL CENTER | Age: 78
End: 2023-12-28
Payer: MEDICARE

## 2023-12-28 ENCOUNTER — OFFICE VISIT (OUTPATIENT)
Dept: FAMILY MEDICINE CLINIC | Facility: CLINIC | Age: 78
End: 2023-12-28
Payer: MEDICARE

## 2023-12-28 VITALS
HEART RATE: 61 BPM | SYSTOLIC BLOOD PRESSURE: 134 MMHG | TEMPERATURE: 97.2 F | DIASTOLIC BLOOD PRESSURE: 60 MMHG | HEIGHT: 70 IN | WEIGHT: 166 LBS | OXYGEN SATURATION: 99 % | BODY MASS INDEX: 23.77 KG/M2

## 2023-12-28 DIAGNOSIS — K92.2 GASTRIC BLEEDING: ICD-10-CM

## 2023-12-28 DIAGNOSIS — K92.1 MELENA: ICD-10-CM

## 2023-12-28 DIAGNOSIS — N17.9 ACUTE KIDNEY INJURY SUPERIMPOSED ON CHRONIC KIDNEY DISEASE: ICD-10-CM

## 2023-12-28 DIAGNOSIS — N18.9 ACUTE KIDNEY INJURY SUPERIMPOSED ON CHRONIC KIDNEY DISEASE: ICD-10-CM

## 2023-12-28 DIAGNOSIS — Z76.89 ENCOUNTER FOR SUPPORT AND COORDINATION OF TRANSITION OF CARE: Primary | ICD-10-CM

## 2023-12-28 LAB
ANION GAP SERPL CALCULATED.3IONS-SCNC: 11 MMOL/L
BUN SERPL-MCNC: 32 MG/DL (ref 5–25)
CALCIUM SERPL-MCNC: 9.8 MG/DL (ref 8.4–10.2)
CHLORIDE SERPL-SCNC: 106 MMOL/L (ref 96–108)
CO2 SERPL-SCNC: 27 MMOL/L (ref 21–32)
CREAT SERPL-MCNC: 2.41 MG/DL (ref 0.6–1.3)
ERYTHROCYTE [DISTWIDTH] IN BLOOD BY AUTOMATED COUNT: 16.2 % (ref 11.6–15.1)
GFR SERPL CREATININE-BSD FRML MDRD: 24 ML/MIN/1.73SQ M
GLUCOSE P FAST SERPL-MCNC: 91 MG/DL (ref 65–99)
HCT VFR BLD AUTO: 29.5 % (ref 36.5–49.3)
HGB BLD-MCNC: 9.3 G/DL (ref 12–17)
MCH RBC QN AUTO: 31.3 PG (ref 26.8–34.3)
MCHC RBC AUTO-ENTMCNC: 31.5 G/DL (ref 31.4–37.4)
MCV RBC AUTO: 99 FL (ref 82–98)
PLATELET # BLD AUTO: 173 THOUSANDS/UL (ref 149–390)
PMV BLD AUTO: 10.6 FL (ref 8.9–12.7)
POTASSIUM SERPL-SCNC: 4 MMOL/L (ref 3.5–5.3)
RBC # BLD AUTO: 2.97 MILLION/UL (ref 3.88–5.62)
SODIUM SERPL-SCNC: 144 MMOL/L (ref 135–147)
WBC # BLD AUTO: 6.31 THOUSAND/UL (ref 4.31–10.16)

## 2023-12-28 PROCEDURE — 85027 COMPLETE CBC AUTOMATED: CPT

## 2023-12-28 PROCEDURE — 80048 BASIC METABOLIC PNL TOTAL CA: CPT

## 2023-12-28 PROCEDURE — 36415 COLL VENOUS BLD VENIPUNCTURE: CPT

## 2023-12-28 PROCEDURE — 99215 OFFICE O/P EST HI 40 MIN: CPT | Performed by: NURSE PRACTITIONER

## 2023-12-28 NOTE — PROGRESS NOTES
Assessment/Plan:      Diagnoses and all orders for this visit:    Encounter for support and coordination of transition of care    Gastric bleeding        Transition care completed.  Vital signs reviewed found to be stable.  Most recent labs were also again reviewed patient is still remaining anemia last hemoglobin is 7.9.  Patient is advised to complete daily activities with care stay hydrated.  Patient is going to complete a CBC and BMP prior to his specialty visit follow-up.  He denies any recent falls.  He denies any recent need for home health or PT OT.  Did advise any changes in bowel or bladder habits is to contact me in the otherwise all questions were answered medications were reviewed found to be updated  Subjective:     Patient ID: Jay Roach Jr. is a 78 y.o. male.    Patient is here for transition of care management visit within 7 days from the release from the hospital for acute bleeding patient does have follow-up appointments with heart specialists and lab work that needs to be completed prior to speciality visit. States he is weak but holding his own. Notes his stool is back to somewhat normal color        Review of Systems   Constitutional:  Negative for appetite change and fever.   HENT:  Negative for congestion and trouble swallowing.    Respiratory:  Negative for shortness of breath.    Cardiovascular:  Negative for chest pain.   Gastrointestinal:  Negative for abdominal pain.   Genitourinary:  Negative for difficulty urinating.   Musculoskeletal:  Negative for myalgias.   Skin:  Positive for pallor.   Neurological:  Positive for weakness. Negative for dizziness.   Psychiatric/Behavioral:  The patient is not nervous/anxious.          Objective:     Physical Exam  Vitals and nursing note reviewed.   Constitutional:       General: He is not in acute distress.     Appearance: He is well-developed.   HENT:      Head: Normocephalic.   Eyes:      Pupils: Pupils are equal, round, and reactive to  light.   Cardiovascular:      Rate and Rhythm: Normal rate and regular rhythm.      Heart sounds: Normal heart sounds.   Pulmonary:      Effort: Pulmonary effort is normal.      Breath sounds: Normal breath sounds.   Abdominal:      General: Bowel sounds are normal.      Palpations: Abdomen is soft.   Musculoskeletal:      Cervical back: Neck supple.   Skin:     General: Skin is warm and dry.   Neurological:      Mental Status: He is alert and oriented to person, place, and time.   Psychiatric:         Behavior: Behavior normal.         Thought Content: Thought content normal.         Judgment: Judgment normal.         TCM Call       Date and time call was made  12/26/2023 11:28 AM    Hospital care reviewed  Records reviewed    Patient was hospitialized at  [facility]    Date of Admission  12/22/23    Date of discharge  12/25/23    Diagnosis  Melena    Disposition  Home    Were the patients medications reviewed and updated  Yes    Current Symptoms  None    Weakness severity  Mild    Dizziness severity  Mild    Quality Character  Loss of balance    Episode pattern  Rare    Cause  No known event          TCM Call       Post hospital issues  None    Should patient be enrolled in anticoag monitoring?  No    Scheduled for follow up?  No    Patients specialists  Cardiologist    Cardiologist name  Dr. Olmedo or Sarah Mckeon    Nephrologist name  Marcel Muñoz MD    Did you obtain your prescribed medications  Yes    Do you need help managing your prescriptions or medications  No    Is transportation to your appointment needed  No    I have advised the patient to call PCP with any new or worsening symptoms  Uzma gregory CMA    Living Arrangements  Alone    Support System  Family; Friends    The type of support provided  Emotional; Physical; Financial    Do you have social support  Yes, as much as I need    Comment  Pt lives alone, Niece lives in apartment below, brother lives in house behind his.    Are you  recieving any outpatient services  No    Are you recieving home care services  No    Types of home care services  Nurse visit    Are you using any community resources  No    Current waiver services  No    Have you fallen in the last 12 months  No    Interperter language line needed  No

## 2024-01-05 NOTE — RESULT ENCOUNTER NOTE
Renal function acceptable at cr 2.4. will discuss during office visit   Recent hospital admission - had SHOAIB in the setting of melena.

## 2024-01-08 ENCOUNTER — APPOINTMENT (OUTPATIENT)
Dept: LAB | Facility: MEDICAL CENTER | Age: 79
DRG: 378 | End: 2024-01-08
Payer: MEDICARE

## 2024-01-08 DIAGNOSIS — I12.9 BENIGN HYPERTENSION WITH CHRONIC KIDNEY DISEASE, STAGE IV (HCC): ICD-10-CM

## 2024-01-08 DIAGNOSIS — N18.4 CONTROLLED TYPE 2 DIABETES MELLITUS WITH STAGE 4 CHRONIC KIDNEY DISEASE, WITHOUT LONG-TERM CURRENT USE OF INSULIN (HCC): ICD-10-CM

## 2024-01-08 DIAGNOSIS — I50.32 CHRONIC DIASTOLIC CHF (CONGESTIVE HEART FAILURE) (HCC): ICD-10-CM

## 2024-01-08 DIAGNOSIS — N25.81 SECONDARY HYPERPARATHYROIDISM OF RENAL ORIGIN (HCC): ICD-10-CM

## 2024-01-08 DIAGNOSIS — D50.9 IRON DEFICIENCY ANEMIA, UNSPECIFIED IRON DEFICIENCY ANEMIA TYPE: ICD-10-CM

## 2024-01-08 DIAGNOSIS — N18.4 CKD (CHRONIC KIDNEY DISEASE) STAGE 4, GFR 15-29 ML/MIN (HCC): ICD-10-CM

## 2024-01-08 DIAGNOSIS — E11.22 CONTROLLED TYPE 2 DIABETES MELLITUS WITH STAGE 4 CHRONIC KIDNEY DISEASE, WITHOUT LONG-TERM CURRENT USE OF INSULIN (HCC): ICD-10-CM

## 2024-01-08 DIAGNOSIS — N18.4 BENIGN HYPERTENSION WITH CHRONIC KIDNEY DISEASE, STAGE IV (HCC): ICD-10-CM

## 2024-01-08 DIAGNOSIS — E79.0 HYPERURICEMIA: ICD-10-CM

## 2024-01-08 DIAGNOSIS — R80.1 PERSISTENT PROTEINURIA, UNSPECIFIED: ICD-10-CM

## 2024-01-08 DIAGNOSIS — E55.9 VITAMIN D DEFICIENCY: ICD-10-CM

## 2024-01-08 LAB
25(OH)D3 SERPL-MCNC: 64 NG/ML (ref 30–100)
ANION GAP SERPL CALCULATED.3IONS-SCNC: 12 MMOL/L
BACTERIA UR QL AUTO: ABNORMAL /HPF
BILIRUB UR QL STRIP: NEGATIVE
BUN SERPL-MCNC: 47 MG/DL (ref 5–25)
CALCIUM SERPL-MCNC: 9.2 MG/DL (ref 8.4–10.2)
CHLORIDE SERPL-SCNC: 104 MMOL/L (ref 96–108)
CLARITY UR: CLEAR
CO2 SERPL-SCNC: 28 MMOL/L (ref 21–32)
COLOR UR: ABNORMAL
CREAT SERPL-MCNC: 2.61 MG/DL (ref 0.6–1.3)
CREAT UR-MCNC: 92.5 MG/DL
CREAT UR-MCNC: 93.8 MG/DL
ERYTHROCYTE [DISTWIDTH] IN BLOOD BY AUTOMATED COUNT: 15.6 % (ref 11.6–15.1)
FERRITIN SERPL-MCNC: 23 NG/ML (ref 24–336)
GFR SERPL CREATININE-BSD FRML MDRD: 22 ML/MIN/1.73SQ M
GLUCOSE P FAST SERPL-MCNC: 150 MG/DL (ref 65–99)
GLUCOSE UR STRIP-MCNC: NEGATIVE MG/DL
HCT VFR BLD AUTO: 32.2 % (ref 36.5–49.3)
HGB BLD-MCNC: 10 G/DL (ref 12–17)
HGB UR QL STRIP.AUTO: NEGATIVE
IRON SATN MFR SERPL: 12 % (ref 15–50)
IRON SERPL-MCNC: 40 UG/DL (ref 50–212)
KETONES UR STRIP-MCNC: NEGATIVE MG/DL
LEUKOCYTE ESTERASE UR QL STRIP: NEGATIVE
MAGNESIUM SERPL-MCNC: 2.2 MG/DL (ref 1.9–2.7)
MCH RBC QN AUTO: 30.9 PG (ref 26.8–34.3)
MCHC RBC AUTO-ENTMCNC: 31.1 G/DL (ref 31.4–37.4)
MCV RBC AUTO: 99 FL (ref 82–98)
MICROALBUMIN UR-MCNC: 87.4 MG/L
MICROALBUMIN/CREAT 24H UR: 93 MG/G CREATININE (ref 0–30)
NITRITE UR QL STRIP: NEGATIVE
NON-SQ EPI CELLS URNS QL MICRO: ABNORMAL /HPF
PH UR STRIP.AUTO: 5.5 [PH]
PHOSPHATE SERPL-MCNC: 4 MG/DL (ref 2.3–4.1)
PLATELET # BLD AUTO: 207 THOUSANDS/UL (ref 149–390)
PMV BLD AUTO: 10.9 FL (ref 8.9–12.7)
POTASSIUM SERPL-SCNC: 4.1 MMOL/L (ref 3.5–5.3)
PROT UR STRIP-MCNC: ABNORMAL MG/DL
PROT UR-MCNC: 28 MG/DL
PROT/CREAT UR: 0.3 MG/G{CREAT} (ref 0–0.1)
PTH-INTACT SERPL-MCNC: 111.3 PG/ML (ref 12–88)
RBC # BLD AUTO: 3.24 MILLION/UL (ref 3.88–5.62)
RBC #/AREA URNS AUTO: ABNORMAL /HPF
SODIUM SERPL-SCNC: 144 MMOL/L (ref 135–147)
SP GR UR STRIP.AUTO: 1.01 (ref 1–1.03)
TIBC SERPL-MCNC: 326 UG/DL (ref 250–450)
UIBC SERPL-MCNC: 286 UG/DL (ref 155–355)
URATE SERPL-MCNC: 4.9 MG/DL (ref 3.5–8.5)
UROBILINOGEN UR STRIP-ACNC: <2 MG/DL
WBC # BLD AUTO: 6.93 THOUSAND/UL (ref 4.31–10.16)
WBC #/AREA URNS AUTO: ABNORMAL /HPF

## 2024-01-08 PROCEDURE — 83540 ASSAY OF IRON: CPT

## 2024-01-08 PROCEDURE — 80048 BASIC METABOLIC PNL TOTAL CA: CPT

## 2024-01-08 PROCEDURE — 82043 UR ALBUMIN QUANTITATIVE: CPT

## 2024-01-08 PROCEDURE — 82728 ASSAY OF FERRITIN: CPT

## 2024-01-08 PROCEDURE — 82306 VITAMIN D 25 HYDROXY: CPT

## 2024-01-08 PROCEDURE — 82570 ASSAY OF URINE CREATININE: CPT

## 2024-01-08 PROCEDURE — 84100 ASSAY OF PHOSPHORUS: CPT

## 2024-01-08 PROCEDURE — 83550 IRON BINDING TEST: CPT

## 2024-01-08 PROCEDURE — 81001 URINALYSIS AUTO W/SCOPE: CPT

## 2024-01-08 PROCEDURE — 84550 ASSAY OF BLOOD/URIC ACID: CPT

## 2024-01-08 PROCEDURE — 84156 ASSAY OF PROTEIN URINE: CPT

## 2024-01-08 PROCEDURE — 83970 ASSAY OF PARATHORMONE: CPT

## 2024-01-08 PROCEDURE — 85027 COMPLETE CBC AUTOMATED: CPT

## 2024-01-08 PROCEDURE — 83735 ASSAY OF MAGNESIUM: CPT

## 2024-01-08 PROCEDURE — 36415 COLL VENOUS BLD VENIPUNCTURE: CPT

## 2024-01-10 ENCOUNTER — OFFICE VISIT (OUTPATIENT)
Dept: GASTROENTEROLOGY | Facility: CLINIC | Age: 79
End: 2024-01-10
Payer: MEDICARE

## 2024-01-10 ENCOUNTER — TELEPHONE (OUTPATIENT)
Age: 79
End: 2024-01-10

## 2024-01-10 ENCOUNTER — HOSPITAL ENCOUNTER (INPATIENT)
Facility: HOSPITAL | Age: 79
LOS: 2 days | Discharge: HOME | DRG: 378 | End: 2024-01-13
Attending: EMERGENCY MEDICINE | Admitting: HOSPITALIST
Payer: MEDICARE

## 2024-01-10 VITALS
BODY MASS INDEX: 24.14 KG/M2 | SYSTOLIC BLOOD PRESSURE: 126 MMHG | DIASTOLIC BLOOD PRESSURE: 42 MMHG | WEIGHT: 168.6 LBS | HEIGHT: 70 IN | HEART RATE: 75 BPM

## 2024-01-10 DIAGNOSIS — K25.4 GASTROINTESTINAL HEMORRHAGE ASSOCIATED WITH GASTRIC ULCER: ICD-10-CM

## 2024-01-10 DIAGNOSIS — D62 ACUTE BLOOD LOSS ANEMIA: ICD-10-CM

## 2024-01-10 DIAGNOSIS — D69.6 THROMBOCYTOPENIA, UNSPECIFIED (HCC): ICD-10-CM

## 2024-01-10 DIAGNOSIS — K27.9 PEPTIC ULCER: ICD-10-CM

## 2024-01-10 DIAGNOSIS — K92.2 GASTROINTESTINAL HEMORRHAGE, UNSPECIFIED GASTROINTESTINAL HEMORRHAGE TYPE: ICD-10-CM

## 2024-01-10 DIAGNOSIS — Z12.11 COLON CANCER SCREENING: ICD-10-CM

## 2024-01-10 DIAGNOSIS — K21.9 GASTROESOPHAGEAL REFLUX DISEASE WITHOUT ESOPHAGITIS: Primary | ICD-10-CM

## 2024-01-10 DIAGNOSIS — K92.2 GI BLEEDING: Primary | ICD-10-CM

## 2024-01-10 DIAGNOSIS — K86.1 OTHER CHRONIC PANCREATITIS (HCC): ICD-10-CM

## 2024-01-10 PROBLEM — D64.9 ANEMIA: Status: ACTIVE | Noted: 2024-01-10

## 2024-01-10 PROBLEM — Z87.19 HISTORY OF GI BLEED: Status: ACTIVE | Noted: 2024-01-10

## 2024-01-10 LAB
ABO GROUP BLD: NORMAL
ALBUMIN SERPL BCP-MCNC: 3.8 G/DL (ref 3.5–5)
ALP SERPL-CCNC: 61 U/L (ref 34–104)
ALT SERPL W P-5'-P-CCNC: 5 U/L (ref 7–52)
ANION GAP SERPL CALCULATED.3IONS-SCNC: 8 MMOL/L
AST SERPL W P-5'-P-CCNC: 10 U/L (ref 13–39)
BASOPHILS # BLD AUTO: 0.02 THOUSANDS/ÂΜL (ref 0–0.1)
BASOPHILS NFR BLD AUTO: 0 % (ref 0–1)
BILIRUB SERPL-MCNC: 0.34 MG/DL (ref 0.2–1)
BLD GP AB SCN SERPL QL: NEGATIVE
BUN SERPL-MCNC: 55 MG/DL (ref 5–25)
CALCIUM SERPL-MCNC: 9.1 MG/DL (ref 8.4–10.2)
CHLORIDE SERPL-SCNC: 105 MMOL/L (ref 96–108)
CO2 SERPL-SCNC: 27 MMOL/L (ref 21–32)
CREAT SERPL-MCNC: 2.97 MG/DL (ref 0.6–1.3)
EOSINOPHIL # BLD AUTO: 0.15 THOUSAND/ÂΜL (ref 0–0.61)
EOSINOPHIL NFR BLD AUTO: 3 % (ref 0–6)
ERYTHROCYTE [DISTWIDTH] IN BLOOD BY AUTOMATED COUNT: 15.7 % (ref 11.6–15.1)
EXT FECAL OCCULT BLOOD SCREEN: POSITIVE
EXT. CONTROL: ABNORMAL
GFR SERPL CREATININE-BSD FRML MDRD: 18 ML/MIN/1.73SQ M
GLUCOSE SERPL-MCNC: 109 MG/DL (ref 65–140)
GLUCOSE SERPL-MCNC: 112 MG/DL (ref 65–140)
GLUCOSE SERPL-MCNC: 134 MG/DL (ref 65–140)
HCT VFR BLD AUTO: 27.3 % (ref 36.5–49.3)
HGB BLD-MCNC: 8.9 G/DL (ref 12–17)
IMM GRANULOCYTES # BLD AUTO: 0.04 THOUSAND/UL (ref 0–0.2)
IMM GRANULOCYTES NFR BLD AUTO: 1 % (ref 0–2)
LIPASE SERPL-CCNC: <6 U/L (ref 11–82)
LYMPHOCYTES # BLD AUTO: 1.15 THOUSANDS/ÂΜL (ref 0.6–4.47)
LYMPHOCYTES NFR BLD AUTO: 19 % (ref 14–44)
MCH RBC QN AUTO: 32.1 PG (ref 26.8–34.3)
MCHC RBC AUTO-ENTMCNC: 32.6 G/DL (ref 31.4–37.4)
MCV RBC AUTO: 99 FL (ref 82–98)
MONOCYTES # BLD AUTO: 0.43 THOUSAND/ÂΜL (ref 0.17–1.22)
MONOCYTES NFR BLD AUTO: 7 % (ref 4–12)
NEUTROPHILS # BLD AUTO: 4.26 THOUSANDS/ÂΜL (ref 1.85–7.62)
NEUTS SEG NFR BLD AUTO: 70 % (ref 43–75)
NRBC BLD AUTO-RTO: 0 /100 WBCS
PLATELET # BLD AUTO: 179 THOUSANDS/UL (ref 149–390)
PMV BLD AUTO: 9.5 FL (ref 8.9–12.7)
POTASSIUM SERPL-SCNC: 4.1 MMOL/L (ref 3.5–5.3)
PROT SERPL-MCNC: 6.5 G/DL (ref 6.4–8.4)
RBC # BLD AUTO: 2.77 MILLION/UL (ref 3.88–5.62)
RH BLD: POSITIVE
SODIUM SERPL-SCNC: 140 MMOL/L (ref 135–147)
SPECIMEN EXPIRATION DATE: NORMAL
WBC # BLD AUTO: 6.05 THOUSAND/UL (ref 4.31–10.16)

## 2024-01-10 PROCEDURE — 83690 ASSAY OF LIPASE: CPT | Performed by: EMERGENCY MEDICINE

## 2024-01-10 PROCEDURE — 96374 THER/PROPH/DIAG INJ IV PUSH: CPT

## 2024-01-10 PROCEDURE — 85025 COMPLETE CBC W/AUTO DIFF WBC: CPT | Performed by: EMERGENCY MEDICINE

## 2024-01-10 PROCEDURE — 86850 RBC ANTIBODY SCREEN: CPT

## 2024-01-10 PROCEDURE — 99285 EMERGENCY DEPT VISIT HI MDM: CPT

## 2024-01-10 PROCEDURE — 86900 BLOOD TYPING SEROLOGIC ABO: CPT

## 2024-01-10 PROCEDURE — C9113 INJ PANTOPRAZOLE SODIUM, VIA: HCPCS

## 2024-01-10 PROCEDURE — 99223 1ST HOSP IP/OBS HIGH 75: CPT | Performed by: INTERNAL MEDICINE

## 2024-01-10 PROCEDURE — 82948 REAGENT STRIP/BLOOD GLUCOSE: CPT

## 2024-01-10 PROCEDURE — 36415 COLL VENOUS BLD VENIPUNCTURE: CPT | Performed by: EMERGENCY MEDICINE

## 2024-01-10 PROCEDURE — 36430 TRANSFUSION BLD/BLD COMPNT: CPT

## 2024-01-10 PROCEDURE — 86901 BLOOD TYPING SEROLOGIC RH(D): CPT

## 2024-01-10 PROCEDURE — 86923 COMPATIBILITY TEST ELECTRIC: CPT

## 2024-01-10 PROCEDURE — 80053 COMPREHEN METABOLIC PANEL: CPT | Performed by: EMERGENCY MEDICINE

## 2024-01-10 PROCEDURE — 99214 OFFICE O/P EST MOD 30 MIN: CPT | Performed by: NURSE PRACTITIONER

## 2024-01-10 RX ORDER — INSULIN LISPRO 100 [IU]/ML
1-5 INJECTION, SOLUTION INTRAVENOUS; SUBCUTANEOUS
Status: DISCONTINUED | OUTPATIENT
Start: 2024-01-10 | End: 2024-01-13 | Stop reason: HOSPADM

## 2024-01-10 RX ORDER — ACETAMINOPHEN 325 MG/1
650 TABLET ORAL EVERY 6 HOURS PRN
Status: DISCONTINUED | OUTPATIENT
Start: 2024-01-10 | End: 2024-01-13 | Stop reason: HOSPADM

## 2024-01-10 RX ORDER — ALLOPURINOL 300 MG/1
300 TABLET ORAL DAILY
Status: DISCONTINUED | OUTPATIENT
Start: 2024-01-11 | End: 2024-01-13 | Stop reason: HOSPADM

## 2024-01-10 RX ORDER — PANTOPRAZOLE SODIUM 40 MG/10ML
40 INJECTION, POWDER, LYOPHILIZED, FOR SOLUTION INTRAVENOUS EVERY 12 HOURS SCHEDULED
Status: DISCONTINUED | OUTPATIENT
Start: 2024-01-10 | End: 2024-01-13 | Stop reason: HOSPADM

## 2024-01-10 RX ORDER — AMLODIPINE BESYLATE 10 MG/1
10 TABLET ORAL DAILY
Status: DISCONTINUED | OUTPATIENT
Start: 2024-01-11 | End: 2024-01-13 | Stop reason: HOSPADM

## 2024-01-10 RX ORDER — ATORVASTATIN CALCIUM 40 MG/1
80 TABLET, FILM COATED ORAL
Status: DISCONTINUED | OUTPATIENT
Start: 2024-01-10 | End: 2024-01-13 | Stop reason: HOSPADM

## 2024-01-10 RX ORDER — METOPROLOL SUCCINATE 25 MG/1
12.5 TABLET, EXTENDED RELEASE ORAL DAILY
Status: DISCONTINUED | OUTPATIENT
Start: 2024-01-11 | End: 2024-01-13 | Stop reason: HOSPADM

## 2024-01-10 RX ORDER — CALCITRIOL 0.25 UG/1
0.25 CAPSULE, LIQUID FILLED ORAL DAILY
Status: DISCONTINUED | OUTPATIENT
Start: 2024-01-11 | End: 2024-01-13 | Stop reason: HOSPADM

## 2024-01-10 RX ORDER — INSULIN LISPRO 100 [IU]/ML
1-5 INJECTION, SOLUTION INTRAVENOUS; SUBCUTANEOUS EVERY 6 HOURS SCHEDULED
Status: DISCONTINUED | OUTPATIENT
Start: 2024-01-10 | End: 2024-01-13 | Stop reason: HOSPADM

## 2024-01-10 RX ORDER — HEPARIN SODIUM 5000 [USP'U]/ML
5000 INJECTION, SOLUTION INTRAVENOUS; SUBCUTANEOUS EVERY 8 HOURS SCHEDULED
Status: DISCONTINUED | OUTPATIENT
Start: 2024-01-10 | End: 2024-01-10

## 2024-01-10 RX ADMIN — SODIUM CHLORIDE 80 MG: 9 INJECTION, SOLUTION INTRAVENOUS at 16:38

## 2024-01-10 RX ADMIN — ATORVASTATIN CALCIUM 80 MG: 40 TABLET, FILM COATED ORAL at 20:34

## 2024-01-10 RX ADMIN — SODIUM CHLORIDE 1000 ML: 0.9 INJECTION, SOLUTION INTRAVENOUS at 16:28

## 2024-01-10 RX ADMIN — PANTOPRAZOLE SODIUM 40 MG: 40 INJECTION, POWDER, FOR SOLUTION INTRAVENOUS at 20:35

## 2024-01-10 NOTE — TELEPHONE ENCOUNTER
Patients GI provider:  ZHANNA Jauregui    Number to return call: [phone]     Reason for call: Pt calling to state that he is at ED as advised by Rafia. ED is stating the wait is 2hrs and that office should have entered pt in system for direct admit. Tried calling [facility] clinical and clerical, was unable to get anyone on the other line. Please call pt back to help get him setup as a direct admit.    Scheduled procedure/appointment date if applicable: N/A

## 2024-01-10 NOTE — ED PROVIDER NOTES
History  Chief Complaint   Patient presents with    GI Bleeding     Pt states he has had 3 GI bleeds since December 4th. +Plavix. Pt states he started having black/bloody stools starting last night. GI doctor concerned for another GI bleed. Also states he has LLQ pain.      Jay Roach Jr. is a 78 y.o. male with history of multiple recent hospitalizations for GI bleeds with repeated EGDs to treat bleeding ulcer, GERD, peptic ulcer disease, anemia presenting for concern for GI bleed.    Patient reports developing dark, maroon colored stools over the last 24 hours, last this morning. Initially also appeared to have bright red though this was on the toilet paper. Reports associated left-sided abdominal discomfort, as well as reflux symptoms including burping. No nausea, vomiting. No lightheadedness, dizziness, chest pain, palpitations, shortness of breath, new extremity numbness, weakness or pain. Has been able to eat, last had a small amount of yogurt this morning but has otherwise not had significant intake. Has been making normal urine without issue.    Patient otherwise denies fevers, chills, headaches.        Prior to Admission Medications   Prescriptions Last Dose Informant Patient Reported? Taking?   D3-50 1.25 MG (02555 UT) capsule  Self No No   Sig: TAKE 1 CAPSULE ONCE WEEKLY   Patient not taking: Reported on 12/28/2023   allopurinol (ZYLOPRIM) 300 mg tablet 1/10/2024 Self No Yes   Sig: TAKE 1 TABLET DAILY   amLODIPine (NORVASC) 10 mg tablet 1/10/2024 Self No Yes   Sig: TAKE 1 TABLET DAILY   aspirin (ECOTRIN LOW STRENGTH) 81 mg EC tablet 1/10/2024 Self Yes Yes   Sig: Take 81 mg by mouth daily   atorvastatin (LIPITOR) 80 mg tablet 1/9/2024 Self No Yes   Sig: TAKE 1 TABLET DAILY AT     BEDTIME   calcitriol (ROCALTROL) 0.25 mcg capsule 1/10/2024 Self No Yes   Sig: Take 1 capsule (0.25 mcg total) by mouth daily   clopidogrel (PLAVIX) 75 mg tablet 1/10/2024 Self No Yes   Sig: Take 1 tablet (75 mg total) by  mouth daily   glimepiride (AMARYL) 1 mg tablet 1/10/2024 Self No Yes   Sig: Take 1 tablet (1 mg total) by mouth daily with breakfast   metoprolol succinate (TOPROL-XL) 25 mg 24 hr tablet 1/10/2024 Self No Yes   Sig: Take 0.5 tablets (12.5 mg total) by mouth daily   pancrelipase, Lip-Prot-Amyl, (CREON) 24,000 units  Self No No   Sig: Take 24,000 units of lipase by mouth 3 (three) times a day with meals   pantoprazole (PROTONIX) 40 mg tablet 1/10/2024 Self No Yes   Sig: Take 1 tablet (40 mg total) by mouth 2 (two) times a day   torsemide (DEMADEX) 20 mg tablet 1/10/2024 Self No Yes   Sig: TAKE 0.5 TABLETS (10 MG TOTAL) BY MOUTH DAILY TAKES 10 MG ONCE A DAY.      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 (HCC) 2/24/2023    Basal cell carcinoma     right cheek    CAD (coronary artery disease)     Carotid stenosis, asymptomatic, bilateral     Chronic kidney disease     Colon polyp     Diabetes (HCC)     type 2, non-insulin dependent    Diabetes mellitus (HCC)     GERD (gastroesophageal reflux disease)     History of nephrolithiasis     Hyperlipidemia     Hypertension     Left foot pain 11/30/2022    PAD (peripheral artery disease) ([facility])     Severe aortic stenosis     Squamous cell skin cancer 11/22/2022    left superior helix       Past Surgical History:   Procedure Laterality Date    APPENDECTOMY      CARDIAC CATHETERIZATION N/A 05/05/2022    Procedure: CARDIAC RHC/LHC;  Surgeon: Arvin Sanchez DO;  [facility];  Service: Cardiology    CARDIAC CATHETERIZATION N/A 05/05/2022    Procedure: Cardiac Coronary Angiogram;  Surgeon: Arvin Sanchez DO;  [facility];  Service: Cardiology    CARDIAC CATHETERIZATION N/A 05/24/2022    Procedure:  Cardiac pci;  Surgeon: Damien Jeter MD;  [facility]: [facility];  Service: Cardiology    CARDIAC CATHETERIZATION N/A 06/14/2022    Procedure: CARDIAC TAVR;  Surgeon: Nahum Vaz MD;  Location: [facility];  Service: Cardiology    COLECTOMY      COLONOSCOPY  2013    CORONARY ARTERY BYPASS GRAFT  2013    X 2    FEMORAL ARTERY - POPLITEAL ARTERY BYPASS GRAFT      HEMORRHOID SURGERY      IR AORTAGRAM WITH RUN-OFF  11/19/2018    IR AORTAGRAM WITH RUN-OFF  3/2/2023    IR LOWER EXTREMITY ANGIOGRAM  3/23/2023    MOHS SURGERY Left 01/11/2023    SCC left superior helix-Dr Guy    AL SLCTV CATHJ 3RD+ ORD SLCTV ABDL PEL/LXTR BRNCH Left 08/12/2016    Procedure: LEFT FEMORAL ARTERIOGRAM; BALLOON ANGIOPLASTY; SFA  AND FEMORAL AT VEIN GRAFT;  Surgeon: Nicholas Urena MD;  Location: [facility];  Service: Vascular    AL TEAEC W/WO PATCH GRAFT COMMON FEMORAL Right 3/23/2023    Procedure: Common femoral endarterectomy&antegrade intervention of SFA, popliteal artery w/ shockwave;  Surgeon: Eagle Higuera MD;  Location[facility];  Service: Vascular    AL TRANSCATHETER TRANSAPICAL REPLACEMT AORTIC VALVE N/A 06/14/2022    Procedure: REPLACEMENT AORTIC VALVE TRANSCATHETER (TAVR) TRANSAPICAL 29MM IRVIN KYLER S3 ULTRA VALVE(ACCESS ON LEFT) ELANA;  Surgeon: Amarjit Gordon DO;  Location: [facility];  Service: Cardiac Surgery    SKIN CANCER EXCISION      TONSILLECTOMY AND ADENOIDECTOMY         Family History   Problem Relation Age of Onset    Heart attack Father     Other Sister         bypass and vlave replacement    Stroke Paternal Uncle     Arrhythmia Neg Hx     Asthma Neg Hx     Clotting disorder Neg Hx     Fainting Neg Hx     Anuerysm Neg Hx     Hypertension Neg Hx         unsure     Hyperlipidemia Neg Hx     Heart failure Neg Hx      I have reviewed and agree with the history as documented.    E-Cigarette/Vaping    E-Cigarette Use Never User      E-Cigarette/Vaping Substances    Nicotine No     THC No     CBD No      Flavoring No     Other No     Unknown No      Social History     Tobacco Use    Smoking status: Every Day     Current packs/day: 0.25     Average packs/day: 0.3 packs/day for 50.0 years (12.5 ttl pk-yrs)     Types: Cigarettes     Passive exposure: Current    Smokeless tobacco: Never   Vaping Use    Vaping status: Never Used   Substance Use Topics    Alcohol use: Not Currently     Comment: rare    Drug use: No        Review of Systems   Constitutional:  Negative for chills and fever.   HENT:  Negative for congestion, rhinorrhea and sore throat.    Eyes:  Negative for pain and visual disturbance.   Respiratory:  Negative for cough, chest tightness, shortness of breath, wheezing and stridor.    Cardiovascular:  Negative for chest pain, palpitations and leg swelling.   Gastrointestinal:  Positive for abdominal pain (discomfort on left side) and blood in stool. Negative for abdominal distention, diarrhea, nausea and vomiting.   Genitourinary:  Negative for dysuria and hematuria.   Musculoskeletal:  Negative for arthralgias and back pain.   Skin:  Negative for color change, pallor and rash.   Neurological:  Negative for dizziness, syncope, light-headedness, numbness and headaches.   Psychiatric/Behavioral:  Negative for behavioral problems.    All other systems reviewed and are negative.      Physical Exam  ED Triage Vitals [01/10/24 1540]   Temperature Pulse Respirations Blood Pressure SpO2   (!) 97.1 °F (36.2 °C) 68 17 142/59 97 %      Temp Source Heart Rate Source Patient Position - Orthostatic VS BP Location FiO2 (%)   Oral Monitor Sitting Right arm --      Pain Score       --             Orthostatic Vital Signs  Vitals:    01/10/24 1540 01/10/24 1615 01/10/24 1700 01/10/24 1730   BP: 142/59 123/55 130/63 155/68   Pulse: 68 72 64 68   Patient Position - Orthostatic VS: Sitting          Physical Exam  Vitals and nursing note reviewed.   Constitutional:       General: He is not in acute distress.     Appearance: Normal  appearance. He is well-developed. He is not ill-appearing or toxic-appearing.   HENT:      Head: Normocephalic and atraumatic.      Nose: No rhinorrhea.      Mouth/Throat:      Mouth: Mucous membranes are moist.      Pharynx: Oropharynx is clear.   Eyes:      Extraocular Movements: Extraocular movements intact.      Conjunctiva/sclera: Conjunctivae normal.   Cardiovascular:      Rate and Rhythm: Normal rate and regular rhythm.      Pulses: Normal pulses.      Heart sounds: Normal heart sounds. No murmur heard.  Pulmonary:      Effort: Pulmonary effort is normal. No respiratory distress.      Breath sounds: Normal breath sounds.   Abdominal:      General: Abdomen is flat. Bowel sounds are normal.      Palpations: Abdomen is soft.      Tenderness: There is abdominal tenderness (Very mild tenderness in left quadrants). There is no right CVA tenderness, left CVA tenderness, guarding or rebound.   Genitourinary:     Rectum: Guaiac result positive.   Musculoskeletal:      Cervical back: Neck supple.      Right lower leg: No edema.      Left lower leg: No edema.   Skin:     General: Skin is warm and dry.      Capillary Refill: Capillary refill takes less than 2 seconds.   Neurological:      General: No focal deficit present.      Mental Status: He is alert and oriented to person, place, and time.   Psychiatric:         Mood and Affect: Mood normal.         Behavior: Behavior normal.         ED Medications  Medications   allopurinol (ZYLOPRIM) tablet 300 mg (has no administration in time range)   amLODIPine (NORVASC) tablet 10 mg (has no administration in time range)   atorvastatin (LIPITOR) tablet 80 mg (80 mg Oral Not Given 1/10/24 2202)   calcitriol (ROCALTROL) capsule 0.25 mcg (has no administration in time range)   metoprolol succinate (TOPROL-XL) 24 hr tablet 12.5 mg (has no administration in time range)   pantoprazole (PROTONIX) injection 40 mg (40 mg Intravenous Given 1/10/24 2035)   acetaminophen (TYLENOL) tablet  650 mg (has no administration in time range)   nicotine (NICODERM CQ) 7 mg/24hr TD 24 hr patch 1 patch (has no administration in time range)   insulin lispro (HumaLOG) 100 units/mL subcutaneous injection 1-5 Units ( Subcutaneous Not Given 1/10/24 2040)   insulin lispro (HumaLOG) 100 units/mL subcutaneous injection 1-5 Units (has no administration in time range)   pantoprazole (PROTONIX) 80 mg in sodium chloride 0.9 % 100 mL IVPB (0 mg Intravenous Stopped 1/10/24 1654)   sodium chloride 0.9 % bolus 1,000 mL (0 mL Intravenous Stopped 1/10/24 1738)       Diagnostic Studies  Results Reviewed       Procedure Component Value Units Date/Time    Fingerstick Glucose (POCT) [[id number]]  (Normal) Collected: 01/10/24 2039    Lab Status: Final result Updated: 01/10/24 2041     POC Glucose 112 mg/dl     Fingerstick Glucose (POCT) [[id number]]  (Normal) Collected: 01/10/24 1629    Lab Status: Final result Updated: 01/10/24 1630     POC Glucose 134 mg/dl     POCT occult blood stool [[id number]]  (Abnormal) Collected: 01/10/24 1628    Lab Status: In process Specimen: Stool Updated: 01/10/24 1628     EXT Fecal Occult Blood Positive     Control Valid    Comprehensive metabolic panel [[id number]]  (Abnormal) Collected: 01/10/24 1548    Lab Status: Final result Specimen: Blood from Arm, Left Updated: 01/10/24 1610     Sodium 140 mmol/L      Potassium 4.1 mmol/L      Chloride 105 mmol/L      CO2 27 mmol/L      ANION GAP 8 mmol/L      BUN 55 mg/dL      Creatinine 2.97 mg/dL      Glucose 109 mg/dL      Calcium 9.1 mg/dL      AST 10 U/L      ALT 5 U/L      Alkaline Phosphatase 61 U/L      Total Protein 6.5 g/dL      Albumin 3.8 g/dL      Total Bilirubin 0.34 mg/dL      eGFR 18 ml/min/1.73sq m     Narrative:      [employer] guidelines for Chronic Kidney Disease (CKD):     Stage 1 with normal or high GFR (GFR > 90 mL/min/1.73 square meters)    Stage 2 Mild CKD (GFR = 60-89 mL/min/1.73 square meters)    Stage 3A Moderate  CKD (GFR = 45-59 mL/min/1.73 square meters)    Stage 3B Moderate CKD (GFR = 30-44 mL/min/1.73 square meters)    Stage 4 Severe CKD (GFR = 15-29 mL/min/1.73 square meters)    Stage 5 End Stage CKD (GFR <15 mL/min/1.73 square meters)  Note: GFR calculation is accurate only with a steady state creatinine    Lipase [[id number]]  (Abnormal) Collected: 01/10/24 1548    Lab Status: Final result Specimen: Blood from Arm, Left Updated: 01/10/24 1610     Lipase <6 u/L     CBC and differential [[id number]]  (Abnormal) Collected: 01/10/24 1548    Lab Status: Final result Specimen: Blood from Arm, Left Updated: 01/10/24 1556     WBC 6.05 Thousand/uL      RBC 2.77 Million/uL      Hemoglobin 8.9 g/dL      Hematocrit 27.3 %      MCV 99 fL      MCH 32.1 pg      MCHC 32.6 g/dL      RDW 15.7 %      MPV 9.5 fL      Platelets 179 Thousands/uL      nRBC 0 /100 WBCs      Neutrophils Relative 70 %      Immat GRANS % 1 %      Lymphocytes Relative 19 %      Monocytes Relative 7 %      Eosinophils Relative 3 %      Basophils Relative 0 %      Neutrophils Absolute 4.26 Thousands/µL      Immature Grans Absolute 0.04 Thousand/uL      Lymphocytes Absolute 1.15 Thousands/µL      Monocytes Absolute 0.43 Thousand/µL      Eosinophils Absolute 0.15 Thousand/µL      Basophils Absolute 0.02 Thousands/µL                    No orders to display         Procedures  Procedures      ED Course  ED Course as of 01/10/24 2229   Wed Ha 10, 2024   1610 Type and screen ordered   1612 Blood Pressure: 142/59  142/59, HR 68, RR 17, 97.1 F, 97 % RA   1628 EXT Fecal Occult Blood (Ref: Negative)(!): Positive   1628 Lipase(!): <6   1628 First nurse CMP, CBC, Lipase, type and screen   1631 Hemoglobin(!): 8.9  10 two days ago.   1730 ABO Grouping: A   1730 Rh Factor: Positive   2100 POC Glucose: 112                                       Medical Decision Making  Jay Roach Jr. is a 78 y.o. male presenting for GI bleed. He has a history of recent admissions for GI  bleed, 3 since December requiring hospitalization. Mild left sided abdominal discomfort but no peritoneal signs on exam, rectal exam shows no active bleeding, no external hemorrhoids. He is hemodynamically stable here in the ED, otherwise feeling well.     Differential diagnosis including but not limited to: upper GI bleed, esophageal varices, gastritis, PUD, diverticulosis, AVM, hemorrhoids, anemia, coagulopathy, liver disease, tumor, anal fissure.    CBC notable for decrease in Hgb over the last 2 days, from 10 to 8.9 today, otherwise grossly normal. CMP shows Cr increase from last (2.97 from 2.61 two days ago) concerning for SHOAIB, otherwise grossly normal. Lipase normal.  FOBT positive for occult blood.  Type and screen sent, A+ blood type.    Likely recurrence of GI bleed given FOBT positive and decrease in Hgb over the last few days. Patient would benefit from admission.    Patient remained hemodynamically stable in the ED. Discussed all results with patient including lab work and evaluation. All questions answered. After evaluation and workup in the emergency department, discussed patient's case with Dr. Hill (NICOLLE) regarding admission who accepted the patient for further evaluation and management.    Amount and/or Complexity of Data Reviewed  Labs: ordered. Decision-making details documented in ED Course.    Risk  Decision regarding hospitalization.          Disposition  Final diagnoses:   GI bleeding     Time reflects when diagnosis was documented in both MDM as applicable and the Disposition within this note       Time User Action Codes Description Comment    1/10/2024  4:51 PM Patricia León Add [K92.2] GI bleeding           ED Disposition       ED Disposition   Admit    Condition   Stable    Date/Time   Wed Ha 10, 2024  4:51 PM    Comment   Case was discussed with NICOLLE and the patient's admission status was agreed to be Admission Status: inpatient status to the service of Dr. Hill.                Follow-up Information    None         Patient's Medications   Discharge Prescriptions    No medications on file     No discharge procedures on file.    PDMP Review         Value Time User    PDMP Reviewed  Yes 6/17/2022 12:05 PM Brandy Faust PA-C             ED Provider  Attending physically available and evaluated Jay Roach Jr.. I managed the patient along with the ED Attending.    Electronically Signed by           Patricia León MD  01/10/24 0123

## 2024-01-10 NOTE — TELEPHONE ENCOUNTER
TT Rafia CHAO who confirmed patient to be assessed by the ED if he is to be admitted or not.     I called and spoke with the patient and relayed this message. Pt understandable and will wait at ED for evaluation.

## 2024-01-10 NOTE — PROGRESS NOTES
[facility] - Outpatient Follow-up Note  Jay Roach Jr. 78 y.o. male MRN: [id number]  Encounter: [id number]          ASSESSMENT AND PLAN:      1. Gastroesophageal reflux disease without esophagitis  2. Peptic ulcer  3. Acute blood loss anemia  4. Gastrointestinal hemorrhage, unspecified gastrointestinal hemorrhage type  Patient has history of GERD, peptic ulcer, acute blood loss anemia and is exhibiting signs of current GI bleed.  Patient was recently in the hospital the beginning of December and end of December with GI bleed.  Patient presented to office for follow-up.  On follow-up patient reported that starting yesterday he had dark reddish-black stool associated with left upper quadrant abdominal pain and acid reflux.  Patient reports that his symptoms are similar to when he had a GI bleed in December.  Patient is on Plavix.  Colonoscopy done 12/8 showed healthy end-to-side ileocolonic anastomosis and cecum.  Scattered diverticulosis of moderate severity in descending colon and sigmoid colon.  Small hemorrhoids.  EGD 12/5 showed single ulcer in the fundus of the stomach with flat pigmented spot tissue ablated with heater probe; 4 clips successfully placed.  Mild patchy erythematous mucosa with erosion in the prepyloric region.  Repeat EGD done 12/23 showed fresh blood and blood clotting in the body of the stomach and greater curve of the stomach.  Single ulcer in the greater curve of the stomach placed 4 clips successfully injected 3 mL of epinephrine to address bleeding fresh oozing and clot at site of previous visualized gastric ulcer.  Hemoglobin was trending up.  Last hemoglobin 10.0 done 1/8. This was prior to patient having symptoms of recurrence of GI bleed.  Recommendations were that if patient rebleeds he will need angiography.  Patient was taken pantoprazole 40 mg 2 times a day as outpatient as recommended after previous EGD.    - Transfer to other facility; Patient  informed to go to [facility] Emergency Department for further evaluation of signs of active GI bleed.  Patient was reluctant to go to hospital but explained to patient that if he is actively bleeding he could bleed to death.  Canceled patient's vascular appointment for this afternoon.  Patient states he needs to stop at home prior to going to the emergency room for further evaluation.    5. Other chronic pancreatitis (HCC)  CT abdomen and pelvis done 12/22 showed sequence of chronic pancreatitis.  At 2 cm hypodense structure in the head of the pancreas is indeterminant but in retrospect is stable since at least 2021. This may reflect a hematoma or thrombosed pseudoaneurysm as the sequela of previous pancreatitis. Hyperdense cystic lesion is alternative possibility. Greater than 2-year stability favors benignity. A 6 to 12-month follow-up, preferably with MRI if performed, may be consider.  -Patient placed on recall list for MRI 12/24 for further evaluation of pancreatic lesion and chronic pancreatitis.    6. Thrombocytopenia, unspecified (HCC)  Patient has history of thrombocytopenia during recent hospitalization when he was having an active GI bleed.  Recent lab work on 12/28 and 1/8/2024 shows that thrombocytopenia has resolved and platelet count is within normal limits.    7. Colon cancer screening  Up to date. Colonoscopy done 12/8 showed healthy end-to-side ileocolonic anastomosis and cecum.  Scattered diverticulosis of moderate severity in descending colon and sigmoid colon.  Small hemorrhoids.    ______________________________________________________________________    SUBJECTIVE: This is pleasant 78-year-old male who presents to office for follow-up after hospitalization for GI bleed stomach ulcer and acute blood loss anemia.Patient has history of GERD, peptic ulcer, acute blood loss anemia and is exhibiting signs of current GI bleed.  Patient was recently in the hospital the beginning of December and  end of December with GI bleed.  Patient presented to office for follow-up.  On follow-up patient reported that starting yesterday he had dark reddish-black stool associated with left upper quadrant abdominal pain and acid reflux.  Patient reports that his symptoms are similar to when he had a GI bleed in December.  Patient denies nausea, vomiting, or lower abdominal pain.  Abdomen exam positive for tenderness in left upper quadrant.  Patient is on Plavix which puts him at high risk for re-bleed.  Colonoscopy done 12/8 showed healthy end-to-side ileocolonic anastomosis and cecum.  Scattered diverticulosis of moderate severity in descending colon and sigmoid colon.  Small hemorrhoids.  EGD 12/5 showed single ulcer in the fundus of the stomach with flat pigmented spot tissue ablated with heater probe; 4 clips successfully placed.  Mild patchy erythematous mucosa with erosion in the prepyloric region.  Repeat EGD done 12/23 showed fresh blood and blood clotting in the body of the stomach and greater curve of the stomach.  Single ulcer in the greater curve of the stomach placed 4 clips successfully injected 3 mL of epinephrine to address bleeding fresh oozing and clot at site of previous visualized gastric ulcer.  Hemoglobin was trending up. Last hemoglobin 10.0 done 1/8. This was prior to patient having symptoms of recurrence of GI bleed. Recommendations were that if patient rebleeds he will need angiography. Patient was taken pantoprazole 40 mg 2 times a day as outpatient as recommended after previous EGD.      REVIEW OF SYSTEMS IS OTHERWISE NEGATIVE.      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) 2/24/2023    Basal cell carcinoma     right cheek    CAD (coronary artery disease)     Carotid stenosis,  asymptomatic, bilateral     Chronic kidney disease     Colon polyp     Diabetes (HCC)     type 2, non-insulin dependent    Diabetes mellitus (HCC)     GERD (gastroesophageal reflux disease)     History of nephrolithiasis     Hyperlipidemia     Hypertension     Left foot pain 11/30/2022    PAD (peripheral artery disease) (HCC)     Severe aortic stenosis     Squamous cell skin cancer 11/22/2022    left superior helix     Past Surgical History:   Procedure Laterality Date    APPENDECTOMY      CARDIAC CATHETERIZATION N/A 05/05/2022    Procedure: CARDIAC RHC/LHC;  Surgeon: Arvin Sanchez DO;  Location: [facility];  Service: Cardiology    CARDIAC CATHETERIZATION N/A 05/05/2022    Procedure: Cardiac Coronary Angiogram;  Surgeon: Arvin Sanchez DO;  Location[facility];  Service: Cardiology    CARDIAC CATHETERIZATION N/A 05/24/2022    Procedure: Cardiac pci;  Surgeon: Damien Jeter MD;  Location: [facility];  Service: Cardiology    CARDIAC CATHETERIZATION N/A 06/14/2022    Procedure: CARDIAC TAVR;  Surgeon: Nahum Vaz MD;  Location: [facility];  Service: Cardiology    COLECTOMY      COLONOSCOPY  2013    CORONARY ARTERY BYPASS GRAFT  2013    X 2    FEMORAL ARTERY - POPLITEAL ARTERY BYPASS GRAFT      HEMORRHOID SURGERY      IR AORTAGRAM WITH RUN-OFF  11/19/2018    IR AORTAGRAM WITH RUN-OFF  3/2/2023    IR LOWER EXTREMITY ANGIOGRAM  3/23/2023    MOHS SURGERY Left 01/11/2023    SCC left superior helix-Dr Guy    MA SLCTV CATHJ 3RD+ ORD SLCTV ABDL PEL/LXTR BRNCH Left 08/12/2016    Procedure: LEFT FEMORAL ARTERIOGRAM; BALLOON ANGIOPLASTY; SFA  AND FEMORAL AT VEIN GRAFT;  Surgeon: Nicholas Urena MD;  Location: [facility];  Service: Vascular    MA TEAEC W/WO PATCH GRAFT COMMON FEMORAL Right 3/23/2023    Procedure: Common femoral endarterectomy&antegrade intervention of SFA, popliteal artery w/ shockwave;  Surgeon: Eagle Higuera MD;  Location: [facility];  Service: Vascular     "HI TRANSCATHETER TRANSAPICAL REPLACEMT AORTIC VALVE N/A 06/14/2022    Procedure: REPLACEMENT AORTIC VALVE TRANSCATHETER (TAVR) TRANSAPICAL 29MM IRVIN KYLER S3 ULTRA VALVE(ACCESS ON LEFT) ELANA;  Surgeon: Amarjit Gordon DO;  Location: [facility];  Service: Cardiac Surgery    SKIN CANCER EXCISION      TONSILLECTOMY AND ADENOIDECTOMY       Social History   Social History     Substance and Sexual Activity   Alcohol Use Not Currently    Comment: rare     Social History     Substance and Sexual Activity   Drug Use No     Social History     Tobacco Use   Smoking Status Every Day    Current packs/day: 0.25    Average packs/day: 0.3 packs/day for 50.0 years (12.5 ttl pk-yrs)    Types: Cigarettes    Passive exposure: Current   Smokeless Tobacco Never     Family History   Problem Relation Age of Onset    Heart attack Father     Other Sister         bypass and vlave replacement    Stroke Paternal Uncle     Arrhythmia Neg Hx     Asthma Neg Hx     Clotting disorder Neg Hx     Fainting Neg Hx     Anuerysm Neg Hx     Hypertension Neg Hx         unsure     Hyperlipidemia Neg Hx     Heart failure Neg Hx        Meds/Allergies       Current Outpatient Medications:     allopurinol (ZYLOPRIM) 300 mg tablet    amLODIPine (NORVASC) 10 mg tablet    aspirin (ECOTRIN LOW STRENGTH) 81 mg EC tablet    atorvastatin (LIPITOR) 80 mg tablet    calcitriol (ROCALTROL) 0.25 mcg capsule    clopidogrel (PLAVIX) 75 mg tablet    glimepiride (AMARYL) 1 mg tablet    metoprolol succinate (TOPROL-XL) 25 mg 24 hr tablet    pancrelipase, Lip-Prot-Amyl, (CREON) 24,000 units    pantoprazole (PROTONIX) 40 mg tablet    torsemide (DEMADEX) 20 mg tablet    D3-50 1.25 MG (56172 UT) capsule    No Known Allergies        Objective     Blood pressure (!) 126/42, pulse 75, height 5' 10\" (1.778 m), weight 76.5 kg (168 lb 9.6 oz). Body mass index is 24.19 kg/m².      PHYSICAL EXAM:      General Appearance:   Alert, cooperative, no distress   HEENT:   Normocephalic, " atraumatic, anicteric.     Neck:  Supple, symmetrical, trachea midline   Lungs:   Clear to auscultation bilaterally; no rales, rhonchi or wheezing; respirations unlabored    Heart::   Regular rate and rhythm; no murmur, rub, or gallop.   Abdomen:   Soft, tender in LUQ with palpation, non-distended; normal bowel sounds; no masses, no organomegaly    Genitalia:   Deferred    Rectal:   Deferred    Extremities:  No cyanosis, clubbing or edema    Pulses:  2+ and symmetric    Skin:  No jaundice, rashes, or lesions    Lymph nodes:  No palpable cervical lymphadenopathy        Lab Results:   No visits with results within 1 Day(s) from this visit.   Latest known visit with results is:   Appointment on 01/08/2024   Component Date Value    Sodium 01/08/2024 144     Potassium 01/08/2024 4.1     Chloride 01/08/2024 104     CO2 01/08/2024 28     ANION GAP 01/08/2024 12     BUN 01/08/2024 47 (H)     Creatinine 01/08/2024 2.61 (H)     Glucose, Fasting 01/08/2024 150 (H)     Calcium 01/08/2024 9.2     eGFR 01/08/2024 22     Creatinine, Ur 01/08/2024 92.5     Protein Urine Random 01/08/2024 28     Prot/Creat Ratio, Ur 01/08/2024 0.30 (H)     PTH 01/08/2024 111.3 (H)     Color, UA 01/08/2024 Light Yellow     Clarity, UA 01/08/2024 Clear     Specific Gravity, UA 01/08/2024 1.013     pH, UA 01/08/2024 5.5     Leukocytes, UA 01/08/2024 Negative     Nitrite, UA 01/08/2024 Negative     Protein, UA 01/08/2024 Trace (A)     Glucose, UA 01/08/2024 Negative     Ketones, UA 01/08/2024 Negative     Urobilinogen, UA 01/08/2024 <2.0     Bilirubin, UA 01/08/2024 Negative     Occult Blood, UA 01/08/2024 Negative     RBC, UA 01/08/2024 None Seen     WBC, UA 01/08/2024 None Seen     Epithelial Cells 01/08/2024 None Seen     Bacteria, UA 01/08/2024 None Seen     Vit D, 25-Hydroxy 01/08/2024 64.0     Phosphorus 01/08/2024 4.0     Magnesium 01/08/2024 2.2     WBC 01/08/2024 6.93     RBC 01/08/2024 3.24 (L)     Hemoglobin 01/08/2024 10.0 (L)     Hematocrit  01/08/2024 32.2 (L)     MCV 01/08/2024 99 (H)     MCH 01/08/2024 30.9     MCHC 01/08/2024 31.1 (L)     RDW 01/08/2024 15.6 (H)     Platelets 01/08/2024 207     MPV 01/08/2024 10.9     Creatinine, Ur 01/08/2024 93.8     Albumin,U,Random 01/08/2024 87.4 (H)     Albumin Creat Ratio 01/08/2024 93 (H)     Uric Acid 01/08/2024 4.9     Iron Saturation 01/08/2024 12 (L)     TIBC 01/08/2024 326     Iron 01/08/2024 40 (L)     UIBC 01/08/2024 286     Ferritin 01/08/2024 23 (L)          Radiology Results:   EGD    Result Date: 12/23/2023  Narrative: Table formatting from the original result was not included. Formerly [facility], later [facility] Operating Room [address] [address] [phone] DATE OF SERVICE: 12/23/23 PHYSICIAN(S): Attending: Amarjit Summers MD Fellow: No Staff Documented INDICATION: Gastrointestinal hemorrhage, unspecified gastrointestinal hemorrhage type POST-OP DIAGNOSIS: See the impression below. PREPROCEDURE: Informed consent was obtained for the procedure, including sedation.  Risks of perforation, hemorrhage, adverse drug reaction and aspiration were discussed. The patient was placed in the left lateral decubitus position. Patient was explained about the risks and benefits of the procedure. Risks including but not limited to bleeding, infection, and perforation were explained in detail. Also explained about less than 100% sensitivity with the exam and other alternatives. PROCEDURE: EGD DETAILS OF PROCEDURE: Patient was taken to the procedure room where a time out was performed to confirm correct patient and correct procedure. The patient underwent monitored anesthesia care, which was administered by an anesthesia professional. The patient's blood pressure, heart rate, level of consciousness, respirations and oxygen were monitored throughout the procedure. The scope was advanced to the second part of the duodenum. Retroflexion was performed in the fundus. The patient's estimated blood loss  was minimal (<5 mL). The procedure was not difficult. The patient tolerated the procedure well. There were no apparent adverse events. ANESTHESIA INFORMATION: ASA: IV Anesthesia Type: IV Sedation with Anesthesia MEDICATIONS: EPINEPHrine PF (ADRENALIN) 1 mg/mL injection 1 mg (Totals for administrations occurring from 1349 to 1428 on 12/23/23) FINDINGS: Fresh blood and blood clotting in the body of the stomach and greater curve of the stomach Single ulcer in the greater curve of the stomach; placed 4 clips successfully (clips are MRI conditional); injected 3 mL of epinephrine to address bleeding. Fresh oozing and clot at the site of previously visualized gastric ulcer.  Multiple clips were in place from prior endoscopy.  The area was injected with 1-10,000 epinephrine and the clot removed with suction.  There was no active bleeding after injection.  2 jumbo resolution clips and 2 standard resolution clips were placed SPECIMENS: * No specimens in log *     Impression: Fresh blood and blood clotting in the body of the stomach and greater curve of the stomach Single ulcer in the greater curve of the stomach; placed 4 clips successfully; injected epinephrine to address bleeding RECOMMENDATION:  Maintain large-bore IV access.  Follow hemoglobin and vitals Transfuse to maintain hemoglobin greater than 7 Continue pantoprazole drip If remains stable may restart antiplatelet agents tomorrow. If evidence of rebleeding, will likely need angiography    Amarjit Summers MD     CT abdomen pelvis wo contrast    Result Date: 12/22/2023  Narrative: CT ABDOMEN AND PELVIS WITHOUT IV CONTRAST INDICATION:   Abdominal pain, acute, nonlocalized recurrence of bloody stool, discomfort throughout abdomen. COMPARISON: CT abdomen pelvis 12/7/2021, CTA chest abdomen pelvis 4/15/2022. TECHNIQUE:  CT examination of the abdomen and pelvis was performed without intravenous contrast. Multiplanar 2D reformatted images were created from the source data.  This examination, like all CT scans performed in the [facility], was performed utilizing techniques to minimize radiation dose exposure, including the use of iterative reconstruction and automated exposure control. Radiation dose length product (DLP) for this visit:  620.36 mGy-cm Enteric contrast was not administered. FINDINGS: ABDOMEN LOWER CHEST: Stable interstitial changes at the lung bases. Coronary artery and aortic calcifications. LIVER/BILIARY TREE:  Unremarkable. GALLBLADDER: There are gallstone(s) within the gallbladder, without pericholecystic inflammatory changes. SPLEEN:  Unremarkable. PANCREAS: Atrophic with diffuse calcifications consistent sequela of chronic pancreatitis. In the head of the pancreas there is a 2 cm ovoid hyperdense structure (series 201 image 54). In retrospect this finding is stable since at least 12/7/2021. It did  not fill with contrast on the prior CTA. ADRENAL GLANDS:  Unremarkable. KIDNEYS/URETERS: Multiple bilateral nonobstructing calculi, largest 8 mm in the left lower pole. Hypodensity in the right upper pole is likely a cyst. No hydronephrosis. STOMACH AND BOWEL: No dilated loops of bowel. Colonic diverticulosis without evidence of acute diverticulitis. There is mild thickening throughout the left and sigmoid colon. Evidence of prior partial colonic resection with enterocolonic anastomosis in the right upper quadrant. APPENDIX: Surgically absent. ABDOMINOPELVIC CAVITY:  No ascites.  No pneumoperitoneum.  No lymphadenopathy. VESSELS: Atherosclerotic changes are present.  No evidence of aneurysm. PELVIS REPRODUCTIVE ORGANS: The prostate is enlarged. URINARY BLADDER: Circumferential bladder wall thickening. ABDOMINAL WALL/INGUINAL REGIONS:  Unremarkable. OSSEOUS STRUCTURES:  No acute fracture or destructive osseous lesion. Stable sclerotic focus in the left sacrum. Degenerative changes in the spine.     Impression: 1.  Mild wall thickening of the left and  sigmoid colon may be due to colitis. 2.  Circumferential bladder wall thickening. Correlate for evidence of cystitis. 3.  Cholelithiasis without evidence of acute cholecystitis. 4.  Multiple bilateral nonobstructing renal calculi. 5.  Sequela of chronic pancreatitis. A 2 cm hyperdense structure in the head of the pancreas is indeterminate but in retrospect is stable since at least 2021. This may reflect a hematoma or thrombosed pseudoaneurysm as the sequela of previous pancreatitis. Hyperdense cystic lesion is alternative possibility. Greater than 2-year stability favors benignity. A 6 to 12-month follow-up, preferably with MRI if performed, may be considered depending on comorbidities. The study was marked in EPIC for immediate notification. Workstation performed: [id number]     VAS carotid complete study    Result Date: 12/21/2023  Narrative:  [facility] REPORT CLINICAL: Indications: 6 month surveillance of carotid artery disease.  Patient is asymptomatic at this time. Operative History: 2023-03-23 Right right femoral endarterectomy, right SFA, pop and AT angioplasty 2018-11-19 Right anterior tibial artery angioplasty 2018-11-19 Right popliteal artery angioplasty 2018-11-19 Right superficial femoral artery angioplasty 2016-08-12 LEFT FEMORAL ARTERIOGRAM; BALLOON ANGIOPLASTY; SFA  AND FEMORAL AT VEIN GRAFT 2015-11-19 Left Femoro_anterior tibial bypass Greater saphenous CABG x 2 with lt gsv harvest Right Hemicolectomy Left CFA endarterectomy Risk Factors The patient has history of HTN, Hyperlipidemia, PAD, CAD and smoking (current) 0.25 ppd.  He has no history of Diabetes. Clinical Right Pressure:  156/ mm Hg, Left Pressure:  160/ mm Hg.  FINDINGS:  Right        Impression  PSV  EDV (cm/s)  Direction of Flow  Ratio  Dist. ICA                176          23                      1.92  Mid. ICA     70%+        231          49                      2.52  Prox. ICA    70%+        431          60                       4.69  Dist CCA                  86          14                            Mid CCA                   92           0                      0.89  Prox CCA                 103           0                            Ext Carotid              460          26                      5.00  Prox Vert                 35          12  Antegrade                 Subclavian               169           0                             Left         Impression  PSV  EDV (cm/s)  Direction of Flow  Ratio  Dist. ICA                145          29                      1.30  Mid. ICA                 145          32                      1.30  Prox. ICA    70%+        425          77                      3.80  Dist CCA                 125          17                            Mid CCA                  112          13                      0.81  Prox CCA                 138          15                            Ext Carotid              511          30                      4.58  Prox Vert                 96          26  Antegrade                 Subclavian               195           9                               CONCLUSION: Impression RIGHT: There is >70% stenosis in the internal carotid artery. Plaque is heterogenous and irregular. There is a severe stenosis in the external carotid artery. Vertebral artery flow is antegrade. There is no significant subclavian artery disease.  LEFT: There is >70% stenosis in the internal carotid artery. Plaque is heterogenous and irregular. There is a severe stenosis in the external carotid artery. Vertebral artery flow is antegrade.  Compared to previous study on 6/6/2023, there is no significant change. Recommend repeat testing in 6 month as per protocol unless otherwise indicated.  SIGNATURE: Electronically Signed by: CYN BERRIOS MD on 2023-12-21 07:03:08 PM    VAS lower limb arterial duplex, complete bilateral    Result Date: 12/21/2023  Narrative:  [facility] REPORT CLINICAL: Indications:   Atherosclerosis of native arteries of right leg with ulceration of heel and midfoot [I70.234].  6 month surveillance for progression of disease.  The patient reports no new symptoms. Operative History: 2023-03-23 Right right femoral endarterectomy, right SFA, pop and AT angioplasty 2018-11-19 Right anterior tibial artery angioplasty 2018-11-19 Right popliteal artery angioplasty 2018-11-19 Right superficial femoral artery angioplasty 2016-08-12 LEFT FEMORAL ARTERIOGRAM; BALLOON ANGIOPLASTY; SFA  AND FEMORAL AT VEIN GRAFT 2015-11-19 Left Femoro_anterior tibial bypass Greater saphenous CABG x 2 with lt gsv harvest Right Hemicolectomy Left CFA endarterectomy Risk Factors The patient has history of HTN, Hyperlipidemia, PAD, CAD and smoking (current) 0.25 ppd.  He has no history of Diabetes. Clinical Right Pressure:  156/ mm Hg, Left Pressure:  160/ mm Hg.  FINDINGS:  Segment                Right                        Left                                          Impression  PSV (cm/s)  EDV  Impression  PSV (cm/s)  EDV  Inflow Anastomosis                                                      51    0  Mid Thigh (Graft)                                                       80    5  Low Thigh (Graft)                                                      101    8  Near Knee (Graft)                                                      120    0  Mid Calf (Graft)                                                       132   10  High Calf (Graft)                                   >75%               529    0  Outflow Anastomosis                                                    132    0  Common Femoral Artery                      78    0                     153   13  Prox Profunda                             106   11                      70   13  Prox SFA               >75%               235   18                      72    0  Mid SFA                >75%               548   46                      80    0  Dist SFA               >75%                445   29                               Proximal Pop           50-75%             288   37                               Distal Pop                                 38    9                               Prox Post Tibial       Occluded            14    0                               Dist Post Tibial       Occluded            11    8                               Prox. Ant. Tibial                          23    4                      78    8  Dist. Ant. Tibial                          30    9                      77    4     CONCLUSION: Impression: RIGHT LOWER LIMB: This resting evaluation shows diffuse atherosclerotic disease in the femoral-popliteal segment with a 50-75% stenosis in the proximal profunda and >75% stenosis in the proximal, mid and distal superficial femoral arteries. There is a 50-75% stenosis in the proximal popliteal artery. The posterior tibial artery is occluded. Ankle/Brachial index: not obtained due to flat lined arteries Prior 0.60. PVR/ PPG tracings are dampened. Metatarsal and Great toe pressures cannot be obtained due to lack of perfusion  LEFT LOWER LIMB: The common femoral artery is ectatic in caliber, 1.6 cm. Previously 1.8 cm. There is a <50% stenosis in the common femoral artery, >75% stenosis in the proximal profunda femoral artery and a <50% stenosis in the mid superficial femoral artery. The superficial femoral- anterior tibial artery bypass graft is patent with a >75% stenosis in the distal portion of the graft. Ankle/Brachial index: 0.58, severe claudication range. Prior 0.70. PVR/ PPG tracings are dampened. Metatarsal pressure 58 of mmHg Great toe pressure of 59 mmHg, within the healing range  Compared to previous study on 6/6/2023, there is a decrease of th bilateral ABIs Recommend repeat testing in 6 months as per protocol unless otherwise indicated.  SIGNATURE: Electronically Signed by: CYN BERRIOS MD on 2023-12-21 07:02:17 PM

## 2024-01-11 ENCOUNTER — ANESTHESIA (OUTPATIENT)
Dept: GASTROENTEROLOGY | Facility: HOSPITAL | Age: 79
DRG: 378 | End: 2024-01-11
Payer: MEDICARE

## 2024-01-11 ENCOUNTER — APPOINTMENT (OUTPATIENT)
Dept: GASTROENTEROLOGY | Facility: HOSPITAL | Age: 79
DRG: 378 | End: 2024-01-11
Payer: MEDICARE

## 2024-01-11 ENCOUNTER — ANESTHESIA EVENT (OUTPATIENT)
Dept: GASTROENTEROLOGY | Facility: HOSPITAL | Age: 79
DRG: 378 | End: 2024-01-11
Payer: MEDICARE

## 2024-01-11 LAB
ANION GAP SERPL CALCULATED.3IONS-SCNC: 4 MMOL/L
BUN SERPL-MCNC: 50 MG/DL (ref 5–25)
CALCIUM SERPL-MCNC: 8.5 MG/DL (ref 8.4–10.2)
CHLORIDE SERPL-SCNC: 109 MMOL/L (ref 96–108)
CO2 SERPL-SCNC: 28 MMOL/L (ref 21–32)
CREAT SERPL-MCNC: 2.67 MG/DL (ref 0.6–1.3)
ERYTHROCYTE [DISTWIDTH] IN BLOOD BY AUTOMATED COUNT: 15.6 % (ref 11.6–15.1)
GFR SERPL CREATININE-BSD FRML MDRD: 21 ML/MIN/1.73SQ M
GLUCOSE SERPL-MCNC: 114 MG/DL (ref 65–140)
GLUCOSE SERPL-MCNC: 116 MG/DL (ref 65–140)
GLUCOSE SERPL-MCNC: 117 MG/DL (ref 65–140)
GLUCOSE SERPL-MCNC: 121 MG/DL (ref 65–140)
GLUCOSE SERPL-MCNC: 125 MG/DL (ref 65–140)
GLUCOSE SERPL-MCNC: 133 MG/DL (ref 65–140)
HCT VFR BLD AUTO: 21.5 % (ref 36.5–49.3)
HCT VFR BLD AUTO: 21.6 % (ref 36.5–49.3)
HGB BLD-MCNC: 6.7 G/DL (ref 12–17)
HGB BLD-MCNC: 6.8 G/DL (ref 12–17)
HGB BLD-MCNC: 8.2 G/DL (ref 12–17)
MAGNESIUM SERPL-MCNC: 1.9 MG/DL (ref 1.9–2.7)
MCH RBC QN AUTO: 31.5 PG (ref 26.8–34.3)
MCHC RBC AUTO-ENTMCNC: 31.6 G/DL (ref 31.4–37.4)
MCV RBC AUTO: 100 FL (ref 82–98)
PLATELET # BLD AUTO: 151 THOUSANDS/UL (ref 149–390)
PMV BLD AUTO: 10.2 FL (ref 8.9–12.7)
POTASSIUM SERPL-SCNC: 4 MMOL/L (ref 3.5–5.3)
RBC # BLD AUTO: 2.13 MILLION/UL (ref 3.88–5.62)
SODIUM SERPL-SCNC: 141 MMOL/L (ref 135–147)
WBC # BLD AUTO: 4.69 THOUSAND/UL (ref 4.31–10.16)

## 2024-01-11 PROCEDURE — C9113 INJ PANTOPRAZOLE SODIUM, VIA: HCPCS

## 2024-01-11 PROCEDURE — 85018 HEMOGLOBIN: CPT

## 2024-01-11 PROCEDURE — 80048 BASIC METABOLIC PNL TOTAL CA: CPT

## 2024-01-11 PROCEDURE — 83735 ASSAY OF MAGNESIUM: CPT

## 2024-01-11 PROCEDURE — P9016 RBC LEUKOCYTES REDUCED: HCPCS

## 2024-01-11 PROCEDURE — 82948 REAGENT STRIP/BLOOD GLUCOSE: CPT

## 2024-01-11 PROCEDURE — 85014 HEMATOCRIT: CPT

## 2024-01-11 PROCEDURE — 30233N1 TRANSFUSION OF NONAUTOLOGOUS RED BLOOD CELLS INTO PERIPHERAL VEIN, PERCUTANEOUS APPROACH: ICD-10-PCS | Performed by: HOSPITALIST

## 2024-01-11 PROCEDURE — 0W3P8ZZ CONTROL BLEEDING IN GASTROINTESTINAL TRACT, VIA NATURAL OR ARTIFICIAL OPENING ENDOSCOPIC: ICD-10-PCS | Performed by: INTERNAL MEDICINE

## 2024-01-11 PROCEDURE — 43270 EGD LESION ABLATION: CPT | Performed by: INTERNAL MEDICINE

## 2024-01-11 PROCEDURE — 85027 COMPLETE CBC AUTOMATED: CPT

## 2024-01-11 PROCEDURE — 36415 COLL VENOUS BLD VENIPUNCTURE: CPT

## 2024-01-11 PROCEDURE — 99232 SBSQ HOSP IP/OBS MODERATE 35: CPT | Performed by: HOSPITALIST

## 2024-01-11 RX ORDER — LIDOCAINE HYDROCHLORIDE 10 MG/ML
INJECTION, SOLUTION EPIDURAL; INFILTRATION; INTRACAUDAL; PERINEURAL AS NEEDED
Status: DISCONTINUED | OUTPATIENT
Start: 2024-01-11 | End: 2024-01-11

## 2024-01-11 RX ORDER — SODIUM CHLORIDE, SODIUM LACTATE, POTASSIUM CHLORIDE, CALCIUM CHLORIDE 600; 310; 30; 20 MG/100ML; MG/100ML; MG/100ML; MG/100ML
50 INJECTION, SOLUTION INTRAVENOUS CONTINUOUS
Status: CANCELLED | OUTPATIENT
Start: 2024-01-11

## 2024-01-11 RX ORDER — SODIUM CHLORIDE, SODIUM LACTATE, POTASSIUM CHLORIDE, CALCIUM CHLORIDE 600; 310; 30; 20 MG/100ML; MG/100ML; MG/100ML; MG/100ML
INJECTION, SOLUTION INTRAVENOUS CONTINUOUS PRN
Status: DISCONTINUED | OUTPATIENT
Start: 2024-01-11 | End: 2024-01-11

## 2024-01-11 RX ORDER — MAGNESIUM SULFATE 1 G/100ML
1 INJECTION INTRAVENOUS ONCE
Status: COMPLETED | OUTPATIENT
Start: 2024-01-11 | End: 2024-01-11

## 2024-01-11 RX ORDER — METOCLOPRAMIDE HYDROCHLORIDE 5 MG/ML
INJECTION INTRAMUSCULAR; INTRAVENOUS AS NEEDED
Status: DISCONTINUED | OUTPATIENT
Start: 2024-01-11 | End: 2024-01-11

## 2024-01-11 RX ORDER — PROPOFOL 10 MG/ML
INJECTION, EMULSION INTRAVENOUS AS NEEDED
Status: DISCONTINUED | OUTPATIENT
Start: 2024-01-11 | End: 2024-01-11

## 2024-01-11 RX ADMIN — PANTOPRAZOLE SODIUM 40 MG: 40 INJECTION, POWDER, FOR SOLUTION INTRAVENOUS at 22:17

## 2024-01-11 RX ADMIN — METOCLOPRAMIDE 10 MG: 5 INJECTION, SOLUTION INTRAMUSCULAR; INTRAVENOUS at 13:45

## 2024-01-11 RX ADMIN — METOPROLOL SUCCINATE 12.5 MG: 25 TABLET, EXTENDED RELEASE ORAL at 08:36

## 2024-01-11 RX ADMIN — AMLODIPINE BESYLATE 10 MG: 10 TABLET ORAL at 08:35

## 2024-01-11 RX ADMIN — PROPOFOL 80 MG: 10 INJECTION, EMULSION INTRAVENOUS at 14:00

## 2024-01-11 RX ADMIN — ATORVASTATIN CALCIUM 80 MG: 40 TABLET, FILM COATED ORAL at 22:18

## 2024-01-11 RX ADMIN — MAGNESIUM SULFATE HEPTAHYDRATE 1 G: 1 INJECTION, SOLUTION INTRAVENOUS at 08:40

## 2024-01-11 RX ADMIN — SODIUM CHLORIDE, SODIUM LACTATE, POTASSIUM CHLORIDE, AND CALCIUM CHLORIDE: .6; .31; .03; .02 INJECTION, SOLUTION INTRAVENOUS at 13:24

## 2024-01-11 RX ADMIN — ALLOPURINOL 300 MG: 300 TABLET ORAL at 08:37

## 2024-01-11 RX ADMIN — PROPOFOL 20 MG: 10 INJECTION, EMULSION INTRAVENOUS at 14:03

## 2024-01-11 RX ADMIN — PROPOFOL 20 MG: 10 INJECTION, EMULSION INTRAVENOUS at 14:06

## 2024-01-11 RX ADMIN — LIDOCAINE HYDROCHLORIDE 50 MG: 10 INJECTION, SOLUTION EPIDURAL; INFILTRATION; INTRACAUDAL; PERINEURAL at 14:00

## 2024-01-11 RX ADMIN — CALCITRIOL 0.25 MCG: 0.25 CAPSULE, LIQUID FILLED ORAL at 08:37

## 2024-01-11 RX ADMIN — PANTOPRAZOLE SODIUM 40 MG: 40 INJECTION, POWDER, FOR SOLUTION INTRAVENOUS at 08:37

## 2024-01-11 NOTE — ASSESSMENT & PLAN NOTE
Lab Results   Component Value Date    EGFR 21 01/11/2024    EGFR 18 01/10/2024    EGFR 22 01/08/2024    CREATININE 2.67 (H) 01/11/2024    CREATININE 2.97 (H) 01/10/2024    CREATININE 2.61 (H) 01/08/2024     Baseline appears to be 2-2.5  Etiology: prerenal in the setting of dehydration/decreased PO intake vs GI bleed    Plan  Monitor off fluids   Hold torsemide  Monitor BMP  Monitor I/Os  Urinary retention protocol

## 2024-01-11 NOTE — ASSESSMENT & PLAN NOTE
Home regimen: amlodipine 10 mg daily, Toprol XL 12.5 mg daily  Lisinopril held during previous admission due to SHOAIB. Not resumed on discharge pending repeat labs as outpatient.    Plan  Continue amlodipine, Toprol XL

## 2024-01-11 NOTE — ASSESSMENT & PLAN NOTE
On admission, Hgb 8.9, down from 10 2 days ago  Patient presented with 1 day history of bloody streaks noted on toilet paper after BM. Denies light-headedness, dizziness, nausea, vomiting, shortness of breath, weakness.  PMH of melena/GI bleed, peptic ulcer  Given history of CAD, recommended threshold for Hgb > 8    Plan  Repeat hgb this evening

## 2024-01-11 NOTE — CONSULTS
Consultation - [facility]  Jay Roach  78 y.o. male MRN: [id number]  Unit/Bed#: ED-01 Encounter: [id number]        Inpatient consult to gastroenterology  Consult performed by: Andres Mackey PA-C  Consult ordered by: Tierra Tom MD          Reason for Consult / Principal Problem: GI bleed    ASSESSMENT and PLAN:    Principal Problem:    GI bleed  Active Problems:    Hypertension    CAD (coronary artery disease)    T2DM (type 2 diabetes mellitus) (HCC)    GERD (gastroesophageal reflux disease)    Hyperlipidemia    Chronic diastolic CHF (congestive heart failure) (HCC)    Acute kidney injury superimposed on stage 4 chronic kidney disease (HCC)    Chronic pancreatitis (HCC)    Anemia  History of bleeding gastric ulcer  Exocrine Pancreatic Insufficiency  - keep NPO  - Hold aspirin and plavix  - follow Hg and transfuse PRN  - Hg 8.9 to 6.7 to 6.8 and is now getting one unit PRBC's  - IV pantoprazole 40mg BID  - Plan EGD later today    -------------------------------------------------------------------------------------------------------------------    HPI: 79 y/o white male with PMH of melena/GI bleeding in December 2023, GERD, CKD (3-4), peptic ulcer, HTN, CAD s/p PCI, AS s/p TAVR on aspirin and Plavix (last dose per patient on Weds 1/10/24), HFpEF, T2DM, right hemicolectomy s/p perforated appendicitis , exocrine pancreatic insufficiency who presents for evaluation of 1 day history of bleeding per rectum noted on toilet paper after BM and mild LLQ discomfort. Describes color of blood as dark red-black. The bowel movements themselves are nonbloody and formed.     Patient was in the hospital December 4th, 2023 for hematochezia and had EGD and Colonoscopy.  EGD did reveal benign appearing linear ulcer with pigmented spot in stomach [place] IIC s/p cautery and clip.   Colonoscopy unrevealing.  Was then re-admitted 12/23/23 for melena.   EGD 12/23/23 with fresh clot in stomach and greater  curvature with epinephrine applied and 4 clips.      Vitals stable.  Hg 10.0 on 1/8/23 and then 8.9 on admission and 6.7 to 6.8 today.  MCV elevated at 100 and MCH normal at 31.5.  He is getting one unit of PRBC's.  Patient has CKD and kidney function essentially at baseline (BUN 50, Cr 2.67).  He is to be transfused.    No bleeding since yesterday.  Patient denies any abdominal pain, nausea/vomiting.      Endoscopic History:  EGD - 12/23/23 with fresh clot in stomach and greater curvature with epinephrine applied and 4 clips   - 12/5/23 linear ulcer [place] IIC with cautery and clip x 4  Colonoscopy - 12/8/24 with healthy appearing anastomosis and scattered diverticulosis    REVIEW OF SYSTEMS:    CONSTITUTIONAL: Mild distress, decreased appetitie.  Denies any fever, chills, or rigors.  No recent weight loss.  HEENT: No earache or tinnitus. Denies hearing loss or visual disturbances.  CARDIOVASCULAR: No chest pain or palpitations.   RESPIRATORY: Denies any cough, hemoptysis, shortness of breath or dyspnea on exertion.  GASTROINTESTINAL: As noted in the History of Present Illness.   GENITOURINARY: No problems with urination. Denies any hematuria or dysuria.  NEUROLOGIC: No dizziness or vertigo, denies headaches.   MUSCULOSKELETAL: Denies any muscle or joint pain.   SKIN: Denies skin rashes or itching.   ENDOCRINE: Denies excessive thirst. Denies intolerance to heat or cold.  PSYCHOSOCIAL: Denies depression or anxiety. Denies any recent memory loss.       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 ([facility]) 09/10/2021    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 (HCC) 2/24/2023    Basal cell carcinoma     right cheek    CAD (coronary artery disease)     Carotid stenosis, asymptomatic, bilateral     Chronic kidney disease     Colon polyp     Diabetes (HCC)     type 2,  non-insulin dependent    Diabetes mellitus (HCC)     GERD (gastroesophageal reflux disease)     History of nephrolithiasis     Hyperlipidemia     Hypertension     Left foot pain 11/30/2022    PAD (peripheral artery disease) (HCC)     Severe aortic stenosis     Squamous cell skin cancer 11/22/2022    left superior helix     Past Surgical History:   Procedure Laterality Date    APPENDECTOMY      CARDIAC CATHETERIZATION N/A 05/05/2022    Procedure: CARDIAC RHC/LHC;  Surgeon: Arvin Sanchez DO;  Location: [facility];  Service: Cardiology    CARDIAC CATHETERIZATION N/A 05/05/2022    Procedure: Cardiac Coronary Angiogram;  Surgeon: Arvin Sanchez DO;  Location: [facility];  Service: Cardiology    CARDIAC CATHETERIZATION N/A 05/24/2022    Procedure: Cardiac pci;  Surgeon: Damien Jeter MD;  Location: [facility];  Service: Cardiology    CARDIAC CATHETERIZATION N/A 06/14/2022    Procedure: CARDIAC TAVR;  Surgeon: Nahum Vaz MD;  Location: [facility];  Service: Cardiology    COLECTOMY      COLONOSCOPY  2013    CORONARY ARTERY BYPASS GRAFT  2013    X 2    FEMORAL ARTERY - POPLITEAL ARTERY BYPASS GRAFT      HEMORRHOID SURGERY      IR AORTAGRAM WITH RUN-OFF  11/19/2018    IR AORTAGRAM WITH RUN-OFF  3/2/2023    IR LOWER EXTREMITY ANGIOGRAM  3/23/2023    MOHS SURGERY Left 01/11/2023    SCC left superior helix-Dr Tovar    GA SLCTV CATHJ 3RD+ ORD SLCTV ABDL PEL/LXTR BRNCH Left 08/12/2016    Procedure: LEFT FEMORAL ARTERIOGRAM; BALLOON ANGIOPLASTY; SFA  AND FEMORAL AT VEIN GRAFT;  Surgeon: Nicholas Urena MD;  Location: [facility];  Service: Vascular    GA TEAEC W/WO PATCH GRAFT COMMON FEMORAL Right 3/23/2023    Procedure: Common femoral endarterectomy&antegrade intervention of SFA, popliteal artery w/ shockwave;  Surgeon: Eagle Higuera MD;  Location: [facility];  Service: Vascular    GA TRANSCATHETER TRANSAPICAL REPLACEMT AORTIC VALVE N/A 06/14/2022    Procedure: REPLACEMENT AORTIC  VALVE TRANSCATHETER (TAVR) TRANSAPICAL 29MM IRVIN KYLER S3 ULTRA VALVE(ACCESS ON LEFT) ELANA;  Surgeon: Amarjit Gordon DO;  Location: [facility];  Service: Cardiac Surgery    SKIN CANCER EXCISION      TONSILLECTOMY AND ADENOIDECTOMY       Social History   Social History     Substance and Sexual Activity   Alcohol Use Not Currently    Comment: rare     Social History     Substance and Sexual Activity   Drug Use No     Social History     Tobacco Use   Smoking Status Every Day    Current packs/day: 0.25    Average packs/day: 0.3 packs/day for 50.0 years (12.5 ttl pk-yrs)    Types: Cigarettes    Passive exposure: Current   Smokeless Tobacco Never     Family History   Problem Relation Age of Onset    Heart attack Father     Other Sister         bypass and vlave replacement    Stroke Paternal Uncle     Arrhythmia Neg Hx     Asthma Neg Hx     Clotting disorder Neg Hx     Fainting Neg Hx     Anuerysm Neg Hx     Hypertension Neg Hx         unsure     Hyperlipidemia Neg Hx     Heart failure Neg Hx        Meds/Allergies     (Not in a hospital admission)    Current Facility-Administered Medications   Medication Dose Route Frequency    acetaminophen (TYLENOL) tablet 650 mg  650 mg Oral Q6H PRN    allopurinol (ZYLOPRIM) tablet 300 mg  300 mg Oral Daily    amLODIPine (NORVASC) tablet 10 mg  10 mg Oral Daily    atorvastatin (LIPITOR) tablet 80 mg  80 mg Oral HS    calcitriol (ROCALTROL) capsule 0.25 mcg  0.25 mcg Oral Daily    insulin lispro (HumaLOG) 100 units/mL subcutaneous injection 1-5 Units  1-5 Units Subcutaneous Q6H TRENT    insulin lispro (HumaLOG) 100 units/mL subcutaneous injection 1-5 Units  1-5 Units Subcutaneous HS    magnesium sulfate IVPB (premix) SOLN 1 g  1 g Intravenous Once    metoprolol succinate (TOPROL-XL) 24 hr tablet 12.5 mg  12.5 mg Oral Daily    nicotine (NICODERM CQ) 7 mg/24hr TD 24 hr patch 1 patch  1 patch Transdermal Daily    pantoprazole (PROTONIX) injection 40 mg  40 mg Intravenous Q12H TRENT        No Known Allergies        Objective     Blood pressure 169/71, pulse 58, temperature 97.6 °F (36.4 °C), temperature source Oral, resp. rate 16, SpO2 97%.      Intake/Output Summary (Last 24 hours) at 1/11/2024 0753  Last data filed at 1/10/2024 1738  Gross per 24 hour   Intake 1100 ml   Output --   Net 1100 ml         PHYSICAL EXAM:      General Appearance:   Alert, cooperative, mild distress, appears stated age    HEENT:   Normocephalic, atraumatic, sclera anicteric   Neck:  Supple, symmetrical   Lungs:   Clear to auscultation bilaterally; no rales, rhonchi or wheezing; respirations unlabored    Heart::   Regular rate and rhythm; positive murmur, rub, or gallop.   Abdomen:   Soft, non-tender, non-distended; normal bowel sounds; no masses, no organomegaly    Genitalia:   Deferred    Rectal:   Deferred    Extremities:  No cyanosis, clubbing or edema    Pulses:  2+ and symmetric all extremities    Skin:  Skin color, texture normal, no rashes or lesions    Lymph nodes:  Not assessed  Neuro: alert and oriented x 3  Psych: normal behavior       Lab Results:   Results from last 7 days   Lab Units 01/11/24  0459 01/11/24  0409 01/10/24  1548   WBC Thousand/uL  --  4.69 6.05   HEMOGLOBIN g/dL 6.8* 6.7* 8.9*   HEMATOCRIT % 21.5* 21.6* 27.3*   PLATELETS Thousands/uL  --  151 179   NEUTROS PCT %  --   --  70   LYMPHS PCT %  --   --  19   MONOS PCT %  --   --  7   EOS PCT %  --   --  3     Results from last 7 days   Lab Units 01/11/24  0409 01/10/24  1548   POTASSIUM mmol/L 4.0 4.1   CHLORIDE mmol/L 109* 105   CO2 mmol/L 28 27   BUN mg/dL 50* 55*   CREATININE mg/dL 2.67* 2.97*   CALCIUM mg/dL 8.5 9.1   ALK PHOS U/L  --  61   ALT U/L  --  5*   AST U/L  --  10*         Results from last 7 days   Lab Units 01/10/24  1548   LIPASE u/L <6*       Imaging Studies: I have personally reviewed pertinent imaging studies.    No results found.        Patient was seen and examined by Dr. Siddiqui. All mosley medical decisions were made by  Dr. Siddiqui. Thank you for allowing us to participate in the care of this present patient. We will follow-up with you closely.      Andres Mackey PA-C  Gastroenterology

## 2024-01-11 NOTE — ASSESSMENT & PLAN NOTE
Wt Readings from Last 3 Encounters:   01/10/24 76.5 kg (168 lb 9.6 oz)   12/28/23 75.3 kg (166 lb)   12/25/23 76.7 kg (169 lb)     Home regimen: torsemide 20 mg daily, Toprol XL  Previously on lisinopril, but held during last admission for SHOAIB. Not resumed on discharge pending outpatient labs.    Plan  Held torsemide given SHOAIB  NPO after midnight. Consider sodium and fluid restriction when diet resumed  Monitor I/Os and daily standing weights

## 2024-01-11 NOTE — ASSESSMENT & PLAN NOTE
Patient with PMH of melena/GI bleed in December 2023 and peptic ulcer presents with 1 day history of rectal bleeding noted on toilet paper (dark red to black) after BM. BM themselves are formed and nonbloody. Reports mild LLQ discomfort for past several days. Earlier on the day of admission, patient had 2 BM without blood on toilet paper. Seen by outpatient GI AP on morning of admission, and was instructed to present to the ED.  Notable hospitalization x2 in December for melena/GI bleed. EGD x2 revealed gastric ulcers, which were cauterized and clipped. However, EGD and colonoscopy during both admissions did not reveal overt sources of GI bleed. Colonoscopy revealed small hemorrhoids.  Denies hematuria, urinary symptoms.   FOBT positive in ED  Etiology: hemorrhoids vs peptic ulcer vs diverticular bleed    Plan  Pending EGD this afternoon  S/p 1u pRBC repeat Hgb of 8.2

## 2024-01-11 NOTE — PROGRESS NOTES
[facility]  Progress Note  Name: Jay Harrison I  MRN: [id number]  Unit/Bed#: ANKITA I Date of Admission: 1/10/2024   Date of Service: 1/11/2024 I Hospital Day: 0    Assessment/Plan   * GI bleed  Assessment & Plan  Patient with PMH of melena/GI bleed in December 2023 and peptic ulcer presents with 1 day history of rectal bleeding noted on toilet paper (dark red to black) after BM. BM themselves are formed and nonbloody. Reports mild LLQ discomfort for past several days. Earlier on the day of admission, patient had 2 BM without blood on toilet paper. Seen by outpatient GI AP on morning of admission, and was instructed to present to the ED.  Notable hospitalization x2 in December for melena/GI bleed. EGD x2 revealed gastric ulcers, which were cauterized and clipped. However, EGD and colonoscopy during both admissions did not reveal overt sources of GI bleed. Colonoscopy revealed small hemorrhoids.  Denies hematuria, urinary symptoms.   FOBT positive in ED  Etiology: hemorrhoids vs peptic ulcer vs diverticular bleed    Plan  Pending EGD this afternoon  S/p 1u pRBC repeat Hgb of 8.2    Anemia  Assessment & Plan  On admission, Hgb 8.9, down from 10 2 days ago  Patient presented with 1 day history of bloody streaks noted on toilet paper after BM. Denies light-headedness, dizziness, nausea, vomiting, shortness of breath, weakness.  PMH of melena/GI bleed, peptic ulcer  Given history of CAD, recommended threshold for Hgb > 8    Plan  Repeat hgb this evening    Chronic pancreatitis (HCC)  Assessment & Plan  Presents with mild LLQ discomfort. Abdominal exam unremarkable.  Lipase < 6  Takes Creon 3 times daily with meals  12/22/2023 CT abdomen (previous admission) - Sequela of chronic pancreatitis. A 2 cm hyperdense structure in the head of the pancreas is indeterminate but in retrospect is stable since at least 2021. Hematoma vs thrombosed pseudoaneurysm vs hyperdense cystic lesion      Plan  Follow-up in 6-12 months with MRI    Acute kidney injury superimposed on stage 4 chronic kidney disease ([facility])  Assessment & Plan  Lab Results   Component Value Date    EGFR 21 01/11/2024    EGFR 18 01/10/2024    EGFR 22 01/08/2024    CREATININE 2.67 (H) 01/11/2024    CREATININE 2.97 (H) 01/10/2024    CREATININE 2.61 (H) 01/08/2024     Baseline appears to be 2-2.5  Etiology: prerenal in the setting of dehydration/decreased PO intake vs GI bleed    Plan  Monitor off fluids   Hold torsemide  Monitor BMP  Monitor I/Os  Urinary retention protocol    Chronic diastolic CHF (congestive heart failure) ([facility])  Assessment & Plan  Wt Readings from Last 3 Encounters:   01/10/24 76.5 kg (168 lb 9.6 oz)   12/28/23 75.3 kg (166 lb)   12/25/23 76.7 kg (169 lb)     Home regimen: torsemide 20 mg daily, Toprol XL  Previously on lisinopril, but held during last admission for SHOAIB. Not resumed on discharge pending outpatient labs.    Plan  Held torsemide given SHOAIB  NPO after midnight. Consider sodium and fluid restriction when diet resumed  Monitor I/Os and daily standing weights        Hyperlipidemia  Assessment & Plan  Continue home atorvastatin 80 mg daily    GERD (gastroesophageal reflux disease)  Assessment & Plan  Replaced PO pantoprazole 40 mg BID with IV    T2DM (type 2 diabetes mellitus) ([facility])  Assessment & Plan  Lab Results   Component Value Date    HGBA1C 6.1 (H) 12/09/2023       Recent Labs     01/10/24  2039 01/11/24  0137 01/11/24  0745 01/11/24  1113   POCGLU 112 117 121 125         Blood Sugar Average: Last 72 hrs:  (P) 121.8    Home regimen: glimepiride  NPO after midnight  SSI while inpatient  Accuchecks q6h  Hypoglycemia protocol  Diabetic diet with fluid and sodium restriction when resuming diet      CAD (coronary artery disease)  Assessment & Plan  PMH of CAD s/p CABG with PCI of RCA 5/22, aortic stenosis s/p TAVR 6/22   Home regimen: aspirin, Plavix, Toprol XL, atorvastatin    Plan  Held aspirin, Plavix in the  setting of GI bleed  Consider if DAPT is really required, or whether patient can continue with monotherapy    Hypertension  Assessment & Plan  Home regimen: amlodipine 10 mg daily, Toprol XL 12.5 mg daily  Lisinopril held during previous admission due to SHOAIB. Not resumed on discharge pending repeat labs as outpatient.    Plan  Continue amlodipine, Toprol XL           VTE Pharmacologic Prophylaxis: VTE Score: 4 Moderate Risk (Score 3-4) - Pharmacological DVT Prophylaxis Contraindicated. Sequential Compression Devices Ordered.    Mobility:           Patient Centered Rounds: I performed bedside rounds with nursing staff today.  Discussions with Specialists or Other Care Team Provider: GI    Education and Discussions with Family / Patient: Patient declined call to .     Current Length of Stay: 0 day(s)  Current Patient Status: Observation   Discharge Plan: Anticipate discharge in 24-48 hrs to home.    Code Status: Level 3 - DNAR and DNI    Subjective:   Pt required 1 unit of RBC today. Patient denies any bloody bowel movement since admission. Pt reports mild abd pain. Pt denies nausea, vomiting, chest pain, sob.     Objective:     Vitals:   Temp (24hrs), Av.4 °F (36.3 °C), Min:97.1 °F (36.2 °C), Max:97.7 °F (36.5 °C)    Temp:  [97.1 °F (36.2 °C)-97.7 °F (36.5 °C)] 97.2 °F (36.2 °C)  HR:  [50-72] 53  Resp:  [16-18] 18  BP: (123-171)/(55-93) 171/72  SpO2:  [96 %-100 %] 100 %  There is no height or weight on file to calculate BMI.     Input and Output Summary (last 24 hours):     Intake/Output Summary (Last 24 hours) at 2024 1330  Last data filed at 1/10/2024 1738  Gross per 24 hour   Intake 1100 ml   Output --   Net 1100 ml       Physical Exam:   Physical Exam  Constitutional:       Appearance: He is not ill-appearing.   HENT:      Head: Normocephalic and atraumatic.      Mouth/Throat:      Mouth: Mucous membranes are dry.   Eyes:      Extraocular Movements: Extraocular movements intact.       Pupils: Pupils are equal, round, and reactive to light.      Comments: Pale conjunctivae   Cardiovascular:      Rate and Rhythm: Normal rate and regular rhythm.      Pulses: Normal pulses.      Heart sounds: Murmur heard.   Abdominal:      General: Abdomen is flat. Bowel sounds are normal. There is no distension.      Palpations: Abdomen is soft.      Tenderness: There is abdominal tenderness (LLQ). There is no right CVA tenderness, left CVA tenderness or guarding.   Musculoskeletal:         General: Normal range of motion.      Right lower leg: No edema.      Left lower leg: No edema.   Skin:     General: Skin is warm and dry.   Neurological:      General: No focal deficit present.      Mental Status: He is alert and oriented to person, place, and time.      Motor: No weakness.         Additional Data:     Labs:  Results from last 7 days   Lab Units 01/11/24  1115 01/11/24  0459 01/11/24  0409 01/10/24  1548   WBC Thousand/uL  --   --  4.69 6.05   HEMOGLOBIN g/dL 8.2* 6.8* 6.7* 8.9*   HEMATOCRIT %  --  21.5* 21.6* 27.3*   PLATELETS Thousands/uL  --   --  151 179   NEUTROS PCT %  --   --   --  70   LYMPHS PCT %  --   --   --  19   MONOS PCT %  --   --   --  7   EOS PCT %  --   --   --  3     Results from last 7 days   Lab Units 01/11/24  0409 01/10/24  1548   SODIUM mmol/L 141 140   POTASSIUM mmol/L 4.0 4.1   CHLORIDE mmol/L 109* 105   CO2 mmol/L 28 27   BUN mg/dL 50* 55*   CREATININE mg/dL 2.67* 2.97*   ANION GAP mmol/L 4 8   CALCIUM mg/dL 8.5 9.1   ALBUMIN g/dL  --  3.8   TOTAL BILIRUBIN mg/dL  --  0.34   ALK PHOS U/L  --  61   ALT U/L  --  5*   AST U/L  --  10*   GLUCOSE RANDOM mg/dL 114 109         Results from last 7 days   Lab Units 01/11/24  1113 01/11/24  0745 01/11/24  0137 01/10/24  2039 01/10/24  1629   POC GLUCOSE mg/dl 125 121 117 112 134               Lines/Drains:  Invasive Devices       Peripheral Intravenous Line  Duration             Peripheral IV 01/10/24 Left Antecubital <1 day    Peripheral IV  01/11/24 Distal;Dorsal (posterior);Left Forearm <1 day                          Imaging: No pertinent imaging reviewed.    Recent Cultures (last 7 days):         Last 24 Hours Medication List:   Current Facility-Administered Medications   Medication Dose Route Frequency Provider Last Rate    acetaminophen  650 mg Oral Q6H PRN Tierra Tom MD      allopurinol  300 mg Oral Daily Tierra Tom MD      amLODIPine  10 mg Oral Daily Tierra Tom MD      atorvastatin  80 mg Oral HS Tierra Tom MD      calcitriol  0.25 mcg Oral Daily Tierra Tom MD      insulin lispro  1-5 Units Subcutaneous Q6H [facility] Tierra Tom MD      insulin lispro  1-5 Units Subcutaneous HS Tierra Tom MD      metoprolol succinate  12.5 mg Oral Daily Tierra Tom MD      nicotine  1 patch Transdermal Daily Tierra Tom MD      pantoprazole  40 mg Intravenous Q12H [facility] Tierra Tom MD          Today, Patient Was Seen By: Trevor Younger MD    **Please Note: This note may have been constructed using a voice recognition system.**

## 2024-01-11 NOTE — ANESTHESIA PREPROCEDURE EVALUATION
Procedure:  EGD    Relevant Problems   CARDIO   (+) CAD (coronary artery disease)   (+) Hyperlipidemia   (+) Hypertension   (+) Progressive angina (HCC)   (+) Sinus bradycardia      ENDO   (+) Secondary hyperparathyroidism of renal origin (HCC)   (+) T2DM (type 2 diabetes mellitus) (HCC)      GI/HEPATIC   (+) Chronic pancreatitis (HCC)   (+) GERD (gastroesophageal reflux disease)   (+) GI bleed   (+) Peptic ulcer      /RENAL   (+) Acute kidney injury superimposed on chronic kidney disease    (+) Acute kidney injury superimposed on stage 4 chronic kidney disease (HCC)   (+) Benign hypertension with chronic kidney disease, stage III (HCC)   (+) CKD (chronic kidney disease) stage 4, GFR 15-29 ml/min (HCC)   (+) Renal cyst, left      HEMATOLOGY   (+) Acute blood loss anemia   (+) Anemia   (+) Anemia due to stage 4 chronic kidney disease    (+) Iron deficiency anemia      NEURO/PSYCH   (+) Atherosclerosis of native arteries of extremities with intermittent claudication, bilateral legs (HCC)   (+) Progressive angina (HCC)   (+) Tension headache        Physical Exam    Airway    Mallampati score: II  TM Distance: >3 FB  Neck ROM: full     Dental       Cardiovascular  Cardiovascular exam normal    Pulmonary  Pulmonary exam normal     Other Findings        Anesthesia Plan  ASA Score- 4 Emergent    Anesthesia Type- IV sedation with anesthesia with ASA Monitors.         Additional Monitors:     Airway Plan:     Comment: Discussed code status, wishes to maintain DNR/DNI perioperatively.  .       Plan Factors-Exercise tolerance (METS): >4 METS.    Chart reviewed. EKG reviewed. Imaging results reviewed. Existing labs reviewed. Patient summary reviewed.    Patient is not a current smoker.              Induction- intravenous.    Postoperative Plan-     Informed Consent- Anesthetic plan and risks discussed with patient.  I personally reviewed this patient with the CRNA. Discussed and agreed on the Anesthesia Plan with the  CRNA..

## 2024-01-11 NOTE — CASE MANAGEMENT
Case Management Assessment    Patient name Jay Roach Jr.  Location ED-ED MRN [id number]  : 1945 Date 2024       Current Admission Date: 1/10/2024  Current Admission Diagnosis:GI bleed   Patient Active Problem List    Diagnosis Date Noted    Peptic ulcer 01/10/2024    History of GI bleed 01/10/2024    Colon cancer screening 01/10/2024    Anemia 01/10/2024    Melena 2023    Chronic pancreatitis ([facility]) 2023    GI bleed 2023    Acute blood loss anemia 2023    Acute kidney injury superimposed on stage 4 chronic kidney disease ([facility]) 2023    Vitamin D deficiency 2023    Dependence on respirator (ventilator) status ([facility]) 2023    Platelets decreased ([facility]) 2023    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 ([facility]) 2023    Anemia due to stage 4 chronic kidney disease  2023    Hyperuricemia 2023    Benign hypertension with chronic kidney disease, stage III ([facility]) 2022    Secondary hyperparathyroidism of renal origin ([facility]) 2022    S/P TAVR (transcatheter aortic valve replacement) 2022    Elevated serum creatinine 2022    Chronic diastolic CHF (congestive heart failure) ([facility]) 2022    Acute kidney injury superimposed on chronic kidney disease  2022    Iron deficiency anemia 2022    Persistent proteinuria, unspecified 01/10/2022    CKD (chronic kidney disease) stage 4, GFR 15-29 ml/min ([facility]) 01/10/2022    Leukopenia 01/10/2022    Hypomagnesemia 01/10/2022    Renal cyst, left 01/10/2022    GERD (gastroesophageal reflux disease)     Hyperlipidemia     T2DM (type 2 diabetes mellitus) ([facility]) 2021    Sinus bradycardia 10/08/2020    Calcific tendinitis of right shoulder region 2019    Right shoulder pain 2019    Cigarette nicotine dependence with nicotine-induced disorder 10/22/2018    Stenosis of noncoronary bypass graft ([facility]) 2016     Asymptomatic bilateral carotid artery stenosis 08/12/2016    S/P CABG (coronary artery bypass graft) 08/12/2016    Hypertension 08/12/2016    Aorto-iliac disease (HCC) 08/12/2016    Renal artery stenosis, native, bilateral ([facility]) 08/12/2016    CAD (coronary artery disease) 08/12/2016    Progressive angina ([facility]) 08/12/2016    Atherosclerosis of native arteries of extremities with intermittent claudication, bilateral legs ([facility])     PVD (peripheral vascular disease) ([facility])     Tension headache 10/24/2013      LOS (days): 0  Geometric Mean LOS (GMLOS) (days):   Days to GMLOS:     OBJECTIVE:              Current admission status: Observation       Preferred Pharmacy:   [facility] [place] - [facility]  [place] [address]  Phone: [phone] Fax: [phone]    [facility] #[id number] - [place] - [address] [place] [id number]  Phone: [phone] Fax: [phone]    Primary Care Provider: Simón Hadley MD    Primary Insurance: MEDICARE  Secondary Insurance: COMMERCIAL MISCELLANEOUS    ASSESSMENT:  Active Health Care Proxies       Dom Roach [facility] Care Agent - Son   Primary Phone: [phone] (Mobile)                           Readmission Root Cause  30 Day Readmission: Yes  Who directed you to return to the hospital?: Family  Did you understand whom to contact if you had questions or problems?: Yes  Did you get your prescriptions before you left the hospital?: Yes  Were you able to get your prescriptions filled when you left the hospital?: Yes  Did you take your medications as prescribed?: Yes  Were you able to get to your follow-up appointments?: Yes  Patient was readmitted due to: GI bleed    Patient Information  Admitted from:: Home  Mental Status: Alert  During Assessment patient was accompanied by: Not accompanied during assessment  Assessment information provided by:: Patient  Primary Caregiver: Self  Support Systems: Self,  Son  What city do you live in?: [place]  Home entry access options. Select all that apply.: Stairs  Number of steps to enter home.: One Flight  Do the steps have railings?: No  Type of Current Residence: Apartment  Floor Level: 2  Upon entering residence, is there a bedroom on the main floor (no further steps)?: Yes  Upon entering residence, is there a bathroom on the main floor (no further steps)?: Yes  Living Arrangements: Lives Alone    Activities of Daily Living Prior to Admission  Functional Status: Independent  Completes ADLs independently?: Yes  Ambulates independently?: Yes  Does patient use assisted devices?: No  Does patient currently own DME?: No  Does patient have a history of Outpatient Therapy (PT/OT)?: No  Does the patient have a history of Short-Term Rehab?: No  Does patient have a history of HHC?: Yes  Does patient currently have HHC?: No         Patient Information Continued  Income Source: Pension/longterm  Does patient have prescription coverage?: Yes  Does patient receive dialysis treatments?: No  Does patient have a history of substance abuse?: No  Does patient have a history of Mental Health Diagnosis?: No         Means of Transportation  Means of Transport to Appts:: Family transport      Housing Stability: Low Risk  (1/11/2024)    Housing Stability Vital Sign     Unable to Pay for Housing in the Last Year: No     Number of Places Lived in the Last Year: 1     Unstable Housing in the Last Year: No   Food Insecurity: No Food Insecurity (1/11/2024)    Hunger Vital Sign     Worried About Running Out of Food in the Last Year: Never true     Ran Out of Food in the Last Year: Never true   Transportation Needs: No Transportation Needs (1/11/2024)    PRAPARE - Transportation     Lack of Transportation (Medical): No     Lack of Transportation (Non-Medical): No   Utilities: Not At Risk (1/11/2024)    [facility] Utilities     Threatened with loss of utilities: No       CM spoke with patient son who relates  patient is indepent at baseline. Lives alone in his apt and is able to walk the stairs.    CM team to follow up.

## 2024-01-11 NOTE — H&P
[facility]  H&P  Name: Jay Roach Jr. 78 y.o. male I MRN: [id number]  Unit/Bed#: ED-01 I Date of Admission: 1/10/2024   Date of Service: 1/10/2024 I Hospital Day: 0      Assessment/Plan   * GI bleed  Assessment & Plan  Patient with PMH of melena/GI bleed in December 2023 and peptic ulcer presents with 1 day history of rectal bleeding noted on toilet paper (dark red to black) after BM. BM themselves are formed and nonbloody. Reports mild LLQ discomfort for past several days. Earlier on the day of admission, patient had 2 BM without blood on toilet paper. Seen by outpatient GI AP on morning of admission, and was instructed to present to the ED.  Notable hospitalization x2 in December for melena/GI bleed. EGD x2 revealed gastric ulcers, which were cauterized and clipped. However, EGD and colonoscopy during both admissions did not reveal overt sources of GI bleed. Colonoscopy revealed small hemorrhoids.  Denies hematuria, urinary symptoms.   FOBT positive in ED  Etiology: hemorrhoids vs peptic ulcer vs diverticular bleed    Plan  GI consulted, appreciate recommendations  Clear liquid diet for now, NPO after midnight  Monitor bowel movements for bleeding  Hold aspirin, Plavix, DVT ppx  Repeat Hgb at 10 PM    Anemia  Assessment & Plan  On admission, Hgb 8.9, down from 10 2 days ago  Patient presented with 1 day history of bloody streaks noted on toilet paper after BM. Denies light-headedness, dizziness, nausea, vomiting, shortness of breath, weakness.  PMH of melena/GI bleed, peptic ulcer  Given history of CAD, recommended threshold for Hgb > 8    Plan  Repeat hemoglobin at 10 PM  Transfuse if < 7.5  Consent obtained and attached to chart in ED  Type and screen completed    Acute kidney injury superimposed on stage 4 chronic kidney disease (HCC)  Assessment & Plan  Lab Results   Component Value Date    EGFR 18 01/10/2024    EGFR 22 01/08/2024    EGFR 24 12/28/2023    CREATININE 2.97 (H)  01/10/2024    CREATININE 2.61 (H) 01/08/2024    CREATININE 2.41 (H) 12/28/2023     Baseline appears to be 2-2.5  Etiology: prerenal in the setting of dehydration/decreased PO intake vs GI bleed    Plan  Monitor off fluids   Hold torsemide  Monitor BMP  Monitor I/Os  Urinary retention protocol    Chronic pancreatitis ([facility])  Assessment & Plan  Presents with mild LLQ discomfort. Abdominal exam unremarkable.  Lipase < 6  Takes Creon 3 times daily with meals  12/22/2023 CT abdomen (previous admission) - Sequela of chronic pancreatitis. A 2 cm hyperdense structure in the head of the pancreas is indeterminate but in retrospect is stable since at least 2021. Hematoma vs thrombosed pseudoaneurysm vs hyperdense cystic lesion     Plan  Follow-up in 6-12 months with MRI    Chronic diastolic CHF (congestive heart failure) ([facility])  Assessment & Plan  Wt Readings from Last 3 Encounters:   01/10/24 76.5 kg (168 lb 9.6 oz)   12/28/23 75.3 kg (166 lb)   12/25/23 76.7 kg (169 lb)     Home regimen: torsemide 20 mg daily, Toprol XL  Previously on lisinopril, but held during last admission for SHOAIB. Not resumed on discharge pending outpatient labs.    Plan  Held torsemide given SHOAIB  NPO after midnight. Consider sodium and fluid restriction when diet resumed  Monitor I/Os and daily standing weights        Hyperlipidemia  Assessment & Plan  Continue home atorvastatin 80 mg daily    GERD (gastroesophageal reflux disease)  Assessment & Plan  Replaced PO pantoprazole 40 mg BID with IV    T2DM (type 2 diabetes mellitus) ([facility])  Assessment & Plan  Lab Results   Component Value Date    HGBA1C 6.1 (H) 12/09/2023       Recent Labs     01/10/24  1629 01/10/24  2039   POCGLU 134 112       Blood Sugar Average: Last 72 hrs:  (P) 123    Home regimen: glimepiride  NPO after midnight  SSI while inpatient  Accuchecks q6h  Hypoglycemia protocol  Diabetic diet with fluid and sodium restriction when resuming diet      CAD (coronary artery disease)  Assessment &  Plan  PMH of CAD s/p CABG with PCI of RCA 5/22, aortic stenosis s/p TAVR 6/22   Home regimen: aspirin, Plavix, Toprol XL, atorvastatin    Plan  Held aspirin, Plavix in the setting of GI bleed  Consider if DAPT is really required, or whether patient can continue with monotherapy    Hypertension  Assessment & Plan  Home regimen: amlodipine 10 mg daily, Toprol XL 12.5 mg daily  Lisinopril held during previous admission due to SHOAIB. Not resumed on discharge pending repeat labs as outpatient.    Plan  Continue amlodipine, Toprol XL         VTE Pharmacologic Prophylaxis: VTE Score: 4 Moderate Risk (Score 3-4) - Pharmacological DVT Prophylaxis Contraindicated. Sequential Compression Devices Ordered.  Code Status: Level 3 - DNAR and DNI   Discussion with family: Patient declined call to .     Anticipated Length of Stay: Patient will be admitted on an observation basis with an anticipated length of stay of less than 2 midnights secondary to GI bleed, SHOAIB.    Chief Complaint: rectal bleeding    History of Present Illness:  Jay Roach Jr. is a 78 y.o. male with a PMH of melena/GI bleeding in December 2023, GERD, peptic ulcer, HTN, CAD s/p PCI, AS s/p TAVR, HFpEF, T2DM who presents for evaluation of 1 day history of bleeding per rectum noted on toilet paper after BM and mild LLQ discomfort. Describes color of blood as dark red-black. The bowel movements themselves are nonbloody and formed. This appears to have resolved earlier today with normal BM x2. Seen by outpatient GI AP on morning of admission, and was instructed to present to the ED. Notably, patient was hospitalized x 2 in December 2023 for melena/GI bleed.  EGD x 2 revealed gastric ulcers, which were cauterized and clipped. However, EGD and colonoscopy during both admissions did not reveal overt sources of GI bleed. Colonoscopy revealed small hemorrhoids. Patient denies any headaches, lightheadedness, dizziness, chest pain, shortness of breath,  nausea, vomiting, weakness.     In the ED, FOBT positive. Hgb 8.9, dropped from 10 2 days ago. Creatinine elevated above baseline. Received 1L NS bolus and IV pantoprazole 40 mg in ED. Patient admitted to [facility] under observation status for further workup/management.    Review of Systems:  Review of Systems   Constitutional:  Negative for chills, fatigue and fever.   HENT:  Negative for ear pain and sore throat.    Eyes:  Negative for pain and visual disturbance.   Respiratory:  Negative for cough and shortness of breath.    Cardiovascular:  Negative for chest pain and palpitations.   Gastrointestinal:  Positive for abdominal pain (mild LLQ discomfort) and anal bleeding. Negative for vomiting.   Genitourinary:  Negative for dysuria and hematuria.   Musculoskeletal:  Negative for arthralgias and back pain.   Skin:  Negative for color change and rash.   Neurological:  Negative for dizziness, seizures, syncope, weakness, light-headedness and headaches.   All other systems reviewed and are negative.      Past Medical and Surgical History:   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 ([facility]) 09/10/2021    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 (HCC) 2/24/2023    Basal cell carcinoma     right cheek    CAD (coronary artery disease)     Carotid stenosis, asymptomatic, bilateral     Chronic kidney disease     Colon polyp     Diabetes (HCC)     type 2, non-insulin dependent    Diabetes mellitus (HCC)     GERD (gastroesophageal reflux disease)     History of nephrolithiasis     Hyperlipidemia     Hypertension     Left foot pain 11/30/2022    PAD (peripheral artery disease) (HCC)     Severe aortic stenosis     Squamous cell skin cancer 11/22/2022    left superior helix       Past Surgical History:   Procedure Laterality Date    APPENDECTOMY      CARDIAC CATHETERIZATION N/A 05/05/2022    Procedure:  CARDIAC RHC/LHC;  Surgeon: Arvin Sanchez DO;  Location: [facility];  Service: Cardiology    CARDIAC CATHETERIZATION N/A 05/05/2022    Procedure: Cardiac Coronary Angiogram;  Surgeon: Arvin Sanchez DO;  Location: [facility];  Service: Cardiology    CARDIAC CATHETERIZATION N/A 05/24/2022    Procedure: Cardiac pci;  Surgeon: Damien Jeter MD;  Location: [facility];  Service: Cardiology    CARDIAC CATHETERIZATION N/A 06/14/2022    Procedure: CARDIAC TAVR;  Surgeon: Nahum Vaz MD;  Location: [facility] MA[facility];  Service: Cardiology    COLECTOMY      COLONOSCOPY  2013    CORONARY ARTERY BYPASS GRAFT  2013    X 2    FEMORAL ARTERY - POPLITEAL ARTERY BYPASS GRAFT      HEMORRHOID SURGERY      IR AORTAGRAM WITH RUN-OFF  11/19/2018    IR AORTAGRAM WITH RUN-OFF  3/2/2023    IR LOWER EXTREMITY ANGIOGRAM  3/23/2023    MOHS SURGERY Left 01/11/2023    SCC left superior helix-Dr Guy    VA SLCTV CATHJ 3RD+ ORD SLCTV ABDL PEL/LXTR BRNCH Left 08/12/2016    Procedure: LEFT FEMORAL ARTERIOGRAM; BALLOON ANGIOPLASTY; SFA  AND FEMORAL AT VEIN GRAFT;  Surgeon: Nicholas Urena MD;  Location: [facility] MAIN [facility];  Service: Vascular    VA TEAEC W/WO PATCH GRAFT COMMON FEMORAL Right 3/23/2023    Procedure: Common femoral endarterectomy&antegrade intervention of SFA, popliteal artery w/ shockwave;  Surgeon: Eagle Higuera MD;  Location: [facility];  Service: Vascular    VA TRANSCATHETER TRANSAPICAL REPLACEMT AORTIC VALVE N/A 06/14/2022    Procedure: REPLACEMENT AORTIC VALVE TRANSCATHETER (TAVR) TRANSAPICAL 29MM IRVIN KYLER S3 ULTRA VALVE(ACCESS ON LEFT) ELANA;  Surgeon: Amarjit Gordon DO;  Location: [facility] MA[facility];  Service: Cardiac Surgery    SKIN CANCER EXCISION      TONSILLECTOMY AND ADENOIDECTOMY         Meds/Allergies:  Prior to Admission medications    Medication Sig Start Date End Date Taking? Authorizing Provider   allopurinol (ZYLOPRIM) 300 mg tablet TAKE 1 TABLET DAILY 11/16/23  Yes Simón Hadley,  MD   amLODIPine (NORVASC) 10 mg tablet TAKE 1 TABLET DAILY 11/27/23  Yes Israel Sanchez MD   aspirin (ECOTRIN LOW STRENGTH) 81 mg EC tablet Take 81 mg by mouth daily   Yes Bethel Rogers MD   atorvastatin (LIPITOR) 80 mg tablet TAKE 1 TABLET DAILY AT     BEDTIME 2/22/23  Yes Israel Sanchez MD   calcitriol (ROCALTROL) 0.25 mcg capsule Take 1 capsule (0.25 mcg total) by mouth daily 9/5/23  Yes Marcel Muñoz MD   clopidogrel (PLAVIX) 75 mg tablet Take 1 tablet (75 mg total) by mouth daily 8/15/23  Yes Israel Sanchez MD   glimepiride (AMARYL) 1 mg tablet Take 1 tablet (1 mg total) by mouth daily with breakfast 11/1/23 4/29/24 Yes Simón Hadley MD   metoprolol succinate (TOPROL-XL) 25 mg 24 hr tablet Take 0.5 tablets (12.5 mg total) by mouth daily 8/15/23  Yes Israel Sanchez MD   pantoprazole (PROTONIX) 40 mg tablet Take 1 tablet (40 mg total) by mouth 2 (two) times a day 12/10/23  Yes Amarilis Tom MD   torsemide (DEMADEX) 20 mg tablet TAKE 0.5 TABLETS (10 MG TOTAL) BY MOUTH DAILY TAKES 10 MG ONCE A DAY. 11/7/23  Yes Israel Sanchez MD   D3-50 1.25 MG (44828 UT) capsule TAKE 1 CAPSULE ONCE WEEKLY  Patient not taking: Reported on 12/28/2023 11/16/23   Marcel Muñoz MD   pancrelipase, Lip-Prot-Amyl, (CREON) 24,000 units Take 24,000 units of lipase by mouth 3 (three) times a day with meals 10/24/23   Flores Gonzalez PA-C     I have reviewed home medications with patient personally.    Allergies: No Known Allergies    Social History:  Marital Status:    Occupation: Retired  Patient Pre-hospital Living Situation: Apartment, Alone  Patient Pre-hospital Level of Mobility: walks  Patient Pre-hospital Diet Restrictions: Diabetic  Substance Use History:   Social History     Substance and Sexual Activity   Alcohol Use Not Currently    Comment: rare     Social History     Tobacco Use   Smoking Status Every Day    Current packs/day: 0.25    Average packs/day: 0.3 packs/day for 50.0 years (12.5 ttl pk-yrs)     Types: Cigarettes    Passive exposure: Current   Smokeless Tobacco Never     Social History     Substance and Sexual Activity   Drug Use No       Family History:  Family History   Problem Relation Age of Onset    Heart attack Father     Other Sister         bypass and vlave replacement    Stroke Paternal Uncle     Arrhythmia Neg Hx     Asthma Neg Hx     Clotting disorder Neg Hx     Fainting Neg Hx     Anuerysm Neg Hx     Hypertension Neg Hx         unsure     Hyperlipidemia Neg Hx     Heart failure Neg Hx        Physical Exam:     Vitals:   Blood Pressure: 155/68 (01/10/24 1730)  Pulse: 68 (01/10/24 1730)  Temperature: (!) 97.1 °F (36.2 °C) (01/10/24 1540)  Temp Source: Oral (01/10/24 1540)  Respirations: 18 (01/10/24 1730)  SpO2: 98 % (01/10/24 1730)    Physical Exam  Constitutional:       Appearance: He is not ill-appearing.   HENT:      Head: Normocephalic and atraumatic.      Mouth/Throat:      Mouth: Mucous membranes are dry.   Eyes:      Extraocular Movements: Extraocular movements intact.      Pupils: Pupils are equal, round, and reactive to light.      Comments: Pale conjunctivae   Cardiovascular:      Rate and Rhythm: Normal rate and regular rhythm.      Pulses: Normal pulses.      Heart sounds: Murmur heard.   Abdominal:      General: Abdomen is flat. Bowel sounds are normal. There is no distension.      Palpations: Abdomen is soft.      Tenderness: There is no abdominal tenderness. There is no right CVA tenderness, left CVA tenderness or guarding.   Musculoskeletal:         General: Normal range of motion.      Right lower leg: No edema.      Left lower leg: No edema.   Skin:     General: Skin is warm and dry.   Neurological:      General: No focal deficit present.      Mental Status: He is alert and oriented to person, place, and time.      Motor: No weakness.       Additional Data:     Lab Results:  Results from last 7 days   Lab Units 01/10/24  1548   WBC Thousand/uL 6.05   HEMOGLOBIN g/dL 8.9*    HEMATOCRIT % 27.3*   PLATELETS Thousands/uL 179   NEUTROS PCT % 70   LYMPHS PCT % 19   MONOS PCT % 7   EOS PCT % 3     Results from last 7 days   Lab Units 01/10/24  1548   SODIUM mmol/L 140   POTASSIUM mmol/L 4.1   CHLORIDE mmol/L 105   CO2 mmol/L 27   BUN mg/dL 55*   CREATININE mg/dL 2.97*   ANION GAP mmol/L 8   CALCIUM mg/dL 9.1   ALBUMIN g/dL 3.8   TOTAL BILIRUBIN mg/dL 0.34   ALK PHOS U/L 61   ALT U/L 5*   AST U/L 10*   GLUCOSE RANDOM mg/dL 109         Results from last 7 days   Lab Units 01/10/24  2039 01/10/24  1629   POC GLUCOSE mg/dl 112 134               Lines/Drains:  Invasive Devices       Peripheral Intravenous Line  Duration             Peripheral IV 01/10/24 Left Antecubital <1 day                        Imaging: No pertinent imaging reviewed.  No orders to display       EKG and Other Studies Reviewed on Admission:   EKG: No EKG obtained.    ** Please Note: This note has been constructed using a voice recognition system. **

## 2024-01-11 NOTE — ANESTHESIA POSTPROCEDURE EVALUATION
Post-Op Assessment Note    CV Status:  Stable  Pain Score: 0    Pain management: adequate       Mental Status:  Alert and awake   Hydration Status:  Stable   PONV Controlled:  None   Airway Patency:  Patent     Post Op Vitals Reviewed: Yes      Staff: CRNA               BP   129/53   Temp   97   Pulse  45   Resp   16   SpO2   100

## 2024-01-11 NOTE — ASSESSMENT & PLAN NOTE
Lab Results   Component Value Date    HGBA1C 6.1 (H) 12/09/2023       Recent Labs     01/10/24  1629 01/10/24  2039   POCGLU 134 112       Blood Sugar Average: Last 72 hrs:  (P) 123    Home regimen: glimepiride  NPO after midnight  SSI while inpatient  Accuchecks q6h  Hypoglycemia protocol  Diabetic diet with fluid and sodium restriction when resuming diet

## 2024-01-11 NOTE — ASSESSMENT & PLAN NOTE
Lab Results   Component Value Date    HGBA1C 6.1 (H) 12/09/2023       Recent Labs     01/10/24  2039 01/11/24  0137 01/11/24  0745 01/11/24  1113   POCGLU 112 117 121 125         Blood Sugar Average: Last 72 hrs:  (P) 121.8    Home regimen: glimepiride  NPO after midnight  SSI while inpatient  Accuchecks q6h  Hypoglycemia protocol  Diabetic diet with fluid and sodium restriction when resuming diet

## 2024-01-11 NOTE — ASSESSMENT & PLAN NOTE
Presents with mild LLQ discomfort. Abdominal exam unremarkable.  Lipase < 6  Takes Creon 3 times daily with meals  12/22/2023 CT abdomen (previous admission) - Sequela of chronic pancreatitis. A 2 cm hyperdense structure in the head of the pancreas is indeterminate but in retrospect is stable since at least 2021. Hematoma vs thrombosed pseudoaneurysm vs hyperdense cystic lesion     Plan  Follow-up in 6-12 months with MRI

## 2024-01-11 NOTE — ASSESSMENT & PLAN NOTE
Lab Results   Component Value Date    EGFR 18 01/10/2024    EGFR 22 01/08/2024    EGFR 24 12/28/2023    CREATININE 2.97 (H) 01/10/2024    CREATININE 2.61 (H) 01/08/2024    CREATININE 2.41 (H) 12/28/2023     Baseline appears to be 2-2.5  Etiology: prerenal in the setting of dehydration/decreased PO intake vs GI bleed    Plan  Monitor off fluids   Hold torsemide  Monitor BMP  Monitor I/Os  Urinary retention protocol

## 2024-01-11 NOTE — ED NOTES
Per Trevor Younger, duplicate blood order. Pt only to receive 1 unit at this time.     Shefali Hastings RN  01/11/24 1146

## 2024-01-11 NOTE — ASSESSMENT & PLAN NOTE
Patient with PMH of melena/GI bleed in December 2023 and peptic ulcer presents with 1 day history of rectal bleeding noted on toilet paper (dark red to black) after BM. BM themselves are formed and nonbloody. Reports mild LLQ discomfort for past several days. Earlier on the day of admission, patient had 2 BM without blood on toilet paper. Seen by outpatient GI AP on morning of admission, and was instructed to present to the ED.  Notable hospitalization x2 in December for melena/GI bleed. EGD x2 revealed gastric ulcers, which were cauterized and clipped. However, EGD and colonoscopy during both admissions did not reveal overt sources of GI bleed. Colonoscopy revealed small hemorrhoids.  Denies hematuria, urinary symptoms.   FOBT positive in ED  Etiology: hemorrhoids vs peptic ulcer vs diverticular bleed    Plan  GI consulted, appreciate recommendations  Clear liquid diet for now, NPO after midnight  Monitor bowel movements for bleeding  Hold aspirin, Plavix, DVT ppx  Repeat Hgb at 10 PM

## 2024-01-11 NOTE — ASSESSMENT & PLAN NOTE
PMH of CAD s/p CABG with PCI of RCA 5/22, aortic stenosis s/p TAVR 6/22   Home regimen: aspirin, Plavix, Toprol XL, atorvastatin    Plan  Held aspirin, Plavix in the setting of GI bleed  Consider if DAPT is really required, or whether patient can continue with monotherapy

## 2024-01-11 NOTE — ASSESSMENT & PLAN NOTE
On admission, Hgb 8.9, down from 10 2 days ago  Patient presented with 1 day history of bloody streaks noted on toilet paper after BM. Denies light-headedness, dizziness, nausea, vomiting, shortness of breath, weakness.  PMH of melena/GI bleed, peptic ulcer  Given history of CAD, recommended threshold for Hgb > 8    Plan  Repeat hemoglobin at 10 PM  Transfuse if < 7.5  Consent obtained and attached to chart in ED  Type and screen completed

## 2024-01-12 LAB
ABO GROUP BLD BPU: NORMAL
ANION GAP SERPL CALCULATED.3IONS-SCNC: 6 MMOL/L
BPU ID: NORMAL
BUN SERPL-MCNC: 44 MG/DL (ref 5–25)
CALCIUM SERPL-MCNC: 8.7 MG/DL (ref 8.4–10.2)
CHLORIDE SERPL-SCNC: 108 MMOL/L (ref 96–108)
CO2 SERPL-SCNC: 27 MMOL/L (ref 21–32)
CREAT SERPL-MCNC: 2.18 MG/DL (ref 0.6–1.3)
CROSSMATCH: NORMAL
ERYTHROCYTE [DISTWIDTH] IN BLOOD BY AUTOMATED COUNT: 15.5 % (ref 11.6–15.1)
GFR SERPL CREATININE-BSD FRML MDRD: 27 ML/MIN/1.73SQ M
GLUCOSE SERPL-MCNC: 127 MG/DL (ref 65–140)
GLUCOSE SERPL-MCNC: 148 MG/DL (ref 65–140)
GLUCOSE SERPL-MCNC: 165 MG/DL (ref 65–140)
GLUCOSE SERPL-MCNC: 195 MG/DL (ref 65–140)
GLUCOSE SERPL-MCNC: 90 MG/DL (ref 65–140)
GLUCOSE SERPL-MCNC: 98 MG/DL (ref 65–140)
HCT VFR BLD AUTO: 24.3 % (ref 36.5–49.3)
HCT VFR BLD AUTO: 26.1 % (ref 36.5–49.3)
HGB BLD-MCNC: 7.8 G/DL (ref 12–17)
HGB BLD-MCNC: 8.4 G/DL (ref 12–17)
MAGNESIUM SERPL-MCNC: 2.3 MG/DL (ref 1.9–2.7)
MCH RBC QN AUTO: 31.3 PG (ref 26.8–34.3)
MCHC RBC AUTO-ENTMCNC: 32.2 G/DL (ref 31.4–37.4)
MCV RBC AUTO: 97 FL (ref 82–98)
PLATELET # BLD AUTO: 147 THOUSANDS/UL (ref 149–390)
PMV BLD AUTO: 10.3 FL (ref 8.9–12.7)
POTASSIUM SERPL-SCNC: 4 MMOL/L (ref 3.5–5.3)
RBC # BLD AUTO: 2.68 MILLION/UL (ref 3.88–5.62)
SODIUM SERPL-SCNC: 141 MMOL/L (ref 135–147)
UNIT DISPENSE STATUS: NORMAL
UNIT PRODUCT CODE: NORMAL
UNIT PRODUCT VOLUME: 350 ML
UNIT RH: NORMAL
WBC # BLD AUTO: 4.7 THOUSAND/UL (ref 4.31–10.16)

## 2024-01-12 PROCEDURE — 85018 HEMOGLOBIN: CPT

## 2024-01-12 PROCEDURE — 82948 REAGENT STRIP/BLOOD GLUCOSE: CPT

## 2024-01-12 PROCEDURE — 83735 ASSAY OF MAGNESIUM: CPT

## 2024-01-12 PROCEDURE — 85027 COMPLETE CBC AUTOMATED: CPT

## 2024-01-12 PROCEDURE — 99232 SBSQ HOSP IP/OBS MODERATE 35: CPT | Performed by: HOSPITALIST

## 2024-01-12 PROCEDURE — C9113 INJ PANTOPRAZOLE SODIUM, VIA: HCPCS

## 2024-01-12 PROCEDURE — 85014 HEMATOCRIT: CPT

## 2024-01-12 PROCEDURE — 99232 SBSQ HOSP IP/OBS MODERATE 35: CPT | Performed by: INTERNAL MEDICINE

## 2024-01-12 PROCEDURE — 80048 BASIC METABOLIC PNL TOTAL CA: CPT | Performed by: HOSPITALIST

## 2024-01-12 RX ORDER — HYDRALAZINE HYDROCHLORIDE 20 MG/ML
5 INJECTION INTRAMUSCULAR; INTRAVENOUS EVERY 6 HOURS PRN
Status: DISCONTINUED | OUTPATIENT
Start: 2024-01-12 | End: 2024-01-13 | Stop reason: HOSPADM

## 2024-01-12 RX ADMIN — IRON SUCROSE 200 MG: 20 INJECTION, SOLUTION INTRAVENOUS at 10:55

## 2024-01-12 RX ADMIN — PANCRELIPASE 24000 UNITS: 24000; 76000; 120000 CAPSULE, DELAYED RELEASE PELLETS ORAL at 16:50

## 2024-01-12 RX ADMIN — METOPROLOL SUCCINATE 12.5 MG: 25 TABLET, EXTENDED RELEASE ORAL at 08:33

## 2024-01-12 RX ADMIN — PANTOPRAZOLE SODIUM 40 MG: 40 INJECTION, POWDER, FOR SOLUTION INTRAVENOUS at 21:57

## 2024-01-12 RX ADMIN — ALLOPURINOL 300 MG: 300 TABLET ORAL at 08:33

## 2024-01-12 RX ADMIN — INSULIN LISPRO 1 UNITS: 100 INJECTION, SOLUTION INTRAVENOUS; SUBCUTANEOUS at 00:51

## 2024-01-12 RX ADMIN — ATORVASTATIN CALCIUM 80 MG: 40 TABLET, FILM COATED ORAL at 21:57

## 2024-01-12 RX ADMIN — INSULIN LISPRO 1 UNITS: 100 INJECTION, SOLUTION INTRAVENOUS; SUBCUTANEOUS at 17:42

## 2024-01-12 RX ADMIN — AMLODIPINE BESYLATE 10 MG: 10 TABLET ORAL at 08:33

## 2024-01-12 RX ADMIN — CALCITRIOL 0.25 MCG: 0.25 CAPSULE, LIQUID FILLED ORAL at 08:33

## 2024-01-12 RX ADMIN — PANTOPRAZOLE SODIUM 40 MG: 40 INJECTION, POWDER, FOR SOLUTION INTRAVENOUS at 08:33

## 2024-01-12 NOTE — ASSESSMENT & PLAN NOTE
Patient with PMH of melena/GI bleed in December 2023 and peptic ulcer presents with 1 day history of rectal bleeding noted on toilet paper (dark red to black) after BM. BM themselves are formed and nonbloody. Reports mild LLQ discomfort for past several days. Earlier on the day of admission, patient had 2 BM without blood on toilet paper. Seen by outpatient GI AP on morning of admission, and was instructed to present to the ED.  Notable hospitalization x2 in December for melena/GI bleed. EGD x2 revealed gastric ulcers, which were cauterized and clipped. However, EGD and colonoscopy during both admissions did not reveal overt sources of GI bleed. Colonoscopy revealed small hemorrhoids.  Denies hematuria, urinary symptoms.   FOBT positive in ED  Etiology: hemorrhoids vs peptic ulcer vs diverticular bleed    Plan  S/p 5 clips via EGD   Diet advanced

## 2024-01-12 NOTE — ASSESSMENT & PLAN NOTE
Lab Results   Component Value Date    EGFR 27 01/12/2024    EGFR 21 01/11/2024    EGFR 18 01/10/2024    CREATININE 2.18 (H) 01/12/2024    CREATININE 2.67 (H) 01/11/2024    CREATININE 2.97 (H) 01/10/2024     Baseline appears to be 2-2.5  Etiology: prerenal in the setting of dehydration/decreased PO intake vs GI bleed    Plan  Monitor off fluids   Hold torsemide  Monitor BMP  Monitor I/Os  Urinary retention protocol

## 2024-01-12 NOTE — ASSESSMENT & PLAN NOTE
Lab Results   Component Value Date    HGBA1C 6.1 (H) 12/09/2023       Recent Labs     01/12/24  0039 01/12/24  0550 01/12/24  1042 01/12/24  1559   POCGLU 165* 98 148* 195*         Blood Sugar Average: Last 72 hrs:  (P) 133.1209462078007113    Home regimen: glimepiride  NPO after midnight  SSI while inpatient  Accuchecks q6h  Hypoglycemia protocol  Diabetic diet with fluid and sodium restriction when resuming diet

## 2024-01-12 NOTE — ASSESSMENT & PLAN NOTE
Wt Readings from Last 3 Encounters:   01/12/24 73.3 kg (161 lb 9.6 oz)   01/10/24 76.5 kg (168 lb 9.6 oz)   12/28/23 75.3 kg (166 lb)     Home regimen: torsemide 20 mg daily, Toprol XL  Previously on lisinopril, but held during last admission for SHOAIB. Not resumed on discharge pending outpatient labs.    Plan  Held torsemide given SHOAIB  NPO after midnight. Consider sodium and fluid restriction when diet resumed  Monitor I/Os and daily standing weights

## 2024-01-12 NOTE — PROGRESS NOTES
Progress Note - Jay Roach Jr. 78 y.o. male MRN: [id number]    Unit/Bed#: S -01 Encounter: [id number]    Reason for Consult / Principal Problem: GI bleed     ASSESSMENT and PLAN:    Principal Problem:    GI bleed  Active Problems:    Hypertension    CAD (coronary artery disease)    T2DM (type 2 diabetes mellitus) (HCC)    GERD (gastroesophageal reflux disease)    Hyperlipidemia    Chronic diastolic CHF (congestive heart failure) (HCC)    Acute kidney injury superimposed on stage 4 chronic kidney disease (HCC)    Chronic pancreatitis (HCC)    Anemia  History of bleeding gastric ulcer  Exocrine Pancreatic Insufficiency  - EGD on Thursday, January 11, 2024 which showed previously placed clips and place.  Multiple small superficial linear ulcers with flat pigmented spot Mikey class IIc, 5 clips placed and bipolar cautery used to achieve hemostasis.    - Hemoglobin currently stable 7.8-8.4.  - follow Hg and transfuse PRN  -Continue IV pantoprazole 40mg BID, and would discharge on pantoprazole 40 mg twice daily  -Would repeat EGD in 8 to 12 weeks  -Continue to hold aspirin and Plavix at this time.   If Dual Anti-Platelet Therapy (DAPT) is medically necessary in the future, then OK with GI, but benefit vs risk should be considered.  -Can advance to a soft diet    ----------------------------------------------------------------------------------------------------------------    Subjective:   79 y/o white male with PMH of melena/GI bleeding in December 2023, GERD, CKD (3-4), peptic ulcer, HTN, CAD s/p PCI, AS s/p TAVR on aspirin and Plavix (last dose per patient on Weds 1/10/24), HFpEF, T2DM, right hemicolectomy s/p perforated appendicitis , exocrine pancreatic insufficiency who presents for evaluation of 1 day history of bleeding per rectum noted on toilet paper after BM and mild LLQ discomfort. Describes color of blood as dark red-black. The bowel movements themselves are nonbloody and formed.      Patient  was in the hospital December 4th, 2023 for hematochezia and had EGD and Colonoscopy.  EGD did reveal benign appearing linear ulcer with pigmented spot in stomach [place] IIC s/p cautery and clip.   Colonoscopy unrevealing.  Was then re-admitted 12/23/23 for melena.   EGD 12/23/23 with fresh clot in stomach and greater curvature with epinephrine applied and 4 clips.      Interval history: Patient underwent EGD on Thursday, January 11, 2024 which showed previously placed clips and place.  Multiple small superficial linear ulcers with flat pigmented spot Mikey class IIc, 5 clips placed and bipolar cautery used to achieve hemostasis.  Hemoglobin currently stable 7.8-8.4.    Patient denies any abdominal pain, nausea/vomiting, pain or difficulty swallowing, change in bowels, black or bloody stools        Objective:     Vitals: Blood pressure 137/84, pulse 57, temperature 98.8 °F (37.1 °C), resp. rate 18, weight 73.3 kg (161 lb 9.6 oz), SpO2 97%.,Body mass index is 23.19 kg/m².      Intake/Output Summary (Last 24 hours) at 1/12/2024 0840  Last data filed at 1/12/2024 0054  Gross per 24 hour   Intake 450 ml   Output 150 ml   Net 300 ml       Physical Exam:     General Appearance: Alert, appears stated age and cooperative  HEENT: NCAT, sclera anicteric  Lungs: Clear to auscultation bilaterally, no rales or rhonchi, no labored breathing/accessory muscle use  Heart: Regular rate and rhythm, S1, S2 normal, no murmur, click, rub or gallop  Abdomen: Soft, non-tender, non-distended; bowel sounds normal; no masses or no organomegaly  Extremities: No cyanosis, clubbing, or edema  Neuro: Alert and oriented x 3  Psych: Normal behavior    Invasive Devices       Peripheral Intravenous Line  Duration             Peripheral IV 01/10/24 Left Antecubital 1 day    Peripheral IV 01/11/24 Distal;Dorsal (posterior);Left Forearm 1 day                    Lab Results:  Results from last 7 days   Lab Units 01/12/24  4978 01/11/24  5854  "01/10/24  1548   WBC Thousand/uL 4.70   < > 6.05   HEMOGLOBIN g/dL 8.4*   < > 8.9*   HEMATOCRIT % 26.1*   < > 27.3*   PLATELETS Thousands/uL 147*   < > 179   NEUTROS PCT %  --   --  70   LYMPHS PCT %  --   --  19   MONOS PCT %  --   --  7   EOS PCT %  --   --  3    < > = values in this interval not displayed.     Results from last 7 days   Lab Units 01/12/24  0545 01/11/24  0409 01/10/24  1548   POTASSIUM mmol/L 4.0   < > 4.1   CHLORIDE mmol/L 108   < > 105   CO2 mmol/L 27   < > 27   BUN mg/dL 44*   < > 55*   CREATININE mg/dL 2.18*   < > 2.97*   CALCIUM mg/dL 8.7   < > 9.1   ALK PHOS U/L  --   --  61   ALT U/L  --   --  5*   AST U/L  --   --  10*    < > = values in this interval not displayed.     Invalid input(s): \"BILI\"      Results from last 7 days   Lab Units 01/10/24  1548   LIPASE u/L <6*       Imaging Studies: I have personally reviewed pertinent imaging studies.    EGD    Addendum Date: 1/11/2024    [facility] [address] [facility] [address] [phone] DATE OF SERVICE: 1/11/24 PHYSICIAN(S): Attending: Karl Siddiqui MD Fellow: No Staff Documented INDICATION: Acute blood loss anemia, Gastrointestinal hemorrhage associated with gastric ulcer POST-OP DIAGNOSIS: See the impression below. PREPROCEDURE: Informed consent was obtained for the procedure, including sedation.  Risks of perforation, hemorrhage, adverse drug reaction and aspiration were discussed. The patient was placed in the left lateral decubitus position. Patient was explained about the risks and benefits of the procedure. Risks including but not limited to bleeding, infection, and perforation were explained in detail. Also explained about less than 100% sensitivity with the exam and other alternatives. PROCEDURE: EGD DETAILS OF PROCEDURE: Patient was taken to the procedure room where a time out was performed to confirm correct patient and correct procedure. The patient underwent monitored anesthesia care, " which was administered by an anesthesia professional. The patient's blood pressure, heart rate, level of consciousness, respirations and oxygen were monitored throughout the procedure. The scope was advanced to the second part of the duodenum. Retroflexion was performed in the fundus. The patient experienced no blood loss. The procedure was not difficult. The patient tolerated the procedure well. There were no apparent adverse events. ANESTHESIA INFORMATION: ASA: IV Anesthesia Type: Anesthesia type not filed in the log. MEDICATIONS: No administrations occurring from 1356 to 1414 on 01/11/24 FINDINGS: Regular Z-line 40 cm from the incisors Previously placed clips were seen in the fundus, there were  4 clip still in place.  No active/recent bleeding noted from prior ulcer site. Multiple small, superficial, linear ulcers in the greater curve of the stomach with flat pigmented spot (Mikey IIC); placed 5 clips successfully (clips are MRI conditional); hemostasis achieved; induced coagulation and hemostasis achieved with 3 applications of with bipolar cautery The duodenal bulb, 1st part of the duodenum and 2nd part of the duodenum appeared normal. SPECIMENS: * No specimens in log * IMPRESSION: Multiple ulcers in the greater curve of the stomach with flat pigmented spot ([place]key IIC); placed 5 clips successfully; hemostasis achieved; induced coagulation and hemostasis achieved with bipolar cautery The duodenal bulb, 1st part of the duodenum and 2nd part of the duodenum appeared normal. RECOMMENDATION:  There is no recommended follow-up for this procedure. Recommend IV Protonix 40 mg twice daily. CLD for now. Hold anticoagulation if possible. Discuss with cardiology regarding need for dual antiplatelet therapy given recurrent GI bleeds. Monitor H&H and transfuse if needed.   Karl Siddiqui MD     Result Date: 1/11/2024  Impression: Multiple ulcers in the greater curve of the stomach with flat pigmented spot (Mikey  IIC); placed 5 clips successfully; hemostasis achieved; induced coagulation and hemostasis achieved with bipolar cautery The duodenal bulb, 1st part of the duodenum and 2nd part of the duodenum appeared normal. RECOMMENDATION:  There is no recommended follow-up for this procedure. Recommend IV Protonix 40 mg twice daily. Keep patient NPO. Hold anticoagulation if possible. Discuss with cardiology regarding need for dual antiplatelet therapy given recurrent GI bleeds. Monitor H&H and transfuse if needed.   MD Andres Madera PA-C  Gastroenterology

## 2024-01-12 NOTE — PROGRESS NOTES
[facility]  Progress Note  Name: Jay Harrison I  MRN: [id number]  Unit/Bed#: S -01 I Date of Admission: 1/10/2024   Date of Service: 1/12/2024 I Hospital Day: 1    Assessment/Plan   * GI bleed  Assessment & Plan  Patient with PMH of melena/GI bleed in December 2023 and peptic ulcer presents with 1 day history of rectal bleeding noted on toilet paper (dark red to black) after BM. BM themselves are formed and nonbloody. Reports mild LLQ discomfort for past several days. Earlier on the day of admission, patient had 2 BM without blood on toilet paper. Seen by outpatient GI AP on morning of admission, and was instructed to present to the ED.  Notable hospitalization x2 in December for melena/GI bleed. EGD x2 revealed gastric ulcers, which were cauterized and clipped. However, EGD and colonoscopy during both admissions did not reveal overt sources of GI bleed. Colonoscopy revealed small hemorrhoids.  Denies hematuria, urinary symptoms.   FOBT positive in ED  Etiology: hemorrhoids vs peptic ulcer vs diverticular bleed    Plan  S/p 5 clips via EGD   Diet advanced    Anemia  Assessment & Plan  On admission, Hgb 8.9, down from 10 2 days ago  Patient presented with 1 day history of bloody streaks noted on toilet paper after BM. Denies light-headedness, dizziness, nausea, vomiting, shortness of breath, weakness.  PMH of melena/GI bleed, peptic ulcer  Given history of CAD, recommended threshold for Hgb > 8      Chronic pancreatitis (HCC)  Assessment & Plan  Presents with mild LLQ discomfort. Abdominal exam unremarkable.  Lipase < 6  Takes Creon 3 times daily with meals  12/22/2023 CT abdomen (previous admission) - Sequela of chronic pancreatitis. A 2 cm hyperdense structure in the head of the pancreas is indeterminate but in retrospect is stable since at least 2021. Hematoma vs thrombosed pseudoaneurysm vs hyperdense cystic lesion     Plan  Follow-up in 6-12 months with MRI    Acute  kidney injury superimposed on stage 4 chronic kidney disease ([facility])  Assessment & Plan  Lab Results   Component Value Date    EGFR 27 01/12/2024    EGFR 21 01/11/2024    EGFR 18 01/10/2024    CREATININE 2.18 (H) 01/12/2024    CREATININE 2.67 (H) 01/11/2024    CREATININE 2.97 (H) 01/10/2024     Baseline appears to be 2-2.5  Etiology: prerenal in the setting of dehydration/decreased PO intake vs GI bleed    Plan  Monitor off fluids   Hold torsemide  Monitor BMP  Monitor I/Os  Urinary retention protocol    Chronic diastolic CHF (congestive heart failure) ([facility])  Assessment & Plan  Wt Readings from Last 3 Encounters:   01/12/24 73.3 kg (161 lb 9.6 oz)   01/10/24 76.5 kg (168 lb 9.6 oz)   12/28/23 75.3 kg (166 lb)     Home regimen: torsemide 20 mg daily, Toprol XL  Previously on lisinopril, but held during last admission for SHOAIB. Not resumed on discharge pending outpatient labs.    Plan  Held torsemide given SHOAIB  NPO after midnight. Consider sodium and fluid restriction when diet resumed  Monitor I/Os and daily standing weights        Hyperlipidemia  Assessment & Plan  Continue home atorvastatin 80 mg daily    GERD (gastroesophageal reflux disease)  Assessment & Plan  Replaced PO pantoprazole 40 mg BID with IV    T2DM (type 2 diabetes mellitus) ([facility])  Assessment & Plan  Lab Results   Component Value Date    HGBA1C 6.1 (H) 12/09/2023       Recent Labs     01/12/24  0039 01/12/24  0550 01/12/24  1042 01/12/24  1559   POCGLU 165* 98 148* 195*         Blood Sugar Average: Last 72 hrs:  (P) 133.7350274936978451    Home regimen: glimepiride  NPO after midnight  SSI while inpatient  Accuchecks q6h  Hypoglycemia protocol  Diabetic diet with fluid and sodium restriction when resuming diet      CAD (coronary artery disease)  Assessment & Plan  PMH of CAD s/p CABG with PCI of RCA 5/22, aortic stenosis s/p TAVR 6/22   Home regimen: aspirin, Plavix, Toprol XL, atorvastatin    Plan  Held aspirin, Plavix in the setting of GI  bleed  Consider if DAPT is really required, or whether patient can continue with monotherapy    Hypertension  Assessment & Plan  Home regimen: amlodipine 10 mg daily, Toprol XL 12.5 mg daily  Lisinopril held during previous admission due to SHOAIB. Not resumed on discharge pending repeat labs as outpatient.    Plan  Continue amlodipine, Toprol XL           VTE Pharmacologic Prophylaxis: VTE Score: 4 Moderate Risk (Score 3-4) - Pharmacological DVT Prophylaxis Contraindicated. Sequential Compression Devices Ordered.    Mobility:   Basic Mobility Inpatient Raw Score: 24  JH-HLM Goal: 8: Walk 250 feet or more  JH-HLM Achieved: 8: Walk 250 feet ot more       Patient Centered Rounds: I performed bedside rounds with nursing staff today.  Discussions with Specialists or Other Care Team Provider: GI    Education and Discussions with Family / Patient: Patient declined call to .     Current Length of Stay: 1 day(s)  Current Patient Status: Inpatient   Discharge Plan: Anticipate discharge in 24-48 hrs to home.    Code Status: Level 3 - DNAR and DNI    Subjective:   Pt with melenic bm overnight. However feeling well. Denies chest pain, sob, dizziness.     Objective:     Vitals:   Temp (24hrs), Av.9 °F (36.6 °C), Min:97.4 °F (36.3 °C), Max:98.8 °F (37.1 °C)    Temp:  [97.4 °F (36.3 °C)-98.8 °F (37.1 °C)] 97.7 °F (36.5 °C)  HR:  [52-58] 52  Resp:  [17-18] 18  BP: (135-158)/(38-84) 140/44  SpO2:  [97 %-99 %] 99 %  Body mass index is 23.19 kg/m².     Input and Output Summary (last 24 hours):     Intake/Output Summary (Last 24 hours) at 2024 1736  Last data filed at 2024 0054  Gross per 24 hour   Intake --   Output 150 ml   Net -150 ml       Physical Exam:   Physical Exam  Constitutional:       Appearance: He is not ill-appearing.   HENT:      Head: Normocephalic and atraumatic.      Mouth/Throat:      Mouth: Mucous membranes are dry.   Eyes:      Extraocular Movements: Extraocular movements intact.       Pupils: Pupils are equal, round, and reactive to light.      Comments: Pale conjunctivae   Cardiovascular:      Rate and Rhythm: Normal rate and regular rhythm.      Pulses: Normal pulses.      Heart sounds: Murmur heard.   Abdominal:      General: Abdomen is flat. Bowel sounds are normal. There is no distension.      Palpations: Abdomen is soft.      Tenderness: There is no abdominal tenderness. There is no right CVA tenderness, left CVA tenderness or guarding.   Musculoskeletal:         General: Normal range of motion.      Right lower leg: No edema.      Left lower leg: No edema.   Skin:     General: Skin is warm and dry.   Neurological:      General: No focal deficit present.      Mental Status: He is alert and oriented to person, place, and time.      Motor: No weakness.         Additional Data:     Labs:  Results from last 7 days   Lab Units 01/12/24  0545 01/11/24  0409 01/10/24  1548   WBC Thousand/uL 4.70   < > 6.05   HEMOGLOBIN g/dL 8.4*   < > 8.9*   HEMATOCRIT % 26.1*   < > 27.3*   PLATELETS Thousands/uL 147*   < > 179   NEUTROS PCT %  --   --  70   LYMPHS PCT %  --   --  19   MONOS PCT %  --   --  7   EOS PCT %  --   --  3    < > = values in this interval not displayed.     Results from last 7 days   Lab Units 01/12/24  0545 01/11/24  0409 01/10/24  1548   SODIUM mmol/L 141   < > 140   POTASSIUM mmol/L 4.0   < > 4.1   CHLORIDE mmol/L 108   < > 105   CO2 mmol/L 27   < > 27   BUN mg/dL 44*   < > 55*   CREATININE mg/dL 2.18*   < > 2.97*   ANION GAP mmol/L 6   < > 8   CALCIUM mg/dL 8.7   < > 9.1   ALBUMIN g/dL  --   --  3.8   TOTAL BILIRUBIN mg/dL  --   --  0.34   ALK PHOS U/L  --   --  61   ALT U/L  --   --  5*   AST U/L  --   --  10*   GLUCOSE RANDOM mg/dL 90   < > 109    < > = values in this interval not displayed.         Results from last 7 days   Lab Units 01/12/24  1559 01/12/24  1042 01/12/24  0550 01/12/24  0039 01/11/24  2255 01/11/24  1911 01/11/24  1113 01/11/24  0745 01/11/24  0137  01/10/24  2039 01/10/24  1629   POC GLUCOSE mg/dl 195* 148* 98 165* 116 133 125 121 117 112 134               Lines/Drains:  Invasive Devices       Peripheral Intravenous Line  Duration             Peripheral IV 01/10/24 Left Antecubital 2 days    Peripheral IV 01/11/24 Distal;Dorsal (posterior);Left Forearm 1 day                          Imaging: No pertinent imaging reviewed.    Recent Cultures (last 7 days):         Last 24 Hours Medication List:   Current Facility-Administered Medications   Medication Dose Route Frequency Provider Last Rate    acetaminophen  650 mg Oral Q6H PRN Tierra Tom MD      allopurinol  300 mg Oral Daily Tierra Tom MD      amLODIPine  10 mg Oral Daily Tierra Tom MD      atorvastatin  80 mg Oral HS Tierra Tom MD      calcitriol  0.25 mcg Oral Daily Tierra Tom MD      hydrALAZINE  5 mg Intravenous Q6H PRN Debby Merritt MD      insulin lispro  1-5 Units Subcutaneous Q6H [facility] Tierra Tom MD      insulin lispro  1-5 Units Subcutaneous HS Tierra Tom MD      iron sucrose  200 mg Intravenous Daily Kathy Lisa MD      metoprolol succinate  12.5 mg Oral Daily Tierra Tom MD      nicotine  1 patch Transdermal Daily Tierra Tom MD      pancrelipase (Lip-Prot-Amyl)  24,000 Units Oral TID With Meals Trevor Younger MD      pantoprazole  40 mg Intravenous Q12H [facility] Tierra Tom MD          Today, Patient Was Seen By: Trevor Younger MD    **Please Note: This note may have been constructed using a voice recognition system.**

## 2024-01-12 NOTE — ASSESSMENT & PLAN NOTE
On admission, Hgb 8.9, down from 10 2 days ago  Patient presented with 1 day history of bloody streaks noted on toilet paper after BM. Denies light-headedness, dizziness, nausea, vomiting, shortness of breath, weakness.  PMH of melena/GI bleed, peptic ulcer  Given history of CAD, recommended threshold for Hgb > 8

## 2024-01-13 VITALS
RESPIRATION RATE: 18 BRPM | HEART RATE: 51 BPM | TEMPERATURE: 97.5 F | BODY MASS INDEX: 22.97 KG/M2 | SYSTOLIC BLOOD PRESSURE: 147 MMHG | DIASTOLIC BLOOD PRESSURE: 42 MMHG | WEIGHT: 160.05 LBS | OXYGEN SATURATION: 98 %

## 2024-01-13 LAB
ANION GAP SERPL CALCULATED.3IONS-SCNC: 5 MMOL/L
BUN SERPL-MCNC: 41 MG/DL (ref 5–25)
CALCIUM SERPL-MCNC: 8.6 MG/DL (ref 8.4–10.2)
CHLORIDE SERPL-SCNC: 109 MMOL/L (ref 96–108)
CO2 SERPL-SCNC: 26 MMOL/L (ref 21–32)
CREAT SERPL-MCNC: 2.07 MG/DL (ref 0.6–1.3)
ERYTHROCYTE [DISTWIDTH] IN BLOOD BY AUTOMATED COUNT: 15.2 % (ref 11.6–15.1)
GFR SERPL CREATININE-BSD FRML MDRD: 29 ML/MIN/1.73SQ M
GLUCOSE SERPL-MCNC: 122 MG/DL (ref 65–140)
GLUCOSE SERPL-MCNC: 138 MG/DL (ref 65–140)
GLUCOSE SERPL-MCNC: 150 MG/DL (ref 65–140)
GLUCOSE SERPL-MCNC: 91 MG/DL (ref 65–140)
HCT VFR BLD AUTO: 24.4 % (ref 36.5–49.3)
HGB BLD-MCNC: 8 G/DL (ref 12–17)
MCH RBC QN AUTO: 31.6 PG (ref 26.8–34.3)
MCHC RBC AUTO-ENTMCNC: 32.8 G/DL (ref 31.4–37.4)
MCV RBC AUTO: 96 FL (ref 82–98)
PLATELET # BLD AUTO: 140 THOUSANDS/UL (ref 149–390)
PMV BLD AUTO: 10.4 FL (ref 8.9–12.7)
POTASSIUM SERPL-SCNC: 3.9 MMOL/L (ref 3.5–5.3)
RBC # BLD AUTO: 2.53 MILLION/UL (ref 3.88–5.62)
SODIUM SERPL-SCNC: 140 MMOL/L (ref 135–147)
WBC # BLD AUTO: 4.15 THOUSAND/UL (ref 4.31–10.16)

## 2024-01-13 PROCEDURE — 80048 BASIC METABOLIC PNL TOTAL CA: CPT | Performed by: HOSPITALIST

## 2024-01-13 PROCEDURE — 82948 REAGENT STRIP/BLOOD GLUCOSE: CPT

## 2024-01-13 PROCEDURE — C9113 INJ PANTOPRAZOLE SODIUM, VIA: HCPCS

## 2024-01-13 PROCEDURE — 99239 HOSP IP/OBS DSCHRG MGMT >30: CPT | Performed by: HOSPITALIST

## 2024-01-13 PROCEDURE — 85027 COMPLETE CBC AUTOMATED: CPT

## 2024-01-13 RX ADMIN — CALCITRIOL 0.25 MCG: 0.25 CAPSULE, LIQUID FILLED ORAL at 09:24

## 2024-01-13 RX ADMIN — PANTOPRAZOLE SODIUM 40 MG: 40 INJECTION, POWDER, FOR SOLUTION INTRAVENOUS at 09:25

## 2024-01-13 RX ADMIN — INSULIN LISPRO 1 UNITS: 100 INJECTION, SOLUTION INTRAVENOUS; SUBCUTANEOUS at 07:59

## 2024-01-13 RX ADMIN — ALLOPURINOL 300 MG: 300 TABLET ORAL at 09:26

## 2024-01-13 RX ADMIN — IRON SUCROSE 200 MG: 20 INJECTION, SOLUTION INTRAVENOUS at 09:26

## 2024-01-13 RX ADMIN — PANCRELIPASE 24000 UNITS: 24000; 76000; 120000 CAPSULE, DELAYED RELEASE PELLETS ORAL at 09:25

## 2024-01-13 RX ADMIN — AMLODIPINE BESYLATE 10 MG: 10 TABLET ORAL at 09:26

## 2024-01-13 NOTE — ASSESSMENT & PLAN NOTE
Lab Results   Component Value Date    HGBA1C 6.1 (H) 12/09/2023       Recent Labs     01/12/24  1559 01/12/24  2104 01/13/24  0007 01/13/24  0627   POCGLU 195* 127 138 150*         Blood Sugar Average: Last 72 hrs:  (P) 134.9655314631229193    Home regimen: glimepiride  NPO after midnight  SSI while inpatient  Accuchecks q6h  Hypoglycemia protocol  Diabetic diet with fluid and sodium restriction when resuming diet

## 2024-01-13 NOTE — DISCHARGE SUMMARY
[facility]  Discharge- Jay Roach Jr. 1945, 78 y.o. male MRN: [id number]  Unit/Bed#: S -01 Encounter: [id number]  Primary Care Provider: Simón Hadley MD   Date and time admitted to hospital: 1/10/2024  3:54 PM    * GI bleed  Assessment & Plan  Patient with PMH of melena/GI bleed in December 2023 and peptic ulcer presents with 1 day history of rectal bleeding noted on toilet paper (dark red to black) after BM. BM themselves are formed and nonbloody. Reports mild LLQ discomfort for past several days. Earlier on the day of admission, patient had 2 BM without blood on toilet paper. Seen by outpatient GI AP on morning of admission, and was instructed to present to the ED.  Notable hospitalization x2 in December for melena/GI bleed. EGD x2 revealed gastric ulcers, which were cauterized and clipped. However, EGD and colonoscopy during both admissions did not reveal overt sources of GI bleed. Colonoscopy revealed small hemorrhoids.  Denies hematuria, urinary symptoms.   FOBT positive in ED  Etiology: hemorrhoids vs peptic ulcer vs diverticular bleed    Plan  S/p 5 clips via EGD   Diet advanced  No episodes of melena tonight.     Anemia  Assessment & Plan  On admission, Hgb 8.9, down from 10 2 days ago  Patient presented with 1 day history of bloody streaks noted on toilet paper after BM. Denies light-headedness, dizziness, nausea, vomiting, shortness of breath, weakness.  PMH of melena/GI bleed, peptic ulcer  Given history of CAD, recommended threshold for Hgb > 8      Chronic pancreatitis (HCC)  Assessment & Plan  Presents with mild LLQ discomfort. Abdominal exam unremarkable.  Lipase < 6  Takes Creon 3 times daily with meals  12/22/2023 CT abdomen (previous admission) - Sequela of chronic pancreatitis. A 2 cm hyperdense structure in the head of the pancreas is indeterminate but in retrospect is stable since at least 2021. Hematoma vs thrombosed pseudoaneurysm vs hyperdense  cystic lesion     Plan  Follow-up in 6-12 months with MRI    Acute kidney injury superimposed on stage 4 chronic kidney disease ([facility])  Assessment & Plan  Lab Results   Component Value Date    EGFR 29 01/13/2024    EGFR 27 01/12/2024    EGFR 21 01/11/2024    CREATININE 2.07 (H) 01/13/2024    CREATININE 2.18 (H) 01/12/2024    CREATININE 2.67 (H) 01/11/2024     Baseline appears to be 2-2.5  Etiology: prerenal in the setting of dehydration/decreased PO intake vs GI bleed    Plan  DC     Chronic diastolic CHF (congestive heart failure) ([facility])  Assessment & Plan  Wt Readings from Last 3 Encounters:   01/13/24 72.6 kg (160 lb 0.9 oz)   01/10/24 76.5 kg (168 lb 9.6 oz)   12/28/23 75.3 kg (166 lb)     Home regimen: torsemide 20 mg daily, Toprol XL  Previously on lisinopril, but held during last admission for SHOAIB. Not resumed on discharge pending outpatient labs.    Plan  Held torsemide given SHOAIB  NPO after midnight. Consider sodium and fluid restriction when diet resumed  Monitor I/Os and daily standing weights        Hyperlipidemia  Assessment & Plan  Continue home atorvastatin 80 mg daily    GERD (gastroesophageal reflux disease)  Assessment & Plan  Replaced PO pantoprazole 40 mg BID with IV    T2DM (type 2 diabetes mellitus) ([facility])  Assessment & Plan  Lab Results   Component Value Date    HGBA1C 6.1 (H) 12/09/2023       Recent Labs     01/12/24  1559 01/12/24  2104 01/13/24  0007 01/13/24  0627   POCGLU 195* 127 138 150*         Blood Sugar Average: Last 72 hrs:  (P) [id number]    Home regimen: glimepiride  NPO after midnight  SSI while inpatient  Accuchecks q6h  Hypoglycemia protocol  Diabetic diet with fluid and sodium restriction when resuming diet      CAD (coronary artery disease)  Assessment & Plan  PMH of CAD s/p CABG with PCI of RCA 5/22, aortic stenosis s/p TAVR 6/22   Home regimen: aspirin, Plavix, Toprol XL, atorvastatin    Plan  Held aspirin, Plavix in the setting of GI bleed  Consider if DAPT is  really required, or whether patient can continue with monotherapy    Hypertension  Assessment & Plan  Home regimen: amlodipine 10 mg daily, Toprol XL 12.5 mg daily  Lisinopril held during previous admission due to SHOAIB. Not resumed on discharge pending repeat labs as outpatient.    Plan  Continue amlodipine, Toprol XL      Medical Problems       Resolved Problems  Date Reviewed: 1/13/2024   None       Discharging Resident: Trevor Younger MD  Discharging Attending: Edison Meade MD  PCP: Simón Hadley MD  Admission Date:   Admission Orders (From admission, onward)       Ordered        01/11/24 1454  Inpatient Admission  Once            01/10/24 1652  Place in Observation  Once                          Discharge Date: 01/13/24    Consultations During Hospital Stay:  GI     Procedures Performed:   EGD     Significant Findings / Test Results:   Multiple ulcers in the greater curve of the stomach with flat pigmented spot (Mikey IIC); placed 5 clips successfully; hemostasis achieved; induced coagulation and hemostasis achieved with bipolar cautery  The duodenal bulb, 1st part of the duodenum and 2nd part of the duodenum appeared normal.    Incidental Findings:   none     Test Results Pending at Discharge (will require follow up):  none     Outpatient Tests Requested:  none    Complications:  none    Reason for Admission: Melena    Hospital Course:   Jay Roach Jr. is a 78 y.o. male patient who originally presented to the hospital on 1/10/2024 due to melena.  Upon mission patient was found to be anemic and required 1 unit PRBC.  Patient also underwent an EGD requiring 5 clips. Pt's diet was advanced.         Please see above list of diagnoses and related plan for additional information.     Condition at Discharge: stable    Discharge Day Visit / Exam:   Subjective:  NAEON. Pt reports resolved melena, improved appetite. Pt denies chest pain, abd pain, nausea.   Vitals: Blood Pressure: (!) 137/48 (01/13/24  0820)  Pulse: (!) 49 (01/13/24 0927)  Temperature: 97.7 °F (36.5 °C) (01/13/24 0820)  Temp Source: Oral (01/13/24 0820)  Respirations: 18 (01/13/24 0820)  Weight - Scale: 72.6 kg (160 lb 0.9 oz) (01/13/24 0600)  SpO2: 98 % (01/13/24 0820)  Exam:   Physical Exam  Constitutional:       Appearance: He is not ill-appearing.   HENT:      Head: Normocephalic and atraumatic.      Mouth/Throat:      Mouth: Mucous membranes are dry.   Eyes:      Extraocular Movements: Extraocular movements intact.      Pupils: Pupils are equal, round, and reactive to light.      Comments: Pale conjunctivae   Cardiovascular:      Rate and Rhythm: Normal rate and regular rhythm.      Pulses: Normal pulses.      Heart sounds: Murmur heard.   Abdominal:      General: Abdomen is flat. Bowel sounds are normal. There is no distension.      Palpations: Abdomen is soft.      Tenderness: There is no abdominal tenderness. There is no right CVA tenderness, left CVA tenderness or guarding.   Musculoskeletal:         General: Normal range of motion.      Right lower leg: No edema.      Left lower leg: No edema.   Skin:     General: Skin is warm and dry.   Neurological:      General: No focal deficit present.      Mental Status: He is alert and oriented to person, place, and time.      Motor: No weakness.          Discussion with Family: Patient declined call to .     Discharge instructions/Information to patient and family:   See after visit summary for information provided to patient and family.      Provisions for Follow-Up Care:  See after visit summary for information related to follow-up care and any pertinent home health orders.      Mobility at time of Discharge:   Basic Mobility Inpatient Raw Score: 24  JH-HLM Goal: 8: Walk 250 feet or more  JH-HLM Achieved: 8: Walk 250 feet ot more  HLM Goal achieved. Continue to encourage appropriate mobility.     Disposition:   Home    Planned Readmission: none    Discharge Medications:  See after  visit summary for reconciled discharge medications provided to patient and/or family.      **Please Note: This note may have been constructed using a voice recognition system**

## 2024-01-13 NOTE — ASSESSMENT & PLAN NOTE
Wt Readings from Last 3 Encounters:   01/13/24 72.6 kg (160 lb 0.9 oz)   01/10/24 76.5 kg (168 lb 9.6 oz)   12/28/23 75.3 kg (166 lb)     Home regimen: torsemide 20 mg daily, Toprol XL  Previously on lisinopril, but held during last admission for SHOAIB. Not resumed on discharge pending outpatient labs.    Plan  Held torsemide given SHOAIB  NPO after midnight. Consider sodium and fluid restriction when diet resumed  Monitor I/Os and daily standing weights

## 2024-01-13 NOTE — ASSESSMENT & PLAN NOTE
Lab Results   Component Value Date    EGFR 29 01/13/2024    EGFR 27 01/12/2024    EGFR 21 01/11/2024    CREATININE 2.07 (H) 01/13/2024    CREATININE 2.18 (H) 01/12/2024    CREATININE 2.67 (H) 01/11/2024     Baseline appears to be 2-2.5  Etiology: prerenal in the setting of dehydration/decreased PO intake vs GI bleed    Plan  DC

## 2024-01-13 NOTE — DISCHARGE INSTR - AVS FIRST PAGE
Dear Jay Roach Jr.,     It was our pleasure to care for you here at [facility].  It is our hope that we were always able to exceed the expected standards for your care during your stay.  You were hospitalized due to melena.  You were cared for on the 4th floor by Trevor Younger MD under the service of Edison Meade MD with the [facility] who covers for your primary care physician (PCP), Simón Hadley MD, while you were hospitalized.  If you have any questions or concerns related to this hospitalization, you may contact us at .  For follow up as well as any medication refills, we recommend that you follow up with your primary care physician.  A registered nurse will reach out to you by phone within a few days after your discharge to answer any additional questions that you may have after going home.  However, at this time we provide for you here, the most important instructions / recommendations at discharge:     Notable Medication Adjustments -   Please stop taking aspirin   Testing Required after Discharge -   none  Important follow up information -   Please follow-up with your PCP within 2 weeks of discharge  Please follow-up with your GI doctor within 2 weeks of discharge  Other Instructions -   none  Please review this entire after visit summary as additional general instructions including medication list, appointments, activity, diet, any pertinent wound care, and other additional recommendations from your care team that may be provided for you.    Here is the cbc and bmp from today per patient's request      Results from last 7 days   Lab Units 01/13/24  0445 01/11/24  0409 01/10/24  1548   SODIUM mmol/L 140   < > 140   POTASSIUM mmol/L 3.9   < > 4.1   CHLORIDE mmol/L 109*   < > 105   CO2 mmol/L 26   < > 27   ANION GAP mmol/L 5   < > 8   BUN mg/dL 41*   < > 55*   CREATININE mg/dL 2.07*   < > 2.97*   CALCIUM mg/dL 8.6   < > 9.1    ALK PHOS U/L  --   --  61   ALT U/L  --   --  5*   AST U/L  --   --  10*    < > = values in this interval not displayed.     Results from last 7 days   Lab Units 01/13/24  0445 01/12/24  0545 01/12/24  0041 01/11/24  1115 01/11/24  0459 01/11/24  0409 01/10/24  1548   WBC Thousand/uL 4.15* 4.70  --   --   --  4.69 6.05   HEMOGLOBIN g/dL 8.0* 8.4* 7.8* 8.2* 6.8* 6.7* 8.9*   HEMATOCRIT % 24.4* 26.1* 24.3*  --  21.5* 21.6* 27.3*   PLATELETS Thousands/uL 140* 147*  --   --   --  151 179   NEUTROS PCT %  --   --   --   --   --   --  70   LYMPHS PCT %  --   --   --   --   --   --  19   MONOS PCT %  --   --   --   --   --   --  7   EOS PCT %  --   --   --   --   --   --  3           Sincerely,     Trevor Younger MD

## 2024-01-13 NOTE — ASSESSMENT & PLAN NOTE
Patient with PMH of melena/GI bleed in December 2023 and peptic ulcer presents with 1 day history of rectal bleeding noted on toilet paper (dark red to black) after BM. BM themselves are formed and nonbloody. Reports mild LLQ discomfort for past several days. Earlier on the day of admission, patient had 2 BM without blood on toilet paper. Seen by outpatient GI AP on morning of admission, and was instructed to present to the ED.  Notable hospitalization x2 in December for melena/GI bleed. EGD x2 revealed gastric ulcers, which were cauterized and clipped. However, EGD and colonoscopy during both admissions did not reveal overt sources of GI bleed. Colonoscopy revealed small hemorrhoids.  Denies hematuria, urinary symptoms.   FOBT positive in ED  Etiology: hemorrhoids vs peptic ulcer vs diverticular bleed    Plan  S/p 5 clips via EGD   Diet advanced  No episodes of melena tonight.

## 2024-01-15 ENCOUNTER — TELEPHONE (OUTPATIENT)
Age: 79
End: 2024-01-15

## 2024-01-15 ENCOUNTER — TRANSITIONAL CARE MANAGEMENT (OUTPATIENT)
Dept: FAMILY MEDICINE CLINIC | Facility: CLINIC | Age: 79
End: 2024-01-15

## 2024-01-15 DIAGNOSIS — N18.4 CONTROLLED TYPE 2 DIABETES MELLITUS WITH STAGE 4 CHRONIC KIDNEY DISEASE, WITHOUT LONG-TERM CURRENT USE OF INSULIN (HCC): ICD-10-CM

## 2024-01-15 DIAGNOSIS — E11.22 CONTROLLED TYPE 2 DIABETES MELLITUS WITH STAGE 4 CHRONIC KIDNEY DISEASE, WITHOUT LONG-TERM CURRENT USE OF INSULIN (HCC): ICD-10-CM

## 2024-01-15 DIAGNOSIS — K21.9 GASTROESOPHAGEAL REFLUX DISEASE, UNSPECIFIED WHETHER ESOPHAGITIS PRESENT: ICD-10-CM

## 2024-01-15 DIAGNOSIS — K86.81 EXOCRINE PANCREATIC INSUFFICIENCY: ICD-10-CM

## 2024-01-15 NOTE — TELEPHONE ENCOUNTER
[facility] Assessment    Name: Bridgette Soto  YOB: 1945  Last Height: 5' 10" (1 778 m)  Last weight: 78 9 kg (174 lb)  BMI: 24 97 kg/m²  Procedure: EGD  Diagnosis:  Gastroesophageal reflux disease without esophagitis  Date of procedure: 12-  Prep: n/a  Responsible : Yoni Saldana  Phone#: [phone]  Name completing form: Nanci Baird  Date form completed: 11/03/22      If the patient answers yes to any of these questions, schedule in a hospital  Are you pregnant: No  Do you rely on a wheelchair for mobility: No  Have you been diagnosed with End Stage Renal Disease (ESRD):   Do you need oxygen during the day: No  Have you had a heart attack or stroke within the past three months: No  Have you had a seizure within the past three months: No  Have you ever been informed by anesthesia that you have a difficult airway: No  Additional Questions  Have you had any cardiac testing or are under the care of a Cardiologist (see cardiac list): Yes (Comment: Obtain Cardiac Clearance)  Cardiac list:   Do you have an implanted cardiac defibrillator: No (Comment:  This patient should be scheduled in the hospital)    Have any bleeding problems, such as anemia or hemophilia (If patient has H&H result below 8, schedule in hospital   H&H must be within 30 days of procedure): No    Had an organ transplant within the past 3 months: No    Do you have any present infections: No  Do you get short of breath when walking a few blocks: No  Have you been diagnosed with diabetes: No  Comments (provide cardiac provider information if applicable):
Scheduled date of EGD(as of today):12-16-22   Physician performing EGD: Domenico   Location of EGD: [facility]  Instructions reviewed with patient by: CC  Clearances: cardiac
yes

## 2024-01-15 NOTE — TELEPHONE ENCOUNTER
Reason for call: Patient called for another refill.   [x] Refill   [] Prior Auth  [] Other:     Office:   [] PCP/Provider -   [x] Specialty/Provider - Gi / [facility]    Medication: pancrelipase, Lip-Prot-Amyl, (CREON) 24,000 units     Dose/Frequency: Take 24,000 units of lipase by mouth 3 (three) times a day with meals    Quantity: 360    Pharmacy: [facility]     Does the patient have enough for 3 days?   [x] Yes   [] No - Send as HP to POD

## 2024-01-15 NOTE — TELEPHONE ENCOUNTER
FYI: PT was in hospital for 3 days and dicharged. Pt was transferred to [facility] at office and she kindly assisted with TCM appt.

## 2024-01-15 NOTE — TELEPHONE ENCOUNTER
Pt calling in to inform he has a GI bleed and has had several procedures done with both  and  in the hospital. He was discharged on 1/13/24 and was directed to follow up with one of those doctors within 1-2 weeks. Nothing available, please advise.

## 2024-01-15 NOTE — TELEPHONE ENCOUNTER
Reason for call:   [x] Refill   [] Prior Auth  [] Other:     Office:   [x] PCP/Provider -   [] Specialty/Provider -     Medication: glimepiride 1 mg 1 daily    Quantity: 90    Pharmacy: [facility] - [address] [phone]     Does the patient have enough for 3 days?   [x] Yes   [] No - Send as HP to POD

## 2024-01-15 NOTE — TELEPHONE ENCOUNTER
The dosage was increased in the hospital.     Reason for call:   [x] Refill   [] Prior Auth  [] Other:     Office:   [] PCP/Provider -   [x] Specialty/Provider - Gastro    Medication: pantorpazole 40 mg twice a day    Quantity: 180    Pharmacy:   [facility] - [address] [phone]       Does the patient have enough for 3 days?   [x] Yes   [] No - Send as HP to POD

## 2024-01-16 ENCOUNTER — OFFICE VISIT (OUTPATIENT)
Dept: FAMILY MEDICINE CLINIC | Facility: CLINIC | Age: 79
End: 2024-01-16
Payer: MEDICARE

## 2024-01-16 VITALS
SYSTOLIC BLOOD PRESSURE: 146 MMHG | DIASTOLIC BLOOD PRESSURE: 50 MMHG | BODY MASS INDEX: 23.91 KG/M2 | HEART RATE: 60 BPM | OXYGEN SATURATION: 96 % | HEIGHT: 70 IN | WEIGHT: 167 LBS | TEMPERATURE: 97.7 F | RESPIRATION RATE: 18 BRPM

## 2024-01-16 DIAGNOSIS — Z76.89 ENCOUNTER FOR SUPPORT AND COORDINATION OF TRANSITION OF CARE: Primary | ICD-10-CM

## 2024-01-16 DIAGNOSIS — D62 ACUTE BLOOD LOSS ANEMIA: ICD-10-CM

## 2024-01-16 DIAGNOSIS — L03.114 CELLULITIS OF ARM, LEFT: ICD-10-CM

## 2024-01-16 DIAGNOSIS — Z87.19 HISTORY OF GI BLEED: ICD-10-CM

## 2024-01-16 DIAGNOSIS — N18.4 CONTROLLED TYPE 2 DIABETES MELLITUS WITH STAGE 4 CHRONIC KIDNEY DISEASE, WITHOUT LONG-TERM CURRENT USE OF INSULIN (HCC): ICD-10-CM

## 2024-01-16 DIAGNOSIS — M10.9 GOUT OF LEFT FOOT, UNSPECIFIED CAUSE, UNSPECIFIED CHRONICITY: ICD-10-CM

## 2024-01-16 DIAGNOSIS — E11.22 CONTROLLED TYPE 2 DIABETES MELLITUS WITH STAGE 4 CHRONIC KIDNEY DISEASE, WITHOUT LONG-TERM CURRENT USE OF INSULIN (HCC): ICD-10-CM

## 2024-01-16 PROCEDURE — 99496 TRANSJ CARE MGMT HIGH F2F 7D: CPT | Performed by: NURSE PRACTITIONER

## 2024-01-16 RX ORDER — DOXYCYCLINE HYCLATE 100 MG/1
100 CAPSULE ORAL EVERY 12 HOURS SCHEDULED
Qty: 14 CAPSULE | Refills: 0 | Status: SHIPPED | OUTPATIENT
Start: 2024-01-16 | End: 2024-01-16

## 2024-01-16 RX ORDER — DOXYCYCLINE HYCLATE 100 MG/1
100 CAPSULE ORAL EVERY 12 HOURS SCHEDULED
Qty: 14 CAPSULE | Refills: 0 | Status: SHIPPED | OUTPATIENT
Start: 2024-01-16 | End: 2024-01-23

## 2024-01-16 RX ORDER — GLIMEPIRIDE 1 MG/1
1 TABLET ORAL
Qty: 90 TABLET | Refills: 1 | Status: SHIPPED | OUTPATIENT
Start: 2024-01-16 | End: 2024-01-16 | Stop reason: SDUPTHER

## 2024-01-16 RX ORDER — GLIMEPIRIDE 1 MG/1
1 TABLET ORAL
Qty: 90 TABLET | Refills: 1 | Status: SHIPPED | OUTPATIENT
Start: 2024-01-16 | End: 2024-07-14

## 2024-01-16 RX ORDER — ALLOPURINOL 300 MG/1
300 TABLET ORAL DAILY
Qty: 90 TABLET | Refills: 1 | Status: SHIPPED | OUTPATIENT
Start: 2024-01-16

## 2024-01-16 RX ORDER — PANTOPRAZOLE SODIUM 40 MG/1
40 TABLET, DELAYED RELEASE ORAL 2 TIMES DAILY
Qty: 180 TABLET | Refills: 1 | Status: SHIPPED | OUTPATIENT
Start: 2024-01-16

## 2024-01-16 NOTE — TELEPHONE ENCOUNTER
Pt scheduled in [place] on 2/6/24 but would prefer [place] office if something opens up sooner in [place]. Sb

## 2024-01-16 NOTE — TELEPHONE ENCOUNTER
Patient was discharged on 113 and was told to follow-up with either Dr. Siddiqui or Dr. Summers.  Can you see if there is any availability of appointments with Dr. Summers at that time or if he can be double booked to see the patient.  Thank you

## 2024-01-16 NOTE — PROGRESS NOTES
Assessment/Plan:      Diagnoses and all orders for this visit:    Encounter for support and coordination of transition of care    Acute blood loss anemia    History of GI bleed  -     CBC and differential; Standing  -     CBC and differential    Cellulitis of arm, left  -     Discontinue: doxycycline hyclate (VIBRAMYCIN) 100 mg capsule; Take 1 capsule (100 mg total) by mouth every 12 (twelve) hours for 7 days  -     mupirocin (BACTROBAN) 2 % ointment; Apply topically 3 (three) times a day  -     doxycycline hyclate (VIBRAMYCIN) 100 mg capsule; Take 1 capsule (100 mg total) by mouth every 12 (twelve) hours for 7 days        Physical assessment was remarkable as noted.  Patient did have remains at high degree of pallor with extreme weight loss vital signs were stable I did place a standing order for CBC to include HgB in his chart he is able to utilize that at any time. Also appears to have some cellulitis of the L forearm warm tender to touch will treat with topical Bactroban and Oral Doxycycline bid 7 days if that fails to improve.  Other wise no need for PT/OT/HH services at this time. All questions were answered VS reviewed. RTO as needed or at next fu OV.      Subjective:     Patient ID: Jay Roach Jr. is a 78 y.o. male.    Patient is here for transition of care management for recent hospitalization for acute blood loss due to gi bleeding.        Review of Systems   Constitutional:  Positive for fatigue and unexpected weight change.   Gastrointestinal:  Positive for diarrhea.   Skin:  Positive for pallor.         Objective:     Physical Exam  Cardiovascular:      Rate and Rhythm: Normal rate and regular rhythm.      Heart sounds: Normal heart sounds.   Pulmonary:      Effort: Pulmonary effort is normal.      Breath sounds: Normal breath sounds.   Musculoskeletal:      Cervical back: Neck supple.   Skin:     Coloration: Skin is pale.      Findings: Erythema, lesion and rash (LLarm) present.

## 2024-01-17 ENCOUNTER — TELEPHONE (OUTPATIENT)
Dept: NEPHROLOGY | Facility: CLINIC | Age: 79
End: 2024-01-17

## 2024-01-19 ENCOUNTER — TELEPHONE (OUTPATIENT)
Dept: NEPHROLOGY | Facility: CLINIC | Age: 79
End: 2024-01-19

## 2024-01-19 DIAGNOSIS — N18.4 CKD (CHRONIC KIDNEY DISEASE) STAGE 4, GFR 15-29 ML/MIN (HCC): Primary | ICD-10-CM

## 2024-01-19 NOTE — TELEPHONE ENCOUNTER
78-year-old male with a past medical history of PUD, diabetes mellitus type 2, chronic kidney disease stage IV, chronic diastolic CHF, hyperlipidemia, GERD, CAD with prior CABG, aortic stenosis status post TAVR are, hypertension, chronic pancreatitis, anemia who was recently hospitalized with GI bleed.  He had an SHOAIB due to acute blood loss anemia/prerenal.  He was discharged on 1/13/2024.  Jay follows with Dr. Girard.  He was last seen in the nephrology clinic September 2023.  Patient unable to come in for face-to-face visit today and also unable to perform a virtual visit.  The following note was prepared in anticipation of appointment today.  Due to inclement weather patient could not come in for visit.  I was able to phone Jay and speak with him--- called and he did not answer.  Awaiting return call.  I called back several times after the initial call and there was no answer.  At this time left a detailed message that we would not restart lisinopril at this time until we had follow-up labs and some other data regarding blood pressure readings.  He was at his PCP 3 days ago and blood pressure was near 140/50.  He has been off of the lisinopril since hospitalization in December.  Recommend checking BMP along with CBC which was ordered by another provider.  We will call him when we receive results of blood work and discuss further.    Chronic kidney disease, stage IV:  Etiology:  Baseline creatinine creatinine 2.2 to 2.4 mg/dL  Renal imaging: Nephrolithiasis, no hydronephrosis.  Most recently CT without contrast in December-no hydronephrosis.  Nonobstructing calculi noted.    Proteinuria, persistent:  At last office visit in September patient was placed on Farxiga.  Additionally he was on RAAS blockade with lisinopril.  Lisinopril was placed on hold due to SHOAIB in December 2023    Essential hypertension:  Medications: Amlodipine 10 mg daily and Toprol-XL 12.5 mg daily  Lisinopril has been on hold since  hospitalization in December 2023 previously on lisinopril 20 mg daily  Vesely on torsemide 10 mg as needed for weight gain/edema    CKD MBD:  On low phosphorus diet for control of hyperphosphatemia  Secondary hyperparathyroidism of renal origin: On calcitriol 0.25 mcg daily  History of vitamin D insufficiency treated with ergocalciferol    Anemia in CKD:  On iron supplement.  Prior iron saturation 17%    Chronic diastolic CHF:  Prior CABG and prior TAVR  Ejection fraction 60% with diastolic dysfunction on echo  Previously on torsemide 10 mg daily with an extra dose if needed for weight gain    Nephrolithiasis/left renal cyst:   Maintain good hydration  CT without contrast December 2023: Multiple bilateral nonobstructing calculi largest measuring 8 mm in the left lower pole.  No hydronephrosis.    Recent GI bleed:  Peptic ulcer disease.  Hospitalized in December with melena/GI bleed and also in January.  EGD showed no ulcers and underwent clipping x 5.  Dual antiplatelet therapy was adjusted to single antiplatelet therapy given recurrent GI bleed.    Carotid stenosis:   Prior Doppler showing greater than 70% stenosis internal carotid on the right with severe stenosis external carotid artery.  On the left there was greater than 70% stenosis internal carotid artery with had a generous and irregular plaque there was severe stenosis in the external carotid artery on the left also.  There was no significant change when compared to prior study.    PAD:   Status post right Fem-endarterectomy, right SFA, popliteal and AT angioplasty March 2023.  Follows with vascular    Chronic pancreatitis: On Creon    Diabetes mellitus type 2: A1c 6.1% as of December 2023.  On glimepiride    Hyperlipidemia on atorvastatin    GERD on pantoprazole 40 mg twice daily    CAD with prior CABG  Aortic stenosis status post TAVR June 2022    Advance care planning: Has living will

## 2024-01-19 NOTE — TELEPHONE ENCOUNTER
Pt called office to reschedule appointment due to weather and unable to do virtual visit. I sent TT to Nelida pt would like to discuss concerns due to pt being in hsp 3x with GI bleed and pt being taken off of Asprin and lisinopril.

## 2024-01-19 NOTE — TELEPHONE ENCOUNTER
Pt returning Nelida's call, confirmed that received message and agreeable to go for repeat labs next week. Pt will also monitor BP at home and will send readings to office.

## 2024-01-23 ENCOUNTER — APPOINTMENT (OUTPATIENT)
Dept: LAB | Facility: MEDICAL CENTER | Age: 79
End: 2024-01-23
Payer: MEDICARE

## 2024-01-23 DIAGNOSIS — N18.4 CKD (CHRONIC KIDNEY DISEASE) STAGE 4, GFR 15-29 ML/MIN (HCC): ICD-10-CM

## 2024-01-23 LAB
ANION GAP SERPL CALCULATED.3IONS-SCNC: 11 MMOL/L
BASOPHILS # BLD AUTO: 0.02 THOUSANDS/ÂΜL (ref 0–0.1)
BASOPHILS NFR BLD AUTO: 0 % (ref 0–1)
BUN SERPL-MCNC: 53 MG/DL (ref 5–25)
CALCIUM SERPL-MCNC: 9.2 MG/DL (ref 8.4–10.2)
CHLORIDE SERPL-SCNC: 105 MMOL/L (ref 96–108)
CO2 SERPL-SCNC: 25 MMOL/L (ref 21–32)
CREAT SERPL-MCNC: 2.62 MG/DL (ref 0.6–1.3)
EOSINOPHIL # BLD AUTO: 0.28 THOUSAND/ÂΜL (ref 0–0.61)
EOSINOPHIL NFR BLD AUTO: 5 % (ref 0–6)
ERYTHROCYTE [DISTWIDTH] IN BLOOD BY AUTOMATED COUNT: 15.1 % (ref 11.6–15.1)
GFR SERPL CREATININE-BSD FRML MDRD: 22 ML/MIN/1.73SQ M
GLUCOSE SERPL-MCNC: 120 MG/DL (ref 65–140)
HCT VFR BLD AUTO: 31.2 % (ref 36.5–49.3)
HGB BLD-MCNC: 9.8 G/DL (ref 12–17)
IMM GRANULOCYTES # BLD AUTO: 0.03 THOUSAND/UL (ref 0–0.2)
IMM GRANULOCYTES NFR BLD AUTO: 1 % (ref 0–2)
LYMPHOCYTES # BLD AUTO: 1.12 THOUSANDS/ÂΜL (ref 0.6–4.47)
LYMPHOCYTES NFR BLD AUTO: 18 % (ref 14–44)
MCH RBC QN AUTO: 31 PG (ref 26.8–34.3)
MCHC RBC AUTO-ENTMCNC: 31.4 G/DL (ref 31.4–37.4)
MCV RBC AUTO: 99 FL (ref 82–98)
MONOCYTES # BLD AUTO: 0.47 THOUSAND/ÂΜL (ref 0.17–1.22)
MONOCYTES NFR BLD AUTO: 8 % (ref 4–12)
NEUTROPHILS # BLD AUTO: 4.28 THOUSANDS/ÂΜL (ref 1.85–7.62)
NEUTS SEG NFR BLD AUTO: 68 % (ref 43–75)
NRBC BLD AUTO-RTO: 0 /100 WBCS
PLATELET # BLD AUTO: 198 THOUSANDS/UL (ref 149–390)
PMV BLD AUTO: 10.9 FL (ref 8.9–12.7)
POTASSIUM SERPL-SCNC: 4 MMOL/L (ref 3.5–5.3)
RBC # BLD AUTO: 3.16 MILLION/UL (ref 3.88–5.62)
SODIUM SERPL-SCNC: 141 MMOL/L (ref 135–147)
WBC # BLD AUTO: 6.2 THOUSAND/UL (ref 4.31–10.16)

## 2024-01-23 PROCEDURE — 36415 COLL VENOUS BLD VENIPUNCTURE: CPT

## 2024-01-23 PROCEDURE — 85025 COMPLETE CBC W/AUTO DIFF WBC: CPT | Performed by: NURSE PRACTITIONER

## 2024-01-23 PROCEDURE — 80048 BASIC METABOLIC PNL TOTAL CA: CPT

## 2024-02-01 LAB
LEFT EYE DIABETIC RETINOPATHY: NORMAL
RIGHT EYE DIABETIC RETINOPATHY: NORMAL
SEVERITY (EYE EXAM): NORMAL

## 2024-02-06 ENCOUNTER — OFFICE VISIT (OUTPATIENT)
Dept: NEPHROLOGY | Facility: CLINIC | Age: 79
End: 2024-02-06
Payer: MEDICARE

## 2024-02-06 ENCOUNTER — OFFICE VISIT (OUTPATIENT)
Dept: GASTROENTEROLOGY | Facility: CLINIC | Age: 79
End: 2024-02-06
Payer: MEDICARE

## 2024-02-06 ENCOUNTER — TELEPHONE (OUTPATIENT)
Dept: NEPHROLOGY | Facility: CLINIC | Age: 79
End: 2024-02-06

## 2024-02-06 VITALS
SYSTOLIC BLOOD PRESSURE: 152 MMHG | WEIGHT: 167.2 LBS | HEART RATE: 62 BPM | DIASTOLIC BLOOD PRESSURE: 42 MMHG | HEIGHT: 70 IN | BODY MASS INDEX: 23.94 KG/M2

## 2024-02-06 VITALS
BODY MASS INDEX: 23.86 KG/M2 | DIASTOLIC BLOOD PRESSURE: 38 MMHG | SYSTOLIC BLOOD PRESSURE: 141 MMHG | HEART RATE: 63 BPM | HEIGHT: 70 IN | WEIGHT: 166.7 LBS

## 2024-02-06 DIAGNOSIS — K25.4 GASTROINTESTINAL HEMORRHAGE ASSOCIATED WITH GASTRIC ULCER: Primary | ICD-10-CM

## 2024-02-06 DIAGNOSIS — Z87.19 HISTORY OF GI BLEED: ICD-10-CM

## 2024-02-06 DIAGNOSIS — N17.9 ACUTE RENAL FAILURE SUPERIMPOSED ON STAGE 4 CHRONIC KIDNEY DISEASE, UNSPECIFIED ACUTE RENAL FAILURE TYPE (HCC): Primary | ICD-10-CM

## 2024-02-06 DIAGNOSIS — N18.30 BENIGN HYPERTENSION WITH CHRONIC KIDNEY DISEASE, STAGE III (HCC): ICD-10-CM

## 2024-02-06 DIAGNOSIS — N28.1 RENAL CYST, LEFT: ICD-10-CM

## 2024-02-06 DIAGNOSIS — E55.9 VITAMIN D DEFICIENCY: ICD-10-CM

## 2024-02-06 DIAGNOSIS — K86.1 OTHER CHRONIC PANCREATITIS (HCC): ICD-10-CM

## 2024-02-06 DIAGNOSIS — K86.81 EXOCRINE PANCREATIC INSUFFICIENCY: ICD-10-CM

## 2024-02-06 DIAGNOSIS — I12.9 BENIGN HYPERTENSION WITH CHRONIC KIDNEY DISEASE, STAGE III (HCC): ICD-10-CM

## 2024-02-06 DIAGNOSIS — K21.9 GASTROESOPHAGEAL REFLUX DISEASE, UNSPECIFIED WHETHER ESOPHAGITIS PRESENT: ICD-10-CM

## 2024-02-06 DIAGNOSIS — N18.4 TYPE 2 DIABETES MELLITUS WITH STAGE 4 CHRONIC KIDNEY DISEASE, WITHOUT LONG-TERM CURRENT USE OF INSULIN (HCC): ICD-10-CM

## 2024-02-06 DIAGNOSIS — K86.1 CHRONIC PANCREATITIS, UNSPECIFIED PANCREATITIS TYPE (HCC): ICD-10-CM

## 2024-02-06 DIAGNOSIS — N18.4 CKD (CHRONIC KIDNEY DISEASE) STAGE 4, GFR 15-29 ML/MIN (HCC): ICD-10-CM

## 2024-02-06 DIAGNOSIS — N25.81 SECONDARY HYPERPARATHYROIDISM OF RENAL ORIGIN (HCC): ICD-10-CM

## 2024-02-06 DIAGNOSIS — N18.4 ANEMIA DUE TO STAGE 4 CHRONIC KIDNEY DISEASE: ICD-10-CM

## 2024-02-06 DIAGNOSIS — N18.4 ACUTE RENAL FAILURE SUPERIMPOSED ON STAGE 4 CHRONIC KIDNEY DISEASE, UNSPECIFIED ACUTE RENAL FAILURE TYPE (HCC): Primary | ICD-10-CM

## 2024-02-06 DIAGNOSIS — D50.9 IRON DEFICIENCY ANEMIA, UNSPECIFIED IRON DEFICIENCY ANEMIA TYPE: ICD-10-CM

## 2024-02-06 DIAGNOSIS — Z95.2 S/P TAVR (TRANSCATHETER AORTIC VALVE REPLACEMENT): ICD-10-CM

## 2024-02-06 DIAGNOSIS — D63.1 ANEMIA DUE TO STAGE 4 CHRONIC KIDNEY DISEASE: ICD-10-CM

## 2024-02-06 DIAGNOSIS — E79.0 HYPERURICEMIA: ICD-10-CM

## 2024-02-06 DIAGNOSIS — K25.4 GASTROINTESTINAL HEMORRHAGE ASSOCIATED WITH GASTRIC ULCER: ICD-10-CM

## 2024-02-06 DIAGNOSIS — I65.23 ASYMPTOMATIC BILATERAL CAROTID ARTERY STENOSIS: Chronic | ICD-10-CM

## 2024-02-06 DIAGNOSIS — I70.213 ATHEROSCLEROSIS OF NATIVE ARTERIES OF EXTREMITIES WITH INTERMITTENT CLAUDICATION, BILATERAL LEGS (HCC): ICD-10-CM

## 2024-02-06 DIAGNOSIS — E11.22 TYPE 2 DIABETES MELLITUS WITH STAGE 4 CHRONIC KIDNEY DISEASE, WITHOUT LONG-TERM CURRENT USE OF INSULIN (HCC): ICD-10-CM

## 2024-02-06 DIAGNOSIS — E78.2 MIXED HYPERLIPIDEMIA: ICD-10-CM

## 2024-02-06 DIAGNOSIS — R80.1 PERSISTENT PROTEINURIA, UNSPECIFIED: ICD-10-CM

## 2024-02-06 DIAGNOSIS — I50.32 CHRONIC DIASTOLIC CHF (CONGESTIVE HEART FAILURE) (HCC): ICD-10-CM

## 2024-02-06 PROCEDURE — 99214 OFFICE O/P EST MOD 30 MIN: CPT | Performed by: PHYSICIAN ASSISTANT

## 2024-02-06 PROCEDURE — 99214 OFFICE O/P EST MOD 30 MIN: CPT | Performed by: INTERNAL MEDICINE

## 2024-02-06 RX ORDER — PANTOPRAZOLE SODIUM 40 MG/1
40 TABLET, DELAYED RELEASE ORAL 2 TIMES DAILY
Qty: 180 TABLET | Refills: 1 | Status: SHIPPED | OUTPATIENT
Start: 2024-02-06

## 2024-02-06 NOTE — PROGRESS NOTES
Assessment and Plan:  SHOAIB on CKD IV:   -Etiology: suspect prerenal due to depletion and hemodynamic changes in the setting of acute blood loss anemia  -Admission creatinine 2.93 on 12/4/2023.  Peak creatinine 2.93  -Current SHOAIB in the setting of recurrent GI bleed with admission creatinine 2.97 on 1/10/2024  -Discharge creatinine 2.07 on 1/13/2024  -baseline creatinine 2.2-2.4  -workup: UA with trace protein, otherwise bland without hematuria  -Imaging:  CT 12/22/2023 with multiple nonobstructing left-sided calculi, largest measuring 8 mm and right renal cyst without hydronephrosis  -most recent creatinine 2.62, GFR 22 on 1/23/2024  -Renal function worse, possibly related to volume depletion with higher dose of torsemide than usually prescribed.  Hold torsemide x 3 days then resume at 10 mg daily  -electrolyte and acid-base stable. Hgb stable and trending up.  -Avoid nephrotoxins, NSAIDs, IV contrast if possible  -Avoid hypotension  -Encourage hydration  -Optimize hemodynamic status to avoid delay in renal recovery  -Check BMP in 1 week and then 1 month if stable  -follow-up in office in 3-4 months with Dr. Muñoz with repeat blood and urine studies.    Chronic kidney disease IV:    -Etiology:  Due to hypertensive nephrosclerosis, age-related nephron loss, prior episodes of SHOAIB  -Baseline creatinine 2.2-2.4  -Follows with Dr. Muñoz  -recent creatinine 2.62,, eGFR 22, above baseline  -UACr 93 mg/g, UPCR 0.3 g, at goal  -Kidney Smart/CKD education discussed.  Patient not interested  -consider ACP discussion/appointment.  Patient has living will    Hypertension/Volume status:  -BP elevated but low diastolic pressures  -volume status mildly hypervolemic  -current medications: Amlodipine 10 mg daily, Toprol-XL 12.5 mg daily, torsemide 10 mg daily but taking 20 mg instead due to confusion regarding dose  -Off lisinopril 20 mg daily since hospitalization December 2023.  Continue to hold  -changes: Hold torsemide x 3  days and then resume at 10 mg daily due to elevated creatinine  -Avoid NSAIDs  -Avoid hypotension or fluctuations in blood pressure.   -low sodium (2 gm) diet.  Encourage regular exercise  -continue to monitor BP at home    Persistent proteinuria:  -Etiology: Likely due to hypertensive nephrosclerosis  -UA with trace protein.  -UPCR 0.3 g, UACR 93 mg/g, at goal  -Previously on lisinopril, held due to SHOAIB in December 2023.  Continue to hold in the setting of worsening renal function.  -refused Farxiga due to cost  -Optimize glycemic and BP control  -continue to monitor    ABLA on Anemia of CKD:  -Hgb 9.8, stable and trending up.  Goal >10  -hx recurrent GIB x 3 Dec '23 and Jan 24  -Recently hospitalized for GI bleed requiring 1 unit PRBC in Jan '24  -received 4 unit pRBC total  -continue to monitor    Bone Mineral Disease of CKD:  -Calcium stable  -Phosphorus 4.0, at goal.  Continue low phosphorus diet  -Secondary hyperparathyroidism of renal origin: .3, acceptable.  Continue calcitriol 0.25 mcg daily  -Vitamin D deficiency: Vitamin D 25 64, at goal.  Treated with ergocalciferol  -continue to monitor    Chronic diastolic congestive heart failure:  -compensated  -echo:  EF 60% with diastolic dysfunction  -volume status mild hypovolemia  -home diuretics: Torsemide 10 mg daily but patient presently taking 20 mg daily due to confusion overdose.  Will hold x 3 days due to elevated creatinine  -on beta blockers.  Lisinopril held since December at time of SHOAIB  -fluid restriction, 2 gm low sodium diet, daily weights  -management per Cardiology    Nephrolithiasis/renal cysts:  -Multiple bilateral nonobstructing renal calculi without hydronephrosis noted on recent CT imaging in December  -Encourage adequate hydration to maintain urine output >2L/day    Recurrent GI bleed:  -stable. H/H stable  -+PUD  -Hospitalized in December and January with melena/GI bleed  -Most recent EGD with clipping x 5 in January 2024  -DAPT  changed to single agent antiplatelet therapy due to recurrent GI bleed  -Management per GI.  Seen by GI today    Asymptomatic bilateral carotid artery stenosis:  -Carotid duplex with >70% bilateral ICA stenosis and severe R ECA stenosis.  -Asymptomatic  -continue plavix and statin  -Follow-up and surveillance per vascular surgery    PAD:  -Stable and currently asymptomatic without rest pain or claudication  -S/p R CFA endarterectomy with R SFA, popliteal and AT PCA March 2023  -Continue plavix and statin  -Follow-up and surveillance per vascular surgery    Chronic pancreatitis:  -No abdominal pain  -On Creon  -Management per GI    DM II:  -stable  -HgbA1C 6.1  -never started Farxiga due to cost and side effects  -continue to optimize glycemic control to slow progression of chronic kidney disease  -management per primary team    Dyslipidemia:  -stable  -continue statin  -low-cholesterol and low-fat diet, aerobic exercise  -management per PCP    CAD:  -Stable without angina  -S/p CABG and prior PCI  -Management per cardiology    Aortic stenosis:  -S/p TAVR June 2022  -Management per cardiology and cardiovascular surgery    Hyperuricemia:  -On allopurinol 300 mg daily  -Low purine diet  -Continue to monitor      Jay was seen today for follow-up.    Diagnoses and all orders for this visit:    Acute renal failure superimposed on stage 4 chronic kidney disease, unspecified acute renal failure type ([facility])  -     Basic metabolic panel; Future  -     Renal function panel; Future  -     Basic metabolic panel; Future    CKD (chronic kidney disease) stage 4, GFR 15-29 ml/min ([facility])  -     Basic metabolic panel; Future  -     Renal function panel; Future  -     PTH, intact; Future  -     Albumin / creatinine urine ratio; Future  -     CBC; Future  -     Magnesium; Future  -     Basic metabolic panel; Future    Persistent proteinuria, unspecified  -     Albumin / creatinine urine ratio; Future    Iron deficiency anemia,  unspecified iron deficiency anemia type  -     CBC; Future    History of GI bleed  -     CBC; Future    Vitamin D deficiency  -     PTH, intact; Future    Mixed hyperlipidemia    Benign hypertension with chronic kidney disease, stage III (HCC)    Chronic diastolic CHF (congestive heart failure) (HCC)    Atherosclerosis of native arteries of extremities with intermittent claudication, bilateral legs (HCC)    Secondary hyperparathyroidism of renal origin (HCC)  -     PTH, intact; Future    Type 2 diabetes mellitus with stage 4 chronic kidney disease, without long-term current use of insulin (HCC)    Chronic pancreatitis, unspecified pancreatitis type (HCC)    Gastrointestinal hemorrhage associated with gastric ulcer    Asymptomatic bilateral carotid artery stenosis    Renal cyst, left    S/P TAVR (transcatheter aortic valve replacement)    Anemia due to stage 4 chronic kidney disease     Hyperuricemia        Repeat BMP in 1 week.  Follow up with Dr. Muñoz in 3-4 months with repeat blood and urine studies.  Please call the office with any questions or concerns.      Reason for Visit: Follow-up (Acute kidney injury superimposed on chronic kidney disease)    HPI: Jay Roach Jr. is a 78 y.o. male with CKD 4, hx SHOAIB Dec '23 during admission for GI bleed, DM2, HTN, HLD, dCHF, CAD, s/p CABG and PCI, AAS, s/p TAVR, chronic pancreatitis, anemia, PAD with hx GI bleed x 2 with recent clips who presents for follow up of SHOAIB on CKD. Patient followed by Dr. Muñoz, last seen in office 9/5/23.  Patient was hospitalized and December and had an SHOAIB with peak creatinine 2.93 on 12/4/2023 due to acute blood loss anemia/prerenal.  Lisinopril held since December hospitalization due to SHOAIB.  He was again hospitalized in January '24 with recurrent GI bleed, s/p clips x 5 with recurrent SHOAIB with peak creatinine 2.97 on 1/10/2024.  Discharge creatinine 2.07 on 1/13/2024.  Most recent creatinine 2.62, above baseline.  Patient  reports poor fluid intake and currently taking torsemide 20 mg daily although the prescribed dose was 10 mg daily.  Patient denies recent hospitalizations or ER visits.  Patient denies NSAID use.  Patient denies nausea, vomiting, diarrhea, dyspnea, orthopnea, edema, hematuria or foamy urine.      ROS: A complete review of systems was performed and was negative unless otherwise noted in the history of present illness.    Allergies:   Patient has no known allergies.    Medications:     Current Outpatient Medications:     allopurinol (ZYLOPRIM) 300 mg tablet, Take 1 tablet (300 mg total) by mouth daily, Disp: 90 tablet, Rfl: 1    amLODIPine (NORVASC) 10 mg tablet, TAKE 1 TABLET DAILY, Disp: 90 tablet, Rfl: 3    atorvastatin (LIPITOR) 80 mg tablet, TAKE 1 TABLET DAILY AT     BEDTIME, Disp: 90 tablet, Rfl: 3    calcitriol (ROCALTROL) 0.25 mcg capsule, Take 1 capsule (0.25 mcg total) by mouth daily, Disp: 90 capsule, Rfl: 4    clopidogrel (PLAVIX) 75 mg tablet, Take 1 tablet (75 mg total) by mouth daily, Disp: 90 tablet, Rfl: 3    glimepiride (AMARYL) 1 mg tablet, Take 1 tablet (1 mg total) by mouth daily with breakfast, Disp: 90 tablet, Rfl: 1    metoprolol succinate (TOPROL-XL) 25 mg 24 hr tablet, Take 0.5 tablets (12.5 mg total) by mouth daily, Disp: 90 tablet, Rfl: 1    mupirocin (BACTROBAN) 2 % ointment, Apply topically 3 (three) times a day, Disp: 22 g, Rfl: 0    pancrelipase, Lip-Prot-Amyl, (CREON) 24,000 units, Take 24,000 units of lipase by mouth 3 (three) times a day with meals, Disp: 360 capsule, Rfl: 1    pantoprazole (PROTONIX) 40 mg tablet, Take 1 tablet (40 mg total) by mouth 2 (two) times a day, Disp: 180 tablet, Rfl: 1    torsemide (DEMADEX) 20 mg tablet, TAKE 0.5 TABLETS (10 MG TOTAL) BY MOUTH DAILY TAKES 10 MG ONCE A DAY. (Patient taking differently: Take 20 mg by mouth daily), Disp: 90 tablet, Rfl: 3    Past Medical History:   Diagnosis Date    Atherosclerosis of autologous vein bypass graft(s) of  other extremity with ulceration (HCC) 09/10/2021    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 (HCC) 2/24/2023    Basal cell carcinoma     right cheek    CAD (coronary artery disease)     Carotid stenosis, asymptomatic, bilateral     Chronic kidney disease     Colon polyp     Diabetes (HCC)     type 2, non-insulin dependent    Diabetes mellitus (HCC)     GERD (gastroesophageal reflux disease)     History of nephrolithiasis     Hyperlipidemia     Hypertension     Left foot pain 11/30/2022    PAD (peripheral artery disease) ([facility])     Severe aortic stenosis     Squamous cell skin cancer 11/22/2022    left superior helix     Past Surgical History:   Procedure Laterality Date    APPENDECTOMY      CARDIAC CATHETERIZATION N/A 05/05/2022    Procedure: CARDIAC RHC/LHC;  Surgeon: Arvin Sanchez DO;  Location: [facility];  Service: Cardiology    CARDIAC CATHETERIZATION N/A 05/05/2022    Procedure: Cardiac Coronary Angiogram;  Surgeon: Arvin Sanchez DO;  Location: [facility];  Service: Cardiology    CARDIAC CATHETERIZATION N/A 05/24/2022    Procedure: Cardiac pci;  Surgeon: Damien Jeter MD;  Location: [facility];  Service: Cardiology    CARDIAC CATHETERIZATION N/A 06/14/2022    Procedure: CARDIAC TAVR;  Surgeon: Nahum Vaz MD;  Location: [facility];  Service: Cardiology    COLECTOMY      COLONOSCOPY  2013    CORONARY ARTERY BYPASS GRAFT  2013    X 2    FEMORAL ARTERY - POPLITEAL ARTERY BYPASS GRAFT      HEMORRHOID SURGERY      IR AORTAGRAM WITH RUN-OFF  11/19/2018    IR AORTAGRAM WITH RUN-OFF  03/02/2023    IR LOWER EXTREMITY ANGIOGRAM  03/23/2023    MOHS SURGERY Left 01/11/2023    SCC left superior helix-Dr Tovar    [facility] SLCTV CATHJ 3RD+ ORD SLCTV ABDL PEL/LXTR BRNCH Left 08/12/2016    Procedure: LEFT FEMORAL ARTERIOGRAM; BALLOON ANGIOPLASTY; SFA  AND FEMORAL AT VEIN GRAFT;  Surgeon: Nicholas Urena MD;  Location: [facility];  Service:  "Vascular    MI TEAEC W/WO PATCH GRAFT COMMON FEMORAL Right 03/23/2023    Procedure: Common femoral endarterectomy&antegrade intervention of SFA, popliteal artery w/ shockwave;  Surgeon: Eagle Higuera MD;  Location: [facility];  Service: Vascular    MI TRANSCATHETER TRANSAPICAL REPLACEMT AORTIC VALVE N/A 06/14/2022    Procedure: REPLACEMENT AORTIC VALVE TRANSCATHETER (TAVR) TRANSAPICAL 29MM IRVIN KYLER S3 ULTRA VALVE(ACCESS ON LEFT) ELANA;  Surgeon: Amarjit Gordon DO;  Location:  MAIN OR;  Service: Cardiac Surgery    SKIN CANCER EXCISION      TONSILLECTOMY AND ADENOIDECTOMY      UPPER GASTROINTESTINAL ENDOSCOPY       Family History   Problem Relation Age of Onset    Heart attack Father     Other Sister         bypass and vlave replacement    Stroke Paternal Uncle     Arrhythmia Neg Hx     Asthma Neg Hx     Clotting disorder Neg Hx     Fainting Neg Hx     Anuerysm Neg Hx     Hypertension Neg Hx         unsure     Hyperlipidemia Neg Hx     Heart failure Neg Hx       reports that he has been smoking cigarettes. He has a 12.5 pack-year smoking history. He has been exposed to tobacco smoke. He has never used smokeless tobacco. He reports that he does not currently use alcohol. He reports that he does not use drugs.    Physical Exam:   Vitals:    02/06/24 1442   BP: (!) 152/42   BP Location: Left arm   Patient Position: Sitting   Cuff Size: Standard   Pulse: 62   Weight: 75.8 kg (167 lb 3.2 oz)   Height: 5' 10\" (1.778 m)     Body mass index is 23.99 kg/m².    General:  Awake, alert, appears comfortable and in no acute distress.  Nontoxic.  Skin:  No rash, warm, poor skin turgor   Eyes:  PERRL, EOMI, sclerae nonicteric.  no periorbital edema   ENT: Dry mucous membranes  Neck:  Trachea midline, symmetric.  No JVD.  No carotid bruits.  Chest:  Clear to auscultation bilaterally without wheezes, crackles or rhonchi  CVS:  Regular rate and rhythm without murmur, gallop or rub.  S1 and S2 identified and normal.  No " "S3, S4.   Abdomen:  Soft, nontender, nondistended without masses.  Normal bowel sounds x 4 quadrants.  No bruit.  Extremities:  Warm, pink, motor and sensory intact and well perfused.  No cyanosis, pallor.  No BLE edema.  Neuro:  Awake, alert, oriented x3.  Grossly intact  Psych:  Appropriate affect.  Mentating appropriately.  Normal mental status exam      Procedure:  No results found for this or any previous visit.    Lab Results   Component Value Date    GLUCOSE 163 (H) 03/23/2023    CALCIUM 9.2 01/23/2024     11/22/2015    K 4.0 01/23/2024    CO2 25 01/23/2024     01/23/2024    BUN 53 (H) 01/23/2024    CREATININE 2.62 (H) 01/23/2024             Invalid input(s): \"ALBUMIN\"    EMR, including Epic, Care Everywhere and outside scanned documents reviewed.  I have personally reviewed the blood work as stated above and in my note.  I have personally reviewed PCP, consultants and prior nephrology notes.    "

## 2024-02-06 NOTE — PROGRESS NOTES
[facility] - Outpatient Follow-up Note  Jay Roach Jr. 78 y.o. male MRN: [id number]  Encounter: [id number]          ASSESSMENT AND PLAN:      1. Gastrointestinal hemorrhage associated with gastric ulcer  Seemingly stable now off of aspirin.  Will continue pantoprazole 40 mg twice daily for at least the foreseeable future but may try to cut back to once daily dosing in the future    2. Gastroesophageal reflux disease, unspecified whether esophagitis present    - pantoprazole (PROTONIX) 40 mg tablet; Take 1 tablet (40 mg total) by mouth 2 (two) times a day  Dispense: 180 tablet; Refill: 1    3. Exocrine pancreatic insufficiency  History of stearrhea and prior vitamin D deficiency both improved on Creon.  This is quite expensive.  Discussed possibility of discontinuing this though his stearrhea would likely return.  Will check other fat-soluble vitamin levels    - Vitamin A and E; Future  - Vitamin K; Future    4. Other chronic pancreatitis (HCC)  Noted to have a hyperdensity on recent CT scan which was retrospectively seen on prior CT in 2021 and seemingly unchanged.  Likely benign.  Will evaluate further with MRI after 6 months    - MRI abdomen wo contrast and mrcp; Future    ______________________________________________________________________    SUBJECTIVE: Jay returns in follow-up of multiple recent gastrointestinal bleeds.  Has had multiple gastric ulcers with active bleeding treated with endoscopic therapy in December and again in January.  No evidence of further bleeding since his last endoscopy on January 11 and since stopping aspirin and increasing dose of pantoprazole to 40 mg twice daily.  No significant abdominal pain, nausea vomiting.  No melena or hematochezia.  Continues on Plavix.      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) 2/24/2023    Basal cell carcinoma     right cheek    CAD (coronary artery disease)     Carotid stenosis, asymptomatic, bilateral     Chronic kidney disease     Colon polyp     Diabetes (HCC)     type 2, non-insulin dependent    Diabetes mellitus (HCC)     GERD (gastroesophageal reflux disease)     History of nephrolithiasis     Hyperlipidemia     Hypertension     Left foot pain 11/30/2022    PAD (peripheral artery disease) (HCC)     Severe aortic stenosis     Squamous cell skin cancer 11/22/2022    left superior helix     Past Surgical History:   Procedure Laterality Date    APPENDECTOMY      CARDIAC CATHETERIZATION N/A 05/05/2022    Procedure: CARDIAC RHC/LHC;  Surgeon: Arvin Sanchez DO;  Location: [facility];  Service: Cardiology    CARDIAC CATHETERIZATION N/A 05/05/2022    Procedure: Cardiac Coronary Angiogram;  Surgeon: Arvin Sanchez DO;  [facility]: [facility];  Service: Cardiology    CARDIAC CATHETERIZATION N/A 05/24/2022    Procedure: Cardiac pci;  Surgeon: Damien Jeter MD;  Location: [facility];  Service: Cardiology    CARDIAC CATHETERIZATION N/A 06/14/2022    Procedure: CARDIAC TAVR;  Surgeon: Nahum Vaz MD;  Location: [facility];  Service: Cardiology    COLECTOMY      COLONOSCOPY  2013    CORONARY ARTERY BYPASS GRAFT  2013    X 2    FEMORAL ARTERY - POPLITEAL ARTERY BYPASS GRAFT      HEMORRHOID SURGERY      IR AORTAGRAM WITH RUN-OFF  11/19/2018    IR AORTAGRAM WITH RUN-OFF  03/02/2023    IR LOWER EXTREMITY ANGIOGRAM  03/23/2023    MOHS SURGERY Left 01/11/2023    SCC left superior helix-Dr Tovar    DC SLCTV CATHJ 3RD+ ORD SLCTV ABDL PEL/LXTR BRNCH Left 08/12/2016    Procedure: LEFT FEMORAL ARTERIOGRAM; BALLOON ANGIOPLASTY; SFA  AND FEMORAL AT VEIN GRAFT;  Surgeon: Nicholas Urena MD;  Location: [facility];  Service: Vascular    DC TEAEC W/WO  PATCH GRAFT COMMON FEMORAL Right 03/23/2023    Procedure: Common femoral endarterectomy&antegrade intervention of SFA, popliteal artery w/ shockwave;  Surgeon: Eagle Higuera MD;  Location: [facility];  Service: Vascular    CA TRANSCATHETER TRANSAPICAL REPLACEMT AORTIC VALVE N/A 06/14/2022    Procedure: REPLACEMENT AORTIC VALVE TRANSCATHETER (TAVR) TRANSAPICAL 29MM IRVIN KYLER S3 ULTRA VALVE(ACCESS ON LEFT) ELANA;  Surgeon: Amarjit Gordon DO;  Location: [facility];  Service: Cardiac Surgery    SKIN CANCER EXCISION      TONSILLECTOMY AND ADENOIDECTOMY      UPPER GASTROINTESTINAL ENDOSCOPY       Social History   Social History     Substance and Sexual Activity   Alcohol Use Not Currently    Comment: rare     Social History     Substance and Sexual Activity   Drug Use No     Social History     Tobacco Use   Smoking Status Every Day    Current packs/day: 0.25    Average packs/day: 0.3 packs/day for 50.0 years (12.5 ttl pk-yrs)    Types: Cigarettes    Passive exposure: Current   Smokeless Tobacco Never     Family History   Problem Relation Age of Onset    Heart attack Father     Other Sister         bypass and vlave replacement    Stroke Paternal Uncle     Arrhythmia Neg Hx     Asthma Neg Hx     Clotting disorder Neg Hx     Fainting Neg Hx     Anuerysm Neg Hx     Hypertension Neg Hx         unsure     Hyperlipidemia Neg Hx     Heart failure Neg Hx        Meds/Allergies       Current Outpatient Medications:     allopurinol (ZYLOPRIM) 300 mg tablet    amLODIPine (NORVASC) 10 mg tablet    atorvastatin (LIPITOR) 80 mg tablet    calcitriol (ROCALTROL) 0.25 mcg capsule    clopidogrel (PLAVIX) 75 mg tablet    glimepiride (AMARYL) 1 mg tablet    metoprolol succinate (TOPROL-XL) 25 mg 24 hr tablet    mupirocin (BACTROBAN) 2 % ointment    pancrelipase, Lip-Prot-Amyl, (CREON) 24,000 units    pantoprazole (PROTONIX) 40 mg tablet    torsemide (DEMADEX) 20 mg tablet    No Known Allergies        Objective     Blood pressure  "(!) 141/38, pulse 63, height 5' 10\" (1.778 m), weight 75.6 kg (166 lb 11.2 oz). Body mass index is 23.92 kg/m².      PHYSICAL EXAM:      Physical Exam  Vitals and nursing note reviewed.   Constitutional:       General: He is not in acute distress.  HENT:      Head: Normocephalic and atraumatic.      Mouth/Throat:      Mouth: Mucous membranes are moist.   Eyes:      General: No scleral icterus.     Pupils: Pupils are equal, round, and reactive to light.   Cardiovascular:      Rate and Rhythm: Normal rate and regular rhythm.   Pulmonary:      Effort: Pulmonary effort is normal. No respiratory distress.   Abdominal:      General: There is no distension.      Palpations: Abdomen is soft.      Tenderness: There is no abdominal tenderness. There is no guarding or rebound.   Musculoskeletal:         General: Normal range of motion.      Cervical back: Normal range of motion and neck supple.      Right lower leg: No edema.      Left lower leg: No edema.   Skin:     General: Skin is warm and dry.   Neurological:      General: No focal deficit present.      Mental Status: He is alert and oriented to person, place, and time.   Psychiatric:         Mood and Affect: Mood normal.         Behavior: Behavior normal.          Lab Results:   No visits with results within 1 Day(s) from this visit.   Latest known visit with results is:   Appointment on 01/23/2024   Component Date Value    Sodium 01/23/2024 141     Potassium 01/23/2024 4.0     Chloride 01/23/2024 105     CO2 01/23/2024 25     ANION GAP 01/23/2024 11     BUN 01/23/2024 53 (H)     Creatinine 01/23/2024 2.62 (H)     Glucose 01/23/2024 120     Calcium 01/23/2024 9.2     eGFR 01/23/2024 22          Radiology Results:   EGD    Addendum Date: 1/11/2024 Addendum:   [facility] [address] [facility] [address] [phone] DATE OF SERVICE: 1/11/24 PHYSICIAN(S): Attending: Karl Siddiqui MD Fellow: No Staff Documented INDICATION: Acute blood " loss anemia, Gastrointestinal hemorrhage associated with gastric ulcer POST-OP DIAGNOSIS: See the impression below. PREPROCEDURE: Informed consent was obtained for the procedure, including sedation.  Risks of perforation, hemorrhage, adverse drug reaction and aspiration were discussed. The patient was placed in the left lateral decubitus position. Patient was explained about the risks and benefits of the procedure. Risks including but not limited to bleeding, infection, and perforation were explained in detail. Also explained about less than 100% sensitivity with the exam and other alternatives. PROCEDURE: EGD DETAILS OF PROCEDURE: Patient was taken to the procedure room where a time out was performed to confirm correct patient and correct procedure. The patient underwent monitored anesthesia care, which was administered by an anesthesia professional. The patient's blood pressure, heart rate, level of consciousness, respirations and oxygen were monitored throughout the procedure. The scope was advanced to the second part of the duodenum. Retroflexion was performed in the fundus. The patient experienced no blood loss. The procedure was not difficult. The patient tolerated the procedure well. There were no apparent adverse events. ANESTHESIA INFORMATION: ASA: IV Anesthesia Type: Anesthesia type not filed in the log. MEDICATIONS: No administrations occurring from 1356 to 1414 on 01/11/24 FINDINGS: Regular Z-line 40 cm from the incisors Previously placed clips were seen in the fundus, there were  4 clip still in place.  No active/recent bleeding noted from prior ulcer site. Multiple small, superficial, linear ulcers in the greater curve of the stomach with flat pigmented spot (Mikey IIC); placed 5 clips successfully (clips are MRI conditional); hemostasis achieved; induced coagulation and hemostasis achieved with 3 applications of with bipolar cautery The duodenal bulb, 1st part of the duodenum and 2nd part of the  duodenum appeared normal. SPECIMENS: * No specimens in log * IMPRESSION: Multiple ulcers in the greater curve of the stomach with flat pigmented spot (Mikey IIC); placed 5 clips successfully; hemostasis achieved; induced coagulation and hemostasis achieved with bipolar cautery The duodenal bulb, 1st part of the duodenum and 2nd part of the duodenum appeared normal. RECOMMENDATION:  There is no recommended follow-up for this procedure. Recommend IV Protonix 40 mg twice daily. CLD for now. Hold anticoagulation if possible. Discuss with cardiology regarding need for dual antiplatelet therapy given recurrent GI bleeds. Monitor H&H and transfuse if needed.   Karl Siddiqui MD     Result Date: 1/11/2024  Narrative: Table formatting from the original result was not included. [facility] [address] [facility] [address] [phone] DATE OF SERVICE: 1/11/24 PHYSICIAN(S): Attending: Karl Siddiqui MD Fellow: No Staff Documented INDICATION: Acute blood loss anemia, Gastrointestinal hemorrhage associated with gastric ulcer POST-OP DIAGNOSIS: See the impression below. PREPROCEDURE: Informed consent was obtained for the procedure, including sedation.  Risks of perforation, hemorrhage, adverse drug reaction and aspiration were discussed. The patient was placed in the left lateral decubitus position. Patient was explained about the risks and benefits of the procedure. Risks including but not limited to bleeding, infection, and perforation were explained in detail. Also explained about less than 100% sensitivity with the exam and other alternatives. PROCEDURE: EGD DETAILS OF PROCEDURE: Patient was taken to the procedure room where a time out was performed to confirm correct patient and correct procedure. The patient underwent monitored anesthesia care, which was administered by an anesthesia professional. The patient's blood pressure, heart rate, level of consciousness, respirations and  oxygen were monitored throughout the procedure. The scope was advanced to the second part of the duodenum. Retroflexion was performed in the fundus. The patient experienced no blood loss. The procedure was not difficult. The patient tolerated the procedure well. There were no apparent adverse events. ANESTHESIA INFORMATION: ASA: IV Anesthesia Type: Anesthesia type not filed in the log. MEDICATIONS: No administrations occurring from 1356 to 1414 on 01/11/24 FINDINGS: Regular Z-line 40 cm from the incisors Previously placed clips were seen in the fundus, there were  4 clip still in place.  No active/recent bleeding noted from prior ulcer site. Multiple small, superficial, linear ulcers in the greater curve of the stomach with flat pigmented spot (Mikey IIC); placed 5 clips successfully (clips are MRI conditional); hemostasis achieved; induced coagulation and hemostasis achieved with 3 applications of with bipolar cautery The duodenal bulb, 1st part of the duodenum and 2nd part of the duodenum appeared normal. SPECIMENS: * No specimens in log *     Impression: Multiple ulcers in the greater curve of the stomach with flat pigmented spot (Mikey IIC); placed 5 clips successfully; hemostasis achieved; induced coagulation and hemostasis achieved with bipolar cautery The duodenal bulb, 1st part of the duodenum and 2nd part of the duodenum appeared normal. RECOMMENDATION:  There is no recommended follow-up for this procedure. Recommend IV Protonix 40 mg twice daily. Keep patient NPO. Hold anticoagulation if possible. Discuss with cardiology regarding need for dual antiplatelet therapy given recurrent GI bleeds. Monitor H&H and transfuse if needed.   Karl Siddiqui MD

## 2024-02-06 NOTE — PATIENT INSTRUCTIONS
Your kidney function remains stable.  Most recent creatinine 2.62, above baseline.  Recommend repeat labs in 1 week and then 1 month if remains stable..  We will continue routine surveillance as outlined below.  Hold torsemide x 3 days.  Resume torsemide 1/2 tablet daily on Saturday, 2/10/24.  Obtain labs on Friday, 2/9/24  Avoid all NSAIDs to include ibuprofen, Motrin, Aleve, Advil, Naproxen, Celebrex, Indomethacin, Toradol.  Stay well hydrated.   Continue all prescribed medications.  Call if blood pressure consistently more than 140/90 or less than 110/50s.  High and low blood pressures may affect your kidney function.  Recommend low salt diet (2 gm sodium diet).  Please let us know if you are scheduled for any studies with IV contrast (ex: CT scan, arteriogram or cardiac catheterization)   Follow up in 3-4 months with Dr. Muñoz with repeat labs prior to appointment.  Kidney Smart education program recommended for general education regarding your known chronic kidney disease.  Please contact the office with new symptoms or concerns.

## 2024-02-09 ENCOUNTER — APPOINTMENT (OUTPATIENT)
Dept: LAB | Facility: MEDICAL CENTER | Age: 79
End: 2024-02-09
Payer: MEDICARE

## 2024-02-09 DIAGNOSIS — K86.81 EXOCRINE PANCREATIC INSUFFICIENCY: ICD-10-CM

## 2024-02-09 DIAGNOSIS — N18.4 CKD (CHRONIC KIDNEY DISEASE) STAGE 4, GFR 15-29 ML/MIN (HCC): ICD-10-CM

## 2024-02-09 DIAGNOSIS — N17.9 ACUTE RENAL FAILURE SUPERIMPOSED ON STAGE 4 CHRONIC KIDNEY DISEASE, UNSPECIFIED ACUTE RENAL FAILURE TYPE (HCC): ICD-10-CM

## 2024-02-09 DIAGNOSIS — N18.4 ACUTE RENAL FAILURE SUPERIMPOSED ON STAGE 4 CHRONIC KIDNEY DISEASE, UNSPECIFIED ACUTE RENAL FAILURE TYPE (HCC): ICD-10-CM

## 2024-02-09 LAB
ANION GAP SERPL CALCULATED.3IONS-SCNC: 8 MMOL/L
BUN SERPL-MCNC: 34 MG/DL (ref 5–25)
CALCIUM SERPL-MCNC: 9.8 MG/DL (ref 8.4–10.2)
CHLORIDE SERPL-SCNC: 105 MMOL/L (ref 96–108)
CO2 SERPL-SCNC: 28 MMOL/L (ref 21–32)
CREAT SERPL-MCNC: 2.18 MG/DL (ref 0.6–1.3)
GFR SERPL CREATININE-BSD FRML MDRD: 27 ML/MIN/1.73SQ M
GLUCOSE SERPL-MCNC: 123 MG/DL (ref 65–140)
POTASSIUM SERPL-SCNC: 4 MMOL/L (ref 3.5–5.3)
SODIUM SERPL-SCNC: 141 MMOL/L (ref 135–147)

## 2024-02-09 PROCEDURE — 36415 COLL VENOUS BLD VENIPUNCTURE: CPT

## 2024-02-09 PROCEDURE — 84590 ASSAY OF VITAMIN A: CPT | Performed by: INTERNAL MEDICINE

## 2024-02-09 PROCEDURE — 80048 BASIC METABOLIC PNL TOTAL CA: CPT

## 2024-02-09 PROCEDURE — 84597 ASSAY OF VITAMIN K: CPT

## 2024-02-09 PROCEDURE — 84446 ASSAY OF VITAMIN E: CPT

## 2024-02-12 ENCOUNTER — TELEPHONE (OUTPATIENT)
Dept: NEPHROLOGY | Facility: CLINIC | Age: 79
End: 2024-02-12

## 2024-02-12 NOTE — TELEPHONE ENCOUNTER
Message left on patient's VM that CR improved after decreasing Torsemide.  Back to baseline.  Per Margaret, continue Torsemide 10 mg. QD.      ----- Message from Margaret Damian PA-C sent at 2/12/2024  7:20 AM EST -----  Labs reviewed.  Creatinine improved and at baseline after decreasing torsemide.  Please call patient and let him know his labs are stable, renal function is back to baseline.  Continue torsemide at 10 mg daily.

## 2024-02-16 LAB
MISCELLANEOUS LAB TEST RESULT: NORMAL
STONE ANALYSIS-IMP: NORMAL

## 2024-02-18 LAB — PHYTONADIONE SERPL-MCNC: 0.23 NG/ML (ref 0.1–2.2)

## 2024-02-20 NOTE — TELEPHONE ENCOUNTER
Patient called asking with his creatinine improving if he should go back on lisinopril.  Please advise.

## 2024-02-20 NOTE — TELEPHONE ENCOUNTER
LM for patient about the following, asked him to call back if he has any questions:    Please let the patient that he should continue to hold lisinopril for now due to recent SHOAIB.  It is best to give kidneys more time to recover.  If renal function stable at next appointment may consider resuming.

## 2024-02-21 ENCOUNTER — OFFICE VISIT (OUTPATIENT)
Dept: FAMILY MEDICINE CLINIC | Facility: CLINIC | Age: 79
End: 2024-02-21
Payer: MEDICARE

## 2024-02-21 VITALS
BODY MASS INDEX: 24.79 KG/M2 | SYSTOLIC BLOOD PRESSURE: 124 MMHG | WEIGHT: 173.2 LBS | HEART RATE: 56 BPM | OXYGEN SATURATION: 98 % | HEIGHT: 70 IN | RESPIRATION RATE: 16 BRPM | TEMPERATURE: 97.3 F | DIASTOLIC BLOOD PRESSURE: 64 MMHG

## 2024-02-21 DIAGNOSIS — I70.213 ATHEROSCLEROSIS OF NATIVE ARTERIES OF EXTREMITIES WITH INTERMITTENT CLAUDICATION, BILATERAL LEGS (HCC): Primary | ICD-10-CM

## 2024-02-21 DIAGNOSIS — Z99.11 DEPENDENCE ON RESPIRATOR (VENTILATOR) STATUS (HCC): ICD-10-CM

## 2024-02-21 DIAGNOSIS — I70.234 ATHEROSCLEROSIS OF NATIVE ARTERY OF RIGHT LOWER EXTREMITY WITH ULCERATION OF MIDFOOT (HCC): ICD-10-CM

## 2024-02-21 DIAGNOSIS — E11.22 CONTROLLED TYPE 2 DIABETES MELLITUS WITH STAGE 4 CHRONIC KIDNEY DISEASE, WITHOUT LONG-TERM CURRENT USE OF INSULIN (HCC): ICD-10-CM

## 2024-02-21 DIAGNOSIS — G44.209 TENSION HEADACHE: ICD-10-CM

## 2024-02-21 DIAGNOSIS — I74.3 EMBOLISM AND THROMBOSIS OF ARTERIES OF THE LOWER EXTREMITIES (HCC): ICD-10-CM

## 2024-02-21 DIAGNOSIS — Z95.2 S/P TAVR (TRANSCATHETER AORTIC VALVE REPLACEMENT): ICD-10-CM

## 2024-02-21 DIAGNOSIS — K86.1 CHRONIC PANCREATITIS, UNSPECIFIED PANCREATITIS TYPE (HCC): ICD-10-CM

## 2024-02-21 DIAGNOSIS — K21.9 GASTROESOPHAGEAL REFLUX DISEASE WITHOUT ESOPHAGITIS: ICD-10-CM

## 2024-02-21 DIAGNOSIS — I25.10 CORONARY ARTERY DISEASE INVOLVING NATIVE CORONARY ARTERY OF NATIVE HEART WITHOUT ANGINA PECTORIS: ICD-10-CM

## 2024-02-21 DIAGNOSIS — N18.4 CONTROLLED TYPE 2 DIABETES MELLITUS WITH STAGE 4 CHRONIC KIDNEY DISEASE, WITHOUT LONG-TERM CURRENT USE OF INSULIN (HCC): ICD-10-CM

## 2024-02-21 DIAGNOSIS — F17.219 CIGARETTE NICOTINE DEPENDENCE WITH NICOTINE-INDUCED DISORDER: ICD-10-CM

## 2024-02-21 DIAGNOSIS — I10 PRIMARY HYPERTENSION: ICD-10-CM

## 2024-02-21 DIAGNOSIS — R91.8 PULMONARY NODULES: ICD-10-CM

## 2024-02-21 PROBLEM — Z12.11 COLON CANCER SCREENING: Status: RESOLVED | Noted: 2024-01-10 | Resolved: 2024-02-21

## 2024-02-21 PROCEDURE — 99214 OFFICE O/P EST MOD 30 MIN: CPT | Performed by: FAMILY MEDICINE

## 2024-02-21 RX ORDER — METOPROLOL SUCCINATE 25 MG/1
12.5 TABLET, EXTENDED RELEASE ORAL DAILY
Qty: 90 TABLET | Refills: 1 | Status: SHIPPED | OUTPATIENT
Start: 2024-02-21

## 2024-02-21 NOTE — ASSESSMENT & PLAN NOTE
Patient encouraged to quit using tobacco for that increases their risk for COPD, lung cancer,stroke, oral cancer and heart disease. If patient does not want to quit they should let me know  when they are interested in quitting. There are numerous options to use to quit and we can discuss them.    DISCHARGE

## 2024-02-21 NOTE — ASSESSMENT & PLAN NOTE
Lab Results   Component Value Date    EGFR 27 02/09/2024    EGFR 22 01/23/2024    EGFR 29 01/13/2024    CREATININE 2.18 (H) 02/09/2024    CREATININE 2.62 (H) 01/23/2024    CREATININE 2.07 (H) 01/13/2024   Pt to avoid NSAIDs and any IV dyes. Patient to follow up eoither with nephrology or  with us for  further  monitoring of  renal function.

## 2024-02-21 NOTE — PROGRESS NOTES
Name: Jay Roach Jr.      : 1945      MRN: [id number]  Encounter Provider: Simón Hadley MD  Encounter Date: 2024   Encounter department: [facility]    Assessment & Plan     1. Atherosclerosis of native arteries of extremities with intermittent claudication, bilateral legs (HCC)  Assessment & Plan:  Patient is stable  and will continue present plan of care and reassess at next routine visit. All questions about this problem from patient were answered today.       2. Primary hypertension  Assessment & Plan:  Patient is stable with current anti-hypertensive medicine and continue to follow a low sodium diet and take current medication. All questions about this condition were answered today.       3. Coronary artery disease involving native coronary artery of native heart without angina pectoris  Assessment & Plan:  Patient to continue  with current cardiac meds to decrease risk of re stenosis. Patient to follow up with cardiology for scheduled appointments to decrease risk for further cardiac problems with appropriate diagnostic testing to reassess cardiac status. Patient had alll questions about this problem answered today.       4. Tension headache  Assessment & Plan:  Patient is stable  and will continue present plan of care and reassess at next routine visit. All questions about this problem from patient were answered today.       5. Cigarette nicotine dependence with nicotine-induced disorder  Assessment & Plan:  Patient encouraged to quit using tobacco for that increases their risk for COPD, lung cancer,stroke, oral cancer and heart disease. If patient does not want to quit they should let me know  when they are interested in quitting. There are numerous options to use to quit and we can discuss them.       6. Gastroesophageal reflux disease without esophagitis    7. Chronic pancreatitis, unspecified pancreatitis type (HCC)  Assessment & Plan:  Patient is  stable  and will continue present plan of care and reassess at next routine visit. All questions about this problem from patient were answered today.       8. Pulmonary nodules  -     CT chest wo contrast; Future; Expected date: 02/21/2024    9. Controlled type 2 diabetes mellitus with stage 4 chronic kidney disease, without long-term current use of insulin (HCC)  -     Hemoglobin A1C; Future    10. Embolism and thrombosis of arteries of the lower extremities (HCC)    11. Dependence on respirator (ventilator) status (HCC)    12. 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 They screened negative with a PHQ-2 score of 0.    Falls Plan of Care: balance, strength, and gait training instructions were provided. Home safety education provided.     Tobacco Cessation Counseling: Tobacco cessation counseling was provided. The patient is sincerely urged to quit consumption of tobacco. He is not ready to quit tobacco. Medication options and side effects of medication discussed. Patient agreed to medication.     Patient's shoes and socks removed.    Right Foot/Ankle   Right Foot Inspection  Skin Exam: skin normal. Skin not intact, no dry skin, no warmth, no callus, no erythema, no maceration, no abnormal color, no pre-ulcer, no ulcer and no callus.     Toe Exam: ROM and strength within normal limits. No tenderness    Sensory   Vibration: diminished  Proprioception: diminished  Monofilament testing: diminished    Vascular  Capillary refills: < 3 seconds  The right DP pulse is 2+. The right PT pulse is 2+.     Left Foot/Ankle  Left Foot Inspection  Skin Exam: Skin not intact, no dry skin, no warmth, no erythema, no maceration, normal color, no pre-ulcer, no ulcer and no callus.     Toe Exam: No swelling and no tenderness.     Sensory   Vibration: diminished  Proprioception:  diminished  Monofilament testing: diminished    Vascular  Capillary refills: < 3 seconds  The left DP pulse is 2+. The left PT pulse is 2+.     Assign Risk Category  No deformity present  Loss of protective sensation  Weak pulses  Risk: 2         Subjective     HPI  Review of Systems   Constitutional:  Negative for activity change, appetite change, chills, fatigue, fever and unexpected weight change.   HENT:  Negative for congestion, ear pain, hearing loss, mouth sores, postnasal drip, sinus pressure, sinus pain, sneezing and sore throat.    Respiratory:  Negative for apnea, cough, shortness of breath and wheezing.    Cardiovascular:  Negative for chest pain, palpitations and leg swelling.   Gastrointestinal:  Negative for abdominal pain, constipation, diarrhea, nausea and vomiting.   Endocrine: Negative for cold intolerance and heat intolerance.   Genitourinary:  Negative for dysuria, frequency and hematuria.   Musculoskeletal:  Negative for arthralgias, back pain, gait problem, joint swelling and neck pain.   Skin:  Negative for rash.   Neurological:  Negative for dizziness, weakness and numbness.   Hematological:  Does not bruise/bleed easily.   Psychiatric/Behavioral:  Negative for agitation, behavioral problems, confusion, hallucinations and sleep disturbance. The patient is not nervous/anxious.        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 ([facility]) 2/24/2023   • Basal cell carcinoma     right cheek   • CAD (coronary artery disease)    • Carotid stenosis, asymptomatic, bilateral    • Chronic kidney disease    • Colon polyp    • Diabetes ([facility])     type 2, non-insulin dependent   • Diabetes mellitus (HCC)    • GERD (gastroesophageal reflux disease)    • History of nephrolithiasis    • Hyperlipidemia    • Hypertension    •  Left foot pain 11/30/2022   • PAD (peripheral artery disease) ([facility])    • Severe aortic stenosis    • Squamous cell skin cancer 11/22/2022    left superior helix     Past Surgical History:   Procedure Laterality Date   • APPENDECTOMY     • CARDIAC CATHETERIZATION N/A 05/05/2022    Procedure: CARDIAC RHC/LHC;  Surgeon: Arvin Sanchez DO;  Location: [facility];  Service: Cardiology   • CARDIAC CATHETERIZATION N/A 05/05/2022    Procedure: Cardiac Coronary Angiogram;  Surgeon: Arvin Sanchez DO;  [facility]: [facility];  Service: Cardiology   • CARDIAC CATHETERIZATION N/A 05/24/2022    Procedure: Cardiac pci;  Surgeon: Damien Jeter MD;  Location: [facility];  Service: Cardiology   • CARDIAC CATHETERIZATION N/A 06/14/2022    Procedure: CARDIAC TAVR;  Surgeon: Nahum Vaz MD;  Location: [facility];  Service: Cardiology   • COLECTOMY     • COLONOSCOPY  2013   • CORONARY ARTERY BYPASS GRAFT  2013    X 2   • FEMORAL ARTERY - POPLITEAL ARTERY BYPASS GRAFT     • HEMORRHOID SURGERY     • IR AORTAGRAM WITH RUN-OFF  11/19/2018   • IR AORTAGRAM WITH RUN-OFF  03/02/2023   • IR LOWER EXTREMITY ANGIOGRAM  03/23/2023   • MOHS SURGERY Left 01/11/2023    SCC left superior helix-Dr Tovar   • MI SLCTV CATHJ 3RD+ ORD SLCTV ABDL PEL/LXTR BRNCH Left 08/12/2016    Procedure: LEFT FEMORAL ARTERIOGRAM; BALLOON ANGIOPLASTY; SFA  AND FEMORAL AT VEIN GRAFT;  Surgeon: Nicholas Urena MD;  Location: [facility];  Service: Vascular   • MI TEAEC W/WO PATCH GRAFT COMMON FEMORAL Right 03/23/2023    Procedure: Common femoral endarterectomy&antegrade intervention of SFA, popliteal artery w/ shockwave;  Surgeon: Eagle Higuera MD;  Location: [facility];  Service: Vascular   • MI TRANSCATHETER TRANSAPICAL REPLACEMT AORTIC VALVE N/A 06/14/2022    Procedure: REPLACEMENT AORTIC VALVE TRANSCATHETER (TAVR) TRANSAPICAL 29MM IRVIN KYLER S3 ULTRA VALVE(ACCESS ON LEFT) ELANA;  Surgeon: Amarjit Gordon DO;  Location:  [facility];  Service: Cardiac Surgery   • SKIN CANCER EXCISION     • TONSILLECTOMY AND ADENOIDECTOMY     • UPPER GASTROINTESTINAL ENDOSCOPY       Family History   Problem Relation Age of Onset   • Heart attack Father    • Other Sister         bypass and vlave replacement   • Stroke Paternal Uncle    • Arrhythmia Neg Hx    • Asthma Neg Hx    • Clotting disorder Neg Hx    • Fainting Neg Hx    • Anuerysm Neg Hx    • Hypertension Neg Hx         unsure    • Hyperlipidemia Neg Hx    • Heart failure Neg Hx      Social History     Socioeconomic History   • Marital status:      Spouse name: None   • Number of children: None   • Years of education: None   • Highest education level: None   Occupational History   • None   Tobacco Use   • Smoking status: Every Day     Current packs/day: 0.25     Average packs/day: 0.3 packs/day for 50.0 years (12.5 ttl pk-yrs)     Types: Cigarettes     Passive exposure: Current   • Smokeless tobacco: Never   Vaping Use   • Vaping status: Never Used   Substance and Sexual Activity   • Alcohol use: Not Currently     Comment: rare   • Drug use: No   • Sexual activity: Not Currently   Other Topics Concern   • None   Social History Narrative    · Most recent tobacco use screenin2018      · Do you currently or have you served in the  [employer]:   Yes      · If Yes, What branch of service:   [employer]      · Occupation:   Dentists      · Exercise level:   Occasional        · Caffeine intake:   Heavy      · Marital status:         · Diet:   Regular  low fat     · Seat belts used routinely:   Yes      · Smoke alarm in home:   Yes      · Advance directive:   Yes      · General stress level:   Low      Social Determinants of Health     Financial Resource Strain: Low Risk  (2023)    Overall Financial Resource Strain (CARDIA)    • Difficulty of Paying Living Expenses: Not very hard   Food Insecurity: No Food Insecurity (2024)    Hunger Vital Sign    • Worried About Running  Out of Food in the Last Year: Never true    • Ran Out of Food in the Last Year: Never true   Transportation Needs: No Transportation Needs (1/11/2024)    PRAPARE - Transportation    • Lack of Transportation (Medical): No    • Lack of Transportation (Non-Medical): No   Physical Activity: Not on file   Stress: Not on file   Social Connections: Not on file   Intimate Partner Violence: Not on file   Housing Stability: Low Risk  (1/11/2024)    Housing Stability Vital Sign    • Unable to Pay for Housing in the Last Year: No    • Number of Places Lived in the Last Year: 1    • Unstable Housing in the Last Year: No     Current Outpatient Medications on File Prior to Visit   Medication Sig   • amLODIPine (NORVASC) 10 mg tablet TAKE 1 TABLET DAILY   • atorvastatin (LIPITOR) 80 mg tablet TAKE 1 TABLET DAILY AT     BEDTIME   • calcitriol (ROCALTROL) 0.25 mcg capsule Take 1 capsule (0.25 mcg total) by mouth daily   • clopidogrel (PLAVIX) 75 mg tablet Take 1 tablet (75 mg total) by mouth daily   • glimepiride (AMARYL) 1 mg tablet Take 1 tablet (1 mg total) by mouth daily with breakfast   • mupirocin (BACTROBAN) 2 % ointment Apply topically 3 (three) times a day   • pancrelipase, Lip-Prot-Amyl, (CREON) 24,000 units Take 24,000 units of lipase by mouth 3 (three) times a day with meals   • pantoprazole (PROTONIX) 40 mg tablet Take 1 tablet (40 mg total) by mouth 2 (two) times a day   • allopurinol (ZYLOPRIM) 300 mg tablet Take 1 tablet (300 mg total) by mouth daily   • torsemide (DEMADEX) 20 mg tablet TAKE 0.5 TABLETS (10 MG TOTAL) BY MOUTH DAILY TAKES 10 MG ONCE A DAY. (Patient taking differently: Take 20 mg by mouth daily)   • [DISCONTINUED] metoprolol succinate (TOPROL-XL) 25 mg 24 hr tablet Take 0.5 tablets (12.5 mg total) by mouth daily     No Known Allergies  Immunization History   Administered Date(s) Administered   • COVID-19 Moderna mRNA Vaccine 12 Yr+ 50 mcg/0.5 mL (Spikevax) 10/04/2023   • COVID-19 PFIZER VACCINE 0.3 ML  "IM 03/03/2021, 03/24/2021, 09/30/2021   • COVID-19 Pfizer Vac BIVALENT Stuart-sucrose 12 Yr+ IM 09/29/2022   • H1N1 Inj 11/15/2020   • H1N1, All Formulations 11/15/2020   • INFLUENZA 10/04/2011, 10/19/2012, 10/16/2014, 10/13/2016, 10/06/2018, 11/11/2021, 10/19/2022, 10/19/2023   • Influenza Split High Dose Preservative Free IM 10/06/2018, 11/01/2019   • Pneumococcal Conjugate 13-Valent 12/04/2015   • Pneumococcal Polysaccharide PPV23 06/11/2019   • Respiratory Syncytial Virus Vaccine (Recombinant, Adjuvanted) 10/04/2023   • Tdap 10/15/2021       Objective     /64 (BP Location: Left arm, Patient Position: Sitting, Cuff Size: Standard)   Pulse 56   Temp (!) 97.3 °F (36.3 °C)   Resp 16   Ht 5' 10\" (1.778 m)   Wt 78.6 kg (173 lb 3.2 oz)   SpO2 98%   BMI 24.85 kg/m²     Physical Exam  Cardiovascular:      Pulses: Pulses are weak.           Dorsalis pedis pulses are 2+ on the right side and 2+ on the left side.        Posterior tibial pulses are 2+ on the right side and 2+ on the left side.   Musculoskeletal:        Feet:    Feet:      Right foot:      Skin integrity: No ulcer, skin breakdown, erythema, warmth, callus or dry skin.      Left foot:      Skin integrity: No ulcer, skin breakdown, erythema, warmth, callus or dry skin.       Simón Hadley MD    "

## 2024-02-22 ENCOUNTER — TELEPHONE (OUTPATIENT)
Dept: ADMINISTRATIVE | Facility: OTHER | Age: 79
End: 2024-02-22

## 2024-02-22 NOTE — TELEPHONE ENCOUNTER
Upon review of the In Basket request we were able to locate, review, and update the patient chart as requested for Diabetic Eye Exam.  Document found in Media.    Any additional questions or concerns should be emailed to the Practice Liaisons via the appropriate education email address, please do not reply via In Basket.    Thank you  Daphney Gallegos

## 2024-02-22 NOTE — TELEPHONE ENCOUNTER
----- Message from Hayde Ford sent at 2/21/2024  2:36 PM EST -----  Regarding: care gap request DM eye exam  02/21/24 2:36 PM    Hello, our patient attached above has had Diabetic Eye Exam completed/performed. Please assist in updating the patient chart by making an External outreach to Dr. Jaime facility located in [place], [place]. The date of service is within the past year.    Thank you,  Hayde Ford  [facility]

## 2024-02-26 ENCOUNTER — NURSE TRIAGE (OUTPATIENT)
Age: 79
End: 2024-02-26

## 2024-02-26 NOTE — TELEPHONE ENCOUNTER
"Pt returning results call. Reviewed labs and pt verbalized understanding.  Reason for Disposition  • Information only question and nurse able to answer    Answer Assessment - Initial Assessment Questions  1. REASON FOR CALL or QUESTION: \"What is your reason for calling today?\" or \"How can I best help you?\" or \"What question do you have that I can help answer?\"      Pt returning results call.    Protocols used: Information Only Call - No Triage-ADULT-[place]    "

## 2024-02-28 ENCOUNTER — OFFICE VISIT (OUTPATIENT)
Dept: VASCULAR SURGERY | Facility: CLINIC | Age: 79
End: 2024-02-28
Payer: MEDICARE

## 2024-02-28 ENCOUNTER — HOSPITAL ENCOUNTER (OUTPATIENT)
Dept: RADIOLOGY | Facility: MEDICAL CENTER | Age: 79
Discharge: HOME | End: 2024-02-28
Payer: MEDICARE

## 2024-02-28 VITALS
BODY MASS INDEX: 24.62 KG/M2 | WEIGHT: 172 LBS | HEIGHT: 70 IN | DIASTOLIC BLOOD PRESSURE: 46 MMHG | HEART RATE: 78 BPM | SYSTOLIC BLOOD PRESSURE: 148 MMHG

## 2024-02-28 DIAGNOSIS — I65.23 ASYMPTOMATIC BILATERAL CAROTID ARTERY STENOSIS: ICD-10-CM

## 2024-02-28 DIAGNOSIS — I70.213 ATHEROSCLEROSIS OF NATIVE ARTERIES OF EXTREMITIES WITH INTERMITTENT CLAUDICATION, BILATERAL LEGS (HCC): Primary | ICD-10-CM

## 2024-02-28 DIAGNOSIS — R91.8 PULMONARY NODULES: ICD-10-CM

## 2024-02-28 DIAGNOSIS — I70.234 ATHEROSCLEROSIS OF NATIVE ARTERY OF RIGHT LOWER EXTREMITY WITH ULCERATION OF MIDFOOT (HCC): ICD-10-CM

## 2024-02-28 PROCEDURE — 71250 CT THORAX DX C-: CPT

## 2024-02-28 PROCEDURE — G1004 CDSM NDSC: HCPCS

## 2024-02-28 PROCEDURE — 99214 OFFICE O/P EST MOD 30 MIN: CPT | Performed by: NURSE PRACTITIONER

## 2024-02-28 NOTE — ASSESSMENT & PLAN NOTE
-Known bilateral > 70% ICA stenosis.  -Asymptomatic  -In review of chart, unchanged since 2017.  -Neuroexam intact    Carotid duplex 12/21/2024 without significant change or progression of disease    Plan:  -Carotid duplex in 6 months  -Continue Plavix  -Continue statin  -Smoking cessation  -Educated on TIA and strokelike symptoms and when to seek medical attention

## 2024-02-28 NOTE — PROGRESS NOTES
"Assessment/Plan:    Atherosclerosis of native arteries of extremities with intermittent claudication, bilateral legs (HCC)  79 yo male smoker with HTN, HLD, CAD s/p CABG '13, DM type II, CKD, chronic d CHF, aortic stenosis s/p TAVR '22, asymptomatic bilateral  >7-% ICA stenosis, aortoiliac disease and PAD s/p  L fem- AT bypass '15 ([facility]), s/p  Angioplasty to bypass graft stenoses '16, s/p R CFAE, R SFA, Pop and AT angioplasty  3/23/23 presents to review imaging / [facility].    -Presents without complaints.  Denies claudication, ischemic rest pain or wounds.  He does endorse \"pressure\" to plantar surface of feet bilaterally.  -Patient follows with podiatry  - hospitalized for GI bleed, now on single anti-plt, plavix only.  - A1c 6.1  - s Cr 2.18/eGFR 27 (2/9/24)    LEAD 12/21/23:  RIGHT: This resting evaluation shows diffuse atherosclerotic disease in the femoral-popliteal segment with a 50-75% stenosis in the proximal profunda and  >75% stenosis in the proximal, mid and distal superficial femoral arteries. There is a 50-75% stenosis in the proximal popliteal artery.  The posterior tibial artery is occluded. SAMEER 0/0/0     LEFT: The common femoral artery is ectatic in caliber, 1.6 cm. There is a <50% stenosis in the common femoral artery, >75% stenosis in the  proximal profunda femoral artery and a <50% stenosis in the mid SFA. The superficial femoral- anterior tibial artery bypass graft is patent with a  >75% stenosis in the distal portion of the graft. SAMEER 0.58/58/59    Plan:  - will d/w Dr Higuera for final recommendations.   - in meantime, plan for LEAD in 3 months and office follow up with Dr Higuera  - Continue Plavix and statin  - Podiatry follow up as scheduled  - close monitoring of feet for wounds or skin breakdown  -Smoking cessation  - Call or return to office with worsening of symptoms or wounds immediately.      Asymptomatic bilateral carotid artery stenosis  -Known bilateral > 70% ICA " stenosis.  -Asymptomatic  -In review of chart, unchanged since 2017.  -Neuroexam intact    Carotid duplex 12/21/2024 without significant change or progression of disease    Plan:  -Carotid duplex in 6 months  -Continue Plavix  -Continue statin  -Smoking cessation  -Educated on TIA and strokelike symptoms and when to seek medical attention    Cigarette nicotine dependence with nicotine-induced disorder  -current pk per day smoker  -discussed the pathophysiology and relationship of smoking and vascular disease  -encourage smoking cessation    Tobacco use is a significant patient-modifiable risk factor for this patient’s vascular disease with multiple vascular comorbidities, and a significant risk factor for failure of and complications from any endovascular or surgical interventions.    I explained to the patient the effects of smoking including peripheral artery disease, coronary artery disease, cerebrovascular disease as well as cancer and chronic obstructive pulmonary disease. I asked the patient to stop smoking immediately. It is never too late to quit, and many studies show significant health benefits as well as economical savings after smoking cessation.   Based on our conversation, this patient does not appear ready to quit        The patient did not set a quit date.     I spent approximately 5 minutes on tobacco cessation counseling with this patient.      Hyperlipidemia  -stable  -continue statin therapy  -management per PCP         Diagnoses and all orders for this visit:    Atherosclerosis of native arteries of extremities with intermittent claudication, bilateral legs (HCC)  -     VAS ARTERIAL DUPLEX- LOWER LIMB BILATERAL; Future    Asymptomatic bilateral carotid artery stenosis  -     VAS carotid complete study; Future    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 (HCC)          Subjective:      Patient ID: Jay Roach Jr. is a 78 y.o. male.    Patient  "presents to review CV & FREDO. Denies s/s CVA. Patient denies claudication, rest pain, or tissue loss.     HPI    The following portions of the patient's history were reviewed and updated as appropriate: allergies, current medications, past family history, past medical history, past social history, past surgical history, and problem list.    Review of Systems   Constitutional: Negative.    HENT: Negative.     Eyes: Negative.    Respiratory: Negative.     Cardiovascular: Negative.    Gastrointestinal: Negative.    Endocrine: Negative.    Genitourinary: Negative.    Musculoskeletal: Negative.    Skin: Negative.    Allergic/Immunologic: Negative.    Neurological: Negative.    Hematological: Negative.    Psychiatric/Behavioral: Negative.         I have reviewed and made appropriate changes to the review of systems input by the medical assistant.    Objective:      BP (!) 148/46 (BP Location: Left arm, Patient Position: Sitting, Cuff Size: Standard)   Pulse 78   Ht 5' 10\" (1.778 m)   Wt 78 kg (172 lb)   BMI 24.68 kg/m²          Physical Exam  Vitals reviewed.   Constitutional:       General: He is not in acute distress.  HENT:      Head: Normocephalic.      Comments: R face laceration  Neck:      Vascular: Carotid bruit present.   Cardiovascular:      Rate and Rhythm: Normal rate.      Pulses:           Radial pulses are 2+ on the right side and 2+ on the left side.        Femoral pulses are 1+ on the right side and 1+ on the left side.       Dorsalis pedis pulses are 0 on the right side and 0 on the left side.        Posterior tibial pulses are 0 on the right side and detected w/ Doppler on the left side.      Comments: AT doppler signal b/l   Pulmonary:      Effort: Pulmonary effort is normal. No respiratory distress.      Breath sounds: Normal breath sounds.   Musculoskeletal:      Cervical back: Normal range of motion.      Right lower leg: No edema.      Left lower leg: No edema.   Skin:     General: Skin is warm. " "     Comments: Shiny, thin skin.  Right foot ruborous   Neurological:      Mental Status: He is alert.         I have spent a total time of 35 minutes on 02/28/24 in caring for this patient including Diagnostic results, Risks and benefits of tx options, Instructions for management, Patient and family education, Risk factor reductions, Impressions, Documenting in the medical record, Reviewing / ordering tests, medicine, procedures  , Obtaining or reviewing history  , and Communicating with other healthcare professionals .      Vitals:    02/28/24 1353   BP: (!) 148/46   BP Location: Left arm   Patient Position: Sitting   Cuff Size: Standard   Pulse: 78   Weight: 78 kg (172 lb)   Height: 5' 10\" (1.778 m)       Patient Active Problem List   Diagnosis    Atherosclerosis of native arteries of extremities with intermittent claudication, bilateral legs ([facility])    PVD (peripheral vascular disease) ([facility])    Stenosis of noncoronary bypass graft ([facility])    Asymptomatic bilateral carotid artery stenosis    S/P CABG (coronary artery bypass graft)    Hypertension    Aorto-iliac disease ([facility])    Renal artery stenosis, native, bilateral ([facility])    CAD (coronary artery disease)    Progressive angina ([facility])    Tension headache    Cigarette nicotine dependence with nicotine-induced disorder    Calcific tendinitis of right shoulder region    Right shoulder pain    Sinus bradycardia    Type 2 diabetes mellitus with stage 4 chronic kidney disease, without long-term current use of insulin ([facility])    GERD (gastroesophageal reflux disease)    Hyperlipidemia    Persistent proteinuria, unspecified    CKD (chronic kidney disease) stage 4, GFR 15-29 ml/min ([facility])    Leukopenia    Hypomagnesemia    Renal cyst, left    Acute kidney injury superimposed on chronic kidney disease     Iron deficiency anemia    Chronic diastolic CHF (congestive heart failure) ([facility])    Elevated serum creatinine    S/P TAVR (transcatheter aortic valve replacement)    Benign " hypertension with chronic kidney disease, stage III (HCC)    Secondary hyperparathyroidism of renal origin (HCC)    Anemia due to stage 4 chronic kidney disease     Hyperuricemia    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 (HCC)    Dependence on respirator (ventilator) status (HCC)    Platelets decreased (HCC)    Vitamin D deficiency    GI bleed    Acute blood loss anemia    Acute kidney injury superimposed on stage 4 chronic kidney disease (HCC)    Melena    Chronic pancreatitis (HCC)    Peptic ulcer    History of GI bleed    Anemia    Embolism and thrombosis of arteries of the lower extremities (HCC)       Past Surgical History:   Procedure Laterality Date    APPENDECTOMY      CARDIAC CATHETERIZATION N/A 05/05/2022    Procedure: CARDIAC RHC/LHC;  Surgeon: Arvin Sanchez DO;  [facility]: [facility];  Service: Cardiology    CARDIAC CATHETERIZATION N/A 05/05/2022    Procedure: Cardiac Coronary Angiogram;  Surgeon: Arvin Sanchez DO;  [facility]: [facility];  Service: Cardiology    CARDIAC CATHETERIZATION N/A 05/24/2022    Procedure: Cardiac pci;  Surgeon: Damien Jeter MD;  [facility]: [facility];  Service: Cardiology    CARDIAC CATHETERIZATION N/A 06/14/2022    Procedure: CARDIAC TAVR;  Surgeon: Nahum Vaz MD;  Location: [facility];  Service: Cardiology    COLECTOMY      COLONOSCOPY  2013    CORONARY ARTERY BYPASS GRAFT  2013    X 2    FEMORAL ARTERY - POPLITEAL ARTERY BYPASS GRAFT      HEMORRHOID SURGERY      IR AORTAGRAM WITH RUN-OFF  11/19/2018    IR AORTAGRAM WITH RUN-OFF  03/02/2023    IR LOWER EXTREMITY ANGIOGRAM  03/23/2023    MOHS SURGERY Left 01/11/2023    SCC left superior helix-Dr Guy    IN SLCTV CATHJ 3RD+ ORD SLCTV ABDL PEL/LXTR BRNCH Left 08/12/2016    Procedure: LEFT FEMORAL ARTERIOGRAM; BALLOON ANGIOPLASTY; SFA  AND FEMORAL AT VEIN GRAFT;  Surgeon: Nicholas Urena MD;  Location: [facility];  Service: Vascular    IN  TEAEC W/WO PATCH GRAFT COMMON FEMORAL Right 2023    Procedure: Common femoral endarterectomy&antegrade intervention of SFA, popliteal artery w/ shockwave;  Surgeon: Eagle Higuera MD;  Location: [facility];  Service: Vascular    AL TRANSCATHETER TRANSAPICAL REPLACEMT AORTIC VALVE N/A 2022    Procedure: REPLACEMENT AORTIC VALVE TRANSCATHETER (TAVR) TRANSAPICAL 29MM IRVIN KYLER S3 ULTRA VALVE(ACCESS ON LEFT) ELANA;  Surgeon: Amarjit Gordon DO;  Location: [facility];  Service: Cardiac Surgery    SKIN CANCER EXCISION      TONSILLECTOMY AND ADENOIDECTOMY      UPPER GASTROINTESTINAL ENDOSCOPY         Family History   Problem Relation Age of Onset    Heart attack Father     Other Sister         bypass and vlave replacement    Stroke Paternal Uncle     Arrhythmia Neg Hx     Asthma Neg Hx     Clotting disorder Neg Hx     Fainting Neg Hx     Anuerysm Neg Hx     Hypertension Neg Hx         unsure     Hyperlipidemia Neg Hx     Heart failure Neg Hx        Social History     Socioeconomic History    Marital status:      Spouse name: Not on file    Number of children: Not on file    Years of education: Not on file    Highest education level: Not on file   Occupational History    Not on file   Tobacco Use    Smoking status: Every Day     Current packs/day: 0.25     Average packs/day: 0.3 packs/day for 50.0 years (12.5 ttl pk-yrs)     Types: Cigarettes     Passive exposure: Current    Smokeless tobacco: Never   Vaping Use    Vaping status: Never Used   Substance and Sexual Activity    Alcohol use: Not Currently     Comment: rare    Drug use: No    Sexual activity: Not Currently   Other Topics Concern    Not on file   Social History Narrative    · Most recent tobacco use screenin2018      · Do you currently or have you served in the  [employer]:   Yes      · If Yes, What branch of service:   [employer]      · Occupation:   Dentists      · Exercise level:   Occasional        · Caffeine intake:    Heavy      · Marital status:         · Diet:   Regular  low fat     · Seat belts used routinely:   Yes      · Smoke alarm in home:   Yes      · Advance directive:   Yes      · General stress level:   Low      Social Determinants of Health     Financial Resource Strain: Low Risk  (8/2/2023)    Overall Financial Resource Strain (CARDIA)     Difficulty of Paying Living Expenses: Not very hard   Food Insecurity: No Food Insecurity (1/11/2024)    Hunger Vital Sign     Worried About Running Out of Food in the Last Year: Never true     Ran Out of Food in the Last Year: Never true   Transportation Needs: No Transportation Needs (1/11/2024)    PRAPARE - Transportation     Lack of Transportation (Medical): No     Lack of Transportation (Non-Medical): No   Physical Activity: Not on file   Stress: Not on file   Social Connections: Not on file   Intimate Partner Violence: Not on file   Housing Stability: Low Risk  (1/11/2024)    Housing Stability Vital Sign     Unable to Pay for Housing in the Last Year: No     Number of Places Lived in the Last Year: 1     Unstable Housing in the Last Year: No       No Known Allergies      Current Outpatient Medications:     allopurinol (ZYLOPRIM) 300 mg tablet, Take 1 tablet (300 mg total) by mouth daily, Disp: 90 tablet, Rfl: 1    amLODIPine (NORVASC) 10 mg tablet, TAKE 1 TABLET DAILY, Disp: 90 tablet, Rfl: 3    atorvastatin (LIPITOR) 80 mg tablet, TAKE 1 TABLET DAILY AT     BEDTIME, Disp: 90 tablet, Rfl: 3    calcitriol (ROCALTROL) 0.25 mcg capsule, Take 1 capsule (0.25 mcg total) by mouth daily, Disp: 90 capsule, Rfl: 4    clopidogrel (PLAVIX) 75 mg tablet, Take 1 tablet (75 mg total) by mouth daily, Disp: 90 tablet, Rfl: 3    glimepiride (AMARYL) 1 mg tablet, Take 1 tablet (1 mg total) by mouth daily with breakfast, Disp: 90 tablet, Rfl: 1    metoprolol succinate (TOPROL-XL) 25 mg 24 hr tablet, Take 0.5 tablets (12.5 mg total) by mouth daily, Disp: 90 tablet, Rfl: 1    mupirocin  (BACTROBAN) 2 % ointment, Apply topically 3 (three) times a day, Disp: 22 g, Rfl: 0    pancrelipase, Lip-Prot-Amyl, (CREON) 24,000 units, Take 24,000 units of lipase by mouth 3 (three) times a day with meals, Disp: 360 capsule, Rfl: 1    pantoprazole (PROTONIX) 40 mg tablet, Take 1 tablet (40 mg total) by mouth 2 (two) times a day, Disp: 180 tablet, Rfl: 1    torsemide (DEMADEX) 20 mg tablet, TAKE 0.5 TABLETS (10 MG TOTAL) BY MOUTH DAILY TAKES 10 MG ONCE A DAY. (Patient taking differently: Take 20 mg by mouth daily), Disp: 90 tablet, Rfl: 3

## 2024-02-28 NOTE — ASSESSMENT & PLAN NOTE
-current pk per day smoker  -discussed the pathophysiology and relationship of smoking and vascular disease  -encourage smoking cessation    Tobacco use is a significant patient-modifiable risk factor for this patient’s vascular disease with multiple vascular comorbidities, and a significant risk factor for failure of and complications from any endovascular or surgical interventions.    I explained to the patient the effects of smoking including peripheral artery disease, coronary artery disease, cerebrovascular disease as well as cancer and chronic obstructive pulmonary disease. I asked the patient to stop smoking immediately. It is never too late to quit, and many studies show significant health benefits as well as economical savings after smoking cessation.   Based on our conversation, this patient does not appear ready to quit        The patient did not set a quit date.     I spent approximately 5 minutes on tobacco cessation counseling with this patient.

## 2024-02-28 NOTE — PATIENT INSTRUCTIONS
Lower extremity arterial duplex in 3 months with return office visit with Dr. Higuera  Carotid duplex in 6 months  Continue daily Plavix and statin    Will discuss with Dr. Higuera if you would like to see you sooner.    Continue close monitoring of feet for breakdown or wounds  Call or return to office immediately for worsening symptoms, wounds or skin breakdown to feet.

## 2024-02-28 NOTE — ASSESSMENT & PLAN NOTE
"77 yo male smoker with HTN, HLD, CAD s/p CABG '13, DM type II, CKD, chronic d CHF, aortic stenosis s/p TAVR '22, asymptomatic bilateral  >7-% ICA stenosis, aortoiliac disease and PAD s/p  L fem- AT bypass '15 ([facility]), s/p  Angioplasty to bypass graft stenoses '16, s/p R CFAE, R SFA, Pop and AT angioplasty  3/23/23 presents to review imaging / [facility].    -Presents without complaints.  Denies claudication, ischemic rest pain or wounds.  He does endorse \"pressure\" to plantar surface of feet bilaterally.  -Patient follows with podiatry  - hospitalized for GI bleed, now on single anti-plt, plavix only.  - A1c 6.1  - s Cr 2.18/eGFR 27 (2/9/24)    LEAD 12/21/23:  RIGHT: This resting evaluation shows diffuse atherosclerotic disease in the femoral-popliteal segment with a 50-75% stenosis in the proximal profunda and  >75% stenosis in the proximal, mid and distal superficial femoral arteries. There is a 50-75% stenosis in the proximal popliteal artery.  The posterior tibial artery is occluded. SAMEER 0/0/0     LEFT: The common femoral artery is ectatic in caliber, 1.6 cm. There is a <50% stenosis in the common femoral artery, >75% stenosis in the  proximal profunda femoral artery and a <50% stenosis in the mid SFA. The superficial femoral- anterior tibial artery bypass graft is patent with a  >75% stenosis in the distal portion of the graft. SAMEER 0.58/58/59    Plan:  - will d/w Dr Higuera for final recommendations.   - in meantime, plan for LEAD in 3 months and office follow up with Dr Higuera  - Continue Plavix and statin  - Podiatry follow up as scheduled  - close monitoring of feet for wounds or skin breakdown  -Smoking cessation  - Call or return to office with worsening of symptoms or wounds immediately.    "

## 2024-03-05 ENCOUNTER — TELEPHONE (OUTPATIENT)
Dept: FAMILY MEDICINE CLINIC | Facility: CLINIC | Age: 79
End: 2024-03-05

## 2024-03-05 ENCOUNTER — TELEPHONE (OUTPATIENT)
Dept: VASCULAR SURGERY | Facility: CLINIC | Age: 79
End: 2024-03-05

## 2024-03-05 ENCOUNTER — APPOINTMENT (OUTPATIENT)
Dept: LAB | Facility: MEDICAL CENTER | Age: 79
End: 2024-03-05
Payer: MEDICARE

## 2024-03-05 DIAGNOSIS — N18.4 CONTROLLED TYPE 2 DIABETES MELLITUS WITH STAGE 4 CHRONIC KIDNEY DISEASE, WITHOUT LONG-TERM CURRENT USE OF INSULIN (HCC): ICD-10-CM

## 2024-03-05 DIAGNOSIS — E11.22 CONTROLLED TYPE 2 DIABETES MELLITUS WITH STAGE 4 CHRONIC KIDNEY DISEASE, WITHOUT LONG-TERM CURRENT USE OF INSULIN (HCC): ICD-10-CM

## 2024-03-05 DIAGNOSIS — N18.4 CKD (CHRONIC KIDNEY DISEASE) STAGE 4, GFR 15-29 ML/MIN (HCC): ICD-10-CM

## 2024-03-05 DIAGNOSIS — R80.1 PERSISTENT PROTEINURIA, UNSPECIFIED: ICD-10-CM

## 2024-03-05 LAB
ANION GAP SERPL CALCULATED.3IONS-SCNC: 11 MMOL/L
BUN SERPL-MCNC: 29 MG/DL (ref 5–25)
CALCIUM SERPL-MCNC: 9.4 MG/DL (ref 8.4–10.2)
CHLORIDE SERPL-SCNC: 104 MMOL/L (ref 96–108)
CO2 SERPL-SCNC: 28 MMOL/L (ref 21–32)
CREAT SERPL-MCNC: 2.24 MG/DL (ref 0.6–1.3)
EST. AVERAGE GLUCOSE BLD GHB EST-MCNC: 143 MG/DL
GFR SERPL CREATININE-BSD FRML MDRD: 26 ML/MIN/1.73SQ M
GLUCOSE P FAST SERPL-MCNC: 133 MG/DL (ref 65–99)
HBA1C MFR BLD: 6.6 %
POTASSIUM SERPL-SCNC: 4 MMOL/L (ref 3.5–5.3)
SODIUM SERPL-SCNC: 143 MMOL/L (ref 135–147)

## 2024-03-05 PROCEDURE — 83036 HEMOGLOBIN GLYCOSYLATED A1C: CPT

## 2024-03-05 PROCEDURE — 80048 BASIC METABOLIC PNL TOTAL CA: CPT

## 2024-03-05 PROCEDURE — 36415 COLL VENOUS BLD VENIPUNCTURE: CPT

## 2024-03-05 NOTE — TELEPHONE ENCOUNTER
"----- Message from ZHANNA Encinas sent at 3/1/2024 10:25 AM EST -----  Per Dr Higuera   \"Will arrange for short interval follow up to determine plan for left leg angiogram. Risk of worsening renal insufficiency with contrast exposure.\"    Please schedule short interval OV to see Dr Higuera.     Thank you!      ----- Message -----  From: Eagle Higuera MD  Sent: 2/29/2024   9:51 PM EST  To: ZHANNA Encinas    He has chronic elevated velocities so I can see him as outpatient   ----- Message -----  From: ZHANNA Encinas  Sent: 2/29/2024   8:23 AM EST  To: Eagle Higuera MD    I'm sorry, looks like patient did not attach.     Please review chart and LEAD and note below.    I can set up for OV with you or angiogram.    ----- Message -----  From: Eagle Higuera MD  Sent: 2/28/2024   6:13 PM EST  To: ZHANNA Encinas    He would benefit from arteriogram on left leg with distal balloon in order to improve bypass patency.    I cannot see any pt name.    ----- Message -----  From: ZHANNA Encinas  Sent: 2/28/2024   4:20 PM EST  To: Eagle Higuera MD    Hi Dr. Higuera    Hoping you can review the attached patient/chart.  I also sent my note to you today I hope that is okay. Known patient of yours.    Patient presented to review imaging and [facility] visit w/o complaints.   Unfortunately his LEAD was marked to be seen by vascular surgeon however he was scheduled with me.    Please review FREDO.  There is progression of disease bilaterally. Right SAMEER now 0 with GT 0. and also L fem-AT bypass graft is patent with a >75% stenosis in the distal portion of the graft. PSV is 529.    Patient is essentially asymptomatic. Denies claudication, ischemic rest pain, no wounds.    Unfortunately he does continue to smoke!    In addition to the PAD he has bilateral > 70% ICA stenosis however this is unchanged since 2017.    I am more looking for recommendations regarding his lower extremities/PAD.    At this " point I have plan to repeat his duplex in 3 months and return to the office at that time.    Encouraged close monitoring of his feet for wounds/ LE symptoms and smoking cessation.    If you think he needs ballooning of distal bypass let me know.     He does have CKD with creat of 2.18.    Thank you!!  Karen

## 2024-03-05 NOTE — TELEPHONE ENCOUNTER
Received a call from Sol with [facility] that there was a significant finding on the CT of the chest.

## 2024-03-06 ENCOUNTER — TELEPHONE (OUTPATIENT)
Dept: NEPHROLOGY | Facility: CLINIC | Age: 79
End: 2024-03-06

## 2024-03-06 NOTE — TELEPHONE ENCOUNTER
----- Message from Margaret Damian PA-C sent at 3/6/2024  2:49 PM EST -----  Labs reviewed.  Renal function stable and back to baseline.  Please call patient and let him know his labs are stable.  Follow-up in the office in May with Dr. Muñoz with repeat labs as scheduled

## 2024-03-07 ENCOUNTER — OFFICE VISIT (OUTPATIENT)
Dept: FAMILY MEDICINE CLINIC | Facility: CLINIC | Age: 79
End: 2024-03-07
Payer: MEDICARE

## 2024-03-07 ENCOUNTER — DOCUMENTATION (OUTPATIENT)
Dept: HEMATOLOGY ONCOLOGY | Facility: CLINIC | Age: 79
End: 2024-03-07

## 2024-03-07 VITALS
WEIGHT: 172 LBS | HEART RATE: 68 BPM | OXYGEN SATURATION: 94 % | SYSTOLIC BLOOD PRESSURE: 148 MMHG | DIASTOLIC BLOOD PRESSURE: 56 MMHG | TEMPERATURE: 97.7 F | BODY MASS INDEX: 24.62 KG/M2 | HEIGHT: 70 IN

## 2024-03-07 DIAGNOSIS — I25.10 CORONARY ARTERY DISEASE INVOLVING NATIVE CORONARY ARTERY OF NATIVE HEART WITHOUT ANGINA PECTORIS: ICD-10-CM

## 2024-03-07 DIAGNOSIS — E11.22 TYPE 2 DIABETES MELLITUS WITH STAGE 4 CHRONIC KIDNEY DISEASE, WITHOUT LONG-TERM CURRENT USE OF INSULIN (HCC): ICD-10-CM

## 2024-03-07 DIAGNOSIS — R91.8 MASS OF LEFT LUNG: Primary | ICD-10-CM

## 2024-03-07 DIAGNOSIS — E78.2 MIXED HYPERLIPIDEMIA: ICD-10-CM

## 2024-03-07 DIAGNOSIS — N18.30 BENIGN HYPERTENSION WITH CHRONIC KIDNEY DISEASE, STAGE III (HCC): ICD-10-CM

## 2024-03-07 DIAGNOSIS — I12.9 BENIGN HYPERTENSION WITH CHRONIC KIDNEY DISEASE, STAGE III (HCC): ICD-10-CM

## 2024-03-07 DIAGNOSIS — N18.4 TYPE 2 DIABETES MELLITUS WITH STAGE 4 CHRONIC KIDNEY DISEASE, WITHOUT LONG-TERM CURRENT USE OF INSULIN (HCC): ICD-10-CM

## 2024-03-07 PROCEDURE — G2211 COMPLEX E/M VISIT ADD ON: HCPCS | Performed by: FAMILY MEDICINE

## 2024-03-07 PROCEDURE — 99214 OFFICE O/P EST MOD 30 MIN: CPT | Performed by: FAMILY MEDICINE

## 2024-03-07 NOTE — PROGRESS NOTES
Intake received/ Chart reviewed for work up completed    Imaging completed: 2/28/24 CT chest; 10/4/22 CT lung nodule fu    Pathology completed:  N/A    All records needed are in patients chart. No records retrieval needed at this time.    Routing to care coordinator for scheduling of:  -Thoracic Surgery consult within 7 days.    Nurse Navigator outreach not needed at this time. OCC to call patient with appointment details.

## 2024-03-07 NOTE — ASSESSMENT & PLAN NOTE
Lab Results   Component Value Date    EGFR 26 03/05/2024    EGFR 27 02/09/2024    EGFR 22 01/23/2024    CREATININE 2.24 (H) 03/05/2024    CREATININE 2.18 (H) 02/09/2024    CREATININE 2.62 (H) 01/23/2024   Patient is stable with current anti-hypertensive medicine and continue to follow a low sodium diet and take current medication. All questions about this condition were answered today.

## 2024-03-07 NOTE — PROGRESS NOTES
Name: Jay Roach Jr.      : 1945      MRN: [id number]  Encounter Provider: Simón Hadley MD  Encounter Date: 3/7/2024   Encounter department: [facility]    Assessment & Plan     1. Mass of left lung  -     Ambulatory Referral to Thoracic Surgery; Future    2. Coronary artery disease involving native coronary artery of native heart without angina pectoris  Assessment & Plan:  Patient to continue  with current cardiac meds to decrease risk of re stenosis. Patient to follow up with cardiology for scheduled appointments to decrease risk for further cardiac problems with appropriate diagnostic testing to reassess cardiac status. Patient had alll questions about this problem answered today.       3. Benign hypertension with chronic kidney disease, stage III (HCC)  Assessment & Plan:  Lab Results   Component Value Date    EGFR 26 2024    EGFR 27 2024    EGFR 22 2024    CREATININE 2.24 (H) 2024    CREATININE 2.18 (H) 2024    CREATININE 2.62 (H) 2024   Patient is stable with current anti-hypertensive medicine and continue to follow a low sodium diet and take current medication. All questions about this condition were answered today.       4. Mixed hyperlipidemia  Assessment & Plan:  Patient  is stable with current medication and we discussed a low fat low cholesterol diet. Weight loss also discussed for this will help lower cholesterol also. Recheck lipids in 6 months.       5. Type 2 diabetes mellitus with stage 4 chronic kidney disease, without long-term current use of insulin (HCC)  Assessment & Plan:  Patient is stable with current meds and discussed a low carb diet. Pt  recommended to see eye doctor and foot doctor for routine screening as per protocol. Recheck A1C  and Cr in 3 months. Patient questions answered today about this condtion.   Lab Results   Component Value Date    HGBA1C 6.6 (H) 2024           Falls Plan of Care:  balance, strength, and gait training instructions were provided. Home safety education provided.       Subjective     HPI  Review of Systems   Constitutional:  Negative for activity change, appetite change, chills, fatigue, fever and unexpected weight change.   HENT:  Negative for congestion, ear pain, hearing loss, mouth sores, postnasal drip, sinus pressure, sinus pain, sneezing and sore throat.    Respiratory:  Negative for apnea, cough, shortness of breath and wheezing.    Cardiovascular:  Negative for chest pain, palpitations and leg swelling.   Gastrointestinal:  Negative for abdominal pain, constipation, diarrhea, nausea and vomiting.   Endocrine: Negative for cold intolerance and heat intolerance.   Genitourinary:  Negative for dysuria, frequency and hematuria.   Musculoskeletal:  Negative for arthralgias, back pain, gait problem, joint swelling and neck pain.   Skin:  Negative for rash.   Neurological:  Negative for dizziness, weakness and numbness.   Hematological:  Does not bruise/bleed easily.   Psychiatric/Behavioral:  Negative for agitation, behavioral problems, confusion, hallucinations and sleep disturbance. The patient is not nervous/anxious.        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) 2/24/2023   • Basal cell carcinoma     right cheek   • CAD (coronary artery disease)    • Carotid stenosis, asymptomatic, bilateral    • Chronic kidney disease    • Colon polyp    • Diabetes ([facility])     type 2, non-insulin dependent   • Diabetes mellitus ([facility])    • GERD (gastroesophageal reflux disease)    • History of nephrolithiasis    • Hyperlipidemia    • Hypertension    • Left foot pain 11/30/2022   • PAD (peripheral artery disease) ([facility])    • Severe aortic stenosis    • Squamous cell skin cancer 11/22/2022    left  superior helix     Past Surgical History:   Procedure Laterality Date   • APPENDECTOMY     • CARDIAC CATHETERIZATION N/A 05/05/2022    Procedure: CARDIAC RHC/LHC;  Surgeon: Arvin Sanchez DO;  Location: [facility];  Service: Cardiology   • CARDIAC CATHETERIZATION N/A 05/05/2022    Procedure: Cardiac Coronary Angiogram;  Surgeon: Arvin Sanchez DO;  [facility];  Service: Cardiology   • CARDIAC CATHETERIZATION N/A 05/24/2022    Procedure: Cardiac pci;  Surgeon: Damien Jeter MD;  [facility];  Service: Cardiology   • CARDIAC CATHETERIZATION N/A 06/14/2022    Procedure: CARDIAC TAVR;  Surgeon: Nahum Vaz MD;  Location: [facility];  Service: Cardiology   • COLECTOMY     • COLONOSCOPY  2013   • CORONARY ARTERY BYPASS GRAFT  2013    X 2   • FEMORAL ARTERY - POPLITEAL ARTERY BYPASS GRAFT     • HEMORRHOID SURGERY     • IR AORTAGRAM WITH RUN-OFF  11/19/2018   • IR AORTAGRAM WITH RUN-OFF  03/02/2023   • IR LOWER EXTREMITY ANGIOGRAM  03/23/2023   • MOHS SURGERY Left 01/11/2023    [facility] left superior [place]-Dr Tovar   • [place] SLCTV CATHJ 3RD+ ORD SLCTV ABDL PEL/LXTR BRNCH Left 08/12/2016    Procedure: LEFT FEMORAL ARTERIOGRAM; BALLOON ANGIOPLASTY; SFA  AND FEMORAL AT VEIN GRAFT;  Surgeon: Nicholas Urena MD;  Location: [facility];  Service: Vascular   • [place] TEAEC W/WO PATCH GRAFT COMMON FEMORAL Right 03/23/2023    Procedure: Common femoral endarterectomy&antegrade intervention of SFA, popliteal artery w/ shockwave;  Surgeon: Eagle Higuera MD;  Location: [facility];  Service: Vascular   • [place] TRANSCATHETER TRANSAPICAL REPLACEMT AORTIC VALVE N/A 06/14/2022    Procedure: REPLACEMENT AORTIC VALVE TRANSCATHETER (TAVR) TRANSAPICAL 29MM IRVIN KYLER S3 ULTRA VALVE(ACCESS ON LEFT) ELANA;  Surgeon: Amarjit Gordon DO;  Location: [facility];  Service: Cardiac Surgery   • SKIN CANCER EXCISION     • TONSILLECTOMY AND ADENOIDECTOMY     • UPPER GASTROINTESTINAL ENDOSCOPY        Family History   Problem Relation Age of Onset   • Heart attack Father    • Other Sister         bypass and vlave replacement   • Stroke Paternal Uncle    • Arrhythmia Neg Hx    • Asthma Neg Hx    • Clotting disorder Neg Hx    • Fainting Neg Hx    • Anuerysm Neg Hx    • Hypertension Neg Hx         unsure    • Hyperlipidemia Neg Hx    • Heart failure Neg Hx      Social History     Socioeconomic History   • Marital status:      Spouse name: None   • Number of children: None   • Years of education: None   • Highest education level: None   Occupational History   • None   Tobacco Use   • Smoking status: Every Day     Current packs/day: 0.25     Average packs/day: 0.3 packs/day for 50.0 years (12.5 ttl pk-yrs)     Types: Cigarettes     Passive exposure: Current   • Smokeless tobacco: Never   Vaping Use   • Vaping status: Never Used   Substance and Sexual Activity   • Alcohol use: Not Currently     Comment: rare   • Drug use: No   • Sexual activity: Not Currently   Other Topics Concern   • None   Social History Narrative    · Most recent tobacco use screenin2018      · Do you currently or have you served in the  [employer]:   Yes      · If Yes, What branch of service:   [employer]      · Occupation:   Dentists      · Exercise level:   Occasional        · Caffeine intake:   Heavy      · Marital status:         · Diet:   Regular  low fat     · Seat belts used routinely:   Yes      · Smoke alarm in home:   Yes      · Advance directive:   Yes      · General stress level:   Low      Social Determinants of Health     Financial Resource Strain: Low Risk  (2023)    Overall Financial Resource Strain (CARDIA)    • Difficulty of Paying Living Expenses: Not very hard   Food Insecurity: No Food Insecurity (2024)    Hunger Vital Sign    • Worried About Running Out of Food in the Last Year: Never true    • Ran Out of Food in the Last Year: Never true   Transportation Needs: No Transportation Needs  (1/11/2024)    PRAPARE - Transportation    • Lack of Transportation (Medical): No    • Lack of Transportation (Non-Medical): No   Physical Activity: Not on file   Stress: Not on file   Social Connections: Not on file   Intimate Partner Violence: Not on file   Housing Stability: Low Risk  (1/11/2024)    Housing Stability Vital Sign    • Unable to Pay for Housing in the Last Year: No    • Number of Places Lived in the Last Year: 1    • Unstable Housing in the Last Year: No     Current Outpatient Medications on File Prior to Visit   Medication Sig   • allopurinol (ZYLOPRIM) 300 mg tablet Take 1 tablet (300 mg total) by mouth daily   • amLODIPine (NORVASC) 10 mg tablet TAKE 1 TABLET DAILY   • atorvastatin (LIPITOR) 80 mg tablet TAKE 1 TABLET DAILY AT     BEDTIME   • calcitriol (ROCALTROL) 0.25 mcg capsule Take 1 capsule (0.25 mcg total) by mouth daily   • clopidogrel (PLAVIX) 75 mg tablet Take 1 tablet (75 mg total) by mouth daily   • glimepiride (AMARYL) 1 mg tablet Take 1 tablet (1 mg total) by mouth daily with breakfast   • metoprolol succinate (TOPROL-XL) 25 mg 24 hr tablet Take 0.5 tablets (12.5 mg total) by mouth daily   • pancrelipase, Lip-Prot-Amyl, (CREON) 24,000 units Take 24,000 units of lipase by mouth 3 (three) times a day with meals   • pantoprazole (PROTONIX) 40 mg tablet Take 1 tablet (40 mg total) by mouth 2 (two) times a day   • torsemide (DEMADEX) 20 mg tablet TAKE 0.5 TABLETS (10 MG TOTAL) BY MOUTH DAILY TAKES 10 MG ONCE A DAY. (Patient taking differently: Take 20 mg by mouth daily)   • mupirocin (BACTROBAN) 2 % ointment Apply topically 3 (three) times a day (Patient not taking: Reported on 3/7/2024)     No Known Allergies  Immunization History   Administered Date(s) Administered   • COVID-19 Moderna mRNA Vaccine 12 Yr+ 50 mcg/0.5 mL (Spikevax) 10/04/2023   • COVID-19 PFIZER VACCINE 0.3 ML IM 03/03/2021, 03/24/2021, 09/30/2021   • COVID-19 Pfizer Vac BIVALENT Stuart-sucrose 12 Yr+ IM 09/29/2022   •  "H1N1 Inj 11/15/2020   • H1N1, All Formulations 11/15/2020   • INFLUENZA 10/04/2011, 10/19/2012, 10/16/2014, 10/13/2016, 10/06/2018, 11/11/2021, 10/19/2022, 10/19/2023   • Influenza Split High Dose Preservative Free IM 10/06/2018, 11/01/2019   • Pneumococcal Conjugate 13-Valent 12/04/2015   • Pneumococcal Polysaccharide PPV23 06/11/2019   • Respiratory Syncytial Virus Vaccine (Recombinant, Adjuvanted) 10/04/2023   • Tdap 10/15/2021       Objective     /56 (BP Location: Left arm, Patient Position: Sitting, Cuff Size: Standard)   Pulse 68   Temp 97.7 °F (36.5 °C) (Skin)   Ht 5' 10\" (1.778 m)   Wt 78 kg (172 lb)   SpO2 94%   BMI 24.68 kg/m²     Physical Exam  Vitals and nursing note reviewed.   Constitutional:       Appearance: He is well-developed.   HENT:      Head: Normocephalic and atraumatic.      Nose: Nose normal.   Eyes:      General: No scleral icterus.     Conjunctiva/sclera: Conjunctivae normal.      Pupils: Pupils are equal, round, and reactive to light.   Neck:      Thyroid: No thyromegaly.   Cardiovascular:      Rate and Rhythm: Normal rate and regular rhythm.      Heart sounds: Normal heart sounds.   Pulmonary:      Effort: Pulmonary effort is normal. No respiratory distress.      Breath sounds: Normal breath sounds. No wheezing.   Abdominal:      General: Bowel sounds are normal.      Palpations: Abdomen is soft.      Tenderness: There is no abdominal tenderness. There is no guarding or rebound.   Musculoskeletal:         General: Normal range of motion.      Cervical back: Normal range of motion and neck supple.   Skin:     General: Skin is warm and dry.      Findings: No rash.   Neurological:      Mental Status: He is alert and oriented to person, place, and time.   Psychiatric:         Mood and Affect: Mood normal.         Behavior: Behavior normal.         Thought Content: Thought content normal.         Judgment: Judgment normal.       Simón Hadley MD    "

## 2024-03-07 NOTE — ASSESSMENT & PLAN NOTE
Patient is stable with current meds and discussed a low carb diet. Pt  recommended to see eye doctor and foot doctor for routine screening as per protocol. Recheck A1C  and Cr in 3 months. Patient questions answered today about this condtion.   Lab Results   Component Value Date    HGBA1C 6.6 (H) 03/05/2024

## 2024-03-08 ENCOUNTER — PATIENT OUTREACH (OUTPATIENT)
Dept: HEMATOLOGY ONCOLOGY | Facility: CLINIC | Age: 79
End: 2024-03-08

## 2024-03-08 NOTE — PROGRESS NOTES
Contacted patient as directed by HONG Lock  to schedule thoracic surgery consult .      Introduced myself and my role.      Type of appointment-Thoracic surgery consult   Provider-Dr. Schofield   Dvaj-3-  Time-3:40pm   Location-[place]    Patient had no other questions or concerns at this time. I provided my contact information in case any should arise.

## 2024-03-12 NOTE — TELEPHONE ENCOUNTER
----- Message from Melodie Carter sent at 1/11/2023  8:16 AM EST -----  Regarding: move to the hospital  As per anesthesia please move to the hospital none

## 2024-03-15 DIAGNOSIS — I25.10 CORONARY ARTERY DISEASE INVOLVING NATIVE HEART WITHOUT ANGINA PECTORIS, UNSPECIFIED VESSEL OR LESION TYPE: ICD-10-CM

## 2024-03-15 RX ORDER — CLOPIDOGREL BISULFATE 75 MG/1
75 TABLET ORAL DAILY
Qty: 10 TABLET | Refills: 1 | Status: SHIPPED | OUTPATIENT
Start: 2024-03-15

## 2024-03-18 ENCOUNTER — OFFICE VISIT (OUTPATIENT)
Dept: CARDIOLOGY CLINIC | Facility: MEDICAL CENTER | Age: 79
End: 2024-03-18
Payer: MEDICARE

## 2024-03-18 VITALS
OXYGEN SATURATION: 97 % | WEIGHT: 173 LBS | BODY MASS INDEX: 24.77 KG/M2 | HEIGHT: 70 IN | DIASTOLIC BLOOD PRESSURE: 60 MMHG | SYSTOLIC BLOOD PRESSURE: 146 MMHG | HEART RATE: 60 BPM

## 2024-03-18 DIAGNOSIS — I12.9 BENIGN HYPERTENSION WITH CHRONIC KIDNEY DISEASE, STAGE III (HCC): ICD-10-CM

## 2024-03-18 DIAGNOSIS — I77.9 AORTO-ILIAC DISEASE (HCC): Chronic | ICD-10-CM

## 2024-03-18 DIAGNOSIS — E78.2 MIXED HYPERLIPIDEMIA: ICD-10-CM

## 2024-03-18 DIAGNOSIS — Z95.2 S/P TAVR (TRANSCATHETER AORTIC VALVE REPLACEMENT): ICD-10-CM

## 2024-03-18 DIAGNOSIS — R00.1 SINUS BRADYCARDIA: ICD-10-CM

## 2024-03-18 DIAGNOSIS — I10 PRIMARY HYPERTENSION: Primary | ICD-10-CM

## 2024-03-18 DIAGNOSIS — Z95.1 S/P CABG (CORONARY ARTERY BYPASS GRAFT): Chronic | ICD-10-CM

## 2024-03-18 DIAGNOSIS — N18.30 BENIGN HYPERTENSION WITH CHRONIC KIDNEY DISEASE, STAGE III (HCC): ICD-10-CM

## 2024-03-18 DIAGNOSIS — I25.10 CORONARY ARTERY DISEASE INVOLVING NATIVE CORONARY ARTERY OF NATIVE HEART WITHOUT ANGINA PECTORIS: ICD-10-CM

## 2024-03-18 DIAGNOSIS — I70.213 ATHEROSCLEROSIS OF NATIVE ARTERIES OF EXTREMITIES WITH INTERMITTENT CLAUDICATION, BILATERAL LEGS (HCC): ICD-10-CM

## 2024-03-18 PROCEDURE — 99214 OFFICE O/P EST MOD 30 MIN: CPT | Performed by: INTERNAL MEDICINE

## 2024-03-18 RX ORDER — HYDRALAZINE HYDROCHLORIDE 25 MG/1
25 TABLET, FILM COATED ORAL 2 TIMES DAILY
Qty: 180 TABLET | Refills: 3 | Status: SHIPPED | OUTPATIENT
Start: 2024-03-18

## 2024-03-18 NOTE — PROGRESS NOTES
Cardiology   Jay Roach Jr. 78 y.o. male MRN: [id number]        Impression:  1. Coronary artery disease s/p CABG - s/p recent PCI of RCA  2. Hypertension - not adequate control.   3. Severe bilateral carotid disease - followed by vascular surgery.  4. Dyslipidemia - doing well.  5. Peripheral arterial disease - s/p LLE revascularization and RLE revascularization 3/23.  6. Aortic stenosis - s/p TAVR 6/22  7. Bradycardia - on low-dose b-blockers.   8. CKD - CKD IV  9.  GI bleeding - s/p EGD and clipping.      Recommendations:  1. Start hydralazine 25mg 2x/day.  2. Continue remainder of medications.  3. Follow up in 6 months.         HPI: Jay Roach Jr. is a 78 y.o. year old male with coronary artery disease s/p CABG with PCI of RCA 5/22, AS s.o TAVR 6/22, HTN, Dyslipidemia and LLE revascularization who returns for follow up. No chest pain, shortness of breath, or palpitations. Stress test 8/22 - no ischemia.  Echo 7/20 - normal cardiac function with normal TAVR. Does have headache.  Underwent RLE revascularization 3/23. Echo 6/23 - EF 65%, normal TAVR. Had GI bleeding in 12/23 and 1/24.  Underwent EGD with clipping of ulcers.  Aspirin discontinued.          Review of Systems   Constitutional: Negative.    HENT: Negative.     Eyes: Negative.    Respiratory:  Negative for chest tightness and shortness of breath.    Cardiovascular:  Negative for chest pain, palpitations and leg swelling.   Gastrointestinal: Negative.    Endocrine: Negative.    Genitourinary: Negative.    Musculoskeletal: Negative.    Skin: Negative.    Allergic/Immunologic: Negative.    Neurological: Negative.    Hematological: Negative.    Psychiatric/Behavioral: Negative.     All other systems reviewed and are negative.        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) 09/10/2021    Atherosclerosis of native artery of right lower extremity with  ulceration of midfoot (HCC) 2/24/2023    Basal cell carcinoma     right cheek    CAD (coronary artery disease)     Carotid stenosis, asymptomatic, bilateral     Chronic kidney disease     Colon polyp     Diabetes (HCC)     type 2, non-insulin dependent    Diabetes mellitus (HCC)     GERD (gastroesophageal reflux disease)     History of nephrolithiasis     Hyperlipidemia     Hypertension     Left foot pain 11/30/2022    PAD (peripheral artery disease) (HCC)     Severe aortic stenosis     Squamous cell skin cancer 11/22/2022    left superior helix     Past Surgical History:   Procedure Laterality Date    APPENDECTOMY      CARDIAC CATHETERIZATION N/A 05/05/2022    Procedure: CARDIAC RHC/LHC;  Surgeon: Arvin Sanchez DO;  Location: [facility];  Service: Cardiology    CARDIAC CATHETERIZATION N/A 05/05/2022    Procedure: Cardiac Coronary Angiogram;  Surgeon: Arvin Sanchez DO;  Location: [facility];  Service: Cardiology    CARDIAC CATHETERIZATION N/A 05/24/2022    Procedure: Cardiac pci;  Surgeon: Damien Jeter MD;  Location: [facility];  Service: Cardiology    CARDIAC CATHETERIZATION N/A 06/14/2022    Procedure: CARDIAC TAVR;  Surgeon: Nahum Vaz MD;  Location: [facility];  Service: Cardiology    COLECTOMY      COLONOSCOPY  2013    CORONARY ARTERY BYPASS GRAFT  2013    X 2    FEMORAL ARTERY - POPLITEAL ARTERY BYPASS GRAFT      HEMORRHOID SURGERY      IR AORTAGRAM WITH RUN-OFF  11/19/2018    IR AORTAGRAM WITH RUN-OFF  03/02/2023    IR LOWER EXTREMITY ANGIOGRAM  03/23/2023    MOHS SURGERY Left 01/11/2023    SCC left superior helix-Dr Tovar    AR SLCTV CATHJ 3RD+ ORD SLCTV ABDL PEL/LXTR BRNCH Left 08/12/2016    Procedure: LEFT FEMORAL ARTERIOGRAM; BALLOON ANGIOPLASTY; SFA  AND FEMORAL AT VEIN GRAFT;  Surgeon: Nicholas Urena MD;  Location: [facility];  Service: Vascular    AR TEAEC W/WO PATCH GRAFT COMMON FEMORAL Right 03/23/2023    Procedure: Common femoral  endarterectomy&antegrade intervention of SFA, popliteal artery w/ shockwave;  Surgeon: Eagle Higuera MD;  Location: [facility];  Service: Vascular    GA TRANSCATHETER TRANSAPICAL REPLACEMT AORTIC VALVE N/A 06/14/2022    Procedure: REPLACEMENT AORTIC VALVE TRANSCATHETER (TAVR) TRANSAPICAL 29MM IRVIN KYLER S3 ULTRA VALVE(ACCESS ON LEFT) ELANA;  Surgeon: Amarjit Gordon DO;  Location: [facility];  Service: Cardiac Surgery    SKIN CANCER EXCISION      TONSILLECTOMY AND ADENOIDECTOMY      UPPER GASTROINTESTINAL ENDOSCOPY       Social History     Substance and Sexual Activity   Alcohol Use Not Currently    Comment: rare     Social History     Substance and Sexual Activity   Drug Use No     Social History     Tobacco Use   Smoking Status Every Day    Current packs/day: 0.25    Average packs/day: 0.3 packs/day for 50.0 years (12.5 ttl pk-yrs)    Types: Cigarettes    Passive exposure: Current   Smokeless Tobacco Never     Family History   Problem Relation Age of Onset    Heart attack Father     Other Sister         bypass and vlave replacement    Stroke Paternal Uncle     Arrhythmia Neg Hx     Asthma Neg Hx     Clotting disorder Neg Hx     Fainting Neg Hx     Anuerysm Neg Hx     Hypertension Neg Hx         unsure     Hyperlipidemia Neg Hx     Heart failure Neg Hx        Allergies:  No Known Allergies    Medications:     Current Outpatient Medications:     allopurinol (ZYLOPRIM) 300 mg tablet, Take 1 tablet (300 mg total) by mouth daily, Disp: 90 tablet, Rfl: 1    amLODIPine (NORVASC) 10 mg tablet, TAKE 1 TABLET DAILY, Disp: 90 tablet, Rfl: 3    atorvastatin (LIPITOR) 80 mg tablet, TAKE 1 TABLET DAILY AT     BEDTIME, Disp: 90 tablet, Rfl: 3    calcitriol (ROCALTROL) 0.25 mcg capsule, Take 1 capsule (0.25 mcg total) by mouth daily, Disp: 90 capsule, Rfl: 4    clopidogrel (PLAVIX) 75 mg tablet, Take 1 tablet (75 mg total) by mouth daily, Disp: 10 tablet, Rfl: 1    glimepiride (AMARYL) 1 mg tablet, Take 1 tablet (1 mg  total) by mouth daily with breakfast, Disp: 90 tablet, Rfl: 1    metoprolol succinate (TOPROL-XL) 25 mg 24 hr tablet, Take 0.5 tablets (12.5 mg total) by mouth daily, Disp: 90 tablet, Rfl: 1    pancrelipase, Lip-Prot-Amyl, (CREON) 24,000 units, Take 24,000 units of lipase by mouth 3 (three) times a day with meals, Disp: 360 capsule, Rfl: 1    pantoprazole (PROTONIX) 40 mg tablet, Take 1 tablet (40 mg total) by mouth 2 (two) times a day, Disp: 180 tablet, Rfl: 1    torsemide (DEMADEX) 20 mg tablet, TAKE 0.5 TABLETS (10 MG TOTAL) BY MOUTH DAILY TAKES 10 MG ONCE A DAY. (Patient taking differently: Take 20 mg by mouth daily), Disp: 90 tablet, Rfl: 3    mupirocin (BACTROBAN) 2 % ointment, Apply topically 3 (three) times a day (Patient not taking: Reported on 3/7/2024), Disp: 22 g, Rfl: 0      Wt Readings from Last 3 Encounters:   03/18/24 78.5 kg (173 lb)   03/07/24 78 kg (172 lb)   02/28/24 78 kg (172 lb)     Temp Readings from Last 3 Encounters:   03/07/24 97.7 °F (36.5 °C) (Skin)   02/21/24 (!) 97.3 °F (36.3 °C)   01/16/24 97.7 °F (36.5 °C) (Temporal)     BP Readings from Last 3 Encounters:   03/18/24 146/60   03/07/24 148/56   02/28/24 (!) 148/46     Pulse Readings from Last 3 Encounters:   03/18/24 60   03/07/24 68   02/28/24 78         Physical Exam  HENT:      Head: Atraumatic.      Mouth/Throat:      Mouth: Mucous membranes are moist.   Eyes:      Extraocular Movements: Extraocular movements intact.   Cardiovascular:      Rate and Rhythm: Normal rate and regular rhythm.      Heart sounds: Normal heart sounds.   Pulmonary:      Effort: Pulmonary effort is normal.      Breath sounds: Normal breath sounds.   Abdominal:      General: Abdomen is flat.   Musculoskeletal:         General: Normal range of motion.      Cervical back: Normal range of motion.   Skin:     General: Skin is warm.   Neurological:      General: No focal deficit present.      Mental Status: He is alert and oriented to person, place, and time.  "  Psychiatric:         Mood and Affect: Mood normal.         Behavior: Behavior normal.           Laboratory Studies:  CMP:  Lab Results   Component Value Date     2015    K 4.0 2024     2024    CO2 28 2024    ANIONGAP 7 2015    BUN 29 (H) 2024    CREATININE 2.24 (H) 2024    GLUCOSE 163 (H) 2023    AST 10 (L) 01/10/2024    ALT 5 (L) 01/10/2024    EGFR 26 2024       Lipid Profile:   No results found for: \"CHOL\"  Lab Results   Component Value Date    HDL 52 2022     Lab Results   Component Value Date    LDLCALC 33 2022     Lab Results   Component Value Date    TRIG 50 2022       Cardiac testing:   EKG reviewed personally:   Results for orders placed during the hospital encounter of 21    Echo complete with contrast if indicated    [address] [address] [phone]    Transthoracic Echocardiogram  2D, M-mode, Doppler, and Color Doppler    Study date:  06-May-2021    Patient: TANNA BROWNLEE  MR number: [id number]  Account number: [id number]  : 1945  Age: 76 years  Gender: Male  Status: Outpatient  Location: [facility]  Height: 70 in  Weight: 181 lb  BP: 150/ 58 mmHg    Indications: Assess the aortic valve.    Diagnoses: I35.0 - Nonrheumatic aortic (valve) stenosis    Sonographer:  Cyn Chapa RDCS  Primary Physician:  Simón Hadley MD  Referring Physician:  Israel Sanchez MD  Group:  [facility]  Interpreting Physician:  Brayden Ortiz MD    SUMMARY    LEFT VENTRICLE:  Systolic function was normal. Ejection fraction was estimated to be 60 %.  There were no regional wall motion abnormalities.  Wall thickness was mildly increased.  Doppler parameters were consistent with abnormal left ventricular relaxation (grade 1 diastolic dysfunction).    LEFT ATRIUM:  The atrium was mildly dilated.    MITRAL VALVE:  There was mild " regurgitation.    AORTIC VALVE:  The valve was probably trileaflet. Leaflets exhibited moderately increased thickness and moderate calcification.  There was severe stenosis.  There was trace regurgitation.  Valve peak gradient was 63 mmHg.  Valve mean gradient was 32 mmHg.  Aortic valve area was 1 cmï¾² by the continuity equation.  Estimated aortic valve area (by VTI) was 0.91 cmï¾².    TRICUSPID VALVE:  There was trace regurgitation.    HISTORY: PRIOR HISTORY: CAD, bradycardia, DM, dyslipidemia, PAD, CABG,    PROCEDURE: The study was performed in the [facility]. This was a routine study. The transthoracic approach was used. The study included complete 2D imaging, M-mode, complete spectral Doppler, and color Doppler. The heart rate was  66 bpm, at the start of the study. Images were obtained from the parasternal, apical, subcostal, and suprasternal notch acoustic windows. Image quality was adequate.    LEFT VENTRICLE: Size was normal. Systolic function was normal. Ejection fraction was estimated to be 60 %. There were no regional wall motion abnormalities. Wall thickness was mildly increased. DOPPLER: Doppler parameters were consistent  with abnormal left ventricular relaxation (grade 1 diastolic dysfunction).    RIGHT VENTRICLE: The size was normal. Systolic function was normal. Wall thickness was normal.    LEFT ATRIUM: The atrium was mildly dilated.    RIGHT ATRIUM: Size was normal.    MITRAL VALVE: Valve structure was normal. There was normal leaflet separation. DOPPLER: The transmitral velocity was within the normal range. There was no evidence for stenosis. There was mild regurgitation.    AORTIC VALVE: The valve was probably trileaflet. Leaflets exhibited moderately increased thickness and moderate calcification. DOPPLER: There was severe stenosis. There was trace regurgitation.    TRICUSPID VALVE: The valve structure was normal. There was normal leaflet separation. DOPPLER: The transtricuspid  velocity was within the normal range. There was no evidence for stenosis. There was trace regurgitation.    PULMONIC VALVE: Leaflets exhibited normal thickness, no calcification, and normal cuspal separation. DOPPLER: The transpulmonic velocity was within the normal range. There was no significant regurgitation.    PERICARDIUM: There was no pericardial effusion. The pericardium was normal in appearance.    AORTA: The root exhibited normal size.    SYSTEMIC VEINS: IVC: The inferior vena cava was normal in size.    MEASUREMENT TABLES    2D MEASUREMENTS  LVOT   (Reference normals)  Diam   23 mm   (--)    DOPPLER MEASUREMENTS  LVOT   (Reference normals)  VTI   24 cm   (--)  R-R interval   1017 ms   (--)  HR   59 bpm   (--)  Stroke vol   99.71 ml   (--)  Cardiac output   5.88 L/min   (--)  Cardiac index   2.94 L/min/mï¾²   (--)  Aortic valve   (Reference normals)  VTI   108 cm   (--)  Peak gradient   63 mmHg   (--)  Mean gradient   32 mmHg   (--)  Valve area, cont   1 cmï¾²   (--)  Obstr index, VTI   0.22    (--)  Valve area, VTI   0.91 cmï¾²   (--)  Area index, VTI   0.46 cmï¾²/mï¾²   (--)    SYSTEM MEASUREMENT TABLES    2D  %FS: 23.52 %  Ao Diam: 3.06 cm  EDV(Teich): 137.67 ml  EF(Teich): 46.6 %  ESV(Teich): 73.51 ml  IVSd: 1.26 cm  LA Area: 24.18 cm2  LA Diam: 4.19 cm  LVEDV MOD A4C: 198.32 ml  LVEF MOD A4C: 54.08 %  LVESV MOD A4C: 91.07 ml  LVIDd: 5.34 cm  LVIDs: 4.08 cm  LVLd A4C: 10.15 cm  LVLs A4C: 8.98 cm  LVOT Diam: 2.43 cm  LVPWd: 1.18 cm  RA Area: 14.06 cm2  RVIDd: 3.75 cm  SV MOD A4C: 107.24 ml  SV(Teich): 64.16 ml    CW  AV Env.Ti: 414.84 ms  AV MaxP.54 mmHg  AV VTI: 103.91 cm  AV Vmax: 3.82 m/s  AV Vmean: 2.5 m/s  AV meanP.08 mmHg    MM  TAPSE: 2.13 cm    PW  LUIS (VTI): 1.06 cm2  LUIS Vmax: 1.04 cm2  E' Sept: 0.04 m/s  E/E' Sept: 22.83  LVOT Env.Ti: 482.08 ms  LVOT VTI: 23.7 cm  LVOT Vmax: 0.86 m/s  LVOT Vmean: 0.5 m/s  LVOT maxP.94 mmHg  LVOT meanP.25 mmHg  LVSV Dopp: 110.25 ml  MV A Pop:  1.23 m/s  MV Dec [place]: 5.05 m/s2  MV DecT: 180.67 ms  MV E Pop: 0.91 m/s  MV E/A Ratio: 0.74  MV PHT: 52.39 ms  MVA By PHT: 4.2 cm2    [facility] Accredited Echocardiography [facility]    Prepared and electronically signed by    Brayden Ortiz MD  Signed 06-May-2021 16:40:31    No results found for this or any previous visit.    No results found for this or any previous visit.    No results found for this or any previous visit.

## 2024-03-18 NOTE — PATIENT INSTRUCTIONS
Recommendations:  1. Start hydralazine 25mg 2x/day.  2. Continue remainder of medications.  3. Follow up in 6 months.

## 2024-03-21 ENCOUNTER — CONSULT (OUTPATIENT)
Dept: CARDIAC SURGERY | Facility: CLINIC | Age: 79
End: 2024-03-21
Payer: MEDICARE

## 2024-03-21 VITALS
WEIGHT: 174.82 LBS | BODY MASS INDEX: 25.03 KG/M2 | HEART RATE: 53 BPM | DIASTOLIC BLOOD PRESSURE: 55 MMHG | TEMPERATURE: 98 F | HEIGHT: 70 IN | OXYGEN SATURATION: 98 % | SYSTOLIC BLOOD PRESSURE: 150 MMHG | RESPIRATION RATE: 14 BRPM

## 2024-03-21 DIAGNOSIS — R91.8 MASS OF LEFT LUNG: ICD-10-CM

## 2024-03-21 PROCEDURE — 99205 OFFICE O/P NEW HI 60 MIN: CPT | Performed by: THORACIC SURGERY (CARDIOTHORACIC VASCULAR SURGERY)

## 2024-03-21 NOTE — LETTER
March 22, 2024     Simón Hadley MD  [address] [facility] [address]    Patient: Jay Roach Jr.   YOB: 1945   Date of Visit: 3/21/2024       Dear Dr. Hadley:    Thank you for referring Jay Roach to me for evaluation. Below are my notes for this consultation.    If you have questions, please do not hesitate to call me. I look forward to following your patient along with you.         Sincerely,        Ly Schofield MD        CC: No Recipients    Uzma Delgado PA-C  3/21/2024  4:30 PM  Attested  Thoracic Consult  Assessment/Plan:    Mass of left lung  CT scan was reviewed with findings of a 9mm left lower lobe pulmonary nodule. This was compared to patient's CT scan from 2022. This nodule appears to be slowly enlarging. Patient has previous PFTs completed in 2022 that were reviewed. Dr. Schofield is recommending patient undergo a PET CT scan and IR biopsy. Discussed what this imaging and biopsy entails. Ideally the PET CT scan would be completed before the biopsy. Patient will return in follow up after PET CT scan and biopsy is completed. There was a brief discussion of treatment options if this does represent a cancer. Encouraged smoking cessation. Patient declined referral to smoking cessation program at this time. All questions answered. Patient in agreement with plan.       Diagnoses and all orders for this visit:    Mass of left lung  -     Ambulatory Referral to Thoracic Surgery  -     NM PET CT skull base to mid thigh; Future  -     Ambulatory Referral to Interventional Radiology; Future          Thoracic History  Diagnosis: Left lower lobe pulmonary nodule    Procedures/Surgeries:    Pathology:    Adjuvant Therapy:       Subjective:    Patient ID: Jay Roach Jr. is a 79 y.o. male.    HPI    ECOG 0    Jay Roach Jr. is a 79 y.o. male with a PMH of CAD s/p CABG x2 and TAVR, hypertension, CKD, hyperlipidemia, type 2 diabetes mellitus, and approximately 60  pack year smoking history who was referred to our office by PCP for evaluation of an enlarging left lower lobe pulmonary nodule found on CT scan.     CT scan chest performed on 2/28/2024 was personally reviewed by myself and in PACS and reveals irregular nodular opacity in the left lower lobe, enlarged compared to the previous exam with a mean diameter of 9 mm.    PFTs completed on 4/14/2022 with FEV1 of 2.08L, 71% predicted, FVC 3.25L, 83% predicted, uncorrected DLCO 48% predicted.    On discussion today, patient reports daily morning productive cough. Denies hemoptysis. Has had some recent weight loss in the setting of 3 recent GI bleeds over the last 3 months. Has regained that weight. Did require blood transfusions. Denies shortness of breath. Had a cold about 2 weeks ago that has resolved. No known family history of lung cancer. Current smoker, smoking a pack a day with approximately 60-pack-year smoking history.  Patient also reports he had direct asbestos pressure as he worked in [employer] for 3 summers when he was younger.  Patient reports he had a CABG x 2 approximately 10 to 15 years ago and TAVR in June 2022. Patient is on Plavix.        The following portions of the patient's history were reviewed and updated as appropriate: allergies, current medications, past family history, past medical history, past social history, past surgical history, and problem list.    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 (HCC) 2/24/2023   • Basal cell carcinoma     right cheek   • CAD (coronary artery disease)    • Carotid stenosis, asymptomatic, bilateral    • Chronic kidney disease    • Colon polyp    • Diabetes (HCC)     type 2, non-insulin dependent   • Diabetes mellitus (HCC)    • GERD (gastroesophageal reflux disease)     • History of nephrolithiasis    • Hyperlipidemia    • Hypertension    • Left foot pain 11/30/2022   • PAD (peripheral artery disease) ([facility])    • Severe aortic stenosis    • Squamous cell skin cancer 11/22/2022    left superior helix      Past Surgical History:   Procedure Laterality Date   • APPENDECTOMY     • CARDIAC CATHETERIZATION N/A 05/05/2022    Procedure: CARDIAC RHC/LHC;  Surgeon: Arvin Sanchez DO;  [facility];  Service: Cardiology   • CARDIAC CATHETERIZATION N/A 05/05/2022    Procedure: Cardiac Coronary Angiogram;  Surgeon: Arvin Sanchez DO;  [facility];  Service: Cardiology   • CARDIAC CATHETERIZATION N/A 05/24/2022    Procedure: Cardiac pci;  Surgeon: Damien Jeter MD;  [facility];  Service: Cardiology   • CARDIAC CATHETERIZATION N/A 06/14/2022    Procedure: CARDIAC TAVR;  Surgeon: Nahum Vaz MD;  Location: [facility];  Service: Cardiology   • COLECTOMY     • COLONOSCOPY  2013   • CORONARY ARTERY BYPASS GRAFT  2013    X 2   • FEMORAL ARTERY - POPLITEAL ARTERY BYPASS GRAFT     • HEMORRHOID SURGERY     • IR AORTAGRAM WITH RUN-OFF  11/19/2018   • IR AORTAGRAM WITH RUN-OFF  03/02/2023   • IR LOWER EXTREMITY ANGIOGRAM  03/23/2023   • MOHS SURGERY Left 01/11/2023    SCC left superior helix-Dr Tovar   • TN SLCTV CATHJ 3RD+ ORD SLCTV ABDL PEL/LXTR BRNCH Left 08/12/2016    Procedure: LEFT FEMORAL ARTERIOGRAM; BALLOON ANGIOPLASTY; SFA  AND FEMORAL AT VEIN GRAFT;  Surgeon: Nicholas Urena MD;  Location[facility];  Service: Vascular   • TN TEAEC W/WO PATCH GRAFT COMMON FEMORAL Right 03/23/2023    Procedure: Common femoral endarterectomy&antegrade intervention of SFA, popliteal artery w/ shockwave;  Surgeon: Eagle Higuera MD;  Location: [facility];  Service: Vascular   • TN TRANSCATHETER TRANSAPICAL REPLACEMT AORTIC VALVE N/A 06/14/2022    Procedure: REPLACEMENT AORTIC VALVE TRANSCATHETER (TAVR) TRANSAPICAL 29MM IRVIN KYLER S3  ULTRA VALVE(ACCESS ON LEFT) ELANA;  Surgeon: Amarjit Gordon DO;  Location[facility];  Service: Cardiac Surgery   • SKIN CANCER EXCISION     • TONSILLECTOMY AND ADENOIDECTOMY     • UPPER GASTROINTESTINAL ENDOSCOPY        Family History   Problem Relation Age of Onset   • Heart attack Father    • Other Sister         bypass and vlave replacement   • Stroke Paternal Uncle    • Arrhythmia Neg Hx    • Asthma Neg Hx    • Clotting disorder Neg Hx    • Fainting Neg Hx    • Anuerysm Neg Hx    • Hypertension Neg Hx         unsure    • Hyperlipidemia Neg Hx    • Heart failure Neg Hx       Social History     Socioeconomic History   • Marital status:      Spouse name: Not on file   • Number of children: Not on file   • Years of education: Not on file   • Highest education level: Not on file   Occupational History   • Not on file   Tobacco Use   • Smoking status: Every Day     Current packs/day: 0.25     Average packs/day: 0.3 packs/day for 50.0 years (12.5 ttl pk-yrs)     Types: Cigarettes     Passive exposure: Current   • Smokeless tobacco: Never   Vaping Use   • Vaping status: Never Used   Substance and Sexual Activity   • Alcohol use: Not Currently     Comment: rare   • Drug use: No   • Sexual activity: Not Currently   Other Topics Concern   • Not on file   Social History Narrative    · Most recent tobacco use screenin2018      · Do you currently or have you served in the  [employer]   Yes      · If Yes, What branch of service:   [employer]      · Occupation:   Dentists      · Exercise level:   Occasional        · Caffeine intake:   Heavy      · Marital status:         · Diet:   Regular  low fat     · Seat belts used routinely:   Yes      · Smoke alarm in home:   Yes      · Advance directive:   Yes      · General stress level:   Low      Social Determinants of Health     Financial Resource Strain: Low Risk  (2023)    Overall Financial Resource Strain (CARDIA)    • Difficulty of Paying Living  "Expenses: Not very hard   Food Insecurity: No Food Insecurity (1/11/2024)    Hunger Vital Sign    • Worried About Running Out of Food in the Last Year: Never true    • Ran Out of Food in the Last Year: Never true   Transportation Needs: No Transportation Needs (1/11/2024)    PRAPARE - Transportation    • Lack of Transportation (Medical): No    • Lack of Transportation (Non-Medical): No   Physical Activity: Not on file   Stress: Not on file   Social Connections: Not on file   Intimate Partner Violence: Not on file   Housing Stability: Low Risk  (1/11/2024)    Housing Stability Vital Sign    • Unable to Pay for Housing in the Last Year: No    • Number of Places Lived in the Last Year: 1    • Unstable Housing in the Last Year: No      Review of Systems   Constitutional:  Negative for chills, fever and unexpected weight change.   HENT:  Negative for rhinorrhea and sore throat.    Respiratory:  Positive for cough. Negative for shortness of breath.    Neurological:  Negative for headaches.         Objective:   Physical Exam  Constitutional:       General: He is not in acute distress.  HENT:      Head: Normocephalic and atraumatic.      Nose: Nose normal.   Eyes:      Extraocular Movements: Extraocular movements intact.      Comments: Wears glasses   Cardiovascular:      Rate and Rhythm: Normal rate and regular rhythm.   Pulmonary:      Effort: Pulmonary effort is normal.      Breath sounds: Normal breath sounds.   Abdominal:      Palpations: Abdomen is soft.      Tenderness: There is no abdominal tenderness.   Musculoskeletal:      Right lower leg: No edema.      Left lower leg: No edema.   Skin:     General: Skin is warm and dry.     /55 (BP Location: Left arm, Patient Position: Sitting, Cuff Size: Standard)   Pulse (!) 53   Temp 98 °F (36.7 °C) (Temporal)   Resp 14   Ht 5' 10\" (1.778 m)   Wt 79.3 kg (174 lb 13.2 oz)   SpO2 98%   BMI 25.08 kg/m²     No Chest XR results available for this patient.   CT chest " wo contrast    Result Date: 3/5/2024  Narrative CT CHEST WITHOUT IV CONTRAST INDICATION: R91.8: Other nonspecific abnormal finding of lung field. COMPARISON: CT abdomen and pelvis December 22, 2023, CT chest October 4, 2022, April 2021 TECHNIQUE: CT examination of the chest was performed without intravenous contrast. Multiplanar 2D reformatted images were created from the source data. This examination, like all CT scans performed in the [facility], was performed utilizing techniques to minimize radiation dose exposure, including the use of iterative reconstruction and automated exposure control. Radiation dose length product (DLP) for this visit: 276 mGy-cm FINDINGS: LUNGS: Severe pulmonary emphysema with scattered granulomata. Irregular nodular opacity is demonstrated in the left lower lobe series 3 image 156, enlarged compared to the previous exam with a mean diameter of 9 mm. Enlargement of the nodule raises concern for malignancy. There was a small nodular density at this level on the previous exam. Mild reticular interstitial thickening at the lung bases right more than left is stable. Linear density right upper lobe 3/38 suggesting an area of scarring. There is no tracheal or endobronchial lesion. PLEURA: Unremarkable. HEART/GREAT VESSELS: Heart is unremarkable for patient's age. No interval change in high density material at the cardiac apex 2/85. No thoracic aortic aneurysm. TAVR. Median sternotomy. Extensive coronary calcifications. MEDIASTINUM AND MICHAEL: Unremarkable. CHEST WALL AND LOWER NECK: Unremarkable. VISUALIZED STRUCTURES IN THE UPPER ABDOMEN: Atherosclerotic disease of the abdominal aorta and its branches. Calcifications and atrophy of the visualized portions of the pancreas. OSSEOUS STRUCTURES: No acute fracture or destructive osseous lesion. Flowing osteophytes are demonstrated at the level of the thoracic spine. There is stable sclerosis involving the T2 vertebral body with  posterior spinous process fusion.     Impression 1. Enlargement of the left lower lobe lung nodule which now has a diameter of 9 mm. Indeterminate solid pulmonary nodule measuring 9 mm. In a patient of unknown risk level with a solid nodule >8 mm, consider PET/CT or tissue sampling, vs. CT at 3 months. Juan Luis CARDOZA et al. Guidelines for Management of Incidental Pulmonary Nodules Detected on CT Images: From the Fleischner Society 2017. Radiology. 2017 Jul;284(1):228-243. The study was marked in EPIC for significant notification and additional evaluation. Workstation performed: [id number]     No CT Chest,Abdomen,Pelvis results available for this patient.   No NM PET CT results available for this patient.   No Barium Swallow results available for this patient.    Attestation signed by Ly Schofield MD at 3/22/2024 12:45 PM:  Attending Attestation:    I supervised the Advanced Practitioner.  I discussed the case with the Advanced Practitioner, reviewed the note and agree.    Mr. Roach is an 79-year-old male who presents to me with an enlarging left lower lobe superior segment nodule.  I have personally reviewed all of his imaging in PACS.  His CT scan demonstrates an approximately 9 mm pulmonary nodule.  This was present on his CT scan from a few years ago where it was approximately 6 mm at that time.  Today in the office, we discussed this nodule.  I explained to the patient that since he has risk factors for lung cancer and this is demonstrated interval growth and it has a spiculated appearance, this is concerning for lung cancer.  At this time, I am recommending a PET CT scan as well as PFTs and an IR biopsy.  The patient understands that he agrees with the plan.  I will have the patient follow-up back up with me after the above has been completed in order to determine the next steps in treating his nodule.    Ly Schofield MD  Thoracic Surgery  (Available on [employer])  Office: [phone]

## 2024-03-21 NOTE — PROGRESS NOTES
Thoracic Consult  Assessment/Plan:    Mass of left lung  CT scan was reviewed with findings of a 9mm left lower lobe pulmonary nodule. This was compared to patient's CT scan from 2022. This nodule appears to be slowly enlarging. Patient has previous PFTs completed in 2022 that were reviewed. Dr. Schofield is recommending patient undergo a PET CT scan and IR biopsy. Discussed what this imaging and biopsy entails. Ideally the PET CT scan would be completed before the biopsy. Patient will return in follow up after PET CT scan and biopsy is completed. There was a brief discussion of treatment options if this does represent a cancer. Encouraged smoking cessation. Patient declined referral to smoking cessation program at this time. All questions answered. Patient in agreement with plan.       Diagnoses and all orders for this visit:    Mass of left lung  -     Ambulatory Referral to Thoracic Surgery  -     NM PET CT skull base to mid thigh; Future  -     Ambulatory Referral to Interventional Radiology; Future          Thoracic History   Diagnosis: Left lower lobe pulmonary nodule    Procedures/Surgeries:    Pathology:    Adjuvant Therapy:       Subjective:    Patient ID: Jay Roach Jr. is a 79 y.o. male.    HPI    ECOG 0    Jay Roach Jr. is a 79 y.o. male with a PMH of CAD s/p CABG x2 and TAVR, hypertension, CKD, hyperlipidemia, type 2 diabetes mellitus, and approximately 60 pack year smoking history who was referred to our office by PCP for evaluation of an enlarging left lower lobe pulmonary nodule found on CT scan.     CT scan chest performed on 2/28/2024 was personally reviewed by myself and in PACS and reveals irregular nodular opacity in the left lower lobe, enlarged compared to the previous exam with a mean diameter of 9 mm.    PFTs completed on 4/14/2022 with FEV1 of 2.08L, 71% predicted, FVC 3.25L, 83% predicted, uncorrected DLCO 48% predicted.    On discussion today, patient reports daily morning  productive cough. Denies hemoptysis. Has had some recent weight loss in the setting of 3 recent GI bleeds over the last 3 months. Has regained that weight. Did require blood transfusions. Denies shortness of breath. Had a cold about 2 weeks ago that has resolved. No known family history of lung cancer. Current smoker, smoking a pack a day with approximately 60-pack-year smoking history.  Patient also reports he had direct asbestos pressure as he worked in [employer] for 3 summers when he was younger.  Patient reports he had a CABG x 2 approximately 10 to 15 years ago and TAVR in June 2022. Patient is on Plavix.        The following portions of the patient's history were reviewed and updated as appropriate: allergies, current medications, past family history, past medical history, past social history, past surgical history, and problem list.    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 (HCC) 2/24/2023    Basal cell carcinoma     right cheek    CAD (coronary artery disease)     Carotid stenosis, asymptomatic, bilateral     Chronic kidney disease     Colon polyp     Diabetes (HCC)     type 2, non-insulin dependent    Diabetes mellitus (HCC)     GERD (gastroesophageal reflux disease)     History of nephrolithiasis     Hyperlipidemia     Hypertension     Left foot pain 11/30/2022    PAD (peripheral artery disease) (HCC)     Severe aortic stenosis     Squamous cell skin cancer 11/22/2022    left superior helix      Past Surgical History:   Procedure Laterality Date    APPENDECTOMY      CARDIAC CATHETERIZATION N/A 05/05/2022    Procedure: CARDIAC RHC/LHC;  Surgeon: Arvin Sanchez DO;  [facility]: [facility];  Service: Cardiology    CARDIAC CATHETERIZATION N/A 05/05/2022    Procedure: Cardiac Coronary Angiogram;   Surgeon: Arvin Sanchez DO;  [facility];  Service: Cardiology    CARDIAC CATHETERIZATION N/A 05/24/2022    Procedure: Cardiac pci;  Surgeon: Damien Jeter MD;  [facility];  Service: Cardiology    CARDIAC CATHETERIZATION N/A 06/14/2022    Procedure: CARDIAC TAVR;  Surgeon: Nahum Vaz MD;  Location: [facility];  Service: Cardiology    COLECTOMY      COLONOSCOPY  2013    CORONARY ARTERY BYPASS GRAFT  2013    X 2    FEMORAL ARTERY - POPLITEAL ARTERY BYPASS GRAFT      HEMORRHOID SURGERY      IR AORTAGRAM WITH RUN-OFF  11/19/2018    IR AORTAGRAM WITH RUN-OFF  03/02/2023    IR LOWER EXTREMITY ANGIOGRAM  03/23/2023    MOHS SURGERY Left 01/11/2023    SCC left superior helix-Dr Guy    DE SLCTV CATHJ 3RD+ ORD SLCTV ABDL PEL/LXTR BRNCH Left 08/12/2016    Procedure: LEFT FEMORAL ARTERIOGRAM; BALLOON ANGIOPLASTY; SFA  AND FEMORAL AT VEIN GRAFT;  Surgeon: Nicholas Urena MD;  Location: [facility];  Service: Vascular    DE TEAEC W/WO PATCH GRAFT COMMON FEMORAL Right 03/23/2023    Procedure: Common femoral endarterectomy&antegrade intervention of SFA, popliteal artery w/ shockwave;  Surgeon: Eagle Higuera MD;  Location: [facility];  Service: Vascular    DE TRANSCATHETER TRANSAPICAL REPLACEMT AORTIC VALVE N/A 06/14/2022    Procedure: REPLACEMENT AORTIC VALVE TRANSCATHETER (TAVR) TRANSAPICAL 29MM IRVIN KYLER S3 ULTRA VALVE(ACCESS ON LEFT) ELANA;  Surgeon: Amarjit Gordon DO;  Location: [facility];  Service: Cardiac Surgery    SKIN CANCER EXCISION      TONSILLECTOMY AND ADENOIDECTOMY      UPPER GASTROINTESTINAL ENDOSCOPY        Family History   Problem Relation Age of Onset    Heart attack Father     Other Sister         bypass and vlave replacement    Stroke Paternal Uncle     Arrhythmia Neg Hx     Asthma Neg Hx     Clotting disorder Neg Hx     Fainting Neg Hx     Anuerysm Neg Hx     Hypertension Neg Hx         unsure     Hyperlipidemia Neg Hx     Heart failure Neg Hx        Social History     Socioeconomic History    Marital status:      Spouse name: Not on file    Number of children: Not on file    Years of education: Not on file    Highest education level: Not on file   Occupational History    Not on file   Tobacco Use    Smoking status: Every Day     Current packs/day: 0.25     Average packs/day: 0.3 packs/day for 50.0 years (12.5 ttl pk-yrs)     Types: Cigarettes     Passive exposure: Current    Smokeless tobacco: Never   Vaping Use    Vaping status: Never Used   Substance and Sexual Activity    Alcohol use: Not Currently     Comment: rare    Drug use: No    Sexual activity: Not Currently   Other Topics Concern    Not on file   Social History Narrative    · Most recent tobacco use screenin2018      · Do you currently or have you served in the  [employer]:   Yes      · If Yes, What branch of service:   [employer]      · Occupation:   Dentists      · Exercise level:   Occasional        · Caffeine intake:   Heavy      · Marital status:         · Diet:   Regular  low fat     · Seat belts used routinely:   Yes      · Smoke alarm in home:   Yes      · Advance directive:   Yes      · General stress level:   Low      Social Determinants of Health     Financial Resource Strain: Low Risk  (2023)    Overall Financial Resource Strain (CARDIA)     Difficulty of Paying Living Expenses: Not very hard   Food Insecurity: No Food Insecurity (2024)    Hunger Vital Sign     Worried About Running Out of Food in the Last Year: Never true     Ran Out of Food in the Last Year: Never true   Transportation Needs: No Transportation Needs (2024)    PRAPARE - Transportation     Lack of Transportation (Medical): No     Lack of Transportation (Non-Medical): No   Physical Activity: Not on file   Stress: Not on file   Social Connections: Not on file   Intimate Partner Violence: Not on file   Housing Stability: Low Risk  (2024)    Housing Stability Vital Sign     Unable  "to Pay for Housing in the Last Year: No     Number of Places Lived in the Last Year: 1     Unstable Housing in the Last Year: No      Review of Systems   Constitutional:  Negative for chills, fever and unexpected weight change.   HENT:  Negative for rhinorrhea and sore throat.    Respiratory:  Positive for cough. Negative for shortness of breath.    Neurological:  Negative for headaches.         Objective:   Physical Exam  Constitutional:       General: He is not in acute distress.  HENT:      Head: Normocephalic and atraumatic.      Nose: Nose normal.   Eyes:      Extraocular Movements: Extraocular movements intact.      Comments: Wears glasses   Cardiovascular:      Rate and Rhythm: Normal rate and regular rhythm.   Pulmonary:      Effort: Pulmonary effort is normal.      Breath sounds: Normal breath sounds.   Abdominal:      Palpations: Abdomen is soft.      Tenderness: There is no abdominal tenderness.   Musculoskeletal:      Right lower leg: No edema.      Left lower leg: No edema.   Skin:     General: Skin is warm and dry.     /55 (BP Location: Left arm, Patient Position: Sitting, Cuff Size: Standard)   Pulse (!) 53   Temp 98 °F (36.7 °C) (Temporal)   Resp 14   Ht 5' 10\" (1.778 m)   Wt 79.3 kg (174 lb 13.2 oz)   SpO2 98%   BMI 25.08 kg/m²     No Chest XR results available for this patient.   CT chest wo contrast    Result Date: 3/5/2024  Narrative CT CHEST WITHOUT IV CONTRAST INDICATION: R91.8: Other nonspecific abnormal finding of lung field. COMPARISON: CT abdomen and pelvis December 22, 2023, CT chest October 4, 2022, April 2021 TECHNIQUE: CT examination of the chest was performed without intravenous contrast. Multiplanar 2D reformatted images were created from the source data. This examination, like all CT scans performed in the [facility], was performed utilizing techniques to minimize radiation dose exposure, including the use of iterative reconstruction and automated " exposure control. Radiation dose length product (DLP) for this visit: 276 mGy-cm FINDINGS: LUNGS: Severe pulmonary emphysema with scattered granulomata. Irregular nodular opacity is demonstrated in the left lower lobe series 3 image 156, enlarged compared to the previous exam with a mean diameter of 9 mm. Enlargement of the nodule raises concern for malignancy. There was a small nodular density at this level on the previous exam. Mild reticular interstitial thickening at the lung bases right more than left is stable. Linear density right upper lobe 3/38 suggesting an area of scarring. There is no tracheal or endobronchial lesion. PLEURA: Unremarkable. HEART/GREAT VESSELS: Heart is unremarkable for patient's age. No interval change in high density material at the cardiac apex 2/85. No thoracic aortic aneurysm. TAVR. Median sternotomy. Extensive coronary calcifications. MEDIASTINUM AND MICHAEL: Unremarkable. CHEST WALL AND LOWER NECK: Unremarkable. VISUALIZED STRUCTURES IN THE UPPER ABDOMEN: Atherosclerotic disease of the abdominal aorta and its branches. Calcifications and atrophy of the visualized portions of the pancreas. OSSEOUS STRUCTURES: No acute fracture or destructive osseous lesion. Flowing osteophytes are demonstrated at the level of the thoracic spine. There is stable sclerosis involving the T2 vertebral body with posterior spinous process fusion.     Impression 1. Enlargement of the left lower lobe lung nodule which now has a diameter of 9 mm. Indeterminate solid pulmonary nodule measuring 9 mm. In a patient of unknown risk level with a solid nodule >8 mm, consider PET/CT or tissue sampling, vs. CT at 3 months. Juan Luis CARDOZA, et al. Guidelines for Management of Incidental Pulmonary Nodules Detected on CT Images: From the Fleischner Society 2017. Radiology. 2017 Jul;284(1):228-243. The study was marked in EPIC for significant notification and additional evaluation. Workstation performed: [id number]     No CT  Chest,Abdomen,Pelvis results available for this patient.   No NM PET CT results available for this patient.   No Barium Swallow results available for this patient.

## 2024-03-21 NOTE — ASSESSMENT & PLAN NOTE
CT scan was reviewed with findings of a 9mm left lower lobe pulmonary nodule. This was compared to patient's CT scan from 2022. This nodule appears to be slowly enlarging. Patient has previous PFTs completed in 2022 that were reviewed. Dr. Schofield is recommending patient undergo a PET CT scan and IR biopsy. Discussed what this imaging and biopsy entails. Ideally the PET CT scan would be completed before the biopsy. Patient will return in follow up after PET CT scan and biopsy is completed. There was a brief discussion of treatment options if this does represent a cancer. Encouraged smoking cessation. Patient declined referral to smoking cessation program at this time. All questions answered. Patient in agreement with plan.

## 2024-03-22 DIAGNOSIS — R91.8 MASS OF LEFT LUNG: Primary | ICD-10-CM

## 2024-03-22 RX ORDER — SODIUM CHLORIDE 9 MG/ML
75 INJECTION, SOLUTION INTRAVENOUS CONTINUOUS
OUTPATIENT
Start: 2024-03-22

## 2024-04-03 ENCOUNTER — OFFICE VISIT (OUTPATIENT)
Dept: VASCULAR SURGERY | Facility: CLINIC | Age: 79
End: 2024-04-03
Payer: MEDICARE

## 2024-04-03 VITALS
BODY MASS INDEX: 25.05 KG/M2 | HEART RATE: 54 BPM | WEIGHT: 175 LBS | SYSTOLIC BLOOD PRESSURE: 162 MMHG | HEIGHT: 70 IN | DIASTOLIC BLOOD PRESSURE: 64 MMHG | RESPIRATION RATE: 18 BRPM

## 2024-04-03 DIAGNOSIS — I73.9 PVD (PERIPHERAL VASCULAR DISEASE) (HCC): ICD-10-CM

## 2024-04-03 DIAGNOSIS — I65.23 ASYMPTOMATIC BILATERAL CAROTID ARTERY STENOSIS: Chronic | ICD-10-CM

## 2024-04-03 DIAGNOSIS — F17.219 CIGARETTE NICOTINE DEPENDENCE WITH NICOTINE-INDUCED DISORDER: ICD-10-CM

## 2024-04-03 DIAGNOSIS — T82.858D STENOSIS OF NONCORONARY BYPASS GRAFT, SUBSEQUENT ENCOUNTER: Primary | ICD-10-CM

## 2024-04-03 DIAGNOSIS — I70.1 RENAL ARTERY STENOSIS, NATIVE, BILATERAL (HCC): Chronic | ICD-10-CM

## 2024-04-03 DIAGNOSIS — N18.4 CKD (CHRONIC KIDNEY DISEASE) STAGE 4, GFR 15-29 ML/MIN (HCC): ICD-10-CM

## 2024-04-03 PROBLEM — I70.213 ATHEROSCLEROSIS OF NATIVE ARTERIES OF EXTREMITIES WITH INTERMITTENT CLAUDICATION, BILATERAL LEGS (HCC): Status: ACTIVE | Noted: 2023-02-24

## 2024-04-03 PROBLEM — Z99.11 DEPENDENCE ON RESPIRATOR (VENTILATOR) STATUS (HCC): Status: RESOLVED | Noted: 2023-04-07 | Resolved: 2024-04-03

## 2024-04-03 PROBLEM — I70.213 ATHEROSCLEROSIS OF NATIVE ARTERIES OF EXTREMITIES WITH INTERMITTENT CLAUDICATION, BILATERAL LEGS (HCC): Status: ACTIVE | Noted: 2024-02-21

## 2024-04-03 PROCEDURE — 99214 OFFICE O/P EST MOD 30 MIN: CPT | Performed by: SURGERY

## 2024-04-03 NOTE — PROGRESS NOTES
Assessment/Plan:    Atherosclerosis of native arteries of extremities with intermittent claudication, bilateral legs ([facility])  >>ASSESSMENT AND PLAN FOR PVD (PERIPHERAL VASCULAR DISEASE) ([facility]) WRITTEN ON 4/3/2024 10:12 PM BY MICHAELLE CASTILLO MD    Chronic numbness  Doppler shows last intervention on right SFA has some restenosis but he is very high risk candidate due to CKD4.    He is undergoing investigation for lung nodule so will continue with serial dopplers and follow up in 6 months.      Stenosis of noncoronary bypass graft ([facility])  78 y/o HTN, HLD, DM, CAD, CABG, AS, bilateral asymptomatic carotid stenosis, PAD status post left fem to anterior tibial bypass by Dr Urena '15, s/p  Angioplasty to bypass graft stenoses '16, recurrent bypass graft stenosis  CKD 4  Continues to smoke    Will continue to manage with serial dopplers.  High risk for intervention due to CKD4    Renal artery stenosis, native, bilateral ([facility])  High risk of intervnetion due to CKD4  Continue to monitor    Asymptomatic bilateral carotid artery stenosis  >70% stenosis ongoing for several years, progressively worsening as he continues to smoke.  Reviewed older CTA, he could be a candidate for TCAR.  However we will address this after his lung biopsy and once we have a clear idea about prognosis.    Continue plavix and aspirin.    Ok to hold plavix for 7 days pre lung biopsy.    CKD (chronic kidney disease) stage 4, GFR 15-29 ml/min ([facility])  Lab Results   Component Value Date    EGFR 26 03/05/2024    EGFR 27 02/09/2024    EGFR 22 01/23/2024    CREATININE 2.24 (H) 03/05/2024    CREATININE 2.18 (H) 02/09/2024    CREATININE 2.62 (H) 01/23/2024   High risk with contrast exposure    Cigarette nicotine dependence with nicotine-induced disorder  Tobacco use is a significant patient-modifiable risk factor for this patient’s vascular disease with multiple vascular comorbidities, and a significant risk factor for failure of and complications from any  endovascular or surgical interventions.    I explained to the patient the effects of smoking including peripheral artery disease, coronary artery disease, cerebrovascular disease as well as cancer and chronic obstructive pulmonary disease. I asked the patient to stop smoking immediately. It is never too late to quit, and many studies show significant health benefits as well as economical savings after smoking cessation. I offered to the patient nicotine replacement therapy as well as referral to the smoking cessation program and access to the quit line 4-635-SDVXYNJ or ambulatory referral to our network smoking cessation program.  He is refusing to accept referral.    Based on our conversation, this patient does not appear ready to quit    And declined my offer of nicotine replacement or tobacco cessation medications    The patient did not set a quit date. I will continue to  follow up on this issue at our next scheduled visit.     He says damage is already done, I will not quit smoking.    I spent approximately 10 minutes on tobacco cessation counseling with this patient.           Diagnoses and all orders for this visit:    Stenosis of noncoronary bypass graft, subsequent encounter    PVD (peripheral vascular disease) (HCC)    Renal artery stenosis, native, bilateral (HCC)    Asymptomatic bilateral carotid artery stenosis    CKD (chronic kidney disease) stage 4, GFR 15-29 ml/min (HCC)    Cigarette nicotine dependence with nicotine-induced disorder          Subjective:      Patient ID: Jay Roach Jr. is a 79 y.o. male.    HPI  Patient presents to the office to discuss intervention. Pt c/o numbness, tinging, and pain in feet when walking. Pt has to rest for a few minute after walking 1-2 blocks. Pt denies rest pain or non-healing wounds. Pt smokes 1 ppd.     The following portions of the patient's history were reviewed and updated as appropriate: allergies, current medications, past family history, past  "medical history, past social history, past surgical history, and problem list.    Review of Systems   Constitutional: Negative.    HENT: Negative.     Eyes: Negative.    Respiratory: Negative.     Cardiovascular: Negative.    Gastrointestinal: Negative.    Endocrine: Negative.    Genitourinary: Negative.    Musculoskeletal:  Positive for arthralgias and back pain.   Skin: Negative.    Allergic/Immunologic: Negative.    Neurological:  Positive for numbness (feet). Negative for dizziness and weakness.        Tingling in feet   Hematological: Negative.    Psychiatric/Behavioral: Negative.       I have reviewed the ROS as entered and made changes as necessary.      Objective:      /64 (BP Location: Left arm, Patient Position: Sitting)   Pulse (!) 54   Resp 18   Ht 5' 10\" (1.778 m)   Wt 79.4 kg (175 lb)   BMI 25.11 kg/m²          Physical Exam  Vitals and nursing note reviewed.   Constitutional:       Appearance: Normal appearance. He is ill-appearing.   HENT:      Head: Normocephalic and atraumatic.   Cardiovascular:      Rate and Rhythm: Normal rate and regular rhythm.      Pulses:           Dorsalis pedis pulses are detected w/ Doppler on the right side and detected w/ Doppler on the left side.      Comments: Triphasic AT signals on left and biphasic AT signal on right  No ulcers on feet.  Abdominal:      Comments: Right groin incision has healed well.   Musculoskeletal:      Right lower leg: No edema.      Left lower leg: No edema.   Skin:     General: Skin is warm and dry.      Capillary Refill: Capillary refill takes less than 2 seconds.   Neurological:      General: No focal deficit present.      Mental Status: He is alert and oriented to person, place, and time.   Psychiatric:         Mood and Affect: Mood normal.         Behavior: Behavior normal.           "

## 2024-04-04 NOTE — ASSESSMENT & PLAN NOTE
Tobacco use is a significant patient-modifiable risk factor for this patient’s vascular disease with multiple vascular comorbidities, and a significant risk factor for failure of and complications from any endovascular or surgical interventions.    I explained to the patient the effects of smoking including peripheral artery disease, coronary artery disease, cerebrovascular disease as well as cancer and chronic obstructive pulmonary disease. I asked the patient to stop smoking immediately. It is never too late to quit, and many studies show significant health benefits as well as economical savings after smoking cessation. I offered to the patient nicotine replacement therapy as well as referral to the smoking cessation program and access to the quit line 4-732-WCQEGXN or ambulatory referral to our network smoking cessation program.  He is refusing to accept referral.    Based on our conversation, this patient does not appear ready to quit    And declined my offer of nicotine replacement or tobacco cessation medications    The patient did not set a quit date. I will continue to  follow up on this issue at our next scheduled visit.     He says damage is already done, I will not quit smoking.    I spent approximately 10 minutes on tobacco cessation counseling with this patient.

## 2024-04-04 NOTE — ASSESSMENT & PLAN NOTE
>>ASSESSMENT AND PLAN FOR PVD (PERIPHERAL VASCULAR DISEASE) (HCC) WRITTEN ON 4/3/2024 10:12 PM BY MICHAELLE CASTILLO MD    Chronic numbness  Doppler shows last intervention on right SFA has some restenosis but he is very high risk candidate due to CKD4.    He is undergoing investigation for lung nodule so will continue with serial dopplers and follow up in 6 months.

## 2024-04-04 NOTE — ASSESSMENT & PLAN NOTE
>70% stenosis ongoing for several years, progressively worsening as he continues to smoke.  Reviewed older CTA, he could be a candidate for TCAR.  However we will address this after his lung biopsy and once we have a clear idea about prognosis.    Continue plavix and aspirin.    Ok to hold plavix for 7 days pre lung biopsy.   [facility]  *** FINAL REPORT ***    Name: Jessica Looney  MRN: [id number]    Inpatient  : 1933  HIS Order #: [id number]  [address] [facility] Visit #: [id number]  Date: 2018    TYPE OF TEST: Peripheral Venous Testing    REASON FOR TEST  Pain in limb, Limb swelling    Right Leg:-  Deep venous thrombosis:           No  Superficial venous thrombosis:    No  Deep venous insufficiency:        Not examined  Superficial venous insufficiency: Not examined    Left Leg:-  Deep venous thrombosis:           No  Superficial venous thrombosis:    No  Deep venous insufficiency:        Not examined  Superficial venous insufficiency: Not examined      INTERPRETATION/FINDINGS  PROCEDURE:  Color duplex ultrasound imaging of lower extremity veins. FINDINGS:       Right: The common femoral, deep femoral, femoral, popliteal,  posterior tibial, peroneal, and great saphenous are patent and without   evidence of thrombus;  each is fully compressible and there is no  narrowing of the flow channel on color Doppler imaging. Phasic flow  is observed in the common femoral vein. Left:   The common femoral, deep femoral, femoral, popliteal,  posterior tibial, peroneal, and great saphenous are patent and without   evidence of thrombus;  each is fully compressible and there is no  narrowing of the flow channel on color Doppler imaging. Phasic flow  is observed in the common femoral vein. There is an incidental finding  of a popliteal fossa fluid collection. IMPRESSION:  No evidence of right or left lower extremity vein  thrombosis. ADDITIONAL COMMENTS    I have personally reviewed the data relevant to the interpretation of  this  study. TECHNOLOGIST: Jah Bloom.  Ashley Palmer  Signed: 2018 04:23 PM    PHYSICIAN: Brendon Bose MD  Signed: 2018 05:49 PM

## 2024-04-04 NOTE — ASSESSMENT & PLAN NOTE
Lab Results   Component Value Date    EGFR 26 03/05/2024    EGFR 27 02/09/2024    EGFR 22 01/23/2024    CREATININE 2.24 (H) 03/05/2024    CREATININE 2.18 (H) 02/09/2024    CREATININE 2.62 (H) 01/23/2024   High risk with contrast exposure

## 2024-04-04 NOTE — ASSESSMENT & PLAN NOTE
Chronic numbness  Doppler shows last intervention on right SFA has some restenosis but he is very high risk candidate due to CKD4.    He is undergoing investigation for lung nodule so will continue with serial dopplers and follow up in 6 months.

## 2024-04-04 NOTE — ASSESSMENT & PLAN NOTE
78 y/o HTN, HLD, DM, CAD, CABG, AS, bilateral asymptomatic carotid stenosis, PAD status post left fem to anterior tibial bypass by Dr Urena '15, s/p  Angioplasty to bypass graft stenoses '16, recurrent bypass graft stenosis  CKD 4  Continues to smoke    Will continue to manage with serial dopplers.  High risk for intervention due to CKD4

## 2024-04-10 ENCOUNTER — TELEPHONE (OUTPATIENT)
Dept: RADIOLOGY | Facility: HOSPITAL | Age: 79
End: 2024-04-10

## 2024-04-10 NOTE — TELEPHONE ENCOUNTER
Pt called twice today to find out when he would need to start holding his Plavix for his procedure on 4/17. I sent a message to the campus and they stated they would give him a call when they could. Jay called back concerned that his bx might be cancelled if he doesn't stop his Plavix. Sent a message to the campus

## 2024-04-11 ENCOUNTER — HOSPITAL ENCOUNTER (OUTPATIENT)
Dept: RADIOLOGY | Age: 79
Discharge: HOME | End: 2024-04-11
Payer: MEDICARE

## 2024-04-11 DIAGNOSIS — R91.8 MASS OF LEFT LUNG: ICD-10-CM

## 2024-04-11 DIAGNOSIS — D38.1 NEOPLASM OF UNCERTAIN BEHAVIOR OF LEFT LOWER LOBE OF LUNG: ICD-10-CM

## 2024-04-11 LAB — GLUCOSE SERPL-MCNC: 104 MG/DL (ref 65–140)

## 2024-04-11 PROCEDURE — 82948 REAGENT STRIP/BLOOD GLUCOSE: CPT

## 2024-04-11 PROCEDURE — A9552 F18 FDG: HCPCS

## 2024-04-11 PROCEDURE — 78815 PET IMAGE W/CT SKULL-THIGH: CPT

## 2024-04-12 ENCOUNTER — TELEPHONE (OUTPATIENT)
Dept: HEMATOLOGY ONCOLOGY | Facility: CLINIC | Age: 79
End: 2024-04-12

## 2024-04-12 NOTE — TELEPHONE ENCOUNTER
Patient Call    Who are you speaking with? [facility] Radiology     If it is not the patient, are they listed on an active communication consent form? N/A   What is the reason for this call? Rhonda from radiology is calling with significant findings on a PET scan done on 4/11/24. Report is in Epic and ready to view.   Does this require a call back? No   If a call back is required, please list best call back number N/A   If a call back is required, advise that a message will be forwarded to their care team and someone will return their call as soon as possible.   Did you relay this information to the patient? N/A

## 2024-04-12 NOTE — PRE-PROCEDURE INSTRUCTIONS
Voicemail left for pt with an arrival time of 9am on 4/17 at [facility] for his procedure with IR. Instructed to have nothing to eat or drink after midnight, to have a ride home after the procedure and to start holding his asa and plavix 4/13.

## 2024-04-17 ENCOUNTER — HOSPITAL ENCOUNTER (INPATIENT)
Facility: HOSPITAL | Age: 79
LOS: 2 days | Discharge: HOME | DRG: 205 | End: 2024-04-19
Attending: EMERGENCY MEDICINE | Admitting: STUDENT IN AN ORGANIZED HEALTH CARE EDUCATION/TRAINING PROGRAM
Payer: MEDICARE

## 2024-04-17 ENCOUNTER — HOSPITAL ENCOUNTER (OUTPATIENT)
Dept: CT IMAGING | Facility: HOSPITAL | Age: 79
Discharge: HOME | DRG: 205 | End: 2024-04-17
Attending: RADIOLOGY
Payer: MEDICARE

## 2024-04-17 VITALS
HEIGHT: 70 IN | HEART RATE: 62 BPM | WEIGHT: 177.03 LBS | TEMPERATURE: 97.1 F | DIASTOLIC BLOOD PRESSURE: 56 MMHG | BODY MASS INDEX: 25.34 KG/M2 | OXYGEN SATURATION: 93 % | RESPIRATION RATE: 18 BRPM | SYSTOLIC BLOOD PRESSURE: 116 MMHG

## 2024-04-17 DIAGNOSIS — R04.2 HEMOPTYSIS: Primary | ICD-10-CM

## 2024-04-17 DIAGNOSIS — R91.8 MASS OF LEFT LUNG: ICD-10-CM

## 2024-04-17 DIAGNOSIS — R09.02 HYPOXIA: ICD-10-CM

## 2024-04-17 DIAGNOSIS — I50.32 CHRONIC HEART FAILURE WITH PRESERVED EJECTION FRACTION (HCC): ICD-10-CM

## 2024-04-17 DIAGNOSIS — R06.00 DYSPNEA: ICD-10-CM

## 2024-04-17 PROBLEM — J95.89: Status: ACTIVE | Noted: 2024-04-17

## 2024-04-17 LAB
ABO GROUP BLD: NORMAL
ALBUMIN SERPL BCP-MCNC: 3.6 G/DL (ref 3.5–5)
ALP SERPL-CCNC: 70 U/L (ref 34–104)
ALT SERPL W P-5'-P-CCNC: 6 U/L (ref 7–52)
ANION GAP SERPL CALCULATED.3IONS-SCNC: 7 MMOL/L (ref 4–13)
APTT PPP: 27 SECONDS (ref 23–37)
AST SERPL W P-5'-P-CCNC: 11 U/L (ref 13–39)
BASOPHILS # BLD AUTO: 0.02 THOUSANDS/ÂΜL (ref 0–0.1)
BASOPHILS # BLD AUTO: 0.03 THOUSANDS/ÂΜL (ref 0–0.1)
BASOPHILS NFR BLD AUTO: 0 % (ref 0–1)
BASOPHILS NFR BLD AUTO: 0 % (ref 0–1)
BILIRUB SERPL-MCNC: 0.5 MG/DL (ref 0.2–1)
BLD GP AB SCN SERPL QL: NEGATIVE
BUN SERPL-MCNC: 39 MG/DL (ref 5–25)
CALCIUM SERPL-MCNC: 8.7 MG/DL (ref 8.4–10.2)
CHLORIDE SERPL-SCNC: 109 MMOL/L (ref 96–108)
CO2 SERPL-SCNC: 26 MMOL/L (ref 21–32)
CREAT SERPL-MCNC: 2.38 MG/DL (ref 0.6–1.3)
EOSINOPHIL # BLD AUTO: 0.39 THOUSAND/ÂΜL (ref 0–0.61)
EOSINOPHIL # BLD AUTO: 0.42 THOUSAND/ÂΜL (ref 0–0.61)
EOSINOPHIL NFR BLD AUTO: 6 % (ref 0–6)
EOSINOPHIL NFR BLD AUTO: 6 % (ref 0–6)
ERYTHROCYTE [DISTWIDTH] IN BLOOD BY AUTOMATED COUNT: 15.2 % (ref 11.6–15.1)
ERYTHROCYTE [DISTWIDTH] IN BLOOD BY AUTOMATED COUNT: 15.4 % (ref 11.6–15.1)
GFR SERPL CREATININE-BSD FRML MDRD: 24 ML/MIN/1.73SQ M
GLUCOSE SERPL-MCNC: 143 MG/DL (ref 65–140)
GLUCOSE SERPL-MCNC: 160 MG/DL (ref 65–140)
HCT VFR BLD AUTO: 26.1 % (ref 36.5–49.3)
HCT VFR BLD AUTO: 29 % (ref 36.5–49.3)
HCT VFR BLD AUTO: 29.8 % (ref 36.5–49.3)
HCT VFR BLD AUTO: 34.8 % (ref 36.5–49.3)
HGB BLD-MCNC: 11.2 G/DL (ref 12–17)
HGB BLD-MCNC: 8.5 G/DL (ref 12–17)
HGB BLD-MCNC: 9.4 G/DL (ref 12–17)
HGB BLD-MCNC: 9.7 G/DL (ref 12–17)
IMM GRANULOCYTES # BLD AUTO: 0.02 THOUSAND/UL (ref 0–0.2)
IMM GRANULOCYTES # BLD AUTO: 0.02 THOUSAND/UL (ref 0–0.2)
IMM GRANULOCYTES NFR BLD AUTO: 0 % (ref 0–2)
IMM GRANULOCYTES NFR BLD AUTO: 0 % (ref 0–2)
INR PPP: 0.95 (ref 0.84–1.19)
INR PPP: 1.01 (ref 0.84–1.19)
LYMPHOCYTES # BLD AUTO: 1.22 THOUSANDS/ÂΜL (ref 0.6–4.47)
LYMPHOCYTES # BLD AUTO: 1.68 THOUSANDS/ÂΜL (ref 0.6–4.47)
LYMPHOCYTES NFR BLD AUTO: 18 % (ref 14–44)
LYMPHOCYTES NFR BLD AUTO: 25 % (ref 14–44)
MCH RBC QN AUTO: 29.6 PG (ref 26.8–34.3)
MCH RBC QN AUTO: 30.3 PG (ref 26.8–34.3)
MCHC RBC AUTO-ENTMCNC: 32.2 G/DL (ref 31.4–37.4)
MCHC RBC AUTO-ENTMCNC: 32.6 G/DL (ref 31.4–37.4)
MCV RBC AUTO: 92 FL (ref 82–98)
MCV RBC AUTO: 93 FL (ref 82–98)
MONOCYTES # BLD AUTO: 0.49 THOUSAND/ÂΜL (ref 0.17–1.22)
MONOCYTES # BLD AUTO: 0.55 THOUSAND/ÂΜL (ref 0.17–1.22)
MONOCYTES NFR BLD AUTO: 7 % (ref 4–12)
MONOCYTES NFR BLD AUTO: 8 % (ref 4–12)
NEUTROPHILS # BLD AUTO: 4.25 THOUSANDS/ÂΜL (ref 1.85–7.62)
NEUTROPHILS # BLD AUTO: 4.44 THOUSANDS/ÂΜL (ref 1.85–7.62)
NEUTS SEG NFR BLD AUTO: 62 % (ref 43–75)
NEUTS SEG NFR BLD AUTO: 68 % (ref 43–75)
NRBC BLD AUTO-RTO: 0 /100 WBCS
NRBC BLD AUTO-RTO: 0 /100 WBCS
PLATELET # BLD AUTO: 151 THOUSANDS/UL (ref 149–390)
PLATELET # BLD AUTO: 160 THOUSANDS/UL (ref 149–390)
PMV BLD AUTO: 10.5 FL (ref 8.9–12.7)
PMV BLD AUTO: 11.1 FL (ref 8.9–12.7)
POTASSIUM SERPL-SCNC: 4.3 MMOL/L (ref 3.5–5.3)
PROT SERPL-MCNC: 6.3 G/DL (ref 6.4–8.4)
PROTHROMBIN TIME: 13.3 SECONDS (ref 11.6–14.5)
PROTHROMBIN TIME: 13.9 SECONDS (ref 11.6–14.5)
RBC # BLD AUTO: 3.2 MILLION/UL (ref 3.88–5.62)
RBC # BLD AUTO: 3.79 MILLION/UL (ref 3.88–5.62)
RH BLD: POSITIVE
SODIUM SERPL-SCNC: 142 MMOL/L (ref 135–147)
SPECIMEN EXPIRATION DATE: NORMAL
WBC # BLD AUTO: 6.67 THOUSAND/UL (ref 4.31–10.16)
WBC # BLD AUTO: 6.86 THOUSAND/UL (ref 4.31–10.16)

## 2024-04-17 PROCEDURE — 82948 REAGENT STRIP/BLOOD GLUCOSE: CPT

## 2024-04-17 PROCEDURE — 93005 ELECTROCARDIOGRAM TRACING: CPT

## 2024-04-17 PROCEDURE — 88342 IMHCHEM/IMCYTCHM 1ST ANTB: CPT | Performed by: PATHOLOGY

## 2024-04-17 PROCEDURE — 96361 HYDRATE IV INFUSION ADD-ON: CPT

## 2024-04-17 PROCEDURE — 0BBJ3ZX EXCISION OF LEFT LOWER LUNG LOBE, PERCUTANEOUS APPROACH, DIAGNOSTIC: ICD-10-PCS | Performed by: STUDENT IN AN ORGANIZED HEALTH CARE EDUCATION/TRAINING PROGRAM

## 2024-04-17 PROCEDURE — 85014 HEMATOCRIT: CPT | Performed by: PHYSICIAN ASSISTANT

## 2024-04-17 PROCEDURE — 88305 TISSUE EXAM BY PATHOLOGIST: CPT | Performed by: PATHOLOGY

## 2024-04-17 PROCEDURE — 99223 1ST HOSP IP/OBS HIGH 75: CPT | Performed by: STUDENT IN AN ORGANIZED HEALTH CARE EDUCATION/TRAINING PROGRAM

## 2024-04-17 PROCEDURE — 86901 BLOOD TYPING SEROLOGIC RH(D): CPT

## 2024-04-17 PROCEDURE — 99285 EMERGENCY DEPT VISIT HI MDM: CPT

## 2024-04-17 PROCEDURE — 36415 COLL VENOUS BLD VENIPUNCTURE: CPT

## 2024-04-17 PROCEDURE — 86920 COMPATIBILITY TEST SPIN: CPT

## 2024-04-17 PROCEDURE — 99152 MOD SED SAME PHYS/QHP 5/>YRS: CPT | Performed by: STUDENT IN AN ORGANIZED HEALTH CARE EDUCATION/TRAINING PROGRAM

## 2024-04-17 PROCEDURE — 96360 HYDRATION IV INFUSION INIT: CPT

## 2024-04-17 PROCEDURE — 80053 COMPREHEN METABOLIC PANEL: CPT

## 2024-04-17 PROCEDURE — 85025 COMPLETE CBC W/AUTO DIFF WBC: CPT

## 2024-04-17 PROCEDURE — 99232 SBSQ HOSP IP/OBS MODERATE 35: CPT | Performed by: ANESTHESIOLOGY

## 2024-04-17 PROCEDURE — 86900 BLOOD TYPING SEROLOGIC ABO: CPT

## 2024-04-17 PROCEDURE — 85025 COMPLETE CBC W/AUTO DIFF WBC: CPT | Performed by: STUDENT IN AN ORGANIZED HEALTH CARE EDUCATION/TRAINING PROGRAM

## 2024-04-17 PROCEDURE — 85018 HEMOGLOBIN: CPT | Performed by: PHYSICIAN ASSISTANT

## 2024-04-17 PROCEDURE — 88333 PATH CONSLTJ SURG CYTO XM 1: CPT | Performed by: PATHOLOGY

## 2024-04-17 PROCEDURE — 85610 PROTHROMBIN TIME: CPT | Performed by: RADIOLOGY

## 2024-04-17 PROCEDURE — 85730 THROMBOPLASTIN TIME PARTIAL: CPT

## 2024-04-17 PROCEDURE — 32408 CORE NDL BX LNG/MED PERQ: CPT | Performed by: STUDENT IN AN ORGANIZED HEALTH CARE EDUCATION/TRAINING PROGRAM

## 2024-04-17 PROCEDURE — 85610 PROTHROMBIN TIME: CPT

## 2024-04-17 PROCEDURE — 86850 RBC ANTIBODY SCREEN: CPT

## 2024-04-17 PROCEDURE — 88341 IMHCHEM/IMCYTCHM EA ADD ANTB: CPT | Performed by: PATHOLOGY

## 2024-04-17 RX ORDER — TRANEXAMIC ACID 100 MG/ML
1000 INJECTION, SOLUTION INTRAVENOUS ONCE
Status: COMPLETED | OUTPATIENT
Start: 2024-04-17 | End: 2024-04-17

## 2024-04-17 RX ORDER — ACETAMINOPHEN 325 MG/1
650 TABLET ORAL EVERY 6 HOURS PRN
Status: DISCONTINUED | OUTPATIENT
Start: 2024-04-17 | End: 2024-04-19 | Stop reason: HOSPADM

## 2024-04-17 RX ORDER — MAGNESIUM HYDROXIDE/ALUMINUM HYDROXICE/SIMETHICONE 120; 1200; 1200 MG/30ML; MG/30ML; MG/30ML
30 SUSPENSION ORAL EVERY 6 HOURS PRN
Status: DISCONTINUED | OUTPATIENT
Start: 2024-04-17 | End: 2024-04-19 | Stop reason: HOSPADM

## 2024-04-17 RX ORDER — AMLODIPINE BESYLATE 10 MG/1
10 TABLET ORAL DAILY
Status: DISCONTINUED | OUTPATIENT
Start: 2024-04-18 | End: 2024-04-19 | Stop reason: HOSPADM

## 2024-04-17 RX ORDER — ONDANSETRON 2 MG/ML
4 INJECTION INTRAMUSCULAR; INTRAVENOUS EVERY 6 HOURS PRN
Status: DISCONTINUED | OUTPATIENT
Start: 2024-04-17 | End: 2024-04-19 | Stop reason: HOSPADM

## 2024-04-17 RX ORDER — MIDAZOLAM HYDROCHLORIDE 2 MG/2ML
INJECTION, SOLUTION INTRAMUSCULAR; INTRAVENOUS AS NEEDED
Status: COMPLETED | OUTPATIENT
Start: 2024-04-17 | End: 2024-04-17

## 2024-04-17 RX ORDER — TORSEMIDE 20 MG/1
20 TABLET ORAL DAILY
Status: DISCONTINUED | OUTPATIENT
Start: 2024-04-18 | End: 2024-04-19 | Stop reason: HOSPADM

## 2024-04-17 RX ORDER — METOPROLOL SUCCINATE 25 MG/1
12.5 TABLET, EXTENDED RELEASE ORAL DAILY
Status: DISCONTINUED | OUTPATIENT
Start: 2024-04-18 | End: 2024-04-19 | Stop reason: HOSPADM

## 2024-04-17 RX ORDER — CALCITRIOL 0.25 UG/1
0.25 CAPSULE, LIQUID FILLED ORAL DAILY
Status: DISCONTINUED | OUTPATIENT
Start: 2024-04-18 | End: 2024-04-19 | Stop reason: HOSPADM

## 2024-04-17 RX ORDER — ALLOPURINOL 300 MG/1
300 TABLET ORAL DAILY
Status: DISCONTINUED | OUTPATIENT
Start: 2024-04-18 | End: 2024-04-19 | Stop reason: HOSPADM

## 2024-04-17 RX ORDER — FENTANYL CITRATE 50 UG/ML
INJECTION, SOLUTION INTRAMUSCULAR; INTRAVENOUS AS NEEDED
Status: COMPLETED | OUTPATIENT
Start: 2024-04-17 | End: 2024-04-17

## 2024-04-17 RX ORDER — ATORVASTATIN CALCIUM 40 MG/1
80 TABLET, FILM COATED ORAL
Status: DISCONTINUED | OUTPATIENT
Start: 2024-04-17 | End: 2024-04-19 | Stop reason: HOSPADM

## 2024-04-17 RX ORDER — HYDRALAZINE HYDROCHLORIDE 25 MG/1
25 TABLET, FILM COATED ORAL 2 TIMES DAILY
Status: DISCONTINUED | OUTPATIENT
Start: 2024-04-17 | End: 2024-04-19 | Stop reason: HOSPADM

## 2024-04-17 RX ORDER — POLYETHYLENE GLYCOL 3350 17 G/17G
17 POWDER, FOR SOLUTION ORAL DAILY PRN
Status: DISCONTINUED | OUTPATIENT
Start: 2024-04-17 | End: 2024-04-19 | Stop reason: HOSPADM

## 2024-04-17 RX ORDER — PANTOPRAZOLE SODIUM 40 MG/1
40 TABLET, DELAYED RELEASE ORAL 2 TIMES DAILY
Status: DISCONTINUED | OUTPATIENT
Start: 2024-04-17 | End: 2024-04-19 | Stop reason: HOSPADM

## 2024-04-17 RX ORDER — SODIUM CHLORIDE 9 MG/ML
75 INJECTION, SOLUTION INTRAVENOUS CONTINUOUS
Status: DISCONTINUED | OUTPATIENT
Start: 2024-04-17 | End: 2024-04-21 | Stop reason: HOSPADM

## 2024-04-17 RX ADMIN — FENTANYL CITRATE 25 MCG: 50 INJECTION, SOLUTION INTRAMUSCULAR; INTRAVENOUS at 10:58

## 2024-04-17 RX ADMIN — ATORVASTATIN CALCIUM 80 MG: 40 TABLET, FILM COATED ORAL at 17:29

## 2024-04-17 RX ADMIN — FENTANYL CITRATE 50 MCG: 50 INJECTION, SOLUTION INTRAMUSCULAR; INTRAVENOUS at 10:39

## 2024-04-17 RX ADMIN — MIDAZOLAM HYDROCHLORIDE 1 MG: 1 INJECTION, SOLUTION INTRAMUSCULAR; INTRAVENOUS at 10:39

## 2024-04-17 RX ADMIN — SODIUM CHLORIDE 1000 ML: 0.9 INJECTION, SOLUTION INTRAVENOUS at 11:39

## 2024-04-17 RX ADMIN — PANCRELIPASE 24000 UNITS: 24000; 76000; 120000 CAPSULE, DELAYED RELEASE PELLETS ORAL at 17:29

## 2024-04-17 RX ADMIN — HYDRALAZINE HYDROCHLORIDE 25 MG: 25 TABLET ORAL at 20:32

## 2024-04-17 RX ADMIN — TRANEXAMIC ACID 1000 MG: 1 INJECTION, SOLUTION INTRAVENOUS at 11:39

## 2024-04-17 RX ADMIN — MIDAZOLAM HYDROCHLORIDE 0.5 MG: 1 INJECTION, SOLUTION INTRAMUSCULAR; INTRAVENOUS at 10:58

## 2024-04-17 RX ADMIN — PANTOPRAZOLE SODIUM 40 MG: 40 TABLET, DELAYED RELEASE ORAL at 20:32

## 2024-04-17 NOTE — ED NOTES
Pt requesting to be a DNR if things do not turn around,, advanced directive on file stating same.      Rylee M Devlin, TOMASZ  04/17/24 1305

## 2024-04-17 NOTE — ASSESSMENT & PLAN NOTE
Acute hypoxic respiratory failure; hypoxia to low 80s, requiring up to 6 L via NC with relative hypotension   Now saturating appropriately low 90s, Blood Pressure: (!) 188/80   Will continue supplement and continuous pulsox  Wean as tolerated, goal SpO2 >90%

## 2024-04-17 NOTE — ASSESSMENT & PLAN NOTE
Wt Readings from Last 3 Encounters:   04/17/24 80.3 kg (177 lb 0.5 oz)   04/03/24 79.4 kg (175 lb)   03/21/24 79.3 kg (174 lb 13.2 oz)     Chronic HFpEF, compensated on presentation  Home regimen torsemide 40 mg daily  Continue home diuresis   Daily standing weights  Monitor I/Os, serial BMP  Sodium restriction   Last echo 6/8/23: LVEF 65% with G1DD

## 2024-04-17 NOTE — H&P
Interventional Radiology  History and Physical 4/17/2024     Jay Roach Jr.   1945   [id number]    Assessment/Plan:  79-year-old man with history of CAD, CKD3, T2DM, prior smoking, prior asbestos exposure, and an enlarging left lung mildly hypermetabolic solitary pulmonary nodule suspicious for malignancy.  He has been referred for image-guided biopsy for further evaluation.    Problem List Items Addressed This Visit          Other    Mass of left lung    Relevant Orders    IR biopsy lung          Subjective:     Patient ID: Jay Roach Jr. is a 79 y.o. male.    History of Present Illness  79-year-old man with history of CAD, CKD3, T2DM, prior smoking, prior asbestos exposure, and an enlarging left lung mildly hypermetabolic solitary pulmonary nodule suspicious for malignancy.  He has been referred for image-guided biopsy for further evaluation.          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 (HCC) 02/24/2023    Basal cell carcinoma     right cheek    CAD (coronary artery disease)     Carotid stenosis, asymptomatic, bilateral     Chronic kidney disease     Colon polyp     Dependence on respirator (ventilator) status (HCC) 04/07/2023    Diabetes (HCC)     type 2, non-insulin dependent    Diabetes mellitus (HCC)     GERD (gastroesophageal reflux disease)     History of nephrolithiasis     Hyperlipidemia     Hypertension     Left foot pain 11/30/2022    PAD (peripheral artery disease) (HCC)     Severe aortic stenosis     Squamous cell skin cancer 11/22/2022    left superior helix        Past Surgical History:   Procedure Laterality Date    APPENDECTOMY      CARDIAC CATHETERIZATION N/A 05/05/2022    Procedure: CARDIAC RHC/LHC;  Surgeon: Arvin Sanchez DO;  [facility];  Service:  Cardiology    CARDIAC CATHETERIZATION N/A 05/05/2022    Procedure: Cardiac Coronary Angiogram;  Surgeon: Arvin Sanchez DO;  [facility];  Service: Cardiology    CARDIAC CATHETERIZATION N/A 05/24/2022    Procedure: Cardiac pci;  Surgeon: Damien Jeter MD;  [facility];  Service: Cardiology    CARDIAC CATHETERIZATION N/A 06/14/2022    Procedure: CARDIAC TAVR;  Surgeon: Nahum Vaz MD;  [facility];  Service: Cardiology    COLECTOMY      COLONOSCOPY  2013    CORONARY ARTERY BYPASS GRAFT  2013    X 2    FEMORAL ARTERY - POPLITEAL ARTERY BYPASS GRAFT      HEMORRHOID SURGERY      IR AORTAGRAM WITH RUN-OFF  11/19/2018    IR AORTAGRAM WITH RUN-OFF  03/02/2023    IR LOWER EXTREMITY ANGIOGRAM  03/23/2023    MOHS SURGERY Left 01/11/2023    SCC left superior helix-Dr Guy    LA SLCTV CATHJ 3RD+ ORD SLCTV ABDL PEL/LXTR BRNCH Left 08/12/2016    Procedure: LEFT FEMORAL ARTERIOGRAM; BALLOON ANGIOPLASTY; SFA  AND FEMORAL AT VEIN GRAFT;  Surgeon: Nicholas Urena MD;  Location[facility];  Service: Vascular    LA TEAEC W/WO PATCH GRAFT COMMON FEMORAL Right 03/23/2023    Procedure: Common femoral endarterectomy&antegrade intervention of SFA, popliteal artery w/ shockwave;  Surgeon: Eagle Higuera MD;  [facility];  Service: Vascular    LA TRANSCATHETER TRANSAPICAL REPLACEMT AORTIC VALVE N/A 06/14/2022    Procedure: REPLACEMENT AORTIC VALVE TRANSCATHETER (TAVR) TRANSAPICAL 29MM IRVIN KYLER S3 ULTRA VALVE(ACCESS ON LEFT) ELANA;  Surgeon: Amarjit Gordon DO;  [facility];  Service: Cardiac Surgery    SKIN CANCER EXCISION      TONSILLECTOMY AND ADENOIDECTOMY      UPPER GASTROINTESTINAL ENDOSCOPY          Social History     Tobacco Use   Smoking Status Every Day    Current packs/day: 1.00    Average packs/day: 0.6 packs/day for 100.0 years (62.5 ttl pk-yrs)    Types: Cigarettes    Passive exposure: Current   Smokeless Tobacco Never        Social History      Substance and Sexual Activity   Alcohol Use Not Currently    Comment: rare        Social History     Substance and Sexual Activity   Drug Use No        No Known Allergies    Current Outpatient Medications   Medication Sig Dispense Refill    allopurinol (ZYLOPRIM) 300 mg tablet Take 1 tablet (300 mg total) by mouth daily 90 tablet 1    amLODIPine (NORVASC) 10 mg tablet TAKE 1 TABLET DAILY 90 tablet 3    atorvastatin (LIPITOR) 80 mg tablet TAKE 1 TABLET DAILY AT     BEDTIME 90 tablet 3    calcitriol (ROCALTROL) 0.25 mcg capsule Take 1 capsule (0.25 mcg total) by mouth daily 90 capsule 4    clopidogrel (PLAVIX) 75 mg tablet Take 1 tablet (75 mg total) by mouth daily 10 tablet 1    glimepiride (AMARYL) 1 mg tablet Take 1 tablet (1 mg total) by mouth daily with breakfast 90 tablet 1    hydrALAZINE (APRESOLINE) 25 mg tablet Take 1 tablet (25 mg total) by mouth 2 (two) times a day 180 tablet 3    metoprolol succinate (TOPROL-XL) 25 mg 24 hr tablet Take 0.5 tablets (12.5 mg total) by mouth daily 90 tablet 1    pancrelipase, Lip-Prot-Amyl, (CREON) 24,000 units Take 24,000 units of lipase by mouth 3 (three) times a day with meals 360 capsule 1    pantoprazole (PROTONIX) 40 mg tablet Take 1 tablet (40 mg total) by mouth 2 (two) times a day 180 tablet 1    torsemide (DEMADEX) 20 mg tablet TAKE 0.5 TABLETS (10 MG TOTAL) BY MOUTH DAILY TAKES 10 MG ONCE A DAY. (Patient taking differently: Take 20 mg by mouth daily) 90 tablet 3    mupirocin (BACTROBAN) 2 % ointment Apply topically 3 (three) times a day (Patient not taking: Reported on 3/7/2024) 22 g 0     Current Facility-Administered Medications   Medication Dose Route Frequency Provider Last Rate Last Admin    sodium chloride 0.9 % infusion  75 mL/hr Intravenous Continuous Holland Huston MD              Objective:    Vitals:    04/17/24 0950   BP: (!) 184/78   Pulse: 55   Resp: 18   Temp: (!) 97.1 °F (36.2 °C)   TempSrc: Temporal   SpO2: 100%   Weight: 80.3 kg (177 lb 0.5  "oz)   Height: 5' 10\" (1.778 m)     Physical Exam:  General: Well appearing, no acute distress.  HEENT: NC/AT.  EOMI.  CV: RRR  Chest: Normal respiratory effort.  Speaking in full sentences.  Abdomen: Soft, ND/NT.  Skin: Warm and dry  Neuro: Alert and oriented x 3.  No focal deficits.          No results found for: \"BNP\"   Lab Results   Component Value Date    WBC 6.67 04/17/2024    HGB 11.2 (L) 04/17/2024    HCT 34.8 (L) 04/17/2024    MCV 92 04/17/2024     04/17/2024     Lab Results   Component Value Date    INR 0.95 04/17/2024    INR 1.04 12/25/2023    INR 1.00 12/24/2023    PROTIME 13.3 04/17/2024    PROTIME 14.2 12/25/2023    PROTIME 13.8 12/24/2023     Lab Results   Component Value Date    PTT 29 12/23/2023         I have personally reviewed pertinent imaging and laboratory results.     Code Status: Prior  Advance Directive and Living Will: Yes    Power of :    POLST:      This text is generated with voice recognition software. There may be translation, syntax,  or grammatical errors. If you have any questions, please contact the dictating provider.   "

## 2024-04-17 NOTE — ASSESSMENT & PLAN NOTE
Hold amaryl  Start SSI # 2   Regular diet for now  Blood glucose monitoring ACHS  Hypoglycemia protocol  Adjust as needed

## 2024-04-17 NOTE — ASSESSMENT & PLAN NOTE
In setting of lung biopsy complication; hgb 11.2 initially with decrease to 9.7  s/p nebulized TXA  Will continue serial hgb monitor  Baseline hgb variable 7.8-10.6 recently   Hold medical DVT prophylaxis, SCDs only   Blood consent obtained, type and screen, will order 1 unit on hold

## 2024-04-17 NOTE — ASSESSMENT & PLAN NOTE
Presented to outpatient IR for planned left lower lung mass biopsy; sharlene-procedural complication due to patient movement, leading to hemoptysis and hypoxia  Medical emergency called, ICU evaluated patient alongside IR  Plan as below  Follow up outpatient for biopsy results   Will order follow up CXR in am to ensure no pneumothorax

## 2024-04-17 NOTE — H&P
Formerly [facility], later [facility]  H&P  Name: Jay Roach Jr. 79 y.o. male I MRN: [id number]  Unit/Bed#: FT 03 I Date of Admission: 4/17/2024   Date of Service: 4/17/2024 I Hospital Day: 0      Assessment/Plan   * Mass of left lung  Assessment & Plan  Presented to outpatient IR for planned left lower lung mass biopsy; sharlene-procedural complication due to patient movement, leading to hemoptysis and hypoxia  Medical emergency called, ICU evaluated patient alongside IR  Plan as below  Follow up outpatient for biopsy results   Will order follow up CXR in am to ensure no pneumothorax     Acute blood loss anemia  Assessment & Plan  In setting of lung biopsy complication; hgb 11.2 initially with decrease to 9.7  s/p nebulized TXA  Will continue serial hgb monitor  Baseline hgb variable 7.8-10.6 recently   Hold medical DVT prophylaxis, SCDs only   Blood consent obtained, type and screen, will order 1 unit on hold     Post-procedural respiratory complication  Assessment & Plan  Acute hypoxic respiratory failure; hypoxia to low 80s, requiring up to 6 L via NC with relative hypotension   Now saturating appropriately low 90s, Blood Pressure: (!) 188/80   Will continue supplement and continuous pulsox  Wean as tolerated, goal SpO2 >90%    Type 2 diabetes mellitus with stage 4 chronic kidney disease, without long-term current use of insulin (HCC)  Assessment & Plan  Hold amaryl  Start SSI # 2   Regular diet for now  Blood glucose monitoring ACHS  Hypoglycemia protocol  Adjust as needed     Secondary hyperparathyroidism of renal origin (HCC)  Assessment & Plan  Continue calcitriol     Chronic pancreatitis (HCC)  Assessment & Plan  Continue creon [id number] with meals     Chronic diastolic CHF (congestive heart failure) (HCC)  Assessment & Plan  Wt Readings from Last 3 Encounters:   04/17/24 80.3 kg (177 lb 0.5 oz)   04/03/24 79.4 kg (175 lb)   03/21/24 79.3 kg (174 lb 13.2 oz)     Chronic HFpEF, compensated on  presentation  Home regimen torsemide 40 mg daily  Continue home diuresis   Daily standing weights  Monitor I/Os, serial BMP  Sodium restriction   Last echo 6/8/23: LVEF 65% with G1DD    CKD (chronic kidney disease) stage 4, GFR 15-29 ml/min ([facility])  Assessment & Plan  Creatinine within baseline, will monitor          VTE Pharmacologic Prophylaxis: VTE Score: 8 High Risk (Score >/= 5) - Pharmacological DVT Prophylaxis Contraindicated. Sequential Compression Devices Ordered.  Code Status: Prior level 3 DNR/DNI   Discussion with family: Patient declined call to .  States his brother was here earlier     Anticipated Length of Stay: Patient will be admitted on an inpatient basis with an anticipated length of stay of greater than 2 midnights secondary to post procedural hemoptysis and anemia with hypoxia and hypotension requiring monitoring.    Total Time Spent on Date of Encounter in care of patient: 60 mins. This time was spent on one or more of the following: performing physical exam; counseling and coordination of care; obtaining or reviewing history; documenting in the medical record; reviewing/ordering tests, medications or procedures; communicating with other healthcare professionals and discussing with patient's family/caregivers.    Chief Complaint: shortness of breath and hemoptysis following lung mass biopsy       History of Present Illness:  Jay Roach Jr. is a 79 y.o. male with a PMH of probable lung cancer, CAD s/p CABG, PAD with claudication, non-insulin dependent diabetes, CKD 4, chronic anemia, secondary hyperparathyroidismHTN, HLD, GERD who presents post procedural following scheduled outpatient left lower lung mass biopsy. During the procedure, patient reportedly shifted position, causing needle to change trajectory and ultimately leading to hemoptysis, hypoxia, and hypotension.  Presently, main complaint is posterior chest pain from biopsy site and thirst. Denies further hemoptysis.      Review of Systems:  Review of Systems   Constitutional:  Positive for chills. Negative for activity change, appetite change and fever.   Respiratory:  Positive for cough and shortness of breath. Negative for chest tightness.    Cardiovascular:  Positive for chest pain (left posterior from biopsy site).   Gastrointestinal:  Negative for abdominal pain and vomiting.   Genitourinary:  Negative for difficulty urinating.   Neurological:  Negative for dizziness, light-headedness and headaches.   Psychiatric/Behavioral:  Negative for confusion.    All other systems reviewed and are negative.      Past Medical and Surgical History:   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 ([facility]) 09/10/2021    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 ([facility]) 02/24/2023    Basal cell carcinoma     right cheek    CAD (coronary artery disease)     Carotid stenosis, asymptomatic, bilateral     Chronic kidney disease     Colon polyp     Dependence on respirator (ventilator) status ([facility]) 04/07/2023    Diabetes ([facility])     type 2, non-insulin dependent    Diabetes mellitus ([facility])     GERD (gastroesophageal reflux disease)     History of nephrolithiasis     Hyperlipidemia     Hypertension     Left foot pain 11/30/2022    PAD (peripheral artery disease) ([facility])     Severe aortic stenosis     Squamous cell skin cancer 11/22/2022    left superior helix       Past Surgical History:   Procedure Laterality Date    APPENDECTOMY      CARDIAC CATHETERIZATION N/A 05/05/2022    Procedure: CARDIAC RHC/LHC;  Surgeon: Arvin Sanchez DO;  [facility];  Service: Cardiology    CARDIAC CATHETERIZATION N/A 05/05/2022    Procedure: Cardiac Coronary Angiogram;  Surgeon: Arvin Sanchez DO;  [facility];  Service: Cardiology    CARDIAC CATHETERIZATION N/A 05/24/2022    Procedure: Cardiac pci;   Surgeon: Damien Jeter MD;  [facility];  Service: Cardiology    CARDIAC CATHETERIZATION N/A 06/14/2022    Procedure: CARDIAC TAVR;  Surgeon: Nahum Vaz MD;  [facility];  Service: Cardiology    COLECTOMY      COLONOSCOPY  2013    CORONARY ARTERY BYPASS GRAFT  2013    X 2    FEMORAL ARTERY - POPLITEAL ARTERY BYPASS GRAFT      HEMORRHOID SURGERY      IR AORTAGRAM WITH RUN-OFF  11/19/2018    IR AORTAGRAM WITH RUN-OFF  03/02/2023    IR LOWER EXTREMITY ANGIOGRAM  03/23/2023    MOHS SURGERY Left 01/11/2023    SCC left superior helix-Dr Guy    ID SLCTV CATHJ 3RD+ ORD SLCTV ABDL PEL/LXTR BRNCH Left 08/12/2016    Procedure: LEFT FEMORAL ARTERIOGRAM; BALLOON ANGIOPLASTY; SFA  AND FEMORAL AT VEIN GRAFT;  Surgeon: Nicholas Urena MD;  [facility];  Service: Vascular    ID TEAEC W/WO PATCH GRAFT COMMON FEMORAL Right 03/23/2023    Procedure: Common femoral endarterectomy&antegrade intervention of SFA, popliteal artery w/ shockwave;  Surgeon: Eagle Higuera MD;  [facility];  Service: Vascular    ID TRANSCATHETER TRANSAPICAL REPLACEMT AORTIC VALVE N/A 06/14/2022    Procedure: REPLACEMENT AORTIC VALVE TRANSCATHETER (TAVR) TRANSAPICAL 29MM IRVIN KYLER S3 ULTRA VALVE(ACCESS ON LEFT) ELANA;  Surgeon: Amarjit Gordon DO;  [facility];  Service: Cardiac Surgery    SKIN CANCER EXCISION      TONSILLECTOMY AND ADENOIDECTOMY      UPPER GASTROINTESTINAL ENDOSCOPY         Meds/Allergies:  Prior to Admission medications    Medication Sig Start Date End Date Taking? Authorizing Provider   allopurinol (ZYLOPRIM) 300 mg tablet Take 1 tablet (300 mg total) by mouth daily 1/16/24   Simón Hadley MD   amLODIPine (NORVASC) 10 mg tablet TAKE 1 TABLET DAILY 11/27/23   Israel Sanchez MD   atorvastatin (LIPITOR) 80 mg tablet TAKE 1 TABLET DAILY AT     BEDTIME 2/22/23   Israel Sanchez MD   calcitriol (ROCALTROL) 0.25 mcg capsule Take 1 capsule (0.25 mcg total) by mouth daily  9/5/23   Marcel Muñoz MD   clopidogrel (PLAVIX) 75 mg tablet Take 1 tablet (75 mg total) by mouth daily 3/15/24   Israel Sanchez MD   glimepiride (AMARYL) 1 mg tablet Take 1 tablet (1 mg total) by mouth daily with breakfast 1/16/24 7/14/24  Simón Hadley MD   hydrALAZINE (APRESOLINE) 25 mg tablet Take 1 tablet (25 mg total) by mouth 2 (two) times a day 3/18/24   Israel Sanchez MD   metoprolol succinate (TOPROL-XL) 25 mg 24 hr tablet Take 0.5 tablets (12.5 mg total) by mouth daily 2/21/24   Israel Sanchez MD   mupirocin (BACTROBAN) 2 % ointment Apply topically 3 (three) times a day  Patient not taking: Reported on 3/7/2024 1/16/24   ZHANNA Alvarez   pancrelipase, Lip-Prot-Amyl, (CREON) 24,000 units Take 24,000 units of lipase by mouth 3 (three) times a day with meals 1/16/24   ZHANNA Gonzalez   pantoprazole (PROTONIX) 40 mg tablet Take 1 tablet (40 mg total) by mouth 2 (two) times a day 2/6/24   Amarjit Summers MD   torsemide (DEMADEX) 20 mg tablet TAKE 0.5 TABLETS (10 MG TOTAL) BY MOUTH DAILY TAKES 10 MG ONCE A DAY.  Patient taking differently: Take 20 mg by mouth daily 11/7/23   Israel Sanchez MD     I have reviewed home medications with patient personally.    Allergies: No Known Allergies    Social History:  Marital Status:    Occupation:   Patient Pre-hospital Living Situation: Home  Patient Pre-hospital Level of Mobility: walks  Patient Pre-hospital Diet Restrictions: diabetic, cardiac   Substance Use History:   Social History     Substance and Sexual Activity   Alcohol Use Not Currently    Comment: rare     Social History     Tobacco Use   Smoking Status Every Day    Current packs/day: 1.00    Average packs/day: 0.6 packs/day for 100.0 years (62.5 ttl pk-yrs)    Types: Cigarettes    Passive exposure: Current   Smokeless Tobacco Never     Social History     Substance and Sexual Activity   Drug Use No       Family History:  Family History   Problem Relation Age of Onset    Heart  attack Father     Other Sister         bypass and vlave replacement    Stroke Paternal Uncle     Arrhythmia Neg Hx     Asthma Neg Hx     Clotting disorder Neg Hx     Fainting Neg Hx     Anuerysm Neg Hx     Hypertension Neg Hx         unsure     Hyperlipidemia Neg Hx     Heart failure Neg Hx        Physical Exam:     Vitals:   Blood Pressure: (!) 188/80 (04/17/24 1345)  Pulse: 61 (04/17/24 1345)  Temperature: 98 °F (36.7 °C) (04/17/24 1134)  Temp Source: Oral (04/17/24 1134)  Respirations: 20 (04/17/24 1345)  SpO2: 91 % (04/17/24 1345)    Physical Exam  Vitals and nursing note reviewed.   Constitutional:       General: He is awake. He is not in acute distress.     Appearance: Normal appearance. He is well-developed. He is diaphoretic. He is not ill-appearing or toxic-appearing.   HENT:      Mouth/Throat:      Mouth: Mucous membranes are dry.   Cardiovascular:      Rate and Rhythm: Normal rate and regular rhythm.      Heart sounds: Normal heart sounds. No murmur heard.  Pulmonary:      Effort: Pulmonary effort is normal. No respiratory distress.      Breath sounds: Normal breath sounds. No wheezing, rhonchi or rales.   Abdominal:      General: Bowel sounds are normal. There is no distension.      Palpations: Abdomen is soft.      Tenderness: There is no abdominal tenderness.   Musculoskeletal:      Right lower leg: No edema.      Left lower leg: No edema.   Skin:     Coloration: Skin is not pale.   Neurological:      General: No focal deficit present.      Mental Status: He is alert and oriented to person, place, and time.      Comments: rigors   Psychiatric:         Mood and Affect: Mood normal.         Behavior: Behavior normal. Behavior is cooperative.          Additional Data:     Lab Results:  Results from last 7 days   Lab Units 04/17/24  1351 04/17/24  1138   WBC Thousand/uL  --  6.86   HEMOGLOBIN g/dL 9.4* 9.7*   HEMATOCRIT % 29.0* 29.8*   PLATELETS Thousands/uL  --  151   SEGS PCT %  --  62   LYMPHO PCT %  --   25   MONO PCT %  --  7   EOS PCT %  --  6     Results from last 7 days   Lab Units 04/17/24  1138   SODIUM mmol/L 142   POTASSIUM mmol/L 4.3   CHLORIDE mmol/L 109*   CO2 mmol/L 26   BUN mg/dL 39*   CREATININE mg/dL 2.38*   ANION GAP mmol/L 7   CALCIUM mg/dL 8.7   ALBUMIN g/dL 3.6   TOTAL BILIRUBIN mg/dL 0.50   ALK PHOS U/L 70   ALT U/L 6*   AST U/L 11*   GLUCOSE RANDOM mg/dL 143*     Results from last 7 days   Lab Units 04/17/24  1201   INR  1.01     Results from last 7 days   Lab Units 04/17/24  1403 04/11/24  1301   POC GLUCOSE mg/dl 160* 104               Lines/Drains:  Invasive Devices       Peripheral Intravenous Line  Duration             Peripheral IV 04/17/24 Dorsal (posterior);Right Forearm <1 day                        Imaging: No pertinent imaging reviewed.  No orders to display       EKG and Other Studies Reviewed on Admission:   EKG: No EKG obtained.    ** Please Note: This note has been constructed using a voice recognition system. **

## 2024-04-17 NOTE — ED PROVIDER NOTES
History  Chief Complaint   Patient presents with    Shortness of Breath     PT reports shortness of breath and bleeding following a lung biopsy.      79-year-old male comes to the ER for evaluation of hemoptysis. PMH: Basal cell carcinoma-right cheek, CAD, diabetes, hyperlipidemia, hypertension, PAD, severe aortic stenosis, CKD.  Patient was in CT scan getting an IR procedure.  Patient was getting a lung biopsy to rule out cancer when he started to have gross amount of hemoptysis.  Patient was a medical emergency and ER staff responded.  Patient had approximately 200 cc of blood suctioned from his mouth.  Patient was actively in distress and brought over to the ER for further evaluation.  Patient initially was hypertensive in the 150s systolic and then dropped to 100s systolic.  Patient states that he does not wear oxygen chronically and was on 6 L nasal cannula with an oxygen saturation of 82%.  Patient does smoke currently and worked around asbestos.  Patient was given Versed and fentanyl.  He complains of chest pain at the insertion site.  Patient is not actively bleeding from wound.  Denies fever, chills, nausea or vomiting.  Short sentences and feels short of breath.  Patient stated that he is a DNR/DNI.            Prior to Admission Medications   Prescriptions Last Dose Informant Patient Reported? Taking?   allopurinol (ZYLOPRIM) 300 mg tablet  Self No No   Sig: Take 1 tablet (300 mg total) by mouth daily   amLODIPine (NORVASC) 10 mg tablet  Self No No   Sig: TAKE 1 TABLET DAILY   atorvastatin (LIPITOR) 80 mg tablet  Self No No   Sig: TAKE 1 TABLET DAILY AT     BEDTIME   calcitriol (ROCALTROL) 0.25 mcg capsule  Self No No   Sig: Take 1 capsule (0.25 mcg total) by mouth daily   clopidogrel (PLAVIX) 75 mg tablet  Self No No   Sig: Take 1 tablet (75 mg total) by mouth daily   glimepiride (AMARYL) 1 mg tablet  Self No No   Sig: Take 1 tablet (1 mg total) by mouth daily with breakfast   hydrALAZINE (APRESOLINE) 25  mg tablet   No No   Sig: Take 1 tablet (25 mg total) by mouth 2 (two) times a day   metoprolol succinate (TOPROL-XL) 25 mg 24 hr tablet  Self No No   Sig: Take 0.5 tablets (12.5 mg total) by mouth daily   mupirocin (BACTROBAN) 2 % ointment  Self No No   Sig: Apply topically 3 (three) times a day   Patient not taking: Reported on 3/7/2024   pancrelipase, Lip-Prot-Amyl, (CREON) 24,000 units  Self No No   Sig: Take 24,000 units of lipase by mouth 3 (three) times a day with meals   pantoprazole (PROTONIX) 40 mg tablet  Self No No   Sig: Take 1 tablet (40 mg total) by mouth 2 (two) times a day   torsemide (DEMADEX) 20 mg tablet  Self No No   Sig: TAKE 0.5 TABLETS (10 MG TOTAL) BY MOUTH DAILY TAKES 10 MG ONCE A DAY.   Patient taking differently: Take 20 mg by mouth daily      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 ([facility]) 09/10/2021    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 ([facility]) 02/24/2023    Basal cell carcinoma     right cheek    CAD (coronary artery disease)     Carotid stenosis, asymptomatic, bilateral     Chronic kidney disease     Colon polyp     Dependence on respirator (ventilator) status ([facility]) 04/07/2023    Diabetes ([facility])     type 2, non-insulin dependent    Diabetes mellitus ([facility])     GERD (gastroesophageal reflux disease)     History of nephrolithiasis     Hyperlipidemia     Hypertension     Left foot pain 11/30/2022    PAD (peripheral artery disease) ([facility])     Severe aortic stenosis     Squamous cell skin cancer 11/22/2022    left superior helix       Past Surgical History:   Procedure Laterality Date    APPENDECTOMY      CARDIAC CATHETERIZATION N/A 05/05/2022    Procedure: CARDIAC RHC/LHC;  Surgeon: Arvin Sanchez DO;  Location: [facility];  Service: Cardiology    CARDIAC CATHETERIZATION N/A 05/05/2022    Procedure:  Cardiac Coronary Angiogram;  Surgeon: Arvin Sanchez DO;  [facility]: [facility];  Service: Cardiology    CARDIAC CATHETERIZATION N/A 05/24/2022    Procedure: Cardiac pci;  Surgeon: Damien Jeter MD;  [facility]: [facility];  Service: Cardiology    CARDIAC CATHETERIZATION N/A 06/14/2022    Procedure: CARDIAC TAVR;  Surgeon: Nahum Vaz MD;  Location: [facility];  Service: Cardiology    COLECTOMY      COLONOSCOPY  2013    CORONARY ARTERY BYPASS GRAFT  2013    X 2    FEMORAL ARTERY - POPLITEAL ARTERY BYPASS GRAFT      HEMORRHOID SURGERY      IR AORTAGRAM WITH RUN-OFF  11/19/2018    IR AORTAGRAM WITH RUN-OFF  03/02/2023    IR LOWER EXTREMITY ANGIOGRAM  03/23/2023    MOHS SURGERY Left 01/11/2023    SCC left superior helix-Dr Guy    SD SLCTV CATHJ 3RD+ ORD SLCTV ABDL PEL/LXTR BRNCH Left 08/12/2016    Procedure: LEFT FEMORAL ARTERIOGRAM; BALLOON ANGIOPLASTY; SFA  AND FEMORAL AT VEIN GRAFT;  Surgeon: Nicholas Urena MD;  Location: [facility];  Service: Vascular    SD TEAEC W/WO PATCH GRAFT COMMON FEMORAL Right 03/23/2023    Procedure: Common femoral endarterectomy&antegrade intervention of SFA, popliteal artery w/ shockwave;  Surgeon: Eagle Higuera MD;  Location: [facility];  Service: Vascular    SD TRANSCATHETER TRANSAPICAL REPLACEMT AORTIC VALVE N/A 06/14/2022    Procedure: REPLACEMENT AORTIC VALVE TRANSCATHETER (TAVR) TRANSAPICAL 29MM IRVIN KYLER S3 ULTRA VALVE(ACCESS ON LEFT) ELANA;  Surgeon: Amarjit Gordon DO;  Location: [facility];  Service: Cardiac Surgery    SKIN CANCER EXCISION      TONSILLECTOMY AND ADENOIDECTOMY      UPPER GASTROINTESTINAL ENDOSCOPY         Family History   Problem Relation Age of Onset    Heart attack Father     Other Sister         bypass and vlave replacement    Stroke Paternal Uncle     Arrhythmia Neg Hx     Asthma Neg Hx     Clotting disorder Neg Hx     Fainting Neg Hx     Anuerysm Neg Hx     Hypertension Neg Hx         unsure     Hyperlipidemia Neg Hx      Heart failure Neg Hx      I have reviewed and agree with the history as documented.    E-Cigarette/Vaping    E-Cigarette Use Never User      E-Cigarette/Vaping Substances    Nicotine No     THC No     CBD No     Flavoring No     Other No     Unknown No      Social History     Tobacco Use    Smoking status: Every Day     Current packs/day: 1.00     Average packs/day: 0.6 packs/day for 100.0 years (62.5 ttl pk-yrs)     Types: Cigarettes     Passive exposure: Current    Smokeless tobacco: Never   Vaping Use    Vaping status: Never Used   Substance Use Topics    Alcohol use: Not Currently     Comment: rare    Drug use: No       Review of Systems   Constitutional:  Negative for chills and fever.   Eyes:  Negative for pain and visual disturbance.   Respiratory:  Positive for cough and shortness of breath.         Hemoptysis   Cardiovascular:  Positive for chest pain. Negative for palpitations.   Gastrointestinal:  Negative for abdominal pain, nausea and vomiting.   Genitourinary:  Negative for dysuria and hematuria.   Musculoskeletal:  Negative for arthralgias and back pain.   Skin:  Negative for color change and rash.   Neurological:  Negative for seizures and syncope.   All other systems reviewed and are negative.      Physical Exam  Physical Exam  Vitals and nursing note reviewed.   Constitutional:       General: He is in acute distress.      Appearance: He is well-developed.   HENT:      Head: Normocephalic and atraumatic.   Eyes:      Conjunctiva/sclera: Conjunctivae normal.   Cardiovascular:      Rate and Rhythm: Normal rate and regular rhythm.      Pulses: Normal pulses.      Heart sounds: Normal heart sounds. No murmur heard.  Pulmonary:      Effort: Tachypnea and respiratory distress present.      Breath sounds: Decreased breath sounds and rhonchi present.   Abdominal:      Palpations: Abdomen is soft.      Tenderness: There is no abdominal tenderness.   Musculoskeletal:         General: No swelling.       Cervical back: Neck supple.   Skin:     General: Skin is warm and dry.      Capillary Refill: Capillary refill takes less than 2 seconds.   Neurological:      Mental Status: He is alert and oriented to person, place, and time.   Psychiatric:         Mood and Affect: Mood normal.         Behavior: Behavior normal.         Vital Signs  ED Triage Vitals   Temperature Pulse Respirations Blood Pressure SpO2   04/17/24 1134 04/17/24 1134 04/17/24 1134 04/17/24 1134 04/17/24 1134   98 °F (36.7 °C) 60 19 107/53 (!) 82 %      Temp Source Heart Rate Source Patient Position - Orthostatic VS BP Location FiO2 (%)   04/17/24 1134 04/17/24 1134 04/17/24 1134 04/17/24 1134 --   Oral Monitor Lying Right arm       Pain Score       04/17/24 1345       No Pain           Vitals:    04/17/24 1200 04/17/24 1215 04/17/24 1345 04/17/24 1451   BP: 134/60 161/69 (!) 188/80 (!) 180/60   Pulse: (!) 52 (!) 54 61 62   Patient Position - Orthostatic VS: Lying Lying Lying          Visual Acuity      ED Medications  Medications   atorvastatin (LIPITOR) tablet 80 mg (has no administration in time range)   calcitriol (ROCALTROL) capsule 0.25 mcg (has no administration in time range)   amLODIPine (NORVASC) tablet 10 mg (has no administration in time range)   pancrelipase (Lip-Prot-Amyl) (CREON) delayed release capsule 24,000 Units (has no administration in time range)   allopurinol (ZYLOPRIM) tablet 300 mg (has no administration in time range)   pantoprazole (PROTONIX) EC tablet 40 mg (has no administration in time range)   metoprolol succinate (TOPROL-XL) 24 hr tablet 12.5 mg (has no administration in time range)   hydrALAZINE (APRESOLINE) tablet 25 mg (has no administration in time range)   torsemide (DEMADEX) tablet 20 mg (has no administration in time range)   acetaminophen (TYLENOL) tablet 650 mg (has no administration in time range)   aluminum-magnesium hydroxide-simethicone (MAALOX) oral suspension 30 mL (has no administration in time range)    polyethylene glycol (MIRALAX) packet 17 g (has no administration in time range)   ondansetron (ZOFRAN) injection 4 mg (has no administration in time range)   sodium chloride 0.9 % bolus 1,000 mL (0 mL Intravenous Stopped 4/17/24 1350)   tranexamic acid 100mg/mL (for epistaxis) 1,000 mg (1,000 mg Nasal Given 4/17/24 1139)       Diagnostic Studies  Results Reviewed       Procedure Component Value Units Date/Time    Fingerstick Glucose (POCT) [[id number]]  (Abnormal) Collected: 04/17/24 1403    Lab Status: Final result Specimen: Blood Updated: 04/17/24 1404     POC Glucose 160 mg/dl     Hemoglobin and hematocrit, blood [[id number]]  (Abnormal) Collected: 04/17/24 1351    Lab Status: Final result Specimen: Blood from Arm, Right Updated: 04/17/24 1400     Hemoglobin 9.4 g/dL      Hematocrit 29.0 %     Protime-INR [[id number]]  (Normal) Collected: 04/17/24 1201    Lab Status: Final result Specimen: Blood from Arm, Right Updated: 04/17/24 1231     Protime 13.9 seconds      INR 1.01    APTT [[id number]]  (Normal) Collected: 04/17/24 1201    Lab Status: Final result Specimen: Blood from Arm, Right Updated: 04/17/24 1231     PTT 27 seconds     Comprehensive metabolic panel [[id number]]  (Abnormal) Collected: 04/17/24 1138    Lab Status: Final result Specimen: Blood from Arm, Left Updated: 04/17/24 1158     Sodium 142 mmol/L      Potassium 4.3 mmol/L      Chloride 109 mmol/L      CO2 26 mmol/L      ANION GAP 7 mmol/L      BUN 39 mg/dL      Creatinine 2.38 mg/dL      Glucose 143 mg/dL      Calcium 8.7 mg/dL      AST 11 U/L      ALT 6 U/L      Alkaline Phosphatase 70 U/L      Total Protein 6.3 g/dL      Albumin 3.6 g/dL      Total Bilirubin 0.50 mg/dL      eGFR 24 ml/min/1.73sq m     Narrative:      National Kid[employer] guidelines for Chronic Kidney Disease (CKD):     Stage 1 with normal or high GFR (GFR > 90 mL/min/1.73 square meters)    Stage 2 Mild CKD (GFR = 60-89 mL/min/1.73 square meters)    Stage 3A Moderate  CKD (GFR = 45-59 mL/min/1.73 square meters)    Stage 3B Moderate CKD (GFR = 30-44 mL/min/1.73 square meters)    Stage 4 Severe CKD (GFR = 15-29 mL/min/1.73 square meters)    Stage 5 End Stage CKD (GFR <15 mL/min/1.73 square meters)  Note: GFR calculation is accurate only with a steady state creatinine    CBC and differential [[id number]]  (Abnormal) Collected: 04/17/24 1138    Lab Status: Final result Specimen: Blood from Arm, Left Updated: 04/17/24 1144     WBC 6.86 Thousand/uL      RBC 3.20 Million/uL      Hemoglobin 9.7 g/dL      Hematocrit 29.8 %      MCV 93 fL      MCH 30.3 pg      MCHC 32.6 g/dL      RDW 15.4 %      MPV 11.1 fL      Platelets 151 Thousands/uL      nRBC 0 /100 WBCs      Segmented % 62 %      Immature Grans % 0 %      Lymphocytes % 25 %      Monocytes % 7 %      Eosinophils Relative 6 %      Basophils Relative 0 %      Absolute Neutrophils 4.25 Thousands/µL      Absolute Immature Grans 0.02 Thousand/uL      Absolute Lymphocytes 1.68 Thousands/µL      Absolute Monocytes 0.49 Thousand/µL      Eosinophils Absolute 0.39 Thousand/µL      Basophils Absolute 0.03 Thousands/µL                    No orders to display              Procedures  Procedures         ED Course  ED Course as of 04/17/24 1630   Wed Apr 17, 2024   1145 Spoke with patient and went over living will. Patient is a DNR/DNI   1300 Spoke to [facility] for admission/observation   1328 [facility] & Direct-[facility] (Saji Sahu ([facility]  Ramos  Ok as long as he's not actively bleeding and stable sounds like he is ok for the floor  1:24 PM                               SBIRT 20yo+      Flowsheet Row Most Recent Value   Initial Alcohol Screen: US AUDIT-C     1. How often do you have a drink containing alcohol? 0 Filed at: 04/17/2024 1136   2. How many drinks containing alcohol do you have on a typical day you are drinking?  0 Filed at: 04/17/2024 1136   3a. Male UNDER 65: How often do you have five or more drinks on one occasion? 0 Filed at:  04/17/2024 1136   3b. FEMALE Any Age, or MALE 65+: How often do you have 4 or more drinks on one occassion? 0 Filed at: 04/17/2024 1136   Audit-C Score 0 Filed at: 04/17/2024 1136   MERRILL: How many times in the past year have you...    Used an illegal drug or used a prescription medication for non-medical reasons? Never Filed at: 04/17/2024 1136                      Medical Decision Making  79-year-old male patient presents ER for evaluation of hemoptysis.  Patient was getting a lung biopsy today with IR and was a medical emergency.  Patient found to be hypoxic and placed on 6 L nasal cannula which was 82%.  Patient given nebulized TXA.  Patient was respiratory distress and was improved after the TXA.  Patient was placed on 6 L nasal cannula and oxygen ranged from 90 to 92%.  She is patient had imaging through IR which did not require a chest tube.  Patient has injured parenchymal bleeding at this time.  Patient is a DNR/DNI.  Patient is discussed with [facility] internal medicine and agreed for further evaluation.    Amount and/or Complexity of Data Reviewed  Labs: ordered.    Risk  Prescription drug management.  Decision regarding hospitalization.             Disposition  Final diagnoses:   Hemoptysis   Dyspnea   Hypoxia     Time reflects when diagnosis was documented in both MDM as applicable and the Disposition within this note       Time User Action Codes Description Comment    4/17/2024  1:29 PM Harika Vieyra Add [R04.2] Hemoptysis     4/17/2024  1:29 PM Harika Vieyra Add [R06.00] Dyspnea     4/17/2024  1:29 PM Harika Vieyra Add [R09.02] Hypoxia           ED Disposition       ED Disposition   Admit    Condition   Stable    Date/Time   Wed Apr 17, 2024 1328    Comment   Case was discussed with Saji Sahu and the patient's admission status was agreed to be Admission Status: inpatient status to the service of Dr. Sahu .               Follow-up Information    None         Current Discharge Medication List         CONTINUE these medications which have NOT CHANGED    Details   allopurinol (ZYLOPRIM) 300 mg tablet Take 1 tablet (300 mg total) by mouth daily  Qty: 90 tablet, Refills: 1    Associated Diagnoses: Gout of left foot, unspecified cause, unspecified chronicity      amLODIPine (NORVASC) 10 mg tablet TAKE 1 TABLET DAILY  Qty: 90 tablet, Refills: 3    Associated Diagnoses: Essential hypertension      atorvastatin (LIPITOR) 80 mg tablet TAKE 1 TABLET DAILY AT     BEDTIME  Qty: 90 tablet, Refills: 3    Associated Diagnoses: Hyperlipidemia, unspecified hyperlipidemia type      calcitriol (ROCALTROL) 0.25 mcg capsule Take 1 capsule (0.25 mcg total) by mouth daily  Qty: 90 capsule, Refills: 4    Associated Diagnoses: Secondary hyperparathyroidism of renal origin (HCC)      clopidogrel (PLAVIX) 75 mg tablet Take 1 tablet (75 mg total) by mouth daily  Qty: 10 tablet, Refills: 1    Associated Diagnoses: Coronary artery disease involving native heart without angina pectoris, unspecified vessel or lesion type      glimepiride (AMARYL) 1 mg tablet Take 1 tablet (1 mg total) by mouth daily with breakfast  Qty: 90 tablet, Refills: 1    Associated Diagnoses: Controlled type 2 diabetes mellitus with stage 4 chronic kidney disease, without long-term current use of insulin ([facility])      hydrALAZINE (APRESOLINE) 25 mg tablet Take 1 tablet (25 mg total) by mouth 2 (two) times a day  Qty: 180 tablet, Refills: 3    Associated Diagnoses: Primary hypertension      metoprolol succinate (TOPROL-XL) 25 mg 24 hr tablet Take 0.5 tablets (12.5 mg total) by mouth daily  Qty: 90 tablet, Refills: 1    Comments: For a # 90 day supply  Associated Diagnoses: S/P TAVR (transcatheter aortic valve replacement)      mupirocin (BACTROBAN) 2 % ointment Apply topically 3 (three) times a day  Qty: 22 g, Refills: 0    Associated Diagnoses: Cellulitis of arm, left      pancrelipase, Lip-Prot-Amyl, (CREON) 24,000 units Take 24,000 units of lipase by mouth 3 (three)  times a day with meals  Qty: 360 capsule, Refills: 1    Associated Diagnoses: Exocrine pancreatic insufficiency      pantoprazole (PROTONIX) 40 mg tablet Take 1 tablet (40 mg total) by mouth 2 (two) times a day  Qty: 180 tablet, Refills: 1    Associated Diagnoses: Gastroesophageal reflux disease, unspecified whether esophagitis present      torsemide (DEMADEX) 20 mg tablet TAKE 0.5 TABLETS (10 MG TOTAL) BY MOUTH DAILY TAKES 10 MG ONCE A DAY.  Qty: 90 tablet, Refills: 3    Associated Diagnoses: Chronic heart failure with preserved ejection fraction (HCC)             No discharge procedures on file.    PDMP Review         Value Time User    PDMP Reviewed  Yes 6/17/2022 12:05 PM Brandy Faust PA-C            ED Provider  Electronically Signed by             ZHANNA Garcia  04/17/24 0905

## 2024-04-17 NOTE — PROGRESS NOTES
[facility]  Interval Progress Note: Critical Care  Name: Jay Harrison I  MRN: [id number]  Unit/Bed#: FT 03 I Date of Admission: 4/17/2024   Date of Service: 4/17/2024 I Hospital Day: 0    Interval Events:  Called to the bedside for a rapid response.  Rapid response not documented as the patient is here for an ambulatory interventional radiology procedure.  A lung biopsy being performed to workup a left lower lobe lung nodule and procedure was aborted after 2 passes due to the development of pulmonary hemorrhage and hemoptysis.  Relative hypotension occurred with systolic blood pressure decreasing from the 170s to 110s oxygenation was maintained throughout as per the operative note.    Serial imaging via CT scan was performed showing stable blood products within the left lower lobe and no development of pneumothorax.      Pertinent New Data:   blood pressure, pulse, temperature, respirations, and pulse oximetry  Physical Exam  Vitals and nursing note reviewed.   Eyes:      Extraocular Movements: Extraocular movements intact.      Pupils: Pupils are equal, round, and reactive to light.   Skin:     General: Skin is warm and dry.      Capillary Refill: Capillary refill takes less than 2 seconds.   HENT:      Head: Normocephalic and atraumatic.      Mouth/Throat:      Mouth: Mucous membranes are moist.   Neck:      Vascular: No JVD.   Cardiovascular:      Rate and Rhythm: Normal rate and regular rhythm.      Heart sounds: Normal heart sounds.   Constitutional:       General: He is in acute distress.      Appearance: He is ill-appearing.   Pulmonary:      Effort: Tachypnea present. No respiratory distress.      Breath sounds: Rhonchi present. No rales.   Secretions are abnormal .Neurological:      General: No focal deficit present.      Mental Status: He is alert and oriented to person, place and time.      Motor: Strength full and intact in all extremities.           I have personally  reviewed pertinent lab results., CBC:   Lab Results   Component Value Date    WBC 6.86 04/17/2024    HGB 9.7 (L) 04/17/2024    HCT 29.8 (L) 04/17/2024    MCV 93 04/17/2024     04/17/2024    RBC 3.20 (L) 04/17/2024    MCH 30.3 04/17/2024    MCHC 32.6 04/17/2024    RDW 15.4 (H) 04/17/2024    MPV 11.1 04/17/2024    NRBC 0 04/17/2024   , CMP:   Lab Results   Component Value Date    SODIUM 142 04/17/2024    K 4.3 04/17/2024     (H) 04/17/2024    CO2 26 04/17/2024    BUN 39 (H) 04/17/2024    CREATININE 2.38 (H) 04/17/2024    CALCIUM 8.7 04/17/2024    AST 11 (L) 04/17/2024    ALT 6 (L) 04/17/2024    ALKPHOS 70 04/17/2024    EGFR 24 04/17/2024     chest CT scanI have personally reviewed pertinent films in PACS with a Radiologist.      Assessment and Plan  Diagnosis: Hemoptysis related to left percutaneous lung biopsy  Transferred to the emergency department for evaluation and admission to the hospital for observation following hemoptysis.  Plan: Continue supportive care with oxygen provided as needed to maintain saturation greater than 90%, left lateral decubitus position to minimize soiling of the right lung.  Consider nebulized tranexamic acid to minimize volume and character of bleeding.  CT scan reviewed with Dr. Damian Mills, no pneumothorax and stable blood in the left lowe lobe  No indication for admission to ICU/SD level of care at this time.  Billing Level:  Critical Care Time Statement: Upon my evaluation, this patient had a high probability of imminent or life-threatening deterioration due to Hemoptysis, which required my direct attention, intervention, and personal management.  I spent a total of 15 minutes directly providing critical care services, including interpretation of complex medical databases, evaluating for the presence of life-threatening injuries or illnesses, management of organ system failure(s) , and complex medical decision making (to support/prevent further  life-threatening deterioration).. This time is exclusive of procedures, teaching, family meetings, and any prior time recorded by providers other than myself.      SIGNATURE: Carl Andrade,

## 2024-04-17 NOTE — BRIEF OP NOTE (RAD/CATH)
INTERVENTIONAL RADIOLOGY PROCEDURE NOTE    Date: 4/17/2024    Procedure:   Procedure Summary       Date: 04/17/24 Room / Location: [facility] CT Scan    Anesthesia Start:  Anesthesia Stop:     Procedure: IR BIOPSY LUNG Diagnosis:       Mass of left lung      (enlarging LLL nodule.)    Scheduled Providers:  Responsible Provider:     Anesthesia Type: Not recorded ASA Status: Not recorded          Preoperative diagnosis:   1. Mass of left lung        Postoperative diagnosis: Same.    Surgeon: Damian Mills MD     Assistant: None. No qualified resident was available.    Blood loss: 5 mL external hemorrhage.  Unquantifiable pulmonary hemorrhage.    Specimens: Two 18-gauge x 1.3 cm cores     Findings:   Similar appearance of left lung nodule.    CT-guided biopsy of the nodule from a posterior approach medial to the scapula.  Unfortunately Mr. Roach moved his shoulder while under sedation, changing the access trajectory.    Progressive pulmonary hemorrhage and hemoptysis developed, with subsequent decrease in blood pressure from initially SBP 170s to 110s.  Oxygen saturation was maintained throughout.      Given hemoptysis and relative hypotension, biopsy was terminated early after two passes, he was repositioned to a left-side down orientation to protect the airway, and a rapid response was called.    He was transferred to ED for stabilization and admission.    Complications: Hemoptysis and relative hypotension as above.    Anesthesia: conscious sedation

## 2024-04-18 ENCOUNTER — APPOINTMENT (INPATIENT)
Dept: RADIOLOGY | Facility: HOSPITAL | Age: 79
DRG: 205 | End: 2024-04-18
Payer: MEDICARE

## 2024-04-18 LAB
ANION GAP SERPL CALCULATED.3IONS-SCNC: 5 MMOL/L (ref 4–13)
ATRIAL RATE: 56 BPM
BUN SERPL-MCNC: 44 MG/DL (ref 5–25)
CALCIUM SERPL-MCNC: 8.3 MG/DL (ref 8.4–10.2)
CHLORIDE SERPL-SCNC: 110 MMOL/L (ref 96–108)
CO2 SERPL-SCNC: 25 MMOL/L (ref 21–32)
CREAT SERPL-MCNC: 2 MG/DL (ref 0.6–1.3)
GFR SERPL CREATININE-BSD FRML MDRD: 30 ML/MIN/1.73SQ M
GLUCOSE SERPL-MCNC: 107 MG/DL (ref 65–140)
GLUCOSE SERPL-MCNC: 138 MG/DL (ref 65–140)
GLUCOSE SERPL-MCNC: 146 MG/DL (ref 65–140)
GLUCOSE SERPL-MCNC: 163 MG/DL (ref 65–140)
GLUCOSE SERPL-MCNC: 96 MG/DL (ref 65–140)
HCT VFR BLD AUTO: 24.3 % (ref 36.5–49.3)
HCT VFR BLD AUTO: 24.9 % (ref 36.5–49.3)
HGB BLD-MCNC: 7.8 G/DL (ref 12–17)
HGB BLD-MCNC: 7.9 G/DL (ref 12–17)
P AXIS: 79 DEGREES
POTASSIUM SERPL-SCNC: 4 MMOL/L (ref 3.5–5.3)
PR INTERVAL: 224 MS
QRS AXIS: 65 DEGREES
QRSD INTERVAL: 108 MS
QT INTERVAL: 498 MS
QTC INTERVAL: 480 MS
SODIUM SERPL-SCNC: 140 MMOL/L (ref 135–147)
T WAVE AXIS: 213 DEGREES
VENTRICULAR RATE: 56 BPM

## 2024-04-18 PROCEDURE — 99232 SBSQ HOSP IP/OBS MODERATE 35: CPT

## 2024-04-18 PROCEDURE — 93010 ELECTROCARDIOGRAM REPORT: CPT | Performed by: INTERNAL MEDICINE

## 2024-04-18 PROCEDURE — 99223 1ST HOSP IP/OBS HIGH 75: CPT | Performed by: INTERNAL MEDICINE

## 2024-04-18 PROCEDURE — 85014 HEMATOCRIT: CPT | Performed by: PHYSICIAN ASSISTANT

## 2024-04-18 PROCEDURE — 80048 BASIC METABOLIC PNL TOTAL CA: CPT | Performed by: PHYSICIAN ASSISTANT

## 2024-04-18 PROCEDURE — 85018 HEMOGLOBIN: CPT | Performed by: PHYSICIAN ASSISTANT

## 2024-04-18 PROCEDURE — 71045 X-RAY EXAM CHEST 1 VIEW: CPT

## 2024-04-18 PROCEDURE — 99233 SBSQ HOSP IP/OBS HIGH 50: CPT | Performed by: STUDENT IN AN ORGANIZED HEALTH CARE EDUCATION/TRAINING PROGRAM

## 2024-04-18 PROCEDURE — 82948 REAGENT STRIP/BLOOD GLUCOSE: CPT

## 2024-04-18 RX ADMIN — HYDRALAZINE HYDROCHLORIDE 25 MG: 25 TABLET ORAL at 21:29

## 2024-04-18 RX ADMIN — TORSEMIDE 20 MG: 20 TABLET ORAL at 09:34

## 2024-04-18 RX ADMIN — PANTOPRAZOLE SODIUM 40 MG: 40 TABLET, DELAYED RELEASE ORAL at 09:35

## 2024-04-18 RX ADMIN — PANTOPRAZOLE SODIUM 40 MG: 40 TABLET, DELAYED RELEASE ORAL at 21:29

## 2024-04-18 RX ADMIN — HYDRALAZINE HYDROCHLORIDE 25 MG: 25 TABLET ORAL at 09:35

## 2024-04-18 RX ADMIN — ALLOPURINOL 300 MG: 300 TABLET ORAL at 09:35

## 2024-04-18 RX ADMIN — PANCRELIPASE 24000 UNITS: 24000; 76000; 120000 CAPSULE, DELAYED RELEASE PELLETS ORAL at 16:13

## 2024-04-18 RX ADMIN — ATORVASTATIN CALCIUM 80 MG: 40 TABLET, FILM COATED ORAL at 16:12

## 2024-04-18 RX ADMIN — PANCRELIPASE 24000 UNITS: 24000; 76000; 120000 CAPSULE, DELAYED RELEASE PELLETS ORAL at 11:57

## 2024-04-18 RX ADMIN — AMLODIPINE BESYLATE 10 MG: 10 TABLET ORAL at 09:34

## 2024-04-18 RX ADMIN — CALCITRIOL CAPSULES 0.25 MCG 0.25 MCG: 0.25 CAPSULE ORAL at 09:34

## 2024-04-18 RX ADMIN — METOPROLOL SUCCINATE 12.5 MG: 25 TABLET, EXTENDED RELEASE ORAL at 09:36

## 2024-04-18 NOTE — PROGRESS NOTES
Progress Note -Interventional Radiology NP  Jay Roach Jr. 79 y.o. male MRN: [id number]  Unit/Bed#: -01 Encounter: [id number]    Assessment/Plan:    Jay Roach Jr., is a 79y male who presented yesterday as an outpatient for a lung biopsy.    Patient with hx of smoking and asbestos exposure with an enlarging left lung nodule that was suspicious for malignancy.    Patient developed hemoptysis w/pulmonary hemorrhage during the biopsy along with a decrease in blood pressure. No decrease in oxygen saturation. Patient was repositioned and a rapid response was called.    Patient was taken to the ER for hemodynamic stabilization and admission.    Patient currently hemodynamically stable. Hemoptysis resolved.   Latest Reference Range & Units 04/18/24 04:47 04/18/24 14:42   Hemoglobin 12.0 - 17.0 g/dL 7.8 (L) 7.9 (L)   (L): Data is abnormally low    Hgb appears to have stabilized. Patient does have hx of Anemia.  Baseline 8-10.    Patient continues on 4L O2NC sat 96%.    CXR this morning - No ptx.    Recommend continuing to monitor Hgb and for s/s bleeding.    Please feel free to reach out to IR with any questions or concerns.    Pulmonary continues to follow patient.    Subjective:  Patient reports that he is feeling better today. He does report that he did feel that he swallowed a lot of blood but has no nausea or vomiting. No coughing or shortness of breath.      Patient Active Problem List   Diagnosis    Atherosclerosis of native arteries of extremities with intermittent claudication, bilateral legs (HCC)    Stenosis of noncoronary bypass graft (HCC)    Asymptomatic bilateral carotid artery stenosis    S/P CABG (coronary artery bypass graft)    Hypertension    Renal artery stenosis, native, bilateral (HCC)    CAD (coronary artery disease)    Progressive angina (HCC)    Tension headache    Cigarette nicotine dependence with nicotine-induced disorder    Calcific tendinitis of right shoulder region     "Right shoulder pain    Sinus bradycardia    Type 2 diabetes mellitus with stage 4 chronic kidney disease, without long-term current use of insulin (HCC)    GERD (gastroesophageal reflux disease)    Hyperlipidemia    Persistent proteinuria, unspecified    CKD (chronic kidney disease) stage 4, GFR 15-29 ml/min (HCC)    Leukopenia    Hypomagnesemia    Renal cyst, left    Acute kidney injury superimposed on chronic kidney disease  (HCC)    Iron deficiency anemia    Chronic diastolic CHF (congestive heart failure) (HCC)    Elevated serum creatinine    S/P TAVR (transcatheter aortic valve replacement)    Benign hypertension with chronic kidney disease, stage III (HCC)    Secondary hyperparathyroidism of renal origin (HCC)    Anemia due to stage 4 chronic kidney disease  (HCC)    Hyperuricemia    Platelets decreased (HCC)    Vitamin D deficiency    GI bleed    Acute blood loss anemia    Acute kidney injury superimposed on stage 4 chronic kidney disease (HCC)    Melena    Chronic pancreatitis (HCC)    Peptic ulcer    History of GI bleed    Anemia    Mass of left lung    Post-procedural respiratory complication          Objective:    Vitals:  BP (!) 153/48   Pulse 61   Temp 97.9 °F (36.6 °C)   Resp 20   Ht 5' 10\" (1.778 m)   Wt 80.3 kg (177 lb 0.5 oz)   SpO2 94%   BMI 25.40 kg/m²   Body mass index is 25.4 kg/m².  Weight (last 2 days)       Date/Time Weight    04/18/24 0529 80.3 (177.03)            I/Os:    Intake/Output Summary (Last 24 hours) at 4/18/2024 1559  Last data filed at 4/18/2024 0155  Gross per 24 hour   Intake 360 ml   Output 0 ml   Net 360 ml       Invasive Devices       Peripheral Intravenous Line  Duration             Peripheral IV 04/17/24 Dorsal (posterior);Right Forearm 1 day                    Physical Exam:  Physical Exam  HENT:      Head: Normocephalic.      Mouth/Throat:      Mouth: Mucous membranes are moist.   Cardiovascular:      Rate and Rhythm: Normal rate.      Pulses: Normal pulses. " "  Pulmonary:      Effort: Pulmonary effort is normal.   Abdominal:      Palpations: Abdomen is soft.   Musculoskeletal:         General: Normal range of motion.      Cervical back: Normal range of motion.   Skin:     General: Skin is warm and dry.      Capillary Refill: Capillary refill takes less than 2 seconds.   Neurological:      Mental Status: He is alert and oriented to person, place, and time.   Psychiatric:         Mood and Affect: Mood normal.         Behavior: Behavior normal.                      Lab Results and Cultures:   CBC with diff:   Lab Results   Component Value Date    WBC 6.86 04/17/2024    HGB 7.9 (L) 04/18/2024    HCT 24.9 (L) 04/18/2024    MCV 93 04/17/2024     04/17/2024    RBC 3.20 (L) 04/17/2024    MCH 30.3 04/17/2024    MCHC 32.6 04/17/2024    RDW 15.4 (H) 04/17/2024    MPV 11.1 04/17/2024    NRBC 0 04/17/2024      BMP/CMP:  Lab Results   Component Value Date     11/22/2015    K 4.0 04/18/2024    K 4.1 11/22/2015     (H) 04/18/2024     11/22/2015    CO2 25 04/18/2024    CO2 25 03/23/2023    ANIONGAP 7 11/22/2015    BUN 44 (H) 04/18/2024    BUN 15 11/22/2015    CREATININE 2.00 (H) 04/18/2024    CREATININE 0.90 11/22/2015    GLUCOSE 163 (H) 03/23/2023    GLUCOSE 125 11/22/2015    CALCIUM 8.3 (L) 04/18/2024    CALCIUM 8.4 11/22/2015    AST 11 (L) 04/17/2024    ALT 6 (L) 04/17/2024    ALKPHOS 70 04/17/2024    EGFR 30 04/18/2024   ,     Coags:   Lab Results   Component Value Date    PTT 27 04/17/2024    PTT 27 10/28/2015    INR 1.01 04/17/2024    INR 1.14 10/30/2015   ,   Results from last 7 days   Lab Units 04/17/24  1201 04/17/24  0956   PTT seconds 27  --    INR  1.01 0.95        Lipid Panel: No results found for: \"CHOL\"  Lab Results   Component Value Date    HDL 52 05/05/2022     Lab Results   Component Value Date    HDL 52 05/05/2022     Lab Results   Component Value Date    LDLCALC 33 05/05/2022     Lab Results   Component Value Date    TRIG 50 05/05/2022 " "      HgbA1c:   Lab Results   Component Value Date    HGBA1C 6.6 (H) 03/05/2024    HGBA1C 6.1 (H) 12/09/2023    HGBA1C 6.7 (H) 08/31/2023       Blood Culture: No results found for: \"BLOODCX\",   Urinalysis:   Lab Results   Component Value Date    COLORU Light Yellow 01/08/2024    CLARITYU Clear 01/08/2024    SPECGRAV 1.013 01/08/2024    PHUR 5.5 01/08/2024    LEUKOCYTESUR Negative 01/08/2024    NITRITE Negative 01/08/2024    GLUCOSEU Negative 01/08/2024    KETONESU Negative 01/08/2024    BILIRUBINUR Negative 01/08/2024    BLOODU Negative 01/08/2024   ,   Urine Culture: No results found for: \"URINECX\",   Wound Culure:  No results found for: \"WOUNDCULT\"    VTE Pharmacologic Prophylaxis: Sequential compression device (Venodyne)       Thank you for allowing me to participate in the care of Jay Roach Jr.. Please don't hesitate to call, text, email, or TigerText with any questions.     This text is generated with voice recognition software. There may be translation, syntax,  or grammatical errors. If you have any questions, please contact the dictating provider.    "

## 2024-04-18 NOTE — CONSULTS
Consultation - Pulmonary Medicine   Jay Roach Jr. 79 y.o. male MRN: [id number]  Unit/Bed#: -01 Encounter: [id number]      Assessment/Plan:    Acute hypoxic respiratory failure  -Patient became hypoxic after periprocedural complication w/ hemoptysis, requiring up to 6 L to maintain saturation greater than 89%  -94% on 4 L, patient does not wear any supplemental O2 at home  -Continue to wean oxygen to maintain saturation greater than 89%  -Pulmonary hygiene encouraged: Deep breathing with cough, OOB as tolerated, incentive spirometry hourly    Hemoptysis  -Periprocedural complication due to patient movement during IR biopsy, patient started with hemoptysis and hypoxia  -Hgb 11.2-9.7-9.4-8.5-7.8, continue to trend    Lung mass  -CT chest in February 2024 showed enlargement of left lower lobe lung nodule 9 mm  -PET scan completed 04/11/2024 showed mild focal FDG activity  -Biopsy recommended  -Patient scheduled for biopsy on 04/17/2024, complicated by movement during biopsy and hemoptysis with hypoxia    Suspected COPD of unknown severity  -Patient has significant smoking history, endorses smoking 1 pack/day since he was a teenager  -CT chest 02/28/2024 shows severe pulmonary emphysema with scattered granulomata  -PFTs completed 04/14/2022 show mild obstructive airflow defect FEV1 71%  -Patient does not use any daily maintenance inhalers, or as needed inhalers denies any notable shortness of breath    Nicotine dependence  -60+ pack-year history of smoking  -Current active smoker  -Smoking cessation discussed  -NRT options reviewed-patient declined at this time    History of Present Illness   Physician Requesting Consult: Garrison ZARATE MD  Reason for Consult / Principal Problem: Hemoptysis and mass in left lung  Hx and PE limited by: Nothing  Chief Complaint: Was coughing up blood  HPI: Jay Roach Jr. is a 79 y.o. male with PMH of CAD s/p CABG, PAD with claudication, diabetes, CKD 4, chronic  anemia secondary to hyperparathyroidism, HTN, HDL, GERD and probable lung CA who presented to [facility] for scheduled IR biopsy biopsy of left lower lobe lung nodule.  During the procedure patient moved and subsequently started to have hemoptysis and hypoxia.  Evaluated by critical care and admitted to the fourth floor.  He had a little faint darker blood in tissue this morning but only 1 time otherwise no hemoptysis today.  He is still requiring 4 L NC to maintain saturation greater than 89%.  On evaluation patient was overall feeling well just a little anxious based on complication.    From pulmonary standpoint patient does not follow outpatient with our pulmonology team.  He has significant history of smoking a pack a day for the last 60+ years.  He also endorses working with asbestos for 3 summers when he was younger.  He denies any other environmental exposure.  Has had no recent sick contacts denies any pets    Inpatient consult to Pulmonology  Consult performed by: ZHANNA Arguello  Consult ordered by: Garrison ZARATE MD        Review of Systems    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) 02/24/2023    Basal cell carcinoma     right cheek    CAD (coronary artery disease)     Carotid stenosis, asymptomatic, bilateral     Chronic kidney disease     Colon polyp     Dependence on respirator (ventilator) status (HCC) 04/07/2023    Diabetes (HCC)     type 2, non-insulin dependent    Diabetes mellitus (HCC)     GERD (gastroesophageal reflux disease)     History of nephrolithiasis     Hyperlipidemia     Hypertension     Left foot pain 11/30/2022    PAD (peripheral artery disease) (HCC)     Severe aortic stenosis     Squamous cell skin cancer 11/22/2022    left superior helix      Past Surgical History:   Procedure Laterality Date    APPENDECTOMY      CARDIAC CATHETERIZATION N/A 05/05/2022    Procedure: CARDIAC RHC/LHC;  Surgeon: Arvin Sanchez DO;  Location: [facility];  Service: Cardiology    CARDIAC CATHETERIZATION N/A 05/05/2022    Procedure: Cardiac Coronary Angiogram;  Surgeon: Arvin Sanchez DO;  Location: [facility];  Service: Cardiology    CARDIAC CATHETERIZATION N/A 05/24/2022    Procedure: Cardiac pci;  Surgeon: Damien Jeter MD;  Location: [facility];  Service: Cardiology    CARDIAC CATHETERIZATION N/A 06/14/2022    Procedure: CARDIAC TAVR;  Surgeon: Nahum Vaz MD;  Location: [facility];  Service: Cardiology    COLECTOMY      COLONOSCOPY  2013    CORONARY ARTERY BYPASS GRAFT  2013    X 2    FEMORAL ARTERY - POPLITEAL ARTERY BYPASS GRAFT      HEMORRHOID SURGERY      IR AORTAGRAM WITH RUN-OFF  11/19/2018    IR AORTAGRAM WITH RUN-OFF  03/02/2023    IR BIOPSY LUNG  4/17/2024    IR LOWER EXTREMITY ANGIOGRAM  03/23/2023    MOHS SURGERY Left 01/11/2023    SCC left superior helix-Dr Guy    UT SLCTV CATHJ 3RD+ ORD SLCTV ABDL PEL/LXTR BRNCH Left 08/12/2016    Procedure: LEFT FEMORAL ARTERIOGRAM; BALLOON ANGIOPLASTY; SFA  AND FEMORAL AT VEIN GRAFT;  Surgeon: Nicholas Urena MD;  Location: [facility];  Service: Vascular    UT TEAEC W/WO PATCH GRAFT COMMON FEMORAL Right 03/23/2023    Procedure: Common femoral endarterectomy&antegrade intervention of SFA, popliteal artery w/ shockwave;  Surgeon: Eagle Higuera MD;  Location: [facility];  Service: Vascular    UT TRANSCATHETER TRANSAPICAL REPLACEMT AORTIC VALVE N/A 06/14/2022    Procedure: REPLACEMENT AORTIC VALVE TRANSCATHETER (TAVR) TRANSAPICAL 29MM IRVIN KYLER S3 ULTRA VALVE(ACCESS ON LEFT) ELANA;  Surgeon: Amarjit Gordon DO;  Location: [facility];  Service: Cardiac Surgery    SKIN CANCER EXCISION      TONSILLECTOMY AND ADENOIDECTOMY      UPPER GASTROINTESTINAL ENDOSCOPY        Social History   Social History     Substance and Sexual Activity   Alcohol Use Not Currently    Comment: rare     Social History     Substance and Sexual Activity   Drug Use No     Social History     Tobacco Use   Smoking Status Every Day    Current packs/day: 1.00    Average packs/day: 0.6 packs/day for 100.0 years (62.5 ttl pk-yrs)    Types: Cigarettes    Passive exposure: Current   Smokeless Tobacco Never     E-Cigarette/Vaping    E-Cigarette Use Never User      E-Cigarette/Vaping Substances    Nicotine No     THC No     CBD No     Flavoring No     Other No     Unknown No      Occupational History: 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    Family History:   Family History   Problem Relation Age of Onset    Heart attack Father     Other Sister         bypass and vlave replacement    Stroke Paternal Uncle     Arrhythmia Neg Hx     Asthma Neg Hx     Clotting disorder Neg Hx     Fainting Neg Hx     Anuerysm Neg Hx     Hypertension Neg Hx         unsure     Hyperlipidemia Neg Hx     Heart failure Neg Hx        Meds/Allergies   all current active meds have been reviewed and current meds:   Current Facility-Administered Medications   Medication Dose Route Frequency    acetaminophen (TYLENOL) tablet 650 mg  650 mg Oral Q6H PRN    allopurinol (ZYLOPRIM) tablet 300 mg  300 mg Oral Daily    aluminum-magnesium hydroxide-simethicone (MAALOX) oral suspension 30 mL  30 mL Oral Q6H PRN    amLODIPine (NORVASC) tablet 10 mg  10 mg Oral Daily    atorvastatin (LIPITOR) tablet 80 mg  80 mg Oral Daily With Dinner    calcitriol (ROCALTROL) capsule 0.25 mcg  0.25 mcg Oral Daily    hydrALAZINE (APRESOLINE) tablet 25 mg  25 mg Oral BID    metoprolol succinate (TOPROL-XL) 24 hr tablet 12.5 mg  12.5 mg Oral Daily    ondansetron (ZOFRAN) injection 4 mg  4 mg Intravenous Q6H PRN    pancrelipase (Lip-Prot-Amyl) (CREON) delayed release capsule 24,000 Units  24,000 Units Oral  "TID With Meals    pantoprazole (PROTONIX) EC tablet 40 mg  40 mg Oral BID    polyethylene glycol (MIRALAX) packet 17 g  17 g Oral Daily PRN    torsemide (DEMADEX) tablet 20 mg  20 mg Oral Daily     Facility-Administered Medications Ordered in Other Encounters   Medication Dose Route Frequency    sodium chloride 0.9 % infusion  75 mL/hr Intravenous Continuous       No Known Allergies    Objective   Vitals: Blood pressure (!) 152/48, pulse 60, temperature 98.2 °F (36.8 °C), resp. rate 17, height 5' 10\" (1.778 m), weight 80.3 kg (177 lb 0.5 oz), SpO2 95%.4 L,Body mass index is 25.4 kg/m².    Intake/Output Summary (Last 24 hours) at 4/18/2024 1309  Last data filed at 4/18/2024 0155  Gross per 24 hour   Intake 1360 ml   Output 0 ml   Net 1360 ml     Invasive Devices       Peripheral Intravenous Line  Duration             Peripheral IV 04/17/24 Dorsal (posterior);Right Forearm 1 day                    Physical Exam    Lab Results: I have personally reviewed pertinent lab results., ABG: No results found for: \"PHART\", \"ORF7TVK\", \"PO2ART\", \"BYT5WZI\", \"M9LXYGMC\", \"BEART\", \"SOURCE\", BNP: No results found for: \"BNP\", CBC:   Lab Results   Component Value Date    HGB 7.8 (L) 04/18/2024    HCT 24.3 (L) 04/18/2024   , CMP:   Lab Results   Component Value Date    SODIUM 140 04/18/2024    K 4.0 04/18/2024     (H) 04/18/2024    CO2 25 04/18/2024    BUN 44 (H) 04/18/2024    CREATININE 2.00 (H) 04/18/2024    CALCIUM 8.3 (L) 04/18/2024    EGFR 30 04/18/2024         Imaging Studies: I have personally reviewed pertinent reports.     Chest x-ray completed 04/18/2024    EKG, Pathology, and Other Studies: I have personally reviewed pertinent reports.     TTE completed 07/20/2022 showed EF of 65%, wall motion normal, diastolic function is mildly abnormal consistent with grade 1 relaxation    Pulmonary Results (PFTs, PSG): I have personally reviewed pertinent reports.     PFTs completed 04/14/2022 demonstrate moderate obstructive airflow " "defect, FEV1 71% FVC 83%, FEV1/FVC ratio 64%, DLCO 58%    Code Status: Level 3 - DNAR and DNI    Portions of the record may have been created with voice recognition software.  Occasional wrong word or \"sound a like\" substitutions may have occurred due to the inherent limitations of voice recognition software.  Read the chart carefully and recognize, using context, where substitutions have occurred.  "

## 2024-04-18 NOTE — ASSESSMENT & PLAN NOTE
Acute hypoxic respiratory failure; hypoxia to low 80s, requiring up to 6 L via NC with relative hypotension secondary to pulmonary hemorrhage, hemoptysis during the CT-guided biopsy  Symptoms have now resolved  Current hemodynamically stable.  Blood pressure stable.  O2 saturation 94 to 95% on room air.  Chest x-ray pending.  Pulmonology recommendation appreciated.

## 2024-04-18 NOTE — PLAN OF CARE
Problem: METABOLIC, FLUID AND ELECTROLYTES - ADULT  Goal: Electrolytes maintained within normal limits  Description: INTERVENTIONS:  - Monitor labs and assess patient for signs and symptoms of electrolyte imbalances  - Administer electrolyte replacement as ordered  - Monitor response to electrolyte replacements, including repeat lab results as appropriate  - Instruct patient on fluid and nutrition as appropriate  Outcome: Progressing     Problem: HEMATOLOGIC - ADULT  Goal: Maintains hematologic stability  Description: INTERVENTIONS  - Assess for signs and symptoms of bleeding or hemorrhage  - Monitor labs  - Administer supportive blood products/factors as ordered and appropriate  Outcome: Progressing

## 2024-04-18 NOTE — ASSESSMENT & PLAN NOTE
Wt Readings from Last 3 Encounters:   04/18/24 80.3 kg (177 lb 0.5 oz)   04/17/24 80.3 kg (177 lb 0.5 oz)   04/03/24 79.4 kg (175 lb)     Chronic HFpEF, compensated on presentation  Home regimen torsemide 40 mg daily  Continue home diuresis   Daily standing weights  Monitor I/Os, serial BMP  Sodium restriction   Last echo 6/8/23: LVEF 65% with G1DD

## 2024-04-18 NOTE — PLAN OF CARE
Problem: METABOLIC, FLUID AND ELECTROLYTES - ADULT  Goal: Electrolytes maintained within normal limits  Description: INTERVENTIONS:  - Monitor labs and assess patient for signs and symptoms of electrolyte imbalances  - Administer electrolyte replacement as ordered  - Monitor response to electrolyte replacements, including repeat lab results as appropriate  - Instruct patient on fluid and nutrition as appropriate  Outcome: Progressing  Goal: Fluid balance maintained  Description: INTERVENTIONS:  - Monitor labs   - Monitor I/O and WT  - Instruct patient on fluid and nutrition as appropriate  - Assess for signs & symptoms of volume excess or deficit  Outcome: Progressing  Goal: Glucose maintained within target range  Description: INTERVENTIONS:  - Monitor Blood Glucose as ordered  - Assess for signs and symptoms of hyperglycemia and hypoglycemia  - Administer ordered medications to maintain glucose within target range  - Assess nutritional intake and initiate nutrition service referral as needed  Outcome: Progressing     Problem: HEMATOLOGIC - ADULT  Goal: Maintains hematologic stability  Description: INTERVENTIONS  - Assess for signs and symptoms of bleeding or hemorrhage  - Monitor labs  - Administer supportive blood products/factors as ordered and appropriate  Outcome: Progressing     Problem: MUSCULOSKELETAL - ADULT  Goal: Maintain or return mobility to safest level of function  Description: INTERVENTIONS:  - Assess patient's ability to carry out ADLs; assess patient's baseline for ADL function and identify physical deficits which impact ability to perform ADLs (bathing, care of mouth/teeth, toileting, grooming, dressing, etc.)  - Assess/evaluate cause of self-care deficits   - Assess range of motion  - Assess patient's mobility  - Assess patient's need for assistive devices and provide as appropriate  - Encourage maximum independence but intervene and supervise when necessary  - Involve family in performance of  ADLs  - Assess for home care needs following discharge   - Consider OT consult to assist with ADL evaluation and planning for discharge  - Provide patient education as appropriate  Outcome: Progressing  Goal: Maintain proper alignment of affected body part  Description: INTERVENTIONS:  - Support, maintain and protect limb and body alignment  - Provide patient/ family with appropriate education  Outcome: Progressing

## 2024-04-18 NOTE — PROGRESS NOTES
[facility]  Progress Note  Name: Jay Harrison I  MRN: [id number]  Unit/Bed#: -01 I Date of Admission: 4/17/2024   Date of Service: 4/18/2024 I Hospital Day: 1    Assessment/Plan   * Mass of left lung  Assessment & Plan  Presented to outpatient IR for planned left lower lung mass biopsy; sharlene-procedural complication due to patient movement, leading to hemoptysis and hypoxia  Medical emergency called, ICU evaluated patient alongside IR    During encounter patient appears comfortable nondistressed pain  Hemoptysis improved almost resolved.  Currently decision 94 to 95% room air.  Chest x-rays are pending.  Follow with pending biopsy results.  Follow-up with pulmonology consultation.    Acute blood loss anemia  Assessment & Plan  In setting of lung biopsy complication; hgb 11.2 initially with decrease to 9.7  s/p nebulized TXA  Hemoptysis significantly improved almost resolved.  Currently hemoglobin stabilizing around 7.8-7.9 range.  Hold medical DVT prophylaxis, SCDs only   Blood consent obtained  Follow-up with pulmonology recommendation.    Post-procedural respiratory complication  Assessment & Plan  Acute hypoxic respiratory failure; hypoxia to low 80s, requiring up to 6 L via NC with relative hypotension secondary to pulmonary hemorrhage, hemoptysis during the CT-guided biopsy  Symptoms have now resolved  Current hemodynamically stable.  Blood pressure stable.  O2 saturation 94 to 95% on room air.  Chest x-ray pending.  Pulmonology recommendation appreciated.    Chronic pancreatitis (HCC)  Assessment & Plan  Continue creon [id number] with meals     Secondary hyperparathyroidism of renal origin (HCC)  Assessment & Plan  Continue calcitriol     Chronic diastolic CHF (congestive heart failure) (HCC)  Assessment & Plan  Wt Readings from Last 3 Encounters:   04/18/24 80.3 kg (177 lb 0.5 oz)   04/17/24 80.3 kg (177 lb 0.5 oz)   04/03/24 79.4 kg (175 lb)     Chronic HFpEF, compensated  on presentation  Home regimen torsemide 40 mg daily  Continue home diuresis   Daily standing weights  Monitor I/Os, serial BMP  Sodium restriction   Last echo 6/8/23: LVEF 65% with G1DD    CKD (chronic kidney disease) stage 4, GFR 15-29 ml/min ([facility])  Assessment & Plan  Creatinine within baseline, will monitor     Type 2 diabetes mellitus with stage 4 chronic kidney disease, without long-term current use of insulin ([facility])  Assessment & Plan  Hold amaryl  Start SSI # 2   Regular diet for now  Blood glucose monitoring ACHS  Hypoglycemia protocol  Adjust as needed                VTE Pharmacologic Prophylaxis: VTE Score: 8 Moderate Risk (Score 3-4) - Pharmacological DVT Prophylaxis Contraindicated. Sequential Compression Devices Ordered.    Mobility:   Basic Mobility Inpatient Raw Score: 23  -HLM Goal: 7: Walk 25 feet or more  -HLM Achieved: 7: Walk 25 feet or more      Patient Centered Rounds: I performed bedside rounds with nursing staff today.   Discussions with Specialists or Other Care Team Provider: Pulmonology      Total Time Spent on Date of Encounter in care of patient: 35 mins. This time was spent on one or more of the following: performing physical exam; counseling and coordination of care; obtaining or reviewing history; documenting in the medical record; reviewing/ordering tests, medications or procedures; communicating with other healthcare professionals and discussing with patient's family/caregivers.    Current Length of Stay: 1 day(s)  Current Patient Status: Inpatient   Certification Statement: The patient will continue to require additional inpatient hospital stay due to hemoptysis, trending hemoglobin level.  Discharge Plan: Anticipate discharge tomorrow to home.    Code Status: Level 3 - DNAR and DNI    Subjective:   Seen during a.m. rounds.  Patient appears comfortable not in distress.  Reports almost no episodes of coughing up blood since morning.  Denies chest pain, dyspnea, nausea, vomiting,  abdominal pain, dysuria, diarrhea, any other new complaints.  Overall reports feeling well.    Objective:     Vitals:   Temp (24hrs), Av °F (36.7 °C), Min:97.7 °F (36.5 °C), Max:98.2 °F (36.8 °C)    Temp:  [97.7 °F (36.5 °C)-98.2 °F (36.8 °C)] 97.9 °F (36.6 °C)  HR:  [54-69] 61  Resp:  [17-20] 20  BP: (149-175)/(47-59) 153/48  SpO2:  [94 %-96 %] 94 %  Body mass index is 25.4 kg/m².     Input and Output Summary (last 24 hours):     Intake/Output Summary (Last 24 hours) at 2024 1618  Last data filed at 2024 0155  Gross per 24 hour   Intake 360 ml   Output 0 ml   Net 360 ml       Physical Exam:   Physical Exam  Constitutional:       General: He is not in acute distress.     Appearance: Normal appearance. He is not ill-appearing, toxic-appearing or diaphoretic.   HENT:      Head: Normocephalic and atraumatic.   Eyes:      Pupils: Pupils are equal, round, and reactive to light.   Cardiovascular:      Rate and Rhythm: Normal rate.      Pulses: Normal pulses.   Pulmonary:      Effort: Pulmonary effort is normal. No respiratory distress.      Breath sounds: No wheezing.      Comments: O2 saturation 94 to 95% room air.  Abdominal:      General: Bowel sounds are normal. There is no distension.      Palpations: Abdomen is soft.      Tenderness: There is no abdominal tenderness.   Musculoskeletal:      Right lower leg: No edema.      Left lower leg: No edema.   Neurological:      Mental Status: He is alert and oriented to person, place, and time.   Psychiatric:         Mood and Affect: Mood normal.         Behavior: Behavior normal.         Additional Data:     Labs:  Results from last 7 days   Lab Units 24  1442 24  1351 24  1138   WBC Thousand/uL  --   --  6.86   HEMOGLOBIN g/dL 7.9*   < > 9.7*   HEMATOCRIT % 24.9*   < > 29.8*   PLATELETS Thousands/uL  --   --  151   SEGS PCT %  --   --  62   LYMPHO PCT %  --   --  25   MONO PCT %  --   --  7   EOS PCT %  --   --  6    < > = values in this  interval not displayed.     Results from last 7 days   Lab Units 04/18/24  0447 04/17/24  1138   SODIUM mmol/L 140 142   POTASSIUM mmol/L 4.0 4.3   CHLORIDE mmol/L 110* 109*   CO2 mmol/L 25 26   BUN mg/dL 44* 39*   CREATININE mg/dL 2.00* 2.38*   ANION GAP mmol/L 5 7   CALCIUM mg/dL 8.3* 8.7   ALBUMIN g/dL  --  3.6   TOTAL BILIRUBIN mg/dL  --  0.50   ALK PHOS U/L  --  70   ALT U/L  --  6*   AST U/L  --  11*   GLUCOSE RANDOM mg/dL 96 143*     Results from last 7 days   Lab Units 04/17/24  1201   INR  1.01     Results from last 7 days   Lab Units 04/18/24  1045 04/18/24  0730 04/17/24  1403   POC GLUCOSE mg/dl 163* 107 160*               Lines/Drains:  Invasive Devices       Peripheral Intravenous Line  Duration             Peripheral IV 04/17/24 Dorsal (posterior);Right Forearm 1 day                          Imaging: Reviewed radiology reports from this admission including: chest xray    Recent Cultures (last 7 days):         Last 24 Hours Medication List:   Current Facility-Administered Medications   Medication Dose Route Frequency Provider Last Rate    acetaminophen  650 mg Oral Q6H PRN Betty Holland, PA-C      allopurinol  300 mg Oral Daily Betty Holland, PA-C      aluminum-magnesium hydroxide-simethicone  30 mL Oral Q6H PRN Betty Holland, PA-C      amLODIPine  10 mg Oral Daily Betty Holland, PA-C      atorvastatin  80 mg Oral Daily With Dinner Betty Holland, PA-C      calcitriol  0.25 mcg Oral Daily Betty Holland, PA-C      hydrALAZINE  25 mg Oral BID Betty Holland, PA-C      metoprolol succinate  12.5 mg Oral Daily Betty Holland, PA-C      ondansetron  4 mg Intravenous Q6H PRN Betty Holland, PA-LIN      pancrelipase (Lip-Prot-Amyl)  24,000 Units Oral TID With Meals Betty Holland, PA-C      pantoprazole  40 mg Oral BID Betty Holland, PA-C      polyethylene glycol  17 g Oral Daily PRN Betty Holland, PA-C      torsemide  20 mg Oral Daily Bettyamelia Holland, PA-C       Facility-Administered Medications Ordered  in Other Encounters   Medication Dose Route Frequency Provider Last Rate    sodium chloride  75 mL/hr Intravenous Continuous Holland Huston MD          Today, Patient Was Seen By: Garrison Giordano MD    **Please Note: This note may have been constructed using a voice recognition system.**

## 2024-04-18 NOTE — ASSESSMENT & PLAN NOTE
In setting of lung biopsy complication; hgb 11.2 initially with decrease to 9.7  s/p nebulized TXA  Hemoptysis significantly improved almost resolved.  Currently hemoglobin stabilizing around 7.8-7.9 range.  Hold medical DVT prophylaxis, SCDs only   Blood consent obtained  Follow-up with pulmonology recommendation.

## 2024-04-18 NOTE — ASSESSMENT & PLAN NOTE
Presented to outpatient IR for planned left lower lung mass biopsy; sharlene-procedural complication due to patient movement, leading to hemoptysis and hypoxia  Medical emergency called, ICU evaluated patient alongside IR    During encounter patient appears comfortable nondistressed pain  Hemoptysis improved almost resolved.  Currently decision 94 to 95% room air.  Chest x-rays are pending.  Follow with pending biopsy results.  Follow-up with pulmonology consultation.

## 2024-04-19 VITALS
TEMPERATURE: 97.4 F | BODY MASS INDEX: 25.34 KG/M2 | RESPIRATION RATE: 16 BRPM | DIASTOLIC BLOOD PRESSURE: 45 MMHG | HEART RATE: 52 BPM | SYSTOLIC BLOOD PRESSURE: 144 MMHG | WEIGHT: 177.03 LBS | HEIGHT: 70 IN | OXYGEN SATURATION: 95 %

## 2024-04-19 LAB
ANION GAP SERPL CALCULATED.3IONS-SCNC: 7 MMOL/L (ref 4–13)
BUN SERPL-MCNC: 47 MG/DL (ref 5–25)
CALCIUM SERPL-MCNC: 8.6 MG/DL (ref 8.4–10.2)
CHLORIDE SERPL-SCNC: 109 MMOL/L (ref 96–108)
CO2 SERPL-SCNC: 25 MMOL/L (ref 21–32)
CREAT SERPL-MCNC: 2.18 MG/DL (ref 0.6–1.3)
ERYTHROCYTE [DISTWIDTH] IN BLOOD BY AUTOMATED COUNT: 15.2 % (ref 11.6–15.1)
GFR SERPL CREATININE-BSD FRML MDRD: 27 ML/MIN/1.73SQ M
GLUCOSE SERPL-MCNC: 108 MG/DL (ref 65–140)
GLUCOSE SERPL-MCNC: 108 MG/DL (ref 65–140)
GLUCOSE SERPL-MCNC: 151 MG/DL (ref 65–140)
HCT VFR BLD AUTO: 24.7 % (ref 36.5–49.3)
HGB BLD-MCNC: 7.9 G/DL (ref 12–17)
MCH RBC QN AUTO: 29.3 PG (ref 26.8–34.3)
MCHC RBC AUTO-ENTMCNC: 32 G/DL (ref 31.4–37.4)
MCV RBC AUTO: 92 FL (ref 82–98)
PLATELET # BLD AUTO: 132 THOUSANDS/UL (ref 149–390)
PMV BLD AUTO: 10.9 FL (ref 8.9–12.7)
POTASSIUM SERPL-SCNC: 3.7 MMOL/L (ref 3.5–5.3)
RBC # BLD AUTO: 2.7 MILLION/UL (ref 3.88–5.62)
SODIUM SERPL-SCNC: 141 MMOL/L (ref 135–147)
WBC # BLD AUTO: 4.78 THOUSAND/UL (ref 4.31–10.16)

## 2024-04-19 PROCEDURE — 99232 SBSQ HOSP IP/OBS MODERATE 35: CPT | Performed by: INTERNAL MEDICINE

## 2024-04-19 PROCEDURE — 85027 COMPLETE CBC AUTOMATED: CPT | Performed by: STUDENT IN AN ORGANIZED HEALTH CARE EDUCATION/TRAINING PROGRAM

## 2024-04-19 PROCEDURE — 82948 REAGENT STRIP/BLOOD GLUCOSE: CPT

## 2024-04-19 PROCEDURE — 99239 HOSP IP/OBS DSCHRG MGMT >30: CPT | Performed by: STUDENT IN AN ORGANIZED HEALTH CARE EDUCATION/TRAINING PROGRAM

## 2024-04-19 PROCEDURE — 80048 BASIC METABOLIC PNL TOTAL CA: CPT | Performed by: STUDENT IN AN ORGANIZED HEALTH CARE EDUCATION/TRAINING PROGRAM

## 2024-04-19 RX ADMIN — PANCRELIPASE 24000 UNITS: 24000; 76000; 120000 CAPSULE, DELAYED RELEASE PELLETS ORAL at 11:38

## 2024-04-19 RX ADMIN — AMLODIPINE BESYLATE 10 MG: 10 TABLET ORAL at 08:07

## 2024-04-19 RX ADMIN — METOPROLOL SUCCINATE 12.5 MG: 25 TABLET, EXTENDED RELEASE ORAL at 08:06

## 2024-04-19 RX ADMIN — HYDRALAZINE HYDROCHLORIDE 25 MG: 25 TABLET ORAL at 08:07

## 2024-04-19 RX ADMIN — ALLOPURINOL 300 MG: 300 TABLET ORAL at 08:07

## 2024-04-19 RX ADMIN — CALCITRIOL CAPSULES 0.25 MCG 0.25 MCG: 0.25 CAPSULE ORAL at 08:07

## 2024-04-19 RX ADMIN — PANTOPRAZOLE SODIUM 40 MG: 40 TABLET, DELAYED RELEASE ORAL at 08:07

## 2024-04-19 RX ADMIN — PANCRELIPASE 24000 UNITS: 24000; 76000; 120000 CAPSULE, DELAYED RELEASE PELLETS ORAL at 07:24

## 2024-04-19 RX ADMIN — TORSEMIDE 20 MG: 20 TABLET ORAL at 08:07

## 2024-04-19 NOTE — DISCHARGE INSTR - AVS FIRST PAGE
Recommend close follow-up with primary care provider within 1 week of discharge.  Recommend to have repeat blood work for CBC with primary care provider in 1 week to trend hemoglobin open  Follow-up with primary care provider and/or pulmonology for pending biopsy /pathology results.

## 2024-04-19 NOTE — NURSING NOTE
Discharge order placed, discharge instructions reviewed, answered all questions. IV's removed, no adverse reactions noted. Pt left floor by ambulation, with brother and all belongings.

## 2024-04-19 NOTE — DISCHARGE SUMMARY
[facility]  Discharge- Jay Roach Jr. 1945, 79 y.o. male MRN: [id number]  Unit/Bed#: -Ever Encounter: [id number]  Primary Care Provider: Simón Hadley MD   Date and time admitted to hospital: 4/17/2024 11:32 AM    * Mass of left lung  Assessment & Plan  Presented to outpatient IR for planned left lower lung mass biopsy; sharlene-procedural complication due to patient movement, leading to hemoptysis and hypoxia  Medical emergency called, ICU evaluated patient alongside IR    Currently during my encounter patient appears comfortable not in distress.  Currently O2 saturation 95 to 96% room air.  Chest x-ray pulmonary with left lower lobe opacity likely sequela of recent biopsy.  Cleared by pulmonology for discharge.  Recommend to have repeat chest x-ray in 6 weeks with primary care provider to monitor resolution.  Patient is in agreement with all plan.  Biopsy result pending at the time of the discharge and recommend outpatient follow-up with PCP/pulmonology.      Acute blood loss anemia  Assessment & Plan  In setting of lung biopsy complication; hgb 11.2 initially with decrease to 9.7  s/p nebulized TXA  Hemoptysis significantly improved almost resolved.  Currently hemoglobin stabilizing around 7.8-7.9 range.  Currently hemoglobin remained stable around 7.9.  Stable for discharge.  Recommended repeat CBC in 1 week with primary care provider.  Cleared by pulmonology for discharge.  Patient is in agreement with above plan.    Post-procedural respiratory complication  Assessment & Plan  Acute hypoxic respiratory failure; hypoxia to low 80s, requiring up to 6 L via NC with relative hypotension secondary to pulmonary hemorrhage, hemoptysis during the CT-guided biopsy    Symptoms have now resolved  Current hemodynamically stable.  Blood pressure stable.  O2 saturation 94 to 95% on room air.  Chest x-ray pending.  Pulmonology recommendation appreciated.    Chronic pancreatitis  ([facility])  Assessment & Plan  Continue creon [id number] with meals     Secondary hyperparathyroidism of renal origin ([facility])  Assessment & Plan  Continue calcitriol     Chronic diastolic CHF (congestive heart failure) ([facility])  Assessment & Plan  Wt Readings from Last 3 Encounters:   04/18/24 80.3 kg (177 lb 0.5 oz)   04/17/24 80.3 kg (177 lb 0.5 oz)   04/03/24 79.4 kg (175 lb)     Chronic HFpEF, compensated on presentation  Home regimen torsemide 40 mg daily  Continue home diuresis   Daily standing weights  Monitor I/Os, serial BMP  Sodium restriction   Last echo 6/8/23: LVEF 65% with G1DD    CKD (chronic kidney disease) stage 4, GFR 15-29 ml/min ([facility])  Assessment & Plan  Creatinine within baseline, will monitor     Type 2 diabetes mellitus with stage 4 chronic kidney disease, without long-term current use of insulin ([facility])  Assessment & Plan  Resume home regimen upon discharge.        Discharging Physician / Practitioner: Garrison Giordano MD  PCP: Simón Hadley MD  Admission Date:   Admission Orders (From admission, onward)       Ordered        04/17/24 1329  INPATIENT ADMISSION  Once                          Discharge Date: 04/19/24    Medical Problems       Resolved Problems  Date Reviewed: 4/19/2024   None         Consultations During Hospital Stay:  Pulmonology    Procedures Performed:   Patient had IR guided/CT-guided lung biopsy complicated by hemorrhage, hemoptysis.      Reason for Admission: Shortness of breath, hemoptysis due to IR guided lung biopsy complication.    Hospital Course:     Jay Roach Jr. is a 79 y.o. male patient with past medical history of CAD, PAD, diabetes, CKD, anemia, chronic pancreatitis, CHF, who originally presented to the hospital on 4/17/2024 due to complaining of shortness of breath, hemoptysis.  Patient was getting outpatient IR guided lung biopsy however due to patient movement during procedure he had complication of hypoxia due to pulmonary hemorrhage, hemoptysis.  S/p nebulized  "TXA.  Patient was hospitalized under observation per IR recommendation.  Patient reports that he had stopped taking Plavix 5 days prior to procedure.  Chest x-ray report noted with no pneumothorax, left lower lobe opacities likely sequela of the recent biopsy.  Patient has history of anemia with baseline hemoglobin around 8-9 range.  Hemoglobin remained stable around 7.9.  No further episode of hemoptysis reported this morning.  Currently residual 96% on room air.  Seen by IR and pulmonology and currently cleared for discharge.  Patient has been ambulating without any new complaints.  Recommend to repeat CBC with primary care provider in 1 week to trend hemoglobin level.  Also recommended outpatient follow-up with PCP/primary pulmonology for pending IR biopsy results.  Patient is in agreement with above plan.  No other events reported.  Refer to earlier notes for further clarification.        Please see above list of diagnoses and related plan for additional information.     Condition at Discharge: good     Discharge Day Visit / Exam:     Subjective: Seen during a.m. rounds.  Patient seen ambulating in his room without any complaints.  Residual 95 to 96% on room air.  Denies any further episode of hemoptysis.  No other events reported.  Eager to go home.    Vitals: Blood Pressure: (!) 144/45 (04/19/24 0810)  Pulse: (!) 52 (04/19/24 0810)  Temperature: (!) 97.4 °F (36.3 °C) (04/18/24 2126)  Temp Source: Oral (04/17/24 1134)  Respirations: 16 (04/19/24 0715)  Height: 5' 10\" (177.8 cm) (04/18/24 0529)  Weight - Scale: 80.3 kg (177 lb 0.5 oz) (04/18/24 0529)  SpO2: 95 % (04/19/24 0810)  Exam:   Physical Exam  Constitutional:       General: He is not in acute distress.     Appearance: Normal appearance. He is not ill-appearing, toxic-appearing or diaphoretic.   HENT:      Head: Normocephalic and atraumatic.   Eyes:      Pupils: Pupils are equal, round, and reactive to light.   Cardiovascular:      Rate and Rhythm: Normal " rate.      Pulses: Normal pulses.   Pulmonary:      Effort: Pulmonary effort is normal. No respiratory distress.      Breath sounds: No wheezing.      Comments: O2 saturation 94 to 95% room air.  Abdominal:      General: Bowel sounds are normal. There is no distension.      Palpations: Abdomen is soft.      Tenderness: There is no abdominal tenderness.   Musculoskeletal:      Right lower leg: No edema.      Left lower leg: No edema.   Neurological:      Mental Status: He is alert and oriented to person, place, and time.   Psychiatric:         Mood and Affect: Mood normal.         Behavior: Behavior normal.         Discharge instructions/Information to patient and family:   See after visit summary for information provided to patient and family.      Provisions for Follow-Up Care:  See after visit summary for information related to follow-up care and any pertinent home health orders.      Disposition:     Home        Planned Readmission:      Discharge Statement:  I spent 35 minutes discharging the patient. This time was spent on the day of discharge. I had direct contact with the patient on the day of discharge. Greater than 50% of the total time was spent examining patient, answering all patient questions, arranging and discussing plan of care with patient as well as directly providing post-discharge instructions.  Additional time then spent on discharge activities.    Discharge Medications:  See after visit summary for reconciled discharge medications provided to patient and family.      ** Please Note: This note has been constructed using a voice recognition system **

## 2024-04-19 NOTE — PROGRESS NOTES
Progress Note - Pulmonary   Jay Roach Jr. 79 y.o. male MRN: [id number]  Unit/Bed#: -01 Encounter: [id number]    Assessment/Plan:    Acute hypoxic respiratory failure  -Patient became hypoxic after periprocedural complication w/ hemoptysis, requiring up to 6 L to maintain saturation greater than 89%  -96% on 1 L, patient does not wear any supplemental O2 at home  -Continue to wean oxygen to maintain saturation greater than 89%  -Pulmonary hygiene encouraged: Deep breathing with cough, OOB as tolerated, incentive spirometry hourly     Acute Hemoptysis  -Periprocedural complication due to patient movement during IR biopsy, patient started with hemoptysis and hypoxia  -Hgb 11.2-9.7-9.4-8.5-7.8-7.9, continue to trend  -denies any hemoptysis since yesterday     Lung mass  -CT chest in February 2024 showed enlargement of left lower lobe lung nodule 9 mm  -PET scan completed 04/11/2024 showed mild focal FDG activity,Biopsy recommended  -Patient scheduled for biopsy on 04/17/2024, complicated by movement during biopsy and hemoptysis with hypoxia  -Biopsy results pending     Suspected COPD of unknown severity  -Patient has significant smoking history, endorses smoking 1 pack/day since he was a teenager  -CT chest 02/28/2024 shows severe pulmonary emphysema with scattered granulomata  -PFTs completed 04/14/2022 show mild obstructive airflow defect FEV1 71%  -Patient does not use any daily maintenance inhalers, or as needed inhalers denies any notable shortness of breath  -Discharged with albuterol 90 mcg/ACT inhaler 1 puff every 6 hours as needed     Nicotine dependence  -60+ pack-year history of smoking  -Current active smoker  -Smoking cessation discussed  -NRT options reviewed-patient declined at this time    Chief Complaint:    I feel ok    Subjective:    Patient seen and examined at bedside.  Found laying in bed sleeping.  States he did not have a great night of sleep, just due to not being at home.  His  "overall respiratory status appears to have improved.  He is on 1 L NC saturation at 97%.  He denies any hemoptysis since yesterday afternoon.  He denies any fever chills night sweats or chest pain    Objective:    Vitals: Blood pressure (!) 146/45, pulse (!) 54, temperature (!) 97.4 °F (36.3 °C), resp. rate 16, height 5' 10\" (1.778 m), weight 80.3 kg (177 lb 0.5 oz), SpO2 96%. 1L NC,Body mass index is 25.4 kg/m².      Intake/Output Summary (Last 24 hours) at 4/19/2024 0757  Last data filed at 4/19/2024 0401  Gross per 24 hour   Intake 480 ml   Output 1 ml   Net 479 ml       Invasive Devices       Peripheral Intravenous Line  Duration             Peripheral IV 04/17/24 Dorsal (posterior);Right Forearm 1 day                    Physical Exam:     Physical Exam  Vitals reviewed.   Constitutional:       General: He is not in acute distress.     Appearance: He is not ill-appearing.   HENT:      Head: Normocephalic and atraumatic.      Right Ear: External ear normal.      Left Ear: External ear normal.      Nose: Nose normal.      Mouth/Throat:      Mouth: Mucous membranes are moist.      Pharynx: Oropharynx is clear.   Eyes:      Extraocular Movements: Extraocular movements intact.      Pupils: Pupils are equal, round, and reactive to light.   Cardiovascular:      Rate and Rhythm: Normal rate and regular rhythm.      Pulses: Normal pulses.      Heart sounds: Normal heart sounds.   Pulmonary:      Effort: Pulmonary effort is normal.      Breath sounds: Wheezing present.      Comments: Faint expiratory wheezes noted  Abdominal:      General: Bowel sounds are normal.      Tenderness: There is no abdominal tenderness.   Musculoskeletal:         General: Normal range of motion.      Cervical back: Normal range of motion and neck supple.      Right lower leg: No edema.      Left lower leg: No edema.   Skin:     General: Skin is warm and dry.   Neurological:      Mental Status: He is alert and oriented to person, place, and " "time.   Psychiatric:         Mood and Affect: Mood normal.         Behavior: Behavior normal.         Thought Content: Thought content normal.         Judgment: Judgment normal.         Labs: I have personally reviewed pertinent lab results., ABG: No results found for: \"PHART\", \"EKN2LQV\", \"PO2ART\", \"NKE8JXW\", \"C0XKJYSX\", \"BEART\", \"SOURCE\", BNP: No results found for: \"BNP\", CBC:   Lab Results   Component Value Date    WBC 4.78 04/19/2024    HGB 7.9 (L) 04/19/2024    HCT 24.7 (L) 04/19/2024    MCV 92 04/19/2024     (L) 04/19/2024    RBC 2.70 (L) 04/19/2024    MCH 29.3 04/19/2024    MCHC 32.0 04/19/2024    RDW 15.2 (H) 04/19/2024    MPV 10.9 04/19/2024   , CMP: No results found for: \"SODIUM\", \"K\", \"CL\", \"CO2\", \"ANIONGAP\", \"BUN\", \"CREATININE\", \"GLUCOSE\", \"CALCIUM\", \"AST\", \"ALT\", \"ALKPHOS\", \"PROT\", \"BILITOT\", \"EGFR\"      Imaging and other studies: I have personally reviewed pertinent reports.    Chest Xray 04/18/2024 no pneumothorax.  Left lower lobe opacity likely the sequela of recent biopsy  "

## 2024-04-20 LAB
ABO GROUP BLD BPU: NORMAL
BPU ID: NORMAL
CROSSMATCH: NORMAL
UNIT DISPENSE STATUS: NORMAL
UNIT PRODUCT CODE: NORMAL
UNIT PRODUCT VOLUME: 350 ML
UNIT RH: NORMAL

## 2024-04-22 ENCOUNTER — TELEPHONE (OUTPATIENT)
Age: 79
End: 2024-04-22

## 2024-04-22 ENCOUNTER — TRANSITIONAL CARE MANAGEMENT (OUTPATIENT)
Dept: FAMILY MEDICINE CLINIC | Facility: CLINIC | Age: 79
End: 2024-04-22

## 2024-04-22 DIAGNOSIS — Z71.89 COMPLEX CARE COORDINATION: Primary | ICD-10-CM

## 2024-04-22 DIAGNOSIS — D64.9 ANEMIA, UNSPECIFIED TYPE: Primary | ICD-10-CM

## 2024-04-22 NOTE — TELEPHONE ENCOUNTER
Patient called to schedule a TCM after being discharged from the hospital on 4/19.  Spoke to practice.  They will give him a call back to schedule.  Patient verbalized understanding.  Patient also stated he went into the hospital for a lung biopsy and had massive bleeding after.  When he was discharged his hemoglobin was 7.9.  He is still coughing up blood and concerned that his hemoglobin might be even lower.

## 2024-04-22 NOTE — TELEPHONE ENCOUNTER
I spoke with patient and scheduled an appointment for Wednesday and he is asking for a CBC to be ordered due to his hemoglobin was 7.9 upon discharge that he can have done prior to his appointment.     I will call patient when order is in chart and he will go to the lab first thing tomorrow morning.

## 2024-04-23 ENCOUNTER — APPOINTMENT (OUTPATIENT)
Dept: LAB | Facility: MEDICAL CENTER | Age: 79
End: 2024-04-23
Payer: MEDICARE

## 2024-04-23 ENCOUNTER — TELEPHONE (OUTPATIENT)
Dept: HEMATOLOGY ONCOLOGY | Facility: CLINIC | Age: 79
End: 2024-04-23

## 2024-04-23 ENCOUNTER — TELEPHONE (OUTPATIENT)
Dept: CARDIAC SURGERY | Facility: CLINIC | Age: 79
End: 2024-04-23

## 2024-04-23 DIAGNOSIS — D64.9 ANEMIA, UNSPECIFIED TYPE: ICD-10-CM

## 2024-04-23 LAB — GLUCOSE SERPL-MCNC: 113 MG/DL (ref 65–140)

## 2024-04-23 PROCEDURE — 88341 IMHCHEM/IMCYTCHM EA ADD ANTB: CPT | Performed by: PATHOLOGY

## 2024-04-23 PROCEDURE — 88342 IMHCHEM/IMCYTCHM 1ST ANTB: CPT | Performed by: PATHOLOGY

## 2024-04-23 PROCEDURE — 88305 TISSUE EXAM BY PATHOLOGIST: CPT | Performed by: PATHOLOGY

## 2024-04-23 PROCEDURE — 88333 PATH CONSLTJ SURG CYTO XM 1: CPT | Performed by: PATHOLOGY

## 2024-04-23 NOTE — TELEPHONE ENCOUNTER
Appointment Confirmation   Who are you speaking with? Patient   If it is not the patient, are they listed on an active communication consent form? N/A   Which provider is the appointment scheduled with?  Dr. Schofield   When is the appointment scheduled?  Please list date and time 4/25/24 2:20 PM   At which location is the appointment scheduled to take place? [place]   Did caller verbalize understanding of appointment details? Yes      radha

## 2024-04-23 NOTE — TELEPHONE ENCOUNTER
Patient called the office to align that was not the hopeline to discuss results of IR biopsy.  Attempted to call patient back.  Patient did not .  Left voicemail and provided phone number. Patient has an appointment this week on 4/25/24 with Dr. Schofield.

## 2024-04-24 ENCOUNTER — OFFICE VISIT (OUTPATIENT)
Dept: FAMILY MEDICINE CLINIC | Facility: CLINIC | Age: 79
End: 2024-04-24
Payer: MEDICARE

## 2024-04-24 ENCOUNTER — PATIENT OUTREACH (OUTPATIENT)
Dept: FAMILY MEDICINE CLINIC | Facility: CLINIC | Age: 79
End: 2024-04-24

## 2024-04-24 VITALS
OXYGEN SATURATION: 96 % | HEIGHT: 70 IN | RESPIRATION RATE: 18 BRPM | BODY MASS INDEX: 25.2 KG/M2 | TEMPERATURE: 97.7 F | WEIGHT: 176 LBS | DIASTOLIC BLOOD PRESSURE: 64 MMHG | SYSTOLIC BLOOD PRESSURE: 136 MMHG | HEART RATE: 67 BPM

## 2024-04-24 DIAGNOSIS — I25.10 CORONARY ARTERY DISEASE INVOLVING NATIVE CORONARY ARTERY OF NATIVE HEART WITHOUT ANGINA PECTORIS: ICD-10-CM

## 2024-04-24 DIAGNOSIS — N18.4 ACUTE RENAL FAILURE SUPERIMPOSED ON STAGE 4 CHRONIC KIDNEY DISEASE, UNSPECIFIED ACUTE RENAL FAILURE TYPE (HCC): ICD-10-CM

## 2024-04-24 DIAGNOSIS — D50.9 IRON DEFICIENCY ANEMIA, UNSPECIFIED IRON DEFICIENCY ANEMIA TYPE: ICD-10-CM

## 2024-04-24 DIAGNOSIS — D62 ACUTE BLOOD LOSS ANEMIA: Primary | ICD-10-CM

## 2024-04-24 DIAGNOSIS — E11.22 TYPE 2 DIABETES MELLITUS WITH STAGE 4 CHRONIC KIDNEY DISEASE, WITHOUT LONG-TERM CURRENT USE OF INSULIN (HCC): ICD-10-CM

## 2024-04-24 DIAGNOSIS — N17.9 ACUTE RENAL FAILURE SUPERIMPOSED ON STAGE 4 CHRONIC KIDNEY DISEASE, UNSPECIFIED ACUTE RENAL FAILURE TYPE (HCC): ICD-10-CM

## 2024-04-24 DIAGNOSIS — I70.213 ATHEROSCLEROSIS OF NATIVE ARTERIES OF EXTREMITIES WITH INTERMITTENT CLAUDICATION, BILATERAL LEGS (HCC): Chronic | ICD-10-CM

## 2024-04-24 DIAGNOSIS — N18.4 TYPE 2 DIABETES MELLITUS WITH STAGE 4 CHRONIC KIDNEY DISEASE, WITHOUT LONG-TERM CURRENT USE OF INSULIN (HCC): ICD-10-CM

## 2024-04-24 DIAGNOSIS — E78.2 MIXED HYPERLIPIDEMIA: ICD-10-CM

## 2024-04-24 PROCEDURE — 99496 TRANSJ CARE MGMT HIGH F2F 7D: CPT | Performed by: FAMILY MEDICINE

## 2024-04-24 NOTE — PROGRESS NOTES
Name: Jay Roach Jr.      : 1945      MRN: [id number]  Encounter Provider: Simón Hadley MD  Encounter Date: 2024   Encounter department: [facility]    Assessment & Plan     1. Acute blood loss anemia  Assessment & Plan:  Patient is stable  and will continue present plan of care and reassess at next routine visit. All questions about this problem from patient were answered today.     Orders:  -     CBC and differential; Future  -     Basic metabolic panel; Future    2. Acute renal failure superimposed on stage 4 chronic kidney disease, unspecified acute renal failure type (HCC)  Assessment & Plan:  Lab Results   Component Value Date    EGFR 27 2024    EGFR 30 2024    EGFR 24 2024    CREATININE 2.18 (H) 2024    CREATININE 2.00 (H) 2024    CREATININE 2.38 (H) 2024   Pt to avoid NSAIDs and any IV dyes. Patient to follow up eoither with nephrology or  with us for  further  monitoring of  renal function.       3. Atherosclerosis of native arteries of extremities with intermittent claudication, bilateral legs (HCC)  Assessment & Plan:  Patient is stable  and will continue present plan of care and reassess at next routine visit. All questions about this problem from patient were answered today.       4. Coronary artery disease involving native coronary artery of native heart without angina pectoris    5. Mixed hyperlipidemia  Assessment & Plan:  Patient  is stable with current medication and we discussed a low fat low cholesterol diet. Weight loss also discussed for this will help lower cholesterol also. Recheck lipids in 6 months.       6. Iron deficiency anemia, unspecified iron deficiency anemia type    7. Type 2 diabetes mellitus with stage 4 chronic kidney disease, without long-term current use of insulin (HCC)  Assessment & Plan:  Patient is stable with current meds and discussed a low carb diet. Pt  recommended to see eye doctor  and foot doctor for routine screening as per protocol. Recheck A1C  and Cr in 3 months. Patient questions answered today about this condtion.   Lab Results   Component Value Date    HGBA1C 6.6 (H) 03/05/2024           Falls Plan of Care: balance, strength, and gait training instructions were provided. Home safety education provided.       Subjective     79-year-old male here today for checkup for TCM visit from recent hospital stay from bleeding after biopsy of his lung nodule.  Patient had a decrease in his hemoglobin and is here for follow-up.  Patient with a repeat CBC showed a hemoglobin increased up to 9.  Will see about increasing his CBC recheck up until this Monday.  Patient is still bringing up some blood with some coughing which may be for a while yet.  Patient was recently diagnosed with squamous cell carcinoma of the lung and is going to be seeing the chest surgeon tomorrow.  Patient has a long history of coronary disease as well as peripheral vascular disease with numerous problems with circulation.  Patient also with chronic kidney disease.  We will see about rechecking his CBC this coming Monday to make sure that hemoglobin is stable patient is taking iron every other day due to its predilection for constipation with the patient.      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 ([facility]) 09/10/2021   • 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 ([facility]) 02/24/2023   • Basal cell carcinoma     right cheek   • CAD (coronary artery disease)    • Carotid stenosis, asymptomatic, bilateral    • Chronic kidney disease    • Colon polyp    • Dependence on respirator (ventilator) status ([facility]) 04/07/2023   • Diabetes ([facility])     type 2, non-insulin dependent   • Diabetes mellitus ([facility])    • GERD (gastroesophageal reflux disease)    • History of nephrolithiasis    •  Hyperlipidemia    • Hypertension    • Left foot pain 11/30/2022   • PAD (peripheral artery disease) ([facility])    • Severe aortic stenosis    • Squamous cell skin cancer 11/22/2022    left superior helix     Past Surgical History:   Procedure Laterality Date   • APPENDECTOMY     • CARDIAC CATHETERIZATION N/A 05/05/2022    Procedure: CARDIAC RHC/LHC;  Surgeon: Arvin Sanchez DO;  Location: [facility];  Service: Cardiology   • CARDIAC CATHETERIZATION N/A 05/05/2022    Procedure: Cardiac Coronary Angiogram;  Surgeon: Arvin Sanchez DO;  [facility];  Service: Cardiology   • CARDIAC CATHETERIZATION N/A 05/24/2022    Procedure: Cardiac pci;  Surgeon: Damien Jeter MD;  [facility]: [facility];  Service: Cardiology   • CARDIAC CATHETERIZATION N/A 06/14/2022    Procedure: CARDIAC TAVR;  Surgeon: Nahum Vaz MD;  Location: [facility];  Service: Cardiology   • COLECTOMY     • COLONOSCOPY  2013   • CORONARY ARTERY BYPASS GRAFT  2013    X 2   • FEMORAL ARTERY - POPLITEAL ARTERY BYPASS GRAFT     • HEMORRHOID SURGERY     • IR AORTAGRAM WITH RUN-OFF  11/19/2018   • IR AORTAGRAM WITH RUN-OFF  03/02/2023   • IR BIOPSY LUNG  4/17/2024   • IR LOWER EXTREMITY ANGIOGRAM  03/23/2023   • MOHS SURGERY Left 01/11/2023    SCC left superior helix-Dr Tovar   • MT SLCTV CATHJ 3RD+ ORD SLCTV ABDL PEL/LXTR BRNCH Left 08/12/2016    Procedure: LEFT FEMORAL ARTERIOGRAM; BALLOON ANGIOPLASTY; SFA  AND FEMORAL AT VEIN GRAFT;  Surgeon: Nicholas Urena MD;  Location: [facility];  Service: Vascular   • MT TEAEC W/WO PATCH GRAFT COMMON FEMORAL Right 03/23/2023    Procedure: Common femoral endarterectomy&antegrade intervention of SFA, popliteal artery w/ shockwave;  Surgeon: Eagle Higuera MD;  Location: [facility];  Service: Vascular   • MT TRANSCATHETER TRANSAPICAL REPLACEMT AORTIC VALVE N/A 06/14/2022    Procedure: REPLACEMENT AORTIC VALVE TRANSCATHETER (TAVR) TRANSAPICAL 29MM IRVIN KYLER S3 ULTRA  VALVE(ACCESS ON LEFT) ELANA;  Surgeon: Amarjit Gordon DO;  Location: [facility];  Service: Cardiac Surgery   • SKIN CANCER EXCISION     • TONSILLECTOMY AND ADENOIDECTOMY     • UPPER GASTROINTESTINAL ENDOSCOPY       Family History   Problem Relation Age of Onset   • Heart attack Father    • Other Sister         bypass and vlave replacement   • Stroke Paternal Uncle    • Arrhythmia Neg Hx    • Asthma Neg Hx    • Clotting disorder Neg Hx    • Fainting Neg Hx    • Anuerysm Neg Hx    • Hypertension Neg Hx         unsure    • Hyperlipidemia Neg Hx    • Heart failure Neg Hx      Social History     Socioeconomic History   • Marital status:      Spouse name: None   • Number of children: None   • Years of education: None   • Highest education level: None   Occupational History   • None   Tobacco Use   • Smoking status: Every Day     Current packs/day: 1.00     Average packs/day: 0.6 packs/day for 100.0 years (62.5 ttl pk-yrs)     Types: Cigarettes     Passive exposure: Current   • Smokeless tobacco: Never   Vaping Use   • Vaping status: Never Used   Substance and Sexual Activity   • Alcohol use: Not Currently     Comment: rare   • Drug use: No   • Sexual activity: Not Currently   Other Topics Concern   • None   Social History Narrative    · Most recent tobacco use screenin2018      · Do you currently or have you served in the  [employer]-Work:   Yes      · If Yes, What branch of service:   [employer]      · Occupation:   Dentists      · Exercise level:   Occasional        · Caffeine intake:   Heavy      · Marital status:         · Diet:   Regular  low fat     · Seat belts used routinely:   Yes      · Smoke alarm in home:   Yes      · Advance directive:   Yes      · General stress level:   Low      Social Determinants of Health     Financial Resource Strain: Low Risk  (2023)    Overall Financial Resource Strain (CARDIA)    • Difficulty of Paying Living Expenses: Not very hard   Food Insecurity: No  Food Insecurity (4/17/2024)    Hunger Vital Sign    • Worried About Running Out of Food in the Last Year: Never true    • Ran Out of Food in the Last Year: Never true   Transportation Needs: No Transportation Needs (4/17/2024)    PRAPARE - Transportation    • Lack of Transportation (Medical): No    • Lack of Transportation (Non-Medical): No   Physical Activity: Not on file   Stress: Not on file   Social Connections: Not on file   Intimate Partner Violence: Not on file   Housing Stability: Low Risk  (4/17/2024)    Housing Stability Vital Sign    • Unable to Pay for Housing in the Last Year: No    • Number of Places Lived in the Last Year: 1    • Unstable Housing in the Last Year: No     Current Outpatient Medications on File Prior to Visit   Medication Sig   • allopurinol (ZYLOPRIM) 300 mg tablet Take 1 tablet (300 mg total) by mouth daily   • amLODIPine (NORVASC) 10 mg tablet TAKE 1 TABLET DAILY   • atorvastatin (LIPITOR) 80 mg tablet TAKE 1 TABLET DAILY AT     BEDTIME   • calcitriol (ROCALTROL) 0.25 mcg capsule Take 1 capsule (0.25 mcg total) by mouth daily   • glimepiride (AMARYL) 1 mg tablet Take 1 tablet (1 mg total) by mouth daily with breakfast   • hydrALAZINE (APRESOLINE) 25 mg tablet Take 1 tablet (25 mg total) by mouth 2 (two) times a day   • metoprolol succinate (TOPROL-XL) 25 mg 24 hr tablet Take 0.5 tablets (12.5 mg total) by mouth daily   • pancrelipase, Lip-Prot-Amyl, (CREON) 24,000 units Take 24,000 units of lipase by mouth 3 (three) times a day with meals   • pantoprazole (PROTONIX) 40 mg tablet Take 1 tablet (40 mg total) by mouth 2 (two) times a day   • clopidogrel (PLAVIX) 75 mg tablet Take 1 tablet (75 mg total) by mouth daily (Patient not taking: Reported on 4/17/2024)   • mupirocin (BACTROBAN) 2 % ointment Apply topically 3 (three) times a day (Patient not taking: Reported on 3/7/2024)   • torsemide (DEMADEX) 20 mg tablet TAKE 0.5 TABLETS (10 MG TOTAL) BY MOUTH DAILY TAKES 10 MG ONCE A DAY.  "(Patient taking differently: Take 20 mg by mouth daily)     No Known Allergies  Immunization History   Administered Date(s) Administered   • COVID-19 Moderna mRNA Vaccine 12 Yr+ 50 mcg/0.5 mL (Spikevax) 10/04/2023   • COVID-19 PFIZER VACCINE 0.3 ML IM 03/03/2021, 03/24/2021, 09/30/2021   • COVID-19 Pfizer Vac BIVALENT Stuart-sucrose 12 Yr+ IM 09/29/2022   • H1N1 Inj 11/15/2020   • H1N1, All Formulations 11/15/2020   • INFLUENZA 10/04/2011, 10/19/2012, 10/16/2014, 10/13/2016, 10/06/2018, 11/11/2021, 10/19/2022, 10/19/2023   • Influenza Split High Dose Preservative Free IM 10/06/2018, 11/01/2019   • Pneumococcal Conjugate 13-Valent 12/04/2015   • Pneumococcal Polysaccharide PPV23 06/11/2019   • Respiratory Syncytial Virus Vaccine (Recombinant, Adjuvanted) 10/04/2023   • Tdap 10/15/2021       Objective     /64 (BP Location: Left arm, Patient Position: Sitting, Cuff Size: Standard)   Pulse 67   Temp 97.7 °F (36.5 °C)   Resp 18   Ht 5' 10\" (1.778 m)   Wt 79.8 kg (176 lb)   SpO2 96%   BMI 25.25 kg/m²     Physical Exam  Siómn Hadley MD    "

## 2024-04-24 NOTE — PROGRESS NOTES
Received return call from patient, introduced self. Jay reports he is scheduled to meet with thoracic surgeon tomorrow.  Had a lung biopsy and as per patient has been diagnosed with squamous cell lung Ca.  Supportive listening offered to patient.   He requests I call him back to follow up in two weeks.

## 2024-04-24 NOTE — ASSESSMENT & PLAN NOTE
Lab Results   Component Value Date    EGFR 27 04/19/2024    EGFR 30 04/18/2024    EGFR 24 04/17/2024    CREATININE 2.18 (H) 04/19/2024    CREATININE 2.00 (H) 04/18/2024    CREATININE 2.38 (H) 04/17/2024   Pt to avoid NSAIDs and any IV dyes. Patient to follow up eoither with nephrology or  with us for  further  monitoring of  renal function.

## 2024-04-24 NOTE — PROGRESS NOTES
Assessment & Plan     1. Acute blood loss anemia  Assessment & Plan:  Patient is stable  and will continue present plan of care and reassess at next routine visit. All questions about this problem from patient were answered today.     Orders:  -     CBC and differential; Future  -     Basic metabolic panel; Future    2. Acute renal failure superimposed on stage 4 chronic kidney disease, unspecified acute renal failure type (HCC)  Assessment & Plan:  Lab Results   Component Value Date    EGFR 27 04/19/2024    EGFR 30 04/18/2024    EGFR 24 04/17/2024    CREATININE 2.18 (H) 04/19/2024    CREATININE 2.00 (H) 04/18/2024    CREATININE 2.38 (H) 04/17/2024   Pt to avoid NSAIDs and any IV dyes. Patient to follow up eoither with nephrology or  with us for  further  monitoring of  renal function.       3. Atherosclerosis of native arteries of extremities with intermittent claudication, bilateral legs (HCC)  Assessment & Plan:  Patient is stable  and will continue present plan of care and reassess at next routine visit. All questions about this problem from patient were answered today.       4. Coronary artery disease involving native coronary artery of native heart without angina pectoris    5. Mixed hyperlipidemia  Assessment & Plan:  Patient  is stable with current medication and we discussed a low fat low cholesterol diet. Weight loss also discussed for this will help lower cholesterol also. Recheck lipids in 6 months.       6. Iron deficiency anemia, unspecified iron deficiency anemia type    7. Type 2 diabetes mellitus with stage 4 chronic kidney disease, without long-term current use of insulin (HCC)  Assessment & Plan:  Patient is stable with current meds and discussed a low carb diet. Pt  recommended to see eye doctor and foot doctor for routine screening as per protocol. Recheck A1C  and Cr in 3 months. Patient questions answered today about this condtion.   Lab Results   Component Value Date    HGBA1C 6.6 (H)  03/05/2024              Subjective     Transitional Care Management Review:   Jay Roach Jr. is a 79 y.o. male here for TCM follow up.     During the TCM phone call patient stated:  TCM Call       Date and time call was made  4/22/2024 12:19 PM    Hospital care reviewed  Records reviewed    Patient was hospitialized at  [facility]    Date of Admission  04/17/24    Date of discharge  04/19/24    Diagnosis  Mass of left lung    Disposition  Home    Were the patients medications reviewed and updated  No    Current Symptoms  Weakness    Lower abdominal pain severity  Mild    Lower abdominal pain onset  Unable to assess    Weakness severity  Moderate    Dizziness severity  Mild    Quality Character  Loss of balance    Episode pattern  Rare    Cause  No known event          TCM Call       Post hospital issues  Reduced activity    Should patient be enrolled in anticoag monitoring?  No    Scheduled for follow up?  Yes    Patients specialists  Cardiologist    Cardiologist name  Dr. Olmedo or Sarah Mckeon    Nephrologist name  Marcel Muñoz MD    Did you obtain your prescribed medications  Yes    Do you need help managing your prescriptions or medications  No    Is transportation to your appointment needed  No    I have advised the patient to call PCP with any new or worsening symptoms  Sera Milton MA    Living Arrangements  Alone    Support System  Family    The type of support provided  Emotional; Physical    Do you have social support  Yes, as much as I need    Comment  Pt lives alone, Niece lives in apartment below, brother lives in house behind his.    Are you recieving any outpatient services  No    Are you recieving home care services  No    Types of home care services  Nurse visit    Are you using any community resources  No    Current waiver services  No    Have you fallen in the last 12 months  No    Interperter language line needed  No          HPI  Review of Systems    Objective     /64  "(BP Location: Left arm, Patient Position: Sitting, Cuff Size: Standard)   Pulse 67   Temp 97.7 °F (36.5 °C)   Resp 18   Ht 5' 10\" (1.778 m)   Wt 79.8 kg (176 lb)   SpO2 96%   BMI 25.25 kg/m²      Physical Exam  Medications have been reviewed by provider in current encounter    Simón Hadley MD         "

## 2024-04-25 ENCOUNTER — TELEPHONE (OUTPATIENT)
Dept: HEMATOLOGY ONCOLOGY | Facility: CLINIC | Age: 79
End: 2024-04-25

## 2024-04-25 ENCOUNTER — OFFICE VISIT (OUTPATIENT)
Dept: CARDIAC SURGERY | Facility: CLINIC | Age: 79
End: 2024-04-25
Payer: MEDICARE

## 2024-04-25 VITALS
HEART RATE: 53 BPM | OXYGEN SATURATION: 95 % | WEIGHT: 179.45 LBS | BODY MASS INDEX: 25.69 KG/M2 | TEMPERATURE: 98.1 F | DIASTOLIC BLOOD PRESSURE: 64 MMHG | HEIGHT: 70 IN | RESPIRATION RATE: 14 BRPM | SYSTOLIC BLOOD PRESSURE: 131 MMHG

## 2024-04-25 DIAGNOSIS — C34.32 PRIMARY NON-SMALL CELL CARCINOMA OF LOWER LOBE OF LEFT LUNG (HCC): Primary | ICD-10-CM

## 2024-04-25 PROCEDURE — 99213 OFFICE O/P EST LOW 20 MIN: CPT | Performed by: THORACIC SURGERY (CARDIOTHORACIC VASCULAR SURGERY)

## 2024-04-25 NOTE — ASSESSMENT & PLAN NOTE
Mr. Roach's IR lung biopsy results were reviewed showing non-small cell carcinoma.  PET/CT scan showed mild FDG uptake of left lower lobe nodule and site of IR biopsy. Given patient's co-morbidities and PFTs in 2022, Dr. Schofield would recommend SBRT. Referral placed for radiation oncology.  Discussed that patient's hemoptysis seems to be slowing and is not unexpected given his traumatic IR lung biopsy but this should continue to improve.  Patient remains off Plavix at this time and is going to get approval to restart from cardiologist which likely will be restarted soon.  Encouraged smoking cessation.  Patient declined referral to smoking cessation program at this time.  Patient in agreement with plan.

## 2024-04-25 NOTE — LETTER
April 25, 2024     Simón Hadley MD  [address]  [facility] [address]    Patient: Jay Roach Jr.   YOB: 1945   Date of Visit: 4/25/2024       Dear Dr. Hadley:    Thank you for referring Jay Roach to me for evaluation. Below are my notes for this consultation.    If you have questions, please do not hesitate to call me. I look forward to following your patient along with you.         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 Roach's IR lung biopsy results were reviewed showing non-small cell carcinoma.  PET/CT scan showed mild FDG uptake of left lower lobe nodule and site of IR biopsy. Given patient's co-morbidities and PFTs in 2022, Dr. Schofield would recommend SBRT. Referral placed for radiation oncology.  Discussed that patient's hemoptysis seems to be slowing and is not unexpected given his traumatic IR lung biopsy but this should continue to improve.  Patient remains off Plavix at this time and is going to get approval to restart from cardiologist which likely will be restarted soon. Patient in agreement with plan.             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)  Staging form: Lung, AJCC 8th Edition  - Clinical stage from 4/17/2024: Stage IA2 (cT1b, cN0, cM0) - Signed by Uzma Delgado PA-C on 4/25/2024  Histopathologic type: Squamous cell carcinoma,  NOS      Oncology History   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 is a 79 y.o. male.    HPI    ECOG 0    Jay Roach Jr. is a 79 y.o. male with a PMH of CAD s/p CABG x2 and TAVR, hypertension, CKD, hyperlipidemia, type 2 diabetes mellitus, and approximately 60 pack year smoking history who returns in follow up of a slowly enlarging left lower lobe pulmonary nodule on Ct scan. At last office visit on 3/21/2024, patient recommended to have a PET CT scan and IR biopsy for this slowly enlarging nodule.    PET-CT on 4/11/24 was personally reviewed by myself and in PACS which demonstrates left lower lobe lung nodule showing mild focal FDG activity with max SUV of 1.6 is likely misregistered (due to respiratory motion artifact). This nodule measures 1.2 cm. Scan report noting no evidence for hypermetabolic metastatic disease.      PFTs completed on 4/14/2022 with FEV1 of 2.08L, 71% predicted, FVC 3.25L, 83% predicted, uncorrected DLCO 48% predicted.     On discussion today, patient had to be admitted after his IR lung biopsy on 4/17/24. Per inpatient notes, patient moved during the biopsy and there was resultant hemoptysis and hypoxia for which he was admitted to the hospital. He was discharged from the hospital on 4/19/24. Still a little bit of hemoptysis with cough. Getting coughing spells, often after eating or when he takes a very deep breath. Still off Plavix, pending cardiologist determination on when to restart. No blood transfusion while admitted.  Notes symptoms of cramping in his legs since  "his hemoglobin dropped during admission.  Denies shortness of breath, fevers. Slight chills. Currently smoking about 1/2 pack a day. Reports he will smoke for the rest of his life.     The following portions of the patient's history were reviewed and updated as appropriate: allergies, current medications, past family history, past medical history, past social history, past surgical history and problem list.    Review of Systems   Constitutional:  Positive for chills. Negative for fever.   Respiratory:  Positive for cough (hemoptysis slowly resolving after IR lung biopsy on 4/17 with entry to a vessel). Negative for shortness of breath.    Musculoskeletal:  Positive for myalgias (bilateral lower extremities).         Objective:   Physical Exam  Constitutional:       General: He is not in acute distress.  HENT:      Head: Normocephalic and atraumatic.      Nose: Nose normal.   Eyes:      Extraocular Movements: Extraocular movements intact.      Comments: Wears glasses     Cardiovascular:      Rate and Rhythm: Normal rate and regular rhythm.   Pulmonary:      Effort: Pulmonary effort is normal.      Breath sounds: Normal breath sounds.   Abdominal:      Palpations: Abdomen is soft.      Tenderness: There is no abdominal tenderness.   Musculoskeletal:      Right lower leg: No edema.      Left lower leg: No edema.   Skin:     General: Skin is warm and dry.     /64 (BP Location: Left arm, Patient Position: Sitting, Cuff Size: Standard)   Pulse (!) 53   Temp 98.1 °F (36.7 °C) (Temporal)   Resp 14   Ht 5' 10\" (1.778 m)   Wt 81.4 kg (179 lb 7.3 oz)   SpO2 95%   BMI 25.75 kg/m²     No Chest XR results available for this patient.   CT chest wo contrast    Result Date: 3/5/2024  Narrative CT CHEST WITHOUT IV CONTRAST INDICATION: R91.8: Other nonspecific abnormal finding of lung field. COMPARISON: CT abdomen and pelvis December 22, 2023, CT chest October 4, 2022, April 2021 TECHNIQUE: CT examination of the chest was " performed without intravenous contrast. Multiplanar 2D reformatted images were created from the source data. This examination, like all CT scans performed in the [facility], was performed utilizing techniques to minimize radiation dose exposure, including the use of iterative reconstruction and automated exposure control. Radiation dose length product (DLP) for this visit: 276 mGy-cm FINDINGS: LUNGS: Severe pulmonary emphysema with scattered granulomata. Irregular nodular opacity is demonstrated in the left lower lobe series 3 image 156, enlarged compared to the previous exam with a mean diameter of 9 mm. Enlargement of the nodule raises concern for malignancy. There was a small nodular density at this level on the previous exam. Mild reticular interstitial thickening at the lung bases right more than left is stable. Linear density right upper lobe 3/38 suggesting an area of scarring. There is no tracheal or endobronchial lesion. PLEURA: Unremarkable. HEART/GREAT VESSELS: Heart is unremarkable for patient's age. No interval change in high density material at the cardiac apex 2/85. No thoracic aortic aneurysm. TAVR. Median sternotomy. Extensive coronary calcifications. MEDIASTINUM AND MICHAEL: Unremarkable. CHEST WALL AND LOWER NECK: Unremarkable. VISUALIZED STRUCTURES IN THE UPPER ABDOMEN: Atherosclerotic disease of the abdominal aorta and its branches. Calcifications and atrophy of the visualized portions of the pancreas. OSSEOUS STRUCTURES: No acute fracture or destructive osseous lesion. Flowing osteophytes are demonstrated at the level of the thoracic spine. There is stable sclerosis involving the T2 vertebral body with posterior spinous process fusion.     Impression 1. Enlargement of the left lower lobe lung nodule which now has a diameter of 9 mm. Indeterminate solid pulmonary nodule measuring 9 mm. In a patient of unknown risk level with a solid nodule >8 mm, consider PET/CT or tissue sampling,  vs. CT at 3 months. Juan Luis CARDOZA et al. Guidelines for Management of Incidental Pulmonary Nodules Detected on CT Images: From the Fleischner Society 2017. Radiology. 2017 Jul;284(1):228-243. The study was marked in EPIC for significant notification and additional evaluation. Workstation performed: [id number]     No CT Chest,Abdomen,Pelvis results available for this patient.   NM PET CT skull base to mid thigh    Result Date: 4/12/2024  Narrative PET/CT SCAN INDICATION: D38.1: Neoplasm of uncertain behavior of trachea, bronchus and lung R91.8: Other nonspecific abnormal finding of lung field MODIFIER: PI COMPARISON: CT of chest dated 2/28/2024 and CT of abdomen and pelvis dated 12/22/2023 CELL TYPE: Not applicable TECHNIQUE:   8.4 mCi F-18-FDG administered IV. Multiplanar attenuation corrected and non attenuation corrected PET images are available for interpretation, and contiguous, low dose, axial CT sections were obtained from the skull vertex through the femurs. Intravenous contrast material was not utilized. This examination, like all CT scans performed in the [facility], was performed utilizing techniques to minimize radiation dose exposure, including the use of iterative reconstruction and automated exposure control. Fasting serum glucose: 104 mg/dl FINDINGS: VISUALIZED BRAIN: No acute abnormalities are seen. HEAD/NECK: There is a physiologic distribution of FDG. No FDG avid cervical adenopathy is seen. CT images: Intracranial atherosclerotic calcification is noted. CHEST: Mild focal FDG activity on PET image 141 with max SUV of 1.6 is likely misregistered (due to respiratory motion artifact) to the patient's known irregular left lower lobe lung nodule located slightly inferiorly on image 144 of series 3 which measures 1.2  cm. Developing malignancy is the diagnosis of exclusion and further evaluation with tissue sampling is recommended as clinically indicated. On image 154 of series 3 there is  calcification about the peripheral aspect of the left ventricular apex which could reflect myocardial versus pericardial calcification without significant interval change. CT images: Atherosclerotic vascular calcifications including those of the coronary arteries are noted. Mild emphysematous changes. Nonspecific interstitial thickening involving the dependent aspects of the bilateral lower lobes. ABDOMEN: Nonspecific patchy FDG activity involving the gastric wall, possibly physiologic/inflammatory in etiology. CT images: Streak artifact related to patient's arms being down limits evaluation on low-dose unenhanced CT. Cholelithiasis. Atherosclerotic vascular calcifications are noted. Nonobstructing left renal calculus. Pancreatic calcifications suggestive of sequela chronic pancreatitis. Postsurgical changes about the cecum. Diverticulosis coli. PELVIS: Mild nonspecific heterogeneous prostate activity, slightly asymmetric towards the right but not definitively focal in nature, possibly physiologic/inflammatory in etiology. CT images: Postsurgical changes in the bilateral inguinal regions. Heterogeneous prostate gland. OSSEOUS STRUCTURES: No FDG avid lesions are seen. CT images: Degenerative changes are noted involving the spine. Median sternotomy wires are present.     Impression 1. Patient's known increasing in size left lower lobe lung nodule currently measures 1.2 cm in maximal dimension and appears to be associated with mild misregistered focal FDG activity. Findings are highly suspicious for developing hypermetabolic malignancy and further evaluation with tissue sampling is recommended. 2. No evidence for hypermetabolic metastatic disease. Please see above for details and additional findings. The study was marked in EPIC for significant notification. Workstation performed: [id number]      No Barium Swallow results available for this patient.

## 2024-04-25 NOTE — TELEPHONE ENCOUNTER
Appointment Confirmation   Who are you speaking with? Patient   If it is not the patient, are they listed on an active communication consent form? N/A   Which provider is the appointment scheduled with?  Dr. Schofield   When is the appointment scheduled?  Please list date and time 4/25/24 220   At which location is the appointment scheduled to take place? [place]   Did caller verbalize understanding of appointment details? Yes

## 2024-04-25 NOTE — PROGRESS NOTES
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 Roach's IR lung biopsy results were reviewed showing non-small cell carcinoma.  PET/CT scan showed mild FDG uptake of left lower lobe nodule and site of IR biopsy. Given patient's co-morbidities and PFTs in 2022, Dr. Schofield would recommend SBRT. Referral placed for radiation oncology.  Discussed that patient's hemoptysis seems to be slowing and is not unexpected given his traumatic IR lung biopsy but this should continue to improve.  Patient remains off Plavix at this time and is going to get approval to restart from cardiologist which likely will be restarted soon.  Encouraged smoking cessation.  Patient declined referral to smoking cessation program at this time.  Patient in agreement with plan.             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 (HCC)  Staging form: Lung, AJCC 8th Edition  - Clinical stage from 4/17/2024: Stage IA2 (cT1b, cN0, cM0) - Signed by Uzma Delgado PA-C on 4/25/2024  Histopathologic type: Squamous cell carcinoma, NOS      Oncology History   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 is a 79 y.o. male.    HPI    ECOG 0    Jay Roach Jr. is a 79 y.o. male with a PMH of CAD s/p CABG x2 and TAVR, hypertension, CKD, hyperlipidemia, type 2 diabetes mellitus, and approximately 60 pack year smoking history who returns in follow up of a slowly enlarging left lower lobe pulmonary nodule on Ct scan. At last office visit on 3/21/2024, patient recommended to have a PET CT scan and IR biopsy for this slowly enlarging nodule.    PET-CT on 4/11/24 was personally reviewed by myself and in PACS which demonstrates left lower lobe lung nodule showing mild focal FDG activity with max SUV of 1.6 is likely misregistered (due to respiratory motion artifact). This nodule measures 1.2 cm. Scan report noting no evidence for hypermetabolic metastatic disease.      PFTs completed on 4/14/2022 with FEV1 of 2.08L, 71% predicted, FVC 3.25L, 83% predicted, uncorrected DLCO 48% predicted.     On discussion today, patient had to be admitted after his IR lung biopsy on 4/17/24. Per inpatient notes, patient moved during the biopsy and there was resultant hemoptysis and hypoxia for which he was admitted to the hospital. He was discharged from the hospital on 4/19/24. Still a little bit of hemoptysis with cough. Getting coughing spells, often after eating or when he takes a very deep breath. Still off Plavix, pending cardiologist determination on when to restart. No blood transfusion while admitted.  Notes symptoms of cramping in his legs since his hemoglobin dropped during admission.  Denies shortness of breath, fevers. Slight chills. Currently smoking about 1/2 pack a day. Reports he will smoke for the rest of his life.     The following portions of the patient's history were reviewed and updated as appropriate: allergies, current medications, past family history, past medical history, past social history, past surgical history and problem list.    Review of Systems   Constitutional:  Positive for  "chills. Negative for fever.   Respiratory:  Positive for cough (hemoptysis slowly resolving after IR lung biopsy on 4/17 with entry to a vessel). Negative for shortness of breath.    Musculoskeletal:  Positive for myalgias (bilateral lower extremities).         Objective:   Physical Exam  Constitutional:       General: He is not in acute distress.  HENT:      Head: Normocephalic and atraumatic.      Nose: Nose normal.   Eyes:      Extraocular Movements: Extraocular movements intact.      Comments: Wears glasses     Cardiovascular:      Rate and Rhythm: Normal rate and regular rhythm.   Pulmonary:      Effort: Pulmonary effort is normal.      Breath sounds: Normal breath sounds.   Abdominal:      Palpations: Abdomen is soft.      Tenderness: There is no abdominal tenderness.   Musculoskeletal:      Right lower leg: No edema.      Left lower leg: No edema.   Skin:     General: Skin is warm and dry.     /64 (BP Location: Left arm, Patient Position: Sitting, Cuff Size: Standard)   Pulse (!) 53   Temp 98.1 °F (36.7 °C) (Temporal)   Resp 14   Ht 5' 10\" (1.778 m)   Wt 81.4 kg (179 lb 7.3 oz)   SpO2 95%   BMI 25.75 kg/m²     No Chest XR results available for this patient.   CT chest wo contrast    Result Date: 3/5/2024  Narrative CT CHEST WITHOUT IV CONTRAST INDICATION: R91.8: Other nonspecific abnormal finding of lung field. COMPARISON: CT abdomen and pelvis December 22, 2023, CT chest October 4, 2022, April 2021 TECHNIQUE: CT examination of the chest was performed without intravenous contrast. Multiplanar 2D reformatted images were created from the source data. This examination, like all CT scans performed in the [facility], was performed utilizing techniques to minimize radiation dose exposure, including the use of iterative reconstruction and automated exposure control. Radiation dose length product (DLP) for this visit: 276 mGy-cm FINDINGS: LUNGS: Severe pulmonary emphysema with scattered " granulomata. Irregular nodular opacity is demonstrated in the left lower lobe series 3 image 156, enlarged compared to the previous exam with a mean diameter of 9 mm. Enlargement of the nodule raises concern for malignancy. There was a small nodular density at this level on the previous exam. Mild reticular interstitial thickening at the lung bases right more than left is stable. Linear density right upper lobe 3/38 suggesting an area of scarring. There is no tracheal or endobronchial lesion. PLEURA: Unremarkable. HEART/GREAT VESSELS: Heart is unremarkable for patient's age. No interval change in high density material at the cardiac apex 2/85. No thoracic aortic aneurysm. TAVR. Median sternotomy. Extensive coronary calcifications. MEDIASTINUM AND MICHAEL: Unremarkable. CHEST WALL AND LOWER NECK: Unremarkable. VISUALIZED STRUCTURES IN THE UPPER ABDOMEN: Atherosclerotic disease of the abdominal aorta and its branches. Calcifications and atrophy of the visualized portions of the pancreas. OSSEOUS STRUCTURES: No acute fracture or destructive osseous lesion. Flowing osteophytes are demonstrated at the level of the thoracic spine. There is stable sclerosis involving the T2 vertebral body with posterior spinous process fusion.     Impression 1. Enlargement of the left lower lobe lung nodule which now has a diameter of 9 mm. Indeterminate solid pulmonary nodule measuring 9 mm. In a patient of unknown risk level with a solid nodule >8 mm, consider PET/CT or tissue sampling, vs. CT at 3 months. Juan Luis H, et al. Guidelines for Management of Incidental Pulmonary Nodules Detected on CT Images: From the Fleischner Society 2017. Radiology. 2017 Jul;284(1):228-243. The study was marked in EPIC for significant notification and additional evaluation. Workstation performed: [id number]     No CT Chest,Abdomen,Pelvis results available for this patient.   NM PET CT skull base to mid thigh    Result Date: 4/12/2024  Narrative PET/CT SCAN  INDICATION: D38.1: Neoplasm of uncertain behavior of trachea, bronchus and lung R91.8: Other nonspecific abnormal finding of lung field MODIFIER: PI COMPARISON: CT of chest dated 2/28/2024 and CT of abdomen and pelvis dated 12/22/2023 CELL TYPE: Not applicable TECHNIQUE:   8.4 mCi F-18-FDG administered IV. Multiplanar attenuation corrected and non attenuation corrected PET images are available for interpretation, and contiguous, low dose, axial CT sections were obtained from the skull vertex through the femurs. Intravenous contrast material was not utilized. This examination, like all CT scans performed in the [facility], was performed utilizing techniques to minimize radiation dose exposure, including the use of iterative reconstruction and automated exposure control. Fasting serum glucose: 104 mg/dl FINDINGS: VISUALIZED BRAIN: No acute abnormalities are seen. HEAD/NECK: There is a physiologic distribution of FDG. No FDG avid cervical adenopathy is seen. CT images: Intracranial atherosclerotic calcification is noted. CHEST: Mild focal FDG activity on PET image 141 with max SUV of 1.6 is likely misregistered (due to respiratory motion artifact) to the patient's known irregular left lower lobe lung nodule located slightly inferiorly on image 144 of series 3 which measures 1.2  cm. Developing malignancy is the diagnosis of exclusion and further evaluation with tissue sampling is recommended as clinically indicated. On image 154 of series 3 there is calcification about the peripheral aspect of the left ventricular apex which could reflect myocardial versus pericardial calcification without significant interval change. CT images: Atherosclerotic vascular calcifications including those of the coronary arteries are noted. Mild emphysematous changes. Nonspecific interstitial thickening involving the dependent aspects of the bilateral lower lobes. ABDOMEN: Nonspecific patchy FDG activity involving the  gastric wall, possibly physiologic/inflammatory in etiology. CT images: Streak artifact related to patient's arms being down limits evaluation on low-dose unenhanced CT. Cholelithiasis. Atherosclerotic vascular calcifications are noted. Nonobstructing left renal calculus. Pancreatic calcifications suggestive of sequela chronic pancreatitis. Postsurgical changes about the cecum. Diverticulosis coli. PELVIS: Mild nonspecific heterogeneous prostate activity, slightly asymmetric towards the right but not definitively focal in nature, possibly physiologic/inflammatory in etiology. CT images: Postsurgical changes in the bilateral inguinal regions. Heterogeneous prostate gland. OSSEOUS STRUCTURES: No FDG avid lesions are seen. CT images: Degenerative changes are noted involving the spine. Median sternotomy wires are present.     Impression 1. Patient's known increasing in size left lower lobe lung nodule currently measures 1.2 cm in maximal dimension and appears to be associated with mild misregistered focal FDG activity. Findings are highly suspicious for developing hypermetabolic malignancy and further evaluation with tissue sampling is recommended. 2. No evidence for hypermetabolic metastatic disease. Please see above for details and additional findings. The study was marked in EPIC for significant notification. Workstation performed: [id number]      No Barium Swallow results available for this patient.

## 2024-04-25 NOTE — LETTER
April 25, 2024     Lilia Herrera MD  [address]  [address]  [facility] [address]    Patient: Jay Roach Jr.   YOB: 1945   Date of Visit: 4/25/2024       Dear Dr. Herrera:    Thank you for referring Jay Roach to me for evaluation. Below are my notes for this consultation.    If you have questions, please do not hesitate to call me. I look forward to following your patient along with you.         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 Roach's IR lung biopsy results were reviewed showing non-small cell carcinoma.  PET/CT scan showed mild FDG uptake of left lower lobe nodule and site of IR biopsy. Given patient's co-morbidities and PFTs in 2022, Dr. Schofield would recommend SBRT. Referral placed for radiation oncology.  Discussed that patient's hemoptysis seems to be slowing and is not unexpected given his traumatic IR lung biopsy but this should continue to improve.  Patient remains off Plavix at this time and is going to get approval to restart from cardiologist which likely will be restarted soon. Patient in agreement with plan.             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)  Staging form: Lung, AJCC 8th Edition  - Clinical stage from 4/17/2024: Stage IA2 (cT1b, cN0, cM0) - Signed by Uzma Delgado PA-C on 4/25/2024  Histopathologic type: Squamous cell carcinoma,  NOS      Oncology History   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 is a 79 y.o. male.    HPI    ECOG 0    Jay Roach Jr. is a 79 y.o. male with a PMH of CAD s/p CABG x2 and TAVR, hypertension, CKD, hyperlipidemia, type 2 diabetes mellitus, and approximately 60 pack year smoking history who returns in follow up of a slowly enlarging left lower lobe pulmonary nodule on Ct scan. At last office visit on 3/21/2024, patient recommended to have a PET CT scan and IR biopsy for this slowly enlarging nodule.    PET-CT on 4/11/24 was personally reviewed by myself and in PACS which demonstrates left lower lobe lung nodule showing mild focal FDG activity with max SUV of 1.6 is likely misregistered (due to respiratory motion artifact). This nodule measures 1.2 cm. Scan report noting no evidence for hypermetabolic metastatic disease.      PFTs completed on 4/14/2022 with FEV1 of 2.08L, 71% predicted, FVC 3.25L, 83% predicted, uncorrected DLCO 48% predicted.     On discussion today, patient had to be admitted after his IR lung biopsy on 4/17/24. Per inpatient notes, patient moved during the biopsy and there was resultant hemoptysis and hypoxia for which he was admitted to the hospital. He was discharged from the hospital on 4/19/24. Still a little bit of hemoptysis with cough. Getting coughing spells, often after eating or when he takes a very deep breath. Still off Plavix, pending cardiologist determination on when to restart. No blood transfusion while admitted.  Notes symptoms of cramping in his legs since  "his hemoglobin dropped during admission.  Denies shortness of breath, fevers. Slight chills. Currently smoking about 1/2 pack a day. Reports he will smoke for the rest of his life.     The following portions of the patient's history were reviewed and updated as appropriate: allergies, current medications, past family history, past medical history, past social history, past surgical history and problem list.    Review of Systems   Constitutional:  Positive for chills. Negative for fever.   Respiratory:  Positive for cough (hemoptysis slowly resolving after IR lung biopsy on 4/17 with entry to a vessel). Negative for shortness of breath.    Musculoskeletal:  Positive for myalgias (bilateral lower extremities).         Objective:   Physical Exam  Constitutional:       General: He is not in acute distress.  HENT:      Head: Normocephalic and atraumatic.      Nose: Nose normal.   Eyes:      Extraocular Movements: Extraocular movements intact.      Comments: Wears glasses     Cardiovascular:      Rate and Rhythm: Normal rate and regular rhythm.   Pulmonary:      Effort: Pulmonary effort is normal.      Breath sounds: Normal breath sounds.   Abdominal:      Palpations: Abdomen is soft.      Tenderness: There is no abdominal tenderness.   Musculoskeletal:      Right lower leg: No edema.      Left lower leg: No edema.   Skin:     General: Skin is warm and dry.     /64 (BP Location: Left arm, Patient Position: Sitting, Cuff Size: Standard)   Pulse (!) 53   Temp 98.1 °F (36.7 °C) (Temporal)   Resp 14   Ht 5' 10\" (1.778 m)   Wt 81.4 kg (179 lb 7.3 oz)   SpO2 95%   BMI 25.75 kg/m²     No Chest XR results available for this patient.   CT chest wo contrast    Result Date: 3/5/2024  Narrative CT CHEST WITHOUT IV CONTRAST INDICATION: R91.8: Other nonspecific abnormal finding of lung field. COMPARISON: CT abdomen and pelvis December 22, 2023, CT chest October 4, 2022, April 2021 TECHNIQUE: CT examination of the chest was " performed without intravenous contrast. Multiplanar 2D reformatted images were created from the source data. This examination, like all CT scans performed in the [facility], was performed utilizing techniques to minimize radiation dose exposure, including the use of iterative reconstruction and automated exposure control. Radiation dose length product (DLP) for this visit: 276 mGy-cm FINDINGS: LUNGS: Severe pulmonary emphysema with scattered granulomata. Irregular nodular opacity is demonstrated in the left lower lobe series 3 image 156, enlarged compared to the previous exam with a mean diameter of 9 mm. Enlargement of the nodule raises concern for malignancy. There was a small nodular density at this level on the previous exam. Mild reticular interstitial thickening at the lung bases right more than left is stable. Linear density right upper lobe 3/38 suggesting an area of scarring. There is no tracheal or endobronchial lesion. PLEURA: Unremarkable. HEART/GREAT VESSELS: Heart is unremarkable for patient's age. No interval change in high density material at the cardiac apex 2/85. No thoracic aortic aneurysm. TAVR. Median sternotomy. Extensive coronary calcifications. MEDIASTINUM AND MICHAEL: Unremarkable. CHEST WALL AND LOWER NECK: Unremarkable. VISUALIZED STRUCTURES IN THE UPPER ABDOMEN: Atherosclerotic disease of the abdominal aorta and its branches. Calcifications and atrophy of the visualized portions of the pancreas. OSSEOUS STRUCTURES: No acute fracture or destructive osseous lesion. Flowing osteophytes are demonstrated at the level of the thoracic spine. There is stable sclerosis involving the T2 vertebral body with posterior spinous process fusion.     Impression 1. Enlargement of the left lower lobe lung nodule which now has a diameter of 9 mm. Indeterminate solid pulmonary nodule measuring 9 mm. In a patient of unknown risk level with a solid nodule >8 mm, consider PET/CT or tissue sampling,  vs. CT at 3 months. Juan Luis CARDOZA et al. Guidelines for Management of Incidental Pulmonary Nodules Detected on CT Images: From the Fleischner Society 2017. Radiology. 2017 Jul;284(1):228-243. The study was marked in EPIC for significant notification and additional evaluation. Workstation performed: [id number]     No CT Chest,Abdomen,Pelvis results available for this patient.   NM PET CT skull base to mid thigh    Result Date: 4/12/2024  Narrative PET/CT SCAN INDICATION: D38.1: Neoplasm of uncertain behavior of trachea, bronchus and lung R91.8: Other nonspecific abnormal finding of lung field MODIFIER: PI COMPARISON: CT of chest dated 2/28/2024 and CT of abdomen and pelvis dated 12/22/2023 CELL TYPE: Not applicable TECHNIQUE:   8.4 mCi F-18-FDG administered IV. Multiplanar attenuation corrected and non attenuation corrected PET images are available for interpretation, and contiguous, low dose, axial CT sections were obtained from the skull vertex through the femurs. Intravenous contrast material was not utilized. This examination, like all CT scans performed [facility] the [facility], was performed utilizing techniques to minimize radiation dose exposure, including the use of iterative reconstruction and automated exposure control. Fasting serum glucose: 104 mg/dl FINDINGS: VISUALIZED BRAIN: No acute abnormalities are seen. HEAD/NECK: There is a physiologic distribution of FDG. No FDG avid cervical adenopathy is seen. CT images: Intracranial atherosclerotic calcification is noted. CHEST: Mild focal FDG activity on PET image 141 with max SUV of 1.6 is likely misregistered (due to respiratory motion artifact) to the patient's known irregular left lower lobe lung nodule located slightly inferiorly on image 144 of series 3 which measures 1.2  cm. Developing malignancy is the diagnosis of exclusion and further evaluation with tissue sampling is recommended as clinically indicated. On image 154 of series 3 there is  calcification about the peripheral aspect of the left ventricular apex which could reflect myocardial versus pericardial calcification without significant interval change. CT images: Atherosclerotic vascular calcifications including those of the coronary arteries are noted. Mild emphysematous changes. Nonspecific interstitial thickening involving the dependent aspects of the bilateral lower lobes. ABDOMEN: Nonspecific patchy FDG activity involving the gastric wall, possibly physiologic/inflammatory in etiology. CT images: Streak artifact related to patient's arms being down limits evaluation on low-dose unenhanced CT. Cholelithiasis. Atherosclerotic vascular calcifications are noted. Nonobstructing left renal calculus. Pancreatic calcifications suggestive of sequela chronic pancreatitis. Postsurgical changes about the cecum. Diverticulosis coli. PELVIS: Mild nonspecific heterogeneous prostate activity, slightly asymmetric towards the right but not definitively focal in nature, possibly physiologic/inflammatory in etiology. CT images: Postsurgical changes in the bilateral inguinal regions. Heterogeneous prostate gland. OSSEOUS STRUCTURES: No FDG avid lesions are seen. CT images: Degenerative changes are noted involving the spine. Median sternotomy wires are present.     Impression 1. Patient's known increasing in size left lower lobe lung nodule currently measures 1.2 cm in maximal dimension and appears to be associated with mild misregistered focal FDG activity. Findings are highly suspicious for developing hypermetabolic malignancy and further evaluation with tissue sampling is recommended. 2. No evidence for hypermetabolic metastatic disease. Please see above for details and additional findings. The study was marked in EPIC for significant notification. Workstation performed: [id number]      No Barium Swallow results available for this patient.

## 2024-04-29 ENCOUNTER — TELEPHONE (OUTPATIENT)
Age: 79
End: 2024-04-29

## 2024-04-29 NOTE — TELEPHONE ENCOUNTER
Patient call:  Pt stated provider: Not established with pulmonology.    Actionable item: Offered appointment    What is the reason for the call/chief complaint?    Reports lung biopsy 4/17, describing severe bleeding event that occurred during procedure. Hgb dropped from 10s to 7.9. States that he has been off Plavix but is still expectorating dark reddish/brown mucous with coughing spells. Reports lessening quantity but persistent outside 7-10 days window he was initially informed it would occur for. Does not expectorate the colored mucous every time.     Would like to know if he can restart Plavix and if this is ok to occur. Educated patient that appears to be clearing based on description and to call with worsening symptoms, SOB, or increased amount/brighter red mucous. Advised patient to contact cardiologist for recommendations as he is not established with pulm. Offered appointment but did not accept.     Denies difficulty breathing. SPO2 96%.     Dispo: Advised patient to cardiologist. Offered pulm appointment. Declined.   [facility].  Agrees with plan.   All questions answered.

## 2024-04-30 ENCOUNTER — APPOINTMENT (OUTPATIENT)
Dept: LAB | Facility: MEDICAL CENTER | Age: 79
End: 2024-04-30
Payer: MEDICARE

## 2024-04-30 DIAGNOSIS — D62 ACUTE BLOOD LOSS ANEMIA: ICD-10-CM

## 2024-04-30 LAB
ANION GAP SERPL CALCULATED.3IONS-SCNC: 11 MMOL/L (ref 4–13)
BASOPHILS # BLD AUTO: 0.04 THOUSANDS/ÂΜL (ref 0–0.1)
BASOPHILS NFR BLD AUTO: 1 % (ref 0–1)
BUN SERPL-MCNC: 31 MG/DL (ref 5–25)
CALCIUM SERPL-MCNC: 8.9 MG/DL (ref 8.4–10.2)
CHLORIDE SERPL-SCNC: 105 MMOL/L (ref 96–108)
CO2 SERPL-SCNC: 26 MMOL/L (ref 21–32)
CREAT SERPL-MCNC: 2.18 MG/DL (ref 0.6–1.3)
EOSINOPHIL # BLD AUTO: 0.4 THOUSAND/ÂΜL (ref 0–0.61)
EOSINOPHIL NFR BLD AUTO: 7 % (ref 0–6)
ERYTHROCYTE [DISTWIDTH] IN BLOOD BY AUTOMATED COUNT: 16.4 % (ref 11.6–15.1)
GFR SERPL CREATININE-BSD FRML MDRD: 27 ML/MIN/1.73SQ M
GLUCOSE P FAST SERPL-MCNC: 101 MG/DL (ref 65–99)
HCT VFR BLD AUTO: 29.3 % (ref 36.5–49.3)
HGB BLD-MCNC: 9.2 G/DL (ref 12–17)
IMM GRANULOCYTES # BLD AUTO: 0.01 THOUSAND/UL (ref 0–0.2)
IMM GRANULOCYTES NFR BLD AUTO: 0 % (ref 0–2)
LYMPHOCYTES # BLD AUTO: 1.03 THOUSANDS/ÂΜL (ref 0.6–4.47)
LYMPHOCYTES NFR BLD AUTO: 19 % (ref 14–44)
MCH RBC QN AUTO: 30.1 PG (ref 26.8–34.3)
MCHC RBC AUTO-ENTMCNC: 31.4 G/DL (ref 31.4–37.4)
MCV RBC AUTO: 96 FL (ref 82–98)
MONOCYTES # BLD AUTO: 0.45 THOUSAND/ÂΜL (ref 0.17–1.22)
MONOCYTES NFR BLD AUTO: 8 % (ref 4–12)
NEUTROPHILS # BLD AUTO: 3.57 THOUSANDS/ÂΜL (ref 1.85–7.62)
NEUTS SEG NFR BLD AUTO: 65 % (ref 43–75)
NRBC BLD AUTO-RTO: 0 /100 WBCS
PLATELET # BLD AUTO: 193 THOUSANDS/UL (ref 149–390)
PMV BLD AUTO: 10.9 FL (ref 8.9–12.7)
POTASSIUM SERPL-SCNC: 3.7 MMOL/L (ref 3.5–5.3)
RBC # BLD AUTO: 3.06 MILLION/UL (ref 3.88–5.62)
SODIUM SERPL-SCNC: 142 MMOL/L (ref 135–147)
WBC # BLD AUTO: 5.5 THOUSAND/UL (ref 4.31–10.16)

## 2024-04-30 PROCEDURE — 36415 COLL VENOUS BLD VENIPUNCTURE: CPT

## 2024-04-30 PROCEDURE — 85025 COMPLETE CBC W/AUTO DIFF WBC: CPT

## 2024-04-30 PROCEDURE — 80048 BASIC METABOLIC PNL TOTAL CA: CPT

## 2024-05-03 ENCOUNTER — RADIATION ONCOLOGY CONSULT (OUTPATIENT)
Dept: RADIATION ONCOLOGY | Facility: CLINIC | Age: 79
End: 2024-05-03
Attending: RADIOLOGY
Payer: MEDICARE

## 2024-05-03 VITALS
TEMPERATURE: 97.9 F | OXYGEN SATURATION: 98 % | HEART RATE: 60 BPM | SYSTOLIC BLOOD PRESSURE: 138 MMHG | RESPIRATION RATE: 18 BRPM | DIASTOLIC BLOOD PRESSURE: 60 MMHG

## 2024-05-03 DIAGNOSIS — C34.32 PRIMARY NON-SMALL CELL CARCINOMA OF LOWER LOBE OF LEFT LUNG (HCC): ICD-10-CM

## 2024-05-03 PROCEDURE — 99205 OFFICE O/P NEW HI 60 MIN: CPT | Performed by: RADIOLOGY

## 2024-05-03 PROCEDURE — 99211 OFF/OP EST MAY X REQ PHY/QHP: CPT | Performed by: RADIOLOGY

## 2024-05-03 NOTE — PROGRESS NOTES
Jay Roach Jr. 1945 is a 79 y.o. male current smoker who was recently diagnosed with non-small cell carcinoma of the left lung.  Recent CT lung screening showed an increase in size of his pulmonary nodule. He underwent IR lung biopsy and is being referred by Dr. Schofield for consideration of SBRT since he is not a surgical candidate. He presents today for consult.       2/21/24 PCP, Dr. Hadley  encouraged to quit using tobacco   Pulmonary nodules  -     CT chest wo contrast      2/28/24 CT chest wo contrast  1. Enlargement of the left lower lobe lung nodule which now has a diameter of 9 mm.   Indeterminate solid pulmonary nodule measuring 9 mm. In a patient of unknown risk level with a solid nodule >8 mm, consider PET/CT or tissue sampling, vs. CT at 3 months.      3/7/24 PCP, Dr. Hadley   Mass of left lung  -     Ambulatory Referral to Thoracic Surgery      3/21/24 Dr. Schofield  CT scan demonstrates an approximately 9 mm pulmonary nodule.   concerning for lung cancer.   At this time, I am recommending a PET CT scan as well as PFTs and an IR biopsy.       4/11/24 PET CT  1. Patient's known increasing in size left lower lobe lung nodule currently measures 1.2 cm in maximal dimension and appears to be associated with mild misregistered focal FDG activity. Findings are highly suspicious for developing hypermetabolic   malignancy and further evaluation with tissue sampling is recommended.   2. No evidence for hypermetabolic metastatic disease.      4/17/24 IR lung biopsy      4/17/24 Hospital admission  Presented to outpatient IR for planned left lower lung mass biopsy; sharlene-procedural complication due to patient movement, leading to hemoptysis and hypoxia  Medical emergency called, ICU evaluated patient alongside IR  Biopsy result pending at the time of the discharge and recommend outpatient follow-up with PCP/pulmonology.       4/25/24 Dr. Schofield  Unfortunately, his biopsy was complicated by  hemothorax and required an additional stay in the hospital.   discussed that this appears to be a non-small cell lung cancer.   This does appear to be a clinical stage I.   With the patient's comorbidities and his active smoking, I believe that the patient would be best served with stereotactic body radiation therapy.           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 Left lung nodule, biopsy: Non-small cell carcinoma consistent with a squamous cell carcinoma.          Review of Systems:  Review of Systems   Constitutional: Negative.    HENT: Negative.     Eyes: Negative.    Respiratory:  Positive for cough (Daily, productive,  with clear mucous, was blood tinged up until yesterday) and shortness of breath (with exertion). Negative for chest tightness and wheezing.    Cardiovascular:  Negative for chest pain.   Gastrointestinal: Negative.    Endocrine: Negative.    Genitourinary: Negative.    Musculoskeletal: Negative.    Skin: Negative.    Allergic/Immunologic: Negative.    Neurological: Negative.    Hematological: Negative.    Psychiatric/Behavioral: Negative.         Clinical Trial: no    Pain assessment: 0    PFT: No    Prior Radiation: No    Teaching: NCI radiation packet    MST: Completed     Implantable Devices (Port, pacemaker, pain stimulator): No    Hip Replacement: No    Health Maintenance   Topic Date Due    Hepatitis A Vaccine (1 of 2 - Risk 2-dose series) Never done    Zoster Vaccine (1 of 2) Never done    COVID-19 Vaccine (6 - 2023-24 season) 11/29/2023    BMI: Followup Plan  01/27/2024     Medicare Annual Wellness Visit (AWV)  08/02/2024    HEMOGLOBIN A1C  09/05/2024    Fall Risk  11/27/2024    Diabetic Foot Exam  02/21/2025    BMI: Adult  04/25/2025    Depression Screening  05/03/2025    DM Eye Exam  02/01/2026    Hepatitis C Screening  Completed    Pneumococcal Vaccine: 65+ Years  Completed    Influenza Vaccine  Completed    HIB Vaccine  Aged Out    IPV Vaccine  Aged Out    Meningococcal ACWY Vaccine  Aged Out    HPV Vaccine  Aged Out    Lung Cancer Screening  Discontinued    Colorectal Cancer Screening  Discontinued       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 ([facility]) 09/10/2021    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 ([facility]) 02/24/2023    Basal cell carcinoma     right cheek    CAD (coronary artery disease)     Carotid stenosis, asymptomatic, bilateral     Chronic kidney disease     Colon polyp     Dependence on respirator (ventilator) status ([facility]) 04/07/2023    Diabetes ([facility])     type 2, non-insulin dependent    Diabetes mellitus ([facility])     GERD (gastroesophageal reflux disease)     History of nephrolithiasis     Hyperlipidemia     Hypertension     Left foot pain 11/30/2022    PAD (peripheral artery disease) ([facility])     Severe aortic stenosis     Squamous cell skin cancer 11/22/2022    left superior helix       Past Surgical History:   Procedure Laterality Date    APPENDECTOMY      CARDIAC CATHETERIZATION N/A 05/05/2022    Procedure: CARDIAC RHC/LHC;  Surgeon: Arvin Sanchez DO;  Location: [facility];  Service: Cardiology    CARDIAC CATHETERIZATION N/A 05/05/2022    Procedure: Cardiac Coronary Angiogram;  Surgeon: Arvin Sanchez DO;  Location: [facility];  Service: Cardiology    CARDIAC CATHETERIZATION N/A 05/24/2022    Procedure: Cardiac pci;  Surgeon: Damien Jeter MD;  Location: [facility];  Service: Cardiology    CARDIAC  CATHETERIZATION N/A 06/14/2022    Procedure: CARDIAC TAVR;  Surgeon: Nahum Vaz MD;  Location: [facility];  Service: Cardiology    COLECTOMY      COLONOSCOPY  2013    CORONARY ARTERY BYPASS GRAFT  2013    X 2    FEMORAL ARTERY - POPLITEAL ARTERY BYPASS GRAFT      HEMORRHOID SURGERY      IR AORTAGRAM WITH RUN-OFF  11/19/2018    IR AORTAGRAM WITH RUN-OFF  03/02/2023    IR BIOPSY LUNG  4/17/2024    IR LOWER EXTREMITY ANGIOGRAM  03/23/2023    MOHS SURGERY Left 01/11/2023    SCC left superior helix-Dr Guy    KS SLCTV CATHJ 3RD+ ORD SLCTV ABDL PEL/LXTR BRNCH Left 08/12/2016    Procedure: LEFT FEMORAL ARTERIOGRAM; BALLOON ANGIOPLASTY; SFA  AND FEMORAL AT VEIN GRAFT;  Surgeon: Nicholas Urena MD;  Location: [facility];  Service: Vascular    KS TEAEC W/WO PATCH GRAFT COMMON FEMORAL Right 03/23/2023    Procedure: Common femoral endarterectomy&antegrade intervention of SFA, popliteal artery w/ shockwave;  Surgeon: Eagle Higuera MD;  Location: [facility];  Service: Vascular    KS TRANSCATHETER TRANSAPICAL REPLACEMT AORTIC VALVE N/A 06/14/2022    Procedure: REPLACEMENT AORTIC VALVE TRANSCATHETER (TAVR) TRANSAPICAL 29MM RIVIN KYLER S3 ULTRA VALVE(ACCESS ON LEFT) ELANA;  Surgeon: Amarjit Gordon DO;  Location: [facility];  Service: Cardiac Surgery    SKIN CANCER EXCISION      TONSILLECTOMY AND ADENOIDECTOMY      UPPER GASTROINTESTINAL ENDOSCOPY         Family History   Problem Relation Age of Onset    Heart attack Father     Other Sister         bypass and vlave replacement    Stroke Paternal Uncle     Arrhythmia Neg Hx     Asthma Neg Hx     Clotting disorder Neg Hx     Fainting Neg Hx     Anuerysm Neg Hx     Hypertension Neg Hx         unsure     Hyperlipidemia Neg Hx     Heart failure Neg Hx        Social History     Tobacco Use    Smoking status: Every Day     Current packs/day: 1.00     Average packs/day: 0.6 packs/day for 100.0 years (62.5 ttl pk-yrs)     Types: Cigarettes     Passive exposure: Current     Smokeless tobacco: Never    Tobacco comments:     1/2 ppd   Vaping Use    Vaping status: Never Used   Substance Use Topics    Alcohol use: Not Currently     Comment: rare    Drug use: No          Current Outpatient Medications:     allopurinol (ZYLOPRIM) 300 mg tablet, Take 1 tablet (300 mg total) by mouth daily, Disp: 90 tablet, Rfl: 1    amLODIPine (NORVASC) 10 mg tablet, TAKE 1 TABLET DAILY, Disp: 90 tablet, Rfl: 3    atorvastatin (LIPITOR) 80 mg tablet, TAKE 1 TABLET DAILY AT     BEDTIME, Disp: 90 tablet, Rfl: 3    calcitriol (ROCALTROL) 0.25 mcg capsule, Take 1 capsule (0.25 mcg total) by mouth daily, Disp: 90 capsule, Rfl: 4    clopidogrel (PLAVIX) 75 mg tablet, Take 1 tablet (75 mg total) by mouth daily, Disp: 10 tablet, Rfl: 1    glimepiride (AMARYL) 1 mg tablet, Take 1 tablet (1 mg total) by mouth daily with breakfast, Disp: 90 tablet, Rfl: 1    hydrALAZINE (APRESOLINE) 25 mg tablet, Take 1 tablet (25 mg total) by mouth 2 (two) times a day, Disp: 180 tablet, Rfl: 3    metoprolol succinate (TOPROL-XL) 25 mg 24 hr tablet, Take 0.5 tablets (12.5 mg total) by mouth daily, Disp: 90 tablet, Rfl: 1    pancrelipase, Lip-Prot-Amyl, (CREON) 24,000 units, Take 24,000 units of lipase by mouth 3 (three) times a day with meals, Disp: 360 capsule, Rfl: 1    pantoprazole (PROTONIX) 40 mg tablet, Take 1 tablet (40 mg total) by mouth 2 (two) times a day, Disp: 180 tablet, Rfl: 1    mupirocin (BACTROBAN) 2 % ointment, Apply topically 3 (three) times a day (Patient not taking: Reported on 3/7/2024), Disp: 22 g, Rfl: 0    torsemide (DEMADEX) 20 mg tablet, TAKE 0.5 TABLETS (10 MG TOTAL) BY MOUTH DAILY TAKES 10 MG ONCE A DAY. (Patient not taking: Reported on 5/3/2024), Disp: 90 tablet, Rfl: 3    No Known Allergies     Vitals:    05/03/24 1104   BP: 138/60   BP Location: Left arm   Patient Position: Sitting   Cuff Size: Standard   Pulse: 60   Resp: 18   Temp: 97.9 °F (36.6 °C)   TempSrc: Temporal   SpO2: 98%       Pain Score: 0-No  pain

## 2024-05-03 NOTE — LETTER
May 3, 2024     Uzma Delgado PA-C  [address] [facility] [id number]    Patient: Jay Roach Jr.   YOB: 1945   Date of Visit: 5/3/2024       Dear Dr. Delgado:    Thank you for referring Jay Roach to me for evaluation. Below are my notes for this consultation.    If you have questions, please do not hesitate to call me. I look forward to following your patient along with you.         Sincerely,        Lilia Herrera MD        CC: MD Lilia Lopez MD  5/3/2024  3:02 PM  Sign when Signing Visit  Consultation - Radiation Oncology      MRN:[id number] : 1945  Encounter: [id number]  Patient Information: Jay Roach Jr.      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 is a 79 y.o. man with multiple comorbidities including CKD, PAD, CAD s/p CABG , aortic stenosis s/p TAVR, DM and current smoker who was recently diagnosed with stage I squamous cell carcinoma of the left lung.  He is being referred by Dr. Schofield for consideration of SBRT since he is not a surgical candidate.     24 PCP, Dr. Hadley  encouraged to quit using tobacco   Pulmonary nodules  -     CT chest wo contrast     24 CT chest wo contrast  1. Enlargement of the left lower lobe lung nodule which now has a diameter of 9 mm.   Indeterminate solid pulmonary nodule measuring 9 mm. In a patient of unknown risk level with a solid nodule >8 mm, consider PET/CT or tissue sampling, vs. CT at 3 months.      3/21/24 Dr. Schofield  CT scan demonstrates an approximately 9 mm pulmonary nodule.    concerning for lung cancer.   At this time, I am recommending a PET CT scan as well as PFTs and an IR biopsy.      4/11/24 PET CT  1. Patient's known increasing in size left lower lobe lung nodule currently measures 1.2 cm in maximal dimension and appears to be associated with mild misregistered focal FDG activity. Findings are highly suspicious for developing hypermetabolic   malignancy and further evaluation with tissue sampling is recommended.   2. No evidence for hypermetabolic metastatic disease.     4/17/24 IR lung biopsy  Pathology demonstrated NSCLC, SCC     4/17/24 Hospital admission  Presented to outpatient IR for planned left lower lung mass biopsy; sharlene-procedural complication due to patient movement, leading to hemoptysis and hypoxia  Medical emergency called, ICU evaluated patient alongside IR  Biopsy result pending at the time of the discharge and recommend outpatient follow-up with PCP/pulmonology.      4/25/24 Dr. Schofield  Unfortunately, his biopsy was complicated by hemothorax and required an additional stay in the hospital.   discussed that this appears to be a non-small cell lung cancer.   This does appear to be a clinical stage I.   With the patient's comorbidities and his active smoking, I believe that the patient would be best served with stereotactic body radiation therapy.     History was reviewed with patient and acknowledged.  He notes hemoptysis from hemothorax resolved completely about 1-2 days ago.  He denies shortness of breath at rest, progressive cough, fever, or progressive dyspnea on exertion.  He is able to perform daily activities of living without symptoms.  He does not use supplemental O2.  He denies chest pain, palpitations, lower extremity edema.    He is still actively smoking and is not planning on quitting at this time.    Historical Information   Oncology History   Primary non-small cell carcinoma of lower lobe of left lung (HCC)   3/21/2024 Initial Diagnosis     Primary non-small cell carcinoma of lower lobe of left lung (HCC)     4/17/2024 -  Cancer Staged    Staging form: Lung, AJCC 8th Edition  - Clinical stage from 4/17/2024: Stage IA2 (cT1b, cN0, cM0) - Signed by Uzma Delgado PA-C on 4/25/2024  Histopathologic type: Squamous cell carcinoma, NOS       4/17/2024 Biopsy    IR lung biopsy    A. Left lung nodule, biopsy: Non-small cell carcinoma consistent with a squamous cell carcinoma.            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 ([facility]) 09/10/2021   • 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 ([facility]) 02/24/2023   • Basal cell carcinoma     right cheek   • CAD (coronary artery disease)    • Carotid stenosis, asymptomatic, bilateral    • Chronic kidney disease    • Colon polyp    • Dependence on respirator (ventilator) status ([facility]) 04/07/2023   • Diabetes ([facility])     type 2, non-insulin dependent   • Diabetes mellitus ([facility])    • GERD (gastroesophageal reflux disease)    • History of nephrolithiasis    • Hyperlipidemia    • Hypertension    • Left foot pain 11/30/2022   • PAD (peripheral artery disease) ([facility])    • Severe aortic stenosis    • Squamous cell skin cancer 11/22/2022    left superior helix     Past Surgical History:   Procedure Laterality Date   • APPENDECTOMY     • CARDIAC CATHETERIZATION N/A 05/05/2022    Procedure: CARDIAC RHC/LHC;  Surgeon: Arvin Sanchez DO;  [facility]: [facility];  Service: Cardiology   • CARDIAC CATHETERIZATION N/A 05/05/2022    Procedure: Cardiac Coronary Angiogram;  Surgeon: Arvin Sanchez DO;  [facility];  Service: Cardiology   • CARDIAC CATHETERIZATION N/A 05/24/2022    Procedure: Cardiac pci;  Surgeon: Damien Jeter MD;  [facility];  Service: Cardiology   • CARDIAC CATHETERIZATION N/A 06/14/2022    Procedure: CARDIAC TAVR;  Surgeon: Nahum  MD Milind;  Location: [facility];  Service: Cardiology   • COLECTOMY     • COLONOSCOPY  2013   • CORONARY ARTERY BYPASS GRAFT  2013    X 2   • FEMORAL ARTERY - POPLITEAL ARTERY BYPASS GRAFT     • HEMORRHOID SURGERY     • IR AORTAGRAM WITH RUN-OFF  11/19/2018   • IR AORTAGRAM WITH RUN-OFF  03/02/2023   • IR BIOPSY LUNG  4/17/2024   • IR LOWER EXTREMITY ANGIOGRAM  03/23/2023   • MOHS SURGERY Left 01/11/2023    SCC left superior helix-Dr Tovar   • [place] SLCTV CATHJ 3RD+ ORD SLCTV ABDL PEL/LXTR BRNCH Left 08/12/2016    Procedure: LEFT FEMORAL ARTERIOGRAM; BALLOON ANGIOPLASTY; SFA  AND FEMORAL AT VEIN GRAFT;  Surgeon: Nicholas Urena MD;  Location: [facility];  Service: Vascular   • KY TEAEC W/WO PATCH GRAFT COMMON FEMORAL Right 03/23/2023    Procedure: Common femoral endarterectomy&antegrade intervention of SFA, popliteal artery w/ shockwave;  Surgeon: Eagle Higuera MD;  Location: [facility];  Service: Vascular   • [place] TRANSCATHETER TRANSAPICAL REPLACEMT AORTIC VALVE N/A 06/14/2022    Procedure: REPLACEMENT AORTIC VALVE TRANSCATHETER (TAVR) TRANSAPICAL 29MM IRVIN KYLER S3 ULTRA VALVE(ACCESS ON LEFT) ELANA;  Surgeon: Amarjit Gordon DO;  Location: [facility];  Service: Cardiac Surgery   • SKIN CANCER EXCISION     • TONSILLECTOMY AND ADENOIDECTOMY     • UPPER GASTROINTESTINAL ENDOSCOPY         Family History   Problem Relation Age of Onset   • Heart attack Father    • Other Sister         bypass and vlave replacement   • Stroke Paternal Uncle    • Arrhythmia Neg Hx    • Asthma Neg Hx    • Clotting disorder Neg Hx    • Fainting Neg Hx    • Anuerysm Neg Hx    • Hypertension Neg Hx         unsure    • Hyperlipidemia Neg Hx    • Heart failure Neg Hx        Social History   Social History     Substance and Sexual Activity   Alcohol Use Not Currently    Comment: rare     Social History     Substance and Sexual Activity   Drug Use No     Social History     Tobacco Use   Smoking Status Every Day   • Current packs/day: 1.00    • Average packs/day: 0.6 packs/day for 100.0 years (62.5 ttl pk-yrs)   • Types: Cigarettes   • Passive exposure: Current   Smokeless Tobacco Never   Tobacco Comments    1/2 ppd         Meds/Allergies     Current Outpatient Medications:   •  allopurinol (ZYLOPRIM) 300 mg tablet, Take 1 tablet (300 mg total) by mouth daily, Disp: 90 tablet, Rfl: 1  •  amLODIPine (NORVASC) 10 mg tablet, TAKE 1 TABLET DAILY, Disp: 90 tablet, Rfl: 3  •  atorvastatin (LIPITOR) 80 mg tablet, TAKE 1 TABLET DAILY AT     BEDTIME, Disp: 90 tablet, Rfl: 3  •  calcitriol (ROCALTROL) 0.25 mcg capsule, Take 1 capsule (0.25 mcg total) by mouth daily, Disp: 90 capsule, Rfl: 4  •  clopidogrel (PLAVIX) 75 mg tablet, Take 1 tablet (75 mg total) by mouth daily, Disp: 10 tablet, Rfl: 1  •  glimepiride (AMARYL) 1 mg tablet, Take 1 tablet (1 mg total) by mouth daily with breakfast, Disp: 90 tablet, Rfl: 1  •  hydrALAZINE (APRESOLINE) 25 mg tablet, Take 1 tablet (25 mg total) by mouth 2 (two) times a day, Disp: 180 tablet, Rfl: 3  •  metoprolol succinate (TOPROL-XL) 25 mg 24 hr tablet, Take 0.5 tablets (12.5 mg total) by mouth daily, Disp: 90 tablet, Rfl: 1  •  pancrelipase, Lip-Prot-Amyl, (CREON) 24,000 units, Take 24,000 units of lipase by mouth 3 (three) times a day with meals, Disp: 360 capsule, Rfl: 1  •  pantoprazole (PROTONIX) 40 mg tablet, Take 1 tablet (40 mg total) by mouth 2 (two) times a day, Disp: 180 tablet, Rfl: 1  •  mupirocin (BACTROBAN) 2 % ointment, Apply topically 3 (three) times a day (Patient not taking: Reported on 3/7/2024), Disp: 22 g, Rfl: 0  •  torsemide (DEMADEX) 20 mg tablet, TAKE 0.5 TABLETS (10 MG TOTAL) BY MOUTH DAILY TAKES 10 MG ONCE A DAY. (Patient not taking: Reported on 5/3/2024), Disp: 90 tablet, Rfl: 3  No Known Allergies      Review of Systems   Constitutional: Negative.    HENT: Negative.     Eyes: Negative.    Respiratory:  Positive for cough (Daily, productive,  with clear mucous, was blood tinged up until  yesterday) and shortness of breath (with exertion). Negative for chest tightness and wheezing.    Cardiovascular:  Negative for chest pain.   Gastrointestinal: Negative.    Endocrine: Negative.    Genitourinary: Negative.    Musculoskeletal: Negative.    Skin: Negative.    Allergic/Immunologic: Negative.    Neurological: Negative.    Hematological: Negative.    Psychiatric/Behavioral: Negative.           OBJECTIVE:   /60 (BP Location: Left arm, Patient Position: Sitting, Cuff Size: Standard)   Pulse 60   Temp 97.9 °F (36.6 °C) (Temporal)   Resp 18   SpO2 98%   Performance Status: Karnofsky: 80 - Normal activity with effort; some signs or symptoms of disease    Physical Exam  Vitals and nursing note reviewed.   Constitutional:       General: He is not in acute distress.  Cardiovascular:      Rate and Rhythm: Normal rate and regular rhythm.      Heart sounds: Murmur (systolic murmur) heard.   Pulmonary:      Breath sounds: No wheezing, rhonchi or rales.   Abdominal:      Palpations: Abdomen is soft.      Tenderness: There is no abdominal tenderness.   Musculoskeletal:      Right lower leg: No edema.      Left lower leg: No edema.   Lymphadenopathy:      Cervical: No cervical adenopathy.   Neurological:      Mental Status: He is alert and oriented to person, place, and time.      Gait: Gait normal.        RESULTS  Imaging Studies  IR biopsy lung    Result Date: 4/17/2024  Narrative: PROCEDURE: CT-guided lung biopsy Procedural Personnel Attending physician(s): Damian Mills MD Pre-procedure diagnosis: Hypermetabolic solid pulmonary nodule Post-procedure diagnosis: Same Clinical History: 79-year-old man with history of CAD, CKD3, T2DM, prior smoking, prior asbestos exposure, and an enlarging left lung mildly hypermetabolic solitary pulmonary nodule suspicious for malignancy. He has been referred for image-guided biopsy for further evaluation. PROCEDURE SUMMARY: CT-guided lung biopsy PROCEDURE  DETAILS: Pre-procedure Consent: Informed consent for the procedure including risks, benefits and alternatives was obtained and time-out was performed prior to the procedure. Preparation: The site was prepared and draped using maximal sterile barrier technique including cutaneous antisepsis. Anesthesia/sedation Level of anesthesia/sedation: Moderate sedation (conscious sedation) Anesthesia/sedation administered by: Independent trained observer under attending supervision with continuous monitoring of the patient's level of consciousness and physiologic status Total intra-service sedation time: 45 min Technique and Findings: The patient was positioned prone on the CT bed. Initial preprocedure CT demonstrated a solid pulmonary nodule positioned peripherally in the left lower lobe, previously characterized as hypermetabolic. Given its position, decision was made to access from  a posterior approach, as a lateral approach was thought to provide insufficient purchase for safe sampling. Local anesthetic was administered. A dermatotomy was made. Under intermittent CT guidance, a 17-gauge coaxial introducer needle was advanced percutaneously into the lung. Unfortunately, during advancement of the access needle, the patient moved his left shoulder somewhat while sedated, resulting in deviation of the initial access pathway. Pulmonary hemorrhage developed along the initial access tract. The needle was repositioned to permit sampling of the lung nodule. A moderate volume of intraparenchymal hemorrhage continue to develop without hemoptysis. Vital signs were stable throughout. Two 18-gauge by 1.3 cm biopsy specimens were obtained using a BioPince device. At this point, the patient developed coughing. Interval CT demonstrated no pneumothorax but enlarging parenchymal hemorrhage. There was no significant volume of blood products  in the central airways or the right lung. He then developed hemoptysis productive of a significant  volume of bright red blood. Decision was made to terminate the procedure. An attempt was made at using a BioSentry device to seal the access tract; however, due to blood return along the access needle, the BioSentry device expanded within the needle prior to complete device deployment. Given ongoing hemoptysis, a second BioSentry device was not opened and the needle was promptly removed in order to turn the patient to a left lateral decubitus position and protect the central airways/right lung. Vital signs were monitored and oxygen saturations were maintained throughout. His blood pressure decreased from 170 mmHg systolic at the beginning of the procedure down to 110s. Due to ongoing hemoptysis and development of relative hypotension, a rapid response was called. Interval images were obtained during evaluation by the rapid response team, demonstrating stable left lung pulmonary hemorrhage and no pneumothorax. Decision was made to transfer to the emergency department for stabilization and likely admission. Contrast None. Radiation Dose CT-DLP: 1383 mGy-cm Additional Details Specimens removed: None Estimated blood loss (mL): 5 mL external blood loss, intraparenchymal hemorrhage not quantifiable. Complications: Pulmonary parenchymal hemorrhage requiring rapid response, emergency department evaluation, and admission.     Impression: CT-guided lung biopsy complicated by significant pulmonary hemorrhage and hemoptysis. Two samples were obtained and sent for pathology prior to termination of the procedure. Plan: Emergency department evaluation and admission. Workstation performed: [id number]     NM PET CT skull base to mid thigh    Result Date: 4/12/2024  Narrative: PET/CT SCAN INDICATION: D38.1: Neoplasm of uncertain behavior of trachea, bronchus and lung R91.8: Other nonspecific abnormal finding of lung field MODIFIER: PI COMPARISON: CT of chest dated 2/28/2024 and CT of abdomen and pelvis dated 12/22/2023 CELL TYPE:  Not applicable TECHNIQUE:   8.4 mCi F-18-FDG administered IV. Multiplanar attenuation corrected and non attenuation corrected PET images are available for interpretation, and contiguous, low dose, axial CT sections were obtained from the skull vertex through the femurs. Intravenous contrast material was not utilized. This examination, like all CT scans performed in the [facility], was performed utilizing techniques to minimize radiation dose exposure, including the use of iterative reconstruction and automated exposure control. Fasting serum glucose: 104 mg/dl FINDINGS: VISUALIZED BRAIN: No acute abnormalities are seen. HEAD/NECK: There is a physiologic distribution of FDG. No FDG avid cervical adenopathy is seen. CT images: Intracranial atherosclerotic calcification is noted. CHEST: Mild focal FDG activity on PET image 141 with max SUV of 1.6 is likely misregistered (due to respiratory motion artifact) to the patient's known irregular left lower lobe lung nodule located slightly inferiorly on image 144 of series 3 which measures 1.2  cm. Developing malignancy is the diagnosis of exclusion and further evaluation with tissue sampling is recommended as clinically indicated. On image 154 of series 3 there is calcification about the peripheral aspect of the left ventricular apex which could reflect myocardial versus pericardial calcification without significant interval change. CT images: Atherosclerotic vascular calcifications including those of the coronary arteries are noted. Mild emphysematous changes. Nonspecific interstitial thickening involving the dependent aspects of the bilateral lower lobes. ABDOMEN: Nonspecific patchy FDG activity involving the gastric wall, possibly physiologic/inflammatory in etiology. CT images: Streak artifact related to patient's arms being down limits evaluation on low-dose unenhanced CT. Cholelithiasis. Atherosclerotic vascular calcifications are noted. Nonobstructing  left renal calculus. Pancreatic calcifications suggestive of sequela chronic pancreatitis. Postsurgical changes about the cecum. Diverticulosis coli. PELVIS: Mild nonspecific heterogeneous prostate activity, slightly asymmetric towards the right but not definitively focal in nature, possibly physiologic/inflammatory in etiology. CT images: Postsurgical changes in the bilateral inguinal regions. Heterogeneous prostate gland. OSSEOUS STRUCTURES: No FDG avid lesions are seen. CT images: Degenerative changes are noted involving the spine. Median sternotomy wires are present.     Impression: 1. Patient's known increasing in size left lower lobe lung nodule currently measures 1.2 cm in maximal dimension and appears to be associated with mild misregistered focal FDG activity. Findings are highly suspicious for developing hypermetabolic malignancy and further evaluation with tissue sampling is recommended. 2. No evidence for hypermetabolic metastatic disease. Please see above for details and additional findings. The study was marked in EPIC for significant notification. Workstation performed: [id number]         Pathology:Collected 4/17/2024 09:07     Status: Final result     Visible to patient: No (inaccessible in [facility])     0 Result Notes      Component    Case Report   Surgical Pathology Report                         Case: [id number]                                   Authorizing Provider:  Ly Schofield MD    Collected:           04/17/2024 0907               Ordering Location:     [facility] Received:            04/17/2024 1117                                      CT Scan                                                                       Pathologist:           Bang Porras MD                                                          Specimen:    Lung, left nodule                                                                          Final Diagnosis   A.  Left lung nodule,  biopsy:  Non-small cell carcinoma consistent with a squamous cell carcinoma.       Electronically signed by Bang Porras MD on 4/23/2024 at 121              ASSESSMENT  1. Primary non-small cell carcinoma of lower lobe of left lung (HCC)  Ambulatory Referral to Radiation Oncology        Cancer Staging   Primary non-small cell carcinoma of lower lobe of left lung (HCC)  Staging form: Lung, AJCC 8th Edition  - Clinical stage from 4/17/2024: Stage IA2 (cT1b, cN0, cM0) - Signed by Uzma Delgado PA-C on 4/25/2024  Histopathologic type: Squamous cell carcinoma, NOS        PLAN/DISCUSSION  Jay Roach Jr. is a 79 y.o. man with multiple comorbidities and nonsurgical candidate who was recently diagnosed with stage I NSCLC, squamous cell carcinoma of the left lung.      I reviewed imaging and results with the patient today.  Given location of his tumor and his clinical presentation, I agree with the recommendations for definitive stereotactic body radiotherapy (SBRT).  We would plan a dose of 50-55Gy in 3 to 5 fractions.  This technique has demonstrated 80-90+% local control and cure rates for stage I NSCLC.  Recommendations follow NCCN guidelines.    Treatments are generally well tolerated.    Based on location of the tumor, the main concerns for toxicity would be pulmonary fibrosis with decreased function, pneumonitis, and rib fracture or other chest wall injury.  The risk for pneumonitis and fracture are low, but can be chronic and/or very symptomatic.  Pulmonary fibrosis is expected, but SBRT is the most lung sparing of definitve lung cancer treatments presently.  He may also experience some fatigue.    He was given a chance to ask questions that were answered at length.  He wished to proceed with the recommended treatment plan.      4DCT simulation next week.  We will plan to begin radiation soon thereafter.  Smoking cessation counseling provided, but patient is not ready to quit.    Lilia Herrera  "MD  5/3/2024,12:35 PM      Portions of the record may have been created with voice recognition software.  Occasional wrong word or \"sound a like\" substitutions may have occurred due to the inherent limitations of voice recognition software.  Read the chart carefully and recognize, using context, where substitutions have occurred.             "

## 2024-05-07 DIAGNOSIS — C34.92 SQUAMOUS CELL CARCINOMA OF LEFT LUNG (HCC): Primary | ICD-10-CM

## 2024-05-08 ENCOUNTER — PATIENT OUTREACH (OUTPATIENT)
Dept: FAMILY MEDICINE CLINIC | Facility: CLINIC | Age: 79
End: 2024-05-08

## 2024-05-08 ENCOUNTER — APPOINTMENT (OUTPATIENT)
Dept: RADIATION ONCOLOGY | Facility: CLINIC | Age: 79
End: 2024-05-08
Payer: MEDICARE

## 2024-05-08 PROCEDURE — 77263 THER RADIOLOGY TX PLNG CPLX: CPT | Performed by: RADIOLOGY

## 2024-05-08 NOTE — PROGRESS NOTES
Follow up call with patient. He is doing well. He is to start Radiation Treatments for stage 1 Lung Ca.  He is doing well walking daily and gardening.  Checks his blood sugar once a week, last blood sugar was 118.  Hgb 9.2, last checked. We discussed food rich in Iron.  Denies concerns or need for assistance at this time.  Will follow up again in one month.

## 2024-05-10 ENCOUNTER — APPOINTMENT (OUTPATIENT)
Dept: RADIATION ONCOLOGY | Facility: CLINIC | Age: 79
End: 2024-05-10
Attending: RADIOLOGY
Payer: MEDICARE

## 2024-05-10 ENCOUNTER — APPOINTMENT (OUTPATIENT)
Dept: LAB | Facility: MEDICAL CENTER | Age: 79
End: 2024-05-10
Payer: MEDICARE

## 2024-05-10 DIAGNOSIS — N25.81 SECONDARY HYPERPARATHYROIDISM OF RENAL ORIGIN (HCC): ICD-10-CM

## 2024-05-10 DIAGNOSIS — N18.4 ACUTE RENAL FAILURE SUPERIMPOSED ON STAGE 4 CHRONIC KIDNEY DISEASE, UNSPECIFIED ACUTE RENAL FAILURE TYPE (HCC): ICD-10-CM

## 2024-05-10 DIAGNOSIS — N18.4 CKD (CHRONIC KIDNEY DISEASE) STAGE 4, GFR 15-29 ML/MIN (HCC): ICD-10-CM

## 2024-05-10 DIAGNOSIS — Z87.19 HISTORY OF GI BLEED: ICD-10-CM

## 2024-05-10 DIAGNOSIS — N17.9 ACUTE RENAL FAILURE SUPERIMPOSED ON STAGE 4 CHRONIC KIDNEY DISEASE, UNSPECIFIED ACUTE RENAL FAILURE TYPE (HCC): ICD-10-CM

## 2024-05-10 DIAGNOSIS — E55.9 VITAMIN D DEFICIENCY: ICD-10-CM

## 2024-05-10 DIAGNOSIS — D50.9 IRON DEFICIENCY ANEMIA, UNSPECIFIED IRON DEFICIENCY ANEMIA TYPE: ICD-10-CM

## 2024-05-10 LAB
ALBUMIN SERPL BCP-MCNC: 3.7 G/DL (ref 3.5–5)
ANION GAP SERPL CALCULATED.3IONS-SCNC: 9 MMOL/L (ref 4–13)
BUN SERPL-MCNC: 29 MG/DL (ref 5–25)
CALCIUM SERPL-MCNC: 8.9 MG/DL (ref 8.4–10.2)
CHLORIDE SERPL-SCNC: 107 MMOL/L (ref 96–108)
CO2 SERPL-SCNC: 26 MMOL/L (ref 21–32)
CREAT SERPL-MCNC: 1.88 MG/DL (ref 0.6–1.3)
CREAT UR-MCNC: 103.7 MG/DL
GFR SERPL CREATININE-BSD FRML MDRD: 32 ML/MIN/1.73SQ M
GLUCOSE P FAST SERPL-MCNC: 128 MG/DL (ref 65–99)
MAGNESIUM SERPL-MCNC: 2.1 MG/DL (ref 1.9–2.7)
MICROALBUMIN UR-MCNC: 608.4 MG/L
MICROALBUMIN/CREAT 24H UR: 587 MG/G CREATININE (ref 0–30)
PHOSPHATE SERPL-MCNC: 3.4 MG/DL (ref 2.3–4.1)
POTASSIUM SERPL-SCNC: 4 MMOL/L (ref 3.5–5.3)
PTH-INTACT SERPL-MCNC: 100.7 PG/ML (ref 12–88)
SODIUM SERPL-SCNC: 142 MMOL/L (ref 135–147)

## 2024-05-10 PROCEDURE — 77334 RADIATION TREATMENT AID(S): CPT | Performed by: RADIOLOGY

## 2024-05-10 PROCEDURE — 36415 COLL VENOUS BLD VENIPUNCTURE: CPT

## 2024-05-10 PROCEDURE — 83970 ASSAY OF PARATHORMONE: CPT

## 2024-05-10 PROCEDURE — 82043 UR ALBUMIN QUANTITATIVE: CPT

## 2024-05-10 PROCEDURE — 80069 RENAL FUNCTION PANEL: CPT

## 2024-05-10 PROCEDURE — 83735 ASSAY OF MAGNESIUM: CPT

## 2024-05-10 PROCEDURE — 77470 SPECIAL RADIATION TREATMENT: CPT | Performed by: RADIOLOGY

## 2024-05-10 PROCEDURE — 82570 ASSAY OF URINE CREATININE: CPT

## 2024-05-14 ENCOUNTER — OFFICE VISIT (OUTPATIENT)
Dept: NEPHROLOGY | Facility: CLINIC | Age: 79
End: 2024-05-14
Payer: MEDICARE

## 2024-05-14 ENCOUNTER — TELEPHONE (OUTPATIENT)
Age: 79
End: 2024-05-14

## 2024-05-14 VITALS
DIASTOLIC BLOOD PRESSURE: 48 MMHG | BODY MASS INDEX: 25.43 KG/M2 | HEART RATE: 53 BPM | HEIGHT: 70 IN | SYSTOLIC BLOOD PRESSURE: 168 MMHG | WEIGHT: 177.6 LBS

## 2024-05-14 DIAGNOSIS — D50.9 IRON DEFICIENCY ANEMIA, UNSPECIFIED IRON DEFICIENCY ANEMIA TYPE: ICD-10-CM

## 2024-05-14 DIAGNOSIS — N18.4 CONTROLLED TYPE 2 DIABETES MELLITUS WITH STAGE 4 CHRONIC KIDNEY DISEASE, WITHOUT LONG-TERM CURRENT USE OF INSULIN (HCC): ICD-10-CM

## 2024-05-14 DIAGNOSIS — N18.4 BENIGN HYPERTENSION WITH CKD (CHRONIC KIDNEY DISEASE) STAGE IV (HCC): ICD-10-CM

## 2024-05-14 DIAGNOSIS — E11.22 CONTROLLED TYPE 2 DIABETES MELLITUS WITH STAGE 4 CHRONIC KIDNEY DISEASE, WITHOUT LONG-TERM CURRENT USE OF INSULIN (HCC): ICD-10-CM

## 2024-05-14 DIAGNOSIS — I12.9 BENIGN HYPERTENSION WITH CKD (CHRONIC KIDNEY DISEASE) STAGE IV (HCC): ICD-10-CM

## 2024-05-14 DIAGNOSIS — N18.4 CKD (CHRONIC KIDNEY DISEASE) STAGE 4, GFR 15-29 ML/MIN (HCC): Primary | ICD-10-CM

## 2024-05-14 DIAGNOSIS — R80.1 PERSISTENT PROTEINURIA, UNSPECIFIED: ICD-10-CM

## 2024-05-14 DIAGNOSIS — I50.32 CHRONIC DIASTOLIC CHF (CONGESTIVE HEART FAILURE) (HCC): ICD-10-CM

## 2024-05-14 DIAGNOSIS — N25.81 SECONDARY HYPERPARATHYROIDISM OF RENAL ORIGIN (HCC): ICD-10-CM

## 2024-05-14 PROCEDURE — 99214 OFFICE O/P EST MOD 30 MIN: CPT | Performed by: INTERNAL MEDICINE

## 2024-05-14 RX ORDER — LISINOPRIL 5 MG/1
5 TABLET ORAL DAILY
Qty: 90 TABLET | Refills: 3 | Status: SHIPPED | OUTPATIENT
Start: 2024-05-14

## 2024-05-14 NOTE — PATIENT INSTRUCTIONS
-Renal Function is stable   -You have Chronic Kidney Disease Stage 4   -Avoid NSAIDs like Ibuprofen/Motrin, Naproxen/Aleve, Celebrex, meloxicam/Mobic, Diclofenac and other NSAIDs.  -Okay to take Acetaminophen/Tylenol if you do not have any liver problems  -Avoid IV contrast used for CT scan and cardiac catheterization.    -If plan for any study with IV contrast, please let me know so we could hydrate with fluids before and after IV contrast  -Dosage  of certain medications may need to be adjusted depending on Kidney function.     Blood pressure is  elevated- > started on lisinopril     -Recommend 2 g sodium diet.    -Recommend daily exercise and weight loss  -Discussed home monitoring of BP and maintaining a log of blood pressure.  -Recommend goal BP less than 130/80. Please inform me if SBP below 110 or above 140's persistently.    BMP in one month     Follow up: 4 months with repeat Lab work within a week of the scheduled office visit. Will discuss the results of the previsit Labs during the office visit.

## 2024-05-14 NOTE — PROGRESS NOTES
NEPHROLOGY OUTPATIENT PROGRESS NOTE   Jay Roach Jr. 79 y.o. 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     -Plan:   Renal function currently stable at creatinine 1.88 mg/dL with stable electrolytes.  Continue to monitor renal function continue to avoid NSAIDs and IV contrast.  Continue torsemide 10 mg daily.  Started on lisinopril 5 mg daily , BMP in one month    Patient not interested in Farxiga due to cost and side effect   CKD Education: not interested.  He mentions has been watching [employer] videos. Still not interested   Recommend avoiding metformin as GFR less than 30  Has record of living will in the system  Follow up in 4 months        Primary Hypertension with chronic kidney disease stage 4:   -Current medication:  Amlodipine 10 mg daily, Metoprolol 12.5 mg daily, Torsemide 10 mg , hydralazine 25 mg bid   -Current blood pressure:  elevated today but at home around 130-140s   -Plan:    Add lisinopril 5 mg daily. Continue other meds   -Recommend 2 g sodium diet.    -Recommend daily exercise and weight loss  -Discussed home monitoring of BP and maintaining a log of blood pressure.  -Recommend goal BP less than 130/80.      Chronic diastolic CHF/severe aortic stenosis status post TAVR  -Echo from 6/2023- was EF 65 %. Diastolic dysfunction  -Clinically euvolemic, continue torsemide 10 mg daily  -Recommend fluid restriction 1.8 L/min  - recommend taking extra torsemide in future if any weight gain more than 3 lb in a day or 5 lb in a week and to follow low-sodium  diet     Proteinuria, persistent  -UPC ratio worsened to albumin/cr ratio 587 mg/dl   -likely due to hypertensive nephrosclerosis  -Started lisinopril 5 mg daily to help with proteinuria  -last A1c 6.6     Hypomagnesemia  -mag wnl   -last Mag level wnl- taking multivitamin  -likely due to use of PPI      Secondary hyperparathyroidism of renal origin/ Vitamin D deficiency  -PTH  100.7 at target   -Continue calcitriol 0.25 mcg daily  -vitamin D  wnl in jan 64. Okay to take OTC vitamin . Check vitamin D      Anemia due to CKD stage 4 and due to iron deficiency  -hb improving to 9.8 g/dl , iron sat 12 %   -on oral iron tablets 325 mg ferrous sulfate daily , OTC      B/L nephrolithiasis/left renal cyst  -currently asymptomatic and patient not aware  -reviewed CT abdomen report from April 2022, finding of nonobstructing bilateral nephrolithiasis.  Largest 8 mm inferior pole of left kidney.   -Pt asymptomatic.  - no monitoring needed for renal cyst as it was a simple parapelvic cyst. Not mentioned about  Renal cyst.      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 Note plan radiation     Smoking/tobacco use, still smoking. Stressed on quitting      Hyperuricemia  -on allopurinol - 300 mg daily   -uric acid improved to 4.9   -low purine diet .     Patient Instructions   -Renal Function is stable   -You have Chronic Kidney Disease Stage 4   -Avoid NSAIDs like Ibuprofen/Motrin, Naproxen/Aleve, Celebrex, meloxicam/Mobic, Diclofenac and other NSAIDs.  -Okay to take Acetaminophen/Tylenol if you do not have any liver problems  -Avoid IV contrast used for CT scan and cardiac catheterization.     -If plan for any study with IV contrast, please let me know so we could hydrate with fluids before and after IV contrast  -Dosage  of certain medications may need to be adjusted depending on Kidney function.     Blood pressure is  elevated- > started on lisinopril     -Recommend 2 g sodium diet.    -Recommend daily exercise and weight loss  -Discussed home monitoring of BP and maintaining a log of blood pressure.  -Recommend goal BP less than 130/80. Please inform me if SBP below 110 or above 140's persistently.    BMP in one month     Follow up: 4 months with repeat Lab work within a week of the scheduled office visit. Will discuss the results of the previsit Labs during the office visit.       Diagnoses and all orders for this visit:    CKD (chronic kidney disease) stage 4, GFR 15-29 ml/min ([facility])  -     Basic metabolic panel; Future  -     Basic metabolic panel; Future  -     Protein / creatinine ratio, urine; Future  -     Albumin / creatinine urine ratio; Future  -     PTH, intact; Future  -     Uric acid; Future  -     Urinalysis with microscopic; Future  -     Vitamin D 25 hydroxy; Future  -     Phosphorus; Future  -     Magnesium; Future  -     CBC; Future  -     Iron Panel (Includes Ferritin, Iron Sat%, Iron, and TIBC); Future    Persistent proteinuria, unspecified  -     lisinopril (ZESTRIL) 5 mg tablet; Take 1 tablet (5 mg total) by mouth daily  -     Protein / creatinine ratio, urine; Future    Benign hypertension with CKD (chronic kidney disease) stage IV ([facility])  -     lisinopril (ZESTRIL) 5 mg tablet; Take 1 tablet (5 mg total) by mouth daily  -     Basic metabolic panel; Future    Secondary hyperparathyroidism of renal origin ([facility])  -     PTH, intact; Future    Iron deficiency anemia, unspecified iron deficiency anemia type  -     CBC; Future  -     Iron Panel (Includes Ferritin, Iron Sat%, Iron, and TIBC); Future    Chronic diastolic CHF (congestive heart failure) ([facility])  -     Basic metabolic panel;  Future    Controlled type 2 diabetes mellitus with stage 4 chronic kidney disease, without long-term current use of insulin (HCC)  -     Basic metabolic panel; Future  -     Protein / creatinine ratio, urine; Future              SUBJECTIVE / HPI:  Jay Roach Jr. is a 79 y.o.  male  with medical issues of chronic kidney disease, HTN x 15 yrs,  DM-2 anemia, colitis  , CAD s/p CABG who presents for follow-up of chronic kidney disease stage 3.     Review of records, patient has elevated creatinine dating back to 2018 November when the creatinine was 1.3.  It was stable at 1.4 to 1.7 in 2020.  Since June 2021 creatinine has been around 1.6-1.8  It had worsened to creatinine 2.49 on 12/14/21, likely prerenal in the setting of abdominal pain and diarrhea and renal function improved to creatinine 1.7-1.8 in January 2022.     Patient with baseline creatinine 1.6-1.8 he was at [facility] and underwent cardiac PCI on 05/24/22.  Creatinine since May has been around 2.3-2.4, improved to 2.0 on 05/25 likely due to hydration during PCI.     He was again admitted from June 14 to June 17,2022 underwent TAVR on June 14th 2022.     He was admitted in January 2024 to [facility] for GI bleed, underwent EGD and found to have gastric ulcers which were clipped.  Had acute kidney injury likely prerenal during the admission    During the office visit with advanced practitioner in February 2024 was told to hold torsemide for 3 days and resume at 10 mg daily.  He was taken off lisinopril during hospitalization in December 2023    He was admitted from 4/17 to 4/19/2024 for complaint of shortness of breath/hemoptysis after receiving IR guided lung biopsy for mass found in the left lung as outpatient.  Hemoglobin decreased to 9.7 g/dL.  Was found to be stable and discharged    Recently creatinine has been around 2.0-2.25 since October 2022 this is most likely his new baseline with finding of acute kidney injury in January 2024.   Renal function currently stable at creatinine 1.8-2.1    Blood work from 5/10/2024 showed creatinine 1.88 mg/dL, electrolytes stable normal phosphorus and magnesium.  Hemoglobin improved to 9.8 g/dL.  .7.  Albumin creatinine ratio 587 mg/g    Patient denies any new complaints, no chest pain or shortness of breath  Reviewed PCP note from 4/24/2024      REVIEW OF SYSTEMS:    Review of Systems   Constitutional:  Negative for activity change, appetite change, chills, diaphoresis, fatigue and fever.   HENT:  Negative for congestion, facial swelling and nosebleeds.    Eyes:  Negative for pain and visual disturbance.   Respiratory:  Negative for cough, chest tightness and shortness of breath.    Cardiovascular:  Negative for chest pain and palpitations.   Gastrointestinal:  Negative for abdominal distention, abdominal pain, diarrhea, nausea and vomiting.   Genitourinary:  Negative for difficulty urinating, dysuria, flank pain, frequency and hematuria.   Musculoskeletal:  Negative for arthralgias, back pain and joint swelling.   Skin:  Negative for rash.   Neurological:  Negative for dizziness, seizures, syncope, weakness and headaches.   Psychiatric/Behavioral:  Negative for agitation and confusion. The patient is not nervous/anxious.        More than 10 point review of systems were obtained and discussed in length with the patient. Complete review of systems were negative / unremarkable except mentioned above.       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 ([facility]) 09/10/2021    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 ([facility]) 02/24/2023    Basal cell carcinoma     right cheek    CAD (coronary artery disease)     Carotid stenosis, asymptomatic, bilateral     Chronic kidney disease     Colon polyp     Dependence on respirator (ventilator) status ([facility]) 04/07/2023     Diabetes (HCC)     type 2, non-insulin dependent    Diabetes mellitus (HCC)     GERD (gastroesophageal reflux disease)     History of nephrolithiasis     Hyperlipidemia     Hypertension     Left foot pain 11/30/2022    PAD (peripheral artery disease) (HCC)     Severe aortic stenosis     Squamous cell skin cancer 11/22/2022    left superior helix       PAST SURGICAL HISTORY:  Past Surgical History:   Procedure Laterality Date    APPENDECTOMY      CARDIAC CATHETERIZATION N/A 05/05/2022    Procedure: CARDIAC RHC/LHC;  Surgeon: Arvin Sanchez DO;  Location: [facility];  Service: Cardiology    CARDIAC CATHETERIZATION N/A 05/05/2022    Procedure: Cardiac Coronary Angiogram;  Surgeon: Arvin Sanchez DO;  Location: [facility];  Service: Cardiology    CARDIAC CATHETERIZATION N/A 05/24/2022    Procedure: Cardiac pci;  Surgeon: Damien Jeter MD;  [facility]: [facility];  Service: Cardiology    CARDIAC CATHETERIZATION N/A 06/14/2022    Procedure: CARDIAC TAVR;  Surgeon: Nahum Vaz MD;  Location: [facility];  Service: Cardiology    COLECTOMY      COLONOSCOPY  2013    CORONARY ARTERY BYPASS GRAFT  2013    X 2    FEMORAL ARTERY - POPLITEAL ARTERY BYPASS GRAFT      HEMORRHOID SURGERY      IR AORTAGRAM WITH RUN-OFF  11/19/2018    IR AORTAGRAM WITH RUN-OFF  03/02/2023    IR BIOPSY LUNG  4/17/2024    IR LOWER EXTREMITY ANGIOGRAM  03/23/2023    MOHS SURGERY Left 01/11/2023    SCC left superior helix-Dr Tovar    UT SLCTV CATHJ 3RD+ ORD SLCTV ABDL PEL/LXTR BRNCH Left 08/12/2016    Procedure: LEFT FEMORAL ARTERIOGRAM; BALLOON ANGIOPLASTY; SFA  AND FEMORAL AT VEIN GRAFT;  Surgeon: Nicholas Urena MD;  Location: [facility];  Service: Vascular    UT TEAEC W/WO PATCH GRAFT COMMON FEMORAL Right 03/23/2023    Procedure: Common femoral endarterectomy&antegrade intervention of SFA, popliteal artery w/ shockwave;  Surgeon: Eagle Higuera MD;  Location: [facility];  Service: Vascular    UT  TRANSCATHETER TRANSAPICAL REPLACEMT AORTIC VALVE N/A 06/14/2022    Procedure: REPLACEMENT AORTIC VALVE TRANSCATHETER (TAVR) TRANSAPICAL 29MM IRVIN KYLER S3 ULTRA VALVE(ACCESS ON LEFT) ELANA;  Surgeon: Amarjit Gordon DO;  Location: [facility];  Service: Cardiac Surgery    SKIN CANCER EXCISION      TONSILLECTOMY AND ADENOIDECTOMY      UPPER GASTROINTESTINAL ENDOSCOPY         SOCIAL HISTORY:  Social History     Substance and Sexual Activity   Alcohol Use Not Currently    Comment: rare     Social History     Substance and Sexual Activity   Drug Use No     Social History     Tobacco Use   Smoking Status Every Day    Current packs/day: 1.00    Average packs/day: 0.6 packs/day for 100.0 years (62.5 ttl pk-yrs)    Types: Cigarettes    Passive exposure: Current   Smokeless Tobacco Never   Tobacco Comments    1/2 ppd       FAMILY HISTORY:  Family History   Problem Relation Age of Onset    Heart attack Father     Other Sister         bypass and vlave replacement    Stroke Paternal Uncle     Arrhythmia Neg Hx     Asthma Neg Hx     Clotting disorder Neg Hx     Fainting Neg Hx     Anuerysm Neg Hx     Hypertension Neg Hx         unsure     Hyperlipidemia Neg Hx     Heart failure Neg Hx        MEDICATIONS:    Current Outpatient Medications:     allopurinol (ZYLOPRIM) 300 mg tablet, Take 1 tablet (300 mg total) by mouth daily, Disp: 90 tablet, Rfl: 1    amLODIPine (NORVASC) 10 mg tablet, TAKE 1 TABLET DAILY, Disp: 90 tablet, Rfl: 3    atorvastatin (LIPITOR) 80 mg tablet, TAKE 1 TABLET DAILY AT     BEDTIME, Disp: 90 tablet, Rfl: 3    calcitriol (ROCALTROL) 0.25 mcg capsule, Take 1 capsule (0.25 mcg total) by mouth daily, Disp: 90 capsule, Rfl: 4    clopidogrel (PLAVIX) 75 mg tablet, Take 1 tablet (75 mg total) by mouth daily, Disp: 10 tablet, Rfl: 1    glimepiride (AMARYL) 1 mg tablet, Take 1 tablet (1 mg total) by mouth daily with breakfast, Disp: 90 tablet, Rfl: 1    hydrALAZINE (APRESOLINE) 25 mg tablet, Take 1 tablet (25 mg  "total) by mouth 2 (two) times a day, Disp: 180 tablet, Rfl: 3    lisinopril (ZESTRIL) 5 mg tablet, Take 1 tablet (5 mg total) by mouth daily, Disp: 90 tablet, Rfl: 3    metoprolol succinate (TOPROL-XL) 25 mg 24 hr tablet, Take 0.5 tablets (12.5 mg total) by mouth daily, Disp: 90 tablet, Rfl: 1    pancrelipase, Lip-Prot-Amyl, (CREON) 24,000 units, Take 24,000 units of lipase by mouth 3 (three) times a day with meals, Disp: 360 capsule, Rfl: 1    pantoprazole (PROTONIX) 40 mg tablet, Take 1 tablet (40 mg total) by mouth 2 (two) times a day, Disp: 180 tablet, Rfl: 1    torsemide (DEMADEX) 20 mg tablet, TAKE 0.5 TABLETS (10 MG TOTAL) BY MOUTH DAILY TAKES 10 MG ONCE A DAY., Disp: 90 tablet, Rfl: 3    mupirocin (BACTROBAN) 2 % ointment, Apply topically 3 (three) times a day (Patient not taking: Reported on 3/7/2024), Disp: 22 g, Rfl: 0      PHYSICAL EXAM:  Vitals:    05/14/24 1050   BP: (!) 168/48   BP Location: Left arm   Patient Position: Sitting   Cuff Size: Standard   Pulse: (!) 53   Weight: 80.6 kg (177 lb 9.6 oz)   Height: 5' 10\" (1.778 m)     Body mass index is 25.48 kg/m².    Physical Exam  Constitutional:       General: He is not in acute distress.     Appearance: He is well-developed. He is not diaphoretic.   HENT:      Head: Normocephalic and atraumatic.      Mouth/Throat:      Mouth: Mucous membranes are moist.   Eyes:      General: No scleral icterus.     Conjunctiva/sclera: Conjunctivae normal.      Pupils: Pupils are equal, round, and reactive to light.   Neck:      Thyroid: No thyromegaly.   Cardiovascular:      Rate and Rhythm: Normal rate and regular rhythm.      Heart sounds: Murmur heard.      No friction rub.   Pulmonary:      Effort: Pulmonary effort is normal. No respiratory distress.      Breath sounds: Normal breath sounds. No wheezing or rales.   Abdominal:      General: Bowel sounds are normal. There is no distension.      Palpations: Abdomen is soft.      Tenderness: There is no abdominal " tenderness.   Musculoskeletal:         General: No deformity.      Cervical back: Neck supple.      Right lower leg: No edema.      Left lower leg: No edema.   Lymphadenopathy:      Cervical: No cervical adenopathy.   Skin:     Coloration: Skin is not pale.      Nails: There is no clubbing.   Neurological:      Mental Status: He is alert and oriented to person, place, and time. He is not disoriented.   Psychiatric:         Mood and Affect: Mood normal. Mood is not anxious. Affect is not inappropriate.         Behavior: Behavior normal.         Thought Content: Thought content normal.         Lab Results:   Results for orders placed or performed in visit on 05/10/24   Renal function panel   Result Value Ref Range    Albumin 3.7 3.5 - 5.0 g/dL    Calcium 8.9 8.4 - 10.2 mg/dL    Phosphorus 3.4 2.3 - 4.1 mg/dL    BUN 29 (H) 5 - 25 mg/dL    Creatinine 1.88 (H) 0.60 - 1.30 mg/dL    Sodium 142 135 - 147 mmol/L    Potassium 4.0 3.5 - 5.3 mmol/L    Chloride 107 96 - 108 mmol/L    CO2 26 21 - 32 mmol/L    ANION GAP 9 4 - 13 mmol/L    eGFR 32 ml/min/1.73sq m    Glucose, Fasting 128 (H) 65 - 99 mg/dL   PTH, intact   Result Value Ref Range    .7 (H) 12.0 - 88.0 pg/mL   Magnesium   Result Value Ref Range    Magnesium 2.1 1.9 - 2.7 mg/dL

## 2024-05-14 NOTE — TELEPHONE ENCOUNTER
Patient called in stating the Podiatrist is waiting on the office to send diabetic shoes form back to podiatrist office signed.    Patient cannot get shoes until  the form is signed by PCP and faxed to .     Please call the patient when form is signed and faxed.

## 2024-05-15 PROCEDURE — 77300 RADIATION THERAPY DOSE PLAN: CPT | Performed by: RADIOLOGY

## 2024-05-15 PROCEDURE — 77293 RESPIRATOR MOTION MGMT SIMUL: CPT | Performed by: RADIOLOGY

## 2024-05-15 PROCEDURE — 77301 RADIOTHERAPY DOSE PLAN IMRT: CPT | Performed by: RADIOLOGY

## 2024-05-15 PROCEDURE — 77338 DESIGN MLC DEVICE FOR IMRT: CPT | Performed by: RADIOLOGY

## 2024-05-20 ENCOUNTER — APPOINTMENT (OUTPATIENT)
Dept: RADIATION ONCOLOGY | Facility: CLINIC | Age: 79
End: 2024-05-20
Attending: RADIOLOGY
Payer: MEDICARE

## 2024-05-20 PROCEDURE — 77373 STRTCTC BDY RAD THER TX DLVR: CPT | Performed by: RADIOLOGY

## 2024-05-22 ENCOUNTER — APPOINTMENT (OUTPATIENT)
Dept: RADIATION ONCOLOGY | Facility: CLINIC | Age: 79
End: 2024-05-22
Attending: STUDENT IN AN ORGANIZED HEALTH CARE EDUCATION/TRAINING PROGRAM
Payer: MEDICARE

## 2024-05-22 LAB
CARIS GENOMIC LOH - EXOME: NORMAL
CARIS HER2/NEU: NEGATIVE
CARIS MSI - EXOME: NORMAL
CARIS PD-L1 (22C3): POSITIVE
CARIS PD-L1 (SP263): POSITIVE
CARIS PD-L1 FDA (28-8): POSITIVE
CARIS PD-L1 FDA(SP142): NEGATIVE
CARIS TMB - EXOME: NORMAL

## 2024-05-22 PROCEDURE — 77373 STRTCTC BDY RAD THER TX DLVR: CPT | Performed by: RADIOLOGY

## 2024-05-22 NOTE — ASSESSMENT & PLAN NOTE
Lab Results   Component Value Date    HGBA1C 6.7 (H) 08/31/2023       No results for input(s): "POCGLU" in the last 72 hours. Blood Sugar Average: Last 72 hrs:    Hold p.o. meds. Sliding scale insulin for now  Patient reports that his blood glucose was low yesterday because took his glimepiride and did not eat anything so had to take some glucose tablets. Blood glucose now elevated on admission. no

## 2024-05-24 ENCOUNTER — APPOINTMENT (OUTPATIENT)
Dept: RADIATION ONCOLOGY | Facility: CLINIC | Age: 79
End: 2024-05-24
Attending: RADIOLOGY
Payer: MEDICARE

## 2024-05-24 ENCOUNTER — APPOINTMENT (OUTPATIENT)
Dept: RADIATION ONCOLOGY | Facility: CLINIC | Age: 79
End: 2024-05-24
Payer: MEDICARE

## 2024-05-24 DIAGNOSIS — C34.32 PRIMARY NON-SMALL CELL CARCINOMA OF LOWER LOBE OF LEFT LUNG (HCC): Primary | ICD-10-CM

## 2024-05-24 PROCEDURE — 77373 STRTCTC BDY RAD THER TX DLVR: CPT | Performed by: RADIOLOGY

## 2024-05-28 ENCOUNTER — HOSPITAL ENCOUNTER (OUTPATIENT)
Dept: RADIOLOGY | Facility: MEDICAL CENTER | Age: 79
Discharge: HOME | End: 2024-05-28
Payer: MEDICARE

## 2024-05-28 ENCOUNTER — APPOINTMENT (OUTPATIENT)
Dept: RADIATION ONCOLOGY | Facility: CLINIC | Age: 79
End: 2024-05-28
Attending: RADIOLOGY
Payer: MEDICARE

## 2024-05-28 DIAGNOSIS — I70.213 ATHEROSCLEROSIS OF NATIVE ARTERIES OF EXTREMITIES WITH INTERMITTENT CLAUDICATION, BILATERAL LEGS (HCC): ICD-10-CM

## 2024-05-28 PROCEDURE — 93922 UPR/L XTREMITY ART 2 LEVELS: CPT | Performed by: SURGERY

## 2024-05-28 PROCEDURE — 93925 LOWER EXTREMITY STUDY: CPT

## 2024-05-28 PROCEDURE — 77373 STRTCTC BDY RAD THER TX DLVR: CPT | Performed by: RADIOLOGY

## 2024-05-28 PROCEDURE — 93923 UPR/LXTR ART STDY 3+ LVLS: CPT

## 2024-05-28 PROCEDURE — 93925 LOWER EXTREMITY STUDY: CPT | Performed by: SURGERY

## 2024-05-30 ENCOUNTER — APPOINTMENT (OUTPATIENT)
Dept: RADIATION ONCOLOGY | Facility: CLINIC | Age: 79
End: 2024-05-30
Attending: RADIOLOGY
Payer: MEDICARE

## 2024-05-30 PROCEDURE — 77435 SBRT MANAGEMENT: CPT | Performed by: RADIOLOGY

## 2024-05-30 PROCEDURE — 77336 RADIATION PHYSICS CONSULT: CPT | Performed by: RADIOLOGY

## 2024-06-05 ENCOUNTER — HOSPITAL ENCOUNTER (OUTPATIENT)
Dept: MRI IMAGING | Facility: HOSPITAL | Age: 79
Discharge: HOME | End: 2024-06-05
Attending: INTERNAL MEDICINE
Payer: MEDICARE

## 2024-06-05 DIAGNOSIS — K86.1 OTHER CHRONIC PANCREATITIS (HCC): ICD-10-CM

## 2024-06-05 PROCEDURE — G1004 CDSM NDSC: HCPCS

## 2024-06-05 PROCEDURE — 74181 MRI ABDOMEN W/O CONTRAST: CPT

## 2024-06-07 ENCOUNTER — PATIENT OUTREACH (OUTPATIENT)
Dept: FAMILY MEDICINE CLINIC | Facility: CLINIC | Age: 79
End: 2024-06-07

## 2024-06-07 NOTE — PROGRESS NOTES
"Follow up call with patient. He reports he is doing well. Completing his radiation treatments. He denies any \"side effects\".  Had an MRI yesterday and eager to have results.   He is scheduled to see PCP 6/21/24.  Was seen by nephrology. Reinforced precautions to avoid contrast dye, NSAIDs, fluid restrictions.  Pt requests RN CM continue with outreach calls.   Will transition to surveillance at this time with next outreach in one month.  "

## 2024-06-11 DIAGNOSIS — E11.22 CONTROLLED TYPE 2 DIABETES MELLITUS WITH STAGE 4 CHRONIC KIDNEY DISEASE, WITHOUT LONG-TERM CURRENT USE OF INSULIN (HCC): ICD-10-CM

## 2024-06-11 DIAGNOSIS — N18.4 CONTROLLED TYPE 2 DIABETES MELLITUS WITH STAGE 4 CHRONIC KIDNEY DISEASE, WITHOUT LONG-TERM CURRENT USE OF INSULIN (HCC): ICD-10-CM

## 2024-06-11 NOTE — TELEPHONE ENCOUNTER
Reason for call:   [x] Refill   [] Prior Auth  [] Other:     Office:   [x] PCP/Provider -   [] Specialty/Provider -     Medication: glimepiride (AMARYL) 1 mg tablet     Dose/Frequency:   1 mg, Oral, Daily with breakfast        Quantity: 90    Pharmacy: [facility]     Does the patient have enough for 3 days?   [x] Yes   [] No - Send as HP to POD

## 2024-06-12 RX ORDER — GLIMEPIRIDE 1 MG/1
1 TABLET ORAL
Qty: 90 TABLET | Refills: 1 | Status: SHIPPED | OUTPATIENT
Start: 2024-06-12 | End: 2024-12-09

## 2024-06-18 ENCOUNTER — LAB (OUTPATIENT)
Dept: LAB | Facility: MEDICAL CENTER | Age: 79
End: 2024-06-18
Payer: MEDICARE

## 2024-06-18 DIAGNOSIS — N18.4 CKD (CHRONIC KIDNEY DISEASE) STAGE 4, GFR 15-29 ML/MIN (HCC): ICD-10-CM

## 2024-06-18 LAB
ANION GAP SERPL CALCULATED.3IONS-SCNC: 12 MMOL/L (ref 4–13)
BUN SERPL-MCNC: 28 MG/DL (ref 5–25)
CALCIUM SERPL-MCNC: 9 MG/DL (ref 8.4–10.2)
CHLORIDE SERPL-SCNC: 104 MMOL/L (ref 96–108)
CO2 SERPL-SCNC: 24 MMOL/L (ref 21–32)
CREAT SERPL-MCNC: 2.12 MG/DL (ref 0.6–1.3)
GFR SERPL CREATININE-BSD FRML MDRD: 28 ML/MIN/1.73SQ M
GLUCOSE P FAST SERPL-MCNC: 146 MG/DL (ref 65–99)
POTASSIUM SERPL-SCNC: 4.1 MMOL/L (ref 3.5–5.3)
SODIUM SERPL-SCNC: 140 MMOL/L (ref 135–147)

## 2024-06-18 PROCEDURE — 36415 COLL VENOUS BLD VENIPUNCTURE: CPT

## 2024-06-18 PROCEDURE — 80048 BASIC METABOLIC PNL TOTAL CA: CPT

## 2024-06-18 NOTE — RESULT ENCOUNTER NOTE
Please inform patient that creatinine slightly trended up to 2.12 mg/dL likely due to recent initiation of lisinopril last month.  This is expected with use of lisinopril.  Please order for BMP to be done in 1 month for chronic kidney disease stage 4 to monitor renal function and confirm stability.  Continue same treatment

## 2024-06-19 ENCOUNTER — TELEPHONE (OUTPATIENT)
Age: 79
End: 2024-06-19

## 2024-06-19 ENCOUNTER — TELEPHONE (OUTPATIENT)
Dept: NEPHROLOGY | Facility: CLINIC | Age: 79
End: 2024-06-19

## 2024-06-19 DIAGNOSIS — N18.4 CKD (CHRONIC KIDNEY DISEASE) STAGE 4, GFR 15-29 ML/MIN (HCC): Primary | ICD-10-CM

## 2024-06-19 NOTE — TELEPHONE ENCOUNTER
Lm for Jay, renal function slightly increased due to initiation of lisinopril which was expected until body adjust to medication. Repeat BMP in 1 month order in epic and also mailed out.       ----- Message from Marcel Muñoz MD sent at 6/18/2024  3:44 PM EDT -----  Please inform patient that creatinine slightly trended up to 2.12 mg/dL likely due to recent initiation of lisinopril last month.  This is expected with use of lisinopril.  Please order for BMP to be done in 1 month for chronic kidney disease stage 4 to monitor renal function and confirm stability.  Continue same treatment

## 2024-06-19 NOTE — TELEPHONE ENCOUNTER
Patient called states missed a call, advised patient call was from Nephrologist office and would need to call them. Did advise patient of Dr Hadley message.

## 2024-06-19 NOTE — TELEPHONE ENCOUNTER
Pastora Luu  6/19/2024  8:50 AM EDT Back to Top      Called and notified pt.    Simón Hadley MD  6/18/2024 10:37 PM EDT       Call pt Hemoglobin is higher at 10.6. Great news.    ZHANNA Alvarez  6/18/2024  1:31 PM EDT

## 2024-06-19 NOTE — TELEPHONE ENCOUNTER
Patient called back- I verified his labs with him, he will have the BMP drawn around 7/19, no further actions needed.

## 2024-06-21 ENCOUNTER — OFFICE VISIT (OUTPATIENT)
Dept: FAMILY MEDICINE CLINIC | Facility: CLINIC | Age: 79
End: 2024-06-21
Payer: MEDICARE

## 2024-06-21 VITALS
HEIGHT: 70 IN | TEMPERATURE: 98.2 F | BODY MASS INDEX: 24.91 KG/M2 | WEIGHT: 174 LBS | HEART RATE: 58 BPM | DIASTOLIC BLOOD PRESSURE: 48 MMHG | RESPIRATION RATE: 16 BRPM | OXYGEN SATURATION: 98 % | SYSTOLIC BLOOD PRESSURE: 140 MMHG

## 2024-06-21 DIAGNOSIS — I25.10 CORONARY ARTERY DISEASE INVOLVING NATIVE CORONARY ARTERY OF NATIVE HEART WITHOUT ANGINA PECTORIS: ICD-10-CM

## 2024-06-21 DIAGNOSIS — I10 PRIMARY HYPERTENSION: ICD-10-CM

## 2024-06-21 DIAGNOSIS — K21.9 GASTROESOPHAGEAL REFLUX DISEASE WITHOUT ESOPHAGITIS: ICD-10-CM

## 2024-06-21 DIAGNOSIS — E78.2 MIXED HYPERLIPIDEMIA: ICD-10-CM

## 2024-06-21 DIAGNOSIS — I70.213 ATHEROSCLEROSIS OF NATIVE ARTERIES OF EXTREMITIES WITH INTERMITTENT CLAUDICATION, BILATERAL LEGS (HCC): Chronic | ICD-10-CM

## 2024-06-21 DIAGNOSIS — N17.9 ACUTE RENAL FAILURE SUPERIMPOSED ON STAGE 4 CHRONIC KIDNEY DISEASE, UNSPECIFIED ACUTE RENAL FAILURE TYPE (HCC): Primary | ICD-10-CM

## 2024-06-21 DIAGNOSIS — F17.219 CIGARETTE NICOTINE DEPENDENCE WITH NICOTINE-INDUCED DISORDER: ICD-10-CM

## 2024-06-21 DIAGNOSIS — N18.4 ACUTE RENAL FAILURE SUPERIMPOSED ON STAGE 4 CHRONIC KIDNEY DISEASE, UNSPECIFIED ACUTE RENAL FAILURE TYPE (HCC): Primary | ICD-10-CM

## 2024-06-21 PROCEDURE — G2211 COMPLEX E/M VISIT ADD ON: HCPCS | Performed by: FAMILY MEDICINE

## 2024-06-21 PROCEDURE — 99214 OFFICE O/P EST MOD 30 MIN: CPT | Performed by: FAMILY MEDICINE

## 2024-06-21 NOTE — PROGRESS NOTES
Ambulatory Visit  Name: Jay Roach Jr.      : 1945      MRN: [id number]  Encounter Provider: Simón Hadley MD  Encounter Date: 2024   Encounter department: [facility]    Assessment & Plan   1. Acute renal failure superimposed on stage 4 chronic kidney disease, unspecified acute renal failure type (HCC)  Assessment & Plan:  Lab Results   Component Value Date    EGFR 28 2024    EGFR 32 05/10/2024    EGFR 27 2024    CREATININE 2.12 (H) 2024    CREATININE 1.88 (H) 05/10/2024    CREATININE 2.18 (H) 2024   Pt to avoid NSAIDs and any IV dyes. Patient to follow up eoither with nephrology or  with us for  further  monitoring of  renal function.   2. Atherosclerosis of native arteries of extremities with intermittent claudication, bilateral legs (HCC)  Assessment & Plan:  Patient is stable  and will continue present plan of care and reassess at next routine visit. All questions about this problem from patient were answered today.   3. Coronary artery disease involving native coronary artery of native heart without angina pectoris  Assessment & Plan:  Patient to continue  with current cardiac meds to decrease risk of re stenosis. Patient to follow up with cardiology for scheduled appointments to decrease risk for further cardiac problems with appropriate diagnostic testing to reassess cardiac status. Patient had alll questions about this problem answered today.   4. Cigarette nicotine dependence with nicotine-induced disorder  Assessment & Plan:  Patient encouraged to quit using tobacco for that increases their risk for COPD, lung cancer,stroke, oral cancer and heart disease. If patient does not want to quit they should let me know  when they are interested in quitting. There are numerous options to use to quit and we can discuss them.   5. Gastroesophageal reflux disease without esophagitis  Assessment & Plan:  Patient to continue with present  therapy and decrease caffeine, avoid ETOH and smoking to decrease acid production. Pt should also cease eating prior to bedtime and avoid excessive fluid intake prior to sleep. May use antacids as needed for breakthrough GERD. All pateint questions answered today about this condition.   6. Mixed hyperlipidemia  Assessment & Plan:  Patient  is stable with current medication and we discussed a low fat low cholesterol diet. Weight loss also discussed for this will help lower cholesterol also. Recheck lipids in 6 months.   7. Primary hypertension  Assessment & Plan:  Patient is stable with current anti-hypertensive medicine and continue to follow a low sodium diet and take current medication. All questions about this condition were answered today.       Falls Plan of Care: balance, strength, and gait training instructions were provided. Home safety education provided.       History of Present Illness     79-year-old male here today for checkup on multimedical problems patient with chronic kidney disease stage IV also with peripheral vascular disease coronary disease hypertension hyperlipidemia GERD.  Patient recently had MR done pancreas which did really do with not showing any remarkable lesions at this point.  The radiologist today was somewhat of a limited study.  Patient will have follow-up studies done in the near future.  Patient also had lab work that his hemoglobin is still going up which is an improvement for him.  Patient has hemoglobin again ordered next month and he has further battery of lab work done in the future in September.  Will see him back in 3 months after his lab work is done for follow-up.      Review of Systems   Constitutional:  Negative for activity change, appetite change, chills, fatigue, fever and unexpected weight change.   HENT:  Negative for congestion, ear pain, hearing loss, mouth sores, postnasal drip, sinus pressure, sinus pain, sneezing and sore throat.    Respiratory:  Negative for  apnea, cough, shortness of breath and wheezing.    Cardiovascular:  Negative for chest pain, palpitations and leg swelling.   Gastrointestinal:  Negative for abdominal pain, constipation, diarrhea, nausea and vomiting.   Endocrine: Negative for cold intolerance and heat intolerance.   Genitourinary:  Negative for dysuria, frequency and hematuria.   Musculoskeletal:  Negative for arthralgias, back pain, gait problem, joint swelling and neck pain.   Skin:  Negative for rash.   Neurological:  Negative for dizziness, weakness and numbness.   Hematological:  Does not bruise/bleed easily.   Psychiatric/Behavioral:  Negative for agitation, behavioral problems, confusion, hallucinations and sleep disturbance. The patient is not nervous/anxious.      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 ([facility]) 09/10/2021   • 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 ([facility]) 02/24/2023   • Basal cell carcinoma     right cheek   • CAD (coronary artery disease)    • Carotid stenosis, asymptomatic, bilateral    • Chronic kidney disease    • Colon polyp    • Dependence on respirator (ventilator) status ([facility]) 04/07/2023   • Diabetes ([facility])     type 2, non-insulin dependent   • Diabetes mellitus ([facility])    • GERD (gastroesophageal reflux disease)    • History of nephrolithiasis    • Hyperlipidemia    • Hypertension    • Left foot pain 11/30/2022   • PAD (peripheral artery disease) ([facility])    • Severe aortic stenosis    • Squamous cell skin cancer 11/22/2022    left superior helix     Past Surgical History:   Procedure Laterality Date   • APPENDECTOMY     • CARDIAC CATHETERIZATION N/A 05/05/2022    Procedure: CARDIAC RHC/LHC;  Surgeon: Arvin Sanchez DO;  Location: [facility];  Service: Cardiology   • CARDIAC CATHETERIZATION N/A 05/05/2022    Procedure: Cardiac Coronary Angiogram;  Surgeon: Arvin  DO Laura;  Location: [facility];  Service: Cardiology   • CARDIAC CATHETERIZATION N/A 05/24/2022    Procedure: Cardiac pci;  Surgeon: Damien Jeter MD;  [facility];  Service: Cardiology   • CARDIAC CATHETERIZATION N/A 06/14/2022    Procedure: CARDIAC TAVR;  Surgeon: Nahum Vaz MD;  Location: [facility];  Service: Cardiology   • COLECTOMY     • COLONOSCOPY  2013   • CORONARY ARTERY BYPASS GRAFT  2013    X 2   • FEMORAL ARTERY - POPLITEAL ARTERY BYPASS GRAFT     • HEMORRHOID SURGERY     • IR AORTAGRAM WITH RUN-OFF  11/19/2018   • IR AORTAGRAM WITH RUN-OFF  03/02/2023   • IR BIOPSY LUNG  4/17/2024   • IR LOWER EXTREMITY ANGIOGRAM  03/23/2023   • MOHS SURGERY Left 01/11/2023    SCC left superior helix-Dr Tovar   • MT SLCTV CATHJ 3RD+ ORD SLCTV ABDL PEL/LXTR BRNCH Left 08/12/2016    Procedure: LEFT FEMORAL ARTERIOGRAM; BALLOON ANGIOPLASTY; SFA  AND FEMORAL AT VEIN GRAFT;  Surgeon: Nicholas Urena MD;  Location: [facility];  Service: Vascular   • MT TEAEC W/WO PATCH GRAFT COMMON FEMORAL Right 03/23/2023    Procedure: Common femoral endarterectomy&antegrade intervention of SFA, popliteal artery w/ shockwave;  Surgeon: Eagle Higuera MD;  Location: [facility];  Service: Vascular   • MT TRANSCATHETER TRANSAPICAL REPLACEMT AORTIC VALVE N/A 06/14/2022    Procedure: REPLACEMENT AORTIC VALVE TRANSCATHETER (TAVR) TRANSAPICAL 29MM IRVIN KYLER S3 ULTRA VALVE(ACCESS ON LEFT) ELANA;  Surgeon: Amarjit Gordon DO;  Location: [facility];  Service: Cardiac Surgery   • SKIN CANCER EXCISION     • TONSILLECTOMY AND ADENOIDECTOMY     • UPPER GASTROINTESTINAL ENDOSCOPY       Family History   Problem Relation Age of Onset   • Heart attack Father    • Other Sister         bypass and vlave replacement   • Stroke Paternal Uncle    • Arrhythmia Neg Hx    • Asthma Neg Hx    • Clotting disorder Neg Hx    • Fainting Neg Hx    • Anuerysm Neg Hx    • Hypertension Neg Hx         unsure    • Hyperlipidemia  Neg Hx    • Heart failure Neg Hx      Social History     Tobacco Use   • Smoking status: Every Day     Current packs/day: 1.00     Average packs/day: 0.6 packs/day for 100.0 years (62.5 ttl pk-yrs)     Types: Cigarettes     Passive exposure: Current   • Smokeless tobacco: Never   • Tobacco comments:     1/2 ppd   Vaping Use   • Vaping status: Never Used   Substance and Sexual Activity   • Alcohol use: Not Currently     Comment: rare   • Drug use: No   • Sexual activity: Not Currently     Current Outpatient Medications on File Prior to Visit   Medication Sig   • allopurinol (ZYLOPRIM) 300 mg tablet Take 1 tablet (300 mg total) by mouth daily   • amLODIPine (NORVASC) 10 mg tablet TAKE 1 TABLET DAILY   • atorvastatin (LIPITOR) 80 mg tablet TAKE 1 TABLET DAILY AT     BEDTIME   • calcitriol (ROCALTROL) 0.25 mcg capsule Take 1 capsule (0.25 mcg total) by mouth daily   • clopidogrel (PLAVIX) 75 mg tablet Take 1 tablet (75 mg total) by mouth daily   • glimepiride (AMARYL) 1 mg tablet Take 1 tablet (1 mg total) by mouth daily with breakfast   • hydrALAZINE (APRESOLINE) 25 mg tablet Take 1 tablet (25 mg total) by mouth 2 (two) times a day   • lisinopril (ZESTRIL) 5 mg tablet Take 1 tablet (5 mg total) by mouth daily   • metoprolol succinate (TOPROL-XL) 25 mg 24 hr tablet Take 0.5 tablets (12.5 mg total) by mouth daily   • pancrelipase, Lip-Prot-Amyl, (CREON) 24,000 units Take 24,000 units of lipase by mouth 3 (three) times a day with meals   • pantoprazole (PROTONIX) 40 mg tablet Take 1 tablet (40 mg total) by mouth 2 (two) times a day   • torsemide (DEMADEX) 20 mg tablet TAKE 0.5 TABLETS (10 MG TOTAL) BY MOUTH DAILY TAKES 10 MG ONCE A DAY.   • mupirocin (BACTROBAN) 2 % ointment Apply topically 3 (three) times a day (Patient not taking: Reported on 3/7/2024)     No Known Allergies  Immunization History   Administered Date(s) Administered   • COVID-19 Moderna mRNA Vaccine 12 Yr+ 50 mcg/0.5 mL (Spikevax) 10/04/2023   • COVID-19  "PFIZER VACCINE 0.3 ML IM 03/03/2021, 03/24/2021, 09/30/2021   • COVID-19 Pfizer Vac BIVALENT Stuart-sucrose 12 Yr+ IM 09/29/2022   • H1N1 Inj 11/15/2020   • H1N1, All Formulations 11/15/2020   • INFLUENZA 10/04/2011, 10/19/2012, 10/16/2014, 10/13/2016, 10/06/2018, 11/11/2021, 10/19/2022, 10/19/2023   • Influenza Split High Dose Preservative Free IM 10/06/2018, 11/01/2019   • Pneumococcal Conjugate 13-Valent 12/04/2015   • Pneumococcal Polysaccharide PPV23 06/11/2019   • Respiratory Syncytial Virus Vaccine (Recombinant, Adjuvanted) 10/04/2023   • Tdap 10/15/2021     Objective     BP (!) 140/48 (BP Location: Left arm, Patient Position: Sitting, Cuff Size: Standard)   Pulse 58   Temp 98.2 °F (36.8 °C) (Temporal)   Resp 16   Ht 5' 10\" (1.778 m)   Wt 78.9 kg (174 lb)   SpO2 98%   BMI 24.97 kg/m²     Physical Exam  Constitutional:       Appearance: He is well-developed.   HENT:      Head: Normocephalic and atraumatic.      Right Ear: External ear normal.      Left Ear: External ear normal.      Nose: Nose normal.      Mouth/Throat:      Pharynx: Oropharynx is clear. No oropharyngeal exudate.   Eyes:      General: No scleral icterus.     Conjunctiva/sclera: Conjunctivae normal.      Pupils: Pupils are equal, round, and reactive to light.   Cardiovascular:      Rate and Rhythm: Normal rate and regular rhythm.      Heart sounds: Normal heart sounds.   Pulmonary:      Effort: Pulmonary effort is normal.      Breath sounds: Normal breath sounds. No wheezing or rales.   Abdominal:      General: Bowel sounds are normal.      Palpations: Abdomen is soft.      Tenderness: There is no abdominal tenderness. There is no guarding.   Musculoskeletal:         General: Normal range of motion.      Cervical back: Normal range of motion and neck supple.   Skin:     General: Skin is warm and dry.      Findings: No erythema or rash.   Neurological:      Mental Status: He is alert and oriented to person, place, and time. Mental status is " at baseline.   Psychiatric:         Mood and Affect: Mood normal.         Behavior: Behavior normal.         Thought Content: Thought content normal.         Judgment: Judgment normal.       Administrative Statements

## 2024-06-21 NOTE — ASSESSMENT & PLAN NOTE
Lab Results   Component Value Date    EGFR 28 06/18/2024    EGFR 32 05/10/2024    EGFR 27 04/30/2024    CREATININE 2.12 (H) 06/18/2024    CREATININE 1.88 (H) 05/10/2024    CREATININE 2.18 (H) 04/30/2024   Pt to avoid NSAIDs and any IV dyes. Patient to follow up eoither with nephrology or  with us for  further  monitoring of  renal function.

## 2024-06-26 ENCOUNTER — TELEPHONE (OUTPATIENT)
Age: 79
End: 2024-06-26

## 2024-06-26 NOTE — TELEPHONE ENCOUNTER
Jay is calling and he would like to speak to Carisa regarding paperwork that he needs Carisa to fix so that he can fax it to his insurance company.

## 2024-06-26 NOTE — TELEPHONE ENCOUNTER
Supplemental insurance plan calling.    Need letter stating 5 recent dates of radiation treatments.    There were 5 in May.    [phone]  to the insurance regarding MR# [id number]    [phone]  Attn: Chitra  Claim # [id number]

## 2024-06-27 NOTE — TELEPHONE ENCOUNTER
RAD ONC - Will review with Dr. Herrera to create an Addendum to EOT and contact Mr. Roach when this is complete...KD

## 2024-07-01 NOTE — TELEPHONE ENCOUNTER
RAD ONC EOT faxed this morning as requested to [employer]...KD   O-L Flap Text: The defect edges were debeveled with a #15 scalpel blade.  Given the location of the defect, shape of the defect and the proximity to free margins an O-L flap was deemed most appropriate.  Using a sterile surgical marker, an appropriate advancement flap was drawn incorporating the defect and placing the expected incisions within the relaxed skin tension lines where possible.    The area thus outlined was incised deep to adipose tissue with a #15 scalpel blade.  The skin margins were undermined to an appropriate distance in all directions utilizing iris scissors.

## 2024-07-08 ENCOUNTER — PATIENT OUTREACH (OUTPATIENT)
Dept: FAMILY MEDICINE CLINIC | Facility: CLINIC | Age: 79
End: 2024-07-08

## 2024-07-08 NOTE — PROGRESS NOTES
"Final call to patient. He completed his radiation treatments. Scheduled for CT scan 7/29/24. Denies any side effects from treatments.  Reports he is feeling well.  Planning to go out and \"water his tomatoes this morning\". Discussed extreme heat precautions. Pt aware to go out early and to hydrate well with water. Encouraged to limit exposure time.  Denies further needs for complex care management. Will remove self from care team and resolve surveillance episode. Pt has my contact information if future assistance is needed.   "

## 2024-07-09 DIAGNOSIS — M10.9 GOUT OF LEFT FOOT, UNSPECIFIED CAUSE, UNSPECIFIED CHRONICITY: ICD-10-CM

## 2024-07-10 ENCOUNTER — OFFICE VISIT (OUTPATIENT)
Dept: VASCULAR SURGERY | Facility: CLINIC | Age: 79
End: 2024-07-10
Payer: MEDICARE

## 2024-07-10 VITALS
DIASTOLIC BLOOD PRESSURE: 68 MMHG | HEIGHT: 70 IN | SYSTOLIC BLOOD PRESSURE: 198 MMHG | WEIGHT: 174 LBS | HEART RATE: 54 BPM | BODY MASS INDEX: 24.91 KG/M2

## 2024-07-10 DIAGNOSIS — N18.30 BENIGN HYPERTENSION WITH CHRONIC KIDNEY DISEASE, STAGE III (HCC): ICD-10-CM

## 2024-07-10 DIAGNOSIS — I12.9 BENIGN HYPERTENSION WITH CHRONIC KIDNEY DISEASE, STAGE III (HCC): ICD-10-CM

## 2024-07-10 DIAGNOSIS — I70.213 ATHEROSCLEROSIS OF NATIVE ARTERIES OF EXTREMITIES WITH INTERMITTENT CLAUDICATION, BILATERAL LEGS (HCC): Primary | Chronic | ICD-10-CM

## 2024-07-10 DIAGNOSIS — I65.23 ASYMPTOMATIC BILATERAL CAROTID ARTERY STENOSIS: Chronic | ICD-10-CM

## 2024-07-10 PROCEDURE — 99213 OFFICE O/P EST LOW 20 MIN: CPT | Performed by: SURGERY

## 2024-07-10 RX ORDER — ALLOPURINOL 300 MG/1
300 TABLET ORAL DAILY
Qty: 90 TABLET | Refills: 1 | Status: SHIPPED | OUTPATIENT
Start: 2024-07-10

## 2024-07-10 NOTE — ASSESSMENT & PLAN NOTE
80 y/o HTN, HLD, DM, CAD, CABG, AS, bilateral asymptomatic carotid stenosis, PAD status post left fem to anterior tibial bypass by Dr Urena '15, s/p  Angioplasty to bypass graft stenoses '16, recurrent bypass graft stenosis  Right femoral endarterectomy and extensive shock wave of right SFA in March 2023  CKD 4  Current smoker (cut down a lot)    Ongoing short distance claudication in right leg.  No rest pain.  SAMEER is low   There is more stenosis of the right iliac artery and popliteal.    He is a high risk candidate for intervention due to CKD4 and frail status.    I would recommend procedure only if there are wounds or severe rest pain.    We will recheck in 6 months.

## 2024-07-10 NOTE — PATIENT INSTRUCTIONS
78 y/o HTN, HLD, DM, CAD, CABG, AS, bilateral asymptomatic carotid stenosis, PAD status post left fem to anterior tibial bypass by Dr Urena '15, s/p  Angioplasty to bypass graft stenoses '16, recurrent bypass graft stenosis  Right femoral endarterectomy and extensive shock wave of right SFA in March 2023  CKD 4  Current smoker (cut down a lot) - please try to quit smoking completely     Ongoing short distance claudication in right leg.  No rest pain.  SAMEER is low   There is more stenosis of the right iliac artery and popliteal.     He is a high risk candidate for intervention due to CKD4 and frail status.     I would recommend procedure only if there are wounds or severe rest pain.     We will recheck in 6 months

## 2024-07-10 NOTE — PROGRESS NOTES
Ambulatory Visit  Name: Jay Roach Jr.      : 1945      MRN: [id number]  Encounter Provider: Eagle Higuera MD  Encounter Date: 7/10/2024   Enc[facility] department: [facility]    Assessment & Plan   1. Atherosclerosis of native arteries of extremities with intermittent claudication, bilateral legs (HCC)  Assessment & Plan:  80 y/o HTN, HLD, DM, CAD, CABG, AS, bilateral asymptomatic carotid stenosis, PAD status post left fem to anterior tibial bypass by Dr Urena '15, s/p  Angioplasty to bypass graft stenoses ', recurrent bypass graft stenosis  Right femoral endarterectomy and extensive shock wave of right SFA in 2023  CKD 4  Current smoker (cut down a lot)    Ongoing short distance claudication in right leg.  No rest pain.  SAMEER is low   There is more stenosis of the right iliac artery and popliteal.    He is a high risk candidate for intervention due to CKD4 and frail status.    I would recommend procedure only if there are wounds or severe rest pain.    We will recheck in 6 months.      Orders:  -     VAS ARTERIAL DUPLEX- LOWER LIMB BILATERAL; Future; Expected date: 01/10/2025  2. Benign hypertension with chronic kidney disease, stage III (HCC)  3. Asymptomatic bilateral carotid artery stenosis  -     VAS ARTERIAL DUPLEX- LOWER LIMB BILATERAL; Future; Expected date: 01/10/2025      History of Present Illness     Jay Roach Jr. is a 79 y.o. male who presents for follow up of his right leg pain.  Walks short distance and has to rest. No pain at night when sleeping. No open wounds on the toe.  Recently finished radiation treatment for lung Ca.  Not a candidate for lung resection.    Patient presents to review FREDO w/ h/o LLE intervention.    Review of Systems   Constitutional: Negative.    HENT: Negative.     Eyes: Negative.    Respiratory: Negative.     Cardiovascular: Negative.    Gastrointestinal: Negative.    Endocrine: Negative.    Genitourinary: Negative.   "  Musculoskeletal: Negative.    Skin:  Positive for wound.   Allergic/Immunologic: Negative.    Neurological: Negative.    Hematological: Negative.    Psychiatric/Behavioral: Negative.     I have reviewed the ROS as entered and made changes as necessary.      Objective     BP (!) 198/68 (BP Location: Left arm, Patient Position: Sitting, Cuff Size: Standard)   Pulse (!) 54   Ht 5' 10\" (1.778 m)   Wt 78.9 kg (174 lb)   BMI 24.97 kg/m²     Physical Exam  Vitals and nursing note reviewed.   Constitutional:       Appearance: He is ill-appearing.   HENT:      Head: Normocephalic and atraumatic.   Cardiovascular:      Rate and Rhythm: Normal rate.      Comments: Biphasic AT and PT on right leg  Triphasic AT and PT on left leg.  Skin:     General: Skin is warm.   Neurological:      General: No focal deficit present.      Mental Status: He is alert and oriented to person, place, and time.       Administrative Statements           "

## 2024-07-15 ENCOUNTER — HOSPITAL ENCOUNTER (OUTPATIENT)
Dept: RADIOLOGY | Facility: MEDICAL CENTER | Age: 79
Discharge: HOME | End: 2024-07-15
Payer: MEDICARE

## 2024-07-15 DIAGNOSIS — I65.23 ASYMPTOMATIC BILATERAL CAROTID ARTERY STENOSIS: ICD-10-CM

## 2024-07-15 PROCEDURE — 93880 EXTRACRANIAL BILAT STUDY: CPT | Performed by: SURGERY

## 2024-07-15 PROCEDURE — 93880 EXTRACRANIAL BILAT STUDY: CPT

## 2024-07-16 ENCOUNTER — TRANSCRIBE ORDERS (OUTPATIENT)
Dept: ADMINISTRATIVE | Facility: HOSPITAL | Age: 79
End: 2024-07-16

## 2024-07-16 DIAGNOSIS — I70.213 ATHEROSCLEROSIS OF NATIVE ARTERIES OF EXTREMITIES WITH INTERMITTENT CLAUDICATION, BILATERAL LEGS (HCC): Primary | Chronic | ICD-10-CM

## 2024-07-19 ENCOUNTER — LAB (OUTPATIENT)
Dept: LAB | Facility: MEDICAL CENTER | Age: 79
End: 2024-07-19
Payer: MEDICARE

## 2024-07-19 DIAGNOSIS — N18.4 CKD (CHRONIC KIDNEY DISEASE) STAGE 4, GFR 15-29 ML/MIN (HCC): ICD-10-CM

## 2024-07-19 LAB
ANION GAP SERPL CALCULATED.3IONS-SCNC: 11 MMOL/L (ref 4–13)
BUN SERPL-MCNC: 36 MG/DL (ref 5–25)
CALCIUM SERPL-MCNC: 8.9 MG/DL (ref 8.4–10.2)
CHLORIDE SERPL-SCNC: 104 MMOL/L (ref 96–108)
CO2 SERPL-SCNC: 24 MMOL/L (ref 21–32)
CREAT SERPL-MCNC: 2.17 MG/DL (ref 0.6–1.3)
GFR SERPL CREATININE-BSD FRML MDRD: 27 ML/MIN/1.73SQ M
GLUCOSE P FAST SERPL-MCNC: 167 MG/DL (ref 65–99)
POTASSIUM SERPL-SCNC: 3.9 MMOL/L (ref 3.5–5.3)
SODIUM SERPL-SCNC: 139 MMOL/L (ref 135–147)

## 2024-07-19 PROCEDURE — 80048 BASIC METABOLIC PNL TOTAL CA: CPT

## 2024-07-21 NOTE — RESULT ENCOUNTER NOTE
Please inform patient that renal function is stable at creatinine 2.17 mg/dL, potassium at normal range, continue same treatment.

## 2024-07-22 ENCOUNTER — TELEPHONE (OUTPATIENT)
Dept: NEPHROLOGY | Facility: CLINIC | Age: 79
End: 2024-07-22

## 2024-07-22 NOTE — TELEPHONE ENCOUNTER
----- Message from Marcel Muñoz MD sent at 7/21/2024  1:10 PM EDT -----  Please inform patient that renal function is stable at creatinine 2.17 mg/dL, potassium at normal range, continue same treatment.

## 2024-07-29 ENCOUNTER — HOSPITAL ENCOUNTER (OUTPATIENT)
Dept: RADIOLOGY | Facility: MEDICAL CENTER | Age: 79
Discharge: HOME | End: 2024-07-29
Payer: MEDICARE

## 2024-07-29 DIAGNOSIS — C34.32 PRIMARY NON-SMALL CELL CARCINOMA OF LOWER LOBE OF LEFT LUNG (HCC): ICD-10-CM

## 2024-07-29 PROCEDURE — 71250 CT THORAX DX C-: CPT

## 2024-08-12 ENCOUNTER — OFFICE VISIT (OUTPATIENT)
Dept: RADIATION ONCOLOGY | Facility: CLINIC | Age: 79
End: 2024-08-12
Attending: RADIOLOGY
Payer: MEDICARE

## 2024-08-12 VITALS
OXYGEN SATURATION: 98 % | WEIGHT: 172 LBS | DIASTOLIC BLOOD PRESSURE: 60 MMHG | TEMPERATURE: 97.2 F | SYSTOLIC BLOOD PRESSURE: 140 MMHG | HEART RATE: 64 BPM | RESPIRATION RATE: 16 BRPM | BODY MASS INDEX: 24.68 KG/M2

## 2024-08-12 DIAGNOSIS — C34.32 PRIMARY NON-SMALL CELL CARCINOMA OF LOWER LOBE OF LEFT LUNG (HCC): Primary | ICD-10-CM

## 2024-08-12 PROCEDURE — 99211 OFF/OP EST MAY X REQ PHY/QHP: CPT | Performed by: RADIOLOGY

## 2024-08-12 PROCEDURE — 99024 POSTOP FOLLOW-UP VISIT: CPT | Performed by: RADIOLOGY

## 2024-08-12 NOTE — PROGRESS NOTES
Follow-up - Radiation Oncology   Jay Roach Jr. 1945 79 y.o. 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 is a 79 y.o. year old male with a history of with multiple comorbidities and nonsurgical candidate diagnosed with stage I squamous cell carcinoma of the left lung in April 2024.  He recently completed definitive SBRT on 5/30/24. He presents today for follow up.     7/29/24 CT chest wo contrast  Emphysematous and fibrotic pulmonary disease as discussed above. No acute infiltrates or new mass lesions. Interval decrease in previously described left lower lobe nodule, now more linear and scarlike.     The patient offers no new respiratory complaints.  He denies fever, progressive cough, hemoptysis, worsening dyspnea on exertion or shortness of breath at rest.  He denies chest pain or palpitations.  He feels that his breathing is unchanged pre and post treatment.    His main complaint today is daily headaches for the last 3 weeks.  He states that the headaches are episodic and occur up to a few times a day.  They are localized primarily over the left postauricular region, but sometimes are in the right postauricular region.  He denies vision changes, nausea, vomiting, focal numbness or weakness.  He denies dysarthria, dysphagia, or gait changes.  Pain is mild to moderate.  He denies waking up with headaches or progressive pain.        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 Left lung nodule, biopsy: Non-small cell carcinoma consistent with a squamous cell carcinoma.      5/20/2024 - 5/30/2024 Radiation    Treatments:  Course: C1_SBRT  Plan ID Energy Fractions Dose per Fraction (cGy) Dose Correction (cGy) Total Dose Delivered (cGy) Elapsed Days   SBRT LLL_50Gy 6X-FFF 5 / 5 1,000 0 5,000 10    Treatment Dates:  5/20/2024 - 5/30/2024.          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 ([facility]) 09/10/2021    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 ([facility]) 02/24/2023    Basal cell carcinoma     right cheek    CAD (coronary artery disease)     Carotid stenosis, asymptomatic, bilateral     Chronic kidney disease     Colon polyp     Dependence on respirator (ventilator) status ([facility]) 04/07/2023    Diabetes ([facility])     type 2, non-insulin dependent    Diabetes mellitus ([facility])     GERD (gastroesophageal reflux disease)     History of nephrolithiasis     Hyperlipidemia     Hypertension     Left foot pain 11/30/2022    Lung cancer ([facility])     PAD (peripheral artery disease) ([facility])     Severe aortic stenosis     Squamous cell skin cancer 11/22/2022    left superior helix     Past Surgical History:   Procedure Laterality Date    APPENDECTOMY      CARDIAC CATHETERIZATION N/A 05/05/2022    Procedure: CARDIAC RHC/LHC;  Surgeon: Arvin Sanchez DO;  [facility]: [facility];  Service: Cardiology    CARDIAC CATHETERIZATION N/A 05/05/2022    Procedure: Cardiac Coronary Angiogram;  Surgeon: Arvin Sanchez DO;  [facility]: [facility];  Service: Cardiology    CARDIAC CATHETERIZATION N/A 05/24/2022    Procedure: Cardiac pci;  Surgeon: Damien GARRISON  MD Corona;  [facility]: [facility];  Service: Cardiology    CARDIAC CATHETERIZATION N/A 06/14/2022    Procedure: CARDIAC TAVR;  Surgeon: Nahum Vaz MD;  Location: [facility];  Service: Cardiology    COLECTOMY      COLONOSCOPY  2013    CORONARY ARTERY BYPASS GRAFT  2013    X 2    FEMORAL ARTERY - POPLITEAL ARTERY BYPASS GRAFT      HEMORRHOID SURGERY      IR AORTAGRAM WITH RUN-OFF  11/19/2018    IR AORTAGRAM WITH RUN-OFF  03/02/2023    IR BIOPSY LUNG  4/17/2024    IR LOWER EXTREMITY ANGIOGRAM  03/23/2023    MOHS SURGERY Left 01/11/2023    SCC left superior helix-Dr Guy    IL SLCTV CATHJ 3RD+ ORD SLCTV ABDL PEL/LXTR BRNCH Left 08/12/2016    Procedure: LEFT FEMORAL ARTERIOGRAM; BALLOON ANGIOPLASTY; SFA  AND FEMORAL AT VEIN GRAFT;  Surgeon: Nicholas Urena MD;  Location: [facility];  Service: Vascular    IL TEAEC W/WO PATCH GRAFT COMMON FEMORAL Right 03/23/2023    Procedure: Common femoral endarterectomy&antegrade intervention of SFA, popliteal artery w/ shockwave;  Surgeon: Eagle Higuera MD;  Location: [facility];  Service: Vascular    IL TRANSCATHETER TRANSAPICAL REPLACEMT AORTIC VALVE N/A 06/14/2022    Procedure: REPLACEMENT AORTIC VALVE TRANSCATHETER (TAVR) TRANSAPICAL 29MM IRVIN KYLER S3 ULTRA VALVE(ACCESS ON LEFT) ELANA;  Surgeon: Amarjit Gordon DO;  Location: [facility];  Service: Cardiac Surgery    SKIN CANCER EXCISION      TONSILLECTOMY AND ADENOIDECTOMY      UPPER GASTROINTESTINAL ENDOSCOPY         Social History   Social History     Substance and Sexual Activity   Alcohol Use Not Currently    Comment: rare     Social History     Substance and Sexual Activity   Drug Use No     Social History     Tobacco Use   Smoking Status Every Day    Current packs/day: 1.00    Average packs/day: 0.6 packs/day for 100.0 years (62.5 ttl pk-yrs)    Types: Cigarettes    Passive exposure: Current   Smokeless Tobacco Never   Tobacco Comments    4 cigarettes per day         Meds/Allergies     Current  Outpatient Medications:     allopurinol (ZYLOPRIM) 300 mg tablet, TAKE 1 TABLET DAILY, Disp: 90 tablet, Rfl: 1    amLODIPine (NORVASC) 10 mg tablet, TAKE 1 TABLET DAILY, Disp: 90 tablet, Rfl: 3    atorvastatin (LIPITOR) 80 mg tablet, TAKE 1 TABLET DAILY AT     BEDTIME, Disp: 90 tablet, Rfl: 3    calcitriol (ROCALTROL) 0.25 mcg capsule, Take 1 capsule (0.25 mcg total) by mouth daily, Disp: 90 capsule, Rfl: 4    clopidogrel (PLAVIX) 75 mg tablet, Take 1 tablet (75 mg total) by mouth daily, Disp: 10 tablet, Rfl: 1    glimepiride (AMARYL) 1 mg tablet, Take 1 tablet (1 mg total) by mouth daily with breakfast, Disp: 90 tablet, Rfl: 1    hydrALAZINE (APRESOLINE) 25 mg tablet, Take 1 tablet (25 mg total) by mouth 2 (two) times a day, Disp: 180 tablet, Rfl: 3    lisinopril (ZESTRIL) 5 mg tablet, Take 1 tablet (5 mg total) by mouth daily, Disp: 90 tablet, Rfl: 3    metoprolol succinate (TOPROL-XL) 25 mg 24 hr tablet, Take 0.5 tablets (12.5 mg total) by mouth daily, Disp: 90 tablet, Rfl: 1    pancrelipase, Lip-Prot-Amyl, (CREON) 24,000 units, Take 24,000 units of lipase by mouth 3 (three) times a day with meals, Disp: 360 capsule, Rfl: 1    pantoprazole (PROTONIX) 40 mg tablet, Take 1 tablet (40 mg total) by mouth 2 (two) times a day, Disp: 180 tablet, Rfl: 1    torsemide (DEMADEX) 20 mg tablet, TAKE 0.5 TABLETS (10 MG TOTAL) BY MOUTH DAILY TAKES 10 MG ONCE A DAY., Disp: 90 tablet, Rfl: 3    mupirocin (BACTROBAN) 2 % ointment, Apply topically 3 (three) times a day (Patient not taking: Reported on 3/7/2024), Disp: 22 g, Rfl: 0  No Known Allergies      Review of Systems   Constitutional:  Positive for fatigue.   HENT: Negative.     Eyes:         Wears glasses   Respiratory:  Positive for cough (Daily, productive,  with clear phlegm). Negative for chest tightness and wheezing.    Cardiovascular:  Negative for chest pain.   Gastrointestinal: Negative.    Endocrine: Negative.    Genitourinary:  Positive for frequency.         Nocturia x 2-3   Musculoskeletal:  Positive for neck pain.   Skin: Negative.    Allergic/Immunologic: Negative.    Neurological:  Positive for weakness and headaches.   Hematological:  Bruises/bleeds easily.   Psychiatric/Behavioral: Negative.            OBJECTIVE:   /60   Pulse 64   Temp (!) 97.2 °F (36.2 °C)   Resp 16   Wt 78 kg (172 lb)   SpO2 98%   BMI 24.68 kg/m²   Karnofsky: 80 - Normal activity with effort; some signs or symptoms of disease    Physical Exam  Vitals and nursing note reviewed.   Constitutional:       General: He is not in acute distress.     Appearance: He is well-developed.   Cardiovascular:      Rate and Rhythm: Normal rate and regular rhythm.   Pulmonary:      Breath sounds: No wheezing, rhonchi or rales.   Musculoskeletal:      Right lower leg: No edema.      Left lower leg: No edema.   Lymphadenopathy:      Cervical: No cervical adenopathy.      Upper Body:      Right upper body: No supraclavicular adenopathy.      Left upper body: No supraclavicular adenopathy.   Neurological:      Mental Status: He is alert and oriented to person, place, and time.      Cranial Nerves: Cranial nerves 2-12 are intact.      Sensory: Sensation is intact.      Motor: Motor function is intact.      Gait: Gait normal.      Comments: Speech is fluent and able to follow complex commands without difficulty.  Able to hold conversation without difficulty.            RESULTS  Imaging Studies:CT chest wo contrast    Result Date: 7/30/2024  Narrative: CT CHEST WITHOUT IV CONTRAST INDICATION: C34.32: Malignant neoplasm of lower lobe, left bronchus or lung. COMPARISON: February 28, 2024, October 4, 2022 TECHNIQUE: CT examination of the chest was performed without intravenous contrast. Multiplanar 2D reformatted images were created from the source data. This examination, like all CT scans performed in the [facility], was performed utilizing techniques to minimize radiation dose exposure,  including the use of iterative reconstruction and automated exposure control. Radiation dose length product (DLP) for this visit: 373 mGy-cm FINDINGS: LUNGS: Background moderate centrilobular and paraseptal emphysema and peripheral reticular interstitial thickening and component of honeycomb changes in the lung bases. No significant interval change. No areas of dense consolidative opacities. In regard to the nodule described on the most recent prior in the left lower lobe this does persist but appears more linear and scarlike, currently measuring 11 x 6 mm on image 3/155. Appearance suggests postinflammatory etiology. No new lesions. PLEURA: Unremarkable. HEART/GREAT VESSELS: Heart is unremarkable for patient's age. No thoracic aortic aneurysm. Prior aortic valve repair. Prior CABG. MEDIASTINUM AND MICHAEL: Unremarkable. CHEST WALL AND LOWER NECK: Unremarkable. VISUALIZED STRUCTURES IN THE UPPER ABDOMEN: Unremarkable. OSSEOUS STRUCTURES: No acute fracture or destructive osseous lesion.     Impression: Emphysematous and fibrotic pulmonary disease as discussed above. No acute infiltrates or new mass lesions. Interval decrease in previously described left lower lobe nodule, now more linear and scarlike. Workstation performed: [id number]         Assessment/Plan:  Jay Roach Jr. is a 79 y.o. year old male with a history of with multiple comorbidities and nonsurgical candidate diagnosed with stage I squamous cell carcinoma of the left lung in April 2024.  He recently completed definitive SBRT on 5/30/24.     Lung cancer currently clinically INEZ  -I reviewed imaging results with the patient. There appears to be treatment response and no evidence of progression or recurrence.    -He has no significant toxicity related to radiation at this time  -chest CT in 3-4 months with follow-up afterwards    Headaches  -unlikely related to cancer, but patient has significant comorbidities including cervical stenosis and vascular  "disease.  I offered brain MRI for evaluation versus continued surveillance.  He wished to undergo MRI evaluation.  -schedule imaging and follow-up afterwards.    Lilia Herrera MD  8/12/2024,4:07 PM    Portions of the record may have been created with voice recognition software.  Occasional wrong word or \"sound a like\" substitutions may have occurred due to the inherent limitations of voice recognition software.  Read the chart carefully and recognize, using context, where substitutions have occurred.        "

## 2024-08-12 NOTE — PROGRESS NOTES
Jay Roach Jr. 1945 is a 79 y.o. male with multiple comorbidities and nonsurgical candidate diagnosed with stage I squamous cell carcinoma of the left lung in April 2024.  He recently completed definitive SBRT on 5/30/24. He presents today for follow up.    Treatments were delivered every other day on dates: 5/20, 5/22, 5/24, 5/28, 5/30/2024. The patient tolerated radiation well without significant toxicity.        7/29/24 CT chest wo contrast  Emphysematous and fibrotic pulmonary disease as discussed above. No acute infiltrates or new mass lesions.   Interval decrease in previously described left lower lobe nodule, now more linear and scarlike.    Follow up visit     Oncology History   Primary non-small cell carcinoma of lower lobe of left lung (HCC)   3/21/2024 Initial Diagnosis    Primary non-small cell carcinoma of lower lobe of left lung (HCC)     4/17/2024 -  Cancer Staged    Staging form: Lung, AJCC 8th Edition  - Clinical stage from 4/17/2024: Stage IA2 (cT1b, cN0, cM0) - Signed by Uzma Delgado PA-C on 4/25/2024  Histopathologic type: Squamous cell carcinoma, NOS       4/17/2024 Biopsy    IR lung biopsy    A. Left lung nodule, biopsy: Non-small cell carcinoma consistent with a squamous cell carcinoma.      5/20/2024 - 5/30/2024 Radiation    Treatments:  Course: C1_SBRT  Plan ID Energy Fractions Dose per Fraction (cGy) Dose Correction (cGy) Total Dose Delivered (cGy) Elapsed Days   SBRT LLL_50Gy 6X-FFF 5 / 5 1,000 0 5,000 10    Treatment Dates:  5/20/2024 - 5/30/2024.          Review of Systems:  Review of Systems   Constitutional:  Positive for fatigue.   HENT: Negative.     Eyes:         Wears glasses   Respiratory:  Positive for cough (Daily, productive,  with clear phlegm). Negative for chest tightness and wheezing.    Cardiovascular:  Negative for chest pain.   Gastrointestinal: Negative.    Endocrine: Negative.    Genitourinary:  Positive for frequency.        Nocturia x 2-3    Musculoskeletal:  Positive for neck pain.   Skin: Negative.    Allergic/Immunologic: Negative.    Neurological:  Positive for weakness and headaches.   Hematological:  Bruises/bleeds easily.   Psychiatric/Behavioral: Negative.         Clinical Trial: no        Health Maintenance   Topic Date Due    Zoster Vaccine (1 of 2) Never done    Hepatitis A Vaccine (1 of 2 - Risk 2-dose series) Never done    Medicare Annual Wellness Visit (AWV)  07/27/2023    COVID-19 Vaccine (6 - 2023-24 season) 11/29/2023    Influenza Vaccine (1) 09/01/2024    HEMOGLOBIN A1C  09/05/2024    Fall Risk  11/27/2024    Diabetic Foot Exam  02/21/2025    Depression Screening  05/03/2025    BMI: Adult  07/10/2025    DM Eye Exam  02/01/2026    Hepatitis C Screening  Completed    RSV Vaccine Age 60+ Years  Completed    Pneumococcal Vaccine: 65+ Years  Completed    RSV Vaccine age 0-20 Months  Aged Out    HIB Vaccine  Aged Out    IPV Vaccine  Aged Out    Meningococcal ACWY Vaccine  Aged Out    HPV Vaccine  Aged Out    Lung Cancer Screening  Discontinued    Colorectal Cancer Screening  Discontinued     Patient Active Problem List   Diagnosis    Atherosclerosis of native arteries of extremities with intermittent claudication, bilateral legs ([facility])    Stenosis of noncoronary bypass graft ([facility])    Asymptomatic bilateral carotid artery stenosis    S/P CABG (coronary artery bypass graft)    Hypertension    Renal artery stenosis, native, bilateral ([facility])    CAD (coronary artery disease)    Progressive angina ([facility])    Tension headache    Cigarette nicotine dependence with nicotine-induced disorder    Calcific tendinitis of right shoulder region    Right shoulder pain    Sinus bradycardia    Type 2 diabetes mellitus with stage 4 chronic kidney disease, without long-term current use of insulin ([facility])    GERD (gastroesophageal reflux disease)    Hyperlipidemia    Persistent proteinuria, unspecified    CKD (chronic kidney disease) stage 4, GFR 15-29 ml/min ([facility])     Leukopenia    Hypomagnesemia    Renal cyst, left    Acute kidney injury superimposed on chronic kidney disease  (HCC)    Iron deficiency anemia    Chronic diastolic CHF (congestive heart failure) (HCC)    Elevated serum creatinine    S/P TAVR (transcatheter aortic valve replacement)    Benign hypertension with chronic kidney disease, stage III (HCC)    Secondary hyperparathyroidism of renal origin (HCC)    Anemia due to stage 4 chronic kidney disease  (HCC)    Hyperuricemia    Platelets decreased (HCC)    Vitamin D deficiency    GI bleed    Acute blood loss anemia    Acute kidney injury superimposed on stage 4 chronic kidney disease (HCC)    Melena    Chronic pancreatitis (HCC)    Peptic ulcer    History of GI bleed    Anemia    Primary non-small cell carcinoma of lower lobe of left lung (HCC)    Post-procedural respiratory complication    Benign hypertension with CKD (chronic kidney disease) stage IV (HCC)     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 (HCC) 02/24/2023    Basal cell carcinoma     right cheek    CAD (coronary artery disease)     Carotid stenosis, asymptomatic, bilateral     Chronic kidney disease     Colon polyp     Dependence on respirator (ventilator) status (HCC) 04/07/2023    Diabetes (HCC)     type 2, non-insulin dependent    Diabetes mellitus (HCC)     GERD (gastroesophageal reflux disease)     History of nephrolithiasis     Hyperlipidemia     Hypertension     Left foot pain 11/30/2022    PAD (peripheral artery disease) (HCC)     Severe aortic stenosis     Squamous cell skin cancer 11/22/2022    left superior helix     Past Surgical History:   Procedure Laterality Date    APPENDECTOMY      CARDIAC CATHETERIZATION N/A 05/05/2022    Procedure: CARDIAC RHC/LHC;  Surgeon: Arvin Sanchez DO;  Location:  [facility];  Service: Cardiology    CARDIAC CATHETERIZATION N/A 05/05/2022    Procedure: Cardiac Coronary Angiogram;  Surgeon: Arvin Sanchez DO;  Location: [facility];  Service: Cardiology    CARDIAC CATHETERIZATION N/A 05/24/2022    Procedure: Cardiac pci;  Surgeon: Damien Jeter MD;  Location: [facility];  Service: Cardiology    CARDIAC CATHETERIZATION N/A 06/14/2022    Procedure: CARDIAC TAVR;  Surgeon: Nahum Vaz MD;  Location: [facility];  Service: Cardiology    COLECTOMY      COLONOSCOPY  2013    CORONARY ARTERY BYPASS GRAFT  2013    X 2    FEMORAL ARTERY - POPLITEAL ARTERY BYPASS GRAFT      HEMORRHOID SURGERY      IR AORTAGRAM WITH RUN-OFF  11/19/2018    IR AORTAGRAM WITH RUN-OFF  03/02/2023    IR BIOPSY LUNG  4/17/2024    IR LOWER EXTREMITY ANGIOGRAM  03/23/2023    MOHS SURGERY Left 01/11/2023    SCC left superior helix-Dr Guy    AK SLCTV CATHJ 3RD+ ORD SLCTV ABDL PEL/LXTR BRNCH Left 08/12/2016    Procedure: LEFT FEMORAL ARTERIOGRAM; BALLOON ANGIOPLASTY; SFA  AND FEMORAL AT VEIN GRAFT;  Surgeon: Nicholas Urnea MD;  Location: [facility];  Service: Vascular    AK TEAEC W/WO PATCH GRAFT COMMON FEMORAL Right 03/23/2023    Procedure: Common femoral endarterectomy&antegrade intervention of SFA, popliteal artery w/ shockwave;  Surgeon: Eagle Higuera MD;  Location: [facility];  Service: Vascular    AK TRANSCATHETER TRANSAPICAL REPLACEMT AORTIC VALVE N/A 06/14/2022    Procedure: REPLACEMENT AORTIC VALVE TRANSCATHETER (TAVR) TRANSAPICAL 29MM IRVIN KYLER S3 ULTRA VALVE(ACCESS ON LEFT) ELANA;  Surgeon: Amarjit Gordon DO;  Location: [facility];  Service: Cardiac Surgery    SKIN CANCER EXCISION      TONSILLECTOMY AND ADENOIDECTOMY      UPPER GASTROINTESTINAL ENDOSCOPY       Family History   Problem Relation Age of Onset    Heart attack Father     Other Sister         bypass and vlave replacement    Stroke Paternal Uncle     Arrhythmia Neg Hx     Asthma Neg Hx      Clotting disorder Neg Hx     Fainting Neg Hx     Anuerysm Neg Hx     Hypertension Neg Hx         unsure     Hyperlipidemia Neg Hx     Heart failure Neg Hx      Social History     Socioeconomic History    Marital status:      Spouse name: Not on file    Number of children: Not on file    Years of education: Not on file    Highest education level: Not on file   Occupational History    Not on file   Tobacco Use    Smoking status: Every Day     Current packs/day: 1.00     Average packs/day: 0.6 packs/day for 100.0 years (62.5 ttl pk-yrs)     Types: Cigarettes     Passive exposure: Current    Smokeless tobacco: Never    Tobacco comments:     /2 ppd   Vaping Use    Vaping status: Never Used   Substance and Sexual Activity    Alcohol use: Not Currently     Comment: rare    Drug use: No    Sexual activity: Not Currently   Other Topics Concern    Not on file   Social History Narrative    · Most recent tobacco use screenin2018      · Do you currently or have you served in the [employer]:   Yes      · If Yes, What branch of service:   [employer]      · Occupation:   Dentists      · Exercise level:   Occasional        · Caffeine intake:   Heavy      · Marital status:         · Diet:   Regular  low fat     · Seat belts used routinely:   Yes      · Smoke alarm in home:   Yes      · Advance directive:   Yes      · General stress level:   Low      Social Determinants of Health     Financial Resource Strain: Low Risk  (2023)    Overall Financial Resource Strain (CARDIA)     Difficulty of Paying Living Expenses: Not very hard   Food Insecurity: No Food Insecurity (2024)    Hunger Vital Sign     Worried About Running Out of Food in the Last Year: Never true     Ran Out of Food in the Last Year: Never true   Transportation Needs: No Transportation Needs (2024)    PRAPARE - Transportation     Lack of Transportation (Medical): No     Lack of Transportation (Non-Medical): No   Physical Activity:  Not on file   Stress: Not on file   Social Connections: Not on file   Intimate Partner Violence: Not on file   Housing Stability: Low Risk  (4/17/2024)    Housing Stability Vital Sign     Unable to Pay for Housing in the Last Year: No     Number of Times Moved in the Last Year: 1     Homeless in the Last Year: No       Current Outpatient Medications:     allopurinol (ZYLOPRIM) 300 mg tablet, TAKE 1 TABLET DAILY, Disp: 90 tablet, Rfl: 1    amLODIPine (NORVASC) 10 mg tablet, TAKE 1 TABLET DAILY, Disp: 90 tablet, Rfl: 3    atorvastatin (LIPITOR) 80 mg tablet, TAKE 1 TABLET DAILY AT     BEDTIME, Disp: 90 tablet, Rfl: 3    calcitriol (ROCALTROL) 0.25 mcg capsule, Take 1 capsule (0.25 mcg total) by mouth daily, Disp: 90 capsule, Rfl: 4    clopidogrel (PLAVIX) 75 mg tablet, Take 1 tablet (75 mg total) by mouth daily, Disp: 10 tablet, Rfl: 1    glimepiride (AMARYL) 1 mg tablet, Take 1 tablet (1 mg total) by mouth daily with breakfast, Disp: 90 tablet, Rfl: 1    hydrALAZINE (APRESOLINE) 25 mg tablet, Take 1 tablet (25 mg total) by mouth 2 (two) times a day, Disp: 180 tablet, Rfl: 3    lisinopril (ZESTRIL) 5 mg tablet, Take 1 tablet (5 mg total) by mouth daily, Disp: 90 tablet, Rfl: 3    metoprolol succinate (TOPROL-XL) 25 mg 24 hr tablet, Take 0.5 tablets (12.5 mg total) by mouth daily, Disp: 90 tablet, Rfl: 1    mupirocin (BACTROBAN) 2 % ointment, Apply topically 3 (three) times a day (Patient not taking: Reported on 3/7/2024), Disp: 22 g, Rfl: 0    pancrelipase, Lip-Prot-Amyl, (CREON) 24,000 units, Take 24,000 units of lipase by mouth 3 (three) times a day with meals, Disp: 360 capsule, Rfl: 1    pantoprazole (PROTONIX) 40 mg tablet, Take 1 tablet (40 mg total) by mouth 2 (two) times a day, Disp: 180 tablet, Rfl: 1    torsemide (DEMADEX) 20 mg tablet, TAKE 0.5 TABLETS (10 MG TOTAL) BY MOUTH DAILY TAKES 10 MG ONCE A DAY., Disp: 90 tablet, Rfl: 3  No Known Allergies  There were no vitals filed for this visit.

## 2024-08-13 DIAGNOSIS — C34.32 PRIMARY NON-SMALL CELL CARCINOMA OF LOWER LOBE OF LEFT LUNG (HCC): Primary | ICD-10-CM

## 2024-08-26 DIAGNOSIS — I25.10 CORONARY ARTERY DISEASE INVOLVING NATIVE HEART WITHOUT ANGINA PECTORIS, UNSPECIFIED VESSEL OR LESION TYPE: ICD-10-CM

## 2024-08-27 RX ORDER — CLOPIDOGREL BISULFATE 75 MG/1
75 TABLET ORAL DAILY
Qty: 90 TABLET | Refills: 0 | Status: SHIPPED | OUTPATIENT
Start: 2024-08-27

## 2024-08-31 ENCOUNTER — HOSPITAL ENCOUNTER (OUTPATIENT)
Dept: MRI IMAGING | Facility: HOSPITAL | Age: 79
Discharge: HOME | End: 2024-08-31
Attending: RADIOLOGY

## 2024-08-31 DIAGNOSIS — C34.32 PRIMARY NON-SMALL CELL CARCINOMA OF LOWER LOBE OF LEFT LUNG (HCC): ICD-10-CM

## 2024-09-03 NOTE — PROGRESS NOTES
Ambulatory Visit  Name: Jay Roach Jr.      : 1945      MRN: [id number]  Encounter Provider: Simón Hadley MD  Encounter Date: 2024   Encounter department: [facility]    Assessment & Plan   1. Well adult exam  2. Type 2 diabetes mellitus with stage 4 chronic kidney disease, without long-term current use of insulin (HCC)  Assessment & Plan:  Patient is stable with current meds and discussed a low carb diet. Pt  recommended to see eye doctor and foot doctor for routine screening as per protocol. Recheck A1C  and Cr in 3 months. Patient questions answered today about this condtion.   Lab Results   Component Value Date    HGBA1C 6.6 (H) 2024     Orders:  -     POCT hemoglobin A1c  3. S/P TAVR (transcatheter aortic valve replacement)  Assessment & Plan:  Patient is stable  and will continue present plan of care and reassess at next routine visit. All questions about this problem from patient were answered today.   4. S/P CABG (coronary artery bypass graft)  Assessment & Plan:  Patient to continue  with current cardiac meds to decrease risk of re stenosis. Patient to follow up with cardiology for scheduled appointments to decrease risk for further cardiac problems with appropriate diagnostic testing to reassess cardiac status. Patient had alll questions about this problem answered today.   5. Primary non-small cell carcinoma of lower lobe of left lung (HCC)  Assessment & Plan:  Patient is stable  and will continue present plan of care and reassess at next routine visit. All questions about this problem from patient were answered today.   6. Primary hypertension  Assessment & Plan:  Patient is stable with current anti-hypertensive medicine and continue to follow a low sodium diet and take current medication. All questions about this condition were answered today.   7. Mixed hyperlipidemia  Assessment & Plan:  Patient  is stable with current medication and we  discussed a low fat low cholesterol diet. Weight loss also discussed for this will help lower cholesterol also. Recheck lipids in 6 months.   8. Gastroesophageal reflux disease without esophagitis  Assessment & Plan:  Patient to continue with present therapy and decrease caffeine, avoid ETOH and smoking to decrease acid production. Pt should also cease eating prior to bedtime and avoid excessive fluid intake prior to sleep. May use antacids as needed for breakthrough GERD. All pateint questions answered today about this condition.   9. Atherosclerosis of native arteries of extremities with intermittent claudication, bilateral legs (HCC)  Assessment & Plan:  Patient is stable  and will continue present plan of care and reassess at next routine visit. All questions about this problem from patient were answered today.       Falls Plan of Care: balance, strength, and gait training instructions were provided. Home safety education provided.       History of Present Illness     79-year-old male here today for Medicare wellness as well as checkup on multimedical problems patient with type 2 diabetes coronary artery disease status post TAVR lung CA hypertension hyperlipidemia peripheral vascular disease with multiple surgeries chronic kidney disease and is stable.  Patient today had lab work done and that is still pending also will see about checking his A1c for his diabetic control but sounds as though this patient is doing fairly well with his diabetes.  Patient also has complained today about having some flashing lights that he sees with his eyes patient does see his eye doctor he saw him back back in February approximately about 7 months ago discussed with patient about possibly giving his eye doctor call and get his retinal evaluation checked out ASAP.  Cannot rule out any kind of retinal problem causing this patient thought he was having some issues with hypoglycemia although I told him I do not know if the sugar  problems causing him to have flashing lights but we really got her worried about a retinal problem or retinal pathology causing this issue.  Will see patient back in approximately 3 months if he is having any issues with the eyes he is seeing a good [place] optometrist who will refer us to get him to see ophthalmologist for correct repair if he has any retinal damage.      Review of Systems   Constitutional:  Negative for activity change, appetite change, chills, fatigue, fever and unexpected weight change.   HENT:  Negative for congestion, ear pain, hearing loss, mouth sores, postnasal drip, sinus pressure, sinus pain, sneezing and sore throat.    Eyes:  Positive for visual disturbance.   Respiratory:  Negative for apnea, cough, shortness of breath and wheezing.    Cardiovascular:  Negative for chest pain, palpitations and leg swelling.   Gastrointestinal:  Negative for abdominal pain, constipation, diarrhea, nausea and vomiting.   Endocrine: Negative for cold intolerance and heat intolerance.   Genitourinary:  Negative for dysuria, frequency and hematuria.   Musculoskeletal:  Negative for arthralgias, back pain, gait problem, joint swelling and neck pain.   Skin:  Negative for rash.   Neurological:  Negative for dizziness, weakness and numbness.   Hematological:  Does not bruise/bleed easily.   Psychiatric/Behavioral:  Negative for agitation, behavioral problems, confusion, hallucinations and sleep disturbance. The patient is not nervous/anxious.      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 ([facility]) 09/10/2021   • 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 ([facility]) 02/24/2023   • Basal cell carcinoma     right cheek   • CAD (coronary artery disease)    • Carotid stenosis, asymptomatic, bilateral    • Chronic kidney disease    • Colon polyp    • Dependence on respirator (ventilator) status  (HCC) 04/07/2023   • Diabetes (HCC)     type 2, non-insulin dependent   • Diabetes mellitus (HCC)    • GERD (gastroesophageal reflux disease)    • History of nephrolithiasis    • Hyperlipidemia    • Hypertension    • Left foot pain 11/30/2022   • Lung cancer (HCC)    • PAD (peripheral artery disease) (HCC)    • Severe aortic stenosis    • Squamous cell skin cancer 11/22/2022    left superior helix     Past Surgical History:   Procedure Laterality Date   • APPENDECTOMY     • CARDIAC CATHETERIZATION N/A 05/05/2022    Procedure: CARDIAC RHC/LHC;  Surgeon: Arvin Sanchez DO;  Location: [facility];  Service: Cardiology   • CARDIAC CATHETERIZATION N/A 05/05/2022    Procedure: Cardiac Coronary Angiogram;  Surgeon: Arvin Sanchez DO;  [facility];  Service: Cardiology   • CARDIAC CATHETERIZATION N/A 05/24/2022    Procedure: Cardiac pci;  Surgeon: Damien Jeter MD;  [facility];  Service: Cardiology   • CARDIAC CATHETERIZATION N/A 06/14/2022    Procedure: CARDIAC TAVR;  Surgeon: Nahum Vaz MD;  Location: [facility];  Service: Cardiology   • COLECTOMY     • COLONOSCOPY  2013   • CORONARY ARTERY BYPASS GRAFT  2013    X 2   • FEMORAL ARTERY - POPLITEAL ARTERY BYPASS GRAFT     • HEMORRHOID SURGERY     • IR AORTAGRAM WITH RUN-OFF  11/19/2018   • IR AORTAGRAM WITH RUN-OFF  03/02/2023   • IR BIOPSY LUNG  4/17/2024   • IR LOWER EXTREMITY ANGIOGRAM  03/23/2023   • MOHS SURGERY Left 01/11/2023    SCC left superior helix-Dr Tovar   • OR SLCTV CATHJ 3RD+ ORD SLCTV ABDL PEL/LXTR BRNCH Left 08/12/2016    Procedure: LEFT FEMORAL ARTERIOGRAM; BALLOON ANGIOPLASTY; SFA  AND FEMORAL AT VEIN GRAFT;  Surgeon: Nicholas Urena MD;  Location: [facility];  Service: Vascular   • OR TEAEC W/WO PATCH GRAFT COMMON FEMORAL Right 03/23/2023    Procedure: Common femoral endarterectomy&antegrade intervention of SFA, popliteal artery w/ shockwave;  Surgeon: Eagle Higuera MD;  Location: [facility]  Main [facility];  Service: Vascular   • CT TRANSCATHETER TRANSAPICAL REPLACEMT AORTIC VALVE N/A 06/14/2022    Procedure: REPLACEMENT AORTIC VALVE TRANSCATHETER (TAVR) TRANSAPICAL 29MM IRVIN KYLER S3 ULTRA VALVE(ACCESS ON LEFT) ELANA;  Surgeon: Amarjit Gordon DO;  Location: [facility];  Service: Cardiac Surgery   • SKIN CANCER EXCISION     • TONSILLECTOMY AND ADENOIDECTOMY     • UPPER GASTROINTESTINAL ENDOSCOPY       Family History   Problem Relation Age of Onset   • Heart attack Father    • Other Sister         bypass and vlave replacement   • Stroke Paternal Uncle    • Arrhythmia Neg Hx    • Asthma Neg Hx    • Clotting disorder Neg Hx    • Fainting Neg Hx    • Anuerysm Neg Hx    • Hypertension Neg Hx         unsure    • Hyperlipidemia Neg Hx    • Heart failure Neg Hx      Social History     Tobacco Use   • Smoking status: Every Day     Current packs/day: 1.00     Average packs/day: 0.6 packs/day for 100.0 years (62.5 ttl pk-yrs)     Types: Cigarettes     Passive exposure: Current   • Smokeless tobacco: Never   • Tobacco comments:     4 cigarettes per day   Vaping Use   • Vaping status: Never Used   Substance and Sexual Activity   • Alcohol use: Not Currently     Comment: rare   • Drug use: No   • Sexual activity: Not Currently     Current Outpatient Medications on File Prior to Visit   Medication Sig   • allopurinol (ZYLOPRIM) 300 mg tablet TAKE 1 TABLET DAILY   • amLODIPine (NORVASC) 10 mg tablet TAKE 1 TABLET DAILY   • atorvastatin (LIPITOR) 80 mg tablet TAKE 1 TABLET DAILY AT     BEDTIME   • calcitriol (ROCALTROL) 0.25 mcg capsule Take 1 capsule (0.25 mcg total) by mouth daily   • clopidogrel (PLAVIX) 75 mg tablet Take 1 tablet (75 mg total) by mouth daily   • glimepiride (AMARYL) 1 mg tablet Take 1 tablet (1 mg total) by mouth daily with breakfast   • hydrALAZINE (APRESOLINE) 25 mg tablet Take 1 tablet (25 mg total) by mouth 2 (two) times a day   • lisinopril (ZESTRIL) 5 mg tablet Take 1 tablet (5 mg total) by  "mouth daily   • metoprolol succinate (TOPROL-XL) 25 mg 24 hr tablet Take 0.5 tablets (12.5 mg total) by mouth daily   • pancrelipase, Lip-Prot-Amyl, (CREON) 24,000 units Take 24,000 units of lipase by mouth 3 (three) times a day with meals   • pantoprazole (PROTONIX) 40 mg tablet Take 1 tablet (40 mg total) by mouth 2 (two) times a day   • torsemide (DEMADEX) 20 mg tablet TAKE 0.5 TABLETS (10 MG TOTAL) BY MOUTH DAILY TAKES 10 MG ONCE A DAY.   • mupirocin (BACTROBAN) 2 % ointment Apply topically 3 (three) times a day (Patient not taking: Reported on 3/7/2024)     No Known Allergies  Immunization History   Administered Date(s) Administered   • COVID-19 Moderna mRNA Vaccine 12 Yr+ 50 mcg/0.5 mL (Spikevax) 10/04/2023   • COVID-19 PFIZER VACCINE 0.3 ML IM 03/03/2021, 03/24/2021, 09/30/2021   • COVID-19 Pfizer Vac BIVALENT Stuart-sucrose 12 Yr+ IM 09/29/2022   • H1N1 Inj 11/15/2020   • H1N1, All Formulations 11/15/2020   • INFLUENZA 10/04/2011, 10/19/2012, 10/16/2014, 10/13/2016, 10/06/2018, 11/11/2021, 10/19/2022, 10/19/2023   • Influenza Split High Dose Preservative Free IM 10/06/2018, 11/01/2019   • Pneumococcal Conjugate 13-Valent 12/04/2015   • Pneumococcal Polysaccharide PPV23 06/11/2019   • Respiratory Syncytial Virus Vaccine (Recombinant, Adjuvanted) 10/04/2023   • Tdap 10/15/2021     Objective     /58 (BP Location: Left arm, Patient Position: Sitting, Cuff Size: Standard)   Pulse 57   Temp 97.9 °F (36.6 °C) (Skin)   Ht 5' 10\" (1.778 m)   Wt 78.9 kg (174 lb)   SpO2 98%   BMI 24.97 kg/m²     Physical Exam  Constitutional:       Appearance: He is well-developed.   HENT:      Head: Normocephalic and atraumatic.      Right Ear: External ear normal.      Left Ear: External ear normal.      Nose: Nose normal.      Mouth/Throat:      Pharynx: Oropharynx is clear. No oropharyngeal exudate.   Eyes:      General: No scleral icterus.     Conjunctiva/sclera: Conjunctivae normal.      Pupils: Pupils are equal, round, " and reactive to light.   Cardiovascular:      Rate and Rhythm: Normal rate and regular rhythm.      Heart sounds: Normal heart sounds.   Pulmonary:      Effort: Pulmonary effort is normal.      Breath sounds: Normal breath sounds. No wheezing or rales.   Abdominal:      General: Bowel sounds are normal.      Palpations: Abdomen is soft.      Tenderness: There is no abdominal tenderness. There is no guarding.   Musculoskeletal:         General: Normal range of motion.      Cervical back: Normal range of motion and neck supple.   Skin:     General: Skin is warm and dry.      Findings: No erythema or rash.   Neurological:      Mental Status: He is alert and oriented to person, place, and time. Mental status is at baseline.   Psychiatric:         Mood and Affect: Mood normal.         Behavior: Behavior normal.         Thought Content: Thought content normal.         Judgment: Judgment normal.

## 2024-09-04 ENCOUNTER — OFFICE VISIT (OUTPATIENT)
Dept: FAMILY MEDICINE CLINIC | Facility: CLINIC | Age: 79
End: 2024-09-04
Payer: MEDICARE

## 2024-09-04 ENCOUNTER — LAB (OUTPATIENT)
Dept: LAB | Facility: MEDICAL CENTER | Age: 79
End: 2024-09-04
Payer: MEDICARE

## 2024-09-04 ENCOUNTER — TELEPHONE (OUTPATIENT)
Age: 79
End: 2024-09-04

## 2024-09-04 VITALS
BODY MASS INDEX: 24.91 KG/M2 | OXYGEN SATURATION: 98 % | TEMPERATURE: 97.9 F | WEIGHT: 174 LBS | DIASTOLIC BLOOD PRESSURE: 58 MMHG | HEART RATE: 57 BPM | SYSTOLIC BLOOD PRESSURE: 122 MMHG | HEIGHT: 70 IN

## 2024-09-04 DIAGNOSIS — I12.9 BENIGN HYPERTENSION WITH CKD (CHRONIC KIDNEY DISEASE) STAGE IV (HCC): ICD-10-CM

## 2024-09-04 DIAGNOSIS — N18.4 CONTROLLED TYPE 2 DIABETES MELLITUS WITH STAGE 4 CHRONIC KIDNEY DISEASE, WITHOUT LONG-TERM CURRENT USE OF INSULIN (HCC): ICD-10-CM

## 2024-09-04 DIAGNOSIS — D50.9 IRON DEFICIENCY ANEMIA, UNSPECIFIED IRON DEFICIENCY ANEMIA TYPE: ICD-10-CM

## 2024-09-04 DIAGNOSIS — C34.32 PRIMARY NON-SMALL CELL CARCINOMA OF LOWER LOBE OF LEFT LUNG (HCC): ICD-10-CM

## 2024-09-04 DIAGNOSIS — E11.22 CONTROLLED TYPE 2 DIABETES MELLITUS WITH STAGE 4 CHRONIC KIDNEY DISEASE, WITHOUT LONG-TERM CURRENT USE OF INSULIN (HCC): ICD-10-CM

## 2024-09-04 DIAGNOSIS — E11.22 TYPE 2 DIABETES MELLITUS WITH STAGE 4 CHRONIC KIDNEY DISEASE, WITHOUT LONG-TERM CURRENT USE OF INSULIN (HCC): ICD-10-CM

## 2024-09-04 DIAGNOSIS — N18.4 BENIGN HYPERTENSION WITH CKD (CHRONIC KIDNEY DISEASE) STAGE IV (HCC): ICD-10-CM

## 2024-09-04 DIAGNOSIS — N25.81 SECONDARY HYPERPARATHYROIDISM OF RENAL ORIGIN (HCC): ICD-10-CM

## 2024-09-04 DIAGNOSIS — I70.213 ATHEROSCLEROSIS OF NATIVE ARTERIES OF EXTREMITIES WITH INTERMITTENT CLAUDICATION, BILATERAL LEGS (HCC): Chronic | ICD-10-CM

## 2024-09-04 DIAGNOSIS — N18.4 TYPE 2 DIABETES MELLITUS WITH STAGE 4 CHRONIC KIDNEY DISEASE, WITHOUT LONG-TERM CURRENT USE OF INSULIN (HCC): ICD-10-CM

## 2024-09-04 DIAGNOSIS — K21.9 GASTROESOPHAGEAL REFLUX DISEASE WITHOUT ESOPHAGITIS: ICD-10-CM

## 2024-09-04 DIAGNOSIS — Z95.2 S/P TAVR (TRANSCATHETER AORTIC VALVE REPLACEMENT): ICD-10-CM

## 2024-09-04 DIAGNOSIS — N18.4 CKD (CHRONIC KIDNEY DISEASE) STAGE 4, GFR 15-29 ML/MIN (HCC): ICD-10-CM

## 2024-09-04 DIAGNOSIS — I50.32 CHRONIC DIASTOLIC CHF (CONGESTIVE HEART FAILURE) (HCC): ICD-10-CM

## 2024-09-04 DIAGNOSIS — Z95.1 S/P CABG (CORONARY ARTERY BYPASS GRAFT): Chronic | ICD-10-CM

## 2024-09-04 DIAGNOSIS — Z00.00 WELL ADULT EXAM: Primary | ICD-10-CM

## 2024-09-04 DIAGNOSIS — I10 PRIMARY HYPERTENSION: ICD-10-CM

## 2024-09-04 DIAGNOSIS — E78.2 MIXED HYPERLIPIDEMIA: ICD-10-CM

## 2024-09-04 DIAGNOSIS — R80.1 PERSISTENT PROTEINURIA, UNSPECIFIED: ICD-10-CM

## 2024-09-04 LAB
25(OH)D3 SERPL-MCNC: 32.8 NG/ML (ref 30–100)
ANION GAP SERPL CALCULATED.3IONS-SCNC: 11 MMOL/L (ref 4–13)
BACTERIA UR QL AUTO: ABNORMAL /HPF
BILIRUB UR QL STRIP: NEGATIVE
BUN SERPL-MCNC: 39 MG/DL (ref 5–25)
CALCIUM SERPL-MCNC: 9.3 MG/DL (ref 8.4–10.2)
CHLORIDE SERPL-SCNC: 103 MMOL/L (ref 96–108)
CLARITY UR: CLEAR
CO2 SERPL-SCNC: 26 MMOL/L (ref 21–32)
COLOR UR: ABNORMAL
CREAT SERPL-MCNC: 2.06 MG/DL (ref 0.6–1.3)
CREAT UR-MCNC: 106.4 MG/DL
CREAT UR-MCNC: 106.4 MG/DL
ERYTHROCYTE [DISTWIDTH] IN BLOOD BY AUTOMATED COUNT: 15.8 % (ref 11.6–15.1)
EST. AVERAGE GLUCOSE BLD GHB EST-MCNC: 163 MG/DL
FERRITIN SERPL-MCNC: 46 NG/ML (ref 24–336)
GFR SERPL CREATININE-BSD FRML MDRD: 29 ML/MIN/1.73SQ M
GLUCOSE P FAST SERPL-MCNC: 125 MG/DL (ref 65–99)
GLUCOSE UR STRIP-MCNC: NEGATIVE MG/DL
HBA1C MFR BLD: 7.3 %
HCT VFR BLD AUTO: 37.3 % (ref 36.5–49.3)
HGB BLD-MCNC: 11.9 G/DL (ref 12–17)
HGB UR QL STRIP.AUTO: NEGATIVE
HYALINE CASTS #/AREA URNS LPF: ABNORMAL /LPF
IRON SATN MFR SERPL: 23 % (ref 15–50)
IRON SERPL-MCNC: 64 UG/DL (ref 50–212)
KETONES UR STRIP-MCNC: NEGATIVE MG/DL
LEUKOCYTE ESTERASE UR QL STRIP: NEGATIVE
MAGNESIUM SERPL-MCNC: 2 MG/DL (ref 1.9–2.7)
MCH RBC QN AUTO: 30.1 PG (ref 26.8–34.3)
MCHC RBC AUTO-ENTMCNC: 31.9 G/DL (ref 31.4–37.4)
MCV RBC AUTO: 94 FL (ref 82–98)
MICROALBUMIN UR-MCNC: 358.4 MG/L
MICROALBUMIN/CREAT 24H UR: 337 MG/G CREATININE (ref 0–30)
NITRITE UR QL STRIP: NEGATIVE
NON-SQ EPI CELLS URNS QL MICRO: ABNORMAL /HPF
PH UR STRIP.AUTO: 6 [PH]
PHOSPHATE SERPL-MCNC: 3.4 MG/DL (ref 2.3–4.1)
PLATELET # BLD AUTO: 168 THOUSANDS/UL (ref 149–390)
PMV BLD AUTO: 11.5 FL (ref 8.9–12.7)
POTASSIUM SERPL-SCNC: 3.9 MMOL/L (ref 3.5–5.3)
PROT UR STRIP-MCNC: ABNORMAL MG/DL
PROT UR-MCNC: 60.5 MG/DL
PROT/CREAT UR: 0.6 MG/G{CREAT} (ref 0–0.1)
PTH-INTACT SERPL-MCNC: 99.3 PG/ML (ref 12–88)
RBC # BLD AUTO: 3.95 MILLION/UL (ref 3.88–5.62)
RBC #/AREA URNS AUTO: ABNORMAL /HPF
SODIUM SERPL-SCNC: 140 MMOL/L (ref 135–147)
SP GR UR STRIP.AUTO: 1.01 (ref 1–1.03)
TIBC SERPL-MCNC: 279 UG/DL (ref 250–450)
UIBC SERPL-MCNC: 215 UG/DL (ref 155–355)
URATE SERPL-MCNC: 4.1 MG/DL (ref 3.5–8.5)
UROBILINOGEN UR STRIP-ACNC: <2 MG/DL
WBC # BLD AUTO: 6.43 THOUSAND/UL (ref 4.31–10.16)
WBC #/AREA URNS AUTO: ABNORMAL /HPF

## 2024-09-04 PROCEDURE — 85027 COMPLETE CBC AUTOMATED: CPT

## 2024-09-04 PROCEDURE — 82728 ASSAY OF FERRITIN: CPT

## 2024-09-04 PROCEDURE — 83735 ASSAY OF MAGNESIUM: CPT

## 2024-09-04 PROCEDURE — 36415 COLL VENOUS BLD VENIPUNCTURE: CPT

## 2024-09-04 PROCEDURE — 83550 IRON BINDING TEST: CPT

## 2024-09-04 PROCEDURE — 84100 ASSAY OF PHOSPHORUS: CPT

## 2024-09-04 PROCEDURE — 84550 ASSAY OF BLOOD/URIC ACID: CPT

## 2024-09-04 PROCEDURE — 83970 ASSAY OF PARATHORMONE: CPT

## 2024-09-04 PROCEDURE — G0439 PPPS, SUBSEQ VISIT: HCPCS | Performed by: FAMILY MEDICINE

## 2024-09-04 PROCEDURE — 80048 BASIC METABOLIC PNL TOTAL CA: CPT

## 2024-09-04 PROCEDURE — 81001 URINALYSIS AUTO W/SCOPE: CPT

## 2024-09-04 PROCEDURE — 82043 UR ALBUMIN QUANTITATIVE: CPT

## 2024-09-04 PROCEDURE — 83540 ASSAY OF IRON: CPT

## 2024-09-04 PROCEDURE — 83036 HEMOGLOBIN GLYCOSYLATED A1C: CPT | Performed by: FAMILY MEDICINE

## 2024-09-04 PROCEDURE — 99214 OFFICE O/P EST MOD 30 MIN: CPT | Performed by: FAMILY MEDICINE

## 2024-09-04 PROCEDURE — 82570 ASSAY OF URINE CREATININE: CPT

## 2024-09-04 PROCEDURE — 82306 VITAMIN D 25 HYDROXY: CPT

## 2024-09-04 PROCEDURE — 84156 ASSAY OF PROTEIN URINE: CPT

## 2024-09-04 NOTE — PROGRESS NOTES
Ambulatory Visit  Name: Jay Roach Jr.      : 1945      MRN: [id number]  Encounter Provider: Simón Hadley MD  Encounter Date: 2024   Encounter department: [facility]    Assessment & Plan   1. Well adult exam  2. Type 2 diabetes mellitus with stage 4 chronic kidney disease, without long-term current use of insulin (HCC)  Assessment & Plan:  Patient is stable with current meds and discussed a low carb diet. Pt  recommended to see eye doctor and foot doctor for routine screening as per protocol. Recheck A1C  and Cr in 3 months. Patient questions answered today about this condtion.   Lab Results   Component Value Date    HGBA1C 6.6 (H) 2024     Orders:  -     Hemoglobin A1C  3. S/P TAVR (transcatheter aortic valve replacement)  Assessment & Plan:  Patient is stable  and will continue present plan of care and reassess at next routine visit. All questions about this problem from patient were answered today.   4. S/P CABG (coronary artery bypass graft)  Assessment & Plan:  Patient to continue  with current cardiac meds to decrease risk of re stenosis. Patient to follow up with cardiology for scheduled appointments to decrease risk for further cardiac problems with appropriate diagnostic testing to reassess cardiac status. Patient had alll questions about this problem answered today.   5. Primary non-small cell carcinoma of lower lobe of left lung (HCC)  Assessment & Plan:  Patient is stable  and will continue present plan of care and reassess at next routine visit. All questions about this problem from patient were answered today.   6. Primary hypertension  Assessment & Plan:  Patient is stable with current anti-hypertensive medicine and continue to follow a low sodium diet and take current medication. All questions about this condition were answered today.   7. Mixed hyperlipidemia  Assessment & Plan:  Patient  is stable with current medication and we discussed a  low fat low cholesterol diet. Weight loss also discussed for this will help lower cholesterol also. Recheck lipids in 6 months.   8. Gastroesophageal reflux disease without esophagitis  Assessment & Plan:  Patient to continue with present therapy and decrease caffeine, avoid ETOH and smoking to decrease acid production. Pt should also cease eating prior to bedtime and avoid excessive fluid intake prior to sleep. May use antacids as needed for breakthrough GERD. All pateint questions answered today about this condition.   9. Atherosclerosis of native arteries of extremities with intermittent claudication, bilateral legs (HCC)  Assessment & Plan:  Patient is stable  and will continue present plan of care and reassess at next routine visit. All questions about this problem from patient were answered today.        Preventive health issues were discussed with patient, and age appropriate screening tests were ordered as noted in patient's After Visit Summary. Personalized health advice and appropriate referrals for health education or preventive services given if needed, as noted in patient's After Visit Summary.    History of Present Illness     HPI   Patient Care Team:  Simón Hadley MD as PCP - General (Family Medicine)  MD Eagle Braswell MD J Raymond Fitzpatrick, MD Melinda McLane, CRNP Marcus Averbach, MD Amit Bhikhalal Sohagia, MD (Gastroenterology)  Marcle Muñoz MD (Nephrology)  Ly Schofield MD (Thoracic Surgery)  Lilia Herrera MD (Radiation Oncology)    Review of Systems  Medical History Reviewed by provider this encounter:       Annual Wellness Visit Questionnaire       Health Risk Assessment:   Patient rates overall health as fair. Patient feels that their physical health rating is same. Patient is satisfied with their life. Eyesight was rated as same. Hearing was rated as same. Patient feels that their emotional and mental health rating is same. Patients states they are never,  rarely angry. Patient states they are never, rarely unusually tired/fatigued. Pain experienced in the last 7 days has been some. Patient's pain rating has been 4/10. Patient states that he has experienced no weight loss or gain in last 6 months.     Depression Screening:   PHQ-2 Score: 0      Fall Risk Screening:   In the past year, patient has experienced: no history of falling in past year      Home Safety:  Patient does not have trouble with stairs inside or outside of their home. Patient has working smoke alarms and has no working carbon monoxide detector. Home safety hazards include: none.     Nutrition:   Current diet is Regular.     Medications:   Patient is currently taking over-the-counter supplements. OTC medications include: see medication list. Patient is not able to manage medications.     Activities of Daily Living (ADLs)/Instrumental Activities of Daily Living (IADLs):   Walk and transfer into and out of bed and chair?: Yes  Dress and groom yourself?: Yes    Bathe or shower yourself?: Yes    Feed yourself? Yes  Do your laundry/housekeeping?: Yes  Manage your money, pay your bills and track your expenses?: Yes  Make your own meals?: Yes    Do your own shopping?: Yes    Previous Hospitalizations:   Any hospitalizations or ED visits within the last 12 months?: Yes    How many hospitalizations have you had in the last year?: 3-4    Advance Care Planning:   Living will: Yes    Durable POA for healthcare: Yes    Advanced directive: Yes      PREVENTIVE SCREENINGS      Cardiovascular Screening:    General: Screening Not Indicated and History Lipid Disorder      Diabetes Screening:     General: Screening Not Indicated and History Diabetes      Prostate Cancer Screening:    General: Screening Not Indicated      Abdominal Aortic Aneurysm (AAA) Screening:    Risk factors include: tobacco use        Lung Cancer Screening:     General: Screening Not Indicated and History Lung Cancer      Hepatitis C Screening:     "General: Screening Current    Screening, Brief Intervention, and Referral to Treatment (SBIRT)    Screening      AUDIT-C Screenin) How often did you have a drink containing alcohol in the past year? never  2) How many drinks did you have on a typical day when you were drinking in the past year? 0  3) How often did you have 6 or more drinks on one occasion in the past year? never    AUDIT-C Score: 0  Interpretation: Score 0-3 (male): Negative screen for alcohol misuse    Single Item Drug Screening:  How often have you used an illegal drug (including marijuana) or a prescription medication for non-medical reasons in the past year? never    Single Item Drug Screen Score: 0  Interpretation: Negative screen for possible drug use disorder    Social Determinants of Health     Financial Resource Strain: Low Risk  (2023)    Overall Financial Resource Strain (CARDIA)    • Difficulty of Paying Living Expenses: Not very hard   Food Insecurity: No Food Insecurity (2024)    Hunger Vital Sign    • Worried About Running Out of Food in the Last Year: Never true    • Ran Out of Food in the Last Year: Never true   Transportation Needs: No Transportation Needs (2024)    PRAPARE - Transportation    • Lack of Transportation (Medical): No    • Lack of Transportation (Non-Medical): No   Housing Stability: Low Risk  (2024)    Housing Stability Vital Sign    • Unable to Pay for Housing in the Last Year: No    • Number of Times Moved in the Last Year: 1    • Homeless in the Last Year: No   Utilities: Not At Risk (2024)    [facility] Utilities    • Threatened with loss of utilities: No     No results found.    Objective     /58 (BP Location: Left arm, Patient Position: Sitting, Cuff Size: Standard)   Pulse 57   Temp 97.9 °F (36.6 °C) (Skin)   Ht 5' 10\" (1.778 m)   Wt 78.9 kg (174 lb)   SpO2 98%   BMI 24.97 kg/m²     Physical Exam      "

## 2024-09-04 NOTE — PATIENT INSTRUCTIONS
Medicare Preventive Visit Patient Instructions  Thank you for completing your Welcome to Medicare Visit or Medicare Annual Wellness Visit today. Your next wellness visit will be due in one year (9/5/2025).  The screening/preventive services that you may require over the next 5-10 years are detailed below. Some tests may not apply to you based off risk factors and/or age. Screening tests ordered at today's visit but not completed yet may show as past due. Also, please note that scanned in results may not display below.  Preventive Screenings:  Service Recommendations Previous Testing/Comments   Colorectal Cancer Screening  Colonoscopy    Fecal Occult Blood Test (FOBT)/Fecal Immunochemical Test (FIT)  Fecal DNA/Cologuard Test  Flexible Sigmoidoscopy Age: 45-75 years old   Colonoscopy: every 10 years (May be performed more frequently if at higher risk)  OR  FOBT/FIT: every 1 year  OR  Cologuard: every 3 years  OR  Sigmoidoscopy: every 5 years  Screening may be recommended earlier than age 45 if at higher risk for colorectal cancer. Also, an individualized decision between you and your healthcare provider will decide whether screening between the ages of 76-85 would be appropriate. Colonoscopy: 12/08/2023  FOBT/FIT: Not on file  Cologuard: Not on file  Sigmoidoscopy: Not on file          Prostate Cancer Screening Individualized decision between patient and health care provider in men between ages of 55-69   Medicare will cover every 12 months beginning on the day after your 50th birthday PSA: No results in last 5 years     Screening Not Indicated     Hepatitis C Screening Once for adults born between 1945 and 1965  More frequently in patients at high risk for Hepatitis C Hep C Antibody: 09/24/2021    Screening Current   Diabetes Screening 1-2 times per year if you're at risk for diabetes or have pre-diabetes Fasting glucose: 167 mg/dL (7/19/2024)  A1C: 6.6 % (3/5/2024)  Screening Not Indicated  History Diabetes    Cholesterol Screening Once every 5 years if you don't have a lipid disorder. May order more often based on risk factors. Lipid panel: 05/05/2022  Screening Not Indicated  History Lipid Disorder      Other Preventive Screenings Covered by Medicare:  Abdominal Aortic Aneurysm (AAA) Screening: covered once if your at risk. You're considered to be at risk if you have a family history of AAA or a male between the age of 65-75 who smoking at least 100 cigarettes in your lifetime.  Lung Cancer Screening: covers low dose CT scan once per year if you meet all of the following conditions: (1) Age 55-77; (2) No signs or symptoms of lung cancer; (3) Current smoker or have quit smoking within the last 15 years; (4) You have a tobacco smoking history of at least 20 pack years (packs per day x number of years you smoked); (5) You get a written order from a healthcare provider.  Glaucoma Screening: covered annually if you're considered high risk: (1) You have diabetes OR (2) Family history of glaucoma OR (3)  aged 50 and older OR (4)  American aged 65 and older  Osteoporosis Screening: covered every 2 years if you meet one of the following conditions: (1) Have a vertebral abnormality; (2) On glucocorticoid therapy for more than 3 months; (3) Have primary hyperparathyroidism; (4) On osteoporosis medications and need to assess response to drug therapy.  HIV Screening: covered annually if you're between the age of 15-65. Also covered annually if you are younger than 15 and older than 65 with risk factors for HIV infection. For pregnant patients, it is covered up to 3 times per pregnancy.    Immunizations:  Immunization Recommendations   Influenza Vaccine Annual influenza vaccination during flu season is recommended for all persons aged >= 6 months who do not have contraindications   Pneumococcal Vaccine   * Pneumococcal conjugate vaccine = PCV13 (Prevnar 13), PCV15 (Vaxneuvance), PCV20 (Prevnar 20)  *  Pneumococcal polysaccharide vaccine = PPSV23 (Pneumovax) Adults 19-63 yo with certain risk factors or if 65+ yo  If never received any pneumonia vaccine: recommend Prevnar 20 (PCV20)  Give PCV20 if previously received 1 dose of PCV13 or PPSV23   Hepatitis B Vaccine 3 dose series if at intermediate or high risk (ex: diabetes, end stage renal disease, liver disease)   Respiratory syncytial virus (RSV) Vaccine - COVERED BY MEDICARE PART D  * RSVPreF3 (Arexvy) CDC recommends that adults 60 years of age and older may receive a single dose of RSV vaccine using shared clinical decision-making (SCDM)   Tetanus (Td) Vaccine - COST NOT COVERED BY MEDICARE PART B Following completion of primary series, a booster dose should be given every 10 years to maintain immunity against tetanus. Td may also be given as tetanus wound prophylaxis.   Tdap Vaccine - COST NOT COVERED BY MEDICARE PART B Recommended at least once for all adults. For pregnant patients, recommended with each pregnancy.   Shingles Vaccine (Shingrix) - COST NOT COVERED BY MEDICARE PART B  2 shot series recommended in those 19 years and older who have or will have weakened immune systems or those 50 years and older     Health Maintenance Due:      Topic Date Due   • Hepatitis C Screening  Completed   • Lung Cancer Screening  Discontinued   • Colorectal Cancer Screening  Discontinued     Immunizations Due:      Topic Date Due   • Hepatitis A Vaccine (1 of 2 - Risk 2-dose series) Never done   • COVID-19 Vaccine (6 - 2023-24 season) 09/01/2024   • Influenza Vaccine (1) 09/01/2024     Advance Directives   What are advance directives?  Advance directives are legal documents that state your wishes and plans for medical care. These plans are made ahead of time in case you lose your ability to make decisions for yourself. Advance directives can apply to any medical decision, such as the treatments you want, and if you want to donate organs.   What are the types of advance  directives?  There are many types of advance directives, and each state has rules about how to use them. You may choose a combination of any of the following:  Living will:  This is a written record of the treatment you want. You can also choose which treatments you do not want, which to limit, and which to stop at a certain time. This includes surgery, medicine, IV fluid, and tube feedings.   Durable power of  for healthcare (DPAHC):  This is a written record that states who you want to make healthcare choices for you when you are unable to make them for yourself. This person, called a proxy, is usually a family member or a friend. You may choose more than 1 proxy.  Do not resuscitate (DNR) order:  A DNR order is used in case your heart stops beating or you stop breathing. It is a request not to have certain forms of treatment, such as CPR. A DNR order may be included in other types of advance directives.  Medical directive:  This covers the care that you want if you are in a coma, near death, or unable to make decisions for yourself. You can list the treatments you want for each condition. Treatment may include pain medicine, surgery, blood transfusions, dialysis, IV or tube feedings, and a ventilator (breathing machine).  Values history:  This document has questions about your views, beliefs, and how you feel and think about life. This information can help others choose the care that you would choose.  Why are advance directives important?  An advance directive helps you control your care. Although spoken wishes may be used, it is better to have your wishes written down. Spoken wishes can be misunderstood, or not followed. Treatments may be given even if you do not want them. An advance directive may make it easier for your family to make difficult choices about your care.   Cigarette Smoking and Your Health   Risks to your health if you smoke:  Nicotine and other chemicals found in tobacco damage every  cell in your body. Even if you are a light smoker, you have an increased risk for cancer, heart disease, and lung disease. If you are pregnant or have diabetes, smoking increases your risk for complications.   Benefits to your health if you stop smoking:   You decrease respiratory symptoms such as coughing, wheezing, and shortness of breath.   You reduce your risk for cancers of the lung, mouth, throat, kidney, bladder, pancreas, stomach, and cervix. If you already have cancer, you increase the benefits of chemotherapy. You also reduce your risk for cancer returning or a second cancer from developing.   You reduce your risk for heart disease, blood clots, heart attack, and stroke.   You reduce your risk for lung infections, and diseases such as pneumonia, asthma, chronic bronchitis, and emphysema.  Your circulation improves. More oxygen can be delivered to your body. If you have diabetes, you lower your risk for complications, such as kidney, artery, and eye diseases. You also lower your risk for nerve damage. Nerve damage can lead to amputations, poor vision, and blindness.  You improve your body's ability to heal and to fight infections.  For more information and support to stop smoking:   Smokefree.gov  Phone: 6- 983 - 423-7144  Web Address: www.[employer]TV.gov     © Copyright [employer] 2018 Information is for End User's use only and may not be sold, redistributed or otherwise used for commercial purposes. All illustrations and images included in CareNotes® are the copyrighted property of [employer].A[employer]M., Inc[employer] or [employer]

## 2024-09-04 NOTE — TELEPHONE ENCOUNTER
Spoke with patient, he said he got a call, but not sure if it was about an appointment.  Upon review of chart, no messages regarding labs results-looks like some of the labs are still in process. Please call patient once labs are resulted and reviewed.

## 2024-09-04 NOTE — RESULT ENCOUNTER NOTE
Please inform patient that renal function is stable at creatinine 2.06 mg/dL, electrolytes are stable.  Continue same treatment, will discuss further during office visit this month

## 2024-09-04 NOTE — TELEPHONE ENCOUNTER
Phone call from patient returning call for lab results - transferred to [facility] for further discussion.

## 2024-09-05 ENCOUNTER — TELEPHONE (OUTPATIENT)
Dept: NEPHROLOGY | Facility: CLINIC | Age: 79
End: 2024-09-05

## 2024-09-05 NOTE — TELEPHONE ENCOUNTER
I left a message for Jay this morning regarding his labs , renal function is stable including electrolytes. no changes are to be made at this time.

## 2024-09-05 NOTE — TELEPHONE ENCOUNTER
----- Message from Marcel Muñoz MD sent at 9/4/2024  4:26 PM EDT -----  Please inform patient that renal function is stable at creatinine 2.06 mg/dL, electrolytes are stable.  Continue same treatment, will discuss further during office visit this month

## 2024-09-09 ENCOUNTER — TELEPHONE (OUTPATIENT)
Age: 79
End: 2024-09-09

## 2024-09-09 ENCOUNTER — TELEPHONE (OUTPATIENT)
Dept: RADIATION ONCOLOGY | Facility: CLINIC | Age: 79
End: 2024-09-09

## 2024-09-09 NOTE — TELEPHONE ENCOUNTER
RAD ONC - follow up call to Mr. Roach.  Identified self and reason for call, ask patient to return call -  re:  MRI Brain 8/31/24 cancelled,     Patient (Radio-opaque material seen on most recent CT. Pt has been scanned previously, however no documentation provided on EPIC, Pt is unaware what it is. Radiologist unsure. Unable to proceed at the moment until implant documentation or origin is discovered.)     Patient is scheduled for tele-med F/U with Dr. Herrera 9/13/24...KD

## 2024-09-09 NOTE — TELEPHONE ENCOUNTER
Provider: Dr. Herrera    Patient calling in inquiring when he is going to have his MRI completed, stating that he went to have it done, but a technologist said that he couldn't have it completed and that they didn't have a radiologist in at the time and they would return his call to reschedule.  I provided patient with central scheduling phone number to call and get that MRI scheduled and instructed him to return our phone call if he has any other needs or difficulty getting it scheduled. Patient verbalized understanding and has no additional questions or concerns at this moment.

## 2024-09-10 NOTE — TELEPHONE ENCOUNTER
Reviewed with Radiology.  No metal or solid objects noted on PET in head.  Valve replacement noted in chest CT.  This is not contraindication for MR.  MR abdomen done June 2024 without complication.  MRI brain has been rescheduled.  Cleared  for imaging

## 2024-09-10 NOTE — TELEPHONE ENCOUNTER
RAD ONC - Confirmed w/ pt. MRI Brain has been r/s to 9/13/24 at the [facility].  Tele-med visit r/s to 9/20/24- pt. Appreciative and verbalized understanding...KD

## 2024-09-12 ENCOUNTER — HOSPITAL ENCOUNTER (EMERGENCY)
Facility: HOSPITAL | Age: 79
Discharge: HOME | End: 2024-09-12
Attending: EMERGENCY MEDICINE
Payer: MEDICARE

## 2024-09-12 VITALS
HEART RATE: 78 BPM | OXYGEN SATURATION: 98 % | SYSTOLIC BLOOD PRESSURE: 160 MMHG | DIASTOLIC BLOOD PRESSURE: 65 MMHG | RESPIRATION RATE: 16 BRPM

## 2024-09-12 DIAGNOSIS — Z51.89 WOUND CHECK, ABSCESS: Primary | ICD-10-CM

## 2024-09-12 DIAGNOSIS — L03.90 CELLULITIS: ICD-10-CM

## 2024-09-12 PROCEDURE — 99284 EMERGENCY DEPT VISIT MOD MDM: CPT

## 2024-09-12 PROCEDURE — 99284 EMERGENCY DEPT VISIT MOD MDM: CPT | Performed by: EMERGENCY MEDICINE

## 2024-09-12 RX ORDER — GINSENG 100 MG
1 CAPSULE ORAL 2 TIMES DAILY
Qty: 28 G | Refills: 0 | Status: SHIPPED | OUTPATIENT
Start: 2024-09-12 | End: 2024-09-19

## 2024-09-12 RX ORDER — GINSENG 100 MG
1 CAPSULE ORAL ONCE
Status: COMPLETED | OUTPATIENT
Start: 2024-09-12 | End: 2024-09-12

## 2024-09-12 RX ADMIN — BACITRACIN ZINC 1 SMALL APPLICATION: 500 OINTMENT TOPICAL at 11:17

## 2024-09-12 RX ADMIN — CEPHALEXIN 500 MG: 250 CAPSULE ORAL at 11:17

## 2024-09-12 NOTE — ED PROVIDER NOTES
1. Wound check, abscess    2. Cellulitis      ED Disposition       ED Disposition   Discharge    Condition   Stable    Date/Time   Thu Sep 12, 2024 11:07 AM    Comment   Jay Roach Jr. discharge to home/self care.                   Assessment & Plan       Medical Decision Making  Differential diagnosis includes but is not limited to abscess, cellulitis, less likely    Problems Addressed:  Cellulitis: acute illness or injury  Wound check, abscess: acute illness or injury    Risk  OTC drugs.  Prescription drug management.                       Medications   bacitracin topical ointment 1 small application (1 small application Topical Given 9/12/24 1117)   cephalexin (KEFLEX) capsule 500 mg (500 mg Oral Given 9/12/24 1117)       History of Present Illness       79-year-old male comes in for evaluation of wound on leg.  States he has a small round red raised area states been there for approximately 2 weeks no drainage from it.  Patient is concerned for MRSA.  No fevers.  Patient also complains of intermittent headache does not have a headache right now and is going for an MRI tomorrow.  Offered pain medication for headache but patient declined.  Patient also had blood work on September 4 and declined getting blood work today.      History provided by:  Patient   used: No    Abscess  Location:  Leg  Leg abscess location:  L lower leg  Size:  1  Abscess quality: induration, painful and redness    Red streaking: no    Duration:  2 weeks  Progression:  Worsening  Pain details:     Quality:  Pressure    Severity:  Moderate    Duration:  2 weeks    Timing:  Constant    Progression:  Worsening  Chronicity:  New  Context: diabetes and skin injury    Ineffective treatments:  None tried  Associated symptoms: headaches    Associated symptoms: no anorexia, no fatigue, no fever, no nausea and no vomiting            Review of Systems   Constitutional:  Negative for chills, fatigue and fever.   HENT:   Negative for ear pain and sore throat.    Eyes:  Negative for pain and visual disturbance.   Respiratory:  Negative for cough and shortness of breath.    Cardiovascular:  Negative for chest pain and palpitations.   Gastrointestinal:  Negative for abdominal pain, anorexia, nausea and vomiting.   Genitourinary:  Negative for dysuria and hematuria.   Musculoskeletal:  Negative for arthralgias and back pain.   Skin:  Negative for color change and rash.   Neurological:  Positive for headaches. Negative for seizures and syncope.   All other systems reviewed and are negative.          Objective     ED Triage Vitals [09/12/24 1046]   Temp Pulse Blood Pressure Respirations SpO2 Patient Position - Orthostatic VS   -- 78 160/65 16 98 % Lying      Temp src Heart Rate Source BP Location FiO2 (%) Pain Score    -- Monitor Right arm -- 4        Physical Exam  Constitutional:       Appearance: He is well-developed.   HENT:      Head: Normocephalic and atraumatic.      Right Ear: External ear normal.      Left Ear: External ear normal.   Eyes:      Conjunctiva/sclera: Conjunctivae normal.      Pupils: Pupils are equal, round, and reactive to light.   Neck:      Thyroid: No thyroid mass.      Vascular: No carotid bruit.   Cardiovascular:      Rate and Rhythm: Normal rate and regular rhythm.      Heart sounds: Normal heart sounds. No murmur heard.     No friction rub. No gallop.   Pulmonary:      Effort: Pulmonary effort is normal. No respiratory distress.      Breath sounds: Normal breath sounds. No wheezing or rales.   Chest:      Chest wall: No tenderness.   Abdominal:      General: Bowel sounds are normal. There is no distension.      Palpations: Abdomen is soft.      Tenderness: There is no abdominal tenderness. There is no guarding or rebound.   Musculoskeletal:         General: Normal range of motion.      Cervical back: Normal range of motion and neck supple.   Lymphadenopathy:      Cervical: No cervical adenopathy.   Skin:      General: Skin is warm and dry.      Findings: Abscess present. No rash.          Neurological:      Mental Status: He is alert and oriented to person, place, and time.      GCS: GCS eye subscore is 4. GCS verbal subscore is 5. GCS motor subscore is 6.      Cranial Nerves: No cranial nerve deficit.      Sensory: No sensory deficit.      Deep Tendon Reflexes: Reflexes are normal and symmetric.   Psychiatric:         Speech: Speech normal.         Behavior: Behavior normal.         Thought Content: Thought content normal.         Judgment: Judgment normal.         Labs Reviewed - No data to display  No orders to display       Procedures       Vera Oliva,   09/12/24 1141

## 2024-09-13 ENCOUNTER — HOSPITAL ENCOUNTER (OUTPATIENT)
Dept: MRI IMAGING | Facility: HOSPITAL | Age: 79
End: 2024-09-13
Attending: RADIOLOGY
Payer: MEDICARE

## 2024-09-13 ENCOUNTER — VBI (OUTPATIENT)
Dept: FAMILY MEDICINE CLINIC | Facility: CLINIC | Age: 79
End: 2024-09-13

## 2024-09-13 DIAGNOSIS — C34.32 PRIMARY NON-SMALL CELL CARCINOMA OF LOWER LOBE OF LEFT LUNG (HCC): ICD-10-CM

## 2024-09-13 PROCEDURE — A9585 GADOBUTROL INJECTION: HCPCS | Performed by: RADIOLOGY

## 2024-09-13 PROCEDURE — 70553 MRI BRAIN STEM W/O & W/DYE: CPT

## 2024-09-13 RX ORDER — GADOBUTROL 604.72 MG/ML
3.5 INJECTION INTRAVENOUS
Status: COMPLETED | OUTPATIENT
Start: 2024-09-13 | End: 2024-09-13

## 2024-09-13 RX ADMIN — GADOBUTROL 3.5 ML: 604.72 INJECTION INTRAVENOUS at 18:00

## 2024-09-13 NOTE — TELEPHONE ENCOUNTER
09/13/24 11:23 AM    Patient contacted post ED visit, first outreach attempt made. Message was left for patient to return a call to the [facility] Department at [facility]: Phone [phone].    Thank you.  [facility] Cardoza  [facility]

## 2024-09-13 NOTE — TELEPHONE ENCOUNTER
09/13/24 11:14 AM    Patient contacted post ED visit, first outreach attempt made. Message was left for patient to return a call to the [facility] Department at [facility]: Phone [phone].    Thank you.  [facility] Cardoza  [facility]

## 2024-09-16 ENCOUNTER — OFFICE VISIT (OUTPATIENT)
Dept: FAMILY MEDICINE CLINIC | Facility: CLINIC | Age: 79
End: 2024-09-16
Payer: MEDICARE

## 2024-09-16 VITALS
WEIGHT: 170 LBS | DIASTOLIC BLOOD PRESSURE: 52 MMHG | TEMPERATURE: 97.5 F | HEART RATE: 64 BPM | SYSTOLIC BLOOD PRESSURE: 166 MMHG | BODY MASS INDEX: 24.34 KG/M2 | OXYGEN SATURATION: 98 % | HEIGHT: 70 IN

## 2024-09-16 DIAGNOSIS — L98.9 SKIN LESION OF LEFT LEG: Primary | ICD-10-CM

## 2024-09-16 PROCEDURE — 99213 OFFICE O/P EST LOW 20 MIN: CPT | Performed by: FAMILY MEDICINE

## 2024-09-16 PROCEDURE — G2211 COMPLEX E/M VISIT ADD ON: HCPCS | Performed by: FAMILY MEDICINE

## 2024-09-16 RX ORDER — CIPROFLOXACIN 500 MG/1
500 TABLET, FILM COATED ORAL EVERY 12 HOURS SCHEDULED
Qty: 20 TABLET | Refills: 0 | Status: SHIPPED | OUTPATIENT
Start: 2024-09-16 | End: 2024-09-26

## 2024-09-16 NOTE — PROGRESS NOTES
Ambulatory Visit  Name: Jay Roach Jr.      : 1945      MRN: [id number]  Encounter Provider: Simón Hadley MD  Encounter Date: 2024   Encounter department: [facility]    Assessment & Plan  Skin lesion of left leg              History of Present Illness     79-year-old male here for checkup on lesion of his left lower extremity.  Patient was seen in the ER was treated with Keflex for cellulitis and looks as though he has a growth in the middle of his mid shin.  Its approximately a little less than a centimeter in diameter and it looks like it may be an acute acanthoma.  It is not but is not infected although he is on Keflex I will see about changing over to some Cipro to resee surgery for proper removal of this lesion and for identification by biopsy.    Review of Systems   Constitutional:  Negative for activity change, appetite change, chills, fatigue, fever and unexpected weight change.   HENT:  Negative for congestion, ear pain, hearing loss, mouth sores, postnasal drip, sinus pressure, sinus pain, sneezing and sore throat.    Respiratory:  Negative for apnea, cough, shortness of breath and wheezing.    Cardiovascular:  Negative for chest pain, palpitations and leg swelling.   Gastrointestinal:  Negative for abdominal pain, constipation, diarrhea, nausea and vomiting.   Endocrine: Negative for cold intolerance and heat intolerance.   Genitourinary:  Negative for dysuria, frequency and hematuria.   Musculoskeletal:  Negative for arthralgias, back pain, gait problem, joint swelling and neck pain.   Skin:  Positive for color change. Negative for rash.   Neurological:  Negative for dizziness, weakness and numbness.   Hematological:  Does not bruise/bleed easily.   Psychiatric/Behavioral:  Negative for agitation, behavioral problems, confusion, hallucinations and sleep disturbance. The patient is not nervous/anxious.      Past Medical History:   Diagnosis Date   •  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 ([facility]) 09/10/2021   • 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 ([facility]) 02/24/2023   • Basal cell carcinoma     right cheek   • CAD (coronary artery disease)    • Carotid stenosis, asymptomatic, bilateral    • Chronic kidney disease    • Colon polyp    • Dependence on respirator (ventilator) status ([facility]) 04/07/2023   • Diabetes ([facility])     type 2, non-insulin dependent   • Diabetes mellitus ([facility])    • GERD (gastroesophageal reflux disease)    • History of nephrolithiasis    • Hyperlipidemia    • Hypertension    • Left foot pain 11/30/2022   • Lung cancer ([facility])    • PAD (peripheral artery disease) ([facility])    • Severe aortic stenosis    • Squamous cell skin cancer 11/22/2022    left superior helix     Past Surgical History:   Procedure Laterality Date   • APPENDECTOMY     • CARDIAC CATHETERIZATION N/A 05/05/2022    Procedure: CARDIAC RHC/LHC;  Surgeon: Arvin Sanchez DO;  [facility];  Service: Cardiology   • CARDIAC CATHETERIZATION N/A 05/05/2022    Procedure: Cardiac Coronary Angiogram;  Surgeon: Arvin Sanchez DO;  [facility];  Service: Cardiology   • CARDIAC CATHETERIZATION N/A 05/24/2022    Procedure: Cardiac pci;  Surgeon: Damien Jeter MD;  [facility];  Service: Cardiology   • CARDIAC CATHETERIZATION N/A 06/14/2022    Procedure: CARDIAC TAVR;  Surgeon: Nahmu Vaz MD;  [facility];  Service: Cardiology   • COLECTOMY     • COLONOSCOPY  2013   • CORONARY ARTERY BYPASS GRAFT  2013    X 2   • FEMORAL ARTERY - POPLITEAL ARTERY BYPASS GRAFT     • HEMORRHOID SURGERY     • IR AORTAGRAM WITH RUN-OFF  11/19/2018   • IR AORTAGRAM WITH RUN-OFF  03/02/2023   • IR BIOPSY LUNG  4/17/2024   • IR LOWER EXTREMITY ANGIOGRAM  03/23/2023   • MOHS SURGERY Left 01/11/2023    SCC left superior helix-Dr Tovar   • [place] SLCTV CATHJ 3RD+  ORD SLCTV ABDL PEL/LXTR BRNCH Left 08/12/2016    Procedure: LEFT FEMORAL ARTERIOGRAM; BALLOON ANGIOPLASTY; SFA  AND FEMORAL AT VEIN GRAFT;  Surgeon: Nicholas Urena MD;  [facility];  Service: Vascular   • DC TEAEC W/WO PATCH GRAFT COMMON FEMORAL Right 03/23/2023    Procedure: Common femoral endarterectomy&antegrade intervention of SFA, popliteal artery w/ shockwave;  Surgeon: Eagle Higuera MD;  [facility];  Service: Vascular   • DC TRANSCATHETER TRANSAPICAL REPLACEMT AORTIC VALVE N/A 06/14/2022    Procedure: REPLACEMENT AORTIC VALVE TRANSCATHETER (TAVR) TRANSAPICAL 29MM IRVIN KYLER S3 ULTRA VALVE(ACCESS ON LEFT) ELANA;  Surgeon: Amarjit Gordon DO;  [facility];  Service: Cardiac Surgery   • SKIN CANCER EXCISION     • TONSILLECTOMY AND ADENOIDECTOMY     • UPPER GASTROINTESTINAL ENDOSCOPY       Family History   Problem Relation Age of Onset   • Heart attack Father    • Other Sister         bypass and vlave replacement   • Stroke Paternal Uncle    • Arrhythmia Neg Hx    • Asthma Neg Hx    • Clotting disorder Neg Hx    • Fainting Neg Hx    • Anuerysm Neg Hx    • Hypertension Neg Hx         unsure    • Hyperlipidemia Neg Hx    • Heart failure Neg Hx      Social History     Tobacco Use   • Smoking status: Every Day     Current packs/day: 1.00     Average packs/day: 0.6 packs/day for 100.0 years (62.5 ttl pk-yrs)     Types: Cigarettes     Passive exposure: Current   • Smokeless tobacco: Never   • Tobacco comments:     4 cigarettes per day   Vaping Use   • Vaping status: Never Used   Substance and Sexual Activity   • Alcohol use: Not Currently     Comment: rare   • Drug use: No   • Sexual activity: Not Currently     Current Outpatient Medications on File Prior to Visit   Medication Sig   • allopurinol (ZYLOPRIM) 300 mg tablet TAKE 1 TABLET DAILY   • amLODIPine (NORVASC) 10 mg tablet TAKE 1 TABLET DAILY   • atorvastatin (LIPITOR) 80 mg tablet TAKE 1 TABLET DAILY AT     BEDTIME   •  bacitracin topical ointment 500 units/g topical ointment Apply 1 large application topically 2 (two) times a day for 7 days   • calcitriol (ROCALTROL) 0.25 mcg capsule Take 1 capsule (0.25 mcg total) by mouth daily   • cephalexin (KEFLEX) 250 mg capsule Take 1 capsule (250 mg total) by mouth 4 (four) times a day for 7 days   • clopidogrel (PLAVIX) 75 mg tablet Take 1 tablet (75 mg total) by mouth daily   • glimepiride (AMARYL) 1 mg tablet Take 1 tablet (1 mg total) by mouth daily with breakfast   • hydrALAZINE (APRESOLINE) 25 mg tablet Take 1 tablet (25 mg total) by mouth 2 (two) times a day   • lisinopril (ZESTRIL) 5 mg tablet Take 1 tablet (5 mg total) by mouth daily   • metoprolol succinate (TOPROL-XL) 25 mg 24 hr tablet Take 0.5 tablets (12.5 mg total) by mouth daily   • mupirocin (BACTROBAN) 2 % ointment Apply topically 3 (three) times a day (Patient not taking: Reported on 3/7/2024)   • pancrelipase, Lip-Prot-Amyl, (CREON) 24,000 units Take 24,000 units of lipase by mouth 3 (three) times a day with meals   • pantoprazole (PROTONIX) 40 mg tablet Take 1 tablet (40 mg total) by mouth 2 (two) times a day   • torsemide (DEMADEX) 20 mg tablet TAKE 0.5 TABLETS (10 MG TOTAL) BY MOUTH DAILY TAKES 10 MG ONCE A DAY.     No Known Allergies  Immunization History   Administered Date(s) Administered   • COVID-19 Moderna mRNA Vaccine 12 Yr+ 50 mcg/0.5 mL (Spikevax) 10/04/2023   • COVID-19 PFIZER VACCINE 0.3 ML IM 03/03/2021, 03/24/2021, 09/30/2021   • COVID-19 Pfizer Vac BIVALENT Stuart-sucrose 12 Yr+ IM 09/29/2022   • H1N1 Inj 11/15/2020   • H1N1, All Formulations 11/15/2020   • INFLUENZA 10/04/2011, 10/19/2012, 10/16/2014, 10/13/2016, 10/06/2018, 11/11/2021, 10/19/2022, 10/19/2023   • Influenza Split High Dose Preservative Free IM 10/06/2018, 11/01/2019   • Pneumococcal Conjugate 13-Valent 12/04/2015   • Pneumococcal Polysaccharide PPV23 06/11/2019   • Respiratory Syncytial Virus Vaccine (Recombinant, Adjuvanted) 10/04/2023  "  • Tdap 10/15/2021     Objective     /52 (BP Location: Left arm, Patient Position: Sitting, Cuff Size: Large)   Pulse 64   Temp 97.5 °F (36.4 °C)   Ht 5' 10\" (1.778 m)   Wt 77.1 kg (170 lb)   SpO2 98%   BMI 24.39 kg/m²     Physical Exam  Musculoskeletal:        Legs:    Skin:     Findings: Erythema present.              "

## 2024-09-16 NOTE — TELEPHONE ENCOUNTER
09/16/24 8:58 AM    Patient contacted post ED visit, [facility] department spoke with patient/caregiver and outreach was successful.    Thank you.  Reanna Cardoza  [facility]

## 2024-09-19 ENCOUNTER — OFFICE VISIT (OUTPATIENT)
Dept: GASTROENTEROLOGY | Facility: CLINIC | Age: 79
End: 2024-09-19
Payer: MEDICARE

## 2024-09-19 VITALS
BODY MASS INDEX: 24.34 KG/M2 | HEART RATE: 52 BPM | DIASTOLIC BLOOD PRESSURE: 56 MMHG | HEIGHT: 70 IN | SYSTOLIC BLOOD PRESSURE: 157 MMHG | WEIGHT: 170 LBS

## 2024-09-19 DIAGNOSIS — K86.81 EXOCRINE PANCREATIC INSUFFICIENCY: ICD-10-CM

## 2024-09-19 DIAGNOSIS — K86.1 OTHER CHRONIC PANCREATITIS (HCC): ICD-10-CM

## 2024-09-19 DIAGNOSIS — K25.4 GASTROINTESTINAL HEMORRHAGE ASSOCIATED WITH GASTRIC ULCER: Primary | ICD-10-CM

## 2024-09-19 PROCEDURE — 99213 OFFICE O/P EST LOW 20 MIN: CPT | Performed by: INTERNAL MEDICINE

## 2024-09-19 NOTE — PROGRESS NOTES
[facility] - Outpatient Follow-up Note  Jay Roach Jr. 79 y.o. male MRN: [id number]  Encounter: [id number]          ASSESSMENT AND PLAN:      1. Gastrointestinal hemorrhage associated with gastric ulcer  No further evidence of GI bleeding now off aspirin.  Will wean pantoprazole down to once daily in the morning 30 minutes before breakfast.  Continue iron supplementation.  Smoking cessation    2. Other chronic pancreatitis (HCC)  3. Exocrine pancreatic insufficiency  MRI/MRCP without clear evidence of pancreatic malignancy though imaging was compromised by presence of gastric hemostasis clips.  Will hold on further imaging as he would not be a surgical candidate in any case.  Continue pancreatic enzyme supplementation    ______________________________________________________________________    SUBJECTIVE: Patient with a number of severe comorbidities including type 2 diabetes with stage IV chronic kidney disease, coronary artery disease status post CABG, TAVR as well as recurrent gastrointestinal hemorrhage secondary to gastric ulcers.  He has been doing reasonably well with no further evidence of bleeding and slowly rising hemoglobin now off aspirin.  Continues on clopidogrel.  Currently on pantoprazole 40 mg twice daily.  Smokes cigarettes.      REVIEW OF SYSTEMS:    ROS       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 ([facility]) 09/10/2021    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 ([facility]) 02/24/2023    Basal cell carcinoma     right cheek    CAD (coronary artery disease)     Carotid stenosis, asymptomatic, bilateral     Chronic kidney disease     Colon polyp     Dependence on respirator (ventilator) status (HCC) 04/07/2023    Diabetes (HCC)     type 2, non-insulin dependent    Diabetes mellitus (HCC)     GERD (gastroesophageal  reflux disease)     History of nephrolithiasis     Hyperlipidemia     Hypertension     Left foot pain 11/30/2022    Lung cancer (HCC)     PAD (peripheral artery disease) (HCC)     Severe aortic stenosis     Squamous cell skin cancer 11/22/2022    left superior helix     Past Surgical History:   Procedure Laterality Date    APPENDECTOMY      CARDIAC CATHETERIZATION N/A 05/05/2022    Procedure: CARDIAC RHC/LHC;  Surgeon: Arvin Sanchez DO;  [facility];  Service: Cardiology    CARDIAC CATHETERIZATION N/A 05/05/2022    Procedure: Cardiac Coronary Angiogram;  Surgeon: Arvin Sanchez DO;  [facility];  Service: Cardiology    CARDIAC CATHETERIZATION N/A 05/24/2022    Procedure: Cardiac pci;  Surgeon: Damien Jeter MD;  [facility];  Service: Cardiology    CARDIAC CATHETERIZATION N/A 06/14/2022    Procedure: CARDIAC TAVR;  Surgeon: Nahum Vaz MD;  [facility];  Service: Cardiology    COLECTOMY      COLONOSCOPY  2013    CORONARY ARTERY BYPASS GRAFT  2013    X 2    FEMORAL ARTERY - POPLITEAL ARTERY BYPASS GRAFT      HEMORRHOID SURGERY      IR AORTAGRAM WITH RUN-OFF  11/19/2018    IR AORTAGRAM WITH RUN-OFF  03/02/2023    IR BIOPSY LUNG  4/17/2024    IR LOWER EXTREMITY ANGIOGRAM  03/23/2023    MOHS SURGERY Left 01/11/2023    SCC left superior helix-Dr Guy    [place] SLCTV CATHJ 3RD+ ORD SLCTV ABDL PEL/LXTR BRNCH Left 08/12/2016    Procedure: LEFT FEMORAL ARTERIOGRAM; BALLOON ANGIOPLASTY; SFA  AND FEMORAL AT VEIN GRAFT;  Surgeon: Nicholas Urena MD;  [facility];  Service: Vascular    TX TEAEC W/WO PATCH GRAFT COMMON FEMORAL Right 03/23/2023    Procedure: Common femoral endarterectomy&antegrade intervention of SFA, popliteal artery w/ shockwave;  Surgeon: Eagle Higuera MD;  [facility];  Service: Vascular    TX TRANSCATHETER TRANSAPICAL REPLACEMT AORTIC VALVE N/A 06/14/2022    Procedure: REPLACEMENT AORTIC VALVE TRANSCATHETER  "(TAVR) TRANSAPICAL 29MM IRVIN KYLER S3 ULTRA VALVE(ACCESS ON LEFT) ELANA;  Surgeon: Amarjit Gordon DO;  [facility];  Service: Cardiac Surgery    SKIN CANCER EXCISION      TONSILLECTOMY AND ADENOIDECTOMY      UPPER GASTROINTESTINAL ENDOSCOPY       Social History   Social History     Substance and Sexual Activity   Alcohol Use Not Currently    Comment: rare     Social History     Substance and Sexual Activity   Drug Use No     Social History     Tobacco Use   Smoking Status Every Day    Current packs/day: 1.00    Average packs/day: 0.6 packs/day for 100.0 years (62.5 ttl pk-yrs)    Types: Cigarettes    Passive exposure: Current   Smokeless Tobacco Never   Tobacco Comments    4 cigarettes per day     Family History   Problem Relation Age of Onset    Heart attack Father     Other Sister         bypass and vlave replacement    Stroke Paternal Uncle     Arrhythmia Neg Hx     Asthma Neg Hx     Clotting disorder Neg Hx     Fainting Neg Hx     Anuerysm Neg Hx     Hypertension Neg Hx         unsure     Hyperlipidemia Neg Hx     Heart failure Neg Hx        Meds/Allergies       Current Outpatient Medications:     allopurinol (ZYLOPRIM) 300 mg tablet    amLODIPine (NORVASC) 10 mg tablet    atorvastatin (LIPITOR) 80 mg tablet    bacitracin topical ointment 500 units/g topical ointment    calcitriol (ROCALTROL) 0.25 mcg capsule    cephalexin (KEFLEX) 250 mg capsule    ciprofloxacin (CIPRO) 500 mg tablet    clopidogrel (PLAVIX) 75 mg tablet    glimepiride (AMARYL) 1 mg tablet    hydrALAZINE (APRESOLINE) 25 mg tablet    lisinopril (ZESTRIL) 5 mg tablet    metoprolol succinate (TOPROL-XL) 25 mg 24 hr tablet    mupirocin (BACTROBAN) 2 % ointment    pancrelipase, Lip-Prot-Amyl, (CREON) 24,000 units    pantoprazole (PROTONIX) 40 mg tablet    torsemide (DEMADEX) 20 mg tablet    No Known Allergies        Objective     Blood pressure 157/56, pulse (!) 52, height 5' 10\" (1.778 m), weight 77.1 kg (170 lb). Body mass index is " 24.39 kg/m².      PHYSICAL EXAM:      Physical Exam     Lab Results:   No visits with results within 1 Day(s) from this visit.   Latest known visit with results is:   Office Visit on 09/04/2024   Component Date Value    Hemoglobin A1C 09/04/2024 7.3 (H)     EAG 09/04/2024 163          Radiology Results:   MRI brain w wo contrast    Result Date: 9/16/2024  Narrative: MRI BRAIN WITH AND WITHOUT CONTRAST INDICATION: C34.32: Malignant neoplasm of lower lobe, left bronchus or lung. COMPARISON: August 6, 2015 TECHNIQUE: Multiplanar, multisequence imaging of the brain was performed before and after gadolinium administration. IV Contrast:  3.5 mL of Gadobutrol injection (SINGLE-DOSE) IMAGE QUALITY:   Mild motion degradation. FINDINGS: BRAIN PARENCHYMA:  There is no discrete mass, mass effect or midline shift. There is no acute intracranial hemorrhage. Scattered foci of susceptibility left temporal lobe, right brachium pontis, and throughout the supratentorial parenchyma most consistent with sequelae of remote microhemorrhages. Normal posterior fossa.  Diffusion imaging is unremarkable. Small scattered hyperintensities on T2/FLAIR imaging are noted in the periventricular and subcortical white matter demonstrating an appearance that is statistically most likely to represent mild microangiopathic change. Postcontrast imaging of the brain demonstrates no abnormal enhancement. VENTRICLES:  Ventricles and extra-axial CSF spaces are prominent commensurate with the degree of volume loss.  No hydrocephalus.  No intraventricular hemorrhage. Unchanged lea cisterna magna. SELLA AND PITUITARY GLAND:  Normal. ORBITS:  Normal. PARANASAL SINUSES:  Normal. VASCULATURE:  Evaluation of the major intracranial vasculature demonstrates appropriate flow voids. CALVARIUM AND SKULL BASE:  Normal. EXTRACRANIAL SOFT TISSUES:  Normal.     Impression: No intracranial metastatic disease. No acute intracranial process. Mild chronic microangiopathic  changes. Workstation performed: [id number]

## 2024-09-20 ENCOUNTER — TELEMEDICINE (OUTPATIENT)
Dept: RADIATION ONCOLOGY | Facility: CLINIC | Age: 79
End: 2024-09-20
Attending: RADIOLOGY
Payer: MEDICARE

## 2024-09-20 DIAGNOSIS — C34.32 PRIMARY NON-SMALL CELL CARCINOMA OF LOWER LOBE OF LEFT LUNG (HCC): Primary | ICD-10-CM

## 2024-09-20 PROCEDURE — 99441 PR PHYS/QHP TELEPHONE EVALUATION 5-10 MIN: CPT | Performed by: RADIOLOGY

## 2024-09-20 NOTE — ASSESSMENT & PLAN NOTE
Jay Roach Jr. is an 79 y.o. 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  He recently completed definitive SBRT on 5/30/24.     He was seen in follow-up on 8/12/24 and noted persistent regular headaches.  Brain MRI was ordered, but delayed.  Eventually, MRI was completed.    Brain MRI 9/13/24 was benign without intracranial metastatic disease or acute intracranial process.      I reviewed imaging results with patient.  He continues to have mild to moderate frequent headaches, but they are not daily and resolve with time.  He feels he can tolerate symptoms.      He has surveillance chest CT imaging in November and then follow-up afterwards.

## 2024-09-20 NOTE — PROGRESS NOTES
Virtual Brief Visit  Name: Jay Roach Jr.      : 1945      MRN: [id number]  Encounter Provider: Lilia Herrera MD  Encounter Date: 2024   Encounter department: [facility]IATION ONCOLOGY    This Visit is being completed by telephone. The Patient is located at Home and in the following state in which I hold an active license [place]    The patient was identified by name and date of birth. Jay Roach Jr. was informed that this is a telemedicine visit and that the visit is being conducted through Telephone.  My office door was closed. No one else was in the room.  He acknowledged consent and understanding of privacy and security of the video platform. The patient has agreed to participate and understands they can discontinue the visit at any time.    Patient is aware this is a billable service.     Assessment & Plan  Primary non-small cell carcinoma of lower lobe of left lung (HCC)  Jay Roach Jr. is an 79 y.o. 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  He recently completed definitive SBRT on 24.     He was seen in follow-up on 24 and noted persistent regular headaches.  Brain MRI was ordered, but delayed.  Eventually, MRI was completed.    Brain MRI 24 was benign without intracranial metastatic disease or acute intracranial process.      I reviewed imaging results with patient.  He continues to have mild to moderate frequent headaches, but they are not daily and resolve with time.  He feels he can tolerate symptoms.      He has surveillance chest CT imaging in November and then follow-up afterwards.          Visit Time  Total Visit Duration: 10 minutes

## 2024-09-23 ENCOUNTER — CONSULT (OUTPATIENT)
Age: 79
End: 2024-09-23
Payer: MEDICARE

## 2024-09-23 ENCOUNTER — PROCEDURE VISIT (OUTPATIENT)
Dept: SURGERY | Facility: CLINIC | Age: 79
End: 2024-09-23
Payer: MEDICARE

## 2024-09-23 VITALS
WEIGHT: 176 LBS | HEIGHT: 70 IN | TEMPERATURE: 97.9 F | BODY MASS INDEX: 25.2 KG/M2 | DIASTOLIC BLOOD PRESSURE: 50 MMHG | SYSTOLIC BLOOD PRESSURE: 136 MMHG | OXYGEN SATURATION: 98 % | HEART RATE: 57 BPM

## 2024-09-23 VITALS
WEIGHT: 176 LBS | HEART RATE: 57 BPM | HEIGHT: 70 IN | BODY MASS INDEX: 25.2 KG/M2 | SYSTOLIC BLOOD PRESSURE: 136 MMHG | DIASTOLIC BLOOD PRESSURE: 50 MMHG | TEMPERATURE: 98 F

## 2024-09-23 DIAGNOSIS — L98.9 SKIN LESION OF LEFT LEG: ICD-10-CM

## 2024-09-23 DIAGNOSIS — L98.9 SKIN LESION OF LEFT LEG: Primary | ICD-10-CM

## 2024-09-23 PROCEDURE — 88305 TISSUE EXAM BY PATHOLOGIST: CPT | Performed by: PATHOLOGY

## 2024-09-23 PROCEDURE — 11602 EXC TR-EXT MAL+MARG 1.1-2 CM: CPT | Performed by: SURGERY

## 2024-09-23 PROCEDURE — 99202 OFFICE O/P NEW SF 15 MIN: CPT | Performed by: SURGERY

## 2024-09-23 PROCEDURE — 12031 INTMD RPR S/A/T/EXT 2.5 CM/<: CPT | Performed by: SURGERY

## 2024-09-23 RX ORDER — AMOXICILLIN 500 MG/1
CAPSULE ORAL
COMMUNITY
Start: 2024-09-12

## 2024-09-23 NOTE — PROGRESS NOTES
This is a procedure only    Skin excision    Date/Time: 9/23/2024 2:30 PM    Performed by: Robert Bloch, MD  Authorized by: Robert Bloch, MD  Universal Protocol:  Consent: Written consent obtained.  Consent given by: patient  Patient identity confirmed: verbally with patient    Procedure Details - Skin Excision:     Number of Lesions:  1  Lesion 1:     Body area:  Lower extremity    Lower extremity location:  L lower leg    Malignancy: malignancy unknown        Initial size (mm):  16        Final defect size (mm):  35    Destruction method: electrosurgery    Repair type:  Linear closure    Closure complexity: simple      Surgical defect:  3.5    Repair size (cm):  1.5     Repair Comments: Patient was identified by me and placed in the supine position on the operating room table.  The area was marked and prepped and draped in a normal surgical manner and an elliptical skin incision was now made with knife and Bovie to remove the lesion.  Lesion was marked in the 3 o'clock position.  I raised flaps as best as I could and then I closed with a couple sutures on either red leaving the midportion open.  This was packed and dressed and the patient was given instructions for care

## 2024-09-23 NOTE — PROGRESS NOTES
The patient is an 79-year-old white male who is sent in with a lesion on his left anterior lower leg.  There is no history of trauma here and this came up relatively quickly.  He was told by his primary that it is a acanthoma.  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  On examination this is slightly over a centimeter wide and irritated.  There is no fluid that I can feel although the area is slightly tender.  I do not see cellulitis.  The patient was actually in the [facility] and given antibiotics and this was switched to Cipro but the patient stopped taking.  This needs to be excised and I told him it can be benign or malignant.  I also told him that I could do this in the office under local but not in this office.  He is going to come down to the [place] office later today for excision.  Of some note is the fact that he is a vasculopath with lung cancer.  I am not stopping the Plavix

## 2024-09-23 NOTE — PATIENT INSTRUCTIONS
Keep pressure dressing on for 2 days  Remove dressing after 2 days and shower  Bacitracin in the wound followed by a bordered gauze  Return 2 weeks

## 2024-09-24 ENCOUNTER — TELEPHONE (OUTPATIENT)
Age: 79
End: 2024-09-24

## 2024-09-24 NOTE — TELEPHONE ENCOUNTER
Pt of Dr. Bloch s/p skin lesion left leg yesterday-  Pt calling in for instructions on wound care.   Advised pt of directions: Keep pressure dressing on for 2 days  Remove dressing after 2 days and shower  Bacitracin in the wound followed by a bordered gauze  Return 2 weeks    Pt asked if he should do anything to the sutures or try to remove them and I strongly advised not to remove any sutures.     Reiterated instructions and clarified he may remove the outer pressure bandage and shower with soap and water and then remove the dressing in the shower/after shower and pat dry. Apply the bacitracin and cover with gauze. Shower and change bandage daily.     No other questions at this time.

## 2024-09-26 PROCEDURE — 88305 TISSUE EXAM BY PATHOLOGIST: CPT | Performed by: PATHOLOGY

## 2024-10-06 ENCOUNTER — APPOINTMENT (EMERGENCY)
Dept: CT IMAGING | Facility: HOSPITAL | Age: 79
End: 2024-10-06
Payer: MEDICARE

## 2024-10-06 ENCOUNTER — HOSPITAL ENCOUNTER (EMERGENCY)
Facility: HOSPITAL | Age: 79
Discharge: HOME | End: 2024-10-06
Attending: EMERGENCY MEDICINE
Payer: MEDICARE

## 2024-10-06 VITALS
TEMPERATURE: 98.1 F | HEART RATE: 65 BPM | DIASTOLIC BLOOD PRESSURE: 70 MMHG | OXYGEN SATURATION: 99 % | RESPIRATION RATE: 17 BRPM | SYSTOLIC BLOOD PRESSURE: 160 MMHG

## 2024-10-06 DIAGNOSIS — N18.9 CKD (CHRONIC KIDNEY DISEASE): ICD-10-CM

## 2024-10-06 DIAGNOSIS — K92.1 MELENA: ICD-10-CM

## 2024-10-06 DIAGNOSIS — D64.9 CHRONIC ANEMIA: ICD-10-CM

## 2024-10-06 DIAGNOSIS — R03.0 ELEVATED BLOOD PRESSURE READING: ICD-10-CM

## 2024-10-06 DIAGNOSIS — K92.2 GI BLEED: Primary | ICD-10-CM

## 2024-10-06 LAB
ABO GROUP BLD: NORMAL
ALBUMIN SERPL BCG-MCNC: 3.9 G/DL (ref 3.5–5)
ALP SERPL-CCNC: 80 U/L (ref 34–104)
ALT SERPL W P-5'-P-CCNC: 7 U/L (ref 7–52)
ANION GAP SERPL CALCULATED.3IONS-SCNC: 8 MMOL/L (ref 4–13)
APTT PPP: 27 SECONDS (ref 23–34)
AST SERPL W P-5'-P-CCNC: 13 U/L (ref 13–39)
BACTERIA UR QL AUTO: ABNORMAL /HPF
BASOPHILS # BLD AUTO: 0.02 THOUSANDS/ΜL (ref 0–0.1)
BASOPHILS NFR BLD AUTO: 0 % (ref 0–1)
BILIRUB SERPL-MCNC: 0.45 MG/DL (ref 0.2–1)
BILIRUB UR QL STRIP: NEGATIVE
BLD GP AB SCN SERPL QL: NEGATIVE
BUN SERPL-MCNC: 39 MG/DL (ref 5–25)
CALCIUM SERPL-MCNC: 9.3 MG/DL (ref 8.4–10.2)
CHLORIDE SERPL-SCNC: 105 MMOL/L (ref 96–108)
CLARITY UR: CLEAR
CO2 SERPL-SCNC: 24 MMOL/L (ref 21–32)
COLOR UR: ABNORMAL
CREAT SERPL-MCNC: 2.22 MG/DL (ref 0.6–1.3)
EOSINOPHIL # BLD AUTO: 0.11 THOUSAND/ΜL (ref 0–0.61)
EOSINOPHIL NFR BLD AUTO: 2 % (ref 0–6)
ERYTHROCYTE [DISTWIDTH] IN BLOOD BY AUTOMATED COUNT: 16.1 % (ref 11.6–15.1)
EXT FECAL OCCULT BLOOD SCREEN: POSITIVE
EXT. CONTROL: ABNORMAL
GFR SERPL CREATININE-BSD FRML MDRD: 26 ML/MIN/1.73SQ M
GLUCOSE SERPL-MCNC: 183 MG/DL (ref 65–140)
GLUCOSE UR STRIP-MCNC: NEGATIVE MG/DL
HCT VFR BLD AUTO: 33 % (ref 36.5–49.3)
HGB BLD-MCNC: 10.7 G/DL (ref 12–17)
HGB UR QL STRIP.AUTO: NEGATIVE
HYALINE CASTS #/AREA URNS LPF: ABNORMAL /LPF
IMM GRANULOCYTES # BLD AUTO: 0.02 THOUSAND/UL (ref 0–0.2)
IMM GRANULOCYTES NFR BLD AUTO: 0 % (ref 0–2)
INR PPP: 0.97 (ref 0.85–1.19)
KETONES UR STRIP-MCNC: NEGATIVE MG/DL
LEUKOCYTE ESTERASE UR QL STRIP: NEGATIVE
LYMPHOCYTES # BLD AUTO: 0.73 THOUSANDS/ΜL (ref 0.6–4.47)
LYMPHOCYTES NFR BLD AUTO: 11 % (ref 14–44)
MCH RBC QN AUTO: 30.9 PG (ref 26.8–34.3)
MCHC RBC AUTO-ENTMCNC: 32.4 G/DL (ref 31.4–37.4)
MCV RBC AUTO: 95 FL (ref 82–98)
MONOCYTES # BLD AUTO: 0.4 THOUSAND/ΜL (ref 0.17–1.22)
MONOCYTES NFR BLD AUTO: 6 % (ref 4–12)
MUCOUS THREADS UR QL AUTO: ABNORMAL
NEUTROPHILS # BLD AUTO: 5.61 THOUSANDS/ΜL (ref 1.85–7.62)
NEUTS SEG NFR BLD AUTO: 81 % (ref 43–75)
NITRITE UR QL STRIP: NEGATIVE
NON-SQ EPI CELLS URNS QL MICRO: ABNORMAL /HPF
NRBC BLD AUTO-RTO: 0 /100 WBCS
PH UR STRIP.AUTO: 5.5 [PH]
PLATELET # BLD AUTO: 151 THOUSANDS/UL (ref 149–390)
PMV BLD AUTO: 10.5 FL (ref 8.9–12.7)
POTASSIUM SERPL-SCNC: 4.2 MMOL/L (ref 3.5–5.3)
PROT SERPL-MCNC: 6.9 G/DL (ref 6.4–8.4)
PROT UR STRIP-MCNC: ABNORMAL MG/DL
PROTHROMBIN TIME: 13.6 SECONDS (ref 12.3–15)
RBC # BLD AUTO: 3.46 MILLION/UL (ref 3.88–5.62)
RBC #/AREA URNS AUTO: ABNORMAL /HPF
RH BLD: POSITIVE
SODIUM SERPL-SCNC: 137 MMOL/L (ref 135–147)
SP GR UR STRIP.AUTO: 1.01 (ref 1–1.03)
SPECIMEN EXPIRATION DATE: NORMAL
UROBILINOGEN UR STRIP-ACNC: <2 MG/DL
WBC # BLD AUTO: 6.89 THOUSAND/UL (ref 4.31–10.16)
WBC #/AREA URNS AUTO: ABNORMAL /HPF

## 2024-10-06 PROCEDURE — 99285 EMERGENCY DEPT VISIT HI MDM: CPT

## 2024-10-06 PROCEDURE — 99285 EMERGENCY DEPT VISIT HI MDM: CPT | Performed by: EMERGENCY MEDICINE

## 2024-10-06 PROCEDURE — 80053 COMPREHEN METABOLIC PANEL: CPT

## 2024-10-06 PROCEDURE — 85610 PROTHROMBIN TIME: CPT

## 2024-10-06 PROCEDURE — 86901 BLOOD TYPING SEROLOGIC RH(D): CPT

## 2024-10-06 PROCEDURE — 85025 COMPLETE CBC W/AUTO DIFF WBC: CPT

## 2024-10-06 PROCEDURE — 74176 CT ABD & PELVIS W/O CONTRAST: CPT

## 2024-10-06 PROCEDURE — 36415 COLL VENOUS BLD VENIPUNCTURE: CPT

## 2024-10-06 PROCEDURE — 86850 RBC ANTIBODY SCREEN: CPT

## 2024-10-06 PROCEDURE — 85730 THROMBOPLASTIN TIME PARTIAL: CPT

## 2024-10-06 PROCEDURE — 81001 URINALYSIS AUTO W/SCOPE: CPT | Performed by: EMERGENCY MEDICINE

## 2024-10-06 PROCEDURE — 86900 BLOOD TYPING SEROLOGIC ABO: CPT

## 2024-10-06 NOTE — ED ATTENDING ATTESTATION
10/6/2024  I, Carlos Alfaro DO, saw and evaluated the patient. I have discussed the patient with the resident/non-physician practitioner and agree with the resident's/non-physician practitioner's findings, Plan of Care, and MDM as documented in the resident's/non-physician practitioner's note, except where noted. All available labs and Radiology studies were reviewed.  I was present for key portions of any procedure(s) performed by the resident/non-physician practitioner and I was immediately available to provide assistance.       At this point I agree with the current assessment done in the Emergency Department.  I have conducted an independent evaluation of this patient a history and physical is as follows:            1. GI bleed    2. Chronic anemia    3. CKD (chronic kidney disease)              Time reflects when diagnosis was documented in both MDM as applicable and the Disposition within this note       Time User Action Codes Description Comment    10/6/2024  4:15 PM Patricia León Add [K92.2] GI bleed     10/6/2024  4:15 PM Patricia León Add [D64.9] Chronic anemia     10/6/2024  4:15 PM LeónTex whippleve Add [N18.9] CKD (chronic kidney disease)           ED Disposition       ED Disposition   Discharge    Condition   Stable    Date/Time   Sun Oct 6, 2024  4:14 PM    Comment   Jay Roach Jr. discharge to home/self care.                   Follow-up Information       Follow up With Specialties Details Why Contact Info    Amarjit Summers MD Gastroenterology   [address] [place] [address] Dr Santo MOREJON [address] [phone]                                Chief Complaint   Patient presents with    Black or Bloody Stool     Pt presenting with black tarry stool x2 days with abdominal pain. Pt takes plavix daily. Hx of GI bleeds.                Black or Bloody Stool          79-year-old male, generalized weakness, dark stools, he is on Plavix, history of GI bleeds in the past.,  Some lower abdominal pain  mainly  suprapubic but also left lower quadrant.  No shortness of breath no chest pain          Physical Exam  Vitals reviewed.   Constitutional:       General: He is not in acute distress.     Appearance: He is well-developed. He is not diaphoretic.   HENT:      Head: Normocephalic and atraumatic.      Right Ear: External ear normal.      Left Ear: External ear normal.      Nose: Nose normal.   Eyes:      General:         Right eye: No discharge.         Left eye: No discharge.      Pupils: Pupils are equal, round, and reactive to light.   Neck:      Trachea: No tracheal deviation.   Cardiovascular:      Rate and Rhythm: Normal rate and regular rhythm.      Heart sounds: Normal heart sounds. No murmur heard.  Pulmonary:      Effort: Pulmonary effort is normal. No respiratory distress.      Breath sounds: Normal breath sounds. No stridor.   Abdominal:      General: There is no distension.      Palpations: Abdomen is soft.      Tenderness: There is abdominal tenderness (suprapubic and LLQ). There is no guarding or rebound.   Musculoskeletal:         General: Normal range of motion.      Cervical back: Normal range of motion and neck supple.   Skin:     General: Skin is warm and dry.      Coloration: Skin is not pale.      Findings: No erythema.   Neurological:      General: No focal deficit present.      Mental Status: He is alert and oriented to person, place, and time.               Medications - No data to display          Labs Reviewed   CBC AND DIFFERENTIAL - Abnormal       Result Value Ref Range Status    WBC 6.89  4.31 - 10.16 Thousand/uL Final    RBC 3.46 (*) 3.88 - 5.62 Million/uL Final    Hemoglobin 10.7 (*) 12.0 - 17.0 g/dL Final    Hematocrit 33.0 (*) 36.5 - 49.3 % Final    MCV 95  82 - 98 fL Final    MCH 30.9  26.8 - 34.3 pg Final    MCHC 32.4  31.4 - 37.4 g/dL Final    RDW 16.1 (*) 11.6 - 15.1 % Final    MPV 10.5  8.9 - 12.7 fL Final    Platelets 151  149 - 390 Thousands/uL Final    nRBC 0  /100 WBCs Final     Segmented % 81 (*) 43 - 75 % Final    Immature Grans % 0  0 - 2 % Final    Lymphocytes % 11 (*) 14 - 44 % Final    Monocytes % 6  4 - 12 % Final    Eosinophils Relative 2  0 - 6 % Final    Basophils Relative 0  0 - 1 % Final    Absolute Neutrophils 5.61  1.85 - 7.62 Thousands/µL Final    Absolute Immature Grans 0.02  0.00 - 0.20 Thousand/uL Final    Absolute Lymphocytes 0.73  0.60 - 4.47 Thousands/µL Final    Absolute Monocytes 0.40  0.17 - 1.22 Thousand/µL Final    Eosinophils Absolute 0.11  0.00 - 0.61 Thousand/µL Final    Basophils Absolute 0.02  0.00 - 0.10 Thousands/µL Final   COMPREHENSIVE METABOLIC PANEL - Abnormal    Sodium 137  135 - 147 mmol/L Final    Potassium 4.2  3.5 - 5.3 mmol/L Final    Chloride 105  96 - 108 mmol/L Final    CO2 24  21 - 32 mmol/L Final    ANION GAP 8  4 - 13 mmol/L Final    BUN 39 (*) 5 - 25 mg/dL Final    Creatinine 2.22 (*) 0.60 - 1.30 mg/dL Final    Comment: Standardized to IDMS reference method    Glucose 183 (*) 65 - 140 mg/dL Final    Comment: If the patient is fasting, the ADA then defines impaired fasting glucose as > 100 mg/dL and diabetes as > or equal to 123 mg/dL.    Calcium 9.3  8.4 - 10.2 mg/dL Final    AST 13  13 - 39 U/L Final    ALT 7  7 - 52 U/L Final    Comment: Specimen collection should occur prior to Sulfasalazine administration due to the potential for falsely depressed results.     Alkaline Phosphatase 80  34 - 104 U/L Final    Total Protein 6.9  6.4 - 8.4 g/dL Final    Albumin 3.9  3.5 - 5.0 g/dL Final    Total Bilirubin 0.45  0.20 - 1.00 mg/dL Final    Comment: Use of this assay is not recommended for patients undergoing treatment with eltrombopag due to the potential for falsely elevated results.  N-acetyl-p-benzoquinone imine (metabolite of Acetaminophen) will generate erroneously low results in samples for patients that have taken an overdose of Acetaminophen.    eGFR 26  ml/min/1.73sq m Final    Narrative:     [employer]  guidelines for Chronic Kidney Disease (CKD):     Stage 1 with normal or high GFR (GFR > 90 mL/min/1.73 square meters)    Stage 2 Mild CKD (GFR = 60-89 mL/min/1.73 square meters)    Stage 3A Moderate CKD (GFR = 45-59 mL/min/1.73 square meters)    Stage 3B Moderate CKD (GFR = 30-44 mL/min/1.73 square meters)    Stage 4 Severe CKD (GFR = 15-29 mL/min/1.73 square meters)    Stage 5 End Stage CKD (GFR <15 mL/min/1.73 square meters)  Note: GFR calculation is accurate only with a steady state creatinine   UA W REFLEX TO MICROSCOPIC WITH REFLEX TO CULTURE - Abnormal    Color, UA Light Yellow   Final    Clarity, UA Clear   Final    Specific Gravity, UA 1.010  1.003 - 1.030 Final    pH, UA 5.5  4.5, 5.0, 5.5, 6.0, 6.5, 7.0, 7.5, 8.0 Final    Leukocytes, UA Negative  Negative Final    Nitrite, UA Negative  Negative Final    Protein, UA Trace (*) Negative mg/dl Final    Glucose, UA Negative  Negative mg/dl Final    Ketones, UA Negative  Negative mg/dl Final    Urobilinogen, UA <2.0  <2.0 mg/dl mg/dl Final    Bilirubin, UA Negative  Negative Final    Occult Blood, UA Negative  Negative Final   URINE MICROSCOPIC - Abnormal    RBC, UA 1-2  None Seen, 1-2 /hpf Final    WBC, UA 1-2  None Seen, 1-2 /hpf Final    Epithelial Cells None Seen  None Seen, Occasional /hpf Final    Bacteria, UA None Seen  None Seen, Occasional /hpf Final    MUCUS THREADS Occasional (*) None Seen Final    Hyaline Casts, UA 3-5 (*) None Seen /lpf Final   POCT OCCULT BLOOD STOOL - Abnormal    EXT Fecal Occult Blood Positive (*) Negative     Control Valid  Valid    PROTIME-INR - Normal    Protime 13.6  12.3 - 15.0 seconds Final    INR 0.97  0.85 - 1.19 Final    Narrative:     INR Therapeutic Range    Indication                                             INR Range      Atrial Fibrillation                                               2.0-3.0  Hypercoagulable State                                    2.0.2.3  Left Ventricular Asist Device                             2.0-3.0  Mechanical Heart Valve                                  -    Aortic(with afib, MI, embolism, HF, LA enlargement,    and/or coagulopathy)                                     2.0-3.0 (2.5-3.5)     Mitral                                                             2.5-3.5  Prosthetic/Bioprosthetic Heart Valve               2.0-3.0  Venous thromboembolism (VTE: VT, PE        2.0-3.0   APTT - Normal    PTT 27  23 - 34 seconds Final    Comment: Therapeutic Heparin Range =  60-90 seconds   TYPE AND SCREEN    ABO Grouping A   Final    Rh Factor Positive   Final    Antibody Screen Negative   Final    Specimen Expiration Date 20241009   Final         CT abdomen pelvis wo contrast   Final Result      Colonic diverticulosis without diverticulitis.      Diffuse urinary bladder wall thickening. Please correlate with urinalysis.      Again seen is sequela of chronic pancreatitis. A 1.9 cm hyperdense lesion in the pancreatic head again noted, stable over several years therefore likely benign (series 301 image 49.)         Workstation performed: [id number]                      Procedures                            MDM  Number of Diagnoses or Management Options  Chronic anemia  CKD (chronic kidney disease)  GI bleed  Diagnosis management comments:       Initial ED assessment:   79-year-old male left lower quadrant abdominal pain, suprapubic pain, darker stools, on Plavix    Pathology at risk for includes but is not limited to:   GI bleed, upper versus lower, diverticulitis, colitis, mesenteric adenitis, intra-abdominal      Initial ED plan:   Blood work, CT scan, ultimate disposition pending the workup        Final ED summary/disposition:   After evaluation and workup in the emergency department, CT negative, long conversation with patient, does not want to be admitted to the hospital, no indication at this time, will discharge

## 2024-10-06 NOTE — ED PROVIDER NOTES
Final diagnoses:   GI bleed   Chronic anemia   CKD (chronic kidney disease)   Elevated blood pressure reading   Melena     ED Disposition       ED Disposition   Discharge    Condition   Stable    Date/Time   Sun Oct 6, 2024  4:14 PM    Comment   Jay Roach Jr. discharge to home/self care.                   Assessment & Plan       Medical Decision Making  Amount and/or Complexity of Data Reviewed  External Data Reviewed: labs, radiology and notes.  Labs: ordered. Decision-making details documented in ED Course.  Radiology: ordered. Decision-making details documented in ED Course.    Risk  Prescription drug management.      Jay Roach Jr. is a 79 y.o. male presenting with black tarry stools x2 days, suprapubic abdominal pain. Vitals grossly at patient baseline, no tachycardia. Exam remarkable for tenderness to palpation over suprapubic region. Rectal exam without external hemorrhoids, no fissure, hemoccult positive. Otherwise exam grossly unremarkable.    Differential diagnosis including but not limited to: upper GI bleed, gastritis, PUD, diverticulosis, diverticulitis, colitis, anemia, coagulopathy, liver disease, tumor, anal fissure, medication effect.    See ED course for plan further updates and interpretation of results.    Based on these results and H&P, suspect chronic slow upper GI, likely from PUD. Patient's hemoglobin is stable from recent lab work, there was improvement of the tarry stools, CT without acute findings, he has good follow-up with GI, he has not had any active BRBPR while in the ED. Had extensive shared decision making conversation with patient, and patient expressed preference for discharge with close follow-up and return if symptoms worsen. As he has been asymptomatic here, has been hemodynamically stable, and has good follow-up, this is reasonable. He is stable for discharge.    Results, clinical impressions, and plan were discussed with patient. They expressed understanding  and were in agreement with plan. Patient was given the opportunity to ask questions in ED. All questions and concerns were addressed in ED.    After evaluation and workup in the emergency department Patient appears well, is nontoxic appearing, expresses understanding and agrees with plan of care at this time.  In light of this patient would benefit from outpatient management. Discussed strict return precautions.  Discussed importance of following up with PCP in the next few days.  All questions answered.  Patient is agreeable to discharge.    ED Course as of 10/07/24 0238   Sun Oct 06, 2024   1413 2d of black stools.Started with left lower quadrant abdominal pain. Also now loose stools    Considering GI bleed vs diverticulitis vs colitis vs medication changes    Plan:  - Will examine CBC to evaluate for hematologic abnormalities and infection  - Will examine CMP to evaluate for metabolic abnormalities  - Will get coags as patient is on anticoagulation and history of melena  - Will get T&S in preparation for possible transfusion  - If hemoglobin is <7, will transfuse  - Will get CT abdomen to investigate for diverticulitis or colitis    Will continue to monitor while patient is in the ED and reconsider further evaluation or intervention as needed.   1451 Hemoglobin(!): 10.7  Slightly decreased from previous but not acutely worse than baseline   1452 WBC: 6.89  No leukocytosis   1454 Platelet Count: 151  Normal   1505 Comprehensive metabolic panel(!)  normal electrolytes, elevated but grossly baseline kidney function, elevated glucose, normal liver function   1505 Protime-INR  Normal   1505 PTT: 27  Normal   1546 Pending CT read   1551 Type and screen  A positive   1551 Re-evaluated  Ambulating  Discussed available results   1609 Blood, UA: Negative   1609 Leukocytes, UA: Negative   1609 Nitrite, UA: Negative  Will see microscope but currently no signs of UTI   1609 Pending CT read   1614 CT abdomen pelvis wo  contrast  IMPRESSION:     Colonic diverticulosis without diverticulitis.     Diffuse urinary bladder wall thickening. Please correlate with urinalysis.     Again seen is sequela of chronic pancreatitis. A 1.9 cm hyperdense lesion in the pancreatic head again noted, stable over several years therefore likely benign (series 301 image 49.)   1627 Bacteria, UA: None Seen  Unlikely to be UTI at this time   1628 Re-evaluated  Feels well  No bloody bowel movements in ED  Discussed all results  Discussed follow-up with GI  Will discharge with close PCP follow-up and strict return precautions.       Medications - No data to display    ED Risk Strat Scores                           SBIRT 20yo+      Flowsheet Row Most Recent Value   Initial Alcohol Screen: US AUDIT-C     1. How often do you have a drink containing alcohol? 0 Filed at: 10/06/2024 1639   2. How many drinks containing alcohol do you have on a typical day you are drinking?  0 Filed at: 10/06/2024 1639   3b. FEMALE Any Age, or MALE 65+: How often do you have 4 or more drinks on one occassion? 0 Filed at: 10/06/2024 1639   Audit-C Score 0 Filed at: 10/06/2024 1639   MERRILL: How many times in the past year have you...    Used an illegal drug or used a prescription medication for non-medical reasons? Never Filed at: 10/06/2024 1639                            History of Present Illness       Chief Complaint   Patient presents with    Black or Bloody Stool     Pt presenting with black tarry stool x2 days with abdominal pain. Pt takes plavix daily. Hx of GI bleeds.        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 (HCC) 02/24/2023    Basal cell carcinoma     right cheek    CAD (coronary artery disease)     Carotid stenosis, asymptomatic, bilateral     Chronic kidney disease     Colon  polyp     Dependence on respirator (ventilator) status ([facility]) 04/07/2023    Diabetes ([facility])     type 2, non-insulin dependent    Diabetes mellitus ([facility])     GERD (gastroesophageal reflux disease)     History of nephrolithiasis     Hyperlipidemia     Hypertension     Left foot pain 11/30/2022    Lung cancer ([facility])     PAD (peripheral artery disease) ([facility])     Severe aortic stenosis     Squamous cell skin cancer 11/22/2022    left superior helix      Past Surgical History:   Procedure Laterality Date    APPENDECTOMY      CARDIAC CATHETERIZATION N/A 05/05/2022    Procedure: CARDIAC RHC/LHC;  Surgeon: Arvin Sanchez DO;  Location: [facility];  Service: Cardiology    CARDIAC CATHETERIZATION N/A 05/05/2022    Procedure: Cardiac Coronary Angiogram;  Surgeon: Arvin Sanchez DO;  [facility]: [facility];  Service: Cardiology    CARDIAC CATHETERIZATION N/A 05/24/2022    Procedure: Cardiac pci;  Surgeon: Damien Jeter MD;  [facility]: [facility];  Service: Cardiology    CARDIAC CATHETERIZATION N/A 06/14/2022    Procedure: CARDIAC TAVR;  Surgeon: Nahum Vaz MD;  Location: [facility];  Service: Cardiology    COLECTOMY      COLONOSCOPY  2013    CORONARY ARTERY BYPASS GRAFT  2013    X 2    FEMORAL ARTERY - POPLITEAL ARTERY BYPASS GRAFT      HEMORRHOID SURGERY      IR AORTAGRAM WITH RUN-OFF  11/19/2018    IR AORTAGRAM WITH RUN-OFF  03/02/2023    IR BIOPSY LUNG  4/17/2024    IR LOWER EXTREMITY ANGIOGRAM  03/23/2023    MOHS SURGERY Left 01/11/2023    SCC left superior helix-Dr Tovar    NM SLCTV CATHJ 3RD+ ORD SLCTV ABDL PEL/LXTR BRNCH Left 08/12/2016    Procedure: LEFT FEMORAL ARTERIOGRAM; BALLOON ANGIOPLASTY; SFA  AND FEMORAL AT VEIN GRAFT;  Surgeon: Nicholas Urena MD;  Location: [facility];  Service: Vascular    NM TEAEC W/WO PATCH GRAFT COMMON FEMORAL Right 03/23/2023    Procedure: Common femoral endarterectomy&antegrade intervention of SFA, popliteal artery w/ shockwave;  Surgeon: Eagle  Michel Higuera MD;  Location: [facility];  Service: Vascular    FL TRANSCATHETER TRANSAPICAL REPLACEMT AORTIC VALVE N/A 06/14/2022    Procedure: REPLACEMENT AORTIC VALVE TRANSCATHETER (TAVR) TRANSAPICAL 29MM IRVIN KYLER S3 ULTRA VALVE(ACCESS ON LEFT) ELANA;  Surgeon: Amarjit Gordon DO;  Location:  MAIN OR;  Service: Cardiac Surgery    SKIN CANCER EXCISION      TONSILLECTOMY AND ADENOIDECTOMY      UPPER GASTROINTESTINAL ENDOSCOPY        Family History   Problem Relation Age of Onset    Heart attack Father     Other Sister         bypass and vlave replacement    Stroke Paternal Uncle     Arrhythmia Neg Hx     Asthma Neg Hx     Clotting disorder Neg Hx     Fainting Neg Hx     Anuerysm Neg Hx     Hypertension Neg Hx         unsure     Hyperlipidemia Neg Hx     Heart failure Neg Hx       Social History     Tobacco Use    Smoking status: Every Day     Current packs/day: 1.00     Average packs/day: 0.6 packs/day for 100.0 years (62.5 ttl pk-yrs)     Types: Cigarettes     Passive exposure: Current    Smokeless tobacco: Never    Tobacco comments:     4 cigarettes per day   Vaping Use    Vaping status: Never Used   Substance Use Topics    Alcohol use: Not Currently     Comment: rare    Drug use: No      E-Cigarette/Vaping    E-Cigarette Use Never User       E-Cigarette/Vaping Substances    Nicotine No     THC No     CBD No     Flavoring No     Other No     Unknown No       I have reviewed and agree with the history as documented.     HPI    Jay Roach Jr. is a 79 y.o. male with history of GI bleeds, CABG, CAD, PAD, T2DM, CKD, GERD, history of PUD, dCHF, s/p TAVR, HTN, chronic anemia, non-small cell carcinoma of the LLL presenting for dark stools.    Patient reports 2 days of black tarry stool that has improved as of today though stool today was looser but not watery. No bright red blood. He also reports that since 2 days ago he has had suprapubic primarily left-sided tenderness. Reports he was recently decreased  on his Protonix to once a day, and had been taking iron pills once every other day due to constipation while on the pills. No other recent medication changes. Denies nausea, vomiting, chest pain, shortness of breath, dizziness, lightheadedness, dysuria, frequency, urgency, hematuria, fevers, chills.    He presented because he has history of GI bleeds in the past and wanted to make sure his hemoglobin was ok given black stools, reports he would have gone to out patient labs but it is the weekend.    Review of Systems   Constitutional:  Negative for chills and fever.   HENT:  Negative for congestion, rhinorrhea and sore throat.    Eyes:  Negative for pain and visual disturbance.   Respiratory:  Negative for cough, chest tightness, shortness of breath, wheezing and stridor.    Cardiovascular:  Negative for chest pain, palpitations and leg swelling.   Gastrointestinal:  Positive for blood in stool (Dark stools). Negative for abdominal pain, constipation, diarrhea (Loose not watery stools), nausea, rectal pain and vomiting.   Genitourinary:  Negative for dysuria and hematuria.   Musculoskeletal:  Negative for arthralgias.   Skin:  Negative for color change, pallor and rash.   Neurological:  Negative for dizziness, syncope, light-headedness, numbness and headaches.   Psychiatric/Behavioral:  Negative for behavioral problems.    All other systems reviewed and are negative.          Objective       ED Triage Vitals [10/06/24 1357]   Temperature Pulse Blood Pressure Respirations SpO2 Patient Position - Orthostatic VS   98.1 °F (36.7 °C) 62 (!) 187/71 18 99 % --      Temp src Heart Rate Source BP Location FiO2 (%) Pain Score    -- Monitor Right arm -- --      Vitals      Date and Time Temp Pulse SpO2 Resp BP Pain Score FACES Pain Rating User   10/06/24 1500 -- 65 99 % 17 160/70 -- -- SG   10/06/24 1357 98.1 °F (36.7 °C) 62 99 % 18 187/71 -- -- ZC            Physical Exam  Vitals and nursing note reviewed.   Constitutional:        Appearance: Normal appearance. He is well-developed. He is not toxic-appearing.   HENT:      Head: Normocephalic and atraumatic.      Nose: No rhinorrhea.      Mouth/Throat:      Mouth: Mucous membranes are moist.      Pharynx: Oropharynx is clear.   Eyes:      Extraocular Movements: Extraocular movements intact.      Conjunctiva/sclera: Conjunctivae normal.      Pupils: Pupils are equal, round, and reactive to light.   Cardiovascular:      Rate and Rhythm: Normal rate and regular rhythm.      Pulses: Normal pulses.      Heart sounds: Normal heart sounds.   Pulmonary:      Effort: Pulmonary effort is normal. No respiratory distress.      Breath sounds: Normal breath sounds. No wheezing, rhonchi or rales.   Abdominal:      General: Abdomen is flat. Bowel sounds are normal. There is no distension.      Palpations: Abdomen is soft.      Tenderness: There is abdominal tenderness (Especially LLQ) in the right lower quadrant, suprapubic area and left lower quadrant. There is no right CVA tenderness, left CVA tenderness, guarding or rebound.   Genitourinary:     Rectum: Guaiac result positive. No tenderness, anal fissure or external hemorrhoid.   Musculoskeletal:      Cervical back: Neck supple.   Skin:     General: Skin is warm and dry.      Capillary Refill: Capillary refill takes less than 2 seconds.   Neurological:      General: No focal deficit present.      Mental Status: He is alert and oriented to person, place, and time.   Psychiatric:         Mood and Affect: Mood normal.         Behavior: Behavior normal.         Results Reviewed       Procedure Component Value Units Date/Time    Urine Microscopic [[id number]]  (Abnormal) Collected: 10/06/24 1554    Lab Status: Final result Specimen: Urine, Clean Catch Updated: 10/06/24 1620     RBC, UA 1-2 /hpf      WBC, UA 1-2 /hpf      Epithelial Cells None Seen /hpf      Bacteria, UA None Seen /hpf      MUCUS THREADS Occasional     Hyaline Casts, UA 3-5 /lpf     UA w  Reflex to Microscopic w Reflex to Culture [[id number]]  (Abnormal) Collected: 10/06/24 1554    Lab Status: Final result Specimen: Urine, Clean Catch Updated: 10/06/24 1608     Color, UA Light Yellow     Clarity, UA Clear     Specific Gravity, UA 1.010     pH, UA 5.5     Leukocytes, UA Negative     Nitrite, UA Negative     Protein, UA Trace mg/dl      Glucose, UA Negative mg/dl      Ketones, UA Negative mg/dl      Urobilinogen, UA <2.0 mg/dl      Bilirubin, UA Negative     Occult Blood, UA Negative    Comprehensive metabolic panel [[id number]]  (Abnormal) Collected: 10/06/24 1439    Lab Status: Final result Specimen: Blood from Arm, Left Updated: 10/06/24 1459     Sodium 137 mmol/L      Potassium 4.2 mmol/L      Chloride 105 mmol/L      CO2 24 mmol/L      ANION GAP 8 mmol/L      BUN 39 mg/dL      Creatinine 2.22 mg/dL      Glucose 183 mg/dL      Calcium 9.3 mg/dL      AST 13 U/L      ALT 7 U/L      Alkaline Phosphatase 80 U/L      Total Protein 6.9 g/dL      Albumin 3.9 g/dL      Total Bilirubin 0.45 mg/dL      eGFR 26 ml/min/1.73sq m     Narrative:      National Kidney Disease Foundation guidelines for Chronic Kidney Disease (CKD):     Stage 1 with normal or high GFR (GFR > 90 mL/min/1.73 square meters)    Stage 2 Mild CKD (GFR = 60-89 mL/min/1.73 square meters)    Stage 3A Moderate CKD (GFR = 45-59 mL/min/1.73 square meters)    Stage 3B Moderate CKD (GFR = 30-44 mL/min/1.73 square meters)    Stage 4 Severe CKD (GFR = 15-29 mL/min/1.73 square meters)    Stage 5 End Stage CKD (GFR <15 mL/min/1.73 square meters)  Note: GFR calculation is accurate only with a steady state creatinine    Protime-INR [[id number]]  (Normal) Collected: 10/06/24 1439    Lab Status: Final result Specimen: Blood from Arm, Left Updated: 10/06/24 1458     Protime 13.6 seconds      INR 0.97    Narrative:      INR Therapeutic Range    Indication                                             INR Range      Atrial Fibrillation                                                2.0-3.0  Hypercoagulable State                                    2.0.2.3  Left Ventricular Asist Device                            2.0-3.0  Mechanical Heart Valve                                  -    Aortic(with afib, MI, embolism, HF, LA enlargement,    and/or coagulopathy)                                     2.0-3.0 (2.5-3.5)     Mitral                                                             2.5-3.5  Prosthetic/Bioprosthetic Heart Valve               2.0-3.0  Venous thromboembolism (VTE: VT, PE        2.0-3.0    APTT [[id number]]  (Normal) Collected: 10/06/24 1439    Lab Status: Final result Specimen: Blood from Arm, Left Updated: 10/06/24 1458     PTT 27 seconds     CBC and differential [[id number]]  (Abnormal) Collected: 10/06/24 1439    Lab Status: Final result Specimen: Blood from Arm, Left Updated: 10/06/24 1450     WBC 6.89 Thousand/uL      RBC 3.46 Million/uL      Hemoglobin 10.7 g/dL      Hematocrit 33.0 %      MCV 95 fL      MCH 30.9 pg      MCHC 32.4 g/dL      RDW 16.1 %      MPV 10.5 fL      Platelets 151 Thousands/uL      nRBC 0 /100 WBCs      Segmented % 81 %      Immature Grans % 0 %      Lymphocytes % 11 %      Monocytes % 6 %      Eosinophils Relative 2 %      Basophils Relative 0 %      Absolute Neutrophils 5.61 Thousands/µL      Absolute Immature Grans 0.02 Thousand/uL      Absolute Lymphocytes 0.73 Thousands/µL      Absolute Monocytes 0.40 Thousand/µL      Eosinophils Absolute 0.11 Thousand/µL      Basophils Absolute 0.02 Thousands/µL     POCT occult blood stool [[id number]]  (Abnormal) Collected: 10/06/24 1434    Lab Status: In process Specimen: Stool Updated: 10/06/24 1434     EXT Fecal Occult Blood Positive     Control Valid            CT abdomen pelvis wo contrast   Final Interpretation by Bang Mckay MD (10/06 1612)      Colonic diverticulosis without diverticulitis.      Diffuse urinary bladder wall thickening. Please correlate with urinalysis.      Again seen is  sequela of chronic pancreatitis. A 1.9 cm hyperdense lesion in the pancreatic head again noted, stable over several years therefore likely benign (series 301 image 49.)         Workstation performed: [id number]             Procedures    ED Medication and Procedure Management   Prior to Admission Medications   Prescriptions Last Dose Informant Patient Reported? Taking?   allopurinol (ZYLOPRIM) 300 mg tablet  Self No No   Sig: TAKE 1 TABLET DAILY   amLODIPine (NORVASC) 10 mg tablet  Self No No   Sig: TAKE 1 TABLET DAILY   amoxicillin (AMOXIL) 500 mg capsule   Yes No   Sig: TAKE 4 CAPSULES BY MOUTH 1 HOUR PRIOR TO ORAL SURGERY APPOINTMENT   Patient not taking: Reported on 9/23/2024   atorvastatin (LIPITOR) 80 mg tablet  Self No No   Sig: TAKE 1 TABLET DAILY AT     BEDTIME   bacitracin topical ointment 500 units/g topical ointment  Self No No   Sig: Apply 1 large application topically 2 (two) times a day for 7 days   calcitriol (ROCALTROL) 0.25 mcg capsule  Self No No   Sig: Take 1 capsule (0.25 mcg total) by mouth daily   clopidogrel (PLAVIX) 75 mg tablet  Self No No   Sig: Take 1 tablet (75 mg total) by mouth daily   glimepiride (AMARYL) 1 mg tablet  Self No No   Sig: Take 1 tablet (1 mg total) by mouth daily with breakfast   hydrALAZINE (APRESOLINE) 25 mg tablet  Self No No   Sig: Take 1 tablet (25 mg total) by mouth 2 (two) times a day   lisinopril (ZESTRIL) 5 mg tablet  Self No No   Sig: Take 1 tablet (5 mg total) by mouth daily   metoprolol succinate (TOPROL-XL) 25 mg 24 hr tablet  Self No No   Sig: Take 0.5 tablets (12.5 mg total) by mouth daily   mupirocin (BACTROBAN) 2 % ointment  Self No No   Sig: Apply topically 3 (three) times a day   pancrelipase, Lip-Prot-Amyl, (CREON) 24,000 units  Self No No   Sig: Take 24,000 units of lipase by mouth 3 (three) times a day with meals   pantoprazole (PROTONIX) 40 mg tablet  Self No No   Sig: Take 1 tablet (40 mg total) by mouth 2 (two) times a day   torsemide (DEMADEX) 20  mg tablet  Self No No   Sig: TAKE 0.5 TABLETS (10 MG TOTAL) BY MOUTH DAILY TAKES 10 MG ONCE A DAY.      Facility-Administered Medications: None     Discharge Medication List as of 10/6/2024  4:29 PM        CONTINUE these medications which have NOT CHANGED    Details   allopurinol (ZYLOPRIM) 300 mg tablet TAKE 1 TABLET DAILY, Starting Wed 7/10/2024, Normal      amLODIPine (NORVASC) 10 mg tablet TAKE 1 TABLET DAILY, Normal      amoxicillin (AMOXIL) 500 mg capsule TAKE 4 CAPSULES BY MOUTH 1 HOUR PRIOR TO ORAL SURGERY APPOINTMENT, Historical Med      atorvastatin (LIPITOR) 80 mg tablet TAKE 1 TABLET DAILY AT     BEDTIME, Normal      bacitracin topical ointment 500 units/g topical ointment Apply 1 large application topically 2 (two) times a day for 7 days, Starting Thu 9/12/2024, Until Thu 9/19/2024, Normal      calcitriol (ROCALTROL) 0.25 mcg capsule Take 1 capsule (0.25 mcg total) by mouth daily, Starting Tue 9/5/2023, Normal      clopidogrel (PLAVIX) 75 mg tablet Take 1 tablet (75 mg total) by mouth daily, Starting Tue 8/27/2024, Normal      glimepiride (AMARYL) 1 mg tablet Take 1 tablet (1 mg total) by mouth daily with breakfast, Starting Wed 6/12/2024, Until Mon 12/9/2024, Normal      hydrALAZINE (APRESOLINE) 25 mg tablet Take 1 tablet (25 mg total) by mouth 2 (two) times a day, Starting Mon 3/18/2024, Normal      lisinopril (ZESTRIL) 5 mg tablet Take 1 tablet (5 mg total) by mouth daily, Starting Tue 5/14/2024, Normal      metoprolol succinate (TOPROL-XL) 25 mg 24 hr tablet Take 0.5 tablets (12.5 mg total) by mouth daily, Starting Wed 2/21/2024, Normal      mupirocin (BACTROBAN) 2 % ointment Apply topically 3 (three) times a day, Starting Tue 1/16/2024, Normal      pancrelipase, Lip-Prot-Amyl, (CREON) 24,000 units Take 24,000 units of lipase by mouth 3 (three) times a day with meals, Starting Tue 1/16/2024, Normal      pantoprazole (PROTONIX) 40 mg tablet Take 1 tablet (40 mg total) by mouth 2 (two) times a day,  Starting Tue 2/6/2024, Normal      torsemide (DEMADEX) 20 mg tablet TAKE 0.5 TABLETS (10 MG TOTAL) BY MOUTH DAILY TAKES 10 MG ONCE A DAY., Starting Tue 11/7/2023, Normal           No discharge procedures on file.  ED SEPSIS DOCUMENTATION   Time reflects when diagnosis was documented in both MDM as applicable and the Disposition within this note       Time User Action Codes Description Comment    10/6/2024  4:15 PM Patricia León [K92.2] GI bleed     10/6/2024  4:15 PM Patricia León [D64.9] Chronic anemia     10/6/2024  4:15 PM Patricia León [N18.9] CKD (chronic kidney disease)     10/7/2024  2:34 AM Patricia León [R03.0] Elevated blood pressure reading     10/7/2024  2:36 AM Patricia León [K92.1] Odilia León MD  10/07/24 0238

## 2024-10-06 NOTE — DISCHARGE INSTRUCTIONS
You were evaluated in the emergency department for: black stool. You can access your current and pending results through [employer] [employer].    - You should follow-up with your primary care provider, as soon as possible, for re-evaluation.  - You should follow-up with your PCP and your GI doctor  - Please continue your Protonix    Please, return to the emergency department if you experience new or worsening symptoms, fever, chest pain, shortness of breath, difficulty breathing, dizziness, abdominal pain, persistent nausea/vomiting, syncope or passing out, blood in your urine or stool, coughing up blood, leg swelling/pain, urinary retention, bowel or bladder incontinence, numbness between your legs.

## 2024-10-07 ENCOUNTER — TELEPHONE (OUTPATIENT)
Age: 79
End: 2024-10-07

## 2024-10-07 ENCOUNTER — VBI (OUTPATIENT)
Dept: FAMILY MEDICINE CLINIC | Facility: CLINIC | Age: 79
End: 2024-10-07

## 2024-10-07 DIAGNOSIS — D50.9 IRON DEFICIENCY ANEMIA, UNSPECIFIED IRON DEFICIENCY ANEMIA TYPE: Primary | ICD-10-CM

## 2024-10-07 NOTE — TELEPHONE ENCOUNTER
Patient was in the ED yesterday for a GI bleed.  He was told to ask his PCP for a follow up HGB.  Patient is asking if this can be put in for him/.    Please advise and contact patient.

## 2024-10-07 NOTE — TELEPHONE ENCOUNTER
Last OV 9/19/24 Dr. Summers Gastrointestinal hemorrhage associated with gastric ulcer  Next OV 5/29/24  EGD 1/11/24  Colonoscopy 12/8/23    Pt reports he was seen in ED yesterday for black stools. CT, Abdomen, labs including CBC and UA completed; no acute findings. Was told to follow up with GI. States she spoke with PCP and they will be ordering repeat labs. States black stool has resolved. Does not want to make appt if not needed.

## 2024-10-07 NOTE — TELEPHONE ENCOUNTER
10/07/24 11:48 AM    Patient contacted post ED visit, [facility] department spoke with patient/caregiver and outreach was successful.    Thank you.  Migdalia Gutierres MA  [facility]

## 2024-10-10 ENCOUNTER — OFFICE VISIT (OUTPATIENT)
Dept: SURGERY | Facility: CLINIC | Age: 79
End: 2024-10-10

## 2024-10-10 VITALS
SYSTOLIC BLOOD PRESSURE: 136 MMHG | WEIGHT: 173 LBS | HEIGHT: 70 IN | TEMPERATURE: 96.9 F | DIASTOLIC BLOOD PRESSURE: 60 MMHG | BODY MASS INDEX: 24.77 KG/M2 | OXYGEN SATURATION: 96 % | HEART RATE: 55 BPM

## 2024-10-10 DIAGNOSIS — C44.729 SQUAMOUS CELL CARCINOMA OF LEFT LOWER LEG: Primary | ICD-10-CM

## 2024-10-10 PROCEDURE — 99024 POSTOP FOLLOW-UP VISIT: CPT | Performed by: SURGERY

## 2024-10-10 NOTE — LETTER
October 10, 2024     Simón Hadley MD  [address] [facility] [address]    Patient: Jay Roach Jr.   YOB: 1945   Date of Visit: 10/10/2024       Dear Dr. Hadley:    Thank you for referring Jay Roach to me for evaluation. Below are my notes for this consultation.    If you have questions, please do not hesitate to call me. I look forward to following your patient along with you.         Sincerely,        Robert Bloch, MD        CC: No Recipients

## 2024-10-10 NOTE — TELEPHONE ENCOUNTER
Pt. Called back, reviewed Dr. Summers recommendation, pt. To have re-peat CBC tomorrow or Saturday, will call back if worsening symptoms

## 2024-10-10 NOTE — PROGRESS NOTES
First postoperative visit after excision of a lesion from the left lower leg.  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  This is actually what I thought looking at it.  At the time of excision I could not get this wound closed so I partially closed it and packed the middle.  On examination today the wound is actually clean.  It has a granulation base in the middle and is starting to retract.  I dressed it with silver cell and gave him enough dressings for couple weeks.  I told him that if this does not show closure in the next month, I am going to have to put a skin graft on

## 2024-10-15 ENCOUNTER — OFFICE VISIT (OUTPATIENT)
Dept: DERMATOLOGY | Facility: CLINIC | Age: 79
End: 2024-10-15
Payer: MEDICARE

## 2024-10-15 ENCOUNTER — APPOINTMENT (OUTPATIENT)
Dept: LAB | Facility: MEDICAL CENTER | Age: 79
End: 2024-10-15
Payer: MEDICARE

## 2024-10-15 VITALS — BODY MASS INDEX: 24.48 KG/M2 | WEIGHT: 171 LBS | HEIGHT: 70 IN

## 2024-10-15 DIAGNOSIS — C44.729 SCC (SQUAMOUS CELL CARCINOMA), LEG, LEFT: ICD-10-CM

## 2024-10-15 DIAGNOSIS — D22.72 MULTIPLE BENIGN MELANOCYTIC NEVI OF BOTH UPPER EXTREMITIES, BOTH LOWER EXTREMITIES, AND TRUNK: ICD-10-CM

## 2024-10-15 DIAGNOSIS — L82.1 SEBORRHEIC KERATOSIS: ICD-10-CM

## 2024-10-15 DIAGNOSIS — D22.61 MULTIPLE BENIGN MELANOCYTIC NEVI OF BOTH UPPER EXTREMITIES, BOTH LOWER EXTREMITIES, AND TRUNK: ICD-10-CM

## 2024-10-15 DIAGNOSIS — D22.71 MULTIPLE BENIGN MELANOCYTIC NEVI OF BOTH UPPER EXTREMITIES, BOTH LOWER EXTREMITIES, AND TRUNK: ICD-10-CM

## 2024-10-15 DIAGNOSIS — L81.4 LENTIGO: ICD-10-CM

## 2024-10-15 DIAGNOSIS — L85.3 XEROSIS OF SKIN: ICD-10-CM

## 2024-10-15 DIAGNOSIS — D22.5 MULTIPLE BENIGN MELANOCYTIC NEVI OF BOTH UPPER EXTREMITIES, BOTH LOWER EXTREMITIES, AND TRUNK: ICD-10-CM

## 2024-10-15 DIAGNOSIS — L57.0 ACTINIC KERATOSIS: ICD-10-CM

## 2024-10-15 DIAGNOSIS — D50.9 IRON DEFICIENCY ANEMIA, UNSPECIFIED IRON DEFICIENCY ANEMIA TYPE: ICD-10-CM

## 2024-10-15 DIAGNOSIS — D18.01 CHERRY ANGIOMA: ICD-10-CM

## 2024-10-15 DIAGNOSIS — D22.62 MULTIPLE BENIGN MELANOCYTIC NEVI OF BOTH UPPER EXTREMITIES, BOTH LOWER EXTREMITIES, AND TRUNK: ICD-10-CM

## 2024-10-15 DIAGNOSIS — Z85.89 HISTORY OF SQUAMOUS CELL CARCINOMA: Primary | ICD-10-CM

## 2024-10-15 PROCEDURE — 17000 DESTRUCT PREMALG LESION: CPT | Performed by: DERMATOLOGY

## 2024-10-15 PROCEDURE — 99214 OFFICE O/P EST MOD 30 MIN: CPT | Performed by: DERMATOLOGY

## 2024-10-15 PROCEDURE — 17003 DESTRUCT PREMALG LES 2-14: CPT | Performed by: DERMATOLOGY

## 2024-10-15 NOTE — PROGRESS NOTES
"[facility]ology [facility] Note     Patient Name: Jay Roach Jr.  Encounter Date: 10/15/24     Have you been cared for by a [facility] Dermatologist in the last 3 years and, if so, which description applies to you?    Yes.  I have been here within the last 3 years, and my medical history has NOT changed since that time.  I am MALE/not capable of bearing children.    REVIEW OF SYSTEMS:  Have you recently had or currently have any of the following? No changes in my recent health.   PAST MEDICAL HISTORY:  Have you personally ever had or currently have any of the following?  If \"YES,\" then please provide more detail. No changes in my medical history. Radiation for lung cancer   HISTORY OF IMMUNOSUPPRESSION: Do you have a history of any of the following:  Systemic Immunosuppression such as Diabetes, Biologic or Immunotherapy, Chemotherapy, Organ Transplantation, Bone Marrow Transplantation or Prednisone?  No     Answering \"YES\" requires the addition of the dotphrase \"IMMUNOSUPPRESSED\" as the first diagnosis of the patient's visit.   FAMILY HISTORY:  Any \"first degree relatives\" (parent, brother, sister, or child) with the following?    No changes in my family's known health.   PATIENT EXPERIENCE:    Do you want the Dermatologist to perform a COMPLETE skin exam today including a clinical examination under the \"bra and underwear\" areas?  Yes not feet or under underwear  If necessary, do we have your permission to call and leave a detailed message on your Preferred Phone number that includes your specific medical information?  Yes      No Known Allergies   Current Outpatient Medications:     allopurinol (ZYLOPRIM) 300 mg tablet, TAKE 1 TABLET DAILY, Disp: 90 tablet, Rfl: 1    amLODIPine (NORVASC) 10 mg tablet, TAKE 1 TABLET DAILY, Disp: 90 tablet, Rfl: 3    amoxicillin (AMOXIL) 500 mg capsule, TAKE 4 CAPSULES BY MOUTH 1 HOUR PRIOR TO ORAL SURGERY APPOINTMENT (Patient not taking: Reported on 9/23/2024), Disp: , " Rfl:     atorvastatin (LIPITOR) 80 mg tablet, TAKE 1 TABLET DAILY AT     BEDTIME, Disp: 90 tablet, Rfl: 3    bacitracin topical ointment 500 units/g topical ointment, Apply 1 large application topically 2 (two) times a day for 7 days, Disp: 28 g, Rfl: 0    calcitriol (ROCALTROL) 0.25 mcg capsule, Take 1 capsule (0.25 mcg total) by mouth daily, Disp: 90 capsule, Rfl: 4    clopidogrel (PLAVIX) 75 mg tablet, Take 1 tablet (75 mg total) by mouth daily, Disp: 90 tablet, Rfl: 0    glimepiride (AMARYL) 1 mg tablet, Take 1 tablet (1 mg total) by mouth daily with breakfast, Disp: 90 tablet, Rfl: 1    hydrALAZINE (APRESOLINE) 25 mg tablet, Take 1 tablet (25 mg total) by mouth 2 (two) times a day, Disp: 180 tablet, Rfl: 3    lisinopril (ZESTRIL) 5 mg tablet, Take 1 tablet (5 mg total) by mouth daily, Disp: 90 tablet, Rfl: 3    metoprolol succinate (TOPROL-XL) 25 mg 24 hr tablet, Take 0.5 tablets (12.5 mg total) by mouth daily, Disp: 90 tablet, Rfl: 1    mupirocin (BACTROBAN) 2 % ointment, Apply topically 3 (three) times a day, Disp: 22 g, Rfl: 0    pancrelipase, Lip-Prot-Amyl, (CREON) 24,000 units, Take 24,000 units of lipase by mouth 3 (three) times a day with meals, Disp: 360 capsule, Rfl: 1    pantoprazole (PROTONIX) 40 mg tablet, Take 1 tablet (40 mg total) by mouth 2 (two) times a day, Disp: 180 tablet, Rfl: 1    torsemide (DEMADEX) 20 mg tablet, TAKE 0.5 TABLETS (10 MG TOTAL) BY MOUTH DAILY TAKES 10 MG ONCE A DAY., Disp: 90 tablet, Rfl: 3          Whom besides the patient is providing clinical information about today's encounter?   NO ADDITIONAL HISTORIAN (patient alone provided history)    Physical Exam and Assessment/Plan by Diagnosis:    HISTORY OF SQUAMOUS CELL CARCINOMA      Physical Exam:  Anatomic Location Affected:  left superior helix   Morphological Description of Scar:  Well healed scar  Suspected Recurrence: no  Regional adenopathy: no     Additional History of Present Condition:  Patient had Mohs on  01/11/2023 by Dr. Tovar.     Assessment and Plan:  Based on a thorough discussion of this condition and the management approach to it (including a comprehensive discussion of the known risks, side effects and potential benefits of treatment), the patient (family) agrees to implement the following specific plan:  When outside we recommend using a wide brim hat, sunglasses, long sleeve and pants, sunscreen with SPF 30+ with reapplication every 2 hours, or SPF specific clothing    Continue to monitor site for any changes      How can SCC be prevented?  The most important way to prevent SCC is to avoid sunburn. This is especially important in childhood and early life. Fair skinned individuals and those with a personal or family history of BCC should protect their skin from sun exposure daily, year-round and lifelong.  Stay indoors or under the shade in the middle of the day   Wear covering clothing   Apply high protection factor SPF50+ broad-spectrum sunscreens generously to exposed skin if outdoors   Avoid indoor tanning (sun beds, solaria)        What is the outlook for SCC?  Most SCC are cured by treatment. Cure is most likely if treatment is undertaken when the lesion is small. A small percent of SCC's can spread to lymph  nodes and long term monitoring is indicated.   They are also at increased risk of other skin cancers, especially melanoma. Regular self-skin examinations and long-term annual skin checks by an experienced health professional are recommended.     SQUAMOUS CELL CARCINOMA    Physical Exam:  Anatomic Location Affected:  left lower leg  Morphological Description:  bandaged declines exam continue follow up with Dr Bloch  Pertinent Positives:  Pertinent Negatives:    Additional History of Present Condition:  Patient presents for a full skin check. Patient recently had a biopsy on the left lower leg with Dr Park which proved SCC.     Case Report   Surgical Pathology Report                         Case:  [id number]                                   Authorizing Provider:  Robert Bloch, MD           Collected:           09/23/2024 1521               Ordering Location:     [facility]  Received:            09/23/2024 1522                                      [place]                                                                       Pathologist:           Joshua Anderson MD                                                       Specimen:    Leg, Left, Lesion, Left leg                                                                Final Diagnosis   A. Skin, left leg:  SQUAMOUS CELL CARCINOMA, WELL-DIFFERENTIATED     Note: The lesion extends to one lateral margin.        Assessment and Plan:  Based on a thorough discussion of this condition and the management approach to it (including a comprehensive discussion of the known risks, side effects and potential benefits of treatment), the patient (family) agrees to implement the following specific plan:  Dr Bauer will follow up with Dr Bloch, plan to monitor positive margins    What is cutaneous squamous cell carcinoma?  Cutaneous squamous cell carcinoma (SCC) is a common type of keratinocyte or non-melanoma skin cancer. It is derived from cells within the epidermis that make keratin -- the horny protein that makes up skin, hair and nails.  Cutaneous SCC is an invasive disease, referring to cancer cells that have grown beyond the epidermis. SCC can sometimes metastasise and may prove fatal.  Intraepidermal carcinoma (cutaneous SCC in situ) and mucosal SCC are considered elsewhere.    Who gets cutaneous squamous cell carcinoma?  Risk factors for cutaneous SCC include:  Age and sex: SCCs are particularly prevalent in elderly males. However, they also affect females and younger adults.   Previous SCC or another form of skin cancer (basal cell carcinoma, melanoma) are a strong predictor for further skin cancers.   Actinic keratoses   Outdoor occupation or  recreation   Smoking   Fair skin, blue eyes and blond or red hair   Previous cutaneous injury, thermal burn, disease (eg cutaneous lupus, epidermolysis bullosa, leg ulcer)   Inherited syndromes: SCC is a particular problem for families with xeroderma pigmentosum and albinism   Other risk factors include ionising radiation, exposure to arsenic, and immune suppression due to disease (eg chronic lymphocytic leukaemia) or medicines. Organ transplant recipients have a massively increased risk of developing SCC.    What causes cutaneous squamous cell carcinoma?  More than 90% of cases of SCC are associated with numerous DNA mutations in multiple somatic genes. Mutations in the p53 tumour suppressor gene are caused by exposure to ultraviolet radiation (UV), especially UVB (known as signature 7). Other signature mutations relate to cigarette smoking, ageing and immune suppression (eg, to drugs such as azathioprine). Mutations in signalling pathways affect the epidermal growth factor receptor, JEAN CLAUDE, Fyn, and [id number] signalling.   Beta-genus human papillomaviruses (wart virus) are thought to play a role in SCC arising in immune-suppressed populations. ?-HPV and HPV subtypes 5, 8, 17, 20, 24, and 38 have also been associated with an increased risk of cutaneous SCC in immunocompetent individuals.     What are the clinical features of cutaneous squamous cell carcinoma?  Cutaneous SCCs present as enlarging scaly or crusted lumps. They usually arise within pre-existing actinic keratosis or intraepidermal carcinoma.  They grow over weeks to months   They may ulcerate   They are often tender or painful   Located on sun-exposed sites, particularly the face, lips, ears, hands, forearms and lower legs   Size varies from a few millimetres to several centimetres in diameter.    Types of cutaneous squamous cell carcinoma  Distinct clinical types of invasive cutaneous SCC include:  Cutaneous horn -- the horn is due to excessive production  of keratin   Keratoacanthoma (KA) -- a rapidly growing keratinising nodule that may resolve without treatment   Carcinoma cuniculatum (‘verrucous carcinoma’), a slow-growing, warty tumour on the sole of the foot.   Multiple eruptive SCC/KA-like lesions arising in syndromes, such as multiple self-healing squamous epitheliomas of Robert-Smith and Grzybowski syndrome  The pathologist may classify a tumour as well differentiated, moderately well differentiated, poorly differentiated or anaplastic cutaneous SCC. There are other variants.    Classification of squamous cell carcinoma by risk  Cutaneous SCC is classified as low-risk or high-risk, depending on the chance of tumour recurrence and metastasis. Characteristics of high-risk SCC include:  High-risk cutaneous squamous cell carcinoma has the following characteristics:  Diameter greater than or equal to 2 cm   Location on the ear, vermilion of the lip, central face, hands, feet, genitalia   Arising in elderly or immune suppressed patient   Histological thickness greater than 2 mm, poorly differentiated histology, or with the invasion of the subcutaneous tissue, nerves and blood vessels  Metastatic SCC is found in regional lymph nodes (80%), lungs, liver, brain, bones and skin.    Staging cutaneous squamous cell carcinoma  In 2011, the American Joint Committee on Cancer (AJCC) published a new staging systemic for cutaneous SCC for the 7th Edition of the AJCC manual. This evaluates the dimensions of the original primary tumour (T) and its metastases to lymph nodes (N).    Tumour staging for cutaneous SCC  TX: Th Primary tumour cannot be assessed  T0: No evidence of a primary tumour  Tis: Carcinoma in situ  T1: Tumour <= 2cm without high-risk features  T2: Tumour >= 2cm; or; Tumour <= 2 cm with high-risk features  T3: Tumour with the invasion of maxilla, mandible, orbit or temporal bone  T4: Tumour with the invasion of axial or appendicular skeleton or perineural  invasion of skull base    Paul staging for cutaneous SCC  NX: Regional lymph nodes cannot be assessed  N0: No regional lymph node metastasis  N1: Metastasis in one local lymph node <= 3cm  N2: Metastasis in one local lymph node >= 3cm; or; Metastasis in >1 local lymph node <= 6cm  N3: Metastasis in lymph node >= 6cm    How is squamous cell carcinoma diagnosed?  Diagnosis of cutaneous SCC is based on clinical features. The diagnosis and histological subtype are confirmed pathologically by diagnostic biopsy or following excision. See squamous cell carcinoma - pathology.  Patients with high-risk SCC may also undergo staging investigations to determine whether it has spread to lymph nodes or elsewhere. These may include:  Imaging using ultrasound scan, X-rays, CT scans, MRI scans   Lymph node or other tissue biopsies    What is the treatment for cutaneous squamous cell carcinoma?  Cutaneous SCC is nearly always treated surgically. Most cases are excised with a 3-10 mm margin of normal tissue around a visible tumour. A flap or skin graft may be needed to repair the defect.  Other methods of removal include:  Shave, curettage, and electrocautery for low-risk tumours on trunk and limbs   Aggressive cryotherapy for very small, thin, low-risk tumours   Mohs micrographic surgery for large facial lesions with indistinct margins or recurrent tumours   Radiotherapy for an inoperable tumour, patients unsuitable for surgery, or as adjuvant    What is the treatment for advanced or metastatic squamous cell carcinoma?  Locally advanced primary, recurrent or metastatic SCC requires multidisciplinary consultation. Often a combination of treatments is used.  Surgery   Radiotherapy   Cemiplimab   Experimental targeted therapy using epidermal growth factor receptor inhibitors    How can cutaneous squamous cell carcinoma be prevented?  There is a great deal of evidence to show that very careful sun protection at any time of life reduces  "the number of SCCs. This is particularly important in ageing, sun-damaged, fair skin; in patients that are immune suppressed; and in those who already have actinic keratoses or previous SCC.  Stay indoors or under the shade in the middle of the day   Wear covering clothing   Apply high protection factor SPF50+ broad-spectrum sunscreens generously to exposed skin if outdoors   Avoid indoor tanning (sun beds, solaria)    Oral nicotinamide (vitamin B3) in a dose of 500 mg twice daily may reduce the number and severity of SCCs in people at high risk.  Patients with multiple squamous cell carcinomas may be prescribed an oral retinoid (acitretin or isotretinoin). These reduce the number of tumours but have some nuisance side effects.    What is the outlook for cutaneous squamous cell carcinoma?  Most SCCs are cured by treatment. A cure is most likely if treatment is undertaken when the lesion is small. The risk of recurrence or disease-associated death is greater for tumours that are > 20 mm in diameter and/or > 2 mm in thickness at the time of surgical excision.  About 50% of people at high risk of SCC develop a second one within 5 years of the first. They are also at increased risk of other skin cancers, especially melanoma. Regular self-skin examinations and long-term annual skin checks by an experienced health professional are recommended.   MELANOCYTIC NEVI (\"Moles\")    Physical Exam:  Anatomic Location Affected:   Mostly on sun-exposed areas of the trunk and extremities  Morphological Description:  Scattered, 1-4mm round to ovoid, symmetrical-appearing, even bordered, skin colored to dark brown macules/papules, mostly in sun-exposed areas  Pertinent Positives:  Pertinent Negatives:    Additional History of Present Condition:      Assessment and Plan:  Based on a thorough discussion of this condition and the management approach to it (including a comprehensive discussion of the known risks, side effects and potential " "benefits of treatment), the patient (family) agrees to implement the following specific plan:  When outside we recommend using a wide brim hat, sunglasses, long sleeve and pants, sunscreen with SPF 30+ with reapplication every 2 hours, or SPF specific clothing   Benign, reassured  Annual skin check     Melanocytic Nevi  Melanocytic nevi (\"moles\") are tan or brown, raised or flat areas of the skin which have an increased number of melanocytes. Melanocytes are the cells in our body which make pigment and account for skin color.    Some moles are present at birth (I.e., \"congenital nevi\"), while others come up later in life (i.e., \"acquired nevi\").  The sun can stimulate the body to make more moles.  Sunburns are not the only thing that triggers more moles.  Chronic sun exposure can do it too.     Clinically distinguishing a healthy mole from melanoma may be difficult, even for experienced dermatologists. The \"ABCDE's\" of moles have been suggested as a means of helping to alert a person to a suspicious mole and the possible increased risk of melanoma.  The suggestions for raising alert are as follows:    Asymmetry: Healthy moles tend to be symmetric, while melanomas are often asymmetric.  Asymmetry means if you draw a line through the mole, the two halves do not match in color, size, shape, or surface texture. Asymmetry can be a result of rapid enlargement of a mole, the development of a raised area on a previously flat lesion, scaling, ulceration, bleeding or scabbing within the mole.  Any mole that starts to demonstrate \"asymmetry\" should be examined promptly by a board certified dermatologist.     Border: Healthy moles tend to have discrete, even borders.  The border of a melanoma often blends into the normal skin and does not sharply delineate the mole from normal skin.  Any mole that starts to demonstrate \"uneven borders\" should be examined promptly by a board certified dermatologist.     Color: Healthy moles tend " "to be one color throughout.  Melanomas tend to be made up of different colors ranging from dark black, blue, white, or red.  Any mole that demonstrates a color change should be examined promptly by a board certified dermatologist.     Diameter: Healthy moles tend to be smaller than 0.6 cm in size; an exception are \"congenital nevi\" that can be larger.  Melanomas tend to grow and can often be greater than 0.6 cm (1/4 of an inch, or the size of a pencil eraser). This is only a guideline, and many normal moles may be larger than 0.6 cm without being unhealthy.  Any mole that starts to change in size (small to bigger or bigger to smaller) should be examined promptly by a board certified dermatologist.     Evolving: Healthy moles tend to \"stay the same.\"  Melanomas may often show signs of change or evolution such as a change in size, shape, color, or elevation.  Any mole that starts to itch, bleed, crust, burn, hurt, or ulcerate or demonstrate a change or evolution should be examined promptly by a board certified dermatologist.        LENTIGO    Physical Exam:  Anatomic Location Affected:  trunk, arms  Morphological Description:  Light brown macules  Pertinent Positives:  Pertinent Negatives:    Additional History of Present Condition:      Assessment and Plan:  Based on a thorough discussion of this condition and the management approach to it (including a comprehensive discussion of the known risks, side effects and potential benefits of treatment), the patient (family) agrees to implement the following specific plan:  When outside we recommend using a wide brim hat, sunglasses, long sleeve and pants, sunscreen with SPF 30+ with reapplication every 2 hours, or SPF specific clothing       What is a lentigo?  A lentigo is a pigmented flat or slightly raised lesion with a clearly defined edge. Unlike an ephelis (freckle), it does not fade in the winter months. There are several kinds of lentigo.  The name lentigo " originally referred to its appearance resembling a small lentil. The plural of lentigo is lentigines, although “lentigos” is also in common use.    Who gets lentigines?  Lentigines can affect males and females of all ages and races. Solar lentigines are especially prevalent in fair skinned adults. Lentigines associated with syndromes are present at birth or arise during childhood.    What causes lentigines?  Common forms of lentigo are due to exposure to ultraviolet radiation:  Sun damage including sunburn   Indoor tanning   Phototherapy, especially photochemotherapy (PUVA)    Ionizing radiation, eg radiation therapy, can also cause lentigines.  Several familial syndromes associated with widespread lentigines originate from mutations in Aung-MAP kinase, mTOR signaling and PTEN pathways.    What is the treatment for lentigines?  Most lentigines are left alone. Attempts to lighten them may not be successful. The following approaches are used:  SPF 50+ broad-spectrum sunscreen   Hydroquinone bleaching cream   Alpha hydroxy acids   Vitamin C   Retinoids   Azelaic acid   Chemical peels  Individual lesions can be permanently removed using:  Cryotherapy   Intense pulsed light   Pigment lasers    How can lentigines be prevented?  Lentigines associated with exposure ultraviolet radiation can be prevented by very careful sun protection. Clothing is more successful at preventing new lentigines than are sunscreens.    What is the outlook for lentigines?  Lentigines usually persist. They may increase in number with age and sun exposure. Some in sun-protected sites may fade and disappear.    RINALDI ANGIOMAS    Physical Exam:  Anatomic Location Affected:  trunk  Morphological Description:  Scattered cherry red, 1-4 mm papules.  Pertinent Positives:  Pertinent Negatives:    Additional History of Present Condition:      Assessment and Plan:  Based on a thorough discussion of this condition and the management approach to it (including  "a comprehensive discussion of the known risks, side effects and potential benefits of treatment), the patient (family) agrees to implement the following specific plan:  Monitor for changes  Benign, reassured      Assessment and Plan:    Cherry angioma, also known as Mcclelland de Tunde spots, are benign vascular skin lesions. A \"cherry angioma\" is a firm red, blue or purple papule, 0.1-1 cm in diameter. When thrombosed, they can appear black in colour until evaluated with a dermatoscope when the red or purple colour is more easily seen. Cherry angioma may develop on any part of the body but most often appear on the scalp, face, lips and trunk.  An angioma is due to proliferating endothelial cells; these are the cells that line the inside of a blood vessel.    Angiomas can arise in early life or later in life; the most common type of angioma is a cherry angioma.  Cherry angiomas are very common in males and females of any age or race. They are more noticeable in white skin than in skin of colour. They markedly increase in number from about the age of 40. There may be a family history of similar lesions. Eruptive cherry angiomas have been rarely reported to be associated with internal malignancy. The cause of angiomas is unknown. Genetic analysis of cherry angiomas has shown that they frequently carry specific somatic missense mutations in the GNAQ and GNA11 (Q209H) genes, which are involved in other vascular and melanocytic proliferations.      SEBORRHEIC KERATOSIS; NON-INFLAMED    Physical Exam:  Anatomic Location Affected:  trunk  Morphological Description:  Flat and raised, waxy, smooth to warty textured, yellow to brownish-grey to dark brown to blackish, discrete, \"stuck-on\" appearing papules.  Pertinent Positives:  Pertinent Negatives:    Additional History of Present Condition:      Assessment and Plan:  Based on a thorough discussion of this condition and the management approach to it (including a comprehensive " "discussion of the known risks, side effects and potential benefits of treatment), the patient (family) agrees to implement the following specific plan:  Monitor for changes  Benign, reassured      Seborrheic Keratosis  A seborrheic keratosis is a harmless warty spot that appears during adult life as a common sign of skin aging.  Seborrheic keratoses can arise on any area of skin, covered or uncovered, with the usual exception of the palms and soles. They do not arise from mucous membranes. Seborrheic keratoses can have highly variable appearance.      Seborrheic keratoses are extremely common. It has been estimated that over 90% of adults over the age of 60 years have one or more of them. They occur in males and females of all races, typically beginning to erupt in the 30s or 40s. They are uncommon under the age of 20 years.  The precise cause of seborrhoeic keratoses is not known.  Seborrhoeic keratoses are considered degenerative in nature. As time goes by, seborrheic keratoses tend to become more numerous. Some people inherit a tendency to develop a very large number of them; some people may have hundreds of them.      There is no easy way to remove multiple lesions on a single occasion.  Unless a specific lesion is \"inflamed\" and is causing pain or stinging/burning or is bleeding, most insurance companies do not authorize treatment.    XEROSIS (\"DRY SKIN\")    Physical Exam:  Anatomic Location Affected:  diffuse  Morphological Description:  xerosis  Pertinent Positives:  Pertinent Negatives:    Additional History of Present Condition:      Assessment and Plan:  Based on a thorough discussion of this condition and the management approach to it (including a comprehensive discussion of the known risks, side effects and potential benefits of treatment), the patient (family) agrees to implement the following specific plan:  Use moisturizer like Eucerin,Cerave or Aveeno Cream 3 times a day for the dry skin      "       Dry skin refers to skin that feels dry to touch. Dry skin has a dull surface with a rough, scaly quality. The skin is less pliable and cracked. When dryness is severe, the skin may become inflamed and fissured.  Although any body site can be dry, dry skin tends to affect the shins more than any other site.    Dry skin is lacking moisture in the outer horny cell layer (stratum corneum) and this results in cracks in the skin surface.  Dry skin is also called xerosis, xeroderma or asteatosis (lack of fat).  It can affect males and females of all ages. There is some racial variability in water and lipid content of the skin.  Dry skin that starts in early childhood may be one of about 20 types of ichthyosis (fish-scale skin). There is often a family history of dry skin.   Dry skin is commonly seen in people with atopic dermatitis.  Nearly everyone > 60 years has dry skin.    Dry skin that begins later may be seen in people with certain diseases and conditions.  Postmenopausal women  Hypothyroidism  Chronic renal disease   Malnutrition and weight loss   Subclinical dermatitis   Treatment with certain drugs such as oral retinoids, diuretics and epidermal growth factor receptor inhibitors      What is the treatment for dry skin?  The mainstay of treatment of dry skin and ichthyosis is moisturisers/emollients. They should be applied liberally and often enough to:  Reduce itch   Improve the barrier function   Prevent entry of irritants, bacteria   Reduce transepidermal water loss.      How can dry skin be prevented?  Eliminate aggravating factors:  Reduce the frequency of bathing.   A humidifier in winter and air conditioner in summer   Compare having a short shower with a prolonged soak in a bath.   Use lukewarm, not hot, water.   Replace standard soap with a substitute such as a synthetic detergent cleanser, water-miscible emollient, bath oil, anti-pruritic tar oil, colloidal oatmeal etc.   Apply an emollient  liberally and often, particularly shortly after bathing, and when itchy. The drier the skin, the thicker this should be, especially on the hands.    What is the outlook for dry skin?  A tendency to dry skin may persist life-long, or it may improve once contributing factors are controlled.     ACTINIC KERATOSIS    Physical Exam:  Anatomic Location Affected:  scalp, forehead and nose  Morphological Description:  Scaly pink papules  Pertinent Positives:  Pertinent Negatives:      Assessment and Plan:  Based on a thorough discussion of this condition and the management approach to it (including a comprehensive discussion of the known risks, side effects and potential benefits of treatment), the patient (family) agrees to implement the following specific plan:  When outside we recommend using a wide brim hat, sunglasses, long sleeve and pants, sunscreen with SPF 30+ with reapplication every 2 hours, or SPF specific clothing   liquid nitrogen to treat areas. Consent obtained. Expect area to blister, crust, and then fall off within 2 weeks. Please use vaseline.                                     PROCEDURE:  DESTRUCTION OF PRE-MALIGNANT LESIONS  After a thorough discussion of treatment options and risk/benefits/alternatives (including but not limited to local pain, scarring, dyspigmentation, blistering, and possible superinfection), verbal and written consent were obtained and the aforementioned lesions were treated on with cryotherapy using liquid nitrogen x 1 cycle for 5-10 seconds.    TOTAL NUMBER of 11 pre-malignant lesions were treated today on the ANATOMIC LOCATION: scalp, forehead, nose.     The patient tolerated the procedure well, and after-care instructions were provided.          Scribe Attestation      I,:  Germaine Pulido MA am acting as a scribe while in the presence of the attending physician.:       I,:  Jaqui Bauer MD personally performed the services described in this documentation    as scribed in my  presence.:

## 2024-10-15 NOTE — PATIENT INSTRUCTIONS
.lheWhat is cutaneous squamous cell carcinoma?  Cutaneous squamous cell carcinoma (SCC) is a common type of keratinocyte or non-melanoma skin cancer. It is derived from cells within the epidermis that make keratin -- the horny protein that makes up skin, hair and nails.  Cutaneous SCC is an invasive disease, referring to cancer cells that have grown beyond the epidermis. SCC can sometimes metastasise and may prove fatal.  Intraepidermal carcinoma (cutaneous SCC in situ) and mucosal SCC are considered elsewhere.    Who gets cutaneous squamous cell carcinoma?  Risk factors for cutaneous SCC include:  Age and sex: SCCs are particularly prevalent in elderly males. However, they also affect females and younger adults.   Previous SCC or another form of skin cancer (basal cell carcinoma, melanoma) are a strong predictor for further skin cancers.   Actinic keratoses   Outdoor occupation or recreation   Smoking   Fair skin, blue eyes and blond or red hair   Previous cutaneous injury, thermal burn, disease (eg cutaneous lupus, epidermolysis bullosa, leg ulcer)   Inherited syndromes: SCC is a particular problem for families with xeroderma pigmentosum and albinism   Other risk factors include ionising radiation, exposure to arsenic, and immune suppression due to disease (eg chronic lymphocytic leukaemia) or medicines. Organ transplant recipients have a massively increased risk of developing SCC.    What causes cutaneous squamous cell carcinoma?  More than 90% of cases of SCC are associated with numerous DNA mutations in multiple somatic genes. Mutations in the p53 tumour suppressor gene are caused by exposure to ultraviolet radiation (UV), especially UVB (known as signature 7). Other signature mutations relate to cigarette smoking, ageing and immune suppression (eg, to drugs such as azathioprine). Mutations in signalling pathways affect the epidermal growth factor receptor, JEAN CLAUDE, Fyn, and [id number] signalling.   Beta-genus human  papillomaviruses (wart virus) are thought to play a role in SCC arising in immune-suppressed populations. ?-HPV and HPV subtypes 5, 8, 17, 20, 24, and 38 have also been associated with an increased risk of cutaneous SCC in immunocompetent individuals.     What are the clinical features of cutaneous squamous cell carcinoma?  Cutaneous SCCs present as enlarging scaly or crusted lumps. They usually arise within pre-existing actinic keratosis or intraepidermal carcinoma.  They grow over weeks to months   They may ulcerate   They are often tender or painful   Located on sun-exposed sites, particularly the face, lips, ears, hands, forearms and lower legs   Size varies from a few millimetres to several centimetres in diameter.    Types of cutaneous squamous cell carcinoma  Distinct clinical types of invasive cutaneous SCC include:  Cutaneous horn -- the horn is due to excessive production of keratin   Keratoacanthoma (KA) -- a rapidly growing keratinising nodule that may resolve without treatment   Carcinoma cuniculatum (‘verrucous carcinoma’), a slow-growing, warty tumour on the sole of the foot.   Multiple eruptive SCC/KA-like lesions arising in syndromes, such as multiple self-healing squamous epitheliomas of Robert-Smith and Grzybowski syndrome  The pathologist may classify a tumour as well differentiated, moderately well differentiated, poorly differentiated or anaplastic cutaneous SCC. There are other variants.    Classification of squamous cell carcinoma by risk  Cutaneous SCC is classified as low-risk or high-risk, depending on the chance of tumour recurrence and metastasis. Characteristics of high-risk SCC include:  High-risk cutaneous squamous cell carcinoma has the following characteristics:  Diameter greater than or equal to 2 cm   Location on the ear, vermilion of the lip, central face, hands, feet, genitalia   Arising in elderly or immune suppressed patient   Histological thickness greater than 2 mm, poorly  differentiated histology, or with the invasion of the subcutaneous tissue, nerves and blood vessels  Metastatic SCC is found in regional lymph nodes (80%), lungs, liver, brain, bones and skin.    Staging cutaneous squamous cell carcinoma  In 2011, the American Joint Committee on Cancer (AJCC) published a new staging systemic for cutaneous SCC for the 7th Edition of the AJCC manual. This evaluates the dimensions of the original primary tumour (T) and its metastases to lymph nodes (N).    Tumour staging for cutaneous SCC  TX: Th Primary tumour cannot be assessed  T0: No evidence of a primary tumour  Tis: Carcinoma in situ  T1: Tumour <= 2cm without high-risk features  T2: Tumour >= 2cm; or; Tumour <= 2 cm with high-risk features  T3: Tumour with the invasion of maxilla, mandible, orbit or temporal bone  T4: Tumour with the invasion of axial or appendicular skeleton or perineural invasion of skull base    Paul staging for cutaneous SCC  NX: Regional lymph nodes cannot be assessed  N0: No regional lymph node metastasis  N1: Metastasis in one local lymph node <= 3cm  N2: Metastasis in one local lymph node >= 3cm; or; Metastasis in >1 local lymph node <= 6cm  N3: Metastasis in lymph node >= 6cm    How is squamous cell carcinoma diagnosed?  Diagnosis of cutaneous SCC is based on clinical features. The diagnosis and histological subtype are confirmed pathologically by diagnostic biopsy or following excision. See squamous cell carcinoma - pathology.  Patients with high-risk SCC may also undergo staging investigations to determine whether it has spread to lymph nodes or elsewhere. These may include:  Imaging using ultrasound scan, X-rays, CT scans, MRI scans   Lymph node or other tissue biopsies    What is the treatment for cutaneous squamous cell carcinoma?  Cutaneous SCC is nearly always treated surgically. Most cases are excised with a 3-10 mm margin of normal tissue around a visible tumour. A flap or skin graft may be  needed to repair the defect.  Other methods of removal include:  Shave, curettage, and electrocautery for low-risk tumours on trunk and limbs   Aggressive cryotherapy for very small, thin, low-risk tumours   Mohs micrographic surgery for large facial lesions with indistinct margins or recurrent tumours   Radiotherapy for an inoperable tumour, patients unsuitable for surgery, or as adjuvant    What is the treatment for advanced or metastatic squamous cell carcinoma?  Locally advanced primary, recurrent or metastatic SCC requires multidisciplinary consultation. Often a combination of treatments is used.  Surgery   Radiotherapy   Cemiplimab   Experimental targeted therapy using epidermal growth factor receptor inhibitors    How can cutaneous squamous cell carcinoma be prevented?  There is a great deal of evidence to show that very careful sun protection at any time of life reduces the number of SCCs. This is particularly important in ageing, sun-damaged, fair skin; in patients that are immune suppressed; and in those who already have actinic keratoses or previous SCC.  Stay indoors or under the shade in the middle of the day   Wear covering clothing   Apply high protection factor SPF50+ broad-spectrum sunscreens generously to exposed skin if outdoors   Avoid indoor tanning (sun beds, solaria)    Oral nicotinamide (vitamin B3) in a dose of 500 mg twice daily may reduce the number and severity of SCCs in people at high risk.  Patients with multiple squamous cell carcinomas may be prescribed an oral retinoid (acitretin or isotretinoin). These reduce the number of tumours but have some nuisance side effects.    What is the outlook for cutaneous squamous cell carcinoma?  Most SCCs are cured by treatment. A cure is most likely if treatment is undertaken when the lesion is small. The risk of recurrence or disease-associated death is greater for tumours that are > 20 mm in diameter and/or > 2 mm in thickness at the time of  "surgical excision.  About 50% of people at high risk of SCC develop a second one within 5 years of the first. They are also at increased risk of other skin cancers, especially melanoma. Regular self-skin examinations and long-term annual skin checks by an experienced health professional are recommended.   MELANOCYTIC NEVI (\"Moles\")    Physical Exam:  Anatomic Location Affected:   Mostly on sun-exposed areas of the trunk and extremities  Morphological Description:  Scattered, 1-4mm round to ovoid, symmetrical-appearing, even bordered, skin colored to dark brown macules/papules, mostly in sun-exposed areas  Pertinent Positives:  Pertinent Negatives:    Additional History of Present Condition:      Assessment and Plan:  Based on a thorough discussion of this condition and the management approach to it (including a comprehensive discussion of the known risks, side effects and potential benefits of treatment), the patient (family) agrees to implement the following specific plan:  When outside we recommend using a wide brim hat, sunglasses, long sleeve and pants, sunscreen with SPF 30+ with reapplication every 2 hours, or SPF specific clothing   Benign, reassured  Annual skin check     Melanocytic Nevi  Melanocytic nevi (\"moles\") are tan or brown, raised or flat areas of the skin which have an increased number of melanocytes. Melanocytes are the cells in our body which make pigment and account for skin color.    Some moles are present at birth (I.e., \"congenital nevi\"), while others come up later in life (i.e., \"acquired nevi\").  The sun can stimulate the body to make more moles.  Sunburns are not the only thing that triggers more moles.  Chronic sun exposure can do it too.     Clinically distinguishing a healthy mole from melanoma may be difficult, even for experienced dermatologists. The \"ABCDE's\" of moles have been suggested as a means of helping to alert a person to a suspicious mole and the possible increased risk of " "melanoma.  The suggestions for raising alert are as follows:    Asymmetry: Healthy moles tend to be symmetric, while melanomas are often asymmetric.  Asymmetry means if you draw a line through the mole, the two halves do not match in color, size, shape, or surface texture. Asymmetry can be a result of rapid enlargement of a mole, the development of a raised area on a previously flat lesion, scaling, ulceration, bleeding or scabbing within the mole.  Any mole that starts to demonstrate \"asymmetry\" should be examined promptly by a board certified dermatologist.     Border: Healthy moles tend to have discrete, even borders.  The border of a melanoma often blends into the normal skin and does not sharply delineate the mole from normal skin.  Any mole that starts to demonstrate \"uneven borders\" should be examined promptly by a board certified dermatologist.     Color: Healthy moles tend to be one color throughout.  Melanomas tend to be made up of different colors ranging from dark black, blue, white, or red.  Any mole that demonstrates a color change should be examined promptly by a board certified dermatologist.     Diameter: Healthy moles tend to be smaller than 0.6 cm in size; an exception are \"congenital nevi\" that can be larger.  Melanomas tend to grow and can often be greater than 0.6 cm (1/4 of an inch, or the size of a pencil eraser). This is only a guideline, and many normal moles may be larger than 0.6 cm without being unhealthy.  Any mole that starts to change in size (small to bigger or bigger to smaller) should be examined promptly by a board certified dermatologist.     Evolving: Healthy moles tend to \"stay the same.\"  Melanomas may often show signs of change or evolution such as a change in size, shape, color, or elevation.  Any mole that starts to itch, bleed, crust, burn, hurt, or ulcerate or demonstrate a change or evolution should be examined promptly by a board certified dermatologist.  "       LENTIGO    Physical Exam:  Anatomic Location Affected:  trunk, arms  Morphological Description:  Light brown macules  Pertinent Positives:  Pertinent Negatives:    Additional History of Present Condition:      Assessment and Plan:  Based on a thorough discussion of this condition and the management approach to it (including a comprehensive discussion of the known risks, side effects and potential benefits of treatment), the patient (family) agrees to implement the following specific plan:  When outside we recommend using a wide brim hat, sunglasses, long sleeve and pants, sunscreen with SPF 30+ with reapplication every 2 hours, or SPF specific clothing       What is a lentigo?  A lentigo is a pigmented flat or slightly raised lesion with a clearly defined edge. Unlike an ephelis (freckle), it does not fade in the winter months. There are several kinds of lentigo.  The name lentigo originally referred to its appearance resembling a small lentil. The plural of lentigo is lentigines, although “lentigos” is also in common use.    Who gets lentigines?  Lentigines can affect males and females of all ages and races. Solar lentigines are especially prevalent in fair skinned adults. Lentigines associated with syndromes are present at birth or arise during childhood.    What causes lentigines?  Common forms of lentigo are due to exposure to ultraviolet radiation:  Sun damage including sunburn   Indoor tanning   Phototherapy, especially photochemotherapy (PUVA)    Ionizing radiation, eg radiation therapy, can also cause lentigines.  Several familial syndromes associated with widespread lentigines originate from mutations in Aung-MAP kinase, mTOR signaling and PTEN pathways.    What is the treatment for lentigines?  Most lentigines are left alone. Attempts to lighten them may not be successful. The following approaches are used:  SPF 50+ broad-spectrum sunscreen   Hydroquinone bleaching cream   Alpha hydroxy acids  "  Vitamin C   Retinoids   Azelaic acid   Chemical peels  Individual lesions can be permanently removed using:  Cryotherapy   Intense pulsed light   Pigment lasers    How can lentigines be prevented?  Lentigines associated with exposure ultraviolet radiation can be prevented by very careful sun protection. Clothing is more successful at preventing new lentigines than are sunscreens.    What is the outlook for lentigines?  Lentigines usually persist. They may increase in number with age and sun exposure. Some in sun-protected sites may fade and disappear.    RINALDI ANGIOMAS    Physical Exam:  Anatomic Location Affected:  trunk  Morphological Description:  Scattered cherry red, 1-4 mm papules.  Pertinent Positives:  Pertinent Negatives:    Additional History of Present Condition:      Assessment and Plan:  Based on a thorough discussion of this condition and the management approach to it (including a comprehensive discussion of the known risks, side effects and potential benefits of treatment), the patient (family) agrees to implement the following specific plan:  Monitor for changes  Benign, reassured      Assessment and Plan:    Cherry angioma, also known as Mcclelland de Tunde spots, are benign vascular skin lesions. A \"cherry angioma\" is a firm red, blue or purple papule, 0.1-1 cm in diameter. When thrombosed, they can appear black in colour until evaluated with a dermatoscope when the red or purple colour is more easily seen. Cherry angioma may develop on any part of the body but most often appear on the scalp, face, lips and trunk.  An angioma is due to proliferating endothelial cells; these are the cells that line the inside of a blood vessel.    Angiomas can arise in early life or later in life; the most common type of angioma is a cherry angioma.  Cherry angiomas are very common in males and females of any age or race. They are more noticeable in white skin than in skin of colour. They markedly increase in " "number from about the age of 40. There may be a family history of similar lesions. Eruptive cherry angiomas have been rarely reported to be associated with internal malignancy. The cause of angiomas is unknown. Genetic analysis of cherry angiomas has shown that they frequently carry specific somatic missense mutations in the GNAQ and GNA11 (Q209H) genes, which are involved in other vascular and melanocytic proliferations.      SEBORRHEIC KERATOSIS; NON-INFLAMED    Physical Exam:  Anatomic Location Affected:  trunk  Morphological Description:  Flat and raised, waxy, smooth to warty textured, yellow to brownish-grey to dark brown to blackish, discrete, \"stuck-on\" appearing papules.  Pertinent Positives:  Pertinent Negatives:    Additional History of Present Condition:      Assessment and Plan:  Based on a thorough discussion of this condition and the management approach to it (including a comprehensive discussion of the known risks, side effects and potential benefits of treatment), the patient (family) agrees to implement the following specific plan:  Monitor for changes  Benign, reassured      Seborrheic Keratosis  A seborrheic keratosis is a harmless warty spot that appears during adult life as a common sign of skin aging.  Seborrheic keratoses can arise on any area of skin, covered or uncovered, with the usual exception of the palms and soles. They do not arise from mucous membranes. Seborrheic keratoses can have highly variable appearance.      Seborrheic keratoses are extremely common. It has been estimated that over 90% of adults over the age of 60 years have one or more of them. They occur in males and females of all races, typically beginning to erupt in the 30s or 40s. They are uncommon under the age of 20 years.  The precise cause of seborrhoeic keratoses is not known.  Seborrhoeic keratoses are considered degenerative in nature. As time goes by, seborrheic keratoses tend to become more numerous. Some " "people inherit a tendency to develop a very large number of them; some people may have hundreds of them.      There is no easy way to remove multiple lesions on a single occasion.  Unless a specific lesion is \"inflamed\" and is causing pain or stinging/burning or is bleeding, most insurance companies do not authorize treatment.    XEROSIS (\"DRY SKIN\")    :      Assessment and Plan:  Based on a thorough discussion of this condition and the management approach to it (including a comprehensive discussion of the known risks, side effects and potential benefits of treatment), the patient (family) agrees to implement the following specific plan:  Use moisturizer like Eucerin,Cerave or Aveeno Cream 3 times a day for the dry skin            Dry skin refers to skin that feels dry to touch. Dry skin has a dull surface with a rough, scaly quality. The skin is less pliable and cracked. When dryness is severe, the skin may become inflamed and fissured.  Although any body site can be dry, dry skin tends to affect the shins more than any other site.    Dry skin is lacking moisture in the outer horny cell layer (stratum corneum) and this results in cracks in the skin surface.  Dry skin is also called xerosis, xeroderma or asteatosis (lack of fat).  It can affect males and females of all ages. There is some racial variability in water and lipid content of the skin.  Dry skin that starts in early childhood may be one of about 20 types of ichthyosis (fish-scale skin). There is often a family history of dry skin.   Dry skin is commonly seen in people with atopic dermatitis.  Nearly everyone > 60 years has dry skin.    Dry skin that begins later may be seen in people with certain diseases and conditions.  Postmenopausal women  Hypothyroidism  Chronic renal disease   Malnutrition and weight loss   Subclinical dermatitis   Treatment with certain drugs such as oral retinoids, diuretics and epidermal growth factor receptor " inhibitors      What is the treatment for dry skin?  The mainstay of treatment of dry skin and ichthyosis is moisturisers/emollients. They should be applied liberally and often enough to:  Reduce itch   Improve the barrier function   Prevent entry of irritants, bacteria   Reduce transepidermal water loss.      How can dry skin be prevented?  Eliminate aggravating factors:  Reduce the frequency of bathing.   A humidifier in winter and air conditioner in summer   Compare having a short shower with a prolonged soak in a bath.   Use lukewarm, not hot, water.   Replace standard soap with a substitute such as a synthetic detergent cleanser, water-miscible emollient, bath oil, anti-pruritic tar oil, colloidal oatmeal etc.   Apply an emollient liberally and often, particularly shortly after bathing, and when itchy. The drier the skin, the thicker this should be, especially on the hands.    What is the outlook for dry skin?  A tendency to dry skin may persist life-long, or it may improve once contributing factors are controlled.     ACTINIC KERATOSIS        Assessment and Plan:  Based on a thorough discussion of this condition and the management approach to it (including a comprehensive discussion of the known risks, side effects and potential benefits of treatment), the patient (family) agrees to implement the following specific plan:  When outside we recommend using a wide brim hat, sunglasses, long sleeve and pants, sunscreen with SPF 30+ with reapplication every 2 hours, or SPF specific clothing   liquid nitrogen to treat areas. Consent obtained. Expect area to blister, crust, and then fall off within 2 weeks. Please use vaseline.                                     PROCEDURE:  DESTRUCTION OF PRE-MALIGNANT LESIONS  After a thorough discussion of treatment options and risk/benefits/alternatives (including but not limited to local pain, scarring, dyspigmentation, blistering, and possible superinfection), verbal and written  consent were obtained and the aforementioned lesions were treated on with cryotherapy using liquid nitrogen x 1 cycle for 5-10 seconds.    TOTAL NUMBER of 11 pre-malignant lesions were treated today on the ANATOMIC LOCATION: scalp, forehead, nose.     The patient tolerated the procedure well, and after-care instructions were provided.

## 2024-10-24 ENCOUNTER — TELEPHONE (OUTPATIENT)
Dept: RADIATION ONCOLOGY | Facility: CLINIC | Age: 79
End: 2024-10-24

## 2024-10-24 ENCOUNTER — OFFICE VISIT (OUTPATIENT)
Dept: SURGERY | Facility: CLINIC | Age: 79
End: 2024-10-24

## 2024-10-24 VITALS
WEIGHT: 171 LBS | HEART RATE: 53 BPM | HEIGHT: 70 IN | TEMPERATURE: 97.6 F | DIASTOLIC BLOOD PRESSURE: 38 MMHG | BODY MASS INDEX: 24.48 KG/M2 | OXYGEN SATURATION: 96 % | SYSTOLIC BLOOD PRESSURE: 140 MMHG

## 2024-10-24 DIAGNOSIS — C44.729 SQUAMOUS CELL CARCINOMA OF LEFT LOWER LEG: Primary | ICD-10-CM

## 2024-10-24 PROCEDURE — 99024 POSTOP FOLLOW-UP VISIT: CPT | Performed by: SURGERY

## 2024-10-24 NOTE — TELEPHONE ENCOUNTER
RAD ONC - LM on VM identified self and reason for call.  F/U 11/29/24 w/ Dr. Herrera at the [facility] will need to be r/s...KD

## 2024-10-24 NOTE — LETTER
October 24, 2024     Simón Hadley MD  [address] [facility] [address]    Patient: Jay Roach Jr.   YOB: 1945   Date of Visit: 10/24/2024       Dear Dr. Hadley:    Thank you for referring Jay Roach to me for evaluation. Below are my notes for this consultation.    If you have questions, please do not hesitate to call me. I look forward to following your patient along with you.         Sincerely,        Robert Bloch, MD        CC: No Recipients

## 2024-10-24 NOTE — PROGRESS NOTES
The patient returns.  He is putting silver cell on on a daily basis.  On examination the wound is closing.  This is now slightly less than 2 cm by about 9 mm with a granulation base.  I told him to continue what he is doing and I will see him in a few weeks time

## 2024-11-06 ENCOUNTER — OFFICE VISIT (OUTPATIENT)
Dept: NEPHROLOGY | Facility: CLINIC | Age: 79
End: 2024-11-06
Payer: MEDICARE

## 2024-11-06 VITALS
OXYGEN SATURATION: 99 % | SYSTOLIC BLOOD PRESSURE: 136 MMHG | DIASTOLIC BLOOD PRESSURE: 58 MMHG | BODY MASS INDEX: 24.48 KG/M2 | HEART RATE: 52 BPM | HEIGHT: 70 IN | WEIGHT: 171 LBS

## 2024-11-06 DIAGNOSIS — R80.1 PERSISTENT PROTEINURIA, UNSPECIFIED: ICD-10-CM

## 2024-11-06 DIAGNOSIS — N18.4 CKD (CHRONIC KIDNEY DISEASE) STAGE 4, GFR 15-29 ML/MIN (HCC): Primary | ICD-10-CM

## 2024-11-06 DIAGNOSIS — E55.9 VITAMIN D DEFICIENCY: ICD-10-CM

## 2024-11-06 DIAGNOSIS — N18.4 BENIGN HYPERTENSION WITH CKD (CHRONIC KIDNEY DISEASE) STAGE IV (HCC): ICD-10-CM

## 2024-11-06 DIAGNOSIS — N25.81 SECONDARY HYPERPARATHYROIDISM OF RENAL ORIGIN (HCC): ICD-10-CM

## 2024-11-06 DIAGNOSIS — D50.9 IRON DEFICIENCY ANEMIA, UNSPECIFIED IRON DEFICIENCY ANEMIA TYPE: ICD-10-CM

## 2024-11-06 DIAGNOSIS — N20.0 NEPHROLITHIASIS: ICD-10-CM

## 2024-11-06 DIAGNOSIS — E11.22 CONTROLLED TYPE 2 DIABETES MELLITUS WITH STAGE 4 CHRONIC KIDNEY DISEASE, WITHOUT LONG-TERM CURRENT USE OF INSULIN (HCC): ICD-10-CM

## 2024-11-06 DIAGNOSIS — N18.4 CONTROLLED TYPE 2 DIABETES MELLITUS WITH STAGE 4 CHRONIC KIDNEY DISEASE, WITHOUT LONG-TERM CURRENT USE OF INSULIN (HCC): ICD-10-CM

## 2024-11-06 DIAGNOSIS — I50.32 CHRONIC DIASTOLIC CHF (CONGESTIVE HEART FAILURE) (HCC): ICD-10-CM

## 2024-11-06 DIAGNOSIS — I12.9 BENIGN HYPERTENSION WITH CKD (CHRONIC KIDNEY DISEASE) STAGE IV (HCC): ICD-10-CM

## 2024-11-06 PROCEDURE — G2211 COMPLEX E/M VISIT ADD ON: HCPCS | Performed by: INTERNAL MEDICINE

## 2024-11-06 PROCEDURE — 99214 OFFICE O/P EST MOD 30 MIN: CPT | Performed by: INTERNAL MEDICINE

## 2024-11-06 RX ORDER — FERROUS SULFATE 325(65) MG
325 TABLET ORAL EVERY OTHER DAY
COMMUNITY

## 2024-11-06 NOTE — PATIENT INSTRUCTIONS
-Renal Function is stable   -You have Chronic Kidney Disease Stage stable   -Avoid NSAIDs like Ibuprofen/Motrin, Naproxen/Aleve, Celebrex, meloxicam/Mobic, Diclofenac and other NSAIDs.  -Okay to take Acetaminophen/Tylenol if you do not have any liver problems  -Avoid IV contrast used for CT scan and cardiac catheterization.    -If plan for any study with IV contrast, please let me know so we could hydrate with fluids before and after IV contrast  -Dosage  of certain medications may need to be adjusted depending on Kidney function.     Blood pressure is stable    -Recommend 2 g sodium diet.    -Recommend daily exercise and weight loss  -Discussed home monitoring of BP and maintaining a log of blood pressure.  -Recommend goal BP less than 130/80. Please inform me if SBP below 110 or above 140's persistently.    Started on Jardiance 10 mg daily, check blood work in a month    Follow up: 4  months with repeat Lab work within a week of the scheduled office visit. Will discuss the results of the previsit Labs during the office visit.

## 2024-11-06 NOTE — PROGRESS NOTES
NEPHROLOGY OUTPATIENT PROGRESS NOTE   Jay Roach Jr. 79 y.o. 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     -Plan:   Renal function is stable last blood work showed creatinine 2.22 mg/dL.  Possible slight increase in creatinine is due to initiation of lisinopril during the last office visit.  Stressed on oral hydration.  Continue current dose of torsemide.  Continue to monitor renal function  Started on Jardiance 10 mg daily to slow CKD progression.  Repeat BMP in a month  Last LDL at target range, on Lipitor  CKD Education: not interested.  He mentions has been watching [employer] videos. Still not interested   Recommend avoiding metformin as GFR less than 30  Has record of living will in the system  Follow up in 4 months        Primary Hypertension with chronic kidney disease stage 4:   -Current medication:  Amlodipine 10 mg daily, Lisinopril 5 mg daily, Metoprolol 12.5 mg daily, Torsemide 10 mg , hydralazine 25 mg bid   -Current blood pressure:  stable and at goal, Continue same medications   -Recommend 2 g sodium diet.    -Recommend daily exercise and weight loss  -Discussed home monitoring of BP and maintaining a log of blood pressure.  -Recommend goal BP less than 130/80.      Chronic diastolic CHF/severe aortic stenosis status post TAVR  -Echo from 6/2023- was EF 65 %. Diastolic dysfunction  -Clinically euvolemic, continue torsemide 10 mg daily  -Recommend fluid restriction 1.8 L/min  - recommend taking extra torsemide in future if any weight gain more than 3 lb in a day or 5 lb in a week and to follow low-sodium diet     Proteinuria,  persistent  -UPC ratio worsened to 0.6 despite use of lisinopril.  Also albumin creatinine ratio 337 mg/g   -likely due to hypertensive nephrosclerosis  -Continue lisinopril which was started during the last office visit.  Also started on SGLT2 inhibitor today which should help  -last A1c 7.3     Hypomagnesemia- resolved   -mag wnl   -last Mag level wnl- taking multivitamin  -likely due to use of PPI      Secondary hyperparathyroidism of renal origin/ Vitamin D deficiency  -PTH improved to 99.3  - Continue calcitriol 0.25 mcg daily  -vitamin D  wnl at 32.8,   Okay to take OTC vitamin .       Anemia due to CKD stage 4 and due to iron deficiency  -hb improving to  10.7 g/dl , iron sat 23 %   -on oral iron tablets 325 mg ferrous sulfate - decrease to EOD , OTC      B/L nephrolithiasis/left renal cyst  -currently asymptomatic  -reviewed CT abdomen report from April 2022, finding of nonobstructing bilateral nephrolithiasis.  Largest 8 mm inferior pole of left kidney.   -CT from October 2024-.  Found to have hyperdense lesion in the pancreatic head which is likely benign.2 adjacent left renal lower pole nonobstructing calyceal stones, measuring 9 mm and 10 mm.   -Pt asymptomatic.  - no monitoring needed for renal cyst as it was a simple parapelvic cyst. Not mentioned about  Renal cyst.       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 Note plan radiation     Smoking/tobacco use, still smoking. Stressed  on quitting      Hyperuricemia  -on allopurinol - 300 mg daily   -uric acid improved to 4.1   -low purine diet .     Patient Instructions   -Renal Function is stable   -You have Chronic Kidney Disease Stage stable   -Avoid NSAIDs like Ibuprofen/Motrin, Naproxen/Aleve, Celebrex, meloxicam/Mobic, Diclofenac and other  NSAIDs.  -Okay to take Acetaminophen/Tylenol if you do not have any liver problems  -Avoid IV contrast used for CT scan and cardiac catheterization.    -If plan for any study with IV contrast, please let me know so we could hydrate with fluids before and after IV contrast  -Dosage  of certain medications may need to be adjusted depending on Kidney function.     Blood pressure is stable    -Recommend 2 g sodium diet.    -Recommend daily exercise and weight loss  -Discussed home monitoring of BP and maintaining a log of blood pressure.  -Recommend goal BP less than 130/80. Please inform me if SBP below 110 or above 140's persistently.    Started on Jardiance 10 mg daily, check blood work in a month    Follow up: 4  months with repeat Lab work within a week of the scheduled office visit. Will discuss the results of the previsit Labs during the office visit.       Diagnoses and all orders for this visit:    CKD (chronic kidney disease) stage 4, GFR 15-29 ml/min ([facility])  -     Basic metabolic panel; Future  -     PTH, intact; Future  -     Protein / creatinine ratio, urine; Future  -     CBC; Future  -     Magnesium; Future  -     Phosphorus; Future  -     Uric acid; Future  -     Comprehensive metabolic panel; Future  -     Empagliflozin (Jardiance) 10 MG TABS tablet; Take 1 tablet (10 mg total) by mouth every morning    Benign hypertension with CKD (chronic kidney disease) stage IV ([facility])  -     Comprehensive metabolic panel; Future    Persistent proteinuria, unspecified  -     Protein / creatinine ratio, urine; Future  -     Empagliflozin (Jardiance) 10 MG TABS tablet; Take 1 tablet (10 mg total) by mouth every morning    Chronic diastolic CHF (congestive heart failure) ([facility])  -     Comprehensive metabolic panel; Future    Secondary hyperparathyroidism of renal origin ([facility])  -     PTH, intact; Future    Vitamin D deficiency    Iron deficiency anemia, unspecified iron deficiency anemia type  -     CBC;  Future    Nephrolithiasis  -     Comprehensive metabolic panel; Future    Controlled type 2 diabetes mellitus with stage 4 chronic kidney disease, without long-term current use of insulin (HCC)  -     Protein / creatinine ratio, urine; Future  -     Comprehensive metabolic panel; Future  -     Empagliflozin (Jardiance) 10 MG TABS tablet; Take 1 tablet (10 mg total) by mouth every morning    Other orders  -     ferrous sulfate 325 (65 Fe) mg tablet; Take 325 mg by mouth every other day                SUBJECTIVE / HPI:  Jay Roach Jr. is a 79 y.o.  male  with medical issues of chronic kidney disease, HTN x 15 yrs,  DM-2 anemia, colitis  , CAD s/p CABG who presents for follow-up of chronic kidney disease stage 3.     Review of records, patient has elevated creatinine dating back to 2018 November when the creatinine was 1.3.  It was stable at 1.4 to 1.7 in 2020.  Since June 2021 creatinine has been around 1.6-1.8  It had worsened to creatinine 2.49 on 12/14/21, likely prerenal in the setting of abdominal pain and diarrhea and renal function improved to creatinine 1.7-1.8 in January 2022.     Patient with baseline creatinine 1.6-1.8 he was at [facility] and underwent cardiac PCI on 05/24/22.  Creatinine since May 2022 has been around 2.3-2.4, improved to 2.0 on 05/25 likely due to hydration during PCI.     He was again admitted from June 14 to June 17,2022 underwent TAVR on June 14th 2022.     He was admitted in January 2024 to [facility] for GI bleed, underwent EGD and found to have gastric ulcers which were clipped.  Had acute kidney injury likely prerenal during the admission    He was admitted from 4/17 to 4/19/2024 for complaint of shortness of breath/hemoptysis after receiving IR guided lung biopsy for mass found in the left lung as outpatient.  Found to have non small cell lung cancer- completed radiation.    Reviewed labs from [facility] renal function recently has been at creatinine 1.8  to 2.2 mg/dL which is likely the new baseline since 2024.  Had acute kidney injury in January 2024    Last blood work from 10/6/2024 showed creatinine 2.22 mg/dL with stable electrolytes.  Hemoglobin 10.7 g/dL last PTH from September was improving to 99.3 and last A1c was 7.3  .  Last UPC ratio slightly worsened to 0.6  No new complaints  No SOB or chest pain         REVIEW OF SYSTEMS:    Review of Systems   Constitutional:  Negative for activity change, appetite change, chills, diaphoresis, fatigue and fever.   HENT:  Negative for congestion, facial swelling and nosebleeds.    Eyes:  Negative for pain and visual disturbance.   Respiratory:  Negative for cough, chest tightness and shortness of breath.    Cardiovascular:  Negative for chest pain and palpitations.   Gastrointestinal:  Negative for abdominal distention, abdominal pain, diarrhea, nausea and vomiting.   Genitourinary:  Negative for difficulty urinating, dysuria, flank pain, frequency and hematuria.   Musculoskeletal:  Negative for arthralgias, back pain and joint swelling.   Skin:  Negative for rash.   Neurological:  Negative for dizziness, seizures, syncope, weakness and headaches.   Psychiatric/Behavioral:  Negative for agitation and confusion. The patient is not nervous/anxious.        More than 10 point review of systems were obtained and discussed in length with the patient. Complete review of systems were negative / unremarkable except mentioned above.       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 ([facility]) 09/10/2021    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 ([facility]) 02/24/2023    Basal cell carcinoma     right cheek    CAD (coronary artery disease)     Carotid stenosis, asymptomatic, bilateral     Chronic kidney disease     Colon polyp     Dependence on respirator (ventilator) status ([facility])  04/07/2023    Diabetes (HCC)     type 2, non-insulin dependent    Diabetes mellitus (HCC)     GERD (gastroesophageal reflux disease)     History of nephrolithiasis     Hyperlipidemia     Hypertension     Left foot pain 11/30/2022    Lung cancer (HCC)     PAD (peripheral artery disease) (HCC)     Severe aortic stenosis     Squamous cell skin cancer 11/22/2022    left superior helix       PAST SURGICAL HISTORY:  Past Surgical History:   Procedure Laterality Date    APPENDECTOMY      CARDIAC CATHETERIZATION N/A 05/05/2022    Procedure: CARDIAC RHC/LHC;  Surgeon: Arvin Sanchez DO;  Location: [facility];  Service: Cardiology    CARDIAC CATHETERIZATION N/A 05/05/2022    Procedure: Cardiac Coronary Angiogram;  Surgeon: Arvin Sanchez DO;  Location: [facility];  Service: Cardiology    CARDIAC CATHETERIZATION N/A 05/24/2022    Procedure: Cardiac pci;  Surgeon: Damien Jeter MD;  Location: [facility];  Service: Cardiology    CARDIAC CATHETERIZATION N/A 06/14/2022    Procedure: CARDIAC TAVR;  Surgeon: Nahum Vza MD;  Location: [facility];  Service: Cardiology    COLECTOMY      COLONOSCOPY  2013    CORONARY ARTERY BYPASS GRAFT  2013    X 2    FEMORAL ARTERY - POPLITEAL ARTERY BYPASS GRAFT      HEMORRHOID SURGERY      IR AORTAGRAM WITH RUN-OFF  11/19/2018    IR AORTAGRAM WITH RUN-OFF  03/02/2023    IR BIOPSY LUNG  4/17/2024    IR LOWER EXTREMITY ANGIOGRAM  03/23/2023    MOHS SURGERY Left 01/11/2023    SCC left superior helix-Dr Tovar    MN SLCTV CATHJ 3RD+ ORD SLCTV ABDL PEL/LXTR BRNCH Left 08/12/2016    Procedure: LEFT FEMORAL ARTERIOGRAM; BALLOON ANGIOPLASTY; SFA  AND FEMORAL AT VEIN GRAFT;  Surgeon: Nicholas Urena MD;  Location: [facility];  Service: Vascular    MN TEAEC W/WO PATCH GRAFT COMMON FEMORAL Right 03/23/2023    Procedure: Common femoral endarterectomy&antegrade intervention of SFA, popliteal artery w/ shockwave;  Surgeon: Eagle Higuera MD;  Location: [facility];   Service: Vascular    RI TRANSCATHETER TRANSAPICAL REPLACEMT AORTIC VALVE N/A 06/14/2022    Procedure: REPLACEMENT AORTIC VALVE TRANSCATHETER (TAVR) TRANSAPICAL 29MM IRVIN KYLER S3 ULTRA VALVE(ACCESS ON LEFT) ELANA;  Surgeon: Amarjit Gordon DO;  Location: [facility];  Service: Cardiac Surgery    SKIN CANCER EXCISION      TONSILLECTOMY AND ADENOIDECTOMY      UPPER GASTROINTESTINAL ENDOSCOPY         SOCIAL HISTORY:  Social History     Substance and Sexual Activity   Alcohol Use Not Currently    Comment: rare     Social History     Substance and Sexual Activity   Drug Use No     Social History     Tobacco Use   Smoking Status Every Day    Current packs/day: 1.00    Average packs/day: 0.6 packs/day for 100.0 years (62.5 ttl pk-yrs)    Types: Cigarettes    Passive exposure: Current   Smokeless Tobacco Never   Tobacco Comments    4 cigarettes per day       FAMILY HISTORY:  Family History   Problem Relation Age of Onset    Heart attack Father     Other Sister         bypass and vlave replacement    Stroke Paternal Uncle     Arrhythmia Neg Hx     Asthma Neg Hx     Clotting disorder Neg Hx     Fainting Neg Hx     Anuerysm Neg Hx     Hypertension Neg Hx         unsure     Hyperlipidemia Neg Hx     Heart failure Neg Hx        MEDICATIONS:    Current Outpatient Medications:     allopurinol (ZYLOPRIM) 300 mg tablet, TAKE 1 TABLET DAILY, Disp: 90 tablet, Rfl: 1    amLODIPine (NORVASC) 10 mg tablet, TAKE 1 TABLET DAILY, Disp: 90 tablet, Rfl: 3    atorvastatin (LIPITOR) 80 mg tablet, TAKE 1 TABLET DAILY AT     BEDTIME, Disp: 90 tablet, Rfl: 3    calcitriol (ROCALTROL) 0.25 mcg capsule, Take 1 capsule (0.25 mcg total) by mouth daily, Disp: 90 capsule, Rfl: 4    clopidogrel (PLAVIX) 75 mg tablet, Take 1 tablet (75 mg total) by mouth daily, Disp: 90 tablet, Rfl: 0    Empagliflozin (Jardiance) 10 MG TABS tablet, Take 1 tablet (10 mg total) by mouth every morning, Disp: 90 tablet, Rfl: 3    ferrous sulfate 325 (65 Fe) mg tablet,  "Take 325 mg by mouth every other day, Disp: , Rfl:     glimepiride (AMARYL) 1 mg tablet, Take 1 tablet (1 mg total) by mouth daily with breakfast, Disp: 90 tablet, Rfl: 1    hydrALAZINE (APRESOLINE) 25 mg tablet, Take 1 tablet (25 mg total) by mouth 2 (two) times a day, Disp: 180 tablet, Rfl: 3    lisinopril (ZESTRIL) 5 mg tablet, Take 1 tablet (5 mg total) by mouth daily, Disp: 90 tablet, Rfl: 3    metoprolol succinate (TOPROL-XL) 25 mg 24 hr tablet, Take 0.5 tablets (12.5 mg total) by mouth daily, Disp: 90 tablet, Rfl: 1    pancrelipase, Lip-Prot-Amyl, (CREON) 24,000 units, Take 24,000 units of lipase by mouth 3 (three) times a day with meals (Patient taking differently: Take 24,000 units of lipase by mouth 2 (two) times a day with meals), Disp: 360 capsule, Rfl: 1    pantoprazole (PROTONIX) 40 mg tablet, Take 1 tablet (40 mg total) by mouth 2 (two) times a day, Disp: 180 tablet, Rfl: 1    torsemide (DEMADEX) 20 mg tablet, TAKE 0.5 TABLETS (10 MG TOTAL) BY MOUTH DAILY TAKES 10 MG ONCE A DAY., Disp: 90 tablet, Rfl: 3    bacitracin topical ointment 500 units/g topical ointment, Apply 1 large application topically 2 (two) times a day for 7 days, Disp: 28 g, Rfl: 0      PHYSICAL EXAM:  Vitals:    11/06/24 1028   BP: 136/58   BP Location: Left arm   Patient Position: Sitting   Cuff Size: Standard   Pulse: (!) 52   SpO2: 99%   Weight: 77.6 kg (171 lb)   Height: 5' 10\" (1.778 m)     Body mass index is 24.54 kg/m².    Physical Exam  Constitutional:       General: He is not in acute distress.     Appearance: He is well-developed. He is not diaphoretic.   HENT:      Head: Normocephalic and atraumatic.      Mouth/Throat:      Mouth: Mucous membranes are moist.   Eyes:      General: No scleral icterus.     Conjunctiva/sclera: Conjunctivae normal.      Pupils: Pupils are equal, round, and reactive to light.   Neck:      Thyroid: No thyromegaly.   Cardiovascular:      Rate and Rhythm: Normal rate and regular rhythm.      Heart " sounds: Murmur heard.      No friction rub.   Pulmonary:      Effort: Pulmonary effort is normal. No respiratory distress.      Breath sounds: Normal breath sounds. No wheezing or rales.   Abdominal:      General: Bowel sounds are normal. There is no distension.      Palpations: Abdomen is soft.      Tenderness: There is no abdominal tenderness.   Musculoskeletal:         General: No deformity.      Cervical back: Neck supple.      Right lower leg: No edema.      Left lower leg: No edema.   Lymphadenopathy:      Cervical: No cervical adenopathy.   Skin:     Coloration: Skin is not pale.      Nails: There is no clubbing.   Neurological:      Mental Status: He is alert and oriented to person, place, and time. He is not disoriented.   Psychiatric:         Mood and Affect: Mood normal. Mood is not anxious. Affect is not inappropriate.         Behavior: Behavior normal.         Thought Content: Thought content normal.         Lab Results:   Results for orders placed or performed during the hospital encounter of 10/06/24   POCT occult blood stool    Collection Time: 10/06/24  2:34 PM   Result Value Ref Range    EXT Fecal Occult Blood Positive (A) Negative    Control Valid Valid   CBC and differential    Collection Time: 10/06/24  2:39 PM   Result Value Ref Range    WBC 6.89 4.31 - 10.16 Thousand/uL    RBC 3.46 (L) 3.88 - 5.62 Million/uL    Hemoglobin 10.7 (L) 12.0 - 17.0 g/dL    Hematocrit 33.0 (L) 36.5 - 49.3 %    MCV 95 82 - 98 fL    MCH 30.9 26.8 - 34.3 pg    MCHC 32.4 31.4 - 37.4 g/dL    RDW 16.1 (H) 11.6 - 15.1 %    MPV 10.5 8.9 - 12.7 fL    Platelets 151 149 - 390 Thousands/uL    nRBC 0 /100 WBCs    Segmented % 81 (H) 43 - 75 %    Immature Grans % 0 0 - 2 %    Lymphocytes % 11 (L) 14 - 44 %    Monocytes % 6 4 - 12 %    Eosinophils Relative 2 0 - 6 %    Basophils Relative 0 0 - 1 %    Absolute Neutrophils 5.61 1.85 - 7.62 Thousands/µL    Absolute Immature Grans 0.02 0.00 - 0.20 Thousand/uL    Absolute Lymphocytes  0.73 0.60 - 4.47 Thousands/µL    Absolute Monocytes 0.40 0.17 - 1.22 Thousand/µL    Eosinophils Absolute 0.11 0.00 - 0.61 Thousand/µL    Basophils Absolute 0.02 0.00 - 0.10 Thousands/µL   Comprehensive metabolic panel    Collection Time: 10/06/24  2:39 PM   Result Value Ref Range    Sodium 137 135 - 147 mmol/L    Potassium 4.2 3.5 - 5.3 mmol/L    Chloride 105 96 - 108 mmol/L    CO2 24 21 - 32 mmol/L    ANION GAP 8 4 - 13 mmol/L    BUN 39 (H) 5 - 25 mg/dL    Creatinine 2.22 (H) 0.60 - 1.30 mg/dL    Glucose 183 (H) 65 - 140 mg/dL    Calcium 9.3 8.4 - 10.2 mg/dL    AST 13 13 - 39 U/L    ALT 7 7 - 52 U/L    Alkaline Phosphatase 80 34 - 104 U/L    Total Protein 6.9 6.4 - 8.4 g/dL    Albumin 3.9 3.5 - 5.0 g/dL    Total Bilirubin 0.45 0.20 - 1.00 mg/dL    eGFR 26 ml/min/1.73sq m   Protime-INR    Collection Time: 10/06/24  2:39 PM   Result Value Ref Range    Protime 13.6 12.3 - 15.0 seconds    INR 0.97 0.85 - 1.19   APTT    Collection Time: 10/06/24  2:39 PM   Result Value Ref Range    PTT 27 23 - 34 seconds   Type and screen    Collection Time: 10/06/24  2:39 PM   Result Value Ref Range    ABO Grouping A     Rh Factor Positive     Antibody Screen Negative     Specimen Expiration Date 20241009    UA w Reflex to Microscopic w Reflex to Culture    Collection Time: 10/06/24  3:54 PM    Specimen: Urine, Clean Catch   Result Value Ref Range    Color, UA Light Yellow     Clarity, UA Clear     Specific Gravity, UA 1.010 1.003 - 1.030    pH, UA 5.5 4.5, 5.0, 5.5, 6.0, 6.5, 7.0, 7.5, 8.0    Leukocytes, UA Negative Negative    Nitrite, UA Negative Negative    Protein, UA Trace (A) Negative mg/dl    Glucose, UA Negative Negative mg/dl    Ketones, UA Negative Negative mg/dl    Urobilinogen, UA <2.0 <2.0 mg/dl mg/dl    Bilirubin, UA Negative Negative    Occult Blood, UA Negative Negative   Urine Microscopic    Collection Time: 10/06/24  3:54 PM   Result Value Ref Range    RBC, UA 1-2 None Seen, 1-2 /hpf    WBC, UA 1-2 None Seen, 1-2  /hpf    Epithelial Cells None Seen None Seen, Occasional /hpf    Bacteria, UA None Seen None Seen, Occasional /hpf    MUCUS THREADS Occasional (A) None Seen    Hyaline Casts, UA 3-5 (A) None Seen /lpf

## 2024-11-11 ENCOUNTER — OFFICE VISIT (OUTPATIENT)
Dept: SURGERY | Facility: CLINIC | Age: 79
End: 2024-11-11

## 2024-11-11 VITALS
TEMPERATURE: 97.2 F | WEIGHT: 170 LBS | HEART RATE: 60 BPM | DIASTOLIC BLOOD PRESSURE: 70 MMHG | SYSTOLIC BLOOD PRESSURE: 160 MMHG | HEIGHT: 70 IN | OXYGEN SATURATION: 96 % | BODY MASS INDEX: 24.34 KG/M2

## 2024-11-11 DIAGNOSIS — C44.729 SQUAMOUS CELL CARCINOMA OF LEFT LOWER LEG: Primary | ICD-10-CM

## 2024-11-11 PROCEDURE — 99024 POSTOP FOLLOW-UP VISIT: CPT | Performed by: SURGERY

## 2024-11-11 NOTE — LETTER
November 11, 2024     Simón Hadley MD  [address] [facility] [id number]    Patient: Jay Roach Jr.   YOB: 1945   Date of Visit: 11/11/2024       Dear Dr. Hadley:    Thank you for referring Jay Roach to me for evaluation. Below are my notes for this consultation.    If you have questions, please do not hesitate to call me. I look forward to following your patient along with you.         Sincerely,        Robert Bloch, MD        CC: No Recipients

## 2024-11-13 DIAGNOSIS — K86.81 EXOCRINE PANCREATIC INSUFFICIENCY: ICD-10-CM

## 2024-11-15 ENCOUNTER — OFFICE VISIT (OUTPATIENT)
Dept: CARDIOLOGY CLINIC | Facility: MEDICAL CENTER | Age: 79
End: 2024-11-15
Payer: MEDICARE

## 2024-11-15 VITALS
WEIGHT: 174 LBS | HEIGHT: 70 IN | DIASTOLIC BLOOD PRESSURE: 60 MMHG | OXYGEN SATURATION: 99 % | SYSTOLIC BLOOD PRESSURE: 140 MMHG | BODY MASS INDEX: 24.91 KG/M2 | HEART RATE: 65 BPM

## 2024-11-15 DIAGNOSIS — I25.10 CORONARY ARTERY DISEASE INVOLVING NATIVE CORONARY ARTERY OF NATIVE HEART WITHOUT ANGINA PECTORIS: ICD-10-CM

## 2024-11-15 DIAGNOSIS — I10 PRIMARY HYPERTENSION: ICD-10-CM

## 2024-11-15 DIAGNOSIS — Z95.1 S/P CABG (CORONARY ARTERY BYPASS GRAFT): Chronic | ICD-10-CM

## 2024-11-15 DIAGNOSIS — N18.4 BENIGN HYPERTENSION WITH CKD (CHRONIC KIDNEY DISEASE) STAGE IV (HCC): ICD-10-CM

## 2024-11-15 DIAGNOSIS — I70.1 RENAL ARTERY STENOSIS, NATIVE, BILATERAL (HCC): Primary | Chronic | ICD-10-CM

## 2024-11-15 DIAGNOSIS — I12.9 BENIGN HYPERTENSION WITH CKD (CHRONIC KIDNEY DISEASE) STAGE IV (HCC): ICD-10-CM

## 2024-11-15 DIAGNOSIS — N18.4 CKD (CHRONIC KIDNEY DISEASE) STAGE 4, GFR 15-29 ML/MIN (HCC): ICD-10-CM

## 2024-11-15 DIAGNOSIS — E78.2 MIXED HYPERLIPIDEMIA: ICD-10-CM

## 2024-11-15 DIAGNOSIS — I70.213 ATHEROSCLEROSIS OF NATIVE ARTERIES OF EXTREMITIES WITH INTERMITTENT CLAUDICATION, BILATERAL LEGS (HCC): Chronic | ICD-10-CM

## 2024-11-15 DIAGNOSIS — K25.4 GASTROINTESTINAL HEMORRHAGE ASSOCIATED WITH GASTRIC ULCER: ICD-10-CM

## 2024-11-15 DIAGNOSIS — Z95.2 S/P TAVR (TRANSCATHETER AORTIC VALVE REPLACEMENT): ICD-10-CM

## 2024-11-15 DIAGNOSIS — R00.1 SINUS BRADYCARDIA: ICD-10-CM

## 2024-11-15 DIAGNOSIS — I65.23 ASYMPTOMATIC BILATERAL CAROTID ARTERY STENOSIS: Chronic | ICD-10-CM

## 2024-11-15 PROCEDURE — 99214 OFFICE O/P EST MOD 30 MIN: CPT | Performed by: INTERNAL MEDICINE

## 2024-11-15 NOTE — PROGRESS NOTES
Cardiology   Jay Roach Jr. 79 y.o. male MRN: [id number]        Impression:  1. Coronary artery disease s/p CABG - s/p recent PCI of RCA  2. Hypertension - borderline control.  3. Severe bilateral carotid disease - followed by vascular surgery.  4. Dyslipidemia - doing well.  5. Peripheral arterial disease - s/p LLE revascularization and RLE revascularization 3/23.  6. Aortic stenosis - s/p TAVR 6/22  7. Bradycardia - on low-dose b-blockers.   8. CKD - CKD IV  9.  GI bleeding - s/p EGD and clipping.      Recommendations:  1. Continue current medications.   2. Follow up in 6 months.         HPI: Jay Roach Jr. is a 79 y.o. year old male with coronary artery disease s/p CABG with PCI of RCA 5/22, AS s.o TAVR 6/22, HTN, Dyslipidemia and LLE revascularization who returns for follow up. No chest pain, shortness of breath, or palpitations. Stress test 8/22 - no ischemia.  Echo 7/20 - normal cardiac function with normal TAVR. Does have headache.  Underwent RLE revascularization 3/23. Echo 6/23 - EF 65%, normal TAVR. Had GI bleeding in 12/23 and 1/24.  Continue current medications.       Review of Systems   Constitutional: Negative.    HENT: Negative.     Eyes: Negative.    Respiratory:  Negative for chest tightness and shortness of breath.    Cardiovascular:  Negative for chest pain, palpitations and leg swelling.   Gastrointestinal: Negative.    Endocrine: Negative.    Genitourinary: Negative.    Musculoskeletal: Negative.    Skin: Negative.    Allergic/Immunologic: Negative.    Neurological: Negative.    Hematological: Negative.    Psychiatric/Behavioral: Negative.     All other systems reviewed and are negative.        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 (HCC) 02/24/2023    Basal cell carcinoma     right cheek     CAD (coronary artery disease)     Carotid stenosis, asymptomatic, bilateral     Chronic kidney disease     Colon polyp     Dependence on respirator (ventilator) status ([facility]) 04/07/2023    Diabetes ([facility])     type 2, non-insulin dependent    Diabetes mellitus ([facility])     GERD (gastroesophageal reflux disease)     History of nephrolithiasis     Hyperlipidemia     Hypertension     Left foot pain 11/30/2022    Lung cancer ([facility])     PAD (peripheral artery disease) ([facility])     Severe aortic stenosis     Squamous cell skin cancer 11/22/2022    left superior helix     Past Surgical History:   Procedure Laterality Date    APPENDECTOMY      CARDIAC CATHETERIZATION N/A 05/05/2022    Procedure: CARDIAC RHC/LHC;  Surgeon: Arvin Sanchez DO;  Location: [facility];  Service: Cardiology    CARDIAC CATHETERIZATION N/A 05/05/2022    Procedure: Cardiac Coronary Angiogram;  Surgeon: Arvin Sanchez DO;  Location: [facility];  Service: Cardiology    CARDIAC CATHETERIZATION N/A 05/24/2022    Procedure: Cardiac pci;  Surgeon: Damien Jeter MD;  Location: [facility];  Service: Cardiology    CARDIAC CATHETERIZATION N/A 06/14/2022    Procedure: CARDIAC TAVR;  Surgeon: Nahum Vaz MD;  Location: [facility];  Service: Cardiology    COLECTOMY      COLONOSCOPY  2013    CORONARY ARTERY BYPASS GRAFT  2013    X 2    FEMORAL ARTERY - POPLITEAL ARTERY BYPASS GRAFT      HEMORRHOID SURGERY      IR AORTAGRAM WITH RUN-OFF  11/19/2018    IR AORTAGRAM WITH RUN-OFF  03/02/2023    IR BIOPSY LUNG  4/17/2024    IR LOWER EXTREMITY ANGIOGRAM  03/23/2023    MOHS SURGERY Left 01/11/2023    SCC left superior helix-Dr Tovar    DC SLCTV CATHJ 3RD+ ORD SLCTV ABDL PEL/LXTR BRNCH Left 08/12/2016    Procedure: LEFT FEMORAL ARTERIOGRAM; BALLOON ANGIOPLASTY; SFA  AND FEMORAL AT VEIN GRAFT;  Surgeon: Nicholas Urena MD;  Location: [facility];  Service: Vascular    DC TEAEC W/WO PATCH GRAFT COMMON FEMORAL Right 03/23/2023     Procedure: Common femoral endarterectomy&antegrade intervention of SFA, popliteal artery w/ shockwave;  Surgeon: Eagle Higuera MD;  Location: [facility];  Service: Vascular    DC TRANSCATHETER TRANSAPICAL REPLACEMT AORTIC VALVE N/A 06/14/2022    Procedure: REPLACEMENT AORTIC VALVE TRANSCATHETER (TAVR) TRANSAPICAL 29MM IRVIN KYLER S3 ULTRA VALVE(ACCESS ON LEFT) ELANA;  Surgeon: Amarjit Gordon DO;  Location: [facility];  Service: Cardiac Surgery    SKIN CANCER EXCISION      TONSILLECTOMY AND ADENOIDECTOMY      UPPER GASTROINTESTINAL ENDOSCOPY       Social History     Substance and Sexual Activity   Alcohol Use Not Currently    Comment: rare     Social History     Substance and Sexual Activity   Drug Use No     Social History     Tobacco Use   Smoking Status Every Day    Current packs/day: 1.00    Average packs/day: 0.6 packs/day for 100.0 years (62.5 ttl pk-yrs)    Types: Cigarettes    Passive exposure: Current   Smokeless Tobacco Never   Tobacco Comments    4 cigarettes per day     Family History   Problem Relation Age of Onset    Heart attack Father     Other Sister         bypass and vlave replacement    Stroke Paternal Uncle     Arrhythmia Neg Hx     Asthma Neg Hx     Clotting disorder Neg Hx     Fainting Neg Hx     Anuerysm Neg Hx     Hypertension Neg Hx         unsure     Hyperlipidemia Neg Hx     Heart failure Neg Hx        Allergies:  No Known Allergies    Medications:     Current Outpatient Medications:     allopurinol (ZYLOPRIM) 300 mg tablet, TAKE 1 TABLET DAILY, Disp: 90 tablet, Rfl: 1    amLODIPine (NORVASC) 10 mg tablet, TAKE 1 TABLET DAILY, Disp: 90 tablet, Rfl: 3    atorvastatin (LIPITOR) 80 mg tablet, TAKE 1 TABLET DAILY AT     BEDTIME, Disp: 90 tablet, Rfl: 3    calcitriol (ROCALTROL) 0.25 mcg capsule, Take 1 capsule (0.25 mcg total) by mouth daily, Disp: 90 capsule, Rfl: 4    clopidogrel (PLAVIX) 75 mg tablet, Take 1 tablet (75 mg total) by mouth daily, Disp: 90 tablet, Rfl: 0    ferrous  sulfate 325 (65 Fe) mg tablet, Take 325 mg by mouth every other day, Disp: , Rfl:     glimepiride (AMARYL) 1 mg tablet, Take 1 tablet (1 mg total) by mouth daily with breakfast, Disp: 90 tablet, Rfl: 1    hydrALAZINE (APRESOLINE) 25 mg tablet, Take 1 tablet (25 mg total) by mouth 2 (two) times a day, Disp: 180 tablet, Rfl: 3    lisinopril (ZESTRIL) 5 mg tablet, Take 1 tablet (5 mg total) by mouth daily, Disp: 90 tablet, Rfl: 3    metoprolol succinate (TOPROL-XL) 25 mg 24 hr tablet, Take 0.5 tablets (12.5 mg total) by mouth daily, Disp: 90 tablet, Rfl: 1    pancrelipase, Lip-Prot-Amyl, (CREON) 24,000 units, Take 24,000 units of lipase by mouth 3 (three) times a day with meals, Disp: 360 capsule, Rfl: 1    pantoprazole (PROTONIX) 40 mg tablet, Take 1 tablet (40 mg total) by mouth 2 (two) times a day, Disp: 180 tablet, Rfl: 1    torsemide (DEMADEX) 20 mg tablet, TAKE 0.5 TABLETS (10 MG TOTAL) BY MOUTH DAILY TAKES 10 MG ONCE A DAY., Disp: 90 tablet, Rfl: 3    bacitracin topical ointment 500 units/g topical ointment, Apply 1 large application topically 2 (two) times a day for 7 days (Patient not taking: Reported on 11/11/2024), Disp: 28 g, Rfl: 0    Empagliflozin (Jardiance) 10 MG TABS tablet, Take 1 tablet (10 mg total) by mouth every morning (Patient not taking: Reported on 11/11/2024), Disp: 90 tablet, Rfl: 3      Wt Readings from Last 3 Encounters:   11/15/24 78.9 kg (174 lb)   11/11/24 77.1 kg (170 lb)   11/06/24 77.6 kg (171 lb)     Temp Readings from Last 3 Encounters:   11/11/24 (!) 97.2 °F (36.2 °C) (Tympanic)   10/24/24 97.6 °F (36.4 °C) (Temporal)   10/10/24 (!) 96.9 °F (36.1 °C) (Tympanic)     BP Readings from Last 3 Encounters:   11/15/24 140/60   11/11/24 160/70   11/06/24 136/58     Pulse Readings from Last 3 Encounters:   11/15/24 65   11/11/24 60   11/06/24 (!) 52         Physical Exam  HENT:      Head: Atraumatic.      Mouth/Throat:      Mouth: Mucous membranes are moist.   Eyes:      Extraocular  "Movements: Extraocular movements intact.   Cardiovascular:      Rate and Rhythm: Normal rate and regular rhythm.      Heart sounds: Normal heart sounds.   Pulmonary:      Effort: Pulmonary effort is normal.      Breath sounds: Normal breath sounds.   Abdominal:      General: Abdomen is flat.   Musculoskeletal:         General: Normal range of motion.      Cervical back: Normal range of motion.   Skin:     General: Skin is warm.   Neurological:      General: No focal deficit present.      Mental Status: He is alert and oriented to person, place, and time.   Psychiatric:         Mood and Affect: Mood normal.         Behavior: Behavior normal.           Laboratory Studies:  CMP:  Lab Results   Component Value Date     2015    K 4.2 10/06/2024     10/06/2024    CO2 24 10/06/2024    ANIONGAP 7 2015    BUN 39 (H) 10/06/2024    CREATININE 2.22 (H) 10/06/2024    GLUCOSE 163 (H) 2023    AST 13 10/06/2024    ALT 7 10/06/2024    EGFR 26 10/06/2024       Lipid Profile:   No results found for: \"CHOL\"  Lab Results   Component Value Date    HDL 52 2022     Lab Results   Component Value Date    LDLCALC 33 2022     Lab Results   Component Value Date    TRIG 50 2022       Cardiac testing:   EKG reviewed personally:   Results for orders placed during the hospital encounter of 21    Echo complete with contrast if indicated    [address] [address] [phone]    Transthoracic Echocardiogram  2D, M-mode, Doppler, and Color Doppler    Study date:  06-May-2021    Patient: TANNA BROWNLEE  MR number: [id number]  Account number: [id number]  : 1945  Age: 76 years  Gender: Male  Status: Outpatient  Location: [facility]  Height: 70 in  Weight: 181 lb  BP: 150/ 58 mmHg    Indications: Assess the aortic valve.    Diagnoses: I35.0 - Nonrheumatic aortic (valve) stenosis    Sonographer:  Cyn Chapa RDCS  Primary " Physician:  Simón Hadley MD  Referring Physician:  Israel Sanchez MD  Group:  [facility]  Interpreting Physician:  Brayden Ortiz MD    SUMMARY    LEFT VENTRICLE:  Systolic function was normal. Ejection fraction was estimated to be 60 %.  There were no regional wall motion abnormalities.  Wall thickness was mildly increased.  Doppler parameters were consistent with abnormal left ventricular relaxation (grade 1 diastolic dysfunction).    LEFT ATRIUM:  The atrium was mildly dilated.    MITRAL VALVE:  There was mild regurgitation.    AORTIC VALVE:  The valve was probably trileaflet. Leaflets exhibited moderately increased thickness and moderate calcification.  There was severe stenosis.  There was trace regurgitation.  Valve peak gradient was 63 mmHg.  Valve mean gradient was 32 mmHg.  Aortic valve area was 1 cmï¾² by the continuity equation.  Estimated aortic valve area (by VTI) was 0.91 cmï¾².    TRICUSPID VALVE:  There was trace regurgitation.    HISTORY: PRIOR HISTORY: CAD, bradycardia, DM, dyslipidemia, PAD, CABG,    PROCEDURE: The study was performed in the [facility]. This was a routine study. The transthoracic approach was used. The study included complete 2D imaging, M-mode, complete spectral Doppler, and color Doppler. The heart rate was  66 bpm, at the start of the study. Images were obtained from the parasternal, apical, subcostal, and suprasternal notch acoustic windows. Image quality was adequate.    LEFT VENTRICLE: Size was normal. Systolic function was normal. Ejection fraction was estimated to be 60 %. There were no regional wall motion abnormalities. Wall thickness was mildly increased. DOPPLER: Doppler parameters were consistent  with abnormal left ventricular relaxation (grade 1 diastolic dysfunction).    RIGHT VENTRICLE: The size was normal. Systolic function was normal. Wall thickness was normal.    LEFT ATRIUM: The atrium was mildly dilated.    RIGHT ATRIUM: Size  was normal.    MITRAL VALVE: Valve structure was normal. There was normal leaflet separation. DOPPLER: The transmitral velocity was within the normal range. There was no evidence for stenosis. There was mild regurgitation.    AORTIC VALVE: The valve was probably trileaflet. Leaflets exhibited moderately increased thickness and moderate calcification. DOPPLER: There was severe stenosis. There was trace regurgitation.    TRICUSPID VALVE: The valve structure was normal. There was normal leaflet separation. DOPPLER: The transtricuspid velocity was within the normal range. There was no evidence for stenosis. There was trace regurgitation.    PULMONIC VALVE: Leaflets exhibited normal thickness, no calcification, and normal cuspal separation. DOPPLER: The transpulmonic velocity was within the normal range. There was no significant regurgitation.    PERICARDIUM: There was no pericardial effusion. The pericardium was normal in appearance.    AORTA: The root exhibited normal size.    SYSTEMIC VEINS: IVC: The inferior vena cava was normal in size.    MEASUREMENT TABLES    2D MEASUREMENTS  LVOT   (Reference normals)  Diam   23 mm   (--)    DOPPLER MEASUREMENTS  LVOT   (Reference normals)  VTI   24 cm   (--)  R-R interval   1017 ms   (--)  HR   59 bpm   (--)  Stroke vol   99.71 ml   (--)  Cardiac output   5.88 L/min   (--)  Cardiac index   2.94 L/min/mï¾²   (--)  Aortic valve   (Reference normals)  VTI   108 cm   (--)  Peak gradient   63 mmHg   (--)  Mean gradient   32 mmHg   (--)  Valve area, cont   1 cmï¾²   (--)  Obstr index, VTI   0.22    (--)  Valve area, VTI   0.91 cmï¾²   (--)  Area index, VTI   0.46 cmï¾²/mï¾²   (--)    SYSTEM MEASUREMENT TABLES    2D  %FS: 23.52 %  Ao Diam: 3.06 cm  EDV(Teich): 137.67 ml  EF(Teich): 46.6 %  ESV(Teich): 73.51 ml  IVSd: 1.26 cm  LA Area: 24.18 cm2  LA Diam: 4.19 cm  LVEDV MOD A4C: 198.32 ml  LVEF MOD A4C: 54.08 %  LVESV MOD A4C: 91.07 ml  LVIDd: 5.34 cm  LVIDs: 4.08 cm  LVLd A4C: 10.15  cm  LVLs A4C: 8.98 cm  LVOT Diam: 2.43 cm  LVPWd: 1.18 cm  RA Area: 14.06 cm2  RVIDd: 3.75 cm  SV MOD A4C: 107.24 ml  SV(Teich): 64.16 ml    CW  AV Env.Ti: 414.84 ms  AV MaxP.54 mmHg  AV VTI: 103.91 cm  AV Vmax: 3.82 m/s  AV Vmean: 2.5 m/s  AV meanP.08 mmHg    MM  TAPSE: 2.13 cm    PW  LUIS (VTI): 1.06 cm2  LUIS Vmax: 1.04 cm2  E' Sept: 0.04 m/s  E/E' Sept: 22.83  LVOT Env.Ti: 482.08 ms  LVOT VTI: 23.7 cm  LVOT Vmax: 0.86 m/s  LVOT Vmean: 0.5 m/s  LVOT maxP.94 mmHg  LVOT meanP.25 mmHg  LVSV Dopp: 110.25 ml  MV A Pop: 1.23 m/s  MV Dec [place]: 5.05 m/s2  MV DecT: 180.67 ms  MV E Pop: 0.91 m/s  MV E/A Ratio: 0.74  MV PHT: 52.39 ms  MVA By PHT: 4.2 cm2    [employer] Accredited Echocardiography Laboratory    Prepared and electronically signed by    Brayden Ortiz MD  Signed 06-May-2021 16:40:31    No results found for this or any previous visit.    No results found for this or any previous visit.    No results found for this or any previous visit.

## 2024-11-18 ENCOUNTER — HOSPITAL ENCOUNTER (OUTPATIENT)
Dept: RADIOLOGY | Facility: MEDICAL CENTER | Age: 79
Discharge: HOME | End: 2024-11-18
Payer: MEDICARE

## 2024-11-18 DIAGNOSIS — C34.32 PRIMARY NON-SMALL CELL CARCINOMA OF LOWER LOBE OF LEFT LUNG (HCC): ICD-10-CM

## 2024-11-18 PROCEDURE — 71250 CT THORAX DX C-: CPT

## 2024-11-25 ENCOUNTER — OFFICE VISIT (OUTPATIENT)
Dept: RADIATION ONCOLOGY | Facility: CLINIC | Age: 79
End: 2024-11-25
Attending: RADIOLOGY
Payer: MEDICARE

## 2024-11-25 VITALS
SYSTOLIC BLOOD PRESSURE: 140 MMHG | TEMPERATURE: 97.8 F | DIASTOLIC BLOOD PRESSURE: 62 MMHG | WEIGHT: 172 LBS | BODY MASS INDEX: 24.68 KG/M2 | RESPIRATION RATE: 16 BRPM | HEART RATE: 55 BPM | OXYGEN SATURATION: 97 %

## 2024-11-25 DIAGNOSIS — I25.10 CORONARY ARTERY DISEASE INVOLVING NATIVE HEART WITHOUT ANGINA PECTORIS, UNSPECIFIED VESSEL OR LESION TYPE: ICD-10-CM

## 2024-11-25 DIAGNOSIS — C34.32 PRIMARY NON-SMALL CELL CARCINOMA OF LOWER LOBE OF LEFT LUNG (HCC): Primary | ICD-10-CM

## 2024-11-25 PROCEDURE — G2211 COMPLEX E/M VISIT ADD ON: HCPCS | Performed by: RADIOLOGY

## 2024-11-25 PROCEDURE — 99211 OFF/OP EST MAY X REQ PHY/QHP: CPT | Performed by: RADIOLOGY

## 2024-11-25 PROCEDURE — 99213 OFFICE O/P EST LOW 20 MIN: CPT | Performed by: RADIOLOGY

## 2024-11-25 NOTE — PROGRESS NOTES
Jay Roach Jr. 1945 is a 79 y.o. 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  He recently completed definitive SBRT on 5/30/24. He presents today for follow up.      9/13/24 MRI brain w contrast  No intracranial metastatic disease.   No acute intracranial process.   Mild chronic microangiopathic changes.      10/6/24 CT A/P wo contrast  Colonic diverticulosis without diverticulitis.  Diffuse urinary bladder wall thickening. Please correlate with urinalysis.   Again seen is sequela of chronic pancreatitis. A 1.9 cm hyperdense lesion in the pancreatic head again noted, stable over several years therefore likely benign (series 301 image 49.)      11/18/24 CT chest wo contrast  Mild emphysema.  Interstitial lung disease.  New 3 mm left upper lobe nodule, likely inflammatory/infectious.  No definite recurrent or residual disease at this time on CT chest.    Follow up visit     Oncology History   Primary non-small cell carcinoma of lower lobe of left lung (HCC)   3/21/2024 Initial Diagnosis    Primary non-small cell carcinoma of lower lobe of left lung (HCC)     4/17/2024 -  Cancer Staged    Staging form: Lung, AJCC 8th Edition  - Clinical stage from 4/17/2024: Stage IA2 (cT1b, cN0, cM0) - Signed by Uzma Delgado PA-C on 4/25/2024  Histopathologic type: Squamous cell carcinoma, NOS       4/17/2024 Biopsy    IR lung biopsy    A. Left lung nodule, biopsy: Non-small cell carcinoma consistent with a squamous cell carcinoma.      5/20/2024 - 5/30/2024 Radiation    Treatments:  Course: C1_SBRT  Plan ID Energy Fractions Dose per Fraction (cGy) Dose Correction (cGy) Total Dose Delivered (cGy) Elapsed Days   SBRT LLL_50Gy 6X-FFF 5 / 5 1,000 0 5,000 10    Treatment Dates:  5/20/2024 - 5/30/2024.          Review of Systems:  Review of Systems   Respiratory:  Positive for cough (clear to yellow phlegm).  Negative for chest tightness, shortness of breath and wheezing.         COPD       Clinical Trial: no              Health Maintenance   Topic Date Due    Zoster Vaccine (1 of 2) Never done    Hepatitis A Vaccine (1 of 2 - Risk 2-dose series) Never done    COVID-19 Vaccine (7 - 2024-25 season) 11/26/2024    Diabetic Foot Exam  02/21/2025    HEMOGLOBIN A1C  03/04/2025    Fall Risk  09/04/2025    Medicare Annual Wellness Visit (AWV)  09/04/2025    BMI: Adult  11/15/2025    Depression Screening  11/25/2025    DM Eye Exam  02/01/2026    Hepatitis C Screening  Completed    RSV Vaccine Age 60+ Years  Completed    Pneumococcal Vaccine: 65+ Years  Completed    Influenza Vaccine  Completed    RSV Vaccine age 0-20 Months  Aged Out    HIB Vaccine  Aged Out    IPV Vaccine  Aged Out    Meningococcal ACWY Vaccine  Aged Out    HPV Vaccine  Aged Out    Lung Cancer Screening  Discontinued    Colorectal Cancer Screening  Discontinued     Patient Active Problem List   Diagnosis    Atherosclerosis of native arteries of extremities with intermittent claudication, bilateral legs ([facility])    Stenosis of noncoronary bypass graft ([facility])    Asymptomatic bilateral carotid artery stenosis    S/P CABG (coronary artery bypass graft)    Hypertension    Renal artery stenosis, native, bilateral ([facility])    CAD (coronary artery disease)    Progressive angina ([facility])    Tension headache    Cigarette nicotine dependence with nicotine-induced disorder    Calcific tendinitis of right shoulder region    Right shoulder pain    Sinus bradycardia    Type 2 diabetes mellitus with stage 4 chronic kidney disease, without long-term current use of insulin ([facility])    GERD (gastroesophageal reflux disease)    Hyperlipidemia    Persistent proteinuria, unspecified    CKD (chronic kidney disease) stage 4, GFR 15-29 ml/min ([facility])    Leukopenia    Hypomagnesemia    Renal cyst, left    Acute kidney injury superimposed on chronic kidney disease  (HCC)    Iron deficiency anemia     Chronic diastolic CHF (congestive heart failure) (HCC)    Elevated serum creatinine    S/P TAVR (transcatheter aortic valve replacement)    Benign hypertension with chronic kidney disease, stage III (HCC)    Secondary hyperparathyroidism of renal origin (HCC)    Anemia due to stage 4 chronic kidney disease  (HCC)    Hyperuricemia    Platelets decreased (HCC)    Vitamin D deficiency    GI bleed    Acute blood loss anemia    Acute kidney injury superimposed on stage 4 chronic kidney disease (HCC)    Melena    Chronic pancreatitis (HCC)    Peptic ulcer    History of GI bleed    Anemia    Primary non-small cell carcinoma of lower lobe of left lung (HCC)    Post-procedural respiratory complication    Benign hypertension with CKD (chronic kidney disease) stage IV (HCC)    Nephrolithiasis    Controlled type 2 diabetes mellitus with stage 4 chronic kidney disease, without long-term current use of insulin (HCC)     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 ([facility]) 09/10/2021    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 (HCC) 02/24/2023    Basal cell carcinoma     right cheek    CAD (coronary artery disease)     Carotid stenosis, asymptomatic, bilateral     Chronic kidney disease     Colon polyp     Dependence on respirator (ventilator) status (HCC) 04/07/2023    Diabetes (HCC)     type 2, non-insulin dependent    Diabetes mellitus (HCC)     GERD (gastroesophageal reflux disease)     History of nephrolithiasis     Hyperlipidemia     Hypertension     Left foot pain 11/30/2022    Lung cancer (HCC)     PAD (peripheral artery disease) ([facility])     Severe aortic stenosis     Squamous cell skin cancer 11/22/2022    left superior helix     Past Surgical History:   Procedure Laterality Date    APPENDECTOMY      CARDIAC CATHETERIZATION N/A 05/05/2022    Procedure: CARDIAC RHC/LHC;  Surgeon: Arvin Sanchez  ;  [facility];  Service: Cardiology    CARDIAC CATHETERIZATION N/A 05/05/2022    Procedure: Cardiac Coronary Angiogram;  Surgeon: Arvin Sanchez DO;  Location: [facility];  Service: Cardiology    CARDIAC CATHETERIZATION N/A 05/24/2022    Procedure: Cardiac pci;  Surgeon: Damien Jeter MD;  [facility]: [facility];  Service: Cardiology    CARDIAC CATHETERIZATION N/A 06/14/2022    Procedure: CARDIAC TAVR;  Surgeon: Nahum Vaz MD;  Location: [facility];  Service: Cardiology    COLECTOMY      COLONOSCOPY  2013    CORONARY ARTERY BYPASS GRAFT  2013    X 2    FEMORAL ARTERY - POPLITEAL ARTERY BYPASS GRAFT      HEMORRHOID SURGERY      IR AORTAGRAM WITH RUN-OFF  11/19/2018    IR AORTAGRAM WITH RUN-OFF  03/02/2023    IR BIOPSY LUNG  4/17/2024    IR LOWER EXTREMITY ANGIOGRAM  03/23/2023    MOHS SURGERY Left 01/11/2023    SCC left superior helix-Dr Guy    GA SLCTV CATHJ 3RD+ ORD SLCTV ABDL PEL/LXTR BRNCH Left 08/12/2016    Procedure: LEFT FEMORAL ARTERIOGRAM; BALLOON ANGIOPLASTY; SFA  AND FEMORAL AT VEIN GRAFT;  Surgeon: Nicholas Urena MD;  Location: [facility];  Service: Vascular    GA TEAEC W/WO PATCH GRAFT COMMON FEMORAL Right 03/23/2023    Procedure: Common femoral endarterectomy&antegrade intervention of SFA, popliteal artery w/ shockwave;  Surgeon: Eagle Higuera MD;  Location: [facility];  Service: Vascular    GA TRANSCATHETER TRANSAPICAL REPLACEMT AORTIC VALVE N/A 06/14/2022    Procedure: REPLACEMENT AORTIC VALVE TRANSCATHETER (TAVR) TRANSAPICAL 29MM IRVIN KYLER S3 ULTRA VALVE(ACCESS ON LEFT) ELANA;  Surgeon: Amarjit Gordon DO;  Location: [facility];  Service: Cardiac Surgery    SKIN CANCER EXCISION      TONSILLECTOMY AND ADENOIDECTOMY      UPPER GASTROINTESTINAL ENDOSCOPY       Family History   Problem Relation Age of Onset    Heart attack Father     Other Sister         bypass and vlave replacement    Stroke Paternal Uncle     Arrhythmia Neg Hx     Asthma  Neg Hx     Clotting disorder Neg Hx     Fainting Neg Hx     Anuerysm Neg Hx     Hypertension Neg Hx         unsure     Hyperlipidemia Neg Hx     Heart failure Neg Hx      Social History     Socioeconomic History    Marital status:      Spouse name: Not on file    Number of children: Not on file    Years of education: Not on file    Highest education level: Not on file   Occupational History    Not on file   Tobacco Use    Smoking status: Every Day     Current packs/day: 1.00     Average packs/day: 0.6 packs/day for 100.0 years (62.5 ttl pk-yrs)     Types: Cigarettes     Passive exposure: Current    Smokeless tobacco: Never    Tobacco comments:     4 cigarettes per day   Vaping Use    Vaping status: Never Used   Substance and Sexual Activity    Alcohol use: Not Currently     Comment: rare    Drug use: No    Sexual activity: Not Currently   Other Topics Concern    Not on file   Social History Narrative    · Most recent tobacco use screenin2018      · Do you currently or have you served in the [employer]:   Yes      · If Yes, What branch of service:   [employer]      · Occupation:   Dentists      · Exercise level:   Occasional        · Caffeine intake:   Heavy      · Marital status:         · Diet:   Regular  low fat     · Seat belts used routinely:   Yes      · Smoke alarm in home:   Yes      · Advance directive:   Yes      · General stress level:   Low      Social Drivers of Health     Financial Resource Strain: Low Risk  (2023)    Overall Financial Resource Strain (CARDIA)     Difficulty of Paying Living Expenses: Not very hard   Food Insecurity: No Food Insecurity (2024)    Nursing - Inadequate Food Risk Classification     Worried About Running Out of Food in the Last Year: Never true     Ran Out of Food in the Last Year: Never true     Ran Out of Food in the Last Year: Not on file   Transportation Needs: No Transportation Needs (2024)    PRAPARE - Transportation     Lack of  Transportation (Medical): No     Lack of Transportation (Non-Medical): No   Physical Activity: Not on file   Stress: Not on file   Social Connections: Not on file   Intimate Partner Violence: Not on file   Housing Stability: Low Risk  (9/4/2024)    Housing Stability Vital Sign     Unable to Pay for Housing in the Last Year: No     Number of Times Moved in the Last Year: 1     Homeless in the Last Year: No       Current Outpatient Medications:     allopurinol (ZYLOPRIM) 300 mg tablet, TAKE 1 TABLET DAILY, Disp: 90 tablet, Rfl: 1    amLODIPine (NORVASC) 10 mg tablet, TAKE 1 TABLET DAILY, Disp: 90 tablet, Rfl: 3    atorvastatin (LIPITOR) 80 mg tablet, TAKE 1 TABLET DAILY AT     BEDTIME, Disp: 90 tablet, Rfl: 3    calcitriol (ROCALTROL) 0.25 mcg capsule, Take 1 capsule (0.25 mcg total) by mouth daily, Disp: 90 capsule, Rfl: 4    clopidogrel (PLAVIX) 75 mg tablet, Take 1 tablet (75 mg total) by mouth daily, Disp: 90 tablet, Rfl: 0    ferrous sulfate 325 (65 Fe) mg tablet, Take 325 mg by mouth every other day, Disp: , Rfl:     glimepiride (AMARYL) 1 mg tablet, Take 1 tablet (1 mg total) by mouth daily with breakfast, Disp: 90 tablet, Rfl: 1    hydrALAZINE (APRESOLINE) 25 mg tablet, Take 1 tablet (25 mg total) by mouth 2 (two) times a day, Disp: 180 tablet, Rfl: 3    lisinopril (ZESTRIL) 5 mg tablet, Take 1 tablet (5 mg total) by mouth daily, Disp: 90 tablet, Rfl: 3    metoprolol succinate (TOPROL-XL) 25 mg 24 hr tablet, Take 0.5 tablets (12.5 mg total) by mouth daily, Disp: 90 tablet, Rfl: 1    pancrelipase, Lip-Prot-Amyl, (CREON) 24,000 units, Take 24,000 units of lipase by mouth 3 (three) times a day with meals, Disp: 360 capsule, Rfl: 1    pantoprazole (PROTONIX) 40 mg tablet, Take 1 tablet (40 mg total) by mouth 2 (two) times a day, Disp: 180 tablet, Rfl: 1    torsemide (DEMADEX) 20 mg tablet, TAKE 0.5 TABLETS (10 MG TOTAL) BY MOUTH DAILY TAKES 10 MG ONCE A DAY., Disp: 90 tablet, Rfl: 3    bacitracin topical ointment  500 units/g topical ointment, Apply 1 large application topically 2 (two) times a day for 7 days (Patient not taking: Reported on 11/11/2024), Disp: 28 g, Rfl: 0    Empagliflozin (Jardiance) 10 MG TABS tablet, Take 1 tablet (10 mg total) by mouth every morning (Patient not taking: Reported on 11/11/2024), Disp: 90 tablet, Rfl: 3  No Known Allergies  Vitals:    11/25/24 1435   BP: 140/62   Pulse: 55   Resp: 16   Temp: 97.8 °F (36.6 °C)   SpO2: 97%   Weight: 78 kg (172 lb)      Pain Score: 0-No pain

## 2024-11-27 RX ORDER — CLOPIDOGREL BISULFATE 75 MG/1
75 TABLET ORAL DAILY
Qty: 90 TABLET | Refills: 3 | Status: SHIPPED | OUTPATIENT
Start: 2024-11-27

## 2024-11-29 ENCOUNTER — APPOINTMENT (OUTPATIENT)
Dept: RADIATION ONCOLOGY | Facility: CLINIC | Age: 79
End: 2024-11-29
Payer: MEDICARE

## 2024-12-01 NOTE — PROGRESS NOTES
Follow-up - Radiation Oncology   Jay Roach Jr. 1945 79 y.o. 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 is a 79 y.o. man wiith a history of with multiple comorbidities and nonsurgical candidate diagnosed with stage I squamous cell carcinoma of the left lung in April 2024.  He recently completed definitive SBRT on 5/30/24. He presents today for follow up.     9/13/24 MRI brain w contrast  No intracranial metastatic disease.   No acute intracranial process.   Mild chronic microangiopathic changes.     10/6/24 CT A/P wo contrast  Colonic diverticulosis without diverticulitis.  Diffuse urinary bladder wall thickening. Please correlate with urinalysis.   Again seen is sequela of chronic pancreatitis. A 1.9 cm hyperdense lesion in the pancreatic head again noted, stable over several years therefore likely benign (series 301 image 49.)     11/18/24 CT chest wo contrast  Mild emphysema.  Interstitial lung disease.  New 3 mm left upper lobe nodule, likely inflammatory/infectious.  No definite recurrent or residual disease at this time on CT chest.     The patient denies new respiratory symptoms . He denies significant cough, progressive dyspnea on exertion or shortness of breath at rest.  He denies substernal chest pain or palpitation.    He complains of several weeks to months of bilateral nipple tenderness.  No skin changes, nodules, or discharge bilaterally.    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 Left lung nodule, biopsy: Non-small cell carcinoma consistent with a squamous cell carcinoma.      5/20/2024 - 5/30/2024 Radiation    Treatments:  Course: C1_SBRT  Plan ID Energy Fractions Dose per Fraction (cGy) Dose Correction (cGy) Total Dose Delivered (cGy) Elapsed Days   SBRT LLL_50Gy 6X-FFF 5 / 5 1,000 0 5,000 10    Treatment Dates:  5/20/2024 - 5/30/2024.          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 (HCC) 02/24/2023    Basal cell carcinoma     right cheek    CAD (coronary artery disease)     Carotid stenosis, asymptomatic, bilateral     Chronic kidney disease     Colon polyp     Dependence on respirator (ventilator) status (HCC) 04/07/2023    Diabetes ([facility])     type 2, non-insulin dependent    Diabetes mellitus (HCC)     GERD (gastroesophageal reflux disease)     History of nephrolithiasis     Hyperlipidemia     Hypertension     Left foot pain 11/30/2022    Lung cancer (HCC)     PAD (peripheral artery disease) (HCC)     Severe aortic stenosis     Squamous cell skin cancer 11/22/2022    left superior helix     Past Surgical History:   Procedure Laterality Date    APPENDECTOMY      CARDIAC CATHETERIZATION N/A 05/05/2022    Procedure: CARDIAC RHC/LHC;  Surgeon: Arvin Sanchez DO;  [facility]: [facility];  Service: Cardiology    CARDIAC CATHETERIZATION N/A 05/05/2022    Procedure: Cardiac Coronary Angiogram;  Surgeon: Arvin Sanchez DO;  [facility];  Service: Cardiology    CARDIAC CATHETERIZATION N/A 05/24/2022    Procedure: Cardiac pci;   Surgeon: Damien Jeter MD;  Location[facility];  Service: Cardiology    CARDIAC CATHETERIZATION N/A 06/14/2022    Procedure: CARDIAC TAVR;  Surgeon: Nahum Vaz MD;  Location: [facility];  Service: Cardiology    COLECTOMY      COLONOSCOPY  2013    CORONARY ARTERY BYPASS GRAFT  2013    X 2    FEMORAL ARTERY - POPLITEAL ARTERY BYPASS GRAFT      HEMORRHOID SURGERY      IR AORTAGRAM WITH RUN-OFF  11/19/2018    IR AORTAGRAM WITH RUN-OFF  03/02/2023    IR BIOPSY LUNG  4/17/2024    IR LOWER EXTREMITY ANGIOGRAM  03/23/2023    MOHS SURGERY Left 01/11/2023    SCC left superior helix-Dr Guy    PA SLCTV CATHJ 3RD+ ORD SLCTV ABDL PEL/LXTR BRNCH Left 08/12/2016    Procedure: LEFT FEMORAL ARTERIOGRAM; BALLOON ANGIOPLASTY; SFA  AND FEMORAL AT VEIN GRAFT;  Surgeon: Nicholas Urena MD;  Location: [facility];  Service: Vascular    PA TEAEC W/WO PATCH GRAFT COMMON FEMORAL Right 03/23/2023    Procedure: Common femoral endarterectomy&antegrade intervention of SFA, popliteal artery w/ shockwave;  Surgeon: Eagle Higuera MD;  Location: [facility];  Service: Vascular    PA TRANSCATHETER TRANSAPICAL REPLACEMT AORTIC VALVE N/A 06/14/2022    Procedure: REPLACEMENT AORTIC VALVE TRANSCATHETER (TAVR) TRANSAPICAL 29MM IRVIN KYLER S3 ULTRA VALVE(ACCESS ON LEFT) ELANA;  Surgeon: Amrajit Gordon DO;  Location: [facility];  Service: Cardiac Surgery    SKIN CANCER EXCISION      TONSILLECTOMY AND ADENOIDECTOMY      UPPER GASTROINTESTINAL ENDOSCOPY         Social History   Social History     Substance and Sexual Activity   Alcohol Use Not Currently    Comment: rare     Social History     Substance and Sexual Activity   Drug Use No     Social History     Tobacco Use   Smoking Status Every Day    Current packs/day: 1.00    Average packs/day: 0.6 packs/day for 100.0 years (62.5 ttl pk-yrs)    Types: Cigarettes    Passive exposure: Current   Smokeless Tobacco Never   Tobacco Comments    4 cigarettes per day         Meds/Allergies      Current Outpatient Medications:     allopurinol (ZYLOPRIM) 300 mg tablet, TAKE 1 TABLET DAILY, Disp: 90 tablet, Rfl: 1    amLODIPine (NORVASC) 10 mg tablet, TAKE 1 TABLET DAILY, Disp: 90 tablet, Rfl: 3    atorvastatin (LIPITOR) 80 mg tablet, TAKE 1 TABLET DAILY AT     BEDTIME, Disp: 90 tablet, Rfl: 3    calcitriol (ROCALTROL) 0.25 mcg capsule, Take 1 capsule (0.25 mcg total) by mouth daily, Disp: 90 capsule, Rfl: 4    ferrous sulfate 325 (65 Fe) mg tablet, Take 325 mg by mouth every other day, Disp: , Rfl:     glimepiride (AMARYL) 1 mg tablet, Take 1 tablet (1 mg total) by mouth daily with breakfast, Disp: 90 tablet, Rfl: 1    hydrALAZINE (APRESOLINE) 25 mg tablet, Take 1 tablet (25 mg total) by mouth 2 (two) times a day, Disp: 180 tablet, Rfl: 3    lisinopril (ZESTRIL) 5 mg tablet, Take 1 tablet (5 mg total) by mouth daily, Disp: 90 tablet, Rfl: 3    metoprolol succinate (TOPROL-XL) 25 mg 24 hr tablet, Take 0.5 tablets (12.5 mg total) by mouth daily, Disp: 90 tablet, Rfl: 1    pancrelipase, Lip-Prot-Amyl, (CREON) 24,000 units, Take 24,000 units of lipase by mouth 3 (three) times a day with meals, Disp: 360 capsule, Rfl: 1    pantoprazole (PROTONIX) 40 mg tablet, Take 1 tablet (40 mg total) by mouth 2 (two) times a day, Disp: 180 tablet, Rfl: 1    torsemide (DEMADEX) 20 mg tablet, TAKE 0.5 TABLETS (10 MG TOTAL) BY MOUTH DAILY TAKES 10 MG ONCE A DAY., Disp: 90 tablet, Rfl: 3    bacitracin topical ointment 500 units/g topical ointment, Apply 1 large application topically 2 (two) times a day for 7 days (Patient not taking: Reported on 11/11/2024), Disp: 28 g, Rfl: 0    clopidogrel (PLAVIX) 75 mg tablet, TAKE 1 TABLET DAILY, Disp: 90 tablet, Rfl: 3    Empagliflozin (Jardiance) 10 MG TABS tablet, Take 1 tablet (10 mg total) by mouth every morning (Patient not taking: Reported on 11/11/2024), Disp: 90 tablet, Rfl: 3  No Known Allergies      Review of Systems   Respiratory:  Positive for cough (clear to yellow phlegm).  Negative for chest tightness, shortness of breath and wheezing.         COPD          OBJECTIVE:   /62   Pulse 55   Temp 97.8 °F (36.6 °C)   Resp 16   Wt 78 kg (172 lb)   SpO2 97%   BMI 24.68 kg/m²   Karnofsky: 80 - Normal activity with effort; some signs or symptoms of disease    Physical Exam  Vitals and nursing note reviewed.   Constitutional:       General: He is not in acute distress.     Appearance: He is well-developed.   Cardiovascular:      Rate and Rhythm: Normal rate and regular rhythm.   Pulmonary:      Breath sounds: No wheezing, rhonchi or rales.      Comments: Decreased breath sounds bilaterally  Chest:      Comments: Breast exam demonstrates small gynecomastia bilaterally.  There are no suspicious skin changes or palpable nodules appreciated bilaterally.  Mild tenderness to deep palpation.  No erythema, discharge, or edema or warmth.  Abdominal:      Palpations: Abdomen is soft.      Tenderness: There is no abdominal tenderness.   Lymphadenopathy:      Cervical: No cervical adenopathy.      Upper Body:      Right upper body: No supraclavicular or axillary adenopathy.      Left upper body: No supraclavicular or axillary adenopathy.   Neurological:      Mental Status: He is alert and oriented to person, place, and time.          RESULTS  Imaging Studies:CT chest wo contrast  Result Date: 11/22/2024  Narrative: CT CHEST WITHOUT IV CONTRAST INDICATION: C34.32: Malignant neoplasm of lower lobe, left bronchus or lung. COMPARISON: CT dated 7/29/2024 TECHNIQUE: CT examination of the chest was performed without intravenous contrast. Multiplanar 2D reformatted images were created from the source data. This examination, like all CT scans performed in the [facility], was performed utilizing techniques to minimize radiation dose exposure, including the use of iterative reconstruction and automated exposure control. Radiation dose length product (DLP) for this visit: 297 mGy-cm  FINDINGS: LUNGS: Patent central airways. Prominent interstitial marking and subpleural cystic changes and groundglass attenuation with lower lobe predominant, compatible with interstitial lung disease. Mild emphysema. Lung nodules annotated on series 3: 3 mm left upper lobe nodule on image #64, new. Subsegmental scarring in the left lower lobe. PLEURA: Unremarkable. HEART/GREAT VESSELS: Status post TAVR and CABG. No thoracic aortic aneurysm. MEDIASTINUM AND MICHAEL: Unremarkable. CHEST WALL AND LOWER NECK: Bilateral gynecomastia. VISUALIZED STRUCTURES IN THE UPPER ABDOMEN: Extensive atherosclerotic calcifications. OSSEOUS STRUCTURES: Stable sclerotic lesions at C7 and T2.     Impression: Mild emphysema. Interstitial lung disease. New 3 mm left upper lobe nodule, likely inflammatory/infectious. No definite recurrent or residual disease at this time on CT chest. Workstation performed: [id number]         Assessment/Plan:  Orders Placed This Encounter   Procedures    CT chest wo contrast        Jay Roach JrAgustín is a 79 y.o. year old male with a history of with multiple comorbidities and nonsurgical candidate diagnosed with stage I squamous cell carcinoma of the left lung in April 2024.  He recently completed definitive SBRT on 5/30/24.      Lung cancer currently clinically INEZ  -I reviewed imaging and results with the patient. Likely inflammatory new 3mm left upper lobe lung lesion noted.  No definite recurrent or residual disease.    -Repeat chest CT in 3-4 months with follow-up afterwards     Tenderness of the breasts, small gynecomastia  -Clinical exam is negative.  Unclear etiology of tenderness.  No gross abnormality on CT, but explained that this is not reliable way of detecting breast cancer and offered bilateral mammogram for evaluation.  He deferred.  -Refer back to PCP for follow-up evaluation.  If symptoms progress, then recommended he contact PCP or us for imaging work-up to begin.  He agreed.    Lilia  "MD Javier  12/1/2024,2:48 PM    Portions of the record may have been created with voice recognition software.  Occasional wrong word or \"sound a like\" substitutions may have occurred due to the inherent limitations of voice recognition software.  Read the chart carefully and recognize, using context, where substitutions have occurred.        "

## 2024-12-02 DIAGNOSIS — E78.5 HYPERLIPIDEMIA, UNSPECIFIED HYPERLIPIDEMIA TYPE: ICD-10-CM

## 2024-12-02 RX ORDER — ATORVASTATIN CALCIUM 80 MG/1
80 TABLET, FILM COATED ORAL
Qty: 30 TABLET | Refills: 0 | Status: SHIPPED | OUTPATIENT
Start: 2024-12-02

## 2024-12-02 NOTE — TELEPHONE ENCOUNTER
Reason for call:   [x] Refill   [] Prior Auth  [] Other:     Office:   [] PCP/Provider -   [x] Specialty/Provider -  Cardio    Medication: Atorvastatin    Dose/Frequency: 80 mg    Quantity: 90 tablet    Pharmacy: [facility]/pharmacy #[id number] - [place] - [address].     Does the patient have enough for 3 days?   [] Yes   [x] No - Send as HP to POD

## 2024-12-10 ENCOUNTER — RA CDI HCC (OUTPATIENT)
Dept: OTHER | Facility: HOSPITAL | Age: 79
End: 2024-12-10

## 2024-12-10 DIAGNOSIS — K86.81 EXOCRINE PANCREATIC INSUFFICIENCY: ICD-10-CM

## 2024-12-10 RX ORDER — PANCRELIPASE 24000; 76000; 120000 [USP'U]/1; [USP'U]/1; [USP'U]/1
CAPSULE, DELAYED RELEASE PELLETS ORAL
Qty: 300 CAPSULE | Refills: 1 | Status: SHIPPED | OUTPATIENT
Start: 2024-12-10

## 2024-12-18 ENCOUNTER — OFFICE VISIT (OUTPATIENT)
Dept: FAMILY MEDICINE CLINIC | Facility: CLINIC | Age: 79
End: 2024-12-18
Payer: MEDICARE

## 2024-12-18 VITALS
DIASTOLIC BLOOD PRESSURE: 50 MMHG | BODY MASS INDEX: 25.05 KG/M2 | HEIGHT: 70 IN | WEIGHT: 175 LBS | TEMPERATURE: 97.2 F | HEART RATE: 55 BPM | SYSTOLIC BLOOD PRESSURE: 120 MMHG | OXYGEN SATURATION: 98 %

## 2024-12-18 DIAGNOSIS — C34.32 PRIMARY NON-SMALL CELL CARCINOMA OF LOWER LOBE OF LEFT LUNG (HCC): ICD-10-CM

## 2024-12-18 DIAGNOSIS — N18.4 ACUTE RENAL FAILURE SUPERIMPOSED ON STAGE 4 CHRONIC KIDNEY DISEASE, UNSPECIFIED ACUTE RENAL FAILURE TYPE (HCC): Primary | ICD-10-CM

## 2024-12-18 DIAGNOSIS — N17.9 ACUTE RENAL FAILURE SUPERIMPOSED ON STAGE 4 CHRONIC KIDNEY DISEASE, UNSPECIFIED ACUTE RENAL FAILURE TYPE (HCC): Primary | ICD-10-CM

## 2024-12-18 DIAGNOSIS — E78.2 MIXED HYPERLIPIDEMIA: ICD-10-CM

## 2024-12-18 DIAGNOSIS — N18.4 CONTROLLED TYPE 2 DIABETES MELLITUS WITH STAGE 4 CHRONIC KIDNEY DISEASE, WITHOUT LONG-TERM CURRENT USE OF INSULIN (HCC): ICD-10-CM

## 2024-12-18 DIAGNOSIS — R10.30 LOWER ABDOMINAL PAIN: ICD-10-CM

## 2024-12-18 DIAGNOSIS — I25.10 CORONARY ARTERY DISEASE INVOLVING NATIVE CORONARY ARTERY OF NATIVE HEART WITHOUT ANGINA PECTORIS: ICD-10-CM

## 2024-12-18 DIAGNOSIS — E78.5 HYPERLIPIDEMIA, UNSPECIFIED HYPERLIPIDEMIA TYPE: ICD-10-CM

## 2024-12-18 DIAGNOSIS — E11.22 CONTROLLED TYPE 2 DIABETES MELLITUS WITH STAGE 4 CHRONIC KIDNEY DISEASE, WITHOUT LONG-TERM CURRENT USE OF INSULIN (HCC): ICD-10-CM

## 2024-12-18 DIAGNOSIS — I10 PRIMARY HYPERTENSION: ICD-10-CM

## 2024-12-18 DIAGNOSIS — K21.9 GASTROESOPHAGEAL REFLUX DISEASE WITHOUT ESOPHAGITIS: ICD-10-CM

## 2024-12-18 DIAGNOSIS — F17.219 CIGARETTE NICOTINE DEPENDENCE WITH NICOTINE-INDUCED DISORDER: ICD-10-CM

## 2024-12-18 PROCEDURE — G2211 COMPLEX E/M VISIT ADD ON: HCPCS | Performed by: FAMILY MEDICINE

## 2024-12-18 PROCEDURE — 99214 OFFICE O/P EST MOD 30 MIN: CPT | Performed by: FAMILY MEDICINE

## 2024-12-18 RX ORDER — ATORVASTATIN CALCIUM 80 MG/1
80 TABLET, FILM COATED ORAL
Qty: 90 TABLET | Refills: 1 | Status: ON HOLD | OUTPATIENT
Start: 2024-12-18

## 2024-12-18 NOTE — ASSESSMENT & PLAN NOTE
Patient is stable with current meds and discussed a low carb diet. Pt  recommended to see eye doctor and foot doctor for routine screening as per protocol. Recheck A1C  and Cr in 3 months. Patient questions answered today about this condtion.   Lab Results   Component Value Date    HGBA1C 7.3 (H) 09/04/2024

## 2024-12-18 NOTE — ASSESSMENT & PLAN NOTE
Patient  is stable with current medication and we discussed a low fat low cholesterol diet. Weight loss also discussed for this will help lower cholesterol also. Recheck lipids in 6 months.   Orders:  •  atorvastatin (LIPITOR) 80 mg tablet; Take 1 tablet (80 mg total) by mouth daily at bedtime

## 2024-12-18 NOTE — ASSESSMENT & PLAN NOTE
Lab Results   Component Value Date    EGFR 26 10/06/2024    EGFR 29 09/04/2024    EGFR 27 07/19/2024    CREATININE 2.22 (H) 10/06/2024    CREATININE 2.06 (H) 09/04/2024    CREATININE 2.17 (H) 07/19/2024   Pt to avoid NSAIDs and any IV dyes. Patient to follow up eoither with nephrology or  with us for  further  monitoring of  renal function.

## 2024-12-18 NOTE — PROGRESS NOTES
Name: Jay Roach Jr.      : 1945      MRN: [id number]  Encounter Provider: Simón Hadley MD  Encounter Date: 2024   Encounter department: [facility]  :  Assessment & Plan  Acute renal failure superimposed on stage 4 chronic kidney disease, unspecified acute renal failure type (HCC)  Lab Results   Component Value Date    EGFR 26 10/06/2024    EGFR 29 2024    EGFR 27 2024    CREATININE 2.22 (H) 10/06/2024    CREATININE 2.06 (H) 2024    CREATININE 2.17 (H) 2024   Pt to avoid NSAIDs and any IV dyes. Patient to follow up eoither with nephrology or  with us for  further  monitoring of  renal function.        Coronary artery disease involving native coronary artery of native heart without angina pectoris  Patient to continue  with current cardiac meds to decrease risk of re stenosis. Patient to follow up with cardiology for scheduled appointments to decrease risk for further cardiac problems with appropriate diagnostic testing to reassess cardiac status. Patient had alll questions about this problem answered today.        Cigarette nicotine dependence with nicotine-induced disorder  Patient encouraged to quit using tobacco for that increases their risk for COPD, lung cancer,stroke, oral cancer and heart disease. If patient does not want to quit they should let me know  when they are interested in quitting. There are numerous options to use to quit and we can discuss them.        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 Pt  recommended to see eye doctor and foot doctor for routine screening as per protocol. Recheck A1C  and Cr in 3 months. Patient questions answered today about this condtion.   Lab Results   Component Value Date    HGBA1C 7.3 (H) 2024          Gastroesophageal reflux disease  without esophagitis  Patient to continue with present therapy and decrease caffeine, avoid ETOH and smoking to decrease acid production. Pt should also cease eating prior to bedtime and avoid excessive fluid intake prior to sleep. May use antacids as needed for breakthrough GERD. All pateint questions answered today about this condition.        Primary hypertension  Patient is stable with current anti-hypertensive medicine and continue to follow a low sodium diet and take current medication. All questions about this condition were answered today.        Mixed hyperlipidemia  Patient  is stable with current medication and we discussed a low fat low cholesterol diet. Weight loss also discussed for this will help lower cholesterol also. Recheck lipids in 6 months.        Primary non-small cell carcinoma of lower lobe of left lung (HCC)  Patient is stable  and will continue present plan of care and reassess at next routine visit. All questions about this problem from patient were answered today.        Hyperlipidemia, unspecified hyperlipidemia type  Patient  is stable with current medication and we discussed a low fat low cholesterol diet. Weight loss also discussed for this will help lower cholesterol also. Recheck lipids in 6 months.   Orders:  •  atorvastatin (LIPITOR) 80 mg tablet; Take 1 tablet (80 mg total) by mouth daily at bedtime    Lower abdominal pain    Orders:  •  CBC and differential; Future  •  Lactic acid, plasma (w/reflex if result > 2.0); Future  •  Comprehensive metabolic panel; Future  •  Amylase; Future  •  Lipase; Future          Falls Plan of Care: balance, strength, and gait training instructions were provided. Home safety education provided.     History of Present Illness     This is an 79-year-old male here today for checkup on multimedical problems patient with peripheral vascular disease that is significant coronary artery disease hypertension hyperlipidemia history of type 2 diabetes who is  "here today with chief complaint of some lower abdominal pain with some constipation is out of the last couple days.  Patient has some constipation but that is got somewhat better he does have some nausea but that is also gotten somewhat better although he still does have some diffuse lower abdominal pelvic pain.  Patient with some concerns about possible diverticulitis versus possible ischemic bowel.  This patient has a long history of ischemia in his lower extremities as well as history of vascular problems with his carotids and coronaries and it would not put it past me if he is having have some problems with his superior or inferior mesenteric artery ischemia although patient does not have any problems with abdominal pain with eating.  With his history you cannot rule out any kind of bowel ischemia.  Patient also may have some diverticulitis although at this time he said he is doing somewhat better after having a bowel movement.  Patient with no blood in his stool but he does history of GI bleed in the past and has been having some PPI usage at this time.  Discussed with patient about going to the ER if he is having more abdominal discomfort talked about getting a CAT scan but he wants to hold off on that for right now but he is agreeable to getting some lab work which I will order for him today that he can get stat.      Review of Systems    Objective   /50 (BP Location: Left arm, Patient Position: Sitting, Cuff Size: Large)   Pulse 55   Temp (!) 97.2 °F (36.2 °C) (Temporal)   Ht 5' 10\" (1.778 m)   Wt 79.4 kg (175 lb)   SpO2 98%   BMI 25.11 kg/m²      Physical Exam    "

## 2024-12-19 ENCOUNTER — RESULTS FOLLOW-UP (OUTPATIENT)
Dept: FAMILY MEDICINE CLINIC | Facility: CLINIC | Age: 79
End: 2024-12-19

## 2024-12-19 ENCOUNTER — APPOINTMENT (OUTPATIENT)
Dept: LAB | Facility: MEDICAL CENTER | Age: 79
End: 2024-12-19
Payer: MEDICARE

## 2024-12-19 DIAGNOSIS — N18.4 CKD (CHRONIC KIDNEY DISEASE) STAGE 4, GFR 15-29 ML/MIN (HCC): Primary | ICD-10-CM

## 2024-12-19 DIAGNOSIS — N18.4 CKD (CHRONIC KIDNEY DISEASE) STAGE 4, GFR 15-29 ML/MIN (HCC): ICD-10-CM

## 2024-12-19 DIAGNOSIS — R10.30 LOWER ABDOMINAL PAIN: ICD-10-CM

## 2024-12-19 LAB
ALBUMIN SERPL BCG-MCNC: 3.8 G/DL (ref 3.5–5)
ALP SERPL-CCNC: 63 U/L (ref 34–104)
ALT SERPL W P-5'-P-CCNC: 9 U/L (ref 7–52)
AMYLASE SERPL-CCNC: 23 IU/L (ref 29–103)
ANION GAP SERPL CALCULATED.3IONS-SCNC: 11 MMOL/L (ref 4–13)
AST SERPL W P-5'-P-CCNC: 12 U/L (ref 13–39)
BASOPHILS # BLD AUTO: 0.03 THOUSANDS/ΜL (ref 0–0.1)
BASOPHILS NFR BLD AUTO: 0 % (ref 0–1)
BILIRUB SERPL-MCNC: 0.44 MG/DL (ref 0.2–1)
BUN SERPL-MCNC: 46 MG/DL (ref 5–25)
CALCIUM SERPL-MCNC: 9.4 MG/DL (ref 8.4–10.2)
CHLORIDE SERPL-SCNC: 104 MMOL/L (ref 96–108)
CO2 SERPL-SCNC: 24 MMOL/L (ref 21–32)
CREAT SERPL-MCNC: 2.58 MG/DL (ref 0.6–1.3)
EOSINOPHIL # BLD AUTO: 0.22 THOUSAND/ΜL (ref 0–0.61)
EOSINOPHIL NFR BLD AUTO: 3 % (ref 0–6)
ERYTHROCYTE [DISTWIDTH] IN BLOOD BY AUTOMATED COUNT: 14.9 % (ref 11.6–15.1)
GFR SERPL CREATININE-BSD FRML MDRD: 22 ML/MIN/1.73SQ M
GLUCOSE SERPL-MCNC: 131 MG/DL (ref 65–140)
HCT VFR BLD AUTO: 33.5 % (ref 36.5–49.3)
HGB BLD-MCNC: 10.8 G/DL (ref 12–17)
IMM GRANULOCYTES # BLD AUTO: 0.03 THOUSAND/UL (ref 0–0.2)
IMM GRANULOCYTES NFR BLD AUTO: 0 % (ref 0–2)
LIPASE SERPL-CCNC: 6 U/L (ref 11–82)
LYMPHOCYTES # BLD AUTO: 0.96 THOUSANDS/ΜL (ref 0.6–4.47)
LYMPHOCYTES NFR BLD AUTO: 14 % (ref 14–44)
MCH RBC QN AUTO: 30.9 PG (ref 26.8–34.3)
MCHC RBC AUTO-ENTMCNC: 32.2 G/DL (ref 31.4–37.4)
MCV RBC AUTO: 96 FL (ref 82–98)
MONOCYTES # BLD AUTO: 0.48 THOUSAND/ΜL (ref 0.17–1.22)
MONOCYTES NFR BLD AUTO: 7 % (ref 4–12)
NEUTROPHILS # BLD AUTO: 5.36 THOUSANDS/ΜL (ref 1.85–7.62)
NEUTS SEG NFR BLD AUTO: 76 % (ref 43–75)
NRBC BLD AUTO-RTO: 0 /100 WBCS
PLATELET # BLD AUTO: 145 THOUSANDS/UL (ref 149–390)
PMV BLD AUTO: 11.1 FL (ref 8.9–12.7)
POTASSIUM SERPL-SCNC: 3.8 MMOL/L (ref 3.5–5.3)
PROT SERPL-MCNC: 6.7 G/DL (ref 6.4–8.4)
RBC # BLD AUTO: 3.49 MILLION/UL (ref 3.88–5.62)
SODIUM SERPL-SCNC: 139 MMOL/L (ref 135–147)
WBC # BLD AUTO: 7.08 THOUSAND/UL (ref 4.31–10.16)

## 2024-12-19 PROCEDURE — 36415 COLL VENOUS BLD VENIPUNCTURE: CPT

## 2024-12-19 PROCEDURE — 83690 ASSAY OF LIPASE: CPT

## 2024-12-19 PROCEDURE — 82150 ASSAY OF AMYLASE: CPT

## 2024-12-19 PROCEDURE — 80053 COMPREHEN METABOLIC PANEL: CPT

## 2024-12-19 PROCEDURE — 85025 COMPLETE CBC W/AUTO DIFF WBC: CPT

## 2024-12-20 ENCOUNTER — APPOINTMENT (OUTPATIENT)
Dept: LAB | Facility: HOSPITAL | Age: 79
End: 2024-12-20
Payer: MEDICARE

## 2024-12-20 ENCOUNTER — TELEPHONE (OUTPATIENT)
Age: 79
End: 2024-12-20

## 2024-12-20 DIAGNOSIS — R10.30 LOWER ABDOMINAL PAIN: ICD-10-CM

## 2024-12-20 LAB — LACTATE SERPL-SCNC: 1.3 MMOL/L (ref 0.5–2)

## 2024-12-20 PROCEDURE — 83605 ASSAY OF LACTIC ACID: CPT

## 2024-12-20 PROCEDURE — 36415 COLL VENOUS BLD VENIPUNCTURE: CPT

## 2024-12-20 NOTE — TELEPHONE ENCOUNTER
The patient calls in today, to ask what Dr. Hadley's recommendations are after his blood work.    Should he still have the lactic acid done?    Is the CT of the abdomen going to be put in?    Please advise.  Patient is requesting a call back.

## 2024-12-20 NOTE — TELEPHONE ENCOUNTER
Spoke with marcia he will get the lab done today, the CT that is scheduled is for the chest he wants a CT of his abdomen.

## 2024-12-21 DIAGNOSIS — R10.30 LOWER ABDOMINAL PAIN: Primary | ICD-10-CM

## 2024-12-24 ENCOUNTER — ANESTHESIA EVENT (INPATIENT)
Dept: PERIOP | Facility: HOSPITAL | Age: 79
DRG: 335 | End: 2024-12-24
Payer: MEDICARE

## 2024-12-24 ENCOUNTER — ANESTHESIA (INPATIENT)
Dept: PERIOP | Facility: HOSPITAL | Age: 79
DRG: 335 | End: 2024-12-24
Payer: MEDICARE

## 2024-12-24 ENCOUNTER — APPOINTMENT (EMERGENCY)
Dept: CT IMAGING | Facility: HOSPITAL | Age: 79
End: 2024-12-24
Payer: MEDICARE

## 2024-12-24 ENCOUNTER — HOSPITAL ENCOUNTER (EMERGENCY)
Facility: HOSPITAL | Age: 79
Discharge: STILL ADMITTED OR ADMINISTRATIVE | End: 2024-12-24
Attending: EMERGENCY MEDICINE
Payer: MEDICARE

## 2024-12-24 ENCOUNTER — HOSPITAL ENCOUNTER (INPATIENT)
Facility: HOSPITAL | Age: 79
LOS: 10 days | Discharge: HOME HEALTH | DRG: 335 | End: 2025-01-03
Attending: SURGERY | Admitting: SURGERY
Payer: MEDICARE

## 2024-12-24 ENCOUNTER — APPOINTMENT (INPATIENT)
Dept: RADIOLOGY | Facility: HOSPITAL | Age: 79
DRG: 335 | End: 2024-12-24
Payer: MEDICARE

## 2024-12-24 VITALS
WEIGHT: 176.37 LBS | TEMPERATURE: 97.5 F | HEART RATE: 87 BPM | DIASTOLIC BLOOD PRESSURE: 67 MMHG | RESPIRATION RATE: 20 BRPM | SYSTOLIC BLOOD PRESSURE: 151 MMHG | BODY MASS INDEX: 25.25 KG/M2 | OXYGEN SATURATION: 95 % | HEIGHT: 70 IN

## 2024-12-24 DIAGNOSIS — R10.9 ABDOMINAL PAIN: ICD-10-CM

## 2024-12-24 DIAGNOSIS — K55.9 MESENTERIC ISCHEMIA (HCC): Primary | ICD-10-CM

## 2024-12-24 DIAGNOSIS — R11.2 NAUSEA AND VOMITING: ICD-10-CM

## 2024-12-24 DIAGNOSIS — R41.0 DELIRIUM: ICD-10-CM

## 2024-12-24 DIAGNOSIS — N18.4 CKD (CHRONIC KIDNEY DISEASE) STAGE 4, GFR 15-29 ML/MIN (HCC): ICD-10-CM

## 2024-12-24 PROBLEM — K86.89 PANCREATIC INSUFFICIENCY: Status: ACTIVE | Noted: 2024-12-24

## 2024-12-24 PROBLEM — I87.9 DISORDER OF PORTAL VENOUS SYSTEM: Status: ACTIVE | Noted: 2024-12-24

## 2024-12-24 PROBLEM — K83.8 PNEUMOBILIA: Status: ACTIVE | Noted: 2024-12-24

## 2024-12-24 PROBLEM — K63.89 PNEUMATOSIS INTESTINALIS: Status: ACTIVE | Noted: 2024-12-24

## 2024-12-24 LAB
ABO GROUP BLD: NORMAL
ALBUMIN SERPL BCG-MCNC: 2.8 G/DL (ref 3.5–5)
ALBUMIN SERPL BCG-MCNC: 4.3 G/DL (ref 3.5–5)
ALP SERPL-CCNC: 56 U/L (ref 34–104)
ALP SERPL-CCNC: 80 U/L (ref 34–104)
ALT SERPL W P-5'-P-CCNC: 5 U/L (ref 7–52)
ALT SERPL W P-5'-P-CCNC: 7 U/L (ref 7–52)
ANION GAP SERPL CALCULATED.3IONS-SCNC: 12 MMOL/L (ref 4–13)
ANION GAP SERPL CALCULATED.3IONS-SCNC: 8 MMOL/L (ref 4–13)
APTT PPP: 132 SECONDS (ref 23–34)
APTT PPP: 28 SECONDS (ref 23–34)
AST SERPL W P-5'-P-CCNC: 13 U/L (ref 13–39)
AST SERPL W P-5'-P-CCNC: 9 U/L (ref 13–39)
BACTERIA UR QL AUTO: NORMAL /HPF
BASE EXCESS BLDA CALC-SCNC: -3 MMOL/L (ref -2–3)
BASE EXCESS BLDA CALC-SCNC: -3 MMOL/L (ref -2–3)
BASE EXCESS BLDA CALC-SCNC: -6.1 MMOL/L
BASOPHILS # BLD AUTO: 0.04 THOUSANDS/ÂΜL (ref 0–0.1)
BASOPHILS NFR BLD AUTO: 0 % (ref 0–1)
BILIRUB SERPL-MCNC: 0.42 MG/DL (ref 0.2–1)
BILIRUB SERPL-MCNC: 0.43 MG/DL (ref 0.2–1)
BILIRUB UR QL STRIP: NEGATIVE
BLD GP AB SCN SERPL QL: NEGATIVE
BUN SERPL-MCNC: 36 MG/DL (ref 5–25)
BUN SERPL-MCNC: 41 MG/DL (ref 5–25)
CA-I BLD-SCNC: 1.08 MMOL/L (ref 1.12–1.32)
CA-I BLD-SCNC: 1.16 MMOL/L (ref 1.12–1.32)
CA-I BLD-SCNC: 1.21 MMOL/L (ref 1.12–1.32)
CALCIUM ALBUM COR SERPL-MCNC: 8.7 MG/DL (ref 8.3–10.1)
CALCIUM SERPL-MCNC: 7.7 MG/DL (ref 8.4–10.2)
CALCIUM SERPL-MCNC: 9.9 MG/DL (ref 8.4–10.2)
CFFFLEV: 700.7 MG/DL (ref 278–581)
CFFMA (FUNCTIONAL FIBRINOGEN MAX AMPLITUDE): 35.8 MM (ref 15–32)
CFFMA (FUNCTIONAL FIBRINOGEN MAX AMPLITUDE): 38.4 MM (ref 15–32)
CHLORIDE SERPL-SCNC: 105 MMOL/L (ref 96–108)
CHLORIDE SERPL-SCNC: 113 MMOL/L (ref 96–108)
CKA(ANGLE): 74.9 DEG (ref 63–78)
CKHR(HEPARINASE REACTION TIME): 6.2 MIN (ref 4.3–8.3)
CKK(CLOT KINETICS): 1.2 MIN (ref 0.8–2.1)
CKLY30: 0 % (ref 0–2.6)
CKMA(MAX AMPLITUDE): 68.7 MM (ref 52–69)
CKR(REACTION TIME): 13.5 MIN (ref 4.6–9.1)
CKR(REACTION TIME): 5.2 MIN (ref 4.6–9.1)
CLARITY UR: CLEAR
CO2 SERPL-SCNC: 21 MMOL/L (ref 21–32)
CO2 SERPL-SCNC: 23 MMOL/L (ref 21–32)
COLOR UR: ABNORMAL
CREAT SERPL-MCNC: 1.75 MG/DL (ref 0.6–1.3)
CREAT SERPL-MCNC: 2.08 MG/DL (ref 0.6–1.3)
CRTMA(RAPIDTEG MAX AMPLITUDE): 68.6 MM (ref 52–70)
CRTMA(RAPIDTEG MAX AMPLITUDE): 68.7 MM (ref 52–70)
EOSINOPHIL # BLD AUTO: 0.2 THOUSAND/ÂΜL (ref 0–0.61)
EOSINOPHIL NFR BLD AUTO: 2 % (ref 0–6)
ERYTHROCYTE [DISTWIDTH] IN BLOOD BY AUTOMATED COUNT: 14.9 % (ref 11.6–15.1)
ERYTHROCYTE [DISTWIDTH] IN BLOOD BY AUTOMATED COUNT: 15.1 % (ref 11.6–15.1)
EST. AVERAGE GLUCOSE BLD GHB EST-MCNC: 143 MG/DL
GFR SERPL CREATININE-BSD FRML MDRD: 28 ML/MIN/1.73SQ M
GFR SERPL CREATININE-BSD FRML MDRD: 35 ML/MIN/1.73SQ M
GLUCOSE SERPL-MCNC: 111 MG/DL (ref 65–140)
GLUCOSE SERPL-MCNC: 123 MG/DL (ref 65–140)
GLUCOSE SERPL-MCNC: 179 MG/DL (ref 65–140)
GLUCOSE SERPL-MCNC: 200 MG/DL (ref 65–140)
GLUCOSE SERPL-MCNC: 202 MG/DL (ref 65–140)
GLUCOSE UR STRIP-MCNC: NEGATIVE MG/DL
HBA1C MFR BLD: 6.6 %
HCO3 BLDA-SCNC: 20 MMOL/L (ref 22–28)
HCO3 BLDA-SCNC: 22.5 MMOL/L (ref 22–28)
HCO3 BLDA-SCNC: 22.7 MMOL/L (ref 24–30)
HCT VFR BLD AUTO: 28.1 % (ref 36.5–49.3)
HCT VFR BLD AUTO: 36.6 % (ref 36.5–49.3)
HCT VFR BLD CALC: 25 % (ref 36.5–49.3)
HCT VFR BLD CALC: 27 % (ref 36.5–49.3)
HGB BLD-MCNC: 12.2 G/DL (ref 12–17)
HGB BLD-MCNC: 9.1 G/DL (ref 12–17)
HGB BLD-MCNC: 9.2 G/DL (ref 12–17)
HGB BLDA-MCNC: 8.5 G/DL (ref 12–17)
HGB BLDA-MCNC: 9.2 G/DL (ref 12–17)
HGB UR QL STRIP.AUTO: NEGATIVE
IMM GRANULOCYTES # BLD AUTO: 0.06 THOUSAND/UL (ref 0–0.2)
IMM GRANULOCYTES NFR BLD AUTO: 1 % (ref 0–2)
INR PPP: 1.02 (ref 0.85–1.19)
INR PPP: 1.29 (ref 0.85–1.19)
KETONES UR STRIP-MCNC: NEGATIVE MG/DL
LACTATE SERPL-SCNC: 1 MMOL/L (ref 0.5–2)
LACTATE SERPL-SCNC: 1.7 MMOL/L (ref 0.5–2)
LEUKOCYTE ESTERASE UR QL STRIP: NEGATIVE
LIPASE SERPL-CCNC: 7 U/L (ref 11–82)
LYMPHOCYTES # BLD AUTO: 0.86 THOUSANDS/ÂΜL (ref 0.6–4.47)
LYMPHOCYTES NFR BLD AUTO: 8 % (ref 14–44)
MAGNESIUM SERPL-MCNC: 1.4 MG/DL (ref 1.9–2.7)
MCH RBC QN AUTO: 31.1 PG (ref 26.8–34.3)
MCH RBC QN AUTO: 31.4 PG (ref 26.8–34.3)
MCHC RBC AUTO-ENTMCNC: 32.4 G/DL (ref 31.4–37.4)
MCHC RBC AUTO-ENTMCNC: 33.3 G/DL (ref 31.4–37.4)
MCV RBC AUTO: 94 FL (ref 82–98)
MCV RBC AUTO: 96 FL (ref 82–98)
MONOCYTES # BLD AUTO: 0.38 THOUSAND/ÂΜL (ref 0.17–1.22)
MONOCYTES NFR BLD AUTO: 3 % (ref 4–12)
NEUTROPHILS # BLD AUTO: 9.57 THOUSANDS/ÂΜL (ref 1.85–7.62)
NEUTS SEG NFR BLD AUTO: 86 % (ref 43–75)
NITRITE UR QL STRIP: NEGATIVE
NON-SQ EPI CELLS URNS QL MICRO: NORMAL /HPF
NRBC BLD AUTO-RTO: 0 /100 WBCS
O2 CT BLDA-SCNC: 12.7 ML/DL (ref 16–23)
OXYHGB MFR BLDA: 89.9 % (ref 94–97)
PCO2 BLD: 24 MMOL/L (ref 21–32)
PCO2 BLD: 24 MMOL/L (ref 21–32)
PCO2 BLD: 41.3 MM HG (ref 36–44)
PCO2 BLD: 42.4 MM HG (ref 42–50)
PCO2 BLDA: 42.1 MM HG (ref 36–44)
PH BLD: 7.34 [PH] (ref 7.35–7.45)
PH BLD: 7.34 [PH] (ref 7.3–7.4)
PH BLDA: 7.29 [PH] (ref 7.35–7.45)
PH UR STRIP.AUTO: 5.5 [PH]
PHOSPHATE SERPL-MCNC: 3.3 MG/DL (ref 2.3–4.1)
PLATELET # BLD AUTO: 147 THOUSANDS/UL (ref 149–390)
PLATELET # BLD AUTO: 186 THOUSANDS/UL (ref 149–390)
PMV BLD AUTO: 10.4 FL (ref 8.9–12.7)
PMV BLD AUTO: 11 FL (ref 8.9–12.7)
PO2 BLD: 220 MM HG (ref 35–45)
PO2 BLD: 222 MM HG (ref 75–129)
PO2 BLDA: 68.8 MM HG (ref 75–129)
POTASSIUM BLD-SCNC: 3.6 MMOL/L (ref 3.5–5.3)
POTASSIUM BLD-SCNC: 3.6 MMOL/L (ref 3.5–5.3)
POTASSIUM SERPL-SCNC: 3.6 MMOL/L (ref 3.5–5.3)
POTASSIUM SERPL-SCNC: 3.7 MMOL/L (ref 3.5–5.3)
PROT SERPL-MCNC: 5.2 G/DL (ref 6.4–8.4)
PROT SERPL-MCNC: 7.9 G/DL (ref 6.4–8.4)
PROT UR STRIP-MCNC: ABNORMAL MG/DL
PROTHROMBIN TIME: 14.1 SECONDS (ref 12.3–15)
PROTHROMBIN TIME: 16.3 SECONDS (ref 12.3–15)
RBC # BLD AUTO: 2.93 MILLION/UL (ref 3.88–5.62)
RBC # BLD AUTO: 3.89 MILLION/UL (ref 3.88–5.62)
RBC #/AREA URNS AUTO: NORMAL /HPF
RH BLD: POSITIVE
SAO2 % BLD FROM PO2: 100 % (ref 60–85)
SAO2 % BLD FROM PO2: 100 % (ref 60–85)
SODIUM BLD-SCNC: 145 MMOL/L (ref 136–145)
SODIUM BLD-SCNC: 145 MMOL/L (ref 136–145)
SODIUM SERPL-SCNC: 140 MMOL/L (ref 135–147)
SODIUM SERPL-SCNC: 142 MMOL/L (ref 135–147)
SP GR UR STRIP.AUTO: 1.01 (ref 1–1.03)
SPECIMEN EXPIRATION DATE: NORMAL
SPECIMEN SOURCE: ABNORMAL
UROBILINOGEN UR STRIP-ACNC: <2 MG/DL
WBC # BLD AUTO: 11.11 THOUSAND/UL (ref 4.31–10.16)
WBC # BLD AUTO: 16.68 THOUSAND/UL (ref 4.31–10.16)
WBC #/AREA URNS AUTO: NORMAL /HPF

## 2024-12-24 PROCEDURE — 82947 ASSAY GLUCOSE BLOOD QUANT: CPT

## 2024-12-24 PROCEDURE — C1894 INTRO/SHEATH, NON-LASER: HCPCS | Performed by: SURGERY

## 2024-12-24 PROCEDURE — 36415 COLL VENOUS BLD VENIPUNCTURE: CPT | Performed by: EMERGENCY MEDICINE

## 2024-12-24 PROCEDURE — 74174 CTA ABD&PLVS W/CONTRAST: CPT

## 2024-12-24 PROCEDURE — C1874 STENT, COATED/COV W/DEL SYS: HCPCS | Performed by: SURGERY

## 2024-12-24 PROCEDURE — 85576 BLOOD PLATELET AGGREGATION: CPT | Performed by: NURSE ANESTHETIST, CERTIFIED REGISTERED

## 2024-12-24 PROCEDURE — 30233K1 TRANSFUSION OF NONAUTOLOGOUS FROZEN PLASMA INTO PERIPHERAL VEIN, PERCUTANEOUS APPROACH: ICD-10-PCS | Performed by: SURGERY

## 2024-12-24 PROCEDURE — 83605 ASSAY OF LACTIC ACID: CPT

## 2024-12-24 PROCEDURE — 82948 REAGENT STRIP/BLOOD GLUCOSE: CPT

## 2024-12-24 PROCEDURE — 82805 BLOOD GASES W/O2 SATURATION: CPT | Performed by: SURGERY

## 2024-12-24 PROCEDURE — 99285 EMERGENCY DEPT VISIT HI MDM: CPT | Performed by: EMERGENCY MEDICINE

## 2024-12-24 PROCEDURE — 04750DZ DILATION OF SUPERIOR MESENTERIC ARTERY WITH INTRALUMINAL DEVICE, OPEN APPROACH: ICD-10-PCS | Performed by: SURGERY

## 2024-12-24 PROCEDURE — 99223 1ST HOSP IP/OBS HIGH 75: CPT | Performed by: SURGERY

## 2024-12-24 PROCEDURE — 82330 ASSAY OF CALCIUM: CPT | Performed by: SURGERY

## 2024-12-24 PROCEDURE — 85610 PROTHROMBIN TIME: CPT | Performed by: SURGERY

## 2024-12-24 PROCEDURE — 84295 ASSAY OF SERUM SODIUM: CPT

## 2024-12-24 PROCEDURE — C1769 GUIDE WIRE: HCPCS | Performed by: SURGERY

## 2024-12-24 PROCEDURE — 84132 ASSAY OF SERUM POTASSIUM: CPT

## 2024-12-24 PROCEDURE — 85347 COAGULATION TIME ACTIVATED: CPT | Performed by: SURGERY

## 2024-12-24 PROCEDURE — 85018 HEMOGLOBIN: CPT | Performed by: SURGERY

## 2024-12-24 PROCEDURE — 85397 CLOTTING FUNCT ACTIVITY: CPT | Performed by: NURSE ANESTHETIST, CERTIFIED REGISTERED

## 2024-12-24 PROCEDURE — 85576 BLOOD PLATELET AGGREGATION: CPT | Performed by: SURGERY

## 2024-12-24 PROCEDURE — 99285 EMERGENCY DEPT VISIT HI MDM: CPT

## 2024-12-24 PROCEDURE — 85027 COMPLETE CBC AUTOMATED: CPT | Performed by: SURGERY

## 2024-12-24 PROCEDURE — 44005 FREEING OF BOWEL ADHESION: CPT | Performed by: SURGERY

## 2024-12-24 PROCEDURE — 82330 ASSAY OF CALCIUM: CPT

## 2024-12-24 PROCEDURE — C1725 CATH, TRANSLUMIN NON-LASER: HCPCS | Performed by: SURGERY

## 2024-12-24 PROCEDURE — 84100 ASSAY OF PHOSPHORUS: CPT | Performed by: SURGERY

## 2024-12-24 PROCEDURE — P9017 PLASMA 1 DONOR FRZ W/IN 8 HR: HCPCS

## 2024-12-24 PROCEDURE — 83036 HEMOGLOBIN GLYCOSYLATED A1C: CPT | Performed by: SURGERY

## 2024-12-24 PROCEDURE — 85025 COMPLETE CBC W/AUTO DIFF WBC: CPT | Performed by: EMERGENCY MEDICINE

## 2024-12-24 PROCEDURE — 83605 ASSAY OF LACTIC ACID: CPT | Performed by: SURGERY

## 2024-12-24 PROCEDURE — 0DN80ZZ RELEASE SMALL INTESTINE, OPEN APPROACH: ICD-10-PCS | Performed by: SURGERY

## 2024-12-24 PROCEDURE — 82803 BLOOD GASES ANY COMBINATION: CPT

## 2024-12-24 PROCEDURE — 74176 CT ABD & PELVIS W/O CONTRAST: CPT

## 2024-12-24 PROCEDURE — 85347 COAGULATION TIME ACTIVATED: CPT | Performed by: NURSE ANESTHETIST, CERTIFIED REGISTERED

## 2024-12-24 PROCEDURE — 86923 COMPATIBILITY TEST ELECTRIC: CPT

## 2024-12-24 PROCEDURE — 96365 THER/PROPH/DIAG IV INF INIT: CPT

## 2024-12-24 PROCEDURE — 99222 1ST HOSP IP/OBS MODERATE 55: CPT | Performed by: INTERNAL MEDICINE

## 2024-12-24 PROCEDURE — 85384 FIBRINOGEN ACTIVITY: CPT | Performed by: NURSE ANESTHETIST, CERTIFIED REGISTERED

## 2024-12-24 PROCEDURE — 80053 COMPREHEN METABOLIC PANEL: CPT | Performed by: SURGERY

## 2024-12-24 PROCEDURE — 85384 FIBRINOGEN ACTIVITY: CPT | Performed by: SURGERY

## 2024-12-24 PROCEDURE — 99223 1ST HOSP IP/OBS HIGH 75: CPT

## 2024-12-24 PROCEDURE — 86850 RBC ANTIBODY SCREEN: CPT

## 2024-12-24 PROCEDURE — 96375 TX/PRO/DX INJ NEW DRUG ADDON: CPT

## 2024-12-24 PROCEDURE — 85730 THROMBOPLASTIN TIME PARTIAL: CPT

## 2024-12-24 PROCEDURE — 83690 ASSAY OF LIPASE: CPT | Performed by: EMERGENCY MEDICINE

## 2024-12-24 PROCEDURE — 83735 ASSAY OF MAGNESIUM: CPT | Performed by: SURGERY

## 2024-12-24 PROCEDURE — C1887 CATHETER, GUIDING: HCPCS | Performed by: SURGERY

## 2024-12-24 PROCEDURE — 80053 COMPREHEN METABOLIC PANEL: CPT | Performed by: EMERGENCY MEDICINE

## 2024-12-24 PROCEDURE — 86900 BLOOD TYPING SEROLOGIC ABO: CPT

## 2024-12-24 PROCEDURE — 85610 PROTHROMBIN TIME: CPT

## 2024-12-24 PROCEDURE — 81001 URINALYSIS AUTO W/SCOPE: CPT

## 2024-12-24 PROCEDURE — 85730 THROMBOPLASTIN TIME PARTIAL: CPT | Performed by: SURGERY

## 2024-12-24 PROCEDURE — NC001 PR NO CHARGE: Performed by: SURGERY

## 2024-12-24 PROCEDURE — 99284 EMERGENCY DEPT VISIT MOD MDM: CPT

## 2024-12-24 PROCEDURE — 36200 PLACE CATHETER IN AORTA: CPT | Performed by: SURGERY

## 2024-12-24 PROCEDURE — 85014 HEMATOCRIT: CPT

## 2024-12-24 PROCEDURE — 86901 BLOOD TYPING SEROLOGIC RH(D): CPT

## 2024-12-24 PROCEDURE — 96376 TX/PRO/DX INJ SAME DRUG ADON: CPT

## 2024-12-24 PROCEDURE — 37236 OPEN/PERQ PLACE STENT 1ST: CPT | Performed by: SURGERY

## 2024-12-24 PROCEDURE — 85397 CLOTTING FUNCT ACTIVITY: CPT | Performed by: SURGERY

## 2024-12-24 DEVICE — BX2 HEP REDUCED PROFILE BX2 7MMX19MM 6FR 135CM CATH
Type: IMPLANTABLE DEVICE | Site: ARTERIAL | Status: FUNCTIONAL
Brand: GORE VIABAHN VBX BALLOON EXPANDABLE ENDO

## 2024-12-24 RX ORDER — MAGNESIUM SULFATE HEPTAHYDRATE 40 MG/ML
2 INJECTION, SOLUTION INTRAVENOUS ONCE
Status: COMPLETED | OUTPATIENT
Start: 2024-12-24 | End: 2024-12-24

## 2024-12-24 RX ORDER — SODIUM CHLORIDE, SODIUM GLUCONATE, SODIUM ACETATE, POTASSIUM CHLORIDE, MAGNESIUM CHLORIDE, SODIUM PHOSPHATE, DIBASIC, AND POTASSIUM PHOSPHATE .53; .5; .37; .037; .03; .012; .00082 G/100ML; G/100ML; G/100ML; G/100ML; G/100ML; G/100ML; G/100ML
1000 INJECTION, SOLUTION INTRAVENOUS ONCE
Status: COMPLETED | OUTPATIENT
Start: 2024-12-24 | End: 2024-12-24

## 2024-12-24 RX ORDER — LABETALOL HYDROCHLORIDE 5 MG/ML
10 INJECTION, SOLUTION INTRAVENOUS EVERY 4 HOURS PRN
Status: DISCONTINUED | OUTPATIENT
Start: 2024-12-24 | End: 2024-12-26

## 2024-12-24 RX ORDER — SODIUM CHLORIDE 9 MG/ML
INJECTION, SOLUTION INTRAVENOUS CONTINUOUS PRN
Status: DISCONTINUED | OUTPATIENT
Start: 2024-12-24 | End: 2024-12-24

## 2024-12-24 RX ORDER — HYDROMORPHONE HCL/PF 1 MG/ML
SYRINGE (ML) INJECTION AS NEEDED
Status: DISCONTINUED | OUTPATIENT
Start: 2024-12-24 | End: 2024-12-24

## 2024-12-24 RX ORDER — MAGNESIUM HYDROXIDE 1200 MG/15ML
LIQUID ORAL AS NEEDED
Status: DISCONTINUED | OUTPATIENT
Start: 2024-12-24 | End: 2024-12-24 | Stop reason: HOSPADM

## 2024-12-24 RX ORDER — CEFAZOLIN SODIUM 2 G/50ML
2000 SOLUTION INTRAVENOUS ONCE
Status: DISCONTINUED | OUTPATIENT
Start: 2024-12-24 | End: 2024-12-25

## 2024-12-24 RX ORDER — ONDANSETRON 2 MG/ML
4 INJECTION INTRAMUSCULAR; INTRAVENOUS ONCE
Status: COMPLETED | OUTPATIENT
Start: 2024-12-24 | End: 2024-12-24

## 2024-12-24 RX ORDER — HEPARIN SODIUM 1000 [USP'U]/ML
6400 INJECTION, SOLUTION INTRAVENOUS; SUBCUTANEOUS ONCE
Status: DISCONTINUED | OUTPATIENT
Start: 2024-12-24 | End: 2024-12-24

## 2024-12-24 RX ORDER — ONDANSETRON 2 MG/ML
4 INJECTION INTRAMUSCULAR; INTRAVENOUS EVERY 6 HOURS PRN
Status: DISCONTINUED | OUTPATIENT
Start: 2024-12-24 | End: 2024-12-24

## 2024-12-24 RX ORDER — HEPARIN SODIUM 10000 [USP'U]/100ML
3-30 INJECTION, SOLUTION INTRAVENOUS
Status: DISCONTINUED | OUTPATIENT
Start: 2024-12-24 | End: 2024-12-24

## 2024-12-24 RX ORDER — HEPARIN SODIUM 10000 [USP'U]/100ML
3-30 INJECTION, SOLUTION INTRAVENOUS
Status: DISCONTINUED | OUTPATIENT
Start: 2024-12-24 | End: 2024-12-24 | Stop reason: HOSPADM

## 2024-12-24 RX ORDER — INSULIN LISPRO 100 [IU]/ML
1-5 INJECTION, SOLUTION INTRAVENOUS; SUBCUTANEOUS EVERY 6 HOURS SCHEDULED
Status: DISCONTINUED | OUTPATIENT
Start: 2024-12-24 | End: 2024-12-26

## 2024-12-24 RX ORDER — ONDANSETRON 2 MG/ML
4 INJECTION INTRAMUSCULAR; INTRAVENOUS ONCE AS NEEDED
Status: DISCONTINUED | OUTPATIENT
Start: 2024-12-24 | End: 2024-12-24 | Stop reason: HOSPADM

## 2024-12-24 RX ORDER — SODIUM CHLORIDE, SODIUM LACTATE, POTASSIUM CHLORIDE, CALCIUM CHLORIDE 600; 310; 30; 20 MG/100ML; MG/100ML; MG/100ML; MG/100ML
125 INJECTION, SOLUTION INTRAVENOUS CONTINUOUS
Status: DISCONTINUED | OUTPATIENT
Start: 2024-12-24 | End: 2024-12-24

## 2024-12-24 RX ORDER — ACETAMINOPHEN 10 MG/ML
1000 INJECTION, SOLUTION INTRAVENOUS EVERY 6 HOURS SCHEDULED
Status: DISCONTINUED | OUTPATIENT
Start: 2024-12-24 | End: 2024-12-26

## 2024-12-24 RX ORDER — SODIUM CHLORIDE, SODIUM LACTATE, POTASSIUM CHLORIDE, CALCIUM CHLORIDE 600; 310; 30; 20 MG/100ML; MG/100ML; MG/100ML; MG/100ML
INJECTION, SOLUTION INTRAVENOUS CONTINUOUS PRN
Status: DISCONTINUED | OUTPATIENT
Start: 2024-12-24 | End: 2024-12-24

## 2024-12-24 RX ORDER — ONDANSETRON 2 MG/ML
4 INJECTION INTRAMUSCULAR; INTRAVENOUS EVERY 4 HOURS PRN
Status: DISCONTINUED | OUTPATIENT
Start: 2024-12-24 | End: 2025-01-03 | Stop reason: HOSPADM

## 2024-12-24 RX ORDER — HYDROMORPHONE HCL IN WATER/PF 6 MG/30 ML
0.2 PATIENT CONTROLLED ANALGESIA SYRINGE INTRAVENOUS
Status: DISCONTINUED | OUTPATIENT
Start: 2024-12-24 | End: 2024-12-24 | Stop reason: HOSPADM

## 2024-12-24 RX ORDER — POTASSIUM CHLORIDE 29.8 MG/ML
40 INJECTION INTRAVENOUS ONCE
Status: COMPLETED | OUTPATIENT
Start: 2024-12-24 | End: 2024-12-24

## 2024-12-24 RX ORDER — METOPROLOL TARTRATE 1 MG/ML
2.5 INJECTION, SOLUTION INTRAVENOUS EVERY 8 HOURS
Status: DISCONTINUED | OUTPATIENT
Start: 2024-12-24 | End: 2024-12-26

## 2024-12-24 RX ORDER — SODIUM CHLORIDE, SODIUM GLUCONATE, SODIUM ACETATE, POTASSIUM CHLORIDE, MAGNESIUM CHLORIDE, SODIUM PHOSPHATE, DIBASIC, AND POTASSIUM PHOSPHATE .53; .5; .37; .037; .03; .012; .00082 G/100ML; G/100ML; G/100ML; G/100ML; G/100ML; G/100ML; G/100ML
75 INJECTION, SOLUTION INTRAVENOUS CONTINUOUS
Status: DISCONTINUED | OUTPATIENT
Start: 2024-12-24 | End: 2024-12-24 | Stop reason: HOSPADM

## 2024-12-24 RX ORDER — HEPARIN SODIUM 1000 [USP'U]/ML
3200 INJECTION, SOLUTION INTRAVENOUS; SUBCUTANEOUS EVERY 6 HOURS PRN
Status: DISCONTINUED | OUTPATIENT
Start: 2024-12-24 | End: 2024-12-24 | Stop reason: HOSPADM

## 2024-12-24 RX ORDER — FENTANYL CITRATE/PF 50 MCG/ML
25 SYRINGE (ML) INJECTION
Status: DISCONTINUED | OUTPATIENT
Start: 2024-12-24 | End: 2024-12-24 | Stop reason: HOSPADM

## 2024-12-24 RX ORDER — HEPARIN SODIUM 1000 [USP'U]/ML
INJECTION, SOLUTION INTRAVENOUS; SUBCUTANEOUS AS NEEDED
Status: DISCONTINUED | OUTPATIENT
Start: 2024-12-24 | End: 2024-12-24

## 2024-12-24 RX ORDER — HEPARIN SODIUM 1000 [USP'U]/ML
6400 INJECTION, SOLUTION INTRAVENOUS; SUBCUTANEOUS EVERY 6 HOURS PRN
Status: DISCONTINUED | OUTPATIENT
Start: 2024-12-24 | End: 2024-12-24

## 2024-12-24 RX ORDER — IODIXANOL 320 MG/ML
INJECTION, SOLUTION INTRAVASCULAR AS NEEDED
Status: DISCONTINUED | OUTPATIENT
Start: 2024-12-24 | End: 2024-12-24 | Stop reason: HOSPADM

## 2024-12-24 RX ORDER — HYDROMORPHONE HCL IN WATER/PF 6 MG/30 ML
0.2 PATIENT CONTROLLED ANALGESIA SYRINGE INTRAVENOUS
Refills: 0 | Status: DISCONTINUED | OUTPATIENT
Start: 2024-12-24 | End: 2024-12-24

## 2024-12-24 RX ORDER — DEXAMETHASONE SODIUM PHOSPHATE 10 MG/ML
8 INJECTION, SOLUTION INTRAMUSCULAR; INTRAVENOUS ONCE
Status: DISCONTINUED | OUTPATIENT
Start: 2024-12-24 | End: 2024-12-24

## 2024-12-24 RX ORDER — ROCURONIUM BROMIDE 10 MG/ML
INJECTION, SOLUTION INTRAVENOUS AS NEEDED
Status: DISCONTINUED | OUTPATIENT
Start: 2024-12-24 | End: 2024-12-24

## 2024-12-24 RX ORDER — FENTANYL CITRATE 50 UG/ML
INJECTION, SOLUTION INTRAMUSCULAR; INTRAVENOUS AS NEEDED
Status: DISCONTINUED | OUTPATIENT
Start: 2024-12-24 | End: 2024-12-24

## 2024-12-24 RX ORDER — HEPARIN SODIUM 5000 [USP'U]/ML
5000 INJECTION, SOLUTION INTRAVENOUS; SUBCUTANEOUS EVERY 8 HOURS SCHEDULED
Status: DISCONTINUED | OUTPATIENT
Start: 2024-12-24 | End: 2025-01-03 | Stop reason: HOSPADM

## 2024-12-24 RX ORDER — SODIUM CHLORIDE, SODIUM GLUCONATE, SODIUM ACETATE, POTASSIUM CHLORIDE, MAGNESIUM CHLORIDE, SODIUM PHOSPHATE, DIBASIC, AND POTASSIUM PHOSPHATE .53; .5; .37; .037; .03; .012; .00082 G/100ML; G/100ML; G/100ML; G/100ML; G/100ML; G/100ML; G/100ML
125 INJECTION, SOLUTION INTRAVENOUS CONTINUOUS
Status: DISCONTINUED | OUTPATIENT
Start: 2024-12-24 | End: 2024-12-26

## 2024-12-24 RX ORDER — LIDOCAINE HYDROCHLORIDE 10 MG/ML
INJECTION, SOLUTION EPIDURAL; INFILTRATION; INTRACAUDAL; PERINEURAL AS NEEDED
Status: DISCONTINUED | OUTPATIENT
Start: 2024-12-24 | End: 2024-12-24

## 2024-12-24 RX ORDER — CHLORHEXIDINE GLUCONATE ORAL RINSE 1.2 MG/ML
15 SOLUTION DENTAL ONCE
Status: COMPLETED | OUTPATIENT
Start: 2024-12-24 | End: 2024-12-24

## 2024-12-24 RX ORDER — PANTOPRAZOLE SODIUM 40 MG/10ML
40 INJECTION, POWDER, LYOPHILIZED, FOR SOLUTION INTRAVENOUS EVERY 12 HOURS SCHEDULED
Status: DISCONTINUED | OUTPATIENT
Start: 2024-12-24 | End: 2024-12-26

## 2024-12-24 RX ORDER — HYDROMORPHONE HCL/PF 1 MG/ML
0.5 SYRINGE (ML) INJECTION
Refills: 0 | Status: DISCONTINUED | OUTPATIENT
Start: 2024-12-24 | End: 2024-12-24

## 2024-12-24 RX ORDER — HYDROMORPHONE HCL/PF 1 MG/ML
0.5 SYRINGE (ML) INJECTION
Refills: 0 | Status: DISCONTINUED | OUTPATIENT
Start: 2024-12-24 | End: 2025-01-03 | Stop reason: HOSPADM

## 2024-12-24 RX ORDER — NICOTINE 21 MG/24HR
1 PATCH, TRANSDERMAL 24 HOURS TRANSDERMAL DAILY
Status: DISCONTINUED | OUTPATIENT
Start: 2024-12-24 | End: 2025-01-03 | Stop reason: HOSPADM

## 2024-12-24 RX ORDER — HEPARIN SODIUM 1000 [USP'U]/ML
6400 INJECTION, SOLUTION INTRAVENOUS; SUBCUTANEOUS ONCE
Status: COMPLETED | OUTPATIENT
Start: 2024-12-24 | End: 2024-12-24

## 2024-12-24 RX ORDER — HYDROMORPHONE HCL/PF 1 MG/ML
0.5 SYRINGE (ML) INJECTION ONCE
Status: COMPLETED | OUTPATIENT
Start: 2024-12-24 | End: 2024-12-24

## 2024-12-24 RX ORDER — HEPARIN SODIUM 1000 [USP'U]/ML
3200 INJECTION, SOLUTION INTRAVENOUS; SUBCUTANEOUS EVERY 6 HOURS PRN
Status: DISCONTINUED | OUTPATIENT
Start: 2024-12-24 | End: 2024-12-24

## 2024-12-24 RX ORDER — CEFAZOLIN SODIUM 1 G/3ML
INJECTION, POWDER, FOR SOLUTION INTRAMUSCULAR; INTRAVENOUS AS NEEDED
Status: DISCONTINUED | OUTPATIENT
Start: 2024-12-24 | End: 2024-12-24

## 2024-12-24 RX ORDER — METRONIDAZOLE 500 MG/100ML
INJECTION, SOLUTION INTRAVENOUS CONTINUOUS PRN
Status: DISCONTINUED | OUTPATIENT
Start: 2024-12-24 | End: 2024-12-24

## 2024-12-24 RX ORDER — ONDANSETRON 2 MG/ML
INJECTION INTRAMUSCULAR; INTRAVENOUS AS NEEDED
Status: DISCONTINUED | OUTPATIENT
Start: 2024-12-24 | End: 2024-12-24

## 2024-12-24 RX ORDER — PROPOFOL 10 MG/ML
INJECTION, EMULSION INTRAVENOUS AS NEEDED
Status: DISCONTINUED | OUTPATIENT
Start: 2024-12-24 | End: 2024-12-24

## 2024-12-24 RX ORDER — HYDROMORPHONE HCL/PF 1 MG/ML
0.5 SYRINGE (ML) INJECTION ONCE
Refills: 0 | Status: COMPLETED | OUTPATIENT
Start: 2024-12-24 | End: 2024-12-24

## 2024-12-24 RX ORDER — HEPARIN SODIUM 1000 [USP'U]/ML
6400 INJECTION, SOLUTION INTRAVENOUS; SUBCUTANEOUS EVERY 6 HOURS PRN
Status: DISCONTINUED | OUTPATIENT
Start: 2024-12-24 | End: 2024-12-24 | Stop reason: HOSPADM

## 2024-12-24 RX ADMIN — LABETALOL HYDROCHLORIDE 10 MG: 5 INJECTION, SOLUTION INTRAVENOUS at 23:23

## 2024-12-24 RX ADMIN — CALCIUM GLUCONATE 3 G: 98 INJECTION, SOLUTION INTRAVENOUS at 18:02

## 2024-12-24 RX ADMIN — PROPOFOL 60 MG: 10 INJECTION, EMULSION INTRAVENOUS at 12:53

## 2024-12-24 RX ADMIN — POTASSIUM CHLORIDE 40 MEQ: 29.8 INJECTION, SOLUTION INTRAVENOUS at 17:49

## 2024-12-24 RX ADMIN — HEPARIN SODIUM 6400 UNITS: 1000 INJECTION, SOLUTION INTRAVENOUS; SUBCUTANEOUS at 06:08

## 2024-12-24 RX ADMIN — PANTOPRAZOLE SODIUM 40 MG: 40 INJECTION, POWDER, FOR SOLUTION INTRAVENOUS at 21:15

## 2024-12-24 RX ADMIN — METOROPROLOL TARTRATE 2.5 MG: 5 INJECTION, SOLUTION INTRAVENOUS at 18:03

## 2024-12-24 RX ADMIN — HEPARIN SODIUM 18 UNITS/KG/HR: 10000 INJECTION, SOLUTION INTRAVENOUS at 06:09

## 2024-12-24 RX ADMIN — ACETAMINOPHEN 1000 MG: 1000 INJECTION, SOLUTION INTRAVENOUS at 23:49

## 2024-12-24 RX ADMIN — SODIUM CHLORIDE, SODIUM LACTATE, POTASSIUM CHLORIDE, AND CALCIUM CHLORIDE: .6; .31; .03; .02 INJECTION, SOLUTION INTRAVENOUS at 12:44

## 2024-12-24 RX ADMIN — METRONIDAZOLE: 500 INJECTION, SOLUTION INTRAVENOUS at 13:16

## 2024-12-24 RX ADMIN — FENTANYL CITRATE 25 MCG: 50 INJECTION INTRAMUSCULAR; INTRAVENOUS at 16:35

## 2024-12-24 RX ADMIN — SUGAMMADEX 200 MG: 100 INJECTION, SOLUTION INTRAVENOUS at 16:10

## 2024-12-24 RX ADMIN — CHLORHEXIDINE GLUCONATE 15 ML: 1.2 SOLUTION ORAL at 11:28

## 2024-12-24 RX ADMIN — SODIUM CHLORIDE: 0.9 INJECTION, SOLUTION INTRAVENOUS at 13:03

## 2024-12-24 RX ADMIN — CEFAZOLIN 2000 MG: 1 INJECTION, POWDER, FOR SOLUTION INTRAMUSCULAR; INTRAVENOUS at 13:19

## 2024-12-24 RX ADMIN — ROCURONIUM BROMIDE 50 MG: 10 INJECTION, SOLUTION INTRAVENOUS at 12:53

## 2024-12-24 RX ADMIN — LIDOCAINE HYDROCHLORIDE 50 MG: 10 INJECTION, SOLUTION EPIDURAL; INFILTRATION; INTRACAUDAL; PERINEURAL at 12:53

## 2024-12-24 RX ADMIN — ONDANSETRON 4 MG: 2 INJECTION INTRAMUSCULAR; INTRAVENOUS at 01:42

## 2024-12-24 RX ADMIN — FENTANYL CITRATE 50 MCG: 50 INJECTION INTRAMUSCULAR; INTRAVENOUS at 13:43

## 2024-12-24 RX ADMIN — LIDOCAINE HYDROCHLORIDE 50 MG: 10 INJECTION, SOLUTION EPIDURAL; INFILTRATION; INTRACAUDAL; PERINEURAL at 13:43

## 2024-12-24 RX ADMIN — INSULIN LISPRO 1 UNITS: 100 INJECTION, SOLUTION INTRAVENOUS; SUBCUTANEOUS at 17:58

## 2024-12-24 RX ADMIN — FENTANYL CITRATE 100 MCG: 50 INJECTION INTRAMUSCULAR; INTRAVENOUS at 12:53

## 2024-12-24 RX ADMIN — MAGNESIUM SULFATE HEPTAHYDRATE 2 G: 40 INJECTION, SOLUTION INTRAVENOUS at 17:49

## 2024-12-24 RX ADMIN — ONDANSETRON 4 MG: 2 INJECTION INTRAMUSCULAR; INTRAVENOUS at 16:09

## 2024-12-24 RX ADMIN — IOHEXOL 85 ML: 350 INJECTION, SOLUTION INTRAVENOUS at 05:48

## 2024-12-24 RX ADMIN — HYDROMORPHONE HYDROCHLORIDE 0.5 MG: 1 INJECTION, SOLUTION INTRAMUSCULAR; INTRAVENOUS; SUBCUTANEOUS at 03:28

## 2024-12-24 RX ADMIN — SODIUM CHLORIDE: 9 INJECTION INTRAMUSCULAR; INTRAVENOUS; SUBCUTANEOUS at 17:00

## 2024-12-24 RX ADMIN — SODIUM CHLORIDE, SODIUM LACTATE, POTASSIUM CHLORIDE, AND CALCIUM CHLORIDE 125 ML/HR: .6; .31; .03; .02 INJECTION, SOLUTION INTRAVENOUS at 11:28

## 2024-12-24 RX ADMIN — FENTANYL CITRATE 50 MCG: 50 INJECTION INTRAMUSCULAR; INTRAVENOUS at 14:44

## 2024-12-24 RX ADMIN — HYDROMORPHONE HYDROCHLORIDE 0.5 MG: 1 INJECTION, SOLUTION INTRAMUSCULAR; INTRAVENOUS; SUBCUTANEOUS at 16:19

## 2024-12-24 RX ADMIN — HEPARIN SODIUM 4000 UNITS: 1000 INJECTION INTRAVENOUS; SUBCUTANEOUS at 14:29

## 2024-12-24 RX ADMIN — HYDROMORPHONE HYDROCHLORIDE 0.5 MG: 1 INJECTION, SOLUTION INTRAMUSCULAR; INTRAVENOUS; SUBCUTANEOUS at 01:43

## 2024-12-24 RX ADMIN — SODIUM CHLORIDE, SODIUM GLUCONATE, SODIUM ACETATE, POTASSIUM CHLORIDE, MAGNESIUM CHLORIDE, SODIUM PHOSPHATE, DIBASIC, AND POTASSIUM PHOSPHATE 1000 ML: .53; .5; .37; .037; .03; .012; .00082 INJECTION, SOLUTION INTRAVENOUS at 05:59

## 2024-12-24 RX ADMIN — HEPARIN SODIUM 18 UNITS/KG/HR: 10000 INJECTION, SOLUTION INTRAVENOUS at 09:45

## 2024-12-24 NOTE — ASSESSMENT & PLAN NOTE
Lab Results   Component Value Date    HGBA1C 7.3 (H) 09/04/2024     -takes glimepiride  -does not take jardiance

## 2024-12-24 NOTE — H&P
H&P - Surgery-General   Name: Jay Roach Jr. 79 y.o. male I MRN: [id number]  Unit/Bed#: ED 23 I Date of Admission: 12/24/2024   Date of Service: 12/24/2024 I Hospital Day: 0     Assessment & Plan  Mesenteric ischemia (HCC)  Patient is an 79 year old M who presented to [facility] ED with 1 week of abdominal pain. CTA abdomen/pelvis found to have fat stranding and portal venous gas highly suspicious for mesenteric ischemia. Patient transferred to [facility] for further management under general surgery and vascular surgery.     - Admit to General Surgery Service  - Step Down 2 level of care  - NPO  - OR for diagnostic lap, possible exploratory laparotomy, possible bowel resection in conjunction with vascular surgery. Consent was obtained at bedside. Risks and benefits were discussed.   - Continue Heparin gtt   - Hold plavix  - IVF  - Cardiology consult   - Serial abdominal exams  - Appreciate vascular surgery recommendations      History of Present Illness   Jay Roach Jr. is a 79 y.o. male with PMH of constipation, HTN, diabetes, smoking, PAD s/p LLE angioplasty common femoral endarterectomy and stenting, s/p TAVR (on plavix last dose yesterday), CKD, lung cancer s/p radiation therapy, and appendectomy with right hemicolectomy who presented to [facility] ED on 12/24 with 1 week of abdominal pain. Patient states the pain is worse on his right side. The pain has gotten progressively worse which is why he came to the ED. He has associated nausea and 2 episodes of non bloody emesis. Last BM was this morning, nonbloody. States he has had black stools in the past but none recently. Admits to history of 3 GI bleeds in the past, most recent one in January of 2024. Patient underwent endoscopy with clip placement. Patient follows with GI and take PPI for his symptoms. Last colonoscopy was 2021.     At [facility] patient underwent a CTA abdomen/pelvis which was significant for fat  stranding and portal venous gas highly suspicious for mesenteric ischemia. WBC 11.1, lactate 1, hemoglobin 12.2. Patient transferred to [facility] for further intervention with general surgery and vascular surgery.    Review of Systems   Constitutional:  Negative for chills and fever.   Respiratory:  Negative for chest tightness and shortness of breath.    Cardiovascular:  Negative for chest pain.   Gastrointestinal:  Positive for abdominal pain, nausea and vomiting. Negative for anal bleeding, blood in stool and diarrhea.   Genitourinary:  Negative for difficulty urinating and dysuria.   Psychiatric/Behavioral:  Negative for agitation and confusion.      I have reviewed the patient's PMH, PSH, Social History, Family History, Meds, and Allergies    Objective :  Temp:  [97.5 °F (36.4 °C)-97.8 °F (36.6 °C)] 97.8 °F (36.6 °C)  HR:  [74-87] 80  BP: (151-220)/(67-88) 176/76  Resp:  [20] 20  SpO2:  [94 %-100 %] 94 %  O2 Device: None (Room air)      Physical Exam  Constitutional:       General: He is not in acute distress.  HENT:      Head: Normocephalic and atraumatic.   Cardiovascular:      Rate and Rhythm: Normal rate.   Pulmonary:      Effort: Pulmonary effort is normal.   Abdominal:      General: Abdomen is flat. There is no distension.      Palpations: Abdomen is soft.      Tenderness: There is abdominal tenderness. There is no guarding.      Comments: Diffuse abdominal tenderness, worse in right upper and lower quadrants   Skin:     General: Skin is warm and dry.      Capillary Refill: Capillary refill takes less than 2 seconds.   Neurological:      Mental Status: He is alert.         Lab Results: I have reviewed the following results:  Recent Labs     12/24/24  0128 12/24/24  0142 12/24/24  0603   WBC 11.11*  --   --    HGB 12.2  --   --    HCT 36.6  --   --      --   --    SODIUM 140  --   --    K 3.6  --   --      --   --    CO2 23  --   --    BUN 41*  --   --    CREATININE 2.08*  --   --    GLUC  200*  --   --    AST 13  --   --    ALT 7  --   --    ALB 4.3  --   --    TBILI 0.42  --   --    ALKPHOS 80  --   --    PTT  --   --  28   INR  --   --  1.02   LACTICACID  --  1.0  --        Imaging Results Review: I reviewed radiology reports from this admission including: CT abdomen/pelvis.    VTE Pharmacologic Prophylaxis: Heparin  VTE Mechanical Prophylaxis: sequential compression device    Renee Wooten PA-C

## 2024-12-24 NOTE — CONSULTS
Consultation - Trauma   Name: Jay Roach Jr. 79 y.o. 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    Assessment & Plan   Neuro:   Diagnosis: nicotine abuse  Plan: NRT  Encourage cessation  Diagnosis: at risk for ICU delirium  Plan: regulate sleep wake cycle  Delirium precautions   Diagnosis: post-op analgesia  Plan: IV tylenol, iv prn dilaudid, dilaudid PCA  Narcan as needed    CV:   Diagnosis: HTN, CAD, HLD, PAD  Home meds: amlodipine 10mg daily, hydralazine 25mg BID, metoprolol XL 12.5mg, lisinopril 5mg daily  Plan: hold home plavix in the immediate post-op period  NPO - hold home meds, continue IV metoprolol  Prn labetolol 10mg iv for SBP >160  Consider adding hydralazine prn    Pulm:  Diagnosis: history of SCC of left lung  Completed SBRT in May 2024  Plan: maintain SpO2 >88%  Supplemental O2 as needed    GI:   Diagnosis: SMA stenosis  CTA initially concerning for pneumatosis with portal venous gas  12/24 s/p ex lap, ADA, open SMA stent  Plan: NPO with NGT to LCWS  Monitor incision  Likely start plavix tomorrow 12/25    :   Diagnosis: CKD  Plan: trend Cr, monitor I&O's  Trend UOP    F/E/N:   F: isolyte 125cc/hr  E: monitor and replace per protocol  N: NPO, NGT    Heme/Onc:   Diagnosis: ABLA, coagulopathy  Plan: transfuse for Hg <7.0  Follow up repeat TEG at 9p after FFP    Endo:   Diagnosis: DM2  Plan: SSI as needed  Goal -180    ID:   No active issues  Monitor fever curve, trend off abx    MSK/Skin:   Diagnosis: surgical incision care  Plan: monitor s/sx of bleeding  Care per  vascular/red surgery    Disposition: Stepdown Level 1    History of Present Illness   Jay Roach Jr. is a 79 y.o. male with pmhx of HTN, HLD, CAD, PAD, SCC of lung s/p SBRT who presented with abdominal pain, nausea, and vomiting. States the pain has been ongoing for 1 week. He underwent CT scanning that showed portal venous gas with pneumatosis and heavily calcified SMA/celiac trunk concerning for mesenteric ischemia. He was transferred to [facility] for vascular intervention. He presents to the ICU post op from ex lap with SMA stenting and ADA. Per report, there was no ischemia noted intra-op, no bowel resected.     History obtained from chart review.  Review of Systems: See HPI for Review of Systems    Historical Information   Past Medical History:  09/10/2021: Atherosclerosis of autologous vein bypass graft(s) of   other extremity with ulceration (HCC)  02/24/2023: Atherosclerosis of native artery of right lower extremity   with ulceration of midfoot (HCC)  No date: Basal cell carcinoma      Comment:  right cheek  No date: CAD (coronary artery disease)  No date: Carotid stenosis, asymptomatic, bilateral  No date: Chronic kidney disease  No date: Colon polyp  04/07/2023: Dependence on respirator (ventilator) status ([facility])  No date: Diabetes ([facility])      Comment:  type 2, non-insulin dependent  No date: Diabetes mellitus (HCC)  No date: GERD (gastroesophageal reflux disease)  No date: History of nephrolithiasis  No date: Hyperlipidemia  No date: Hypertension  11/30/2022: Left foot pain  No date: Lung cancer (HCC)  No date: PAD (peripheral artery disease) ([facility])  No date: Severe aortic stenosis  11/22/2022: Squamous cell skin cancer      Comment:  left superior helix Past Surgical History:  No date: APPENDECTOMY  05/05/2022: CARDIAC CATHETERIZATION; N/A      Comment:  Procedure: CARDIAC RHC/LHC;  Surgeon: Arvin Sanchez DO;  [facility];  Service:                 Cardiology  05/05/2022: CARDIAC CATHETERIZATION; N/A      Comment:  Procedure: Cardiac Coronary Angiogram;  Surgeon:                Arvin Sanchez DO;  Location: [facility] CARD[facility];               Service: Cardiology  05/24/2022: CARDIAC CATHETERIZATION; N/A      Comment:  Procedure: Cardiac pci;  Surgeon: Damien Jeter MD;                 Location: [facility]ARD[facility];  Service: Cardiology  06/14/2022: CARDIAC CATHETERIZATION; N/A      Comment:  Procedure: CARDIAC TAVR;  Surgeon: Nahum Vaz MD;                 Location: [facility] MAIN OR;  Service: Cardiology  No date: COLECTOMY  2013: COLONOSCOPY  2013: CORONARY ARTERY BYPASS GRAFT      Comment:  X 2  No date: FEMORAL ARTERY - POPLITEAL ARTERY BYPASS GRAFT  No date: HEMORRHOID SURGERY  11/19/2018: IR AORTAGRAM WITH RUN-OFF  03/02/2023: IR AORTAGRAM WITH RUN-OFF  4/17/2024: IR BIOPSY LUNG  03/23/2023: IR LOWER EXTREMITY ANGIOGRAM  12/24/2024: IR MESENTERIC/VISCERAL ANGIOGRAM  01/11/2023: MOHS SURGERY; Left      Comment:  SCC left superior helix-Dr Guy  08/12/2016: MS SLCTV CATHJ 3RD+ ORD SLCTV ABDL PEL/LXTR BRNCH; Left      Comment:  Procedure: LEFT FEMORAL ARTERIOGRAM; BALLOON                ANGIOPLASTY; SFA  AND FEMORAL AT VEIN GRAFT;  Surgeon:                Nicholas Urena MD;  Location: [facility] MAIN [facility];  Service:                Vascular  03/23/2023: MS TEAEC W/WO PATCH GRAFT COMMON FEMORAL; Right      Comment:  Procedure: Common femoral endarterectomy&antegrade                intervention of SFA, popliteal artery w/ shockwave;                 Surgeon: Eagle Higuera MD;  Location: [facility];                Service: Vascular  06/14/2022: MS TRANSCATHETER TRANSAPICAL REPLACEMT AORTIC VALVE; N/A      Comment:  Procedure: REPLACEMENT AORTIC VALVE TRANSCATHETER (TAVR)               TRANSAPICAL 29MM IRVIN KYLER S3 ULTRA VALVE(ACCESS ON                LEFT) ELANA;  Surgeon: Amarjit Gordon DO;  Location: [facility]               MAIN OR;  Service: Cardiac  Surgery  No date: SKIN CANCER EXCISION  No date: TONSILLECTOMY AND ADENOIDECTOMY  No date: UPPER GASTROINTESTINAL ENDOSCOPY   Current Outpatient Medications   Medication Instructions    allopurinol (ZYLOPRIM) 300 mg, Oral, Daily    amLODIPine (NORVASC) 10 mg tablet TAKE 1 TABLET DAILY    atorvastatin (LIPITOR) 80 mg, Oral, Daily at bedtime    bacitracin topical ointment 500 units/g topical ointment 1 large application, Topical, 2 times daily    calcitriol (ROCALTROL) 0.25 mcg, Oral, Daily    clopidogrel (PLAVIX) 75 mg, Oral, Daily    Empagliflozin (JARDIANCE) 10 mg, Oral, Every morning    ferrous sulfate 325 mg, Every other day    glimepiride (AMARYL) 1 mg, Oral, Daily with breakfast    hydrALAZINE (APRESOLINE) 25 mg, Oral, 2 times daily    lisinopril (ZESTRIL) 5 mg, Oral, Daily    metoprolol succinate (TOPROL-XL) 12.5 mg, Oral, Daily    pancrelipase, Lip-Prot-Amyl, (Creon) 24,000 units 3 times daily with meals    pantoprazole (PROTONIX) 40 mg, Oral, 2 times daily    No Known Allergies   Social History     Tobacco Use    Smoking status: Every Day     Current packs/day: 1.00     Average packs/day: 0.6 packs/day for 100.0 years (62.5 ttl pk-yrs)     Types: Cigarettes     Passive exposure: Current    Smokeless tobacco: Never    Tobacco comments:     4 cigarettes per day   Vaping Use    Vaping status: Never Used   Substance Use Topics    Alcohol use: Not Currently     Comment: rare    Drug use: No    Family History   Problem Relation Age of Onset    Heart attack Father     Other Sister         bypass and vlave replacement    Stroke Paternal Uncle     Arrhythmia Neg Hx     Asthma Neg Hx     Clotting disorder Neg Hx     Fainting Neg Hx     Anuerysm Neg Hx     Hypertension Neg Hx         unsure     Hyperlipidemia Neg Hx     Heart failure Neg Hx           Objective :                   Vitals I/O      Most Recent Min/Max in 24hrs   Temp 98.9 °F (37.2 °C) Temp  Min: 97.5 °F (36.4 °C)  Max: 99 °F (37.2 °C)   Pulse 82 Pulse   Min: 74  Max: 87   Resp 21 Resp  Min: 16  Max: 36   /71 BP  Min: 113/63  Max: 220/88   O2 Sat 98 % SpO2  Min: 89 %  Max: 100 %      Intake/Output Summary (Last 24 hours) at 12/24/2024 1658  Last data filed at 12/24/2024 1612  Gross per 24 hour   Intake 3600 ml   Output 725 ml   Net 2875 ml       Diet NPO    Invasive Monitoring   Arterial Line  Deyanira /35  Arterial Line BP  Min: 123/35  Max: 152/49   MAP 66 mmHg  Arterial Line MAP (mmHg)  Min: 66 mmHg  Max: 86 mmHg           Physical Exam   Physical Exam  Vitals and nursing note reviewed.   Eyes:      General: No scleral icterus.     Extraocular Movements: Extraocular movements intact.      Conjunctiva/sclera: Conjunctivae normal.   Skin:     General: Skin is warm.      Capillary Refill: Capillary refill takes less than 2 seconds.   HENT:      Head: Normocephalic and atraumatic.   Cardiovascular:      Rate and Rhythm: Normal rate and regular rhythm.      Pulses: Normal pulses.      Heart sounds: Normal heart sounds.   Musculoskeletal:      Right lower leg: No edema.      Left lower leg: No edema.   Abdominal: General: There is no distension.      Palpations: Abdomen is soft.      Tenderness: There is abdominal tenderness. There is no guarding.      Comments: Midline incision with bloody strikethrough noted. Generalized ttp   Constitutional:       General: He is not in acute distress.     Appearance: He is well-developed and well-nourished. He is not toxic-appearing.   Pulmonary:      Effort: Pulmonary effort is normal. No accessory muscle usage, respiratory distress or accessory muscle usage.      Breath sounds: No wheezing or rhonchi.   Neurological:      General: No focal deficit present.      Mental Status: Mental status is at baseline.      Cranial Nerves: No facial asymmetry.      Sensory: No sensory deficit.   Genitourinary/Anorectal:  Archuleta present.        Diagnostic Studies        Lab Results: I have reviewed the following results:      Medications:  Scheduled PRN   acetaminophen, 1,000 mg, Q6H TRENT  heparin (porcine) 2,000 Units, papaverine 60 mg in multi-electrolyte (PLASMALYTE-A/ISOLYTE-S PH 7.4) 500 mL irrigation, , Once  heparin (porcine), 5,000 Units, Q8H TRENT  insulin lispro, 1-5 Units, Q6H TRENT  metoprolol, 2.5 mg, Q8H  nicotine, 1 patch, Daily  pantoprazole, 40 mg, Q12H TRENT      fentaNYL, 25 mcg, Q5 Min PRN  HYDROmorphone, 0.5 mg, Q3H PRN  HYDROmorphone, 0.2 mg, Q5 Min PRN  labetalol, 10 mg, Q4H PRN  ondansetron, 4 mg, Once PRN  ondansetron, 4 mg, Q4H PRN       Continuous    HYDROmorphone,   multi-electrolyte, 125 mL/hr, Last Rate: 125 mL/hr (12/24/24 1641)         Labs:   CBC    Recent Labs     12/24/24  0128 12/24/24  1408 12/24/24  1527   WBC 11.11*  --   --    HGB 12.2 9.2* 8.5*   HCT 36.6 27* 25*     --   --      BMP    Recent Labs     12/24/24  0128 12/24/24  1408 12/24/24  1527   SODIUM 140  --   --    K 3.6  --   --      --   --    CO2 23 24 24   AGAP 12  --   --    BUN 41*  --   --    CREATININE 2.08*  --   --    CALCIUM 9.9  --   --        Coags    Recent Labs     12/24/24  0603   INR 1.02   PTT 28        Additional Electrolytes  Recent Labs     12/24/24  1527 12/24/24  1636   CAIONIZED 1.16 1.08*          Blood Gas    Recent Labs     12/24/24  1637   PHART 7.295*   UTG7DRC 42.1   PO2ART 68.8*   UPL8CYQ 20.0*   BEART -6.1   SOURCE Line, Arterial     Recent Labs     12/24/24  1637   SOURCE Line, Arterial    LFTs  Recent Labs     12/24/24  0128   ALT 7   AST 13   ALKPHOS 80   ALB 4.3   TBILI 0.42       Infectious  No recent results  Glucose  Recent Labs     12/24/24  0128   GLUC 200*

## 2024-12-24 NOTE — ASSESSMENT & PLAN NOTE
Patient is an 79 year old M who presented to [facility] ED with 1 week of abdominal pain. CTA abdomen/pelvis found to have fat stranding and portal venous gas highly suspicious for mesenteric ischemia. Patient transferred to [facility] for further management under general surgery and vascular surgery.     - Admit to General Surgery Service  - Step Down 2 level of care  - NPO  - OR for diagnostic lap, possible exploratory laparotomy, possible bowel resection in conjunction with vascular surgery. Consent was obtained at bedside. Risks and benefits were discussed.   - Continue Heparin gtt   - Hold plavix  - IVF  - Cardiology consult   - Serial abdominal exams  - Appreciate vascular surgery recommendations

## 2024-12-24 NOTE — ED ATTENDING ATTESTATION
12/24/2024  I, Holland Frazier MD, saw and evaluated the patient. I have discussed the patient with the resident/non-physician practitioner and agree with the resident's/non-physician practitioner's findings, Plan of Care, and MDM as documented in the resident's/non-physician practitioner's note, except where noted. All available labs and Radiology studies were reviewed.  I was present for key portions of any procedure(s) performed by the resident/non-physician practitioner and I was immediately available to provide assistance.       At this point I agree with the current assessment done in the Emergency Department.  I have conducted an independent evaluation of this patient a history and physical is as follows: Diffuse abdominal pain, worse on the right side, present for about a week, scheduled for outpatient CT scan on Friday.  Pain worsened this evening prompting emergency evaluation.    Pain management, CT with concern for mesenteric ischemia.  Heparin per VTE protocol per vascular surgical team.  CTA per vascular surgical team, transfer to [facility] for surgical management.     Results Reviewed       Procedure Component Value Units Date/Time    APTT [[id number]] Collected: 12/24/24 0603    Lab Status: In process Specimen: Blood from Arm, Right Updated: 12/24/24 0611    Protime-INR [[id number]] Collected: 12/24/24 0603    Lab Status: In process Specimen: Blood from Arm, Right Updated: 12/24/24 0611    Lactic acid, plasma (w/reflex if result > 2.0) [[id number]]  (Normal) Collected: 12/24/24 0142    Lab Status: Final result Specimen: Blood from Arm, Right Updated: 12/24/24 0209     LACTIC ACID 1.0 mmol/L     Narrative:      Result may be elevated if tourniquet was used during collection.    Urine Microscopic [[id number]]  (Normal) Collected: 12/24/24 0140    Lab Status: Final result Specimen: Urine, Clean Catch Updated: 12/24/24 0159     RBC, UA None Seen /hpf      WBC, UA None Seen /hpf       Epithelial Cells None Seen /hpf      Bacteria, UA None Seen /hpf     UA w Reflex to Microscopic w Reflex to Culture [[id number]]  (Abnormal) Collected: 12/24/24 0140    Lab Status: Final result Specimen: Urine, Clean Catch Updated: 12/24/24 0158     Color, UA Light Yellow     Clarity, UA Clear     Specific Gravity, UA 1.011     pH, UA 5.5     Leukocytes, UA Negative     Nitrite, UA Negative     Protein, UA 70 (1+) mg/dl      Glucose, UA Negative mg/dl      Ketones, UA Negative mg/dl      Urobilinogen, UA <2.0 mg/dl      Bilirubin, UA Negative     Occult Blood, UA Negative    Comprehensive metabolic panel [[id number]]  (Abnormal) Collected: 12/24/24 0128    Lab Status: Final result Specimen: Blood from Arm, Right Updated: 12/24/24 0154     Sodium 140 mmol/L      Potassium 3.6 mmol/L      Chloride 105 mmol/L      CO2 23 mmol/L      ANION GAP 12 mmol/L      BUN 41 mg/dL      Creatinine 2.08 mg/dL      Glucose 200 mg/dL      Calcium 9.9 mg/dL      AST 13 U/L      ALT 7 U/L      Alkaline Phosphatase 80 U/L      Total Protein 7.9 g/dL      Albumin 4.3 g/dL      Total Bilirubin 0.42 mg/dL      eGFR 28 ml/min/1.73sq m     Narrative:      National [employer] guidelines for Chronic Kidney Disease (CKD):     Stage 1 with normal or high GFR (GFR > 90 mL/min/1.73 square meters)    Stage 2 Mild CKD (GFR = 60-89 mL/min/1.73 square meters)    Stage 3A Moderate CKD (GFR = 45-59 mL/min/1.73 square meters)    Stage 3B Moderate CKD (GFR = 30-44 mL/min/1.73 square meters)    Stage 4 Severe CKD (GFR = 15-29 mL/min/1.73 square meters)    Stage 5 End Stage CKD (GFR <15 mL/min/1.73 square meters)  Note: GFR calculation is accurate only with a steady state creatinine    Lipase [[id number]]  (Abnormal) Collected: 12/24/24 0128    Lab Status: Final result Specimen: Blood from Arm, Right Updated: 12/24/24 0154     Lipase 7 u/L     CBC and differential [[id number]]  (Abnormal) Collected: 12/24/24 0128    Lab Status: Final result  Specimen: Blood from Arm, Right Updated: 12/24/24 0137     WBC 11.11 Thousand/uL      RBC 3.89 Million/uL      Hemoglobin 12.2 g/dL      Hematocrit 36.6 %      MCV 94 fL      MCH 31.4 pg      MCHC 33.3 g/dL      RDW 14.9 %      MPV 11.0 fL      Platelets 186 Thousands/uL      nRBC 0 /100 WBCs      Segmented % 86 %      Immature Grans % 1 %      Lymphocytes % 8 %      Monocytes % 3 %      Eosinophils Relative 2 %      Basophils Relative 0 %      Absolute Neutrophils 9.57 Thousands/µL      Absolute Immature Grans 0.06 Thousand/uL      Absolute Lymphocytes 0.86 Thousands/µL      Absolute Monocytes 0.38 Thousand/µL      Eosinophils Absolute 0.20 Thousand/µL      Basophils Absolute 0.04 Thousands/µL           CT abdomen pelvis wo contrast   Final Result by Magan Ann MD (12/24 0318)      Findings as above highly concerning for mesenteric and/or bowel ischemia.   No definite bowel wall pneumatosis or free intraperitoneal air identified.   Urgent surgical/critical care consultation advised.      Aforementioned findings were discussed with Dr. Knowles at 3:14 a.m. on 12/24/2024.      Workstation performed: [id number]         CTA abdomen pelvis w wo contrast    (Results Pending)         ED Course  ED Course as of 12/24/24 0616   Tue Dec 24, 2024   0516 Vascular surgery team recommended CTA abdomen/pelvis despite known CKD given high likelihood of mesenteric ischemia.  Will hydrate patient.  Will start heparin per VTE protocol and pursue transfer to [facility] per surgical team.         Critical Care Time  Procedures

## 2024-12-24 NOTE — ANESTHESIA PROCEDURE NOTES
Arterial Line Insertion    Performed by: Jorge A Stiles CRNA  Authorized by: Jorge A Stiles CRNA  Consent: Verbal consent obtained. Written consent obtained.  Risks and benefits: risks, benefits and alternatives were discussed  Consent given by: patient  Patient understanding: patient states understanding of the procedure being performed  Patient consent: the patient's understanding of the procedure matches consent given  Procedure consent: procedure consent matches procedure scheduled  Required items: required blood products, implants, devices, and special equipment available  Patient identity confirmed: arm band and hospital-assigned identification number  Preparation: Patient was prepped and draped in the usual sterile fashion.  Indications: hemodynamic monitoring  Orientation:  Right  Location: radial artery  Sedation:  Patient sedated: GA.    Procedure Details:  Needle gauge: 20  Seldinger technique: Seldinger technique used  Number of attempts: 1    Post-procedure:  Post-procedure: dressing applied  Waveform: good waveform  Patient tolerance: Patient tolerated the procedure well with no immediate complications  Comments: U/S guided

## 2024-12-24 NOTE — ED PROVIDER NOTES
Time reflects when diagnosis was documented in both MDM as applicable and the Disposition within this note       Time User Action Codes Description Comment    12/24/2024  3:36 AM Alvin Knowles Add [K55.9] Mesenteric ischemia (HCC)     12/24/2024  3:36 AM Alvin Knowles Add [R10.9] Abdominal pain     12/24/2024  3:36 AM Alvin Knowles Add [R11.2] Nausea and vomiting           ED Disposition       ED Disposition   Transfer to Another Facility-In Network    Condition   --    Date/Time   Tue Dec 24, 2024  5:32 AM    Comment   Jay Roach Jr. should be transferred out to [facility], accepted by Dr. Evans.               Assessment & Plan       Medical Decision Making  Patient seen and examined.  Abdomen is diffusely tender, patient points to middle of abdomen around bellybutton as most severe location of pain.  Remainder of exam is within normal limits.  Differential includes but is not limited to diverticulitis, small bowel obstruction, large bowel obstruction, cholecystitis, pancreatitis, mesenteric ischemia.  Appropriate labs imaging ordered.  Pain medication and Zofran ordered.    Labs notable for elevated BUN and creatinine, these are lower than the patient's baseline and he has known stage IV CKD due to diabetes.  White count 11.11.  Lactate is normal at 1.0.    MD received phone callfrom radiology Dr. Ann:pleural venous gas, mesenteric vascular gas in RUQ, bowel was thickening at anastomosis in RUQ , fat stranding in RUQ around small bowel.  General surgery consulted, case discussed with Dr. Lester who will come see the patient.  Results discussed with the patient who expresses understanding.    Patient seen by general surgery who also consulted vascular surgery.  Vascular surgery Dr. Zafar recommends a formal CTA to identify ischemia given that the initial scan was Noncon.  This recommendation was given with consideration of GFR. They also recommend transfer to [place] with consultation at that campus and  heparin gtt VTE protocol.  General surgery Dr. Lester and Dr. Pathak recommend transfer to [facility] to the red service.  Case discussed with general surgery Dr. Evans at [facility] accepted the patient for transfer. The patient remains agreeable to transfer, EMTALA documents signed.    Amount and/or Complexity of Data Reviewed  Labs: ordered. Decision-making details documented in ED Course.  Radiology: ordered.    Risk  Prescription drug management.        ED Course as of 12/24/24 0709   Tue Dec 24, 2024   0307 Creatinine(!): 2.08   0307 BUN(!): 41  Patient with known stage IV CKD, BUN and creatinine are below patient's baseline.   0308 WBC(!): 11.11       Medications   heparin (porcine) 25,000 units in 0.45% NaCl 250 mL infusion (premix) (18 Units/kg/hr × 80 kg (Order-Specific) Intravenous New Bag 12/24/24 0609)   heparin (porcine) injection 6,400 Units (has no administration in time range)   heparin (porcine) injection 3,200 Units (has no administration in time range)   ondansetron (ZOFRAN) injection 4 mg (4 mg Intravenous Given 12/24/24 0142)   HYDROmorphone (DILAUDID) injection 0.5 mg (0.5 mg Intravenous Given 12/24/24 0143)   HYDROmorphone (DILAUDID) injection 0.5 mg (0.5 mg Intravenous Given 12/24/24 0328)   multi-electrolyte (ISOLYTE-S PH 7.4) bolus 1,000 mL (0 mL Intravenous Stopped 12/24/24 0659)   heparin (porcine) injection 6,400 Units (6,400 Units Intravenous Given 12/24/24 0608)   iohexol (OMNIPAQUE) 350 MG/ML injection (MULTI-DOSE) 85 mL (85 mL Intravenous Given 12/24/24 0548)       ED Risk Strat Scores                          SBIRT 22yo+      Flowsheet Row Most Recent Value   Initial Alcohol Screen: US AUDIT-C     1. How often do you have a drink containing alcohol? 0 Filed at: 12/24/2024 0321   2. How many drinks containing alcohol do you have on a typical day you are drinking?  0 Filed at: 12/24/2024 0321   3a. Male UNDER 65: How often do you have five or more drinks on one occasion? 0 Filed  at: 12/24/2024 0321   3b. FEMALE Any Age, or MALE 65+: How often do you have 4 or more drinks on one occassion? 0 Filed at: 12/24/2024 0321   Audit-C Score 0 Filed at: 12/24/2024 0321   MERRILL: How many times in the past year have you...    Used an illegal drug or used a prescription medication for non-medical reasons? Never Filed at: 12/24/2024 0321                            History of Present Illness       Chief Complaint   Patient presents with    Abdominal Pain     Lower abdominal pain, nausea, vomiting after dinner, cold sweats x 1 week. Pt states its worse tonight. Pt has CT scan scheduled for abdomen, unable to wait. Hx of GI bleeds.       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 ([facility]) 09/10/2021    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 ([facility]) 02/24/2023    Basal cell carcinoma     right cheek    CAD (coronary artery disease)     Carotid stenosis, asymptomatic, bilateral     Chronic kidney disease     Colon polyp     Dependence on respirator (ventilator) status ([facility]) 04/07/2023    Diabetes ([facility])     type 2, non-insulin dependent    Diabetes mellitus ([facility])     GERD (gastroesophageal reflux disease)     History of nephrolithiasis     Hyperlipidemia     Hypertension     Left foot pain 11/30/2022    Lung cancer ([facility])     PAD (peripheral artery disease) ([facility])     Severe aortic stenosis     Squamous cell skin cancer 11/22/2022    left superior helix      Past Surgical History:   Procedure Laterality Date    APPENDECTOMY      CARDIAC CATHETERIZATION N/A 05/05/2022    Procedure: CARDIAC RHC/LHC;  Surgeon: Arvin Sanchez DO;  Location: [facility];  Service: Cardiology    CARDIAC CATHETERIZATION N/A 05/05/2022    Procedure: Cardiac Coronary Angiogram;  Surgeon: Arvin Sanchez DO;  [facility];  Service: Cardiology    CARDIAC CATHETERIZATION N/A  05/24/2022    Procedure: Cardiac pci;  Surgeon: Damien Jeter MD;  [facility];  Service: Cardiology    CARDIAC CATHETERIZATION N/A 06/14/2022    Procedure: CARDIAC TAVR;  Surgeon: Nahum Vaz MD;  Location: [facility];  Service: Cardiology    COLECTOMY      COLONOSCOPY  2013    CORONARY ARTERY BYPASS GRAFT  2013    X 2    FEMORAL ARTERY - POPLITEAL ARTERY BYPASS GRAFT      HEMORRHOID SURGERY      IR AORTAGRAM WITH RUN-OFF  11/19/2018    IR AORTAGRAM WITH RUN-OFF  03/02/2023    IR BIOPSY LUNG  4/17/2024    IR LOWER EXTREMITY ANGIOGRAM  03/23/2023    MOHS SURGERY Left 01/11/2023    SCC left superior helix-Dr Guy    NE SLCTV CATHJ 3RD+ ORD SLCTV ABDL PEL/LXTR BRNCH Left 08/12/2016    Procedure: LEFT FEMORAL ARTERIOGRAM; BALLOON ANGIOPLASTY; SFA  AND FEMORAL AT VEIN GRAFT;  Surgeon: Nicholas Urena MD;  Location: [facility];  Service: Vascular    NE TEAEC W/WO PATCH GRAFT COMMON FEMORAL Right 03/23/2023    Procedure: Common femoral endarterectomy&antegrade intervention of SFA, popliteal artery w/ shockwave;  Surgeon: Eagle Higuera MD;  Location: [facility];  Service: Vascular    NE TRANSCATHETER TRANSAPICAL REPLACEMT AORTIC VALVE N/A 06/14/2022    Procedure: REPLACEMENT AORTIC VALVE TRANSCATHETER (TAVR) TRANSAPICAL 29MM IRVIN KYLER S3 ULTRA VALVE(ACCESS ON LEFT) ELANA;  Surgeon: Amarjit Gordon DO;  Location: [facility];  Service: Cardiac Surgery    SKIN CANCER EXCISION      TONSILLECTOMY AND ADENOIDECTOMY      UPPER GASTROINTESTINAL ENDOSCOPY        Family History   Problem Relation Age of Onset    Heart attack Father     Other Sister         bypass and vlave replacement    Stroke Paternal Uncle     Arrhythmia Neg Hx     Asthma Neg Hx     Clotting disorder Neg Hx     Fainting Neg Hx     Anuerysm Neg Hx     Hypertension Neg Hx         unsure     Hyperlipidemia Neg Hx     Heart failure Neg Hx       Social History     Tobacco Use    Smoking status: Every Day     Current packs/day: 1.00      Average packs/day: 0.6 packs/day for 100.0 years (62.5 ttl pk-yrs)     Types: Cigarettes     Passive exposure: Current    Smokeless tobacco: Never    Tobacco comments:     4 cigarettes per day   Vaping Use    Vaping status: Never Used   Substance Use Topics    Alcohol use: Not Currently     Comment: rare    Drug use: No      E-Cigarette/Vaping    E-Cigarette Use Never User       E-Cigarette/Vaping Substances    Nicotine No     THC No     CBD No     Flavoring No     Other No     Unknown No       I have reviewed and agree with the history as documented.     79-year-old man presenting with lower abdominal pain x 1 week.  He states the pain started 1 week ago and he saw his primary care doctor.  In that time the pain is lessened until tonight.  Primary care doctor had labs done and scheduled a CT abdomen pelvis that has not been done yet.  Tonight the patient ate dinner around 7 PM and then had onset of intense lower abdominal pain after that.  He has stage IV CKD due to type 2 diabetes and pancreatic insufficiency for which she takes Creon.  He took his Creon as prescribed tonight.  He is unable to say if the pain came on suddenly or gradually increased.  He describes the pain as aching across the lower abdomen.  Pain does not radiate anywhere.  He also notes 2 episodes of vomiting, nonbloody nonbilious, when the pain got worse tonight.  Patient had his appendix removed, he had a ruptured appendicitis and states they took a portion of his colon at the same time.  He still has his gallbladder.  He denies chest pain, shortness of breath, lightheadedness, dizziness, fevers, chills, weight loss, constipation, diarrhea, hematuria, dysuria.  He notes his last bowel movement was tonight, he states he had to strain and it was a small amount.  Last normal bowel movement was this morning.      History provided by:  Patient  Abdominal Pain  Associated symptoms: nausea and vomiting    Associated symptoms: no chest pain, no  chills, no constipation, no cough, no diarrhea, no dysuria, no fatigue, no fever, no hematuria, no shortness of breath and no sore throat        Review of Systems   Constitutional:  Negative for chills, fatigue and fever.   HENT:  Negative for congestion, ear pain, postnasal drip, rhinorrhea, sinus pain and sore throat.    Eyes:  Negative for pain and visual disturbance.   Respiratory:  Negative for cough, chest tightness and shortness of breath.    Cardiovascular:  Negative for chest pain and palpitations.   Gastrointestinal:  Positive for abdominal pain, nausea and vomiting. Negative for constipation and diarrhea.   Genitourinary:  Negative for difficulty urinating, dysuria and hematuria.   Musculoskeletal:  Negative for back pain.   Skin:  Negative for color change and rash.   Neurological:  Negative for dizziness, seizures, syncope, weakness, light-headedness, numbness and headaches.   All other systems reviewed and are negative.          Objective       ED Triage Vitals   Temperature Pulse Blood Pressure Respirations SpO2 Patient Position - Orthostatic VS   12/24/24 0113 12/24/24 0118 12/24/24 0118 12/24/24 0118 12/24/24 0118 12/24/24 0325   97.5 °F (36.4 °C) 82 (!) 204/85 20 100 % Sitting      Temp Source Heart Rate Source BP Location FiO2 (%) Pain Score    12/24/24 0113 12/24/24 0118 12/24/24 0325 -- 12/24/24 0325    Oral Monitor Left arm  10 - Worst Possible Pain      Vitals      Date and Time Temp Pulse SpO2 Resp BP Pain Score FACES Pain Rating User   12/24/24 0615 -- 81 -- -- 187/79 -- --    12/24/24 0530 -- 81 -- -- 184/77 -- --    12/24/24 0500 -- 83 -- -- 204/84 -- --    12/24/24 0430 -- 83 -- -- 180/74 -- --    12/24/24 0345 -- 75 95 % -- -- 8 --    12/24/24 0330 -- 77 96 % -- 190/81 -- --    12/24/24 0325 -- 74 -- 20 211/83 10 - Worst Possible Pain --    12/24/24 0143 -- -- -- -- 220/88 -- --    12/24/24 0118 -- 82 100 % 20 204/85 -- --    12/24/24 0113 97.5 °F (36.4 °C) -- -- -- --  -- --             Physical Exam  Constitutional:       General: He is not in acute distress.     Appearance: He is not diaphoretic.   HENT:      Head: Normocephalic and atraumatic.      Nose: No congestion or rhinorrhea.      Mouth/Throat:      Mouth: Mucous membranes are moist.      Pharynx: No oropharyngeal exudate.   Eyes:      General: No scleral icterus.  Cardiovascular:      Rate and Rhythm: Normal rate and regular rhythm.      Heart sounds: Normal heart sounds. No murmur heard.     No friction rub. No gallop.   Pulmonary:      Effort: No respiratory distress.      Breath sounds: Normal breath sounds. No wheezing, rhonchi or rales.   Abdominal:      General: Abdomen is flat. There is no distension.      Palpations: Abdomen is soft.      Tenderness: There is generalized abdominal tenderness and tenderness in the periumbilical area. There is guarding (Voluntary). There is no right CVA tenderness, left CVA tenderness or rebound. Negative signs include Hager's sign, Rovsing's sign and McBurney's sign.      Hernia: No hernia is present.   Lymphadenopathy:      Cervical: No cervical adenopathy.   Skin:     General: Skin is warm and dry.      Capillary Refill: Capillary refill takes less than 2 seconds.   Neurological:      General: No focal deficit present.      Mental Status: He is alert and oriented to person, place, and time.         Results Reviewed       Procedure Component Value Units Date/Time    APTT [[id number]]  (Normal) Collected: 12/24/24 0603    Lab Status: Final result Specimen: Blood from Arm, Right Updated: 12/24/24 0637     PTT 28 seconds     Protime-INR [[id number]]  (Normal) Collected: 12/24/24 0603    Lab Status: Final result Specimen: Blood from Arm, Right Updated: 12/24/24 0637     Protime 14.1 seconds      INR 1.02    Narrative:      INR Therapeutic Range    Indication                                             INR Range      Atrial Fibrillation                                                2.0-3.0  Hypercoagulable State                                    2.0.2.3  Left Ventricular Asist Device                            2.0-3.0  Mechanical Heart Valve                                  -    Aortic(with afib, MI, embolism, HF, LA enlargement,    and/or coagulopathy)                                     2.0-3.0 (2.5-3.5)     Mitral                                                             2.5-3.5  Prosthetic/Bioprosthetic Heart Valve               2.0-3.0  Venous thromboembolism (VTE: VT, PE        2.0-3.0    Lactic acid, plasma (w/reflex if result > 2.0) [[id number]]  (Normal) Collected: 12/24/24 0142    Lab Status: Final result Specimen: Blood from Arm, Right Updated: 12/24/24 0209     LACTIC ACID 1.0 mmol/L     Narrative:      Result may be elevated if tourniquet was used during collection.    Urine Microscopic [[id number]]  (Normal) Collected: 12/24/24 0140    Lab Status: Final result Specimen: Urine, Clean Catch Updated: 12/24/24 0159     RBC, UA None Seen /hpf      WBC, UA None Seen /hpf      Epithelial Cells None Seen /hpf      Bacteria, UA None Seen /hpf     UA w Reflex to Microscopic w Reflex to Culture [[id number]]  (Abnormal) Collected: 12/24/24 0140    Lab Status: Final result Specimen: Urine, Clean Catch Updated: 12/24/24 0158     Color, UA Light Yellow     Clarity, UA Clear     Specific Gravity, UA 1.011     pH, UA 5.5     Leukocytes, UA Negative     Nitrite, UA Negative     Protein, UA 70 (1+) mg/dl      Glucose, UA Negative mg/dl      Ketones, UA Negative mg/dl      Urobilinogen, UA <2.0 mg/dl      Bilirubin, UA Negative     Occult Blood, UA Negative    Comprehensive metabolic panel [[id number]]  (Abnormal) Collected: 12/24/24 0128    Lab Status: Final result Specimen: Blood from Arm, Right Updated: 12/24/24 0154     Sodium 140 mmol/L      Potassium 3.6 mmol/L      Chloride 105 mmol/L      CO2 23 mmol/L      ANION GAP 12 mmol/L      BUN 41 mg/dL      Creatinine 2.08 mg/dL      Glucose 200  mg/dL      Calcium 9.9 mg/dL      AST 13 U/L      ALT 7 U/L      Alkaline Phosphatase 80 U/L      Total Protein 7.9 g/dL      Albumin 4.3 g/dL      Total Bilirubin 0.42 mg/dL      eGFR 28 ml/min/1.73sq m     Narrative:      National Kid[employer] Disease Foundation guidelines for Chronic Kidney Disease (CKD):     Stage 1 with normal or high GFR (GFR > 90 mL/min/1.73 square meters)    Stage 2 Mild CKD (GFR = 60-89 mL/min/1.73 square meters)    Stage 3A Moderate CKD (GFR = 45-59 mL/min/1.73 square meters)    Stage 3B Moderate CKD (GFR = 30-44 mL/min/1.73 square meters)    Stage 4 Severe CKD (GFR = 15-29 mL/min/1.73 square meters)    Stage 5 End Stage CKD (GFR <15 mL/min/1.73 square meters)  Note: GFR calculation is accurate only with a steady state creatinine    Lipase [[id number]]  (Abnormal) Collected: 12/24/24 0128    Lab Status: Final result Specimen: Blood from Arm, Right Updated: 12/24/24 0154     Lipase 7 u/L     CBC and differential [[id number]]  (Abnormal) Collected: 12/24/24 0128    Lab Status: Final result Specimen: Blood from Arm, Right Updated: 12/24/24 0137     WBC 11.11 Thousand/uL      RBC 3.89 Million/uL      Hemoglobin 12.2 g/dL      Hematocrit 36.6 %      MCV 94 fL      MCH 31.4 pg      MCHC 33.3 g/dL      RDW 14.9 %      MPV 11.0 fL      Platelets 186 Thousands/uL      nRBC 0 /100 WBCs      Segmented % 86 %      Immature Grans % 1 %      Lymphocytes % 8 %      Monocytes % 3 %      Eosinophils Relative 2 %      Basophils Relative 0 %      Absolute Neutrophils 9.57 Thousands/µL      Absolute Immature Grans 0.06 Thousand/uL      Absolute Lymphocytes 0.86 Thousands/µL      Absolute Monocytes 0.38 Thousand/µL      Eosinophils Absolute 0.20 Thousand/µL      Basophils Absolute 0.04 Thousands/µL             CTA abdomen pelvis w wo contrast   Final Interpretation by Nicholas Aaron DO (12/24 0701)      Moderate to severe atherosclerosis, no large vessel occlusion is seen. The large branches of the aorta  appear patent.      Status post right hemicolectomy. There is moderate wall thickening involving several small bowel loops adjacent to the anastomosis (axial image 71, series 310), for example. Surrounding fat stranding in this region. Questioned pneumatosis involving some    of these thickened bowel loops. Portal venous gas redemonstrated as described. Findings taken together are highly suspicious for bowel ischemia in this region.      No evidence of bowel obstruction.      Scattered pulmonary emphysematous changes, pancreatic atrophy with multiple pancreatic calcifications suggesting chronic pancreatitis, renal cysts, nephrolithiasis, and other findings as above.      The study was marked in EPIC for immediate notification.         Workstation performed: [id number]         CT abdomen pelvis wo contrast   Final Interpretation by Magan Ann MD (12/24 0318)      Findings as above highly concerning for mesenteric and/or bowel ischemia.   No definite bowel wall pneumatosis or free intraperitoneal air identified.   Urgent surgical/critical care consultation advised.      Aforementioned findings were discussed with Dr. Knowles at 3:14 a.m. on 12/24/2024.      Workstation performed: [id number]             Procedures    ED Medication and Procedure Management   Prior to Admission Medications   Prescriptions Last Dose Informant Patient Reported? Taking?   Empagliflozin (Jardiance) 10 MG TABS tablet Not Taking Self No No   Sig: Take 1 tablet (10 mg total) by mouth every morning   Patient not taking: Reported on 11/11/2024   allopurinol (ZYLOPRIM) 300 mg tablet  Self No No   Sig: TAKE 1 TABLET DAILY   amLODIPine (NORVASC) 10 mg tablet  Self No No   Sig: TAKE 1 TABLET DAILY   atorvastatin (LIPITOR) 80 mg tablet   No No   Sig: Take 1 tablet (80 mg total) by mouth daily at bedtime   bacitracin topical ointment 500 units/g topical ointment  Self No No   Sig: Apply 1 large application topically 2 (two) times a day for 7 days    Patient not taking: Reported on 11/11/2024   calcitriol (ROCALTROL) 0.25 mcg capsule  Self No No   Sig: Take 1 capsule (0.25 mcg total) by mouth daily   clopidogrel (PLAVIX) 75 mg tablet   No No   Sig: TAKE 1 TABLET DAILY   ferrous sulfate 325 (65 Fe) mg tablet  Self Yes No   Sig: Take 325 mg by mouth every other day   glimepiride (AMARYL) 1 mg tablet  Self No No   Sig: Take 1 tablet (1 mg total) by mouth daily with breakfast   hydrALAZINE (APRESOLINE) 25 mg tablet  Self No No   Sig: Take 1 tablet (25 mg total) by mouth 2 (two) times a day   lisinopril (ZESTRIL) 5 mg tablet  Self No No   Sig: Take 1 tablet (5 mg total) by mouth daily   metoprolol succinate (TOPROL-XL) 25 mg 24 hr tablet  Self No No   Sig: Take 0.5 tablets (12.5 mg total) by mouth daily   pancrelipase, Lip-Prot-Amyl, (Creon) 24,000 units   No No   Sig: TAKE 1 CAPSULE THREE TIMES A DAY WITH MEALS   pantoprazole (PROTONIX) 40 mg tablet  Self No No   Sig: Take 1 tablet (40 mg total) by mouth 2 (two) times a day      Facility-Administered Medications: None     Patient's Medications   Discharge Prescriptions    No medications on file     No discharge procedures on file.  ED SEPSIS DOCUMENTATION   Time reflects when diagnosis was documented in both MDM as applicable and the Disposition within this note       Time User Action Codes Description Comment    12/24/2024  3:36 AM Alvin Knowles [K55.9] Mesenteric ischemia (HCC)     12/24/2024  3:36 AM Alvin Knowles [R10.9] Abdominal pain     12/24/2024  3:36 AM Alvin Knowles [R11.2] Nausea and vomiting                  Alvin Knowles MD  12/24/24 0709       Alvin Knowles MD  12/24/24 0710       Alvin Knowles MD  12/24/24 0798

## 2024-12-24 NOTE — PLAN OF CARE
Problem: PAIN - ADULT  Goal: Verbalizes/displays adequate comfort level or baseline comfort level  Description: Interventions:  - Encourage patient to monitor pain and request assistance  - Assess pain using appropriate pain scale  - Administer analgesics based on type and severity of pain and evaluate response  - Implement non-pharmacological measures as appropriate and evaluate response  - Consider cultural and social influences on pain and pain management  - Notify physician/advanced practitioner if interventions unsuccessful or patient reports new pain  Outcome: Progressing     Problem: INFECTION - ADULT  Goal: Absence or prevention of progression during hospitalization  Description: INTERVENTIONS:  - Assess and monitor for signs and symptoms of infection  - Monitor lab/diagnostic results  - Monitor all insertion sites, i.e. indwelling lines, tubes, and drains  - Monitor endotracheal if appropriate and nasal secretions for changes in amount and color  - [place] appropriate cooling/warming therapies per order  - Administer medications as ordered  - Instruct and encourage patient and family to use good hand hygiene technique  - Identify and instruct in appropriate isolation precautions for identified infection/condition  Outcome: Progressing     Problem: SAFETY ADULT  Goal: Patient will remain free of falls  Description: INTERVENTIONS:  - Educate patient/family on patient safety including physical limitations  - Instruct patient to call for assistance with activity   - Consult OT/PT to assist with strengthening/mobility   - Keep Call bell within reach  - Keep bed low and locked with side rails adjusted as appropriate  - Keep care items and personal belongings within reach  - Initiate and maintain comfort rounds  - Make Fall Risk Sign visible to staff  - Offer Toileting every 2 Hours, in advance of need  - Initiate/Maintain bed alarm  - Obtain necessary fall risk management equipment: alarms  - Apply yellow  socks and bracelet for high fall risk patients  - Consider moving patient to room near nurses station  Outcome: Progressing     Problem: DISCHARGE PLANNING  Goal: Discharge to home or other facility with appropriate resources  Description: INTERVENTIONS:  - Identify barriers to discharge w/patient and caregiver  - Arrange for needed discharge resources and transportation as appropriate  - Identify discharge learning needs (meds, wound care, etc.)  - Arrange for interpretive services to assist at discharge as needed  - Refer to Case Management Department for coordinating discharge planning if the patient needs post-hospital services based on physician/advanced practitioner order or complex needs related to functional status, cognitive ability, or social support system  Outcome: Progressing     Problem: Knowledge Deficit  Goal: Patient/family/caregiver demonstrates understanding of disease process, treatment plan, medications, and discharge instructions  Description: Complete learning assessment and assess knowledge base.  Interventions:  - Provide teaching at level of understanding  - Provide teaching via preferred learning methods  Outcome: Progressing

## 2024-12-24 NOTE — CONSULTS
Consult note- Vascular Surgery   Name: Jay Roach Jr. 79 y.o. male I MRN: [id number]  Unit/Bed#: OR POOL I Date of Admission: 12/24/2024   Date of Service: 12/24/2024 I Hospital Day: 0     Assessment & Plan  Mesenteric ischemia (HCC)  79-year-old male well-known to our service having undergone multiple lower extremity procedures including common femoral endarterectomy with angioplasty and stenting of the left lower extremity and left [place] to AT bypass, right lower extremity femoral endarterectomy and angioplasty.  Bilateral carotid artery stenosis.  Chronic kidney disease stage IV.  Current smoker.  Presents with abdominal pain ongoing for few days.  Prior history also includes appendectomy with right hemicolectomy in the past.    CT angiogram reviewed in detail by me personally, there is pneumatosis in the bowel of the right lower quadrant.  Patient also has calcified atherosclerotic stenosis in the proximal SMA and celiac artery.  There is also diffuse calcifications in the SMA.    Patient will benefit from urgent abdominal exploration and possible bowel resection with general surgery service.  We will perform retrograde open mesenteric artery stenting at that point.  This is a high risk condition.  Extensive discussion with the patient and his son regarding the grave prognosis with risk of mortality, organ failure including kidney failure and dialysis, need for additional surgeries, possibly need for colostomy or ileostomy.  Due to underlying comorbid conditions include chronic kidney disease stage IV, COPD, coronary artery disease, active smoking patient is at high risk for mortality, mortality risk can be as high as 50 to 60% in these cases of acute mesenteric ischemia.    We discussed options including surgical obscuration and palliative hospice care.  Patient prefers to go ahead with the surgical approach.  He has instructed us that in case there is a significant level of ischemia or nonsalvageable  situation he will like to be made comfortable and not pursue additional aggressive measures.    The severity of the active illness was significant enough to lead to patient's presentation to be evaluated for a diagnosis and considered for surgical intervention versus continuing nonsurgical therapies. After personally assessing the patient, I conclude that the patient's problem is complexity level 8. (1-minor/self-limited, 2-stable and chronic, 3-acute but uncomplicated, 4-chronic but worsening, 5-previously undiagnosed/diagnosis determined on this visit, 6-acute and with systemic symptoms/acute complicated, 7-chronic and with severe exacerbation, 8-can be life or limb threatening).      Consulting Service: General Surgery    Chief Complaint: abdominal pain in right side of my belly, nausea, cannot eat    HPI: Jay Roach Jr. is a 79 y.o. male who presents with several days history of right lower quadrant abdominal pain nausea and vomiting..    Review of Systems:  General: positive for  - fatigue and weight loss  Cardiovascular: no chest pain or dyspnea on exertion  Respiratory: no cough, shortness of breath, or wheezing  Gastrointestinal: positive for - abdominal pain, appetite loss, and nausea/vomiting  Genitourinary ROS: no dysuria, trouble voiding, or hematuria  Musculoskeletal ROS: positive for - pain in leg - generalized  Neurological ROS: no TIA or stroke symptoms  Hematological and Lymphatic ROS: negative  Dermatological ROS: negative  Psychological ROS: negative  Ophthalmic ROS: negative  ENT ROS: negative    Past Medical History:  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 (HCC) 02/24/2023    Basal cell carcinoma     right cheek    CAD (coronary artery disease)     Carotid stenosis, asymptomatic, bilateral      Chronic kidney disease     Colon polyp     Dependence on respirator (ventilator) status ([facility]) 04/07/2023    Diabetes ([facility])     type 2, non-insulin dependent    Diabetes mellitus (HCC)     GERD (gastroesophageal reflux disease)     History of nephrolithiasis     Hyperlipidemia     Hypertension     Left foot pain 11/30/2022    Lung cancer (HCC)     PAD (peripheral artery disease) ([facility])     Severe aortic stenosis     Squamous cell skin cancer 11/22/2022    left superior helix       Past Surgical History:  Past Surgical History:   Procedure Laterality Date    APPENDECTOMY      CARDIAC CATHETERIZATION N/A 05/05/2022    Procedure: CARDIAC RHC/LHC;  Surgeon: Arvin Sanchez DO;  [facility];  Service: Cardiology    CARDIAC CATHETERIZATION N/A 05/05/2022    Procedure: Cardiac Coronary Angiogram;  Surgeon: Arvin Sanchez DO;  [facility];  Service: Cardiology    CARDIAC CATHETERIZATION N/A 05/24/2022    Procedure: Cardiac pci;  Surgeon: Damien Jeter MD;  [facility]: [facility];  Service: Cardiology    CARDIAC CATHETERIZATION N/A 06/14/2022    Procedure: CARDIAC TAVR;  Surgeon: Nahum Vaz MD;  Location: [facility];  Service: Cardiology    COLECTOMY      COLONOSCOPY  2013    CORONARY ARTERY BYPASS GRAFT  2013    X 2    FEMORAL ARTERY - POPLITEAL ARTERY BYPASS GRAFT      HEMORRHOID SURGERY      IR AORTAGRAM WITH RUN-OFF  11/19/2018    IR AORTAGRAM WITH RUN-OFF  03/02/2023    IR BIOPSY LUNG  4/17/2024    IR LOWER EXTREMITY ANGIOGRAM  03/23/2023    MOHS SURGERY Left 01/11/2023    SCC left superior helix-Dr Guy    ND SLCTV CATHJ 3RD+ ORD SLCTV ABDL PEL/LXTR BRNCH Left 08/12/2016    Procedure: LEFT FEMORAL ARTERIOGRAM; BALLOON ANGIOPLASTY; SFA  AND FEMORAL AT VEIN GRAFT;  Surgeon: Nicholas Urena MD;  Location: [facility];  Service: Vascular    ND TEAEC W/WO PATCH GRAFT COMMON FEMORAL Right 03/23/2023    Procedure: Common femoral endarterectomy&antegrade intervention of  SFA, popliteal artery w/ shockwave;  Surgeon: Eagle Higuera MD;  Location: [facility];  Service: Vascular    MO TRANSCATHETER TRANSAPICAL REPLACEMT AORTIC VALVE N/A 06/14/2022    Procedure: REPLACEMENT AORTIC VALVE TRANSCATHETER (TAVR) TRANSAPICAL 29MM IRVIN KYLER S3 ULTRA VALVE(ACCESS ON LEFT) ELANA;  Surgeon: Amarjit Gordon DO;  Location: [facility];  Service: Cardiac Surgery    SKIN CANCER EXCISION      TONSILLECTOMY AND ADENOIDECTOMY      UPPER GASTROINTESTINAL ENDOSCOPY         Social History:  Social History     Substance and Sexual Activity   Alcohol Use Not Currently    Comment: rare     Social History     Substance and Sexual Activity   Drug Use No     Social History     Tobacco Use   Smoking Status Every Day    Current packs/day: 1.00    Average packs/day: 0.6 packs/day for 100.0 years (62.5 ttl pk-yrs)    Types: Cigarettes    Passive exposure: Current   Smokeless Tobacco Never   Tobacco Comments    4 cigarettes per day       Family History:  Family History   Problem Relation Age of Onset    Heart attack Father     Other Sister         bypass and vlave replacement    Stroke Paternal Uncle     Arrhythmia Neg Hx     Asthma Neg Hx     Clotting disorder Neg Hx     Fainting Neg Hx     Anuerysm Neg Hx     Hypertension Neg Hx         unsure     Hyperlipidemia Neg Hx     Heart failure Neg Hx        Allergies:  No Known Allergies    Medications:    Current Facility-Administered Medications:     heparin (porcine) 25,000 units in 0.45% NaCl 250 mL infusion (premix), Titrated, Last Rate: Stopped (12/24/24 1129)    [Transfer Hold] heparin (porcine) injection 3,200 Units, Q6H PRN    [Transfer Hold] heparin (porcine) injection 6,400 Units, Once    [Transfer Hold] heparin (porcine) injection 6,400 Units, Q6H PRN    [Transfer Hold] HYDROmorphone (DILAUDID) injection 0.5 mg, Q3H PRN    [Transfer Hold] HYDROmorphone HCl (DILAUDID) injection 0.2 mg, Q3H PRN    lactated ringers infusion, Continuous, Last Rate: 125  "mL/hr (12/24/24 1128)    [Transfer Hold] nicotine (NICODERM CQ) 14 mg/24hr TD 24 hr patch 1 patch, Daily    [Transfer Hold] ondansetron (ZOFRAN) injection 4 mg, Q6H PRN    Facility-Administered Medications Ordered in Other Encounters:     fentaNYL injection, PRN    lactated ringers infusion, Continuous PRN    lidocaine (PF) (XYLOCAINE-MPF) 1 % injection, PRN    propofol (DIPRIVAN) 200 MG/20ML bolus injection, PRN    ROCuronium (ZEMURON) injection, PRN    sodium chloride 0.9 % infusion, Continuous PRN    Vitals:  /63   Pulse 85   Temp 99 °F (37.2 °C)   Resp 16   Ht 5' 10\" (1.778 m)   Wt 77.8 kg (171 lb 8.3 oz)   SpO2 94%   BMI 24.61 kg/m²     I/Os:  No intake/output data recorded.  No intake/output data recorded.    Lab Results and Cultures:   CBC with diff:   Lab Results   Component Value Date    WBC 11.11 (H) 12/24/2024    HGB 12.2 12/24/2024    HCT 36.6 12/24/2024    MCV 94 12/24/2024     12/24/2024    RBC 3.89 12/24/2024    MCH 31.4 12/24/2024    MCHC 33.3 12/24/2024    RDW 14.9 12/24/2024    MPV 11.0 12/24/2024    NRBC 0 12/24/2024   ,   BMP/CMP:  Lab Results   Component Value Date     11/22/2015    K 3.6 12/24/2024    K 4.1 11/22/2015     12/24/2024     11/22/2015    CO2 23 12/24/2024    CO2 25 03/23/2023    ANIONGAP 7 11/22/2015    BUN 41 (H) 12/24/2024    BUN 15 11/22/2015    CREATININE 2.08 (H) 12/24/2024    CREATININE 0.90 11/22/2015    GLUCOSE 163 (H) 03/23/2023    GLUCOSE 125 11/22/2015    CALCIUM 9.9 12/24/2024    CALCIUM 8.4 11/22/2015    AST 13 12/24/2024    ALT 7 12/24/2024    ALKPHOS 80 12/24/2024    EGFR 28 12/24/2024   ,   Lipid Panel: No results found for: \"CHOL\",   Coags:   Lab Results   Component Value Date    PTT 28 12/24/2024    PTT 27 10/28/2015    INR 1.02 12/24/2024    INR 1.14 10/30/2015   ,     Blood Culture: No results found for: \"BLOODCX\",   Urinalysis:   Lab Results   Component Value Date    COLORU Light Yellow 12/24/2024    CLARITYU Clear " "12/24/2024    SPECGRAV 1.011 12/24/2024    PHUR 5.5 12/24/2024    LEUKOCYTESUR Negative 12/24/2024    NITRITE Negative 12/24/2024    GLUCOSEU Negative 12/24/2024    KETONESU Negative 12/24/2024    BILIRUBINUR Negative 12/24/2024    BLOODU Negative 12/24/2024   ,   Urine Culture: No results found for: \"URINECX\",   Wound Culure: No results found for: \"WOUNDCULT\"    Imaging:  CT angiogram imaging reviewed as above    Physical Exam:    General appearance: mild distress, elderly frail male, awake and alert, oriented x 3  Head: Normocephalic, without obvious abnormality, atraumatic  Eyes: conjunctivae/corneas clear. PERRL, EOM's intact. Fundi benign.  Throat: lips, mucosa, and tongue normal; teeth and gums normal and dry mucosa  Neck: no adenopathy, no carotid bruit, no JVD, supple, symmetrical, trachea midline, and thyroid not enlarged, symmetric, no tenderness/mass/nodules  Back: symmetric, no curvature. ROM normal. No CVA tenderness.  Lungs: clear to auscultation bilaterally  Chest wall: no tenderness  Heart: regular rate and rhythm, S1, S2 normal, no murmur, click, rub or gallop  Abdomen: Right lower quadrant tenderness.  No guarding  Genitalia: normal, deferred    Extremities: Delayed cap refill, no edema  Skin: Dry skin  Neurologic: Grossly normal        Eagle Higuera MD  12/24/2024  "

## 2024-12-24 NOTE — OP NOTE
OPERATIVE REPORT  PATIENT NAME: Jay Roach Jr.    :  1945  MRN: [id number]  Pt Location: [facility]    SURGERY DATE: 2024    Surgeons and Role:  Panel 1: Acute care surgery     * Alvin Flores MD - Primary     * Jb Samuel MD - Assisting  Panel 2: Vascular Surgery     * Eagle Higuera MD - Primary  Kasie Cardoso - vascular fellow    Preop Diagnosis:  Mesenteric ischemia (HCC) [K55.9]    Post-Op Diagnosis Codes:     * Mesenteric ischemia (HCC) [K55.9]    Procedure(s):  Retrograde open mesenteric stent  Mesenteric angiogram  Superior mesenteric artery stent with 7mm x 19mm VBX balloon expandable stent    Specimen(s):  * No specimens in log *    Estimated Blood Loss:   Minimal    Drains:  Urethral Catheter Latex 16 Fr. (Active)   Number of days: 0       Anesthesia Type:   Choice    Operative Indications:  Mesenteric ischemia (HCC) [K55.9]      Operative Findings:  Bowel is edematous but pink.    Angiographic findings and radiographic interpretation of test:  There is high grade heavily calcified proximal SMA stenosis  After balloon angioplasty and stent there is excellent resolution of stenosis and brisk flow in the SMA branches      Total contrast used 56 cc Visipaque  Total fluoroscopy time 8.4 minutes  1572 mGy  119.68 DAP    Complications:   None    Procedure and Technique:  Pt was taken for emergency exploratory laparotomy by gen surg / acute care service.  Once they lysed adhesions and explored all bowel they found no areas of derian necrosis requiring resection and handed over case to us.      The transverse mesocolon was retracted cephalad and the root of the mesentery was exposed.  A small transverse opening was created in the peritoneal fold overlying the root of the mesentery.  We dissected carefully and identified the superior mesenteric vein and its branches and then dissected to the left of this to identify the branch of the superior mesenteric artery.   It had a weak Doppler signal on it.  Vesseloops were encircled proximal and distal to this.  Patient was given 4000 units of intravenous heparin.  A U-stitch was performed in the superior mesenteric artery using 6-0 Prolene.  Using a micropuncture cath needle we punctured the mesenteric artery in a retrograde fashion.  Microwire was then advanced and a micro sheath was then advanced.  Arteriogram was demonstrated to make sure we were in the patent segment of the mesenteric artery.  We then placed the J-wire and a 5 [place] sheath into the access.  Then using a glide catheter and a V18 wire we were able to cross the lesion in the true lumen and entered into the aorta.  We then exchanged to an Amplatz wire which was placed into the thoracic aorta.  We tried to track a 6 [place] 30 cm sheath into the aorta across the lesion but the lesion was heavily calcified and stenotic hence predilatation with 4 mm balloon was required in order to advance the sheath into the aorta.    Then we advanced the 7 x 19 mm VBX across the lesion through the sheath.  The sheath was then retracted back to expose the stent.  The stent was positioned accurately such that there was 2 mm overlap into the aorta.  The balloon was taken to nominal pressure to expand the stent.  We noticed that there was still some residual stenosis in the stent at the level of the heavily calcified lesion.  Hence order V18 wire we placed a 6 x 20 mm Yehuda balloon into the stent and inflated to 14 liz pressure to achieve optimal luminal gain in the stent.  Completion angiogram was satisfactory as wires were pulled out.  The sheath was pulled out and the U-stitch was tied down to close the access.  Additional stitch was required at 1 point to achieve secure hemostasis.  Doppler auscultation demonstrated excellent low resistance flow in this portion of the superior mesenteric artery.  A Surgicel was placed over this area.  We then irrigated the wound and closed the  defect in the mesentery with 3-0 Vicryl sutures.    The case was then handed back to general surgery team for further abdominal exploration and closure.     I was present for the entire procedure. and A co-surgeon was required because of skills and techniques relevant to speciality.  Due to acute mesenteric ischemia and concern for bowel ischemia both acute care surgeon and vascular surgeon had to work simultaneously in this case.    Patient Disposition:  PACU  and guarded condition    This procedure was not performed to treat colon cancer through resection    Patient to continue daily Plavix 75 mg.         SIGNATURE: Eagle Higuera MD  DATE: December 24, 2024  TIME: 3:32 PM

## 2024-12-24 NOTE — ANESTHESIA PROCEDURE NOTES
"Central Line Insertion    Performed by: Yarely Crystal MD  Authorized by: Yarely Crystal MD    Date/Time: 12/24/2024 1:22 PM  Catheter Type:  triple lumen  Consent: Written consent obtained.  Consent given by: patient  Patient identity confirmed: anonymous protocol, patient vented/unresponsive  Time out: Immediately prior to procedure a \"time out\" was called to verify the correct patient, procedure, equipment, support staff and site/side marked as required.  Indications: vascular access  Catheter size: 7 Fr  Patient position: Trendelenburg    Sedation:  Patient sedated: no    Assessment: blood return through all ports  Preparation: skin prepped with ChloraPrep  Skin prep agent dried: skin prep agent completely dried prior to procedure  Sterile barriers: all five maximum sterile barriers used - cap, mask, sterile gown, sterile gloves, and large sterile sheet  Hand hygiene: hand hygiene performed prior to central venous catheter insertion  sterile gel and probe cover used in ultrasound-guided central venous catheter insertionNumber of attempts: 1  Successful placement: yes  Post-procedure: line sutured, dressing applied and chlorhexidine patch applied  Patient tolerance: Patient tolerated the procedure well with no immediate complications  independent trained observer present for patient monitoring - Aly Stiles      "

## 2024-12-24 NOTE — EMTALA/ACUTE CARE TRANSFER
[facility] EMERGENCY DEPARTMENT   [facility]  [place] [address]  Dept: [phone]      EMTALA TRANSFER CONSENT    NAME Jay Roach Jr.                                         1945                              MRN [id number]    I have been informed of my rights regarding examination, treatment, and transfer   by Dr. Holland Frazier MD    Benefits:      Risks:        Transfer Request   I acknowledge that my medical condition has been evaluated and explained to me by the emergency department physician or other qualified medical person and/or my attending physician who has recommended and offered to me further medical examination and treatment. I understand the Hospital's obligation with respect to the treatment and stabilization of my emergency medical condition. I nevertheless request to be transferred. I release the Hospital, the doctor, and any other persons caring for me from all responsibility or liability for any injury or ill effects that may result from my transfer and agree to accept all responsibility for the consequences of my choice to transfer, rather than receive stabilizing treatment at the Hospital. I understand that because the transfer is my request, my insurance may not provide reimbursement for the services.  The Hospital will assist and direct me and my family in how to make arrangements for transfer, but the hospital is not liable for any fees charged by the transport service.  In spite of this understanding, I refuse to consent to further medical examination and treatment which has been offered to me, and request transfer to  . I authorize the performance of emergency medical procedures and treatments upon me in both transit and upon arrival at the receiving facility.  Additionally, I authorize the release of any and all medical records to the receiving facility and request they be transported with me, if possible.    I authorize the  performance of emergency medical procedures and treatments upon me in both transit and upon arrival at the receiving facility.  Additionally, I authorize the release of any and all medical records to the receiving facility and request they be transported with me, if possible.  I understand that the safest mode of transportation during a medical emergency is an ambulance and that the Hospital advocates the use of this mode of transport. Risks of traveling to the receiving facility by car, including absence of medical control, life sustaining equipment, such as oxygen, and medical personnel has been explained to me and I fully understand them.    (LETITIA CORRECT BOX BELOW)  [  ]  I consent to the stated transfer and to be transported by ambulance/helicopter.  [  ]  I consent to the stated transfer, but refuse transportation by ambulance and accept full responsibility for my transportation by car.  I understand the risks of non-ambulance transfers and I exonerate the Hospital and its staff from any deterioration in my condition that results from this refusal.    X___________________________________________    DATE  24  TIME________  Signature of patient or legally responsible individual signing on patient behalf           RELATIONSHIP TO PATIENT_________________________          Provider Certification    NAME Jay Roach                                          1945                              MRN [id number]    A medical screening exam was performed on the above named patient.  Based on the examination:    Condition Necessitating Transfer The primary encounter diagnosis was Mesenteric ischemia (HCC). Diagnoses of Abdominal pain and Nausea and vomiting were also pertinent to this visit.    Patient Condition:  Stable    Reason for Transfer:  Specialty not available at this campus    Transfer Requirements: Facility     Space available and qualified personnel available for treatment as acknowledged  by    Agreed to accept transfer and to provide appropriate medical treatment as acknowledged by          Appropriate medical records of the examination and treatment of the patient are provided at the time of transfer   STAFF INITIAL WHEN COMPLETED _______  Transfer will be performed by qualified personnel from    and appropriate transfer equipment as required, including the use of necessary and appropriate life support measures.    Provider Certification: I have examined the patient and explained the following risks and benefits of being transferred/refusing transfer to the patient/family:         Based on these reasonable risks and benefits to the patient and/or the unborn child(lux), and based upon the information available at the time of the patient’s examination, I certify that the medical benefits reasonably to be expected from the provision of appropriate medical treatments at another medical facility outweigh the increasing risks, if any, to the individual’s medical condition, and in the case of labor to the unborn child, from effecting the transfer.    X____________________________________________ DATE 12/24/24        TIME_______      ORIGINAL - SEND TO MEDICAL RECORDS   COPY - SEND WITH PATIENT DURING TRANSFER

## 2024-12-24 NOTE — PLAN OF CARE
Problem: PAIN - ADULT  Goal: Verbalizes/displays adequate comfort level or baseline comfort level  Description: Interventions:  - Encourage patient to monitor pain and request assistance  - Assess pain using appropriate pain scale  - Administer analgesics based on type and severity of pain and evaluate response  - Implement non-pharmacological measures as appropriate and evaluate response  - Consider cultural and social influences on pain and pain management  - Notify physician/advanced practitioner if interventions unsuccessful or patient reports new pain  Outcome: Progressing     Problem: INFECTION - ADULT  Goal: Absence or prevention of progression during hospitalization  Description: INTERVENTIONS:  - Assess and monitor for signs and symptoms of infection  - Monitor lab/diagnostic results  - Monitor all insertion sites, i.e. indwelling lines, tubes, and drains  - Monitor endotracheal if appropriate and nasal secretions for changes in amount and color  - [place] appropriate cooling/warming therapies per order  - Administer medications as ordered  - Instruct and encourage patient and family to use good hand hygiene technique  - Identify and instruct in appropriate isolation precautions for identified infection/condition  Outcome: Progressing     Problem: SAFETY ADULT  Goal: Patient will remain free of falls  Description: INTERVENTIONS:  - Educate patient/family on patient safety including physical limitations  - Instruct patient to call for assistance with activity   - Consult OT/PT to assist with strengthening/mobility   - Keep Call bell within reach  - Keep bed low and locked with side rails adjusted as appropriate  - Keep care items and personal belongings within reach  - Initiate and maintain comfort rounds  - Make Fall Risk Sign visible to staff    - Apply yellow socks and bracelet for high fall risk patients  - Consider moving patient to room near nurses station  Outcome: Progressing     Problem:  DISCHARGE PLANNING  Goal: Discharge to home or other facility with appropriate resources  Description: INTERVENTIONS:  - Identify barriers to discharge w/patient and caregiver  - Arrange for needed discharge resources and transportation as appropriate  - Identify discharge learning needs (meds, wound care, etc.)  - Arrange for interpretive services to assist at discharge as needed  - Refer to Case Management Department for coordinating discharge planning if the patient needs post-hospital services based on physician/advanced practitioner order or complex needs related to functional status, cognitive ability, or social support system  Outcome: Progressing     Problem: Knowledge Deficit  Goal: Patient/family/caregiver demonstrates understanding of disease process, treatment plan, medications, and discharge instructions  Description: Complete learning assessment and assess knowledge base.  Interventions:  - Provide teaching at level of understanding  - Provide teaching via preferred learning methods  Outcome: Progressing     Problem: CARDIOVASCULAR - ADULT  Goal: Maintains optimal cardiac output and hemodynamic stability  Description: INTERVENTIONS:  - Monitor I/O, vital signs and rhythm  - Monitor for S/S and trends of decreased cardiac output  - Administer and titrate ordered vasoactive medications to optimize hemodynamic stability  - Assess quality of pulses, skin color and temperature  - Assess for signs of decreased coronary artery perfusion  - Instruct patient to report change in severity of symptoms  Outcome: Progressing  Goal: Absence of cardiac dysrhythmias or at baseline rhythm  Description: INTERVENTIONS:  - Continuous cardiac monitoring, vital signs, obtain 12 lead EKG if ordered  - Administer antiarrhythmic and heart rate control medications as ordered  - Monitor electrolytes and administer replacement therapy as ordered  Outcome: Progressing     Problem: RESPIRATORY - ADULT  Goal: Achieves optimal  ventilation and oxygenation  Description: INTERVENTIONS:  - Assess for changes in respiratory status  - Assess for changes in mentation and behavior  - Position to facilitate oxygenation and minimize respiratory effort  - Oxygen administered by appropriate delivery if ordered  - Initiate smoking cessation education as indicated  - Encourage broncho-pulmonary hygiene including cough, deep breathe, Incentive Spirometry  - Assess the need for suctioning and aspirate as needed  - Assess and instruct to report SOB or any respiratory difficulty  - Respiratory Therapy support as indicated  Outcome: Progressing     Problem: GASTROINTESTINAL - ADULT  Goal: Minimal or absence of nausea and/or vomiting  Description: INTERVENTIONS:  - Administer IV fluids if ordered to ensure adequate hydration  - Maintain NPO status until nausea and vomiting are resolved  - Nasogastric tube if ordered  - Administer ordered antiemetic medications as needed  - Provide nonpharmacologic comfort measures as appropriate  - Advance diet as tolerated, if ordered  - Consider nutrition services referral to assist patient with adequate nutrition and appropriate food choices  Outcome: Progressing  Goal: Maintains or returns to baseline bowel function  Description: INTERVENTIONS:  - Assess bowel function  - Encourage oral fluids to ensure adequate hydration  - Administer IV fluids if ordered to ensure adequate hydration  - Administer ordered medications as needed  - Encourage mobilization and activity  - Consider nutritional services referral to assist patient with adequate nutrition and appropriate food choices  Outcome: Progressing  Goal: Maintains adequate nutritional intake  Description: INTERVENTIONS:  - Monitor percentage of each meal consumed  - Identify factors contributing to decreased intake, treat as appropriate  - Assist with meals as needed  - Monitor I&O, weight, and lab values if indicated  - Obtain nutrition services referral as  needed  Outcome: Progressing     Problem: GENITOURINARY - ADULT  Goal: Maintains or returns to baseline urinary function  Description: INTERVENTIONS:  - Assess urinary function  - Encourage oral fluids to ensure adequate hydration if ordered  - Administer IV fluids as ordered to ensure adequate hydration  - Administer ordered medications as needed  - Offer frequent toileting  - Follow urinary retention protocol if ordered  Outcome: Progressing     Problem: METABOLIC, FLUID AND ELECTROLYTES - ADULT  Goal: Electrolytes maintained within normal limits  Description: INTERVENTIONS:  - Monitor labs and assess patient for signs and symptoms of electrolyte imbalances  - Administer electrolyte replacement as ordered  - Monitor response to electrolyte replacements, including repeat lab results as appropriate  - Instruct patient on fluid and nutrition as appropriate  Outcome: Progressing  Goal: Fluid balance maintained  Description: INTERVENTIONS:  - Monitor labs   - Monitor I/O and WT  - Instruct patient on fluid and nutrition as appropriate  - Assess for signs & symptoms of volume excess or deficit  Outcome: Progressing     Problem: HEMATOLOGIC - ADULT  Goal: Maintains hematologic stability  Description: INTERVENTIONS  - Assess for signs and symptoms of bleeding or hemorrhage  - Monitor labs  - Administer supportive blood products/factors as ordered and appropriate  Outcome: Progressing

## 2024-12-24 NOTE — ANESTHESIA PREPROCEDURE EVALUATION
Procedure:  LAPAROSCOPY DIAGNOSTIC POSSIBLE OPEN, POSS BOWEL RESECTION (Abdomen)    Relevant Problems   CARDIO   (+) Asymptomatic bilateral carotid artery stenosis   (+) CAD (coronary artery disease)   (+) Hyperlipidemia   (+) Hypertension   (+) Mesenteric ischemia (HCC)   (+) Portal Venous Gas   (+) Progressive angina (HCC)   (+) Renal artery stenosis, native, bilateral (HCC)   (+) Sinus bradycardia   (+) Stenosis of noncoronary bypass graft (HCC)      ENDO   (+) Controlled type 2 diabetes mellitus with stage 4 chronic kidney disease, without long-term current use of insulin (HCC)   (+) Secondary hyperparathyroidism of renal origin (HCC)   (+) Type 2 diabetes mellitus with stage 4 chronic kidney disease, without long-term current use of insulin (HCC)      GI/HEPATIC   (+) Chronic pancreatitis (HCC)   (+) GERD (gastroesophageal reflux disease)   (+) GI bleed   (+) Pancreatic insufficiency   (+) Peptic ulcer      /RENAL   (+) Acute kidney injury superimposed on chronic kidney disease  (HCC)   (+) Acute kidney injury superimposed on stage 4 chronic kidney disease (HCC)   (+) Benign hypertension with CKD (chronic kidney disease) stage IV (HCC)   (+) Benign hypertension with chronic kidney disease, stage III (HCC)   (+) CKD (chronic kidney disease) stage 4, GFR 15-29 ml/min (HCC)   (+) Nephrolithiasis   (+) Renal cyst, left      HEMATOLOGY   (+) Acute blood loss anemia   (+) Anemia   (+) Anemia due to stage 4 chronic kidney disease  (HCC)   (+) Iron deficiency anemia      NEURO/PSYCH   (+) Atherosclerosis of native arteries of extremities with intermittent claudication, bilateral legs (HCC)   (+) Progressive angina (HCC)   (+) Tension headache       6/8/23 Left Ventricle: Left ventricular cavity size is normal. Wall thickness is moderately increased. The left ventricular ejection fraction is 65%. Systolic function is normal. Wall motion is normal. Diastolic function is mildly abnormal, consistent with grade I  (abnormal) relaxation.    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 The prosthetic valve appears well-seated and appears to be functioning normally. There is trace paravalvular regurgitation. There is no evidence of transvalvular regurgitation. The gradient recorded across the prosthetic aortic valve is within the expected range. The aortic valve peak velocity is 1.81 m/s. The aortic valve peak gradient is 13 mmHg. The aortic valve mean gradient is 8 mmHg.  Aortic valve area is 1.76 cm².     Physical Exam    Airway    Mallampati score: III  TM Distance: >3 FB  Neck ROM: limited     Dental        Cardiovascular      Pulmonary      Other Findings        Anesthesia Plan  ASA Score- 4     Anesthesia Type- general with ASA Monitors.         Additional Monitors: arterial line and central venous line.    Airway Plan: ETT.    Comment: Discussed possibility of prolonged intubation.       Plan Factors-    Chart reviewed. EKG reviewed. Imaging results reviewed. Existing labs reviewed. Patient summary reviewed.    Patient is a current smoker.              Induction- intravenous.    Postoperative Plan- . Planned trial extubation    Perioperative Resuscitation Plan - Level 1 - Full Code.       Informed Consent- Anesthetic plan and risks discussed with patient.  I personally reviewed this patient with the CRNA. Discussed and agreed on the Anesthesia Plan with the CRNA..

## 2024-12-24 NOTE — RESULT ENCOUNTER NOTE
I left a message with Jay and advised he call the office if he had any questions.    Renal function slightly worsened to creatinine 2.58 mg /dl with initiation of Jardiance .  Not able to reach patient, left message to stop further torsemide if there is no lower extremity edema and to repeat BMP in 2 weeks.  Would continue Jardiance for now due to long-term benefits from use of Jardiance in terms of slowing CKD progression.

## 2024-12-24 NOTE — CONSULTS
Consultation - Surgery-General   Name: Jay Roach Jr. 79 y.o. male I MRN: [id number]  Unit/Bed#: ED-40 I Date of Admission: 12/24/2024   Date of Service: 12/24/2024 I Hospital Day: 0   Consult to surgery general  Consult performed by: Robin Lester MD  Consult ordered by: Holland Frazier MD  Reason for consult: concern for mesenteric ischemia  Assessment/Recommendations: Patient with new mesenteric air and fat stranding of right abdomen & pneumobilia requiring initiation of hep gtt & further evaluation with CTA abd. Vascular surgery consulted and patient to transfer to [facility] for further management.      Physician Requesting Evaluation: No att. providers found   Reason for Evaluation / Principal Problem: concern for    Assessment & Plan  Portal Venous Gas  Observed on CT imaging at presentation  Pneumatosis intestinalis  Observed on non-con CTAP 12/24. Redemonstrated on CTA abd/pelvis alongside fat stranding and portal venous gas highly suspicious for mesenteric ischemia.  Patient presenting with 1 week history of right-sided abdominal pain associated with nausea and nonbilious/nonbloody emesis.  Lactate = 1.0.  WBC = 11.1. Patient s/p distant R hemicolectomy following acute perforated appendicitis.    -NPO  -isolyte @50 (kidney protection)  -due to concerning constellation of radiographic findings, vascular surgery was consulted for further recommendations   -patient received IV bolus of isolyte for CT contrast given history of CKD   -initiation of heparin gtt (loading dose + maintenance)   -transfer to [facility] for management and coordination of care between general & vascular surgical services  -serial abdominal exams  -patient accepted by Dr. Evans at [facility] to be admitted to Red Surgery service  Hypertension  Home regimen includes: amlodipine, hydralyzine, metoprolol  Cigarette nicotine dependence with nicotine-induced disorder  Current smoker of 0.5 packs per day. >50 years.  S/P CABG (coronary artery  bypass graft)  Follows with cardiology outpatient. Last ECHO in June 2023 w/ LVEF=65%.  Asymptomatic bilateral carotid artery stenosis    Type 2 diabetes mellitus with stage 4 chronic kidney disease, without long-term current use of insulin ([facility])    Lab Results   Component Value Date    HGBA1C 7.3 (H) 09/04/2024     -takes glimepiride  -does not take jardiance  S/P TAVR (transcatheter aortic valve replacement)  -takes plavix (last dose 12/23 am)  Hyperlipidemia  -home statin  Pancreatic insufficiency  Treated at home with creon 24,000 TID w/ meals    I have discussed with Dr. Pathak the above plan to transfer to [facility] to the Red Surgery service under Dr. Evans. They agrees with the plan.    Robin Lester MD  General Surgery  12/24/24  8:06 AM      History of Present Illness   Jay Roach . is a 79 y.o. male w/ PMHx includes hypertension, diabetes, 50+ year smoking history, PAD s/p LLE angioplasty common femoral endarterectomy & stenting, s/p TAVR (t/w plavix--last dose yesterday morning), stage 4 CKD, and history of perforated appendicitis s/p ileocecetomy vs R hemicolectomy w/ end-to-side anastomosis who presents with 1 week history of abdominal pain. Pain is localized more on his right side (upper and lower quadrants) and does not radiate. Is associated with nausea + vomiting and black stools. He also has a history of upper GI bleed for which he had a EGD on 1/11/2024 and had 5 clips placed for flat pigmented (Mikey IIC) ulcers along greater curvature. Has followed with GI and has managed his symptoms with PPIs.    Despite having pain as early as last weekend, he decided to come to [facility] ED when he threw up yesterday. Described emesis containing mainly eaten food--denies blood/bile. Noncon CT scan collected demonstrated fat stranding with findings highly concerning for mesenteric ischemia however cannot tell without contrast; was withheld for his CKD. Cr=2.08 (down from 2.54 5x days  ago). WBC=11.1. Hgb=12.2. Lipase=7. Otherwise CMP WNL.     Despite multiple comorbidities, patient describes detailed history regarding medical knowledge and appears to be compliant with all of his care. Lives in apartment by himself with his son below him and his brother close by. No longer drinks and has distant history of marijuana use.  He has smoked tobacco for over 50 years, most recently smoking 0.5 packs/day.    Review of Systems   Constitutional:  Positive for appetite change. Negative for chills, fatigue and fever.   HENT:  Negative for congestion, ear pain, postnasal drip and sore throat.    Eyes: Negative.    Respiratory:  Negative for cough, shortness of breath and wheezing.    Cardiovascular:  Negative for chest pain and palpitations.   Gastrointestinal:  Positive for abdominal pain (rihgt-side), blood in stool, constipation, nausea and vomiting (non-bloody/non-bilious). Negative for abdominal distention, anal bleeding and diarrhea.   Genitourinary:  Negative for dysuria, frequency, hematuria and urgency.   Musculoskeletal:  Negative for arthralgias and back pain.   Skin:  Negative for color change and rash.   Allergic/Immunologic: Negative.    Neurological:  Negative for dizziness, seizures, syncope and headaches.   Psychiatric/Behavioral:  Negative for agitation. The patient is not nervous/anxious.    All other systems reviewed and are negative.    I have reviewed the patient's PMH, PSH, Social History, Family History, Meds, and Allergies  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) 02/24/2023    Basal cell carcinoma     right cheek    CAD (coronary artery disease)     Carotid stenosis, asymptomatic, bilateral     Chronic kidney disease     Colon polyp     Dependence on respirator  (ventilator) status ([facility]) 04/07/2023    Diabetes ([facility])     type 2, non-insulin dependent    Diabetes mellitus (HCC)     GERD (gastroesophageal reflux disease)     History of nephrolithiasis     Hyperlipidemia     Hypertension     Left foot pain 11/30/2022    Lung cancer ([facility])     PAD (peripheral artery disease) ([facility])     Severe aortic stenosis     Squamous cell skin cancer 11/22/2022    left superior helix     Past Surgical History:   Procedure Laterality Date    APPENDECTOMY      CARDIAC CATHETERIZATION N/A 05/05/2022    Procedure: CARDIAC RHC/LHC;  Surgeon: Arvin Sanchez DO;  Location: [facility];  Service: Cardiology    CARDIAC CATHETERIZATION N/A 05/05/2022    Procedure: Cardiac Coronary Angiogram;  Surgeon: Arvin Sanchez DO;  Location: [facility];  Service: Cardiology    CARDIAC CATHETERIZATION N/A 05/24/2022    Procedure: Cardiac pci;  Surgeon: Damien Jeter MD;  Location: [facility]ARDIAC C[facility];  Service: Cardiology    CARDIAC CATHETERIZATION N/A 06/14/2022    Procedure: CARDIAC TAVR;  Surgeon: Nahum Vaz MD;  Location: [facility];  Service: Cardiology    COLECTOMY      COLONOSCOPY  2013    CORONARY ARTERY BYPASS GRAFT  2013    X 2    FEMORAL ARTERY - POPLITEAL ARTERY BYPASS GRAFT      HEMORRHOID SURGERY      IR AORTAGRAM WITH RUN-OFF  11/19/2018    IR AORTAGRAM WITH RUN-OFF  03/02/2023    IR BIOPSY LUNG  4/17/2024    IR LOWER EXTREMITY ANGIOGRAM  03/23/2023    MOHS SURGERY Left 01/11/2023    SCC left superior helix-Dr Tovar    NC SLCTV CATHJ 3RD+ ORD SLCTV ABDL PEL/LXTR BRNCH Left 08/12/2016    Procedure: LEFT FEMORAL ARTERIOGRAM; BALLOON ANGIOPLASTY; SFA  AND FEMORAL AT VEIN GRAFT;  Surgeon: Nicholas Urena MD;  Location: [facility] MA[facility];  Service: Vascular    NC TEAEC W/WO PATCH GRAFT COMMON FEMORAL Right 03/23/2023    Procedure: Common femoral endarterectomy&antegrade intervention of SFA, popliteal artery w/ shockwave;  Surgeon: Eagle Higuera MD;  Location: [facility];   Service: Vascular    AR TRANSCATHETER TRANSAPICAL REPLACEMT AORTIC VALVE N/A 06/14/2022    Procedure: REPLACEMENT AORTIC VALVE TRANSCATHETER (TAVR) TRANSAPICAL 29MM IRVIN KYLER S3 ULTRA VALVE(ACCESS ON LEFT) ELANA;  Surgeon: Amarjit Gordon DO;  Location: [facility];  Service: Cardiac Surgery    SKIN CANCER EXCISION      TONSILLECTOMY AND ADENOIDECTOMY      UPPER GASTROINTESTINAL ENDOSCOPY       Social History     Tobacco Use    Smoking status: Every Day     Current packs/day: 1.00     Average packs/day: 0.6 packs/day for 100.0 years (62.5 ttl pk-yrs)     Types: Cigarettes     Passive exposure: Current    Smokeless tobacco: Never    Tobacco comments:     4 cigarettes per day   Vaping Use    Vaping status: Never Used   Substance and Sexual Activity    Alcohol use: Not Currently     Comment: rare    Drug use: No    Sexual activity: Not Currently     E-Cigarette/Vaping    E-Cigarette Use Never User      E-Cigarette/Vaping Substances    Nicotine No     THC No     CBD No     Flavoring No     Other No     Unknown No      Family History   Problem Relation Age of Onset    Heart attack Father     Other Sister         bypass and vlave replacement    Stroke Paternal Uncle     Arrhythmia Neg Hx     Asthma Neg Hx     Clotting disorder Neg Hx     Fainting Neg Hx     Anuerysm Neg Hx     Hypertension Neg Hx         unsure     Hyperlipidemia Neg Hx     Heart failure Neg Hx      Social History     Tobacco Use    Smoking status: Every Day     Current packs/day: 1.00     Average packs/day: 0.6 packs/day for 100.0 years (62.5 ttl pk-yrs)     Types: Cigarettes     Passive exposure: Current    Smokeless tobacco: Never    Tobacco comments:     4 cigarettes per day   Vaping Use    Vaping status: Never Used   Substance and Sexual Activity    Alcohol use: Not Currently     Comment: rare    Drug use: No    Sexual activity: Not Currently       Current Facility-Administered Medications:     heparin (porcine) 25,000 units in 0.45% NaCl 250  mL infusion (premix), Titrated, Last Rate: 18 Units/kg/hr (12/24/24 0609)    heparin (porcine) injection 3,200 Units, Q6H PRN    heparin (porcine) injection 6,400 Units, Q6H PRN    multi-electrolyte (PLASMALYTE-A/ISOLYTE-S PH 7.4) IV solution, Continuous  Prior to Admission Medications   Prescriptions Last Dose Informant Patient Reported? Taking?   Empagliflozin (Jardiance) 10 MG TABS tablet Not Taking Self No No   Sig: Take 1 tablet (10 mg total) by mouth every morning   Patient not taking: Reported on 11/11/2024   allopurinol (ZYLOPRIM) 300 mg tablet  Self No No   Sig: TAKE 1 TABLET DAILY   amLODIPine (NORVASC) 10 mg tablet  Self No No   Sig: TAKE 1 TABLET DAILY   atorvastatin (LIPITOR) 80 mg tablet   No No   Sig: Take 1 tablet (80 mg total) by mouth daily at bedtime   bacitracin topical ointment 500 units/g topical ointment  Self No No   Sig: Apply 1 large application topically 2 (two) times a day for 7 days   Patient not taking: Reported on 11/11/2024   calcitriol (ROCALTROL) 0.25 mcg capsule  Self No No   Sig: Take 1 capsule (0.25 mcg total) by mouth daily   clopidogrel (PLAVIX) 75 mg tablet   No No   Sig: TAKE 1 TABLET DAILY   ferrous sulfate 325 (65 Fe) mg tablet  Self Yes No   Sig: Take 325 mg by mouth every other day   glimepiride (AMARYL) 1 mg tablet  Self No No   Sig: Take 1 tablet (1 mg total) by mouth daily with breakfast   hydrALAZINE (APRESOLINE) 25 mg tablet  Self No No   Sig: Take 1 tablet (25 mg total) by mouth 2 (two) times a day   lisinopril (ZESTRIL) 5 mg tablet  Self No No   Sig: Take 1 tablet (5 mg total) by mouth daily   metoprolol succinate (TOPROL-XL) 25 mg 24 hr tablet  Self No No   Sig: Take 0.5 tablets (12.5 mg total) by mouth daily   pancrelipase, Lip-Prot-Amyl, (Creon) 24,000 units   No No   Sig: TAKE 1 CAPSULE THREE TIMES A DAY WITH MEALS   pantoprazole (PROTONIX) 40 mg tablet  Self No No   Sig: Take 1 tablet (40 mg total) by mouth 2 (two) times a day      Facility-Administered  Medications: None     Patient has no known allergies.    Objective :  Temp:  [97.5 °F (36.4 °C)] 97.5 °F (36.4 °C)  HR:  [74-87] 87  BP: (151-220)/(67-88) 151/67  Resp:  [20] 20  SpO2:  [95 %-100 %] 95 %  O2 Device: None (Room air)    Physical Exam:  GENERAL APPEARANCE: well developed, well nourished, overweight male in moderate discomfort. Lying in stretcher.  HEENT: NCAT; EOMI; normal external nose & ears; dry mucous membranes  NECK: Supple, normal ROM.  CARDIOVASCULAR: Regular rate. Grossly well perfused.  LUNGS/CHEST: Symmetric chest rise/fall with respirations.  ABD: Soft; protuberant abdomen, non-distended; right-sided tender in upper and lower quadrants, non-peritonitic however +guarding, -rebound. No fluid wave. No hernias observed.  EXT: Normal ROM. No observable deformities in 4/4 extremities. 2+ palpable femoral pulses.  NEURO: AAOx4. No focal neurologic deficits. Distally neurovascularly intact.  SKIN: Warm, dry and well perfused; no rash; no jaundice.      Lab Results: I have reviewed the following results:  Recent Labs     12/24/24  0128 12/24/24  0142 12/24/24  0603   WBC 11.11*  --   --    HGB 12.2  --   --    HCT 36.6  --   --      --   --    SODIUM 140  --   --    K 3.6  --   --      --   --    CO2 23  --   --    BUN 41*  --   --    CREATININE 2.08*  --   --    GLUC 200*  --   --    AST 13  --   --    ALT 7  --   --    ALB 4.3  --   --    TBILI 0.42  --   --    ALKPHOS 80  --   --    PTT  --   --  28   INR  --   --  1.02   LACTICACID  --  1.0  --      Imaging Results Review: I reviewed radiology reports from this admission including: CT abdomen/pelvis.  CTA abdomen pelvis w wo contrast   Final Result      Moderate to severe atherosclerosis, no large vessel occlusion is seen. The large branches of the aorta appear patent.      Status post right hemicolectomy. There is moderate wall thickening involving several small bowel loops adjacent to the anastomosis (axial image 71, series 310),  for example. Surrounding fat stranding in this region. Questioned pneumatosis involving some    of these thickened bowel loops. Portal venous gas redemonstrated as described. Findings taken together are highly suspicious for bowel ischemia in this region.      No evidence of bowel obstruction.      Scattered pulmonary emphysematous changes, pancreatic atrophy with multiple pancreatic calcifications suggesting chronic pancreatitis, renal cysts, nephrolithiasis, and other findings as above.      The study was marked in EPIC for immediate notification.         Workstation performed: [id number]         CT abdomen pelvis wo contrast   Final Result      Findings as above highly concerning for mesenteric and/or bowel ischemia.   No definite bowel wall pneumatosis or free intraperitoneal air identified.   Urgent surgical/critical care consultation advised.      Aforementioned findings were discussed with Dr. Knowles at 3:14 a.m. on 12/24/2024.      Workstation performed: [id number]           Other Study Results Review: No additional pertinent studies reviewed.    VTE Pharmacologic Prophylaxis: VTE covered by:  heparin (porcine), Intravenous, 18 Units/kg/hr at 12/24/24 0609  heparin (porcine), Intravenous  heparin (porcine), Intravenous    VTE Mechanical Prophylaxis: sequential compression device

## 2024-12-24 NOTE — PROGRESS NOTES
Or up on floor to take patient down; dr contreras at bedside; orders rec'd to stop heparin gtt and start ivfs; heparin gtt stopped per dr contreras orders

## 2024-12-24 NOTE — OP NOTE
OPERATIVE REPORT  PATIENT NAME: Jay Roach Jr.    :  1945  MRN: [id number]  Pt Location: [facility] HYBRID OR ROOM 02    SURGERY DATE: 2024    Surgeons and Role:  Panel 1:     * Alvin Flores MD - Primary     * Jb Samuel MD - Assisting  Panel 2:     * Eagle Higuera MD - Primary     * Kasie Cardoso MD - Assisting    Preop Diagnosis:  Mesenteric ischemia (HCC) [K55.9]    Post-Op Diagnosis Codes:     * Mesenteric ischemia (HCC) [K55.9]    Procedure(s):  EXPLORATORY LAPAROTOMY. LYSIS OF ADHESIONS. ABDOMEN CLOSURE  OPEN MESENTERIC STENTING  ARTERIOGRAM    Specimen(s):  * No specimens in log *    Estimated Blood Loss:   500 mL    Drains:  NG/OG/Enteral Tube Nasogastric 18 Fr Left nare (Active)   Placement Reverification Auscultation 24 1630   Site Assessment Clean;Dry;Intact 24 1630   Status Suction-low continuous 24 1630   Number of days: 0       Urethral Catheter Latex 16 Fr. (Active)   Reasons to continue Urinary Catheter  Accurate I&O assessment in critically ill patients (48 hr. max) 24 1630   Goal for Removal Other (Comment) 24 1630   Site Assessment Clean;Skin intact 24 1630   Collection Container Standard drainage bag 24 1630   Securement Method Securing device (Describe) 24 1630   Number of days: 0       Anesthesia Type:   Choice    Operative Indications:  Mesenteric ischemia (HCC) [K55.9]      Operative Findings:  No bowel ischemia, no resection  > 1 hour lysis of adhesions  ROMS per vascular team  Primary abdominal closure      Complications:   None apparent    Procedure and Technique:  Patient was brought to OR and secured supine, GETA induced uneventfully. He was prepped and draped in the usual sterile fashion, timeout was held per protocol.    Upper midline laparotomy was created, entry into the abdomen was obtained sharply at an area free of overlying scar. No bowel adhered at our entry point but there was a loop of bowel  adhered to the abdominal wall inferior and to the right of midline. This was sharply dissected free without evidence of bowel injury.    All visible bowel was grossly viable. We spent greater than one hour lysing adhesions to free up the small bowel. We ran the bowel from ligament of treitz to the ileocolic anastomosis and found no evidence of ischemia, necrosis, or otherwise unhealthy / nonviable / threatened bowel.    Please see vascular surgery op report for their portion of the procedure.    After conclusion of vascular surgery portion of the case, we examined the abdomen. Small amount of bleeding from the RLQ at the adhesiolysis site was controlled with direct pressure and surgicel. Several small omental bleeders were controlled with cautery. There were some small areas of coagulopathic bleeding consistent with heparin effect that stopped with direct pressure. Abdomen was copiously irrigated until the effluent ran clear. Entire surgical field was re-examined, hemostasis was excellent.    Fascia was closed with running #1 PDS, skin was closed with staples and dressed with Mepilex.       Patient was accompanied to the recovery area in good condition.      Dr. Flores was scrubbed for the entire general surgery portion of the case.    Patient Disposition:  PACU     This procedure was not performed to treat colon cancer through resection        SIGNATURE: Jb Samuel MD  DATE: December 24, 2024  TIME: 5:19 PM

## 2024-12-24 NOTE — CONSULTS
Reason for Consult / Principal Problem: Preop risk assessment    Physician Requesting Consult:  Bernard Evans,*    Cardiologist: Dr Sanchez      Assessment and Plan      Current Problem List   Principal Problem:    Mesenteric ischemia (HCC)    Assessment/Plan:    This is a 79-year-old male who is presenting with 1 week of worsening abdominal pain and was found to have mesenteric ischemia.  Patient is going to the OR emergently for diagnostic lap, possible ex lap, possible bowel resection with general and vascular surgery.  Cardiology has been consulted for preoperative risk assessment.      # Preoperative risk assessment  Calculated RCRI score of 3 points which is equivalent to 15% risk of major cardiac events preoperatively.  Risk factors: CAD/CABG, CKD, PAD, Carotid disease  Intermediate to high risk surgery.  Functional status: >4 METs  ACS Nsqip risk score: 7.4% risk of cardiac events.     # History of CAD status post CABG: LIMA to LAD and SVG to OM patent   # PCI to RCA in 2022 pre-TAVR  # Hypertension  # Bilateral carotid disease  # Dyslipidemia  # PAD status post bilateral revascularization  # AS s/p TAVR in 2022: 29MM IRVIN KYLER S3 ULTRA VALVE   # CKD stage 4  # History of GI bleed status post clipping: Aspirin stopped.    LVEF 65% on TTE from 2023.  Normal functioning TAVR valve.    Plan:    Overall patient is at high risk for given intermediate to high risk urgent surgery.  Patient understood the risk.  No further preop cardiac workup needed.  Denies any angina and not in heart failure.  Currently on IV heparin for mesenteric ischemia.  Rest of the care per primary team.                Subjective     CC: Preoperative risk assessment    HPI: Jay Roach Jr. 79 y.o. year old male who presents with abdominal pain for 1 week.  Patient initially presented to [facility] with 1 week of abdominal pain.  He also admits to having associated nausea and vomiting.  Pain  progressively got worse over the 1 week which prompted him to come to ER for further evaluation.  CTA on admission showed significant fat stranding and portal vein gas highly suspicious for mesenteric ischemia.  Patient was emergently transferred to [facility] for general surgery and vascular surgery evaluation.  Cardiology has been consulted for preoperative risk assessment.  On my evaluation, patient is alert and oriented x 3.  He follows with cardiology outpatient for his multiple cardiovascular problems.  Admits to being compliant with his medications.  Denies any active or recent angina.  No signs of volume overload on exam.  Labs overall unremarkable/at baseline.      Family History: Family history non-contributory  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 ([facility]) 09/10/2021    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 ([facility]) 02/24/2023    Basal cell carcinoma     right cheek    CAD (coronary artery disease)     Carotid stenosis, asymptomatic, bilateral     Chronic kidney disease     Colon polyp     Dependence on respirator (ventilator) status ([facility]) 04/07/2023    Diabetes ([facility])     type 2, non-insulin dependent    Diabetes mellitus ([facility])     GERD (gastroesophageal reflux disease)     History of nephrolithiasis     Hyperlipidemia     Hypertension     Left foot pain 11/30/2022    Lung cancer ([facility])     PAD (peripheral artery disease) ([facility])     Severe aortic stenosis     Squamous cell skin cancer 11/22/2022    left superior helix     Past Surgical History:   Procedure Laterality Date    APPENDECTOMY      CARDIAC CATHETERIZATION N/A 05/05/2022    Procedure: CARDIAC RHC/LHC;  Surgeon: Arvin Sanchez DO;  Location: [facility];  Service: Cardiology    CARDIAC CATHETERIZATION N/A 05/05/2022    Procedure: Cardiac Coronary Angiogram;  Surgeon: Arvin  DO Laura;  Location: [facility];  Service: Cardiology    CARDIAC CATHETERIZATION N/A 05/24/2022    Procedure: Cardiac pci;  Surgeon: Damien Jeter MD;  Location: [facility];  Service: Cardiology    CARDIAC CATHETERIZATION N/A 06/14/2022    Procedure: CARDIAC TAVR;  Surgeon: Nahum Vaz MD;  Location: [facility];  Service: Cardiology    COLECTOMY      COLONOSCOPY  2013    CORONARY ARTERY BYPASS GRAFT  2013    X 2    FEMORAL ARTERY - POPLITEAL ARTERY BYPASS GRAFT      HEMORRHOID SURGERY      IR AORTAGRAM WITH RUN-OFF  11/19/2018    IR AORTAGRAM WITH RUN-OFF  03/02/2023    IR BIOPSY LUNG  4/17/2024    IR LOWER EXTREMITY ANGIOGRAM  03/23/2023    MOHS SURGERY Left 01/11/2023    SCC left superior helix-Dr Guy    OK SLCTV CATHJ 3RD+ ORD SLCTV ABDL PEL/LXTR BRNCH Left 08/12/2016    Procedure: LEFT FEMORAL ARTERIOGRAM; BALLOON ANGIOPLASTY; SFA  AND FEMORAL AT VEIN GRAFT;  Surgeon: Nicholas Urena MD;  Location: [facility];  Service: Vascular    OK TEAEC W/WO PATCH GRAFT COMMON FEMORAL Right 03/23/2023    Procedure: Common femoral endarterectomy&antegrade intervention of SFA, popliteal artery w/ shockwave;  Surgeon: Eagle Higuera MD;  Location: [facility];  Service: Vascular    OK TRANSCATHETER TRANSAPICAL REPLACEMT AORTIC VALVE N/A 06/14/2022    Procedure: REPLACEMENT AORTIC VALVE TRANSCATHETER (TAVR) TRANSAPICAL 29MM IRVIN KYLER S3 ULTRA VALVE(ACCESS ON LEFT) ELANA;  Surgeon: Amarjit Gordon DO;  Location: [facility];  Service: Cardiac Surgery    SKIN CANCER EXCISION      TONSILLECTOMY AND ADENOIDECTOMY      UPPER GASTROINTESTINAL ENDOSCOPY       Social History   Social History     Substance and Sexual Activity   Alcohol Use Not Currently    Comment: rare     Social History     Substance and Sexual Activity   Drug Use No     Social History     Tobacco Use   Smoking Status Every Day    Current packs/day: 1.00    Average packs/day: 0.6 packs/day for 100.0 years (62.5 ttl pk-yrs)    Types:  Cigarettes    Passive exposure: Current   Smokeless Tobacco Never   Tobacco Comments    4 cigarettes per day     Family History:   Family History   Problem Relation Age of Onset    Heart attack Father     Other Sister         bypass and vlave replacement    Stroke Paternal Uncle     Arrhythmia Neg Hx     Asthma Neg Hx     Clotting disorder Neg Hx     Fainting Neg Hx     Anuerysm Neg Hx     Hypertension Neg Hx         unsure     Hyperlipidemia Neg Hx     Heart failure Neg Hx        Review of Systems:  Review of Systems   Constitutional:  Negative for activity change, appetite change, chills and diaphoresis.   Respiratory:  Negative for apnea, cough, chest tightness and shortness of breath.    Cardiovascular:  Negative for chest pain, palpitations and leg swelling.   Gastrointestinal:  Positive for abdominal pain, nausea and vomiting. Negative for abdominal distention, constipation and diarrhea.           Scheduled Meds:  Current Facility-Administered Medications   Medication Dose Route Frequency Provider Last Rate    heparin (porcine)  3-30 Units/kg/hr (Order-Specific) Intravenous Titrated Jb Samuel MD 18 Units/kg/hr (12/24/24 0945)    heparin (porcine)  3,200 Units Intravenous Q6H PRN Jb Samuel MD      heparin (porcine)  6,400 Units Intravenous Once Jb Samuel MD      heparin (porcine)  6,400 Units Intravenous Q6H PRN Jb Samuel MD      HYDROmorphone  0.5 mg Intravenous Q3H PRN Jb Samuel MD      HYDROmorphone  0.2 mg Intravenous Q3H PRN Jb Samuel MD      lactated ringers  125 mL/hr Intravenous Continuous Renee Wooten PA-C      nicotine  1 patch Transdermal Daily Renee Wooten PA-C      ondansetron  4 mg Intravenous Q6H PRN Renee Wooten PA-C       Continuous Infusions:heparin (porcine), 3-30 Units/kg/hr (Order-Specific), Last Rate: 18 Units/kg/hr (12/24/24 0945)  lactated ringers, 125 mL/hr      PRN Meds:.  heparin (porcine)    heparin (porcine)    HYDROmorphone     HYDROmorphone    ondansetron  all current active meds have been reviewed    No Known Allergies    Objective   Vitals: Temp (24hrs), Av.7 °F (36.5 °C), Min:97.5 °F (36.4 °C), Max:97.8 °F (36.6 °C)  Current: Temperature: 97.8 °F (36.6 °C)  Patient Vitals for the past 24 hrs:   BP Temp Pulse Resp SpO2   24 0815 (!) 176/76 97.8 °F (36.6 °C) 80 20 94 %    There is no height or weight on file to calculate BMI.  Orthostatic Blood Pressures      Flowsheet Row Most Recent Value   Blood Pressure 176/76 filed at 2024 0815                Invasive Devices       Peripheral Intravenous Line  Duration             Peripheral IV 24 Dorsal (posterior);Right Forearm <1 day    Peripheral IV 24 Right Antecubital <1 day                    Physical Exam:  Physical Exam  Vitals reviewed.   Constitutional:       General: He is not in acute distress.     Appearance: He is well-developed. He is not diaphoretic.   Cardiovascular:      Rate and Rhythm: Normal rate and regular rhythm.      Heart sounds: Normal heart sounds. No murmur heard.     No friction rub.   Pulmonary:      Effort: Pulmonary effort is normal. No respiratory distress.      Breath sounds: Normal breath sounds. No stridor. No wheezing.   Psychiatric:         Mood and Affect: Mood normal.         Behavior: Behavior normal.         Thought Content: Thought content normal.         Judgment: Judgment normal.             Lab Results:   Results from last 7 days   Lab Units 24  0128 24  1227   WBC Thousand/uL 11.11* 7.08   HEMOGLOBIN g/dL 12.2 10.8*   HEMATOCRIT % 36.6 33.5*   PLATELETS Thousands/uL 186 145*   SEGS PCT % 86* 76*   MONO PCT % 3* 7   EOS PCT % 2 3      Results from last 7 days   Lab Units 24  0603 24  0128 24  1227   SODIUM mmol/L  --  140 139   POTASSIUM mmol/L  --  3.6 3.8   CHLORIDE mmol/L  --  105 104   CO2 mmol/L  --   24   BUN mg/dL  --  41* 46*   CREATININE mg/dL  --  2.08* 2.58*   CALCIUM mg/dL  --   "9.9 9.4   ALK PHOS U/L  --  80 63   ALT U/L  --  7 9   AST U/L  --  13 12*   INR  1.02  --   --    PTT seconds 28  --   --    EGFR ml/min/1.73sq m  --  28 22     Results from last 7 days   Lab Units 12/24/24  0603   INR  1.02   PTT seconds 28     Results from last 7 days   Lab Units 12/24/24  0142   LACTIC ACID mmol/L 1.0         No results found for: \"PHART\", \"MIE0EQI\", \"PO2ART\", \"KLO3JXT\", \"T2IVFBUP\", \"BEART\", \"SOURCE\"  No components found for: \"HIV1X2\"  Lab Results   Component Value Date    HEPCAB Non-reactive 09/24/2021     No results found for: \"SPEP\", \"UPEP\"   Lab Results   Component Value Date    HGBA1C 7.3 (H) 09/04/2024    HGBA1C 6.6 (H) 03/05/2024    HGBA1C 6.1 (H) 12/09/2023     No results found for: \"CHOL\"   Lab Results   Component Value Date    HDL 52 05/05/2022    HDL 31 (L) 06/30/2021    HDL 39 (L) 09/15/2020      Lab Results   Component Value Date    LDLCALC 33 05/05/2022    LDLCALC 36 06/30/2021    LDLCALC 36 09/15/2020      Lab Results   Component Value Date    TRIG 50 05/05/2022    TRIG 90 06/30/2021    TRIG 85 09/15/2020     No components found for: \"PROCAL\"          Imaging: Results Review Statement: No pertinent imaging studies reviewed.               "

## 2024-12-24 NOTE — ASSESSMENT & PLAN NOTE
Observed on non-con CTAP 12/24. Redemonstrated on CTA abd/pelvis alongside fat stranding and portal venous gas highly suspicious for mesenteric ischemia.  Patient presenting with 1 week history of right-sided abdominal pain associated with nausea and nonbilious/nonbloody emesis.  Lactate = 1.0.  WBC = 11.1. Patient s/p distant R hemicolectomy following acute perforated appendicitis.    -NPO  -isolyte @50 (kidney protection)  -due to concerning constellation of radiographic findings, vascular surgery was consulted for further recommendations   -patient received IV bolus of isolyte for CT contrast given history of CKD   -initiation of heparin gtt (loading dose + maintenance)   -transfer to [facility] for management and coordination of care between general & vascular surgical services  -serial abdominal exams  -patient accepted by Dr. Evans at [facility] to be admitted to [facility] service

## 2024-12-24 NOTE — ANESTHESIA POSTPROCEDURE EVALUATION
Post-Op Assessment Note    CV Status:  Stable  Pain Score: 0    Pain management: adequate       Mental Status:  Awake and alert   Hydration Status:  Stable   PONV Controlled:  None   Airway Patency:  Patent     Post Op Vitals Reviewed: Yes    No anethesia notable event occurred.    Staff: CRNA           Last Filed PACU Vitals:  Vitals Value Taken Time   Temp 98.9    Pulse 87 12/24/24 1627   /72 12/24/24 1626   Resp 36 12/24/24 1627   SpO2 93 % 12/24/24 1627   Vitals shown include unfiled device data.    Modified Kitty:  No data recorded

## 2024-12-25 LAB
2HR DELTA HS TROPONIN: -10 NG/L
4HR DELTA HS TROPONIN: -5 NG/L
ABO GROUP BLD BPU: NORMAL
ABO GROUP BLD BPU: NORMAL
ANION GAP SERPL CALCULATED.3IONS-SCNC: 6 MMOL/L (ref 4–13)
BASOPHILS # BLD AUTO: 0.01 THOUSANDS/ÂΜL (ref 0–0.1)
BASOPHILS NFR BLD AUTO: 0 % (ref 0–1)
BPU ID: NORMAL
BPU ID: NORMAL
BUN SERPL-MCNC: 35 MG/DL (ref 5–25)
CALCIUM SERPL-MCNC: 8.4 MG/DL (ref 8.4–10.2)
CARDIAC TROPONIN I PNL SERPL HS: 102 NG/L (ref ?–50)
CARDIAC TROPONIN I PNL SERPL HS: 107 NG/L (ref ?–50)
CARDIAC TROPONIN I PNL SERPL HS: 97 NG/L (ref ?–50)
CFFMA (FUNCTIONAL FIBRINOGEN MAX AMPLITUDE): 27.4 MM (ref 15–32)
CHLORIDE SERPL-SCNC: 112 MMOL/L (ref 96–108)
CKLY30: 0.1 % (ref 0–2.6)
CKR(REACTION TIME): 8 MIN (ref 4.6–9.1)
CO2 SERPL-SCNC: 24 MMOL/L (ref 21–32)
CREAT SERPL-MCNC: 1.84 MG/DL (ref 0.6–1.3)
CRTMA(RAPIDTEG MAX AMPLITUDE): 63.6 MM (ref 52–70)
EOSINOPHIL # BLD AUTO: 0 THOUSAND/ÂΜL (ref 0–0.61)
EOSINOPHIL NFR BLD AUTO: 0 % (ref 0–6)
ERYTHROCYTE [DISTWIDTH] IN BLOOD BY AUTOMATED COUNT: 15.1 % (ref 11.6–15.1)
GFR SERPL CREATININE-BSD FRML MDRD: 33 ML/MIN/1.73SQ M
GLUCOSE SERPL-MCNC: 120 MG/DL (ref 65–140)
GLUCOSE SERPL-MCNC: 127 MG/DL (ref 65–140)
GLUCOSE SERPL-MCNC: 133 MG/DL (ref 65–140)
GLUCOSE SERPL-MCNC: 141 MG/DL (ref 65–140)
GLUCOSE SERPL-MCNC: 141 MG/DL (ref 65–140)
HCT VFR BLD AUTO: 25.5 % (ref 36.5–49.3)
HGB BLD-MCNC: 8.3 G/DL (ref 12–17)
HGB BLD-MCNC: 8.4 G/DL (ref 12–17)
IMM GRANULOCYTES # BLD AUTO: 0.07 THOUSAND/UL (ref 0–0.2)
IMM GRANULOCYTES NFR BLD AUTO: 1 % (ref 0–2)
LACTATE SERPL-SCNC: 0.9 MMOL/L (ref 0.5–2)
LYMPHOCYTES # BLD AUTO: 0.56 THOUSANDS/ÂΜL (ref 0.6–4.47)
LYMPHOCYTES NFR BLD AUTO: 5 % (ref 14–44)
MAGNESIUM SERPL-MCNC: 2.1 MG/DL (ref 1.9–2.7)
MCH RBC QN AUTO: 31.6 PG (ref 26.8–34.3)
MCHC RBC AUTO-ENTMCNC: 32.9 G/DL (ref 31.4–37.4)
MCV RBC AUTO: 96 FL (ref 82–98)
MONOCYTES # BLD AUTO: 0.81 THOUSAND/ÂΜL (ref 0.17–1.22)
MONOCYTES NFR BLD AUTO: 6 % (ref 4–12)
NEUTROPHILS # BLD AUTO: 11.13 THOUSANDS/ÂΜL (ref 1.85–7.62)
NEUTS SEG NFR BLD AUTO: 88 % (ref 43–75)
NRBC BLD AUTO-RTO: 0 /100 WBCS
PHOSPHATE SERPL-MCNC: 3.6 MG/DL (ref 2.3–4.1)
PLATELET # BLD AUTO: 130 THOUSANDS/UL (ref 149–390)
PMV BLD AUTO: 10.6 FL (ref 8.9–12.7)
POTASSIUM SERPL-SCNC: 4.3 MMOL/L (ref 3.5–5.3)
RBC # BLD AUTO: 2.66 MILLION/UL (ref 3.88–5.62)
SODIUM SERPL-SCNC: 142 MMOL/L (ref 135–147)
UNIT DISPENSE STATUS: NORMAL
UNIT DISPENSE STATUS: NORMAL
UNIT PRODUCT CODE: NORMAL
UNIT PRODUCT CODE: NORMAL
UNIT PRODUCT VOLUME: 280 ML
UNIT PRODUCT VOLUME: 280 ML
UNIT RH: NORMAL
UNIT RH: NORMAL
WBC # BLD AUTO: 12.58 THOUSAND/UL (ref 4.31–10.16)

## 2024-12-25 PROCEDURE — 80048 BASIC METABOLIC PNL TOTAL CA: CPT

## 2024-12-25 PROCEDURE — P9017 PLASMA 1 DONOR FRZ W/IN 8 HR: HCPCS

## 2024-12-25 PROCEDURE — 85576 BLOOD PLATELET AGGREGATION: CPT

## 2024-12-25 PROCEDURE — 83605 ASSAY OF LACTIC ACID: CPT

## 2024-12-25 PROCEDURE — 99232 SBSQ HOSP IP/OBS MODERATE 35: CPT | Performed by: SURGERY

## 2024-12-25 PROCEDURE — 85384 FIBRINOGEN ACTIVITY: CPT

## 2024-12-25 PROCEDURE — 84100 ASSAY OF PHOSPHORUS: CPT

## 2024-12-25 PROCEDURE — 85347 COAGULATION TIME ACTIVATED: CPT

## 2024-12-25 PROCEDURE — 99024 POSTOP FOLLOW-UP VISIT: CPT | Performed by: SURGERY

## 2024-12-25 PROCEDURE — 85018 HEMOGLOBIN: CPT

## 2024-12-25 PROCEDURE — 83735 ASSAY OF MAGNESIUM: CPT

## 2024-12-25 PROCEDURE — 85397 CLOTTING FUNCT ACTIVITY: CPT

## 2024-12-25 PROCEDURE — 85025 COMPLETE CBC W/AUTO DIFF WBC: CPT

## 2024-12-25 PROCEDURE — 84484 ASSAY OF TROPONIN QUANT: CPT

## 2024-12-25 PROCEDURE — 99232 SBSQ HOSP IP/OBS MODERATE 35: CPT | Performed by: INTERNAL MEDICINE

## 2024-12-25 PROCEDURE — 30233K1 TRANSFUSION OF NONAUTOLOGOUS FROZEN PLASMA INTO PERIPHERAL VEIN, PERCUTANEOUS APPROACH: ICD-10-PCS | Performed by: EMERGENCY MEDICINE

## 2024-12-25 PROCEDURE — 99232 SBSQ HOSP IP/OBS MODERATE 35: CPT

## 2024-12-25 PROCEDURE — 82948 REAGENT STRIP/BLOOD GLUCOSE: CPT

## 2024-12-25 RX ORDER — SODIUM CHLORIDE, SODIUM GLUCONATE, SODIUM ACETATE, POTASSIUM CHLORIDE, MAGNESIUM CHLORIDE, SODIUM PHOSPHATE, DIBASIC, AND POTASSIUM PHOSPHATE .53; .5; .37; .037; .03; .012; .00082 G/100ML; G/100ML; G/100ML; G/100ML; G/100ML; G/100ML; G/100ML
1000 INJECTION, SOLUTION INTRAVENOUS ONCE
Status: DISCONTINUED | OUTPATIENT
Start: 2024-12-25 | End: 2024-12-25

## 2024-12-25 RX ORDER — SODIUM CHLORIDE, SODIUM GLUCONATE, SODIUM ACETATE, POTASSIUM CHLORIDE, MAGNESIUM CHLORIDE, SODIUM PHOSPHATE, DIBASIC, AND POTASSIUM PHOSPHATE .53; .5; .37; .037; .03; .012; .00082 G/100ML; G/100ML; G/100ML; G/100ML; G/100ML; G/100ML; G/100ML
500 INJECTION, SOLUTION INTRAVENOUS ONCE
Status: COMPLETED | OUTPATIENT
Start: 2024-12-25 | End: 2024-12-25

## 2024-12-25 RX ORDER — ASPIRIN 300 MG/1
300 SUPPOSITORY RECTAL DAILY
Status: DISCONTINUED | OUTPATIENT
Start: 2024-12-25 | End: 2024-12-26

## 2024-12-25 RX ADMIN — METOROPROLOL TARTRATE 2.5 MG: 5 INJECTION, SOLUTION INTRAVENOUS at 23:45

## 2024-12-25 RX ADMIN — LABETALOL HYDROCHLORIDE 10 MG: 5 INJECTION, SOLUTION INTRAVENOUS at 13:05

## 2024-12-25 RX ADMIN — SODIUM CHLORIDE, SODIUM GLUCONATE, SODIUM ACETATE, POTASSIUM CHLORIDE, MAGNESIUM CHLORIDE, SODIUM PHOSPHATE, DIBASIC, AND POTASSIUM PHOSPHATE 125 ML/HR: .53; .5; .37; .037; .03; .012; .00082 INJECTION, SOLUTION INTRAVENOUS at 14:39

## 2024-12-25 RX ADMIN — PANTOPRAZOLE SODIUM 40 MG: 40 INJECTION, POWDER, FOR SOLUTION INTRAVENOUS at 07:58

## 2024-12-25 RX ADMIN — NICOTINE 1 PATCH: 14 PATCH, EXTENDED RELEASE TRANSDERMAL at 08:00

## 2024-12-25 RX ADMIN — HEPARIN SODIUM 5000 UNITS: 5000 INJECTION, SOLUTION INTRAVENOUS; SUBCUTANEOUS at 13:05

## 2024-12-25 RX ADMIN — METOROPROLOL TARTRATE 2.5 MG: 5 INJECTION, SOLUTION INTRAVENOUS at 07:56

## 2024-12-25 RX ADMIN — SODIUM CHLORIDE, SODIUM GLUCONATE, SODIUM ACETATE, POTASSIUM CHLORIDE, MAGNESIUM CHLORIDE, SODIUM PHOSPHATE, DIBASIC, AND POTASSIUM PHOSPHATE 500 ML: .53; .5; .37; .037; .03; .012; .00082 INJECTION, SOLUTION INTRAVENOUS at 03:29

## 2024-12-25 RX ADMIN — SODIUM CHLORIDE, SODIUM GLUCONATE, SODIUM ACETATE, POTASSIUM CHLORIDE, MAGNESIUM CHLORIDE, SODIUM PHOSPHATE, DIBASIC, AND POTASSIUM PHOSPHATE 125 ML/HR: .53; .5; .37; .037; .03; .012; .00082 INJECTION, SOLUTION INTRAVENOUS at 23:47

## 2024-12-25 RX ADMIN — ASPIRIN 300 MG: 300 SUPPOSITORY RECTAL at 12:11

## 2024-12-25 RX ADMIN — ACETAMINOPHEN 1000 MG: 1000 INJECTION, SOLUTION INTRAVENOUS at 05:53

## 2024-12-25 RX ADMIN — METOROPROLOL TARTRATE 2.5 MG: 5 INJECTION, SOLUTION INTRAVENOUS at 16:08

## 2024-12-25 RX ADMIN — LABETALOL HYDROCHLORIDE 10 MG: 5 INJECTION, SOLUTION INTRAVENOUS at 23:38

## 2024-12-25 RX ADMIN — HEPARIN SODIUM 5000 UNITS: 5000 INJECTION, SOLUTION INTRAVENOUS; SUBCUTANEOUS at 21:02

## 2024-12-25 RX ADMIN — METOROPROLOL TARTRATE 2.5 MG: 5 INJECTION, SOLUTION INTRAVENOUS at 01:38

## 2024-12-25 RX ADMIN — SODIUM CHLORIDE, SODIUM GLUCONATE, SODIUM ACETATE, POTASSIUM CHLORIDE, MAGNESIUM CHLORIDE, SODIUM PHOSPHATE, DIBASIC, AND POTASSIUM PHOSPHATE 125 ML/HR: .53; .5; .37; .037; .03; .012; .00082 INJECTION, SOLUTION INTRAVENOUS at 06:26

## 2024-12-25 RX ADMIN — SODIUM CHLORIDE, SODIUM GLUCONATE, SODIUM ACETATE, POTASSIUM CHLORIDE, MAGNESIUM CHLORIDE, SODIUM PHOSPHATE, DIBASIC, AND POTASSIUM PHOSPHATE 125 ML/HR: .53; .5; .37; .037; .03; .012; .00082 INJECTION, SOLUTION INTRAVENOUS at 01:25

## 2024-12-25 RX ADMIN — NICOTINE 1 PATCH: 14 PATCH, EXTENDED RELEASE TRANSDERMAL at 07:58

## 2024-12-25 NOTE — ASSESSMENT & PLAN NOTE
Patient is an 80yo M who is well known to the vascular surgery service for multiple lower extremity procedures who presented with abdominal pain. CTA revealed pneumatosis in small bowel in RLQ as well as calcified atherosclerotic stenosis in the proximal SMA and celiac artery. Also diffuse calcifications in the SMA. Patient underwent exploratory laparotomy with retrograde open SMA stent on 12/24. He is hemodynamically stable post operatively.     -If patient remains NPO, would recommend starting rectal ASA  -When patient is able to tolerate PO intake, recommend transitioning to Plavix 75mg QD.   -Will continue to monitor abdominal exam  -Rest of care per primary team

## 2024-12-25 NOTE — ASSESSMENT & PLAN NOTE
No recent angina. He has had PCI and CABG in the past. Antiplatelet agent with Plavix. When on dual antiplatelet therapy, he did have GI bleeding.    Tolerated surgery well. Resume oral cardiac medications when able to take p.o.

## 2024-12-25 NOTE — PROGRESS NOTES
Progress Note - Vascular Surgery   Name: Jay Roach Jr. 79 y.o. male I MRN: [id number]  Unit/Bed#: [facility] 515-01 I Date of Admission: 12/24/2024   Date of Service: 12/25/2024 I Hospital Day: 1    Assessment & Plan  Mesenteric ischemia (HCC)  Patient is an 78yo M who is well known to the vascular surgery service for multiple lower extremity procedures who presented with abdominal pain. CTA revealed pneumatosis in small bowel in RLQ as well as calcified atherosclerotic stenosis in the proximal SMA and celiac artery. Also diffuse calcifications in the SMA. Patient underwent exploratory laparotomy with retrograde open SMA stent on 12/24. He is hemodynamically stable post operatively.     -If patient remains NPO, would recommend starting rectal ASA  -When patient is able to tolerate PO intake, recommend transitioning to Plavix 75mg QD.   -Will continue to monitor abdominal exam  -Rest of care per primary team    24 Hour Events : No acute events. Uncomplicated post operative course since OR yesterday  Subjective : Patient reports that his abdominal pain is improved this morning. Denies nausea, vomiting, fevers, chills, chest pain, SOB. NG tube in place.     Objective :  Temp:  [97.7 °F (36.5 °C)-99 °F (37.2 °C)] 98.3 °F (36.8 °C)  HR:  [65-87] 75  BP: (113-176)/(47-76) 164/75  Resp:  [12-36] 28  SpO2:  [86 %-98 %] 92 %  O2 Device: Nasal cannula  Nasal Cannula O2 Flow Rate (L/min):  [2 L/min-3 L/min] 3 L/min     I/O         12/23 0701  12/24 0700 12/24 0701  12/25 0700 12/25 0701  12/26 0700    P.O.  0     I.V. (mL/kg)  5688.9 (72)     Blood  630     IV Piggyback  550     Total Intake(mL/kg)  6868.9 (86.9)     Urine (mL/kg/hr)  1060     Emesis/NG output  50     Blood  500     Total Output  1610     Net  +5258.9                  Lines/Drains/Airways       Active Status       Name Placement date Placement time Site Days    CVC Central Lines 12/24/24 12/24/24  1322  --  less than 1    Arterial Line 12/24/24 Right Radial  12/24/24  0058  Radial  1    NG/OG/Enteral Tube Nasogastric 18 Fr Left nare 12/24/24  1537  Left nare  less than 1    Urethral Catheter Latex 16 Fr. 12/24/24  1305  Latex  less than 1                  Physical Exam  Constitutional:       Appearance: Normal appearance.   HENT:      Head: Normocephalic and atraumatic.      Right Ear: External ear normal.      Left Ear: External ear normal.      Nose: Nose normal.      Comments: NG tube in place  Eyes:      Conjunctiva/sclera: Conjunctivae normal.   Cardiovascular:      Rate and Rhythm: Normal rate.      Comments: Appears well perfused  Pulmonary:      Effort: Pulmonary effort is normal. No respiratory distress.      Comments: Tachypnea, intermittent desaturations  Abdominal:      General: Abdomen is flat. There is no distension.      Palpations: Abdomen is soft.      Tenderness: There is abdominal tenderness (appropriate sharlene-incisional tenderness). There is no guarding.      Comments: Incision with Mepilex in place. CDI   Skin:     General: Skin is warm and dry.   Neurological:      General: No focal deficit present.      Mental Status: He is alert and oriented to person, place, and time.   Psychiatric:         Mood and Affect: Mood normal.         Behavior: Behavior normal.             Lab Results: I have reviewed the following results:  CBC with diff:   Lab Results   Component Value Date    WBC 12.58 (H) 12/25/2024    HGB 8.4 (L) 12/25/2024    HCT 25.5 (L) 12/25/2024    MCV 96 12/25/2024     (L) 12/25/2024    RBC 2.66 (L) 12/25/2024    MCH 31.6 12/25/2024    MCHC 32.9 12/25/2024    RDW 15.1 12/25/2024    MPV 10.6 12/25/2024    NRBC 0 12/25/2024   ,   BMP/CMP:  Lab Results   Component Value Date    SODIUM 142 12/25/2024    K 4.3 12/25/2024    K 4.1 11/22/2015     (H) 12/25/2024     11/22/2015    CO2 24 12/25/2024    CO2 24 12/24/2024    ANIONGAP 7 11/22/2015    BUN 35 (H) 12/25/2024    BUN 15 11/22/2015    CREATININE 1.84 (H) 12/25/2024     "CREATININE 0.90 11/22/2015    GLUCOSE 123 12/24/2024    GLUCOSE 125 11/22/2015    CALCIUM 8.4 12/25/2024    CALCIUM 8.4 11/22/2015    AST 9 (L) 12/24/2024    ALT 5 (L) 12/24/2024    ALKPHOS 56 12/24/2024    EGFR 33 12/25/2024   ,   Lipid Panel: No results found for: \"CHOL\",   Coags:   Lab Results   Component Value Date     (HH) 12/24/2024    PTT 27 10/28/2015    INR 1.29 (H) 12/24/2024    INR 1.14 10/30/2015   ,   Blood Culture: No results found for: \"BLOODCX\",   Urinalysis:   Lab Results   Component Value Date    COLORU Light Yellow 12/24/2024    CLARITYU Clear 12/24/2024    SPECGRAV 1.011 12/24/2024    PHUR 5.5 12/24/2024    LEUKOCYTESUR Negative 12/24/2024    NITRITE Negative 12/24/2024    GLUCOSEU Negative 12/24/2024    KETONESU Negative 12/24/2024    BILIRUBINUR Negative 12/24/2024    BLOODU Negative 12/24/2024   ,   Urine Culture: No results found for: \"URINECX\",   Wound Culure: No results found for: \"WOUNDCULT\"    Imaging Results Review: No pertinent imaging studies reviewed.  Other Study Results Review: No additional pertinent studies reviewed.    VTE Prophylaxis: Heparin  "

## 2024-12-25 NOTE — PROGRESS NOTES
Progress Note - Cardiology   Name: Jay Roach Jr. 79 y.o. male I MRN: [id number]  Unit/Bed#: [facility] 515-01 I Date of Admission: 12/24/2024   Date of Service: 12/25/2024 I Hospital Day: 1     Assessment & Plan  Mesenteric ischemia (HCC)  Intervention performed yesterday. Per vascular surgery. Planning to resume antiplatelet agent. He was on Plavix prior to her surgery but currently NPO.  Coronary artery disease involving native coronary artery of native heart without angina pectoris  No recent angina. He has had PCI and CABG in the past. Antiplatelet agent with Plavix. When on dual antiplatelet therapy, he did have GI bleeding.    Tolerated surgery well. Resume oral cardiac medications when able to take p.o.  S/P CABG (coronary artery bypass graft)    S/P TAVR (transcatheter aortic valve replacement)  Normal bioprosthetic valve function on his last echocardiogram  Sinus bradycardia  Brief 2-1 AV block overnight, very brief and transient. Asymptomatic. Is on a low-dose of metoprolol as an outpatient. Currently on low-dose IV metoprolol which can continue. Continue telemetry monitoring.    Subjective     Went to the OR yesterday. He had intervention performed.    He is n.p.o. He has an NG tube in place.  Denies any chest pain. Appropriate abdominal pain.    Overnight, he did have brief 2-1 AV block on telemetry. Asymptomatic.    Objective :  Temp:  [97.7 °F (36.5 °C)-99 °F (37.2 °C)] 98.3 °F (36.8 °C)  HR:  [65-87] 68  BP: (113-175)/(47-75) 164/72  Resp:  [12-36] 15  SpO2:  [86 %-98 %] 93 %  O2 Device: Nasal cannula  Nasal Cannula O2 Flow Rate (L/min):  [2 L/min-3 L/min] 3 L/min  Orthostatic Blood Pressures      Flowsheet Row Most Recent Value   Blood Pressure 164/72 filed at 12/25/2024 0800   Patient Position - Orthostatic VS Lying filed at 12/25/2024 0330          First Weight: Weight - Scale: 77.8 kg (171 lb 8.3 oz) (12/24/24 1032)  Vitals:    12/24/24 1032 12/25/24 0600   Weight: 77.8 kg (171 lb 8.3 oz) 79 kg  (174 lb 2.6 oz)     Physical Exam  Constitutional:       General: He is not in acute distress.     Appearance: He is well-developed.   HENT:      Head:      Comments: NG tube  Eyes:      General: No scleral icterus.     Conjunctiva/sclera: Conjunctivae normal.   Neck:      Vascular: No JVD.   Cardiovascular:      Rate and Rhythm: Normal rate and regular rhythm.      Heart sounds: Murmur heard.      No friction rub. No gallop.      Comments: Bio avr  Pulmonary:      Effort: Pulmonary effort is normal.      Breath sounds: Normal breath sounds. No wheezing or rales.   Chest:      Chest wall: No tenderness.   Abdominal:      General: A surgical scar is present.      Tenderness: There is abdominal tenderness.   Musculoskeletal:         General: Normal range of motion.      Cervical back: Normal range of motion and neck supple.   Skin:     General: Skin is warm and dry.      Findings: No erythema or rash.   Neurological:      Mental Status: He is alert and oriented to person, place, and time.   Psychiatric:         Behavior: Behavior normal.           Lab Results: I have reviewed the following results:CBC/BMP:   .     12/24/24  1636 12/24/24 2103 12/25/24  0323 12/25/24  0342   WBC 16.68*  --  12.58*  --    HGB 9.2*   < > 8.4*  --    HCT 28.1*  --  25.5*  --    *  --  130*  --    SODIUM 142  --   --  142   K 3.7  --   --  4.3   *  --   --  112*   CO2 21  --   --  24   BUN 36*  --   --  35*   CREATININE 1.75*  --   --  1.84*   GLUC 179*  --   --  141*   CAIONIZED 1.08*  --   --   --    MG 1.4*  --   --  2.1   PHOS 3.3  --   --  3.6    < > = values in this interval not displayed.      Results from last 7 days   Lab Units 12/25/24  0323 12/24/24  2103 12/24/24  1636 12/24/24  1527 12/24/24  1408 12/24/24  0128   WBC Thousand/uL 12.58*  --  16.68*  --   --  11.11*   HEMOGLOBIN g/dL 8.4* 9.1* 9.2*  --   --  12.2   I STAT HEMOGLOBIN g/dl  --   --   --  8.5*   < >  --    HEMATOCRIT % 25.5*  --  28.1*  --   --  36.6  "  HEMATOCRIT, ISTAT %  --   --   --  25*   < >  --    PLATELETS Thousands/uL 130*  --  147*  --   --  186    < > = values in this interval not displayed.     Results from last 7 days   Lab Units 12/25/24  0342 12/24/24  1636 12/24/24  1527 12/24/24  1408 12/24/24  0128   POTASSIUM mmol/L 4.3 3.7  --   --  3.6   CHLORIDE mmol/L 112* 113*  --   --  105   CO2 mmol/L 24 21  --   --  23   CO2, I-STAT mmol/L  --   --  24   < >  --    BUN mg/dL 35* 36*  --   --  41*   CREATININE mg/dL 1.84* 1.75*  --   --  2.08*   GLUCOSE, ISTAT mg/dl  --   --  123   < >  --    CALCIUM mg/dL 8.4 7.7*  --   --  9.9    < > = values in this interval not displayed.     Results from last 7 days   Lab Units 12/24/24  1636 12/24/24  0603   INR  1.29* 1.02   PTT seconds 132* 28     Lab Results   Component Value Date    HGBA1C 6.6 (H) 12/24/2024     No results found for: \"CKTOTAL\", \"CKMB\", \"CKMBINDEX\", \"TROPONINI\"    "

## 2024-12-25 NOTE — ASSESSMENT & PLAN NOTE
Brief 2-1 AV block overnight, very brief and transient. Asymptomatic. Is on a low-dose of metoprolol as an outpatient. Currently on low-dose IV metoprolol which can continue. Continue telemetry monitoring.

## 2024-12-25 NOTE — QUICK NOTE
"Post Op Check Note - Surgery Resident  Jay Roach Jr. 79 y.o. male MRN: [id number]  Unit/Bed#: [facility] 515-01 Encounter: [id number]    ASSESSMENT:  Jay Roach Jr. is a 79 y.o. male who is s/p retrograde mesenteric stent, mesenteric angiogram    Subjective: Pain well controlled. No n/v. No f/c. Voiding.    Physical Exam:  GEN: NAD  CV: RRR  Lung: Normal effort  Ab: Soft, ND, tender generalized  Neuro: A+Ox3  Incisions: CDI    Blood pressure 166/74, pulse 76, temperature 98.9 °F (37.2 °C), temperature source Oral, resp. rate 13, height 5' 10\" (1.778 m), weight 77.8 kg (171 lb 8.3 oz), SpO2 94%.,Body mass index is 24.61 kg/m².      Intake/Output Summary (Last 24 hours) at 12/24/2024 2326  Last data filed at 12/24/2024 2300  Gross per 24 hour   Intake 4955.58 ml   Output 985 ml   Net 3970.58 ml       Invasive Devices       Central Venous Catheter Line  Duration             CVC Central Lines 12/24/24 <1 day              Peripheral Intravenous Line  Duration             Peripheral IV 12/24/24 Dorsal (posterior);Right Forearm <1 day    Peripheral IV 12/24/24 Right Antecubital <1 day              Arterial Line  Duration             Arterial Line 12/24/24 Right Radial <1 day              Drain  Duration             NG/OG/Enteral Tube Nasogastric 18 Fr Left nare <1 day    Urethral Catheter Latex 16 Fr. <1 day                    VTE Pharmacologic Prophylaxis: VTE covered by:  heparin (porcine), Subcutaneous             Diet Orders   (From admission, onward)                 Start     Ordered    12/24/24 1113  Diet NPO  Diet effective now        References:    Adult Nutrition Support Algorithm    RD Therapeutic Diet Order Protocol   Question:  Diet Type  Answer:  NPO    12/24/24 1112                            "

## 2024-12-25 NOTE — PLAN OF CARE
Problem: PAIN - ADULT  Goal: Verbalizes/displays adequate comfort level or baseline comfort level  Description: Interventions:  - Encourage patient to monitor pain and request assistance  - Assess pain using appropriate pain scale  - Administer analgesics based on type and severity of pain and evaluate response  - Implement non-pharmacological measures as appropriate and evaluate response  - Consider cultural and social influences on pain and pain management  - Notify physician/advanced practitioner if interventions unsuccessful or patient reports new pain  Outcome: Progressing     Problem: INFECTION - ADULT  Goal: Absence or prevention of progression during hospitalization  Description: INTERVENTIONS:  - Assess and monitor for signs and symptoms of infection  - Monitor lab/diagnostic results  - Monitor all insertion sites, i.e. indwelling lines, tubes, and drains  - Monitor endotracheal if appropriate and nasal secretions for changes in amount and color  - [place] appropriate cooling/warming therapies per order  - Administer medications as ordered  - Instruct and encourage patient and family to use good hand hygiene technique  - Identify and instruct in appropriate isolation precautions for identified infection/condition  Outcome: Progressing     Problem: SAFETY ADULT  Goal: Patient will remain free of falls  Description: INTERVENTIONS:  - Educate patient/family on patient safety including physical limitations  - Instruct patient to call for assistance with activity   - Consult OT/PT to assist with strengthening/mobility   - Keep Call bell within reach  - Keep bed low and locked with side rails adjusted as appropriate  - Keep care items and personal belongings within reach  - Initiate and maintain comfort rounds  - Make Fall Risk Sign visible to staff  - Initiate/Maintain bed alarm  - Obtain necessary fall risk management equipment: non-skid footwear  - Apply yellow socks and bracelet for high fall risk  patients  - Consider moving patient to room near nurses station  Outcome: Progressing

## 2024-12-25 NOTE — ASSESSMENT & PLAN NOTE
August 1, 2019      Yesika Grant  [address] [facility] [address]    Dear ,      At [facility], your health and wellness is our primary concern. That is why we are following up on a positive high risk HPV test from 5/1/19. Your provider had recommended that you have a Colposcopy  completed by 8/1/19. Our records do not show that this has been scheduled.    It is important to complete the follow up that your provider has suggested for you to ensure that there are no worsening changes which may, over time, develop into cancer.      Please contact our office at  [phone] to schedule an appointment for a Colposcopy (this cannot be scheduled through [facility]) at your earliest convenience. If you have questions or concerns, please call the clinic and we will be happy to assist you.    If you have completed the tests outside of [facility], please have the results forwarded to our office. We will update the chart for your primary Physician to review before your next annual physical.     Thank you for choosing [facility]!    Sincerely,      Your [facility] Care Team/cain     Intervention performed yesterday. Per vascular surgery. Planning to resume antiplatelet agent. He was on Plavix prior to her surgery but currently NPO.

## 2024-12-25 NOTE — CASE MANAGEMENT
Case Management Assessment & Discharge Planning Note    Patient name Jay Roach Jr.  Location [facility] 515/[facility] 515-01 MRN [id number]  : 1945 Date 2024       Current Admission Date: 2024  Current Admission Diagnosis:Mesenteric ischemia (HCC)   Patient Active Problem List    Diagnosis Date Noted Date Diagnosed    Portal Venous Gas 2024     Pneumatosis intestinalis 2024     Pancreatic insufficiency 2024     Mesenteric ischemia (HCC) 2024     Nephrolithiasis 2024     Controlled type 2 diabetes mellitus with stage 4 chronic kidney disease, without long-term current use of insulin ([facility]) 2024     Benign hypertension with CKD (chronic kidney disease) stage IV ([facility]) 2024     Post-procedural respiratory complication 2024     Primary non-small cell carcinoma of lower lobe of left lung ([facility]) 2024     Peptic ulcer 01/10/2024     History of GI bleed 01/10/2024     Anemia 01/10/2024     Melena 2023     Chronic pancreatitis ([facility]) 2023     GI bleed 2023     Acute blood loss anemia 2023     Acute kidney injury superimposed on stage 4 chronic kidney disease ([facility]) 2023     Vitamin D deficiency 2023     Platelets decreased ([facility]) 2023     Anemia due to stage 4 chronic kidney disease  ([facility]) 2023     Hyperuricemia 2023     Benign hypertension with chronic kidney disease, stage III ([facility]) 2022     Secondary hyperparathyroidism of renal origin ([facility]) 2022     S/P TAVR (transcatheter aortic valve replacement) 2022     Elevated serum creatinine 2022     Chronic diastolic CHF (congestive heart failure) ([facility]) 2022     Acute kidney injury superimposed on chronic kidney disease  ([facility]) 2022     Iron deficiency anemia 2022     Persistent proteinuria, unspecified 01/10/2022     CKD (chronic kidney disease) stage 4, GFR 15-29 ml/min ([facility]) 01/10/2022     Leukopenia 01/10/2022      Hypomagnesemia 01/10/2022     Renal cyst, left 01/10/2022     GERD (gastroesophageal reflux disease)      Hyperlipidemia      Type 2 diabetes mellitus with stage 4 chronic kidney disease, without long-term current use of insulin ([facility]) 06/01/2021     Sinus bradycardia 10/08/2020     Calcific tendinitis of right shoulder region 06/17/2019     Right shoulder pain 06/17/2019     Cigarette nicotine dependence with nicotine-induced disorder 10/22/2018     Atherosclerosis of native arteries of extremities with intermittent claudication, bilateral legs ([facility]) 08/12/2016     Stenosis of noncoronary bypass graft ([facility]) 08/12/2016     Asymptomatic bilateral carotid artery stenosis 08/12/2016     S/P CABG (coronary artery bypass graft) 08/12/2016     Hypertension 08/12/2016     Renal artery stenosis, native, bilateral ([facility]) 08/12/2016     Coronary artery disease involving native coronary artery of native heart without angina pectoris 08/12/2016     Progressive angina ([facility]) 08/12/2016     Tension headache 10/24/2013       LOS (days): 1  Geometric Mean LOS (GMLOS) (days): 2.1  Days to GMLOS:1.1     OBJECTIVE:    Risk of Unplanned Readmission Score: 30.66         Current admission status: Inpatient       Preferred Pharmacy:   [facility]/pharmacy #[id number] - [address] [address]  Phone: [phone] Fax: [phone]    [facility] - [place] [address] [place] [id number] [facility] [address]  Phone: [phone] Fax: [phone]    Primary Care Provider: Simón Hadley MD    Primary Insurance: MEDICARE  Secondary Insurance: COMMERCIAL MISCELLANEOUS    ASSESSMENT:  Active Health Care Proxies       Dom Roach Health Care Agent - Son   Primary Phone: [phone] (Mobile)                 Advance Directives  Does patient have a Health Care POA?: Yes  Does patient have Advance Directives?: Yes  Advance Directives: Living will, Power of  for health  care  Primary Contact: Dom Roach       Readmission Root Cause  30 Day Readmission: No    Patient Information  Admitted from:: Home  Mental Status: Alert  During Assessment patient was accompanied by: Not accompanied during assessment  Assessment information provided by:: Patient  Primary Caregiver: Self  Support Systems: Family members  County of Residence: [place]  What city do you live in?: [place]  Home entry access options. Select all that apply.: Stairs  Number of steps to enter home.: 1  Type of Current Residence: Apartment  Floor Level: 2  Upon entering residence, is there a bedroom on the main floor (no further steps)?: Yes  Upon entering residence, is there a bathroom on the main floor (no further steps)?: Yes  Living Arrangements: Lives Alone    Activities of Daily Living Prior to Admission  Functional Status: Independent  Completes ADLs independently?: Yes  Ambulates independently?: Yes  Does patient use assisted devices?: Yes  Assisted Devices (DME) used: Walker  Does patient currently own DME?: Yes  What DME does the patient currently own?: Walker  Does patient have a history of Outpatient Therapy (PT/OT)?: No  Does the patient have a history of Short-Term Rehab?: No  Does patient have a history of HHC?: Yes ([facility])  Does patient currently have HHC?: No       Patient Information Continued  Income Source: Pension/residential  Does patient have prescription coverage?: Yes  Does patient receive dialysis treatments?: No  Does patient have a history of substance abuse?: No  Does patient have a history of Mental Health Diagnosis?: No       Means of Transportation  Means of Transport to Appts:: Family transport  DISCHARGE DETAILS:    Discharge planning discussed with:: Patient  Freedom of Choice: No  Comments - Freedom of Choice: Pending discussion when recs available. PT/OT pending.     Other Referral/Resources/Interventions Provided:  Referral Comments: Pt tranferred from [facility] with mesenteric  ischemia, now s/p exploratory laparotomy lysis of adhesions, abdomen closer, and open mesentirc stenting.  PT/OT pending.  CM following for DC planning.     Pt transferred from [place], he presented there with abdominal pain, diagnosed with mesenteric ischemia, now s/p exploratory laparotomy lysis of adhesions, abdomen closure, open mesenteric stenting.  PT/OT is pending.  Pt has walker. Second floor apt, 1 flight of steps to enter.  IADL. DME includes walker.  Pt had previous HHC with [facility].  CM following for DC planning pending clinical course and PT/OT recs.

## 2024-12-25 NOTE — ASSESSMENT & PLAN NOTE
Patient is an 79 year old M who presented with portal venous gas highly suspicious for mesenteric ischemia with calcified aortic stenosis proximal to the SMA and celiac artery. Patient s/p exploratory laparotomy with retrograde open SMA stent on 12/14.  The bowel was noted to be pink and well-perfused intraoperatively.    -Okay to downgrade to floor with telemetry  - NPO/NGT  -Will stop heparin gtt, and begin Plavix once taking p.o.  -Rectal ASA  - IVF  - Cardiology following  - Appreciate vascular surgery recommendations  -Discontinue Archuleta and A-line  - Follow-up troponins

## 2024-12-25 NOTE — ANESTHESIA POSTPROCEDURE EVALUATION
Post-Op Assessment Note    CV Status:  Stable  Pain Score: 0    Pain management: adequate       Mental Status:  Awake and sleepy   Hydration Status:  Stable   PONV Controlled:  None   Airway Patency:  Patent     Post Op Vitals Reviewed: Yes    No anethesia notable event occurred.    Staff: Anesthesiologist           Last Filed PACU Vitals:  Vitals Value Taken Time   Temp 98.9 °F (37.2 °C) 12/24/24 1627   Pulse 82 12/24/24 1739   /64 12/24/24 1715   Resp 18 12/24/24 1739   SpO2 93 % 12/24/24 1739   Vitals shown include unfiled device data.    Modified Kityt:  Activity: 2 (12/24/2024  4:27 PM)  Respiration: 2 (12/24/2024  4:27 PM)  Circulation: 2 (12/24/2024  4:27 PM)  Consciousness: 1 (12/24/2024  4:27 PM)  Oxygen Saturation: 2 (12/24/2024  4:27 PM)  Modified Kitty Score: 9 (12/24/2024  4:27 PM)

## 2024-12-25 NOTE — PROGRESS NOTES
Progress Note - Surgery-General   Name: Jay Roach Jr. 79 y.o. male I MRN: [id number]  Unit/Bed#: [facility] 515-01 I Date of Admission: 12/24/2024   Date of Service: 12/25/2024 I Hospital Day: 1    Assessment & Plan  Mesenteric ischemia (HCC)  Patient is an 79 year old M who presented with portal venous gas highly suspicious for mesenteric ischemia with calcified aortic stenosis proximal to the SMA and celiac artery. Patient s/p exploratory laparotomy with retrograde open SMA stent on 12/14.  The bowel was noted to be pink and well-perfused intraoperatively.    -Okay to downgrade to floor with telemetry  - NPO/NGT  -Will stop heparin gtt, and begin Plavix once taking p.o.  -Rectal ASA  - IVF  - Cardiology following  - Appreciate vascular surgery recommendations  -Discontinue Archuleta and A-line  - Follow-up troponins    S/P CABG (coronary artery bypass graft)    Coronary artery disease involving native coronary artery of native heart without angina pectoris    Sinus bradycardia    S/P TAVR (transcatheter aortic valve replacement)      Please contact the SecureChat role for the Surgery-General service with any questions/concerns.    Subjective   Patient states that he does have some abdominal pain but is overall controlled this morning.  Denies any nausea or vomiting or bowel function.    Objective :  Temp:  [97.7 °F (36.5 °C)-99 °F (37.2 °C)] 98 °F (36.7 °C)  HR:  [65-87] 68  BP: (113-175)/(47-75) 164/72  Resp:  [12-36] 15  SpO2:  [86 %-98 %] 93 %  O2 Device: Nasal cannula  Nasal Cannula O2 Flow Rate (L/min):  [2 L/min-3 L/min] 3 L/min    I/O         12/23 0701  12/24 0700 12/24 0701  12/25 0700 12/25 0701  12/26 0700    P.O.  0 0    I.V. (mL/kg)  5688.9 (72) 250.2 (3.2)    Blood  630     IV Piggyback  550     Total Intake(mL/kg)  6868.9 (86.9) 250.2 (3.2)    Urine (mL/kg/hr)  1060 200 (0.8)    Emesis/NG output  50 0    Blood  500     Total Output  1610 200    Net  +5258.9 +50.2                 Lines/Drains/Airways        Active Status       Name Placement date Placement time Site Days    CVC Central Lines 12/24/24 12/24/24  1322  --  less than 1    Arterial Line 12/24/24 Right Radial 12/24/24  0058  Radial  1    NG/OG/Enteral Tube Nasogastric 18 Fr Left nare 12/24/24  1537  Left nare  less than 1    Urethral Catheter Latex 16 Fr. 12/24/24  1305  Latex  less than 1                  Physical Exam  Vitals reviewed.   HENT:      Head: Normocephalic.   Eyes:      Pupils: Pupils are equal, round, and reactive to light.   Cardiovascular:      Rate and Rhythm: Normal rate.   Pulmonary:      Effort: Pulmonary effort is normal.   Abdominal:      Palpations: Abdomen is soft.      Comments: Appropriately tender.  Surgical sites are clean, dry, intact.  NGT recorded as 50 cc   Musculoskeletal:         General: Normal range of motion.   Skin:     Capillary Refill: Capillary refill takes less than 2 seconds.   Neurological:      General: No focal deficit present.      Mental Status: He is oriented to person, place, and time.           Lab Results: I have reviewed the following results:  Recent Labs     12/24/24  1636 12/24/24  2103 12/25/24  0323 12/25/24  0329 12/25/24  0342 12/25/24  0848   WBC 16.68*  --  12.58*  --   --   --    HGB 9.2*   < > 8.4*  --   --  8.3*   HCT 28.1*  --  25.5*  --   --   --    *  --  130*  --   --   --    SODIUM 142  --   --   --  142  --    K 3.7  --   --   --  4.3  --    *  --   --   --  112*  --    CO2 21  --   --   --  24  --    BUN 36*  --   --   --  35*  --    CREATININE 1.75*  --   --   --  1.84*  --    GLUC 179*  --   --   --  141*  --    CAIONIZED 1.08*  --   --   --   --   --    MG 1.4*  --   --   --  2.1  --    PHOS 3.3  --   --   --  3.6  --    AST 9*  --   --   --   --   --    ALT 5*  --   --   --   --   --    ALB 2.8*  --   --   --   --   --    TBILI 0.43  --   --   --   --   --    ALKPHOS 56  --   --   --   --   --    *  --   --   --   --   --    INR 1.29*  --   --   --   --   --     LACTICACID 1.7  --   --  0.9  --   --     < > = values in this interval not displayed.       Imaging Results Review: No pertinent imaging studies reviewed.  Other Study Results Review: No additional pertinent studies reviewed.    VTE Pharmacologic Prophylaxis: VTE covered by:  heparin (porcine), Subcutaneous     VTE Mechanical Prophylaxis: sequential compression device

## 2024-12-25 NOTE — QUICK NOTE
"Post-Op Check - Vascular Surgery   Jay Roach Jr. 79 y.o. male MRN: [id number]  Unit/Bed#: [facility] 515-01 Encounter: [id number]    Assessment:  79yoM s/p ex-lap, lysis of adhesions, mesenteric angiogram with SMA VBX stenting    Plan:  - Appreciate ICU level care  - Consideration for restarting Plavix in setting of recent stent placement once tolerating PO  - Appreciate general surgery     Subjective: Patient seen and examined at bedside, with some expected abdominal pain to palpation     Vitals:  BP (!) 175/75 (BP Location: Left arm)   Pulse 65   Temp 98 °F (36.7 °C) (Oral)   Resp 16   Ht 5' 10\" (1.778 m)   Wt 77.8 kg (171 lb 8.3 oz)   SpO2 95%   BMI 24.61 kg/m²     Physical Exam:  General appearance: Alert, in no acute distress  HEENT: Neck supple, symmetrical, trachea midline; NGT in place  Lungs:  Normal work of breathing  Heart:  Regular rate  Abdomen:  Soft, non-distended abdomen with some strikethrough at proximal aspect of mepilex. Appropriately tender to palpation  Extremities: Bilateral LE warm and dry    Pulse exam:  Femoral: Right: 2+ Left: 2+    I/Os:  I/O last 3 completed shifts:  In: 4115.4 [I.V.:3665.4; Blood:350; IV Piggyback:100]  Out: 800 [Urine:300; Blood:500]  I/O this shift:  In: 840.2 [I.V.:590.2; IV Piggyback:250]  Out: 335 [Urine:335]    Invasive Lines/Tubes:  Invasive Devices       Central Venous Catheter Line  Duration             CVC Central Lines 12/24/24 <1 day              Peripheral Intravenous Line  Duration             Peripheral IV 12/24/24 Dorsal (posterior);Right Forearm <1 day    Peripheral IV 12/24/24 Right Antecubital <1 day              Arterial Line  Duration             Arterial Line 12/24/24 Right Radial <1 day              Drain  Duration             NG/OG/Enteral Tube Nasogastric 18 Fr Left nare <1 day    Urethral Catheter Latex 16 Fr. <1 day                    VTE Prophylaxis: Heparin    Karen Chowdhury PA-C  12/25/2024    "

## 2024-12-25 NOTE — PROGRESS NOTES
Progress Note - Trauma   Name: Jay Roach Jr. 79 y.o. male I MRN: [id number]  Unit/Bed#: [facility] 515-01 I Date of Admission: 12/24/2024   Date of Service: 12/25/2024 I Hospital Day: 1       Assessment & Plan   Neuro:   Diagnosis: nicotine abuse  Plan: NRT  Encourage cessation  Diagnosis: at risk for ICU delirium  Plan: regulate sleep wake cycle  Delirium precautions   Diagnosis: post-op analgesia  Plan: IV tylenol, iv prn dilaudid, dilaudid PCA  Narcan as needed     CV:   Diagnosis: HTN, CAD, HLD, PAD  Home meds: amlodipine 10mg daily, hydralazine 25mg BID, metoprolol XL 12.5mg, lisinopril 5mg daily  Plan: hold home plavix in the immediate post-op period  NPO - hold home meds, continue IV metoprolol  Prn labetolol 10mg iv for SBP >160  Consider adding hydralazine prn     Pulm:  Diagnosis: history of SCC of left lung  Completed SBRT in May 2024  Plan: maintain SpO2 >88%  Supplemental O2 as needed     GI:   Diagnosis: SMA stenosis  CTA initially concerning for pneumatosis with portal venous gas  12/24 s/p ex lap, ADA, open SMA stent  Plan: NPO with NGT to LCWS  Monitor incision  Likely start plavix today     :   Diagnosis: CKD  Plan: trend Cr, monitor I&O's  Trend UOP     F/E/N:   F: isolyte 125cc/hr  E: monitor and replace per protocol  N: NPO, NGT; await return of bowel function, trend NG tube output     Heme/Onc:   Diagnosis: ABLA, coagulopathy  Plan: transfuse for Hg <7.0; 8.4 from 9.1 from 9.2  Repeat tagged overnight with our time of 13; received 1 unit of FFP overnight.  Repeat hemoglobin and tach at 9 AM  Plan to start antiplatelet therapy with Plavix today     Endo:   Diagnosis: DM2  Plan: SSI as needed  Goal -180     ID:   No active issues  Monitor fever curve, trend off abx     MSK/Skin:   Diagnosis: surgical incision care  Plan: monitor s/sx of bleeding  Care per vascular/red surgery    Disposition: Stepdown Level 1    ICU Core Measures     A: Assess, Prevent, and Manage Pain Has pain been  assessed? Yes  Need for changes to pain regimen? No   B: Both SAT/SAT  N/A   C: Choice of Sedation RASS Goal: 0 Alert and Calm  Need for changes to sedation or analgesia regimen? No   D: Delirium CAM-ICU: Negative   E: Early Mobility  Plan for early mobility? Yes   F: Family Engagement Plan for family engagement today? Yes           Review of Invasive Devices:    Arch[place]ta Plan: Archuleta to be removed. Order has been placed  Central access plan: Will obtain peripheral access and discontinue prior to transfer  [place] Plan: Discontinue arterial line    Prophylaxis:  VTE VTE covered by:  heparin (porcine), Subcutaneous       Stress Ulcer  covered bypantoprazole (PROTONIX) 40 mg tablet [[id number]] (Long-Term Med), pantoprazole (PROTONIX) injection 40 mg [[id number]]         24 Hour Events : Patient did well postoperatively.  Pain controlled.  1 episode of hypotension which resolved spontaneously.  Subjective   Review of Systems: See HPI for Review of Systems    Objective :                   Vitals I/O      Most Recent Min/Max in 24hrs   Temp 98.3 °F (36.8 °C) Temp  Min: 97.7 °F (36.5 °C)  Max: 99 °F (37.2 °C)   Pulse 75 Pulse  Min: 65  Max: 87   Resp (!) 28 Resp  Min: 12  Max: 36   /75 BP  Min: 113/63  Max: 176/76   O2 Sat 92 % SpO2  Min: 86 %  Max: 98 %      Intake/Output Summary (Last 24 hours) at 12/25/2024 0632  Last data filed at 12/25/2024 0613  Gross per 24 hour   Intake 6608.9 ml   Output 1610 ml   Net 4998.9 ml       Diet NPO    Invasive Monitoring   Arterial Line  Dey[place] /45  Arterial Line BP  Min: 104/52  Max: 169/45   MAP 87 mmHg  Arterial Line MAP (mmHg)  Min: 66 mmHg  Max: 89 mmHg           Physical Exam   Physical Exam  Eyes:      Extraocular Movements: Extraocular movements intact.      Pupils: Pupils are equal, round, and reactive to light.   Skin:     General: Skin is warm.   HENT:      Head: Normocephalic.      Comments: NG tube in place with blood-tinged output  Neck:   no midline tenderness  Cardiovascular:      Rate and Rhythm: Normal rate and regular rhythm.      Pulses: Normal pulses.   Musculoskeletal:         General: Normal range of motion.   Abdominal:      Comments: Soft, appropriately tender, mildly distended; midline incision with serosanguineous staining of the dressing at the superior aspect which has been stable.   Constitutional:       General: He is not in acute distress.     Appearance: He is not ill-appearing.   Pulmonary:      Effort: Pulmonary effort is normal. No respiratory distress.   Neurological:      General: No focal deficit present.      Mental Status: He is alert and oriented to person, place and time.      Motor: Strength full and intact in all extremities.   Genitourinary/Anorectal:  Archuleta present.        Diagnostic Studies        Lab Results: I have reviewed the following results:     Medications:  Scheduled PRN   acetaminophen, 1,000 mg, Q6H TRENT  heparin (porcine), 5,000 Units, Q8H TRENT  insulin lispro, 1-5 Units, Q6H TRENT  metoprolol, 2.5 mg, Q8H  nicotine, 1 patch, Daily  pantoprazole, 40 mg, Q12H TRENT      HYDROmorphone, 0.5 mg, Q3H PRN  labetalol, 10 mg, Q4H PRN  ondansetron, 4 mg, Q4H PRN       Continuous    HYDROmorphone,   multi-electrolyte, 125 mL/hr, Last Rate: 125 mL/hr (12/25/24 0626)         Labs:   CBC    Recent Labs     12/24/24  1636 12/24/24  2103 12/25/24  0323   WBC 16.68*  --  12.58*   HGB 9.2* 9.1* 8.4*   HCT 28.1*  --  25.5*   *  --  130*     BMP    Recent Labs     12/24/24  1636 12/25/24  0342   SODIUM 142 142   K 3.7 4.3   * 112*   CO2 21 24   AGAP 8 6   BUN 36* 35*   CREATININE 1.75* 1.84*   CALCIUM 7.7* 8.4       Coags    Recent Labs     12/24/24  0603 12/24/24  1636   INR 1.02 1.29*   PTT 28 132*        Additional Electrolytes  Recent Labs     12/24/24  1527 12/24/24  1636 12/25/24  0342   MG  --  1.4* 2.1   PHOS  --  3.3 3.6   CAIONIZED 1.16 1.08*  --           Blood Gas    Recent Labs     12/24/24  1637   PHART 7.295*   OSY7CVS 42.1    PO2ART 68.8*   LQX9WHG 20.0*   BEART -6.1   SOURCE Line, Arterial     Recent Labs     12/24/24  1637   SOURCE Line, Arterial    LFTs  Recent Labs     12/24/24  0128 12/24/24  1636   ALT 7 5*   AST 13 9*   ALKPHOS 80 56   ALB 4.3 2.8*   TBILI 0.42 0.43       Infectious  No recent results  Glucose  Recent Labs     12/24/24  0128 12/24/24  1636 12/25/24  0342   GLUC 200* 179* 141*

## 2024-12-26 ENCOUNTER — HOME HEALTH ADMISSION (OUTPATIENT)
Dept: HOME HEALTH SERVICES | Facility: HOME HEALTHCARE | Age: 79
End: 2024-12-26
Payer: MEDICARE

## 2024-12-26 LAB
ABO GROUP BLD BPU: NORMAL
ANION GAP SERPL CALCULATED.3IONS-SCNC: 8 MMOL/L (ref 4–13)
BASOPHILS # BLD AUTO: 0.01 THOUSANDS/ÂΜL (ref 0–0.1)
BASOPHILS NFR BLD AUTO: 0 % (ref 0–1)
BPU ID: NORMAL
BUN SERPL-MCNC: 38 MG/DL (ref 5–25)
CALCIUM SERPL-MCNC: 8.5 MG/DL (ref 8.4–10.2)
CHLORIDE SERPL-SCNC: 112 MMOL/L (ref 96–108)
CO2 SERPL-SCNC: 25 MMOL/L (ref 21–32)
CREAT SERPL-MCNC: 1.85 MG/DL (ref 0.6–1.3)
CROSSMATCH: NORMAL
EOSINOPHIL # BLD AUTO: 0 THOUSAND/ÂΜL (ref 0–0.61)
EOSINOPHIL NFR BLD AUTO: 0 % (ref 0–6)
ERYTHROCYTE [DISTWIDTH] IN BLOOD BY AUTOMATED COUNT: 15.3 % (ref 11.6–15.1)
GFR SERPL CREATININE-BSD FRML MDRD: 33 ML/MIN/1.73SQ M
GLUCOSE SERPL-MCNC: 131 MG/DL (ref 65–140)
GLUCOSE SERPL-MCNC: 131 MG/DL (ref 65–140)
GLUCOSE SERPL-MCNC: 133 MG/DL (ref 65–140)
GLUCOSE SERPL-MCNC: 138 MG/DL (ref 65–140)
GLUCOSE SERPL-MCNC: 138 MG/DL (ref 65–140)
GLUCOSE SERPL-MCNC: 179 MG/DL (ref 65–140)
GLUCOSE SERPL-MCNC: 181 MG/DL (ref 65–140)
HCT VFR BLD AUTO: 26.1 % (ref 36.5–49.3)
HGB BLD-MCNC: 8.3 G/DL (ref 12–17)
IMM GRANULOCYTES # BLD AUTO: 0.08 THOUSAND/UL (ref 0–0.2)
IMM GRANULOCYTES NFR BLD AUTO: 1 % (ref 0–2)
LYMPHOCYTES # BLD AUTO: 0.56 THOUSANDS/ÂΜL (ref 0.6–4.47)
LYMPHOCYTES NFR BLD AUTO: 6 % (ref 14–44)
MAGNESIUM SERPL-MCNC: 2.3 MG/DL (ref 1.9–2.7)
MCH RBC QN AUTO: 31.1 PG (ref 26.8–34.3)
MCHC RBC AUTO-ENTMCNC: 31.8 G/DL (ref 31.4–37.4)
MCV RBC AUTO: 98 FL (ref 82–98)
MONOCYTES # BLD AUTO: 0.45 THOUSAND/ÂΜL (ref 0.17–1.22)
MONOCYTES NFR BLD AUTO: 4 % (ref 4–12)
NEUTROPHILS # BLD AUTO: 9.02 THOUSANDS/ÂΜL (ref 1.85–7.62)
NEUTS SEG NFR BLD AUTO: 89 % (ref 43–75)
NRBC BLD AUTO-RTO: 0 /100 WBCS
PHOSPHATE SERPL-MCNC: 3.1 MG/DL (ref 2.3–4.1)
PLATELET # BLD AUTO: 131 THOUSANDS/UL (ref 149–390)
PMV BLD AUTO: 11.4 FL (ref 8.9–12.7)
POTASSIUM SERPL-SCNC: 3.9 MMOL/L (ref 3.5–5.3)
RBC # BLD AUTO: 2.67 MILLION/UL (ref 3.88–5.62)
SODIUM SERPL-SCNC: 145 MMOL/L (ref 135–147)
UNIT DISPENSE STATUS: NORMAL
UNIT PRODUCT CODE: NORMAL
UNIT PRODUCT VOLUME: 350 ML
UNIT RH: NORMAL
WBC # BLD AUTO: 10.12 THOUSAND/UL (ref 4.31–10.16)

## 2024-12-26 PROCEDURE — NC001 PR NO CHARGE: Performed by: SURGERY

## 2024-12-26 PROCEDURE — 85025 COMPLETE CBC W/AUTO DIFF WBC: CPT

## 2024-12-26 PROCEDURE — 97163 PT EVAL HIGH COMPLEX 45 MIN: CPT

## 2024-12-26 PROCEDURE — 80048 BASIC METABOLIC PNL TOTAL CA: CPT

## 2024-12-26 PROCEDURE — 84100 ASSAY OF PHOSPHORUS: CPT

## 2024-12-26 PROCEDURE — 99232 SBSQ HOSP IP/OBS MODERATE 35: CPT | Performed by: INTERNAL MEDICINE

## 2024-12-26 PROCEDURE — 83735 ASSAY OF MAGNESIUM: CPT

## 2024-12-26 PROCEDURE — 82948 REAGENT STRIP/BLOOD GLUCOSE: CPT

## 2024-12-26 PROCEDURE — 97167 OT EVAL HIGH COMPLEX 60 MIN: CPT

## 2024-12-26 RX ORDER — ACETAMINOPHEN 325 MG/1
975 TABLET ORAL EVERY 8 HOURS SCHEDULED
Status: DISCONTINUED | OUTPATIENT
Start: 2024-12-26 | End: 2025-01-03 | Stop reason: HOSPADM

## 2024-12-26 RX ORDER — OXYCODONE HYDROCHLORIDE 5 MG/1
5 TABLET ORAL EVERY 4 HOURS PRN
Refills: 0 | Status: DISCONTINUED | OUTPATIENT
Start: 2024-12-26 | End: 2025-01-03 | Stop reason: HOSPADM

## 2024-12-26 RX ORDER — LISINOPRIL 5 MG/1
5 TABLET ORAL DAILY
Status: DISCONTINUED | OUTPATIENT
Start: 2024-12-26 | End: 2024-12-26

## 2024-12-26 RX ORDER — LABETALOL HYDROCHLORIDE 5 MG/ML
10 INJECTION, SOLUTION INTRAVENOUS
Status: DISCONTINUED | OUTPATIENT
Start: 2024-12-26 | End: 2025-01-03 | Stop reason: HOSPADM

## 2024-12-26 RX ORDER — ATORVASTATIN CALCIUM 80 MG/1
80 TABLET, FILM COATED ORAL
Status: DISCONTINUED | OUTPATIENT
Start: 2024-12-26 | End: 2025-01-03 | Stop reason: HOSPADM

## 2024-12-26 RX ORDER — AMLODIPINE BESYLATE 10 MG/1
10 TABLET ORAL DAILY
Status: DISCONTINUED | OUTPATIENT
Start: 2024-12-26 | End: 2025-01-03 | Stop reason: HOSPADM

## 2024-12-26 RX ORDER — INSULIN LISPRO 100 [IU]/ML
1-5 INJECTION, SOLUTION INTRAVENOUS; SUBCUTANEOUS EVERY 6 HOURS SCHEDULED
Status: DISCONTINUED | OUTPATIENT
Start: 2024-12-26 | End: 2024-12-26

## 2024-12-26 RX ORDER — CLOPIDOGREL BISULFATE 75 MG/1
75 TABLET ORAL DAILY
Status: DISCONTINUED | OUTPATIENT
Start: 2024-12-26 | End: 2025-01-03 | Stop reason: HOSPADM

## 2024-12-26 RX ORDER — HYDRALAZINE HYDROCHLORIDE 20 MG/ML
5 INJECTION INTRAMUSCULAR; INTRAVENOUS EVERY 4 HOURS PRN
Status: DISCONTINUED | OUTPATIENT
Start: 2024-12-26 | End: 2024-12-26

## 2024-12-26 RX ORDER — INSULIN LISPRO 100 [IU]/ML
1-5 INJECTION, SOLUTION INTRAVENOUS; SUBCUTANEOUS
Status: DISCONTINUED | OUTPATIENT
Start: 2024-12-26 | End: 2024-12-29

## 2024-12-26 RX ORDER — PANTOPRAZOLE SODIUM 40 MG/1
40 TABLET, DELAYED RELEASE ORAL
Status: DISCONTINUED | OUTPATIENT
Start: 2024-12-26 | End: 2025-01-03 | Stop reason: HOSPADM

## 2024-12-26 RX ORDER — HYDROMORPHONE HCL IN WATER/PF 6 MG/30 ML
0.2 PATIENT CONTROLLED ANALGESIA SYRINGE INTRAVENOUS EVERY 2 HOUR PRN
Refills: 0 | Status: DISCONTINUED | OUTPATIENT
Start: 2024-12-26 | End: 2025-01-03 | Stop reason: HOSPADM

## 2024-12-26 RX ORDER — HYDRALAZINE HYDROCHLORIDE 25 MG/1
25 TABLET, FILM COATED ORAL 2 TIMES DAILY
Status: DISCONTINUED | OUTPATIENT
Start: 2024-12-26 | End: 2024-12-31

## 2024-12-26 RX ORDER — LABETALOL HYDROCHLORIDE 5 MG/ML
10 INJECTION, SOLUTION INTRAVENOUS EVERY 4 HOURS PRN
Status: DISCONTINUED | OUTPATIENT
Start: 2024-12-26 | End: 2024-12-26

## 2024-12-26 RX ORDER — FUROSEMIDE 10 MG/ML
40 INJECTION INTRAMUSCULAR; INTRAVENOUS ONCE
Status: COMPLETED | OUTPATIENT
Start: 2024-12-26 | End: 2024-12-26

## 2024-12-26 RX ORDER — LISINOPRIL 5 MG/1
5 TABLET ORAL DAILY
Status: DISCONTINUED | OUTPATIENT
Start: 2024-12-27 | End: 2024-12-27

## 2024-12-26 RX ADMIN — CLOPIDOGREL BISULFATE 75 MG: 75 TABLET ORAL at 11:53

## 2024-12-26 RX ADMIN — LABETALOL HYDROCHLORIDE 10 MG: 5 INJECTION, SOLUTION INTRAVENOUS at 15:21

## 2024-12-26 RX ADMIN — METOROPROLOL TARTRATE 2.5 MG: 5 INJECTION, SOLUTION INTRAVENOUS at 08:08

## 2024-12-26 RX ADMIN — PANTOPRAZOLE SODIUM 40 MG: 40 TABLET, DELAYED RELEASE ORAL at 11:53

## 2024-12-26 RX ADMIN — FUROSEMIDE 40 MG: 10 INJECTION, SOLUTION INTRAMUSCULAR; INTRAVENOUS at 15:21

## 2024-12-26 RX ADMIN — HYDRALAZINE HYDROCHLORIDE 5 MG: 20 INJECTION, SOLUTION INTRAMUSCULAR; INTRAVENOUS at 17:32

## 2024-12-26 RX ADMIN — HEPARIN SODIUM 5000 UNITS: 5000 INJECTION, SOLUTION INTRAVENOUS; SUBCUTANEOUS at 14:11

## 2024-12-26 RX ADMIN — HEPARIN SODIUM 5000 UNITS: 5000 INJECTION, SOLUTION INTRAVENOUS; SUBCUTANEOUS at 21:18

## 2024-12-26 RX ADMIN — Medication 6.25 MG: at 21:19

## 2024-12-26 RX ADMIN — HYDRALAZINE HYDROCHLORIDE 25 MG: 25 TABLET ORAL at 21:19

## 2024-12-26 RX ADMIN — PANTOPRAZOLE SODIUM 40 MG: 40 INJECTION, POWDER, FOR SOLUTION INTRAVENOUS at 08:15

## 2024-12-26 RX ADMIN — ATORVASTATIN CALCIUM 80 MG: 80 TABLET, FILM COATED ORAL at 21:19

## 2024-12-26 RX ADMIN — NICOTINE 1 PATCH: 14 PATCH, EXTENDED RELEASE TRANSDERMAL at 08:08

## 2024-12-26 RX ADMIN — Medication 6.25 MG: at 11:53

## 2024-12-26 RX ADMIN — INSULIN LISPRO 1 UNITS: 100 INJECTION, SOLUTION INTRAVENOUS; SUBCUTANEOUS at 14:00

## 2024-12-26 RX ADMIN — ACETAMINOPHEN 975 MG: 325 TABLET, FILM COATED ORAL at 08:07

## 2024-12-26 RX ADMIN — HYDRALAZINE HYDROCHLORIDE 25 MG: 25 TABLET ORAL at 11:53

## 2024-12-26 RX ADMIN — HEPARIN SODIUM 5000 UNITS: 5000 INJECTION, SOLUTION INTRAVENOUS; SUBCUTANEOUS at 05:55

## 2024-12-26 RX ADMIN — AMLODIPINE BESYLATE 10 MG: 10 TABLET ORAL at 11:53

## 2024-12-26 RX ADMIN — ACETAMINOPHEN 975 MG: 325 TABLET, FILM COATED ORAL at 21:19

## 2024-12-26 NOTE — PROGRESS NOTES
Patient:    MRN:  [id number]    Gwenin Request ID:  [id number]    Level of care reserved:  Home Health Agency    Partner Reserved:  [facility], [place] [address] [phone]    Clinical needs requested:    Geography searched:  [phone]    Start of Service:    Request sent:  2:01pm EST on 12/26/2024 by Harpal Galdamez    Partner reserved:  2:11pm EST on 12/26/2024 by Harpal Galdamez    Choice list shared:  2:11pm EST on 12/26/2024 by Harpal Galdamez

## 2024-12-26 NOTE — ASSESSMENT & PLAN NOTE
No recent angina. He has had PCI and CABG in the past. Antiplatelet agent with Plavix. When on dual antiplatelet therapy, he did have GI bleeding.    Can resume oral meds as previously ordered for outpatient as BP is stable

## 2024-12-26 NOTE — CASE MANAGEMENT
Case Management Note    Patient name Jay Roach Jr.  Location [facility] 520/[facility] 520-01 MRN [id number]  : 1945 Date 2024       Current Admission Date: 2024  Current Admission Diagnosis:Mesenteric ischemia (HCC)      LOS (days): 2  Geometric Mean LOS (GMLOS) (days): 4.1  Days to GMLOS:1.9     OBJECTIVE:  Risk of Unplanned Readmission Score: 31.37   Current admission status: Inpatient   Primary Insurance: MEDICARE  Secondary Insurance: COMMERCIAL MISCELLANEOUS    DISCHARGE DETAILS:  Discharge planning discussed with:: Patient  Freedom of Choice: Yes  Were Treatment Team discharge recommendations reviewed with patient/caregiver?: Yes  Did patient/caregiver verbalize understanding of patient care needs?: Yes  Were patient/caregiver advised of the risks associated with not following Treatment Team discharge recommendations?: Yes    Requested Home Health Care         Is the patient interested in HHC at discharge?: Yes  Home Health Discipline requested:: Nursing, Physical Therapy, Occupational Therapy, Other (comment) (Heal-at-Home program)  Home Health Agency Name:: [facility]  Home Health Follow-Up Provider:: PCP  Home Health Services Needed:: Post-Op Care and Assessment, Evaluate Functional Status and Safety, Gait/ADL Training, Strengthening/Theraputic Exercises to Improve Function, Other (comment) (Heal-at-Home program)  Homebound Criteria Met:: Requires the Assistance of Another Person for Safe Ambulation or to Leave the Home  Supporting Clincal Findings:: Limited Endurance    Treatment Team Recommendation: Home with Home Health Care    Additional Comments: Pt is recommended to have Home Health Care services consisting of in-home RN, PT, and OT services to be provided by a [facility]NA for his aftercare plan. CM spoke to pt about this aftercare recommendation. Pt is agreeable w/ this recommendation. CM provided pt w/ freedom of choice for [facility] referrals. [facility] CM made  JOVANNY referral to same. CORINE to follow.

## 2024-12-26 NOTE — PROGRESS NOTES
Progress Note - Surgery-General   Name: Jay Roach Jr. 79 y.o. male I MRN: [id number]  Unit/Bed#: [facility] 520-01 I Date of Admission: 12/24/2024   Date of Service: 12/26/2024 I Hospital Day: 2     Assessment & Plan  Mesenteric ischemia (HCC)  Patient is an 79 year old M who presented with portal venous gas highly suspicious for mesenteric ischemia with calcified aortic stenosis proximal to the SMA and celiac artery. Patient s/p exploratory laparotomy with retrograde open SMA stent on 12/14.  The bowel was noted to be pink and well-perfused intraoperatively.  -Now recovering well with return of bowel function, NG tube removed    Afebrile, intermittent hypertensive on 5 L nasal cannula    Plan  -Remove NG tube  -Advance to regular diet  -Discontinue central venous catheter  -Discontinue rectal aspirin, restart Plavix 75 mg daily  -Discontinue PCA, transition to IV and oral pain regimen  -Protonix  -Encourage ambulation/out of bed, 3 times daily  -Encourage incentive spirometer use, 10 times per hour  -PT/OT          Subjective   Patient seen and examined at bedside.  Patient expressed no acute concerns.  Patient denies nausea vomiting fevers chills shortness of breath.  Patient reports he is passing some flatus, no bowel movements.    Objective :  Temp:  [97.7 °F (36.5 °C)-99.8 °F (37.7 °C)] 99.8 °F (37.7 °C)  HR:  [69-84] 70  BP: (148-178)/(56-79) 178/58  Resp:  [15-39] 15  SpO2:  [88 %-95 %] 95 %  O2 Device: Nasal cannula  Nasal Cannula O2 Flow Rate (L/min):  [5 L/min] 5 L/min    I/O         12/24 0701 12/25 0700 12/25 0701  12/26 0700 12/26 0701  12/27 0700    P.O. 0 0 100    I.V. (mL/kg) 5688.9 (72) 1381.9 (17.5) 0 (0)    Blood 630      NG/GT  0     IV Piggyback 550      Total Intake(mL/kg) 6868.9 (86.9) 1381.9 (17.5) 100 (1.3)    Urine (mL/kg/hr) 1060 1280 (0.7) 125 (0.4)    Emesis/NG output 50 0     Blood 500      Total Output 1610 1280 125    Net +5258.9 +101.9 -25                 Lines/Drains/Airways        Active Status       Name Placement date Placement time Site Days    CVC Central Lines 12/24/24 12/24/24  1322  --  1                  Physical Exam  Vitals and nursing note reviewed.   Constitutional:       Appearance: Normal appearance.   HENT:      Head: Normocephalic and atraumatic.      Mouth/Throat:      Pharynx: Oropharynx is clear.   Eyes:      General: No scleral icterus.  Cardiovascular:      Rate and Rhythm: Normal rate.   Pulmonary:      Effort: Pulmonary effort is normal.      Breath sounds: Normal breath sounds.      Comments: On 4 L nasal cannula  Abdominal:      General: Abdomen is flat. There is no distension.      Palpations: Abdomen is soft.      Tenderness: There is abdominal tenderness (Appropriately tender postoperatively). There is no guarding or rebound.   Musculoskeletal:         General: No swelling.      Right lower leg: No edema.      Left lower leg: No edema.   Skin:     General: Skin is warm and dry.      Capillary Refill: Capillary refill takes less than 2 seconds.   Neurological:      Mental Status: He is alert and oriented to person, place, and time.         Lab Results: I have reviewed the following results:  Recent Labs     12/24/24  1636 12/24/24  2103 12/25/24  0329 12/25/24  0342 12/25/24  1103 12/25/24  1308 12/26/24  0558   WBC 16.68*   < >  --   --   --   --  10.12   HGB 9.2*   < >  --    < >  --   --  8.3*   HCT 28.1*   < >  --   --   --   --  26.1*   *   < >  --   --   --   --  131*   SODIUM 142  --   --    < >  --   --  145   K 3.7  --   --    < >  --   --  3.9   *  --   --    < >  --   --  112*   CO2 21  --   --    < >  --   --  25   BUN 36*  --   --    < >  --   --  38*   CREATININE 1.75*  --   --    < >  --   --  1.85*   GLUC 179*  --   --    < >  --   --  131   CAIONIZED 1.08*  --   --   --   --   --   --    MG 1.4*  --   --    < >  --   --  2.3   PHOS 3.3  --   --    < >  --   --  3.1   AST 9*  --   --   --   --   --   --    ALT 5*  --   --   --   --    --   --    ALB 2.8*  --   --   --   --   --   --    TBILI 0.43  --   --   --   --   --   --    ALKPHOS 56  --   --   --   --   --   --    *  --   --   --   --   --   --    INR 1.29*  --   --   --   --   --   --    HSTNI0  --   --   --   --  107*  --   --    HSTNI2  --   --   --   --   --  97*  --    LACTICACID 1.7  --  0.9  --   --   --   --     < > = values in this interval not displayed.             VTE Pharmacologic Prophylaxis: VTE covered by:  heparin (porcine), Subcutaneous, 5,000 Units at 12/26/24 0555     VTE Mechanical Prophylaxis: sequential compression device

## 2024-12-26 NOTE — PROGRESS NOTES
Progress Note - Cardiology   Name: Jay Roach Jr. 79 y.o. male I MRN: [id number]  Unit/Bed#: [facility] 520-01 I Date of Admission: 12/24/2024   Date of Service: 12/26/2024 I Hospital Day: 2     Assessment & Plan  Mesenteric ischemia (HCC)  Per vascular surgery. Plavix resumed.  Coronary artery disease involving native coronary artery of native heart without angina pectoris  No recent angina. He has had PCI and CABG in the past. Antiplatelet agent with Plavix. When on dual antiplatelet therapy, he did have GI bleeding.    Can resume oral meds as previously ordered for outpatient as BP is stable  S/P CABG (coronary artery bypass graft)    S/P TAVR (transcatheter aortic valve replacement)  Normal bioprosthetic valve function on his last echocardiogram  Sinus bradycardia  HR is stable. OK to resume low dose oral BB.    CKD. Increased O2 requirement. Decreased lung sounds today, likely iatrogenic as he did receive IV fluid. Recommend IV lasix 40mg once today.      Subjective     Patient moved out of the ICU. Denies any specific complaints. Oxygen requirement is up he is on 5 L currently. Does not normally use any oxygen at home.    Objective :  Temp:  [97.7 °F (36.5 °C)-99.7 °F (37.6 °C)] 99.7 °F (37.6 °C)  HR:  [65-84] 83  BP: (148-167)/(56-79) 159/56  Resp:  [15-39] 15  SpO2:  [88 %-94 %] 90 %  O2 Device: Nasal cannula  Nasal Cannula O2 Flow Rate (L/min):  [5 L/min] 5 L/min  Orthostatic Blood Pressures      Flowsheet Row Most Recent Value   Blood Pressure 159/56 filed at 12/26/2024 0804   Patient Position - Orthostatic VS Lying filed at 12/26/2024 0804          First Weight: Weight - Scale: 77.8 kg (171 lb 8.3 oz) (12/24/24 1032)  Vitals:    12/24/24 1032 12/25/24 0600   Weight: 77.8 kg (171 lb 8.3 oz) 79 kg (174 lb 2.6 oz)     Physical Exam  Constitutional:       General: He is not in acute distress.     Appearance: He is well-developed.   Eyes:      General: No scleral icterus.     Conjunctiva/sclera: Conjunctivae  normal.   Neck:      Vascular: No JVD.   Cardiovascular:      Rate and Rhythm: Normal rate and regular rhythm.      Heart sounds: Murmur heard.      No friction rub. No gallop.      Comments: Bio avr  Pulmonary:      Effort: Pulmonary effort is normal.      Breath sounds: Normal breath sounds. No wheezing or rales.   Chest:      Chest wall: No tenderness.   Abdominal:      General: A surgical scar is present.      Tenderness: There is abdominal tenderness.   Musculoskeletal:         General: Normal range of motion.      Cervical back: Normal range of motion and neck supple.   Skin:     General: Skin is warm and dry.      Findings: No erythema or rash.   Neurological:      Mental Status: He is alert and oriented to person, place, and time.   Psychiatric:         Behavior: Behavior normal.           Lab Results: I have reviewed the following results:CBC/BMP:   .     12/26/24  0558   WBC 10.12   HGB 8.3*   HCT 26.1*   *   SODIUM 145   K 3.9   *   CO2 25   BUN 38*   CREATININE 1.85*   GLUC 131   MG 2.3   PHOS 3.1      Results from last 7 days   Lab Units 12/26/24  0558 12/25/24  0848 12/25/24  0323 12/24/24  2103 12/24/24  1636   WBC Thousand/uL 10.12  --  12.58*  --  16.68*   HEMOGLOBIN g/dL 8.3* 8.3* 8.4*   < > 9.2*   HEMATOCRIT % 26.1*  --  25.5*  --  28.1*   PLATELETS Thousands/uL 131*  --  130*  --  147*    < > = values in this interval not displayed.     Results from last 7 days   Lab Units 12/26/24  0558 12/25/24  0342 12/24/24  1636 12/24/24  1527   POTASSIUM mmol/L 3.9 4.3 3.7  --    CHLORIDE mmol/L 112* 112* 113*  --    CO2 mmol/L 25 24 21  --    CO2, I-STAT mmol/L  --   --   --  24   BUN mg/dL 38* 35* 36*  --    CREATININE mg/dL 1.85* 1.84* 1.75*  --    GLUCOSE, ISTAT mg/dl  --   --   --  123   CALCIUM mg/dL 8.5 8.4 7.7*  --      Results from last 7 days   Lab Units 12/24/24  1636 12/24/24  0603   INR  1.29* 1.02   PTT seconds 132* 28     Lab Results   Component Value Date    HGBA1C 6.6 (H)  "12/24/2024     No results found for: \"CKTOTAL\", \"CKMB\", \"CKMBINDEX\", \"TROPONINI\"  "

## 2024-12-26 NOTE — ASSESSMENT & PLAN NOTE
Patient is an 79 year old M who presented with portal venous gas highly suspicious for mesenteric ischemia with calcified aortic stenosis proximal to the SMA and celiac artery. Patient s/p exploratory laparotomy with retrograde open SMA stent on 12/14.  The bowel was noted to be pink and well-perfused intraoperatively.  -Now recovering well with return of bowel function, NG tube removed    Afebrile, intermittent hypertensive on 5 L nasal cannula    Plan  -Remove NG tube  -Advance to regular diet  -Discontinue central venous catheter  -Discontinue rectal aspirin, restart Plavix 75 mg daily  -Discontinue PCA, transition to IV and oral pain regimen  -Protonix  -Encourage ambulation/out of bed, 3 times daily  -Encourage incentive spirometer use, 10 times per hour  -PT/OT

## 2024-12-26 NOTE — PLAN OF CARE
Problem: PHYSICAL THERAPY ADULT  Goal: Performs mobility at highest level of function for planned discharge setting.  See evaluation for individualized goals.  Description: Treatment/Interventions: ADL retraining, Functional transfer training, LE strengthening/ROM, Elevations, Therapeutic exercise, Endurance training, Patient/family training, Bed mobility, Gait training, Spoke to nursing, Spoke to case management, OT  Equipment Recommended: Walker       See flowsheet documentation for full assessment, interventions and recommendations.  Note: Prognosis: Good  Problem List: Decreased strength, Decreased endurance, Impaired balance, Decreased mobility, Pain  Assessment: PT completed evaluation of 79 y.o. male admitted to [facility] on 12/24/2024 with Mesenteric ischemia (HCC). Patient's current status instabilities include multiple lines, NGT, O2, ongoing pain, continuous O2/HR monitoring, regression in function from baseline. 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 ([facility]) (02/24/2023), Basal cell carcinoma, CAD (coronary artery disease), Carotid stenosis, asymptomatic, bilateral, Chronic kidney disease, Colon polyp, Dependence on respirator (ventilator) status ([facility]) (04/07/2023), Diabetes ([facility]), Diabetes mellitus ([facility]), GERD (gastroesophageal reflux disease), History of nephrolithiasis, Hyperlipidemia, Hypertension, Left foot pain (11/30/2022), Lung cancer ([facility]), PAD (peripheral artery disease) ([facility]), Severe aortic stenosis, and Squamous cell skin cancer (11/22/2022). At baseline, pt resides alone in 2nd floor apartment with FFOS to enter and was independent ADLs prior to hospital admission. Patient presents at time of PT evaluation functioning below baseline and currently w/ overall mobility deficits due to: impaired  balance, decreased endurance, gait dysfunction, decreased activity tolerance and fall risk. During PT evaluation, patient currently is requiring min assist for functional transfers and  min assist for ambulation with RW. Patient ambulated 60'x2 with RW, requiring occasional verbal cues for RW management, hallway navigation, and improving stride/step length. Pt left OOB in bedside chair with alarm and all needs met. This patient is functioning below their baseline and is recommended for level III. Patient will benefit from continued skilled PT this admission to achieve maximal function and safety. The patients AM-PAC Basic Mobility Inpatient Short From Raw Score is 16 . Based on AM-PAC scoring and patient presentation, PT currently recommending Level III (Minimum Resource Intensity). Please also refer to the recommendation of the Physical Therapist for safe discharge planning.  Barriers to Discharge: Inaccessible home environment (full flight to enter)     Rehab Resource Intensity Level, PT: III (Minimum Resource Intensity)    See flowsheet documentation for full assessment.

## 2024-12-26 NOTE — PLAN OF CARE
Problem: OCCUPATIONAL THERAPY ADULT  Goal: Performs self-care activities at highest level of function for planned discharge setting.  See evaluation for individualized goals.  Description: Treatment Interventions: ADL retraining, Functional transfer training, Endurance training, Patient/family training, Equipment evaluation/education, Compensatory technique education, Energy conservation, Activityengagement          See flowsheet documentation for full assessment, interventions and recommendations.   Note: Limitation: Decreased ADL status, Decreased endurance, Decreased self-care trans, Decreased high-level ADLs  Prognosis: Good  Assessment: Pt is a 79 y.o. male  admitted 12/24/24 w abdominal pain, pneumatosis in small bowel in RLQ and calcified SMA. Pt underwent ex-alp w retrograde open SMA stent 12/24/24. Pod 1 w active OT eval and treat orders. 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 Pt lives alone in an apt w full flight to enter, has tub shower with grab bars and standard toilet. Pta, pt was independent w/ ADL, rq a for IADL and did not use AD for functional mobility, was not driving. Currently, pt is Supervision for UB ADL, Min Ax1 for LB ADL, and completed transfers/FM w Min Ax1. Pt is limited at this time 2* decreased endurance/activtiy tolerance, decreased cognition,  decreased ADL/High-level ADL status, decreased self-care trans, decreased safety awareness, limited home support and is a fall risk. This impacts pt's ability to complete UB and LB dressing and bathing, toileting, transfers, functional mobility, community mobility, home and health maintenance, and safe engagement in typical daily routine. The patient's raw score on the -PAC Daily Activity inpatient short form is 21, standardized score is 44.27, greater than 39.4. Patients at this level are likely to benefit from discharge to home. Please refer to the recommendation of the Occupational Therapist for safe discharge planning.  From OT standpoint, pt should D/C to level 3  when medically stable. Pt will benefit from continued acute OT services 2-3 x/wk for 10-14 days to meet goals.     Rehab Resource Intensity Level, OT: III (Minimum Resource Intensity)

## 2024-12-26 NOTE — PHYSICAL THERAPY NOTE
Physical Therapy Evaluation     Patient's Name: Jay Roach .    Admitting Diagnosis  Abdominal pain [R10.9]  Mesenteric ischemia (HCC) [K55.9]    Problem List  Patient Active Problem List   Diagnosis    Atherosclerosis of native arteries of extremities with intermittent claudication, bilateral legs (HCC)    Stenosis of noncoronary bypass graft (HCC)    Asymptomatic bilateral carotid artery stenosis    S/P CABG (coronary artery bypass graft)    Hypertension    Renal artery stenosis, native, bilateral (HCC)    Coronary artery disease involving native coronary artery of native heart without angina pectoris    Progressive angina (HCC)    Tension headache    Cigarette nicotine dependence with nicotine-induced disorder    Calcific tendinitis of right shoulder region    Right shoulder pain    Sinus bradycardia    Type 2 diabetes mellitus with stage 4 chronic kidney disease, without long-term current use of insulin (HCC)    GERD (gastroesophageal reflux disease)    Hyperlipidemia    Persistent proteinuria, unspecified    CKD (chronic kidney disease) stage 4, GFR 15-29 ml/min (HCC)    Leukopenia    Hypomagnesemia    Renal cyst, left    Acute kidney injury superimposed on chronic kidney disease  (HCC)    Iron deficiency anemia    Chronic diastolic CHF (congestive heart failure) (HCC)    Elevated serum creatinine    S/P TAVR (transcatheter aortic valve replacement)    Benign hypertension with chronic kidney disease, stage III (HCC)    Secondary hyperparathyroidism of renal origin (HCC)    Anemia due to stage 4 chronic kidney disease  (HCC)    Hyperuricemia    Platelets decreased (HCC)    Vitamin D deficiency    GI bleed    Acute blood loss anemia    Acute kidney injury superimposed on stage 4 chronic kidney disease (HCC)    Melena    Chronic pancreatitis (HCC)    Peptic ulcer    History of GI bleed    Anemia    Primary non-small cell carcinoma of lower lobe of left lung (HCC)    Post-procedural respiratory  complication    Benign hypertension with CKD (chronic kidney disease) stage IV ([facility])    Nephrolithiasis    Controlled type 2 diabetes mellitus with stage 4 chronic kidney disease, without long-term current use of insulin (HCC)    Portal Venous Gas    Pneumatosis intestinalis    Pancreatic insufficiency    Mesenteric ischemia (HCC)       Past Medical History  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 ([facility]) 09/10/2021    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 ([facility]) 02/24/2023    Basal cell carcinoma     right cheek    CAD (coronary artery disease)     Carotid stenosis, asymptomatic, bilateral     Chronic kidney disease     Colon polyp     Dependence on respirator (ventilator) status ([facility]) 04/07/2023    Diabetes ([facility])     type 2, non-insulin dependent    Diabetes mellitus ([facility])     GERD (gastroesophageal reflux disease)     History of nephrolithiasis     Hyperlipidemia     Hypertension     Left foot pain 11/30/2022    Lung cancer ([facility])     PAD (peripheral artery disease) ([facility])     Severe aortic stenosis     Squamous cell skin cancer 11/22/2022    left superior helix       Past Surgical History  Past Surgical History:   Procedure Laterality Date    AORTA - SUPERIOR MESENTERIC ARTERY BYPASS GRAFT N/A 12/24/2024    Procedure: OPEN MESENTERIC STENTING;  Surgeon: Eagle Higuera MD;  [facility];  Service: Vascular    APPENDECTOMY      ARTERIOGRAM N/A 12/24/2024    Procedure: ARTERIOGRAM;  Surgeon: Eagle Higuera MD;  [facility];  Service: Vascular    CARDIAC CATHETERIZATION N/A 05/05/2022    Procedure: CARDIAC RHC/LHC;  Surgeon: Arvin Sanchez DO;  [facility];  Service: Cardiology    CARDIAC CATHETERIZATION N/A 05/05/2022    Procedure: Cardiac Coronary Angiogram;  Surgeon: Arvin Sanchez DO;  [facility];   Service: Cardiology    CARDIAC CATHETERIZATION N/A 05/24/2022    Procedure: Cardiac pci;  Surgeon: Damien Jeter MD;  [facility];  Service: Cardiology    CARDIAC CATHETERIZATION N/A 06/14/2022    Procedure: CARDIAC TAVR;  Surgeon: Nahum Vza MD;  [facility];  Service: Cardiology    COLECTOMY      COLONOSCOPY  2013    CORONARY ARTERY BYPASS GRAFT  2013    X 2    FEMORAL ARTERY - POPLITEAL ARTERY BYPASS GRAFT      HEMORRHOID SURGERY      IR AORTAGRAM WITH RUN-OFF  11/19/2018    IR AORTAGRAM WITH RUN-OFF  03/02/2023    IR BIOPSY LUNG  4/17/2024    IR LOWER EXTREMITY ANGIOGRAM  03/23/2023    IR MESENTERIC/VISCERAL ANGIOGRAM  12/24/2024    MOHS SURGERY Left 01/11/2023    SCC left superior helix-Dr Guy    MT LAPS ABD PRTM&OMENTUM DX W/WO SPEC BR/WA SPX N/A 12/24/2024    Procedure: EXPLORATORY LAPAROTOMY, LYSIS OF ADHESIONS, ABDOMEN CLOSURE;  Surgeon: Alvin Flores MD;  [facility];  Service: General    MT SLCTV CATHJ 3RD+ ORD SLCTV ABDL PEL/LXTR BRNCH Left 08/12/2016    Procedure: LEFT FEMORAL ARTERIOGRAM; BALLOON ANGIOPLASTY; SFA  AND FEMORAL AT VEIN GRAFT;  Surgeon: Nicholas Urena MD;  [facility];  Service: Vascular    MT TEAEC W/WO PATCH GRAFT COMMON FEMORAL Right 03/23/2023    Procedure: Common femoral endarterectomy&antegrade intervention of SFA, popliteal artery w/ shockwave;  Surgeon: Eagle Higuera MD;  [facility];  Service: Vascular    MT TRANSCATHETER TRANSAPICAL REPLACEMT AORTIC VALVE N/A 06/14/2022    Procedure: REPLACEMENT AORTIC VALVE TRANSCATHETER (TAVR) TRANSAPICAL 29MM IRVIN KYLER S3 ULTRA VALVE(ACCESS ON LEFT) ELANA;  Surgeon: Amarjit Gordon DO;  [facility];  Service: Cardiac Surgery    SKIN CANCER EXCISION      TONSILLECTOMY AND ADENOIDECTOMY      UPPER GASTROINTESTINAL ENDOSCOPY            12/26/24 0844   PT Last Visit   PT Visit Date 12/26/24   Note Type   Note type Evaluation   Additional Comments 79 y.o. male  admitted to [facility] on 12/24/2024 with Mesenteric ischemia (HCC).   Pain Assessment   Pain Assessment Tool 0-10   Pain Score 4   Pain Location/Orientation Location: Abdomen   Pain Onset/Description Onset: Ongoing   Effect of Pain on Daily Activities limits functional mobility   Patient's Stated Pain Goal No pain   Hospital Pain Intervention(s) Repositioned;Ambulation/increased activity;Emotional support;Rest   Restrictions/Precautions   Weight Bearing Precautions Per Order No   Other Precautions Chair Alarm;Bed Alarm;Multiple lines;Fall Risk;Pain;O2  (5L O2)   Home Living   Type of Home Apartment  (2nd floor, flight to enter)   Home Layout One level;Stairs to enter with rails  (FFOS to enter)   Bathroom Shower/Tub Tub/shower unit   Bathroom Toilet Standard   Bathroom Equipment Grab bars in shower   Bathroom Accessibility Accessible   Home Equipment Walker  (does not use)   Additional Comments At baseline, pt resides alone in 2nd floor apartment with FFOS to enter and was independent ADLs prior to hospital admission.   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) (09/10/2021), 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 (HCC) (02/24/2023), Basal cell carcinoma, CAD (coronary artery disease), Carotid stenosis, asymptomatic, bilateral, Chronic kidney disease, Colon polyp, Dependence on respirator (ventilator) status (HCC)  "(04/07/2023), Diabetes ([facility]), Diabetes mellitus ([facility]), GERD (gastroesophageal reflux disease), History of nephrolithiasis, Hyperlipidemia, Hypertension, Left foot pain (11/30/2022), Lung cancer ([facility]), PAD (peripheral artery disease) ([facility]), Severe aortic stenosis, and Squamous cell skin cancer (11/22/2022).   Family/Caregiver Present No   Cognition   Overall Cognitive Status WFL   Arousal/Participation Alert   Attention Within functional limits   Orientation Level Oriented X4   Memory Within functional limits   Following Commands Follows one step commands without difficulty   Comments patient pleasant and cooperative, fair insight of deficits and safety awareness   Subjective   Subjective \"I'm doing okay\"   RLE Assessment   RLE Assessment   (4/5 grossly)   LLE Assessment   LLE Assessment   (4/5 grossly)   Bed Mobility   Supine to Sit Unable to assess   Sit to Supine Unable to assess   Additional Comments patient found and left OOB in bedside chair with alarm and all needs met   Transfers   Sit to Stand 4  Minimal assistance   Additional items Assist x 1;Armrests;Increased time required;Verbal cues   Stand to Sit 4  Minimal assistance   Additional items Assist x 1;Armrests;Increased time required;Verbal cues   Additional Comments rw   Ambulation/Elevation   Gait pattern Forward Flexion;Decreased foot clearance;Inconsistent gayatri;Short stride;Step to;Excessively slow   Gait Assistance 4  Minimal assist   Additional items Assist x 1;Verbal cues;Tactile cues   Assistive Device Rolling walker   Distance 60'x2   Stair Management Assistance Not tested   Ambulation/Elevation Additional Comments Patient ambulated 60'x2 with RW, requiring occasional verbal cues for RW management, hallway navigation, and improving stride/step length.   Balance   Static Sitting Good   Dynamic Sitting Fair +   Static Standing Fair   Dynamic Standing Fair -   Ambulatory Poor +   Endurance Deficit   Endurance Deficit Yes   Endurance Deficit " Description generalized weakness, fatigue, pain   Activity Tolerance   Activity Tolerance Patient limited by fatigue   Medical Staff Made Aware LAURA Baxter; This high complexity evaluation was performed with an occupational therapist due to the patient's co-morbidities, clinically unstable presentation, and present impairments which are a regression from the patient's baseline.   Nurse Made Aware RN cleared and updated   Assessment   Prognosis Good   Problem List Decreased strength;Decreased endurance;Impaired balance;Decreased mobility;Pain   Assessment PT completed evaluation of 79 y.o. male admitted [facility] on [facility]/24/2024 with Mesenteric ischemia (HCC). Patient's current status instabilities include multiple lines, NGT, O2, ongoing pain, continuous O2/HR monitoring, regression in function from baseline. 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 ([facility]) (09/10/2021), 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 (HCC) (02/24/2023), Basal cell carcinoma, CAD (coronary artery disease), Carotid stenosis, asymptomatic, bilateral, Chronic kidney disease, Colon polyp, Dependence on respirator (ventilator) status ([facility]) (04/07/2023), Diabetes ([facility]), Diabetes mellitus ([facility]), GERD (gastroesophageal reflux disease), History of nephrolithiasis, Hyperlipidemia, Hypertension, Left foot pain (11/30/2022), Lung cancer ([facility]), PAD (peripheral artery disease) ([facility]), Severe aortic stenosis, and Squamous cell skin cancer (11/22/2022). At baseline, pt resides alone in 2nd floor apartment with FFOS to enter and was independent ADLs prior to hospital admission.       Patient presents at time of PT evaluation functioning below baseline and currently w/ overall mobility deficits due to: impaired balance, decreased endurance, gait dysfunction, decreased activity tolerance and fall risk. During PT evaluation,  patient currently is requiring min assist for functional transfers and  min assist for ambulation with RW. Patient ambulated 60'x2 with RW, requiring occasional verbal cues for RW management, hallway navigation, and improving stride/step length. Pt left OOB in bedside chair with alarm and all needs met.       This patient is functioning below their baseline and is recommended for level III. Patient will benefit from continued skilled PT this admission to achieve maximal function and safety. The patients AM-PAC Basic Mobility Inpatient Short From Raw Score is 16 . Based on AM-PAC scoring and patient presentation, PT currently recommending Level III (Minimum Resource Intensity). Please also refer to the recommendation of the Physical Therapist for safe discharge planning.   Barriers to Discharge Inaccessible home environment  (full flight to enter)   Goals   Patient Goals to reduce abdominal pain   STG Expiration Date 01/09/25   Short Term Goal #1 1) Perform bed mobility mod-I to participate in frequent repositioning and improve skin integrity; 2) Perform functional transfers mod-I to promote I with toileting and OOB mobility; 3) Ambulate 200 feet mod-I with least restrictive device to participate in household and community level mobility; 4) Improve b/l LE strength by 1/2 grade in order to improve efficiency of tranfers; 5) Improve balance by 1 grade to reduce risk for falls; 6) Improve overall activity tolerance to 60 minutes in order to increase patient's ability to engage in mobility tasks; 7) Navigate 12 steps S level in order to safely navigate into the home   PT Treatment Day 0   Plan   Treatment/Interventions ADL retraining;Functional transfer training;LE strengthening/ROM;Elevations;Therapeutic exercise;Endurance training;Patient/family training;Bed mobility;Gait training;Spoke to nursing;Spoke to case management;OT   PT Frequency 2-3x/wk   Discharge Recommendation   Rehab Resource Intensity Level, PT III  (Minimum Resource Intensity)   Equipment Recommended Walker   Walker Package Recommended Wheeled walker   Change/add to Walker Package? No   AM-PAC Basic Mobility Inpatient   Turning in Flat Bed Without Bedrails 3   Lying on Back to Sitting on Edge of Flat Bed Without Bedrails 3   Moving Bed to Chair 3   Standing Up From Chair Using Arms 3   Walk in Room 2   Climb 3-5 Stairs With Railing 2   Basic Mobility Inpatient Raw Score 16   Basic Mobility Standardized Score 38.32   [facility] Highest Level Of Mobility   -[facility] Goal 5: Stand one or more mins   -HLM Achieved 7: Walk 25 feet or more   End of Consult   Patient Position at End of Consult Bedside chair;Bed/Chair alarm activated;All needs within reach         Adelaide Griffiths, PT, DPT

## 2024-12-26 NOTE — OCCUPATIONAL THERAPY NOTE
Occupational Therapy Evaluation     Patient Name: Jay Roach Jr.  Today's Date: 12/26/2024  Problem List  Principal Problem:    Mesenteric ischemia (HCC)  Active Problems:    S/P CABG (coronary artery bypass graft)    Coronary artery disease involving native coronary artery of native heart without angina pectoris    Sinus bradycardia    S/P TAVR (transcatheter aortic valve replacement)    Past Medical History  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 (Formerly [facility]) 09/10/2021    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 (Formerly [facility]) 02/24/2023    Basal cell carcinoma     right cheek    CAD (coronary artery disease)     Carotid stenosis, asymptomatic, bilateral     Chronic kidney disease     Colon polyp     Dependence on respirator (ventilator) status (Formerly [facility]) 04/07/2023    Diabetes (Formerly [facility])     type 2, non-insulin dependent    Diabetes mellitus ([facility])     GERD (gastroesophageal reflux disease)     History of nephrolithiasis     Hyperlipidemia     Hypertension     Left foot pain 11/30/2022    Lung cancer ([facility])     PAD (peripheral artery disease) ([facility])     Severe aortic stenosis     Squamous cell skin cancer 11/22/2022    left superior helix     Past Surgical History  Past Surgical History:   Procedure Laterality Date    AORTA - SUPERIOR MESENTERIC ARTERY BYPASS GRAFT N/A 12/24/2024    Procedure: OPEN MESENTERIC STENTING;  Surgeon: Eagle Higuera MD;  [facility];  Service: Vascular    APPENDECTOMY      ARTERIOGRAM N/A 12/24/2024    Procedure: ARTERIOGRAM;  Surgeon: Eagle Higuera MD;  [facility];  Service: Vascular    CARDIAC CATHETERIZATION N/A 05/05/2022    Procedure: CARDIAC RHC/LHC;  Surgeon: Arvin Sanchez DO;  [facility];  Service: Cardiology    CARDIAC CATHETERIZATION N/A 05/05/2022    Procedure: Cardiac Coronary Angiogram;  Surgeon:  Arvin Sanchez DO;  [facility];  Service: Cardiology    CARDIAC CATHETERIZATION N/A 05/24/2022    Procedure: Cardiac pci;  Surgeon: Damien Jeter MD;  [facility];  Service: Cardiology    CARDIAC CATHETERIZATION N/A 06/14/2022    Procedure: CARDIAC TAVR;  Surgeon: Nahum Vaz MD;  [facility];  Service: Cardiology    COLECTOMY      COLONOSCOPY  2013    CORONARY ARTERY BYPASS GRAFT  2013    X 2    FEMORAL ARTERY - POPLITEAL ARTERY BYPASS GRAFT      HEMORRHOID SURGERY      IR AORTAGRAM WITH RUN-OFF  11/19/2018    IR AORTAGRAM WITH RUN-OFF  03/02/2023    IR BIOPSY LUNG  4/17/2024    IR LOWER EXTREMITY ANGIOGRAM  03/23/2023    IR MESENTERIC/VISCERAL ANGIOGRAM  12/24/2024    MOHS SURGERY Left 01/11/2023    SCC left superior helix-Dr Guy    SC LAPS ABD PRTM&OMENTUM DX W/WO SPEC BR/WA SPX N/A 12/24/2024    Procedure: EXPLORATORY LAPAROTOMY, LYSIS OF ADHESIONS, ABDOMEN CLOSURE;  Surgeon: Alvin Flores MD;  [facility];  Service: General    SC SLCTV CATHJ 3RD+ ORD SLCTV ABDL PEL/LXTR BRNCH Left 08/12/2016    Procedure: LEFT FEMORAL ARTERIOGRAM; BALLOON ANGIOPLASTY; SFA  AND FEMORAL AT VEIN GRAFT;  Surgeon: Nicholas Urena MD;  [facility];  Service: Vascular    SC TEAEC W/WO PATCH GRAFT COMMON FEMORAL Right 03/23/2023    Procedure: Common femoral endarterectomy&antegrade intervention of SFA, popliteal artery w/ shockwave;  Surgeon: Eagle Higuera MD;  [facility];  Service: Vascular    SC TRANSCATHETER TRANSAPICAL REPLACEMT AORTIC VALVE N/A 06/14/2022    Procedure: REPLACEMENT AORTIC VALVE TRANSCATHETER (TAVR) TRANSAPICAL 29MM IRVIN KYLER S3 ULTRA VALVE(ACCESS ON LEFT) ELANA;  Surgeon: Amarjit Gordon DO;  [facility];  Service: Cardiac Surgery    SKIN CANCER EXCISION      TONSILLECTOMY AND ADENOIDECTOMY      UPPER GASTROINTESTINAL ENDOSCOPY            12/26/24 0845   OT Last Visit   OT Visit Date 12/26/24   Note  Type   Note type Evaluation   Pain Assessment   Pain Assessment Tool 0-10   Pain Score 4   Pain Location/Orientation Location: Abdomen   Patient's Stated Pain Goal No pain   Hospital Pain Intervention(s) Repositioned;Ambulation/increased activity;Emotional support   Restrictions/Precautions   Weight Bearing Precautions Per Order No   Other Precautions Chair Alarm;Bed Alarm;Pain;Fall Risk   Home Living   Type of Home Apartment  (2nd floor apt w 1 flight to enter)   Home Layout One level;Stairs to enter with rails  (full flight to enter)   Bathroom Shower/Tub Tub/shower unit   Bathroom Toilet Standard   Bathroom Equipment Grab bars in shower   Bathroom Accessibility Accessible   Home Equipment Walker  (not using pta)   Prior Function   Level of [place] Independent with ADLs;Independent with functional mobility;Needs assistance with functional mobility   Lives With Alone   Receives Help From Family   IADLs Independent with meal prep;Independent with medication management;Family/Friend/Other provides transportation   Falls in the last 6 months 0   Vocational Retired   Lifestyle   Autonomy pta, pt was I W ADL, rq A for IADL and was not using ad for mobility. -    Reciprocal Relationships supportive son who lives downstairs, brother owns property and lives next door.   Service to Others retired   Intrinsic Gratification spending time w cat   ADL   Where Assessed Edge of bed   Eating Assistance 6  Modified independent   Grooming Assistance 5  Supervision/Setup   UB Bathing Assistance 5  Supervision/Setup   LB Bathing Assistance 4  Minimal Assistance   UB Dressing Assistance 5  Supervision/Setup   LB Dressing Assistance 4  Minimal Assistance   Toileting Assistance  4  Minimal Assistance   Functional Assistance 4  Minimal Assistance   Transfers   Sit to Stand 4  Minimal assistance   Additional items Assist x 1;Increased time required;Verbal cues   Stand to Sit 4  Minimal assistance   Additional items Assist x  1;Increased time required;Verbal cues   Additional Comments rw. found and left in chair w all needs in reach and alarm on.   Functional Mobility   Functional Mobility 4  Minimal assistance   Additional Comments ax1, household distance   Additional items Rolling walker   Balance   Static Sitting Good   Dynamic Sitting Fair +   Static Standing Fair   Dynamic Standing Fair -   Ambulatory Poor +   Activity Tolerance   Activity Tolerance Patient limited by fatigue   Medical Staff Made Aware dpt 2' pts med complexity, comorbidities and regression from baseline.   Nurse Made Aware cleared   RUE Assessment   RUE Assessment WFL   LUE Assessment   LUE Assessment WFL   Hand Function   Gross Motor Coordination Functional   Fine Motor Coordination Functional   Cognition   Overall Cognitive Status WFL   Arousal/Participation Alert;Responsive;Cooperative   Attention Within functional limits   Orientation Level Oriented X4   Memory Within functional limits   Following Commands Follows all commands and directions without difficulty   Comments pt cooperative w overall fair safety awareness and insight to condition t/o session.   Assessment   Limitation Decreased ADL status;Decreased endurance;Decreased self-care trans;Decreased high-level ADLs   Prognosis Good   Assessment Pt is a 79 y.o. male  admitted 12/24/24 w abdominal pain, pneumatosis in small bowel in RLQ and calcified SMA. Pt underwent ex-alp w retrograde open SMA stent 12/24/24. Pod 1 w active OT eval and treat orders. PMH includes  has a past medical history of 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), 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 (HCC), Basal cell carcinoma, CAD (coronary artery disease), Carotid stenosis, asymptomatic, bilateral, Chronic kidney disease, Colon polyp, Dependence on respirator (ventilator) status (HCC), Diabetes (HCC), Diabetes mellitus (HCC), GERD  (gastroesophageal reflux disease), History of nephrolithiasis, Hyperlipidemia, Hypertension, Left foot pain, Lung cancer (HCC), PAD (peripheral artery disease) (HCC), Severe aortic stenosis, and Squamous cell skin cancer. Pt lives alone in an apt w full flight to enter, has tub shower with grab bars and standard toilet. Pta, pt was independent w/ ADL, rq a for IADL and did not use AD for functional mobility, was not driving. Currently, pt is Supervision for UB ADL, Min Ax1 for LB ADL, and completed transfers/FM w Min Ax1. Pt is limited at this time 2* decreased endurance/activtiy tolerance, decreased cognition, decreased ADL/High-level ADL status, decreased self-care trans, decreased safety awareness, limited home support and is a fall risk. This impacts pt's ability to complete UB and LB dressing and bathing, toileting, transfers, functional mobility, community mobility, home and health maintenance, and safe engagement in typical daily routine. The patient's raw score on the AM-PAC Daily Activity inpatient short form is 21, standardized score is 44.27, greater than 39.4. Patients at this level are likely to benefit from discharge to home. Please refer to the recommendation of the Occupational Therapist for safe discharge planning.  From OT standpoint, pt should D/C to level 3  when medically stable. Pt will benefit from continued acute OT services 2-3 x/wk for 10-14 days to meet goals.   Goals   Patient Goals get better   LTG Time Frame 10-14   Long Term Goal #1 see below   Plan   Treatment Interventions ADL retraining;Functional transfer training;Endurance training;Patient/family training;Equipment evaluation/education;Compensatory technique education;Energy conservation;Activityengagement   Goal Expiration Date 01/09/25   OT Frequency 2-3x/wk   Discharge Recommendation   Rehab Resource Intensity Level, OT III (Minimum Resource Intensity)   AM-PAC Daily Activity Inpatient   Lower Body Dressing 3   Bathing 3    Toileting 3   Upper Body Dressing 4   Grooming 4   Eating 4   Daily Activity Raw Score 21   Daily Activity Standardized Score (Calc for Raw Score >=11) 44.27   AM-PAC Applied Cognition Inpatient   Following a Speech/Presentation 4   Understanding Ordinary Conversation 4   Taking Medications 4   Remembering Where Things Are Placed or Put Away 3   Remembering List of 4-5 Errands 3   Taking Care of Complicated Tasks 3   Applied Cognition Raw Score 21   Applied Cognition Standardized Score 44.3   Modified [place] Scale   Modified [place] Scale 4   End of Consult   Education Provided Yes   Patient Position at End of Consult Bedside chair;Bed/Chair alarm activated;All needs within reach   Nurse Communication Nurse aware of consult     Pt will complete functional mobility with Mod I using appropriate DME as needed.     Pt will complete UB dressing and bathing with Mod I using appropriate DME as needed.     Pt will complete LB dressing and bathing with Mod I using appropriate DME as needed.    Pt will complete transfers with Mod I using appropriate DME as needed.     Pt will complete toileting with Mod I using appropriate DME as needed.     Pt will complete home maintenance task with Mod I using appropriate DME as needed.     Pt will utilize energy conservation techniques throughout functional activity/ADL s/p skilled education.     Pt will demonstrate increased safety awareness during functional tasks/ADL's s/p skilled education.     Pt will increase activity tolerance to 30 minutes in order to complete ADL's/ functional tasks, using appropriate DME as needed.         Vee Malik, DESTINEE, OTR/L

## 2024-12-27 LAB
ANION GAP SERPL CALCULATED.3IONS-SCNC: 7 MMOL/L (ref 4–13)
BUN SERPL-MCNC: 43 MG/DL (ref 5–25)
CALCIUM SERPL-MCNC: 8.2 MG/DL (ref 8.4–10.2)
CHLORIDE SERPL-SCNC: 109 MMOL/L (ref 96–108)
CO2 SERPL-SCNC: 24 MMOL/L (ref 21–32)
CREAT SERPL-MCNC: 2.4 MG/DL (ref 0.6–1.3)
GFR SERPL CREATININE-BSD FRML MDRD: 24 ML/MIN/1.73SQ M
GLUCOSE SERPL-MCNC: 140 MG/DL (ref 65–140)
GLUCOSE SERPL-MCNC: 149 MG/DL (ref 65–140)
GLUCOSE SERPL-MCNC: 153 MG/DL (ref 65–140)
GLUCOSE SERPL-MCNC: 179 MG/DL (ref 65–140)
GLUCOSE SERPL-MCNC: 193 MG/DL (ref 65–140)
GLUCOSE SERPL-MCNC: 205 MG/DL (ref 65–140)
POTASSIUM SERPL-SCNC: 3.7 MMOL/L (ref 3.5–5.3)
SODIUM SERPL-SCNC: 140 MMOL/L (ref 135–147)

## 2024-12-27 PROCEDURE — 99232 SBSQ HOSP IP/OBS MODERATE 35: CPT | Performed by: INTERNAL MEDICINE

## 2024-12-27 PROCEDURE — 82948 REAGENT STRIP/BLOOD GLUCOSE: CPT

## 2024-12-27 PROCEDURE — 80048 BASIC METABOLIC PNL TOTAL CA: CPT

## 2024-12-27 PROCEDURE — NC001 PR NO CHARGE: Performed by: SURGERY

## 2024-12-27 RX ORDER — AMOXICILLIN 250 MG
1 CAPSULE ORAL
Status: DISCONTINUED | OUTPATIENT
Start: 2024-12-27 | End: 2025-01-03 | Stop reason: HOSPADM

## 2024-12-27 RX ADMIN — NICOTINE 1 PATCH: 14 PATCH, EXTENDED RELEASE TRANSDERMAL at 08:52

## 2024-12-27 RX ADMIN — Medication 6.25 MG: at 21:16

## 2024-12-27 RX ADMIN — ONDANSETRON 4 MG: 2 INJECTION INTRAMUSCULAR; INTRAVENOUS at 10:42

## 2024-12-27 RX ADMIN — Medication 6.25 MG: at 08:49

## 2024-12-27 RX ADMIN — HYDRALAZINE HYDROCHLORIDE 25 MG: 25 TABLET ORAL at 21:16

## 2024-12-27 RX ADMIN — CLOPIDOGREL BISULFATE 75 MG: 75 TABLET ORAL at 08:49

## 2024-12-27 RX ADMIN — HYDRALAZINE HYDROCHLORIDE 25 MG: 25 TABLET ORAL at 08:50

## 2024-12-27 RX ADMIN — HEPARIN SODIUM 5000 UNITS: 5000 INJECTION, SOLUTION INTRAVENOUS; SUBCUTANEOUS at 21:13

## 2024-12-27 RX ADMIN — HEPARIN SODIUM 5000 UNITS: 5000 INJECTION, SOLUTION INTRAVENOUS; SUBCUTANEOUS at 16:06

## 2024-12-27 RX ADMIN — HEPARIN SODIUM 5000 UNITS: 5000 INJECTION, SOLUTION INTRAVENOUS; SUBCUTANEOUS at 05:39

## 2024-12-27 RX ADMIN — ACETAMINOPHEN 975 MG: 325 TABLET, FILM COATED ORAL at 05:39

## 2024-12-27 RX ADMIN — AMLODIPINE BESYLATE 10 MG: 10 TABLET ORAL at 08:50

## 2024-12-27 RX ADMIN — ACETAMINOPHEN 975 MG: 325 TABLET, FILM COATED ORAL at 21:14

## 2024-12-27 RX ADMIN — SENNOSIDES AND DOCUSATE SODIUM 1 TABLET: 50; 8.6 TABLET ORAL at 21:14

## 2024-12-27 RX ADMIN — INSULIN LISPRO 1 UNITS: 100 INJECTION, SOLUTION INTRAVENOUS; SUBCUTANEOUS at 16:10

## 2024-12-27 RX ADMIN — ONDANSETRON 4 MG: 2 INJECTION INTRAMUSCULAR; INTRAVENOUS at 20:37

## 2024-12-27 RX ADMIN — ACETAMINOPHEN 975 MG: 325 TABLET, FILM COATED ORAL at 16:06

## 2024-12-27 RX ADMIN — INSULIN LISPRO 1 UNITS: 100 INJECTION, SOLUTION INTRAVENOUS; SUBCUTANEOUS at 10:48

## 2024-12-27 RX ADMIN — PANTOPRAZOLE SODIUM 40 MG: 40 TABLET, DELAYED RELEASE ORAL at 05:39

## 2024-12-27 RX ADMIN — ATORVASTATIN CALCIUM 80 MG: 80 TABLET, FILM COATED ORAL at 21:14

## 2024-12-27 RX ADMIN — LISINOPRIL 5 MG: 5 TABLET ORAL at 08:50

## 2024-12-27 NOTE — PROGRESS NOTES
Progress Note - Cardiology   Name: Jay Roach Jr. 79 y.o. male I MRN: [id number]  Unit/Bed#: [facility] 520-01 I Date of Admission: 12/24/2024   Date of Service: 12/27/2024 I Hospital Day: 3     Assessment & Plan  Mesenteric ischemia (HCC)  Per vascular surgery. Plavix resumed.  Coronary artery disease involving native coronary artery of native heart without angina pectoris  No recent angina. He has had PCI and CABG in the past. Antiplatelet agent with Plavix. When on dual antiplatelet therapy, he did have GI bleeding.    Resumed oral meds as previously ordered for outpatient as BP is stable  S/P CABG (coronary artery bypass graft)    S/P TAVR (transcatheter aortic valve replacement)  Normal bioprosthetic valve function on his last echocardiogram  Sinus bradycardia  HR is stable. OK to resume low dose oral BB.    Decreased oxygen requirement, but still needs this. BMP not drawn. He has CKD. Likely could use another dose of IV Lasix today to help wean off of oxygen. BMP would be helpful to guide to make sure not developing any acute kidney injury.    Subjective   Still on O2, but lower requirement after the lasix yesterday.  No BMP today.    Objective :  Temp:  [97.7 °F (36.5 °C)-98.9 °F (37.2 °C)] 97.7 °F (36.5 °C)  HR:  [71-80] 79  BP: (131-186)/(47-59) 147/55  Resp:  [15-19] 15  SpO2:  [88 %-95 %] 93 %  O2 Device: Nasal cannula  Nasal Cannula O2 Flow Rate (L/min):  [5 L/min] 5 L/min  Orthostatic Blood Pressures      Flowsheet Row Most Recent Value   Blood Pressure 147/55 filed at 12/27/2024 0727   Patient Position - Orthostatic VS Lying filed at 12/27/2024 0242          First Weight: Weight - Scale: 77.8 kg (171 lb 8.3 oz) (12/24/24 1032)  Vitals:    12/24/24 1032 12/25/24 0600   Weight: 77.8 kg (171 lb 8.3 oz) 79 kg (174 lb 2.6 oz)     Physical Exam  Constitutional:       General: He is not in acute distress.     Appearance: He is well-developed.   Eyes:      General: No scleral icterus.     Conjunctiva/sclera:  "Conjunctivae normal.   Neck:      Vascular: No JVD.   Cardiovascular:      Rate and Rhythm: Normal rate and regular rhythm.      Heart sounds: Murmur heard.      No friction rub. No gallop.      Comments: Bio avr  Pulmonary:      Effort: Pulmonary effort is normal.      Breath sounds: Normal breath sounds. No wheezing or rales.   Chest:      Chest wall: No tenderness.   Abdominal:      General: A surgical scar is present.      Tenderness: There is abdominal tenderness.   Musculoskeletal:         General: Normal range of motion.      Cervical back: Normal range of motion and neck supple.   Skin:     General: Skin is warm and dry.      Findings: No erythema or rash.   Neurological:      Mental Status: He is alert and oriented to person, place, and time.   Psychiatric:         Behavior: Behavior normal.           Lab Results: I have reviewed the following results:CBC/BMP: No new results in last 24 hours.   Results from last 7 days   Lab Units 12/26/24  0558 12/25/24  0848 12/25/24  0323 12/24/24  2103 12/24/24  1636   WBC Thousand/uL 10.12  --  12.58*  --  16.68*   HEMOGLOBIN g/dL 8.3* 8.3* 8.4*   < > 9.2*   HEMATOCRIT % 26.1*  --  25.5*  --  28.1*   PLATELETS Thousands/uL 131*  --  130*  --  147*    < > = values in this interval not displayed.     Results from last 7 days   Lab Units 12/26/24  0558 12/25/24  0342 12/24/24  1636 12/24/24  1527   POTASSIUM mmol/L 3.9 4.3 3.7  --    CHLORIDE mmol/L 112* 112* 113*  --    CO2 mmol/L 25 24 21  --    CO2, I-STAT mmol/L  --   --   --  24   BUN mg/dL 38* 35* 36*  --    CREATININE mg/dL 1.85* 1.84* 1.75*  --    GLUCOSE, ISTAT mg/dl  --   --   --  123   CALCIUM mg/dL 8.5 8.4 7.7*  --      Results from last 7 days   Lab Units 12/24/24  1636 12/24/24  0603   INR  1.29* 1.02   PTT seconds 132* 28     Lab Results   Component Value Date    HGBA1C 6.6 (H) 12/24/2024     No results found for: \"CKTOTAL\", \"CKMB\", \"CKMBINDEX\", \"TROPONINI\"    "

## 2024-12-27 NOTE — ASSESSMENT & PLAN NOTE
No recent angina. He has had PCI and CABG in the past. Antiplatelet agent with Plavix. When on dual antiplatelet therapy, he did have GI bleeding.    Resumed oral meds as previously ordered for outpatient as BP is stable

## 2024-12-27 NOTE — ASSESSMENT & PLAN NOTE
Patient is an 79 year old M who presented with portal venous gas highly suspicious for mesenteric ischemia with calcified aortic stenosis proximal to the SMA and celiac artery. Patient s/p exploratory laparotomy with retrograde open SMA stent on 12/14.  The bowel was noted to be pink and well-perfused intraoperatively.  Patient had episode of hypoxia yesterday requiring utilization of supplemental O2.  Respiratory status improved following IV Lasix 40 mg    Afebrile, intermittent hypertensive on 5 L nasal cannula.  He received a dose of 40 mg Lasix, which decreases oxygen demands at 2 L.    Plan  -Regular diet  -Continue Plavix 75 mg daily  -Protonix  -Encourage ambulation/out of bed, 3 times daily  -Encourage incentive spirometer use, 10 times per hour  -PT/OT  -Wean supplemental O2  -If patient able to tolerate being on room air, can potentially be discharged today

## 2024-12-27 NOTE — PLAN OF CARE
Problem: PAIN - ADULT  Goal: Verbalizes/displays adequate comfort level or baseline comfort level  Description: Interventions:  - Encourage patient to monitor pain and request assistance  - Assess pain using appropriate pain scale  - Administer analgesics based on type and severity of pain and evaluate response  - Implement non-pharmacological measures as appropriate and evaluate response  - Consider cultural and social influences on pain and pain management  - Notify physician/advanced practitioner if interventions unsuccessful or patient reports new pain  Outcome: Progressing     Problem: INFECTION - ADULT  Goal: Absence or prevention of progression during hospitalization  Description: INTERVENTIONS:  - Assess and monitor for signs and symptoms of infection  - Monitor lab/diagnostic results  - Monitor all insertion sites, i.e. indwelling lines, tubes, and drains  - Monitor endotracheal if appropriate and nasal secretions for changes in amount and color  - [place] appropriate cooling/warming therapies per order  - Administer medications as ordered  - Instruct and encourage patient and family to use good hand hygiene technique  - Identify and instruct in appropriate isolation precautions for identified infection/condition  Outcome: Progressing     Problem: SAFETY ADULT  Goal: Patient will remain free of falls  Description: INTERVENTIONS:  - Educate patient/family on patient safety including physical limitations  - Instruct patient to call for assistance with activity   - Consult OT/PT to assist with strengthening/mobility   - Keep Call bell within reach  - Keep bed low and locked with side rails adjusted as appropriate  - Keep care items and personal belongings within reach  - Initiate and maintain comfort rounds  - Make Fall Risk Sign visible to staff  - Offer Toileting every  Hours, in advance of need  - Initiate/Maintain alarm  - Obtain necessary fall risk management equipment  - Apply yellow socks and  bracelet for high fall risk patients  - Consider moving patient to room near nurses station  Outcome: Progressing     Problem: DISCHARGE PLANNING  Goal: Discharge to home or other facility with appropriate resources  Description: INTERVENTIONS:  - Identify barriers to discharge w/patient and caregiver  - Arrange for needed discharge resources and transportation as appropriate  - Identify discharge learning needs (meds, wound care, etc.)  - Arrange for interpretive services to assist at discharge as needed  - Refer to Case Management Department for coordinating discharge planning if the patient needs post-hospital services based on physician/advanced practitioner order or complex needs related to functional status, cognitive ability, or social support system  Outcome: Progressing     Problem: Knowledge Deficit  Goal: Patient/family/caregiver demonstrates understanding of disease process, treatment plan, medications, and discharge instructions  Description: Complete learning assessment and assess knowledge base.  Interventions:  - Provide teaching at level of understanding  - Provide teaching via preferred learning methods  Outcome: Progressing     Problem: CARDIOVASCULAR - ADULT  Goal: Maintains optimal cardiac output and hemodynamic stability  Description: INTERVENTIONS:  - Monitor I/O, vital signs and rhythm  - Monitor for S/S and trends of decreased cardiac output  - Administer and titrate ordered vasoactive medications to optimize hemodynamic stability  - Assess quality of pulses, skin color and temperature  - Assess for signs of decreased coronary artery perfusion  - Instruct patient to report change in severity of symptoms  Outcome: Progressing  Goal: Absence of cardiac dysrhythmias or at baseline rhythm  Description: INTERVENTIONS:  - Continuous cardiac monitoring, vital signs, obtain 12 lead EKG if ordered  - Administer antiarrhythmic and heart rate control medications as ordered  - Monitor electrolytes and  administer replacement therapy as ordered  Outcome: Progressing     Problem: RESPIRATORY - ADULT  Goal: Achieves optimal ventilation and oxygenation  Description: INTERVENTIONS:  - Assess for changes in respiratory status  - Assess for changes in mentation and behavior  - Position to facilitate oxygenation and minimize respiratory effort  - Oxygen administered by appropriate delivery if ordered  - Initiate smoking cessation education as indicated  - Encourage broncho-pulmonary hygiene including cough, deep breathe, Incentive Spirometry  - Assess the need for suctioning and aspirate as needed  - Assess and instruct to report SOB or any respiratory difficulty  - Respiratory Therapy support as indicated  Outcome: Progressing     Problem: GASTROINTESTINAL - ADULT  Goal: Minimal or absence of nausea and/or vomiting  Description: INTERVENTIONS:  - Administer IV fluids if ordered to ensure adequate hydration  - Maintain NPO status until nausea and vomiting are resolved  - Nasogastric tube if ordered  - Administer ordered antiemetic medications as needed  - Provide nonpharmacologic comfort measures as appropriate  - Advance diet as tolerated, if ordered  - Consider nutrition services referral to assist patient with adequate nutrition and appropriate food choices  Outcome: Progressing  Goal: Maintains or returns to baseline bowel function  Description: INTERVENTIONS:  - Assess bowel function  - Encourage oral fluids to ensure adequate hydration  - Administer IV fluids if ordered to ensure adequate hydration  - Administer ordered medications as needed  - Encourage mobilization and activity  - Consider nutritional services referral to assist patient with adequate nutrition and appropriate food choices  Outcome: Progressing  Goal: Maintains adequate nutritional intake  Description: INTERVENTIONS:  - Monitor percentage of each meal consumed  - Identify factors contributing to decreased intake, treat as appropriate  - Assist with  meals as needed  - Monitor I&O, weight, and lab values if indicated  - Obtain nutrition services referral as needed  Outcome: Progressing     Problem: GENITOURINARY - ADULT  Goal: Maintains or returns to baseline urinary function  Description: INTERVENTIONS:  - Assess urinary function  - Encourage oral fluids to ensure adequate hydration if ordered  - Administer IV fluids as ordered to ensure adequate hydration  - Administer ordered medications as needed  - Offer frequent toileting  - Follow urinary retention protocol if ordered  Outcome: Progressing     Problem: METABOLIC, FLUID AND ELECTROLYTES - ADULT  Goal: Electrolytes maintained within normal limits  Description: INTERVENTIONS:  - Monitor labs and assess patient for signs and symptoms of electrolyte imbalances  - Administer electrolyte replacement as ordered  - Monitor response to electrolyte replacements, including repeat lab results as appropriate  - Instruct patient on fluid and nutrition as appropriate  Outcome: Progressing  Goal: Fluid balance maintained  Description: INTERVENTIONS:  - Monitor labs   - Monitor I/O and WT  - Instruct patient on fluid and nutrition as appropriate  - Assess for signs & symptoms of volume excess or deficit  Outcome: Progressing     Problem: HEMATOLOGIC - ADULT  Goal: Maintains hematologic stability  Description: INTERVENTIONS  - Assess for signs and symptoms of bleeding or hemorrhage  - Monitor labs  - Administer supportive blood products/factors as ordered and appropriate  Outcome: Progressing     Problem: Prexisting or High Potential for Compromised Skin Integrity  Goal: Skin integrity is maintained or improved  Description: INTERVENTIONS:  - Identify patients at risk for skin breakdown  - Assess and monitor skin integrity  - Assess and monitor nutrition and hydration status  - Monitor labs   - Assess for incontinence   - Turn and reposition patient  - Assist with mobility/ambulation  - Relieve pressure over bony  prominences  - Avoid friction and shearing  - Provide appropriate hygiene as needed including keeping skin clean and dry  - Evaluate need for skin moisturizer/barrier cream  - Collaborate with interdisciplinary team   - Patient/family teaching  - Consider wound care consult   Outcome: Progressing

## 2024-12-27 NOTE — PROGRESS NOTES
Progress Note - Surgery-General   Name: Jay Roach Jr. 79 y.o. 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 Patient s/p exploratory laparotomy with retrograde open SMA stent on 12/14.  The bowel was noted to be pink and well-perfused intraoperatively.  Patient had episode of hypoxia yesterday requiring utilization of supplemental O2.  Respiratory status improved following IV Lasix 40 mg    Afebrile, intermittent hypertensive on 5 L nasal cannula.  He received a dose of 40 mg Lasix, which decreases oxygen demands at 2 L.    Plan  -Regular diet  -Continue Plavix 75 mg daily  -Protonix  -Encourage ambulation/out of bed, 3 times daily  -Encourage incentive spirometer use, 10 times per hour  -PT/OT  -Wean supplemental O2  -If patient able to tolerate being on room air, can potentially be discharged today      Please contact the SecureChat role for the Surgery-General service with any questions/concerns.    Subjective   Patient feels well.  Denies any abdominal pain.  Having flatus but no bowel movements.    Objective :  Temp:  [97.7 °F (36.5 °C)-99.8 °F (37.7 °C)] 97.7 °F (36.5 °C)  HR:  [70-80] 79  BP: (131-186)/(47-59) 147/55  Resp:  [15-19] 15  SpO2:  [88 %-95 %] 93 %  O2 Device: Nasal cannula  Nasal Cannula O2 Flow Rate (L/min):  [5 L/min] 5 L/min    I/O         12/25 0701  12/26 0700 12/26 0701  12/27 0700 12/27 0701  12/28 0700    P.O. 0 1420     I.V. (mL/kg) 1381.9 (17.5) 0 (0)     Blood       NG/GT 0      IV Piggyback       Total Intake(mL/kg) 1381.9 (17.5) 1420 (18)     Urine (mL/kg/hr) 1280 (0.7) 1045 (0.6) 100 (1)    Emesis/NG output 0      Blood       Total Output 1280 1045 100    Net +101.9 +375  -100                   Physical Exam  Vitals reviewed.   HENT:      Head: Normocephalic.      Mouth/Throat:      Mouth: Mucous membranes are moist.   Eyes:      Pupils: Pupils are equal, round, and reactive to light.   Cardiovascular:      Rate and Rhythm: Normal rate.   Pulmonary:      Effort: Pulmonary effort is normal.      Comments: 2 L NC  Abdominal:      General: Abdomen is flat. There is no distension.      Palpations: Abdomen is soft.      Comments: Surgical site is dry and intact with Mepilex dressing in place   Musculoskeletal:         General: Normal range of motion.   Neurological:      General: No focal deficit present.      Mental Status: He is alert.           Lab Results: I have reviewed the following results:  Recent Labs     12/24/24  1636 12/24/24  2103 12/25/24  0329 12/25/24  0342 12/25/24  1103 12/25/24  1308 12/26/24  0558   WBC 16.68*   < >  --   --   --   --  10.12   HGB 9.2*   < >  --    < >  --   --  8.3*   HCT 28.1*   < >  --   --   --   --  26.1*   *   < >  --   --   --   --  131*   SODIUM 142  --   --    < >  --   --  145   K 3.7  --   --    < >  --   --  3.9   *  --   --    < >  --   --  112*   CO2 21  --   --    < >  --   --  25   BUN 36*  --   --    < >  --   --  38*   CREATININE 1.75*  --   --    < >  --   --  1.85*   GLUC 179*  --   --    < >  --   --  131   CAIONIZED 1.08*  --   --   --   --   --   --    MG 1.4*  --   --    < >  --   --  2.3   PHOS 3.3  --   --    < >  --   --  3.1   AST 9*  --   --   --   --   --   --    ALT 5*  --   --   --   --   --   --    ALB 2.8*  --   --   --   --   --   --    TBILI 0.43  --   --   --   --   --   --    ALKPHOS 56  --   --   --   --   --   --    *  --   --   --   --   --   --    INR 1.29*  --   --   --   --   --   --    HSTNI0  --   --   --   --  107*  --   --    HSTNI2  --   --   --   --   --  97*  --    LACTICACID 1.7  --  0.9  --   --   --   --     < > = values in this interval not displayed.       Imaging Results  Review: No pertinent imaging studies reviewed.  Other Study Results Review: No additional pertinent studies reviewed.    VTE Pharmacologic Prophylaxis: VTE covered by:  heparin (porcine), Subcutaneous, 5,000 Units at 12/27/24 0539     VTE Mechanical Prophylaxis: sequential compression device

## 2024-12-28 LAB
ANION GAP SERPL CALCULATED.3IONS-SCNC: 10 MMOL/L (ref 4–13)
ANION GAP SERPL CALCULATED.3IONS-SCNC: 7 MMOL/L (ref 4–13)
BASOPHILS # BLD AUTO: 0.01 THOUSANDS/ÂΜL (ref 0–0.1)
BASOPHILS NFR BLD AUTO: 0 % (ref 0–1)
BUN SERPL-MCNC: 46 MG/DL (ref 5–25)
BUN SERPL-MCNC: 48 MG/DL (ref 5–25)
CALCIUM SERPL-MCNC: 8.2 MG/DL (ref 8.4–10.2)
CALCIUM SERPL-MCNC: 8.3 MG/DL (ref 8.4–10.2)
CHLORIDE SERPL-SCNC: 106 MMOL/L (ref 96–108)
CHLORIDE SERPL-SCNC: 107 MMOL/L (ref 96–108)
CO2 SERPL-SCNC: 23 MMOL/L (ref 21–32)
CO2 SERPL-SCNC: 25 MMOL/L (ref 21–32)
CREAT SERPL-MCNC: 2.52 MG/DL (ref 0.6–1.3)
CREAT SERPL-MCNC: 2.59 MG/DL (ref 0.6–1.3)
EOSINOPHIL # BLD AUTO: 0.04 THOUSAND/ÂΜL (ref 0–0.61)
EOSINOPHIL NFR BLD AUTO: 1 % (ref 0–6)
ERYTHROCYTE [DISTWIDTH] IN BLOOD BY AUTOMATED COUNT: 14.8 % (ref 11.6–15.1)
GFR SERPL CREATININE-BSD FRML MDRD: 22 ML/MIN/1.73SQ M
GFR SERPL CREATININE-BSD FRML MDRD: 22 ML/MIN/1.73SQ M
GLUCOSE SERPL-MCNC: 137 MG/DL (ref 65–140)
GLUCOSE SERPL-MCNC: 142 MG/DL (ref 65–140)
GLUCOSE SERPL-MCNC: 145 MG/DL (ref 65–140)
GLUCOSE SERPL-MCNC: 148 MG/DL (ref 65–140)
GLUCOSE SERPL-MCNC: 177 MG/DL (ref 65–140)
GLUCOSE SERPL-MCNC: 214 MG/DL (ref 65–140)
HCT VFR BLD AUTO: 28.1 % (ref 36.5–49.3)
HGB BLD-MCNC: 8.9 G/DL (ref 12–17)
IMM GRANULOCYTES # BLD AUTO: 0.03 THOUSAND/UL (ref 0–0.2)
IMM GRANULOCYTES NFR BLD AUTO: 0 % (ref 0–2)
LYMPHOCYTES # BLD AUTO: 0.65 THOUSANDS/ÂΜL (ref 0.6–4.47)
LYMPHOCYTES NFR BLD AUTO: 9 % (ref 14–44)
MCH RBC QN AUTO: 30.9 PG (ref 26.8–34.3)
MCHC RBC AUTO-ENTMCNC: 31.7 G/DL (ref 31.4–37.4)
MCV RBC AUTO: 98 FL (ref 82–98)
MONOCYTES # BLD AUTO: 0.36 THOUSAND/ÂΜL (ref 0.17–1.22)
MONOCYTES NFR BLD AUTO: 5 % (ref 4–12)
NEUTROPHILS # BLD AUTO: 6.33 THOUSANDS/ÂΜL (ref 1.85–7.62)
NEUTS SEG NFR BLD AUTO: 85 % (ref 43–75)
NRBC BLD AUTO-RTO: 0 /100 WBCS
PLATELET # BLD AUTO: 155 THOUSANDS/UL (ref 149–390)
PMV BLD AUTO: 11.2 FL (ref 8.9–12.7)
POTASSIUM SERPL-SCNC: 3.7 MMOL/L (ref 3.5–5.3)
POTASSIUM SERPL-SCNC: 3.8 MMOL/L (ref 3.5–5.3)
RBC # BLD AUTO: 2.88 MILLION/UL (ref 3.88–5.62)
SODIUM SERPL-SCNC: 138 MMOL/L (ref 135–147)
SODIUM SERPL-SCNC: 140 MMOL/L (ref 135–147)
WBC # BLD AUTO: 7.42 THOUSAND/UL (ref 4.31–10.16)

## 2024-12-28 PROCEDURE — 80048 BASIC METABOLIC PNL TOTAL CA: CPT

## 2024-12-28 PROCEDURE — 85025 COMPLETE CBC W/AUTO DIFF WBC: CPT

## 2024-12-28 PROCEDURE — 80048 BASIC METABOLIC PNL TOTAL CA: CPT | Performed by: SURGERY

## 2024-12-28 PROCEDURE — 82948 REAGENT STRIP/BLOOD GLUCOSE: CPT

## 2024-12-28 PROCEDURE — 99232 SBSQ HOSP IP/OBS MODERATE 35: CPT | Performed by: INTERNAL MEDICINE

## 2024-12-28 PROCEDURE — 99024 POSTOP FOLLOW-UP VISIT: CPT | Performed by: STUDENT IN AN ORGANIZED HEALTH CARE EDUCATION/TRAINING PROGRAM

## 2024-12-28 RX ORDER — SODIUM CHLORIDE, SODIUM GLUCONATE, SODIUM ACETATE, POTASSIUM CHLORIDE, MAGNESIUM CHLORIDE, SODIUM PHOSPHATE, DIBASIC, AND POTASSIUM PHOSPHATE .53; .5; .37; .037; .03; .012; .00082 G/100ML; G/100ML; G/100ML; G/100ML; G/100ML; G/100ML; G/100ML
500 INJECTION, SOLUTION INTRAVENOUS ONCE
Status: DISCONTINUED | OUTPATIENT
Start: 2024-12-28 | End: 2024-12-28

## 2024-12-28 RX ORDER — TORSEMIDE 10 MG/1
10 TABLET ORAL DAILY
Status: DISCONTINUED | OUTPATIENT
Start: 2024-12-28 | End: 2024-12-29

## 2024-12-28 RX ORDER — BISACODYL 10 MG
10 SUPPOSITORY, RECTAL RECTAL ONCE
Status: COMPLETED | OUTPATIENT
Start: 2024-12-28 | End: 2024-12-28

## 2024-12-28 RX ORDER — ALBUMIN HUMAN 50 G/1000ML
25 SOLUTION INTRAVENOUS ONCE
Status: COMPLETED | OUTPATIENT
Start: 2024-12-28 | End: 2024-12-28

## 2024-12-28 RX ORDER — METOCLOPRAMIDE HYDROCHLORIDE 5 MG/ML
10 INJECTION INTRAMUSCULAR; INTRAVENOUS EVERY 6 HOURS PRN
Status: DISCONTINUED | OUTPATIENT
Start: 2024-12-28 | End: 2025-01-03 | Stop reason: HOSPADM

## 2024-12-28 RX ADMIN — AMLODIPINE BESYLATE 10 MG: 10 TABLET ORAL at 08:11

## 2024-12-28 RX ADMIN — ATORVASTATIN CALCIUM 80 MG: 80 TABLET, FILM COATED ORAL at 21:12

## 2024-12-28 RX ADMIN — SENNOSIDES AND DOCUSATE SODIUM 1 TABLET: 50; 8.6 TABLET ORAL at 21:12

## 2024-12-28 RX ADMIN — HYDRALAZINE HYDROCHLORIDE 25 MG: 25 TABLET ORAL at 08:11

## 2024-12-28 RX ADMIN — ONDANSETRON 4 MG: 2 INJECTION INTRAMUSCULAR; INTRAVENOUS at 03:07

## 2024-12-28 RX ADMIN — INSULIN LISPRO 1 UNITS: 100 INJECTION, SOLUTION INTRAVENOUS; SUBCUTANEOUS at 21:11

## 2024-12-28 RX ADMIN — HYDRALAZINE HYDROCHLORIDE 25 MG: 25 TABLET ORAL at 21:12

## 2024-12-28 RX ADMIN — TORSEMIDE 10 MG: 10 TABLET ORAL at 11:15

## 2024-12-28 RX ADMIN — HEPARIN SODIUM 5000 UNITS: 5000 INJECTION, SOLUTION INTRAVENOUS; SUBCUTANEOUS at 21:11

## 2024-12-28 RX ADMIN — CLOPIDOGREL BISULFATE 75 MG: 75 TABLET ORAL at 08:10

## 2024-12-28 RX ADMIN — ACETAMINOPHEN 975 MG: 325 TABLET, FILM COATED ORAL at 05:30

## 2024-12-28 RX ADMIN — ALBUMIN (HUMAN) 25 G: 12.5 INJECTION, SOLUTION INTRAVENOUS at 04:16

## 2024-12-28 RX ADMIN — Medication 12.5 MG: at 21:12

## 2024-12-28 RX ADMIN — ACETAMINOPHEN 975 MG: 325 TABLET, FILM COATED ORAL at 21:12

## 2024-12-28 RX ADMIN — METOCLOPRAMIDE 10 MG: 5 INJECTION, SOLUTION INTRAMUSCULAR; INTRAVENOUS at 11:23

## 2024-12-28 RX ADMIN — ONDANSETRON 4 MG: 2 INJECTION INTRAMUSCULAR; INTRAVENOUS at 08:11

## 2024-12-28 RX ADMIN — PANTOPRAZOLE SODIUM 40 MG: 40 TABLET, DELAYED RELEASE ORAL at 05:30

## 2024-12-28 RX ADMIN — NICOTINE 1 PATCH: 14 PATCH, EXTENDED RELEASE TRANSDERMAL at 08:12

## 2024-12-28 RX ADMIN — BISACODYL 10 MG: 10 SUPPOSITORY RECTAL at 11:15

## 2024-12-28 RX ADMIN — Medication 6.25 MG: at 08:11

## 2024-12-28 RX ADMIN — HEPARIN SODIUM 5000 UNITS: 5000 INJECTION, SOLUTION INTRAVENOUS; SUBCUTANEOUS at 05:30

## 2024-12-28 NOTE — PLAN OF CARE
Problem: PAIN - ADULT  Goal: Verbalizes/displays adequate comfort level or baseline comfort level  Description: Interventions:  - Encourage patient to monitor pain and request assistance  - Assess pain using appropriate pain scale  - Administer analgesics based on type and severity of pain and evaluate response  - Implement non-pharmacological measures as appropriate and evaluate response  - Consider cultural and social influences on pain and pain management  - Notify physician/advanced practitioner if interventions unsuccessful or patient reports new pain  Outcome: Progressing     Problem: INFECTION - ADULT  Goal: Absence or prevention of progression during hospitalization  Description: INTERVENTIONS:  - Assess and monitor for signs and symptoms of infection  - Monitor lab/diagnostic results  - Monitor all insertion sites, i.e. indwelling lines, tubes, and drains  - Monitor endotracheal if appropriate and nasal secretions for changes in amount and color  - [place] appropriate cooling/warming therapies per order  - Administer medications as ordered  - Instruct and encourage patient and family to use good hand hygiene technique  - Identify and instruct in appropriate isolation precautions for identified infection/condition  Outcome: Progressing     Problem: SAFETY ADULT  Goal: Patient will remain free of falls  Description: INTERVENTIONS:  - Educate patient/family on patient safety including physical limitations  - Instruct patient to call for assistance with activity   - Consult OT/PT to assist with strengthening/mobility   - Keep Call bell within reach  - Keep bed low and locked with side rails adjusted as appropriate  - Keep care items and personal belongings within reach  - Initiate and maintain comfort rounds  - Make Fall Risk Sign visible to staff  - Offer Toileting every Hours, in advance of need  - Initiate/Maintain alarm  - Obtain necessary fall risk management equipment:   - Apply yellow socks and  bracelet for high fall risk patients  - Consider moving patient to room near nurses station  Outcome: Progressing     Problem: DISCHARGE PLANNING  Goal: Discharge to home or other facility with appropriate resources  Description: INTERVENTIONS:  - Identify barriers to discharge w/patient and caregiver  - Arrange for needed discharge resources and transportation as appropriate  - Identify discharge learning needs (meds, wound care, etc.)  - Arrange for interpretive services to assist at discharge as needed  - Refer to Case Management Department for coordinating discharge planning if the patient needs post-hospital services based on physician/advanced practitioner order or complex needs related to functional status, cognitive ability, or social support system  Outcome: Progressing     Problem: Knowledge Deficit  Goal: Patient/family/caregiver demonstrates understanding of disease process, treatment plan, medications, and discharge instructions  Description: Complete learning assessment and assess knowledge base.  Interventions:  - Provide teaching at level of understanding  - Provide teaching via preferred learning methods  Outcome: Progressing     Problem: CARDIOVASCULAR - ADULT  Goal: Maintains optimal cardiac output and hemodynamic stability  Description: INTERVENTIONS:  - Monitor I/O, vital signs and rhythm  - Monitor for S/S and trends of decreased cardiac output  - Administer and titrate ordered vasoactive medications to optimize hemodynamic stability  - Assess quality of pulses, skin color and temperature  - Assess for signs of decreased coronary artery perfusion  - Instruct patient to report change in severity of symptoms  Outcome: Progressing  Goal: Absence of cardiac dysrhythmias or at baseline rhythm  Description: INTERVENTIONS:  - Continuous cardiac monitoring, vital signs, obtain 12 lead EKG if ordered  - Administer antiarrhythmic and heart rate control medications as ordered  - Monitor electrolytes and  administer replacement therapy as ordered  Outcome: Progressing     Problem: RESPIRATORY - ADULT  Goal: Achieves optimal ventilation and oxygenation  Description: INTERVENTIONS:  - Assess for changes in respiratory status  - Assess for changes in mentation and behavior  - Position to facilitate oxygenation and minimize respiratory effort  - Oxygen administered by appropriate delivery if ordered  - Initiate smoking cessation education as indicated  - Encourage broncho-pulmonary hygiene including cough, deep breathe, Incentive Spirometry  - Assess the need for suctioning and aspirate as needed  - Assess and instruct to report SOB or any respiratory difficulty  - Respiratory Therapy support as indicated  Outcome: Progressing     Problem: GASTROINTESTINAL - ADULT  Goal: Minimal or absence of nausea and/or vomiting  Description: INTERVENTIONS:  - Administer IV fluids if ordered to ensure adequate hydration  - Maintain NPO status until nausea and vomiting are resolved  - Nasogastric tube if ordered  - Administer ordered antiemetic medications as needed  - Provide nonpharmacologic comfort measures as appropriate  - Advance diet as tolerated, if ordered  - Consider nutrition services referral to assist patient with adequate nutrition and appropriate food choices  Outcome: Progressing  Goal: Maintains or returns to baseline bowel function  Description: INTERVENTIONS:  - Assess bowel function  - Encourage oral fluids to ensure adequate hydration  - Administer IV fluids if ordered to ensure adequate hydration  - Administer ordered medications as needed  - Encourage mobilization and activity  - Consider nutritional services referral to assist patient with adequate nutrition and appropriate food choices  Outcome: Progressing  Goal: Maintains adequate nutritional intake  Description: INTERVENTIONS:  - Monitor percentage of each meal consumed  - Identify factors contributing to decreased intake, treat as appropriate  - Assist with  meals as needed  - Monitor I&O, weight, and lab values if indicated  - Obtain nutrition services referral as needed  Outcome: Progressing     Problem: GENITOURINARY - ADULT  Goal: Maintains or returns to baseline urinary function  Description: INTERVENTIONS:  - Assess urinary function  - Encourage oral fluids to ensure adequate hydration if ordered  - Administer IV fluids as ordered to ensure adequate hydration  - Administer ordered medications as needed  - Offer frequent toileting  - Follow urinary retention protocol if ordered  Outcome: Progressing     Problem: METABOLIC, FLUID AND ELECTROLYTES - ADULT  Goal: Electrolytes maintained within normal limits  Description: INTERVENTIONS:  - Monitor labs and assess patient for signs and symptoms of electrolyte imbalances  - Administer electrolyte replacement as ordered  - Monitor response to electrolyte replacements, including repeat lab results as appropriate  - Instruct patient on fluid and nutrition as appropriate  Outcome: Progressing  Goal: Fluid balance maintained  Description: INTERVENTIONS:  - Monitor labs   - Monitor I/O and WT  - Instruct patient on fluid and nutrition as appropriate  - Assess for signs & symptoms of volume excess or deficit  Outcome: Progressing     Problem: HEMATOLOGIC - ADULT  Goal: Maintains hematologic stability  Description: INTERVENTIONS  - Assess for signs and symptoms of bleeding or hemorrhage  - Monitor labs  - Administer supportive blood products/factors as ordered and appropriate  Outcome: Progressing     Problem: Prexisting or High Potential for Compromised Skin Integrity  Goal: Skin integrity is maintained or improved  Description: INTERVENTIONS:  - Identify patients at risk for skin breakdown  - Assess and monitor skin integrity  - Assess and monitor nutrition and hydration status  - Monitor labs   - Assess for incontinence   - Turn and reposition patient  - Assist with mobility/ambulation  - Relieve pressure over bony  prominences  - Avoid friction and shearing  - Provide appropriate hygiene as needed including keeping skin clean and dry  - Evaluate need for skin moisturizer/barrier cream  - Collaborate with interdisciplinary team   - Patient/family teaching  - Consider wound care consult   Outcome: Progressing

## 2024-12-28 NOTE — ASSESSMENT & PLAN NOTE
Wt Readings from Last 3 Encounters:   12/25/24 79 kg (174 lb 2.6 oz)   12/24/24 80 kg (176 lb 5.9 oz)   12/18/24 79.4 kg (175 lb)     Known CKD. Recent contrast load. Creatinine is up slightly, but he is on supplemental O2.  Agree with holding the lisinopril for now.  Increase metoprolol to 12.5 BID home dose  Continue amlodipine, hydralazine.  Recommend resuming torsemide 10mg daily his home dose.  Monitor BMP

## 2024-12-28 NOTE — PROGRESS NOTES
Progress Note - Surgery-General   Name: Jay Roach Jr. 79 y.o. male I MRN: [id number]  Unit/Bed#: [facility] 520-01 I Date of Admission: 12/24/2024   Date of Service: 12/28/2024 I Hospital Day: 4    Assessment & Plan  Mesenteric ischemia (HCC)  Patient is an 79 year old M who presented with portal venous gas highly suspicious for mesenteric ischemia with calcified aortic stenosis proximal to the SMA and celiac artery. Patient s/p exploratory laparotomy with retrograde open SMA stent on 12/14.  The bowel was noted to be pink and well-perfused intraoperatively.  Patient had episode of hypoxia yesterday requiring utilization of supplemental O2.  Respiratory status improved following IV Lasix 40 mg    Patient continues to require 2 L of oxygen with nasal cannula.  Complains of SOB which he attributes to smoking.  No junky cough this morning.  No lower extremity edema.  Continues to have SHOAIB on CKD.  Likely volume down    Plan  -Regular diet  -Continue Plavix 75 mg daily  -Protonix  -Encourage ambulation/out of bed, 3 times daily  -Encourage incentive spirometer use, 10 times per hour  -PT/OT  -Wean supplemental O2    Subjective   Has nausea without vomiting, no appetite.  Passing gas.  Going for walks.  Notices continued distention this morning    Objective :  Temp:  [98 °F (36.7 °C)-98.6 °F (37 °C)] 98 °F (36.7 °C)  HR:  [65-78] 78  BP: (128-163)/(50-62) 142/54  Resp:  [16] 16  SpO2:  [90 %-94 %] 94 %  O2 Device: Nasal cannula  Nasal Cannula O2 Flow Rate (L/min):  [2 L/min] 2 L/min    I/O         12/26 0701  12/27 0700 12/27 0701  12/28 0700 12/28 0701  12/29 0700    P.O. 1420 480     I.V. (mL/kg) 0 (0)      NG/GT       IV Piggyback  500     Total Intake(mL/kg) 1420 (18) 980 (12.4)     Urine (mL/kg/hr) 1045 (0.6) 325 (0.2)     Emesis/NG output       Total Output 1045 325     Net +375 +655            Unmeasured Urine Occurrence  1 x             Physical Exam  Constitutional:       Appearance: Normal appearance.    HENT:      Head: Normocephalic and atraumatic.      Mouth/Throat:      Mouth: Mucous membranes are moist.   Eyes:      Extraocular Movements: Extraocular movements intact.   Cardiovascular:      Rate and Rhythm: Normal rate and regular rhythm.   Pulmonary:      Effort: Pulmonary effort is normal.   Abdominal:      General: Abdomen is flat. There is distension (Diffuse moderate).      Palpations: Abdomen is soft.      Tenderness: There is abdominal tenderness (Diffuse moderate). There is no guarding or rebound.   Musculoskeletal:         General: Normal range of motion.      Cervical back: Normal range of motion and neck supple.   Skin:     General: Skin is warm and dry.   Neurological:      General: No focal deficit present.      Mental Status: He is alert and oriented to person, place, and time.

## 2024-12-28 NOTE — ASSESSMENT & PLAN NOTE
Patient is an 79 year old M who presented with portal venous gas highly suspicious for mesenteric ischemia with calcified aortic stenosis proximal to the SMA and celiac artery. Patient s/p exploratory laparotomy with retrograde open SMA stent on 12/14.  The bowel was noted to be pink and well-perfused intraoperatively.  Patient had episode of hypoxia yesterday requiring utilization of supplemental O2.  Respiratory status improved following IV Lasix 40 mg    Patient continues to require 2 L of oxygen with nasal cannula.  Complains of SOB which he attributes to smoking.  No junky cough this morning.  No lower extremity edema.  Continues to have SHOAIB on CKD.  Likely volume down    Plan  -Regular diet  -Continue Plavix 75 mg daily  -Protonix  -Encourage ambulation/out of bed, 3 times daily  -Encourage incentive spirometer use, 10 times per hour  -PT/OT  -Wean supplemental O2

## 2024-12-28 NOTE — PROGRESS NOTES
Progress Note - Cardiology   Name: Jay Roach Jr. 79 y.o. male I MRN: [id number]  Unit/Bed#: [facility] 520-01 I Date of Admission: 12/24/2024   Date of Service: 12/28/2024 I Hospital Day: 4     Assessment & Plan  Mesenteric ischemia (HCC)  Per vascular surgery. Plavix resumed.  Coronary artery disease involving native coronary artery of native heart without angina pectoris  No recent angina. He has had PCI and CABG in the past. Antiplatelet agent with Plavix. When on dual antiplatelet therapy, he did have GI bleeding.    Resumed oral meds as previously ordered for outpatient as BP is stable  S/P CABG (coronary artery bypass graft)    S/P TAVR (transcatheter aortic valve replacement)  Normal bioprosthetic valve function on his last echocardiogram  Sinus bradycardia  HR is stable. Increase metoprolol to his home dose 12.5 BID  Chronic diastolic CHF (congestive heart failure) ([facility])  Wt Readings from Last 3 Encounters:   12/25/24 79 kg (174 lb 2.6 oz)   12/24/24 80 kg (176 lb 5.9 oz)   12/18/24 79.4 kg (175 lb)     Known CKD. Recent contrast load. Creatinine is up slightly, but he is on supplemental O2.  Agree with holding the lisinopril for now.  Increase metoprolol to 12.5 BID home dose  Continue amlodipine, hydralazine.  Recommend resuming torsemide 10mg daily his home dose.  Monitor BMP      Subjective     Still requiring O2.  C/o abdominal pain, nausea.    Objective :  Temp:  [98 °F (36.7 °C)-98.6 °F (37 °C)] 98 °F (36.7 °C)  HR:  [65-78] 78  BP: (128-163)/(50-62) 142/54  Resp:  [16] 16  SpO2:  [90 %-94 %] 94 %  O2 Device: Nasal cannula  Nasal Cannula O2 Flow Rate (L/min):  [2 L/min] 2 L/min  Orthostatic Blood Pressures      Flowsheet Row Most Recent Value   Blood Pressure 142/54 filed at 12/28/2024 0730   Patient Position - Orthostatic VS Sitting filed at 12/28/2024 0730          First Weight: Weight - Scale: 77.8 kg (171 lb 8.3 oz) (12/24/24 1032)  Vitals:    12/24/24 1032 12/25/24 0600   Weight: 77.8 kg (171  lb 8.3 oz) 79 kg (174 lb 2.6 oz)     Physical Exam  Constitutional:       General: He is not in acute distress.     Appearance: He is well-developed.   Eyes:      General: No scleral icterus.     Conjunctiva/sclera: Conjunctivae normal.   Neck:      Vascular: No JVD.   Cardiovascular:      Rate and Rhythm: Normal rate and regular rhythm.      Heart sounds: Murmur heard.      No friction rub. No gallop.      Comments: Bio avr  Pulmonary:      Effort: Pulmonary effort is normal.      Breath sounds: Normal breath sounds. No wheezing or rales.   Chest:      Chest wall: No tenderness.   Abdominal:      General: A surgical scar is present.      Tenderness: There is abdominal tenderness.   Musculoskeletal:         General: Normal range of motion.      Cervical back: Normal range of motion and neck supple.   Skin:     General: Skin is warm and dry.      Findings: No erythema or rash.   Neurological:      Mental Status: He is alert and oriented to person, place, and time.   Psychiatric:         Behavior: Behavior normal.           Lab Results: I have reviewed the following results:CBC/BMP:   .     12/28/24  0302   SODIUM 140   K 3.8      CO2 23   BUN 46*   CREATININE 2.52*   GLUC 137      Results from last 7 days   Lab Units 12/26/24  0558 12/25/24  0848 12/25/24  0323 12/24/24  2103 12/24/24  1636   WBC Thousand/uL 10.12  --  12.58*  --  16.68*   HEMOGLOBIN g/dL 8.3* 8.3* 8.4*   < > 9.2*   HEMATOCRIT % 26.1*  --  25.5*  --  28.1*   PLATELETS Thousands/uL 131*  --  130*  --  147*    < > = values in this interval not displayed.     Results from last 7 days   Lab Units 12/28/24  0302 12/27/24  1431 12/26/24  0558 12/24/24  1636 12/24/24  1527   POTASSIUM mmol/L 3.8 3.7 3.9   < >  --    CHLORIDE mmol/L 107 109* 112*   < >  --    CO2 mmol/L 23 24 25   < >  --    CO2, I-STAT mmol/L  --   --   --   --  24   BUN mg/dL 46* 43* 38*   < >  --    CREATININE mg/dL 2.52* 2.40* 1.85*   < >  --    GLUCOSE, ISTAT mg/dl  --   --   --    "--  123   CALCIUM mg/dL 8.2* 8.2* 8.5   < >  --     < > = values in this interval not displayed.     Results from last 7 days   Lab Units 12/24/24  1636 12/24/24  0603   INR  1.29* 1.02   PTT seconds 132* 28     Lab Results   Component Value Date    HGBA1C 6.6 (H) 12/24/2024     No results found for: \"CKTOTAL\", \"CKMB\", \"CKMBINDEX\", \"TROPONINI\"    "

## 2024-12-29 ENCOUNTER — APPOINTMENT (INPATIENT)
Dept: RADIOLOGY | Facility: HOSPITAL | Age: 79
DRG: 335 | End: 2024-12-29
Payer: MEDICARE

## 2024-12-29 PROBLEM — N17.9 AKI (ACUTE KIDNEY INJURY) (HCC): Status: ACTIVE | Noted: 2024-12-29

## 2024-12-29 LAB
ANION GAP SERPL CALCULATED.3IONS-SCNC: 8 MMOL/L (ref 4–13)
BUN SERPL-MCNC: 54 MG/DL (ref 5–25)
CALCIUM SERPL-MCNC: 8.1 MG/DL (ref 8.4–10.2)
CHLORIDE SERPL-SCNC: 106 MMOL/L (ref 96–108)
CO2 SERPL-SCNC: 24 MMOL/L (ref 21–32)
CREAT SERPL-MCNC: 2.78 MG/DL (ref 0.6–1.3)
GFR SERPL CREATININE-BSD FRML MDRD: 20 ML/MIN/1.73SQ M
GLUCOSE SERPL-MCNC: 125 MG/DL (ref 65–140)
GLUCOSE SERPL-MCNC: 127 MG/DL (ref 65–140)
GLUCOSE SERPL-MCNC: 129 MG/DL (ref 65–140)
GLUCOSE SERPL-MCNC: 156 MG/DL (ref 65–140)
GLUCOSE SERPL-MCNC: 170 MG/DL (ref 65–140)
MAGNESIUM SERPL-MCNC: 2 MG/DL (ref 1.9–2.7)
PHOSPHATE SERPL-MCNC: 3.2 MG/DL (ref 2.3–4.1)
POTASSIUM SERPL-SCNC: 3.5 MMOL/L (ref 3.5–5.3)
SODIUM SERPL-SCNC: 138 MMOL/L (ref 135–147)

## 2024-12-29 PROCEDURE — 99024 POSTOP FOLLOW-UP VISIT: CPT | Performed by: SURGERY

## 2024-12-29 PROCEDURE — 82948 REAGENT STRIP/BLOOD GLUCOSE: CPT

## 2024-12-29 PROCEDURE — 74018 RADEX ABDOMEN 1 VIEW: CPT

## 2024-12-29 PROCEDURE — 71045 X-RAY EXAM CHEST 1 VIEW: CPT

## 2024-12-29 PROCEDURE — 99232 SBSQ HOSP IP/OBS MODERATE 35: CPT | Performed by: INTERNAL MEDICINE

## 2024-12-29 PROCEDURE — 84100 ASSAY OF PHOSPHORUS: CPT

## 2024-12-29 PROCEDURE — 83735 ASSAY OF MAGNESIUM: CPT

## 2024-12-29 PROCEDURE — 99223 1ST HOSP IP/OBS HIGH 75: CPT | Performed by: INTERNAL MEDICINE

## 2024-12-29 PROCEDURE — 80048 BASIC METABOLIC PNL TOTAL CA: CPT | Performed by: SURGERY

## 2024-12-29 RX ORDER — SODIUM CHLORIDE, SODIUM GLUCONATE, SODIUM ACETATE, POTASSIUM CHLORIDE, MAGNESIUM CHLORIDE, SODIUM PHOSPHATE, DIBASIC, AND POTASSIUM PHOSPHATE .53; .5; .37; .037; .03; .012; .00082 G/100ML; G/100ML; G/100ML; G/100ML; G/100ML; G/100ML; G/100ML
100 INJECTION, SOLUTION INTRAVENOUS CONTINUOUS
Status: DISCONTINUED | OUTPATIENT
Start: 2024-12-29 | End: 2024-12-30

## 2024-12-29 RX ORDER — SIMETHICONE 80 MG
80 TABLET,CHEWABLE ORAL EVERY 6 HOURS PRN
Status: DISCONTINUED | OUTPATIENT
Start: 2024-12-29 | End: 2025-01-03 | Stop reason: HOSPADM

## 2024-12-29 RX ORDER — INSULIN LISPRO 100 [IU]/ML
1-5 INJECTION, SOLUTION INTRAVENOUS; SUBCUTANEOUS EVERY 6 HOURS
Status: DISCONTINUED | OUTPATIENT
Start: 2024-12-30 | End: 2025-01-01

## 2024-12-29 RX ORDER — INSULIN LISPRO 100 [IU]/ML
1-5 INJECTION, SOLUTION INTRAVENOUS; SUBCUTANEOUS
Status: DISCONTINUED | OUTPATIENT
Start: 2024-12-29 | End: 2024-12-29

## 2024-12-29 RX ORDER — LIDOCAINE HYDROCHLORIDE 20 MG/ML
JELLY TOPICAL ONCE
Status: COMPLETED | OUTPATIENT
Start: 2024-12-29 | End: 2024-12-29

## 2024-12-29 RX ORDER — INSULIN LISPRO 100 [IU]/ML
1-5 INJECTION, SOLUTION INTRAVENOUS; SUBCUTANEOUS
Status: CANCELLED | OUTPATIENT
Start: 2024-12-29

## 2024-12-29 RX ORDER — INSULIN LISPRO 100 [IU]/ML
1-5 INJECTION, SOLUTION INTRAVENOUS; SUBCUTANEOUS EVERY 6 HOURS
Status: DISCONTINUED | OUTPATIENT
Start: 2024-12-30 | End: 2024-12-29

## 2024-12-29 RX ORDER — METOPROLOL TARTRATE 1 MG/ML
2.5 INJECTION, SOLUTION INTRAVENOUS EVERY 6 HOURS
Status: DISCONTINUED | OUTPATIENT
Start: 2024-12-29 | End: 2024-12-31

## 2024-12-29 RX ORDER — POTASSIUM CHLORIDE 1500 MG/1
40 TABLET, EXTENDED RELEASE ORAL ONCE
Status: COMPLETED | OUTPATIENT
Start: 2024-12-29 | End: 2024-12-29

## 2024-12-29 RX ORDER — SODIUM CHLORIDE, SODIUM GLUCONATE, SODIUM ACETATE, POTASSIUM CHLORIDE, MAGNESIUM CHLORIDE, SODIUM PHOSPHATE, DIBASIC, AND POTASSIUM PHOSPHATE .53; .5; .37; .037; .03; .012; .00082 G/100ML; G/100ML; G/100ML; G/100ML; G/100ML; G/100ML; G/100ML
500 INJECTION, SOLUTION INTRAVENOUS ONCE
Status: COMPLETED | OUTPATIENT
Start: 2024-12-29 | End: 2024-12-29

## 2024-12-29 RX ORDER — PANTOPRAZOLE SODIUM 40 MG/10ML
40 INJECTION, POWDER, LYOPHILIZED, FOR SOLUTION INTRAVENOUS
Status: DISCONTINUED | OUTPATIENT
Start: 2024-12-30 | End: 2024-12-31

## 2024-12-29 RX ORDER — POLYETHYLENE GLYCOL 3350 17 G/17G
17 POWDER, FOR SOLUTION ORAL DAILY
Status: DISCONTINUED | OUTPATIENT
Start: 2024-12-29 | End: 2025-01-03 | Stop reason: HOSPADM

## 2024-12-29 RX ORDER — ALBUMIN HUMAN 50 G/1000ML
25 SOLUTION INTRAVENOUS ONCE
Status: COMPLETED | OUTPATIENT
Start: 2024-12-29 | End: 2024-12-29

## 2024-12-29 RX ORDER — CALCIUM CARBONATE 500 MG/1
500 TABLET, CHEWABLE ORAL DAILY PRN
Status: DISCONTINUED | OUTPATIENT
Start: 2024-12-29 | End: 2025-01-03 | Stop reason: HOSPADM

## 2024-12-29 RX ORDER — HYDRALAZINE HYDROCHLORIDE 20 MG/ML
5 INJECTION INTRAMUSCULAR; INTRAVENOUS EVERY 4 HOURS PRN
Status: DISCONTINUED | OUTPATIENT
Start: 2024-12-29 | End: 2025-01-03 | Stop reason: HOSPADM

## 2024-12-29 RX ADMIN — POTASSIUM CHLORIDE 40 MEQ: 1500 TABLET, EXTENDED RELEASE ORAL at 07:59

## 2024-12-29 RX ADMIN — Medication 2.5 MG: at 02:27

## 2024-12-29 RX ADMIN — POLYETHYLENE GLYCOL 3350 17 G: 17 POWDER, FOR SOLUTION ORAL at 08:00

## 2024-12-29 RX ADMIN — SODIUM CHLORIDE, SODIUM GLUCONATE, SODIUM ACETATE, POTASSIUM CHLORIDE, MAGNESIUM CHLORIDE, SODIUM PHOSPHATE, DIBASIC, AND POTASSIUM PHOSPHATE 500 ML: .53; .5; .37; .037; .03; .012; .00082 INJECTION, SOLUTION INTRAVENOUS at 14:18

## 2024-12-29 RX ADMIN — HEPARIN SODIUM 5000 UNITS: 5000 INJECTION, SOLUTION INTRAVENOUS; SUBCUTANEOUS at 14:29

## 2024-12-29 RX ADMIN — HEPARIN SODIUM 5000 UNITS: 5000 INJECTION, SOLUTION INTRAVENOUS; SUBCUTANEOUS at 21:48

## 2024-12-29 RX ADMIN — AMLODIPINE BESYLATE 10 MG: 10 TABLET ORAL at 07:46

## 2024-12-29 RX ADMIN — SIMETHICONE 80 MG: 80 TABLET, CHEWABLE ORAL at 16:27

## 2024-12-29 RX ADMIN — Medication 12.5 MG: at 07:46

## 2024-12-29 RX ADMIN — PANCRELIPASE 24000 UNITS: 120000; 24000; 76000 CAPSULE, DELAYED RELEASE PELLETS ORAL at 11:05

## 2024-12-29 RX ADMIN — INSULIN LISPRO 1 UNITS: 100 INJECTION, SOLUTION INTRAVENOUS; SUBCUTANEOUS at 11:06

## 2024-12-29 RX ADMIN — METOROPROLOL TARTRATE 2.5 MG: 5 INJECTION, SOLUTION INTRAVENOUS at 23:44

## 2024-12-29 RX ADMIN — HYDRALAZINE HYDROCHLORIDE 25 MG: 25 TABLET ORAL at 07:46

## 2024-12-29 RX ADMIN — SODIUM CHLORIDE, SODIUM GLUCONATE, SODIUM ACETATE, POTASSIUM CHLORIDE, MAGNESIUM CHLORIDE, SODIUM PHOSPHATE, DIBASIC, AND POTASSIUM PHOSPHATE 100 ML/HR: .53; .5; .37; .037; .03; .012; .00082 INJECTION, SOLUTION INTRAVENOUS at 22:02

## 2024-12-29 RX ADMIN — LIDOCAINE HYDROCHLORIDE: 20 JELLY TOPICAL at 22:23

## 2024-12-29 RX ADMIN — ALBUMIN (HUMAN) 25 G: 12.5 INJECTION, SOLUTION INTRAVENOUS at 07:40

## 2024-12-29 RX ADMIN — PANTOPRAZOLE SODIUM 40 MG: 40 TABLET, DELAYED RELEASE ORAL at 05:10

## 2024-12-29 RX ADMIN — ACETAMINOPHEN 975 MG: 325 TABLET, FILM COATED ORAL at 05:10

## 2024-12-29 RX ADMIN — ONDANSETRON 4 MG: 2 INJECTION INTRAMUSCULAR; INTRAVENOUS at 16:27

## 2024-12-29 RX ADMIN — PANCRELIPASE 24000 UNITS: 120000; 24000; 76000 CAPSULE, DELAYED RELEASE PELLETS ORAL at 16:22

## 2024-12-29 RX ADMIN — SIMETHICONE 80 MG: 80 TABLET, CHEWABLE ORAL at 07:58

## 2024-12-29 RX ADMIN — HEPARIN SODIUM 5000 UNITS: 5000 INJECTION, SOLUTION INTRAVENOUS; SUBCUTANEOUS at 05:10

## 2024-12-29 RX ADMIN — NICOTINE 1 PATCH: 14 PATCH, EXTENDED RELEASE TRANSDERMAL at 07:48

## 2024-12-29 RX ADMIN — CLOPIDOGREL BISULFATE 75 MG: 75 TABLET ORAL at 07:46

## 2024-12-29 NOTE — ASSESSMENT & PLAN NOTE
Status post ex lap, lysis of adhesions, mesenteric angiogram with SMA stenting on 12/24.  Postoperative management as per vascular surgery and surgery team

## 2024-12-29 NOTE — ASSESSMENT & PLAN NOTE
Wt Readings from Last 3 Encounters:   12/25/24 79 kg (174 lb 2.6 oz)   12/24/24 80 kg (176 lb 5.9 oz)   12/18/24 79.4 kg (175 lb)     Known CKD. Recent contrast load. Creatinine continues to rise. Hold diuresis today.   Agree with holding the lisinopril for now.  Continue metoprolol to 12.5 BID home dose  Continue amlodipine, hydralazine.  Monitor BMP

## 2024-12-29 NOTE — ASSESSMENT & PLAN NOTE
Lab Results   Component Value Date    EGFR 20 12/29/2024    EGFR 22 12/28/2024    EGFR 22 12/28/2024    CREATININE 2.78 (H) 12/29/2024    CREATININE 2.59 (H) 12/28/2024    CREATININE 2.52 (H) 12/28/2024   Baseline creatinine 1.8-2.2 follows with Dr. Muñoz as an outpatient

## 2024-12-29 NOTE — ASSESSMENT & PLAN NOTE
Wt Readings from Last 3 Encounters:   12/25/24 79 kg (174 lb 2.6 oz)   12/24/24 80 kg (176 lb 5.9 oz)   12/18/24 79.4 kg (175 lb)

## 2024-12-29 NOTE — ASSESSMENT & PLAN NOTE
Wt Readings from Last 3 Encounters:   12/29/24 83.3 kg (183 lb 10.3 oz)   12/24/24 80 kg (176 lb 5.9 oz)   12/18/24 79.4 kg (175 lb)       Cardiology on board, previously was receiving IV diuretics.  Diuretic currently on hold in the setting of SHOAIB

## 2024-12-29 NOTE — PLAN OF CARE
Problem: PAIN - ADULT  Goal: Verbalizes/displays adequate comfort level or baseline comfort level  Description: Interventions:  - Encourage patient to monitor pain and request assistance  - Assess pain using appropriate pain scale  - Administer analgesics based on type and severity of pain and evaluate response  - Implement non-pharmacological measures as appropriate and evaluate response  - Consider cultural and social influences on pain and pain management  - Notify physician/advanced practitioner if interventions unsuccessful or patient reports new pain  Outcome: Progressing     Problem: INFECTION - ADULT  Goal: Absence or prevention of progression during hospitalization  Description: INTERVENTIONS:  - Assess and monitor for signs and symptoms of infection  - Monitor lab/diagnostic results  - Monitor all insertion sites, i.e. indwelling lines, tubes, and drains  - Monitor endotracheal if appropriate and nasal secretions for changes in amount and color  - [place] appropriate cooling/warming therapies per order  - Administer medications as ordered  - Instruct and encourage patient and family to use good hand hygiene technique  - Identify and instruct in appropriate isolation precautions for identified infection/condition  Outcome: Progressing     Problem: SAFETY ADULT  Goal: Patient will remain free of falls  Description: INTERVENTIONS:  - Educate patient/family on patient safety including physical limitations  - Instruct patient to call for assistance with activity   - Consult OT/PT to assist with strengthening/mobility   - Keep Call bell within reach  - Keep bed low and locked with side rails adjusted as appropriate  - Keep care items and personal belongings within reach  - Initiate and maintain comfort rounds  - Make Fall Risk Sign visible to staff  - Offer Toileting every  Hours, in advance of need  - Initiate/Maintain alarm  - Obtain necessary fall risk management equipment  - Apply yellow socks and  bracelet for high fall risk patients  - Consider moving patient to room near nurses station  Outcome: Progressing     Problem: DISCHARGE PLANNING  Goal: Discharge to home or other facility with appropriate resources  Description: INTERVENTIONS:  - Identify barriers to discharge w/patient and caregiver  - Arrange for needed discharge resources and transportation as appropriate  - Identify discharge learning needs (meds, wound care, etc.)  - Arrange for interpretive services to assist at discharge as needed  - Refer to Case Management Department for coordinating discharge planning if the patient needs post-hospital services based on physician/advanced practitioner order or complex needs related to functional status, cognitive ability, or social support system  Outcome: Progressing     Problem: Knowledge Deficit  Goal: Patient/family/caregiver demonstrates understanding of disease process, treatment plan, medications, and discharge instructions  Description: Complete learning assessment and assess knowledge base.  Interventions:  - Provide teaching at level of understanding  - Provide teaching via preferred learning methods  Outcome: Progressing     Problem: CARDIOVASCULAR - ADULT  Goal: Maintains optimal cardiac output and hemodynamic stability  Description: INTERVENTIONS:  - Monitor I/O, vital signs and rhythm  - Monitor for S/S and trends of decreased cardiac output  - Administer and titrate ordered vasoactive medications to optimize hemodynamic stability  - Assess quality of pulses, skin color and temperature  - Assess for signs of decreased coronary artery perfusion  - Instruct patient to report change in severity of symptoms  Outcome: Progressing  Goal: Absence of cardiac dysrhythmias or at baseline rhythm  Description: INTERVENTIONS:  - Continuous cardiac monitoring, vital signs, obtain 12 lead EKG if ordered  - Administer antiarrhythmic and heart rate control medications as ordered  - Monitor electrolytes and  administer replacement therapy as ordered  Outcome: Progressing     Problem: RESPIRATORY - ADULT  Goal: Achieves optimal ventilation and oxygenation  Description: INTERVENTIONS:  - Assess for changes in respiratory status  - Assess for changes in mentation and behavior  - Position to facilitate oxygenation and minimize respiratory effort  - Oxygen administered by appropriate delivery if ordered  - Initiate smoking cessation education as indicated  - Encourage broncho-pulmonary hygiene including cough, deep breathe, Incentive Spirometry  - Assess the need for suctioning and aspirate as needed  - Assess and instruct to report SOB or any respiratory difficulty  - Respiratory Therapy support as indicated  Outcome: Progressing     Problem: GASTROINTESTINAL - ADULT  Goal: Minimal or absence of nausea and/or vomiting  Description: INTERVENTIONS:  - Administer IV fluids if ordered to ensure adequate hydration  - Maintain NPO status until nausea and vomiting are resolved  - Nasogastric tube if ordered  - Administer ordered antiemetic medications as needed  - Provide nonpharmacologic comfort measures as appropriate  - Advance diet as tolerated, if ordered  - Consider nutrition services referral to assist patient with adequate nutrition and appropriate food choices  Outcome: Progressing  Goal: Maintains or returns to baseline bowel function  Description: INTERVENTIONS:  - Assess bowel function  - Encourage oral fluids to ensure adequate hydration  - Administer IV fluids if ordered to ensure adequate hydration  - Administer ordered medications as needed  - Encourage mobilization and activity  - Consider nutritional services referral to assist patient with adequate nutrition and appropriate food choices  Outcome: Progressing  Goal: Maintains adequate nutritional intake  Description: INTERVENTIONS:  - Monitor percentage of each meal consumed  - Identify factors contributing to decreased intake, treat as appropriate  - Assist with  meals as needed  - Monitor I&O, weight, and lab values if indicated  - Obtain nutrition services referral as needed  Outcome: Progressing     Problem: GENITOURINARY - ADULT  Goal: Maintains or returns to baseline urinary function  Description: INTERVENTIONS:  - Assess urinary function  - Encourage oral fluids to ensure adequate hydration if ordered  - Administer IV fluids as ordered to ensure adequate hydration  - Administer ordered medications as needed  - Offer frequent toileting  - Follow urinary retention protocol if ordered  Outcome: Progressing     Problem: METABOLIC, FLUID AND ELECTROLYTES - ADULT  Goal: Electrolytes maintained within normal limits  Description: INTERVENTIONS:  - Monitor labs and assess patient for signs and symptoms of electrolyte imbalances  - Administer electrolyte replacement as ordered  - Monitor response to electrolyte replacements, including repeat lab results as appropriate  - Instruct patient on fluid and nutrition as appropriate  Outcome: Progressing  Goal: Fluid balance maintained  Description: INTERVENTIONS:  - Monitor labs   - Monitor I/O and WT  - Instruct patient on fluid and nutrition as appropriate  - Assess for signs & symptoms of volume excess or deficit  Outcome: Progressing     Problem: HEMATOLOGIC - ADULT  Goal: Maintains hematologic stability  Description: INTERVENTIONS  - Assess for signs and symptoms of bleeding or hemorrhage  - Monitor labs  - Administer supportive blood products/factors as ordered and appropriate  Outcome: Progressing     Problem: Prexisting or High Potential for Compromised Skin Integrity  Goal: Skin integrity is maintained or improved  Description: INTERVENTIONS:  - Identify patients at risk for skin breakdown  - Assess and monitor skin integrity  - Assess and monitor nutrition and hydration status  - Monitor labs   - Assess for incontinence   - Turn and reposition patient  - Assist with mobility/ambulation  - Relieve pressure over bony  prominences  - Avoid friction and shearing  - Provide appropriate hygiene as needed including keeping skin clean and dry  - Evaluate need for skin moisturizer/barrier cream  - Collaborate with interdisciplinary team   - Patient/family teaching  - Consider wound care consult   Outcome: Progressing

## 2024-12-29 NOTE — ASSESSMENT & PLAN NOTE
Wt Readings from Last 3 Encounters:   12/30/24 84.1 kg (185 lb 6.5 oz)   12/24/24 80 kg (176 lb 5.9 oz)   12/18/24 79.4 kg (175 lb)   Diuretics per cardiology currently on hold  Recommend 1 L IV fluids today then DC

## 2024-12-29 NOTE — PROGRESS NOTES
Progress Note - Cardiology   Name: Jay Roach Jr. 79 y.o. male I MRN: [id number]  Unit/Bed#: [facility] 520-01 I Date of Admission: 12/24/2024   Date of Service: 12/29/2024 I Hospital Day: 5     Assessment & Plan  Mesenteric ischemia (HCC)  Per vascular surgery. S/p ex lap with retrograde open SMA stent 12/24. Plavix resumed.  Coronary artery disease involving native coronary artery of native heart without angina pectoris  No recent angina. He has had PCI and CABG in the past. Antiplatelet agent with Plavix. When on dual antiplatelet therapy, he did have GI bleeding.    Resumed oral meds as previously ordered for outpatient as BP is stable  S/P TAVR (transcatheter aortic valve replacement)  Normal bioprosthetic valve function on his last echocardiogram  Sinus bradycardia  HR is stable. Overnight had brief   Chronic diastolic CHF (congestive heart failure) ([facility])  Wt Readings from Last 3 Encounters:   12/25/24 79 kg (174 lb 2.6 oz)   12/24/24 80 kg (176 lb 5.9 oz)   12/18/24 79.4 kg (175 lb)     Known CKD. Recent contrast load. Creatinine continues to rise. Hold diuresis today.   Agree with holding the lisinopril for now.  Continue metoprolol to 12.5 BID home dose  Continue amlodipine, hydralazine.  Monitor BMP    CKD (chronic kidney disease) stage 4, GFR 15-29 ml/min ([facility])  Lab Results   Component Value Date    EGFR 20 12/29/2024    EGFR 22 12/28/2024    EGFR 22 12/28/2024    CREATININE 2.78 (H) 12/29/2024    CREATININE 2.59 (H) 12/28/2024    CREATININE 2.52 (H) 12/28/2024     Creatinine increased. Hold diuretic today. Hold lisinopril.        Subjective   Trying to wean O2.  Creatinine increased today.  C/o abdominal pain.    Objective :  Temp:  [97.7 °F (36.5 °C)-98.8 °F (37.1 °C)] 98.4 °F (36.9 °C)  HR:  [68-75] 68  BP: (142-155)/(42-66) 145/42  Resp:  [16-24] 17  SpO2:  [89 %-94 %] 91 %  O2 Device: None (Room air)  Nasal Cannula O2 Flow Rate (L/min):  [3 L/min] 3 L/min  Orthostatic Blood Pressures      Flowsheet  Row Most Recent Value   Blood Pressure 145/42 filed at 12/29/2024 0718   Patient Position - Orthostatic VS Sitting filed at 12/29/2024 0718          First Weight: Weight - Scale: 77.8 kg (171 lb 8.3 oz) (12/24/24 1032)  Vitals:    12/24/24 1032 12/25/24 0600   Weight: 77.8 kg (171 lb 8.3 oz) 79 kg (174 lb 2.6 oz)     Physical Exam  Constitutional:       General: He is not in acute distress.     Appearance: He is well-developed.   Eyes:      General: No scleral icterus.     Conjunctiva/sclera: Conjunctivae normal.   Neck:      Vascular: No JVD.   Cardiovascular:      Rate and Rhythm: Normal rate and regular rhythm.      Heart sounds: Murmur heard.      No friction rub. No gallop.      Comments: Bio avr  Pulmonary:      Effort: Pulmonary effort is normal.      Breath sounds: Normal breath sounds. No wheezing or rales.   Chest:      Chest wall: No tenderness.   Abdominal:      General: A surgical scar is present.      Tenderness: There is abdominal tenderness.   Musculoskeletal:         General: Normal range of motion.      Cervical back: Normal range of motion and neck supple.   Skin:     General: Skin is warm and dry.      Findings: No erythema or rash.   Neurological:      Mental Status: He is alert and oriented to person, place, and time.   Psychiatric:         Behavior: Behavior normal.           Lab Results: I have reviewed the following results:CBC/BMP:   .     12/28/24 1222 12/29/24  0338   WBC 7.42  --    HGB 8.9*  --    HCT 28.1*  --      --    SODIUM 138 138   K 3.7 3.5    106   CO2 25 24   BUN 48* 54*   CREATININE 2.59* 2.78*   GLUC 214* 129      Results from last 7 days   Lab Units 12/28/24  1222 12/26/24  0558 12/25/24  0848 12/25/24  0323   WBC Thousand/uL 7.42 10.12  --  12.58*   HEMOGLOBIN g/dL 8.9* 8.3* 8.3* 8.4*   HEMATOCRIT % 28.1* 26.1*  --  25.5*   PLATELETS Thousands/uL 155 131*  --  130*     Results from last 7 days   Lab Units 12/29/24  0338 12/28/24  1222 12/28/24  2361  "12/24/24  1636 12/24/24  1527   POTASSIUM mmol/L 3.5 3.7 3.8   < >  --    CHLORIDE mmol/L 106 106 107   < >  --    CO2 mmol/L 24 25 23   < >  --    CO2, I-STAT mmol/L  --   --   --   --  24   BUN mg/dL 54* 48* 46*   < >  --    CREATININE mg/dL 2.78* 2.59* 2.52*   < >  --    GLUCOSE, ISTAT mg/dl  --   --   --   --  123   CALCIUM mg/dL 8.1* 8.3* 8.2*   < >  --     < > = values in this interval not displayed.     Results from last 7 days   Lab Units 12/24/24  1636 12/24/24  0603   INR  1.29* 1.02   PTT seconds 132* 28     Lab Results   Component Value Date    HGBA1C 6.6 (H) 12/24/2024     No results found for: \"CKTOTAL\", \"CKMB\", \"CKMBINDEX\", \"TROPONINI\"    "

## 2024-12-29 NOTE — ASSESSMENT & PLAN NOTE
Lab Results   Component Value Date    EGFR 23 12/30/2024    EGFR 20 12/29/2024    EGFR 22 12/28/2024    CREATININE 2.46 (H) 12/30/2024    CREATININE 2.78 (H) 12/29/2024    CREATININE 2.59 (H) 12/28/2024   Review of record shows baseline creatinine 1.8 to 2.2 mg/dL   As an outpatient follows up with Dr. 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

## 2024-12-29 NOTE — ASSESSMENT & PLAN NOTE
Patient is an 79 year old M who presented with portal venous gas highly suspicious for mesenteric ischemia with calcified aortic stenosis proximal to the SMA and celiac artery. Patient s/p exploratory laparotomy with retrograde open SMA stent on 12/14.  The bowel was noted to be pink and well-perfused intraoperatively.  Patient had episode of hypoxia yesterday requiring utilization of supplemental O2.  Respiratory status improved following IV Lasix 40 mg    Patient still having ongoing oxygen requirements, requiring as high as 3 L nasal cannula.  Incentive spirometry 2 L    Plan  -Regular diet  -Continue Plavix 75 mg daily  -Protonix  -Encourage ambulation/out of bed, 3 times daily  -Encourage incentive spirometer use, 10 times per hour  -PT/OT  -Wean supplemental O2  - May require pulmonary evaluation if unable to wean off of oxygen  - Morning chest x-ray  - Plan for potentially administering Lasix depending on chest x-ray  - Albumin for volume status

## 2024-12-29 NOTE — CONSULTS
Consultation - Nephrology   Jay Roach Jr. 79 y.o. male MRN: [id number]  Unit/Bed#: [facility] 520-01 Encounter: [id number]    ASSESSMENT and PLAN:  Assessment & Plan  SHOAIB (acute kidney injury) (HCC)  SHOAIB suspected multifactorial, noted patient had a CTA on 12/24, status post ex lap, lysis of additions, mesenteric angiogram with SMA stenting on 12/24.  Patient received lisinopril on 12/27, previous diuresis.  Agree with cardiology on holding diuretic for now as long as oxygen requirements remain stable.  Avoid relative hypotension.  Avoid any further ACE or ARB's.  Follow daily labs  CKD (chronic kidney disease) stage 4, GFR 15-29 ml/min ([facility])  Lab Results   Component Value Date    EGFR 20 12/29/2024    EGFR 22 12/28/2024    EGFR 22 12/28/2024    CREATININE 2.78 (H) 12/29/2024    CREATININE 2.59 (H) 12/28/2024    CREATININE 2.52 (H) 12/28/2024   Baseline creatinine 1.8-2.2 follows with Dr. Muñoz as an outpatient  Mesenteric ischemia (HCC)  Status post ex lap, lysis of adhesions, mesenteric angiogram with SMA stenting on 12/24.  Postoperative management as per vascular surgery and surgery team  Chronic diastolic CHF (congestive heart failure) ([facility])  Wt Readings from Last 3 Encounters:   12/29/24 83.3 kg (183 lb 10.3 oz)   12/24/24 80 kg (176 lb 5.9 oz)   12/18/24 79.4 kg (175 lb)       Cardiology on board, previously was receiving IV diuretics.  Diuretic currently on hold in the setting of SHAOIB      S/P CABG (coronary artery bypass graft)    Coronary artery disease involving native coronary artery of native heart without angina pectoris    Sinus bradycardia    S/P TAVR (transcatheter aortic valve replacement)        SUMMARY OF RECOMMENDATIONS:  Agree with cardiology on keep holding diuretics for now as long as oxygen requirements remain stable.  Avoid relative hypotension.  Follow daily labs.  Avoid any further ACE or ARB's.  Recommendation was discussed with primary team, they agree with the plan    HISTORY OF  PRESENT ILLNESS:  Requesting Physician: Bettie Andrade, DO  Reason for Consult: SHOAIB on top of CKD    Jay Roach Jr. is a 79 y.o. male who was transferred to [facility] from [place] ED after presenting with 1 week of abdominal pain, CT scan showed fatty stranding and portal venous gas highly suspicious for mesenteric ischemia. A renal consultation is requested today for assistance in the management of SHOAIB on top of CKD.  Patient with history of CKD stage IV baseline creatinine 1.8-2.2, follows with Dr. Muñoz, CAD status post CABG, hypertension, bilateral carotid stenosis, PAD status post bilateral revascularization, AAS status post TAVR was transferred to [facility] with 1 week of worsening abdominal pain, CTA on 12/24 shows pneumatosis and suspicion for mesenteric ischemia, patient was taken to the OR on 12/24 status post ex lap with retrograde open SMA stent, creatinine on admission 2.08, initially creatinine went down to 1.85 with IV fluids, noted lisinopril was started on 12/27, creatinine increased, today up to 2.78 further recent nephrology was consulted.  Previously received IV diuretics, currently on hold.  During my evaluation patient complaining of some abdominal pain but seems to be improving, reports finally have a smelly bowel movement today, denies significant chest pain or shortness of breath      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 ([facility]) 09/10/2021    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 ([facility]) 02/24/2023    Basal cell carcinoma     right cheek    CAD (coronary artery disease)     Carotid stenosis, asymptomatic, bilateral     Chronic kidney disease     Colon polyp     Dependence on respirator (ventilator) status ([facility]) 04/07/2023    Diabetes ([facility])     type 2, non-insulin dependent    Diabetes mellitus  (HCC)     GERD (gastroesophageal reflux disease)     History of nephrolithiasis     Hyperlipidemia     Hypertension     Left foot pain 11/30/2022    Lung cancer (HCC)     PAD (peripheral artery disease) (HCC)     Severe aortic stenosis     Squamous cell skin cancer 11/22/2022    left superior helix       PAST SURGICAL HISTORY:  Past Surgical History:   Procedure Laterality Date    AORTA - SUPERIOR MESENTERIC ARTERY BYPASS GRAFT N/A 12/24/2024    Procedure: OPEN MESENTERIC STENTING;  Surgeon: Eagle Higuera MD;  Location: [facility];  Service: Vascular    APPENDECTOMY      ARTERIOGRAM N/A 12/24/2024    Procedure: ARTERIOGRAM;  Surgeon: Eagle Higuera MD;  [facility];  Service: Vascular    CARDIAC CATHETERIZATION N/A 05/05/2022    Procedure: CARDIAC RHC/LHC;  Surgeon: Arvin Sanchez DO;  [facility];  Service: Cardiology    CARDIAC CATHETERIZATION N/A 05/05/2022    Procedure: Cardiac Coronary Angiogram;  Surgeon: Arvin Sanchez DO;  [facility];  Service: Cardiology    CARDIAC CATHETERIZATION N/A 05/24/2022    Procedure: Cardiac pci;  Surgeon: Damien Jeter MD;  [facility];  Service: Cardiology    CARDIAC CATHETERIZATION N/A 06/14/2022    Procedure: CARDIAC TAVR;  Surgeon: Nahum Vaz MD;  [facility];  Service: Cardiology    COLECTOMY      COLONOSCOPY  2013    CORONARY ARTERY BYPASS GRAFT  2013    X 2    FEMORAL ARTERY - POPLITEAL ARTERY BYPASS GRAFT      HEMORRHOID SURGERY      IR AORTAGRAM WITH RUN-OFF  11/19/2018    IR AORTAGRAM WITH RUN-OFF  03/02/2023    IR BIOPSY LUNG  4/17/2024    IR LOWER EXTREMITY ANGIOGRAM  03/23/2023    IR MESENTERIC/VISCERAL ANGIOGRAM  12/24/2024    MOHS SURGERY Left 01/11/2023    SCC left superior helix-Dr Tovar    NV LAPS ABD PRTM&OMENTUM DX W/WO SPEC BR/WA SPX N/A 12/24/2024    Procedure: EXPLORATORY LAPAROTOMY, LYSIS OF ADHESIONS, ABDOMEN CLOSURE;  Surgeon: Alvin Flores MD;   Location: [facility];  Service: General    AK SLCTV CATHJ 3RD+ ORD SLCTV ABDL PEL/LXTR BRNCH Left 08/12/2016    Procedure: LEFT FEMORAL ARTERIOGRAM; BALLOON ANGIOPLASTY; SFA  AND FEMORAL AT VEIN GRAFT;  Surgeon: Nicholas Urena MD;  [facility];  Service: Vascular    AK TEAEC W/WO PATCH GRAFT COMMON FEMORAL Right 03/23/2023    Procedure: Common femoral endarterectomy&antegrade intervention of SFA, popliteal artery w/ shockwave;  Surgeon: Eagle Higuera MD;  Location: [facility];  Service: Vascular    AK TRANSCATHETER TRANSAPICAL REPLACEMT AORTIC VALVE N/A 06/14/2022    Procedure: REPLACEMENT AORTIC VALVE TRANSCATHETER (TAVR) TRANSAPICAL 29MM IRVIN KYLER S3 ULTRA VALVE(ACCESS ON LEFT) ELANA;  Surgeon: Amarjit Gordon DO;  [facility]: [facility];  Service: Cardiac Surgery    SKIN CANCER EXCISION      TONSILLECTOMY AND ADENOIDECTOMY      UPPER GASTROINTESTINAL ENDOSCOPY         SOCIAL HISTORY:  Social History     Substance and Sexual Activity   Alcohol Use Not Currently    Comment: rare     Social History     Substance and Sexual Activity   Drug Use No     Social History     Tobacco Use   Smoking Status Every Day    Current packs/day: 1.00    Average packs/day: 0.6 packs/day for 100.0 years (62.5 ttl pk-yrs)    Types: Cigarettes    Passive exposure: Current   Smokeless Tobacco Never   Tobacco Comments    4 cigarettes per day       FAMILY HISTORY:  Family History   Problem Relation Age of Onset    Heart attack Father     Other Sister         bypass and vlave replacement    Stroke Paternal Uncle     Arrhythmia Neg Hx     Asthma Neg Hx     Clotting disorder Neg Hx     Fainting Neg Hx     Anuerysm Neg Hx     Hypertension Neg Hx         unsure     Hyperlipidemia Neg Hx     Heart failure Neg Hx        ALLERGIES:  No Known Allergies    MEDICATIONS:    Current Facility-Administered Medications:     acetaminophen (TYLENOL) tablet 975 mg, 975 mg, Oral, Q8H [facility], Rlodan Wilkes MD, 975 mg at 12/29/24 0510     amLODIPine (NORVASC) tablet 10 mg, 10 mg, Oral, Daily, Simón Cee MD, 10 mg at 12/29/24 0746    atorvastatin (LIPITOR) tablet 80 mg, 80 mg, Oral, HS, Simón Cee MD, 80 mg at 12/28/24 2112    calcium carbonate (TUMS) chewable tablet 500 mg, 500 mg, Oral, Daily PRN, Evelyn Appiah MD    clopidogrel (PLAVIX) tablet 75 mg, 75 mg, Oral, Daily, Renee Wooten PA-C, 75 mg at 12/29/24 0746    heparin (porcine) subcutaneous injection 5,000 Units, 5,000 Units, Subcutaneous, Q8H TRENT, Simón Cee MD, 5,000 Units at 12/29/24 0510    hydrALAZINE (APRESOLINE) tablet 25 mg, 25 mg, Oral, BID, Simón Cee MD, 25 mg at 12/29/24 0746    HYDROmorphone (DILAUDID) injection 0.5 mg, 0.5 mg, Intravenous, Q3H PRN, Simón Cee MD    HYDROmorphone HCl (DILAUDID) injection 0.2 mg, 0.2 mg, Intravenous, Q2H PRN, Roldan Wilkes MD    insulin lispro (HumALOG/ADMELOG) 100 units/mL subcutaneous injection 1-5 Units, 1-5 Units, Subcutaneous, 4x Daily (AC & HS), 1 Units at 12/28/24 2111 **AND** Fingerstick Glucose (POCT), , , 4 times day, Jay Suarez MD    labetalol (NORMODYNE) injection 10 mg, 10 mg, Intravenous, Q3H PRN, Jb Samuel MD    metoclopramide (REGLAN) injection 10 mg, 10 mg, Intravenous, Q6H PRN, Evelyn Appiah MD, 10 mg at 12/28/24 1123    metoprolol tartrate (LOPRESSOR) partial tablet 12.5 mg, 12.5 mg, Oral, Q12H TRENT, Evelyn Appiah MD, 12.5 mg at 12/29/24 0746    nicotine (NICODERM CQ) 14 mg/24hr TD 24 hr patch 1 patch, 1 patch, Transdermal, Daily, Jb Samuel MD, 1 patch at 12/29/24 0748    ondansetron (ZOFRAN) injection 4 mg, 4 mg, Intravenous, Q4H PRN, Simón Cee MD, 4 mg at 12/28/24 0811    oxyCODONE (ROXICODONE) split tablet 2.5 mg, 2.5 mg, Oral, Q4H PRN, 2.5 mg at 12/29/24 0227 **OR** oxyCODONE (ROXICODONE) IR tablet 5 mg, 5 mg, Oral, Q4H PRN, Roldan Wilkes MD    pancrelipase (Lip-Prot-Amyl) (CREON) delayed release capsule 24,000 Units, 24,000 Units, Oral, TID With Meals, Evelyn  MD Td    pantoprazole (PROTONIX) EC tablet 40 mg, 40 mg, Oral, Early Morning, Simón Cee MD, 40 mg at 12/29/24 0510    polyethylene glycol (MIRALAX) packet 17 g, 17 g, Oral, Daily, Evelyn Appiah MD, 17 g at 12/29/24 0800    senna-docusate sodium (SENOKOT S) 8.6-50 mg per tablet 1 tablet, 1 tablet, Oral, HS, Roldan Wilkes MD, 1 tablet at 12/28/24 2112    simethicone (MYLICON) chewable tablet 80 mg, 80 mg, Oral, Q6H PRN, Roldan Wilkes MD, 80 mg at 12/29/24 0758    REVIEW OF SYSTEMS:  All the systems were reviewed and were negative except as documented on the HPI.    PHYSICAL EXAM:  Current Weight: Weight - Scale: 83.3 kg (183 lb 10.3 oz)  First Weight: Weight - Scale: 77.8 kg (171 lb 8.3 oz)  Vitals:    12/29/24 0220 12/29/24 0718 12/29/24 0740 12/29/24 0958   BP: (!) 155/49 (!) 145/42     BP Location:  Left arm     Pulse: 71 68     Resp: 20 17     Temp: 98.1 °F (36.7 °C) 98.4 °F (36.9 °C)     TempSrc:  Axillary     SpO2: 91% 91% 95%    Weight:    83.3 kg (183 lb 10.3 oz)   Height:           Intake/Output Summary (Last 24 hours) at 12/29/2024 1059  Last data filed at 12/29/2024 0834  Gross per 24 hour   Intake 1760 ml   Output 575 ml   Net 1185 ml       General: conscious, cooperative, in not acute distress  Eyes: conjunctivae pink, anicteric sclerae  ENT: lips and mucous membranes mildly dry, oxygen via nasal cannula 2 L  Neck: supple, no JVD  Chest: clear breath sounds bilateral, no crackles, ronchus or wheezings  CVS: distinct S1 & S2, normal rate, regular rhythm  Abdomen: soft, mildly-tender, mildly-distended, normoactive bowel sounds  Extremities: no significant edema of both legs  Skin: no rash  Neuro: awake, alert, oriented      Invasive Devices:        Lab Results:   Results from last 7 days   Lab Units 12/29/24  0338 12/28/24  1222 12/28/24  0302 12/27/24  1431 12/26/24  0558 12/25/24  0848 12/25/24  0342 12/25/24  0323 12/24/24  2103 12/24/24  1636 12/24/24  1527 12/24/24  1408 12/24/24  1408  12/24/24  0128   WBC Thousand/uL  --  7.42  --   --  10.12  --   --  12.58*  --  16.68*  --   --   --  11.11*   HEMOGLOBIN g/dL  --  8.9*  --   --  8.3* 8.3*  --  8.4*   < > 9.2*  --   --   --  12.2   I STAT HEMOGLOBIN g/dl  --   --   --   --   --   --   --   --   --   --  8.5*   < > 9.2*  --    HEMATOCRIT %  --  28.1*  --   --  26.1*  --   --  25.5*  --  28.1*  --   --   --  36.6   HEMATOCRIT, ISTAT %  --   --   --   --   --   --   --   --   --   --  25*   < > 27*  --    PLATELETS Thousands/uL  --  155  --   --  131*  --   --  130*  --  147*  --   --   --  186   SODIUM mmol/L 138 138 140   < > 145  --  142  --   --  142  --   --   --  140   POTASSIUM mmol/L 3.5 3.7 3.8   < > 3.9  --  4.3  --   --  3.7  --   --   --  3.6   CHLORIDE mmol/L 106 106 107   < > 112*  --  112*  --   --  113*  --   --   --  105   CO2 mmol/L 24 25 23   < > 25  --  24  --   --  21  --   --   --  23   CO2, I-STAT mmol/L  --   --   --   --   --   --   --   --   --   --  24   < > 24  --    BUN mg/dL 54* 48* 46*   < > 38*  --  35*  --   --  36*  --   --   --  41*   CREATININE mg/dL 2.78* 2.59* 2.52*   < > 1.85*  --  1.84*  --   --  1.75*  --   --   --  2.08*   CALCIUM mg/dL 8.1* 8.3* 8.2*   < > 8.5  --  8.4  --   --  7.7*  --   --   --  9.9   MAGNESIUM mg/dL  --   --   --   --  2.3  --  2.1  --   --  1.4*  --   --   --   --    PHOSPHORUS mg/dL  --   --   --   --  3.1  --  3.6  --   --  3.3  --   --   --   --    ALK PHOS U/L  --   --   --   --   --   --   --   --   --  56  --   --   --  80   ALT U/L  --   --   --   --   --   --   --   --   --  5*  --   --   --  7   AST U/L  --   --   --   --   --   --   --   --   --  9*  --   --   --  13   GLUCOSE, ISTAT mg/dl  --   --   --   --   --   --   --   --   --   --  123  --  111  --     < > = values in this interval not displayed.       Other Studies:   CT scan with IV contrast on 12/24 showed moderate to severe atherosclerosis, no evidence of bowel obstruction      Portions of the record may have been  "created with voice recognition software. Occasional wrong word or \"sound a like\" substitutions may have occurred due to the inherent limitations of voice recognition software. Read the chart carefully and recognize, using context, where substitutions have occurred.If you have any questions, please contact the dictating provider.  "

## 2024-12-29 NOTE — ASSESSMENT & PLAN NOTE
SHOAIB suspected multifactorial, noted patient had a CTA on 12/24, status post ex lap, lysis of additions, mesenteric angiogram with SMA stenting on 12/24.  Patient received lisinopril on 12/27, previous diuresis.  Agree with cardiology on holding diuretic for now as long as oxygen requirements remain stable.  Avoid relative hypotension.  Avoid any further ACE or ARB's.  Follow daily labs

## 2024-12-29 NOTE — ASSESSMENT & PLAN NOTE
Lab Results   Component Value Date    EGFR 20 12/29/2024    EGFR 22 12/28/2024    EGFR 22 12/28/2024    CREATININE 2.78 (H) 12/29/2024    CREATININE 2.59 (H) 12/28/2024    CREATININE 2.52 (H) 12/28/2024     Creatinine increased. Hold diuretic today. Hold lisinopril.

## 2024-12-29 NOTE — PROGRESS NOTES
Progress Note - Nephrology   Name: Jay Roach Jr. 79 y.o. male I MRN: [id number]  Unit/Bed#: [facility] 530-01 I Date of Admission: 12/24/2024   Date of Service: 12/30/2024 I Hospital Day: 6    79-year-old male with multiple comorbidities including CKD stage IV, peripheral arterial disease status post bilateral revascularization, TAVR, CABG, hypertension and bilateral carotid stenosis admitted with abdominal pain CTA suspicious for pneumatosis and mesenteric ischemia taken to the OR for expiratory lap with retrograde open SMA stent.  Nephrology consulted for evaluation and management of acute kidney injury.  Assessment & Plan  SHOAIB (acute kidney injury) ([facility])  SHOAIB most likely secondary to FELIPE 12/24+ ischemic injury secondary to hemodynamic perturbation intraoperatively in light of underlying comorbidities plus failure to autoregulate) lisinopril with subsequent ATN  After review of records it appears that the patient has a baseline Creatinine of 1.8-2.2 mg/dL.  Admission creatinine of 2.08 mg/dL on 12/24/2024.  Creatinine today is at 2.46 mg/dL stable and improving.  Clinically appears to be hypovolemic recommend Plasma-Lyte 100 cc an hour for 1 L then DC  check BMP, magnesium, phosphorus in a.m.  Await renal recovery.  Place on a renal diet when allowed diet order.   Strict I/O.  Daily weights  Urinary retention protocol  Avoid nephrotoxins, adjust meds to appropriate GFR.  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 Girard  CKD (chronic kidney disease) stage 4, GFR 15-29 ml/min ([facility])  Lab Results   Component Value Date    EGFR 23 12/30/2024    EGFR 20 12/29/2024    EGFR 22 12/28/2024    CREATININE 2.46 (H) 12/30/2024    CREATININE 2.78 (H) 12/29/2024    CREATININE 2.59 (H) 12/28/2024   Review of record shows baseline creatinine 1.8 to 2.2 mg/dL   As an outpatient follows up with Dr. Girard for nephrology    Likely has underlying CKD secondary to hypertensive nephrosclerosis plus obesity related hyperfiltration.    Mesenteric ischemia (HCC)  Status post expiratory laparotomy, lysis of adhesions mesenteric angiogram with SMA stenting on 12/24   Follow-up with vascular surgery and general surgery   Chronic diastolic CHF (congestive heart failure) (HCC)  Wt Readings from Last 3 Encounters:   12/30/24 84.1 kg (185 lb 6.5 oz)   12/24/24 80 kg (176 lb 5.9 oz)   12/18/24 79.4 kg (175 lb)   Diuretics per cardiology currently on hold  Recommend 1 L IV fluids today then DC      I have reviewed the nephrology recommendations including hold diuretics give 1 L IV fluids, with medicine team, and we are in agreement with renal plan including the information outlined above.     Subjective   Brief History of Admission -     Patient seen and examined in his room no overnight events hemodynamically stable afebrile uncomfortable with NGT, urine output 650 cc got 500 cc IV fluids yesterday    Objective :  Temp:  [98 °F (36.7 °C)-99.1 °F (37.3 °C)] 98.2 °F (36.8 °C)  HR:  [61-73] 68  BP: (151-163)/(44-50) 157/50  Resp:  [17-20] 18  SpO2:  [85 %-94 %] 93 %  O2 Device: Nasal cannula  Nasal Cannula O2 Flow Rate (L/min):  [2 L/min-44 L/min] 44 L/min    Current Weight: Weight - Scale: 84.1 kg (185 lb 6.5 oz)  First Weight: Weight - Scale: 77.8 kg (171 lb 8.3 oz)  I/O         12/27 0701  12/28 0700 12/28 0701  12/29 0700 12/29 0701  12/30 0700    P.O. 480 1380 600    I.V. (mL/kg)   500 (6)    IV Piggyback 500  500    Total Intake(mL/kg) 980 (12.4) 1380 (17.5) 1600 (19.2)    Urine (mL/kg/hr) 325 (0.2) 695 (0.4) 150 (0.2)    Stool   0    Total Output 325 695 150    Net +655 +685 +1450           Unmeasured Urine Occurrence 1 x 1 x 1 x    Unmeasured Stool Occurrence   1 x          Physical Exam  Vitals and nursing note reviewed.   Constitutional:       General: He is not in acute distress.     Appearance: Normal appearance. He is normal weight. He  is ill-appearing. He is not toxic-appearing.   HENT:      Head: Normocephalic and atraumatic.      Mouth/Throat:      Comments: NGT in place  Eyes:      General: No scleral icterus.  Cardiovascular:      Rate and Rhythm: Normal rate.   Pulmonary:      Effort: No respiratory distress.      Breath sounds: No wheezing.   Abdominal:      General: There is no distension.      Tenderness: There is no abdominal tenderness.   Musculoskeletal:         General: No swelling.      Cervical back: No rigidity.   Skin:     General: Skin is dry.      Coloration: Skin is not jaundiced.   Neurological:      General: No focal deficit present.      Mental Status: He is alert.   Psychiatric:         Mood and Affect: Mood normal.       ROS:  Constitutional:  No chills, no fever.   HENT: NGT in place  Respiratory:  No cough, no hemoptysis, no wheezing.    Cardiovascular:  no leg swelling.   Gastrointestinal:  No constipation, no diarrhea.   Genitourinary: Archuleta catheter in place  Musculoskeletal:  No back pain.   Neurological:  no dizziness, No headaches.   Psychiatric/Behavioral:  No agitation,  All other systems reviewed and are negative.        Medications:    Current Facility-Administered Medications:     acetaminophen (TYLENOL) tablet 975 mg, 975 mg, Oral, [facility], 975 mg at 12/29/24 0510    [Held by provider] amLODIPine (NORVASC) tablet 10 mg, 10 mg, Oral, Daily, Simón Cee MD, 10 mg at 12/29/24 0746    [Held by provider] atorvastatin (LIPITOR) tablet 80 mg, 80 mg, Oral, HS, Simón Cee MD, 80 mg at 12/28/24 2112    [Held by provider] calcium carbonate (TUMS) chewable tablet 500 mg, 500 mg, Oral, Daily PRN, Evelyn Appiah MD    [Held by provider] clopidogrel (PLAVIX) tablet 75 mg, 75 mg, Oral, Daily, Renee Wooten PA-C, 75 mg at 12/29/24 0746    heparin (porcine) subcutaneous injection 5,000 Units, 5,000 Units, Subcutaneous, Q8H [facility], Simón Cee MD, 5,000 Units at 12/30/24 0505    hydrALAZINE  (APRESOLINE) injection 5 mg, 5 mg, Intravenous, Q4H PRN, Roldan Wilkes MD    [Held by provider] hydrALAZINE (APRESOLINE) tablet 25 mg, 25 mg, Oral, BID, Simón Cee MD, 25 mg at 12/29/24 0746    HYDROmorphone (DILAUDID) injection 0.5 mg, 0.5 mg, Intravenous, Q3H PRN, Simón Cee MD    HYDROmorphone HCl (DILAUDID) injection 0.2 mg, 0.2 mg, Intravenous, Q2H PRN, Roldan Wilkes MD, 0.2 mg at 12/30/24 0148    insulin lispro (HumALOG/ADMELOG) 100 units/mL subcutaneous injection 1-5 Units, 1-5 Units, Subcutaneous, Q6H **AND** [PENDING] insulin lispro (HumALOG/ADMELOG) 100 units/mL subcutaneous injection 1-5 Units, 1-5 Units, Subcutaneous, 4x Daily (AC & HS) **AND** Fingerstick Glucose (POCT), , , Q6H **AND** [PENDING] Fingerstick Glucose (POCT), , , BID, Roldan Wilkes MD    labetalol (NORMODYNE) injection 10 mg, 10 mg, Intravenous, Q3H PRN, Jb Samuel MD, 10 mg at 12/30/24 0301    metoclopramide (REGLAN) injection 10 mg, 10 mg, Intravenous, Q6H PRN, Evelyn Appiah MD, 10 mg at 12/28/24 1123    metoprolol (LOPRESSOR) injection 2.5 mg, 2.5 mg, Intravenous, Q6H, Roldan Wilkes MD, 2.5 mg at 12/30/24 1037    [Held by provider] metoprolol tartrate (LOPRESSOR) partial tablet 12.5 mg, 12.5 mg, Oral, Q12H TRENT, Evelyn Appiah MD, 12.5 mg at 12/29/24 0746    multi-electrolyte (PLASMALYTE-A/ISOLYTE-S PH 7.4) IV solution, 100 mL/hr, Intravenous, Continuous, Roldan Wilkes MD, Last Rate: 100 mL/hr at 12/29/24 2202, 100 mL/hr at 12/29/24 2202    nicotine (NICODERM CQ) 14 mg/24hr TD 24 hr patch 1 patch, 1 patch, Transdermal, Daily, Jb Samuel MD, 1 patch at 12/30/24 1035    ondansetron (ZOFRAN) injection 4 mg, 4 mg, Intravenous, Q4H PRN, Simón Cee MD, 4 mg at 12/29/24 1627    oxyCODONE (ROXICODONE) split tablet 2.5 mg, 2.5 mg, Oral, Q4H PRN, 2.5 mg at 12/29/24 0227 **OR** oxyCODONE (ROXICODONE) IR tablet 5 mg, 5 mg, Oral, Q4H PRN, Roldan Wilkes MD    pancrelipase (Lip-Prot-Amyl) (CREON) delayed release  capsule 24,000 Units, 24,000 Units, Oral, TID With Meals, Evelyn Appiah MD, 24,000 Units at 12/29/24 1622    [Held by provider] pantoprazole (PROTONIX) EC tablet 40 mg, 40 mg, Oral, Early Morning, Simón Cee MD, 40 mg at 12/29/24 0510    pantoprazole (PROTONIX) injection 40 mg, 40 mg, Intravenous, Q24H TRENT, Roldan Wilkes MD, 40 mg at 12/30/24 1035    [Held by provider] polyethylene glycol (MIRALAX) packet 17 g, 17 g, Oral, Daily, Evelyn Appiah MD, 17 g at 12/29/24 0800    [Held by provider] senna-docusate sodium (SENOKOT S) 8.6-50 mg per tablet 1 tablet, 1 tablet, Oral, HS, Roldan Wilkes MD, 1 tablet at 12/28/24 2112    [Held by provider] simethicone (MYLICON) chewable tablet 80 mg, 80 mg, Oral, Q6H PRN, Roldan Wilkes MD, 80 mg at 12/29/24 1627      Lab Results: I have reviewed the following results:  Results from last 7 days   Lab Units 12/30/24  0328 12/29/24  0338 12/28/24  1222 12/28/24  0302 12/27/24  1431 12/26/24  0558 12/25/24  0848 12/25/24  0342 12/25/24  0323 12/24/24  2103 12/24/24  1636 12/24/24  1527 12/24/24  1408 12/24/24  1408 12/24/24  0128   WBC Thousand/uL 3.99*  --  7.42  --   --  10.12  --   --  12.58*  --  16.68*  --   --   --  11.11*   HEMOGLOBIN g/dL 7.5*  --  8.9*  --   --  8.3* 8.3*  --  8.4* 9.1* 9.2*  --   --   --  12.2   I STAT HEMOGLOBIN g/dl  --   --   --   --   --   --   --   --   --   --   --  8.5*   < > 9.2*  --    HEMATOCRIT % 23.4*  --  28.1*  --   --  26.1*  --   --  25.5*  --  28.1*  --   --   --  36.6   HEMATOCRIT, ISTAT %  --   --   --   --   --   --   --   --   --   --   --  25*  --  27*  --    PLATELETS Thousands/uL 135*  --  155  --   --  131*  --   --  130*  --  147*  --   --   --  186   POTASSIUM mmol/L 3.7 3.5 3.7 3.8 3.7 3.9  --  4.3  --   --  3.7  --   --   --  3.6   CHLORIDE mmol/L 109* 106 106 107 109* 112*  --  112*  --   --  113*  --   --   --  105   CO2 mmol/L 24 24 25 23 24 25  --  24  --   --  21  --   --   --  23   CO2, I-STAT mmol/L  --   --   --    "--   --   --   --   --   --   --   --  24   < > 24  --    BUN mg/dL 55* 54* 48* 46* 43* 38*  --  35*  --   --  36*  --   --   --  41*   CREATININE mg/dL 2.46* 2.78* 2.59* 2.52* 2.40* 1.85*  --  1.84*  --   --  1.75*  --   --   --  2.08*   CALCIUM mg/dL 8.3* 8.1* 8.3* 8.2* 8.2* 8.5  --  8.4  --   --  7.7*  --   --   --  9.9   MAGNESIUM mg/dL 2.0 2.0  --   --   --  2.3  --  2.1  --   --  1.4*  --   --   --   --    PHOSPHORUS mg/dL 2.3 3.2  --   --   --  3.1  --  3.6  --   --  3.3  --   --   --   --    ALBUMIN g/dL  --   --   --   --   --   --   --   --   --   --  2.8*  --   --   --  4.3   GLUCOSE, ISTAT mg/dl  --   --   --   --   --   --   --   --   --   --   --  123  --  111  --     < > = values in this interval not displayed.       Administrative Statements     Portions of the record may have been created with voice recognition software. Occasional wrong word or \"sound a like\" substitutions may have occurred due to the inherent limitations of voice recognition software. Read the chart carefully and recognize, using context, where substitutions have occurred.If you have any questions, please contact the dictating provider.  "

## 2024-12-29 NOTE — PROGRESS NOTES
Progress Note - Surgery-General   Name: Jay Roach Jr. 79 y.o. male I MRN: [id number]  Unit/Bed#: [facility] 520-01 I Date of Admission: 12/24/2024   Date of Service: 12/29/2024 I Hospital Day: 5    Assessment & Plan  Mesenteric ischemia (HCC)  Patient is an 79 year old M who presented with portal venous gas highly suspicious for mesenteric ischemia with calcified aortic stenosis proximal to the SMA and celiac artery. Patient s/p exploratory laparotomy with retrograde open SMA stent on 12/14.  The bowel was noted to be pink and well-perfused intraoperatively.  Patient had episode of hypoxia yesterday requiring utilization of supplemental O2.  Respiratory status improved following IV Lasix 40 mg    Patient still having ongoing oxygen requirements, requiring as high as 3 L nasal cannula.  Incentive spirometry 2 L    Plan  -Regular diet  -Continue Plavix 75 mg daily  -Protonix  -Encourage ambulation/out of bed, 3 times daily  -Encourage incentive spirometer use, 10 times per hour  -PT/OT  -Wean supplemental O2  - May require pulmonary evaluation if unable to wean off of oxygen  - Morning chest x-ray  - Plan for potentially administering Lasix depending on chest x-ray  - Albumin for volume status  Chronic diastolic CHF (congestive heart failure) (HCC)  Wt Readings from Last 3 Encounters:   12/25/24 79 kg (174 lb 2.6 oz)   12/24/24 80 kg (176 lb 5.9 oz)   12/18/24 79.4 kg (175 lb)               Please contact the SecureChat role for the Surgery-General service with any questions/concerns.    Subjective   Patient does not feel 100% himself as of yet.  Not fully completing his meals.    Objective :  Temp:  [97.7 °F (36.5 °C)-98.8 °F (37.1 °C)] 98.4 °F (36.9 °C)  HR:  [68-75] 68  BP: (142-155)/(42-66) 145/42  Resp:  [16-24] 17  SpO2:  [89 %-94 %] 91 %  O2 Device: None (Room air)  Nasal Cannula O2 Flow Rate (L/min):  [3 L/min-4 L/min] 3 L/min    I/O         12/27 0701 12/28 0700 12/28 0701 12/29 0700 12/29 0701 12/30  0700    P.O. 480 1380 360    I.V. (mL/kg)       IV Piggyback 500      Total Intake(mL/kg) 980 (12.4) 1380 (17.5) 360 (4.6)    Urine (mL/kg/hr) 325 (0.2) 695 (0.4)     Total Output 325 695     Net +655 +685 +360           Unmeasured Urine Occurrence 1 x 1 x             Physical Exam  Vitals reviewed.   HENT:      Mouth/Throat:      Mouth: Mucous membranes are dry.   Eyes:      Pupils: Pupils are equal, round, and reactive to light.   Pulmonary:      Comments: 2 L nasal cannula  Abdominal:      General: Abdomen is flat.      Comments: Minimally distended.  Soft.  Mildly tender.  Surgical site clean, dry, intact with staples in place and periwound ecchymosis   Musculoskeletal:         General: Normal range of motion.   Neurological:      General: No focal deficit present.      Mental Status: He is alert. He is disoriented.           Lab Results: I have reviewed the following results:  Recent Labs     12/28/24  1222 12/29/24  0338   WBC 7.42  --    HGB 8.9*  --    HCT 28.1*  --      --    SODIUM 138 138   K 3.7 3.5    106   CO2 25 24   BUN 48* 54*   CREATININE 2.59* 2.78*   GLUC 214* 129       Imaging Results Review: No pertinent imaging studies reviewed.  Other Study Results Review: No additional pertinent studies reviewed.    VTE Pharmacologic Prophylaxis: VTE covered by:  heparin (porcine), Subcutaneous, 5,000 Units at 12/29/24 0510     VTE Mechanical Prophylaxis: sequential compression device

## 2024-12-29 NOTE — ASSESSMENT & PLAN NOTE
Status post expiratory laparotomy, lysis of adhesions mesenteric angiogram with SMA stenting on 12/24   Follow-up with vascular surgery and general surgery

## 2024-12-29 NOTE — ASSESSMENT & PLAN NOTE
SHOAIB most likely secondary to FELIPE 12/24+ ischemic injury secondary to hemodynamic perturbation intraoperatively in light of underlying comorbidities plus failure to autoregulate) lisinopril with subsequent ATN  After review of records it appears that the patient has a baseline Creatinine of 1.8-2.2 mg/dL.  Admission creatinine of 2.08 mg/dL on 12/24/2024.  Creatinine today is at 2.46 mg/dL stable and improving.  Clinically appears to be hypovolemic recommend Plasma-Lyte 100 cc an hour for 1 L then DC  check BMP, magnesium, phosphorus in a.m.  Await renal recovery.  Place on a renal diet when allowed diet order.   Strict I/O.  Daily weights  Urinary retention protocol  Avoid nephrotoxins, adjust meds to appropriate GFR.  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 Girard

## 2024-12-30 LAB
ANION GAP SERPL CALCULATED.3IONS-SCNC: 8 MMOL/L (ref 4–13)
BASOPHILS # BLD AUTO: 0 THOUSANDS/ΜL (ref 0–0.1)
BASOPHILS NFR BLD AUTO: 0 % (ref 0–1)
BUN SERPL-MCNC: 55 MG/DL (ref 5–25)
CALCIUM SERPL-MCNC: 8.3 MG/DL (ref 8.4–10.2)
CHLORIDE SERPL-SCNC: 109 MMOL/L (ref 96–108)
CO2 SERPL-SCNC: 24 MMOL/L (ref 21–32)
CREAT SERPL-MCNC: 2.46 MG/DL (ref 0.6–1.3)
EOSINOPHIL # BLD AUTO: 0.1 THOUSAND/ΜL (ref 0–0.61)
EOSINOPHIL NFR BLD AUTO: 3 % (ref 0–6)
ERYTHROCYTE [DISTWIDTH] IN BLOOD BY AUTOMATED COUNT: 14.9 % (ref 11.6–15.1)
GFR SERPL CREATININE-BSD FRML MDRD: 23 ML/MIN/1.73SQ M
GLUCOSE SERPL-MCNC: 102 MG/DL (ref 65–140)
GLUCOSE SERPL-MCNC: 139 MG/DL (ref 65–140)
GLUCOSE SERPL-MCNC: 142 MG/DL (ref 65–140)
GLUCOSE SERPL-MCNC: 142 MG/DL (ref 65–140)
GLUCOSE SERPL-MCNC: 159 MG/DL (ref 65–140)
HCT VFR BLD AUTO: 23.4 % (ref 36.5–49.3)
HCT VFR BLD AUTO: 24.4 % (ref 36.5–49.3)
HGB BLD-MCNC: 7.5 G/DL (ref 12–17)
HGB BLD-MCNC: 8 G/DL (ref 12–17)
IMM GRANULOCYTES # BLD AUTO: 0.02 THOUSAND/UL (ref 0–0.2)
IMM GRANULOCYTES NFR BLD AUTO: 1 % (ref 0–2)
LYMPHOCYTES # BLD AUTO: 0.56 THOUSANDS/ΜL (ref 0.6–4.47)
LYMPHOCYTES NFR BLD AUTO: 14 % (ref 14–44)
MAGNESIUM SERPL-MCNC: 2 MG/DL (ref 1.9–2.7)
MCH RBC QN AUTO: 30.7 PG (ref 26.8–34.3)
MCHC RBC AUTO-ENTMCNC: 32.1 G/DL (ref 31.4–37.4)
MCV RBC AUTO: 96 FL (ref 82–98)
MONOCYTES # BLD AUTO: 0.43 THOUSAND/ΜL (ref 0.17–1.22)
MONOCYTES NFR BLD AUTO: 11 % (ref 4–12)
NEUTROPHILS # BLD AUTO: 2.88 THOUSANDS/ΜL (ref 1.85–7.62)
NEUTS SEG NFR BLD AUTO: 71 % (ref 43–75)
NRBC BLD AUTO-RTO: 0 /100 WBCS
PHOSPHATE SERPL-MCNC: 2.3 MG/DL (ref 2.3–4.1)
PLATELET # BLD AUTO: 135 THOUSANDS/UL (ref 149–390)
PMV BLD AUTO: 10.6 FL (ref 8.9–12.7)
POTASSIUM SERPL-SCNC: 3.7 MMOL/L (ref 3.5–5.3)
RBC # BLD AUTO: 2.44 MILLION/UL (ref 3.88–5.62)
SODIUM SERPL-SCNC: 141 MMOL/L (ref 135–147)
WBC # BLD AUTO: 3.99 THOUSAND/UL (ref 4.31–10.16)

## 2024-12-30 PROCEDURE — 97530 THERAPEUTIC ACTIVITIES: CPT

## 2024-12-30 PROCEDURE — 84100 ASSAY OF PHOSPHORUS: CPT | Performed by: SURGERY

## 2024-12-30 PROCEDURE — 83735 ASSAY OF MAGNESIUM: CPT | Performed by: SURGERY

## 2024-12-30 PROCEDURE — 99024 POSTOP FOLLOW-UP VISIT: CPT | Performed by: SURGERY

## 2024-12-30 PROCEDURE — 82948 REAGENT STRIP/BLOOD GLUCOSE: CPT

## 2024-12-30 PROCEDURE — 85025 COMPLETE CBC W/AUTO DIFF WBC: CPT | Performed by: SURGERY

## 2024-12-30 PROCEDURE — 99232 SBSQ HOSP IP/OBS MODERATE 35: CPT | Performed by: INTERNAL MEDICINE

## 2024-12-30 PROCEDURE — 97116 GAIT TRAINING THERAPY: CPT

## 2024-12-30 PROCEDURE — 85014 HEMATOCRIT: CPT

## 2024-12-30 PROCEDURE — 85018 HEMOGLOBIN: CPT

## 2024-12-30 PROCEDURE — 99233 SBSQ HOSP IP/OBS HIGH 50: CPT | Performed by: INTERNAL MEDICINE

## 2024-12-30 PROCEDURE — 80048 BASIC METABOLIC PNL TOTAL CA: CPT | Performed by: SURGERY

## 2024-12-30 RX ORDER — SODIUM CHLORIDE, SODIUM GLUCONATE, SODIUM ACETATE, POTASSIUM CHLORIDE, MAGNESIUM CHLORIDE, SODIUM PHOSPHATE, DIBASIC, AND POTASSIUM PHOSPHATE .53; .5; .37; .037; .03; .012; .00082 G/100ML; G/100ML; G/100ML; G/100ML; G/100ML; G/100ML; G/100ML
100 INJECTION, SOLUTION INTRAVENOUS CONTINUOUS
Status: DISPENSED | OUTPATIENT
Start: 2024-12-30 | End: 2024-12-30

## 2024-12-30 RX ADMIN — HYDROMORPHONE HYDROCHLORIDE 0.2 MG: 0.2 INJECTION, SOLUTION INTRAMUSCULAR; INTRAVENOUS; SUBCUTANEOUS at 01:48

## 2024-12-30 RX ADMIN — HEPARIN SODIUM 5000 UNITS: 5000 INJECTION, SOLUTION INTRAVENOUS; SUBCUTANEOUS at 13:03

## 2024-12-30 RX ADMIN — SODIUM CHLORIDE, SODIUM GLUCONATE, SODIUM ACETATE, POTASSIUM CHLORIDE, MAGNESIUM CHLORIDE, SODIUM PHOSPHATE, DIBASIC, AND POTASSIUM PHOSPHATE 100 ML/HR: .53; .5; .37; .037; .03; .012; .00082 INJECTION, SOLUTION INTRAVENOUS at 12:58

## 2024-12-30 RX ADMIN — METOROPROLOL TARTRATE 2.5 MG: 5 INJECTION, SOLUTION INTRAVENOUS at 23:20

## 2024-12-30 RX ADMIN — PANTOPRAZOLE SODIUM 40 MG: 40 INJECTION, POWDER, FOR SOLUTION INTRAVENOUS at 10:35

## 2024-12-30 RX ADMIN — METOROPROLOL TARTRATE 2.5 MG: 5 INJECTION, SOLUTION INTRAVENOUS at 17:50

## 2024-12-30 RX ADMIN — METOROPROLOL TARTRATE 2.5 MG: 5 INJECTION, SOLUTION INTRAVENOUS at 10:37

## 2024-12-30 RX ADMIN — LABETALOL HYDROCHLORIDE 10 MG: 5 INJECTION, SOLUTION INTRAVENOUS at 03:01

## 2024-12-30 RX ADMIN — HEPARIN SODIUM 5000 UNITS: 5000 INJECTION, SOLUTION INTRAVENOUS; SUBCUTANEOUS at 05:05

## 2024-12-30 RX ADMIN — HEPARIN SODIUM 5000 UNITS: 5000 INJECTION, SOLUTION INTRAVENOUS; SUBCUTANEOUS at 21:18

## 2024-12-30 RX ADMIN — METOROPROLOL TARTRATE 2.5 MG: 5 INJECTION, SOLUTION INTRAVENOUS at 05:05

## 2024-12-30 RX ADMIN — NICOTINE 1 PATCH: 14 PATCH, EXTENDED RELEASE TRANSDERMAL at 10:35

## 2024-12-30 RX ADMIN — ACETAMINOPHEN 975 MG: 325 TABLET, FILM COATED ORAL at 21:18

## 2024-12-30 RX ADMIN — INSULIN LISPRO 1 UNITS: 100 INJECTION, SOLUTION INTRAVENOUS; SUBCUTANEOUS at 13:01

## 2024-12-30 NOTE — ASSESSMENT & PLAN NOTE
Wt Readings from Last 3 Encounters:   12/30/24 84.1 kg (185 lb 6.5 oz)   12/24/24 80 kg (176 lb 5.9 oz)   12/18/24 79.4 kg (175 lb)

## 2024-12-30 NOTE — ASSESSMENT & PLAN NOTE
Patient is an 79 year old M who presented with portal venous gas highly suspicious for mesenteric ischemia with calcified aortic stenosis proximal to the SMA and celiac artery. Patient s/p exploratory laparotomy with retrograde open SMA stent on 12/14.  The bowel was noted to be pink and well-perfused intraoperatively.  ents, requiring as high as 3 L nasal cannula.  Incentive spirometry 2 L    Plan  -Consider pulling NG is having bms or resolving nausea  -SHOAIB showing improvement with 500x albumin + 500 isolyte, and 100 cc/hr once NPO in evening  -Restart Plavix 75 mg daily and BP meds once tolerating PO again  -Protonix  -Encourage ambulation/out of bed, 3 times daily  -Encourage incentive spirometer use, 10 times per hour  -PT/OT  -Wean supplemental O2  - May require pulmonary evaluation if unable to wean off of oxygen

## 2024-12-30 NOTE — ASSESSMENT & PLAN NOTE
SHOAIB most likely secondary to FELIPE 12/24+ ischemic injury secondary to hemodynamic perturbation intraoperatively in light of underlying comorbidities plus failure to autoregulate) lisinopril with subsequent ATN  After review of records it appears that the patient has a baseline Creatinine of 1.8-2.2 mg/dL.  Admission creatinine of 2.08 mg/dL on 12/24/2024.  Creatinine today is at 2.33 mg/dL stable and improving towards baseline.  Will give 1 L additional IV fluids half NS at 100 cc an hour then DC as clinically still appears to be hypovolemic  Okay to DC Archuleta catheter as long as strict I's and O's are done  check BMP, magnesium, phosphorus in a.m.  Await renal recovery.  Place on a renal diet when allowed diet order.   Strict I/O.  Daily weights  Urinary retention protocol  Avoid nephrotoxins, adjust meds to appropriate GFR.  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 Girard

## 2024-12-30 NOTE — ASSESSMENT & PLAN NOTE
Wt Readings from Last 3 Encounters:   12/31/24 83.7 kg (184 lb 8.4 oz)   12/24/24 80 kg (176 lb 5.9 oz)   12/18/24 79.4 kg (175 lb)   Diuretics per cardiology currently on hold  Recommend 1 L IV fluids today then DC

## 2024-12-30 NOTE — ASSESSMENT & PLAN NOTE
Lab Results   Component Value Date    EGFR 23 12/30/2024    EGFR 20 12/29/2024    EGFR 22 12/28/2024    CREATININE 2.46 (H) 12/30/2024    CREATININE 2.78 (H) 12/29/2024    CREATININE 2.59 (H) 12/28/2024

## 2024-12-30 NOTE — PROGRESS NOTES
Progress Note - Nephrology   Name: Jay Roach Jr. 79 y.o. male I MRN: [id number]  Unit/Bed#: [facility] 530-01 I Date of Admission: 12/24/2024   Date of Service: 12/31/2024 I Hospital Day: 7    79-year-old male with multiple comorbidities including CKD stage IV, peripheral arterial disease status post bilateral revascularization, TAVR, CABG, hypertension and bilateral carotid stenosis admitted with abdominal pain CTA suspicious for pneumatosis and mesenteric ischemia taken to the OR for expiratory lap with retrograde open SMA stent. Nephrology consulted for evaluation and management of acute kidney injury.   Assessment & Plan  SHOAIB (acute kidney injury) (HCC)  SHOAIB most likely secondary to FELIPE 12/24+ ischemic injury secondary to hemodynamic perturbation intraoperatively in light of underlying comorbidities plus failure to autoregulate) lisinopril with subsequent ATN  After review of records it appears that the patient has a baseline Creatinine of 1.8-2.2 mg/dL.  Admission creatinine of 2.08 mg/dL on 12/24/2024.  Creatinine today is at 2.33 mg/dL stable and improving towards baseline.  Will give 1 L additional IV fluids half NS at 100 cc an hour then DC as clinically still appears to be hypovolemic  Okay to DC Archuleta catheter as long as strict I's and O's are done  check BMP, magnesium, phosphorus in a.m.  Await renal recovery.  Place on a renal diet when allowed diet order.   Strict I/O.  Daily weights  Urinary retention protocol  Avoid nephrotoxins, adjust meds to appropriate GFR.  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 Girard  CKD (chronic kidney disease) stage 4, GFR 15-29 ml/min ([facility])  Lab Results   Component Value Date    EGFR 25 12/31/2024    EGFR 23 12/30/2024    EGFR 20 12/29/2024    CREATININE 2.33 (H) 12/31/2024    CREATININE 2.46 (H) 12/30/2024    CREATININE 2.78 (H) 12/29/2024   Review of  record shows baseline creatinine 1.8 to 2.2 mg/dL   As an outpatient follows up with Dr. 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    Mesenteric ischemia (HCC)  Status post expiratory laparotomy, lysis of adhesions mesenteric angiogram with SMA stenting on 12/24   Follow-up with vascular surgery and general surgery   Chronic diastolic CHF (congestive heart failure) (HCC)  Wt Readings from Last 3 Encounters:   12/31/24 83.7 kg (184 lb 8.4 oz)   12/24/24 80 kg (176 lb 5.9 oz)   12/18/24 79.4 kg (175 lb)   Diuretics per cardiology currently on hold  Recommend 1 L IV fluids today then DC    Primary hypertension  On Norvasc 10 mg p.o. daily, hydralazine 25 mg p.o. twice daily, Metroprolol 12.5 mg p.o. every 12 unable to get due to NGT  Placed on hydralazine 22 mg IV Q6 while remains n.p.o.    I have reviewed the nephrology recommendations including okay to DC Geremias recommend 1 L half NS, with surgical team, and we are in agreement with renal plan including the information outlined above.     Subjective   Brief History of Admission -   Patient seen and examined in his room no overnight events hemodynamically stable remains afebrile urine output 1.8 L / 24 hours wanting NGT removed.  Status post IV fluids yesterday      Objective :  Temp:  [97.9 °F (36.6 °C)-99.1 °F (37.3 °C)] 98.3 °F (36.8 °C)  HR:  [55-79] 75  BP: (161-182)/(49-60) 174/55  Resp:  [17] 17  SpO2:  [89 %-98 %] 97 %  O2 Device: Nasal cannula    Current Weight: Weight - Scale: 83.7 kg (184 lb 8.4 oz)  First Weight: Weight - Scale: 77.8 kg (171 lb 8.3 oz)  I/O         12/28 0701 12/29 0700 12/29 0701  12/30 0700 12/30 0701 12/31 0700    P.O. 1380 600     I.V. (mL/kg)  1195 (14.2)     IV Piggyback  500     Total Intake(mL/kg) 1380 (17.5) 2295 (27.3)     Urine (mL/kg/hr) 695 (0.4) 650 (0.3)     Emesis/NG output  50     Stool  0     Total Output 695 700     Net +685 +1595             Unmeasured Urine Occurrence 1 x 1 x     Unmeasured Stool Occurrence  1 x           Physical Exam  Vitals and nursing note reviewed.   Constitutional:       General: He is not in acute distress.     Appearance: Normal appearance. He is normal weight. He is not ill-appearing, toxic-appearing or diaphoretic.   HENT:      Head: Normocephalic and atraumatic.      Mouth/Throat:      Pharynx: No oropharyngeal exudate.   Eyes:      General: No scleral icterus.  Cardiovascular:      Rate and Rhythm: Normal rate.   Pulmonary:      Effort: No respiratory distress.      Breath sounds: No stridor. No wheezing.   Abdominal:      General: There is no distension.      Palpations: Abdomen is soft.      Tenderness: There is no abdominal tenderness.   Musculoskeletal:         General: No swelling.      Cervical back: Normal range of motion. No rigidity.   Skin:     Coloration: Skin is not jaundiced.   Neurological:      General: No focal deficit present.      Mental Status: He is alert and oriented to person, place, and time.   Psychiatric:         Mood and Affect: Mood normal.         Behavior: Behavior normal.       ROS:  Constitutional:  No chills, no fever.   HENT:  No sore throat  Respiratory:  No cough, no hemoptysis, no wheezing.    Cardiovascular:  no leg swelling.   Gastrointestinal:  No constipation, no diarrhea.   Genitourinary:  No dysuria and hematuria. No decreased urine output.  Musculoskeletal:  No back pain.   Neurological:  no dizziness, No headaches.   Psychiatric/Behavioral:  No agitation, no confusion.    Wounds: positive, negative  All other systems reviewed and are negative.        Medications:    Current Facility-Administered Medications:     acetaminophen (TYLENOL) tablet 975 mg, 975 mg, Oral, Q8H [facility], Roldan Wilkes MD, 975 mg at 12/31/24 0536    amLODIPine (NORVASC) tablet 10 mg, 10 mg, Oral, Daily, Renee Wooten PA-C, 10 mg at 12/29/24 0746    [Held by provider] atorvastatin (LIPITOR) tablet 80 mg,  80 mg, Oral, HS, Simón Cee MD, 80 mg at 12/28/24 2112    [Held by provider] calcium carbonate (TUMS) chewable tablet 500 mg, 500 mg, Oral, Daily PRN, Evelyn Appiah MD    [Held by provider] clopidogrel (PLAVIX) tablet 75 mg, 75 mg, Oral, Daily, Renee Wooten PA-C, 75 mg at 12/29/24 0746    heparin (porcine) subcutaneous injection 5,000 Units, 5,000 Units, Subcutaneous, Q8H TRENT, Simón Cee MD, 5,000 Units at 12/31/24 0536    hydrALAZINE (APRESOLINE) injection 20 mg, 20 mg, Intravenous, Q6H TRENT, Allison Dickinson MD    hydrALAZINE (APRESOLINE) injection 5 mg, 5 mg, Intravenous, Q4H PRN, Roldan Wilkes MD, 5 mg at 12/31/24 0447    HYDROmorphone (DILAUDID) injection 0.5 mg, 0.5 mg, Intravenous, Q3H PRN, Simón Cee MD    HYDROmorphone HCl (DILAUDID) injection 0.2 mg, 0.2 mg, Intravenous, Q2H PRN, Roldan Wilkes MD, 0.2 mg at 12/30/24 0148    insulin lispro (HumALOG/ADMELOG) 100 units/mL subcutaneous injection 1-5 Units, 1-5 Units, Subcutaneous, Q6H, 1 Units at 12/30/24 1301 **AND** [PENDING] insulin lispro (HumALOG/ADMELOG) 100 units/mL subcutaneous injection 1-5 Units, 1-5 Units, Subcutaneous, 4x Daily (AC & HS) **AND** Fingerstick Glucose (POCT), , , Q6H **AND** [PENDING] Fingerstick Glucose (POCT), , , BID, Roldan Wilkes MD    labetalol (NORMODYNE) injection 10 mg, 10 mg, Intravenous, Q3H PRN, Jb Samuel MD, 10 mg at 12/31/24 0827    metoclopramide (REGLAN) injection 10 mg, 10 mg, Intravenous, Q6H PRN, Evelyn Appiah MD, 10 mg at 12/28/24 1123    metoprolol tartrate (LOPRESSOR) partial tablet 12.5 mg, 12.5 mg, Oral, Q12H TRENT, Renee Wooten PA-C, 12.5 mg at 12/29/24 0746    nicotine (NICODERM CQ) 14 mg/24hr TD 24 hr patch 1 patch, 1 patch, Transdermal, Daily, Jb Samuel MD, 1 patch at 12/31/24 0814    ondansetron (ZOFRAN) injection 4 mg, 4 mg, Intravenous, Q4H PRN, Simón Cee MD, 4 mg at 12/29/24 1627    oxyCODONE (ROXICODONE) split tablet 2.5 mg, 2.5 mg, Oral, Q4H PRN, 2.5 mg at  12/29/24 0227 **OR** oxyCODONE (ROXICODONE) IR tablet 5 mg, 5 mg, Oral, Q4H PRN, Roldan Wilkes MD    pancrelipase (Lip-Prot-Amyl) (CREON) delayed release capsule 24,000 Units, 24,000 Units, Oral, TID With Meals, Evelyn Appiah MD, 24,000 Units at 12/29/24 1622    pantoprazole (PROTONIX) EC tablet 40 mg, 40 mg, Oral, Early Morning, Renee Wooten PA-C, 40 mg at 12/29/24 0510    [Held by provider] polyethylene glycol (MIRALAX) packet 17 g, 17 g, Oral, Daily, Evelyn Appiah MD, 17 g at 12/29/24 0800    [Held by provider] senna-docusate sodium (SENOKOT S) 8.6-50 mg per tablet 1 tablet, 1 tablet, Oral, HS, Roldan Wilkes MD, 1 tablet at 12/28/24 2112    [Held by provider] simethicone (MYLICON) chewable tablet 80 mg, 80 mg, Oral, Q6H PRN, Roldan Wilkes MD, 80 mg at 12/29/24 1627    sodium chloride infusion 0.45 %, 100 mL/hr, Intravenous, Continuous, Allison Dickinson MD      Lab Results: I have reviewed the following results:  Results from last 7 days   Lab Units 12/31/24  0049 12/30/24  1426 12/30/24  0328 12/29/24  0338 12/28/24  1222 12/28/24  0302 12/27/24  1431 12/26/24  0558 12/25/24  0848 12/25/24  0342 12/25/24  0323 12/24/24  2103 12/24/24  1636 12/24/24  1527 12/24/24  1408   0000   WBC Thousand/uL 4.46  --  3.99*  --  7.42  --   --  10.12  --   --  12.58*  --  16.68*  --   --   --    HEMOGLOBIN g/dL 8.6* 8.0* 7.5*  --  8.9*  --   --  8.3* 8.3*  --  8.4*   < > 9.2*  --   --   --    I STAT HEMOGLOBIN g/dl  --   --   --   --   --   --   --   --   --   --   --   --   --  8.5* 9.2*  --    HEMATOCRIT % 26.2* 24.4* 23.4*  --  28.1*  --   --  26.1*  --   --  25.5*  --  28.1*  --   --   --    HEMATOCRIT, ISTAT %  --   --   --   --   --   --   --   --   --   --   --   --   --  25* 27*  --    PLATELETS Thousands/uL 157  --  135*  --  155  --   --  131*  --   --  130*  --  147*  --   --   --    POTASSIUM mmol/L 4.0  --  3.7 3.5 3.7 3.8 3.7 3.9  --  4.3  --   --  3.7  --   --    < >   CHLORIDE mmol/L 109*  --  109* 106 106  "107 109* 112*  --  112*  --   --  113*  --   --    < >   CO2 mmol/L 25  --  24 24 25 23 24 25  --  24  --   --  21  --   --    < >   CO2, I-STAT mmol/L  --   --   --   --   --   --   --   --   --   --   --   --   --  24 24  --    BUN mg/dL 52*  --  55* 54* 48* 46* 43* 38*  --  35*  --   --  36*  --   --    < >   CREATININE mg/dL 2.33*  --  2.46* 2.78* 2.59* 2.52* 2.40* 1.85*  --  1.84*  --   --  1.75*  --   --    < >   CALCIUM mg/dL 8.6  --  8.3* 8.1* 8.3* 8.2* 8.2* 8.5  --  8.4  --   --  7.7*  --   --    < >   MAGNESIUM mg/dL 2.1  --  2.0 2.0  --   --   --  2.3  --  2.1  --   --  1.4*  --   --   --    PHOSPHORUS mg/dL 2.7  --  2.3 3.2  --   --   --  3.1  --  3.6  --   --  3.3  --   --   --    ALBUMIN g/dL  --   --   --   --   --   --   --   --   --   --   --   --  2.8*  --   --   --    GLUCOSE, ISTAT mg/dl  --   --   --   --   --   --   --   --   --   --   --   --   --  123 111  --     < > = values in this interval not displayed.       Administrative Statements     Portions of the record may have been created with voice recognition software. Occasional wrong word or \"sound a like\" substitutions may have occurred due to the inherent limitations of voice recognition software. Read the chart carefully and recognize, using context, where substitutions have occurred.If you have any questions, please contact the dictating provider.  " No

## 2024-12-30 NOTE — ASSESSMENT & PLAN NOTE
Lab Results   Component Value Date    EGFR 25 12/31/2024    EGFR 23 12/30/2024    EGFR 20 12/29/2024    CREATININE 2.33 (H) 12/31/2024    CREATININE 2.46 (H) 12/30/2024    CREATININE 2.78 (H) 12/29/2024   Review of record shows baseline creatinine 1.8 to 2.2 mg/dL   As an outpatient follows up with Dr. 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

## 2024-12-30 NOTE — RESTORATIVE TECHNICIAN NOTE
Restorative Technician Note      Patient Name: Jay HAY Marley Harrison     Restorative Tech Visit Date: 12/30/24  Note Type: Mobility  Patient Position Upon Consult: Supine  Mobility / Activity Provided: answered pt bed alarm going off  Activity Performed: Repositioned  Patient Position at End of Consult: Supine; All needs within reach; Bed/Chair alarm activated

## 2024-12-30 NOTE — PROGRESS NOTES
Progress Note - Surgery-General   Name: Jay Roach Jr. 79 y.o. male I MRN: [id number]  [facility] I Date of Admission: 12/24/2024   Date of Service: 12/30/2024 I Hospital Day: 6    Assessment & Plan  Mesenteric ischemia (HCC)  Patient is an 79 year old M who presented with portal venous gas highly suspicious for mesenteric ischemia with calcified aortic stenosis proximal to the SMA and celiac artery. Patient s/p exploratory laparotomy with retrograde open SMA stent on 12/14.  The bowel was noted to be pink and well-perfused intraoperatively.  ents, requiring as high as 3 L nasal cannula.  Incentive spirometry 2 L    Plan  -Consider pulling NG is having bms or resolving nausea  -SHOAIB showing improvement with 500x albumin + 500 isolyte, and 100 cc/hr once NPO in evening  -Restart Plavix 75 mg daily and BP meds once tolerating PO again  -Protonix  -Encourage ambulation/out of bed, 3 times daily  -Encourage incentive spirometer use, 10 times per hour  -PT/OT  -Wean supplemental O2  - May require pulmonary evaluation if unable to wean off of oxygen  Chronic diastolic CHF (congestive heart failure) ([facility])  Wt Readings from Last 3 Encounters:   12/30/24 84.1 kg (185 lb 6.5 oz)   12/24/24 80 kg (176 lb 5.9 oz)   12/18/24 79.4 kg (175 lb)             CKD (chronic kidney disease) stage 4, GFR 15-29 ml/min ([facility])  Lab Results   Component Value Date    EGFR 23 12/30/2024    EGFR 20 12/29/2024    EGFR 22 12/28/2024    CREATININE 2.46 (H) 12/30/2024    CREATININE 2.78 (H) 12/29/2024    CREATININE 2.59 (H) 12/28/2024     SHOAIB (acute kidney injury) ([facility])      Subjective   Felt nauseous last night, found to have an ileus. Feeling better this morning    Objective :  Temp:  [98 °F (36.7 °C)-99.1 °F (37.3 °C)] 98 °F (36.7 °C)  HR:  [61-73] 73  BP: (151-163)/(44-50) 158/50  Resp:  [17-20] 18  SpO2:  [85 %-94 %] 88 %  O2 Device: Nasal cannula  Nasal Cannula O2 Flow Rate (L/min):  [2 L/min-44 L/min] 44 L/min    I/O          12/28 0701  12/29 0700 12/29 0701  12/30 0700 12/30 0701  12/31 0700    P.O. 1380 600     I.V. (mL/kg)  1195 (14.2)     IV Piggyback  500     Total Intake(mL/kg) 1380 (17.5) 2295 (27.3)     Urine (mL/kg/hr) 695 (0.4) 650 (0.3)     Emesis/NG output  50     Stool  0     Total Output 695 700     Net +685 +1595            Unmeasured Urine Occurrence 1 x 1 x     Unmeasured Stool Occurrence  1 x           Lines/Drains/Airways       Active Status       Name Placement date Placement time Site Days    NG/OG/Enteral Tube Nasogastric Left nare 12/30/24  0058  Left nare  less than 1    Urethral Catheter Latex 16 Fr. 12/29/24  2245  -- less than 1                  Physical Exam  Constitutional:       Appearance: Normal appearance.   HENT:      Head: Normocephalic and atraumatic.      Mouth/Throat:      Mouth: Mucous membranes are moist.   Eyes:      Extraocular Movements: Extraocular movements intact.   Cardiovascular:      Rate and Rhythm: Normal rate and regular rhythm.   Pulmonary:      Effort: Pulmonary effort is normal.   Abdominal:      General: Abdomen is flat. There is distension.      Palpations: Abdomen is soft.      Tenderness: There is abdominal tenderness. There is no guarding or rebound.   Musculoskeletal:         General: Normal range of motion.      Cervical back: Normal range of motion and neck supple.   Skin:     General: Skin is warm and dry.   Neurological:      General: No focal deficit present.      Mental Status: He is alert and oriented to person, place, and time.       Felt nauseous overnight, found to have an ileus. Feeling better this morning      Lab Results: I have reviewed the following results:  Recent Labs     12/30/24  0328   WBC 3.99*   HGB 7.5*   HCT 23.4*   *   SODIUM 141   K 3.7   *   CO2 24   BUN 55*   CREATININE 2.46*   GLUC 139   MG 2.0   PHOS 2.3

## 2024-12-30 NOTE — PHYSICAL THERAPY NOTE
Physical Therapy Treatment Note    Patient's Name: Jay Roach Jr.  : 1945     12/30/24 1204   PT Last Visit   PT Visit Date 24   Note Type   Note Type Treatment   Pain Assessment   Pain Assessment Tool 0-10   Pain Score 2   Pain Location/Orientation Location: [facility]   Hospital Pain Intervention(s) Ambulation/increased activity   Restrictions/Precautions   Weight Bearing Precautions Per Order No   Other Precautions Impulsive;Chair Alarm;Bed Alarm;Aspiration;Multiple lines;O2;Fall Risk;Pain  (NPO/NGT, O2 4L)   General   Chart Reviewed Yes   Response to Previous Treatment Patient with no complaints from previous session.   Family/Caregiver Present No   Subjective   Subjective Agreeable to mobilize.   Bed Mobility   Additional Comments Pt greeted in chair.   Transfers   Sit to Stand 5  Supervision   Additional items Increased time required;Verbal cues;Armrests   Stand to Sit 5  Supervision   Additional items Armrests;Increased time required;Verbal cues   Additional Comments RW   Ambulation/Elevation   Gait pattern Decreased heel strike;Decreased foot clearance;Forward Flexion   Gait Assistance 4  Minimal assist   Additional items Assist x 1;Verbal cues;Tactile cues   Assistive Device Rolling walker   Distance 90'x2   Stair Management Assistance 4  Minimal assist   Additional items Assist x 1;Verbal cues;Tactile cues   Stair Management Technique Two rails;Reciprocal   Number of Stairs 7   Balance   Static Sitting Good   Dynamic Sitting Fair +   Static Standing Fair   Dynamic Standing Fair -   Ambulatory Poor +  (RW)   Endurance Deficit   Endurance Deficit Yes   Endurance Deficit Description weakness, DEMARCO (SpO2 90% and above 4L), fatigue   Activity Tolerance   Activity Tolerance Patient limited by fatigue   Nurse Made Aware yes - cleared for PT   Assessment   Prognosis Good   Problem List Decreased strength;Decreased endurance;Impaired balance;Decreased mobility;Pain   Assessment Initiated  session w/ t/f training - pt demonstrated progress compared to previous session as he is now at S level. Pt impulsive w/ gait training, required cues to safely maneuver around objects, for pacing, + to maintain CoM within [place] + improve foot clearance. From a PT standpoint recommend HHPT upon d/c w/ further progress.   Barriers to Discharge Inaccessible home environment   Goals   Patient Goals to walk   Plan   Treatment/Interventions Functional transfer training;LE strengthening/ROM;Elevations;Therapeutic exercise;Endurance training;Patient/family training;Equipment eval/education;Bed mobility;Gait training;Compensatory technique education;Spoke to nursing   Progress Progressing toward goals   PT Frequency 3-5x/wk   Discharge Recommendation   Rehab Resource Intensity Level, PT III (Minimum Resource Intensity)   Equipment Recommended Walker   Walker Package Recommended Wheeled walker   Change/add to Walker Package? No   AM-PAC Basic Mobility Inpatient   Turning in Flat Bed Without Bedrails 3   Lying on Back to Sitting on Edge of Flat Bed Without Bedrails 3   Moving Bed to Chair 3   Standing Up From Chair Using Arms 3   Walk in Room 3   Climb 3-5 Stairs With Railing 3   Basic Mobility Inpatient Raw Score 18   Basic Mobility Standardized Score 41.05   [facility] Highest Level Of Mobility   -HLM Goal 6: Walk 10 steps or more   -HLM Achieved 7: Walk 25 feet or more   Education   Education Provided Mobility training;Assistive device   Patient Demonstrates acceptance/verbal understanding;Reinforcement needed   End of Consult   Patient Position at End of Consult Bedside chair;Bed/Chair alarm activated;All needs within reach  (on waffle cushion, all lines in tact, NGT to suction, BLE elevated)       Teresita Sandoval, PT, DPT

## 2024-12-30 NOTE — OCCUPATIONAL THERAPY NOTE
Occupational Therapy cx        Patient Name: Jay Roach .  Today's Date: 12/30/2024 12/30/24 1400   OT Last Visit   OT Visit Date 12/30/24   Note Type   Note Type Treatment   Cancel Reasons Refusal  (pt states he has completed all adl tasks, no need for restroom, has been up 3x today. will f/u as able.)         Vee Malik, DESTINEE, OTR/L

## 2024-12-30 NOTE — PROGRESS NOTES
Cardiology Progress Note.   Unit/Bed#: [facility] 530-01 Encounter: [id number]        Jay Roach Jr. 79 y.o. male [id number]  Hospital Stay Days: 6    Assessment and Plan      Current Problem List   Principal Problem:    Mesenteric ischemia ([facility])  Active Problems:    S/P CABG (coronary artery bypass graft)    Coronary artery disease involving native coronary artery of native heart without angina pectoris    Sinus bradycardia    CKD (chronic kidney disease) stage 4, GFR 15-29 ml/min ([facility])    Chronic diastolic CHF (congestive heart failure) ([facility])    S/P TAVR (transcatheter aortic valve replacement)    SHOAIB (acute kidney injury) ([facility])    Assessment/Plan:    This is 79-year-old male presenting with abdominal pain and was found to have mesenteric ischemia.  Cardiology was consulted for preop risk assessment.  Patient is now postop ex lap and SMA stenting.      # Mesenteric ischemia on presentation status post ex lap and stenting  # SHOAIB on CKD4  # History of CAD status post CABG: LIMA to LAD and SVG to OM patent   # PCI to RCA in  pre-TAVR  # Hypertension  # Bilateral carotid disease  # Dyslipidemia  # PAD status post bilateral revascularization  # AS s/p TAVR in : 29MM IRVIN KYELR S3 ULTRA VALVE   # CKD stage 4  # History of GI bleed status post clipping: Aspirin stopped.    LVEF 65%.    Plan:    Doing well postoperatively.  SHOAIB noted.  Likely prerenal.  Getting IV fluids.  Reassess tomorrow.  Nephrology consulted.  Euvolemic on exam.  C/w Plavix and Lipitor for history of CAD/CABG.  Rest of the care per primary team.        Subjective     Was seen and examined today.  Denies any chest pain or shortness of breath  Objective     Vitals: Temp (24hrs), Av.4 °F (36.9 °C), Min:98 °F (36.7 °C), Max:99.1 °F (37.3 °C)  Current: Temperature: 98.2 °F (36.8 °C)  Patient Vitals for the past 24 hrs:   BP Temp Temp src Pulse Resp SpO2 Weight   24 1047 157/50 98.2 °F (36.8 °C) Oral 68 18 93 % --    12/30/24 0800 -- -- -- -- -- 90 % --   12/30/24 0718 158/50 98 °F (36.7 °C) Oral 73 18 -- --   12/30/24 0511 -- -- -- -- -- -- 84.1 kg (185 lb 6.5 oz)   12/29/24 2344 (!) 156/47 -- -- 73 -- 90 % --   12/29/24 1924 (!) 160/48 98.2 °F (36.8 °C) -- 69 20 91 % --   12/29/24 1554 (!) 162/49 -- -- 71 -- 91 % --   12/29/24 1550 (!) 163/48 99.1 °F (37.3 °C) -- 70 -- 91 % --   12/29/24 1448 -- -- -- 61 -- 94 % --    Body mass index is 26.6 kg/m².  Physical Exam:  Physical Exam  Vitals reviewed.   Constitutional:       General: He is not in acute distress.     Appearance: He is well-developed. He is not diaphoretic.   Cardiovascular:      Rate and Rhythm: Normal rate and regular rhythm.      Heart sounds: Normal heart sounds. No murmur heard.     No friction rub.   Pulmonary:      Effort: Pulmonary effort is normal. No respiratory distress.      Breath sounds: Normal breath sounds. No stridor. No wheezing.   Psychiatric:         Mood and Affect: Mood normal.         Thought Content: Thought content normal.         Judgment: Judgment normal.         Invasive Devices       Peripheral Intravenous Line  Duration             Peripheral IV 12/28/24 Dorsal (posterior);Right Forearm 2 days    Peripheral IV 12/30/24 Dorsal (posterior);Left Forearm <1 day              Drain  Duration             NG/OG/Enteral Tube Nasogastric Left nare <1 day    Urethral Catheter Latex 16 Fr. <1 day                        Labs:   Results from last 7 days   Lab Units 12/30/24  1426 12/30/24  0328 12/28/24  1222 12/26/24  0558 12/25/24  0848 12/25/24  0323 12/24/24  2103 12/24/24  1636 12/24/24  1527 12/24/24  1408 12/24/24  0128   WBC Thousand/uL  --  3.99* 7.42 10.12  --  12.58*  --  16.68*  --   --  11.11*   HEMOGLOBIN g/dL 8.0* 7.5* 8.9* 8.3* 8.3* 8.4* 9.1* 9.2*  --   --  12.2   I STAT HEMOGLOBIN g/dl  --   --   --   --   --   --   --   --  8.5* 9.2*  --    HEMATOCRIT % 24.4* 23.4* 28.1* 26.1*  --  25.5*  --  28.1*  --   --  36.6   HEMATOCRIT,  ISTAT %  --   --   --   --   --   --   --   --  25* 27*  --    PLATELETS Thousands/uL  --  135* 155 131*  --  130*  --  147*  --   --  186   SEGS PCT %  --  71 85* 89*  --  88*  --   --   --   --  86*   MONO PCT %  --  11 5 4  --  6  --   --   --   --  3*   EOS PCT %  --  3 1 0  --  0  --   --   --   --  2      Results from last 7 days   Lab Units 12/30/24  0328 12/29/24  0338 12/28/24  1222 12/28/24  0302 12/27/24  1431 12/26/24  0558 12/25/24  0342 12/24/24  1636 12/24/24  1527 12/24/24  1408 12/24/24  0603 12/24/24  0128   SODIUM mmol/L 141 138 138 140 140 145 142 142  --   --   --  140   POTASSIUM mmol/L 3.7 3.5 3.7 3.8 3.7 3.9 4.3 3.7  --   --   --  3.6   CHLORIDE mmol/L 109* 106 106 107 109* 112* 112* 113*  --   --   --  105   CO2 mmol/L 24 24 25 23 24 25 24 21  --   --   --  23   CO2, I-STAT mmol/L  --   --   --   --   --   --   --   --  24 24  --   --    BUN mg/dL 55* 54* 48* 46* 43* 38* 35* 36*  --   --   --  41*   CREATININE mg/dL 2.46* 2.78* 2.59* 2.52* 2.40* 1.85* 1.84* 1.75*  --   --   --  2.08*   CALCIUM mg/dL 8.3* 8.1* 8.3* 8.2* 8.2* 8.5 8.4 7.7*  --   --   --  9.9   ALK PHOS U/L  --   --   --   --   --   --   --  56  --   --   --  80   ALT U/L  --   --   --   --   --   --   --  5*  --   --   --  7   AST U/L  --   --   --   --   --   --   --  9*  --   --   --  13   GLUCOSE, ISTAT mg/dl  --   --   --   --   --   --   --   --  123 111  --   --    MAGNESIUM mg/dL 2.0 2.0  --   --   --  2.3 2.1 1.4*  --   --   --   --    PHOSPHORUS mg/dL 2.3 3.2  --   --   --  3.1 3.6 3.3  --   --   --   --    INR   --   --   --   --   --   --   --  1.29*  --   --  1.02  --    PTT seconds  --   --   --   --   --   --   --  132*  --   --  28  --    EGFR ml/min/1.73sq m 23 20 22 22 24 33 33 35  --   --   --  28     Results from last 7 days   Lab Units 12/24/24  1636 12/24/24  0603   INR  1.29* 1.02   PTT seconds 132* 28     Results from last 7 days   Lab Units 12/25/24  0329   LACTIC ACID mmol/L 0.9         No results found  "for: \"PHART\", \"OPA1YEL\", \"PO2ART\", \"LWN1ZGV\", \"V9NTAIRK\", \"BEART\", \"SOURCE\"  No components found for: \"HIV1X2\"  Lab Results   Component Value Date    HEPCAB Non-reactive 09/24/2021     No results found for: \"SPEP\", \"UPEP\"   Lab Results   Component Value Date    HGBA1C 6.6 (H) 12/24/2024    HGBA1C 7.3 (H) 09/04/2024    HGBA1C 6.6 (H) 03/05/2024     No results found for: \"CHOL\"   Lab Results   Component Value Date    HDL 52 05/05/2022    HDL 31 (L) 06/30/2021    HDL 39 (L) 09/15/2020      Lab Results   Component Value Date    LDLCALC 33 05/05/2022    LDLCALC 36 06/30/2021    LDLCALC 36 09/15/2020      Lab Results   Component Value Date    TRIG 50 05/05/2022    TRIG 90 06/30/2021    TRIG 85 09/15/2020     No components found for: \"PROCAL\"      Micro:      Urinalysis:  No results found for: \"AMPHETUR\", \"BDZUR\", \"COCAINEUR\", \"OPIATEUR\", \"PCPUR\", \"THCUR\", \"ETOH\", \"ACTMNPHEN\", \"SALICYLATE\"   Results from last 7 days   Lab Units 12/24/24  0140   COLOR UA  Light Yellow   CLARITY UA  Clear   SPEC GRAV UA  1.011   PH UA  5.5   LEUKOCYTES UA  Negative   NITRITE UA  Negative   GLUCOSE UA mg/dl Negative   KETONES UA mg/dl Negative   BILIRUBIN UA  Negative   BLOOD UA  Negative      Results from last 7 days   Lab Units 12/24/24  0140   RBC UA /hpf None Seen   WBC UA /hpf None Seen   EPITHELIAL CELLS WET PREP /hpf None Seen   BACTERIA UA /hpf None Seen      Intake and Outputs:  I/O         12/28 0701 12/29 0700 12/29 0701 12/30 0700 12/30 0701 12/31 0700    P.O. 1380 600     I.V. (mL/kg)  1195 (14.2)     IV Piggyback  500     Total Intake(mL/kg) 1380 (17.5) 2295 (27.3)     Urine (mL/kg/hr) 695 (0.4) 650 (0.3)     Emesis/NG output  50     Stool  0     Total Output 695 700     Net +685 +1595            Unmeasured Urine Occurrence 1 x 1 x     Unmeasured Stool Occurrence  1 x           Nutrition:  Diet NPO; Sips with meds  Radiology Results:   XR chest portable   Final Result by Antoni Lima MD (12/30 8115)    "   Enteric tube tip projects over the left upper quadrant.            Workstation performed: [id number]         XR abdomen 1 vw portable   Final Result by Antoni Lima MD (12/30 0904)      ET tube courses inferior to the diaphragm with tip projecting over the left upper quadrant..               Workstation performed: [id number]         XR abdomen 1 view kub   Final Result by Jaqui Max MD (12/30 1246)      Small bowel gaseous dilation may be postoperative ileus.         Workstation performed: [id number]         XR chest portable   Final Result by Antoni Lima MD (12/29 1136)      No acute cardiopulmonary disease.      Small pleural effusions.      Workstation performed: [id number]         IR mesenteric/visceral angiogram   Final Result by HIGINIO SOSA (12/24 6388)        Scheduled Medications:  acetaminophen, 975 mg, Q8H TRENT  [Held by provider] amLODIPine, 10 mg, Daily  [Held by provider] atorvastatin, 80 mg, HS  [Held by provider] clopidogrel, 75 mg, Daily  heparin (porcine), 5,000 Units, Q8H TRENT  [Held by provider] hydrALAZINE, 25 mg, BID  insulin lispro, 1-5 Units, Q6H  metoprolol, 2.5 mg, Q6H  [Held by provider] metoprolol tartrate, 12.5 mg, Q12H TRENT  nicotine, 1 patch, Daily  pancrelipase (Lip-Prot-Amyl), 24,000 Units, TID With Meals  [Held by provider] pantoprazole, 40 mg, Early Morning  pantoprazole, 40 mg, Q24H TRENT  [Held by provider] polyethylene glycol, 17 g, Daily  [Held by provider] senna-docusate sodium, 1 tablet, HS      PRN MEDS:  [Held by provider] calcium carbonate, 500 mg, Daily PRN  hydrALAZINE, 5 mg, Q4H PRN  HYDROmorphone, 0.5 mg, Q3H PRN  HYDROmorphone, 0.2 mg, Q2H PRN  labetalol, 10 mg, Q3H PRN  metoclopramide, 10 mg, Q6H PRN  ondansetron, 4 mg, Q4H PRN  oxyCODONE, 2.5 mg, Q4H PRN   Or  oxyCODONE, 5 mg, Q4H PRN  [Held by provider] simethicone, 80 mg, Q6H PRN      Last 24 Hour Meds: :   Medication Administration - last 24 hours from  12/29/2024 1437 to 12/30/2024 1437         Date/Time Order Dose Route Action Action by     12/30/2024 1035 EST nicotine (NICODERM CQ) 14 mg/24hr TD 24 hr patch 1 patch 1 patch Transdermal Medication Applied Albin Canas RN     12/30/2024 1034 EST nicotine (NICODERM CQ) 14 mg/24hr TD 24 hr patch 1 patch 1 patch Transdermal Patch Removed Albin Canas RN     12/29/2024 1627 EST ondansetron (ZOFRAN) injection 4 mg 4 mg Intravenous Given Alcira Chan RN     12/30/2024 1303 EST heparin (porcine) subcutaneous injection 5,000 Units 5,000 Units Subcutaneous Given Albin Canas RN     12/30/2024 0505 EST heparin (porcine) subcutaneous injection 5,000 Units 5,000 Units Subcutaneous Given Dian Painter     12/29/2024 2148 EST heparin (porcine) subcutaneous injection 5,000 Units 5,000 Units Subcutaneous Given Dian Painter     12/30/2024 1302 EST acetaminophen (TYLENOL) tablet 975 mg 975 mg Oral Not Given Albin Canas RN     12/30/2024 0500 EST acetaminophen (TYLENOL) tablet 975 mg 975 mg Oral Not Given Dian Painter     12/29/2024 2139 EST acetaminophen (TYLENOL) tablet 975 mg 975 mg Oral Not Given Dian Painter     12/30/2024 0148 EST HYDROmorphone HCl (DILAUDID) injection 0.2 mg 0.2 mg Intravenous Given Dian Painter     01/04/2025 0900 EST clopidogrel (PLAVIX) tablet 75 mg -- Oral Held Dose (none)     01/03/2025 0900 EST clopidogrel (PLAVIX) tablet 75 mg -- Oral Held Dose (none)     01/02/2025 0900 EST clopidogrel (PLAVIX) tablet 75 mg -- Oral Held Dose (none)     01/01/2025 0900 EST clopidogrel (PLAVIX) tablet 75 mg -- Oral Held Dose Roldan Wilkes MD     12/31/2024 0900 EST clopidogrel (PLAVIX) tablet 75 mg -- Oral Held Dose Roldan Wilkes MD     12/30/2024 0900 EST clopidogrel (PLAVIX) tablet 75 mg -- Oral Held Dose Roldan Wilkes MD     12/29/2024 2317 EST clopidogrel (PLAVIX) tablet 75 mg -- Oral Held by provider Roldan Wilkes MD     01/04/2025 0600 EST pantoprazole (PROTONIX) EC tablet 40 mg -- Oral  Held Dose (none)     01/03/2025 0600 EST pantoprazole (PROTONIX) EC tablet 40 mg -- Oral Held Dose (none)     01/02/2025 0600 EST pantoprazole (PROTONIX) EC tablet 40 mg -- Oral Held Dose (none)     01/01/2025 0600 EST pantoprazole (PROTONIX) EC tablet 40 mg -- Oral Held Dose Roldan Wilkes MD     12/31/2024 0600 EST pantoprazole (PROTONIX) EC tablet 40 mg -- Oral Held Dose Roldan Wilkes MD     12/30/2024 0600 EST pantoprazole (PROTONIX) EC tablet 40 mg 0 mg Oral Hold Dian Burchjulio     12/29/2024 2317 EST pantoprazole (PROTONIX) EC tablet 40 mg -- Oral Held by provider Roldan Wilkes MD     01/04/2025 0900 EST amLODIPine (NORVASC) tablet 10 mg -- Oral Held Dose (none)     01/03/2025 0900 EST amLODIPine (NORVASC) tablet 10 mg -- Oral Held Dose (none)     01/02/2025 0900 EST amLODIPine (NORVASC) tablet 10 mg -- Oral Held Dose (none)     01/01/2025 0900 EST amLODIPine (NORVASC) tablet 10 mg -- Oral Held Dose Roldan Wilkes MD     12/31/2024 0900 EST amLODIPine (NORVASC) tablet 10 mg -- Oral Held Dose Roldan Wilkes MD     12/30/2024 0900 EST amLODIPine (NORVASC) tablet 10 mg -- Oral Held Dose Roldan Wilkes MD     12/29/2024 2317 EST amLODIPine (NORVASC) tablet 10 mg -- Oral Held by provider Roldan Wilkes MD     01/04/2025 2200 EST atorvastatin (LIPITOR) tablet 80 mg -- Oral Held Dose (none)     01/03/2025 2200 EST atorvastatin (LIPITOR) tablet 80 mg -- Oral Held Dose (none)     01/02/2025 2200 EST atorvastatin (LIPITOR) tablet 80 mg -- Oral Held Dose (none)     01/01/2025 2200 EST atorvastatin (LIPITOR) tablet 80 mg -- Oral Held Dose Roldan Wilkes MD     12/31/2024 2200 EST atorvastatin (LIPITOR) tablet 80 mg -- Oral Held Dose Roldan Wilkes MD     12/30/2024 2200 EST atorvastatin (LIPITOR) tablet 80 mg -- Oral Held Dose Roldan Wilkes MD     12/29/2024 2317 EST atorvastatin (LIPITOR) tablet 80 mg -- Oral Held by provider Roldan Wilkes MD     12/29/2024 2139 EST atorvastatin (LIPITOR) tablet 80 mg 80 mg Oral Not Given  Diorra Abdouche     01/04/2025 2100 EST hydrALAZINE (APRESOLINE) tablet 25 mg -- Oral Held Dose (none)     01/04/2025 0900 EST hydrALAZINE (APRESOLINE) tablet 25 mg -- Oral Held Dose (none)     01/03/2025 2100 EST hydrALAZINE (APRESOLINE) tablet 25 mg -- Oral Held Dose (none)     01/03/2025 0900 EST hydrALAZINE (APRESOLINE) tablet 25 mg -- Oral Held Dose (none)     01/02/2025 2100 EST hydrALAZINE (APRESOLINE) tablet 25 mg -- Oral Held Dose (none)     01/02/2025 0900 EST hydrALAZINE (APRESOLINE) tablet 25 mg -- Oral Held Dose (none)     01/01/2025 2100 EST hydrALAZINE (APRESOLINE) tablet 25 mg -- Oral Held Dose Roldan Wilkes MD     01/01/2025 0900 EST hydrALAZINE (APRESOLINE) tablet 25 mg -- Oral Held Dose Roldan Wilkes MD     12/31/2024 2100 EST hydrALAZINE (APRESOLINE) tablet 25 mg -- Oral Held Dose Roldan Wilkes MD     12/31/2024 0900 EST hydrALAZINE (APRESOLINE) tablet 25 mg -- Oral Held Dose Roldan Wilkes MD     12/30/2024 2100 EST hydrALAZINE (APRESOLINE) tablet 25 mg -- Oral Held Dose Roldan Wilkes MD     12/30/2024 0900 EST hydrALAZINE (APRESOLINE) tablet 25 mg -- Oral Held Dose Roldan Wilkes MD     12/29/2024 2317 EST hydrALAZINE (APRESOLINE) tablet 25 mg -- Oral Held by provider Roldan Wilkes MD     12/29/2024 2140 EST hydrALAZINE (APRESOLINE) tablet 25 mg 25 mg Oral Not Given Diorra Abdouche     12/30/2024 0301 EST labetalol (NORMODYNE) injection 10 mg 10 mg Intravenous Given Diorra Abdouche     01/02/2025 2200 EST senna-docusate sodium (SENOKOT S) 8.6-50 mg per tablet 1 tablet -- Oral Held Dose Roldan Wilkes MD     01/01/2025 2200 EST senna-docusate sodium (SENOKOT S) 8.6-50 mg per tablet 1 tablet -- Oral Held Dose Roldan Wilkes MD     12/31/2024 2200 EST senna-docusate sodium (SENOKOT S) 8.6-50 mg per tablet 1 tablet -- Oral Held Dose Roldan Wilkes MD     12/30/2024 2200 EST senna-docusate sodium (SENOKOT S) 8.6-50 mg per tablet 1 tablet -- Oral Held Dose Roldan Wilkes MD     12/29/2024 2317 EST  senna-docusate sodium (SENOKOT S) 8.6-50 mg per tablet 1 tablet -- Oral Held by provider Roldan Wilkes MD     12/29/2024 2140 EST senna-docusate sodium (SENOKOT S) 8.6-50 mg per tablet 1 tablet 1 tablet Oral Not Given Orionrra Madina     01/03/2025 2100 EST metoprolol tartrate (LOPRESSOR) partial tablet 12.5 mg -- Oral Held Dose Roldan Wilkes MD     01/03/2025 0900 EST metoprolol tartrate (LOPRESSOR) partial tablet 12.5 mg -- Oral Held Dose Roldan Wilkes MD     01/02/2025 2100 EST metoprolol tartrate (LOPRESSOR) partial tablet 12.5 mg -- Oral Held Dose Roldan Wilkes MD     01/02/2025 0900 EST metoprolol tartrate (LOPRESSOR) partial tablet 12.5 mg -- Oral Held Dose Roldan Wilkes MD     01/01/2025 2100 EST metoprolol tartrate (LOPRESSOR) partial tablet 12.5 mg -- Oral Held Dose Roldan Wilkes MD     01/01/2025 0900 EST metoprolol tartrate (LOPRESSOR) partial tablet 12.5 mg -- Oral Held Dose Roldan Wilkes MD     12/31/2024 2100 EST metoprolol tartrate (LOPRESSOR) partial tablet 12.5 mg -- Oral Held Dose Roldan Wilkes MD     12/31/2024 0900 EST metoprolol tartrate (LOPRESSOR) partial tablet 12.5 mg -- Oral Held Dose Roldan Wilkes MD     12/30/2024 2100 EST metoprolol tartrate (LOPRESSOR) partial tablet 12.5 mg -- Oral Held Dose Roldan Wilkes MD     12/30/2024 0900 EST metoprolol tartrate (LOPRESSOR) partial tablet 12.5 mg -- Oral Held Dose Roldan Wilkes MD     12/29/2024 2319 EST metoprolol tartrate (LOPRESSOR) partial tablet 12.5 mg -- Oral Held by provider Roldan Wilkes MD     12/29/2024 2140 EST metoprolol tartrate (LOPRESSOR) partial tablet 12.5 mg 12.5 mg Oral Not Given Orionrra Madina     12/29/2024 2317 EST calcium carbonate (TUMS) chewable tablet 500 mg -- Oral Held by provider Roldan Wilkes MD     12/30/2024 1256 EST pancrelipase (Lip-Prot-Amyl) (CREON) delayed release capsule 24,000 Units 24,000 Units Oral Not Given Albin Canas RN     12/30/2024 0730 EST pancrelipase (Lip-Prot-Amyl) (CREON) delayed release  capsule 24,000 Units 24,000 Units Oral Not Given Albin Canas RN     12/29/2024 1622 EST pancrelipase (Lip-Prot-Amyl) (CREON) delayed release capsule 24,000 Units 24,000 Units Oral Given Alcira Chan RN     01/04/2025 0900 EST polyethylene glycol (MIRALAX) packet 17 g -- Oral Held Dose (none)     01/03/2025 0900 EST polyethylene glycol (MIRALAX) packet 17 g -- Oral Held Dose (none)     01/02/2025 0900 EST polyethylene glycol (MIRALAX) packet 17 g -- Oral Held Dose (none)     01/01/2025 0900 EST polyethylene glycol (MIRALAX) packet 17 g -- Oral Held Dose Roldan Wilkes MD     12/31/2024 0900 EST polyethylene glycol (MIRALAX) packet 17 g -- Oral Held Dose Roldan Wilkes MD     12/30/2024 0900 EST polyethylene glycol (MIRALAX) packet 17 g -- Oral Held Dose Roldan Wilkes MD     12/29/2024 2317 EST polyethylene glycol (MIRALAX) packet 17 g -- Oral Held by provider Roldan Wilkes MD     12/29/2024 2317 EST simethicone (MYLICON) chewable tablet 80 mg -- Oral Held by provider Roldan Wilkes MD     12/29/2024 1627 EST simethicone (MYLICON) chewable tablet 80 mg 80 mg Oral Given Alcira Chan RN     12/29/2024 1621 EST insulin lispro (HumALOG/ADMELOG) 100 units/mL subcutaneous injection 1-5 Units -- Subcutaneous Not Given Alcira Chan RN     12/29/2024 1527 EST multi-electrolyte (ISOLYTE-S PH 7.4) bolus 500 mL 0 mL Intravenous Stopped Alcira Chan RN     12/30/2024 1256 EST multi-electrolyte (PLASMALYTE-A/ISOLYTE-S PH 7.4) IV solution 0 mL/hr Intravenous Stopped Albin Canas RN     12/29/2024 2202 EST multi-electrolyte (PLASMALYTE-A/ISOLYTE-S PH 7.4) IV solution 100 mL/hr Intravenous New Bag Dian Painter     12/29/2024 2242 EST insulin lispro (HumALOG/ADMELOG) 100 units/mL subcutaneous injection 1-5 Units -- Subcutaneous Not Given Dian Painter     12/29/2024 2223 EST lidocaine (URO-JET) 2 % urethral/mucosal gel -- Topical Given Dian Painter     12/30/2024 1037 EST metoprolol (LOPRESSOR) injection  2.5 mg 2.5 mg Intravenous Given Albin Canas RN     12/30/2024 0505 EST metoprolol (LOPRESSOR) injection 2.5 mg 2.5 mg Intravenous Given Dian Painter     12/29/2024 2344 EST metoprolol (LOPRESSOR) injection 2.5 mg 2.5 mg Intravenous Given Jose Cruza Madina     12/30/2024 1035 EST pantoprazole (PROTONIX) injection 40 mg 40 mg Intravenous Given Albin Canas RN     12/30/2024 1301 EST insulin lispro (HumALOG/ADMELOG) 100 units/mL subcutaneous injection 1-5 Units 1 Units Subcutaneous Given Albin Canas RN     12/30/2024 0649 EST insulin lispro (HumALOG/ADMELOG) 100 units/mL subcutaneous injection 1-5 Units -- Subcutaneous Not Given Jose Cruza Madina     12/30/2024 0010 EST insulin lispro (HumALOG/ADMELOG) 100 units/mL subcutaneous injection 1-5 Units -- Subcutaneous Not Given Dian Painter     12/30/2024 1258 EST multi-electrolyte (PLASMALYTE-A/ISOLYTE-S PH 7.4) IV solution 100 mL/hr Intravenous New Bag Albin Canas RN            PLEASE NOTE:  This encounter was completed utilizing the [employer] Direct Speech Voice Recognition Software. Grammatical errors, random word insertions, pronoun errors and incomplete sentences are occasional consequences of the system due to software limitations, ambient noise and hardware issues.These may be missed by proof reading prior to affixing electronic signature. Any questions or concerns about the content, text or information contained within the body of this dictation should be directly addressed to the physician for clarification. Please do not hesitate to call me directly if you have any any questions or concerns.

## 2024-12-31 LAB
ANION GAP SERPL CALCULATED.3IONS-SCNC: 10 MMOL/L (ref 4–13)
BASOPHILS # BLD AUTO: 0 THOUSANDS/ΜL (ref 0–0.1)
BASOPHILS NFR BLD AUTO: 0 % (ref 0–1)
BUN SERPL-MCNC: 52 MG/DL (ref 5–25)
CALCIUM SERPL-MCNC: 8.6 MG/DL (ref 8.4–10.2)
CHLORIDE SERPL-SCNC: 109 MMOL/L (ref 96–108)
CO2 SERPL-SCNC: 25 MMOL/L (ref 21–32)
CREAT SERPL-MCNC: 2.33 MG/DL (ref 0.6–1.3)
EOSINOPHIL # BLD AUTO: 0.09 THOUSAND/ΜL (ref 0–0.61)
EOSINOPHIL NFR BLD AUTO: 2 % (ref 0–6)
ERYTHROCYTE [DISTWIDTH] IN BLOOD BY AUTOMATED COUNT: 15.2 % (ref 11.6–15.1)
GFR SERPL CREATININE-BSD FRML MDRD: 25 ML/MIN/1.73SQ M
GLUCOSE SERPL-MCNC: 103 MG/DL (ref 65–140)
GLUCOSE SERPL-MCNC: 111 MG/DL (ref 65–140)
GLUCOSE SERPL-MCNC: 114 MG/DL (ref 65–140)
GLUCOSE SERPL-MCNC: 114 MG/DL (ref 65–140)
GLUCOSE SERPL-MCNC: 117 MG/DL (ref 65–140)
GLUCOSE SERPL-MCNC: 130 MG/DL (ref 65–140)
HCT VFR BLD AUTO: 26.2 % (ref 36.5–49.3)
HGB BLD-MCNC: 8.6 G/DL (ref 12–17)
IMM GRANULOCYTES # BLD AUTO: 0.01 THOUSAND/UL (ref 0–0.2)
IMM GRANULOCYTES NFR BLD AUTO: 0 % (ref 0–2)
LYMPHOCYTES # BLD AUTO: 0.57 THOUSANDS/ΜL (ref 0.6–4.47)
LYMPHOCYTES NFR BLD AUTO: 13 % (ref 14–44)
MAGNESIUM SERPL-MCNC: 2.1 MG/DL (ref 1.9–2.7)
MCH RBC QN AUTO: 31.9 PG (ref 26.8–34.3)
MCHC RBC AUTO-ENTMCNC: 32.8 G/DL (ref 31.4–37.4)
MCV RBC AUTO: 97 FL (ref 82–98)
MONOCYTES # BLD AUTO: 0.49 THOUSAND/ΜL (ref 0.17–1.22)
MONOCYTES NFR BLD AUTO: 11 % (ref 4–12)
NEUTROPHILS # BLD AUTO: 3.3 THOUSANDS/ΜL (ref 1.85–7.62)
NEUTS SEG NFR BLD AUTO: 74 % (ref 43–75)
NRBC BLD AUTO-RTO: 0 /100 WBCS
PHOSPHATE SERPL-MCNC: 2.7 MG/DL (ref 2.3–4.1)
PLATELET # BLD AUTO: 157 THOUSANDS/UL (ref 149–390)
PMV BLD AUTO: 11.1 FL (ref 8.9–12.7)
POTASSIUM SERPL-SCNC: 4 MMOL/L (ref 3.5–5.3)
RBC # BLD AUTO: 2.7 MILLION/UL (ref 3.88–5.62)
SODIUM SERPL-SCNC: 144 MMOL/L (ref 135–147)
WBC # BLD AUTO: 4.46 THOUSAND/UL (ref 4.31–10.16)

## 2024-12-31 PROCEDURE — 99024 POSTOP FOLLOW-UP VISIT: CPT | Performed by: SURGERY

## 2024-12-31 PROCEDURE — 99233 SBSQ HOSP IP/OBS HIGH 50: CPT | Performed by: INTERNAL MEDICINE

## 2024-12-31 PROCEDURE — 85025 COMPLETE CBC W/AUTO DIFF WBC: CPT | Performed by: SURGERY

## 2024-12-31 PROCEDURE — 97110 THERAPEUTIC EXERCISES: CPT

## 2024-12-31 PROCEDURE — 82948 REAGENT STRIP/BLOOD GLUCOSE: CPT

## 2024-12-31 PROCEDURE — 99232 SBSQ HOSP IP/OBS MODERATE 35: CPT | Performed by: INTERNAL MEDICINE

## 2024-12-31 PROCEDURE — 83735 ASSAY OF MAGNESIUM: CPT | Performed by: SURGERY

## 2024-12-31 PROCEDURE — 97530 THERAPEUTIC ACTIVITIES: CPT

## 2024-12-31 PROCEDURE — 80048 BASIC METABOLIC PNL TOTAL CA: CPT | Performed by: SURGERY

## 2024-12-31 PROCEDURE — 84100 ASSAY OF PHOSPHORUS: CPT | Performed by: SURGERY

## 2024-12-31 RX ORDER — HYDRALAZINE HYDROCHLORIDE 20 MG/ML
20 INJECTION INTRAMUSCULAR; INTRAVENOUS EVERY 6 HOURS SCHEDULED
Status: DISCONTINUED | OUTPATIENT
Start: 2024-12-31 | End: 2024-12-31

## 2024-12-31 RX ORDER — SODIUM CHLORIDE 450 MG/100ML
100 INJECTION, SOLUTION INTRAVENOUS CONTINUOUS
Status: DISPENSED | OUTPATIENT
Start: 2024-12-31 | End: 2024-12-31

## 2024-12-31 RX ORDER — HYDRALAZINE HYDROCHLORIDE 25 MG/1
25 TABLET, FILM COATED ORAL 2 TIMES DAILY
Status: DISCONTINUED | OUTPATIENT
Start: 2024-12-31 | End: 2025-01-02

## 2024-12-31 RX ADMIN — ACETAMINOPHEN 975 MG: 325 TABLET, FILM COATED ORAL at 05:36

## 2024-12-31 RX ADMIN — LABETALOL HYDROCHLORIDE 10 MG: 5 INJECTION, SOLUTION INTRAVENOUS at 03:46

## 2024-12-31 RX ADMIN — PANTOPRAZOLE SODIUM 40 MG: 40 INJECTION, POWDER, FOR SOLUTION INTRAVENOUS at 08:14

## 2024-12-31 RX ADMIN — HEPARIN SODIUM 5000 UNITS: 5000 INJECTION, SOLUTION INTRAVENOUS; SUBCUTANEOUS at 05:36

## 2024-12-31 RX ADMIN — Medication 12.5 MG: at 21:20

## 2024-12-31 RX ADMIN — HEPARIN SODIUM 5000 UNITS: 5000 INJECTION, SOLUTION INTRAVENOUS; SUBCUTANEOUS at 21:19

## 2024-12-31 RX ADMIN — LABETALOL HYDROCHLORIDE 10 MG: 5 INJECTION, SOLUTION INTRAVENOUS at 08:27

## 2024-12-31 RX ADMIN — SODIUM CHLORIDE 100 ML/HR: 0.45 INJECTION, SOLUTION INTRAVENOUS at 11:57

## 2024-12-31 RX ADMIN — HYDRALAZINE HYDROCHLORIDE 25 MG: 25 TABLET ORAL at 21:20

## 2024-12-31 RX ADMIN — NICOTINE 1 PATCH: 14 PATCH, EXTENDED RELEASE TRANSDERMAL at 08:14

## 2024-12-31 RX ADMIN — HEPARIN SODIUM 5000 UNITS: 5000 INJECTION, SOLUTION INTRAVENOUS; SUBCUTANEOUS at 13:53

## 2024-12-31 RX ADMIN — METOROPROLOL TARTRATE 2.5 MG: 5 INJECTION, SOLUTION INTRAVENOUS at 05:36

## 2024-12-31 RX ADMIN — HYDRALAZINE HYDROCHLORIDE 25 MG: 25 TABLET ORAL at 12:00

## 2024-12-31 RX ADMIN — HYDRALAZINE HYDROCHLORIDE 5 MG: 20 INJECTION INTRAMUSCULAR; INTRAVENOUS at 04:47

## 2024-12-31 RX ADMIN — ACETAMINOPHEN 975 MG: 325 TABLET, FILM COATED ORAL at 21:19

## 2024-12-31 NOTE — PLAN OF CARE
Problem: GASTROINTESTINAL - ADULT  Goal: Minimal or absence of nausea and/or vomiting  Description: INTERVENTIONS:  - Administer IV fluids if ordered to ensure adequate hydration  - Maintain NPO status until nausea and vomiting are resolved  - Nasogastric tube if ordered  - Administer ordered antiemetic medications as needed  - Provide nonpharmacologic comfort measures as appropriate  - Advance diet as tolerated, if ordered  - Consider nutrition services referral to assist patient with adequate nutrition and appropriate food choices  Outcome: Progressing     Problem: CARDIOVASCULAR - ADULT  Goal: Maintains optimal cardiac output and hemodynamic stability  Description: INTERVENTIONS:  - Monitor I/O, vital signs and rhythm  - Monitor for S/S and trends of decreased cardiac output  - Administer and titrate ordered vasoactive medications to optimize hemodynamic stability  - Assess quality of pulses, skin color and temperature  - Assess for signs of decreased coronary artery perfusion  - Instruct patient to report change in severity of symptoms  Outcome: Progressing

## 2024-12-31 NOTE — ASSESSMENT & PLAN NOTE
On Norvasc 10 mg p.o. daily, hydralazine 25 mg p.o. twice daily, Metroprolol 12.5 mg p.o. every 12 unable to get due to NGT  Placed on hydralazine 22 mg IV Q6 while remains n.p.o.

## 2024-12-31 NOTE — ASSESSMENT & PLAN NOTE
SHOAIB most likely secondary to FELIPE 12/24+ ischemic injury secondary to hemodynamic perturbation intraoperatively in light of underlying comorbidities plus failure to autoregulate) lisinopril with subsequent ATN  After review of records it appears that the patient has a baseline Creatinine of 1.8-2.2 mg/dL.  Admission creatinine of 2.08 mg/dL on 12/24/2024.  Creatinine today is at 1.99 mg/dL stable and back at baseline  Hold off on further IV fluids for now  Clinically euvolemic hold off on diuretics for now  check BMP, magnesium, phosphorus in a.m.  Place on a renal diet when allowed diet order.   Strict I/O.  Daily weights  Urinary retention protocol  Avoid nephrotoxins, adjust meds to appropriate GFR.  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 Era Wood for discharge from renal standpoint medically cleared

## 2024-12-31 NOTE — ASSESSMENT & PLAN NOTE
78 yo male now s/p 12/24 Ex lap, ADA, mesenteric agram, retrograde open mesenteric stent    Hypertensive to the 180's systolic and oxygen has been weaned to 5L of NC  UOP 1400  NG with gastric output minimal    WBC 4.4 from 3.9  Hgb 8.6 from 8.0  Cr 2.33 from 2.46    Plan  Discontinue NGT  Start sips of clears  Continue IV fluids  PRN pain meds and anti nausea meds  DVT prophylaxis  Restart PO blood pressure and cardiac meds  Discontinue rubi  Appreciate vascular recs  Appreciate cardiology recs  Appreciate nephro recs  Consider when to restart Plavix

## 2024-12-31 NOTE — PLAN OF CARE
Problem: OCCUPATIONAL THERAPY ADULT  Goal: Performs self-care activities at highest level of function for planned discharge setting.  See evaluation for individualized goals.  Description: Treatment Interventions: ADL retraining, Functional transfer training, Endurance training, Patient/family training, Equipment evaluation/education, Compensatory technique education, Energy conservation, Activityengagement          See flowsheet documentation for full assessment, interventions and recommendations.   Outcome: Progressing  Note: Limitation: Decreased ADL status, Decreased endurance, Decreased self-care trans, Decreased high-level ADLs  Prognosis: Good  Assessment: Pt seen on this date for OT session focusing on ADL retraining, cognitive reorientation, body mechanics, transfer retraining, increasing activity tolerance/endurance and EOB sitting to increase ability to participate in ADL/functional tasks. Pt was found in chair and was left in chair w/ all needs within reach, chair alarm on. Pt completed transfers and  fm w min ax1 c rw. Performed there-ex: rows and shoulder flexion x12.  Pt w/ improvements in transfer ability, activity tolerance, however is still limited 2* decreased ADL/High-level ADL status, decreased activity tolerance/endurance, decreased cognition, decreased self-care trans, decreased safety awareness and insight to condition.   The patient's raw score on the AM-PAC Daily Activity Inpatient Short Form is 20. A raw score of greater than or equal to 19 suggests the patient may benefit from discharge to home. Please refer to the recommendation of the Occupational Therapist for safe discharge planning.  Recommending pt D/C to level  3 when medically stable. Pt will continue to benefit from acute OT services to meet goals.     Rehab Resource Intensity Level, OT: III (Minimum Resource Intensity)

## 2024-12-31 NOTE — ASSESSMENT & PLAN NOTE
Lab Results   Component Value Date    EGFR 30 01/01/2025    EGFR 25 12/31/2024    EGFR 23 12/30/2024    CREATININE 1.99 (H) 01/01/2025    CREATININE 2.33 (H) 12/31/2024    CREATININE 2.46 (H) 12/30/2024   Review of record shows baseline creatinine 1.8 to 2.2 mg/dL   As an outpatient follows up with Dr. 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    Will need to send office message to arrange for follow-up and lab work upon discharge

## 2024-12-31 NOTE — ASSESSMENT & PLAN NOTE
Lab Results   Component Value Date    EGFR 25 12/31/2024    EGFR 23 12/30/2024    EGFR 20 12/29/2024    CREATININE 2.33 (H) 12/31/2024    CREATININE 2.46 (H) 12/30/2024    CREATININE 2.78 (H) 12/29/2024

## 2024-12-31 NOTE — ASSESSMENT & PLAN NOTE
On Norvasc 10 mg p.o. daily, hydralazine 25 mg p.o. twice daily, Metroprolol 12.5 mg p.o. every 12   Blood pressure better controlled now back on p.o. meds

## 2024-12-31 NOTE — PROGRESS NOTES
Cardiology Progress Note.   Unit/Bed#: [facility] 530-01 Encounter: [id number]        Jay Roach Jr. 79 y.o. male [id number]  Hospital Stay Days: 7    Assessment and Plan      Current Problem List   Principal Problem:    Mesenteric ischemia ([facility])  Active Problems:    S/P CABG (coronary artery bypass graft)    Coronary artery disease involving native coronary artery of native heart without angina pectoris    Sinus bradycardia    CKD (chronic kidney disease) stage 4, GFR 15-29 ml/min ([facility])    Chronic diastolic CHF (congestive heart failure) ([facility])    S/P TAVR (transcatheter aortic valve replacement)    SHOAIB (acute kidney injury) ([facility])    Assessment/Plan:    This is 79-year-old male presenting with abdominal pain and was found to have mesenteric ischemia.  Cardiology was consulted for preop risk assessment.  Patient is now postop ex lap and SMA stenting.      # Mesenteric ischemia on presentation status post ex lap and stenting  # SHOAIB on CKD4  # History of CAD status post CABG: LIMA to LAD and SVG to OM patent   # PCI to RCA in  pre-TAVR  # Hypertension  # Bilateral carotid disease  # Dyslipidemia  # PAD status post bilateral revascularization  # AS s/p TAVR in : 29MM IRVIN KYLER S3 ULTRA VALVE   # CKD stage 4  # History of GI bleed status post clipping: Aspirin stopped.    LVEF 65%.    Plan:    Doing well postoperatively.  SHOAIB noted.  Likely prerenal.  S/p IV fluids.  Encourage p.o. intake when able.  Nephrology consulted.    C/w Plavix and Lipitor for history of CAD/CABG.  Restart home cardiac medications once able to take p.o.  Rest of the care per primary team.  Follow-up with cardiology in 4 weeks.  Will sign off. Call with any questions.         Subjective     Was seen and examined today.  No major overnight events.  Objective     Vitals: Temp (24hrs), Av.4 °F (36.9 °C), Min:97.9 °F (36.6 °C), Max:99.1 °F (37.3 °C)  Current: Temperature: 98.3 °F (36.8 °C)  Patient Vitals for the past 24  hrs:   BP Temp Temp src Pulse Resp SpO2 Weight   12/31/24 0825 (!) 174/55 -- -- 75 -- 97 % --   12/31/24 0819 -- -- -- -- -- 98 % --   12/31/24 0722 (!) 180/57 98.3 °F (36.8 °C) Oral 70 17 97 % --   12/31/24 0600 -- -- -- -- -- -- 83.7 kg (184 lb 8.4 oz)   12/31/24 0529 164/51 -- -- 79 -- 95 % --   12/31/24 0441 (!) 179/54 -- -- 65 -- (!) 89 % --   12/31/24 0344 (!) 182/60 -- -- 75 -- 98 % --   12/31/24 0320 -- 98.5 °F (36.9 °C) -- -- -- -- --   12/30/24 2257 170/51 97.9 °F (36.6 °C) -- 72 -- 96 % --   12/30/24 2030 -- -- -- -- -- 94 % --   12/30/24 1750 (!) 176/53 -- -- 55 -- -- --   12/30/24 1511 (!) 161/49 99.1 °F (37.3 °C) Oral 68 17 97 % --   12/30/24 1047 157/50 98.2 °F (36.8 °C) Oral 68 18 93 % --    Body mass index is 26.48 kg/m².  Physical Exam:  Physical Exam  Vitals reviewed.   Constitutional:       General: He is not in acute distress.     Appearance: He is well-developed. He is not diaphoretic.   Cardiovascular:      Rate and Rhythm: Normal rate and regular rhythm.      Heart sounds: Normal heart sounds. No murmur heard.     No friction rub.   Pulmonary:      Effort: Pulmonary effort is normal. No respiratory distress.      Breath sounds: Normal breath sounds. No stridor. No wheezing.   Psychiatric:         Mood and Affect: Mood normal.         Thought Content: Thought content normal.         Judgment: Judgment normal.         Invasive Devices       Peripheral Intravenous Line  Duration             Peripheral IV 12/31/24 Proximal;Right;Ventral (anterior) Forearm <1 day              Drain  Duration             NG/OG/Enteral Tube Nasogastric Left nare 1 day    Urethral Catheter Latex 16 Fr. 1 day                        Labs:   Results from last 7 days   Lab Units 12/31/24  0049 12/30/24  1426 12/30/24  0328 12/28/24  1222 12/26/24  0558 12/25/24  0848 12/25/24  0323 12/25/24  0323 12/24/24  2103 12/24/24  1636 12/24/24  1527 12/24/24  1408   WBC Thousand/uL 4.46  --  3.99* 7.42 10.12  --   --  12.58*  --   16.68*  --   --    HEMOGLOBIN g/dL 8.6* 8.0* 7.5* 8.9* 8.3* 8.3*  --  8.4* 9.1* 9.2*  --   --    I STAT HEMOGLOBIN g/dl  --   --   --   --   --   --   --   --   --   --  8.5* 9.2*   HEMATOCRIT % 26.2* 24.4* 23.4* 28.1* 26.1*  --   --  25.5*  --  28.1*  --   --    HEMATOCRIT, ISTAT %  --   --   --   --   --   --   --   --   --   --  25* 27*   PLATELETS Thousands/uL 157  --  135* 155 131*  --   --  130*  --  147*  --   --    SEGS PCT % 74  --  71 85* 89*  --    < > 88*  --   --   --   --    MONO PCT % 11  --  11 5 4  --   --  6  --   --   --   --    EOS PCT % 2  --  3 1 0  --   --  0  --   --   --   --     < > = values in this interval not displayed.      Results from last 7 days   Lab Units 12/31/24  0049 12/30/24  0328 12/29/24  0338 12/28/24  1222 12/28/24  0302 12/27/24  1431 12/26/24  0558 12/25/24  0342 12/24/24  1636 12/24/24  1527 12/24/24  1408   SODIUM mmol/L 144 141 138 138 140 140 145 142 142  --   --    POTASSIUM mmol/L 4.0 3.7 3.5 3.7 3.8 3.7 3.9 4.3 3.7  --   --    CHLORIDE mmol/L 109* 109* 106 106 107 109* 112* 112* 113*  --   --    CO2 mmol/L 25 24 24 25 23 24 25 24 21  --   --    CO2, I-STAT mmol/L  --   --   --   --   --   --   --   --   --  24 24   BUN mg/dL 52* 55* 54* 48* 46* 43* 38* 35* 36*  --   --    CREATININE mg/dL 2.33* 2.46* 2.78* 2.59* 2.52* 2.40* 1.85* 1.84* 1.75*  --   --    CALCIUM mg/dL 8.6 8.3* 8.1* 8.3* 8.2* 8.2* 8.5 8.4 7.7*  --   --    ALK PHOS U/L  --   --   --   --   --   --   --   --  56  --   --    ALT U/L  --   --   --   --   --   --   --   --  5*  --   --    AST U/L  --   --   --   --   --   --   --   --  9*  --   --    GLUCOSE, ISTAT mg/dl  --   --   --   --   --   --   --   --   --  123 111   MAGNESIUM mg/dL 2.1 2.0 2.0  --   --   --  2.3 2.1 1.4*  --   --    PHOSPHORUS mg/dL 2.7 2.3 3.2  --   --   --  3.1 3.6 3.3  --   --    INR   --   --   --   --   --   --   --   --  1.29*  --   --    PTT seconds  --   --   --   --   --   --   --   --  132*  --   --    EGFR  "ml/min/1.73sq m 25 23 20 22 22 24 33 33 35  --   --      Results from last 7 days   Lab Units 12/24/24  1636   INR  1.29*   PTT seconds 132*     Results from last 7 days   Lab Units 12/25/24  0329   LACTIC ACID mmol/L 0.9         No results found for: \"PHART\", \"KYJ4EEI\", \"PO2ART\", \"MBD1DSG\", \"J6HJSTNA\", \"BEART\", \"SOURCE\"  No components found for: \"HIV1X2\"  Lab Results   Component Value Date    HEPCAB Non-reactive 09/24/2021     No results found for: \"SPEP\", \"UPEP\"   Lab Results   Component Value Date    HGBA1C 6.6 (H) 12/24/2024    HGBA1C 7.3 (H) 09/04/2024    HGBA1C 6.6 (H) 03/05/2024     No results found for: \"CHOL\"   Lab Results   Component Value Date    HDL 52 05/05/2022    HDL 31 (L) 06/30/2021    HDL 39 (L) 09/15/2020      Lab Results   Component Value Date    LDLCALC 33 05/05/2022    LDLCALC 36 06/30/2021    LDLCALC 36 09/15/2020      Lab Results   Component Value Date    TRIG 50 05/05/2022    TRIG 90 06/30/2021    TRIG 85 09/15/2020     No components found for: \"PROCAL\"      Micro:      Urinalysis:  No results found for: \"AMPHETUR\", \"BDZUR\", \"COCAINEUR\", \"OPIATEUR\", \"PCPUR\", \"THCUR\", \"ETOH\", \"ACTMNPHEN\", \"SALICYLATE\"         Invalid input(s): \"URIBILINOGEN\"            Intake and Outputs:  I/O         12/28 0701 12/29 0700 12/29 0701 12/30 0700 12/30 0701 12/31 0700    P.O. 1380 600     I.V. (mL/kg)  1195 (14.2)     IV Piggyback  500     Total Intake(mL/kg) 1380 (17.5) 2295 (27.3)     Urine (mL/kg/hr) 695 (0.4) 650 (0.3)     Emesis/NG output  50     Stool  0     Total Output 695 700     Net +685 +1595            Unmeasured Urine Occurrence 1 x 1 x     Unmeasured Stool Occurrence  1 x           Nutrition:  Diet NPO; Sips of clear liquids  Radiology Results:   XR chest portable   Final Result by Antoni Lima MD (12/30 0915)      Enteric tube tip projects over the left upper quadrant.            Workstation performed: [id number]         XR abdomen 1 vw portable   Final Result by Antoni " Rohan Lima MD (12/30 0904)      ET tube courses inferior to the diaphragm with tip projecting over the left upper quadrant..               Workstation performed: [id number]         XR abdomen 1 view kub   Final Result by Jaqui Max MD (12/30 1246)      Small bowel gaseous dilation may be postoperative ileus.         Workstation performed: [id number]         XR chest portable   Final Result by Antoni Lima MD (12/29 1136)      No acute cardiopulmonary disease.      Small pleural effusions.      Workstation performed: [id number]         IR mesenteric/visceral angiogram   Final Result by SYSTEMGENERATED, DOCUMENTATION (12/24 5988)        Scheduled Medications:  acetaminophen, 975 mg, Q8H TRENT  [Held by provider] amLODIPine, 10 mg, Daily  [Held by provider] atorvastatin, 80 mg, HS  [Held by provider] clopidogrel, 75 mg, Daily  heparin (porcine), 5,000 Units, Q8H TRENT  [Held by provider] hydrALAZINE, 25 mg, BID  insulin lispro, 1-5 Units, Q6H  metoprolol, 2.5 mg, Q6H  [Held by provider] metoprolol tartrate, 12.5 mg, Q12H TRENT  nicotine, 1 patch, Daily  pancrelipase (Lip-Prot-Amyl), 24,000 Units, TID With Meals  [Held by provider] pantoprazole, 40 mg, Early Morning  pantoprazole, 40 mg, Q24H TRENT  [Held by provider] polyethylene glycol, 17 g, Daily  [Held by provider] senna-docusate sodium, 1 tablet, HS      PRN MEDS:  [Held by provider] calcium carbonate, 500 mg, Daily PRN  hydrALAZINE, 5 mg, Q4H PRN  HYDROmorphone, 0.5 mg, Q3H PRN  HYDROmorphone, 0.2 mg, Q2H PRN  labetalol, 10 mg, Q3H PRN  metoclopramide, 10 mg, Q6H PRN  ondansetron, 4 mg, Q4H PRN  oxyCODONE, 2.5 mg, Q4H PRN   Or  oxyCODONE, 5 mg, Q4H PRN  [Held by provider] simethicone, 80 mg, Q6H PRN      Last 24 Hour Meds: :   Medication Administration - last 24 hours from 12/30/2024 0928 to 12/31/2024 0928         Date/Time Order Dose Route Action Action by     12/31/2024 0814 EST nicotine (NICODERM CQ) 14 mg/24hr TD 24 hr patch 1 patch 1  patch Transdermal Medication Applied [facility]     12/31/2024 0809 EST nicotine (NICODERM CQ) 14 mg/24hr TD 24 hr patch 1 patch 1 patch Transdermal Patch Removed [facility]     12/30/2024 1035 EST nicotine (NICODERM CQ) 14 mg/24hr TD 24 hr patch 1 patch 1 patch Transdermal Medication Applied Albin Canas RN     12/30/2024 1034 EST nicotine (NICODERM CQ) 14 mg/24hr TD 24 hr patch 1 patch 1 patch Transdermal Patch Removed Albin Canas RN     12/31/2024 0536 EST heparin (porcine) subcutaneous injection 5,000 Units 5,000 Units Subcutaneous Given Lilia Brown RN     12/30/2024 2118 EST heparin (porcine) subcutaneous injection 5,000 Units 5,000 Units Subcutaneous Given Lilia Brown RN     12/30/2024 1303 EST heparin (porcine) subcutaneous injection 5,000 Units 5,000 Units Subcutaneous Given Albin Canas RN     12/31/2024 0536 EST acetaminophen (TYLENOL) tablet 975 mg 975 mg Oral Given Lilia Brown RN     12/30/2024 2118 EST acetaminophen (TYLENOL) tablet 975 mg 975 mg Oral Given Lilia Brown RN     12/30/2024 1302 EST acetaminophen (TYLENOL) tablet 975 mg 975 mg Oral Not Given Albin Canas RN     01/04/2025 0900 EST clopidogrel (PLAVIX) tablet 75 mg -- Oral Held Dose (none)     01/03/2025 0900 EST clopidogrel (PLAVIX) tablet 75 mg -- Oral Held Dose (none)     01/02/2025 0900 EST clopidogrel (PLAVIX) tablet 75 mg -- Oral Held Dose (none)     01/01/2025 0900 EST clopidogrel (PLAVIX) tablet 75 mg -- Oral Held Dose Roldan Wilkes MD     12/31/2024 0900 EST clopidogrel (PLAVIX) tablet 75 mg 75 mg Oral Not Given [facility]     01/04/2025 0600 EST pantoprazole (PROTONIX) EC tablet 40 mg -- Oral Held Dose (none)     01/03/2025 0600 EST pantoprazole (PROTONIX) EC tablet 40 mg -- Oral Held Dose (none)     01/02/2025 0600 EST pantoprazole (PROTONIX) EC tablet 40 mg -- Oral Held Dose (none)     01/01/2025 0600 EST pantoprazole (PROTONIX) EC tablet 40 mg -- Oral Held Dose Roldan Wilkes MD     12/31/2024 0600  EST pantoprazole (PROTONIX) EC tablet 40 mg 40 mg Oral Not Given [facility] Sell     01/04/2025 0900 EST amLODIPine (NORVASC) tablet 10 mg -- Oral Held Dose (none)     01/03/2025 0900 EST amLODIPine (NORVASC) tablet 10 mg -- Oral Held Dose (none)     01/02/2025 0900 EST amLODIPine (NORVASC) tablet 10 mg -- Oral Held Dose (none)     01/01/2025 0900 EST amLODIPine (NORVASC) tablet 10 mg -- Oral Held Dose Roldan Wilkes MD     12/31/2024 0900 EST amLODIPine (NORVASC) tablet 10 mg 10 mg Oral Not Given Shalom Sell     01/04/2025 2200 EST atorvastatin (LIPITOR) tablet 80 mg -- Oral Held Dose (none)     01/03/2025 2200 EST atorvastatin (LIPITOR) tablet 80 mg -- Oral Held Dose (none)     01/02/2025 2200 EST atorvastatin (LIPITOR) tablet 80 mg -- Oral Held Dose (none)     01/01/2025 2200 EST atorvastatin (LIPITOR) tablet 80 mg -- Oral Held Dose Roldan Wilkes MD     12/31/2024 2200 EST atorvastatin (LIPITOR) tablet 80 mg -- Oral Held Dose Roldan Wilkes MD     12/30/2024 2200 EST atorvastatin (LIPITOR) tablet 80 mg -- Oral Held Dose Roldan Wilkes MD     01/04/2025 2100 EST hydrALAZINE (APRESOLINE) tablet 25 mg -- Oral Held Dose (none)     01/04/2025 0900 EST hydrALAZINE (APRESOLINE) tablet 25 mg -- Oral Held Dose (none)     01/03/2025 2100 EST hydrALAZINE (APRESOLINE) tablet 25 mg -- Oral Held Dose (none)     01/03/2025 0900 EST hydrALAZINE (APRESOLINE) tablet 25 mg -- Oral Held Dose (none)     01/02/2025 2100 EST hydrALAZINE (APRESOLINE) tablet 25 mg -- Oral Held Dose (none)     01/02/2025 0900 EST hydrALAZINE (APRESOLINE) tablet 25 mg -- Oral Held Dose (none)     01/01/2025 2100 EST hydrALAZINE (APRESOLINE) tablet 25 mg -- Oral Held Dose Roldan Wilkes MD     01/01/2025 0900 EST hydrALAZINE (APRESOLINE) tablet 25 mg -- Oral Held Dose Roldan Wilkes MD     12/31/2024 2100 EST hydrALAZINE (APRESOLINE) tablet 25 mg -- Oral Held Dose Roldan Wilkes MD     12/31/2024 0900 EST hydrALAZINE (APRESOLINE) tablet 25 mg -- Oral Not Given [facility] Quiñones      12/30/2024 2100 EST hydrALAZINE (APRESOLINE) tablet 25 mg -- Oral Held Dose Roldan Wilkes MD     12/31/2024 0827 EST labetalol (NORMODYNE) injection 10 mg 10 mg Intravenous Given [facility]om Quiñones     12/31/2024 0346 EST labetalol (NORMODYNE) injection 10 mg 10 mg Intravenous Given Lilia Brown RN     01/05/2025 2200 EST senna-docusate sodium (SENOKOT S) 8.6-50 mg per tablet 1 tablet -- Oral Held Dose (none)     01/04/2025 2200 EST senna-docusate sodium (SENOKOT S) 8.6-50 mg per tablet 1 tablet -- Oral Held Dose (none)     01/03/2025 2200 EST senna-docusate sodium (SENOKOT S) 8.6-50 mg per tablet 1 tablet -- Oral Held Dose (none)     01/02/2025 2200 EST senna-docusate sodium (SENOKOT S) 8.6-50 mg per tablet 1 tablet -- Oral Held Dose Roldan Wilkes MD     01/01/2025 2200 EST senna-docusate sodium (SENOKOT S) 8.6-50 mg per tablet 1 tablet -- Oral Held Dose Roldan Wilkes MD     12/31/2024 2200 EST senna-docusate sodium (SENOKOT S) 8.6-50 mg per tablet 1 tablet -- Oral Held Dose Roldan Wilkes MD     12/30/2024 2200 EST senna-docusate sodium (SENOKOT S) 8.6-50 mg per tablet 1 tablet -- Oral Held Dose Roldan Wilkes MD     01/03/2025 2100 EST metoprolol tartrate (LOPRESSOR) partial tablet 12.5 mg -- Oral Held Dose Roldan Wilkes MD     01/03/2025 0900 EST metoprolol tartrate (LOPRESSOR) partial tablet 12.5 mg -- Oral Held Dose Roldan Wilkes MD     01/02/2025 2100 EST metoprolol tartrate (LOPRESSOR) partial tablet 12.5 mg -- Oral Held Dose Roldan Wilkes MD     01/02/2025 0900 EST metoprolol tartrate (LOPRESSOR) partial tablet 12.5 mg -- Oral Held Dose Roldan Wilkes MD     01/01/2025 2100 EST metoprolol tartrate (LOPRESSOR) partial tablet 12.5 mg -- Oral Held Dose Roldan Wilkes MD     01/01/2025 0900 EST metoprolol tartrate (LOPRESSOR) partial tablet 12.5 mg -- Oral Held Dose Roldan Wilkes MD     12/31/2024 2100 EST metoprolol tartrate (LOPRESSOR) partial tablet 12.5 mg -- Oral Held Dose Roldan Wilkes MD     12/31/2024 0900 EST  metoprolol tartrate (LOPRESSOR) partial tablet 12.5 mg 12.5 mg Oral Not Given [facility]     12/30/2024 2100 EST metoprolol tartrate (LOPRESSOR) partial tablet 12.5 mg -- Oral Held Dose Roldan Wilkes MD     12/31/2024 0718 EST pancrelipase (Lip-Prot-Amyl) (CREON) delayed release capsule 24,000 Units 24,000 Units Oral Not Given [facility]     12/30/2024 1553 EST pancrelipase (Lip-Prot-Amyl) (CREON) delayed release capsule 24,000 Units 24,000 Units Oral Not Given Albin Canas RN     12/30/2024 1256 EST pancrelipase (Lip-Prot-Amyl) (CREON) delayed release capsule 24,000 Units 24,000 Units Oral Not Given Albin Canas RN     01/04/2025 0900 EST polyethylene glycol (MIRALAX) packet 17 g -- Oral Held Dose (none)     01/03/2025 0900 EST polyethylene glycol (MIRALAX) packet 17 g -- Oral Held Dose (none)     01/02/2025 0900 EST polyethylene glycol (MIRALAX) packet 17 g -- Oral Held Dose (none)     01/01/2025 0900 EST polyethylene glycol (MIRALAX) packet 17 g -- Oral Held Dose Roldan Wilkes MD     12/31/2024 0900 EST polyethylene glycol (MIRALAX) packet 17 g 17 g Oral Not Given [facility]     12/30/2024 1256 EST multi-electrolyte (PLASMALYTE-A/ISOLYTE-S PH 7.4) IV solution 0 mL/hr Intravenous Stopped Albin Canas RN     12/31/2024 0536 EST metoprolol (LOPRESSOR) injection 2.5 mg 2.5 mg Intravenous Given Lilia Brown RN     12/30/2024 2320 EST metoprolol (LOPRESSOR) injection 2.5 mg 2.5 mg Intravenous Given Lilia Brown RN     12/30/2024 1750 EST metoprolol (LOPRESSOR) injection 2.5 mg 2.5 mg Intravenous Given Rebecca Jefferson RN     12/30/2024 1037 EST metoprolol (LOPRESSOR) injection 2.5 mg 2.5 mg Intravenous Given Albin Canas RN     12/31/2024 0814 EST pantoprazole (PROTONIX) injection 40 mg 40 mg Intravenous Given [facility] Nuria     12/30/2024 1035 EST pantoprazole (PROTONIX) injection 40 mg 40 mg Intravenous Given Albin Canas RN     12/31/2024 0441 EST hydrALAZINE (APRESOLINE) injection 5 mg 5 mg Intravenous  Given Lilia Brown RN     12/31/2024 0621 EST insulin lispro (HumALOG/ADMELOG) 100 units/mL subcutaneous injection 1-5 Units -- Subcutaneous Not Given Lilia Brown RN     12/31/2024 0102 EST insulin lispro (HumALOG/ADMELOG) 100 units/mL subcutaneous injection 1-5 Units -- Subcutaneous Not Given Lilia Brown RN     12/30/2024 1756 EST insulin lispro (HumALOG/ADMELOG) 100 units/mL subcutaneous injection 1-5 Units -- Subcutaneous Not Given Rebecca Jefferson RN     12/30/2024 1301 EST insulin lispro (HumALOG/ADMELOG) 100 units/mL subcutaneous injection 1-5 Units 1 Units Subcutaneous Given Alibn Canas RN     12/30/2024 2254 EST multi-electrolyte (PLASMALYTE-A/ISOLYTE-S PH 7.4) IV solution 0 mL/hr Intravenous Stopped Lilia Brown RN     12/30/2024 1258 EST multi-electrolyte (PLASMALYTE-A/ISOLYTE-S PH 7.4) IV solution 100 mL/hr Intravenous New Bag Albin Canas RN            PLEASE NOTE:  This encounter was completed utilizing the [employer] Direct Speech Voice Recognition Software. Grammatical errors, random word insertions, pronoun errors and incomplete sentences are occasional consequences of the system due to software limitations, ambient noise and hardware issues.These may be missed by proof reading prior to affixing electronic signature. Any questions or concerns about the content, text or information contained within the body of this dictation should be directly addressed to the physician for clarification. Please do not hesitate to call me directly if you have any any questions or concerns.

## 2024-12-31 NOTE — PROGRESS NOTES
Progress Note - Nephrology   Name: Jay Roach Jr. 79 y.o. male I MRN: [id number]  [facility] Date of Admission: 12/24/2024   Date of Service: 1/1/2025 I Hospital Day: 8    79-year-old male with multiple comorbidities including CKD stage IV, peripheral arterial disease status post bilateral revascularization, TAVR, CABG, hypertension and bilateral carotid stenosis admitted with abdominal pain CTA suspicious for pneumatosis and mesenteric ischemia taken to the OR for expiratory lap with retrograde open SMA stent. Nephrology consulted for evaluation and management of acute kidney injury.   Assessment & Plan  SHOAIB (acute kidney injury) (HCC)  SHOAIB most likely secondary to FELIPE 12/24+ ischemic injury secondary to hemodynamic perturbation intraoperatively in light of underlying comorbidities plus failure to autoregulate) lisinopril with subsequent ATN  After review of records it appears that the patient has a baseline Creatinine of 1.8-2.2 mg/dL.  Admission creatinine of 2.08 mg/dL on 12/24/2024.  Creatinine today is at 1.99 mg/dL stable and back at baseline  Hold off on further IV fluids for now  Clinically euvolemic hold off on diuretics for now  check BMP, magnesium, phosphorus in a.m.  Place on a renal diet when allowed diet order.   Strict I/O.  Daily weights  Urinary retention protocol  Avoid nephrotoxins, adjust meds to appropriate GFR.  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 Era Wood for discharge from renal standpoint medically cleared  CKD (chronic kidney disease) stage 4, GFR 15-29 ml/min ([facility])  Lab Results   Component Value Date    EGFR 30 01/01/2025    EGFR 25 12/31/2024    EGFR 23 12/30/2024    CREATININE 1.99 (H) 01/01/2025    CREATININE 2.33 (H) 12/31/2024    CREATININE 2.46 (H) 12/30/2024   Review of record shows baseline creatinine 1.8 to 2.2 mg/dL   As an outpatient  follows up with Dr. 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    Will need to send office message to arrange for follow-up and lab work upon discharge  Mesenteric ischemia (HCC)  Status post expiratory laparotomy, lysis of adhesions mesenteric angiogram with SMA stenting on 12/24   Follow-up with vascular surgery and general surgery   Chronic diastolic CHF (congestive heart failure) (HCC)  Wt Readings from Last 3 Encounters:   01/01/25 81.9 kg (180 lb 8.9 oz)   12/24/24 80 kg (176 lb 5.9 oz)   12/18/24 79.4 kg (175 lb)   Diuretics per cardiology currently on hold  Hold off on further IV fluids for now    Primary hypertension  On Norvasc 10 mg p.o. daily, hydralazine 25 mg p.o. twice daily, Metroprolol 12.5 mg p.o. every 12   Blood pressure better controlled now back on p.o. meds    I have reviewed the nephrology recommendations including no changes from renal standpoint stable for discharge from medically cleared hold off on diuretics for now, with surgical team, and we are in agreement with renal plan including the information outlined above.     Subjective   Brief History of Admission -     Patient seen and examined in his room blood pressures improving afebrile urine output 1.1 L wanting to be discharged soon as possible    Objective :  Temp:  [97.9 °F (36.6 °C)-99.1 °F (37.3 °C)] 97.9 °F (36.6 °C)  HR:  [58-80] 71  BP: (114-172)/(45-56) 114/56  Resp:  [17-20] 17  SpO2:  [93 %-98 %] 94 %  O2 Device: None (Room air)    Current Weight: Weight - Scale: 81.9 kg (180 lb 8.9 oz)  First Weight: Weight - Scale: 77.8 kg (171 lb 8.3 oz)  I/O         12/29 0701 12/30 0700 12/30 0701 12/31 0700 12/31 0701 01/01 0700    P.O. 600  0    I.V. (mL/kg) 1195 (14.2) 1290 (15.4)     IV Piggyback 500      Total Intake(mL/kg) 2295 (27.3) 1290 (15.4) 0 (0)    Urine (mL/kg/hr) 650 (0.3) 1800 (0.9) 325 (0.5)    Emesis/NG output 50 100 0    Stool 0   0    Total Output 700 1900 325    Net +1595 -610 -325           Unmeasured Urine Occurrence 1 x      Unmeasured Stool Occurrence 1 x  1 x          Physical Exam  Vitals and nursing note reviewed.   Constitutional:       General: He is not in acute distress.     Appearance: He is normal weight. He is not toxic-appearing.   HENT:      Head: Normocephalic and atraumatic.      Mouth/Throat:      Pharynx: No oropharyngeal exudate.   Eyes:      General: No scleral icterus.  Cardiovascular:      Rate and Rhythm: Normal rate.   Pulmonary:      Effort: No respiratory distress.      Breath sounds: No wheezing.   Abdominal:      General: There is no distension.      Palpations: Abdomen is soft.      Tenderness: There is no abdominal tenderness.   Musculoskeletal:      Cervical back: No rigidity.   Skin:     Coloration: Skin is not jaundiced.   Neurological:      General: No focal deficit present.      Mental Status: He is alert. Mental status is at baseline.   Psychiatric:         Mood and Affect: Mood normal.       ROS:  Constitutional:  No chills, no fever.   HENT:  No sore throat  Respiratory:  No cough, no hemoptysis, no wheezing.    Cardiovascular:  no leg swelling.   Gastrointestinal:  No constipation, no diarrhea.   Genitourinary:  No dysuria and hematuria. No decreased urine output.  Musculoskeletal:  No back pain.   Neurological:  no dizziness, No headaches.   Psychiatric/Behavioral:  No agitation,  All other systems reviewed and are negative.        Medications:    Current Facility-Administered Medications:     acetaminophen (TYLENOL) tablet 975 mg, 975 mg, Oral, Q8H [facility], Roldan Wilkes MD, 975 mg at 01/01/25 0547    amLODIPine (NORVASC) tablet 10 mg, 10 mg, Oral, Daily, Renee Wooten PA-C, 10 mg at 01/01/25 0913    [Held by provider] atorvastatin (LIPITOR) tablet 80 mg, 80 mg, Oral, HS, Simón Cee MD, 80 mg at 12/28/24 2112    [Held by provider] calcium carbonate (TUMS) chewable tablet 500 mg, 500 mg, Oral,  Daily PRN, Evelyn Appiah MD    clopidogrel (PLAVIX) tablet 75 mg, 75 mg, Oral, Daily, Renee Wooten PA-C, 75 mg at 01/01/25 0913    heparin (porcine) subcutaneous injection 5,000 Units, 5,000 Units, Subcutaneous, Q8H [facility], Simón Cee MD, 5,000 Units at 01/01/25 0547    hydrALAZINE (APRESOLINE) injection 5 mg, 5 mg, Intravenous, Q4H PRN, Roldan Wilkes MD, 5 mg at 12/31/24 0447    hydrALAZINE (APRESOLINE) tablet 25 mg, 25 mg, Oral, BID, Renee Wooten PA-C, 25 mg at 01/01/25 0913    HYDROmorphone (DILAUDID) injection 0.5 mg, 0.5 mg, Intravenous, Q3H PRN, Simón Cee MD    HYDROmorphone HCl (DILAUDID) injection 0.2 mg, 0.2 mg, Intravenous, Q2H PRN, Roldan Wilkes MD, 0.2 mg at 12/30/24 0148    insulin lispro (HumALOG/ADMELOG) 100 units/mL subcutaneous injection 1-5 Units, 1-5 Units, Subcutaneous, Q6H, 1 Units at 12/30/24 1301 **AND** [PENDING] insulin lispro (HumALOG/ADMELOG) 100 units/mL subcutaneous injection 1-5 Units, 1-5 Units, Subcutaneous, 4x Daily (AC & HS) **AND** Fingerstick Glucose (POCT), , , Q6H **AND** [PENDING] Fingerstick Glucose (POCT), , , BID, Roldan Wilkes MD    labetalol (NORMODYNE) injection 10 mg, 10 mg, Intravenous, Q3H PRN, Jb Samuel MD, 10 mg at 12/31/24 0827    metoclopramide (REGLAN) injection 10 mg, 10 mg, Intravenous, Q6H PRN, Evelyn Appiah MD, 10 mg at 12/28/24 1123    metoprolol tartrate (LOPRESSOR) partial tablet 12.5 mg, 12.5 mg, Oral, Q12H TRENT, Renee Wooten PA-C, 12.5 mg at 01/01/25 0913    nicotine (NICODERM CQ) 14 mg/24hr TD 24 hr patch 1 patch, 1 patch, Transdermal, Daily, Jb Samuel MD, 1 patch at 01/01/25 0914    ondansetron (ZOFRAN) injection 4 mg, 4 mg, Intravenous, Q4H PRN, Simón Cee MD, 4 mg at 12/29/24 1627    oxyCODONE (ROXICODONE) split tablet 2.5 mg, 2.5 mg, Oral, Q4H PRN, 2.5 mg at 12/29/24 0227 **OR** oxyCODONE (ROXICODONE) IR tablet 5 mg, 5 mg, Oral, Q4H PRN, Roldan Wilkes MD    pancrelipase (Lip-Prot-Amyl) (CREON) delayed release  "capsule 24,000 Units, 24,000 Units, Oral, TID With Meals, Evelyn Appiah MD, 24,000 Units at 01/01/25 0913    pantoprazole (PROTONIX) EC tablet 40 mg, 40 mg, Oral, Early Morning, Renee Wooten PA-C, 40 mg at 01/01/25 0547    [Held by provider] polyethylene glycol (MIRALAX) packet 17 g, 17 g, Oral, Daily, Evelyn Appiah MD, 17 g at 12/29/24 0800    [Held by provider] senna-docusate sodium (SENOKOT S) 8.6-50 mg per tablet 1 tablet, 1 tablet, Oral, HS, Roldan Wilkes MD, 1 tablet at 12/28/24 2112    [Held by provider] simethicone (MYLICON) chewable tablet 80 mg, 80 mg, Oral, Q6H PRN, Roldan Wilkes MD, 80 mg at 12/29/24 1627      Lab Results: I have reviewed the following results:  Results from last 7 days   Lab Units 01/01/25  0249 12/31/24  0049 12/30/24  1426 12/30/24  0328 12/29/24  0338 12/28/24  1222 12/28/24  0302 12/27/24  1431 12/26/24  0558   WBC Thousand/uL 4.04* 4.46  --  3.99*  --  7.42  --   --  10.12   HEMOGLOBIN g/dL 7.8* 8.6* 8.0* 7.5*  --  8.9*  --   --  8.3*   HEMATOCRIT % 24.6* 26.2* 24.4* 23.4*  --  28.1*  --   --  26.1*   PLATELETS Thousands/uL 183 157  --  135*  --  155  --   --  131*   POTASSIUM mmol/L 3.8 4.0  --  3.7 3.5 3.7 3.8 3.7 3.9   CHLORIDE mmol/L 112* 109*  --  109* 106 106 107 109* 112*   CO2 mmol/L 24 25  --  24 24 25 23 24 25   BUN mg/dL 47* 52*  --  55* 54* 48* 46* 43* 38*   CREATININE mg/dL 1.99* 2.33*  --  2.46* 2.78* 2.59* 2.52* 2.40* 1.85*   CALCIUM mg/dL 8.5 8.6  --  8.3* 8.1* 8.3* 8.2* 8.2* 8.5   MAGNESIUM mg/dL 2.1 2.1  --  2.0 2.0  --   --   --  2.3   PHOSPHORUS mg/dL  --  2.7  --  2.3 3.2  --   --   --  3.1       Administrative Statements     Portions of the record may have been created with voice recognition software. Occasional wrong word or \"sound a like\" substitutions may have occurred due to the inherent limitations of voice recognition software. Read the chart carefully and recognize, using context, where substitutions have occurred.If you have any questions, please " contact the dictating provider.

## 2024-12-31 NOTE — PROGRESS NOTES
Progress Note - Surgery-General   Name: Jay Roach Jr. 79 y.o. male I MRN: [id number]  [facility] Date of Admission: 12/24/2024   Date of Service: 12/31/2024 I Hospital Day: 7    Assessment & Plan  Mesenteric ischemia (HCC)  78 yo male now s/p 12/24 Ex lap, ADA, mesenteric agram, retrograde open mesenteric stent    Hypertensive to the 180's systolic and oxygen has been weaned to 5L of NC  UOP 1400  NG with gastric output minimal    WBC 4.4 from 3.9  Hgb 8.6 from 8.0  Cr 2.33 from 2.46    Plan  Discontinue NGT  Start sips of clears  Continue IV fluids  PRN pain meds and anti nausea meds  DVT prophylaxis  Restart PO blood pressure and cardiac meds  Discontinue rubi  Appreciate vascular recs  Appreciate cardiology recs  Appreciate nephro recs  Consider when to restart Plavix  Chronic diastolic CHF (congestive heart failure) ([facility])  Wt Readings from Last 3 Encounters:   12/30/24 84.1 kg (185 lb 6.5 oz)   12/24/24 80 kg (176 lb 5.9 oz)   12/18/24 79.4 kg (175 lb)             CKD (chronic kidney disease) stage 4, GFR 15-29 ml/min ([facility])  Lab Results   Component Value Date    EGFR 25 12/31/2024    EGFR 23 12/30/2024    EGFR 20 12/29/2024    CREATININE 2.33 (H) 12/31/2024    CREATININE 2.46 (H) 12/30/2024    CREATININE 2.78 (H) 12/29/2024     SHOAIB (acute kidney injury) ([facility])          Subjective   No acute events overnight. Patient appears to be more delirious despite being Aox3. Denies having nausea, vomiting, fevers, chills, chest pain, shortness of breath. Minimal abdominal pain. Adequate urine output in rubi. No bowel movements but passing flatus.     Objective :  Temp:  [97.9 °F (36.6 °C)-99.1 °F (37.3 °C)] 98.5 °F (36.9 °C)  HR:  [55-79] 79  BP: (157-182)/(49-60) 164/51  Resp:  [17-18] 17  SpO2:  [89 %-98 %] 95 %  O2 Device: Nasal cannula  Nasal Cannula O2 Flow Rate (L/min):  [44 L/min] 44 L/min    I/O         12/29 0701 12/30 0700 12/30 0701 12/31 0700    P.O. 600     I.V. (mL/kg) 1195 (14.2)  1290 (15.3)    IV Piggyback 500     Total Intake(mL/kg) 2295 (27.3) 1290 (15.3)    Urine (mL/kg/hr) 650 (0.3) 1400 (0.7)    Emesis/NG output 50     Stool 0     Total Output 700 1400    Net +1595 -110          Unmeasured Urine Occurrence 1 x     Unmeasured Stool Occurrence 1 x           Lines/Drains/Airways       Active Status       Name Placement date Placement time Site Days    NG/OG/Enteral Tube Nasogastric Left nare 12/30/24  0058  Left nare  1    Urethral Catheter Latex 16 Fr. 12/29/24  2245  -- 1                  Physical Exam    Physical Exam:  General: No acute distress, alert and oriented  Neuro: alert, oriented x3  HENT: PERRL, EOMI  CV: Well perfused, regular rate and rhythm  Lungs: Normal work of breathing, no increased respiratory effort  Abdomen: Soft, nontender, mildly distended. Incisions are clean/ dry/intact.   MSK/Extremities: No edema, clubbing or cyanosis  Skin: Warm, dry  '    Lab Results: I have reviewed the following results:  Recent Labs     12/31/24  0049   WBC 4.46   HGB 8.6*   HCT 26.2*      SODIUM 144   K 4.0   *   CO2 25   BUN 52*   CREATININE 2.33*   GLUC 114   MG 2.1   PHOS 2.7           VTE Pharmacologic Prophylaxis: Heparin  VTE Mechanical Prophylaxis: sequential compression device

## 2024-12-31 NOTE — OCCUPATIONAL THERAPY NOTE
Occupational Therapy Progress Note     Patient Name: Jay Roach Jr.  Today's Date: 12/31/2024  Problem List  Principal Problem:    Mesenteric ischemia ([facility])  Active Problems:    S/P CABG (coronary artery bypass graft)    Primary hypertension    Coronary artery disease involving native coronary artery of native heart without angina pectoris    Sinus bradycardia    CKD (chronic kidney disease) stage 4, GFR 15-29 ml/min ([facility])    Chronic diastolic CHF (congestive heart failure) ([facility])    S/P TAVR (transcatheter aortic valve replacement)    SHOAIB (acute kidney injury) ([facility])            12/31/24 1053   OT Last Visit   OT Visit Date 12/31/24   Note Type   Note Type Treatment   Pain Assessment   Pain Assessment Tool 0-10   Pain Score No Pain   Restrictions/Precautions   Weight Bearing Precautions Per Order No   Other Precautions Chair Alarm;Bed Alarm;Fall Risk;Pain   Therapeutic Excerise-Strength   UE Strength Yes   Right Upper Extremity- Strength   RUE Strength Comment resistance applied to towel (isometric) and pt performed 12 rows and shoulder flexion, each 1x. limited 2' fatigue.   Cognition   Overall Cognitive Status Impaired   Arousal/Participation Alert;Responsive   Attention Within functional limits   Orientation Level Oriented to person;Oriented to situation;Disoriented to place;Disoriented to time   Memory Within functional limits   Following Commands Follows all commands and directions without difficulty   Comments pt cooperative, requires occasional vc for redirection and has overall fair safety awareness and insight to condition.   Activity Tolerance   Activity Tolerance Patient limited by fatigue   Medical Staff Made Aware rn   Assessment   Assessment Pt seen on this date for OT session focusing on ADL retraining, cognitive reorientation, body mechanics, transfer retraining, increasing activity tolerance/endurance and EOB sitting to increase ability to participate in ADL/functional tasks. Pt was found in  chair and was left in chair w/ all needs within reach, chair alarm on. Pt completed transfers and  fm w min ax1 c rw. Performed there-ex: rows and shoulder flexion x12.  Pt w/ improvements in transfer ability, activity tolerance, however is still limited 2* decreased ADL/High-level ADL status, decreased activity tolerance/endurance, decreased cognition, decreased self-care trans, decreased safety awareness and insight to condition.   The patient's raw score on the AM-PAC Daily Activity Inpatient Short Form is 20. A raw score of greater than or equal to 19 suggests the patient may benefit from discharge to home. Please refer to the recommendation of the Occupational Therapist for safe discharge planning.  Recommending pt D/C to level  3 when medically stable. Pt will continue to benefit from acute OT services to meet goals.   Plan   Treatment Interventions ADL retraining;Functional transfer training;Endurance training;Patient/family training;Equipment evaluation/education;Compensatory technique education;Energy conservation;Activityengagement;UE strengthening/ROM;Cognitive reorientation   Goal Expiration Date 01/09/25   OT Treatment Day 2   OT Frequency 2-3x/wk   Discharge Recommendation   Rehab Resource Intensity Level, OT III (Minimum Resource Intensity)   AM-PAC Daily Activity Inpatient   Lower Body Dressing 2   Bathing 3   Toileting 3   Upper Body Dressing 4   Grooming 4   Eating 4   Daily Activity Raw Score 20   Daily Activity Standardized Score (Calc for Raw Score >=11) 42.03   AM-[facility] Applied Cognition Inpatient   Following a Speech/Presentation 4   Understanding Ordinary Conversation 4   Taking Medications 4   Remembering Where Things Are Placed or Put Away 4   Remembering List of 4-5 Errands 3   Taking Care of Complicated Tasks 3   Applied Cognition Raw Score 22   Applied Cognition Standardized Score 47.83   Modified Rosa Scale   Modified [place] Scale 4   End of Consult   Education Provided Yes   Patient  Position at End of Consult Bedside chair;Bed/Chair alarm activated;All needs within reach   Nurse Communication Nurse aware of consult         DESTINEE Lucero, OTR/L

## 2024-12-31 NOTE — ASSESSMENT & PLAN NOTE
Wt Readings from Last 3 Encounters:   01/01/25 81.9 kg (180 lb 8.9 oz)   12/24/24 80 kg (176 lb 5.9 oz)   12/18/24 79.4 kg (175 lb)   Diuretics per cardiology currently on hold  Hold off on further IV fluids for now

## 2025-01-01 LAB
ANION GAP SERPL CALCULATED.3IONS-SCNC: 9 MMOL/L (ref 4–13)
BASOPHILS # BLD AUTO: 0 THOUSANDS/ΜL (ref 0–0.1)
BASOPHILS NFR BLD AUTO: 0 % (ref 0–1)
BUN SERPL-MCNC: 47 MG/DL (ref 5–25)
CALCIUM SERPL-MCNC: 8.5 MG/DL (ref 8.4–10.2)
CHLORIDE SERPL-SCNC: 112 MMOL/L (ref 96–108)
CO2 SERPL-SCNC: 24 MMOL/L (ref 21–32)
CREAT SERPL-MCNC: 1.99 MG/DL (ref 0.6–1.3)
EOSINOPHIL # BLD AUTO: 0.18 THOUSAND/ΜL (ref 0–0.61)
EOSINOPHIL NFR BLD AUTO: 5 % (ref 0–6)
ERYTHROCYTE [DISTWIDTH] IN BLOOD BY AUTOMATED COUNT: 15.3 % (ref 11.6–15.1)
GFR SERPL CREATININE-BSD FRML MDRD: 30 ML/MIN/1.73SQ M
GLUCOSE SERPL-MCNC: 122 MG/DL (ref 65–140)
GLUCOSE SERPL-MCNC: 203 MG/DL (ref 65–140)
GLUCOSE SERPL-MCNC: 97 MG/DL (ref 65–140)
GLUCOSE SERPL-MCNC: 98 MG/DL (ref 65–140)
HCT VFR BLD AUTO: 24.6 % (ref 36.5–49.3)
HGB BLD-MCNC: 7.8 G/DL (ref 12–17)
IMM GRANULOCYTES # BLD AUTO: 0.02 THOUSAND/UL (ref 0–0.2)
IMM GRANULOCYTES NFR BLD AUTO: 1 % (ref 0–2)
LYMPHOCYTES # BLD AUTO: 0.63 THOUSANDS/ΜL (ref 0.6–4.47)
LYMPHOCYTES NFR BLD AUTO: 16 % (ref 14–44)
MAGNESIUM SERPL-MCNC: 2.1 MG/DL (ref 1.9–2.7)
MCH RBC QN AUTO: 31.1 PG (ref 26.8–34.3)
MCHC RBC AUTO-ENTMCNC: 31.7 G/DL (ref 31.4–37.4)
MCV RBC AUTO: 98 FL (ref 82–98)
MONOCYTES # BLD AUTO: 0.48 THOUSAND/ΜL (ref 0.17–1.22)
MONOCYTES NFR BLD AUTO: 12 % (ref 4–12)
NEUTROPHILS # BLD AUTO: 2.73 THOUSANDS/ΜL (ref 1.85–7.62)
NEUTS SEG NFR BLD AUTO: 66 % (ref 43–75)
NRBC BLD AUTO-RTO: 0 /100 WBCS
PLATELET # BLD AUTO: 183 THOUSANDS/UL (ref 149–390)
PMV BLD AUTO: 10.8 FL (ref 8.9–12.7)
POTASSIUM SERPL-SCNC: 3.8 MMOL/L (ref 3.5–5.3)
RBC # BLD AUTO: 2.51 MILLION/UL (ref 3.88–5.62)
SODIUM SERPL-SCNC: 145 MMOL/L (ref 135–147)
WBC # BLD AUTO: 4.04 THOUSAND/UL (ref 4.31–10.16)

## 2025-01-01 PROCEDURE — 99024 POSTOP FOLLOW-UP VISIT: CPT | Performed by: SURGERY

## 2025-01-01 PROCEDURE — 83735 ASSAY OF MAGNESIUM: CPT | Performed by: SURGERY

## 2025-01-01 PROCEDURE — 80048 BASIC METABOLIC PNL TOTAL CA: CPT | Performed by: SURGERY

## 2025-01-01 PROCEDURE — 82948 REAGENT STRIP/BLOOD GLUCOSE: CPT

## 2025-01-01 PROCEDURE — 99233 SBSQ HOSP IP/OBS HIGH 50: CPT | Performed by: INTERNAL MEDICINE

## 2025-01-01 PROCEDURE — 85025 COMPLETE CBC W/AUTO DIFF WBC: CPT | Performed by: SURGERY

## 2025-01-01 RX ORDER — INSULIN LISPRO 100 [IU]/ML
1-5 INJECTION, SOLUTION INTRAVENOUS; SUBCUTANEOUS
Status: DISCONTINUED | OUTPATIENT
Start: 2025-01-02 | End: 2025-01-03 | Stop reason: HOSPADM

## 2025-01-01 RX ORDER — INSULIN LISPRO 100 [IU]/ML
1-5 INJECTION, SOLUTION INTRAVENOUS; SUBCUTANEOUS EVERY 6 HOURS
Status: DISCONTINUED | OUTPATIENT
Start: 2025-01-02 | End: 2025-01-01

## 2025-01-01 RX ADMIN — PANCRELIPASE 24000 UNITS: 120000; 24000; 76000 CAPSULE, DELAYED RELEASE PELLETS ORAL at 16:54

## 2025-01-01 RX ADMIN — HYDRALAZINE HYDROCHLORIDE 25 MG: 25 TABLET ORAL at 09:13

## 2025-01-01 RX ADMIN — HYDRALAZINE HYDROCHLORIDE 25 MG: 25 TABLET ORAL at 21:59

## 2025-01-01 RX ADMIN — HEPARIN SODIUM 5000 UNITS: 5000 INJECTION, SOLUTION INTRAVENOUS; SUBCUTANEOUS at 05:47

## 2025-01-01 RX ADMIN — Medication 12.5 MG: at 21:59

## 2025-01-01 RX ADMIN — PANCRELIPASE 24000 UNITS: 120000; 24000; 76000 CAPSULE, DELAYED RELEASE PELLETS ORAL at 09:13

## 2025-01-01 RX ADMIN — NICOTINE 1 PATCH: 14 PATCH, EXTENDED RELEASE TRANSDERMAL at 09:14

## 2025-01-01 RX ADMIN — HEPARIN SODIUM 5000 UNITS: 5000 INJECTION, SOLUTION INTRAVENOUS; SUBCUTANEOUS at 14:10

## 2025-01-01 RX ADMIN — PANTOPRAZOLE SODIUM 40 MG: 40 TABLET, DELAYED RELEASE ORAL at 05:47

## 2025-01-01 RX ADMIN — HEPARIN SODIUM 5000 UNITS: 5000 INJECTION, SOLUTION INTRAVENOUS; SUBCUTANEOUS at 21:58

## 2025-01-01 RX ADMIN — CLOPIDOGREL BISULFATE 75 MG: 75 TABLET ORAL at 09:13

## 2025-01-01 RX ADMIN — ACETAMINOPHEN 975 MG: 325 TABLET, FILM COATED ORAL at 21:58

## 2025-01-01 RX ADMIN — ATORVASTATIN CALCIUM 80 MG: 80 TABLET, FILM COATED ORAL at 21:58

## 2025-01-01 RX ADMIN — INSULIN LISPRO 1 UNITS: 100 INJECTION, SOLUTION INTRAVENOUS; SUBCUTANEOUS at 18:20

## 2025-01-01 RX ADMIN — ACETAMINOPHEN 975 MG: 325 TABLET, FILM COATED ORAL at 05:47

## 2025-01-01 RX ADMIN — PANCRELIPASE 24000 UNITS: 120000; 24000; 76000 CAPSULE, DELAYED RELEASE PELLETS ORAL at 11:56

## 2025-01-01 RX ADMIN — Medication 12.5 MG: at 09:13

## 2025-01-01 RX ADMIN — AMLODIPINE BESYLATE 10 MG: 10 TABLET ORAL at 09:13

## 2025-01-01 RX ADMIN — ACETAMINOPHEN 975 MG: 325 TABLET, FILM COATED ORAL at 14:10

## 2025-01-01 NOTE — ASSESSMENT & PLAN NOTE
On Norvasc 10 mg p.o. daily, hydralazine 25 mg p.o. twice daily, Metroprolol 12.5 mg p.o. every 12   Increase hydralazine 50 mg p.o. twice daily

## 2025-01-01 NOTE — ASSESSMENT & PLAN NOTE
SHOAIB most likely secondary to FELIPE 12/24+ ischemic injury secondary to hemodynamic perturbation intraoperatively in light of underlying comorbidities plus failure to autoregulate) lisinopril with subsequent ATN  After review of records it appears that the patient has a baseline Creatinine of 1.8-2.2 mg/dL.  Admission creatinine of 2.08 mg/dL on 12/24/2024.  Creatinine today is at 1.99 mg/dL stable and at baseline still  Hold off on further IV fluids for now  Clinically euvolemic to minimally hypervolemic  Started on Lasix 40 mg p.o. Tuesday Thursday Saturday for now  check BMP, magnesium, phosphorus in a.m.  Place on a renal diet when allowed diet order.   Strict I/O.  Daily weights  Urinary retention protocol  Avoid nephrotoxins, adjust meds to appropriate GFR.  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 Era Wood for discharge from renal standpoint medically cleared  Ordered for lab work 1/13/2025  Message sent to the office for follow-up

## 2025-01-01 NOTE — PROGRESS NOTES
Progress Note - Surgery-General   Name: Jay Roach Jr. 79 y.o. male I MRN: [id number]  [facility] Date of Admission: 12/24/2024   Date of Service: 1/1/2025 I Hospital Day: 8    Assessment & Plan  Mesenteric ischemia (HCC)  78 yo male now s/p 12/24 Ex lap, ADA, mesenteric agram, retrograde open mesenteric stent    AVSS on 3L NC    UOP 1.1L    WBC 4.04 from 4.4  Hb 7.8 from 8.6  Creatinine 1.99 from 2.33    Plan  CLD  Fluids per nephro  PRN pain meds and anti nausea meds  DVT prophylaxis  Appreciate vascular recs  Appreciate cardiology recs  Appreciate nephro recs  Consider when to restart Plavix  Chronic diastolic CHF (congestive heart failure) ([facility])  Wt Readings from Last 3 Encounters:   01/01/25 81.9 kg (180 lb 8.9 oz)   12/24/24 80 kg (176 lb 5.9 oz)   12/18/24 79.4 kg (175 lb)             CKD (chronic kidney disease) stage 4, GFR 15-29 ml/min ([facility])  Lab Results   Component Value Date    EGFR 30 01/01/2025    EGFR 25 12/31/2024    EGFR 23 12/30/2024    CREATININE 1.99 (H) 01/01/2025    CREATININE 2.33 (H) 12/31/2024    CREATININE 2.46 (H) 12/30/2024     SHOAIB (acute kidney injury) ([facility])    Primary hypertension          Subjective   No acute events overnight. Patient appears to be more delirious despite being Aox3. Denies having nausea, vomiting, fevers, chills, chest pain, shortness of breath. Minimal abdominal pain. Adequate urine output in condom catheter bag. No bowel movements but passing flatus.     Objective :  Temp:  [98 °F (36.7 °C)-99.1 °F (37.3 °C)] 99.1 °F (37.3 °C)  HR:  [58-80] 58  BP: (153-180)/(45-57) 165/53  Resp:  [17-20] 18  SpO2:  [93 %-98 %] 96 %  O2 Device: None (Room air)    I/O         12/29 0701 12/30 0700 12/30 0701 12/31 0700    P.O. 600     I.V. (mL/kg) 1195 (14.2) 1290 (15.3)    IV Piggyback 500     Total Intake(mL/kg) 2295 (27.3) 1290 (15.3)    Urine (mL/kg/hr) 650 (0.3) 1400 (0.7)    Emesis/NG output 50     Stool 0     Total Output 700 1400    Net +1595 -110       RN checked pt's temp around 2130 as pt reported feeling feverish. Temp was 99.2 F. HR was 101 at the time as well. BP was 150/84. Scheduled medications were given, as well as PRN Tylenol. MD updated and orders for Ceftriaxone and lactic were placed. MD also updated on elevated procal levels.     Around 2230 pt's temp was still elevated at 102.8 F and HR was in upper 120s. MD notified, and 250 mL fluid bolus ordered. RN administered IV bolus and it is running currently. Blood cultures are ordered. Nursing staff will continue to monitor.        Unmeasured Urine Occurrence 1 x     Unmeasured Stool Occurrence 1 x               Physical Exam    Physical Exam:  General: No acute distress, alert and oriented  Neuro: alert, oriented x3  HENT: PERRL, EOMI  CV: Well perfused, regular rate and rhythm  Lungs: Normal work of breathing, no increased respiratory effort  Abdomen: Soft, nontender, mildly distended. Incisions are clean/ dry/intact.   MSK/Extremities: No edema, clubbing or cyanosis  Skin: Warm, dry  '    Lab Results: I have reviewed the following results:  Recent Labs     12/31/24  0049 01/01/25  0249   WBC 4.46 4.04*   HGB 8.6* 7.8*   HCT 26.2* 24.6*    183   SODIUM 144 145   K 4.0 3.8   * 112*   CO2 25 24   BUN 52* 47*   CREATININE 2.33* 1.99*   GLUC 114 97   MG 2.1 2.1   PHOS 2.7  --            VTE Pharmacologic Prophylaxis: Heparin  VTE Mechanical Prophylaxis: sequential compression device

## 2025-01-01 NOTE — ASSESSMENT & PLAN NOTE
Lab Results   Component Value Date    EGFR 30 01/01/2025    EGFR 25 12/31/2024    EGFR 23 12/30/2024    CREATININE 1.99 (H) 01/01/2025    CREATININE 2.33 (H) 12/31/2024    CREATININE 2.46 (H) 12/30/2024

## 2025-01-01 NOTE — PROGRESS NOTES
Progress Note - Nephrology   Name: Jay Roach Jr. 79 y.o. male I MRN: [id number]  [facility] I Date of Admission: 12/24/2024   Date of Service: 1/2/2025 I Hospital Day: 9    79-year-old male with multiple comorbidities including CKD stage IV, peripheral arterial disease status post bilateral revascularization, TAVR, CABG, hypertension and bilateral carotid stenosis admitted with abdominal pain CTA suspicious for pneumatosis and mesenteric ischemia taken to the OR for expiratory lap with retrograde open SMA stent. Nephrology consulted for evaluation and management of acute kidney injury.   Assessment & Plan  SHOAIB (acute kidney injury) (HCC)  SHOAIB most likely secondary to FELIPE 12/24+ ischemic injury secondary to hemodynamic perturbation intraoperatively in light of underlying comorbidities plus failure to autoregulate) lisinopril with subsequent ATN  After review of records it appears that the patient has a baseline Creatinine of 1.8-2.2 mg/dL.  Admission creatinine of 2.08 mg/dL on 12/24/2024.  Creatinine today is at 1.99 mg/dL stable and at baseline still  Hold off on further IV fluids for now  Clinically euvolemic to minimally hypervolemic  Started on Lasix 40 mg p.o. Tuesday Thursday Saturday for now  check BMP, magnesium, phosphorus in a.m.  Place on a renal diet when allowed diet order.   Strict I/O.  Daily weights  Urinary retention protocol  Avoid nephrotoxins, adjust meds to appropriate GFR.  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 Era Wood for discharge from renal standpoint medically cleared  Ordered for lab work 1/13/2025  Message sent to the office for follow-up  CKD (chronic kidney disease) stage 4, GFR 15-29 ml/min ([facility])  Lab Results   Component Value Date    EGFR 30 01/02/2025    EGFR 30 01/01/2025    EGFR 25 12/31/2024    CREATININE 1.99 (H) 01/02/2025    CREATININE  1.99 (H) 01/01/2025    CREATININE 2.33 (H) 12/31/2024   Review of record shows baseline creatinine 1.8 to 2.2 mg/dL   As an outpatient follows up with Dr. 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    Send office message to arrange for outpatient follow-up ordered lab work upon discharge  Mesenteric ischemia (HCC)  Status post expiratory laparotomy, lysis of adhesions mesenteric angiogram with SMA stenting on 12/24   Follow-up with vascular surgery and general surgery   Chronic diastolic CHF (congestive heart failure) (HCC)  Wt Readings from Last 3 Encounters:   01/02/25 82.7 kg (182 lb 5.1 oz)   12/24/24 80 kg (176 lb 5.9 oz)   12/18/24 79.4 kg (175 lb)   Started on Lasix 40 mg TTS  Hold off on further IV fluids for now    Primary hypertension  On Norvasc 10 mg p.o. daily, hydralazine 25 mg p.o. twice daily, Metroprolol 12.5 mg p.o. every 12   Increase hydralazine 50 mg p.o. twice daily    I have reviewed the nephrology recommendations including start low-dose diuretics stable and cleared from renal standpoint for discharge ordered for lab work his hospital discharge follow-up, with medicine team, and we are in agreement with renal plan including the information outlined above.     Subjective   Brief History of Admission -     Patient seen and examined in his room no overnight events blood pressure slightly elevated urine output 875 cc plus no overt complaints hopeful and waiting for discharge    Objective :  Temp:  [97.8 °F (36.6 °C)-98.2 °F (36.8 °C)] 98 °F (36.7 °C)  HR:  [50-66] 60  BP: (145-160)/(43-51) 145/43  Resp:  [17-18] 18  SpO2:  [94 %-98 %] 98 %  O2 Device: Nasal cannula    Current Weight: Weight - Scale: 82.7 kg (182 lb 5.1 oz)  First Weight: Weight - Scale: 77.8 kg (171 lb 8.3 oz)  I/O         12/30 0701 12/31 0700 12/31 0701 01/01 0700 01/01 0701 01/02 0700    P.O.  0 180    I.V. (mL/kg) 1290 (15.4)      IV  Piggyback       Total Intake(mL/kg) 1290 (15.4) 0 (0) 180 (2.2)    Urine (mL/kg/hr) 1800 (0.9) 1125 (0.6) 275 (0.4)    Emesis/NG output 100 0     Stool  0 0    Total Output 1900 1125 275    Net -610 -1125 -95           Unmeasured Urine Occurrence  1 x     Unmeasured Stool Occurrence  1 x 1 x          Physical Exam  Vitals and nursing note reviewed.   Constitutional:       General: He is not in acute distress.     Appearance: Normal appearance. He is normal weight. He is ill-appearing. He is not toxic-appearing.   HENT:      Head: Normocephalic.      Mouth/Throat:      Pharynx: No oropharyngeal exudate.   Eyes:      General: No scleral icterus.  Cardiovascular:      Rate and Rhythm: Normal rate.   Pulmonary:      Effort: No respiratory distress.      Breath sounds: No wheezing.   Abdominal:      General: There is no distension.      Palpations: Abdomen is soft.      Tenderness: There is no abdominal tenderness.   Musculoskeletal:         General: Swelling present.      Cervical back: No rigidity.      Comments: Trace bilateral lower extremity edema   Skin:     Coloration: Skin is not jaundiced.   Neurological:      General: No focal deficit present.      Mental Status: He is alert.   Psychiatric:         Mood and Affect: Mood normal.       ROS:  Constitutional:  No chills, no fever.   HENT:  No sore throat  Respiratory:  No cough, no hemoptysis, no wheezing.    Cardiovascular:  + leg swelling.   Gastrointestinal:  No constipation, no diarrhea.   Genitourinary:  No dysuria and hematuria. No decreased urine output.  Musculoskeletal:  No back pain.   Neurological:  no dizziness, No headaches.   Psychiatric/Behavioral:  No agitation,  All other systems reviewed and are negative.        Medications:    Current Facility-Administered Medications:     acetaminophen (TYLENOL) tablet 975 mg, 975 mg, Oral, Q8H [facility], Roldan Wilkes MD, 975 mg at 01/02/25 0535    amLODIPine (NORVASC) tablet 10 mg, 10 mg, Oral, Daily, Renee Wooten,  LYN, 10 mg at 01/02/25 0901    atorvastatin (LIPITOR) tablet 80 mg, 80 mg, Oral, HS, Wilfrido Billingsley MD, 80 mg at 01/01/25 2158    calcium carbonate (TUMS) chewable tablet 500 mg, 500 mg, Oral, Daily PRN, Wilfrido Billingsley MD    clopidogrel (PLAVIX) tablet 75 mg, 75 mg, Oral, Daily, Renee Wooten PA-C, 75 mg at 01/02/25 0901    heparin (porcine) subcutaneous injection 5,000 Units, 5,000 Units, Subcutaneous, Q8H [facility], Simón Cee MD, 5,000 Units at 01/02/25 0535    hydrALAZINE (APRESOLINE) injection 5 mg, 5 mg, Intravenous, Q4H PRN, Rodlan Wilkes MD, 5 mg at 12/31/24 0447    hydrALAZINE (APRESOLINE) tablet 25 mg, 25 mg, Oral, BID, Renee Wooten PA-C, 25 mg at 01/02/25 0901    HYDROmorphone (DILAUDID) injection 0.5 mg, 0.5 mg, Intravenous, Q3H PRN, Simón Cee MD    HYDROmorphone HCl (DILAUDID) injection 0.2 mg, 0.2 mg, Intravenous, Q2H PRN, Roldan Wilkes MD, 0.2 mg at 12/30/24 0148    insulin lispro (HumALOG/ADMELOG) 100 units/mL subcutaneous injection 1-5 Units, 1-5 Units, Subcutaneous, TID With Meals **AND** [PENDING] insulin lispro (HumALOG/ADMELOG) 100 units/mL subcutaneous injection 1-5 Units, 1-5 Units, Subcutaneous, 4x Daily (AC & HS) **AND** Fingerstick Glucose (POCT), , , TID **AND** [PENDING] Fingerstick Glucose (POCT), , , BID, Roldan Wilkes MD    labetalol (NORMODYNE) injection 10 mg, 10 mg, Intravenous, Q3H PRN, Jb Samuel MD, 10 mg at 12/31/24 0827    melatonin tablet 6 mg, 6 mg, Oral, HS, Tuesday M ZHANNA Sosa    metoclopramide (REGLAN) injection 10 mg, 10 mg, Intravenous, Q6H PRN, Evelyn Appiah MD, 10 mg at 12/28/24 1123    metoprolol tartrate (LOPRESSOR) partial tablet 12.5 mg, 12.5 mg, Oral, Q12H [facility], Renee Wooten PA-C, 12.5 mg at 01/02/25 0901    nicotine (NICODERM CQ) 14 mg/24hr TD 24 hr patch 1 patch, 1 patch, Transdermal, Daily, Jb Samuel MD, 1 patch at 01/02/25 0900    ondansetron (ZOFRAN) injection 4 mg, 4 mg, Intravenous, Q4H PRN, Simón Cee MD, 4 mg at 12/29/24  "1627    oxyCODONE (ROXICODONE) split tablet 2.5 mg, 2.5 mg, Oral, Q4H PRN, 2.5 mg at 12/29/24 0227 **OR** oxyCODONE (ROXICODONE) IR tablet 5 mg, 5 mg, Oral, Q4H PRN, Roldan Wilkes MD    pancrelipase (Lip-Prot-Amyl) (CREON) delayed release capsule 24,000 Units, 24,000 Units, Oral, TID With Meals, Evelyn Appiah MD, 24,000 Units at 01/02/25 0904    pantoprazole (PROTONIX) EC tablet 40 mg, 40 mg, Oral, Early Morning, Renee Wooten PA-C, 40 mg at 01/02/25 0535    [Held by provider] polyethylene glycol (MIRALAX) packet 17 g, 17 g, Oral, Daily, Evelyn Appiah MD, 17 g at 12/29/24 0800    [Held by provider] senna-docusate sodium (SENOKOT S) 8.6-50 mg per tablet 1 tablet, 1 tablet, Oral, HS, Roldan Wilkes MD, 1 tablet at 12/28/24 2112    simethicone (MYLICON) chewable tablet 80 mg, 80 mg, Oral, Q6H PRN, Wilfrido Billingsley MD, 80 mg at 12/29/24 1627      Lab Results: I have reviewed the following results:  Results from last 7 days   Lab Units 01/02/25  0404 01/01/25  0249 12/31/24  0049 12/30/24  1426 12/30/24  0328 12/29/24  0338 12/28/24  1222 12/28/24  0302   WBC Thousand/uL 5.49 4.04* 4.46  --  3.99*  --  7.42  --    HEMOGLOBIN g/dL 8.6* 7.8* 8.6* 8.0* 7.5*  --  8.9*  --    HEMATOCRIT % 27.0* 24.6* 26.2* 24.4* 23.4*  --  28.1*  --    PLATELETS Thousands/uL 217 183 157  --  135*  --  155  --    POTASSIUM mmol/L 3.5 3.8 4.0  --  3.7 3.5 3.7 3.8   CHLORIDE mmol/L 109* 112* 109*  --  109* 106 106 107   CO2 mmol/L 23 24 25  --  24 24 25 23   BUN mg/dL 45* 47* 52*  --  55* 54* 48* 46*   CREATININE mg/dL 1.99* 1.99* 2.33*  --  2.46* 2.78* 2.59* 2.52*   CALCIUM mg/dL 8.3* 8.5 8.6  --  8.3* 8.1* 8.3* 8.2*   MAGNESIUM mg/dL  --  2.1 2.1  --  2.0 2.0  --   --    PHOSPHORUS mg/dL  --   --  2.7  --  2.3 3.2  --   --        Administrative Statements     Portions of the record may have been created with voice recognition software. Occasional wrong word or \"sound a like\" substitutions may have occurred due to the inherent limitations of " voice recognition software. Read the chart carefully and recognize, using context, where substitutions have occurred.If you have any questions, please contact the dictating provider.

## 2025-01-01 NOTE — ASSESSMENT & PLAN NOTE
Wt Readings from Last 3 Encounters:   01/02/25 82.7 kg (182 lb 5.1 oz)   12/24/24 80 kg (176 lb 5.9 oz)   12/18/24 79.4 kg (175 lb)   Started on Lasix 40 mg TTS  Hold off on further IV fluids for now

## 2025-01-01 NOTE — ASSESSMENT & PLAN NOTE
80 yo male now s/p 12/24 Ex lap, ADA, mesenteric agram, retrograde open mesenteric stent    AVSS on 3L NC    UOP 1.1L    WBC 4.04 from 4.4  Hb 7.8 from 8.6  Creatinine 1.99 from 2.33    Plan  CLD  Fluids per nephro  PRN pain meds and anti nausea meds  DVT prophylaxis  Appreciate vascular recs  Appreciate cardiology recs  Appreciate nephro recs  Consider when to restart Plavix

## 2025-01-01 NOTE — ASSESSMENT & PLAN NOTE
Wt Readings from Last 3 Encounters:   01/01/25 81.9 kg (180 lb 8.9 oz)   12/24/24 80 kg (176 lb 5.9 oz)   12/18/24 79.4 kg (175 lb)

## 2025-01-01 NOTE — ASSESSMENT & PLAN NOTE
Lab Results   Component Value Date    EGFR 30 01/02/2025    EGFR 30 01/01/2025    EGFR 25 12/31/2024    CREATININE 1.99 (H) 01/02/2025    CREATININE 1.99 (H) 01/01/2025    CREATININE 2.33 (H) 12/31/2024   Review of record shows baseline creatinine 1.8 to 2.2 mg/dL   As an outpatient follows up with Dr. 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    Send office message to arrange for outpatient follow-up ordered lab work upon discharge

## 2025-01-02 ENCOUNTER — TELEPHONE (OUTPATIENT)
Dept: CARDIOLOGY CLINIC | Facility: CLINIC | Age: 80
End: 2025-01-02

## 2025-01-02 PROBLEM — R26.2 AMBULATORY DYSFUNCTION: Status: ACTIVE | Noted: 2025-01-02

## 2025-01-02 PROBLEM — R54 FRAILTY SYNDROME IN GERIATRIC PATIENT: Status: ACTIVE | Noted: 2025-01-02

## 2025-01-02 PROBLEM — R41.3 SHORT-TERM MEMORY LOSS: Status: ACTIVE | Noted: 2025-01-02

## 2025-01-02 PROBLEM — G47.00 INSOMNIA: Status: ACTIVE | Noted: 2025-01-02

## 2025-01-02 PROBLEM — G93.40 ENCEPHALOPATHY: Status: ACTIVE | Noted: 2025-01-02

## 2025-01-02 LAB
ANION GAP SERPL CALCULATED.3IONS-SCNC: 9 MMOL/L (ref 4–13)
BASOPHILS # BLD AUTO: 0.01 THOUSANDS/ΜL (ref 0–0.1)
BASOPHILS NFR BLD AUTO: 0 % (ref 0–1)
BUN SERPL-MCNC: 45 MG/DL (ref 5–25)
CALCIUM SERPL-MCNC: 8.3 MG/DL (ref 8.4–10.2)
CHLORIDE SERPL-SCNC: 109 MMOL/L (ref 96–108)
CO2 SERPL-SCNC: 23 MMOL/L (ref 21–32)
CREAT SERPL-MCNC: 1.99 MG/DL (ref 0.6–1.3)
EOSINOPHIL # BLD AUTO: 0.3 THOUSAND/ΜL (ref 0–0.61)
EOSINOPHIL NFR BLD AUTO: 6 % (ref 0–6)
ERYTHROCYTE [DISTWIDTH] IN BLOOD BY AUTOMATED COUNT: 15.4 % (ref 11.6–15.1)
GFR SERPL CREATININE-BSD FRML MDRD: 30 ML/MIN/1.73SQ M
GLUCOSE SERPL-MCNC: 102 MG/DL (ref 65–140)
GLUCOSE SERPL-MCNC: 102 MG/DL (ref 65–140)
GLUCOSE SERPL-MCNC: 109 MG/DL (ref 65–140)
GLUCOSE SERPL-MCNC: 127 MG/DL (ref 65–140)
GLUCOSE SERPL-MCNC: 179 MG/DL (ref 65–140)
GLUCOSE SERPL-MCNC: 205 MG/DL (ref 65–140)
HCT VFR BLD AUTO: 27 % (ref 36.5–49.3)
HGB BLD-MCNC: 8.6 G/DL (ref 12–17)
IMM GRANULOCYTES # BLD AUTO: 0.04 THOUSAND/UL (ref 0–0.2)
IMM GRANULOCYTES NFR BLD AUTO: 1 % (ref 0–2)
LYMPHOCYTES # BLD AUTO: 0.63 THOUSANDS/ΜL (ref 0.6–4.47)
LYMPHOCYTES NFR BLD AUTO: 12 % (ref 14–44)
MCH RBC QN AUTO: 30.9 PG (ref 26.8–34.3)
MCHC RBC AUTO-ENTMCNC: 31.9 G/DL (ref 31.4–37.4)
MCV RBC AUTO: 97 FL (ref 82–98)
MONOCYTES # BLD AUTO: 0.51 THOUSAND/ΜL (ref 0.17–1.22)
MONOCYTES NFR BLD AUTO: 9 % (ref 4–12)
NEUTROPHILS # BLD AUTO: 4 THOUSANDS/ΜL (ref 1.85–7.62)
NEUTS SEG NFR BLD AUTO: 72 % (ref 43–75)
NRBC BLD AUTO-RTO: 0 /100 WBCS
PLATELET # BLD AUTO: 217 THOUSANDS/UL (ref 149–390)
PMV BLD AUTO: 10.5 FL (ref 8.9–12.7)
POTASSIUM SERPL-SCNC: 3.5 MMOL/L (ref 3.5–5.3)
RBC # BLD AUTO: 2.78 MILLION/UL (ref 3.88–5.62)
SODIUM SERPL-SCNC: 141 MMOL/L (ref 135–147)
WBC # BLD AUTO: 5.49 THOUSAND/UL (ref 4.31–10.16)

## 2025-01-02 PROCEDURE — 99233 SBSQ HOSP IP/OBS HIGH 50: CPT | Performed by: INTERNAL MEDICINE

## 2025-01-02 PROCEDURE — 99223 1ST HOSP IP/OBS HIGH 75: CPT | Performed by: NURSE PRACTITIONER

## 2025-01-02 PROCEDURE — 99024 POSTOP FOLLOW-UP VISIT: CPT | Performed by: SURGERY

## 2025-01-02 PROCEDURE — 85025 COMPLETE CBC W/AUTO DIFF WBC: CPT | Performed by: SURGERY

## 2025-01-02 PROCEDURE — 80048 BASIC METABOLIC PNL TOTAL CA: CPT | Performed by: SURGERY

## 2025-01-02 PROCEDURE — G0316 PR PROLONG INPT EVAL ADD15 M: HCPCS | Performed by: NURSE PRACTITIONER

## 2025-01-02 PROCEDURE — 82948 REAGENT STRIP/BLOOD GLUCOSE: CPT

## 2025-01-02 RX ORDER — FUROSEMIDE 40 MG/1
40 TABLET ORAL 3 TIMES WEEKLY
Status: DISCONTINUED | OUTPATIENT
Start: 2025-01-02 | End: 2025-01-03 | Stop reason: HOSPADM

## 2025-01-02 RX ORDER — HYDRALAZINE HYDROCHLORIDE 50 MG/1
50 TABLET, FILM COATED ORAL 2 TIMES DAILY
Status: DISCONTINUED | OUTPATIENT
Start: 2025-01-02 | End: 2025-01-03 | Stop reason: HOSPADM

## 2025-01-02 RX ADMIN — ATORVASTATIN CALCIUM 80 MG: 80 TABLET, FILM COATED ORAL at 21:10

## 2025-01-02 RX ADMIN — HYDRALAZINE HYDROCHLORIDE 25 MG: 25 TABLET ORAL at 09:01

## 2025-01-02 RX ADMIN — HEPARIN SODIUM 5000 UNITS: 5000 INJECTION, SOLUTION INTRAVENOUS; SUBCUTANEOUS at 14:55

## 2025-01-02 RX ADMIN — HEPARIN SODIUM 5000 UNITS: 5000 INJECTION, SOLUTION INTRAVENOUS; SUBCUTANEOUS at 05:35

## 2025-01-02 RX ADMIN — AMLODIPINE BESYLATE 10 MG: 10 TABLET ORAL at 09:01

## 2025-01-02 RX ADMIN — NICOTINE 1 PATCH: 14 PATCH, EXTENDED RELEASE TRANSDERMAL at 09:00

## 2025-01-02 RX ADMIN — Medication 12.5 MG: at 21:11

## 2025-01-02 RX ADMIN — PANTOPRAZOLE SODIUM 40 MG: 40 TABLET, DELAYED RELEASE ORAL at 05:35

## 2025-01-02 RX ADMIN — ACETAMINOPHEN 975 MG: 325 TABLET, FILM COATED ORAL at 05:35

## 2025-01-02 RX ADMIN — HYDRALAZINE HYDROCHLORIDE 50 MG: 50 TABLET ORAL at 21:11

## 2025-01-02 RX ADMIN — FUROSEMIDE 40 MG: 40 TABLET ORAL at 12:09

## 2025-01-02 RX ADMIN — ACETAMINOPHEN 975 MG: 325 TABLET, FILM COATED ORAL at 14:55

## 2025-01-02 RX ADMIN — PANCRELIPASE 24000 UNITS: 120000; 24000; 76000 CAPSULE, DELAYED RELEASE PELLETS ORAL at 09:04

## 2025-01-02 RX ADMIN — INSULIN LISPRO 1 UNITS: 100 INJECTION, SOLUTION INTRAVENOUS; SUBCUTANEOUS at 12:08

## 2025-01-02 RX ADMIN — Medication 6 MG: at 21:11

## 2025-01-02 RX ADMIN — Medication 12.5 MG: at 09:01

## 2025-01-02 RX ADMIN — CLOPIDOGREL BISULFATE 75 MG: 75 TABLET ORAL at 09:01

## 2025-01-02 RX ADMIN — HEPARIN SODIUM 5000 UNITS: 5000 INJECTION, SOLUTION INTRAVENOUS; SUBCUTANEOUS at 21:10

## 2025-01-02 RX ADMIN — ACETAMINOPHEN 975 MG: 325 TABLET, FILM COATED ORAL at 21:10

## 2025-01-02 RX ADMIN — PANCRELIPASE 24000 UNITS: 120000; 24000; 76000 CAPSULE, DELAYED RELEASE PELLETS ORAL at 12:09

## 2025-01-02 RX ADMIN — PANCRELIPASE 24000 UNITS: 120000; 24000; 76000 CAPSULE, DELAYED RELEASE PELLETS ORAL at 17:51

## 2025-01-02 NOTE — ASSESSMENT & PLAN NOTE
Improving  Oriented x 2, periods of confusion  Multifactorial risk factors:  Hospitalization, insomnia, pain, constipation in the setting of short term memory loss  Maintain delirium precautions:  Provide frequent redirection, reorientation, distraction techniques  Avoid deliriogenic medications such as tramadol, benzodiazepines, anticholinergics,  Benadryl  Treat pain, See geriatric pain protocol  Monitor for constipation and urinary retention  Encourage early and frequent moblization, OOB  Encourage Hydration/ Nutrition  Implement sleep hygiene, limit night time interuptions, group activities

## 2025-01-02 NOTE — ASSESSMENT & PLAN NOTE
Lab Results   Component Value Date    EGFR 29 01/03/2025    EGFR 30 01/02/2025    EGFR 30 01/01/2025    CREATININE 2.05 (H) 01/03/2025    CREATININE 1.99 (H) 01/02/2025    CREATININE 1.99 (H) 01/01/2025   Review of record shows baseline creatinine 1.8 to 2.2 mg/dL   As an outpatient follows up with Dr. 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    Sent office message to arrange for outpatient follow-up ordered lab work upon discharge patient is aware

## 2025-01-02 NOTE — ASSESSMENT & PLAN NOTE
SHOAIB most likely secondary to FELIPE 12/24+ ischemic injury secondary to hemodynamic perturbation intraoperatively in light of underlying comorbidities plus failure to autoregulate) lisinopril with subsequent ATN  After review of records it appears that the patient has a baseline Creatinine of 1.8-2.2 mg/dL.  Admission creatinine of 2.08 mg/dL on 12/24/2024.  Creatinine today is at 2.05 mg/dL stable and at baseline still  Hold off on further IV fluids for now  Clinically euvolemic to minimally hypervolemic  Started on Lasix 40 mg p.o. Tuesday Thursday Saturday from 1/2/2025 continue upon discharge  check BMP, magnesium, phosphorus in a.m. if still in the hospital.  Patient is aware  Place on a renal diet when allowed diet order.   Strict I/O.  Daily weights  Urinary retention protocol  Avoid nephrotoxins, adjust meds to appropriate GFR.  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 Era Wood for discharge from renal standpoint medically cleared  Ordered for lab work 1/13/2025  Message sent to the office for follow-up

## 2025-01-02 NOTE — ASSESSMENT & PLAN NOTE
Clinical Frail Scale: 5- Mildly Frail  Exacerbated by hospitalization, surgery, delirium  Son lives downstairs  Family provides transport  Started on clear liquid  Encourage oob, mobility  IS

## 2025-01-02 NOTE — ASSESSMENT & PLAN NOTE
Exacerbated by hospitalization, surgery, frailty  Ambulates independently at baseline  Fall precautions  PT/OT  Rehab post hospitalization for optimization

## 2025-01-02 NOTE — ASSESSMENT & PLAN NOTE
Wt Readings from Last 3 Encounters:   01/02/25 82.7 kg (182 lb 5.1 oz)   12/24/24 80 kg (176 lb 5.9 oz)   12/18/24 79.4 kg (175 lb)   Cardiology on consult  Continue lasix

## 2025-01-02 NOTE — PROGRESS NOTES
Progress Note - Nephrology   Name: Jay Roach Jr. 79 y.o. male I MRN: [id number]  [facility] I Date of Admission: 12/24/2024   Date of Service: 1/3/2025 I Hospital Day: 10    79-year-old male with multiple comorbidities including CKD stage IV, peripheral arterial disease status post bilateral revascularization, TAVR, CABG, hypertension and bilateral carotid stenosis admitted with abdominal pain CTA suspicious for pneumatosis and mesenteric ischemia taken to the OR for expiratory lap with retrograde open SMA stent. Nephrology consulted for evaluation and management of acute kidney injury.   Assessment & Plan  SHOAIB (acute kidney injury) (HCC)  SHOAIB most likely secondary to FELIPE 12/24+ ischemic injury secondary to hemodynamic perturbation intraoperatively in light of underlying comorbidities plus failure to autoregulate) lisinopril with subsequent ATN  After review of records it appears that the patient has a baseline Creatinine of 1.8-2.2 mg/dL.  Admission creatinine of 2.08 mg/dL on 12/24/2024.  Creatinine today is at 2.05 mg/dL stable and at baseline still  Hold off on further IV fluids for now  Clinically euvolemic to minimally hypervolemic  Started on Lasix 40 mg p.o. Tuesday Thursday Saturday from 1/2/2025 continue upon discharge  check BMP, magnesium, phosphorus in a.m. if still in the hospital.  Patient is aware  Place on a renal diet when allowed diet order.   Strict I/O.  Daily weights  Urinary retention protocol  Avoid nephrotoxins, adjust meds to appropriate GFR.  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 Era Wood for discharge from renal standpoint medically cleared  Ordered for lab work 1/13/2025  Message sent to the office for follow-up  CKD (chronic kidney disease) stage 4, GFR 15-29 ml/min ([facility])  Lab Results   Component Value Date    EGFR 29 01/03/2025    EGFR 30  01/02/2025    EGFR 30 01/01/2025    CREATININE 2.05 (H) 01/03/2025    CREATININE 1.99 (H) 01/02/2025    CREATININE 1.99 (H) 01/01/2025   Review of record shows baseline creatinine 1.8 to 2.2 mg/dL   As an outpatient follows up with Dr. 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    Sent office message to arrange for outpatient follow-up ordered lab work upon discharge patient is aware  Mesenteric ischemia (HCC)  Status post expiratory laparotomy, lysis of adhesions mesenteric angiogram with SMA stenting on 12/24   Follow-up with vascular surgery and general surgery   Chronic diastolic CHF (congestive heart failure) (HCC)  Wt Readings from Last 3 Encounters:   01/03/25 83 kg (182 lb 14.3 oz)   12/24/24 80 kg (176 lb 5.9 oz)   12/18/24 79.4 kg (175 lb)   Started on Lasix 40 mg TTS 1/2/2025  Hold off on further IV fluids for now    Primary hypertension  On Norvasc 10 mg p.o. daily, hydralazine 50 mg p.o. twice daily, Metroprolol 12.5 mg p.o. every 12   Hold off on home lisinopril upon discharge can be restarted once stable as an outpatient    I have reviewed the nephrology recommendations including continue Lasix upon discharge hold lisinopril upon discharge stable for discharge from renal standpoint medically cleared, with surgical team, and we are in agreement with renal plan including the information outlined above.     Subjective   Brief History of Admission -     Patient seen and examined in his room urine output to 25 cc plus afebrile blood pressure stable hopeful for discharge later today no overt complaints happy renal parameters are stable    Objective :  Temp:  [98 °F (36.7 °C)-98.4 °F (36.9 °C)] 98.2 °F (36.8 °C)  HR:  [52-67] 59  BP: (134-158)/(46-81) 158/48  Resp:  [16-18] 18  SpO2:  [91 %-98 %] 94 %  O2 Device: None (Room air)    Current Weight: Weight - Scale: 83 kg (182 lb 14.3 oz)  First Weight: Weight - Scale: 77.8  kg (171 lb 8.3 oz)  I/O         12/31 0701  01/01 0700 01/01 0701  01/02 0700 01/02 0701  01/03 0700    P.O. 0 420 600    I.V. (mL/kg)       Total Intake(mL/kg) 0 (0) 420 (5.1) 600 (7.3)    Urine (mL/kg/hr) 1125 (0.6) 875 (0.4)     Emesis/NG output 0      Stool 0 0     Total Output 1125 875     Net -1125 -455 +600           Unmeasured Urine Occurrence 1 x      Unmeasured Stool Occurrence 1 x 1 x           Physical Exam  Vitals and nursing note reviewed.   Constitutional:       General: He is not in acute distress.     Appearance: Normal appearance. He is normal weight. He is not ill-appearing, toxic-appearing or diaphoretic.   HENT:      Head: Normocephalic and atraumatic.      Mouth/Throat:      Pharynx: No oropharyngeal exudate.   Eyes:      General: No scleral icterus.  Cardiovascular:      Rate and Rhythm: Normal rate.   Pulmonary:      Effort: No respiratory distress.      Breath sounds: No wheezing.   Abdominal:      General: There is no distension.      Palpations: Abdomen is soft.      Tenderness: There is no abdominal tenderness.   Musculoskeletal:         General: Swelling present.      Cervical back: No rigidity.      Comments: Trace bilateral lower extremity edema   Skin:     Coloration: Skin is not jaundiced.   Neurological:      General: No focal deficit present.      Mental Status: He is alert. Mental status is at baseline.       ROS:  Constitutional:  No chills, no fever.   HENT:  No sore throat  Respiratory:  No cough, no hemoptysis, no wheezing.    Cardiovascular:  no leg swelling.   Gastrointestinal:  No constipation, no diarrhea.   Genitourinary:  No dysuria and hematuria. No decreased urine output.  Musculoskeletal:  No back pain.   Neurological:  no dizziness, No headaches.   Psychiatric/Behavioral:  No agitation, no confusion.    All other systems reviewed and are negative.        Medications:    Current Facility-Administered Medications:     acetaminophen (TYLENOL) tablet 975 mg, 975 mg,  Oral, Q8H TRENT, Roldan Wilkes MD, 975 mg at 01/03/25 0605    amLODIPine (NORVASC) tablet 10 mg, 10 mg, Oral, Daily, Renee Wooten PA-C, 10 mg at 01/03/25 0834    atorvastatin (LIPITOR) tablet 80 mg, 80 mg, Oral, HS, Wilfrido Billingsley MD, 80 mg at 01/02/25 2110    calcium carbonate (TUMS) chewable tablet 500 mg, 500 mg, Oral, Daily PRN, Wilfrido Billingsley MD    clopidogrel (PLAVIX) tablet 75 mg, 75 mg, Oral, Daily, Renee Wooten PA-C, 75 mg at 01/03/25 0834    furosemide (LASIX) tablet 40 mg, 40 mg, Oral, Once per day on Tuesday Thursday Saturday, Allison Dickinson MD, 40 mg at 01/02/25 1209    heparin (porcine) subcutaneous injection 5,000 Units, 5,000 Units, Subcutaneous, Q8H TRENT, Simón Cee MD, 5,000 Units at 01/03/25 0605    hydrALAZINE (APRESOLINE) injection 5 mg, 5 mg, Intravenous, Q4H PRN, Roldan Wilkes MD, 5 mg at 12/31/24 0447    hydrALAZINE (APRESOLINE) tablet 50 mg, 50 mg, Oral, BID, Allison Dickinson MD, 50 mg at 01/03/25 0834    HYDROmorphone (DILAUDID) injection 0.5 mg, 0.5 mg, Intravenous, Q3H PRN, Simón Cee MD    HYDROmorphone HCl (DILAUDID) injection 0.2 mg, 0.2 mg, Intravenous, Q2H PRN, Roldan Wilkes MD, 0.2 mg at 12/30/24 0148    insulin lispro (HumALOG/ADMELOG) 100 units/mL subcutaneous injection 1-5 Units, 1-5 Units, Subcutaneous, TID With Meals, 1 Units at 01/02/25 1208 **AND** [PENDING] insulin lispro (HumALOG/ADMELOG) 100 units/mL subcutaneous injection 1-5 Units, 1-5 Units, Subcutaneous, 4x Daily (AC & HS) **AND** Fingerstick Glucose (POCT), , , TID **AND** [PENDING] Fingerstick Glucose (POCT), , , BID, Roldan Wilkes MD    labetalol (NORMODYNE) injection 10 mg, 10 mg, Intravenous, Q3H PRN, Jb Samuel MD, 10 mg at 12/31/24 0827    melatonin tablet 6 mg, 6 mg, Oral, HS, Tuesday M ZHANNA Sosa, 6 mg at 01/02/25 2111    metoclopramide (REGLAN) injection 10 mg, 10 mg, Intravenous, Q6H PRN, Evelyn Appiah MD, 10 mg at 12/28/24 1123    metoprolol tartrate (LOPRESSOR) partial tablet 12.5 mg,  12.5 mg, Oral, Q12H TRENT, Renee Wooten PA-C, 12.5 mg at 01/03/25 0834    nicotine (NICODERM CQ) 14 mg/24hr TD 24 hr patch 1 patch, 1 patch, Transdermal, Daily, Jb Samuel MD, 1 patch at 01/03/25 0835    ondansetron (ZOFRAN) injection 4 mg, 4 mg, Intravenous, Q4H PRN, Simón Cee MD, 4 mg at 12/29/24 1627    oxyCODONE (ROXICODONE) split tablet 2.5 mg, 2.5 mg, Oral, Q4H PRN, 2.5 mg at 12/29/24 0227 **OR** oxyCODONE (ROXICODONE) IR tablet 5 mg, 5 mg, Oral, Q4H PRN, Roldan Wilkes MD    pancrelipase (Lip-Prot-Amyl) (CREON) delayed release capsule 24,000 Units, 24,000 Units, Oral, TID With Meals, Evelyn Appiah MD, 24,000 Units at 01/03/25 0633    pantoprazole (PROTONIX) EC tablet 40 mg, 40 mg, Oral, Early Morning, Renee Wooten PA-C, 40 mg at 01/03/25 0605    [Held by provider] polyethylene glycol (MIRALAX) packet 17 g, 17 g, Oral, Daily, Evelyn Appiah MD, 17 g at 12/29/24 0800    [Held by provider] senna-docusate sodium (SENOKOT S) 8.6-50 mg per tablet 1 tablet, 1 tablet, Oral, HS, Roldan Wilkes MD, 1 tablet at 12/28/24 2112    simethicone (MYLICON) chewable tablet 80 mg, 80 mg, Oral, Q6H PRN, Wilfrido Billingsley MD, 80 mg at 12/29/24 1627      Lab Results: I have reviewed the following results:  Results from last 7 days   Lab Units 01/03/25  0522 01/02/25  0404 01/01/25  0249 12/31/24  0049 12/30/24  1426 12/30/24  0328 12/29/24  0338 12/28/24  1222   WBC Thousand/uL  --  5.49 4.04* 4.46  --  3.99*  --  7.42   HEMOGLOBIN g/dL  --  8.6* 7.8* 8.6* 8.0* 7.5*  --  8.9*   HEMATOCRIT %  --  27.0* 24.6* 26.2* 24.4* 23.4*  --  28.1*   PLATELETS Thousands/uL  --  217 183 157  --  135*  --  155   POTASSIUM mmol/L 3.5 3.5 3.8 4.0  --  3.7 3.5 3.7   CHLORIDE mmol/L 110* 109* 112* 109*  --  109* 106 106   CO2 mmol/L 25 23 24 25  --  24 24 25   BUN mg/dL 43* 45* 47* 52*  --  55* 54* 48*   CREATININE mg/dL 2.05* 1.99* 1.99* 2.33*  --  2.46* 2.78* 2.59*   CALCIUM mg/dL 7.7* 8.3* 8.5 8.6  --  8.3* 8.1* 8.3*   MAGNESIUM mg/dL   "--   --  2.1 2.1  --  2.0 2.0  --    PHOSPHORUS mg/dL 2.5  --   --  2.7  --  2.3 3.2  --        Administrative Statements     Portions of the record may have been created with voice recognition software. Occasional wrong word or \"sound a like\" substitutions may have occurred due to the inherent limitations of voice recognition software. Read the chart carefully and recognize, using context, where substitutions have occurred.If you have any questions, please contact the dictating provider.  "

## 2025-01-02 NOTE — ASSESSMENT & PLAN NOTE
Related to hospitalization  Pt reports he sleeps well at home  First line is behavioral therapy  Avoid sedative hypnotics such as benzodiazepines and benadryl  Encourage staying awake during the day  Encourage daytime activity, morning exercise  Decrease or eliminate day time naps  Avoid caffeine especially during late afternoon and evening hours  Establish a night time routine  Recommend melatonin 3mg po QHS

## 2025-01-02 NOTE — ASSESSMENT & PLAN NOTE
Wt Readings from Last 3 Encounters:   01/02/25 82.7 kg (182 lb 5.1 oz)   12/24/24 80 kg (176 lb 5.9 oz)   12/18/24 79.4 kg (175 lb)

## 2025-01-02 NOTE — PLAN OF CARE
Problem: PAIN - ADULT  Goal: Verbalizes/displays adequate comfort level or baseline comfort level  Description: Interventions:  - Encourage patient to monitor pain and request assistance  - Assess pain using appropriate pain scale  - Administer analgesics based on type and severity of pain and evaluate response  - Implement non-pharmacological measures as appropriate and evaluate response  - Consider cultural and social influences on pain and pain management  - Notify physician/advanced practitioner if interventions unsuccessful or patient reports new pain  Outcome: Progressing     Problem: INFECTION - ADULT  Goal: Absence or prevention of progression during hospitalization  Description: INTERVENTIONS:  - Assess and monitor for signs and symptoms of infection  - Monitor lab/diagnostic results  - Monitor all insertion sites, i.e. indwelling lines, tubes, and drains  - Monitor endotracheal if appropriate and nasal secretions for changes in amount and color  - Tad appropriate cooling/warming therapies per order  - Administer medications as ordered  - Instruct and encourage patient and family to use good hand hygiene technique  - Identify and instruct in appropriate isolation precautions for identified infection/condition  Outcome: Progressing     Problem: SAFETY ADULT  Goal: Patient will remain free of falls  Description: INTERVENTIONS:  - Educate patient/family on patient safety including physical limitations  - Instruct patient to call for assistance with activity   - Consult OT/PT to assist with strengthening/mobility   - Keep Call bell within reach  - Keep bed low and locked with side rails adjusted as appropriate  - Keep care items and personal belongings within reach  - Initiate and maintain comfort rounds  - Make Fall Risk Sign visible to staff  - Offer Toileting every  Hours, in advance of need  - Initiate/Maintain alarm  - Obtain necessary fall risk management equipment  - Apply yellow socks and  bracelet for high fall risk patients  - Consider moving patient to room near nurses station  Outcome: Progressing     Problem: DISCHARGE PLANNING  Goal: Discharge to home or other facility with appropriate resources  Description: INTERVENTIONS:  - Identify barriers to discharge w/patient and caregiver  - Arrange for needed discharge resources and transportation as appropriate  - Identify discharge learning needs (meds, wound care, etc.)  - Arrange for interpretive services to assist at discharge as needed  - Refer to Case Management Department for coordinating discharge planning if the patient needs post-hospital services based on physician/advanced practitioner order or complex needs related to functional status, cognitive ability, or social support system  Outcome: Progressing     Problem: Knowledge Deficit  Goal: Patient/family/caregiver demonstrates understanding of disease process, treatment plan, medications, and discharge instructions  Description: Complete learning assessment and assess knowledge base.  Interventions:  - Provide teaching at level of understanding  - Provide teaching via preferred learning methods  Outcome: Progressing     Problem: CARDIOVASCULAR - ADULT  Goal: Maintains optimal cardiac output and hemodynamic stability  Description: INTERVENTIONS:  - Monitor I/O, vital signs and rhythm  - Monitor for S/S and trends of decreased cardiac output  - Administer and titrate ordered vasoactive medications to optimize hemodynamic stability  - Assess quality of pulses, skin color and temperature  - Assess for signs of decreased coronary artery perfusion  - Instruct patient to report change in severity of symptoms  Outcome: Progressing  Goal: Absence of cardiac dysrhythmias or at baseline rhythm  Description: INTERVENTIONS:  - Continuous cardiac monitoring, vital signs, obtain 12 lead EKG if ordered  - Administer antiarrhythmic and heart rate control medications as ordered  - Monitor electrolytes and  administer replacement therapy as ordered  Outcome: Progressing     Problem: RESPIRATORY - ADULT  Goal: Achieves optimal ventilation and oxygenation  Description: INTERVENTIONS:  - Assess for changes in respiratory status  - Assess for changes in mentation and behavior  - Position to facilitate oxygenation and minimize respiratory effort  - Oxygen administered by appropriate delivery if ordered  - Initiate smoking cessation education as indicated  - Encourage broncho-pulmonary hygiene including cough, deep breathe, Incentive Spirometry  - Assess the need for suctioning and aspirate as needed  - Assess and instruct to report SOB or any respiratory difficulty  - Respiratory Therapy support as indicated  Outcome: Progressing     Problem: GASTROINTESTINAL - ADULT  Goal: Minimal or absence of nausea and/or vomiting  Description: INTERVENTIONS:  - Administer IV fluids if ordered to ensure adequate hydration  - Maintain NPO status until nausea and vomiting are resolved  - Nasogastric tube if ordered  - Administer ordered antiemetic medications as needed  - Provide nonpharmacologic comfort measures as appropriate  - Advance diet as tolerated, if ordered  - Consider nutrition services referral to assist patient with adequate nutrition and appropriate food choices  Outcome: Progressing  Goal: Maintains or returns to baseline bowel function  Description: INTERVENTIONS:  - Assess bowel function  - Encourage oral fluids to ensure adequate hydration  - Administer IV fluids if ordered to ensure adequate hydration  - Administer ordered medications as needed  - Encourage mobilization and activity  - Consider nutritional services referral to assist patient with adequate nutrition and appropriate food choices  Outcome: Progressing  Goal: Maintains adequate nutritional intake  Description: INTERVENTIONS:  - Monitor percentage of each meal consumed  - Identify factors contributing to decreased intake, treat as appropriate  - Assist with  meals as needed  - Monitor I&O, weight, and lab values if indicated  - Obtain nutrition services referral as needed  Outcome: Progressing     Problem: GENITOURINARY - ADULT  Goal: Maintains or returns to baseline urinary function  Description: INTERVENTIONS:  - Assess urinary function  - Encourage oral fluids to ensure adequate hydration if ordered  - Administer IV fluids as ordered to ensure adequate hydration  - Administer ordered medications as needed  - Offer frequent toileting  - Follow urinary retention protocol if ordered  Outcome: Progressing     Problem: METABOLIC, FLUID AND ELECTROLYTES - ADULT  Goal: Electrolytes maintained within normal limits  Description: INTERVENTIONS:  - Monitor labs and assess patient for signs and symptoms of electrolyte imbalances  - Administer electrolyte replacement as ordered  - Monitor response to electrolyte replacements, including repeat lab results as appropriate  - Instruct patient on fluid and nutrition as appropriate  Outcome: Progressing  Goal: Fluid balance maintained  Description: INTERVENTIONS:  - Monitor labs   - Monitor I/O and WT  - Instruct patient on fluid and nutrition as appropriate  - Assess for signs & symptoms of volume excess or deficit  Outcome: Progressing     Problem: HEMATOLOGIC - ADULT  Goal: Maintains hematologic stability  Description: INTERVENTIONS  - Assess for signs and symptoms of bleeding or hemorrhage  - Monitor labs  - Administer supportive blood products/factors as ordered and appropriate  Outcome: Progressing     Problem: Prexisting or High Potential for Compromised Skin Integrity  Goal: Skin integrity is maintained or improved  Description: INTERVENTIONS:  - Identify patients at risk for skin breakdown  - Assess and monitor skin integrity  - Assess and monitor nutrition and hydration status  - Monitor labs   - Assess for incontinence   - Turn and reposition patient  - Assist with mobility/ambulation  - Relieve pressure over bony  prominences  - Avoid friction and shearing  - Provide appropriate hygiene as needed including keeping skin clean and dry  - Evaluate need for skin moisturizer/barrier cream  - Collaborate with interdisciplinary team   - Patient/family teaching  - Consider wound care consult   Outcome: Progressing     Problem: Nutrition/Hydration-ADULT  Goal: Nutrient/Hydration intake appropriate for improving, restoring or maintaining nutritional needs  Description: Monitor and assess patient's nutrition/hydration status for malnutrition. Collaborate with interdisciplinary team and initiate plan and interventions as ordered.  Monitor patient's weight and dietary intake as ordered or per policy. Utilize nutrition screening tool and intervene as necessary. Determine patient's food preferences and provide high-protein, high-caloric foods as appropriate.     INTERVENTIONS:  - Monitor oral intake, urinary output, labs, and treatment plans  - Assess nutrition and hydration status and recommend course of action  - Evaluate amount of meals eaten  - Assist patient with eating if necessary   - Allow adequate time for meals  - Recommend/ encourage appropriate diets, oral nutritional supplements, and vitamin/mineral supplements  - Order, calculate, and assess calorie counts as needed  - Recommend, monitor, and adjust tube feedings and TPN/PPN based on assessed needs  - Assess need for intravenous fluids  - Provide specific nutrition/hydration education as appropriate  - Include patient/family/caregiver in decisions related to nutrition  Outcome: Progressing

## 2025-01-02 NOTE — PROGRESS NOTES
Progress Note - Surgery-General   Name: Jay Roach Jr. 79 y.o. male I MRN: [id number]  [facility] Date of Admission: 12/24/2024   Date of Service: 1/2/2025 I Hospital Day: 9    Assessment & Plan  Mesenteric ischemia (HCC)  78 yo male now s/p 12/24 Ex lap, ADA, mesenteric agram, retrograde open mesenteric stent    Afebrile, hypertensive to the 160s systolics, currently on 3 L nasal cannula  Urine output 875 cc  Stool 1 time    WBC 5.4 from 4.0  Hemoglobin 8.6 from 7.8  Creatinine 1.99 from 1.99    Plan  Advance to a regular diet  No IV fluids and no Lasix per nephro  PRN pain meds and anti nausea meds  DVT prophylaxis  Appreciate vascular recs  Appreciate cardiology recs  Appreciate nephro recs  Continue Plavix  Anticipate discharge within next 24 to 48 hours  Chronic diastolic CHF (congestive heart failure) ([facility])  Wt Readings from Last 3 Encounters:   01/02/25 82.7 kg (182 lb 5.1 oz)   12/24/24 80 kg (176 lb 5.9 oz)   12/18/24 79.4 kg (175 lb)             CKD (chronic kidney disease) stage 4, GFR 15-29 ml/min ([facility])  Lab Results   Component Value Date    EGFR 30 01/02/2025    EGFR 30 01/01/2025    EGFR 25 12/31/2024    CREATININE 1.99 (H) 01/02/2025    CREATININE 1.99 (H) 01/01/2025    CREATININE 2.33 (H) 12/31/2024     SHOAIB (acute kidney injury) ([facility])    Primary hypertension          Subjective   No acute events overnight.  Patient endorses mild amount of abdominal pain.  Denies having nausea, vomiting, fevers, chills, chest pain, shortness of breath.  Tolerating oral intake.  Voiding without difficulty.  Passing flatus.  No bowel movements.    Objective :  Temp:  [97.8 °F (36.6 °C)-98.2 °F (36.8 °C)] 98 °F (36.7 °C)  HR:  [50-66] 60  BP: (145-160)/(43-51) 145/43  Resp:  [17-18] 18  SpO2:  [94 %-98 %] 97 %  O2 Device: Nasal cannula    I/O         12/31 0701 01/01 0700 01/01 0701 01/02 0700 01/02 0701 01/03 0700    P.O. 0 420     I.V. (mL/kg)       Total Intake(mL/kg) 0 (0) 420 (5.1)     Urine  (mL/kg/hr) 1125 (0.6) 875 (0.4)     Emesis/NG output 0      Stool 0 0     Total Output 1125 875     Net -1128 -608            Unmeasured Urine Occurrence 1 x      Unmeasured Stool Occurrence 1 x 1 x             Physical Exam    Physical Exam:  General: No acute distress, alert and oriented  Neuro: alert, oriented x3  HENT: PERRL, EOMI  CV: Well perfused, regular rate and rhythm  Lungs: Normal work of breathing, no increased respiratory effort  Abdomen: Soft, nontender, nondistended.  Incisions are clean/dry/intact.  MSK/Extremities: No edema, clubbing or cyanosis  Skin: Warm, dry      Lab Results: I have reviewed the following results:  Recent Labs     12/31/24  0049 01/01/25  0249 01/02/25  0404   WBC 4.46 4.04* 5.49   HGB 8.6* 7.8* 8.6*   HCT 26.2* 24.6* 27.0*    183 217   SODIUM 144 145 141   K 4.0 3.8 3.5   * 112* 109*   CO2 25 24 23   BUN 52* 47* 45*   CREATININE 2.33* 1.99* 1.99*   GLUC 114 97 102   MG 2.1 2.1  --    PHOS 2.7  --   --          VTE Pharmacologic Prophylaxis: Heparin  VTE Mechanical Prophylaxis: sequential compression device

## 2025-01-02 NOTE — ASSESSMENT & PLAN NOTE
Wt Readings from Last 3 Encounters:   01/03/25 83 kg (182 lb 14.3 oz)   12/24/24 80 kg (176 lb 5.9 oz)   12/18/24 79.4 kg (175 lb)   Started on Lasix 40 mg TTS 1/2/2025  Hold off on further IV fluids for now

## 2025-01-02 NOTE — ASSESSMENT & PLAN NOTE
Improved, appears to be at baseline  Cr 1.99  Follows with nephrology as outpatient, last OV 11/6/24

## 2025-01-02 NOTE — ASSESSMENT & PLAN NOTE
Per hx   Mini cog 3/5 (3/24/23)  Recommend check TSH and Vitamin B 12 level as outpatient for reversible cause of memory loss  MRI brain (9/13/24) mild chronic microangiopathic changes  maintain neuro protective lifestyle :   Keep physically, mentally and socially active   Increase physical exercise   Recommend Mediterranean/MIND diet   Monitor good control of blood pressure, blood sugar and cholestrerol

## 2025-01-02 NOTE — CONSULTS
Consultation - Geriatric Medicine   Name: Jay Roach Jr. 79 y.o. male I MRN: [id number]  Unit/Bed#: [facility] 530-01 I Date of Admission: 12/24/2024   Date of Service: 1/2/2025 I Hospital Day: 9   Inpatient consult to Gerontology  Consult performed by: ZHANNA London  Consult ordered by: Wilfrido Billingsley MD        Physician Requesting Evaluation: Bettie BOLDEN To,    Reason for Evaluation / Principal Problem: delirium    Assessment & Plan  Mesenteric ischemia (HCC)  S/p ex lap, ADA, mesenteric agram, retrograde open mesenteric stent  Surgery on consult  Encephalopathy  Improving  Oriented x 2, periods of confusion  Multifactorial risk factors:  Hospitalization, insomnia, pain, constipation in the setting of short term memory loss  Maintain delirium precautions:  Provide frequent redirection, reorientation, distraction techniques  Avoid deliriogenic medications such as tramadol, benzodiazepines, anticholinergics,  Benadryl  Treat pain, See geriatric pain protocol  Monitor for constipation and urinary retention  Encourage early and frequent moblization, OOB  Encourage Hydration/ Nutrition  Implement sleep hygiene, limit night time interuptions, group activities  Insomnia  Related to hospitalization  Pt reports he sleeps well at home  First line is behavioral therapy  Avoid sedative hypnotics such as benzodiazepines and benadryl  Encourage staying awake during the day  Encourage daytime activity, morning exercise  Decrease or eliminate day time naps  Avoid caffeine especially during late afternoon and evening hours  Establish a night time routine  Recommend melatonin 3mg po QHS   Short-term memory loss  Per hx   Mini cog 3/5 (3/24/23)  Recommend check TSH and Vitamin B 12 level as outpatient for reversible cause of memory loss  MRI brain (9/13/24) mild chronic microangiopathic changes  maintain neuro protective lifestyle :   Keep physically, mentally and socially active   Increase physical exercise   Recommend  Mediterranean/MIND diet   Monitor good control of blood pressure, blood sugar and cholestrerol  CKD (chronic kidney disease) stage 4, GFR 15-29 ml/min ([facility])  Lab Results   Component Value Date    EGFR 30 01/02/2025    EGFR 30 01/01/2025    EGFR 25 12/31/2024    CREATININE 1.99 (H) 01/02/2025    CREATININE 1.99 (H) 01/01/2025    CREATININE 2.33 (H) 12/31/2024     Chronic diastolic CHF (congestive heart failure) ([facility])  Wt Readings from Last 3 Encounters:   01/02/25 82.7 kg (182 lb 5.1 oz)   12/24/24 80 kg (176 lb 5.9 oz)   12/18/24 79.4 kg (175 lb)   Cardiology on consult  Continue lasix  SHOAIB (acute kidney injury) ([facility])  Improved, appears to be at baseline  Cr 1.99  Follows with nephrology as outpatient, last OV 11/6/24  Frailty syndrome in geriatric patient  Clinical Frail Scale: 5- Mildly Frail  Exacerbated by hospitalization, surgery, delirium  Son lives downstairs  Family provides transport  Started on clear liquid  Encourage oob, mobility  IS  Ambulatory dysfunction  Exacerbated by hospitalization, surgery, frailty  Ambulates independently at baseline  Fall precautions  PT/OT  Rehab post hospitalization for optimization      History of Present Illness   Hx and PE limited by: NA  HPI: Jay Roach Jr. is a 79 y.o. year old male who presents with pain to [facility]  abdomen x 1 week. CT abd was suspicious for mesenteric ischemia. He is transferred to [facility] for further management.     He has HTN, Constipation, DM, PAD, CKD, lung cancer, hx of appendectomy with right hemicoloectomy, Hx of GI bleed x 3 with clips    Prior to arrival he lives at home alone. His son lives downstairs. His family provides transport. He is independent with IADLs and ADLs. He has a roller walker, he reports not using it. He wears glasses.     Review of Systems   Constitutional:  Negative for unexpected weight change.   HENT:  Negative for hearing loss.    Eyes:  Negative for visual disturbance.   Respiratory:   Negative for cough.    Cardiovascular:  Negative for chest pain.   Gastrointestinal:  Positive for constipation.   Genitourinary:  Negative for difficulty urinating.   Musculoskeletal:  Positive for gait problem.   Skin:  Negative for color change.   Psychiatric/Behavioral:  Positive for sleep disturbance.          Meds/Allergies   Home medication review  Mail order  Allopurinol 100 mg po daily  Creon TID   Plavix 75 mg po daily  Jardiance 10 mg po daily, last refill 11/6/24 # 90 tablets (pt reports not taking)  Amaryl 1 mg po daily, last refill 9/24/24 # 90 days  Zestril 5 mg po daily, last refill 10/18/ 24 # 90 days  Hydralazine 25 mg po BID, last refill 12/18/24 # 90 days  Metoprolol succinate 12.5 mg po, last refill 10/25/24 # 90 days  Pantoprazole 40 mg po BID, last refill 10/29/24 # 90 days  Amlodipine 10 mg po daily, last refill 8/25/24 # 90 days  Calcitriol 0.25 mg po daily, last refill 8/25/254 # 90 days        Objective :  Temp:  [97.8 °F (36.6 °C)-98.2 °F (36.8 °C)] 98 °F (36.7 °C)  HR:  [50-66] 60  BP: (145-160)/(43-51) 145/43  Resp:  [17-18] 18  SpO2:  [94 %-98 %] 97 %  O2 Device: Nasal cannula    Physical Exam  Vitals and nursing note reviewed.   HENT:      Head: Normocephalic.      Nose: No congestion.      Mouth/Throat:      Mouth: Mucous membranes are moist.   Eyes:      General:         Right eye: No discharge.         Left eye: No discharge.   Cardiovascular:      Rate and Rhythm: Normal rate. Rhythm irregular.      Pulses: Normal pulses.   Pulmonary:      Effort: Pulmonary effort is normal.      Breath sounds: Normal breath sounds.   Abdominal:      General: Bowel sounds are normal. There is distension.   Musculoskeletal:         General: Normal range of motion.      Cervical back: Normal range of motion.   Skin:     General: Skin is warm and dry.   Neurological:      Mental Status: He is alert. He is disoriented.   Psychiatric:         Mood and Affect: Mood normal.         Lab Results: I have  reviewed the following results:CBC/BMP:   .     01/02/25  0404   WBC 5.49   HGB 8.6*   HCT 27.0*      SODIUM 141   K 3.5   *   CO2 23   BUN 45*   CREATININE 1.99*   GLUC 102        Imaging Results Review: I reviewed radiology reports from this admission including: MRI brain.  Other Study Results Review: EKG was reviewed.     Therapies:   Basic Mobility Inpatient Raw Score: 18  -[facility] Goal: 6: Walk 10 steps or more  -HL Achieved: 6: Walk 10 steps or more      VTE Prophylaxis: Sequential compression device (Venodyne)     Code Status: Level 1 - Full Code  Advance Directive and Living Will: Yes        Family and Social Support: son  No data recorded      I have spent a total time of 90 minutes in caring for this patient on the day of the visit/encounter including Diagnostic results, Prognosis, Risks and benefits of tx options, Instructions for management, Patient and family education, Importance of tx compliance, Risk factor reductions, Impressions, Counseling / Coordination of care, Documenting in the medical record, Reviewing / ordering tests, medicine, procedures  , Obtaining or reviewing history  , and Communicating with other healthcare professionals .

## 2025-01-02 NOTE — TELEPHONE ENCOUNTER
----- Message from Isabella Wu MD sent at 1/1/2025  1:01 PM EST -----  Patient follows up Dr. Riggins at [facility] cardiology [place] office.  Patient needs cardiology follow-up in 4 weeks after discharge from hospital for CAIN standing.   Thanks,

## 2025-01-02 NOTE — ASSESSMENT & PLAN NOTE
Lab Results   Component Value Date    EGFR 30 01/02/2025    EGFR 30 01/01/2025    EGFR 25 12/31/2024    CREATININE 1.99 (H) 01/02/2025    CREATININE 1.99 (H) 01/01/2025    CREATININE 2.33 (H) 12/31/2024

## 2025-01-02 NOTE — ASSESSMENT & PLAN NOTE
80 yo male now s/p 12/24 Ex lap, ADA, mesenteric agram, retrograde open mesenteric stent    Afebrile, hypertensive to the 160s systolics, currently on 3 L nasal cannula  Urine output 875 cc  Stool 1 time    WBC 5.4 from 4.0  Hemoglobin 8.6 from 7.8  Creatinine 1.99 from 1.99    Plan  Advance to a regular diet  No IV fluids and no Lasix per nephro  PRN pain meds and anti nausea meds  DVT prophylaxis  Appreciate vascular recs  Appreciate cardiology recs  Appreciate nephro recs  Continue Plavix  Anticipate discharge within next 24 to 48 hours

## 2025-01-02 NOTE — DISCHARGE INSTR - AVS FIRST PAGE
Nephrology discharge recommendations:  - To follow up on your kidney function please get blood work upon discharge on or around 1/13/25  - You will receive a copy of the order form for the blood work in the mail from the office.  - Nephrology office will contact you to set up a follow-up appointment in a few days/weeks.   - In the interim if any issues from a kidney standpoint please contact the office at [phone].  Acute Kidney Injury (SHOAIB)  You have been diagnosed with Acute Kidney Injury (SHOAIB). The following information has been developed to provide you with information about SHOAIB and treatment.    What is Acute Kidney Injury (SHOAIB)?   SHOAIB occurs when kidney function decreases over a short period of time. This condition causes a buildup of waste products in the blood and can cause fluid to build up causing swelling in the legs and shortness of breath.  Sometimes called Acute Kidney Failure or Acute Renal Failure, SHOAIB is often reversible if it is found and treated quickly.    How do you know if you have SHOAIB?  SHOAIB is diagnosed by assessing kidney function. This is done by obtaining a blood test to measure the blood level of creatinine. Decreased urine output can sometimes also indicate SHOAIB.    Who is at risk for SHOAIB?  SHOAIB can happen to anyone but usually happens to people who are already sick and may be in the hospital. People are at higher risk for SHOAIB if they have any of the following:  age 65 years or older  high or low blood pressure  underlying kidney disease (e.g., Chronic Kidney Disease (CKD)  peripheral vascular disease (hardening of arteries)  chronic diseases such as liver disease, heart disease and diabetes  a single kidney    What are the symptoms of SHOAIB?   You may or may not have the symptoms to suggest you have kidney injury until the SHOAIB has progressed. Some of the symptoms are listed below:  Not making enough urine  Increased swelling in legs   Feeling tired  Trouble breathing or shortness of  breath  Nausea  New or worsening confusion    What causes SHOAIB?  The causes are divided into three categories:  Not enough blood flowing to the kidneys (e.g., low blood pressure, bleeding, diarrhea, dehydration)  Injury directly to the kidneys (e.g., blood clots, severe infections such as sepsis, medicine toxicity, IV contrast dye used for cardiac catheterization or CT scans)  Blockage to the tubes (ureters) that drain the urine from the kidneys (e.g., enlarged prostate, kidney stones, blood clots)    What is the treatment for SHOAIB?  The treatment for SHOAIB depends on correcting what caused it. Treatment usually involves removing the cause and measures to prevent further injury to the kidneys. This may require the use of intravenous fluids or medications and/or temporary dialysis.    Dialysis is a process using a machine that does the job of the kidneys to remove waste and help correct the electrolyte and fluid balance while the kidneys are recovering.  If dialysis is needed to treat SHOAIB, the doctor will assess daily to see if the kidneys are showing signs of recovery. The daily assessments determine how long dialysis needs to continue.    Depending on the cause and the extent of damage, an episode of SHOAIB may resolve in a few days to several weeks to several months.      What are the long term effects of having an episode of SHOAIB?  People who have one episode of SHOAIB are at an increased risk of having another episode of SHOAIB as well as other health problems such as kidney disease, stroke, and heart disease.  In a small number of people who had unrecognized kidney disease, an SHOAIB episode may result in Chronic Kidney Disease (CKD) which requires lifelong monitoring and treatment.    How do you prevent future episodes of SOHAIB?  Make sure to follow up with the kidney doctor after hospital discharge and obtain blood work to reassess and monitor kidney function.  If you have diabetes, keep your blood sugar in goal range and keep  appointments with your diabetes specialist.   If you have high blood pressure, have your blood pressure checked regularly to make sure it is in target range  If you take blood pressure medicine called ACEIs or ARBs (e.g., Lisinopril, Enalapril, Diovan, Losartan), your doctor may tell you to skip a dose or two if you have severe dehydration and your blood pressure is running low.  Avoid using medicines such as NSAIDs (Nonsteroidal Anti-inflammatory Drugs) and Mallory-2 Inhibitors (a type of NSAID) that may be harmful to kidney function. These may include the medicines listed in the table that follows.        Examples of NSAIDS and Mallory-2 Inhibitors   Talk to your doctor or healthcare provider before stopping any medicine ordered for you.   Celecoxib (CELEBREX) Ketoprofen (ORUDIS, ORUVAIL)   Diclofenac (VOLTAREN, CATAFLAM) Ketorolac (TORADOL)   Diflunisal (DOLOBID) Meloxicam (MOBIC)   Etodolac (LODINE) Nabumetone (RELAFEN)   Fenoprofen (NALFON) Naproxen (ALEVE, NAPROSYN,    NAPRELAN, ANAPROX)   Flurbiprofen (ANSAID) Oxaprozin (DAYPRO)   Ibuprofen (MOTRIN, ADVIL) Piroxicam (FELDENE)   Indomethacin (INDOCIN) Sulindac (CLINORIL)    Tolmetin (TOLETIN)     Is there a special diet for people with SHOAIB?   People with SHOAIB and/or other kidney disease often have high potassium and phosphorus levels in their blood. To protect the kidneys from further injury and to avoid complications, most doctors recommend following a healthy diet choosing foods low potassium and low phosphorus.   Limiting dietary potassium to 2.5 grams/day and phosphorus to 800 milligrams/day is recommended.   A dietitian can help you with learning more about this type of diet.   The tables on the following page may help you to choose lower potassium and phosphorus foods.    The following websites are also good sources of information:  https://www.kidney.org/nutrition/Kidney-Disease-Stages-1-4    https://www.PureSign[employer]ine.com/nutrition/foods-to-avoid-with-kidney-disease  https://www.niddk.nih.gov/health-information/kidney-disease/chronic-kidney-disease-ckd/eating-nutrition  https://[employer].[facility]/health/articles/45664-ijfyc-ohqs-wubmsz  https://www.[employer]/f-ht-z-guides/diet-and-chronic-kidney-disease    If you have any questions or concerns about your condition, please contact your doctor or healthcare provider.  These tables may help you to choose lower potassium and phosphorus foods.  AVOID these higher phosphorus* foods: CHOOSE these lower phosphorus* foods:   Milk, pudding , yogurt made from animals and from many soy varieties Rice milk (unfortified), nondairy creamer   Hard cheeses, ricotta, cottage cheese, fat-free cream cheese Regular and low-fat cream cheese   Ice cream, frozen yogurt Sherbet, frozen fruit pops, sorbet   Soups made with milk, dried peas, beans, lentils or other high phosphorus ingredients Soups made with broth, are water-based, or other lower phosphorus ingredients   Whole grains, including whole-grain breads, crackers, cereal, rice and pasta, corn tortillas Refined grains, including white bread, crackers, cereals, rice and pasta   Quick breads, biscuits, cornbread, muffins, pancakes, waffles, granola, wheat germ Homemade refined (white) dinner rolls, bagels, English muffins, sugar cookies, shortbread cookies, shaheed food cake   Dried peas (split, black-eyed), beans (black, garbanzo, lima, kidney, navy, mondragon), lentils Green peas (canned, frozen), green beans, wax beans   Organ meats, walleye, [place], sardines Lean beef, pork, lamb, poultry, other fish   Nuts and seeds Popcorn   Peanut butter, other nut butters; tofu, veggie or soy burgers Jam, jelly, honey   Chocolate, including chocolate drinks Carob (chocolate-flavored) candy, hard candy,  gumdrops   Jose Antonio, pepper-type sodas, flavored darling, bottled teas, beer Lemon-lime soda, ginger ale or root beer, plain water, cream  "soda, grape soda   *Always read labels and avoid foods with ingredients containing \"phos\"  AVOID these higher potassium foods: CHOOSE these lower potassium foods:      Milk (fat free, low fat, whole, buttermilk, Soy), yogurt    Regular and low-fat cream cheese      Beans (white, [place]), [place] sprouts, spinach Swiss       chard, broccoli, avocado, artichoke, potatoes, sweet      potatoes, tomatoes/tomato sauce, beet greens      Green beans, alfalfa sprouts, bamboo shoots (canned),       cabbage, carrots, cauliflower, corn, cucumber, eggplant,      endive, lettuce, mushrooms, onions, radishes,  watercress,       water chestnuts (canned), rice, peas      Halibut, tuna, cod, snapper, tuna fish, turkey    Egg, lean beef, pork, lamb, shellfish, chicken       Banana, papaya, orange, cantaloupe, dates, raisins and      other dried fruit, pomegranate, avocado      Apple, applesauce, blackberries, raspberries, pears,     watermelon, cucumbers, blueberries, cranberries,       peaches      Almonds, peanuts, hazelnuts, [place], cashew, mixed,      seeds (sunflower, pumpkin)     Homemade refined (white) dinner rolls, bagels,      English muffins, flour tortilla, crackers, kelli      crackers, popcorn, pretzels, spaghetti or macaroni,       hummus      Tomato or vegetable juice, prune juice    Papaya, isi, or pear nectar, cranberry juice cocktail      Molasses      Follow-up  Call the Surgical office to make a 2 week postoperative follow up appointment [phone]  Make an appointment to follow up with your primary care physician in the next 1-2 weeks.  Please follow up with the nephrology office  Please follow up with Cardiology  Please follow up with Vascular Surgery  Call the office if you have increased pain not relieved with pain medicine.  Call the office if you have a fever,redness, the wound opens up, you have pus draining from your incision.     Wound care  Your incision is covered with staples. No dressing is needed " over top. Staples will be removed at your follow up visit. Please monitor for drainage, foul smelling odor, or redness around your incision.    Activity  No driving until seen in the office.   No driving while taking pain meds.   No heavy lifting or strenuous exercise until you are cleared in the surgery office     Shower  You may shower and get incisions wet, wash with soap and water, do not scrub, rinse, and pat dry.   No baths, swimming pools, or hot tubs, until you follow up with the surgery clinic    Medication  If you do not need strong pain medicine you may take Tylenol.   Do not take acetaminophen (Tylenol) WITH  pain meds that contain acetaminophen. Take one or the other.  Do not exceed more than 4000 mg of acetaminophen in 24 hours or 3000 mg if you have liver disease.   Please take all medications as prescribed on discharge. Continue with taking Lasix 40mg Tuesday, Thursday, and Saturday. Continue with Hydralazine 40 mg two times a day. Continue with Lasix 75mg four times a day.

## 2025-01-02 NOTE — ASSESSMENT & PLAN NOTE
On Norvasc 10 mg p.o. daily, hydralazine 50 mg p.o. twice daily, Metroprolol 12.5 mg p.o. every 12   Hold off on home lisinopril upon discharge can be restarted once stable as an outpatient

## 2025-01-03 ENCOUNTER — TRANSITIONAL CARE MANAGEMENT (OUTPATIENT)
Dept: FAMILY MEDICINE CLINIC | Facility: CLINIC | Age: 80
End: 2025-01-03

## 2025-01-03 VITALS
BODY MASS INDEX: 26.18 KG/M2 | TEMPERATURE: 98.2 F | HEART RATE: 59 BPM | SYSTOLIC BLOOD PRESSURE: 158 MMHG | WEIGHT: 182.89 LBS | DIASTOLIC BLOOD PRESSURE: 48 MMHG | RESPIRATION RATE: 18 BRPM | OXYGEN SATURATION: 94 % | HEIGHT: 70 IN

## 2025-01-03 DIAGNOSIS — Z71.89 COMPLEX CARE COORDINATION: Primary | ICD-10-CM

## 2025-01-03 LAB
ANION GAP SERPL CALCULATED.3IONS-SCNC: 7 MMOL/L (ref 4–13)
BUN SERPL-MCNC: 43 MG/DL (ref 5–25)
CALCIUM SERPL-MCNC: 7.7 MG/DL (ref 8.4–10.2)
CHLORIDE SERPL-SCNC: 110 MMOL/L (ref 96–108)
CO2 SERPL-SCNC: 25 MMOL/L (ref 21–32)
CREAT SERPL-MCNC: 2.05 MG/DL (ref 0.6–1.3)
GFR SERPL CREATININE-BSD FRML MDRD: 29 ML/MIN/1.73SQ M
GLUCOSE SERPL-MCNC: 123 MG/DL (ref 65–140)
GLUCOSE SERPL-MCNC: 131 MG/DL (ref 65–140)
PHOSPHATE SERPL-MCNC: 2.5 MG/DL (ref 2.3–4.1)
POTASSIUM SERPL-SCNC: 3.5 MMOL/L (ref 3.5–5.3)
SODIUM SERPL-SCNC: 142 MMOL/L (ref 135–147)

## 2025-01-03 PROCEDURE — 82948 REAGENT STRIP/BLOOD GLUCOSE: CPT

## 2025-01-03 PROCEDURE — NC001 PR NO CHARGE: Performed by: SURGERY

## 2025-01-03 PROCEDURE — 99024 POSTOP FOLLOW-UP VISIT: CPT | Performed by: SURGERY

## 2025-01-03 PROCEDURE — 99232 SBSQ HOSP IP/OBS MODERATE 35: CPT | Performed by: INTERNAL MEDICINE

## 2025-01-03 PROCEDURE — 80048 BASIC METABOLIC PNL TOTAL CA: CPT | Performed by: INTERNAL MEDICINE

## 2025-01-03 PROCEDURE — 84100 ASSAY OF PHOSPHORUS: CPT | Performed by: INTERNAL MEDICINE

## 2025-01-03 RX ORDER — POTASSIUM CHLORIDE 1500 MG/1
40 TABLET, EXTENDED RELEASE ORAL ONCE
Status: COMPLETED | OUTPATIENT
Start: 2025-01-03 | End: 2025-01-03

## 2025-01-03 RX ORDER — HYDRALAZINE HYDROCHLORIDE 50 MG/1
50 TABLET, FILM COATED ORAL 2 TIMES DAILY
Qty: 60 TABLET | Refills: 0 | Status: SHIPPED | OUTPATIENT
Start: 2025-01-03 | End: 2025-01-11

## 2025-01-03 RX ORDER — METOPROLOL TARTRATE 25 MG/1
12.5 TABLET, FILM COATED ORAL EVERY 12 HOURS SCHEDULED
Qty: 30 TABLET | Refills: 0 | Status: SHIPPED | OUTPATIENT
Start: 2025-01-03 | End: 2025-02-02

## 2025-01-03 RX ORDER — FUROSEMIDE 40 MG/1
40 TABLET ORAL 3 TIMES WEEKLY
Qty: 12 TABLET | Refills: 0 | Status: SHIPPED | OUTPATIENT
Start: 2025-01-06 | End: 2025-01-11

## 2025-01-03 RX ADMIN — AMLODIPINE BESYLATE 10 MG: 10 TABLET ORAL at 08:34

## 2025-01-03 RX ADMIN — HEPARIN SODIUM 5000 UNITS: 5000 INJECTION, SOLUTION INTRAVENOUS; SUBCUTANEOUS at 06:05

## 2025-01-03 RX ADMIN — NICOTINE 1 PATCH: 14 PATCH, EXTENDED RELEASE TRANSDERMAL at 08:35

## 2025-01-03 RX ADMIN — Medication 12.5 MG: at 08:34

## 2025-01-03 RX ADMIN — ACETAMINOPHEN 975 MG: 325 TABLET, FILM COATED ORAL at 06:05

## 2025-01-03 RX ADMIN — HYDRALAZINE HYDROCHLORIDE 50 MG: 50 TABLET ORAL at 08:34

## 2025-01-03 RX ADMIN — POTASSIUM CHLORIDE 40 MEQ: 1500 TABLET, EXTENDED RELEASE ORAL at 08:37

## 2025-01-03 RX ADMIN — PANTOPRAZOLE SODIUM 40 MG: 40 TABLET, DELAYED RELEASE ORAL at 06:05

## 2025-01-03 RX ADMIN — CLOPIDOGREL BISULFATE 75 MG: 75 TABLET ORAL at 08:34

## 2025-01-03 RX ADMIN — PANCRELIPASE 24000 UNITS: 120000; 24000; 76000 CAPSULE, DELAYED RELEASE PELLETS ORAL at 06:33

## 2025-01-03 NOTE — CASE MANAGEMENT
Case Management Discharge Note    Patient name Jay Roach Jr.  Location [facility] 530/[facility] 530-01 MRN [id number]  : 1945 Date 1/3/2025       Current Admission Date: 2024  Current Admission Diagnosis:Mesenteric ischemia (HCC)      LOS (days): 10  Geometric Mean LOS (GMLOS) (days): 8.7  Days to GMLOS:-1.3     OBJECTIVE:  Risk of Unplanned Readmission Score: 40.63   Current admission status: Inpatient   Primary Insurance: MEDICARE  Secondary Insurance: COMMERCIAL MISCELLANEOUS    DISCHARGE DETAILS:  Discharge planning discussed with:: Patient  Freedom of Choice: Yes  CM contacted family/caregiver?: Yes  Were Treatment Team discharge recommendations reviewed with patient/caregiver?: Yes  Did patient/caregiver verbalize understanding of patient care needs?: Yes  Were patient/caregiver advised of the risks associated with not following Treatment Team discharge recommendations?: Yes    Contacts  Patient Contacts: Dom Roach (pt's son)  Relationship to Patient:: Family  Contact Method: Phone  Reason/Outcome: Emergency Contact    Requested Home Health Care         Is the patient interested in HHC at discharge?: Yes  Home Health Discipline requested:: Nursing, Physical Therapy, Occupational Therapy, Other (comment) (Heal-at-Home program)  Home Health Agency Name:: [facility] Follow-Up Provider:: PCP  Home Health Services Needed:: Post-Op Care and Assessment, Evaluate Functional Status and Safety, Gait/ADL Training, Strengthening/Theraputic Exercises to Improve Function, Other (comment) (Heal-at-Home program)  Homebound Criteria Met:: Requires the Assistance of Another Person for Safe Ambulation or to Leave the Home  Supporting Clincal Findings:: Limited Endurance    Treatment Team Recommendation: Home with Home Health Care  Discharge Destination Plan:: Home with Home Health Care  Transport at Discharge : Family    Additional Comments: Pt is cleared for d/c by General Surgery LYN Duran  Sugar. RN Jayla Andrew was notified of pt's d/c order. Pt is accepted for services by [facility] for his aftercare plan. The pt and his son Dom Roach were both informed of d/c. Family will transport pt home later this day; pickup time is TBD. IMM signed by pt. No further CM needs.

## 2025-01-03 NOTE — PHYSICAL THERAPY NOTE
"                                     Physical Therapy Cancellation Note           01/03/25 0853   PT Last Visit   PT Visit Date 01/03/25   Note Type   Note Type Cancelled Session   Cancel Reasons Refusal     PT attempted treatment session, patient refused at this time stating he wants to \"read his paper instead right now\". PT will continue to follow as able and appropriate.     Adelaide Griffiths PT, DPT                                                                                   "

## 2025-01-03 NOTE — ASSESSMENT & PLAN NOTE
80 yo male now s/p 12/24 Ex lap, ADA, mesenteric agram, retrograde open mesenteric stent    AVSS now on room air    Urine output 225 cc overnight  Stool 1 time    BMP pending    Plan  Renal diet  PRN pain meds and anti nausea meds  DVT prophylaxis  Appreciate nephro recs  Continue Plavix  Anticipate discharge within next 24 to 48 hours

## 2025-01-03 NOTE — PROGRESS NOTES
Progress Note - Surgery-General   Name: Jay Roach Jr. 79 y.o. male I MRN: [id number]  [facility] I Date of Admission: 12/24/2024   Date of Service: 1/3/2025 I Hospital Day: 10    Assessment & Plan  Mesenteric ischemia (HCC)  78 yo male now s/p 12/24 Ex lap, ADA, mesenteric agram, retrograde open mesenteric stent    AVSS now on room air    Urine output 225 cc overnight  Stool 1 time    BMP pending    Plan  Renal diet  PRN pain meds and anti nausea meds  DVT prophylaxis  Appreciate nephro recs  Continue Plavix  Anticipate discharge within next 24 to 48 hours  Chronic diastolic CHF (congestive heart failure) ([facility])  Wt Readings from Last 3 Encounters:   01/02/25 82.7 kg (182 lb 5.1 oz)   12/24/24 80 kg (176 lb 5.9 oz)   12/18/24 79.4 kg (175 lb)             CKD (chronic kidney disease) stage 4, GFR 15-29 ml/min ([facility])  Lab Results   Component Value Date    EGFR 30 01/02/2025    EGFR 30 01/01/2025    EGFR 25 12/31/2024    CREATININE 1.99 (H) 01/02/2025    CREATININE 1.99 (H) 01/01/2025    CREATININE 2.33 (H) 12/31/2024           Subjective   No acute events overnight.  Patient denies abdominal pain. Denies having nausea, vomiting, fevers, chills, chest pain, shortness of breath.  Tolerating oral intake.  Voiding without difficulty.  Passing flatus.  No bowel movements.    Objective :  Temp:  [98 °F (36.7 °C)-98.4 °F (36.9 °C)] 98 °F (36.7 °C)  HR:  [52-67] 61  BP: (134-157)/(43-81) 148/58  Resp:  [16-18] 16  SpO2:  [92 %-98 %] 93 %  O2 Device: Nasal cannula    I/O         12/31 0701  01/01 0700 01/01 0701 01/02 0700 01/02 0701 01/03 0700    P.O. 0 420     I.V. (mL/kg)       Total Intake(mL/kg) 0 (0) 420 (5.1)     Urine (mL/kg/hr) 1125 (0.6) 875 (0.4)     Emesis/NG output 0      Stool 0 0     Total Output 1125 875     Net -3187 -865            Unmeasured Urine Occurrence 1 x      Unmeasured Stool Occurrence 1 x 1 x             Physical Exam    Physical Exam:  General: No acute distress, alert and  oriented  Neuro: alert, oriented x3  HENT: PERRL, EOMI  CV: Well perfused, regular rate and rhythm  Lungs: Normal work of breathing, no increased respiratory effort  Abdomen: Soft, nontender, nondistended.  Incisions are clean/dry/intact.  MSK/Extremities: No edema, clubbing or cyanosis  Skin: Warm, dry      Lab Results: I have reviewed the following results:  Recent Labs     01/01/25  0249 01/02/25  0404   WBC 4.04* 5.49   HGB 7.8* 8.6*   HCT 24.6* 27.0*    217   SODIUM 145 141   K 3.8 3.5   * 109*   CO2 24 23   BUN 47* 45*   CREATININE 1.99* 1.99*   GLUC 97 102   MG 2.1  --          VTE Pharmacologic Prophylaxis: Heparin  VTE Mechanical Prophylaxis: sequential compression device

## 2025-01-03 NOTE — INCIDENTAL FINDINGS
The following findings require follow up:  Radiographic finding   Finding:     LOWER CHEST: Reidentified peripheral reticular interstitial and subpleural cystic changes compatible with interstitial lung disease as seen on prior studies. Somewhat more pronounced groundglass opacities in the dependent lower lobes may reflect   superimposed atelectasis versus pneumonia.    PANCREAS: Stable pancreatic atrophy and calcifications compatible with sequela of remote pancreatitis. Stable 1.9 cm hyperdense lesion at the pancreatic head.     KIDNEYS/URETERS: Stable left lower pole nonobstructing intrarenal calculi. No hydronephrosis or urinary tract calculus seen. Simple renal cysts    VESSELS: Severe atherosclerosis without abdominal aortic aneurysm.    REPRODUCTIVE ORGANS: Enlarged prostate.     BONES: Generalized osteopenia. Multilevel degenerative changes of the spine. No acute fracture or suspicious osseous lesion.      Follow up required: PCP   Follow up should be done within 1-2 week(s)    Please notify the following clinician to assist with the follow up:   Dr. Simón Hadley MD    Incidental finding results were discussed with the Patient by Renee Wooten PA-C on 01/03/25.   They expressed understanding and all questions answered. Patient instructed to follow up with PCP in 1-2 weeks to review hospitalization and incidental findings.     Renee Wooten PA-C

## 2025-01-05 ENCOUNTER — HOME CARE VISIT (OUTPATIENT)
Dept: HOME HEALTH SERVICES | Facility: HOME HEALTHCARE | Age: 80
End: 2025-01-05
Payer: MEDICARE

## 2025-01-05 PROCEDURE — 10330081 VN NO-PAY CLAIM PROCEDURE

## 2025-01-05 PROCEDURE — G0299 HHS/HOSPICE OF RN EA 15 MIN: HCPCS

## 2025-01-05 PROCEDURE — 400013 VN SOC

## 2025-01-06 ENCOUNTER — HOME CARE VISIT (OUTPATIENT)
Dept: HOME HEALTH SERVICES | Facility: HOME HEALTHCARE | Age: 80
End: 2025-01-06
Payer: MEDICARE

## 2025-01-06 ENCOUNTER — PATIENT OUTREACH (OUTPATIENT)
Dept: CASE MANAGEMENT | Facility: OTHER | Age: 80
End: 2025-01-06

## 2025-01-06 ENCOUNTER — APPOINTMENT (EMERGENCY)
Dept: RADIOLOGY | Facility: HOSPITAL | Age: 80
DRG: 291 | End: 2025-01-06
Payer: MEDICARE

## 2025-01-06 ENCOUNTER — HOSPITAL ENCOUNTER (INPATIENT)
Facility: HOSPITAL | Age: 80
LOS: 5 days | Discharge: HOME HEALTH | DRG: 291 | End: 2025-01-11
Attending: EMERGENCY MEDICINE | Admitting: INTERNAL MEDICINE
Payer: MEDICARE

## 2025-01-06 VITALS
HEART RATE: 71 BPM | SYSTOLIC BLOOD PRESSURE: 148 MMHG | RESPIRATION RATE: 18 BRPM | OXYGEN SATURATION: 95 % | TEMPERATURE: 97.1 F | DIASTOLIC BLOOD PRESSURE: 55 MMHG

## 2025-01-06 DIAGNOSIS — N18.4 CKD (CHRONIC KIDNEY DISEASE) STAGE 4, GFR 15-29 ML/MIN (HCC): ICD-10-CM

## 2025-01-06 DIAGNOSIS — I50.33 ACUTE ON CHRONIC HEART FAILURE WITH PRESERVED EJECTION FRACTION (HFPEF) (HCC): ICD-10-CM

## 2025-01-06 DIAGNOSIS — I50.9 CHF EXACERBATION (HCC): Primary | ICD-10-CM

## 2025-01-06 DIAGNOSIS — R79.89 ELEVATED TROPONIN: ICD-10-CM

## 2025-01-06 PROBLEM — I25.5 ISCHEMIC CARDIOMYOPATHY: Status: ACTIVE | Noted: 2025-01-06

## 2025-01-06 PROBLEM — D63.1 ANEMIA IN STAGE 3B CHRONIC KIDNEY DISEASE  (HCC): Status: ACTIVE | Noted: 2024-01-10

## 2025-01-06 PROBLEM — N18.32 ANEMIA IN STAGE 3B CHRONIC KIDNEY DISEASE  (HCC): Status: ACTIVE | Noted: 2024-01-10

## 2025-01-06 PROBLEM — K55.1 MESENTERIC ARTERY STENOSIS (HCC): Status: ACTIVE | Noted: 2025-01-06

## 2025-01-06 PROBLEM — I73.9 PAD (PERIPHERAL ARTERY DISEASE) (HCC): Chronic | Status: ACTIVE | Noted: 2025-01-06

## 2025-01-06 PROBLEM — E87.70 VOLUME OVERLOAD: Status: ACTIVE | Noted: 2025-01-06

## 2025-01-06 LAB
2HR DELTA HS TROPONIN: -6 NG/L
4HR DELTA HS TROPONIN: -4 NG/L
ALBUMIN SERPL BCG-MCNC: 2.9 G/DL (ref 3.5–5)
ALP SERPL-CCNC: 68 U/L (ref 34–104)
ALT SERPL W P-5'-P-CCNC: 20 U/L (ref 7–52)
ANION GAP SERPL CALCULATED.3IONS-SCNC: 5 MMOL/L (ref 4–13)
APTT PPP: 28 SECONDS (ref 23–34)
AST SERPL W P-5'-P-CCNC: 19 U/L (ref 13–39)
ATRIAL RATE: 69 BPM
BACTERIA UR QL AUTO: NORMAL /HPF
BASOPHILS # BLD AUTO: 0.01 THOUSANDS/ΜL (ref 0–0.1)
BASOPHILS NFR BLD AUTO: 0 % (ref 0–1)
BILIRUB SERPL-MCNC: 0.48 MG/DL (ref 0.2–1)
BILIRUB UR QL STRIP: NEGATIVE
BNP SERPL-MCNC: 849 PG/ML (ref 0–100)
BUN SERPL-MCNC: 26 MG/DL (ref 5–25)
CALCIUM ALBUM COR SERPL-MCNC: 8.8 MG/DL (ref 8.3–10.1)
CALCIUM SERPL-MCNC: 7.9 MG/DL (ref 8.4–10.2)
CARDIAC TROPONIN I PNL SERPL HS: 32 NG/L (ref ?–50)
CARDIAC TROPONIN I PNL SERPL HS: 34 NG/L (ref ?–50)
CARDIAC TROPONIN I PNL SERPL HS: 38 NG/L (ref ?–50)
CHLORIDE SERPL-SCNC: 112 MMOL/L (ref 96–108)
CLARITY UR: CLEAR
CO2 SERPL-SCNC: 25 MMOL/L (ref 21–32)
COLOR UR: ABNORMAL
CREAT SERPL-MCNC: 1.81 MG/DL (ref 0.6–1.3)
EOSINOPHIL # BLD AUTO: 0.08 THOUSAND/ΜL (ref 0–0.61)
EOSINOPHIL NFR BLD AUTO: 2 % (ref 0–6)
ERYTHROCYTE [DISTWIDTH] IN BLOOD BY AUTOMATED COUNT: 16.2 % (ref 11.6–15.1)
FLUAV AG UPPER RESP QL IA.RAPID: NEGATIVE
FLUBV AG UPPER RESP QL IA.RAPID: NEGATIVE
GFR SERPL CREATININE-BSD FRML MDRD: 34 ML/MIN/1.73SQ M
GLUCOSE SERPL-MCNC: 126 MG/DL (ref 65–140)
GLUCOSE SERPL-MCNC: 142 MG/DL (ref 65–140)
GLUCOSE UR STRIP-MCNC: NEGATIVE MG/DL
HCT VFR BLD AUTO: 23.5 % (ref 36.5–49.3)
HGB BLD-MCNC: 7.5 G/DL (ref 12–17)
HGB UR QL STRIP.AUTO: NEGATIVE
IMM GRANULOCYTES # BLD AUTO: 0.05 THOUSAND/UL (ref 0–0.2)
IMM GRANULOCYTES NFR BLD AUTO: 1 % (ref 0–2)
INR PPP: 1.01 (ref 0.85–1.19)
KETONES UR STRIP-MCNC: NEGATIVE MG/DL
LACTATE SERPL-SCNC: 0.8 MMOL/L (ref 0.5–2)
LEUKOCYTE ESTERASE UR QL STRIP: NEGATIVE
LYMPHOCYTES # BLD AUTO: 0.61 THOUSANDS/ΜL (ref 0.6–4.47)
LYMPHOCYTES NFR BLD AUTO: 13 % (ref 14–44)
MAGNESIUM SERPL-MCNC: 1.6 MG/DL (ref 1.9–2.7)
MCH RBC QN AUTO: 31.3 PG (ref 26.8–34.3)
MCHC RBC AUTO-ENTMCNC: 31.9 G/DL (ref 31.4–37.4)
MCV RBC AUTO: 98 FL (ref 82–98)
MONOCYTES # BLD AUTO: 0.38 THOUSAND/ΜL (ref 0.17–1.22)
MONOCYTES NFR BLD AUTO: 8 % (ref 4–12)
NEUTROPHILS # BLD AUTO: 3.47 THOUSANDS/ΜL (ref 1.85–7.62)
NEUTS SEG NFR BLD AUTO: 76 % (ref 43–75)
NITRITE UR QL STRIP: NEGATIVE
NON-SQ EPI CELLS URNS QL MICRO: NORMAL /HPF
NRBC BLD AUTO-RTO: 0 /100 WBCS
P AXIS: 77 DEGREES
PH UR STRIP.AUTO: 5.5 [PH]
PLATELET # BLD AUTO: 220 THOUSANDS/UL (ref 149–390)
PMV BLD AUTO: 10.7 FL (ref 8.9–12.7)
POTASSIUM SERPL-SCNC: 3.6 MMOL/L (ref 3.5–5.3)
PR INTERVAL: 256 MS
PROCALCITONIN SERPL-MCNC: 0.22 NG/ML
PROT SERPL-MCNC: 5.2 G/DL (ref 6.4–8.4)
PROT UR STRIP-MCNC: ABNORMAL MG/DL
PROTHROMBIN TIME: 13.6 SECONDS (ref 12.3–15)
QRS AXIS: 49 DEGREES
QRSD INTERVAL: 108 MS
QT INTERVAL: 436 MS
QTC INTERVAL: 467 MS
RBC # BLD AUTO: 2.4 MILLION/UL (ref 3.88–5.62)
RBC #/AREA URNS AUTO: NORMAL /HPF
SARS-COV+SARS-COV-2 AG RESP QL IA.RAPID: NEGATIVE
SODIUM SERPL-SCNC: 142 MMOL/L (ref 135–147)
SP GR UR STRIP.AUTO: 1.01 (ref 1–1.03)
T WAVE AXIS: 86 DEGREES
UROBILINOGEN UR STRIP-ACNC: <2 MG/DL
VENTRICULAR RATE: 69 BPM
WBC # BLD AUTO: 4.6 THOUSAND/UL (ref 4.31–10.16)
WBC #/AREA URNS AUTO: NORMAL /HPF

## 2025-01-06 PROCEDURE — 99285 EMERGENCY DEPT VISIT HI MDM: CPT | Performed by: EMERGENCY MEDICINE

## 2025-01-06 PROCEDURE — 85610 PROTHROMBIN TIME: CPT

## 2025-01-06 PROCEDURE — 87804 INFLUENZA ASSAY W/OPTIC: CPT

## 2025-01-06 PROCEDURE — 36415 COLL VENOUS BLD VENIPUNCTURE: CPT

## 2025-01-06 PROCEDURE — 99223 1ST HOSP IP/OBS HIGH 75: CPT | Performed by: INTERNAL MEDICINE

## 2025-01-06 PROCEDURE — 85730 THROMBOPLASTIN TIME PARTIAL: CPT

## 2025-01-06 PROCEDURE — 84484 ASSAY OF TROPONIN QUANT: CPT

## 2025-01-06 PROCEDURE — 81001 URINALYSIS AUTO W/SCOPE: CPT

## 2025-01-06 PROCEDURE — 85025 COMPLETE CBC W/AUTO DIFF WBC: CPT

## 2025-01-06 PROCEDURE — 83880 ASSAY OF NATRIURETIC PEPTIDE: CPT

## 2025-01-06 PROCEDURE — 71046 X-RAY EXAM CHEST 2 VIEWS: CPT

## 2025-01-06 PROCEDURE — 99285 EMERGENCY DEPT VISIT HI MDM: CPT

## 2025-01-06 PROCEDURE — 82948 REAGENT STRIP/BLOOD GLUCOSE: CPT

## 2025-01-06 PROCEDURE — 87811 SARS-COV-2 COVID19 W/OPTIC: CPT

## 2025-01-06 PROCEDURE — 93005 ELECTROCARDIOGRAM TRACING: CPT

## 2025-01-06 PROCEDURE — 93010 ELECTROCARDIOGRAM REPORT: CPT | Performed by: INTERNAL MEDICINE

## 2025-01-06 PROCEDURE — 99222 1ST HOSP IP/OBS MODERATE 55: CPT | Performed by: INTERNAL MEDICINE

## 2025-01-06 PROCEDURE — 83605 ASSAY OF LACTIC ACID: CPT

## 2025-01-06 PROCEDURE — 96374 THER/PROPH/DIAG INJ IV PUSH: CPT

## 2025-01-06 PROCEDURE — 80053 COMPREHEN METABOLIC PANEL: CPT

## 2025-01-06 PROCEDURE — 83735 ASSAY OF MAGNESIUM: CPT

## 2025-01-06 PROCEDURE — 84145 PROCALCITONIN (PCT): CPT

## 2025-01-06 RX ORDER — ALLOPURINOL 300 MG/1
300 TABLET ORAL DAILY
Status: DISCONTINUED | OUTPATIENT
Start: 2025-01-07 | End: 2025-01-11 | Stop reason: HOSPADM

## 2025-01-06 RX ORDER — INSULIN LISPRO 100 [IU]/ML
1-5 INJECTION, SOLUTION INTRAVENOUS; SUBCUTANEOUS
Status: DISCONTINUED | OUTPATIENT
Start: 2025-01-07 | End: 2025-01-11 | Stop reason: HOSPADM

## 2025-01-06 RX ORDER — ATORVASTATIN CALCIUM 80 MG/1
80 TABLET, FILM COATED ORAL
Status: DISCONTINUED | OUTPATIENT
Start: 2025-01-06 | End: 2025-01-11 | Stop reason: HOSPADM

## 2025-01-06 RX ORDER — MAGNESIUM SULFATE HEPTAHYDRATE 40 MG/ML
2 INJECTION, SOLUTION INTRAVENOUS ONCE
Status: COMPLETED | OUTPATIENT
Start: 2025-01-06 | End: 2025-01-06

## 2025-01-06 RX ORDER — HYDRALAZINE HYDROCHLORIDE 50 MG/1
50 TABLET, FILM COATED ORAL 2 TIMES DAILY
Status: DISCONTINUED | OUTPATIENT
Start: 2025-01-06 | End: 2025-01-08

## 2025-01-06 RX ORDER — FUROSEMIDE 10 MG/ML
40 INJECTION INTRAMUSCULAR; INTRAVENOUS ONCE
Status: COMPLETED | OUTPATIENT
Start: 2025-01-06 | End: 2025-01-06

## 2025-01-06 RX ORDER — FUROSEMIDE 10 MG/ML
40 INJECTION INTRAMUSCULAR; INTRAVENOUS 2 TIMES DAILY
Status: COMPLETED | OUTPATIENT
Start: 2025-01-06 | End: 2025-01-07

## 2025-01-06 RX ORDER — HEPARIN SODIUM 5000 [USP'U]/ML
5000 INJECTION, SOLUTION INTRAVENOUS; SUBCUTANEOUS EVERY 8 HOURS SCHEDULED
Status: DISCONTINUED | OUTPATIENT
Start: 2025-01-06 | End: 2025-01-11 | Stop reason: HOSPADM

## 2025-01-06 RX ORDER — FERROUS SULFATE 325(65) MG
325 TABLET ORAL EVERY OTHER DAY
Status: DISCONTINUED | OUTPATIENT
Start: 2025-01-07 | End: 2025-01-11 | Stop reason: HOSPADM

## 2025-01-06 RX ORDER — CLOPIDOGREL BISULFATE 75 MG/1
75 TABLET ORAL DAILY
Status: DISCONTINUED | OUTPATIENT
Start: 2025-01-07 | End: 2025-01-11 | Stop reason: HOSPADM

## 2025-01-06 RX ORDER — AMLODIPINE BESYLATE 10 MG/1
10 TABLET ORAL DAILY
Status: DISCONTINUED | OUTPATIENT
Start: 2025-01-06 | End: 2025-01-11 | Stop reason: HOSPADM

## 2025-01-06 RX ORDER — ACETAMINOPHEN 325 MG/1
650 TABLET ORAL EVERY 4 HOURS PRN
Status: DISCONTINUED | OUTPATIENT
Start: 2025-01-06 | End: 2025-01-11 | Stop reason: HOSPADM

## 2025-01-06 RX ORDER — CALCITRIOL 0.25 UG/1
0.25 CAPSULE, LIQUID FILLED ORAL DAILY
Status: DISCONTINUED | OUTPATIENT
Start: 2025-01-07 | End: 2025-01-11 | Stop reason: HOSPADM

## 2025-01-06 RX ORDER — PANTOPRAZOLE SODIUM 40 MG/1
40 TABLET, DELAYED RELEASE ORAL 2 TIMES DAILY
Status: DISCONTINUED | OUTPATIENT
Start: 2025-01-06 | End: 2025-01-11 | Stop reason: HOSPADM

## 2025-01-06 RX ADMIN — MAGNESIUM SULFATE HEPTAHYDRATE 2 G: 40 INJECTION, SOLUTION INTRAVENOUS at 14:26

## 2025-01-06 RX ADMIN — PANTOPRAZOLE SODIUM 40 MG: 40 TABLET, DELAYED RELEASE ORAL at 21:25

## 2025-01-06 RX ADMIN — HEPARIN SODIUM 5000 UNITS: 5000 INJECTION, SOLUTION INTRAVENOUS; SUBCUTANEOUS at 21:25

## 2025-01-06 RX ADMIN — Medication 12.5 MG: at 21:25

## 2025-01-06 RX ADMIN — ATORVASTATIN CALCIUM 80 MG: 80 TABLET, FILM COATED ORAL at 21:25

## 2025-01-06 RX ADMIN — FUROSEMIDE 40 MG: 10 INJECTION, SOLUTION INTRAMUSCULAR; INTRAVENOUS at 13:46

## 2025-01-06 RX ADMIN — AMLODIPINE BESYLATE 10 MG: 10 TABLET ORAL at 15:53

## 2025-01-06 RX ADMIN — HYDRALAZINE HYDROCHLORIDE 50 MG: 50 TABLET ORAL at 21:25

## 2025-01-06 RX ADMIN — FUROSEMIDE 40 MG: 10 INJECTION, SOLUTION INTRAMUSCULAR; INTRAVENOUS at 21:25

## 2025-01-06 NOTE — ED ATTENDING ATTESTATION
1/6/2025  I, Antonia Herrera MD, saw and evaluated the patient. I have discussed the patient with the resident/non-physician practitioner and agree with the resident's/non-physician practitioner's findings, Plan of Care, and MDM as documented in the resident's/non-physician practitioner's note, except where noted. All available labs and Radiology studies were reviewed.  I was present for key portions of any procedure(s) performed by the resident/non-physician practitioner and I was immediately available to provide assistance.       At this point I agree with the current assessment done in the Emergency Department.  I have conducted an independent evaluation of this patient a history and physical is as follows:    OA: 80 y/o m with h/o HTN, HLD, T2DM, CKD, PAD, s/p TAVR and recent admission for mesenteric ischemia who presents with generalized fatigue and weakness.  Not currently on his diuretics due to medication changes whlie in the hospital, due to restart tomorrow but now he c/o of feeling tliek his feet and legs feel are heavy. + fatigue. + SOB with rest and exertion. Noted return of swelling of his extremities. Viktoriya cp/pressure/palpitations. No n/v. No abd pain. No fevers/chills. No new cough. PE, elderly m, chronically ill appearing, NC/At, pale and dry MM, neck suppl/FROM, RR, decreased BS with scattered rales, abd soft, mild distension, healing incision that are otherwise CDI, no significant abd ttp, + edema b/l LE, intact pulses, AAO. A/p fatigue, sOB and weakness. Concern for volume overload. Will need diuresis. Check labs, imaging, monitor.   ED Course         Critical Care Time  Procedures

## 2025-01-06 NOTE — ASSESSMENT & PLAN NOTE
Recent echocardiogram showing ejection fraction of 65% with abnormal diastolic function.  Known history of TAVR.  No significant regurgitation.

## 2025-01-06 NOTE — PLAN OF CARE
Problem: SAFETY ADULT  Goal: Patient will remain free of falls  Description: INTERVENTIONS:  - Educate patient/family on patient safety including physical limitations  - Instruct patient to call for assistance with activity   - Consult OT/PT to assist with strengthening/mobility   - Keep Call bell within reach  - Keep bed low and locked with side rails adjusted as appropriate  - Keep care items and personal belongings within reach  - Initiate and maintain comfort rounds  - Make Fall Risk Sign visible to staff  - Offer Toileting every 2 Hours, in advance of need  - Initiate/Maintain bed alarm  - Obtain necessary fall risk management equipment  - Apply yellow socks and bracelet for high fall risk patients  - Consider moving patient to room near nurses station  Outcome: Progressing  Goal: Maintain or return to baseline ADL function  Description: INTERVENTIONS:  -  Assess patient's ability to carry out ADLs; assess patient's baseline for ADL function and identify physical deficits which impact ability to perform ADLs (bathing, care of mouth/teeth, toileting, grooming, dressing, etc.)  - Assess/evaluate cause of self-care deficits   - Assess range of motion  - Assess patient's mobility; develop plan if impaired  - Assess patient's need for assistive devices and provide as appropriate  - Encourage maximum independence but intervene and supervise when necessary  - Involve family in performance of ADLs  - Assess for home care needs following discharge   - Consider OT consult to assist with ADL evaluation and planning for discharge  - Provide patient education as appropriate  Outcome: Progressing  Goal: Maintains/Returns to pre admission functional level  Description: INTERVENTIONS:  - Perform AM-PAC 6 Click Basic Mobility/ Daily Activity assessment daily.  - Set and communicate daily mobility goal to care team and patient/family/caregiver.   - Collaborate with rehabilitation services on mobility goals if consulted  -  Perform Range of Motion 4 times a day.  - Reposition patient every 2 hours.  - Dangle patient 4 times a day  - Stand patient 3 times a day  - Ambulate patient 3 times a day  - Out of bed to chair 3 times a day   - Out of bed for meals 3 times a day  - Out of bed for toileting  - Record patient progress and toleration of activity level   Outcome: Progressing     Problem: DISCHARGE PLANNING  Goal: Discharge to home or other facility with appropriate resources  Description: INTERVENTIONS:  - Identify barriers to discharge w/patient and caregiver  - Arrange for needed discharge resources and transportation as appropriate  - Identify discharge learning needs (meds, wound care, etc.)  - Arrange for interpretive services to assist at discharge as needed  - Refer to Case Management Department for coordinating discharge planning if the patient needs post-hospital services based on physician/advanced practitioner order or complex needs related to functional status, cognitive ability, or social support system  Outcome: Progressing     Problem: Knowledge Deficit  Goal: Patient/family/caregiver demonstrates understanding of disease process, treatment plan, medications, and discharge instructions  Description: Complete learning assessment and assess knowledge base.  Interventions:  - Provide teaching at level of understanding  - Provide teaching via preferred learning methods  Outcome: Progressing     Problem: RESPIRATORY - ADULT  Goal: Achieves optimal ventilation and oxygenation  Description: INTERVENTIONS:  - Assess for changes in respiratory status  - Assess for changes in mentation and behavior  - Position to facilitate oxygenation and minimize respiratory effort  - Oxygen administered by appropriate delivery if ordered  - Initiate smoking cessation education as indicated  - Encourage broncho-pulmonary hygiene including cough, deep breathe, Incentive Spirometry  - Assess the need for suctioning and aspirate as needed  -  Assess and instruct to report SOB or any respiratory difficulty  - Respiratory Therapy support as indicated  Outcome: Progressing

## 2025-01-06 NOTE — ED PROVIDER NOTES
Time reflects when diagnosis was documented in both MDM as applicable and the Disposition within this note       Time User Action Codes Description Comment    1/6/2025  1:48 PM Teresita Davis Add [I50.9] CHF exacerbation (HCC)     1/6/2025  1:49 PM Teresita Davis Add [R79.89] Elevated troponin           ED Disposition       ED Disposition   Admit    Condition   Stable    Date/Time   Mon Jan 6, 2025 12:53 PM    Comment   Case was discussed with   and the patient's admission status was agreed to be Admission Status: inpatient status to the service of    .               Assessment & Plan       Medical Decision Making  Amount and/or Complexity of Data Reviewed  Labs: ordered. Decision-making details documented in ED Course.  Radiology: ordered.    Risk  Prescription drug management.  Decision regarding hospitalization.    Patient is 79-year-old male with past medical history of CAD, CKD, diabetes, hypertension, hyperlipidemia, aortic stenosis status post TAVR, HFpEF with recent admission for mesenteric ischemia discharge on Friday.     See history and physical documented below.       Differential diagnosis included but not limited to CHF exacerbation, ACS, arrhythmia, anemia, electrolyte disturbance, deconditioning, doubt PE given clear signs of overload. Plan labs including CBC, CMP, BNP and troponin. EKG, CXR. Will likely require diuresis and admission.     View ED course above for further discussion on patient workup.     On my interpretation of EKG NSR with first degree AV block, 69 bpm, mild QT prolongation, occasional PVCs, no CAIT/STD. T wave inversions in V1.     On review of previous records recent admission for mesenteric ischemia 12/24-1/3 with placement of mesenteric stent and ICU post op. Did well from surgical standpoint and discharged on 1/3.     All labs reviewed and utilized in the medical decision making process  All radiology studies independently viewed by me and interpreted by the  radiologist.  I reviewed all testing with the patient.     Upon re-evaluation patient stable in ED.    Initial trop 38, mag 1.6, bnp 849, creatinine 1.81 which is baseline, hgb 7.5. CXR shows b/l small effusions and some generalized fluffiness suggestive of edema/overload. Given lasix for likely CHF exacerbation. Case discussed with  SLIM and admitted for CHF exacerbation and elevated troponins.  ED Course as of 01/06/25 1349   Mon Jan 06, 2025   1250 BNP(!): 849   1251 MAGNESIUM(!): 1.6   1251 hs TnI 0hr: 38   1251 BUN(!): 26   1251 Creatinine(!): 1.81   1251 LACTIC ACID: 0.8   1252 Procalcitonin: 0.22   1327 WBC: 4.60   1327 Hemoglobin(!): 7.5   1327 Platelet Count: 220       Medications   magnesium sulfate 2 g/50 mL IVPB (premix) 2 g (has no administration in time range)   furosemide (LASIX) injection 40 mg (40 mg Intravenous Given 1/6/25 1346)       ED Risk Strat Scores                          SBIRT 20yo+      Flowsheet Row Most Recent Value   Initial Alcohol Screen: US AUDIT-C     1. How often do you have a drink containing alcohol? 0 Filed at: 01/06/2025 1024   2. How many drinks containing alcohol do you have on a typical day you are drinking?  0 Filed at: 01/06/2025 1024   3b. FEMALE Any Age, or MALE 65+: How often do you have 4 or more drinks on one occassion? 0 Filed at: 01/06/2025 1024   Audit-C Score 0 Filed at: 01/06/2025 1024   MERRILL: How many times in the past year have you...    Used an illegal drug or used a prescription medication for non-medical reasons? Never Filed at: 01/06/2025 1024                            History of Present Illness       Chief Complaint   Patient presents with    Shortness of Breath     Per ems pt was just dc this past Friday from hospital, pt c.o increase sob on exertion. Denies chest pain.       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 ([facility]) 02/24/2023    Basal cell carcinoma     right cheek    CAD (coronary artery disease)     Carotid stenosis, asymptomatic, bilateral     Chronic kidney disease     Colon polyp     Dependence on respirator (ventilator) status ([facility]) 04/07/2023    Diabetes ([facility])     type 2, non-insulin dependent    Diabetes mellitus ([facility])     GERD (gastroesophageal reflux disease)     History of nephrolithiasis     Hyperlipidemia     Hypertension     Left foot pain 11/30/2022    Lung cancer ([facility])     PAD (peripheral artery disease) ([facility])     Severe aortic stenosis     Squamous cell skin cancer 11/22/2022    left superior helix      Past Surgical History:   Procedure Laterality Date    AORTA - SUPERIOR MESENTERIC ARTERY BYPASS GRAFT N/A 12/24/2024    Procedure: OPEN MESENTERIC STENTING;  Surgeon: Eagle Higuera MD;  [facility];  Service: Vascular    APPENDECTOMY      ARTERIOGRAM N/A 12/24/2024    Procedure: ARTERIOGRAM;  Surgeon: Eagle Higuera MD;  [facility];  Service: Vascular    CARDIAC CATHETERIZATION N/A 05/05/2022    Procedure: CARDIAC RHC/LHC;  Surgeon: Arvin Sanchez DO;  [facility];  Service: Cardiology    CARDIAC CATHETERIZATION N/A 05/05/2022    Procedure: Cardiac Coronary Angiogram;  Surgeon: Arvin Sanchez DO;  [facility];  Service: Cardiology    CARDIAC CATHETERIZATION N/A 05/24/2022    Procedure: Cardiac pci;  Surgeon: Damien Jeter MD;  [facility];  Service: Cardiology    CARDIAC CATHETERIZATION N/A 06/14/2022    Procedure: CARDIAC TAVR;  Surgeon: Nahum Vaz MD;  [facility];  Service: Cardiology    COLECTOMY      COLONOSCOPY  2013    CORONARY ARTERY BYPASS GRAFT  2013    X 2    FEMORAL ARTERY - POPLITEAL ARTERY BYPASS GRAFT      HEMORRHOID SURGERY      IR AORTAGRAM WITH RUN-OFF  11/19/2018    IR AORTAGRAM WITH RUN-OFF  03/02/2023    IR BIOPSY  LUNG  4/17/2024    IR LOWER EXTREMITY ANGIOGRAM  03/23/2023    IR MESENTERIC/VISCERAL ANGIOGRAM  12/24/2024    MOHS SURGERY Left 01/11/2023    SCC left superior helix-Dr Guy    AR LAPS ABD PRTM&OMENTUM DX W/WO SPEC BR/WA SPX N/A 12/24/2024    Procedure: EXPLORATORY LAPAROTOMY, LYSIS OF ADHESIONS, ABDOMEN CLOSURE;  Surgeon: Alvin Flores MD;  [facility];  Service: General    AR SLCTV CATHJ 3RD+ ORD SLCTV ABDL PEL/LXTR BRNCH Left 08/12/2016    Procedure: LEFT FEMORAL ARTERIOGRAM; BALLOON ANGIOPLASTY; SFA  AND FEMORAL AT VEIN GRAFT;  Surgeon: Nicholas Urnea MD;  [facility];  Service: Vascular    AR TEAEC W/WO PATCH GRAFT COMMON FEMORAL Right 03/23/2023    Procedure: Common femoral endarterectomy&antegrade intervention of SFA, popliteal artery w/ shockwave;  Surgeon: Eagle Higuera MD;  Location[facility];  Service: Vascular    AR TRANSCATHETER TRANSAPICAL REPLACEMT AORTIC VALVE N/A 06/14/2022    Procedure: REPLACEMENT AORTIC VALVE TRANSCATHETER (TAVR) TRANSAPICAL 29MM IRVIN KYLER S3 ULTRA VALVE(ACCESS ON LEFT) ELANA;  Surgeon: Amarjit Gordon DO;  [facility];  Service: Cardiac Surgery    SKIN CANCER EXCISION      TONSILLECTOMY AND ADENOIDECTOMY      UPPER GASTROINTESTINAL ENDOSCOPY        Family History   Problem Relation Age of Onset    Heart attack Father     Other Sister         bypass and vlave replacement    Stroke Paternal Uncle     Arrhythmia Neg Hx     Asthma Neg Hx     Clotting disorder Neg Hx     Fainting Neg Hx     Anuerysm Neg Hx     Hypertension Neg Hx         unsure     Hyperlipidemia Neg Hx     Heart failure Neg Hx       Social History     Tobacco Use    Smoking status: Every Day     Current packs/day: 1.00     Average packs/day: 0.6 packs/day for 100.0 years (62.5 ttl pk-yrs)     Types: Cigarettes     Passive exposure: Current    Smokeless tobacco: Never    Tobacco comments:     4 cigarettes per day   Vaping Use    Vaping status: Never Used   Substance  Use Topics    Alcohol use: Not Currently     Comment: rare    Drug use: No      E-Cigarette/Vaping    E-Cigarette Use Never User       E-Cigarette/Vaping Substances    Nicotine No     THC No     CBD No     Flavoring No     Other No     Unknown No       I have reviewed and agree with the history as documented.     HPI  Patient is 79-year-old male with past medical history of CAD, CKD, diabetes, hypertension, hyperlipidemia, aortic stenosis status post TAVR, HFpEF with recent admission for mesenteric ischemia discharge on Friday. He reports usually on torsemide daily but swithced to lasix every other day to start tomorrow, has not had since dc on Friday. Reports generalized weakness, fatigue, SOB particuarly with exertion and b/l leg swelling. Denies chest pain, abdominal pain, n/v/d.     Review of Systems   Constitutional:  Positive for fatigue. Negative for chills and fever.   HENT:  Negative for ear pain and sore throat.    Respiratory:  Positive for shortness of breath. Negative for cough.    Cardiovascular:  Positive for leg swelling. Negative for chest pain and palpitations.   Gastrointestinal:  Negative for abdominal pain, diarrhea, nausea and vomiting.   Genitourinary:  Negative for dysuria, frequency and hematuria.   Musculoskeletal:  Negative for back pain and neck pain.   Skin:  Negative for rash.   Neurological:  Positive for weakness. Negative for dizziness, light-headedness and headaches.           Objective       ED Triage Vitals [01/06/25 1020]   Temperature Pulse Blood Pressure Respirations SpO2 Patient Position - Orthostatic VS   97.9 °F (36.6 °C) 73 (!) 188/77 20 98 % Sitting      Temp Source Heart Rate Source BP Location FiO2 (%) Pain Score    Oral Monitor Left arm -- 5      Vitals      Date and Time Temp Pulse SpO2 Resp BP Pain Score FACES Pain Rating User   01/06/25 1023 -- -- 99 % -- -- -- -- AM   01/06/25 1020 97.9 °F (36.6 °C) 73 98 % 20 188/77 5 -- AM            Physical Exam  Vitals  reviewed.   Constitutional:       General: He is awake.   HENT:      Head: Normocephalic and atraumatic.      Mouth/Throat:      Mouth: Mucous membranes are moist.   Eyes:      Extraocular Movements: Extraocular movements intact.      Right eye: No nystagmus.      Left eye: No nystagmus.      Conjunctiva/sclera: Conjunctivae normal.      Pupils: Pupils are equal, round, and reactive to light.   Cardiovascular:      Rate and Rhythm: Normal rate and regular rhythm.      Pulses: Normal pulses.      Heart sounds: Normal heart sounds, S1 normal and S2 normal. Heart sounds not distant. No murmur heard.     No friction rub. No gallop.   Pulmonary:      Breath sounds: No stridor. No wheezing, rhonchi or rales.      Comments: Crackles at b/l bases  Abdominal:      General: Bowel sounds are normal. There is distension.      Palpations: Abdomen is soft.      Tenderness: There is no abdominal tenderness.   Musculoskeletal:      Right lower le+ Edema present.      Left lower le+ Edema present.   Skin:     General: Skin is warm and dry.      Capillary Refill: Capillary refill takes less than 2 seconds.   Neurological:      Mental Status: He is alert and oriented to person, place, and time.      GCS: GCS eye subscore is 4. GCS verbal subscore is 5. GCS motor subscore is 6.      Cranial Nerves: Cranial nerves 2-12 are intact.      Sensory: Sensation is intact.      Motor: No weakness or pronator drift.      Coordination: Coordination normal. Finger-Nose-Finger Test normal.         Results Reviewed       Procedure Component Value Units Date/Time    CBC and differential [[id number]]  (Abnormal) Collected: 25 1205    Lab Status: Final result Specimen: Blood from Arm, Right Updated: 25 1319     WBC 4.60 Thousand/uL      RBC 2.40 Million/uL      Hemoglobin 7.5 g/dL      Hematocrit 23.5 %      MCV 98 fL      MCH 31.3 pg      MCHC 31.9 g/dL      RDW 16.2 %      MPV 10.7 fL      Platelets 220 Thousands/uL      nRBC 0  /100 WBCs      Segmented % 76 %      Immature Grans % 1 %      Lymphocytes % 13 %      Monocytes % 8 %      Eosinophils Relative 2 %      Basophils Relative 0 %      Absolute Neutrophils 3.47 Thousands/µL      Absolute Immature Grans 0.05 Thousand/uL      Absolute Lymphocytes 0.61 Thousands/µL      Absolute Monocytes 0.38 Thousand/µL      Eosinophils Absolute 0.08 Thousand/µL      Basophils Absolute 0.01 Thousands/µL     FLU/COVID Rapid Antigen (30 min. TAT) - Preferred screening test in ED [[id number]]  (Normal) Collected: 01/06/25 1205    Lab Status: Final result Specimen: Nares from Nose Updated: 01/06/25 1256     SARS COV Rapid Antigen Negative     Influenza A Rapid Antigen Negative     Influenza B Rapid Antigen Negative    Narrative:      This test has been performed using the Converginidel Nella 2 FLU+SARS Antigen test under the Emergency Use Authorization (EUA). This test has been validated by the  and verified by the performing laboratory. The Nella uses lateral flow immunofluorescent sandwich assay to detect SARS-COV, Influenza A and Influenza B Antigen.     The Quidel Nella 2 SARS Antigen test does not differentiate between SARS-CoV and SARS-CoV-2.     Negative results are presumptive and may be confirmed with a molecular assay, if necessary, for patient management. Negative results do not rule out SARS-CoV-2 or influenza infection and should not be used as the sole basis for treatment or patient management decisions. A negative test result may occur if the level of antigen in a sample is below the limit of detection of this test.     Positive results are indicative of the presence of viral antigens, but do not rule out bacterial infection or co-infection with other viruses.     All test results should be used as an adjunct to clinical observations and other information available to the provider.    FOR PEDIATRIC PATIENTS - copy/paste COVID Guidelines URL to browser:  [email or web address]    Procalcitonin [[id number]]  (Normal) Collected: 01/06/25 1205    Lab Status: Final result Specimen: Blood from Arm, Right Updated: 01/06/25 1252     Procalcitonin 0.22 ng/ml     HS Troponin I 2hr [[id number]]     Lab Status: No result Specimen: Blood     HS Troponin I 4hr [[id number]]     Lab Status: No result Specimen: Blood     HS Troponin 0hr (reflex protocol) [[id number]]  (Normal) Collected: 01/06/25 1205    Lab Status: Final result Specimen: Blood from Arm, Right Updated: 01/06/25 1249     hs TnI 0hr 38 ng/L     B-Type Natriuretic Peptide(BNP) [[id number]]  (Abnormal) Collected: 01/06/25 1205    Lab Status: Final result Specimen: Blood from Arm, Right Updated: 01/06/25 1248      pg/mL     Comprehensive metabolic panel [[id number]]  (Abnormal) Collected: 01/06/25 1205    Lab Status: Final result Specimen: Blood from Arm, Right Updated: 01/06/25 1243     Sodium 142 mmol/L      Potassium 3.6 mmol/L      Chloride 112 mmol/L      CO2 25 mmol/L      ANION GAP 5 mmol/L      BUN 26 mg/dL      Creatinine 1.81 mg/dL      Glucose 126 mg/dL      Calcium 7.9 mg/dL      Corrected Calcium 8.8 mg/dL      AST 19 U/L      ALT 20 U/L      Alkaline Phosphatase 68 U/L      Total Protein 5.2 g/dL      Albumin 2.9 g/dL      Total Bilirubin 0.48 mg/dL      eGFR 34 ml/min/1.73sq m     Narrative:      National Kid[employer] guidelines for Chronic Kidney Disease (CKD):     Stage 1 with normal or high GFR (GFR > 90 mL/min/1.73 square meters)    Stage 2 Mild CKD (GFR = 60-89 mL/min/1.73 square meters)    Stage 3A Moderate CKD (GFR = 45-59 mL/min/1.73 square meters)    Stage 3B Moderate CKD (GFR = 30-44 mL/min/1.73 square meters)    Stage 4 Severe CKD (GFR = 15-29 mL/min/1.73 square meters)    Stage 5 End Stage CKD (GFR <15 mL/min/1.73 square meters)  Note: GFR calculation is accurate only with a steady state creatinine    Magnesium [[id number]]   (Abnormal) Collected: 01/06/25 1205    Lab Status: Final result Specimen: Blood from Arm, Right Updated: 01/06/25 1243     Magnesium 1.6 mg/dL     Lactic acid [[id number]]  (Normal) Collected: 01/06/25 1205    Lab Status: Final result Specimen: Blood from Arm, Right Updated: 01/06/25 1242     LACTIC ACID 0.8 mmol/L     Narrative:      Result may be elevated if tourniquet was used during collection.    Protime-INR [[id number]]  (Normal) Collected: 01/06/25 1205    Lab Status: Final result Specimen: Blood from Arm, Right Updated: 01/06/25 1242     Protime 13.6 seconds      INR 1.01    Narrative:      INR Therapeutic Range    Indication                                             INR Range      Atrial Fibrillation                                               2.0-3.0  Hypercoagulable State                                    2.0.2.3  Left Ventricular Asist Device                            2.0-3.0  Mechanical Heart Valve                                  -    Aortic(with afib, MI, embolism, HF, LA enlargement,    and/or coagulopathy)                                     2.0-3.0 (2.5-3.5)     Mitral                                                             2.5-3.5  Prosthetic/Bioprosthetic Heart Valve               2.0-3.0  Venous thromboembolism (VTE: VT, PE        2.0-3.0    APTT [[id number]]  (Normal) Collected: 01/06/25 1205    Lab Status: Final result Specimen: Blood from Arm, Right Updated: 01/06/25 1242     PTT 28 seconds     UA w Reflex to Microscopic w Reflex to Culture [[id number]]     Lab Status: No result Specimen: Urine             XR chest 2 views    (Results Pending)       Procedures    ED Medication and Procedure Management   Prior to Admission Medications   Prescriptions Last Dose Informant Patient Reported? Taking?   Empagliflozin (Jardiance) 10 MG TABS tablet  Self No No   Sig: Take 1 tablet (10 mg total) by mouth every morning   Patient not taking: Reported on 11/11/2024   allopurinol (ZYLOPRIM) 300 mg  tablet  Self No No   Sig: TAKE 1 TABLET DAILY   amLODIPine (NORVASC) 10 mg tablet   No No   Sig: TAKE 1 TABLET DAILY   Patient taking differently: TAKE 1 TABLET PO DAILY   atorvastatin (LIPITOR) 80 mg tablet   No No   Sig: Take 1 tablet (80 mg total) by mouth daily at bedtime   calcitriol (ROCALTROL) 0.25 mcg capsule  Self No No   Sig: Take 1 capsule (0.25 mcg total) by mouth daily   clopidogrel (PLAVIX) 75 mg tablet   No No   Sig: TAKE 1 TABLET DAILY   ferrous sulfate 325 (65 Fe) mg tablet  Self Yes No   Sig: Take 325 mg by mouth every other day   furosemide (LASIX) 40 mg tablet   No No   Sig: Take 1 tablet (40 mg total) by mouth 3 (three) times a week Please take one tablet by mouth 3 times a week. Please take on Tuesday, Thursday, Saturday.   glimepiride (AMARYL) 1 mg tablet  Self No No   Sig: Take 1 tablet (1 mg total) by mouth daily with breakfast   Patient taking differently: Take 1 mg by mouth daily with breakfast. pt takes 1/2 pill (0.5mg) daily PO with breakfast   hydrALAZINE (APRESOLINE) 50 mg tablet   No No   Sig: Take 1 tablet (50 mg total) by mouth 2 (two) times a day   melatonin 3 mg   No No   Sig: Take 2 tablets (6 mg total) by mouth daily at bedtime   Patient not taking: Reported on 1/5/2025   metoprolol tartrate (LOPRESSOR) 25 mg tablet   No No   Sig: Take 0.5 tablets (12.5 mg total) by mouth every 12 (twelve) hours   pancrelipase, Lip-Prot-Amyl, (Creon) 24,000 units   No No   Sig: TAKE 1 CAPSULE THREE TIMES A DAY WITH MEALS   Patient taking differently: Take 24,000 units of lipase by mouth 3 (three) times a day with meals.   pantoprazole (PROTONIX) 40 mg tablet  Self No No   Sig: Take 1 tablet (40 mg total) by mouth 2 (two) times a day      Facility-Administered Medications: None     Patient's Medications   Discharge Prescriptions    No medications on file     No discharge procedures on file.  ED SEPSIS DOCUMENTATION   Time reflects when diagnosis was documented in both MDM as applicable and the  Disposition within this note       Time User Action Codes Description Comment    1/6/2025  1:48 PM Teresita Davis Add [I50.9] CHF exacerbation (HCC)     1/6/2025  1:49 PM Teresita Davis Add [R79.89] Elevated troponin                  Teresita Davis,   01/12/25 2037

## 2025-01-06 NOTE — CONSULTS
Consultation - Nephrology   Jay Roach Jr. 79 y.o. male MRN: [id number]  Unit/Bed#: -01 Encounter: [id number]      Assessment & Plan  CKD (chronic kidney disease) stage 4, GFR 15-29 ml/min ([facility])  Chronic kidney disease with previous baseline creatinine reported at 1.8-2.2.  Ischemic cardiomyopathy  Recent echocardiogram showing ejection fraction of 65% with abnormal diastolic function.  Known history of TAVR.  No significant regurgitation.  Volume overload  Clinically appears to be volume overloaded.  Current recorded weight of 173 pounds, continue with IV Lasix currently on 40 mg twice daily.  Hypertensive kidney disease with stage 4 chronic kidney disease (HCC)  Blood pressure currently appears to be stable  No changes from his outpatient regimen.  Mesenteric artery stenosis (HCC)  Recent admission for abdominal pain with exploratory laparotomy with retrograde open SMA stent placement.  Management as per surgery.    Summary:  Overall renal function remains stable within baseline at 1.8  Agree with current Lasix dosing clinically does appear volume overloaded  Monitor electrolytes closely, replace as needed  No changes from nephrology standpoint      History of Present Illness   Physician Requesting Consult: Karen Vallejo MD  Reason for Consult / Principal Problem: Chronic kidney disease  HPI: Jay Roach Jr. is a 79 y.o. year old male who presents with dyspnea, abdominal bloating and lower extremity swelling.    Patient is an 79-year-old male who was recently hospitalized with abdominal pain.  Status post exploratory laparotomy with open SMA stent placement.  Clinically improved by discharge and was placed on alternate day loop diuretic therapy.  However patient states that since he has returned home he has complained of increasing lower extremity swelling, abdominal distention as well as increasing shortness of breath.  No reports of chest pain or pressure.    History obtained from chart  review and the patient    Review of Systems   Constitutional:  Positive for fatigue.   Respiratory:  Positive for shortness of breath. Negative for chest tightness.    Cardiovascular:  Positive for leg swelling. Negative for chest pain.   Gastrointestinal:  Positive for abdominal distention. Negative for abdominal pain, diarrhea, nausea and vomiting.   Genitourinary:  Negative for difficulty urinating.   Neurological:  Positive for weakness. Negative for dizziness and light-headedness.       Pertinent findings of a 10 point review of systems noted above otherwise all others negative    Historical Information   Patient Active Problem List   Diagnosis    Atherosclerosis of native arteries of extremities with intermittent claudication, bilateral legs ([facility])    Stenosis of noncoronary bypass graft ([facility])    Asymptomatic bilateral carotid artery stenosis    S/P CABG (coronary artery bypass graft)    Primary hypertension    Renal artery stenosis, native, bilateral ([facility])    Coronary artery disease involving native coronary artery of native heart without angina pectoris    Progressive angina ([facility])    Tension headache    Cigarette nicotine dependence with nicotine-induced disorder    Calcific tendinitis of right shoulder region    Right shoulder pain    Sinus bradycardia    Type 2 diabetes mellitus with stage 4 chronic kidney disease, without long-term current use of insulin ([facility])    GERD (gastroesophageal reflux disease)    Hyperlipidemia    Persistent proteinuria, unspecified    CKD (chronic kidney disease) stage 4, GFR 15-29 ml/min ([facility])    Leukopenia    Hypomagnesemia    Renal cyst, left    Acute kidney injury superimposed on chronic kidney disease  ([facility])    Iron deficiency anemia    Chronic diastolic CHF (congestive heart failure) ([facility])    Elevated serum creatinine    S/P TAVR (transcatheter aortic valve replacement)    Benign hypertension with chronic kidney disease, stage III ([facility])    Secondary hyperparathyroidism of  renal origin (HCC)    Anemia due to stage 4 chronic kidney disease  (HCC)    Hyperuricemia    Platelets decreased (HCC)    Vitamin D deficiency    GI bleed    Acute blood loss anemia    Acute kidney injury superimposed on stage 4 chronic kidney disease (HCC)    Melena    Chronic pancreatitis (HCC)    Peptic ulcer    History of GI bleed    Anemia    Primary non-small cell carcinoma of lower lobe of left lung (HCC)    Post-procedural respiratory complication    Benign hypertension with CKD (chronic kidney disease) stage IV (HCC)    Nephrolithiasis    Controlled type 2 diabetes mellitus with stage 4 chronic kidney disease, without long-term current use of insulin (HCC)    Portal Venous Gas    Pneumatosis intestinalis    Pancreatic insufficiency    Mesenteric ischemia (HCC)    SHOAIB (acute kidney injury) (HCC)    Short-term memory loss    Encephalopathy    Insomnia    Frailty syndrome in geriatric patient    Ambulatory dysfunction     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 ([facility]) 09/10/2021    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 (HCC) 02/24/2023    Basal cell carcinoma     right cheek    CAD (coronary artery disease)     Carotid stenosis, asymptomatic, bilateral     Chronic kidney disease     Colon polyp     Dependence on respirator (ventilator) status (HCC) 04/07/2023    Diabetes (HCC)     type 2, non-insulin dependent    Diabetes mellitus (HCC)     GERD (gastroesophageal reflux disease)     History of nephrolithiasis     Hyperlipidemia     Hypertension     Left foot pain 11/30/2022    Lung cancer (HCC)     PAD (peripheral artery disease) (HCC)     Severe aortic stenosis     Squamous cell skin cancer 11/22/2022    left superior helix     Past Surgical History:   Procedure Laterality Date    AORTA - SUPERIOR MESENTERIC ARTERY BYPASS GRAFT N/A 12/24/2024    Procedure: OPEN MESENTERIC  STENTING;  Surgeon: Eagle Higuera MD;  [facility];  Service: Vascular    APPENDECTOMY      ARTERIOGRAM N/A 12/24/2024    Procedure: ARTERIOGRAM;  Surgeon: Eagle Higuera MD;  [facility];  Service: Vascular    CARDIAC CATHETERIZATION N/A 05/05/2022    Procedure: CARDIAC RHC/LHC;  Surgeon: Arvin Sanchez DO;  [facility];  Service: Cardiology    CARDIAC CATHETERIZATION N/A 05/05/2022    Procedure: Cardiac Coronary Angiogram;  Surgeon: Arvin Sanchez DO;  [facility];  Service: Cardiology    CARDIAC CATHETERIZATION N/A 05/24/2022    Procedure: Cardiac pci;  Surgeon: Damien Jeter MD;  [facility];  Service: Cardiology    CARDIAC CATHETERIZATION N/A 06/14/2022    Procedure: CARDIAC TAVR;  Surgeon: Nahum Vaz MD;  [facility];  Service: Cardiology    COLECTOMY      COLONOSCOPY  2013    CORONARY ARTERY BYPASS GRAFT  2013    X 2    FEMORAL ARTERY - POPLITEAL ARTERY BYPASS GRAFT      HEMORRHOID SURGERY      IR AORTAGRAM WITH RUN-OFF  11/19/2018    IR AORTAGRAM WITH RUN-OFF  03/02/2023    IR BIOPSY LUNG  4/17/2024    IR LOWER EXTREMITY ANGIOGRAM  03/23/2023    IR MESENTERIC/VISCERAL ANGIOGRAM  12/24/2024    MOHS SURGERY Left 01/11/2023    SCC left superior helix-Dr Guy    MO LAPS ABD PRTM&OMENTUM DX W/WO SPEC BR/WA SPX N/A 12/24/2024    Procedure: EXPLORATORY LAPAROTOMY, LYSIS OF ADHESIONS, ABDOMEN CLOSURE;  Surgeon: Alvin lFores MD;  [facility];  Service: General    MO SLCTV CATHJ 3RD+ ORD SLCTV ABDL PEL/LXTR BRNCH Left 08/12/2016    Procedure: LEFT FEMORAL ARTERIOGRAM; BALLOON ANGIOPLASTY; SFA  AND FEMORAL AT VEIN GRAFT;  Surgeon: Nicholas Urena MD;  [facility];  Service: Vascular    MO TEAEC W/WO PATCH GRAFT COMMON FEMORAL Right 03/23/2023    Procedure: Common femoral endarterectomy&antegrade intervention of SFA, popliteal artery w/ shockwave;  Surgeon: Eagle Higuera MD;   [facility];  Service: Vascular    SC TRANSCATHETER TRANSAPICAL REPLACEMT AORTIC VALVE N/A 06/14/2022    Procedure: REPLACEMENT AORTIC VALVE TRANSCATHETER (TAVR) TRANSAPICAL 29MM IRVIN KYLER S3 ULTRA VALVE(ACCESS ON LEFT) ELANA;  Surgeon: Amarjit Gordon DO;  Location: [facility];  Service: Cardiac Surgery    SKIN CANCER EXCISION      TONSILLECTOMY AND ADENOIDECTOMY      UPPER GASTROINTESTINAL ENDOSCOPY       Social History   Social History     Substance and Sexual Activity   Alcohol Use Not Currently    Comment: rare     Social History     Substance and Sexual Activity   Drug Use No     Social History     Tobacco Use   Smoking Status Every Day    Current packs/day: 1.00    Average packs/day: 0.6 packs/day for 100.0 years (62.5 ttl pk-yrs)    Types: Cigarettes    Passive exposure: Current   Smokeless Tobacco Never   Tobacco Comments    4 cigarettes per day     Family History   Problem Relation Age of Onset    Heart attack Father     Other Sister         bypass and vlave replacement    Stroke Paternal Uncle     Arrhythmia Neg Hx     Asthma Neg Hx     Clotting disorder Neg Hx     Fainting Neg Hx     Anuerysm Neg Hx     Hypertension Neg Hx         unsure     Hyperlipidemia Neg Hx     Heart failure Neg Hx        Meds/Allergies   current meds:   Current Facility-Administered Medications:     acetaminophen (TYLENOL) tablet 650 mg, Q4H PRN    [START ON 1/7/2025] allopurinol (ZYLOPRIM) tablet 300 mg, Daily    amLODIPine (NORVASC) tablet 10 mg, Daily    atorvastatin (LIPITOR) tablet 80 mg, HS    [START ON 1/7/2025] calcitriol (ROCALTROL) capsule 0.25 mcg, Daily    [START ON 1/7/2025] clopidogrel (PLAVIX) tablet 75 mg, Daily    [START ON 1/7/2025] ferrous sulfate tablet 325 mg, Every Other Day    furosemide (LASIX) injection 40 mg, BID    hydrALAZINE (APRESOLINE) tablet 50 mg, BID    metoprolol tartrate (LOPRESSOR) partial tablet 12.5 mg, Q12H TRENT    pancrelipase (Lip-Prot-Amyl) (CREON) delayed release capsule  "24,000 Units, TID With Meals    pantoprazole (PROTONIX) EC tablet 40 mg, BID    No Known Allergies      Objective   BP (!) 194/83 (BP Location: Left arm)   Pulse 69   Temp 97.9 °F (36.6 °C) (Oral)   Resp 19   Ht 5' 10\" (1.778 m)   Wt 78.5 kg (173 lb)   SpO2 99%   BMI 24.82 kg/m²     Intake/Output Summary (Last 24 hours) at 1/6/2025 1637  Last data filed at 1/6/2025 1625  Gross per 24 hour   Intake --   Output 800 ml   Net -800 ml       Current Weight: Weight - Scale: 78.5 kg (173 lb)    Physical Exam  Constitutional:       Appearance: He is not ill-appearing.   Eyes:      General: No scleral icterus.  Cardiovascular:      Rate and Rhythm: Normal rate and regular rhythm.   Pulmonary:      Effort: Pulmonary effort is normal.      Breath sounds: Rales present.   Abdominal:      General: There is distension.      Palpations: Abdomen is soft.      Tenderness: There is no abdominal tenderness. There is no guarding.   Musculoskeletal:         General: No deformity.      Right lower leg: Edema present.      Left lower leg: Edema present.   Lymphadenopathy:      Cervical: No cervical adenopathy.   Skin:     General: Skin is warm and dry.      Findings: No rash.   Neurological:      Mental Status: He is alert and oriented to person, place, and time.           Lab Results:    Results from last 7 days   Lab Units 01/06/25  1205   WBC Thousand/uL 4.60   HEMOGLOBIN g/dL 7.5*   HEMATOCRIT % 23.5*   PLATELETS Thousands/uL 220     Results from last 7 days   Lab Units 01/06/25  1205   POTASSIUM mmol/L 3.6   CHLORIDE mmol/L 112*   CO2 mmol/L 25   BUN mg/dL 26*   CREATININE mg/dL 1.81*   CALCIUM mg/dL 7.9*       "

## 2025-01-06 NOTE — ASSESSMENT & PLAN NOTE
Recent admission for abdominal pain with exploratory laparotomy with retrograde open SMA stent placement.  Management as per surgery.

## 2025-01-06 NOTE — RESPIRATORY THERAPY NOTE
01/06/25 1616   Respiratory Protocol   Protocol Initiated? Yes   Protocol Selection Respiratory   Language Barrier? No   Medical & Social History Reviewed? Yes   Diagnostic Studies Reviewed? Yes   Physical Assessment Performed? Yes   Respiratory Plan No distress/Pulmonary history;Discontinue Protocol   Respiratory Assessment   Assessment Type Assess only   General Appearance Awake;Alert   Respiratory Pattern Normal   Chest Assessment Chest expansion symmetrical   Bilateral Breath Sounds Clear;Diminished   R Breath Sounds Clear;Diminished   L Breath Sounds Clear;Diminished   Cough None   Resp Comments Assesment preformed and recieved pt on 2L NC. no distress noted at this time. No prior pulmonary hx and pt bilateral breath sounds clear/diminished. Pt SpO2 99% on 2L NC and does not wear oxygen at home. most likely could wean NC oxygen to room air.  No tx indicated at this time and will discontinue order of protocol.

## 2025-01-06 NOTE — PROGRESS NOTES
Outpatient Care Management Note:    New CM referral received from inpatient case management. Patient was hospitalized from 12/24-1/3/25 with mesenteric ischemia. He presented with complaints of increased abdominal pain X 1 week with nausea and vomiting. On 12/24, he had an exploratory laparotomy with open mesenteric stenting. He is to follow up with  general surgery, vascular surgery, nephrology, cardiology, and his PCP. He will be followed by [facility].     CM called Jay and introduced myself. He states that he is doing terrible and is waiting for the ambulance to arrive. He is complaining of shortness of breath and ankle swelling. [facility].  Jay was able to carry on a short conversation with me, without noted shortness of breath on the phone. His brother is with him currently and staying until ambulance arrives.

## 2025-01-07 PROBLEM — I47.29 NSVT (NONSUSTAINED VENTRICULAR TACHYCARDIA) (HCC): Status: ACTIVE | Noted: 2025-01-07

## 2025-01-07 LAB
ANION GAP SERPL CALCULATED.3IONS-SCNC: 7 MMOL/L (ref 4–13)
BUN SERPL-MCNC: 25 MG/DL (ref 5–25)
CALCIUM SERPL-MCNC: 7.9 MG/DL (ref 8.4–10.2)
CHLORIDE SERPL-SCNC: 110 MMOL/L (ref 96–108)
CO2 SERPL-SCNC: 27 MMOL/L (ref 21–32)
CREAT SERPL-MCNC: 1.8 MG/DL (ref 0.6–1.3)
ERYTHROCYTE [DISTWIDTH] IN BLOOD BY AUTOMATED COUNT: 16.2 % (ref 11.6–15.1)
GFR SERPL CREATININE-BSD FRML MDRD: 34 ML/MIN/1.73SQ M
GLUCOSE SERPL-MCNC: 113 MG/DL (ref 65–140)
GLUCOSE SERPL-MCNC: 114 MG/DL (ref 65–140)
GLUCOSE SERPL-MCNC: 137 MG/DL (ref 65–140)
GLUCOSE SERPL-MCNC: 144 MG/DL (ref 65–140)
GLUCOSE SERPL-MCNC: 162 MG/DL (ref 65–140)
HCT VFR BLD AUTO: 24.2 % (ref 36.5–49.3)
HGB BLD-MCNC: 7.8 G/DL (ref 12–17)
MAGNESIUM SERPL-MCNC: 1.8 MG/DL (ref 1.9–2.7)
MCH RBC QN AUTO: 31.5 PG (ref 26.8–34.3)
MCHC RBC AUTO-ENTMCNC: 32.2 G/DL (ref 31.4–37.4)
MCV RBC AUTO: 98 FL (ref 82–98)
PLATELET # BLD AUTO: 217 THOUSANDS/UL (ref 149–390)
PMV BLD AUTO: 10.8 FL (ref 8.9–12.7)
POTASSIUM SERPL-SCNC: 3.4 MMOL/L (ref 3.5–5.3)
RBC # BLD AUTO: 2.48 MILLION/UL (ref 3.88–5.62)
SODIUM SERPL-SCNC: 144 MMOL/L (ref 135–147)
WBC # BLD AUTO: 4.23 THOUSAND/UL (ref 4.31–10.16)

## 2025-01-07 PROCEDURE — 82948 REAGENT STRIP/BLOOD GLUCOSE: CPT

## 2025-01-07 PROCEDURE — 83735 ASSAY OF MAGNESIUM: CPT | Performed by: INTERNAL MEDICINE

## 2025-01-07 PROCEDURE — 85027 COMPLETE CBC AUTOMATED: CPT | Performed by: INTERNAL MEDICINE

## 2025-01-07 PROCEDURE — 99232 SBSQ HOSP IP/OBS MODERATE 35: CPT | Performed by: INTERNAL MEDICINE

## 2025-01-07 PROCEDURE — 99222 1ST HOSP IP/OBS MODERATE 55: CPT | Performed by: INTERNAL MEDICINE

## 2025-01-07 PROCEDURE — 99232 SBSQ HOSP IP/OBS MODERATE 35: CPT | Performed by: PHYSICIAN ASSISTANT

## 2025-01-07 PROCEDURE — 80048 BASIC METABOLIC PNL TOTAL CA: CPT | Performed by: INTERNAL MEDICINE

## 2025-01-07 RX ORDER — POTASSIUM CHLORIDE 1500 MG/1
40 TABLET, EXTENDED RELEASE ORAL 2 TIMES DAILY
Status: COMPLETED | OUTPATIENT
Start: 2025-01-07 | End: 2025-01-07

## 2025-01-07 RX ORDER — MAGNESIUM SULFATE HEPTAHYDRATE 40 MG/ML
2 INJECTION, SOLUTION INTRAVENOUS ONCE
Status: COMPLETED | OUTPATIENT
Start: 2025-01-07 | End: 2025-01-07

## 2025-01-07 RX ORDER — POTASSIUM CHLORIDE 1500 MG/1
20 TABLET, EXTENDED RELEASE ORAL 2 TIMES DAILY
Status: DISCONTINUED | OUTPATIENT
Start: 2025-01-08 | End: 2025-01-11

## 2025-01-07 RX ADMIN — HEPARIN SODIUM 5000 UNITS: 5000 INJECTION, SOLUTION INTRAVENOUS; SUBCUTANEOUS at 15:10

## 2025-01-07 RX ADMIN — PANCRELIPASE 24000 UNITS: 120000; 24000; 76000 CAPSULE, DELAYED RELEASE PELLETS ORAL at 08:18

## 2025-01-07 RX ADMIN — HYDRALAZINE HYDROCHLORIDE 50 MG: 50 TABLET ORAL at 08:18

## 2025-01-07 RX ADMIN — POTASSIUM CHLORIDE 40 MEQ: 1500 TABLET, EXTENDED RELEASE ORAL at 10:34

## 2025-01-07 RX ADMIN — AMLODIPINE BESYLATE 10 MG: 10 TABLET ORAL at 08:18

## 2025-01-07 RX ADMIN — ATORVASTATIN CALCIUM 80 MG: 80 TABLET, FILM COATED ORAL at 21:08

## 2025-01-07 RX ADMIN — MAGNESIUM SULFATE HEPTAHYDRATE 2 G: 40 INJECTION, SOLUTION INTRAVENOUS at 10:35

## 2025-01-07 RX ADMIN — FERROUS SULFATE TAB 325 MG (65 MG ELEMENTAL FE) 325 MG: 325 (65 FE) TAB at 08:18

## 2025-01-07 RX ADMIN — PANTOPRAZOLE SODIUM 40 MG: 40 TABLET, DELAYED RELEASE ORAL at 08:18

## 2025-01-07 RX ADMIN — PANCRELIPASE 24000 UNITS: 120000; 24000; 76000 CAPSULE, DELAYED RELEASE PELLETS ORAL at 12:52

## 2025-01-07 RX ADMIN — HYDRALAZINE HYDROCHLORIDE 50 MG: 50 TABLET ORAL at 21:08

## 2025-01-07 RX ADMIN — CLOPIDOGREL BISULFATE 75 MG: 75 TABLET ORAL at 08:18

## 2025-01-07 RX ADMIN — PANTOPRAZOLE SODIUM 40 MG: 40 TABLET, DELAYED RELEASE ORAL at 21:08

## 2025-01-07 RX ADMIN — HEPARIN SODIUM 5000 UNITS: 5000 INJECTION, SOLUTION INTRAVENOUS; SUBCUTANEOUS at 05:44

## 2025-01-07 RX ADMIN — ALLOPURINOL 300 MG: 300 TABLET ORAL at 08:18

## 2025-01-07 RX ADMIN — HEPARIN SODIUM 5000 UNITS: 5000 INJECTION, SOLUTION INTRAVENOUS; SUBCUTANEOUS at 21:08

## 2025-01-07 RX ADMIN — Medication 12.5 MG: at 08:18

## 2025-01-07 RX ADMIN — FUROSEMIDE 40 MG: 10 INJECTION, SOLUTION INTRAMUSCULAR; INTRAVENOUS at 08:19

## 2025-01-07 RX ADMIN — POTASSIUM CHLORIDE 40 MEQ: 1500 TABLET, EXTENDED RELEASE ORAL at 17:54

## 2025-01-07 RX ADMIN — FUROSEMIDE 40 MG: 10 INJECTION, SOLUTION INTRAMUSCULAR; INTRAVENOUS at 17:57

## 2025-01-07 RX ADMIN — CALCITRIOL CAPSULES 0.25 MCG 0.25 MCG: 0.25 CAPSULE ORAL at 08:19

## 2025-01-07 RX ADMIN — Medication 12.5 MG: at 21:08

## 2025-01-07 RX ADMIN — PANCRELIPASE 24000 UNITS: 120000; 24000; 76000 CAPSULE, DELAYED RELEASE PELLETS ORAL at 17:51

## 2025-01-07 NOTE — ASSESSMENT & PLAN NOTE
Lab Results   Component Value Date    EGFR 34 01/07/2025    EGFR 34 01/06/2025    EGFR 29 01/03/2025    CREATININE 1.80 (H) 01/07/2025    CREATININE 1.81 (H) 01/06/2025    CREATININE 2.05 (H) 01/03/2025   Baseline creatinine 1.8 to 2.2  Had SHOAIB during recent admission  Diuretics as above  Currently renal function is stable   Nephrology input appreciated

## 2025-01-07 NOTE — CASE MANAGEMENT
Case Management Assessment & Discharge Planning Note    Patient name Jay Roach Jr.  Location /-01 MRN [id number]  : 1945 Date 2025       Current Admission Date: 2025  Current Admission Diagnosis:Ischemic cardiomyopathy   Patient Active Problem List    Diagnosis Date Noted Date Diagnosed    Ischemic cardiomyopathy 2025     Volume overload 2025     Mesenteric artery stenosis/recent mesenteric ischemia 2025     Acute on chronic heart failure with preserved ejection fraction (HFpEF) ([facility]) 2025     PAD (peripheral artery disease) ([facility]) 2025     Short-term memory loss 2025     Encephalopathy 2025     Insomnia 2025     Frailty syndrome in geriatric patient 2025     Ambulatory dysfunction 2025     SHOAIB (acute kidney injury) ([facility]) 2024     Portal Venous Gas 2024     Pneumatosis intestinalis 2024     Pancreatic insufficiency 2024     Mesenteric ischemia ([facility]) 2024     Nephrolithiasis 2024     Controlled type 2 diabetes mellitus with stage 4 chronic kidney disease, without long-term current use of insulin ([facility]) 2024     Hypertensive kidney disease with stage 4 chronic kidney disease ([facility]) 2024     Post-procedural respiratory complication 2024     Primary non-small cell carcinoma of lower lobe of left lung ([facility]) 2024     Peptic ulcer 01/10/2024     History of GI bleed 01/10/2024     Melena 2023     Chronic pancreatitis ([facility]) 2023     GI bleed 2023     Acute blood loss anemia 2023     Acute kidney injury superimposed on stage 4 chronic kidney disease ([facility]) 2023     Vitamin D deficiency 2023     Platelets decreased ([facility]) 2023     Anemia due to stage 4 chronic kidney disease  ([facility]) 2023     Hyperuricemia 2023     Benign hypertension with chronic kidney disease, stage III ([facility]) 2022     Secondary  hyperparathyroidism of renal origin ([facility]) 06/28/2022     S/P TAVR (transcatheter aortic valve replacement) 06/14/2022     Elevated serum creatinine 04/12/2022     Chronic diastolic CHF (congestive heart failure) ([facility]) 03/31/2022     Acute kidney injury superimposed on chronic kidney disease  ([facility]) 03/22/2022     Iron deficiency anemia 03/22/2022     Persistent proteinuria, unspecified 01/10/2022     CKD (chronic kidney disease) stage 4, GFR 15-29 ml/min ([facility]) 01/10/2022     Leukopenia 01/10/2022     Hypomagnesemia 01/10/2022     Renal cyst, left 01/10/2022     GERD (gastroesophageal reflux disease)      Hyperlipidemia      Type 2 diabetes mellitus with stage 4 chronic kidney disease, without long-term current use of insulin ([facility]) 06/01/2021     Sinus bradycardia 10/08/2020     Calcific tendinitis of right shoulder region 06/17/2019     Right shoulder pain 06/17/2019     Cigarette nicotine dependence with nicotine-induced disorder 10/22/2018     Atherosclerosis of native arteries of extremities with intermittent claudication, bilateral legs ([facility]) 08/12/2016     Stenosis of noncoronary bypass graft ([facility]) 08/12/2016     Asymptomatic bilateral carotid artery stenosis 08/12/2016     S/P CABG (coronary artery bypass graft) 08/12/2016     Primary hypertension 08/12/2016     Renal artery stenosis, native, bilateral ([facility]) 08/12/2016     Coronary artery disease involving native coronary artery of native heart without angina pectoris 08/12/2016     Progressive angina ([facility]) 08/12/2016     Tension headache 10/24/2013       LOS (days): 1  Geometric Mean LOS (GMLOS) (days): 3  Days to GMLOS:2.2     OBJECTIVE:  PATIENT READMITTED TO HOSPITAL  Risk of Unplanned Readmission Score: 41.15         Current admission status: Inpatient       Preferred Pharmacy:   [facility]pharmacy #[id number]  [place] [address]  Phone: [phone] Fax: [phone]    [place] [address]  [place] [address] [facility] [address]  Phone: [phone] Fax: [phone]    [facility] - [place], [place] - [address] [facility] [address]  [address] [facility] [address]  [place] [address]  Phone: [phone] Fax: [phone]    Primary Care Provider: Simón Hadley MD    Primary Insurance: MEDICARE  Secondary Insurance: COMMERCIAL MISCELLANEOUS    ASSESSMENT:  Active Health Care Proxies       Dom Roach Health Care Agent - Son   Primary Phone: [phone] (Mobile)                           Readmission Root Cause  30 Day Readmission: Yes  During your hospital stay, did someone (provider, nurse, ) explain your care to you in a way you could understand?: Yes  Did you feel medically stable to leave the hospital?: Yes  Were you able to pay for your medication at the pharmacy?: Yes  Did you have reliable transportation to take you to your appointments?: Yes  During previous admission, was a post-acute recommendation made?: Yes  What post-acute resources were offered?: HHC  Patient was readmitted due to: CHF  Action Plan: IV Lasix    Patient Information  Admitted from:: Home  Mental Status: Alert    Activities of Daily Living Prior to Admission  Does patient have a history of HHC?: Yes ([facility])  Does patient currently have HHC?: Yes    Current Home Health Care  Type of Current Home Care Services: Home PT, Home health aide  Home Health Agency Name::  [facility]  Current Home Health Follow-Up Provider:: PCP                         DISCHARGE DETAILS:    Discharge planning discussed with:: readmit-see assessment completed 12/25                           Requested Home Health Care         Home Health Agency Name:: [facility]                                                                Additional Comments: open SL RACHELA

## 2025-01-07 NOTE — ASSESSMENT & PLAN NOTE
History of CABG.  Coronary angiogram 5/2022 patent LIMA to LAD and patent SVG to OM.  On graft to proximal RCA status post BURAK  On aspirin and Plavix, beta-blocker and statin.  Troponins negative  ECG sinus rhythm first-degree block PVC

## 2025-01-07 NOTE — ASSESSMENT & PLAN NOTE
Wt Readings from Last 3 Encounters:   01/06/25 78.5 kg (173 lb)   01/03/25 83 kg (182 lb 14.3 oz)   12/24/24 80 kg (176 lb 5.9 oz)     Presents with worsening shortness of breath since discharge on 1/3/25 with lower extremity edema and abdominal distension  Hospitalized from 12/24/2024 to 1/3/2025 with mesenteric ischemia.  Underwent exploratory laparotomy, lysis of adhesions, mesenteric angiogram and retrograde open mesenteric stent placement on 12/24/24. During hospitalization had SHOAIB on CKD-4. Was discharge on Lasix 40 mg 3 times weekly on TTS  Echo (6/8/23) - EF 65%, grade 1 diastolic dysfunction    CXR - small pleural effusions  IV Lasix 40 mg BID  Monitor I/O, daily weights  Repeat echo  Cardiology consult

## 2025-01-07 NOTE — ASSESSMENT & PLAN NOTE
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 diagnosed in April 2024  Completed definitive SBRT on 5/30/2024  Follows with radiation oncology

## 2025-01-07 NOTE — ASSESSMENT & PLAN NOTE
Lab Results   Component Value Date    EGFR 34 01/07/2025    EGFR 34 01/06/2025    EGFR 29 01/03/2025    CREATININE 1.80 (H) 01/07/2025    CREATININE 1.81 (H) 01/06/2025    CREATININE 2.05 (H) 01/03/2025   Baseline creatinine 1.8-2.2  Nephrology following as well

## 2025-01-07 NOTE — ASSESSMENT & PLAN NOTE
Recent admission as above - s/p retrograde open mesenteric stent placement on 12/24/24  Ct Plavix, statin

## 2025-01-07 NOTE — ASSESSMENT & PLAN NOTE
Is persistently hypertensive  Currently on amlodipine 10 mg daily, hydralazine 50 mg twice daily, Lopressor 12.5 mg twice daily.  Additionally on IV Lasix.

## 2025-01-07 NOTE — ASSESSMENT & PLAN NOTE
Wt Readings from Last 3 Encounters:   01/06/25 78.5 kg (173 lb)   01/03/25 83 kg (182 lb 14.3 oz)   12/24/24 80 kg (176 lb 5.9 oz)     Presents with worsening shortness of breath since discharge on 1/3/25 with lower extremity edema and abdominal distension  Hospitalized from 12/24/2024 to 1/3/2025 with mesenteric ischemia.  Underwent exploratory laparotomy, lysis of adhesions, mesenteric angiogram and retrograde open mesenteric stent placement on 12/24/24. During hospitalization had SHOAIB on CKD-4. Was discharge on Lasix 40 mg 3 times weekly on TTS  Echo (6/8/23) - EF 65%, grade 1 diastolic dysfunction    CXR - small pleural effusions  IV Lasix 40 mg BID -- hopeful transition to PO in 24 hrs.  Cardiology and nephrology following   Monitor I/O, daily weights  Repeat echo pending   Potassium and magnesium supplementation

## 2025-01-07 NOTE — ASSESSMENT & PLAN NOTE
Lab Results   Component Value Date    EGFR 34 01/06/2025    EGFR 29 01/03/2025    EGFR 30 01/02/2025    CREATININE 1.81 (H) 01/06/2025    CREATININE 2.05 (H) 01/03/2025    CREATININE 1.99 (H) 01/02/2025   Baseline creatinine 1.8 to 2.2  Had SHOAIB during recent admission  Diuretics as above  Nephrology input appreciated

## 2025-01-07 NOTE — ASSESSMENT & PLAN NOTE
"Lab Results   Component Value Date    HGBA1C 6.6 (H) 12/24/2024       No results for input(s): \"POCGLU\" in the last 72 hours.    Blood Sugar Average: Last 72 hrs:    Home regimen: Glimepiride 1 mg daily, Empagliflozin 10 mg daily - hold while inpatient  Sliding scale insulin  Monitor blood sugars and adjust insulin accordingly    "

## 2025-01-07 NOTE — PLAN OF CARE
Problem: SAFETY ADULT  Goal: Patient will remain free of falls  Description: INTERVENTIONS:  - Educate patient/family on patient safety including physical limitations  - Instruct patient to call for assistance with activity   - Consult OT/PT to assist with strengthening/mobility   - Keep Call bell within reach  - Keep bed low and locked with side rails adjusted as appropriate  - Keep care items and personal belongings within reach  - Initiate and maintain comfort rounds  - Make Fall Risk Sign visible to staff  - Offer Toileting every  Hours, in advance of need  - Initiate/Maintain alarm  - Obtain necessary fall risk management equipment:   Problem: SAFETY ADULT  Goal: Maintain or return to baseline ADL function  Description: INTERVENTIONS:  -  Assess patient's ability to carry out ADLs; assess patient's baseline for ADL function and identify physical deficits which impact ability to perform ADLs (bathing, care of mouth/teeth, toileting, grooming, dressing, etc.)  - Assess/evaluate cause of self-care deficits   - Assess range of motion  - Assess patient's mobility; develop plan if impaired  - Assess patient's need for assistive devices and provide as appropriate  - Encourage maximum independence but intervene and supervise when necessary  - Involve family in performance of ADLs  - Assess for home care needs following discharge   - Consider OT consult to assist with ADL evaluation and planning for discharge  - Provide patient education as appropriate  Outcome: Progressing     - Apply yellow socks and bracelet for high fall risk patients  - Consider moving patient to room near nurses station  Outcome: Progressing

## 2025-01-07 NOTE — PROGRESS NOTES
Progress Note - Hospitalist   Name: Jay Roach Jr. 79 y.o. male I MRN: [id number]  Unit/Bed#: -01 I Date of Admission: 1/6/2025   Date of Service: 1/7/2025 I Hospital Day: 1    Assessment & Plan  Acute on chronic heart failure with preserved ejection fraction (HFpEF) ([facility])  Wt Readings from Last 3 Encounters:   01/06/25 78.5 kg (173 lb)   01/03/25 83 kg (182 lb 14.3 oz)   12/24/24 80 kg (176 lb 5.9 oz)     Presents with worsening shortness of breath since discharge on 1/3/25 with lower extremity edema and abdominal distension  Hospitalized from 12/24/2024 to 1/3/2025 with mesenteric ischemia.  Underwent exploratory laparotomy, lysis of adhesions, mesenteric angiogram and retrograde open mesenteric stent placement on 12/24/24. During hospitalization had SHOAIB on CKD-4. Was discharge on Lasix 40 mg 3 times weekly on TTS  Echo (6/8/23) - EF 65%, grade 1 diastolic dysfunction    CXR - small pleural effusions  IV Lasix 40 mg BID -- hopeful transition to PO in 24 hrs.  Cardiology and nephrology following   Monitor I/O, daily weights  Repeat echo pending   Potassium and magnesium supplementation   CKD (chronic kidney disease) stage 4, GFR 15-29 ml/min ([facility])  Lab Results   Component Value Date    EGFR 34 01/07/2025    EGFR 34 01/06/2025    EGFR 29 01/03/2025    CREATININE 1.80 (H) 01/07/2025    CREATININE 1.81 (H) 01/06/2025    CREATININE 2.05 (H) 01/03/2025   Baseline creatinine 1.8 to 2.2  Had SHOAIB during recent admission  Diuretics as above  Currently renal function is stable   Nephrology input appreciated  Coronary artery disease involving native coronary artery of native heart without angina pectoris  S/p CABG  PCI to RCA in 2022 (pre-TAVR)   Ct Plavix, statin, BB  Primary hypertension  Ct home meds Hydralazine 50 mg BID, Metoprolol tartrate 12.5 mg BID, Amlodipine 10 mg daily  Diuresis as above  Monitor BP  Mesenteric artery stenosis/recent mesenteric ischemia  Recent admission as above - s/p  retrograde open mesenteric stent placement on 12/24/24  Ct Plavix, statin  Type 2 diabetes mellitus with stage 4 chronic kidney disease, without long-term current use of insulin ([facility])  Lab Results   Component Value Date    HGBA1C 6.6 (H) 12/24/2024       Recent Labs     01/06/25  2104 01/07/25  0754 01/07/25  1152   POCGLU 142* 114 144*       Blood Sugar Average: Last 72 hrs:  (P) [id number]  Home regimen: Glimepiride 1 mg daily, Empagliflozin 10 mg daily - hold while inpatient  Sliding scale insulin  Monitor blood sugars and adjust insulin accordingly  GERD (gastroesophageal reflux disease)  Ct PPI  S/P TAVR (transcatheter aortic valve replacement)  In 2022  F/u echo  Primary non-small cell carcinoma of lower lobe of left lung (HCC)  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 diagnosed in April 2024  Completed definitive SBRT on 5/30/2024  Follows with radiation oncology   History of GI bleed  H/o gastric ulcers s/p clipping  Ct PPI  PAD (peripheral artery disease) ([facility])  S/p bilateral lower extremity revascularization  NSVT (nonsustained ventricular tachycardia) ([facility])      VTE Pharmacologic Prophylaxis: VTE Score: 4 Moderate Risk (Score 3-4) - Pharmacological DVT Prophylaxis Ordered: heparin.    Mobility:   Basic Mobility Inpatient Raw Score: 18  JH-HLM Goal: 6: Walk 10 steps or more  JH-HLM Achieved: 6: Walk 10 steps or more  JH-HLM Goal achieved. Continue to encourage appropriate mobility.    Patient Centered Rounds: I performed bedside rounds with nursing staff today.   Discussions with Specialists or Other Care Team Provider: CM    Education and Discussions with Family / Patient: Patient declined call to .     Current Length of Stay: 1 day(s)  Current Patient Status: Inpatient   Certification Statement: The patient will continue to require additional inpatient hospital stay due to IV diuresis/CHF exacerbation   Discharge Plan: Anticipate discharge in 48 hrs to home.    Code  Status: Level 1 - Full Code    Subjective   Feels a lot better today, reports leg swelling went down a lot.     Objective :  Temp:  [97.9 °F (36.6 °C)-98.2 °F (36.8 °C)] 97.9 °F (36.6 °C)  HR:  [54-70] 56  BP: (139-194)/(45-83) 156/45  Resp:  [18-19] 18  SpO2:  [93 %-99 %] 96 %  O2 Device: Nasal cannula  Nasal Cannula O2 Flow Rate (L/min):  [2 L/min] 2 L/min    Body mass index is 24.82 kg/m².     Input and Output Summary (last 24 hours):     Intake/Output Summary (Last 24 hours) at 1/7/2025 1438  Last data filed at 1/7/2025 1301  Gross per 24 hour   Intake 430 ml   Output 4050 ml   Net -3620 ml       Physical Exam  Vitals reviewed.   Constitutional:       General: He is not in acute distress.     Appearance: He is not toxic-appearing.   HENT:      Head: Normocephalic and atraumatic.   Eyes:      Extraocular Movements: Extraocular movements intact.   Cardiovascular:      Rate and Rhythm: Normal rate and regular rhythm.   Pulmonary:      Effort: Pulmonary effort is normal. No respiratory distress.   Musculoskeletal:         General: Normal range of motion.      Right lower leg: Edema present.      Left lower leg: Edema present.   Neurological:      General: No focal deficit present.      Mental Status: He is alert and oriented to person, place, and time.   Psychiatric:         Mood and Affect: Mood normal.         Behavior: Behavior normal.         Thought Content: Thought content normal.         Lines/Drains:              Lab Results: I have reviewed the following results:   Results from last 7 days   Lab Units 01/07/25  0626 01/06/25  1205   WBC Thousand/uL 4.23* 4.60   HEMOGLOBIN g/dL 7.8* 7.5*   HEMATOCRIT % 24.2* 23.5*   PLATELETS Thousands/uL 217 220   SEGS PCT %  --  76*   LYMPHO PCT %  --  13*   MONO PCT %  --  8   EOS PCT %  --  2     Results from last 7 days   Lab Units 01/07/25  0626 01/06/25  1205   SODIUM mmol/L 144 142   POTASSIUM mmol/L 3.4* 3.6   CHLORIDE mmol/L 110* 112*   CO2 mmol/L 27 25   BUN mg/dL  25 26*   CREATININE mg/dL 1.80* 1.81*   ANION GAP mmol/L 7 5   CALCIUM mg/dL 7.9* 7.9*   ALBUMIN g/dL  --  2.9*   TOTAL BILIRUBIN mg/dL  --  0.48   ALK PHOS U/L  --  68   ALT U/L  --  20   AST U/L  --  19   GLUCOSE RANDOM mg/dL 113 126     Results from last 7 days   Lab Units 01/06/25  1205   INR  1.01     Results from last 7 days   Lab Units 01/07/25  1152 01/07/25  0754 01/06/25  2104 01/03/25  0626 01/02/25  2106 01/02/25  1601 01/02/25  1039 01/02/25  0806 01/02/25  0638 01/01/25  1758 01/01/25  1151 01/01/25  0605   POC GLUCOSE mg/dl 144* 114 142* 131 205* 127 179* 102 109 203* 122 98         Results from last 7 days   Lab Units 01/06/25  1205   LACTIC ACID mmol/L 0.8   PROCALCITONIN ng/ml 0.22       Recent Cultures (last 7 days):         Imaging Results Review: No pertinent imaging studies reviewed.  Other Study Results Review: No additional pertinent studies reviewed.    Last 24 Hours Medication List:     Current Facility-Administered Medications:     acetaminophen (TYLENOL) tablet 650 mg, Q4H PRN    allopurinol (ZYLOPRIM) tablet 300 mg, Daily    amLODIPine (NORVASC) tablet 10 mg, Daily    atorvastatin (LIPITOR) tablet 80 mg, HS    calcitriol (ROCALTROL) capsule 0.25 mcg, Daily    clopidogrel (PLAVIX) tablet 75 mg, Daily    ferrous sulfate tablet 325 mg, Every Other Day    furosemide (LASIX) injection 40 mg, BID    heparin (porcine) subcutaneous injection 5,000 Units, Q8H TRENT    hydrALAZINE (APRESOLINE) tablet 50 mg, BID    insulin lispro (HumALOG/ADMELOG) 100 units/mL subcutaneous injection 1-5 Units, TID AC **AND** Fingerstick Glucose (POCT), TID AC    metoprolol tartrate (LOPRESSOR) partial tablet 12.5 mg, Q12H TRENT    pancrelipase (Lip-Prot-Amyl) (CREON) delayed release capsule 24,000 Units, TID With Meals    pantoprazole (PROTONIX) EC tablet 40 mg, BID    [START ON 1/8/2025] potassium chloride (Klor-Con M20) CR tablet 20 mEq, BID    potassium chloride (Klor-Con M20) CR tablet 40 mEq, BID    Administrative  Statements   Today, Patient Was Seen By: Raquel Donovan PA-C      **Please Note: This note may have been constructed using a voice recognition system.**

## 2025-01-07 NOTE — ASSESSMENT & PLAN NOTE
Clinically appears to be volume overloaded.    Clinically responding to loop diuretic therapy, edema has improved.  Continue with IV Lasix for the next 24 hours then likely transition to oral diuretic therapy.

## 2025-01-07 NOTE — H&P
H&P - Hospitalist   Name: Jay Roach Jr. 79 y.o. male I MRN: [id number]  Unit/Bed#: -01 I Date of Admission: 1/6/2025   Date of Service: 1/6/2025 I Hospital Day: 0     Assessment & Plan  Acute on chronic heart failure with preserved ejection fraction (HFpEF) ([facility])  Wt Readings from Last 3 Encounters:   01/06/25 78.5 kg (173 lb)   01/03/25 83 kg (182 lb 14.3 oz)   12/24/24 80 kg (176 lb 5.9 oz)     Presents with worsening shortness of breath since discharge on 1/3/25 with lower extremity edema and abdominal distension  Hospitalized from 12/24/2024 to 1/3/2025 with mesenteric ischemia.  Underwent exploratory laparotomy, lysis of adhesions, mesenteric angiogram and retrograde open mesenteric stent placement on 12/24/24. During hospitalization had SHOAIB on CKD-4. Was discharge on Lasix 40 mg 3 times weekly on TTS  Echo (6/8/23) - EF 65%, grade 1 diastolic dysfunction    CXR - small pleural effusions  IV Lasix 40 mg BID  Monitor I/O, daily weights  Repeat echo  Cardiology consult      CKD (chronic kidney disease) stage 4, GFR 15-29 ml/min ([facility])  Lab Results   Component Value Date    EGFR 34 01/06/2025    EGFR 29 01/03/2025    EGFR 30 01/02/2025    CREATININE 1.81 (H) 01/06/2025    CREATININE 2.05 (H) 01/03/2025    CREATININE 1.99 (H) 01/02/2025   Baseline creatinine 1.8 to 2.2  Had SHOAIB during recent admission  Diuretics as above  Nephrology input appreciated  Coronary artery disease involving native coronary artery of native heart without angina pectoris  S/p CABG  PCI to RCA in 2022 (pre-TAVR)   Ct Plavix, statin, BB  Primary hypertension  Ct home meds Hydralazine 50 mg BID, Metoprolol tartrate 12.5 mg BID, Amlodipine 10 mg daily  Diuresis as above  Monitor BP  Mesenteric artery stenosis/recent mesenteric ischemia  Recent admission as above - s/p retrograde open mesenteric stent placement on 12/24/24  Ct Plavix, statin  Type 2 diabetes mellitus with stage 4 chronic kidney disease, without long-term  "current use of insulin ([facility])  Lab Results   Component Value Date    HGBA1C 6.6 (H) 12/24/2024       No results for input(s): \"POCGLU\" in the last 72 hours.    Blood Sugar Average: Last 72 hrs:    Home regimen: Glimepiride 1 mg daily, Empagliflozin 10 mg daily - hold while inpatient  Sliding scale insulin  Monitor blood sugars and adjust insulin accordingly    GERD (gastroesophageal reflux disease)  Ct PPI  S/P TAVR (transcatheter aortic valve replacement)  In 2022  F/u echo  Primary non-small cell carcinoma of lower lobe of left lung (HCC)  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 diagnosed in April 2024  Completed definitive SBRT on 5/30/2024  Follows with radiation oncology     History of GI bleed  H/o gastric ulcers s/p clipping  Ct PPI  PAD (peripheral artery disease) (HCC)  S/p bilateral lower extremity revascularization      VTE Pharmacologic Prophylaxis: VTE Score: 4 Moderate Risk (Score 3-4) - Pharmacological DVT Prophylaxis Ordered: heparin.  Code Status: Level 1 - Full Code   Discussion with family: Patient declined call to .     Anticipated Length of Stay: Patient will be admitted on an inpatient basis with an anticipated length of stay of greater than 2 midnights secondary to acute on chronic CHF.    History of Present Illness   Chief Complaint: Shortness of breath, lower extremity swelling    Jay HAY Marley Harrison is a 79 y.o. male with a PMH of chronic heart failure with preserved ejection fraction, CKD 4, type 2 diabetes, coronary artery disease, GERD, lung cancer s/p radiation, peripheral arterial disease, hyperlipidemia, history of GI bleed with gastric ulcers, hypertension who presents with progressively worsening shortness of breath since discharge  on 1/3/2025 following hospitalization from 12/24/2024 to 1/3/2025 for mesenteric ischemia which he underwent exploratory laparotomy and mesenteric stent placement.  Since the past 2 days he also noted abdominal distention and " lower extremity swelling.  No chest pain.  No cough.  No fever or chills.     Review of Systems   Respiratory:  Positive for shortness of breath.    Cardiovascular:  Positive for leg swelling.   Gastrointestinal:  Positive for abdominal distention.   All other systems reviewed and are negative.      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 ([facility]) 09/10/2021    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 ([facility]) 02/24/2023    Basal cell carcinoma     right cheek    CAD (coronary artery disease)     Carotid stenosis, asymptomatic, bilateral     Chronic kidney disease     Colon polyp     Dependence on respirator (ventilator) status ([facility]) 04/07/2023    Diabetes ([facility])     type 2, non-insulin dependent    Diabetes mellitus ([facility])     GERD (gastroesophageal reflux disease)     History of nephrolithiasis     Hyperlipidemia     Hypertension     Left foot pain 11/30/2022    Lung cancer ([facility])     PAD (peripheral artery disease) ([facility])     Severe aortic stenosis     Squamous cell skin cancer 11/22/2022    left superior helix     Past Surgical History:   Procedure Laterality Date    AORTA - SUPERIOR MESENTERIC ARTERY BYPASS GRAFT N/A 12/24/2024    Procedure: OPEN MESENTERIC STENTING;  Surgeon: Eagle Higuera MD;  [facility];  Service: Vascular    APPENDECTOMY      ARTERIOGRAM N/A 12/24/2024    Procedure: ARTERIOGRAM;  Surgeon: Eagle Higuera MD;  [facility];  Service: Vascular    CARDIAC CATHETERIZATION N/A 05/05/2022    Procedure: CARDIAC RHC/LHC;  Surgeon: Arvin Sanchez DO;  [facility];  Service: Cardiology    CARDIAC CATHETERIZATION N/A 05/05/2022    Procedure: Cardiac Coronary Angiogram;  Surgeon: Arvin Sanchez DO;  [facility];  Service: Cardiology    CARDIAC CATHETERIZATION N/A 05/24/2022     Procedure: Cardiac pci;  Surgeon: Damien Jeter MD;  [facility];  Service: Cardiology    CARDIAC CATHETERIZATION N/A 06/14/2022    Procedure: CARDIAC TAVR;  Surgeon: Nahum Vaz MD;  [facility];  Service: Cardiology    COLECTOMY      COLONOSCOPY  2013    CORONARY ARTERY BYPASS GRAFT  2013    X 2    FEMORAL ARTERY - POPLITEAL ARTERY BYPASS GRAFT      HEMORRHOID SURGERY      IR AORTAGRAM WITH RUN-OFF  11/19/2018    IR AORTAGRAM WITH RUN-OFF  03/02/2023    IR BIOPSY LUNG  4/17/2024    IR LOWER EXTREMITY ANGIOGRAM  03/23/2023    IR MESENTERIC/VISCERAL ANGIOGRAM  12/24/2024    MOHS SURGERY Left 01/11/2023    SCC left superior helix-Dr Guy    AR LAPS ABD PRTM&OMENTUM DX W/WO SPEC BR/WA SPX N/A 12/24/2024    Procedure: EXPLORATORY LAPAROTOMY, LYSIS OF ADHESIONS, ABDOMEN CLOSURE;  Surgeon: Alvin Flores MD;  [facility];  Service: General    AR SLCTV CATHJ 3RD+ ORD SLCTV ABDL PEL/LXTR BRNCH Left 08/12/2016    Procedure: LEFT FEMORAL ARTERIOGRAM; BALLOON ANGIOPLASTY; SFA  AND FEMORAL AT VEIN GRAFT;  Surgeon: Nicholas Urena MD;  [facility];  Service: Vascular    AR TEAEC W/WO PATCH GRAFT COMMON FEMORAL Right 03/23/2023    Procedure: Common femoral endarterectomy&antegrade intervention of SFA, popliteal artery w/ shockwave;  Surgeon: Eagle Higuera MD;  Location: [facility];  Service: Vascular    AR TRANSCATHETER TRANSAPICAL REPLACEMT AORTIC VALVE N/A 06/14/2022    Procedure: REPLACEMENT AORTIC VALVE TRANSCATHETER (TAVR) TRANSAPICAL 29MM IRVIN KYLER S3 ULTRA VALVE(ACCESS ON LEFT) ELANA;  Surgeon: Amarjit Gordon DO;  [facility];  Service: Cardiac Surgery    SKIN CANCER EXCISION      TONSILLECTOMY AND ADENOIDECTOMY      UPPER GASTROINTESTINAL ENDOSCOPY       Social History     Tobacco Use    Smoking status: Every Day     Current packs/day: 1.00     Average packs/day: 0.6 packs/day for 100.0 years (62.5 ttl pk-yrs)     Types: Cigarettes     Passive  exposure: Current    Smokeless tobacco: Never    Tobacco comments:     4 cigarettes per day   Vaping Use    Vaping status: Never Used   Substance and Sexual Activity    Alcohol use: Not Currently     Comment: rare    Drug use: No    Sexual activity: Not Currently     E-Cigarette/Vaping    E-Cigarette Use Never User      E-Cigarette/Vaping Substances    Nicotine No     THC No     CBD No     Flavoring No     Other No     Unknown No      Family History   Problem Relation Age of Onset    Heart attack Father     Other Sister         bypass and vlave replacement    Stroke Paternal Uncle     Arrhythmia Neg Hx     Asthma Neg Hx     Clotting disorder Neg Hx     Fainting Neg Hx     Anuerysm Neg Hx     Hypertension Neg Hx         unsure     Hyperlipidemia Neg Hx     Heart failure Neg Hx      Social History:  Marital Status:      Meds/Allergies   I have reviewed home medications with patient personally.  Prior to Admission medications    Medication Sig Start Date End Date Taking? Authorizing Provider   allopurinol (ZYLOPRIM) 300 mg tablet TAKE 1 TABLET DAILY 7/10/24   Simón Hadley MD   amLODIPine (NORVASC) 10 mg tablet TAKE 1 TABLET DAILY  Patient taking differently: TAKE 1 TABLET PO DAILY 11/27/23   Israel Sanchez MD   atorvastatin (LIPITOR) 80 mg tablet Take 1 tablet (80 mg total) by mouth daily at bedtime 12/18/24   Simón Hadley MD   calcitriol (ROCALTROL) 0.25 mcg capsule Take 1 capsule (0.25 mcg total) by mouth daily 9/5/23   Marcel Muñoz MD   clopidogrel (PLAVIX) 75 mg tablet TAKE 1 TABLET DAILY 11/27/24   Israel Sanchez MD   Empagliflozin (Jardiance) 10 MG TABS tablet Take 1 tablet (10 mg total) by mouth every morning  Patient not taking: Reported on 11/11/2024 11/6/24   Marcel Muñoz MD   ferrous sulfate 325 (65 Fe) mg tablet Take 325 mg by mouth every other day    Historical Provider, MD   furosemide (LASIX) 40 mg tablet Take 1 tablet (40 mg total) by mouth 3 (three) times a week Please take one  tablet by mouth 3 times a week. Please take on Tuesday, Thursday, Saturday. 1/6/25 2/5/25  Renee Wooten PA-C   glimepiride (AMARYL) 1 mg tablet Take 1 tablet (1 mg total) by mouth daily with breakfast  Patient taking differently: Take 1 mg by mouth daily with breakfast. pt takes 1/2 pill (0.5mg) daily PO with breakfast 6/12/24 12/18/24  Simón Hadley MD   hydrALAZINE (APRESOLINE) 50 mg tablet Take 1 tablet (50 mg total) by mouth 2 (two) times a day 1/3/25 2/2/25  Renee Wooten PA-C   melatonin 3 mg Take 2 tablets (6 mg total) by mouth daily at bedtime  Patient not taking: Reported on 1/5/2025 1/3/25   Renee Wooten PA-C   metoprolol tartrate (LOPRESSOR) 25 mg tablet Take 0.5 tablets (12.5 mg total) by mouth every 12 (twelve) hours 1/3/25 2/2/25  Renee Wooten PA-C   pancrelipase, Lip-Prot-Amyl, (Creon) 24,000 units TAKE 1 CAPSULE THREE TIMES A DAY WITH MEALS  Patient taking differently: Take 24,000 units of lipase by mouth 3 (three) times a day with meals. 12/10/24   Amarjit Summers MD   pantoprazole (PROTONIX) 40 mg tablet Take 1 tablet (40 mg total) by mouth 2 (two) times a day 2/6/24   Amarjit Summers MD     No Known Allergies    Objective :  Temp:  [97.9 °F (36.6 °C)-98 °F (36.7 °C)] 97.9 °F (36.6 °C)  HR:  [65-73] 65  BP: (139-194)/(51-83) 139/51  Resp:  [18-20] 18  SpO2:  [95 %-99 %] 96 %  O2 Device: Nasal cannula  Nasal Cannula O2 Flow Rate (L/min):  [2 L/min] 2 L/min    Physical Exam  Vitals reviewed.   HENT:      Mouth/Throat:      Mouth: Mucous membranes are moist.   Eyes:      Extraocular Movements: Extraocular movements intact.   Cardiovascular:      Rate and Rhythm: Normal rate and regular rhythm.   Pulmonary:      Effort: Pulmonary effort is normal.      Breath sounds: Rales present.   Abdominal:      General: Bowel sounds are normal. There is no distension.      Palpations: Abdomen is soft.   Musculoskeletal:      Cervical back: Neck supple.      Right lower leg: Edema present.      Left lower leg:  Edema present.   Skin:     General: Skin is warm and dry.   Neurological:      General: No focal deficit present.      Mental Status: He is alert and oriented to person, place, and time.   Psychiatric:         Mood and Affect: Mood normal.         Behavior: Behavior normal.          Lines/Drains:  Lines/Drains/Airways       Active Status       Name Placement date Placement time Site Days    External Urinary Catheter Small 01/06/25  1525  -- less than 1                          Lab Results: I have reviewed the following results:  Results from last 7 days   Lab Units 01/06/25  1205   WBC Thousand/uL 4.60   HEMOGLOBIN g/dL 7.5*   HEMATOCRIT % 23.5*   PLATELETS Thousands/uL 220   SEGS PCT % 76*   LYMPHO PCT % 13*   MONO PCT % 8   EOS PCT % 2     Results from last 7 days   Lab Units 01/06/25  1205   SODIUM mmol/L 142   POTASSIUM mmol/L 3.6   CHLORIDE mmol/L 112*   CO2 mmol/L 25   BUN mg/dL 26*   CREATININE mg/dL 1.81*   ANION GAP mmol/L 5   CALCIUM mg/dL 7.9*   ALBUMIN g/dL 2.9*   TOTAL BILIRUBIN mg/dL 0.48   ALK PHOS U/L 68   ALT U/L 20   AST U/L 19   GLUCOSE RANDOM mg/dL 126     Results from last 7 days   Lab Units 01/06/25  1205   INR  1.01     Results from last 7 days   Lab Units 01/03/25  0626 01/02/25  2106 01/02/25  1601 01/02/25  1039 01/02/25  0806 01/02/25  0638 01/01/25  1758 01/01/25  1151 01/01/25  0605 12/31/24  2345 12/31/24  1806 12/31/24  1201   POC GLUCOSE mg/dl 131 205* 127 179* 102 109 203* 122 98 103 114 130     Lab Results   Component Value Date    HGBA1C 6.6 (H) 12/24/2024    HGBA1C 7.3 (H) 09/04/2024    HGBA1C 6.6 (H) 03/05/2024     Results from last 7 days   Lab Units 01/06/25  1205   LACTIC ACID mmol/L 0.8   PROCALCITONIN ng/ml 0.22       Imaging Results Review: I reviewed radiology reports from this admission including: chest xray.  Other Study Results Review: EKG was reviewed.     Administrative Statements   I have spent a total time of 76 minutes in caring for this patient on the day of the  visit/encounter including Diagnostic results, Impressions, Counseling / Coordination of care, Documenting in the medical record, Reviewing / ordering tests, medicine, procedures  , Obtaining or reviewing history  , and Communicating with other healthcare professionals .    ** Please Note: This note has been constructed using a voice recognition system. **

## 2025-01-07 NOTE — ASSESSMENT & PLAN NOTE
Ct home meds Hydralazine 50 mg BID, Metoprolol tartrate 12.5 mg BID, Amlodipine 10 mg daily  Diuresis as above  Monitor BP

## 2025-01-07 NOTE — PLAN OF CARE
Problem: SAFETY ADULT  Goal: Patient will remain free of falls  Description: INTERVENTIONS:  - Educate patient/family on patient safety including physical limitations  - Instruct patient to call for assistance with activity   - Consult OT/PT to assist with strengthening/mobility   - Keep Call bell within reach  - Keep bed low and locked with side rails adjusted as appropriate  - Keep care items and personal belongings within reach  - Initiate and maintain comfort rounds  - Make Fall Risk Sign visible to staff  - Offer Toileting every 2 Hours, in advance of need  - Initiate/Maintain bed alarm  - Obtain necessary fall risk management equipment  - Apply yellow socks and bracelet for high fall risk patients  - Consider moving patient to room near nurses station  Outcome: Progressing  Goal: Maintain or return to baseline ADL function  Description: INTERVENTIONS:  -  Assess patient's ability to carry out ADLs; assess patient's baseline for ADL function and identify physical deficits which impact ability to perform ADLs (bathing, care of mouth/teeth, toileting, grooming, dressing, etc.)  - Assess/evaluate cause of self-care deficits   - Assess range of motion  - Assess patient's mobility; develop plan if impaired  - Assess patient's need for assistive devices and provide as appropriate  - Encourage maximum independence but intervene and supervise when necessary  - Involve family in performance of ADLs  - Assess for home care needs following discharge   - Consider OT consult to assist with ADL evaluation and planning for discharge  - Provide patient education as appropriate  Outcome: Progressing  Goal: Maintains/Returns to pre admission functional level  Description: INTERVENTIONS:  - Perform AM-PAC 6 Click Basic Mobility/ Daily Activity assessment daily.  - Set and communicate daily mobility goal to care team and patient/family/caregiver.   - Collaborate with rehabilitation services on mobility goals if consulted  -  Perform Range of Motion 4 times a day.  - Reposition patient every 2 hours.  - Dangle patient 4 times a day  - Stand patient 3 times a day  - Ambulate patient 3 times a day  - Out of bed to chair 3 times a day   - Out of bed for meals 3 times a day  - Out of bed for toileting  - Record patient progress and toleration of activity level   Outcome: Progressing     Problem: DISCHARGE PLANNING  Goal: Discharge to home or other facility with appropriate resources  Description: INTERVENTIONS:  - Identify barriers to discharge w/patient and caregiver  - Arrange for needed discharge resources and transportation as appropriate  - Identify discharge learning needs (meds, wound care, etc.)  - Arrange for interpretive services to assist at discharge as needed  - Refer to Case Management Department for coordinating discharge planning if the patient needs post-hospital services based on physician/advanced practitioner order or complex needs related to functional status, cognitive ability, or social support system  Outcome: Progressing     Problem: Knowledge Deficit  Goal: Patient/family/caregiver demonstrates understanding of disease process, treatment plan, medications, and discharge instructions  Description: Complete learning assessment and assess knowledge base.  Interventions:  - Provide teaching at level of understanding  - Provide teaching via preferred learning methods  Outcome: Progressing     Problem: RESPIRATORY - ADULT  Goal: Achieves optimal ventilation and oxygenation  Description: INTERVENTIONS:  - Assess for changes in respiratory status  - Assess for changes in mentation and behavior  - Position to facilitate oxygenation and minimize respiratory effort  - Oxygen administered by appropriate delivery if ordered  - Initiate smoking cessation education as indicated  - Encourage broncho-pulmonary hygiene including cough, deep breathe, Incentive Spirometry  - Assess the need for suctioning and aspirate as needed  -  Assess and instruct to report SOB or any respiratory difficulty  - Respiratory Therapy support as indicated  Outcome: Progressing     Problem: Prexisting or High Potential for Compromised Skin Integrity  Goal: Skin integrity is maintained or improved  Description: INTERVENTIONS:  - Identify patients at risk for skin breakdown  - Assess and monitor skin integrity  - Assess and monitor nutrition and hydration status  - Monitor labs   - Assess for incontinence   - Turn and reposition patient  - Assist with mobility/ambulation  - Relieve pressure over bony prominences  - Avoid friction and shearing  - Provide appropriate hygiene as needed including keeping skin clean and dry  - Evaluate need for skin moisturizer/barrier cream  - Collaborate with interdisciplinary team   - Patient/family teaching  - Consider wound care consult   Outcome: Progressing

## 2025-01-07 NOTE — PROGRESS NOTES
NEPHROLOGY HOSPITAL PROGRESS NOTE   Jay Roach Jr. 79 y.o. male MRN: [id number]  Unit/Bed#: -01 Encounter: [id number]  Reason for Consult: CKD    Assessment & Plan  CKD (chronic kidney disease) stage 4, GFR 15-29 ml/min ([facility])  Chronic kidney disease with previous baseline creatinine reported at 1.8-2.2.  Ischemic cardiomyopathy  Recent echocardiogram showing ejection fraction of 65% with abnormal diastolic function.  Known history of TAVR.  No significant regurgitation.  Volume overload  Clinically appears to be volume overloaded.    Clinically responding to loop diuretic therapy, edema has improved.  Continue with IV Lasix for the next 24 hours then likely transition to oral diuretic therapy.  Hypertensive kidney disease with stage 4 chronic kidney disease (HCC)  Blood pressure currently appears to be stable  No changes from his outpatient regimen.  Mesenteric artery stenosis/recent mesenteric ischemia  Recent admission for abdominal pain with exploratory laparotomy with retrograde open SMA stent placement.  Management as per surgery.    Summary:  Overall renal function remains stable with creatinine 1.8  Continue with IV Lasix for the next 24 hours  Likely transition to oral diuretic therapy    SUBJECTIVE / 24H INTERVAL HISTORY:  Seen and examined.  Patient awake and alert.  Feeling better.  Marked decrease in lower extremity swelling.  No active chest pain or shortness of breath at rest.    OBJECTIVE:  Current Weight: Weight - Scale: 78.5 kg (173 lb)  Vitals:    01/06/25 1944 01/07/25 0114 01/07/25 0755 01/07/25 1152   BP: 139/51 (!) 147/46 160/52 (!) 156/45   BP Location:       Pulse: 65 (!) 54 67 56   Resp:   18 18   Temp: 97.9 °F (36.6 °C)  98.2 °F (36.8 °C) 97.9 °F (36.6 °C)   TempSrc:       SpO2: 96% 95% 93% 96%   Weight:       Height:           Intake/Output Summary (Last 24 hours) at 1/7/2025 1225  Last data filed at 1/7/2025 0900  Gross per 24 hour   Intake 310 ml   Output 2950 ml   Net  -2640 ml       Physical Exam  Constitutional:       Appearance: He is not ill-appearing.   Eyes:      General: No scleral icterus.  Cardiovascular:      Rate and Rhythm: Normal rate and regular rhythm.   Pulmonary:      Effort: Pulmonary effort is normal.      Breath sounds: Normal breath sounds.   Abdominal:      General: There is no distension.      Palpations: Abdomen is soft.      Tenderness: There is no abdominal tenderness.   Musculoskeletal:      Right lower leg: Edema present.      Left lower leg: Edema present.   Skin:     General: Skin is warm and dry.      Findings: No rash.   Neurological:      Mental Status: He is alert and oriented to person, place, and time.         Medications:    Current Facility-Administered Medications:     acetaminophen (TYLENOL) tablet 650 mg, 650 mg, Oral, Q4H PRN, Karen Vallejo MD    allopurinol (ZYLOPRIM) tablet 300 mg, 300 mg, Oral, Daily, Karen Vallejo MD, 300 mg at 01/07/25 0818    amLODIPine (NORVASC) tablet 10 mg, 10 mg, Oral, Daily, Karen Vallejo MD, 10 mg at 01/07/25 0818    atorvastatin (LIPITOR) tablet 80 mg, 80 mg, Oral, HS, Karen Vallejo MD, 80 mg at 01/06/25 2125    calcitriol (ROCALTROL) capsule 0.25 mcg, 0.25 mcg, Oral, Daily, Karen Vallejo MD, 0.25 mcg at 01/07/25 0819    clopidogrel (PLAVIX) tablet 75 mg, 75 mg, Oral, Daily, Karen Vallejo MD, 75 mg at 01/07/25 0818    ferrous sulfate tablet 325 mg, 325 mg, Oral, Every Other Day, Karen Vallejo MD, 325 mg at 01/07/25 0818    furosemide (LASIX) injection 40 mg, 40 mg, Intravenous, BID, Karen Vallejo MD, 40 mg at 01/07/25 0819    heparin (porcine) subcutaneous injection 5,000 Units, 5,000 Units, Subcutaneous, Q8H TRENT, Karen Vallejo MD, 5,000 Units at 01/07/25 0544    hydrALAZINE (APRESOLINE) tablet 50 mg, 50 mg, Oral, BID, Karen Vallejo MD, 50 mg at 01/07/25 0818    insulin lispro (HumALOG/ADMELOG) 100 units/mL subcutaneous injection 1-5 Units, 1-5 Units, Subcutaneous, TID  "AC **AND** Fingerstick Glucose (POCT), , , TID AC, Karen Vallejo MD    magnesium sulfate 2 g/50 mL IVPB (premix) 2 g, 2 g, Intravenous, Once, ZHANNA Alvarez, Last Rate: 25 mL/hr at 01/07/25 1035, 2 g at 01/07/25 1035    metoprolol tartrate (LOPRESSOR) partial tablet 12.5 mg, 12.5 mg, Oral, Q12H TRENT, Karen Vallejo MD, 12.5 mg at 01/07/25 0818    pancrelipase (Lip-Prot-Amyl) (CREON) delayed release capsule 24,000 Units, 24,000 Units, Oral, TID With Meals, Karen Vallejo MD, 24,000 Units at 01/07/25 0818    pantoprazole (PROTONIX) EC tablet 40 mg, 40 mg, Oral, BID, Karen Vallejo MD, 40 mg at 01/07/25 0818    [START ON 1/8/2025] potassium chloride (Klor-Con M20) CR tablet 20 mEq, 20 mEq, Oral, BID, ZHANNA Alvarez    potassium chloride (Klor-Con M20) CR tablet 40 mEq, 40 mEq, Oral, BID, ZHANNA Alvarez, 40 mEq at 01/07/25 1034    Laboratory Results:  Results from last 7 days   Lab Units 01/07/25  0626 01/06/25  1205 01/03/25  0522 01/02/25  0404 01/01/25  0249   WBC Thousand/uL 4.23* 4.60  --  5.49 4.04*   HEMOGLOBIN g/dL 7.8* 7.5*  --  8.6* 7.8*   HEMATOCRIT % 24.2* 23.5*  --  27.0* 24.6*   PLATELETS Thousands/uL 217 220  --  217 183   POTASSIUM mmol/L 3.4* 3.6 3.5 3.5 3.8   CHLORIDE mmol/L 110* 112* 110* 109* 112*   CO2 mmol/L 27 25 25 23 24   BUN mg/dL 25 26* 43* 45* 47*   CREATININE mg/dL 1.80* 1.81* 2.05* 1.99* 1.99*   CALCIUM mg/dL 7.9* 7.9* 7.7* 8.3* 8.5   MAGNESIUM mg/dL 1.8* 1.6*  --   --  2.1   PHOSPHORUS mg/dL  --   --  2.5  --   --        Portions of the record may have been created with voice recognition software. Occasional wrong word or \"sound a like\" substitutions may have occurred due to the inherent limitations of voice recognition software. Read the chart carefully and recognize, using context, where substitutions have occurred.If you have any questions, please contact the dictating provider.  "

## 2025-01-07 NOTE — ASSESSMENT & PLAN NOTE
6/2022  TTE 6/2023-Smith KYLER 3 ultra 29 TAVR bioprosthetic valve well-seated and appears function normal.  Trace paravalvular leak.  No evidence of transvalvular regurgitation.

## 2025-01-07 NOTE — CONSULTS
Consultation - Cardiology Team 1  Jay Roach Jr. 79 y.o. male MRN: [id number]  Unit/Bed#: -01 Encounter: [id number]      Assessment & Plan  Acute on chronic heart failure with preserved ejection fraction (HFpEF) ([facility])  Patient was discharged from the hospital 1/3.  He reports the start of shortness of breath shortly thereafter.  He said he was experiencing swelling legs when he was discharged.   Previous diuretic dose torsemide 10 mg daily.  He was discharged on Lasix 40 mg 3 times a week. Jardiance on hold   Chest x-ray small bilateral pleural effusions  .  No recent BNP for comparison.  Presents with negative troponins negative.   TTE 6/2023 LVEF 65%.  Grade 1 diastolic dysfunction.  No significant valvular heart disease.  RV size and function normal.    On IV Lasix 40 mg twice daily.  Has had 2 doses.  Symptomatically improved.  Reports lower extremity edema improved.  Net -3 L.  Primary hypertension  Is persistently hypertensive  Currently on amlodipine 10 mg daily, hydralazine 50 mg twice daily, Lopressor 12.5 mg twice daily.  Additionally on IV Lasix.  Coronary artery disease involving native coronary artery of native heart without angina pectoris  History of CABG.  Coronary angiogram 5/2022 patent LIMA to LAD and patent SVG to OM.  On graft to proximal RCA status post BURAK  On aspirin and Plavix, beta-blocker and statin.  Troponins negative  ECG sinus rhythm first-degree block PVC  Type 2 diabetes mellitus with stage 4 chronic kidney disease, without long-term current use of insulin ([facility])  Lab Results   Component Value Date    HGBA1C 6.6 (H) 12/24/2024         CKD (chronic kidney disease) stage 4, GFR 15-29 ml/min ([facility])  Lab Results   Component Value Date    EGFR 34 01/07/2025    EGFR 34 01/06/2025    EGFR 29 01/03/2025    CREATININE 1.80 (H) 01/07/2025    CREATININE 1.81 (H) 01/06/2025    CREATININE 2.05 (H) 01/03/2025   Baseline creatinine 1.8-2.2  Nephrology following as well  S/P TAVR  (transcatheter aortic valve replacement)  6/2022  TTE 6/2023-Smith KYLER 3 ultra 29 TAVR bioprosthetic valve well-seated and appears function normal.  Trace paravalvular leak.  No evidence of transvalvular regurgitation.  History of GI bleed  H&H stable.  7.8/24.2  Primary non-small cell carcinoma of lower lobe of left lung (HCC)    Hypertensive kidney disease with stage 4 chronic kidney disease (HCC)  Recent SHOAIB with peak creatinine 2.7.  Discharged a few days ago at 1.9.  Mesenteric artery stenosis/recent mesenteric ischemia  Status post exploratory lap 12/24, ADA, mesenteric a Russ, retrograde open mesenteric stent  PAD (peripheral artery disease) (HCC)    NSVT (nonsustained ventricular tachycardia) ([facility])  Up to 18 beat  NSVT  Currently potassium 3.4 and mag 1.8    Recommendations  Continue IV diuresis.  Strict intake and output, daily weights.  BMP while on IV IV diuretic.  Likely be able to transition to oral tomorrow.  Will need a higher maintenance dose prior to discharge.  Follow-up with echocardiogram that has been ordered.  2 g sodium restriction and 1800 cc fluid restriction.  Follow-up blood pressure with diuresis  Maintain potassium >4 and mag >2  Continue telemetry    History of Present Illness   Physician Requesting Consult: Michelle Boggs MD  Reason for Consult / Principal Problem: Acute on chronic diastolic heart failure  HPI: Jay HAY Marley Rossi. is a 79 y.o. year old male with history of CAD status post CABG -LIMA to LAD and SVG to OM and more recently PCI to the RCA 5/2022 ( grafts patent at that point), aortic stenosis status post TAVR 6/2022, hypertension, HLD, bradycardia, CKD 4, PAD ,chronic diastolic heart failure ,with history of bilateral lower extremity revascularization, severe bilateral carotid artery disease , recurrent GI bleed 12/23 in 1/24.  He presents with shortness of breath that started shortly after he got home a few days ago from the hospital.  He reports lower  extremity edema prior to discharge.  His usual diuretic dose is torsemide 10 mg daily.  Due to his recent SHOAIB he was discharged on Lasix 40 mg 3 times a week under the direction of nephrology.      He is followed by Dr. Sanchez.  Last seen in the office 11/15/2024.  On torsemide 10 mg daily.  Weight 174 pounds.    Patient was hospitalized the end of December with mesenteric ischemia.  Underwent exploratory laparotomy, retrograde open mesenteric stent.  Patient was seen by nephrology for SHOAIB.  He was discharged on Lasix 40 mg 3 times a week per nephrology.  Weight 182 pounds.  Peak creatinine 2.7, 1.9 on discharge 1/3.    Inpatient consult to Cardiology  Consult performed by: ZHANNA Alvarez  Consult ordered by: Karen Vallejo MD          Review of Systems   Constitutional:  Positive for activity change.   HENT: Negative.     Eyes: Negative.    Respiratory:  Positive for shortness of breath.    Cardiovascular:  Positive for leg swelling. Negative for chest pain and palpitations.   Gastrointestinal: Negative.    Endocrine: Negative.    Genitourinary: Negative.    Musculoskeletal:  Positive for gait problem.   Allergic/Immunologic: Negative.    Neurological:  Positive for weakness.   Hematological: Negative.    Psychiatric/Behavioral: Negative.         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 ([facility]) 09/10/2021    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 ([facility]) 02/24/2023    Basal cell carcinoma     right cheek    CAD (coronary artery disease)     Carotid stenosis, asymptomatic, bilateral     Chronic kidney disease     Colon polyp     Dependence on respirator (ventilator) status ([facility]) 04/07/2023    Diabetes ([facility])     type 2, non-insulin dependent    Diabetes mellitus (HCC)     GERD (gastroesophageal reflux disease)     History of nephrolithiasis      Hyperlipidemia     Hypertension     Left foot pain 11/30/2022    Lung cancer (HCC)     PAD (peripheral artery disease) (HCC)     Severe aortic stenosis     Squamous cell skin cancer 11/22/2022    left superior helix     Past Surgical History:   Procedure Laterality Date    AORTA - SUPERIOR MESENTERIC ARTERY BYPASS GRAFT N/A 12/24/2024    Procedure: OPEN MESENTERIC STENTING;  Surgeon: Eagle Higuera MD;  [facility];  Service: Vascular    APPENDECTOMY      ARTERIOGRAM N/A 12/24/2024    Procedure: ARTERIOGRAM;  Surgeon: Eagle Higuera MD;  [facility];  Service: Vascular    CARDIAC CATHETERIZATION N/A 05/05/2022    Procedure: CARDIAC RHC/LHC;  Surgeon: Arvin Sanchez DO;  [facility];  Service: Cardiology    CARDIAC CATHETERIZATION N/A 05/05/2022    Procedure: Cardiac Coronary Angiogram;  Surgeon: Arvin Sanchez DO;  [facility];  Service: Cardiology    CARDIAC CATHETERIZATION N/A 05/24/2022    Procedure: Cardiac pci;  Surgeon: Damien Jeter MD;  [facility];  Service: Cardiology    CARDIAC CATHETERIZATION N/A 06/14/2022    Procedure: CARDIAC TAVR;  Surgeon: Nahum Vaz MD;  [facility];  Service: Cardiology    COLECTOMY      COLONOSCOPY  2013    CORONARY ARTERY BYPASS GRAFT  2013    X 2    FEMORAL ARTERY - POPLITEAL ARTERY BYPASS GRAFT      HEMORRHOID SURGERY      IR AORTAGRAM WITH RUN-OFF  11/19/2018    IR AORTAGRAM WITH RUN-OFF  03/02/2023    IR BIOPSY LUNG  4/17/2024    IR LOWER EXTREMITY ANGIOGRAM  03/23/2023    IR MESENTERIC/VISCERAL ANGIOGRAM  12/24/2024    MOHS SURGERY Left 01/11/2023    SCC left superior helix-Dr Tovar    MD LAPS ABD PRTM&OMENTUM DX W/WO SPEC BR/WA SPX N/A 12/24/2024    Procedure: EXPLORATORY LAPAROTOMY, LYSIS OF ADHESIONS, ABDOMEN CLOSURE;  Surgeon: Alvin Flores MD;  [facility];  Service: General    MD SLCTV CATHJ 3RD+ ORD SLCTV ABDL PEL/LXTR BRNCH Left 08/12/2016     Procedure: LEFT FEMORAL ARTERIOGRAM; BALLOON ANGIOPLASTY; SFA  AND FEMORAL AT VEIN GRAFT;  Surgeon: Nicholas Urena MD;  [facility];  Service: Vascular    AL TEAEC W/WO PATCH GRAFT COMMON FEMORAL Right 03/23/2023    Procedure: Common femoral endarterectomy&antegrade intervention of SFA, popliteal artery w/ shockwave;  Surgeon: Eagle Higuera MD;  [facility];  Service: Vascular    AL TRANSCATHETER TRANSAPICAL REPLACEMT AORTIC VALVE N/A 06/14/2022    Procedure: REPLACEMENT AORTIC VALVE TRANSCATHETER (TAVR) TRANSAPICAL 29MM IRVIN KYLER S3 ULTRA VALVE(ACCESS ON LEFT) ELANA;  Surgeon: Amarjit Gordon DO;  [facility];  Service: Cardiac Surgery    SKIN CANCER EXCISION      TONSILLECTOMY AND ADENOIDECTOMY      UPPER GASTROINTESTINAL ENDOSCOPY       Social History     Substance and Sexual Activity   Alcohol Use Not Currently    Comment: rare     Social History     Substance and Sexual Activity   Drug Use No     Social History     Tobacco Use   Smoking Status Every Day    Current packs/day: 1.00    Average packs/day: 0.6 packs/day for 100.0 years (62.5 ttl pk-yrs)    Types: Cigarettes    Passive exposure: Current   Smokeless Tobacco Never   Tobacco Comments    4 cigarettes per day     Family History:   Family History   Problem Relation Age of Onset    Heart attack Father     Other Sister         bypass and vlave replacement    Stroke Paternal Uncle     Arrhythmia Neg Hx     Asthma Neg Hx     Clotting disorder Neg Hx     Fainting Neg Hx     Anuerysm Neg Hx     Hypertension Neg Hx         unsure     Hyperlipidemia Neg Hx     Heart failure Neg Hx        Meds/Allergies   current meds:   Current Facility-Administered Medications:     acetaminophen (TYLENOL) tablet 650 mg, Q4H PRN    allopurinol (ZYLOPRIM) tablet 300 mg, Daily    amLODIPine (NORVASC) tablet 10 mg, Daily    atorvastatin (LIPITOR) tablet 80 mg, HS    calcitriol (ROCALTROL) capsule 0.25 mcg, Daily    clopidogrel (PLAVIX) tablet  "75 mg, Daily    ferrous sulfate tablet 325 mg, Every Other Day    furosemide (LASIX) injection 40 mg, BID    heparin (porcine) subcutaneous injection 5,000 Units, Q8H TRENT    hydrALAZINE (APRESOLINE) tablet 50 mg, BID    insulin lispro (HumALOG/ADMELOG) 100 units/mL subcutaneous injection 1-5 Units, TID AC **AND** Fingerstick Glucose (POCT), TID AC    metoprolol tartrate (LOPRESSOR) partial tablet 12.5 mg, Q12H TRENT    pancrelipase (Lip-Prot-Amyl) (CREON) delayed release capsule 24,000 Units, TID With Meals    pantoprazole (PROTONIX) EC tablet 40 mg, BID and PTA meds:    Medications Prior to Admission:     allopurinol (ZYLOPRIM) 300 mg tablet    amLODIPine (NORVASC) 10 mg tablet    atorvastatin (LIPITOR) 80 mg tablet    calcitriol (ROCALTROL) 0.25 mcg capsule    clopidogrel (PLAVIX) 75 mg tablet    Empagliflozin (Jardiance) 10 MG TABS tablet    ferrous sulfate 325 (65 Fe) mg tablet    furosemide (LASIX) 40 mg tablet    glimepiride (AMARYL) 1 mg tablet    hydrALAZINE (APRESOLINE) 50 mg tablet    melatonin 3 mg    metoprolol tartrate (LOPRESSOR) 25 mg tablet    pancrelipase, Lip-Prot-Amyl, (Creon) 24,000 units    pantoprazole (PROTONIX) 40 mg tablet  No Known Allergies    Objective   Vitals: Blood pressure 160/52, pulse 67, temperature 98.2 °F (36.8 °C), resp. rate 18, height 5' 10\" (1.778 m), weight 78.5 kg (173 lb), SpO2 93%.  Orthostatic Blood Pressures      Flowsheet Row Most Recent Value   Blood Pressure 160/52 filed at 01/07/2025 0755   Patient Position - Orthostatic VS Lying filed at 01/06/2025 1600              Intake/Output Summary (Last 24 hours) at 1/7/2025 0848  Last data filed at 1/7/2025 0501  Gross per 24 hour   Intake --   Output 2950 ml   Net -2950 ml       Invasive Devices       Peripheral Intravenous Line  Duration             Peripheral IV 01/06/25 Left;Ventral (anterior) Hand <1 day    Peripheral IV 01/06/25 Right Antecubital <1 day              Drain  Duration             External Urinary Catheter " "Small <1 day                    Physical Exam: /52   Pulse 67   Temp 98.2 °F (36.8 °C)   Resp 18   Ht 5' 10\" (1.778 m)   Wt 78.5 kg (173 lb)   SpO2 93%   BMI 24.82 kg/m²   General Appearance:    Alert, cooperative, no distress, appears stated age   Head:    Normocephalic, no scleral icterus   Eyes:    PERRL   Nose:   Nares normal, septum midline, mucosa normal, no drainage    Throat:   Lips, mucosa, and tongue normal   Neck:   Supple, symmetrical, trachea midline     no carotid bruit or JVD   Back:     Symmetric   Lungs:     Clear to auscultation bilaterally, respirations unlabored   Chest Wall:    No tenderness or deformity    Heart:    Regular rate and rhythm, S1 and S2 normal, no murmur, rub   or gallop   Abdomen:     Soft, non-tender, bowel sounds active all four quadrants,     no masses, no organomegaly   Extremities:   Extremities normal, atraumatic, no cyanosis or edema   Pulses:   2+ and symmetric all extremities   Skin:   Skin color, texture, turgor normal, no rashes or lesions   Neurologic:   Alert and oriented to person place and time. No focal deficits       Lab Results:   Recent Results (from the past 72 hours)   ECG 12 lead    Collection Time: 01/06/25 10:58 AM   Result Value Ref Range    Ventricular Rate 69 BPM    Atrial Rate 69 BPM    SD Interval 256 ms    QRSD Interval 108 ms    QT Interval 436 ms    QTC Interval 467 ms    P Axis 77 degrees    QRS Axis 49 degrees    T Wave Axis 86 degrees   CBC and differential    Collection Time: 01/06/25 12:05 PM   Result Value Ref Range    WBC 4.60 4.31 - 10.16 Thousand/uL    RBC 2.40 (L) 3.88 - 5.62 Million/uL    Hemoglobin 7.5 (L) 12.0 - 17.0 g/dL    Hematocrit 23.5 (L) 36.5 - 49.3 %    MCV 98 82 - 98 fL    MCH 31.3 26.8 - 34.3 pg    MCHC 31.9 31.4 - 37.4 g/dL    RDW 16.2 (H) 11.6 - 15.1 %    MPV 10.7 8.9 - 12.7 fL    Platelets 220 149 - 390 Thousands/uL    nRBC 0 /100 WBCs    Segmented % 76 (H) 43 - 75 %    Immature Grans % 1 0 - 2 %    " Lymphocytes % 13 (L) 14 - 44 %    Monocytes % 8 4 - 12 %    Eosinophils Relative 2 0 - 6 %    Basophils Relative 0 0 - 1 %    Absolute Neutrophils 3.47 1.85 - 7.62 Thousands/µL    Absolute Immature Grans 0.05 0.00 - 0.20 Thousand/uL    Absolute Lymphocytes 0.61 0.60 - 4.47 Thousands/µL    Absolute Monocytes 0.38 0.17 - 1.22 Thousand/µL    Eosinophils Absolute 0.08 0.00 - 0.61 Thousand/µL    Basophils Absolute 0.01 0.00 - 0.10 Thousands/µL   Comprehensive metabolic panel    Collection Time: 01/06/25 12:05 PM   Result Value Ref Range    Sodium 142 135 - 147 mmol/L    Potassium 3.6 3.5 - 5.3 mmol/L    Chloride 112 (H) 96 - 108 mmol/L    CO2 25 21 - 32 mmol/L    ANION GAP 5 4 - 13 mmol/L    BUN 26 (H) 5 - 25 mg/dL    Creatinine 1.81 (H) 0.60 - 1.30 mg/dL    Glucose 126 65 - 140 mg/dL    Calcium 7.9 (L) 8.4 - 10.2 mg/dL    Corrected Calcium 8.8 8.3 - 10.1 mg/dL    AST 19 13 - 39 U/L    ALT 20 7 - 52 U/L    Alkaline Phosphatase 68 34 - 104 U/L    Total Protein 5.2 (L) 6.4 - 8.4 g/dL    Albumin 2.9 (L) 3.5 - 5.0 g/dL    Total Bilirubin 0.48 0.20 - 1.00 mg/dL    eGFR 34 ml/min/1.73sq m   Lactic acid    Collection Time: 01/06/25 12:05 PM   Result Value Ref Range    LACTIC ACID 0.8 0.5 - 2.0 mmol/L   Procalcitonin    Collection Time: 01/06/25 12:05 PM   Result Value Ref Range    Procalcitonin 0.22 <=0.25 ng/ml   Protime-INR    Collection Time: 01/06/25 12:05 PM   Result Value Ref Range    Protime 13.6 12.3 - 15.0 seconds    INR 1.01 0.85 - 1.19   APTT    Collection Time: 01/06/25 12:05 PM   Result Value Ref Range    PTT 28 23 - 34 seconds   FLU/COVID Rapid Antigen (30 min. TAT) - Preferred screening test in ED    Collection Time: 01/06/25 12:05 PM    Specimen: Nose; Nares   Result Value Ref Range    SARS COV Rapid Antigen Negative Negative    Influenza A Rapid Antigen Negative Negative    Influenza B Rapid Antigen Negative Negative   B-Type Natriuretic Peptide(BNP)    Collection Time: 01/06/25 12:05 PM   Result Value Ref  "Range     (H) 0 - 100 pg/mL   HS Troponin 0hr (reflex protocol)    Collection Time: 01/06/25 12:05 PM   Result Value Ref Range    hs TnI 0hr 38 \"Refer to ACS Flowchart\"- see link ng/L   Magnesium    Collection Time: 01/06/25 12:05 PM   Result Value Ref Range    Magnesium 1.6 (L) 1.9 - 2.7 mg/dL   UA w Reflex to Microscopic w Reflex to Culture    Collection Time: 01/06/25  1:52 PM    Specimen: Urine, Clean Catch   Result Value Ref Range    Color, UA Light Yellow     Clarity, UA Clear     Specific Gravity, UA 1.011 1.003 - 1.030    pH, UA 5.5 4.5, 5.0, 5.5, 6.0, 6.5, 7.0, 7.5, 8.0    Leukocytes, UA Negative Negative    Nitrite, UA Negative Negative    Protein, UA 70 (1+) (A) Negative mg/dl    Glucose, UA Negative Negative mg/dl    Ketones, UA Negative Negative mg/dl    Urobilinogen, UA <2.0 <2.0 mg/dl mg/dl    Bilirubin, UA Negative Negative    Occult Blood, UA Negative Negative   Urine Microscopic    Collection Time: 01/06/25  1:52 PM   Result Value Ref Range    RBC, UA None Seen None Seen, 1-2 /hpf    WBC, UA 1-2 None Seen, 1-2 /hpf    Epithelial Cells None Seen None Seen, Occasional /hpf    Bacteria, UA None Seen None Seen, Occasional /hpf   HS Troponin I 2hr    Collection Time: 01/06/25  2:31 PM   Result Value Ref Range    hs TnI 2hr 32 \"Refer to ACS Flowchart\"- see link ng/L    Delta 2hr hsTnI -6 <20 ng/L   HS Troponin I 4hr    Collection Time: 01/06/25  5:48 PM   Result Value Ref Range    hs TnI 4hr 34 \"Refer to ACS Flowchart\"- see link ng/L    Delta 4hr hsTnI -4 <20 ng/L   Fingerstick Glucose (POCT)    Collection Time: 01/06/25  9:04 PM   Result Value Ref Range    POC Glucose 142 (H) 65 - 140 mg/dl   Basic metabolic panel    Collection Time: 01/07/25  6:26 AM   Result Value Ref Range    Sodium 144 135 - 147 mmol/L    Potassium 3.4 (L) 3.5 - 5.3 mmol/L    Chloride 110 (H) 96 - 108 mmol/L    CO2 27 21 - 32 mmol/L    ANION GAP 7 4 - 13 mmol/L    BUN 25 5 - 25 mg/dL    Creatinine 1.80 (H) 0.60 - 1.30 mg/dL "    Glucose 113 65 - 140 mg/dL    Calcium 7.9 (L) 8.4 - 10.2 mg/dL    eGFR 34 ml/min/1.73sq m   Magnesium    Collection Time: 01/07/25  6:26 AM   Result Value Ref Range    Magnesium 1.8 (L) 1.9 - 2.7 mg/dL   CBC (With Platelets)    Collection Time: 01/07/25  6:26 AM   Result Value Ref Range    WBC 4.23 (L) 4.31 - 10.16 Thousand/uL    RBC 2.48 (L) 3.88 - 5.62 Million/uL    Hemoglobin 7.8 (L) 12.0 - 17.0 g/dL    Hematocrit 24.2 (L) 36.5 - 49.3 %    MCV 98 82 - 98 fL    MCH 31.5 26.8 - 34.3 pg    MCHC 32.2 31.4 - 37.4 g/dL    RDW 16.2 (H) 11.6 - 15.1 %    Platelets 217 149 - 390 Thousands/uL    MPV 10.8 8.9 - 12.7 fL   Fingerstick Glucose (POCT)    Collection Time: 01/07/25  7:54 AM   Result Value Ref Range    POC Glucose 114 65 - 140 mg/dl     Imaging: I have personally reviewed pertinent reports.    EKG: Sinus rhythm first-degree AV block PVC    Code Status: Level 1 - Full Code  Advance Directive and Living Will: Yes    Power of : Yes  POLST:      Counseling / Coordination of Care  Total floor / unit time spent today 45 minutes.  Greater than 50% of total time was spent with the patient and / or family counseling and / or coordination of care.

## 2025-01-07 NOTE — ASSESSMENT & PLAN NOTE
Patient was discharged from the hospital 1/3.  He reports the start of shortness of breath shortly thereafter.  He said he was experiencing swelling legs when he was discharged.   Previous diuretic dose torsemide 10 mg daily.  He was discharged on Lasix 40 mg 3 times a week. Jardiance on hold   Chest x-ray small bilateral pleural effusions  .  No recent BNP for comparison.  Presents with negative troponins negative.   TTE 6/2023 LVEF 65%.  Grade 1 diastolic dysfunction.  No significant valvular heart disease.  RV size and function normal.    On IV Lasix 40 mg twice daily.  Has had 2 doses.  Symptomatically improved.  Reports lower extremity edema improved.  Net -3 L.

## 2025-01-07 NOTE — ASSESSMENT & PLAN NOTE
Lab Results   Component Value Date    HGBA1C 6.6 (H) 12/24/2024       Recent Labs     01/06/25  2104 01/07/25  0754 01/07/25  1152   POCGLU 142* 114 144*       Blood Sugar Average: Last 72 hrs:  (P) [id number]  Home regimen: Glimepiride 1 mg daily, Empagliflozin 10 mg daily - hold while inpatient  Sliding scale insulin  Monitor blood sugars and adjust insulin accordingly

## 2025-01-08 ENCOUNTER — TELEPHONE (OUTPATIENT)
Dept: VASCULAR SURGERY | Facility: CLINIC | Age: 80
End: 2025-01-08

## 2025-01-08 ENCOUNTER — APPOINTMENT (INPATIENT)
Dept: NON INVASIVE DIAGNOSTICS | Facility: HOSPITAL | Age: 80
DRG: 291 | End: 2025-01-08
Payer: MEDICARE

## 2025-01-08 LAB
ANION GAP SERPL CALCULATED.3IONS-SCNC: 9 MMOL/L (ref 4–13)
AORTIC ROOT: 2.7 CM
AORTIC VALVE MEAN VELOCITY: 12.2 M/S
ASCENDING AORTA: 3.8 CM
AV AREA BY CONTINUOUS VTI: 2.2 CM2
AV AREA PEAK VELOCITY: 2.1 CM2
AV LVOT MEAN GRADIENT: 2 MMHG
AV LVOT PEAK GRADIENT: 3 MMHG
AV MEAN PRESS GRAD SYS DOP V1V2: 7 MMHG
AV ORIFICE AREA US: 2.21 CM2
AV PEAK GRADIENT: 11 MMHG
AV VELOCITY RATIO: 0.51
AV VMAX SYS DOP: 1.67 M/S
BSA FOR ECHO PROCEDURE: 1.93 M2
BUN SERPL-MCNC: 28 MG/DL (ref 5–25)
CALCIUM SERPL-MCNC: 8.2 MG/DL (ref 8.4–10.2)
CHLORIDE SERPL-SCNC: 106 MMOL/L (ref 96–108)
CO2 SERPL-SCNC: 27 MMOL/L (ref 21–32)
CREAT SERPL-MCNC: 1.93 MG/DL (ref 0.6–1.3)
DOP CALC AO VTI: 47.88 CM
DOP CALC LVOT AREA: 4.15 CM2
DOP CALC LVOT CARDIAC INDEX: 2.53 L/MIN/M2
DOP CALC LVOT CARDIAC OUTPUT: 4.89 L/MIN
DOP CALC LVOT DIAMETER: 2.3 CM
DOP CALC LVOT PEAK VEL VTI: 25.52 CM
DOP CALC LVOT PEAK VEL: 0.85 M/S
DOP CALC LVOT STROKE INDEX: 54.9 ML/M2
DOP CALC LVOT STROKE VOLUME: 105.98
E WAVE DECELERATION TIME: 410 MS
E/A RATIO: 0.73
FRACTIONAL SHORTENING: 37 (ref 28–44)
GFR SERPL CREATININE-BSD FRML MDRD: 31 ML/MIN/1.73SQ M
GLUCOSE SERPL-MCNC: 118 MG/DL (ref 65–140)
GLUCOSE SERPL-MCNC: 124 MG/DL (ref 65–140)
GLUCOSE SERPL-MCNC: 131 MG/DL (ref 65–140)
GLUCOSE SERPL-MCNC: 143 MG/DL (ref 65–140)
GLUCOSE SERPL-MCNC: 146 MG/DL (ref 65–140)
INTERVENTRICULAR SEPTUM IN DIASTOLE (PARASTERNAL SHORT AXIS VIEW): 1.4 CM
INTERVENTRICULAR SEPTUM: 1.4 CM (ref 0.6–1.1)
LAAS-AP2: 23.6 CM2
LAAS-AP4: 24.4 CM2
LEFT ATRIUM SIZE: 4.9 CM
LEFT ATRIUM VOLUME (MOD BIPLANE): 84 ML
LEFT ATRIUM VOLUME INDEX (MOD BIPLANE): 43.5 ML/M2
LEFT INTERNAL DIMENSION IN SYSTOLE: 3.1 CM (ref 2.1–4)
LEFT VENTRICULAR INTERNAL DIMENSION IN DIASTOLE: 4.9 CM (ref 3.5–6)
LEFT VENTRICULAR POSTERIOR WALL IN END DIASTOLE: 1.2 CM
LEFT VENTRICULAR STROKE VOLUME: 75 ML
LV EF US.2D.A4C+ESTIMATED: 78 %
LVSV (TEICH): 75 ML
MV E'TISSUE VEL-SEP: 6 CM/S
MV PEAK A VEL: 1.24 M/S
MV PEAK E VEL: 90 CM/S
MV STENOSIS PRESSURE HALF TIME: 119 MS
MV VALVE AREA P 1/2 METHOD: 1.85
POTASSIUM SERPL-SCNC: 3.9 MMOL/L (ref 3.5–5.3)
RA PRESSURE ESTIMATED: 3 MMHG
RIGHT ATRIAL 2D VOLUME: 30 ML
RIGHT ATRIUM AREA SYSTOLE A4C: 14.2 CM2
RIGHT VENTRICLE ID DIMENSION: 3.4 CM
SL CV LEFT ATRIUM LENGTH A2C: 5.3 CM
SL CV LV EF: 65
SL CV PED ECHO LEFT VENTRICLE DIASTOLIC VOLUME (MOD BIPLANE) 2D: 114 ML
SL CV PED ECHO LEFT VENTRICLE SYSTOLIC VOLUME (MOD BIPLANE) 2D: 39 ML
SODIUM SERPL-SCNC: 142 MMOL/L (ref 135–147)
TRICUSPID ANNULAR PLANE SYSTOLIC EXCURSION: 2.2 CM

## 2025-01-08 PROCEDURE — 99232 SBSQ HOSP IP/OBS MODERATE 35: CPT | Performed by: PHYSICIAN ASSISTANT

## 2025-01-08 PROCEDURE — 99232 SBSQ HOSP IP/OBS MODERATE 35: CPT | Performed by: INTERNAL MEDICINE

## 2025-01-08 PROCEDURE — 80048 BASIC METABOLIC PNL TOTAL CA: CPT | Performed by: PHYSICIAN ASSISTANT

## 2025-01-08 PROCEDURE — 93306 TTE W/DOPPLER COMPLETE: CPT

## 2025-01-08 PROCEDURE — 82948 REAGENT STRIP/BLOOD GLUCOSE: CPT

## 2025-01-08 PROCEDURE — 93306 TTE W/DOPPLER COMPLETE: CPT | Performed by: INTERNAL MEDICINE

## 2025-01-08 RX ORDER — FUROSEMIDE 10 MG/ML
40 INJECTION INTRAMUSCULAR; INTRAVENOUS
Status: DISCONTINUED | OUTPATIENT
Start: 2025-01-08 | End: 2025-01-09

## 2025-01-08 RX ORDER — LANOLIN ALCOHOL/MO/W.PET/CERES
400 CREAM (GRAM) TOPICAL DAILY
Status: DISCONTINUED | OUTPATIENT
Start: 2025-01-08 | End: 2025-01-09

## 2025-01-08 RX ADMIN — HYDRALAZINE HYDROCHLORIDE 75 MG: 25 TABLET ORAL at 21:24

## 2025-01-08 RX ADMIN — PANCRELIPASE 24000 UNITS: 120000; 24000; 76000 CAPSULE, DELAYED RELEASE PELLETS ORAL at 17:17

## 2025-01-08 RX ADMIN — MAGNESIUM OXIDE TAB 400 MG (241.3 MG ELEMENTAL MG) 400 MG: 400 (241.3 MG) TAB at 13:50

## 2025-01-08 RX ADMIN — POTASSIUM CHLORIDE 20 MEQ: 1500 TABLET, EXTENDED RELEASE ORAL at 17:17

## 2025-01-08 RX ADMIN — CALCITRIOL CAPSULES 0.25 MCG 0.25 MCG: 0.25 CAPSULE ORAL at 08:33

## 2025-01-08 RX ADMIN — Medication 12.5 MG: at 08:33

## 2025-01-08 RX ADMIN — HEPARIN SODIUM 5000 UNITS: 5000 INJECTION, SOLUTION INTRAVENOUS; SUBCUTANEOUS at 21:24

## 2025-01-08 RX ADMIN — PANCRELIPASE 24000 UNITS: 120000; 24000; 76000 CAPSULE, DELAYED RELEASE PELLETS ORAL at 08:33

## 2025-01-08 RX ADMIN — ALLOPURINOL 300 MG: 300 TABLET ORAL at 08:34

## 2025-01-08 RX ADMIN — ATORVASTATIN CALCIUM 80 MG: 80 TABLET, FILM COATED ORAL at 21:24

## 2025-01-08 RX ADMIN — FUROSEMIDE 40 MG: 10 INJECTION, SOLUTION INTRAMUSCULAR; INTRAVENOUS at 12:27

## 2025-01-08 RX ADMIN — Medication 12.5 MG: at 21:24

## 2025-01-08 RX ADMIN — PANCRELIPASE 24000 UNITS: 120000; 24000; 76000 CAPSULE, DELAYED RELEASE PELLETS ORAL at 12:27

## 2025-01-08 RX ADMIN — PANTOPRAZOLE SODIUM 40 MG: 40 TABLET, DELAYED RELEASE ORAL at 08:33

## 2025-01-08 RX ADMIN — AMLODIPINE BESYLATE 10 MG: 10 TABLET ORAL at 08:33

## 2025-01-08 RX ADMIN — HYDRALAZINE HYDROCHLORIDE 50 MG: 50 TABLET ORAL at 08:33

## 2025-01-08 RX ADMIN — PANTOPRAZOLE SODIUM 40 MG: 40 TABLET, DELAYED RELEASE ORAL at 21:24

## 2025-01-08 RX ADMIN — HEPARIN SODIUM 5000 UNITS: 5000 INJECTION, SOLUTION INTRAVENOUS; SUBCUTANEOUS at 05:22

## 2025-01-08 RX ADMIN — POTASSIUM CHLORIDE 20 MEQ: 1500 TABLET, EXTENDED RELEASE ORAL at 08:33

## 2025-01-08 RX ADMIN — CLOPIDOGREL BISULFATE 75 MG: 75 TABLET ORAL at 08:33

## 2025-01-08 RX ADMIN — HEPARIN SODIUM 5000 UNITS: 5000 INJECTION, SOLUTION INTRAVENOUS; SUBCUTANEOUS at 13:50

## 2025-01-08 NOTE — TELEPHONE ENCOUNTER
"Patient is s/p exploratory laparotomy with retrograde open SMA stent on 12/24 with Dr. Higuera.  Per AVS, patient to \"Schedule an appointment with Eagle Higuera MD (Vascular Surgery) in 2 weeks (1/17/2025); Please make a follow up appointment in the vascular center in 1-2 weeks\".    Patient readmitted to [facility] on 1/6/25.  Will send message to Vascular Clerical to contact patient to schedule Hospital Follow up per AVS.   "

## 2025-01-08 NOTE — PROGRESS NOTES
Progress Note - Cardiology   Name: Jay Roach Jr. 79 y.o. male I MRN: [id number]  Unit/Bed#: -01 I Date of Admission: 1/6/2025   Date of Service: 1/8/2025 I Hospital Day: 2     Assessment & Plan  Acute on chronic heart failure with preserved ejection fraction (HFpEF) ([facility])  Patient was discharged from the hospital 1/3.  He reports the start of shortness of breath shortly thereafter.  He said he was experiencing swelling legs when he was discharged.   Previous diuretic dose torsemide 10 mg daily.  He was discharged on Lasix 40 mg 3 times a week. Jardiance on hold   Chest x-ray small bilateral pleural effusions  .  No recent BNP for comparison.  Presents with negative troponins negative.   TTE 6/2023 LVEF 65%.  Grade 1 diastolic dysfunction.  No significant valvular heart disease.  RV size and function normal.    On IV Lasix 40 mg twice daily.  Net -1230  24 hr.  Weight 166 lbs. Stated pre hospital 173.bs.   Will continue IV diuretic- edema/rales/JVD  Will f/u with current echo  Primary hypertension  Is persistently hypertensive  Currently on amlodipine 10 mg daily, hydralazine 50 mg twice daily, Lopressor 12.5 mg twice daily.  Additionally on IV Lasix.  Increase hydralazine 75mg BID.   Coronary artery disease involving native coronary artery of native heart without angina pectoris  History of CABG.  Coronary angiogram 5/2022 patent LIMA to LAD and patent SVG to OM.  On graft to proximal RCA status post BURAK  On aspirin and Plavix, beta-blocker and statin.  Troponins negative  ECG sinus rhythm first-degree block PVC  Type 2 diabetes mellitus with stage 4 chronic kidney disease, without long-term current use of insulin ([facility])  Lab Results   Component Value Date    HGBA1C 6.6 (H) 12/24/2024         CKD (chronic kidney disease) stage 4, GFR 15-29 ml/min ([facility])  Lab Results   Component Value Date    EGFR 31 01/08/2025    EGFR 34 01/07/2025    EGFR 34 01/06/2025    CREATININE 1.93 (H) 01/08/2025     "CREATININE 1.80 (H) 01/07/2025    CREATININE 1.81 (H) 01/06/2025   Baseline creatinine 1.8-2.2  Nephrology following as well  S/P TAVR (transcatheter aortic valve replacement)  6/2022  TTE 6/2023-Smith KYLER 3 ultra 29 TAVR bioprosthetic valve well-seated and appears function normal.  Trace paravalvular leak.  No evidence of transvalvular regurgitation.  History of GI bleed  H&H stable.  7.8/24.2  Primary non-small cell carcinoma of lower lobe of left lung (HCC)    Hypertensive kidney disease with stage 4 chronic kidney disease (HCC)  Recent SHOAIB with peak creatinine 2.7.  Discharged a few days ago at 1.9. Today 1.93  Mesenteric artery stenosis/recent mesenteric ischemia  Status post exploratory lap 12/24, ADA, mesenteric agram , retrograde open mesenteric stent  PAD (peripheral artery disease) ([facility])    NSVT (nonsustained ventricular tachycardia) ([facility])  Up to 18 beat  NSVT in the setting of low potassium and magnesium  Would resume telemetry while diuresing.  Maintain potassium >4 and mag >2        Subjective/Objective   Chief Complaint/subjective  No events overnight  No cp, sob improved.       Vitals: /54   Pulse 64   Temp 98 °F (36.7 °C)   Resp 16   Ht 5' 10\" (1.778 m)   Wt 78.5 kg (173 lb)   SpO2 92%   BMI 24.82 kg/m²     Vitals:    01/06/25 1020   Weight: 78.5 kg (173 lb)     Orthostatic Blood Pressures      Flowsheet Row Most Recent Value   Blood Pressure 169/54 filed at 01/08/2025 0755   Patient Position - Orthostatic VS Lying filed at 01/06/2025 1600              Intake/Output Summary (Last 24 hours) at 1/8/2025 0910  Last data filed at 1/8/2025 0526  Gross per 24 hour   Intake 120 ml   Output 1350 ml   Net -1230 ml       Invasive Devices       Peripheral Intravenous Line  Duration             Peripheral IV 01/06/25 Right Antecubital 1 day                      Current Facility-Administered Medications:     acetaminophen (TYLENOL) tablet 650 mg, Q4H PRN    allopurinol (ZYLOPRIM) tablet 300 mg, " "Daily    amLODIPine (NORVASC) tablet 10 mg, Daily    atorvastatin (LIPITOR) tablet 80 mg, HS    calcitriol (ROCALTROL) capsule 0.25 mcg, Daily    clopidogrel (PLAVIX) tablet 75 mg, Daily    ferrous sulfate tablet 325 mg, Every Other Day    heparin (porcine) subcutaneous injection 5,000 Units, Q8H TRENT    hydrALAZINE (APRESOLINE) tablet 50 mg, BID    insulin lispro (HumALOG/ADMELOG) 100 units/mL subcutaneous injection 1-5 Units, TID AC **AND** Fingerstick Glucose (POCT), TID AC    metoprolol tartrate (LOPRESSOR) partial tablet 12.5 mg, Q12H TRENT    pancrelipase (Lip-Prot-Amyl) (CREON) delayed release capsule 24,000 Units, TID With Meals    pantoprazole (PROTONIX) EC tablet 40 mg, BID    potassium chloride (Klor-Con M20) CR tablet 20 mEq, BID      Physical Exam: /54   Pulse 64   Temp 98 °F (36.7 °C)   Resp 16   Ht 5' 10\" (1.778 m)   Wt 78.5 kg (173 lb)   SpO2 92%   BMI 24.82 kg/m²     General Appearance:    Alert, cooperative, no distress, appears stated age   Head:    Normocephalic, no scleral icterus   Eyes:    PERRL   Nose:   Nares normal, septum midline, no drainage    Throat:   Lips, mucosa, and tongue normal   Neck:   Supple, symmetrical, trachea midline,       no carotid    + JVD       Lungs:    Rales  to auscultation bilaterally, respirations unlabored        Heart:    Regular rate and rhythm, S1 and S2 normal, no murmur, rub   or gallop   Abdomen:     Soft, non-tender, bowel sounds active all four quadrants,     no masses, no organomegaly   Extremities:   Extremities normal, atraumatic, mild ankle edema   Pulses:   2+ and symmetric all extremities   Skin:   Skin color, texture, turgor normal, no rashes or lesions   Neurologic:   Alert and oriented to person place and time, no focal deficits                 Lab Results:   Recent Results (from the past 72 hours)   ECG 12 lead    Collection Time: 01/06/25 10:58 AM   Result Value Ref Range    Ventricular Rate 69 BPM    Atrial Rate 69 BPM    LA Interval " 256 ms    QRSD Interval 108 ms    QT Interval 436 ms    QTC Interval 467 ms    P Axis 77 degrees    QRS Axis 49 degrees    T Wave Axis 86 degrees   CBC and differential    Collection Time: 01/06/25 12:05 PM   Result Value Ref Range    WBC 4.60 4.31 - 10.16 Thousand/uL    RBC 2.40 (L) 3.88 - 5.62 Million/uL    Hemoglobin 7.5 (L) 12.0 - 17.0 g/dL    Hematocrit 23.5 (L) 36.5 - 49.3 %    MCV 98 82 - 98 fL    MCH 31.3 26.8 - 34.3 pg    MCHC 31.9 31.4 - 37.4 g/dL    RDW 16.2 (H) 11.6 - 15.1 %    MPV 10.7 8.9 - 12.7 fL    Platelets 220 149 - 390 Thousands/uL    nRBC 0 /100 WBCs    Segmented % 76 (H) 43 - 75 %    Immature Grans % 1 0 - 2 %    Lymphocytes % 13 (L) 14 - 44 %    Monocytes % 8 4 - 12 %    Eosinophils Relative 2 0 - 6 %    Basophils Relative 0 0 - 1 %    Absolute Neutrophils 3.47 1.85 - 7.62 Thousands/µL    Absolute Immature Grans 0.05 0.00 - 0.20 Thousand/uL    Absolute Lymphocytes 0.61 0.60 - 4.47 Thousands/µL    Absolute Monocytes 0.38 0.17 - 1.22 Thousand/µL    Eosinophils Absolute 0.08 0.00 - 0.61 Thousand/µL    Basophils Absolute 0.01 0.00 - 0.10 Thousands/µL   Comprehensive metabolic panel    Collection Time: 01/06/25 12:05 PM   Result Value Ref Range    Sodium 142 135 - 147 mmol/L    Potassium 3.6 3.5 - 5.3 mmol/L    Chloride 112 (H) 96 - 108 mmol/L    CO2 25 21 - 32 mmol/L    ANION GAP 5 4 - 13 mmol/L    BUN 26 (H) 5 - 25 mg/dL    Creatinine 1.81 (H) 0.60 - 1.30 mg/dL    Glucose 126 65 - 140 mg/dL    Calcium 7.9 (L) 8.4 - 10.2 mg/dL    Corrected Calcium 8.8 8.3 - 10.1 mg/dL    AST 19 13 - 39 U/L    ALT 20 7 - 52 U/L    Alkaline Phosphatase 68 34 - 104 U/L    Total Protein 5.2 (L) 6.4 - 8.4 g/dL    Albumin 2.9 (L) 3.5 - 5.0 g/dL    Total Bilirubin 0.48 0.20 - 1.00 mg/dL    eGFR 34 ml/min/1.73sq m   Lactic acid    Collection Time: 01/06/25 12:05 PM   Result Value Ref Range    LACTIC ACID 0.8 0.5 - 2.0 mmol/L   Procalcitonin    Collection Time: 01/06/25 12:05 PM   Result Value Ref Range    Procalcitonin  "0.22 <=0.25 ng/ml   Protime-INR    Collection Time: 01/06/25 12:05 PM   Result Value Ref Range    Protime 13.6 12.3 - 15.0 seconds    INR 1.01 0.85 - 1.19   APTT    Collection Time: 01/06/25 12:05 PM   Result Value Ref Range    PTT 28 23 - 34 seconds   FLU/COVID Rapid Antigen (30 min. TAT) - Preferred screening test in ED    Collection Time: 01/06/25 12:05 PM    Specimen: Nose; Nares   Result Value Ref Range    SARS COV Rapid Antigen Negative Negative    Influenza A Rapid Antigen Negative Negative    Influenza B Rapid Antigen Negative Negative   B-Type Natriuretic Peptide(BNP)    Collection Time: 01/06/25 12:05 PM   Result Value Ref Range     (H) 0 - 100 pg/mL   HS Troponin 0hr (reflex protocol)    Collection Time: 01/06/25 12:05 PM   Result Value Ref Range    hs TnI 0hr 38 \"Refer to ACS Flowchart\"- see link ng/L   Magnesium    Collection Time: 01/06/25 12:05 PM   Result Value Ref Range    Magnesium 1.6 (L) 1.9 - 2.7 mg/dL   UA w Reflex to Microscopic w Reflex to Culture    Collection Time: 01/06/25  1:52 PM    Specimen: Urine, Clean Catch   Result Value Ref Range    Color, UA Light Yellow     Clarity, UA Clear     Specific Gravity, UA 1.011 1.003 - 1.030    pH, UA 5.5 4.5, 5.0, 5.5, 6.0, 6.5, 7.0, 7.5, 8.0    Leukocytes, UA Negative Negative    Nitrite, UA Negative Negative    Protein, UA 70 (1+) (A) Negative mg/dl    Glucose, UA Negative Negative mg/dl    Ketones, UA Negative Negative mg/dl    Urobilinogen, UA <2.0 <2.0 mg/dl mg/dl    Bilirubin, UA Negative Negative    Occult Blood, UA Negative Negative   Urine Microscopic    Collection Time: 01/06/25  1:52 PM   Result Value Ref Range    RBC, UA None Seen None Seen, 1-2 /hpf    WBC, UA 1-2 None Seen, 1-2 /hpf    Epithelial Cells None Seen None Seen, Occasional /hpf    Bacteria, UA None Seen None Seen, Occasional /hpf   HS Troponin I 2hr    Collection Time: 01/06/25  2:31 PM   Result Value Ref Range    hs TnI 2hr 32 \"Refer to ACS Flowchart\"- see link ng/L    " "Delta 2hr hsTnI -6 <20 ng/L   HS Troponin I 4hr    Collection Time: 01/06/25  5:48 PM   Result Value Ref Range    hs TnI 4hr 34 \"Refer to ACS Flowchart\"- see link ng/L    Delta 4hr hsTnI -4 <20 ng/L   Fingerstick Glucose (POCT)    Collection Time: 01/06/25  9:04 PM   Result Value Ref Range    POC Glucose 142 (H) 65 - 140 mg/dl   Basic metabolic panel    Collection Time: 01/07/25  6:26 AM   Result Value Ref Range    Sodium 144 135 - 147 mmol/L    Potassium 3.4 (L) 3.5 - 5.3 mmol/L    Chloride 110 (H) 96 - 108 mmol/L    CO2 27 21 - 32 mmol/L    ANION GAP 7 4 - 13 mmol/L    BUN 25 5 - 25 mg/dL    Creatinine 1.80 (H) 0.60 - 1.30 mg/dL    Glucose 113 65 - 140 mg/dL    Calcium 7.9 (L) 8.4 - 10.2 mg/dL    eGFR 34 ml/min/1.73sq m   Magnesium    Collection Time: 01/07/25  6:26 AM   Result Value Ref Range    Magnesium 1.8 (L) 1.9 - 2.7 mg/dL   CBC (With Platelets)    Collection Time: 01/07/25  6:26 AM   Result Value Ref Range    WBC 4.23 (L) 4.31 - 10.16 Thousand/uL    RBC 2.48 (L) 3.88 - 5.62 Million/uL    Hemoglobin 7.8 (L) 12.0 - 17.0 g/dL    Hematocrit 24.2 (L) 36.5 - 49.3 %    MCV 98 82 - 98 fL    MCH 31.5 26.8 - 34.3 pg    MCHC 32.2 31.4 - 37.4 g/dL    RDW 16.2 (H) 11.6 - 15.1 %    Platelets 217 149 - 390 Thousands/uL    MPV 10.8 8.9 - 12.7 fL   Fingerstick Glucose (POCT)    Collection Time: 01/07/25  7:54 AM   Result Value Ref Range    POC Glucose 114 65 - 140 mg/dl   Fingerstick Glucose (POCT)    Collection Time: 01/07/25 11:52 AM   Result Value Ref Range    POC Glucose 144 (H) 65 - 140 mg/dl   Fingerstick Glucose (POCT)    Collection Time: 01/07/25  4:57 PM   Result Value Ref Range    POC Glucose 137 65 - 140 mg/dl   Fingerstick Glucose (POCT)    Collection Time: 01/07/25  8:52 PM   Result Value Ref Range    POC Glucose 162 (H) 65 - 140 mg/dl   Basic metabolic panel    Collection Time: 01/08/25  5:10 AM   Result Value Ref Range    Sodium 142 135 - 147 mmol/L    Potassium 3.9 3.5 - 5.3 mmol/L    Chloride 106 96 - 108 " mmol/L    CO2 27 21 - 32 mmol/L    ANION GAP 9 4 - 13 mmol/L    BUN 28 (H) 5 - 25 mg/dL    Creatinine 1.93 (H) 0.60 - 1.30 mg/dL    Glucose 124 65 - 140 mg/dL    Calcium 8.2 (L) 8.4 - 10.2 mg/dL    eGFR 31 ml/min/1.73sq m   Fingerstick Glucose (POCT)    Collection Time: 01/08/25  8:00 AM   Result Value Ref Range    POC Glucose 118 65 - 140 mg/dl     Imaging: I have personally reviewed pertinent reports.    Tele-n/a      Counseling / Coordination of Care  Total time spent today 25 minutes. Greater than 50% of total time was spent with the patient and / or family counseling and / or coordination of care.

## 2025-01-08 NOTE — PROGRESS NOTES
Progress Note - Hospitalist   Name: Jay Roach Jr. 79 y.o. male I MRN: [id number]  Unit/Bed#: -01 I Date of Admission: 1/6/2025   Date of Service: 1/8/2025 I Hospital Day: 2    Assessment & Plan  Acute on chronic heart failure with preserved ejection fraction (HFpEF) ([facility])  Wt Readings from Last 3 Encounters:   01/08/25 75.3 kg (166 lb)   01/03/25 83 kg (182 lb 14.3 oz)   12/24/24 80 kg (176 lb 5.9 oz)     Presents with worsening shortness of breath since discharge on 1/3/25 with lower extremity edema and abdominal distension  Hospitalized from 12/24/2024 to 1/3/2025 with mesenteric ischemia.  Underwent exploratory laparotomy, lysis of adhesions, mesenteric angiogram and retrograde open mesenteric stent placement on 12/24/24. During hospitalization had SHOAIB on CKD-4. Was discharge on Lasix 40 mg 3 times weekly on TTS  Echo (6/8/23) - EF 65%, grade 1 diastolic dysfunction  F/U repeat echo     CXR - small pleural effusions  Continue IV Lasix 40 mg BID.  Cardiology and nephrology following   Monitor I/O, daily weights  Repeat echo pending   Potassium and magnesium supplementation   CKD (chronic kidney disease) stage 4, GFR 15-29 ml/min ([facility])  Lab Results   Component Value Date    EGFR 31 01/08/2025    EGFR 34 01/07/2025    EGFR 34 01/06/2025    CREATININE 1.93 (H) 01/08/2025    CREATININE 1.80 (H) 01/07/2025    CREATININE 1.81 (H) 01/06/2025   Baseline creatinine 1.8 to 2.2  Had SHOAIB during recent admission  Diuretics as above  Currently renal function is stable   Nephrology input appreciated  NSVT (nonsustained ventricular tachycardia) ([facility])  Noted run -- replace K and Mg  Monitor on tele   Coronary artery disease involving native coronary artery of native heart without angina pectoris  S/p CABG  PCI to RCA in 2022 (pre-TAVR)   Ct Plavix, statin, BB  Primary hypertension  Ct home meds Hydralazine 50 mg BID-->increased to 75 mg BID, Metoprolol tartrate 12.5 mg BID, Amlodipine 10 mg daily  Diuresis as  above  Monitor BP  Mesenteric artery stenosis/recent mesenteric ischemia  Recent admission as above - s/p retrograde open mesenteric stent placement on 12/24/24  Ct Plavix, statin  Type 2 diabetes mellitus with stage 4 chronic kidney disease, without long-term current use of insulin ([facility])  Lab Results   Component Value Date    HGBA1C 6.6 (H) 12/24/2024       Recent Labs     01/07/25  1657 01/07/25  2052 01/08/25  0800 01/08/25  1136   POCGLU 137 162* 118 146*       Blood Sugar Average: Last 72 hrs:  (P) [id number]  Home regimen: Glimepiride 1 mg daily, Empagliflozin 10 mg daily - hold while inpatient  Sliding scale insulin  Monitor blood sugars and adjust insulin accordingly  GERD (gastroesophageal reflux disease)  Ct PPI  S/P TAVR (transcatheter aortic valve replacement)  In 2022  F/u echo  Primary non-small cell carcinoma of lower lobe of left lung ([facility])  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    Mobility:   Basic Mobility Inpatient Raw Score: 18  JH-HLM Goal: 6: Walk 10 steps or more  JH-HLM Achieved: 6: Walk 10 steps or more  JH-HLM Goal achieved. Continue to encourage appropriate mobility.    Patient Centered Rounds: I performed bedside rounds with nursing staff today.   Discussions with Specialists or Other Care Team Provider: cardiology     Education and Discussions with Family / Patient: Patient declined call to .     Current Length of Stay: 2 day(s)  Current Patient Status: Inpatient   Certification Statement: The patient will continue to require additional inpatient hospital stay due to IV  diuresis/CHF exacerbation   Discharge Plan: Anticipate discharge in 48 hrs to home.    Code Status: Level 1 - Full Code    Subjective   No acute complaints, he has been voiding often.     Objective :  Temp:  [98 °F (36.7 °C)-98.2 °F (36.8 °C)] 98.1 °F (36.7 °C)  HR:  [55-65] 62  BP: (149-169)/(48-54) 149/48  Resp:  [16] 16  SpO2:  [92 %-94 %] 94 %  O2 Device: None (Room air)    Body mass index is 23.82 kg/m².     Input and Output Summary (last 24 hours):     Intake/Output Summary (Last 24 hours) at 1/8/2025 1530  Last data filed at 1/8/2025 1229  Gross per 24 hour   Intake 580 ml   Output 1400 ml   Net -820 ml       Physical Exam  Vitals reviewed.   Constitutional:       General: He is not in acute distress.     Appearance: He is not toxic-appearing.   HENT:      Head: Normocephalic and atraumatic.   Eyes:      Extraocular Movements: Extraocular movements intact.   Cardiovascular:      Rate and Rhythm: Normal rate and regular rhythm.   Pulmonary:      Effort: Pulmonary effort is normal.   Abdominal:      General: Bowel sounds are normal. There is no distension.      Palpations: Abdomen is soft.      Tenderness: There is no abdominal tenderness.   Musculoskeletal:         General: Normal range of motion.   Neurological:      General: No focal deficit present.      Mental Status: He is alert and oriented to person, place, and time.   Psychiatric:         Mood and Affect: Mood normal.         Behavior: Behavior normal.         Thought Content: Thought content normal.         Lines/Drains:        Telemetry:  Telemetry Orders (From admission, onward)               24 Hour Telemetry Monitoring  Continuous x 24 Hours (Telem)        Expiring   Question:  Reason for 24 Hour Telemetry  Answer:  Decompensated CHF- and any one of the following: continuous diuretic infusion or total diuretic dose >200 mg daily, associated electrolyte derangement (I.e. K < 3.0), inotropic drip (continuous infusion), hx of ventricular arrhythmia,  or new EF < 35%                                  Lab Results: I have reviewed the following results:   Results from last 7 days   Lab Units 01/07/25  0626 01/06/25  1205   WBC Thousand/uL 4.23* 4.60   HEMOGLOBIN g/dL 7.8* 7.5*   HEMATOCRIT % 24.2* 23.5*   PLATELETS Thousands/uL 217 220   SEGS PCT %  --  76*   LYMPHO PCT %  --  13*   MONO PCT %  --  8   EOS PCT %  --  2     Results from last 7 days   Lab Units 01/08/25  0510 01/07/25  0626 01/06/25  1205   SODIUM mmol/L 142   < > 142   POTASSIUM mmol/L 3.9   < > 3.6   CHLORIDE mmol/L 106   < > 112*   CO2 mmol/L 27   < > 25   BUN mg/dL 28*   < > 26*   CREATININE mg/dL 1.93*   < > 1.81*   ANION GAP mmol/L 9   < > 5   CALCIUM mg/dL 8.2*   < > 7.9*   ALBUMIN g/dL  --   --  2.9*   TOTAL BILIRUBIN mg/dL  --   --  0.48   ALK PHOS U/L  --   --  68   ALT U/L  --   --  20   AST U/L  --   --  19   GLUCOSE RANDOM mg/dL 124   < > 126    < > = values in this interval not displayed.     Results from last 7 days   Lab Units 01/06/25  1205   INR  1.01     Results from last 7 days   Lab Units 01/08/25  1136 01/08/25  0800 01/07/25  2052 01/07/25  1657 01/07/25  1152 01/07/25  0754 01/06/25  2104 01/03/25  0626 01/02/25  2106 01/02/25  1601 01/02/25  1039 01/02/25  0806   POC GLUCOSE mg/dl 146* 118 162* 137 144* 114 142* 131 205* 127 179* 102         Results from last 7 days   Lab Units 01/06/25  1205   LACTIC ACID mmol/L 0.8   PROCALCITONIN ng/ml 0.22       Recent Cultures (last 7 days):         Imaging Results Review: No pertinent imaging studies reviewed.  Other Study Results Review: No additional pertinent studies reviewed.    Last 24 Hours Medication List:     Current Facility-Administered Medications:     acetaminophen (TYLENOL) tablet 650 mg, Q4H PRN    allopurinol (ZYLOPRIM) tablet 300 mg, Daily    amLODIPine (NORVASC) tablet 10 mg, Daily    atorvastatin (LIPITOR) tablet 80 mg, HS    calcitriol (ROCALTROL) capsule 0.25 mcg, Daily    clopidogrel (PLAVIX) tablet 75 mg, Daily     ferrous sulfate tablet 325 mg, Every Other Day    furosemide (LASIX) injection 40 mg, BID (diuretic)    heparin (porcine) subcutaneous injection 5,000 Units, Q8H TRENT    hydrALAZINE (APRESOLINE) tablet 75 mg, BID    insulin lispro (HumALOG/ADMELOG) 100 units/mL subcutaneous injection 1-5 Units, TID AC **AND** Fingerstick Glucose (POCT), TID AC    magnesium Oxide (MAG-OX) tablet 400 mg, Daily    metoprolol tartrate (LOPRESSOR) partial tablet 12.5 mg, Q12H TRENT    pancrelipase (Lip-Prot-Amyl) (CREON) delayed release capsule 24,000 Units, TID With Meals    pantoprazole (PROTONIX) EC tablet 40 mg, BID    potassium chloride (Klor-Con M20) CR tablet 20 mEq, BID    Administrative Statements   Today, Patient Was Seen By: Raquel Donovan PA-C      **Please Note: This note may have been constructed using a voice recognition system.**

## 2025-01-08 NOTE — PLAN OF CARE
Problem: SAFETY ADULT  Goal: Patient will remain free of falls  Description: INTERVENTIONS:  - Educate patient/family on patient safety including physical limitations  - Instruct patient to call for assistance with activity   - Consult OT/PT to assist with strengthening/mobility   - Keep Call bell within reach  - Keep bed low and locked with side rails adjusted as appropriate  - Keep care items and personal belongings within reach  - Initiate and maintain comfort rounds  - Make Fall Risk Sign visible to staff  - Offer Toileting every 2 Hours, in advance of need  - Initiate/Maintain bed alarm  - Obtain necessary fall risk management equipment: slippers  - Apply yellow socks and bracelet for high fall risk patients  - Consider moving patient to room near nurses station  Outcome: Progressing     Problem: DISCHARGE PLANNING  Goal: Discharge to home or other facility with appropriate resources  Description: INTERVENTIONS:  - Identify barriers to discharge w/patient and caregiver  - Arrange for needed discharge resources and transportation as appropriate  - Identify discharge learning needs (meds, wound care, etc.)  - Arrange for interpretive services to assist at discharge as needed  - Refer to Case Management Department for coordinating discharge planning if the patient needs post-hospital services based on physician/advanced practitioner order or complex needs related to functional status, cognitive ability, or social support system  Outcome: Progressing     Problem: Knowledge Deficit  Goal: Patient/family/caregiver demonstrates understanding of disease process, treatment plan, medications, and discharge instructions  Description: Complete learning assessment and assess knowledge base.  Interventions:  - Provide teaching at level of understanding  - Provide teaching via preferred learning methods  Outcome: Progressing

## 2025-01-08 NOTE — ASSESSMENT & PLAN NOTE
Lab Results   Component Value Date    HGBA1C 6.6 (H) 12/24/2024       Recent Labs     01/07/25  1657 01/07/25 2052 01/08/25  0800 01/08/25  1136   POCGLU 137 162* 118 146*       Blood Sugar Average: Last 72 hrs:  (P) [id number]

## 2025-01-08 NOTE — ASSESSMENT & PLAN NOTE
Clinically appears to be volume overloaded.    Edema improving but he still has significant edema  Agree with cardiology, would continue intravenous Lasix for more adequate diuresis

## 2025-01-08 NOTE — ASSESSMENT & PLAN NOTE
Is persistently hypertensive  Currently on amlodipine 10 mg daily, hydralazine 50 mg twice daily, Lopressor 12.5 mg twice daily.  Additionally on IV Lasix.  Increase hydralazine 75mg BID.

## 2025-01-08 NOTE — PROGRESS NOTES
Progress Note - Nephrology   Jay Roach Jr. 79 y.o. male MRN: [id number]  Unit/Bed#: -01 Encounter: [id number]      Assessment & Plan  CKD (chronic kidney disease) stage 4, GFR 15-29 ml/min ([facility])  Chronic kidney disease with previous baseline creatinine reported at 1.8-2.2.  Ischemic cardiomyopathy  Recent echocardiogram showing ejection fraction of 65% with abnormal diastolic function.  Known history of TAVR.  No significant regurgitation.  Volume overload  Clinically appears to be volume overloaded.    Edema improving but he still has significant edema  Agree with cardiology, would continue intravenous Lasix for more adequate diuresis  Hypertensive kidney disease with stage 4 chronic kidney disease ([facility])  Blood pressure currently appears to be stable but somewhat elevated.  Overall, blood pressure improving with diuresis.  Continue to monitor.  Hydralazine increased by cardiology today  No changes from his outpatient regimen.  Mesenteric artery stenosis/recent mesenteric ischemia  Recent admission for abdominal pain with exploratory laparotomy with retrograde open SMA stent placement.  Management as per surgery.  Primary hypertension    Coronary artery disease involving native coronary artery of native heart without angina pectoris    Type 2 diabetes mellitus with stage 4 chronic kidney disease, without long-term current use of insulin ([facility])  Lab Results   Component Value Date    HGBA1C 6.6 (H) 12/24/2024       Recent Labs     01/07/25  1657 01/07/25  2052 01/08/25  0800 01/08/25  1136   POCGLU 137 162* 118 146*       Blood Sugar Average: Last 72 hrs:  (P) [id number]    Hypomagnesemia  Initiate oral replacement               Subjective:   The patient was seen and examined earlier this morning.  He denied shortness of breath, chest pain or pressure, abdominal pain, vomiting, diarrhea, sweats, chills      Objective:     Vitals: Blood pressure 169/54, pulse 64, temperature 98 °F (36.7 °C),  "temperature source Oral, resp. rate 16, height 5' 10\" (1.778 m), weight 75.3 kg (166 lb), SpO2 92%.,Body mass index is 23.82 kg/m².Temp (24hrs), Av.1 °F (36.7 °C), Min:98 °F (36.7 °C), Max:98.2 °F (36.8 °C)      Temp:  [98 °F (36.7 °C)-98.2 °F (36.8 °C)] 98 °F (36.7 °C)  HR:  [55-65] 64  Resp:  [16] 16  BP: (152-169)/(49-54) 169/54  SpO2:  [92 %-94 %] 92 %    Weight (last 2 days)       Date/Time Weight    25 1047 75.3 (166)    25 0911 75.3 (166.01)    25 0835 75.3 (166.01)    25 1020 78.5 (173)                 I/O last 24 hours:  In: 1010 [P.O.:1010]  Out: 2500 [Urine:2500]        Physical Exam    /54   Pulse 64   Temp 98 °F (36.7 °C) (Oral)   Resp 16   Ht 5' 10\" (1.778 m)   Wt 75.3 kg (166 lb)   SpO2 92%   BMI 23.82 kg/m²   Vital signs were reviewed  Constitutional: The patient was awake, alert, pleasant, cooperative and in no apparent distress  Cardiovascular: No overt jugular venous tension was noted, S1-S2, no pericardial friction rub S3 appreciated, peripheral edema present in the lower third of legs  Pulmonary: Adequate inspiratory effort, lungs were clear                Invasive Devices       Peripheral Intravenous Line  Duration             Peripheral IV 25 Right Antecubital 2 days                    Medications:    Scheduled Meds:  Current Facility-Administered Medications   Medication Dose Route Frequency Provider Last Rate    acetaminophen  650 mg Oral Q4H PRN Karen Vallejo MD      allopurinol  300 mg Oral Daily Karen Vallejo MD      amLODIPine  10 mg Oral Daily Karen Vallejo MD      atorvastatin  80 mg Oral HS Karen Vallejo MD      calcitriol  0.25 mcg Oral Daily Karen Valljeo MD      clopidogrel  75 mg Oral Daily Karen Vallejo MD      ferrous sulfate  325 mg Oral Every Other Day Karen Vallejo MD      furosemide  40 mg Intravenous BID (diuretic) ZHANNA Alvarez      heparin (porcine)  5,000 Units Subcutaneous Q8H TRENT " "Karen Vallejo MD      hydrALAZINE  75 mg Oral BID ZHANNA Alvarez      insulin lispro  1-5 Units Subcutaneous TID AC Karen Vallejo MD      metoprolol tartrate  12.5 mg Oral Q12H TRENT Karen Vallejo MD      pancrelipase (Lip-Prot-Amyl)  24,000 Units Oral TID With Meals Karen Vallejo MD      pantoprazole  40 mg Oral BID Karen Vallejo MD      potassium chloride  20 mEq Oral BID ZHANNA Alvarez         PRN Meds:.  acetaminophen    Continuous Infusions:         LAB RESULTS:      Results from last 7 days   Lab Units 01/08/25  0510 01/07/25  0626 01/06/25  1205 01/03/25  0522 01/02/25  0404   WBC Thousand/uL  --  4.23* 4.60  --  5.49   HEMOGLOBIN g/dL  --  7.8* 7.5*  --  8.6*   HEMATOCRIT %  --  24.2* 23.5*  --  27.0*   PLATELETS Thousands/uL  --  217 220  --  217   SEGS PCT %  --   --  76*  --  72   LYMPHO PCT %  --   --  13*  --  12*   MONO PCT %  --   --  8  --  9   EOS PCT %  --   --  2  --  6   POTASSIUM mmol/L 3.9 3.4* 3.6 3.5 3.5   CHLORIDE mmol/L 106 110* 112* 110* 109*   CO2 mmol/L 27 27 25 25 23   BUN mg/dL 28* 25 26* 43* 45*   CREATININE mg/dL 1.93* 1.80* 1.81* 2.05* 1.99*   CALCIUM mg/dL 8.2* 7.9* 7.9* 7.7* 8.3*   ALK PHOS U/L  --   --  68  --   --    ALT U/L  --   --  20  --   --    AST U/L  --   --  19  --   --    MAGNESIUM mg/dL  --  1.8* 1.6*  --   --    PHOSPHORUS mg/dL  --   --   --  2.5  --        CUTURES:  No results found for: \"BLOODCX\", \"URINECX\"              PLEASE NOTE:  This encounter was completed utilizing the [employer]Fluency Direct Speech Voice Recognition Software. Grammatical errors, random word insertions, pronoun errors and incomplete sentences are occasional consequences of the system due to software limitations, ambient noise and hardware issues.These may be missed by proof reading prior to affixing electronic signature. Any questions or concerns about the content, text or information contained within the body of this dictation should be directly " addressed to the physician for clarification. Please do not hesitate to call me directly if you have any any questions or concerns.

## 2025-01-08 NOTE — ASSESSMENT & PLAN NOTE
Lab Results   Component Value Date    HGBA1C 6.6 (H) 12/24/2024       Recent Labs     01/07/25  1657 01/07/25 2052 01/08/25  0800 01/08/25  1136   POCGLU 137 162* 118 146*       Blood Sugar Average: Last 72 hrs:  (P) [id number]  Home regimen: Glimepiride 1 mg daily, Empagliflozin 10 mg daily - hold while inpatient  Sliding scale insulin  Monitor blood sugars and adjust insulin accordingly

## 2025-01-08 NOTE — ASSESSMENT & PLAN NOTE
Up to 18 beat  NSVT in the setting of low potassium and magnesium  Would resume telemetry while diuresing.  Maintain potassium >4 and mag >2

## 2025-01-08 NOTE — CASE MANAGEMENT
Case Management Discharge Planning Note    Patient name Jay Roach Jr.  Location /-01 MRN [id number]  : 1945 Date 2025       Current Admission Date: 2025  Current Admission Diagnosis:Ischemic cardiomyopathy   Patient Active Problem List    Diagnosis Date Noted Date Diagnosed    NSVT (nonsustained ventricular tachycardia) ([facility]) 2025     Ischemic cardiomyopathy 2025     Volume overload 2025     Mesenteric artery stenosis/recent mesenteric ischemia 2025     Acute on chronic heart failure with preserved ejection fraction (HFpEF) ([facility]) 2025     PAD (peripheral artery disease) ([facility]) 2025     Short-term memory loss 2025     Encephalopathy 2025     Insomnia 2025     Frailty syndrome in geriatric patient 2025     Ambulatory dysfunction 2025     SHOAIB (acute kidney injury) ([facility]) 2024     Portal Venous Gas 2024     Pneumatosis intestinalis 2024     Pancreatic insufficiency 2024     Mesenteric ischemia ([facility]) 2024     Nephrolithiasis 2024     Controlled type 2 diabetes mellitus with stage 4 chronic kidney disease, without long-term current use of insulin ([facility]) 2024     Hypertensive kidney disease with stage 4 chronic kidney disease ([facility]) 2024     Post-procedural respiratory complication 2024     Primary non-small cell carcinoma of lower lobe of left lung ([facility]) 2024     Peptic ulcer 01/10/2024     History of GI bleed 01/10/2024     Melena 2023     Chronic pancreatitis ([facility]) 2023     GI bleed 2023     Acute blood loss anemia 2023     Acute kidney injury superimposed on stage 4 chronic kidney disease ([facility]) 2023     Vitamin D deficiency 2023     Platelets decreased ([facility]) 2023     Anemia due to stage 4 chronic kidney disease  ([facility]) 2023     Hyperuricemia 2023     Benign hypertension with chronic kidney disease, stage III  ([facility]) 06/28/2022     Secondary hyperparathyroidism of renal origin ([facility]) 06/28/2022     S/P TAVR (transcatheter aortic valve replacement) 06/14/2022     Elevated serum creatinine 04/12/2022     Chronic diastolic CHF (congestive heart failure) ([facility]) 03/31/2022     Acute kidney injury superimposed on chronic kidney disease  ([facility]) 03/22/2022     Iron deficiency anemia 03/22/2022     Persistent proteinuria, unspecified 01/10/2022     CKD (chronic kidney disease) stage 4, GFR 15-29 ml/min ([facility]) 01/10/2022     Leukopenia 01/10/2022     Hypomagnesemia 01/10/2022     Renal cyst, left 01/10/2022     GERD (gastroesophageal reflux disease)      Hyperlipidemia      Type 2 diabetes mellitus with stage 4 chronic kidney disease, without long-term current use of insulin ([facility]) 06/01/2021     Sinus bradycardia 10/08/2020     Calcific tendinitis of right shoulder region 06/17/2019     Right shoulder pain 06/17/2019     Cigarette nicotine dependence with nicotine-induced disorder 10/22/2018     Atherosclerosis of native arteries of extremities with intermittent claudication, bilateral legs ([facility]) 08/12/2016     Stenosis of noncoronary bypass graft ([facility]) 08/12/2016     Asymptomatic bilateral carotid artery stenosis 08/12/2016     S/P CABG (coronary artery bypass graft) 08/12/2016     Primary hypertension 08/12/2016     Renal artery stenosis, native, bilateral ([facility]) 08/12/2016     Coronary artery disease involving native coronary artery of native heart without angina pectoris 08/12/2016     Progressive angina ([facility]) 08/12/2016     Tension headache 10/24/2013       LOS (days): 2  Geometric Mean LOS (GMLOS) (days): 3  Days to GMLOS:1     OBJECTIVE:  Risk of Unplanned Readmission Score: 45.79         Current admission status: Inpatient   Preferred Pharmacy:   [id number]  [place] [address]  Phone: [phone] Fax: [phone]    [facility] ([place], [place]) - [address]  [place] [address] [facility] [address]  Phone: [phone] Fax: [phone]    [facility] - [place] - [address]  [address]  [place] [address]  Phone: [phone] Fax: [phone]    Primary Care Provider: Simón Hadley MD    Primary Insurance: MEDICARE  Secondary Insurance: COMMERCIAL MISCELLANEOUS    DISCHARGE DETAILS:        A post acute care recommendation was made by your care team for HHC.  Discussed Freedom of Choice with patient.  Choice is to return/continue services. Referral placed in [facility] to [facility]                        Requested Home Health Care         Is the patient interested in HHC at discharge?: Yes  Home Health Discipline requested:: Nursing, Physical Therapy  Home Health Agency Name:: [facility]  Home Health Follow-Up Provider:: PCP  Home Health Services Needed:: Evaluate Functional Status and Safety, Gait/ADL Training, Strengthening/Theraputic Exercises to Improve Function, Heart Failure Management  Homebound Criteria Met:: Requires the Assistance of Another Person for Safe Ambulation or to Leave the Home  Supporting Clincal Findings:: Limited Endurance                   Treatment Team Recommendation: Home with Home Health Care  Discharge Destination Plan:: Home with Home Health Care

## 2025-01-08 NOTE — ASSESSMENT & PLAN NOTE
Lab Results   Component Value Date    EGFR 31 01/08/2025    EGFR 34 01/07/2025    EGFR 34 01/06/2025    CREATININE 1.93 (H) 01/08/2025    CREATININE 1.80 (H) 01/07/2025    CREATININE 1.81 (H) 01/06/2025   Baseline creatinine 1.8 to 2.2  Had SHOAIB during recent admission  Diuretics as above  Currently renal function is stable   Nephrology input appreciated

## 2025-01-08 NOTE — ASSESSMENT & PLAN NOTE
Blood pressure currently appears to be stable but somewhat elevated.  Overall, blood pressure improving with diuresis.  Continue to monitor.  Hydralazine increased by cardiology today  No changes from his outpatient regimen.

## 2025-01-08 NOTE — ASSESSMENT & PLAN NOTE
Wt Readings from Last 3 Encounters:   01/08/25 75.3 kg (166 lb)   01/03/25 83 kg (182 lb 14.3 oz)   12/24/24 80 kg (176 lb 5.9 oz)     Presents with worsening shortness of breath since discharge on 1/3/25 with lower extremity edema and abdominal distension  Hospitalized from 12/24/2024 to 1/3/2025 with mesenteric ischemia.  Underwent exploratory laparotomy, lysis of adhesions, mesenteric angiogram and retrograde open mesenteric stent placement on 12/24/24. During hospitalization had SHOAIB on CKD-4. Was discharge on Lasix 40 mg 3 times weekly on TTS  Echo (6/8/23) - EF 65%, grade 1 diastolic dysfunction  F/U repeat echo     CXR - small pleural effusions  Continue IV Lasix 40 mg BID.  Cardiology and nephrology following   Monitor I/O, daily weights  Repeat echo pending   Potassium and magnesium supplementation

## 2025-01-08 NOTE — ASSESSMENT & PLAN NOTE
Patient was discharged from the hospital 1/3.  He reports the start of shortness of breath shortly thereafter.  He said he was experiencing swelling legs when he was discharged.   Previous diuretic dose torsemide 10 mg daily.  He was discharged on Lasix 40 mg 3 times a week. Jardiance on hold   Chest x-ray small bilateral pleural effusions  .  No recent BNP for comparison.  Presents with negative troponins negative.   TTE 6/2023 LVEF 65%.  Grade 1 diastolic dysfunction.  No significant valvular heart disease.  RV size and function normal.    On IV Lasix 40 mg twice daily.  Net -1230  24 hr.  Weight 166 lbs. Stated pre hospital 173.bs.   Will continue IV diuretic- edema/rales/JVD  Will f/u with current echo

## 2025-01-08 NOTE — ASSESSMENT & PLAN NOTE
Ct home meds Hydralazine 50 mg BID-->increased to 75 mg BID, Metoprolol tartrate 12.5 mg BID, Amlodipine 10 mg daily  Diuresis as above  Monitor BP

## 2025-01-08 NOTE — ASSESSMENT & PLAN NOTE
Lab Results   Component Value Date    EGFR 31 01/08/2025    EGFR 34 01/07/2025    EGFR 34 01/06/2025    CREATININE 1.93 (H) 01/08/2025    CREATININE 1.80 (H) 01/07/2025    CREATININE 1.81 (H) 01/06/2025   Baseline creatinine 1.8-2.2  Nephrology following as well

## 2025-01-09 LAB
ANION GAP SERPL CALCULATED.3IONS-SCNC: 9 MMOL/L (ref 4–13)
BUN SERPL-MCNC: 28 MG/DL (ref 5–25)
CALCIUM SERPL-MCNC: 7.9 MG/DL (ref 8.4–10.2)
CHLORIDE SERPL-SCNC: 106 MMOL/L (ref 96–108)
CO2 SERPL-SCNC: 28 MMOL/L (ref 21–32)
CREAT SERPL-MCNC: 2.19 MG/DL (ref 0.6–1.3)
GFR SERPL CREATININE-BSD FRML MDRD: 27 ML/MIN/1.73SQ M
GLUCOSE SERPL-MCNC: 116 MG/DL (ref 65–140)
GLUCOSE SERPL-MCNC: 119 MG/DL (ref 65–140)
GLUCOSE SERPL-MCNC: 119 MG/DL (ref 65–140)
GLUCOSE SERPL-MCNC: 126 MG/DL (ref 65–140)
GLUCOSE SERPL-MCNC: 149 MG/DL (ref 65–140)
MAGNESIUM SERPL-MCNC: 1.6 MG/DL (ref 1.9–2.7)
POTASSIUM SERPL-SCNC: 4 MMOL/L (ref 3.5–5.3)
SODIUM SERPL-SCNC: 143 MMOL/L (ref 135–147)

## 2025-01-09 PROCEDURE — 99232 SBSQ HOSP IP/OBS MODERATE 35: CPT | Performed by: INTERNAL MEDICINE

## 2025-01-09 PROCEDURE — 99232 SBSQ HOSP IP/OBS MODERATE 35: CPT | Performed by: PHYSICIAN ASSISTANT

## 2025-01-09 PROCEDURE — 80048 BASIC METABOLIC PNL TOTAL CA: CPT | Performed by: INTERNAL MEDICINE

## 2025-01-09 PROCEDURE — 83735 ASSAY OF MAGNESIUM: CPT | Performed by: PHYSICIAN ASSISTANT

## 2025-01-09 PROCEDURE — 82948 REAGENT STRIP/BLOOD GLUCOSE: CPT

## 2025-01-09 RX ORDER — TORSEMIDE 20 MG/1
20 TABLET ORAL ONCE
Status: COMPLETED | OUTPATIENT
Start: 2025-01-09 | End: 2025-01-09

## 2025-01-09 RX ORDER — MAGNESIUM SULFATE HEPTAHYDRATE 40 MG/ML
2 INJECTION, SOLUTION INTRAVENOUS ONCE
Status: COMPLETED | OUTPATIENT
Start: 2025-01-09 | End: 2025-01-09

## 2025-01-09 RX ORDER — LANOLIN ALCOHOL/MO/W.PET/CERES
400 CREAM (GRAM) TOPICAL 2 TIMES DAILY
Status: DISCONTINUED | OUTPATIENT
Start: 2025-01-09 | End: 2025-01-11 | Stop reason: HOSPADM

## 2025-01-09 RX ADMIN — CALCITRIOL CAPSULES 0.25 MCG 0.25 MCG: 0.25 CAPSULE ORAL at 08:26

## 2025-01-09 RX ADMIN — Medication 12.5 MG: at 08:25

## 2025-01-09 RX ADMIN — ATORVASTATIN CALCIUM 80 MG: 80 TABLET, FILM COATED ORAL at 21:22

## 2025-01-09 RX ADMIN — HEPARIN SODIUM 5000 UNITS: 5000 INJECTION, SOLUTION INTRAVENOUS; SUBCUTANEOUS at 14:56

## 2025-01-09 RX ADMIN — PANCRELIPASE 24000 UNITS: 120000; 24000; 76000 CAPSULE, DELAYED RELEASE PELLETS ORAL at 16:34

## 2025-01-09 RX ADMIN — MAGNESIUM OXIDE TAB 400 MG (241.3 MG ELEMENTAL MG) 400 MG: 400 (241.3 MG) TAB at 08:25

## 2025-01-09 RX ADMIN — Medication 12.5 MG: at 21:19

## 2025-01-09 RX ADMIN — PANCRELIPASE 24000 UNITS: 120000; 24000; 76000 CAPSULE, DELAYED RELEASE PELLETS ORAL at 08:26

## 2025-01-09 RX ADMIN — HEPARIN SODIUM 5000 UNITS: 5000 INJECTION, SOLUTION INTRAVENOUS; SUBCUTANEOUS at 21:19

## 2025-01-09 RX ADMIN — CLOPIDOGREL BISULFATE 75 MG: 75 TABLET ORAL at 08:25

## 2025-01-09 RX ADMIN — TORSEMIDE 20 MG: 20 TABLET ORAL at 14:56

## 2025-01-09 RX ADMIN — POTASSIUM CHLORIDE 20 MEQ: 1500 TABLET, EXTENDED RELEASE ORAL at 08:25

## 2025-01-09 RX ADMIN — PANTOPRAZOLE SODIUM 40 MG: 40 TABLET, DELAYED RELEASE ORAL at 08:25

## 2025-01-09 RX ADMIN — HYDRALAZINE HYDROCHLORIDE 75 MG: 25 TABLET ORAL at 21:19

## 2025-01-09 RX ADMIN — FUROSEMIDE 40 MG: 10 INJECTION, SOLUTION INTRAMUSCULAR; INTRAVENOUS at 08:25

## 2025-01-09 RX ADMIN — ALLOPURINOL 300 MG: 300 TABLET ORAL at 08:26

## 2025-01-09 RX ADMIN — AMLODIPINE BESYLATE 10 MG: 10 TABLET ORAL at 08:25

## 2025-01-09 RX ADMIN — HEPARIN SODIUM 5000 UNITS: 5000 INJECTION, SOLUTION INTRAVENOUS; SUBCUTANEOUS at 05:23

## 2025-01-09 RX ADMIN — HYDRALAZINE HYDROCHLORIDE 75 MG: 25 TABLET ORAL at 08:25

## 2025-01-09 RX ADMIN — MAGNESIUM OXIDE TAB 400 MG (241.3 MG ELEMENTAL MG) 400 MG: 400 (241.3 MG) TAB at 16:34

## 2025-01-09 RX ADMIN — POTASSIUM CHLORIDE 20 MEQ: 1500 TABLET, EXTENDED RELEASE ORAL at 16:34

## 2025-01-09 RX ADMIN — PANTOPRAZOLE SODIUM 40 MG: 40 TABLET, DELAYED RELEASE ORAL at 21:19

## 2025-01-09 RX ADMIN — FERROUS SULFATE TAB 325 MG (65 MG ELEMENTAL FE) 325 MG: 325 (65 FE) TAB at 08:25

## 2025-01-09 RX ADMIN — MAGNESIUM SULFATE HEPTAHYDRATE 2 G: 40 INJECTION, SOLUTION INTRAVENOUS at 11:37

## 2025-01-09 NOTE — PLAN OF CARE
Problem: SAFETY ADULT  Goal: Patient will remain free of falls  Description: INTERVENTIONS:  - Educate patient/family on patient safety including physical limitations  - Instruct patient to call for assistance with activity   - Consult OT/PT to assist with strengthening/mobility   - Keep Call bell within reach  - Keep bed low and locked with side rails adjusted as appropriate  - Keep care items and personal belongings within reach  - Initiate and maintain comfort rounds  - Make Fall Risk Sign visible to staff  - Offer Toileting every 4 Hours, in advance of need  - Initiate/Maintain 4alarm  - Obtain necessary fall risk management equipment: 4  - Apply yellow socks and bracelet for high fall risk patients  - Consider moving patient to room near nurses station  Outcome: Progressing  Goal: Maintain or return to baseline ADL function  Description: INTERVENTIONS:  -  Assess patient's ability to carry out ADLs; assess patient's baseline for ADL function and identify physical deficits which impact ability to perform ADLs (bathing, care of mouth/teeth, toileting, grooming, dressing, etc.)  - Assess/evaluate cause of self-care deficits   - Assess range of motion  - Assess patient's mobility; develop plan if impaired  - Assess patient's need for assistive devices and provide as appropriate  - Encourage maximum independence but intervene and supervise when necessary  - Involve family in performance of ADLs  - Assess for home care needs following discharge   - Consider OT consult to assist with ADL evaluation and planning for discharge  - Provide patient education as appropriate  Outcome: Progressing  Goal: Maintains/Returns to pre admission functional level  Description: INTERVENTIONS:  - Perform AM-PAC 6 Click Basic Mobility/ Daily Activity assessment daily.  - Set and communicate daily mobility goal to care team and patient/family/caregiver.   - Collaborate with rehabilitation services on mobility goals if consulted  -  Perform Range of Motion 4 times a day.  - Reposition patient every 4 hours.  - Dangle patient 4 times a day  - Stand patient 4 times a day  - Ambulate patient 4 times a day  - Out of bed to chair 4 times a day   - Out of bed for meals 4 times a day  - Out of bed for toileting  - Record patient progress and toleration of activity level   Outcome: Progressing     Problem: DISCHARGE PLANNING  Goal: Discharge to home or other facility with appropriate resources  Description: INTERVENTIONS:  - Identify barriers to discharge w/patient and caregiver  - Arrange for needed discharge resources and transportation as appropriate  - Identify discharge learning needs (meds, wound care, etc.)  - Arrange for interpretive services to assist at discharge as needed  - Refer to Case Management Department for coordinating discharge planning if the patient needs post-hospital services based on physician/advanced practitioner order or complex needs related to functional status, cognitive ability, or social support system  Outcome: Progressing     Problem: Knowledge Deficit  Goal: Patient/family/caregiver demonstrates understanding of disease process, treatment plan, medications, and discharge instructions  Description: Complete learning assessment and assess knowledge base.  Interventions:  - Provide teaching at level of understanding  - Provide teaching via preferred learning methods  Outcome: Progressing     Problem: RESPIRATORY - ADULT  Goal: Achieves optimal ventilation and oxygenation  Description: INTERVENTIONS:  - Assess for changes in respiratory status  - Assess for changes in mentation and behavior  - Position to facilitate oxygenation and minimize respiratory effort  - Oxygen administered by appropriate delivery if ordered  - Initiate smoking cessation education as indicated  - Encourage broncho-pulmonary hygiene including cough, deep breathe, Incentive Spirometry  - Assess the need for suctioning and aspirate as needed  -  Assess and instruct to report SOB or any respiratory difficulty  - Respiratory Therapy support as indicated  Outcome: Progressing     Problem: Prexisting or High Potential for Compromised Skin Integrity  Goal: Skin integrity is maintained or improved  Description: INTERVENTIONS:  - Identify patients at risk for skin breakdown  - Assess and monitor skin integrity  - Assess and monitor nutrition and hydration status  - Monitor labs   - Assess for incontinence   - Turn and reposition patient  - Assist with mobility/ambulation  - Relieve pressure over bony prominences  - Avoid friction and shearing  - Provide appropriate hygiene as needed including keeping skin clean and dry  - Evaluate need for skin moisturizer/barrier cream  - Collaborate with interdisciplinary team   - Patient/family teaching  - Consider wound care consult   Outcome: Progressing

## 2025-01-09 NOTE — PROGRESS NOTES
Progress Note - Nephrology   Jay Roach Jr. 79 y.o. male MRN: [id number]  Unit/Bed#: -01 Encounter: [id number]      Assessment & Plan  CKD (chronic kidney disease) stage 4, GFR 15-29 ml/min ([facility])  Chronic kidney disease with previous baseline creatinine reported at 1.8-2.2.  Recheck BMP in a.m. and monitor renal function closely  Ischemic cardiomyopathy  Recent echocardiogram showing ejection fraction of 65% with abnormal diastolic function.  Known history of TAVR.  No significant regurgitation.  Volume overload  Volume status has significantly improved with decrease in weight and marked improvement in peripheral edema on exam  The patient received an intravenous dose of Lasix this a.m.  Will transition to torsemide 20 mg x 1 dose this afternoon and gauge response via urine output  Hypertensive kidney disease with stage 4 chronic kidney disease ([facility])  Blood pressure currently appears to be stable but somewhat elevated.  Overall, blood pressure improving with diuresis.  Continue to monitor blood pressure with recent dose change and hydralazine and continue diuresis no changes from his outpatient regimen.  Mesenteric artery stenosis/recent mesenteric ischemia  Recent admission for abdominal pain with exploratory laparotomy with retrograde open SMA stent placement.  Management as per surgery.  Coronary artery disease involving native coronary artery of native heart without angina pectoris    Type 2 diabetes mellitus with stage 4 chronic kidney disease, without long-term current use of insulin ([facility])  Lab Results   Component Value Date    HGBA1C 6.6 (H) 12/24/2024       Recent Labs     01/08/25  1647 01/08/25  2112 01/09/25  0809 01/09/25  1142   POCGLU 131 143* 119 119       Blood Sugar Average: Last 72 hrs:  (P) [id number]    Hypomagnesemia  Continue oral replacement but increase dose               Subjective:   The patient was seen and examined early this morning.  Overall, he feels improved.  " He feels that his peripheral edema has significantly improved.  He denied any shortness of breath, chest pain or pressure, abdominal pain, vomiting, diarrhea, sweats, chills, paroxysmal atrial dyspnea orthopnea      Objective:     Vitals: Blood pressure (!) 151/49, pulse 60, temperature 98 °F (36.7 °C), resp. rate 18, height 5' 10\" (1.778 m), weight 73.9 kg (162 lb 14.7 oz), SpO2 93%.,Body mass index is 23.38 kg/m².Temp (24hrs), Av.1 °F (36.7 °C), Min:97.9 °F (36.6 °C), Max:98.2 °F (36.8 °C)      Temp:  [97.9 °F (36.6 °C)-98.2 °F (36.8 °C)] 98 °F (36.7 °C)  HR:  [54-75] 60  Resp:  [16-19] 18  BP: (146-165)/(43-63) 151/49  SpO2:  [91 %-96 %] 93 %    Weight (last 2 days)       Date/Time Weight    25 0558 73.9 (162.92)    25 1047 75.3 (166)    25 0911 75.3 (166.01)    25 0835 75.3 (166.01)                 I/O last 24 hours:  In: 1240 [P.O.:1240]  Out: 2825 [Urine:2825]        Physical Exam    BP (!) 151/49   Pulse 60   Temp 98 °F (36.7 °C)   Resp 18   Ht 5' 10\" (1.778 m)   Wt 73.9 kg (162 lb 14.7 oz)   SpO2 93%   BMI 23.38 kg/m²   Vital signs were reviewed  Constitutional: The patient was awake, alert, pleasant, cooperative and in no apparent distress  Cardiovascular: No overt jugular venous tension was noted, S1-S2, no pericardial friction rub S3 appreciated, peripheral edema has significantly improved and there was evidence of skin wrinkling consistent with recent diuresis  Pulmonary: Adequate inspiratory effort, lungs were clear                Invasive Devices       Peripheral Intravenous Line  Duration             Peripheral IV 25 Distal;Right;Upper;Ventral (anterior) Arm <1 day                    Medications:    Scheduled Meds:  Current Facility-Administered Medications   Medication Dose Route Frequency Provider Last Rate    acetaminophen  650 mg Oral Q4H PRN Karen Vallejo MD      allopurinol  300 mg Oral Daily Karen Vallejo MD      amLODIPine  10 mg Oral Daily " "Karen Vallejo MD      atorvastatin  80 mg Oral HS Karen Vallejo MD      calcitriol  0.25 mcg Oral Daily Karen Vallejo MD      clopidogrel  75 mg Oral Daily Karen Vallejo MD      ferrous sulfate  325 mg Oral Every Other Day Karen Vallejo MD      heparin (porcine)  5,000 Units Subcutaneous Q8H [facility] Karen Vallejo MD      hydrALAZINE  75 mg Oral BID ZHANNA Alvarez      insulin lispro  1-5 Units Subcutaneous TID AC Karen Vallejo MD      magnesium Oxide  400 mg Oral Daily Harpal Calhoun MD      metoprolol tartrate  12.5 mg Oral Q12H [facility] Karen Vallejo MD      pancrelipase (Lip-Prot-Amyl)  24,000 Units Oral TID With Meals Karen Vallejo MD      pantoprazole  40 mg Oral BID Karen Vallejo MD      potassium chloride  20 mEq Oral BID ZHANNA Alvarez      torsemide  20 mg Oral Once Harpal Calhoun MD         PRN Meds:.  acetaminophen    Continuous Infusions:         LAB RESULTS:      Results from last 7 days   Lab Units 01/09/25  0529 01/08/25  0510 01/07/25  0626 01/06/25  1205 01/03/25  0522   WBC Thousand/uL  --   --  4.23* 4.60  --    HEMOGLOBIN g/dL  --   --  7.8* 7.5*  --    HEMATOCRIT %  --   --  24.2* 23.5*  --    PLATELETS Thousands/uL  --   --  217 220  --    SEGS PCT %  --   --   --  76*  --    LYMPHO PCT %  --   --   --  13*  --    MONO PCT %  --   --   --  8  --    EOS PCT %  --   --   --  2  --    POTASSIUM mmol/L 4.0 3.9 3.4* 3.6 3.5   CHLORIDE mmol/L 106 106 110* 112* 110*   CO2 mmol/L 28 27 27 25 25   BUN mg/dL 28* 28* 25 26* 43*   CREATININE mg/dL 2.19* 1.93* 1.80* 1.81* 2.05*   CALCIUM mg/dL 7.9* 8.2* 7.9* 7.9* 7.7*   ALK PHOS U/L  --   --   --  68  --    ALT U/L  --   --   --  20  --    AST U/L  --   --   --  19  --    MAGNESIUM mg/dL 1.6*  --  1.8* 1.6*  --    PHOSPHORUS mg/dL  --   --   --   --  2.5       CUTURES:  No results found for: \"BLOODCX\", \"URINECX\"              PLEASE NOTE:  This encounter was completed utilizing the M- " Modal/Fluency Direct Speech Voice Recognition Software. Grammatical errors, random word insertions, pronoun errors and incomplete sentences are occasional consequences of the system due to software limitations, ambient noise and hardware issues.These may be missed by proof reading prior to affixing electronic signature. Any questions or concerns about the content, text or information contained within the body of this dictation should be directly addressed to the physician for clarification. Please do not hesitate to call me directly if you have any any questions or concerns.

## 2025-01-09 NOTE — ASSESSMENT & PLAN NOTE
Blood pressures have been consistently elevated.  Increased hydralazine to 75 mg twice daily yesterday  On amlodipine 10 mg daily, Lopressor 12.5 mg twice a day, hydralazine increased as mentioned

## 2025-01-09 NOTE — ASSESSMENT & PLAN NOTE
Volume status has significantly improved with decrease in weight and marked improvement in peripheral edema on exam  The patient received an intravenous dose of Lasix this a.m.  Will transition to torsemide 20 mg x 1 dose this afternoon and gauge response via urine output

## 2025-01-09 NOTE — ASSESSMENT & PLAN NOTE
Lab Results   Component Value Date    EGFR 27 01/09/2025    EGFR 31 01/08/2025    EGFR 34 01/07/2025    CREATININE 2.19 (H) 01/09/2025    CREATININE 1.93 (H) 01/08/2025    CREATININE 1.80 (H) 01/07/2025   Baseline creatinine 1.8-2.2  Creatinine 2.1 from 1.9

## 2025-01-09 NOTE — ASSESSMENT & PLAN NOTE
Blood pressure currently appears to be stable but somewhat elevated.  Overall, blood pressure improving with diuresis.  Continue to monitor blood pressure with recent dose change and hydralazine and continue diuresis no changes from his outpatient regimen.

## 2025-01-09 NOTE — PLAN OF CARE
Problem: SAFETY ADULT  Goal: Patient will remain free of falls  Description: INTERVENTIONS:  - Educate patient/family on patient safety including physical limitations  - Instruct patient to call for assistance with activity   - Consult OT/PT to assist with strengthening/mobility   - Keep Call bell within reach  - Keep bed low and locked with side rails adjusted as appropriate  - Keep care items and personal belongings within reach  - Initiate and maintain comfort rounds  - Make Fall Risk Sign visible to staff  - Offer Toileting every 2 Hours, in advance of need  - Initiate/Maintain bed/chair alarm  - Obtain necessary fall risk management equipment: slippers  - Apply yellow socks and bracelet for high fall risk patients  - Consider moving patient to room near nurses station  Outcome: Progressing     Problem: DISCHARGE PLANNING  Goal: Discharge to home or other facility with appropriate resources  Description: INTERVENTIONS:  - Identify barriers to discharge w/patient and caregiver  - Arrange for needed discharge resources and transportation as appropriate  - Identify discharge learning needs (meds, wound care, etc.)  - Arrange for interpretive services to assist at discharge as needed  - Refer to Case Management Department for coordinating discharge planning if the patient needs post-hospital services based on physician/advanced practitioner order or complex needs related to functional status, cognitive ability, or social support system  Outcome: Progressing     Problem: Knowledge Deficit  Goal: Patient/family/caregiver demonstrates understanding of disease process, treatment plan, medications, and discharge instructions  Description: Complete learning assessment and assess knowledge base.  Interventions:  - Provide teaching at level of understanding  - Provide teaching via preferred learning methods  Outcome: Progressing     Problem: RESPIRATORY - ADULT  Goal: Achieves optimal ventilation and  oxygenation  Description: INTERVENTIONS:  - Assess for changes in respiratory status  - Assess for changes in mentation and behavior  - Position to facilitate oxygenation and minimize respiratory effort  - Oxygen administered by appropriate delivery if ordered  - Initiate smoking cessation education as indicated  - Encourage broncho-pulmonary hygiene including cough, deep breathe, Incentive Spirometry  - Assess the need for suctioning and aspirate as needed  - Assess and instruct to report SOB or any respiratory difficulty  - Respiratory Therapy support as indicated  Outcome: Progressing

## 2025-01-09 NOTE — PROGRESS NOTES
Progress Note - Cardiology   Name: Jay Roach Jr. 79 y.o. male I MRN: [id number]  Unit/Bed#: -01 I Date of Admission: 1/6/2025   Date of Service: 1/9/2025 I Hospital Day: 3     Assessment & Plan  Acute on chronic heart failure with preserved ejection fraction (HFpEF) ([facility])  Patient was discharged from the hospital 1/3.  He reports the start of shortness of breath shortly thereafter.  He said he was experiencing swelling legs when he was discharged.   Previous diuretic dose torsemide 10 mg daily.  He was discharged on Lasix 40 mg 3 times a week directed by nephrology.  Essential did not yet take, admitted within several days of discharge. Jardiance on hold   Chest x-ray small bilateral pleural effusions  .  No recent BNP for comparison.  Presents with negative troponins negative.   TTE 6/2023 LVEF 65%.  Grade 1 diastolic dysfunction.  No significant valvular heart disease.  RV size and function normal.    On IV Lasix 40 mg twice daily.  Net - 1685 24 hr.  Weight 166 lbs. Stated pre hospital 173.bs.   Will transition to loop diuretic . Will communicate with nephrology regarding med/dosing.  Will need to keep 24 hr of oral diuretic to assure adequate diuresis prior to discharge.     Coronary artery disease involving native coronary artery of native heart without angina pectoris  History of CABG.  Coronary angiogram 5/2022 patent LIMA to LAD and patent SVG to OM.  On graft to proximal RCA status post BURAK  On aspirin and Plavix, beta-blocker and statin.  Troponins negative  ECG sinus rhythm first-degree block PVC  Type 2 diabetes mellitus with stage 4 chronic kidney disease, without long-term current use of insulin ([facility])  Lab Results   Component Value Date    HGBA1C 6.6 (H) 12/24/2024         CKD (chronic kidney disease) stage 4, GFR 15-29 ml/min ([facility])  Lab Results   Component Value Date    EGFR 27 01/09/2025    EGFR 31 01/08/2025    EGFR 34 01/07/2025    CREATININE 2.19 (H) 01/09/2025    CREATININE  "1.93 (H) 01/08/2025    CREATININE 1.80 (H) 01/07/2025   Baseline creatinine 1.8-2.2  Creatinine 2.1 from 1.9  S/P TAVR (transcatheter aortic valve replacement)  6/2022 TAVR  TTE 6/2023-Smith KYLER 3 ultra 29 TAVR bioprosthetic valve well-seated and appears function normal.  Trace paravalvular leak.  No evidence of transvalvular regurgitation.  Current TTE essentially unchanged  History of GI bleed  H&H stable.  7.8/24.2  Primary non-small cell carcinoma of lower lobe of left lung ([facility])    Hypertensive kidney disease with stage 4 chronic kidney disease ([facility])  Blood pressures have been consistently elevated.  Increased hydralazine to 75 mg twice daily yesterday  On amlodipine 10 mg daily, Lopressor 12.5 mg twice a day, hydralazine increased as mentioned  Mesenteric artery stenosis/recent mesenteric ischemia  Status post exploratory lap 12/24, ADA, mesenteric agram , retrograde open mesenteric stent  NSVT (nonsustained ventricular tachycardia) ([facility])  Up to 18 beat  NSVT in the setting of low potassium and magnesium  No NSVT in over 24 hours  Mag again low- 1.6 , will replete.           Subjective/Objective   Chief Complaint/subjective  No events overnight  Sob and edema resolved. No cp        Vitals: /55   Pulse (!) 54   Temp 97.9 °F (36.6 °C)   Resp 18   Ht 5' 10\" (1.778 m)   Wt 73.9 kg (162 lb 14.7 oz)   SpO2 96%   BMI 23.38 kg/m²     Vitals:    01/08/25 1047 01/09/25 0558   Weight: 75.3 kg (166 lb) 73.9 kg (162 lb 14.7 oz)     Orthostatic Blood Pressures      Flowsheet Row Most Recent Value   Blood Pressure 146/55 filed at 01/09/2025 1147   Patient Position - Orthostatic VS Sitting filed at 01/08/2025 0755              Intake/Output Summary (Last 24 hours) at 1/9/2025 1348  Last data filed at 1/9/2025 1338  Gross per 24 hour   Intake 660 ml   Output 2425 ml   Net -1765 ml       Invasive Devices       None                     Current Facility-Administered Medications:     acetaminophen (TYLENOL) " "tablet 650 mg, Q4H PRN    allopurinol (ZYLOPRIM) tablet 300 mg, Daily    amLODIPine (NORVASC) tablet 10 mg, Daily    atorvastatin (LIPITOR) tablet 80 mg, HS    calcitriol (ROCALTROL) capsule 0.25 mcg, Daily    clopidogrel (PLAVIX) tablet 75 mg, Daily    ferrous sulfate tablet 325 mg, Every Other Day    heparin (porcine) subcutaneous injection 5,000 Units, Q8H [facility]    hydrALAZINE (APRESOLINE) tablet 75 mg, BID    insulin lispro (HumALOG/ADMELOG) 100 units/mL subcutaneous injection 1-5 Units, TID AC **AND** Fingerstick Glucose (POCT), TID AC    magnesium Oxide (MAG-OX) tablet 400 mg, Daily    metoprolol tartrate (LOPRESSOR) partial tablet 12.5 mg, Q12H TRENT    pancrelipase (Lip-Prot-Amyl) (CREON) delayed release capsule 24,000 Units, TID With Meals    pantoprazole (PROTONIX) EC tablet 40 mg, BID    potassium chloride (Klor-Con M20) CR tablet 20 mEq, BID      Physical Exam: /55   Pulse (!) 54   Temp 97.9 °F (36.6 °C)   Resp 18   Ht 5' 10\" (1.778 m)   Wt 73.9 kg (162 lb 14.7 oz)   SpO2 96%   BMI 23.38 kg/m²     General Appearance:    Alert, cooperative, no distress, appears stated age   Head:    Normocephalic, no scleral icterus   Eyes:    PERRL   Nose:   Nares normal, septum midline, no drainage    Throat:   Lips, mucosa, and tongue normal   Neck:   Supple, symmetrical, trachea midline,              Lungs:     Clear to auscultation bilaterally, respirations unlabored        Heart:    Regular rate and rhythm, S1 and S2 normal, no murmur, rub   or gallop       Extremities:   Extremities normal, atraumatic, no cyanosis or edema       Skin:   Skin warm   Neurologic:   Alert and oriented to person place and time, no focal deficits                 Lab Results:   Recent Results (from the past 72 hours)   UA w Reflex to Microscopic w Reflex to Culture    Collection Time: 01/06/25  1:52 PM    Specimen: Urine, Clean Catch   Result Value Ref Range    Color, UA Light Yellow     Clarity, UA Clear     Specific [place], UA " "1.011 1.003 - 1.030    pH, UA 5.5 4.5, 5.0, 5.5, 6.0, 6.5, 7.0, 7.5, 8.0    Leukocytes, UA Negative Negative    Nitrite, UA Negative Negative    Protein, UA 70 (1+) (A) Negative mg/dl    Glucose, UA Negative Negative mg/dl    Ketones, UA Negative Negative mg/dl    Urobilinogen, UA <2.0 <2.0 mg/dl mg/dl    Bilirubin, UA Negative Negative    Occult Blood, UA Negative Negative   Urine Microscopic    Collection Time: 01/06/25  1:52 PM   Result Value Ref Range    RBC, UA None Seen None Seen, 1-2 /hpf    WBC, UA 1-2 None Seen, 1-2 /hpf    Epithelial Cells None Seen None Seen, Occasional /hpf    Bacteria, UA None Seen None Seen, Occasional /hpf   HS Troponin I 2hr    Collection Time: 01/06/25  2:31 PM   Result Value Ref Range    hs TnI 2hr 32 \"Refer to ACS Flowchart\"- see link ng/L    Delta 2hr hsTnI -6 <20 ng/L   HS Troponin I 4hr    Collection Time: 01/06/25  5:48 PM   Result Value Ref Range    hs TnI 4hr 34 \"Refer to ACS Flowchart\"- see link ng/L    Delta 4hr hsTnI -4 <20 ng/L   Fingerstick Glucose (POCT)    Collection Time: 01/06/25  9:04 PM   Result Value Ref Range    POC Glucose 142 (H) 65 - 140 mg/dl   Basic metabolic panel    Collection Time: 01/07/25  6:26 AM   Result Value Ref Range    Sodium 144 135 - 147 mmol/L    Potassium 3.4 (L) 3.5 - 5.3 mmol/L    Chloride 110 (H) 96 - 108 mmol/L    CO2 27 21 - 32 mmol/L    ANION GAP 7 4 - 13 mmol/L    BUN 25 5 - 25 mg/dL    Creatinine 1.80 (H) 0.60 - 1.30 mg/dL    Glucose 113 65 - 140 mg/dL    Calcium 7.9 (L) 8.4 - 10.2 mg/dL    eGFR 34 ml/min/1.73sq m   Magnesium    Collection Time: 01/07/25  6:26 AM   Result Value Ref Range    Magnesium 1.8 (L) 1.9 - 2.7 mg/dL   CBC (With Platelets)    Collection Time: 01/07/25  6:26 AM   Result Value Ref Range    WBC 4.23 (L) 4.31 - 10.16 Thousand/uL    RBC 2.48 (L) 3.88 - 5.62 Million/uL    Hemoglobin 7.8 (L) 12.0 - 17.0 g/dL    Hematocrit 24.2 (L) 36.5 - 49.3 %    MCV 98 82 - 98 fL    MCH 31.5 26.8 - 34.3 pg    MCHC 32.2 31.4 - 37.4 " g/dL    RDW 16.2 (H) 11.6 - 15.1 %    Platelets 217 149 - 390 Thousands/uL    MPV 10.8 8.9 - 12.7 fL   Fingerstick Glucose (POCT)    Collection Time: 01/07/25  7:54 AM   Result Value Ref Range    POC Glucose 114 65 - 140 mg/dl   Fingerstick Glucose (POCT)    Collection Time: 01/07/25 11:52 AM   Result Value Ref Range    POC Glucose 144 (H) 65 - 140 mg/dl   Fingerstick Glucose (POCT)    Collection Time: 01/07/25  4:57 PM   Result Value Ref Range    POC Glucose 137 65 - 140 mg/dl   Fingerstick Glucose (POCT)    Collection Time: 01/07/25  8:52 PM   Result Value Ref Range    POC Glucose 162 (H) 65 - 140 mg/dl   Basic metabolic panel    Collection Time: 01/08/25  5:10 AM   Result Value Ref Range    Sodium 142 135 - 147 mmol/L    Potassium 3.9 3.5 - 5.3 mmol/L    Chloride 106 96 - 108 mmol/L    CO2 27 21 - 32 mmol/L    ANION GAP 9 4 - 13 mmol/L    BUN 28 (H) 5 - 25 mg/dL    Creatinine 1.93 (H) 0.60 - 1.30 mg/dL    Glucose 124 65 - 140 mg/dL    Calcium 8.2 (L) 8.4 - 10.2 mg/dL    eGFR 31 ml/min/1.73sq m   Fingerstick Glucose (POCT)    Collection Time: 01/08/25  8:00 AM   Result Value Ref Range    POC Glucose 118 65 - 140 mg/dl   Echo complete w/ contrast if indicated    Collection Time: 01/08/25 11:07 AM   Result Value Ref Range    BSA 1.93 m2    A4C EF 78 %    LVOT stroke volume 105.98     LVOT stroke volume index 54.90 ml/m2    LVOT Cardiac Output 4.89 l/min    LVOT Cardiac Index 2.53 l/min/m2    LVIDd 4.90 cm    LVIDS 3.10 cm    IVSd 1.40 cm    LVPWd 1.20 cm    LVOT diameter 2.3 cm    LVOT peak VTI 25.52 cm    FS 37 28 - 44    MV E' Tissue Velocity Septal 6 cm/s    LA Volume Index (BP) 43.5 mL/m2    E/A ratio 0.73     E wave deceleration time 410 ms    MV Peak E Pop 90 cm/s    MV Peak A Pop 1.24 m/s    AV LVOT peak gradient 3 mmHg    LVOT peak opp 0.85 m/s    RVID d 3.4 cm    Tricuspid annular plane systolic excursion 2.20 cm    LA size 4.9 cm    LA length (A2C) 5.30 cm    LA volume (BP) 84 mL    RA 2D Volume 30.0 mL     RAA A4C 14.2 cm2    Aortic valve peak velocity 1.67 m/s    Ao VTI 47.88 cm    AV mean gradient 7 mmHg    LVOT mn grad 2.0 mmHg    AV peak gradient 11 mmHg    AV area by cont VTI 2.2 cm2    AV area peak tawanda 2.1 cm2    MV stenosis pressure 1/2 time 119 ms    MV valve area p 1/2 method 1.85     Ao root 2.70 cm    Asc Ao 3.8 cm    Aortic valve mean velocity 12.20 m/s    Left ventricular stroke volume (2D) 75.00 mL    IVS 1.4 cm    LEFT VENTRICLE SYSTOLIC VOLUME (MOD BIPLANE) 2D 39 mL    LV DIASTOLIC VOLUME (MOD BIPLANE) 2D 114 mL    Left Atrium Area-systolic Four Chamber 24.4 cm2    Left Atrium Area-systolic Apical Two Chamber 23.6 cm2    LVSV, 2D 75 mL    LVOT area 4.15 cm2    DVI 0.51     AV valve area 2.21 cm2    LV EF 65     Est. RA pres 3.0 mmHg   Fingerstick Glucose (POCT)    Collection Time: 01/08/25 11:36 AM   Result Value Ref Range    POC Glucose 146 (H) 65 - 140 mg/dl   Fingerstick Glucose (POCT)    Collection Time: 01/08/25  4:47 PM   Result Value Ref Range    POC Glucose 131 65 - 140 mg/dl   Fingerstick Glucose (POCT)    Collection Time: 01/08/25  9:12 PM   Result Value Ref Range    POC Glucose 143 (H) 65 - 140 mg/dl   Basic metabolic panel    Collection Time: 01/09/25  5:29 AM   Result Value Ref Range    Sodium 143 135 - 147 mmol/L    Potassium 4.0 3.5 - 5.3 mmol/L    Chloride 106 96 - 108 mmol/L    CO2 28 21 - 32 mmol/L    ANION GAP 9 4 - 13 mmol/L    BUN 28 (H) 5 - 25 mg/dL    Creatinine 2.19 (H) 0.60 - 1.30 mg/dL    Glucose 126 65 - 140 mg/dL    Calcium 7.9 (L) 8.4 - 10.2 mg/dL    eGFR 27 ml/min/1.73sq m   Magnesium    Collection Time: 01/09/25  5:29 AM   Result Value Ref Range    Magnesium 1.6 (L) 1.9 - 2.7 mg/dL   Fingerstick Glucose (POCT)    Collection Time: 01/09/25  8:09 AM   Result Value Ref Range    POC Glucose 119 65 - 140 mg/dl   Fingerstick Glucose (POCT)    Collection Time: 01/09/25 11:42 AM   Result Value Ref Range    POC Glucose 119 65 - 140 mg/dl     Imaging: I have personally reviewed  pertinent reports.    Tele- nsr    Counseling / Coordination of Care  Total time spent today 25 minutes. Greater than 50% of total time was spent with the patient and / or family counseling and / or coordination of care.

## 2025-01-09 NOTE — ASSESSMENT & PLAN NOTE
Chronic kidney disease with previous baseline creatinine reported at 1.8-2.2.  Recheck BMP in a.m. and monitor renal function closely

## 2025-01-09 NOTE — ASSESSMENT & PLAN NOTE
Lab Results   Component Value Date    EGFR 27 01/09/2025    EGFR 31 01/08/2025    EGFR 34 01/07/2025    CREATININE 2.19 (H) 01/09/2025    CREATININE 1.93 (H) 01/08/2025    CREATININE 1.80 (H) 01/07/2025   Baseline creatinine 1.8 to 2.2  Had SHOAIB during recent admission  Diuretics as above  Currently renal function is stable   Nephrology input appreciated

## 2025-01-09 NOTE — ASSESSMENT & PLAN NOTE
Up to 18 beat  NSVT in the setting of low potassium and magnesium  No NSVT in over 24 hours  Mag again low- 1.6 , will replete.

## 2025-01-09 NOTE — ASSESSMENT & PLAN NOTE
6/2022 TAVR  TTE 6/2023-Smith KYLER 3 ultra 29 TAVR bioprosthetic valve well-seated and appears function normal.  Trace paravalvular leak.  No evidence of transvalvular regurgitation.  Current TTE essentially unchanged

## 2025-01-09 NOTE — ASSESSMENT & PLAN NOTE
Lab Results   Component Value Date    HGBA1C 6.6 (H) 12/24/2024       Recent Labs     01/08/25  1647 01/08/25  2112 01/09/25  0809 01/09/25  1142   POCGLU 131 143* 119 119       Blood Sugar Average: Last 72 hrs:  (P) [id number]  Home regimen: Glimepiride 1 mg daily, Empagliflozin 10 mg daily - hold while inpatient  Sliding scale insulin  Monitor blood sugars and adjust insulin accordingly

## 2025-01-09 NOTE — PROGRESS NOTES
Progress Note - Hospitalist   Name: Jay Roach Jr. 79 y.o. male I MRN: [id number]  Unit/Bed#: -01 I Date of Admission: 1/6/2025   Date of Service: 1/9/2025 I Hospital Day: 3    Assessment & Plan  Acute on chronic heart failure with preserved ejection fraction (HFpEF) ([facility])  Wt Readings from Last 3 Encounters:   01/09/25 73.9 kg (162 lb 14.7 oz)   01/03/25 83 kg (182 lb 14.3 oz)   12/24/24 80 kg (176 lb 5.9 oz)     Presents with worsening shortness of breath since discharge on 1/3/25 with lower extremity edema and abdominal distension  Hospitalized from 12/24/2024 to 1/3/2025 with mesenteric ischemia.  Underwent exploratory laparotomy, lysis of adhesions, mesenteric angiogram and retrograde open mesenteric stent placement on 12/24/24. During hospitalization had SHOAIB on CKD-4. Was discharge on Lasix 40 mg 3 times weekly on TTS  Echo (6/8/23) - EF 65%, grade 1 diastolic dysfunction  Repeat echo this admission stable     CXR - small pleural effusions  S/p IV diuresis, cardiology and nephrology following.  Transition to PO torsemide 1/9 and monitor.   Monitor I/O, daily weights  Potassium and magnesium supplementation   CKD (chronic kidney disease) stage 4, GFR 15-29 ml/min ([facility])  Lab Results   Component Value Date    EGFR 27 01/09/2025    EGFR 31 01/08/2025    EGFR 34 01/07/2025    CREATININE 2.19 (H) 01/09/2025    CREATININE 1.93 (H) 01/08/2025    CREATININE 1.80 (H) 01/07/2025   Baseline creatinine 1.8 to 2.2  Had SHOAIB during recent admission  Diuretics as above  Currently renal function is stable   Nephrology input appreciated  NSVT (nonsustained ventricular tachycardia) ([facility])  Noted run -- replace K and Mg  Monitor on tele   Coronary artery disease involving native coronary artery of native heart without angina pectoris  S/p CABG  PCI to RCA in 2022 (pre-TAVR)   Ct Plavix, statin, BB  Primary hypertension  Ct home meds Hydralazine 50 mg BID-->increased to 75 mg BID, Metoprolol tartrate 12.5 mg  BID, Amlodipine 10 mg daily  Monitor BP  Mesenteric artery stenosis/recent mesenteric ischemia  Recent admission as above - s/p retrograde open mesenteric stent placement on 12/24/24  Ct Plavix, statin  Type 2 diabetes mellitus with stage 4 chronic kidney disease, without long-term current use of insulin ([facility])  Lab Results   Component Value Date    HGBA1C 6.6 (H) 12/24/2024       Recent Labs     01/08/25  1647 01/08/25  2112 01/09/25  0809 01/09/25  1142   POCGLU 131 143* 119 119       Blood Sugar Average: Last 72 hrs:  (P) 134.8885447782196164  Home regimen: Glimepiride 1 mg daily, Empagliflozin 10 mg daily - hold while inpatient  Sliding scale insulin  Monitor blood sugars and adjust insulin accordingly  GERD (gastroesophageal reflux disease)  Ct PPI  S/P TAVR (transcatheter aortic valve replacement)  In 2022  F/u echo  Primary non-small cell carcinoma of lower lobe of left lung (HCC)  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 diagnosed in April 2024  Completed definitive SBRT on 5/30/2024  Follows with radiation oncology   History of GI bleed  H/o gastric ulcers s/p clipping  Ct PPI  PAD (peripheral artery disease) ([facility])  S/p bilateral lower extremity revascularization  Hypomagnesemia  Replacement ordered     VTE Pharmacologic Prophylaxis: VTE Score: 4 Moderate Risk (Score 3-4) - Pharmacological DVT Prophylaxis Ordered: heparin.    Mobility:   Basic Mobility Inpatient Raw Score: 18  JH-HLM Goal: 6: Walk 10 steps or more  JH-HLM Achieved: 6: Walk 10 steps or more  JH-HLM Goal achieved. Continue to encourage appropriate mobility.    Patient Centered Rounds: I performed bedside rounds with nursing staff today.   Discussions with Specialists or Other Care Team Provider: CM    Education and Discussions with Family / Patient: Patient declined call to .     Current Length of Stay: 3 day(s)  Current Patient Status: Inpatient   Certification Statement: The patient will continue to require additional  inpatient hospital stay due to transition to PO diuretics  Discharge Plan: Anticipate discharge tomorrow to home.    Code Status: Level 1 - Full Code    Subjective   Overall doing better, LE edema significantly improved     Objective :  Temp:  [97.9 °F (36.6 °C)-98.2 °F (36.8 °C)] 98 °F (36.7 °C)  HR:  [54-75] 60  BP: (146-165)/(43-63) 151/49  Resp:  [16-19] 18  SpO2:  [91 %-96 %] 93 %  O2 Device: None (Room air)    Body mass index is 23.38 kg/m².     Input and Output Summary (last 24 hours):     Intake/Output Summary (Last 24 hours) at 1/9/2025 1533  Last data filed at 1/9/2025 1338  Gross per 24 hour   Intake 660 ml   Output 2175 ml   Net -1515 ml       Physical Exam  Vitals reviewed.   Constitutional:       General: He is not in acute distress.     Appearance: He is not toxic-appearing.   HENT:      Head: Normocephalic and atraumatic.   Eyes:      Extraocular Movements: Extraocular movements intact.   Cardiovascular:      Rate and Rhythm: Normal rate and regular rhythm.   Pulmonary:      Effort: Pulmonary effort is normal. No respiratory distress.   Abdominal:      General: Bowel sounds are normal. There is no distension.      Palpations: Abdomen is soft.   Musculoskeletal:         General: Normal range of motion.      Right lower leg: No edema.      Left lower leg: No edema.   Neurological:      General: No focal deficit present.      Mental Status: He is alert and oriented to person, place, and time.   Psychiatric:         Mood and Affect: Mood normal.         Behavior: Behavior normal.         Lines/Drains:        Telemetry:  Telemetry Orders (From admission, onward)               24 Hour Telemetry Monitoring  Continuous x 24 Hours (Telem)        Expiring   Question:  Reason for 24 Hour Telemetry  Answer:  Decompensated CHF- and any one of the following: continuous diuretic infusion or total diuretic dose >200 mg daily, associated electrolyte derangement (I.e. K < 3.0), inotropic drip (continuous infusion), hx  of ventricular arrhythmia, or new EF < 35%                                    Lab Results: I have reviewed the following results:   Results from last 7 days   Lab Units 01/07/25  0626 01/06/25  1205   WBC Thousand/uL 4.23* 4.60   HEMOGLOBIN g/dL 7.8* 7.5*   HEMATOCRIT % 24.2* 23.5*   PLATELETS Thousands/uL 217 220   SEGS PCT %  --  76*   LYMPHO PCT %  --  13*   MONO PCT %  --  8   EOS PCT %  --  2     Results from last 7 days   Lab Units 01/09/25  0529 01/07/25  0626 01/06/25  1205   SODIUM mmol/L 143   < > 142   POTASSIUM mmol/L 4.0   < > 3.6   CHLORIDE mmol/L 106   < > 112*   CO2 mmol/L 28   < > 25   BUN mg/dL 28*   < > 26*   CREATININE mg/dL 2.19*   < > 1.81*   ANION GAP mmol/L 9   < > 5   CALCIUM mg/dL 7.9*   < > 7.9*   ALBUMIN g/dL  --   --  2.9*   TOTAL BILIRUBIN mg/dL  --   --  0.48   ALK PHOS U/L  --   --  68   ALT U/L  --   --  20   AST U/L  --   --  19   GLUCOSE RANDOM mg/dL 126   < > 126    < > = values in this interval not displayed.     Results from last 7 days   Lab Units 01/06/25  1205   INR  1.01     Results from last 7 days   Lab Units 01/09/25  1142 01/09/25  0809 01/08/25  2112 01/08/25  1647 01/08/25  1136 01/08/25  0800 01/07/25  2052 01/07/25  1657 01/07/25  1152 01/07/25  0754 01/06/25  2104 01/03/25  0626   POC GLUCOSE mg/dl 119 119 143* 131 146* 118 162* 137 144* 114 142* 131         Results from last 7 days   Lab Units 01/06/25  1205   LACTIC ACID mmol/L 0.8   PROCALCITONIN ng/ml 0.22       Recent Cultures (last 7 days):         Imaging Results Review: No pertinent imaging studies reviewed.  Other Study Results Review: No additional pertinent studies reviewed.    Last 24 Hours Medication List:     Current Facility-Administered Medications:     acetaminophen (TYLENOL) tablet 650 mg, Q4H PRN    allopurinol (ZYLOPRIM) tablet 300 mg, Daily    amLODIPine (NORVASC) tablet 10 mg, Daily    atorvastatin (LIPITOR) tablet 80 mg, HS    calcitriol (ROCALTROL) capsule 0.25 mcg, Daily    clopidogrel  (PLAVIX) tablet 75 mg, Daily    ferrous sulfate tablet 325 mg, Every Other Day    heparin (porcine) subcutaneous injection 5,000 Units, Q8H TRENT    hydrALAZINE (APRESOLINE) tablet 75 mg, BID    insulin lispro (HumALOG/ADMELOG) 100 units/mL subcutaneous injection 1-5 Units, TID AC **AND** Fingerstick Glucose (POCT), TID AC    magnesium Oxide (MAG-OX) tablet 400 mg, BID    metoprolol tartrate (LOPRESSOR) partial tablet 12.5 mg, Q12H TRENT    pancrelipase (Lip-Prot-Amyl) (CREON) delayed release capsule 24,000 Units, TID With Meals    pantoprazole (PROTONIX) EC tablet 40 mg, BID    potassium chloride (Klor-Con M20) CR tablet 20 mEq, BID    Administrative Statements   Today, Patient Was Seen By: Raquel Donovan PA-C      **Please Note: This note may have been constructed using a voice recognition system.**

## 2025-01-09 NOTE — ASSESSMENT & PLAN NOTE
Patient was discharged from the hospital 1/3.  He reports the start of shortness of breath shortly thereafter.  He said he was experiencing swelling legs when he was discharged.   Previous diuretic dose torsemide 10 mg daily.  He was discharged on Lasix 40 mg 3 times a week directed by nephrology.  Essential did not yet take, admitted within several days of discharge. Jardiance on hold   Chest x-ray small bilateral pleural effusions  .  No recent BNP for comparison.  Presents with negative troponins negative.   TTE 6/2023 LVEF 65%.  Grade 1 diastolic dysfunction.  No significant valvular heart disease.  RV size and function normal.    On IV Lasix 40 mg twice daily.  Net - 1685 24 hr.  Weight 166 lbs. Stated pre hospital 173.bs.   Will transition to loop diuretic . Will communicate with nephrology regarding med/dosing.  Will need to keep 24 hr of oral diuretic to assure adequate diuresis prior to discharge.

## 2025-01-09 NOTE — ASSESSMENT & PLAN NOTE
Lab Results   Component Value Date    HGBA1C 6.6 (H) 12/24/2024       Recent Labs     01/08/25  1647 01/08/25  2112 01/09/25  0809 01/09/25  1142   POCGLU 131 143* 119 119       Blood Sugar Average: Last 72 hrs:  (P) 134.9315020319078492

## 2025-01-09 NOTE — ASSESSMENT & PLAN NOTE
Ct home meds Hydralazine 50 mg BID-->increased to 75 mg BID, Metoprolol tartrate 12.5 mg BID, Amlodipine 10 mg daily  Monitor BP

## 2025-01-09 NOTE — ASSESSMENT & PLAN NOTE
Wt Readings from Last 3 Encounters:   01/09/25 73.9 kg (162 lb 14.7 oz)   01/03/25 83 kg (182 lb 14.3 oz)   12/24/24 80 kg (176 lb 5.9 oz)     Presents with worsening shortness of breath since discharge on 1/3/25 with lower extremity edema and abdominal distension  Hospitalized from 12/24/2024 to 1/3/2025 with mesenteric ischemia.  Underwent exploratory laparotomy, lysis of adhesions, mesenteric angiogram and retrograde open mesenteric stent placement on 12/24/24. During hospitalization had SHOAIB on CKD-4. Was discharge on Lasix 40 mg 3 times weekly on TTS  Echo (6/8/23) - EF 65%, grade 1 diastolic dysfunction  Repeat echo this admission stable     CXR - small pleural effusions  S/p IV diuresis, cardiology and nephrology following.  Transition to PO torsemide 1/9 and monitor.   Monitor I/O, daily weights  Potassium and magnesium supplementation

## 2025-01-10 LAB
ANION GAP SERPL CALCULATED.3IONS-SCNC: 7 MMOL/L (ref 4–13)
BUN SERPL-MCNC: 29 MG/DL (ref 5–25)
CALCIUM SERPL-MCNC: 8.1 MG/DL (ref 8.4–10.2)
CHLORIDE SERPL-SCNC: 104 MMOL/L (ref 96–108)
CO2 SERPL-SCNC: 28 MMOL/L (ref 21–32)
CREAT SERPL-MCNC: 2.24 MG/DL (ref 0.6–1.3)
GFR SERPL CREATININE-BSD FRML MDRD: 26 ML/MIN/1.73SQ M
GLUCOSE SERPL-MCNC: 116 MG/DL (ref 65–140)
GLUCOSE SERPL-MCNC: 119 MG/DL (ref 65–140)
GLUCOSE SERPL-MCNC: 133 MG/DL (ref 65–140)
GLUCOSE SERPL-MCNC: 140 MG/DL (ref 65–140)
GLUCOSE SERPL-MCNC: 179 MG/DL (ref 65–140)
MAGNESIUM SERPL-MCNC: 2 MG/DL (ref 1.9–2.7)
POTASSIUM SERPL-SCNC: 4.3 MMOL/L (ref 3.5–5.3)
SODIUM SERPL-SCNC: 139 MMOL/L (ref 135–147)

## 2025-01-10 PROCEDURE — 99232 SBSQ HOSP IP/OBS MODERATE 35: CPT | Performed by: INTERNAL MEDICINE

## 2025-01-10 PROCEDURE — 80048 BASIC METABOLIC PNL TOTAL CA: CPT | Performed by: PHYSICIAN ASSISTANT

## 2025-01-10 PROCEDURE — 83735 ASSAY OF MAGNESIUM: CPT | Performed by: PHYSICIAN ASSISTANT

## 2025-01-10 PROCEDURE — 82948 REAGENT STRIP/BLOOD GLUCOSE: CPT

## 2025-01-10 PROCEDURE — 99232 SBSQ HOSP IP/OBS MODERATE 35: CPT | Performed by: NURSE PRACTITIONER

## 2025-01-10 RX ORDER — TORSEMIDE 20 MG/1
40 TABLET ORAL DAILY
Status: DISCONTINUED | OUTPATIENT
Start: 2025-01-10 | End: 2025-01-11

## 2025-01-10 RX ADMIN — HYDRALAZINE HYDROCHLORIDE 75 MG: 25 TABLET ORAL at 09:33

## 2025-01-10 RX ADMIN — MAGNESIUM OXIDE TAB 400 MG (241.3 MG ELEMENTAL MG) 400 MG: 400 (241.3 MG) TAB at 09:33

## 2025-01-10 RX ADMIN — Medication 12.5 MG: at 21:21

## 2025-01-10 RX ADMIN — HEPARIN SODIUM 5000 UNITS: 5000 INJECTION, SOLUTION INTRAVENOUS; SUBCUTANEOUS at 05:58

## 2025-01-10 RX ADMIN — TORSEMIDE 40 MG: 20 TABLET ORAL at 15:58

## 2025-01-10 RX ADMIN — Medication 12.5 MG: at 09:33

## 2025-01-10 RX ADMIN — AMLODIPINE BESYLATE 10 MG: 10 TABLET ORAL at 09:33

## 2025-01-10 RX ADMIN — POTASSIUM CHLORIDE 20 MEQ: 1500 TABLET, EXTENDED RELEASE ORAL at 16:45

## 2025-01-10 RX ADMIN — ATORVASTATIN CALCIUM 80 MG: 80 TABLET, FILM COATED ORAL at 21:21

## 2025-01-10 RX ADMIN — PANTOPRAZOLE SODIUM 40 MG: 40 TABLET, DELAYED RELEASE ORAL at 21:21

## 2025-01-10 RX ADMIN — HYDRALAZINE HYDROCHLORIDE 75 MG: 25 TABLET ORAL at 21:21

## 2025-01-10 RX ADMIN — HEPARIN SODIUM 5000 UNITS: 5000 INJECTION, SOLUTION INTRAVENOUS; SUBCUTANEOUS at 14:18

## 2025-01-10 RX ADMIN — CLOPIDOGREL BISULFATE 75 MG: 75 TABLET ORAL at 09:33

## 2025-01-10 RX ADMIN — PANTOPRAZOLE SODIUM 40 MG: 40 TABLET, DELAYED RELEASE ORAL at 09:33

## 2025-01-10 RX ADMIN — PANCRELIPASE 24000 UNITS: 120000; 24000; 76000 CAPSULE, DELAYED RELEASE PELLETS ORAL at 11:56

## 2025-01-10 RX ADMIN — ALLOPURINOL 300 MG: 300 TABLET ORAL at 09:34

## 2025-01-10 RX ADMIN — PANCRELIPASE 24000 UNITS: 120000; 24000; 76000 CAPSULE, DELAYED RELEASE PELLETS ORAL at 07:52

## 2025-01-10 RX ADMIN — CALCITRIOL CAPSULES 0.25 MCG 0.25 MCG: 0.25 CAPSULE ORAL at 09:34

## 2025-01-10 RX ADMIN — HEPARIN SODIUM 5000 UNITS: 5000 INJECTION, SOLUTION INTRAVENOUS; SUBCUTANEOUS at 21:21

## 2025-01-10 RX ADMIN — POTASSIUM CHLORIDE 20 MEQ: 1500 TABLET, EXTENDED RELEASE ORAL at 09:33

## 2025-01-10 RX ADMIN — MAGNESIUM OXIDE TAB 400 MG (241.3 MG ELEMENTAL MG) 400 MG: 400 (241.3 MG) TAB at 16:45

## 2025-01-10 RX ADMIN — PANCRELIPASE 24000 UNITS: 120000; 24000; 76000 CAPSULE, DELAYED RELEASE PELLETS ORAL at 16:45

## 2025-01-10 NOTE — ASSESSMENT & PLAN NOTE
Wt Readings from Last 3 Encounters:   01/10/25 75 kg (165 lb 5.5 oz)   01/03/25 83 kg (182 lb 14.3 oz)   12/24/24 80 kg (176 lb 5.9 oz)     Presents with worsening shortness of breath since discharge on 1/3/25 with lower extremity edema and abdominal distension  Hospitalized from 12/24/2024 to 1/3/2025 with mesenteric ischemia.  Underwent exploratory laparotomy, lysis of adhesions, mesenteric angiogram and retrograde open mesenteric stent placement on 12/24/24. During hospitalization had SHOAIB on CKD-4. Was discharge on Lasix 40 mg 3 times weekly on TTS  Echo (6/8/23) - EF 65%, grade 1 diastolic dysfunction  Repeat echo this admission stable     CXR - small pleural effusions  S/p IV diuresis, cardiology and nephrology following.  Transition to PO torsemide 1/9 with slight increase in creatinine  Pt with very frequent low volume voids asked nursing to please bladder scan (83ml now )   Pt with less than 10 cc feeling the urge (PVR 83ml)  Will obtain cbc and bmp in am   Monitor I/O, daily weights  Potassium and magnesium supplementation

## 2025-01-10 NOTE — CASE MANAGEMENT
Case Management Discharge Planning Note    Patient name Jay Roach Jr.  Location /-01 MRN [id number]  : 1945 Date 1/10/2025       Current Admission Date: 2025  Current Admission Diagnosis:Ischemic cardiomyopathy   Patient Active Problem List    Diagnosis Date Noted Date Diagnosed    NSVT (nonsustained ventricular tachycardia) ([facility]) 2025     Ischemic cardiomyopathy 2025     Volume overload 2025     Mesenteric artery stenosis/recent mesenteric ischemia 2025     Acute on chronic heart failure with preserved ejection fraction (HFpEF) ([facility]) 2025     PAD (peripheral artery disease) ([facility]) 2025     Short-term memory loss 2025     Encephalopathy 2025     Insomnia 2025     Frailty syndrome in geriatric patient 2025     Ambulatory dysfunction 2025     SHOAIB (acute kidney injury) ([facility]) 2024     Portal Venous Gas 2024     Pneumatosis intestinalis 2024     Pancreatic insufficiency 2024     Mesenteric ischemia ([facility]) 2024     Nephrolithiasis 2024     Controlled type 2 diabetes mellitus with stage 4 chronic kidney disease, without long-term current use of insulin ([facility]) 2024     Hypertensive kidney disease with stage 4 chronic kidney disease ([facility]) 2024     Post-procedural respiratory complication 2024     Primary non-small cell carcinoma of lower lobe of left lung ([facility]) 2024     Peptic ulcer 01/10/2024     History of GI bleed 01/10/2024     Melena 2023     Chronic pancreatitis ([facility]) 2023     GI bleed 2023     Acute blood loss anemia 2023     Acute kidney injury superimposed on stage 4 chronic kidney disease ([facility]) 2023     Vitamin D deficiency 2023     Platelets decreased ([facility]) 2023     Anemia due to stage 4 chronic kidney disease  ([facility]) 2023     Hyperuricemia 2023     Benign hypertension with chronic kidney disease, stage  III ([facility]) 06/28/2022     Secondary hyperparathyroidism of renal origin ([facility]) 06/28/2022     S/P TAVR (transcatheter aortic valve replacement) 06/14/2022     Elevated serum creatinine 04/12/2022     Chronic diastolic CHF (congestive heart failure) ([facility]) 03/31/2022     Acute kidney injury superimposed on chronic kidney disease  ([facility]) 03/22/2022     Iron deficiency anemia 03/22/2022     Persistent proteinuria, unspecified 01/10/2022     CKD (chronic kidney disease) stage 4, GFR 15-29 ml/min ([facility]) 01/10/2022     Leukopenia 01/10/2022     Hypomagnesemia 01/10/2022     Renal cyst, left 01/10/2022     GERD (gastroesophageal reflux disease)      Hyperlipidemia      Type 2 diabetes mellitus with stage 4 chronic kidney disease, without long-term current use of insulin ([facility]) 06/01/2021     Sinus bradycardia 10/08/2020     Calcific tendinitis of right shoulder region 06/17/2019     Right shoulder pain 06/17/2019     Cigarette nicotine dependence with nicotine-induced disorder 10/22/2018     Atherosclerosis of native arteries of extremities with intermittent claudication, bilateral legs ([facility]) 08/12/2016     Stenosis of noncoronary bypass graft ([facility]) 08/12/2016     Asymptomatic bilateral carotid artery stenosis 08/12/2016     S/P CABG (coronary artery bypass graft) 08/12/2016     Primary hypertension 08/12/2016     Renal artery stenosis, native, bilateral ([facility]) 08/12/2016     Coronary artery disease involving native coronary artery of native heart without angina pectoris 08/12/2016     Progressive angina ([facility]) 08/12/2016     Tension headache 10/24/2013       LOS (days): 4  Geometric Mean LOS (GMLOS) (days): 3  Days to GMLOS:-1.2     OBJECTIVE:  Risk of Unplanned Readmission Score: 43.73         Current admission status: Inpatient   Preferred Pharmacy:   [facility]/pharmacy #[id number]  [address]  [place] [address]  Phone: [phone] Fax: [phone]    [email or web address] [facility] [address]  [facility]  [address] [facility] [address]  Phone: [phone] Fax: [phone]    [facility] - [place] - [address]  [address] [facility] [address]  [place] [address]  Phone: [phone] Fax: [phone]    Primary Care Provider: Simón Hadley MD    Primary Insurance: MEDICARE  Secondary Insurance: COMMERCIAL MISCELLANEOUS    DISCHARGE DETAILS:     IMM Given (Date):: 01/10/25  IMM Given to:: Patient

## 2025-01-10 NOTE — ASSESSMENT & PLAN NOTE
6/2022 TAVR  TTE 6/2023-Smith KYLER 3 ultra 29 TAVR bioprosthetic valve well-seated and appears function normal.  Trace paravalvular leak.  No evidence of transvalvular regurgitation.  Current TTE essentially unchanged  Continue medical management

## 2025-01-10 NOTE — ASSESSMENT & PLAN NOTE
Ct home meds Hydralazine 50 mg BID-->increased to 75 mg BID, Metoprolol tartrate 12.5 mg BID, Amlodipine 10 mg daily  Monitor /43

## 2025-01-10 NOTE — ASSESSMENT & PLAN NOTE
was persistently hypertensive  Currently on amlodipine 10 mg daily, hydralazine 50 mg twice daily, Lopressor 12.5 mg twice daily.  Additionally on IV Lasix.  Increased hydralazine 75mg BID, with adequate response.

## 2025-01-10 NOTE — ASSESSMENT & PLAN NOTE
Lab Results   Component Value Date    HGBA1C 6.6 (H) 12/24/2024       Recent Labs     01/09/25  1657 01/09/25  2121 01/10/25  0759 01/10/25  1125   POCGLU 116 149* 119 133       Blood Sugar Average: Last 72 hrs:  (P) 132.2811947119866976  Home regimen: Glimepiride 1 mg daily, Empagliflozin 10 mg daily - hold while inpatient  Sliding scale insulin  Monitor blood sugars and adjust insulin accordingly

## 2025-01-10 NOTE — ASSESSMENT & PLAN NOTE
Volume status has significantly improved with decrease in weight and marked improvement in peripheral edema on exam  Clinically still appears volume overloaded.  Recommend increasing torsemide to 40 mg daily.

## 2025-01-10 NOTE — ASSESSMENT & PLAN NOTE
Lab Results   Component Value Date    EGFR 26 01/10/2025    EGFR 27 01/09/2025    EGFR 31 01/08/2025    CREATININE 2.24 (H) 01/10/2025    CREATININE 2.19 (H) 01/09/2025    CREATININE 1.93 (H) 01/08/2025   Baseline creatinine 1.8 to 2.2  Had SHOAIB during recent admission  Diuretics as above for now with slight increase overnight now on po diuretics   Currently renal function is stable   Nephrology input appreciated

## 2025-01-10 NOTE — ASSESSMENT & PLAN NOTE
Lab Results   Component Value Date    EGFR 26 01/10/2025    EGFR 27 01/09/2025    EGFR 31 01/08/2025    CREATININE 2.24 (H) 01/10/2025    CREATININE 2.19 (H) 01/09/2025    CREATININE 1.93 (H) 01/08/2025   Baseline creatinine 1.8-2.2  Creatinine 2.1 from 1.9  Creatinine increased to 2.2 overnight

## 2025-01-10 NOTE — PROGRESS NOTES
Progress Note - Hospitalist   Name: Jay Roach Jr. 79 y.o. male I MRN: [id number]  Unit/Bed#: -01 I Date of Admission: 1/6/2025   Date of Service: 1/10/2025 I Hospital Day: 4    Assessment & Plan  Acute on chronic heart failure with preserved ejection fraction (HFpEF) ([facility])  Wt Readings from Last 3 Encounters:   01/10/25 75 kg (165 lb 5.5 oz)   01/03/25 83 kg (182 lb 14.3 oz)   12/24/24 80 kg (176 lb 5.9 oz)     Presents with worsening shortness of breath since discharge on 1/3/25 with lower extremity edema and abdominal distension  Hospitalized from 12/24/2024 to 1/3/2025 with mesenteric ischemia.  Underwent exploratory laparotomy, lysis of adhesions, mesenteric angiogram and retrograde open mesenteric stent placement on 12/24/24. During hospitalization had SHOAIB on CKD-4. Was discharge on Lasix 40 mg 3 times weekly on TTS  Echo (6/8/23) - EF 65%, grade 1 diastolic dysfunction  Repeat echo this admission stable     CXR - small pleural effusions  S/p IV diuresis, cardiology and nephrology following.  Transition to PO torsemide 1/9 with slight increase in creatinine  Pt with very frequent low volume voids asked nursing to please bladder scan (83ml now )   Pt with less than 10 cc feeling the urge (PVR 83ml)  Will obtain cbc and bmp in am   Monitor I/O, daily weights  Potassium and magnesium supplementation   CKD (chronic kidney disease) stage 4, GFR 15-29 ml/min ([facility])  Lab Results   Component Value Date    EGFR 26 01/10/2025    EGFR 27 01/09/2025    EGFR 31 01/08/2025    CREATININE 2.24 (H) 01/10/2025    CREATININE 2.19 (H) 01/09/2025    CREATININE 1.93 (H) 01/08/2025   Baseline creatinine 1.8 to 2.2  Had SHOAIB during recent admission  Diuretics as above for now with slight increase overnight now on po diuretics   Currently renal function is stable   Nephrology input appreciated  NSVT (nonsustained ventricular tachycardia) ([facility])  Noted run -- replace K and Mg  Monitor on tele   Coronary artery disease  involving native coronary artery of native heart without angina pectoris  S/p CABG  PCI to RCA in 2022 (pre-TAVR)   Ct Plavix, statin, BB  Primary hypertension  Ct home meds Hydralazine 50 mg BID-->increased to 75 mg BID, Metoprolol tartrate 12.5 mg BID, Amlodipine 10 mg daily  Monitor /43  Mesenteric artery stenosis/recent mesenteric ischemia  Recent admission as above - s/p retrograde open mesenteric stent placement on 12/24/24  Ct Plavix, statin  Type 2 diabetes mellitus with stage 4 chronic kidney disease, without long-term current use of insulin ([facility])  Lab Results   Component Value Date    HGBA1C 6.6 (H) 12/24/2024       Recent Labs     01/09/25  1657 01/09/25  2121 01/10/25  0759 01/10/25  1125   POCGLU 116 149* 119 133       Blood Sugar Average: Last 72 hrs:  (P) [id number]  Home regimen: Glimepiride 1 mg daily, Empagliflozin 10 mg daily - hold while inpatient  Sliding scale insulin  Monitor blood sugars and adjust insulin accordingly  GERD (gastroesophageal reflux disease)  Ct PPI  S/P TAVR (transcatheter aortic valve replacement)  In 2022  F/u echo  Primary non-small cell carcinoma of lower lobe of left lung (HCC)  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 diagnosed in April 2024  Completed definitive SBRT on 5/30/2024  Follows with radiation oncology   History of GI bleed  H/o gastric ulcers s/p clipping  Ct PPI  PAD (peripheral artery disease) ([facility])  S/p bilateral lower extremity revascularization  Hypomagnesemia  Replacement ordered     VTE Pharmacologic Prophylaxis: VTE Score: 4 High Risk (Score >/= 5) - Pharmacological DVT Prophylaxis Ordered: heparin. Sequential Compression Devices Ordered.    Mobility:   Basic Mobility Inpatient Raw Score: 18  JH-HLM Goal: 6: Walk 10 steps or more  JH-HLM Achieved: 6: Walk 10 steps or more  JH-HLM Goal achieved. Continue to encourage appropriate mobility.    Patient Centered Rounds: I performed bedside rounds with nursing staff today.    Discussions with Specialists or Other Care Team Provider: nursing     Education and Discussions with Family / Patient:  patient.     Current Length of Stay: 4 day(s)  Current Patient Status: Inpatient   Certification Statement: The patient will continue to require additional inpatient hospital stay due to need to clarify medications son bringing in medication monitor for additional night on oral diuretics   Discharge Plan: Anticipate discharge in 24-48 hrs to home with home services.    Code Status: Level 1 - Full Code    Subjective   Pt sitting in the chair. Reports frequency voiding small amts little pvr when voided , no n/v/d feel much better other wise .     Objective :  Temp:  [97.9 °F (36.6 °C)-98.7 °F (37.1 °C)] 98.2 °F (36.8 °C)  HR:  [56-67] 56  BP: (124-151)/(42-53) 124/43  Resp:  [18-20] 18  SpO2:  [91 %-95 %] 91 %  O2 Device: None (Room air)    Body mass index is 23.72 kg/m².     Input and Output Summary (last 24 hours):     Intake/Output Summary (Last 24 hours) at 1/10/2025 1303  Last data filed at 1/10/2025 1252  Gross per 24 hour   Intake 540 ml   Output 850 ml   Net -310 ml       Physical Exam  Constitutional:       General: He is not in acute distress.     Appearance: He is not ill-appearing, toxic-appearing or diaphoretic.   HENT:      Head: Normocephalic.   Eyes:      General:         Right eye: No discharge.         Left eye: No discharge.   Cardiovascular:      Rate and Rhythm: Normal rate.      Heart sounds: Murmur heard.      No friction rub. No gallop.   Pulmonary:      Effort: No respiratory distress.      Breath sounds: No stridor. Rales present. No wheezing or rhonchi.   Chest:      Chest wall: No tenderness.   Abdominal:      General: There is no distension.      Palpations: There is no mass.      Tenderness: There is no abdominal tenderness. There is no guarding or rebound.      Hernia: No hernia is present.   Musculoskeletal:         General: No swelling, tenderness or deformity.       Right lower leg: No edema.      Left lower leg: No edema.   Skin:     Coloration: Skin is not jaundiced or pale.      Findings: No bruising, erythema, lesion or rash.   Neurological:      Mental Status: He is alert and oriented to person, place, and time.   Psychiatric:         Behavior: Behavior normal.           Lines/Drains:              Lab Results: I have reviewed the following results:   Results from last 7 days   Lab Units 01/07/25  0626 01/06/25  1205   WBC Thousand/uL 4.23* 4.60   HEMOGLOBIN g/dL 7.8* 7.5*   HEMATOCRIT % 24.2* 23.5*   PLATELETS Thousands/uL 217 220   SEGS PCT %  --  76*   LYMPHO PCT %  --  13*   MONO PCT %  --  8   EOS PCT %  --  2     Results from last 7 days   Lab Units 01/10/25  0605 01/07/25  0626 01/06/25  1205   SODIUM mmol/L 139   < > 142   POTASSIUM mmol/L 4.3   < > 3.6   CHLORIDE mmol/L 104   < > 112*   CO2 mmol/L 28   < > 25   BUN mg/dL 29*   < > 26*   CREATININE mg/dL 2.24*   < > 1.81*   ANION GAP mmol/L 7   < > 5   CALCIUM mg/dL 8.1*   < > 7.9*   ALBUMIN g/dL  --   --  2.9*   TOTAL BILIRUBIN mg/dL  --   --  0.48   ALK PHOS U/L  --   --  68   ALT U/L  --   --  20   AST U/L  --   --  19   GLUCOSE RANDOM mg/dL 116   < > 126    < > = values in this interval not displayed.     Results from last 7 days   Lab Units 01/06/25  1205   INR  1.01     Results from last 7 days   Lab Units 01/10/25  1125 01/10/25  0759 01/09/25  2121 01/09/25  1657 01/09/25  1142 01/09/25  0809 01/08/25  2112 01/08/25  1647 01/08/25  1136 01/08/25  0800 01/07/25  2052 01/07/25  1657   POC GLUCOSE mg/dl 133 119 149* 116 119 119 143* 131 146* 118 162* 137         Results from last 7 days   Lab Units 01/06/25  1205   LACTIC ACID mmol/L 0.8   PROCALCITONIN ng/ml 0.22       Recent Cultures (last 7 days):         Imaging Results Review: No pertinent imaging studies reviewed.  Other Study Results Review: No additional pertinent studies reviewed.    Last 24 Hours Medication List:     Current Facility-Administered  Medications:     acetaminophen (TYLENOL) tablet 650 mg, Q4H PRN    allopurinol (ZYLOPRIM) tablet 300 mg, Daily    amLODIPine (NORVASC) tablet 10 mg, Daily    atorvastatin (LIPITOR) tablet 80 mg, HS    calcitriol (ROCALTROL) capsule 0.25 mcg, Daily    clopidogrel (PLAVIX) tablet 75 mg, Daily    ferrous sulfate tablet 325 mg, Every Other Day    heparin (porcine) subcutaneous injection 5,000 Units, Q8H TRENT    hydrALAZINE (APRESOLINE) tablet 75 mg, BID    insulin lispro (HumALOG/ADMELOG) 100 units/mL subcutaneous injection 1-5 Units, TID AC **AND** Fingerstick Glucose (POCT), TID AC    magnesium Oxide (MAG-OX) tablet 400 mg, BID    metoprolol tartrate (LOPRESSOR) partial tablet 12.5 mg, Q12H TRENT    pancrelipase (Lip-Prot-Amyl) (CREON) delayed release capsule 24,000 Units, TID With Meals    pantoprazole (PROTONIX) EC tablet 40 mg, BID    potassium chloride (Klor-Con M20) CR tablet 20 mEq, BID    Administrative Statements   Today, Patient Was Seen By: ZHANNA Barth      **Please Note: This note may have been constructed using a voice recognition system.**

## 2025-01-10 NOTE — PLAN OF CARE
Problem: PAIN - ADULT  Goal: Verbalizes/displays adequate comfort level or baseline comfort level  Description: Interventions:  - Encourage patient to monitor pain and request assistance  - Assess pain using appropriate pain scale  - Administer analgesics based on type and severity of pain and evaluate response  - Implement non-pharmacological measures as appropriate and evaluate response  - Consider cultural and social influences on pain and pain management  - Notify physician/advanced practitioner if interventions unsuccessful or patient reports new pain  Outcome: Progressing     Problem: INFECTION - ADULT  Goal: Absence or prevention of progression during hospitalization  Description: INTERVENTIONS:  - Assess and monitor for signs and symptoms of infection  - Monitor lab/diagnostic results  - Monitor all insertion sites, i.e. indwelling lines, tubes, and drains  - Monitor endotracheal if appropriate and nasal secretions for changes in amount and color  - Mandan appropriate cooling/warming therapies per order  - Administer medications as ordered  - Instruct and encourage patient and family to use good hand hygiene technique  - Identify and instruct in appropriate isolation precautions for identified infection/condition  Outcome: Progressing  Goal: Absence of fever/infection during neutropenic period  Description: INTERVENTIONS:  - Monitor WBC    Outcome: Progressing     Problem: SAFETY ADULT  Goal: Patient will remain free of falls  Description: INTERVENTIONS:  - Educate patient/family on patient safety including physical limitations  - Instruct patient to call for assistance with activity   - Consult OT/PT to assist with strengthening/mobility   - Keep Call bell within reach  - Keep bed low and locked with side rails adjusted as appropriate  - Keep care items and personal belongings within reach  - Initiate and maintain comfort rounds  - Make Fall Risk Sign visible to staff  - Offer Toileting every 4 Hours,  in advance of need  - Initiate/Maintain 4alarm  - Obtain necessary fall risk management equipment: 4  - Apply yellow socks and bracelet for high fall risk patients  - Consider moving patient to room near nurses station  Outcome: Progressing  Goal: Maintain or return to baseline ADL function  Description: INTERVENTIONS:  -  Assess patient's ability to carry out ADLs; assess patient's baseline for ADL function and identify physical deficits which impact ability to perform ADLs (bathing, care of mouth/teeth, toileting, grooming, dressing, etc.)  - Assess/evaluate cause of self-care deficits   - Assess range of motion  - Assess patient's mobility; develop plan if impaired  - Assess patient's need for assistive devices and provide as appropriate  - Encourage maximum independence but intervene and supervise when necessary  - Involve family in performance of ADLs  - Assess for home care needs following discharge   - Consider OT consult to assist with ADL evaluation and planning for discharge  - Provide patient education as appropriate  Outcome: Progressing  Goal: Maintains/Returns to pre admission functional level  Description: INTERVENTIONS:  - Perform AM-PAC 6 Click Basic Mobility/ Daily Activity assessment daily.  - Set and communicate daily mobility goal to care team and patient/family/caregiver.   - Collaborate with rehabilitation services on mobility goals if consulted  - Perform Range of Motion 4 times a day.  - Reposition patient every 4 hours.  - Dangle patient 4 times a day  - Stand patient 4 times a day  - Ambulate patient 4 times a day  - Out of bed to chair 4 times a day   - Out of bed for meals 4 times a day  - Out of bed for toileting  - Record patient progress and toleration of activity level   Outcome: Progressing     Problem: DISCHARGE PLANNING  Goal: Discharge to home or other facility with appropriate resources  Description: INTERVENTIONS:  - Identify barriers to discharge w/patient and caregiver  -  Arrange for needed discharge resources and transportation as appropriate  - Identify discharge learning needs (meds, wound care, etc.)  - Arrange for interpretive services to assist at discharge as needed  - Refer to Case Management Department for coordinating discharge planning if the patient needs post-hospital services based on physician/advanced practitioner order or complex needs related to functional status, cognitive ability, or social support system  Outcome: Progressing     Problem: Knowledge Deficit  Goal: Patient/family/caregiver demonstrates understanding of disease process, treatment plan, medications, and discharge instructions  Description: Complete learning assessment and assess knowledge base.  Interventions:  - Provide teaching at level of understanding  - Provide teaching via preferred learning methods  Outcome: Progressing

## 2025-01-10 NOTE — ASSESSMENT & PLAN NOTE
Patient was discharged from the hospital 1/3.  He reports the start of shortness of breath shortly thereafter.  He said he was experiencing swelling legs when he was discharged.   Previous diuretic dose torsemide 10 mg daily.  He was discharged on Lasix 40 mg 3 times a week directed by nephrology.  Had not taken that dose yet, admitted within several days of discharge. Jardiance on hold   Chest x-ray small bilateral pleural effusions  .  No recent BNP for comparison.  Presents with negative troponins negative.   TTE 6/2023 LVEF 65%.  Grade 1 diastolic dysfunction.  No significant valvular heart disease.  RV size and function normal.    Initially on IV Lasix 40 mg twice daily.  Net - 1155 24 hr.  Weight 165 lbs. Stated pre hospital 173.bs.   Nephrology started on torsemide yesterday afternoon.  He did have a good diuretic response overnight, however his creatinine has increased slightly  We will await nephrology recommendations regarding home dosing of p.o. diuretic

## 2025-01-10 NOTE — ASSESSMENT & PLAN NOTE
Up to 18 beat  NSVT in the setting of low potassium and magnesium  No NSVT in over 24 hours  Mg now repleted to 2

## 2025-01-10 NOTE — PROGRESS NOTES
Cardiology Progress Note - Jay Roach Jr. 79 y.o. male MRN: [id number]    Unit/Bed#: -01 Encounter: [id number]        Assessment & Plan  Acute on chronic heart failure with preserved ejection fraction (HFpEF) ([facility])  Patient was discharged from the hospital 1/3.  He reports the start of shortness of breath shortly thereafter.  He said he was experiencing swelling legs when he was discharged.   Previous diuretic dose torsemide 10 mg daily.  He was discharged on Lasix 40 mg 3 times a week directed by nephrology.  Had not taken that dose yet, admitted within several days of discharge. Jardiance on hold   Chest x-ray small bilateral pleural effusions  .  No recent BNP for comparison.  Presents with negative troponins negative.   TTE 6/2023 LVEF 65%.  Grade 1 diastolic dysfunction.  No significant valvular heart disease.  RV size and function normal.    Initially on IV Lasix 40 mg twice daily.  Net - 1155 24 hr.  Weight 165 lbs. Stated pre hospital 173.bs.   Nephrology started on torsemide yesterday afternoon.  He did have a good diuretic response overnight, however his creatinine has increased slightly  We will await nephrology recommendations regarding home dosing of p.o. diuretic  Coronary artery disease involving native coronary artery of native heart without angina pectoris  History of CABG.  Coronary angiogram 5/2022 patent LIMA to LAD and patent SVG to OM.  Graft to proximal RCA required BURAK  On aspirin and Plavix, beta-blocker and statin.  Troponins negative  ECG sinus rhythm first-degree block PVC  No active chest pain  Type 2 diabetes mellitus with stage 4 chronic kidney disease, without long-term current use of insulin ([facility])  Lab Results   Component Value Date    HGBA1C 6.6 (H) 12/24/2024   Management as per primary team  CKD (chronic kidney disease) stage 4, GFR 15-29 ml/min ([facility])  Lab Results   Component Value Date    EGFR 26 01/10/2025    EGFR 27 01/09/2025    EGFR 31 01/08/2025     CREATININE 2.24 (H) 01/10/2025    CREATININE 2.19 (H) 01/09/2025    CREATININE 1.93 (H) 01/08/2025   Baseline creatinine 1.8-2.2  Creatinine 2.1 from 1.9  Creatinine increased to 2.2 overnight  S/P TAVR (transcatheter aortic valve replacement)  6/2022 TAVR  TTE 6/2023-Smith KYLER 3 ultra 29 TAVR bioprosthetic valve well-seated and appears function normal.  Trace paravalvular leak.  No evidence of transvalvular regurgitation.  Current TTE essentially unchanged  Continue medical management  History of GI bleed  H&H stable.  7.8/24.2  Primary non-small cell carcinoma of lower lobe of left lung (HCC)  Management as per oncology team in the outpatient setting  Hypertensive kidney disease with stage 4 chronic kidney disease ([facility])  Blood pressures had been consistently elevated.  Increased hydralazine to 75 mg twice daily   On amlodipine 10 mg daily, Lopressor 12.5 mg twice a day, hydralazine increased as mentioned  Improving control with current regiment  Mesenteric artery stenosis/recent mesenteric ischemia  Status post exploratory lap 12/24, ADA, mesenteric agram , retrograde open mesenteric stent  NSVT (nonsustained ventricular tachycardia) ([facility])  Up to 18 beat  NSVT in the setting of low potassium and magnesium  No NSVT in over 24 hours  Mg now repleted to 2  Primary hypertension  was persistently hypertensive  Currently on amlodipine 10 mg daily, hydralazine 50 mg twice daily, Lopressor 12.5 mg twice daily.  Additionally on IV Lasix.  Increased hydralazine 75mg BID, with adequate response.   GERD (gastroesophageal reflux disease)  Continued on Protonix  Hypomagnesemia  Now repleted  Ischemic cardiomyopathy  Ejection fraction normal on most recent echocardiogram  Volume overload  In the setting of acute on chronic diastolic heart failure  PAD (peripheral artery disease) ([facility])  Medical management with aspirin, statin, Plavix, and beta-blocker    Subjective:   Patient seen and examined.  No significant events  "overnight.  ; pertinent negatives - chest pain, chest pressure/discomfort, irregular heart beat, near-syncope, and palpitations.    Objective:     Vitals: Blood pressure 135/53, pulse 64, temperature 98.4 °F (36.9 °C), resp. rate 18, height 5' 10\" (1.778 m), weight 75 kg (165 lb 5.5 oz), SpO2 94%., Body mass index is 23.72 kg/m².,   Orthostatic Blood Pressures      Flowsheet Row Most Recent Value   Blood Pressure 135/53 filed at 01/10/2025 0759   Patient Position - Orthostatic VS Sitting filed at 01/08/2025 0755              Intake/Output Summary (Last 24 hours) at 1/10/2025 0903  Last data filed at 1/10/2025 0501  Gross per 24 hour   Intake 120 ml   Output 1275 ml   Net -1155 ml       TELE: Occasional Mobitz 1 heart block    Physical Exam:    GEN: Jay Roach Jr. appears well, alert and oriented x 3, pleasant and cooperative   HEENT: pupils equal, round, and reactive to light; extraocular muscles intact  NECK: supple, no carotid bruits   HEART: regular rhythm, normal S1 and S2, + systolic murmur, no clicks, gallops or rubs   LUNGS: clear to auscultation bilaterally; no wheezes, rales, or rhonchi   ABDOMEN: normal bowel sounds, soft, no tenderness, no distention  EXTREMITIES: peripheral pulses normal; no clubbing, cyanosis, + trace edema  NEURO: no focal findings   SKIN: normal without suspicious lesions on exposed skin    Medications:      Current Facility-Administered Medications:     acetaminophen (TYLENOL) tablet 650 mg, 650 mg, Oral, Q4H PRN, Karen Vallejo MD    allopurinol (ZYLOPRIM) tablet 300 mg, 300 mg, Oral, Daily, Karen Vallejo MD, 300 mg at 01/09/25 0826    amLODIPine (NORVASC) tablet 10 mg, 10 mg, Oral, Daily, Karen Vallejo MD, 10 mg at 01/09/25 0825    atorvastatin (LIPITOR) tablet 80 mg, 80 mg, Oral, HS, Karen Vallejo MD, 80 mg at 01/09/25 2122    calcitriol (ROCALTROL) capsule 0.25 mcg, 0.25 mcg, Oral, Daily, Karen Vallejo MD, 0.25 mcg at 01/09/25 0826    clopidogrel " (PLAVIX) tablet 75 mg, 75 mg, Oral, Daily, Karen Vallejo MD, 75 mg at 01/09/25 0825    ferrous sulfate tablet 325 mg, 325 mg, Oral, Every Other Day, Karen Vallejo MD, 325 mg at 01/09/25 0825    heparin (porcine) subcutaneous injection 5,000 Units, 5,000 Units, Subcutaneous, Q8H [facility], Karen Vallejo MD, 5,000 Units at 01/10/25 0558    hydrALAZINE (APRESOLINE) tablet 75 mg, 75 mg, Oral, BID, ZHANNA Alvarez, 75 mg at 01/09/25 2119    insulin lispro (HumALOG/ADMELOG) 100 units/mL subcutaneous injection 1-5 Units, 1-5 Units, Subcutaneous, TID AC **AND** Fingerstick Glucose (POCT), , , TID AC, Karen Vallejo MD    magnesium Oxide (MAG-OX) tablet 400 mg, 400 mg, Oral, BID, Harpal Calhoun MD, 400 mg at 01/09/25 1634    metoprolol tartrate (LOPRESSOR) partial tablet 12.5 mg, 12.5 mg, Oral, Q12H [facility], Karen Vallejo MD, 12.5 mg at 01/09/25 2119    pancrelipase (Lip-Prot-Amyl) (CREON) delayed release capsule 24,000 Units, 24,000 Units, Oral, TID With Meals, Karen Vallejo MD, 24,000 Units at 01/10/25 0752    pantoprazole (PROTONIX) EC tablet 40 mg, 40 mg, Oral, BID, Karen Vallejo MD, 40 mg at 01/09/25 2119    potassium chloride (Klor-Con M20) CR tablet 20 mEq, 20 mEq, Oral, BID, ZHANNA Alvarez, 20 mEq at 01/09/25 1634     Labs & Results:        Results from last 7 days   Lab Units 01/07/25  0626 01/06/25  1205   WBC Thousand/uL 4.23* 4.60   HEMOGLOBIN g/dL 7.8* 7.5*   HEMATOCRIT % 24.2* 23.5*   PLATELETS Thousands/uL 217 220         Results from last 7 days   Lab Units 01/10/25  0605 01/09/25  0529 01/08/25  0510 01/07/25  0626 01/06/25  1205   POTASSIUM mmol/L 4.3 4.0 3.9   < > 3.6   CHLORIDE mmol/L 104 106 106   < > 112*   CO2 mmol/L 28 28 27   < > 25   BUN mg/dL 29* 28* 28*   < > 26*   CREATININE mg/dL 2.24* 2.19* 1.93*   < > 1.81*   CALCIUM mg/dL 8.1* 7.9* 8.2*   < > 7.9*   ALK PHOS U/L  --   --   --   --  68   ALT U/L  --   --   --   --  20   AST U/L  --   --   --   --   19    < > = values in this interval not displayed.     Results from last 7 days   Lab Units 01/06/25  1205   INR  1.01   PTT seconds 28     Results from last 7 days   Lab Units 01/10/25  0605 01/09/25  0529 01/07/25  0626   MAGNESIUM mg/dL 2.0 1.6* 1.8*       Echo: personally reviewed -normal LV size, mild LVH, normal EF.  Grade 1 diastolic dysfunction.  Left atrial enlargement.  TAVR bioprosthesis with mean gradient of 7 mmHg.    EKG personally reviewed by Pau Lima MD.

## 2025-01-10 NOTE — ASSESSMENT & PLAN NOTE
History of CABG.  Coronary angiogram 5/2022 patent LIMA to LAD and patent SVG to OM.  Graft to proximal RCA required BURAK  On aspirin and Plavix, beta-blocker and statin.  Troponins negative  ECG sinus rhythm first-degree block PVC  No active chest pain

## 2025-01-10 NOTE — PLAN OF CARE
Patient has no change in assessment.          Problem: PAIN - ADULT  Goal: Verbalizes/displays adequate comfort level or baseline comfort level  Description: Interventions:  - Encourage patient to monitor pain and request assistance  - Assess pain using appropriate pain scale  - Administer analgesics based on type and severity of pain and evaluate response  - Implement non-pharmacological measures as appropriate and evaluate response  - Consider cultural and social influences on pain and pain management  - Notify physician/advanced practitioner if interventions unsuccessful or patient reports new pain  Outcome: Progressing

## 2025-01-10 NOTE — ASSESSMENT & PLAN NOTE
Blood pressures had been consistently elevated.  Increased hydralazine to 75 mg twice daily   On amlodipine 10 mg daily, Lopressor 12.5 mg twice a day, hydralazine increased as mentioned  Improving control with current regiment

## 2025-01-10 NOTE — ASSESSMENT & PLAN NOTE
Chronic kidney disease with previous baseline creatinine reported at 1.8-2.2.  Renal function remains stable with a creatinine of 2.24.

## 2025-01-10 NOTE — PROGRESS NOTES
NEPHROLOGY HOSPITAL PROGRESS NOTE   Jay Roach Jr. 79 y.o. male MRN: [id number]  Unit/Bed#: -01 Encounter: [id number]  Reason for Consult: CKD    Assessment & Plan  CKD (chronic kidney disease) stage 4, GFR 15-29 ml/min (HCC)  Chronic kidney disease with previous baseline creatinine reported at 1.8-2.2.  Renal function remains stable with a creatinine of 2.24.  Ischemic cardiomyopathy  Recent echocardiogram showing ejection fraction of 65% with abnormal diastolic function.  Known history of TAVR.  No significant regurgitation.  Volume overload  Volume status has significantly improved with decrease in weight and marked improvement in peripheral edema on exam  Clinically still appears volume overloaded.  Recommend increasing torsemide to 40 mg daily.  Hypertensive kidney disease with stage 4 chronic kidney disease (HCC)  Blood pressure currently appears to be stable but somewhat elevated.  Overall, blood pressure improving with diuresis.    Currently acceptable, no changes in his current regimen.  Mesenteric artery stenosis/recent mesenteric ischemia  Recent admission for abdominal pain with exploratory laparotomy with retrograde open SMA stent placement.  Management as per surgery.  Hypomagnesemia  Continue oral replacement but increase dose    Summary:  Overall renal function remains stable  Clinically still appears volume overloaded.  Torsemide 40 mg daily.    SUBJECTIVE / 24H INTERVAL HISTORY:  Seen and examined.  Patient awake and alert.  Overall is doing reasonably well denies any chest pain shortness of breath.  Lower extremity swelling markedly improved.    OBJECTIVE:  Current Weight: Weight - Scale: 75 kg (165 lb 5.5 oz)  Vitals:    01/10/25 0307 01/10/25 0600 01/10/25 0759 01/10/25 1127   BP: (!) 148/42  135/53 (!) 124/43   Pulse: 67  64 56   Resp:   18 18   Temp: 98 °F (36.7 °C)  98.4 °F (36.9 °C) 98.2 °F (36.8 °C)   TempSrc:       SpO2: 92%  94% 91%   Weight:  75 kg (165 lb 5.5 oz)      Height:           Intake/Output Summary (Last 24 hours) at 1/10/2025 1554  Last data filed at 1/10/2025 1252  Gross per 24 hour   Intake 420 ml   Output 650 ml   Net -230 ml       Physical Exam  Eyes:      General: No scleral icterus.  Cardiovascular:      Rate and Rhythm: Normal rate and regular rhythm.   Pulmonary:      Effort: Pulmonary effort is normal.      Breath sounds: Rales present.   Abdominal:      General: There is no distension.      Palpations: Abdomen is soft.      Tenderness: There is no abdominal tenderness.   Musculoskeletal:      Right lower leg: No edema.      Left lower leg: No edema.   Skin:     General: Skin is warm and dry.      Findings: No rash.   Neurological:      Mental Status: He is alert and oriented to person, place, and time.         Medications:    Current Facility-Administered Medications:     acetaminophen (TYLENOL) tablet 650 mg, 650 mg, Oral, Q4H PRN, Karen Vallejo MD    allopurinol (ZYLOPRIM) tablet 300 mg, 300 mg, Oral, Daily, Karen Vallejo MD, 300 mg at 01/10/25 0934    amLODIPine (NORVASC) tablet 10 mg, 10 mg, Oral, Daily, Karen Vallejo MD, 10 mg at 01/10/25 0933    atorvastatin (LIPITOR) tablet 80 mg, 80 mg, Oral, HS, Karen Vallejo MD, 80 mg at 01/09/25 2122    calcitriol (ROCALTROL) capsule 0.25 mcg, 0.25 mcg, Oral, Daily, Karen Valleoj MD, 0.25 mcg at 01/10/25 0934    clopidogrel (PLAVIX) tablet 75 mg, 75 mg, Oral, Daily, Karen Vallejo MD, 75 mg at 01/10/25 0933    ferrous sulfate tablet 325 mg, 325 mg, Oral, Every Other Day, Karen Vallejo MD, 325 mg at 01/09/25 0825    heparin (porcine) subcutaneous injection 5,000 Units, 5,000 Units, Subcutaneous, Q8H TRENT, Karen Vallejo MD, 5,000 Units at 01/10/25 1418    hydrALAZINE (APRESOLINE) tablet 75 mg, 75 mg, Oral, BID, ZHANNA Alvarez, 75 mg at 01/10/25 0933    insulin lispro (HumALOG/ADMELOG) 100 units/mL subcutaneous injection 1-5 Units, 1-5 Units, Subcutaneous, TID AC **AND**  "Fingerstick Glucose (POCT), , , TID AC, Karen Vallejo MD    magnesium Oxide (MAG-OX) tablet 400 mg, 400 mg, Oral, BID, Harpal Calhoun MD, 400 mg at 01/10/25 0933    metoprolol tartrate (LOPRESSOR) partial tablet 12.5 mg, 12.5 mg, Oral, Q12H TRENT, Karen Vallejo MD, 12.5 mg at 01/10/25 0933    pancrelipase (Lip-Prot-Amyl) (CREON) delayed release capsule 24,000 Units, 24,000 Units, Oral, TID With Meals, Karen Vallejo MD, 24,000 Units at 01/10/25 1156    pantoprazole (PROTONIX) EC tablet 40 mg, 40 mg, Oral, BID, Karen Vallejo MD, 40 mg at 01/10/25 0933    potassium chloride (Klor-Con M20) CR tablet 20 mEq, 20 mEq, Oral, BID, ZHANNA Alvarez, 20 mEq at 01/10/25 0933    torsemide (DEMADEX) tablet 40 mg, 40 mg, Oral, Daily, Holland Lucas, DO    Laboratory Results:  Results from last 7 days   Lab Units 01/10/25  0605 01/09/25  0529 01/08/25  0510 01/07/25  0626 01/06/25  1205   WBC Thousand/uL  --   --   --  4.23* 4.60   HEMOGLOBIN g/dL  --   --   --  7.8* 7.5*   HEMATOCRIT %  --   --   --  24.2* 23.5*   PLATELETS Thousands/uL  --   --   --  217 220   POTASSIUM mmol/L 4.3 4.0 3.9 3.4* 3.6   CHLORIDE mmol/L 104 106 106 110* 112*   CO2 mmol/L 28 28 27 27 25   BUN mg/dL 29* 28* 28* 25 26*   CREATININE mg/dL 2.24* 2.19* 1.93* 1.80* 1.81*   CALCIUM mg/dL 8.1* 7.9* 8.2* 7.9* 7.9*   MAGNESIUM mg/dL 2.0 1.6*  --  1.8* 1.6*       Portions of the record may have been created with voice recognition software. Occasional wrong word or \"sound a like\" substitutions may have occurred due to the inherent limitations of voice recognition software. Read the chart carefully and recognize, using context, where substitutions have occurred.If you have any questions, please contact the dictating provider.  "

## 2025-01-10 NOTE — ASSESSMENT & PLAN NOTE
Blood pressure currently appears to be stable but somewhat elevated.  Overall, blood pressure improving with diuresis.    Currently acceptable, no changes in his current regimen.

## 2025-01-11 VITALS
HEART RATE: 69 BPM | WEIGHT: 159.83 LBS | SYSTOLIC BLOOD PRESSURE: 140 MMHG | RESPIRATION RATE: 16 BRPM | BODY MASS INDEX: 22.88 KG/M2 | OXYGEN SATURATION: 95 % | DIASTOLIC BLOOD PRESSURE: 47 MMHG | TEMPERATURE: 97.2 F | HEIGHT: 70 IN

## 2025-01-11 LAB
ANION GAP SERPL CALCULATED.3IONS-SCNC: 8 MMOL/L (ref 4–13)
BASOPHILS # BLD AUTO: 0.02 THOUSANDS/ΜL (ref 0–0.1)
BASOPHILS NFR BLD AUTO: 0 % (ref 0–1)
BUN SERPL-MCNC: 38 MG/DL (ref 5–25)
CALCIUM SERPL-MCNC: 8.4 MG/DL (ref 8.4–10.2)
CHLORIDE SERPL-SCNC: 103 MMOL/L (ref 96–108)
CO2 SERPL-SCNC: 29 MMOL/L (ref 21–32)
CREAT SERPL-MCNC: 2.47 MG/DL (ref 0.6–1.3)
EOSINOPHIL # BLD AUTO: 0.13 THOUSAND/ΜL (ref 0–0.61)
EOSINOPHIL NFR BLD AUTO: 2 % (ref 0–6)
ERYTHROCYTE [DISTWIDTH] IN BLOOD BY AUTOMATED COUNT: 16.3 % (ref 11.6–15.1)
GFR SERPL CREATININE-BSD FRML MDRD: 23 ML/MIN/1.73SQ M
GLUCOSE SERPL-MCNC: 120 MG/DL (ref 65–140)
GLUCOSE SERPL-MCNC: 124 MG/DL (ref 65–140)
GLUCOSE SERPL-MCNC: 153 MG/DL (ref 65–140)
HCT VFR BLD AUTO: 24.3 % (ref 36.5–49.3)
HGB BLD-MCNC: 7.9 G/DL (ref 12–17)
IMM GRANULOCYTES # BLD AUTO: 0.02 THOUSAND/UL (ref 0–0.2)
IMM GRANULOCYTES NFR BLD AUTO: 0 % (ref 0–2)
LYMPHOCYTES # BLD AUTO: 0.96 THOUSANDS/ΜL (ref 0.6–4.47)
LYMPHOCYTES NFR BLD AUTO: 17 % (ref 14–44)
MCH RBC QN AUTO: 31.7 PG (ref 26.8–34.3)
MCHC RBC AUTO-ENTMCNC: 32.5 G/DL (ref 31.4–37.4)
MCV RBC AUTO: 98 FL (ref 82–98)
MONOCYTES # BLD AUTO: 0.52 THOUSAND/ΜL (ref 0.17–1.22)
MONOCYTES NFR BLD AUTO: 9 % (ref 4–12)
NEUTROPHILS # BLD AUTO: 4.13 THOUSANDS/ΜL (ref 1.85–7.62)
NEUTS SEG NFR BLD AUTO: 72 % (ref 43–75)
NRBC BLD AUTO-RTO: 0 /100 WBCS
PLATELET # BLD AUTO: 189 THOUSANDS/UL (ref 149–390)
PMV BLD AUTO: 10.7 FL (ref 8.9–12.7)
POTASSIUM SERPL-SCNC: 4.2 MMOL/L (ref 3.5–5.3)
RBC # BLD AUTO: 2.49 MILLION/UL (ref 3.88–5.62)
SODIUM SERPL-SCNC: 140 MMOL/L (ref 135–147)
WBC # BLD AUTO: 5.78 THOUSAND/UL (ref 4.31–10.16)

## 2025-01-11 PROCEDURE — 80048 BASIC METABOLIC PNL TOTAL CA: CPT | Performed by: NURSE PRACTITIONER

## 2025-01-11 PROCEDURE — 85025 COMPLETE CBC W/AUTO DIFF WBC: CPT | Performed by: NURSE PRACTITIONER

## 2025-01-11 PROCEDURE — 82948 REAGENT STRIP/BLOOD GLUCOSE: CPT

## 2025-01-11 PROCEDURE — 99232 SBSQ HOSP IP/OBS MODERATE 35: CPT | Performed by: INTERNAL MEDICINE

## 2025-01-11 PROCEDURE — 99239 HOSP IP/OBS DSCHRG MGMT >30: CPT | Performed by: NURSE PRACTITIONER

## 2025-01-11 RX ORDER — POTASSIUM CHLORIDE 1500 MG/1
20 TABLET, EXTENDED RELEASE ORAL DAILY
Status: DISCONTINUED | OUTPATIENT
Start: 2025-01-12 | End: 2025-01-11 | Stop reason: HOSPADM

## 2025-01-11 RX ORDER — LANOLIN ALCOHOL/MO/W.PET/CERES
400 CREAM (GRAM) TOPICAL 2 TIMES DAILY
Qty: 60 TABLET | Refills: 0 | Status: SHIPPED | OUTPATIENT
Start: 2025-01-11

## 2025-01-11 RX ORDER — HYDRALAZINE HYDROCHLORIDE 25 MG/1
75 TABLET, FILM COATED ORAL 2 TIMES DAILY
Qty: 180 TABLET | Refills: 0 | Status: SHIPPED | OUTPATIENT
Start: 2025-01-11

## 2025-01-11 RX ORDER — TORSEMIDE 20 MG/1
20 TABLET ORAL DAILY
Status: DISCONTINUED | OUTPATIENT
Start: 2025-01-12 | End: 2025-01-11 | Stop reason: HOSPADM

## 2025-01-11 RX ORDER — TORSEMIDE 20 MG/1
20 TABLET ORAL DAILY
Qty: 30 TABLET | Refills: 0 | Status: SHIPPED | OUTPATIENT
Start: 2025-01-12

## 2025-01-11 RX ORDER — POTASSIUM CHLORIDE 1500 MG/1
20 TABLET, EXTENDED RELEASE ORAL DAILY
Qty: 30 TABLET | Refills: 0 | Status: SHIPPED | OUTPATIENT
Start: 2025-01-12

## 2025-01-11 RX ADMIN — AMLODIPINE BESYLATE 10 MG: 10 TABLET ORAL at 08:36

## 2025-01-11 RX ADMIN — CLOPIDOGREL BISULFATE 75 MG: 75 TABLET ORAL at 08:37

## 2025-01-11 RX ADMIN — TORSEMIDE 40 MG: 20 TABLET ORAL at 08:37

## 2025-01-11 RX ADMIN — PANTOPRAZOLE SODIUM 40 MG: 40 TABLET, DELAYED RELEASE ORAL at 08:37

## 2025-01-11 RX ADMIN — POTASSIUM CHLORIDE 20 MEQ: 1500 TABLET, EXTENDED RELEASE ORAL at 08:36

## 2025-01-11 RX ADMIN — CALCITRIOL CAPSULES 0.25 MCG 0.25 MCG: 0.25 CAPSULE ORAL at 08:38

## 2025-01-11 RX ADMIN — FERROUS SULFATE TAB 325 MG (65 MG ELEMENTAL FE) 325 MG: 325 (65 FE) TAB at 08:36

## 2025-01-11 RX ADMIN — PANCRELIPASE 24000 UNITS: 120000; 24000; 76000 CAPSULE, DELAYED RELEASE PELLETS ORAL at 08:35

## 2025-01-11 RX ADMIN — INSULIN LISPRO 1 UNITS: 100 INJECTION, SOLUTION INTRAVENOUS; SUBCUTANEOUS at 12:12

## 2025-01-11 RX ADMIN — MAGNESIUM OXIDE TAB 400 MG (241.3 MG ELEMENTAL MG) 400 MG: 400 (241.3 MG) TAB at 08:37

## 2025-01-11 RX ADMIN — ALLOPURINOL 300 MG: 300 TABLET ORAL at 08:38

## 2025-01-11 RX ADMIN — HEPARIN SODIUM 5000 UNITS: 5000 INJECTION, SOLUTION INTRAVENOUS; SUBCUTANEOUS at 13:20

## 2025-01-11 RX ADMIN — PANCRELIPASE 24000 UNITS: 120000; 24000; 76000 CAPSULE, DELAYED RELEASE PELLETS ORAL at 12:12

## 2025-01-11 RX ADMIN — HEPARIN SODIUM 5000 UNITS: 5000 INJECTION, SOLUTION INTRAVENOUS; SUBCUTANEOUS at 05:21

## 2025-01-11 RX ADMIN — HYDRALAZINE HYDROCHLORIDE 75 MG: 25 TABLET ORAL at 08:36

## 2025-01-11 RX ADMIN — Medication 12.5 MG: at 08:36

## 2025-01-11 NOTE — DISCHARGE SUMMARY
Discharge Summary - Hospitalist   Name: Jay Roach Jr. 79 y.o. male I MRN: [id number]  Unit/Bed#: -01 I Date of Admission: 1/6/2025   Date of Service: 1/11/2025 I Hospital Day: 5     Assessment & Plan  Acute on chronic heart failure with preserved ejection fraction (HFpEF) ([facility])  Wt Readings from Last 3 Encounters:   01/11/25 72.5 kg (159 lb 13.3 oz)   01/03/25 83 kg (182 lb 14.3 oz)   12/24/24 80 kg (176 lb 5.9 oz)     Presents with worsening shortness of breath since discharge on 1/3/25 with lower extremity edema and abdominal distension  Hospitalized from 12/24/2024 to 1/3/2025 with mesenteric ischemia.  Underwent exploratory laparotomy, lysis of adhesions, mesenteric angiogram and retrograde open mesenteric stent placement on 12/24/24. During hospitalization had SHOAIB on CKD-4. Was discharge on Lasix 40 mg 3 times weekly on TTS  Echo (6/8/23) - EF 65%, grade 1 diastolic dysfunction  Repeat echo this admission stable     CXR - small pleural effusions  S/p IV diuresis, cardiology and nephrology following.  Transitioned to 40mg torsemide with slight increase in creatinine decreased today with plan to discharge on 20 mg torsemide daily   Pt will need bmp in  1 wk with results to Dr copeland nephrology   Monitor I/O, daily weights  Potassium and magnesium supplementation   CKD (chronic kidney disease) stage 4, GFR 15-29 ml/min ([facility])  Lab Results   Component Value Date    EGFR 23 01/11/2025    EGFR 26 01/10/2025    EGFR 27 01/09/2025    CREATININE 2.47 (H) 01/11/2025    CREATININE 2.24 (H) 01/10/2025    CREATININE 2.19 (H) 01/09/2025   Baseline creatinine 1.8 to 2.2  Had SHOAIB during recent admission  To be discharged on Torsemide 20 mg daily   Currently renal function is stable   Nephrology input appreciated  NSVT (nonsustained ventricular tachycardia) ([facility])  Noted run -- replace K and Mg  Monitor on tele   Coronary artery disease involving native coronary artery of native heart without angina  pectoris  S/p CABG  PCI to RCA in 2022 (pre-TAVR)   Ct Plavix, statin, BB  Primary hypertension  Ct home meds Hydralazine 50 mg BID-->increased to 75 mg BID, Metoprolol tartrate 12.5 mg BID, Amlodipine 10 mg daily  Monitor /43  Mesenteric artery stenosis/recent mesenteric ischemia  Recent admission as above - s/p retrograde open mesenteric stent placement on 12/24/24  Ct Plavix, statin  Type 2 diabetes mellitus with stage 4 chronic kidney disease, without long-term current use of insulin ([facility])  Lab Results   Component Value Date    HGBA1C 6.6 (H) 12/24/2024       Recent Labs     01/10/25  1615 01/10/25  2126 01/11/25  0734 01/11/25  1102   POCGLU 140 179* 120 153*       Blood Sugar Average: Last 72 hrs:  (P) [id number]  Home regimen: Glimepiride 1 mg daily, Empagliflozin 10 mg daily - hold while inpatient  Sliding scale insulin  Monitor blood sugars and adjust insulin accordingly  GERD (gastroesophageal reflux disease)  Ct PPI  S/P TAVR (transcatheter aortic valve replacement)  In 2022  F/u echo  Primary non-small cell carcinoma of lower lobe of left lung (HCC)  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 diagnosed in April 2024  Completed definitive SBRT on 5/30/2024  Follows with radiation oncology   History of GI bleed  H/o gastric ulcers s/p clipping  Ct PPI  PAD (peripheral artery disease) (HCC)  S/p bilateral lower extremity revascularization  Hypomagnesemia  Replacement ordered      Medical Problems       Resolved Problems  Date Reviewed: 1/11/2025   None       Discharging Physician / Practitioner: ZHANNA Barth  PCP: Simón Hadley MD  Admission Date:   Admission Orders (From admission, onward)       Ordered        01/06/25 1349  INPATIENT ADMISSION  Once                          Discharge Date: 01/11/25    Consultations During Hospital Stay:  Dr Lucas nephrology   Dr Kumar - cardiology     Procedures Performed:   Chest xray 1/6 : Small pleural effusions.   Echocardiogram 1/8:    Left Ventricle: Left ventricular cavity size is normal. Wall thickness is mildly increased. There is mild concentric hypertrophy. The left ventricular ejection fraction is 65%. Systolic function is normal. Wall motion is normal. Diastolic function is mildly abnormal, consistent with grade I (abnormal) relaxation.    Right Ventricle: Right ventricular cavity size is normal. Systolic function is normal.    Left Atrium: The atrium is moderately dilated.    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 There is trace regurgitation. There is no evidence of paravalvular regurgitation. The gradient recorded across the prosthetic aortic valve is within the expected range. The aortic valve mean gradient is 7 mmHg. The dimensionless velocity index is 0.51. The aortic valve area is 2.21 cm2.    Mitral Valve: There is mild thickening. There is moderate calcification with moderate chordal involvement. The leaflets exhibit normal mobility. There is mild annular calcification. There is mild regurgitation.  Covid/flu/ RSV on 1/6 negative     Significant Findings / Test Results:   See above     Incidental Findings:   None     Test Results Pending at Discharge (will require follow up):   None     Outpatient Tests Requested:  Call to schedule follow up with pcp in one week   Call to schedule follow up with cardiology within the next week   Call to schedule follow up with nephrology at soonest available appointment       Complications:  none     Reason for Admission: Shortness of breath, lower extremity swelling    Hospital Course:   Jay Roach  is a 79 y.o. male patient who originally presented to the hospital on 1/6/2025 due to shortness of breath, lower extremity swelling.  Patient has past medical history of chronic heart failure with preserved ejection fraction, CKD 4, type 2 diabetes, CAD, GERD, lung cancer status postradiation, peripheral artery disease, hyperlipidemia family  history GI bleed with gastric ulcers, hypertension now presenting with progressively worsening shortness of breath since he was recently discharged on 1/3/2025 following a hospitalization for mesenteric ischemia which she underwent exploratory laparotomy and mesenteric stent placement.  Over the last 2 days patient also noted abdominal distention and lower extremity swelling he denies any chest pain no cough no fever or chills.  Patient was noted to have a BNP of 849 chest x-ray revealing small pleural effusions and patient was initiated on IV Lasix 40 mg twice daily with a repeat echo ordered.  Patient was seen by nephrology bleeding patient's creatinine remained stable around 1.8 they agreed with Lasix plan at that time we will continue to monitor and evaluate electrolytes and volume.  Patient was seen by cardiology who felt patient appeared more euvolemic after having had some IV diuretics and recommended resumption of oral diuretics blood pressures were at goal.  Patient's creatinine noted to be mildly above baseline at 2.9-2.5.  Echocardiogram revealed an EF of 65% with abnormal diastolic function patient with known history of TAVR.  Patient received a one-time dose of 40 mg of torsemide with a slight increase in renal function therefore torsemide was reduced to 20 mg daily.  After discussion with cardiology it was determined the patient was stable for discharge but will need blood work in the next week and to follow-up with nephrology as an outpatient.  Patient should continue follow-up with cardiology.  Call to schedule follow-up with his PCP within the next week.  Extensive discussion was had with patient in regards to medications case management have consulted visiting nurses for nursing and physical therapy at his home.  Tensive discussion was had with the patient's son.            Please see above list of diagnoses and related plan for additional information.     Condition at Discharge: fair    Discharge  "Day Visit / Exam:   Subjective:  pt doing well eating lunch at this time. No sob feels swelling much better. No n/v/d no sob has walked 3 times to bathroom per RN , pt nervous to go shelly   Vitals: Blood Pressure: (!) 140/47 (01/11/25 1550)  Pulse: 69 (01/11/25 1550)  Temperature: (!) 97.2 °F (36.2 °C) (01/11/25 1550)  Temp Source: Oral (01/08/25 0755)  Respirations: 16 (01/11/25 1550)  Height: 5' 10\" (177.8 cm) (01/08/25 1047)  Weight - Scale: 72.5 kg (159 lb 13.3 oz) (01/11/25 0600)  SpO2: 95 % (01/11/25 1550)  Physical Exam  Constitutional:       General: He is not in acute distress.     Appearance: He is not ill-appearing, toxic-appearing or diaphoretic.   HENT:      Head: Normocephalic and atraumatic.      Nose: No congestion.   Eyes:      General:         Right eye: No discharge.         Left eye: No discharge.   Cardiovascular:      Rate and Rhythm: Normal rate.      Heart sounds: Murmur heard.      No friction rub. No gallop.   Pulmonary:      Effort: No respiratory distress.      Breath sounds: No stridor. No wheezing, rhonchi or rales.   Chest:      Chest wall: No tenderness.   Musculoskeletal:         General: No swelling, tenderness, deformity or signs of injury.      Right lower leg: No edema.      Left lower leg: No edema.   Skin:     Coloration: Skin is not jaundiced or pale.      Findings: No bruising, erythema, lesion or rash.   Neurological:      Mental Status: He is alert and oriented to person, place, and time.   Psychiatric:         Behavior: Behavior normal.          Discussion with Family: Updated  (son) via phone.    Discharge instructions/Information to patient and family:   See after visit summary for information provided to patient and family.      Provisions for Follow-Up Care:  See after visit summary for information related to follow-up care and any pertinent home health orders.      Mobility at time of Discharge:   Basic Mobility Inpatient Raw Score: 18  -[facility] Goal: 6: " Walk 10 steps or more  JH-HLM Achieved: 6: Walk 10 steps or more  HLM Goal achieved. Continue to encourage appropriate mobility.     Disposition:   Home with VNA Services (Reminder: Complete face to face encounter)    Planned Readmission: no plan for readmission     Discharge Medications:  See after visit summary for reconciled discharge medications provided to patient and/or family.      Administrative Statements   Discharge Statement:  I have spent a total time of 65 minutes in caring for this patient on the day of the visit/encounter. >30 minutes of time was spent on: Diagnostic results, Patient and family education, Importance of tx compliance, Risk factor reductions, Counseling / Coordination of care, Documenting in the medical record, Reviewing / ordering tests, medicine, procedures  , and Communicating with other healthcare professionals .    **Please Note: This note may have been constructed using a voice recognition system**

## 2025-01-11 NOTE — PROGRESS NOTES
Patient discharged home. Discharge instructions reviewed with patient who verbalized understanding. IV catheter, continuing heart monitoring and Masimo discontinued. Patient left the floor with his belongings in no apparent distress. Patient's vital signs stable at the time of discharge.

## 2025-01-11 NOTE — ASSESSMENT & PLAN NOTE
Lab Results   Component Value Date    EGFR 23 01/11/2025    EGFR 26 01/10/2025    EGFR 27 01/09/2025    CREATININE 2.47 (H) 01/11/2025    CREATININE 2.24 (H) 01/10/2025    CREATININE 2.19 (H) 01/09/2025   Baseline creatinine 1.8-2.2  Creatinine 2.1 from 1.9  Creatinine increased to 2.47 overnight

## 2025-01-11 NOTE — ASSESSMENT & PLAN NOTE
Volume status has significantly improved with decrease in weight and marked improvement in peripheral edema on exam  Volume status appears stable, adjust torsemide to 20 mg daily.

## 2025-01-11 NOTE — ASSESSMENT & PLAN NOTE
Lab Results   Component Value Date    HGBA1C 6.6 (H) 12/24/2024       Recent Labs     01/10/25  1615 01/10/25  2126 01/11/25  0734 01/11/25  1102   POCGLU 140 179* 120 153*       Blood Sugar Average: Last 72 hrs:  (P) [id number]  Home regimen: Glimepiride 1 mg daily, Empagliflozin 10 mg daily - hold while inpatient  Sliding scale insulin  Monitor blood sugars and adjust insulin accordingly

## 2025-01-11 NOTE — ASSESSMENT & PLAN NOTE
Lab Results   Component Value Date    EGFR 23 01/11/2025    EGFR 26 01/10/2025    EGFR 27 01/09/2025    CREATININE 2.47 (H) 01/11/2025    CREATININE 2.24 (H) 01/10/2025    CREATININE 2.19 (H) 01/09/2025   Baseline creatinine 1.8 to 2.2  Had SHOAIB during recent admission  Diuretics as above for now with slight increase overnight now on po diuretics   Currently renal function is stable   Nephrology input appreciated

## 2025-01-11 NOTE — CASE MANAGEMENT
Case Management Discharge Note    Patient name Jay Roach Jr.  Location /-01 MRN [id number]  : 1945 Date 2025       Current Admission Date: 2025  Current Admission Diagnosis:Acute on chronic heart failure with preserved ejection fraction (HFpEF) ([facility])      LOS (days): 5  Geometric Mean LOS (GMLOS) (days): 3  Days to GMLOS:-2     OBJECTIVE:  Risk of Unplanned Readmission Score: 42.37   Current admission status: Inpatient   Primary Insurance: MEDICARE  Secondary Insurance: COMMERCIAL MISCELLANEOUS    DISCHARGE DETAILS:  Discharge planning discussed with:: Patient  Freedom of Choice: Yes  CM contacted family/caregiver?: Yes  Were Treatment Team discharge recommendations reviewed with patient/caregiver?: Yes  Did patient/caregiver verbalize understanding of patient care needs?: Yes  Were patient/caregiver advised of the risks associated with not following Treatment Team discharge recommendations?: Yes    Contacts  Patient Contacts: Dom Roach (pt's son)  Relationship to Patient:: Family  Contact Method: Phone  Phone Number: [phone]  Reason/Outcome: Emergency Contact    Requested Home Health Care         Is the patient interested in HHC at discharge?: Yes  Home Health Discipline requested:: Nursing, Physical Therapy  Home Health Agency Name:: [facility]  Home Health Follow-Up Provider:: PCP  Home Health Services Needed:: Evaluate Functional Status and Safety, Gait/ADL Training, Strengthening/Theraputic Exercises to Improve Function, Heart Failure Management  Homebound Criteria Met:: Requires the Assistance of Another Person for Safe Ambulation or to Leave the Home  Supporting Clincal Findings:: Limited Endurance    Treatment Team Recommendation: Home with Home Health Care  Discharge Destination Plan:: Home with Home Health Care  Transport at Discharge : Family    Additional Comments: Pt is cleared for d/c by NICOLLE Lozada. Pt is accepted for resumption of care  by JONATHAN for his aftercare plan. The pt and his son Dom Roach were both informed of d/c. Son will transport pt home later this day; pickup time is TBD. IMM was signed by pt on 1/10/25. No further CM needs.

## 2025-01-11 NOTE — PROGRESS NOTES
Cardiology Progress Note - Jay Roach Jr. 79 y.o. male MRN: [id number]    Unit/Bed#: -01 Encounter: [id number]        Assessment & Plan  Acute on chronic heart failure with preserved ejection fraction (HFpEF) ([facility])  Patient was discharged from the hospital 1/3.  He reports the start of shortness of breath shortly thereafter.  He said he was experiencing swelling legs when he was discharged.   Previous diuretic dose torsemide 10 mg daily.  He was discharged on Lasix 40 mg 3 times a week directed by nephrology.  Had not taken that dose yet, admitted within several days of discharge. Jardiance on hold   Chest x-ray small bilateral pleural effusions  .  No recent BNP for comparison.  Presents with negative troponins negative.   TTE 6/2023 LVEF 65%.  Grade 1 diastolic dysfunction.  No significant valvular heart disease.  RV size and function normal.    Initially on IV Lasix 40 mg twice daily   Nephrology started on torsemide.  He did have a good diuretic response overnight, however his creatinine has continued to rise  We will await nephrology recommendations regarding home dosing of p.o. diuretic -appears to be 20 mg dosing starting tomorrow, received 40 mg yesterday  Coronary artery disease involving native coronary artery of native heart without angina pectoris  History of CABG.  Coronary angiogram 5/2022 patent LIMA to LAD and patent SVG to OM.  Graft to proximal RCA required BURAK  On aspirin and Plavix, beta-blocker and statin.  Troponins negative  ECG sinus rhythm first-degree block PVC  No active chest pain  Type 2 diabetes mellitus with stage 4 chronic kidney disease, without long-term current use of insulin ([facility])  Lab Results   Component Value Date    HGBA1C 6.6 (H) 12/24/2024   Management as per primary team  CKD (chronic kidney disease) stage 4, GFR 15-29 ml/min ([facility])  Lab Results   Component Value Date    EGFR 23 01/11/2025    EGFR 26 01/10/2025    EGFR 27 01/09/2025    CREATININE 2.47 (H)  01/11/2025    CREATININE 2.24 (H) 01/10/2025    CREATININE 2.19 (H) 01/09/2025   Baseline creatinine 1.8-2.2  Creatinine 2.1 from 1.9  Creatinine increased to 2.47 overnight  S/P TAVR (transcatheter aortic valve replacement)  6/2022 TAVR  TTE 6/2023-Smith KYLER 3 ultra 29 TAVR bioprosthetic valve well-seated and appears function normal.  Trace paravalvular leak.  No evidence of transvalvular regurgitation.  Current TTE essentially unchanged  Continue medical management  History of GI bleed  H&H stable.  7.8/24.2  Primary non-small cell carcinoma of lower lobe of left lung (HCC)  Management as per oncology team in the outpatient setting  Hypertensive kidney disease with stage 4 chronic kidney disease ([facility])  Blood pressures had been consistently elevated.  Increased hydralazine to 75 mg twice daily   On amlodipine 10 mg daily, Lopressor 12.5 mg twice a day, hydralazine increased as mentioned  Improving control with current regiment  Mesenteric artery stenosis/recent mesenteric ischemia  Status post exploratory lap 12/24, ADA, mesenteric agram , retrograde open mesenteric stent  NSVT (nonsustained ventricular tachycardia) ([facility])  Up to 18 beat  NSVT in the setting of low potassium and magnesium  No NSVT in over 24 hours  Mg now repleted to 2  Primary hypertension  was persistently hypertensive  Currently on amlodipine 10 mg daily, hydralazine 50 mg twice daily, Lopressor 12.5 mg twice daily.  Additionally on IV Lasix.  Increased hydralazine 75mg BID, with adequate response.   GERD (gastroesophageal reflux disease)  Continued on Protonix  Hypomagnesemia  Now repleted  Ischemic cardiomyopathy  Ejection fraction normal on most recent echocardiogram  Volume overload  In the setting of acute on chronic diastolic heart failure  PAD (peripheral artery disease) ([facility])  Medical management with aspirin, statin, Plavix, and beta-blocker    Subjective:   Patient seen and examined.  No significant events overnight.  ; pertinent  "negatives - chest pain, dyspnea, irregular heart beat, and palpitations.    Objective:     Vitals: Blood pressure 152/52, pulse 62, temperature 97.8 °F (36.6 °C), resp. rate 19, height 5' 10\" (1.778 m), weight 72.5 kg (159 lb 13.3 oz), SpO2 92%., Body mass index is 22.93 kg/m².,   Orthostatic Blood Pressures      Flowsheet Row Most Recent Value   Blood Pressure 152/52 filed at 01/11/2025 0732   Patient Position - Orthostatic VS Sitting filed at 01/08/2025 0755              Intake/Output Summary (Last 24 hours) at 1/11/2025 0850  Last data filed at 1/11/2025 0841  Gross per 24 hour   Intake 770 ml   Output 1320 ml   Net -550 ml       TELE: Occasional Mobitz 1 heart block    Physical Exam:    GEN: Jay Roach Jr. appears well, alert and oriented x 3, pleasant and cooperative   HEENT: pupils equal, round, and reactive to light; extraocular muscles intact  NECK: supple, no carotid bruits   HEART: regular rhythm, normal S1 and S2, +systolic murmur, no clicks, gallops or rubs   LUNGS: clear to auscultation bilaterally; no wheezes, rales, or rhonchi   ABDOMEN: normal bowel sounds, soft, no tenderness, no distention  EXTREMITIES: peripheral pulses normal; no clubbing, cyanosis, +tr edema  NEURO: no focal findings   SKIN: normal without suspicious lesions on exposed skin    Medications:      Current Facility-Administered Medications:     acetaminophen (TYLENOL) tablet 650 mg, 650 mg, Oral, Q4H PRN, Karen Vallejo MD    allopurinol (ZYLOPRIM) tablet 300 mg, 300 mg, Oral, Daily, Karen Vallejo MD, 300 mg at 01/11/25 0838    amLODIPine (NORVASC) tablet 10 mg, 10 mg, Oral, Daily, Karen Vallejo MD, 10 mg at 01/11/25 0836    atorvastatin (LIPITOR) tablet 80 mg, 80 mg, Oral, HS, Karen Vallejo MD, 80 mg at 01/10/25 2121    calcitriol (ROCALTROL) capsule 0.25 mcg, 0.25 mcg, Oral, Daily, Karen Vallejo MD, 0.25 mcg at 01/11/25 0838    clopidogrel (PLAVIX) tablet 75 mg, 75 mg, Oral, Daily, Karen Vallejo MD, " 75 mg at 01/11/25 0837    ferrous sulfate tablet 325 mg, 325 mg, Oral, Every Other Day, Karen Vallejo MD, 325 mg at 01/11/25 0836    heparin (porcine) subcutaneous injection 5,000 Units, 5,000 Units, Subcutaneous, Q8H [facility], Karen Vallejo MD, 5,000 Units at 01/11/25 0521    hydrALAZINE (APRESOLINE) tablet 75 mg, 75 mg, Oral, BID, ZHANNA Alvarez, 75 mg at 01/11/25 0836    insulin lispro (HumALOG/ADMELOG) 100 units/mL subcutaneous injection 1-5 Units, 1-5 Units, Subcutaneous, TID AC **AND** Fingerstick Glucose (POCT), , , TID AC, Karen Vallejo MD    magnesium Oxide (MAG-OX) tablet 400 mg, 400 mg, Oral, BID, Harpal Calhoun MD, 400 mg at 01/11/25 0837    metoprolol tartrate (LOPRESSOR) partial tablet 12.5 mg, 12.5 mg, Oral, Q12H [facility], Karen Vallejo MD, 12.5 mg at 01/11/25 0836    pancrelipase (Lip-Prot-Amyl) (CREON) delayed release capsule 24,000 Units, 24,000 Units, Oral, TID With Meals, Karen Vallejo MD, 24,000 Units at 01/11/25 0835    pantoprazole (PROTONIX) EC tablet 40 mg, 40 mg, Oral, BID, Karen Vallejo MD, 40 mg at 01/11/25 0837    potassium chloride (Klor-Con M20) CR tablet 20 mEq, 20 mEq, Oral, BID, ZHANNA Alvarez, 20 mEq at 01/11/25 0836    [START ON 1/12/2025] torsemide (DEMADEX) tablet 20 mg, 20 mg, Oral, Daily, Holland Lucas,      Labs & Results:        Results from last 7 days   Lab Units 01/11/25  0539 01/07/25  0626 01/06/25  1205   WBC Thousand/uL 5.78 4.23* 4.60   HEMOGLOBIN g/dL 7.9* 7.8* 7.5*   HEMATOCRIT % 24.3* 24.2* 23.5*   PLATELETS Thousands/uL 189 217 220         Results from last 7 days   Lab Units 01/11/25  0539 01/10/25  0605 01/09/25  0529 01/07/25  0626 01/06/25  1205   POTASSIUM mmol/L 4.2 4.3 4.0   < > 3.6   CHLORIDE mmol/L 103 104 106   < > 112*   CO2 mmol/L 29 28 28   < > 25   BUN mg/dL 38* 29* 28*   < > 26*   CREATININE mg/dL 2.47* 2.24* 2.19*   < > 1.81*   CALCIUM mg/dL 8.4 8.1* 7.9*   < > 7.9*   ALK PHOS U/L  --   --   --    --  68   ALT U/L  --   --   --   --  20   AST U/L  --   --   --   --  19    < > = values in this interval not displayed.     Results from last 7 days   Lab Units 01/06/25  1205   INR  1.01   PTT seconds 28     Results from last 7 days   Lab Units 01/10/25  0605 01/09/25  0529 01/07/25  0626   MAGNESIUM mg/dL 2.0 1.6* 1.8*       Echo: personally reviewed - normal LV size, mild LVH, normal EF. Grade 1 diastolic dysfunction. Left atrial enlargement. TAVR bioprosthesis with mean gradient of 7 mmHg.     EKG personally reviewed by Pau Lima MD.

## 2025-01-11 NOTE — ASSESSMENT & PLAN NOTE
Lab Results   Component Value Date    EGFR 23 01/11/2025    EGFR 26 01/10/2025    EGFR 27 01/09/2025    CREATININE 2.47 (H) 01/11/2025    CREATININE 2.24 (H) 01/10/2025    CREATININE 2.19 (H) 01/09/2025   Baseline creatinine 1.8 to 2.2  Had SHOAIB during recent admission  To be discharged on Torsemide 20 mg daily   Currently renal function is stable   Nephrology input appreciated

## 2025-01-11 NOTE — PLAN OF CARE
Problem: PAIN - ADULT  Goal: Verbalizes/displays adequate comfort level or baseline comfort level  Description: Interventions:  - Encourage patient to monitor pain and request assistance  - Assess pain using appropriate pain scale  - Administer analgesics based on type and severity of pain and evaluate response  - Implement non-pharmacological measures as appropriate and evaluate response  - Consider cultural and social influences on pain and pain management  - Notify physician/advanced practitioner if interventions unsuccessful or patient reports new pain  Outcome: Progressing     Problem: INFECTION - ADULT  Goal: Absence or prevention of progression during hospitalization  Description: INTERVENTIONS:  - Assess and monitor for signs and symptoms of infection  - Monitor lab/diagnostic results  - Monitor all insertion sites, i.e. indwelling lines, tubes, and drains  - Monitor endotracheal if appropriate and nasal secretions for changes in amount and color  - [place] appropriate cooling/warming therapies per order  - Administer medications as ordered  - Instruct and encourage patient and family to use good hand hygiene technique  - Identify and instruct in appropriate isolation precautions for identified infection/condition  Outcome: Progressing  Goal: Absence of fever/infection during neutropenic period  Description: INTERVENTIONS:  - Monitor WBC    Outcome: Progressing     Problem: SAFETY ADULT  Goal: Patient will remain free of falls  Description: INTERVENTIONS:  - Educate patient/family on patient safety including physical limitations  - Instruct patient to call for assistance with activity   - Consult OT/PT to assist with strengthening/mobility   - Keep Call bell within reach  - Keep bed low and locked with side rails adjusted as appropriate  - Keep care items and personal belongings within reach  - Initiate and maintain comfort rounds  - Make Fall Risk Sign visible to staff  - Offer Toileting every 2 Hours,  in advance of need  - Initiate/Maintain bed/chair alarm  - Obtain necessary fall risk management equipment: slippers, walker  - Apply yellow socks and bracelet for high fall risk patients  - Consider moving patient to room near nurses station  Outcome: Progressing     Problem: DISCHARGE PLANNING  Goal: Discharge to home or other facility with appropriate resources  Description: INTERVENTIONS:  - Identify barriers to discharge w/patient and caregiver  - Arrange for needed discharge resources and transportation as appropriate  - Identify discharge learning needs (meds, wound care, etc.)  - Arrange for interpretive services to assist at discharge as needed  - Refer to Case Management Department for coordinating discharge planning if the patient needs post-hospital services based on physician/advanced practitioner order or complex needs related to functional status, cognitive ability, or social support system  Outcome: Progressing     Problem: Knowledge Deficit  Goal: Patient/family/caregiver demonstrates understanding of disease process, treatment plan, medications, and discharge instructions  Description: Complete learning assessment and assess knowledge base.  Interventions:  - Provide teaching at level of understanding  - Provide teaching via preferred learning methods  Outcome: Progressing

## 2025-01-11 NOTE — ASSESSMENT & PLAN NOTE
Wt Readings from Last 3 Encounters:   01/11/25 72.5 kg (159 lb 13.3 oz)   01/03/25 83 kg (182 lb 14.3 oz)   12/24/24 80 kg (176 lb 5.9 oz)     Presents with worsening shortness of breath since discharge on 1/3/25 with lower extremity edema and abdominal distension  Hospitalized from 12/24/2024 to 1/3/2025 with mesenteric ischemia.  Underwent exploratory laparotomy, lysis of adhesions, mesenteric angiogram and retrograde open mesenteric stent placement on 12/24/24. During hospitalization had SHOAIB on CKD-4. Was discharge on Lasix 40 mg 3 times weekly on TTS  Echo (6/8/23) - EF 65%, grade 1 diastolic dysfunction  Repeat echo this admission stable     CXR - small pleural effusions  S/p IV diuresis, cardiology and nephrology following.  Transition to PO torsemide 1/9 with slight increase in creatinine  Pt with very frequent low volume voids asked nursing to please bladder scan (83ml now )   Pt with less than 10 cc feeling the urge (PVR 83ml)  Will obtain cbc and bmp in am   Monitor I/O, daily weights  Potassium and magnesium supplementation

## 2025-01-11 NOTE — ASSESSMENT & PLAN NOTE
Patient was discharged from the hospital 1/3.  He reports the start of shortness of breath shortly thereafter.  He said he was experiencing swelling legs when he was discharged.   Previous diuretic dose torsemide 10 mg daily.  He was discharged on Lasix 40 mg 3 times a week directed by nephrology.  Had not taken that dose yet, admitted within several days of discharge. Jardiance on hold   Chest x-ray small bilateral pleural effusions  .  No recent BNP for comparison.  Presents with negative troponins negative.   TTE 6/2023 LVEF 65%.  Grade 1 diastolic dysfunction.  No significant valvular heart disease.  RV size and function normal.    Initially on IV Lasix 40 mg twice daily   Nephrology started on torsemide.  He did have a good diuretic response overnight, however his creatinine has continued to rise  We will await nephrology recommendations regarding home dosing of p.o. diuretic -appears to be 20 mg dosing starting tomorrow, received 40 mg yesterday

## 2025-01-11 NOTE — ASSESSMENT & PLAN NOTE
Chronic kidney disease with previous baseline creatinine reported at 1.8-2.2.  Recent creatinine 2.47.  May need to tolerate higher creatinine based upon volume status.  Suspect baseline now 2.0-2.5.

## 2025-01-11 NOTE — PROGRESS NOTES
NEPHROLOGY HOSPITAL PROGRESS NOTE   Jay Roach Jr. 79 y.o. male MRN: [id number]  Unit/Bed#: -01 Encounter: [id number]  Reason for Consult: CKD    Assessment & Plan  CKD (chronic kidney disease) stage 4, GFR 15-29 ml/min (HCC)  Chronic kidney disease with previous baseline creatinine reported at 1.8-2.2.  Recent creatinine 2.47.  May need to tolerate higher creatinine based upon volume status.  Suspect baseline now 2.0-2.5.  Ischemic cardiomyopathy  Recent echocardiogram showing ejection fraction of 65% with abnormal diastolic function.  Known history of TAVR.  No significant regurgitation.  Volume overload  Volume status has significantly improved with decrease in weight and marked improvement in peripheral edema on exam  Volume status appears stable, adjust torsemide to 20 mg daily.  Hypertensive kidney disease with stage 4 chronic kidney disease (HCC)  Blood pressure currently appears to be stable but somewhat elevated.  Overall, blood pressure improving with diuresis.    Currently acceptable, no changes in his current regimen.  Mesenteric artery stenosis/recent mesenteric ischemia  Recent admission for abdominal pain with exploratory laparotomy with retrograde open SMA stent placement.  Management as per surgery.  Hypomagnesemia  Continue oral replacement but increase dose    Summary:  Overall renal function luis alberto quite stable to creatinine 2.47  Adjust torsemide to 20 mg daily  Will continue to hold Jardiance  Reduce potassium supplementation 20 mill equivalents daily  Okay for discharge from nephrology standpoint  Recommend repeat BMP next week.    SUBJECTIVE / 24H INTERVAL HISTORY:  Seen and examined.  Patient offers no new complaints.  Denies any chest pain, shortness of breath.  No significant lower extremity swelling.  His appetite is stable.    OBJECTIVE:  Current Weight: Weight - Scale: 72.5 kg (159 lb 13.3 oz)  Vitals:    01/11/25 0336 01/11/25 0600 01/11/25 0732 01/11/25 1123   BP: (!)  134/48  152/52 (!) 128/43   Pulse: 63  62 55   Resp: 19      Temp: 98.2 °F (36.8 °C)  97.8 °F (36.6 °C)    TempSrc:       SpO2: 92%  92% 95%   Weight:  72.5 kg (159 lb 13.3 oz)     Height:           Intake/Output Summary (Last 24 hours) at 1/11/2025 1213  Last data filed at 1/11/2025 1123  Gross per 24 hour   Intake 770 ml   Output 1720 ml   Net -950 ml       Physical Exam  Constitutional:       Appearance: He is not ill-appearing.   Cardiovascular:      Rate and Rhythm: Normal rate and regular rhythm.   Pulmonary:      Effort: Pulmonary effort is normal.      Breath sounds: Normal breath sounds.   Abdominal:      General: There is no distension.      Palpations: Abdomen is soft.      Tenderness: There is no abdominal tenderness.   Musculoskeletal:      Right lower leg: No edema.      Left lower leg: No edema.   Skin:     General: Skin is warm and dry.      Findings: No rash.   Neurological:      Mental Status: He is alert and oriented to person, place, and time.         Medications:    Current Facility-Administered Medications:     acetaminophen (TYLENOL) tablet 650 mg, 650 mg, Oral, Q4H PRN, Karen Vallejo MD    allopurinol (ZYLOPRIM) tablet 300 mg, 300 mg, Oral, Daily, Karen Vallejo MD, 300 mg at 01/11/25 0838    amLODIPine (NORVASC) tablet 10 mg, 10 mg, Oral, Daily, Karen Vallejo MD, 10 mg at 01/11/25 0836    atorvastatin (LIPITOR) tablet 80 mg, 80 mg, Oral, HS, Karen Vallejo MD, 80 mg at 01/10/25 2121    calcitriol (ROCALTROL) capsule 0.25 mcg, 0.25 mcg, Oral, Daily, Karen Vallejo MD, 0.25 mcg at 01/11/25 0838    clopidogrel (PLAVIX) tablet 75 mg, 75 mg, Oral, Daily, Karen Vallejo MD, 75 mg at 01/11/25 0837    ferrous sulfate tablet 325 mg, 325 mg, Oral, Every Other Day, Karen Vallejo MD, 325 mg at 01/11/25 0836    heparin (porcine) subcutaneous injection 5,000 Units, 5,000 Units, Subcutaneous, Q8H [facility], Karen Vallejo MD, 5,000 Units at 01/11/25 0521    hydrALAZINE (APRESOLINE)  "tablet 75 mg, 75 mg, Oral, BID, ZHANNA Alvarez, 75 mg at 01/11/25 0836    insulin lispro (HumALOG/ADMELOG) 100 units/mL subcutaneous injection 1-5 Units, 1-5 Units, Subcutaneous, TID AC, 1 Units at 01/11/25 1212 **AND** Fingerstick Glucose (POCT), , , TID AC, Karen Vallejo MD    magnesium Oxide (MAG-OX) tablet 400 mg, 400 mg, Oral, BID, Harpal Calhoun MD, 400 mg at 01/11/25 0837    metoprolol tartrate (LOPRESSOR) partial tablet 12.5 mg, 12.5 mg, Oral, Q12H [facility], Karen Vallejo MD, 12.5 mg at 01/11/25 0836    pancrelipase (Lip-Prot-Amyl) (CREON) delayed release capsule 24,000 Units, 24,000 Units, Oral, TID With Meals, Karen Vallejo MD, 24,000 Units at 01/11/25 1212    pantoprazole (PROTONIX) EC tablet 40 mg, 40 mg, Oral, BID, Karen Vallejo MD, 40 mg at 01/11/25 0837    potassium chloride (Klor-Con M20) CR tablet 20 mEq, 20 mEq, Oral, BID, ZHANNA Alvarez, 20 mEq at 01/11/25 0836    [START ON 1/12/2025] torsemide (DEMADEX) tablet 20 mg, 20 mg, Oral, Daily, Holland Lucas, DO    Laboratory Results:  Results from last 7 days   Lab Units 01/11/25  0539 01/10/25  0605 01/09/25  0529 01/08/25  0510 01/07/25  0626 01/06/25  1205   WBC Thousand/uL 5.78  --   --   --  4.23* 4.60   HEMOGLOBIN g/dL 7.9*  --   --   --  7.8* 7.5*   HEMATOCRIT % 24.3*  --   --   --  24.2* 23.5*   PLATELETS Thousands/uL 189  --   --   --  217 220   POTASSIUM mmol/L 4.2 4.3 4.0 3.9 3.4* 3.6   CHLORIDE mmol/L 103 104 106 106 110* 112*   CO2 mmol/L 29 28 28 27 27 25   BUN mg/dL 38* 29* 28* 28* 25 26*   CREATININE mg/dL 2.47* 2.24* 2.19* 1.93* 1.80* 1.81*   CALCIUM mg/dL 8.4 8.1* 7.9* 8.2* 7.9* 7.9*   MAGNESIUM mg/dL  --  2.0 1.6*  --  1.8* 1.6*       Portions of the record may have been created with voice recognition software. Occasional wrong word or \"sound a like\" substitutions may have occurred due to the inherent limitations of voice recognition software. Read the chart carefully and recognize, using " context, where substitutions have occurred.If you have any questions, please contact the dictating provider.

## 2025-01-11 NOTE — PLAN OF CARE
Patient has no change in assessment.          Problem: PAIN - ADULT  Goal: Verbalizes/displays adequate comfort level or baseline comfort level  Description: Interventions:  - Encourage patient to monitor pain and request assistance  - Assess pain using appropriate pain scale  - Administer analgesics based on type and severity of pain and evaluate response  - Implement non-pharmacological measures as appropriate and evaluate response  - Consider cultural and social influences on pain and pain management  - Notify physician/advanced practitioner if interventions unsuccessful or patient reports new pain  Outcome: Progressing     Problem: DISCHARGE PLANNING  Goal: Discharge to home or other facility with appropriate resources  Description: INTERVENTIONS:  - Identify barriers to discharge w/patient and caregiver  - Arrange for needed discharge resources and transportation as appropriate  - Identify discharge learning needs (meds, wound care, etc.)  - Arrange for interpretive services to assist at discharge as needed  - Refer to Case Management Department for coordinating discharge planning if the patient needs post-hospital services based on physician/advanced practitioner order or complex needs related to functional status, cognitive ability, or social support system  Outcome: Progressing

## 2025-01-11 NOTE — ASSESSMENT & PLAN NOTE
Lab Results   Component Value Date    HGBA1C 6.6 (H) 12/24/2024       Recent Labs     01/10/25  1615 01/10/25  2126 01/11/25  0734 01/11/25  1102   POCGLU 140 179* 120 153*       Blood Sugar Average: Last 72 hrs:  (P) [id number]  Home regimen: Glimepiride 1 mg daily, Empagliflozin 10 mg daily - hold while inpatient  Sliding scale insulin  Monitor blood sugars and adjust insulin accordingly

## 2025-01-11 NOTE — ASSESSMENT & PLAN NOTE
Wt Readings from Last 3 Encounters:   01/11/25 72.5 kg (159 lb 13.3 oz)   01/03/25 83 kg (182 lb 14.3 oz)   12/24/24 80 kg (176 lb 5.9 oz)     Presents with worsening shortness of breath since discharge on 1/3/25 with lower extremity edema and abdominal distension  Hospitalized from 12/24/2024 to 1/3/2025 with mesenteric ischemia.  Underwent exploratory laparotomy, lysis of adhesions, mesenteric angiogram and retrograde open mesenteric stent placement on 12/24/24. During hospitalization had SHOAIB on CKD-4. Was discharge on Lasix 40 mg 3 times weekly on TTS  Echo (6/8/23) - EF 65%, grade 1 diastolic dysfunction  Repeat echo this admission stable     CXR - small pleural effusions  S/p IV diuresis, cardiology and nephrology following.  Transitioned to 40mg torsemide with slight increase in creatinine decreased today with plan to discharge on 20 mg torsemide daily   Pt will need bmp in  1 wk with results to Dr copeland nephrology   Monitor I/O, daily weights  Potassium and magnesium supplementation

## 2025-01-12 ENCOUNTER — HOME CARE VISIT (OUTPATIENT)
Dept: HOME HEALTH SERVICES | Facility: HOME HEALTHCARE | Age: 80
End: 2025-01-12
Payer: MEDICARE

## 2025-01-12 VITALS
OXYGEN SATURATION: 97 % | TEMPERATURE: 98 F | SYSTOLIC BLOOD PRESSURE: 120 MMHG | RESPIRATION RATE: 20 BRPM | HEART RATE: 78 BPM | DIASTOLIC BLOOD PRESSURE: 80 MMHG

## 2025-01-12 PROCEDURE — G0180 MD CERTIFICATION HHA PATIENT: HCPCS | Performed by: SURGERY

## 2025-01-12 PROCEDURE — G0299 HHS/HOSPICE OF RN EA 15 MIN: HCPCS

## 2025-01-12 NOTE — CASE COMMUNICATION
Ship to Pt. Home      Ordering MD Abdalla    Wound 1 abd Full QD  Wound 2 buttocks full QD    All items are ordered by the each unless otherwise noted.  Orders should be for a 2 week period (unless noted by insurance)         [place] [id number]  Gauze 4x4 N/S 200 4x4s per unit  [id number] 1

## 2025-01-12 NOTE — CASE COMMUNICATION
Medication discrepancies or Major drug interactionsna  Abnormal clinical findings na  This report is informational only, no response is needed  [facility] has Resumed your patient to Home Health service with the following disciplines: SN, PT, OT and MSW  Patient stated goals of care gain strength  Potential barriers to goal achievement  Primary focus of home health care:Cardiac Circulatory  Anticipated visit pattern and next visit d ate 1.14.25 3xwx1w 2xwx2w  Thank you for allowing us to participate in the care of your patient.

## 2025-01-13 ENCOUNTER — TRANSITIONAL CARE MANAGEMENT (OUTPATIENT)
Dept: FAMILY MEDICINE CLINIC | Facility: CLINIC | Age: 80
End: 2025-01-13

## 2025-01-13 ENCOUNTER — PATIENT OUTREACH (OUTPATIENT)
Dept: CASE MANAGEMENT | Facility: OTHER | Age: 80
End: 2025-01-13

## 2025-01-13 ENCOUNTER — TELEPHONE (OUTPATIENT)
Dept: NEPHROLOGY | Facility: CLINIC | Age: 80
End: 2025-01-13

## 2025-01-13 DIAGNOSIS — Z71.89 COMPLEX CARE COORDINATION: Primary | ICD-10-CM

## 2025-01-13 DIAGNOSIS — N18.4 CKD (CHRONIC KIDNEY DISEASE) STAGE 4, GFR 15-29 ML/MIN (HCC): Primary | ICD-10-CM

## 2025-01-13 PROCEDURE — 10330064 CLEANSER, WND SEA-CLEANS 6OZ  COLPLT

## 2025-01-13 PROCEDURE — 10330064 SPONGE, GAUZE 8PLY N/S 4X4" (200/PK 20P"

## 2025-01-13 NOTE — TELEPHONE ENCOUNTER
----- Message from Holland Lucas DO sent at 1/12/2025  7:47 AM EST -----  Recommend repeat BMP next week.  Will need follow-up appointment scheduled within 4 to 8 weeks.  Thank you.

## 2025-01-13 NOTE — PROGRESS NOTES
Outpatient Care Management Note:    Discharge ADT alert received. Patient was hospitalized from 1/6-1/11/25 with acute on chronic heart failure. He was treated with IV diuretics, converted to torsemide 40 mg daily, his creatinine increased and torsemide dose was decreased to 20 mg daily. He is to complete a BMP in 1 week. He will be followed by [facility] and is to follow up with his PCP, nephrology and cardiology.     CM called Jay and introduced myself. He is overwhelmed with his care needs. He states that he needs to schedule several follow up appts. He does not feel safe to drive. His son works. He does have a brother that can assist.  I recommended he sit with his brother and schedule follow up appts when it is convenient with brother. Jay states that he has the AVS with all contact numbers.  We did discuss lanta van and [employer] as options.     Jay feels he needs caregivers.He is afraid to shower, because he feels weak. He thinks he may have long term care insurance. He will find the policy and review it. He is also a  and may be eligible for caregivers through [facility].  Jay agreed to outreach by our  team.     Jay is being followed by [facility].  Jay declined completing a med review stating that the [facility] nurse reviewed everything. He is on torsemide and states he has it.    Jay states that the [facility] mentioned getting him a bath aid. CM will attempt to follow up.     Jay was in with CHF.     Following low sodium diet:  We reviewed keeping his salt intake <2000 mg per day. We reviewed foods high in salt and how to measure intake. Jay feels he needs help with meals and is interested in meals on wheels.  CM will forward to  team.  CORINE suggested I speak with his son, Dom, to review salt restrictions.  Jay states he is working. He will consider it in future.   Following fluid restriction:  Jay is to limit his fluid intake to <1800cc/day or 60 ounces. We reviewed her  to measure intake.   Hospital discharge weight:  159 lbs.   Weighing daily:  no, encouraged to start. He feels he is safe to get on the scale.           Weight log: no, encouraged to start.   1st home weight: He was weighed yesterday with JERARDO AN=882 lbs   Weight today: not done  Monitoring symptoms: We discussed monitoring for increased shortness of breath, swelling, inability to lay flat and decreased exercise tolerance.   Any current symptoms: Feels weak. Denies any increased swelling or shortness of breath. Sleeps flat in bed.   When to call provider: CM reviewed that he should call cardiology if he gains 3 lb in 1 day or 5 lb in 1 week.  Or if he experiences any of above symptoms.   Medications reviewed:  declined  Knows name of diuretic:  Yes  Escalation plan: Reviewed when to seek emergency care.    HF education reviewed/reinforced including low sodium diet, fluid restriction, activity, symptoms of decompensation and when and who to call.   Cardiology follow up appointment: not scheduled  PCP follow up appointment: not scheduled  Transportation: Drives but feels to weak to safely drive now.  Will work with brother for transportation assistance.  Referral placed to  team.   Home health care agency: [facility]  Start of Care Date: 1/12/25    Jay did not have a pen and paper to take my contact information. I called back and left the contact information on his voice mail. He knows that my phone goes through my computer and I do not get messages after hours or on weekends. He is aware to call his PCP office directly with any immediate questions.       Secure chat message sent to JERARDO Rodriguez RN to see if they are working on bath aid and verify that she will be drawing patients BMP in 1 week.     InPOS on [employer] message received from JERARDO Powers. She will look into it.

## 2025-01-14 ENCOUNTER — HOME CARE VISIT (OUTPATIENT)
Dept: HOME HEALTH SERVICES | Facility: HOME HEALTHCARE | Age: 80
End: 2025-01-14
Payer: MEDICARE

## 2025-01-14 VITALS
OXYGEN SATURATION: 95 % | BODY MASS INDEX: 22.5 KG/M2 | TEMPERATURE: 98.2 F | HEART RATE: 62 BPM | SYSTOLIC BLOOD PRESSURE: 126 MMHG | DIASTOLIC BLOOD PRESSURE: 80 MMHG | WEIGHT: 156.8 LBS

## 2025-01-14 PROCEDURE — G0299 HHS/HOSPICE OF RN EA 15 MIN: HCPCS

## 2025-01-14 NOTE — HOME HEALTH
Patient indicated to SN that he doesn't have anyone to get food for him and he only has two meals left. Patient also indicated he needs someone to help him with doing laundry, cleaning, grocery shop, and  medications for him.  Patient indicated he would like a call from  to discuss costs and options.  Message sent to .     Priya Padron RN

## 2025-01-14 NOTE — Clinical Note
Patient tripped last night (1/13/25) while ambulating,  Caught self on wall.  No injuries noted. Vital Signs WNL.  Patient refusing to be sent out to hospital for evaluation,      Priya Padron RN

## 2025-01-15 ENCOUNTER — PATIENT OUTREACH (OUTPATIENT)
Dept: CASE MANAGEMENT | Facility: OTHER | Age: 80
End: 2025-01-15

## 2025-01-15 ENCOUNTER — OFFICE VISIT (OUTPATIENT)
Dept: CARDIOLOGY CLINIC | Facility: MEDICAL CENTER | Age: 80
End: 2025-01-15
Payer: MEDICARE

## 2025-01-15 ENCOUNTER — HOME CARE VISIT (OUTPATIENT)
Dept: HOME HEALTH SERVICES | Facility: HOME HEALTHCARE | Age: 80
End: 2025-01-15
Payer: MEDICARE

## 2025-01-15 VITALS
WEIGHT: 165 LBS | OXYGEN SATURATION: 97 % | HEIGHT: 70 IN | BODY MASS INDEX: 23.62 KG/M2 | HEART RATE: 64 BPM | DIASTOLIC BLOOD PRESSURE: 47 MMHG | SYSTOLIC BLOOD PRESSURE: 126 MMHG

## 2025-01-15 DIAGNOSIS — I73.9 PAD (PERIPHERAL ARTERY DISEASE) (HCC): Chronic | ICD-10-CM

## 2025-01-15 DIAGNOSIS — I25.10 CORONARY ARTERY DISEASE INVOLVING NATIVE CORONARY ARTERY OF NATIVE HEART WITHOUT ANGINA PECTORIS: ICD-10-CM

## 2025-01-15 DIAGNOSIS — I10 PRIMARY HYPERTENSION: ICD-10-CM

## 2025-01-15 DIAGNOSIS — K55.1 MESENTERIC ARTERY STENOSIS (HCC): ICD-10-CM

## 2025-01-15 DIAGNOSIS — N18.30 BENIGN HYPERTENSION WITH CHRONIC KIDNEY DISEASE, STAGE III (HCC): ICD-10-CM

## 2025-01-15 DIAGNOSIS — I47.29 NSVT (NONSUSTAINED VENTRICULAR TACHYCARDIA) (HCC): ICD-10-CM

## 2025-01-15 DIAGNOSIS — I50.32 CHRONIC DIASTOLIC CHF (CONGESTIVE HEART FAILURE) (HCC): ICD-10-CM

## 2025-01-15 DIAGNOSIS — R00.1 SINUS BRADYCARDIA: Primary | ICD-10-CM

## 2025-01-15 DIAGNOSIS — I25.5 ISCHEMIC CARDIOMYOPATHY: ICD-10-CM

## 2025-01-15 DIAGNOSIS — I12.9 BENIGN HYPERTENSION WITH CHRONIC KIDNEY DISEASE, STAGE III (HCC): ICD-10-CM

## 2025-01-15 DIAGNOSIS — I50.33 ACUTE ON CHRONIC HEART FAILURE WITH PRESERVED EJECTION FRACTION (HFPEF) (HCC): ICD-10-CM

## 2025-01-15 PROCEDURE — 99214 OFFICE O/P EST MOD 30 MIN: CPT | Performed by: INTERNAL MEDICINE

## 2025-01-15 NOTE — PROGRESS NOTES
.Cardiology   Jay Roach Jr. 79 y.o. male MRN: [id number]        Impression:  1. Coronary artery disease s/p CABG - s/p recent PCI of RCA  2. Hypertension - borderline control.  3. Severe bilateral carotid disease - followed by vascular surgery.  4. Dyslipidemia - doing well.  5. Peripheral arterial disease - s/p LLE revascularization and RLE revascularization 3/23.  6. Aortic stenosis - s/p TAVR 6/22  7. Bradycardia - on low-dose b-blockers.   8. CKD - CKD IV Cr 2.4  9.  Mesenteric ischemia - s/p stent 12/24.     Recommendations:  1. Continue current medications.   2. Increase fluid to 2000 ml/day.  3. Follow up in 4 months.         HPI: Jay Roach Jr. is a 79 y.o. year old male with coronary artery disease s/p CABG with PCI of RCA 5/22, AS s.o TAVR 6/22, HTN, Dyslipidemia and LLE revascularization who returns for follow up. No chest pain, shortness of breath, or palpitations. Stress test 8/22 - no ischemia.  Echo 7/20 - normal cardiac function with normal TAVR. Does have headache.  Underwent RLE revascularization 3/23. Echo 6/23 - EF 65%, normal TAVR. Had GI bleeding in 12/23 and 1/24. Patient admitted to hospital 12/24/24 with mesenteric ischemia, and underwent ex-lap with retrograde open mesenteric stent.  Discharged on 1/3/24, but readmitted 1/6 24 with CHF exacerbation with SHOAIB - started on IV diuretics.  Echo 1/8/25 demonstrated normal LV systolic function, normal TAVR, mild MR.Cr 1/11/25 - 2.4. Major complaint is fatigue and dyspnea.         Review of Systems   Constitutional:  Positive for fatigue.   HENT: Negative.     Eyes: Negative.    Respiratory:  Negative for chest tightness and shortness of breath.    Cardiovascular:  Negative for chest pain, palpitations and leg swelling.   Gastrointestinal: Negative.    Endocrine: Negative.    Genitourinary: Negative.    Musculoskeletal: Negative.    Skin: Negative.    Allergic/Immunologic: Negative.    Neurological: Negative.    Hematological:  Negative.    Psychiatric/Behavioral: Negative.     All other systems reviewed and are negative.        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 ([facility]) 09/10/2021    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 ([facility]) 02/24/2023    Basal cell carcinoma     right cheek    CAD (coronary artery disease)     Carotid stenosis, asymptomatic, bilateral     Chronic kidney disease     Colon polyp     Dependence on respirator (ventilator) status ([facility]) 04/07/2023    Diabetes ([facility])     type 2, non-insulin dependent    Diabetes mellitus ([facility])     GERD (gastroesophageal reflux disease)     History of nephrolithiasis     Hyperlipidemia     Hypertension     Left foot pain 11/30/2022    Lung cancer ([facility])     PAD (peripheral artery disease) ([facility])     Severe aortic stenosis     Squamous cell skin cancer 11/22/2022    left superior helix     Past Surgical History:   Procedure Laterality Date    AORTA - SUPERIOR MESENTERIC ARTERY BYPASS GRAFT N/A 12/24/2024    Procedure: OPEN MESENTERIC STENTING;  Surgeon: Eagle Higuera MD;  [facility];  Service: Vascular    APPENDECTOMY      ARTERIOGRAM N/A 12/24/2024    Procedure: ARTERIOGRAM;  Surgeon: Eagle Higuera MD;  [facility];  Service: Vascular    CARDIAC CATHETERIZATION N/A 05/05/2022    Procedure: CARDIAC RHC/LHC;  Surgeon: Arvin Sanchez DO;  [facility];  Service: Cardiology    CARDIAC CATHETERIZATION N/A 05/05/2022    Procedure: Cardiac Coronary Angiogram;  Surgeon: Arvin Sanchez DO;  [facility];  Service: Cardiology    CARDIAC CATHETERIZATION N/A 05/24/2022    Procedure: Cardiac pci;  Surgeon: Damien Jeter MD;  [facility];  Service: Cardiology    CARDIAC CATHETERIZATION N/A 06/14/2022    Procedure: CARDIAC TAVR;  Surgeon: Nahum Vaz MD;  [facility];  Service: Cardiology    COLECTOMY      COLONOSCOPY  2013    CORONARY ARTERY BYPASS GRAFT  2013    X 2    FEMORAL ARTERY - POPLITEAL ARTERY BYPASS GRAFT      HEMORRHOID SURGERY      IR AORTAGRAM WITH RUN-OFF  11/19/2018    IR AORTAGRAM WITH RUN-OFF  03/02/2023    IR BIOPSY LUNG  4/17/2024    IR LOWER EXTREMITY ANGIOGRAM  03/23/2023    IR MESENTERIC/VISCERAL ANGIOGRAM  12/24/2024    MOHS SURGERY Left 01/11/2023    SCC left superior helix-Dr Guy    RI LAPS ABD PRTM&OMENTUM DX W/WO SPEC BR/WA SPX N/A 12/24/2024    Procedure: EXPLORATORY LAPAROTOMY, LYSIS OF ADHESIONS, ABDOMEN CLOSURE;  Surgeon: Alvin Flores MD;  [facility];  Service: General    RI SLCTV CATHJ 3RD+ ORD SLCTV ABDL PEL/LXTR BRNCH Left 08/12/2016    Procedure: LEFT FEMORAL ARTERIOGRAM; BALLOON ANGIOPLASTY; SFA  AND FEMORAL AT VEIN GRAFT;  Surgeon: Nicholas Urena MD;  [facility];  Service: Vascular    RI TEAEC W/WO PATCH GRAFT COMMON FEMORAL Right 03/23/2023    Procedure: Common femoral endarterectomy&antegrade intervention of SFA, popliteal artery w/ shockwave;  Surgeon: Eagle Higuera MD;  [facility];  Service: Vascular    RI TRANSCATHETER TRANSAPICAL REPLACEMT AORTIC VALVE N/A 06/14/2022    Procedure: REPLACEMENT AORTIC VALVE TRANSCATHETER (TAVR) TRANSAPICAL 29MM IRVIN KYLER S3 ULTRA VALVE(ACCESS ON LEFT) ELANA;  Surgeon: Amarjit Gordon DO;  [facility];  Service: Cardiac Surgery    SKIN CANCER EXCISION      TONSILLECTOMY AND ADENOIDECTOMY      UPPER GASTROINTESTINAL ENDOSCOPY       Social History     Substance and Sexual Activity   Alcohol Use Not Currently    Comment: rare     Social History     Substance and Sexual Activity   Drug Use No     Social History     Tobacco Use   Smoking Status Every Day    Current packs/day: 1.00    Average packs/day: 0.6 packs/day for 100.0 years (62.5 ttl pk-yrs)    Types: Cigarettes    Passive exposure: Current   Smokeless Tobacco Never   Tobacco Comments    4  cigarettes per day     Family History   Problem Relation Age of Onset    Heart attack Father     Other Sister         bypass and vlave replacement    Stroke Paternal Uncle     Arrhythmia Neg Hx     Asthma Neg Hx     Clotting disorder Neg Hx     Fainting Neg Hx     Anuerysm Neg Hx     Hypertension Neg Hx         unsure     Hyperlipidemia Neg Hx     Heart failure Neg Hx        Allergies:  No Known Allergies    Medications:     Current Outpatient Medications:     allopurinol (ZYLOPRIM) 300 mg tablet, TAKE 1 TABLET DAILY, Disp: 90 tablet, Rfl: 1    amLODIPine (NORVASC) 10 mg tablet, TAKE 1 TABLET DAILY, Disp: 90 tablet, Rfl: 3    atorvastatin (LIPITOR) 80 mg tablet, Take 1 tablet (80 mg total) by mouth daily at bedtime, Disp: 90 tablet, Rfl: 1    calcitriol (ROCALTROL) 0.25 mcg capsule, Take 1 capsule (0.25 mcg total) by mouth daily, Disp: 90 capsule, Rfl: 4    clopidogrel (PLAVIX) 75 mg tablet, TAKE 1 TABLET DAILY, Disp: 90 tablet, Rfl: 3    ferrous sulfate 325 (65 Fe) mg tablet, Take 325 mg by mouth every other day, Disp: , Rfl:     hydrALAZINE (APRESOLINE) 25 mg tablet, Take 3 tablets (75 mg total) by mouth 2 (two) times a day, Disp: 180 tablet, Rfl: 0    magnesium Oxide (MAG-OX) 400 mg TABS, Take 1 tablet (400 mg total) by mouth 2 (two) times a day, Disp: 60 tablet, Rfl: 0    metoprolol tartrate (LOPRESSOR) 25 mg tablet, Take 0.5 tablets (12.5 mg total) by mouth every 12 (twelve) hours, Disp: 30 tablet, Rfl: 0    pancrelipase, Lip-Prot-Amyl, (Creon) 24,000 units, TAKE 1 CAPSULE THREE TIMES A DAY WITH MEALS, Disp: 300 capsule, Rfl: 1    pantoprazole (PROTONIX) 40 mg tablet, Take 1 tablet (40 mg total) by mouth 2 (two) times a day, Disp: 180 tablet, Rfl: 1    potassium chloride (Klor-Con M20) 20 mEq tablet, Take 1 tablet (20 mEq total) by mouth daily Do not start before January 12, 2025., Disp: 30 tablet, Rfl: 0    torsemide (DEMADEX) 20 mg tablet, Take 1 tablet (20 mg total) by mouth daily Do not start before January  "12, 2025., Disp: 30 tablet, Rfl: 0    glimepiride (AMARYL) 1 mg tablet, Take 1 tablet (1 mg total) by mouth daily with breakfast (Patient taking differently: Take 1 mg by mouth daily with breakfast. pt takes 1/2 pill (0.5mg) daily PO with breakfast), Disp: 90 tablet, Rfl: 1      Wt Readings from Last 3 Encounters:   01/15/25 74.8 kg (165 lb)   01/14/25 71.1 kg (156 lb 12.8 oz)   01/11/25 72.5 kg (159 lb 13.3 oz)     Temp Readings from Last 3 Encounters:   01/14/25 98.2 °F (36.8 °C) (Temporal)   01/12/25 98 °F (36.7 °C) (Temporal)   01/11/25 (!) 97.2 °F (36.2 °C)     BP Readings from Last 3 Encounters:   01/15/25 (!) 126/47   01/14/25 126/80   01/12/25 120/80     Pulse Readings from Last 3 Encounters:   01/15/25 64   01/14/25 62   01/12/25 78         Physical Exam  HENT:      Head: Atraumatic.      Mouth/Throat:      Mouth: Mucous membranes are moist.   Eyes:      Extraocular Movements: Extraocular movements intact.   Cardiovascular:      Rate and Rhythm: Normal rate and regular rhythm.      Heart sounds: Normal heart sounds.   Pulmonary:      Effort: Pulmonary effort is normal.      Breath sounds: Normal breath sounds.   Abdominal:      General: Abdomen is flat.   Musculoskeletal:         General: Normal range of motion.      Cervical back: Normal range of motion.   Skin:     General: Skin is warm.   Neurological:      General: No focal deficit present.      Mental Status: He is alert and oriented to person, place, and time.   Psychiatric:         Mood and Affect: Mood normal.         Behavior: Behavior normal.           Laboratory Studies:  CMP:  Lab Results   Component Value Date     11/22/2015    K 4.2 01/11/2025     01/11/2025    CO2 29 01/11/2025    ANIONGAP 7 11/22/2015    BUN 38 (H) 01/11/2025    CREATININE 2.47 (H) 01/11/2025    GLUCOSE 123 12/24/2024    AST 19 01/06/2025    ALT 20 01/06/2025    EGFR 23 01/11/2025       Lipid Profile:   No results found for: \"CHOL\"  Lab Results   Component Value " Date    HDL 52 2022     Lab Results   Component Value Date    LDLCALC 33 2022     Lab Results   Component Value Date    TRIG 50 2022       Cardiac testing:   EKG reviewed personally:   Results for orders placed during the hospital encounter of 21    Echo complete with contrast if indicated    [facility]  [address]  [place] [address]  [phone]    Transthoracic Echocardiogram  2D, M-mode, Doppler, and Color Doppler    Study date:  06-May-2021    Patient: TANNA BROWNLEE  MR number: [id number]  Account number: [id number]  : 1945  Age: 76 years  Gender: Male  Status: Outpatient  Location: [facility]  Height: 70 in  Weight: 181 lb  BP: 150/ 58 mmHg    Indications: Assess the aortic valve.    Diagnoses: I35.0 - Nonrheumatic aortic (valve) stenosis    Sonographer:  Cyn Chapa RDCS  Primary Physician:  Simón Hadley MD  Referring Physician:  Israel Sanchez MD  Group:  [facility]  Interpreting Physician:  Brayden Ortiz MD    SUMMARY    LEFT VENTRICLE:  Systolic function was normal. Ejection fraction was estimated to be 60 %.  There were no regional wall motion abnormalities.  Wall thickness was mildly increased.  Doppler parameters were consistent with abnormal left ventricular relaxation (grade 1 diastolic dysfunction).    LEFT ATRIUM:  The atrium was mildly dilated.    MITRAL VALVE:  There was mild regurgitation.    AORTIC VALVE:  The valve was probably trileaflet. Leaflets exhibited moderately increased thickness and moderate calcification.  There was severe stenosis.  There was trace regurgitation.  Valve peak gradient was 63 mmHg.  Valve mean gradient was 32 mmHg.  Aortic valve area was 1 cmï¾² by the continuity equation.  Estimated aortic valve area (by VTI) was 0.91 cmï¾².    TRICUSPID VALVE:  There was trace regurgitation.    HISTORY: PRIOR HISTORY: CAD, bradycardia, DM, dyslipidemia, PAD,  CABG,    PROCEDURE: The study was performed in the [facility]. This was a routine study. The transthoracic approach was used. The study included complete 2D imaging, M-mode, complete spectral Doppler, and color Doppler. The heart rate was  66 bpm, at the start of the study. Images were obtained from the parasternal, apical, subcostal, and suprasternal notch acoustic windows. Image quality was adequate.    LEFT VENTRICLE: Size was normal. Systolic function was normal. Ejection fraction was estimated to be 60 %. There were no regional wall motion abnormalities. Wall thickness was mildly increased. DOPPLER: Doppler parameters were consistent  with abnormal left ventricular relaxation (grade 1 diastolic dysfunction).    RIGHT VENTRICLE: The size was normal. Systolic function was normal. Wall thickness was normal.    LEFT ATRIUM: The atrium was mildly dilated.    RIGHT ATRIUM: Size was normal.    MITRAL VALVE: Valve structure was normal. There was normal leaflet separation. DOPPLER: The transmitral velocity was within the normal range. There was no evidence for stenosis. There was mild regurgitation.    AORTIC VALVE: The valve was probably trileaflet. Leaflets exhibited moderately increased thickness and moderate calcification. DOPPLER: There was severe stenosis. There was trace regurgitation.    TRICUSPID VALVE: The valve structure was normal. There was normal leaflet separation. DOPPLER: The transtricuspid velocity was within the normal range. There was no evidence for stenosis. There was trace regurgitation.    PULMONIC VALVE: Leaflets exhibited normal thickness, no calcification, and normal cuspal separation. DOPPLER: The transpulmonic velocity was within the normal range. There was no significant regurgitation.    PERICARDIUM: There was no pericardial effusion. The pericardium was normal in appearance.    AORTA: The root exhibited normal size.    SYSTEMIC VEINS: IVC: The inferior vena cava was normal in  size.    MEASUREMENT TABLES    2D MEASUREMENTS  LVOT   (Reference normals)  Diam   23 mm   (--)    DOPPLER MEASUREMENTS  LVOT   (Reference normals)  VTI   24 cm   (--)  R-R interval   1017 ms   (--)  HR   59 bpm   (--)  Stroke vol   99.71 ml   (--)  Cardiac output   5.88 L/min   (--)  Cardiac index   2.94 L/min/mï¾²   (--)  Aortic valve   (Reference normals)  VTI   108 cm   (--)  Peak gradient   63 mmHg   (--)  Mean gradient   32 mmHg   (--)  Valve area, cont   1 cmï¾²   (--)  Obstr index, VTI   0.22    (--)  Valve area, VTI   0.91 cmï¾²   (--)  Area index, VTI   0.46 cmï¾²/mï¾²   (--)    SYSTEM MEASUREMENT TABLES    2D  %FS: 23.52 %  Ao Diam: 3.06 cm  EDV(Teich): 137.67 ml  EF(Teich): 46.6 %  ESV(Teich): 73.51 ml  IVSd: 1.26 cm  LA Area: 24.18 cm2  LA Diam: 4.19 cm  LVEDV MOD A4C: 198.32 ml  LVEF MOD A4C: 54.08 %  LVESV MOD A4C: 91.07 ml  LVIDd: 5.34 cm  LVIDs: 4.08 cm  LVLd A4C: 10.15 cm  LVLs A4C: 8.98 cm  LVOT Diam: 2.43 cm  LVPWd: 1.18 cm  RA Area: 14.06 cm2  RVIDd: 3.75 cm  SV MOD A4C: 107.24 ml  SV(Teich): 64.16 ml    CW  AV Env.Ti: 414.84 ms  AV MaxP.54 mmHg  AV VTI: 103.91 cm  AV Vmax: 3.82 m/s  AV Vmean: 2.5 m/s  AV meanP.08 mmHg    MM  TAPSE: 2.13 cm    PW  LUIS (VTI): 1.06 cm2  LUIS Vmax: 1.04 cm2  E' Sept: 0.04 m/s  E/E' Sept: 22.83  LVOT Env.Ti: 482.08 ms  LVOT VTI: 23.7 cm  LVOT Vmax: 0.86 m/s  LVOT Vmean: 0.5 m/s  LVOT maxP.94 mmHg  LVOT meanP.25 mmHg  LVSV Dopp: 110.25 ml  MV A Pop: 1.23 m/s  MV Dec Bolivar: 5.05 m/s2  MV DecT: 180.67 ms  MV E Pop: 0.91 m/s  MV E/A Ratio: 0.74  MV PHT: 52.39 ms  MVA By PHT: 4.2 cm2    [facility] Accredited Echocardiography Laboratory    Prepared and electronically signed by    Brayden Ortiz MD  Signed 06-May-2021 16:40:31    No results found for this or any previous visit.    No results found for this or any previous visit.    No results found for this or any previous visit.

## 2025-01-15 NOTE — PATIENT INSTRUCTIONS
Recommendations:  1. Continue current medications.   2. Increase fluid to 2000 ml/day.  3. Follow up in 3 months.

## 2025-01-15 NOTE — PROGRESS NOTES
JAMES MEEK received referral for assistance with extra help at home.  JAMES MEEK reviewed patient's chart.  He has [facility] and recent request was made for [facility] MSW.  JAMES MEEK spoke with [facility] RN via [employer] Secure chat and confirmed [facility] MSW will be addressing patient's needs.  JAMES MEEK closed referral.

## 2025-01-16 ENCOUNTER — TELEPHONE (OUTPATIENT)
Age: 80
End: 2025-01-16

## 2025-01-16 ENCOUNTER — HOME CARE VISIT (OUTPATIENT)
Dept: HOME HEALTH SERVICES | Facility: HOME HEALTHCARE | Age: 80
End: 2025-01-16
Payer: MEDICARE

## 2025-01-16 VITALS
RESPIRATION RATE: 17 BRPM | BODY MASS INDEX: 22.64 KG/M2 | WEIGHT: 157.8 LBS | OXYGEN SATURATION: 97 % | HEART RATE: 56 BPM | SYSTOLIC BLOOD PRESSURE: 132 MMHG | TEMPERATURE: 98.8 F | DIASTOLIC BLOOD PRESSURE: 64 MMHG

## 2025-01-16 PROCEDURE — G0299 HHS/HOSPICE OF RN EA 15 MIN: HCPCS

## 2025-01-16 NOTE — TELEPHONE ENCOUNTER
Hello,    The following message was sent via e-mail to the leadership team:     Please advise if you can help facilitate the following overbook request:    Patient Name: Jay Roach    Patient MRN: [id number]    Call back #: [phone]    [place] [facility] [place]    Speciality:     Reason for overbook request: PROVIDER REQUEST    Comments:  Patient was admitted on 12/24/24 for further management of his mesenteric ischemia. The patient would undergo an exploratory laparotomy, retrograde open mesenteric stent with the general surgery and vascular surgery 12/24/24. Follow up with vascular within 2 weeks per discharge summary. Patient was readmitted 1/6/25 - 1/11/25. I spoke with Surgery Coordinator who stated there are no sooner appointments.    Requested doctor and location: [facility]    Date of current appointment: 2/19/25      Thank you.

## 2025-01-16 NOTE — TELEPHONE ENCOUNTER
Patient called in to advise he was just in the hospital for surgery and was told he needs to follow-up with Dr Muñoz within 2 weeks. First appt I can find is in April. Patient said that is too far away.    Please call patient back.

## 2025-01-16 NOTE — TELEPHONE ENCOUNTER
Pt called in to schedule staples removal after procedure done 12/24/24. Main office contacted for sooner appt.

## 2025-01-17 ENCOUNTER — HOME CARE VISIT (OUTPATIENT)
Dept: HOME HEALTH SERVICES | Facility: HOME HEALTHCARE | Age: 80
End: 2025-01-17
Payer: MEDICARE

## 2025-01-17 ENCOUNTER — APPOINTMENT (OUTPATIENT)
Dept: LAB | Facility: MEDICAL CENTER | Age: 80
End: 2025-01-17
Payer: MEDICARE

## 2025-01-17 VITALS
OXYGEN SATURATION: 96 % | SYSTOLIC BLOOD PRESSURE: 134 MMHG | DIASTOLIC BLOOD PRESSURE: 54 MMHG | HEART RATE: 51 BPM | BODY MASS INDEX: 23.03 KG/M2 | WEIGHT: 160.5 LBS | RESPIRATION RATE: 16 BRPM

## 2025-01-17 DIAGNOSIS — N18.4 CKD (CHRONIC KIDNEY DISEASE) STAGE 4, GFR 15-29 ML/MIN (HCC): ICD-10-CM

## 2025-01-17 LAB
ANION GAP SERPL CALCULATED.3IONS-SCNC: 7 MMOL/L (ref 4–13)
BUN SERPL-MCNC: 44 MG/DL (ref 5–25)
CALCIUM SERPL-MCNC: 9.2 MG/DL (ref 8.4–10.2)
CHLORIDE SERPL-SCNC: 102 MMOL/L (ref 96–108)
CO2 SERPL-SCNC: 31 MMOL/L (ref 21–32)
CREAT SERPL-MCNC: 2.76 MG/DL (ref 0.6–1.3)
GFR SERPL CREATININE-BSD FRML MDRD: 20 ML/MIN/1.73SQ M
GLUCOSE P FAST SERPL-MCNC: 124 MG/DL (ref 65–99)
POTASSIUM SERPL-SCNC: 4.3 MMOL/L (ref 3.5–5.3)
SODIUM SERPL-SCNC: 140 MMOL/L (ref 135–147)

## 2025-01-17 PROCEDURE — G0151 HHCP-SERV OF PT,EA 15 MIN: HCPCS

## 2025-01-17 PROCEDURE — 80048 BASIC METABOLIC PNL TOTAL CA: CPT

## 2025-01-17 PROCEDURE — 36415 COLL VENOUS BLD VENIPUNCTURE: CPT

## 2025-01-17 NOTE — TELEPHONE ENCOUNTER
Called and spoke with Ramón and he had his BMP done today and scheduled with Nelida Hopper Thursday 2/6 11:30. Canceled the apt with Dr. Muñoz 1/30 9 am because it was too early.

## 2025-01-20 ENCOUNTER — OFFICE VISIT (OUTPATIENT)
Dept: SURGERY | Facility: CLINIC | Age: 80
End: 2025-01-20

## 2025-01-20 ENCOUNTER — RESULTS FOLLOW-UP (OUTPATIENT)
Dept: NEPHROLOGY | Facility: CLINIC | Age: 80
End: 2025-01-20

## 2025-01-20 ENCOUNTER — PATIENT OUTREACH (OUTPATIENT)
Dept: CASE MANAGEMENT | Facility: OTHER | Age: 80
End: 2025-01-20

## 2025-01-20 ENCOUNTER — TELEPHONE (OUTPATIENT)
Age: 80
End: 2025-01-20

## 2025-01-20 VITALS — BODY MASS INDEX: 23.65 KG/M2 | WEIGHT: 165.2 LBS | HEIGHT: 70 IN | TEMPERATURE: 97.3 F

## 2025-01-20 DIAGNOSIS — K55.9 MESENTERIC ISCHEMIA (HCC): Primary | ICD-10-CM

## 2025-01-20 DIAGNOSIS — N18.4 CKD (CHRONIC KIDNEY DISEASE) STAGE 4, GFR 15-29 ML/MIN (HCC): Primary | ICD-10-CM

## 2025-01-20 PROCEDURE — 99024 POSTOP FOLLOW-UP VISIT: CPT | Performed by: SURGERY

## 2025-01-20 NOTE — TELEPHONE ENCOUNTER
Renal function worsened to creatinine 2.76 mg/dl   Recent hospital admission and was discharged on torsemide 20 mg daily , Previously was on torsemide 10 mg daily..  - not able to reach on phone, left message that we may need to accept higher creatinine to maintain volume status. Repeat BMP in 10 days and if renal function continue to worsen , would consider decreasing torsemide dose to 20 mg alternating with 10 mg.     Has follow up on 2/6 with edwin

## 2025-01-20 NOTE — PROGRESS NOTES
Name: Jay Roach Jr.      : 1945      MRN: [id number]  Encounter Provider: Jennifer Gibbons MD  Encounter Date: 2025   Encounter department: [facility] GENERAL SURGERY [place]  :  Assessment & Plan  Mesenteric ischemia (HCC)  Jay Roach Jr. is a 79 y.o. male with PMH of constipation, HTN, diabetes, smoking, PAD s/p LLE angioplasty common femoral endarterectomy and stenting, s/p TAVR (on plavix), CKD, lung cancer s/p radiation therapy, and appendectomy with right hemicolectomy who initially presented to the hospital with mesenteric ischemia. He is s/p 12/24 exploratory laparotomy, retrograde open mesenteric stent with the general surgery and vascular surgery team. He is overall doing well post operatively.     -Continue diet as instructed  -Midline staples removed in the office today, can follow up with the general surgery service as needed  -Recommended calling the nephrology office to schedule an appointment given continually rising creatinine level.   -All questions were answered today.            History of Present Illness   HPI  Jay Roach Jr. is a 79 y.o. male with PMH of constipation, HTN, diabetes, smoking, PAD s/p LLE angioplasty common femoral endarterectomy and stenting, s/p TAVR (on plavix), CKD, lung cancer s/p radiation therapy, and appendectomy with right hemicolectomy who initially presented to the hospital with mesenteric ischemia. He is s/p 12/24 exploratory laparotomy, retrograde open mesenteric stent with the general surgery and vascular surgery team.    He is overall doing well. He denies any abdominal pain, nausea, vomiting, fevers, chills, chest pain, shortness of breath. He has been tolerating a diet but finds it difficult to find foods to eat given cardiac and renal restrictions. He has been having black, tarry bowel movements but has been taking iron supplements every other day. Upon review of his labs, his hemoglobin has remained stable. Cr has  "been up-trending to 2.76.     History obtained from: patient    Review of Systems   Constitutional:  Negative for chills and fever.   HENT:  Negative for trouble swallowing.    Eyes:  Negative for visual disturbance.   Respiratory:  Positive for shortness of breath (on exertion).    Cardiovascular:  Negative for chest pain.   Gastrointestinal:  Negative for nausea and vomiting.        Black stools   Skin:  Negative for color change.   Neurological:  Negative for speech difficulty.   Psychiatric/Behavioral:  Negative for behavioral problems.        Medical History Reviewed by provider this encounter:  Tobacco  Allergies  Meds  Problems  Med Hx  Surg Hx  Fam Hx     .     Objective   Temp (!) 97.3 °F (36.3 °C) (Temporal)   Ht 5' 10\" (1.778 m)   Wt 74.9 kg (165 lb 3.2 oz)   BMI 23.70 kg/m²      Physical Exam  Constitutional:       Appearance: Normal appearance.   HENT:      Head: Normocephalic and atraumatic.      Right Ear: External ear normal.      Left Ear: External ear normal.      Nose: Nose normal.   Eyes:      Conjunctiva/sclera: Conjunctivae normal.   Pulmonary:      Effort: Pulmonary effort is normal. No respiratory distress.   Abdominal:      General: Abdomen is flat. There is no distension.      Palpations: Abdomen is soft.      Tenderness: There is no abdominal tenderness. There is no guarding or rebound.      Comments: Midline incision is CDI. Staples removed today.    Skin:     General: Skin is warm and dry.   Neurological:      Mental Status: He is alert and oriented to person, place, and time.   Psychiatric:         Mood and Affect: Mood normal.         Behavior: Behavior normal.           "

## 2025-01-20 NOTE — PATIENT INSTRUCTIONS
Please call the nephrology office to schedule your next appointment and discuss creatinine levels  -Please follow up with the general surgery service as needed.

## 2025-01-20 NOTE — ASSESSMENT & PLAN NOTE
Jay Roach Jr. is a 79 y.o. male with PMH of constipation, HTN, diabetes, smoking, PAD s/p LLE angioplasty common femoral endarterectomy and stenting, s/p TAVR (on plavix), CKD, lung cancer s/p radiation therapy, and appendectomy with right hemicolectomy who initially presented to the hospital with mesenteric ischemia. He is s/p 12/24 exploratory laparotomy, retrograde open mesenteric stent with the general surgery and vascular surgery team. He is overall doing well post operatively.     -Continue diet as instructed  -Midline staples removed in the office today, can follow up with the general surgery service as needed  -Recommended calling the nephrology office to schedule an appointment given continually rising creatinine level.   -All questions were answered today.

## 2025-01-20 NOTE — TELEPHONE ENCOUNTER
Not able to reach patient, left message.    Renal function worsened to creatinine 2.76 mg/dl   Recent hospital admission and was discharged on torsemide 20 mg daily , Previously was on torsemide 10 mg daily..  - not able to reach on phone, left message that we may need to accept higher creatinine to maintain volume status. Repeat BMP in 10 days and if renal function continue to worsen , would consider decreasing torsemide dose to 20 mg alternating with 10 mg.  BMP already ordered in epic

## 2025-01-20 NOTE — PROGRESS NOTES
Outpatient Care Management Note:    Voice mail message left for Jay, with my contact information, requesting a call back.

## 2025-01-20 NOTE — TELEPHONE ENCOUNTER
- I left a message for Jay below and advised he call the office back to make sure he understood the instructions. Labs already in epic no fasting needed.        Renal function worsened to creatinine 2.76 mg/dl   Recent hospital admission and was discharged on torsemide 20 mg daily , Previously was on torsemide 10 mg daily..  - not able to reach on phone, left message that we may need to accept higher creatinine to maintain volume status. Repeat BMP in 10 days and if renal function continue to worsen , would consider decreasing torsemide dose to 20 mg alternating with 10 mg.     Has follow up on 2/6 with edwin

## 2025-01-21 ENCOUNTER — HOME CARE VISIT (OUTPATIENT)
Dept: HOME HEALTH SERVICES | Facility: HOME HEALTHCARE | Age: 80
End: 2025-01-21
Payer: MEDICARE

## 2025-01-21 VITALS
DIASTOLIC BLOOD PRESSURE: 52 MMHG | TEMPERATURE: 98.8 F | RESPIRATION RATE: 16 BRPM | OXYGEN SATURATION: 97 % | SYSTOLIC BLOOD PRESSURE: 116 MMHG | HEART RATE: 50 BPM

## 2025-01-21 VITALS
OXYGEN SATURATION: 98 % | SYSTOLIC BLOOD PRESSURE: 124 MMHG | RESPIRATION RATE: 18 BRPM | DIASTOLIC BLOOD PRESSURE: 50 MMHG | HEART RATE: 50 BPM

## 2025-01-21 PROCEDURE — G0152 HHCP-SERV OF OT,EA 15 MIN: HCPCS

## 2025-01-21 PROCEDURE — G0156 HHCP-SVS OF AIDE,EA 15 MIN: HCPCS

## 2025-01-21 PROCEDURE — G0299 HHS/HOSPICE OF RN EA 15 MIN: HCPCS

## 2025-01-22 ENCOUNTER — HOME CARE VISIT (OUTPATIENT)
Dept: HOME HEALTH SERVICES | Facility: HOME HEALTHCARE | Age: 80
End: 2025-01-22
Payer: MEDICARE

## 2025-01-22 PROCEDURE — G0155 HHCP-SVS OF CSW,EA 15 MIN: HCPCS

## 2025-01-23 ENCOUNTER — OFFICE VISIT (OUTPATIENT)
Dept: FAMILY MEDICINE CLINIC | Facility: CLINIC | Age: 80
End: 2025-01-23
Payer: MEDICARE

## 2025-01-23 VITALS
TEMPERATURE: 99 F | HEART RATE: 52 BPM | HEIGHT: 70 IN | SYSTOLIC BLOOD PRESSURE: 124 MMHG | OXYGEN SATURATION: 97 % | WEIGHT: 161 LBS | DIASTOLIC BLOOD PRESSURE: 46 MMHG | BODY MASS INDEX: 23.05 KG/M2

## 2025-01-23 DIAGNOSIS — K21.9 GASTROESOPHAGEAL REFLUX DISEASE WITHOUT ESOPHAGITIS: ICD-10-CM

## 2025-01-23 DIAGNOSIS — I25.10 CORONARY ARTERY DISEASE INVOLVING NATIVE CORONARY ARTERY OF NATIVE HEART WITHOUT ANGINA PECTORIS: ICD-10-CM

## 2025-01-23 DIAGNOSIS — I10 PRIMARY HYPERTENSION: ICD-10-CM

## 2025-01-23 DIAGNOSIS — N18.4 TYPE 2 DIABETES MELLITUS WITH STAGE 4 CHRONIC KIDNEY DISEASE, WITHOUT LONG-TERM CURRENT USE OF INSULIN (HCC): ICD-10-CM

## 2025-01-23 DIAGNOSIS — E78.2 MIXED HYPERLIPIDEMIA: ICD-10-CM

## 2025-01-23 DIAGNOSIS — I70.213 ATHEROSCLEROSIS OF NATIVE ARTERIES OF EXTREMITIES WITH INTERMITTENT CLAUDICATION, BILATERAL LEGS (HCC): Primary | Chronic | ICD-10-CM

## 2025-01-23 DIAGNOSIS — E11.22 TYPE 2 DIABETES MELLITUS WITH STAGE 4 CHRONIC KIDNEY DISEASE, WITHOUT LONG-TERM CURRENT USE OF INSULIN (HCC): ICD-10-CM

## 2025-01-23 DIAGNOSIS — Z95.2 S/P TAVR (TRANSCATHETER AORTIC VALVE REPLACEMENT): ICD-10-CM

## 2025-01-23 PROCEDURE — 99495 TRANSJ CARE MGMT MOD F2F 14D: CPT | Performed by: FAMILY MEDICINE

## 2025-01-23 NOTE — ASSESSMENT & PLAN NOTE
Patient is stable with current meds and discussed a low carb diet. Pt  recommended to see eye doctor and foot doctor for routine screening as per protocol. Recheck A1C  and Cr in 3 months. Patient questions answered today about this condtion.   Lab Results   Component Value Date    HGBA1C 6.6 (H) 12/24/2024

## 2025-01-23 NOTE — PROGRESS NOTES
Name: Jay Roach Jr.      : 1945      MRN: [id number]  Encounter Provider: Smión Hadley MD  Encounter Date: 2025   Encounter department: [facility]  :  Assessment & Plan  Atherosclerosis of native arteries of extremities with intermittent claudication, bilateral legs (HCC)  Patient is stable  and will continue present plan of care and reassess at next routine visit. All questions about this problem from patient were answered today.        Primary hypertension  Patient is stable with current anti-hypertensive medicine and continue to follow a low sodium diet and take current medication. All questions about this condition were answered today.        Coronary artery disease involving native coronary artery of native heart without angina pectoris  Patient to continue  with current cardiac meds to decrease risk of re stenosis. Patient to follow up with cardiology for scheduled appointments to decrease risk for further cardiac problems with appropriate diagnostic testing to reassess cardiac status. Patient had alll questions about this problem answered today.        Type 2 diabetes mellitus with stage 4 chronic kidney disease, without long-term current use of insulin (HCC)  Patient is stable with current meds and discussed a low carb diet. Pt  recommended to see eye doctor and foot doctor for routine screening as per protocol. Recheck A1C  and Cr in 3 months. Patient questions answered today about this condtion.   Lab Results   Component Value Date    HGBA1C 6.6 (H) 2024          Gastroesophageal reflux disease without esophagitis  Patient to continue with present therapy and decrease caffeine, avoid ETOH and smoking to decrease acid production. Pt should also cease eating prior to bedtime and avoid excessive fluid intake prior to sleep. May use antacids as needed for breakthrough GERD. All pateint questions answered today about this condition.        Mixed  hyperlipidemia  Patient  is stable with current medication and we discussed a low fat low cholesterol diet. Weight loss also discussed for this will help lower cholesterol also. Recheck lipids in 6 months.        S/P TAVR (transcatheter aortic valve replacement)  Patient is stable  and will continue present plan of care and reassess at next routine visit. All questions about this problem from patient were answered today.              Falls Plan of Care: balance, strength, and gait training instructions were provided. Home safety education provided.     History of Present Illness   79-year-old male here today for TCM visit from recent hospital stay patient actually with couple hospital stays recently patient had been admitted with ischemic bowel patient with peripheral vascular disease patient with congestive heart failure chronic kidney disease with lower abdominal pain.  Patient was admitted did have a stenting of his mesenteric artery with resolution of that problem however he does have chronic kidney disease that was increased due to dye load.  Patient will be seeing nephrology.  He does have significant congestive heart failure with since his cardiac disease with cardiomyopathy.  Patient also with type 2 diabetes.  Patient is getting services at home.  And does not need any refills on his medications although his regiment of medications has been interrupted by refills through the hospital.  When he needs refills on his medicines he will be calling us and we will straighten that out for him as he sees fit.  His medicines have been reviewed he has had medications added that he is taking Lopressor as well as magnesium he did have some electrolyte imbalances while he was in the hospital this was probably due to the diuresis that he had done while he was there he does have BMP coming I will add another magnesium because he had magnesium added because he did have low magnesium I was in the hospital.      Review of  "Systems   Constitutional:  Positive for fatigue. Negative for activity change, appetite change, chills, fever and unexpected weight change.   HENT:  Negative for congestion, ear pain, hearing loss, mouth sores, postnasal drip, sinus pressure, sinus pain, sneezing and sore throat.    Respiratory:  Negative for apnea, cough, shortness of breath and wheezing.    Cardiovascular:  Negative for chest pain, palpitations and leg swelling.   Gastrointestinal:  Negative for abdominal pain, constipation, diarrhea, nausea and vomiting.   Endocrine: Negative for cold intolerance and heat intolerance.   Genitourinary:  Negative for dysuria, frequency and hematuria.   Musculoskeletal:  Positive for gait problem. Negative for arthralgias, back pain, joint swelling and neck pain.   Skin:  Negative for rash.   Neurological:  Negative for dizziness, weakness and numbness.   Hematological:  Does not bruise/bleed easily.   Psychiatric/Behavioral:  Negative for agitation, behavioral problems, confusion, hallucinations and sleep disturbance. The patient is not nervous/anxious.        Objective   BP (!) 124/46 (BP Location: Left arm, Patient Position: Sitting, Cuff Size: Standard)   Pulse (!) 52   Temp 99 °F (37.2 °C) (Skin)   Ht 5' 10\" (1.778 m)   Wt 73 kg (161 lb)   SpO2 97%   BMI 23.10 kg/m²      TCM Call     Date and time call was made  1/13/2025 11:25 AM    Hospital care reviewed  Records reviewed    Patient was hospitialized at  [facility]    Date of Admission  01/06/25    Date of discharge  01/11/25    Diagnosis  heart failure    Disposition  Home    Were the patients medications reviewed and updated  Yes    Current Symptoms  Fatigue    Lower abdominal pain severity  Mild    Lower abdominal pain onset  Unable to assess    Weakness severity  Moderate    Dizziness severity  Mild    Fatigue severity  Mild    Quality Character  Loss of balance    Episode pattern  Rare    Cause  No known event      TCM Call     Post " hospital issues  None    Should patient be enrolled in anticoag monitoring?  Yes    Scheduled for follow up?  Yes    Patients specialists  Cardiologist    Cardiologist name  Dr. cotter    Nephrologist name  Marcel Muñoz MD    Did you obtain your prescribed medications  Yes    Do you need help managing your prescriptions or medications  No    Is transportation to your appointment needed  No    I have advised the patient to call PCP with any new or worsening symptoms  Pastora Luu CMA    Living Arrangements  Alone    Support System  Children    The type of support provided  Emotional    Do you have social support  Yes, as much as I need    Comment  Pt lives alone, Niece lives in apartment below, brother lives in house behind his.    Are you recieving any outpatient services  No    Are you recieving home care services  No    Types of home care services  Nurse visit    Are you using any community resources  No    Current waiver services  No    Have you fallen in the last 12 months  No    Interperter language line needed  No    Counseling  Patient    Counseling topics  Diagnostic results         Physical Exam  Constitutional:       Appearance: He is well-developed.   HENT:      Head: Normocephalic and atraumatic.      Right Ear: External ear normal.      Left Ear: External ear normal.      Nose: Nose normal.      Mouth/Throat:      Pharynx: Oropharynx is clear. No oropharyngeal exudate.   Eyes:      General: No scleral icterus.     Conjunctiva/sclera: Conjunctivae normal.      Pupils: Pupils are equal, round, and reactive to light.   Cardiovascular:      Rate and Rhythm: Normal rate and regular rhythm.      Heart sounds: Normal heart sounds.   Pulmonary:      Effort: Pulmonary effort is normal.      Breath sounds: Normal breath sounds. No wheezing or rales.   Abdominal:      General: Bowel sounds are normal.      Palpations: Abdomen is soft.      Tenderness: There is no abdominal tenderness. There is no guarding.    Musculoskeletal:         General: Normal range of motion.      Cervical back: Normal range of motion and neck supple.   Skin:     General: Skin is warm and dry.      Findings: No erythema or rash.   Neurological:      Mental Status: He is alert and oriented to person, place, and time. Mental status is at baseline.   Psychiatric:         Mood and Affect: Mood normal.         Behavior: Behavior normal.         Thought Content: Thought content normal.         Judgment: Judgment normal.

## 2025-01-24 ENCOUNTER — TELEMEDICINE (OUTPATIENT)
Dept: GERIATRICS | Facility: OTHER | Age: 80
End: 2025-01-24
Payer: MEDICARE

## 2025-01-24 ENCOUNTER — HOME CARE VISIT (OUTPATIENT)
Dept: HOME HEALTH SERVICES | Facility: HOME HEALTHCARE | Age: 80
End: 2025-01-24
Payer: MEDICARE

## 2025-01-24 ENCOUNTER — PATIENT OUTREACH (OUTPATIENT)
Dept: CASE MANAGEMENT | Facility: OTHER | Age: 80
End: 2025-01-24

## 2025-01-24 VITALS
SYSTOLIC BLOOD PRESSURE: 124 MMHG | OXYGEN SATURATION: 97 % | DIASTOLIC BLOOD PRESSURE: 50 MMHG | HEART RATE: 50 BPM | RESPIRATION RATE: 16 BRPM | TEMPERATURE: 98.8 F

## 2025-01-24 DIAGNOSIS — N18.4 CKD (CHRONIC KIDNEY DISEASE) STAGE 4, GFR 15-29 ML/MIN (HCC): ICD-10-CM

## 2025-01-24 DIAGNOSIS — D62 ACUTE BLOOD LOSS ANEMIA: ICD-10-CM

## 2025-01-24 DIAGNOSIS — R41.3 SHORT-TERM MEMORY LOSS: ICD-10-CM

## 2025-01-24 DIAGNOSIS — N18.4 TYPE 2 DIABETES MELLITUS WITH STAGE 4 CHRONIC KIDNEY DISEASE, WITHOUT LONG-TERM CURRENT USE OF INSULIN (HCC): ICD-10-CM

## 2025-01-24 DIAGNOSIS — I50.33 ACUTE ON CHRONIC HEART FAILURE WITH PRESERVED EJECTION FRACTION (HFPEF) (HCC): Primary | ICD-10-CM

## 2025-01-24 DIAGNOSIS — N18.30 BENIGN HYPERTENSION WITH CHRONIC KIDNEY DISEASE, STAGE III (HCC): ICD-10-CM

## 2025-01-24 DIAGNOSIS — R26.2 AMBULATORY DYSFUNCTION: ICD-10-CM

## 2025-01-24 DIAGNOSIS — I12.9 BENIGN HYPERTENSION WITH CHRONIC KIDNEY DISEASE, STAGE III (HCC): ICD-10-CM

## 2025-01-24 DIAGNOSIS — N18.4 ANEMIA DUE TO STAGE 4 CHRONIC KIDNEY DISEASE  (HCC): ICD-10-CM

## 2025-01-24 DIAGNOSIS — D63.1 ANEMIA DUE TO STAGE 4 CHRONIC KIDNEY DISEASE  (HCC): ICD-10-CM

## 2025-01-24 DIAGNOSIS — K55.1 MESENTERIC ARTERY STENOSIS (HCC): ICD-10-CM

## 2025-01-24 DIAGNOSIS — E11.22 TYPE 2 DIABETES MELLITUS WITH STAGE 4 CHRONIC KIDNEY DISEASE, WITHOUT LONG-TERM CURRENT USE OF INSULIN (HCC): ICD-10-CM

## 2025-01-24 PROCEDURE — G0299 HHS/HOSPICE OF RN EA 15 MIN: HCPCS

## 2025-01-24 PROCEDURE — G0156 HHCP-SVS OF AIDE,EA 15 MIN: HCPCS

## 2025-01-24 PROCEDURE — 99205 OFFICE O/P NEW HI 60 MIN: CPT | Performed by: NURSE PRACTITIONER

## 2025-01-24 NOTE — ASSESSMENT & PLAN NOTE
BP remains stable today, 124/50  Continue Amlodipine, Hydralazine, Metoprolol and Torsemide  Avoid hypotension  Continue to monitor BP and for acute changes in condition  Follow up with PCP/Cardiology

## 2025-01-24 NOTE — PROGRESS NOTES
Virtual Regular Visit  Name: Jay Roach Jr.      : 1945      MRN: [id number]  Encounter Provider: ZHANNA Beach  Encounter Date: 2025   Encounter department: [facility] VIRTUAL      Verification of patient location:  Patient is located at Home in the following state in which I hold an active license [place] :  Assessment & Plan  Acute on chronic heart failure with preserved ejection fraction (HFpEF) (Formerly [facility])  Wt Readings from Last 3 Encounters:   25 71.6 kg (157 lb 12.8 oz)   25 71.6 kg (157 lb 12.8 oz)   25 73 kg (161 lb)   Most recent Echo with LVEF 65%  Current wt 159 lbs  Continue to monitor weights  Continue to monitor BMP   Continue Torsemide 20 mg daily  Continue JAKOB diet  Continue to monitor for s/s of fluid overload  Follow up with PCP/Cardiology       Benign hypertension with chronic kidney disease, stage III (Formerly [facility])  BP remains stable today, 124/50  Continue Amlodipine, Hydralazine, Metoprolol and Torsemide  Avoid hypotension  Continue to monitor BP and for acute changes in condition  Follow up with PCP/Cardiology     Mesenteric artery stenosis/recent mesenteric ischemia  Hospitalized from 2024 to 1/3/2025 with mesenteric ischemia. Underwent exploratory laparotomy, lysis of adhesions, mesenteric angiogram and retrograde open mesenteric stent placement on 24.   Continue to monitor for acute changes in condition  Follow up with GI/Cardiology    Type 2 diabetes mellitus with stage 4 chronic kidney disease, without long-term current use of insulin (Formerly [facility])    Lab Results   Component Value Date    HGBA1C 6.6 (H) 2024   Bgs well controlled  Patient is currently diet controlled  Continue intermittent Accu-cheks   Educate patient on s/s of hyper/hypoglycemia  Continue to monitor for acute changes in condition   Follow up with PCP/Endocrinology     Anemia due to stage 4 chronic kidney disease  (Formerly [facility])  Most recent Hgb 7.9/Hct  24.3  Continue to monitor CBC periodically  Monitor for signs and symptoms of bleeding  Continue to monitor patient for acute changes in condition  Follow up with PCP   Acute blood loss anemia  As stated above  Orders:    CBC and differential; Future    Iron Panel (Includes Ferritin, Iron Sat%, Iron, and TIBC); Future    CKD (chronic kidney disease) stage 4, GFR 15-29 ml/min ([facility])  Most recent Creatinine 2.76/Gfr 23, baseline Cr 1.8-2.2  Continue to monitor BMP periodically  Avoid nephrotoxins and NSAIDS  Avoid hypotension  Continue to monitor for acute changes in condition  Follow up with PCP       Short-term memory loss  Patient with short-term memory los  Continue supportive measures  Continue to reorient, redirect, and reassure patient  Encourage engagement and activity  Encourage daily involvement in ADLs  Maintain delirium precautions and sleep/wake cycle  Patient remains at risk for delirium r/t age, medications, sleep disturbance, and pain  Avoid deliriogenic medications such as tramadol, benzodiazepines, anticholinergics, and Benadryl  Limit interruptions at night as medically appropriate  Provide quiet environment, dim lighting, and avoidance tv at night  Patient continues to require assistance in which supportive services can be provided by VNA services  Encourage adequate nutrition and hydration  Continue to monitor for acute changes in condition  Follow up with PCP      Ambulatory dysfunction  History of falls  Maintain fall and safety precautions  Encourage use of asst devices  Continue PT/OT services with VNA  Assess for need with assistance with transfers, mobility, and ADLs in/out of home  Patient is at high risk for falls r/t short-term memory loss, s/p lap surgery, anemia, DM, generalized weakness, environmental barriers, and age  Continue to monitor for acute changes in condition   Follow up with PCP         Encounter provider ZHANNA Beach    The patient was identified by name  "and date of birth. Jay Roach Jr. was informed that this is a telemedicine visit and that the visit is being conducted through the [employer] Teams platform. He agrees to proceed..  My office door was closed. No one else was in the room.  He acknowledged consent and understanding of privacy and security of the video platform. The patient has agreed to participate and understands they can discontinue the visit at any time.    Patient is aware this is a billable service.     History of Present Illness        Jay Roach Jr. is a 79 y.o. male patient, being seen for [facility] in conjuction with A nurse Jessica Padron, who was in the home to perform physical assessment.      The patient is being seen and examined today for follow-up on acute and chronic medical conditions s/p most recent hospitalization.     The patient reports he is feeling okay but generally fatigued since returning home. Patient denies any increase in dyspnea, chest pain, fever, chills, dizziness, lightheadedness and RLE edema. Patient reports his appetite has been fair. He denies any problems with urination or bowel movements, but does report he continues with black tarry stools. He reports that he was taking Iron supplement daily and then switched it to EOD, but then stopped taking over the past week. I did review most recent labs with patient.  Patient has concerns with his Hgb and history of GI bleed and states that he has not heard from any of his doctors regarding low Hgb. I educated patient that Hgb is on the \"low side\" but still stable per his recent trends. I made him aware that I will place order for CBC and Iron panel next week, but he should resume taking his Iron supplement. He was also educated on recent surgery and ABLA and that everybody recovers differently. Patient has f/u with Nephrology 2/6, Vascular 2/19, and General Surgery 2/20.         Review of Systems   Constitutional:  Positive for activity change " and fatigue. Negative for chills and fever.   HENT:  Negative for ear pain and sore throat.    Eyes:  Negative for pain and visual disturbance.   Respiratory:  Positive for shortness of breath. Negative for cough.    Cardiovascular:  Positive for leg swelling. Negative for chest pain and palpitations.   Gastrointestinal:  Negative for abdominal pain and vomiting.        Dark stool   Genitourinary:  Negative for dysuria and hematuria.   Musculoskeletal:  Positive for gait problem. Negative for arthralgias and back pain.   Skin:  Negative for color change and rash.   Neurological:  Positive for weakness. Negative for seizures and syncope.   All other systems reviewed and are negative.    Pertinent Medical History    Patient originally presented to the hospital on 1/6/2025 due to shortness of breath, lower extremity swelling.  Patient has past medical history of chronic heart failure with preserved ejection fraction, CKD 4, type 2 diabetes, CAD, GERD, lung cancer status postradiation, peripheral artery disease, hyperlipidemia family history GI bleed with gastric ulcers, hypertension now presenting with progressively worsening shortness of breath since he was recently discharged on 1/3/2025 following a hospitalization for mesenteric ischemia which she underwent exploratory laparotomy and mesenteric stent placement.  Over the last 2 days patient also noted abdominal distention and lower extremity swelling he denies any chest pain no cough no fever or chills.  Patient was noted to have a BNP of 849 chest x-ray revealing small pleural effusions and patient was initiated on IV Lasix 40 mg twice daily with a repeat echo ordered.  Patient was seen by nephrology bleeding patient's creatinine remained stable around 1.8 they agreed with Lasix plan at that time we will continue to monitor and evaluate electrolytes and volume.  Patient was seen by cardiology who felt patient appeared more euvolemic after having had some IV  diuretics and recommended resumption of oral diuretics blood pressures were at goal.  Patient's creatinine noted to be mildly above baseline at 2.9-2.5.  Echocardiogram revealed an EF of 65% with abnormal diastolic function patient with known history of TAVR.  Patient received a one-time dose of 40 mg of torsemide with a slight increase in renal function therefore torsemide was reduced to 20 mg daily.  After discussion with cardiology it was determined the patient was stable for discharge but will need blood work in the next week and to follow-up with nephrology as an outpatient.  Patient should continue follow-up with cardiology.  Call to schedule follow-up with his PCP within the next week.  Extensive discussion was had with patient in regards to medications case management have consulted visiting nurses for nursing and physical therapy at his home.  Tensive discussion was had with the patient's son.      Objective   Chest xray 1/6 : Small pleural effusions.   Echocardiogram 1/8:   Left Ventricle: Left ventricular cavity size is normal. Wall thickness is mildly increased. There is mild concentric hypertrophy. The left ventricular ejection fraction is 65%. Systolic function is normal. Wall motion is normal. Diastolic function is mildly abnormal, consistent with grade I (abnormal) relaxation.    Right Ventricle: Right ventricular cavity size is normal. Systolic function is normal.    Left Atrium: The atrium is moderately dilated.    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 There is trace regurgitation. There is no evidence of paravalvular regurgitation. The gradient recorded across the prosthetic aortic valve is within the expected range. The aortic valve mean gradient is 7 mmHg. The dimensionless velocity index is 0.51. The aortic valve area is 2.21 cm2.    Mitral Valve: There is mild thickening. There is moderate calcification with moderate chordal involvement. The leaflets  exhibit normal mobility. There is mild annular calcification. There is mild regurgitation.  Covid/flu/ RSV on 1/6 negative     1/17/2025  7:39 PM    Component Value Flag Ref Range Units Status   Sodium 140  135 - 147 mmol/L Final   Potassium 4.3  3.5 - 5.3 mmol/L Final   Chloride 102  96 - 108 mmol/L Final   CO2 31  21 - 32 mmol/L Final   ANION GAP 7  4 - 13 mmol/L Final   BUN 44  High  5 - 25 mg/dL Final   Creatinine 2.76  High  0.60 - 1.30 mg/dL Final   Comment:   Standardized to IDMS reference method   Glucose, Fasting 124  High  65 - 99 mg/dL Final   Calcium 9.2  8.4 - 10.2 mg/dL Final   eGFR 20   ml/min/1.73sq m Final   1/11/2025  6:15 AM    Component Value Flag Ref Range Units Status   WBC 5.78  4.31 - 10.16 Thousand/uL Final   RBC 2.49  Low  3.88 - 5.62 Million/uL Final   Hemoglobin 7.9  Low  12.0 - 17.0 g/dL Final   Hematocrit 24.3  Low  36.5 - 49.3 % Final   MCV 98  82 - 98 fL Final   MCH 31.7  26.8 - 34.3 pg Final   MCHC 32.5  31.4 - 37.4 g/dL Final   RDW 16.3  High  11.6 - 15.1 % Final   MPV 10.7  8.9 - 12.7 fL Final   Platelets 189  149 - 390 Thousands/uL Final   nRBC 0   /100 WBCs Final   Segmented % 72  43 - 75 % Final   Immature Grans % 0  0 - 2 % Final   Lymphocytes % 17  14 - 44 % Final   Monocytes % 9  4 - 12 % Final   Eosinophils Relative 2  0 - 6 % Final   Basophils Relative 0  0 - 1 % Final   Absolute Neutrophils 4.13  1.85 - 7.62 Thousands/µL Final   Absolute Immature Grans 0.02  0.00 - 0.20 Thousand/uL Final   Absolute Lymphocytes 0.96  0.60 - 4.47 Thousands/µL Final   Absolute Monocytes 0.52  0.17 - 1.22 Thousand/µL Final   Eosinophils Absolute 0.13  0.00 - 0.61 Thousand/µL Final   Basophils Absolute 0.02  0.00 - 0.10 Thousands/µL Final         Vitals    Vitals 1/24/2025 11:10 AM   /50   BP Location Left arm   Patient Position Sitting   Cuff Size Adult   Resp 16   SpO2 97 %   SpO2 Activity At Rest   Pulse (!) 50   Temp 98.8 °F (37.1 °C)   Temp src Temporal   === Audit [place]  ===  BP  Instant Taken Value File Time User   1/24/2025 11:10 /50 1/24/2025  3:16 PM Jessica Padron RN    124/52 1/24/2025  1:54 PM Jessica Padron RN   Stated Weight    72.1 kg (159 lb)    Physical Exam  Vitals and nursing note reviewed.   Constitutional:       General: He is not in acute distress.     Appearance: He is well-developed.   HENT:      Head: Normocephalic and atraumatic.   Eyes:      Conjunctiva/sclera: Conjunctivae normal.      Comments: Wears glasses   Cardiovascular:      Rate and Rhythm: Normal rate and regular rhythm.      Heart sounds: No murmur heard.  Pulmonary:      Effort: Pulmonary effort is normal. No respiratory distress.      Breath sounds: Normal breath sounds.   Abdominal:      Palpations: Abdomen is soft.      Tenderness: There is no abdominal tenderness.   Musculoskeletal:         General: No swelling.      Cervical back: Neck supple.      Right lower leg: Edema present.   Skin:     General: Skin is warm and dry.      Capillary Refill: Capillary refill takes less than 2 seconds.   Neurological:      Mental Status: He is alert.      Motor: Weakness present.      Gait: Gait abnormal.   Psychiatric:         Mood and Affect: Mood normal.         Visit Time  Total Visit Duration: I have spent a total time of 67 minutes in caring for this patient on the day of the visit/encounter including Diagnostic results, Prognosis, Risks and benefits of tx options, Instructions for management, Patient and family education, Importance of tx compliance, Risk factor reductions, Impressions, Counseling / Coordination of care, Documenting in the medical record, Reviewing/placing orders in the medical record (including tests, medications, and/or procedures), Obtaining or reviewing history  , and Communicating with other healthcare professionals .

## 2025-01-24 NOTE — PROGRESS NOTES
Outpatient Care Management Note:    Voice mail message received from Jay requesting a call back.     Voice mail message left for Jay, with my contact information, requesting a call back.

## 2025-01-27 ENCOUNTER — HOME CARE VISIT (OUTPATIENT)
Dept: HOME HEALTH SERVICES | Facility: HOME HEALTHCARE | Age: 80
End: 2025-01-27
Payer: MEDICARE

## 2025-01-27 ENCOUNTER — PATIENT OUTREACH (OUTPATIENT)
Dept: CASE MANAGEMENT | Facility: OTHER | Age: 80
End: 2025-01-27

## 2025-01-27 ENCOUNTER — NURSE TRIAGE (OUTPATIENT)
Age: 80
End: 2025-01-27

## 2025-01-27 PROCEDURE — G0156 HHCP-SVS OF AIDE,EA 15 MIN: HCPCS

## 2025-01-27 NOTE — TELEPHONE ENCOUNTER
"Reason for Disposition   Unusual stool color probably from food or medicine    Answer Assessment - Initial Assessment Questions  1. COLOR: \"What color is your stool?\" \"Is that color in part or all of the stool?\" (e.g., black, kristel-colored, green, red)       Black in color since after surgery  12/24  2. ONSET: \"When did you first notice this color change?\"      12/25  3. CAUSE: \"Have you eaten any food or taken any medicine of this color?\" Note: See listing in Background Information section.       No   4. OTHER SYMPTOMS: \"Do you have any other symptoms?\" (e.g., abdomen pain, diarrhea, fever, yellow eyes or skin).      Very fatigue since having surgery   Patient is taking iron supplements every day   Patient hemoglobin is 7.9  Patient stated that labs will be drawn tomorrow mobile lab service.   Patient is concerned regarding the low hemoglobin readings.   Patient denies abd.pain ,diarrhea,fever .yellow eyes or skin.    Protocols used: Stools - Unusual Color-Adult-[place]    "

## 2025-01-27 NOTE — LETTER
Date: 01/27/25    Dear Jay Roach Jr.,   My name is Ana Conti.  I am a registered nurse care manager working with  [address] [address] [phone].   I have not been able to reach you and would like to set a time that I can talk with you over the phone.  My work is to help patients that have complex medical conditions get the care they need. This includes patients who may have been in the hospital or emergency room.     Please call me with any questions you may have. I look forward to speaking with you.  Sincerely,  Ana Francoisdana  [phone]  Outpatient Care Manager  Copy:  (primary care physician name and address)

## 2025-01-27 NOTE — PROGRESS NOTES
Outpatient Care Management Note:    Voice mail message left for Jay, with my contact information, attempting to call him back X 2.     Unable to reach letter sent via mail.

## 2025-01-28 ENCOUNTER — HOME CARE VISIT (OUTPATIENT)
Dept: HOME HEALTH SERVICES | Facility: HOME HEALTHCARE | Age: 80
End: 2025-01-28
Payer: MEDICARE

## 2025-01-28 ENCOUNTER — TELEPHONE (OUTPATIENT)
Dept: FAMILY MEDICINE CLINIC | Facility: CLINIC | Age: 80
End: 2025-01-28

## 2025-01-28 ENCOUNTER — TELEMEDICINE (OUTPATIENT)
Dept: GERIATRICS | Facility: OTHER | Age: 80
End: 2025-01-28
Payer: MEDICARE

## 2025-01-28 ENCOUNTER — TELEPHONE (OUTPATIENT)
Dept: NEPHROLOGY | Facility: CLINIC | Age: 80
End: 2025-01-28

## 2025-01-28 VITALS
SYSTOLIC BLOOD PRESSURE: 142 MMHG | OXYGEN SATURATION: 95 % | HEART RATE: 65 BPM | DIASTOLIC BLOOD PRESSURE: 56 MMHG | RESPIRATION RATE: 22 BRPM

## 2025-01-28 VITALS
OXYGEN SATURATION: 96 % | DIASTOLIC BLOOD PRESSURE: 58 MMHG | SYSTOLIC BLOOD PRESSURE: 138 MMHG | HEART RATE: 50 BPM | TEMPERATURE: 98.5 F | RESPIRATION RATE: 17 BRPM

## 2025-01-28 DIAGNOSIS — I50.33 ACUTE ON CHRONIC HEART FAILURE WITH PRESERVED EJECTION FRACTION (HFPEF) (HCC): Primary | ICD-10-CM

## 2025-01-28 DIAGNOSIS — N18.30 BENIGN HYPERTENSION WITH CHRONIC KIDNEY DISEASE, STAGE III (HCC): ICD-10-CM

## 2025-01-28 DIAGNOSIS — I50.33 ACUTE ON CHRONIC HEART FAILURE WITH PRESERVED EJECTION FRACTION (HFPEF) (HCC): ICD-10-CM

## 2025-01-28 DIAGNOSIS — R26.2 AMBULATORY DYSFUNCTION: ICD-10-CM

## 2025-01-28 DIAGNOSIS — I50.9 CHF EXACERBATION (HCC): ICD-10-CM

## 2025-01-28 DIAGNOSIS — R41.3 SHORT-TERM MEMORY LOSS: ICD-10-CM

## 2025-01-28 DIAGNOSIS — R54 FRAILTY SYNDROME IN GERIATRIC PATIENT: ICD-10-CM

## 2025-01-28 DIAGNOSIS — Z87.19 HISTORY OF GI BLEED: ICD-10-CM

## 2025-01-28 DIAGNOSIS — I25.10 CORONARY ARTERY DISEASE INVOLVING NATIVE CORONARY ARTERY OF NATIVE HEART WITHOUT ANGINA PECTORIS: ICD-10-CM

## 2025-01-28 DIAGNOSIS — N18.4 CKD (CHRONIC KIDNEY DISEASE) STAGE 4, GFR 15-29 ML/MIN (HCC): ICD-10-CM

## 2025-01-28 DIAGNOSIS — K55.9 MESENTERIC ISCHEMIA (HCC): ICD-10-CM

## 2025-01-28 DIAGNOSIS — K21.9 GASTROESOPHAGEAL REFLUX DISEASE WITHOUT ESOPHAGITIS: ICD-10-CM

## 2025-01-28 DIAGNOSIS — I12.9 BENIGN HYPERTENSION WITH CHRONIC KIDNEY DISEASE, STAGE III (HCC): ICD-10-CM

## 2025-01-28 PROCEDURE — 99215 OFFICE O/P EST HI 40 MIN: CPT | Performed by: NURSE PRACTITIONER

## 2025-01-28 PROCEDURE — G0299 HHS/HOSPICE OF RN EA 15 MIN: HCPCS

## 2025-01-28 PROCEDURE — G0152 HHCP-SERV OF OT,EA 15 MIN: HCPCS

## 2025-01-28 RX ORDER — LANOLIN ALCOHOL/MO/W.PET/CERES
400 CREAM (GRAM) TOPICAL 2 TIMES DAILY
Qty: 180 TABLET | Refills: 1 | Status: SHIPPED | OUTPATIENT
Start: 2025-01-28

## 2025-01-28 RX ORDER — METOPROLOL TARTRATE 25 MG/1
12.5 TABLET, FILM COATED ORAL EVERY 12 HOURS SCHEDULED
Qty: 45 TABLET | Refills: 1 | Status: SHIPPED | OUTPATIENT
Start: 2025-01-28 | End: 2025-02-27

## 2025-01-28 RX ORDER — POTASSIUM CHLORIDE 1500 MG/1
20 TABLET, EXTENDED RELEASE ORAL DAILY
Qty: 90 TABLET | Refills: 1 | Status: SHIPPED | OUTPATIENT
Start: 2025-01-28

## 2025-01-28 NOTE — TELEPHONE ENCOUNTER
Jay Quan    Patient received medication from the ED at [facility], would the PCP refill the medication for the patient.    metoprolol tartrate (LOPRESSOR) 25 mg tablet   magnesium Oxide (MAG-OX) 400 mg TABS   potassium chloride (Klor-Con M20) 20 mEq tablet     Pharmacy:  [facility]

## 2025-01-28 NOTE — PROGRESS NOTES
Virtual Regular Visit  Name: Jay Roach Jr.      : 1945      MRN: [id number]  Encounter Provider: ZHANNA Beach  Encounter Date: 2025   Encounter department: [facility] VIRTUAL      Verification of patient location:  Patient is located at Home in the following state in which I hold an active license [place] :  Assessment & Plan  Acute on chronic heart failure with preserved ejection fraction (HFpEF) ([facility])  Most recent Echo with LVEF 65%  Current wt 158 lbs  Continue to monitor weights  Continue to monitor BMP   Continue Torsemide 20 mg daily  Continue JAKOB diet  Continue to monitor for s/s of fluid overload  Follow up with PCP/Cardiology                      Coronary artery disease involving native coronary artery of native heart without angina pectoris  Patient denies angina  Continue Plavix, Atorvastatin, and metoprolol  Continue to monitor for acute changes in condition  Follow up with PCP         Benign hypertension with chronic kidney disease, stage III ([facility])  BP remains stable today, 138/58  Continue Amlodipine, Hydralazine, Metoprolol and Torsemide  Avoid hypotension  Continue to monitor BP and for acute changes in condition  Follow up with PCP/Cardiology            Gastroesophageal reflux disease without esophagitis  Continue Protonix 40 mg BID 30 min prior to meals  Avoid citrus, spicy, caffeine, chocolate, and trigger foods  Avoid eating/drinking 1 hour prior to laying down  Continue to monitor for acute changes in condition  Follow up with PCP          History of GI bleed  History of gastric ulcers s/p clipping  Patient reports continued dark stools  Continue protonix 40 mg BID  Follow up with GI/PCP         Short-term memory loss  Patient with short-term memory los  Continue supportive measures  Continue to reorient, redirect, and reassure patient  Encourage engagement and activity  Encourage daily involvement in ADLs  Maintain delirium precautions and  sleep/wake cycle  Patient remains at risk for delirium r/t age, medications, sleep disturbance, and pain  Avoid deliriogenic medications such as tramadol, benzodiazepines, anticholinergics, and Benadryl  Limit interruptions at night as medically appropriate  Provide quiet environment, dim lighting, and avoidance tv at night  Patient continues to require assistance in which supportive services can be provided by VNA services  Encourage adequate nutrition and hydration  Continue to monitor for acute changes in condition  Follow up with PCP             Frailty syndrome in geriatric patient  Patient with multiple comorbid conditions with recent hospital admission requiring medical management of acute and chronic medical conditions  Patient continues to require assistance in which VNA/HH supportive services can be provided  Maintain fall and safety precautions  Continue PT/OT svcs with VNA/HH  Will assist in medication education and disease management  Continue to monitor patient for acute changes in condition  Follow up with all scheduled outpatient appts          Ambulatory dysfunction  History of falls  Maintain fall and safety precautions  Encourage use of asst devices  Continue PT/OT services with VNA  Assess for need with assistance with transfers, mobility, and ADLs in/out of home  Patient is at high risk for falls r/t short-term memory loss, s/p lap surgery, anemia, DM, generalized weakness, environmental barriers, and age  Continue to monitor for acute changes in condition   Follow up with PCP                Encounter provider ZHANNA Beach    The patient was identified by name and date of birth. Jay Roach Jr. was informed that this is a telemedicine visit and that the visit is being conducted through the [employer] Teams platform. He agrees to proceed..  My office door was closed. No one else was in the room.  He acknowledged consent and understanding of privacy and security of the  video platform. The patient has agreed to participate and understands they can discontinue the visit at any time.    Patient is aware this is a billable service.     History of Present Illness        Jay Roach Jr. is a 79 y.o. male patient, being seen for Heal at home program in conjuction with A nurse Jessica Padron, who was in the home to perform physical assessment.      The patient is being seen and examined today for follow-up on acute and chronic medical conditions s/p most recent hospitalization.     The patient reports he is overall feeling well today with less fatigue. He reports he continues to have dark, tarry stools and constipation.  He did resume taking Iron supplement and I also encouraged him to take colace daily r/t his constipation. Patient will have labs obtained on 1/31. He denies any increase in dypnea. dizziness, fever, chills, melena, or nausea. VNA nurse reports that HR remains low at 50 bpm and she did reach out to Cardiology to make them aware, but they have not changed any medications. Weight remains stable at 158 lbs with no edema noted. Nephrology 2/6, Vascular 2/19, and General Surgery 2/20.             Review of Systems   Constitutional:  Positive for activity change and fatigue. Negative for chills and fever.   HENT:  Negative for ear pain and sore throat.    Eyes:  Negative for pain and visual disturbance.   Respiratory:  Positive for shortness of breath. Negative for cough.    Cardiovascular:  Negative for chest pain and palpitations.   Gastrointestinal:  Positive for constipation. Negative for abdominal pain and vomiting.        Dark stools   Genitourinary:  Negative for dysuria and hematuria.   Musculoskeletal:  Positive for gait problem. Negative for arthralgias and back pain.   Skin:  Negative for color change and rash.   Neurological:  Positive for weakness. Negative for seizures and syncope.   All other systems reviewed and are negative.    Pertinent Medical History     Patient originally presented to the hospital on 1/6/2025 due to shortness of breath, lower extremity swelling.  Patient has past medical history of chronic heart failure with preserved ejection fraction, CKD 4, type 2 diabetes, CAD, GERD, lung cancer status postradiation, peripheral artery disease, hyperlipidemia family history GI bleed with gastric ulcers, hypertension now presenting with progressively worsening shortness of breath since he was recently discharged on 1/3/2025 following a hospitalization for mesenteric ischemia which she underwent exploratory laparotomy and mesenteric stent placement.  Over the last 2 days patient also noted abdominal distention and lower extremity swelling he denies any chest pain no cough no fever or chills.  Patient was noted to have a BNP of 849 chest x-ray revealing small pleural effusions and patient was initiated on IV Lasix 40 mg twice daily with a repeat echo ordered.  Patient was seen by nephrology bleeding patient's creatinine remained stable around 1.8 they agreed with Lasix plan at that time we will continue to monitor and evaluate electrolytes and volume.  Patient was seen by cardiology who felt patient appeared more euvolemic after having had some IV diuretics and recommended resumption of oral diuretics blood pressures were at goal.  Patient's creatinine noted to be mildly above baseline at 2.9-2.5.  Echocardiogram revealed an EF of 65% with abnormal diastolic function patient with known history of TAVR.  Patient received a one-time dose of 40 mg of torsemide with a slight increase in renal function therefore torsemide was reduced to 20 mg daily.  After discussion with cardiology it was determined the patient was stable for discharge but will need blood work in the next week and to follow-up with nephrology as an outpatient.  Patient should continue follow-up with cardiology.  Call to schedule follow-up with his PCP within the next week.  Extensive discussion was  had with patient in regards to medications case management have consulted visiting nurses for nursing and physical therapy at his home.  Tensive discussion was had with the patient's son.      Objective   Vitals    Vitals 1/28/2025 10:21 AM   /58   BP Location Left arm   Patient Position Sitting   Cuff Size Adult   Resp 17   SpO2 96 %   SpO2 Activity At Rest   Pulse (!) 50   Temp 98.5 °F (36.9 °C)   Temp src Temporal   Stated Weight    71.9 kg (158 lb 9.6 oz)    Physical Exam  Vitals and nursing note reviewed.   Constitutional:       General: He is not in acute distress.     Appearance: He is well-developed.   HENT:      Head: Normocephalic and atraumatic.   Eyes:      Conjunctiva/sclera: Conjunctivae normal.      Comments: Wears glasses   Cardiovascular:      Rate and Rhythm: Normal rate and regular rhythm.      Heart sounds: No murmur heard.  Pulmonary:      Effort: Pulmonary effort is normal. No respiratory distress.      Breath sounds: Normal breath sounds.   Abdominal:      Palpations: Abdomen is soft.      Tenderness: There is no abdominal tenderness.   Musculoskeletal:         General: No swelling.      Cervical back: Neck supple.   Skin:     General: Skin is warm and dry.      Capillary Refill: Capillary refill takes less than 2 seconds.   Neurological:      Mental Status: He is alert.      Motor: Weakness present.      Gait: Gait abnormal.   Psychiatric:         Mood and Affect: Mood normal.         Visit Time  Total Visit Duration: I have spent a total time of 48 minutes in caring for this patient on the day of the visit/encounter including Prognosis, Risks and benefits of tx options, Instructions for management, Patient and family education, Importance of tx compliance, Risk factor reductions, Counseling / Coordination of care, Documenting in the medical record, Reviewing/placing orders in the medical record (including tests, medications, and/or procedures), Obtaining or reviewing history  , and  Communicating with other healthcare professionals .

## 2025-01-28 NOTE — TELEPHONE ENCOUNTER
LM for pt to r/s 2/6 appt with Nelida due to provider being OOO -  can see JANNETH or Dr. Muñoz. Please transfer call to EO for assistance

## 2025-01-29 ENCOUNTER — HOME CARE VISIT (OUTPATIENT)
Dept: HOME HEALTH SERVICES | Facility: HOME HEALTHCARE | Age: 80
End: 2025-01-29
Payer: MEDICARE

## 2025-01-29 PROCEDURE — G0156 HHCP-SVS OF AIDE,EA 15 MIN: HCPCS

## 2025-01-31 ENCOUNTER — HOME CARE VISIT (OUTPATIENT)
Dept: HOME HEALTH SERVICES | Facility: HOME HEALTHCARE | Age: 80
End: 2025-01-31
Payer: MEDICARE

## 2025-01-31 VITALS
RESPIRATION RATE: 18 BRPM | DIASTOLIC BLOOD PRESSURE: 52 MMHG | BODY MASS INDEX: 22.64 KG/M2 | TEMPERATURE: 99.4 F | WEIGHT: 157.8 LBS | SYSTOLIC BLOOD PRESSURE: 124 MMHG | OXYGEN SATURATION: 97 % | HEART RATE: 61 BPM

## 2025-01-31 PROCEDURE — G0299 HHS/HOSPICE OF RN EA 15 MIN: HCPCS

## 2025-01-31 NOTE — HOME HEALTH
Patient forgot he was having bloodwork this morning and ate breakfast.  Patient indicated he will go to the [facility] tomorrow first thing in the morning,    Priya Padron RN

## 2025-02-01 ENCOUNTER — APPOINTMENT (OUTPATIENT)
Dept: LAB | Facility: MEDICAL CENTER | Age: 80
End: 2025-02-01
Payer: MEDICARE

## 2025-02-01 DIAGNOSIS — I50.9 CHF EXACERBATION (HCC): ICD-10-CM

## 2025-02-01 DIAGNOSIS — D62 ACUTE BLOOD LOSS ANEMIA: ICD-10-CM

## 2025-02-01 DIAGNOSIS — I50.33 ACUTE ON CHRONIC HEART FAILURE WITH PRESERVED EJECTION FRACTION (HFPEF) (HCC): ICD-10-CM

## 2025-02-01 DIAGNOSIS — N18.4 CKD (CHRONIC KIDNEY DISEASE) STAGE 4, GFR 15-29 ML/MIN (HCC): ICD-10-CM

## 2025-02-01 LAB
ALBUMIN SERPL BCG-MCNC: 3.6 G/DL (ref 3.5–5)
ANION GAP SERPL CALCULATED.3IONS-SCNC: 6 MMOL/L (ref 4–13)
BUN SERPL-MCNC: 55 MG/DL (ref 5–25)
CALCIUM SERPL-MCNC: 9.1 MG/DL (ref 8.4–10.2)
CHLORIDE SERPL-SCNC: 105 MMOL/L (ref 96–108)
CO2 SERPL-SCNC: 31 MMOL/L (ref 21–32)
CREAT SERPL-MCNC: 2.91 MG/DL (ref 0.6–1.3)
FERRITIN SERPL-MCNC: 40 NG/ML (ref 24–336)
GFR SERPL CREATININE-BSD FRML MDRD: 19 ML/MIN/1.73SQ M
GLUCOSE SERPL-MCNC: 123 MG/DL (ref 65–140)
IRON SATN MFR SERPL: 17 % (ref 15–50)
IRON SERPL-MCNC: 52 UG/DL (ref 50–212)
PHOSPHATE SERPL-MCNC: 4.4 MG/DL (ref 2.3–4.1)
POTASSIUM SERPL-SCNC: 4.5 MMOL/L (ref 3.5–5.3)
SODIUM SERPL-SCNC: 142 MMOL/L (ref 135–147)
TIBC SERPL-MCNC: 298.2 UG/DL (ref 250–450)
TRANSFERRIN SERPL-MCNC: 213 MG/DL (ref 203–362)
UIBC SERPL-MCNC: 246 UG/DL (ref 155–355)

## 2025-02-01 PROCEDURE — 82728 ASSAY OF FERRITIN: CPT

## 2025-02-01 PROCEDURE — 83550 IRON BINDING TEST: CPT

## 2025-02-01 PROCEDURE — 80069 RENAL FUNCTION PANEL: CPT

## 2025-02-01 PROCEDURE — 36415 COLL VENOUS BLD VENIPUNCTURE: CPT

## 2025-02-01 PROCEDURE — 83540 ASSAY OF IRON: CPT

## 2025-02-03 ENCOUNTER — RESULTS FOLLOW-UP (OUTPATIENT)
Dept: NEPHROLOGY | Facility: CLINIC | Age: 80
End: 2025-02-03

## 2025-02-03 ENCOUNTER — HOME CARE VISIT (OUTPATIENT)
Dept: HOME HEALTH SERVICES | Facility: HOME HEALTHCARE | Age: 80
End: 2025-02-03
Payer: MEDICARE

## 2025-02-03 ENCOUNTER — TELEMEDICINE (OUTPATIENT)
Dept: GERIATRICS | Facility: OTHER | Age: 80
End: 2025-02-03
Payer: MEDICARE

## 2025-02-03 VITALS
TEMPERATURE: 98.9 F | OXYGEN SATURATION: 99 % | DIASTOLIC BLOOD PRESSURE: 64 MMHG | WEIGHT: 157.8 LBS | SYSTOLIC BLOOD PRESSURE: 122 MMHG | BODY MASS INDEX: 22.64 KG/M2 | HEART RATE: 49 BPM | RESPIRATION RATE: 18 BRPM

## 2025-02-03 DIAGNOSIS — I25.10 CORONARY ARTERY DISEASE INVOLVING NATIVE CORONARY ARTERY OF NATIVE HEART WITHOUT ANGINA PECTORIS: ICD-10-CM

## 2025-02-03 DIAGNOSIS — D62 ACUTE BLOOD LOSS ANEMIA: ICD-10-CM

## 2025-02-03 DIAGNOSIS — R80.1 PERSISTENT PROTEINURIA, UNSPECIFIED: ICD-10-CM

## 2025-02-03 DIAGNOSIS — I50.33 ACUTE ON CHRONIC HEART FAILURE WITH PRESERVED EJECTION FRACTION (HFPEF) (HCC): ICD-10-CM

## 2025-02-03 DIAGNOSIS — D63.1 ANEMIA DUE TO STAGE 4 CHRONIC KIDNEY DISEASE  (HCC): Primary | ICD-10-CM

## 2025-02-03 DIAGNOSIS — N18.4 BENIGN HYPERTENSION WITH CKD (CHRONIC KIDNEY DISEASE) STAGE IV (HCC): ICD-10-CM

## 2025-02-03 DIAGNOSIS — R26.2 AMBULATORY DYSFUNCTION: ICD-10-CM

## 2025-02-03 DIAGNOSIS — N18.4 CKD (CHRONIC KIDNEY DISEASE) STAGE 4, GFR 15-29 ML/MIN (HCC): ICD-10-CM

## 2025-02-03 DIAGNOSIS — N18.30 BENIGN HYPERTENSION WITH CHRONIC KIDNEY DISEASE, STAGE III (HCC): ICD-10-CM

## 2025-02-03 DIAGNOSIS — I12.9 BENIGN HYPERTENSION WITH CHRONIC KIDNEY DISEASE, STAGE III (HCC): ICD-10-CM

## 2025-02-03 DIAGNOSIS — I12.9 BENIGN HYPERTENSION WITH CKD (CHRONIC KIDNEY DISEASE) STAGE IV (HCC): ICD-10-CM

## 2025-02-03 DIAGNOSIS — N18.4 ANEMIA DUE TO STAGE 4 CHRONIC KIDNEY DISEASE  (HCC): Primary | ICD-10-CM

## 2025-02-03 DIAGNOSIS — N18.4 CKD (CHRONIC KIDNEY DISEASE) STAGE 4, GFR 15-29 ML/MIN (HCC): Primary | ICD-10-CM

## 2025-02-03 DIAGNOSIS — R41.3 SHORT-TERM MEMORY LOSS: ICD-10-CM

## 2025-02-03 PROCEDURE — G0299 HHS/HOSPICE OF RN EA 15 MIN: HCPCS

## 2025-02-03 PROCEDURE — 99215 OFFICE O/P EST HI 40 MIN: CPT | Performed by: NURSE PRACTITIONER

## 2025-02-03 NOTE — RESULT ENCOUNTER NOTE
Lm for Jay to increase fluids to 2.5 quarts daily. Keep log of weights and watch for any changes. BMP in epic due in 1 week if cr still elevated then will make changes to his diuretic.   Advised if patient has any questions to call office back .

## 2025-02-03 NOTE — PROGRESS NOTES
Virtual Regular Visit  Name: Jay Roach Jr.      : 1945      MRN: [id number]  Encounter Provider: ZHANNA Beach  Encounter Date: 2/3/2025   Encounter department: [facility] VIRTUAL      Verification of patient location:  Patient is located at Home in the following state in which I hold an active license [place] :  Assessment & Plan  Anemia due to stage 4 chronic kidney disease  ([facility])  Most recent Hgb 9.1/Hct 29.9, improved  Continue to monitor CBC periodically  Monitor for signs and symptoms of bleeding  Continue to monitor patient for acute changes in condition  Follow up with PCP   Orders:    CBC and differential; Future    Acute blood loss anemia    Orders:    CBC and differential; Future    CKD (chronic kidney disease) stage 4, GFR 15-29 ml/min ([facility])  Most recent Creatinine 2.91/Gfr 19, slightly elevated  Continue to monitor BMP periodically  Avoid nephrotoxins and NSAIDS  Avoid hypotension  Continue to monitor for acute changes in condition  Follow up with PCP     Orders:    Basic metabolic panel; Future    Acute on chronic heart failure with preserved ejection fraction (HFpEF) ([facility])  Most recent Echo with LVEF 65%  Current wt 158 lbs  Continue to monitor weights  Continue to monitor BMP   Continue Torsemide 20 mg daily  Continue JAKOB diet  Continue to monitor for s/s of fluid overload  Follow up with PCP/Cardiology         Coronary artery disease involving native coronary artery of native heart without angina pectoris  Patient denies angina  Continue Plavix, Atorvastatin, and metoprolol  Continue to monitor for acute changes in condition  Follow up with PCP      Benign hypertension with chronic kidney disease, stage III ([facility])  BP remains stable today, 122/64  Continue Amlodipine, Hydralazine, Metoprolol and Torsemide  Avoid hypotension  Continue to monitor BP and for acute changes in condition  Follow up with PCP/Cardiology       Short-term memory loss  Patient with  short-term memory los  Continue supportive measures  Continue to reorient, redirect, and reassure patient  Encourage engagement and activity  Encourage daily involvement in ADLs  Maintain delirium precautions and sleep/wake cycle  Patient remains at risk for delirium r/t age, medications, sleep disturbance, and pain  Avoid deliriogenic medications such as tramadol, benzodiazepines, anticholinergics, and Benadryl  Limit interruptions at night as medically appropriate  Provide quiet environment, dim lighting, and avoidance tv at night  Patient continues to require assistance in which supportive services can be provided by VNA services  Encourage adequate nutrition and hydration  Continue to monitor for acute changes in condition  Follow up with PCP        Ambulatory dysfunction  History of falls  Maintain fall and safety precautions  Encourage use of asst devices  Continue PT/OT services with VNA  Assess for need with assistance with transfers, mobility, and ADLs in/out of home  Patient is at high risk for falls r/t short-term memory loss, s/p lap surgery, anemia, DM, generalized weakness, environmental barriers, and age  Continue to monitor for acute changes in condition   Follow up with PCP         Encounter provider ZHANNA Beach    The patient was identified by name and date of birth. Jay SATNAM Roach Jr. was informed that this is a telemedicine visit and that the visit is being conducted through the [employer] Teams platform. He agrees to proceed.  My office door was closed. No one else was in the room.  He acknowledged consent and understanding of privacy and security of the video platform. The patient has agreed to participate and understands they can discontinue the visit at any time.    Patient is aware this is a billable service.     History of Present Illness        Jay SATNAM Roach Jr. is a 79 y.o. male patient, being seen for Heal at home program in conjuction with VNA nurse Priya  Vito, who was in the home to perform physical assessment.      The patient is being seen and examined today for follow-up on acute and chronic medical conditions s/p most recent hospitalization.     The patient reports he is feeling okay today other than some fatigue. He states that his weight has remained steady at 157.8 lbs and bgs have also been stable with FBS this morning 105. I reviewed most recent labs with patient and reported that Iron panel was with normal levels, Hgb is up to 9.1, but Cr is slightly up 2.91. Patient reports he was placed on fluid restriction of 1800 mL daily but is likely not drinking this much.  I educated patient on importance of trying to get 1800 mL of fluid in daily as he appears dehydrated per labs. I also made him aware that message from Dr. Dickinson, would like repeat of labs in 1 week.  Patient reports he continues with black stools but no constipation since starting colace daily. [facility] our services today, as he remains stable at this time.             Review of Systems   Constitutional:  Positive for activity change and fatigue. Negative for chills and fever.   HENT:  Negative for ear pain and sore throat.    Eyes:  Negative for pain and visual disturbance.   Respiratory:  Positive for shortness of breath. Negative for cough.    Cardiovascular:  Negative for chest pain and palpitations.   Gastrointestinal:  Negative for abdominal pain and vomiting.        Dark stools   Genitourinary:  Negative for dysuria and hematuria.   Musculoskeletal:  Positive for gait problem. Negative for arthralgias and back pain.   Skin:  Negative for color change and rash.   Neurological:  Positive for weakness. Negative for seizures and syncope.   All other systems reviewed and are negative.      Pertinent Medical History    Patient originally presented to the hospital on 1/6/2025 due to shortness of breath, lower extremity swelling.  Patient has past medical history  of chronic heart failure with preserved ejection fraction, CKD 4, type 2 diabetes, CAD, GERD, lung cancer status postradiation, peripheral artery disease, hyperlipidemia family history GI bleed with gastric ulcers, hypertension now presenting with progressively worsening shortness of breath since he was recently discharged on 1/3/2025 following a hospitalization for mesenteric ischemia which she underwent exploratory laparotomy and mesenteric stent placement.  Over the last 2 days patient also noted abdominal distention and lower extremity swelling he denies any chest pain no cough no fever or chills.  Patient was noted to have a BNP of 849 chest x-ray revealing small pleural effusions and patient was initiated on IV Lasix 40 mg twice daily with a repeat echo ordered.  Patient was seen by nephrology bleeding patient's creatinine remained stable around 1.8 they agreed with Lasix plan at that time we will continue to monitor and evaluate electrolytes and volume.  Patient was seen by cardiology who felt patient appeared more euvolemic after having had some IV diuretics and recommended resumption of oral diuretics blood pressures were at goal.  Patient's creatinine noted to be mildly above baseline at 2.9-2.5.  Echocardiogram revealed an EF of 65% with abnormal diastolic function patient with known history of TAVR.  Patient received a one-time dose of 40 mg of torsemide with a slight increase in renal function therefore torsemide was reduced to 20 mg daily.  After discussion with cardiology it was determined the patient was stable for discharge but will need blood work in the next week and to follow-up with nephrology as an outpatient.  Patient should continue follow-up with cardiology.  Call to schedule follow-up with his PCP within the next week.  Extensive discussion was had with patient in regards to medications case management have consulted visiting nurses for nursing and physical therapy at his home.  Anderson  discussion was had with the patient's son.     Objective   Vitals    Vitals 2/3/2025 12:14 PM   /64   BP Location Left arm   Patient Position Sitting   Resp 18   Weight 71.6 kg (157 lb 12.8 oz)   SpO2 99 %   SpO2 Activity At Rest   Pulse (!) 49   Pulse Source Apical   Temp 98.9 °F (37.2 °C)   Temp src Temporal     2/1/2025  7:02 PM    Component Value Flag Ref Range Units Status   Iron Saturation 17  15 - 50 % Final   TIBC 298.2  250 - 450 ug/dL Final   Iron 52  50 - 212 ug/dL Final   Comment:   Patients treated with metal-binding drugs (ie. Deferoxamine) may have depressed iron values.   Transferrin 213  203 - 362 mg/dL Final   UIBC 246  155 - 355 ug/dL Final       2/1/2025  7:02 PM    Component Value Flag Ref Range Units Status   Albumin 3.6  3.5 - 5.0 g/dL Final   Calcium 9.1  8.4 - 10.2 mg/dL Final   Phosphorus 4.4  High  2.3 - 4.1 mg/dL Final   Glucose 123  65 - 140 mg/dL Final   Comment:   If the patient is fasting, the ADA then defines impaired fasting glucose as > 100 mg/dL and diabetes as > or equal to 123 mg/dL.   BUN 55  High  5 - 25 mg/dL Final   Creatinine 2.91  High  0.60 - 1.30 mg/dL Final   Comment:   Standardized to IDMS reference method   Sodium 142  135 - 147 mmol/L Final   Potassium 4.5  3.5 - 5.3 mmol/L Final   Chloride 105  96 - 108 mmol/L Final   CO2 31  21 - 32 mmol/L Final   ANION GAP 6  4 - 13 mmol/L Final   eGFR 19   ml/min/1.73sq m Final       2/1/2025  4:01 PM    Component Value Flag Ref Range Units Status   WBC 5.10  4.31 - 10.16 Thousand/uL Final   RBC 2.97  Low  3.88 - 5.62 Million/uL Final   Hemoglobin 9.1  Low  12.0 - 17.0 g/dL Final   Hematocrit 29.9  Low  36.5 - 49.3 % Final     High  82 - 98 fL Final   MCH 30.6  26.8 - 34.3 pg Final   MCHC 30.4  Low  31.4 - 37.4 g/dL Final   RDW 15.5  High  11.6 - 15.1 % Final   MPV 11.0  8.9 - 12.7 fL Final   Platelets 180  149 - 390 Thousands/uL Final   nRBC 0   /100 WBCs Final   Segmented % 62  43 - 75 % Final   Immature Grans % 0   0 - 2 % Final   Lymphocytes % 28  14 - 44 % Final   Monocytes % 7  4 - 12 % Final   Eosinophils Relative 3  0 - 6 % Final   Basophils Relative 0  0 - 1 % Final   Absolute Neutrophils 3.14  1.85 - 7.62 Thousands/µL Final   Absolute Immature Grans 0.02  0.00 - 0.20 Thousand/uL Final   Absolute Lymphocytes 1.44  0.60 - 4.47 Thousands/µL Final   Absolute Monocytes 0.35  0.17 - 1.22 Thousand/µL Final   Eosinophils Absolute 0.13  0.00 - 0.61 Thousand/µL Final   Basophils Absolute 0.02  0.00 - 0.10 Thousands/µL Final       Physical Exam  Vitals and nursing note reviewed.   Constitutional:       General: He is not in acute distress.     Appearance: He is well-developed.   HENT:      Head: Normocephalic and atraumatic.   Eyes:      Conjunctiva/sclera: Conjunctivae normal.      Comments: Wears glasses   Cardiovascular:      Rate and Rhythm: Normal rate and regular rhythm.      Heart sounds: No murmur heard.  Pulmonary:      Effort: Pulmonary effort is normal. No respiratory distress.      Breath sounds: Normal breath sounds.   Abdominal:      Palpations: Abdomen is soft.      Tenderness: There is no abdominal tenderness.   Musculoskeletal:         General: No swelling.      Cervical back: Neck supple.   Skin:     General: Skin is warm and dry.      Capillary Refill: Capillary refill takes less than 2 seconds.   Neurological:      Mental Status: He is alert.      Motor: Weakness present.      Gait: Gait abnormal.   Psychiatric:         Mood and Affect: Mood normal.         Visit Time  Total Visit Duration: I have spent a total time of 48 minutes in caring for this patient on the day of the visit/encounter including Diagnostic results, Prognosis, Risks and benefits of tx options, Instructions for management, Patient and family education, Importance of tx compliance, Risk factor reductions, Impressions, Counseling / Coordination of care, Documenting in the medical record, Reviewing/placing orders in the medical record (including  tests, medications, and/or procedures), Obtaining or reviewing history  , and Communicating with other healthcare professionals .

## 2025-02-03 NOTE — RESULT ENCOUNTER NOTE
I am not sure why they canceled the follow-up office appointment for 2/6/2025.  Please call the patient and inform them that creatinine is slightly elevated from hospital discharge make sure they are keeping themselves well-hydrated please ask how their home weights are doing and have them repeat blood work in 1 week, if creatinine were to continue to rise then we may need to decrease his torsemide dosage based on last progress notes that I read in the computer, if possible we will try to get him in to see someone in the office in February please

## 2025-02-03 NOTE — TELEPHONE ENCOUNTER
----- Message from Allison Dickinson MD sent at 2/3/2025 10:50 AM EST -----  I am not sure why they canceled the follow-up office appointment for 2/6/2025.  Please call the patient and inform them that creatinine is slightly elevated from hospital discharge make sure they are keeping themselves well-hydrated please ask how their home weights are doing and have them repeat blood work in 1 week, if creatinine were to continue to rise then we may need to decrease his torsemide dosage based on last progress notes that I read in the computer, if possible we will try to get him in to see someone in the office in February please

## 2025-02-04 ENCOUNTER — PATIENT OUTREACH (OUTPATIENT)
Dept: CASE MANAGEMENT | Facility: OTHER | Age: 80
End: 2025-02-04

## 2025-02-04 ENCOUNTER — EPISODE CHANGES (OUTPATIENT)
Dept: GERIATRICS | Facility: OTHER | Age: 80
End: 2025-02-04

## 2025-02-04 NOTE — PROGRESS NOTES
Outpatient Care Management Note:    Voice mail message received from Jay calling back after receiving my unable to reach letter.     CM attempted to call Jay back and got his voice mail.  I suggested he call me back and leave a date and time for me to call, so we don't miss each other.       Call received from Jay. He states that he is have repeat blood work completed on Saturday.  He noted that he was instructed to increase his fluid intake to 2.5 quarts or 80 ounces per day.  He will continue to monitor his weights closely and will call cardiology if he gains by 3/5 pound rule. Today Jay weighed 158 lbs.  He does admit to some shortness of breath with increased exertion but states it resolves quickly with rest. He denies any swelling. He will monitor his symptoms closely and will call cardiology with any worsening symptoms.     Jay states that VNA ended. CM will follow up in 2 weeks.      Jay states that his blood sugars have been stable. Today it was 95 .

## 2025-02-08 ENCOUNTER — APPOINTMENT (OUTPATIENT)
Dept: LAB | Facility: MEDICAL CENTER | Age: 80
End: 2025-02-08
Payer: MEDICARE

## 2025-02-08 DIAGNOSIS — D62 ACUTE BLOOD LOSS ANEMIA: ICD-10-CM

## 2025-02-08 DIAGNOSIS — N18.4 CKD (CHRONIC KIDNEY DISEASE) STAGE 4, GFR 15-29 ML/MIN (HCC): ICD-10-CM

## 2025-02-08 DIAGNOSIS — N18.4 ANEMIA DUE TO STAGE 4 CHRONIC KIDNEY DISEASE  (HCC): ICD-10-CM

## 2025-02-08 DIAGNOSIS — D63.1 ANEMIA DUE TO STAGE 4 CHRONIC KIDNEY DISEASE  (HCC): ICD-10-CM

## 2025-02-08 PROCEDURE — 80048 BASIC METABOLIC PNL TOTAL CA: CPT

## 2025-02-08 PROCEDURE — 36415 COLL VENOUS BLD VENIPUNCTURE: CPT

## 2025-02-08 PROCEDURE — 85025 COMPLETE CBC W/AUTO DIFF WBC: CPT

## 2025-02-09 LAB
ANION GAP SERPL CALCULATED.3IONS-SCNC: 11 MMOL/L (ref 4–13)
BASOPHILS # BLD AUTO: 0.03 THOUSANDS/ΜL (ref 0–0.1)
BASOPHILS NFR BLD AUTO: 1 % (ref 0–1)
BUN SERPL-MCNC: 49 MG/DL (ref 5–25)
CALCIUM SERPL-MCNC: 9.3 MG/DL (ref 8.4–10.2)
CHLORIDE SERPL-SCNC: 106 MMOL/L (ref 96–108)
CO2 SERPL-SCNC: 25 MMOL/L (ref 21–32)
CREAT SERPL-MCNC: 2.78 MG/DL (ref 0.6–1.3)
EOSINOPHIL # BLD AUTO: 0.19 THOUSAND/ΜL (ref 0–0.61)
EOSINOPHIL NFR BLD AUTO: 3 % (ref 0–6)
ERYTHROCYTE [DISTWIDTH] IN BLOOD BY AUTOMATED COUNT: 15.4 % (ref 11.6–15.1)
GFR SERPL CREATININE-BSD FRML MDRD: 20 ML/MIN/1.73SQ M
GLUCOSE SERPL-MCNC: 112 MG/DL (ref 65–140)
HCT VFR BLD AUTO: 29.3 % (ref 36.5–49.3)
HGB BLD-MCNC: 8.9 G/DL (ref 12–17)
IMM GRANULOCYTES # BLD AUTO: 0.01 THOUSAND/UL (ref 0–0.2)
IMM GRANULOCYTES NFR BLD AUTO: 0 % (ref 0–2)
LYMPHOCYTES # BLD AUTO: 1.28 THOUSANDS/ΜL (ref 0.6–4.47)
LYMPHOCYTES NFR BLD AUTO: 23 % (ref 14–44)
MCH RBC QN AUTO: 31.2 PG (ref 26.8–34.3)
MCHC RBC AUTO-ENTMCNC: 30.4 G/DL (ref 31.4–37.4)
MCV RBC AUTO: 103 FL (ref 82–98)
MONOCYTES # BLD AUTO: 0.44 THOUSAND/ΜL (ref 0.17–1.22)
MONOCYTES NFR BLD AUTO: 8 % (ref 4–12)
NEUTROPHILS # BLD AUTO: 3.62 THOUSANDS/ΜL (ref 1.85–7.62)
NEUTS SEG NFR BLD AUTO: 65 % (ref 43–75)
NRBC BLD AUTO-RTO: 0 /100 WBCS
PLATELET # BLD AUTO: 165 THOUSANDS/UL (ref 149–390)
PMV BLD AUTO: 11.3 FL (ref 8.9–12.7)
POTASSIUM SERPL-SCNC: 4.8 MMOL/L (ref 3.5–5.3)
RBC # BLD AUTO: 2.85 MILLION/UL (ref 3.88–5.62)
SODIUM SERPL-SCNC: 142 MMOL/L (ref 135–147)
WBC # BLD AUTO: 5.57 THOUSAND/UL (ref 4.31–10.16)

## 2025-02-10 DIAGNOSIS — I10 ESSENTIAL HYPERTENSION: ICD-10-CM

## 2025-02-11 RX ORDER — AMLODIPINE BESYLATE 10 MG/1
10 TABLET ORAL DAILY
Qty: 90 TABLET | Refills: 1 | Status: SHIPPED | OUTPATIENT
Start: 2025-02-11

## 2025-02-12 ENCOUNTER — TELEPHONE (OUTPATIENT)
Age: 80
End: 2025-02-12

## 2025-02-12 DIAGNOSIS — I50.33 ACUTE ON CHRONIC HEART FAILURE WITH PRESERVED EJECTION FRACTION (HFPEF) (HCC): ICD-10-CM

## 2025-02-12 DIAGNOSIS — N17.9 ACUTE KIDNEY INJURY (HCC): Primary | ICD-10-CM

## 2025-02-12 DIAGNOSIS — I50.9 CHF EXACERBATION (HCC): ICD-10-CM

## 2025-02-12 DIAGNOSIS — N18.4 CKD (CHRONIC KIDNEY DISEASE) STAGE 4, GFR 15-29 ML/MIN (HCC): ICD-10-CM

## 2025-02-12 RX ORDER — TORSEMIDE 10 MG/1
10 TABLET ORAL DAILY
Start: 2025-02-12

## 2025-02-17 ENCOUNTER — PATIENT OUTREACH (OUTPATIENT)
Dept: CASE MANAGEMENT | Facility: OTHER | Age: 80
End: 2025-02-17

## 2025-02-17 NOTE — PROGRESS NOTES
Outpatient Care Management Note:    Care manager called Jay. He states that his weight has been stable at 159-160 lbs.  He denies gaining 3 lb in 1 day or 5 lb in 1 week.  He states that he needs repeat blood work completed for nephrology in 3 weeks. He is uncertain how much fluid he is to be drinking.  On 2/3 nephrology increased his fluids to 2.5 quarts daily. 2/12 nephrology note states increase oral hydration to 60 ounces per day. CM will forward to Dr Muñoz.     Jay states that he does get short of breath when laying flat to sleep. He thinks it is an ongoing issue for several weeks. He denies any issues sleeping and notes that it is not a severe problem. He denies any swelling.     Jay states his blood sugar is averaging . He had an episode of low blood sugar with feeling shaky.  He ate something sweet and it improved.  CM instructed him to call his PCP if he has any further low blood sugar symptoms.     He is receiving meals on wheels.     CM will follow up in 1 week.     Inbasket message received from Dr Muñoz recommending 60 ounces per day and noting that 80 ounces is too much considering his recent CHF admission.     CM called Jay and reviewed above. We reviewed how to measure intake using measuring cups or quarts.  Jay was curious how many quarts equaled 60 ounces. CM reviewed that 2 quarts=64 ounces.

## 2025-02-18 ENCOUNTER — PATIENT OUTREACH (OUTPATIENT)
Dept: CASE MANAGEMENT | Facility: OTHER | Age: 80
End: 2025-02-18

## 2025-02-18 NOTE — ASSESSMENT & PLAN NOTE
Patient with short-term memory los  Continue supportive measures  Continue to reorient, redirect, and reassure patient  Encourage engagement and activity  Encourage daily involvement in ADLs  Maintain delirium precautions and sleep/wake cycle  Patient remains at risk for delirium r/t age, medications, sleep disturbance, and pain  Avoid deliriogenic medications such as tramadol, benzodiazepines, anticholinergics, and Benadryl  Limit interruptions at night as medically appropriate  Provide quiet environment, dim lighting, and avoidance tv at night  Patient continues to require assistance in which supportive services can be provided by VNA services  Encourage adequate nutrition and hydration  Continue to monitor for acute changes in condition  Follow up with PCP

## 2025-02-18 NOTE — ASSESSMENT & PLAN NOTE
As stated above  Orders:    CBC and differential; Future    Iron Panel (Includes Ferritin, Iron Sat%, Iron, and TIBC); Future

## 2025-02-18 NOTE — ASSESSMENT & PLAN NOTE
Most recent Hgb 7.9/Hct 24.3  Continue to monitor CBC periodically  Monitor for signs and symptoms of bleeding  Continue to monitor patient for acute changes in condition  Follow up with PCP

## 2025-02-18 NOTE — ASSESSMENT & PLAN NOTE
Most recent Echo with LVEF 65%  Current wt 158 lbs  Continue to monitor weights  Continue to monitor BMP   Continue Torsemide 20 mg daily  Continue JAKOB diet  Continue to monitor for s/s of fluid overload  Follow up with PCP/Cardiology

## 2025-02-18 NOTE — ASSESSMENT & PLAN NOTE
History of falls  Maintain fall and safety precautions  Encourage use of asst devices  Continue PT/OT services with VNA  Assess for need with assistance with transfers, mobility, and ADLs in/out of home  Patient is at high risk for falls r/t short-term memory loss, s/p lap surgery, anemia, DM, generalized weakness, environmental barriers, and age  Continue to monitor for acute changes in condition   Follow up with PCP

## 2025-02-18 NOTE — ASSESSMENT & PLAN NOTE
Most recent Hgb/Hct  Continue to monitor CBC periodically  Monitor for signs and symptoms of bleeding  Continue to monitor patient for acute changes in condition  Follow up with PCP

## 2025-02-18 NOTE — ASSESSMENT & PLAN NOTE
Most recent Creatinine 2.76/Gfr 23, baseline Cr 1.8-2.2  Continue to monitor BMP periodically  Avoid nephrotoxins and NSAIDS  Avoid hypotension  Continue to monitor for acute changes in condition  Follow up with PCP

## 2025-02-18 NOTE — ASSESSMENT & PLAN NOTE
Continue Protonix 40 mg BID 30 min prior to meals  Avoid citrus, spicy, caffeine, chocolate, and trigger foods  Avoid eating/drinking 1 hour prior to laying down  Continue to monitor for acute changes in condition  Follow up with PCP

## 2025-02-18 NOTE — ASSESSMENT & PLAN NOTE
History of gastric ulcers s/p clipping  Patient reports continued dark stools  Continue protonix 40 mg BID  Follow up with GI/PCP

## 2025-02-18 NOTE — ASSESSMENT & PLAN NOTE
Lab Results   Component Value Date    HGBA1C 6.6 (H) 12/24/2024   Bgs well controlled  Patient is currently diet controlled  Continue intermittent Accu-cheks   Educate patient on s/s of hyper/hypoglycemia  Continue to monitor for acute changes in condition   Follow up with PCP/Endocrinology

## 2025-02-18 NOTE — ASSESSMENT & PLAN NOTE
Patient denies angina  Continue Plavix, Atorvastatin, and metoprolol  Continue to monitor for acute changes in condition  Follow up with PCP

## 2025-02-18 NOTE — ASSESSMENT & PLAN NOTE
BP remains stable today, 138/58  Continue Amlodipine, Hydralazine, Metoprolol and Torsemide  Avoid hypotension  Continue to monitor BP and for acute changes in condition  Follow up with PCP/Cardiology

## 2025-02-18 NOTE — ASSESSMENT & PLAN NOTE
BP remains stable today, 122/64  Continue Amlodipine, Hydralazine, Metoprolol and Torsemide  Avoid hypotension  Continue to monitor BP and for acute changes in condition  Follow up with PCP/Cardiology

## 2025-02-18 NOTE — ASSESSMENT & PLAN NOTE
Most recent Creatinine 2.91/Gfr 19, slightly elevated  Continue to monitor BMP periodically  Avoid nephrotoxins and NSAIDS  Avoid hypotension  Continue to monitor for acute changes in condition  Follow up with PCP     Orders:    Basic metabolic panel; Future

## 2025-02-18 NOTE — ASSESSMENT & PLAN NOTE
Most recent Hgb 9.1/Hct 29.9, improved  Continue to monitor CBC periodically  Monitor for signs and symptoms of bleeding  Continue to monitor patient for acute changes in condition  Follow up with PCP   Orders:    CBC and differential; Future

## 2025-02-18 NOTE — ASSESSMENT & PLAN NOTE
Wt Readings from Last 3 Encounters:   02/03/25 71.6 kg (157 lb 12.8 oz)   01/31/25 71.6 kg (157 lb 12.8 oz)   01/23/25 73 kg (161 lb)   Most recent Echo with LVEF 65%  Current wt 159 lbs  Continue to monitor weights  Continue to monitor BMP   Continue Torsemide 20 mg daily  Continue JAKOB diet  Continue to monitor for s/s of fluid overload  Follow up with PCP/Cardiology

## 2025-02-18 NOTE — ASSESSMENT & PLAN NOTE
Patient with multiple comorbid conditions with recent hospital admission requiring medical management of acute and chronic medical conditions  Patient continues to require assistance in which VNA/HH supportive services can be provided  Maintain fall and safety precautions  Continue PT/OT svcs with VNA/HH  Will assist in medication education and disease management  Continue to monitor patient for acute changes in condition  Follow up with all scheduled outpatient appts

## 2025-02-18 NOTE — ASSESSMENT & PLAN NOTE
Hospitalized from 12/24/2024 to 1/3/2025 with mesenteric ischemia. Underwent exploratory laparotomy, lysis of adhesions, mesenteric angiogram and retrograde open mesenteric stent placement on 12/24/24.   Continue to monitor for acute changes in condition  Follow up with GI/Cardiology

## 2025-02-19 ENCOUNTER — OFFICE VISIT (OUTPATIENT)
Dept: VASCULAR SURGERY | Facility: CLINIC | Age: 80
End: 2025-02-19
Payer: MEDICARE

## 2025-02-19 VITALS
TEMPERATURE: 97.8 F | BODY MASS INDEX: 23.48 KG/M2 | SYSTOLIC BLOOD PRESSURE: 146 MMHG | DIASTOLIC BLOOD PRESSURE: 68 MMHG | OXYGEN SATURATION: 98 % | HEART RATE: 53 BPM | HEIGHT: 70 IN | WEIGHT: 164 LBS

## 2025-02-19 DIAGNOSIS — K55.9 MESENTERIC ISCHEMIA (HCC): Primary | ICD-10-CM

## 2025-02-19 DIAGNOSIS — I70.211 ATHEROSCLEROSIS OF NATIVE ARTERIES OF EXTREMITIES WITH INTERMITTENT CLAUDICATION, RIGHT LEG (HCC): Chronic | ICD-10-CM

## 2025-02-19 PROBLEM — I87.9 DISORDER OF PORTAL VENOUS SYSTEM: Status: RESOLVED | Noted: 2024-12-24 | Resolved: 2025-02-19

## 2025-02-19 PROCEDURE — 99214 OFFICE O/P EST MOD 30 MIN: CPT | Performed by: SURGERY

## 2025-02-19 NOTE — PROGRESS NOTES
Name: Jay Roach Jr.      : 1945      MRN: [id number]  Encounter Provider: Eagle Higuera MD  Encounter Date: 2025   Encounter department: [facility]  :  Assessment & Plan  Mesenteric ischemia (HCC)  79 y.o. male with PMH of constipation, HTN, diabetes, smoking, PAD s/p LLE angioplasty common femoral endarterectomy and stenting, s/p TAVR (on plavix), CKD, lung cancer s/p radiation therapy, and appendectomy with right hemicolectomy who initially presented to the hospital with mesenteric ischemia. He is s/p  exploratory laparotomy, retrograde open mesenteric stent      Follow up mesenteric artery stent duplex in 3 months.    Continue daily Plavix.  Orders:  •  VAS CELIAC AND/OR MESENTERIC DUPLEX; Future    Atherosclerosis of native arteries of extremities with intermittent claudication, right leg (HCC)  78 y/o HTN, HLD, DM, CAD, CABG, AS, bilateral asymptomatic carotid stenosis, PAD status post left fem to anterior tibial bypass by Dr Urena '15, s/p  Angioplasty to bypass graft stenoses ', recurrent bypass graft stenosis  Right femoral endarterectomy and extensive shock wave of right SFA in 2023  CKD 4  Former smoker (quit Dec 2024)     Ongoing short distance claudication in right leg about 20ft.  No rest pain.  SAMEER is low 0.15  There is more stenosis of the right iliac artery and popliteal.     He is a high risk candidate for intervention due to CKD4 and frail status.     I would recommend procedure only if there are wounds or severe rest pain.  Repeat arterial doppler and close followup.    Contineu daily plavix and lipitor.  Orders:  •  VAS CELIAC AND/OR MESENTERIC DUPLEX; Future        History of Present Illness   HPI  Jay Roach Jr. is a 79 y.o. male who presents for evaluation of follow-up after recent abdominal exploration and retrograde mesenteric stent.  This was done for acute mesenteric ischemia.  Patient also complains of worsening right  leg claudication.  About 20 feet of walking causes cramps in his right leg and he has to stop for 5 minutes and then keeps going.  Denies any rest pain or wounds on his right foot.  On the left leg he has had a previous bypass.  Patient is completely quit smoking since he left the hospital in January.  No further abdominal pain.  Eating regular diet with normal bowel movement.    Patient is here for a Post op - Exploratory laparotomy with retrograde open SMA sent 12/24/24 - follow up to hospital 12/24/24.  History obtained from: patient    Review of Systems  Past Medical History   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 ([facility]) 09/10/2021   • Atherosclerosis of native arteries of extremities with intermittent claudication, right leg ([facility]) 01/06/2025   • 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 ([facility]) 02/24/2023   • Basal cell carcinoma     right cheek   • CAD (coronary artery disease)    • Carotid stenosis, asymptomatic, bilateral    • Chronic kidney disease    • Colon polyp    • Dependence on respirator (ventilator) status ([facility]) 04/07/2023   • Diabetes ([facility])     type 2, non-insulin dependent   • Diabetes mellitus ([facility])    • GERD (gastroesophageal reflux disease)    • History of nephrolithiasis    • Hyperlipidemia    • Hypertension    • Left foot pain 11/30/2022   • Lung cancer ([facility])    • PAD (peripheral artery disease) ([facility])    • Portal Venous Gas 12/24/2024   • Severe aortic stenosis    • Squamous cell skin cancer 11/22/2022    left superior helix     Past Surgical History:   Procedure Laterality Date   • AORTA - SUPERIOR MESENTERIC ARTERY BYPASS GRAFT N/A 12/24/2024    Procedure: OPEN MESENTERIC STENTING;  Surgeon: Eagle Higuera MD;  [facility];  Service: Vascular   • APPENDECTOMY     • ARTERIOGRAM N/A 12/24/2024    Procedure: ARTERIOGRAM;  Surgeon: Eagle Pearson  MD Davida;  [facility];  Service: Vascular   • CARDIAC CATHETERIZATION N/A 05/05/2022    Procedure: CARDIAC RHC/LHC;  Surgeon: Arvin Sanchez DO;  [facility];  Service: Cardiology   • CARDIAC CATHETERIZATION N/A 05/05/2022    Procedure: Cardiac Coronary Angiogram;  Surgeon: Arvin Sanchez DO;  [facility];  Service: Cardiology   • CARDIAC CATHETERIZATION N/A 05/24/2022    Procedure: Cardiac pci;  Surgeon: Damien Jeter MD;  [facility];  Service: Cardiology   • CARDIAC CATHETERIZATION N/A 06/14/2022    Procedure: CARDIAC TAVR;  Surgeon: Nahum Vaz MD;  [facility];  Service: Cardiology   • COLECTOMY     • COLONOSCOPY  2013   • CORONARY ARTERY BYPASS GRAFT  2013    X 2   • FEMORAL ARTERY - POPLITEAL ARTERY BYPASS GRAFT     • HEMORRHOID SURGERY     • IR AORTAGRAM WITH RUN-OFF  11/19/2018   • IR AORTAGRAM WITH RUN-OFF  03/02/2023   • IR BIOPSY LUNG  4/17/2024   • IR LOWER EXTREMITY ANGIOGRAM  03/23/2023   • IR MESENTERIC/VISCERAL ANGIOGRAM  12/24/2024   • MOHS SURGERY Left 01/11/2023    SCC left superior helix-Dr Tovar   • TN LAPS ABD PRTM&OMENTUM DX W/WO SPEC BR/WA SPX N/A 12/24/2024    Procedure: EXPLORATORY LAPAROTOMY, LYSIS OF ADHESIONS, ABDOMEN CLOSURE;  Surgeon: Alvin Florse MD;  [facility];  Service: General   • TN SLCTV CATHJ 3RD+ ORD SLCTV ABDL PEL/LXTR BRNCH Left 08/12/2016    Procedure: LEFT FEMORAL ARTERIOGRAM; BALLOON ANGIOPLASTY; SFA  AND FEMORAL AT VEIN GRAFT;  Surgeon: Nicholas Urena MD;  [facility];  Service: Vascular   • TN TEAEC W/WO PATCH GRAFT COMMON FEMORAL Right 03/23/2023    Procedure: Common femoral endarterectomy&antegrade intervention of SFA, popliteal artery w/ shockwave;  Surgeon: Eagle Higuera MD;  Location[facility];  Service: Vascular   • TN TRANSCATHETER TRANSAPICAL REPLACEMT AORTIC VALVE N/A 06/14/2022    Procedure: REPLACEMENT AORTIC VALVE TRANSCATHETER  "(TAVR) TRANSAPICAL 29MM IRVIN KYLER S3 ULTRA VALVE(ACCESS ON LEFT) ELANA;  Surgeon: Amarjit Gordon DO;  [facility];  Service: Cardiac Surgery   • SKIN CANCER EXCISION     • TONSILLECTOMY AND ADENOIDECTOMY     • UPPER GASTROINTESTINAL ENDOSCOPY       Family History   Problem Relation Age of Onset   • Heart attack Father    • Other Sister         bypass and vlave replacement   • Stroke Paternal Uncle    • Arrhythmia Neg Hx    • Asthma Neg Hx    • Clotting disorder Neg Hx    • Fainting Neg Hx    • Anuerysm Neg Hx    • Hypertension Neg Hx         unsure    • Hyperlipidemia Neg Hx    • Heart failure Neg Hx       reports that he has been smoking cigarettes. He has a 62.5 pack-year smoking history. He has been exposed to tobacco smoke. He has never used smokeless tobacco. He reports that he does not currently use alcohol. He reports that he does not use drugs.  Current Outpatient Medications   Medication Instructions   • allopurinol (ZYLOPRIM) 300 mg, Oral, Daily   • amLODIPine (NORVASC) 10 mg, Oral, Daily   • atorvastatin (LIPITOR) 80 mg, Oral, Daily at bedtime   • calcitriol (ROCALTROL) 0.25 mcg, Oral, Daily   • clopidogrel (PLAVIX) 75 mg, Oral, Daily   • ferrous sulfate 325 mg, Every other day   • glimepiride (AMARYL) 1 mg, Oral, Daily with breakfast   • hydrALAZINE (APRESOLINE) 75 mg, Oral, 2 times daily   • magnesium Oxide (MAG-OX) 400 mg, Oral, 2 times daily   • metoprolol tartrate (LOPRESSOR) 12.5 mg, Oral, Every 12 hours scheduled   • pancrelipase, Lip-Prot-Amyl, (Creon) 24,000 units 3 times daily with meals   • pantoprazole (PROTONIX) 40 mg, Oral, 2 times daily   • potassium chloride (Klor-Con M20) 20 mEq tablet 20 mEq, Oral, Daily   • torsemide (DEMADEX) 10 mg, Oral, Daily   No Known Allergies      Objective   /68 (BP Location: Right arm, Patient Position: Sitting, Cuff Size: Adult)   Pulse (!) 53   Temp 97.8 °F (36.6 °C) (Temporal)   Ht 5' 10\" (1.778 m)   Wt 74.4 kg (164 lb)   SpO2 98%  "  BMI 23.53 kg/m²      Physical Exam  Vitals and nursing note reviewed.   Constitutional:       Appearance: Normal appearance.   HENT:      Head: Normocephalic and atraumatic.   Cardiovascular:      Rate and Rhythm: Normal rate and regular rhythm.      Pulses:           Dorsalis pedis pulses are 0 on the right side and detected w/ Doppler on the left side.        Posterior tibial pulses are 0 on the right side.      Comments: No Doppler signals on the right foot.  Left has triphasic peroneal signals  Abdominal:      General: There is no distension.      Palpations: Abdomen is soft. There is no mass.      Tenderness: There is no abdominal tenderness. There is no guarding.      Hernia: No hernia is present.      Comments: Abdominal incision well-healed.   Musculoskeletal:      Right lower leg: No edema.      Left lower leg: No edema.   Skin:     General: Skin is warm and dry.      Capillary Refill: Capillary refill takes less than 2 seconds.   Neurological:      General: No focal deficit present.      Mental Status: He is alert and oriented to person, place, and time.

## 2025-02-19 NOTE — ASSESSMENT & PLAN NOTE
79 y.o. male with PMH of constipation, HTN, diabetes, smoking, PAD s/p LLE angioplasty common femoral endarterectomy and stenting, s/p TAVR (on plavix), CKD, lung cancer s/p radiation therapy, and appendectomy with right hemicolectomy who initially presented to the hospital with mesenteric ischemia. He is s/p 12/24 exploratory laparotomy, retrograde open mesenteric stent      Follow up mesenteric artery stent duplex in 3 months.    Continue daily Plavix.  Orders:  •  VAS CELIAC AND/OR MESENTERIC DUPLEX; Future

## 2025-02-19 NOTE — ASSESSMENT & PLAN NOTE
78 y/o HTN, HLD, DM, CAD, CABG, AS, bilateral asymptomatic carotid stenosis, PAD status post left fem to anterior tibial bypass by Dr Urena '15, s/p  Angioplasty to bypass graft stenoses '16, recurrent bypass graft stenosis  Right femoral endarterectomy and extensive shock wave of right SFA in March 2023  CKD 4  Former smoker (quit Dec 2024)     Ongoing short distance claudication in right leg about 20ft.  No rest pain.  SAMEER is low 0.15  There is more stenosis of the right iliac artery and popliteal.     He is a high risk candidate for intervention due to CKD4 and frail status.     I would recommend procedure only if there are wounds or severe rest pain.  Repeat arterial doppler and close followup.    Contineu daily plavix and lipitor.  Orders:  •  VAS CELIAC AND/OR MESENTERIC DUPLEX; Future

## 2025-02-20 ENCOUNTER — OFFICE VISIT (OUTPATIENT)
Dept: SURGERY | Facility: CLINIC | Age: 80
End: 2025-02-20
Payer: MEDICARE

## 2025-02-20 VITALS
TEMPERATURE: 97.6 F | OXYGEN SATURATION: 98 % | WEIGHT: 164.8 LBS | DIASTOLIC BLOOD PRESSURE: 74 MMHG | HEIGHT: 70 IN | HEART RATE: 64 BPM | BODY MASS INDEX: 23.59 KG/M2 | SYSTOLIC BLOOD PRESSURE: 124 MMHG

## 2025-02-20 DIAGNOSIS — C44.729 SQUAMOUS CELL CARCINOMA OF LEFT LOWER LEG: Primary | ICD-10-CM

## 2025-02-20 PROCEDURE — 99212 OFFICE O/P EST SF 10 MIN: CPT | Performed by: SURGERY

## 2025-02-20 NOTE — PROGRESS NOTES
The patient is a 79-year-old male upon whom I removed a squamous cell carcinoma from his leg last September.  This was difficult to close and in fact elevated closed by secondary intention.  The path report said that the lateral margin might be positive.  As such I asked him to return after a few months for me to make sure this is healing.  This is a elderly white male who looks chronically ill.  He is quite thin.  The incision on his leg is healed nicely.  The lateral margins are just fine.  Inferiorly there is a slight bump on the medial side which I think is a suture that has not fully deteriorated as of yet.  It is soft and does not look like a recurrence.  Still, I think he should come back in 6 months for me to make sure that there is nothing going on

## 2025-02-24 ENCOUNTER — PATIENT OUTREACH (OUTPATIENT)
Dept: CASE MANAGEMENT | Facility: OTHER | Age: 80
End: 2025-02-24

## 2025-02-24 NOTE — PROGRESS NOTES
Outpatient Care Management Note:    Care manager called aJy. He states that he is doing well. He denies any needs. He followed up with his vascular and general surgeon.  No new changes.     He states his weight has remained stable at 159-160 lbs.  He knows to call cardiology if he would gain 3 lb in 1 day or 5 lb in 1 week.  He is keeping his fluid intake under 60 ounces per day. He denies any swelling or increased shortness of breath.      Jay states his blood sugar this morning was 101.  He denies any issues related to his blood sugars.     Jay is self managing his medical needs. CM will close the referral. He will call his PCP with any concerns.

## 2025-02-25 DIAGNOSIS — I10 ESSENTIAL HYPERTENSION: ICD-10-CM

## 2025-02-25 DIAGNOSIS — M10.9 GOUT OF LEFT FOOT, UNSPECIFIED CAUSE, UNSPECIFIED CHRONICITY: ICD-10-CM

## 2025-02-25 DIAGNOSIS — N25.81 SECONDARY HYPERPARATHYROIDISM OF RENAL ORIGIN (HCC): ICD-10-CM

## 2025-02-25 RX ORDER — CALCITRIOL 0.25 UG/1
0.25 CAPSULE, LIQUID FILLED ORAL DAILY
Qty: 90 CAPSULE | Refills: 1 | Status: SHIPPED | OUTPATIENT
Start: 2025-02-25

## 2025-02-25 NOTE — TELEPHONE ENCOUNTER
Reason for call:   [x] Refill   [] Prior Auth  [] Other:     Office:   [x] PCP/Provider - Simón Hadley   [] Specialty/Provider -     Medication:  allopurinol (ZYLOPRIM) 300 mg tablet    Dose/Frequency:  TAKE 1 TABLET DAILY,     Quantity: 90    Pharmacy: [facility] - [address]      Does the patient have enough for 3 days?   [x] Yes   [] No - Send as HP to POD

## 2025-02-26 RX ORDER — ALLOPURINOL 300 MG/1
300 TABLET ORAL DAILY
Qty: 90 TABLET | Refills: 1 | Status: SHIPPED | OUTPATIENT
Start: 2025-02-26

## 2025-02-26 NOTE — TELEPHONE ENCOUNTER
Patient called to request a refill for their Allopurinol advised a refill was requested on 2/25/25 and is pending approval. Patient verbalized understanding and is in agreement.     Does the patient have enough for 3 days?   [] Yes   [x] No - Send as HP to POD - only has 1 week left for mail order

## 2025-02-28 ENCOUNTER — HOSPITAL ENCOUNTER (OUTPATIENT)
Dept: VASCULAR ULTRASOUND | Facility: HOSPITAL | Age: 80
Discharge: HOME | End: 2025-02-28
Attending: SURGERY
Payer: MEDICARE

## 2025-02-28 DIAGNOSIS — I70.213 ATHEROSCLEROSIS OF NATIVE ARTERIES OF EXTREMITIES WITH INTERMITTENT CLAUDICATION, BILATERAL LEGS (HCC): Chronic | ICD-10-CM

## 2025-02-28 DIAGNOSIS — I65.23 ASYMPTOMATIC BILATERAL CAROTID ARTERY STENOSIS: Chronic | ICD-10-CM

## 2025-02-28 PROCEDURE — 93923 UPR/LXTR ART STDY 3+ LVLS: CPT

## 2025-02-28 PROCEDURE — 93880 EXTRACRANIAL BILAT STUDY: CPT

## 2025-02-28 PROCEDURE — 93925 LOWER EXTREMITY STUDY: CPT | Performed by: SURGERY

## 2025-02-28 PROCEDURE — 93925 LOWER EXTREMITY STUDY: CPT

## 2025-02-28 PROCEDURE — 93922 UPR/L XTREMITY ART 2 LEVELS: CPT | Performed by: SURGERY

## 2025-02-28 PROCEDURE — 93880 EXTRACRANIAL BILAT STUDY: CPT | Performed by: SURGERY

## 2025-03-03 ENCOUNTER — TRANSCRIBE ORDERS (OUTPATIENT)
Dept: VASCULAR SURGERY | Facility: CLINIC | Age: 80
End: 2025-03-03

## 2025-03-03 DIAGNOSIS — I65.23 ASYMPTOMATIC BILATERAL CAROTID ARTERY STENOSIS: Primary | ICD-10-CM

## 2025-03-03 DIAGNOSIS — I73.9 PVD (PERIPHERAL VASCULAR DISEASE) (HCC): Primary | ICD-10-CM

## 2025-03-11 ENCOUNTER — TELEPHONE (OUTPATIENT)
Dept: CARDIOLOGY CLINIC | Facility: MEDICAL CENTER | Age: 80
End: 2025-03-11

## 2025-03-11 DIAGNOSIS — K55.9 MESENTERIC ISCHEMIA (HCC): ICD-10-CM

## 2025-03-11 DIAGNOSIS — I50.9 CHF EXACERBATION (HCC): ICD-10-CM

## 2025-03-11 DIAGNOSIS — N18.4 CKD (CHRONIC KIDNEY DISEASE) STAGE 4, GFR 15-29 ML/MIN (HCC): ICD-10-CM

## 2025-03-11 DIAGNOSIS — I50.33 ACUTE ON CHRONIC HEART FAILURE WITH PRESERVED EJECTION FRACTION (HFPEF) (HCC): ICD-10-CM

## 2025-03-11 NOTE — TELEPHONE ENCOUNTER
Reason for call:   [x] Refill   [] Prior Auth  [] Other:     Office:   [x] PCP/Provider - Simón Hadley  [] Specialty/Provider -     Medication: metoprolol tartrate (LOPRESSOR) 25 mg tablet     Dose/Frequency:        Take 0.5 tablets (12.5 mg total) by mouth every 12 (twelve) hours               Quantity: 90, 1 refill    Pharmacy: [facility]        Does the patient have enough for 3 days?   [x] Yes   [] No - Send as HP to POD

## 2025-03-11 NOTE — TELEPHONE ENCOUNTER
Patient was called to reschedule April appointment. During the call, patient stated that the medication hydralazine that was prescribed in the hospital needs to be refilled. Patient requests an order for a  90 day supply to be sent to [facility].

## 2025-03-11 NOTE — TELEPHONE ENCOUNTER
Patient called stating dosage was increased inpatient, he is unsure if he is to continue taking hydrALAZINE  75mg. Would like a call back with confirmation of dosage and a new script sent to [facility]

## 2025-03-12 DIAGNOSIS — I50.33 ACUTE ON CHRONIC HEART FAILURE WITH PRESERVED EJECTION FRACTION (HFPEF) (HCC): ICD-10-CM

## 2025-03-12 DIAGNOSIS — N18.4 CKD (CHRONIC KIDNEY DISEASE) STAGE 4, GFR 15-29 ML/MIN (HCC): ICD-10-CM

## 2025-03-12 DIAGNOSIS — I50.9 CHF EXACERBATION (HCC): ICD-10-CM

## 2025-03-12 RX ORDER — HYDRALAZINE HYDROCHLORIDE 25 MG/1
75 TABLET, FILM COATED ORAL 2 TIMES DAILY
Qty: 540 TABLET | Refills: 3 | Status: SHIPPED | OUTPATIENT
Start: 2025-03-12

## 2025-03-12 RX ORDER — HYDRALAZINE HYDROCHLORIDE 25 MG/1
75 TABLET, FILM COATED ORAL 2 TIMES DAILY
Qty: 540 TABLET | Refills: 3 | Status: SHIPPED | OUTPATIENT
Start: 2025-03-12 | End: 2025-03-12 | Stop reason: SDUPTHER

## 2025-03-12 RX ORDER — METOPROLOL TARTRATE 25 MG/1
12.5 TABLET, FILM COATED ORAL EVERY 12 HOURS SCHEDULED
Qty: 90 TABLET | Refills: 1 | Status: SHIPPED | OUTPATIENT
Start: 2025-03-12 | End: 2025-04-11

## 2025-03-13 ENCOUNTER — RESULTS FOLLOW-UP (OUTPATIENT)
Dept: OTHER | Facility: HOSPITAL | Age: 80
End: 2025-03-13

## 2025-03-13 ENCOUNTER — APPOINTMENT (OUTPATIENT)
Dept: LAB | Facility: MEDICAL CENTER | Age: 80
End: 2025-03-13
Payer: MEDICARE

## 2025-03-13 ENCOUNTER — OFFICE VISIT (OUTPATIENT)
Dept: DERMATOLOGY | Facility: CLINIC | Age: 80
End: 2025-03-13
Payer: MEDICARE

## 2025-03-13 DIAGNOSIS — R80.1 PERSISTENT PROTEINURIA, UNSPECIFIED: ICD-10-CM

## 2025-03-13 DIAGNOSIS — N20.0 NEPHROLITHIASIS: ICD-10-CM

## 2025-03-13 DIAGNOSIS — C44.729 SCC (SQUAMOUS CELL CARCINOMA), LEG, LEFT: ICD-10-CM

## 2025-03-13 DIAGNOSIS — D50.9 IRON DEFICIENCY ANEMIA, UNSPECIFIED IRON DEFICIENCY ANEMIA TYPE: ICD-10-CM

## 2025-03-13 DIAGNOSIS — D22.72 MULTIPLE BENIGN MELANOCYTIC NEVI OF BOTH UPPER EXTREMITIES, BOTH LOWER EXTREMITIES, AND TRUNK: ICD-10-CM

## 2025-03-13 DIAGNOSIS — L85.3 XEROSIS OF SKIN: ICD-10-CM

## 2025-03-13 DIAGNOSIS — E11.22 CONTROLLED TYPE 2 DIABETES MELLITUS WITH STAGE 4 CHRONIC KIDNEY DISEASE, WITHOUT LONG-TERM CURRENT USE OF INSULIN (HCC): ICD-10-CM

## 2025-03-13 DIAGNOSIS — N25.81 SECONDARY HYPERPARATHYROIDISM OF RENAL ORIGIN (HCC): ICD-10-CM

## 2025-03-13 DIAGNOSIS — N18.4 CONTROLLED TYPE 2 DIABETES MELLITUS WITH STAGE 4 CHRONIC KIDNEY DISEASE, WITHOUT LONG-TERM CURRENT USE OF INSULIN (HCC): ICD-10-CM

## 2025-03-13 DIAGNOSIS — D22.71 MULTIPLE BENIGN MELANOCYTIC NEVI OF BOTH UPPER EXTREMITIES, BOTH LOWER EXTREMITIES, AND TRUNK: ICD-10-CM

## 2025-03-13 DIAGNOSIS — D22.5 MULTIPLE BENIGN MELANOCYTIC NEVI OF BOTH UPPER EXTREMITIES, BOTH LOWER EXTREMITIES, AND TRUNK: ICD-10-CM

## 2025-03-13 DIAGNOSIS — D22.61 MULTIPLE BENIGN MELANOCYTIC NEVI OF BOTH UPPER EXTREMITIES, BOTH LOWER EXTREMITIES, AND TRUNK: ICD-10-CM

## 2025-03-13 DIAGNOSIS — N18.4 CKD (CHRONIC KIDNEY DISEASE) STAGE 4, GFR 15-29 ML/MIN (HCC): Primary | ICD-10-CM

## 2025-03-13 DIAGNOSIS — L81.4 LENTIGO: ICD-10-CM

## 2025-03-13 DIAGNOSIS — Z85.89 HISTORY OF SQUAMOUS CELL CARCINOMA: Primary | ICD-10-CM

## 2025-03-13 DIAGNOSIS — L57.0 ACTINIC KERATOSIS: ICD-10-CM

## 2025-03-13 DIAGNOSIS — D22.62 MULTIPLE BENIGN MELANOCYTIC NEVI OF BOTH UPPER EXTREMITIES, BOTH LOWER EXTREMITIES, AND TRUNK: ICD-10-CM

## 2025-03-13 DIAGNOSIS — I12.9 BENIGN HYPERTENSION WITH CKD (CHRONIC KIDNEY DISEASE) STAGE IV (HCC): ICD-10-CM

## 2025-03-13 DIAGNOSIS — N18.4 CKD (CHRONIC KIDNEY DISEASE) STAGE 4, GFR 15-29 ML/MIN (HCC): ICD-10-CM

## 2025-03-13 DIAGNOSIS — I50.32 CHRONIC DIASTOLIC CHF (CONGESTIVE HEART FAILURE) (HCC): ICD-10-CM

## 2025-03-13 DIAGNOSIS — L82.1 SEBORRHEIC KERATOSIS: ICD-10-CM

## 2025-03-13 DIAGNOSIS — N17.9 ACUTE KIDNEY INJURY (HCC): ICD-10-CM

## 2025-03-13 DIAGNOSIS — N18.4 BENIGN HYPERTENSION WITH CKD (CHRONIC KIDNEY DISEASE) STAGE IV (HCC): ICD-10-CM

## 2025-03-13 DIAGNOSIS — D18.01 CHERRY ANGIOMA: ICD-10-CM

## 2025-03-13 LAB
ALBUMIN SERPL BCG-MCNC: 4 G/DL (ref 3.5–5)
ALP SERPL-CCNC: 72 U/L (ref 34–104)
ALT SERPL W P-5'-P-CCNC: 8 U/L (ref 7–52)
ANION GAP SERPL CALCULATED.3IONS-SCNC: 9 MMOL/L (ref 4–13)
AST SERPL W P-5'-P-CCNC: 11 U/L (ref 13–39)
BILIRUB SERPL-MCNC: 0.38 MG/DL (ref 0.2–1)
BUN SERPL-MCNC: 56 MG/DL (ref 5–25)
CALCIUM SERPL-MCNC: 9.4 MG/DL (ref 8.4–10.2)
CHLORIDE SERPL-SCNC: 104 MMOL/L (ref 96–108)
CO2 SERPL-SCNC: 27 MMOL/L (ref 21–32)
CREAT SERPL-MCNC: 2.79 MG/DL (ref 0.6–1.3)
CREAT UR-MCNC: 62.3 MG/DL
ERYTHROCYTE [DISTWIDTH] IN BLOOD BY AUTOMATED COUNT: 15.2 % (ref 11.6–15.1)
GFR SERPL CREATININE-BSD FRML MDRD: 20 ML/MIN/1.73SQ M
GLUCOSE SERPL-MCNC: 113 MG/DL (ref 65–140)
HCT VFR BLD AUTO: 30.4 % (ref 36.5–49.3)
HGB BLD-MCNC: 9.4 G/DL (ref 12–17)
MAGNESIUM SERPL-MCNC: 2 MG/DL (ref 1.9–2.7)
MCH RBC QN AUTO: 30.3 PG (ref 26.8–34.3)
MCHC RBC AUTO-ENTMCNC: 30.9 G/DL (ref 31.4–37.4)
MCV RBC AUTO: 98 FL (ref 82–98)
PHOSPHATE SERPL-MCNC: 4.3 MG/DL (ref 2.3–4.1)
PLATELET # BLD AUTO: 169 THOUSANDS/UL (ref 149–390)
PMV BLD AUTO: 11 FL (ref 8.9–12.7)
POTASSIUM SERPL-SCNC: 4.3 MMOL/L (ref 3.5–5.3)
PROT SERPL-MCNC: 6.8 G/DL (ref 6.4–8.4)
PROT UR-MCNC: 17.6 MG/DL
PROT/CREAT UR: 0.3 MG/G{CREAT} (ref 0–0.1)
PTH-INTACT SERPL-MCNC: 82.3 PG/ML (ref 12–88)
RBC # BLD AUTO: 3.1 MILLION/UL (ref 3.88–5.62)
SODIUM SERPL-SCNC: 140 MMOL/L (ref 135–147)
URATE SERPL-MCNC: 3.7 MG/DL (ref 3.5–8.5)
WBC # BLD AUTO: 4.98 THOUSAND/UL (ref 4.31–10.16)

## 2025-03-13 PROCEDURE — 84550 ASSAY OF BLOOD/URIC ACID: CPT

## 2025-03-13 PROCEDURE — 17003 DESTRUCT PREMALG LES 2-14: CPT | Performed by: DERMATOLOGY

## 2025-03-13 PROCEDURE — 99214 OFFICE O/P EST MOD 30 MIN: CPT | Performed by: DERMATOLOGY

## 2025-03-13 PROCEDURE — 83735 ASSAY OF MAGNESIUM: CPT

## 2025-03-13 PROCEDURE — 82570 ASSAY OF URINE CREATININE: CPT

## 2025-03-13 PROCEDURE — 84156 ASSAY OF PROTEIN URINE: CPT

## 2025-03-13 PROCEDURE — 17000 DESTRUCT PREMALG LESION: CPT | Performed by: DERMATOLOGY

## 2025-03-13 PROCEDURE — 83970 ASSAY OF PARATHORMONE: CPT

## 2025-03-13 PROCEDURE — 84100 ASSAY OF PHOSPHORUS: CPT

## 2025-03-13 PROCEDURE — 80053 COMPREHEN METABOLIC PANEL: CPT

## 2025-03-13 PROCEDURE — 36415 COLL VENOUS BLD VENIPUNCTURE: CPT

## 2025-03-13 PROCEDURE — 85027 COMPLETE CBC AUTOMATED: CPT

## 2025-03-13 NOTE — PROGRESS NOTES
"[facility] Note     Patient Name: Jay Roach Jr.  Encounter Date: 03/13/2025     Have you been cared for by a [facility] Dermatologist in the last 3 years and, if so, which description applies to you?    Yes.  I have been here within the last 3 years, and my medical history has NOT changed since that time.  I am MALE/not capable of bearing children.    REVIEW OF SYSTEMS:  Have you recently had or currently have any of the following? No changes in my recent health.   PAST MEDICAL HISTORY:  Have you personally ever had or currently have any of the following?  If \"YES,\" then please provide more detail. No changes in my medical history.   HISTORY OF IMMUNOSUPPRESSION: Do you have a history of any of the following:  Systemic Immunosuppression such as Diabetes, Biologic or Immunotherapy, Chemotherapy, Organ Transplantation, Bone Marrow Transplantation or Prednisone?  No     Answering \"YES\" requires the addition of the dotphrase \"IMMUNOSUPPRESSED\" as the first diagnosis of the patient's visit.   FAMILY HISTORY:  Any \"first degree relatives\" (parent, brother, sister, or child) with the following?    No changes in my family's known health.   PATIENT EXPERIENCE:    Do you want the Dermatologist to perform a COMPLETE skin exam today including a clinical examination under the \"bra and underwear\" areas?  Yes not under underwear, not under socks   If necessary, do we have your permission to call and leave a detailed message on your Preferred Phone number that includes your specific medical information?  Yes      No Known Allergies   Current Outpatient Medications:     allopurinol (ZYLOPRIM) 300 mg tablet, Take 1 tablet (300 mg total) by mouth daily, Disp: 90 tablet, Rfl: 1    amLODIPine (NORVASC) 10 mg tablet, Take 1 tablet (10 mg total) by mouth daily, Disp: 90 tablet, Rfl: 1    atorvastatin (LIPITOR) 80 mg tablet, Take 1 tablet (80 mg total) by mouth daily at bedtime, Disp: 90 tablet, Rfl: 1    " calcitriol (ROCALTROL) 0.25 mcg capsule, TAKE 1 CAPSULE DAILY, Disp: 90 capsule, Rfl: 1    clopidogrel (PLAVIX) 75 mg tablet, TAKE 1 TABLET DAILY, Disp: 90 tablet, Rfl: 3    ferrous sulfate 325 (65 Fe) mg tablet, Take 325 mg by mouth every other day, Disp: , Rfl:     glimepiride (AMARYL) 1 mg tablet, Take 1 tablet (1 mg total) by mouth daily with breakfast, Disp: 90 tablet, Rfl: 1    hydrALAZINE (APRESOLINE) 25 mg tablet, Take 3 tablets (75 mg total) by mouth 2 (two) times a day, Disp: 540 tablet, Rfl: 3    magnesium Oxide (MAG-OX) 400 mg TABS, Take 1 tablet (400 mg total) by mouth 2 (two) times a day, Disp: 180 tablet, Rfl: 1    metoprolol tartrate (LOPRESSOR) 25 mg tablet, Take 0.5 tablets (12.5 mg total) by mouth every 12 (twelve) hours, Disp: 90 tablet, Rfl: 1    pancrelipase, Lip-Prot-Amyl, (Creon) 24,000 units, TAKE 1 CAPSULE THREE TIMES A DAY WITH MEALS, Disp: 300 capsule, Rfl: 1    pantoprazole (PROTONIX) 40 mg tablet, Take 1 tablet (40 mg total) by mouth 2 (two) times a day, Disp: 180 tablet, Rfl: 1    potassium chloride (Klor-Con M20) 20 mEq tablet, Take 1 tablet (20 mEq total) by mouth daily, Disp: 90 tablet, Rfl: 1    torsemide (DEMADEX) 10 mg tablet, Take 1 tablet (10 mg total) by mouth daily, Disp: , Rfl:           Whom besides the patient is providing clinical information about today's encounter?   NO ADDITIONAL HISTORIAN (patient alone provided history)    Physical Exam and Assessment/Plan by Diagnosis:       HISTORY OF SQUAMOUS CELL CARCINOMA      Physical Exam:  Anatomic Location Affected:  left superior helix   Morphological Description of Scar:  Well healed scar  Suspected Recurrence: no  Regional adenopathy: no     Additional History of Present Condition:  Patient had Mohs on 01/11/2023 by Dr. Tovar.     Assessment and Plan:  Based on a thorough discussion of this condition and the management approach to it (including a comprehensive discussion of the known risks, side effects and potential  benefits of treatment), the patient (family) agrees to implement the following specific plan:  When outside we recommend using a wide brim hat, sunglasses, long sleeve and pants, sunscreen with SPF 30+ with reapplication every 2 hours, or SPF specific clothing    Continue to monitor site for any changes      How can SCC be prevented?  The most important way to prevent SCC is to avoid sunburn. This is especially important in childhood and early life. Fair skinned individuals and those with a personal or family history of BCC should protect their skin from sun exposure daily, year-round and lifelong.  Stay indoors or under the shade in the middle of the day   Wear covering clothing   Apply high protection factor SPF50+ broad-spectrum sunscreens generously to exposed skin if outdoors   Avoid indoor tanning (sun beds, solaria)        What is the outlook for SCC?  Most SCC are cured by treatment. Cure is most likely if treatment is undertaken when the lesion is small. A small percent of SCC's can spread to lymph  nodes and long term monitoring is indicated.   They are also at increased risk of other skin cancers, especially melanoma. Regular self-skin examinations and long-term annual skin checks by an experienced health professional are recommended.      SQUAMOUS CELL CARCINOMA     Physical Exam:  Anatomic Location Affected:  left lower leg  Morphological Description:  well healed scar   Pertinent Positives:  Pertinent Negatives:     Additional History of Present Condition:  Patient presents for a full skin check. Patient recently had a biopsy on the left lower leg with Dr Park which proved SCC.      Case Report   Surgical Pathology Report                         Case: [id number]                                   Authorizing Provider:  Robert Bloch, MD           Collected:           09/23/2024 1521               Ordering Location:     [facility]  Received:            09/23/2024 1522                                       [place]                                                                       Pathologist:           Joshua Anderson MD                                                       Specimen:    Leg, Left, Lesion, Left leg                                                                 Final Diagnosis   A. Skin, left leg:  SQUAMOUS CELL CARCINOMA, WELL-DIFFERENTIATED     Note: The lesion extends to one lateral margin.         Assessment and Plan:  Based on a thorough discussion of this condition and the management approach to it (including a comprehensive discussion of the known risks, side effects and potential benefits of treatment), the patient (family) agrees to implement the following specific plan:  plan to monitor positive margins     What is cutaneous squamous cell carcinoma?  Cutaneous squamous cell carcinoma (SCC) is a common type of keratinocyte or non-melanoma skin cancer. It is derived from cells within the epidermis that make keratin -- the horny protein that makes up skin, hair and nails.  Cutaneous SCC is an invasive disease, referring to cancer cells that have grown beyond the epidermis. SCC can sometimes metastasise and may prove fatal.  Intraepidermal carcinoma (cutaneous SCC in situ) and mucosal SCC are considered elsewhere.     Who gets cutaneous squamous cell carcinoma?  Risk factors for cutaneous SCC include:  Age and sex: SCCs are particularly prevalent in elderly males. However, they also affect females and younger adults.   Previous SCC or another form of skin cancer (basal cell carcinoma, melanoma) are a strong predictor for further skin cancers.   Actinic keratoses   Outdoor occupation or recreation   Smoking   Fair skin, blue eyes and blond or red hair   Previous cutaneous injury, thermal burn, disease (eg cutaneous lupus, epidermolysis bullosa, leg ulcer)   Inherited syndromes: SCC is a particular problem for families with xeroderma pigmentosum and albinism   Other risk  factors include ionising radiation, exposure to arsenic, and immune suppression due to disease (eg chronic lymphocytic leukaemia) or medicines. Organ transplant recipients have a massively increased risk of developing SCC.     What causes cutaneous squamous cell carcinoma?  More than 90% of cases of SCC are associated with numerous DNA mutations in multiple somatic genes. Mutations in the p53 tumour suppressor gene are caused by exposure to ultraviolet radiation (UV), especially UVB (known as signature 7). Other signature mutations relate to cigarette smoking, ageing and immune suppression (eg, to drugs such as azathioprine). Mutations in signalling pathways affect the epidermal growth factor receptor, JEAN CLAUDE, Fyn, and [id number] signalling.   Beta-genus human papillomaviruses (wart virus) are thought to play a role in SCC arising in immune-suppressed populations. ?-HPV and HPV subtypes 5, 8, 17, 20, 24, and 38 have also been associated with an increased risk of cutaneous SCC in immunocompetent individuals.      What are the clinical features of cutaneous squamous cell carcinoma?  Cutaneous SCCs present as enlarging scaly or crusted lumps. They usually arise within pre-existing actinic keratosis or intraepidermal carcinoma.  They grow over weeks to months   They may ulcerate   They are often tender or painful   Located on sun-exposed sites, particularly the face, lips, ears, hands, forearms and lower legs   Size varies from a few millimetres to several centimetres in diameter.     Types of cutaneous squamous cell carcinoma  Distinct clinical types of invasive cutaneous SCC include:  Cutaneous horn -- the horn is due to excessive production of keratin   Keratoacanthoma (KA) -- a rapidly growing keratinising nodule that may resolve without treatment   Carcinoma cuniculatum (‘verrucous carcinoma’), a slow-growing, warty tumour on the sole of the foot.   Multiple eruptive SCC/KA-like lesions arising in syndromes, such as  multiple self-healing squamous epitheliomas of Robert-Smith and Grzybowski syndrome  The pathologist may classify a tumour as well differentiated, moderately well differentiated, poorly differentiated or anaplastic cutaneous SCC. There are other variants.     Classification of squamous cell carcinoma by risk  Cutaneous SCC is classified as low-risk or high-risk, depending on the chance of tumour recurrence and metastasis. Characteristics of high-risk SCC include:  High-risk cutaneous squamous cell carcinoma has the following characteristics:  Diameter greater than or equal to 2 cm   Location on the ear, vermilion of the lip, central face, hands, feet, genitalia   Arising in elderly or immune suppressed patient   Histological thickness greater than 2 mm, poorly differentiated histology, or with the invasion of the subcutaneous tissue, nerves and blood vessels  Metastatic SCC is found in regional lymph nodes (80%), lungs, liver, brain, bones and skin.     Staging cutaneous squamous cell carcinoma  In 2011, the American Joint Committee on Cancer (AJCC) published a new staging systemic for cutaneous SCC for the 7th Edition of the AJCC manual. This evaluates the dimensions of the original primary tumour (T) and its metastases to lymph nodes (N).     Tumour staging for cutaneous SCC  TX: Th Primary tumour cannot be assessed  T0: No evidence of a primary tumour  Tis: Carcinoma in situ  T1: Tumour <= 2cm without high-risk features  T2: Tumour >= 2cm; or; Tumour <= 2 cm with high-risk features  T3: Tumour with the invasion of maxilla, mandible, orbit or temporal bone  T4: Tumour with the invasion of axial or appendicular skeleton or perineural invasion of skull base     Paul staging for cutaneous SCC  NX: Regional lymph nodes cannot be assessed  N0: No regional lymph node metastasis  N1: Metastasis in one local lymph node <= 3cm  N2: Metastasis in one local lymph node >= 3cm; or; Metastasis in >1 local lymph node <=  6cm  N3: Metastasis in lymph node >= 6cm     How is squamous cell carcinoma diagnosed?  Diagnosis of cutaneous SCC is based on clinical features. The diagnosis and histological subtype are confirmed pathologically by diagnostic biopsy or following excision. See squamous cell carcinoma - pathology.  Patients with high-risk SCC may also undergo staging investigations to determine whether it has spread to lymph nodes or elsewhere. These may include:  Imaging using ultrasound scan, X-rays, CT scans, MRI scans   Lymph node or other tissue biopsies     What is the treatment for cutaneous squamous cell carcinoma?  Cutaneous SCC is nearly always treated surgically. Most cases are excised with a 3-10 mm margin of normal tissue around a visible tumour. A flap or skin graft may be needed to repair the defect.  Other methods of removal include:  Shave, curettage, and electrocautery for low-risk tumours on trunk and limbs   Aggressive cryotherapy for very small, thin, low-risk tumours   Mohs micrographic surgery for large facial lesions with indistinct margins or recurrent tumours   Radiotherapy for an inoperable tumour, patients unsuitable for surgery, or as adjuvant     What is the treatment for advanced or metastatic squamous cell carcinoma?  Locally advanced primary, recurrent or metastatic SCC requires multidisciplinary consultation. Often a combination of treatments is used.  Surgery   Radiotherapy   Cemiplimab   Experimental targeted therapy using epidermal growth factor receptor inhibitors     How can cutaneous squamous cell carcinoma be prevented?  There is a great deal of evidence to show that very careful sun protection at any time of life reduces the number of SCCs. This is particularly important in ageing, sun-damaged, fair skin; in patients that are immune suppressed; and in those who already have actinic keratoses or previous SCC.  Stay indoors or under the shade in the middle of the day   Wear covering clothing    Apply high protection factor SPF50+ broad-spectrum sunscreens generously to exposed skin if outdoors   Avoid indoor tanning (sun beds, solaria)     Oral nicotinamide (vitamin B3) in a dose of 500 mg twice daily may reduce the number and severity of SCCs in people at high risk.  Patients with multiple squamous cell carcinomas may be prescribed an oral retinoid (acitretin or isotretinoin). These reduce the number of tumours but have some nuisance side effects.     What is the outlook for cutaneous squamous cell carcinoma?  Most SCCs are cured by treatment. A cure is most likely if treatment is undertaken when the lesion is small. The risk of recurrence or disease-associated death is greater for tumours that are > 20 mm in diameter and/or > 2 mm in thickness at the time of surgical excision.  About 50% of people at high risk of SCC develop a second one within 5 years of the first. They are also at increased risk of other skin cancers, especially melanoma. Regular self-skin examinations and long-term annual skin checks by an experienced health professional are recommended.     ACTINIC KERATOSIS    Physical Exam:  Anatomic Location Affected:  scalp, forehead  Morphological Description:  Scaly pink papules  Pertinent Positives:  Pertinent Negatives:      Assessment and Plan:  Based on a thorough discussion of this condition and the management approach to it (including a comprehensive discussion of the known risks, side effects and potential benefits of treatment), the patient (family) agrees to implement the following specific plan:  When outside we recommend using a wide brim hat, sunglasses, long sleeve and pants, sunscreen with SPF 30+ with reapplication every 2 hours, or SPF specific clothing   liquid nitrogen to treat areas. Consent obtained. Expect area to blister, crust, and then fall off within 2 weeks. Please use vaseline.                                     PROCEDURE:  DESTRUCTION OF PRE-MALIGNANT  "LESIONS  After a thorough discussion of treatment options and risk/benefits/alternatives (including but not limited to local pain, scarring, dyspigmentation, blistering, and possible superinfection), verbal and written consent were obtained and the aforementioned lesions were treated on with cryotherapy using liquid nitrogen x 1 cycle for 5-10 seconds.    TOTAL NUMBER of 5 pre-malignant lesions were treated today on the ANATOMIC LOCATION: scalp, forehead.     The patient tolerated the procedure well, and after-care instructions were provided.        MELANOCYTIC NEVI (\"Moles\")    Physical Exam:  Anatomic Location Affected:   Mostly on sun-exposed areas of the trunk and extremities  Morphological Description:  Scattered, 1-4mm round to ovoid, symmetrical-appearing, even bordered, skin colored to dark brown macules/papules, mostly in sun-exposed areas  Pertinent Positives:  Pertinent Negatives:    Additional History of Present Condition:      Assessment and Plan:  Based on a thorough discussion of this condition and the management approach to it (including a comprehensive discussion of the known risks, side effects and potential benefits of treatment), the patient (family) agrees to implement the following specific plan:  When outside we recommend using a wide brim hat, sunglasses, long sleeve and pants, sunscreen with SPF 30+ with reapplication every 2 hours, or SPF specific clothing   Benign, reassured  Annual skin check     Melanocytic Nevi  Melanocytic nevi (\"moles\") are tan or brown, raised or flat areas of the skin which have an increased number of melanocytes. Melanocytes are the cells in our body which make pigment and account for skin color.    Some moles are present at birth (I.e., \"congenital nevi\"), while others come up later in life (i.e., \"acquired nevi\").  The sun can stimulate the body to make more moles.  Sunburns are not the only thing that triggers more moles.  Chronic sun exposure can do it too. " "    Clinically distinguishing a healthy mole from melanoma may be difficult, even for experienced dermatologists. The \"ABCDE's\" of moles have been suggested as a means of helping to alert a person to a suspicious mole and the possible increased risk of melanoma.  The suggestions for raising alert are as follows:    Asymmetry: Healthy moles tend to be symmetric, while melanomas are often asymmetric.  Asymmetry means if you draw a line through the mole, the two halves do not match in color, size, shape, or surface texture. Asymmetry can be a result of rapid enlargement of a mole, the development of a raised area on a previously flat lesion, scaling, ulceration, bleeding or scabbing within the mole.  Any mole that starts to demonstrate \"asymmetry\" should be examined promptly by a board certified dermatologist.     Border: Healthy moles tend to have discrete, even borders.  The border of a melanoma often blends into the normal skin and does not sharply delineate the mole from normal skin.  Any mole that starts to demonstrate \"uneven borders\" should be examined promptly by a board certified dermatologist.     Color: Healthy moles tend to be one color throughout.  Melanomas tend to be made up of different colors ranging from dark black, blue, white, or red.  Any mole that demonstrates a color change should be examined promptly by a board certified dermatologist.     Diameter: Healthy moles tend to be smaller than 0.6 cm in size; an exception are \"congenital nevi\" that can be larger.  Melanomas tend to grow and can often be greater than 0.6 cm (1/4 of an inch, or the size of a pencil eraser). This is only a guideline, and many normal moles may be larger than 0.6 cm without being unhealthy.  Any mole that starts to change in size (small to bigger or bigger to smaller) should be examined promptly by a board certified dermatologist.     Evolving: Healthy moles tend to \"stay the same.\"  Melanomas may often show signs of change " or evolution such as a change in size, shape, color, or elevation.  Any mole that starts to itch, bleed, crust, burn, hurt, or ulcerate or demonstrate a change or evolution should be examined promptly by a board certified dermatologist.        LENTIGO    Physical Exam:  Anatomic Location Affected:  trunk, arms  Morphological Description:  Light brown macules  Pertinent Positives:  Pertinent Negatives:    Additional History of Present Condition:      Assessment and Plan:  Based on a thorough discussion of this condition and the management approach to it (including a comprehensive discussion of the known risks, side effects and potential benefits of treatment), the patient (family) agrees to implement the following specific plan:  When outside we recommend using a wide brim hat, sunglasses, long sleeve and pants, sunscreen with SPF 30+ with reapplication every 2 hours, or SPF specific clothing       What is a lentigo?  A lentigo is a pigmented flat or slightly raised lesion with a clearly defined edge. Unlike an ephelis (freckle), it does not fade in the winter months. There are several kinds of lentigo.  The name lentigo originally referred to its appearance resembling a small lentil. The plural of lentigo is lentigines, although “lentigos” is also in common use.    Who gets lentigines?  Lentigines can affect males and females of all ages and races. Solar lentigines are especially prevalent in fair skinned adults. Lentigines associated with syndromes are present at birth or arise during childhood.    What causes lentigines?  Common forms of lentigo are due to exposure to ultraviolet radiation:  Sun damage including sunburn   Indoor tanning   Phototherapy, especially photochemotherapy (PUVA)    Ionizing radiation, eg radiation therapy, can also cause lentigines.  Several familial syndromes associated with widespread lentigines originate from mutations in Aung-MAP kinase, mTOR signaling and PTEN pathways.    What is the  "treatment for lentigines?  Most lentigines are left alone. Attempts to lighten them may not be successful. The following approaches are used:  SPF 50+ broad-spectrum sunscreen   Hydroquinone bleaching cream   Alpha hydroxy acids   Vitamin C   Retinoids   Azelaic acid   Chemical peels  Individual lesions can be permanently removed using:  Cryotherapy   Intense pulsed light   Pigment lasers    How can lentigines be prevented?  Lentigines associated with exposure ultraviolet radiation can be prevented by very careful sun protection. Clothing is more successful at preventing new lentigines than are sunscreens.    What is the outlook for lentigines?  Lentigines usually persist. They may increase in number with age and sun exposure. Some in sun-protected sites may fade and disappear.    RINALDI ANGIOMAS    Physical Exam:  Anatomic Location Affected:  trunk  Morphological Description:  Scattered cherry red, 1-4 mm papules.  Pertinent Positives:  Pertinent Negatives:    Additional History of Present Condition:      Assessment and Plan:  Based on a thorough discussion of this condition and the management approach to it (including a comprehensive discussion of the known risks, side effects and potential benefits of treatment), the patient (family) agrees to implement the following specific plan:  Monitor for changes  Benign, reassured      Assessment and Plan:    Cherry angioma, also known as Mcclelland de Tunde spots, are benign vascular skin lesions. A \"cherry angioma\" is a firm red, blue or purple papule, 0.1-1 cm in diameter. When thrombosed, they can appear black in colour until evaluated with a dermatoscope when the red or purple colour is more easily seen. Cherry angioma may develop on any part of the body but most often appear on the scalp, face, lips and trunk.  An angioma is due to proliferating endothelial cells; these are the cells that line the inside of a blood vessel.    Angiomas can arise in early life or later " "in life; the most common type of angioma is a cherry angioma.  Cherry angiomas are very common in males and females of any age or race. They are more noticeable in white skin than in skin of colour. They markedly increase in number from about the age of 40. There may be a family history of similar lesions. Eruptive cherry angiomas have been rarely reported to be associated with internal malignancy. The cause of angiomas is unknown. Genetic analysis of cherry angiomas has shown that they frequently carry specific somatic missense mutations in the GNAQ and GNA11 (Q209H) genes, which are involved in other vascular and melanocytic proliferations.      SEBORRHEIC KERATOSIS; NON-INFLAMED    Physical Exam:  Anatomic Location Affected:  trunk  Morphological Description:  Flat and raised, waxy, smooth to warty textured, yellow to brownish-grey to dark brown to blackish, discrete, \"stuck-on\" appearing papules.  Pertinent Positives:  Pertinent Negatives:    Additional History of Present Condition:      Assessment and Plan:  Based on a thorough discussion of this condition and the management approach to it (including a comprehensive discussion of the known risks, side effects and potential benefits of treatment), the patient (family) agrees to implement the following specific plan:  Monitor for changes  Benign, reassured      Seborrheic Keratosis  A seborrheic keratosis is a harmless warty spot that appears during adult life as a common sign of skin aging.  Seborrheic keratoses can arise on any area of skin, covered or uncovered, with the usual exception of the palms and soles. They do not arise from mucous membranes. Seborrheic keratoses can have highly variable appearance.      Seborrheic keratoses are extremely common. It has been estimated that over 90% of adults over the age of 60 years have one or more of them. They occur in males and females of all races, typically beginning to erupt in the 30s or 40s. They are uncommon " "under the age of 20 years.  The precise cause of seborrhoeic keratoses is not known.  Seborrhoeic keratoses are considered degenerative in nature. As time goes by, seborrheic keratoses tend to become more numerous. Some people inherit a tendency to develop a very large number of them; some people may have hundreds of them.      There is no easy way to remove multiple lesions on a single occasion.  Unless a specific lesion is \"inflamed\" and is causing pain or stinging/burning or is bleeding, most insurance companies do not authorize treatment.    XEROSIS (\"DRY SKIN\")    Physical Exam:  Anatomic Location Affected:  diffuse  Morphological Description:  xerosis  Pertinent Positives:  Pertinent Negatives:    Additional History of Present Condition:      Assessment and Plan:  Based on a thorough discussion of this condition and the management approach to it (including a comprehensive discussion of the known risks, side effects and potential benefits of treatment), the patient (family) agrees to implement the following specific plan:  Use moisturizer like Eucerin,Cerave or Aveeno Cream 3 times a day for the dry skin            Dry skin refers to skin that feels dry to touch. Dry skin has a dull surface with a rough, scaly quality. The skin is less pliable and cracked. When dryness is severe, the skin may become inflamed and fissured.  Although any body site can be dry, dry skin tends to affect the shins more than any other site.    Dry skin is lacking moisture in the outer horny cell layer (stratum corneum) and this results in cracks in the skin surface.  Dry skin is also called xerosis, xeroderma or asteatosis (lack of fat).  It can affect males and females of all ages. There is some racial variability in water and lipid content of the skin.  Dry skin that starts in early childhood may be one of about 20 types of ichthyosis (fish-scale skin). There is often a family history of dry skin.   Dry skin is commonly seen in " people with atopic dermatitis.  Nearly everyone > 60 years has dry skin.    Dry skin that begins later may be seen in people with certain diseases and conditions.  Postmenopausal women  Hypothyroidism  Chronic renal disease   Malnutrition and weight loss   Subclinical dermatitis   Treatment with certain drugs such as oral retinoids, diuretics and epidermal growth factor receptor inhibitors      What is the treatment for dry skin?  The mainstay of treatment of dry skin and ichthyosis is moisturisers/emollients. They should be applied liberally and often enough to:  Reduce itch   Improve the barrier function   Prevent entry of irritants, bacteria   Reduce transepidermal water loss.      How can dry skin be prevented?  Eliminate aggravating factors:  Reduce the frequency of bathing.   A humidifier in winter and air conditioner in summer   Compare having a short shower with a prolonged soak in a bath.   Use lukewarm, not hot, water.   Replace standard soap with a substitute such as a synthetic detergent cleanser, water-miscible emollient, bath oil, anti-pruritic tar oil, colloidal oatmeal etc.   Apply an emollient liberally and often, particularly shortly after bathing, and when itchy. The drier the skin, the thicker this should be, especially on the hands.    What is the outlook for dry skin?  A tendency to dry skin may persist life-long, or it may improve once contributing factors are controlled.     Scribe Attestation      I,:  Jesusita Lance am acting as a scribe while in the presence of the attending physician.:       I,:  Jaqui Bauer MD personally performed the services described in this documentation    as scribed in my presence.:             Include The Following Details In The Note (If No Details Will Only Be Reflected In The Surgical Fax): Yes

## 2025-03-13 NOTE — PATIENT INSTRUCTIONS
"MELANOCYTIC NEVI (\"Moles\")    Physical Exam:  Anatomic Location Affected:   Mostly on sun-exposed areas of the trunk and extremities  Morphological Description:  Scattered, 1-4mm round to ovoid, symmetrical-appearing, even bordered, skin colored to dark brown macules/papules, mostly in sun-exposed areas  Pertinent Positives:  Pertinent Negatives:    Additional History of Present Condition:      Assessment and Plan:  Based on a thorough discussion of this condition and the management approach to it (including a comprehensive discussion of the known risks, side effects and potential benefits of treatment), the patient (family) agrees to implement the following specific plan:  When outside we recommend using a wide brim hat, sunglasses, long sleeve and pants, sunscreen with SPF 30+ with reapplication every 2 hours, or SPF specific clothing   Benign, reassured  Annual skin check     Melanocytic Nevi  Melanocytic nevi (\"moles\") are tan or brown, raised or flat areas of the skin which have an increased number of melanocytes. Melanocytes are the cells in our body which make pigment and account for skin color.    Some moles are present at birth (I.e., \"congenital nevi\"), while others come up later in life (i.e., \"acquired nevi\").  The sun can stimulate the body to make more moles.  Sunburns are not the only thing that triggers more moles.  Chronic sun exposure can do it too.     Clinically distinguishing a healthy mole from melanoma may be difficult, even for experienced dermatologists. The \"ABCDE's\" of moles have been suggested as a means of helping to alert a person to a suspicious mole and the possible increased risk of melanoma.  The suggestions for raising alert are as follows:    Asymmetry: Healthy moles tend to be symmetric, while melanomas are often asymmetric.  Asymmetry means if you draw a line through the mole, the two halves do not match in color, size, shape, or surface texture. Asymmetry can be a result of " "rapid enlargement of a mole, the development of a raised area on a previously flat lesion, scaling, ulceration, bleeding or scabbing within the mole.  Any mole that starts to demonstrate \"asymmetry\" should be examined promptly by a board certified dermatologist.     Border: Healthy moles tend to have discrete, even borders.  The border of a melanoma often blends into the normal skin and does not sharply delineate the mole from normal skin.  Any mole that starts to demonstrate \"uneven borders\" should be examined promptly by a board certified dermatologist.     Color: Healthy moles tend to be one color throughout.  Melanomas tend to be made up of different colors ranging from dark black, blue, white, or red.  Any mole that demonstrates a color change should be examined promptly by a board certified dermatologist.     Diameter: Healthy moles tend to be smaller than 0.6 cm in size; an exception are \"congenital nevi\" that can be larger.  Melanomas tend to grow and can often be greater than 0.6 cm (1/4 of an inch, or the size of a pencil eraser). This is only a guideline, and many normal moles may be larger than 0.6 cm without being unhealthy.  Any mole that starts to change in size (small to bigger or bigger to smaller) should be examined promptly by a board certified dermatologist.     Evolving: Healthy moles tend to \"stay the same.\"  Melanomas may often show signs of change or evolution such as a change in size, shape, color, or elevation.  Any mole that starts to itch, bleed, crust, burn, hurt, or ulcerate or demonstrate a change or evolution should be examined promptly by a board certified dermatologist.        LENTIGO    Physical Exam:  Anatomic Location Affected:  trunk, arms  Morphological Description:  Light brown macules  Pertinent Positives:  Pertinent Negatives:    Additional History of Present Condition:      Assessment and Plan:  Based on a thorough discussion of this condition and the management approach to " it (including a comprehensive discussion of the known risks, side effects and potential benefits of treatment), the patient (family) agrees to implement the following specific plan:  When outside we recommend using a wide brim hat, sunglasses, long sleeve and pants, sunscreen with SPF 30+ with reapplication every 2 hours, or SPF specific clothing       What is a lentigo?  A lentigo is a pigmented flat or slightly raised lesion with a clearly defined edge. Unlike an ephelis (freckle), it does not fade in the winter months. There are several kinds of lentigo.  The name lentigo originally referred to its appearance resembling a small lentil. The plural of lentigo is lentigines, although “lentigos” is also in common use.    Who gets lentigines?  Lentigines can affect males and females of all ages and races. Solar lentigines are especially prevalent in fair skinned adults. Lentigines associated with syndromes are present at birth or arise during childhood.    What causes lentigines?  Common forms of lentigo are due to exposure to ultraviolet radiation:  Sun damage including sunburn   Indoor tanning   Phototherapy, especially photochemotherapy (PUVA)    Ionizing radiation, eg radiation therapy, can also cause lentigines.  Several familial syndromes associated with widespread lentigines originate from mutations in Aung-MAP kinase, mTOR signaling and PTEN pathways.    What is the treatment for lentigines?  Most lentigines are left alone. Attempts to lighten them may not be successful. The following approaches are used:  SPF 50+ broad-spectrum sunscreen   Hydroquinone bleaching cream   Alpha hydroxy acids   Vitamin C   Retinoids   Azelaic acid   Chemical peels  Individual lesions can be permanently removed using:  Cryotherapy   Intense pulsed light   Pigment lasers    How can lentigines be prevented?  Lentigines associated with exposure ultraviolet radiation can be prevented by very careful sun protection. Clothing is more  "successful at preventing new lentigines than are sunscreens.    What is the outlook for lentigines?  Lentigines usually persist. They may increase in number with age and sun exposure. Some in sun-protected sites may fade and disappear.    RINALDI ANGIOMAS    Physical Exam:  Anatomic Location Affected:  trunk  Morphological Description:  Scattered cherry red, 1-4 mm papules.  Pertinent Positives:  Pertinent Negatives:    Additional History of Present Condition:      Assessment and Plan:  Based on a thorough discussion of this condition and the management approach to it (including a comprehensive discussion of the known risks, side effects and potential benefits of treatment), the patient (family) agrees to implement the following specific plan:  Monitor for changes  Benign, reassured      Assessment and Plan:    Cherry angioma, also known as Mcclelland de [place] spots, are benign vascular skin lesions. A \"cherry angioma\" is a firm red, blue or purple papule, 0.1-1 cm in diameter. When thrombosed, they can appear black in colour until evaluated with a dermatoscope when the red or purple colour is more easily seen. Cherry angioma may develop on any part of the body but most often appear on the scalp, face, lips and trunk.  An angioma is due to proliferating endothelial cells; these are the cells that line the inside of a blood vessel.    Angiomas can arise in early life or later in life; the most common type of angioma is a cherry angioma.  Cherry angiomas are very common in males and females of any age or race. They are more noticeable in white skin than in skin of colour. They markedly increase in number from about the age of 40. There may be a family history of similar lesions. Eruptive cherry angiomas have been rarely reported to be associated with internal malignancy. The cause of angiomas is unknown. Genetic analysis of cherry angiomas has shown that they frequently carry specific somatic missense mutations in the " "GNAQ and GNA11 (Q209H) genes, which are involved in other vascular and melanocytic proliferations.      SEBORRHEIC KERATOSIS; NON-INFLAMED    Physical Exam:  Anatomic Location Affected:  trunk  Morphological Description:  Flat and raised, waxy, smooth to warty textured, yellow to brownish-grey to dark brown to blackish, discrete, \"stuck-on\" appearing papules.  Pertinent Positives:  Pertinent Negatives:    Additional History of Present Condition:      Assessment and Plan:  Based on a thorough discussion of this condition and the management approach to it (including a comprehensive discussion of the known risks, side effects and potential benefits of treatment), the patient (family) agrees to implement the following specific plan:  Monitor for changes  Benign, reassured      Seborrheic Keratosis  A seborrheic keratosis is a harmless warty spot that appears during adult life as a common sign of skin aging.  Seborrheic keratoses can arise on any area of skin, covered or uncovered, with the usual exception of the palms and soles. They do not arise from mucous membranes. Seborrheic keratoses can have highly variable appearance.      Seborrheic keratoses are extremely common. It has been estimated that over 90% of adults over the age of 60 years have one or more of them. They occur in males and females of all races, typically beginning to erupt in the 30s or 40s. They are uncommon under the age of 20 years.  The precise cause of seborrhoeic keratoses is not known.  Seborrhoeic keratoses are considered degenerative in nature. As time goes by, seborrheic keratoses tend to become more numerous. Some people inherit a tendency to develop a very large number of them; some people may have hundreds of them.      There is no easy way to remove multiple lesions on a single occasion.  Unless a specific lesion is \"inflamed\" and is causing pain or stinging/burning or is bleeding, most insurance companies do not authorize " "treatment.    XEROSIS (\"DRY SKIN\")    Physical Exam:  Anatomic Location Affected:  diffuse  Morphological Description:  xerosis  Pertinent Positives:  Pertinent Negatives:    Additional History of Present Condition:      Assessment and Plan:  Based on a thorough discussion of this condition and the management approach to it (including a comprehensive discussion of the known risks, side effects and potential benefits of treatment), the patient (family) agrees to implement the following specific plan:  Use moisturizer like Eucerin,Cerave or Aveeno Cream 3 times a day for the dry skin            Dry skin refers to skin that feels dry to touch. Dry skin has a dull surface with a rough, scaly quality. The skin is less pliable and cracked. When dryness is severe, the skin may become inflamed and fissured.  Although any body site can be dry, dry skin tends to affect the shins more than any other site.    Dry skin is lacking moisture in the outer horny cell layer (stratum corneum) and this results in cracks in the skin surface.  Dry skin is also called xerosis, xeroderma or asteatosis (lack of fat).  It can affect males and females of all ages. There is some racial variability in water and lipid content of the skin.  Dry skin that starts in early childhood may be one of about 20 types of ichthyosis (fish-scale skin). There is often a family history of dry skin.   Dry skin is commonly seen in people with atopic dermatitis.  Nearly everyone > 60 years has dry skin.    Dry skin that begins later may be seen in people with certain diseases and conditions.  Postmenopausal women  Hypothyroidism  Chronic renal disease   Malnutrition and weight loss   Subclinical dermatitis   Treatment with certain drugs such as oral retinoids, diuretics and epidermal growth factor receptor inhibitors      What is the treatment for dry skin?  The mainstay of treatment of dry skin and ichthyosis is moisturisers/emollients. They should be applied " liberally and often enough to:  Reduce itch   Improve the barrier function   Prevent entry of irritants, bacteria   Reduce transepidermal water loss.      How can dry skin be prevented?  Eliminate aggravating factors:  Reduce the frequency of bathing.   A humidifier in winter and air conditioner in summer   Compare having a short shower with a prolonged soak in a bath.   Use lukewarm, not hot, water.   Replace standard soap with a substitute such as a synthetic detergent cleanser, water-miscible emollient, bath oil, anti-pruritic tar oil, colloidal oatmeal etc.   Apply an emollient liberally and often, particularly shortly after bathing, and when itchy. The drier the skin, the thicker this should be, especially on the hands.    What is the outlook for dry skin?  A tendency to dry skin may persist life-long, or it may improve once contributing factors are controlled.

## 2025-03-16 NOTE — RESULT ENCOUNTER NOTE
Please inform patient that renal function is stable at creatinine 2.79 mg/dL, urine protein is slightly improving.  Hemoglobin is improving to 9.4 g/dL.  Please order for repeat BMP for chronic kidney disease stage IV to be done before office visit in May

## 2025-03-17 NOTE — TELEPHONE ENCOUNTER
I called Jay and advised of labs if he has any questions asked to call back. BMPin epic due before May visit.       ----- Message from Marcel Muñoz MD sent at 3/16/2025  3:04 PM EDT -----  Please inform patient that renal function is stable at creatinine 2.79 mg/dL, urine protein is slightly improving.  Hemoglobin is improving to 9.4 g/dL.  Please order for repeat BMP for chronic kidney disease stage IV to be done before office visit in May

## 2025-03-25 ENCOUNTER — HOSPITAL ENCOUNTER (OUTPATIENT)
Dept: RADIOLOGY | Facility: MEDICAL CENTER | Age: 80
Discharge: HOME | End: 2025-03-25
Payer: MEDICARE

## 2025-03-25 DIAGNOSIS — C34.32 PRIMARY NON-SMALL CELL CARCINOMA OF LOWER LOBE OF LEFT LUNG (HCC): ICD-10-CM

## 2025-03-25 PROCEDURE — 71250 CT THORAX DX C-: CPT

## 2025-03-31 ENCOUNTER — OFFICE VISIT (OUTPATIENT)
Dept: FAMILY MEDICINE CLINIC | Facility: CLINIC | Age: 80
End: 2025-03-31
Payer: MEDICARE

## 2025-03-31 VITALS
WEIGHT: 171 LBS | BODY MASS INDEX: 24.48 KG/M2 | DIASTOLIC BLOOD PRESSURE: 48 MMHG | SYSTOLIC BLOOD PRESSURE: 150 MMHG | HEART RATE: 49 BPM | HEIGHT: 70 IN | OXYGEN SATURATION: 98 % | TEMPERATURE: 97.9 F

## 2025-03-31 DIAGNOSIS — N18.9 ACUTE KIDNEY INJURY SUPERIMPOSED ON CHRONIC KIDNEY DISEASE  (HCC): Primary | ICD-10-CM

## 2025-03-31 DIAGNOSIS — N18.4 TYPE 2 DIABETES MELLITUS WITH STAGE 4 CHRONIC KIDNEY DISEASE, WITHOUT LONG-TERM CURRENT USE OF INSULIN (HCC): ICD-10-CM

## 2025-03-31 DIAGNOSIS — R26.2 AMBULATORY DYSFUNCTION: ICD-10-CM

## 2025-03-31 DIAGNOSIS — K86.1 CHRONIC PANCREATITIS, UNSPECIFIED PANCREATITIS TYPE (HCC): ICD-10-CM

## 2025-03-31 DIAGNOSIS — N18.4 CONTROLLED TYPE 2 DIABETES MELLITUS WITH STAGE 4 CHRONIC KIDNEY DISEASE, WITHOUT LONG-TERM CURRENT USE OF INSULIN (HCC): ICD-10-CM

## 2025-03-31 DIAGNOSIS — G47.00 INSOMNIA, UNSPECIFIED TYPE: ICD-10-CM

## 2025-03-31 DIAGNOSIS — F17.219 CIGARETTE NICOTINE DEPENDENCE WITH NICOTINE-INDUCED DISORDER: ICD-10-CM

## 2025-03-31 DIAGNOSIS — I50.33 ACUTE ON CHRONIC HEART FAILURE WITH PRESERVED EJECTION FRACTION (HFPEF) (HCC): ICD-10-CM

## 2025-03-31 DIAGNOSIS — D63.1 ANEMIA DUE TO STAGE 4 CHRONIC KIDNEY DISEASE  (HCC): ICD-10-CM

## 2025-03-31 DIAGNOSIS — I65.23 ASYMPTOMATIC BILATERAL CAROTID ARTERY STENOSIS: Chronic | ICD-10-CM

## 2025-03-31 DIAGNOSIS — E11.22 TYPE 2 DIABETES MELLITUS WITH STAGE 4 CHRONIC KIDNEY DISEASE, WITHOUT LONG-TERM CURRENT USE OF INSULIN (HCC): ICD-10-CM

## 2025-03-31 DIAGNOSIS — K86.81 EXOCRINE PANCREATIC INSUFFICIENCY: ICD-10-CM

## 2025-03-31 DIAGNOSIS — J96.01 ACUTE RESPIRATORY FAILURE WITH HYPOXIA (HCC): ICD-10-CM

## 2025-03-31 DIAGNOSIS — I25.10 CORONARY ARTERY DISEASE INVOLVING NATIVE CORONARY ARTERY OF NATIVE HEART WITHOUT ANGINA PECTORIS: ICD-10-CM

## 2025-03-31 DIAGNOSIS — K21.9 GASTROESOPHAGEAL REFLUX DISEASE WITHOUT ESOPHAGITIS: ICD-10-CM

## 2025-03-31 DIAGNOSIS — N17.9 ACUTE KIDNEY INJURY SUPERIMPOSED ON CHRONIC KIDNEY DISEASE  (HCC): Primary | ICD-10-CM

## 2025-03-31 DIAGNOSIS — E11.22 CONTROLLED TYPE 2 DIABETES MELLITUS WITH STAGE 4 CHRONIC KIDNEY DISEASE, WITHOUT LONG-TERM CURRENT USE OF INSULIN (HCC): ICD-10-CM

## 2025-03-31 DIAGNOSIS — Z95.2 S/P TAVR (TRANSCATHETER AORTIC VALVE REPLACEMENT): ICD-10-CM

## 2025-03-31 DIAGNOSIS — C34.32 PRIMARY NON-SMALL CELL CARCINOMA OF LOWER LOBE OF LEFT LUNG (HCC): ICD-10-CM

## 2025-03-31 DIAGNOSIS — E78.2 MIXED HYPERLIPIDEMIA: ICD-10-CM

## 2025-03-31 DIAGNOSIS — N18.4 ANEMIA DUE TO STAGE 4 CHRONIC KIDNEY DISEASE  (HCC): ICD-10-CM

## 2025-03-31 PROCEDURE — 99214 OFFICE O/P EST MOD 30 MIN: CPT | Performed by: FAMILY MEDICINE

## 2025-03-31 PROCEDURE — G2211 COMPLEX E/M VISIT ADD ON: HCPCS | Performed by: FAMILY MEDICINE

## 2025-03-31 NOTE — PROGRESS NOTES
Name: Jay Roach Jr.      : 1945      MRN: [id number]  Encounter Provider: Simón Hadley MD  Encounter Date: 3/31/2025   Encounter department: [facility]  :  Assessment & Plan  Acute kidney injury superimposed on chronic kidney disease  (HCC)  Lab Results   Component Value Date    EGFR 20 2025    EGFR 20 2025    EGFR 19 2025    CREATININE 2.79 (H) 2025    CREATININE 2.78 (H) 2025    CREATININE 2.91 (H) 2025   Pt to avoid NSAIDs and any IV dyes. Patient to follow up eoither with nephrology or  with us for  further  monitoring of  renal function.        Acute on chronic heart failure with preserved ejection fraction (HFpEF) ([facility])  Wt Readings from Last 3 Encounters:   25 77.6 kg (171 lb)   25 74.8 kg (164 lb 12.8 oz)   25 74.4 kg (164 lb)   Patient is stable  and will continue present plan of care and reassess at next routine visit. All questions about this problem from patient were answered today.                Ambulatory dysfunction  Poor ambulation       Anemia due to stage 4 chronic kidney disease  (HCC)  Patient is stable  and will continue present plan of care and reassess at next routine visit. All questions about this problem from patient were answered today.        Asymptomatic bilateral carotid artery stenosis  Patient is stable  and will continue present plan of care and reassess at next routine visit. All questions about this problem from patient were answered today.        Chronic pancreatitis, unspecified pancreatitis type (HCC)  Patient is stable  and will continue present plan of care and reassess at next routine visit. All questions about this problem from patient were answered today.        Cigarette nicotine dependence with nicotine-induced disorder  Patient encouraged to quit using tobacco for that increases their risk for COPD, lung cancer,stroke, oral cancer and heart disease. If patient does  not want to quit they should let me know  when they are interested in quitting. There are numerous options to use to quit and we can discuss them.        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 Pt  recommended to see eye doctor and foot doctor for routine screening as per protocol. Recheck A1C  and Cr in 3 months. Patient questions answered today about this condtion.   Lab Results   Component Value Date    HGBA1C 6.6 (H) 12/24/2024          Coronary artery disease involving native coronary artery of native heart without angina pectoris  Patient to continue  with current cardiac meds to decrease risk of re stenosis. Patient to follow up with cardiology for scheduled appointments to decrease risk for further cardiac problems with appropriate diagnostic testing to reassess cardiac status. Patient had alll questions about this problem answered today.        Gastroesophageal reflux disease without esophagitis  Patient to continue with present therapy and decrease caffeine, avoid ETOH and smoking to decrease acid production. Pt should also cease eating prior to bedtime and avoid excessive fluid intake prior to sleep. May use antacids as needed for breakthrough GERD. All pateint questions answered today about this condition.        Mixed hyperlipidemia  Patient  is stable with current medication and we discussed a low fat low cholesterol diet. Weight loss also discussed for this will help lower cholesterol also. Recheck lipids in 6 months.        Insomnia, unspecified type  Discussed with patient use of sedative  medications and good  sleep hygiene to maximize treatment for this problem. Pt  to use sedatives only prior to sleep and cautioned them about usage at any other time. All patient questions about this problem answered today.        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 All questions about this problem from patient were answered today.        S/P TAVR (transcatheter aortic valve replacement)  Patient is stable  and will continue present plan of care and reassess at next routine visit. All questions about this problem from patient were answered today.        Type 2 diabetes mellitus with stage 4 chronic kidney disease, without long-term current use of insulin (HCC)  Patient is stable with current meds and discussed a low carb diet. Pt  recommended to see eye doctor and foot doctor for routine screening as per protocol. Recheck A1C  and Cr in 3 months. Patient questions answered today about this condtion.   Lab Results   Component Value Date    HGBA1C 6.6 (H) 12/24/2024          Exocrine pancreatic insufficiency    Orders:  •  pancrelipase, Lip-Prot-Amyl, (Creon) 24,000 units; Take 24,000 units of lipase by mouth 3 (three) times a day with meals    Acute respiratory failure with hypoxia (HCC)      Patient's shoes and socks removed.    Right Foot/Ankle   Right Foot Inspection  Skin Exam: skin normal. Skin not intact, no dry skin, no warmth, no callus, no erythema, no maceration, no abnormal color, no pre-ulcer, no ulcer and no callus.     Toe Exam: ROM and strength within normal limits. No tenderness    Sensory   Vibration: intact  Proprioception: intact  Monofilament testing: intact    Vascular  Capillary refills: < 3 seconds  The right DP pulse is 2+. The right PT pulse is 2+.     Left Foot/Ankle  Left Foot Inspection  Skin Exam: skin normal. Skin not intact, no dry skin, no warmth, no erythema, no maceration, normal color, no pre-ulcer, no ulcer and no callus.     Toe Exam: No swelling and no tenderness.     Sensory   Vibration: intact  Proprioception: intact  Monofilament testing: intact    Vascular  Capillary refills: < 3 seconds  The left DP pulse is 2+.  The left PT pulse is 2+.     Assign Risk Category  No deformity present  No loss of protective sensation  Weak pulses  Risk: 0       Falls Plan of Care: balance, strength, and gait training instructions were provided. Home safety education provided.     History of Present Illness   80-year-old male here today for checkup for multimedical problems patient with type 2 diabetes stage IV renal failure coronary artery disease peripheral vascular disease history of a pancreatic insufficiency here for follow-up.  The patient is doing well with his diabetes he needs refills on his Creon which she was going to mail to [facility] which I will provide him a prescription.  Patient actually is not doing too bad today.  Patient still does have some claudication with exertion when walking distances but at rest is doing fairly well area.  Discussed with patient to little bit today about need for dialysis and his concerns about what he is going to do when he is renal when his renal function declines further.  Patient will come back to see us in approximately 3 months for follow-up in regards to his multimedical problems he does see nephrology as well as cardiology and he currently is stable although he has significant multiple diseases.      Review of Systems   Constitutional:  Negative for activity change, appetite change, chills, fatigue, fever and unexpected weight change.   HENT:  Negative for congestion, ear pain, hearing loss, mouth sores, postnasal drip, sinus pressure, sinus pain, sneezing and sore throat.    Respiratory:  Negative for apnea, cough, shortness of breath and wheezing.    Cardiovascular:  Negative for chest pain, palpitations and leg swelling.   Gastrointestinal:  Negative for abdominal pain, constipation, diarrhea, nausea and vomiting.   Endocrine: Negative for cold intolerance and heat intolerance.   Genitourinary:  Negative for dysuria, frequency and hematuria.   Musculoskeletal:  Negative for arthralgias, back  "pain, gait problem, joint swelling and neck pain.   Skin:  Negative for rash.   Neurological:  Negative for dizziness, weakness and numbness.   Hematological:  Does not bruise/bleed easily.   Psychiatric/Behavioral:  Negative for agitation, behavioral problems, confusion, hallucinations and sleep disturbance. The patient is not nervous/anxious.        Objective   BP (!) 150/48 (BP Location: Left arm, Patient Position: Sitting, Cuff Size: Large)   Pulse (!) 49   Temp 97.9 °F (36.6 °C) (Temporal)   Ht 5' 10\" (1.778 m)   Wt 77.6 kg (171 lb)   SpO2 98%   BMI 24.54 kg/m²      Physical Exam  Constitutional:       Appearance: He is well-developed.   HENT:      Head: Normocephalic and atraumatic.      Right Ear: External ear normal.      Left Ear: External ear normal.      Nose: Nose normal.      Mouth/Throat:      Pharynx: Oropharynx is clear. No oropharyngeal exudate.   Eyes:      General: No scleral icterus.     Conjunctiva/sclera: Conjunctivae normal.      Pupils: Pupils are equal, round, and reactive to light.   Cardiovascular:      Rate and Rhythm: Normal rate and regular rhythm.      Pulses: Pulses are weak.           Dorsalis pedis pulses are 2+ on the right side and 2+ on the left side.        Posterior tibial pulses are 2+ on the right side and 2+ on the left side.      Heart sounds: Normal heart sounds.   Pulmonary:      Effort: Pulmonary effort is normal.      Breath sounds: Normal breath sounds. No wheezing or rales.   Abdominal:      General: Bowel sounds are normal.      Palpations: Abdomen is soft.      Tenderness: There is no abdominal tenderness. There is no guarding.   Musculoskeletal:         General: Normal range of motion.      Cervical back: Normal range of motion and neck supple.   Feet:      Right foot:      Skin integrity: No ulcer, skin breakdown, erythema, warmth, callus or dry skin.      Left foot:      Skin integrity: No ulcer, skin breakdown, erythema, warmth, callus or dry skin. "   Skin:     General: Skin is warm and dry.      Findings: No erythema or rash.   Neurological:      Mental Status: He is alert and oriented to person, place, and time. Mental status is at baseline.   Psychiatric:         Mood and Affect: Mood normal.         Behavior: Behavior normal.         Thought Content: Thought content normal.         Judgment: Judgment normal.

## 2025-03-31 NOTE — ASSESSMENT & PLAN NOTE
Wt Readings from Last 3 Encounters:   03/31/25 77.6 kg (171 lb)   02/20/25 74.8 kg (164 lb 12.8 oz)   02/19/25 74.4 kg (164 lb)   Patient is stable  and will continue present plan of care and reassess at next routine visit. All questions about this problem from patient were answered today.

## 2025-03-31 NOTE — ASSESSMENT & PLAN NOTE
Lab Results   Component Value Date    EGFR 20 03/13/2025    EGFR 20 02/08/2025    EGFR 19 02/01/2025    CREATININE 2.79 (H) 03/13/2025    CREATININE 2.78 (H) 02/08/2025    CREATININE 2.91 (H) 02/01/2025   Pt to avoid NSAIDs and any IV dyes. Patient to follow up eoither with nephrology or  with us for  further  monitoring of  renal function.

## 2025-04-04 ENCOUNTER — OFFICE VISIT (OUTPATIENT)
Dept: RADIATION ONCOLOGY | Facility: CLINIC | Age: 80
End: 2025-04-04
Attending: RADIOLOGY
Payer: MEDICARE

## 2025-04-04 VITALS
RESPIRATION RATE: 16 BRPM | BODY MASS INDEX: 24.68 KG/M2 | DIASTOLIC BLOOD PRESSURE: 64 MMHG | OXYGEN SATURATION: 97 % | WEIGHT: 172 LBS | HEART RATE: 67 BPM | TEMPERATURE: 97.1 F | SYSTOLIC BLOOD PRESSURE: 126 MMHG

## 2025-04-04 DIAGNOSIS — Z08 ENCOUNTER FOR FOLLOW-UP SURVEILLANCE OF LUNG CANCER: Primary | ICD-10-CM

## 2025-04-04 DIAGNOSIS — Z85.118 ENCOUNTER FOR FOLLOW-UP SURVEILLANCE OF LUNG CANCER: Primary | ICD-10-CM

## 2025-04-04 PROCEDURE — G2211 COMPLEX E/M VISIT ADD ON: HCPCS | Performed by: RADIOLOGY

## 2025-04-04 PROCEDURE — 99213 OFFICE O/P EST LOW 20 MIN: CPT | Performed by: RADIOLOGY

## 2025-04-04 PROCEDURE — 99211 OFF/OP EST MAY X REQ PHY/QHP: CPT | Performed by: RADIOLOGY

## 2025-04-04 NOTE — ASSESSMENT & PLAN NOTE
Jay Roach Jr. 1945 is a 80 y.o. 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  He recently completed definitive SBRT on 5/30/24.     We reviewed imaging results.  There is concern for possible recurrence versus radiation evolution of imaging.  He has no new respiratory symptoms.      We discussed repeat CT imaging in 3 months for monitoring findings.  He agreed.  Follow-up after imaging is completed.     He has suffered significant complications from comorbidities.  He notes that if he is offered dialysis, he is not sure that he will pursue versus hospice.  If comoribidities progress and he decides palliative care only then these tests and any further surveillance would be cancelled.      He expressed understanding.  He agrees to continue monitoring at this time.    Orders:    CT chest wo contrast; Future

## 2025-04-04 NOTE — PROGRESS NOTES
Follow-up Visit   Name: Jay Roach Jr.      : 1945      MRN: [id number]  Encounter Provider: Lilia Herrera MD  Encounter Date: 2025   Encounter department: [facility]  :  Assessment & Plan  Encounter for follow-up surveillance of lung cancer  Jay Roach Jr. 1945 is a 80 y.o. 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  He recently completed definitive SBRT on 24.     We reviewed imaging results.  There is concern for possible recurrence versus radiation evolution of imaging.  He has no new respiratory symptoms.      We discussed repeat CT imaging in 3 months for monitoring findings.  He agreed.  Follow-up after imaging is completed.     He has suffered significant complications from comorbidities.  He notes that if he is offered dialysis, he is not sure that he will pursue versus hospice.  If comoribidities progress and he decides palliative care only then these tests and any further surveillance would be cancelled.      He expressed understanding.  He agrees to continue monitoring at this time.    Orders:    CT chest wo contrast; Future        History of Present Illness   Pertinent Medical History   Jay Roach Jr. 1945 is a 80 y.o. 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  He recently completed definitive SBRT on 24. He was last seen 24 and returns today for follow up.       Interval medical history significant for emergent laparotomy for possible mesenteric ischemia, but significant lysis of adhesions done and no ischemia noted and no resection done.    25 Hospital admission- CHF and CKD     25 Dr. 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 This was  difficult to close and in fact elevated closed by secondary intention. The path report said that the lateral margin might be positive.   incision on his leg is healed nicely. The lateral margins are just fine. Inferiorly there is a slight bump on the medial side which I think is a suture that has not fully deteriorated as of yet. It is soft and does not look like a recurrence.   Follow up 6 months     3/25/25 CT chest wo contrast  1.  Interval enlargement of the irregular nodular opacity within the anterior left lower lobe. While this may represent evolving post radiation changes, continued short-term follow-up is recommended to exclude underlying progression.      Patient denies new respiratory or progressive symptoms.  He has occasional cough productive of clear sputum.  He denies shortness of breath at rest, progressive dyspnea on exertion, or fever.  He denies hemoptysis.  He denies chest pain or palpitations.    He notes that his renal function has declined significantly.  eGFR estimated about 10.  He suffers from claudication due to vascular disease legs bilaterally.     Oncology History   Cancer Staging   Primary non-small cell carcinoma of lower lobe of left lung (HCC)  Staging form: Lung, AJCC 8th Edition  - Clinical stage from 4/17/2024: Stage IA2 (cT1b, cN0, cM0) - Signed by Uzma Delgado PA-C on 4/25/2024  Histopathologic type: Squamous cell carcinoma, NOS  Oncology History   Primary non-small cell carcinoma of lower lobe of left lung (HCC)   3/21/2024 Initial Diagnosis    Primary non-small cell carcinoma of lower lobe of left lung (HCC)     4/17/2024 -  Cancer Staged    Staging form: Lung, AJCC 8th Edition  - Clinical stage from 4/17/2024: Stage IA2 (cT1b, cN0, cM0) - Signed by Uzma Delgado PA-C on 4/25/2024  Histopathologic type: Squamous cell carcinoma, NOS       4/17/2024 Biopsy    IR lung biopsy    A. Left lung nodule, biopsy: Non-small cell carcinoma consistent with a squamous cell  carcinoma.      5/20/2024 - 5/30/2024 Radiation    Treatments:  Course: C1_SBRT  Plan ID Energy Fractions Dose per Fraction (cGy) Dose Correction (cGy) Total Dose Delivered (cGy) Elapsed Days   SBRT LLL_50Gy 6X-FFF 5 / 5 1,000 0 5,000 10    Treatment Dates:  5/20/2024 - 5/30/2024.         Review of Systems Refer to nursing note.    Current Outpatient Medications on File Prior to Visit   Medication Sig Dispense Refill    allopurinol (ZYLOPRIM) 300 mg tablet Take 1 tablet (300 mg total) by mouth daily 90 tablet 1    amLODIPine (NORVASC) 10 mg tablet Take 1 tablet (10 mg total) by mouth daily 90 tablet 1    atorvastatin (LIPITOR) 80 mg tablet Take 1 tablet (80 mg total) by mouth daily at bedtime 90 tablet 1    calcitriol (ROCALTROL) 0.25 mcg capsule TAKE 1 CAPSULE DAILY 90 capsule 1    clopidogrel (PLAVIX) 75 mg tablet TAKE 1 TABLET DAILY 90 tablet 3    ferrous sulfate 325 (65 Fe) mg tablet Take 325 mg by mouth every other day      glimepiride (AMARYL) 1 mg tablet Take 1 tablet (1 mg total) by mouth daily with breakfast 90 tablet 1    hydrALAZINE (APRESOLINE) 25 mg tablet Take 3 tablets (75 mg total) by mouth 2 (two) times a day 540 tablet 3    magnesium Oxide (MAG-OX) 400 mg TABS Take 1 tablet (400 mg total) by mouth 2 (two) times a day 180 tablet 1    metoprolol tartrate (LOPRESSOR) 25 mg tablet Take 0.5 tablets (12.5 mg total) by mouth every 12 (twelve) hours 90 tablet 1    pancrelipase, Lip-Prot-Amyl, (Creon) 24,000 units Take 24,000 units of lipase by mouth 3 (three) times a day with meals 300 capsule 1    pantoprazole (PROTONIX) 40 mg tablet Take 1 tablet (40 mg total) by mouth 2 (two) times a day 180 tablet 1    potassium chloride (Klor-Con M20) 20 mEq tablet Take 1 tablet (20 mEq total) by mouth daily 90 tablet 1    torsemide (DEMADEX) 10 mg tablet Take 1 tablet (10 mg total) by mouth daily       No current facility-administered medications on file prior to visit.      Social History     Tobacco Use    Smoking  "status: Every Day     Current packs/day: 1.00     Average packs/day: 0.6 packs/day for 100.0 years (62.5 ttl pk-yrs)     Types: Cigarettes     Passive exposure: Current    Smokeless tobacco: Never    Tobacco comments:     1-2 cigarettes per day   Vaping Use    Vaping status: Never Used   Substance and Sexual Activity    Alcohol use: Not Currently     Comment: rare    Drug use: No    Sexual activity: Not Currently         Objective   /64   Pulse 67   Temp (!) 97.1 °F (36.2 °C)   Resp 16   Wt 78 kg (172 lb)   SpO2 97%   BMI 24.68 kg/m²     Pain Screening:  Pain Score:   4  ECOG  3  Physical Exam  Vitals and nursing note reviewed.   Constitutional:       General: He is not in acute distress.  Cardiovascular:      Rate and Rhythm: Normal rate.      Heart sounds: Murmur heard.   Pulmonary:      Breath sounds: No wheezing, rhonchi or rales.   Lymphadenopathy:      Cervical: No cervical adenopathy.   Neurological:      Mental Status: He is alert and oriented to person, place, and time.      Gait: Gait normal.            Administrative Statements   I have spent a total time of 25 minutes in caring for this patient on the day of the visit/encounter  Portions of the record may have been created with voice recognition software.  Occasional wrong word or \"sound a like\" substitutions may have occurred due to the inherent limitations of voice recognition software.  Read the chart carefully and recognize, using context, where substitutions have occurred.  "

## 2025-04-04 NOTE — PROGRESS NOTES
Jay Roach Jr. 1945 is a 80 y.o. 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  He recently completed definitive SBRT on 5/30/24. He was last seen 11/25/24 and returns today for follow up.       1/6/25 Hospital admission- CHF and CKD      2/20/25 Dr. 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 This was difficult to close and in fact elevated closed by secondary intention. The path report said that the lateral margin might be positive.   incision on his leg is healed nicely. The lateral margins are just fine. Inferiorly there is a slight bump on the medial side which I think is a suture that has not fully deteriorated as of yet. It is soft and does not look like a recurrence.   Follow up 6 months      3/25/25 CT chest wo contrast  1.  Interval enlargement of the irregular nodular opacity within the anterior left lower lobe. While this may represent evolving post radiation changes, continued short-term follow-up is recommended to exclude underlying progression.  Please refer to the report body for description of other incidental, chronic and/or benign findings.      Follow up visit     Oncology History   Primary non-small cell carcinoma of lower lobe of left lung (HCC)   3/21/2024 Initial Diagnosis    Primary non-small cell carcinoma of lower lobe of left lung (HCC)     4/17/2024 -  Cancer Staged    Staging form: Lung, AJCC 8th Edition  - Clinical stage from 4/17/2024: Stage IA2 (cT1b, cN0, cM0) - Signed by Uzma Delgado PA-C on 4/25/2024  Histopathologic type: Squamous cell carcinoma, NOS       4/17/2024 Biopsy    IR lung biopsy    A. Left lung nodule, biopsy: Non-small cell carcinoma consistent with a squamous cell carcinoma.      5/20/2024 - 5/30/2024 Radiation    Treatments:  Course: C1_SBRT  Plan ID Energy Fractions Dose per Fraction (cGy) Dose Correction  (cGy) Total Dose Delivered (cGy) Elapsed Days   SBRT LLL_50Gy 6X-FFF 5 / 5 1,000 0 5,000 10    Treatment Dates:  5/20/2024 - 5/30/2024.          Review of Systems:  Review of Systems   Respiratory:  Positive for cough. Negative for shortness of breath and wheezing.         Smokes 1-2 cigarettes per day       Clinical Trial: no      Health Maintenance   Topic Date Due    Zoster Vaccine (1 of 2) Never done    COVID-19 Vaccine (7 - Pfizer risk 2024-25 season) 04/01/2025    HEMOGLOBIN A1C  06/24/2025    Fall Risk  09/04/2025    Medicare Annual Wellness Visit (AWV)  09/04/2025    Depression Screening  11/25/2025    Diabetic Eye Exam  02/01/2026    BMI: Adult  03/31/2026    Diabetic Foot Exam  03/31/2026    Hepatitis C Screening  Completed    RSV Vaccine for Pregnant Patients and Patients Age 60+ Years  Completed    Pneumococcal Vaccine: 65+ Years  Completed    Influenza Vaccine  Completed    Meningococcal B Vaccine  Aged Out    RSV Vaccine age 0-20 Months  Aged Out    HIB Vaccine  Aged Out    IPV Vaccine  Aged Out    Hepatitis A Vaccine  Aged Out    Meningococcal ACWY Vaccine  Aged Out    HPV Vaccine  Aged Out    Lung Cancer Screening  Discontinued    Colorectal Cancer Screening  Discontinued     Patient Active Problem List   Diagnosis    Atherosclerosis of native arteries of extremities with intermittent claudication, bilateral legs (HCC)    Stenosis of noncoronary bypass graft (HCC)    Asymptomatic bilateral carotid artery stenosis    S/P CABG (coronary artery bypass graft)    Primary hypertension    Renal artery stenosis, native, bilateral (HCC)    Coronary artery disease involving native coronary artery of native heart without angina pectoris    Progressive angina (HCC)    Tension headache    Cigarette nicotine dependence with nicotine-induced disorder    Calcific tendinitis of right shoulder region    Right shoulder pain    Sinus bradycardia    Type 2 diabetes mellitus with stage 4 chronic kidney disease, without  long-term current use of insulin (HCC)    GERD (gastroesophageal reflux disease)    Hyperlipidemia    Persistent proteinuria, unspecified    CKD (chronic kidney disease) stage 4, GFR 15-29 ml/min (HCC)    Leukopenia    Hypomagnesemia    Renal cyst, left    Acute kidney injury superimposed on chronic kidney disease  (HCC)    Iron deficiency anemia    Chronic diastolic CHF (congestive heart failure) (HCC)    Elevated serum creatinine    S/P TAVR (transcatheter aortic valve replacement)    Benign hypertension with chronic kidney disease, stage III (HCC)    Secondary hyperparathyroidism of renal origin (HCC)    Anemia due to stage 4 chronic kidney disease  (HCC)    Hyperuricemia    Platelets decreased (HCC)    Vitamin D deficiency    GI bleed    Acute blood loss anemia    Acute kidney injury superimposed on stage 4 chronic kidney disease (HCC)    Melena    Chronic pancreatitis (HCC)    Peptic ulcer    History of GI bleed    Primary non-small cell carcinoma of lower lobe of left lung (HCC)    Post-procedural respiratory complication    Hypertensive kidney disease with stage 4 chronic kidney disease (HCC)    Nephrolithiasis    Controlled type 2 diabetes mellitus with stage 4 chronic kidney disease, without long-term current use of insulin (HCC)    Pneumatosis intestinalis    Pancreatic insufficiency    Mesenteric ischemia (HCC)    SHOAIB (acute kidney injury) (HCC)    Short-term memory loss    Encephalopathy    Insomnia    Frailty syndrome in geriatric patient    Ambulatory dysfunction    Ischemic cardiomyopathy    Volume overload    Mesenteric artery stenosis/recent mesenteric ischemia    Acute on chronic heart failure with preserved ejection fraction (HFpEF) (HCC)    Atherosclerosis of native arteries of extremities with intermittent claudication, right leg (HCC)    NSVT (nonsustained ventricular tachycardia) (HCC)     Past Medical History:   Diagnosis Date    Atherosclerosis of autologous vein bypass graft(s) of other  extremity with ulceration ([facility]) 09/10/2021    Atherosclerosis of native arteries of extremities with intermittent claudication, right leg ([facility]) 01/06/2025    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 ([facility]) 02/24/2023    Basal cell carcinoma     right cheek    CAD (coronary artery disease)     Carotid stenosis, asymptomatic, bilateral     Chronic kidney disease     Colon polyp     Dependence on respirator (ventilator) status ([facility]) 04/07/2023    Diabetes ([facility])     type 2, non-insulin dependent    Diabetes mellitus ([facility])     GERD (gastroesophageal reflux disease)     History of nephrolithiasis     Hyperlipidemia     Hypertension     Left foot pain 11/30/2022    Lung cancer ([facility])     PAD (peripheral artery disease) ([facility])     Portal Venous Gas 12/24/2024    Severe aortic stenosis     Squamous cell skin cancer 11/22/2022    left superior helix     Past Surgical History:   Procedure Laterality Date    AORTA - SUPERIOR MESENTERIC ARTERY BYPASS GRAFT N/A 12/24/2024    Procedure: OPEN MESENTERIC STENTING;  Surgeon: Eagle Higuera MD;  [facility];  Service: Vascular    APPENDECTOMY      ARTERIOGRAM N/A 12/24/2024    Procedure: ARTERIOGRAM;  Surgeon: Eagle Higuera MD;  [facility];  Service: Vascular    CARDIAC CATHETERIZATION N/A 05/05/2022    Procedure: CARDIAC RHC/LHC;  Surgeon: Arvin Sanchez DO;  [facility];  Service: Cardiology    CARDIAC CATHETERIZATION N/A 05/05/2022    Procedure: Cardiac Coronary Angiogram;  Surgeon: Arvin Sanchez DO;  [facility];  Service: Cardiology    CARDIAC CATHETERIZATION N/A 05/24/2022    Procedure: Cardiac pci;  Surgeon: Damien Jeter MD;  [facility];  Service: Cardiology    CARDIAC CATHETERIZATION N/A 06/14/2022    Procedure: CARDIAC TAVR;  Surgeon: Nahum Vaz MD;  [facility];  Service: Cardiology    COLECTOMY      COLONOSCOPY  2013     CORONARY ARTERY BYPASS GRAFT  2013    X 2    FEMORAL ARTERY - POPLITEAL ARTERY BYPASS GRAFT      HEMORRHOID SURGERY      IR AORTAGRAM WITH RUN-OFF  11/19/2018    IR AORTAGRAM WITH RUN-OFF  03/02/2023    IR BIOPSY LUNG  4/17/2024    IR LOWER EXTREMITY ANGIOGRAM  03/23/2023    IR MESENTERIC/VISCERAL ANGIOGRAM  12/24/2024    MOHS SURGERY Left 01/11/2023    SCC left superior helix-Dr Guy    SC LAPS ABD PRTM&OMENTUM DX W/WO SPEC BR/WA SPX N/A 12/24/2024    Procedure: EXPLORATORY LAPAROTOMY, LYSIS OF ADHESIONS, ABDOMEN CLOSURE;  Surgeon: Alvin Flores MD;  [facility];  Service: General    SC SLCTV CATHJ 3RD+ ORD SLCTV ABDL PEL/LXTR BRNCH Left 08/12/2016    Procedure: LEFT FEMORAL ARTERIOGRAM; BALLOON ANGIOPLASTY; SFA  AND FEMORAL AT VEIN GRAFT;  Surgeon: Nicholas Urena MD;  [facility];  Service: Vascular    SC TEAEC W/WO PATCH GRAFT COMMON FEMORAL Right 03/23/2023    Procedure: Common femoral endarterectomy&antegrade intervention of SFA, popliteal artery w/ shockwave;  Surgeon: Eagle Higuera MD;  Location[facility];  Service: Vascular    SC TRANSCATHETER TRANSAPICAL REPLACEMT AORTIC VALVE N/A 06/14/2022    Procedure: REPLACEMENT AORTIC VALVE TRANSCATHETER (TAVR) TRANSAPICAL 29MM IRVIN KYLER S3 ULTRA VALVE(ACCESS ON LEFT) ELANA;  Surgeon: Amarjit Gordon DO;  [facility];  Service: Cardiac Surgery    SKIN CANCER EXCISION      TONSILLECTOMY AND ADENOIDECTOMY      UPPER GASTROINTESTINAL ENDOSCOPY       Family History   Problem Relation Age of Onset    Heart attack Father     Other Sister         bypass and vlave replacement    Stroke Paternal Uncle     Arrhythmia Neg Hx     Asthma Neg Hx     Clotting disorder Neg Hx     Fainting Neg Hx     Anuerysm Neg Hx     Hypertension Neg Hx         unsure     Hyperlipidemia Neg Hx     Heart failure Neg Hx      Social History     Socioeconomic History    Marital status:      Spouse name: Not on file    Number of children: Not on  file    Years of education: Not on file    Highest education level: Not on file   Occupational History    Not on file   Tobacco Use    Smoking status: Every Day     Current packs/day: 1.00     Average packs/day: 0.6 packs/day for 100.0 years (62.5 ttl pk-yrs)     Types: Cigarettes     Passive exposure: Current    Smokeless tobacco: Never    Tobacco comments:     4 cigarettes per day   Vaping Use    Vaping status: Never Used   Substance and Sexual Activity    Alcohol use: Not Currently     Comment: rare    Drug use: No    Sexual activity: Not Currently   Other Topics Concern    Not on file   Social History Narrative    · Most recent tobacco use screenin2018      · Do you currently or have you served in the [email or web address]:   Yes      · If Yes, What branch of service:   [employer]      · Occupation:   Dentists      · Exercise level:   Occasional        · Caffeine intake:   Heavy      · Marital status:         · Diet:   Regular  low fat     · Seat belts used routinely:   Yes      · Smoke alarm in home:   Yes      · Advance directive:   Yes      · General stress level:   Low      Social Drivers of Health     Financial Resource Strain: Low Risk  (2023)    Overall Financial Resource Strain (CARDIA)     Difficulty of Paying Living Expenses: Not very hard   Food Insecurity: No Food Insecurity (2025)    Nursing - Inadequate Food Risk Classification     Worried About Running Out of Food in the Last Year: Never true     Ran Out of Food in the Last Year: Never true     Ran Out of Food in the Last Year: Never true   Transportation Needs: No Transportation Needs (2/3/2025)    OASIS : Transportation     Lack of Transportation (Medical): No     Lack of Transportation (Non-Medical): No     Patient Unable or Declines to Respond: No   Physical Activity: Not on file   Stress: Not on file   Social Connections: Not on file   Intimate Partner Violence: Unknown (2025)    Nursing IPS     Feels Physically and  Emotionally Safe: Not on file     Physically Hurt by Someone: Not on file     Humiliated or Emotionally Abused by Someone: Not on file     Physically Hurt by Someone: No     Hurt or Threatened by Someone: No   Housing Stability: Unknown (2025)    Nursing: Inadequate Housing Risk Classification     Has Housing: Not on file     Worried About Losing Housing: Not on file     Unable to Get Utilities: Not on file     Unable to Pay for Housing in the Last Year: No     Has Housin       Current Outpatient Medications:     allopurinol (ZYLOPRIM) 300 mg tablet, Take 1 tablet (300 mg total) by mouth daily, Disp: 90 tablet, Rfl: 1    amLODIPine (NORVASC) 10 mg tablet, Take 1 tablet (10 mg total) by mouth daily, Disp: 90 tablet, Rfl: 1    atorvastatin (LIPITOR) 80 mg tablet, Take 1 tablet (80 mg total) by mouth daily at bedtime, Disp: 90 tablet, Rfl: 1    calcitriol (ROCALTROL) 0.25 mcg capsule, TAKE 1 CAPSULE DAILY, Disp: 90 capsule, Rfl: 1    clopidogrel (PLAVIX) 75 mg tablet, TAKE 1 TABLET DAILY, Disp: 90 tablet, Rfl: 3    ferrous sulfate 325 (65 Fe) mg tablet, Take 325 mg by mouth every other day, Disp: , Rfl:     glimepiride (AMARYL) 1 mg tablet, Take 1 tablet (1 mg total) by mouth daily with breakfast, Disp: 90 tablet, Rfl: 1    hydrALAZINE (APRESOLINE) 25 mg tablet, Take 3 tablets (75 mg total) by mouth 2 (two) times a day, Disp: 540 tablet, Rfl: 3    magnesium Oxide (MAG-OX) 400 mg TABS, Take 1 tablet (400 mg total) by mouth 2 (two) times a day, Disp: 180 tablet, Rfl: 1    metoprolol tartrate (LOPRESSOR) 25 mg tablet, Take 0.5 tablets (12.5 mg total) by mouth every 12 (twelve) hours, Disp: 90 tablet, Rfl: 1    pancrelipase, Lip-Prot-Amyl, (Creon) 24,000 units, Take 24,000 units of lipase by mouth 3 (three) times a day with meals, Disp: 300 capsule, Rfl: 1    pantoprazole (PROTONIX) 40 mg tablet, Take 1 tablet (40 mg total) by mouth 2 (two) times a day, Disp: 180 tablet, Rfl: 1    potassium chloride (Klor-Con M20)  20 mEq tablet, Take 1 tablet (20 mEq total) by mouth daily, Disp: 90 tablet, Rfl: 1    torsemide (DEMADEX) 10 mg tablet, Take 1 tablet (10 mg total) by mouth daily, Disp: , Rfl:   No Known Allergies  There were no vitals filed for this visit.

## 2025-04-10 NOTE — ASSESSMENT & PLAN NOTE
Patient to continue with present therapy and decrease caffeine, avoid ETOH and smoking to decrease acid production  Pt should also cease eating prior to bedtime and avoid excessive fluid intake prior to sleep  May use antacids as needed for breakthrough GERD  All pateint questions answered today about this condition  Yes

## 2025-04-26 NOTE — PROGRESS NOTES
Cardiology  Follow Up   Office Visit Note     Jay Roach Jr.   80 y.o.   male   MRN: [id number]  [facility] [place]  [address] [facility] [address]  [address]  [facility] [address] [phone]    PCP: Simón Hadley MD  Cardiologist : Dr Sanchez  Neph: Dr Muñoz              Summary of Recommendations  Low-sodium diet, Heart failure education as below  Encouraged hydration 64 oz a day.  I suspect he is consuming much less  Lipid profile with next labs.  Also requested a HgA1c ( DM2)  CBC (chronic anemia).  BMP for renal  Lifelong SBE prophylaxis  Follow-up with Dr. Sanchez 7/15/25  Colon Ca screenin2023 , up-to-date      Impression/plan  Aortic Stenosis, severe, S/P # 29 mm TAVR.    Lifelong SBE prophylaxis  On a statin  CAD   S/P  CABG x2 2013  [facility] 22: BURAK x 1 to pRCA, 10% residual stenosis, 50% mid RCA lesion medically managed; Patent LIMA to LAD, and patent SVG to OM(pre TAVR workup)  SPECT : no ischemia  On aspirin, Plavix ( PVD), statin, BB  Chronic diastolic heart failure. He has lost wt  Wt Readings from Last 3 Encounters:   25 76 kg (167 lb 9.6 oz)   25 78 kg (172 lb)   25 77.6 kg (171 lb)   '  --beta-blocker:  on metoprolol succinate 12.5 mg b.i.d.   --Diuretic:   torsemide 10 mg daily plus Klor-Con 20 mEq daily + mag.    --2 g sodium diet, 1800 cc fluid restriction. Daily weights  Hypertension, essential.   /48   on amlodipine 10 mg daily, hydralazine 75 mg twice daily, metoprolol tartrate 12.5 mg every 12, torsemide 10 mg daily  Hyperlipidemia.   On atorvastatin 80 mg/d.   2022:LDL 33, non HDL 43  Due for reassessment of his lipids.  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    Baseline creatinine 1. 8- 2.2, recently rising, now 2.79. Encouraged hydration.  Has a BMP next week for renal. He appears dry  follows with Nephrology Dr. Argawal  PVD.  Followed by vascular surgery-last seen 2025  Bilateral carotid stenosis,  asymptomatic  S/P left fem to anterior tibial bypass by Dr Urena 2015, s/p  Angioplasty to bypass graft stenoses 2016, recurrent bypass graft stenosis  Right femoral endarterectomy and extensive shock wave of right SFA in March 2023  Mesenteric ischemia status post retrograde open mesenteric stent with  general surgery and vascular surgery team  12/24  PVCs, noted on telemetry January 2025, up to 18 beats, in the setting of hypokalemia, hypomagnesemia  Recurrent GIB , lastly in Jan 2024  Non-small cell -squamous cell, status post radiation therapy,  followed by radiation oncology.  Diagnosed 1/2024. CT chest pending  Iron deficiency anemia.   History ischemic colitis.    Tobacco abuse --quit December 2024  Cardiac testing  TTE 5/6/21 EF 60%.  No are WMA.  Grade 1 DD.  Mild left atrial enlargement.  Mild MR.  Severe aortic stenosis.  Peak gradient 63 mean gradient 32.  LUIS 1 cm2 by the continuity equation.  TTE 3/24/22 EF 50%.  Grade 2 DD.  Mild LVH.  Moderate hypokinesis of the basal to mid inferior wall.  RV size and function normal.  Mild left atrial enlargement.  Mild AI, Severe aortic stenosis.  Moderate MR  Holter monitor 4/5/22 occasional bradycardia with maximum of 2.7 second pauses  [facility] 5/5/22.Patent LIMA-LAD; Patent SVG-OM; Ungrafted proximal RCA with calcific lesion requiring PCI pre TAVR consideration        Due to CKD with creatinine 2.3-2.8 range (GFR <25) will need staged complex PCI with pre admission hydration night before.  [facility] 5/24/22 PCI/BURAK proximal RCA lesion.  Residual 10% stenosis.  50% mid RCA lesion medically managed  TTE 6/15/22.  EF 60%.  Grade 1 DD.  Mild LAE.There is an Smith KYLER 3 29 mm TAVR bioprosthetic valve. The prosthetic valve appears well-seated and appears to be functioning normally. Prosthetic valve leaflet motion is normal. There is no evidence of paravalvular regurgitation. There is no evidence of transvalvular regurgitation. The gradient recorded across the prosthetic  aortic valve is within the expected range. The aortic valve peak velocity is 1.84 m/s. The aortic valve mean gradient is 8 mmHg. The DVI is 0.49. The aortic valve area is 2.04 cm2. Mitral Valve: There is mild annular calcification.  No evidence of MR  SPECT 8/22: no ischemia  TTE 6/8/23. EF 65%.  Wall motion normal.  Grade 1 DD.There is an Smith KYLER 3 Ultra 29 mm TAVR bioprosthetic valve. The prosthetic valve appears well-seated and appears to be functioning normally.  Trace PVL.  No TVR. The gradient recorded across the prosthetic aortic valve is within the expected range. The aortic valve peak velocity is 1.81 m/s. The aortic valve peak gradient is 13 mmHg. The aortic valve mean gradient is 8 mmHg.  Aortic valve area is 1.76 cm².  TTE 1/8/2025.  EF 65%.  Mild LVH.  Grade 1 DD.  RV normal size and function.  Moderate LAE.There is an Smith KYLER 3 Ultra 29 mm TAVR bioprosthetic valve.  Trace TR.  No PVR. There is no evidence of paravalvular regurgitation. The gradient recorded across the prosthetic aortic valve is within the expected range. The aortic valve mean gradient is 7 mmHg. The dimensionless velocity index is 0.51. The aortic valve area is 2.21 cm2.  Mild mitral valve thickening with moderate calcification moderate chordal involvement.  Mild MR               HPI  Jay Roach Jr. is a 77 y.o. year old male with CAD, AS, PVD, HTN, HL, CKD, ongoing tobacco abuse and chronic anemia.  He underwent CABG x2 September 2013.  His ejection fraction at that time was preserved.  He has had left lower extremity revascularization and carotid disease, followed by vascular surgery.  He has also had sinus bradycardia which has improved, with the discontinuation of beta-blockers.  Due to progressive severe symptomatic AS he was referred to CT surgery for evaluation for TAVR.  He was seen in the office September 2020 by Dr. Urbina.  At that time the patient decided to pursue evaluation of neurological  complaints prior to consideration for TAVR.  He has followed with Dr. Sanchez.  He was last seen in the office 3/2/22.  Encouraged follow-up with CT surgery  Home meds include amlodipine 10 mg daily, benazepril 40 mg daily, and hydralazine 50 mg t.i.d..  Patient not on diuretics at home    Adm 3/22-3/25/22  CC:  Shortness of breath, DEMARCO times 1-2 weeks, lower extremity edema, orthopnea  HsTn : 674 --> 588 --> 727  ECG - NSR, LVh with repolarization abnormalities  CXR -vascular congestion and small right effusion, NT proBNP 34 000.Creatinine was 2.67, from baseline of 1.7-1.8  Diuresed with Lasix 80 IV b.i.d.  Echo: EF 50, grade 2 DD.  Severe AS  Discharge creatinine :  2.5   Discharge weight 167 lb   Discharge diuretic:  Torsemide 60 mg daily  Hydralazine and benazepril were discontinued      3/28/22   labs: Cr 2.85, BUN 58  K 4.3  Hemoglobin 8.1 hematocrit 27.4.      H&H March 25th were 7.1/ 22.9      3/31/22  Hospital follow-up: acute diastolic heart failure secondary to severe aortic stenosis, CKD 4.  EF 50. CTS eval 4/22  ROS:  Overall he tells me his breathing has improved.  No chest pain.  He is able to lie flat, no orthopnea.DEMARCO with steps.  No CP.  No lightheadedness or dizziness.  He is quite aware of his labs, and concerned about his renal function   His weight is down  Wt Readings from Last 3 Encounters:   04/04/25 78 kg (172 lb)   03/31/25 77.6 kg (171 lb)   02/20/25 74.8 kg (164 lb 12.8 oz)   Blood pressure 110/58.  Heart rate today 59 and regular by exam  Social history:  I suspect some sodium dietary indiscretion in the past.  We discussed the importance of a salt restricted diet today  We reviewed his most recent labs, concern is his renal function, and anemia, bradycardia while hospitalized  He is not taking the iron supplement because it is constipating .  No melena or hematochezia  Today will decrease his torsemide to 20 mg b.i.d., reassess his labs in 1 week   In review his meds he is also still  on metformin given his CKD, recommend he stop it and follow-up with his PCP as well as his nephrologist      Interval history  [facility] 80% RCA lesion.      5/11/22 OV Dr Sanchez  + DEMARCO  Holter monitor demonstrated occasional bradycardia with maximum of 2.7 second pauses  Bradycardia, improved off beta-blockers  Thinking about TAVR  Issues with ischemic colitis, starting iron  Follow-up 3 months    Adm 5/23-5/25/22  [facility]: PCI / BURAK x 1 to pRCA, 10% residual stenosis; 50% mid RCA-medically managed; Patent LIMA to LAD, and patent SVG to OM  DAPT with aspirin and Plavix  Followed by Nephrology  Discharge creatinine 2.0    Adm 6/14-6/17/22.   Elective TAVR  Postprocedure echo shows an EF of 60%, a well-seated aortic valve, no MR  Discharged on metoprolol succinate 12.5 mg b.i.d. for bigeminy with PVCs  Started on lisinopril 10 mg daily  Discharge creatinine 2.45  Discharge weight: 167 lb      6/28.  Office visit Nephrology  Current meds: Amlodipine 10 mg daily, lisinopril 10 mg, Metoprolol 12.5 mg bid, Torsemide 20 mg bid   BP high in the office but acceptable at home no med changes  Recommend continuing lisinopril  Declined iron, given constipation    7/7/22  He is a 77 y.o. year old male with CAD, AS, PVD, HTN, HL, CKD, ongoing tobacco abuse and chronic anemia.  He underwent CABG x2 September 2013.  Found  To have worsening AS, referred to CT surgery  [facility] 5/24/22: BURAK x 1 to pRCA, 10% residual stenosis, 50% mid RCA lesion medically managed; Patent LIMA to LAD, and patent SVG to OM (pre TAVR workup)  TAVR 6/14/22.  EF preserved  ROS: occ dizzy. He backed off on some of his meds- still symptomatic.  C/O leg heaviness with walking.  Denies chest pain, SOB.  Mostly concerned about his dizziness  Currently on On Toprol 12.5 daily, torsemide 20/d-  he's been taking it daily the last few days given nocturia. K 20 meq/d. Mag ox 200 weekly  Last labs 6/20; cr 2.26  BUN 53  K 4.3.  H/H  9.3/30  Wt stable 166 lb  BP up 162/45 by the  automated machine by me. Left arm  EKG sinus bradycardia first-degree AV block 55 beats per minute deep T-wave inversions most pronounced anteriorly laterally.  Nonspecific changes inferiorly  I reviewed his case in EKG briefly with Dr. Pastrana was in the office today  Recommended stress test and echocardiogram.  The echo has previously been scheduled.  Will pursue a nuclear stress test.  The patient is in agreement  For now, will continue Toprol 12.5 mg daily, encouraged torsemide 20 mg b.i.d. This may help his blood pressure is well    Interval history  Adm 3/2/45043: Elective vascular surgery  elective right common femoral endarterectomy, bovine patch angioplasty with antegrade SFA/pop PTA/shockwave angioplasty.      was transfused 2 units in the operating room for acute blood loss anemia.    He received 1 more unit of PRBC over the weekend after surgery.      Adm 12/4-12/10/2023: GIB  He presented with melena and BRBPR  Aspirin Plavix were held  Underwent EGD and C-scope  EGD showed a single ulcer in the fundus of the stomach, ablated with heater probe.  Clips placed successfully  C-scope unrevealing for acute problem  cleared by GI for DC on 12/10/23 after restarting asa and plavix the day before. He had a normal BM this am light brown and formed.        Adm 1/11-1/13/25 GIB  found to be anemic and required 1 unit PRBC.    Patient also underwent an EGD requiring 5 clips.     Adm 4/17-4/19/24 Lung mass/hemoptysis after procedure  CC: Shortness of breath, hemoptysis  As an outpatient he was undergoing an IR guided lung biopsy however the patient moved during the procedure, he had a complication of hypoxia due to pulmonary hemorrhage, hemoptysis.  He received nebulized TXA  Hospitalized for observation  The patient reported he stopped Plavix 5 days prior to the procedure    11/15/2024 OV Dr. Sanchez  Weight was 174 pounds   on torsemide 10 mg daily    Adm 12/24 - 1/3/2025 SLRA-->SLB Acute mesenteric ischemia  CTA  abdomen/pelvis which was significant for fat stranding and portal venous gas highly suspicious for mesenteric ischemia  Patient transferred to [facility] for further intervention with general surgery and vascular surgery.     He underwent an exploratory laparotomy with retrograde, open mesenteric SMA 12/24/2024    Followed by nephrology and geriatrics for management of his CKD and delirium, respectively.    Other discharge cardiac medications: Norvasc 10, Plavix 75, Lasix 43 times a week, hydralazine 50 twice daily, Metroprolol tartrate 12.5 every 12    Discharge weight: 182 pound  Discharge creatinine 2.0.  Peak creatinine was 2.7  Discharge diuretic Lasix 40 mg 3 times a week Tuesday, Thursday, Saturday, chronically he was on torsemide 10 mg daily.  Due to SHOAIB his diuretics were decreased by nephrology    Adm 1/6-1/11/25 ADHF  CC:increasing lower extremity swelling, abdominal distention as well as increasing shortness of breath. No reports of chest pain or pressure.    Chest x-ray small bilateral pleural effusions  Troponins negative  He was initially placed on Lasix 40 mg twice daily IV  Nephrology subsequently started him on torsemide with good diuretic response, however his creatinine continued to rise   H&H stable. 7.8/24.2       1/15/25 OV Dr Sanchez   Per his note:  HPI: Jay Hathawayusman Rossi. is a 79 y.o. year old male with coronary artery disease s/p CABG with PCI of RCA 5/22, AS s.o TAVR 6/22, HTN, Dyslipidemia and LLE revascularization who returns for follow up. No chest pain, shortness of breath, or palpitations. Stress test 8/22 - no ischemia.  Echo 7/20 - normal cardiac function with normal TAVR. Does have headache.  Underwent RLE revascularization 3/23. Echo 6/23 - EF 65%, normal TAVR. Had GI bleeding in 12/23 and 1/24. Patient admitted to hospital 12/24/24 with mesenteric ischemia, and underwent ex-lap with retrograde open mesenteric stent.  Discharged on 1/3/24, but readmitted 1/6 24 with  "CHF exacerbation with SHOAIB - started on IV diuretics.  Echo 1/8/25 demonstrated normal LV systolic function, normal TAVR, mild MR.Cr 1/11/25 - 2.4. Major complaint is fatigue and dyspnea.    Impression:  1. Coronary artery disease s/p CABG - s/p recent PCI of RCA  2. Hypertension - borderline control.  3. Severe bilateral carotid disease - followed by vascular surgery.  4. Dyslipidemia - doing well.  5. Peripheral arterial disease - s/p LLE revascularization and RLE revascularization 3/23.  6. Aortic stenosis - s/p TAVR 6/22  7. Bradycardia - on low-dose b-blockers.   8. CKD - CKD IV Cr 2.4  9.  Mesenteric ischemia - s/p stent 12/24.     Recommendations:  1. Continue current medications.   2. Increase fluid to 2000 ml/day.  3. Follow up in 4 months.        4/29/25  Cardiology follow-up  PMH:CAD, S/P CABG ;AS, S/P TAVR, chronic HFpEF,HTN, HL, CKD, diabetes, tobacco abuse-recently quit, lung CA status post radiation therapy,  chronic anemia, mesenteric ischemia status post open mesenteric stent 12/2024.  PVD with left lower extremity CFA stent, asymptomatic carotid artery stenosis    On Plavix after his most recent stent    Of note that his creatinine continues to rise 2.79  OFF Jardiance.  OFF lisinopril   ROS: No CP.  No SOB.  No0 palpitation.  + weak. No PND/ orthopnea.  No HS cough.  No LE edema. Frequent urination. \" I think Im on the way out\"    /48  Weight 167 now.  Was 171 lb in March.  Is eating 2 meals a day, trying to not overdo the protein.  Chronic pain in his legs.  Can't walk far before he has to stop. Rest helps the pain,         Assessment  Chronic HFpEF  Status post CABG  Status post TAVR  Hypertension  CKD 4  History of mesenteric ischemia  NSVT  CAD  Chronic anemia  PVD    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) 09/10/2021    Atherosclerosis of native arteries of extremities with intermittent " claudication, right leg ([facility]) 01/06/2025    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 ([facility]) 02/24/2023    Basal cell carcinoma     right cheek    CAD (coronary artery disease)     Carotid stenosis, asymptomatic, bilateral     Chronic kidney disease     Colon polyp     Dependence on respirator (ventilator) status ([facility]) 04/07/2023    Diabetes ([facility])     type 2, non-insulin dependent    Diabetes mellitus ([facility])     GERD (gastroesophageal reflux disease)     History of nephrolithiasis     Hyperlipidemia     Hypertension     Left foot pain 11/30/2022    Lung cancer ([facility])     PAD (peripheral artery disease) ([facility])     Portal Venous Gas 12/24/2024    Severe aortic stenosis     Squamous cell skin cancer 11/22/2022    left superior helix       Review of Systems   Constitutional: Positive for malaise/fatigue and weight loss. Negative for chills.   Cardiovascular:  Negative for chest pain, claudication, cyanosis, dyspnea on exertion, irregular heartbeat, leg swelling, near-syncope, orthopnea, palpitations, paroxysmal nocturnal dyspnea and syncope.        + RLE claudication   Respiratory:  Negative for cough and shortness of breath.    Gastrointestinal:  Negative for heartburn and nausea.   Neurological:  Negative for dizziness, focal weakness, headaches, light-headedness and weakness.   All other systems reviewed and are negative.      No Known Allergies  .    Current Outpatient Medications:     allopurinol (ZYLOPRIM) 300 mg tablet, Take 1 tablet (300 mg total) by mouth daily, Disp: 90 tablet, Rfl: 1    amLODIPine (NORVASC) 10 mg tablet, Take 1 tablet (10 mg total) by mouth daily, Disp: 90 tablet, Rfl: 1    atorvastatin (LIPITOR) 80 mg tablet, Take 1 tablet (80 mg total) by mouth daily at bedtime, Disp: 90 tablet, Rfl: 1    calcitriol (ROCALTROL) 0.25 mcg capsule, TAKE 1 CAPSULE DAILY, Disp: 90 capsule, Rfl: 1    clopidogrel (PLAVIX) 75 mg tablet, TAKE 1 TABLET DAILY, Disp: 90 tablet,  Rfl: 3    ferrous sulfate 325 (65 Fe) mg tablet, Take 325 mg by mouth every other day, Disp: , Rfl:     glimepiride (AMARYL) 1 mg tablet, Take 1 tablet (1 mg total) by mouth daily with breakfast, Disp: 90 tablet, Rfl: 1    hydrALAZINE (APRESOLINE) 25 mg tablet, Take 3 tablets (75 mg total) by mouth 2 (two) times a day, Disp: 540 tablet, Rfl: 3    magnesium Oxide (MAG-OX) 400 mg TABS, Take 1 tablet (400 mg total) by mouth 2 (two) times a day, Disp: 180 tablet, Rfl: 1    metoprolol tartrate (LOPRESSOR) 25 mg tablet, Take 0.5 tablets (12.5 mg total) by mouth every 12 (twelve) hours, Disp: 90 tablet, Rfl: 1    pancrelipase, Lip-Prot-Amyl, (Creon) 24,000 units, Take 24,000 units of lipase by mouth 3 (three) times a day with meals, Disp: 300 capsule, Rfl: 1    pantoprazole (PROTONIX) 40 mg tablet, Take 1 tablet (40 mg total) by mouth 2 (two) times a day, Disp: 180 tablet, Rfl: 1    potassium chloride (Klor-Con M20) 20 mEq tablet, Take 1 tablet (20 mEq total) by mouth daily, Disp: 90 tablet, Rfl: 1    torsemide (DEMADEX) 10 mg tablet, Take 1 tablet (10 mg total) by mouth daily, Disp: , Rfl:     Social History     Socioeconomic History    Marital status:      Spouse name: Not on file    Number of children: Not on file    Years of education: Not on file    Highest education level: Not on file   Occupational History    Not on file   Tobacco Use    Smoking status: Every Day     Current packs/day: 1.00     Average packs/day: 0.6 packs/day for 100.0 years (62.5 ttl pk-yrs)     Types: Cigarettes     Passive exposure: Current    Smokeless tobacco: Never    Tobacco comments:     1-2 cigarettes per day   Vaping Use    Vaping status: Never Used   Substance and Sexual Activity    Alcohol use: Not Currently     Comment: rare    Drug use: No    Sexual activity: Not Currently   Other Topics Concern    Not on file   Social History Narrative    · Most recent tobacco use screenin2018      · Do you currently or have you  served in the  [employer]:   Yes      · If Yes, What branch of service:   [employer]      · Occupation:   Dentists      · Exercise level:   Occasional        · Caffeine intake:   Heavy      · Marital status:         · Diet:   Regular  low fat     · Seat belts used routinely:   Yes      · Smoke alarm in home:   Yes      · Advance directive:   Yes      · General stress level:   Low      Social Drivers of Health     Financial Resource Strain: Low Risk  (2023)    Overall Financial Resource Strain (CARDIA)     Difficulty of Paying Living Expenses: Not very hard   Food Insecurity: No Food Insecurity (2025)    Nursing - Inadequate Food Risk Classification     Worried About Running Out of Food in the Last Year: Never true     Ran Out of Food in the Last Year: Never true     Ran Out of Food in the Last Year: Never true   Transportation Needs: No Transportation Needs (2/3/2025)    OASIS : Transportation     Lack of Transportation (Medical): No     Lack of Transportation (Non-Medical): No     Patient Unable or Declines to Respond: No   Physical Activity: Not on file   Stress: Not on file   Social Connections: Not on file   Intimate Partner Violence: Unknown (2025)    Nursing IPS     Feels Physically and Emotionally Safe: Not on file     Physically Hurt by Someone: Not on file     Humiliated or Emotionally Abused by Someone: Not on file     Physically Hurt by Someone: No     Hurt or Threatened by Someone: No   Housing Stability: Unknown (2025)    Nursing: Inadequate Housing Risk Classification     Has Housing: Not on file     Worried About Losing Housing: Not on file     Unable to Get Utilities: Not on file     Unable to Pay for Housing in the Last Year: No     Has Housin       Family History   Problem Relation Age of Onset    Heart attack Father     Other Sister         bypass and vlave replacement    Stroke Paternal Uncle     Arrhythmia Neg Hx     Asthma Neg Hx     Clotting disorder Neg Hx      "Fainting Neg Hx     Anuerysm Neg Hx     Hypertension Neg Hx         unsure     Hyperlipidemia Neg Hx     Heart failure Neg Hx        Physical Exam  Vitals and nursing note reviewed.   Constitutional:       General: He is not in acute distress.     Appearance: He is ill-appearing.   HENT:      Head: Normocephalic and atraumatic.   Eyes:      Conjunctiva/sclera: Conjunctivae normal.   Cardiovascular:      Rate and Rhythm: Regular rhythm. Bradycardia present.      Pulses: Intact distal pulses.      Heart sounds: Murmur heard.      Harsh midsystolic murmur is present with a grade of 3/6 at the upper right sternal border radiating to the neck.   Pulmonary:      Effort: Pulmonary effort is normal.      Breath sounds: Normal breath sounds.   Abdominal:      General: Bowel sounds are normal.      Palpations: Abdomen is soft.   Musculoskeletal:         General: Normal range of motion.      Cervical back: Normal range of motion and neck supple.   Skin:     General: Skin is warm and dry.      Coloration: Skin is pale.   Neurological:      Mental Status: He is alert and oriented to person, place, and time.         Vitals: There were no vitals taken for this visit.   Wt Readings from Last 3 Encounters:   04/04/25 78 kg (172 lb)   03/31/25 77.6 kg (171 lb)   02/20/25 74.8 kg (164 lb 12.8 oz)         Labs & Results:  Lab Results   Component Value Date    WBC 4.98 03/13/2025    HGB 9.4 (L) 03/13/2025    HCT 30.4 (L) 03/13/2025    MCV 98 03/13/2025     03/13/2025     BNP   Date Value Ref Range Status   01/06/2025 849 (H) 0 - 100 pg/mL Final     No components found for: \"CHEM\"    Results for orders placed during the hospital encounter of 05/06/21    Echo complete with contrast if indicated    [facility]  [address] [place] [address] [phone]    Transthoracic Echocardiogram  2D, M-mode, Doppler, and Color Doppler    Study date:  06-May-2021    Patient: TANNA BROWNLEE  MR number: " [id number]  Account number: [id number]  : 1945  Age: 76 years  Gender: Male  Status: Outpatient  Location: [facility]  Height: 70 in  Weight: 181 lb  BP: 150/ 58 mmHg    Indications: Assess the aortic valve.    Diagnoses: I35.0 - Nonrheumatic aortic (valve) stenosis    Sonographer:  Cyn Chapa RDCS  Primary Physician:  Simón Hadley MD  Referring Physician:  Israel Sanchez MD  Group:  [facility]  Interpreting Physician:  Brayden Ortiz MD    SUMMARY    LEFT VENTRICLE:  Systolic function was normal. Ejection fraction was estimated to be 60 %.  There were no regional wall motion abnormalities.  Wall thickness was mildly increased.  Doppler parameters were consistent with abnormal left ventricular relaxation (grade 1 diastolic dysfunction).    LEFT ATRIUM:  The atrium was mildly dilated.    MITRAL VALVE:  There was mild regurgitation.    AORTIC VALVE:  The valve was probably trileaflet. Leaflets exhibited moderately increased thickness and moderate calcification.  There was severe stenosis.  There was trace regurgitation.  Valve peak gradient was 63 mmHg.  Valve mean gradient was 32 mmHg.  Aortic valve area was 1 cmï¾² by the continuity equation.  Estimated aortic valve area (by VTI) was 0.91 cmï¾².    TRICUSPID VALVE:  There was trace regurgitation.    HISTORY: PRIOR HISTORY: CAD, bradycardia, DM, dyslipidemia, PAD, CABG,    PROCEDURE: The study was performed in the [facility]. This was a routine study. The transthoracic approach was used. The study included complete 2D imaging, M-mode, complete spectral Doppler, and color Doppler. The heart rate was  66 bpm, at the start of the study. Images were obtained from the parasternal, apical, subcostal, and suprasternal notch acoustic windows. Image quality was adequate.    LEFT VENTRICLE: Size was normal. Systolic function was normal. Ejection fraction was estimated to be 60 %. There were no regional wall motion  abnormalities. Wall thickness was mildly increased. DOPPLER: Doppler parameters were consistent  with abnormal left ventricular relaxation (grade 1 diastolic dysfunction).    RIGHT VENTRICLE: The size was normal. Systolic function was normal. Wall thickness was normal.    LEFT ATRIUM: The atrium was mildly dilated.    RIGHT ATRIUM: Size was normal.    MITRAL VALVE: Valve structure was normal. There was normal leaflet separation. DOPPLER: The transmitral velocity was within the normal range. There was no evidence for stenosis. There was mild regurgitation.    AORTIC VALVE: The valve was probably trileaflet. Leaflets exhibited moderately increased thickness and moderate calcification. DOPPLER: There was severe stenosis. There was trace regurgitation.    TRICUSPID VALVE: The valve structure was normal. There was normal leaflet separation. DOPPLER: The transtricuspid velocity was within the normal range. There was no evidence for stenosis. There was trace regurgitation.    PULMONIC VALVE: Leaflets exhibited normal thickness, no calcification, and normal cuspal separation. DOPPLER: The transpulmonic velocity was within the normal range. There was no significant regurgitation.    PERICARDIUM: There was no pericardial effusion. The pericardium was normal in appearance.    AORTA: The root exhibited normal size.    SYSTEMIC VEINS: IVC: The inferior vena cava was normal in size.    MEASUREMENT TABLES    2D MEASUREMENTS  LVOT   (Reference normals)  Diam   23 mm   (--)    DOPPLER MEASUREMENTS  LVOT   (Reference normals)  VTI   24 cm   (--)  R-R interval   1017 ms   (--)  HR   59 bpm   (--)  Stroke vol   99.71 ml   (--)  Cardiac output   5.88 L/min   (--)  Cardiac index   2.94 L/min/mï¾²   (--)  Aortic valve   (Reference normals)  VTI   108 cm   (--)  Peak gradient   63 mmHg   (--)  Mean gradient   32 mmHg   (--)  Valve area, cont   1 cmï¾²   (--)  Obstr index, VTI   0.22    (--)  Valve area, VTI   0.91 cmï¾²   (--)  Area index,  VTI   0.46 cmï¾²/mï¾²   (--)    SYSTEM MEASUREMENT TABLES    2D  %FS: 23.52 %  Ao Diam: 3.06 cm  EDV(Teich): 137.67 ml  EF(Teich): 46.6 %  ESV(Teich): 73.51 ml  IVSd: 1.26 cm  LA Area: 24.18 cm2  LA Diam: 4.19 cm  LVEDV MOD A4C: 198.32 ml  LVEF MOD A4C: 54.08 %  LVESV MOD A4C: 91.07 ml  LVIDd: 5.34 cm  LVIDs: 4.08 cm  LVLd A4C: 10.15 cm  LVLs A4C: 8.98 cm  LVOT Diam: 2.43 cm  LVPWd: 1.18 cm  RA Area: 14.06 cm2  RVIDd: 3.75 cm  SV MOD A4C: 107.24 ml  SV(Teich): 64.16 ml    CW  AV Env.Ti: 414.84 ms  AV MaxP.54 mmHg  AV VTI: 103.91 cm  AV Vmax: 3.82 m/s  AV Vmean: 2.5 m/s  AV meanP.08 mmHg    MM  TAPSE: 2.13 cm    PW  LUIS (VTI): 1.06 cm2  LUIS Vmax: 1.04 cm2  E' Sept: 0.04 m/s  E/E' Sept: 22.83  LVOT Env.Ti: 482.08 ms  LVOT VTI: 23.7 cm  LVOT Vmax: 0.86 m/s  LVOT Vmean: 0.5 m/s  LVOT maxP.94 mmHg  LVOT meanP.25 mmHg  LVSV Dopp: 110.25 ml  MV A Pop: 1.23 m/s  MV Dec Josephine: 5.05 m/s2  MV DecT: 180.67 ms  MV E Pop: 0.91 m/s  MV E/A Ratio: 0.74  MV PHT: 52.39 ms  MVA By PHT: 4.2 cm2    [employer] Accredited Echocardiography Laboratory    Prepared and electronically signed by    Brayden Ortiz MD  Signed 06-May-2021 16:40:31    No results found for this or any previous visit.        This note was completed in part utilizing [employer] direct voice recognition software.   Grammatical errors, random word insertion, spelling mistakes, and incomplete sentences may be an occasional consequence of the system secondary to software limitations, ambient noise and hardware issues. At the time of dictation, efforts were made to edit, clarify and /or correct errors.  Please read the chart carefully and recognize, using context, where substitutions have occurred.  If you have any questions or concerns about the context, text or information contained within the body of this dictation, please contact myself, the provider, for further clarification

## 2025-04-29 ENCOUNTER — OFFICE VISIT (OUTPATIENT)
Dept: CARDIOLOGY CLINIC | Facility: CLINIC | Age: 80
End: 2025-04-29
Payer: MEDICARE

## 2025-04-29 VITALS
HEART RATE: 55 BPM | DIASTOLIC BLOOD PRESSURE: 48 MMHG | OXYGEN SATURATION: 98 % | BODY MASS INDEX: 24.05 KG/M2 | SYSTOLIC BLOOD PRESSURE: 130 MMHG | WEIGHT: 167.6 LBS

## 2025-04-29 DIAGNOSIS — I65.23 ASYMPTOMATIC BILATERAL CAROTID ARTERY STENOSIS: Chronic | ICD-10-CM

## 2025-04-29 DIAGNOSIS — I50.32 CHRONIC DIASTOLIC CHF (CONGESTIVE HEART FAILURE) (HCC): ICD-10-CM

## 2025-04-29 DIAGNOSIS — N18.30 BENIGN HYPERTENSION WITH CHRONIC KIDNEY DISEASE, STAGE III (HCC): ICD-10-CM

## 2025-04-29 DIAGNOSIS — Z95.2 S/P TAVR (TRANSCATHETER AORTIC VALVE REPLACEMENT): Primary | ICD-10-CM

## 2025-04-29 DIAGNOSIS — Z95.1 S/P CABG (CORONARY ARTERY BYPASS GRAFT): Chronic | ICD-10-CM

## 2025-04-29 DIAGNOSIS — D64.9 CHRONIC ANEMIA: ICD-10-CM

## 2025-04-29 DIAGNOSIS — N18.4 TYPE 2 DIABETES MELLITUS WITH STAGE 4 CHRONIC KIDNEY DISEASE, WITHOUT LONG-TERM CURRENT USE OF INSULIN (HCC): ICD-10-CM

## 2025-04-29 DIAGNOSIS — E11.22 TYPE 2 DIABETES MELLITUS WITH STAGE 4 CHRONIC KIDNEY DISEASE, WITHOUT LONG-TERM CURRENT USE OF INSULIN (HCC): ICD-10-CM

## 2025-04-29 DIAGNOSIS — I70.213 ATHEROSCLEROSIS OF NATIVE ARTERIES OF EXTREMITIES WITH INTERMITTENT CLAUDICATION, BILATERAL LEGS (HCC): Chronic | ICD-10-CM

## 2025-04-29 DIAGNOSIS — I25.10 CORONARY ARTERY DISEASE INVOLVING NATIVE CORONARY ARTERY OF NATIVE HEART WITHOUT ANGINA PECTORIS: ICD-10-CM

## 2025-04-29 DIAGNOSIS — I12.9 BENIGN HYPERTENSION WITH CHRONIC KIDNEY DISEASE, STAGE III (HCC): ICD-10-CM

## 2025-04-29 DIAGNOSIS — E78.2 MIXED HYPERLIPIDEMIA: ICD-10-CM

## 2025-04-29 DIAGNOSIS — I10 PRIMARY HYPERTENSION: ICD-10-CM

## 2025-04-29 DIAGNOSIS — I47.29 NSVT (NONSUSTAINED VENTRICULAR TACHYCARDIA) (HCC): ICD-10-CM

## 2025-04-29 PROBLEM — I50.33 ACUTE ON CHRONIC HEART FAILURE WITH PRESERVED EJECTION FRACTION (HFPEF) (HCC): Status: RESOLVED | Noted: 2025-01-06 | Resolved: 2025-04-29

## 2025-04-29 PROBLEM — I25.5 ISCHEMIC CARDIOMYOPATHY: Status: RESOLVED | Noted: 2025-01-06 | Resolved: 2025-04-29

## 2025-04-29 PROBLEM — I70.211 ATHEROSCLEROSIS OF NATIVE ARTERIES OF EXTREMITIES WITH INTERMITTENT CLAUDICATION, RIGHT LEG (HCC): Chronic | Status: RESOLVED | Noted: 2025-01-06 | Resolved: 2025-04-29

## 2025-04-29 PROCEDURE — 99214 OFFICE O/P EST MOD 30 MIN: CPT | Performed by: NURSE PRACTITIONER

## 2025-04-29 NOTE — LETTER
2025     Simón Hadley MD  [address] [facility] [address]    Patient: Jay Roach Jr.   YOB: 1945   Date of Visit: 2025       Dear Dr. Simón aHdley MD:    Thank you for referring Jay Roach to me for evaluation. Below are my notes for this consultation.    If you have questions, please do not hesitate to call me. I look forward to following your patient along with you.         Sincerely,        ZHANNA Saleh        CC: No Recipients    ZHANNA Saleh  2025  1:43 PM  Sign when Signing Visit  Cardiology  Follow Up   Office Visit Note     Jay Roach Jr.   80 y.o.   male   MRN: [id number]  [facility] [place]  [address] [place] [address]  [address]  [facility] [id number] [phone]    PCP: Simón Hadley MD  Cardiologist : Dr Sanchez  Neph: Dr Muñoz              Summary of Recommendations  Low-sodium diet, Heart failure education as below  Encouraged hydration 64 oz a day.  I suspect he is consuming much less  Lipid profile with next labs.  Also requested a HgA1c ( DM2)  CBC (chronic anemia).  BMP for renal  Lifelong SBE prophylaxis  Follow-up with Dr. Sanchez 7/15/25  Colon Ca screenin2023 , up-to-date      Impression/plan  Aortic Stenosis, severe, S/P # 29 mm TAVR.    Lifelong SBE prophylaxis  On a statin  CAD   S/P  CABG x2 2013  [facility] 22: BURAK x 1 to pRCA, 10% residual stenosis, 50% mid RCA lesion medically managed; Patent LIMA to LAD, and patent SVG to OM(pre TAVR workup)  SPECT : no ischemia  On aspirin, Plavix ( PVD), statin, BB  Chronic diastolic heart failure. He has lost wt  Wt Readings from Last 3 Encounters:   25 76 kg (167 lb 9.6 oz)   25 78 kg (172 lb)   25 77.6 kg (171 lb)   '  --beta-blocker:  on metoprolol succinate 12.5 mg b.i.d.   --Diuretic:   torsemide 10 mg daily plus Klor-Con 20 mEq daily + mag.    --2 g sodium diet, 1800 cc fluid restriction. Daily  weights  Hypertension, essential.   /48   on amlodipine 10 mg daily, hydralazine 75 mg twice daily, metoprolol tartrate 12.5 mg every 12, torsemide 10 mg daily  Hyperlipidemia.   On atorvastatin 80 mg/d.   5/2022:LDL 33, non HDL 43  Due for reassessment of his lipids.  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    Baseline creatinine 1. 8- 2.2, recently rising, now 2.79. Encouraged hydration.  Has a BMP next week for renal. He appears dry  follows with Nephrology Dr. Martinez  PVD.  Followed by vascular surgery-last seen February 2025  Bilateral carotid stenosis, asymptomatic  S/P left fem to anterior tibial bypass by Dr Urena 2015, s/p  Angioplasty to bypass graft stenoses 2016, recurrent bypass graft stenosis  Right femoral endarterectomy and extensive shock wave of right SFA in March 2023  Mesenteric ischemia status post retrograde open mesenteric stent with  general surgery and vascular surgery team  12/24  PVCs, noted on telemetry January 2025, up to 18 beats, in the setting of hypokalemia, hypomagnesemia  Recurrent GIB , lastly in Jan 2024  Non-small cell -squamous cell, status post radiation therapy,  followed by radiation oncology.  Diagnosed 1/2024. CT chest pending  Iron deficiency anemia.   History ischemic colitis.    Tobacco abuse --quit December 2024  Cardiac testing  TTE 5/6/21 EF 60%.  No are WMA.  Grade 1 DD.  Mild left atrial enlargement.  Mild MR.  Severe aortic stenosis.  Peak gradient 63 mean gradient 32.  LUIS 1 cm2 by the continuity equation.  TTE 3/24/22 EF 50%.  Grade 2 DD.  Mild LVH.  Moderate hypokinesis of the basal to mid inferior wall.  RV size and function normal.  Mild left atrial enlargement.  Mild AI, Severe aortic stenosis.  Moderate MR  Holter monitor 4/5/22 occasional bradycardia with maximum of 2.7 second pauses  C 5/5/22.Patent LIMA-LAD; Patent SVG-OM; Ungrafted proximal RCA with calcific lesion requiring PCI pre TAVR consideration        Due to CKD with creatinine 2.3-2.8 range (GFR  <25) will need staged complex PCI with pre admission hydration night before.  [facility] 5/24/22 PCI/BURAK proximal RCA lesion.  Residual 10% stenosis.  50% mid RCA lesion medically managed  TTE 6/15/22.  EF 60%.  Grade 1 DD.  Mild LAE.There is an Smith KYLER 3 29 mm TAVR bioprosthetic valve. The prosthetic valve appears well-seated and appears to be functioning normally. Prosthetic valve leaflet motion is normal. There is no evidence of paravalvular regurgitation. There is no evidence of transvalvular regurgitation. The gradient recorded across the prosthetic aortic valve is within the expected range. The aortic valve peak velocity is 1.84 m/s. The aortic valve mean gradient is 8 mmHg. The DVI is 0.49. The aortic valve area is 2.04 cm2. Mitral Valve: There is mild annular calcification.  No evidence of MR  SPECT 8/22: no ischemia  TTE 6/8/23. EF 65%.  Wall motion normal.  Grade 1 DD.There is an Smith KYLER 3 Ultra 29 mm TAVR bioprosthetic valve. The prosthetic valve appears well-seated and appears to be functioning normally.  Trace PVL.  No TVR. The gradient recorded across the prosthetic aortic valve is within the expected range. The aortic valve peak velocity is 1.81 m/s. The aortic valve peak gradient is 13 mmHg. The aortic valve mean gradient is 8 mmHg.  Aortic valve area is 1.76 cm².  TTE 1/8/2025.  EF 65%.  Mild LVH.  Grade 1 DD.  RV normal size and function.  Moderate LAE.There is an Smith KYLER 3 Ultra 29 mm TAVR bioprosthetic valve.  Trace TR.  No PVR. There is no evidence of paravalvular regurgitation. The gradient recorded across the prosthetic aortic valve is within the expected range. The aortic valve mean gradient is 7 mmHg. The dimensionless velocity index is 0.51. The aortic valve area is 2.21 cm2.  Mild mitral valve thickening with moderate calcification moderate chordal involvement.  Mild MR               HPI  Jay Roach . is a 77 y.o. year old male with CAD, AS, PVD, HTN, HL, CKD,  ongoing tobacco abuse and chronic anemia.  He underwent CABG x2 September 2013.  His ejection fraction at that time was preserved.  He has had left lower extremity revascularization and carotid disease, followed by vascular surgery.  He has also had sinus bradycardia which has improved, with the discontinuation of beta-blockers.  Due to progressive severe symptomatic AS he was referred to CT surgery for evaluation for TAVR.  He was seen in the office September 2020 by Dr. Urbina.  At that time the patient decided to pursue evaluation of neurological complaints prior to consideration for TAVR.  He has followed with Dr. Sanchez.  He was last seen in the office 3/2/22.  Encouraged follow-up with CT surgery  Home meds include amlodipine 10 mg daily, benazepril 40 mg daily, and hydralazine 50 mg t.i.d..  Patient not on diuretics at home    Adm 3/22-3/25/22  CC:  Shortness of breath, EDMARCO times 1-2 weeks, lower extremity edema, orthopnea  HsTn : 674 --> 588 --> 727  ECG - NSR, LVh with repolarization abnormalities  CXR -vascular congestion and small right effusion, NT proBNP 34 000.Creatinine was 2.67, from baseline of 1.7-1.8  Diuresed with Lasix 80 IV b.i.d.  Echo: EF 50, grade 2 DD.  Severe AS  Discharge creatinine :  2.5   Discharge weight 167 lb   Discharge diuretic:  Torsemide 60 mg daily  Hydralazine and benazepril were discontinued      3/28/22   labs: Cr 2.85, BUN 58  K 4.3  Hemoglobin 8.1 hematocrit 27.4.      H&H March 25th were 7.1/ 22.9      3/31/22  Hospital follow-up: acute diastolic heart failure secondary to severe aortic stenosis, CKD 4.  EF 50. CTS eval 4/22  ROS:  Overall he tells me his breathing has improved.  No chest pain.  He is able to lie flat, no orthopnea.DEMARCO with steps.  No CP.  No lightheadedness or dizziness.  He is quite aware of his labs, and concerned about his renal function   His weight is down  Wt Readings from Last 3 Encounters:   04/04/25 78 kg (172 lb)   03/31/25 77.6 kg (171  lb)   02/20/25 74.8 kg (164 lb 12.8 oz)   Blood pressure 110/58.  Heart rate today 59 and regular by exam  Social history:  I suspect some sodium dietary indiscretion in the past.  We discussed the importance of a salt restricted diet today  We reviewed his most recent labs, concern is his renal function, and anemia, bradycardia while hospitalized  He is not taking the iron supplement because it is constipating .  No melena or hematochezia  Today will decrease his torsemide to 20 mg b.i.d., reassess his labs in 1 week   In review his meds he is also still on metformin given his CKD, recommend he stop it and follow-up with his PCP as well as his nephrologist      Interval history  [facility] 80% RCA lesion.      5/11/22 OV Dr Sanchez  + DEMARCO  Holter monitor demonstrated occasional bradycardia with maximum of 2.7 second pauses  Bradycardia, improved off beta-blockers  Thinking about TAVR  Issues with ischemic colitis, starting iron  Follow-up 3 months    Adm 5/23-5/25/22  [facility]: PCI / BURAK x 1 to pRCA, 10% residual stenosis; 50% mid RCA-medically managed; Patent LIMA to LAD, and patent SVG to OM  DAPT with aspirin and Plavix  Followed by Nephrology  Discharge creatinine 2.0    Adm 6/14-6/17/22.   Elective TAVR  Postprocedure echo shows an EF of 60%, a well-seated aortic valve, no MR  Discharged on metoprolol succinate 12.5 mg b.i.d. for bigeminy with PVCs  Started on lisinopril 10 mg daily  Discharge creatinine 2.45  Discharge weight: 167 lb      6/28.  Office visit Nephrology  Current meds: Amlodipine 10 mg daily, lisinopril 10 mg, Metoprolol 12.5 mg bid, Torsemide 20 mg bid   BP high in the office but acceptable at home no med changes  Recommend continuing lisinopril  Declined iron, given constipation    7/7/22  He is a 77 y.o. year old male with CAD, AS, PVD, HTN, HL, CKD, ongoing tobacco abuse and chronic anemia.  He underwent CABG x2 September 2013.  Found  To have worsening AS, referred to CT surgery  [facility] 5/24/22: BURAK x 1  to pRCA, 10% residual stenosis, 50% mid RCA lesion medically managed; Patent LIMA to LAD, and patent SVG to OM (pre TAVR workup)  TAVR 6/14/22.  EF preserved  ROS: occ dizzy. He backed off on some of his meds- still symptomatic.  C/O leg heaviness with walking.  Denies chest pain, SOB.  Mostly concerned about his dizziness  Currently on On Toprol 12.5 daily, torsemide 20/d-  he's been taking it daily the last few days given nocturia. K 20 meq/d. Mag ox 200 weekly  Last labs 6/20; cr 2.26  BUN 53  K 4.3.  H/H  9.3/30  Wt stable 166 lb  BP up 162/45 by the automated machine by me. Left arm  EKG sinus bradycardia first-degree AV block 55 beats per minute deep T-wave inversions most pronounced anteriorly laterally.  Nonspecific changes inferiorly  I reviewed his case in EKG briefly with Dr. Pastrana was in the office today  Recommended stress test and echocardiogram.  The echo has previously been scheduled.  Will pursue a nuclear stress test.  The patient is in agreement  For now, will continue Toprol 12.5 mg daily, encouraged torsemide 20 mg b.i.d. This may help his blood pressure is well    Interval history  Adm 3/2/82568: Elective vascular surgery  elective right common femoral endarterectomy, bovine patch angioplasty with antegrade SFA/pop PTA/shockwave angioplasty.      was transfused 2 units in the operating room for acute blood loss anemia.    He received 1 more unit of PRBC over the weekend after surgery.      Adm 12/4-12/10/2023: GIB  He presented with melena and BRBPR  Aspirin Plavix were held  Underwent EGD and C-scope  EGD showed a single ulcer in the fundus of the stomach, ablated with heater probe.  Clips placed successfully  C-scope unrevealing for acute problem  cleared by GI for DC on 12/10/23 after restarting asa and plavix the day before. He had a normal BM this am light brown and formed.        Adm 1/11-1/13/25 GIB  found to be anemic and required 1 unit PRBC.    Patient also underwent an EGD requiring 5  clips.     Adm 4/17-4/19/24 Lung mass/hemoptysis after procedure  CC: Shortness of breath, hemoptysis  As an outpatient he was undergoing an IR guided lung biopsy however the patient moved during the procedure, he had a complication of hypoxia due to pulmonary hemorrhage, hemoptysis.  He received nebulized TXA  Hospitalized for observation  The patient reported he stopped Plavix 5 days prior to the procedure    11/15/2024 OV Dr. Sanchez  Weight was 174 pounds   on torsemide 10 mg daily    Adm 12/24 - 1/3/2025 [facility] [place] Acute mesenteric ischemia  CTA abdomen/pelvis which was significant for fat stranding and portal venous gas highly suspicious for mesenteric ischemia  Patient transferred to [facility] for further intervention with general surgery and vascular surgery.     He underwent an exploratory laparotomy with retrograde, open mesenteric SMA 12/24/2024    Followed by nephrology and geriatrics for management of his CKD and delirium, respectively.    Other discharge cardiac medications: Norvasc 10, Plavix 75, Lasix 43 times a week, hydralazine 50 twice daily, Metroprolol tartrate 12.5 every 12    Discharge weight: 182 pound  Discharge creatinine 2.0.  Peak creatinine was 2.7  Discharge diuretic Lasix 40 mg 3 times a week Tuesday, Thursday, Saturday, chronically he was on torsemide 10 mg daily.  Due to SHOAIB his diuretics were decreased by nephrology    Adm 1/6-1/11/25 ADHF  CC:increasing lower extremity swelling, abdominal distention as well as increasing shortness of breath. No reports of chest pain or pressure.    Chest x-ray small bilateral pleural effusions  Troponins negative  He was initially placed on Lasix 40 mg twice daily IV  Nephrology subsequently started him on torsemide with good diuretic response, however his creatinine continued to rise   H&H stable. 7.8/24.2       1/15/25 OV Dr Sanchez   Per his note:  HPI: Jay SATNAM Roach Jr. is a 79 y.o. year old male with coronary artery disease s/p  "CABG with PCI of RCA 5/22, AS s.o TAVR 6/22, HTN, Dyslipidemia and LLE revascularization who returns for follow up. No chest pain, shortness of breath, or palpitations. Stress test 8/22 - no ischemia.  Echo 7/20 - normal cardiac function with normal TAVR. Does have headache.  Underwent RLE revascularization 3/23. Echo 6/23 - EF 65%, normal TAVR. Had GI bleeding in 12/23 and 1/24. Patient admitted to hospital 12/24/24 with mesenteric ischemia, and underwent ex-lap with retrograde open mesenteric stent.  Discharged on 1/3/24, but readmitted 1/6 24 with CHF exacerbation with SHOAIB - started on IV diuretics.  Echo 1/8/25 demonstrated normal LV systolic function, normal TAVR, mild MR.Cr 1/11/25 - 2.4. Major complaint is fatigue and dyspnea.    Impression:  1. Coronary artery disease s/p CABG - s/p recent PCI of RCA  2. Hypertension - borderline control.  3. Severe bilateral carotid disease - followed by vascular surgery.  4. Dyslipidemia - doing well.  5. Peripheral arterial disease - s/p LLE revascularization and RLE revascularization 3/23.  6. Aortic stenosis - s/p TAVR 6/22  7. Bradycardia - on low-dose b-blockers.   8. CKD - CKD IV Cr 2.4  9.  Mesenteric ischemia - s/p stent 12/24.     Recommendations:  1. Continue current medications.   2. Increase fluid to 2000 ml/day.  3. Follow up in 4 months.        4/29/25  Cardiology follow-up  PMH:CAD, S/P CABG ;AS, S/P TAVR, chronic HFpEF,HTN, HL, CKD, diabetes, tobacco abuse-recently quit, lung CA status post radiation therapy,  chronic anemia, mesenteric ischemia status post open mesenteric stent 12/2024.  PVD with left lower extremity CFA stent, asymptomatic carotid artery stenosis    On Plavix after his most recent stent    Of note that his creatinine continues to rise 2.79  OFF Jardiance.  OFF lisinopril   ROS: No CP.  No SOB.  No0 palpitation.  + weak. No PND/ orthopnea.  No HS cough.  No LE edema. Frequent urination. \" I think Im on the way out\"    /48  Weight 167 " now.  Was 171 lb in March.  Is eating 2 meals a day, trying to not overdo the protein.  Chronic pain in his legs.  Can't walk far before he has to stop. Rest helps the pain,         Assessment  Chronic HFpEF  Status post CABG  Status post TAVR  Hypertension  CKD 4  History of mesenteric ischemia  NSVT  CAD  Chronic anemia  PVD    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 ([facility]) 09/10/2021   • Atherosclerosis of native arteries of extremities with intermittent claudication, right leg ([facility]) 01/06/2025   • 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 ([facility]) 02/24/2023   • Basal cell carcinoma     right cheek   • CAD (coronary artery disease)    • Carotid stenosis, asymptomatic, bilateral    • Chronic kidney disease    • Colon polyp    • Dependence on respirator (ventilator) status ([facility]) 04/07/2023   • Diabetes ([facility])     type 2, non-insulin dependent   • Diabetes mellitus ([facility])    • GERD (gastroesophageal reflux disease)    • History of nephrolithiasis    • Hyperlipidemia    • Hypertension    • Left foot pain 11/30/2022   • Lung cancer ([facility])    • PAD (peripheral artery disease) ([facility])    • Portal Venous Gas 12/24/2024   • Severe aortic stenosis    • Squamous cell skin cancer 11/22/2022    left superior helix       Review of Systems   Constitutional: Positive for malaise/fatigue and weight loss. Negative for chills.   Cardiovascular:  Negative for chest pain, claudication, cyanosis, dyspnea on exertion, irregular heartbeat, leg swelling, near-syncope, orthopnea, palpitations, paroxysmal nocturnal dyspnea and syncope.        + RLE claudication   Respiratory:  Negative for cough and shortness of breath.    Gastrointestinal:  Negative for heartburn and nausea.   Neurological:  Negative for dizziness, focal weakness, headaches, light-headedness and weakness.   All other systems reviewed and are  negative.      No Known Allergies  .    Current Outpatient Medications:   •  allopurinol (ZYLOPRIM) 300 mg tablet, Take 1 tablet (300 mg total) by mouth daily, Disp: 90 tablet, Rfl: 1  •  amLODIPine (NORVASC) 10 mg tablet, Take 1 tablet (10 mg total) by mouth daily, Disp: 90 tablet, Rfl: 1  •  atorvastatin (LIPITOR) 80 mg tablet, Take 1 tablet (80 mg total) by mouth daily at bedtime, Disp: 90 tablet, Rfl: 1  •  calcitriol (ROCALTROL) 0.25 mcg capsule, TAKE 1 CAPSULE DAILY, Disp: 90 capsule, Rfl: 1  •  clopidogrel (PLAVIX) 75 mg tablet, TAKE 1 TABLET DAILY, Disp: 90 tablet, Rfl: 3  •  ferrous sulfate 325 (65 Fe) mg tablet, Take 325 mg by mouth every other day, Disp: , Rfl:   •  glimepiride (AMARYL) 1 mg tablet, Take 1 tablet (1 mg total) by mouth daily with breakfast, Disp: 90 tablet, Rfl: 1  •  hydrALAZINE (APRESOLINE) 25 mg tablet, Take 3 tablets (75 mg total) by mouth 2 (two) times a day, Disp: 540 tablet, Rfl: 3  •  magnesium Oxide (MAG-OX) 400 mg TABS, Take 1 tablet (400 mg total) by mouth 2 (two) times a day, Disp: 180 tablet, Rfl: 1  •  metoprolol tartrate (LOPRESSOR) 25 mg tablet, Take 0.5 tablets (12.5 mg total) by mouth every 12 (twelve) hours, Disp: 90 tablet, Rfl: 1  •  pancrelipase, Lip-Prot-Amyl, (Creon) 24,000 units, Take 24,000 units of lipase by mouth 3 (three) times a day with meals, Disp: 300 capsule, Rfl: 1  •  pantoprazole (PROTONIX) 40 mg tablet, Take 1 tablet (40 mg total) by mouth 2 (two) times a day, Disp: 180 tablet, Rfl: 1  •  potassium chloride (Klor-Con M20) 20 mEq tablet, Take 1 tablet (20 mEq total) by mouth daily, Disp: 90 tablet, Rfl: 1  •  torsemide (DEMADEX) 10 mg tablet, Take 1 tablet (10 mg total) by mouth daily, Disp: , Rfl:     Social History     Socioeconomic History   • Marital status:      Spouse name: Not on file   • Number of children: Not on file   • Years of education: Not on file   • Highest education level: Not on file   Occupational History   • Not on file    Tobacco Use   • Smoking status: Every Day     Current packs/day: 1.00     Average packs/day: 0.6 packs/day for 100.0 years (62.5 ttl pk-yrs)     Types: Cigarettes     Passive exposure: Current   • Smokeless tobacco: Never   • Tobacco comments:     1-2 cigarettes per day   Vaping Use   • Vaping status: Never Used   Substance and Sexual Activity   • Alcohol use: Not Currently     Comment: rare   • Drug use: No   • Sexual activity: Not Currently   Other Topics Concern   • Not on file   Social History Narrative    · Most recent tobacco use screenin2018      · Do you currently or have you served in the [place] [employer]:   Yes      · If Yes, What branch of service:   [employer]      · Occupation:   Dentists      · Exercise level:   Occasional        · Caffeine intake:   Heavy      · Marital status:         · Diet:   Regular  low fat     · Seat belts used routinely:   Yes      · Smoke alarm in home:   Yes      · Advance directive:   Yes      · General stress level:   Low      Social Drivers of Health     Financial Resource Strain: Low Risk  (2023)    Overall Financial Resource Strain (CARDIA)    • Difficulty of Paying Living Expenses: Not very hard   Food Insecurity: No Food Insecurity (2025)    Nursing - Inadequate Food Risk Classification    • Worried About Running Out of Food in the Last Year: Never true    • Ran Out of Food in the Last Year: Never true    • Ran Out of Food in the Last Year: Never true   Transportation Needs: No Transportation Needs (2/3/2025)    OASIS : Transportation    • Lack of Transportation (Medical): No    • Lack of Transportation (Non-Medical): No    • Patient Unable or Declines to Respond: No   Physical Activity: Not on file   Stress: Not on file   Social Connections: Not on file   Intimate Partner Violence: Unknown (2025)    Nursing IPS    • Feels Physically and Emotionally Safe: Not on file    • Physically Hurt by Someone: Not on file    • Humiliated or  Emotionally Abused by Someone: Not on file    • Physically Hurt by Someone: No    • Hurt or Threatened by Someone: No   Housing Stability: Unknown (2025)    Nursing: Inadequate Housing Risk Classification    • Has Housing: Not on file    • Worried About Losing Housing: Not on file    • Unable to Get Utilities: Not on file    • Unable to Pay for Housing in the Last Year: No    • Has Housin       Family History   Problem Relation Age of Onset   • Heart attack Father    • Other Sister         bypass and vlave replacement   • Stroke Paternal Uncle    • Arrhythmia Neg Hx    • Asthma Neg Hx    • Clotting disorder Neg Hx    • Fainting Neg Hx    • Anuerysm Neg Hx    • Hypertension Neg Hx         unsure    • Hyperlipidemia Neg Hx    • Heart failure Neg Hx        Physical Exam  Vitals and nursing note reviewed.   Constitutional:       General: He is not in acute distress.     Appearance: He is ill-appearing.   HENT:      Head: Normocephalic and atraumatic.   Eyes:      Conjunctiva/sclera: Conjunctivae normal.   Cardiovascular:      Rate and Rhythm: Regular rhythm. Bradycardia present.      Pulses: Intact distal pulses.      Heart sounds: Murmur heard.      Harsh midsystolic murmur is present with a grade of 3/6 at the upper right sternal border radiating to the neck.   Pulmonary:      Effort: Pulmonary effort is normal.      Breath sounds: Normal breath sounds.   Abdominal:      General: Bowel sounds are normal.      Palpations: Abdomen is soft.   Musculoskeletal:         General: Normal range of motion.      Cervical back: Normal range of motion and neck supple.   Skin:     General: Skin is warm and dry.      Coloration: Skin is pale.   Neurological:      Mental Status: He is alert and oriented to person, place, and time.         Vitals: There were no vitals taken for this visit.   Wt Readings from Last 3 Encounters:   25 78 kg (172 lb)   25 77.6 kg (171 lb)   25 74.8 kg (164 lb 12.8 oz)  "        Labs & Results:  Lab Results   Component Value Date    WBC 4.98 2025    HGB 9.4 (L) 2025    HCT 30.4 (L) 2025    MCV 98 2025     2025     BNP   Date Value Ref Range Status   2025 849 (H) 0 - 100 pg/mL Final     No components found for: \"CHEM\"    Results for orders placed during the hospital encounter of 21    Echo complete with contrast if indicated    [id number] [place], [place] [id number] [phone]    Transthoracic Echocardiogram  2D, M-mode, Doppler, and Color Doppler    Study date:  06-May-2021    Patient: TANNA BROWNLEE  MR number: [id number]  Account number: [id number]  : 1945  Age: 76 years  Gender: Male  Status: Outpatient  Location: [facility]  Height: 70 in  Weight: 181 lb  BP: 150/ 58 mmHg    Indications: Assess the aortic valve.    Diagnoses: I35.0 - Nonrheumatic aortic (valve) stenosis    Sonographer:  Cyn Chapa RDCS  Primary Physician:  Simón Hadley MD  Referring Physician:  Israel Sanchez MD  Group:  [facility]  Interpreting Physician:  Brayden Ortiz MD    SUMMARY    LEFT VENTRICLE:  Systolic function was normal. Ejection fraction was estimated to be 60 %.  There were no regional wall motion abnormalities.  Wall thickness was mildly increased.  Doppler parameters were consistent with abnormal left ventricular relaxation (grade 1 diastolic dysfunction).    LEFT ATRIUM:  The atrium was mildly dilated.    MITRAL VALVE:  There was mild regurgitation.    AORTIC VALVE:  The valve was probably trileaflet. Leaflets exhibited moderately increased thickness and moderate calcification.  There was severe stenosis.  There was trace regurgitation.  Valve peak gradient was 63 mmHg.  Valve mean gradient was 32 mmHg.  Aortic valve area was 1 cmï¾² by the continuity equation.  Estimated aortic valve area (by VTI) was 0.91 cmï¾².    TRICUSPID VALVE:  There was trace " regurgitation.    HISTORY: PRIOR HISTORY: CAD, bradycardia, DM, dyslipidemia, PAD, CABG,    PROCEDURE: The study was performed in the [facility]. This was a routine study. The transthoracic approach was used. The study included complete 2D imaging, M-mode, complete spectral Doppler, and color Doppler. The heart rate was  66 bpm, at the start of the study. Images were obtained from the parasternal, apical, subcostal, and suprasternal notch acoustic windows. Image quality was adequate.    LEFT VENTRICLE: Size was normal. Systolic function was normal. Ejection fraction was estimated to be 60 %. There were no regional wall motion abnormalities. Wall thickness was mildly increased. DOPPLER: Doppler parameters were consistent  with abnormal left ventricular relaxation (grade 1 diastolic dysfunction).    RIGHT VENTRICLE: The size was normal. Systolic function was normal. Wall thickness was normal.    LEFT ATRIUM: The atrium was mildly dilated.    RIGHT ATRIUM: Size was normal.    MITRAL VALVE: Valve structure was normal. There was normal leaflet separation. DOPPLER: The transmitral velocity was within the normal range. There was no evidence for stenosis. There was mild regurgitation.    AORTIC VALVE: The valve was probably trileaflet. Leaflets exhibited moderately increased thickness and moderate calcification. DOPPLER: There was severe stenosis. There was trace regurgitation.    TRICUSPID VALVE: The valve structure was normal. There was normal leaflet separation. DOPPLER: The transtricuspid velocity was within the normal range. There was no evidence for stenosis. There was trace regurgitation.    PULMONIC VALVE: Leaflets exhibited normal thickness, no calcification, and normal cuspal separation. DOPPLER: The transpulmonic velocity was within the normal range. There was no significant regurgitation.    PERICARDIUM: There was no pericardial effusion. The pericardium was normal in appearance.    AORTA: The root  exhibited normal size.    SYSTEMIC VEINS: IVC: The inferior vena cava was normal in size.    MEASUREMENT TABLES    2D MEASUREMENTS  LVOT   (Reference normals)  Diam   23 mm   (--)    DOPPLER MEASUREMENTS  LVOT   (Reference normals)  VTI   24 cm   (--)  R-R interval   1017 ms   (--)  HR   59 bpm   (--)  Stroke vol   99.71 ml   (--)  Cardiac output   5.88 L/min   (--)  Cardiac index   2.94 L/min/mï¾²   (--)  Aortic valve   (Reference normals)  VTI   108 cm   (--)  Peak gradient   63 mmHg   (--)  Mean gradient   32 mmHg   (--)  Valve area, cont   1 cmï¾²   (--)  Obstr index, VTI   0.22    (--)  Valve area, VTI   0.91 cmï¾²   (--)  Area index, VTI   0.46 cmï¾²/mï¾²   (--)    SYSTEM MEASUREMENT TABLES    2D  %FS: 23.52 %  Ao Diam: 3.06 cm  EDV(Teich): 137.67 ml  EF(Teich): 46.6 %  ESV(Teich): 73.51 ml  IVSd: 1.26 cm  LA Area: 24.18 cm2  LA Diam: 4.19 cm  LVEDV MOD A4C: 198.32 ml  LVEF MOD A4C: 54.08 %  LVESV MOD A4C: 91.07 ml  LVIDd: 5.34 cm  LVIDs: 4.08 cm  LVLd A4C: 10.15 cm  LVLs A4C: 8.98 cm  LVOT Diam: 2.43 cm  LVPWd: 1.18 cm  RA Area: 14.06 cm2  RVIDd: 3.75 cm  SV MOD A4C: 107.24 ml  SV(Teich): 64.16 ml    CW  AV Env.Ti: 414.84 ms  AV MaxP.54 mmHg  AV VTI: 103.91 cm  AV Vmax: 3.82 m/s  AV Vmean: 2.5 m/s  AV meanP.08 mmHg    MM  TAPSE: 2.13 cm    PW  LUIS (VTI): 1.06 cm2  LUIS Vmax: 1.04 cm2  E' Sept: 0.04 m/s  E/E' Sept: 22.83  LVOT Env.Ti: 482.08 ms  LVOT VTI: 23.7 cm  LVOT Vmax: 0.86 m/s  LVOT Vmean: 0.5 m/s  LVOT maxP.94 mmHg  LVOT meanP.25 mmHg  LVSV Dopp: 110.25 ml  MV A Pop: 1.23 m/s  MV Dec Weston: 5.05 m/s2  MV DecT: 180.67 ms  MV E Pop: 0.91 m/s  MV E/A Ratio: 0.74  MV PHT: 52.39 ms  MVA By PHT: 4.2 cm2    [facility]    Prepared and electronically signed by    Brayden Ortiz MD  Signed 06-May-2021 16:40:31    No results found for this or any previous visit.        This note was completed in part utilizing m-modal fluency direct voice  recognition software.   Grammatical errors, random word insertion, spelling mistakes, and incomplete sentences may be an occasional consequence of the system secondary to software limitations, ambient noise and hardware issues. At the time of dictation, efforts were made to edit, clarify and /or correct errors.  Please read the chart carefully and recognize, using context, where substitutions have occurred.  If you have any questions or concerns about the context, text or information contained within the body of this dictation, please contact myself, the provider, for further clarification

## 2025-05-01 ENCOUNTER — APPOINTMENT (OUTPATIENT)
Dept: LAB | Facility: MEDICAL CENTER | Age: 80
End: 2025-05-01
Attending: INTERNAL MEDICINE
Payer: MEDICARE

## 2025-05-01 DIAGNOSIS — N18.4 TYPE 2 DIABETES MELLITUS WITH STAGE 4 CHRONIC KIDNEY DISEASE, WITHOUT LONG-TERM CURRENT USE OF INSULIN (HCC): ICD-10-CM

## 2025-05-01 DIAGNOSIS — N18.4 BENIGN HYPERTENSION WITH CKD (CHRONIC KIDNEY DISEASE) STAGE IV (HCC): ICD-10-CM

## 2025-05-01 DIAGNOSIS — I12.9 BENIGN HYPERTENSION WITH CKD (CHRONIC KIDNEY DISEASE) STAGE IV (HCC): ICD-10-CM

## 2025-05-01 DIAGNOSIS — Z95.1 S/P CABG (CORONARY ARTERY BYPASS GRAFT): Chronic | ICD-10-CM

## 2025-05-01 DIAGNOSIS — D64.9 CHRONIC ANEMIA: ICD-10-CM

## 2025-05-01 DIAGNOSIS — E11.22 TYPE 2 DIABETES MELLITUS WITH STAGE 4 CHRONIC KIDNEY DISEASE, WITHOUT LONG-TERM CURRENT USE OF INSULIN (HCC): ICD-10-CM

## 2025-05-01 DIAGNOSIS — N18.4 CKD (CHRONIC KIDNEY DISEASE) STAGE 4, GFR 15-29 ML/MIN (HCC): ICD-10-CM

## 2025-05-01 DIAGNOSIS — R80.1 PERSISTENT PROTEINURIA, UNSPECIFIED: ICD-10-CM

## 2025-05-01 LAB
ANION GAP SERPL CALCULATED.3IONS-SCNC: 7 MMOL/L (ref 4–13)
BUN SERPL-MCNC: 53 MG/DL (ref 5–25)
CALCIUM SERPL-MCNC: 9.1 MG/DL (ref 8.4–10.2)
CHLORIDE SERPL-SCNC: 107 MMOL/L (ref 96–108)
CO2 SERPL-SCNC: 25 MMOL/L (ref 21–32)
CREAT SERPL-MCNC: 2.68 MG/DL (ref 0.6–1.3)
ERYTHROCYTE [DISTWIDTH] IN BLOOD BY AUTOMATED COUNT: 15.3 % (ref 11.6–15.1)
EST. AVERAGE GLUCOSE BLD GHB EST-MCNC: 140 MG/DL
GFR SERPL CREATININE-BSD FRML MDRD: 21 ML/MIN/1.73SQ M
GLUCOSE P FAST SERPL-MCNC: 101 MG/DL (ref 65–99)
HBA1C MFR BLD: 6.5 %
HCT VFR BLD AUTO: 31.8 % (ref 36.5–49.3)
HGB BLD-MCNC: 9.9 G/DL (ref 12–17)
MCH RBC QN AUTO: 29.9 PG (ref 26.8–34.3)
MCHC RBC AUTO-ENTMCNC: 31.1 G/DL (ref 31.4–37.4)
MCV RBC AUTO: 96 FL (ref 82–98)
PLATELET # BLD AUTO: 162 THOUSANDS/UL (ref 149–390)
PMV BLD AUTO: 10.9 FL (ref 8.9–12.7)
POTASSIUM SERPL-SCNC: 4.1 MMOL/L (ref 3.5–5.3)
RBC # BLD AUTO: 3.31 MILLION/UL (ref 3.88–5.62)
SODIUM SERPL-SCNC: 139 MMOL/L (ref 135–147)
WBC # BLD AUTO: 5.37 THOUSAND/UL (ref 4.31–10.16)

## 2025-05-01 PROCEDURE — 80048 BASIC METABOLIC PNL TOTAL CA: CPT

## 2025-05-01 PROCEDURE — 36415 COLL VENOUS BLD VENIPUNCTURE: CPT

## 2025-05-01 PROCEDURE — 85027 COMPLETE CBC AUTOMATED: CPT

## 2025-05-01 PROCEDURE — 83036 HEMOGLOBIN GLYCOSYLATED A1C: CPT

## 2025-05-07 DIAGNOSIS — K86.81 EXOCRINE PANCREATIC INSUFFICIENCY: ICD-10-CM

## 2025-05-07 PROBLEM — E83.9 CHRONIC KIDNEY DISEASE-MINERAL AND BONE DISORDER: Status: ACTIVE | Noted: 2025-05-07

## 2025-05-07 PROBLEM — N17.9 AKI (ACUTE KIDNEY INJURY) (HCC): Status: RESOLVED | Noted: 2024-12-29 | Resolved: 2025-05-07

## 2025-05-07 PROBLEM — N18.9 CHRONIC KIDNEY DISEASE-MINERAL AND BONE DISORDER: Status: ACTIVE | Noted: 2025-05-07

## 2025-05-07 PROBLEM — E87.70 VOLUME OVERLOAD: Status: RESOLVED | Noted: 2025-01-06 | Resolved: 2025-05-07

## 2025-05-07 PROBLEM — M89.9 CHRONIC KIDNEY DISEASE-MINERAL AND BONE DISORDER: Status: ACTIVE | Noted: 2025-05-07

## 2025-05-07 NOTE — TELEPHONE ENCOUNTER
Patient states that the medication is prescribed by Amarjit Summers MD. Patient has an appointment 05/29/25  Reason for call:   [x] Refill   [] Prior Auth  [] Other:     Office:   [] PCP/Provider -     [x] Specialty/Provider - Amarjit Summers MD    Medication: pancrelipase, Lip-Prot-Amyl, (Creon) 24,000 units    Dose/Frequency: Take 24,000 units of lipase by mouth 3 (three) times a day with meals    Quantity: 300    Pharmacy: [facility] [address] [facility]   Does the patient have enough for 3 days?   [] Yes   [] No - Send as HP to POD    Mail Away Pharmacy   Does the patient have enough for 10 days?   [x] Yes   [] No - Send as HP to POD

## 2025-05-08 ENCOUNTER — OFFICE VISIT (OUTPATIENT)
Dept: NEPHROLOGY | Facility: CLINIC | Age: 80
End: 2025-05-08
Payer: MEDICARE

## 2025-05-08 VITALS
DIASTOLIC BLOOD PRESSURE: 50 MMHG | OXYGEN SATURATION: 98 % | HEART RATE: 50 BPM | SYSTOLIC BLOOD PRESSURE: 128 MMHG | BODY MASS INDEX: 24.2 KG/M2 | HEIGHT: 70 IN | WEIGHT: 169 LBS

## 2025-05-08 DIAGNOSIS — D63.1 ANEMIA DUE TO STAGE 4 CHRONIC KIDNEY DISEASE  (HCC): ICD-10-CM

## 2025-05-08 DIAGNOSIS — R80.1 PERSISTENT PROTEINURIA, UNSPECIFIED: ICD-10-CM

## 2025-05-08 DIAGNOSIS — N18.4 ANEMIA DUE TO STAGE 4 CHRONIC KIDNEY DISEASE  (HCC): ICD-10-CM

## 2025-05-08 DIAGNOSIS — N18.4 HYPERTENSIVE KIDNEY DISEASE WITH STAGE 4 CHRONIC KIDNEY DISEASE (HCC): ICD-10-CM

## 2025-05-08 DIAGNOSIS — N18.4 CKD (CHRONIC KIDNEY DISEASE) STAGE 4, GFR 15-29 ML/MIN (HCC): ICD-10-CM

## 2025-05-08 DIAGNOSIS — I12.9 HYPERTENSIVE KIDNEY DISEASE WITH STAGE 4 CHRONIC KIDNEY DISEASE (HCC): ICD-10-CM

## 2025-05-08 DIAGNOSIS — E11.22 TYPE 2 DIABETES MELLITUS WITH STAGE 4 CHRONIC KIDNEY DISEASE, WITHOUT LONG-TERM CURRENT USE OF INSULIN (HCC): ICD-10-CM

## 2025-05-08 DIAGNOSIS — N28.1 RENAL CYST, LEFT: ICD-10-CM

## 2025-05-08 DIAGNOSIS — C34.32 PRIMARY NON-SMALL CELL CARCINOMA OF LOWER LOBE OF LEFT LUNG (HCC): ICD-10-CM

## 2025-05-08 DIAGNOSIS — N20.0 NEPHROLITHIASIS: ICD-10-CM

## 2025-05-08 DIAGNOSIS — N18.9 CHRONIC KIDNEY DISEASE-MINERAL AND BONE DISORDER: ICD-10-CM

## 2025-05-08 DIAGNOSIS — M89.9 CHRONIC KIDNEY DISEASE-MINERAL AND BONE DISORDER: ICD-10-CM

## 2025-05-08 DIAGNOSIS — K55.9 MESENTERIC ISCHEMIA (HCC): ICD-10-CM

## 2025-05-08 DIAGNOSIS — N25.81 SECONDARY HYPERPARATHYROIDISM OF RENAL ORIGIN (HCC): ICD-10-CM

## 2025-05-08 DIAGNOSIS — E79.0 HYPERURICEMIA: ICD-10-CM

## 2025-05-08 DIAGNOSIS — N18.4 ACUTE RENAL FAILURE SUPERIMPOSED ON STAGE 4 CHRONIC KIDNEY DISEASE, UNSPECIFIED ACUTE RENAL FAILURE TYPE (HCC): Primary | ICD-10-CM

## 2025-05-08 DIAGNOSIS — I50.32 CHRONIC DIASTOLIC CHF (CONGESTIVE HEART FAILURE) (HCC): ICD-10-CM

## 2025-05-08 DIAGNOSIS — N17.9 ACUTE RENAL FAILURE SUPERIMPOSED ON STAGE 4 CHRONIC KIDNEY DISEASE, UNSPECIFIED ACUTE RENAL FAILURE TYPE (HCC): Primary | ICD-10-CM

## 2025-05-08 DIAGNOSIS — E83.9 CHRONIC KIDNEY DISEASE-MINERAL AND BONE DISORDER: ICD-10-CM

## 2025-05-08 DIAGNOSIS — N18.4 TYPE 2 DIABETES MELLITUS WITH STAGE 4 CHRONIC KIDNEY DISEASE, WITHOUT LONG-TERM CURRENT USE OF INSULIN (HCC): ICD-10-CM

## 2025-05-08 PROCEDURE — G2211 COMPLEX E/M VISIT ADD ON: HCPCS | Performed by: PHYSICIAN ASSISTANT

## 2025-05-08 PROCEDURE — 99214 OFFICE O/P EST MOD 30 MIN: CPT | Performed by: PHYSICIAN ASSISTANT

## 2025-05-08 NOTE — ASSESSMENT & PLAN NOTE
Calcium and magnesium stable  Phosphorus 4.3, slightly elevated.  Recommend low phosphorus diet  PTH as above  Vitamin D 25 level 32.8 in Sept '24, at goal  continue to monitor  Orders:    Magnesium; Future    Renal function panel; Future    PTH, intact; Future    Vitamin D 25 hydroxy; Future

## 2025-05-08 NOTE — ASSESSMENT & PLAN NOTE
Lab Results   Component Value Date    HGBA1C 6.5 (H) 05/01/2025   stable  continue to optimize glycemic control  management per primary team  Orders:    Albumin / creatinine urine ratio; Future    Renal function panel; Future

## 2025-05-08 NOTE — PATIENT INSTRUCTIONS
Your kidney function remains stable.  Most recent creatinine 2.68.  We will continue routine surveillance as outlined below.  Avoid all NSAIDs to include ibuprofen, Motrin, Aleve, Advil, Naproxen, Celebrex, Indomethacin, Toradol.  Stay well hydrated.   Continue all prescribed medications.  Call if blood pressure consistently more than 140/90 or less than 110/50s.  High and low blood pressures may affect your kidney function.  Recommend low salt diet (2 gm sodium diet).  Please let us know if you are scheduled for any studies with IV contrast (ex: CT scan, arteriogram or cardiac catheterization)   Follow up in 3-4 months with Dr. Muñoz with repeat labs prior to appointment.  Please contact the office with new symptoms or concerns.

## 2025-05-08 NOTE — ASSESSMENT & PLAN NOTE
BP normotensive, at goal  volume status euvolemic  Current medications: metoprolol 12.5 mg BID, amlodipine 10 g daily, hydralazine 75 mg BID, torsemide 10 mg daily  Lisinopril held since SHOAIB in December '24  Changes: none.  Continue to hold lisinopril  Avoid hypotension or large fluctuations in blood pressure.  Avoid NSAIDs  low sodium (2 gm) diet  continue to monitor BP at home  Orders:    Albumin / creatinine urine ratio; Future    Renal function panel; Future

## 2025-05-08 NOTE — ASSESSMENT & PLAN NOTE
Wt Readings from Last 3 Encounters:   05/08/25 76.7 kg (169 lb)   04/29/25 76 kg (167 lb 9.6 oz)   04/04/25 78 kg (172 lb)   Compensated  echo 1/8/2025:  EF 65% with G1DD, IVC normal size  home diuretics: torsemide 10 mg daily  on beta blockers  fluid restriction, 2 gm low sodium diet, daily weights  management per Cardiology

## 2025-05-08 NOTE — ASSESSMENT & PLAN NOTE
Asymptomatic with no recent stone events  CT 12/24/2024 with stable left lower pole nonobstructing intrarenal calculi without hydronephrosis or urinary tract stones.  Continue to monitor

## 2025-05-08 NOTE — ASSESSMENT & PLAN NOTE
Etiology:  Due to hypertensive nephrosclerosis, age-related nephron loss, episodes of SHOAIB  Baseline creatinine previously 1.8-2.2 but suspect now in the mid 2's  Follows with Dr. Muñoz  UPCr 0.3 g, at goal  Kidney Smart/CKD education:  recommend but patient refuses  Orders:    Albumin / creatinine urine ratio; Future    CBC; Future    Magnesium; Future    Renal function panel; Future    PTH, intact; Future    Vitamin D 25 hydroxy; Future

## 2025-05-08 NOTE — PROGRESS NOTES
Name: Jay Roach Jr.      : 1945      MRN: [id number]  Encounter Provider: Margaret Damian PA-C  Encounter Date: 2025   Encounter department: [facility] [place]  :  Assessment & Plan  Acute renal failure superimposed on stage 4 chronic kidney disease, unspecified acute renal failure type (HCC)  Etiology: suspected due to FELIPE initially +ischemic injury due to hemodynamic perturbation intraoperatively with autoregulatory dysfunction with lisinopril progressing to ATN.  Admission creatinine 2.08 on 2024.  Peak creatinine 2.78.     Discharge creatinine 2.47 on 2025 but creatinine subsequently bumped to 2.91 as outpatient on 2025, now trending down with hydration  baseline creatinine previously 1.8-2.2  most recent creatinine 2.68, stable.  Suspect may represent new baseline  electrolyte and acid-base stable  Continue to hold lisinopril and Jardiance  Avoid nephrotoxins, NSAIDs, IV contrast if possible  Avoid hypotension  follow-up in office in 3 months with Dr. Muñoz with repeat blood and urine studies.  Orders:    Renal function panel; Future    Hypertensive kidney disease with stage 4 chronic kidney disease (HCC)  BP normotensive, at goal  volume status euvolemic  Current medications: metoprolol 12.5 mg BID, amlodipine 10 g daily, hydralazine 75 mg BID, torsemide 10 mg daily  Lisinopril held since SHOAIB in   Changes: none.  Continue to hold lisinopril  Avoid hypotension or large fluctuations in blood pressure.  Avoid NSAIDs  low sodium (2 gm) diet  continue to monitor BP at home  Orders:    Albumin / creatinine urine ratio; Future    Renal function panel; Future    CKD (chronic kidney disease) stage 4, GFR 15-29 ml/min ([facility])  Etiology:  Due to hypertensive nephrosclerosis, age-related nephron loss, episodes of SHOAIB  Baseline creatinine previously 1.8-2.2 but suspect now in the mid 2's  Follows with Dr. Muñoz  UPCr 0.3 g, at goal  Kidney Smart/CKD  education:  recommend but patient refuses  Orders:    Albumin / creatinine urine ratio; Future    CBC; Future    Magnesium; Future    Renal function panel; Future    PTH, intact; Future    Vitamin D 25 hydroxy; Future    Persistent proteinuria, unspecified  UPCR 0.3 g, at goal  Jardiance and lisinopril held since SHOAIB in January '25  Optimize glycemic and BP control  continue to monitor.  Repeat urine studies prior to next appointment.  Orders:    Albumin / creatinine urine ratio; Future    Renal function panel; Future    Anemia due to stage 4 chronic kidney disease  (HCC)  Hgb 9.9, near goal. Goal Hgb >10.0  Iron studies 2/1/2025: iron saturation 17%, ferritin 40  Continue oral iron every other day  check CBC prior to next appointment  continue to monitor  Orders:    CBC; Future    Secondary hyperparathyroidism of renal origin ([facility])  PTH 82.3, acceptable  On calcitriol 0.25 mcg daily  Calcium stable  Continue to monitor  Orders:    Renal function panel; Future    PTH, intact; Future    Vitamin D 25 hydroxy; Future    Chronic kidney disease-mineral and bone disorder  Calcium and magnesium stable  Phosphorus 4.3, slightly elevated.  Recommend low phosphorus diet  PTH as above  Vitamin D 25 level 32.8 in Sept '24, at goal  continue to monitor  Orders:    Magnesium; Future    Renal function panel; Future    PTH, intact; Future    Vitamin D 25 hydroxy; Future    Mesenteric ischemia ([facility])  S/p ex lap, lysis of adhesion and open SMA stenting 12/24/2024  Denies postprandial pain, food fear or weight loss  Management per vascular surgery, Dr Higuera       Chronic diastolic CHF (congestive heart failure) ([facility])  Wt Readings from Last 3 Encounters:   05/08/25 76.7 kg (169 lb)   04/29/25 76 kg (167 lb 9.6 oz)   04/04/25 78 kg (172 lb)   Compensated  echo 1/8/2025:  EF 65% with G1DD, IVC normal size  home diuretics: torsemide 10 mg daily  on beta blockers  fluid restriction, 2 gm low sodium diet, daily weights  management per  Cardiology       Type 2 diabetes mellitus with stage 4 chronic kidney disease, without long-term current use of insulin (HCC)    Lab Results   Component Value Date    HGBA1C 6.5 (H) 05/01/2025   stable  continue to optimize glycemic control  management per primary team  Orders:    Albumin / creatinine urine ratio; Future    Renal function panel; Future    Primary non-small cell carcinoma of lower lobe of left lung (HCC)  Hx Stage 1A Squamous cell carcinoma LLL diagnosed April '24  Nonsurgical candidate  S/p definitive SBRT completed 5/30/2024  CT March '25 with interval enlargement of anterior LLL nodular opacity.  Concern for possible recurrence vs radiation evolution of imaging.  Following with radiation oncology. Seen 4/4/2025 and plan for 3 month f/u CTC.       Hyperuricemia  Uric acid stable  On allopurinol 300  mg daily  Low purine diet       Renal cyst, left  Simple parapelvic cyst with no need for further monitoring       Nephrolithiasis  Asymptomatic with no recent stone events  CT 12/24/2024 with stable left lower pole nonobstructing intrarenal calculi without hydronephrosis or urinary tract stones.  Continue to monitor         Plan Summary:  -renal function stable, likely new baseline in mid 2's since SHOAIB in December and additional episode of volume overload  -continue to hold lisinopril and Jardiance given ongoing fluctuations in renal function.  -Follow up with Dr. Muñoz in 3-4 months with repeat blood and urine studies.  Please call the office with any questions or concerns.        Reason for Visit: Hospital Follow-up    HPI: Jay Roach JrAgustín is a 80 y.o. male with CKD 4, hx SHOAIB Dec '24 in setting of mesenteric ischemia with CTA, ex lap/lysis of adhesions/SMA stenting, ACEi and diuretics, HTN, CHF, PAD, s/p L CFEA and stenting, HLD, GERD, CAD, TAVR, hx lung cancer, s/p XRT, hx GI bleed due to gastric ulcers, s/p appendectomy with right hemicolectomy, bilateral carotid stenosis who presents for  hospital follow up from January '25 of SHOAIB on CKD. Patient followed by Dr. Muñoz, last seen in office 11/6/2024.  Pt hospitalized at [facility] 12/24/2024-1/3/2025 due to mesenteric ischemia and SHOAIB and then subsequently 1/6/2025-1/11/25 with volume overload.  Peak creatinine 2.78 on 12/29/2024.Discharge creatinine was 2.47 on 1/11/2025 but worsened to 2.71 and subsequently 2.91 on 2/1/2025.  Creatinine has remained in the mid 2s Most recent creatinine 2.68, eGFR 21, trending down with oral hydration.  He remains on torsemide and is not taking Jardiance.  Patient denies NSAID use.  Patient denies nausea, vomiting, diarrhea, dyspnea, orthopnea, edema, hematuria or foamy urine.      ROS: A complete review of systems was performed and was negative unless otherwise noted in the history of present illness.    Allergies:   Patient has no known allergies.    Medications:     Current Outpatient Medications:     allopurinol (ZYLOPRIM) 300 mg tablet, Take 1 tablet (300 mg total) by mouth daily, Disp: 90 tablet, Rfl: 1    amLODIPine (NORVASC) 10 mg tablet, Take 1 tablet (10 mg total) by mouth daily, Disp: 90 tablet, Rfl: 1    atorvastatin (LIPITOR) 80 mg tablet, Take 1 tablet (80 mg total) by mouth daily at bedtime, Disp: 90 tablet, Rfl: 1    calcitriol (ROCALTROL) 0.25 mcg capsule, TAKE 1 CAPSULE DAILY, Disp: 90 capsule, Rfl: 1    clopidogrel (PLAVIX) 75 mg tablet, TAKE 1 TABLET DAILY, Disp: 90 tablet, Rfl: 3    ferrous sulfate 325 (65 Fe) mg tablet, Take 325 mg by mouth every other day, Disp: , Rfl:     hydrALAZINE (APRESOLINE) 25 mg tablet, Take 3 tablets (75 mg total) by mouth 2 (two) times a day, Disp: 540 tablet, Rfl: 3    magnesium Oxide (MAG-OX) 400 mg TABS, Take 1 tablet (400 mg total) by mouth 2 (two) times a day, Disp: 180 tablet, Rfl: 1    pancrelipase, Lip-Prot-Amyl, (Creon) 24,000 units, Take 24,000 units of lipase by mouth 3 (three) times a day with meals, Disp: 300 capsule, Rfl: 1    pantoprazole (PROTONIX) 40 mg  tablet, Take 1 tablet (40 mg total) by mouth 2 (two) times a day, Disp: 180 tablet, Rfl: 1    potassium chloride (Klor-Con M20) 20 mEq tablet, Take 1 tablet (20 mEq total) by mouth daily, Disp: 90 tablet, Rfl: 1    torsemide (DEMADEX) 10 mg tablet, Take 1 tablet (10 mg total) by mouth daily, Disp: , Rfl:     glimepiride (AMARYL) 1 mg tablet, Take 1 tablet (1 mg total) by mouth daily with breakfast, Disp: 90 tablet, Rfl: 1    metoprolol tartrate (LOPRESSOR) 25 mg tablet, Take 0.5 tablets (12.5 mg total) by mouth every 12 (twelve) hours, Disp: 90 tablet, Rfl: 1    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 ([facility]) 09/10/2021    Atherosclerosis of native arteries of extremities with intermittent claudication, right leg ([facility]) 01/06/2025    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 ([facility]) 02/24/2023    Basal cell carcinoma     right cheek    CAD (coronary artery disease)     Carotid stenosis, asymptomatic, bilateral     Chronic kidney disease     Colon polyp     Dependence on respirator (ventilator) status ([facility]) 04/07/2023    Diabetes ([facility])     type 2, non-insulin dependent    Diabetes mellitus ([facility])     GERD (gastroesophageal reflux disease)     History of nephrolithiasis     Hyperlipidemia     Hypertension     Left foot pain 11/30/2022    Lung cancer ([facility])     PAD (peripheral artery disease) ([facility])     Portal Venous Gas 12/24/2024    Severe aortic stenosis     Squamous cell skin cancer 11/22/2022    left superior helix     Past Surgical History:   Procedure Laterality Date    AORTA - SUPERIOR MESENTERIC ARTERY BYPASS GRAFT N/A 12/24/2024    Procedure: OPEN MESENTERIC STENTING;  Surgeon: Eagle Higuera MD;  [facility];  Service: Vascular    APPENDECTOMY      ARTERIOGRAM N/A 12/24/2024    Procedure: ARTERIOGRAM;  Surgeon: Eagle Higuera MD;  [facility];  Service:  Vascular    CARDIAC CATHETERIZATION N/A 05/05/2022    Procedure: CARDIAC RHC/LHC;  Surgeon: Arvin Sanchez DO;  [facility];  Service: Cardiology    CARDIAC CATHETERIZATION N/A 05/05/2022    Procedure: Cardiac Coronary Angiogram;  Surgeon: Arvin Sanchez DO;  [facility];  Service: Cardiology    CARDIAC CATHETERIZATION N/A 05/24/2022    Procedure: Cardiac pci;  Surgeon: Damien Jeter MD;  [facility];  Service: Cardiology    CARDIAC CATHETERIZATION N/A 06/14/2022    Procedure: CARDIAC TAVR;  Surgeon: Nahum Vaz MD;  [facility];  Service: Cardiology    COLECTOMY      COLONOSCOPY  2013    CORONARY ARTERY BYPASS GRAFT  2013    X 2    FEMORAL ARTERY - POPLITEAL ARTERY BYPASS GRAFT      HEMORRHOID SURGERY      IR AORTAGRAM WITH RUN-OFF  11/19/2018    IR AORTAGRAM WITH RUN-OFF  03/02/2023    IR BIOPSY LUNG  4/17/2024    IR LOWER EXTREMITY ANGIOGRAM  03/23/2023    IR MESENTERIC/VISCERAL ANGIOGRAM  12/24/2024    MOHS SURGERY Left 01/11/2023    SCC left superior helix-Dr Guy    ID LAPS ABD PRTM&OMENTUM DX W/WO SPEC BR/WA SPX N/A 12/24/2024    Procedure: EXPLORATORY LAPAROTOMY, LYSIS OF ADHESIONS, ABDOMEN CLOSURE;  Surgeon: Alvin Flores MD;  [facility];  Service: General    ID SLCTV CATHJ 3RD+ ORD SLCTV ABDL PEL/LXTR BRNCH Left 08/12/2016    Procedure: LEFT FEMORAL ARTERIOGRAM; BALLOON ANGIOPLASTY; SFA  AND FEMORAL AT VEIN GRAFT;  Surgeon: Nicholas Urena MD;  [facility];  Service: Vascular    ID TEAEC W/WO PATCH GRAFT COMMON FEMORAL Right 03/23/2023    Procedure: Common femoral endarterectomy&antegrade intervention of SFA, popliteal artery w/ shockwave;  Surgeon: Eagle Higuera MD;  Location: [facility];  Service: Vascular    ID TRANSCATHETER TRANSAPICAL REPLACEMT AORTIC VALVE N/A 06/14/2022    Procedure: REPLACEMENT AORTIC VALVE TRANSCATHETER (TAVR) TRANSAPICAL 29MM IRVIN KYLER S3 ULTRA VALVE(ACCESS ON  "LEFT) ELANA;  Surgeon: Amarjit Gordon DO;  [facility]: [facility];  Service: Cardiac Surgery    SKIN CANCER EXCISION      TONSILLECTOMY AND ADENOIDECTOMY      UPPER GASTROINTESTINAL ENDOSCOPY       Family History   Problem Relation Age of Onset    Heart attack Father     Other Sister         bypass and vlave replacement    Stroke Paternal Uncle     Arrhythmia Neg Hx     Asthma Neg Hx     Clotting disorder Neg Hx     Fainting Neg Hx     Anuerysm Neg Hx     Hypertension Neg Hx         unsure     Hyperlipidemia Neg Hx     Heart failure Neg Hx       reports that he has been smoking cigarettes. He has a 62.5 pack-year smoking history. He has been exposed to tobacco smoke. He has never used smokeless tobacco. He reports that he does not currently use alcohol. He reports that he does not use drugs.    Physical Exam:   Vitals:    05/08/25 1439   BP: 128/50   BP Location: Left arm   Patient Position: Sitting   Cuff Size: Adult   Pulse: (!) 50   SpO2: 98%   Weight: 76.7 kg (169 lb)   Height: 5' 10\" (1.778 m)     Body mass index is 24.25 kg/m².    General:  Awake, alert, appears comfortable and in no acute distress.  Nontoxic.  Skin:  No rash, warm, good skin turgor   Eyes:  PERRL, EOMI, sclerae nonicteric.  no periorbital edema   ENT:  Moist mucous membranes  Neck:  Trachea midline, symmetric.  No JVD.  No carotid bruits.  Chest:  Clear to auscultation bilaterally without wheezes, crackles or rhonchi  CVS:  Regular rate and rhythm without murmur, gallop or rub.  S1 and S2 identified and normal.  No S3, S4.   Abdomen:  Soft, nontender, nondistended without masses.  Normal bowel sounds x 4 quadrants.  No bruit.  Extremities:  Warm, pink, motor and sensory intact and well perfused.  No cyanosis, pallor.  No BLE edema.  Neuro:  Awake, alert, oriented x3.  Grossly intact  Psych:  Appropriate affect.  Mentating appropriately.  Normal mental status exam      Procedure:  No results found. However, due to the size of the patient " "record, not all encounters were searched. Please check Results Review for a complete set of results.    Lab Results   Component Value Date    GLUCOSE 123 12/24/2024    CALCIUM 9.1 05/01/2025     11/22/2015    K 4.1 05/01/2025    CO2 25 05/01/2025     05/01/2025    BUN 53 (H) 05/01/2025    CREATININE 2.68 (H) 05/01/2025             Invalid input(s): \"ALBUMIN\"      EMR, including Epic, Care Everywhere and outside scanned documents reviewed.  I have personally reviewed the blood work as stated above and in my note..  I have personally reviewed PCP, consultants and prior nephrology notes.    I have spent a total time of 38 minutes in caring for this patient on the day of the visit/encounter including Diagnostic results, Prognosis, Risks and benefits of tx options, Instructions for management, Patient and family education, Importance of tx compliance, Risk factor reductions, Impressions, Counseling / Coordination of care, Documenting in the medical record, Reviewing/placing orders in the medical record (including tests, medications, and/or procedures), and Obtaining or reviewing history  .    "

## 2025-05-08 NOTE — ASSESSMENT & PLAN NOTE
UPCR 0.3 g, at goal  Jardiance and lisinopril held since SHOAIB in January '25  Optimize glycemic and BP control  continue to monitor.  Repeat urine studies prior to next appointment.  Orders:    Albumin / creatinine urine ratio; Future    Renal function panel; Future

## 2025-05-08 NOTE — ASSESSMENT & PLAN NOTE
S/p ex lap, lysis of adhesion and open SMA stenting 12/24/2024  Denies postprandial pain, food fear or weight loss  Management per vascular surgery, Dr Higuera

## 2025-05-08 NOTE — ASSESSMENT & PLAN NOTE
Hx Stage 1A Squamous cell carcinoma LLL diagnosed April '24  Nonsurgical candidate  S/p definitive SBRT completed 5/30/2024  CT March '25 with interval enlargement of anterior LLL nodular opacity.  Concern for possible recurrence vs radiation evolution of imaging.  Following with radiation oncology. Seen 4/4/2025 and plan for 3 month f/u CTC.

## 2025-05-08 NOTE — ASSESSMENT & PLAN NOTE
Etiology: suspected due to FELIPE initially +ischemic injury due to hemodynamic perturbation intraoperatively with autoregulatory dysfunction with lisinopril progressing to ATN.  Admission creatinine 2.08 on 12/24/2024.  Peak creatinine 2.78.     Discharge creatinine 2.47 on 1/11/2025 but creatinine subsequently bumped to 2.91 as outpatient on 2/1/2025, now trending down with hydration  baseline creatinine previously 1.8-2.2  most recent creatinine 2.68, stable.  Suspect may represent new baseline  electrolyte and acid-base stable  Continue to hold lisinopril and Jardiance  Avoid nephrotoxins, NSAIDs, IV contrast if possible  Avoid hypotension  follow-up in office in 3 months with Dr. Muñoz with repeat blood and urine studies.  Orders:    Renal function panel; Future

## 2025-05-08 NOTE — ASSESSMENT & PLAN NOTE
Hgb 9.9, near goal. Goal Hgb >10.0  Iron studies 2/1/2025: iron saturation 17%, ferritin 40  Continue oral iron every other day  check CBC prior to next appointment  continue to monitor  Orders:    CBC; Future

## 2025-05-08 NOTE — ASSESSMENT & PLAN NOTE
PTH 82.3, acceptable  On calcitriol 0.25 mcg daily  Calcium stable  Continue to monitor  Orders:    Renal function panel; Future    PTH, intact; Future    Vitamin D 25 hydroxy; Future

## 2025-05-26 DIAGNOSIS — E78.5 HYPERLIPIDEMIA, UNSPECIFIED HYPERLIPIDEMIA TYPE: ICD-10-CM

## 2025-05-27 RX ORDER — ATORVASTATIN CALCIUM 80 MG/1
80 TABLET, FILM COATED ORAL
Qty: 90 TABLET | Refills: 3 | Status: SHIPPED | OUTPATIENT
Start: 2025-05-27

## 2025-05-29 ENCOUNTER — OFFICE VISIT (OUTPATIENT)
Dept: GASTROENTEROLOGY | Facility: CLINIC | Age: 80
End: 2025-05-29
Payer: MEDICARE

## 2025-05-29 VITALS
HEART RATE: 48 BPM | DIASTOLIC BLOOD PRESSURE: 41 MMHG | BODY MASS INDEX: 24.34 KG/M2 | HEIGHT: 70 IN | SYSTOLIC BLOOD PRESSURE: 135 MMHG | WEIGHT: 170 LBS

## 2025-05-29 DIAGNOSIS — K86.81 EXOCRINE PANCREATIC INSUFFICIENCY: Primary | ICD-10-CM

## 2025-05-29 DIAGNOSIS — K25.4 GASTROINTESTINAL HEMORRHAGE ASSOCIATED WITH GASTRIC ULCER: ICD-10-CM

## 2025-05-29 DIAGNOSIS — K86.1 OTHER CHRONIC PANCREATITIS (HCC): ICD-10-CM

## 2025-05-29 DIAGNOSIS — Z12.11 COLON CANCER SCREENING: ICD-10-CM

## 2025-05-29 PROCEDURE — 99213 OFFICE O/P EST LOW 20 MIN: CPT | Performed by: INTERNAL MEDICINE

## 2025-05-29 NOTE — ASSESSMENT & PLAN NOTE
No suggestion of ongoing bleeding.  Will hold on repeat EGD unless symptomatic or further drop in hemoglobin without explanation continue PPI.  Continue to hold aspirin

## 2025-05-29 NOTE — PROGRESS NOTES
"Name: Jay Roach Jr.      : 1945      MRN: [id number]  Encounter Provider: Amarjit Summers MD  Encounter Date: 2025   Encounter department: [facility]STR[facility]OLOG[facility] [address]  :  Assessment & Plan  Exocrine pancreatic insufficiency  Continue Creon.  Will check fat-soluble vitamin levels    Orders:    Vitamin A; Future    Vitamin E; Future    Vitamin K; Future    Other chronic pancreatitis (HCC)         Gastrointestinal hemorrhage associated with gastric ulcer  No suggestion of ongoing bleeding.  Will hold on repeat EGD unless symptomatic or further drop in hemoglobin without explanation continue PPI.  Continue to hold aspirin       Colon cancer screening  No indication for further surveillance given age and comorbidities           History of Present Illness   Jay Roach Jr. is a 80 y.o. male with multiple comorbidities who presents in follow-up of a number of issues as above.  Has a history of multiple upper GI bleeds from gastric ulcers while on dual antiplatelet therapy requiring endoscopic treatment with clipping.  Was taken off aspirin but remains on clopidogrel as well as pantoprazole.  Also has a history of chronic pancreatitis with exocrine pancreatic insufficiency and steatorrhea on Creon with symptomatic control.  Since his last visit to this office he experienced abdominal pain and was diagnosed with acute mesenteric ischemia.  Was hospitalized for an extended period and underwent stenting of the superior mesenteric artery on .  Did have drop in his hemoglobin around that time though this has improved with transfusion and continued oral iron supplementation      HPI    Review of Systems A complete review of systems is negative other than that noted above in the HPI.      Current Medications[1]  Objective   BP (!) 135/41 (Patient Position: Sitting, Cuff Size: Standard)   Pulse (!) 48   Ht 5' 10\" (1.778 m)   Wt 77.1 kg (170 lb)   BMI " 24.39 kg/m²     Physical Exam  Vitals and nursing note reviewed.   Constitutional:       General: He is not in acute distress.     Appearance: He is not toxic-appearing.   HENT:      Head: Normocephalic and atraumatic.      Mouth/Throat:      Mouth: Mucous membranes are moist.     Eyes:      General: No scleral icterus.     Pupils: Pupils are equal, round, and reactive to light.       Cardiovascular:      Rate and Rhythm: Normal rate.   Pulmonary:      Effort: Pulmonary effort is normal. No respiratory distress.   Abdominal:      General: There is no distension.      Palpations: Abdomen is soft.      Tenderness: There is no abdominal tenderness. There is no guarding or rebound.     Musculoskeletal:         General: Normal range of motion.      Cervical back: Normal range of motion and neck supple.      Right lower leg: No edema.      Left lower leg: No edema.     Skin:     General: Skin is warm and dry.     Neurological:      General: No focal deficit present.      Mental Status: He is alert and oriented to person, place, and time.     Psychiatric:         Mood and Affect: Mood normal.         Behavior: Behavior normal.            Lab Results: I personally reviewed relevant lab results.       Results for orders placed during the hospital encounter of 01/10/24    EGD    Narrative  Table formatting from the original result was not included.  [facility]  [address] [facility] [address]  [phone]      DATE OF SERVICE:  1/11/24    PHYSICIAN(S):  Attending:  Karl Siddiqui MD    Fellow:  No Staff Documented      INDICATION:  Acute blood loss anemia, Gastrointestinal hemorrhage associated with gastric ulcer    POST-OP DIAGNOSIS:  See the impression below.    PREPROCEDURE:  Informed consent was obtained for the procedure, including sedation.  Risks of perforation, hemorrhage, adverse drug reaction and aspiration were discussed. The patient was placed in the left lateral  decubitus position.    Patient was explained about the risks and benefits of the procedure. Risks including but not limited to bleeding, infection, and perforation were explained in detail. Also explained about less than 100% sensitivity with the exam and other alternatives.    PROCEDURE: EGD    DETAILS OF PROCEDURE:  Patient was taken to the procedure room where a time out was performed to confirm correct patient and correct procedure. The patient underwent monitored anesthesia care, which was administered by an anesthesia professional. The patient's blood pressure, heart rate, level of consciousness, respirations and oxygen were monitored throughout the procedure. The scope was advanced to the second part of the duodenum. Retroflexion was performed in the fundus. The patient experienced no blood loss. The procedure was not difficult. The patient tolerated the procedure well. There were no apparent adverse events.    ANESTHESIA INFORMATION:  ASA: IV  Anesthesia Type: Anesthesia type not filed in the log.    MEDICATIONS:  No administrations occurring from 1356 to 1414 on 01/11/24      FINDINGS:  Regular Z-line 40 cm from the incisors  Previously placed clips were seen in the fundus, there were  4 clip still in place.  No active/recent bleeding noted from prior ulcer site.  Multiple small, superficial, linear ulcers in the greater curve of the stomach with flat pigmented spot (Mikey IIC); placed 5 clips successfully (clips are MRI conditional); hemostasis achieved; induced coagulation and hemostasis achieved with 3 applications of with bipolar cautery  The duodenal bulb, 1st part of the duodenum and 2nd part of the duodenum appeared normal.      SPECIMENS:  * No specimens in log *        Impression  Multiple ulcers in the greater curve of the stomach with flat pigmented spot (Mikey IIC); placed 5 clips successfully; hemostasis achieved; induced coagulation and hemostasis achieved with bipolar cautery  The  duodenal bulb, 1st part of the duodenum and 2nd part of the duodenum appeared normal.      RECOMMENDATION:  There is no recommended follow-up for this procedure.    Recommend IV Protonix 40 mg twice daily.  CLD for now.  Hold anticoagulation if possible.  Discuss with cardiology regarding need for dual antiplatelet therapy given recurrent GI bleeds.  Monitor H&H and transfuse if needed.                Karl Siddiqui MD             [1]   Current Outpatient Medications   Medication Sig Dispense Refill    allopurinol (ZYLOPRIM) 300 mg tablet Take 1 tablet (300 mg total) by mouth daily 90 tablet 1    amLODIPine (NORVASC) 10 mg tablet Take 1 tablet (10 mg total) by mouth daily 90 tablet 1    atorvastatin (LIPITOR) 80 mg tablet TAKE 1 TABLET DAILY AT BEDTIME 90 tablet 3    calcitriol (ROCALTROL) 0.25 mcg capsule TAKE 1 CAPSULE DAILY 90 capsule 1    clopidogrel (PLAVIX) 75 mg tablet TAKE 1 TABLET DAILY 90 tablet 3    ferrous sulfate 325 (65 Fe) mg tablet Take 325 mg by mouth every other day      glimepiride (AMARYL) 1 mg tablet Take 1 tablet (1 mg total) by mouth daily with breakfast 90 tablet 1    hydrALAZINE (APRESOLINE) 25 mg tablet Take 3 tablets (75 mg total) by mouth 2 (two) times a day 540 tablet 3    magnesium Oxide (MAG-OX) 400 mg TABS Take 1 tablet (400 mg total) by mouth 2 (two) times a day 180 tablet 1    metoprolol tartrate (LOPRESSOR) 25 mg tablet Take 0.5 tablets (12.5 mg total) by mouth every 12 (twelve) hours 90 tablet 1    pancrelipase, Lip-Prot-Amyl, (Creon) 24,000 units Take 24,000 units of lipase by mouth 3 (three) times a day with meals 300 capsule 1    pantoprazole (PROTONIX) 40 mg tablet Take 1 tablet (40 mg total) by mouth 2 (two) times a day 180 tablet 1    potassium chloride (Klor-Con M20) 20 mEq tablet Take 1 tablet (20 mEq total) by mouth daily 90 tablet 1    torsemide (DEMADEX) 10 mg tablet Take 1 tablet (10 mg total) by mouth daily       No current facility-administered medications for  this visit.

## 2025-06-10 DIAGNOSIS — K21.9 GASTROESOPHAGEAL REFLUX DISEASE, UNSPECIFIED WHETHER ESOPHAGITIS PRESENT: ICD-10-CM

## 2025-06-10 RX ORDER — PANTOPRAZOLE SODIUM 40 MG/1
40 TABLET, DELAYED RELEASE ORAL 2 TIMES DAILY
Qty: 180 TABLET | Refills: 1 | Status: SHIPPED | OUTPATIENT
Start: 2025-06-10

## 2025-06-13 ENCOUNTER — HOSPITAL ENCOUNTER (OUTPATIENT)
Dept: VASCULAR ULTRASOUND | Facility: HOSPITAL | Age: 80
Discharge: HOME | End: 2025-06-13
Attending: SURGERY
Payer: MEDICARE

## 2025-06-13 DIAGNOSIS — K55.9 MESENTERIC ISCHEMIA (HCC): ICD-10-CM

## 2025-06-13 DIAGNOSIS — I70.211 ATHEROSCLEROSIS OF NATIVE ARTERIES OF EXTREMITIES WITH INTERMITTENT CLAUDICATION, RIGHT LEG (HCC): Chronic | ICD-10-CM

## 2025-06-13 PROCEDURE — 93975 VASCULAR STUDY: CPT | Performed by: SURGERY

## 2025-06-13 PROCEDURE — 93975 VASCULAR STUDY: CPT

## 2025-06-14 NOTE — TELEPHONE ENCOUNTER
Renal function improving to creatinine 2.78 mg/dl   Says he has been taking torsemide 10 mg since he got home from the hospital.    -He was admitted in January, was found to have mesenteric artery stenosis, mesenteric ischemia underwent exploratory laparotomy with retrograde open SMA stent placement on 12/24.  He developed SHOAIB post contrast.  He was again admitted in January and discharge creatinine after that admission on 1/11 was 2.47.  Worsened to 2.76 and further worsened to 2.91 on 2/1 after which patient was told to increase oral hydration.  Last blood work showed renal function improving to creatinine 2.78, is not taking Jardiance.  Discussed about increasing oral hydration to 60 ounces per day continue current dose of torsemide as he does not have any lower extremity edema, check BMP in a month   Crisis at bedside     Aashish Brandon RN  06/14/25 [id number]

## 2025-06-16 ENCOUNTER — TRANSCRIBE ORDERS (OUTPATIENT)
Dept: VASCULAR SURGERY | Facility: CLINIC | Age: 80
End: 2025-06-16

## 2025-06-16 DIAGNOSIS — K55.9 MESENTERIC ISCHEMIA (HCC): Primary | ICD-10-CM

## 2025-06-26 ENCOUNTER — RA CDI HCC (OUTPATIENT)
Dept: OTHER | Facility: HOSPITAL | Age: 80
End: 2025-06-26

## 2025-06-26 DIAGNOSIS — N18.4 CKD (CHRONIC KIDNEY DISEASE) STAGE 4, GFR 15-29 ML/MIN (HCC): ICD-10-CM

## 2025-06-26 DIAGNOSIS — I50.9 CHF EXACERBATION (HCC): ICD-10-CM

## 2025-06-26 DIAGNOSIS — I50.33 ACUTE ON CHRONIC HEART FAILURE WITH PRESERVED EJECTION FRACTION (HFPEF) (HCC): ICD-10-CM

## 2025-06-26 RX ORDER — TORSEMIDE 10 MG/1
10 TABLET ORAL DAILY
Qty: 90 TABLET | Refills: 3 | Status: SHIPPED | OUTPATIENT
Start: 2025-06-26

## 2025-06-30 ENCOUNTER — OFFICE VISIT (OUTPATIENT)
Dept: FAMILY MEDICINE CLINIC | Facility: CLINIC | Age: 80
End: 2025-06-30
Payer: MEDICARE

## 2025-06-30 VITALS
OXYGEN SATURATION: 96 % | HEART RATE: 47 BPM | TEMPERATURE: 97.2 F | WEIGHT: 171 LBS | BODY MASS INDEX: 24.48 KG/M2 | RESPIRATION RATE: 16 BRPM | SYSTOLIC BLOOD PRESSURE: 120 MMHG | HEIGHT: 70 IN | DIASTOLIC BLOOD PRESSURE: 50 MMHG

## 2025-06-30 DIAGNOSIS — C34.32 PRIMARY NON-SMALL CELL CARCINOMA OF LOWER LOBE OF LEFT LUNG (HCC): ICD-10-CM

## 2025-06-30 DIAGNOSIS — I70.213 ATHEROSCLEROSIS OF NATIVE ARTERIES OF EXTREMITIES WITH INTERMITTENT CLAUDICATION, BILATERAL LEGS (HCC): Chronic | ICD-10-CM

## 2025-06-30 DIAGNOSIS — J43.1 PANLOBULAR EMPHYSEMA (HCC): ICD-10-CM

## 2025-06-30 DIAGNOSIS — N18.4 CKD (CHRONIC KIDNEY DISEASE) STAGE 4, GFR 15-29 ML/MIN (HCC): ICD-10-CM

## 2025-06-30 DIAGNOSIS — N18.4 ACUTE RENAL FAILURE SUPERIMPOSED ON STAGE 4 CHRONIC KIDNEY DISEASE, UNSPECIFIED ACUTE RENAL FAILURE TYPE (HCC): Primary | ICD-10-CM

## 2025-06-30 DIAGNOSIS — K21.9 GASTROESOPHAGEAL REFLUX DISEASE WITHOUT ESOPHAGITIS: ICD-10-CM

## 2025-06-30 DIAGNOSIS — I25.10 CORONARY ARTERY DISEASE INVOLVING NATIVE CORONARY ARTERY OF NATIVE HEART WITHOUT ANGINA PECTORIS: ICD-10-CM

## 2025-06-30 DIAGNOSIS — I10 PRIMARY HYPERTENSION: ICD-10-CM

## 2025-06-30 DIAGNOSIS — E11.22 CONTROLLED TYPE 2 DIABETES MELLITUS WITH STAGE 4 CHRONIC KIDNEY DISEASE, WITHOUT LONG-TERM CURRENT USE OF INSULIN (HCC): ICD-10-CM

## 2025-06-30 DIAGNOSIS — I50.33 ACUTE ON CHRONIC HEART FAILURE WITH PRESERVED EJECTION FRACTION (HFPEF) (HCC): ICD-10-CM

## 2025-06-30 DIAGNOSIS — I50.32 CHRONIC DIASTOLIC CHF (CONGESTIVE HEART FAILURE) (HCC): ICD-10-CM

## 2025-06-30 DIAGNOSIS — I50.9 CHF EXACERBATION (HCC): ICD-10-CM

## 2025-06-30 DIAGNOSIS — N18.4 CONTROLLED TYPE 2 DIABETES MELLITUS WITH STAGE 4 CHRONIC KIDNEY DISEASE, WITHOUT LONG-TERM CURRENT USE OF INSULIN (HCC): ICD-10-CM

## 2025-06-30 DIAGNOSIS — Z95.2 S/P TAVR (TRANSCATHETER AORTIC VALVE REPLACEMENT): ICD-10-CM

## 2025-06-30 DIAGNOSIS — Z95.1 S/P CABG (CORONARY ARTERY BYPASS GRAFT): Chronic | ICD-10-CM

## 2025-06-30 DIAGNOSIS — K55.1 MESENTERIC ARTERY STENOSIS (HCC): ICD-10-CM

## 2025-06-30 DIAGNOSIS — E78.2 MIXED HYPERLIPIDEMIA: ICD-10-CM

## 2025-06-30 DIAGNOSIS — N17.9 ACUTE RENAL FAILURE SUPERIMPOSED ON STAGE 4 CHRONIC KIDNEY DISEASE, UNSPECIFIED ACUTE RENAL FAILURE TYPE (HCC): Primary | ICD-10-CM

## 2025-06-30 PROCEDURE — 99214 OFFICE O/P EST MOD 30 MIN: CPT | Performed by: FAMILY MEDICINE

## 2025-06-30 PROCEDURE — G2211 COMPLEX E/M VISIT ADD ON: HCPCS | Performed by: FAMILY MEDICINE

## 2025-06-30 NOTE — ASSESSMENT & PLAN NOTE
Lab Results   Component Value Date    EGFR 21 05/01/2025    EGFR 20 03/13/2025    EGFR 20 02/08/2025    CREATININE 2.68 (H) 05/01/2025    CREATININE 2.79 (H) 03/13/2025    CREATININE 2.78 (H) 02/08/2025   Pt to avoid NSAIDs and any IV dyes. Patient to follow up eoither with nephrology or  with us for  further  monitoring of  renal function.

## 2025-06-30 NOTE — ASSESSMENT & PLAN NOTE
Wt Readings from Last 3 Encounters:   06/30/25 77.6 kg (171 lb)   05/29/25 77.1 kg (170 lb)   05/08/25 76.7 kg (169 lb)     Patient is stable  and will continue present plan of care and reassess at next routine visit. All questions about this problem from patient were answered today.

## 2025-06-30 NOTE — PROGRESS NOTES
Name: Jay Roach Jr.      : 1945      MRN: [id number]  Encounter Provider: Simón Hadley MD  Encounter Date: 2025   Encounter department: [facility]  :  Assessment & Plan  Acute renal failure superimposed on stage 4 chronic kidney disease, unspecified acute renal failure type (HCC)  Lab Results   Component Value Date    EGFR 21 2025    EGFR 20 2025    EGFR 20 2025    CREATININE 2.68 (H) 2025    CREATININE 2.79 (H) 2025    CREATININE 2.78 (H) 2025   Pt to avoid NSAIDs and any IV dyes. Patient to follow up eoither with nephrology or  with us for  further  monitoring of  renal function.        Atherosclerosis of native arteries of extremities with intermittent claudication, bilateral legs (HCC)  Patient is stable  and will continue present plan of care and reassess at next routine visit. All questions about this problem from patient were answered today.         Chronic diastolic CHF (congestive heart failure) (HCC)  Wt Readings from Last 3 Encounters:   25 77.6 kg (171 lb)   25 77.1 kg (170 lb)   25 76.7 kg (169 lb)     Patient is stable  and will continue present plan of care and reassess at next routine visit. All questions about this problem from patient were answered today.              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 Pt  recommended to see eye doctor and foot doctor for routine screening as per protocol. Recheck A1C  and Cr in 3 months. Patient questions answered today about this condtion.   Lab Results   Component Value Date    HGBA1C 6.5 (H) 2025     Orders:  •  Hemoglobin A1C; Future    Coronary artery disease involving native coronary artery of native heart without angina pectoris  Patient to continue  with current cardiac meds to decrease risk  of re stenosis. Patient to follow up with cardiology for scheduled appointments to decrease risk for further cardiac problems with appropriate diagnostic testing to reassess cardiac status. Patient had alll questions about this problem answered today.        Gastroesophageal reflux disease without esophagitis  Patient to continue with present therapy and decrease caffeine, avoid ETOH and smoking to decrease acid production. Pt should also cease eating prior to bedtime and avoid excessive fluid intake prior to sleep. May use antacids as needed for breakthrough GERD. All pateint questions answered today about this condition.        Mixed hyperlipidemia  Patient  is stable with current medication and we discussed a low fat low cholesterol diet. Weight loss also discussed for this will help lower cholesterol also. Recheck lipids in 6 months.        Mesenteric artery stenosis/recent mesenteric ischemia  Patient is stable  and will continue present plan of care and reassess at next routine visit. All questions about this problem from patient were answered today.        Primary hypertension  Patient is stable with current anti-hypertensive medicine and continue to follow a low sodium diet and take current medication. All questions about this condition were answered today.        Primary non-small cell carcinoma of lower lobe of left lung (HCC)  Patient is stable  and will continue present plan of care and reassess at next routine visit. All questions about this problem from patient were answered today.        S/P TAVR (transcatheter aortic valve replacement)  Patient is stable  and will continue present plan of care and reassess at next routine visit. All questions about this problem from patient were answered today.        S/P CABG (coronary artery bypass graft)  Patient is stable  and will continue present plan of care and reassess at next routine visit. All questions about this problem from patient were answered today.  "       Panlobular emphysema (HCC)               Falls Plan of Care: balance, strength, and gait training instructions were provided. Home safety education provided.   History of Present Illness   HPI  Review of Systems   Constitutional:  Negative for activity change, appetite change, chills, fatigue, fever and unexpected weight change.   HENT:  Negative for congestion, ear pain, hearing loss, mouth sores, postnasal drip, sinus pressure, sinus pain, sneezing and sore throat.    Respiratory:  Negative for apnea, cough, shortness of breath and wheezing.    Cardiovascular:  Negative for chest pain, palpitations and leg swelling.   Gastrointestinal:  Negative for abdominal pain, constipation, diarrhea, nausea and vomiting.   Endocrine: Negative for cold intolerance and heat intolerance.   Genitourinary:  Negative for dysuria, frequency and hematuria.   Musculoskeletal:  Negative for arthralgias, back pain, gait problem, joint swelling and neck pain.   Skin:  Negative for rash.   Neurological:  Negative for dizziness, weakness and numbness.   Hematological:  Does not bruise/bleed easily.   Psychiatric/Behavioral:  Negative for agitation, behavioral problems, confusion, hallucinations and sleep disturbance. The patient is not nervous/anxious.        Objective   /50 (BP Location: Left arm, Patient Position: Sitting, Cuff Size: Large)   Pulse (!) 47   Temp (!) 97.2 °F (36.2 °C) (Temporal)   Resp 16   Ht 5' 10\" (1.778 m)   Wt 77.6 kg (171 lb)   SpO2 96%   BMI 24.54 kg/m²      Physical Exam    "

## 2025-06-30 NOTE — ASSESSMENT & PLAN NOTE
Patient is stable with current meds and discussed a low carb diet. Pt  recommended to see eye doctor and foot doctor for routine screening as per protocol. Recheck A1C  and Cr in 3 months. Patient questions answered today about this condtion.   Lab Results   Component Value Date    HGBA1C 6.5 (H) 05/01/2025     Orders:  •  Hemoglobin A1C; Future   denies pain/discomfort

## 2025-06-30 NOTE — ASSESSMENT & PLAN NOTE
Patient is stable  and will continue present plan of care and reassess at next routine visit. All questions about this problem from patient were answered today.

## 2025-07-01 RX ORDER — TORSEMIDE 10 MG/1
10 TABLET ORAL DAILY
Qty: 90 TABLET | Refills: 1 | Status: SHIPPED | OUTPATIENT
Start: 2025-07-01

## 2025-07-04 DIAGNOSIS — I50.9 CHF EXACERBATION (HCC): ICD-10-CM

## 2025-07-04 DIAGNOSIS — I50.33 ACUTE ON CHRONIC HEART FAILURE WITH PRESERVED EJECTION FRACTION (HFPEF) (HCC): ICD-10-CM

## 2025-07-04 DIAGNOSIS — N18.4 CKD (CHRONIC KIDNEY DISEASE) STAGE 4, GFR 15-29 ML/MIN (HCC): ICD-10-CM

## 2025-07-06 RX ORDER — POTASSIUM CHLORIDE 1500 MG/1
20 TABLET, EXTENDED RELEASE ORAL DAILY
Qty: 90 TABLET | Refills: 1 | Status: SHIPPED | OUTPATIENT
Start: 2025-07-06

## 2025-07-07 ENCOUNTER — HOSPITAL ENCOUNTER (OUTPATIENT)
Dept: RADIOLOGY | Facility: MEDICAL CENTER | Age: 80
Discharge: HOME | End: 2025-07-07
Payer: MEDICARE

## 2025-07-07 DIAGNOSIS — Z85.118 ENCOUNTER FOR FOLLOW-UP SURVEILLANCE OF LUNG CANCER: ICD-10-CM

## 2025-07-07 DIAGNOSIS — Z08 ENCOUNTER FOR FOLLOW-UP SURVEILLANCE OF LUNG CANCER: ICD-10-CM

## 2025-07-07 PROCEDURE — 71250 CT THORAX DX C-: CPT

## 2025-07-15 ENCOUNTER — OFFICE VISIT (OUTPATIENT)
Dept: CARDIOLOGY CLINIC | Facility: MEDICAL CENTER | Age: 80
End: 2025-07-15
Payer: MEDICARE

## 2025-07-15 ENCOUNTER — TELEPHONE (OUTPATIENT)
Age: 80
End: 2025-07-15

## 2025-07-15 VITALS
HEIGHT: 70 IN | DIASTOLIC BLOOD PRESSURE: 48 MMHG | WEIGHT: 170 LBS | SYSTOLIC BLOOD PRESSURE: 118 MMHG | OXYGEN SATURATION: 97 % | HEART RATE: 54 BPM | BODY MASS INDEX: 24.34 KG/M2

## 2025-07-15 DIAGNOSIS — Z95.1 S/P CABG (CORONARY ARTERY BYPASS GRAFT): Chronic | ICD-10-CM

## 2025-07-15 DIAGNOSIS — I10 PRIMARY HYPERTENSION: ICD-10-CM

## 2025-07-15 DIAGNOSIS — E78.2 MIXED HYPERLIPIDEMIA: ICD-10-CM

## 2025-07-15 DIAGNOSIS — I25.10 CORONARY ARTERY DISEASE INVOLVING NATIVE CORONARY ARTERY OF NATIVE HEART WITHOUT ANGINA PECTORIS: Primary | ICD-10-CM

## 2025-07-15 DIAGNOSIS — Z95.2 S/P TAVR (TRANSCATHETER AORTIC VALVE REPLACEMENT): ICD-10-CM

## 2025-07-15 DIAGNOSIS — I70.1 RENAL ARTERY STENOSIS, NATIVE, BILATERAL (HCC): Chronic | ICD-10-CM

## 2025-07-15 DIAGNOSIS — I12.9 BENIGN HYPERTENSION WITH CHRONIC KIDNEY DISEASE, STAGE III (HCC): ICD-10-CM

## 2025-07-15 DIAGNOSIS — N18.30 BENIGN HYPERTENSION WITH CHRONIC KIDNEY DISEASE, STAGE III (HCC): ICD-10-CM

## 2025-07-15 DIAGNOSIS — I65.23 ASYMPTOMATIC BILATERAL CAROTID ARTERY STENOSIS: Chronic | ICD-10-CM

## 2025-07-15 DIAGNOSIS — K55.9 MESENTERIC ISCHEMIA (HCC): ICD-10-CM

## 2025-07-15 DIAGNOSIS — I50.32 CHRONIC DIASTOLIC CHF (CONGESTIVE HEART FAILURE) (HCC): ICD-10-CM

## 2025-07-15 PROCEDURE — 99214 OFFICE O/P EST MOD 30 MIN: CPT | Performed by: INTERNAL MEDICINE

## 2025-07-28 ENCOUNTER — OFFICE VISIT (OUTPATIENT)
Dept: RADIATION ONCOLOGY | Facility: CLINIC | Age: 80
End: 2025-07-28
Attending: RADIOLOGY
Payer: MEDICARE

## 2025-07-28 VITALS
HEART RATE: 96 BPM | OXYGEN SATURATION: 95 % | DIASTOLIC BLOOD PRESSURE: 70 MMHG | BODY MASS INDEX: 24.39 KG/M2 | SYSTOLIC BLOOD PRESSURE: 128 MMHG | TEMPERATURE: 98.2 F | WEIGHT: 170 LBS

## 2025-07-28 DIAGNOSIS — Z85.118 ENCOUNTER FOR FOLLOW-UP SURVEILLANCE OF LUNG CANCER: Primary | ICD-10-CM

## 2025-07-28 DIAGNOSIS — Z08 ENCOUNTER FOR FOLLOW-UP SURVEILLANCE OF LUNG CANCER: Primary | ICD-10-CM

## 2025-07-28 DIAGNOSIS — I10 ESSENTIAL HYPERTENSION: ICD-10-CM

## 2025-07-28 DIAGNOSIS — R91.8 OTHER NONSPECIFIC ABNORMAL FINDING OF LUNG FIELD: ICD-10-CM

## 2025-07-28 DIAGNOSIS — Z72.0 TOBACCO ABUSE: ICD-10-CM

## 2025-07-28 PROCEDURE — G2211 COMPLEX E/M VISIT ADD ON: HCPCS | Performed by: RADIOLOGY

## 2025-07-28 PROCEDURE — 99213 OFFICE O/P EST LOW 20 MIN: CPT | Performed by: RADIOLOGY

## 2025-07-29 RX ORDER — AMLODIPINE BESYLATE 10 MG/1
10 TABLET ORAL DAILY
Qty: 90 TABLET | Refills: 1 | Status: SHIPPED | OUTPATIENT
Start: 2025-07-29

## 2025-07-30 PROBLEM — Z72.0 TOBACCO ABUSE: Status: ACTIVE | Noted: 2025-07-30

## 2025-08-07 DIAGNOSIS — M10.9 GOUT OF LEFT FOOT, UNSPECIFIED CAUSE, UNSPECIFIED CHRONICITY: ICD-10-CM

## 2025-08-07 RX ORDER — ALLOPURINOL 300 MG/1
300 TABLET ORAL DAILY
Qty: 90 TABLET | Refills: 1 | Status: SHIPPED | OUTPATIENT
Start: 2025-08-07

## 2025-08-21 ENCOUNTER — OFFICE VISIT (OUTPATIENT)
Dept: SURGERY | Facility: CLINIC | Age: 80
End: 2025-08-21
Payer: MEDICARE

## 2025-08-21 VITALS
BODY MASS INDEX: 24.54 KG/M2 | WEIGHT: 171.4 LBS | TEMPERATURE: 97 F | SYSTOLIC BLOOD PRESSURE: 110 MMHG | OXYGEN SATURATION: 95 % | HEIGHT: 70 IN | DIASTOLIC BLOOD PRESSURE: 78 MMHG | HEART RATE: 57 BPM

## 2025-08-21 DIAGNOSIS — C44.729 SQUAMOUS CELL CARCINOMA OF LEFT LOWER LEG: Primary | ICD-10-CM

## 2025-08-21 PROCEDURE — 99212 OFFICE O/P EST SF 10 MIN: CPT | Performed by: SURGERY

## (undated) DEVICE — RADIFOCUS GLIDEWIRE: Brand: GLIDEWIRE

## (undated) DEVICE — BETHLEHEM MAJOR GENERAL PACK: Brand: CARDINAL HEALTH

## (undated) DEVICE — Device

## (undated) DEVICE — SUT MONOCRYL 2-0 CT-1 36 IN Y945H

## (undated) DEVICE — SUT SILK 0 CT-1 30 IN 424H

## (undated) DEVICE — DRESSING MEPILEX AG BORDER POST-OP 4 X 10 IN

## (undated) DEVICE — ANTIBACTERIAL UNDYED BRAIDED (POLYGLACTIN 910), SYNTHETIC ABSORBABLE SUTURE: Brand: COATED VICRYL

## (undated) DEVICE — X-RAY DETECTABLE SPONGES,16 PLY: Brand: VISTEC

## (undated) DEVICE — CHLORAPREP HI-LITE 10.5ML ORANGE

## (undated) DEVICE — PACK PBDS AORTIC ANEURYSM RF

## (undated) DEVICE — SUT VICRYL PLUS 1 CTB-1 36 IN VCPB947H

## (undated) DEVICE — TREK CORONARY DILATATION CATHETER 2.50 MM X 8 MM / RAPID-EXCHANGE: Brand: TREK

## (undated) DEVICE — PINNACLE INTRODUCER SHEATH: Brand: PINNACLE

## (undated) DEVICE — DGW .035 FC J3MM 150CM TEF: Brand: EMERALD

## (undated) DEVICE — WOUND RETRACTOR AND PROTECTOR: Brand: ALEXIS O WOUND PROTECTOR-RETRACTOR

## (undated) DEVICE — MINI TREK CORONARY DILATATION CATHETER 2.0 MM X 8 MM / RAPID-EXCHANGE: Brand: MINI TREK

## (undated) DEVICE — GLOVE INDICATOR PI UNDERGLOVE SZ 8 BLUE

## (undated) DEVICE — PETRI DISH STERILE

## (undated) DEVICE — VESSEL LOOPS X-RAY DETECTABLE: Brand: DEROYAL

## (undated) DEVICE — 3M™ TEGADERM™ TRANSPARENT FILM DRESSING FRAME STYLE, 1626W, 4 IN X 4-3/4 IN (10 CM X 12 CM), 50/CT 4CT/CASE: Brand: 3M™ TEGADERM™

## (undated) DEVICE — SUT MONOCRYL 4-0 PS-2 27 IN Y426H

## (undated) DEVICE — PAD GROUNDING DUAL ADULT

## (undated) DEVICE — GLOVE SRG BIOGEL 7.5

## (undated) DEVICE — GUIDEWIRE WHOLEY HI TORQUE INTERM MOD J .035 145CM

## (undated) DEVICE — PROXIMATE PLUS MD MULTI-DIRECTIONAL RELEASE SKIN STAPLERS CONTAINS 35 STAINLESS STEEL STAPLES APPROXIMATE CLOSED DIMENSIONS: 6.9MM X 3.9MM WIDE: Brand: PROXIMATE

## (undated) DEVICE — CATH STRAIGHT RED RUBBER 20 FR

## (undated) DEVICE — GUIDEWIRE AMPLATZ .035 260CM 6CM ST SS

## (undated) DEVICE — SURGICEL 4 X 8IN

## (undated) DEVICE — GLIDESHEATH SLENDER STAINLESS STEEL KIT: Brand: GLIDESHEATH SLENDER

## (undated) DEVICE — PRESTO™ INFLATION DEVICE: Brand: PRESTO

## (undated) DEVICE — HEAVY DUTY TABLE COVER: Brand: CONVERTORS

## (undated) DEVICE — THE TURNPIKE CATHETERS ARE INTENDED TO BE USED TO ACCESS DISCRETE REGIONS OF THE CORONARY AND/OR PERIPHERAL VASCULATURE. THEY MAY BE USED TO FACILITATE PLACEMENT AND EXCHANGE OF GUIDEWIRES AND TO SUBSELECTIVELY INFUSE/DELIVER DIAGNOSTIC AND THERAPEUTIC AGENTS.: Brand: TURNPIKE® CATHETER

## (undated) DEVICE — LIGACLIP MCA MULTIPLE CLIP APPLIERS, 20 SMALL CLIPS: Brand: LIGACLIP

## (undated) DEVICE — DGW .035 FC J3MM 260CM TEF: Brand: EMERALD

## (undated) DEVICE — GAUZE SPONGES,16 PLY: Brand: CURITY

## (undated) DEVICE — ADHESIVE SKIN HIGH VISCOSITY EXOFIN 1ML

## (undated) DEVICE — GUIDEWIRE VASC .018 150CM 8CM TAP V-18

## (undated) DEVICE — TRAY FOLEY 16FR URIMETER SURESTEP

## (undated) DEVICE — MINI TREK CORONARY DILATATION CATHETER 1.50 MM X 8 MM / RAPID-EXCHANGE: Brand: MINI TREK

## (undated) DEVICE — PINNACLE R/O II INTRODUCER SHEATH WITH RADIOPAQUE MARKER: Brand: PINNACLE

## (undated) DEVICE — SURGIFOAM 8.5 X 12.5

## (undated) DEVICE — ARTHROSCOPY FLOOR MAT

## (undated) DEVICE — IV SET 15 DROP STERILE 0/Y GRAVITY

## (undated) DEVICE — PROBE COVER: Brand: STERILE PROBE COVER

## (undated) DEVICE — NEEDLE 25G X 1 1/2

## (undated) DEVICE — CARDIO PERI-GROIN: Brand: CONVERTORS

## (undated) DEVICE — TOWEL SURG XR DETECT GREEN STRL RFD

## (undated) DEVICE — INTENDED FOR TISSUE SEPARATION, AND OTHER PROCEDURES THAT REQUIRE A SHARP SURGICAL BLADE TO PUNCTURE OR CUT.: Brand: BARD-PARKER ® CARBON RIB-BACK BLADES

## (undated) DEVICE — HEMOSTATIC MATRIX SURGIFLO 8ML W/THROMBIN

## (undated) DEVICE — SUT PROLENE 3-0 SH 36 IN 8522H

## (undated) DEVICE — 2000CC GUARDIAN II: Brand: GUARDIAN

## (undated) DEVICE — SUT SILK 2-0 SH 30 IN K833H

## (undated) DEVICE — 3000CC GUARDIAN II: Brand: GUARDIAN

## (undated) DEVICE — CATH DIAG 6FR IMPULSE 100CM FR4

## (undated) DEVICE — SUT PROLENE 5-0 C-1/C-1 36 IN 8720H

## (undated) DEVICE — STRL COTTON TIP APPLCTR 6IN PK: Brand: CARDINAL HEALTH

## (undated) DEVICE — 4 F TEMPO AQUA 0.038  65CM VER: Brand: TEMPO AQUA

## (undated) DEVICE — CATH DIAG 5FR IMPULSE 110CM 6S PIG 145 ANG

## (undated) DEVICE — INTENDED FOR TISSUE SEPARATION, AND OTHER PROCEDURES THAT REQUIRE A SHARP SURGICAL BLADE TO PUNCTURE OR CUT.: Brand: BARD-PARKER SAFETY BLADES SIZE 11, STERILE

## (undated) DEVICE — GUIDELINER CATHETERS ARE INTENDED TO BE USED IN CONJUNCTION WITH GUIDE CATHETERS TO ACCESS DISCRETE REGIONS OF THE CORONARY AND/OR PERIPHERAL VASCULATURE, AND TO FACILITATE PLACEMENT OF INTERVENTIONAL DEVICES.: Brand: GUIDELINER® V3 CATHETER

## (undated) DEVICE — INTENDED FOR TISSUE SEPARATION, AND OTHER PROCEDURES THAT REQUIRE A SHARP SURGICAL BLADE TO PUNCTURE OR CUT.: Brand: BARD-PARKER SAFETY BLADES SIZE 15, STERILE

## (undated) DEVICE — FLEXCIL HIGH PRESSURE CONTRAST INJECTION LINE: Brand: NAMIC

## (undated) DEVICE — SUT SILK 4-0 30 IN A303H

## (undated) DEVICE — CATH DIAG 5FR IMPULSE 100CM FL4

## (undated) DEVICE — FOGARTY ARTERIAL EMBOLECTOMY CATHETER 3F 80CM: Brand: FOGARTY

## (undated) DEVICE — TRAY FOLEY 16FR SURESTEP TEMP SENS URIMETER STAT LOK

## (undated) DEVICE — CATH DIAG 6FR IMPULSE 110CM PIG 145 ANG

## (undated) DEVICE — CARDIOVASCULAR SPLIT DRAPE: Brand: CONVERTORS

## (undated) DEVICE — HI-TORQUE WHISPER MS GUIDE WIRE .014 STRAIGHT TIP 3.0 CM X 300 CM: Brand: HI-TORQUE WHISPER

## (undated) DEVICE — SWAN-GANZ BIPOLAR PACING CATHETER: Brand: SWAN-GANZ

## (undated) DEVICE — SUT PDS II 1 XLH 96 IN LOOPED Z881G

## (undated) DEVICE — CATH BAL CHARGER 4 X 20MM X 75CM

## (undated) DEVICE — DECANTER: Brand: UNBRANDED

## (undated) DEVICE — CHLORAPREP HI-LITE 26ML ORANGE

## (undated) DEVICE — SUT SILK 2-0 30 IN A305H

## (undated) DEVICE — CATH DIAG 5FR IMPULSE 100CM FR4

## (undated) DEVICE — DRAPE SURGIKIT SADDLE BAG

## (undated) DEVICE — RADIFOCUS TORQUE DEVICE MULTI-TORQUE VISE: Brand: RADIFOCUS TORQUE DEVICE

## (undated) DEVICE — GLOVE RADIATION SZ 7

## (undated) DEVICE — CATH4F INF PIG 155Â° MOD 110CM: Brand: INFINITI

## (undated) DEVICE — STERILE BETHLEHEM FEM POP PACK: Brand: CARDINAL HEALTH

## (undated) DEVICE — PROXIMATE SKIN STAPLERS (35 WIDE) CONTAINS 35 STAINLESS STEEL STAPLES (FIXED HEAD): Brand: PROXIMATE

## (undated) DEVICE — SPONGE LAP 18 X 4 IN

## (undated) DEVICE — SILVER-COATED ANTIBACTERIAL BARRIER DRESSING: Brand: ACTICOAT SURGIC 10X12CM 5PK US

## (undated) DEVICE — DRESSING MEPILEX AG BORDER 3 X 3 IN

## (undated) DEVICE — CATH BAL SAPPHIRE II PRO 1 X 10MM

## (undated) DEVICE — SUT MONOCRYL 3-0 SH 27 IN Y416H

## (undated) DEVICE — INFUSER BAG 500ML

## (undated) DEVICE — GUIDEWIRE AMPLATZ .035 180CM 6CM ST SS

## (undated) DEVICE — SUT SILK 0 30 IN A306H

## (undated) DEVICE — DEFIB ADULT ELECTRODE CARDINAL

## (undated) DEVICE — GLOVE SRG BIOGEL ECLIPSE 7

## (undated) DEVICE — SURGICEL FIBRILLAR 1 X 2

## (undated) DEVICE — MINI TREK CORONARY DILATATION CATHETER 1.50 MM X 12 MM / RAPID-EXCHANGE: Brand: MINI TREK

## (undated) DEVICE — SUT SILK 2-0 18 IN A185H

## (undated) DEVICE — CATH DIAG 5FR .035 65CM 6S OMMI-FLUSH

## (undated) DEVICE — SCD SEQUENTIAL COMPRESSION COMFORT SLEEVE MEDIUM KNEE LENGTH: Brand: KENDALL SCD

## (undated) DEVICE — INTRO SHEATH 6FR .038 30CM CHECK-FLO

## (undated) DEVICE — 3M™ IOBAN™ 2 ANTIMICROBIAL INCISE DRAPE 6650EZ: Brand: IOBAN™ 2

## (undated) DEVICE — PACK CUSTOM CARDIOVASCULAR

## (undated) DEVICE — MEDI-VAC YANK SUCT HNDL W/TPRD BULBOUS TIP: Brand: CARDINAL HEALTH

## (undated) DEVICE — VESSEL CANNULA

## (undated) DEVICE — CATH GUIDE RUBICON 18 X 90

## (undated) DEVICE — FLUID MANAGEMENT KIT - IR

## (undated) DEVICE — GLOVE INDICATOR PI UNDERGLOVE SZ 7 BLUE

## (undated) DEVICE — SUT SILK 2 60 IN SA8H

## (undated) DEVICE — RADIFOCUS GLIDEWIRE ADVANTAGE GUIDEWIRE: Brand: GLIDEWIRE ADVANTAGE

## (undated) DEVICE — SYRINGE KIT,PACKAGED,,150FT,MK 7(ANGIO-ARTERION, 150ML SYR KIT W/QFT,MC)(60729385): Brand: MEDRAD® MARK 7 ARTERION DISPOSABLE SYRINGE 150 ML WITH QUICK FILL TUBE

## (undated) DEVICE — SUT SILK 3-0 30 IN A304H

## (undated) DEVICE — SNAP KOVER: Brand: UNBRANDED

## (undated) DEVICE — MICROPUNCTURE INTRODUCER SET SILHOUETTE TRANSITIONLESS PUSH-PLUS DESIGN - STIFFENED CANNULA WITH NITINOL WIRE GUIDE: Brand: MICROPUNCTURE

## (undated) DEVICE — GLOVE SRG BIOGEL ORTHOPEDIC 7.5

## (undated) DEVICE — THERMOFLECT BLANKET, L, 25EA                               TS THERMOFLECT BLANKET, 48" X 84", SILVER, 5/BG, 5 BG/CS NW: Brand: THERMOFLECT

## (undated) DEVICE — PANNUS RETENTION SYSTEM

## (undated) DEVICE — SUT PROLENE 6-0 C-1/C-1 30 IN 8706H

## (undated) DEVICE — PAD GROUNDING ADULT

## (undated) DEVICE — CATH DIAG 6FR IMPULSE 100CM IM

## (undated) DEVICE — MICROPUNCTURE 501

## (undated) DEVICE — PACK PBDS LAP CHOLE RF

## (undated) DEVICE — GAUZE SPONGES,USP TYPE VII GAUZE, 12 PLY: Brand: CURITY

## (undated) DEVICE — GUIDEWIRE V-14 SHORT TAPER 300 ST

## (undated) DEVICE — INSTRUMENT POUCH: Brand: CONVERTORS

## (undated) DEVICE — NON-DEHP HIGH FLOW RATE EXTENSION SET, MALE LUER LOCK ADAPTER